# Patient Record
Sex: FEMALE | Race: WHITE | NOT HISPANIC OR LATINO
[De-identification: names, ages, dates, MRNs, and addresses within clinical notes are randomized per-mention and may not be internally consistent; named-entity substitution may affect disease eponyms.]

---

## 2017-01-27 ENCOUNTER — RECORD ABSTRACTING (OUTPATIENT)
Age: 67
End: 2017-01-27

## 2017-01-27 ENCOUNTER — OUTPATIENT (OUTPATIENT)
Dept: OUTPATIENT SERVICES | Facility: HOSPITAL | Age: 67
LOS: 1 days | Discharge: HOME | End: 2017-01-27
Payer: MEDICARE

## 2017-01-27 DIAGNOSIS — Z78.9 OTHER SPECIFIED HEALTH STATUS: ICD-10-CM

## 2017-01-27 DIAGNOSIS — R19.7 DIARRHEA, UNSPECIFIED: ICD-10-CM

## 2017-01-27 DIAGNOSIS — Z87.2 PERSONAL HISTORY OF DISEASES OF THE SKIN AND SUBCUTANEOUS TISSUE: ICD-10-CM

## 2017-01-27 PROCEDURE — 93010 ELECTROCARDIOGRAM REPORT: CPT

## 2017-01-31 ENCOUNTER — RESULT REVIEW (OUTPATIENT)
Age: 67
End: 2017-01-31

## 2017-02-01 ENCOUNTER — OUTPATIENT (OUTPATIENT)
Dept: OUTPATIENT SERVICES | Facility: HOSPITAL | Age: 67
LOS: 1 days | Discharge: HOME | End: 2017-02-01
Payer: MEDICARE

## 2017-02-01 VITALS
DIASTOLIC BLOOD PRESSURE: 82 MMHG | TEMPERATURE: 98 F | SYSTOLIC BLOOD PRESSURE: 145 MMHG | OXYGEN SATURATION: 97 % | HEART RATE: 106 BPM | RESPIRATION RATE: 20 BRPM | WEIGHT: 293 LBS | HEIGHT: 70 IN

## 2017-02-01 DIAGNOSIS — Z98.890 OTHER SPECIFIED POSTPROCEDURAL STATES: Chronic | ICD-10-CM

## 2017-02-01 DIAGNOSIS — K95.09 OTHER COMPLICATIONS OF GASTRIC BAND PROCEDURE: Chronic | ICD-10-CM

## 2017-02-01 PROCEDURE — 88305 TISSUE EXAM BY PATHOLOGIST: CPT | Mod: 26

## 2017-02-01 NOTE — ASU PATIENT PROFILE, ADULT - PMH
Atrial fibrillation    Congestive heart failure    Diabetes mellitus type II, controlled    Dyspnea    Morbid obesity with BMI of 40.0-44.9, adult    Neuropathy    Peripheral venous insufficiency    Thyroid nodule

## 2017-02-01 NOTE — ASU PATIENT PROFILE, ADULT - PSH
H/O colonoscopy    History of esophagogastroduodenoscopy (EGD)    Other complications of gastric band procedure    S/P left knee arthroscopy    Status post medial meniscus repair

## 2017-02-02 LAB — SURGICAL PATHOLOGY FINAL REPORT - CH: SIGNIFICANT CHANGE UP

## 2017-02-08 DIAGNOSIS — K57.30 DIVERTICULOSIS OF LARGE INTESTINE WITHOUT PERFORATION OR ABSCESS WITHOUT BLEEDING: ICD-10-CM

## 2017-02-08 DIAGNOSIS — Z79.82 LONG TERM (CURRENT) USE OF ASPIRIN: ICD-10-CM

## 2017-02-08 DIAGNOSIS — Z79.01 LONG TERM (CURRENT) USE OF ANTICOAGULANTS: ICD-10-CM

## 2017-02-08 DIAGNOSIS — G62.9 POLYNEUROPATHY, UNSPECIFIED: ICD-10-CM

## 2017-02-08 DIAGNOSIS — E66.01 MORBID (SEVERE) OBESITY DUE TO EXCESS CALORIES: ICD-10-CM

## 2017-02-08 DIAGNOSIS — I48.91 UNSPECIFIED ATRIAL FIBRILLATION: ICD-10-CM

## 2017-02-08 DIAGNOSIS — I73.9 PERIPHERAL VASCULAR DISEASE, UNSPECIFIED: ICD-10-CM

## 2017-02-08 DIAGNOSIS — K64.8 OTHER HEMORRHOIDS: ICD-10-CM

## 2017-02-08 DIAGNOSIS — E11.9 TYPE 2 DIABETES MELLITUS WITHOUT COMPLICATIONS: ICD-10-CM

## 2017-02-08 DIAGNOSIS — R19.7 DIARRHEA, UNSPECIFIED: ICD-10-CM

## 2017-02-08 DIAGNOSIS — I50.9 HEART FAILURE, UNSPECIFIED: ICD-10-CM

## 2017-02-08 DIAGNOSIS — Z88.0 ALLERGY STATUS TO PENICILLIN: ICD-10-CM

## 2017-02-08 DIAGNOSIS — E78.00 PURE HYPERCHOLESTEROLEMIA, UNSPECIFIED: ICD-10-CM

## 2017-02-08 DIAGNOSIS — Z87.891 PERSONAL HISTORY OF NICOTINE DEPENDENCE: ICD-10-CM

## 2017-02-14 ENCOUNTER — RECORD ABSTRACTING (OUTPATIENT)
Age: 67
End: 2017-02-14

## 2017-02-22 ENCOUNTER — APPOINTMENT (OUTPATIENT)
Dept: DERMATOLOGY | Facility: CLINIC | Age: 67
End: 2017-02-22

## 2017-03-21 ENCOUNTER — RX RENEWAL (OUTPATIENT)
Age: 67
End: 2017-03-21

## 2017-07-24 ENCOUNTER — RX RENEWAL (OUTPATIENT)
Age: 67
End: 2017-07-24

## 2017-10-23 ENCOUNTER — EMERGENCY (EMERGENCY)
Facility: HOSPITAL | Age: 67
LOS: 0 days | Discharge: HOME | End: 2017-10-23

## 2017-10-23 DIAGNOSIS — M54.6 PAIN IN THORACIC SPINE: ICD-10-CM

## 2017-10-23 DIAGNOSIS — Z79.899 OTHER LONG TERM (CURRENT) DRUG THERAPY: ICD-10-CM

## 2017-10-23 DIAGNOSIS — M54.2 CERVICALGIA: ICD-10-CM

## 2017-10-23 DIAGNOSIS — Z98.890 OTHER SPECIFIED POSTPROCEDURAL STATES: Chronic | ICD-10-CM
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
## 2017-10-23 DIAGNOSIS — K95.09 OTHER COMPLICATIONS OF GASTRIC BAND PROCEDURE: Chronic | ICD-10-CM

## 2017-10-23 DIAGNOSIS — Z79.01 LONG TERM (CURRENT) USE OF ANTICOAGULANTS: ICD-10-CM

## 2017-10-23 DIAGNOSIS — E11.9 TYPE 2 DIABETES MELLITUS WITHOUT COMPLICATIONS: ICD-10-CM

## 2017-10-23 DIAGNOSIS — Z88.0 ALLERGY STATUS TO PENICILLIN: ICD-10-CM

## 2017-10-23 DIAGNOSIS — I10 ESSENTIAL (PRIMARY) HYPERTENSION: ICD-10-CM
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
## 2017-10-30 ENCOUNTER — RX RENEWAL (OUTPATIENT)
Age: 67
End: 2017-10-30

## 2017-11-16 ENCOUNTER — APPOINTMENT (OUTPATIENT)
Dept: DERMATOLOGY | Facility: CLINIC | Age: 67
End: 2017-11-16
Payer: MEDICARE

## 2017-11-16 VITALS — HEIGHT: 70 IN | BODY MASS INDEX: 40.09 KG/M2 | WEIGHT: 280 LBS

## 2017-11-16 VITALS — BODY MASS INDEX: 40.09 KG/M2 | WEIGHT: 280 LBS | HEIGHT: 70 IN

## 2017-11-16 VITALS — HEIGHT: 70 IN

## 2017-11-16 DIAGNOSIS — Z86.39 PERSONAL HISTORY OF OTHER ENDOCRINE, NUTRITIONAL AND METABOLIC DISEASE: ICD-10-CM

## 2017-11-16 DIAGNOSIS — Z86.79 PERSONAL HISTORY OF OTHER DISEASES OF THE CIRCULATORY SYSTEM: ICD-10-CM

## 2017-11-16 DIAGNOSIS — L85.3 XEROSIS CUTIS: ICD-10-CM

## 2017-11-16 PROCEDURE — 99213 OFFICE O/P EST LOW 20 MIN: CPT

## 2017-11-16 RX ORDER — NITROFURANTOIN (MONOHYDRATE/MACROCRYSTALS) 25; 75 MG/1; MG/1
100 CAPSULE ORAL
Qty: 20 | Refills: 0 | Status: DISCONTINUED | COMMUNITY
Start: 2017-10-06

## 2017-11-16 RX ORDER — BLOOD SUGAR DIAGNOSTIC
STRIP MISCELLANEOUS
Qty: 200 | Refills: 0 | Status: DISCONTINUED | COMMUNITY
Start: 2017-10-27

## 2017-11-16 RX ORDER — METHYLPREDNISOLONE 4 MG/1
4 TABLET ORAL
Qty: 21 | Refills: 0 | Status: DISCONTINUED | COMMUNITY
Start: 2017-10-30

## 2017-11-16 RX ORDER — GLIMEPIRIDE 2 MG/1
2 TABLET ORAL
Refills: 0 | Status: DISCONTINUED | COMMUNITY
End: 2017-11-16

## 2017-11-16 RX ORDER — METOPROLOL TARTRATE 100 MG/1
100 TABLET, FILM COATED ORAL
Refills: 0 | Status: DISCONTINUED | COMMUNITY
End: 2017-11-16

## 2017-11-16 RX ORDER — CIPROFLOXACIN HYDROCHLORIDE 500 MG/1
500 TABLET, FILM COATED ORAL
Qty: 20 | Refills: 0 | Status: DISCONTINUED | COMMUNITY
Start: 2017-10-19

## 2017-11-16 RX ORDER — METOPROLOL SUCCINATE 100 MG/1
100 TABLET, EXTENDED RELEASE ORAL
Qty: 90 | Refills: 0 | Status: DISCONTINUED | COMMUNITY
Start: 2017-04-03

## 2017-11-16 RX ORDER — TIZANIDINE 2 MG/1
2 TABLET ORAL
Qty: 12 | Refills: 0 | Status: DISCONTINUED | COMMUNITY
Start: 2017-10-23

## 2017-11-16 RX ORDER — TRIAMCINOLONE ACETONIDE 1 MG/G
0.1 CREAM TOPICAL
Refills: 0 | Status: DISCONTINUED | COMMUNITY
End: 2017-11-16

## 2017-11-16 RX ORDER — ATORVASTATIN CALCIUM 10 MG/1
10 TABLET, FILM COATED ORAL
Refills: 0 | Status: DISCONTINUED | COMMUNITY
End: 2017-11-16

## 2017-11-16 RX ORDER — OXYCODONE AND ACETAMINOPHEN 5; 325 MG/1; MG/1
5-325 TABLET ORAL
Qty: 5 | Refills: 0 | Status: DISCONTINUED | COMMUNITY
Start: 2017-10-23

## 2017-11-16 RX ORDER — DIOSMIN COMPLEX NO.1 630 MG
TABLET ORAL
Refills: 0 | Status: DISCONTINUED | COMMUNITY
End: 2017-11-16

## 2017-11-16 RX ORDER — CHOLESTYRAMINE 4 G/9G
4 POWDER, FOR SUSPENSION ORAL
Qty: 30 | Refills: 0 | Status: DISCONTINUED | COMMUNITY
Start: 2017-08-17

## 2018-01-05 ENCOUNTER — INPATIENT (INPATIENT)
Facility: HOSPITAL | Age: 68
LOS: 3 days | Discharge: HOME | End: 2018-01-09
Attending: INTERNAL MEDICINE
Payer: COMMERCIAL

## 2018-01-05 VITALS — WEIGHT: 289.91 LBS | HEIGHT: 66 IN

## 2018-01-05 DIAGNOSIS — Z98.890 OTHER SPECIFIED POSTPROCEDURAL STATES: Chronic | ICD-10-CM

## 2018-01-05 DIAGNOSIS — J44.0 CHRONIC OBSTRUCTIVE PULMONARY DISEASE WITH (ACUTE) LOWER RESPIRATORY INFECTION: ICD-10-CM

## 2018-01-05 DIAGNOSIS — J18.9 PNEUMONIA, UNSPECIFIED ORGANISM: ICD-10-CM

## 2018-01-05 DIAGNOSIS — E66.01 MORBID (SEVERE) OBESITY DUE TO EXCESS CALORIES: ICD-10-CM

## 2018-01-05 DIAGNOSIS — K95.09 OTHER COMPLICATIONS OF GASTRIC BAND PROCEDURE: Chronic | ICD-10-CM

## 2018-01-05 LAB
ALBUMIN SERPL ELPH-MCNC: 3 G/DL — LOW (ref 3.3–5)
ALP SERPL-CCNC: 91 U/L — SIGNIFICANT CHANGE UP (ref 40–120)
ALT FLD-CCNC: 19 U/L — SIGNIFICANT CHANGE UP (ref 12–78)
ANION GAP SERPL CALC-SCNC: 7 MMOL/L — SIGNIFICANT CHANGE UP (ref 5–17)
ANISOCYTOSIS BLD QL: SIGNIFICANT CHANGE UP
APPEARANCE UR: CLEAR — SIGNIFICANT CHANGE UP
APTT BLD: 35.4 SEC — SIGNIFICANT CHANGE UP (ref 27.5–37.4)
AST SERPL-CCNC: 13 U/L — LOW (ref 15–37)
BASOPHILS # BLD AUTO: 0.2 K/UL — SIGNIFICANT CHANGE UP (ref 0–0.2)
BASOPHILS NFR BLD AUTO: 1.1 % — SIGNIFICANT CHANGE UP (ref 0–2)
BILIRUB SERPL-MCNC: 0.4 MG/DL — SIGNIFICANT CHANGE UP (ref 0.2–1.2)
BILIRUB UR-MCNC: NEGATIVE — SIGNIFICANT CHANGE UP
BUN SERPL-MCNC: 7 MG/DL — SIGNIFICANT CHANGE UP (ref 7–23)
CALCIUM SERPL-MCNC: 8.4 MG/DL — LOW (ref 8.5–10.1)
CHLORIDE SERPL-SCNC: 107 MMOL/L — SIGNIFICANT CHANGE UP (ref 96–108)
CO2 SERPL-SCNC: 26 MMOL/L — SIGNIFICANT CHANGE UP (ref 22–31)
COLOR SPEC: YELLOW — SIGNIFICANT CHANGE UP
CREAT SERPL-MCNC: 0.44 MG/DL — LOW (ref 0.5–1.3)
D DIMER BLD IA.RAPID-MCNC: <150 NG/ML DDU — SIGNIFICANT CHANGE UP
DIFF PNL FLD: NEGATIVE — SIGNIFICANT CHANGE UP
EOSINOPHIL # BLD AUTO: 0.2 K/UL — SIGNIFICANT CHANGE UP (ref 0–0.5)
EOSINOPHIL NFR BLD AUTO: 1.3 % — SIGNIFICANT CHANGE UP (ref 0–6)
GLUCOSE SERPL-MCNC: 91 MG/DL — SIGNIFICANT CHANGE UP (ref 70–99)
GLUCOSE UR QL: NEGATIVE MG/DL — SIGNIFICANT CHANGE UP
HCT VFR BLD CALC: 28.8 % — LOW (ref 34.5–45)
HGB BLD-MCNC: 8.4 G/DL — LOW (ref 11.5–15.5)
INR BLD: 1.94 RATIO — HIGH (ref 0.88–1.16)
KETONES UR-MCNC: NEGATIVE — SIGNIFICANT CHANGE UP
LEUKOCYTE ESTERASE UR-ACNC: NEGATIVE — SIGNIFICANT CHANGE UP
LYMPHOCYTES # BLD AUTO: 1.9 K/UL — SIGNIFICANT CHANGE UP (ref 1–3.3)
LYMPHOCYTES # BLD AUTO: 12.9 % — LOW (ref 13–44)
MCHC RBC-ENTMCNC: 20.7 PG — LOW (ref 27–34)
MCHC RBC-ENTMCNC: 29 GM/DL — LOW (ref 32–36)
MCV RBC AUTO: 71.3 FL — LOW (ref 80–100)
MICROCYTES BLD QL: SLIGHT — SIGNIFICANT CHANGE UP
MONOCYTES # BLD AUTO: 1.1 K/UL — HIGH (ref 0–0.9)
MONOCYTES NFR BLD AUTO: 7.4 % — SIGNIFICANT CHANGE UP (ref 2–14)
NEUTROPHILS # BLD AUTO: 11.4 K/UL — HIGH (ref 1.8–7.4)
NEUTROPHILS NFR BLD AUTO: 77.7 % — HIGH (ref 43–77)
NITRITE UR-MCNC: NEGATIVE — SIGNIFICANT CHANGE UP
NT-PROBNP SERPL-SCNC: 1130 PG/ML — HIGH (ref 0–125)
OVALOCYTES BLD QL SMEAR: SLIGHT — SIGNIFICANT CHANGE UP
PH UR: 7 — SIGNIFICANT CHANGE UP (ref 5–8)
PLAT MORPH BLD: (no result)
PLATELET # BLD AUTO: 458 K/UL — HIGH (ref 150–400)
POIKILOCYTOSIS BLD QL AUTO: SLIGHT — SIGNIFICANT CHANGE UP
POLYCHROMASIA BLD QL SMEAR: SLIGHT — SIGNIFICANT CHANGE UP
POTASSIUM SERPL-MCNC: 3.7 MMOL/L — SIGNIFICANT CHANGE UP (ref 3.5–5.3)
POTASSIUM SERPL-SCNC: 3.7 MMOL/L — SIGNIFICANT CHANGE UP (ref 3.5–5.3)
PROT SERPL-MCNC: 7.2 GM/DL — SIGNIFICANT CHANGE UP (ref 6–8.3)
PROT UR-MCNC: NEGATIVE MG/DL — SIGNIFICANT CHANGE UP
PROTHROM AB SERPL-ACNC: 21.2 SEC — HIGH (ref 9.8–12.7)
RAPID RVP RESULT: SIGNIFICANT CHANGE UP
RBC # BLD: 4.05 M/UL — SIGNIFICANT CHANGE UP (ref 3.8–5.2)
RBC # FLD: 16.6 % — HIGH (ref 10.3–14.5)
RBC BLD AUTO: (no result)
SODIUM SERPL-SCNC: 140 MMOL/L — SIGNIFICANT CHANGE UP (ref 135–145)
SP GR SPEC: 1 — LOW (ref 1.01–1.02)
TROPONIN I SERPL-MCNC: <0.015 NG/ML — SIGNIFICANT CHANGE UP (ref 0.01–0.04)
UROBILINOGEN FLD QL: NEGATIVE MG/DL — SIGNIFICANT CHANGE UP
WBC # BLD: 15 K/UL — HIGH (ref 3.8–10.5)
WBC # FLD AUTO: 15 K/UL — HIGH (ref 3.8–10.5)

## 2018-01-05 PROCEDURE — 99285 EMERGENCY DEPT VISIT HI MDM: CPT

## 2018-01-05 PROCEDURE — 71045 X-RAY EXAM CHEST 1 VIEW: CPT | Mod: 26

## 2018-01-05 PROCEDURE — 93010 ELECTROCARDIOGRAM REPORT: CPT

## 2018-01-05 RX ORDER — ASPIRIN/CALCIUM CARB/MAGNESIUM 324 MG
162 TABLET ORAL ONCE
Qty: 0 | Refills: 0 | Status: COMPLETED | OUTPATIENT
Start: 2018-01-05 | End: 2018-01-05

## 2018-01-05 RX ORDER — CEFTRIAXONE 500 MG/1
1000 INJECTION, POWDER, FOR SOLUTION INTRAMUSCULAR; INTRAVENOUS ONCE
Qty: 0 | Refills: 0 | Status: COMPLETED | OUTPATIENT
Start: 2018-01-05 | End: 2018-01-05

## 2018-01-05 RX ORDER — AZITHROMYCIN 500 MG/1
500 TABLET, FILM COATED ORAL ONCE
Qty: 0 | Refills: 0 | Status: COMPLETED | OUTPATIENT
Start: 2018-01-05 | End: 2018-01-05

## 2018-01-05 RX ORDER — CEFTRIAXONE 500 MG/1
1 INJECTION, POWDER, FOR SOLUTION INTRAMUSCULAR; INTRAVENOUS ONCE
Qty: 0 | Refills: 0 | Status: DISCONTINUED | OUTPATIENT
Start: 2018-01-05 | End: 2018-01-05

## 2018-01-05 RX ADMIN — Medication 162 MILLIGRAM(S): at 23:46

## 2018-01-05 RX ADMIN — AZITHROMYCIN 255 MILLIGRAM(S): 500 TABLET, FILM COATED ORAL at 23:12

## 2018-01-05 RX ADMIN — CEFTRIAXONE 1000 MILLIGRAM(S): 500 INJECTION, POWDER, FOR SOLUTION INTRAMUSCULAR; INTRAVENOUS at 22:30

## 2018-01-05 NOTE — ED PROVIDER NOTE - PMH
Atrial fibrillation    Congestive heart failure    Diabetes mellitus type II, controlled    Dyspnea    HTN (hypertension)    Morbid obesity with BMI of 40.0-44.9, adult    Neuropathy    Peripheral venous insufficiency    Thyroid nodule

## 2018-01-05 NOTE — ED PROVIDER NOTE - CONSTITUTIONAL, MLM
normal... Obese white female, no respiratory dscomfot, no sentence shortening nor distress, ill appearing though non toxic awake, alert, oriented to person, place, time/situation and in no apparent distress.

## 2018-01-05 NOTE — ED PROVIDER NOTE - DIAGNOSIS COUNSELING, MDM
conducted a detailed discussion... I had a detailed discussion with the patient and family regarding the historical points, exam findings, and any diagnostic results supporting the admit diagnosis.

## 2018-01-05 NOTE — ED PROVIDER NOTE - ENMT, MLM
Airway patent, Nasal mucosa clear. Mouth with moderately dry mucosa. Oropharynx clear. Throat has no vesicles, no oropharyngeal exudates and uvula is midline.

## 2018-01-05 NOTE — ED PROVIDER NOTE - MUSCULOSKELETAL, MLM
Spine appears normal, range of motion is not limited, no muscle or joint tenderness. 2+ pitting edema B/L pretibial. MAEx4. No tenderness.

## 2018-01-05 NOTE — ED PROVIDER NOTE - CARDIAC, MLM
Normal rate, irregularly irregular rhythm.  Heart sounds S1, S2.  No murmurs, rubs or gallops. Normal radial pulse.

## 2018-01-05 NOTE — ED PROVIDER NOTE - CARE PLAN
Principal Discharge DX:	CAP (community acquired pneumonia)  Secondary Diagnosis:	Acute on chronic congestive heart failure, unspecified congestive heart failure type

## 2018-01-05 NOTE — ED PROVIDER NOTE - OBJECTIVE STATEMENT
66 y/o female with PMHx of DM2, CHF, HTN, AFib (on Coumadin) presents to the ED c/o dyspnea x 2 weeks, gradually worsening. Sent in for evaluation by Dr. Mayer. Visited his office today. +mild productive cough, green yellow sputum. No fever. +chest discomfort only with coughing. +chronic LE edema, no change from baseline. No weight gain. +JETT after taking 5 steps. +orthopnea. Pt also c/o loose watery diarrhea, multiple episodes a day. +associated abd pain. No recent abx. States her PMD advised her that she is anemic, dehydrated. PMD Dr. Mayer

## 2018-01-05 NOTE — ED PROVIDER NOTE - MEDICAL DECISION MAKING DETAILS
68 y/o female hx of HTN, DM, chronic AFib on Coumadin 1 week of worsening SOB, JETT, orthopnea. Lungs are clear. Plan: CXR, EKG, monitor, labs including serial Laureen, BNP, ddimer, pulse ox, observe reassess.

## 2018-01-06 DIAGNOSIS — I48.91 UNSPECIFIED ATRIAL FIBRILLATION: ICD-10-CM

## 2018-01-06 DIAGNOSIS — E11.8 TYPE 2 DIABETES MELLITUS WITH UNSPECIFIED COMPLICATIONS: ICD-10-CM

## 2018-01-06 DIAGNOSIS — I50.9 HEART FAILURE, UNSPECIFIED: ICD-10-CM

## 2018-01-06 DIAGNOSIS — D50.9 IRON DEFICIENCY ANEMIA, UNSPECIFIED: ICD-10-CM

## 2018-01-06 DIAGNOSIS — I10 ESSENTIAL (PRIMARY) HYPERTENSION: ICD-10-CM

## 2018-01-06 DIAGNOSIS — I87.2 VENOUS INSUFFICIENCY (CHRONIC) (PERIPHERAL): ICD-10-CM

## 2018-01-06 DIAGNOSIS — J18.9 PNEUMONIA, UNSPECIFIED ORGANISM: ICD-10-CM

## 2018-01-06 DIAGNOSIS — K52.9 NONINFECTIVE GASTROENTERITIS AND COLITIS, UNSPECIFIED: ICD-10-CM

## 2018-01-06 LAB
ANION GAP SERPL CALC-SCNC: 9 MMOL/L — SIGNIFICANT CHANGE UP (ref 5–17)
BUN SERPL-MCNC: 7 MG/DL — SIGNIFICANT CHANGE UP (ref 7–23)
CALCIUM SERPL-MCNC: 8.5 MG/DL — SIGNIFICANT CHANGE UP (ref 8.5–10.1)
CHLORIDE SERPL-SCNC: 106 MMOL/L — SIGNIFICANT CHANGE UP (ref 96–108)
CO2 SERPL-SCNC: 26 MMOL/L — SIGNIFICANT CHANGE UP (ref 22–31)
CREAT SERPL-MCNC: 0.47 MG/DL — LOW (ref 0.5–1.3)
FERRITIN SERPL-MCNC: 15 NG/ML — SIGNIFICANT CHANGE UP (ref 15–150)
GLUCOSE BLDC GLUCOMTR-MCNC: 130 MG/DL — HIGH (ref 70–99)
GLUCOSE BLDC GLUCOMTR-MCNC: 142 MG/DL — HIGH (ref 70–99)
GLUCOSE BLDC GLUCOMTR-MCNC: 145 MG/DL — HIGH (ref 70–99)
GLUCOSE SERPL-MCNC: 132 MG/DL — HIGH (ref 70–99)
HCT VFR BLD CALC: 27.5 % — LOW (ref 34.5–45)
HGB BLD-MCNC: 8.1 G/DL — LOW (ref 11.5–15.5)
INR BLD: 1.87 RATIO — HIGH (ref 0.88–1.16)
IRON SATN MFR SERPL: 20 UG/DL — LOW (ref 30–160)
IRON SATN MFR SERPL: 6 % — LOW (ref 14–50)
MCHC RBC-ENTMCNC: 21.3 PG — LOW (ref 27–34)
MCHC RBC-ENTMCNC: 29.3 GM/DL — LOW (ref 32–36)
MCV RBC AUTO: 72.5 FL — LOW (ref 80–100)
PLATELET # BLD AUTO: 483 K/UL — HIGH (ref 150–400)
POTASSIUM SERPL-MCNC: 3.6 MMOL/L — SIGNIFICANT CHANGE UP (ref 3.5–5.3)
POTASSIUM SERPL-SCNC: 3.6 MMOL/L — SIGNIFICANT CHANGE UP (ref 3.5–5.3)
PROTHROM AB SERPL-ACNC: 20.5 SEC — HIGH (ref 9.8–12.7)
RBC # BLD: 3.79 M/UL — LOW (ref 3.8–5.2)
RBC # FLD: 16.7 % — HIGH (ref 10.3–14.5)
SODIUM SERPL-SCNC: 141 MMOL/L — SIGNIFICANT CHANGE UP (ref 135–145)
TIBC SERPL-MCNC: 326 UG/DL — SIGNIFICANT CHANGE UP (ref 220–430)
TROPONIN I SERPL-MCNC: <0.015 NG/ML — SIGNIFICANT CHANGE UP (ref 0.01–0.04)
TROPONIN I SERPL-MCNC: <0.015 NG/ML — SIGNIFICANT CHANGE UP (ref 0.01–0.04)
UIBC SERPL-MCNC: 306 UG/DL — SIGNIFICANT CHANGE UP (ref 110–370)
WBC # BLD: 11.8 K/UL — HIGH (ref 3.8–10.5)
WBC # FLD AUTO: 11.8 K/UL — HIGH (ref 3.8–10.5)

## 2018-01-06 PROCEDURE — 71250 CT THORAX DX C-: CPT | Mod: 26

## 2018-01-06 RX ORDER — DEXTROSE 50 % IN WATER 50 %
25 SYRINGE (ML) INTRAVENOUS ONCE
Qty: 0 | Refills: 0 | Status: DISCONTINUED | OUTPATIENT
Start: 2018-01-06 | End: 2018-01-09

## 2018-01-06 RX ORDER — LACTOBACILLUS ACIDOPHILUS 100MM CELL
1 CAPSULE ORAL DAILY
Qty: 0 | Refills: 0 | Status: DISCONTINUED | OUTPATIENT
Start: 2018-01-06 | End: 2018-01-09

## 2018-01-06 RX ORDER — POTASSIUM CHLORIDE 20 MEQ
40 PACKET (EA) ORAL ONCE
Qty: 0 | Refills: 0 | Status: COMPLETED | OUTPATIENT
Start: 2018-01-06 | End: 2018-01-06

## 2018-01-06 RX ORDER — ENOXAPARIN SODIUM 100 MG/ML
130 INJECTION SUBCUTANEOUS
Qty: 0 | Refills: 0 | Status: DISCONTINUED | OUTPATIENT
Start: 2018-01-06 | End: 2018-01-07

## 2018-01-06 RX ORDER — SODIUM CHLORIDE 9 MG/ML
1000 INJECTION, SOLUTION INTRAVENOUS
Qty: 0 | Refills: 0 | Status: DISCONTINUED | OUTPATIENT
Start: 2018-01-06 | End: 2018-01-09

## 2018-01-06 RX ORDER — GLUCAGON INJECTION, SOLUTION 0.5 MG/.1ML
1 INJECTION, SOLUTION SUBCUTANEOUS ONCE
Qty: 0 | Refills: 0 | Status: DISCONTINUED | OUTPATIENT
Start: 2018-01-06 | End: 2018-01-09

## 2018-01-06 RX ORDER — FUROSEMIDE 40 MG
40 TABLET ORAL EVERY 8 HOURS
Qty: 0 | Refills: 0 | Status: DISCONTINUED | OUTPATIENT
Start: 2018-01-06 | End: 2018-01-06

## 2018-01-06 RX ORDER — DEXTROSE 50 % IN WATER 50 %
1 SYRINGE (ML) INTRAVENOUS ONCE
Qty: 0 | Refills: 0 | Status: DISCONTINUED | OUTPATIENT
Start: 2018-01-06 | End: 2018-01-09

## 2018-01-06 RX ORDER — POTASSIUM CHLORIDE 20 MEQ
20 PACKET (EA) ORAL
Qty: 0 | Refills: 0 | Status: DISCONTINUED | OUTPATIENT
Start: 2018-01-06 | End: 2018-01-09

## 2018-01-06 RX ORDER — INSULIN LISPRO 100/ML
VIAL (ML) SUBCUTANEOUS
Qty: 0 | Refills: 0 | Status: DISCONTINUED | OUTPATIENT
Start: 2018-01-06 | End: 2018-01-09

## 2018-01-06 RX ORDER — METOPROLOL TARTRATE 50 MG
100 TABLET ORAL DAILY
Qty: 0 | Refills: 0 | Status: DISCONTINUED | OUTPATIENT
Start: 2018-01-06 | End: 2018-01-09

## 2018-01-06 RX ORDER — INSULIN LISPRO 100/ML
VIAL (ML) SUBCUTANEOUS AT BEDTIME
Qty: 0 | Refills: 0 | Status: DISCONTINUED | OUTPATIENT
Start: 2018-01-06 | End: 2018-01-09

## 2018-01-06 RX ORDER — DEXTROSE 50 % IN WATER 50 %
12.5 SYRINGE (ML) INTRAVENOUS ONCE
Qty: 0 | Refills: 0 | Status: DISCONTINUED | OUTPATIENT
Start: 2018-01-06 | End: 2018-01-09

## 2018-01-06 RX ORDER — FUROSEMIDE 40 MG
40 TABLET ORAL EVERY 12 HOURS
Qty: 0 | Refills: 0 | Status: DISCONTINUED | OUTPATIENT
Start: 2018-01-06 | End: 2018-01-09

## 2018-01-06 RX ORDER — ASPIRIN/CALCIUM CARB/MAGNESIUM 324 MG
81 TABLET ORAL DAILY
Qty: 0 | Refills: 0 | Status: DISCONTINUED | OUTPATIENT
Start: 2018-01-06 | End: 2018-01-09

## 2018-01-06 RX ADMIN — Medication 100 MILLIGRAM(S): at 13:09

## 2018-01-06 RX ADMIN — Medication 1 TABLET(S): at 13:09

## 2018-01-06 RX ADMIN — Medication 40 MILLIGRAM(S): at 18:14

## 2018-01-06 RX ADMIN — Medication 40 MILLIEQUIVALENT(S): at 13:08

## 2018-01-06 RX ADMIN — Medication 20 MILLIEQUIVALENT(S): at 18:14

## 2018-01-06 RX ADMIN — Medication 81 MILLIGRAM(S): at 13:30

## 2018-01-06 RX ADMIN — ENOXAPARIN SODIUM 130 MILLIGRAM(S): 100 INJECTION SUBCUTANEOUS at 18:15

## 2018-01-06 NOTE — H&P ADULT - PROBLEM SELECTOR PLAN 5
Hold Metformin. Will do stool for CDifficile as with leukocytosis and worsening diarrhea. O & P and stool culture. GI to see. Replace Potassium and check Magnesium with PVCs.

## 2018-01-06 NOTE — H&P ADULT - ATTENDING COMMENTS
Diet:    RADIOLOGY & ADDITIONAL TESTS:    Imaging Personally Reviewed:  [ ] YES  [ ] NO    Consultant(s) Notes Reviewed:  [ ] YES  [ ] NO      DVT Prophylaxis:  Subcu Heparin [  ]     LMWH [ ]     Coumadin [ ]    Xaeralto [ ]    Eliquis [ ]   Venodyne pumps [ ]    Discussed with Patient [ ]     Family [ ]          [ ]   RN[ ]      [ ]    Advance Directives:    Care Discussed with Consultants/Other Providers [ ] YES  [ ] NO Diet: 1800 ADA low salt and cholesterol    RADIOLOGY & ADDITIONAL TESTS:    Imaging Personally Reviewed:  [ x] YES  [ ] NO    Consultant(s) Notes Reviewed:  [ ] YES  [ ] NO      DVT Prophylaxis:  Subcu Heparin [  ]     LMWH [ x]     Coumadin [ ]    Xaeralto [ ]    Eliquis [ ]   Venodyne pumps [ ]    Discussed with Patient [x ]     Family [ ]          [ ]   RN[x ]      [ ]    Advance Directives: Full code.     Care Discussed with Consultants/Other Providers [x ] YES  [ ] NO  cardiology, RN and ED physician and pharmacist.

## 2018-01-06 NOTE — ED ADULT NURSE REASSESSMENT NOTE - NS ED NURSE REASSESS COMMENT FT1
Patient received from Marian Cantrell RN. Patient resting comfortably in bed. Safety and comfort maintained. Will continue to [facility].

## 2018-01-06 NOTE — H&P ADULT - PROBLEM SELECTOR PLAN 3
Order Toprol and Cardizem and monitor. Lovenox instead of Coumadin due to GI issues and anemia. Patient and cardiology agreed.

## 2018-01-06 NOTE — ED ADULT NURSE REASSESSMENT NOTE - GENERAL PATIENT STATE
comfortable appearance
comfortable appearance
comfortable appearance/cooperative
comfortable appearance

## 2018-01-06 NOTE — H&P ADULT - PROBLEM SELECTOR PLAN 1
Got one dose of IV Zithromax/Rocephin. Will order Levaquin to extra fluids. Follow sputum and blood cultures. Urine for Legionella. CT chest to assess the severity.

## 2018-01-06 NOTE — H&P ADULT - HISTORY OF PRESENT ILLNESS
68 y/o female with PMHx of DM2, CHF, HTN, AFib (on Coumadin) presents to the ED c/o dyspnea x 2 weeks, gradually worsening. Sent in for evaluation by Dr. Mayer. Visited his office today. +mild productive cough, green yellow sputum. No fever. +chest discomfort only with coughing. +chronic LE edema, no change from baseline. No weight gain. +JETT after taking 5 steps. +orthopnea. Pt also c/o loose watery diarrhea, multiple episodes a day. +associated abd pain. No recent abx. States her PMD advised her that she is anemic, dehydrated. 68 y/o female with PMHx of DM2, Chronic CHF possibly diastolic,HTN, Chronic AFib (on Coumadin), Morbid Obesity, Chronic diarrhea started after cholecystectomy s/p colonoscopy 02/17 diverticulosis and hemorrhoids with negative rectal mucosa biopsy, Chronic Microcytic anemia, PVD of legs and chronic leg edema with stasis dermatitis presents to the ED c/o dyspnea x 2 weeks, gradually worsening. Sent in for evaluation by Dr. Mayer. Visited his office today. +mild productive cough, green yellow sputum. No fever. + chest discomfort only with coughing. +chronic LE edema, no change from baseline. No weight gain. +JETT after taking 5 steps. +orthopnea. Pt also c/o loose watery diarrhea, multiple episodes a day. +associated abd pain. No recent abx. States her PMD advised her that she is anemic, dehydrated.  Patient admitted that not taking Lasix for last couple of days as was told that she is dehydrated. 3 pillow orthopnea with paroxysmal nocturnal dyspnea.

## 2018-01-06 NOTE — H&P ADULT - NSHPLABSRESULTS_GEN_ALL_CORE
Chest x-ray patchy right lower lobe infiltrate    EKG Afib @ 103 with PVCs and base line artifact and nonspecific ST changes.

## 2018-01-06 NOTE — CONSULT NOTE ADULT - ASSESSMENT
67 F with mild fluid overload state and possible PNA     recommend gentle diuresis, renal function ok, BID lasix for now recommend CT chest for better defination of pulm disease     abx for possible PNA      monitor BNP, renal function, keep k+ > 4 and mg > 2    anemia- GI evalaution pending    AF- on lovemox for now pending possible GI procedure, if no procedure, will resume coumadin

## 2018-01-06 NOTE — CONSULT NOTE ADULT - SUBJECTIVE AND OBJECTIVE BOX
CHIEF COMPLAINT: Patient is a 67y old  Female who presents with a chief complaint of Increasing shortness of breath x 2 weeks with cough and anemic (06 Jan 2018 08:41)      HPI:  68 y/o female with PMHx of DM2, CHF, HTN, AFib (on Coumadin) presents to the ED c/o dyspnea x 2 weeks, gradually worsening. Sent in for evaluation by Dr. Mayer. Visited his office today. +mild productive cough, green yellow sputum. No fever. +chest discomfort only with coughing. +chronic LE edema, no change from baseline. No weight gain. +JETT after taking 5 steps. +orthopnea. Pt also c/o loose watery diarrhea, multiple episodes a day. +associated abd pain. No recent abx.      no acute issues overnight- still short of breath    PMHx: PAST MEDICAL & SURGICAL HISTORY:  HTN (hypertension)  Thyroid nodule  Peripheral venous insufficiency  Neuropathy  Morbid obesity with BMI of 40.0-44.9, adult  Dyspnea  Congestive heart failure  Atrial fibrillation  Diabetes mellitus type II, controlled  Status post medial meniscus repair  S/P left knee arthroscopy  Other complications of gastric band procedure  H/O colonoscopy  History of esophagogastroduodenoscopy (EGD)        Soc Hx: no current toxic habits      Allergies: Allergies    penicillin (Hives)    Intolerances          REVIEW OF SYSTEMS:    CONSTITUTIONAL: No weakness, fevers or chills  EYES/ENT: No visual changes;  No vertigo or throat pain   NECK: No pain or stiffness  RESPIRATORY: No cough, wheezing, hemoptysis; No shortness of breath  CARDIOVASCULAR: No chest pain or palpitations  GASTROINTESTINAL: No abdominal or epigastric pain. No nausea, vomiting, or hematemesis; No diarrhea or constipation. No melena or hematochezia.  GENITOURINARY: No dysuria, frequency or hematuria  NEUROLOGICAL: No numbness or weakness  SKIN: No itching, burning, rashes, or lesions   All other review of systems is negative unless indicated above    ICU Vital Signs Last 24 Hrs  T(C): 37 (06 Jan 2018 04:35), Max: 37 (06 Jan 2018 04:35)  T(F): 98.6 (06 Jan 2018 04:35), Max: 98.6 (06 Jan 2018 04:35)  HR: 99 (06 Jan 2018 05:45) (86 - 110)  BP: 119/68 (06 Jan 2018 05:45) (119/68 - 161/90)  BP(mean): --  ABP: --  ABP(mean): --  RR: 20 (06 Jan 2018 04:35) (18 - 20)  SpO2: 98% (06 Jan 2018 04:35) (98% - 98%)        PHYSICAL EXAM:   Constitutional: NAD, awake and alert, well-developed  HEENT: PERR, EOMI, Normal Hearing, MMM  Neck: Soft and supple, No LAD, No JVD  Respiratory: Breath sounds are clear bilaterally, No wheezing, rales or rhonchi  Cardiovascular: S1 and S2, regular rate and rhythm, no Murmurs, gallops or rubs  Gastrointestinal: Bowel Sounds present, soft, nontender, nondistended, no guarding, no rebound  Extremities: No peripheral edema  Vascular: 2+ peripheral pulses  Neurological: A/O x 3, no focal deficits  Musculoskeletal: 5/5 strength b/l upper and lower extremities  Skin: No rashes    MEDICATIONS:  MEDICATIONS  (STANDING):  aspirin  chewable 81 milliGRAM(s) Oral daily  dextrose 5%. 1000 milliLiter(s) (50 mL/Hr) IV Continuous <Continuous>  dextrose 50% Injectable 12.5 Gram(s) IV Push once  dextrose 50% Injectable 25 Gram(s) IV Push once  dextrose 50% Injectable 25 Gram(s) IV Push once  diltiazem    Tablet 120 milliGRAM(s) Oral two times a day  furosemide   Injectable 40 milliGRAM(s) IV Push every 8 hours  insulin lispro (HumaLOG) corrective regimen sliding scale   SubCutaneous three times a day before meals  insulin lispro (HumaLOG) corrective regimen sliding scale   SubCutaneous at bedtime  levoFLOXacin IVPB      levoFLOXacin IVPB 750 milliGRAM(s) IV Intermittent once  metoprolol succinate  milliGRAM(s) Oral daily  potassium chloride    Tablet ER 20 milliEquivalent(s) Oral two times a day  potassium chloride    Tablet ER 40 milliEquivalent(s) Oral once      LABS: All Labs Reviewed:                        8.4    15.0  )-----------( 458      ( 05 Jan 2018 19:44 )             28.8     01-05    140  |  107  |  7   ----------------------------<  91  3.7   |  26  |  0.44<L>    Ca    8.4<L>      05 Jan 2018 19:44    TPro  7.2  /  Alb  3.0<L>  /  TBili  0.4  /  DBili  x   /  AST  13<L>  /  ALT  19  /  AlkPhos  91  01-05    PT/INR - ( 05 Jan 2018 19:44 )   PT: 21.2 sec;   INR: 1.94 ratio         PTT - ( 05 Jan 2018 19:44 )  PTT:35.4 sec  CARDIAC MARKERS ( 06 Jan 2018 06:33 )  <0.015 ng/mL / x     / x     / x     / x      CARDIAC MARKERS ( 06 Jan 2018 00:55 )  <0.015 ng/mL / x     / x     / x     / x      CARDIAC MARKERS ( 05 Jan 2018 19:44 )  <0.015 ng/mL / x     / x     / x     / x          Serum Pro-Brain Natriuretic Peptide: 1130 pg/mL (01-05 @ 19:44)        EKG: pending    Telemetry:  AF HR mildly elevated

## 2018-01-06 NOTE — H&P ADULT - NSHPPHYSICALEXAM_GEN_ALL_CORE
T(C): 37 (01-06-18 @ 04:35), Max: 37 (01-06-18 @ 04:35)  HR: 99 (01-06-18 @ 05:45) (86 - 110)  BP: 119/68 (01-06-18 @ 05:45) (119/68 - 161/90)  RR: 20 (01-06-18 @ 04:35) (18 - 20)  SpO2: 98% (01-06-18 @ 04:35) (98% - 98%)  Wt(kg): --  I&O's Summary        PHYSICAL EXAM:  GENERAL: NAD, well-groomed, well-developed  HEAD:  Atraumatic, Normocephalic  EYES: EOMI, PERRLA, conjunctiva and sclera clear  ENMT: No tonsillar erythema, exudates, or enlargement; Moist mucous membranes, Good dentition, No lesions  NECK: Supple, No JVD, Normal thyroid  NERVOUS SYSTEM:  Alert & Oriented X3, Good concentration; Motor Strength 5/5 B/L upper and lower extremities;   CHEST/LUNG: Clear to percussion bilaterally; No rales, rhonchi, wheezing, or rubs  HEART: Regular rate and rhythm; No murmurs, rubs, or gallops  ABDOMEN: Soft, Nontender, Nondistended; Bowel sounds present  EXTREMITIES:  2+ Peripheral Pulses, No clubbing, cyanosis, or edema  LYMPH: No lymphadenopathy noted  SKIN: No rashes or lesions T(C): 37 (01-06-18 @ 04:35), Max: 37 (01-06-18 @ 04:35)  HR: 99 (01-06-18 @ 05:45) (86 - 110)  BP: 119/68 (01-06-18 @ 05:45) (119/68 - 161/90)  RR: 20 (01-06-18 @ 04:35) (18 - 20)  SpO2: 98% (01-06-18 @ 04:35) (98% - 98%)  Wt(kg): --  I&O's Summary        PHYSICAL EXAM:  GENERAL: Mild to moderate respiratory distress but well-developed  HEAD:  Atraumatic, Normocephalic  EYES: EOMI, PERRLA, conjunctiva and sclera clear  ENMT: No tonsillar erythema, exudates, or enlargement; Dry mucous membranes.  No lesions  NECK: Supple, No JVD, Normal thyroid  NERVOUS SYSTEM:  Alert & Oriented X3, Good concentration; Motor Strength 5/5 B/L upper and lower extremities;   CHEST/LUNG: Decreased at bases with crackles at bases right worse than left.   HEART: Irregularly irregular II/VI systolic murmurs at LSB.   ABDOMEN: Soft, Nontender, Nondistended; Bowel sounds present  EXTREMITIES:  1+ Peripheral Pulses, No clubbing, cyanosis,  (+2 ) pedal edema  LYMPH: No lymphadenopathy noted  SKIN: Chronic venous stasis dermatitis worse on left shin.  RECTAL: Done with patient consent and in presence of nurse. Only smear of brown stool and guaiac negative.

## 2018-01-06 NOTE — ED ADULT NURSE REASSESSMENT NOTE - NS ED NURSE REASSESS COMMENT FT1
Received pt from VLADIMIR Sawyer. Pt AxOx4, VS stable, dyspnea on exertion persists, cardiac monitoring maintained. Comfort and safety measures maintained. Pt sitting upright in recliner due to extreme discomfort in stretcher. Will continue to monitor. Received pt from VLADIMIR Power. Pt AxOx4, VS stable, dyspnea on exertion persists, cardiac monitoring maintained. Comfort and safety measures maintained. Pt sitting upright in recliner due to extreme discomfort in stretcher. Plan of care explained, Will continue to monitor.

## 2018-01-06 NOTE — H&P ADULT - ASSESSMENT
68 y/o female with PMHx of DM2, Chronic CHF possibly diastolic,HTN, Chronic AFib (on Coumadin), Morbid Obesity, Chronic diarrhea started after cholecystectomy s/p colonoscopy 02/17 diverticulosis and hemorrhoids with negative rectal mucosa biopsy, Chronic Microcytic anemia, PVD of legs and chronic leg edema with stasis dermatitis presents to the ED c/o dyspnea x 2 weeks, gradually worsening. Sent in for evaluation by Dr. Mayer. Visited his office today. +mild productive cough, green yellow sputum. No fever. + chest discomfort only with coughing. +chronic LE edema, no change from baseline. No weight gain. +JETT after taking 5 steps. +orthopnea. Pt also c/o loose watery diarrhea, multiple episodes a day. +associated abd pain. No recent abx. States her PMD advised her that she is anemic, dehydrated. Being admitted for right sided CAP, acute on chronic diastolic CHF, rapid atrial fibrillation, worsening of microcytic anemia and diarrheal syndrome.

## 2018-01-07 LAB
ANION GAP SERPL CALC-SCNC: 8 MMOL/L — SIGNIFICANT CHANGE UP (ref 5–17)
BUN SERPL-MCNC: 9 MG/DL — SIGNIFICANT CHANGE UP (ref 7–23)
CALCIUM SERPL-MCNC: 8.8 MG/DL — SIGNIFICANT CHANGE UP (ref 8.5–10.1)
CHLORIDE SERPL-SCNC: 104 MMOL/L — SIGNIFICANT CHANGE UP (ref 96–108)
CO2 SERPL-SCNC: 28 MMOL/L — SIGNIFICANT CHANGE UP (ref 22–31)
CREAT SERPL-MCNC: 0.51 MG/DL — SIGNIFICANT CHANGE UP (ref 0.5–1.3)
GLUCOSE BLDC GLUCOMTR-MCNC: 188 MG/DL — HIGH (ref 70–99)
GLUCOSE BLDC GLUCOMTR-MCNC: 250 MG/DL — HIGH (ref 70–99)
GLUCOSE BLDC GLUCOMTR-MCNC: 283 MG/DL — HIGH (ref 70–99)
GLUCOSE SERPL-MCNC: 121 MG/DL — HIGH (ref 70–99)
HBA1C BLD-MCNC: 6.1 % — HIGH (ref 4–5.6)
HCT VFR BLD CALC: 31 % — LOW (ref 34.5–45)
HGB BLD-MCNC: 8.9 G/DL — LOW (ref 11.5–15.5)
INR BLD: 1.69 RATIO — HIGH (ref 0.88–1.16)
MCHC RBC-ENTMCNC: 20.6 PG — LOW (ref 27–34)
MCHC RBC-ENTMCNC: 28.7 GM/DL — LOW (ref 32–36)
MCV RBC AUTO: 71.7 FL — LOW (ref 80–100)
NT-PROBNP SERPL-SCNC: 903 PG/ML — HIGH (ref 0–125)
PLATELET # BLD AUTO: 463 K/UL — HIGH (ref 150–400)
POTASSIUM SERPL-MCNC: 3.8 MMOL/L — SIGNIFICANT CHANGE UP (ref 3.5–5.3)
POTASSIUM SERPL-SCNC: 3.8 MMOL/L — SIGNIFICANT CHANGE UP (ref 3.5–5.3)
PROTHROM AB SERPL-ACNC: 18.4 SEC — HIGH (ref 9.8–12.7)
RBC # BLD: 4.32 M/UL — SIGNIFICANT CHANGE UP (ref 3.8–5.2)
RBC # FLD: 16.8 % — HIGH (ref 10.3–14.5)
SODIUM SERPL-SCNC: 140 MMOL/L — SIGNIFICANT CHANGE UP (ref 135–145)
WBC # BLD: 10.9 K/UL — HIGH (ref 3.8–10.5)
WBC # FLD AUTO: 10.9 K/UL — HIGH (ref 3.8–10.5)

## 2018-01-07 RX ORDER — AZITHROMYCIN 500 MG/1
500 TABLET, FILM COATED ORAL ONCE
Qty: 0 | Refills: 0 | Status: COMPLETED | OUTPATIENT
Start: 2018-01-07 | End: 2018-01-07

## 2018-01-07 RX ORDER — CEFTRIAXONE 500 MG/1
1 INJECTION, POWDER, FOR SOLUTION INTRAMUSCULAR; INTRAVENOUS ONCE
Qty: 0 | Refills: 0 | Status: DISCONTINUED | OUTPATIENT
Start: 2018-01-07 | End: 2018-01-07

## 2018-01-07 RX ORDER — ACETAMINOPHEN 500 MG
650 TABLET ORAL ONCE
Qty: 0 | Refills: 0 | Status: COMPLETED | OUTPATIENT
Start: 2018-01-07 | End: 2018-01-07

## 2018-01-07 RX ORDER — CEFTRIAXONE 500 MG/1
INJECTION, POWDER, FOR SOLUTION INTRAMUSCULAR; INTRAVENOUS
Qty: 0 | Refills: 0 | Status: DISCONTINUED | OUTPATIENT
Start: 2018-01-07 | End: 2018-01-07

## 2018-01-07 RX ORDER — CEFTRIAXONE 500 MG/1
1000 INJECTION, POWDER, FOR SOLUTION INTRAMUSCULAR; INTRAVENOUS DAILY
Qty: 0 | Refills: 0 | Status: DISCONTINUED | OUTPATIENT
Start: 2018-01-07 | End: 2018-01-07

## 2018-01-07 RX ORDER — AZITHROMYCIN 500 MG/1
TABLET, FILM COATED ORAL
Qty: 0 | Refills: 0 | Status: DISCONTINUED | OUTPATIENT
Start: 2018-01-07 | End: 2018-01-07

## 2018-01-07 RX ORDER — WARFARIN SODIUM 2.5 MG/1
7.5 TABLET ORAL DAILY
Qty: 0 | Refills: 0 | Status: DISCONTINUED | OUTPATIENT
Start: 2018-01-07 | End: 2018-01-09

## 2018-01-07 RX ADMIN — WARFARIN SODIUM 7.5 MILLIGRAM(S): 2.5 TABLET ORAL at 21:52

## 2018-01-07 RX ADMIN — AZITHROMYCIN 255 MILLIGRAM(S): 500 TABLET, FILM COATED ORAL at 11:38

## 2018-01-07 RX ADMIN — Medication 100 MILLIGRAM(S): at 05:50

## 2018-01-07 RX ADMIN — Medication 40 MILLIGRAM(S): at 05:50

## 2018-01-07 RX ADMIN — Medication 650 MILLIGRAM(S): at 03:08

## 2018-01-07 RX ADMIN — Medication 20 MILLIEQUIVALENT(S): at 05:50

## 2018-01-07 RX ADMIN — ENOXAPARIN SODIUM 130 MILLIGRAM(S): 100 INJECTION SUBCUTANEOUS at 05:48

## 2018-01-07 RX ADMIN — Medication: at 19:15

## 2018-01-07 RX ADMIN — Medication 81 MILLIGRAM(S): at 16:17

## 2018-01-07 RX ADMIN — Medication 20 MILLIEQUIVALENT(S): at 16:19

## 2018-01-07 RX ADMIN — Medication 650 MILLIGRAM(S): at 09:28

## 2018-01-07 NOTE — PROGRESS NOTE ADULT - SUBJECTIVE AND OBJECTIVE BOX
History of Present Illness: 	  66 y/o female with PMHx of DM2, Chronic CHF possibly diastolic,HTN, Chronic AFib (on Coumadin), Morbid Obesity, Chronic diarrhea started after cholecystectomy s/p colonoscopy 02/17 diverticulosis and hemorrhoids with negative rectal mucosa biopsy, Chronic Microcytic anemia, PVD of legs and chronic leg edema with stasis dermatitis presents to the ED c/o dyspnea x 2 weeks, gradually worsening. Sent in for evaluation by Dr. Mayer. Visited his office today. +mild productive cough, green yellow sputum. No fever. + chest discomfort only with coughing. +chronic LE edema, no change from baseline. No weight gain. +JETT after taking 5 steps. +orthopnea. Pt also c/o loose watery diarrhea, multiple episodes a day. +associated abd pain. No recent abx. States her PMD advised her that she is anemic, dehydrated.  Patient admitted that not taking Lasix for last couple of days as was told that she is dehydrated. 3 pillow orthopnea with paroxysmal nocturnal dyspnea.       · Assessment		  66 y/o female with PMHx of DM2, Chronic CHF possibly diastolic,HTN, Chronic AFib (on Coumadin), Morbid Obesity, Chronic diarrhea started after cholecystectomy s/p colonoscopy 02/17 diverticulosis and hemorrhoids with negative rectal mucosa biopsy, Chronic Microcytic anemia, PVD of legs and chronic leg edema with stasis dermatitis presents to the ED c/o dyspnea x 2 weeks, gradually worsening. Sent in for evaluation by Dr. Mayer. Visited his office today. +mild productive cough, green yellow sputum. No fever. + chest discomfort only with coughing. +chronic LE edema, no change from baseline. No weight gain. +JETT after taking 5 steps. +orthopnea. Pt also c/o loose watery diarrhea, multiple episodes a day. +associated abd pain. No recent abx. States her PMD advised her that she is anemic, dehydrated. Being admitted for right sided CAP, acute on chronic diastolic CHF, rapid atrial fibrillation, worsening of microcytic anemia and diarrheal syndrome.       Problem/Plan - 1:  ·  Problem: CAP (community acquired pneumonia).  Plan: Got one dose of IV Zithromax/Rocephin. Will order Levaquin to extra fluids. Follow sputum and blood cultures. Urine for Legionella. CT chest to assess the severity.      Problem/Plan - 2:  ·  Problem: Acute on chronic congestive heart failure, unspecified congestive heart failure type.  Plan: IV Lasix BID. Discuss with cardiology. Daily weight. Monitor on tele. Diet.      Problem/Plan - 3:  ·  Problem: Rapid atrial fibrillation.  Plan: Order Toprol and Cardizem and monitor. Lovenox instead of Coumadin due to GI issues and anemia. Patient and cardiology agreed.      Problem/Plan - 4:  ·  Problem: Microcytic anemia.  Plan: Guaiac negative. Hgb 9.6 09/16. Monitor. Hold Coumadin. Iron studies.      Problem/Plan - 5:  ·  Problem: Diarrheal disease.  Plan: Hold Metformin. Will do stool for CDifficile as with leukocytosis and worsening diarrhea. O & P and stool culture. GI to see. Replace Potassium and check Magnesium with PVCs.      Problem/Plan - 6:  Problem: Peripheral venous insufficiency. Plan: Lasix. Leg elevation. Leg stocking. Weight management.     Problem/Plan - 7:  ·  Problem: Controlled type 2 diabetes mellitus with complication, without long-term current use of insulin.  Plan: Hold Metformin. Fingersticks with sliding scale insulin. HgbA1c. Diet.      Problem/Plan - 8:  ·  Problem: Hypertension, unspecified type.  Plan: Medications and diet. Monitor. CHIEF COMPLAINT/Diagnosis: acute ex CHF/ lower ext edema/ afib    SUBJECTIVE: no complaints    REVIEW OF SYSTEMS:    CONSTITUTIONAL: No weakness, fevers or chills  EYES/ENT: No visual changes;  No vertigo or throat pain   NECK: No pain or stiffness  RESPIRATORY: No cough, wheezing, hemoptysis; No shortness of breath  CARDIOVASCULAR: No chest pain or palpitations  GASTROINTESTINAL: No abdominal or epigastric pain. No nausea, vomiting, or hematemesis; No diarrhea or constipation. No melena or hematochezia.  GENITOURINARY: No dysuria, frequency or hematuria  NEUROLOGICAL: No numbness or weakness  SKIN: No itching, burning, rashes, or lesions   All other review of systems is negative unless indicated above    Vital Signs Last 24 Hrs  T(C): 36.6 (07 Jan 2018 10:08), Max: 36.9 (07 Jan 2018 06:03)  T(F): 97.9 (07 Jan 2018 10:08), Max: 98.5 (07 Jan 2018 06:03)  HR: 82 (07 Jan 2018 10:08) (76 - 87)  BP: 113/66 (07 Jan 2018 10:08) (113/66 - 127/73)  BP(mean): --  RR: 17 (07 Jan 2018 10:08) (16 - 18)  SpO2: 100% (07 Jan 2018 10:08) (100% - 100%)    I&O's Summary      CAPILLARY BLOOD GLUCOSE      POCT Blood Glucose.: 188 mg/dL (07 Jan 2018 11:22)  POCT Blood Glucose.: 145 mg/dL (06 Jan 2018 21:13)  POCT Blood Glucose.: 130 mg/dL (06 Jan 2018 17:30)      PHYSICAL EXAM:    Constitutional: NAD, awake and alert, well-developed  HEENT: PERR, EOMI, Normal Hearing, MMM  Neck: Soft and supple, No LAD, No JVD  Respiratory: Breath sounds are clear bilaterally, No wheezing, rales or rhonchi  Cardiovascular: S1 and S2, regular rate and rhythm, no Murmurs, gallops or rubs  Gastrointestinal: Bowel Sounds present, soft, nontender, nondistended, no guarding, no rebound  Extremities: No peripheral edema  Vascular: 2+ peripheral pulses  Neurological: A/O x 3, no focal deficits  Musculoskeletal: 5/5 strength b/l upper and lower extremities  Skin: No rashes    MEDICATIONS:  MEDICATIONS  (STANDING):  aspirin  chewable 81 milliGRAM(s) Oral daily  azithromycin  IVPB      cefTRIAXone Injectable 1000 milliGRAM(s) IV Push daily  dextrose 5%. 1000 milliLiter(s) (50 mL/Hr) IV Continuous <Continuous>  dextrose 50% Injectable 12.5 Gram(s) IV Push once  dextrose 50% Injectable 25 Gram(s) IV Push once  dextrose 50% Injectable 25 Gram(s) IV Push once  diltiazem    Tablet 120 milliGRAM(s) Oral two times a day  enoxaparin Injectable 130 milliGRAM(s) SubCutaneous two times a day  furosemide   Injectable 40 milliGRAM(s) IV Push every 12 hours  insulin lispro (HumaLOG) corrective regimen sliding scale   SubCutaneous three times a day before meals  insulin lispro (HumaLOG) corrective regimen sliding scale   SubCutaneous at bedtime  lactobacillus acidophilus 1 Tablet(s) Oral daily  metoprolol succinate  milliGRAM(s) Oral daily  potassium chloride    Tablet ER 20 milliEquivalent(s) Oral two times a day  warfarin 7.5 milliGRAM(s) Oral daily      LABS: All Labs Reviewed:                        8.9    10.9  )-----------( 463      ( 07 Jan 2018 07:15 )             31.0     01-07    140  |  104  |  9   ----------------------------<  121<H>  3.8   |  28  |  0.51    Ca    8.8      07 Jan 2018 07:15    TPro  7.2  /  Alb  3.0<L>  /  TBili  0.4  /  DBili  x   /  AST  13<L>  /  ALT  19  /  AlkPhos  91  01-05    PT/INR - ( 07 Jan 2018 07:15 )   PT: 18.4 sec;   INR: 1.69 ratio         PTT - ( 05 Jan 2018 19:44 )  PTT:35.4 sec  CARDIAC MARKERS ( 06 Jan 2018 06:33 )  <0.015 ng/mL / x     / x     / x     / x      CARDIAC MARKERS ( 06 Jan 2018 00:55 )  <0.015 ng/mL / x     / x     / x     / x      CARDIAC MARKERS ( 05 Jan 2018 19:44 )  <0.015 ng/mL / x     / x     / x     / x          Blood Culture: 01-05 @ 19:44  Organism --  Gram Stain Blood -- Gram Stain --  Specimen Source .Blood None  Culture-Blood --        Assessment and Plan 	  68 y/o female with PMHx of DM2, Chronic CHF possibly diastolic,HTN, Chronic AFib (on Coumadin), Morbid Obesity, Chronic diarrhea started after cholecystectomy s/p colonoscopy 02/17 diverticulosis and hemorrhoids with negative rectal mucosa biopsy, Chronic Microcytic anemia, PVD of legs and chronic leg edema with stasis dermatitis presents to the ED c/o dyspnea x 2 weeks, gradually worsening. Sent in for evaluation by Dr. Mayer.           1- Acute on chronic congestive heart failure, unspecified congestive heart failure type.  Plan: IV Lasix BID. Discuss with cardiology. Daily weight. Monitor on tele. Diet.   -'s  -breathing improved since diureresis  -lower extremity still persistent, +2 w/ chronic venous skin changes      2- Rapid atrial fibrillation.  Plan: Order Toprol and Cardizem and monitor. Lovenox instead of Coumadin due to GI issues and anemia. Patient and cardiology agreed.   -inr 1.69  -c/w coumadin 7.5       3- Microcytic anemia.  Plan: Guaiac negative. Hgb 9.6 09/16. Monitor. Hold Coumadin. Iron studies.         4- Diarrheal disease. -chronic   -need to re-eval her meds before discharge; poss metformin ?        5- Peripheral venous insufficiency. Plan: Lasix. Leg elevation. Leg stocking. Weight management.        6-: Controlled type 2 diabetes mellitus with complication, without long-term current use of insulin.  Plan: Hold Metformin. Fingersticks with sliding scale insulin. HgbA1c. Diet.         7-: Hypertension, unspecified type.  Plan: Medications and diet. Monitor.       8-DVT proh- on coumadin

## 2018-01-07 NOTE — PROGRESS NOTE ADULT - ASSESSMENT
67 F with mild fluid overload state and possible PNA     recommend gentle diuresis, renal function ok, BID lasix for now      abx for possible PNA/ COPD      monitor BNP, renal function, keep k+ > 4 and mg > 2    anemia- GI evalaution pending    AF- on lovemox for now pending possible GI procedure, if no procedure, will resume coumadin

## 2018-01-07 NOTE — PROGRESS NOTE ADULT - SUBJECTIVE AND OBJECTIVE BOX
CHIEF COMPLAINT: Patient is a 67y old  Female who presents with a chief complaint of Increasing shortness of breath x 2 weeks with cough and anemic (06 Jan 2018 08:41)      HPI:  68 y/o female with PMHx of DM2, CHF, HTN, AFib (on Coumadin) presents to the ED c/o dyspnea x 2 weeks, gradually worsening. Sent in for evaluation by Dr. Mayer. Visited his office today. +mild productive cough, green yellow sputum. No fever. +chest discomfort only with coughing. +chronic LE edema, no change from baseline. No weight gain. +JETT after taking 5 steps. +orthopnea. Pt also c/o loose watery diarrhea, multiple episodes a day. +associated abd pain. No recent abx.      no acute issues overnight- still short of breath    PMHx: PAST MEDICAL & SURGICAL HISTORY:  HTN (hypertension)  Thyroid nodule  Peripheral venous insufficiency  Neuropathy  Morbid obesity with BMI of 40.0-44.9, adult  Dyspnea  Congestive heart failure  Atrial fibrillation  Diabetes mellitus type II, controlled  Status post medial meniscus repair  S/P left knee arthroscopy  Other complications of gastric band procedure  H/O colonoscopy  History of esophagogastroduodenoscopy (EGD)        Soc Hx: no current toxic habits      Allergies: Allergies    penicillin (Hives)    Intolerances          REVIEW OF SYSTEMS:    CONSTITUTIONAL: No weakness, fevers or chills  EYES/ENT: No visual changes;  No vertigo or throat pain   NECK: No pain or stiffness  RESPIRATORY: No cough, wheezing, hemoptysis; No shortness of breath  CARDIOVASCULAR: No chest pain or palpitations  GASTROINTESTINAL: No abdominal or epigastric pain. No nausea, vomiting, or hematemesis; No diarrhea or constipation. No melena or hematochezia.  GENITOURINARY: No dysuria, frequency or hematuria  NEUROLOGICAL: No numbness or weakness  SKIN: No itching, burning, rashes, or lesions   All other review of systems is negative unless indicated above    ICU Vital Signs Last 24 Hrs  T(C): 37 (06 Jan 2018 04:35), Max: 37 (06 Jan 2018 04:35)  T(F): 98.6 (06 Jan 2018 04:35), Max: 98.6 (06 Jan 2018 04:35)  HR: 99 (06 Jan 2018 05:45) (86 - 110)  BP: 119/68 (06 Jan 2018 05:45) (119/68 - 161/90)  BP(mean): --  ABP: --  ABP(mean): --  RR: 20 (06 Jan 2018 04:35) (18 - 20)  SpO2: 98% (06 Jan 2018 04:35) (98% - 98%)        PHYSICAL EXAM:   Constitutional: NAD, awake and alert, well-developed  HEENT: PERR, EOMI, Normal Hearing, MMM  Neck: Soft and supple, No LAD, No JVD  Respiratory: crackles bilaterally, mild wheezing   Cardiovascular: S1 and S2, regular rate and rhythm, no Murmurs, gallops or rubs  Gastrointestinal: Bowel Sounds present, soft, nontender, nondistended, no guarding, no rebound  Extremities: 2-3 + LE edema   Vascular: 2+ peripheral pulses  Neurological: A/O x 3, no focal deficits  Musculoskeletal: 5/5 strength b/l upper and lower extremities  Skin: No rashes    MEDICATIONS:  MEDICATIONS  (STANDING):  aspirin  chewable 81 milliGRAM(s) Oral daily  dextrose 5%. 1000 milliLiter(s) (50 mL/Hr) IV Continuous <Continuous>  dextrose 50% Injectable 12.5 Gram(s) IV Push once  dextrose 50% Injectable 25 Gram(s) IV Push once  dextrose 50% Injectable 25 Gram(s) IV Push once  diltiazem    Tablet 120 milliGRAM(s) Oral two times a day  enoxaparin Injectable 130 milliGRAM(s) SubCutaneous two times a day  furosemide   Injectable 40 milliGRAM(s) IV Push every 12 hours  insulin lispro (HumaLOG) corrective regimen sliding scale   SubCutaneous three times a day before meals  insulin lispro (HumaLOG) corrective regimen sliding scale   SubCutaneous at bedtime  lactobacillus acidophilus 1 Tablet(s) Oral daily  levoFLOXacin IVPB 750 milliGRAM(s) IV Intermittent every 24 hours  levoFLOXacin IVPB      metoprolol succinate  milliGRAM(s) Oral daily  potassium chloride    Tablet ER 20 milliEquivalent(s) Oral two times a day      LABS: All Labs Reviewed:                                   8.9    10.9  )-----------( 463      ( 07 Jan 2018 07:15 )             31.0   	     01-07    140  |  104  |  9   ----------------------------<  121<H>  3.8   |  28  |  0.51    Ca    8.8      07 Jan 2018 07:15    TPro  7.2  /  Alb  3.0<L>  /  TBili  0.4  /  DBili  x   /  AST  13<L>  /  ALT  19  /  AlkPhos  91  01-05        PTT - ( 05 Jan 2018 19:44 )  PTT:35.4 sec  CARDIAC MARKERS ( 06 Jan 2018 06:33 )  <0.015 ng/mL / x     / x     / x     / x      CARDIAC MARKERS ( 06 Jan 2018 00:55 )  <0.015 ng/mL / x     / x     / x     / x      CARDIAC MARKERS ( 05 Jan 2018 19:44 )  <0.015 ng/mL / x     / x     / x     / x          Serum Pro-Brain Natriuretic Peptide: 1130 pg/mL (01-05 @ 19:44)        EKG:  Telemetry:  AF HR mildly elevated

## 2018-01-07 NOTE — CONSULT NOTE ADULT - SUBJECTIVE AND OBJECTIVE BOX
HPI:  68 y/o female with PMHx of DM2, Chronic CHF possibly diastolic,HTN, Chronic AFib (on Coumadin), Morbid Obesity, Chronic diarrhea started after cholecystectomy s/p colonoscopy 02/17 diverticulosis and hemorrhoids with negative rectal mucosa biopsy, Chronic Microcytic anemia, PVD of legs and chronic leg edema with stasis dermatitis presents to the ED c/o dyspnea x 2 weeks, gradually worsening. Sent in for evaluation by Dr. Mayer. Visited his office today. +mild productive cough, green yellow sputum. No fever. + chest discomfort only with coughing. +chronic LE edema, no change from baseline. No weight gain. +JETT after taking 5 steps. +orthopnea. Pt also c/o loose watery diarrhea, multiple episodes a day. +associated abd pain. No recent abx. States her PMD advised her that she is anemic, dehydrated.  Patient admitted that not taking Lasix for last couple of days as was told that she is dehydrated. 3 pillow orthopnea with paroxysmal nocturnal dyspnea. (06 Jan 2018 08:41)    PAST MEDICAL & SURGICAL HISTORY:  HTN (hypertension)  Thyroid nodule  Peripheral venous insufficiency  Neuropathy  Morbid obesity with BMI of 40.0-44.9, adult  Dyspnea  Congestive heart failure  Atrial fibrillation  Diabetes mellitus type II, controlled  Status post medial meniscus repair  S/P left knee arthroscopy  Other complications of gastric band procedure  H/O colonoscopy  History of esophagogastroduodenoscopy (EGD)      MEDICATIONS  (STANDING):  aspirin  chewable 81 milliGRAM(s) Oral daily  azithromycin  IVPB      cefTRIAXone Injectable 1000 milliGRAM(s) IV Push daily  dextrose 5%. 1000 milliLiter(s) (50 mL/Hr) IV Continuous <Continuous>  dextrose 50% Injectable 12.5 Gram(s) IV Push once  dextrose 50% Injectable 25 Gram(s) IV Push once  dextrose 50% Injectable 25 Gram(s) IV Push once  diltiazem    Tablet 120 milliGRAM(s) Oral two times a day  enoxaparin Injectable 130 milliGRAM(s) SubCutaneous two times a day  furosemide   Injectable 40 milliGRAM(s) IV Push every 12 hours  insulin lispro (HumaLOG) corrective regimen sliding scale   SubCutaneous three times a day before meals  insulin lispro (HumaLOG) corrective regimen sliding scale   SubCutaneous at bedtime  lactobacillus acidophilus 1 Tablet(s) Oral daily  metoprolol succinate  milliGRAM(s) Oral daily  potassium chloride    Tablet ER 20 milliEquivalent(s) Oral two times a day  warfarin 7.5 milliGRAM(s) Oral daily    MEDICATIONS  (PRN):  dextrose Gel 1 Dose(s) Oral once PRN Blood Glucose LESS THAN 70 milliGRAM(s)/deciliter  glucagon  Injectable 1 milliGRAM(s) IntraMuscular once PRN Glucose LESS THAN 70 milligrams/deciliter      Allergies    penicillin (Hives)    Intolerances        SOCIAL HISTORY:    FAMILY HISTORY:  Family history of breast cancer in mother (Mother)      ROS  As above  Otherwise unremarkable    Vital Signs Last 24 Hrs  T(C): 36.6 (07 Jan 2018 10:08), Max: 36.9 (07 Jan 2018 06:03)  T(F): 97.9 (07 Jan 2018 10:08), Max: 98.5 (07 Jan 2018 06:03)  HR: 82 (07 Jan 2018 10:08) (76 - 87)  BP: 113/66 (07 Jan 2018 10:08) (113/66 - 127/73)  BP(mean): --  RR: 17 (07 Jan 2018 10:08) (16 - 18)  SpO2: 100% (07 Jan 2018 10:08) (100% - 100%)    Constitutional: NAD, well-developed  Respiratory: CTAB  Cardiovascular: S1 and S2,  Gastrointestinal: BS+, soft, NT/ND  Extremities: No peripheral edema  Psychiatric: Normal mood, normal affect  Skin: No rashes    LABS:                        8.9    10.9  )-----------( 463      ( 07 Jan 2018 07:15 )             31.0     01-07    140  |  104  |  9   ----------------------------<  121<H>  3.8   |  28  |  0.51    Ca    8.8      07 Jan 2018 07:15    TPro  7.2  /  Alb  3.0<L>  /  TBili  0.4  /  DBili  x   /  AST  13<L>  /  ALT  19  /  AlkPhos  91  01-05    PT/INR - ( 07 Jan 2018 07:15 )   PT: 18.4 sec;   INR: 1.69 ratio         PTT - ( 05 Jan 2018 19:44 )  PTT:35.4 sec  LIVER FUNCTIONS - ( 05 Jan 2018 19:44 )  Alb: 3.0 g/dL / Pro: 7.2 gm/dL / ALK PHOS: 91 U/L / ALT: 19 U/L / AST: 13 U/L / GGT: x             RADIOLOGY & ADDITIONAL STUDIES:

## 2018-01-07 NOTE — CONSULT NOTE ADULT - ASSESSMENT
Imp:  Chronic diarrhea  iron deficiency anemia  Rapid AF with CHF    Rec:  Elective EGD, perhaps later this hospitalization or outpatient  Dr. Hancock resumes care tomorrow

## 2018-01-08 LAB
ANION GAP SERPL CALC-SCNC: 5 MMOL/L — SIGNIFICANT CHANGE UP (ref 5–17)
APTT BLD: 30 SEC — SIGNIFICANT CHANGE UP (ref 27.5–37.4)
BUN SERPL-MCNC: 10 MG/DL — SIGNIFICANT CHANGE UP (ref 7–23)
CALCIUM SERPL-MCNC: 8.4 MG/DL — LOW (ref 8.5–10.1)
CHLORIDE SERPL-SCNC: 105 MMOL/L — SIGNIFICANT CHANGE UP (ref 96–108)
CO2 SERPL-SCNC: 29 MMOL/L — SIGNIFICANT CHANGE UP (ref 22–31)
CREAT SERPL-MCNC: 0.43 MG/DL — LOW (ref 0.5–1.3)
FOLATE SERPL-MCNC: 16.1 NG/ML — SIGNIFICANT CHANGE UP (ref 4.8–24.2)
GLUCOSE BLDC GLUCOMTR-MCNC: 143 MG/DL — HIGH (ref 70–99)
GLUCOSE BLDC GLUCOMTR-MCNC: 146 MG/DL — HIGH (ref 70–99)
GLUCOSE BLDC GLUCOMTR-MCNC: 146 MG/DL — HIGH (ref 70–99)
GLUCOSE BLDC GLUCOMTR-MCNC: 152 MG/DL — HIGH (ref 70–99)
GLUCOSE SERPL-MCNC: 101 MG/DL — HIGH (ref 70–99)
HCT VFR BLD CALC: 27.4 % — LOW (ref 34.5–45)
HGB BLD-MCNC: 7.9 G/DL — LOW (ref 11.5–15.5)
INR BLD: 1.39 RATIO — HIGH (ref 0.88–1.16)
MCHC RBC-ENTMCNC: 21 PG — LOW (ref 27–34)
MCHC RBC-ENTMCNC: 28.9 GM/DL — LOW (ref 32–36)
MCV RBC AUTO: 72.8 FL — LOW (ref 80–100)
PLATELET # BLD AUTO: 429 K/UL — HIGH (ref 150–400)
POTASSIUM SERPL-MCNC: 3.6 MMOL/L — SIGNIFICANT CHANGE UP (ref 3.5–5.3)
POTASSIUM SERPL-SCNC: 3.6 MMOL/L — SIGNIFICANT CHANGE UP (ref 3.5–5.3)
PROTHROM AB SERPL-ACNC: 15.1 SEC — HIGH (ref 9.8–12.7)
RBC # BLD: 3.76 M/UL — LOW (ref 3.8–5.2)
RBC # FLD: 17.2 % — HIGH (ref 10.3–14.5)
SODIUM SERPL-SCNC: 139 MMOL/L — SIGNIFICANT CHANGE UP (ref 135–145)
VIT B12 SERPL-MCNC: 312 PG/ML — SIGNIFICANT CHANGE UP (ref 232–1245)
WBC # BLD: 9.3 K/UL — SIGNIFICANT CHANGE UP (ref 3.8–10.5)
WBC # FLD AUTO: 9.3 K/UL — SIGNIFICANT CHANGE UP (ref 3.8–10.5)

## 2018-01-08 RX ADMIN — Medication 1: at 11:29

## 2018-01-08 RX ADMIN — Medication 650 MILLIGRAM(S): at 00:36

## 2018-01-08 RX ADMIN — Medication 40 MILLIGRAM(S): at 06:52

## 2018-01-08 RX ADMIN — Medication 1 TABLET(S): at 11:29

## 2018-01-08 RX ADMIN — Medication 100 MILLIGRAM(S): at 06:52

## 2018-01-08 RX ADMIN — Medication 20 MILLIEQUIVALENT(S): at 06:51

## 2018-01-08 RX ADMIN — WARFARIN SODIUM 7.5 MILLIGRAM(S): 2.5 TABLET ORAL at 22:38

## 2018-01-08 RX ADMIN — Medication 81 MILLIGRAM(S): at 11:29

## 2018-01-08 NOTE — PROGRESS NOTE ADULT - SUBJECTIVE AND OBJECTIVE BOX
CHIEF COMPLAINT: Patient is a 67y old  Female who presents with a chief complaint of Increasing shortness of breath x 2 weeks with cough and anemic (06 Jan 2018 08:41)      HPI:  66 y/o female with PMHx of DM2, CHF, HTN, AFib (on Coumadin) presents to the ED c/o dyspnea x 2 weeks, gradually worsening. Sent in for evaluation by Dr. Mayer. Visited his office today. +mild productive cough, green yellow sputum. No fever. +chest discomfort only with coughing. +chronic LE edema, no change from baseline. No weight gain. +JETT after taking 5 steps. +orthopnea. Pt also c/o loose watery diarrhea, multiple episodes a day. +associated abd pain. No recent abx.      no acute issues overnight- still short of breath    PMHx: PAST MEDICAL & SURGICAL HISTORY:  HTN (hypertension)  Thyroid nodule  Peripheral venous insufficiency  Neuropathy  Morbid obesity with BMI of 40.0-44.9, adult  Dyspnea  Congestive heart failure  Atrial fibrillation  Diabetes mellitus type II, controlled  Status post medial meniscus repair  S/P left knee arthroscopy  Other complications of gastric band procedure  H/O colonoscopy  History of esophagogastroduodenoscopy (EGD)        Soc Hx: no current toxic habits      Allergies: Allergies    penicillin (Hives)    Intolerances          REVIEW OF SYSTEMS:    CONSTITUTIONAL: No weakness, fevers or chills  EYES/ENT: No visual changes;  No vertigo or throat pain   NECK: No pain or stiffness  RESPIRATORY: No cough, wheezing, hemoptysis; No shortness of breath  CARDIOVASCULAR: No chest pain or palpitations  GASTROINTESTINAL: No abdominal or epigastric pain. No nausea, vomiting, or hematemesis; No diarrhea or constipation. No melena or hematochezia.  GENITOURINARY: No dysuria, frequency or hematuria  NEUROLOGICAL: No numbness or weakness  SKIN: No itching, burning, rashes, or lesions   All other review of systems is negative unless indicated above    ICU Vital Signs Last 24 Hrs  T(C): 36.2 (08 Jan 2018 05:34), Max: 37.1 (07 Jan 2018 17:06)  T(F): 97.1 (08 Jan 2018 05:34), Max: 98.7 (07 Jan 2018 17:06)  HR: 88 (08 Jan 2018 07:00) (62 - 88)  BP: 164/79 (08 Jan 2018 07:00) (100/43 - 164/79)  BP(mean): 96 (08 Jan 2018 07:00) (54 - 102)  ABP: --  ABP(mean): --  RR: 24 (08 Jan 2018 07:00) (13 - 24)  SpO2: 97% (08 Jan 2018 07:00) (92% - 100%)          PHYSICAL EXAM:   Constitutional: NAD, awake and alert, well-developed  HEENT: PERR, EOMI, Normal Hearing, MMM  Neck: Soft and supple, No LAD, No JVD  Respiratory: crackles bilaterally, mild wheezing   Cardiovascular: S1 and S2, regular rate and rhythm, no Murmurs, gallops or rubs  Gastrointestinal: Bowel Sounds present, soft, nontender, nondistended, no guarding, no rebound  Extremities: 2-3 + LE edema   Vascular: 2+ peripheral pulses  Neurological: A/O x 3, no focal deficits  Musculoskeletal: 5/5 strength b/l upper and lower extremities  Skin: No rashes    MEDICATIONS  (STANDING):  aspirin  chewable 81 milliGRAM(s) Oral daily  dextrose 5%. 1000 milliLiter(s) (50 mL/Hr) IV Continuous <Continuous>  dextrose 50% Injectable 12.5 Gram(s) IV Push once  dextrose 50% Injectable 25 Gram(s) IV Push once  dextrose 50% Injectable 25 Gram(s) IV Push once  diltiazem    Tablet 120 milliGRAM(s) Oral two times a day  furosemide   Injectable 40 milliGRAM(s) IV Push every 12 hours  insulin lispro (HumaLOG) corrective regimen sliding scale   SubCutaneous three times a day before meals  insulin lispro (HumaLOG) corrective regimen sliding scale   SubCutaneous at bedtime  lactobacillus acidophilus 1 Tablet(s) Oral daily  metoprolol succinate  milliGRAM(s) Oral daily  potassium chloride    Tablet ER 20 milliEquivalent(s) Oral two times a day  warfarin 7.5 milliGRAM(s) Oral daily          LABS: All Labs Reviewed:                                   8.9    10.9  )-----------( 463      ( 07 Jan 2018 07:15 )             31.0                           7.9    9.3   )-----------( 429      ( 08 Jan 2018 04:39 )             27.4     	     01-07    I&O's Summary    07 Jan 2018 07:01  -  08 Jan 2018 07:00  --------------------------------------------------------  IN: 120 mL / OUT: 0 mL / NET: 120 mL    140  |  104  |  9   ----------------------------<  121<H>  3.8   |  28  |  0.51    Ca    8.8      07 Jan 2018 07:15    TPro  7.2  /  Alb  3.0<L>  /  TBili  0.4  /  DBili  x   /  AST  13<L>  /  ALT  19  /  AlkPhos  91  01-05 01-08    139  |  105  |  10  ----------------------------<  101<H>  3.6   |  29  |  0.43<L>    Ca    8.4<L>      08 Jan 2018 04:39            PTT - ( 05 Jan 2018 19:44 )  PTT:35.4 sec  CARDIAC MARKERS ( 06 Jan 2018 06:33 )  <0.015 ng/mL / x     / x     / x     / x      CARDIAC MARKERS ( 06 Jan 2018 00:55 )  <0.015 ng/mL / x     / x     / x     / x      CARDIAC MARKERS ( 05 Jan 2018 19:44 )  <0.015 ng/mL / x     / x     / x     / x          Serum Pro-Brain Natriuretic Peptide: 1130 pg/mL (01-05 @ 19:44)        EKG:  Telemetry:  AF HR mildly elevated

## 2018-01-08 NOTE — PROGRESS NOTE ADULT - ASSESSMENT
67 F with mild fluid overload state and possible PNA     recommend gentle diuresis, renal function ok, BID lasix for now      abx for possible PNA/ COPD      monitor BNP, renal function, keep k+ > 4 and mg > 2    anemia- GI evalaution pending    AF- on lovemox for now pending possible GI procedure, if no procedure, will resume coumadin      1/8/18:  Cardiac status relatively stable.  Increase ambulation and if stable she can be discharged home.  To be seen by Dr Hancock today-patient is more anemic.  ? EGD

## 2018-01-08 NOTE — PROGRESS NOTE ADULT - SUBJECTIVE AND OBJECTIVE BOX
CHIEF COMPLAINT/Diagnosis: acute ex CHF/ lower ext edema/ afib    SUBJECTIVE: no complaints    Vital Signs Last 24 Hrs  T(C): 36.2 (08 Jan 2018 05:34), Max: 37.1 (07 Jan 2018 17:06)  T(F): 97.1 (08 Jan 2018 05:34), Max: 98.7 (07 Jan 2018 17:06)  HR: 69 (08 Jan 2018 10:00) (62 - 101)  BP: 119/56 (08 Jan 2018 09:00) (100/43 - 164/79)  BP(mean): 69 (08 Jan 2018 09:00) (54 - 102)  RR: 20 (08 Jan 2018 09:00) (11 - 24)  SpO2: 97% (08 Jan 2018 09:00) (92% - 97%)      CAPILLARY BLOOD GLUCOSE      POCT Blood Glucose.: 188 mg/dL (07 Jan 2018 11:22)  POCT Blood Glucose.: 145 mg/dL (06 Jan 2018 21:13)  POCT Blood Glucose.: 130 mg/dL (06 Jan 2018 17:30)      PHYSICAL EXAM:    Constitutional: NAD, awake and alert, well-developed  HEENT: PERR, EOMI, Normal Hearing, MMM  Neck: Soft and supple, No LAD, No JVD  Respiratory: Breath sounds are clear bilaterally, No wheezing, rales or rhonchi  Cardiovascular: S1 and S2, regular rate and rhythm, no Murmurs, gallops or rubs  Gastrointestinal: Bowel Sounds present, soft, nontender, nondistended, no guarding, no rebound  Extremities: No peripheral edema  Vascular: 2+ peripheral pulses  Neurological: A/O x 3, no focal deficits  Musculoskeletal: 5/5 strength b/l upper and lower extremities  Skin: No rashes        LABS: All Labs Reviewed:                       * Acute on chronic congestive heart failure, unspecified congestive heart failure type.  Plan: IV Lasix BID  -'s  -breathing improved   -lower extremity still persistent, +2 w/ chronic venous skin changes      * Rapid atrial fibrillation.  Plan: Order Toprol and Cardizem and monitor. Lovenox instead of Coumadin due to GI issues and anemia. Patient and cardiology agreed.   -inr 1.69  -c/w coumadin 7.5       *Microcytic anemia.  Plan: Guaiac negative. Hgb 9.6 09/16. Monitor. Found pt on VKA; not olga if she will be scoped  - c/w vka for now      * Diarrheal disease. -chronic     * T2 DM

## 2018-01-09 ENCOUNTER — TRANSCRIPTION ENCOUNTER (OUTPATIENT)
Age: 68
End: 2018-01-09

## 2018-01-09 VITALS — WEIGHT: 283.07 LBS

## 2018-01-09 LAB
ANION GAP SERPL CALC-SCNC: 6 MMOL/L — SIGNIFICANT CHANGE UP (ref 5–17)
BUN SERPL-MCNC: 12 MG/DL — SIGNIFICANT CHANGE UP (ref 7–23)
CALCIUM SERPL-MCNC: 8.2 MG/DL — LOW (ref 8.5–10.1)
CHLORIDE SERPL-SCNC: 106 MMOL/L — SIGNIFICANT CHANGE UP (ref 96–108)
CO2 SERPL-SCNC: 28 MMOL/L — SIGNIFICANT CHANGE UP (ref 22–31)
CREAT SERPL-MCNC: 0.41 MG/DL — LOW (ref 0.5–1.3)
GLUCOSE BLDC GLUCOMTR-MCNC: 128 MG/DL — HIGH (ref 70–99)
GLUCOSE BLDC GLUCOMTR-MCNC: 153 MG/DL — HIGH (ref 70–99)
GLUCOSE SERPL-MCNC: 112 MG/DL — HIGH (ref 70–99)
HCT VFR BLD CALC: 27.9 % — LOW (ref 34.5–45)
HGB BLD-MCNC: 8.1 G/DL — LOW (ref 11.5–15.5)
MCHC RBC-ENTMCNC: 21.1 PG — LOW (ref 27–34)
MCHC RBC-ENTMCNC: 29.2 GM/DL — LOW (ref 32–36)
MCV RBC AUTO: 72.3 FL — LOW (ref 80–100)
PLATELET # BLD AUTO: 432 K/UL — HIGH (ref 150–400)
POTASSIUM SERPL-MCNC: 3.6 MMOL/L — SIGNIFICANT CHANGE UP (ref 3.5–5.3)
POTASSIUM SERPL-SCNC: 3.6 MMOL/L — SIGNIFICANT CHANGE UP (ref 3.5–5.3)
RBC # BLD: 3.86 M/UL — SIGNIFICANT CHANGE UP (ref 3.8–5.2)
RBC # FLD: 16.8 % — HIGH (ref 10.3–14.5)
SODIUM SERPL-SCNC: 140 MMOL/L — SIGNIFICANT CHANGE UP (ref 135–145)
WBC # BLD: 9.1 K/UL — SIGNIFICANT CHANGE UP (ref 3.8–10.5)
WBC # FLD AUTO: 9.1 K/UL — SIGNIFICANT CHANGE UP (ref 3.8–10.5)

## 2018-01-09 RX ORDER — WARFARIN SODIUM 2.5 MG/1
1 TABLET ORAL
Qty: 0 | Refills: 0 | DISCHARGE
Start: 2018-01-09

## 2018-01-09 RX ORDER — ACETAMINOPHEN 500 MG
650 TABLET ORAL ONCE
Qty: 0 | Refills: 0 | Status: COMPLETED | OUTPATIENT
Start: 2018-01-09 | End: 2018-01-09

## 2018-01-09 RX ADMIN — Medication 81 MILLIGRAM(S): at 11:12

## 2018-01-09 RX ADMIN — Medication 100 MILLIGRAM(S): at 06:11

## 2018-01-09 RX ADMIN — Medication 40 MILLIGRAM(S): at 06:11

## 2018-01-09 RX ADMIN — Medication 650 MILLIGRAM(S): at 00:32

## 2018-01-09 RX ADMIN — Medication 1 TABLET(S): at 11:12

## 2018-01-09 RX ADMIN — Medication 20 MILLIEQUIVALENT(S): at 06:11

## 2018-01-09 NOTE — PROGRESS NOTE ADULT - SUBJECTIVE AND OBJECTIVE BOX
Patient is a 67y old  Female who presents with a chief complaint of Increasing shortness of breath x 2 weeks with cough and anemic (09 Jan 2018 09:23)      HPI:  pt feeling well  no sob or chest pain  no melena  going home today    PAST MEDICAL & SURGICAL HISTORY:  HTN (hypertension)  Thyroid nodule  Peripheral venous insufficiency  Neuropathy  Morbid obesity with BMI of 40.0-44.9, adult  Dyspnea  Congestive heart failure  Atrial fibrillation  Diabetes mellitus type II, controlled  Status post medial meniscus repair  S/P left knee arthroscopy  Other complications of gastric band procedure  H/O colonoscopy  History of esophagogastroduodenoscopy (EGD)    MEDICATIONS  (STANDING):  aspirin  chewable 81 milliGRAM(s) Oral daily  dextrose 5%. 1000 milliLiter(s) (50 mL/Hr) IV Continuous <Continuous>  dextrose 50% Injectable 12.5 Gram(s) IV Push once  dextrose 50% Injectable 25 Gram(s) IV Push once  dextrose 50% Injectable 25 Gram(s) IV Push once  diltiazem    Tablet 120 milliGRAM(s) Oral two times a day  furosemide   Injectable 40 milliGRAM(s) IV Push every 12 hours  insulin lispro (HumaLOG) corrective regimen sliding scale   SubCutaneous three times a day before meals  insulin lispro (HumaLOG) corrective regimen sliding scale   SubCutaneous at bedtime  lactobacillus acidophilus 1 Tablet(s) Oral daily  metoprolol succinate  milliGRAM(s) Oral daily  potassium chloride    Tablet ER 20 milliEquivalent(s) Oral two times a day  warfarin 7.5 milliGRAM(s) Oral daily    MEDICATIONS  (PRN):  dextrose Gel 1 Dose(s) Oral once PRN Blood Glucose LESS THAN 70 milliGRAM(s)/deciliter  glucagon  Injectable 1 milliGRAM(s) IntraMuscular once PRN Glucose LESS THAN 70 milligrams/deciliter    Allergies  penicillin (Hives)    REVIEW OF SYSTEMS:    CONSTITUTIONAL: No weakness, fevers or chills  RESPIRATORY: No cough, wheezing, hemoptysis; No shortness of breath  CARDIOVASCULAR: No chest pain or palpitations  GASTROINTESTINAL: No abdominal or epigastric pain. No nausea, vomiting, or hematemesis; No diarrhea or constipation. No melena or hematochezia.  All other review of systems is negative unless indicated above.    Vital Signs Last 24 Hrs  T(C): 36.4 (09 Jan 2018 06:22), Max: 36.5 (08 Jan 2018 23:27)  T(F): 97.6 (09 Jan 2018 06:22), Max: 97.7 (08 Jan 2018 23:27)  HR: 82 (09 Jan 2018 09:00) (64 - 96)  BP: 133/65 (09 Jan 2018 05:00) (104/45 - 142/60)  BP(mean): 80 (09 Jan 2018 05:00) (56 - 88)  RR: 20 (09 Jan 2018 00:00) (16 - 22)  SpO2: 96% (09 Jan 2018 05:00) (94% - 100%)    PHYSICAL EXAM:    Constitutional: NAD, well-developed  Respiratory: CTA  Cardiovascular: S1 and S2  Gastrointestinal: BS+, soft, NT/ND      LABS:                        8.1    9.1   )-----------( 432      ( 09 Jan 2018 05:26 )             27.9     01-09    140  |  106  |  12  ----------------------------<  112<H>  3.6   |  28  |  0.41<L>    Ca    8.2<L>      09 Jan 2018 05:26      PT/INR - ( 08 Jan 2018 04:39 )   PT: 15.1 sec;   INR: 1.39 ratio         PTT - ( 08 Jan 2018 04:39 )  PTT:30.0 sec      RADIOLOGY & ADDITIONAL STUDIES:

## 2018-01-09 NOTE — DISCHARGE NOTE ADULT - CARE PLAN
Principal Discharge DX:	Acute on chronic congestive heart failure, unspecified congestive heart failure type  Goal:	resolved  Instructions for follow-up, activity and diet:	same as before

## 2018-01-09 NOTE — DISCHARGE NOTE ADULT - HOSPITAL COURSE
CHIEF COMPLAINT/Diagnosis: acute ex CHF/ lower ext edema/ afib      PHYSICAL EXAM:    Constitutional: NAD, awake and alert, well-developed  HEENT: PERR, EOMI, Normal Hearing, MMM  Neck: Soft and supple, No LAD, No JVD  Respiratory: Breath sounds are clear bilaterally, No wheezing, rales or rhonchi  Cardiovascular: S1 and S2, regular rate and rhythm, no Murmurs, gallops or rubs  Gastrointestinal: Bowel Sounds present, soft, nontender, nondistended, no guarding, no rebound  Extremities: No peripheral edema  Vascular: 2+ peripheral pulses  Neurological: A/O x 3, no focal deficits  Musculoskeletal: 5/5 strength b/l upper and lower extremities  Skin: No rashes          * Acute on chronic congestive heart failure, unspecified congestive heart failure type.  Plan: IV Lasix BID  -'s  -breathing improved   -lower extremity still persistent, +2 w/ chronic venous skin changes      * Rapid atrial fibrillation.  Plan: Order Toprol and Cardizem and monitor. Lovenox instead of Coumadin due to GI issues and anemia. Patient and cardiology agreed.   -inr 1.69  -c/w coumadin 7.5       *Microcytic anemia.  Plan: Guaiac negative. Hgb 9.6 09/16. Monitor. Found pt on VKA; not olga if she will be scoped  - c/w vka for now      * Diarrheal disease. -chronic     * T2 DM

## 2018-01-09 NOTE — PROVIDER CONTACT NOTE (OTHER) - SITUATION
Patient H/H drop to 7.9/27.4
CAP and mild Rapid afib  Service aware
Microcytic anemia  Service aware

## 2018-01-09 NOTE — DISCHARGE NOTE ADULT - MEDICATION SUMMARY - MEDICATIONS TO TAKE
I will START or STAY ON the medications listed below when I get home from the hospital:    aspirin 81 mg oral tablet  -- 1 tab(s) by mouth once a day  -- Indication: For .    dilTIAZem 120 mg oral tablet  -- 2 tab(s) by mouth 2 times a day  -- Indication: For .    warfarin 7.5 mg oral tablet  -- 1 tab(s) by mouth once a day  -- Indication: For .    metFORMIN 1000 mg oral tablet  -- 1 tab(s) by mouth 2 times a day  -- Indication: For .    Metoprolol Succinate  mg oral tablet, extended release  -- 1 tab(s) by mouth once a day  -- Indication: For .    furosemide 40 mg oral tablet  -- 1  by mouth 2 times a day, may increase to 3 a day as tolerated for edema control  -- Indication: For .    potassium chloride 20 mEq oral tablet, extended release  -- 1 tab(s) by mouth 2 times a day  -- Indication: For .

## 2018-01-09 NOTE — DISCHARGE NOTE ADULT - PATIENT PORTAL LINK FT
“You can access the [facility] Patient Portal, offered by [facility], by registering with the following website: [email or web address]”

## 2018-01-09 NOTE — PROGRESS NOTE ADULT - ASSESSMENT
67 F with mild fluid overload state and possible PNA     recommend gentle diuresis, renal function ok, BID lasix for now      abx for possible PNA/ COPD      monitor BNP, renal function, keep k+ > 4 and mg > 2    anemia- GI evalaution pending    AF- on lovemox for now pending possible GI procedure, if no procedure, will resume coumadin      1/8/18:  Cardiac status relatively stable.  Increase ambulation and if stable she can be discharged home.  To be seen by Dr Hancock today-patient is more anemic.  ? EGD    1/9/18:  Cardiac status and hemoglobin stable.  Can be discharged home.  Follow up with Dr Hancock as an outpatient.

## 2018-01-09 NOTE — PROGRESS NOTE ADULT - ASSESSMENT
68yo with CHF, iron-deficiency anemia    pt for d/c today  outpt f/u for egd within week  d/w pt in detail

## 2018-01-09 NOTE — PROGRESS NOTE ADULT - SUBJECTIVE AND OBJECTIVE BOX
CHIEF COMPLAINT: Patient is a 67y old  Female who presents with a chief complaint of Increasing shortness of breath x 2 weeks with cough and anemic (06 Jan 2018 08:41)      HPI:  68 y/o female with PMHx of DM2, CHF, HTN, AFib (on Coumadin) presents to the ED c/o dyspnea x 2 weeks, gradually worsening. Sent in for evaluation by Dr. Mayer. Visited his office today. +mild productive cough, green yellow sputum. No fever. +chest discomfort only with coughing. +chronic LE edema, no change from baseline. No weight gain. +JETT after taking 5 steps. +orthopnea. Pt also c/o loose watery diarrhea, multiple episodes a day. +associated abd pain. No recent abx.      no acute issues overnight- still short of breath    PMHx: PAST MEDICAL & SURGICAL HISTORY:  HTN (hypertension)  Thyroid nodule  Peripheral venous insufficiency  Neuropathy  Morbid obesity with BMI of 40.0-44.9, adult  Dyspnea  Congestive heart failure  Atrial fibrillation  Diabetes mellitus type II, controlled  Status post medial meniscus repair  S/P left knee arthroscopy  Other complications of gastric band procedure  H/O colonoscopy  History of esophagogastroduodenoscopy (EGD)        Soc Hx: no current toxic habits      Allergies: Allergies    penicillin (Hives)    Intolerances          REVIEW OF SYSTEMS:    CONSTITUTIONAL: No weakness, fevers or chills  EYES/ENT: No visual changes;  No vertigo or throat pain   NECK: No pain or stiffness  RESPIRATORY: No cough, wheezing, hemoptysis; No shortness of breath  CARDIOVASCULAR: No chest pain or palpitations  GASTROINTESTINAL: No abdominal or epigastric pain. No nausea, vomiting, or hematemesis; No diarrhea or constipation. No melena or hematochezia.  GENITOURINARY: No dysuria, frequency or hematuria  NEUROLOGICAL: No numbness or weakness  SKIN: No itching, burning, rashes, or lesions   All other review of systems is negative unless indicated above    ICU Vital Signs Last 24 Hrs  T(C): 36.4 (09 Jan 2018 06:22), Max: 36.5 (08 Jan 2018 23:27)  T(F): 97.6 (09 Jan 2018 06:22), Max: 97.7 (08 Jan 2018 23:27)  HR: 69 (09 Jan 2018 05:00) (64 - 101)  BP: 133/65 (09 Jan 2018 05:00) (104/45 - 142/60)  BP(mean): 80 (09 Jan 2018 05:00) (56 - 88)  ABP: --  ABP(mean): --  RR: 20 (09 Jan 2018 00:00) (11 - 22)  SpO2: 96% (09 Jan 2018 05:00) (94% - 100%)            PHYSICAL EXAM:   Constitutional: NAD, awake and alert, well-developed  HEENT: PERR, EOMI, Normal Hearing, MMM  Neck: Soft and supple, No LAD, No JVD  Respiratory: crackles bilaterally, mild wheezing   Cardiovascular: S1 and S2, regular rate and rhythm, no Murmurs, gallops or rubs  Gastrointestinal: Bowel Sounds present, soft, nontender, nondistended, no guarding, no rebound  Extremities: 2-3 + LE edema   Vascular: 2+ peripheral pulses  Neurological: A/O x 3, no focal deficits  Musculoskeletal: 5/5 strength b/l upper and lower extremities  Skin: No rashes    MEDICATIONS  (STANDING):  aspirin  chewable 81 milliGRAM(s) Oral daily  dextrose 5%. 1000 milliLiter(s) (50 mL/Hr) IV Continuous <Continuous>  dextrose 50% Injectable 12.5 Gram(s) IV Push once  dextrose 50% Injectable 25 Gram(s) IV Push once  dextrose 50% Injectable 25 Gram(s) IV Push once  diltiazem    Tablet 120 milliGRAM(s) Oral two times a day  furosemide   Injectable 40 milliGRAM(s) IV Push every 12 hours  insulin lispro (HumaLOG) corrective regimen sliding scale   SubCutaneous three times a day before meals  insulin lispro (HumaLOG) corrective regimen sliding scale   SubCutaneous at bedtime  lactobacillus acidophilus 1 Tablet(s) Oral daily  metoprolol succinate  milliGRAM(s) Oral daily  potassium chloride    Tablet ER 20 milliEquivalent(s) Oral two times a day  warfarin 7.5 milliGRAM(s) Oral daily            LABS: All Labs Reviewed:                                   8.9    10.9  )-----------( 463      ( 07 Jan 2018 07:15 )             31.0                           7.9    9.3   )-----------( 429      ( 08 Jan 2018 04:39 )             27.4                           8.1    9.1   )-----------( 432      ( 09 Jan 2018 05:26 )             27.9       	     01-07    I&O's Summary    07 Jan 2018 07:01  -  08 Jan 2018 07:00  --------------------------------------------------------  IN: 120 mL / OUT: 0 mL / NET: 120 mL    140  |  104  |  9   ----------------------------<  121<H>  3.8   |  28  |  0.51    Ca    8.8      07 Jan 2018 07:15    TPro  7.2  /  Alb  3.0<L>  /  TBili  0.4  /  DBili  x   /  AST  13<L>  /  ALT  19  /  AlkPhos  91  01-05 01-08    139  |  105  |  10  ----------------------------<  101<H>  3.6   |  29  |  0.43<L>    Ca    8.4<L>      08 Jan 2018 04:39    01-09    140  |  106  |  12  ----------------------------<  112<H>  3.6   |  28  |  0.41<L>    Ca    8.2<L>      09 Jan 2018 05:26    Prothrombin Time and INR, Plasma in AM (01.08.18 @ 04:39)    Prothrombin Time, Plasma: 15.1: Effective March 21st, the reference range for PT has changed. sec    INR: 1.39 ratio                  PTT - ( 05 Jan 2018 19:44 )  PTT:35.4 sec  CARDIAC MARKERS ( 06 Jan 2018 06:33 )  <0.015 ng/mL / x     / x     / x     / x      CARDIAC MARKERS ( 06 Jan 2018 00:55 )  <0.015 ng/mL / x     / x     / x     / x      CARDIAC MARKERS ( 05 Jan 2018 19:44 )  <0.015 ng/mL / x     / x     / x     / x          Serum Pro-Brain Natriuretic Peptide: 1130 pg/mL (01-05 @ 19:44)  Serum Pro-Brain Natriuretic Peptide (01.07.18 @ 07:15)    Serum Pro-Brain Natriuretic Peptide: 903 pg/mL            EKG:  Telemetry:  AF

## 2018-01-09 NOTE — DISCHARGE NOTE ADULT - CARE PROVIDER_API CALL
Justo Mayer (MD), Cardiovascular Disease; Internal Medicine  [address] [place] [address]  Phone: [phone]  Fax: [phone]

## 2018-01-10 LAB
CULTURE RESULTS: SIGNIFICANT CHANGE UP
CULTURE RESULTS: SIGNIFICANT CHANGE UP
SPECIMEN SOURCE: SIGNIFICANT CHANGE UP
SPECIMEN SOURCE: SIGNIFICANT CHANGE UP

## 2018-01-13 DIAGNOSIS — D50.9 IRON DEFICIENCY ANEMIA, UNSPECIFIED: ICD-10-CM

## 2018-01-13 DIAGNOSIS — I87.2 VENOUS INSUFFICIENCY (CHRONIC) (PERIPHERAL): ICD-10-CM

## 2018-01-13 DIAGNOSIS — Z79.82 LONG TERM (CURRENT) USE OF ASPIRIN: ICD-10-CM

## 2018-01-13 DIAGNOSIS — Z79.84 LONG TERM (CURRENT) USE OF ORAL HYPOGLYCEMIC DRUGS: ICD-10-CM

## 2018-01-13 DIAGNOSIS — E11.51 TYPE 2 DIABETES MELLITUS WITH DIABETIC PERIPHERAL ANGIOPATHY WITHOUT GANGRENE: ICD-10-CM

## 2018-01-13 DIAGNOSIS — I50.33 ACUTE ON CHRONIC DIASTOLIC (CONGESTIVE) HEART FAILURE: ICD-10-CM

## 2018-01-13 DIAGNOSIS — Z90.49 ACQUIRED ABSENCE OF OTHER SPECIFIED PARTS OF DIGESTIVE TRACT: ICD-10-CM

## 2018-01-13 DIAGNOSIS — I11.0 HYPERTENSIVE HEART DISEASE WITH HEART FAILURE: ICD-10-CM

## 2018-01-13 DIAGNOSIS — I48.2 CHRONIC ATRIAL FIBRILLATION: ICD-10-CM

## 2018-01-13 DIAGNOSIS — Z88.0 ALLERGY STATUS TO PENICILLIN: ICD-10-CM

## 2018-01-13 DIAGNOSIS — E04.1 NONTOXIC SINGLE THYROID NODULE: ICD-10-CM

## 2018-01-13 DIAGNOSIS — Z79.01 LONG TERM (CURRENT) USE OF ANTICOAGULANTS: ICD-10-CM

## 2018-01-13 DIAGNOSIS — E11.40 TYPE 2 DIABETES MELLITUS WITH DIABETIC NEUROPATHY, UNSPECIFIED: ICD-10-CM

## 2018-01-13 DIAGNOSIS — K52.9 NONINFECTIVE GASTROENTERITIS AND COLITIS, UNSPECIFIED: ICD-10-CM

## 2018-01-13 DIAGNOSIS — R06.02 SHORTNESS OF BREATH: ICD-10-CM

## 2018-01-19 PROBLEM — I10 ESSENTIAL (PRIMARY) HYPERTENSION: Chronic | Status: ACTIVE | Noted: 2018-01-05

## 2018-01-24 ENCOUNTER — OUTPATIENT (OUTPATIENT)
Dept: OUTPATIENT SERVICES | Facility: HOSPITAL | Age: 68
LOS: 1 days | Discharge: HOME | End: 2018-01-24
Payer: COMMERCIAL

## 2018-01-24 ENCOUNTER — RESULT REVIEW (OUTPATIENT)
Age: 68
End: 2018-01-24

## 2018-01-24 VITALS
RESPIRATION RATE: 20 BRPM | WEIGHT: 293 LBS | OXYGEN SATURATION: 95 % | TEMPERATURE: 98 F | SYSTOLIC BLOOD PRESSURE: 145 MMHG | HEART RATE: 86 BPM | HEIGHT: 70 IN | DIASTOLIC BLOOD PRESSURE: 78 MMHG

## 2018-01-24 DIAGNOSIS — Z98.890 OTHER SPECIFIED POSTPROCEDURAL STATES: Chronic | ICD-10-CM

## 2018-01-24 DIAGNOSIS — K95.09 OTHER COMPLICATIONS OF GASTRIC BAND PROCEDURE: Chronic | ICD-10-CM

## 2018-01-24 LAB
APTT BLD: 34 SEC — SIGNIFICANT CHANGE UP (ref 27.5–37.4)
INR BLD: 1.42 RATIO — HIGH (ref 0.88–1.16)
PROTHROM AB SERPL-ACNC: 15.5 SEC — HIGH (ref 9.8–12.7)

## 2018-01-24 PROCEDURE — 88312 SPECIAL STAINS GROUP 1: CPT | Mod: 26

## 2018-01-24 PROCEDURE — 88305 TISSUE EXAM BY PATHOLOGIST: CPT | Mod: 26

## 2018-01-24 PROCEDURE — 93010 ELECTROCARDIOGRAM REPORT: CPT

## 2018-01-24 RX ORDER — SODIUM CHLORIDE 9 MG/ML
1000 INJECTION INTRAMUSCULAR; INTRAVENOUS; SUBCUTANEOUS
Qty: 0 | Refills: 0 | Status: DISCONTINUED | OUTPATIENT
Start: 2018-01-24 | End: 2018-02-08

## 2018-01-25 LAB — SURGICAL PATHOLOGY FINAL REPORT - CH: SIGNIFICANT CHANGE UP

## 2018-01-27 DIAGNOSIS — I48.91 UNSPECIFIED ATRIAL FIBRILLATION: ICD-10-CM

## 2018-01-27 DIAGNOSIS — E11.9 TYPE 2 DIABETES MELLITUS WITHOUT COMPLICATIONS: ICD-10-CM

## 2018-01-27 DIAGNOSIS — D50.9 IRON DEFICIENCY ANEMIA, UNSPECIFIED: ICD-10-CM

## 2018-01-27 DIAGNOSIS — E66.01 MORBID (SEVERE) OBESITY DUE TO EXCESS CALORIES: ICD-10-CM

## 2018-01-27 DIAGNOSIS — Z79.4 LONG TERM (CURRENT) USE OF INSULIN: ICD-10-CM

## 2018-01-27 DIAGNOSIS — R19.7 DIARRHEA, UNSPECIFIED: ICD-10-CM

## 2018-01-27 DIAGNOSIS — K29.70 GASTRITIS, UNSPECIFIED, WITHOUT BLEEDING: ICD-10-CM

## 2018-01-27 DIAGNOSIS — I10 ESSENTIAL (PRIMARY) HYPERTENSION: ICD-10-CM

## 2018-01-27 DIAGNOSIS — Z86.010 PERSONAL HISTORY OF COLONIC POLYPS: ICD-10-CM

## 2018-04-23 ENCOUNTER — RX RENEWAL (OUTPATIENT)
Age: 68
End: 2018-04-23

## 2018-06-06 ENCOUNTER — RX RENEWAL (OUTPATIENT)
Age: 68
End: 2018-06-06

## 2018-07-20 ENCOUNTER — EMERGENCY (EMERGENCY)
Facility: HOSPITAL | Age: 68
LOS: 0 days | Discharge: HOME | End: 2018-07-20
Attending: EMERGENCY MEDICINE | Admitting: EMERGENCY MEDICINE
Payer: COMMERCIAL

## 2018-07-20 VITALS
SYSTOLIC BLOOD PRESSURE: 133 MMHG | OXYGEN SATURATION: 98 % | HEART RATE: 94 BPM | RESPIRATION RATE: 24 BRPM | DIASTOLIC BLOOD PRESSURE: 86 MMHG

## 2018-07-20 VITALS — WEIGHT: 220.02 LBS | HEIGHT: 68 IN

## 2018-07-20 DIAGNOSIS — Z88.0 ALLERGY STATUS TO PENICILLIN: ICD-10-CM

## 2018-07-20 DIAGNOSIS — I48.91 UNSPECIFIED ATRIAL FIBRILLATION: ICD-10-CM

## 2018-07-20 DIAGNOSIS — R50.9 FEVER, UNSPECIFIED: ICD-10-CM

## 2018-07-20 DIAGNOSIS — R94.31 ABNORMAL ELECTROCARDIOGRAM [ECG] [EKG]: ICD-10-CM

## 2018-07-20 DIAGNOSIS — G62.9 POLYNEUROPATHY, UNSPECIFIED: ICD-10-CM

## 2018-07-20 DIAGNOSIS — I50.9 HEART FAILURE, UNSPECIFIED: ICD-10-CM

## 2018-07-20 DIAGNOSIS — I10 ESSENTIAL (PRIMARY) HYPERTENSION: ICD-10-CM

## 2018-07-20 DIAGNOSIS — I87.2 VENOUS INSUFFICIENCY (CHRONIC) (PERIPHERAL): ICD-10-CM

## 2018-07-20 DIAGNOSIS — Z79.84 LONG TERM (CURRENT) USE OF ORAL HYPOGLYCEMIC DRUGS: ICD-10-CM

## 2018-07-20 DIAGNOSIS — Z79.82 LONG TERM (CURRENT) USE OF ASPIRIN: ICD-10-CM

## 2018-07-20 DIAGNOSIS — Z96.652 PRESENCE OF LEFT ARTIFICIAL KNEE JOINT: ICD-10-CM

## 2018-07-20 DIAGNOSIS — Z98.890 OTHER SPECIFIED POSTPROCEDURAL STATES: Chronic | ICD-10-CM

## 2018-07-20 DIAGNOSIS — E04.1 NONTOXIC SINGLE THYROID NODULE: ICD-10-CM

## 2018-07-20 DIAGNOSIS — K95.09 OTHER COMPLICATIONS OF GASTRIC BAND PROCEDURE: Chronic | ICD-10-CM

## 2018-07-20 DIAGNOSIS — Z79.01 LONG TERM (CURRENT) USE OF ANTICOAGULANTS: ICD-10-CM

## 2018-07-20 DIAGNOSIS — E11.9 TYPE 2 DIABETES MELLITUS WITHOUT COMPLICATIONS: ICD-10-CM

## 2018-07-20 DIAGNOSIS — Z80.3 FAMILY HISTORY OF MALIGNANT NEOPLASM OF BREAST: ICD-10-CM

## 2018-07-20 DIAGNOSIS — E66.01 MORBID (SEVERE) OBESITY DUE TO EXCESS CALORIES: ICD-10-CM

## 2018-07-20 PROBLEM — R06.00 DYSPNEA, UNSPECIFIED: Chronic | Status: ACTIVE | Noted: 2017-02-01

## 2018-07-20 LAB
ALBUMIN SERPL ELPH-MCNC: 2.8 G/DL — LOW (ref 3.3–5)
ALP SERPL-CCNC: 73 U/L — SIGNIFICANT CHANGE UP (ref 40–120)
ALT FLD-CCNC: 23 U/L — SIGNIFICANT CHANGE UP (ref 12–78)
ANION GAP SERPL CALC-SCNC: 5 MMOL/L — SIGNIFICANT CHANGE UP (ref 5–17)
APPEARANCE UR: CLEAR — SIGNIFICANT CHANGE UP
APTT BLD: 40.9 SEC — HIGH (ref 27.5–37.4)
AST SERPL-CCNC: 10 U/L — LOW (ref 15–37)
BACTERIA # UR AUTO: ABNORMAL
BASOPHILS # BLD AUTO: 0.08 K/UL — SIGNIFICANT CHANGE UP (ref 0–0.2)
BASOPHILS NFR BLD AUTO: 0.7 % — SIGNIFICANT CHANGE UP (ref 0–2)
BILIRUB SERPL-MCNC: 0.5 MG/DL — SIGNIFICANT CHANGE UP (ref 0.2–1.2)
BILIRUB UR-MCNC: NEGATIVE — SIGNIFICANT CHANGE UP
BUN SERPL-MCNC: 10 MG/DL — SIGNIFICANT CHANGE UP (ref 7–23)
CALCIUM SERPL-MCNC: 8.6 MG/DL — SIGNIFICANT CHANGE UP (ref 8.5–10.1)
CHLORIDE SERPL-SCNC: 104 MMOL/L — SIGNIFICANT CHANGE UP (ref 96–108)
CK SERPL-CCNC: 58 U/L — SIGNIFICANT CHANGE UP (ref 26–192)
CO2 SERPL-SCNC: 27 MMOL/L — SIGNIFICANT CHANGE UP (ref 22–31)
COLOR SPEC: YELLOW — SIGNIFICANT CHANGE UP
CREAT SERPL-MCNC: 0.65 MG/DL — SIGNIFICANT CHANGE UP (ref 0.5–1.3)
DIFF PNL FLD: ABNORMAL
EOSINOPHIL # BLD AUTO: 0.28 K/UL — SIGNIFICANT CHANGE UP (ref 0–0.5)
EOSINOPHIL NFR BLD AUTO: 2.3 % — SIGNIFICANT CHANGE UP (ref 0–6)
EPI CELLS # UR: SIGNIFICANT CHANGE UP
GLUCOSE SERPL-MCNC: 130 MG/DL — HIGH (ref 70–99)
GLUCOSE UR QL: NEGATIVE MG/DL — SIGNIFICANT CHANGE UP
HCT VFR BLD CALC: 41.6 % — SIGNIFICANT CHANGE UP (ref 34.5–45)
HGB BLD-MCNC: 13.8 G/DL — SIGNIFICANT CHANGE UP (ref 11.5–15.5)
IMM GRANULOCYTES NFR BLD AUTO: 0.7 % — SIGNIFICANT CHANGE UP (ref 0–1.5)
INR BLD: 2.76 RATIO — HIGH (ref 0.88–1.16)
KETONES UR-MCNC: NEGATIVE — SIGNIFICANT CHANGE UP
LEUKOCYTE ESTERASE UR-ACNC: NEGATIVE — SIGNIFICANT CHANGE UP
LYMPHOCYTES # BLD AUTO: 1.01 K/UL — SIGNIFICANT CHANGE UP (ref 1–3.3)
LYMPHOCYTES # BLD AUTO: 8.3 % — LOW (ref 13–44)
MCHC RBC-ENTMCNC: 29.9 PG — SIGNIFICANT CHANGE UP (ref 27–34)
MCHC RBC-ENTMCNC: 33.2 GM/DL — SIGNIFICANT CHANGE UP (ref 32–36)
MCV RBC AUTO: 90.2 FL — SIGNIFICANT CHANGE UP (ref 80–100)
MONOCYTES # BLD AUTO: 0.72 K/UL — SIGNIFICANT CHANGE UP (ref 0–0.9)
MONOCYTES NFR BLD AUTO: 5.9 % — SIGNIFICANT CHANGE UP (ref 2–14)
NEUTROPHILS # BLD AUTO: 9.94 K/UL — HIGH (ref 1.8–7.4)
NEUTROPHILS NFR BLD AUTO: 82.1 % — HIGH (ref 43–77)
NITRITE UR-MCNC: NEGATIVE — SIGNIFICANT CHANGE UP
NRBC # BLD: 0 /100 WBCS — SIGNIFICANT CHANGE UP (ref 0–0)
NT-PROBNP SERPL-SCNC: 2292 PG/ML — HIGH (ref 0–125)
PH UR: 8 — SIGNIFICANT CHANGE UP (ref 5–8)
PLATELET # BLD AUTO: 276 K/UL — SIGNIFICANT CHANGE UP (ref 150–400)
POTASSIUM SERPL-MCNC: 3.5 MMOL/L — SIGNIFICANT CHANGE UP (ref 3.5–5.3)
POTASSIUM SERPL-SCNC: 3.5 MMOL/L — SIGNIFICANT CHANGE UP (ref 3.5–5.3)
PROT SERPL-MCNC: 7.1 GM/DL — SIGNIFICANT CHANGE UP (ref 6–8.3)
PROT UR-MCNC: NEGATIVE MG/DL — SIGNIFICANT CHANGE UP
PROTHROM AB SERPL-ACNC: 30.4 SEC — HIGH (ref 9.8–12.7)
RBC # BLD: 4.61 M/UL — SIGNIFICANT CHANGE UP (ref 3.8–5.2)
RBC # FLD: 17.3 % — HIGH (ref 10.3–14.5)
RBC CASTS # UR COMP ASSIST: NEGATIVE /HPF — SIGNIFICANT CHANGE UP (ref 0–4)
SODIUM SERPL-SCNC: 136 MMOL/L — SIGNIFICANT CHANGE UP (ref 135–145)
SP GR SPEC: 1.01 — SIGNIFICANT CHANGE UP (ref 1.01–1.02)
TROPONIN I SERPL-MCNC: <0.015 NG/ML — SIGNIFICANT CHANGE UP (ref 0.01–0.04)
UROBILINOGEN FLD QL: NEGATIVE MG/DL — SIGNIFICANT CHANGE UP
WBC # BLD: 12.11 K/UL — HIGH (ref 3.8–10.5)
WBC # FLD AUTO: 12.11 K/UL — HIGH (ref 3.8–10.5)
WBC UR QL: SIGNIFICANT CHANGE UP

## 2018-07-20 PROCEDURE — 99285 EMERGENCY DEPT VISIT HI MDM: CPT

## 2018-07-20 PROCEDURE — 71045 X-RAY EXAM CHEST 1 VIEW: CPT | Mod: 26

## 2018-07-20 PROCEDURE — 93010 ELECTROCARDIOGRAM REPORT: CPT

## 2018-07-20 RX ORDER — SODIUM CHLORIDE 9 MG/ML
3 INJECTION INTRAMUSCULAR; INTRAVENOUS; SUBCUTANEOUS ONCE
Qty: 0 | Refills: 0 | Status: COMPLETED | OUTPATIENT
Start: 2018-07-20 | End: 2018-07-20

## 2018-07-20 RX ORDER — SODIUM CHLORIDE 9 MG/ML
1000 INJECTION INTRAMUSCULAR; INTRAVENOUS; SUBCUTANEOUS ONCE
Qty: 0 | Refills: 0 | Status: COMPLETED | OUTPATIENT
Start: 2018-07-20 | End: 2018-07-20

## 2018-07-20 RX ORDER — ACETAMINOPHEN 500 MG
650 TABLET ORAL ONCE
Qty: 0 | Refills: 0 | Status: COMPLETED | OUTPATIENT
Start: 2018-07-20 | End: 2018-07-20

## 2018-07-20 RX ORDER — METOPROLOL TARTRATE 50 MG
100 TABLET ORAL DAILY
Qty: 0 | Refills: 0 | Status: DISCONTINUED | OUTPATIENT
Start: 2018-07-20 | End: 2018-07-20

## 2018-07-20 RX ADMIN — Medication 100 MILLIGRAM(S): at 12:52

## 2018-07-20 RX ADMIN — Medication 650 MILLIGRAM(S): at 15:09

## 2018-07-20 RX ADMIN — SODIUM CHLORIDE 3 MILLILITER(S): 9 INJECTION INTRAMUSCULAR; INTRAVENOUS; SUBCUTANEOUS at 12:30

## 2018-07-20 RX ADMIN — SODIUM CHLORIDE 500 MILLILITER(S): 9 INJECTION INTRAMUSCULAR; INTRAVENOUS; SUBCUTANEOUS at 12:30

## 2018-07-20 NOTE — ED PROVIDER NOTE - MEDICAL DECISION MAKING DETAILS
Will treat rapid AFib. Pt is afebrile having taken antipyretics 10 hours ago. No need to workup for sepsis.

## 2018-07-20 NOTE — ED PROVIDER NOTE - PROGRESS NOTE DETAILS
pt afebrile without antipyretics, urine clean,, chest xray unchanged since 1/18, hr 79 will d/c glynn Manning DO

## 2018-07-20 NOTE — ED PROVIDER NOTE - OBJECTIVE STATEMENT
67 y/o female with a PMHx of HTN on Diltiazem 120mg, DM on Metoprolol 100mg, AFib on Wafarin, digestive system complications on Uceris presents to the ED c/o fever since 2:00 this morning. Pt started she had the chills so she took her temperature and she had a 102.4 fever. She also states that her AFib was acting up. Pt states she didn't take her medication today.

## 2018-07-23 RX ORDER — NITROFURANTOIN MACROCRYSTAL 50 MG
1 CAPSULE ORAL
Qty: 14 | Refills: 0
Start: 2018-07-23 | End: 2018-07-29

## 2018-08-11 RX ORDER — CEPHALEXIN 500 MG
1 CAPSULE ORAL
Qty: 0 | Refills: 0 | DISCHARGE
Start: 2018-08-11 | End: 2018-08-17

## 2018-08-13 ENCOUNTER — INPATIENT (INPATIENT)
Facility: HOSPITAL | Age: 68
LOS: 8 days | Discharge: HOME HEALTH | End: 2018-08-22
Attending: INTERNAL MEDICINE | Admitting: INTERNAL MEDICINE
Payer: COMMERCIAL

## 2018-08-13 VITALS
DIASTOLIC BLOOD PRESSURE: 77 MMHG | SYSTOLIC BLOOD PRESSURE: 138 MMHG | WEIGHT: 274.92 LBS | RESPIRATION RATE: 19 BRPM | HEART RATE: 76 BPM | TEMPERATURE: 98 F | OXYGEN SATURATION: 99 %

## 2018-08-13 DIAGNOSIS — Z98.890 OTHER SPECIFIED POSTPROCEDURAL STATES: Chronic | ICD-10-CM

## 2018-08-13 DIAGNOSIS — K95.09 OTHER COMPLICATIONS OF GASTRIC BAND PROCEDURE: Chronic | ICD-10-CM

## 2018-08-13 LAB
ALBUMIN SERPL ELPH-MCNC: 2.5 G/DL — LOW (ref 3.3–5)
ALP SERPL-CCNC: 61 U/L — SIGNIFICANT CHANGE UP (ref 40–120)
ALT FLD-CCNC: 22 U/L — SIGNIFICANT CHANGE UP (ref 12–78)
ANION GAP SERPL CALC-SCNC: 10 MMOL/L — SIGNIFICANT CHANGE UP (ref 5–17)
APPEARANCE UR: CLEAR — SIGNIFICANT CHANGE UP
APTT BLD: 41.8 SEC — HIGH (ref 27.5–37.4)
AST SERPL-CCNC: 9 U/L — LOW (ref 15–37)
BACTERIA # UR AUTO: ABNORMAL
BASOPHILS # BLD AUTO: 0.08 K/UL — SIGNIFICANT CHANGE UP (ref 0–0.2)
BASOPHILS NFR BLD AUTO: 0.7 % — SIGNIFICANT CHANGE UP (ref 0–2)
BILIRUB SERPL-MCNC: 0.4 MG/DL — SIGNIFICANT CHANGE UP (ref 0.2–1.2)
BILIRUB UR-MCNC: NEGATIVE — SIGNIFICANT CHANGE UP
BUN SERPL-MCNC: 12 MG/DL — SIGNIFICANT CHANGE UP (ref 7–23)
CALCIUM SERPL-MCNC: 8.5 MG/DL — SIGNIFICANT CHANGE UP (ref 8.5–10.1)
CHLORIDE SERPL-SCNC: 108 MMOL/L — SIGNIFICANT CHANGE UP (ref 96–108)
CO2 SERPL-SCNC: 25 MMOL/L — SIGNIFICANT CHANGE UP (ref 22–31)
COLOR SPEC: YELLOW — SIGNIFICANT CHANGE UP
CREAT SERPL-MCNC: 0.55 MG/DL — SIGNIFICANT CHANGE UP (ref 0.5–1.3)
D DIMER BLD IA.RAPID-MCNC: <150 NG/ML DDU — SIGNIFICANT CHANGE UP
DIFF PNL FLD: NEGATIVE — SIGNIFICANT CHANGE UP
EOSINOPHIL # BLD AUTO: 0.1 K/UL — SIGNIFICANT CHANGE UP (ref 0–0.5)
EOSINOPHIL NFR BLD AUTO: 0.9 % — SIGNIFICANT CHANGE UP (ref 0–6)
EPI CELLS # UR: SIGNIFICANT CHANGE UP
GLUCOSE BLDC GLUCOMTR-MCNC: 141 MG/DL — HIGH (ref 70–99)
GLUCOSE BLDC GLUCOMTR-MCNC: 188 MG/DL — HIGH (ref 70–99)
GLUCOSE SERPL-MCNC: 100 MG/DL — HIGH (ref 70–99)
GLUCOSE UR QL: NEGATIVE MG/DL — SIGNIFICANT CHANGE UP
HCT VFR BLD CALC: 36.7 % — SIGNIFICANT CHANGE UP (ref 34.5–45)
HGB BLD-MCNC: 11.9 G/DL — SIGNIFICANT CHANGE UP (ref 11.5–15.5)
IMM GRANULOCYTES NFR BLD AUTO: 0.9 % — SIGNIFICANT CHANGE UP (ref 0–1.5)
INR BLD: 2.71 RATIO — HIGH (ref 0.88–1.16)
KETONES UR-MCNC: NEGATIVE — SIGNIFICANT CHANGE UP
LACTATE SERPL-SCNC: 2.1 MMOL/L — HIGH (ref 0.7–2)
LACTATE SERPL-SCNC: 2.7 MMOL/L — HIGH (ref 0.7–2)
LACTATE SERPL-SCNC: 2.8 MMOL/L — HIGH (ref 0.7–2)
LEUKOCYTE ESTERASE UR-ACNC: ABNORMAL
LYMPHOCYTES # BLD AUTO: 1.9 K/UL — SIGNIFICANT CHANGE UP (ref 1–3.3)
LYMPHOCYTES # BLD AUTO: 16.2 % — SIGNIFICANT CHANGE UP (ref 13–44)
MCHC RBC-ENTMCNC: 30.7 PG — SIGNIFICANT CHANGE UP (ref 27–34)
MCHC RBC-ENTMCNC: 32.4 GM/DL — SIGNIFICANT CHANGE UP (ref 32–36)
MCV RBC AUTO: 94.6 FL — SIGNIFICANT CHANGE UP (ref 80–100)
MONOCYTES # BLD AUTO: 0.89 K/UL — SIGNIFICANT CHANGE UP (ref 0–0.9)
MONOCYTES NFR BLD AUTO: 7.6 % — SIGNIFICANT CHANGE UP (ref 2–14)
NEUTROPHILS # BLD AUTO: 8.64 K/UL — HIGH (ref 1.8–7.4)
NEUTROPHILS NFR BLD AUTO: 73.7 % — SIGNIFICANT CHANGE UP (ref 43–77)
NITRITE UR-MCNC: NEGATIVE — SIGNIFICANT CHANGE UP
NRBC # BLD: 0 /100 WBCS — SIGNIFICANT CHANGE UP (ref 0–0)
PH UR: 5 — SIGNIFICANT CHANGE UP (ref 5–8)
PLATELET # BLD AUTO: 346 K/UL — SIGNIFICANT CHANGE UP (ref 150–400)
POTASSIUM SERPL-MCNC: 3.6 MMOL/L — SIGNIFICANT CHANGE UP (ref 3.5–5.3)
POTASSIUM SERPL-SCNC: 3.6 MMOL/L — SIGNIFICANT CHANGE UP (ref 3.5–5.3)
PROT SERPL-MCNC: 7.1 GM/DL — SIGNIFICANT CHANGE UP (ref 6–8.3)
PROT UR-MCNC: NEGATIVE MG/DL — SIGNIFICANT CHANGE UP
PROTHROM AB SERPL-ACNC: 29.9 SEC — HIGH (ref 9.8–12.7)
RBC # BLD: 3.88 M/UL — SIGNIFICANT CHANGE UP (ref 3.8–5.2)
RBC # FLD: 15.5 % — HIGH (ref 10.3–14.5)
RBC CASTS # UR COMP ASSIST: SIGNIFICANT CHANGE UP /HPF (ref 0–4)
SODIUM SERPL-SCNC: 143 MMOL/L — SIGNIFICANT CHANGE UP (ref 135–145)
SP GR SPEC: 1.02 — SIGNIFICANT CHANGE UP (ref 1.01–1.02)
UROBILINOGEN FLD QL: 1 MG/DL
WBC # BLD: 11.72 K/UL — HIGH (ref 3.8–10.5)
WBC # FLD AUTO: 11.72 K/UL — HIGH (ref 3.8–10.5)
WBC UR QL: SIGNIFICANT CHANGE UP

## 2018-08-13 PROCEDURE — 71045 X-RAY EXAM CHEST 1 VIEW: CPT | Mod: 26

## 2018-08-13 PROCEDURE — 99285 EMERGENCY DEPT VISIT HI MDM: CPT

## 2018-08-13 PROCEDURE — 93010 ELECTROCARDIOGRAM REPORT: CPT

## 2018-08-13 RX ORDER — OXYCODONE AND ACETAMINOPHEN 5; 325 MG/1; MG/1
1 TABLET ORAL ONCE
Qty: 0 | Refills: 0 | Status: DISCONTINUED | OUTPATIENT
Start: 2018-08-13 | End: 2018-08-13

## 2018-08-13 RX ORDER — SODIUM CHLORIDE 9 MG/ML
500 INJECTION INTRAMUSCULAR; INTRAVENOUS; SUBCUTANEOUS ONCE
Qty: 0 | Refills: 0 | Status: COMPLETED | OUTPATIENT
Start: 2018-08-13 | End: 2018-08-13

## 2018-08-13 RX ORDER — ACETAMINOPHEN 500 MG
650 TABLET ORAL EVERY 6 HOURS
Qty: 0 | Refills: 0 | Status: DISCONTINUED | OUTPATIENT
Start: 2018-08-13 | End: 2018-08-14

## 2018-08-13 RX ORDER — FAMOTIDINE 10 MG/ML
20 INJECTION INTRAVENOUS DAILY
Qty: 0 | Refills: 0 | Status: DISCONTINUED | OUTPATIENT
Start: 2018-08-13 | End: 2018-08-22

## 2018-08-13 RX ORDER — MONTELUKAST 4 MG/1
10 TABLET, CHEWABLE ORAL DAILY
Qty: 0 | Refills: 0 | Status: DISCONTINUED | OUTPATIENT
Start: 2018-08-13 | End: 2018-08-22

## 2018-08-13 RX ORDER — DEXTROSE 50 % IN WATER 50 %
12.5 SYRINGE (ML) INTRAVENOUS ONCE
Qty: 0 | Refills: 0 | Status: DISCONTINUED | OUTPATIENT
Start: 2018-08-13 | End: 2018-08-22

## 2018-08-13 RX ORDER — ATORVASTATIN CALCIUM 80 MG/1
10 TABLET, FILM COATED ORAL AT BEDTIME
Qty: 0 | Refills: 0 | Status: DISCONTINUED | OUTPATIENT
Start: 2018-08-13 | End: 2018-08-14

## 2018-08-13 RX ORDER — FUROSEMIDE 40 MG
40 TABLET ORAL DAILY
Qty: 0 | Refills: 0 | Status: DISCONTINUED | OUTPATIENT
Start: 2018-08-14 | End: 2018-08-18

## 2018-08-13 RX ORDER — VANCOMYCIN HCL 1 G
1000 VIAL (EA) INTRAVENOUS ONCE
Qty: 0 | Refills: 0 | Status: COMPLETED | OUTPATIENT
Start: 2018-08-13 | End: 2018-08-13

## 2018-08-13 RX ORDER — METOPROLOL TARTRATE 50 MG
100 TABLET ORAL DAILY
Qty: 0 | Refills: 0 | Status: DISCONTINUED | OUTPATIENT
Start: 2018-08-13 | End: 2018-08-22

## 2018-08-13 RX ORDER — POTASSIUM CHLORIDE 20 MEQ
20 PACKET (EA) ORAL DAILY
Qty: 0 | Refills: 0 | Status: DISCONTINUED | OUTPATIENT
Start: 2018-08-13 | End: 2018-08-22

## 2018-08-13 RX ORDER — DEXTROSE 50 % IN WATER 50 %
25 SYRINGE (ML) INTRAVENOUS ONCE
Qty: 0 | Refills: 0 | Status: DISCONTINUED | OUTPATIENT
Start: 2018-08-13 | End: 2018-08-22

## 2018-08-13 RX ORDER — GLUCAGON INJECTION, SOLUTION 0.5 MG/.1ML
1 INJECTION, SOLUTION SUBCUTANEOUS ONCE
Qty: 0 | Refills: 0 | Status: DISCONTINUED | OUTPATIENT
Start: 2018-08-13 | End: 2018-08-22

## 2018-08-13 RX ORDER — OXYCODONE AND ACETAMINOPHEN 5; 325 MG/1; MG/1
2 TABLET ORAL EVERY 6 HOURS
Qty: 0 | Refills: 0 | Status: DISCONTINUED | OUTPATIENT
Start: 2018-08-13 | End: 2018-08-14

## 2018-08-13 RX ORDER — INSULIN LISPRO 100/ML
VIAL (ML) SUBCUTANEOUS
Qty: 0 | Refills: 0 | Status: DISCONTINUED | OUTPATIENT
Start: 2018-08-13 | End: 2018-08-22

## 2018-08-13 RX ORDER — DEXTROSE 50 % IN WATER 50 %
15 SYRINGE (ML) INTRAVENOUS ONCE
Qty: 0 | Refills: 0 | Status: DISCONTINUED | OUTPATIENT
Start: 2018-08-13 | End: 2018-08-22

## 2018-08-13 RX ORDER — HYDROMORPHONE HYDROCHLORIDE 2 MG/ML
0.5 INJECTION INTRAMUSCULAR; INTRAVENOUS; SUBCUTANEOUS ONCE
Qty: 0 | Refills: 0 | Status: DISCONTINUED | OUTPATIENT
Start: 2018-08-13 | End: 2018-08-13

## 2018-08-13 RX ORDER — SODIUM CHLORIDE 9 MG/ML
1000 INJECTION, SOLUTION INTRAVENOUS
Qty: 0 | Refills: 0 | Status: DISCONTINUED | OUTPATIENT
Start: 2018-08-13 | End: 2018-08-22

## 2018-08-13 RX ORDER — ASPIRIN/CALCIUM CARB/MAGNESIUM 324 MG
81 TABLET ORAL DAILY
Qty: 0 | Refills: 0 | Status: DISCONTINUED | OUTPATIENT
Start: 2018-08-13 | End: 2018-08-22

## 2018-08-13 RX ORDER — VANCOMYCIN HCL 1 G
1500 VIAL (EA) INTRAVENOUS EVERY 12 HOURS
Qty: 0 | Refills: 0 | Status: DISCONTINUED | OUTPATIENT
Start: 2018-08-13 | End: 2018-08-15

## 2018-08-13 RX ORDER — FERROUS SULFATE 325(65) MG
325 TABLET ORAL DAILY
Qty: 0 | Refills: 0 | Status: DISCONTINUED | OUTPATIENT
Start: 2018-08-13 | End: 2018-08-22

## 2018-08-13 RX ORDER — VANCOMYCIN HCL 1 G
1000 VIAL (EA) INTRAVENOUS EVERY 12 HOURS
Qty: 0 | Refills: 0 | Status: DISCONTINUED | OUTPATIENT
Start: 2018-08-13 | End: 2018-08-13

## 2018-08-13 RX ORDER — FUROSEMIDE 40 MG
40 TABLET ORAL DAILY
Qty: 0 | Refills: 0 | Status: DISCONTINUED | OUTPATIENT
Start: 2018-08-13 | End: 2018-08-13

## 2018-08-13 RX ORDER — WARFARIN SODIUM 2.5 MG/1
5 TABLET ORAL ONCE
Qty: 0 | Refills: 0 | Status: COMPLETED | OUTPATIENT
Start: 2018-08-13 | End: 2018-08-13

## 2018-08-13 RX ORDER — OXYCODONE AND ACETAMINOPHEN 5; 325 MG/1; MG/1
1 TABLET ORAL EVERY 6 HOURS
Qty: 0 | Refills: 0 | Status: DISCONTINUED | OUTPATIENT
Start: 2018-08-13 | End: 2018-08-14

## 2018-08-13 RX ADMIN — OXYCODONE AND ACETAMINOPHEN 1 TABLET(S): 5; 325 TABLET ORAL at 14:43

## 2018-08-13 RX ADMIN — SODIUM CHLORIDE 500 MILLILITER(S): 9 INJECTION INTRAMUSCULAR; INTRAVENOUS; SUBCUTANEOUS at 15:44

## 2018-08-13 RX ADMIN — ATORVASTATIN CALCIUM 10 MILLIGRAM(S): 80 TABLET, FILM COATED ORAL at 22:21

## 2018-08-13 RX ADMIN — SODIUM CHLORIDE 1000 MILLILITER(S): 9 INJECTION INTRAMUSCULAR; INTRAVENOUS; SUBCUTANEOUS at 23:29

## 2018-08-13 RX ADMIN — WARFARIN SODIUM 5 MILLIGRAM(S): 2.5 TABLET ORAL at 22:12

## 2018-08-13 RX ADMIN — HYDROMORPHONE HYDROCHLORIDE 0.5 MILLIGRAM(S): 2 INJECTION INTRAMUSCULAR; INTRAVENOUS; SUBCUTANEOUS at 22:12

## 2018-08-13 RX ADMIN — Medication 1: at 22:23

## 2018-08-13 RX ADMIN — SODIUM CHLORIDE 500 MILLILITER(S): 9 INJECTION INTRAMUSCULAR; INTRAVENOUS; SUBCUTANEOUS at 14:43

## 2018-08-13 RX ADMIN — FAMOTIDINE 20 MILLIGRAM(S): 10 INJECTION INTRAVENOUS at 19:48

## 2018-08-13 RX ADMIN — SODIUM CHLORIDE 500 MILLILITER(S): 9 INJECTION INTRAMUSCULAR; INTRAVENOUS; SUBCUTANEOUS at 17:30

## 2018-08-13 RX ADMIN — Medication 250 MILLIGRAM(S): at 13:41

## 2018-08-13 RX ADMIN — HYDROMORPHONE HYDROCHLORIDE 0.5 MILLIGRAM(S): 2 INJECTION INTRAMUSCULAR; INTRAVENOUS; SUBCUTANEOUS at 23:16

## 2018-08-13 RX ADMIN — Medication 1000 MILLIGRAM(S): at 14:42

## 2018-08-13 RX ADMIN — OXYCODONE AND ACETAMINOPHEN 1 TABLET(S): 5; 325 TABLET ORAL at 15:34

## 2018-08-13 NOTE — ED PROVIDER NOTE - PROGRESS NOTE DETAILS
30cc/kg bolus not given to patient upon arrival as she did not meet sepsis criteria and because of history of CHF.  Patient lactate has slightly increased likely secondary to infection, another 500cc bolus to be given as patient is not clinically in CHF at this time -Kym Abbott PA-C

## 2018-08-13 NOTE — ED ADULT TRIAGE NOTE - CHIEF COMPLAINT QUOTE
pt states right leg swelling pain and discharge for the past 2 weeks now w/ burning and diff ambulating.

## 2018-08-13 NOTE — H&P ADULT - HISTORY OF PRESENT ILLNESS
67 y/o F PMHx Afib (on coumadin), CHF (unknown EF), DM, ulcerative colitis, HTN, PVD presents to ED sent in by vascular due to worsening B/L LE cellulitis and edema x2 weeks. Patient states she had been wearing russ boots to help with her LE edema and they were changed weekly. Patient states last week when boots were being changed, she had noticed increased redness on her RLE, but the boots were placed anyway. Patient began to experience pain and when boots were taken off, she noticed the RLE was severely discolored. Patient was placed on outpatient antibiotics by Dr. Rivera on Saturday, but her legs did not improve much and when she saw Dr. Rivera in office today, she was sent to ED for IV antibiotics. Patient denied any fevers, CP, palpitations, abd pain, N/V/D/C. Patient admitted to chills, sweats, SOB (not changed from baseline).    In ED, patient had leukocytosis, elevated lactate- 2.7 after NS bolus 500cc x2. CXR done showing no acute pathology.

## 2018-08-13 NOTE — PATIENT PROFILE ADULT. - NS TRANSFER PATIENT BELONGINGS
Wrist Watch/Clothing/Electronic Device (specify)/Jewelry/Money (specify)/gold hoop earrings, gold stud earrings, gold sundeep necklace, purse, wallet, cane,LG flip phone,

## 2018-08-13 NOTE — H&P ADULT - ASSESSMENT
67 y/o F PMHx Afib (on coumadin), CHF (unknown EF), DM, ulcerative colitis, HTN, PVD presents to ED sent in by vascular due to worsening B/L LE cellulitis and edema x2 weeks being admitted for cellulitis    #Cellulitis  -Continue IV antibiotics  -ID consulted, appreciate recommendations  -F/U blood cx, urine cx  -F/U repeat lactate  -F/U Am labs  -Pain management    #Afib  -Continue coumadin  -Continue cardizem and metoprolol  -Monitor BP  -F/U PT/INR in AM    #SOB  #CHF  -SOB likely 2/2 CHF vs. morbid obesity  -F/U D-dimer- DVT unlikely based on Well's criteria and patient already therapeutically anticoagulated  -Lasix IV  -Daily weights  -Strict Is and Os    #DM  -FS AC and HS  -Low dose ISS  -F/U HgbA1c in AM    #Ulcerative colitis  -Continue home medications    #HTN  -Continue home medications with hold parameters  -Monitor BP    #DVT ppx  -On coumadin for Afib- therapeutic INR    Total time spent: 85 minutes

## 2018-08-13 NOTE — H&P ADULT - RS GEN PE MLT RESP DETAILS PC
respirations non-labored/no wheezes/no chest wall tenderness/no rhonchi/no rales/clear to auscultation bilaterally/no intercostal retractions

## 2018-08-13 NOTE — ED STATDOCS - PROGRESS NOTE DETAILS
Anselmo Cook for attending Dr. Cruz: 69 y/o female with a PMHx of DM, CHF, cellulitis, HTN, Afib (Warfarin) presents to the ED c/o lower leg cellulitis. Former smoker. Will send pt to main ED for further evaluation.

## 2018-08-13 NOTE — ED PROVIDER NOTE - OBJECTIVE STATEMENT
69 yo F with PMHx of CHF, afib, HTN, DM, PVD, presents to ED reporting worsening of b/l LE cellulitis and edema over past week.  She took 2 days of outpatient antibiotics, but cannot recall what the medication was.  The patient has been wearing an russ boot that was changed weekly so she is not sure exactly when the infection began but knows that the pain has been worsening and there was erythema and worsening swelling a couple of days ago when she saw her PMD.  She denies fever and chills but does admit to some SOB.

## 2018-08-13 NOTE — ED ADULT NURSE NOTE - NSIMPLEMENTINTERV_GEN_ALL_ED
Implemented All Fall with Harm Risk Interventions:  [place] to call system. Call bell, personal items and telephone within reach. Instruct patient to call for assistance. Room bathroom lighting operational. Non-slip footwear when patient is off stretcher. Physically safe environment: no spills, clutter or unnecessary equipment. Stretcher in lowest position, wheels locked, appropriate side rails in place. Provide visual cue, wrist band, yellow gown, etc. Monitor gait and stability. Monitor for mental status changes and reorient to person, place, and time. Review medications for side effects contributing to fall risk. Reinforce activity limits and safety measures with patient and family. Provide visual clues: red socks.

## 2018-08-13 NOTE — ED ADULT NURSE REASSESSMENT NOTE - NS ED NURSE REASSESS COMMENT FT1
spoke with MD danielle regarding to lasix medication, will hold med, wait for repeat lactate.  md danielle downgrading pt to med/sx.  report given to 5E, awaiting for bedclean.

## 2018-08-13 NOTE — ED PROVIDER NOTE - SKIN COLOR
normal for race/bilateral lower extremities with weeping cellulitis.  RLE worse than left with erythema and induration extending from below knee to above ankle

## 2018-08-14 LAB
ALBUMIN SERPL ELPH-MCNC: 2.6 G/DL — LOW (ref 3.3–5)
ALP SERPL-CCNC: 190 U/L — HIGH (ref 40–120)
ALT FLD-CCNC: 237 U/L — HIGH (ref 12–78)
ANION GAP SERPL CALC-SCNC: 10 MMOL/L — SIGNIFICANT CHANGE UP (ref 5–17)
AST SERPL-CCNC: 218 U/L — HIGH (ref 15–37)
BASOPHILS # BLD AUTO: 0.05 K/UL — SIGNIFICANT CHANGE UP (ref 0–0.2)
BASOPHILS NFR BLD AUTO: 0.5 % — SIGNIFICANT CHANGE UP (ref 0–2)
BILIRUB SERPL-MCNC: 1 MG/DL — SIGNIFICANT CHANGE UP (ref 0.2–1.2)
BUN SERPL-MCNC: 9 MG/DL — SIGNIFICANT CHANGE UP (ref 7–23)
CALCIUM SERPL-MCNC: 8.2 MG/DL — LOW (ref 8.5–10.1)
CHLORIDE SERPL-SCNC: 103 MMOL/L — SIGNIFICANT CHANGE UP (ref 96–108)
CO2 SERPL-SCNC: 25 MMOL/L — SIGNIFICANT CHANGE UP (ref 22–31)
CREAT SERPL-MCNC: 0.59 MG/DL — SIGNIFICANT CHANGE UP (ref 0.5–1.3)
CULTURE RESULTS: NO GROWTH — SIGNIFICANT CHANGE UP
EOSINOPHIL # BLD AUTO: 0.06 K/UL — SIGNIFICANT CHANGE UP (ref 0–0.5)
EOSINOPHIL NFR BLD AUTO: 0.6 % — SIGNIFICANT CHANGE UP (ref 0–6)
GLUCOSE BLDC GLUCOMTR-MCNC: 112 MG/DL — HIGH (ref 70–99)
GLUCOSE BLDC GLUCOMTR-MCNC: 149 MG/DL — HIGH (ref 70–99)
GLUCOSE BLDC GLUCOMTR-MCNC: 161 MG/DL — HIGH (ref 70–99)
GLUCOSE BLDC GLUCOMTR-MCNC: 189 MG/DL — HIGH (ref 70–99)
GLUCOSE SERPL-MCNC: 167 MG/DL — HIGH (ref 70–99)
HBA1C BLD-MCNC: 6.3 % — HIGH (ref 4–5.6)
HCT VFR BLD CALC: 37.9 % — SIGNIFICANT CHANGE UP (ref 34.5–45)
HGB BLD-MCNC: 12.2 G/DL — SIGNIFICANT CHANGE UP (ref 11.5–15.5)
IMM GRANULOCYTES NFR BLD AUTO: 0.8 % — SIGNIFICANT CHANGE UP (ref 0–1.5)
INR BLD: 2.69 RATIO — HIGH (ref 0.88–1.16)
LACTATE SERPL-SCNC: 1.7 MMOL/L — SIGNIFICANT CHANGE UP (ref 0.7–2)
LACTATE SERPL-SCNC: 2.2 MMOL/L — HIGH (ref 0.7–2)
LYMPHOCYTES # BLD AUTO: 1.29 K/UL — SIGNIFICANT CHANGE UP (ref 1–3.3)
LYMPHOCYTES # BLD AUTO: 12.9 % — LOW (ref 13–44)
MAGNESIUM SERPL-MCNC: 1.6 MG/DL — SIGNIFICANT CHANGE UP (ref 1.6–2.6)
MCHC RBC-ENTMCNC: 30.4 PG — SIGNIFICANT CHANGE UP (ref 27–34)
MCHC RBC-ENTMCNC: 32.2 GM/DL — SIGNIFICANT CHANGE UP (ref 32–36)
MCV RBC AUTO: 94.5 FL — SIGNIFICANT CHANGE UP (ref 80–100)
MONOCYTES # BLD AUTO: 0.53 K/UL — SIGNIFICANT CHANGE UP (ref 0–0.9)
MONOCYTES NFR BLD AUTO: 5.3 % — SIGNIFICANT CHANGE UP (ref 2–14)
NEUTROPHILS # BLD AUTO: 7.97 K/UL — HIGH (ref 1.8–7.4)
NEUTROPHILS NFR BLD AUTO: 79.9 % — HIGH (ref 43–77)
NRBC # BLD: 0 /100 WBCS — SIGNIFICANT CHANGE UP (ref 0–0)
PHOSPHATE SERPL-MCNC: 3.2 MG/DL — SIGNIFICANT CHANGE UP (ref 2.5–4.5)
PLATELET # BLD AUTO: 366 K/UL — SIGNIFICANT CHANGE UP (ref 150–400)
POTASSIUM SERPL-MCNC: 3.7 MMOL/L — SIGNIFICANT CHANGE UP (ref 3.5–5.3)
POTASSIUM SERPL-SCNC: 3.7 MMOL/L — SIGNIFICANT CHANGE UP (ref 3.5–5.3)
PROT SERPL-MCNC: 7.5 GM/DL — SIGNIFICANT CHANGE UP (ref 6–8.3)
PROTHROM AB SERPL-ACNC: 29.6 SEC — HIGH (ref 9.8–12.7)
RBC # BLD: 4.01 M/UL — SIGNIFICANT CHANGE UP (ref 3.8–5.2)
RBC # FLD: 15.4 % — HIGH (ref 10.3–14.5)
SODIUM SERPL-SCNC: 138 MMOL/L — SIGNIFICANT CHANGE UP (ref 135–145)
SPECIMEN SOURCE: SIGNIFICANT CHANGE UP
WBC # BLD: 9.98 K/UL — SIGNIFICANT CHANGE UP (ref 3.8–10.5)
WBC # FLD AUTO: 9.98 K/UL — SIGNIFICANT CHANGE UP (ref 3.8–10.5)

## 2018-08-14 RX ORDER — OXYCODONE HYDROCHLORIDE 5 MG/1
5 TABLET ORAL EVERY 6 HOURS
Qty: 0 | Refills: 0 | Status: DISCONTINUED | OUTPATIENT
Start: 2018-08-14 | End: 2018-08-17

## 2018-08-14 RX ORDER — CEFEPIME 1 G/1
INJECTION, POWDER, FOR SOLUTION INTRAMUSCULAR; INTRAVENOUS
Qty: 0 | Refills: 0 | Status: DISCONTINUED | OUTPATIENT
Start: 2018-08-14 | End: 2018-08-22

## 2018-08-14 RX ORDER — OXYCODONE HYDROCHLORIDE 5 MG/1
10 TABLET ORAL EVERY 6 HOURS
Qty: 0 | Refills: 0 | Status: DISCONTINUED | OUTPATIENT
Start: 2018-08-14 | End: 2018-08-17

## 2018-08-14 RX ORDER — LIDOCAINE 4 G/100G
1 CREAM TOPICAL ONCE
Qty: 0 | Refills: 0 | Status: COMPLETED | OUTPATIENT
Start: 2018-08-14 | End: 2018-08-14

## 2018-08-14 RX ORDER — BUDESONIDE, MICRONIZED 100 %
9 POWDER (GRAM) MISCELLANEOUS DAILY
Qty: 0 | Refills: 0 | Status: DISCONTINUED | OUTPATIENT
Start: 2018-08-14 | End: 2018-08-14

## 2018-08-14 RX ORDER — CEFEPIME 1 G/1
2000 INJECTION, POWDER, FOR SOLUTION INTRAMUSCULAR; INTRAVENOUS ONCE
Qty: 0 | Refills: 0 | Status: COMPLETED | OUTPATIENT
Start: 2018-08-14 | End: 2018-08-14

## 2018-08-14 RX ORDER — IBUPROFEN 200 MG
400 TABLET ORAL EVERY 6 HOURS
Qty: 0 | Refills: 0 | Status: DISCONTINUED | OUTPATIENT
Start: 2018-08-14 | End: 2018-08-22

## 2018-08-14 RX ORDER — ONDANSETRON 8 MG/1
4 TABLET, FILM COATED ORAL EVERY 6 HOURS
Qty: 0 | Refills: 0 | Status: DISCONTINUED | OUTPATIENT
Start: 2018-08-14 | End: 2018-08-22

## 2018-08-14 RX ORDER — CEFEPIME 1 G/1
2000 INJECTION, POWDER, FOR SOLUTION INTRAMUSCULAR; INTRAVENOUS EVERY 12 HOURS
Qty: 0 | Refills: 0 | Status: DISCONTINUED | OUTPATIENT
Start: 2018-08-15 | End: 2018-08-22

## 2018-08-14 RX ORDER — WARFARIN SODIUM 2.5 MG/1
7.5 TABLET ORAL ONCE
Qty: 0 | Refills: 0 | Status: COMPLETED | OUTPATIENT
Start: 2018-08-14 | End: 2018-08-14

## 2018-08-14 RX ADMIN — Medication 325 MILLIGRAM(S): at 11:56

## 2018-08-14 RX ADMIN — ONDANSETRON 4 MILLIGRAM(S): 8 TABLET, FILM COATED ORAL at 09:24

## 2018-08-14 RX ADMIN — LIDOCAINE 1 APPLICATION(S): 4 CREAM TOPICAL at 21:31

## 2018-08-14 RX ADMIN — OXYCODONE AND ACETAMINOPHEN 2 TABLET(S): 5; 325 TABLET ORAL at 03:47

## 2018-08-14 RX ADMIN — Medication 300 MILLIGRAM(S): at 17:49

## 2018-08-14 RX ADMIN — Medication 1: at 08:08

## 2018-08-14 RX ADMIN — CEFEPIME 100 MILLIGRAM(S): 1 INJECTION, POWDER, FOR SOLUTION INTRAMUSCULAR; INTRAVENOUS at 13:24

## 2018-08-14 RX ADMIN — OXYCODONE HYDROCHLORIDE 5 MILLIGRAM(S): 5 TABLET ORAL at 18:05

## 2018-08-14 RX ADMIN — WARFARIN SODIUM 7.5 MILLIGRAM(S): 2.5 TABLET ORAL at 21:31

## 2018-08-14 RX ADMIN — Medication 300 MILLIGRAM(S): at 05:24

## 2018-08-14 RX ADMIN — Medication 40 MILLIGRAM(S): at 05:24

## 2018-08-14 RX ADMIN — MONTELUKAST 10 MILLIGRAM(S): 4 TABLET, CHEWABLE ORAL at 21:31

## 2018-08-14 RX ADMIN — Medication 100 MILLIGRAM(S): at 05:24

## 2018-08-14 RX ADMIN — Medication 20 MILLIEQUIVALENT(S): at 05:38

## 2018-08-14 RX ADMIN — Medication 10 MILLIGRAM(S): at 08:08

## 2018-08-14 RX ADMIN — Medication 81 MILLIGRAM(S): at 11:56

## 2018-08-14 RX ADMIN — FAMOTIDINE 20 MILLIGRAM(S): 10 INJECTION INTRAVENOUS at 11:56

## 2018-08-14 RX ADMIN — OXYCODONE HYDROCHLORIDE 5 MILLIGRAM(S): 5 TABLET ORAL at 17:49

## 2018-08-14 RX ADMIN — Medication 1: at 17:45

## 2018-08-14 NOTE — CONSULT NOTE ADULT - SUBJECTIVE AND OBJECTIVE BOX
Patient is a 68y old  Female who presents with a chief complaint of Worsening lower extremity redness and drainage (13 Aug 2018 22:26)    HPI:  69 y/o female with h/o A.fib on coumadin, CHF (unknown EF), DM, ulcerative colitis, HTN, PVD was admitted on  after she was sent in by vascular due to worsening B/L LE cellulitis and edema x 2 weeks. Patient states she had been wearing russ boots to help with her LE edema and they were changed weekly. Patient states last week when boots were being changed, she had noticed increased redness on her RLE, but the boots were placed anyway. Patient began to experience pain and when boots were taken off, she noticed the RLE was severely discolored. Patient was placed on outpatient antibiotics by Dr. Rivera 3 days PTA, but her legs did not improve much and when she saw Dr. Rivera in office, she was sent to ED for IV antibiotics. Patient denied any fever or chills. In ER, she received vancomycin IV.        PMH: as above  PSH: as above  Meds: per reconciliation sheet, noted below  MEDICATIONS  (STANDING):  aspirin enteric coated 81 milliGRAM(s) Oral daily  atorvastatin 10 milliGRAM(s) Oral at bedtime  budesonide  ER  tablet (Uceris) 9 milliGRAM(s) 9 milliGRAM(s) Oral <User Schedule>  dextrose 5%. 1000 milliLiter(s) (50 mL/Hr) IV Continuous <Continuous>  dextrose 50% Injectable 12.5 Gram(s) IV Push once  dextrose 50% Injectable 25 Gram(s) IV Push once  dextrose 50% Injectable 25 Gram(s) IV Push once  dicyclomine 10 milliGRAM(s) Oral daily  diltiazem    Tablet 120 milliGRAM(s) Oral two times a day  famotidine    Tablet 20 milliGRAM(s) Oral daily  ferrous    sulfate 325 milliGRAM(s) Oral daily  furosemide   Injectable 40 milliGRAM(s) IV Push daily  insulin lispro (HumaLOG) corrective regimen sliding scale   SubCutaneous Before meals and at bedtime  metoprolol succinate  milliGRAM(s) Oral daily  montelukast 10 milliGRAM(s) Oral daily  potassium chloride    Tablet ER 20 milliEquivalent(s) Oral daily  vancomycin  IVPB 1500 milliGRAM(s) IV Intermittent every 12 hours    MEDICATIONS  (PRN):  acetaminophen   Tablet 650 milliGRAM(s) Oral every 6 hours PRN For Temp greater than 38 C (100.4 F)  acetaminophen   Tablet. 650 milliGRAM(s) Oral every 6 hours PRN Mild Pain (1 - 3)  dextrose 40% Gel 15 Gram(s) Oral once PRN Blood Glucose LESS THAN 70 milliGRAM(s)/deciliter  glucagon  Injectable 1 milliGRAM(s) IntraMuscular once PRN Glucose LESS THAN 70 milligrams/deciliter  ondansetron Injectable 4 milliGRAM(s) IV Push every 6 hours PRN Nausea and/or Vomiting  oxyCODONE    5 mG/acetaminophen 325 mG 2 Tablet(s) Oral every 6 hours PRN Severe Pain (7 - 10)  oxyCODONE    5 mG/acetaminophen 325 mG 1 Tablet(s) Oral every 6 hours PRN Moderate Pain (4 - 6)    Allergies    penicillin (Hives)    Intolerances      Social: no smoking, no alcohol, no illegal drugs; no recent travel, no exposure to TB  FAMILY HISTORY:  Family history of breast cancer in mother (Mother)    no history of premature cardiovascular disease in first degree relatives    ROS: the patient denies fever, no chills, no HA, no seizures, no dizziness, no sore throat, no nasal congestion, no blurry vision, no CP, no palpitations, no SOB, no cough, no abdominal pain, no diarrhea, no N/V, no dysuria, has right leg pain, no claudication, has right leg rash, no joint aches, no rectal pain or bleeding, no night sweats  All other systems reviewed and are negative    Vital Signs Last 24 Hrs  T(C): 36.8 (14 Aug 2018 11:25), Max: 36.8 (14 Aug 2018 11:25)  T(F): 98.2 (14 Aug 2018 11:25), Max: 98.2 (14 Aug 2018 11:25)  HR: 63 (14 Aug 2018 11:25) (63 - 93)  BP: 118/70 (14 Aug 2018 11:25) (118/70 - 165/82)  BP(mean): --  RR: 16 (14 Aug 2018 11:25) (16 - 20)  SpO2: 97% (14 Aug 2018 11:25) (95% - 97%)  Daily     Daily Weight in k (13 Aug 2018 23:00)    PE:    Constitutional: frail looking  HEENT: NC/AT, EOMI, PERRLA, conjunctivae clear; ears and nose atraumatic; pharynx benign  Neck: supple; thyroid not palpable  Back: no tenderness  Respiratory: respiratory effort normal; clear to auscultation  Cardiovascular: S1S2 regular, no murmurs  Abdomen: soft, not tender, not distended, positive BS; no liver or spleen organomegaly  Genitourinary: no suprapubic tenderness  Lymphatic: no LN palpable  Musculoskeletal: no muscle tenderness, no joint swelling or tenderness  Extremities: b/l legs erythema and edema; right leg worse  Neurological/ Psychiatric: AxOx3, judgement and insight normal; moving all extremities  Skin: no rashes; no palpable lesions    Labs: all available labs reviewed                        12.2   9.98  )-----------( 366      ( 14 Aug 2018 07:33 )             37.9     08-14    138  |  103  |  9   ----------------------------<  167<H>  3.7   |  25  |  0.59    Ca    8.2<L>      14 Aug 2018 07:33  Phos  3.2     08-14  Mg     1.6     08-14    TPro  7.5  /  Alb  2.6<L>  /  TBili  1.0  /  DBili  x   /  AST  218<H>  /  ALT  237<H>  /  AlkPhos  190<H>  08-14     LIVER FUNCTIONS - ( 14 Aug 2018 07:33 )  Alb: 2.6 g/dL / Pro: 7.5 gm/dL / ALK PHOS: 190 U/L / ALT: 237 U/L / AST: 218 U/L / GGT: x           Urinalysis Basic - ( 13 Aug 2018 13:27 )    Color: Yellow / Appearance: Clear / S.020 / pH: x  Gluc: x / Ketone: Negative  / Bili: Negative / Urobili: 1 mg/dL   Blood: x / Protein: Negative mg/dL / Nitrite: Negative   Leuk Esterase: Trace / RBC: 0-2 /HPF / WBC 0-2   Sq Epi: x / Non Sq Epi: Occasional / Bacteria: Occasional          Radiology: all available radiological tests reviewed    Advanced directives addressed: full resuscitation

## 2018-08-14 NOTE — CONSULT NOTE ADULT - ASSESSMENT
69 y/o female with h/o A.fib on coumadin, CHF (unknown EF), DM, ulcerative colitis, HTN, PVD was admitted on 8/13 after she was sent in by vascular due to worsening B/L LE cellulitis and edema x 2 weeks. Patient states she had been wearing russ boots to help with her LE edema and they were changed weekly. Patient states last week when boots were being changed, she had noticed increased redness on her RLE, but the boots were placed anyway. Patient began to experience pain and when boots were taken off, she noticed the RLE was severely discolored. Patient was placed on outpatient antibiotics by Dr. Rivera 3 days PTA, but her legs did not improve much and when she saw Dr. Rivera in office, she was sent to ED for IV antibiotics. Patient denied any fever or chills. In ER, she received vancomycin IV.    1. B/l lower extremities chronic stasis dermatitis with superimposed RLE cellulitis. PVD.   -obtain BC x 2  -start vancomycin 1.5 gm IV q12h and cefepime 2 gm IV q12h  -reason for abx use and side effects reviewed with patient; monitor BMP and vancomycin trough levels   -elevate legs  -old chart reviewed to assess prior cultures  -monitor temps  -f/u CBC  -supportive care  2. Other issues:   -care per medicine 67 y/o female with h/o A.fib on coumadin, CHF (unknown EF), DM, ulcerative colitis, HTN, PVD was admitted on 8/13 after she was sent in by vascular due to worsening B/L LE cellulitis and edema x 2 weeks. Patient states she had been wearing russ boots to help with her LE edema and they were changed weekly. Patient states last week when boots were being changed, she had noticed increased redness on her RLE, but the boots were placed anyway. Patient began to experience pain and when boots were taken off, she noticed the RLE was severely discolored. Patient was placed on outpatient antibiotics by Dr. Rivera 3 days PTA, but her legs did not improve much and when she saw Dr. Rivera in office, she was sent to ED for IV antibiotics. Patient denied any fever or chills. In ER, she received vancomycin IV.    1. B/l lower extremities chronic stasis dermatitis with superimposed RLE cellulitis. PVD. Allergy to PCN.   -obtain BC x 2  -start vancomycin 1.5 gm IV q12h and cefepime 2 gm IV q12h  -reason for abx use and side effects reviewed with patient; monitor BMP and vancomycin trough levels   -monitor closely in ej of PCN allergy history  -elevate legs  -old chart reviewed to assess prior cultures  -monitor temps  -f/u CBC  -supportive care  2. Other issues:   -care per medicine

## 2018-08-14 NOTE — PROGRESS NOTE ADULT - SUBJECTIVE AND OBJECTIVE BOX
INTERVAL HPI/OVERNIGHT EVENTS:  67 y/o F PMHx Afib (on coumadin), CHF (unknown EF), DM, ulcerative colitis, HTN, PVD presents to ED sent in by vascular due to worsening B/L LE cellulitis and edema x2 weeks. Patient states she had been wearing russ boots to help with her LE edema and they were changed weekly. Patient states last week when boots were being changed, she had noticed increased redness on her RLE, but the boots were placed anyway. Patient began to experience pain and when boots were taken off, she noticed the RLE was severely discolored. Patient was placed on outpatient antibiotics by Dr. Rivera on Saturday, but her legs did not improve much and when she saw Dr. Rivera in office today, she was sent to ED for IV antibiotics. Patient denied any fevers, CP, palpitations, abd pain, N/V/D/C. Patient admitted to chills, sweats, SOB (not changed from baseline).    In ED, patient had leukocytosis, elevated lactate- 2.7 after NS bolus 500cc x2. CXR done showing no acute pathology.    18: Patient seen and examined at bedside. Complains of shooting pain in her R leg where the blisters are located, otherwise feels well. States SOB has improved, denies any CP, palpitations, fevers, chills.    MEDICATIONS  (STANDING):  aspirin enteric coated 81 milliGRAM(s) Oral daily  atorvastatin 10 milliGRAM(s) Oral at bedtime  budesonide  ER  tablet (Uceris) 9 milliGRAM(s) 9 milliGRAM(s) Oral <User Schedule>  cefepime   IVPB      dextrose 5%. 1000 milliLiter(s) (50 mL/Hr) IV Continuous <Continuous>  dextrose 50% Injectable 12.5 Gram(s) IV Push once  dextrose 50% Injectable 25 Gram(s) IV Push once  dextrose 50% Injectable 25 Gram(s) IV Push once  dicyclomine 10 milliGRAM(s) Oral daily  diltiazem    Tablet 120 milliGRAM(s) Oral two times a day  famotidine    Tablet 20 milliGRAM(s) Oral daily  ferrous    sulfate 325 milliGRAM(s) Oral daily  furosemide   Injectable 40 milliGRAM(s) IV Push daily  insulin lispro (HumaLOG) corrective regimen sliding scale   SubCutaneous Before meals and at bedtime  metoprolol succinate  milliGRAM(s) Oral daily  montelukast 10 milliGRAM(s) Oral daily  potassium chloride    Tablet ER 20 milliEquivalent(s) Oral daily  vancomycin  IVPB 1500 milliGRAM(s) IV Intermittent every 12 hours    MEDICATIONS  (PRN):  acetaminophen   Tablet 650 milliGRAM(s) Oral every 6 hours PRN For Temp greater than 38 C (100.4 F)  acetaminophen   Tablet. 650 milliGRAM(s) Oral every 6 hours PRN Mild Pain (1 - 3)  dextrose 40% Gel 15 Gram(s) Oral once PRN Blood Glucose LESS THAN 70 milliGRAM(s)/deciliter  glucagon  Injectable 1 milliGRAM(s) IntraMuscular once PRN Glucose LESS THAN 70 milligrams/deciliter  ondansetron Injectable 4 milliGRAM(s) IV Push every 6 hours PRN Nausea and/or Vomiting  oxyCODONE    5 mG/acetaminophen 325 mG 2 Tablet(s) Oral every 6 hours PRN Severe Pain (7 - 10)  oxyCODONE    5 mG/acetaminophen 325 mG 1 Tablet(s) Oral every 6 hours PRN Moderate Pain (4 - 6)      Allergies    penicillin (Hives)    Intolerances      ROS:  CONSTITUTIONAL: No weakness, fevers or chills  EYES/ENT: No visual changes;  No vertigo or throat pain   NECK: No pain or stiffness  RESPIRATORY: No cough, wheezing, hemoptysis; No shortness of breath  CARDIOVASCULAR: No chest pain or palpitations  GASTROINTESTINAL: No abdominal or epigastric pain. No nausea, vomiting, or hematemesis; No diarrhea or constipation.  GENITOURINARY: No dysuria, frequency or hematuria  NEUROLOGICAL: No numbness or weakness  SKIN: + cellulitis RLE  MSK: + R leg pain   All other review of systems is negative unless indicated above.    Vital Signs Last 24 Hrs  T(C): 36.8 (14 Aug 2018 11:25), Max: 36.8 (14 Aug 2018 11:25)  T(F): 98.2 (14 Aug 2018 11:25), Max: 98.2 (14 Aug 2018 11:25)  HR: 63 (14 Aug 2018 11:25) (63 - 93)  BP: 118/70 (14 Aug 2018 11:25) (118/70 - 165/82)  BP(mean): --  RR: 16 (14 Aug 2018 11:25) (16 - 20)  SpO2: 97% (14 Aug 2018 11:25) (95% - 97%)    08-13 @ 07:01  -  08-14 @ 07:00  --------------------------------------------------------  IN: 700 mL / OUT: 0 mL / NET: 700 mL      Physical Exam:  General: WN/WD NAD  Neurology: A&Ox3, nonfocal, CAPPS x 4  Respiratory: CTA B/L  CV: irregular rate and rhythm, S1S2  Abdominal: Soft, NT, ND +BS  Extremities: +erythema RLE with small puncture sites oozing purulent fluid, seems improved from yesterday. 3+ pitting edema B/L LE      LABS:                        12.2   9.98  )-----------( 366      ( 14 Aug 2018 07:33 )             37.9     08-14    138  |  103  |  9   ----------------------------<  167<H>  3.7   |  25  |  0.59    Ca    8.2<L>      14 Aug 2018 07:33  Phos  3.2     -  Mg     1.6     -    TPro  7.5  /  Alb  2.6<L>  /  TBili  1.0  /  DBili  x   /  AST  218<H>  /  ALT  237<H>  /  AlkPhos  190<H>  -    PT/INR - ( 14 Aug 2018 07:33 )   PT: 29.6 sec;   INR: 2.69 ratio         PTT - ( 13 Aug 2018 13:29 )  PTT:41.8 sec  Urinalysis Basic - ( 13 Aug 2018 13:27 )    Color: Yellow / Appearance: Clear / S.020 / pH: x  Gluc: x / Ketone: Negative  / Bili: Negative / Urobili: 1 mg/dL   Blood: x / Protein: Negative mg/dL / Nitrite: Negative   Leuk Esterase: Trace / RBC: 0-2 /HPF / WBC 0-2   Sq Epi: x / Non Sq Epi: Occasional / Bacteria: Occasional        RADIOLOGY & ADDITIONAL TESTS:

## 2018-08-15 LAB
ALBUMIN SERPL ELPH-MCNC: 2.3 G/DL — LOW (ref 3.3–5)
ALP SERPL-CCNC: 141 U/L — HIGH (ref 40–120)
ALT FLD-CCNC: 154 U/L — HIGH (ref 12–78)
ANION GAP SERPL CALC-SCNC: 9 MMOL/L — SIGNIFICANT CHANGE UP (ref 5–17)
AST SERPL-CCNC: 50 U/L — HIGH (ref 15–37)
BILIRUB SERPL-MCNC: 0.5 MG/DL — SIGNIFICANT CHANGE UP (ref 0.2–1.2)
BUN SERPL-MCNC: 14 MG/DL — SIGNIFICANT CHANGE UP (ref 7–23)
CALCIUM SERPL-MCNC: 8.2 MG/DL — LOW (ref 8.5–10.1)
CHLORIDE SERPL-SCNC: 106 MMOL/L — SIGNIFICANT CHANGE UP (ref 96–108)
CO2 SERPL-SCNC: 27 MMOL/L — SIGNIFICANT CHANGE UP (ref 22–31)
CREAT SERPL-MCNC: 0.41 MG/DL — LOW (ref 0.5–1.3)
GLUCOSE BLDC GLUCOMTR-MCNC: 158 MG/DL — HIGH (ref 70–99)
GLUCOSE BLDC GLUCOMTR-MCNC: 158 MG/DL — HIGH (ref 70–99)
GLUCOSE BLDC GLUCOMTR-MCNC: 168 MG/DL — HIGH (ref 70–99)
GLUCOSE BLDC GLUCOMTR-MCNC: 215 MG/DL — HIGH (ref 70–99)
GLUCOSE SERPL-MCNC: 145 MG/DL — HIGH (ref 70–99)
HCT VFR BLD CALC: 35.4 % — SIGNIFICANT CHANGE UP (ref 34.5–45)
HGB BLD-MCNC: 11.1 G/DL — LOW (ref 11.5–15.5)
INR BLD: 2.61 RATIO — HIGH (ref 0.88–1.16)
MCHC RBC-ENTMCNC: 30.1 PG — SIGNIFICANT CHANGE UP (ref 27–34)
MCHC RBC-ENTMCNC: 31.4 GM/DL — LOW (ref 32–36)
MCV RBC AUTO: 95.9 FL — SIGNIFICANT CHANGE UP (ref 80–100)
NRBC # BLD: 0 /100 WBCS — SIGNIFICANT CHANGE UP (ref 0–0)
PLATELET # BLD AUTO: 318 K/UL — SIGNIFICANT CHANGE UP (ref 150–400)
POTASSIUM SERPL-MCNC: 4 MMOL/L — SIGNIFICANT CHANGE UP (ref 3.5–5.3)
POTASSIUM SERPL-SCNC: 4 MMOL/L — SIGNIFICANT CHANGE UP (ref 3.5–5.3)
PROT SERPL-MCNC: 6.7 GM/DL — SIGNIFICANT CHANGE UP (ref 6–8.3)
PROTHROM AB SERPL-ACNC: 28.8 SEC — HIGH (ref 9.8–12.7)
RBC # BLD: 3.69 M/UL — LOW (ref 3.8–5.2)
RBC # FLD: 15.4 % — HIGH (ref 10.3–14.5)
SODIUM SERPL-SCNC: 142 MMOL/L — SIGNIFICANT CHANGE UP (ref 135–145)
VANCOMYCIN TROUGH SERPL-MCNC: 6.7 UG/ML — LOW (ref 10–20)
WBC # BLD: 9.31 K/UL — SIGNIFICANT CHANGE UP (ref 3.8–10.5)
WBC # FLD AUTO: 9.31 K/UL — SIGNIFICANT CHANGE UP (ref 3.8–10.5)

## 2018-08-15 PROCEDURE — 76700 US EXAM ABDOM COMPLETE: CPT | Mod: 26

## 2018-08-15 RX ORDER — WARFARIN SODIUM 2.5 MG/1
5 TABLET ORAL ONCE
Qty: 0 | Refills: 0 | Status: COMPLETED | OUTPATIENT
Start: 2018-08-15 | End: 2018-08-15

## 2018-08-15 RX ORDER — VANCOMYCIN HCL 1 G
1750 VIAL (EA) INTRAVENOUS EVERY 12 HOURS
Qty: 0 | Refills: 0 | Status: DISCONTINUED | OUTPATIENT
Start: 2018-08-15 | End: 2018-08-22

## 2018-08-15 RX ADMIN — Medication 10 MILLIGRAM(S): at 09:28

## 2018-08-15 RX ADMIN — OXYCODONE HYDROCHLORIDE 10 MILLIGRAM(S): 5 TABLET ORAL at 16:16

## 2018-08-15 RX ADMIN — Medication 1: at 09:21

## 2018-08-15 RX ADMIN — MONTELUKAST 10 MILLIGRAM(S): 4 TABLET, CHEWABLE ORAL at 20:51

## 2018-08-15 RX ADMIN — Medication 1: at 18:36

## 2018-08-15 RX ADMIN — Medication 325 MILLIGRAM(S): at 13:01

## 2018-08-15 RX ADMIN — OXYCODONE HYDROCHLORIDE 5 MILLIGRAM(S): 5 TABLET ORAL at 00:28

## 2018-08-15 RX ADMIN — Medication 81 MILLIGRAM(S): at 13:01

## 2018-08-15 RX ADMIN — OXYCODONE HYDROCHLORIDE 10 MILLIGRAM(S): 5 TABLET ORAL at 16:31

## 2018-08-15 RX ADMIN — OXYCODONE HYDROCHLORIDE 10 MILLIGRAM(S): 5 TABLET ORAL at 23:46

## 2018-08-15 RX ADMIN — CEFEPIME 100 MILLIGRAM(S): 1 INJECTION, POWDER, FOR SOLUTION INTRAMUSCULAR; INTRAVENOUS at 17:14

## 2018-08-15 RX ADMIN — Medication 300 MILLIGRAM(S): at 06:17

## 2018-08-15 RX ADMIN — OXYCODONE HYDROCHLORIDE 10 MILLIGRAM(S): 5 TABLET ORAL at 02:55

## 2018-08-15 RX ADMIN — Medication 20 MILLIEQUIVALENT(S): at 13:01

## 2018-08-15 RX ADMIN — OXYCODONE HYDROCHLORIDE 10 MILLIGRAM(S): 5 TABLET ORAL at 09:21

## 2018-08-15 RX ADMIN — WARFARIN SODIUM 5 MILLIGRAM(S): 2.5 TABLET ORAL at 20:51

## 2018-08-15 RX ADMIN — Medication 100 MILLIGRAM(S): at 05:02

## 2018-08-15 RX ADMIN — FAMOTIDINE 20 MILLIGRAM(S): 10 INJECTION INTRAVENOUS at 13:01

## 2018-08-15 RX ADMIN — CEFEPIME 100 MILLIGRAM(S): 1 INJECTION, POWDER, FOR SOLUTION INTRAMUSCULAR; INTRAVENOUS at 05:00

## 2018-08-15 RX ADMIN — OXYCODONE HYDROCHLORIDE 10 MILLIGRAM(S): 5 TABLET ORAL at 09:40

## 2018-08-15 RX ADMIN — Medication 1: at 14:52

## 2018-08-15 RX ADMIN — Medication 250 MILLIGRAM(S): at 18:40

## 2018-08-15 NOTE — PROGRESS NOTE ADULT - ASSESSMENT
67 y/o female with h/o A.fib on coumadin, CHF (unknown EF), DM, ulcerative colitis, HTN, PVD was admitted on 8/13 after she was sent in by vascular due to worsening B/L LE cellulitis and edema x 2 weeks. Patient states she had been wearing russ boots to help with her LE edema and they were changed weekly. Patient states last week when boots were being changed, she had noticed increased redness on her RLE, but the boots were placed anyway. Patient began to experience pain and when boots were taken off, she noticed the RLE was severely discolored. Patient was placed on outpatient antibiotics by Dr. Rivera 3 days PTA, but her legs did not improve much and when she saw Dr. Rivera in office, she was sent to ED for IV antibiotics. Patient denied any fever or chills. In ER, she received vancomycin IV.    1. B/l lower extremities chronic stasis dermatitis with superimposed extensive RLE cellulitis. PVD. Allergy to PCN.   -right leg is slighly improved  -f/u BC x 2  -on vancomycin 1.5 gm IV q12h and cefepime 2 gm IV q12h # 2  -tolerating abx well so far; no side effects noted  -obtain vancomycin trough level   -monitor closely in ej of PCN allergy history  -elevate legs  -continue IV abx coverage  -monitor temps  -f/u CBC  -supportive care  2. Other issues:   -care per medicine

## 2018-08-15 NOTE — PROGRESS NOTE ADULT - SUBJECTIVE AND OBJECTIVE BOX
INTERVAL HPI/OVERNIGHT EVENTS: 69 y/o F PMHx Afib (on coumadin), CHF (unknown EF), DM, ulcerative colitis, HTN, PVD presents to ED sent in by vascular due to worsening B/L LE cellulitis and edema x2 weeks. Patient states she had been wearing russ boots to help with her LE edema and they were changed weekly. Patient states last week when boots were being changed, she had noticed increased redness on her RLE, but the boots were placed anyway. Patient began to experience pain and when boots were taken off, she noticed the RLE was severely discolored. Patient was placed on outpatient antibiotics by Dr. Rivera on Saturday, but her legs did not improve much and when she saw Dr. Rivera in office today, she was sent to ED for IV antibiotics. Patient denied any fevers, CP, palpitations, abd pain, N/V/D/C. Patient admitted to chills, sweats, SOB (not changed from baseline).    In ED, patient had leukocytosis, elevated lactate- 2.7 after NS bolus 500cc x2. CXR done showing no acute pathology.    8/14/18: Patient seen and examined at bedside. Complains of shooting pain in her R leg where the blisters are located, otherwise feels well. States SOB has improved, denies any CP, palpitations, fevers, chills.  8/15/18: Patient seen and examined at bedside. Patient complains of R leg pain, but states overall better. Denies any CP, SOB, fevers, chills.    MEDICATIONS  (STANDING):  aspirin enteric coated 81 milliGRAM(s) Oral daily  budesonide  ER  tablet (Uceris) 9 milliGRAM(s) 9 milliGRAM(s) Oral <User Schedule>  cefepime   IVPB      cefepime   IVPB 2000 milliGRAM(s) IV Intermittent every 12 hours  dextrose 5%. 1000 milliLiter(s) (50 mL/Hr) IV Continuous <Continuous>  dextrose 50% Injectable 12.5 Gram(s) IV Push once  dextrose 50% Injectable 25 Gram(s) IV Push once  dextrose 50% Injectable 25 Gram(s) IV Push once  dicyclomine 10 milliGRAM(s) Oral daily  diltiazem    Tablet 120 milliGRAM(s) Oral two times a day  famotidine    Tablet 20 milliGRAM(s) Oral daily  ferrous    sulfate 325 milliGRAM(s) Oral daily  furosemide   Injectable 40 milliGRAM(s) IV Push daily  insulin lispro (HumaLOG) corrective regimen sliding scale   SubCutaneous Before meals and at bedtime  metoprolol succinate  milliGRAM(s) Oral daily  montelukast 10 milliGRAM(s) Oral daily  potassium chloride    Tablet ER 20 milliEquivalent(s) Oral daily  vancomycin  IVPB 1750 milliGRAM(s) IV Intermittent every 12 hours    MEDICATIONS  (PRN):  dextrose 40% Gel 15 Gram(s) Oral once PRN Blood Glucose LESS THAN 70 milliGRAM(s)/deciliter  glucagon  Injectable 1 milliGRAM(s) IntraMuscular once PRN Glucose LESS THAN 70 milligrams/deciliter  ibuprofen  Tablet 400 milliGRAM(s) Oral every 6 hours PRN fever- temp >100.4F  ondansetron Injectable 4 milliGRAM(s) IV Push every 6 hours PRN Nausea and/or Vomiting  oxyCODONE    IR 5 milliGRAM(s) Oral every 6 hours PRN Moderate Pain (4 - 6)  oxyCODONE    IR 10 milliGRAM(s) Oral every 6 hours PRN Severe Pain (7 - 10)      Allergies    penicillin (Hives)    Intolerances        ROS:  CONSTITUTIONAL: No weakness, fevers or chills  EYES/ENT: No visual changes;  No vertigo or throat pain   NECK: No pain or stiffness  RESPIRATORY: No cough, wheezing, hemoptysis; No shortness of breath  CARDIOVASCULAR: No chest pain or palpitations  GASTROINTESTINAL: No abdominal or epigastric pain. No nausea, vomiting, or hematemesis; No diarrhea or constipation.  GENITOURINARY: No dysuria, frequency or hematuria  NEUROLOGICAL: No numbness or weakness  SKIN: + cellulitis RLE  MSK: + R leg pain   All other review of systems is negative unless indicated above.      Vital Signs Last 24 Hrs  T(C): 36.5 (15 Aug 2018 04:55), Max: 37 (14 Aug 2018 17:03)  T(F): 97.7 (15 Aug 2018 04:55), Max: 98.6 (14 Aug 2018 17:03)  HR: 85 (15 Aug 2018 04:55) (85 - 92)  BP: 146/81 (15 Aug 2018 04:55) (131/69 - 146/81)  BP(mean): --  RR: 18 (15 Aug 2018 04:55) (18 - 18)  SpO2: 93% (15 Aug 2018 04:55) (93% - 97%)      Physical Exam:  General: WN/WD NAD  Neurology: A&Ox3, nonfocal, CAPPS x 4  Respiratory: CTA B/L  CV: irregular rate and rhythm, S1S2  Abdominal: Soft, NT, ND +BS  Extremities: +erythema RLE with small puncture sites oozing purulent fluid, seems improved from yesterday. 1+ pitting edema B/L LE- improved      LABS:                        11.1   9.31  )-----------( 318      ( 15 Aug 2018 05:25 )             35.4     08-15    142  |  106  |  14  ----------------------------<  145<H>  4.0   |  27  |  0.41<L>    Ca    8.2<L>      15 Aug 2018 05:25  Phos  3.2     08-14  Mg     1.6     08-14    TPro  6.7  /  Alb  2.3<L>  /  TBili  0.5  /  DBili  x   /  AST  50<H>  /  ALT  154<H>  /  AlkPhos  141<H>  08-15    PT/INR - ( 15 Aug 2018 05:25 )   PT: 28.8 sec;   INR: 2.61 ratio               RADIOLOGY & ADDITIONAL TESTS:

## 2018-08-15 NOTE — PROGRESS NOTE ADULT - SUBJECTIVE AND OBJECTIVE BOX
Date of service: 08-15-18 @ 13:28    Lying in bed in NAD  Has RLE pain  Lower leg fees swollen and skin feels tight    ROS: no fever or chills; denies dizziness, no HA, no SOB or cough, no abdominal pain, no diarrhea or constipation; no dysuria    MEDICATIONS  (STANDING):  aspirin enteric coated 81 milliGRAM(s) Oral daily  budesonide  ER  tablet (Uceris) 9 milliGRAM(s) 9 milliGRAM(s) Oral <User Schedule>  cefepime   IVPB      cefepime   IVPB 2000 milliGRAM(s) IV Intermittent every 12 hours  dextrose 5%. 1000 milliLiter(s) (50 mL/Hr) IV Continuous <Continuous>  dextrose 50% Injectable 12.5 Gram(s) IV Push once  dextrose 50% Injectable 25 Gram(s) IV Push once  dextrose 50% Injectable 25 Gram(s) IV Push once  dicyclomine 10 milliGRAM(s) Oral daily  diltiazem    Tablet 120 milliGRAM(s) Oral two times a day  famotidine    Tablet 20 milliGRAM(s) Oral daily  ferrous    sulfate 325 milliGRAM(s) Oral daily  furosemide   Injectable 40 milliGRAM(s) IV Push daily  insulin lispro (HumaLOG) corrective regimen sliding scale   SubCutaneous Before meals and at bedtime  metoprolol succinate  milliGRAM(s) Oral daily  montelukast 10 milliGRAM(s) Oral daily  potassium chloride    Tablet ER 20 milliEquivalent(s) Oral daily  vancomycin  IVPB 1750 milliGRAM(s) IV Intermittent every 12 hours      Vital Signs Last 24 Hrs  T(C): 36.5 (15 Aug 2018 04:55), Max: 37 (14 Aug 2018 17:03)  T(F): 97.7 (15 Aug 2018 04:55), Max: 98.6 (14 Aug 2018 17:03)  HR: 85 (15 Aug 2018 04:55) (85 - 92)  BP: 146/81 (15 Aug 2018 04:55) (131/69 - 146/81)  BP(mean): --  RR: 18 (15 Aug 2018 04:55) (18 - 18)  SpO2: 93% (15 Aug 2018 04:55) (93% - 97%)    Physical Exam:      Constitutional: frail looking  HEENT: NC/AT, EOMI, PERRLA, conjunctivae clear  Neck: supple; thyroid not palpable  Back: no tenderness  Respiratory: respiratory effort normal; clear to auscultation  Cardiovascular: S1S2 regular, no murmurs  Abdomen: soft, not tender, not distended, positive BS  Genitourinary: no suprapubic tenderness  Lymphatic: no LN palpable  Musculoskeletal: no muscle tenderness, no joint swelling or tenderness  Extremities: right leg extensive erythema and edema from ankle to below the knee; skin id tense and tender; few broken skin areas; no discharge  Neurological/ Psychiatric: AxOx3, moving all extremities  Skin: no rashes; no palpable lesions    Labs: all available labs reviewed                        12.2   9.98  )-----------( 366      ( 14 Aug 2018 07:33 )             37.9     08-14    138  |  103  |  9   ----------------------------<  167<H>  3.7   |  25  |  0.59    Ca    8.2<L>      14 Aug 2018 07:33  Phos  3.2     -  Mg     1.6     -    TPro  7.5  /  Alb  2.6<L>  /  TBili  1.0  /  DBili  x   /  AST  218<H>  /  ALT  237<H>  /  AlkPhos  190<H>       LIVER FUNCTIONS - ( 14 Aug 2018 07:33 )  Alb: 2.6 g/dL / Pro: 7.5 gm/dL / ALK PHOS: 190 U/L / ALT: 237 U/L / AST: 218 U/L / GGT: x           Urinalysis Basic - ( 13 Aug 2018 13:27 )    Color: Yellow / Appearance: Clear / S.020 / pH: x  Gluc: x / Ketone: Negative  / Bili: Negative / Urobili: 1 mg/dL   Blood: x / Protein: Negative mg/dL / Nitrite: Negative   Leuk Esterase: Trace / RBC: 0-2 /HPF / WBC 0-2   Sq Epi: x / Non Sq Epi: Occasional / Bacteria: Occasional      Culture - Blood (collected 13 Aug 2018 13:41)  Source: .Blood None  Preliminary Report (14 Aug 2018 19:01):    No growth to date.    Culture - Blood (collected 13 Aug 2018 13:29)  Source: .Blood Blood-Peripheral  Preliminary Report (14 Aug 2018 19:01):    No growth to date.    Culture - Urine (collected 13 Aug 2018 13:27)  Source: .Urine Clean Catch (Midstream)  Final Report (14 Aug 2018 22:27):    No growth          Radiology: all available radiological tests reviewed    Advanced directives addressed: full resuscitation

## 2018-08-15 NOTE — PROGRESS NOTE ADULT - ASSESSMENT
67 y/o F PMHx Afib (on coumadin), CHF (unknown EF), DM, ulcerative colitis, HTN, PVD presents to ED sent in by vascular due to worsening B/L LE cellulitis and edema x2 weeks being admitted for cellulitis    #Cellulitis  -Continue IV antibiotics- increased vanco to 1750mg Q12 as vanco trough was low  -ID consulted, appreciate recommendations  -F/U blood cx, urine cx  -F/U Am labs  -Pain management    #Afib  -Continue coumadin 5mg tonight  -Continue cardizem and metoprolol  -Monitor BP  -F/U PT/INR in AM    #SOB- resolved  #CHF  -SOB likely 2/2 CHF vs. morbid obesity  -Lasix IV  -Daily weights  -Strict Is and Os    #Transaminitis  -LFTs trending down  -Unknown etiology  -Will check liver US- showed fatty liver  -Hold hepatotoxic medications  -F/U repeat CMP in AM    #DM  -FS AC and HS  -Low dose ISS  -F/U HgbA1c in AM    #Ulcerative colitis  -Continue home medications    #HTN  -Continue home medications with hold parameters  -Monitor BP    #DVT ppx  -On coumadin for Afib- therapeutic INR    Total time spent: 45 minutes

## 2018-08-16 LAB
ALBUMIN SERPL ELPH-MCNC: 2.8 G/DL — LOW (ref 3.3–5)
ALP SERPL-CCNC: 161 U/L — HIGH (ref 40–120)
ALT FLD-CCNC: 123 U/L — HIGH (ref 12–78)
ANION GAP SERPL CALC-SCNC: 6 MMOL/L — SIGNIFICANT CHANGE UP (ref 5–17)
AST SERPL-CCNC: 19 U/L — SIGNIFICANT CHANGE UP (ref 15–37)
BILIRUB SERPL-MCNC: 0.5 MG/DL — SIGNIFICANT CHANGE UP (ref 0.2–1.2)
BUN SERPL-MCNC: 11 MG/DL — SIGNIFICANT CHANGE UP (ref 7–23)
CALCIUM SERPL-MCNC: 8.9 MG/DL — SIGNIFICANT CHANGE UP (ref 8.5–10.1)
CHLORIDE SERPL-SCNC: 101 MMOL/L — SIGNIFICANT CHANGE UP (ref 96–108)
CO2 SERPL-SCNC: 29 MMOL/L — SIGNIFICANT CHANGE UP (ref 22–31)
CREAT SERPL-MCNC: 0.65 MG/DL — SIGNIFICANT CHANGE UP (ref 0.5–1.3)
GLUCOSE BLDC GLUCOMTR-MCNC: 147 MG/DL — HIGH (ref 70–99)
GLUCOSE BLDC GLUCOMTR-MCNC: 182 MG/DL — HIGH (ref 70–99)
GLUCOSE BLDC GLUCOMTR-MCNC: 191 MG/DL — HIGH (ref 70–99)
GLUCOSE BLDC GLUCOMTR-MCNC: 225 MG/DL — HIGH (ref 70–99)
GLUCOSE SERPL-MCNC: 178 MG/DL — HIGH (ref 70–99)
HCT VFR BLD CALC: 39.7 % — SIGNIFICANT CHANGE UP (ref 34.5–45)
HGB BLD-MCNC: 12.7 G/DL — SIGNIFICANT CHANGE UP (ref 11.5–15.5)
INR BLD: 3.06 RATIO — HIGH (ref 0.88–1.16)
MCHC RBC-ENTMCNC: 30.2 PG — SIGNIFICANT CHANGE UP (ref 27–34)
MCHC RBC-ENTMCNC: 32 GM/DL — SIGNIFICANT CHANGE UP (ref 32–36)
MCV RBC AUTO: 94.3 FL — SIGNIFICANT CHANGE UP (ref 80–100)
NRBC # BLD: 0 /100 WBCS — SIGNIFICANT CHANGE UP (ref 0–0)
PLATELET # BLD AUTO: 359 K/UL — SIGNIFICANT CHANGE UP (ref 150–400)
POTASSIUM SERPL-MCNC: 3.9 MMOL/L — SIGNIFICANT CHANGE UP (ref 3.5–5.3)
POTASSIUM SERPL-SCNC: 3.9 MMOL/L — SIGNIFICANT CHANGE UP (ref 3.5–5.3)
PROT SERPL-MCNC: 7.7 GM/DL — SIGNIFICANT CHANGE UP (ref 6–8.3)
PROTHROM AB SERPL-ACNC: 33.8 SEC — HIGH (ref 9.8–12.7)
RBC # BLD: 4.21 M/UL — SIGNIFICANT CHANGE UP (ref 3.8–5.2)
RBC # FLD: 15 % — HIGH (ref 10.3–14.5)
SODIUM SERPL-SCNC: 136 MMOL/L — SIGNIFICANT CHANGE UP (ref 135–145)
WBC # BLD: 12.49 K/UL — HIGH (ref 3.8–10.5)
WBC # FLD AUTO: 12.49 K/UL — HIGH (ref 3.8–10.5)

## 2018-08-16 RX ORDER — HYDROMORPHONE HYDROCHLORIDE 2 MG/ML
0.5 INJECTION INTRAMUSCULAR; INTRAVENOUS; SUBCUTANEOUS ONCE
Qty: 0 | Refills: 0 | Status: DISCONTINUED | OUTPATIENT
Start: 2018-08-16 | End: 2018-08-16

## 2018-08-16 RX ADMIN — Medication 81 MILLIGRAM(S): at 10:28

## 2018-08-16 RX ADMIN — Medication 1: at 21:50

## 2018-08-16 RX ADMIN — Medication 325 MILLIGRAM(S): at 10:28

## 2018-08-16 RX ADMIN — Medication 250 MILLIGRAM(S): at 18:31

## 2018-08-16 RX ADMIN — Medication 2: at 17:23

## 2018-08-16 RX ADMIN — MONTELUKAST 10 MILLIGRAM(S): 4 TABLET, CHEWABLE ORAL at 10:28

## 2018-08-16 RX ADMIN — HYDROMORPHONE HYDROCHLORIDE 0.5 MILLIGRAM(S): 2 INJECTION INTRAMUSCULAR; INTRAVENOUS; SUBCUTANEOUS at 22:28

## 2018-08-16 RX ADMIN — Medication 20 MILLIEQUIVALENT(S): at 10:28

## 2018-08-16 RX ADMIN — FAMOTIDINE 20 MILLIGRAM(S): 10 INJECTION INTRAVENOUS at 10:28

## 2018-08-16 RX ADMIN — Medication 10 MILLIGRAM(S): at 10:29

## 2018-08-16 RX ADMIN — Medication 250 MILLIGRAM(S): at 05:52

## 2018-08-16 RX ADMIN — OXYCODONE HYDROCHLORIDE 10 MILLIGRAM(S): 5 TABLET ORAL at 17:19

## 2018-08-16 RX ADMIN — CEFEPIME 100 MILLIGRAM(S): 1 INJECTION, POWDER, FOR SOLUTION INTRAMUSCULAR; INTRAVENOUS at 17:21

## 2018-08-16 RX ADMIN — CEFEPIME 100 MILLIGRAM(S): 1 INJECTION, POWDER, FOR SOLUTION INTRAMUSCULAR; INTRAVENOUS at 05:05

## 2018-08-16 RX ADMIN — OXYCODONE HYDROCHLORIDE 10 MILLIGRAM(S): 5 TABLET ORAL at 10:29

## 2018-08-16 RX ADMIN — Medication 40 MILLIGRAM(S): at 05:09

## 2018-08-16 RX ADMIN — Medication 1: at 09:26

## 2018-08-16 RX ADMIN — Medication 100 MILLIGRAM(S): at 05:06

## 2018-08-16 NOTE — PROGRESS NOTE ADULT - SUBJECTIVE AND OBJECTIVE BOX
INTERVAL HPI/OVERNIGHT EVENTS:    MEDICATIONS  (STANDING):  aspirin enteric coated 81 milliGRAM(s) Oral daily  budesonide  ER  tablet (Uceris) 9 milliGRAM(s) 9 milliGRAM(s) Oral <User Schedule>  cefepime   IVPB      cefepime   IVPB 2000 milliGRAM(s) IV Intermittent every 12 hours  dextrose 5%. 1000 milliLiter(s) (50 mL/Hr) IV Continuous <Continuous>  dextrose 50% Injectable 12.5 Gram(s) IV Push once  dextrose 50% Injectable 25 Gram(s) IV Push once  dextrose 50% Injectable 25 Gram(s) IV Push once  dicyclomine 10 milliGRAM(s) Oral daily  diltiazem    Tablet 120 milliGRAM(s) Oral two times a day  famotidine    Tablet 20 milliGRAM(s) Oral daily  ferrous    sulfate 325 milliGRAM(s) Oral daily  furosemide   Injectable 40 milliGRAM(s) IV Push daily  insulin lispro (HumaLOG) corrective regimen sliding scale   SubCutaneous Before meals and at bedtime  metoprolol succinate  milliGRAM(s) Oral daily  montelukast 10 milliGRAM(s) Oral daily  potassium chloride    Tablet ER 20 milliEquivalent(s) Oral daily  vancomycin  IVPB 1750 milliGRAM(s) IV Intermittent every 12 hours    MEDICATIONS  (PRN):  dextrose 40% Gel 15 Gram(s) Oral once PRN Blood Glucose LESS THAN 70 milliGRAM(s)/deciliter  glucagon  Injectable 1 milliGRAM(s) IntraMuscular once PRN Glucose LESS THAN 70 milligrams/deciliter  ibuprofen  Tablet 400 milliGRAM(s) Oral every 6 hours PRN fever- temp >100.4F  ondansetron Injectable 4 milliGRAM(s) IV Push every 6 hours PRN Nausea and/or Vomiting  oxyCODONE    IR 5 milliGRAM(s) Oral every 6 hours PRN Moderate Pain (4 - 6)  oxyCODONE    IR 10 milliGRAM(s) Oral every 6 hours PRN Severe Pain (7 - 10)      Allergies    penicillin (Hives)    Intolerances        CONSTITUTIONAL: No weakness, fevers or chills  EYES/ENT: No visual changes;  No vertigo or throat pain   NECK: No pain or stiffness  RESPIRATORY: No cough, wheezing, hemoptysis; No shortness of breath  CARDIOVASCULAR: No chest pain or palpitations  GASTROINTESTINAL: No abdominal or epigastric pain. No nausea, vomiting, or hematemesis; No diarrhea or constipation. No melena or hematochezia.  GENITOURINARY: No dysuria, frequency or hematuria  NEUROLOGICAL: No numbness or weakness  SKIN: No itching, burning, rashes, or lesions   All other review of systems is negative unless indicated above.    Vital Signs Last 24 Hrs  T(C): 36.7 (16 Aug 2018 11:11), Max: 36.7 (16 Aug 2018 11:11)  T(F): 98.1 (16 Aug 2018 11:11), Max: 98.1 (16 Aug 2018 11:11)  HR: 98 (16 Aug 2018 11:11) (84 - 98)  BP: 147/84 (16 Aug 2018 11:11) (133/70 - 155/87)  BP(mean): --  RR: 18 (16 Aug 2018 11:11) (18 - 18)  SpO2: 98% (16 Aug 2018 11:11) (96% - 98%)      General: WN/WD NAD  Neurology: A&Ox3, nonfocal, CAPPS x 4  Respiratory: CTA B/L  CV: RRR, S1S2, no murmurs, rubs or gallops  Abdominal: Soft, NT, ND +BS, Last BM  Extremities: No edema, + peripheral pulses      LABS:                        12.7   12.49 )-----------( 359      ( 16 Aug 2018 07:24 )             39.7     08-16    136  |  101  |  11  ----------------------------<  178<H>  3.9   |  29  |  0.65    Ca    8.9      16 Aug 2018 07:24    TPro  7.7  /  Alb  2.8<L>  /  TBili  0.5  /  DBili  x   /  AST  19  /  ALT  123<H>  /  AlkPhos  161<H>  08-16    PT/INR - ( 16 Aug 2018 07:24 )   PT: 33.8 sec;   INR: 3.06 ratio               RADIOLOGY & ADDITIONAL TESTS: INTERVAL HPI/OVERNIGHT EVENTS:  69 y/o F PMHx Afib (on coumadin), CHF (unknown EF), DM, ulcerative colitis, HTN, PVD presents to ED sent in by vascular due to worsening B/L LE cellulitis and edema x2 weeks. Patient states she had been wearing russ boots to help with her LE edema and they were changed weekly. Patient states last week when boots were being changed, she had noticed increased redness on her RLE, but the boots were placed anyway. Patient began to experience pain and when boots were taken off, she noticed the RLE was severely discolored. Patient was placed on outpatient antibiotics by Dr. Rivera on Saturday, but her legs did not improve much and when she saw Dr. Rivera in office today, she was sent to ED for IV antibiotics. Patient denied any fevers, CP, palpitations, abd pain, N/V/D/C. Patient admitted to chills, sweats, SOB (not changed from baseline).    In ED, patient had leukocytosis, elevated lactate- 2.7 after NS bolus 500cc x2. CXR done showing no acute pathology.    8/14/18: Patient seen and examined at bedside. Complains of shooting pain in her R leg where the blisters are located, otherwise feels well. States SOB has improved, denies any CP, palpitations, fevers, chills.  8/15/18: Patient seen and examined at bedside. Patient complains of R leg pain, but states overall better. Denies any CP, SOB, fevers, chills.  8/16/18: Patient seen and examined at bedside. Complaining of stinging R leg pain, but denies all other complaints.    MEDICATIONS  (STANDING):  aspirin enteric coated 81 milliGRAM(s) Oral daily  budesonide  ER  tablet (Uceris) 9 milliGRAM(s) 9 milliGRAM(s) Oral <User Schedule>  cefepime   IVPB      cefepime   IVPB 2000 milliGRAM(s) IV Intermittent every 12 hours  dextrose 5%. 1000 milliLiter(s) (50 mL/Hr) IV Continuous <Continuous>  dextrose 50% Injectable 12.5 Gram(s) IV Push once  dextrose 50% Injectable 25 Gram(s) IV Push once  dextrose 50% Injectable 25 Gram(s) IV Push once  dicyclomine 10 milliGRAM(s) Oral daily  diltiazem    Tablet 120 milliGRAM(s) Oral two times a day  famotidine    Tablet 20 milliGRAM(s) Oral daily  ferrous    sulfate 325 milliGRAM(s) Oral daily  furosemide   Injectable 40 milliGRAM(s) IV Push daily  insulin lispro (HumaLOG) corrective regimen sliding scale   SubCutaneous Before meals and at bedtime  metoprolol succinate  milliGRAM(s) Oral daily  montelukast 10 milliGRAM(s) Oral daily  potassium chloride    Tablet ER 20 milliEquivalent(s) Oral daily  vancomycin  IVPB 1750 milliGRAM(s) IV Intermittent every 12 hours    MEDICATIONS  (PRN):  dextrose 40% Gel 15 Gram(s) Oral once PRN Blood Glucose LESS THAN 70 milliGRAM(s)/deciliter  glucagon  Injectable 1 milliGRAM(s) IntraMuscular once PRN Glucose LESS THAN 70 milligrams/deciliter  ibuprofen  Tablet 400 milliGRAM(s) Oral every 6 hours PRN fever- temp >100.4F  ondansetron Injectable 4 milliGRAM(s) IV Push every 6 hours PRN Nausea and/or Vomiting  oxyCODONE    IR 5 milliGRAM(s) Oral every 6 hours PRN Moderate Pain (4 - 6)  oxyCODONE    IR 10 milliGRAM(s) Oral every 6 hours PRN Severe Pain (7 - 10)      Allergies    penicillin (Hives)    Intolerances        ROS:  CONSTITUTIONAL: No weakness, fevers or chills  EYES/ENT: No visual changes;  No vertigo or throat pain   NECK: No pain or stiffness  RESPIRATORY: No cough, wheezing, hemoptysis; No shortness of breath  CARDIOVASCULAR: No chest pain or palpitations  GASTROINTESTINAL: No abdominal or epigastric pain. No nausea, vomiting, or hematemesis; No diarrhea or constipation.  GENITOURINARY: No dysuria, frequency or hematuria  NEUROLOGICAL: No numbness or weakness  SKIN: + cellulitis RLE  MSK: + R leg pain   All other review of systems is negative unless indicated above.    Vital Signs Last 24 Hrs  T(C): 36.7 (16 Aug 2018 11:11), Max: 36.7 (16 Aug 2018 11:11)  T(F): 98.1 (16 Aug 2018 11:11), Max: 98.1 (16 Aug 2018 11:11)  HR: 98 (16 Aug 2018 11:11) (84 - 98)  BP: 147/84 (16 Aug 2018 11:11) (133/70 - 155/87)  BP(mean): --  RR: 18 (16 Aug 2018 11:11) (18 - 18)  SpO2: 98% (16 Aug 2018 11:11) (96% - 98%)      Physical Exam:  General: WN/WD NAD  Neurology: A&Ox3, nonfocal, CAPPS x 4  Respiratory: CTA B/L  CV: irregular rate and rhythm, S1S2  Abdominal: Soft, NT, ND +BS, obese  Extremities: +erythema RLE with small puncture sites oozing purulent fluid, seems improved from yesterday. trace pitting edema B/L LE- improved      LABS:                        12.7   12.49 )-----------( 359      ( 16 Aug 2018 07:24 )             39.7     08-16    136  |  101  |  11  ----------------------------<  178<H>  3.9   |  29  |  0.65    Ca    8.9      16 Aug 2018 07:24    TPro  7.7  /  Alb  2.8<L>  /  TBili  0.5  /  DBili  x   /  AST  19  /  ALT  123<H>  /  AlkPhos  161<H>  08-16    PT/INR - ( 16 Aug 2018 07:24 )   PT: 33.8 sec;   INR: 3.06 ratio               RADIOLOGY & ADDITIONAL TESTS:

## 2018-08-16 NOTE — PROGRESS NOTE ADULT - ASSESSMENT
69 y/o female with h/o A.fib on coumadin, CHF (unknown EF), DM, ulcerative colitis, HTN, PVD was admitted on 8/13 after she was sent in by vascular due to worsening B/L LE cellulitis and edema x 2 weeks. Patient states she had been wearing russ boots to help with her LE edema and they were changed weekly. Patient states last week when boots were being changed, she had noticed increased redness on her RLE, but the boots were placed anyway. Patient began to experience pain and when boots were taken off, she noticed the RLE was severely discolored. Patient was placed on outpatient antibiotics by Dr. Rivera 3 days PTA, but her legs did not improve much and when she saw Dr. Rivera in office, she was sent to ED for IV antibiotics. Patient denied any fever or chills. In ER, she received vancomycin IV.    1. B/l lower extremities chronic stasis dermatitis with superimposed extensive RLE cellulitis. PVD. Allergy to PCN.   -right leg is improving slowly  -f/u BC x 2  -on vancomycin 1.5 gm IV q12h and cefepime 2 gm IV q12h # 3  -tolerating abx well so far; no side effects noted  - vancomycin trough level is low, but the patient is clinically improving; keep same dose  -monitor closely in ej of PCN allergy history  -elevate legs  -continue IV abx coverage  -monitor temps  -f/u CBC  -supportive care  2. Other issues:   -care per medicine

## 2018-08-16 NOTE — PROGRESS NOTE ADULT - SUBJECTIVE AND OBJECTIVE BOX
Date of service: 18 @ 12:26    Lying in bed in NAD  Has right lower leg pain  Redness is improving    ROS: no fever or chills; denies dizziness, no HA, no SOB or cough, no abdominal pain, no diarrhea or constipation; no dysuria    MEDICATIONS  (STANDING):  aspirin enteric coated 81 milliGRAM(s) Oral daily  budesonide  ER  tablet (Uceris) 9 milliGRAM(s) 9 milliGRAM(s) Oral <User Schedule>  cefepime   IVPB      cefepime   IVPB 2000 milliGRAM(s) IV Intermittent every 12 hours  dextrose 5%. 1000 milliLiter(s) (50 mL/Hr) IV Continuous <Continuous>  dextrose 50% Injectable 12.5 Gram(s) IV Push once  dextrose 50% Injectable 25 Gram(s) IV Push once  dextrose 50% Injectable 25 Gram(s) IV Push once  dicyclomine 10 milliGRAM(s) Oral daily  diltiazem    Tablet 120 milliGRAM(s) Oral two times a day  famotidine    Tablet 20 milliGRAM(s) Oral daily  ferrous    sulfate 325 milliGRAM(s) Oral daily  furosemide   Injectable 40 milliGRAM(s) IV Push daily  insulin lispro (HumaLOG) corrective regimen sliding scale   SubCutaneous Before meals and at bedtime  metoprolol succinate  milliGRAM(s) Oral daily  montelukast 10 milliGRAM(s) Oral daily  potassium chloride    Tablet ER 20 milliEquivalent(s) Oral daily  vancomycin  IVPB 1750 milliGRAM(s) IV Intermittent every 12 hours      Vital Signs Last 24 Hrs  T(C): 36.7 (16 Aug 2018 11:11), Max: 36.7 (16 Aug 2018 11:11)  T(F): 98.1 (16 Aug 2018 11:11), Max: 98.1 (16 Aug 2018 11:11)  HR: 98 (16 Aug 2018 11:11) (84 - 98)  BP: 147/84 (16 Aug 2018 11:11) (133/70 - 155/87)  BP(mean): --  RR: 18 (16 Aug 2018 11:11) (18 - 18)  SpO2: 98% (16 Aug 2018 11:11) (96% - 98%)    Physical Exam:      Constitutional: frail looking  HEENT: NC/AT, EOMI, PERRLA, conjunctivae clear  Neck: supple; thyroid not palpable  Back: no tenderness  Respiratory: respiratory effort normal; clear to auscultation  Cardiovascular: S1S2 regular, no murmurs  Abdomen: soft, not tender, not distended, positive BS  Genitourinary: no suprapubic tenderness  Lymphatic: no LN palpable  Musculoskeletal: no muscle tenderness, no joint swelling or tenderness  Extremities: right leg extensive erythema and edema from ankle to below the knee; skin id tense and tender; few broken skin areas - improving  Neurological/ Psychiatric: AxOx3, moving all extremities  Skin: no rashes; no palpable lesions    Labs: reviewed                        12.7   12.49 )-----------( 359      ( 16 Aug 2018 07:24 )             39.7     08-16    136  |  101  |  11  ----------------------------<  178<H>  3.9   |  29  |  0.65    Ca    8.9      16 Aug 2018 07:24    TPro  7.7  /  Alb  2.8<L>  /  TBili  0.5  /  DBili  x   /  AST  19  /  ALT  123<H>  /  AlkPhos  161<H>        Vancomycin Level, Trough: 6.7 ug/mL (08-15 @ 05:25)                        12.2   9.98  )-----------( 366      ( 14 Aug 2018 07:33 )             37.9     -    138  |  103  |  9   ----------------------------<  167<H>  3.7   |  25  |  0.59    Ca    8.2<L>      14 Aug 2018 07:33  Phos  3.2     -  Mg     1.6         TPro  7.5  /  Alb  2.6<L>  /  TBili  1.0  /  DBili  x   /  AST  218<H>  /  ALT  237<H>  /  AlkPhos  190<H>       LIVER FUNCTIONS - ( 14 Aug 2018 07:33 )  Alb: 2.6 g/dL / Pro: 7.5 gm/dL / ALK PHOS: 190 U/L / ALT: 237 U/L / AST: 218 U/L / GGT: x           Urinalysis Basic - ( 13 Aug 2018 13:27 )    Color: Yellow / Appearance: Clear / S.020 / pH: x  Gluc: x / Ketone: Negative  / Bili: Negative / Urobili: 1 mg/dL   Blood: x / Protein: Negative mg/dL / Nitrite: Negative   Leuk Esterase: Trace / RBC: 0-2 /HPF / WBC 0-2   Sq Epi: x / Non Sq Epi: Occasional / Bacteria: Occasional      Culture - Blood (collected 13 Aug 2018 13:41)  Source: .Blood None  Preliminary Report (14 Aug 2018 19:01):    No growth to date.    Culture - Blood (collected 13 Aug 2018 13:29)  Source: .Blood Blood-Peripheral  Preliminary Report (14 Aug 2018 19:01):    No growth to date.    Culture - Urine (collected 13 Aug 2018 13:27)  Source: .Urine Clean Catch (Midstream)  Final Report (14 Aug 2018 22:27):    No growth          Radiology: all available radiological tests reviewed    Advanced directives addressed: full resuscitation

## 2018-08-16 NOTE — PROGRESS NOTE ADULT - ASSESSMENT
69 y/o F PMHx Afib (on coumadin), CHF (unknown EF), DM, ulcerative colitis, HTN, PVD presents to ED sent in by vascular due to worsening B/L LE cellulitis and edema x2 weeks being admitted for cellulitis    #Cellulitis  -Continue IV antibiotics- vanco 1750mg Q12 - f/u vanco trough  -ID consulted, appreciate recommendations  -F/U blood cx, urine cx  -F/U Am labs  -Pain management    #Afib  -INR supratherapeutic at 3.06, hold coumadin tonight  -Continue cardizem and metoprolol  -Monitor BP  -F/U PT/INR in AM    #SOB- resolved  #CHF  -SOB likely 2/2 CHF vs. morbid obesity  -Lasix IV  -Daily weights  -Strict Is and Os    #Transaminitis  -LFTs trending down  -Unknown etiology  -Will check liver US- showed fatty liver  -Hold hepatotoxic medications  -F/U repeat CMP in AM    #DM  -FS AC and HS  -Low dose ISS  -F/U HgbA1c in AM    #Ulcerative colitis  -Continue home medications    #HTN  -Continue home medications with hold parameters  -Monitor BP    #DVT ppx  -On coumadin for Afib- will hold tonight 2/2 supratherapeutic INR- f/u repeat PT/INR in AM    Total time spent: 45 minutes 69 y/o F PMHx Afib (on coumadin), CHF (unknown EF), DM, ulcerative colitis, HTN, PVD presents to ED sent in by vascular due to worsening B/L LE cellulitis and edema x2 weeks being admitted for cellulitis    #Cellulitis  -Continue IV antibiotics- vanco 1750mg Q12 - f/u vanco trough  -ID consulted, appreciate recommendations  -F/U blood cx, urine cx  -F/U Am labs  -Pain management    #Afib  -INR supratherapeutic at 3.06, no active signs of bleeding, hold coumadin tonight  -Continue cardizem and metoprolol  -Monitor BP  -F/U PT/INR in AM    #SOB- resolved  #CHF  -SOB likely 2/2 CHF vs. morbid obesity  -Lasix IV  -Daily weights  -Strict Is and Os    #Transaminitis  -LFTs trending down  -Unknown etiology  -Will check liver US- showed fatty liver  -Hold hepatotoxic medications  -F/U repeat CMP in AM    #DM  -FS AC and HS  -Low dose ISS  -F/U HgbA1c in AM    #Ulcerative colitis  -Continue home medications    #HTN  -Continue home medications with hold parameters  -Monitor BP    #DVT ppx  -On coumadin for Afib- will hold tonight 2/2 supratherapeutic INR- f/u repeat PT/INR in AM    Total time spent: 45 minutes

## 2018-08-17 LAB
ALBUMIN SERPL ELPH-MCNC: 2.4 G/DL — LOW (ref 3.3–5)
ALP SERPL-CCNC: 197 U/L — HIGH (ref 40–120)
ALT FLD-CCNC: 111 U/L — HIGH (ref 12–78)
ANION GAP SERPL CALC-SCNC: 7 MMOL/L — SIGNIFICANT CHANGE UP (ref 5–17)
AST SERPL-CCNC: 54 U/L — HIGH (ref 15–37)
BILIRUB SERPL-MCNC: 0.6 MG/DL — SIGNIFICANT CHANGE UP (ref 0.2–1.2)
BUN SERPL-MCNC: 11 MG/DL — SIGNIFICANT CHANGE UP (ref 7–23)
CALCIUM SERPL-MCNC: 8.6 MG/DL — SIGNIFICANT CHANGE UP (ref 8.5–10.1)
CHLORIDE SERPL-SCNC: 102 MMOL/L — SIGNIFICANT CHANGE UP (ref 96–108)
CO2 SERPL-SCNC: 28 MMOL/L — SIGNIFICANT CHANGE UP (ref 22–31)
CREAT SERPL-MCNC: 0.55 MG/DL — SIGNIFICANT CHANGE UP (ref 0.5–1.3)
GLUCOSE BLDC GLUCOMTR-MCNC: 113 MG/DL — HIGH (ref 70–99)
GLUCOSE BLDC GLUCOMTR-MCNC: 140 MG/DL — HIGH (ref 70–99)
GLUCOSE BLDC GLUCOMTR-MCNC: 160 MG/DL — HIGH (ref 70–99)
GLUCOSE BLDC GLUCOMTR-MCNC: 161 MG/DL — HIGH (ref 70–99)
GLUCOSE SERPL-MCNC: 155 MG/DL — HIGH (ref 70–99)
HCT VFR BLD CALC: 37.1 % — SIGNIFICANT CHANGE UP (ref 34.5–45)
HGB BLD-MCNC: 11.9 G/DL — SIGNIFICANT CHANGE UP (ref 11.5–15.5)
INR BLD: 2.43 RATIO — HIGH (ref 0.88–1.16)
MCHC RBC-ENTMCNC: 30.5 PG — SIGNIFICANT CHANGE UP (ref 27–34)
MCHC RBC-ENTMCNC: 32.1 GM/DL — SIGNIFICANT CHANGE UP (ref 32–36)
MCV RBC AUTO: 95.1 FL — SIGNIFICANT CHANGE UP (ref 80–100)
NRBC # BLD: 0 /100 WBCS — SIGNIFICANT CHANGE UP (ref 0–0)
PLATELET # BLD AUTO: 337 K/UL — SIGNIFICANT CHANGE UP (ref 150–400)
POTASSIUM SERPL-MCNC: 3.7 MMOL/L — SIGNIFICANT CHANGE UP (ref 3.5–5.3)
POTASSIUM SERPL-SCNC: 3.7 MMOL/L — SIGNIFICANT CHANGE UP (ref 3.5–5.3)
PROT SERPL-MCNC: 6.9 GM/DL — SIGNIFICANT CHANGE UP (ref 6–8.3)
PROTHROM AB SERPL-ACNC: 26.7 SEC — HIGH (ref 9.8–12.7)
RBC # BLD: 3.9 M/UL — SIGNIFICANT CHANGE UP (ref 3.8–5.2)
RBC # FLD: 15 % — HIGH (ref 10.3–14.5)
SODIUM SERPL-SCNC: 137 MMOL/L — SIGNIFICANT CHANGE UP (ref 135–145)
VANCOMYCIN TROUGH SERPL-MCNC: 10.6 UG/ML — SIGNIFICANT CHANGE UP (ref 10–20)
WBC # BLD: 10.86 K/UL — HIGH (ref 3.8–10.5)
WBC # FLD AUTO: 10.86 K/UL — HIGH (ref 3.8–10.5)

## 2018-08-17 RX ORDER — HYDROMORPHONE HYDROCHLORIDE 2 MG/ML
0.5 INJECTION INTRAMUSCULAR; INTRAVENOUS; SUBCUTANEOUS EVERY 6 HOURS
Qty: 0 | Refills: 0 | Status: DISCONTINUED | OUTPATIENT
Start: 2018-08-17 | End: 2018-08-22

## 2018-08-17 RX ORDER — WARFARIN SODIUM 2.5 MG/1
5 TABLET ORAL ONCE
Qty: 0 | Refills: 0 | Status: COMPLETED | OUTPATIENT
Start: 2018-08-17 | End: 2018-08-17

## 2018-08-17 RX ORDER — GABAPENTIN 400 MG/1
300 CAPSULE ORAL
Qty: 0 | Refills: 0 | Status: DISCONTINUED | OUTPATIENT
Start: 2018-08-17 | End: 2018-08-19

## 2018-08-17 RX ORDER — DOCUSATE SODIUM 100 MG
100 CAPSULE ORAL THREE TIMES A DAY
Qty: 0 | Refills: 0 | Status: DISCONTINUED | OUTPATIENT
Start: 2018-08-17 | End: 2018-08-22

## 2018-08-17 RX ORDER — OXYCODONE HYDROCHLORIDE 5 MG/1
10 TABLET ORAL EVERY 6 HOURS
Qty: 0 | Refills: 0 | Status: DISCONTINUED | OUTPATIENT
Start: 2018-08-17 | End: 2018-08-22

## 2018-08-17 RX ADMIN — MONTELUKAST 10 MILLIGRAM(S): 4 TABLET, CHEWABLE ORAL at 11:40

## 2018-08-17 RX ADMIN — Medication 250 MILLIGRAM(S): at 18:12

## 2018-08-17 RX ADMIN — GABAPENTIN 300 MILLIGRAM(S): 400 CAPSULE ORAL at 13:08

## 2018-08-17 RX ADMIN — CEFEPIME 100 MILLIGRAM(S): 1 INJECTION, POWDER, FOR SOLUTION INTRAMUSCULAR; INTRAVENOUS at 17:29

## 2018-08-17 RX ADMIN — OXYCODONE HYDROCHLORIDE 10 MILLIGRAM(S): 5 TABLET ORAL at 02:29

## 2018-08-17 RX ADMIN — ONDANSETRON 4 MILLIGRAM(S): 8 TABLET, FILM COATED ORAL at 18:11

## 2018-08-17 RX ADMIN — Medication 20 MILLIEQUIVALENT(S): at 11:40

## 2018-08-17 RX ADMIN — HYDROMORPHONE HYDROCHLORIDE 0.5 MILLIGRAM(S): 2 INJECTION INTRAMUSCULAR; INTRAVENOUS; SUBCUTANEOUS at 21:29

## 2018-08-17 RX ADMIN — FAMOTIDINE 20 MILLIGRAM(S): 10 INJECTION INTRAVENOUS at 11:40

## 2018-08-17 RX ADMIN — Medication 1: at 08:29

## 2018-08-17 RX ADMIN — GABAPENTIN 300 MILLIGRAM(S): 400 CAPSULE ORAL at 21:29

## 2018-08-17 RX ADMIN — Medication 250 MILLIGRAM(S): at 06:06

## 2018-08-17 RX ADMIN — Medication 325 MILLIGRAM(S): at 11:40

## 2018-08-17 RX ADMIN — CEFEPIME 100 MILLIGRAM(S): 1 INJECTION, POWDER, FOR SOLUTION INTRAMUSCULAR; INTRAVENOUS at 05:12

## 2018-08-17 RX ADMIN — Medication 81 MILLIGRAM(S): at 11:39

## 2018-08-17 RX ADMIN — WARFARIN SODIUM 5 MILLIGRAM(S): 2.5 TABLET ORAL at 21:29

## 2018-08-17 RX ADMIN — Medication 10 MILLIGRAM(S): at 11:40

## 2018-08-17 RX ADMIN — OXYCODONE HYDROCHLORIDE 10 MILLIGRAM(S): 5 TABLET ORAL at 14:36

## 2018-08-17 RX ADMIN — OXYCODONE HYDROCHLORIDE 10 MILLIGRAM(S): 5 TABLET ORAL at 08:29

## 2018-08-17 RX ADMIN — OXYCODONE HYDROCHLORIDE 10 MILLIGRAM(S): 5 TABLET ORAL at 09:20

## 2018-08-17 RX ADMIN — Medication 100 MILLIGRAM(S): at 05:13

## 2018-08-17 RX ADMIN — Medication 1: at 17:41

## 2018-08-17 NOTE — PROGRESS NOTE ADULT - ASSESSMENT
67 y/o F PMHx Afib (on coumadin), CHF (unknown EF), DM, ulcerative colitis, HTN, PVD presents to ED sent in by vascular due to worsening B/L LE cellulitis and edema x2 weeks being admitted for cellulitis    #Cellulitis  -Continue IV antibiotics- vanco 1750mg Q12 - f/u vanco trough and cefepime  -ID consulted, appreciate recommendations  -F/U blood cx, urine cx- NGTD  -F/U Am labs  -Pain management- dilaudid 0.5mg Q6H prn severe pain, added gabapentin for neuropathic pain  -PT consulted for dispo planning    #Afib  -INR therapeutic at 2.43, coumadin 5mg tonight  -Continue cardizem and metoprolol  -Monitor BP  -F/U PT/INR in AM    #SOB- resolved  #CHF  -SOB likely 2/2 CHF vs. morbid obesity  -Lasix IV  -Daily weights  -Strict Is and Os    #Transaminitis  -Unknown etiology  -Will check liver US- showed fatty liver  -Hold hepatotoxic medications  -F/U repeat CMP in AM    #DM  -FS AC and HS  -Low dose ISS  -HgbA1c- 6.3    #Ulcerative colitis  -Continue home medications    #HTN  -Continue home medications with hold parameters  -Monitor BP    #DVT ppx  -On coumadin for Afib- 5mg coumadin tonight- f/u repeat PT/INR in AM    Total time spent: 43 minutes

## 2018-08-17 NOTE — PHYSICAL THERAPY INITIAL EVALUATION ADULT - GENERAL OBSERVATIONS, REHAB EVAL
resting in bed with feet elevated on soft pillow ,obese female,++erthema R leg ,with purplish discoloration/blisters visible post.R calf,L leg mild erthema,chronic venous stasis changes ,A& Ox3

## 2018-08-17 NOTE — PHYSICAL THERAPY INITIAL EVALUATION ADULT - CRITERIA FOR SKILLED THERAPEUTIC INTERVENTIONS
functional limitations in following categories/risk reduction/prevention/rehab potential/predicted duration of therapy intervention/impairments found/therapy frequency/anticipated equipment needs at discharge/anticipated discharge recommendation

## 2018-08-17 NOTE — PHYSICAL THERAPY INITIAL EVALUATION ADULT - PLANNED THERAPY INTERVENTIONS, PT EVAL
gait training/stretching/ROM/transfer training/bed mobility training/offload R posterior leg & heels ,pt education in life-long compression when skin healed/strengthening

## 2018-08-17 NOTE — PROGRESS NOTE ADULT - SUBJECTIVE AND OBJECTIVE BOX
Date of service: 18 @ 13:36    Right leg feels less swollen  Has right leg pain    ROS: no fever or chills; denies dizziness, no HA, no SOB or cough, no abdominal pain, no diarrhea or constipation; no dysuria    MEDICATIONS  (STANDING):  aspirin enteric coated 81 milliGRAM(s) Oral daily  budesonide  ER  tablet (Uceris) 9 milliGRAM(s) 9 milliGRAM(s) Oral <User Schedule>  cefepime   IVPB      cefepime   IVPB 2000 milliGRAM(s) IV Intermittent every 12 hours  dextrose 5%. 1000 milliLiter(s) (50 mL/Hr) IV Continuous <Continuous>  dextrose 50% Injectable 12.5 Gram(s) IV Push once  dextrose 50% Injectable 25 Gram(s) IV Push once  dextrose 50% Injectable 25 Gram(s) IV Push once  dicyclomine 10 milliGRAM(s) Oral daily  diltiazem    Tablet 120 milliGRAM(s) Oral two times a day  famotidine    Tablet 20 milliGRAM(s) Oral daily  ferrous    sulfate 325 milliGRAM(s) Oral daily  furosemide   Injectable 40 milliGRAM(s) IV Push daily  gabapentin 300 milliGRAM(s) Oral two times a day  insulin lispro (HumaLOG) corrective regimen sliding scale   SubCutaneous Before meals and at bedtime  metoprolol succinate  milliGRAM(s) Oral daily  montelukast 10 milliGRAM(s) Oral daily  potassium chloride    Tablet ER 20 milliEquivalent(s) Oral daily  vancomycin  IVPB 1750 milliGRAM(s) IV Intermittent every 12 hours      Vital Signs Last 24 Hrs  T(C): 36.7 (17 Aug 2018 11:41), Max: 36.9 (17 Aug 2018 09:41)  T(F): 98.1 (17 Aug 2018 11:41), Max: 98.5 (17 Aug 2018 09:41)  HR: 75 (17 Aug 2018 11:41) (67 - 94)  BP: 134/51 (17 Aug 2018 11:41) (134/51 - 160/72)  BP(mean): --  RR: 20 (17 Aug 2018 11:41) (18 - 20)  SpO2: 98% (17 Aug 2018 09:41) (94% - 98%)    Physical Exam:      Constitutional: frail looking  HEENT: NC/AT, EOMI, PERRLA, conjunctivae clear  Neck: supple; thyroid not palpable  Back: no tenderness  Respiratory: respiratory effort normal; clear to auscultation  Cardiovascular: S1S2 regular, no murmurs  Abdomen: soft, not tender, not distended, positive BS  Genitourinary: no suprapubic tenderness  Lymphatic: no LN palpable  Musculoskeletal: no muscle tenderness, no joint swelling or tenderness  Extremities: right leg extensive erythema and edema from ankle to below the knee; skin id tense and tender; few broken skin areas - improving slowly  Neurological/ Psychiatric: AxOx3, moving all extremities  Skin: no rashes; no palpable lesions    Labs: reviewed                        11.9   10.86 )-----------( 337      ( 17 Aug 2018 07:21 )             37.1     -    137  |  102  |  11  ----------------------------<  155<H>  3.7   |  28  |  0.55    Ca    8.6      17 Aug 2018 07:21    TPro  6.9  /  Alb  2.4<L>  /  TBili  0.6  /  DBili  x   /  AST  54<H>  /  ALT  111<H>  /  AlkPhos  197<H>        Vancomycin Level, Trough: 6.7 ug/mL (08-15 @ 05:25)                        12.2   9.98  )-----------( 366      ( 14 Aug 2018 07:33 )             37.9     -    138  |  103  |  9   ----------------------------<  167<H>  3.7   |  25  |  0.59    Ca    8.2<L>      14 Aug 2018 07:33  Phos  3.2     -  Mg     1.6     -    TPro  7.5  /  Alb  2.6<L>  /  TBili  1.0  /  DBili  x   /  AST  218<H>  /  ALT  237<H>  /  AlkPhos  190<H>       LIVER FUNCTIONS - ( 14 Aug 2018 07:33 )  Alb: 2.6 g/dL / Pro: 7.5 gm/dL / ALK PHOS: 190 U/L / ALT: 237 U/L / AST: 218 U/L / GGT: x           Urinalysis Basic - ( 13 Aug 2018 13:27 )    Color: Yellow / Appearance: Clear / S.020 / pH: x  Gluc: x / Ketone: Negative  / Bili: Negative / Urobili: 1 mg/dL   Blood: x / Protein: Negative mg/dL / Nitrite: Negative   Leuk Esterase: Trace / RBC: 0-2 /HPF / WBC 0-2   Sq Epi: x / Non Sq Epi: Occasional / Bacteria: Occasional      Culture - Blood (collected 13 Aug 2018 13:41)  Source: .Blood None  Preliminary Report (14 Aug 2018 19:01):    No growth to date.    Culture - Blood (collected 13 Aug 2018 13:29)  Source: .Blood Blood-Peripheral  Preliminary Report (14 Aug 2018 19:01):    No growth to date.    Culture - Urine (collected 13 Aug 2018 13:27)  Source: .Urine Clean Catch (Midstream)  Final Report (14 Aug 2018 22:27):    No growth          Radiology: all available radiological tests reviewed    Advanced directives addressed: full resuscitation

## 2018-08-17 NOTE — PHYSICAL THERAPY INITIAL EVALUATION ADULT - IMPAIRMENTS FOUND, PT EVAL
ROM/aerobic capacity/endurance/integumentary integrity/muscle strength/anthropometric characteristics/circulation/gross motor

## 2018-08-17 NOTE — PHYSICAL THERAPY INITIAL EVALUATION ADULT - PATIENT PROFILE REVIEW, REHAB EVAL
yes pt previously hospitalized Jan 2018 with acute on chronic CHF,Fe deficient anemia ,and had EGD scheduled as outpatient/yes

## 2018-08-17 NOTE — PHYSICAL THERAPY INITIAL EVALUATION ADULT - PATIENT/FAMILY/SIGNIFICANT OTHER GOALS STATEMENT, PT EVAL
pt would prefer to be in less pain especially R posterior leg before mobilizing out of bed with PT,reports severe pain when R leg dependent

## 2018-08-17 NOTE — PROGRESS NOTE ADULT - SUBJECTIVE AND OBJECTIVE BOX
INTERVAL HPI/OVERNIGHT EVENTS: 67 y/o F PMHx Afib (on coumadin), CHF (unknown EF), DM, ulcerative colitis, HTN, PVD presents to ED sent in by vascular due to worsening B/L LE cellulitis and edema x2 weeks. Patient states she had been wearing russ boots to help with her LE edema and they were changed weekly. Patient states last week when boots were being changed, she had noticed increased redness on her RLE, but the boots were placed anyway. Patient began to experience pain and when boots were taken off, she noticed the RLE was severely discolored. Patient was placed on outpatient antibiotics by Dr. Rivera on Saturday, but her legs did not improve much and when she saw Dr. Rivera in office today, she was sent to ED for IV antibiotics. Patient denied any fevers, CP, palpitations, abd pain, N/V/D/C. Patient admitted to chills, sweats, SOB (not changed from baseline).    In ED, patient had leukocytosis, elevated lactate- 2.7 after NS bolus 500cc x2. CXR done showing no acute pathology.    8/14/18: Patient seen and examined at bedside. Complains of shooting pain in her R leg where the blisters are located, otherwise feels well. States SOB has improved, denies any CP, palpitations, fevers, chills.  8/15/18: Patient seen and examined at bedside. Patient complains of R leg pain, but states overall better. Denies any CP, SOB, fevers, chills.  8/16/18: Patient seen and examined at bedside. Complaining of stinging R leg pain, but denies all other complaints.  8/17/18: Patient seen and examined. States she felt dizzy this AM, better now. Still complaining of severe RLE pain where the blisters have opened up, received dilaudid 0.5mg IV last night which seemed to help a lot.    MEDICATIONS  (STANDING):  aspirin enteric coated 81 milliGRAM(s) Oral daily  budesonide  ER  tablet (Uceris) 9 milliGRAM(s) 9 milliGRAM(s) Oral <User Schedule>  cefepime   IVPB      cefepime   IVPB 2000 milliGRAM(s) IV Intermittent every 12 hours  dextrose 5%. 1000 milliLiter(s) (50 mL/Hr) IV Continuous <Continuous>  dextrose 50% Injectable 12.5 Gram(s) IV Push once  dextrose 50% Injectable 25 Gram(s) IV Push once  dextrose 50% Injectable 25 Gram(s) IV Push once  dicyclomine 10 milliGRAM(s) Oral daily  diltiazem    Tablet 120 milliGRAM(s) Oral two times a day  docusate sodium 100 milliGRAM(s) Oral three times a day  famotidine    Tablet 20 milliGRAM(s) Oral daily  ferrous    sulfate 325 milliGRAM(s) Oral daily  furosemide   Injectable 40 milliGRAM(s) IV Push daily  gabapentin 300 milliGRAM(s) Oral two times a day  insulin lispro (HumaLOG) corrective regimen sliding scale   SubCutaneous Before meals and at bedtime  metoprolol succinate  milliGRAM(s) Oral daily  montelukast 10 milliGRAM(s) Oral daily  potassium chloride    Tablet ER 20 milliEquivalent(s) Oral daily  vancomycin  IVPB 1750 milliGRAM(s) IV Intermittent every 12 hours  warfarin 5 milliGRAM(s) Oral once    MEDICATIONS  (PRN):  dextrose 40% Gel 15 Gram(s) Oral once PRN Blood Glucose LESS THAN 70 milliGRAM(s)/deciliter  glucagon  Injectable 1 milliGRAM(s) IntraMuscular once PRN Glucose LESS THAN 70 milligrams/deciliter  HYDROmorphone  Injectable 0.5 milliGRAM(s) IV Push every 6 hours PRN Severe Pain (7 - 10)  ibuprofen  Tablet 400 milliGRAM(s) Oral every 6 hours PRN fever- temp >100.4F  ondansetron Injectable 4 milliGRAM(s) IV Push every 6 hours PRN Nausea and/or Vomiting  oxyCODONE    IR 10 milliGRAM(s) Oral every 6 hours PRN Moderate Pain (4 - 6)      Allergies    penicillin (Hives)    Intolerances        ROS:  CONSTITUTIONAL: No weakness, fevers or chills  EYES/ENT: No visual changes;  No vertigo or throat pain   NECK: No pain or stiffness  RESPIRATORY: No cough, wheezing, hemoptysis; No shortness of breath  CARDIOVASCULAR: No chest pain or palpitations  GASTROINTESTINAL: No abdominal or epigastric pain. No nausea, vomiting, or hematemesis; No diarrhea or constipation.  GENITOURINARY: No dysuria, frequency or hematuria  NEUROLOGICAL: No numbness or weakness  SKIN: + cellulitis RLE  MSK: + R leg pain   All other review of systems is negative unless indicated above.    Vital Signs Last 24 Hrs  T(C): 36.7 (17 Aug 2018 11:41), Max: 36.9 (17 Aug 2018 09:41)  T(F): 98.1 (17 Aug 2018 11:41), Max: 98.5 (17 Aug 2018 09:41)  HR: 75 (17 Aug 2018 11:41) (67 - 94)  BP: 134/51 (17 Aug 2018 11:41) (134/51 - 160/72)  BP(mean): --  RR: 20 (17 Aug 2018 11:41) (18 - 20)  SpO2: 98% (17 Aug 2018 09:41) (94% - 98%)      Physical Exam:  General: WN/WD NAD  Neurology: A&Ox3, nonfocal, CAPPS x 4  Respiratory: CTA B/L  CV: irregular rate and rhythm, S1S2  Abdominal: Soft, NT, ND +BS, obese  Extremities: +erythema RLE with small puncture sites oozing purulent fluid, erythema improving. trace pitting edema B/L LE- improved    LABS:                        11.9   10.86 )-----------( 337      ( 17 Aug 2018 07:21 )             37.1     08-17    137  |  102  |  11  ----------------------------<  155<H>  3.7   |  28  |  0.55    Ca    8.6      17 Aug 2018 07:21    TPro  6.9  /  Alb  2.4<L>  /  TBili  0.6  /  DBili  x   /  AST  54<H>  /  ALT  111<H>  /  AlkPhos  197<H>  08-17    PT/INR - ( 17 Aug 2018 07:21 )   PT: 26.7 sec;   INR: 2.43 ratio               RADIOLOGY & ADDITIONAL TESTS:

## 2018-08-17 NOTE — PHYSICAL THERAPY INITIAL EVALUATION ADULT - PERTINENT HX OF CURRENT PROBLEM, REHAB EVAL
increased BLE edema,redness/erythema x 2 weeks ; pt with BLE edema being managed in UNNA boots bilat.noticed increased redness RLE when went for dressing change Sat 8/11 but was applied anyway; when removed RLE severely discolored,started on oral Abx ,now sent by Vascular Surgeon for IV Abx

## 2018-08-17 NOTE — PHYSICAL THERAPY INITIAL EVALUATION ADULT - ACTIVE RANGE OF MOTION EXAMINATION, REHAB EVAL
deficits as listed below/R ankle DF mod. limited and associated with pain posterior leg that "is like a shock"/bilateral upper extremity Active ROM was WFL (within functional limits)/bilateral  lower extremity Active ROM was WFL (within functional limits)

## 2018-08-17 NOTE — PROGRESS NOTE ADULT - ASSESSMENT
67 y/o female with h/o A.fib on coumadin, CHF (unknown EF), DM, ulcerative colitis, HTN, PVD was admitted on 8/13 after she was sent in by vascular due to worsening B/L LE cellulitis and edema x 2 weeks. Patient states she had been wearing russ boots to help with her LE edema and they were changed weekly. Patient states last week when boots were being changed, she had noticed increased redness on her RLE, but the boots were placed anyway. Patient began to experience pain and when boots were taken off, she noticed the RLE was severely discolored. Patient was placed on outpatient antibiotics by Dr. Rivera 3 days PTA, but her legs did not improve much and when she saw Dr. Rivera in office, she was sent to ED for IV antibiotics. Patient denied any fever or chills. In ER, she received vancomycin IV.    1. B/l lower extremities chronic stasis dermatitis with superimposed extensive RLE cellulitis. PVD. Allergy to PCN.   -right leg is improving slowly  -f/u BC x 2  -on vancomycin 1.750 gm IV q12h and cefepime 2 gm IV q12h # 4  -tolerating abx well so far; no side effects noted  -monitor closely in ej of PCN allergy history  -elevate legs  -continue IV abx coverage  -monitor temps  -f/u CBC  -supportive care  2. Other issues:   -care per medicine

## 2018-08-18 LAB
ALBUMIN SERPL ELPH-MCNC: 2.6 G/DL — LOW (ref 3.3–5)
ALP SERPL-CCNC: 210 U/L — HIGH (ref 40–120)
ALT FLD-CCNC: 138 U/L — HIGH (ref 12–78)
ANION GAP SERPL CALC-SCNC: 9 MMOL/L — SIGNIFICANT CHANGE UP (ref 5–17)
AST SERPL-CCNC: 35 U/L — SIGNIFICANT CHANGE UP (ref 15–37)
BILIRUB SERPL-MCNC: 0.4 MG/DL — SIGNIFICANT CHANGE UP (ref 0.2–1.2)
BUN SERPL-MCNC: 16 MG/DL — SIGNIFICANT CHANGE UP (ref 7–23)
CALCIUM SERPL-MCNC: 8.7 MG/DL — SIGNIFICANT CHANGE UP (ref 8.5–10.1)
CHLORIDE SERPL-SCNC: 102 MMOL/L — SIGNIFICANT CHANGE UP (ref 96–108)
CHOLEST SERPL-MCNC: 181 MG/DL — SIGNIFICANT CHANGE UP (ref 10–199)
CO2 SERPL-SCNC: 27 MMOL/L — SIGNIFICANT CHANGE UP (ref 22–31)
CREAT SERPL-MCNC: 0.54 MG/DL — SIGNIFICANT CHANGE UP (ref 0.5–1.3)
CULTURE RESULTS: SIGNIFICANT CHANGE UP
CULTURE RESULTS: SIGNIFICANT CHANGE UP
GLUCOSE BLDC GLUCOMTR-MCNC: 176 MG/DL — HIGH (ref 70–99)
GLUCOSE BLDC GLUCOMTR-MCNC: 191 MG/DL — HIGH (ref 70–99)
GLUCOSE BLDC GLUCOMTR-MCNC: 236 MG/DL — HIGH (ref 70–99)
GLUCOSE BLDC GLUCOMTR-MCNC: 265 MG/DL — HIGH (ref 70–99)
GLUCOSE SERPL-MCNC: 168 MG/DL — HIGH (ref 70–99)
HCT VFR BLD CALC: 39.6 % — SIGNIFICANT CHANGE UP (ref 34.5–45)
HDLC SERPL-MCNC: 69 MG/DL — SIGNIFICANT CHANGE UP
HGB BLD-MCNC: 12.7 G/DL — SIGNIFICANT CHANGE UP (ref 11.5–15.5)
INR BLD: 1.71 RATIO — HIGH (ref 0.88–1.16)
LIPID PNL WITH DIRECT LDL SERPL: 95 MG/DL — SIGNIFICANT CHANGE UP
MCHC RBC-ENTMCNC: 30.7 PG — SIGNIFICANT CHANGE UP (ref 27–34)
MCHC RBC-ENTMCNC: 32.1 GM/DL — SIGNIFICANT CHANGE UP (ref 32–36)
MCV RBC AUTO: 95.7 FL — SIGNIFICANT CHANGE UP (ref 80–100)
NRBC # BLD: 0 /100 WBCS — SIGNIFICANT CHANGE UP (ref 0–0)
PLATELET # BLD AUTO: 381 K/UL — SIGNIFICANT CHANGE UP (ref 150–400)
POTASSIUM SERPL-MCNC: 4.1 MMOL/L — SIGNIFICANT CHANGE UP (ref 3.5–5.3)
POTASSIUM SERPL-SCNC: 4.1 MMOL/L — SIGNIFICANT CHANGE UP (ref 3.5–5.3)
PROT SERPL-MCNC: 7.7 GM/DL — SIGNIFICANT CHANGE UP (ref 6–8.3)
PROTHROM AB SERPL-ACNC: 18.7 SEC — HIGH (ref 9.8–12.7)
RBC # BLD: 4.14 M/UL — SIGNIFICANT CHANGE UP (ref 3.8–5.2)
RBC # FLD: 15.3 % — HIGH (ref 10.3–14.5)
SODIUM SERPL-SCNC: 138 MMOL/L — SIGNIFICANT CHANGE UP (ref 135–145)
SPECIMEN SOURCE: SIGNIFICANT CHANGE UP
SPECIMEN SOURCE: SIGNIFICANT CHANGE UP
TOTAL CHOLESTEROL/HDL RATIO MEASUREMENT: 2.6 RATIO — LOW (ref 3.3–7.1)
TRIGL SERPL-MCNC: 86 MG/DL — SIGNIFICANT CHANGE UP (ref 10–149)
WBC # BLD: 11.75 K/UL — HIGH (ref 3.8–10.5)
WBC # FLD AUTO: 11.75 K/UL — HIGH (ref 3.8–10.5)

## 2018-08-18 PROCEDURE — 93010 ELECTROCARDIOGRAM REPORT: CPT

## 2018-08-18 RX ORDER — WARFARIN SODIUM 2.5 MG/1
5 TABLET ORAL ONCE
Qty: 0 | Refills: 0 | Status: COMPLETED | OUTPATIENT
Start: 2018-08-18 | End: 2018-08-18

## 2018-08-18 RX ORDER — FUROSEMIDE 40 MG
40 TABLET ORAL DAILY
Qty: 0 | Refills: 0 | Status: DISCONTINUED | OUTPATIENT
Start: 2018-08-19 | End: 2018-08-22

## 2018-08-18 RX ADMIN — Medication 40 MILLIGRAM(S): at 06:49

## 2018-08-18 RX ADMIN — CEFEPIME 100 MILLIGRAM(S): 1 INJECTION, POWDER, FOR SOLUTION INTRAMUSCULAR; INTRAVENOUS at 06:41

## 2018-08-18 RX ADMIN — Medication 81 MILLIGRAM(S): at 12:07

## 2018-08-18 RX ADMIN — HYDROMORPHONE HYDROCHLORIDE 0.5 MILLIGRAM(S): 2 INJECTION INTRAMUSCULAR; INTRAVENOUS; SUBCUTANEOUS at 22:50

## 2018-08-18 RX ADMIN — FAMOTIDINE 20 MILLIGRAM(S): 10 INJECTION INTRAVENOUS at 12:07

## 2018-08-18 RX ADMIN — Medication 1: at 17:50

## 2018-08-18 RX ADMIN — WARFARIN SODIUM 5 MILLIGRAM(S): 2.5 TABLET ORAL at 21:10

## 2018-08-18 RX ADMIN — Medication 250 MILLIGRAM(S): at 18:48

## 2018-08-18 RX ADMIN — Medication 10 MILLIGRAM(S): at 12:07

## 2018-08-18 RX ADMIN — OXYCODONE HYDROCHLORIDE 10 MILLIGRAM(S): 5 TABLET ORAL at 16:57

## 2018-08-18 RX ADMIN — MONTELUKAST 10 MILLIGRAM(S): 4 TABLET, CHEWABLE ORAL at 12:09

## 2018-08-18 RX ADMIN — OXYCODONE HYDROCHLORIDE 10 MILLIGRAM(S): 5 TABLET ORAL at 16:05

## 2018-08-18 RX ADMIN — HYDROMORPHONE HYDROCHLORIDE 0.5 MILLIGRAM(S): 2 INJECTION INTRAMUSCULAR; INTRAVENOUS; SUBCUTANEOUS at 22:21

## 2018-08-18 RX ADMIN — Medication 20 MILLIEQUIVALENT(S): at 12:09

## 2018-08-18 RX ADMIN — Medication 250 MILLIGRAM(S): at 06:41

## 2018-08-18 RX ADMIN — Medication 2: at 12:16

## 2018-08-18 RX ADMIN — CEFEPIME 100 MILLIGRAM(S): 1 INJECTION, POWDER, FOR SOLUTION INTRAMUSCULAR; INTRAVENOUS at 17:55

## 2018-08-18 RX ADMIN — Medication 325 MILLIGRAM(S): at 12:07

## 2018-08-18 RX ADMIN — OXYCODONE HYDROCHLORIDE 10 MILLIGRAM(S): 5 TABLET ORAL at 06:43

## 2018-08-18 RX ADMIN — GABAPENTIN 300 MILLIGRAM(S): 400 CAPSULE ORAL at 17:55

## 2018-08-18 RX ADMIN — Medication 1: at 08:25

## 2018-08-18 RX ADMIN — Medication 3: at 21:10

## 2018-08-18 RX ADMIN — GABAPENTIN 300 MILLIGRAM(S): 400 CAPSULE ORAL at 06:41

## 2018-08-18 RX ADMIN — OXYCODONE HYDROCHLORIDE 10 MILLIGRAM(S): 5 TABLET ORAL at 06:42

## 2018-08-18 RX ADMIN — Medication 100 MILLIGRAM(S): at 06:41

## 2018-08-18 NOTE — PROGRESS NOTE ADULT - SUBJECTIVE AND OBJECTIVE BOX
Date of service: 18 @ 13:01    Lying in bed in NAD  Weak looking  Has right lower leg pain  Swelling and redness are down    ROS: no fever or chills; denies dizziness, no HA, no SOB or cough, no abdominal pain, no diarrhea or constipation; no dysuria    MEDICATIONS  (STANDING):  aspirin enteric coated 81 milliGRAM(s) Oral daily  budesonide  ER  tablet (Uceris) 9 milliGRAM(s) 9 milliGRAM(s) Oral <User Schedule>  cefepime   IVPB      cefepime   IVPB 2000 milliGRAM(s) IV Intermittent every 12 hours  dextrose 5%. 1000 milliLiter(s) (50 mL/Hr) IV Continuous <Continuous>  dextrose 50% Injectable 12.5 Gram(s) IV Push once  dextrose 50% Injectable 25 Gram(s) IV Push once  dextrose 50% Injectable 25 Gram(s) IV Push once  dicyclomine 10 milliGRAM(s) Oral daily  diltiazem    Tablet 120 milliGRAM(s) Oral two times a day  docusate sodium 100 milliGRAM(s) Oral three times a day  famotidine    Tablet 20 milliGRAM(s) Oral daily  ferrous    sulfate 325 milliGRAM(s) Oral daily  gabapentin 300 milliGRAM(s) Oral two times a day  insulin lispro (HumaLOG) corrective regimen sliding scale   SubCutaneous Before meals and at bedtime  metoprolol succinate  milliGRAM(s) Oral daily  montelukast 10 milliGRAM(s) Oral daily  potassium chloride    Tablet ER 20 milliEquivalent(s) Oral daily  vancomycin  IVPB 1750 milliGRAM(s) IV Intermittent every 12 hours  warfarin 5 milliGRAM(s) Oral once      Vital Signs Last 24 Hrs  T(C): 36.7 (18 Aug 2018 11:22), Max: 36.8 (17 Aug 2018 21:30)  T(F): 98 (18 Aug 2018 11:22), Max: 98.2 (17 Aug 2018 21:30)  HR: 66 (18 Aug 2018 11:22) (66 - 86)  BP: 114/62 (18 Aug 2018 11:22) (114/62 - 156/82)  BP(mean): --  RR: 18 (18 Aug 2018 11:22) (18 - 19)  SpO2: 98% (18 Aug 2018 11:22) (95% - 98%)    Physical Exam:      Constitutional: frail looking  HEENT: NC/AT, EOMI, PERRLA, conjunctivae clear  Neck: supple; thyroid not palpable  Back: no tenderness  Respiratory: respiratory effort normal; clear to auscultation  Cardiovascular: S1S2 regular, no murmurs  Abdomen: soft, not tender, not distended, positive BS  Genitourinary: no suprapubic tenderness  Lymphatic: no LN palpable  Musculoskeletal: no muscle tenderness, no joint swelling or tenderness  Extremities: right leg erythema and edema from ankle to below the knee - much improved; skin is less tense; few broken skin areas - improving slowly  Neurological/ Psychiatric: AxOx3, moving all extremities  Skin: no rashes; no palpable lesions    Labs: reviewed                        12.7   11.75 )-----------( 381      ( 18 Aug 2018 07:47 )             39.6     -18    138  |  102  |  16  ----------------------------<  168<H>  4.1   |  27  |  0.54    Ca    8.7      18 Aug 2018 07:47    TPro  7.7  /  Alb  2.6<L>  /  TBili  0.4  /  DBili  x   /  AST  35  /  ALT  138<H>  /  AlkPhos  210<H>        Vancomycin Level, Trough: 10.6 ug/mL ( @ 16:42)                          12.2   9.98  )-----------( 366      ( 14 Aug 2018 07:33 )             37.9     -14    138  |  103  |  9   ----------------------------<  167<H>  3.7   |  25  |  0.59    Ca    8.2<L>      14 Aug 2018 07:33  Phos  3.2     08-14  Mg     1.6     -    TPro  7.5  /  Alb  2.6<L>  /  TBili  1.0  /  DBili  x   /  AST  218<H>  /  ALT  237<H>  /  AlkPhos  190<H>       LIVER FUNCTIONS - ( 14 Aug 2018 07:33 )  Alb: 2.6 g/dL / Pro: 7.5 gm/dL / ALK PHOS: 190 U/L / ALT: 237 U/L / AST: 218 U/L / GGT: x           Urinalysis Basic - ( 13 Aug 2018 13:27 )    Color: Yellow / Appearance: Clear / S.020 / pH: x  Gluc: x / Ketone: Negative  / Bili: Negative / Urobili: 1 mg/dL   Blood: x / Protein: Negative mg/dL / Nitrite: Negative   Leuk Esterase: Trace / RBC: 0-2 /HPF / WBC 0-2   Sq Epi: x / Non Sq Epi: Occasional / Bacteria: Occasional      Culture - Blood (collected 13 Aug 2018 13:41)  Source: .Blood None  Preliminary Report (14 Aug 2018 19:01):    No growth to date.    Culture - Blood (collected 13 Aug 2018 13:29)  Source: .Blood Blood-Peripheral  Preliminary Report (14 Aug 2018 19:01):    No growth to date.    Culture - Urine (collected 13 Aug 2018 13:27)  Source: .Urine Clean Catch (Midstream)  Final Report (14 Aug 2018 22:27):    No growth          Radiology: all available radiological tests reviewed    Advanced directives addressed: full resuscitation

## 2018-08-18 NOTE — PROGRESS NOTE ADULT - ASSESSMENT
67 y/o female with h/o A.fib on coumadin, CHF (unknown EF), DM, ulcerative colitis, HTN, PVD was admitted on 8/13 after she was sent in by vascular due to worsening B/L LE cellulitis and edema x 2 weeks. Patient states she had been wearing russ boots to help with her LE edema and they were changed weekly. Patient states last week when boots were being changed, she had noticed increased redness on her RLE, but the boots were placed anyway. Patient began to experience pain and when boots were taken off, she noticed the RLE was severely discolored. Patient was placed on outpatient antibiotics by Dr. Rivera 3 days PTA, but her legs did not improve much and when she saw Dr. Rivera in office, she was sent to ED for IV antibiotics. Patient denied any fever or chills. In ER, she received vancomycin IV.    1. B/l lower extremities chronic stasis dermatitis with superimposed extensive RLE cellulitis. PVD. Allergy to PCN.   -right leg is improving slowly  -f/u BC x 2  -on vancomycin 1.750 gm IV q12h and cefepime 2 gm IV q12h # 5  -tolerating abx well so far; no side effects noted  -monitor closely in ej of PCN allergy history  -elevate legs  -continue IV abx coverage  -monitor temps  -f/u CBC  -supportive care  2. Other issues:   -care per medicine

## 2018-08-18 NOTE — PROGRESS NOTE ADULT - SUBJECTIVE AND OBJECTIVE BOX
cc: leg pain  hpi: 68y female w/ PMH Afib on coumadin, T2D, UC, PVC sent in vy vascular b/c worsening LE redness/edema x 2 weeks.  Pt was wearing Unna boots outpt and noticed discoloration.   8/18- chart reviewed.     Legs less painful today.  c/o RLE blisters still opening up and draining.  Ambulating to restroom w/ walker.     ros- as per hpi above, otherwise 10 point ros neg    Vital Signs Last 24 Hrs  T(C): 36.4 (18 Aug 2018 05:06), Max: 36.8 (17 Aug 2018 21:30)  T(F): 97.6 (18 Aug 2018 05:06), Max: 98.2 (17 Aug 2018 21:30)  HR: 86 (18 Aug 2018 05:06) (74 - 86)  BP: 156/82 (18 Aug 2018 05:06) (123/54 - 156/82)  BP(mean): --  RR: 18 (18 Aug 2018 05:06) (18 - 20)  SpO2: 97% (18 Aug 2018 05:06) (95% - 97%)      PHYSICAL EXAM:  General: NAD, comfortable  Neuro: AAOx3, no focal deficits  HEENT: NCAT, MMM  Neck: Soft and supple, No JVD  Respiratory: CTA b/l, no w/r/r  Cardiovascular: S1 and S2, RRR  Gastrointestinal: +BS, soft, NTND, obese  Extremities: b/l LE edema  R>L;  RLE w/ few blisters w/ purulent drainage; warm to touch, tender to palpation  Musculoskeletal: moving all extremities        LABS: All Labs Reviewed:                        12.7   11.75 )-----------( 381      ( 18 Aug 2018 07:47 )             39.6     08-18    138  |  102  |  16  ----------------------------<  168<H>  4.1   |  27  |  0.54    Ca    8.7      18 Aug 2018 07:47    TPro  7.7  /  Alb  2.6<L>  /  TBili  0.4  /  DBili  x   /  AST  35  /  ALT  138<H>  /  AlkPhos  210<H>  08-18    PT/INR - ( 17 Aug 2018 07:21 )   PT: 26.7 sec;   INR: 2.43 ratio       Culture - Blood (08.13.18 @ 13:41)    Specimen Source: .Blood None    Culture Results:   No growth to date.    < from: US Abdomen Complete (08.15.18 @ 10:18) >  IMPRESSION: Enlarged fatty liver. Nonspecific echogenic lesion within the   right hepatic lobe measuring 3.6 x 3.7 x 3.3 cm and a nonspecific   echogenic lesion in the left hepatic lobe measuring 1.1 x 1.7 x 1.1 cm,   not visualized on the prior ultrasound, for which further evaluation with   an MRI is recommended.    CBD is dilated measuring up to 1.1 cm, new since prior ultrasound of 2016.    < end of copied text >    MEDICATIONS  (STANDING):  aspirin enteric coated 81 milliGRAM(s) Oral daily  budesonide  ER  tablet (Uceris) 9 milliGRAM(s) 9 milliGRAM(s) Oral <User Schedule>  cefepime   IVPB      cefepime   IVPB 2000 milliGRAM(s) IV Intermittent every 12 hours  dextrose 5%. 1000 milliLiter(s) (50 mL/Hr) IV Continuous <Continuous>  dextrose 50% Injectable 12.5 Gram(s) IV Push once  dextrose 50% Injectable 25 Gram(s) IV Push once  dextrose 50% Injectable 25 Gram(s) IV Push once  dicyclomine 10 milliGRAM(s) Oral daily  diltiazem    Tablet 120 milliGRAM(s) Oral two times a day  docusate sodium 100 milliGRAM(s) Oral three times a day  famotidine    Tablet 20 milliGRAM(s) Oral daily  ferrous    sulfate 325 milliGRAM(s) Oral daily  furosemide   Injectable 40 milliGRAM(s) IV Push daily  gabapentin 300 milliGRAM(s) Oral two times a day  insulin lispro (HumaLOG) corrective regimen sliding scale   SubCutaneous Before meals and at bedtime  metoprolol succinate  milliGRAM(s) Oral daily  montelukast 10 milliGRAM(s) Oral daily  potassium chloride    Tablet ER 20 milliEquivalent(s) Oral daily  vancomycin  IVPB 1750 milliGRAM(s) IV Intermittent every 12 hours    MEDICATIONS  (PRN):  dextrose 40% Gel 15 Gram(s) Oral once PRN Blood Glucose LESS THAN 70 milliGRAM(s)/deciliter  glucagon  Injectable 1 milliGRAM(s) IntraMuscular once PRN Glucose LESS THAN 70 milligrams/deciliter  HYDROmorphone  Injectable 0.5 milliGRAM(s) IV Push every 6 hours PRN Severe Pain (7 - 10)  ibuprofen  Tablet 400 milliGRAM(s) Oral every 6 hours PRN fever- temp >100.4F  ondansetron Injectable 4 milliGRAM(s) IV Push every 6 hours PRN Nausea and/or Vomiting  oxyCODONE    IR 10 milliGRAM(s) Oral every 6 hours PRN Moderate Pain (4 - 6)      Assessment and Plan:   68y female w/     *b/l LE cellulitis,  chronic venous insufficiency  - IV vanco, cefepime  - cultures no growth  - still w/ leukocytosis; afebrile  - pain control  - ID f/u appreciated  - PT eval     *PAF  - rate controlled on CCB, BB  - INR goal 2-3 on coumadin  need INR today    *chronic diastolic CHF  - change lasix to po , monitor I/O  - low Na diet  - cxr unremarkable, no bnp, good O2 sats-  f/u outpt w/ Cardio    *abnormal LFTs  ast and alk phos remain elevated  - US abd  - fatty liver, nonspecific lesion right lobe and left lobe not seen on prior ultrasound and  CBD dilated new from 2016   - normal TB  - trend lfts, check lipid panel , will need close GI follow up outpt and MRI    *dvt px  - on coumadin

## 2018-08-19 LAB
ANION GAP SERPL CALC-SCNC: 5 MMOL/L — SIGNIFICANT CHANGE UP (ref 5–17)
BUN SERPL-MCNC: 13 MG/DL — SIGNIFICANT CHANGE UP (ref 7–23)
CALCIUM SERPL-MCNC: 8.4 MG/DL — LOW (ref 8.5–10.1)
CHLORIDE SERPL-SCNC: 103 MMOL/L — SIGNIFICANT CHANGE UP (ref 96–108)
CO2 SERPL-SCNC: 30 MMOL/L — SIGNIFICANT CHANGE UP (ref 22–31)
CREAT SERPL-MCNC: 0.49 MG/DL — LOW (ref 0.5–1.3)
GLUCOSE BLDC GLUCOMTR-MCNC: 156 MG/DL — HIGH (ref 70–99)
GLUCOSE BLDC GLUCOMTR-MCNC: 179 MG/DL — HIGH (ref 70–99)
GLUCOSE BLDC GLUCOMTR-MCNC: 199 MG/DL — HIGH (ref 70–99)
GLUCOSE BLDC GLUCOMTR-MCNC: 230 MG/DL — HIGH (ref 70–99)
GLUCOSE SERPL-MCNC: 146 MG/DL — HIGH (ref 70–99)
HCT VFR BLD CALC: 37.4 % — SIGNIFICANT CHANGE UP (ref 34.5–45)
HGB BLD-MCNC: 11.8 G/DL — SIGNIFICANT CHANGE UP (ref 11.5–15.5)
INR BLD: 1.6 RATIO — HIGH (ref 0.88–1.16)
MCHC RBC-ENTMCNC: 30.2 PG — SIGNIFICANT CHANGE UP (ref 27–34)
MCHC RBC-ENTMCNC: 31.6 GM/DL — LOW (ref 32–36)
MCV RBC AUTO: 95.7 FL — SIGNIFICANT CHANGE UP (ref 80–100)
NRBC # BLD: 0 /100 WBCS — SIGNIFICANT CHANGE UP (ref 0–0)
PLATELET # BLD AUTO: 354 K/UL — SIGNIFICANT CHANGE UP (ref 150–400)
POTASSIUM SERPL-MCNC: 3.9 MMOL/L — SIGNIFICANT CHANGE UP (ref 3.5–5.3)
POTASSIUM SERPL-SCNC: 3.9 MMOL/L — SIGNIFICANT CHANGE UP (ref 3.5–5.3)
PROTHROM AB SERPL-ACNC: 17.4 SEC — HIGH (ref 9.8–12.7)
RBC # BLD: 3.91 M/UL — SIGNIFICANT CHANGE UP (ref 3.8–5.2)
RBC # FLD: 15 % — HIGH (ref 10.3–14.5)
SODIUM SERPL-SCNC: 138 MMOL/L — SIGNIFICANT CHANGE UP (ref 135–145)
WBC # BLD: 11.83 K/UL — HIGH (ref 3.8–10.5)
WBC # FLD AUTO: 11.83 K/UL — HIGH (ref 3.8–10.5)

## 2018-08-19 RX ORDER — WARFARIN SODIUM 2.5 MG/1
7.5 TABLET ORAL ONCE
Qty: 0 | Refills: 0 | Status: COMPLETED | OUTPATIENT
Start: 2018-08-19 | End: 2018-08-19

## 2018-08-19 RX ORDER — GABAPENTIN 400 MG/1
300 CAPSULE ORAL THREE TIMES A DAY
Qty: 0 | Refills: 0 | Status: DISCONTINUED | OUTPATIENT
Start: 2018-08-19 | End: 2018-08-22

## 2018-08-19 RX ADMIN — HYDROMORPHONE HYDROCHLORIDE 0.5 MILLIGRAM(S): 2 INJECTION INTRAMUSCULAR; INTRAVENOUS; SUBCUTANEOUS at 21:19

## 2018-08-19 RX ADMIN — GABAPENTIN 300 MILLIGRAM(S): 400 CAPSULE ORAL at 06:06

## 2018-08-19 RX ADMIN — Medication 100 MILLIGRAM(S): at 06:05

## 2018-08-19 RX ADMIN — Medication 100 MILLIGRAM(S): at 13:38

## 2018-08-19 RX ADMIN — Medication 100 MILLIGRAM(S): at 06:07

## 2018-08-19 RX ADMIN — MONTELUKAST 10 MILLIGRAM(S): 4 TABLET, CHEWABLE ORAL at 11:24

## 2018-08-19 RX ADMIN — CEFEPIME 100 MILLIGRAM(S): 1 INJECTION, POWDER, FOR SOLUTION INTRAMUSCULAR; INTRAVENOUS at 05:57

## 2018-08-19 RX ADMIN — HYDROMORPHONE HYDROCHLORIDE 0.5 MILLIGRAM(S): 2 INJECTION INTRAMUSCULAR; INTRAVENOUS; SUBCUTANEOUS at 05:47

## 2018-08-19 RX ADMIN — Medication 1: at 18:20

## 2018-08-19 RX ADMIN — CEFEPIME 100 MILLIGRAM(S): 1 INJECTION, POWDER, FOR SOLUTION INTRAMUSCULAR; INTRAVENOUS at 17:47

## 2018-08-19 RX ADMIN — Medication 250 MILLIGRAM(S): at 05:57

## 2018-08-19 RX ADMIN — WARFARIN SODIUM 7.5 MILLIGRAM(S): 2.5 TABLET ORAL at 21:18

## 2018-08-19 RX ADMIN — Medication 10 MILLIGRAM(S): at 11:21

## 2018-08-19 RX ADMIN — FAMOTIDINE 20 MILLIGRAM(S): 10 INJECTION INTRAVENOUS at 11:21

## 2018-08-19 RX ADMIN — Medication 1: at 10:26

## 2018-08-19 RX ADMIN — Medication 250 MILLIGRAM(S): at 17:50

## 2018-08-19 RX ADMIN — Medication 20 MILLIEQUIVALENT(S): at 11:20

## 2018-08-19 RX ADMIN — Medication 1: at 13:38

## 2018-08-19 RX ADMIN — Medication 2: at 21:20

## 2018-08-19 RX ADMIN — Medication 81 MILLIGRAM(S): at 11:19

## 2018-08-19 RX ADMIN — OXYCODONE HYDROCHLORIDE 10 MILLIGRAM(S): 5 TABLET ORAL at 11:18

## 2018-08-19 RX ADMIN — Medication 40 MILLIGRAM(S): at 06:05

## 2018-08-19 RX ADMIN — OXYCODONE HYDROCHLORIDE 10 MILLIGRAM(S): 5 TABLET ORAL at 17:45

## 2018-08-19 RX ADMIN — Medication 325 MILLIGRAM(S): at 11:20

## 2018-08-19 RX ADMIN — GABAPENTIN 300 MILLIGRAM(S): 400 CAPSULE ORAL at 21:18

## 2018-08-19 RX ADMIN — HYDROMORPHONE HYDROCHLORIDE 0.5 MILLIGRAM(S): 2 INJECTION INTRAMUSCULAR; INTRAVENOUS; SUBCUTANEOUS at 21:21

## 2018-08-19 NOTE — PROGRESS NOTE ADULT - SUBJECTIVE AND OBJECTIVE BOX
cc: leg pain  hpi: 68y female w/ PMH Afib on coumadin, T2D, UC, PVC sent in vy vascular b/c worsening LE redness/edema x 2 weeks.  Pt was wearing Unna boots outpt and noticed discoloration.   8/18- chart reviewed.   Legs less painful today.  c/o RLE blisters still opening up and draining.  Ambulating to restroom w/ walker.   8/19-  still having intermittent leg pain R>L.      ros- as per hpi above, otherwise 10 point ros neg    Vital Signs Last 24 Hrs  T(C): 36.7 (19 Aug 2018 11:46), Max: 37 (18 Aug 2018 17:45)  T(F): 98 (19 Aug 2018 11:46), Max: 98.6 (18 Aug 2018 17:45)  HR: 62 (19 Aug 2018 11:46) (62 - 87)  BP: 134/54 (19 Aug 2018 11:46) (134/54 - 160/78)  BP(mean): --  RR: 18 (19 Aug 2018 11:46) (17 - 18)  SpO2: 97% (19 Aug 2018 11:46) (96% - 97%)    PHYSICAL EXAM:  General: NAD, comfortable  Neuro: AAOx3, no focal deficits  HEENT: NCAT, MMM  Neck: Soft and supple, No JVD  Respiratory: CTA b/l, no w/r/r  Cardiovascular: S1 and S2, RRR  Gastrointestinal: +BS, soft, NTND, obese  Extremities: b/l LE edema  R>L;  RLE w/ few blisters w/ purulent drainage; warm to touch, tender to palpation  Musculoskeletal: moving all extremities        LABS: All Labs Reviewed:                        11.8 11.83 )-----------( 354      ( 19 Aug 2018 07:06 )             37.4     08-19    138  |  103  |  13  ----------------------------<  146<H>  3.9   |  30  |  0.49<L>    Ca    8.4<L>      19 Aug 2018 07:06    TPro  7.7  /  Alb  2.6<L>  /  TBili  0.4  /  DBili  x   /  AST  35  /  ALT  138<H>  /  AlkPhos  210<H>  08-18    PT/INR - ( 19 Aug 2018 07:06 )   PT: 17.4 sec;   INR: 1.60 ratio                                   12.7   11.75 )-----------( 381      ( 18 Aug 2018 07:47 )             39.6     08-18    138  |  102  |  16  ----------------------------<  168<H>  4.1   |  27  |  0.54    Ca    8.7      18 Aug 2018 07:47    TPro  7.7  /  Alb  2.6<L>  /  TBili  0.4  /  DBili  x   /  AST  35  /  ALT  138<H>  /  AlkPhos  210<H>  08-18    PT/INR - ( 17 Aug 2018 07:21 )   PT: 26.7 sec;   INR: 2.43 ratio       Culture - Blood (08.13.18 @ 13:41)    Specimen Source: .Blood None    Culture Results:   No growth to date.    < from: US Abdomen Complete (08.15.18 @ 10:18) >  IMPRESSION: Enlarged fatty liver. Nonspecific echogenic lesion within the   right hepatic lobe measuring 3.6 x 3.7 x 3.3 cm and a nonspecific   echogenic lesion in the left hepatic lobe measuring 1.1 x 1.7 x 1.1 cm,   not visualized on the prior ultrasound, for which further evaluation with   an MRI is recommended.    CBD is dilated measuring up to 1.1 cm, new since prior ultrasound of 2016.    < end of copied text >    MEDICATIONS  (STANDING):  aspirin enteric coated 81 milliGRAM(s) Oral daily  budesonide  ER  tablet (Uceris) 9 milliGRAM(s) 9 milliGRAM(s) Oral <User Schedule>  cefepime   IVPB      cefepime   IVPB 2000 milliGRAM(s) IV Intermittent every 12 hours  dextrose 5%. 1000 milliLiter(s) (50 mL/Hr) IV Continuous <Continuous>  dextrose 50% Injectable 12.5 Gram(s) IV Push once  dextrose 50% Injectable 25 Gram(s) IV Push once  dextrose 50% Injectable 25 Gram(s) IV Push once  dicyclomine 10 milliGRAM(s) Oral daily  diltiazem    Tablet 120 milliGRAM(s) Oral two times a day  docusate sodium 100 milliGRAM(s) Oral three times a day  famotidine    Tablet 20 milliGRAM(s) Oral daily  ferrous    sulfate 325 milliGRAM(s) Oral daily  furosemide    Tablet 40 milliGRAM(s) Oral daily  gabapentin 300 milliGRAM(s) Oral three times a day  insulin lispro (HumaLOG) corrective regimen sliding scale   SubCutaneous Before meals and at bedtime  metoprolol succinate  milliGRAM(s) Oral daily  montelukast 10 milliGRAM(s) Oral daily  potassium chloride    Tablet ER 20 milliEquivalent(s) Oral daily  vancomycin  IVPB 1750 milliGRAM(s) IV Intermittent every 12 hours  warfarin 7.5 milliGRAM(s) Oral once    MEDICATIONS  (PRN):  dextrose 40% Gel 15 Gram(s) Oral once PRN Blood Glucose LESS THAN 70 milliGRAM(s)/deciliter  glucagon  Injectable 1 milliGRAM(s) IntraMuscular once PRN Glucose LESS THAN 70 milligrams/deciliter  HYDROmorphone  Injectable 0.5 milliGRAM(s) IV Push every 6 hours PRN Severe Pain (7 - 10)  ibuprofen  Tablet 400 milliGRAM(s) Oral every 6 hours PRN fever- temp >100.4F  ondansetron Injectable 4 milliGRAM(s) IV Push every 6 hours PRN Nausea and/or Vomiting  oxyCODONE    IR 10 milliGRAM(s) Oral every 6 hours PRN Moderate Pain (4 - 6)        Assessment and Plan:   68y female w/     *b/l LE cellulitis,  chronic venous insufficiency  - IV vanco, cefepime day 6  - cultures no growth  - still w/ leukocytosis; afebrile  - pain control  - ID f/u appreciated  - PT eval     *PAF  - rate controlled on CCB, BB  - INR goal 2-3 on coumadin--> increase coumadin tonight     *chronic diastolic CHF  - change lasix to po , monitor I/O  - low Na diet  - cxr unremarkable, no bnp, good O2 sats-  f/u outpt w/ Cardio    *abnormal LFTs  ast and alk phos remain elevated  - US abd  - fatty liver, nonspecific lesion right lobe and left lobe not seen on prior ultrasound and  CBD dilated new from 2016   - normal TB  - trend lfts, check lipid panel- normal   - will need close GI follow up outpt and MRI    *dvt px  - on coumadin

## 2018-08-19 NOTE — PROGRESS NOTE ADULT - ASSESSMENT
69 y/o female with h/o A.fib on coumadin, CHF (unknown EF), DM, ulcerative colitis, HTN, PVD was admitted on 8/13 after she was sent in by vascular due to worsening B/L LE cellulitis and edema x 2 weeks. Patient states she had been wearing russ boots to help with her LE edema and they were changed weekly. Patient states last week when boots were being changed, she had noticed increased redness on her RLE, but the boots were placed anyway. Patient began to experience pain and when boots were taken off, she noticed the RLE was severely discolored. Patient was placed on outpatient antibiotics by Dr. Rivera 3 days PTA, but her legs did not improve much and when she saw Dr. Rivera in office, she was sent to ED for IV antibiotics. Patient denied any fever or chills. In ER, she received vancomycin IV.    1. B/l lower extremities chronic stasis dermatitis with superimposed extensive RLE cellulitis. PVD. Allergy to PCN.   -right leg is improving slowly  -f/u BC x 2  -on vancomycin 1.750 gm IV q12h and cefepime 2 gm IV q12h # 6  -tolerating abx well so far; no side effects noted  -monitor closely in ej of PCN allergy history  -vancomycin level is theraputic  -elevate legs  -continue IV abx coverage  -monitor temps  -f/u CBC  -supportive care  2. Other issues:   -care per medicine

## 2018-08-19 NOTE — PROGRESS NOTE ADULT - SUBJECTIVE AND OBJECTIVE BOX
Date of service: 18 @ 12:14    Developed fluid filled blisters on right calf area  Has burning pain on right calf    ROS: no fever or chills; denies dizziness, no HA, no SOB or cough, no abdominal pain, no diarrhea or constipation; no dysuria  MEDICATIONS  (STANDING):  aspirin enteric coated 81 milliGRAM(s) Oral daily  budesonide  ER  tablet (Uceris) 9 milliGRAM(s) 9 milliGRAM(s) Oral <User Schedule>  cefepime   IVPB      cefepime   IVPB 2000 milliGRAM(s) IV Intermittent every 12 hours  dextrose 5%. 1000 milliLiter(s) (50 mL/Hr) IV Continuous <Continuous>  dextrose 50% Injectable 12.5 Gram(s) IV Push once  dextrose 50% Injectable 25 Gram(s) IV Push once  dextrose 50% Injectable 25 Gram(s) IV Push once  dicyclomine 10 milliGRAM(s) Oral daily  diltiazem    Tablet 120 milliGRAM(s) Oral two times a day  docusate sodium 100 milliGRAM(s) Oral three times a day  famotidine    Tablet 20 milliGRAM(s) Oral daily  ferrous    sulfate 325 milliGRAM(s) Oral daily  furosemide    Tablet 40 milliGRAM(s) Oral daily  gabapentin 300 milliGRAM(s) Oral two times a day  insulin lispro (HumaLOG) corrective regimen sliding scale   SubCutaneous Before meals and at bedtime  metoprolol succinate  milliGRAM(s) Oral daily  montelukast 10 milliGRAM(s) Oral daily  potassium chloride    Tablet ER 20 milliEquivalent(s) Oral daily  vancomycin  IVPB 1750 milliGRAM(s) IV Intermittent every 12 hours  warfarin 7.5 milliGRAM(s) Oral once      Vital Signs Last 24 Hrs  T(C): 36.7 (19 Aug 2018 11:46), Max: 37 (18 Aug 2018 17:45)  T(F): 98 (19 Aug 2018 11:46), Max: 98.6 (18 Aug 2018 17:45)  HR: 62 (19 Aug 2018 11:46) (62 - 87)  BP: 134/54 (19 Aug 2018 11:46) (134/54 - 160/78)  BP(mean): --  RR: 18 (19 Aug 2018 11:46) (17 - 18)  SpO2: 97% (19 Aug 2018 11:46) (96% - 97%)    Physical Exam:      Constitutional: frail looking  HEENT: NC/AT, EOMI, PERRLA, conjunctivae clear  Neck: supple; thyroid not palpable  Back: no tenderness  Respiratory: respiratory effort normal; clear to auscultation  Cardiovascular: S1S2 regular, no murmurs  Abdomen: soft, not tender, not distended, positive BS  Genitourinary: no suprapubic tenderness  Lymphatic: no LN palpable  Musculoskeletal: no muscle tenderness, no joint swelling or tenderness  Extremities: right leg erythema and edema from ankle to below the knee - much improved; skin is less tense; few dry ulcers; several small blisters on right calf  Neurological/ Psychiatric: AxOx3, moving all extremities  Skin: no rashes; no palpable lesions    Labs: reviewed                        11.8   11.83 )-----------( 354      ( 19 Aug 2018 07:06 )             37.4         138  |  103  |  13  ----------------------------<  146<H>  3.9   |  30  |  0.49<L>    Ca    8.4<L>      19 Aug 2018 07:06    TPro  7.7  /  Alb  2.6<L>  /  TBili  0.4  /  DBili  x   /  AST  35  /  ALT  138<H>  /  AlkPhos  210<H>        Vancomycin Level, Trough: 10.6 ug/mL ( @ 16:42)                        12.7   11.75 )-----------( 381      ( 18 Aug 2018 07:47 )             39.6         138  |  102  |  16  ----------------------------<  168<H>  4.1   |  27  |  0.54    Ca    8.7      18 Aug 2018 07:47    TPro  7.7  /  Alb  2.6<L>  /  TBili  0.4  /  DBili  x   /  AST  35  /  ALT  138<H>  /  AlkPhos  210<H>        Vancomycin Level, Trough: 10.6 ug/mL ( @ 16:42)                          12.2   9.98  )-----------( 366      ( 14 Aug 2018 07:33 )             37.9     08-14    138  |  103  |  9   ----------------------------<  167<H>  3.7   |  25  |  0.59    Ca    8.2<L>      14 Aug 2018 07:33  Phos  3.2       Mg     1.6         TPro  7.5  /  Alb  2.6<L>  /  TBili  1.0  /  DBili  x   /  AST  218<H>  /  ALT  237<H>  /  AlkPhos  190<H>       LIVER FUNCTIONS - ( 14 Aug 2018 07:33 )  Alb: 2.6 g/dL / Pro: 7.5 gm/dL / ALK PHOS: 190 U/L / ALT: 237 U/L / AST: 218 U/L / GGT: x           Urinalysis Basic - ( 13 Aug 2018 13:27 )    Color: Yellow / Appearance: Clear / S.020 / pH: x  Gluc: x / Ketone: Negative  / Bili: Negative / Urobili: 1 mg/dL   Blood: x / Protein: Negative mg/dL / Nitrite: Negative   Leuk Esterase: Trace / RBC: 0-2 /HPF / WBC 0-2   Sq Epi: x / Non Sq Epi: Occasional / Bacteria: Occasional      Culture - Blood (collected 13 Aug 2018 13:41)  Source: .Blood None  Preliminary Report (14 Aug 2018 19:01):    No growth to date.    Culture - Blood (collected 13 Aug 2018 13:29)  Source: .Blood Blood-Peripheral  Preliminary Report (14 Aug 2018 19:01):    No growth to date.    Culture - Urine (collected 13 Aug 2018 13:27)  Source: .Urine Clean Catch (Midstream)  Final Report (14 Aug 2018 22:27):    No growth          Radiology: all available radiological tests reviewed    Advanced directives addressed: full resuscitation

## 2018-08-20 LAB
ALBUMIN SERPL ELPH-MCNC: 2.5 G/DL — LOW (ref 3.3–5)
ALP SERPL-CCNC: 191 U/L — HIGH (ref 40–120)
ALT FLD-CCNC: 100 U/L — HIGH (ref 12–78)
ANION GAP SERPL CALC-SCNC: 8 MMOL/L — SIGNIFICANT CHANGE UP (ref 5–17)
AST SERPL-CCNC: 76 U/L — HIGH (ref 15–37)
BILIRUB SERPL-MCNC: 0.6 MG/DL — SIGNIFICANT CHANGE UP (ref 0.2–1.2)
BUN SERPL-MCNC: 13 MG/DL — SIGNIFICANT CHANGE UP (ref 7–23)
CALCIUM SERPL-MCNC: 8.6 MG/DL — SIGNIFICANT CHANGE UP (ref 8.5–10.1)
CHLORIDE SERPL-SCNC: 102 MMOL/L — SIGNIFICANT CHANGE UP (ref 96–108)
CO2 SERPL-SCNC: 29 MMOL/L — SIGNIFICANT CHANGE UP (ref 22–31)
CREAT SERPL-MCNC: 0.56 MG/DL — SIGNIFICANT CHANGE UP (ref 0.5–1.3)
GLUCOSE BLDC GLUCOMTR-MCNC: 159 MG/DL — HIGH (ref 70–99)
GLUCOSE BLDC GLUCOMTR-MCNC: 160 MG/DL — HIGH (ref 70–99)
GLUCOSE BLDC GLUCOMTR-MCNC: 186 MG/DL — HIGH (ref 70–99)
GLUCOSE BLDC GLUCOMTR-MCNC: 240 MG/DL — HIGH (ref 70–99)
GLUCOSE SERPL-MCNC: 141 MG/DL — HIGH (ref 70–99)
HCT VFR BLD CALC: 41.1 % — SIGNIFICANT CHANGE UP (ref 34.5–45)
HGB BLD-MCNC: 13 G/DL — SIGNIFICANT CHANGE UP (ref 11.5–15.5)
INR BLD: 1.59 RATIO — HIGH (ref 0.88–1.16)
MCHC RBC-ENTMCNC: 30.2 PG — SIGNIFICANT CHANGE UP (ref 27–34)
MCHC RBC-ENTMCNC: 31.6 GM/DL — LOW (ref 32–36)
MCV RBC AUTO: 95.4 FL — SIGNIFICANT CHANGE UP (ref 80–100)
NRBC # BLD: 0 /100 WBCS — SIGNIFICANT CHANGE UP (ref 0–0)
PLATELET # BLD AUTO: 404 K/UL — HIGH (ref 150–400)
POTASSIUM SERPL-MCNC: 4.1 MMOL/L — SIGNIFICANT CHANGE UP (ref 3.5–5.3)
POTASSIUM SERPL-SCNC: 4.1 MMOL/L — SIGNIFICANT CHANGE UP (ref 3.5–5.3)
PROT SERPL-MCNC: 7.3 GM/DL — SIGNIFICANT CHANGE UP (ref 6–8.3)
PROTHROM AB SERPL-ACNC: 17.3 SEC — HIGH (ref 9.8–12.7)
RBC # BLD: 4.31 M/UL — SIGNIFICANT CHANGE UP (ref 3.8–5.2)
RBC # FLD: 15 % — HIGH (ref 10.3–14.5)
SODIUM SERPL-SCNC: 139 MMOL/L — SIGNIFICANT CHANGE UP (ref 135–145)
WBC # BLD: 12.35 K/UL — HIGH (ref 3.8–10.5)
WBC # FLD AUTO: 12.35 K/UL — HIGH (ref 3.8–10.5)

## 2018-08-20 RX ORDER — WARFARIN SODIUM 2.5 MG/1
7.5 TABLET ORAL ONCE
Qty: 0 | Refills: 0 | Status: COMPLETED | OUTPATIENT
Start: 2018-08-20 | End: 2018-08-20

## 2018-08-20 RX ADMIN — GABAPENTIN 300 MILLIGRAM(S): 400 CAPSULE ORAL at 06:00

## 2018-08-20 RX ADMIN — CEFEPIME 100 MILLIGRAM(S): 1 INJECTION, POWDER, FOR SOLUTION INTRAMUSCULAR; INTRAVENOUS at 17:15

## 2018-08-20 RX ADMIN — Medication 81 MILLIGRAM(S): at 11:03

## 2018-08-20 RX ADMIN — HYDROMORPHONE HYDROCHLORIDE 0.5 MILLIGRAM(S): 2 INJECTION INTRAMUSCULAR; INTRAVENOUS; SUBCUTANEOUS at 21:39

## 2018-08-20 RX ADMIN — GABAPENTIN 300 MILLIGRAM(S): 400 CAPSULE ORAL at 13:21

## 2018-08-20 RX ADMIN — Medication 1: at 13:22

## 2018-08-20 RX ADMIN — FAMOTIDINE 20 MILLIGRAM(S): 10 INJECTION INTRAVENOUS at 11:03

## 2018-08-20 RX ADMIN — Medication 1: at 07:42

## 2018-08-20 RX ADMIN — OXYCODONE HYDROCHLORIDE 10 MILLIGRAM(S): 5 TABLET ORAL at 12:28

## 2018-08-20 RX ADMIN — Medication 250 MILLIGRAM(S): at 07:45

## 2018-08-20 RX ADMIN — Medication 40 MILLIGRAM(S): at 06:00

## 2018-08-20 RX ADMIN — WARFARIN SODIUM 7.5 MILLIGRAM(S): 2.5 TABLET ORAL at 21:39

## 2018-08-20 RX ADMIN — Medication 100 MILLIGRAM(S): at 06:00

## 2018-08-20 RX ADMIN — Medication 1: at 17:17

## 2018-08-20 RX ADMIN — HYDROMORPHONE HYDROCHLORIDE 0.5 MILLIGRAM(S): 2 INJECTION INTRAMUSCULAR; INTRAVENOUS; SUBCUTANEOUS at 04:24

## 2018-08-20 RX ADMIN — HYDROMORPHONE HYDROCHLORIDE 0.5 MILLIGRAM(S): 2 INJECTION INTRAMUSCULAR; INTRAVENOUS; SUBCUTANEOUS at 05:55

## 2018-08-20 RX ADMIN — Medication 325 MILLIGRAM(S): at 11:03

## 2018-08-20 RX ADMIN — Medication 250 MILLIGRAM(S): at 18:23

## 2018-08-20 RX ADMIN — ONDANSETRON 4 MILLIGRAM(S): 8 TABLET, FILM COATED ORAL at 08:01

## 2018-08-20 RX ADMIN — Medication 20 MILLIEQUIVALENT(S): at 11:11

## 2018-08-20 RX ADMIN — GABAPENTIN 300 MILLIGRAM(S): 400 CAPSULE ORAL at 21:39

## 2018-08-20 RX ADMIN — CEFEPIME 100 MILLIGRAM(S): 1 INJECTION, POWDER, FOR SOLUTION INTRAMUSCULAR; INTRAVENOUS at 06:00

## 2018-08-20 RX ADMIN — Medication 10 MILLIGRAM(S): at 11:03

## 2018-08-20 RX ADMIN — OXYCODONE HYDROCHLORIDE 10 MILLIGRAM(S): 5 TABLET ORAL at 13:19

## 2018-08-20 RX ADMIN — MONTELUKAST 10 MILLIGRAM(S): 4 TABLET, CHEWABLE ORAL at 11:05

## 2018-08-20 NOTE — PROGRESS NOTE ADULT - ASSESSMENT
69 y/o female with h/o A.fib on coumadin, CHF (unknown EF), DM, ulcerative colitis, HTN, PVD was admitted on 8/13 after she was sent in by vascular due to worsening B/L LE cellulitis and edema x 2 weeks. Patient states she had been wearing russ boots to help with her LE edema and they were changed weekly. Patient states last week when boots were being changed, she had noticed increased redness on her RLE, but the boots were placed anyway. Patient began to experience pain and when boots were taken off, she noticed the RLE was severely discolored. Patient was placed on outpatient antibiotics by Dr. Rivera 3 days PTA, but her legs did not improve much and when she saw Dr. Rivera in office, she was sent to ED for IV antibiotics. Patient denied any fever or chills. In ER, she received vancomycin IV.    1. B/l lower extremities chronic stasis dermatitis with superimposed extensive RLE cellulitis. PVD. Allergy to PCN.   -right leg is improving slowly; new blisters noted  -f/u BC x 2  -on vancomycin 1.750 gm IV q12h and cefepime 2 gm IV q12h # 7  -tolerating abx well so far; no side effects noted  -monitor closely in ej of PCN allergy history  -vancomycin level is therapeutic  -elevate legs  -continue IV abx coverage  -monitor temps  -f/u CBC  -supportive care  2. Other issues:   -care per medicine

## 2018-08-20 NOTE — PROGRESS NOTE ADULT - SUBJECTIVE AND OBJECTIVE BOX
Date of service: 18 @ 14:07    Lying in bed in NAD  Has pain on posterior aspect of RLE  2 serous filled blisters noted in same area    ROS: no fever or chills; denies dizziness, no HA, no SOB or cough, no abdominal pain, no diarrhea or constipation; no dysuria, no legs pain, no rashes    MEDICATIONS  (STANDING):  aspirin enteric coated 81 milliGRAM(s) Oral daily  budesonide  ER  tablet (Uceris) 9 milliGRAM(s) 9 milliGRAM(s) Oral <User Schedule>  cefepime   IVPB      cefepime   IVPB 2000 milliGRAM(s) IV Intermittent every 12 hours  dextrose 5%. 1000 milliLiter(s) (50 mL/Hr) IV Continuous <Continuous>  dextrose 50% Injectable 12.5 Gram(s) IV Push once  dextrose 50% Injectable 25 Gram(s) IV Push once  dextrose 50% Injectable 25 Gram(s) IV Push once  dicyclomine 10 milliGRAM(s) Oral daily  diltiazem    Tablet 120 milliGRAM(s) Oral two times a day  docusate sodium 100 milliGRAM(s) Oral three times a day  famotidine    Tablet 20 milliGRAM(s) Oral daily  ferrous    sulfate 325 milliGRAM(s) Oral daily  furosemide    Tablet 40 milliGRAM(s) Oral daily  gabapentin 300 milliGRAM(s) Oral three times a day  insulin lispro (HumaLOG) corrective regimen sliding scale   SubCutaneous Before meals and at bedtime  metoprolol succinate  milliGRAM(s) Oral daily  montelukast 10 milliGRAM(s) Oral daily  potassium chloride    Tablet ER 20 milliEquivalent(s) Oral daily  vancomycin  IVPB 1750 milliGRAM(s) IV Intermittent every 12 hours  warfarin 7.5 milliGRAM(s) Oral once      Vital Signs Last 24 Hrs  T(C): 36.8 (20 Aug 2018 12:10), Max: 37.2 (20 Aug 2018 04:07)  T(F): 98.3 (20 Aug 2018 12:10), Max: 98.9 (20 Aug 2018 04:07)  HR: 70 (20 Aug 2018 12:10) (69 - 90)  BP: 145/70 (20 Aug 2018 12:10) (125/77 - 145/70)  BP(mean): --  RR: 16 (20 Aug 2018 12:10) (16 - 18)  SpO2: 92% (20 Aug 2018 12:10) (92% - 96%)    Physical Exam:      Constitutional: frail looking  HEENT: NC/AT, EOMI, PERRLA, conjunctivae clear  Neck: supple; thyroid not palpable  Back: no tenderness  Respiratory: respiratory effort normal; clear to auscultation  Cardiovascular: S1S2 regular, no murmurs  Abdomen: soft, not tender, not distended, positive BS  Genitourinary: no suprapubic tenderness  Lymphatic: no LN palpable  Musculoskeletal: no muscle tenderness, no joint swelling or tenderness  Extremities: right leg erythema and edema from ankle to below the knee - much improved; skin is less tense; few dry ulcers; several small blisters on right calf  Neurological/ Psychiatric: AxOx3, moving all extremities  Skin: no rashes; no palpable lesions    Labs: reviewed                        13.0   12.35 )-----------( 404      ( 20 Aug 2018 07:09 )             41.1         139  |  102  |  13  ----------------------------<  141<H>  4.1   |  29  |  0.56    Ca    8.6      20 Aug 2018 07:09    TPro  7.3  /  Alb  2.5<L>  /  TBili  0.6  /  DBili  x   /  AST  76<H>  /  ALT  100<H>  /  AlkPhos  191<H>                          11.8   11.83 )-----------( 354      ( 19 Aug 2018 07:06 )             37.4         138  |  103  |  13  ----------------------------<  146<H>  3.9   |  30  |  0.49<L>    Ca    8.4<L>      19 Aug 2018 07:06    TPro  7.7  /  Alb  2.6<L>  /  TBili  0.4  /  DBili  x   /  AST  35  /  ALT  138<H>  /  AlkPhos  210<H>        Vancomycin Level, Trough: 10.6 ug/mL ( @ 16:42)                        12.7   11.75 )-----------( 381      ( 18 Aug 2018 07:47 )             39.6     08-18    138  |  102  |  16  ----------------------------<  168<H>  4.1   |  27  |  0.54    Ca    8.7      18 Aug 2018 07:47    TPro  7.7  /  Alb  2.6<L>  /  TBili  0.4  /  DBili  x   /  AST  35  /  ALT  138<H>  /  AlkPhos  210<H>        Vancomycin Level, Trough: 10.6 ug/mL ( @ 16:42)                          12.2   9.98  )-----------( 366      ( 14 Aug 2018 07:33 )             37.9     -    138  |  103  |  9   ----------------------------<  167<H>  3.7   |  25  |  0.59    Ca    8.2<L>      14 Aug 2018 07:33  Phos  3.2       Mg     1.6         TPro  7.5  /  Alb  2.6<L>  /  TBili  1.0  /  DBili  x   /  AST  218<H>  /  ALT  237<H>  /  AlkPhos  190<H>       LIVER FUNCTIONS - ( 14 Aug 2018 07:33 )  Alb: 2.6 g/dL / Pro: 7.5 gm/dL / ALK PHOS: 190 U/L / ALT: 237 U/L / AST: 218 U/L / GGT: x           Urinalysis Basic - ( 13 Aug 2018 13:27 )    Color: Yellow / Appearance: Clear / S.020 / pH: x  Gluc: x / Ketone: Negative  / Bili: Negative / Urobili: 1 mg/dL   Blood: x / Protein: Negative mg/dL / Nitrite: Negative   Leuk Esterase: Trace / RBC: 0-2 /HPF / WBC 0-2   Sq Epi: x / Non Sq Epi: Occasional / Bacteria: Occasional      Culture - Blood (collected 13 Aug 2018 13:41)  Source: .Blood None  Preliminary Report (14 Aug 2018 19:01):    No growth to date.    Culture - Blood (collected 13 Aug 2018 13:29)  Source: .Blood Blood-Peripheral  Preliminary Report (14 Aug 2018 19:01):    No growth to date.    Culture - Urine (collected 13 Aug 2018 13:27)  Source: .Urine Clean Catch (Midstream)  Final Report (14 Aug 2018 22:27):    No growth          Radiology: all available radiological tests reviewed    Advanced directives addressed: full resuscitation

## 2018-08-20 NOTE — PROGRESS NOTE ADULT - SUBJECTIVE AND OBJECTIVE BOX
cc: leg pain  hpi: 68y female w/ PMH Afib on coumadin, T2D, UC, PVC sent in vy vascular b/c worsening LE redness/edema x 2 weeks.  Pt was wearing Unna boots outpt and noticed discoloration.   8/18- chart reviewed.   Legs less painful today.  c/o RLE blisters still opening up and draining.  Ambulating to restroom w/ walker.   8/19-  still having intermittent leg pain R>L.      8/20-    ros- as per hpi above, otherwise 10 point ros neg    Vital Signs Last 24 Hrs  T(C): 37.2 (20 Aug 2018 04:07), Max: 37.2 (20 Aug 2018 04:07)  T(F): 98.9 (20 Aug 2018 04:07), Max: 98.9 (20 Aug 2018 04:07)  HR: 69 (20 Aug 2018 04:07) (62 - 90)  BP: 145/68 (20 Aug 2018 04:07) (125/77 - 145/68)  BP(mean): --  RR: 18 (20 Aug 2018 04:07) (18 - 18)  SpO2: 96% (20 Aug 2018 04:07) (96% - 97%)    PHYSICAL EXAM:  General: NAD, comfortable  Neuro: AAOx3, no focal deficits  HEENT: NCAT, MMM  Neck: Soft and supple, No JVD  Respiratory: CTA b/l, no w/r/r  Cardiovascular: S1 and S2, RRR  Gastrointestinal: +BS, soft, NTND, obese  Extremities: b/l LE edema  R>L;  RLE w/ few blisters w/ purulent drainage; warm to touch, tender to palpation  Musculoskeletal: moving all extremities        LABS: All Labs Reviewed:                        13.0   12.35 )-----------( 404      ( 20 Aug 2018 07:09 )             41.1     08-20    139  |  102  |  13  ----------------------------<  141<H>  4.1   |  29  |  0.56    Ca    8.6      20 Aug 2018 07:09    TPro  7.3  /  Alb  2.5<L>  /  TBili  0.6  /  DBili  x   /  AST  76<H>  /  ALT  100<H>  /  AlkPhos  191<H>  08-20    PT/INR - ( 20 Aug 2018 07:09 )   PT: 17.3 sec;   INR: 1.59 ratio                                 11.8   11.83 )-----------( 354      ( 19 Aug 2018 07:06 )             37.4     08-19    138  |  103  |  13  ----------------------------<  146<H>  3.9   |  30  |  0.49<L>    Ca    8.4<L>      19 Aug 2018 07:06    TPro  7.7  /  Alb  2.6<L>  /  TBili  0.4  /  DBili  x   /  AST  35  /  ALT  138<H>  /  AlkPhos  210<H>  08-18    PT/INR - ( 19 Aug 2018 07:06 )   PT: 17.4 sec;   INR: 1.60 ratio           PT/INR - ( 17 Aug 2018 07:21 )   PT: 26.7 sec;   INR: 2.43 ratio       Culture - Blood (08.13.18 @ 13:41)    Specimen Source: .Blood None    Culture Results:   No growth to date.    < from: US Abdomen Complete (08.15.18 @ 10:18) >  IMPRESSION: Enlarged fatty liver. Nonspecific echogenic lesion within the   right hepatic lobe measuring 3.6 x 3.7 x 3.3 cm and a nonspecific   echogenic lesion in the left hepatic lobe measuring 1.1 x 1.7 x 1.1 cm,   not visualized on the prior ultrasound, for which further evaluation with   an MRI is recommended.    CBD is dilated measuring up to 1.1 cm, new since prior ultrasound of 2016.    < end of copied text >    MMEDICATIONS  (STANDING):  aspirin enteric coated 81 milliGRAM(s) Oral daily  budesonide  ER  tablet (Uceris) 9 milliGRAM(s) 9 milliGRAM(s) Oral <User Schedule>  cefepime   IVPB      cefepime   IVPB 2000 milliGRAM(s) IV Intermittent every 12 hours  dextrose 5%. 1000 milliLiter(s) (50 mL/Hr) IV Continuous <Continuous>  dextrose 50% Injectable 12.5 Gram(s) IV Push once  dextrose 50% Injectable 25 Gram(s) IV Push once  dextrose 50% Injectable 25 Gram(s) IV Push once  dicyclomine 10 milliGRAM(s) Oral daily  diltiazem    Tablet 120 milliGRAM(s) Oral two times a day  docusate sodium 100 milliGRAM(s) Oral three times a day  famotidine    Tablet 20 milliGRAM(s) Oral daily  ferrous    sulfate 325 milliGRAM(s) Oral daily  furosemide    Tablet 40 milliGRAM(s) Oral daily  gabapentin 300 milliGRAM(s) Oral three times a day  insulin lispro (HumaLOG) corrective regimen sliding scale   SubCutaneous Before meals and at bedtime  metoprolol succinate  milliGRAM(s) Oral daily  montelukast 10 milliGRAM(s) Oral daily  potassium chloride    Tablet ER 20 milliEquivalent(s) Oral daily  vancomycin  IVPB 1750 milliGRAM(s) IV Intermittent every 12 hours    MEDICATIONS  (PRN):  dextrose 40% Gel 15 Gram(s) Oral once PRN Blood Glucose LESS THAN 70 milliGRAM(s)/deciliter  glucagon  Injectable 1 milliGRAM(s) IntraMuscular once PRN Glucose LESS THAN 70 milligrams/deciliter  HYDROmorphone  Injectable 0.5 milliGRAM(s) IV Push every 6 hours PRN Severe Pain (7 - 10)  ibuprofen  Tablet 400 milliGRAM(s) Oral every 6 hours PRN fever- temp >100.4F  ondansetron Injectable 4 milliGRAM(s) IV Push every 6 hours PRN Nausea and/or Vomiting  oxyCODONE    IR 10 milliGRAM(s) Oral every 6 hours PRN Moderate Pain (4 - 6)        Assessment and Plan:   68y female w/     *b/l LE cellulitis,  chronic venous insufficiency  slow improvement  - IV vanco, cefepime day 7    - cultures no growth  - still w/ leukocytosis; afebrile  - pain control  - ID f/u appreciated  - PT eval     *PAF  - rate controlled on CCB, BB  - INR goal 2-3 on coumadin--> increase coumadin tonight     *chronic diastolic CHF  - change lasix to po , monitor I/O  - low Na diet  - cxr unremarkable, no bnp, good O2 sats-  f/u outpt w/ Cardio    *abnormal LFTs  ast and alk phos remain elevated  - US abd  - fatty liver, nonspecific lesion right lobe and left lobe not seen on prior ultrasound and  CBD dilated new from 2016   - normal TB;  trend lfts, check lipid panel- normal   - will need close GI follow up and MRI      *dvt px  - on coumadin cc: leg pain  hpi: 68y female w/ PMH Afib on coumadin, T2D, UC, PVC sent in vy vascular b/c worsening LE redness/edema x 2 weeks.  Pt was wearing Unna boots outpt and noticed discoloration.   8/18- chart reviewed.   Legs less painful today.  c/o RLE blisters still opening up and draining.  Ambulating to restroom w/ walker.   8/19-  still having intermittent leg pain R>L.      8/20-  leg pain when oob.     ros- as per hpi above, otherwise 10 point ros neg    Vital Signs Last 24 Hrs  T(C): 37.2 (20 Aug 2018 04:07), Max: 37.2 (20 Aug 2018 04:07)  T(F): 98.9 (20 Aug 2018 04:07), Max: 98.9 (20 Aug 2018 04:07)  HR: 69 (20 Aug 2018 04:07) (62 - 90)  BP: 145/68 (20 Aug 2018 04:07) (125/77 - 145/68)  BP(mean): --  RR: 18 (20 Aug 2018 04:07) (18 - 18)  SpO2: 96% (20 Aug 2018 04:07) (96% - 97%)    PHYSICAL EXAM:  General: NAD, comfortable-  at bedside   Neuro: AAOx3, no focal deficits  HEENT: NCAT, MMM  Neck: Soft and supple, No JVD  Respiratory: CTA b/l, no w/r/r  Cardiovascular: S1 and S2, RRR  Gastrointestinal: +BS, soft, NTND, obese  Extremities: b/l LE edema  R>L;  RLE w/ few blisters w/ purulent drainage; warm to touch, tender to palpation  Musculoskeletal: moving all extremities        LABS: All Labs Reviewed:                        13.0   12.35 )-----------( 404      ( 20 Aug 2018 07:09 )             41.1     08-20    139  |  102  |  13  ----------------------------<  141<H>  4.1   |  29  |  0.56    Ca    8.6      20 Aug 2018 07:09    TPro  7.3  /  Alb  2.5<L>  /  TBili  0.6  /  DBili  x   /  AST  76<H>  /  ALT  100<H>  /  AlkPhos  191<H>  08-20    PT/INR - ( 20 Aug 2018 07:09 )   PT: 17.3 sec;   INR: 1.59 ratio                                 11.8   11.83 )-----------( 354      ( 19 Aug 2018 07:06 )             37.4     08-19    138  |  103  |  13  ----------------------------<  146<H>  3.9   |  30  |  0.49<L>    Ca    8.4<L>      19 Aug 2018 07:06    TPro  7.7  /  Alb  2.6<L>  /  TBili  0.4  /  DBili  x   /  AST  35  /  ALT  138<H>  /  AlkPhos  210<H>  08-18    PT/INR - ( 19 Aug 2018 07:06 )   PT: 17.4 sec;   INR: 1.60 ratio           PT/INR - ( 17 Aug 2018 07:21 )   PT: 26.7 sec;   INR: 2.43 ratio       Culture - Blood (08.13.18 @ 13:41)    Specimen Source: .Blood None    Culture Results:   No growth to date.    < from: US Abdomen Complete (08.15.18 @ 10:18) >  IMPRESSION: Enlarged fatty liver. Nonspecific echogenic lesion within the   right hepatic lobe measuring 3.6 x 3.7 x 3.3 cm and a nonspecific   echogenic lesion in the left hepatic lobe measuring 1.1 x 1.7 x 1.1 cm,   not visualized on the prior ultrasound, for which further evaluation with   an MRI is recommended.    CBD is dilated measuring up to 1.1 cm, new since prior ultrasound of 2016.    < end of copied text >    MMEDICATIONS  (STANDING):  aspirin enteric coated 81 milliGRAM(s) Oral daily  budesonide  ER  tablet (Uceris) 9 milliGRAM(s) 9 milliGRAM(s) Oral <User Schedule>  cefepime   IVPB      cefepime   IVPB 2000 milliGRAM(s) IV Intermittent every 12 hours  dextrose 5%. 1000 milliLiter(s) (50 mL/Hr) IV Continuous <Continuous>  dextrose 50% Injectable 12.5 Gram(s) IV Push once  dextrose 50% Injectable 25 Gram(s) IV Push once  dextrose 50% Injectable 25 Gram(s) IV Push once  dicyclomine 10 milliGRAM(s) Oral daily  diltiazem    Tablet 120 milliGRAM(s) Oral two times a day  docusate sodium 100 milliGRAM(s) Oral three times a day  famotidine    Tablet 20 milliGRAM(s) Oral daily  ferrous    sulfate 325 milliGRAM(s) Oral daily  furosemide    Tablet 40 milliGRAM(s) Oral daily  gabapentin 300 milliGRAM(s) Oral three times a day  insulin lispro (HumaLOG) corrective regimen sliding scale   SubCutaneous Before meals and at bedtime  metoprolol succinate  milliGRAM(s) Oral daily  montelukast 10 milliGRAM(s) Oral daily  potassium chloride    Tablet ER 20 milliEquivalent(s) Oral daily  vancomycin  IVPB 1750 milliGRAM(s) IV Intermittent every 12 hours    MEDICATIONS  (PRN):  dextrose 40% Gel 15 Gram(s) Oral once PRN Blood Glucose LESS THAN 70 milliGRAM(s)/deciliter  glucagon  Injectable 1 milliGRAM(s) IntraMuscular once PRN Glucose LESS THAN 70 milligrams/deciliter  HYDROmorphone  Injectable 0.5 milliGRAM(s) IV Push every 6 hours PRN Severe Pain (7 - 10)  ibuprofen  Tablet 400 milliGRAM(s) Oral every 6 hours PRN fever- temp >100.4F  ondansetron Injectable 4 milliGRAM(s) IV Push every 6 hours PRN Nausea and/or Vomiting  oxyCODONE    IR 10 milliGRAM(s) Oral every 6 hours PRN Moderate Pain (4 - 6)        Assessment and Plan:   68y female w/     *b/l LE cellulitis,  chronic venous insufficiency  slow improvement  - IV vanco, cefepime day 7      - cultures no growth  - still w/ leukocytosis; afebrile  - pain control  - ID f/u appreciated  - PT eval      *PAF  - rate controlled on CCB, BB  - INR goal 2-3 on coumadin--> increase coumadin tonight     *chronic diastolic CHF  - change lasix to po , monitor I/O  - low Na diet  - cxr unremarkable, no bnp, good O2 sats-  f/u outpt w/ Cardio    *abnormal LFTs  ast and alk phos remain elevated  - US abd  - fatty liver, nonspecific lesion right lobe and left lobe not seen on prior ultrasound and  CBD dilated new from 2016   - normal TB;  trend lfts, check lipid panel- normal   - will need close GI follow up and MRI      *dvt px  - on coumadin

## 2018-08-21 LAB
ALBUMIN SERPL ELPH-MCNC: 2.4 G/DL — LOW (ref 3.3–5)
ALP SERPL-CCNC: 159 U/L — HIGH (ref 40–120)
ALT FLD-CCNC: 71 U/L — SIGNIFICANT CHANGE UP (ref 12–78)
ANION GAP SERPL CALC-SCNC: 5 MMOL/L — SIGNIFICANT CHANGE UP (ref 5–17)
AST SERPL-CCNC: 12 U/L — LOW (ref 15–37)
BILIRUB SERPL-MCNC: 0.3 MG/DL — SIGNIFICANT CHANGE UP (ref 0.2–1.2)
BUN SERPL-MCNC: 15 MG/DL — SIGNIFICANT CHANGE UP (ref 7–23)
CALCIUM SERPL-MCNC: 8.5 MG/DL — SIGNIFICANT CHANGE UP (ref 8.5–10.1)
CHLORIDE SERPL-SCNC: 101 MMOL/L — SIGNIFICANT CHANGE UP (ref 96–108)
CO2 SERPL-SCNC: 32 MMOL/L — HIGH (ref 22–31)
CREAT SERPL-MCNC: 0.52 MG/DL — SIGNIFICANT CHANGE UP (ref 0.5–1.3)
GLUCOSE BLDC GLUCOMTR-MCNC: 165 MG/DL — HIGH (ref 70–99)
GLUCOSE BLDC GLUCOMTR-MCNC: 226 MG/DL — HIGH (ref 70–99)
GLUCOSE BLDC GLUCOMTR-MCNC: 231 MG/DL — HIGH (ref 70–99)
GLUCOSE BLDC GLUCOMTR-MCNC: 262 MG/DL — HIGH (ref 70–99)
GLUCOSE SERPL-MCNC: 160 MG/DL — HIGH (ref 70–99)
HCT VFR BLD CALC: 38.6 % — SIGNIFICANT CHANGE UP (ref 34.5–45)
HGB BLD-MCNC: 12.3 G/DL — SIGNIFICANT CHANGE UP (ref 11.5–15.5)
INR BLD: 1.76 RATIO — HIGH (ref 0.88–1.16)
MCHC RBC-ENTMCNC: 30.6 PG — SIGNIFICANT CHANGE UP (ref 27–34)
MCHC RBC-ENTMCNC: 31.9 GM/DL — LOW (ref 32–36)
MCV RBC AUTO: 96 FL — SIGNIFICANT CHANGE UP (ref 80–100)
NRBC # BLD: 0 /100 WBCS — SIGNIFICANT CHANGE UP (ref 0–0)
PLATELET # BLD AUTO: 360 K/UL — SIGNIFICANT CHANGE UP (ref 150–400)
POTASSIUM SERPL-MCNC: 3.9 MMOL/L — SIGNIFICANT CHANGE UP (ref 3.5–5.3)
POTASSIUM SERPL-SCNC: 3.9 MMOL/L — SIGNIFICANT CHANGE UP (ref 3.5–5.3)
PROT SERPL-MCNC: 6.8 GM/DL — SIGNIFICANT CHANGE UP (ref 6–8.3)
PROTHROM AB SERPL-ACNC: 19.2 SEC — HIGH (ref 9.8–12.7)
RBC # BLD: 4.02 M/UL — SIGNIFICANT CHANGE UP (ref 3.8–5.2)
RBC # FLD: 14.8 % — HIGH (ref 10.3–14.5)
SODIUM SERPL-SCNC: 138 MMOL/L — SIGNIFICANT CHANGE UP (ref 135–145)
WBC # BLD: 12.92 K/UL — HIGH (ref 3.8–10.5)
WBC # FLD AUTO: 12.92 K/UL — HIGH (ref 3.8–10.5)

## 2018-08-21 RX ORDER — WARFARIN SODIUM 2.5 MG/1
7.5 TABLET ORAL DAILY
Qty: 0 | Refills: 0 | Status: DISCONTINUED | OUTPATIENT
Start: 2018-08-21 | End: 2018-08-22

## 2018-08-21 RX ADMIN — Medication 250 MILLIGRAM(S): at 18:53

## 2018-08-21 RX ADMIN — CEFEPIME 100 MILLIGRAM(S): 1 INJECTION, POWDER, FOR SOLUTION INTRAMUSCULAR; INTRAVENOUS at 05:18

## 2018-08-21 RX ADMIN — FAMOTIDINE 20 MILLIGRAM(S): 10 INJECTION INTRAVENOUS at 11:08

## 2018-08-21 RX ADMIN — GABAPENTIN 300 MILLIGRAM(S): 400 CAPSULE ORAL at 16:00

## 2018-08-21 RX ADMIN — HYDROMORPHONE HYDROCHLORIDE 0.5 MILLIGRAM(S): 2 INJECTION INTRAMUSCULAR; INTRAVENOUS; SUBCUTANEOUS at 22:17

## 2018-08-21 RX ADMIN — Medication 2: at 12:04

## 2018-08-21 RX ADMIN — WARFARIN SODIUM 7.5 MILLIGRAM(S): 2.5 TABLET ORAL at 21:45

## 2018-08-21 RX ADMIN — Medication 3: at 17:39

## 2018-08-21 RX ADMIN — HYDROMORPHONE HYDROCHLORIDE 0.5 MILLIGRAM(S): 2 INJECTION INTRAMUSCULAR; INTRAVENOUS; SUBCUTANEOUS at 16:00

## 2018-08-21 RX ADMIN — GABAPENTIN 300 MILLIGRAM(S): 400 CAPSULE ORAL at 05:17

## 2018-08-21 RX ADMIN — HYDROMORPHONE HYDROCHLORIDE 0.5 MILLIGRAM(S): 2 INJECTION INTRAMUSCULAR; INTRAVENOUS; SUBCUTANEOUS at 06:48

## 2018-08-21 RX ADMIN — HYDROMORPHONE HYDROCHLORIDE 0.5 MILLIGRAM(S): 2 INJECTION INTRAMUSCULAR; INTRAVENOUS; SUBCUTANEOUS at 16:30

## 2018-08-21 RX ADMIN — MONTELUKAST 10 MILLIGRAM(S): 4 TABLET, CHEWABLE ORAL at 11:08

## 2018-08-21 RX ADMIN — Medication 40 MILLIGRAM(S): at 05:17

## 2018-08-21 RX ADMIN — HYDROMORPHONE HYDROCHLORIDE 0.5 MILLIGRAM(S): 2 INJECTION INTRAMUSCULAR; INTRAVENOUS; SUBCUTANEOUS at 02:20

## 2018-08-21 RX ADMIN — Medication 100 MILLIGRAM(S): at 05:17

## 2018-08-21 RX ADMIN — GABAPENTIN 300 MILLIGRAM(S): 400 CAPSULE ORAL at 21:45

## 2018-08-21 RX ADMIN — Medication 10 MILLIGRAM(S): at 11:08

## 2018-08-21 RX ADMIN — Medication 20 MILLIEQUIVALENT(S): at 11:08

## 2018-08-21 RX ADMIN — Medication 325 MILLIGRAM(S): at 11:08

## 2018-08-21 RX ADMIN — Medication 81 MILLIGRAM(S): at 11:08

## 2018-08-21 RX ADMIN — CEFEPIME 100 MILLIGRAM(S): 1 INJECTION, POWDER, FOR SOLUTION INTRAMUSCULAR; INTRAVENOUS at 17:33

## 2018-08-21 RX ADMIN — Medication 250 MILLIGRAM(S): at 05:52

## 2018-08-21 RX ADMIN — Medication 1: at 07:55

## 2018-08-21 RX ADMIN — OXYCODONE HYDROCHLORIDE 10 MILLIGRAM(S): 5 TABLET ORAL at 01:22

## 2018-08-21 RX ADMIN — Medication 2: at 21:45

## 2018-08-21 NOTE — PROGRESS NOTE ADULT - SUBJECTIVE AND OBJECTIVE BOX
HOSPITAL COURSE AND ASSESSMENT  68y female w/ PMH Afib on coumadin, T2D, UC, PVC sent in vy vascular b/c worsening LE redness/edema x 2 weeks.  Pt was wearing Unna boots outpt and noticed discoloration.   She was admitted to medicine for wound evaluation and cellulitis treatment. ID was involved. Course complicated by leg pain.     Anticipate discharge this PM/tomorrow AM with final ID and PT recs. Walker arranged by case mangement.      *B LE cellulitis superimposed on chronic venous insufficiency  -slow improvement, persistent leukocytosis  -cultures no growth  - IV vanco, cefepime day 8. hope to convert to PO today    - pain control  - ID f/u appreciated  - PT eval      *PAF  - rate controlled on CCB, BB  - INR goal 2-3 on coumadin--> increase coumadin tonight     *chronic diastolic CHF  - lasix  po  - low Na diet  - cxr unremarkable, no bnp, good O2 sats-  f/u outpt w/ Cardio    *abnormal LFTs  -ast and alk phos remain elevated  - US abd  - fatty liver, nonspecific lesion right lobe and left lobe not seen on prior ultrasound and  CBD dilated new from 2016   - normal TB;  trend lfts, check lipid panel- normal   - will need close GI follow up and MRI      *dvt px  - on coumadin    ----------------------------------------------------------------------------------------------  CHIEF COMPLAINT:  venous insufficiency    SUBJECTIVE:     tolerating PO. OOB. still some pain in legs. wants to go home    REVIEW OF SYSTEMS:  All other review of systems is negative unless indicated above    Vital Signs Last 24 Hrs  T(C): 36.2 (21 Aug 2018 04:16), Max: 37.1 (20 Aug 2018 17:38)  T(F): 97.1 (21 Aug 2018 04:16), Max: 98.7 (20 Aug 2018 17:38)  HR: 82 (21 Aug 2018 04:16) (70 - 84)  BP: 143/63 (21 Aug 2018 04:16) (143/63 - 145/70)  BP(mean): --  RR: 18 (21 Aug 2018 04:16) (16 - 18)  SpO2: 97% (21 Aug 2018 04:16) (92% - 97%)  CAPILLARY BLOOD GLUCOSE      POCT Blood Glucose.: 165 mg/dL (21 Aug 2018 07:45)  POCT Blood Glucose.: 240 mg/dL (20 Aug 2018 21:31)  POCT Blood Glucose.: 186 mg/dL (20 Aug 2018 16:35)  POCT Blood Glucose.: 160 mg/dL (20 Aug 2018 12:34)      PHYSICAL EXAM:  Constitutional: NAD, NCAT  HEENT: EOMI, Normal Hearing, MMM, fair dentition  Neck: trachea midline, No JVD  Respiratory: Breath sounds are clear, unlabored respiration  Cardiovascular: S1 and S2, regular rate   Gastrointestinal: Bowel Sounds present, soft, nontender, nondistended  Extremities: No peripheral edema  Vascular: 2+ radial pulse  Neurological: awake, alert, follows commands, sensation grossly intact  Psych: appropriate affect,  insight  Musculoskeletal: moves all extremities  Skin: No rashes, legs with some weeping, chronic changes    ALLERGIES  penicillin (Hives)      MEDICATIONS  (STANDING):  aspirin enteric coated 81 milliGRAM(s) Oral daily  budesonide  ER  tablet (Uceris) 9 milliGRAM(s) 9 milliGRAM(s) Oral <User Schedule>  cefepime   IVPB      cefepime   IVPB 2000 milliGRAM(s) IV Intermittent every 12 hours  dextrose 5%. 1000 milliLiter(s) (50 mL/Hr) IV Continuous <Continuous>  dextrose 50% Injectable 12.5 Gram(s) IV Push once  dextrose 50% Injectable 25 Gram(s) IV Push once  dextrose 50% Injectable 25 Gram(s) IV Push once  dicyclomine 10 milliGRAM(s) Oral daily  diltiazem    Tablet 120 milliGRAM(s) Oral two times a day  docusate sodium 100 milliGRAM(s) Oral three times a day  famotidine    Tablet 20 milliGRAM(s) Oral daily  ferrous    sulfate 325 milliGRAM(s) Oral daily  furosemide    Tablet 40 milliGRAM(s) Oral daily  gabapentin 300 milliGRAM(s) Oral three times a day  insulin lispro (HumaLOG) corrective regimen sliding scale   SubCutaneous Before meals and at bedtime  metoprolol succinate  milliGRAM(s) Oral daily  montelukast 10 milliGRAM(s) Oral daily  potassium chloride    Tablet ER 20 milliEquivalent(s) Oral daily  vancomycin  IVPB 1750 milliGRAM(s) IV Intermittent every 12 hours      LABS: All Labs Reviewed:                        12.3   12.92 )-----------( 360      ( 21 Aug 2018 06:43 )             38.6     08-21    138  |  101  |  15  ----------------------------<  160<H>  3.9   |  32<H>  |  0.52    Ca    8.5      21 Aug 2018 06:43    TPro  6.8  /  Alb  2.4<L>  /  TBili  0.3  /  DBili  x   /  AST  12<L>  /  ALT  71  /  AlkPhos  159<H>  08-21    PT/INR - ( 21 Aug 2018 06:43 )   PT: 19.2 sec;   INR: 1.76 ratio               Blood Culture:

## 2018-08-22 ENCOUNTER — TRANSCRIPTION ENCOUNTER (OUTPATIENT)
Age: 68
End: 2018-08-22

## 2018-08-22 VITALS
TEMPERATURE: 98 F | HEART RATE: 78 BPM | SYSTOLIC BLOOD PRESSURE: 133 MMHG | OXYGEN SATURATION: 95 % | RESPIRATION RATE: 20 BRPM | DIASTOLIC BLOOD PRESSURE: 61 MMHG

## 2018-08-22 LAB
GLUCOSE BLDC GLUCOMTR-MCNC: 176 MG/DL — HIGH (ref 70–99)
GLUCOSE BLDC GLUCOMTR-MCNC: 193 MG/DL — HIGH (ref 70–99)
INR BLD: 2.04 RATIO — HIGH (ref 0.88–1.16)
PROTHROM AB SERPL-ACNC: 22.3 SEC — HIGH (ref 9.8–12.7)

## 2018-08-22 RX ORDER — OXYCODONE HYDROCHLORIDE 5 MG/1
1 TABLET ORAL
Qty: 20 | Refills: 0
Start: 2018-08-22 | End: 2018-08-26

## 2018-08-22 RX ORDER — GABAPENTIN 400 MG/1
1 CAPSULE ORAL
Qty: 90 | Refills: 0
Start: 2018-08-22 | End: 2018-09-20

## 2018-08-22 RX ORDER — CEFUROXIME AXETIL 250 MG
1 TABLET ORAL
Qty: 10 | Refills: 0
Start: 2018-08-22 | End: 2018-08-26

## 2018-08-22 RX ADMIN — Medication 20 MILLIEQUIVALENT(S): at 12:44

## 2018-08-22 RX ADMIN — MONTELUKAST 10 MILLIGRAM(S): 4 TABLET, CHEWABLE ORAL at 12:45

## 2018-08-22 RX ADMIN — HYDROMORPHONE HYDROCHLORIDE 0.5 MILLIGRAM(S): 2 INJECTION INTRAMUSCULAR; INTRAVENOUS; SUBCUTANEOUS at 05:05

## 2018-08-22 RX ADMIN — Medication 325 MILLIGRAM(S): at 12:42

## 2018-08-22 RX ADMIN — Medication 81 MILLIGRAM(S): at 12:40

## 2018-08-22 RX ADMIN — Medication 40 MILLIGRAM(S): at 05:07

## 2018-08-22 RX ADMIN — Medication 10 MILLIGRAM(S): at 12:41

## 2018-08-22 RX ADMIN — GABAPENTIN 300 MILLIGRAM(S): 400 CAPSULE ORAL at 05:06

## 2018-08-22 RX ADMIN — Medication 100 MILLIGRAM(S): at 05:07

## 2018-08-22 RX ADMIN — Medication 1: at 12:41

## 2018-08-22 RX ADMIN — FAMOTIDINE 20 MILLIGRAM(S): 10 INJECTION INTRAVENOUS at 12:41

## 2018-08-22 RX ADMIN — Medication 1: at 08:29

## 2018-08-22 RX ADMIN — Medication 250 MILLIGRAM(S): at 05:07

## 2018-08-22 RX ADMIN — CEFEPIME 100 MILLIGRAM(S): 1 INJECTION, POWDER, FOR SOLUTION INTRAMUSCULAR; INTRAVENOUS at 05:05

## 2018-08-22 NOTE — PROGRESS NOTE ADULT - PROVIDER SPECIALTY LIST ADULT
Hospitalist
Infectious Disease
Hospitalist
Hospitalist

## 2018-08-22 NOTE — PROGRESS NOTE ADULT - ASSESSMENT
69 y/o female with h/o A.fib on coumadin, CHF (unknown EF), DM, ulcerative colitis, HTN, PVD was admitted on 8/13 after she was sent in by vascular due to worsening B/L LE cellulitis and edema x 2 weeks. Patient states she had been wearing russ boots to help with her LE edema and they were changed weekly. Patient states last week when boots were being changed, she had noticed increased redness on her RLE, but the boots were placed anyway. Patient began to experience pain and when boots were taken off, she noticed the RLE was severely discolored. Patient was placed on outpatient antibiotics by Dr. Rivera 3 days PTA, but her legs did not improve much and when she saw Dr. Rivera in office, she was sent to ED for IV antibiotics. Patient denied any fever or chills. In ER, she received vancomycin IV.    1. B/l lower extremities chronic stasis dermatitis with superimposed extensive RLE cellulitis. PVD. Allergy to PCN.   -right leg is improving slowly; new blisters noted  -f/u BC x 2  -on vancomycin 1.750 gm IV q12h and cefepime 2 gm IV q12h # 9  -tolerating abx well so far; no side effects noted  -monitor closely in ej of PCN allergy history  -change abx to ceftin 500 mg PO q12h for 14 more days  -elevate legs  -continue IV abx coverage  -monitor temps  -f/u CBC  -supportive care  2. Other issues:   -care per medicine

## 2018-08-22 NOTE — DISCHARGE NOTE ADULT - CARE PLAN
Principal Discharge DX:	Cellulitis of right lower extremity  Goal:	to decrease infection  Assessment and plan of treatment:	Take medication as prescribed.    PCP- follow up in 1 week. Bring these papers with you to that appointment so your PCP can request records from your hospital stay.

## 2018-08-22 NOTE — DISCHARGE NOTE ADULT - HOSPITAL COURSE
68y female w/ PMH Afib on coumadin, T2D, UC, PVC sent in vy vascular b/c worsening LE redness/edema x 2 weeks.  Pt was wearing Unna boots outpt and noticed discoloration.   She was admitted to medicine for wound evaluation and cellulitis treatment. ID was involved. Course complicated by leg pain. She was evaluated by PT and she used a walker for ambulation.       *B LE cellulitis superimposed on chronic venous insufficiency  -slow improvement, persistent leukocytosis  -cultures no growth  - IV vanco, cefepime day 9. to convert to PO on discharge to complete 14 day course  - pain control  - ID f/u appreciated  - PT eval  recs walker    *PAF  - rate controlled on CCB, BB  - INR goal 2-3 on coumadin-->at goal with 7.5 for 2 days. Decrease to 5mg daily on discharge with outpatient check    *chronic diastolic CHF  - lasix  po  - low Na diet  - cxr unremarkable, no bnp, good O2 sats-  f/u outpt w/ Cardio    *abnormal LFTs  -ast and alk phos remain elevated  - US abd  - fatty liver, nonspecific lesion right lobe and left lobe not seen on prior ultrasound and  CBD dilated new from 2016   - normal TB;  trend lfts, check lipid panel- normal   - will need close GI follow up as outpatient     *dvt px  - on coumadin    total time, including coordination of care: 32 minutes  GEN: NAD  EYES: eomi  CV: no edema  RESP: unlabored

## 2018-08-22 NOTE — PROGRESS NOTE ADULT - NSHPATTENDINGPLANDISCUSS_GEN_ALL_CORE
patient, team
pt, team
 and son at bedside
 at bedside
patient, , ID, nursing, PT
patient, , team
patient, , nursing
patient, nursing, ID
patient, nursing, ID

## 2018-08-22 NOTE — DISCHARGE NOTE ADULT - ADDITIONAL INSTRUCTIONS
You will need to follow up with a Gastroenterologist for your liver function.     INR will need to be checked at your PCP follow up.    Cardiology follow up as previously scheduled.

## 2018-08-22 NOTE — PROGRESS NOTE ADULT - SUBJECTIVE AND OBJECTIVE BOX
Date of service: 18 @ 12:19    Lying in bed in NAD  Still with right calf pain    ROS: no fever or chills; denies dizziness, no HA, no SOB or cough, no abdominal pain, no diarrhea or constipation; no dysuria    MEDICATIONS  (STANDING):  aspirin enteric coated 81 milliGRAM(s) Oral daily  budesonide  ER  tablet (Uceris) 9 milliGRAM(s) 9 milliGRAM(s) Oral <User Schedule>  cefepime   IVPB      cefepime   IVPB 2000 milliGRAM(s) IV Intermittent every 12 hours  dextrose 5%. 1000 milliLiter(s) (50 mL/Hr) IV Continuous <Continuous>  dextrose 50% Injectable 12.5 Gram(s) IV Push once  dextrose 50% Injectable 25 Gram(s) IV Push once  dextrose 50% Injectable 25 Gram(s) IV Push once  dicyclomine 10 milliGRAM(s) Oral daily  diltiazem    Tablet 120 milliGRAM(s) Oral two times a day  docusate sodium 100 milliGRAM(s) Oral three times a day  famotidine    Tablet 20 milliGRAM(s) Oral daily  ferrous    sulfate 325 milliGRAM(s) Oral daily  furosemide    Tablet 40 milliGRAM(s) Oral daily  gabapentin 300 milliGRAM(s) Oral three times a day  insulin lispro (HumaLOG) corrective regimen sliding scale   SubCutaneous Before meals and at bedtime  metoprolol succinate  milliGRAM(s) Oral daily  montelukast 10 milliGRAM(s) Oral daily  potassium chloride    Tablet ER 20 milliEquivalent(s) Oral daily  vancomycin  IVPB 1750 milliGRAM(s) IV Intermittent every 12 hours  warfarin 7.5 milliGRAM(s) Oral daily      Vital Signs Last 24 Hrs  T(C): 36.7 (22 Aug 2018 11:51), Max: 37 (21 Aug 2018 18:06)  T(F): 98 (22 Aug 2018 11:51), Max: 98.6 (21 Aug 2018 18:06)  HR: 78 (22 Aug 2018 11:51) (71 - 84)  BP: 133/61 (22 Aug 2018 11:51) (107/47 - 146/68)  BP(mean): --  RR: 20 (22 Aug 2018 11:51) (16 - 20)  SpO2: 95% (22 Aug 2018 11:51) (92% - 96%)    Physical Exam:      Constitutional: frail looking  HEENT: NC/AT, EOMI, PERRLA, conjunctivae clear  Neck: supple; thyroid not palpable  Back: no tenderness  Respiratory: respiratory effort normal; clear to auscultation  Cardiovascular: S1S2 regular, no murmurs  Abdomen: soft, not tender, not distended, positive BS  Genitourinary: no suprapubic tenderness  Lymphatic: no LN palpable  Musculoskeletal: no muscle tenderness, no joint swelling or tenderness  Extremities: right leg erythema and edema from ankle to below the knee - much improved; skin is less tense; few dry ulcers; several small blisters on right calf; mild tenderness  Neurological/ Psychiatric: AxOx3, moving all extremities  Skin: no rashes; no palpable lesions    Labs: reviewed                        12.3    )-----------( 360      ( 21 Aug 2018 06:43 )             38.6         138  |  101  |  15  ----------------------------<  160<H>  3.9   |  32<H>  |  0.52    Ca    8.5      21 Aug 2018 06:43    TPro  6.8  /  Alb  2.4<L>  /  TBili  0.3  /  DBili  x   /  AST  12<L>  /  ALT  71  /  AlkPhos  159<H>                            11.8   11.83 )-----------( 354      ( 19 Aug 2018 07:06 )             37.4         138  |  103  |  13  ----------------------------<  146<H>  3.9   |  30  |  0.49<L>    Ca    8.4<L>      19 Aug 2018 07:06    TPro  7.7  /  Alb  2.6<L>  /  TBili  0.4  /  DBili  x   /  AST  35  /  ALT  138<H>  /  AlkPhos  210<H>        Vancomycin Level, Trough: 10.6 ug/mL ( @ 16:42)                        12.7   11.75 )-----------( 381      ( 18 Aug 2018 07:47 )             39.6     08    138  |  102  |  16  ----------------------------<  168<H>  4.1   |  27  |  0.54    Ca    8.7      18 Aug 2018 07:47    TPro  7.7  /  Alb  2.6<L>  /  TBili  0.4  /  DBili  x   /  AST  35  /  ALT  138<H>  /  AlkPhos  210<H>        Vancomycin Level, Trough: 10.6 ug/mL ( @ 16:42)                          12.2   9.98  )-----------( 366      ( 14 Aug 2018 07:33 )             37.9     08-14    138  |  103  |  9   ----------------------------<  167<H>  3.7   |  25  |  0.59    Ca    8.2<L>      14 Aug 2018 07:33  Phos  3.2     -  Mg     1.6         TPro  7.5  /  Alb  2.6<L>  /  TBili  1.0  /  DBili  x   /  AST  218<H>  /  ALT  237<H>  /  AlkPhos  190<H>       LIVER FUNCTIONS - ( 14 Aug 2018 07:33 )  Alb: 2.6 g/dL / Pro: 7.5 gm/dL / ALK PHOS: 190 U/L / ALT: 237 U/L / AST: 218 U/L / GGT: x           Urinalysis Basic - ( 13 Aug 2018 13:27 )    Color: Yellow / Appearance: Clear / S.020 / pH: x  Gluc: x / Ketone: Negative  / Bili: Negative / Urobili: 1 mg/dL   Blood: x / Protein: Negative mg/dL / Nitrite: Negative   Leuk Esterase: Trace / RBC: 0-2 /HPF / WBC 0-2   Sq Epi: x / Non Sq Epi: Occasional / Bacteria: Occasional      Culture - Blood (collected 13 Aug 2018 13:41)  Source: .Blood None  Preliminary Report (14 Aug 2018 19:01):    No growth to date.    Culture - Blood (collected 13 Aug 2018 13:29)  Source: .Blood Blood-Peripheral  Preliminary Report (14 Aug 2018 19:01):    No growth to date.    Culture - Urine (collected 13 Aug 2018 13:27)  Source: .Urine Clean Catch (Midstream)  Final Report (14 Aug 2018 22:27):    No growth          Radiology: all available radiological tests reviewed    Advanced directives addressed: full resuscitation

## 2018-08-22 NOTE — DISCHARGE NOTE ADULT - MEDICATION SUMMARY - MEDICATIONS TO TAKE
I will START or STAY ON the medications listed below when I get home from the hospital:    Uceris 9 mg oral tablet, extended release  -- 1 tab(s) by mouth once a day (in the morning)  -- Indication: For adrenal    oxyCODONE 10 mg oral tablet  -- 1 tab(s) by mouth every 6 hours, As needed, Moderate Pain (4 - 6) MDD:4 tablets  -- Indication: For pain control    aspirin 81 mg oral tablet  -- 1 tab(s) by mouth once a day  -- Indication: For heart health    dilTIAZem 120 mg oral tablet  -- 1 tab(s) by mouth 2 times a day  -- Indication: For blood pressure    warfarin 7.5 mg oral tablet  -- 1 tab(s) by mouth 4 times a week  -- Indication: For blood thinner    warfarin 5 mg oral tablet  -- 1 tab(s) by mouth 3 times a week  -- Indication: For blood thinner    gabapentin 300 mg oral capsule  -- 1 cap(s) by mouth 3 times a day  -- Indication: For pain control    metFORMIN 1000 mg oral tablet  -- 1 tab(s) by mouth 2 times a day  -- Indication: For glucose control    pravastatin 10 mg oral tablet  -- 1 tab(s) by mouth once a day  -- Indication: For Cholesterol    Metoprolol Succinate  mg oral tablet, extended release  -- 1 tab(s) by mouth once a day  -- Indication: For blood pressure    cefuroxime 500 mg oral tablet  -- 1 tab(s) by mouth every 12 hours   -- Finish all this medication unless otherwise directed by prescriber.  Medication should be taken with plenty of water.  Take with food or milk.    -- Indication: For antibiotic    triamcinolone 0.5% topical cream  -- Apply on skin to affected area 2 times a day      ****Both legs****  -- Indication: For wound care    furosemide 40 mg oral tablet  -- 1 by mouth once a day  -- Indication: For fluid control    furosemide 40 mg oral tablet  -- 2 tab(s) by mouth once a day, As Needed - for edema of the legs  -- Indication: For fluid control    dicyclomine 10 mg oral capsule  -- 1 cap(s) by mouth once a day (at bedtime)  -- Indication: For bowel health    ferrous sulfate 250 mg oral capsule, extended release  -- 1 cap(s) by mouth once a day  -- Indication: For supplement    montelukast 10 mg oral tablet  -- 1 tab(s) by mouth once a day  -- Indication: For allergy    potassium chloride 20 mEq oral tablet, extended release  -- 1 tab(s) by mouth once a day      -- Indication: For supplement    potassium chloride 10 mEq oral capsule, extended release  -- 1 cap(s) by mouth once a day, As Needed        ***for cramping of the legs*****  -- Indication: For supplement    Gas-X 80 mg oral tablet, chewable  -- 1 tab(s) by mouth once a day  -- Indication: For bowel health

## 2018-08-22 NOTE — DISCHARGE NOTE ADULT - PLAN OF CARE
to decrease infection Take medication as prescribed.    PCP- follow up in 1 week. Bring these papers with you to that appointment so your PCP can request records from your hospital stay.

## 2018-08-22 NOTE — DISCHARGE NOTE ADULT - MEDICATION SUMMARY - MEDICATIONS TO STOP TAKING
I will STOP taking the medications listed below when I get home from the hospital:    Macrobid 100 mg oral capsule  -- 1 cap(s) by mouth 2 times a day       ****Course Completed****  -- Finish all this medication unless otherwise directed by prescriber.  May discolor urine or feces.  Take with food or milk.    cephalexin 500 mg oral capsule  -- 1 cap(s) by mouth every 12 hours      ***Didnt Finish Course****

## 2018-08-22 NOTE — DISCHARGE NOTE ADULT - PATIENT PORTAL LINK FT
You can access the [facility] Patient Portal, offered by [facility], by registering with the following website: [email or web address]

## 2018-08-29 DIAGNOSIS — G62.9 POLYNEUROPATHY, UNSPECIFIED: ICD-10-CM

## 2018-08-29 DIAGNOSIS — Z98.84 BARIATRIC SURGERY STATUS: ICD-10-CM

## 2018-08-29 DIAGNOSIS — Z79.01 LONG TERM (CURRENT) USE OF ANTICOAGULANTS: ICD-10-CM

## 2018-08-29 DIAGNOSIS — E66.01 MORBID (SEVERE) OBESITY DUE TO EXCESS CALORIES: ICD-10-CM

## 2018-08-29 DIAGNOSIS — E78.00 PURE HYPERCHOLESTEROLEMIA, UNSPECIFIED: ICD-10-CM

## 2018-08-29 DIAGNOSIS — L03.115 CELLULITIS OF RIGHT LOWER LIMB: ICD-10-CM

## 2018-08-29 DIAGNOSIS — I87.2 VENOUS INSUFFICIENCY (CHRONIC) (PERIPHERAL): ICD-10-CM

## 2018-08-29 DIAGNOSIS — I48.0 PAROXYSMAL ATRIAL FIBRILLATION: ICD-10-CM

## 2018-08-29 DIAGNOSIS — Z87.891 PERSONAL HISTORY OF NICOTINE DEPENDENCE: ICD-10-CM

## 2018-08-29 DIAGNOSIS — E11.40 TYPE 2 DIABETES MELLITUS WITH DIABETIC NEUROPATHY, UNSPECIFIED: ICD-10-CM

## 2018-08-29 DIAGNOSIS — K51.90 ULCERATIVE COLITIS, UNSPECIFIED, WITHOUT COMPLICATIONS: ICD-10-CM

## 2018-08-29 DIAGNOSIS — R94.5 ABNORMAL RESULTS OF LIVER FUNCTION STUDIES: ICD-10-CM

## 2018-08-29 DIAGNOSIS — I50.32 CHRONIC DIASTOLIC (CONGESTIVE) HEART FAILURE: ICD-10-CM

## 2018-08-29 DIAGNOSIS — Z79.84 LONG TERM (CURRENT) USE OF ORAL HYPOGLYCEMIC DRUGS: ICD-10-CM

## 2018-08-29 DIAGNOSIS — Z88.0 ALLERGY STATUS TO PENICILLIN: ICD-10-CM

## 2018-08-29 DIAGNOSIS — I11.0 HYPERTENSIVE HEART DISEASE WITH HEART FAILURE: ICD-10-CM

## 2018-08-29 DIAGNOSIS — Z79.82 LONG TERM (CURRENT) USE OF ASPIRIN: ICD-10-CM

## 2018-09-28 RX ORDER — CEFUROXIME AXETIL 250 MG
1 TABLET ORAL
Qty: 28 | Refills: 0
Start: 2018-09-28 | End: 2018-10-05

## 2018-10-02 ENCOUNTER — INPATIENT (INPATIENT)
Facility: HOSPITAL | Age: 68
LOS: 8 days | Discharge: HOME HEALTH | End: 2018-10-11
Attending: INTERNAL MEDICINE | Admitting: INTERNAL MEDICINE
Payer: COMMERCIAL

## 2018-10-02 VITALS — WEIGHT: 270.07 LBS

## 2018-10-02 DIAGNOSIS — K95.09 OTHER COMPLICATIONS OF GASTRIC BAND PROCEDURE: Chronic | ICD-10-CM

## 2018-10-02 DIAGNOSIS — I48.91 UNSPECIFIED ATRIAL FIBRILLATION: ICD-10-CM

## 2018-10-02 DIAGNOSIS — J45.909 UNSPECIFIED ASTHMA, UNCOMPLICATED: ICD-10-CM

## 2018-10-02 DIAGNOSIS — E11.9 TYPE 2 DIABETES MELLITUS WITHOUT COMPLICATIONS: ICD-10-CM

## 2018-10-02 DIAGNOSIS — I11.0 HYPERTENSIVE HEART DISEASE WITH HEART FAILURE: ICD-10-CM

## 2018-10-02 DIAGNOSIS — Z98.890 OTHER SPECIFIED POSTPROCEDURAL STATES: Chronic | ICD-10-CM

## 2018-10-02 DIAGNOSIS — E66.01 MORBID (SEVERE) OBESITY DUE TO EXCESS CALORIES: ICD-10-CM

## 2018-10-02 DIAGNOSIS — L03.115 CELLULITIS OF RIGHT LOWER LIMB: ICD-10-CM

## 2018-10-02 DIAGNOSIS — E78.5 HYPERLIPIDEMIA, UNSPECIFIED: ICD-10-CM

## 2018-10-02 DIAGNOSIS — L97.219 NON-PRESSURE CHRONIC ULCER OF RIGHT CALF WITH UNSPECIFIED SEVERITY: ICD-10-CM

## 2018-10-02 DIAGNOSIS — Z88.0 ALLERGY STATUS TO PENICILLIN: ICD-10-CM

## 2018-10-02 DIAGNOSIS — I87.2 VENOUS INSUFFICIENCY (CHRONIC) (PERIPHERAL): ICD-10-CM

## 2018-10-02 DIAGNOSIS — I50.32 CHRONIC DIASTOLIC (CONGESTIVE) HEART FAILURE: ICD-10-CM

## 2018-10-02 DIAGNOSIS — K76.0 FATTY (CHANGE OF) LIVER, NOT ELSEWHERE CLASSIFIED: ICD-10-CM

## 2018-10-02 DIAGNOSIS — Z79.01 LONG TERM (CURRENT) USE OF ANTICOAGULANTS: ICD-10-CM

## 2018-10-02 LAB
ALBUMIN SERPL ELPH-MCNC: 3.3 G/DL — SIGNIFICANT CHANGE UP (ref 3.3–5)
ALP SERPL-CCNC: 87 U/L — SIGNIFICANT CHANGE UP (ref 40–120)
ALT FLD-CCNC: 34 U/L — SIGNIFICANT CHANGE UP (ref 12–78)
ANION GAP SERPL CALC-SCNC: 10 MMOL/L — SIGNIFICANT CHANGE UP (ref 5–17)
APPEARANCE UR: CLEAR — SIGNIFICANT CHANGE UP
APTT BLD: 38.5 SEC — HIGH (ref 27.5–37.4)
AST SERPL-CCNC: 15 U/L — SIGNIFICANT CHANGE UP (ref 15–37)
BACTERIA # UR AUTO: ABNORMAL
BASOPHILS # BLD AUTO: 0.08 K/UL — SIGNIFICANT CHANGE UP (ref 0–0.2)
BASOPHILS NFR BLD AUTO: 0.6 % — SIGNIFICANT CHANGE UP (ref 0–2)
BILIRUB SERPL-MCNC: 0.5 MG/DL — SIGNIFICANT CHANGE UP (ref 0.2–1.2)
BILIRUB UR-MCNC: NEGATIVE — SIGNIFICANT CHANGE UP
BUN SERPL-MCNC: 25 MG/DL — HIGH (ref 7–23)
CALCIUM SERPL-MCNC: 9 MG/DL — SIGNIFICANT CHANGE UP (ref 8.5–10.1)
CHLORIDE SERPL-SCNC: 97 MMOL/L — SIGNIFICANT CHANGE UP (ref 96–108)
CO2 SERPL-SCNC: 30 MMOL/L — SIGNIFICANT CHANGE UP (ref 22–31)
COLOR SPEC: YELLOW — SIGNIFICANT CHANGE UP
CREAT SERPL-MCNC: 0.75 MG/DL — SIGNIFICANT CHANGE UP (ref 0.5–1.3)
DIFF PNL FLD: ABNORMAL
EOSINOPHIL # BLD AUTO: 0.05 K/UL — SIGNIFICANT CHANGE UP (ref 0–0.5)
EOSINOPHIL NFR BLD AUTO: 0.4 % — SIGNIFICANT CHANGE UP (ref 0–6)
EPI CELLS # UR: ABNORMAL
GLUCOSE BLDC GLUCOMTR-MCNC: 156 MG/DL — HIGH (ref 70–99)
GLUCOSE SERPL-MCNC: 123 MG/DL — HIGH (ref 70–99)
GLUCOSE UR QL: NEGATIVE MG/DL — SIGNIFICANT CHANGE UP
HCT VFR BLD CALC: 44.1 % — SIGNIFICANT CHANGE UP (ref 34.5–45)
HGB BLD-MCNC: 14.4 G/DL — SIGNIFICANT CHANGE UP (ref 11.5–15.5)
IMM GRANULOCYTES NFR BLD AUTO: 0.5 % — SIGNIFICANT CHANGE UP (ref 0–1.5)
INR BLD: 2.34 RATIO — HIGH (ref 0.88–1.16)
KETONES UR-MCNC: ABNORMAL
LEUKOCYTE ESTERASE UR-ACNC: ABNORMAL
LYMPHOCYTES # BLD AUTO: 1.8 K/UL — SIGNIFICANT CHANGE UP (ref 1–3.3)
LYMPHOCYTES # BLD AUTO: 13.9 % — SIGNIFICANT CHANGE UP (ref 13–44)
MCHC RBC-ENTMCNC: 31 PG — SIGNIFICANT CHANGE UP (ref 27–34)
MCHC RBC-ENTMCNC: 32.7 GM/DL — SIGNIFICANT CHANGE UP (ref 32–36)
MCV RBC AUTO: 95 FL — SIGNIFICANT CHANGE UP (ref 80–100)
MONOCYTES # BLD AUTO: 0.84 K/UL — SIGNIFICANT CHANGE UP (ref 0–0.9)
MONOCYTES NFR BLD AUTO: 6.5 % — SIGNIFICANT CHANGE UP (ref 2–14)
NEUTROPHILS # BLD AUTO: 10.14 K/UL — HIGH (ref 1.8–7.4)
NEUTROPHILS NFR BLD AUTO: 78.1 % — HIGH (ref 43–77)
NITRITE UR-MCNC: NEGATIVE — SIGNIFICANT CHANGE UP
NRBC # BLD: 0 /100 WBCS — SIGNIFICANT CHANGE UP (ref 0–0)
PH UR: 5 — SIGNIFICANT CHANGE UP (ref 5–8)
PLATELET # BLD AUTO: 404 K/UL — HIGH (ref 150–400)
POTASSIUM SERPL-MCNC: 3.1 MMOL/L — LOW (ref 3.5–5.3)
POTASSIUM SERPL-SCNC: 3.1 MMOL/L — LOW (ref 3.5–5.3)
PROT SERPL-MCNC: 7.8 GM/DL — SIGNIFICANT CHANGE UP (ref 6–8.3)
PROT UR-MCNC: 15 MG/DL
PROTHROM AB SERPL-ACNC: 25.7 SEC — HIGH (ref 9.8–12.7)
RBC # BLD: 4.64 M/UL — SIGNIFICANT CHANGE UP (ref 3.8–5.2)
RBC # FLD: 13.4 % — SIGNIFICANT CHANGE UP (ref 10.3–14.5)
RBC CASTS # UR COMP ASSIST: ABNORMAL /HPF (ref 0–4)
SODIUM SERPL-SCNC: 137 MMOL/L — SIGNIFICANT CHANGE UP (ref 135–145)
SP GR SPEC: 1.02 — SIGNIFICANT CHANGE UP (ref 1.01–1.02)
UROBILINOGEN FLD QL: NEGATIVE MG/DL — SIGNIFICANT CHANGE UP
WBC # BLD: 12.98 K/UL — HIGH (ref 3.8–10.5)
WBC # FLD AUTO: 12.98 K/UL — HIGH (ref 3.8–10.5)
WBC UR QL: SIGNIFICANT CHANGE UP

## 2018-10-02 PROCEDURE — 99285 EMERGENCY DEPT VISIT HI MDM: CPT

## 2018-10-02 PROCEDURE — 71045 X-RAY EXAM CHEST 1 VIEW: CPT | Mod: 26

## 2018-10-02 PROCEDURE — 93010 ELECTROCARDIOGRAM REPORT: CPT

## 2018-10-02 PROCEDURE — 93970 EXTREMITY STUDY: CPT | Mod: 26

## 2018-10-02 RX ORDER — MORPHINE SULFATE 50 MG/1
4 CAPSULE, EXTENDED RELEASE ORAL ONCE
Qty: 0 | Refills: 0 | Status: DISCONTINUED | OUTPATIENT
Start: 2018-10-02 | End: 2018-10-02

## 2018-10-02 RX ORDER — CEFEPIME 1 G/1
1000 INJECTION, POWDER, FOR SOLUTION INTRAMUSCULAR; INTRAVENOUS EVERY 12 HOURS
Qty: 0 | Refills: 0 | Status: DISCONTINUED | OUTPATIENT
Start: 2018-10-02 | End: 2018-10-03

## 2018-10-02 RX ORDER — MONTELUKAST 4 MG/1
10 TABLET, CHEWABLE ORAL DAILY
Qty: 0 | Refills: 0 | Status: DISCONTINUED | OUTPATIENT
Start: 2018-10-02 | End: 2018-10-11

## 2018-10-02 RX ORDER — VANCOMYCIN HCL 1 G
1750 VIAL (EA) INTRAVENOUS ONCE
Qty: 0 | Refills: 0 | Status: COMPLETED | OUTPATIENT
Start: 2018-10-02 | End: 2018-10-02

## 2018-10-02 RX ORDER — DEXTROSE 50 % IN WATER 50 %
12.5 SYRINGE (ML) INTRAVENOUS ONCE
Qty: 0 | Refills: 0 | Status: DISCONTINUED | OUTPATIENT
Start: 2018-10-02 | End: 2018-10-11

## 2018-10-02 RX ORDER — CEFEPIME 1 G/1
1000 INJECTION, POWDER, FOR SOLUTION INTRAMUSCULAR; INTRAVENOUS ONCE
Qty: 0 | Refills: 0 | Status: COMPLETED | OUTPATIENT
Start: 2018-10-02 | End: 2018-10-02

## 2018-10-02 RX ORDER — WARFARIN SODIUM 2.5 MG/1
5 TABLET ORAL
Qty: 0 | Refills: 0 | Status: DISCONTINUED | OUTPATIENT
Start: 2018-10-02 | End: 2018-10-02

## 2018-10-02 RX ORDER — GLUCAGON INJECTION, SOLUTION 0.5 MG/.1ML
1 INJECTION, SOLUTION SUBCUTANEOUS ONCE
Qty: 0 | Refills: 0 | Status: DISCONTINUED | OUTPATIENT
Start: 2018-10-02 | End: 2018-10-11

## 2018-10-02 RX ORDER — INFLUENZA VIRUS VACCINE 15; 15; 15; 15 UG/.5ML; UG/.5ML; UG/.5ML; UG/.5ML
0.5 SUSPENSION INTRAMUSCULAR ONCE
Qty: 0 | Refills: 0 | Status: COMPLETED | OUTPATIENT
Start: 2018-10-02 | End: 2018-10-02

## 2018-10-02 RX ORDER — DEXTROSE 50 % IN WATER 50 %
15 SYRINGE (ML) INTRAVENOUS ONCE
Qty: 0 | Refills: 0 | Status: DISCONTINUED | OUTPATIENT
Start: 2018-10-02 | End: 2018-10-11

## 2018-10-02 RX ORDER — FERROUS SULFATE 325(65) MG
325 TABLET ORAL DAILY
Qty: 0 | Refills: 0 | Status: DISCONTINUED | OUTPATIENT
Start: 2018-10-02 | End: 2018-10-11

## 2018-10-02 RX ORDER — ASPIRIN/CALCIUM CARB/MAGNESIUM 324 MG
81 TABLET ORAL DAILY
Qty: 0 | Refills: 0 | Status: DISCONTINUED | OUTPATIENT
Start: 2018-10-02 | End: 2018-10-11

## 2018-10-02 RX ORDER — BUDESONIDE, MICRONIZED 100 %
9 POWDER (GRAM) MISCELLANEOUS DAILY
Qty: 0 | Refills: 0 | Status: DISCONTINUED | OUTPATIENT
Start: 2018-10-03 | End: 2018-10-11

## 2018-10-02 RX ORDER — ATORVASTATIN CALCIUM 80 MG/1
20 TABLET, FILM COATED ORAL AT BEDTIME
Qty: 0 | Refills: 0 | Status: DISCONTINUED | OUTPATIENT
Start: 2018-10-02 | End: 2018-10-03

## 2018-10-02 RX ORDER — WARFARIN SODIUM 2.5 MG/1
5 TABLET ORAL DAILY
Qty: 0 | Refills: 0 | Status: COMPLETED | OUTPATIENT
Start: 2018-10-02 | End: 2018-10-04

## 2018-10-02 RX ORDER — ONDANSETRON 8 MG/1
4 TABLET, FILM COATED ORAL ONCE
Qty: 0 | Refills: 0 | Status: COMPLETED | OUTPATIENT
Start: 2018-10-02 | End: 2018-10-03

## 2018-10-02 RX ORDER — DEXTROSE 50 % IN WATER 50 %
25 SYRINGE (ML) INTRAVENOUS ONCE
Qty: 0 | Refills: 0 | Status: DISCONTINUED | OUTPATIENT
Start: 2018-10-02 | End: 2018-10-11

## 2018-10-02 RX ORDER — POTASSIUM CHLORIDE 20 MEQ
40 PACKET (EA) ORAL DAILY
Qty: 0 | Refills: 0 | Status: DISCONTINUED | OUTPATIENT
Start: 2018-10-02 | End: 2018-10-11

## 2018-10-02 RX ORDER — CEFEPIME 1 G/1
1000 INJECTION, POWDER, FOR SOLUTION INTRAMUSCULAR; INTRAVENOUS EVERY 12 HOURS
Qty: 0 | Refills: 0 | Status: DISCONTINUED | OUTPATIENT
Start: 2018-10-02 | End: 2018-10-02

## 2018-10-02 RX ORDER — METOPROLOL TARTRATE 50 MG
100 TABLET ORAL DAILY
Qty: 0 | Refills: 0 | Status: DISCONTINUED | OUTPATIENT
Start: 2018-10-02 | End: 2018-10-11

## 2018-10-02 RX ORDER — OXYCODONE HYDROCHLORIDE 5 MG/1
10 TABLET ORAL EVERY 6 HOURS
Qty: 0 | Refills: 0 | Status: DISCONTINUED | OUTPATIENT
Start: 2018-10-02 | End: 2018-10-09

## 2018-10-02 RX ORDER — GABAPENTIN 400 MG/1
300 CAPSULE ORAL THREE TIMES A DAY
Qty: 0 | Refills: 0 | Status: DISCONTINUED | OUTPATIENT
Start: 2018-10-02 | End: 2018-10-03

## 2018-10-02 RX ORDER — MORPHINE SULFATE 50 MG/1
2 CAPSULE, EXTENDED RELEASE ORAL ONCE
Qty: 0 | Refills: 0 | Status: DISCONTINUED | OUTPATIENT
Start: 2018-10-02 | End: 2018-10-02

## 2018-10-02 RX ORDER — SODIUM CHLORIDE 9 MG/ML
1000 INJECTION, SOLUTION INTRAVENOUS
Qty: 0 | Refills: 0 | Status: DISCONTINUED | OUTPATIENT
Start: 2018-10-02 | End: 2018-10-11

## 2018-10-02 RX ORDER — INSULIN LISPRO 100/ML
VIAL (ML) SUBCUTANEOUS
Qty: 0 | Refills: 0 | Status: DISCONTINUED | OUTPATIENT
Start: 2018-10-02 | End: 2018-10-11

## 2018-10-02 RX ORDER — ONDANSETRON 8 MG/1
4 TABLET, FILM COATED ORAL ONCE
Qty: 0 | Refills: 0 | Status: COMPLETED | OUTPATIENT
Start: 2018-10-02 | End: 2018-10-02

## 2018-10-02 RX ORDER — SODIUM CHLORIDE 9 MG/ML
3 INJECTION INTRAMUSCULAR; INTRAVENOUS; SUBCUTANEOUS EVERY 8 HOURS
Qty: 0 | Refills: 0 | Status: DISCONTINUED | OUTPATIENT
Start: 2018-10-02 | End: 2018-10-11

## 2018-10-02 RX ORDER — FUROSEMIDE 40 MG
40 TABLET ORAL DAILY
Qty: 0 | Refills: 0 | Status: DISCONTINUED | OUTPATIENT
Start: 2018-10-02 | End: 2018-10-03

## 2018-10-02 RX ORDER — POTASSIUM CHLORIDE 20 MEQ
40 PACKET (EA) ORAL ONCE
Qty: 0 | Refills: 0 | Status: COMPLETED | OUTPATIENT
Start: 2018-10-02 | End: 2018-10-02

## 2018-10-02 RX ORDER — WARFARIN SODIUM 2.5 MG/1
7.5 TABLET ORAL
Qty: 0 | Refills: 0 | Status: DISCONTINUED | OUTPATIENT
Start: 2018-10-02 | End: 2018-10-02

## 2018-10-02 RX ORDER — VANCOMYCIN HCL 1 G
1000 VIAL (EA) INTRAVENOUS EVERY 12 HOURS
Qty: 0 | Refills: 0 | Status: DISCONTINUED | OUTPATIENT
Start: 2018-10-02 | End: 2018-10-03

## 2018-10-02 RX ADMIN — ATORVASTATIN CALCIUM 20 MILLIGRAM(S): 80 TABLET, FILM COATED ORAL at 21:05

## 2018-10-02 RX ADMIN — MORPHINE SULFATE 4 MILLIGRAM(S): 50 CAPSULE, EXTENDED RELEASE ORAL at 14:46

## 2018-10-02 RX ADMIN — MORPHINE SULFATE 4 MILLIGRAM(S): 50 CAPSULE, EXTENDED RELEASE ORAL at 17:34

## 2018-10-02 RX ADMIN — MORPHINE SULFATE 2 MILLIGRAM(S): 50 CAPSULE, EXTENDED RELEASE ORAL at 23:05

## 2018-10-02 RX ADMIN — Medication 40 MILLIEQUIVALENT(S): at 18:37

## 2018-10-02 RX ADMIN — MORPHINE SULFATE 4 MILLIGRAM(S): 50 CAPSULE, EXTENDED RELEASE ORAL at 15:16

## 2018-10-02 RX ADMIN — WARFARIN SODIUM 5 MILLIGRAM(S): 2.5 TABLET ORAL at 21:05

## 2018-10-02 RX ADMIN — MORPHINE SULFATE 4 MILLIGRAM(S): 50 CAPSULE, EXTENDED RELEASE ORAL at 17:04

## 2018-10-02 RX ADMIN — Medication 1750 MILLIGRAM(S): at 17:45

## 2018-10-02 RX ADMIN — Medication 250 MILLIGRAM(S): at 15:39

## 2018-10-02 RX ADMIN — Medication 2: at 21:09

## 2018-10-02 RX ADMIN — SODIUM CHLORIDE 3 MILLILITER(S): 9 INJECTION INTRAMUSCULAR; INTRAVENOUS; SUBCUTANEOUS at 14:46

## 2018-10-02 RX ADMIN — CEFEPIME 1000 MILLIGRAM(S): 1 INJECTION, POWDER, FOR SOLUTION INTRAMUSCULAR; INTRAVENOUS at 17:55

## 2018-10-02 RX ADMIN — ONDANSETRON 4 MILLIGRAM(S): 8 TABLET, FILM COATED ORAL at 14:45

## 2018-10-02 NOTE — H&P ADULT - HISTORY OF PRESENT ILLNESS
67 y/o female w/ PMH Afib on coumadin, T2D, UC, PVC sent in by vascular surg in [facility] b/c worsening right LE redness/edema.  patient refuses to go to [facility] therefore came back here as she  was hospitalized in 8/2018 for same issue, treated with iv antibiotics. had debridement done 2 weeks ago.  associated with severe pain, yellowish discharge  denies any fever/chills, headache, chest pain, palpitations, nausea/vomiting.     In ED, s/p cefepime/vanco, doppler done-no dvt but limited study on right lower leg.      PAST MEDICAL HISTORY:  as below    PAST SURGICAL HISTORY: as below    FAMILY HISTORY:   non-contributory to the patient's current presentation

## 2018-10-02 NOTE — ED PROVIDER NOTE - OBJECTIVE STATEMENT
67 y/o F with a PMHx of AFib on Coumadin and 81mg ASA, CHF, HTN, DM II, cholecystectomy and simultaneous lap band procedure (lap band was removed) presents to the ED c/o severe pain in right lower cellulitic leg. She has been experiencing RLE cellulitis for ~1 month, pt reports began due to "weak veins." Pt describes pain as severe and constant since debridement 2 weeks ago at Dr. Rivera's office in [place]. No fevers or chills. Pt was admitted to  on 8/13/18 for 2 weeks and was treated with IV abx. Pt was seen by PA at Dr. Rivera's office today and was sent here for admission for failed outpatient therapy and eval by Vascular Dr. Garcia. Unknown allergy to Penicillin. PMD: Dr. Mayer. 69 y/o F with a PMHx of AFib on Coumadin and 81mg ASA, CHF, HTN, DM II, cholecystectomy and lap band procedure (lap band was removed) presents to the ED c/o severe pain in right lower cellulitic leg. She has been experiencing RLE cellulitis for ~1 month, pt reports began due to "weak veins." Pt describes pain as severe and constant since debridement 2 weeks ago at Dr. Rivera's office in [place]. No fevers or chills. Pt was admitted to  on 8/13/18 for 2 weeks and was treated with IV abx. Pt was seen by PA at Dr. Rivera's office today and was sent here for admission for failed outpatient therapy and eval by Vascular Dr. Garcia. Unknown allergy to Penicillin. PMD: Dr. Mayer.

## 2018-10-02 NOTE — ED ADULT NURSE NOTE - NSSISCREENINGQ4_ED_A_ED
No
Alert-The patient is alert, awake and responds to voice. The patient is oriented to time, place, and person. The triage nurse is able to obtain subjective information.

## 2018-10-02 NOTE — H&P ADULT - ASSESSMENT
67 y/o female    right LE cellulitis/ulcer with chronic venous stasis changes bilaterally  -admit to med-surg   -cont cefepime/vanco  -check labs in am   -id consult   -vascular consult   -pain meds as needed     paroxysmal afib  -rate controlled on CCB, BB  -cont coumadin--> increase coumadin tonight     chronic diastolic CHF, euvolemic  -lasix  po  -low Na diet    NIDDM  -hold home po meds   -bgm and sliding scale    abnormal LFTs on previous admission, resolved   - US abd  - fatty liver, nonspecific lesion right lobe and left lobe not seen on prior ultrasound and  CBD dilated new from 2016   - normal TB;  trend lfts, check lipid panel- normal   - will need close GI follow up and MRI      dvt px  -on coumadin 67 y/o female    right LE cellulitis/ulcer with chronic venous stasis changes bilaterally  -admit to med-surg   -cont cefepime/vanco  -check labs in am   -id consult   -vascular consult   -pain meds as needed     paroxysmal afib, inr- therapeutic   -rate controlled on CCB, BB  -cont coumadin    chronic diastolic CHF, euvolemic  -lasix  po  -low Na diet    NIDDM  -hold home po meds   -bgm and sliding scale    abnormal LFTs on previous admission, resolved   - US abd  - fatty liver, nonspecific lesion right lobe and left lobe not seen on prior ultrasound and  CBD dilated new from 2016   - normal TB;  trend lfts, check lipid panel- normal   - will need close GI follow up and MRI      dvt px  -on coumadin

## 2018-10-02 NOTE — H&P ADULT - NSHPPHYSICALEXAM_GEN_ALL_CORE
PHYSICAL EXAM:    GENERAL: NAD, well-groomed, well-developed.  obese  HEAD:  NC/AT  EYES: EOMI, PERRLA, no scleral icterus  HEENT: Moist mucous membranes  NECK: Supple, No JVD  CNS:  Alert & Oriented X3, Motor Strength 5/5 B/L upper and lower extremities; DTRs 2+ intact   LUNG: Clear to auscultation bilaterally; No rales, rhonchi, wheezing, or rubs  HEART: RRR; No murmurs, rubs, or gallops  ABDOMEN: +BS, ST/ND/NT  GENITOURINARY- Voiding, Bladder not distended  EXTREMITIES:  +chronic venous stasis changes with reddness bilaterally.  +posterior ulcer with granulation tissue on right lower leg.  no discharge, bleeding or malodor.  +tender to touch  MUSCULOSKELTAL- Joints normal ROM, no Muscle or joint tenderness

## 2018-10-02 NOTE — ED ADULT TRIAGE NOTE - CHIEF COMPLAINT QUOTE
Pt presents to ED c/o "cellulitis of my left leg". Pt denies fever. Pt reports she was hospitalized August 13th for 2 week for same s/s.

## 2018-10-02 NOTE — ED PROVIDER NOTE - MEDICAL DECISION MAKING DETAILS
67 y/o F with cellulitis, pressure ulcer to RLE with failed outpatient therapy. EKG, CXR, labs, and admit

## 2018-10-02 NOTE — ED STATDOCS - PROGRESS NOTE DETAILS
69 y/o female with a PMHx of AFib, CHF, HTN, DM II, cholecystectomy and simultaneous lap band procedure (lap band was removed) presents to the ED c/o severe pain in right lower cellulitic leg. Pt describes pain as severe and constant since debridement 2 weeks ago at Dr. Rivera's office in [place]. No fevers or chills. Pt was admitted to  on 8/13/18 for 2 weeks and was treated with IV abx. Pt was seen by PA at Dr. Rivera's office today and was sent here for admission and eval by Dr. Christianson. Pt on Coumadin, low dose ASA, metformin, metoprolol, Lasix, KCL, Diltiazem, Montelukast. Will send pt to main ED for further evaluation.

## 2018-10-02 NOTE — ED PROVIDER NOTE - SKIN, MLM
Two ulcers, one 4x2cm and one 2x2cm, to the posterior right tibia with surrounding erythema and warmth.

## 2018-10-02 NOTE — ED ADULT NURSE REASSESSMENT NOTE - NS ED NURSE REASSESS COMMENT FT1
pt stated she was nauseous no prn zofran. paged dr lawson received one time order. went to give pt med and is currently eating a burger within 5 minutes. pts is clearly not nauseous anymore and will not give pt medication. giving report to 5s now

## 2018-10-03 LAB
ALBUMIN SERPL ELPH-MCNC: 2.7 G/DL — LOW (ref 3.3–5)
ALP SERPL-CCNC: 71 U/L — SIGNIFICANT CHANGE UP (ref 40–120)
ALT FLD-CCNC: 26 U/L — SIGNIFICANT CHANGE UP (ref 12–78)
ANION GAP SERPL CALC-SCNC: 8 MMOL/L — SIGNIFICANT CHANGE UP (ref 5–17)
AST SERPL-CCNC: 12 U/L — LOW (ref 15–37)
BASOPHILS # BLD AUTO: 0.08 K/UL — SIGNIFICANT CHANGE UP (ref 0–0.2)
BASOPHILS NFR BLD AUTO: 0.7 % — SIGNIFICANT CHANGE UP (ref 0–2)
BILIRUB SERPL-MCNC: 0.5 MG/DL — SIGNIFICANT CHANGE UP (ref 0.2–1.2)
BUN SERPL-MCNC: 16 MG/DL — SIGNIFICANT CHANGE UP (ref 7–23)
CALCIUM SERPL-MCNC: 8.3 MG/DL — LOW (ref 8.5–10.1)
CHLORIDE SERPL-SCNC: 98 MMOL/L — SIGNIFICANT CHANGE UP (ref 96–108)
CO2 SERPL-SCNC: 31 MMOL/L — SIGNIFICANT CHANGE UP (ref 22–31)
CREAT SERPL-MCNC: 0.52 MG/DL — SIGNIFICANT CHANGE UP (ref 0.5–1.3)
EOSINOPHIL # BLD AUTO: 0.09 K/UL — SIGNIFICANT CHANGE UP (ref 0–0.5)
EOSINOPHIL NFR BLD AUTO: 0.8 % — SIGNIFICANT CHANGE UP (ref 0–6)
GLUCOSE BLDC GLUCOMTR-MCNC: 126 MG/DL — HIGH (ref 70–99)
GLUCOSE BLDC GLUCOMTR-MCNC: 172 MG/DL — HIGH (ref 70–99)
GLUCOSE BLDC GLUCOMTR-MCNC: 174 MG/DL — HIGH (ref 70–99)
GLUCOSE BLDC GLUCOMTR-MCNC: 213 MG/DL — HIGH (ref 70–99)
GLUCOSE SERPL-MCNC: 162 MG/DL — HIGH (ref 70–99)
HCT VFR BLD CALC: 39.2 % — SIGNIFICANT CHANGE UP (ref 34.5–45)
HGB BLD-MCNC: 12.6 G/DL — SIGNIFICANT CHANGE UP (ref 11.5–15.5)
IMM GRANULOCYTES NFR BLD AUTO: 0.6 % — SIGNIFICANT CHANGE UP (ref 0–1.5)
INR BLD: 3.09 RATIO — HIGH (ref 0.88–1.16)
LYMPHOCYTES # BLD AUTO: 1.26 K/UL — SIGNIFICANT CHANGE UP (ref 1–3.3)
LYMPHOCYTES # BLD AUTO: 11.3 % — LOW (ref 13–44)
MAGNESIUM SERPL-MCNC: 1.5 MG/DL — LOW (ref 1.6–2.6)
MCHC RBC-ENTMCNC: 30.1 PG — SIGNIFICANT CHANGE UP (ref 27–34)
MCHC RBC-ENTMCNC: 32.1 GM/DL — SIGNIFICANT CHANGE UP (ref 32–36)
MCV RBC AUTO: 93.8 FL — SIGNIFICANT CHANGE UP (ref 80–100)
MONOCYTES # BLD AUTO: 0.77 K/UL — SIGNIFICANT CHANGE UP (ref 0–0.9)
MONOCYTES NFR BLD AUTO: 6.9 % — SIGNIFICANT CHANGE UP (ref 2–14)
NEUTROPHILS # BLD AUTO: 8.93 K/UL — HIGH (ref 1.8–7.4)
NEUTROPHILS NFR BLD AUTO: 79.7 % — HIGH (ref 43–77)
NRBC # BLD: 0 /100 WBCS — SIGNIFICANT CHANGE UP (ref 0–0)
PHOSPHATE SERPL-MCNC: 2.9 MG/DL — SIGNIFICANT CHANGE UP (ref 2.5–4.5)
PLATELET # BLD AUTO: 359 K/UL — SIGNIFICANT CHANGE UP (ref 150–400)
POTASSIUM SERPL-MCNC: 3.4 MMOL/L — LOW (ref 3.5–5.3)
POTASSIUM SERPL-SCNC: 3.4 MMOL/L — LOW (ref 3.5–5.3)
PROT SERPL-MCNC: 6.7 GM/DL — SIGNIFICANT CHANGE UP (ref 6–8.3)
PROTHROM AB SERPL-ACNC: 34.2 SEC — HIGH (ref 9.8–12.7)
RBC # BLD: 4.18 M/UL — SIGNIFICANT CHANGE UP (ref 3.8–5.2)
RBC # FLD: 13.4 % — SIGNIFICANT CHANGE UP (ref 10.3–14.5)
SODIUM SERPL-SCNC: 137 MMOL/L — SIGNIFICANT CHANGE UP (ref 135–145)
WBC # BLD: 11.2 K/UL — HIGH (ref 3.8–10.5)
WBC # FLD AUTO: 11.2 K/UL — HIGH (ref 3.8–10.5)

## 2018-10-03 PROCEDURE — 99221 1ST HOSP IP/OBS SF/LOW 40: CPT

## 2018-10-03 RX ORDER — OXYCODONE AND ACETAMINOPHEN 5; 325 MG/1; MG/1
2 TABLET ORAL ONCE
Qty: 0 | Refills: 0 | Status: DISCONTINUED | OUTPATIENT
Start: 2018-10-03 | End: 2018-10-03

## 2018-10-03 RX ORDER — FUROSEMIDE 40 MG
40 TABLET ORAL EVERY 12 HOURS
Qty: 0 | Refills: 0 | Status: DISCONTINUED | OUTPATIENT
Start: 2018-10-03 | End: 2018-10-08

## 2018-10-03 RX ORDER — COLLAGENASE CLOSTRIDIUM HIST. 250 UNIT/G
1 OINTMENT (GRAM) TOPICAL DAILY
Qty: 0 | Refills: 0 | Status: DISCONTINUED | OUTPATIENT
Start: 2018-10-03 | End: 2018-10-11

## 2018-10-03 RX ORDER — VANCOMYCIN HCL 1 G
1750 VIAL (EA) INTRAVENOUS EVERY 12 HOURS
Qty: 0 | Refills: 0 | Status: DISCONTINUED | OUTPATIENT
Start: 2018-10-03 | End: 2018-10-09

## 2018-10-03 RX ORDER — FLUCONAZOLE 150 MG/1
150 TABLET ORAL ONCE
Qty: 0 | Refills: 0 | Status: COMPLETED | OUTPATIENT
Start: 2018-10-03 | End: 2018-10-03

## 2018-10-03 RX ORDER — OXYCODONE AND ACETAMINOPHEN 5; 325 MG/1; MG/1
1 TABLET ORAL EVERY 6 HOURS
Qty: 0 | Refills: 0 | Status: DISCONTINUED | OUTPATIENT
Start: 2018-10-03 | End: 2018-10-10

## 2018-10-03 RX ORDER — SIMVASTATIN 20 MG/1
5 TABLET, FILM COATED ORAL AT BEDTIME
Qty: 0 | Refills: 0 | Status: DISCONTINUED | OUTPATIENT
Start: 2018-10-03 | End: 2018-10-11

## 2018-10-03 RX ORDER — CEFEPIME 1 G/1
2000 INJECTION, POWDER, FOR SOLUTION INTRAMUSCULAR; INTRAVENOUS EVERY 12 HOURS
Qty: 0 | Refills: 0 | Status: DISCONTINUED | OUTPATIENT
Start: 2018-10-03 | End: 2018-10-11

## 2018-10-03 RX ADMIN — Medication 2: at 08:50

## 2018-10-03 RX ADMIN — GABAPENTIN 300 MILLIGRAM(S): 400 CAPSULE ORAL at 05:47

## 2018-10-03 RX ADMIN — OXYCODONE AND ACETAMINOPHEN 1 TABLET(S): 5; 325 TABLET ORAL at 16:30

## 2018-10-03 RX ADMIN — OXYCODONE HYDROCHLORIDE 10 MILLIGRAM(S): 5 TABLET ORAL at 07:15

## 2018-10-03 RX ADMIN — Medication 250 MILLIGRAM(S): at 18:46

## 2018-10-03 RX ADMIN — OXYCODONE AND ACETAMINOPHEN 1 TABLET(S): 5; 325 TABLET ORAL at 22:32

## 2018-10-03 RX ADMIN — Medication 250 MILLIGRAM(S): at 05:57

## 2018-10-03 RX ADMIN — OXYCODONE AND ACETAMINOPHEN 2 TABLET(S): 5; 325 TABLET ORAL at 01:42

## 2018-10-03 RX ADMIN — CEFEPIME 1000 MILLIGRAM(S): 1 INJECTION, POWDER, FOR SOLUTION INTRAMUSCULAR; INTRAVENOUS at 05:57

## 2018-10-03 RX ADMIN — Medication 81 MILLIGRAM(S): at 12:17

## 2018-10-03 RX ADMIN — Medication 325 MILLIGRAM(S): at 12:17

## 2018-10-03 RX ADMIN — Medication 40 MILLIEQUIVALENT(S): at 12:17

## 2018-10-03 RX ADMIN — MONTELUKAST 10 MILLIGRAM(S): 4 TABLET, CHEWABLE ORAL at 12:17

## 2018-10-03 RX ADMIN — SODIUM CHLORIDE 3 MILLILITER(S): 9 INJECTION INTRAMUSCULAR; INTRAVENOUS; SUBCUTANEOUS at 14:08

## 2018-10-03 RX ADMIN — CEFEPIME 100 MILLIGRAM(S): 1 INJECTION, POWDER, FOR SOLUTION INTRAMUSCULAR; INTRAVENOUS at 17:57

## 2018-10-03 RX ADMIN — Medication 100 MILLIGRAM(S): at 05:47

## 2018-10-03 RX ADMIN — SODIUM CHLORIDE 3 MILLILITER(S): 9 INJECTION INTRAMUSCULAR; INTRAVENOUS; SUBCUTANEOUS at 21:28

## 2018-10-03 RX ADMIN — Medication 2: at 12:16

## 2018-10-03 RX ADMIN — Medication 9 MILLIGRAM(S): at 05:46

## 2018-10-03 RX ADMIN — OXYCODONE HYDROCHLORIDE 10 MILLIGRAM(S): 5 TABLET ORAL at 06:40

## 2018-10-03 RX ADMIN — FLUCONAZOLE 150 MILLIGRAM(S): 150 TABLET ORAL at 21:38

## 2018-10-03 RX ADMIN — ONDANSETRON 4 MILLIGRAM(S): 8 TABLET, FILM COATED ORAL at 06:03

## 2018-10-03 RX ADMIN — Medication 40 MILLIGRAM(S): at 12:17

## 2018-10-03 RX ADMIN — SIMVASTATIN 5 MILLIGRAM(S): 20 TABLET, FILM COATED ORAL at 21:37

## 2018-10-03 NOTE — PROGRESS NOTE ADULT - SUBJECTIVE AND OBJECTIVE BOX
HOSPITALIST PROGRESS NOTE:  SUBJECTIVE:  PCP:  Chief Complaint: Patient is a 68y old  Female who presents with a chief complaint of LE reddness/pain (02 Oct 2018 18:47)      HPI:  67 y/o female w/ PMH Afib on coumadin, T2D, UC, PVC sent in by vascular surg in [facility] b/c worsening right LE redness/edema.  patient refuses to go to [facility] therefore came back here as she  was hospitalized in 2018 for same issue, treated with iv antibiotics. had debridement done 2 weeks ago.  associated with severe pain, yellowish discharge  denies any fever/chills, headache, chest pain, palpitations, nausea/vomiting.   In ED, s/p cefepime/vanco, doppler done-no dvt but limited study on right lower leg.    10/3:  Above reviewed;      Allergies:  penicillin (Hives)    REVIEW OF SYSTEMS:  See HPI. All other review of systems is negative unless indicated above.     OBJECTIVE  Physical Exam:  Vital Signs:    Weight (kg): 122.5 (10-02 @ 12:07)  Vital Signs Last 24 Hrs  T(C): 36.6 (03 Oct 2018 05:12), Max: 36.9 (02 Oct 2018 12:33)  T(F): 97.8 (03 Oct 2018 05:12), Max: 98.5 (02 Oct 2018 12:33)  HR: 95 (03 Oct 2018 05:12) (85 - 95)  BP: 132/73 (03 Oct 2018 05:12) (107/70 - 140/76)  BP(mean): --  RR: 18 (03 Oct 2018 05:12) (17 - 18)  SpO2: 94% (03 Oct 2018 05:12) (94% - 98%)  I&O's Summary      Constitutional: NAD, awake and alert, well-developed  Neurological: AAO x 3, no focal deficits  HEENT: PERRLA, EOMI, MMM  Neck: Soft and supple, No LAD, No JVD  Respiratory: Breath sounds are clear bilaterally, No wheezing, rales or rhonchi  Cardiovascular: S1 and S2, regular rate and rhythm; no Murmurs, gallops or rubs  Gastrointestinal: Bowel Sounds present, soft, nontender, nondistended, no guarding, no rebound tenderness  Back: No CVA tenderness   Extremities:  +chronic venous stasis changes with reddness bilaterally.  +posterior ulcer with granulation tissue on right lower leg.  no discharge, bleeding or malodor.  +tender to touch  Vascular: 2+ peripheral pulses  Musculoskeletal: 5/5 strength b/l upper and lower extremities  Skin: No rashes  Breast: Deferred   Rectal: Deferred    MEDICATIONS  (STANDING):  aspirin  chewable 81 milliGRAM(s) Oral daily  atorvastatin 20 milliGRAM(s) Oral at bedtime  buDESOnide    EC Capsule 9 milliGRAM(s) Oral daily  cefepime  Injectable. 1000 milliGRAM(s) IV Push every 12 hours  dextrose 5%. 1000 milliLiter(s) (50 mL/Hr) IV Continuous <Continuous>  dextrose 50% Injectable 12.5 Gram(s) IV Push once  dextrose 50% Injectable 25 Gram(s) IV Push once  dextrose 50% Injectable 25 Gram(s) IV Push once  dicyclomine 10 milliGRAM(s) Oral daily  diltiazem    Tablet 120 milliGRAM(s) Oral two times a day  ferrous    sulfate 325 milliGRAM(s) Oral daily  furosemide    Tablet 40 milliGRAM(s) Oral daily  gabapentin 300 milliGRAM(s) Oral three times a day  insulin lispro (HumaLOG) corrective regimen sliding scale   SubCutaneous three times a day before meals  metoprolol succinate  milliGRAM(s) Oral daily  montelukast 10 milliGRAM(s) Oral daily  potassium chloride    Tablet ER 40 milliEquivalent(s) Oral daily  sodium chloride 0.9% lock flush 3 milliLiter(s) IV Push every 8 hours  vancomycin  IVPB 1000 milliGRAM(s) IV Intermittent every 12 hours  warfarin 5 milliGRAM(s) Oral daily      LABS: All Labs Reviewed:                        14.4   12.98 )-----------( 404      ( 02 Oct 2018 13:40 )             44.1     10    137  |  97  |  25<H>  ----------------------------<  123<H>  3.1<L>   |  30  |  0.75    Ca    9.0      02 Oct 2018 14:37    TPro  7.8  /  Alb  3.3  /  TBili  0.5  /  DBili  x   /  AST  15  /  ALT  34  /  AlkPhos  87  10    PT/INR - ( 02 Oct 2018 14:37 )   PT: 25.7 sec;   INR: 2.34 ratio         PTT - ( 02 Oct 2018 14:37 )  PTT:38.5 sec        Urinalysis Basic - ( 02 Oct 2018 14:50 )    Color: Yellow / Appearance: Clear / S.025 / pH: x  Gluc: x / Ketone: Trace  / Bili: Negative / Urobili: Negative mg/dL   Blood: x / Protein: 15 mg/dL / Nitrite: Negative   Leuk Esterase: Small / RBC: 3-5 /HPF / WBC 3-5   Sq Epi: x / Non Sq Epi: Moderate / Bacteria: Occasional        Blood Culture:       RADIOLOGY/EKG:    < from: Xray Chest 1 View- PORTABLE-Urgent (10.02.18 @ 14:37) >    Impression: No active disease.    < end of copied text >    < from: US Duplex Venous Lower Ext Complete, Bilateral (10.02.18 @ 15:36) >    IMPRESSION:   Unremarkable ultrasound of the right and left lower   extremity deep venous system.    Limited evaluation of the RIGHT   posterior tibial and peroneal veins and soleal and gastrocnemius veins   due to calf bandaging      < end of copied text > HOSPITALIST PROGRESS NOTE:  SUBJECTIVE:  PCP:  Chief Complaint: Patient is a 68y old  Female who presents with a chief complaint of LE reddness/pain (02 Oct 2018 18:47)      HPI:  69 y/o female w/ PMH Afib on coumadin, T2D, UC, PVC sent in by vascular surg in [facility] b/c worsening right LE redness/edema.  patient refuses to go to [facility] therefore came back here as she  was hospitalized in 2018 for same issue, treated with iv antibiotics. had debridement done 2 weeks ago.  associated with severe pain, yellowish discharge  denies any fever/chills, headache, chest pain, palpitations, nausea/vomiting.   In ED, s/p cefepime/vanco, doppler done-no dvt but limited study on right lower leg.    10/3:  Above reviewed; C/o of LE pain R>L      Allergies:  penicillin (Hives)    REVIEW OF SYSTEMS:  See HPI. All other review of systems is negative unless indicated above.     OBJECTIVE  Physical Exam:  Vital Signs Last 24 Hrs  T(C): 36.5 (03 Oct 2018 10:53), Max: 36.8 (02 Oct 2018 20:42)  T(F): 97.7 (03 Oct 2018 10:53), Max: 98.3 (02 Oct 2018 20:42)  HR: 80 (03 Oct 2018 10:53) (80 - 95)  BP: 114/71 (03 Oct 2018 10:53) (107/70 - 132/73)  BP(mean): --  RR: 18 (03 Oct 2018 10:53) (17 - 18)  SpO2: 100% (03 Oct 2018 10:53) (94% - 100%)  I&O's Summary    Constitutional: NAD, awake and alert, well-developed  Neurological: AAO x 3, no focal deficits  HEENT: PERRLA, EOMI, MMM  Neck: Soft and supple, No LAD, No JVD  Respiratory: Breath sounds are clear bilaterally, No wheezing, rales or rhonchi  Cardiovascular: S1 and S2, regular rate and rhythm; no Murmurs, gallops or rubs  Gastrointestinal: Bowel Sounds present, soft, nontender, nondistended, no guarding, no rebound tenderness  Back: No CVA tenderness   Extremities:  +chronic venous stasis changes with reddness bilaterally.  +posterior ulcer with granulation tissue on right lower leg.  no discharge, bleeding or malodor.  +tender to touch  Vascular: 2+ peripheral pulses  Musculoskeletal: 5/5 strength b/l upper and lower extremities  Skin: No rashes  Breast: Deferred   Rectal: Deferred    MEDICATIONS  (STANDING):  aspirin  chewable 81 milliGRAM(s) Oral daily  atorvastatin 20 milliGRAM(s) Oral at bedtime  buDESOnide    EC Capsule 9 milliGRAM(s) Oral daily  cefepime  Injectable. 1000 milliGRAM(s) IV Push every 12 hours  dextrose 5%. 1000 milliLiter(s) (50 mL/Hr) IV Continuous <Continuous>  dextrose 50% Injectable 12.5 Gram(s) IV Push once  dextrose 50% Injectable 25 Gram(s) IV Push once  dextrose 50% Injectable 25 Gram(s) IV Push once  dicyclomine 10 milliGRAM(s) Oral daily  diltiazem    Tablet 120 milliGRAM(s) Oral two times a day  ferrous    sulfate 325 milliGRAM(s) Oral daily  furosemide    Tablet 40 milliGRAM(s) Oral daily  gabapentin 300 milliGRAM(s) Oral three times a day  insulin lispro (HumaLOG) corrective regimen sliding scale   SubCutaneous three times a day before meals  metoprolol succinate  milliGRAM(s) Oral daily  montelukast 10 milliGRAM(s) Oral daily  potassium chloride    Tablet ER 40 milliEquivalent(s) Oral daily  sodium chloride 0.9% lock flush 3 milliLiter(s) IV Push every 8 hours  vancomycin  IVPB 1000 milliGRAM(s) IV Intermittent every 12 hours  warfarin 5 milliGRAM(s) Oral daily    Lab Results:  CBC  CBC Full  -  ( 03 Oct 2018 07:31 )  WBC Count : 11.20 K/uL  Hemoglobin : 12.6 g/dL  Hematocrit : 39.2 %  Platelet Count - Automated : 359 K/uL  Mean Cell Volume : 93.8 fl  Mean Cell Hemoglobin : 30.1 pg  Mean Cell Hemoglobin Concentration : 32.1 gm/dL  Auto Neutrophil # : 8.93 K/uL  Auto Lymphocyte # : 1.26 K/uL  Auto Monocyte # : 0.77 K/uL  Auto Eosinophil # : 0.09 K/uL  Auto Basophil # : 0.08 K/uL  Auto Neutrophil % : 79.7 %  Auto Lymphocyte % : 11.3 %  Auto Monocyte % : 6.9 %  Auto Eosinophil % : 0.8 %  Auto Basophil % : 0.7 %    .		Differential:	[] Automated		[] Manual  Chemistry                        12.6   11.20 )-----------( 359      ( 03 Oct 2018 07:31 )             39.2     10-03    137  |  98  |  16  ----------------------------<  162<H>  3.4<L>   |  31  |  0.52    Ca    8.3<L>      03 Oct 2018 07:31  Phos  2.9     10-03  Mg     1.5     10-03    TPro  6.7  /  Alb  2.7<L>  /  TBili  0.5  /  DBili  x   /  AST  12<L>  /  ALT  26  /  AlkPhos  71  10-03    LIVER FUNCTIONS - ( 03 Oct 2018 07:31 )  Alb: 2.7 g/dL / Pro: 6.7 gm/dL / ALK PHOS: 71 U/L / ALT: 26 U/L / AST: 12 U/L / GGT: x           PT/INR - ( 02 Oct 2018 14:37 )   PT: 25.7 sec;   INR: 2.34 ratio         PTT - ( 02 Oct 2018 14:37 )  PTT:38.5 sec  Urinalysis Basic - ( 02 Oct 2018 14:50 )    Color: Yellow / Appearance: Clear / S.025 / pH: x  Gluc: x / Ketone: Trace  / Bili: Negative / Urobili: Negative mg/dL   Blood: x / Protein: 15 mg/dL / Nitrite: Negative   Leuk Esterase: Small / RBC: 3-5 /HPF / WBC 3-5   Sq Epi: x / Non Sq Epi: Moderate / Bacteria: Occasional        Urinalysis Basic - ( 02 Oct 2018 14:50 )    Color: Yellow / Appearance: Clear / S.025 / pH: x  Gluc: x / Ketone: Trace  / Bili: Negative / Urobili: Negative mg/dL   Blood: x / Protein: 15 mg/dL / Nitrite: Negative   Leuk Esterase: Small / RBC: 3-5 /HPF / WBC 3-5   Sq Epi: x / Non Sq Epi: Moderate / Bacteria: Occasional      RADIOLOGY/EKG:    < from: Xray Chest 1 View- PORTABLE-Urgent (10.02.18 @ 14:37) >    Impression: No active disease.    < end of copied text >    < from: US Duplex Venous Lower Ext Complete, Bilateral (10.02.18 @ 15:36) >    IMPRESSION:   Unremarkable ultrasound of the right and left lower   extremity deep venous system.    Limited evaluation of the RIGHT   posterior tibial and peroneal veins and soleal and gastrocnemius veins   due to calf bandaging      < end of copied text >

## 2018-10-03 NOTE — CONSULT NOTE ADULT - SUBJECTIVE AND OBJECTIVE BOX
Patient is a 68y old  Female who presents with a chief complaint of LE redness/pain (03 Oct 2018 07:59)    HPI:  69 y/o female with h/o A.fib on coumadin, T2D, UC, PVC was admitted on 10/2 for right lower extremity redness. She was referred to the hospital by vascular surgeon in [place] for worsening right LE redness/edema. The patient was hospitalized in 8/2018 for similar issue, treated with IV antibiotics. She reports a debridement done 2 weeks ago. She has severe RLE pain, yellowish discharge. Denies fever or chills at home. Seen by ID here at , currently on Vanco and Cefepime. Discussed previous treatment which included UNNA boot, however patient reports that she cannot tolerate them due to pain.   We discussed her treatment plan which will include antibiotics, pain control and control of swelling and venous stasis with compression therapy.   Pt understands and agrees to plan of care.     Vital Signs Last 24 Hrs  T(C): 36.5 (03 Oct 2018 10:53), Max: 36.8 (02 Oct 2018 20:42)  T(F): 97.7 (03 Oct 2018 10:53), Max: 98.3 (02 Oct 2018 20:42)  HR: 80 (03 Oct 2018 10:53) (80 - 95)  BP: 114/71 (03 Oct 2018 10:53) (107/70 - 140/76)  BP(mean): --  RR: 18 (03 Oct 2018 10:53) (17 - 18)  SpO2: 100% (03 Oct 2018 10:53) (94% - 100%)  PE:    Constitutional: frail looking  HEENT: NC/AT, EOMI, PERRLA, conjunctivae clear; ears and nose atraumatic; pharynx benign  Neck: supple; thyroid not palpable  Back: no tenderness  Respiratory: respiratory effort normal; clear to auscultation  Cardiovascular: S1S2 regular, no murmurs  Abdomen: soft, not tender, not distended, positive BS; no liver or spleen organomegaly, obese abdomen  Genitourinary: no suprapubic tenderness  Lymphatic: no LN palpable  Musculoskeletal: no muscle tenderness, no joint swelling or tenderness  Extremities: 2 + pedal edema, however palpable pulses bilaterally  RLE calf ulcer with scant discharge; right calf brown discoloration and edema; severe tenderness  Neurological/ Psychiatric: AxOx3, judgement and insight normal; moving all extremities  Skin: no rashes; no palpable lesions    Labs: all available labs reviewed                        12.6   11.20 )-----------( 359      ( 03 Oct 2018 07:31 )             39.2     10-03    137  |  98  |  16  ----------------------------<  162<H>  3.4<L>   |  31  |  0.52    Ca    8.3<L>      03 Oct 2018 07:31  Phos  2.9     10-03  Mg     1.5     10-03    TPro  6.7  /  Alb  2.7<L>  /  TBili  0.5  /  DBili  x   /  AST  12<L>  /  ALT  26  /  AlkPhos  71  10-03

## 2018-10-03 NOTE — CONSULT NOTE ADULT - ASSESSMENT
67 y/o female with h/o A.fib on coumadin, T2D, UC, PVC was admitted on 10/2 for left lower extremity redness. She was referred to the hospital by vascular surgeon in [place] for worsening right LE redness/edema. The patient was hospitalized in 8/2018 for similar issue, treated with IV antibiotics. She reports a debridement done 2 weeks PTA. She has severe LLE pain, yellowish discharge. Denies fever or chills at home. In ER she received vancomycin IV ans cefepime.      1. B/l lower extremities chronic stasis dermatitis with superimposed RLE cellulitis. PVD. Allergy to PCN.   -obtain BC x 2  -start vancomycin 1.5 gm IV q12h and cefepime 2 gm IV q12h  -reason for abx use and side effects reviewed with patient; monitor BMP and vancomycin trough levels   -monitor closely in ej of PCN allergy history  -elevate legs  -old chart reviewed to assess prior cultures  -monitor temps  -f/u CBC  -supportive care  2. Other issues:   -care per medicine 67 y/o female with h/o A.fib on coumadin, T2D, UC, PVC was admitted on 10/2 for left lower extremity redness. She was referred to the hospital by vascular surgeon in [place] for worsening right LE redness/edema. The patient was hospitalized in 8/2018 for similar issue, treated with IV antibiotics. She reports a debridement done 2 weeks PTA. She has severe LLE pain, yellowish discharge. Denies fever or chills at home. In ER she received vancomycin IV ans cefepime.      1. B/l lower extremities chronic stasis dermatitis with superimposed RLE cellulitis. PVD. Allergy to PCN.   -mild leukocytosis  -obtain BC x 2  -start vancomycin 1.750 gm IV q12h and cefepime 2 gm IV q12h  -reason for abx use and side effects reviewed with patient; monitor BMP and vancomycin trough levels   -monitor closely in ej of PCN allergy history  -elevate legs  -old chart reviewed to assess prior cultures  -victor manuel management  -monitor temps  -f/u CBC  -supportive care  2. Other issues:   -care per medicine

## 2018-10-03 NOTE — CONSULT NOTE ADULT - SUBJECTIVE AND OBJECTIVE BOX
Patient is a 68y old  Female who presents with a chief complaint of LE reddness/pain (03 Oct 2018 07:59)    HPI:  69 y/o female with h/o A.fib on coumadin, T2D, UC, PVC was admitted on 10/2 for left lower extremity redness. She was referred to the hospital by vascular surgeon in [place] for worsening right LE redness/edema. The patient was hospitalized in 2018 for similar issue, treated with IV antibiotics. She reports a debridement done 2 weeks PTA. She has severe LLE pain, yellowish discharge. Denies fever or chills at home. In ER she received vancomycin IV ans cefepime.     PMH: as above  Recent hospitalization in Aug 2018 for LE cellulitis - treated with vancomycin IV and cefepime  PSH: as above  Meds: per reconciliation sheet, noted below  MEDICATIONS  (STANDING):  aspirin  chewable 81 milliGRAM(s) Oral daily  buDESOnide    EC Capsule 9 milliGRAM(s) Oral daily  cefepime  Injectable. 1000 milliGRAM(s) IV Push every 12 hours  dextrose 5%. 1000 milliLiter(s) (50 mL/Hr) IV Continuous <Continuous>  dextrose 50% Injectable 12.5 Gram(s) IV Push once  dextrose 50% Injectable 25 Gram(s) IV Push once  dextrose 50% Injectable 25 Gram(s) IV Push once  diltiazem    Tablet 120 milliGRAM(s) Oral two times a day  ferrous    sulfate 325 milliGRAM(s) Oral daily  furosemide    Tablet 40 milliGRAM(s) Oral daily  insulin lispro (HumaLOG) corrective regimen sliding scale   SubCutaneous three times a day before meals  metoprolol succinate  milliGRAM(s) Oral daily  montelukast 10 milliGRAM(s) Oral daily  potassium chloride    Tablet ER 40 milliEquivalent(s) Oral daily  simvastatin 5 milliGRAM(s) Oral at bedtime  sodium chloride 0.9% lock flush 3 milliLiter(s) IV Push every 8 hours  vancomycin  IVPB 1000 milliGRAM(s) IV Intermittent every 12 hours  warfarin 5 milliGRAM(s) Oral daily    MEDICATIONS  (PRN):  dextrose 40% Gel 15 Gram(s) Oral once PRN Blood Glucose LESS THAN 70 milliGRAM(s)/deciliter  glucagon  Injectable 1 milliGRAM(s) IntraMuscular once PRN Glucose LESS THAN 70 milligrams/deciliter  oxyCODONE    IR 10 milliGRAM(s) Oral every 6 hours PRN Severe Pain (7 - 10)    Allergies    penicillin (Hives)    Intolerances      Social: no smoking, no alcohol, no illegal drugs; no recent travel, no exposure to TB  FAMILY HISTORY:  Family history of breast cancer in mother (Mother)    no history of premature cardiovascular disease in first degree relatives    ROS: the patient denies fever, no chills, no HA, no seizures, no dizziness, no sore throat, no nasal congestion, no blurry vision, no CP, no palpitations, no SOB, no cough, no abdominal pain, no diarrhea, no N/V, no dysuria, no leg pain, no claudication, no rash, no joint aches, no rectal pain or bleeding, no night sweats  All other systems reviewed and are negative    Vital Signs Last 24 Hrs  T(C): 36.5 (03 Oct 2018 10:53), Max: 36.8 (02 Oct 2018 20:42)  T(F): 97.7 (03 Oct 2018 10:53), Max: 98.3 (02 Oct 2018 20:42)  HR: 80 (03 Oct 2018 10:53) (80 - 95)  BP: 114/71 (03 Oct 2018 10:53) (107/70 - 140/76)  BP(mean): --  RR: 18 (03 Oct 2018 10:53) (17 - 18)  SpO2: 100% (03 Oct 2018 10:53) (94% - 100%)  Daily     Daily Weight in k.5 (03 Oct 2018 08:39)    PE:    Constitutional: frail looking  HEENT: NC/AT, EOMI, PERRLA, conjunctivae clear; ears and nose atraumatic; pharynx benign  Neck: supple; thyroid not palpable  Back: no tenderness  Respiratory: respiratory effort normal; clear to auscultation  Cardiovascular: S1S2 regular, no murmurs  Abdomen: soft, not tender, not distended, positive BS; no liver or spleen organomegaly  Genitourinary: no suprapubic tenderness  Lymphatic: no LN palpable  Musculoskeletal: no muscle tenderness, no joint swelling or tenderness  Extremities: no pedal edema  Neurological/ Psychiatric: AxOx3, judgement and insight normal; moving all extremities  Skin: no rashes; no palpable lesions    Labs: all available labs reviewed                         )-----------( 359      ( 03 Oct 2018 07:31 )             39.2     10-    137  |  98  |  16  ----------------------------<  162<H>  3.4<L>   |  31  |  0.52    Ca    8.3<L>      03 Oct 2018 07:31  Phos  2.9     10-  Mg     1.5     10-03    TPro  6.7  /  Alb  2.7<L>  /  TBili  0.5  /  DBili  x   /  AST  12<L>  /  ALT  26  /  AlkPhos  71  10     LIVER FUNCTIONS - ( 03 Oct 2018 07:31 )  Alb: 2.7 g/dL / Pro: 6.7 gm/dL / ALK PHOS: 71 U/L / ALT: 26 U/L / AST: 12 U/L / GGT: x           Urinalysis Basic - ( 02 Oct 2018 14:50 )    Color: Yellow / Appearance: Clear / S.025 / pH: x  Gluc: x / Ketone: Trace  / Bili: Negative / Urobili: Negative mg/dL   Blood: x / Protein: 15 mg/dL / Nitrite: Negative   Leuk Esterase: Small / RBC: 3-5 /HPF / WBC 3-5   Sq Epi: x / Non Sq Epi: Moderate / Bacteria: Occasional          Radiology: all available radiological tests reviewed    Advanced directives addressed: full resuscitation Patient is a 68y old  Female who presents with a chief complaint of LE reddness/pain (03 Oct 2018 07:59)    HPI:  67 y/o female with h/o A.fib on coumadin, T2D, UC, PVC was admitted on 10/2 for right lower extremity redness. She was referred to the hospital by vascular surgeon in [place] for worsening right LE redness/edema. The patient was hospitalized in 2018 for similar issue, treated with IV antibiotics. She reports a debridement done 2 weeks PTA. She has severe RLE pain, yellowish discharge. Denies fever or chills at home. In ER she received vancomycin IV and cefepime.     PMH: as above  Recent hospitalization in Aug 2018 for LE cellulitis - treated with vancomycin IV and cefepime  PSH: as above  Meds: per reconciliation sheet, noted below  MEDICATIONS  (STANDING):  aspirin  chewable 81 milliGRAM(s) Oral daily  buDESOnide    EC Capsule 9 milliGRAM(s) Oral daily  cefepime  Injectable. 1000 milliGRAM(s) IV Push every 12 hours  dextrose 5%. 1000 milliLiter(s) (50 mL/Hr) IV Continuous <Continuous>  dextrose 50% Injectable 12.5 Gram(s) IV Push once  dextrose 50% Injectable 25 Gram(s) IV Push once  dextrose 50% Injectable 25 Gram(s) IV Push once  diltiazem    Tablet 120 milliGRAM(s) Oral two times a day  ferrous    sulfate 325 milliGRAM(s) Oral daily  furosemide    Tablet 40 milliGRAM(s) Oral daily  insulin lispro (HumaLOG) corrective regimen sliding scale   SubCutaneous three times a day before meals  metoprolol succinate  milliGRAM(s) Oral daily  montelukast 10 milliGRAM(s) Oral daily  potassium chloride    Tablet ER 40 milliEquivalent(s) Oral daily  simvastatin 5 milliGRAM(s) Oral at bedtime  sodium chloride 0.9% lock flush 3 milliLiter(s) IV Push every 8 hours  vancomycin  IVPB 1000 milliGRAM(s) IV Intermittent every 12 hours  warfarin 5 milliGRAM(s) Oral daily    MEDICATIONS  (PRN):  dextrose 40% Gel 15 Gram(s) Oral once PRN Blood Glucose LESS THAN 70 milliGRAM(s)/deciliter  glucagon  Injectable 1 milliGRAM(s) IntraMuscular once PRN Glucose LESS THAN 70 milligrams/deciliter  oxyCODONE    IR 10 milliGRAM(s) Oral every 6 hours PRN Severe Pain (7 - 10)    Allergies    penicillin (Hives)    Intolerances      Social: no smoking, no alcohol, no illegal drugs; no recent travel, no exposure to TB  FAMILY HISTORY:  Family history of breast cancer in mother (Mother)    no history of premature cardiovascular disease in first degree relatives    ROS: the patient denies fever, no chills, no HA, no seizures, no dizziness, no sore throat, no nasal congestion, no blurry vision, no CP, no palpitations, no SOB, no cough, no abdominal pain, no diarrhea, no N/V, no dysuria, has right leg pain, no claudication, has b/l LE rash, no joint aches, no rectal pain or bleeding, no night sweats  All other systems reviewed and are negative    Vital Signs Last 24 Hrs  T(C): 36.5 (03 Oct 2018 10:53), Max: 36.8 (02 Oct 2018 20:42)  T(F): 97.7 (03 Oct 2018 10:53), Max: 98.3 (02 Oct 2018 20:42)  HR: 80 (03 Oct 2018 10:53) (80 - 95)  BP: 114/71 (03 Oct 2018 10:53) (107/70 - 140/76)  BP(mean): --  RR: 18 (03 Oct 2018 10:53) (17 - 18)  SpO2: 100% (03 Oct 2018 10:53) (94% - 100%)  Daily     Daily Weight in k.5 (03 Oct 2018 08:39)    PE:    Constitutional: frail looking  HEENT: NC/AT, EOMI, PERRLA, conjunctivae clear; ears and nose atraumatic; pharynx benign  Neck: supple; thyroid not palpable  Back: no tenderness  Respiratory: respiratory effort normal; clear to auscultation  Cardiovascular: S1S2 regular, no murmurs  Abdomen: soft, not tender, not distended, positive BS; no liver or spleen organomegaly  Genitourinary: no suprapubic tenderness  Lymphatic: no LN palpable  Musculoskeletal: no muscle tenderness, no joint swelling or tenderness  Extremities: 2 + pedal edema  RLE calf ulcer with scant discharge; right calf brown discoloration and edema; severe tenderness  Neurological/ Psychiatric: AxOx3, judgement and insight normal; moving all extremities  Skin: no rashes; no palpable lesions    Labs: all available labs reviewed                        12.6   11.20 )-----------( 359      ( 03 Oct 2018 07:31 )             39.2     10-03    137  |  98  |  16  ----------------------------<  162<H>  3.4<L>   |  31  |  0.52    Ca    8.3<L>      03 Oct 2018 07:31  Phos  2.9     10-  Mg     1.5     10-03    TPro  6.7  /  Alb  2.7<L>  /  TBili  0.5  /  DBili  x   /  AST  12<L>  /  ALT  26  /  AlkPhos  71  10-03     LIVER FUNCTIONS - ( 03 Oct 2018 07:31 )  Alb: 2.7 g/dL / Pro: 6.7 gm/dL / ALK PHOS: 71 U/L / ALT: 26 U/L / AST: 12 U/L / GGT: x           Urinalysis Basic - ( 02 Oct 2018 14:50 )    Color: Yellow / Appearance: Clear / S.025 / pH: x  Gluc: x / Ketone: Trace  / Bili: Negative / Urobili: Negative mg/dL   Blood: x / Protein: 15 mg/dL / Nitrite: Negative   Leuk Esterase: Small / RBC: 3-5 /HPF / WBC 3-5   Sq Epi: x / Non Sq Epi: Moderate / Bacteria: Occasional          Radiology: all available radiological tests reviewed    Advanced directives addressed: full resuscitation

## 2018-10-03 NOTE — CONSULT NOTE ADULT - CONSULT REASON
Evaluation of left lower extremity venous stasis ulceration Evaluation of right lower extremity venous stasis ulceration

## 2018-10-03 NOTE — PROGRESS NOTE ADULT - ASSESSMENT
right LE cellulitis/ulcer with chronic venous stasis changes bilaterally  -US - No DVT  -cont cefepime/vanco  -id consult   -vascular consult   -pain meds as needed     paroxysmal afib, inr- therapeutic   -rate controlled on CCB, BB  -cont coumadin    chronic diastolic CHF, euvolemic  -lasix  po  -low Na diet    NIDDM  -hold home po meds   -bgm and sliding scale    abnormal LFTs on previous admission, resolved   - US abd  - fatty liver, nonspecific lesion right lobe and left lobe not seen on prior ultrasound and  CBD dilated new from 2016   - normal TB;  trend lfts, check lipid panel- normal   - will need close GI follow up and MRI  as ouptatient     dvt px  -on coumadin B/l lower extremities chronic stasis dermatitis with superimposed RLE cellulitis   -Leukocytosis  -US - No DVT  -cont cefepime/vanco  -Start lasix 40mg IV Q12H  -id consult appreciated   -vascular consult appreciated  - will need daily local wound care with collagenase, xeroform, 4x4 kerlix and ace bandage for compression  -pain meds as needed   -elevated Legs    paroxysmal afib, inr- therapeutic   -rate controlled on CCB, BB  -cont coumadin    chronic diastolic CHF, euvolemic  -lasix  IV for LE edema  -low Na diet    NIDDM  -hold home po meds   -bgm and sliding scale    abnormal LFTs on previous admission, resolved   - US abd  - fatty liver, nonspecific lesion right lobe and left lobe not seen on prior ultrasound and  CBD dilated new from 2016   - normal TB;  trend lfts, check lipid panel- normal   - will need close GI follow up and MRI  as ouptatient     dvt px  -on coumadin

## 2018-10-03 NOTE — CONSULT NOTE ADULT - ASSESSMENT
67 y/o female with multiple comorbidities with venous stasis ulcer of the left lower extremity and bilateral venous stasis dermatitis.    pain control as needed  Continue to follow ID recommendations - vancomycin 1.750 gm IV q12h and cefepime 2 gm IV q12h  -elevate legs  -monitor temps  -f/u labs including cbc vancomycin trough after 4th dose and bmp  -will need daily local wound care with collagenase, xeroform, 4x4 kerlix and ace bandage for compression  - Will need regular outpatient follow up when discharged  - will continue to follow along     Discussed with Dr. Ivey. 67 y/o female with multiple comorbidities with venous stasis ulcer of the right lower extremity and bilateral venous stasis dermatitis.    pain control as needed  Continue to follow ID recommendations - vancomycin 1.750 gm IV q12h and cefepime 2 gm IV q12h  -elevate legs  -monitor temps  -f/u labs including cbc vancomycin trough after 4th dose and bmp  -will need daily local wound care with collagenase, xeroform, 4x4 kerlix and ace bandage for compression  - Will need regular outpatient follow up when discharged  - will continue to follow along     Discussed with Dr. Ivey.

## 2018-10-03 NOTE — PROVIDER CONTACT NOTE (OTHER) - SITUATION
spoke with Dulce, office aware of consult
called md service spoke with jennifer she will pass the message along
called md service they will pass the meassage

## 2018-10-03 NOTE — PHARMACOTHERAPY INTERVENTION NOTE - COMMENTS
med rec updated on 5S.
Completed med rec. Home medications reviewed with patient and son.
Vancomycin trough level ordered for Friday am before 6am dose

## 2018-10-04 LAB
-  AMIKACIN: SIGNIFICANT CHANGE UP
-  AZTREONAM: SIGNIFICANT CHANGE UP
-  CEFEPIME: SIGNIFICANT CHANGE UP
-  CEFTAZIDIME: SIGNIFICANT CHANGE UP
-  CIPROFLOXACIN: SIGNIFICANT CHANGE UP
-  GENTAMICIN: SIGNIFICANT CHANGE UP
-  IMIPENEM: SIGNIFICANT CHANGE UP
-  LEVOFLOXACIN: SIGNIFICANT CHANGE UP
-  MEROPENEM: SIGNIFICANT CHANGE UP
-  PIPERACILLIN/TAZOBACTAM: SIGNIFICANT CHANGE UP
-  TOBRAMYCIN: SIGNIFICANT CHANGE UP
ANION GAP SERPL CALC-SCNC: 10 MMOL/L — SIGNIFICANT CHANGE UP (ref 5–17)
BUN SERPL-MCNC: 15 MG/DL — SIGNIFICANT CHANGE UP (ref 7–23)
CALCIUM SERPL-MCNC: 8.3 MG/DL — LOW (ref 8.5–10.1)
CHLORIDE SERPL-SCNC: 98 MMOL/L — SIGNIFICANT CHANGE UP (ref 96–108)
CO2 SERPL-SCNC: 26 MMOL/L — SIGNIFICANT CHANGE UP (ref 22–31)
CREAT SERPL-MCNC: 0.72 MG/DL — SIGNIFICANT CHANGE UP (ref 0.5–1.3)
GLUCOSE BLDC GLUCOMTR-MCNC: 150 MG/DL — HIGH (ref 70–99)
GLUCOSE BLDC GLUCOMTR-MCNC: 151 MG/DL — HIGH (ref 70–99)
GLUCOSE BLDC GLUCOMTR-MCNC: 157 MG/DL — HIGH (ref 70–99)
GLUCOSE BLDC GLUCOMTR-MCNC: 219 MG/DL — HIGH (ref 70–99)
GLUCOSE SERPL-MCNC: 201 MG/DL — HIGH (ref 70–99)
HCT VFR BLD CALC: 42.2 % — SIGNIFICANT CHANGE UP (ref 34.5–45)
HGB BLD-MCNC: 13.7 G/DL — SIGNIFICANT CHANGE UP (ref 11.5–15.5)
INR BLD: 2.37 RATIO — HIGH (ref 0.88–1.16)
MAGNESIUM SERPL-MCNC: 1.5 MG/DL — LOW (ref 1.6–2.6)
MCHC RBC-ENTMCNC: 31 PG — SIGNIFICANT CHANGE UP (ref 27–34)
MCHC RBC-ENTMCNC: 32.5 GM/DL — SIGNIFICANT CHANGE UP (ref 32–36)
MCV RBC AUTO: 95.5 FL — SIGNIFICANT CHANGE UP (ref 80–100)
METHOD TYPE: SIGNIFICANT CHANGE UP
NRBC # BLD: 0 /100 WBCS — SIGNIFICANT CHANGE UP (ref 0–0)
PHOSPHATE SERPL-MCNC: 3.3 MG/DL — SIGNIFICANT CHANGE UP (ref 2.5–4.5)
PLATELET # BLD AUTO: 398 K/UL — SIGNIFICANT CHANGE UP (ref 150–400)
POTASSIUM SERPL-MCNC: 3.7 MMOL/L — SIGNIFICANT CHANGE UP (ref 3.5–5.3)
POTASSIUM SERPL-SCNC: 3.7 MMOL/L — SIGNIFICANT CHANGE UP (ref 3.5–5.3)
PROTHROM AB SERPL-ACNC: 26.1 SEC — HIGH (ref 9.8–12.7)
RBC # BLD: 4.42 M/UL — SIGNIFICANT CHANGE UP (ref 3.8–5.2)
RBC # FLD: 13.2 % — SIGNIFICANT CHANGE UP (ref 10.3–14.5)
SODIUM SERPL-SCNC: 134 MMOL/L — LOW (ref 135–145)
WBC # BLD: 13.11 K/UL — HIGH (ref 3.8–10.5)
WBC # FLD AUTO: 13.11 K/UL — HIGH (ref 3.8–10.5)

## 2018-10-04 RX ORDER — OXYCODONE AND ACETAMINOPHEN 5; 325 MG/1; MG/1
1 TABLET ORAL ONCE
Qty: 0 | Refills: 0 | Status: DISCONTINUED | OUTPATIENT
Start: 2018-10-04 | End: 2018-10-04

## 2018-10-04 RX ORDER — MAGNESIUM OXIDE 400 MG ORAL TABLET 241.3 MG
400 TABLET ORAL
Qty: 0 | Refills: 0 | Status: COMPLETED | OUTPATIENT
Start: 2018-10-04 | End: 2018-10-05

## 2018-10-04 RX ADMIN — Medication 1 APPLICATION(S): at 11:08

## 2018-10-04 RX ADMIN — OXYCODONE AND ACETAMINOPHEN 1 TABLET(S): 5; 325 TABLET ORAL at 10:59

## 2018-10-04 RX ADMIN — Medication 40 MILLIEQUIVALENT(S): at 11:00

## 2018-10-04 RX ADMIN — Medication 250 MILLIGRAM(S): at 17:37

## 2018-10-04 RX ADMIN — Medication 325 MILLIGRAM(S): at 11:00

## 2018-10-04 RX ADMIN — SIMVASTATIN 5 MILLIGRAM(S): 20 TABLET, FILM COATED ORAL at 22:33

## 2018-10-04 RX ADMIN — OXYCODONE AND ACETAMINOPHEN 1 TABLET(S): 5; 325 TABLET ORAL at 15:41

## 2018-10-04 RX ADMIN — SODIUM CHLORIDE 3 MILLILITER(S): 9 INJECTION INTRAMUSCULAR; INTRAVENOUS; SUBCUTANEOUS at 22:26

## 2018-10-04 RX ADMIN — Medication 250 MILLIGRAM(S): at 05:02

## 2018-10-04 RX ADMIN — MAGNESIUM OXIDE 400 MG ORAL TABLET 400 MILLIGRAM(S): 241.3 TABLET ORAL at 13:48

## 2018-10-04 RX ADMIN — WARFARIN SODIUM 5 MILLIGRAM(S): 2.5 TABLET ORAL at 22:33

## 2018-10-04 RX ADMIN — CEFEPIME 100 MILLIGRAM(S): 1 INJECTION, POWDER, FOR SOLUTION INTRAMUSCULAR; INTRAVENOUS at 17:37

## 2018-10-04 RX ADMIN — Medication 2: at 11:34

## 2018-10-04 RX ADMIN — OXYCODONE HYDROCHLORIDE 5 MILLIGRAM(S): 5 TABLET ORAL at 10:54

## 2018-10-04 RX ADMIN — Medication 4: at 08:19

## 2018-10-04 RX ADMIN — MAGNESIUM OXIDE 400 MG ORAL TABLET 400 MILLIGRAM(S): 241.3 TABLET ORAL at 17:28

## 2018-10-04 RX ADMIN — OXYCODONE AND ACETAMINOPHEN 1 TABLET(S): 5; 325 TABLET ORAL at 22:39

## 2018-10-04 RX ADMIN — Medication 9 MILLIGRAM(S): at 05:01

## 2018-10-04 RX ADMIN — Medication 40 MILLIGRAM(S): at 17:37

## 2018-10-04 RX ADMIN — Medication 40 MILLIGRAM(S): at 05:15

## 2018-10-04 RX ADMIN — CEFEPIME 100 MILLIGRAM(S): 1 INJECTION, POWDER, FOR SOLUTION INTRAMUSCULAR; INTRAVENOUS at 05:02

## 2018-10-04 RX ADMIN — SODIUM CHLORIDE 3 MILLILITER(S): 9 INJECTION INTRAMUSCULAR; INTRAVENOUS; SUBCUTANEOUS at 05:20

## 2018-10-04 RX ADMIN — Medication 2: at 17:28

## 2018-10-04 RX ADMIN — Medication 81 MILLIGRAM(S): at 11:00

## 2018-10-04 RX ADMIN — MONTELUKAST 10 MILLIGRAM(S): 4 TABLET, CHEWABLE ORAL at 22:33

## 2018-10-04 RX ADMIN — OXYCODONE AND ACETAMINOPHEN 1 TABLET(S): 5; 325 TABLET ORAL at 05:15

## 2018-10-04 RX ADMIN — Medication 100 MILLIGRAM(S): at 05:01

## 2018-10-04 NOTE — PROGRESS NOTE ADULT - ASSESSMENT
67 y/o female with h/o A.fib on coumadin, T2D, UC, PVC was admitted on 10/2 for left lower extremity redness. She was referred to the hospital by vascular surgeon in [place] for worsening right LE redness/edema. The patient was hospitalized in 8/2018 for similar issue, treated with IV antibiotics. She reports a debridement done 2 weeks PTA. She has severe LLE pain, yellowish discharge. Denies fever or chills at home. In ER she received vancomycin IV ans cefepime.      1. B/l lower extremities chronic stasis dermatitis with superimposed RLE cellulitis. PVD. Allergy to PCN.   -mild leukocytosis  -BC x 2  -urine culture shows a small amount of PSAE, but the patient has no dysuria and no pyuria  -on vancomycin 1.750 gm IV q12h and cefepime 2 gm IV q12h # 2  -tolerating abx well so far; no side effects noted  -obtain vancomycin trough level   -monitor closely in ej of PCN allergy history  -elevate legs  -continue abx coverage  -victor manuel management  -monitor temps  -f/u CBC  -supportive care  2. Other issues:   -care per medicine

## 2018-10-04 NOTE — PROGRESS NOTE ADULT - ASSESSMENT
62 year old female with RLE chronic venous ulcer with cellulitis  -Continue IV antibiotics  -B/L LE elevation  -Continue collagenase dressing to RLE wound  -Ace wrap compression from feet to knees bilaterally  -Will follow

## 2018-10-04 NOTE — PROGRESS NOTE ADULT - ASSESSMENT
B/l lower extremities chronic stasis dermatitis with superimposed RLE cellulitis   -Leukocytosis  -US - No DVT  -cont cefepime/vanco#2  -continue lasix 40mg IV Q12H  -id consult appreciated   -vascular consult appreciated  - will need daily local wound care with collagenase, xeroform, 4x4 kerlix and ace bandage for compression  -pain meds as needed   -elevated Legs    paroxysmal afib, inr- therapeutic   -rate controlled on CCB, BB  -cont coumadin    chronic diastolic CHF, euvolemic  -lasix  IV for LE edema  -low Na diet    NIDDM  -hold home po meds   -bgm and sliding scale    abnormal LFTs on previous admission, resolved   - US abd  - fatty liver, nonspecific lesion right lobe and left lobe not seen on prior ultrasound and  CBD dilated new from 2016   - normal TB;  trend lfts, check lipid panel- normal   - will need close GI follow up and MRI  as ouptatient     dvt px  -on coumadin

## 2018-10-04 NOTE — PROGRESS NOTE ADULT - SUBJECTIVE AND OBJECTIVE BOX
69 y/o female with h/o A.fib on coumadin, T2D, UC, PVC was admitted on 10/2 for infected right lower extremity venous ulcer.   Right leg pain mildly improved.  No fever or chills.    Vital Signs Last 24 Hrs  T(C): 36.5 (04 Oct 2018 10:47), Max: 36.7 (04 Oct 2018 04:58)  T(F): 97.7 (04 Oct 2018 10:47), Max: 98.1 (04 Oct 2018 04:58)  HR: 65 (04 Oct 2018 10:47) (65 - 100)  BP: 141/64 (04 Oct 2018 10:47) (118/74 - 142/76)  BP(mean): --  RR: 18 (04 Oct 2018 10:47) (18 - 188)  SpO2: 99% (04 Oct 2018 10:47) (96% - 99%)    Exam:  NAD  4cm x 5cm venous ulcer on right posterior thigh. No purulence  Bilateral chronic venous changes                          13.7   13.11 )-----------( 398      ( 04 Oct 2018 07:37 )             42.2

## 2018-10-04 NOTE — PROGRESS NOTE ADULT - SUBJECTIVE AND OBJECTIVE BOX
HOSPITALIST PROGRESS NOTE:  SUBJECTIVE:  PCP:  Chief Complaint: Patient is a 68y old  Female who presents with a chief complaint of LE reddness/pain (02 Oct 2018 18:47)      HPI:  69 y/o female w/ PMH Afib on coumadin, T2D, UC, PVC sent in by vascular surg in [facility] b/c worsening right LE redness/edema.  patient refuses to go to [facility] therefore came back here as she  was hospitalized in 2018 for same issue, treated with iv antibiotics. had debridement done 2 weeks ago.  associated with severe pain, yellowish discharge  denies any fever/chills, headache, chest pain, palpitations, nausea/vomiting.   In ED, s/p cefepime/vanco, doppler done-no dvt but limited study on right lower leg.    10/3:  Above reviewed; C/o of LE pain R>L  10/4:  still having pain in her legs. Less redness     Allergies:  penicillin (Hives)    REVIEW OF SYSTEMS:  See HPI. All other review of systems is negative unless indicated above.     OBJECTIVE  Physical Exam:  Vital Signs Last 24 Hrs  T(C): 36.5 (04 Oct 2018 10:47), Max: 36.7 (04 Oct 2018 04:58)  T(F): 97.7 (04 Oct 2018 10:47), Max: 98.1 (04 Oct 2018 04:58)  HR: 65 (04 Oct 2018 10:47) (65 - 100)  BP: 141/64 (04 Oct 2018 10:47) (118/74 - 142/76)  BP(mean): --  RR: 18 (04 Oct 2018 10:47) (18 - 188)  SpO2: 99% (04 Oct 2018 10:47) (96% - 99%)    Constitutional: NAD, awake and alert, well-developed  Neurological: AAO x 3, no focal deficits  HEENT: PERRLA, EOMI, MMM  Neck: Soft and supple, No LAD, No JVD  Respiratory: Breath sounds are clear bilaterally, No wheezing, rales or rhonchi  Cardiovascular: S1 and S2, regular rate and rhythm; no Murmurs, gallops or rubs  Gastrointestinal: Bowel Sounds present, soft, nontender, nondistended, no guarding, no rebound tenderness  Back: No CVA tenderness   Extremities:  +chronic venous stasis changes with reddness bilaterally.  +posterior ulcer with granulation tissue on right lower leg.  no discharge, bleeding or malodor.  +tender to touch  Vascular: 2+ peripheral pulses  Musculoskeletal: 5/5 strength b/l upper and lower extremities  Skin: No rashes  Breast: Deferred   Rectal: Deferred    MEDICATIONS  (STANDING):  aspirin  chewable 81 milliGRAM(s) Oral daily  atorvastatin 20 milliGRAM(s) Oral at bedtime  buDESOnide    EC Capsule 9 milliGRAM(s) Oral daily  cefepime  Injectable. 1000 milliGRAM(s) IV Push every 12 hours  dextrose 5%. 1000 milliLiter(s) (50 mL/Hr) IV Continuous <Continuous>  dextrose 50% Injectable 12.5 Gram(s) IV Push once  dextrose 50% Injectable 25 Gram(s) IV Push once  dextrose 50% Injectable 25 Gram(s) IV Push once  dicyclomine 10 milliGRAM(s) Oral daily  diltiazem    Tablet 120 milliGRAM(s) Oral two times a day  ferrous    sulfate 325 milliGRAM(s) Oral daily  furosemide    Tablet 40 milliGRAM(s) Oral daily  gabapentin 300 milliGRAM(s) Oral three times a day  insulin lispro (HumaLOG) corrective regimen sliding scale   SubCutaneous three times a day before meals  metoprolol succinate  milliGRAM(s) Oral daily  montelukast 10 milliGRAM(s) Oral daily  potassium chloride    Tablet ER 40 milliEquivalent(s) Oral daily  sodium chloride 0.9% lock flush 3 milliLiter(s) IV Push every 8 hours  vancomycin  IVPB 1000 milliGRAM(s) IV Intermittent every 12 hours  warfarin 5 milliGRAM(s) Oral daily    Lab Results:  CBC  CBC Full  -  ( 04 Oct 2018 07:37 )  WBC Count : 13.11 K/uL  Hemoglobin : 13.7 g/dL  Hematocrit : 42.2 %  Platelet Count - Automated : 398 K/uL  Mean Cell Volume : 95.5 fl  Mean Cell Hemoglobin : 31.0 pg  Mean Cell Hemoglobin Concentration : 32.5 gm/dL  Auto Neutrophil # : x  Auto Lymphocyte # : x  Auto Monocyte # : x  Auto Eosinophil # : x  Auto Basophil # : x  Auto Neutrophil % : x  Auto Lymphocyte % : x  Auto Monocyte % : x  Auto Eosinophil % : x  Auto Basophil % : x    .		Differential:	[] Automated		[] Manual  Chemistry                        13.7   13.11 )-----------( 398      ( 04 Oct 2018 07:37 )             42.2     10-    134<L>  |  98  |  15  ----------------------------<  201<H>  3.7   |  26  |  0.72    Ca    8.3<L>      04 Oct 2018 07:37  Phos  3.3     10-  Mg     1.5     10-    TPro  6.7  /  Alb  2.7<L>  /  TBili  0.5  /  DBili  x   /  AST  12<L>  /  ALT  26  /  AlkPhos  71  1003    LIVER FUNCTIONS - ( 03 Oct 2018 07:31 )  Alb: 2.7 g/dL / Pro: 6.7 gm/dL / ALK PHOS: 71 U/L / ALT: 26 U/L / AST: 12 U/L / GGT: x           PT/INR - ( 04 Oct 2018 07:37 )   PT: 26.1 sec;   INR: 2.37 ratio         MICROBIOLOGY/CULTURES:  Culture Results:   50,000 - 99,000 CFU/mL Pseudomonas aeruginosa (10-02 @ 14:50)  Culture Results:   No growth to date. (10-02 @ 13:40)  Culture Results:   No growth to date. (10-02 @ 13:40)    Urinalysis Basic - ( 02 Oct 2018 14:50 )    Color: Yellow / Appearance: Clear / S.025 / pH: x  Gluc: x / Ketone: Trace  / Bili: Negative / Urobili: Negative mg/dL   Blood: x / Protein: 15 mg/dL / Nitrite: Negative   Leuk Esterase: Small / RBC: 3-5 /HPF / WBC 3-5   Sq Epi: x / Non Sq Epi: Moderate / Bacteria: Occasional      RADIOLOGY/EKG:    < from: Xray Chest 1 View- PORTABLE-Urgent (10.02.18 @ 14:37) >    Impression: No active disease.    < end of copied text >    < from: US Duplex Venous Lower Ext Complete, Bilateral (10.02.18 @ 15:36) >    IMPRESSION:   Unremarkable ultrasound of the right and left lower   extremity deep venous system.    Limited evaluation of the RIGHT   posterior tibial and peroneal veins and soleal and gastrocnemius veins   due to calf bandaging      < end of copied text >

## 2018-10-04 NOTE — PROGRESS NOTE ADULT - SUBJECTIVE AND OBJECTIVE BOX
Date of service: 10-04-18 @ 12:59    Lying in bed in NAD  Has persistent right lower leg pain  Feels discuraged    ROS: no fever or chills; denies dizziness, no HA, no SOB or cough, no abdominal pain, no diarrhea or constipation; no dysuria    MEDICATIONS  (STANDING):  aspirin  chewable 81 milliGRAM(s) Oral daily  buDESOnide    EC Capsule 9 milliGRAM(s) Oral daily  cefepime   IVPB 2000 milliGRAM(s) IV Intermittent every 12 hours  collagenase Ointment 1 Application(s) Topical daily  dextrose 5%. 1000 milliLiter(s) (50 mL/Hr) IV Continuous <Continuous>  dextrose 50% Injectable 12.5 Gram(s) IV Push once  dextrose 50% Injectable 25 Gram(s) IV Push once  dextrose 50% Injectable 25 Gram(s) IV Push once  diltiazem    Tablet 120 milliGRAM(s) Oral two times a day  ferrous    sulfate 325 milliGRAM(s) Oral daily  furosemide   Injectable 40 milliGRAM(s) IV Push every 12 hours  insulin lispro (HumaLOG) corrective regimen sliding scale   SubCutaneous three times a day before meals  magnesium oxide 400 milliGRAM(s) Oral three times a day with meals  metoprolol succinate  milliGRAM(s) Oral daily  montelukast 10 milliGRAM(s) Oral daily  potassium chloride    Tablet ER 40 milliEquivalent(s) Oral daily  simvastatin 5 milliGRAM(s) Oral at bedtime  sodium chloride 0.9% lock flush 3 milliLiter(s) IV Push every 8 hours  vancomycin  IVPB 1750 milliGRAM(s) IV Intermittent every 12 hours  warfarin 5 milliGRAM(s) Oral daily      Vital Signs Last 24 Hrs  T(C): 36.5 (04 Oct 2018 10:47), Max: 36.7 (04 Oct 2018 04:58)  T(F): 97.7 (04 Oct 2018 10:47), Max: 98.1 (04 Oct 2018 04:58)  HR: 65 (04 Oct 2018 10:47) (65 - 100)  BP: 141/64 (04 Oct 2018 10:47) (118/74 - 142/76)  BP(mean): --  RR: 18 (04 Oct 2018 10:47) (18 - 188)  SpO2: 99% (04 Oct 2018 10:47) (96% - 99%)    Physical Exam:      Constitutional: frail looking  HEENT: NC/AT, EOMI, PERRLA, conjunctivae clear  Neck: supple; thyroid not palpable  Back: no tenderness  Respiratory: respiratory effort normal; clear to auscultation  Cardiovascular: S1S2 regular, no murmurs  Abdomen: soft, not tender, not distended, positive BS  Genitourinary: no suprapubic tenderness  Lymphatic: no LN palpable  Musculoskeletal: no muscle tenderness, no joint swelling or tenderness  Extremities: 2 + pedal edema  RLE calf ulcer with scant discharge; right calf brown discoloration and edema; severe tenderness with palpation  Neurological/ Psychiatric: AxOx3, judgement and insight normal; moving all extremities  Skin: no rashes; no palpable lesions    Labs: reviewed                        13.7   13.11 )-----------( 398      ( 04 Oct 2018 07:37 )             42.2     10-    134<L>  |  98  |  15  ----------------------------<  201<H>  3.7   |  26  |  0.72    Ca    8.3<L>      04 Oct 2018 07:37  Phos  3.3     10-04  Mg     1.5     10-    TPro  6.7  /  Alb  2.7<L>  /  TBili  0.5  /  DBili  x   /  AST  12<L>  /  ALT  26  /  AlkPhos  71  10                        12.6   11.20 )-----------( 359      ( 03 Oct 2018 07:31 )             39.2     10-    137  |  98  |  16  ----------------------------<  162<H>  3.4<L>   |  31  |  0.52    Ca    8.3<L>      03 Oct 2018 07:31  Phos  2.9     10-03  Mg     1.5     10-    TPro  6.7  /  Alb  2.7<L>  /  TBili  0.5  /  DBili  x   /  AST  12<L>  /  ALT  26  /  AlkPhos  71  10-03     LIVER FUNCTIONS - ( 03 Oct 2018 07:31 )  Alb: 2.7 g/dL / Pro: 6.7 gm/dL / ALK PHOS: 71 U/L / ALT: 26 U/L / AST: 12 U/L / GGT: x           Urinalysis Basic - ( 02 Oct 2018 14:50 )    Color: Yellow / Appearance: Clear / S.025 / pH: x  Gluc: x / Ketone: Trace  / Bili: Negative / Urobili: Negative mg/dL   Blood: x / Protein: 15 mg/dL / Nitrite: Negative   Leuk Esterase: Small / RBC: 3-5 /HPF / WBC 3-5   Sq Epi: x / Non Sq Epi: Moderate / Bacteria: Occasional      Culture - Urine (collected 02 Oct 2018 14:50)  Source: .Urine None  Preliminary Report (03 Oct 2018 21:00):    50,000 - 99,000 CFU/mL Pseudomonas aeruginosa    Culture - Blood (collected 02 Oct 2018 13:40)  Source: .Blood Blood  Preliminary Report (03 Oct 2018 18:02):    No growth to date.    Culture - Blood (collected 02 Oct 2018 13:40)  Source: .Blood Blood  Preliminary Report (03 Oct 2018 18:02):    No growth to date.        Radiology: all available radiological tests reviewed    Advanced directives addressed: full resuscitation

## 2018-10-05 LAB
ANION GAP SERPL CALC-SCNC: 6 MMOL/L — SIGNIFICANT CHANGE UP (ref 5–17)
BUN SERPL-MCNC: 14 MG/DL — SIGNIFICANT CHANGE UP (ref 7–23)
CALCIUM SERPL-MCNC: 8.3 MG/DL — LOW (ref 8.5–10.1)
CHLORIDE SERPL-SCNC: 99 MMOL/L — SIGNIFICANT CHANGE UP (ref 96–108)
CO2 SERPL-SCNC: 33 MMOL/L — HIGH (ref 22–31)
CREAT SERPL-MCNC: 0.54 MG/DL — SIGNIFICANT CHANGE UP (ref 0.5–1.3)
GLUCOSE BLDC GLUCOMTR-MCNC: 152 MG/DL — HIGH (ref 70–99)
GLUCOSE BLDC GLUCOMTR-MCNC: 157 MG/DL — HIGH (ref 70–99)
GLUCOSE BLDC GLUCOMTR-MCNC: 161 MG/DL — HIGH (ref 70–99)
GLUCOSE SERPL-MCNC: 123 MG/DL — HIGH (ref 70–99)
HCT VFR BLD CALC: 39.2 % — SIGNIFICANT CHANGE UP (ref 34.5–45)
HGB BLD-MCNC: 12.7 G/DL — SIGNIFICANT CHANGE UP (ref 11.5–15.5)
INR BLD: 1.62 RATIO — HIGH (ref 0.88–1.16)
MAGNESIUM SERPL-MCNC: 1.8 MG/DL — SIGNIFICANT CHANGE UP (ref 1.6–2.6)
MCHC RBC-ENTMCNC: 30.5 PG — SIGNIFICANT CHANGE UP (ref 27–34)
MCHC RBC-ENTMCNC: 32.4 GM/DL — SIGNIFICANT CHANGE UP (ref 32–36)
MCV RBC AUTO: 94.2 FL — SIGNIFICANT CHANGE UP (ref 80–100)
NRBC # BLD: 0 /100 WBCS — SIGNIFICANT CHANGE UP (ref 0–0)
PHOSPHATE SERPL-MCNC: 3.4 MG/DL — SIGNIFICANT CHANGE UP (ref 2.5–4.5)
PLATELET # BLD AUTO: 327 K/UL — SIGNIFICANT CHANGE UP (ref 150–400)
POTASSIUM SERPL-MCNC: 3.1 MMOL/L — LOW (ref 3.5–5.3)
POTASSIUM SERPL-SCNC: 3.1 MMOL/L — LOW (ref 3.5–5.3)
PROTHROM AB SERPL-ACNC: 17.7 SEC — HIGH (ref 9.8–12.7)
RBC # BLD: 4.16 M/UL — SIGNIFICANT CHANGE UP (ref 3.8–5.2)
RBC # FLD: 13.4 % — SIGNIFICANT CHANGE UP (ref 10.3–14.5)
SODIUM SERPL-SCNC: 138 MMOL/L — SIGNIFICANT CHANGE UP (ref 135–145)
VANCOMYCIN TROUGH SERPL-MCNC: 13.7 UG/ML — SIGNIFICANT CHANGE UP (ref 10–20)
WBC # BLD: 8.97 K/UL — SIGNIFICANT CHANGE UP (ref 3.8–10.5)
WBC # FLD AUTO: 8.97 K/UL — SIGNIFICANT CHANGE UP (ref 3.8–10.5)

## 2018-10-05 RX ORDER — WARFARIN SODIUM 2.5 MG/1
5 TABLET ORAL DAILY
Qty: 0 | Refills: 0 | Status: DISCONTINUED | OUTPATIENT
Start: 2018-10-05 | End: 2018-10-11

## 2018-10-05 RX ORDER — POTASSIUM CHLORIDE 20 MEQ
40 PACKET (EA) ORAL ONCE
Qty: 0 | Refills: 0 | Status: COMPLETED | OUTPATIENT
Start: 2018-10-05 | End: 2018-10-05

## 2018-10-05 RX ADMIN — WARFARIN SODIUM 5 MILLIGRAM(S): 2.5 TABLET ORAL at 21:17

## 2018-10-05 RX ADMIN — Medication 250 MILLIGRAM(S): at 07:36

## 2018-10-05 RX ADMIN — MAGNESIUM OXIDE 400 MG ORAL TABLET 400 MILLIGRAM(S): 241.3 TABLET ORAL at 08:24

## 2018-10-05 RX ADMIN — Medication 81 MILLIGRAM(S): at 12:45

## 2018-10-05 RX ADMIN — OXYCODONE AND ACETAMINOPHEN 1 TABLET(S): 5; 325 TABLET ORAL at 12:46

## 2018-10-05 RX ADMIN — Medication 40 MILLIEQUIVALENT(S): at 12:44

## 2018-10-05 RX ADMIN — OXYCODONE AND ACETAMINOPHEN 1 TABLET(S): 5; 325 TABLET ORAL at 21:57

## 2018-10-05 RX ADMIN — MONTELUKAST 10 MILLIGRAM(S): 4 TABLET, CHEWABLE ORAL at 12:45

## 2018-10-05 RX ADMIN — SIMVASTATIN 5 MILLIGRAM(S): 20 TABLET, FILM COATED ORAL at 21:17

## 2018-10-05 RX ADMIN — SODIUM CHLORIDE 3 MILLILITER(S): 9 INJECTION INTRAMUSCULAR; INTRAVENOUS; SUBCUTANEOUS at 06:56

## 2018-10-05 RX ADMIN — SODIUM CHLORIDE 3 MILLILITER(S): 9 INJECTION INTRAMUSCULAR; INTRAVENOUS; SUBCUTANEOUS at 13:42

## 2018-10-05 RX ADMIN — Medication 40 MILLIEQUIVALENT(S): at 12:45

## 2018-10-05 RX ADMIN — Medication 2: at 08:23

## 2018-10-05 RX ADMIN — Medication 2: at 17:13

## 2018-10-05 RX ADMIN — CEFEPIME 100 MILLIGRAM(S): 1 INJECTION, POWDER, FOR SOLUTION INTRAMUSCULAR; INTRAVENOUS at 17:15

## 2018-10-05 RX ADMIN — Medication 40 MILLIGRAM(S): at 18:22

## 2018-10-05 RX ADMIN — CEFEPIME 100 MILLIGRAM(S): 1 INJECTION, POWDER, FOR SOLUTION INTRAMUSCULAR; INTRAVENOUS at 10:42

## 2018-10-05 RX ADMIN — Medication 250 MILLIGRAM(S): at 18:23

## 2018-10-05 RX ADMIN — Medication 40 MILLIGRAM(S): at 10:44

## 2018-10-05 RX ADMIN — Medication 2: at 12:44

## 2018-10-05 RX ADMIN — Medication 1 APPLICATION(S): at 12:45

## 2018-10-05 RX ADMIN — OXYCODONE AND ACETAMINOPHEN 1 TABLET(S): 5; 325 TABLET ORAL at 21:17

## 2018-10-05 RX ADMIN — Medication 9 MILLIGRAM(S): at 06:59

## 2018-10-05 RX ADMIN — Medication 325 MILLIGRAM(S): at 12:45

## 2018-10-05 RX ADMIN — Medication 100 MILLIGRAM(S): at 06:59

## 2018-10-05 RX ADMIN — SODIUM CHLORIDE 3 MILLILITER(S): 9 INJECTION INTRAMUSCULAR; INTRAVENOUS; SUBCUTANEOUS at 21:57

## 2018-10-05 NOTE — PHYSICAL THERAPY INITIAL EVALUATION ADULT - SKIN INTEGRITY
crust/blister/toes with crusty/dirty appearing layer of skin near bases and interspaces (chronic) n/a

## 2018-10-05 NOTE — PROGRESS NOTE ADULT - ASSESSMENT
69 y/o female with h/o A.fib on coumadin, T2D, UC, PVC was admitted on 10/2 for left lower extremity redness. She was referred to the hospital by vascular surgeon in [place] for worsening right LE redness/edema. The patient was hospitalized in 8/2018 for similar issue, treated with IV antibiotics. She reports a debridement done 2 weeks PTA. She has severe LLE pain, yellowish discharge. Denies fever or chills at home. In ER she received vancomycin IV ans cefepime.      1. B/l lower extremities chronic stasis dermatitis with superimposed RLE cellulitis. PVD. Allergy to PCN.   -leukocytosis improving  -BC x 2  -urine culture shows a small amount of PSAE, but the patient has no dysuria and no pyuria  -on vancomycin 1.750 gm IV q12h and cefepime 2 gm IV q12h # 3  -tolerating abx well so far; no side effects noted  -vancomycin trough level is therapeutic  -monitor closely in ej of PCN allergy history  -elevate legs  -continue abx coverage  -victor manuel management  -monitor temps  -f/u CBC  -supportive care  2. Other issues:   -care per medicine

## 2018-10-05 NOTE — PROGRESS NOTE ADULT - SUBJECTIVE AND OBJECTIVE BOX
62 year old female with RLE chronic venous ulcer with cellulitis  Less pain in right leg today  Remains afebrile  Minimal eschar on right calf wound, overall clean    -Continue IV antibiotics  -B/L LE elevation  -Continue collagenase dressing to RLE wound  -Ace wrap compression from feet to knees bilaterally

## 2018-10-05 NOTE — PROGRESS NOTE ADULT - SUBJECTIVE AND OBJECTIVE BOX
Date of service: 10-05-18 @ 11:47    Right leg pain is improving  No discharge    ROS: no fever or chills; denies dizziness, no HA, no SOB or cough, no abdominal pain, no diarrhea or constipation; no dysuria    MEDICATIONS  (STANDING):  aspirin  chewable 81 milliGRAM(s) Oral daily  buDESOnide    EC Capsule 9 milliGRAM(s) Oral daily  cefepime   IVPB 2000 milliGRAM(s) IV Intermittent every 12 hours  collagenase Ointment 1 Application(s) Topical daily  dextrose 5%. 1000 milliLiter(s) (50 mL/Hr) IV Continuous <Continuous>  dextrose 50% Injectable 12.5 Gram(s) IV Push once  dextrose 50% Injectable 25 Gram(s) IV Push once  dextrose 50% Injectable 25 Gram(s) IV Push once  diltiazem    Tablet 120 milliGRAM(s) Oral two times a day  ferrous    sulfate 325 milliGRAM(s) Oral daily  furosemide   Injectable 40 milliGRAM(s) IV Push every 12 hours  insulin lispro (HumaLOG) corrective regimen sliding scale   SubCutaneous three times a day before meals  metoprolol succinate  milliGRAM(s) Oral daily  montelukast 10 milliGRAM(s) Oral daily  potassium chloride    Tablet ER 40 milliEquivalent(s) Oral daily  potassium chloride    Tablet ER 40 milliEquivalent(s) Oral once  simvastatin 5 milliGRAM(s) Oral at bedtime  sodium chloride 0.9% lock flush 3 milliLiter(s) IV Push every 8 hours  vancomycin  IVPB 1750 milliGRAM(s) IV Intermittent every 12 hours      Vital Signs Last 24 Hrs  T(C): 36.6 (05 Oct 2018 11:27), Max: 37.1 (04 Oct 2018 20:54)  T(F): 97.8 (05 Oct 2018 11:27), Max: 98.8 (04 Oct 2018 20:54)  HR: 71 (05 Oct 2018 11:27) (65 - 86)  BP: 149/89 (05 Oct 2018 11:27) (119/75 - 149/89)  BP(mean): --  RR: 16 (05 Oct 2018 11:27) (16 - 18)  SpO2: 99% (05 Oct 2018 11:27) (96% - 99%)    Physical Exam:      Constitutional: frail looking  HEENT: NC/AT, EOMI, PERRLA, conjunctivae clear  Neck: supple; thyroid not palpable  Back: no tenderness  Respiratory: respiratory effort normal; clear to auscultation  Cardiovascular: S1S2 regular, no murmurs  Abdomen: soft, not tender, not distended, positive BS  Genitourinary: no suprapubic tenderness  Lymphatic: no LN palpable  Musculoskeletal: no muscle tenderness, no joint swelling or tenderness  Extremities: 2 + pedal edema  RLE calf ulcer with scant discharge; right calf brown discoloration and edema; tenderness with palpation  Neurological/ Psychiatric: AxOx3, judgement and insight normal; moving all extremities  Skin: no rashes; no palpable lesions    Labs: reviewed                        12.7   8.97  )-----------( 327      ( 05 Oct 2018 05:33 )             39.2     10-05    138  |  99  |  14  ----------------------------<  123<H>  3.1<L>   |  33<H>  |  0.54    Ca    8.3<L>      05 Oct 2018 05:33  Phos  3.4     10-  Mg     1.8     10-05    Vancomycin Level, Trough: 13.7 ug/mL (10-05 @ 05:33)                        13.7   13.11 )-----------( 398      ( 04 Oct 2018 07:37 )             42.2     10    134<L>  |  98  |  15  ----------------------------<  201<H>  3.7   |  26  |  0.72    Ca    8.3<L>      04 Oct 2018 07:37  Phos  3.3     10-  Mg     1.5     10-04    TPro  6.7  /  Alb  2.7<L>  /  TBili  0.5  /  DBili  x   /  AST  12<L>  /  ALT  26  /  AlkPhos  71  10-03                        12.6   11.20 )-----------( 359      ( 03 Oct 2018 07:31 )             39.2     10    137  |  98  |  16  ----------------------------<  162<H>  3.4<L>   |  31  |  0.52    Ca    8.3<L>      03 Oct 2018 07:31  Phos  2.9     10-03  Mg     1.5     10-03    TPro  6.7  /  Alb  2.7<L>  /  TBili  0.5  /  DBili  x   /  AST  12<L>  /  ALT  26  /  AlkPhos  71  10-03     LIVER FUNCTIONS - ( 03 Oct 2018 07:31 )  Alb: 2.7 g/dL / Pro: 6.7 gm/dL / ALK PHOS: 71 U/L / ALT: 26 U/L / AST: 12 U/L / GGT: x           Urinalysis Basic - ( 02 Oct 2018 14:50 )    Color: Yellow / Appearance: Clear / S.025 / pH: x  Gluc: x / Ketone: Trace  / Bili: Negative / Urobili: Negative mg/dL   Blood: x / Protein: 15 mg/dL / Nitrite: Negative   Leuk Esterase: Small / RBC: 3-5 /HPF / WBC 3-5   Sq Epi: x / Non Sq Epi: Moderate / Bacteria: Occasional      Culture - Urine (collected 02 Oct 2018 14:50)  Source: .Urine None  Preliminary Report (03 Oct 2018 21:00):    50,000 - 99,000 CFU/mL Pseudomonas aeruginosa    Culture - Blood (collected 02 Oct 2018 13:40)  Source: .Blood Blood  Preliminary Report (03 Oct 2018 18:02):    No growth to date.    Culture - Blood (collected 02 Oct 2018 13:40)  Source: .Blood Blood  Preliminary Report (03 Oct 2018 18:02):    No growth to date.        Radiology: all available radiological tests reviewed    Advanced directives addressed: full resuscitation

## 2018-10-05 NOTE — PROGRESS NOTE ADULT - ASSESSMENT
B/l lower extremities chronic stasis dermatitis with superimposed RLE chronic venous ulcer with cellulitis   -Leukocytosis  -US - No DVT  -cont cefepime/vanco#3   -continue lasix 40mg IV Q12H  -id consult appreciated   -vascular consult appreciated  - will need daily local wound care with collagenase, xeroform, 4x4 kerlix and ace bandage for compression  -pain meds as needed   -elevated Legs    paroxysmal afib, inr- subtherapeutic   -rate controlled on CCB, BB  -cont coumadin    chronic diastolic CHF, euvolemic  -lasix  IV for LE edema  -low Na diet    NIDDM  -hold home po meds   -bgm and sliding scale    abnormal LFTs on previous admission, resolved   - US abd  - fatty liver, nonspecific lesion right lobe and left lobe not seen on prior ultrasound and  CBD dilated new from 2016   - normal TB;  trend lfts, check lipid panel- normal   - will need close GI follow up and MRI  as ouptatient     dvt px  -on coumadin

## 2018-10-05 NOTE — PROGRESS NOTE ADULT - SUBJECTIVE AND OBJECTIVE BOX
HOSPITALIST PROGRESS NOTE:  SUBJECTIVE:  PCP:  Chief Complaint: Patient is a 68y old  Female who presents with a chief complaint of LE reddness/pain (02 Oct 2018 18:47)      HPI:  67 y/o female w/ PMH Afib on coumadin, T2D, UC, PVC sent in by vascular surg in [facility] b/c worsening right LE redness/edema.  patient refuses to go to [facility] therefore came back here as she  was hospitalized in 2018 for same issue, treated with iv antibiotics. had debridement done 2 weeks ago.  associated with severe pain, yellowish discharge  denies any fever/chills, headache, chest pain, palpitations, nausea/vomiting.   In ED, s/p cefepime/vanco, doppler done-no dvt but limited study on right lower leg.    10/3:  Above reviewed; C/o of LE pain R>L  10/4:  still having pain in her legs. Less redness   10/5:  Improving. still red and swollen    Allergies:  penicillin (Hives)    REVIEW OF SYSTEMS:  See HPI. All other review of systems is negative unless indicated above.     OBJECTIVE  Physical Exam:  Vital Signs Last 24 Hrs  T(C): 36.6 (05 Oct 2018 11:27), Max: 37.1 (04 Oct 2018 20:54)  T(F): 97.8 (05 Oct 2018 11:27), Max: 98.8 (04 Oct 2018 20:54)  HR: 71 (05 Oct 2018 11:27) (65 - 86)  BP: 149/89 (05 Oct 2018 11:27) (119/75 - 149/89)  BP(mean): --  RR: 16 (05 Oct 2018 11:27) (16 - 18)  SpO2: 99% (05 Oct 2018 11:27) (96% - 99%)    Constitutional: NAD, awake and alert, well-developed  Neurological: AAO x 3, no focal deficits  HEENT: PERRLA, EOMI, MMM  Neck: Soft and supple, No LAD, No JVD  Respiratory: Breath sounds are clear bilaterally, No wheezing, rales or rhonchi  Cardiovascular: S1 and S2, regular rate and rhythm; no Murmurs, gallops or rubs  Gastrointestinal: Bowel Sounds present, soft, nontender, nondistended, no guarding, no rebound tenderness  Back: No CVA tenderness   Extremities:  +chronic venous stasis changes with reddness bilaterally.  +posterior ulcer with granulation tissue on right lower leg.  no discharge, bleeding or malodor.  +tender to touch  Vascular: 2+ peripheral pulses  Musculoskeletal: 5/5 strength b/l upper and lower extremities  Skin: No rashes  Breast: Deferred   Rectal: Deferred    MEDICATIONS  (STANDING):  aspirin  chewable 81 milliGRAM(s) Oral daily  atorvastatin 20 milliGRAM(s) Oral at bedtime  buDESOnide    EC Capsule 9 milliGRAM(s) Oral daily  cefepime  Injectable. 1000 milliGRAM(s) IV Push every 12 hours  dextrose 5%. 1000 milliLiter(s) (50 mL/Hr) IV Continuous <Continuous>  dextrose 50% Injectable 12.5 Gram(s) IV Push once  dextrose 50% Injectable 25 Gram(s) IV Push once  dextrose 50% Injectable 25 Gram(s) IV Push once  dicyclomine 10 milliGRAM(s) Oral daily  diltiazem    Tablet 120 milliGRAM(s) Oral two times a day  ferrous    sulfate 325 milliGRAM(s) Oral daily  furosemide    Tablet 40 milliGRAM(s) Oral daily  gabapentin 300 milliGRAM(s) Oral three times a day  insulin lispro (HumaLOG) corrective regimen sliding scale   SubCutaneous three times a day before meals  metoprolol succinate  milliGRAM(s) Oral daily  montelukast 10 milliGRAM(s) Oral daily  potassium chloride    Tablet ER 40 milliEquivalent(s) Oral daily  sodium chloride 0.9% lock flush 3 milliLiter(s) IV Push every 8 hours  vancomycin  IVPB 1000 milliGRAM(s) IV Intermittent every 12 hours  warfarin 5 milliGRAM(s) Oral daily    Lab Results:  CBC  CBC Full  -  ( 05 Oct 2018 05:33 )  WBC Count : 8.97 K/uL  Hemoglobin : 12.7 g/dL  Hematocrit : 39.2 %  Platelet Count - Automated : 327 K/uL  Mean Cell Volume : 94.2 fl  Mean Cell Hemoglobin : 30.5 pg  Mean Cell Hemoglobin Concentration : 32.4 gm/dL  Auto Neutrophil # : x  Auto Lymphocyte # : x  Auto Monocyte # : x  Auto Eosinophil # : x  Auto Basophil # : x  Auto Neutrophil % : x  Auto Lymphocyte % : x  Auto Monocyte % : x  Auto Eosinophil % : x  Auto Basophil % : x    .		Differential:	[] Automated		[] Manual  Chemistry                        12.7   8.97  )-----------( 327      ( 05 Oct 2018 05:33 )             39.2     10-05    138  |  99  |  14  ----------------------------<  123<H>  3.1<L>   |  33<H>  |  0.54    Ca    8.3<L>      05 Oct 2018 05:33  Phos  3.4     10-05  Mg     1.8     10-05    PT/INR - ( 05 Oct 2018 05:33 )   PT: 17.7 sec;   INR: 1.62 ratio       MICROBIOLOGY/CULTURES:  Culture Results:   50,000 - 99,000 CFU/mL Pseudomonas aeruginosa  10,000 - 49,000 CFU/mL Enterobacter cloacae/asburiae (10-02 @ 14:50)  Culture Results:   No growth to date. (10-02 @ 13:40)  Culture Results:   No growth to date. (10-02 @ 13:40)    Urinalysis Basic - ( 02 Oct 2018 14:50 )    Color: Yellow / Appearance: Clear / S.025 / pH: x  Gluc: x / Ketone: Trace  / Bili: Negative / Urobili: Negative mg/dL   Blood: x / Protein: 15 mg/dL / Nitrite: Negative   Leuk Esterase: Small / RBC: 3-5 /HPF / WBC 3-5   Sq Epi: x / Non Sq Epi: Moderate / Bacteria: Occasional      RADIOLOGY/EKG:    < from: Xray Chest 1 View- PORTABLE-Urgent (10.02.18 @ 14:37) >    Impression: No active disease.    < end of copied text >    < from: US Duplex Venous Lower Ext Complete, Bilateral (10.02.18 @ 15:36) >    IMPRESSION:   Unremarkable ultrasound of the right and left lower   extremity deep venous system.    Limited evaluation of the RIGHT   posterior tibial and peroneal veins and soleal and gastrocnemius veins   due to calf bandaging      < end of copied text >

## 2018-10-06 LAB
-  AMIKACIN: SIGNIFICANT CHANGE UP
-  AMOXICILLIN/CLAVULANIC ACID: SIGNIFICANT CHANGE UP
-  AMPICILLIN/SULBACTAM: SIGNIFICANT CHANGE UP
-  AMPICILLIN: SIGNIFICANT CHANGE UP
-  AZTREONAM: SIGNIFICANT CHANGE UP
-  CEFAZOLIN: SIGNIFICANT CHANGE UP
-  CEFEPIME: SIGNIFICANT CHANGE UP
-  CEFOXITIN: SIGNIFICANT CHANGE UP
-  CEFTRIAXONE: SIGNIFICANT CHANGE UP
-  CIPROFLOXACIN: SIGNIFICANT CHANGE UP
-  ERTAPENEM: SIGNIFICANT CHANGE UP
-  GENTAMICIN: SIGNIFICANT CHANGE UP
-  IMIPENEM: SIGNIFICANT CHANGE UP
-  LEVOFLOXACIN: SIGNIFICANT CHANGE UP
-  MEROPENEM: SIGNIFICANT CHANGE UP
-  NITROFURANTOIN: SIGNIFICANT CHANGE UP
-  PIPERACILLIN/TAZOBACTAM: SIGNIFICANT CHANGE UP
-  TIGECYCLINE: SIGNIFICANT CHANGE UP
-  TOBRAMYCIN: SIGNIFICANT CHANGE UP
-  TRIMETHOPRIM/SULFAMETHOXAZOLE: SIGNIFICANT CHANGE UP
ANION GAP SERPL CALC-SCNC: 8 MMOL/L — SIGNIFICANT CHANGE UP (ref 5–17)
BUN SERPL-MCNC: 17 MG/DL — SIGNIFICANT CHANGE UP (ref 7–23)
CALCIUM SERPL-MCNC: 8.5 MG/DL — SIGNIFICANT CHANGE UP (ref 8.5–10.1)
CHLORIDE SERPL-SCNC: 102 MMOL/L — SIGNIFICANT CHANGE UP (ref 96–108)
CO2 SERPL-SCNC: 31 MMOL/L — SIGNIFICANT CHANGE UP (ref 22–31)
CREAT SERPL-MCNC: 0.49 MG/DL — LOW (ref 0.5–1.3)
CULTURE RESULTS: SIGNIFICANT CHANGE UP
GLUCOSE BLDC GLUCOMTR-MCNC: 138 MG/DL — HIGH (ref 70–99)
GLUCOSE SERPL-MCNC: 115 MG/DL — HIGH (ref 70–99)
INR BLD: 1.56 RATIO — HIGH (ref 0.88–1.16)
MAGNESIUM SERPL-MCNC: 1.9 MG/DL — SIGNIFICANT CHANGE UP (ref 1.6–2.6)
METHOD TYPE: SIGNIFICANT CHANGE UP
ORGANISM # SPEC MICROSCOPIC CNT: SIGNIFICANT CHANGE UP
PHOSPHATE SERPL-MCNC: 3.1 MG/DL — SIGNIFICANT CHANGE UP (ref 2.5–4.5)
POTASSIUM SERPL-MCNC: 3.3 MMOL/L — LOW (ref 3.5–5.3)
POTASSIUM SERPL-SCNC: 3.3 MMOL/L — LOW (ref 3.5–5.3)
PROTHROM AB SERPL-ACNC: 17 SEC — HIGH (ref 9.8–12.7)
SODIUM SERPL-SCNC: 141 MMOL/L — SIGNIFICANT CHANGE UP (ref 135–145)
SPECIMEN SOURCE: SIGNIFICANT CHANGE UP

## 2018-10-06 RX ORDER — MORPHINE SULFATE 50 MG/1
2 CAPSULE, EXTENDED RELEASE ORAL ONCE
Qty: 0 | Refills: 0 | Status: DISCONTINUED | OUTPATIENT
Start: 2018-10-06 | End: 2018-10-06

## 2018-10-06 RX ORDER — POTASSIUM CHLORIDE 20 MEQ
40 PACKET (EA) ORAL ONCE
Qty: 0 | Refills: 0 | Status: COMPLETED | OUTPATIENT
Start: 2018-10-06 | End: 2018-10-06

## 2018-10-06 RX ADMIN — SIMVASTATIN 5 MILLIGRAM(S): 20 TABLET, FILM COATED ORAL at 23:03

## 2018-10-06 RX ADMIN — SODIUM CHLORIDE 3 MILLILITER(S): 9 INJECTION INTRAMUSCULAR; INTRAVENOUS; SUBCUTANEOUS at 23:04

## 2018-10-06 RX ADMIN — OXYCODONE HYDROCHLORIDE 10 MILLIGRAM(S): 5 TABLET ORAL at 07:37

## 2018-10-06 RX ADMIN — CEFEPIME 100 MILLIGRAM(S): 1 INJECTION, POWDER, FOR SOLUTION INTRAMUSCULAR; INTRAVENOUS at 17:59

## 2018-10-06 RX ADMIN — WARFARIN SODIUM 5 MILLIGRAM(S): 2.5 TABLET ORAL at 23:04

## 2018-10-06 RX ADMIN — Medication 9 MILLIGRAM(S): at 06:52

## 2018-10-06 RX ADMIN — SODIUM CHLORIDE 3 MILLILITER(S): 9 INJECTION INTRAMUSCULAR; INTRAVENOUS; SUBCUTANEOUS at 07:35

## 2018-10-06 RX ADMIN — CEFEPIME 100 MILLIGRAM(S): 1 INJECTION, POWDER, FOR SOLUTION INTRAMUSCULAR; INTRAVENOUS at 06:50

## 2018-10-06 RX ADMIN — MORPHINE SULFATE 2 MILLIGRAM(S): 50 CAPSULE, EXTENDED RELEASE ORAL at 01:30

## 2018-10-06 RX ADMIN — Medication 1 APPLICATION(S): at 17:21

## 2018-10-06 RX ADMIN — Medication 100 MILLIGRAM(S): at 06:55

## 2018-10-06 RX ADMIN — OXYCODONE HYDROCHLORIDE 10 MILLIGRAM(S): 5 TABLET ORAL at 15:48

## 2018-10-06 RX ADMIN — Medication 81 MILLIGRAM(S): at 11:53

## 2018-10-06 RX ADMIN — Medication 325 MILLIGRAM(S): at 11:53

## 2018-10-06 RX ADMIN — Medication 250 MILLIGRAM(S): at 18:47

## 2018-10-06 RX ADMIN — OXYCODONE HYDROCHLORIDE 10 MILLIGRAM(S): 5 TABLET ORAL at 06:50

## 2018-10-06 RX ADMIN — Medication 2: at 17:29

## 2018-10-06 RX ADMIN — MONTELUKAST 10 MILLIGRAM(S): 4 TABLET, CHEWABLE ORAL at 23:03

## 2018-10-06 RX ADMIN — Medication 40 MILLIEQUIVALENT(S): at 11:53

## 2018-10-06 RX ADMIN — Medication 40 MILLIEQUIVALENT(S): at 13:35

## 2018-10-06 RX ADMIN — Medication 40 MILLIGRAM(S): at 17:59

## 2018-10-06 RX ADMIN — Medication 250 MILLIGRAM(S): at 08:02

## 2018-10-06 RX ADMIN — SODIUM CHLORIDE 3 MILLILITER(S): 9 INJECTION INTRAMUSCULAR; INTRAVENOUS; SUBCUTANEOUS at 18:47

## 2018-10-06 RX ADMIN — OXYCODONE HYDROCHLORIDE 10 MILLIGRAM(S): 5 TABLET ORAL at 23:03

## 2018-10-06 RX ADMIN — MORPHINE SULFATE 2 MILLIGRAM(S): 50 CAPSULE, EXTENDED RELEASE ORAL at 01:07

## 2018-10-06 NOTE — PROGRESS NOTE ADULT - SUBJECTIVE AND OBJECTIVE BOX
HOSPITALIST PROGRESS NOTE:  SUBJECTIVE:  PCP:  Chief Complaint: Patient is a 68y old  Female who presents with a chief complaint of LE reddness/pain (02 Oct 2018 18:47)      HPI:  67 y/o female w/ PMH Afib on coumadin, T2D, UC, PVC sent in by vascular surg in [facility] b/c worsening right LE redness/edema.  patient refuses to go to [facility] therefore came back here as she  was hospitalized in 8/2018 for same issue, treated with iv antibiotics. had debridement done 2 weeks ago.  associated with severe pain, yellowish discharge  denies any fever/chills, headache, chest pain, palpitations, nausea/vomiting.   In ED, s/p cefepime/vanco, doppler done-no dvt but limited study on right lower leg.    10/3:  Above reviewed; C/o of LE pain R>L  10/4:  still having pain in her legs. Less redness   10/5:  Improving. still red and swollen  10/6: This morning patient didnt take the lasix; She felt anxious and it felt like she was having a panic attack; Upset about her legs and the fact that she is in the hospital. she is missing her grandsons game.    Allergies:  penicillin (Hives)    REVIEW OF SYSTEMS:  See HPI. All other review of systems is negative unless indicated above.     OBJECTIVE  Physical Exam:  Vital Signs Last 24 Hrs  T(C): 36.6 (06 Oct 2018 11:19), Max: 36.9 (05 Oct 2018 20:30)  T(F): 97.9 (06 Oct 2018 11:19), Max: 98.5 (05 Oct 2018 20:30)  HR: 61 (06 Oct 2018 11:19) (61 - 100)  BP: 120/51 (06 Oct 2018 11:19) (120/51 - 153/69)  BP(mean): --  RR: 17 (06 Oct 2018 11:19) (16 - 17)  SpO2: 100% (06 Oct 2018 11:19) (96% - 100%)    Constitutional: NAD, awake and alert, well-developed  Neurological: AAO x 3, no focal deficits  HEENT: PERRLA, EOMI, MMM  Neck: Soft and supple, No LAD, No JVD  Respiratory: Breath sounds are clear bilaterally, No wheezing, rales or rhonchi  Cardiovascular: S1 and S2, regular rate and rhythm; no Murmurs, gallops or rubs  Gastrointestinal: Bowel Sounds present, soft, nontender, nondistended, no guarding, no rebound tenderness  Back: No CVA tenderness   Extremities:  +chronic venous stasis changes with reddness bilaterally.  +posterior ulcer with granulation tissue on right lower leg.  no discharge, bleeding or malodor.  +tender to touch  Vascular: 2+ peripheral pulses  Musculoskeletal: 5/5 strength b/l upper and lower extremities  Skin: No rashes  Breast: Deferred   Rectal: Deferred    MEDICATIONS  (STANDING):  aspirin  chewable 81 milliGRAM(s) Oral daily  atorvastatin 20 milliGRAM(s) Oral at bedtime  buDESOnide    EC Capsule 9 milliGRAM(s) Oral daily  cefepime  Injectable. 1000 milliGRAM(s) IV Push every 12 hours  dextrose 5%. 1000 milliLiter(s) (50 mL/Hr) IV Continuous <Continuous>  dextrose 50% Injectable 12.5 Gram(s) IV Push once  dextrose 50% Injectable 25 Gram(s) IV Push once  dextrose 50% Injectable 25 Gram(s) IV Push once  dicyclomine 10 milliGRAM(s) Oral daily  diltiazem    Tablet 120 milliGRAM(s) Oral two times a day  ferrous    sulfate 325 milliGRAM(s) Oral daily  furosemide    Tablet 40 milliGRAM(s) Oral daily  gabapentin 300 milliGRAM(s) Oral three times a day  insulin lispro (HumaLOG) corrective regimen sliding scale   SubCutaneous three times a day before meals  metoprolol succinate  milliGRAM(s) Oral daily  montelukast 10 milliGRAM(s) Oral daily  potassium chloride    Tablet ER 40 milliEquivalent(s) Oral daily  sodium chloride 0.9% lock flush 3 milliLiter(s) IV Push every 8 hours  vancomycin  IVPB 1000 milliGRAM(s) IV Intermittent every 12 hours  warfarin 5 milliGRAM(s) Oral daily  Lab Results:  CBC  CBC Full  -  ( 05 Oct 2018 05:33 )  WBC Count : 8.97 K/uL  Hemoglobin : 12.7 g/dL  Hematocrit : 39.2 %  Platelet Count - Automated : 327 K/uL  Mean Cell Volume : 94.2 fl  Mean Cell Hemoglobin : 30.5 pg  Mean Cell Hemoglobin Concentration : 32.4 gm/dL  Auto Neutrophil # : x  Auto Lymphocyte # : x  Auto Monocyte # : x  Auto Eosinophil # : x  Auto Basophil # : x  Auto Neutrophil % : x  Auto Lymphocyte % : x  Auto Monocyte % : x  Auto Eosinophil % : x  Auto Basophil % : x    .		Differential:	[] Automated		[] Manual  Chemistry                        12.7   8.97  )-----------( 327      ( 05 Oct 2018 05:33 )             39.2     10-06    141  |  102  |  17  ----------------------------<  115<H>  3.3<L>   |  31  |  0.49<L>    Ca    8.5      06 Oct 2018 07:40  Phos  3.1     10-06  Mg     1.9     10-06        PT/INR - ( 06 Oct 2018 07:40 )   PT: 17.0 sec;   INR: 1.56 ratio         MICROBIOLOGY/CULTURES:  Culture Results:   50,000 - 99,000 CFU/mL Pseudomonas aeruginosa  10,000 - 49,000 CFU/mL Enterobacter cloacae/asburiae (10-02 @ 14:50)  Culture Results:   No growth to date. (10-02 @ 13:40)  Culture Results:   No growth to date. (10-02 @ 13:40)        RADIOLOGY/EKG:    < from: Xray Chest 1 View- PORTABLE-Urgent (10.02.18 @ 14:37) >    Impression: No active disease.    < end of copied text >    < from: US Duplex Venous Lower Ext Complete, Bilateral (10.02.18 @ 15:36) >    IMPRESSION:   Unremarkable ultrasound of the right and left lower   extremity deep venous system.    Limited evaluation of the RIGHT   posterior tibial and peroneal veins and soleal and gastrocnemius veins   due to calf bandaging      < end of copied text >

## 2018-10-06 NOTE — PROGRESS NOTE ADULT - ASSESSMENT
B/l lower extremities chronic stasis dermatitis with superimposed RLE chronic venous ulcer with cellulitis   -Leukocytosis  -US - No DVT  -cont cefepime/vanco#4  -continue lasix 40mg IV Q12H  -id consult appreciated   -vascular consult appreciated  - will need daily local wound care with collagenase, xeroform, 4x4 kerlix and ace bandage for compression  -pain meds as needed   -elevated Legs    Hypokalemia  -replete and monitor     paroxysmal afib, inr- subtherapeutic   -rate controlled on CCB, BB  -cont coumadin    chronic diastolic CHF, euvolemic  -lasix  IV for LE edema  -low Na diet    NIDDM  -hold home po meds   -bgm and sliding scale    abnormal LFTs on previous admission, resolved   - US abd  - fatty liver, nonspecific lesion right lobe and left lobe not seen on prior ultrasound and  CBD dilated new from 2016   - normal TB;  trend lfts, check lipid panel- normal   - will need close GI follow up and MRI  as ouptatient     dvt px  -on coumadin

## 2018-10-07 LAB
ANION GAP SERPL CALC-SCNC: 6 MMOL/L — SIGNIFICANT CHANGE UP (ref 5–17)
BUN SERPL-MCNC: 15 MG/DL — SIGNIFICANT CHANGE UP (ref 7–23)
CALCIUM SERPL-MCNC: 8.4 MG/DL — LOW (ref 8.5–10.1)
CHLORIDE SERPL-SCNC: 104 MMOL/L — SIGNIFICANT CHANGE UP (ref 96–108)
CO2 SERPL-SCNC: 28 MMOL/L — SIGNIFICANT CHANGE UP (ref 22–31)
CREAT SERPL-MCNC: 0.52 MG/DL — SIGNIFICANT CHANGE UP (ref 0.5–1.3)
CULTURE RESULTS: SIGNIFICANT CHANGE UP
CULTURE RESULTS: SIGNIFICANT CHANGE UP
GLUCOSE BLDC GLUCOMTR-MCNC: 134 MG/DL — HIGH (ref 70–99)
GLUCOSE BLDC GLUCOMTR-MCNC: 144 MG/DL — HIGH (ref 70–99)
GLUCOSE BLDC GLUCOMTR-MCNC: 163 MG/DL — HIGH (ref 70–99)
GLUCOSE BLDC GLUCOMTR-MCNC: 187 MG/DL — HIGH (ref 70–99)
GLUCOSE SERPL-MCNC: 142 MG/DL — HIGH (ref 70–99)
HCT VFR BLD CALC: 37.6 % — SIGNIFICANT CHANGE UP (ref 34.5–45)
HGB BLD-MCNC: 11.9 G/DL — SIGNIFICANT CHANGE UP (ref 11.5–15.5)
INR BLD: 1.71 RATIO — HIGH (ref 0.88–1.16)
MAGNESIUM SERPL-MCNC: 1.8 MG/DL — SIGNIFICANT CHANGE UP (ref 1.6–2.6)
MCHC RBC-ENTMCNC: 30.9 PG — SIGNIFICANT CHANGE UP (ref 27–34)
MCHC RBC-ENTMCNC: 31.6 GM/DL — LOW (ref 32–36)
MCV RBC AUTO: 97.7 FL — SIGNIFICANT CHANGE UP (ref 80–100)
NRBC # BLD: 0 /100 WBCS — SIGNIFICANT CHANGE UP (ref 0–0)
PHOSPHATE SERPL-MCNC: 3 MG/DL — SIGNIFICANT CHANGE UP (ref 2.5–4.5)
PLATELET # BLD AUTO: 331 K/UL — SIGNIFICANT CHANGE UP (ref 150–400)
POTASSIUM SERPL-MCNC: 4 MMOL/L — SIGNIFICANT CHANGE UP (ref 3.5–5.3)
POTASSIUM SERPL-SCNC: 4 MMOL/L — SIGNIFICANT CHANGE UP (ref 3.5–5.3)
PROTHROM AB SERPL-ACNC: 18.7 SEC — HIGH (ref 9.8–12.7)
RBC # BLD: 3.85 M/UL — SIGNIFICANT CHANGE UP (ref 3.8–5.2)
RBC # FLD: 13.7 % — SIGNIFICANT CHANGE UP (ref 10.3–14.5)
SODIUM SERPL-SCNC: 138 MMOL/L — SIGNIFICANT CHANGE UP (ref 135–145)
SPECIMEN SOURCE: SIGNIFICANT CHANGE UP
SPECIMEN SOURCE: SIGNIFICANT CHANGE UP
WBC # BLD: 9.81 K/UL — SIGNIFICANT CHANGE UP (ref 3.8–10.5)
WBC # FLD AUTO: 9.81 K/UL — SIGNIFICANT CHANGE UP (ref 3.8–10.5)

## 2018-10-07 RX ADMIN — WARFARIN SODIUM 5 MILLIGRAM(S): 2.5 TABLET ORAL at 21:38

## 2018-10-07 RX ADMIN — Medication 9 MILLIGRAM(S): at 06:23

## 2018-10-07 RX ADMIN — Medication 100 MILLIGRAM(S): at 06:23

## 2018-10-07 RX ADMIN — Medication 325 MILLIGRAM(S): at 11:47

## 2018-10-07 RX ADMIN — OXYCODONE HYDROCHLORIDE 10 MILLIGRAM(S): 5 TABLET ORAL at 21:41

## 2018-10-07 RX ADMIN — SODIUM CHLORIDE 3 MILLILITER(S): 9 INJECTION INTRAMUSCULAR; INTRAVENOUS; SUBCUTANEOUS at 06:24

## 2018-10-07 RX ADMIN — OXYCODONE HYDROCHLORIDE 10 MILLIGRAM(S): 5 TABLET ORAL at 21:11

## 2018-10-07 RX ADMIN — Medication 250 MILLIGRAM(S): at 06:24

## 2018-10-07 RX ADMIN — CEFEPIME 100 MILLIGRAM(S): 1 INJECTION, POWDER, FOR SOLUTION INTRAMUSCULAR; INTRAVENOUS at 17:03

## 2018-10-07 RX ADMIN — Medication 1 APPLICATION(S): at 17:01

## 2018-10-07 RX ADMIN — Medication 40 MILLIGRAM(S): at 10:09

## 2018-10-07 RX ADMIN — SODIUM CHLORIDE 3 MILLILITER(S): 9 INJECTION INTRAMUSCULAR; INTRAVENOUS; SUBCUTANEOUS at 15:00

## 2018-10-07 RX ADMIN — MONTELUKAST 10 MILLIGRAM(S): 4 TABLET, CHEWABLE ORAL at 21:38

## 2018-10-07 RX ADMIN — Medication 250 MILLIGRAM(S): at 19:01

## 2018-10-07 RX ADMIN — SODIUM CHLORIDE 3 MILLILITER(S): 9 INJECTION INTRAMUSCULAR; INTRAVENOUS; SUBCUTANEOUS at 21:28

## 2018-10-07 RX ADMIN — SIMVASTATIN 5 MILLIGRAM(S): 20 TABLET, FILM COATED ORAL at 21:38

## 2018-10-07 RX ADMIN — CEFEPIME 100 MILLIGRAM(S): 1 INJECTION, POWDER, FOR SOLUTION INTRAMUSCULAR; INTRAVENOUS at 06:23

## 2018-10-07 RX ADMIN — Medication 81 MILLIGRAM(S): at 11:47

## 2018-10-07 RX ADMIN — Medication 40 MILLIEQUIVALENT(S): at 11:49

## 2018-10-07 RX ADMIN — OXYCODONE HYDROCHLORIDE 10 MILLIGRAM(S): 5 TABLET ORAL at 14:59

## 2018-10-07 RX ADMIN — OXYCODONE HYDROCHLORIDE 10 MILLIGRAM(S): 5 TABLET ORAL at 06:23

## 2018-10-07 NOTE — PROGRESS NOTE ADULT - SUBJECTIVE AND OBJECTIVE BOX
HOSPITALIST PROGRESS NOTE:  SUBJECTIVE:  PCP:  Chief Complaint: Patient is a 68y old  Female who presents with a chief complaint of LE reddness/pain (02 Oct 2018 18:47)      HPI:  69 y/o female w/ PMH Afib on coumadin, T2D, UC, PVC sent in by vascular surg in [facility] b/c worsening right LE redness/edema.  patient refuses to go to [facility] therefore came back here as she  was hospitalized in 8/2018 for same issue, treated with iv antibiotics. had debridement done 2 weeks ago.  associated with severe pain, yellowish discharge  denies any fever/chills, headache, chest pain, palpitations, nausea/vomiting.   In ED, s/p cefepime/vanco, doppler done-no dvt but limited study on right lower leg.    10/3:  Above reviewed; C/o of LE pain R>L  10/4:  still having pain in her legs. Less redness   10/5:  Improving. still red and swollen  10/6: This morning patient didnt take the lasix; She felt anxious and it felt like she was having a panic attack; Upset about her legs and the fact that she is in the hospital. she is missing her grandsons game.  10/7: states RLE improving but LLE still swollen    Allergies:  penicillin (Hives)    REVIEW OF SYSTEMS:  See HPI. All other review of systems is negative unless indicated above.     OBJECTIVE  Physical Exam:  Vital Signs Last 24 Hrs  T(C): 36.9 (07 Oct 2018 10:58), Max: 36.9 (07 Oct 2018 10:58)  T(F): 98.4 (07 Oct 2018 10:58), Max: 98.4 (07 Oct 2018 10:58)  HR: 78 (07 Oct 2018 10:58) (73 - 100)  BP: 135/68 (07 Oct 2018 10:58) (116/56 - 145/98)  BP(mean): --  RR: 17 (07 Oct 2018 10:58) (16 - 18)  SpO2: 95% (07 Oct 2018 10:58) (95% - 100%)    Constitutional: NAD, awake and alert, well-developed  Neurological: AAO x 3, no focal deficits  HEENT: PERRLA, EOMI, MMM  Neck: Soft and supple, No LAD, No JVD  Respiratory: Breath sounds are clear bilaterally, No wheezing, rales or rhonchi  Cardiovascular: S1 and S2, regular rate and rhythm; no Murmurs, gallops or rubs  Gastrointestinal: Bowel Sounds present, soft, nontender, nondistended, no guarding, no rebound tenderness  Back: No CVA tenderness   Extremities:  +chronic venous stasis changes with reddness bilaterally.  +posterior ulcer with granulation tissue on right lower leg.  no discharge, bleeding or malodor.  +tender to touch  Vascular: 2+ peripheral pulses  Musculoskeletal: 5/5 strength b/l upper and lower extremities  Skin: No rashes  Breast: Deferred   Rectal: Deferred    MEDICATIONS  (STANDING):  aspirin  chewable 81 milliGRAM(s) Oral daily  atorvastatin 20 milliGRAM(s) Oral at bedtime  buDESOnide    EC Capsule 9 milliGRAM(s) Oral daily  cefepime  Injectable. 1000 milliGRAM(s) IV Push every 12 hours  dextrose 5%. 1000 milliLiter(s) (50 mL/Hr) IV Continuous <Continuous>  dextrose 50% Injectable 12.5 Gram(s) IV Push once  dextrose 50% Injectable 25 Gram(s) IV Push once  dextrose 50% Injectable 25 Gram(s) IV Push once  dicyclomine 10 milliGRAM(s) Oral daily  diltiazem    Tablet 120 milliGRAM(s) Oral two times a day  ferrous    sulfate 325 milliGRAM(s) Oral daily  furosemide    Tablet 40 milliGRAM(s) Oral daily  gabapentin 300 milliGRAM(s) Oral three times a day  insulin lispro (HumaLOG) corrective regimen sliding scale   SubCutaneous three times a day before meals  metoprolol succinate  milliGRAM(s) Oral daily  montelukast 10 milliGRAM(s) Oral daily  potassium chloride    Tablet ER 40 milliEquivalent(s) Oral daily  sodium chloride 0.9% lock flush 3 milliLiter(s) IV Push every 8 hours  vancomycin  IVPB 1000 milliGRAM(s) IV Intermittent every 12 hours  warfarin 5 milliGRAM(s) Oral daily    Lab Results:  CBC  CBC Full  -  ( 07 Oct 2018 08:11 )  WBC Count : 9.81 K/uL  Hemoglobin : 11.9 g/dL  Hematocrit : 37.6 %  Platelet Count - Automated : 331 K/uL  Mean Cell Volume : 97.7 fl  Mean Cell Hemoglobin : 30.9 pg  Mean Cell Hemoglobin Concentration : 31.6 gm/dL  Auto Neutrophil # : x  Auto Lymphocyte # : x  Auto Monocyte # : x  Auto Eosinophil # : x  Auto Basophil # : x  Auto Neutrophil % : x  Auto Lymphocyte % : x  Auto Monocyte % : x  Auto Eosinophil % : x  Auto Basophil % : x    .		Differential:	[] Automated		[] Manual  Chemistry                        11.9   9.81  )-----------( 331      ( 07 Oct 2018 08:11 )             37.6     10-07    138  |  104  |  15  ----------------------------<  142<H>  4.0   |  28  |  0.52    Ca    8.4<L>      07 Oct 2018 08:11  Phos  3.0     10-07  Mg     1.8     10-07        PT/INR - ( 07 Oct 2018 08:11 )   PT: 18.7 sec;   INR: 1.71 ratio      MICROBIOLOGY/CULTURES:  Culture Results:   50,000 - 99,000 CFU/mL Pseudomonas aeruginosa  10,000 - 49,000 CFU/mL Enterobacter cloacae/asburiae (10-02 @ 14:50)  Culture Results:   No growth to date. (10-02 @ 13:40)  Culture Results:   No growth to date. (10-02 @ 13:40)      RADIOLOGY/EKG:    < from: Xray Chest 1 View- PORTABLE-Urgent (10.02.18 @ 14:37) >    Impression: No active disease.    < end of copied text >    < from: US Duplex Venous Lower Ext Complete, Bilateral (10.02.18 @ 15:36) >    IMPRESSION:   Unremarkable ultrasound of the right and left lower   extremity deep venous system.    Limited evaluation of the RIGHT   posterior tibial and peroneal veins and soleal and gastrocnemius veins   due to calf bandaging      < end of copied text >

## 2018-10-07 NOTE — PROGRESS NOTE ADULT - ASSESSMENT
B/l lower extremities chronic stasis dermatitis with superimposed RLE chronic venous ulcer with cellulitis   -Leukocytosis  -US - No DVT  -cont cefepime/vanco#5  -continue lasix 40mg IV Q12H  -id consult appreciated   -vascular consult appreciated  - will need daily local wound care with collagenase, xeroform, 4x4 kerlix and ace bandage for compression  -pain meds as needed   -elevated Legs    *Enterobacter and Pseudomonas UTI?  -continue cefepime    Hypokalemia  -replete and monitor     paroxysmal afib, inr- subtherapeutic   -rate controlled on CCB, BB  -cont coumadin    chronic diastolic CHF, euvolemic  -lasix  IV for LE edema  -low Na diet    NIDDM  -hold home po meds   -bgm and sliding scale    abnormal LFTs on previous admission, resolved   - US abd  - fatty liver, nonspecific lesion right lobe and left lobe not seen on prior ultrasound and  CBD dilated new from 2016   - normal TB;  trend lfts, check lipid panel- normal   - will need close GI follow up and MRI  as ouptatient     dvt px  -on coumadin

## 2018-10-08 LAB
ANION GAP SERPL CALC-SCNC: 8 MMOL/L — SIGNIFICANT CHANGE UP (ref 5–17)
BUN SERPL-MCNC: 16 MG/DL — SIGNIFICANT CHANGE UP (ref 7–23)
CALCIUM SERPL-MCNC: 8.8 MG/DL — SIGNIFICANT CHANGE UP (ref 8.5–10.1)
CHLORIDE SERPL-SCNC: 104 MMOL/L — SIGNIFICANT CHANGE UP (ref 96–108)
CO2 SERPL-SCNC: 27 MMOL/L — SIGNIFICANT CHANGE UP (ref 22–31)
CREAT SERPL-MCNC: 0.6 MG/DL — SIGNIFICANT CHANGE UP (ref 0.5–1.3)
GLUCOSE BLDC GLUCOMTR-MCNC: 145 MG/DL — HIGH (ref 70–99)
GLUCOSE BLDC GLUCOMTR-MCNC: 162 MG/DL — HIGH (ref 70–99)
GLUCOSE BLDC GLUCOMTR-MCNC: 205 MG/DL — HIGH (ref 70–99)
GLUCOSE SERPL-MCNC: 215 MG/DL — HIGH (ref 70–99)
INR BLD: 1.81 RATIO — HIGH (ref 0.88–1.16)
POTASSIUM SERPL-MCNC: 4.2 MMOL/L — SIGNIFICANT CHANGE UP (ref 3.5–5.3)
POTASSIUM SERPL-SCNC: 4.2 MMOL/L — SIGNIFICANT CHANGE UP (ref 3.5–5.3)
PROTHROM AB SERPL-ACNC: 19.8 SEC — HIGH (ref 9.8–12.7)
SODIUM SERPL-SCNC: 139 MMOL/L — SIGNIFICANT CHANGE UP (ref 135–145)

## 2018-10-08 RX ORDER — FUROSEMIDE 40 MG
40 TABLET ORAL
Qty: 0 | Refills: 0 | Status: DISCONTINUED | OUTPATIENT
Start: 2018-10-09 | End: 2018-10-11

## 2018-10-08 RX ADMIN — Medication 325 MILLIGRAM(S): at 12:06

## 2018-10-08 RX ADMIN — Medication 40 MILLIEQUIVALENT(S): at 12:06

## 2018-10-08 RX ADMIN — OXYCODONE HYDROCHLORIDE 10 MILLIGRAM(S): 5 TABLET ORAL at 21:31

## 2018-10-08 RX ADMIN — Medication 81 MILLIGRAM(S): at 12:06

## 2018-10-08 RX ADMIN — SODIUM CHLORIDE 3 MILLILITER(S): 9 INJECTION INTRAMUSCULAR; INTRAVENOUS; SUBCUTANEOUS at 21:28

## 2018-10-08 RX ADMIN — Medication 9 MILLIGRAM(S): at 05:16

## 2018-10-08 RX ADMIN — WARFARIN SODIUM 5 MILLIGRAM(S): 2.5 TABLET ORAL at 21:31

## 2018-10-08 RX ADMIN — SIMVASTATIN 5 MILLIGRAM(S): 20 TABLET, FILM COATED ORAL at 21:31

## 2018-10-08 RX ADMIN — Medication 100 MILLIGRAM(S): at 05:16

## 2018-10-08 RX ADMIN — MONTELUKAST 10 MILLIGRAM(S): 4 TABLET, CHEWABLE ORAL at 21:32

## 2018-10-08 RX ADMIN — SODIUM CHLORIDE 3 MILLILITER(S): 9 INJECTION INTRAMUSCULAR; INTRAVENOUS; SUBCUTANEOUS at 13:08

## 2018-10-08 RX ADMIN — CEFEPIME 100 MILLIGRAM(S): 1 INJECTION, POWDER, FOR SOLUTION INTRAMUSCULAR; INTRAVENOUS at 17:44

## 2018-10-08 RX ADMIN — OXYCODONE HYDROCHLORIDE 10 MILLIGRAM(S): 5 TABLET ORAL at 15:18

## 2018-10-08 RX ADMIN — Medication 2: at 07:59

## 2018-10-08 RX ADMIN — SODIUM CHLORIDE 3 MILLILITER(S): 9 INJECTION INTRAMUSCULAR; INTRAVENOUS; SUBCUTANEOUS at 05:17

## 2018-10-08 RX ADMIN — Medication 40 MILLIGRAM(S): at 12:05

## 2018-10-08 RX ADMIN — OXYCODONE HYDROCHLORIDE 10 MILLIGRAM(S): 5 TABLET ORAL at 22:01

## 2018-10-08 RX ADMIN — CEFEPIME 100 MILLIGRAM(S): 1 INJECTION, POWDER, FOR SOLUTION INTRAMUSCULAR; INTRAVENOUS at 05:17

## 2018-10-08 RX ADMIN — Medication 1 APPLICATION(S): at 12:30

## 2018-10-08 RX ADMIN — Medication 4: at 12:05

## 2018-10-08 RX ADMIN — Medication 250 MILLIGRAM(S): at 06:59

## 2018-10-08 RX ADMIN — OXYCODONE HYDROCHLORIDE 10 MILLIGRAM(S): 5 TABLET ORAL at 16:15

## 2018-10-08 RX ADMIN — Medication 250 MILLIGRAM(S): at 18:28

## 2018-10-08 NOTE — PROGRESS NOTE ADULT - SUBJECTIVE AND OBJECTIVE BOX
Vascular Surgery     HPI:  69 y/o female w/ PMH Afib on coumadin, T2D, UC, PVC sent in by vascular surg in [facility] b/c worsening right LE redness/edema.  patient refuses to go to [facility] therefore came back here as she  was hospitalized in 8/2018 for same issue, treated with iv antibiotics. had debridement done 2 weeks ago.  associated with severe pain, yellowish discharge  denies any fever/chills, headache, chest pain, palpitations, nausea/vomiting.     In ED, s/p cefepime/vanco, doppler done-no dvt but limited study on right lower leg.      Seen at bedside now, feels better, remains extremely anxious about right leg ulcer.      PAST MEDICAL HISTORY:  as below    PAST SURGICAL HISTORY: as below    FAMILY HISTORY:   non-contributory to the patient's current presentation (02 Oct 2018 18:47)      PAST MEDICAL & SURGICAL HISTORY:  HTN (hypertension)  Thyroid nodule  Peripheral venous insufficiency  Neuropathy  Morbid obesity with BMI of 40.0-44.9, adult  Dyspnea  Congestive heart failure  Atrial fibrillation  Diabetes mellitus type II, controlled  Status post medial meniscus repair  S/P left knee arthroscopy  Other complications of gastric band procedure  H/O colonoscopy  History of esophagogastroduodenoscopy (EGD)      MEDICATIONS  (STANDING):  aspirin  chewable 81 milliGRAM(s) Oral daily  buDESOnide    EC Capsule 9 milliGRAM(s) Oral daily  cefepime   IVPB 2000 milliGRAM(s) IV Intermittent every 12 hours  collagenase Ointment 1 Application(s) Topical daily  dextrose 5%. 1000 milliLiter(s) (50 mL/Hr) IV Continuous <Continuous>  dextrose 50% Injectable 12.5 Gram(s) IV Push once  dextrose 50% Injectable 25 Gram(s) IV Push once  dextrose 50% Injectable 25 Gram(s) IV Push once  diltiazem    Tablet 120 milliGRAM(s) Oral two times a day  ferrous    sulfate 325 milliGRAM(s) Oral daily  insulin lispro (HumaLOG) corrective regimen sliding scale   SubCutaneous three times a day before meals  metoprolol succinate  milliGRAM(s) Oral daily  montelukast 10 milliGRAM(s) Oral daily  potassium chloride    Tablet ER 40 milliEquivalent(s) Oral daily  simvastatin 5 milliGRAM(s) Oral at bedtime  sodium chloride 0.9% lock flush 3 milliLiter(s) IV Push every 8 hours  vancomycin  IVPB 1750 milliGRAM(s) IV Intermittent every 12 hours  warfarin 5 milliGRAM(s) Oral daily    MEDICATIONS  (PRN):  dextrose 40% Gel 15 Gram(s) Oral once PRN Blood Glucose LESS THAN 70 milliGRAM(s)/deciliter  glucagon  Injectable 1 milliGRAM(s) IntraMuscular once PRN Glucose LESS THAN 70 milligrams/deciliter  oxyCODONE    5 mG/acetaminophen 325 mG 1 Tablet(s) Oral every 6 hours PRN Moderate Pain (4 - 6)  oxyCODONE    IR 10 milliGRAM(s) Oral every 6 hours PRN Severe Pain (7 - 10)      Allergies    penicillin (Hives)    Intolerances        Vital Signs Last 24 Hrs  T(C): 36.8 (08 Oct 2018 12:05), Max: 36.8 (08 Oct 2018 12:05)  T(F): 98.2 (08 Oct 2018 12:05), Max: 98.2 (08 Oct 2018 12:05)  HR: 75 (08 Oct 2018 12:05) (60 - 79)  BP: 149/77 (08 Oct 2018 12:05) (135/59 - 149/77)  BP(mean): --  RR: 18 (08 Oct 2018 12:05) (16 - 18)  SpO2: 100% (08 Oct 2018 12:05) (97% - 100%)    I&O's Summary    08 Oct 2018 07:01  -  08 Oct 2018 19:47  --------------------------------------------------------  IN: 910 mL / OUT: 0 mL / NET: 910 mL        Physical Exam:  General: NAD  Respiratory: Nonlabored  Cardiovascular: RRR  Abdominal: Soft, nondistended, nontender  Extremities: Warm  Pulse exam: Palpable DP pulses    LABS:                        11.9   9.81  )-----------( 331      ( 07 Oct 2018 08:11 )             37.6     10-08    139  |  104  |  16  ----------------------------<  215<H>  4.2   |  27  |  0.60    Ca    8.8      08 Oct 2018 08:44  Phos  3.0     10-07  Mg     1.8     10-07      PT/INR - ( 08 Oct 2018 08:44 )   PT: 19.8 sec;   INR: 1.81 ratio                 IMAGING: No DVT    Assessment/Plan: 68yFemale presents with edema, erythema and cellulitis right leg, edema of left leg. Had been following with Dr. Rivera, with UB dressing changes. Worsened and developed cellulitis. Now improved. Venous stasis likely contributing as well as secondary lymphedema.    -Continue abx per ID  -Collagenase and ACE bandage to right leg  -ACE bandage to left leg  -Will need outpatient follow up for wound care  -Will follow while in hospital

## 2018-10-08 NOTE — PROGRESS NOTE ADULT - SUBJECTIVE AND OBJECTIVE BOX
Date of service: 10-08-18 @ 11:59    Lying in bed in NAD  Right calf pain is improving  Left dorsal foot feels more swollen and new patch or erythema    ROS: no fever or chills; denies dizziness, no HA, no SOB or cough, no abdominal pain, no diarrhea or constipation; no dysuria    MEDICATIONS  (STANDING):  aspirin  chewable 81 milliGRAM(s) Oral daily  buDESOnide    EC Capsule 9 milliGRAM(s) Oral daily  cefepime   IVPB 2000 milliGRAM(s) IV Intermittent every 12 hours  collagenase Ointment 1 Application(s) Topical daily  dextrose 5%. 1000 milliLiter(s) (50 mL/Hr) IV Continuous <Continuous>  dextrose 50% Injectable 12.5 Gram(s) IV Push once  dextrose 50% Injectable 25 Gram(s) IV Push once  dextrose 50% Injectable 25 Gram(s) IV Push once  diltiazem    Tablet 120 milliGRAM(s) Oral two times a day  ferrous    sulfate 325 milliGRAM(s) Oral daily  furosemide   Injectable 40 milliGRAM(s) IV Push every 12 hours  insulin lispro (HumaLOG) corrective regimen sliding scale   SubCutaneous three times a day before meals  metoprolol succinate  milliGRAM(s) Oral daily  montelukast 10 milliGRAM(s) Oral daily  potassium chloride    Tablet ER 40 milliEquivalent(s) Oral daily  simvastatin 5 milliGRAM(s) Oral at bedtime  sodium chloride 0.9% lock flush 3 milliLiter(s) IV Push every 8 hours  vancomycin  IVPB 1750 milliGRAM(s) IV Intermittent every 12 hours  warfarin 5 milliGRAM(s) Oral daily      Vital Signs Last 24 Hrs  T(C): 36.4 (08 Oct 2018 05:12), Max: 36.6 (07 Oct 2018 21:03)  T(F): 97.6 (08 Oct 2018 05:12), Max: 97.8 (07 Oct 2018 21:03)  HR: 79 (08 Oct 2018 05:12) (60 - 79)  BP: 140/60 (08 Oct 2018 05:12) (135/59 - 140/60)  BP(mean): --  RR: 16 (08 Oct 2018 05:12) (16 - 17)  SpO2: 98% (08 Oct 2018 05:12) (97% - 98%)    Physical Exam:      Constitutional: frail looking  HEENT: NC/AT, EOMI, PERRLA, conjunctivae clear  Neck: supple; thyroid not palpable  Back: no tenderness  Respiratory: respiratory effort normal; clear to auscultation  Cardiovascular: S1S2 regular, no murmurs  Abdomen: soft, not tender, not distended, positive BS  Genitourinary: no suprapubic tenderness  Lymphatic: no LN palpable  Musculoskeletal: no muscle tenderness, no joint swelling or tenderness  Extremities: 2 + pedal edema  RLE calf ulcer with scant discharge; right calf brown discoloration and edema; less tenderness with palpation  Left dorsal aspect of foot area of erythema and edema; Left ankle chronic skin changes; no break in skin  Neurological/ Psychiatric: AxOx3, judgement and insight normal; moving all extremities  Skin: no rashes; no palpable lesions    Labs: reviewed                        11.9   9.81  )-----------( 331      ( 07 Oct 2018 08:11 )             37.6     10    139  |  104  |  16  ----------------------------<  215<H>  4.2   |  27  |  0.60    Ca    8.8      08 Oct 2018 08:44  Phos  3.0     10-  Mg     1.8     10-              Vancomycin Level, Trough: 13.7 ug/mL (10-05 @ 05:33)                        13.7   13.11 )-----------( 398      ( 04 Oct 2018 07:37 )             42.2     10-    134<L>  |  98  |  15  ----------------------------<  201<H>  3.7   |  26  |  0.72    Ca    8.3<L>      04 Oct 2018 07:37  Phos  3.3     10-  Mg     1.5     10-    TPro  6.7  /  Alb  2.7<L>  /  TBili  0.5  /  DBili  x   /  AST  12<L>  /  ALT  26  /  AlkPhos  71  10                        12.6   11.20 )-----------( 359      ( 03 Oct 2018 07:31 )             39.2     10    137  |  98  |  16  ----------------------------<  162<H>  3.4<L>   |  31  |  0.52    Ca    8.3<L>      03 Oct 2018 07:31  Phos  2.9     10-03  Mg     1.5     10-03    TPro  6.7  /  Alb  2.7<L>  /  TBili  0.5  /  DBili  x   /  AST  12<L>  /  ALT  26  /  AlkPhos  71  10-03     LIVER FUNCTIONS - ( 03 Oct 2018 07:31 )  Alb: 2.7 g/dL / Pro: 6.7 gm/dL / ALK PHOS: 71 U/L / ALT: 26 U/L / AST: 12 U/L / GGT: x           Urinalysis Basic - ( 02 Oct 2018 14:50 )    Color: Yellow / Appearance: Clear / S.025 / pH: x  Gluc: x / Ketone: Trace  / Bili: Negative / Urobili: Negative mg/dL   Blood: x / Protein: 15 mg/dL / Nitrite: Negative   Leuk Esterase: Small / RBC: 3-5 /HPF / WBC 3-5   Sq Epi: x / Non Sq Epi: Moderate / Bacteria: Occasional      Culture - Urine (collected 02 Oct 2018 14:50)  Source: .Urine None  Preliminary Report (03 Oct 2018 21:00):    50,000 - 99,000 CFU/mL Pseudomonas aeruginosa    Culture - Blood (collected 02 Oct 2018 13:40)  Source: .Blood Blood  Preliminary Report (03 Oct 2018 18:02):    No growth to date.    Culture - Blood (collected 02 Oct 2018 13:40)  Source: .Blood Blood  Preliminary Report (03 Oct 2018 18:02):    No growth to date.        Radiology: all available radiological tests reviewed    Advanced directives addressed: full resuscitation

## 2018-10-08 NOTE — PROGRESS NOTE ADULT - ASSESSMENT
B/l lower extremities chronic stasis dermatitis with superimposed RLE chronic venous ulcer with cellulitis   -Leukocytosis  -US - No DVT  -cont cefepime/vanco#5  -S/P lasix 40mg IV Q12H Switch to Lasix PO Q12H  -id consult appreciated   -vascular consult appreciated  - will need daily local wound care with collagenase, xeroform, 4x4 kerlix and ace bandage for compression  -pain meds as needed   -elevated Legs    *Enterobacter and Pseudomonas UTI?  -continue cefepime    *Hypokalemia  -replete and monitor     paroxysmal afib, inr- subtherapeutic   -rate controlled on CCB, BB  -cont coumadin    chronic diastolic CHF, euvolemic  -lasix  PO for LE edema  -low Na diet    NIDDM  -hold home po meds   -bgm and sliding scale    abnormal LFTs on previous admission, resolved   - US abd  - fatty liver, nonspecific lesion right lobe and left lobe not seen on prior ultrasound and  CBD dilated new from 2016   - normal TB;  trend lfts, check lipid panel- normal   - will need close GI follow up and MRI  as ouptatient     dvt px  -on coumadin

## 2018-10-08 NOTE — PROGRESS NOTE ADULT - ASSESSMENT
69 y/o female with h/o A.fib on coumadin, T2D, UC, PVC was admitted on 10/2 for left lower extremity redness. She was referred to the hospital by vascular surgeon in [place] for worsening right LE redness/edema. The patient was hospitalized in 8/2018 for similar issue, treated with IV antibiotics. She reports a debridement done 2 weeks PTA. She has severe LLE pain, yellowish discharge. Denies fever or chills at home. In ER she received vancomycin IV ans cefepime.      1. B/l lower extremities chronic stasis dermatitis with superimposed RLE cellulitis. PVD. Allergy to PCN.   -new left foot dorsal aspect erythema ?cause ?dermatitis ?new cellulitis  -leukocytosis resolved  -legs pain is improved  -on vancomycin 1.750 gm IV q12h and cefepime 2 gm IV q12h # 5  -tolerating abx well so far; no side effects noted  -vancomycin trough level is therapeutic  -monitor closely in ej of PCN allergy history  -elevate legs  -continue abx coverage  -victor manuel management  -monitor temps  -f/u CBC  -supportive care  2. Other issues:   -care per medicine

## 2018-10-08 NOTE — PROGRESS NOTE ADULT - SUBJECTIVE AND OBJECTIVE BOX
HOSPITALIST PROGRESS NOTE:  SUBJECTIVE:  PCP:  Chief Complaint: Patient is a 68y old  Female who presents with a chief complaint of LE reddness/pain (02 Oct 2018 18:47)      HPI:  69 y/o female w/ PMH Afib on coumadin, T2D, UC, PVC sent in by vascular surg in [facility] b/c worsening right LE redness/edema.  patient refuses to go to [facility] therefore came back here as she  was hospitalized in 8/2018 for same issue, treated with iv antibiotics. had debridement done 2 weeks ago.  associated with severe pain, yellowish discharge  denies any fever/chills, headache, chest pain, palpitations, nausea/vomiting.   In ED, s/p cefepime/vanco, doppler done-no dvt but limited study on right lower leg.    10/3:  Above reviewed; C/o of LE pain R>L  10/4:  still having pain in her legs. Less redness   10/5:  Improving. still red and swollen  10/6: This morning patient didnt take the lasix; She felt anxious and it felt like she was having a panic attack; Upset about her legs and the fact that she is in the hospital. she is missing her grandsons game.  10/7: states RLE improving but LLE still swollen  10/8: Legs improving     Allergies:  penicillin (Hives)    REVIEW OF SYSTEMS:  See HPI. All other review of systems is negative unless indicated above.     OBJECTIVE  Physical Exam:  Vital Signs Last 24 Hrs  T(C): 36.8 (08 Oct 2018 12:05), Max: 36.8 (08 Oct 2018 12:05)  T(F): 98.2 (08 Oct 2018 12:05), Max: 98.2 (08 Oct 2018 12:05)  HR: 75 (08 Oct 2018 12:05) (60 - 79)  BP: 149/77 (08 Oct 2018 12:05) (135/59 - 149/77)  BP(mean): --  RR: 18 (08 Oct 2018 12:05) (16 - 18)  SpO2: 100% (08 Oct 2018 12:05) (97% - 100%)    Constitutional: NAD, awake and alert, well-developed  Neurological: AAO x 3, no focal deficits  HEENT: PERRLA, EOMI, MMM  Neck: Soft and supple, No LAD, No JVD  Respiratory: Breath sounds are clear bilaterally, No wheezing, rales or rhonchi  Cardiovascular: S1 and S2, regular rate and rhythm; no Murmurs, gallops or rubs  Gastrointestinal: Bowel Sounds present, soft, nontender, nondistended, no guarding, no rebound tenderness  Back: No CVA tenderness   Extremities:  +chronic venous stasis changes with reddness bilaterally.  +posterior ulcer with granulation tissue on right lower leg.  no discharge, bleeding or malodor.  +tender to touch  Vascular: 2+ peripheral pulses  Musculoskeletal: 5/5 strength b/l upper and lower extremities  Skin: No rashes  Breast: Deferred   Rectal: Deferred    MEDICATIONS  (STANDING):  aspirin  chewable 81 milliGRAM(s) Oral daily  atorvastatin 20 milliGRAM(s) Oral at bedtime  buDESOnide    EC Capsule 9 milliGRAM(s) Oral daily  cefepime  Injectable. 1000 milliGRAM(s) IV Push every 12 hours  dextrose 5%. 1000 milliLiter(s) (50 mL/Hr) IV Continuous <Continuous>  dextrose 50% Injectable 12.5 Gram(s) IV Push once  dextrose 50% Injectable 25 Gram(s) IV Push once  dextrose 50% Injectable 25 Gram(s) IV Push once  dicyclomine 10 milliGRAM(s) Oral daily  diltiazem    Tablet 120 milliGRAM(s) Oral two times a day  ferrous    sulfate 325 milliGRAM(s) Oral daily  furosemide    Tablet 40 milliGRAM(s) Oral daily  gabapentin 300 milliGRAM(s) Oral three times a day  insulin lispro (HumaLOG) corrective regimen sliding scale   SubCutaneous three times a day before meals  metoprolol succinate  milliGRAM(s) Oral daily  montelukast 10 milliGRAM(s) Oral daily  potassium chloride    Tablet ER 40 milliEquivalent(s) Oral daily  sodium chloride 0.9% lock flush 3 milliLiter(s) IV Push every 8 hours  vancomycin  IVPB 1000 milliGRAM(s) IV Intermittent every 12 hours  warfarin 5 milliGRAM(s) Oral daily    Lab Results:  CBC  CBC Full  -  ( 07 Oct 2018 08:11 )  WBC Count : 9.81 K/uL  Hemoglobin : 11.9 g/dL  Hematocrit : 37.6 %  Platelet Count - Automated : 331 K/uL  Mean Cell Volume : 97.7 fl  Mean Cell Hemoglobin : 30.9 pg  Mean Cell Hemoglobin Concentration : 31.6 gm/dL  Auto Neutrophil # : x  Auto Lymphocyte # : x  Auto Monocyte # : x  Auto Eosinophil # : x  Auto Basophil # : x  Auto Neutrophil % : x  Auto Lymphocyte % : x  Auto Monocyte % : x  Auto Eosinophil % : x  Auto Basophil % : x    .		Differential:	[] Automated		[] Manual  Chemistry                        11.9   9.81  )-----------( 331      ( 07 Oct 2018 08:11 )             37.6     10-08    139  |  104  |  16  ----------------------------<  215<H>  4.2   |  27  |  0.60    Ca    8.8      08 Oct 2018 08:44  Phos  3.0     10-07  Mg     1.8     10-07    PT/INR - ( 08 Oct 2018 08:44 )   PT: 19.8 sec;   INR: 1.81 ratio       MICROBIOLOGY/CULTURES:  Culture Results:   50,000 - 99,000 CFU/mL Pseudomonas aeruginosa  10,000 - 49,000 CFU/mL Enterobacter cloacae/asburiae (10-02 @ 14:50)  Culture Results:   No growth at 5 days. (10-02 @ 13:40)  Culture Results:   No growth at 5 days. (10-02 @ 13:40)    RADIOLOGY/EKG:    < from: Xray Chest 1 View- PORTABLE-Urgent (10.02.18 @ 14:37) >    Impression: No active disease.    < end of copied text >    < from: US Duplex Venous Lower Ext Complete, Bilateral (10.02.18 @ 15:36) >    IMPRESSION:   Unremarkable ultrasound of the right and left lower   extremity deep venous system.    Limited evaluation of the RIGHT   posterior tibial and peroneal veins and soleal and gastrocnemius veins   due to calf bandaging      < end of copied text >

## 2018-10-09 LAB
ANION GAP SERPL CALC-SCNC: 8 MMOL/L — SIGNIFICANT CHANGE UP (ref 5–17)
BUN SERPL-MCNC: 20 MG/DL — SIGNIFICANT CHANGE UP (ref 7–23)
CALCIUM SERPL-MCNC: 8.4 MG/DL — LOW (ref 8.5–10.1)
CHLORIDE SERPL-SCNC: 105 MMOL/L — SIGNIFICANT CHANGE UP (ref 96–108)
CO2 SERPL-SCNC: 27 MMOL/L — SIGNIFICANT CHANGE UP (ref 22–31)
CREAT SERPL-MCNC: 0.54 MG/DL — SIGNIFICANT CHANGE UP (ref 0.5–1.3)
GLUCOSE BLDC GLUCOMTR-MCNC: 145 MG/DL — HIGH (ref 70–99)
GLUCOSE BLDC GLUCOMTR-MCNC: 169 MG/DL — HIGH (ref 70–99)
GLUCOSE BLDC GLUCOMTR-MCNC: 184 MG/DL — HIGH (ref 70–99)
GLUCOSE BLDC GLUCOMTR-MCNC: 206 MG/DL — HIGH (ref 70–99)
GLUCOSE SERPL-MCNC: 138 MG/DL — HIGH (ref 70–99)
INR BLD: 1.77 RATIO — HIGH (ref 0.88–1.16)
POTASSIUM SERPL-MCNC: 4.1 MMOL/L — SIGNIFICANT CHANGE UP (ref 3.5–5.3)
POTASSIUM SERPL-SCNC: 4.1 MMOL/L — SIGNIFICANT CHANGE UP (ref 3.5–5.3)
PROTHROM AB SERPL-ACNC: 19.3 SEC — HIGH (ref 9.8–12.7)
SODIUM SERPL-SCNC: 140 MMOL/L — SIGNIFICANT CHANGE UP (ref 135–145)

## 2018-10-09 RX ADMIN — WARFARIN SODIUM 5 MILLIGRAM(S): 2.5 TABLET ORAL at 22:52

## 2018-10-09 RX ADMIN — Medication 40 MILLIEQUIVALENT(S): at 09:06

## 2018-10-09 RX ADMIN — Medication 100 MILLIGRAM(S): at 05:15

## 2018-10-09 RX ADMIN — Medication 40 MILLIGRAM(S): at 09:06

## 2018-10-09 RX ADMIN — Medication 325 MILLIGRAM(S): at 09:06

## 2018-10-09 RX ADMIN — SODIUM CHLORIDE 3 MILLILITER(S): 9 INJECTION INTRAMUSCULAR; INTRAVENOUS; SUBCUTANEOUS at 13:20

## 2018-10-09 RX ADMIN — Medication 9 MILLIGRAM(S): at 05:15

## 2018-10-09 RX ADMIN — CEFEPIME 100 MILLIGRAM(S): 1 INJECTION, POWDER, FOR SOLUTION INTRAMUSCULAR; INTRAVENOUS at 17:07

## 2018-10-09 RX ADMIN — OXYCODONE HYDROCHLORIDE 10 MILLIGRAM(S): 5 TABLET ORAL at 11:25

## 2018-10-09 RX ADMIN — CEFEPIME 100 MILLIGRAM(S): 1 INJECTION, POWDER, FOR SOLUTION INTRAMUSCULAR; INTRAVENOUS at 05:12

## 2018-10-09 RX ADMIN — SODIUM CHLORIDE 3 MILLILITER(S): 9 INJECTION INTRAMUSCULAR; INTRAVENOUS; SUBCUTANEOUS at 05:10

## 2018-10-09 RX ADMIN — MONTELUKAST 10 MILLIGRAM(S): 4 TABLET, CHEWABLE ORAL at 22:52

## 2018-10-09 RX ADMIN — Medication 1 APPLICATION(S): at 11:57

## 2018-10-09 RX ADMIN — Medication 2: at 09:03

## 2018-10-09 RX ADMIN — Medication 2: at 11:57

## 2018-10-09 RX ADMIN — OXYCODONE HYDROCHLORIDE 10 MILLIGRAM(S): 5 TABLET ORAL at 22:52

## 2018-10-09 RX ADMIN — SIMVASTATIN 5 MILLIGRAM(S): 20 TABLET, FILM COATED ORAL at 22:52

## 2018-10-09 RX ADMIN — Medication 81 MILLIGRAM(S): at 09:06

## 2018-10-09 RX ADMIN — SODIUM CHLORIDE 3 MILLILITER(S): 9 INJECTION INTRAMUSCULAR; INTRAVENOUS; SUBCUTANEOUS at 22:37

## 2018-10-09 RX ADMIN — Medication 250 MILLIGRAM(S): at 06:13

## 2018-10-09 NOTE — PROGRESS NOTE ADULT - SUBJECTIVE AND OBJECTIVE BOX
HOSPITALIST PROGRESS NOTE:  SUBJECTIVE:  PCP:  Chief Complaint: Patient is a 68y old  Female who presents with a chief complaint of LE reddness/pain (02 Oct 2018 18:47)      HPI:  67 y/o female w/ PMH Afib on coumadin, T2D, UC, PVC sent in by vascular surg in [facility] b/c worsening right LE redness/edema.  patient refuses to go to [facility] therefore came back here as she  was hospitalized in 8/2018 for same issue, treated with iv antibiotics. had debridement done 2 weeks ago.  associated with severe pain, yellowish discharge  denies any fever/chills, headache, chest pain, palpitations, nausea/vomiting.   In ED, s/p cefepime/vanco, doppler done-no dvt but limited study on right lower leg.    10/3:  Above reviewed; C/o of LE pain R>L  10/4:  still having pain in her legs. Less redness   10/5:  Improving. still red and swollen  10/6: This morning patient didnt take the lasix; She felt anxious and it felt like she was having a panic attack; Upset about her legs and the fact that she is in the hospital. she is missing her grandsons game.  10/7: states RLE improving but LLE still swollen  10/8: Legs improving   10/9: Improving slowly.  HD stable, Denies fever, chills, N, V, abd pain, CP, SOB.    Allergies:  penicillin (Hives)    REVIEW OF SYSTEMS:  See HPI. All other review of systems is negative unless indicated above.     OBJECTIVE  Vital Signs Last 24 Hrs  T(C): 36.6 (09 Oct 2018 11:24), Max: 36.8 (09 Oct 2018 05:17)  T(F): 97.9 (09 Oct 2018 11:24), Max: 98.3 (09 Oct 2018 05:17)  HR: 76 (09 Oct 2018 11:24) (74 - 85)  BP: 135/65 (09 Oct 2018 11:24) (121/69 - 153/70)  BP(mean): --  RR: 18 (09 Oct 2018 11:24) (16 - 18)  SpO2: 99% (09 Oct 2018 11:24) (96% - 99%)    Constitutional: NAD, awake and alert, well-developed  Neurological: AAO x 3, no focal deficits  HEENT: PERRLA, EOMI, MMM  Neck: Soft and supple, No LAD, No JVD  Respiratory: Breath sounds are clear bilaterally, No wheezing, rales or rhonchi  Cardiovascular: S1 and S2, regular rate and rhythm; no Murmurs, gallops or rubs  Gastrointestinal: Bowel Sounds present, soft, nontender, nondistended, no guarding, no rebound tenderness  Back: No CVA tenderness   Extremities:  +chronic venous stasis changes with reddness bilaterally.  +posterior ulcer with granulation tissue on right lower leg.  no discharge, bleeding or malodor.  +tender to touch  Vascular: 2+ peripheral pulses  Musculoskeletal: 5/5 strength b/l upper and lower extremities  Skin: No rashes  Breast: Deferred   Rectal: Deferred    MEDICATIONS  (STANDING):  aspirin  chewable 81 milliGRAM(s) Oral daily  buDESOnide    EC Capsule 9 milliGRAM(s) Oral daily  cefepime   IVPB 2000 milliGRAM(s) IV Intermittent every 12 hours  collagenase Ointment 1 Application(s) Topical daily  dextrose 5%. 1000 milliLiter(s) (50 mL/Hr) IV Continuous <Continuous>  dextrose 50% Injectable 12.5 Gram(s) IV Push once  dextrose 50% Injectable 25 Gram(s) IV Push once  dextrose 50% Injectable 25 Gram(s) IV Push once  diltiazem    Tablet 120 milliGRAM(s) Oral two times a day  ferrous    sulfate 325 milliGRAM(s) Oral daily  furosemide    Tablet 40 milliGRAM(s) Oral two times a day  insulin lispro (HumaLOG) corrective regimen sliding scale   SubCutaneous three times a day before meals  metoprolol succinate  milliGRAM(s) Oral daily  montelukast 10 milliGRAM(s) Oral daily  potassium chloride    Tablet ER 40 milliEquivalent(s) Oral daily  simvastatin 5 milliGRAM(s) Oral at bedtime  sodium chloride 0.9% lock flush 3 milliLiter(s) IV Push every 8 hours  warfarin 5 milliGRAM(s) Oral daily    MEDICATIONS  (PRN):  dextrose 40% Gel 15 Gram(s) Oral once PRN Blood Glucose LESS THAN 70 milliGRAM(s)/deciliter  glucagon  Injectable 1 milliGRAM(s) IntraMuscular once PRN Glucose LESS THAN 70 milligrams/deciliter  oxyCODONE    5 mG/acetaminophen 325 mG 1 Tablet(s) Oral every 6 hours PRN Moderate Pain (4 - 6)  oxyCODONE    IR 10 milliGRAM(s) Oral every 6 hours PRN Severe Pain (7 - 10)            10-09    140  |  105  |  20  ----------------------------<  138<H>  4.1   |  27  |  0.54    Ca    8.4<L>      09 Oct 2018 06:01      CAPILLARY BLOOD GLUCOSE      POCT Blood Glucose.: 169 mg/dL (09 Oct 2018 11:56)  POCT Blood Glucose.: 184 mg/dL (09 Oct 2018 08:07)  POCT Blood Glucose.: 145 mg/dL (08 Oct 2018 17:01)      PT/INR - ( 09 Oct 2018 06:01 )   PT: 19.3 sec;   INR: 1.77 ratio           Assessment and Plan:   · Assessment		    B/l lower extremities chronic stasis dermatitis with superimposed RLE chronic venous ulcer with cellulitis   -Leukocytosis resolved.   -US - No DVT  -cont cefepime/vanco#6  -vanco d/c per ID  -S/P lasix 40mg IV Q12H Switch to Lasix PO Q12H  -vascular consult appreciated  - will need daily local wound care with collagenase, xeroform, 4x4 kerlix and ace bandage for compression.  outpt follow up Dr. Yoo.   -pain meds as needed   -elevated Legs    Enterobacter and Pseudomonas UTI: Asymptomatic - on antibiotics for above.    paroxysmal afib:  -rate controlled on CCB, BB  -cont coumadin for goal INR 2-3    chronic diastolic CHF, euvolemic  -lasix  PO for LE edema  -low Na diet    NIDDM  -hold home po meds   -bgm and sliding scale    abnormal LFTs on previous admission, resolved   - US abd  - fatty liver, nonspecific lesion right lobe and left lobe not seen on prior ultrasound and  CBD dilated new from 2016   - normal TB;  trend lfts, check lipid panel- normal   - will need close GI follow up and MRI  as ouptatient     dvt px  -on coumadin    Dispo:  -transition to oral antibiotics/Doxy, per ID recs tomorrow   -wound care/home care upon d/c   -discharge with outpatient vascular and pcp follow up.

## 2018-10-09 NOTE — PROGRESS NOTE ADULT - ASSESSMENT
67 y/o female with h/o A.fib on coumadin, T2D, UC, PVC was admitted on 10/2 for left lower extremity redness. She was referred to the hospital by vascular surgeon in [facility] for worsening right LE redness/edema. The patient was hospitalized in 8/2018 for similar issue, treated with IV antibiotics. She reports a debridement done 2 weeks PTA. She has severe LLE pain, yellowish discharge. Denies fever or chills at home. In ER she received vancomycin IV ans cefepime.      1. B/l lower extremities chronic stasis dermatitis with superimposed RLE cellulitis. PVD. Allergy to PCN.   -new left foot dorsal aspect erythema and edema are improved s/p ACE wrap  -legs pain is improved  -on vancomycin 1.750 gm IV q12h and cefepime 2 gm IV q12h # 6  -tolerating abx well so far; no side effects noted  -d/c vancomycin   -monitor closely in ej of PCN allergy history  -elevate legs  -continue abx coverage with cefepime for now; plan to change to oral abx in AM  -victor manuel management  -monitor temps  -f/u CBC  -supportive care  2. Other issues:   -care per medicine

## 2018-10-09 NOTE — PROGRESS NOTE ADULT - SUBJECTIVE AND OBJECTIVE BOX
Date of service: 10-09-18 @ 14:16    Lying in bed in NAD  Both legs are wrapped with ACE bandages and are less swollen  No leg pain at rest    ROS: no fever or chills; denies dizziness, no HA, no SOB or cough, no abdominal pain, no diarrhea or constipation; no dysuria    MEDICATIONS  (STANDING):  aspirin  chewable 81 milliGRAM(s) Oral daily  buDESOnide    EC Capsule 9 milliGRAM(s) Oral daily  cefepime   IVPB 2000 milliGRAM(s) IV Intermittent every 12 hours  collagenase Ointment 1 Application(s) Topical daily  dextrose 5%. 1000 milliLiter(s) (50 mL/Hr) IV Continuous <Continuous>  dextrose 50% Injectable 12.5 Gram(s) IV Push once  dextrose 50% Injectable 25 Gram(s) IV Push once  dextrose 50% Injectable 25 Gram(s) IV Push once  diltiazem    Tablet 120 milliGRAM(s) Oral two times a day  ferrous    sulfate 325 milliGRAM(s) Oral daily  furosemide    Tablet 40 milliGRAM(s) Oral two times a day  insulin lispro (HumaLOG) corrective regimen sliding scale   SubCutaneous three times a day before meals  metoprolol succinate  milliGRAM(s) Oral daily  montelukast 10 milliGRAM(s) Oral daily  potassium chloride    Tablet ER 40 milliEquivalent(s) Oral daily  simvastatin 5 milliGRAM(s) Oral at bedtime  sodium chloride 0.9% lock flush 3 milliLiter(s) IV Push every 8 hours  warfarin 5 milliGRAM(s) Oral daily      Vital Signs Last 24 Hrs  T(C): 36.6 (09 Oct 2018 11:24), Max: 36.8 (09 Oct 2018 05:17)  T(F): 97.9 (09 Oct 2018 11:24), Max: 98.3 (09 Oct 2018 05:17)  HR: 76 (09 Oct 2018 11:24) (74 - 85)  BP: 135/65 (09 Oct 2018 11:24) (121/69 - 153/70)  BP(mean): --  RR: 18 (09 Oct 2018 11:24) (16 - 18)  SpO2: 99% (09 Oct 2018 11:24) (96% - 99%)    Physical Exam:      Constitutional: frail looking  HEENT: NC/AT, EOMI, PERRLA, conjunctivae clear  Neck: supple; thyroid not palpable  Back: no tenderness  Respiratory: respiratory effort normal; clear to auscultation  Cardiovascular: S1S2 regular, no murmurs  Abdomen: soft, not tender, not distended, positive BS  Genitourinary: no suprapubic tenderness  Lymphatic: no LN palpable  Musculoskeletal: no muscle tenderness, no joint swelling or tenderness  Extremities: 2 + pedal edema  RLE calf ulcer with scant discharge; right calf brown discoloration; edema and tenderness are much improved  Left dorsal aspect of foot area of erythema and edema; Left ankle chronic skin changes; no break in skin - edema is improving  Neurological/ Psychiatric: AxOx3, moving all extremities  Skin: no rashes; no palpable lesions    Labs: reviewed    10-09    140  |  105  |  20  ----------------------------<  138<H>  4.1   |  27  |  0.54    Ca    8.4<L>      09 Oct 2018 06:01                        11.9   9.81  )-----------( 331      ( 07 Oct 2018 08:11 )             37.6     10-08    139  |  104  |  16  ----------------------------<  215<H>  4.2   |  27  |  0.60    Ca    8.8      08 Oct 2018 08:44  Phos  3.0     10-  Mg     1.8     10-07              Vancomycin Level, Trough: 13.7 ug/mL (10-05 @ 05:33)                                  12.6   11.20 )-----------( 359      ( 03 Oct 2018 07:31 )             39.2     10-03    137  |  98  |  16  ----------------------------<  162<H>  3.4<L>   |  31  |  0.52    Ca    8.3<L>      03 Oct 2018 07:31  Phos  2.9     10-03  Mg     1.5     10-03    TPro  6.7  /  Alb  2.7<L>  /  TBili  0.5  /  DBili  x   /  AST  12<L>  /  ALT  26  /  AlkPhos  71  10-03     LIVER FUNCTIONS - ( 03 Oct 2018 07:31 )  Alb: 2.7 g/dL / Pro: 6.7 gm/dL / ALK PHOS: 71 U/L / ALT: 26 U/L / AST: 12 U/L / GGT: x           Urinalysis Basic - ( 02 Oct 2018 14:50 )    Color: Yellow / Appearance: Clear / S.025 / pH: x  Gluc: x / Ketone: Trace  / Bili: Negative / Urobili: Negative mg/dL   Blood: x / Protein: 15 mg/dL / Nitrite: Negative   Leuk Esterase: Small / RBC: 3-5 /HPF / WBC 3-5   Sq Epi: x / Non Sq Epi: Moderate / Bacteria: Occasional      Culture - Urine (collected 02 Oct 2018 14:50)  Source: .Urine None  Preliminary Report (03 Oct 2018 21:00):    50,000 - 99,000 CFU/mL Pseudomonas aeruginosa    Culture - Blood (collected 02 Oct 2018 13:40)  Source: .Blood Blood  Preliminary Report (03 Oct 2018 18:02):    No growth to date.    Culture - Blood (collected 02 Oct 2018 13:40)  Source: .Blood Blood  Preliminary Report (03 Oct 2018 18:02):    No growth to date.        Radiology: all available radiological tests reviewed    Advanced directives addressed: full resuscitation

## 2018-10-10 LAB
GLUCOSE BLDC GLUCOMTR-MCNC: 151 MG/DL — HIGH (ref 70–99)
GLUCOSE BLDC GLUCOMTR-MCNC: 156 MG/DL — HIGH (ref 70–99)
GLUCOSE BLDC GLUCOMTR-MCNC: 169 MG/DL — HIGH (ref 70–99)
GLUCOSE BLDC GLUCOMTR-MCNC: 180 MG/DL — HIGH (ref 70–99)
INR BLD: 1.85 RATIO — HIGH (ref 0.88–1.16)
PROTHROM AB SERPL-ACNC: 20.2 SEC — HIGH (ref 9.8–12.7)

## 2018-10-10 RX ADMIN — SODIUM CHLORIDE 3 MILLILITER(S): 9 INJECTION INTRAMUSCULAR; INTRAVENOUS; SUBCUTANEOUS at 21:06

## 2018-10-10 RX ADMIN — MONTELUKAST 10 MILLIGRAM(S): 4 TABLET, CHEWABLE ORAL at 21:06

## 2018-10-10 RX ADMIN — Medication 81 MILLIGRAM(S): at 11:03

## 2018-10-10 RX ADMIN — SIMVASTATIN 5 MILLIGRAM(S): 20 TABLET, FILM COATED ORAL at 21:06

## 2018-10-10 RX ADMIN — Medication 325 MILLIGRAM(S): at 11:03

## 2018-10-10 RX ADMIN — CEFEPIME 100 MILLIGRAM(S): 1 INJECTION, POWDER, FOR SOLUTION INTRAMUSCULAR; INTRAVENOUS at 05:08

## 2018-10-10 RX ADMIN — Medication 40 MILLIEQUIVALENT(S): at 11:03

## 2018-10-10 RX ADMIN — Medication 100 MILLIGRAM(S): at 05:09

## 2018-10-10 RX ADMIN — OXYCODONE AND ACETAMINOPHEN 1 TABLET(S): 5; 325 TABLET ORAL at 21:15

## 2018-10-10 RX ADMIN — SODIUM CHLORIDE 3 MILLILITER(S): 9 INJECTION INTRAMUSCULAR; INTRAVENOUS; SUBCUTANEOUS at 05:09

## 2018-10-10 RX ADMIN — SODIUM CHLORIDE 3 MILLILITER(S): 9 INJECTION INTRAMUSCULAR; INTRAVENOUS; SUBCUTANEOUS at 14:10

## 2018-10-10 RX ADMIN — Medication 9 MILLIGRAM(S): at 05:08

## 2018-10-10 RX ADMIN — WARFARIN SODIUM 5 MILLIGRAM(S): 2.5 TABLET ORAL at 21:06

## 2018-10-10 RX ADMIN — CEFEPIME 100 MILLIGRAM(S): 1 INJECTION, POWDER, FOR SOLUTION INTRAMUSCULAR; INTRAVENOUS at 17:30

## 2018-10-10 RX ADMIN — Medication 1 APPLICATION(S): at 11:09

## 2018-10-10 RX ADMIN — Medication 2: at 12:22

## 2018-10-10 RX ADMIN — Medication 2: at 08:16

## 2018-10-10 RX ADMIN — Medication 2: at 17:00

## 2018-10-10 NOTE — PROGRESS NOTE ADULT - SUBJECTIVE AND OBJECTIVE BOX
HOSPITALIST PROGRESS NOTE:  SUBJECTIVE:  PCP:  Chief Complaint: Patient is a 68y old  Female who presents with a chief complaint of LE reddness/pain (02 Oct 2018 18:47)      HPI:  67 y/o female w/ PMH Afib on coumadin, T2D, UC, PVC sent in by vascular surg in [facility] b/c worsening right LE redness/edema.  patient refuses to go to [facility] therefore came back here as she  was hospitalized in 8/2018 for same issue, treated with iv antibiotics. had debridement done 2 weeks ago.  associated with severe pain, yellowish discharge  denies any fever/chills, headache, chest pain, palpitations, nausea/vomiting.   In ED, s/p cefepime/vanco, doppler done-no dvt but limited study on right lower leg.    10/3:  Above reviewed; C/o of LE pain R>L  10/4:  still having pain in her legs. Less redness   10/5:  Improving. still red and swollen  10/6: This morning patient didnt take the lasix; She felt anxious and it felt like she was having a panic attack; Upset about her legs and the fact that she is in the hospital. she is missing her grandsons game.  10/7: states RLE improving but LLE still swollen  10/8: Legs improving   10/9: Improving slowly.  HD stable, Denies fever, chills, N, V, abd pain, CP, SOB.  10/10/18: Patient seen and examined. No new complaints.     Allergies:  penicillin (Hives)    REVIEW OF SYSTEMS:  See HPI. All other review of systems is negative unless indicated above.     Vital Signs Last 24 Hrs  T(C): 36.8 (10 Oct 2018 12:09), Max: 36.9 (09 Oct 2018 17:11)  T(F): 98.2 (10 Oct 2018 12:09), Max: 98.5 (09 Oct 2018 17:11)  HR: 86 (10 Oct 2018 12:09) (82 - 87)  BP: 154/88 (10 Oct 2018 12:09) (145/72 - 158/86)  BP(mean): --  RR: 17 (10 Oct 2018 12:09) (17 - 18)  SpO2: 98% (10 Oct 2018 12:09) (82% - 98%)        Physical Exam:     Constitutional: NAD, awake and alert, well-developed  Neurological: AAO x 3, no focal deficits  HEENT: PERRLA, EOMI, MMM  Neck: Soft and supple, No LAD, No JVD  Respiratory: Breath sounds are clear bilaterally, No wheezing, rales or rhonchi  Cardiovascular: S1 and S2, regular rate and rhythm; no Murmurs, gallops or rubs  Gastrointestinal: Bowel Sounds present, soft, nontender, nondistended, no guarding, no rebound tenderness  Back: No CVA tenderness   Extremities:  +chronic venous stasis changes with reddness bilaterally.  +posterior ulcer with granulation tissue on right lower leg.  no discharge, bleeding or malodor.  +tender to touch  Vascular: 2+ peripheral pulses  Musculoskeletal: 5/5 strength b/l upper and lower extremities  Skin: No rashes  Breast: Deferred   Rectal: Deferred        MEDICATIONS:    aspirin  chewable 81 milliGRAM(s) Oral daily  buDESOnide    EC Capsule 9 milliGRAM(s) Oral daily  cefepime   IVPB 2000 milliGRAM(s) IV Intermittent every 12 hours  collagenase Ointment 1 Application(s) Topical daily  dextrose 5%. 1000 milliLiter(s) (50 mL/Hr) IV Continuous <Continuous>  dextrose 50% Injectable 12.5 Gram(s) IV Push once  dextrose 50% Injectable 25 Gram(s) IV Push once  dextrose 50% Injectable 25 Gram(s) IV Push once  diltiazem    Tablet 120 milliGRAM(s) Oral two times a day  ferrous    sulfate 325 milliGRAM(s) Oral daily  furosemide    Tablet 40 milliGRAM(s) Oral two times a day  insulin lispro (HumaLOG) corrective regimen sliding scale   SubCutaneous three times a day before meals  metoprolol succinate  milliGRAM(s) Oral daily  montelukast 10 milliGRAM(s) Oral daily  potassium chloride    Tablet ER 40 milliEquivalent(s) Oral daily  simvastatin 5 milliGRAM(s) Oral at bedtime  sodium chloride 0.9% lock flush 3 milliLiter(s) IV Push every 8 hours  warfarin 5 milliGRAM(s) Oral daily    MEDICATIONS  (PRN):  dextrose 40% Gel 15 Gram(s) Oral once PRN Blood Glucose LESS THAN 70 milliGRAM(s)/deciliter  glucagon  Injectable 1 milliGRAM(s) IntraMuscular once PRN Glucose LESS THAN 70 milligrams/deciliter  oxyCODONE    5 mG/acetaminophen 325 mG 1 Tablet(s) Oral every 6 hours PRN Moderate Pain (4 - 6)  oxyCODONE    IR 10 milliGRAM(s) Oral every 6 hours PRN Severe Pain (7 - 10)        Labs:           09 Oct 2018 06:01    140    |  105    |  20     ----------------------------<  138    4.1     |  27     |  0.54     Ca    8.4        09 Oct 2018 06:01        PT/INR - ( 10 Oct 2018 07:05 )   PT: 20.2 sec;   INR: 1.85 ratio           CAPILLARY BLOOD GLUCOSE      POCT Blood Glucose.: 169 mg/dL (10 Oct 2018 12:17)  POCT Blood Glucose.: 156 mg/dL (10 Oct 2018 08:00)  POCT Blood Glucose.: 206 mg/dL (09 Oct 2018 22:45)  POCT Blood Glucose.: 145 mg/dL (09 Oct 2018 17:03)                 Assessment and Plan:   · Assessment		    B/l lower extremities chronic stasis dermatitis with superimposed RLE chronic venous ulcer with cellulitis   -Leukocytosis resolved.   -US - No DVT  -cont cefepime, S/P vancomycin  -S/P lasix 40mg IV Q12H Switch to Lasix PO Q12H  -vascular consult appreciated  - will need daily local wound care with collagenase, xeroform, 4x4 kerlix and ace bandage for compression.  outpt follow up Dr. Yoo.   -pain meds as needed   -elevated Legs    Enterobacter and Pseudomonas UTI: Asymptomatic - on antibiotics for above.    paroxysmal afib:  -rate controlled on CCB, BB  -cont coumadin for goal INR 2-3    chronic diastolic CHF, euvolemic  -lasix  PO for LE edema  -low Na diet    NIDDM  -hold home po meds   -bgm and sliding scale    abnormal LFTs on previous admission, resolved   - US abd  - fatty liver, nonspecific lesion right lobe and left lobe not seen on prior ultrasound and  CBD dilated new from 2016   - normal TB;  trend lfts, check lipid panel- normal   - will need close GI follow up and MRI  as ouptatient     dvt px  -on coumadin    Dispo:  -transition to oral antibiotic possibly tomorrow.    -wound care/home care upon d/c   -discharge with outpatient vascular and pcp follow up.

## 2018-10-10 NOTE — PROGRESS NOTE ADULT - SUBJECTIVE AND OBJECTIVE BOX
Date of service: 10-10-18 @ 14:12    Lying in bed in NAD  Legs feel less swollen  Pain is improved    ROS: no fever or chills; denies dizziness, no HA, no SOB or cough, no abdominal pain, no diarrhea or constipation; no dysuria    MEDICATIONS  (STANDING):  aspirin  chewable 81 milliGRAM(s) Oral daily  buDESOnide    EC Capsule 9 milliGRAM(s) Oral daily  cefepime   IVPB 2000 milliGRAM(s) IV Intermittent every 12 hours  collagenase Ointment 1 Application(s) Topical daily  dextrose 5%. 1000 milliLiter(s) (50 mL/Hr) IV Continuous <Continuous>  dextrose 50% Injectable 12.5 Gram(s) IV Push once  dextrose 50% Injectable 25 Gram(s) IV Push once  dextrose 50% Injectable 25 Gram(s) IV Push once  diltiazem    Tablet 120 milliGRAM(s) Oral two times a day  ferrous    sulfate 325 milliGRAM(s) Oral daily  furosemide    Tablet 40 milliGRAM(s) Oral two times a day  insulin lispro (HumaLOG) corrective regimen sliding scale   SubCutaneous three times a day before meals  metoprolol succinate  milliGRAM(s) Oral daily  montelukast 10 milliGRAM(s) Oral daily  potassium chloride    Tablet ER 40 milliEquivalent(s) Oral daily  simvastatin 5 milliGRAM(s) Oral at bedtime  sodium chloride 0.9% lock flush 3 milliLiter(s) IV Push every 8 hours  warfarin 5 milliGRAM(s) Oral daily      Vital Signs Last 24 Hrs  T(C): 36.8 (10 Oct 2018 12:09), Max: 36.9 (09 Oct 2018 17:11)  T(F): 98.2 (10 Oct 2018 12:09), Max: 98.5 (09 Oct 2018 17:11)  HR: 86 (10 Oct 2018 12:09) (82 - 87)  BP: 154/88 (10 Oct 2018 12:09) (145/72 - 158/86)  BP(mean): --  RR: 17 (10 Oct 2018 12:09) (17 - 18)  SpO2: 98% (10 Oct 2018 12:09) (82% - 98%)    Physical Exam:      Constitutional: frail looking  HEENT: NC/AT, EOMI, PERRLA, conjunctivae clear  Neck: supple; thyroid not palpable  Back: no tenderness  Respiratory: respiratory effort normal; clear to auscultation  Cardiovascular: S1S2 regular, no murmurs  Abdomen: soft, not tender, not distended, positive BS  Genitourinary: no suprapubic tenderness  Lymphatic: no LN palpable  Musculoskeletal: no muscle tenderness, no joint swelling or tenderness  Extremities: 2 + pedal edema  RLE calf ulcer with scant discharge; right calf brown discoloration; edema and tenderness are much improved  Left dorsal aspect of foot area of erythema and edema; Left ankle chronic skin changes; no break in skin - edema is improving  Neurological/ Psychiatric: AxOx3, moving all extremities  Skin: no rashes; no palpable lesions    Labs: reviewed    10-09    140  |  105  |  20  ----------------------------<  138<H>  4.1   |  27  |  0.54    Ca    8.4<L>      09 Oct 2018 06:01                        11.9   9.81  )-----------( 331      ( 07 Oct 2018 08:11 )             37.6     10-08    139  |  104  |  16  ----------------------------<  215<H>  4.2   |  27  |  0.60    Ca    8.8      08 Oct 2018 08:44  Phos  3.0     10-07  Mg     1.8     10-07              Vancomycin Level, Trough: 13.7 ug/mL (10-05 @ 05:33)                                  12.6   11.20 )-----------( 359      ( 03 Oct 2018 07:31 )             39.2     10-03    137  |  98  |  16  ----------------------------<  162<H>  3.4<L>   |  31  |  0.52    Ca    8.3<L>      03 Oct 2018 07:31  Phos  2.9     10-03  Mg     1.5     10-03    TPro  6.7  /  Alb  2.7<L>  /  TBili  0.5  /  DBili  x   /  AST  12<L>  /  ALT  26  /  AlkPhos  71  10-03     LIVER FUNCTIONS - ( 03 Oct 2018 07:31 )  Alb: 2.7 g/dL / Pro: 6.7 gm/dL / ALK PHOS: 71 U/L / ALT: 26 U/L / AST: 12 U/L / GGT: x           Urinalysis Basic - ( 02 Oct 2018 14:50 )    Color: Yellow / Appearance: Clear / S.025 / pH: x  Gluc: x / Ketone: Trace  / Bili: Negative / Urobili: Negative mg/dL   Blood: x / Protein: 15 mg/dL / Nitrite: Negative   Leuk Esterase: Small / RBC: 3-5 /HPF / WBC 3-5   Sq Epi: x / Non Sq Epi: Moderate / Bacteria: Occasional      Culture - Urine (collected 02 Oct 2018 14:50)  Source: .Urine None  Preliminary Report (03 Oct 2018 21:00):    50,000 - 99,000 CFU/mL Pseudomonas aeruginosa    Culture - Blood (collected 02 Oct 2018 13:40)  Source: .Blood Blood  Preliminary Report (03 Oct 2018 18:02):    No growth to date.    Culture - Blood (collected 02 Oct 2018 13:40)  Source: .Blood Blood  Preliminary Report (03 Oct 2018 18:02):    No growth to date.        Radiology: all available radiological tests reviewed    Advanced directives addressed: full resuscitation

## 2018-10-10 NOTE — PROGRESS NOTE ADULT - ASSESSMENT
67 y/o female with h/o A.fib on coumadin, T2D, UC, PVC was admitted on 10/2 for left lower extremity redness. She was referred to the hospital by vascular surgeon in [place] for worsening right LE redness/edema. The patient was hospitalized in 8/2018 for similar issue, treated with IV antibiotics. She reports a debridement done 2 weeks PTA. She has severe LLE pain, yellowish discharge. Denies fever or chills at home. In ER she received vancomycin IV ans cefepime.      1. B/l lower extremities chronic stasis dermatitis with superimposed RLE cellulitis. PVD. Allergy to PCN.   -new left foot dorsal aspect erythema and edema are improved s/p ACE wrap  -legs pain is improved  -s/p vancomycin 1.750 gm IV q12h # 6 days  -on cefepime 2 gm IV q12h # 7  -tolerating abx well so far; no side effects noted  -monitor closely in ej of PCN allergy history  -elevate legs  -may change to ceftin 500 mg PO q12h when ready for discharge  -victor manuel management  -monitor temps  -f/u CBC  -supportive care  2. Other issues:   -care per medicine

## 2018-10-10 NOTE — PROGRESS NOTE ADULT - REASON FOR ADMISSION
LE reddness/pain

## 2018-10-10 NOTE — PROGRESS NOTE ADULT - PROVIDER SPECIALTY LIST ADULT
Hospitalist
Infectious Disease
Vascular Surgery
Infectious Disease

## 2018-10-11 ENCOUNTER — TRANSCRIPTION ENCOUNTER (OUTPATIENT)
Age: 68
End: 2018-10-11

## 2018-10-11 VITALS — WEIGHT: 270.29 LBS

## 2018-10-11 LAB
GLUCOSE BLDC GLUCOMTR-MCNC: 138 MG/DL — HIGH (ref 70–99)
GLUCOSE BLDC GLUCOMTR-MCNC: 189 MG/DL — HIGH (ref 70–99)

## 2018-10-11 RX ORDER — CEFUROXIME AXETIL 250 MG
1 TABLET ORAL
Qty: 14 | Refills: 0
Start: 2018-10-11 | End: 2018-10-17

## 2018-10-11 RX ADMIN — Medication 9 MILLIGRAM(S): at 05:32

## 2018-10-11 RX ADMIN — Medication 325 MILLIGRAM(S): at 12:10

## 2018-10-11 RX ADMIN — SODIUM CHLORIDE 3 MILLILITER(S): 9 INJECTION INTRAMUSCULAR; INTRAVENOUS; SUBCUTANEOUS at 05:32

## 2018-10-11 RX ADMIN — Medication 40 MILLIEQUIVALENT(S): at 12:12

## 2018-10-11 RX ADMIN — Medication 40 MILLIGRAM(S): at 06:25

## 2018-10-11 RX ADMIN — Medication 1 APPLICATION(S): at 12:10

## 2018-10-11 RX ADMIN — Medication 81 MILLIGRAM(S): at 12:10

## 2018-10-11 RX ADMIN — Medication 2: at 12:09

## 2018-10-11 RX ADMIN — SODIUM CHLORIDE 3 MILLILITER(S): 9 INJECTION INTRAMUSCULAR; INTRAVENOUS; SUBCUTANEOUS at 13:46

## 2018-10-11 RX ADMIN — CEFEPIME 100 MILLIGRAM(S): 1 INJECTION, POWDER, FOR SOLUTION INTRAMUSCULAR; INTRAVENOUS at 05:32

## 2018-10-11 RX ADMIN — Medication 100 MILLIGRAM(S): at 05:32

## 2018-10-11 NOTE — DISCHARGE NOTE ADULT - HOSPITAL COURSE
69 y/o female w/ PMH Afib on coumadin, T2D, UC, PVC sent in by vascular surg in [facility] b/c worsening right LE redness/edema.  patient refuses to go to [facility] therefore came back here as she  was hospitalized in 8/2018 for same issue, treated with iv antibiotics. had debridement done 2 weeks ago.  associated with severe pain, yellowish discharge  denies any fever/chills, headache, chest pain, palpitations, nausea/vomiting.   In ED, s/p cefepime/vanco, doppler done-no dvt but limited study on right lower leg.    Hospital course: She was started on IV antibiotics: IV vanco and cefepime, slowly improving. She was seen by Dr Hoyos  and also local wound care was done. She was continued  her outpatient medications including coumadin. INR therapeutic. Lasix po continued. Cellulitis slowly improving, much better today. Medically stable for discharge.     10/3:  Above reviewed; C/o of LE pain R>L  10/4:  still having pain in her legs. Less redness   10/5:  Improving. still red and swollen  10/6: This morning patient didnt take the lasix; She felt anxious and it felt like she was having a panic attack; Upset about her legs and the fact that she is in the hospital. she is missing her grandsons game.  10/7: states RLE improving but LLE still swollen  10/8: Legs improving   10/9: Improving slowly.  HD stable, Denies fever, chills, N, V, abd pain, CP, SOB.  10/10/18: Patient seen and examined. No new complaints.     Vital Signs Last 24 Hrs  T(C): 36.2 (11 Oct 2018 11:22), Max: 37.3 (10 Oct 2018 17:27)  T(F): 97.2 (11 Oct 2018 11:22), Max: 99.1 (10 Oct 2018 17:27)  HR: 73 (11 Oct 2018 11:22) (70 - 92)  BP: 124/55 (11 Oct 2018 11:22) (124/55 - 155/63)  BP(mean): --  RR: 17 (11 Oct 2018 11:22) (17 - 18)  SpO2: 99% (11 Oct 2018 11:22) (96% - 99%)      Physical Exam:     Constitutional: NAD, awake and alert, well-developed  Neurological: AAO x 3, no focal deficits  HEENT: PERRLA, EOMI, MMM  Neck: Soft and supple, No LAD, No JVD  Respiratory: Breath sounds are clear bilaterally, No wheezing, rales or rhonchi  Cardiovascular: S1 and S2, regular rate and rhythm; no Murmurs, gallops or rubs  Gastrointestinal: Bowel Sounds present, soft, nontender, nondistended, no guarding, no rebound tenderness  Back: No CVA tenderness   Extremities:  +chronic venous stasis changes with reddness bilaterally.  +posterior ulcer with granulation tissue on right lower leg.  no discharge, bleeding or malodor.  +tender to touch  Vascular: 2+ peripheral pulses  Musculoskeletal: 5/5 strength b/l upper and lower extremities  Skin: No rashes          Assessment and Plan:   · Assessment		    B/l lower extremities chronic stasis dermatitis with superimposed RLE chronic venous ulcer with cellulitis   -Leukocytosis resolved.   -US - No DVT  -On cefepime, S/P vancomycin, chnage to po ceftin  -S/P lasix 40mg IV Q12H Switch to Lasix PO   -vascular consult appreciated  - will need daily local wound care with collagenase, xeroform, 4x4 kerlix and ace bandage for compression.  outpt follow up Dr. Yoo.   -elevated Legs    Enterobacter and Pseudomonas UTI: Asymptomatic - on antibiotics for above.    paroxysmal afib:  -rate controlled on CCB, BB  -cont coumadin for goal INR 2-3    chronic diastolic CHF, euvolemic  -lasix  PO for LE edema  -low Na diet    NIDDM  -bgm and sliding scale    abnormal LFTs on previous admission, resolved   - US abd  - fatty liver, nonspecific lesion right lobe and left lobe not seen on prior ultrasound and  CBD dilated new from 2016   - normal TB;  trend lfts, check lipid panel- normal   - will need close GI follow up and MRI  as ouptatient    Home today  PMD Dr Mayer was notified.  Spent ore than 30 minutes to prepare the discharge.

## 2018-10-11 NOTE — DISCHARGE NOTE ADULT - PLAN OF CARE
prevent further admission Wound care:  wound care with collagenase, xeroform, 4x4 kerlix and ace bandage for compression.  outpt follow up Dr. Yoo.

## 2018-10-11 NOTE — DISCHARGE NOTE ADULT - CARE PROVIDER_API CALL
Justo Mayer (MD), Cardiovascular Disease; Internal Medicine  [address] [place] [address]  Phone: [phone]  Fax: [phone]

## 2018-10-11 NOTE — DISCHARGE NOTE ADULT - PATIENT PORTAL LINK FT
You can access the [facility] Patient Portal, offered by [facility], by registering with the following website: [email or web address]

## 2018-10-11 NOTE — DISCHARGE NOTE ADULT - MEDICATION SUMMARY - MEDICATIONS TO STOP TAKING
I will STOP taking the medications listed below when I get home from the hospital:    furosemide 40 mg oral tablet  -- 1 by mouth once a day    furosemide 40 mg oral tablet  -- 2 tab(s) by mouth once a day, As Needed - for edema of the legs    potassium chloride 20 mEq oral tablet, extended release  -- 1 tab(s) by mouth once a day    potassium chloride 10 mEq oral capsule, extended release  -- 1 cap(s) by mouth once a day, As Needed        ***for cramping of the legs*****    warfarin 7.5 mg oral tablet  -- 1 tab(s) by mouth 4 times a week    warfarin 5 mg oral tablet  -- 1 tab(s) by mouth 3 times a week    gabapentin 300 mg oral capsule  -- 1 cap(s) by mouth 3 times a day    oxyCODONE 10 mg oral tablet  -- 1 tab(s) by mouth every 6 hours, As needed, Moderate Pain (4 - 6) MDD:4 tablets    cefuroxime 500 mg oral tablet  -- 1 tab(s) by mouth every 12 hours   -- Finish all this medication unless otherwise directed by prescriber.  Medication should be taken with plenty of water.  Take with food or milk.

## 2018-10-11 NOTE — DISCHARGE NOTE ADULT - MEDICATION SUMMARY - MEDICATIONS TO TAKE
I will START or STAY ON the medications listed below when I get home from the hospital:    Uceris 9 mg oral tablet, extended release  -- 1 tab(s) by mouth once a day (in the morning)  -- Indication: For home med    aspirin 81 mg oral tablet  -- 1 tab(s) by mouth once a day  -- Indication: For home med    dilTIAZem 120 mg oral tablet  -- 1 tab(s) by mouth 2 times a day  -- Indication: For A Fib    warfarin 5 mg oral tablet  -- 1 tab(s) by mouth once a day  -- Indication: For A Fib    metFORMIN 1000 mg oral tablet  -- 1 tab(s) by mouth 2 times a day  -- Indication: For DM    pravastatin 10 mg oral tablet  -- 1 tab(s) by mouth once a day  -- Indication: For HLD    zolpidem 10 mg oral tablet  -- 1 tab(s) by mouth once a day (at bedtime), As Needed  -- Indication: For sleep    Metoprolol Succinate  mg oral tablet, extended release  -- 1 tab(s) by mouth once a day  -- Indication: For A Fib    cefuroxime 500 mg oral tablet  -- 1 tab(s) by mouth every 12 hours  -- Finish all this medication unless otherwise directed by prescriber.  Medication should be taken with plenty of water.  Take with food or milk.    -- Indication: For Cellulitis    furosemide 80 mg oral tablet  -- 1 tab(s) by mouth once a day  -- Indication: For leg edema    ferrous sulfate 250 mg oral capsule, extended release  -- 1 cap(s) by mouth once a day  -- Indication: For supplement    montelukast 10 mg oral tablet  -- 1 tab(s) by mouth once a day (at bedtime)  -- Indication: For asthma    potassium chloride 20 mEq oral tablet, extended release  -- 1 tab(s) by mouth 2 times a day  -- Indication: For supplement    Flonase 50 mcg/inh nasal spray  -- 1 spray(s) in each nostril once a day, As Needed  -- Indication: For nasal spray

## 2018-10-15 ENCOUNTER — APPOINTMENT (OUTPATIENT)
Dept: VASCULAR SURGERY | Facility: CLINIC | Age: 68
End: 2018-10-15
Payer: MEDICARE

## 2018-10-15 VITALS
DIASTOLIC BLOOD PRESSURE: 84 MMHG | WEIGHT: 270 LBS | TEMPERATURE: 98.2 F | BODY MASS INDEX: 38.65 KG/M2 | SYSTOLIC BLOOD PRESSURE: 148 MMHG | HEIGHT: 70 IN | HEART RATE: 65 BPM

## 2018-10-15 PROCEDURE — 29580 STRAPPING UNNA BOOT: CPT | Mod: 50

## 2018-10-15 PROCEDURE — 99214 OFFICE O/P EST MOD 30 MIN: CPT | Mod: 25

## 2018-10-15 PROCEDURE — 93970 EXTREMITY STUDY: CPT

## 2018-10-17 NOTE — CDI QUERY NOTE - NSCDIOTHERTXTBX_GEN_ALL_CORE_HH
HPI Objective Statement: 67 y/o F with a PMHx of AFib on Coumadin and 81mg ASA presents to the ED c/o severe pain in right lower cellulitic leg. She has been experiencing RLE cellulitis for ~1 month. associated with severe pain, yellowish discharge Pt describes pain as severe and constant since debridement 2 weeks ago at Dr. Rivera's office in [place]. Pt was admitted to  on 8/13/18 for 2 weeks and was treated with IV abx. Pt was seen by PA at Dr. Rivera's office today and was sent here for admission for failed outpatient therapy and eval by Vascular Dr. Garcia      Please document the relationship between the following conditions:  For example:  Condition A - Cellulitis  Condition B : Recent hx of debridement  ( surgical )        A) Condition A ( Cellulitis ) is related to  condition B ,  recent  hx debridement       B) Condition A ( Cellulitis ) UNRELATED TO Condition B  ( recent hx of  debridement  )     C )  Other ,   please specify      D)  Clinically insignificant

## 2018-10-22 ENCOUNTER — APPOINTMENT (OUTPATIENT)
Dept: VASCULAR SURGERY | Facility: CLINIC | Age: 68
End: 2018-10-22
Payer: MEDICARE

## 2018-10-22 VITALS
DIASTOLIC BLOOD PRESSURE: 88 MMHG | SYSTOLIC BLOOD PRESSURE: 168 MMHG | HEART RATE: 79 BPM | BODY MASS INDEX: 38.65 KG/M2 | TEMPERATURE: 98.5 F | WEIGHT: 270 LBS | HEIGHT: 70 IN

## 2018-10-22 DIAGNOSIS — Z82.49 FAMILY HISTORY OF ISCHEMIC HEART DISEASE AND OTHER DISEASES OF THE CIRCULATORY SYSTEM: ICD-10-CM

## 2018-10-22 DIAGNOSIS — Z83.3 FAMILY HISTORY OF DIABETES MELLITUS: ICD-10-CM

## 2018-10-22 DIAGNOSIS — Z86.79 PERSONAL HISTORY OF OTHER DISEASES OF THE CIRCULATORY SYSTEM: ICD-10-CM

## 2018-10-22 DIAGNOSIS — Z82.5 FAMILY HISTORY OF ASTHMA AND OTHER CHRONIC LOWER RESPIRATORY DISEASES: ICD-10-CM

## 2018-10-22 DIAGNOSIS — Z72.3 LACK OF PHYSICAL EXERCISE: ICD-10-CM

## 2018-10-22 DIAGNOSIS — Z80.9 FAMILY HISTORY OF MALIGNANT NEOPLASM, UNSPECIFIED: ICD-10-CM

## 2018-10-22 PROCEDURE — 29580 STRAPPING UNNA BOOT: CPT | Mod: 50

## 2018-10-27 PROBLEM — Z86.79 HISTORY OF PERIPHERAL VASCULAR DISEASE: Status: RESOLVED | Noted: 2018-10-15 | Resolved: 2018-10-27

## 2018-10-27 PROBLEM — Z86.79 HISTORY OF VASCULAR DISORDER: Status: RESOLVED | Noted: 2018-10-15 | Resolved: 2018-10-27

## 2018-10-27 PROBLEM — Z82.5 FAMILY HISTORY OF ASTHMA: Status: ACTIVE | Noted: 2018-10-15

## 2018-10-27 PROBLEM — Z82.5 FAMILY HISTORY OF CHRONIC OBSTRUCTIVE PULMONARY DISEASE: Status: ACTIVE | Noted: 2018-10-15

## 2018-10-27 PROBLEM — Z82.49 FAMILY HISTORY OF CARDIAC DISORDER: Status: ACTIVE | Noted: 2018-10-15

## 2018-10-27 PROBLEM — Z80.9 FAMILY HISTORY OF MALIGNANT NEOPLASM: Status: ACTIVE | Noted: 2018-10-15

## 2018-10-27 PROBLEM — Z83.3 FAMILY HISTORY OF DIABETES MELLITUS: Status: ACTIVE | Noted: 2018-10-15

## 2018-10-27 PROBLEM — Z72.3 DOES NOT EXERCISE: Status: ACTIVE | Noted: 2018-10-15

## 2018-10-27 PROBLEM — Z82.49 FAMILY HISTORY OF HYPERTENSION: Status: ACTIVE | Noted: 2018-10-15

## 2018-10-29 ENCOUNTER — APPOINTMENT (OUTPATIENT)
Dept: VASCULAR SURGERY | Facility: CLINIC | Age: 68
End: 2018-10-29
Payer: MEDICARE

## 2018-10-29 VITALS
DIASTOLIC BLOOD PRESSURE: 85 MMHG | HEIGHT: 70 IN | TEMPERATURE: 97.8 F | WEIGHT: 277 LBS | OXYGEN SATURATION: 96 % | SYSTOLIC BLOOD PRESSURE: 168 MMHG | BODY MASS INDEX: 39.65 KG/M2 | HEART RATE: 93 BPM

## 2018-10-29 PROCEDURE — 29580 STRAPPING UNNA BOOT: CPT | Mod: 50

## 2018-10-31 ENCOUNTER — APPOINTMENT (OUTPATIENT)
Dept: VASCULAR SURGERY | Facility: CLINIC | Age: 68
End: 2018-10-31
Payer: MEDICARE

## 2018-10-31 PROCEDURE — 29580 STRAPPING UNNA BOOT: CPT | Mod: RT

## 2018-10-31 RX ORDER — FUROSEMIDE 40 MG/1
40 TABLET ORAL
Refills: 0 | Status: COMPLETED | COMMUNITY
End: 2018-10-31

## 2018-10-31 RX ORDER — GABAPENTIN 300 MG/1
300 CAPSULE ORAL
Qty: 90 | Refills: 0 | Status: COMPLETED | COMMUNITY
Start: 2018-08-22 | End: 2018-10-31

## 2018-10-31 RX ORDER — SULFAMETHOXAZOLE AND TRIMETHOPRIM 800; 160 MG/1; MG/1
800-160 TABLET ORAL
Qty: 20 | Refills: 0 | Status: COMPLETED | COMMUNITY
Start: 2018-08-30 | End: 2018-10-31

## 2018-10-31 RX ORDER — ACETAMINOPHEN AND CODEINE 300; 30 MG/1; MG/1
300-30 TABLET ORAL
Qty: 20 | Refills: 0 | Status: COMPLETED | COMMUNITY
Start: 2018-09-27 | End: 2018-10-31

## 2018-10-31 RX ORDER — TRAMADOL HYDROCHLORIDE 50 MG/1
50 TABLET, COATED ORAL
Qty: 30 | Refills: 0 | Status: COMPLETED | COMMUNITY
Start: 2018-09-24 | End: 2018-10-31

## 2018-10-31 RX ORDER — OXYCODONE 10 MG/1
10 TABLET ORAL
Refills: 0 | Status: COMPLETED | COMMUNITY
End: 2018-10-31

## 2018-10-31 RX ORDER — CEPHALEXIN 500 MG/1
500 CAPSULE ORAL
Qty: 28 | Refills: 0 | Status: COMPLETED | COMMUNITY
Start: 2018-08-11 | End: 2018-10-31

## 2018-10-31 RX ORDER — CEFUROXIME AXETIL 500 MG/1
500 TABLET ORAL
Qty: 28 | Refills: 0 | Status: COMPLETED | COMMUNITY
Start: 2018-08-22 | End: 2018-10-31

## 2018-11-02 ENCOUNTER — APPOINTMENT (OUTPATIENT)
Dept: VASCULAR SURGERY | Facility: CLINIC | Age: 68
End: 2018-11-02

## 2018-11-05 ENCOUNTER — APPOINTMENT (OUTPATIENT)
Dept: VASCULAR SURGERY | Facility: CLINIC | Age: 68
End: 2018-11-05
Payer: MEDICARE

## 2018-11-05 VITALS
HEART RATE: 60 BPM | TEMPERATURE: 98.1 F | OXYGEN SATURATION: 100 % | WEIGHT: 270 LBS | BODY MASS INDEX: 38.65 KG/M2 | SYSTOLIC BLOOD PRESSURE: 143 MMHG | HEIGHT: 70 IN | DIASTOLIC BLOOD PRESSURE: 80 MMHG

## 2018-11-05 PROCEDURE — 99213 OFFICE O/P EST LOW 20 MIN: CPT

## 2018-11-12 ENCOUNTER — APPOINTMENT (OUTPATIENT)
Dept: VASCULAR SURGERY | Facility: CLINIC | Age: 68
End: 2018-11-12
Payer: MEDICARE

## 2018-11-12 PROCEDURE — 99213 OFFICE O/P EST LOW 20 MIN: CPT

## 2018-11-13 ENCOUNTER — INPATIENT (INPATIENT)
Facility: HOSPITAL | Age: 68
LOS: 6 days | Discharge: HOME HEALTH | End: 2018-11-20
Attending: FAMILY MEDICINE | Admitting: FAMILY MEDICINE
Payer: COMMERCIAL

## 2018-11-13 VITALS — WEIGHT: 250 LBS | HEIGHT: 70 IN

## 2018-11-13 DIAGNOSIS — Z29.9 ENCOUNTER FOR PROPHYLACTIC MEASURES, UNSPECIFIED: ICD-10-CM

## 2018-11-13 DIAGNOSIS — Z98.890 OTHER SPECIFIED POSTPROCEDURAL STATES: Chronic | ICD-10-CM

## 2018-11-13 DIAGNOSIS — K95.09 OTHER COMPLICATIONS OF GASTRIC BAND PROCEDURE: Chronic | ICD-10-CM

## 2018-11-13 DIAGNOSIS — L03.115 CELLULITIS OF RIGHT LOWER LIMB: ICD-10-CM

## 2018-11-13 DIAGNOSIS — E11.69 TYPE 2 DIABETES MELLITUS WITH OTHER SPECIFIED COMPLICATION: ICD-10-CM

## 2018-11-13 DIAGNOSIS — I87.2 VENOUS INSUFFICIENCY (CHRONIC) (PERIPHERAL): ICD-10-CM

## 2018-11-13 DIAGNOSIS — I48.91 UNSPECIFIED ATRIAL FIBRILLATION: ICD-10-CM

## 2018-11-13 DIAGNOSIS — E11.9 TYPE 2 DIABETES MELLITUS WITHOUT COMPLICATIONS: ICD-10-CM

## 2018-11-13 DIAGNOSIS — A41.9 SEPSIS, UNSPECIFIED ORGANISM: ICD-10-CM

## 2018-11-13 DIAGNOSIS — I50.9 HEART FAILURE, UNSPECIFIED: ICD-10-CM

## 2018-11-13 LAB
ADD ON TEST-SPECIMEN IN LAB: SIGNIFICANT CHANGE UP
ADD ON TEST-SPECIMEN IN LAB: SIGNIFICANT CHANGE UP
ALBUMIN SERPL ELPH-MCNC: 2.7 G/DL — LOW (ref 3.3–5)
ALP SERPL-CCNC: 71 U/L — SIGNIFICANT CHANGE UP (ref 40–120)
ALT FLD-CCNC: 46 U/L — SIGNIFICANT CHANGE UP (ref 12–78)
ANION GAP SERPL CALC-SCNC: 9 MMOL/L — SIGNIFICANT CHANGE UP (ref 5–17)
ANISOCYTOSIS BLD QL: SLIGHT — SIGNIFICANT CHANGE UP
APPEARANCE UR: CLEAR — SIGNIFICANT CHANGE UP
APTT BLD: 39.1 SEC — HIGH (ref 27.5–36.3)
AST SERPL-CCNC: 44 U/L — HIGH (ref 15–37)
BACTERIA # UR AUTO: ABNORMAL
BASOPHILS # BLD AUTO: 0 K/UL — SIGNIFICANT CHANGE UP (ref 0–0.2)
BASOPHILS NFR BLD AUTO: 0 % — SIGNIFICANT CHANGE UP (ref 0–2)
BILIRUB SERPL-MCNC: 0.7 MG/DL — SIGNIFICANT CHANGE UP (ref 0.2–1.2)
BILIRUB UR-MCNC: NEGATIVE — SIGNIFICANT CHANGE UP
BUN SERPL-MCNC: 12 MG/DL — SIGNIFICANT CHANGE UP (ref 7–23)
CALCIUM SERPL-MCNC: 8.6 MG/DL — SIGNIFICANT CHANGE UP (ref 8.5–10.1)
CHLORIDE SERPL-SCNC: 103 MMOL/L — SIGNIFICANT CHANGE UP (ref 96–108)
CK SERPL-CCNC: 44 U/L — SIGNIFICANT CHANGE UP (ref 26–192)
CO2 SERPL-SCNC: 25 MMOL/L — SIGNIFICANT CHANGE UP (ref 22–31)
COLOR SPEC: YELLOW — SIGNIFICANT CHANGE UP
CREAT SERPL-MCNC: 0.76 MG/DL — SIGNIFICANT CHANGE UP (ref 0.5–1.3)
DIFF PNL FLD: ABNORMAL
EOSINOPHIL # BLD AUTO: 0 K/UL — SIGNIFICANT CHANGE UP (ref 0–0.5)
EOSINOPHIL NFR BLD AUTO: 0 % — SIGNIFICANT CHANGE UP (ref 0–6)
EPI CELLS # UR: ABNORMAL
GLUCOSE SERPL-MCNC: 157 MG/DL — HIGH (ref 70–99)
GLUCOSE UR QL: NEGATIVE MG/DL — SIGNIFICANT CHANGE UP
HCT VFR BLD CALC: 41 % — SIGNIFICANT CHANGE UP (ref 34.5–45)
HGB BLD-MCNC: 13.3 G/DL — SIGNIFICANT CHANGE UP (ref 11.5–15.5)
INR BLD: 2.61 RATIO — HIGH (ref 0.88–1.16)
KETONES UR-MCNC: NEGATIVE — SIGNIFICANT CHANGE UP
LACTATE SERPL-SCNC: 2.9 MMOL/L — HIGH (ref 0.7–2)
LACTATE SERPL-SCNC: 3 MMOL/L — HIGH (ref 0.7–2)
LEUKOCYTE ESTERASE UR-ACNC: ABNORMAL
LYMPHOCYTES # BLD AUTO: 2.15 K/UL — SIGNIFICANT CHANGE UP (ref 1–3.3)
LYMPHOCYTES # BLD AUTO: 9 % — LOW (ref 13–44)
MACROCYTES BLD QL: SLIGHT — SIGNIFICANT CHANGE UP
MANUAL SMEAR VERIFICATION: SIGNIFICANT CHANGE UP
MCHC RBC-ENTMCNC: 30.2 PG — SIGNIFICANT CHANGE UP (ref 27–34)
MCHC RBC-ENTMCNC: 32.4 GM/DL — SIGNIFICANT CHANGE UP (ref 32–36)
MCV RBC AUTO: 93.2 FL — SIGNIFICANT CHANGE UP (ref 80–100)
MONOCYTES # BLD AUTO: 1.19 K/UL — HIGH (ref 0–0.9)
MONOCYTES NFR BLD AUTO: 5 % — SIGNIFICANT CHANGE UP (ref 2–14)
NEUTROPHILS # BLD AUTO: 20.54 K/UL — HIGH (ref 1.8–7.4)
NEUTROPHILS NFR BLD AUTO: 80 % — HIGH (ref 43–77)
NEUTS BAND # BLD: 6 % — SIGNIFICANT CHANGE UP (ref 0–8)
NITRITE UR-MCNC: POSITIVE
NRBC # BLD: 0 /100 — SIGNIFICANT CHANGE UP (ref 0–0)
NRBC # BLD: SIGNIFICANT CHANGE UP /100 WBCS (ref 0–0)
NT-PROBNP SERPL-SCNC: 1965 PG/ML — HIGH (ref 0–125)
NT-PROBNP SERPL-SCNC: 2002 PG/ML — HIGH (ref 0–125)
PH UR: 5 — SIGNIFICANT CHANGE UP (ref 5–8)
PLAT MORPH BLD: NORMAL — SIGNIFICANT CHANGE UP
PLATELET # BLD AUTO: 373 K/UL — SIGNIFICANT CHANGE UP (ref 150–400)
POIKILOCYTOSIS BLD QL AUTO: SLIGHT — SIGNIFICANT CHANGE UP
POTASSIUM SERPL-MCNC: 3.5 MMOL/L — SIGNIFICANT CHANGE UP (ref 3.5–5.3)
POTASSIUM SERPL-SCNC: 3.5 MMOL/L — SIGNIFICANT CHANGE UP (ref 3.5–5.3)
PROT SERPL-MCNC: 7.2 GM/DL — SIGNIFICANT CHANGE UP (ref 6–8.3)
PROT UR-MCNC: 15 MG/DL
PROTHROM AB SERPL-ACNC: 29.8 SEC — HIGH (ref 10–12.9)
RBC # BLD: 4.4 M/UL — SIGNIFICANT CHANGE UP (ref 3.8–5.2)
RBC # FLD: 13.7 % — SIGNIFICANT CHANGE UP (ref 10.3–14.5)
RBC BLD AUTO: ABNORMAL
RBC CASTS # UR COMP ASSIST: ABNORMAL /HPF (ref 0–4)
SODIUM SERPL-SCNC: 137 MMOL/L — SIGNIFICANT CHANGE UP (ref 135–145)
SP GR SPEC: 1 — LOW (ref 1.01–1.02)
TROPONIN I SERPL-MCNC: <0.015 NG/ML — SIGNIFICANT CHANGE UP (ref 0.01–0.04)
UROBILINOGEN FLD QL: NEGATIVE MG/DL — SIGNIFICANT CHANGE UP
WBC # BLD: 23.88 K/UL — HIGH (ref 3.8–10.5)
WBC # FLD AUTO: 23.88 K/UL — HIGH (ref 3.8–10.5)
WBC UR QL: ABNORMAL

## 2018-11-13 PROCEDURE — 93010 ELECTROCARDIOGRAM REPORT: CPT

## 2018-11-13 PROCEDURE — 99285 EMERGENCY DEPT VISIT HI MDM: CPT

## 2018-11-13 PROCEDURE — 73620 X-RAY EXAM OF FOOT: CPT | Mod: 26,RT

## 2018-11-13 PROCEDURE — 73701 CT LOWER EXTREMITY W/DYE: CPT | Mod: 26,RT

## 2018-11-13 PROCEDURE — 71045 X-RAY EXAM CHEST 1 VIEW: CPT | Mod: 26

## 2018-11-13 PROCEDURE — 74177 CT ABD & PELVIS W/CONTRAST: CPT | Mod: 26

## 2018-11-13 PROCEDURE — 73600 X-RAY EXAM OF ANKLE: CPT | Mod: 26,RT

## 2018-11-13 PROCEDURE — 73590 X-RAY EXAM OF LOWER LEG: CPT | Mod: 26,RT

## 2018-11-13 RX ORDER — MORPHINE SULFATE 50 MG/1
4 CAPSULE, EXTENDED RELEASE ORAL ONCE
Qty: 0 | Refills: 0 | Status: DISCONTINUED | OUTPATIENT
Start: 2018-11-13 | End: 2018-11-13

## 2018-11-13 RX ORDER — WARFARIN SODIUM 2.5 MG/1
5 TABLET ORAL ONCE
Qty: 0 | Refills: 0 | Status: COMPLETED | OUTPATIENT
Start: 2018-11-13 | End: 2018-11-14

## 2018-11-13 RX ORDER — DEXTROSE 50 % IN WATER 50 %
12.5 SYRINGE (ML) INTRAVENOUS ONCE
Qty: 0 | Refills: 0 | Status: DISCONTINUED | OUTPATIENT
Start: 2018-11-13 | End: 2018-11-20

## 2018-11-13 RX ORDER — SODIUM CHLORIDE 9 MG/ML
2500 INJECTION INTRAMUSCULAR; INTRAVENOUS; SUBCUTANEOUS ONCE
Qty: 0 | Refills: 0 | Status: COMPLETED | OUTPATIENT
Start: 2018-11-13 | End: 2018-11-13

## 2018-11-13 RX ORDER — SODIUM CHLORIDE 9 MG/ML
1000 INJECTION INTRAMUSCULAR; INTRAVENOUS; SUBCUTANEOUS ONCE
Qty: 0 | Refills: 0 | Status: COMPLETED | OUTPATIENT
Start: 2018-11-13 | End: 2018-11-13

## 2018-11-13 RX ORDER — MORPHINE SULFATE 50 MG/1
4 CAPSULE, EXTENDED RELEASE ORAL ONCE
Qty: 0 | Refills: 0 | Status: DISCONTINUED | OUTPATIENT
Start: 2018-11-13 | End: 2018-11-16

## 2018-11-13 RX ORDER — FERROUS SULFATE 325(65) MG
325 TABLET ORAL DAILY
Qty: 0 | Refills: 0 | Status: DISCONTINUED | OUTPATIENT
Start: 2018-11-13 | End: 2018-11-20

## 2018-11-13 RX ORDER — DEXTROSE 50 % IN WATER 50 %
15 SYRINGE (ML) INTRAVENOUS ONCE
Qty: 0 | Refills: 0 | Status: DISCONTINUED | OUTPATIENT
Start: 2018-11-13 | End: 2018-11-20

## 2018-11-13 RX ORDER — DEXTROSE 50 % IN WATER 50 %
25 SYRINGE (ML) INTRAVENOUS ONCE
Qty: 0 | Refills: 0 | Status: DISCONTINUED | OUTPATIENT
Start: 2018-11-13 | End: 2018-11-20

## 2018-11-13 RX ORDER — MONTELUKAST 4 MG/1
10 TABLET, CHEWABLE ORAL AT BEDTIME
Qty: 0 | Refills: 0 | Status: DISCONTINUED | OUTPATIENT
Start: 2018-11-13 | End: 2018-11-20

## 2018-11-13 RX ORDER — SODIUM CHLORIDE 9 MG/ML
1000 INJECTION, SOLUTION INTRAVENOUS
Qty: 0 | Refills: 0 | Status: DISCONTINUED | OUTPATIENT
Start: 2018-11-13 | End: 2018-11-20

## 2018-11-13 RX ORDER — ACETAMINOPHEN 500 MG
650 TABLET ORAL ONCE
Qty: 0 | Refills: 0 | Status: COMPLETED | OUTPATIENT
Start: 2018-11-13 | End: 2018-11-13

## 2018-11-13 RX ORDER — INSULIN LISPRO 100/ML
VIAL (ML) SUBCUTANEOUS AT BEDTIME
Qty: 0 | Refills: 0 | Status: DISCONTINUED | OUTPATIENT
Start: 2018-11-13 | End: 2018-11-20

## 2018-11-13 RX ORDER — MORPHINE SULFATE 50 MG/1
1 CAPSULE, EXTENDED RELEASE ORAL EVERY 4 HOURS
Qty: 0 | Refills: 0 | Status: DISCONTINUED | OUTPATIENT
Start: 2018-11-13 | End: 2018-11-20

## 2018-11-13 RX ORDER — ZOLPIDEM TARTRATE 10 MG/1
5 TABLET ORAL AT BEDTIME
Qty: 0 | Refills: 0 | Status: DISCONTINUED | OUTPATIENT
Start: 2018-11-13 | End: 2018-11-20

## 2018-11-13 RX ORDER — ASPIRIN/CALCIUM CARB/MAGNESIUM 324 MG
81 TABLET ORAL DAILY
Qty: 0 | Refills: 0 | Status: DISCONTINUED | OUTPATIENT
Start: 2018-11-13 | End: 2018-11-20

## 2018-11-13 RX ORDER — POTASSIUM CHLORIDE 20 MEQ
20 PACKET (EA) ORAL
Qty: 0 | Refills: 0 | Status: DISCONTINUED | OUTPATIENT
Start: 2018-11-13 | End: 2018-11-14

## 2018-11-13 RX ORDER — METOPROLOL TARTRATE 50 MG
100 TABLET ORAL DAILY
Qty: 0 | Refills: 0 | Status: DISCONTINUED | OUTPATIENT
Start: 2018-11-13 | End: 2018-11-20

## 2018-11-13 RX ORDER — IBUPROFEN 200 MG
600 TABLET ORAL ONCE
Qty: 0 | Refills: 0 | Status: COMPLETED | OUTPATIENT
Start: 2018-11-13 | End: 2018-11-13

## 2018-11-13 RX ORDER — ATORVASTATIN CALCIUM 80 MG/1
10 TABLET, FILM COATED ORAL AT BEDTIME
Qty: 0 | Refills: 0 | Status: DISCONTINUED | OUTPATIENT
Start: 2018-11-13 | End: 2018-11-20

## 2018-11-13 RX ORDER — INSULIN LISPRO 100/ML
VIAL (ML) SUBCUTANEOUS
Qty: 0 | Refills: 0 | Status: DISCONTINUED | OUTPATIENT
Start: 2018-11-13 | End: 2018-11-20

## 2018-11-13 RX ORDER — ACETAMINOPHEN 500 MG
325 TABLET ORAL ONCE
Qty: 0 | Refills: 0 | Status: COMPLETED | OUTPATIENT
Start: 2018-11-13 | End: 2018-11-13

## 2018-11-13 RX ORDER — VANCOMYCIN HCL 1 G
1750 VIAL (EA) INTRAVENOUS ONCE
Qty: 0 | Refills: 0 | Status: COMPLETED | OUTPATIENT
Start: 2018-11-13 | End: 2018-11-13

## 2018-11-13 RX ORDER — GABAPENTIN 400 MG/1
300 CAPSULE ORAL AT BEDTIME
Qty: 0 | Refills: 0 | Status: DISCONTINUED | OUTPATIENT
Start: 2018-11-13 | End: 2018-11-16

## 2018-11-13 RX ORDER — AZTREONAM 2 G
2000 VIAL (EA) INJECTION ONCE
Qty: 0 | Refills: 0 | Status: COMPLETED | OUTPATIENT
Start: 2018-11-13 | End: 2018-11-13

## 2018-11-13 RX ORDER — FUROSEMIDE 40 MG
40 TABLET ORAL
Qty: 0 | Refills: 0 | Status: DISCONTINUED | OUTPATIENT
Start: 2018-11-13 | End: 2018-11-13

## 2018-11-13 RX ORDER — GLUCAGON INJECTION, SOLUTION 0.5 MG/.1ML
1 INJECTION, SOLUTION SUBCUTANEOUS ONCE
Qty: 0 | Refills: 0 | Status: DISCONTINUED | OUTPATIENT
Start: 2018-11-13 | End: 2018-11-20

## 2018-11-13 RX ADMIN — MORPHINE SULFATE 4 MILLIGRAM(S): 50 CAPSULE, EXTENDED RELEASE ORAL at 16:45

## 2018-11-13 RX ADMIN — Medication 100 MILLIGRAM(S): at 16:50

## 2018-11-13 RX ADMIN — Medication 500 MILLIGRAM(S): at 17:20

## 2018-11-13 RX ADMIN — Medication 1750 MILLIGRAM(S): at 17:54

## 2018-11-13 RX ADMIN — SODIUM CHLORIDE 2500 MILLILITER(S): 9 INJECTION INTRAMUSCULAR; INTRAVENOUS; SUBCUTANEOUS at 16:50

## 2018-11-13 RX ADMIN — Medication 2000 MILLIGRAM(S): at 17:15

## 2018-11-13 RX ADMIN — Medication 325 MILLIGRAM(S): at 16:30

## 2018-11-13 RX ADMIN — SODIUM CHLORIDE 1000 MILLILITER(S): 9 INJECTION INTRAMUSCULAR; INTRAVENOUS; SUBCUTANEOUS at 22:20

## 2018-11-13 RX ADMIN — Medication 650 MILLIGRAM(S): at 16:00

## 2018-11-13 RX ADMIN — SODIUM CHLORIDE 2500 MILLILITER(S): 9 INJECTION INTRAMUSCULAR; INTRAVENOUS; SUBCUTANEOUS at 15:50

## 2018-11-13 RX ADMIN — Medication 650 MILLIGRAM(S): at 16:30

## 2018-11-13 RX ADMIN — Medication 325 MILLIGRAM(S): at 16:00

## 2018-11-13 RX ADMIN — MORPHINE SULFATE 4 MILLIGRAM(S): 50 CAPSULE, EXTENDED RELEASE ORAL at 17:00

## 2018-11-13 NOTE — ED ADULT NURSE NOTE - NSIMPLEMENTINTERV_GEN_ALL_ED
Implemented All Fall Risk Interventions:  [place] to call system. Call bell, personal items and telephone within reach. Instruct patient to call for assistance. Room bathroom lighting operational. Non-slip footwear when patient is off stretcher. Physically safe environment: no spills, clutter or unnecessary equipment. Stretcher in lowest position, wheels locked, appropriate side rails in place. Provide visual cue, wrist band, yellow gown, etc. Monitor gait and stability. Monitor for mental status changes and reorient to person, place, and time. Review medications for side effects contributing to fall risk. Reinforce activity limits and safety measures with patient and family.

## 2018-11-13 NOTE — H&P ADULT - ATTENDING COMMENTS
69 y/o F PMHx as noted above admitted w/ sepsis due to right leg cellulitis.    1)Sepsis due to Recurrent Right Leg Cellulitis  ~cont. IV hydration  ~cont. broad spectrum IV abx  ~f/u ID consultation in the am  ~f/u am labs  ~f/u PAN C+S  ~cont. anti-pyretics  ~f/u w/ Vascular surgery    2)CHF - compensated  ~daily weights  ~strict I/Os  ~cot. home meds as noted above       3)AFib  ~cont. VKA therapy as above  ~cont. rate control    4)Diabetes Mellitus Type 2  ~FS qAC  ~cont. ISS per protocol    5)Hyperlipidemia  ~cont. statin therapy w/ Atorvastatin 10mg PO qHS    6)Ulcerative colitis.  Plan: Provider to RN, take Home Uceris 9mg; non-formulary.   ~cont. management as noted above     7)Vte ppx  ~cont. VKA therapy

## 2018-11-13 NOTE — H&P ADULT - PROBLEM SELECTOR PLAN 5
- Cont. Metoprolol Succinate 100mg PO qD  - Cont. Diltiazem 120mg PO BID  - Warfarin 5mg x1 tonight  - Trend PT/INR

## 2018-11-13 NOTE — H&P ADULT - PROBLEM SELECTOR PLAN 2
- Aztreonam 2g IV q8h  - Vancomycin 1750mg IV q12h  - ID Consult  - Vascular consult  - IV NS 75cc/hr [id number]  - Lactate in AM  - CBC in AM  - Monitor vitals to follow resolution of infection  - f/u Blood cx  - Tylenol 650mg PO q6h PRN - Cefepime 2g IV q12h  - Vancomycin 1750mg IV q12h  - ID Consult  - Vascular consult  - IV NS 75cc/hr [id number]  - Lactate in AM  - CBC in AM  - Monitor vitals to follow resolution of infection  - f/u Blood cx  - Tylenol 650mg PO q6h PRN

## 2018-11-13 NOTE — ED PROVIDER NOTE - SKIN, MLM
+right leg erythematous from the knee down, warm, swollen, small wound to the dorsum of the right foot. Stitches to the lateral aspect. Large stage 3 ulcer with purulent drainage to the posterior aspect of the right leg. Good DP and tibial pulses. Great cap refill.

## 2018-11-13 NOTE — ED ADULT TRIAGE NOTE - CHIEF COMPLAINT QUOTE
c/o fever Tmax 101.5 at home, vomiting, abdominal pain, c/o pain to bilateral lower extremities, dressing to lower extremities, pt states she had bilateral leg dressing completed by doctor yesterday, pt took percocet this morning with no relief in pain  Temp in triage 99.5,  O2 sat 96% Hx: DM, afib, HTN

## 2018-11-13 NOTE — ED PROVIDER NOTE - MEDICAL DECISION MAKING DETAILS
Pt is septic, in Afib with RVR. Likely soft tissue cellulitis causing Afib with RVR. Pt is septic, in Afib with RVR. Likely soft tissue cellulitis causing Afib with RVR. plan to treat for sepsis, CT abd/pelvis, x-ray RLE, admit for further evaluation.

## 2018-11-13 NOTE — ED ADULT NURSE NOTE - OBJECTIVE STATEMENT
pt is 67 yo female presents to er for nausea, fever, and right leg pain, pt states she is being treated for celluliti pt is 69 yo female presents to er for nausea, fever, and right leg pain.

## 2018-11-13 NOTE — H&P ADULT - PROBLEM SELECTOR PLAN 4
- cont. Metoprolol succinate 100mg PO QD  - I&Os  - Daily Weight's  - Cardiology Consult - Leyla ELENA

## 2018-11-13 NOTE — H&P ADULT - NSHPPHYSICALEXAM_GEN_ALL_CORE
Vital Signs Last 24 Hrs  T(C): 36.8 (13 Nov 2018 22:09), Max: 37.7 (13 Nov 2018 15:44)  T(F): 98.2 (13 Nov 2018 22:09), Max: 99.8 (13 Nov 2018 15:44)  HR: 90 (13 Nov 2018 22:09) (90 - 180)  BP: 129/55 (13 Nov 2018 22:09) (102/66 - 145/115)  BP(mean): --  RR: 18 (13 Nov 2018 22:09) (18 - 24)  SpO2: 96% (13 Nov 2018 22:09) (94% - 97%)

## 2018-11-13 NOTE — ED PROVIDER NOTE - FAMILY HISTORY
Mother  Still living? No  Family history of breast cancer in mother, Age at diagnosis: Age Unknown  Family history of diabetes mellitus in mother, Age at diagnosis: Age Unknown

## 2018-11-13 NOTE — ED ADULT NURSE REASSESSMENT NOTE - NS ED NURSE REASSESS COMMENT FT1
pt received. alert and oriented. no signs of distress. resting comfortably in chair. no distress noted. no complaints at this time. will monitor.

## 2018-11-13 NOTE — H&P ADULT - NSHPSOCIALHISTORY_GEN_ALL_CORE
Lives with , is ambulatory with uniboot. Patient with remote history of smoking 20 pack years quit 30 years ago, no drinking, no illicit drug use. Works in [employer] in [place] currently unable to work due to recurrent cellulitis since 8/13/18.

## 2018-11-13 NOTE — ED PROVIDER NOTE - NS_ ATTENDINGSCRIBEDETAILS _ED_A_ED_FT
The scribe's documentation has been prepared under my direction and personally reviewed by me in its entirety.  I confirm that the note above accurately reflects all my work, treatment, procedures, and decision making except where otherwise noted or amended by me.  Hari Turner M.D.

## 2018-11-13 NOTE — ED PROVIDER NOTE - OBJECTIVE STATEMENT
69 y/o female with a PMHx of Afib on Warfarin, DM, HTN, CHF, s/p cholecystectomy presents to the ED c/o fever, Tmax 101.4, at home today. Pt was seen by vascular doctor, Dr. Yoo, yesterday for bilateral lower extremity dressing. Pt had 9 stitches placed to right lower extremity after taking uniboot off. +bilateral lower extremity pain. Pt with a recent h/o lower extremity cellulitis. Pt took Percocet this morning at 7:00 for lower extremity pain, none taken since. No cough, rhinorrhea, urinary complaints. Non smoker. No recent EtOH use. No illicit drug use. Allergic to Penicillin (hives if taken). Cardio: Dr. Mayer. 69 y/o female with a PMHx of Afib on Warfarin, DM, HTN, CHF, s/p cholecystectomy presents to the ED c/o fever, Tmax 101.4, at home today. Pt was seen by vascular doctor, Dr. Yoo, yesterday for bilateral lower extremity dressing. Pt had 9 stitches placed to right lower extremity after taking fabricio boot off and suffering laceration. +bilateral lower extremity pain, redness, and edema. Pt with a recent h/o lower extremity cellulitis. Pt took Percocet this morning at 7:00 for lower extremity pain, none taken since. No cough, rhinorrhea, urinary complaints. Non smoker. No recent EtOH use. No illicit drug use. Allergic to Penicillin (hives if taken). Cardio: Dr. Mayer.

## 2018-11-13 NOTE — H&P ADULT - PROBLEM SELECTOR PLAN 1
Lactate 3.0; WBC 23.88;     - Aztreonam 2g IV q8h  - Vancomycin 1750mg IV q12h  - ID Consult  - IV NS 75cc/hr [id number]  - Lactate in AM  - CBC in AM  - Monitor vitals to follow resolution of infection  - f/u Blood cx  - Tylenol 650mg PO q6h PRN Lactate 3.0; WBC 23.88;     - Cefepime 2g IV q8h  - Vancomycin 1750mg IV q12h  - ID Consult  - IV NS 75cc/hr [id number]  - Lactate in AM  - CBC in AM  - Monitor vitals to follow resolution of infection  - f/u Blood cx  - Tylenol 650mg PO q6h PRN

## 2018-11-13 NOTE — H&P ADULT - HISTORY OF PRESENT ILLNESS
Patient is 68y F PMHx of DM, CHF, Venous Insufficiency, Ulcerative Colitis, HTN, HLD, presents to [facility] on 11/13/18, with fevers starting the night before admission and RLE pain. Patient with chronic ulcer of the posterior calf of the RLE, has been causing recurrent cellulitis with multiple hospital admissions since 8/18, patient admits that in the past couple of days her leg has been having increased swelling, erythema and pain. Pain is non-radiating constant severe burning pain with no alleviation to NSAIDs and Percocet. Patient admits that the pain is associated with chills, nausea with heaving no vomiting, as well as shortness of breath and palpitations, found to be in Afib RVR in the ED. Patient also admits some diarrhea but is associated with her UC.

## 2018-11-13 NOTE — ED PROVIDER NOTE - ATTENDING CONTRIBUTION TO CARE
MARANDA Turner MD,  performed the initial face to face bedside interview with this patient regarding history of present illness, review of symptoms and relevant past medical, social and family history.  I completed an independent physical examination.  I was the initial provider who evaluated this patient. I have signed out the follow up of any pending tests (i.e. labs, radiological studies) to the resident.  I have communicated the patient’s plan of care and disposition with the resident.

## 2018-11-13 NOTE — H&P ADULT - EXTREMITIES COMMENTS
Patient with b/l Pitting edema 3+, RLE is tender erythematous with heat. Patient admits tenderness to palpation. ACE Wraps seen around feet bilaterally, RLE ACE was removed for examination, underlying kerlix gauze c/d/i.

## 2018-11-13 NOTE — ED PROVIDER NOTE - PROGRESS NOTE DETAILS
Scribtray [place]HTON for ED attending, Dr. Turner: Updated pt on results. Justification for admission: Sepsis, cellulitis, DM foot ulcer, AFIB with RVR.  Given presentation and findings, patient requires hospitalization.  I discussed all findings from our evaluation today with the patient and the medical necessity for admission to the hospital.  I addressed expectations regarding the admission and I discussed the plan of care in detail.  I addressed all questions and concerns regarding the admission and the plan of care.  I used verbal teach back to confirm understanding.  The patient agreed with the admission and the plan of care.  Pt signed out to Dr. Hobson.  Patient's history, vital signs, lab results, imaging, pertinent findings, and clinical condition reviewed during sign out.  At sign out patient admitted in hemodynamically stable condition in no acute distress. [place] Sepsis reassessment: AFIB with RVR resolved demonstrating good response to fluid therapy.  Increase in lactate will repeat at this time.  VS WNL.  No indication for pressers or central line at this time. JW lactate cleared demonstrating good response to resuscitation.

## 2018-11-14 DIAGNOSIS — K51.90 ULCERATIVE COLITIS, UNSPECIFIED, WITHOUT COMPLICATIONS: ICD-10-CM

## 2018-11-14 LAB
ANION GAP SERPL CALC-SCNC: 6 MMOL/L — SIGNIFICANT CHANGE UP (ref 5–17)
BUN SERPL-MCNC: 8 MG/DL — SIGNIFICANT CHANGE UP (ref 7–23)
CALCIUM SERPL-MCNC: 7.8 MG/DL — LOW (ref 8.5–10.1)
CHLORIDE SERPL-SCNC: 106 MMOL/L — SIGNIFICANT CHANGE UP (ref 96–108)
CK SERPL-CCNC: 41 U/L — SIGNIFICANT CHANGE UP (ref 26–192)
CO2 SERPL-SCNC: 28 MMOL/L — SIGNIFICANT CHANGE UP (ref 22–31)
CREAT SERPL-MCNC: 0.47 MG/DL — LOW (ref 0.5–1.3)
CULTURE RESULTS: NO GROWTH — SIGNIFICANT CHANGE UP
GLUCOSE BLDC GLUCOMTR-MCNC: 124 MG/DL — HIGH (ref 70–99)
GLUCOSE BLDC GLUCOMTR-MCNC: 133 MG/DL — HIGH (ref 70–99)
GLUCOSE BLDC GLUCOMTR-MCNC: 166 MG/DL — HIGH (ref 70–99)
GLUCOSE BLDC GLUCOMTR-MCNC: 172 MG/DL — HIGH (ref 70–99)
GLUCOSE SERPL-MCNC: 123 MG/DL — HIGH (ref 70–99)
HBA1C BLD-MCNC: 6.5 % — HIGH (ref 4–5.6)
HCT VFR BLD CALC: 35.7 % — SIGNIFICANT CHANGE UP (ref 34.5–45)
HGB BLD-MCNC: 11.4 G/DL — LOW (ref 11.5–15.5)
INR BLD: 2.38 RATIO — HIGH (ref 0.88–1.16)
LACTATE SERPL-SCNC: 1.7 MMOL/L — SIGNIFICANT CHANGE UP (ref 0.7–2)
MCHC RBC-ENTMCNC: 31 PG — SIGNIFICANT CHANGE UP (ref 27–34)
MCHC RBC-ENTMCNC: 31.9 GM/DL — LOW (ref 32–36)
MCV RBC AUTO: 97 FL — SIGNIFICANT CHANGE UP (ref 80–100)
NRBC # BLD: 0 /100 WBCS — SIGNIFICANT CHANGE UP (ref 0–0)
PLATELET # BLD AUTO: 289 K/UL — SIGNIFICANT CHANGE UP (ref 150–400)
POTASSIUM SERPL-MCNC: 3.1 MMOL/L — LOW (ref 3.5–5.3)
POTASSIUM SERPL-SCNC: 3.1 MMOL/L — LOW (ref 3.5–5.3)
PROTHROM AB SERPL-ACNC: 27.1 SEC — HIGH (ref 10–12.9)
RBC # BLD: 3.68 M/UL — LOW (ref 3.8–5.2)
RBC # FLD: 13.9 % — SIGNIFICANT CHANGE UP (ref 10.3–14.5)
SODIUM SERPL-SCNC: 140 MMOL/L — SIGNIFICANT CHANGE UP (ref 135–145)
SPECIMEN SOURCE: SIGNIFICANT CHANGE UP
WBC # BLD: 15.91 K/UL — HIGH (ref 3.8–10.5)
WBC # FLD AUTO: 15.91 K/UL — HIGH (ref 3.8–10.5)

## 2018-11-14 RX ORDER — AZTREONAM 2 G
2000 VIAL (EA) INJECTION EVERY 8 HOURS
Qty: 0 | Refills: 0 | Status: DISCONTINUED | OUTPATIENT
Start: 2018-11-14 | End: 2018-11-14

## 2018-11-14 RX ORDER — MORPHINE SULFATE 50 MG/1
2 CAPSULE, EXTENDED RELEASE ORAL ONCE
Qty: 0 | Refills: 0 | Status: DISCONTINUED | OUTPATIENT
Start: 2018-11-14 | End: 2018-11-14

## 2018-11-14 RX ORDER — HEPARIN SODIUM 5000 [USP'U]/ML
5000 INJECTION INTRAVENOUS; SUBCUTANEOUS EVERY 12 HOURS
Qty: 0 | Refills: 0 | Status: DISCONTINUED | OUTPATIENT
Start: 2018-11-14 | End: 2018-11-20

## 2018-11-14 RX ORDER — VANCOMYCIN HCL 1 G
1750 VIAL (EA) INTRAVENOUS EVERY 12 HOURS
Qty: 0 | Refills: 0 | Status: DISCONTINUED | OUTPATIENT
Start: 2018-11-14 | End: 2018-11-14

## 2018-11-14 RX ORDER — POTASSIUM CHLORIDE 20 MEQ
40 PACKET (EA) ORAL ONCE
Qty: 0 | Refills: 0 | Status: COMPLETED | OUTPATIENT
Start: 2018-11-14 | End: 2018-11-14

## 2018-11-14 RX ORDER — ACETAMINOPHEN 500 MG
650 TABLET ORAL EVERY 6 HOURS
Qty: 0 | Refills: 0 | Status: DISCONTINUED | OUTPATIENT
Start: 2018-11-14 | End: 2018-11-20

## 2018-11-14 RX ORDER — CEFEPIME 1 G/1
2000 INJECTION, POWDER, FOR SOLUTION INTRAMUSCULAR; INTRAVENOUS EVERY 12 HOURS
Qty: 0 | Refills: 0 | Status: DISCONTINUED | OUTPATIENT
Start: 2018-11-14 | End: 2018-11-19

## 2018-11-14 RX ADMIN — HEPARIN SODIUM 5000 UNIT(S): 5000 INJECTION INTRAVENOUS; SUBCUTANEOUS at 17:53

## 2018-11-14 RX ADMIN — CEFEPIME 100 MILLIGRAM(S): 1 INJECTION, POWDER, FOR SOLUTION INTRAMUSCULAR; INTRAVENOUS at 17:53

## 2018-11-14 RX ADMIN — GABAPENTIN 300 MILLIGRAM(S): 400 CAPSULE ORAL at 21:06

## 2018-11-14 RX ADMIN — WARFARIN SODIUM 5 MILLIGRAM(S): 2.5 TABLET ORAL at 21:05

## 2018-11-14 RX ADMIN — MORPHINE SULFATE 1 MILLIGRAM(S): 50 CAPSULE, EXTENDED RELEASE ORAL at 19:12

## 2018-11-14 RX ADMIN — CEFEPIME 100 MILLIGRAM(S): 1 INJECTION, POWDER, FOR SOLUTION INTRAMUSCULAR; INTRAVENOUS at 05:19

## 2018-11-14 RX ADMIN — MORPHINE SULFATE 1 MILLIGRAM(S): 50 CAPSULE, EXTENDED RELEASE ORAL at 14:00

## 2018-11-14 RX ADMIN — MORPHINE SULFATE 2 MILLIGRAM(S): 50 CAPSULE, EXTENDED RELEASE ORAL at 04:40

## 2018-11-14 RX ADMIN — ZOLPIDEM TARTRATE 5 MILLIGRAM(S): 10 TABLET ORAL at 21:36

## 2018-11-14 RX ADMIN — Medication 650 MILLIGRAM(S): at 01:45

## 2018-11-14 RX ADMIN — Medication 40 MILLIEQUIVALENT(S): at 21:05

## 2018-11-14 RX ADMIN — Medication 110 MILLIGRAM(S): at 13:37

## 2018-11-14 RX ADMIN — Medication 325 MILLIGRAM(S): at 11:25

## 2018-11-14 RX ADMIN — Medication 81 MILLIGRAM(S): at 11:25

## 2018-11-14 RX ADMIN — MORPHINE SULFATE 1 MILLIGRAM(S): 50 CAPSULE, EXTENDED RELEASE ORAL at 13:41

## 2018-11-14 RX ADMIN — MORPHINE SULFATE 1 MILLIGRAM(S): 50 CAPSULE, EXTENDED RELEASE ORAL at 02:21

## 2018-11-14 RX ADMIN — MORPHINE SULFATE 2 MILLIGRAM(S): 50 CAPSULE, EXTENDED RELEASE ORAL at 03:42

## 2018-11-14 RX ADMIN — MONTELUKAST 10 MILLIGRAM(S): 4 TABLET, CHEWABLE ORAL at 21:06

## 2018-11-14 RX ADMIN — MORPHINE SULFATE 1 MILLIGRAM(S): 50 CAPSULE, EXTENDED RELEASE ORAL at 07:08

## 2018-11-14 RX ADMIN — ATORVASTATIN CALCIUM 10 MILLIGRAM(S): 80 TABLET, FILM COATED ORAL at 21:06

## 2018-11-14 RX ADMIN — Medication 1: at 11:25

## 2018-11-14 RX ADMIN — Medication 100 MILLIGRAM(S): at 05:19

## 2018-11-14 NOTE — PROGRESS NOTE ADULT - SUBJECTIVE AND OBJECTIVE BOX
CHIEF COMPLAINT:    SUBJECTIVE:   HPI  11/13/18  Patient is 68y F PMHx of DM, CHF, Venous Insufficiency, Ulcerative Colitis, HTN, HLD, presents to [facility] on 11/13/18, with fevers starting the night before admission and RLE pain. Patient with chronic ulcer of the posterior calf of the RLE, has been causing recurrent cellulitis with multiple hospital admissions since 8/18, patient admits that in the past couple of days her leg has been having increased swelling, erythema and pain. Pain is non-radiating constant severe burning pain with no alleviation to NSAIDs and Percocet. Patient admits that the pain is associated with chills, nausea with heaving no vomiting, as well as shortness of breath and palpitations, found to be in Afib RVR in the ED. Patient also admits some diarrhea but is associated with her UC.      ---  SUBJECTIVE   11/14/18  Patient was seen and evaluated at bedside. States RLE pain has been 10/10 with burning sensation and chills also present. No other complaints at this time. Potassium was repleted this AM.       REVIEW OF SYSTEMS:    CONSTITUTIONAL: No weakness, no fevers , +chills  EYES/ENT: No visual changes;  No vertigo or throat pain   NECK: No pain or stiffness  RESPIRATORY: No cough, wheezing, hemoptysis; No shortness of breath  CARDIOVASCULAR: No chest pain or palpitations  GASTROINTESTINAL: No abdominal or epigastric pain. No nausea, vomiting, or hematemesis; No diarrhea or constipation. No melena or hematochezia.  GENITOURINARY: No dysuria, frequency or hematuria  NEUROLOGICAL: No numbness or weakness  SKIN: No itching, burning, rashes, or lesions   EXTREMITIES: RLE pain  All other review of systems is negative unless indicated above    Vital Signs Last 24 Hrs  T(C): 36.6 (14 Nov 2018 10:35), Max: 37.7 (13 Nov 2018 15:44)  T(F): 97.9 (14 Nov 2018 10:35), Max: 99.9 (14 Nov 2018 01:00)  HR: 98 (14 Nov 2018 10:35) (90 - 180)  BP: 107/51 (14 Nov 2018 10:35) (98/52 - 152/51)  BP(mean): --  RR: 20 (14 Nov 2018 10:35) (16 - 24)  SpO2: 94% (14 Nov 2018 10:35) (94% - 98%)    I&O's Summary    13 Nov 2018 07:01  -  14 Nov 2018 07:00  --------------------------------------------------------  IN: 2500 mL / OUT: 300 mL / NET: 2200 mL        CAPILLARY BLOOD GLUCOSE      POCT Blood Glucose.: 166 mg/dL (14 Nov 2018 11:13)  POCT Blood Glucose.: 124 mg/dL (14 Nov 2018 08:01)      PHYSICAL EXAM:    Constitutional: NAD, awake and alert, obese  Respiratory: Breath sounds are clear bilaterally, No wheezing, rales or rhonchi  Cardiovascular: S1 and S2, regular rate and rhythm, no Murmurs, gallops or rubs  Gastrointestinal: Bowel Sounds present, soft, nontender, nondistended, no guarding, no rebound  Extremities: LLE peripheral edema noted, with erythematous changes consistent with venous insufficiency. RLE tender to palpation and dressing intact   Vascular: 1+ peripheral pulses      MEDICATIONS:  MEDICATIONS  (STANDING):  aspirin  chewable 81 milliGRAM(s) Oral daily  atorvastatin 10 milliGRAM(s) Oral at bedtime  cefepime   IVPB 2000 milliGRAM(s) IV Intermittent every 12 hours  dextrose 5%. 1000 milliLiter(s) (50 mL/Hr) IV Continuous <Continuous>  dextrose 50% Injectable 12.5 Gram(s) IV Push once  dextrose 50% Injectable 25 Gram(s) IV Push once  dextrose 50% Injectable 25 Gram(s) IV Push once  diltiazem    Tablet 120 milliGRAM(s) Oral two times a day  ferrous    sulfate 325 milliGRAM(s) Oral daily  gabapentin 300 milliGRAM(s) Oral at bedtime  insulin lispro (HumaLOG) corrective regimen sliding scale   SubCutaneous three times a day before meals  insulin lispro (HumaLOG) corrective regimen sliding scale   SubCutaneous at bedtime  metoprolol succinate  milliGRAM(s) Oral daily  montelukast 10 milliGRAM(s) Oral at bedtime  morphine  - Injectable 4 milliGRAM(s) IV Push once  Uceris (Budesonide EC) 9 milliGRAM(s)   Oral daily  warfarin 5 milliGRAM(s) Oral once      LABS: All Labs Reviewed:                        11.4   15.91 )-----------( 289      ( 14 Nov 2018 05:25 )             35.7     11-14    140  |  106  |  8   ----------------------------<  123<H>  3.1<L>   |  28  |  0.47<L>    Ca    7.8<L>      14 Nov 2018 05:25    TPro  7.2  /  Alb  2.7<L>  /  TBili  0.7  /  DBili  x   /  AST  44<H>  /  ALT  46  /  AlkPhos  71  11-13    PT/INR - ( 14 Nov 2018 05:25 )   PT: 27.1 sec;   INR: 2.38 ratio         PTT - ( 13 Nov 2018 16:00 )  PTT:39.1 sec  CARDIAC MARKERS ( 14 Nov 2018 05:25 )  x     / x     / 41 U/L / x     / x      CARDIAC MARKERS ( 13 Nov 2018 20:23 )  <0.015 ng/mL / x     / x     / x     / x      CARDIAC MARKERS ( 13 Nov 2018 16:33 )  <0.015 ng/mL / x     / 44 U/L / x     / x      CARDIAC MARKERS ( 13 Nov 2018 16:00 )  <0.015 ng/mL / x     / x     / x     / x          Blood Culture:     RADIOLOGY/EKG:    DVT PPX:    ADVANCED DIRECTIVE:    DISPOSITION:

## 2018-11-14 NOTE — PHYSICAL THERAPY INITIAL EVALUATION ADULT - GENERAL OBSERVATIONS, REHAB EVAL
Pt rec'd supine in bed, b/l lower legs erythemic, gauze wrap over right calf, pt with c/o moderate pain, cooperative with PT, eager to get OOB.

## 2018-11-14 NOTE — CONSULT NOTE ADULT - SUBJECTIVE AND OBJECTIVE BOX
Patient is a 68y old  Female who presents with a chief complaint of Fevers (2018 12:19)    HPI:  Patient is 68y F PMHx of DM, CHF, Venous Insufficiency, Ulcerative Colitis, HTN, HLD admitted 18, with fevers starting the night before admission and RLE pain. Patient with chronic ulcer of the posterior calf of the RLE, has been causing recurrent cellulitis with multiple hospital admissions since , patient admits that in the past couple of days her leg has been having increased swelling, erythema and pain. Pain is non-radiating constant severe burning pain with no alleviation to NSAIDs and Percocet. Patient admits that the pain is associated with chills, nausea with heaving no vomiting, as well as shortness of breath and palpitations, found to be in Afib RVR in the ED with elevated LA/wbc ct 23, was given IV vancomycin/cefepime for cellulitis.       PMH: as above  PSH: as above  Meds: per reconciliation sheet, noted below  MEDICATIONS  (STANDING):  aspirin  chewable 81 milliGRAM(s) Oral daily  atorvastatin 10 milliGRAM(s) Oral at bedtime  cefepime   IVPB 2000 milliGRAM(s) IV Intermittent every 12 hours  dextrose 5%. 1000 milliLiter(s) (50 mL/Hr) IV Continuous <Continuous>  dextrose 50% Injectable 12.5 Gram(s) IV Push once  dextrose 50% Injectable 25 Gram(s) IV Push once  dextrose 50% Injectable 25 Gram(s) IV Push once  diltiazem    Tablet 120 milliGRAM(s) Oral two times a day  doxycycline IVPB      ferrous    sulfate 325 milliGRAM(s) Oral daily  gabapentin 300 milliGRAM(s) Oral at bedtime  insulin lispro (HumaLOG) corrective regimen sliding scale   SubCutaneous three times a day before meals  insulin lispro (HumaLOG) corrective regimen sliding scale   SubCutaneous at bedtime  metoprolol succinate  milliGRAM(s) Oral daily  montelukast 10 milliGRAM(s) Oral at bedtime  morphine  - Injectable 4 milliGRAM(s) IV Push once  Uceris (Budesonide EC) 9 milliGRAM(s)   Oral daily  warfarin 5 milliGRAM(s) Oral once    Allergies    penicillin (Hives)    Intolerances      Social: no smoking, no alcohol, no illegal drugs; no recent travel, no exposure to TB  FAMILY HISTORY:  Family history of diabetes mellitus in mother (Mother)  Family history of breast cancer in mother (Mother)     no history of premature cardiovascular disease in first degree relatives    ROS:  no HA, no dizziness, no sore throat, no blurry vision, no CP, no palpitations, no SOB, no cough, no abdominal pain, no diarrhea, no N/V, no dysuria, no leg pain, no claudication, no rash, no joint aches, no rectal pain or bleeding, no night sweats  All other systems reviewed and are negative    Vital Signs Last 24 Hrs  T(C): 36.6 (2018 10:35), Max: 37.7 (2018 15:44)  T(F): 97.9 (2018 10:35), Max: 99.9 (2018 01:00)  HR: 98 (2018 10:35) (90 - 180)  BP: 107/51 (2018 10:35) (98/52 - 152/51)  BP(mean): --  RR: 20 (2018 10:35) (16 - 24)  SpO2: 94% (2018 10:35) (94% - 98%)  Daily Height in cm: 177.8 (2018 15:35)    Daily Weight in k.1 (2018 04:56)    PE:    Constitutional: frail looking  HEENT: NC/AT, EOMI, PERRLA, conjunctivae clear; ears and nose atraumatic; pharynx benign  Neck: supple; thyroid not palpable  Back: no tenderness  Respiratory: respiratory effort normal; clear to auscultation  Cardiovascular: S1S2 regular, no murmurs  Abdomen: soft, not tender, not distended, positive BS; liver and spleen WNL  Genitourinary: no suprapubic tenderness  Lymphatic: no LN palpable  Musculoskeletal: no muscle tenderness, no joint swelling or tenderness  Extremities:  pedal/b/l 2-3 + edema  Neurological/ Psychiatric: Judgement and insight normal;  moving all extremities  Skin: LLE stasis dermatitis changes, no erythema Right calf ulcer w/ discharge surroundning erythema/edema/warmth no palpable lesions    Labs: all available labs reviewed                        11.4   15.91 )-----------( 289      ( 2018 05:25 )             35.7     11-14    140  |  106  |  8   ----------------------------<  123<H>  3.1<L>   |  28  |  0.47<L>    Ca    7.8<L>      2018 05:25    TPro  7.2  /  Alb  2.7<L>  /  TBili  0.7  /  DBili  x   /  AST  44<H>  /  ALT  46  /  AlkPhos  71       LIVER FUNCTIONS - ( 2018 16:00 )  Alb: 2.7 g/dL / Pro: 7.2 gm/dL / ALK PHOS: 71 U/L / ALT: 46 U/L / AST: 44 U/L / GGT: x           Urinalysis Basic - ( 2018 19:35 )    Color: Yellow / Appearance: Clear / S.005 / pH: x  Gluc: x / Ketone: Negative  / Bili: Negative / Urobili: Negative mg/dL   Blood: x / Protein: 15 mg/dL / Nitrite: Positive   Leuk Esterase: Small / RBC: 3-5 /HPF / WBC    Sq Epi: x / Non Sq Epi: Moderate / Bacteria: Moderate          Radiology: all available radiological tests reviewed  < from: CT Lower Extremity w/ IV Cont, Right (18 @ 18:20) >    EXAM:  CT LWR EXT IC RT                            PROCEDURE DATE:  2018          INTERPRETATION:  [facility] RADIOLOGIST PRELIMINARY REPORT    EXAM:    CT Right Lower Extremity With IV Contrast, Tibia and Fibula     EXAM DATE/TIME:    2018 6:31 PM     CLINICAL HISTORY:    68 years old, female; Pain; Lower leg; Right     TECHNIQUE:    CT of the Right lower extremity with intravenous contrast was performed.   Exam   focused on the tibia and fibula.    Coronal and sagittal reformatted images were created and reviewed.     COMPARISON:    CR XR LOWER EXTREMITY RIGHT 2018 5:45 PM     FINDINGS:    Bones/joints:  Severe tricompartment degenerative changes of the knee.   Small   partially visualized the patella joint effusion with calcifications.   Degenerative changes in the foot. Old nonunited fractures in the medial   malleolus. No acute bony abnormality identified. No obvious bony   destructive   changes. Calcaneal spurs.    Soft tissues:  Extensive diffuse soft tissue edema andskin thickening.   No   loculated fluid collection. Possible skin ulceration in the posterior   calf.    Vasculature:  Arteriosclerosis.     IMPRESSION:   1. Extensive diffuse soft tissue edema and skin thickening. No loculated   fluid   collection. Possible skin ulceration in the posterior calf.   2. No acute bony abnormality or obvious bony destructive changes.   3. Degenerative changes of the bones.   4. Arteriosclerosis.   5. Other findings as described above.    CT LWR EXT IC RT dated 2018 6:20 PM     INDICATION: Right lower leg pain and swelling with wound.    COMPARISON: Ankle radiographs dated 2018    TECHNIQUE: CT imaging of the right lower extremity was performed with   contrast. The data was reformatted in the axial, coronal, and sagittal   planes.   Contrast: Omnipaque 350. Administered: 150 cc per discarded: 50 cc.    FINDINGS:    OSSEOUS STRUCTURES: There is no cortical erosion or destruction to   suggest osteomyelitis. No periosteal reaction is seen. There is severe   osteoarthritis of the knee with marked joint space narrowing the   patellofemoral compartment. Severe narrowing of the medial knee joint   space. Subchondral sclerosis and cystic changes are noted. There are   degenerative changes at the midfoot joint with moderate to severe   narrowing at the second through fourth TMT's. There are subchondral   cystic changes at the second TMT.  SYNOVIUM/ JOINT FLUID: There is partially imaged joint effusion at the   knee.  TENDONS: The tendons are intact.No full-thickness tendon tear or   retraction is noted.  MUSCLES: There is no intramuscular hematoma. There is a small   intramuscular lipoma involving the medial gastrocnemius muscle inferiorly   measuring up to 1.6 cm.  NEUROVASCULAR STRUCTURES: There is moderate to severe vascular   calcification present.  SUBCUTANEOUS SOFT TISSUES: There is circumferential soft tissue swelling   about the calf. There is mild associated skin thickening. There is a   small suspected skin defect at the posterioraspect of the lower calf   measuring up to 1.4 cm. Skin irregularity is noted along the dorsum of   the midfoot and forefoot with overlying bandage. A focal confluent area   of hypodensity is appreciated overlying the base of the first metatarsal   measuring approximately 1.0 x 1.0 x 3.1 cm (series 2, image 214).      IMPRESSION:    1.  Circumferential soft tissue swelling about the calf with extension   into the foot. Nonspecific but can be seen in the setting of cellulitis.  2.  Focal area of hypodensity in the soft tissues over the base of the   first metatarsal concerning for phlegmonous change versus early abscess   formation.  3.  No CT evidence for osteomyelitis. If concern persists, MRI or bone   scan can be performed.    < end of copied text >    Advanced directives addressed: full resuscitation

## 2018-11-14 NOTE — PROGRESS NOTE ADULT - ASSESSMENT
68y F PMHx of DM, CHF, Venous Insufficiency, Ulcerative Colitis, HTN, HLD, recurrent cellulitis presents to [facility] on 11/13/18, with fevers starting the night before admission and RLE pain 2/2 cellulitis.    #Cellulitis  -monitor temp and WBC  -Continue cefepime 2 g BID    #DM2 68y F PMHx of DM, CHF, Venous Insufficiency, Ulcerative Colitis, HTN, HLD, recurrent cellulitis presents to [facility] on 11/13/18, with fevers starting the night before admission and RLE pain 2/2 cellulitis.    #Cellulitis  -monitor temp and WBC  -Continue cefepime 2 g BID    #Afib  -CHADSVASc= 5   -continue diltiazem and coumadin and ASA    #DM2  -ISS    #HTN  -Metoprolol 100 mg QD   -Cardio recs appreciated- can increase metoprolol to Q12    #HLD  -Continue atorvastatin    #Anemia  -Hg= 11.4  -continue ferrous sulfate     #UC  -Continue Uceris     #CHF 68y F PMHx of DM, CHF, Venous Insufficiency, Ulcerative Colitis, HTN, HLD, recurrent cellulitis presents to [facility] on 11/13/18, with fevers starting the night before admission and RLE pain 2/2 cellulitis.    #Cellulitis  -monitor temp and WBC  -Continue cefepime 2 g BID  -ID recs appreciated- agree with cefepime [id number] for gnr resistant coverage  - add doxy 100mg BID for mrsa coverage    #Afib  -CHADSVASc= 5   -continue diltiazem and coumadin and ASA    #DM2  -ISS    #HTN  -Metoprolol 100 mg QD   -Cardio recs appreciated- can increase metoprolol to Q12    #HLD  -Continue atorvastatin    #Anemia  -Hg= 11.4  -continue ferrous sulfate     #UC  -Continue Uceris     #CHF  -consider echo  -continue metoprolol 68y F PMHx of DM, CHF, Venous Insufficiency, Ulcerative Colitis, HTN, HLD, recurrent cellulitis presents to [facility] on 11/13/18, with fevers starting the night before admission and RLE pain 2/2 cellulitis.    #Cellulitis  -monitor temp and WBC  -Continue cefepime 2 g BID  -ID recs appreciated- agree with cefepime [id number] for gnr resistant coverage  - add doxy 100mg BID for mrsa coverage    #Afib  -CHADSVASc= 5   -continue diltiazem and coumadin and ASA    #DM2  -ISS    #HTN  -Metoprolol 100 mg QD   -Cardio recs appreciated- can increase metoprolol to Q12, if HR increases    #HLD  -Continue atorvastatin    #Anemia  -Hg= 11.4  -continue ferrous sulfate     #UC  -Continue Uceris     #CHF  -consider echo  -continue metoprolol

## 2018-11-14 NOTE — PHYSICAL THERAPY INITIAL EVALUATION ADULT - PERTINENT HX OF CURRENT PROBLEM, REHAB EVAL
68y F PMHx of DM, CHF, Venous Insufficiency, Ulcerative Colitis, HTN, HLD, presents to [facility] on 11/13/18, with fevers starting the night before admission and RLE pain. Patient with chronic ulcer of the posterior calf of the RLE, has been causing recurrent cellulitis with multiple hospital admissions since 8/18, patient admits that in the past couple of days her leg has been having increased swelling, erythema and pain.

## 2018-11-14 NOTE — CONSULT NOTE ADULT - SUBJECTIVE AND OBJECTIVE BOX
CHIEF COMPLAINT: Patient is a 68y old  Female who presents with a chief complaint of Fevers (13 Nov 2018 23:01)      HPI: HPI:  Patient is 68y F PMHx of DM, CHF, Afib, HFpEF, Venous Insufficiency, Ulcerative Colitis, HTN, HLD, presents to [facility] on 11/13/18, with fevers starting the night before admission and RLE pain. Patient with chronic ulcer of the posterior calf of the RLE, has been causing recurrent cellulitis with multiple hospital admissions since 8/18, patient admits that in the past couple of days her leg has been having increased swelling, erythema and pain. Pain is non-radiating constant severe burning pain with no alleviation to NSAIDs and Percocet. Patient admits that the pain is associated with chills, nausea with heaving no vomiting, as well as shortness of breath and palpitations, found to be in Afib RVR in the ED. Patient also admits some diarrhea but is associated with her UC. (13 Nov 2018 23:01)      PMHx: PAST MEDICAL & SURGICAL HISTORY:  HTN (hypertension)  Thyroid nodule  Peripheral venous insufficiency  Neuropathy  Morbid obesity with BMI of 40.0-44.9, adult  Dyspnea  Congestive heart failure/HFpEF  Atrial fibrillation  Diabetes mellitus type II, controlled  Status post medial meniscus repair  S/P left knee arthroscopy  Other complications of gastric band procedure  H/O colonoscopy  History of esophagogastroduodenoscopy (EGD)        Soc Hx:     FAMILY HISTORY:  Family history of diabetes mellitus in mother (Mother)  Family history of breast cancer in mother (Mother)      Allergies: Allergies    penicillin (Hives)    Intolerances          REVIEW OF SYSTEMS:    As above  No chest pain + shortness of breath  No lightheadeness or syncope  + leg swelling  No palpitations  No claudication-like symptoms    Vital Signs Last 24 Hrs  T(C): 36.8 (14 Nov 2018 04:56), Max: 37.7 (13 Nov 2018 15:44)  T(F): 98.2 (14 Nov 2018 04:56), Max: 99.9 (14 Nov 2018 01:00)  HR: 105 (14 Nov 2018 04:56) (90 - 180)  BP: 152/51 (14 Nov 2018 04:56) (98/52 - 152/51)  BP(mean): --  RR: 20 (14 Nov 2018 04:56) (16 - 24)  SpO2: 95% (14 Nov 2018 04:56) (94% - 98%)    I&O's Summary    13 Nov 2018 07:01  -  14 Nov 2018 07:00  --------------------------------------------------------  IN: 2500 mL / OUT: 300 mL / NET: 2200 mL        CAPILLARY BLOOD GLUCOSE      POCT Blood Glucose.: 124 mg/dL (14 Nov 2018 08:01)      PHYSICAL EXAM:   Patient in NAD  Neck: No JVD; Carotids:  2+ without bruits  Respiratory:  Clear to A&P  Cardiovascular: S1 and S2, regular rate and rhythm, no Murmurs, gallops or rubs  Gastrointestinal:  Soft, non-tender; BS positive  Extremities: Bilateral chronic venous stasis changes/edema with superimposed LLE cellulitis  Vascular: 2+ peripheral pulses  Neurological: A/O x 3, no focal deficits      MEDICATIONS:  MEDICATIONS  (STANDING):  aspirin  chewable 81 milliGRAM(s) Oral daily  atorvastatin 10 milliGRAM(s) Oral at bedtime  cefepime   IVPB 2000 milliGRAM(s) IV Intermittent every 12 hours  dextrose 5%. 1000 milliLiter(s) (50 mL/Hr) IV Continuous <Continuous>  dextrose 50% Injectable 12.5 Gram(s) IV Push once  dextrose 50% Injectable 25 Gram(s) IV Push once  dextrose 50% Injectable 25 Gram(s) IV Push once  diltiazem    Tablet 120 milliGRAM(s) Oral two times a day  ferrous    sulfate 325 milliGRAM(s) Oral daily  gabapentin 300 milliGRAM(s) Oral at bedtime  insulin lispro (HumaLOG) corrective regimen sliding scale   SubCutaneous three times a day before meals  insulin lispro (HumaLOG) corrective regimen sliding scale   SubCutaneous at bedtime  metoprolol succinate  milliGRAM(s) Oral daily  montelukast 10 milliGRAM(s) Oral at bedtime  morphine  - Injectable 4 milliGRAM(s) IV Push once  Uceris (Budesonide EC) 9 milliGRAM(s)   Oral daily  vancomycin  IVPB 1750 milliGRAM(s) IV Intermittent every 12 hours  warfarin 5 milliGRAM(s) Oral once    Home Medications:  aspirin 81 mg oral tablet: 1 tab(s) orally once a day (13 Nov 2018 23:12)  dicyclomine 20 mg oral tablet: 1 tab(s) orally once a day, As Needed (13 Nov 2018 23:12)  dilTIAZem 120 mg oral tablet: 1 tab(s) orally 2 times a day (13 Nov 2018 23:12)  ferrous sulfate 250 mg oral capsule, extended release: 1 cap(s) orally once a day (13 Nov 2018 23:12)  furosemide 40 mg oral tablet: 1 tab(s) orally 2 times a day (13 Nov 2018 23:12)  gabapentin 300 mg oral capsule: 1 cap(s) orally once a day (at bedtime) (13 Nov 2018 23:12)  metFORMIN 1000 mg oral tablet: 1 tab(s) orally 2 times a day (13 Nov 2018 23:12)  Metoprolol Succinate  mg oral tablet, extended release: 1 tab(s) orally once a day (13 Nov 2018 23:12)  montelukast 10 mg oral tablet: 1 tab(s) orally once a day (at bedtime) (13 Nov 2018 23:12)  potassium chloride 20 mEq oral tablet, extended release: 1 tab(s) orally 2 times a day (13 Nov 2018 23:12)  pravastatin 10 mg oral tablet: 1 tab(s) orally once a day (13 Nov 2018 23:12)  Uceris 9 mg oral tablet, extended release: 1 tab(s) orally once a day (in the morning) (13 Nov 2018 23:12)  warfarin 5 mg oral tablet: 1 tab(s) orally once a day (13 Nov 2018 23:12)  zolpidem 10 mg oral tablet: 1 tab(s) orally once a day (at bedtime), As Needed (13 Nov 2018 23:12)        LABS: All Labs Reviewed:  Blood Culture:   BNP Serum Pro-Brain Natriuretic Peptide: 1965 pg/mL (11-13 @ 16:33)  Serum Pro-Brain Natriuretic Peptide: 2002 pg/mL (11-13 @ 16:00)    CBC             WBC Count: 15.91 K/uL (11-14 @ 05:25)  WBC Count: 23.88 K/uL (11-13 @ 16:00)              Hemoglobin: 11.4 g/dL (11-14 @ 05:25)  Hemoglobin: 13.3 g/dL (11-13 @ 16:00)              Hematocrit: 35.7 % (11-14 @ 05:25)  Hematocrit: 41.0 % (11-13 @ 16:00)              Mean Cell Volume: 97.0 fl (11-14 @ 05:25)  Mean Cell Volume: 93.2 fl (11-13 @ 16:00)              Platelet Count - Automated: 289 K/uL (11-14 @ 05:25)  Platelet Count - Automated: 373 K/uL (11-13 @ 16:00)                            Cardiac markers             Troponin I, Serum: <0.015 ng/mL (11-13 @ 20:23)  Troponin I, Serum: <0.015 ng/mL (11-13 @ 16:33)  Troponin I, Serum: <0.015 ng/mL (11-13 @ 16:00)                             Chems        Sodium, Serum: 140 mmol/L (11-14 @ 05:25)  Sodium, Serum: 137 mmol/L (11-13 @ 16:00)          Potassium, Serum: 3.1 mmol/L (11-14 @ 05:25)  Potassium, Serum: 3.5 mmol/L (11-13 @ 16:00)          Blood Urea Nitrogen, Serum: 8 mg/dL (11-14 @ 05:25)  Blood Urea Nitrogen, Serum: 12 mg/dL (11-13 @ 16:00)          Creatinine 0.47  Creatinine 0.76                  Protein Total, Serum: 7.2 gm/dL (11-13 @ 16:00)                  Calcium, Total Serum: 7.8 mg/dL (11-14 @ 05:25)  Calcium, Total Serum: 8.6 mg/dL (11-13 @ 16:00)                  Bilirubin Total, Serum: 0.7 mg/dL (11-13 @ 16:00)          Alanine Aminotransferase (ALT/SGPT): 46 U/L (11-13 @ 16:00)          Aspartate Aminotransferase (AST/SGOT): 44 U/L (11-13 @ 16:00)                 INR: 2.38 ratio (11-14 @ 05:25)  INR: 2.61 ratio (11-13 @ 16:00)             RADIOLOGY:    EKG:  Atrial fibrillation with a rapid VR; PRWP    Telemetry:  Atrial fibrillation with a controlled VR    ECHO:

## 2018-11-14 NOTE — CONSULT NOTE ADULT - ASSESSMENT
68 year old woman with the above history admitted with recurrent LE cellulitis.  Her rapid rates related to her atrial fibrillation and HFpEF are probably secondary to the underlying infection.  Her rates are already improved compared to admission.  Will continue her current medications.  If her rates increase we can increase her metoprolol to Q 12 hours.

## 2018-11-14 NOTE — CONSULT NOTE ADULT - ASSESSMENT
68y F PMHx of DM, CHF, Venous Insufficiency, Ulcerative Colitis, HTN, HLD admitted 11/13/18, with fevers starting the night before admission and RLE pain. Patient with chronic ulcer of the posterior calf of the RLE, has been causing recurrent cellulitis with multiple hospital admissions since 8/18, patient admits that in the past couple of days her leg has been having increased swelling, erythema and pain. Pain is non-radiating constant severe burning pain with no alleviation to NSAIDs and Percocet. Patient admits that the pain is associated with chills, nausea with heaving no vomiting, as well as shortness of breath and palpitations, found to be in Afib RVR in the ED with elevated LA/wbc ct 23, was given IV vancomycin/cefepime for cellulitis.     1. sepsis/chronic b/l LE stasis dermatitis/superimposed RLE cellulitis/PVD/obesity  - agree with cefepime [id number] for gnr resistant coverage  - add doxy 100mg BID for mrsa coverage  - f/u cultures  - wound care/vascular eval  - monitor temps  - tolerating abx well so far; no side effects noted  - reason for abx use and side effects reviewed with patient  - supportive care  - f/u cbc    2. other issues - care per medicine

## 2018-11-14 NOTE — CONSULT NOTE ADULT - SUBJECTIVE AND OBJECTIVE BOX
Vascular Surgery Consult    HPI:  Patient is 68y F PMHx of DM, CHF, Venous Insufficiency, Ulcerative Colitis, HTN, HLD, presents to [facility] on 18, with fevers starting the night before admission and RLE pain. Patient with chronic ulcer of the posterior calf of the RLE, has been causing recurrent cellulitis with multiple hospital admissions since , patient admits that in the past couple of days her leg has been having increased swelling, erythema and pain. Pain is non-radiating constant severe burning pain with no alleviation to NSAIDs and Percocet. Patient admits that the pain is associated with chills, nausea with heaving no vomiting, as well as shortness of breath and palpitations, found to be in Afib RVR in the ED. Patient also admits some diarrhea but is associated with her UC. (2018 23:01)    Patient well known to me, seen in office yesterday, advised to come to ED. Has cellulitis and infected hematoma right foot. Feels better today.      PAST MEDICAL & SURGICAL HISTORY:  HTN (hypertension)  Thyroid nodule  Peripheral venous insufficiency  Neuropathy  Morbid obesity with BMI of 40.0-44.9, adult  Dyspnea  Congestive heart failure  Atrial fibrillation  Diabetes mellitus type II, controlled  Status post medial meniscus repair  S/P left knee arthroscopy  Other complications of gastric band procedure  H/O colonoscopy  History of esophagogastroduodenoscopy (EGD)      MEDICATIONS  (STANDING):  aspirin  chewable 81 milliGRAM(s) Oral daily  atorvastatin 10 milliGRAM(s) Oral at bedtime  cefepime   IVPB 2000 milliGRAM(s) IV Intermittent every 12 hours  dextrose 5%. 1000 milliLiter(s) (50 mL/Hr) IV Continuous <Continuous>  dextrose 50% Injectable 12.5 Gram(s) IV Push once  dextrose 50% Injectable 25 Gram(s) IV Push once  dextrose 50% Injectable 25 Gram(s) IV Push once  diltiazem    Tablet 120 milliGRAM(s) Oral two times a day  doxycycline IVPB      ferrous    sulfate 325 milliGRAM(s) Oral daily  gabapentin 300 milliGRAM(s) Oral at bedtime  heparin  Injectable 5000 Unit(s) SubCutaneous every 12 hours  insulin lispro (HumaLOG) corrective regimen sliding scale   SubCutaneous three times a day before meals  insulin lispro (HumaLOG) corrective regimen sliding scale   SubCutaneous at bedtime  metoprolol succinate  milliGRAM(s) Oral daily  montelukast 10 milliGRAM(s) Oral at bedtime  morphine  - Injectable 4 milliGRAM(s) IV Push once  Uceris (Budesonide EC) 9 milliGRAM(s) 9 milliGRAM(s) Oral daily  warfarin 5 milliGRAM(s) Oral once    MEDICATIONS  (PRN):  acetaminophen   Tablet .. 650 milliGRAM(s) Oral every 6 hours PRN Temp greater or equal to 38C (100.4F)  dextrose 40% Gel 15 Gram(s) Oral once PRN Blood Glucose LESS THAN 70 milliGRAM(s)/deciliter  dicyclomine 20 milliGRAM(s) Oral daily PRN abdominal pain  glucagon  Injectable 1 milliGRAM(s) IntraMuscular once PRN Glucose LESS THAN 70 milligrams/deciliter  morphine  - Injectable 1 milliGRAM(s) IV Push every 4 hours PRN Moderate Pain (4 - 6)  zolpidem 5 milliGRAM(s) Oral at bedtime PRN Insomnia      Allergies    penicillin (Hives)    Intolerances        Vital Signs Last 24 Hrs  T(C): 36.6 (2018 10:35), Max: 37.7 (2018 01:00)  T(F): 97.9 (2018 10:35), Max: 99.9 (2018 01:00)  HR: 104 (2018 17:55) (90 - 121)  BP: 121/55 (2018 17:55) (98/52 - 152/51)  BP(mean): --  RR: 20 (2018 10:35) (16 - 20)  SpO2: 94% (2018 10:35) (94% - 98%)    I&O's Summary    2018 07:01  -  2018 07:00  --------------------------------------------------------  IN: 2500 mL / OUT: 300 mL / NET: 2200 mL        Physical Exam:  General: NAD  Respiratory: Nonlabored  Cardiovascular: RRR  Abdominal: Soft, nondistended, nontender  Extremities: Warm, right leg erythema, ulceration, minimal drainage.      LABS:                        11.4   15.91 )-----------( 289      ( 2018 05:25 )             35.7     11-14    140  |  106  |  8   ----------------------------<  123<H>  3.1<L>   |  28  |  0.47<L>    Ca    7.8<L>      2018 05:25    TPro  7.2  /  Alb  2.7<L>  /  TBili  0.7  /  DBili  x   /  AST  44<H>  /  ALT  46  /  AlkPhos  71  11    PT/INR - ( 2018 05:25 )   PT: 27.1 sec;   INR: 2.38 ratio         PTT - ( 2018 16:00 )  PTT:39.1 sec    Urinalysis Basic - ( 2018 19:35 )    Color: Yellow / Appearance: Clear / S.005 / pH: x  Gluc: x / Ketone: Negative  / Bili: Negative / Urobili: Negative mg/dL   Blood: x / Protein: 15 mg/dL / Nitrite: Positive   Leuk Esterase: Small / RBC: 3-5 /HPF / WBC 11-25   Sq Epi: x / Non Sq Epi: Moderate / Bacteria: Moderate        IMAGING:    Assessment/Plan: 68yFemale presents with recurrent cellulitis and non-healing ulcer. Will order arterial duplex to assess circulation. Bed rest with leg elevation. Continue wound care. Will follow.    -Collagnease and xeroform to calf wound  -Ace bandage  -Pain control  -Leg elevation  -Arterial duplex  -Will follow with you

## 2018-11-15 LAB
ADD ON TEST-SPECIMEN IN LAB: SIGNIFICANT CHANGE UP
ANION GAP SERPL CALC-SCNC: 7 MMOL/L — SIGNIFICANT CHANGE UP (ref 5–17)
BUN SERPL-MCNC: 6 MG/DL — LOW (ref 7–23)
CALCIUM SERPL-MCNC: 8.2 MG/DL — LOW (ref 8.5–10.1)
CHLORIDE SERPL-SCNC: 106 MMOL/L — SIGNIFICANT CHANGE UP (ref 96–108)
CO2 SERPL-SCNC: 27 MMOL/L — SIGNIFICANT CHANGE UP (ref 22–31)
CREAT SERPL-MCNC: 0.46 MG/DL — LOW (ref 0.5–1.3)
GLUCOSE BLDC GLUCOMTR-MCNC: 155 MG/DL — HIGH (ref 70–99)
GLUCOSE BLDC GLUCOMTR-MCNC: 171 MG/DL — HIGH (ref 70–99)
GLUCOSE BLDC GLUCOMTR-MCNC: 173 MG/DL — HIGH (ref 70–99)
GLUCOSE BLDC GLUCOMTR-MCNC: 193 MG/DL — HIGH (ref 70–99)
GLUCOSE SERPL-MCNC: 125 MG/DL — HIGH (ref 70–99)
HCT VFR BLD CALC: 36.5 % — SIGNIFICANT CHANGE UP (ref 34.5–45)
HGB BLD-MCNC: 11.6 G/DL — SIGNIFICANT CHANGE UP (ref 11.5–15.5)
INR BLD: 1.54 RATIO — HIGH (ref 0.88–1.16)
MAGNESIUM SERPL-MCNC: 1.6 MG/DL — SIGNIFICANT CHANGE UP (ref 1.6–2.6)
MCHC RBC-ENTMCNC: 30.2 PG — SIGNIFICANT CHANGE UP (ref 27–34)
MCHC RBC-ENTMCNC: 31.8 GM/DL — LOW (ref 32–36)
MCV RBC AUTO: 95.1 FL — SIGNIFICANT CHANGE UP (ref 80–100)
NRBC # BLD: 0 /100 WBCS — SIGNIFICANT CHANGE UP (ref 0–0)
PHOSPHATE SERPL-MCNC: 3.1 MG/DL — SIGNIFICANT CHANGE UP (ref 2.5–4.5)
PLATELET # BLD AUTO: 285 K/UL — SIGNIFICANT CHANGE UP (ref 150–400)
POTASSIUM SERPL-MCNC: 3.4 MMOL/L — LOW (ref 3.5–5.3)
POTASSIUM SERPL-SCNC: 3.4 MMOL/L — LOW (ref 3.5–5.3)
PROTHROM AB SERPL-ACNC: 17.3 SEC — HIGH (ref 10–12.9)
RBC # BLD: 3.84 M/UL — SIGNIFICANT CHANGE UP (ref 3.8–5.2)
RBC # FLD: 14.1 % — SIGNIFICANT CHANGE UP (ref 10.3–14.5)
SODIUM SERPL-SCNC: 140 MMOL/L — SIGNIFICANT CHANGE UP (ref 135–145)
WBC # BLD: 9.68 K/UL — SIGNIFICANT CHANGE UP (ref 3.8–10.5)
WBC # FLD AUTO: 9.68 K/UL — SIGNIFICANT CHANGE UP (ref 3.8–10.5)

## 2018-11-15 PROCEDURE — 93306 TTE W/DOPPLER COMPLETE: CPT | Mod: 26

## 2018-11-15 PROCEDURE — 93926 LOWER EXTREMITY STUDY: CPT | Mod: 26,RT

## 2018-11-15 RX ORDER — WARFARIN SODIUM 2.5 MG/1
6 TABLET ORAL ONCE
Qty: 0 | Refills: 0 | Status: COMPLETED | OUTPATIENT
Start: 2018-11-15 | End: 2018-11-15

## 2018-11-15 RX ORDER — DOCUSATE SODIUM 100 MG
100 CAPSULE ORAL DAILY
Qty: 0 | Refills: 0 | Status: DISCONTINUED | OUTPATIENT
Start: 2018-11-15 | End: 2018-11-20

## 2018-11-15 RX ORDER — IPRATROPIUM/ALBUTEROL SULFATE 18-103MCG
3 AEROSOL WITH ADAPTER (GRAM) INHALATION EVERY 6 HOURS
Qty: 0 | Refills: 0 | Status: DISCONTINUED | OUTPATIENT
Start: 2018-11-15 | End: 2018-11-16

## 2018-11-15 RX ORDER — POTASSIUM CHLORIDE 20 MEQ
40 PACKET (EA) ORAL ONCE
Qty: 0 | Refills: 0 | Status: COMPLETED | OUTPATIENT
Start: 2018-11-15 | End: 2018-11-15

## 2018-11-15 RX ORDER — FUROSEMIDE 40 MG
40 TABLET ORAL EVERY 12 HOURS
Qty: 0 | Refills: 0 | Status: DISCONTINUED | OUTPATIENT
Start: 2018-11-15 | End: 2018-11-15

## 2018-11-15 RX ORDER — FUROSEMIDE 40 MG
40 TABLET ORAL ONCE
Qty: 0 | Refills: 0 | Status: COMPLETED | OUTPATIENT
Start: 2018-11-15 | End: 2018-11-15

## 2018-11-15 RX ORDER — FUROSEMIDE 40 MG
80 TABLET ORAL EVERY 12 HOURS
Qty: 0 | Refills: 0 | Status: DISCONTINUED | OUTPATIENT
Start: 2018-11-15 | End: 2018-11-20

## 2018-11-15 RX ORDER — POTASSIUM CHLORIDE 20 MEQ
20 PACKET (EA) ORAL EVERY 12 HOURS
Qty: 0 | Refills: 0 | Status: DISCONTINUED | OUTPATIENT
Start: 2018-11-15 | End: 2018-11-16

## 2018-11-15 RX ADMIN — GABAPENTIN 300 MILLIGRAM(S): 400 CAPSULE ORAL at 21:11

## 2018-11-15 RX ADMIN — CEFEPIME 100 MILLIGRAM(S): 1 INJECTION, POWDER, FOR SOLUTION INTRAMUSCULAR; INTRAVENOUS at 17:50

## 2018-11-15 RX ADMIN — Medication 1: at 17:48

## 2018-11-15 RX ADMIN — MORPHINE SULFATE 1 MILLIGRAM(S): 50 CAPSULE, EXTENDED RELEASE ORAL at 09:49

## 2018-11-15 RX ADMIN — Medication 325 MILLIGRAM(S): at 11:17

## 2018-11-15 RX ADMIN — ZOLPIDEM TARTRATE 5 MILLIGRAM(S): 10 TABLET ORAL at 23:48

## 2018-11-15 RX ADMIN — CEFEPIME 100 MILLIGRAM(S): 1 INJECTION, POWDER, FOR SOLUTION INTRAMUSCULAR; INTRAVENOUS at 05:34

## 2018-11-15 RX ADMIN — ATORVASTATIN CALCIUM 10 MILLIGRAM(S): 80 TABLET, FILM COATED ORAL at 21:11

## 2018-11-15 RX ADMIN — Medication 80 MILLIGRAM(S): at 17:49

## 2018-11-15 RX ADMIN — Medication 110 MILLIGRAM(S): at 05:34

## 2018-11-15 RX ADMIN — MORPHINE SULFATE 1 MILLIGRAM(S): 50 CAPSULE, EXTENDED RELEASE ORAL at 21:29

## 2018-11-15 RX ADMIN — Medication 1: at 07:51

## 2018-11-15 RX ADMIN — Medication 1: at 11:17

## 2018-11-15 RX ADMIN — Medication 81 MILLIGRAM(S): at 11:17

## 2018-11-15 RX ADMIN — Medication 40 MILLIGRAM(S): at 05:37

## 2018-11-15 RX ADMIN — WARFARIN SODIUM 6 MILLIGRAM(S): 2.5 TABLET ORAL at 21:11

## 2018-11-15 RX ADMIN — Medication 100 MILLIGRAM(S): at 05:33

## 2018-11-15 RX ADMIN — Medication 5 MILLIGRAM(S): at 21:11

## 2018-11-15 RX ADMIN — HEPARIN SODIUM 5000 UNIT(S): 5000 INJECTION INTRAVENOUS; SUBCUTANEOUS at 17:51

## 2018-11-15 RX ADMIN — Medication 100 MILLIGRAM(S): at 17:49

## 2018-11-15 RX ADMIN — Medication 20 MILLIEQUIVALENT(S): at 17:51

## 2018-11-15 RX ADMIN — Medication 110 MILLIGRAM(S): at 17:50

## 2018-11-15 RX ADMIN — Medication 3 MILLILITER(S): at 05:43

## 2018-11-15 RX ADMIN — MORPHINE SULFATE 1 MILLIGRAM(S): 50 CAPSULE, EXTENDED RELEASE ORAL at 10:00

## 2018-11-15 RX ADMIN — Medication 3 MILLILITER(S): at 18:49

## 2018-11-15 RX ADMIN — Medication 40 MILLIEQUIVALENT(S): at 09:21

## 2018-11-15 RX ADMIN — MONTELUKAST 10 MILLIGRAM(S): 4 TABLET, CHEWABLE ORAL at 21:11

## 2018-11-15 RX ADMIN — HEPARIN SODIUM 5000 UNIT(S): 5000 INJECTION INTRAVENOUS; SUBCUTANEOUS at 05:34

## 2018-11-15 RX ADMIN — MORPHINE SULFATE 1 MILLIGRAM(S): 50 CAPSULE, EXTENDED RELEASE ORAL at 23:37

## 2018-11-15 NOTE — PROGRESS NOTE ADULT - ASSESSMENT
68 year old woman with the above history admitted with recurrent LE cellulitis.  Her rapid rates related to her atrial fibrillation and HFpEF are probably secondary to the underlying infection.  Her rates are already improved compared to admission.  Will continue her current medications.  If her rates increase we can increase her metoprolol to Q 12 hours.    11/15/18:  HFpEF/weight gain while in the hospital.  Will change PO furosemide to IV and increase the dose.  KCL as well.

## 2018-11-15 NOTE — PROGRESS NOTE ADULT - ASSESSMENT
68y F PMHx of DM, CHF, Venous Insufficiency, Ulcerative Colitis, HTN, HLD admitted 11/13/18, with fevers starting the night before admission and RLE pain. Patient with chronic ulcer of the posterior calf of the RLE, has been causing recurrent cellulitis with multiple hospital admissions since 8/18, patient admits that in the past couple of days her leg has been having increased swelling, erythema and pain. Pain is non-radiating constant severe burning pain with no alleviation to NSAIDs and Percocet. Patient admits that the pain is associated with chills, nausea with heaving no vomiting, as well as shortness of breath and palpitations, found to be in Afib RVR in the ED with elevated LA/wbc ct 23, was given IV vancomycin/cefepime for cellulitis.     1. sepsis/chronic b/l LE stasis dermatitis/superimposed RLE cellulitis/PVD/obesity  - slowly improving  - on cefepime [id number] for gnr resistant coverage #2  - on doxy 100mg BID for mrsa coverage #2  - continue with abx coverage  - cx no growth  - wound care/vascular eval  - monitor temps  - tolerating abx well so far; no side effects noted  - reason for abx use and side effects reviewed with patient  - supportive care  - f/u cbc    2. other issues - care per medicine

## 2018-11-15 NOTE — PROGRESS NOTE ADULT - SUBJECTIVE AND OBJECTIVE BOX
Seen at bedside  She reports that her feet hurt less  No fever or chills    Vital Signs Last 24 Hrs  T(C): 37.2 (15 Nov 2018 05:29), Max: 37.2 (15 Nov 2018 05:29)  T(F): 99 (15 Nov 2018 05:29), Max: 99 (15 Nov 2018 05:29)  HR: 108 (15 Nov 2018 05:51) (83 - 109)  BP: 156/89 (15 Nov 2018 05:29) (107/51 - 156/89)  BP(mean): --  RR: 20 (15 Nov 2018 05:29) (18 - 20)  SpO2: 92% (15 Nov 2018 05:51) (92% - 95%)    Exam  Well appearing, NAD  Abd soft ND NT  Bilateral lower extremity chronic venous stasis changes with active RLE venous ulceration and cellulitis  Non palpable pedal pulses                          11.6   9.68  )-----------( 285      ( 15 Nov 2018 05:43 )             36.5

## 2018-11-15 NOTE — PROGRESS NOTE ADULT - SUBJECTIVE AND OBJECTIVE BOX
CHIEF COMPLAINT: Patient is a 68y old  Female who presents with a chief complaint of Fevers (13 Nov 2018 23:01)      HPI: HPI:  Patient is 68y F PMHx of DM, CHF, Afib, HFpEF, Venous Insufficiency, Ulcerative Colitis, HTN, HLD, presents to [facility] on 11/13/18, with fevers starting the night before admission and RLE pain. Patient with chronic ulcer of the posterior calf of the RLE, has been causing recurrent cellulitis with multiple hospital admissions since 8/18, patient admits that in the past couple of days her leg has been having increased swelling, erythema and pain. Pain is non-radiating constant severe burning pain with no alleviation to NSAIDs and Percocet. Patient admits that the pain is associated with chills, nausea with heaving no vomiting, as well as shortness of breath and palpitations, found to be in Afib RVR in the ED. Patient also admits some diarrhea but is associated with her UC. (13 Nov 2018 23:01)    11/15/18:  Patient required IV furosemide early this am      PMHx: PAST MEDICAL & SURGICAL HISTORY:  HTN (hypertension)  Thyroid nodule  Peripheral venous insufficiency  Neuropathy  Morbid obesity with BMI of 40.0-44.9, adult  Dyspnea  Congestive heart failure/HFpEF  Atrial fibrillation  Diabetes mellitus type II, controlled  Status post medial meniscus repair  S/P left knee arthroscopy  Other complications of gastric band procedure  H/O colonoscopy  History of esophagogastroduodenoscopy (EGD)        Soc Hx:     FAMILY HISTORY:  Family history of diabetes mellitus in mother (Mother)  Family history of breast cancer in mother (Mother)      Allergies: Allergies    penicillin (Hives)    Intolerances          REVIEW OF SYSTEMS:    As above  No chest pain + shortness of breath  No lightheadeness or syncope  + leg swelling  No palpitations  No claudication-like symptoms    ICU Vital Signs Last 24 Hrs  T(C): 37.2 (15 Nov 2018 05:29), Max: 37.2 (15 Nov 2018 05:29)  T(F): 99 (15 Nov 2018 05:29), Max: 99 (15 Nov 2018 05:29)  HR: 108 (15 Nov 2018 05:51) (83 - 109)  BP: 156/89 (15 Nov 2018 05:29) (107/51 - 156/89)  BP(mean): --  ABP: --  ABP(mean): --  RR: 20 (15 Nov 2018 05:29) (18 - 20)  SpO2: 92% (15 Nov 2018 05:51) (92% - 95%)      I&O's Summary    13 Nov 2018 07:01  -  14 Nov 2018 07:00  --------------------------------------------------------  IN: 2500 mL / OUT: 300 mL / NET: 2200 mL        CAPILLARY BLOOD GLUCOSE      POCT Blood Glucose.: 124 mg/dL (14 Nov 2018 08:01)      PHYSICAL EXAM:   Patient in NAD  Neck: No JVD; Carotids:  2+ without bruits  Respiratory:  Clear to A&P  Cardiovascular: S1 and S2, regular rate and rhythm, no Murmurs, gallops or rubs  Gastrointestinal:  Soft, non-tender; BS positive  Extremities: Bilateral chronic venous stasis changes/edema with superimposed LLE cellulitis  Vascular: 2+ peripheral pulses  Neurological: A/O x 3, no focal deficits      MEDICATIONS  (STANDING):  ALBUTerol/ipratropium for Nebulization 3 milliLiter(s) Nebulizer every 6 hours  aspirin  chewable 81 milliGRAM(s) Oral daily  atorvastatin 10 milliGRAM(s) Oral at bedtime  cefepime   IVPB 2000 milliGRAM(s) IV Intermittent every 12 hours  dextrose 5%. 1000 milliLiter(s) (50 mL/Hr) IV Continuous <Continuous>  dextrose 50% Injectable 12.5 Gram(s) IV Push once  dextrose 50% Injectable 25 Gram(s) IV Push once  dextrose 50% Injectable 25 Gram(s) IV Push once  diltiazem    Tablet 120 milliGRAM(s) Oral two times a day  doxycycline IVPB 100 milliGRAM(s) IV Intermittent every 12 hours  doxycycline IVPB      ferrous    sulfate 325 milliGRAM(s) Oral daily  furosemide    Tablet 40 milliGRAM(s) Oral every 12 hours  gabapentin 300 milliGRAM(s) Oral at bedtime  heparin  Injectable 5000 Unit(s) SubCutaneous every 12 hours  insulin lispro (HumaLOG) corrective regimen sliding scale   SubCutaneous three times a day before meals  insulin lispro (HumaLOG) corrective regimen sliding scale   SubCutaneous at bedtime  metoprolol succinate  milliGRAM(s) Oral daily  montelukast 10 milliGRAM(s) Oral at bedtime  morphine  - Injectable 4 milliGRAM(s) IV Push once  potassium chloride   Powder 40 milliEquivalent(s) Oral once  Uceris (Budesonide EC) 9 milliGRAM(s) 9 milliGRAM(s) Oral daily      Home Medications:  aspirin 81 mg oral tablet: 1 tab(s) orally once a day (13 Nov 2018 23:12)  dicyclomine 20 mg oral tablet: 1 tab(s) orally once a day, As Needed (13 Nov 2018 23:12)  dilTIAZem 120 mg oral tablet: 1 tab(s) orally 2 times a day (13 Nov 2018 23:12)  ferrous sulfate 250 mg oral capsule, extended release: 1 cap(s) orally once a day (13 Nov 2018 23:12)  furosemide 40 mg oral tablet: 1 tab(s) orally 2 times a day (13 Nov 2018 23:12)  gabapentin 300 mg oral capsule: 1 cap(s) orally once a day (at bedtime) (13 Nov 2018 23:12)  metFORMIN 1000 mg oral tablet: 1 tab(s) orally 2 times a day (13 Nov 2018 23:12)  Metoprolol Succinate  mg oral tablet, extended release: 1 tab(s) orally once a day (13 Nov 2018 23:12)  montelukast 10 mg oral tablet: 1 tab(s) orally once a day (at bedtime) (13 Nov 2018 23:12)  potassium chloride 20 mEq oral tablet, extended release: 1 tab(s) orally 2 times a day (13 Nov 2018 23:12)  pravastatin 10 mg oral tablet: 1 tab(s) orally once a day (13 Nov 2018 23:12)  Uceris 9 mg oral tablet, extended release: 1 tab(s) orally once a day (in the morning) (13 Nov 2018 23:12)  warfarin 5 mg oral tablet: 1 tab(s) orally once a day (13 Nov 2018 23:12)  zolpidem 10 mg oral tablet: 1 tab(s) orally once a day (at bedtime), As Needed (13 Nov 2018 23:12)        LABS: All Labs Reviewed:  Blood Culture:   BNP Serum Pro-Brain Natriuretic Peptide: 1965 pg/mL (11-13 @ 16:33)  Serum Pro-Brain Natriuretic Peptide: 2002 pg/mL (11-13 @ 16:00)    CBC             WBC Count: 15.91 K/uL (11-14 @ 05:25)  WBC Count: 23.88 K/uL (11-13 @ 16:00)              Hemoglobin: 11.4 g/dL (11-14 @ 05:25)  Hemoglobin: 13.3 g/dL (11-13 @ 16:00)              Hematocrit: 35.7 % (11-14 @ 05:25)  Hematocrit: 41.0 % (11-13 @ 16:00)              Mean Cell Volume: 97.0 fl (11-14 @ 05:25)  Mean Cell Volume: 93.2 fl (11-13 @ 16:00)              Platelet Count - Automated: 289 K/uL (11-14 @ 05:25)  Platelet Count - Automated: 373 K/uL (11-13 @ 16:00)                        11.6   9.68  )-----------( 285      ( 15 Nov 2018 05:43 )             36.5                               Cardiac markers             Troponin I, Serum: <0.015 ng/mL (11-13 @ 20:23)  Troponin I, Serum: <0.015 ng/mL (11-13 @ 16:33)  Troponin I, Serum: <0.015 ng/mL (11-13 @ 16:00)                             Chems        Sodium, Serum: 140 mmol/L (11-14 @ 05:25)  Sodium, Serum: 137 mmol/L (11-13 @ 16:00)          Potassium, Serum: 3.1 mmol/L (11-14 @ 05:25)  Potassium, Serum: 3.5 mmol/L (11-13 @ 16:00)          Blood Urea Nitrogen, Serum: 8 mg/dL (11-14 @ 05:25)  Blood Urea Nitrogen, Serum: 12 mg/dL (11-13 @ 16:00)          Creatinine 0.47  Creatinine 0.76                  Protein Total, Serum: 7.2 gm/dL (11-13 @ 16:00)                  Calcium, Total Serum: 7.8 mg/dL (11-14 @ 05:25)  Calcium, Total Serum: 8.6 mg/dL (11-13 @ 16:00)                  Bilirubin Total, Serum: 0.7 mg/dL (11-13 @ 16:00)          Alanine Aminotransferase (ALT/SGPT): 46 U/L (11-13 @ 16:00)          Aspartate Aminotransferase (AST/SGOT): 44 U/L (11-13 @ 16:00)    11-15    140  |  106  |  6<L>  ----------------------------<  125<H>  3.4<L>   |  27  |  0.46<L>    Ca    8.2<L>      15 Nov 2018 05:43  Phos  3.1     11-15  Mg     1.6     11-15    TPro  7.2  /  Alb  2.7<L>  /  TBili  0.7  /  DBili  x   /  AST  44<H>  /  ALT  46  /  AlkPhos  71  11-13                   INR: 2.38 ratio (11-14 @ 05:25)  INR: 2.61 ratio (11-13 @ 16:00)             RADIOLOGY:    EKG:  Atrial fibrillation with a rapid VR; PRWP    Telemetry:  Atrial fibrillation with a controlled VR    ECHO:

## 2018-11-15 NOTE — PROGRESS NOTE ADULT - ASSESSMENT
Assessment/Plan: 68yFemale presents with recurrent cellulitis and non-healing ulcer.   -Bilateral LE compression dressing with collagenase and Ace bandage  -Pain control  -Leg elevation  -Arterial duplex  -Will follow with you

## 2018-11-15 NOTE — PROGRESS NOTE ADULT - SUBJECTIVE AND OBJECTIVE BOX
Date of service: 11-15-18 @ 12:02    pt seen and examined  no fevers  RLE less swollen, redness slightly better    ROS: no fever or chills; denies dizziness, no HA, no SOB or cough, no abdominal pain, no diarrhea or constipation; no dysuria, no urinary frequency, no legs pain, no rashes    MEDICATIONS  (STANDING):  ALBUTerol/ipratropium for Nebulization 3 milliLiter(s) Nebulizer every 6 hours  aspirin  chewable 81 milliGRAM(s) Oral daily  atorvastatin 10 milliGRAM(s) Oral at bedtime  cefepime   IVPB 2000 milliGRAM(s) IV Intermittent every 12 hours  dextrose 5%. 1000 milliLiter(s) (50 mL/Hr) IV Continuous <Continuous>  dextrose 50% Injectable 12.5 Gram(s) IV Push once  dextrose 50% Injectable 25 Gram(s) IV Push once  dextrose 50% Injectable 25 Gram(s) IV Push once  diltiazem    Tablet 120 milliGRAM(s) Oral two times a day  doxycycline IVPB 100 milliGRAM(s) IV Intermittent every 12 hours  doxycycline IVPB      ferrous    sulfate 325 milliGRAM(s) Oral daily  furosemide   Injectable 80 milliGRAM(s) IV Push every 12 hours  gabapentin 300 milliGRAM(s) Oral at bedtime  heparin  Injectable 5000 Unit(s) SubCutaneous every 12 hours  insulin lispro (HumaLOG) corrective regimen sliding scale   SubCutaneous three times a day before meals  insulin lispro (HumaLOG) corrective regimen sliding scale   SubCutaneous at bedtime  metoprolol succinate  milliGRAM(s) Oral daily  montelukast 10 milliGRAM(s) Oral at bedtime  morphine  - Injectable 4 milliGRAM(s) IV Push once  potassium chloride    Tablet ER 20 milliEquivalent(s) Oral every 12 hours  Uceris (Budesonide EC) 9 milliGRAM(s) 9 milliGRAM(s) Oral daily      Vital Signs Last 24 Hrs  T(C): 37.4 (15 Nov 2018 10:45), Max: 37.4 (15 Nov 2018 10:45)  T(F): 99.4 (15 Nov 2018 10:45), Max: 99.4 (15 Nov 2018 10:45)  HR: 91 (15 Nov 2018 10:45) (83 - 109)  BP: 113/52 (15 Nov 2018 10:45) (113/52 - 156/89)  BP(mean): --  RR: 18 (15 Nov 2018 10:45) (18 - 20)  SpO2: 96% (15 Nov 2018 10:45) (92% - 96%)          PE:  Constitutional: frail looking  HEENT: NC/AT, EOMI, PERRLA, conjunctivae clear; ears and nose atraumatic; pharynx benign  Neck: supple; thyroid not palpable  Back: no tenderness  Respiratory: respiratory effort normal; clear to auscultation  Cardiovascular: S1S2 regular, no murmurs  Abdomen: soft, not tender, not distended, positive BS; liver and spleen WNL  Genitourinary: no suprapubic tenderness  Lymphatic: no LN palpable  Musculoskeletal: no muscle tenderness, no joint swelling or tenderness  Extremities:  pedal/b/l 2-3 + edema  Neurological/ Psychiatric: Judgement and insight normal;  moving all extremities  Skin: LLE stasis dermatitis changes, no erythema Right calf ulcer w/ discharge surrounding erythema/edema/warmth no palpable lesions    Labs: all available labs reviewed                                   11.6   9.68  )-----------( 285      ( 15 Nov 2018 05:43 )             36.5     11-15    140  |  106  |  6<L>  ----------------------------<  125<H>  3.4<L>   |  27  |  0.46<L>    Ca    8.2<L>      15 Nov 2018 05:43  Phos  3.1     11-15  Mg     1.6     11-15    TPro  7.2  /  Alb  2.7<L>  /  TBili  0.7  /  DBili  x   /  AST  44<H>  /  ALT  46  /  AlkPhos  71  11-13           Cultures:   Culture - Urine (11.13.18 @ 19:35)    Specimen Source: .Urine Clean Catch (Midstream)    Culture Results:   No growth    Culture - Blood (11.13.18 @ 16:00)    Specimen Source: .Blood None    Culture Results:   No growth to date.          Radiology: all available radiological tests reviewed  < from: CT Lower Extremity w/ IV Cont, Right (11.13.18 @ 18:20) >    EXAM:  CT LWR EXT IC RT                            PROCEDURE DATE:  11/13/2018          INTERPRETATION:  [facility] RADIOLOGIST PRELIMINARY REPORT    EXAM:    CT Right Lower Extremity With IV Contrast, Tibia and Fibula     EXAM DATE/TIME:    11/13/2018 6:31 PM     CLINICAL HISTORY:    68 years old, female; Pain; Lower leg; Right     TECHNIQUE:    CT of the Right lower extremity with intravenous contrast was performed.   Exam   focused on the tibia and fibula.    Coronal and sagittal reformatted images were created and reviewed.     COMPARISON:    CR XR LOWER EXTREMITY RIGHT 11/13/2018 5:45 PM     FINDINGS:    Bones/joints:  Severe tricompartment degenerative changes of the knee.   Small   partially visualized the patella joint effusion with calcifications.   Degenerative changes in the foot. Old nonunited fractures in the medial   malleolus. No acute bony abnormality identified. No obvious bony   destructive   changes. Calcaneal spurs.    Soft tissues:  Extensive diffuse soft tissue edema andskin thickening.   No   loculated fluid collection. Possible skin ulceration in the posterior   calf.    Vasculature:  Arteriosclerosis.     IMPRESSION:   1. Extensive diffuse soft tissue edema and skin thickening. No loculated   fluid   collection. Possible skin ulceration in the posterior calf.   2. No acute bony abnormality or obvious bony destructive changes.   3. Degenerative changes of the bones.   4. Arteriosclerosis.   5. Other findings as described above.    CT LWR EXT IC RT dated 11/13/2018 6:20 PM     INDICATION: Right lower leg pain and swelling with wound.    COMPARISON: Ankle radiographs dated 11/13/2018    TECHNIQUE: CT imaging of the right lower extremity was performed with   contrast. The data was reformatted in the axial, coronal, and sagittal   planes.   Contrast: Omnipaque 350. Administered: 150 cc per discarded: 50 cc.    FINDINGS:    OSSEOUS STRUCTURES: There is no cortical erosion or destruction to   suggest osteomyelitis. No periosteal reaction is seen. There is severe   osteoarthritis of the knee with marked joint space narrowing the   patellofemoral compartment. Severe narrowing of the medial knee joint   space. Subchondral sclerosis and cystic changes are noted. There are   degenerative changes at the midfoot joint with moderate to severe   narrowing at the second through fourth TMT's. There are subchondral   cystic changes at the second TMT.  SYNOVIUM/ JOINT FLUID: There is partially imaged joint effusion at the   knee.  TENDONS: The tendons are intact.No full-thickness tendon tear or   retraction is noted.  MUSCLES: There is no intramuscular hematoma. There is a small   intramuscular lipoma involving the medial gastrocnemius muscle inferiorly   measuring up to 1.6 cm.  NEUROVASCULAR STRUCTURES: There is moderate to severe vascular   calcification present.  SUBCUTANEOUS SOFT TISSUES: There is circumferential soft tissue swelling   about the calf. There is mild associated skin thickening. There is a   small suspected skin defect at the posterioraspect of the lower calf   measuring up to 1.4 cm. Skin irregularity is noted along the dorsum of   the midfoot and forefoot with overlying bandage. A focal confluent area   of hypodensity is appreciated overlying the base of the first metatarsal   measuring approximately 1.0 x 1.0 x 3.1 cm (series 2, image 214).      IMPRESSION:    1.  Circumferential soft tissue swelling about the calf with extension   into the foot. Nonspecific but can be seen in the setting of cellulitis.  2.  Focal area of hypodensity in the soft tissues over the base of the   first metatarsal concerning for phlegmonous change versus early abscess   formation.  3.  No CT evidence for osteomyelitis. If concern persists, MRI or bone   scan can be performed.    < end of copied text >    Advanced directives addressed: full resuscitation

## 2018-11-16 LAB
ADD ON TEST-SPECIMEN IN LAB: SIGNIFICANT CHANGE UP
ANION GAP SERPL CALC-SCNC: 7 MMOL/L — SIGNIFICANT CHANGE UP (ref 5–17)
APTT BLD: 33.5 SEC — SIGNIFICANT CHANGE UP (ref 27.5–36.3)
BUN SERPL-MCNC: 11 MG/DL — SIGNIFICANT CHANGE UP (ref 7–23)
CALCIUM SERPL-MCNC: 8.2 MG/DL — LOW (ref 8.5–10.1)
CHLORIDE SERPL-SCNC: 107 MMOL/L — SIGNIFICANT CHANGE UP (ref 96–108)
CO2 SERPL-SCNC: 28 MMOL/L — SIGNIFICANT CHANGE UP (ref 22–31)
CREAT SERPL-MCNC: 0.39 MG/DL — LOW (ref 0.5–1.3)
GLUCOSE BLDC GLUCOMTR-MCNC: 132 MG/DL — HIGH (ref 70–99)
GLUCOSE BLDC GLUCOMTR-MCNC: 157 MG/DL — HIGH (ref 70–99)
GLUCOSE BLDC GLUCOMTR-MCNC: 194 MG/DL — HIGH (ref 70–99)
GLUCOSE BLDC GLUCOMTR-MCNC: 197 MG/DL — HIGH (ref 70–99)
GLUCOSE SERPL-MCNC: 135 MG/DL — HIGH (ref 70–99)
HCT VFR BLD CALC: 35 % — SIGNIFICANT CHANGE UP (ref 34.5–45)
HGB BLD-MCNC: 11.3 G/DL — LOW (ref 11.5–15.5)
INR BLD: 1.45 RATIO — HIGH (ref 0.88–1.16)
MAGNESIUM SERPL-MCNC: 1.8 MG/DL — SIGNIFICANT CHANGE UP (ref 1.6–2.6)
MCHC RBC-ENTMCNC: 30.6 PG — SIGNIFICANT CHANGE UP (ref 27–34)
MCHC RBC-ENTMCNC: 32.3 GM/DL — SIGNIFICANT CHANGE UP (ref 32–36)
MCV RBC AUTO: 94.9 FL — SIGNIFICANT CHANGE UP (ref 80–100)
NRBC # BLD: 0 /100 WBCS — SIGNIFICANT CHANGE UP (ref 0–0)
PHOSPHATE SERPL-MCNC: 3.3 MG/DL — SIGNIFICANT CHANGE UP (ref 2.5–4.5)
PLATELET # BLD AUTO: 313 K/UL — SIGNIFICANT CHANGE UP (ref 150–400)
POTASSIUM SERPL-MCNC: 3.2 MMOL/L — LOW (ref 3.5–5.3)
POTASSIUM SERPL-SCNC: 3.2 MMOL/L — LOW (ref 3.5–5.3)
PROTHROM AB SERPL-ACNC: 16.3 SEC — HIGH (ref 10–12.9)
RBC # BLD: 3.69 M/UL — LOW (ref 3.8–5.2)
RBC # FLD: 13.7 % — SIGNIFICANT CHANGE UP (ref 10.3–14.5)
SODIUM SERPL-SCNC: 142 MMOL/L — SIGNIFICANT CHANGE UP (ref 135–145)
WBC # BLD: 8.39 K/UL — SIGNIFICANT CHANGE UP (ref 3.8–10.5)
WBC # FLD AUTO: 8.39 K/UL — SIGNIFICANT CHANGE UP (ref 3.8–10.5)

## 2018-11-16 RX ORDER — GABAPENTIN 400 MG/1
300 CAPSULE ORAL
Qty: 0 | Refills: 0 | Status: DISCONTINUED | OUTPATIENT
Start: 2018-11-16 | End: 2018-11-16

## 2018-11-16 RX ORDER — IPRATROPIUM/ALBUTEROL SULFATE 18-103MCG
3 AEROSOL WITH ADAPTER (GRAM) INHALATION EVERY 6 HOURS
Qty: 0 | Refills: 0 | Status: DISCONTINUED | OUTPATIENT
Start: 2018-11-16 | End: 2018-11-20

## 2018-11-16 RX ORDER — POTASSIUM CHLORIDE 20 MEQ
40 PACKET (EA) ORAL ONCE
Qty: 0 | Refills: 0 | Status: DISCONTINUED | OUTPATIENT
Start: 2018-11-16 | End: 2018-11-16

## 2018-11-16 RX ORDER — POTASSIUM CHLORIDE 20 MEQ
40 PACKET (EA) ORAL
Qty: 0 | Refills: 0 | Status: DISCONTINUED | OUTPATIENT
Start: 2018-11-16 | End: 2018-11-20

## 2018-11-16 RX ORDER — WARFARIN SODIUM 2.5 MG/1
6 TABLET ORAL DAILY
Qty: 0 | Refills: 0 | Status: DISCONTINUED | OUTPATIENT
Start: 2018-11-16 | End: 2018-11-20

## 2018-11-16 RX ORDER — POTASSIUM CHLORIDE 20 MEQ
40 PACKET (EA) ORAL ONCE
Qty: 0 | Refills: 0 | Status: COMPLETED | OUTPATIENT
Start: 2018-11-16 | End: 2018-11-16

## 2018-11-16 RX ORDER — COLLAGENASE CLOSTRIDIUM HIST. 250 UNIT/G
1 OINTMENT (GRAM) TOPICAL DAILY
Qty: 0 | Refills: 0 | Status: DISCONTINUED | OUTPATIENT
Start: 2018-11-16 | End: 2018-11-20

## 2018-11-16 RX ORDER — GABAPENTIN 400 MG/1
300 CAPSULE ORAL THREE TIMES A DAY
Qty: 0 | Refills: 0 | Status: DISCONTINUED | OUTPATIENT
Start: 2018-11-16 | End: 2018-11-20

## 2018-11-16 RX ADMIN — Medication 1: at 08:33

## 2018-11-16 RX ADMIN — Medication 20 MILLIEQUIVALENT(S): at 06:18

## 2018-11-16 RX ADMIN — GABAPENTIN 300 MILLIGRAM(S): 400 CAPSULE ORAL at 18:40

## 2018-11-16 RX ADMIN — Medication 325 MILLIGRAM(S): at 11:59

## 2018-11-16 RX ADMIN — MORPHINE SULFATE 1 MILLIGRAM(S): 50 CAPSULE, EXTENDED RELEASE ORAL at 12:47

## 2018-11-16 RX ADMIN — HEPARIN SODIUM 5000 UNIT(S): 5000 INJECTION INTRAVENOUS; SUBCUTANEOUS at 06:19

## 2018-11-16 RX ADMIN — Medication 40 MILLIEQUIVALENT(S): at 10:49

## 2018-11-16 RX ADMIN — Medication 81 MILLIGRAM(S): at 11:59

## 2018-11-16 RX ADMIN — ATORVASTATIN CALCIUM 10 MILLIGRAM(S): 80 TABLET, FILM COATED ORAL at 22:59

## 2018-11-16 RX ADMIN — CEFEPIME 100 MILLIGRAM(S): 1 INJECTION, POWDER, FOR SOLUTION INTRAMUSCULAR; INTRAVENOUS at 18:48

## 2018-11-16 RX ADMIN — MORPHINE SULFATE 4 MILLIGRAM(S): 50 CAPSULE, EXTENDED RELEASE ORAL at 23:41

## 2018-11-16 RX ADMIN — MORPHINE SULFATE 1 MILLIGRAM(S): 50 CAPSULE, EXTENDED RELEASE ORAL at 13:49

## 2018-11-16 RX ADMIN — Medication 650 MILLIGRAM(S): at 13:49

## 2018-11-16 RX ADMIN — Medication 110 MILLIGRAM(S): at 18:39

## 2018-11-16 RX ADMIN — MONTELUKAST 10 MILLIGRAM(S): 4 TABLET, CHEWABLE ORAL at 22:59

## 2018-11-16 RX ADMIN — Medication 650 MILLIGRAM(S): at 12:49

## 2018-11-16 RX ADMIN — Medication 40 MILLIEQUIVALENT(S): at 18:39

## 2018-11-16 RX ADMIN — HEPARIN SODIUM 5000 UNIT(S): 5000 INJECTION INTRAVENOUS; SUBCUTANEOUS at 18:37

## 2018-11-16 RX ADMIN — Medication 110 MILLIGRAM(S): at 08:31

## 2018-11-16 RX ADMIN — Medication 1 APPLICATION(S): at 23:00

## 2018-11-16 RX ADMIN — WARFARIN SODIUM 6 MILLIGRAM(S): 2.5 TABLET ORAL at 22:59

## 2018-11-16 RX ADMIN — Medication 3 MILLILITER(S): at 08:04

## 2018-11-16 RX ADMIN — Medication 80 MILLIGRAM(S): at 06:19

## 2018-11-16 RX ADMIN — CEFEPIME 100 MILLIGRAM(S): 1 INJECTION, POWDER, FOR SOLUTION INTRAMUSCULAR; INTRAVENOUS at 06:19

## 2018-11-16 RX ADMIN — Medication 100 MILLIGRAM(S): at 06:18

## 2018-11-16 RX ADMIN — Medication 1: at 11:59

## 2018-11-16 RX ADMIN — Medication 80 MILLIGRAM(S): at 18:37

## 2018-11-16 RX ADMIN — Medication 100 MILLIGRAM(S): at 12:05

## 2018-11-16 NOTE — PROGRESS NOTE ADULT - ASSESSMENT
68y F PMHx of DM, CHF, Venous Insufficiency, Ulcerative Colitis, HTN, HLD admitted 11/13/18, with fevers starting the night before admission and RLE pain. Patient with chronic ulcer of the posterior calf of the RLE, has been causing recurrent cellulitis with multiple hospital admissions since 8/18, patient admits that in the past couple of days her leg has been having increased swelling, erythema and pain. Pain is non-radiating constant severe burning pain with no alleviation to NSAIDs and Percocet. Patient admits that the pain is associated with chills, nausea with heaving no vomiting, as well as shortness of breath and palpitations, found to be in Afib RVR in the ED with elevated LA/wbc ct 23, was given IV vancomycin/cefepime for cellulitis.     1. sepsis/chronic b/l LE stasis dermatitis/superimposed RLE cellulitis/PVD/obesity  - slowly improving  - on cefepime [id number] for gnr resistant coverage #3  - on doxy 100mg BID for mrsa coverage #3  - continue with abx coverage  - cx no growth  - wound care/vascular eval appreciated  - monitor temps  - tolerating abx well so far; no side effects noted  - reason for abx use and side effects reviewed with patient  - supportive care  - f/u cbc    2. other issues - care per medicine

## 2018-11-16 NOTE — PROGRESS NOTE ADULT - SUBJECTIVE AND OBJECTIVE BOX
Date of service: 11-16-18 @ 11:27    pt seen and examined  no fevers  RLE less swollen/improving    ROS: no fever or chills; denies dizziness, no HA, no SOB or cough, no abdominal pain, no diarrhea or constipation; no dysuria, no urinary frequency, no legs pain, no rashes      MEDICATIONS  (STANDING):  ALBUTerol/ipratropium for Nebulization 3 milliLiter(s) Nebulizer every 6 hours  aspirin  chewable 81 milliGRAM(s) Oral daily  atorvastatin 10 milliGRAM(s) Oral at bedtime  bisacodyl 5 milliGRAM(s) Oral at bedtime  cefepime   IVPB 2000 milliGRAM(s) IV Intermittent every 12 hours  dextrose 5%. 1000 milliLiter(s) (50 mL/Hr) IV Continuous <Continuous>  dextrose 50% Injectable 12.5 Gram(s) IV Push once  dextrose 50% Injectable 25 Gram(s) IV Push once  dextrose 50% Injectable 25 Gram(s) IV Push once  diltiazem    Tablet 120 milliGRAM(s) Oral two times a day  docusate sodium 100 milliGRAM(s) Oral daily  doxycycline IVPB 100 milliGRAM(s) IV Intermittent every 12 hours  doxycycline IVPB      ferrous    sulfate 325 milliGRAM(s) Oral daily  furosemide   Injectable 80 milliGRAM(s) IV Push every 12 hours  gabapentin 300 milliGRAM(s) Oral two times a day  heparin  Injectable 5000 Unit(s) SubCutaneous every 12 hours  insulin lispro (HumaLOG) corrective regimen sliding scale   SubCutaneous three times a day before meals  insulin lispro (HumaLOG) corrective regimen sliding scale   SubCutaneous at bedtime  metoprolol succinate  milliGRAM(s) Oral daily  montelukast 10 milliGRAM(s) Oral at bedtime  morphine  - Injectable 4 milliGRAM(s) IV Push once  potassium chloride    Tablet ER 40 milliEquivalent(s) Oral two times a day  potassium chloride    Tablet ER 40 milliEquivalent(s) Oral once  Uceris (Budesonide EC) 9 milliGRAM(s) 9 milliGRAM(s) Oral daily        Vital Signs Last 24 Hrs  T(C): 36.8 (16 Nov 2018 05:10), Max: 36.9 (15 Nov 2018 20:31)  T(F): 98.3 (16 Nov 2018 05:10), Max: 98.5 (15 Nov 2018 20:31)  HR: 74 (16 Nov 2018 08:05) (50 - 96)  BP: 132/54 (16 Nov 2018 05:10) (132/54 - 135/80)  BP(mean): --  RR: 19 (16 Nov 2018 05:10) (18 - 19)  SpO2: 98% (16 Nov 2018 05:10) (98% - 98%)        PE:  Constitutional: frail looking  HEENT: NC/AT, EOMI, PERRLA, conjunctivae clear; ears and nose atraumatic; pharynx benign  Neck: supple; thyroid not palpable  Back: no tenderness  Respiratory: respiratory effort normal; clear to auscultation  Cardiovascular: S1S2 regular, no murmurs  Abdomen: soft, not tender, not distended, positive BS; liver and spleen WNL  Genitourinary: no suprapubic tenderness  Lymphatic: no LN palpable  Musculoskeletal: no muscle tenderness, no joint swelling or tenderness  Extremities:  pedal/b/l 2-3 + edema  Neurological/ Psychiatric: Judgement and insight normal;  moving all extremities  Skin: LLE stasis dermatitis changes, no erythema Right calf ulcer w/ discharge surrounding erythema/edema/warmth no palpable lesions    Labs: all available labs reviewed                                   11.3   8.39  )-----------( 313      ( 16 Nov 2018 05:31 )             35.0     11-16    142  |  107  |  11  ----------------------------<  135<H>  3.2<L>   |  28  |  0.39<L>    Ca    8.2<L>      16 Nov 2018 05:31  Phos  3.3     11-16  Mg     1.8     11-16             Culture - Urine (11.13.18 @ 19:35)    Specimen Source: .Urine Clean Catch (Midstream)    Culture Results:   No growth    Culture - Blood (11.13.18 @ 16:00)    Specimen Source: .Blood None    Culture Results:   No growth to date.          Radiology: all available radiological tests reviewed  < from: CT Lower Extremity w/ IV Cont, Right (11.13.18 @ 18:20) >    EXAM:  CT LWR EXT IC RT                            PROCEDURE DATE:  11/13/2018          INTERPRETATION:  [facility] RADIOLOGIST PRELIMINARY REPORT    EXAM:    CT Right Lower Extremity With IV Contrast, Tibia and Fibula     EXAM DATE/TIME:    11/13/2018 6:31 PM     CLINICAL HISTORY:    68 years old, female; Pain; Lower leg; Right     TECHNIQUE:    CT of the Right lower extremity with intravenous contrast was performed.   Exam   focused on the tibia and fibula.    Coronal and sagittal reformatted images were created and reviewed.     COMPARISON:    CR XR LOWER EXTREMITY RIGHT 11/13/2018 5:45 PM     FINDINGS:    Bones/joints:  Severe tricompartment degenerative changes of the knee.   Small   partially visualized the patella joint effusion with calcifications.   Degenerative changes in the foot. Old nonunited fractures in the medial   malleolus. No acute bony abnormality identified. No obvious bony   destructive   changes. Calcaneal spurs.    Soft tissues:  Extensive diffuse soft tissue edema andskin thickening.   No   loculated fluid collection. Possible skin ulceration in the posterior   calf.    Vasculature:  Arteriosclerosis.     IMPRESSION:   1. Extensive diffuse soft tissue edema and skin thickening. No loculated   fluid   collection. Possible skin ulceration in the posterior calf.   2. No acute bony abnormality or obvious bony destructive changes.   3. Degenerative changes of the bones.   4. Arteriosclerosis.   5. Other findings as described above.    CT LWR EXT IC RT dated 11/13/2018 6:20 PM     INDICATION: Right lower leg pain and swelling with wound.    COMPARISON: Ankle radiographs dated 11/13/2018    TECHNIQUE: CT imaging of the right lower extremity was performed with   contrast. The data was reformatted in the axial, coronal, and sagittal   planes.   Contrast: Omnipaque 350. Administered: 150 cc per discarded: 50 cc.    FINDINGS:    OSSEOUS STRUCTURES: There is no cortical erosion or destruction to   suggest osteomyelitis. No periosteal reaction is seen. There is severe   osteoarthritis of the knee with marked joint space narrowing the   patellofemoral compartment. Severe narrowing of the medial knee joint   space. Subchondral sclerosis and cystic changes are noted. There are   degenerative changes at the midfoot joint with moderate to severe   narrowing at the second through fourth TMT's. There are subchondral   cystic changes at the second TMT.  SYNOVIUM/ JOINT FLUID: There is partially imaged joint effusion at the   knee.  TENDONS: The tendons are intact.No full-thickness tendon tear or   retraction is noted.  MUSCLES: There is no intramuscular hematoma. There is a small   intramuscular lipoma involving the medial gastrocnemius muscle inferiorly   measuring up to 1.6 cm.  NEUROVASCULAR STRUCTURES: There is moderate to severe vascular   calcification present.  SUBCUTANEOUS SOFT TISSUES: There is circumferential soft tissue swelling   about the calf. There is mild associated skin thickening. There is a   small suspected skin defect at the posterioraspect of the lower calf   measuring up to 1.4 cm. Skin irregularity is noted along the dorsum of   the midfoot and forefoot with overlying bandage. A focal confluent area   of hypodensity is appreciated overlying the base of the first metatarsal   measuring approximately 1.0 x 1.0 x 3.1 cm (series 2, image 214).      IMPRESSION:    1.  Circumferential soft tissue swelling about the calf with extension   into the foot. Nonspecific but can be seen in the setting of cellulitis.  2.  Focal area of hypodensity in the soft tissues over the base of the   first metatarsal concerning for phlegmonous change versus early abscess   formation.  3.  No CT evidence for osteomyelitis. If concern persists, MRI or bone   scan can be performed.    < end of copied text >    Advanced directives addressed: full resuscitation

## 2018-11-16 NOTE — PROGRESS NOTE ADULT - SUBJECTIVE AND OBJECTIVE BOX
CHIEF COMPLAINT:    SUBJECTIVE:   HPI  11/13/18  Patient is 68y F PMHx of DM, CHF, Venous Insufficiency, Ulcerative Colitis, HTN, HLD, presents to [facility] on 11/13/18, with fevers starting the night before admission and RLE pain. Patient with chronic ulcer of the posterior calf of the RLE, has been causing recurrent cellulitis with multiple hospital admissions since 8/18, patient admits that in the past couple of days her leg has been having increased swelling, erythema and pain. Pain is non-radiating constant severe burning pain with no alleviation to NSAIDs and Percocet. Patient admits that the pain is associated with chills, nausea with heaving no vomiting, as well as shortness of breath and palpitations, found to be in Afib RVR in the ED. Patient also admits some diarrhea but is associated with her UC.      ---  SUBJECTIVE   11/14/18  Patient was seen and evaluated at bedside. States RLE pain has been 10/10 with burning sensation and chills also present. No other complaints at this time. Potassium was repleted this AM.     -----  11/15/18  Overnight, patient desaturated to low 90s and was wheezing. Patient was given Lasix STAT and duoneb treatment. Lasix was subsequently ordered for 80 mg IV BID.  Patient seen today, breathing and wheezing improved and still complaining of RLE pain , with burning sensation.   K repleted in the AM.    -----  11/16/18    Overnight, patient on tele in afib with HR range 70-130s and some PVCs. Vitals otherwise stable. No wheezing or SOB. Patient reports leg pain/ burning is still severe. Discussed increasing gabapentin to 300 mg TID. She also c/o tremors that have recently started. Told patient that can be side effect of duoneb and changed order to PRN duoneb. INR today <2 and warfarin ordered for 6 mg.  Patient states she takes potassium supplements at home, and ordered was placed today.        REVIEW OF SYSTEMS:    CONSTITUTIONAL: No weakness, no fevers , no chills  EYES/ENT: No visual changes;  No vertigo or throat pain   NECK: No pain or stiffness  RESPIRATORY: No cough, wheezing, hemoptysis; No shortness of breath  CARDIOVASCULAR: No chest pain or palpitations  GASTROINTESTINAL: No abdominal or epigastric pain. No nausea, vomiting, or hematemesis; No diarrhea or constipation. No melena or hematochezia.  GENITOURINARY: No dysuria, frequency or hematuria  NEUROLOGICAL: No numbness or weakness  SKIN: No itching, burning, rashes, or lesions   EXTREMITIES: RLE pain and burning sensation   All other review of systems is negative unless indicated above    Vital Signs Last 24 Hrs  T(C): 37.9 (16 Nov 2018 11:57), Max: 37.9 (16 Nov 2018 11:57)  T(F): 100.2 (16 Nov 2018 11:57), Max: 100.2 (16 Nov 2018 11:57)  HR: 90 (16 Nov 2018 11:57) (50 - 96)  BP: 119/60 (16 Nov 2018 11:57) (119/60 - 135/80)  BP(mean): --  RR: 18 (16 Nov 2018 11:57) (18 - 19)  SpO2: 98% (16 Nov 2018 11:57) (98% - 98%)    I&O's Summary    CAPILLARY BLOOD GLUCOSE      POCT Blood Glucose.: 197 mg/dL (16 Nov 2018 11:55)  POCT Blood Glucose.: 157 mg/dL (16 Nov 2018 08:26)  POCT Blood Glucose.: 193 mg/dL (15 Nov 2018 21:02)  POCT Blood Glucose.: 155 mg/dL (15 Nov 2018 17:09)    PHYSICAL EXAM:    Constitutional: NAD, awake and alert, obese  Respiratory: Breath sounds are clear bilaterally, No wheezing, rales or rhonchi  Cardiovascular: S1 and S2, regular rate and rhythm, no Murmurs, gallops or rubs  Gastrointestinal: Bowel Sounds present, soft, nontender, nondistended, no guarding, no rebound  Extremities: LLE peripheral edema noted, with erythematous changes consistent with venous insufficiency. RLE tender to palpation and dressing intact   Vascular: 1+ peripheral pulses      MEDICATIONS  (STANDING):  aspirin  chewable 81 milliGRAM(s) Oral daily  atorvastatin 10 milliGRAM(s) Oral at bedtime  bisacodyl 5 milliGRAM(s) Oral at bedtime  cefepime   IVPB 2000 milliGRAM(s) IV Intermittent every 12 hours  dextrose 5%. 1000 milliLiter(s) (50 mL/Hr) IV Continuous <Continuous>  dextrose 50% Injectable 12.5 Gram(s) IV Push once  dextrose 50% Injectable 25 Gram(s) IV Push once  dextrose 50% Injectable 25 Gram(s) IV Push once  diltiazem    Tablet 120 milliGRAM(s) Oral two times a day  docusate sodium 100 milliGRAM(s) Oral daily  doxycycline IVPB 100 milliGRAM(s) IV Intermittent every 12 hours  doxycycline IVPB      ferrous    sulfate 325 milliGRAM(s) Oral daily  furosemide   Injectable 80 milliGRAM(s) IV Push every 12 hours  gabapentin 300 milliGRAM(s) Oral three times a day  heparin  Injectable 5000 Unit(s) SubCutaneous every 12 hours  insulin lispro (HumaLOG) corrective regimen sliding scale   SubCutaneous three times a day before meals  insulin lispro (HumaLOG) corrective regimen sliding scale   SubCutaneous at bedtime  metoprolol succinate  milliGRAM(s) Oral daily  montelukast 10 milliGRAM(s) Oral at bedtime  morphine  - Injectable 4 milliGRAM(s) IV Push once  potassium chloride    Tablet ER 40 milliEquivalent(s) Oral two times a day  Uceris (Budesonide EC) 9 milliGRAM(s) 9 milliGRAM(s) Oral daily  warfarin 6 milliGRAM(s) Oral daily    MEDICATIONS  (PRN):  acetaminophen   Tablet .. 650 milliGRAM(s) Oral every 6 hours PRN Temp greater or equal to 38C (100.4F)  ALBUTerol/ipratropium for Nebulization 3 milliLiter(s) Nebulizer every 6 hours PRN Shortness of Breath and/or Wheezing  dextrose 40% Gel 15 Gram(s) Oral once PRN Blood Glucose LESS THAN 70 milliGRAM(s)/deciliter  dicyclomine 20 milliGRAM(s) Oral daily PRN abdominal pain  glucagon  Injectable 1 milliGRAM(s) IntraMuscular once PRN Glucose LESS THAN 70 milligrams/deciliter  morphine  - Injectable 1 milliGRAM(s) IV Push every 4 hours PRN Moderate Pain (4 - 6)  zolpidem 5 milliGRAM(s) Oral at bedtime PRN Insomnia          LABS: All Labs Reviewed:                                              11.3   8.39  )-----------( 313      ( 16 Nov 2018 05:31 )             35.0     11-16    142  |  107  |  11  ----------------------------<  135<H>  3.2<L>   |  28  |  0.39<L>    Ca    8.2<L>      16 Nov 2018 05:31  Phos  3.3     11-16  Mg     1.8     11-16        PT/INR - ( 16 Nov 2018 08:30 )   PT: 16.3 sec;   INR: 1.45 ratio         PTT - ( 16 Nov 2018 08:30 )  PTT:33.5 sec         PTT - ( 13 Nov 2018 16:00 )  PTT:39.1 sec  CARDIAC MARKERS ( 14 Nov 2018 05:25 )  x     / x     / 41 U/L / x     / x      CARDIAC MARKERS ( 13 Nov 2018 20:23 )  <0.015 ng/mL / x     / x     / x     / x      CARDIAC MARKERS ( 13 Nov 2018 16:33 )  <0.015 ng/mL / x     / 44 U/L / x     / x      CARDIAC MARKERS ( 13 Nov 2018 16:00 )  <0.015 ng/mL / x     / x     / x     / x          Blood Culture:     RADIOLOGY/EKG:    CT LOWER EXTREMITY W/ IV CONT        IMPRESSION:    1.  Circumferential soft tissue swelling about the calf with extension   into the foot. Nonspecific but can be seen in the setting of cellulitis.  2.  Focal area of hypodensity in the soft tissues over the base of the   first metatarsal concerning for phlegmonous change versus early abscess   formation.  3.  No CT evidence for osteomyelitis. If concern persists, MRI or bone   scan can be performed.    ---  TTE 11/15/18   Impression     Summary     Mild to moderate mitral regurgitation is present.   Significant fibrocalcific changes noted to the aortic valve leaflets with   restriction in leaflet excursion. Peak transaortic gradient is 27 mmHg;   this finding is consistent with mild aortic stenosis.   Trace aortic regurgitation is present.   Moderate (2+) tricuspid valve regurgitation is present.   Normal appearing pulmonic valve structure and function.   The left atrium is moderately dilated.   The left ventricle is normal in size, wall motion and contractility.   Mild concentric left ventricular hypertrophy is present.   Estimated left ventricular ejection fraction is 55-60 %.   The right atrium appears moderately dilated.   Normal appearing right ventricle structure and function.   All visualized extra cardiac structures appears to be normal.   No evidence of pericardial effusion.    DVT PPX: Hep SQ BID    ADVANCED DIRECTIVE:    DISPOSITION: home w/ home PT- face to face to be done

## 2018-11-16 NOTE — PROGRESS NOTE ADULT - SUBJECTIVE AND OBJECTIVE BOX
Patient is a 68y old  Female who presents with a chief complaint of Fevers (15 Nov 2018 15:01), feels better today. SOB improved.    cefepime   IVPB 2000  doxycycline IVPB 100  doxycycline IVPB   aspirin  chewable 81  cefepime   IVPB 2000  diltiazem    Tablet 120  doxycycline IVPB 100  doxycycline IVPB   furosemide   Injectable 80  heparin  Injectable 5000  metoprolol succinate     Allergies    penicillin (Hives)    Intolerances        Vital Signs Last 24 Hrs  T(C): 36.8 (16 Nov 2018 05:10), Max: 37.4 (15 Nov 2018 10:45)  T(F): 98.3 (16 Nov 2018 05:10), Max: 99.4 (15 Nov 2018 10:45)  HR: 50 (16 Nov 2018 05:10) (50 - 96)  BP: 132/54 (16 Nov 2018 05:10) (113/52 - 135/80)  BP(mean): --  RR: 19 (16 Nov 2018 05:10) (18 - 19)  SpO2: 98% (16 Nov 2018 05:10) (96% - 98%)  I&O's Detail    15 Nov 2018 07:01  -  16 Nov 2018 07:00  --------------------------------------------------------  IN:  Total IN: 0 mL    OUT:    Voided: 400 mL  Total OUT: 400 mL    Total NET: -400 mL          Physical Exam:  General: NAD, resting comfortably in bed  Pulmonary: Nonlabored breathing, no respiratory distress  Cardiovascular: NSR  Abdominal: soft, NT/ND  Extremities: WWP  Pulses:   Right:                                                                          Left:  FEM [ ]2+ [ ]1+ [ ]doppler                                             FEM [ ]2+ [ ]1+ [ ]doppler    POP [ ]2+ [ ]1+ [ ]doppler                                             POP [ ]2+ [ ]1+ [ ]doppler    DP [ ]2+ [ ]1+ [ x]doppler                                                DP [ ]2+ [ ]1+ [x ]doppler  PT[ ]2+ [ ]1+ [ x]doppler                                                  PT [ ]2+ [ ]1+ [x ]doppler      LABS:                        11.3   8.39  )-----------( 313      ( 16 Nov 2018 05:31 )             35.0     11-16    142  |  107  |  11  ----------------------------<  135<H>  3.2<L>   |  28  |  0.39<L>    Ca    8.2<L>      16 Nov 2018 05:31  Phos  3.3     11-16  Mg     1.8     11-16      PT/INR - ( 15 Nov 2018 09:09 )   PT: 17.3 sec;   INR: 1.54 ratio           CAPILLARY BLOOD GLUCOSE      POCT Blood Glucose.: 157 mg/dL (16 Nov 2018 08:26)  POCT Blood Glucose.: 193 mg/dL (15 Nov 2018 21:02)  POCT Blood Glucose.: 155 mg/dL (15 Nov 2018 17:09)  POCT Blood Glucose.: 171 mg/dL (15 Nov 2018 11:10)    Radiology and Additional Studies:    Assessment and Plan: 68yFemaleSEPSIS, CELLULITIS, also with SOB. WBC normal, leg cellulitis improved. SOB from fluid overload, also improving. Arterial duplex without evidence of stenosis.    -Improved today  -Pain control  -Leg elevation  -Care per cards and ID  -Will follow leg wounds

## 2018-11-16 NOTE — PROGRESS NOTE ADULT - ASSESSMENT
68y F PMHx of DM, CHF, Venous Insufficiency, Ulcerative Colitis, HTN, HLD, recurrent cellulitis presents to [facility] on 11/13/18, with fevers starting the night before admission and RLE pain 2/2 cellulitis.    #RLE Cellulitis   ~in setting of infected R foot hematoma  -monitor temp and WBC  -Continue cefepime 2 g BID and doxy 100mg BID for mrsa coverage  -Blood cx- NGTD, Ucx- NGTD  -ID recs appreciated  -Vascular recs appreciated: -Bilateral LE compression dressing with collagenase and Ace bandage, Pain control, Leg elevation  -Pain meds- morphine 1 mg IV q4h PRN moderate pain, tylenol q6h (Continue Dulcolax and colace)  -F/u Arterial duplex  -Wound care consulted     #CHF ~ pEF   -11/15 - SOB and wheezing overnight -Lasix and duoneb given  -Continue Lasix 80 mg IV BID   -duoneb q6h PRN (tremors reported)  - echo- EF 55-60% w/ mild aortic stenosis, mild concentric LV hypertrophy  -continue metoprolol- will increased to BID if tachycardiac occurs      #Afib  -CHADSVASc= 5   -continue diltiazem 120 mg BID and coumadin and ASA  -INR = 1.54 on 11/15  -Warfarin 6 mg ordered    #DM2  -ISS  -Gabapentin 300 mg TID       #HTN  -Metoprolol 100 mg QD   -Cardio recs appreciated- can increase metoprolol to Q12, if HR increases  -Discussed monitoring HR today and will increase metoprolol to BID if tachycardiac occurs again     #HLD  -Continue atorvastatin    #Anemia  -resolved  -continue ferrous sulfate       #UC  -Continue Uceris   -Continue bentyl   -Continue Colace and dulcolax     #DISPO  - home w/ home PT- face to face to be done

## 2018-11-17 DIAGNOSIS — K51.919 ULCERATIVE COLITIS, UNSPECIFIED WITH UNSPECIFIED COMPLICATIONS: ICD-10-CM

## 2018-11-17 DIAGNOSIS — E11.9 TYPE 2 DIABETES MELLITUS WITHOUT COMPLICATIONS: ICD-10-CM

## 2018-11-17 DIAGNOSIS — I48.2 CHRONIC ATRIAL FIBRILLATION: ICD-10-CM

## 2018-11-17 LAB
ANION GAP SERPL CALC-SCNC: 6 MMOL/L — SIGNIFICANT CHANGE UP (ref 5–17)
APTT BLD: 30.8 SEC — SIGNIFICANT CHANGE UP (ref 27.5–36.3)
BUN SERPL-MCNC: 18 MG/DL — SIGNIFICANT CHANGE UP (ref 7–23)
CALCIUM SERPL-MCNC: 8.1 MG/DL — LOW (ref 8.5–10.1)
CHLORIDE SERPL-SCNC: 107 MMOL/L — SIGNIFICANT CHANGE UP (ref 96–108)
CO2 SERPL-SCNC: 30 MMOL/L — SIGNIFICANT CHANGE UP (ref 22–31)
CREAT SERPL-MCNC: 0.5 MG/DL — SIGNIFICANT CHANGE UP (ref 0.5–1.3)
GLUCOSE BLDC GLUCOMTR-MCNC: 119 MG/DL — HIGH (ref 70–99)
GLUCOSE BLDC GLUCOMTR-MCNC: 142 MG/DL — HIGH (ref 70–99)
GLUCOSE BLDC GLUCOMTR-MCNC: 168 MG/DL — HIGH (ref 70–99)
GLUCOSE BLDC GLUCOMTR-MCNC: 266 MG/DL — HIGH (ref 70–99)
GLUCOSE SERPL-MCNC: 124 MG/DL — HIGH (ref 70–99)
HCT VFR BLD CALC: 35.3 % — SIGNIFICANT CHANGE UP (ref 34.5–45)
HGB BLD-MCNC: 11.4 G/DL — LOW (ref 11.5–15.5)
INR BLD: 1.56 RATIO — HIGH (ref 0.88–1.16)
MAGNESIUM SERPL-MCNC: 1.6 MG/DL — SIGNIFICANT CHANGE UP (ref 1.6–2.6)
MCHC RBC-ENTMCNC: 30.5 PG — SIGNIFICANT CHANGE UP (ref 27–34)
MCHC RBC-ENTMCNC: 32.3 GM/DL — SIGNIFICANT CHANGE UP (ref 32–36)
MCV RBC AUTO: 94.4 FL — SIGNIFICANT CHANGE UP (ref 80–100)
NRBC # BLD: 0 /100 WBCS — SIGNIFICANT CHANGE UP (ref 0–0)
PHOSPHATE SERPL-MCNC: 3.2 MG/DL — SIGNIFICANT CHANGE UP (ref 2.5–4.5)
PLATELET # BLD AUTO: 366 K/UL — SIGNIFICANT CHANGE UP (ref 150–400)
POTASSIUM SERPL-MCNC: 3.5 MMOL/L — SIGNIFICANT CHANGE UP (ref 3.5–5.3)
POTASSIUM SERPL-SCNC: 3.5 MMOL/L — SIGNIFICANT CHANGE UP (ref 3.5–5.3)
PROTHROM AB SERPL-ACNC: 17.6 SEC — HIGH (ref 10–12.9)
RBC # BLD: 3.74 M/UL — LOW (ref 3.8–5.2)
RBC # FLD: 13.8 % — SIGNIFICANT CHANGE UP (ref 10.3–14.5)
SODIUM SERPL-SCNC: 143 MMOL/L — SIGNIFICANT CHANGE UP (ref 135–145)
WBC # BLD: 7.86 K/UL — SIGNIFICANT CHANGE UP (ref 3.8–10.5)
WBC # FLD AUTO: 7.86 K/UL — SIGNIFICANT CHANGE UP (ref 3.8–10.5)

## 2018-11-17 RX ORDER — ASCORBIC ACID 60 MG
500 TABLET,CHEWABLE ORAL DAILY
Qty: 0 | Refills: 0 | Status: DISCONTINUED | OUTPATIENT
Start: 2018-11-17 | End: 2018-11-20

## 2018-11-17 RX ADMIN — Medication 100 MILLIGRAM(S): at 12:34

## 2018-11-17 RX ADMIN — ZOLPIDEM TARTRATE 5 MILLIGRAM(S): 10 TABLET ORAL at 23:05

## 2018-11-17 RX ADMIN — MORPHINE SULFATE 1 MILLIGRAM(S): 50 CAPSULE, EXTENDED RELEASE ORAL at 18:49

## 2018-11-17 RX ADMIN — Medication 81 MILLIGRAM(S): at 12:34

## 2018-11-17 RX ADMIN — HEPARIN SODIUM 5000 UNIT(S): 5000 INJECTION INTRAVENOUS; SUBCUTANEOUS at 18:07

## 2018-11-17 RX ADMIN — MONTELUKAST 10 MILLIGRAM(S): 4 TABLET, CHEWABLE ORAL at 21:33

## 2018-11-17 RX ADMIN — Medication 3: at 18:08

## 2018-11-17 RX ADMIN — ATORVASTATIN CALCIUM 10 MILLIGRAM(S): 80 TABLET, FILM COATED ORAL at 21:33

## 2018-11-17 RX ADMIN — MORPHINE SULFATE 1 MILLIGRAM(S): 50 CAPSULE, EXTENDED RELEASE ORAL at 18:07

## 2018-11-17 RX ADMIN — Medication 325 MILLIGRAM(S): at 12:34

## 2018-11-17 RX ADMIN — Medication 40 MILLIEQUIVALENT(S): at 06:25

## 2018-11-17 RX ADMIN — GABAPENTIN 300 MILLIGRAM(S): 400 CAPSULE ORAL at 14:35

## 2018-11-17 RX ADMIN — GABAPENTIN 300 MILLIGRAM(S): 400 CAPSULE ORAL at 21:33

## 2018-11-17 RX ADMIN — Medication 5 MILLIGRAM(S): at 21:33

## 2018-11-17 RX ADMIN — WARFARIN SODIUM 6 MILLIGRAM(S): 2.5 TABLET ORAL at 21:33

## 2018-11-17 RX ADMIN — Medication 80 MILLIGRAM(S): at 06:25

## 2018-11-17 RX ADMIN — CEFEPIME 100 MILLIGRAM(S): 1 INJECTION, POWDER, FOR SOLUTION INTRAMUSCULAR; INTRAVENOUS at 18:08

## 2018-11-17 RX ADMIN — Medication 1 APPLICATION(S): at 12:34

## 2018-11-17 RX ADMIN — Medication 80 MILLIGRAM(S): at 18:06

## 2018-11-17 RX ADMIN — CEFEPIME 100 MILLIGRAM(S): 1 INJECTION, POWDER, FOR SOLUTION INTRAMUSCULAR; INTRAVENOUS at 06:43

## 2018-11-17 RX ADMIN — Medication 500 MILLIGRAM(S): at 21:32

## 2018-11-17 RX ADMIN — HEPARIN SODIUM 5000 UNIT(S): 5000 INJECTION INTRAVENOUS; SUBCUTANEOUS at 06:25

## 2018-11-17 RX ADMIN — Medication 110 MILLIGRAM(S): at 07:49

## 2018-11-17 RX ADMIN — Medication 100 MILLIGRAM(S): at 06:25

## 2018-11-17 RX ADMIN — Medication 110 MILLIGRAM(S): at 18:09

## 2018-11-17 RX ADMIN — GABAPENTIN 300 MILLIGRAM(S): 400 CAPSULE ORAL at 06:25

## 2018-11-17 RX ADMIN — Medication 40 MILLIEQUIVALENT(S): at 18:09

## 2018-11-17 RX ADMIN — Medication 1: at 12:35

## 2018-11-17 NOTE — PROGRESS NOTE ADULT - SUBJECTIVE AND OBJECTIVE BOX
CHIEF COMPLAINT: Patient is a 68y old  Female who presents with a chief complaint of Fevers (13 Nov 2018 23:01)      HPI: HPI:  Patient is 68y F PMHx of DM, CHF, Afib, HFpEF, Venous Insufficiency, Ulcerative Colitis, HTN, HLD, presents to [facility] on 11/13/18, with fevers starting the night before admission and RLE pain. Patient with chronic ulcer of the posterior calf of the RLE, has been causing recurrent cellulitis with multiple hospital admissions since 8/18, patient admits that in the past couple of days her leg has been having increased swelling, erythema and pain. Pain is non-radiating constant severe burning pain with no alleviation to NSAIDs and Percocet. Patient admits that the pain is associated with chills, nausea with heaving no vomiting, as well as shortness of breath and palpitations, found to be in Afib RVR in the ED. Patient also admits some diarrhea but is associated with her UC. (13 Nov 2018 23:01)    11/15/18:  Patient required IV furosemide early this am    11/17/18:  Back on Lasix and diuresing and feeling better.  No increased SOB.  No anginal chest pains.  AF rate controlled on the monitor on her current meds.  No new cardiac issues so far.        PMHx:  PAST MEDICAL & SURGICAL HISTORY:  HTN (hypertension)  Thyroid nodule  Peripheral venous insufficiency  Neuropathy  Morbid obesity with BMI of 40.0-44.9, adult  Dyspnea  Congestive heart failure  Atrial fibrillation  Diabetes mellitus type II, controlled  Status post medial meniscus repair  S/P left knee arthroscopy  Other complications of gastric band procedure  H/O colonoscopy  History of esophagogastroduodenoscopy (EGD)      FAMILY HISTORY:   FAMILY HISTORY:  Family history of diabetes mellitus in mother (Mother)  Family history of breast cancer in mother (Mother)      ALLERGIES:  Allergies  penicillin (Hives)        REVIEW OF SYSTEMS:    CONSTITUTIONAL: No fevers or chills  EYES/ENT: No visual changes;  No vertigo or throat pain   NECK: No pain or stiffness  RESPIRATORY: No cough, wheezing, hemoptysis;   CARDIOVASCULAR: No chest pain or palpitations  GASTROINTESTINAL: No abdominal or epigastric pain. No nausea, vomiting, or hematemesis; No diarrhea or constipation. No melena or hematochezia.  GENITOURINARY: No dysuria, frequency or hematuria  NEUROLOGICAL: No numbness  All other review of systems is negative unless indicated above    Vital Signs Last 24 Hrs  T(C): 36.9 (17 Nov 2018 04:23), Max: 37.9 (16 Nov 2018 11:57)  T(F): 98.4 (17 Nov 2018 04:23), Max: 100.2 (16 Nov 2018 11:57)  HR: 73 (17 Nov 2018 04:23) (73 - 110)  BP: 132/70 (17 Nov 2018 04:23) (119/60 - 132/76)  BP(mean): --  RR: 18 (16 Nov 2018 20:49) (18 - 20)  SpO2: 97% (17 Nov 2018 04:23) (96% - 98%)      CAPILLARY BLOOD GLUCOSE  POCT Blood Glucose.: 194 mg/dL (16 Nov 2018 22:35)  POCT Blood Glucose.: 132 mg/dL (16 Nov 2018 17:06)  POCT Blood Glucose.: 197 mg/dL (16 Nov 2018 11:55)  POCT Blood Glucose.: 157 mg/dL (16 Nov 2018 08:26)      PHYSICAL EXAM:   Constitutional: NAD, awake and alert, well-developed  HEENT: PERR, EOMI, Normal Hearing, MMM  Neck: Soft and supple, No LAD, No JVD  Respiratory: Breath sounds are clear bilaterally, No wheezing, rales or rhonchi  Cardiovascular: S1 and S2, regular rate and rhythm, soft SHAHAB at LLSB and base as before, no gallops or rubs  Gastrointestinal: Bowel Sounds present, soft, nontender, nondistended, no guarding, no rebound  Extremities: No peripheral edema  Vascular: 2+ peripheral pulses  Neurological: A/O x 3, no focal deficits  Musculoskeletal: 5/5 strength b/l upper and lower extremities  Skin: No rashes      MEDICATIONS  (STANDING):  aspirin  chewable 81 milliGRAM(s) Oral daily  atorvastatin 10 milliGRAM(s) Oral at bedtime  bisacodyl 5 milliGRAM(s) Oral at bedtime  cefepime   IVPB 2000 milliGRAM(s) IV Intermittent every 12 hours  collagenase Ointment 1 Application(s) Topical daily  dextrose 5%. 1000 milliLiter(s) (50 mL/Hr) IV Continuous <Continuous>  dextrose 50% Injectable 12.5 Gram(s) IV Push once  dextrose 50% Injectable 25 Gram(s) IV Push once  dextrose 50% Injectable 25 Gram(s) IV Push once  diltiazem    Tablet 120 milliGRAM(s) Oral two times a day  docusate sodium 100 milliGRAM(s) Oral daily  doxycycline IVPB 100 milliGRAM(s) IV Intermittent every 12 hours  doxycycline IVPB      ferrous    sulfate 325 milliGRAM(s) Oral daily  furosemide   Injectable 80 milliGRAM(s) IV Push every 12 hours  gabapentin 300 milliGRAM(s) Oral three times a day  heparin  Injectable 5000 Unit(s) SubCutaneous every 12 hours  insulin lispro (HumaLOG) corrective regimen sliding scale   SubCutaneous three times a day before meals  insulin lispro (HumaLOG) corrective regimen sliding scale   SubCutaneous at bedtime  metoprolol succinate  milliGRAM(s) Oral daily  montelukast 10 milliGRAM(s) Oral at bedtime  potassium chloride    Tablet ER 40 milliEquivalent(s) Oral two times a day  Uceris (Budesonide EC) 9 milliGRAM(s) 9 milliGRAM(s) Oral daily  warfarin 6 milliGRAM(s) Oral daily      LABS: All Labs Reviewed:                        11.4   7.86  )-----------( 366      ( 17 Nov 2018 04:13 )             35.3     11-17    143  |  107  |  18  ----------------------------<  124<H>  3.5   |  30  |  0.50    Ca    8.1<L>      17 Nov 2018 04:13  Phos  3.2     11-17  Mg     1.6     11-17      PT/INR - ( 17 Nov 2018 04:13 )   PT: 17.6 sec;   INR: 1.56 ratio         PTT - ( 17 Nov 2018 04:13 )  PTT:30.8 sec      Serum Pro-Brain Natriuretic Peptide: 1965 pg/mL (11-13 @ 16:33)  Serum Pro-Brain Natriuretic Peptide: 2002 pg/mL (11-13 @ 16:00)    BLOOD CULTURES: Organism --  Gram Stain Blood -- Gram Stain --  Specimen Source .Urine Clean Catch (Midstream)  Culture-Blood --    Organism --  Gram Stain Blood -- Gram Stain --  Specimen Source .Blood None  Culture-Blood --      LIPID PROFILE     RADIOLOGY:      EKG:  Atrial fibrillation with a rapid VR; PRWP    Telemetry:  Atrial fibrillation with a controlled VR    ECHO:  11/15/18:   Mitral Valve   Mild to moderate mitral regurgitation is present.     Aortic Valve   Significant fibrocalcific changes noted to the aortic valve leaflets with restriction in leaflet excursion. Peak transaortic gradient is 27 mmHg; this finding is consistent with mild aortic stenosis.   Trace aortic regurgitation is present.     Tricuspid Valve   Moderate (2+) tricuspid valve regurgitation is present.     Pulmonic Valve   Normal appearing pulmonic valve structure and function.     Left Atrium   The left atrium is moderately dilated.     Left Ventricle   The left ventricle is normal in size, wall motion and contractility.   Mild concentric left ventricular hypertrophy is present.   Estimated left ventricular ejection fraction is 55-60 %.     Right Atrium   The right atrium appears moderately dilated.     Right Ventricle   Normal appearing right ventricle structure and function.     Pericardial Effusion   No evidence of pericardial effusion.     Pleural Effusion   No evidence of pleural effusion.     Miscellaneous   All visualized extra cardiac structures appears to be normal.     Summary   Mild to moderate mitral regurgitation is present.   Significant fibrocalcific changes noted to the aortic valve leaflets with restriction in leaflet excursion. Peak transaortic gradient is 27 mmHg; this finding is consistent with mild aortic stenosis.   Trace aortic regurgitation is present.   Moderate (2+) tricuspid valve regurgitation is present.   Normal appearing pulmonic valve structure and function.   The left atrium is moderately dilated.   The left ventricle is normal in size, wall motion and contractility.   Mild concentric left ventricular hypertrophy is present.   Estimated left ventricular ejection fraction is 55-60 %.   The right atrium appears moderately dilated.   Normal appearing right ventricle structure and function.   All visualized extra cardiac structures appears to be normal.   No evidence of pericardial effusion.

## 2018-11-17 NOTE — PROGRESS NOTE ADULT - ASSESSMENT
68y F PMHx of DM, CHF, Venous Insufficiency, Ulcerative Colitis, HTN, HLD, recurrent cellulitis presents to [facility] on 11/13/18, with fevers starting the night before admission and RLE pain 2/2 cellulitis.    #RLE Cellulitis in setting of infected R foot hematoma  - trend temp and WBC  -Continue cefepime 2 g BID and doxy 100mg BID for mrsa coverage  -Blood cx- NGTD, Ucx- NGTD  -ID recs appreciated  -Vascular recs appreciated: -Bilateral LE compression dressing with collagenase and Ace bandage, Pain control, Leg elevation  -Pain meds- morphine 1 mg IV q4h PRN moderate pain (Continue Dulcolax and colace PRN)  -F/u Arterial duplex  -Wound care consulted     #CHF ~ pEF   -Continue Lasix 80 mg IV BID   -duoneb q6h PRN (tremors reported)  - echo- EF 55-60% w/ mild aortic stenosis, mild concentric LV hypertrophy  -continue metoprolol- will increased to BID if tachycardiac occurs    #Afib  -CHADSVASc= 5   -continue diltiazem 120 mg BID and coumadin and ASA  -Warfarin 6 mg  - Trend INR and adjust warfarin PRN    #DM2  -ISS  -Gabapentin 300 mg TID     #HTN  -Metoprolol 100 mg QD   -Cardio recs appreciated- can increase metoprolol to Q12, if HR increases  - Monitoring HR and will increase metoprolol to BID if tachycardia occurs again ; however will hold current dose since pt also bradied down to the 50s    #HLD  -Continue atorvastatin    #Anemia  -resolved  -continue ferrous sulfate add vitamin C  - Continue Dulcolax and colace PRN    #UC  -Continue Uceris   -Continue bentyl   -Continue Colace and dulcolax     #DISPO  - home w/ home PT- face to face to be done

## 2018-11-17 NOTE — PROGRESS NOTE ADULT - SUBJECTIVE AND OBJECTIVE BOX
SUBJECTIVE: Pt seen and examined at bedside. Family at bedside as well.  States LE burning pain has been abated by medication change yesterday. Otherwise, pt did not have nay complaints this a.m.    REVIEW OF SYSTEMS:  CONSTITUTIONAL: No  fevers or chills  EYES/ENT: No visual changes;  No vertigo or throat pain   RESPIRATORY: No cough, wheezing, hemoptysis; No shortness of breath  CARDIOVASCULAR: No chest pain or palpitations  GASTROINTESTINAL: No abdominal or epigastric pain. No nausea, vomiting  SKIN: No itching, burning, rashes, or lesions   All other review of systems is negative unless indicated above    Vital Signs Last 24 Hrs  T(C): 37 (17 Nov 2018 11:29), Max: 37 (17 Nov 2018 11:29)  T(F): 98.6 (17 Nov 2018 11:29), Max: 98.6 (17 Nov 2018 11:29)  HR: 86 (17 Nov 2018 11:29) (73 - 110)  BP: 130/61 (17 Nov 2018 11:29) (130/61 - 132/76)  RR: 18 (17 Nov 2018 11:29) (18 - 20)  SpO2: 96% (17 Nov 2018 11:29) (96% - 97%)    POCT Blood Glucose.: 168 mg/dL (17 Nov 2018 12:35)  POCT Blood Glucose.: 142 mg/dL (17 Nov 2018 07:59)  POCT Blood Glucose.: 194 mg/dL (16 Nov 2018 22:35)  POCT Blood Glucose.: 132 mg/dL (16 Nov 2018 17:06)      PHYSICAL EXAM:  Constitutional: NAD, awake and alert, obese female  Respiratory: Breath sounds are clear bilaterally, No wheezing, rales or rhonchi  Cardiovascular: S1 and S2, regular rate and irregularly irregular rhythm  Gastrointestinal: +Bowel Sounds, soft, nontender, nondistended, no guarding, no rebound  Extremities: +LLE peripheral edema, with erythematous changes consistent with chronic venous insufficiency. RLE tender to palpation and dressing intact   Vascular: 1+ peripheral pulses      MEDICATIONS:  MEDICATIONS  (STANDING):  aspirin  chewable 81 milliGRAM(s) Oral daily  atorvastatin 10 milliGRAM(s) Oral at bedtime  bisacodyl 5 milliGRAM(s) Oral at bedtime  cefepime   IVPB 2000 milliGRAM(s) IV Intermittent every 12 hours  collagenase Ointment 1 Application(s) Topical daily  dextrose 5%. 1000 milliLiter(s) (50 mL/Hr) IV Continuous <Continuous>  dextrose 50% Injectable 12.5 Gram(s) IV Push once  dextrose 50% Injectable 25 Gram(s) IV Push once  dextrose 50% Injectable 25 Gram(s) IV Push once  diltiazem    Tablet 120 milliGRAM(s) Oral two times a day  docusate sodium 100 milliGRAM(s) Oral daily  doxycycline IVPB 100 milliGRAM(s) IV Intermittent every 12 hours  doxycycline IVPB      ferrous    sulfate 325 milliGRAM(s) Oral daily  furosemide   Injectable 80 milliGRAM(s) IV Push every 12 hours  gabapentin 300 milliGRAM(s) Oral three times a day  heparin  Injectable 5000 Unit(s) SubCutaneous every 12 hours  insulin lispro (HumaLOG) corrective regimen sliding scale   SubCutaneous three times a day before meals  insulin lispro (HumaLOG) corrective regimen sliding scale   SubCutaneous at bedtime  metoprolol succinate  milliGRAM(s) Oral daily  montelukast 10 milliGRAM(s) Oral at bedtime  potassium chloride    Tablet ER 40 milliEquivalent(s) Oral two times a day  Uceris (Budesonide EC) 9 milliGRAM(s) 9 milliGRAM(s) Oral daily  warfarin 6 milliGRAM(s) Oral daily      LABS: All Labs Reviewed:                        11.4   7.86  )-----------( 366      ( 17 Nov 2018 04:13 )             35.3     11-17    143  |  107  |  18  ----------------------------<  124<H>  3.5   |  30  |  0.50    Ca    8.1<L>      17 Nov 2018 04:13  Phos  3.2     11-17  Mg     1.6     11-17      PT/INR - ( 17 Nov 2018 04:13 )   PT: 17.6 sec;   INR: 1.56 ratio         PTT - ( 17 Nov 2018 04:13 )  PTT:30.8 sec      Blood Culture: 11-13 @ 19:35  Organism --  Gram Stain Blood -- Gram Stain --  Specimen Source .Urine Clean Catch (Midstream)  Culture-Blood --    11-13 @ 16:00  Organism --  Gram Stain Blood -- Gram Stain --  Specimen Source .Blood None  Culture-Blood --        RADIOLOGY/EKG:    DVT PPX:    ADVANCED DIRECTIVE:    DISPOSITION:

## 2018-11-17 NOTE — PROGRESS NOTE ADULT - ASSESSMENT
68 year old woman with the above history admitted with recurrent LE cellulitis.  Her rapid rates related to her atrial fibrillation and HFpEF are probably secondary to the underlying infection.  Her rates are already improved compared to admission.  Will continue her current medications.  If her rates increase we can increase her metoprolol to Q 12 hours.    11/15/18:  HFpEF/weight gain while in the hospital.  Will change PO furosemide to IV and increase the dose.  KCL as well.    11/17/18:  Back on Lasix and diuresing and feeling better.  No increased SOB.  No anginal chest pains.  AF rate controlled on the monitor on her current meds.  No new cardiac issues so far.

## 2018-11-18 LAB
ALBUMIN SERPL ELPH-MCNC: 2.4 G/DL — LOW (ref 3.3–5)
ANION GAP SERPL CALC-SCNC: 9 MMOL/L — SIGNIFICANT CHANGE UP (ref 5–17)
ANION GAP SERPL CALC-SCNC: 9 MMOL/L — SIGNIFICANT CHANGE UP (ref 5–17)
BUN SERPL-MCNC: 18 MG/DL — SIGNIFICANT CHANGE UP (ref 7–23)
BUN SERPL-MCNC: 22 MG/DL — SIGNIFICANT CHANGE UP (ref 7–23)
CALCIUM SERPL-MCNC: 8.6 MG/DL — SIGNIFICANT CHANGE UP (ref 8.5–10.1)
CALCIUM SERPL-MCNC: 8.6 MG/DL — SIGNIFICANT CHANGE UP (ref 8.5–10.1)
CHLORIDE SERPL-SCNC: 106 MMOL/L — SIGNIFICANT CHANGE UP (ref 96–108)
CHLORIDE SERPL-SCNC: 106 MMOL/L — SIGNIFICANT CHANGE UP (ref 96–108)
CO2 SERPL-SCNC: 26 MMOL/L — SIGNIFICANT CHANGE UP (ref 22–31)
CO2 SERPL-SCNC: 27 MMOL/L — SIGNIFICANT CHANGE UP (ref 22–31)
CREAT SERPL-MCNC: 0.51 MG/DL — SIGNIFICANT CHANGE UP (ref 0.5–1.3)
CREAT SERPL-MCNC: 0.66 MG/DL — SIGNIFICANT CHANGE UP (ref 0.5–1.3)
CULTURE RESULTS: SIGNIFICANT CHANGE UP
CULTURE RESULTS: SIGNIFICANT CHANGE UP
GLUCOSE BLDC GLUCOMTR-MCNC: 148 MG/DL — HIGH (ref 70–99)
GLUCOSE BLDC GLUCOMTR-MCNC: 172 MG/DL — HIGH (ref 70–99)
GLUCOSE BLDC GLUCOMTR-MCNC: 182 MG/DL — HIGH (ref 70–99)
GLUCOSE BLDC GLUCOMTR-MCNC: 226 MG/DL — HIGH (ref 70–99)
GLUCOSE SERPL-MCNC: 126 MG/DL — HIGH (ref 70–99)
GLUCOSE SERPL-MCNC: 177 MG/DL — HIGH (ref 70–99)
HCT VFR BLD CALC: 39.2 % — SIGNIFICANT CHANGE UP (ref 34.5–45)
HGB BLD-MCNC: 12.5 G/DL — SIGNIFICANT CHANGE UP (ref 11.5–15.5)
INR BLD: 1.77 RATIO — HIGH (ref 0.88–1.16)
MAGNESIUM SERPL-MCNC: 1.8 MG/DL — SIGNIFICANT CHANGE UP (ref 1.6–2.6)
MCHC RBC-ENTMCNC: 30.5 PG — SIGNIFICANT CHANGE UP (ref 27–34)
MCHC RBC-ENTMCNC: 31.9 GM/DL — LOW (ref 32–36)
MCV RBC AUTO: 95.6 FL — SIGNIFICANT CHANGE UP (ref 80–100)
NRBC # BLD: 0 /100 WBCS — SIGNIFICANT CHANGE UP (ref 0–0)
PHOSPHATE SERPL-MCNC: 3.3 MG/DL — SIGNIFICANT CHANGE UP (ref 2.5–4.5)
PHOSPHATE SERPL-MCNC: 4.1 MG/DL — SIGNIFICANT CHANGE UP (ref 2.5–4.5)
PLATELET # BLD AUTO: 407 K/UL — HIGH (ref 150–400)
POTASSIUM SERPL-MCNC: 3.4 MMOL/L — LOW (ref 3.5–5.3)
POTASSIUM SERPL-MCNC: 3.8 MMOL/L — SIGNIFICANT CHANGE UP (ref 3.5–5.3)
POTASSIUM SERPL-SCNC: 3.4 MMOL/L — LOW (ref 3.5–5.3)
POTASSIUM SERPL-SCNC: 3.8 MMOL/L — SIGNIFICANT CHANGE UP (ref 3.5–5.3)
PROTHROM AB SERPL-ACNC: 20 SEC — HIGH (ref 10–12.9)
RBC # BLD: 4.1 M/UL — SIGNIFICANT CHANGE UP (ref 3.8–5.2)
RBC # FLD: 13.7 % — SIGNIFICANT CHANGE UP (ref 10.3–14.5)
SODIUM SERPL-SCNC: 141 MMOL/L — SIGNIFICANT CHANGE UP (ref 135–145)
SODIUM SERPL-SCNC: 142 MMOL/L — SIGNIFICANT CHANGE UP (ref 135–145)
SPECIMEN SOURCE: SIGNIFICANT CHANGE UP
SPECIMEN SOURCE: SIGNIFICANT CHANGE UP
WBC # BLD: 8.39 K/UL — SIGNIFICANT CHANGE UP (ref 3.8–10.5)
WBC # FLD AUTO: 8.39 K/UL — SIGNIFICANT CHANGE UP (ref 3.8–10.5)

## 2018-11-18 RX ORDER — POTASSIUM CHLORIDE 20 MEQ
40 PACKET (EA) ORAL ONCE
Qty: 0 | Refills: 0 | Status: COMPLETED | OUTPATIENT
Start: 2018-11-18 | End: 2018-11-18

## 2018-11-18 RX ORDER — MAGNESIUM OXIDE 400 MG ORAL TABLET 241.3 MG
400 TABLET ORAL
Qty: 0 | Refills: 0 | Status: DISCONTINUED | OUTPATIENT
Start: 2018-11-18 | End: 2018-11-20

## 2018-11-18 RX ORDER — WARFARIN SODIUM 2.5 MG/1
1 TABLET ORAL ONCE
Qty: 0 | Refills: 0 | Status: COMPLETED | OUTPATIENT
Start: 2018-11-18 | End: 2018-11-18

## 2018-11-18 RX ADMIN — MORPHINE SULFATE 1 MILLIGRAM(S): 50 CAPSULE, EXTENDED RELEASE ORAL at 21:45

## 2018-11-18 RX ADMIN — MORPHINE SULFATE 1 MILLIGRAM(S): 50 CAPSULE, EXTENDED RELEASE ORAL at 13:20

## 2018-11-18 RX ADMIN — MORPHINE SULFATE 1 MILLIGRAM(S): 50 CAPSULE, EXTENDED RELEASE ORAL at 22:53

## 2018-11-18 RX ADMIN — Medication 325 MILLIGRAM(S): at 12:41

## 2018-11-18 RX ADMIN — Medication 500 MILLIGRAM(S): at 12:41

## 2018-11-18 RX ADMIN — HEPARIN SODIUM 5000 UNIT(S): 5000 INJECTION INTRAVENOUS; SUBCUTANEOUS at 17:18

## 2018-11-18 RX ADMIN — Medication 81 MILLIGRAM(S): at 12:41

## 2018-11-18 RX ADMIN — GABAPENTIN 300 MILLIGRAM(S): 400 CAPSULE ORAL at 16:00

## 2018-11-18 RX ADMIN — Medication 40 MILLIEQUIVALENT(S): at 09:28

## 2018-11-18 RX ADMIN — WARFARIN SODIUM 6 MILLIGRAM(S): 2.5 TABLET ORAL at 21:45

## 2018-11-18 RX ADMIN — WARFARIN SODIUM 1 MILLIGRAM(S): 2.5 TABLET ORAL at 21:45

## 2018-11-18 RX ADMIN — Medication 110 MILLIGRAM(S): at 05:49

## 2018-11-18 RX ADMIN — MORPHINE SULFATE 1 MILLIGRAM(S): 50 CAPSULE, EXTENDED RELEASE ORAL at 12:45

## 2018-11-18 RX ADMIN — Medication 1: at 09:00

## 2018-11-18 RX ADMIN — Medication 110 MILLIGRAM(S): at 18:18

## 2018-11-18 RX ADMIN — ZOLPIDEM TARTRATE 5 MILLIGRAM(S): 10 TABLET ORAL at 23:06

## 2018-11-18 RX ADMIN — Medication 80 MILLIGRAM(S): at 05:56

## 2018-11-18 RX ADMIN — MAGNESIUM OXIDE 400 MG ORAL TABLET 400 MILLIGRAM(S): 241.3 TABLET ORAL at 18:18

## 2018-11-18 RX ADMIN — Medication 1 APPLICATION(S): at 12:42

## 2018-11-18 RX ADMIN — ATORVASTATIN CALCIUM 10 MILLIGRAM(S): 80 TABLET, FILM COATED ORAL at 21:45

## 2018-11-18 RX ADMIN — CEFEPIME 100 MILLIGRAM(S): 1 INJECTION, POWDER, FOR SOLUTION INTRAMUSCULAR; INTRAVENOUS at 20:18

## 2018-11-18 RX ADMIN — Medication 40 MILLIEQUIVALENT(S): at 05:58

## 2018-11-18 RX ADMIN — GABAPENTIN 300 MILLIGRAM(S): 400 CAPSULE ORAL at 21:45

## 2018-11-18 RX ADMIN — HEPARIN SODIUM 5000 UNIT(S): 5000 INJECTION INTRAVENOUS; SUBCUTANEOUS at 05:56

## 2018-11-18 RX ADMIN — MORPHINE SULFATE 1 MILLIGRAM(S): 50 CAPSULE, EXTENDED RELEASE ORAL at 09:28

## 2018-11-18 RX ADMIN — Medication 100 MILLIGRAM(S): at 12:41

## 2018-11-18 RX ADMIN — CEFEPIME 100 MILLIGRAM(S): 1 INJECTION, POWDER, FOR SOLUTION INTRAMUSCULAR; INTRAVENOUS at 07:03

## 2018-11-18 RX ADMIN — Medication 1 APPLICATION(S): at 15:59

## 2018-11-18 RX ADMIN — Medication 40 MILLIEQUIVALENT(S): at 18:18

## 2018-11-18 RX ADMIN — GABAPENTIN 300 MILLIGRAM(S): 400 CAPSULE ORAL at 05:56

## 2018-11-18 RX ADMIN — Medication 100 MILLIGRAM(S): at 05:57

## 2018-11-18 RX ADMIN — MORPHINE SULFATE 1 MILLIGRAM(S): 50 CAPSULE, EXTENDED RELEASE ORAL at 10:10

## 2018-11-18 RX ADMIN — Medication 1: at 18:19

## 2018-11-18 RX ADMIN — MONTELUKAST 10 MILLIGRAM(S): 4 TABLET, CHEWABLE ORAL at 21:45

## 2018-11-18 NOTE — PROGRESS NOTE ADULT - SUBJECTIVE AND OBJECTIVE BOX
Patient is a 68y old  Female who presents with a chief complaint of Fevers (18 Nov 2018 08:46), had right leg cellulitis now improved. No fever or chills.    cefepime   IVPB 2000  doxycycline IVPB 100  doxycycline IVPB   aspirin  chewable 81  cefepime   IVPB 2000  diltiazem    Tablet 120  doxycycline IVPB 100  doxycycline IVPB   furosemide   Injectable 80  heparin  Injectable 5000  metoprolol succinate   warfarin 6    Allergies    penicillin (Hives)    Intolerances        Vital Signs Last 24 Hrs  T(C): 36.9 (18 Nov 2018 11:19), Max: 36.9 (17 Nov 2018 19:28)  T(F): 98.4 (18 Nov 2018 11:19), Max: 98.4 (17 Nov 2018 19:28)  HR: 86 (18 Nov 2018 11:19) (86 - 93)  BP: 122/79 (18 Nov 2018 11:19) (122/79 - 142/76)  BP(mean): --  RR: 18 (18 Nov 2018 11:19) (18 - 18)  SpO2: 97% (18 Nov 2018 11:19) (94% - 97%)  I&O's Detail      Physical Exam:  General: NAD, resting comfortably in bed  Pulmonary: Nonlabored breathing, no respiratory distress  Cardiovascular: NSR  Abdominal: soft, NT/ND  Extremities: WWP, edema with purplish discoloration, wounds clean and dry.        LABS:                        12.5   8.39  )-----------( 407      ( 18 Nov 2018 05:29 )             39.2     11-18    142  |  106  |  18  ----------------------------<  126<H>  3.4<L>   |  27  |  0.51    Ca    8.6      18 Nov 2018 05:29  Phos  3.3     11-18  Mg     1.8     11-18      PT/INR - ( 18 Nov 2018 05:29 )   PT: 20.0 sec;   INR: 1.77 ratio         PTT - ( 17 Nov 2018 04:13 )  PTT:30.8 sec  CAPILLARY BLOOD GLUCOSE      POCT Blood Glucose.: 148 mg/dL (18 Nov 2018 12:34)  POCT Blood Glucose.: 182 mg/dL (18 Nov 2018 07:43)  POCT Blood Glucose.: 119 mg/dL (17 Nov 2018 21:27)  POCT Blood Glucose.: 266 mg/dL (17 Nov 2018 18:05)    Radiology and Additional Studies:    Assessment and Plan: 68yFemaleSEPSIS, CELLULITIS, improved on IV abx. Now doing better, pain better controlled.     -Continue abx per id  -Wound care with silvadene and collagenase  -D/C [planning  -Will continue to follow

## 2018-11-18 NOTE — PROGRESS NOTE ADULT - ASSESSMENT
68 year old woman with the above history admitted with recurrent LE cellulitis.  Her rapid rates related to her atrial fibrillation and HFpEF are probably secondary to the underlying infection.  Her rates are already improved compared to admission.  Will continue her current medications.  If her rates increase we can increase her metoprolol to Q 12 hours.    11/15/18:  HFpEF/weight gain while in the hospital.  Will change PO furosemide to IV and increase the dose.  KCL as well.    11/17/18:  Back on Lasix and diuresing and feeling better.  No increased SOB.  No anginal chest pains.  AF rate controlled on the monitor on her current meds.  No new cardiac issues so far.    11/18/18:  Slowly improving.  Right leg cellulitis being treated but still painful to touch. AF rate controlled.  INR's need to be followed and Coumadin dose adjusted accordingly while on antibiotics and meds.  No new cardiac changes.  Less SOB on Lasix.  Continue as per medicine etal.  Dr Mayer will follow intermittently prn.

## 2018-11-18 NOTE — PROGRESS NOTE ADULT - SUBJECTIVE AND OBJECTIVE BOX
CHIEF COMPLAINT: Patient is a 68y old  Female who presents with a chief complaint of Fevers (13 Nov 2018 23:01)      HPI: 11/14/18:  Patient is 68y F PMHx of DM, CHF, Afib, HFpEF, Venous Insufficiency, Ulcerative Colitis, HTN, HLD, presents to [facility] on 11/13/18, with fevers starting the night before admission and RLE pain. Patient with chronic ulcer of the posterior calf of the RLE, has been causing recurrent cellulitis with multiple hospital admissions since 8/18, patient admits that in the past couple of days her leg has been having increased swelling, erythema and pain. Pain is non-radiating constant severe burning pain with no alleviation to NSAIDs and Percocet. Patient admits that the pain is associated with chills, nausea with heaving no vomiting, as well as shortness of breath and palpitations, found to be in Afib RVR in the ED. Patient also admits some diarrhea but is associated with her UC. (13 Nov 2018 23:01)    11/15/18:  Patient required IV furosemide early this am    11/17/18:  Back on Lasix and diuresing and feeling better.  No increased SOB.  No anginal chest pains.  AF rate controlled on the monitor on her current meds.  No new cardiac issues so far.    11/18/18:  Slowly improving.  Right leg cellulitis being treated but still painful to touch. AF rate controlled.  INR's need to be followed and Coumadin dose adjusted accordingly while on antibiotics and meds.  No new cardiac changes.  Less SOB on Lasix.        PMHx:  PAST MEDICAL & SURGICAL HISTORY:  HTN (hypertension)  Thyroid nodule  Peripheral venous insufficiency  Neuropathy  Morbid obesity with BMI of 40.0-44.9, adult  Dyspnea  Congestive heart failure  Atrial fibrillation  Diabetes mellitus type II, controlled  Status post medial meniscus repair  S/P left knee arthroscopy  Other complications of gastric band procedure  H/O colonoscopy  History of esophagogastroduodenoscopy (EGD)      FAMILY HISTORY:   FAMILY HISTORY:  Family history of diabetes mellitus in mother (Mother)  Family history of breast cancer in mother (Mother)      ALLERGIES:  Allergies  penicillin (Hives)        REVIEW OF SYSTEMS:    CONSTITUTIONAL: No fevers or chills  EYES/ENT: No visual changes;  No vertigo or throat pain   NECK: No pain or stiffness  RESPIRATORY: No hemoptysis;   CARDIOVASCULAR: No chest pain or palpitations  GASTROINTESTINAL: No abdominal or epigastric pain. No nausea, vomiting, or hematemesis; No diarrhea or constipation. No melena or hematochezia.  GENITOURINARY: No dysuria, frequency or hematuria  NEUROLOGICAL: No numbness  All other review of systems is negative unless indicated above    Vital Signs Last 24 Hrs  T(C): 36.7 (18 Nov 2018 06:12), Max: 37 (17 Nov 2018 11:29)  T(F): 98.1 (18 Nov 2018 06:12), Max: 98.6 (17 Nov 2018 11:29)  HR: 92 (18 Nov 2018 06:12) (86 - 93)  BP: 127/90 (18 Nov 2018 06:12) (127/90 - 142/76)  BP(mean): --  RR: 18 (18 Nov 2018 06:12) (18 - 18)  SpO2: 95% (18 Nov 2018 06:12) (94% - 96%)      POCT Blood Glucose.: 182 mg/dL (18 Nov 2018 07:43)  POCT Blood Glucose.: 119 mg/dL (17 Nov 2018 21:27)  POCT Blood Glucose.: 266 mg/dL (17 Nov 2018 18:05)  POCT Blood Glucose.: 168 mg/dL (17 Nov 2018 12:35)      PHYSICAL EXAM:   Constitutional: NAD, awake and alert, well-developed  HEENT: PERR, EOMI, Normal Hearing, MMM  Neck: Soft and supple, No LAD, No JVD  Respiratory: Breath sounds are clear bilaterally, No wheezing, rales or rhonchi  Cardiovascular: S1 and S2, irregular rate and irregular rhythm, soft SHAHAB at LLSB and base as before, no gallops or rubs  Gastrointestinal: Bowel Sounds present, soft, nontender, nondistended, no guarding, no rebound  Extremities: No peripheral edema  Vascular: 2+ peripheral pulses  Neurological: A/O x 3, no focal deficits      MEDICATIONS  (STANDING):  ascorbic acid 500 milliGRAM(s) Oral daily  aspirin  chewable 81 milliGRAM(s) Oral daily  atorvastatin 10 milliGRAM(s) Oral at bedtime  bisacodyl 5 milliGRAM(s) Oral at bedtime  cefepime   IVPB 2000 milliGRAM(s) IV Intermittent every 12 hours  collagenase Ointment 1 Application(s) Topical daily  dextrose 5%. 1000 milliLiter(s) (50 mL/Hr) IV Continuous <Continuous>  dextrose 50% Injectable 12.5 Gram(s) IV Push once  dextrose 50% Injectable 25 Gram(s) IV Push once  dextrose 50% Injectable 25 Gram(s) IV Push once  diltiazem    Tablet 120 milliGRAM(s) Oral two times a day  docusate sodium 100 milliGRAM(s) Oral daily  doxycycline IVPB 100 milliGRAM(s) IV Intermittent every 12 hours  doxycycline IVPB      ferrous    sulfate 325 milliGRAM(s) Oral daily  furosemide   Injectable 80 milliGRAM(s) IV Push every 12 hours  gabapentin 300 milliGRAM(s) Oral three times a day  heparin  Injectable 5000 Unit(s) SubCutaneous every 12 hours  insulin lispro (HumaLOG) corrective regimen sliding scale   SubCutaneous three times a day before meals  insulin lispro (HumaLOG) corrective regimen sliding scale   SubCutaneous at bedtime  metoprolol succinate  milliGRAM(s) Oral daily  montelukast 10 milliGRAM(s) Oral at bedtime  potassium chloride    Tablet ER 40 milliEquivalent(s) Oral two times a day  potassium chloride   Powder 40 milliEquivalent(s) Oral once  Uceris (Budesonide EC) 9 milliGRAM(s) 9 milliGRAM(s) Oral daily  warfarin 6 milliGRAM(s) Oral daily      LABS: All Labs Reviewed:                        12.5   8.39  )-----------( 407      ( 18 Nov 2018 05:29 )             39.2                11.4   7.86  )-----------( 366      ( 17 Nov 2018 04:13 )             35.3       11-18    142  |  106  |  18  ----------------------------<  126<H>  3.4<L>   |  27  |  0.51      11-17    143  |  107  |  18  ----------------------------<  124<H>  3.5   |  30  |  0.50    Ca    8.6      18 Nov 2018 05:29  Ca    8.1<L>      17 Nov 2018 04:13    Phos  3.3     11-18  Phos  3.2     11-17    Mg     1.8     11-18  Mg     1.6     11-17    PT/INR - ( 18 Nov 2018 05:29 )   PT: 20.0 sec;   INR: 1.77 ratio    PT/INR - ( 17 Nov 2018 04:13 )   PT: 17.6 sec;   INR: 1.56 ratio       PTT - ( 17 Nov 2018 04:13 )  PTT:30.8 sec      Serum Pro-Brain Natriuretic Peptide: 1965 pg/mL (11-13 @ 16:33)  Serum Pro-Brain Natriuretic Peptide: 2002 pg/mL (11-13 @ 16:00)      BLOOD CULTURES: Organism --  Gram Stain Blood -- Gram Stain --  Specimen Source .Urine Clean Catch (Midstream)  Culture-Blood --Culture Results:   No growth (11.13.18 @ 19:35)      Organism --  Gram Stain Blood -- Gram Stain --  Specimen Source .Blood None  Culture-Blood --Culture Results:   No growth to date. (11.13.18 @ 16:00)        LIPID PROFILE     RADIOLOGY:    Arterial Doppler Lower Ext:  11/15/18:  No hemodynamically significant stenosis, thrombus or occlusion is identified within the right common femoral artery, femoral artery, popliteal artery, however diffuse monophasic waveforms are visualized.   The right anterior tibial, posterior tibial, peroneal, and dorsalis pedis arteries could not be interrogated secondary to overlying unremovable bandages.    Velocities are as follows (cm/sec):  Common femoral artery: 130  Proximal femoral artery: 202  Mid proximal artery: 143  Distal femoral artery: 1:15  Popliteal artery: 89    IMPRESSION:   No hemodynamically significant stenosis, thrombus or occlusion is identified within the right common femoral artery, femoral artery, popliteal artery, however diffuse monophasic waveforms are visualized.   The right anterior tibial, posterior tibial, peroneal, and dorsalis pedis arteries could not be interrogated secondary to overlying unremovable bandages.        CT of Right Lower Ext:  11/13/18:   CR XR LOWER EXTREMITY RIGHT 11/13/2018 5:45 PM     FINDINGS:    Bones/joints:  Severe tricompartment degenerative changes of the knee. Small partially visualized the patella joint effusion with calcifications. Degenerative changes in the foot. Old nonunited fractures in the medial malleolus. No acute bony abnormality identified. No obvious bony destructive changes. Calcaneal spurs.    Soft tissues:  Extensive diffuse soft tissue edema and skin thickening. No loculated fluid collection. Possible skin ulceration in the posterior calf.    Vasculature:  Arteriosclerosis.     IMPRESSION:   1. Extensive diffuse soft tissue edema and skin thickening. No loculated fluid collection. Possible skin ulceration in the posterior calf.   2. No acute bony abnormality or obvious bony destructive changes.   3. Degenerative changes of the bones.   4. Arteriosclerosis.   5. Other findings as described above.    CT LWR EXT IC RT dated 11/13/2018 6:20 PM     INDICATION: Right lower leg pain and swelling with wound.    COMPARISON: Ankle radiographs dated 11/13/2018    TECHNIQUE: CT imaging of the right lower extremity was performed with contrast. The data was reformatted in the axial, coronal, and sagittal planes.   Contrast: Omnipaque 350. Administered: 150 cc per discarded: 50 cc.    FINDINGS:    OSSEOUS STRUCTURES: There is no cortical erosion or destruction to suggest osteomyelitis. No periosteal reaction is seen. There is severe osteoarthritis of the knee with marked joint space narrowing the patellofemoral compartment. Severe narrowing of the medial knee joint space. Subchondral sclerosis and cystic changes are noted. There are degenerative changes at the midfoot joint with moderate to severe narrowing at the second through fourth TMT's. There are subchondral cystic changes at the second TMT.  SYNOVIUM/ JOINT FLUID: There is partially imaged joint effusion at the knee.  TENDONS: The tendons are intact. No full-thickness tendon tear or retraction is noted.  MUSCLES: There is no intramuscular hematoma. There is a small intramuscular lipoma involving the medial gastrocnemius muscle inferiorly measuring up to 1.6 cm.  NEUROVASCULAR STRUCTURES: There is moderate to severe vascular calcification present.  SUBCUTANEOUS SOFT TISSUES: There is circumferential soft tissue swelling about the calf. There is mild associated skin thickening. There is a small suspected skin defect at the posterior aspect of the lower calf measuring up to 1.4 cm. Skin irregularity is noted along the dorsum of the midfoot and forefoot with overlying bandage. A focal confluent area of hypodensity is appreciated overlying the base of the first metatarsal measuring approximately 1.0 x 1.0 x 3.1 cm (series 2, image 214).    IMPRESSION:  1.  Circumferential soft tissue swelling about the calf with extension   into the foot. Nonspecific but can be seen in the setting of cellulitis.  2.  Focal area of hypodensity in the soft tissues over the base of the   first metatarsal concerning for phlegmonous change versus early abscess   formation.  3.  No CT evidence for osteomyelitis. If concern persists, MRI or bone   scan can be performed.  	    CT of ABD: 11/13/18:  LIVER: Focal fat in the right lobe.  SPLEEN: Normal.  PANCREAS: Normal.  GALLBLADDER/BILIARY TREE: Mild postcholecystectomy dilatation.  ADRENALS: Normal.  KIDNEYS: No calculi, hydronephrosis, or soft tissue attenuating renal mass.  LYMPHADENOPATHY/RETROPERITONEUM: No adenopathy.  VASCULATURE: Aortoiliac atherosclerosis without aneurysm.  BOWEL: No bowel-related abnormality. Specifically, no evidence of acute diverticulitis. Normal appendix and ileocecal region. No obstruction.  PELVIC VISCERA: Decreased size of right adnexal cyst measuring 4.6 cm, previously 5.7 cm.  PELVIC LYMPH NODES: No pelvic adenopathy.  PERITONEUM/ABDOMINAL WALL: No free air or ascites. No fluid collection.  SKELETAL: No acute bony abnormality.  LUNG BASES: Clear.  IMPRESSION:   No evidence of intra-abdominal or pelvic sepsis.    CXR: 11/13/18:  The lungs are clear. There is no pleural effusion. There is no pneumothorax. There is borderline cardiomegaly. There are degenerative changes.    X-ray of right ankle and Tibia:  11/13/18:  RIGHT TIB-FIB:   No acute fracture or dislocation.   Extensive vascular calcifications.  RIGHT ANKLE:   No acute fracture or dislocation.   Prominent posterior and plantar calcaneal spurs.   Extensive vascular calcifications.      X-ray right Foot: 11/13/18:  No acute fracture or dislocation. No bony erosions to suggest acute osteomyelitis.  Moderate forefoot soft tissue swelling. Extensive vascular calcifications. No soft tissue air.  Prominent calcaneal plantar spur.    EKG:  Atrial fibrillation with a rapid VR; PRWP    Telemetry:  Atrial fibrillation with a controlled VR    ECHO:  11/15/18:   Mitral Valve   Mild to moderate mitral regurgitation is present.     Aortic Valve   Significant fibrocalcific changes noted to the aortic valve leaflets with restriction in leaflet excursion. Peak transaortic gradient is 27 mmHg; this finding is consistent with mild aortic stenosis.   Trace aortic regurgitation is present.     Tricuspid Valve   Moderate (2+) tricuspid valve regurgitation is present.     Pulmonic Valve   Normal appearing pulmonic valve structure and function.     Left Atrium   The left atrium is moderately dilated.     Left Ventricle   The left ventricle is normal in size, wall motion and contractility.   Mild concentric left ventricular hypertrophy is present.   Estimated left ventricular ejection fraction is 55-60 %.     Right Atrium   The right atrium appears moderately dilated.     Right Ventricle   Normal appearing right ventricle structure and function.     Pericardial Effusion   No evidence of pericardial effusion.     Pleural Effusion   No evidence of pleural effusion.     Miscellaneous   All visualized extra cardiac structures appears to be normal.     Summary   Mild to moderate mitral regurgitation is present.   Significant fibrocalcific changes noted to the aortic valve leaflets with restriction in leaflet excursion. Peak transaortic gradient is 27 mmHg; this finding is consistent with mild aortic stenosis.   Trace aortic regurgitation is present.   Moderate (2+) tricuspid valve regurgitation is present.   Normal appearing pulmonic valve structure and function.   The left atrium is moderately dilated.   The left ventricle is normal in size, wall motion and contractility.   Mild concentric left ventricular hypertrophy is present.   Estimated left ventricular ejection fraction is 55-60 %.   The right atrium appears moderately dilated.   Normal appearing right ventricle structure and function.   All visualized extra cardiac structures appears to be normal.   No evidence of pericardial effusion.

## 2018-11-18 NOTE — PROGRESS NOTE ADULT - ASSESSMENT
68y F PMHx of DM, CHF, Venous Insufficiency, Ulcerative Colitis, HTN, HLD, recurrent cellulitis presents to [facility] on 11/13/18, with fevers starting the night before admission and RLE pain 2/2 cellulitis.    #RLE Cellulitis   ~in setting of infected R foot hematoma  -monitor temp and WBC  -Continue cefepime 2 g BID and doxy 100mg BID for mrsa coverage  -Blood cx- NGTD, Ucx- NGTD  -ID recs appreciated  -Vascular recs appreciated: -Bilateral LE compression dressing with collagenase and Ace bandage, Pain control, Leg elevation, silver sulfadiazine cream  -Pain meds- morphine 1 mg IV q4h PRN moderate pain, tylenol q6h (Continue Dulcolax and colace)  -Arterial duplex - no stenosis      #CHF ~ pEF   -11/15 - SOB and wheezing overnight -Lasix and duoneb given  -Continue Lasix 80 mg IV BID   -duoneb q6h PRN   - echo- EF 55-60% w/ mild aortic stenosis, mild concentric LV hypertrophy  -continue metoprolol- will increased to BID if tachycardiac occurs      #Afib  -CHADSVASc= 5   -continue diltiazem 120 mg BID and coumadin and ASA  -INR = 1.54 on 11/15  -Continue Warfarin 6 mg , one extra dose given today    #DM2  -ISS  -Gabapentin 300 mg TID       #HTN  -Metoprolol 100 mg QD   -Cardio recs appreciated- can increase metoprolol to Q12, if HR increases  -Discussed monitoring HR today and will increase metoprolol to BID if tachycardiac occurs again     #HLD  -Continue atorvastatin    #Anemia  -resolved  -continue ferrous sulfate       #UC  -Continue Uceris   -Continue bentyl   -Continue Colace and dulcolax     #DISPO  - home w/ home PT- face to face to be done 68y F PMHx of DM, CHF, Venous Insufficiency, Ulcerative Colitis, HTN, HLD, recurrent cellulitis presents to [facility] on 11/13/18, with fevers starting the night before admission and RLE pain 2/2 cellulitis.    #RLE Cellulitis   ~in setting of infected R foot hematoma  -monitor temp and WBC  -Continue cefepime 2 g BID and doxy 100mg BID for mrsa coverage  -Blood cx- NGTD, Ucx- NGTD  -ID recs appreciated  -Vascular recs appreciated: -Bilateral LE compression dressing with collagenase and Ace bandage, Pain control, Leg elevation, silver sulfadiazine cream  -Pain meds- morphine 1 mg IV q4h PRN moderate pain, tylenol q6h (Continue Dulcolax and colace)  -Arterial duplex - no stenosis      #CHF ~ pEF   -11/15 - SOB and wheezing overnight -Lasix and duoneb given  -Continue Lasix 80 mg IV BID   -Metolazone 5 mg PO QD - Monitor BUN/Cr closely  -duoneb q6h PRN   - echo- EF 55-60% w/ mild aortic stenosis, mild concentric LV hypertrophy  -continue metoprolol- will increased to BID if tachycardiac occurs      #Afib  -CHADSVASc= 5   -continue diltiazem 120 mg BID and coumadin and ASA  -INR = 1.54 on 11/15  -Continue Warfarin 6 mg , one extra dose given today    #DM2  -ISS  -Gabapentin 300 mg TID       #HTN  -Metoprolol 100 mg QD   -Cardio recs appreciated- can increase metoprolol to Q12, if HR increases      #HLD  -Continue atorvastatin    #Anemia  -resolved  -continue ferrous sulfate       #UC  -Continue Uceris   -Continue bentyl   -Continue Colace and dulcolax     #DISPO  - home w/ home PT- face to face to be done

## 2018-11-18 NOTE — PROGRESS NOTE ADULT - SUBJECTIVE AND OBJECTIVE BOX
CHIEF COMPLAINT:    SUBJECTIVE:   HPI  11/13/18  Patient is 68y F PMHx of DM, CHF, Venous Insufficiency, Ulcerative Colitis, HTN, HLD, presents to [facility] on 11/13/18, with fevers starting the night before admission and RLE pain. Patient with chronic ulcer of the posterior calf of the RLE, has been causing recurrent cellulitis with multiple hospital admissions since 8/18, patient admits that in the past couple of days her leg has been having increased swelling, erythema and pain. Pain is non-radiating constant severe burning pain with no alleviation to NSAIDs and Percocet. Patient admits that the pain is associated with chills, nausea with heaving no vomiting, as well as shortness of breath and palpitations, found to be in Afib RVR in the ED. Patient also admits some diarrhea but is associated with her UC.      ---  SUBJECTIVE   11/14/18  Patient was seen and evaluated at bedside. States RLE pain has been 10/10 with burning sensation and chills also present. No other complaints at this time. Potassium was repleted this AM.     -----  11/15/18  Overnight, patient desaturated to low 90s and was wheezing. Patient was given Lasix STAT and duoneb treatment. Lasix was subsequently ordered for 80 mg IV BID.  Patient seen today, breathing and wheezing improved and still complaining of RLE pain , with burning sensation.   K repleted in the AM.    -----  11/16/18    Overnight, patient on tele in afib with HR range 70-130s and some PVCs. Vitals otherwise stable. No wheezing or SOB. Patient reports leg pain/ burning is still severe. Discussed increasing gabapentin to 300 mg TID. She also c/o tremors that have recently started. Told patient that can be side effect of duoneb and changed order to PRN duoneb. INR today <2 and warfarin ordered for 6 mg.  Patient states she takes potassium supplements at home, and ordered was placed today.    ----  11/18/18  Pt seen and evaluated today. States pain is still present in RLE similar to days before, burning sensation still present.      REVIEW OF SYSTEMS:    CONSTITUTIONAL: No weakness, no fevers , no chills  EYES/ENT: No visual changes;  No vertigo or throat pain   NECK: No pain or stiffness  RESPIRATORY: No cough, wheezing, hemoptysis; No shortness of breath  CARDIOVASCULAR: No chest pain or palpitations  GASTROINTESTINAL: No abdominal or epigastric pain. No nausea, vomiting, or hematemesis; No diarrhea or constipation. No melena or hematochezia.  GENITOURINARY: No dysuria, frequency or hematuria  NEUROLOGICAL: No numbness or weakness  SKIN: No itching, burning, rashes, or lesions   EXTREMITIES: RLE pain and burning sensation   All other review of systems is negative unless indicated above    Vital Signs Last 24 Hrs  T(C): 36.8 (18 Nov 2018 15:58), Max: 36.9 (17 Nov 2018 19:28)  T(F): 98.3 (18 Nov 2018 15:58), Max: 98.4 (17 Nov 2018 19:28)  HR: 95 (18 Nov 2018 15:58) (86 - 95)  BP: 136/77 (18 Nov 2018 15:58) (122/79 - 142/76)  BP(mean): --  RR: 18 (18 Nov 2018 15:58) (18 - 18)  SpO2: 96% (18 Nov 2018 15:58) (94% - 97%)    I&O's Summary    CAPILLARY BLOOD GLUCOSE    CAPILLARY BLOOD GLUCOSE      POCT Blood Glucose.: 148 mg/dL (18 Nov 2018 12:34)  POCT Blood Glucose.: 182 mg/dL (18 Nov 2018 07:43)  POCT Blood Glucose.: 119 mg/dL (17 Nov 2018 21:27)  POCT Blood Glucose.: 266 mg/dL (17 Nov 2018 18:05)    PHYSICAL EXAM:    Constitutional: NAD, awake and alert, obese  Respiratory: Breath sounds are clear bilaterally, No wheezing, rales or rhonchi  Cardiovascular: S1 and S2, regular rate and rhythm, no Murmurs, gallops or rubs  Gastrointestinal: Bowel Sounds present, soft, nontender, nondistended, no guarding, no rebound  Extremities: LLE peripheral edema noted, with erythematous changes consistent with venous insufficiency. RLE less erythematous,  to palpation. Venous stasis ulcer on R posterior tibia region and anterior tibial region (dark eschar like appearance). R dorsal foot hematoma improved. B/L lower extremities less edematous.   Vascular: 1+ peripheral pulses      MEDICATIONS  (STANDING):  ascorbic acid 500 milliGRAM(s) Oral daily  aspirin  chewable 81 milliGRAM(s) Oral daily  atorvastatin 10 milliGRAM(s) Oral at bedtime  bisacodyl 5 milliGRAM(s) Oral at bedtime  cefepime   IVPB 2000 milliGRAM(s) IV Intermittent every 12 hours  collagenase Ointment 1 Application(s) Topical daily  dextrose 5%. 1000 milliLiter(s) (50 mL/Hr) IV Continuous <Continuous>  dextrose 50% Injectable 12.5 Gram(s) IV Push once  dextrose 50% Injectable 25 Gram(s) IV Push once  dextrose 50% Injectable 25 Gram(s) IV Push once  diltiazem    Tablet 120 milliGRAM(s) Oral two times a day  docusate sodium 100 milliGRAM(s) Oral daily  doxycycline IVPB 100 milliGRAM(s) IV Intermittent every 12 hours  doxycycline IVPB      ferrous    sulfate 325 milliGRAM(s) Oral daily  furosemide   Injectable 80 milliGRAM(s) IV Push every 12 hours  gabapentin 300 milliGRAM(s) Oral three times a day  heparin  Injectable 5000 Unit(s) SubCutaneous every 12 hours  insulin lispro (HumaLOG) corrective regimen sliding scale   SubCutaneous three times a day before meals  insulin lispro (HumaLOG) corrective regimen sliding scale   SubCutaneous at bedtime  magnesium oxide 400 milliGRAM(s) Oral two times a day with meals  metolazone 5 milliGRAM(s) Oral daily  metoprolol succinate  milliGRAM(s) Oral daily  montelukast 10 milliGRAM(s) Oral at bedtime  potassium chloride    Tablet ER 40 milliEquivalent(s) Oral two times a day  silver sulfADIAZINE 1% Cream 1 Application(s) Topical daily  Uceris (Budesonide EC) 9 milliGRAM(s) 9 milliGRAM(s) Oral daily  warfarin 6 milliGRAM(s) Oral daily    MEDICATIONS  (PRN):  acetaminophen   Tablet .. 650 milliGRAM(s) Oral every 6 hours PRN Temp greater or equal to 38C (100.4F)  ALBUTerol/ipratropium for Nebulization 3 milliLiter(s) Nebulizer every 6 hours PRN Shortness of Breath and/or Wheezing  dextrose 40% Gel 15 Gram(s) Oral once PRN Blood Glucose LESS THAN 70 milliGRAM(s)/deciliter  dicyclomine 20 milliGRAM(s) Oral daily PRN abdominal pain  glucagon  Injectable 1 milliGRAM(s) IntraMuscular once PRN Glucose LESS THAN 70 milligrams/deciliter  morphine  - Injectable 1 milliGRAM(s) IV Push every 4 hours PRN Moderate Pain (4 - 6)  zolpidem 5 milliGRAM(s) Oral at bedtime PRN Insomnia      LABS: All Labs Reviewed:                                     12.5   8.39  )-----------( 407      ( 18 Nov 2018 05:29 )             39.2       11-18    142  |  106  |  18  ----------------------------<  126<H>  3.4<L>   |  27  |  0.51    Ca    8.6      18 Nov 2018 05:29  Phos  3.3     11-18  Mg     1.8     11-18         PTT - ( 13 Nov 2018 16:00 )  PTT:39.1 sec  CARDIAC MARKERS ( 14 Nov 2018 05:25 )  x     / x     / 41 U/L / x     / x      CARDIAC MARKERS ( 13 Nov 2018 20:23 )  <0.015 ng/mL / x     / x     / x     / x      CARDIAC MARKERS ( 13 Nov 2018 16:33 )  <0.015 ng/mL / x     / 44 U/L / x     / x      CARDIAC MARKERS ( 13 Nov 2018 16:00 )  <0.015 ng/mL / x     / x     / x     / x          Blood Culture:     RADIOLOGY/EKG:    CT LOWER EXTREMITY W/ IV CONT        IMPRESSION:    1.  Circumferential soft tissue swelling about the calf with extension   into the foot. Nonspecific but can be seen in the setting of cellulitis.  2.  Focal area of hypodensity in the soft tissues over the base of the   first metatarsal concerning for phlegmonous change versus early abscess   formation.  3.  No CT evidence for osteomyelitis. If concern persists, MRI or bone   scan can be performed.    ---  TTE 11/15/18   Impression     Summary     Mild to moderate mitral regurgitation is present.   Significant fibrocalcific changes noted to the aortic valve leaflets with   restriction in leaflet excursion. Peak transaortic gradient is 27 mmHg;   this finding is consistent with mild aortic stenosis.   Trace aortic regurgitation is present.   Moderate (2+) tricuspid valve regurgitation is present.   Normal appearing pulmonic valve structure and function.   The left atrium is moderately dilated.   The left ventricle is normal in size, wall motion and contractility.   Mild concentric left ventricular hypertrophy is present.   Estimated left ventricular ejection fraction is 55-60 %.   The right atrium appears moderately dilated.   Normal appearing right ventricle structure and function.   All visualized extra cardiac structures appears to be normal.   No evidence of pericardial effusion.    DVT PPX: Hep SQ BID    ADVANCED DIRECTIVE:    DISPOSITION: home w/ home PT- face to face to be done

## 2018-11-18 NOTE — PROGRESS NOTE ADULT - ASSESSMENT
68y F PMHx of DM, CHF, Venous Insufficiency, Ulcerative Colitis, HTN, HLD admitted 11/13/18, with fevers starting the night before admission and RLE pain. Patient with chronic ulcer of the posterior calf of the RLE, has been causing recurrent cellulitis with multiple hospital admissions since 8/18, patient admits that in the past couple of days her leg has been having increased swelling, erythema and pain. Pain is non-radiating constant severe burning pain with no alleviation to NSAIDs and Percocet. Patient admits that the pain is associated with chills, nausea with heaving no vomiting, as well as shortness of breath and palpitations, found to be in Afib RVR in the ED with elevated LA/wbc ct 23, was given IV vancomycin/cefepime for cellulitis.     1. sepsis/chronic b/l LE stasis dermatitis/superimposed RLE cellulitis/PVD/obesity  - slowly improving  - on cefepime [id number] for gnr resistant coverage #5  - on doxy 100mg BID for mrsa coverage #5  - continue with abx coverage  - cx no growth  - wound care/vascular eval appreciated  - plan for dc tomorrow with oral doxy to complete 7-10 day course  - monitor temps  - tolerating abx well so far; no side effects noted  - reason for abx use and side effects reviewed with patient  - supportive care  - f/u cbc    2. other issues - care per medicine

## 2018-11-18 NOTE — PROGRESS NOTE ADULT - SUBJECTIVE AND OBJECTIVE BOX
Date of service: 11-18-18 @ 18:08    pt seen and examined  no fevers  LEs eval with vascular this am  RLE less swollen/improving     ROS: no fever or chills; denies dizziness, no HA, no SOB or cough, no abdominal pain, no diarrhea or constipation; no dysuria, no urinary frequency, no legs pain, no rashes    Vital Signs Last 24 Hrs  T(C): 36.8 (18 Nov 2018 15:58), Max: 36.9 (17 Nov 2018 19:28)  T(F): 98.3 (18 Nov 2018 15:58), Max: 98.4 (17 Nov 2018 19:28)  HR: 95 (18 Nov 2018 15:58) (86 - 95)  BP: 136/77 (18 Nov 2018 15:58) (122/79 - 142/76)  BP(mean): --  RR: 18 (18 Nov 2018 15:58) (18 - 18)  SpO2: 96% (18 Nov 2018 15:58) (94% - 97%)      PE:  Constitutional: frail looking  HEENT: NC/AT, EOMI, PERRLA, conjunctivae clear; ears and nose atraumatic; pharynx benign  Neck: supple; thyroid not palpable  Back: no tenderness  Respiratory: respiratory effort normal; clear to auscultation  Cardiovascular: S1S2 regular, no murmurs  Abdomen: soft, not tender, not distended, positive BS; liver and spleen WNL  Genitourinary: no suprapubic tenderness  Lymphatic: no LN palpable  Musculoskeletal: no muscle tenderness, no joint swelling or tenderness  Extremities:  pedal/b/l 2-3 + edema  Neurological/ Psychiatric: Judgement and insight normal;  moving all extremities  Skin: LLE stasis dermatitis changes, no erythema Right calf ulcer w/ discharge surrounding erythema/edema/warmth no palpable lesions    Labs: all available labs reviewed                                              12.5   8.39  )-----------( 407      ( 18 Nov 2018 05:29 )             39.2     11-18    141  |  106  |  22  ----------------------------<  177<H>  3.8   |  26  |  0.66    Ca    8.6      18 Nov 2018 16:12  Phos  4.1     11-18  Mg     1.8     11-18    TPro  x   /  Alb  2.4<L>  /  TBili  x   /  DBili  x   /  AST  x   /  ALT  x   /  AlkPhos  x   11-18             Culture - Urine (11.13.18 @ 19:35)    Specimen Source: .Urine Clean Catch (Midstream)    Culture Results:   No growth    Culture - Blood (11.13.18 @ 16:00)    Specimen Source: .Blood None    Culture Results:   No growth to date.          Radiology: all available radiological tests reviewed  < from: CT Lower Extremity w/ IV Cont, Right (11.13.18 @ 18:20) >    EXAM:  CT LWR EXT IC RT                            PROCEDURE DATE:  11/13/2018          INTERPRETATION:  [facility] RADIOLOGIST PRELIMINARY REPORT    EXAM:    CT Right Lower Extremity With IV Contrast, Tibia and Fibula     EXAM DATE/TIME:    11/13/2018 6:31 PM     CLINICAL HISTORY:    68 years old, female; Pain; Lower leg; Right     TECHNIQUE:    CT of the Right lower extremity with intravenous contrast was performed.   Exam   focused on the tibia and fibula.    Coronal and sagittal reformatted images were created and reviewed.     COMPARISON:    CR XR LOWER EXTREMITY RIGHT 11/13/2018 5:45 PM     FINDINGS:    Bones/joints:  Severe tricompartment degenerative changes of the knee.   Small   partially visualized the patella joint effusion with calcifications.   Degenerative changes in the foot. Old nonunited fractures in the medial   malleolus. No acute bony abnormality identified. No obvious bony   destructive   changes. Calcaneal spurs.    Soft tissues:  Extensive diffuse soft tissue edema andskin thickening.   No   loculated fluid collection. Possible skin ulceration in the posterior   calf.    Vasculature:  Arteriosclerosis.     IMPRESSION:   1. Extensive diffuse soft tissue edema and skin thickening. No loculated   fluid   collection. Possible skin ulceration in the posterior calf.   2. No acute bony abnormality or obvious bony destructive changes.   3. Degenerative changes of the bones.   4. Arteriosclerosis.   5. Other findings as described above.    CT LWR EXT IC RT dated 11/13/2018 6:20 PM     INDICATION: Right lower leg pain and swelling with wound.    COMPARISON: Ankle radiographs dated 11/13/2018    TECHNIQUE: CT imaging of the right lower extremity was performed with   contrast. The data was reformatted in the axial, coronal, and sagittal   planes.   Contrast: Omnipaque 350. Administered: 150 cc per discarded: 50 cc.    FINDINGS:    OSSEOUS STRUCTURES: There is no cortical erosion or destruction to   suggest osteomyelitis. No periosteal reaction is seen. There is severe   osteoarthritis of the knee with marked joint space narrowing the   patellofemoral compartment. Severe narrowing of the medial knee joint   space. Subchondral sclerosis and cystic changes are noted. There are   degenerative changes at the midfoot joint with moderate to severe   narrowing at the second through fourth TMT's. There are subchondral   cystic changes at the second TMT.  SYNOVIUM/ JOINT FLUID: There is partially imaged joint effusion at the   knee.  TENDONS: The tendons are intact.No full-thickness tendon tear or   retraction is noted.  MUSCLES: There is no intramuscular hematoma. There is a small   intramuscular lipoma involving the medial gastrocnemius muscle inferiorly   measuring up to 1.6 cm.  NEUROVASCULAR STRUCTURES: There is moderate to severe vascular   calcification present.  SUBCUTANEOUS SOFT TISSUES: There is circumferential soft tissue swelling   about the calf. There is mild associated skin thickening. There is a   small suspected skin defect at the posterioraspect of the lower calf   measuring up to 1.4 cm. Skin irregularity is noted along the dorsum of   the midfoot and forefoot with overlying bandage. A focal confluent area   of hypodensity is appreciated overlying the base of the first metatarsal   measuring approximately 1.0 x 1.0 x 3.1 cm (series 2, image 214).      IMPRESSION:    1.  Circumferential soft tissue swelling about the calf with extension   into the foot. Nonspecific but can be seen in the setting of cellulitis.  2.  Focal area of hypodensity in the soft tissues over the base of the   first metatarsal concerning for phlegmonous change versus early abscess   formation.  3.  No CT evidence for osteomyelitis. If concern persists, MRI or bone   scan can be performed.    < end of copied text >    Advanced directives addressed: full resuscitation

## 2018-11-18 NOTE — PROGRESS NOTE ADULT - SUBJECTIVE AND OBJECTIVE BOX
Pt has been seen and examined with FP resident, resident supervised agree with a/p       Patient is a 68y old  Female who presents with a chief complaint of Fevers (18 Nov 2018 13:09)      PHYSICAL EXAM:  Vital Signs Last 24 Hrs  T(C): 36.9 (18 Nov 2018 11:19), Max: 36.9 (17 Nov 2018 19:28)  T(F): 98.4 (18 Nov 2018 11:19), Max: 98.4 (17 Nov 2018 19:28)  HR: 86 (18 Nov 2018 11:19) (86 - 93)  BP: 122/79 (18 Nov 2018 11:19) (122/79 - 142/76)  BP(mean): --  RR: 18 (18 Nov 2018 11:19) (18 - 18)  SpO2: 97% (18 Nov 2018 11:19) (94% - 97%)  general- comfortable   -rs-aeeb,cta  -cvs-s1s2 normal   -p/a-soft,bs+          A/P    #ct abx, add metolazone and monitor her electrolytes and renal function closely     #Discussed with Dr. Jimenez

## 2018-11-19 ENCOUNTER — TRANSCRIPTION ENCOUNTER (OUTPATIENT)
Age: 68
End: 2018-11-19

## 2018-11-19 LAB
ALBUMIN SERPL ELPH-MCNC: 2.4 G/DL — LOW (ref 3.3–5)
ALBUMIN SERPL ELPH-MCNC: 2.6 G/DL — LOW (ref 3.3–5)
ANION GAP SERPL CALC-SCNC: 10 MMOL/L — SIGNIFICANT CHANGE UP (ref 5–17)
ANION GAP SERPL CALC-SCNC: 10 MMOL/L — SIGNIFICANT CHANGE UP (ref 5–17)
BUN SERPL-MCNC: 19 MG/DL — SIGNIFICANT CHANGE UP (ref 7–23)
BUN SERPL-MCNC: 24 MG/DL — HIGH (ref 7–23)
CALCIUM SERPL-MCNC: 8.7 MG/DL — SIGNIFICANT CHANGE UP (ref 8.5–10.1)
CALCIUM SERPL-MCNC: 8.9 MG/DL — SIGNIFICANT CHANGE UP (ref 8.5–10.1)
CHLORIDE SERPL-SCNC: 101 MMOL/L — SIGNIFICANT CHANGE UP (ref 96–108)
CHLORIDE SERPL-SCNC: 99 MMOL/L — SIGNIFICANT CHANGE UP (ref 96–108)
CO2 SERPL-SCNC: 27 MMOL/L — SIGNIFICANT CHANGE UP (ref 22–31)
CO2 SERPL-SCNC: 28 MMOL/L — SIGNIFICANT CHANGE UP (ref 22–31)
CREAT SERPL-MCNC: 0.55 MG/DL — SIGNIFICANT CHANGE UP (ref 0.5–1.3)
CREAT SERPL-MCNC: 0.86 MG/DL — SIGNIFICANT CHANGE UP (ref 0.5–1.3)
GLUCOSE BLDC GLUCOMTR-MCNC: 176 MG/DL — HIGH (ref 70–99)
GLUCOSE BLDC GLUCOMTR-MCNC: 180 MG/DL — HIGH (ref 70–99)
GLUCOSE BLDC GLUCOMTR-MCNC: 207 MG/DL — HIGH (ref 70–99)
GLUCOSE BLDC GLUCOMTR-MCNC: 209 MG/DL — HIGH (ref 70–99)
GLUCOSE SERPL-MCNC: 156 MG/DL — HIGH (ref 70–99)
GLUCOSE SERPL-MCNC: 214 MG/DL — HIGH (ref 70–99)
HCT VFR BLD CALC: 39.8 % — SIGNIFICANT CHANGE UP (ref 34.5–45)
HGB BLD-MCNC: 12.8 G/DL — SIGNIFICANT CHANGE UP (ref 11.5–15.5)
INR BLD: 1.81 RATIO — HIGH (ref 0.88–1.16)
MAGNESIUM SERPL-MCNC: 1.9 MG/DL — SIGNIFICANT CHANGE UP (ref 1.6–2.6)
MCHC RBC-ENTMCNC: 30.1 PG — SIGNIFICANT CHANGE UP (ref 27–34)
MCHC RBC-ENTMCNC: 32.2 GM/DL — SIGNIFICANT CHANGE UP (ref 32–36)
MCV RBC AUTO: 93.6 FL — SIGNIFICANT CHANGE UP (ref 80–100)
NRBC # BLD: 0 /100 WBCS — SIGNIFICANT CHANGE UP (ref 0–0)
PHOSPHATE SERPL-MCNC: 3.5 MG/DL — SIGNIFICANT CHANGE UP (ref 2.5–4.5)
PHOSPHATE SERPL-MCNC: 3.5 MG/DL — SIGNIFICANT CHANGE UP (ref 2.5–4.5)
PHOSPHATE SERPL-MCNC: 4.5 MG/DL — SIGNIFICANT CHANGE UP (ref 2.5–4.5)
PLATELET # BLD AUTO: 465 K/UL — HIGH (ref 150–400)
POTASSIUM SERPL-MCNC: 3.9 MMOL/L — SIGNIFICANT CHANGE UP (ref 3.5–5.3)
POTASSIUM SERPL-MCNC: 3.9 MMOL/L — SIGNIFICANT CHANGE UP (ref 3.5–5.3)
POTASSIUM SERPL-SCNC: 3.9 MMOL/L — SIGNIFICANT CHANGE UP (ref 3.5–5.3)
POTASSIUM SERPL-SCNC: 3.9 MMOL/L — SIGNIFICANT CHANGE UP (ref 3.5–5.3)
PROTHROM AB SERPL-ACNC: 20.5 SEC — HIGH (ref 10–12.9)
RBC # BLD: 4.25 M/UL — SIGNIFICANT CHANGE UP (ref 3.8–5.2)
RBC # FLD: 13.6 % — SIGNIFICANT CHANGE UP (ref 10.3–14.5)
SODIUM SERPL-SCNC: 137 MMOL/L — SIGNIFICANT CHANGE UP (ref 135–145)
SODIUM SERPL-SCNC: 138 MMOL/L — SIGNIFICANT CHANGE UP (ref 135–145)
WBC # BLD: 10.37 K/UL — SIGNIFICANT CHANGE UP (ref 3.8–10.5)
WBC # FLD AUTO: 10.37 K/UL — SIGNIFICANT CHANGE UP (ref 3.8–10.5)

## 2018-11-19 RX ORDER — ASCORBIC ACID 60 MG
1 TABLET,CHEWABLE ORAL
Qty: 0 | Refills: 0 | DISCHARGE
Start: 2018-11-19

## 2018-11-19 RX ORDER — WARFARIN SODIUM 2.5 MG/1
1 TABLET ORAL ONCE
Qty: 0 | Refills: 0 | Status: COMPLETED | OUTPATIENT
Start: 2018-11-19 | End: 2018-11-19

## 2018-11-19 RX ORDER — COLLAGENASE CLOSTRIDIUM HIST. 250 UNIT/G
1 OINTMENT (GRAM) TOPICAL
Qty: 0 | Refills: 0 | DISCHARGE
Start: 2018-11-19

## 2018-11-19 RX ORDER — DOCUSATE SODIUM 100 MG
1 CAPSULE ORAL
Qty: 0 | Refills: 0 | DISCHARGE
Start: 2018-11-19

## 2018-11-19 RX ADMIN — Medication 40 MILLIEQUIVALENT(S): at 06:27

## 2018-11-19 RX ADMIN — CEFEPIME 100 MILLIGRAM(S): 1 INJECTION, POWDER, FOR SOLUTION INTRAMUSCULAR; INTRAVENOUS at 06:30

## 2018-11-19 RX ADMIN — ATORVASTATIN CALCIUM 10 MILLIGRAM(S): 80 TABLET, FILM COATED ORAL at 22:52

## 2018-11-19 RX ADMIN — Medication 100 MILLIGRAM(S): at 20:57

## 2018-11-19 RX ADMIN — Medication 80 MILLIGRAM(S): at 06:27

## 2018-11-19 RX ADMIN — HEPARIN SODIUM 5000 UNIT(S): 5000 INJECTION INTRAVENOUS; SUBCUTANEOUS at 06:27

## 2018-11-19 RX ADMIN — Medication 100 MILLIGRAM(S): at 06:27

## 2018-11-19 RX ADMIN — GABAPENTIN 300 MILLIGRAM(S): 400 CAPSULE ORAL at 13:45

## 2018-11-19 RX ADMIN — MAGNESIUM OXIDE 400 MG ORAL TABLET 400 MILLIGRAM(S): 241.3 TABLET ORAL at 20:57

## 2018-11-19 RX ADMIN — WARFARIN SODIUM 1 MILLIGRAM(S): 2.5 TABLET ORAL at 22:53

## 2018-11-19 RX ADMIN — GABAPENTIN 300 MILLIGRAM(S): 400 CAPSULE ORAL at 22:52

## 2018-11-19 RX ADMIN — Medication 2: at 17:11

## 2018-11-19 RX ADMIN — Medication 325 MILLIGRAM(S): at 13:45

## 2018-11-19 RX ADMIN — Medication 5 MILLIGRAM(S): at 22:52

## 2018-11-19 RX ADMIN — Medication 1 APPLICATION(S): at 13:49

## 2018-11-19 RX ADMIN — MORPHINE SULFATE 1 MILLIGRAM(S): 50 CAPSULE, EXTENDED RELEASE ORAL at 23:20

## 2018-11-19 RX ADMIN — Medication 1 APPLICATION(S): at 16:36

## 2018-11-19 RX ADMIN — Medication 1: at 13:47

## 2018-11-19 RX ADMIN — Medication 80 MILLIGRAM(S): at 16:53

## 2018-11-19 RX ADMIN — WARFARIN SODIUM 6 MILLIGRAM(S): 2.5 TABLET ORAL at 22:53

## 2018-11-19 RX ADMIN — Medication 81 MILLIGRAM(S): at 13:45

## 2018-11-19 RX ADMIN — GABAPENTIN 300 MILLIGRAM(S): 400 CAPSULE ORAL at 06:30

## 2018-11-19 RX ADMIN — MORPHINE SULFATE 1 MILLIGRAM(S): 50 CAPSULE, EXTENDED RELEASE ORAL at 23:09

## 2018-11-19 RX ADMIN — MONTELUKAST 10 MILLIGRAM(S): 4 TABLET, CHEWABLE ORAL at 22:52

## 2018-11-19 RX ADMIN — Medication 100 MILLIGRAM(S): at 13:46

## 2018-11-19 RX ADMIN — Medication 40 MILLIEQUIVALENT(S): at 16:53

## 2018-11-19 RX ADMIN — MAGNESIUM OXIDE 400 MG ORAL TABLET 400 MILLIGRAM(S): 241.3 TABLET ORAL at 09:37

## 2018-11-19 RX ADMIN — Medication 1: at 08:15

## 2018-11-19 RX ADMIN — Medication 500 MILLIGRAM(S): at 13:45

## 2018-11-19 NOTE — DISCHARGE NOTE ADULT - CARE PROVIDER_API CALL
Justo Mayer), Cardiovascular Disease; Internal Medicine  [address]  [address]  [place] [address]  Phone: [phone]  Fax: [phone]    Denis Breaux), Surgery; Vascular Surgery  [address]  [address] [place] [address]  Phone: [phone]  Fax: [phone]

## 2018-11-19 NOTE — DISCHARGE NOTE ADULT - ADDITIONAL INSTRUCTIONS
If fevers > 100.4 F, chills, chest pain, shortness of breath, worsening b/l lower extremity pain and swelling return to the Emergency Department. Please take doxycycline 100 mg 2x/day for 10 days.  Follow up with Dr. Adrienne Yoo , Vascular doctor, and Primary doctor, Dr. Mayer after discharge.

## 2018-11-19 NOTE — DISCHARGE NOTE ADULT - SECONDARY DIAGNOSIS.
Acute on chronic diastolic congestive heart failure Atrial fibrillation Controlled type 2 diabetes mellitus without complication, unspecified whether long term insulin use HTN (hypertension) Hyperlipidemia associated with type 2 diabetes mellitus Neuropathy

## 2018-11-19 NOTE — PROGRESS NOTE ADULT - SUBJECTIVE AND OBJECTIVE BOX
Date of service: 11-19-18 @ 11:04    pt seen and examined  no fevers  RLE less swollen/improving     ROS: no fever or chills; denies dizziness, no HA, no SOB or cough, no abdominal pain, no diarrhea or constipation; no dysuria, no urinary frequency, no legs pain, no rashes    MEDICATIONS  (STANDING):  ascorbic acid 500 milliGRAM(s) Oral daily  aspirin  chewable 81 milliGRAM(s) Oral daily  atorvastatin 10 milliGRAM(s) Oral at bedtime  bisacodyl 5 milliGRAM(s) Oral at bedtime  collagenase Ointment 1 Application(s) Topical daily  dextrose 5%. 1000 milliLiter(s) (50 mL/Hr) IV Continuous <Continuous>  dextrose 50% Injectable 12.5 Gram(s) IV Push once  dextrose 50% Injectable 25 Gram(s) IV Push once  dextrose 50% Injectable 25 Gram(s) IV Push once  diltiazem    Tablet 120 milliGRAM(s) Oral two times a day  docusate sodium 100 milliGRAM(s) Oral daily  doxycycline hyclate Capsule 100 milliGRAM(s) Oral every 12 hours  ferrous    sulfate 325 milliGRAM(s) Oral daily  furosemide   Injectable 80 milliGRAM(s) IV Push every 12 hours  gabapentin 300 milliGRAM(s) Oral three times a day  heparin  Injectable 5000 Unit(s) SubCutaneous every 12 hours  insulin lispro (HumaLOG) corrective regimen sliding scale   SubCutaneous three times a day before meals  insulin lispro (HumaLOG) corrective regimen sliding scale   SubCutaneous at bedtime  magnesium oxide 400 milliGRAM(s) Oral two times a day with meals  metolazone 5 milliGRAM(s) Oral daily  metoprolol succinate  milliGRAM(s) Oral daily  montelukast 10 milliGRAM(s) Oral at bedtime  potassium chloride    Tablet ER 40 milliEquivalent(s) Oral two times a day  silver sulfADIAZINE 1% Cream 1 Application(s) Topical daily  Uceris (Budesonide EC) 9 milliGRAM(s) 9 milliGRAM(s) Oral daily  warfarin 6 milliGRAM(s) Oral daily      Vital Signs Last 24 Hrs  T(C): 36.7 (19 Nov 2018 08:12), Max: 36.9 (18 Nov 2018 11:19)  T(F): 98 (19 Nov 2018 08:12), Max: 98.4 (18 Nov 2018 11:19)  HR: 88 (19 Nov 2018 08:12) (82 - 95)  BP: 120/78 (19 Nov 2018 08:12) (117/50 - 148/66)  BP(mean): --  RR: 20 (19 Nov 2018 08:12) (18 - 20)  SpO2: 96% (19 Nov 2018 08:12) (95% - 98%)          PE:  Constitutional: frail looking  HEENT: NC/AT, EOMI, PERRLA, conjunctivae clear; ears and nose atraumatic; pharynx benign  Neck: supple; thyroid not palpable  Back: no tenderness  Respiratory: respiratory effort normal; clear to auscultation  Cardiovascular: S1S2 regular, no murmurs  Abdomen: soft, not tender, not distended, positive BS; liver and spleen WNL  Genitourinary: no suprapubic tenderness  Lymphatic: no LN palpable  Musculoskeletal: no muscle tenderness, no joint swelling or tenderness  Extremities:  pedal/b/l 2-3 + edema  Neurological/ Psychiatric: Judgement and insight normal;  moving all extremities  Skin: LLE stasis dermatitis changes, no erythema Right calf ulcer, resolving erythema, no warmth no palpable lesions    Labs: all available labs reviewed                                                         12.8   10.37 )-----------( 465      ( 19 Nov 2018 05:29 )             39.8     11-19    138  |  101  |  19  ----------------------------<  156<H>  3.9   |  27  |  0.55    Ca    8.9      19 Nov 2018 05:29  Phos  3.5     11-19  Mg     1.9     11-19    TPro  x   /  Alb  2.4<L>  /  TBili  x   /  DBili  x   /  AST  x   /  ALT  x   /  AlkPhos  x   11-19           Culture - Urine (11.13.18 @ 19:35)    Specimen Source: .Urine Clean Catch (Midstream)    Culture Results:   No growth    Culture - Blood (11.13.18 @ 16:00)    Specimen Source: .Blood None    Culture Results:   No growth to date.          Radiology: all available radiological tests reviewed  < from: CT Lower Extremity w/ IV Cont, Right (11.13.18 @ 18:20) >    EXAM:  CT LWR EXT IC RT                            PROCEDURE DATE:  11/13/2018          INTERPRETATION:  [facility] RADIOLOGIST PRELIMINARY REPORT    EXAM:    CT Right Lower Extremity With IV Contrast, Tibia and Fibula     EXAM DATE/TIME:    11/13/2018 6:31 PM     CLINICAL HISTORY:    68 years old, female; Pain; Lower leg; Right     TECHNIQUE:    CT of the Right lower extremity with intravenous contrast was performed.   Exam   focused on the tibia and fibula.    Coronal and sagittal reformatted images were created and reviewed.     COMPARISON:    CR XR LOWER EXTREMITY RIGHT 11/13/2018 5:45 PM     FINDINGS:    Bones/joints:  Severe tricompartment degenerative changes of the knee.   Small   partially visualized the patella joint effusion with calcifications.   Degenerative changes in the foot. Old nonunited fractures in the medial   malleolus. No acute bony abnormality identified. No obvious bony   destructive   changes. Calcaneal spurs.    Soft tissues:  Extensive diffuse soft tissue edema andskin thickening.   No   loculated fluid collection. Possible skin ulceration in the posterior   calf.    Vasculature:  Arteriosclerosis.     IMPRESSION:   1. Extensive diffuse soft tissue edema and skin thickening. No loculated   fluid   collection. Possible skin ulceration in the posterior calf.   2. No acute bony abnormality or obvious bony destructive changes.   3. Degenerative changes of the bones.   4. Arteriosclerosis.   5. Other findings as described above.    CT LWR EXT IC RT dated 11/13/2018 6:20 PM     INDICATION: Right lower leg pain and swelling with wound.    COMPARISON: Ankle radiographs dated 11/13/2018    TECHNIQUE: CT imaging of the right lower extremity was performed with   contrast. The data was reformatted in the axial, coronal, and sagittal   planes.   Contrast: Omnipaque 350. Administered: 150 cc per discarded: 50 cc.    FINDINGS:    OSSEOUS STRUCTURES: There is no cortical erosion or destruction to   suggest osteomyelitis. No periosteal reaction is seen. There is severe   osteoarthritis of the knee with marked joint space narrowing the   patellofemoral compartment. Severe narrowing of the medial knee joint   space. Subchondral sclerosis and cystic changes are noted. There are   degenerative changes at the midfoot joint with moderate to severe   narrowing at the second through fourth TMT's. There are subchondral   cystic changes at the second TMT.  SYNOVIUM/ JOINT FLUID: There is partially imaged joint effusion at the   knee.  TENDONS: The tendons are intact.No full-thickness tendon tear or   retraction is noted.  MUSCLES: There is no intramuscular hematoma. There is a small   intramuscular lipoma involving the medial gastrocnemius muscle inferiorly   measuring up to 1.6 cm.  NEUROVASCULAR STRUCTURES: There is moderate to severe vascular   calcification present.  SUBCUTANEOUS SOFT TISSUES: There is circumferential soft tissue swelling   about the calf. There is mild associated skin thickening. There is a   small suspected skin defect at the posterioraspect of the lower calf   measuring up to 1.4 cm. Skin irregularity is noted along the dorsum of   the midfoot and forefoot with overlying bandage. A focal confluent area   of hypodensity is appreciated overlying the base of the first metatarsal   measuring approximately 1.0 x 1.0 x 3.1 cm (series 2, image 214).      IMPRESSION:    1.  Circumferential soft tissue swelling about the calf with extension   into the foot. Nonspecific but can be seen in the setting of cellulitis.  2.  Focal area of hypodensity in the soft tissues over the base of the   first metatarsal concerning for phlegmonous change versus early abscess   formation.  3.  No CT evidence for osteomyelitis. If concern persists, MRI or bone   scan can be performed.    < end of copied text >    Advanced directives addressed: full resuscitation

## 2018-11-19 NOTE — DISCHARGE NOTE ADULT - HOME CARE AGENCY
[facility] [place] [facility] [ Tele: [phone] ].  Requested resumption of home care starting on 11/20/2018. [facility] [place] [facility] [ Tele: [phone] ].  Requested resumption of home care starting on 11/21/2018.

## 2018-11-19 NOTE — DISCHARGE NOTE ADULT - HOSPITAL COURSE
66 yo F with PMH DM2, CHF, recurrent cellulitis, venous insufficiency HTN, AFIB on coumadin presents to [facility] on 11/13/18 with RLE pain. RLE CT showed soft tissue swelling. Patient was treated with doxycyline 100 mg BID and cefepime 2 g BID. Patient has improving b/l lower extremity swelling and RLE erythema and pain has improved. Patient is stable and medically cleared for discharge from [facility] on 11/19/18. Patient will continue taking doxycyline 100 mg PO BID for 10 days. 68 yo F with PMH DM2, CHF, recurrent cellulitis, venous insufficiency HTN, AFIB on coumadin presents to [facility] on 11/13/18 with RLE pain. RLE CT showed soft tissue swelling. Patient was treated with doxycyline 100 mg BID and cefepime 2 g BID. Pt developed SOB and was started on 80 mg IV Lasix BID, and potassium was repleted PRN. Pt was given metolazone 5 mg PO QD for two days. Patient has improving b/l lower extremity swelling and RLE erythema and pain has improved. Patient is stable and medically cleared for discharge from [facility] on 11/19/18. Patient will continue taking doxycyline 100 mg PO BID for 10 days. Patient to follow up with PMD Dr. Mayer and Vascular Surgeon, Dr Carlson as outpatient. 68 yo F with PMH DM2, CHF, recurrent cellulitis, venous insufficiency HTN, AFIB on coumadin presents to [facility] on 11/13/18 with RLE pain. RLE CT showed soft tissue swelling. Patient was treated with doxycyline 100 mg BID and cefepime 2 g BID. Pt developed SOB and was started on 80 mg IV Lasix BID, and potassium was repleted PRN. Pt was given metolazone 5 mg PO QD for two days. Patient has improving b/l lower extremity swelling and RLE erythema and pain has improved. Patient is stable and medically cleared for discharge from [facility] on 11/19/18. Patient will continue taking doxycyline 100 mg PO BID for total 10 day course of antibotics. Patient to follow up with PMD Dr. Mayer and Vascular Surgeon, Dr Carlson as outpatient.     Vital Signs Last 24 Hrs  T(C): 37.1 (20 Nov 2018 11:13), Max: 37.1 (20 Nov 2018 11:13)  T(F): 98.7 (20 Nov 2018 11:13), Max: 98.7 (20 Nov 2018 11:13)  HR: 75 (20 Nov 2018 11:13) (75 - 91)  BP: 133/75 (20 Nov 2018 11:13) (116/89 - 141/84)  BP(mean): --  RR: 17 (20 Nov 2018 11:13) (17 - 19)  SpO2: 97% (20 Nov 2018 11:13) (96% - 97%)                          13.0   12.30 )-----------( 466      ( 20 Nov 2018 05:02 )             39.9   20 Nov 2018 05:02    137    |  97     |  22     ----------------------------<  161    3.2     |  30     |  0.54     Ca    8.9        20 Nov 2018 05:02  Phos  3.7       20 Nov 2018 05:02  Mg     1.9       20 Nov 2018 05:02    PT/INR - ( 20 Nov 2018 05:02 )   PT: 24.8 sec;   INR: 2.18 ratio         TPro  x      /  Alb  2.6    /  TBili  x      /  DBili  x      /  AST  x      /  ALT  x      /  AlkPhos  x      20 Nov 2018 05:02 66 yo F with PMH DM2, CHF, recurrent cellulitis, venous insufficiency HTN, AFIB on coumadin presents to [facility] on 11/13/18 with RLE pain. RLE CT showed soft tissue swelling. Patient was treated with doxycyline 100 mg BID and cefepime 2 g BID. Pt developed SOB and was started on 80 mg IV Lasix BID, and potassium was repleted PRN. Pt was given metolazone 5 mg PO QD for two days. Patient has improving b/l lower extremity swelling and RLE erythema and pain has improved. Patient is stable and medically cleared for discharge from [facility] on 11/20/18. Patient will continue taking doxycyline 100 mg PO BID for total 10 day course of antibiotics . Patient to follow up with PMD Dr. Mayer and Vascular Surgeon, Dr Carlson as outpatient. Primary care Dr Mayer is aware and was following patient in the hospital .    Vital Signs Last 24 Hrs  T(C): 37.1 (20 Nov 2018 11:13), Max: 37.1 (20 Nov 2018 11:13)  T(F): 98.7 (20 Nov 2018 11:13), Max: 98.7 (20 Nov 2018 11:13)  HR: 75 (20 Nov 2018 11:13) (75 - 91)  BP: 133/75 (20 Nov 2018 11:13) (116/89 - 141/84)  BP(mean): --  RR: 17 (20 Nov 2018 11:13) (17 - 19)  SpO2: 97% (20 Nov 2018 11:13) (96% - 97%)                          13.0   12.30 )-----------( 466      ( 20 Nov 2018 05:02 )             39.9   20 Nov 2018 05:02    137    |  97     |  22     ----------------------------<  161    3.2     |  30     |  0.54     Ca    8.9        20 Nov 2018 05:02  Phos  3.7       20 Nov 2018 05:02  Mg     1.9       20 Nov 2018 05:02    PT/INR - ( 20 Nov 2018 05:02 )   PT: 24.8 sec;   INR: 2.18 ratio         TPro  x      /  Alb  2.6    /  TBili  x      /  DBili  x      /  AST  x      /  ALT  x      /  AlkPhos  x      20 Nov 2018 05:02

## 2018-11-19 NOTE — DISCHARGE NOTE ADULT - MEDICATION SUMMARY - MEDICATIONS TO TAKE
I will START or STAY ON the medications listed below when I get home from the hospital:    freetext medication  -  -- 9 milligram(s) by mouth once a day  -- Indication: For Home medication    Uceris 9 mg oral tablet, extended release  -- 1 tab(s) by mouth once a day (in the morning)  -- Indication: For Home medication    aspirin 81 mg oral tablet  -- 1 tab(s) by mouth once a day  -- Indication: For Cardioprotection    oxyCODONE 5 mg oral capsule  -- 1 cap(s) by mouth 2 times a day MDD:as needed  4tablests max per day  -- Caution federal law prohibits the transfer of this drug to any person other  than the person for whom it was prescribed.  It is very important that you take or use this exactly as directed.  Do not skip doses or discontinue unless directed by your doctor.  May cause drowsiness.  Alcohol may intensify this effect.  Use care when operating dangerous machinery.  This prescription cannot be refilled.  Using more of this medication than prescribed may cause serious breathing problems.    -- Indication: For Pain    dilTIAZem 120 mg oral tablet  -- 1 tab(s) by mouth 2 times a day  -- Indication: For Atrial fibrillation    warfarin 6 mg oral tablet  -- 1 tab(s) by mouth once a day  -- Indication: For Atrial fibrillation    gabapentin 300 mg oral capsule  -- 1 cap(s) by mouth once a day (at bedtime)  -- Indication: For Neuropathy    metFORMIN 1000 mg oral tablet  -- 1 tab(s) by mouth 2 times a day  -- Indication: For Dm    pravastatin 10 mg oral tablet  -- 1 tab(s) by mouth once a day  -- Indication: For Hld    doxycycline monohydrate 100 mg oral capsule  -- 1 cap(s) by mouth every 12 hours  -- Indication: For Cellulitis of right leg    zolpidem 10 mg oral tablet  -- 1 tab(s) by mouth once a day (at bedtime), As Needed  -- Indication: For insomia     Metoprolol Succinate  mg oral tablet, extended release  -- 1 tab(s) by mouth once a day  -- Indication: For Atrial fibrillation    collagenase 250 units/g topical ointment  -- 1 application on skin once a day  -- Indication: For wound care    silver sulfADIAZINE 1% topical cream  -- 1 application on skin once a day  -- Indication: For on wound     Lasix 80 mg oral tablet  -- 1 tab(s) by mouth every 12 hours  -- Indication: For Heart failure and leg swelling    dicyclomine 20 mg oral tablet  -- 1 tab(s) by mouth once a day, As Needed  -- Indication: For Abdominal pain     ferrous sulfate 250 mg oral capsule, extended release  -- 1 cap(s) by mouth once a day  -- Indication: For iron defiency anemia    bisacodyl 5 mg oral delayed release tablet  -- 1 tab(s) by mouth once a day (at bedtime), As Needed  -- Indication: For gerd    docusate sodium 100 mg oral capsule  -- 1 cap(s) by mouth once a day, As Needed  -- Indication: For Constipation    montelukast 10 mg oral tablet  -- 1 tab(s) by mouth once a day (at bedtime)  -- Indication: For Allergies    potassium chloride 20 mEq oral tablet, extended release  -- 1 tab(s) by mouth 2 times a day  -- Indication: For on lasix    ascorbic acid 500 mg oral tablet  -- 1 tab(s) by mouth once a day  -- Indication: For iron defiency anemai

## 2018-11-19 NOTE — PROGRESS NOTE ADULT - SUBJECTIVE AND OBJECTIVE BOX
No new complaints today  Remains afebrile    Vital Signs Last 24 Hrs  T(C): 36.7 (19 Nov 2018 08:12), Max: 36.9 (18 Nov 2018 11:19)  T(F): 98 (19 Nov 2018 08:12), Max: 98.4 (18 Nov 2018 11:19)  HR: 88 (19 Nov 2018 08:12) (82 - 95)  BP: 120/78 (19 Nov 2018 08:12) (117/50 - 148/66)  BP(mean): --  RR: 20 (19 Nov 2018 08:12) (18 - 20)  SpO2: 96% (19 Nov 2018 08:12) (95% - 98%)    Exam  AAO x 3, NAD  Non labored breathing  Abd soft ND, NT  Extremities: WWP, edema with purplish discoloration, wounds clean and dry                          12.8   10.37 )-----------( 465      ( 19 Nov 2018 05:29 )             39.8   11-19    138  |  101  |  19  ----------------------------<  156<H>  3.9   |  27  |  0.55    Ca    8.9      19 Nov 2018 05:29  Phos  3.5     11-19  Mg     1.9     11-19    TPro  x   /  Alb  2.4<L>  /  TBili  x   /  DBili  x   /  AST  x   /  ALT  x   /  AlkPhos  x   11-19

## 2018-11-19 NOTE — DISCHARGE NOTE ADULT - OTHER SIGNIFICANT FINDINGS
EXAM:  CT LWR EXT IC RT                            PROCEDURE DATE:  11/13/2018          INTERPRETATION:  [facility] RADIOLOGIST PRELIMINARY REPORT    EXAM:    CT Right Lower Extremity With IV Contrast, Tibia and Fibula     EXAM DATE/TIME:    11/13/2018 6:31 PM     IMPRESSION:    1.  Circumferential soft tissue swelling about the calf with extension   into the foot. Nonspecific but can be seen in the setting of cellulitis.  2.  Focal area of hypodensity in the soft tissues over the base of the   first metatarsal concerning for phlegmonous change versus early abscess   formation.  3.  No CT evidence for osteomyelitis. If concern persists, MRI or bone   scan can be performed.

## 2018-11-19 NOTE — DISCHARGE NOTE ADULT - MEDICATION SUMMARY - MEDICATIONS TO CHANGE
I will SWITCH the dose or number of times a day I take the medications listed below when I get home from the hospital:    warfarin 5 mg oral tablet  -- 1 tab(s) by mouth once a day    furosemide 40 mg oral tablet  -- 1 tab(s) by mouth 2 times a day

## 2018-11-19 NOTE — PROGRESS NOTE ADULT - ATTENDING COMMENTS
Pt seen and examined with family medicine residents. Agree with assessment and plan as outlined above, changes made where appropriate. Plan discussed with patient in detail.     Pt with RLE cellulitis on IV abx (ID and vascular following) continue diuresis with IV lasix for acute on chronic diastolic heart failure. Monitor HR and titrate BB if tachycardic.
on exam- comfortable   -rs-aeeb, cta    #d/c today with further management as an outpt     #time spent 55 minutes
on exam- comfortable   RS- aeeb, cta     #ct abx     # Discussed with Dr. Jimenez
Pt is seen and examined with family practice residents on rounds. Agree with above assessment and plan. D/w pt at bedside
Patient seen and examined with Family Medicine Residents Dr. Leeanne Morrow, Dora Willoughby and Fany Nesbitt on the Family Medicine Teaching Service.  Agree with history, physical, labs and plan which were reviewed in detail after a face to face encounter with the patient.

## 2018-11-19 NOTE — PROGRESS NOTE ADULT - ASSESSMENT
68y F PMHx of DM, CHF, Venous Insufficiency, Ulcerative Colitis, HTN, HLD admitted 11/13/18, with fevers starting the night before admission and RLE pain. Patient with chronic ulcer of the posterior calf of the RLE, has been causing recurrent cellulitis with multiple hospital admissions since 8/18, patient admits that in the past couple of days her leg has been having increased swelling, erythema and pain. Pain is non-radiating constant severe burning pain with no alleviation to NSAIDs and Percocet. Patient admits that the pain is associated with chills, nausea with heaving no vomiting, as well as shortness of breath and palpitations, found to be in Afib RVR in the ED with elevated LA/wbc ct 23, was given IV vancomycin/cefepime for cellulitis.     1. sepsis/chronic b/l LE stasis dermatitis/superimposed RLE cellulitis/PVD/obesity  - slowly improving  - completed 5 days of IV doxy/cefepime   - continue with abx coverage  - cx no growth  - wound care/vascular eval appreciated  - switch to po oral doxy to complete 7-10 day course  - monitor temps  - tolerating abx well so far; no side effects noted  - reason for abx use and side effects reviewed with patient  - supportive care  - f/u cbc    2. other issues - care per medicine

## 2018-11-19 NOTE — DISCHARGE NOTE ADULT - CARE PLAN
Principal Discharge DX:	Cellulitis of right leg  Goal:	Return to baseline with improvement of pain and swelling  Secondary Diagnosis:	Acute on chronic diastolic congestive heart failure  Secondary Diagnosis:	Atrial fibrillation  Secondary Diagnosis:	Controlled type 2 diabetes mellitus without complication, unspecified whether long term insulin use  Secondary Diagnosis:	HTN (hypertension)  Secondary Diagnosis:	Hyperlipidemia associated with type 2 diabetes mellitus  Secondary Diagnosis:	Neuropathy Principal Discharge DX:	Cellulitis of right leg  Goal:	Return to baseline with improvement of pain and swelling  Assessment and plan of treatment:	Patient to follow up with PMD Dr. Mayer and Vascular Surgeon, Dr Carlson as outpatient.  Continue doxycyline 100 mg PO 2x/day for 10 days  Secondary Diagnosis:	Acute on chronic diastolic congestive heart failure  Assessment and plan of treatment:	-Continue Lasix 80 mg PO 2x/day  -Monitor for signs of lightheadedness and dizziness while on lasix  -Patient to follow up with PMD Dr. Mayer  Secondary Diagnosis:	Atrial fibrillation  Goal:	Monitor INR closely  Assessment and plan of treatment:	-INR was 1.81 today  -Will send patient home on 7 mg coumadin since INR < 2.  -Follow up with Dr. Mayer closely for INR monitoring  Secondary Diagnosis:	Controlled type 2 diabetes mellitus without complication, unspecified whether long term insulin use  Assessment and plan of treatment:	-Continue home medications   -Follow up with Dr. Mayer as outpatient  Secondary Diagnosis:	HTN (hypertension)  Assessment and plan of treatment:	-Continue home medications   -Follow up with Dr. Mayer as outpatient  Secondary Diagnosis:	Hyperlipidemia associated with type 2 diabetes mellitus  Assessment and plan of treatment:	-Continue home medications   -Follow up with Dr. Mayer as outpatient  Secondary Diagnosis:	Neuropathy  Assessment and plan of treatment:	-Continue home medications   -Follow up with Dr. Mayer as outpatient Principal Discharge DX:	Cellulitis of right leg  Goal:	Return to baseline with improvement of pain and swelling  Assessment and plan of treatment:	Patient to follow up with PMD Dr. Mayer and Vascular Surgeon, Dr Carlson as outpatient.  Continue doxycyline 100 mg PO 2x/day for total 10 days needs by 4 more days till 11/23  Secondary Diagnosis:	Acute on chronic diastolic congestive heart failure  Goal:	no shortness of breath ,worsening leg swelling .  Assessment and plan of treatment:	-Continue Lasix 80 mg PO 2x/day  -Monitor for signs of lightheadedness and dizziness while on lasix  -Patient to follow up with PMD Dr. Mayer  -Wound care with silvadene, collagenase and compression  Secondary Diagnosis:	Atrial fibrillation  Goal:	Monitor INR closely  Assessment and plan of treatment:	-PT/INR - ( 20 Nov 2018 05:02 )   PT: 24.8 sec;   INR: 2.18 ratio    -Will send patient home on 6 mg coumadin.  -Follow up with Dr. Mayre closely for INR monitoring  Secondary Diagnosis:	Controlled type 2 diabetes mellitus without complication, unspecified whether long term insulin use  Goal:	no symptoms of hyperglycemia and hyperglycemia  Assessment and plan of treatment:	-Continue home medications   -Follow up with Dr. Mayer as outpatient  Secondary Diagnosis:	HTN (hypertension)  Goal:	goal stable BP <140/80  Assessment and plan of treatment:	-Continue home medications   -Follow up with Dr. Mayer as outpatient  -Low salt and cholesterol diet  Secondary Diagnosis:	Hyperlipidemia associated with type 2 diabetes mellitus  Goal:	low cholesterol diet  Assessment and plan of treatment:	-Continue home medications   -Follow up with Dr. Mayer as outpatient  Secondary Diagnosis:	Neuropathy  Goal:	prevent further worsening of neuropathy  Assessment and plan of treatment:	-Continue home medications   -Follow up with Dr. Mayer as outpatient

## 2018-11-19 NOTE — DISCHARGE NOTE ADULT - PATIENT PORTAL LINK FT
You can access the [facility] Patient Portal, offered by [facility], by registering with the following website: [email or web address]

## 2018-11-19 NOTE — PROGRESS NOTE ADULT - ASSESSMENT
68y F PMHx of DM, CHF, Venous Insufficiency, Ulcerative Colitis, HTN, HLD, recurrent cellulitis presents to [facility] on 11/13/18, with fevers starting the night before admission and RLE pain 2/2 cellulitis.    #RLE Cellulitis   ~in setting of resolving infected R foot hematoma  -monitor temp and WBC  -Continue cefepime 2 g BID and doxy 100mg BID for mrsa coverage  -D/C on PO doxycycline   -Blood cx- NGTD, Ucx- NGTD  -ID recs appreciated  -Vascular recs appreciated: -Bilateral LE compression dressing with collagenase and Ace bandage, Pain control, Leg elevation, silver sulfadiazine cream  -Pain meds- morphine 1 mg IV q4h PRN moderate pain, tylenol q6h (Continue Dulcolax and colace)  -Arterial duplex - no stenosis      #CHF ~ pEF   -11/15 - SOB and wheezing overnight -Lasix and duoneb given  -Continue Lasix 80 mg IV BID   -Metolazone 5 mg PO QD - Monitor BUN/Cr closely -- will not continue on discharge   -duoneb q6h PRN   - echo- EF 55-60% w/ mild aortic stenosis, mild concentric LV hypertrophy  -continue metoprolol- will increased to BID if tachycardiac occurs      #Afib  -CHADSVASc= 5   -continue diltiazem 120 mg BID and coumadin and ASA  -INR =1.81  -Continue Warfarin 6 mg    #DM2  -ISS  -Gabapentin 300 mg TID       #HTN  -Metoprolol 100 mg QD   -Cardio recs appreciated- can increase metoprolol to Q12, if HR increases      #HLD  -Continue atorvastatin    #Anemia  -resolved  -continue ferrous sulfate       #UC  -Continue Uceris   -Continue bentyl   -Continue Colace and dulcolax     #DISPO  - home w/ home PT- face to face completed

## 2018-11-19 NOTE — PROGRESS NOTE ADULT - ASSESSMENT
Assessment and Plan: 68year old female with resolving sepsis and cellulitis. Now doing better, pain better controlled.   -Continue abx per id  -Wound care with silvadene, collagenase and compression  -Ok to d/c home from vascular perspective  -Follow up with Dr Carlson as outpatient

## 2018-11-19 NOTE — DISCHARGE NOTE ADULT - PLAN OF CARE
Return to baseline with improvement of pain and swelling Patient to follow up with PMD Dr. Mayer and Vascular Surgeon, Dr Carlson as outpatient.  Continue doxycyline 100 mg PO 2x/day for 10 days -Continue Lasix 80 mg PO 2x/day  -Monitor for signs of lightheadedness and dizziness while on lasix  -Patient to follow up with PMD Dr. Mayer Monitor INR closely -INR was 1.81 today  -Will send patient home on 7 mg coumadin since INR < 2.  -Follow up with Dr. Mayer closely for INR monitoring -Continue home medications   -Follow up with Dr. Mayer as outpatient Patient to follow up with PMD Dr. Mayer and Vascular Surgeon, Dr Carlson as outpatient.  Continue doxycyline 100 mg PO 2x/day for total 10 days needs by 4 more days till 11/23 no shortness of breath ,worsening leg swelling . -Continue Lasix 80 mg PO 2x/day  -Monitor for signs of lightheadedness and dizziness while on lasix  -Patient to follow up with PMD Dr. Mayer  -Wound care with silvadene, collagenase and compression -PT/INR - ( 20 Nov 2018 05:02 )   PT: 24.8 sec;   INR: 2.18 ratio    -Will send patient home on 6 mg coumadin.  -Follow up with Dr. Mayer closely for INR monitoring no symptoms of hyperglycemia and hyperglycemia goal stable BP <140/80 -Continue home medications   -Follow up with Dr. Mayer as outpatient  -Low salt and cholesterol diet low cholesterol diet prevent further worsening of neuropathy

## 2018-11-19 NOTE — PROGRESS NOTE ADULT - SUBJECTIVE AND OBJECTIVE BOX
CHIEF COMPLAINT:    SUBJECTIVE:   HPI  11/13/18  Patient is 68y F PMHx of DM, CHF, Venous Insufficiency, Ulcerative Colitis, HTN, HLD, presents to [facility] on 11/13/18, with fevers starting the night before admission and RLE pain. Patient with chronic ulcer of the posterior calf of the RLE, has been causing recurrent cellulitis with multiple hospital admissions since 8/18, patient admits that in the past couple of days her leg has been having increased swelling, erythema and pain. Pain is non-radiating constant severe burning pain with no alleviation to NSAIDs and Percocet. Patient admits that the pain is associated with chills, nausea with heaving no vomiting, as well as shortness of breath and palpitations, found to be in Afib RVR in the ED. Patient also admits some diarrhea but is associated with her UC.      ---  SUBJECTIVE   11/14/18  Patient was seen and evaluated at bedside. States RLE pain has been 10/10 with burning sensation and chills also present. No other complaints at this time. Potassium was repleted this AM.     -----  11/15/18  Overnight, patient desaturated to low 90s and was wheezing. Patient was given Lasix STAT and duoneb treatment. Lasix was subsequently ordered for 80 mg IV BID.  Patient seen today, breathing and wheezing improved and still complaining of RLE pain , with burning sensation.   K repleted in the AM.    -----  11/16/18    Overnight, patient on tele in afib with HR range 70-130s and some PVCs. Vitals otherwise stable. No wheezing or SOB. Patient reports leg pain/ burning is still severe. Discussed increasing gabapentin to 300 mg TID. She also c/o tremors that have recently started. Told patient that can be side effect of duoneb and changed order to PRN duoneb. INR today <2 and warfarin ordered for 6 mg.  Patient states she takes potassium supplements at home, and ordered was placed today.    ----  11/18/18  Pt seen and evaluated today. States pain is still present in RLE similar to days before, burning sensation still present.    -----  11/19/18  Overnight pt in Afib with rate of 80-96.   Pt seen today. Pt reports dizziness when standing up and that RLE is still painful. Discussed plan for possible discharge today, if dizziness resolves and blood pressure is within normal range.     REVIEW OF SYSTEMS:    CONSTITUTIONAL: No weakness, no fevers , no chills, + dizziness  EYES/ENT: No visual changes;  No vertigo or throat pain   NECK: No pain or stiffness  RESPIRATORY: No cough, wheezing, hemoptysis; No shortness of breath  CARDIOVASCULAR: No chest pain or palpitations  GASTROINTESTINAL: No abdominal or epigastric pain. No nausea, vomiting, or hematemesis; No diarrhea or constipation. No melena or hematochezia.  GENITOURINARY: No dysuria, frequency or hematuria  NEUROLOGICAL: No numbness or weakness  SKIN: No itching, burning, rashes, or lesions   EXTREMITIES: RLE pain and burning sensation   All other review of systems is negative unless indicated above    Vital Signs Last 24 Hrs  T(C): 36.7 (19 Nov 2018 12:02), Max: 36.9 (19 Nov 2018 05:08)  T(F): 98 (19 Nov 2018 12:02), Max: 98.4 (19 Nov 2018 05:08)  HR: 93 (19 Nov 2018 12:02) (82 - 95)  BP: 124/74 (19 Nov 2018 12:02) (117/50 - 148/66)  BP(mean): --  RR: 18 (19 Nov 2018 12:02) (18 - 20)  SpO2: 97% (19 Nov 2018 12:02) (95% - 98%)    I&O's Summary    CAPILLARY BLOOD GLUCOSE    CAPILLARY BLOOD GLUCOSE      CAPILLARY BLOOD GLUCOSE      POCT Blood Glucose.: 176 mg/dL (19 Nov 2018 11:53)  POCT Blood Glucose.: 180 mg/dL (19 Nov 2018 08:09)  POCT Blood Glucose.: 226 mg/dL (18 Nov 2018 21:20)  POCT Blood Glucose.: 172 mg/dL (18 Nov 2018 18:16)      PHYSICAL EXAM:    Constitutional: NAD, awake and alert, obese  Respiratory: Breath sounds are clear bilaterally, No wheezing, rales or rhonchi  Cardiovascular: S1 and S2, regular rate and rhythm, no Murmurs, gallops or rubs  Gastrointestinal: Bowel Sounds present, soft, nontender, nondistended, no guarding, no rebound  Extremities: LLE peripheral edema noted, with erythematous changes consistent with venous insufficiency. RLE tender to palpation.  B/L lower extremities less edematous.   Vascular: 1+ peripheral pulses      MEDICATIONS  (STANDING):  ascorbic acid 500 milliGRAM(s) Oral daily  aspirin  chewable 81 milliGRAM(s) Oral daily  atorvastatin 10 milliGRAM(s) Oral at bedtime  bisacodyl 5 milliGRAM(s) Oral at bedtime  collagenase Ointment 1 Application(s) Topical daily  dextrose 5%. 1000 milliLiter(s) (50 mL/Hr) IV Continuous <Continuous>  dextrose 50% Injectable 12.5 Gram(s) IV Push once  dextrose 50% Injectable 25 Gram(s) IV Push once  dextrose 50% Injectable 25 Gram(s) IV Push once  diltiazem    Tablet 120 milliGRAM(s) Oral two times a day  docusate sodium 100 milliGRAM(s) Oral daily  doxycycline hyclate Capsule 100 milliGRAM(s) Oral every 12 hours  ferrous    sulfate 325 milliGRAM(s) Oral daily  furosemide   Injectable 80 milliGRAM(s) IV Push every 12 hours  gabapentin 300 milliGRAM(s) Oral three times a day  heparin  Injectable 5000 Unit(s) SubCutaneous every 12 hours  insulin lispro (HumaLOG) corrective regimen sliding scale   SubCutaneous three times a day before meals  insulin lispro (HumaLOG) corrective regimen sliding scale   SubCutaneous at bedtime  magnesium oxide 400 milliGRAM(s) Oral two times a day with meals  metolazone 5 milliGRAM(s) Oral daily  metoprolol succinate  milliGRAM(s) Oral daily  montelukast 10 milliGRAM(s) Oral at bedtime  potassium chloride    Tablet ER 40 milliEquivalent(s) Oral two times a day  silver sulfADIAZINE 1% Cream 1 Application(s) Topical daily  Uceris (Budesonide EC) 9 milliGRAM(s) 9 milliGRAM(s) Oral daily  warfarin 6 milliGRAM(s) Oral daily    MEDICATIONS  (PRN):  acetaminophen   Tablet .. 650 milliGRAM(s) Oral every 6 hours PRN Temp greater or equal to 38C (100.4F)  ALBUTerol/ipratropium for Nebulization 3 milliLiter(s) Nebulizer every 6 hours PRN Shortness of Breath and/or Wheezing  dextrose 40% Gel 15 Gram(s) Oral once PRN Blood Glucose LESS THAN 70 milliGRAM(s)/deciliter  dicyclomine 20 milliGRAM(s) Oral daily PRN abdominal pain  glucagon  Injectable 1 milliGRAM(s) IntraMuscular once PRN Glucose LESS THAN 70 milligrams/deciliter  morphine  - Injectable 1 milliGRAM(s) IV Push every 4 hours PRN Moderate Pain (4 - 6)  zolpidem 5 milliGRAM(s) Oral at bedtime PRN Insomnia                          12.8   10.37 )-----------( 465      ( 19 Nov 2018 05:29 )             39.8       LABS: All Labs Reviewed:                        11-19    138  |  101  |  19  ----------------------------<  156<H>  3.9   |  27  |  0.55    Ca    8.9      19 Nov 2018 05:29  Phos  3.5     11-19  Mg     1.9     11-19    TPro  x   /  Alb  2.4<L>  /  TBili  x   /  DBili  x   /  AST  x   /  ALT  x   /  AlkPhos  x   11-19       PTT - ( 13 Nov 2018 16:00 )  PTT:39.1 sec  CARDIAC MARKERS ( 14 Nov 2018 05:25 )  x     / x     / 41 U/L / x     / x      CARDIAC MARKERS ( 13 Nov 2018 20:23 )  <0.015 ng/mL / x     / x     / x     / x      CARDIAC MARKERS ( 13 Nov 2018 16:33 )  <0.015 ng/mL / x     / 44 U/L / x     / x      CARDIAC MARKERS ( 13 Nov 2018 16:00 )  <0.015 ng/mL / x     / x     / x     / x          Blood Culture:     RADIOLOGY/EKG:    CT LOWER EXTREMITY W/ IV CONT      IMPRESSION:    1.  Circumferential soft tissue swelling about the calf with extension   into the foot. Nonspecific but can be seen in the setting of cellulitis.  2.  Focal area of hypodensity in the soft tissues over the base of the   first metatarsal concerning for phlegmonous change versus early abscess   formation.  3.  No CT evidence for osteomyelitis. If concern persists, MRI or bone   scan can be performed.    ---  TTE 11/15/18   Impression     Summary     Mild to moderate mitral regurgitation is present.   Significant fibrocalcific changes noted to the aortic valve leaflets with   restriction in leaflet excursion. Peak transaortic gradient is 27 mmHg;   this finding is consistent with mild aortic stenosis.   Trace aortic regurgitation is present.   Moderate (2+) tricuspid valve regurgitation is present.   Normal appearing pulmonic valve structure and function.   The left atrium is moderately dilated.   The left ventricle is normal in size, wall motion and contractility.   Mild concentric left ventricular hypertrophy is present.   Estimated left ventricular ejection fraction is 55-60 %.   The right atrium appears moderately dilated.   Normal appearing right ventricle structure and function.   All visualized extra cardiac structures appears to be normal.   No evidence of pericardial effusion.    DVT PPX: Hep SQ BID    ADVANCED DIRECTIVE:    DISPOSITION: home w/ home PT- FACE to face completed

## 2018-11-20 VITALS
SYSTOLIC BLOOD PRESSURE: 133 MMHG | TEMPERATURE: 99 F | HEART RATE: 75 BPM | OXYGEN SATURATION: 97 % | DIASTOLIC BLOOD PRESSURE: 75 MMHG | RESPIRATION RATE: 17 BRPM

## 2018-11-20 LAB
ALBUMIN SERPL ELPH-MCNC: 2.6 G/DL — LOW (ref 3.3–5)
ANION GAP SERPL CALC-SCNC: 10 MMOL/L — SIGNIFICANT CHANGE UP (ref 5–17)
BUN SERPL-MCNC: 22 MG/DL — SIGNIFICANT CHANGE UP (ref 7–23)
CALCIUM SERPL-MCNC: 8.9 MG/DL — SIGNIFICANT CHANGE UP (ref 8.5–10.1)
CHLORIDE SERPL-SCNC: 97 MMOL/L — SIGNIFICANT CHANGE UP (ref 96–108)
CO2 SERPL-SCNC: 30 MMOL/L — SIGNIFICANT CHANGE UP (ref 22–31)
CREAT SERPL-MCNC: 0.54 MG/DL — SIGNIFICANT CHANGE UP (ref 0.5–1.3)
GLUCOSE BLDC GLUCOMTR-MCNC: 152 MG/DL — HIGH (ref 70–99)
GLUCOSE BLDC GLUCOMTR-MCNC: 253 MG/DL — HIGH (ref 70–99)
GLUCOSE SERPL-MCNC: 161 MG/DL — HIGH (ref 70–99)
HCT VFR BLD CALC: 39.9 % — SIGNIFICANT CHANGE UP (ref 34.5–45)
HGB BLD-MCNC: 13 G/DL — SIGNIFICANT CHANGE UP (ref 11.5–15.5)
INR BLD: 2.18 RATIO — HIGH (ref 0.88–1.16)
MAGNESIUM SERPL-MCNC: 1.9 MG/DL — SIGNIFICANT CHANGE UP (ref 1.6–2.6)
MCHC RBC-ENTMCNC: 30.5 PG — SIGNIFICANT CHANGE UP (ref 27–34)
MCHC RBC-ENTMCNC: 32.6 GM/DL — SIGNIFICANT CHANGE UP (ref 32–36)
MCV RBC AUTO: 93.7 FL — SIGNIFICANT CHANGE UP (ref 80–100)
NRBC # BLD: 0 /100 WBCS — SIGNIFICANT CHANGE UP (ref 0–0)
PHOSPHATE SERPL-MCNC: 3.7 MG/DL — SIGNIFICANT CHANGE UP (ref 2.5–4.5)
PLATELET # BLD AUTO: 466 K/UL — HIGH (ref 150–400)
POTASSIUM SERPL-MCNC: 3.2 MMOL/L — LOW (ref 3.5–5.3)
POTASSIUM SERPL-SCNC: 3.2 MMOL/L — LOW (ref 3.5–5.3)
PROTHROM AB SERPL-ACNC: 24.8 SEC — HIGH (ref 10–12.9)
RBC # BLD: 4.26 M/UL — SIGNIFICANT CHANGE UP (ref 3.8–5.2)
RBC # FLD: 13.4 % — SIGNIFICANT CHANGE UP (ref 10.3–14.5)
SODIUM SERPL-SCNC: 137 MMOL/L — SIGNIFICANT CHANGE UP (ref 135–145)
WBC # BLD: 12.3 K/UL — HIGH (ref 3.8–10.5)
WBC # FLD AUTO: 12.3 K/UL — HIGH (ref 3.8–10.5)

## 2018-11-20 RX ORDER — FUROSEMIDE 40 MG
1 TABLET ORAL
Qty: 0 | Refills: 0 | DISCHARGE
Start: 2018-11-20

## 2018-11-20 RX ORDER — OXYCODONE HYDROCHLORIDE 5 MG/1
1 TABLET ORAL
Qty: 10 | Refills: 0
Start: 2018-11-20 | End: 2018-11-24

## 2018-11-20 RX ORDER — FUROSEMIDE 40 MG
1 TABLET ORAL
Qty: 30 | Refills: 0
Start: 2018-11-20 | End: 2018-12-04

## 2018-11-20 RX ORDER — POTASSIUM CHLORIDE 20 MEQ
40 PACKET (EA) ORAL ONCE
Qty: 0 | Refills: 0 | Status: COMPLETED | OUTPATIENT
Start: 2018-11-20 | End: 2018-11-20

## 2018-11-20 RX ORDER — WARFARIN SODIUM 2.5 MG/1
1 TABLET ORAL
Qty: 30 | Refills: 0
Start: 2018-11-20 | End: 2018-12-19

## 2018-11-20 RX ORDER — POTASSIUM CHLORIDE 20 MEQ
2 PACKET (EA) ORAL
Qty: 120 | Refills: 0
Start: 2018-11-20 | End: 2018-12-19

## 2018-11-20 RX ORDER — MAGNESIUM OXIDE 400 MG ORAL TABLET 241.3 MG
1 TABLET ORAL
Qty: 15 | Refills: 0
Start: 2018-11-20 | End: 2018-12-04

## 2018-11-20 RX ORDER — FUROSEMIDE 40 MG
80 TABLET ORAL EVERY 12 HOURS
Qty: 0 | Refills: 0 | Status: DISCONTINUED | OUTPATIENT
Start: 2018-11-20 | End: 2018-11-20

## 2018-11-20 RX ADMIN — MAGNESIUM OXIDE 400 MG ORAL TABLET 400 MILLIGRAM(S): 241.3 TABLET ORAL at 10:24

## 2018-11-20 RX ADMIN — ZOLPIDEM TARTRATE 5 MILLIGRAM(S): 10 TABLET ORAL at 01:28

## 2018-11-20 RX ADMIN — Medication 1 APPLICATION(S): at 11:00

## 2018-11-20 RX ADMIN — Medication 40 MILLIEQUIVALENT(S): at 06:16

## 2018-11-20 RX ADMIN — Medication 1: at 08:09

## 2018-11-20 RX ADMIN — Medication 500 MILLIGRAM(S): at 11:04

## 2018-11-20 RX ADMIN — GABAPENTIN 300 MILLIGRAM(S): 400 CAPSULE ORAL at 06:15

## 2018-11-20 RX ADMIN — MORPHINE SULFATE 1 MILLIGRAM(S): 50 CAPSULE, EXTENDED RELEASE ORAL at 08:17

## 2018-11-20 RX ADMIN — Medication 1 APPLICATION(S): at 11:01

## 2018-11-20 RX ADMIN — Medication 100 MILLIGRAM(S): at 11:04

## 2018-11-20 RX ADMIN — Medication 100 MILLIGRAM(S): at 10:25

## 2018-11-20 RX ADMIN — HEPARIN SODIUM 5000 UNIT(S): 5000 INJECTION INTRAVENOUS; SUBCUTANEOUS at 06:15

## 2018-11-20 RX ADMIN — Medication 3: at 11:03

## 2018-11-20 RX ADMIN — Medication 325 MILLIGRAM(S): at 11:04

## 2018-11-20 RX ADMIN — Medication 80 MILLIGRAM(S): at 06:15

## 2018-11-20 RX ADMIN — Medication 81 MILLIGRAM(S): at 11:04

## 2018-11-20 RX ADMIN — Medication 40 MILLIEQUIVALENT(S): at 10:24

## 2018-11-20 RX ADMIN — Medication 100 MILLIGRAM(S): at 06:15

## 2018-11-20 NOTE — PROGRESS NOTE ADULT - REASON FOR ADMISSION
Fevers

## 2018-11-20 NOTE — PROGRESS NOTE ADULT - SUBJECTIVE AND OBJECTIVE BOX
CHIEF COMPLAINT:    SUBJECTIVE:     REVIEW OF SYSTEMS:    CONSTITUTIONAL: No weakness, fevers or chills  EYES/ENT: No visual changes;  No vertigo or throat pain   NECK: No pain or stiffness  RESPIRATORY: No cough, wheezing, hemoptysis; No shortness of breath  CARDIOVASCULAR: No chest pain or palpitations  GASTROINTESTINAL: No abdominal or epigastric pain. No nausea, vomiting, or hematemesis; No diarrhea or constipation. No melena or hematochezia.  GENITOURINARY: No dysuria, frequency or hematuria  NEUROLOGICAL: No numbness or weakness  SKIN: No itching, burning, rashes, or lesions   All other review of systems is negative unless indicated above    Vital Signs Last 24 Hrs  T(C): 37.1 (20 Nov 2018 11:13), Max: 37.1 (20 Nov 2018 11:13)  T(F): 98.7 (20 Nov 2018 11:13), Max: 98.7 (20 Nov 2018 11:13)  HR: 75 (20 Nov 2018 11:13) (75 - 91)  BP: 133/75 (20 Nov 2018 11:13) (116/89 - 141/84)  BP(mean): --  RR: 17 (20 Nov 2018 11:13) (17 - 19)  SpO2: 97% (20 Nov 2018 11:13) (96% - 97%)    I&O's Summary      CAPILLARY BLOOD GLUCOSE      POCT Blood Glucose.: 253 mg/dL (20 Nov 2018 10:50)  POCT Blood Glucose.: 152 mg/dL (20 Nov 2018 07:41)  POCT Blood Glucose.: 207 mg/dL (19 Nov 2018 23:18)  POCT Blood Glucose.: 209 mg/dL (19 Nov 2018 17:09)      PHYSICAL EXAM:    Constitutional: NAD, awake and alert, well-developed  HEENT: PERR, EOMI, Normal Hearing, MMM  Neck: Soft and supple, No LAD, No JVD  Respiratory: Breath sounds are clear bilaterally, No wheezing, rales or rhonchi  Cardiovascular: S1 and S2, regular rate and rhythm, no Murmurs, gallops or rubs  Gastrointestinal: Bowel Sounds present, soft, nontender, nondistended, no guarding, no rebound  Extremities: No peripheral edema  Vascular: 2+ peripheral pulses  Neurological: A/O x 3, no focal deficits  Musculoskeletal: 5/5 strength b/l upper and lower extremities  Skin: No rashes    MEDICATIONS:  MEDICATIONS  (STANDING):  ascorbic acid 500 milliGRAM(s) Oral daily  aspirin  chewable 81 milliGRAM(s) Oral daily  atorvastatin 10 milliGRAM(s) Oral at bedtime  bisacodyl 5 milliGRAM(s) Oral at bedtime  collagenase Ointment 1 Application(s) Topical daily  dextrose 5%. 1000 milliLiter(s) (50 mL/Hr) IV Continuous <Continuous>  dextrose 50% Injectable 12.5 Gram(s) IV Push once  dextrose 50% Injectable 25 Gram(s) IV Push once  dextrose 50% Injectable 25 Gram(s) IV Push once  diltiazem    Tablet 120 milliGRAM(s) Oral two times a day  docusate sodium 100 milliGRAM(s) Oral daily  doxycycline hyclate Capsule 100 milliGRAM(s) Oral every 12 hours  ferrous    sulfate 325 milliGRAM(s) Oral daily  furosemide    Tablet 80 milliGRAM(s) Oral every 12 hours  gabapentin 300 milliGRAM(s) Oral three times a day  insulin lispro (HumaLOG) corrective regimen sliding scale   SubCutaneous three times a day before meals  insulin lispro (HumaLOG) corrective regimen sliding scale   SubCutaneous at bedtime  magnesium oxide 400 milliGRAM(s) Oral two times a day with meals  metoprolol succinate  milliGRAM(s) Oral daily  montelukast 10 milliGRAM(s) Oral at bedtime  potassium chloride    Tablet ER 40 milliEquivalent(s) Oral two times a day  silver sulfADIAZINE 1% Cream 1 Application(s) Topical daily  Uceris (Budesonide EC) 9 milliGRAM(s) 9 milliGRAM(s) Oral daily  warfarin 6 milliGRAM(s) Oral daily      LABS: All Labs Reviewed:                        13.0   12.30 )-----------( 466      ( 20 Nov 2018 05:02 )             39.9     11-20    137  |  97  |  22  ----------------------------<  161<H>  3.2<L>   |  30  |  0.54    Ca    8.9      20 Nov 2018 05:02  Phos  3.7     11-20  Mg     1.9     11-20    TPro  x   /  Alb  2.6<L>  /  TBili  x   /  DBili  x   /  AST  x   /  ALT  x   /  AlkPhos  x   11-20    PT/INR - ( 20 Nov 2018 05:02 )   PT: 24.8 sec;   INR: 2.18 ratio               Blood Culture:     RADIOLOGY/EKG:    DVT PPX:    ADVANCED DIRECTIVE:    DISPOSITION: CHIEF COMPLAINT: Fever     SUBJECTIVE: 11/20 :Seen and examined at bedside .Eager to go home her swelling in the legs .Discharged with PO Lasix 80 PO BID and PO Doxycyline for 4 more days .Called [facility] pharmacy Sonal updated medication added Potassium chloride 40 meq BID and Magnesium oxide .     REVIEW OF SYSTEMS:    CONSTITUTIONAL: No weakness, fevers or chills  EYES/ENT: No visual changes;  No vertigo or throat pain   NECK: No pain or stiffness  RESPIRATORY: No cough, wheezing, hemoptysis; No shortness of breath  CARDIOVASCULAR: No chest pain or palpitations  GASTROINTESTINAL: No abdominal or epigastric pain. No nausea, vomiting, or hematemesis; No diarrhea or constipation. No melena or hematochezia.  GENITOURINARY: No dysuria, frequency or hematuria  NEUROLOGICAL: No numbness or weakness  SKIN: No itching, burning, rashes, or lesions   All other review of systems is negative unless indicated above    Vital Signs Last 24 Hrs  T(C): 37.1 (20 Nov 2018 11:13), Max: 37.1 (20 Nov 2018 11:13)  T(F): 98.7 (20 Nov 2018 11:13), Max: 98.7 (20 Nov 2018 11:13)  HR: 75 (20 Nov 2018 11:13) (75 - 91)  BP: 133/75 (20 Nov 2018 11:13) (116/89 - 141/84)  BP(mean): --  RR: 17 (20 Nov 2018 11:13) (17 - 19)  SpO2: 97% (20 Nov 2018 11:13) (96% - 97%)    I&O's Summary      CAPILLARY BLOOD GLUCOSE      POCT Blood Glucose.: 253 mg/dL (20 Nov 2018 10:50)  POCT Blood Glucose.: 152 mg/dL (20 Nov 2018 07:41)  POCT Blood Glucose.: 207 mg/dL (19 Nov 2018 23:18)  POCT Blood Glucose.: 209 mg/dL (19 Nov 2018 17:09)      PHYSICAL EXAM:    Constitutional: Obese NAD, awake and alert, obese  Respiratory: Breath sounds are clear bilaterally, No wheezing, rales or rhonchi  Cardiovascular: S1 and S2, regular rate and rhythm, no Murmurs, gallops or rubs  Gastrointestinal: Bowel Sounds present, soft, nontender, nondistended, no guarding, no rebound  Extremities: LLE peripheral edema noted, with erythematous changes consistent with venous insufficiency. RLE tender to palpation.  B/L lower extremities less edematous.   Vascular: 1+ peripheral pulses  Neuro: AAO X3 ,DTR 2/4 intact strength       MEDICATIONS:  MEDICATIONS  (STANDING):  ascorbic acid 500 milliGRAM(s) Oral daily  aspirin  chewable 81 milliGRAM(s) Oral daily  atorvastatin 10 milliGRAM(s) Oral at bedtime  bisacodyl 5 milliGRAM(s) Oral at bedtime  collagenase Ointment 1 Application(s) Topical daily  dextrose 5%. 1000 milliLiter(s) (50 mL/Hr) IV Continuous <Continuous>  dextrose 50% Injectable 12.5 Gram(s) IV Push once  dextrose 50% Injectable 25 Gram(s) IV Push once  dextrose 50% Injectable 25 Gram(s) IV Push once  diltiazem    Tablet 120 milliGRAM(s) Oral two times a day  docusate sodium 100 milliGRAM(s) Oral daily  doxycycline hyclate Capsule 100 milliGRAM(s) Oral every 12 hours  ferrous    sulfate 325 milliGRAM(s) Oral daily  furosemide    Tablet 80 milliGRAM(s) Oral every 12 hours  gabapentin 300 milliGRAM(s) Oral three times a day  insulin lispro (HumaLOG) corrective regimen sliding scale   SubCutaneous three times a day before meals  insulin lispro (HumaLOG) corrective regimen sliding scale   SubCutaneous at bedtime  magnesium oxide 400 milliGRAM(s) Oral two times a day with meals  metoprolol succinate  milliGRAM(s) Oral daily  montelukast 10 milliGRAM(s) Oral at bedtime  potassium chloride    Tablet ER 40 milliEquivalent(s) Oral two times a day  silver sulfADIAZINE 1% Cream 1 Application(s) Topical daily  Uceris (Budesonide EC) 9 milliGRAM(s) 9 milliGRAM(s) Oral daily  warfarin 6 milliGRAM(s) Oral daily      LABS: All Labs Reviewed:                        13.0   12.30 )-----------( 466      ( 20 Nov 2018 05:02 )             39.9     11-20    137  |  97  |  22  ----------------------------<  161<H>  3.2<L>   |  30  |  0.54    Ca    8.9      20 Nov 2018 05:02  Phos  3.7     11-20  Mg     1.9     11-20    TPro  x   /  Alb  2.6<L>  /  TBili  x   /  DBili  x   /  AST  x   /  ALT  x   /  AlkPhos  x   11-20    PT/INR - ( 20 Nov 2018 05:02 )   PT: 24.8 sec;   INR: 2.18 ratio               Blood Culture:     RADIOLOGY/EKG:    DVT PPX:    ADVANCED DIRECTIVE:    DISPOSITION:

## 2018-11-20 NOTE — PHARMACOTHERAPY INTERVENTION NOTE - COMMENTS
Recommended discontinuing heparin SC for patient with therapeutic INR
med history complete, reviewed medications with patient
Vancomycin first dose weight based dosing as per policy. Order changed from Vancomycin 1000mg ivpb once to 1750mg ivpb once.

## 2018-11-20 NOTE — PROGRESS NOTE ADULT - SUBJECTIVE AND OBJECTIVE BOX
Pt has been seen and examined with FP resident, resident supervised agree with a/p       Patient is a 68y old  Female who presents with a chief complaint of Fevers (18 Nov 2018 13:09)      PHYSICAL EXAM:    general- comfortable   -[id number]-aeeb,cta  -cvs-s1s2 normal   -p/a-soft,bs+          A/P    #d/c today with further management as an outpt. Time spent 55 minutes     #discussed with Dr. Mayer. Follow up with him within 2 days for adjustment of lasix dose and monitoring of electrolytes and renal function on this dose of lasix- discussed this with pt

## 2018-11-20 NOTE — PROGRESS NOTE ADULT - ASSESSMENT
68y F PMHx of DM, CHF, Venous Insufficiency, Ulcerative Colitis, HTN, HLD, recurrent cellulitis presents to [facility] on 11/13/18, with fevers starting the night before admission and RLE pain 2/2 cellulitis.    #RLE Cellulitis   ~in setting of resolving infected R foot hematoma  -monitor temp and WBC  -Continue cefepime 2 g BID and doxy 100mg BID for mrsa coverage  -D/C on PO doxycycline for 4 more days total 10 days as per the ID   -Blood cx- NGTD, Ucx- NGTD  -ID recs appreciated  -Vascular recs appreciated: -Bilateral LE compression dressing with collagenase and Ace bandage, Pain control, Leg elevation, silver sulfadiazine cream  -Arterial duplex - no stenosis      #CHF ~ pEF   -11/15 - SOB and wheezing overnight -Lasix and duoneb given  -Continue Lasix 80 mg IV BID   -Metolazone 5 mg PO QD - Monitor BUN/Cr closely -- will not continue on discharge   -duoneb q6h PRN   - echo- EF 55-60% w/ mild aortic stenosis, mild concentric LV hypertrophy  -continue metoprolol- will increased to BID if tachycardiac occurs      #Afib  -CHADSVASc= 5   -continue diltiazem 120 mg BID and coumadin and ASA  -INR =1.81  -Continue Warfarin 6 mg    #DM2  -ISS  -Gabapentin 300 mg TID       #HTN  -Metoprolol 100 mg QD   -Cardio recs appreciated- can increase metoprolol to Q12, if HR increases      #HLD  -Continue atorvastatin    #Anemia  -resolved  -continue ferrous sulfate       #UC  -Continue Uceris   -Continue bentyl   -Continue Colace and dulcolax     #DISPO  - home w/ home PT- face to face completed 68y F PMHx of DM, CHF, Venous Insufficiency, Ulcerative Colitis, HTN, HLD, recurrent cellulitis presents to [facility] on 11/13/18, with fevers starting the night before admission and RLE pain 2/2 cellulitis.    #RLE Cellulitis with Venous Insufficieny   -Resolved   -monitor temp and WBC  -Continue cefepime 2 g BID and doxy 100mg BID for MRSA coverage  -D/C on PO doxycycline for 4 more days total 10 days as per the ID   -Blood cx- NGTD, Ucx- NGTD  -ID recs appreciated  -Vascular recs appreciated: -Bilateral LE compression dressing with collagenase and Ace bandage, Pain control, Leg elevation, silver sulfadiazine cream ,collagenase .  -Arterial duplex - no stenosis      #Chronic heart failure with preserved Ejection fraction  -11/15 - SOB and wheezing overnight -Lasix and duoneb given  -Continue Lasix 80 mg IV BID changed to 80 mg PO BID orally  - s/p Metolazone 5 mg PO QD  -duoneb q6h PRN   - echo- EF 55-60% w/ mild aortic stenosis, mild concentric LV hypertrophy  -continue metoprolol      #Atrial fibrillation   -CHADSVASc= 5   -continue diltiazem 120 mg BID ,coumadin and Metoprolol   -INR =2.18  -Continue Warfarin 6 mg    #DM2  -Consistent carb diet   -ISS  -Gabapentin 300 mg TID       #HTN  -Metoprolol 100 mg QD   -Cardio recs appreciated- can increase metoprolol to Q12, if HR increases      #HLD  -Continue atorvastatin    #Anemia  -resolved  -continue ferrous sulfate       #Ulcerative colitis   -stable   -continue home medication    #DISPO  - home w/ home PT- face to face completed

## 2018-11-20 NOTE — PROGRESS NOTE ADULT - ASSESSMENT
68 year old woman with the above history admitted with recurrent LE cellulitis.  Her rapid rates related to her atrial fibrillation and HFpEF are probably secondary to the underlying infection.  Her rates are already improved compared to admission.  Will continue her current medications.  If her rates increase we can increase her metoprolol to Q 12 hours.    11/15/18:  HFpEF/weight gain while in the hospital.  Will change PO furosemide to IV and increase the dose.  KCL as well.    11/17/18:  Back on Lasix and diuresing and feeling better.  No increased SOB.  No anginal chest pains.  AF rate controlled on the monitor on her current meds.  No new cardiac issues so far.    11/18/18:  Slowly improving.  Right leg cellulitis being treated but still painful to touch. AF rate controlled.  INR's need to be followed and Coumadin dose adjusted accordingly while on antibiotics and meds.  No new cardiac changes.  Less SOB on Lasix.  Continue as per medicine etal.  Dr Mayer will follow intermittently prn.    11/20/18:  Cardiac status appears stable.  Patient has lost a decent amount of water weight.  Renal function stable.  Will change furosemide back to PO.  Give extra dose of kcl now.

## 2018-11-20 NOTE — PROGRESS NOTE ADULT - PROVIDER SPECIALTY LIST ADULT
Cardiology
Family Medicine
Hospitalist
Hospitalist
Infectious Disease
Vascular Surgery
Family Medicine

## 2018-11-20 NOTE — PROGRESS NOTE ADULT - SUBJECTIVE AND OBJECTIVE BOX
CHIEF COMPLAINT: Patient is a 68y old  Female who presents with a chief complaint of Fevers (13 Nov 2018 23:01)      HPI: 11/14/18:  Patient is 68y F PMHx of DM, CHF, Afib, HFpEF, Venous Insufficiency, Ulcerative Colitis, HTN, HLD, presents to [facility] on 11/13/18, with fevers starting the night before admission and RLE pain. Patient with chronic ulcer of the posterior calf of the RLE, has been causing recurrent cellulitis with multiple hospital admissions since 8/18, patient admits that in the past couple of days her leg has been having increased swelling, erythema and pain. Pain is non-radiating constant severe burning pain with no alleviation to NSAIDs and Percocet. Patient admits that the pain is associated with chills, nausea with heaving no vomiting, as well as shortness of breath and palpitations, found to be in Afib RVR in the ED. Patient also admits some diarrhea but is associated with her UC. (13 Nov 2018 23:01)    11/15/18:  Patient required IV furosemide early this am    11/17/18:  Back on Lasix and diuresing and feeling better.  No increased SOB.  No anginal chest pains.  AF rate controlled on the monitor on her current meds.  No new cardiac issues so far.    11/18/18:  Slowly improving.  Right leg cellulitis being treated but still painful to touch. AF rate controlled.  INR's need to be followed and Coumadin dose adjusted accordingly while on antibiotics and meds.  No new cardiac changes.  Less SOB on Lasix.        PMHx:  PAST MEDICAL & SURGICAL HISTORY:  HTN (hypertension)  Thyroid nodule  Peripheral venous insufficiency  Neuropathy  Morbid obesity with BMI of 40.0-44.9, adult  Dyspnea  Congestive heart failure  Atrial fibrillation  Diabetes mellitus type II, controlled  Status post medial meniscus repair  S/P left knee arthroscopy  Other complications of gastric band procedure  H/O colonoscopy  History of esophagogastroduodenoscopy (EGD)      FAMILY HISTORY:   FAMILY HISTORY:  Family history of diabetes mellitus in mother (Mother)  Family history of breast cancer in mother (Mother)      ALLERGIES:  Allergies  penicillin (Hives)        REVIEW OF SYSTEMS:    CONSTITUTIONAL: No fevers or chills  EYES/ENT: No visual changes;  No vertigo or throat pain   NECK: No pain or stiffness  RESPIRATORY: No hemoptysis;   CARDIOVASCULAR: No chest pain or palpitations  GASTROINTESTINAL: No abdominal or epigastric pain. No nausea, vomiting, or hematemesis; No diarrhea or constipation. No melena or hematochezia.  GENITOURINARY: No dysuria, frequency or hematuria  NEUROLOGICAL: No numbness  All other review of systems is negative unless indicated above    ICU Vital Signs Last 24 Hrs  T(C): 36.8 (20 Nov 2018 06:04), Max: 36.8 (20 Nov 2018 06:04)  T(F): 98.2 (20 Nov 2018 06:04), Max: 98.2 (20 Nov 2018 06:04)  HR: 83 (20 Nov 2018 06:04) (83 - 93)  BP: 141/84 (20 Nov 2018 06:04) (116/89 - 141/84)  BP(mean): --  ABP: --  ABP(mean): --  RR: 18 (20 Nov 2018 06:04) (18 - 20)  SpO2: 97% (20 Nov 2018 06:04) (96% - 97%)        POCT Blood Glucose.: 182 mg/dL (18 Nov 2018 07:43)  POCT Blood Glucose.: 119 mg/dL (17 Nov 2018 21:27)  POCT Blood Glucose.: 266 mg/dL (17 Nov 2018 18:05)  POCT Blood Glucose.: 168 mg/dL (17 Nov 2018 12:35)      PHYSICAL EXAM:   Constitutional: NAD, awake and alert, well-developed  HEENT: PERR, EOMI, Normal Hearing, MMM  Neck: Soft and supple, No LAD, No JVD  Respiratory: Breath sounds are clear bilaterally, No wheezing, rales or rhonchi  Cardiovascular: S1 and S2, irregular rate and irregular rhythm, soft SHAHAB at LLSB and base as before, no gallops or rubs  Gastrointestinal: Bowel Sounds present, soft, nontender, nondistended, no guarding, no rebound  Extremities: No peripheral edema  Vascular: 2+ peripheral pulses  Neurological: A/O x 3, no focal deficits      MEDICATIONS  (STANDING):  ascorbic acid 500 milliGRAM(s) Oral daily  aspirin  chewable 81 milliGRAM(s) Oral daily  atorvastatin 10 milliGRAM(s) Oral at bedtime  bisacodyl 5 milliGRAM(s) Oral at bedtime  collagenase Ointment 1 Application(s) Topical daily  dextrose 5%. 1000 milliLiter(s) (50 mL/Hr) IV Continuous <Continuous>  dextrose 50% Injectable 12.5 Gram(s) IV Push once  dextrose 50% Injectable 25 Gram(s) IV Push once  dextrose 50% Injectable 25 Gram(s) IV Push once  diltiazem    Tablet 120 milliGRAM(s) Oral two times a day  docusate sodium 100 milliGRAM(s) Oral daily  doxycycline hyclate Capsule 100 milliGRAM(s) Oral every 12 hours  ferrous    sulfate 325 milliGRAM(s) Oral daily  furosemide   Injectable 80 milliGRAM(s) IV Push every 12 hours  gabapentin 300 milliGRAM(s) Oral three times a day  heparin  Injectable 5000 Unit(s) SubCutaneous every 12 hours  insulin lispro (HumaLOG) corrective regimen sliding scale   SubCutaneous three times a day before meals  insulin lispro (HumaLOG) corrective regimen sliding scale   SubCutaneous at bedtime  magnesium oxide 400 milliGRAM(s) Oral two times a day with meals  metoprolol succinate  milliGRAM(s) Oral daily  montelukast 10 milliGRAM(s) Oral at bedtime  potassium chloride    Tablet ER 40 milliEquivalent(s) Oral two times a day  silver sulfADIAZINE 1% Cream 1 Application(s) Topical daily  Uceris (Budesonide EC) 9 milliGRAM(s) 9 milliGRAM(s) Oral daily  warfarin 6 milliGRAM(s) Oral daily        LABS: All Labs Reviewed:                        12.5   8.39  )-----------( 407      ( 18 Nov 2018 05:29 )             39.2                11.4   7.86  )-----------( 366      ( 17 Nov 2018 04:13 )             35.3     11-20    137  |  97  |  22  ----------------------------<  161<H>  3.2<L>   |  30  |  0.54    Ca    8.9      20 Nov 2018 05:02  Phos  3.7     11-20  Mg     1.9     11-20    TPro  x   /  Alb  2.6<L>  /  TBili  x   /  DBili  x   /  AST  x   /  ALT  x   /  AlkPhos  x   11-20 11-18    142  |  106  |  18  ----------------------------<  126<H>  3.4<L>   |  27  |  0.51      11-17    143  |  107  |  18  ----------------------------<  124<H>  3.5   |  30  |  0.50    Ca    8.6      18 Nov 2018 05:29  Ca    8.1<L>      17 Nov 2018 04:13    Phos  3.3     11-18  Phos  3.2     11-17    Mg     1.8     11-18  Mg     1.6     11-17    PT/INR - ( 18 Nov 2018 05:29 )   PT: 20.0 sec;   INR: 1.77 ratio    PT/INR - ( 17 Nov 2018 04:13 )   PT: 17.6 sec;   INR: 1.56 ratio       PTT - ( 17 Nov 2018 04:13 )  PTT:30.8 sec      Serum Pro-Brain Natriuretic Peptide: 1965 pg/mL (11-13 @ 16:33)  Serum Pro-Brain Natriuretic Peptide: 2002 pg/mL (11-13 @ 16:00)      BLOOD CULTURES: Organism --  Gram Stain Blood -- Gram Stain --  Specimen Source .Urine Clean Catch (Midstream)  Culture-Blood --Culture Results:   No growth (11.13.18 @ 19:35)      Organism --  Gram Stain Blood -- Gram Stain --  Specimen Source .Blood None  Culture-Blood --Culture Results:   No growth to date. (11.13.18 @ 16:00)        LIPID PROFILE     RADIOLOGY:    Arterial Doppler Lower Ext:  11/15/18:  No hemodynamically significant stenosis, thrombus or occlusion is identified within the right common femoral artery, femoral artery, popliteal artery, however diffuse monophasic waveforms are visualized.   The right anterior tibial, posterior tibial, peroneal, and dorsalis pedis arteries could not be interrogated secondary to overlying unremovable bandages.    Velocities are as follows (cm/sec):  Common femoral artery: 130  Proximal femoral artery: 202  Mid proximal artery: 143  Distal femoral artery: 1:15  Popliteal artery: 89    IMPRESSION:   No hemodynamically significant stenosis, thrombus or occlusion is identified within the right common femoral artery, femoral artery, popliteal artery, however diffuse monophasic waveforms are visualized.   The right anterior tibial, posterior tibial, peroneal, and dorsalis pedis arteries could not be interrogated secondary to overlying unremovable bandages.        CT of Right Lower Ext:  11/13/18:   CR XR LOWER EXTREMITY RIGHT 11/13/2018 5:45 PM     FINDINGS:    Bones/joints:  Severe tricompartment degenerative changes of the knee. Small partially visualized the patella joint effusion with calcifications. Degenerative changes in the foot. Old nonunited fractures in the medial malleolus. No acute bony abnormality identified. No obvious bony destructive changes. Calcaneal spurs.    Soft tissues:  Extensive diffuse soft tissue edema and skin thickening. No loculated fluid collection. Possible skin ulceration in the posterior calf.    Vasculature:  Arteriosclerosis.     IMPRESSION:   1. Extensive diffuse soft tissue edema and skin thickening. No loculated fluid collection. Possible skin ulceration in the posterior calf.   2. No acute bony abnormality or obvious bony destructive changes.   3. Degenerative changes of the bones.   4. Arteriosclerosis.   5. Other findings as described above.    CT LWR EXT IC RT dated 11/13/2018 6:20 PM     INDICATION: Right lower leg pain and swelling with wound.    COMPARISON: Ankle radiographs dated 11/13/2018    TECHNIQUE: CT imaging of the right lower extremity was performed with contrast. The data was reformatted in the axial, coronal, and sagittal planes.   Contrast: Omnipaque 350. Administered: 150 cc per discarded: 50 cc.    FINDINGS:    OSSEOUS STRUCTURES: There is no cortical erosion or destruction to suggest osteomyelitis. No periosteal reaction is seen. There is severe osteoarthritis of the knee with marked joint space narrowing the patellofemoral compartment. Severe narrowing of the medial knee joint space. Subchondral sclerosis and cystic changes are noted. There are degenerative changes at the midfoot joint with moderate to severe narrowing at the second through fourth TMT's. There are subchondral cystic changes at the second TMT.  SYNOVIUM/ JOINT FLUID: There is partially imaged joint effusion at the knee.  TENDONS: The tendons are intact. No full-thickness tendon tear or retraction is noted.  MUSCLES: There is no intramuscular hematoma. There is a small intramuscular lipoma involving the medial gastrocnemius muscle inferiorly measuring up to 1.6 cm.  NEUROVASCULAR STRUCTURES: There is moderate to severe vascular calcification present.  SUBCUTANEOUS SOFT TISSUES: There is circumferential soft tissue swelling about the calf. There is mild associated skin thickening. There is a small suspected skin defect at the posterior aspect of the lower calf measuring up to 1.4 cm. Skin irregularity is noted along the dorsum of the midfoot and forefoot with overlying bandage. A focal confluent area of hypodensity is appreciated overlying the base of the first metatarsal measuring approximately 1.0 x 1.0 x 3.1 cm (series 2, image 214).    IMPRESSION:  1.  Circumferential soft tissue swelling about the calf with extension   into the foot. Nonspecific but can be seen in the setting of cellulitis.  2.  Focal area of hypodensity in the soft tissues over the base of the   first metatarsal concerning for phlegmonous change versus early abscess   formation.  3.  No CT evidence for osteomyelitis. If concern persists, MRI or bone   scan can be performed.  	    CT of ABD: 11/13/18:  LIVER: Focal fat in the right lobe.  SPLEEN: Normal.  PANCREAS: Normal.  GALLBLADDER/BILIARY TREE: Mild postcholecystectomy dilatation.  ADRENALS: Normal.  KIDNEYS: No calculi, hydronephrosis, or soft tissue attenuating renal mass.  LYMPHADENOPATHY/RETROPERITONEUM: No adenopathy.  VASCULATURE: Aortoiliac atherosclerosis without aneurysm.  BOWEL: No bowel-related abnormality. Specifically, no evidence of acute diverticulitis. Normal appendix and ileocecal region. No obstruction.  PELVIC VISCERA: Decreased size of right adnexal cyst measuring 4.6 cm, previously 5.7 cm.  PELVIC LYMPH NODES: No pelvic adenopathy.  PERITONEUM/ABDOMINAL WALL: No free air or ascites. No fluid collection.  SKELETAL: No acute bony abnormality.  LUNG BASES: Clear.  IMPRESSION:   No evidence of intra-abdominal or pelvic sepsis.    CXR: 11/13/18:  The lungs are clear. There is no pleural effusion. There is no pneumothorax. There is borderline cardiomegaly. There are degenerative changes.    X-ray of right ankle and Tibia:  11/13/18:  RIGHT TIB-FIB:   No acute fracture or dislocation.   Extensive vascular calcifications.  RIGHT ANKLE:   No acute fracture or dislocation.   Prominent posterior and plantar calcaneal spurs.   Extensive vascular calcifications.      X-ray right Foot: 11/13/18:  No acute fracture or dislocation. No bony erosions to suggest acute osteomyelitis.  Moderate forefoot soft tissue swelling. Extensive vascular calcifications. No soft tissue air.  Prominent calcaneal plantar spur.    EKG:  Atrial fibrillation with a rapid VR; PRWP    Telemetry:  Atrial fibrillation with a controlled VR    ECHO:  11/15/18:   Mitral Valve   Mild to moderate mitral regurgitation is present.     Aortic Valve   Significant fibrocalcific changes noted to the aortic valve leaflets with restriction in leaflet excursion. Peak transaortic gradient is 27 mmHg; this finding is consistent with mild aortic stenosis.   Trace aortic regurgitation is present.     Tricuspid Valve   Moderate (2+) tricuspid valve regurgitation is present.     Pulmonic Valve   Normal appearing pulmonic valve structure and function.     Left Atrium   The left atrium is moderately dilated.     Left Ventricle   The left ventricle is normal in size, wall motion and contractility.   Mild concentric left ventricular hypertrophy is present.   Estimated left ventricular ejection fraction is 55-60 %.     Right Atrium   The right atrium appears moderately dilated.     Right Ventricle   Normal appearing right ventricle structure and function.     Pericardial Effusion   No evidence of pericardial effusion.     Pleural Effusion   No evidence of pleural effusion.     Miscellaneous   All visualized extra cardiac structures appears to be normal.     Summary   Mild to moderate mitral regurgitation is present.   Significant fibrocalcific changes noted to the aortic valve leaflets with restriction in leaflet excursion. Peak transaortic gradient is 27 mmHg; this finding is consistent with mild aortic stenosis.   Trace aortic regurgitation is present.   Moderate (2+) tricuspid valve regurgitation is present.   Normal appearing pulmonic valve structure and function.   The left atrium is moderately dilated.   The left ventricle is normal in size, wall motion and contractility.   Mild concentric left ventricular hypertrophy is present.   Estimated left ventricular ejection fraction is 55-60 %.   The right atrium appears moderately dilated.   Normal appearing right ventricle structure and function.   All visualized extra cardiac structures appears to be normal.   No evidence of pericardial effusion.

## 2018-11-26 DIAGNOSIS — E78.5 HYPERLIPIDEMIA, UNSPECIFIED: ICD-10-CM
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
## 2018-11-26 DIAGNOSIS — I11.0 HYPERTENSIVE HEART DISEASE WITH HEART FAILURE: ICD-10-CM

## 2018-11-26 DIAGNOSIS — I48.2 CHRONIC ATRIAL FIBRILLATION: ICD-10-CM

## 2018-11-26 DIAGNOSIS — A41.9 SEPSIS, UNSPECIFIED ORGANISM: ICD-10-CM

## 2018-11-26 DIAGNOSIS — I50.33 ACUTE ON CHRONIC DIASTOLIC (CONGESTIVE) HEART FAILURE: ICD-10-CM

## 2018-11-26 DIAGNOSIS — E11.51 TYPE 2 DIABETES MELLITUS WITH DIABETIC PERIPHERAL ANGIOPATHY WITHOUT GANGRENE: ICD-10-CM
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
## 2018-11-26 DIAGNOSIS — Z98.84 BARIATRIC SURGERY STATUS: ICD-10-CM

## 2018-11-26 DIAGNOSIS — S90.31XA CONTUSION OF RIGHT FOOT, INITIAL ENCOUNTER: ICD-10-CM

## 2018-11-26 DIAGNOSIS — I35.0 NONRHEUMATIC AORTIC (VALVE) STENOSIS: ICD-10-CM

## 2018-11-26 DIAGNOSIS — L03.115 CELLULITIS OF RIGHT LOWER LIMB: ICD-10-CM
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
## 2018-11-26 DIAGNOSIS — I87.2 VENOUS INSUFFICIENCY (CHRONIC) (PERIPHERAL): ICD-10-CM

## 2018-11-26 DIAGNOSIS — Z87.891 PERSONAL HISTORY OF NICOTINE DEPENDENCE: ICD-10-CM

## 2018-11-26 DIAGNOSIS — E11.40 TYPE 2 DIABETES MELLITUS WITH DIABETIC NEUROPATHY, UNSPECIFIED: ICD-10-CM

## 2018-11-26 DIAGNOSIS — E66.01 MORBID (SEVERE) OBESITY DUE TO EXCESS CALORIES: ICD-10-CM

## 2018-11-26 DIAGNOSIS — D64.9 ANEMIA, UNSPECIFIED: ICD-10-CM
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
## 2018-11-26 DIAGNOSIS — K51.90 ULCERATIVE COLITIS, UNSPECIFIED, WITHOUT COMPLICATIONS: ICD-10-CM

## 2018-11-28 ENCOUNTER — APPOINTMENT (OUTPATIENT)
Dept: VASCULAR SURGERY | Facility: CLINIC | Age: 68
End: 2018-11-28
Payer: MEDICARE

## 2018-11-28 VITALS
BODY MASS INDEX: 38.65 KG/M2 | HEIGHT: 70 IN | SYSTOLIC BLOOD PRESSURE: 132 MMHG | HEART RATE: 98 BPM | WEIGHT: 270 LBS | DIASTOLIC BLOOD PRESSURE: 84 MMHG | OXYGEN SATURATION: 96 %

## 2018-11-28 PROCEDURE — 99212 OFFICE O/P EST SF 10 MIN: CPT

## 2018-12-03 ENCOUNTER — APPOINTMENT (OUTPATIENT)
Dept: VASCULAR SURGERY | Facility: CLINIC | Age: 68
End: 2018-12-03
Payer: MEDICARE

## 2018-12-03 VITALS
WEIGHT: 270 LBS | BODY MASS INDEX: 38.65 KG/M2 | HEART RATE: 98 BPM | TEMPERATURE: 98.3 F | SYSTOLIC BLOOD PRESSURE: 130 MMHG | DIASTOLIC BLOOD PRESSURE: 80 MMHG | HEIGHT: 70 IN

## 2018-12-03 PROCEDURE — 99213 OFFICE O/P EST LOW 20 MIN: CPT

## 2018-12-10 ENCOUNTER — APPOINTMENT (OUTPATIENT)
Dept: VASCULAR SURGERY | Facility: CLINIC | Age: 68
End: 2018-12-10
Payer: MEDICARE

## 2018-12-10 VITALS
DIASTOLIC BLOOD PRESSURE: 86 MMHG | TEMPERATURE: 97.5 F | HEART RATE: 97 BPM | BODY MASS INDEX: 38.65 KG/M2 | HEIGHT: 70 IN | WEIGHT: 270 LBS | SYSTOLIC BLOOD PRESSURE: 144 MMHG | OXYGEN SATURATION: 97 %

## 2018-12-10 PROCEDURE — 99213 OFFICE O/P EST LOW 20 MIN: CPT

## 2018-12-17 ENCOUNTER — APPOINTMENT (OUTPATIENT)
Dept: VASCULAR SURGERY | Facility: CLINIC | Age: 68
End: 2018-12-17
Payer: MEDICARE

## 2018-12-17 VITALS
TEMPERATURE: 98 F | HEART RATE: 112 BPM | SYSTOLIC BLOOD PRESSURE: 153 MMHG | DIASTOLIC BLOOD PRESSURE: 82 MMHG | HEIGHT: 70 IN | BODY MASS INDEX: 38.65 KG/M2 | WEIGHT: 270 LBS | OXYGEN SATURATION: 95 %

## 2018-12-17 PROCEDURE — 99213 OFFICE O/P EST LOW 20 MIN: CPT

## 2018-12-24 ENCOUNTER — APPOINTMENT (OUTPATIENT)
Dept: VASCULAR SURGERY | Facility: CLINIC | Age: 68
End: 2018-12-24
Payer: MEDICARE

## 2018-12-24 VITALS
OXYGEN SATURATION: 94 % | HEIGHT: 70 IN | TEMPERATURE: 99.2 F | DIASTOLIC BLOOD PRESSURE: 85 MMHG | HEART RATE: 130 BPM | WEIGHT: 270 LBS | SYSTOLIC BLOOD PRESSURE: 138 MMHG | BODY MASS INDEX: 38.65 KG/M2

## 2018-12-24 PROCEDURE — 99213 OFFICE O/P EST LOW 20 MIN: CPT

## 2018-12-30 ENCOUNTER — INPATIENT (INPATIENT)
Facility: HOSPITAL | Age: 68
LOS: 4 days | Discharge: HOME HEALTH | End: 2019-01-04
Attending: INTERNAL MEDICINE | Admitting: INTERNAL MEDICINE
Payer: MEDICARE

## 2018-12-30 VITALS
OXYGEN SATURATION: 90 % | HEART RATE: 109 BPM | RESPIRATION RATE: 30 BRPM | HEIGHT: 68 IN | TEMPERATURE: 101 F | DIASTOLIC BLOOD PRESSURE: 85 MMHG | WEIGHT: 250 LBS | SYSTOLIC BLOOD PRESSURE: 114 MMHG

## 2018-12-30 DIAGNOSIS — Z98.890 OTHER SPECIFIED POSTPROCEDURAL STATES: Chronic | ICD-10-CM

## 2018-12-30 DIAGNOSIS — K95.09 OTHER COMPLICATIONS OF GASTRIC BAND PROCEDURE: Chronic | ICD-10-CM

## 2018-12-30 LAB
ALBUMIN SERPL ELPH-MCNC: 2.2 G/DL — LOW (ref 3.3–5)
ALP SERPL-CCNC: 80 U/L — SIGNIFICANT CHANGE UP (ref 40–120)
ALT FLD-CCNC: 26 U/L — SIGNIFICANT CHANGE UP (ref 12–78)
ANION GAP SERPL CALC-SCNC: 10 MMOL/L — SIGNIFICANT CHANGE UP (ref 5–17)
APTT BLD: 35.9 SEC — SIGNIFICANT CHANGE UP (ref 27.5–36.3)
AST SERPL-CCNC: 13 U/L — LOW (ref 15–37)
BASOPHILS # BLD AUTO: 0.06 K/UL — SIGNIFICANT CHANGE UP (ref 0–0.2)
BASOPHILS NFR BLD AUTO: 0.6 % — SIGNIFICANT CHANGE UP (ref 0–2)
BILIRUB SERPL-MCNC: 0.6 MG/DL — SIGNIFICANT CHANGE UP (ref 0.2–1.2)
BUN SERPL-MCNC: 7 MG/DL — SIGNIFICANT CHANGE UP (ref 7–23)
CALCIUM SERPL-MCNC: 7.8 MG/DL — LOW (ref 8.5–10.1)
CHLORIDE SERPL-SCNC: 102 MMOL/L — SIGNIFICANT CHANGE UP (ref 96–108)
CO2 SERPL-SCNC: 26 MMOL/L — SIGNIFICANT CHANGE UP (ref 22–31)
CREAT SERPL-MCNC: 0.68 MG/DL — SIGNIFICANT CHANGE UP (ref 0.5–1.3)
EOSINOPHIL # BLD AUTO: 0.09 K/UL — SIGNIFICANT CHANGE UP (ref 0–0.5)
EOSINOPHIL NFR BLD AUTO: 0.9 % — SIGNIFICANT CHANGE UP (ref 0–6)
GLUCOSE BLDC GLUCOMTR-MCNC: 161 MG/DL — HIGH (ref 70–99)
GLUCOSE SERPL-MCNC: 120 MG/DL — HIGH (ref 70–99)
HCT VFR BLD CALC: 35.8 % — SIGNIFICANT CHANGE UP (ref 34.5–45)
HGB BLD-MCNC: 11.8 G/DL — SIGNIFICANT CHANGE UP (ref 11.5–15.5)
IMM GRANULOCYTES NFR BLD AUTO: 0.9 % — SIGNIFICANT CHANGE UP (ref 0–1.5)
INR BLD: 2.59 RATIO — HIGH (ref 0.88–1.16)
LACTATE SERPL-SCNC: 2.1 MMOL/L — HIGH (ref 0.7–2)
LACTATE SERPL-SCNC: 3 MMOL/L — HIGH (ref 0.7–2)
LACTATE SERPL-SCNC: 3.1 MMOL/L — HIGH (ref 0.7–2)
LYMPHOCYTES # BLD AUTO: 1.13 K/UL — SIGNIFICANT CHANGE UP (ref 1–3.3)
LYMPHOCYTES # BLD AUTO: 11.4 % — LOW (ref 13–44)
MCHC RBC-ENTMCNC: 29.6 PG — SIGNIFICANT CHANGE UP (ref 27–34)
MCHC RBC-ENTMCNC: 33 GM/DL — SIGNIFICANT CHANGE UP (ref 32–36)
MCV RBC AUTO: 89.9 FL — SIGNIFICANT CHANGE UP (ref 80–100)
MONOCYTES # BLD AUTO: 0.74 K/UL — SIGNIFICANT CHANGE UP (ref 0–0.9)
MONOCYTES NFR BLD AUTO: 7.4 % — SIGNIFICANT CHANGE UP (ref 2–14)
NEUTROPHILS # BLD AUTO: 7.83 K/UL — HIGH (ref 1.8–7.4)
NEUTROPHILS NFR BLD AUTO: 78.8 % — HIGH (ref 43–77)
NRBC # BLD: 0 /100 WBCS — SIGNIFICANT CHANGE UP (ref 0–0)
PLATELET # BLD AUTO: 435 K/UL — HIGH (ref 150–400)
POTASSIUM SERPL-MCNC: 3.2 MMOL/L — LOW (ref 3.5–5.3)
POTASSIUM SERPL-SCNC: 3.2 MMOL/L — LOW (ref 3.5–5.3)
PROT SERPL-MCNC: 7.2 GM/DL — SIGNIFICANT CHANGE UP (ref 6–8.3)
PROTHROM AB SERPL-ACNC: 29.6 SEC — HIGH (ref 10–12.9)
RAPID RVP RESULT: SIGNIFICANT CHANGE UP
RBC # BLD: 3.98 M/UL — SIGNIFICANT CHANGE UP (ref 3.8–5.2)
RBC # FLD: 14 % — SIGNIFICANT CHANGE UP (ref 10.3–14.5)
SODIUM SERPL-SCNC: 138 MMOL/L — SIGNIFICANT CHANGE UP (ref 135–145)
TROPONIN I SERPL-MCNC: <0.015 NG/ML — SIGNIFICANT CHANGE UP (ref 0.01–0.04)
WBC # BLD: 9.94 K/UL — SIGNIFICANT CHANGE UP (ref 3.8–10.5)
WBC # FLD AUTO: 9.94 K/UL — SIGNIFICANT CHANGE UP (ref 3.8–10.5)

## 2018-12-30 PROCEDURE — 93010 ELECTROCARDIOGRAM REPORT: CPT

## 2018-12-30 PROCEDURE — 71250 CT THORAX DX C-: CPT | Mod: 26

## 2018-12-30 PROCEDURE — 71045 X-RAY EXAM CHEST 1 VIEW: CPT | Mod: 26

## 2018-12-30 PROCEDURE — 99285 EMERGENCY DEPT VISIT HI MDM: CPT

## 2018-12-30 RX ORDER — GLUCAGON INJECTION, SOLUTION 0.5 MG/.1ML
1 INJECTION, SOLUTION SUBCUTANEOUS ONCE
Qty: 0 | Refills: 0 | Status: DISCONTINUED | OUTPATIENT
Start: 2018-12-30 | End: 2019-01-04

## 2018-12-30 RX ORDER — DEXTROSE 50 % IN WATER 50 %
25 SYRINGE (ML) INTRAVENOUS ONCE
Qty: 0 | Refills: 0 | Status: DISCONTINUED | OUTPATIENT
Start: 2018-12-30 | End: 2019-01-04

## 2018-12-30 RX ORDER — ACETAMINOPHEN 500 MG
1000 TABLET ORAL ONCE
Qty: 0 | Refills: 0 | Status: COMPLETED | OUTPATIENT
Start: 2018-12-30 | End: 2018-12-30

## 2018-12-30 RX ORDER — GABAPENTIN 400 MG/1
300 CAPSULE ORAL AT BEDTIME
Qty: 0 | Refills: 0 | Status: DISCONTINUED | OUTPATIENT
Start: 2018-12-30 | End: 2019-01-04

## 2018-12-30 RX ORDER — ACETAMINOPHEN 500 MG
650 TABLET ORAL EVERY 6 HOURS
Qty: 0 | Refills: 0 | Status: DISCONTINUED | OUTPATIENT
Start: 2018-12-30 | End: 2019-01-04

## 2018-12-30 RX ORDER — POTASSIUM CHLORIDE 20 MEQ
40 PACKET (EA) ORAL ONCE
Qty: 0 | Refills: 0 | Status: COMPLETED | OUTPATIENT
Start: 2018-12-30 | End: 2018-12-30

## 2018-12-30 RX ORDER — METOPROLOL TARTRATE 50 MG
100 TABLET ORAL DAILY
Qty: 0 | Refills: 0 | Status: DISCONTINUED | OUTPATIENT
Start: 2018-12-30 | End: 2019-01-04

## 2018-12-30 RX ORDER — POTASSIUM CHLORIDE 20 MEQ
20 PACKET (EA) ORAL
Qty: 0 | Refills: 0 | Status: DISCONTINUED | OUTPATIENT
Start: 2018-12-30 | End: 2019-01-04

## 2018-12-30 RX ORDER — SODIUM CHLORIDE 9 MG/ML
500 INJECTION INTRAMUSCULAR; INTRAVENOUS; SUBCUTANEOUS ONCE
Qty: 0 | Refills: 0 | Status: COMPLETED | OUTPATIENT
Start: 2018-12-30 | End: 2018-12-30

## 2018-12-30 RX ORDER — DEXTROSE 50 % IN WATER 50 %
12.5 SYRINGE (ML) INTRAVENOUS ONCE
Qty: 0 | Refills: 0 | Status: DISCONTINUED | OUTPATIENT
Start: 2018-12-30 | End: 2019-01-04

## 2018-12-30 RX ORDER — SODIUM CHLORIDE 9 MG/ML
1000 INJECTION INTRAMUSCULAR; INTRAVENOUS; SUBCUTANEOUS
Qty: 0 | Refills: 0 | Status: COMPLETED | OUTPATIENT
Start: 2018-12-30 | End: 2018-12-31

## 2018-12-30 RX ORDER — FERROUS SULFATE 325(65) MG
325 TABLET ORAL DAILY
Qty: 0 | Refills: 0 | Status: DISCONTINUED | OUTPATIENT
Start: 2018-12-30 | End: 2019-01-04

## 2018-12-30 RX ORDER — WARFARIN SODIUM 2.5 MG/1
5 TABLET ORAL ONCE
Qty: 0 | Refills: 0 | Status: COMPLETED | OUTPATIENT
Start: 2018-12-30 | End: 2018-12-30

## 2018-12-30 RX ORDER — MONTELUKAST 4 MG/1
10 TABLET, CHEWABLE ORAL AT BEDTIME
Qty: 0 | Refills: 0 | Status: DISCONTINUED | OUTPATIENT
Start: 2018-12-30 | End: 2019-01-04

## 2018-12-30 RX ORDER — ATORVASTATIN CALCIUM 80 MG/1
10 TABLET, FILM COATED ORAL AT BEDTIME
Qty: 0 | Refills: 0 | Status: DISCONTINUED | OUTPATIENT
Start: 2018-12-30 | End: 2019-01-04

## 2018-12-30 RX ORDER — INSULIN LISPRO 100/ML
VIAL (ML) SUBCUTANEOUS
Qty: 0 | Refills: 0 | Status: DISCONTINUED | OUTPATIENT
Start: 2018-12-30 | End: 2019-01-04

## 2018-12-30 RX ORDER — ASPIRIN/CALCIUM CARB/MAGNESIUM 324 MG
81 TABLET ORAL DAILY
Qty: 0 | Refills: 0 | Status: DISCONTINUED | OUTPATIENT
Start: 2018-12-30 | End: 2019-01-04

## 2018-12-30 RX ORDER — DEXTROSE 50 % IN WATER 50 %
15 SYRINGE (ML) INTRAVENOUS ONCE
Qty: 0 | Refills: 0 | Status: DISCONTINUED | OUTPATIENT
Start: 2018-12-30 | End: 2019-01-04

## 2018-12-30 RX ORDER — SODIUM CHLORIDE 9 MG/ML
1000 INJECTION, SOLUTION INTRAVENOUS
Qty: 0 | Refills: 0 | Status: DISCONTINUED | OUTPATIENT
Start: 2018-12-30 | End: 2019-01-04

## 2018-12-30 RX ORDER — ASCORBIC ACID 60 MG
500 TABLET,CHEWABLE ORAL DAILY
Qty: 0 | Refills: 0 | Status: DISCONTINUED | OUTPATIENT
Start: 2018-12-30 | End: 2019-01-04

## 2018-12-30 RX ADMIN — SODIUM CHLORIDE 500 MILLILITER(S): 9 INJECTION INTRAMUSCULAR; INTRAVENOUS; SUBCUTANEOUS at 12:03

## 2018-12-30 RX ADMIN — Medication 20 MILLIEQUIVALENT(S): at 22:15

## 2018-12-30 RX ADMIN — GABAPENTIN 300 MILLIGRAM(S): 400 CAPSULE ORAL at 22:15

## 2018-12-30 RX ADMIN — Medication 1000 MILLIGRAM(S): at 15:20

## 2018-12-30 RX ADMIN — WARFARIN SODIUM 5 MILLIGRAM(S): 2.5 TABLET ORAL at 22:15

## 2018-12-30 RX ADMIN — SODIUM CHLORIDE 500 MILLILITER(S): 9 INJECTION INTRAMUSCULAR; INTRAVENOUS; SUBCUTANEOUS at 13:00

## 2018-12-30 RX ADMIN — MONTELUKAST 10 MILLIGRAM(S): 4 TABLET, CHEWABLE ORAL at 22:15

## 2018-12-30 RX ADMIN — SODIUM CHLORIDE 500 MILLILITER(S): 9 INJECTION INTRAMUSCULAR; INTRAVENOUS; SUBCUTANEOUS at 22:03

## 2018-12-30 RX ADMIN — Medication 1: at 23:59

## 2018-12-30 RX ADMIN — Medication 40 MILLIEQUIVALENT(S): at 13:25

## 2018-12-30 NOTE — ED PROVIDER NOTE - CONSTITUTIONAL, MLM
normal... mildly uncomfortable appearing,  awake, alert, oriented to person, place, time/situation and in no apparent distress.

## 2018-12-30 NOTE — H&P ADULT - HISTORY OF PRESENT ILLNESS
69 yo F with PMH significant for HTN, HLD, CHF HFpEF, Afib on Coumadin Morbid Obesity, Chronic venous insufficiency DM2 presents to the ED c/o fever and SOB x 7 days. Pt reported that she has been having fever 99-101F at home x 7 days  and exertional dyspnea for the past 2-3 days. Pt reported that she was seen at Dr. Mayer office and was prescribed Doxycycline PO which Pt has been taking for the past 6 days with no significant improvement so came to the ED for eval. On arrival to the ED, Fever 101.1F, Tachycardiac Afib with RVR HR: 140's on arrival , now 's. ON labs, Lactate: 3.1, Pt received IVf Ns 500 cc x 1 in the ED. RVP panel negative. c/o mild dysuria.  Denies headache dizziness, chest pain, palpitation or SOB at rest. Denies cough, diarrhea, abdominal pain, N/V. Denies recent travel. Former smoker, quit about 30 + yrs ago.     Family h/o: Pt reported that Mother had hx of Breast Ca  in her 80's.   Social h/o: Former smoker, quit about 30 + yrs ago, Denies drinking alcohol. 69 yo F with PMH significant for HTN, HLD, CHF HFpEF, Afib on Coumadin, Morbid Obesity, Chronic venous insufficiency,  DM2 presents to the ED c/o fever and SOB x 7 days.     Pt reported that she has been having fever 99-101F at home x 7 days  and exertional dyspnea for the past 2-3 days. Pt reported that she was seen at Dr. Mayer office and was prescribed Doxycycline PO which Pt has been taking for the past 6 days with no significant improvement so came to the ED for eval. On arrival to the ED, Fever 101.1F, Tachycardiac Afib with RVR HR: 140's on arrival , now 's. ON labs, Lactate: 3.1, Pt received IVf Ns 500 cc x 1 in the ED. RVP panel negative. c/o mild dysuria.  Denies headache dizziness, chest pain, palpitation or SOB at rest. Denies cough, diarrhea, abdominal pain, N/V. Denies recent travel. Former smoker, quit about 30 + yrs ago.     Family h/o: Pt reported that Mother had hx of Breast Ca  in her 80's.   Social h/o: Former smoker, quit about 30 + yrs ago, Denies drinking alcohol.

## 2018-12-30 NOTE — ED ADULT NURSE NOTE - CHIEF COMPLAINT QUOTE
Pt ambulatory to triage, tachypneic, c/o SOB, JETT, fever, Hx CHF, oxygen saturation on RA is 90-91%, EI=[id number]  Pt to main for STAT EKG and VS

## 2018-12-30 NOTE — ED ADULT NURSE NOTE - NSIMPLEMENTINTERV_GEN_ALL_ED
Implemented All Fall Risk Interventions:  [place] to call system. Call bell, personal items and telephone within reach. Instruct patient to call for assistance. Room bathroom lighting operational. Non-slip footwear when patient is off stretcher. Physically safe environment: no spills, clutter or unnecessary equipment. Stretcher in lowest position, wheels locked, appropriate side rails in place. Provide visual cue, wrist band, yellow gown, etc. Monitor gait and stability. Monitor for mental status changes and reorient to person, place, and time. Review medications for side effects contributing to fall risk. Reinforce activity limits and safety measures with patient and family.

## 2018-12-30 NOTE — H&P ADULT - NSHPPHYSICALEXAM_GEN_ALL_CORE
Vital Signs Last 24 Hrs  T(C): 36.6 (30 Dec 2018 19:27), Max: 38.4 (30 Dec 2018 11:10)  T(F): 97.8 (30 Dec 2018 19:27), Max: 101.1 (30 Dec 2018 11:10)  HR: 81 (30 Dec 2018 19:27) (79 - 109)  BP: 104/61 (30 Dec 2018 19:27) (100/63 - 115/78)  RR: 18 (30 Dec 2018 19:27) (17 - 30)  SpO2: 97% (30 Dec 2018 19:27) (90% - 99%)

## 2018-12-30 NOTE — ED PROVIDER NOTE - MUSCULOSKELETAL, MLM
Spine appears normal, range of motion is not limited, no muscle or joint tenderness. +B/L LE SWELLING.

## 2018-12-30 NOTE — ED ADULT TRIAGE NOTE - CHIEF COMPLAINT QUOTE
Pt ambulatory to triage, tachypneic, c/o SOB, JETT, fever, Hx CHF, oxygen saturation on RA is 90-91%, AG=182  Pt to main for STAT EKG and VS

## 2018-12-30 NOTE — ED PROVIDER NOTE - OBJECTIVE STATEMENT
69 y/o F with a PMHx of AFib, HTN, morbid obesity presents ot the ED c/o difficulty breathing, cough, fever (99.9 - 101.5) x 7 days. Pt saw PMD : CXR reports ? fluid in lungs. Pt also c/o +nausea, no vomiting. No diarrhea. PMD . 69 y/o F with a PMHx of AFib, HTN, Morbid Obesity presents ot the ED c/o difficulty breathing, cough, fever (99.9 - 101.5) x 7 days. Pt also c/o +nausea. No vomiting. No diarrhea. PMD . 67 y/o F with a PMHx of AFib, HTN, Morbid Obesity presents to the ED c/o difficulty breathing, cough, fever (99.9 - 101.5) x 7 days. Pt also c/o +nausea. No vomiting. No diarrhea. PMD .

## 2018-12-30 NOTE — H&P ADULT - ASSESSMENT
69 yo F with PMH significant for HTN, HLD, CHF HFpEF, Afib on Coumadin Morbid Obesity, Chronic venous insufficiency DM2 presents to the ED c/o fever and SOB x 7 days.    # Fever, SOB: r/o PNA  # JETT 2/2 to acute on chronic CHF exacerbation?  # Febrile, Tachycardic , elevated lactate  - Admit to   - RVP not detected  - Lactate 2.1<<3.1, Pt received IVf Ns 500 cc bolus x 1 in the ED, Pt is on Metformin at home   - Pt is on PO Doxycycline at home  - CT chest stat  - Tylenol prn  - Daily weight check  - Strict I&O  - Last Echo 11/2018, EF: 55-60%, Mild AS, Trace AR. 2+TR  - Lasix on hold for now as lactate is elevated  - will wait for CT chest result     # Afib with RVR on arrival   - Hx of Afib  - On Tele: HR: 's now  - C/w Diltiazem, Toprol XL  - c/w Coumadin  - Monitor INR    # Hypokalemia  - replete and recheck    # Thrombocytosis  - Likely reactive  - Monitor Plt    # Hx of DM2  -Fingerstick check  - HBA1C  - Diabetic diet  - ISS    # Hx of UC  - c/w home meds  - stable    # HTN  - c/w BB, Diltiazem  - Monitor BP    # HLD  - c/w statin  - lipid panel    # DVT Ppx  - On coumadin    IMPROVE VTE Individual Risk Assessment    RISK                                                                Points    [  ] Previous VTE                                                  3    [  ] Thrombophilia                                               2    [  ] Lower limb paralysis                                      2        (unable to hold up >15 seconds)      [  ] Current Cancer                                              2         (within 6 months)    [ x ] Immobilization > 24 hrs                                1    [  ] ICU/CCU stay > 24 hours                              1    [x  ] Age > 60                                                      1    IMPROVE VTE Score ______2___    case d/w  69 yo F with PMH significant for HTN, HLD, CHF HFpEF, Afib on Coumadin Morbid Obesity, Chronic venous insufficiency DM2 presents to the ED c/o fever and SOB x 7 days.    # Fever, SOB: r/o PNA  # JETT 2/2 to acute on chronic CHF exacerbation?  # Febrile, Tachycardic , elevated lactate  - Admit to Surgical SD  - RVP not detected  - Lactate 2.1<<3.1, Pt received IVf Ns 500 cc bolus x 1 in the ED, Pt is on Metformin at home   - Pt is on PO Doxycycline at home  - CT chest stat  - Tylenol prn  - Daily weight check  - Strict I&O  - Last Echo 11/2018, EF: 55-60%, Mild AS, Trace AR. 2+TR  - Lasix on hold for now as lactate is elevated  - will wait for CT chest result   - C/w IVF NS    # Afib with RVR on arrival   - Hx of Afib  - On Tele: HR: 's now  - C/w Diltiazem, Toprol XL  - c/w Coumadin  - Monitor INR    # Hypokalemia  - replete and recheck    # Thrombocytosis  - Likely reactive  - Monitor Plt    # Hx of DM2  -Fingerstick check  - HBA1C  - Diabetic diet  - ISS    # Hx of UC  - c/w home meds  - stable at present  - D/w Pt is Any abdominal pain or UC flare up during hospitalization let the RN or MD know immediately    # HTN  - c/w BB, Diltiazem  - Monitor BP    # HLD  - c/w statin  - lipid panel    # DVT Ppx  - On coumadin    IMPROVE VTE Individual Risk Assessment    RISK                                                                Points    [  ] Previous VTE                                                  3    [  ] Thrombophilia                                               2    [  ] Lower limb paralysis                                      2        (unable to hold up >15 seconds)      [  ] Current Cancer                                              2         (within 6 months)    [ x ] Immobilization > 24 hrs                                1    [  ] ICU/CCU stay > 24 hours                              1    [x  ] Age > 60                                                      1    IMPROVE VTE Score ______2___    case d/w Dr. Brand 69 yo F with PMH significant for HTN, HLD, CHF HFpEF, Afib on Coumadin Morbid Obesity, Chronic venous insufficiency DM2 presents to the ED c/o fever and SOB x 7 days.    # Fever, SOB: r/o PNA  # Febrile, Tachycardic , elevated lactate  # Hx of CHF  # Dehydration  - Admit to Surgical SD  - RVP not detected  - Lactate 2.1<<3.1, Pt received IVf Ns 500 cc bolus x 1 in the ED, Pt is on Metformin at home   - Pt is on PO Doxycycline at home  - CT chest stat  - Tylenol prn  - Daily weight check  - Strict I&O  - Last Echo 11/2018, EF: 55-60%, Mild AS, Trace AR. 2+TR  - Lasix on hold for now as lactate is elevated  - will wait for CT chest result   - C/w IVF NS  - Cardio consult    # Afib with RVR on arrival to the ED  - Hx of Afib  - On Tele: HR: 's now  - C/w Diltiazem, Toprol XL  - c/w Coumadin  - Monitor INR    # Hypokalemia  - replete and recheck    # RLE wound care  - Pt is requesting Dr. Yoo inpt consult     # Thrombocytosis  - Likely reactive  - Monitor Plt    # Hx of DM2  -Fingerstick check  - HBA1C  - Diabetic diet  - ISS    # Hx of UC  - c/w home meds  - stable at present  - D/w Pt is Any abdominal pain or UC flare up during hospitalization let the RN or MD know immediately    # HTN  - c/w BB, Diltiazem  - Monitor BP    # HLD  - c/w statin  - lipid panel    # DVT Ppx  - On coumadin    IMPROVE VTE Individual Risk Assessment    RISK                                                                Points    [  ] Previous VTE                                                  3    [  ] Thrombophilia                                               2    [  ] Lower limb paralysis                                      2        (unable to hold up >15 seconds)      [  ] Current Cancer                                              2         (within 6 months)    [ x ] Immobilization > 24 hrs                                1    [  ] ICU/CCU stay > 24 hours                              1    [x  ] Age > 60                                                      1    IMPROVE VTE Score ______2___    case d/w Dr. Brand 67 yo F with PMH significant for HTN, HLD, CHF HFpEF, Afib on Coumadin Morbid Obesity, Chronic venous insufficiency DM2 presents to the ED c/o fever and SOB x 7 days.    # Fever, SOB: r/o PNA  # Febrile, Tachycardic , elevated lactate  # Hx of CHF  # Dehydration  - Admit to Surgical SD  - RVP not detected  - Lactate 2.1<<3.1, Pt received IVf Ns 500 cc bolus x 1 in the ED, Pt is on Metformin at home   - Pt is on PO Doxycycline at home  - CT chest stat  - Tylenol prn  - Daily weight check  - Strict I&O  - Last Echo 11/2018, EF: 55-60%, Mild AS, Trace AR. 2+TR  - Lasix on hold for now as lactate is elevated  - will wait for CT chest result   - C/w IVF NS  - Cardio consult    # Afib with RVR on arrival to the ED  - Hx of Afib  - On Tele: HR: 's now  - C/w Diltiazem, Toprol XL  - c/w Coumadin  - Monitor INR    # Hypokalemia  - replete and recheck    # Venous ulcer disease   RLE wound care has been done weekly by Dr. Yoo  - Pt is requesting Dr. Yoo  consult     # Thrombocytosis  - Likely reactive  - Monitor Plt    # Hx of DM2  -Fingerstick check  - HBA1C  - Diabetic diet  - ISS    # Hx of UC  - c/w home meds  - stable at present  - D/w Pt is Any abdominal pain or UC flare up during hospitalization let the RN or MD know immediately    # HTN  - c/w BB, Diltiazem  - Monitor BP    # HLD  - c/w statin  - lipid panel    # DVT Ppx  - On coumadin    IMPROVE VTE Individual Risk Assessment    RISK                                                                Points    [  ] Previous VTE                                                  3    [  ] Thrombophilia                                               2    [  ] Lower limb paralysis                                      2        (unable to hold up >15 seconds)      [  ] Current Cancer                                              2         (within 6 months)    [ x ] Immobilization > 24 hrs                                1    [  ] ICU/CCU stay > 24 hours                              1    [x  ] Age > 60                                                      1    IMPROVE VTE Score ______2___    case d/w Dr. Brand

## 2018-12-30 NOTE — H&P ADULT - ATTENDING COMMENTS
68 yr old female wHTN, HLD, CHF HFpEF, Afib on Coumadin, Morbid Obesity, Chronic venous insufficiency,  DM2  who presented w fever  Pt seen and examined w EVAN Hastings at bedside.  Hx, physical exam and A/P as edited and outlined above    # fever         w metabolic acidosis /elevated lactate in a DM on metformin        no clear source  #AFIB w RVR continue w coumadin and rate control    # reported decreased urine volume over past few days: took lasix 80 mg and barely made urine  #hx CHF w opacities in RUL, RML w/o cough or SOB, nl EF  # chronic venous ulcers that appear stable, not infected  # DM holding oral agent and BGM w ISS    upgraded from floor to SSD; consult cardio

## 2018-12-30 NOTE — H&P ADULT - ADDITIONAL PE
dressing to RLE removed  + hyperpigmented stasis changes w 3 wounds and edema from pretibial area to toes; pt prefers that Dr. Yoo clean and dress it.

## 2018-12-30 NOTE — ED ADULT NURSE NOTE - OBJECTIVE STATEMENT
Pt presents to ED from home, pt alert and orientedx4, febrile, inc HR of 124, pt c/o fever, and SOb for the past week, pt seen by PCP and told she had fluid on lungs, pt has hx of CHF, safety maintained, will continue to monitor

## 2018-12-30 NOTE — ED PROVIDER NOTE - SKIN, MLM
Skin normal color for race, warm, dry and intact. No evidence of rash. Skin normal color for race, warm, dry and intact. Bilateral lower ext with venous stasis changes -- wounds right lower ext being followed by vascular (no drainage/signs of infection)

## 2018-12-31 ENCOUNTER — APPOINTMENT (OUTPATIENT)
Dept: VASCULAR SURGERY | Facility: CLINIC | Age: 68
End: 2018-12-31

## 2018-12-31 LAB
ALBUMIN SERPL ELPH-MCNC: 2 G/DL — LOW (ref 3.3–5)
ALP SERPL-CCNC: 70 U/L — SIGNIFICANT CHANGE UP (ref 40–120)
ALT FLD-CCNC: 22 U/L — SIGNIFICANT CHANGE UP (ref 12–78)
ANION GAP SERPL CALC-SCNC: 9 MMOL/L — SIGNIFICANT CHANGE UP (ref 5–17)
APPEARANCE UR: CLEAR — SIGNIFICANT CHANGE UP
APTT BLD: 34.5 SEC — SIGNIFICANT CHANGE UP (ref 27.5–36.3)
AST SERPL-CCNC: 12 U/L — LOW (ref 15–37)
BACTERIA # UR AUTO: ABNORMAL
BASOPHILS # BLD AUTO: 0.03 K/UL — SIGNIFICANT CHANGE UP (ref 0–0.2)
BASOPHILS NFR BLD AUTO: 0.3 % — SIGNIFICANT CHANGE UP (ref 0–2)
BILIRUB SERPL-MCNC: 0.5 MG/DL — SIGNIFICANT CHANGE UP (ref 0.2–1.2)
BILIRUB UR-MCNC: NEGATIVE — SIGNIFICANT CHANGE UP
BUN SERPL-MCNC: 10 MG/DL — SIGNIFICANT CHANGE UP (ref 7–23)
CALCIUM SERPL-MCNC: 7.4 MG/DL — LOW (ref 8.5–10.1)
CHLORIDE SERPL-SCNC: 106 MMOL/L — SIGNIFICANT CHANGE UP (ref 96–108)
CHOLEST SERPL-MCNC: 135 MG/DL — SIGNIFICANT CHANGE UP (ref 10–199)
CO2 SERPL-SCNC: 26 MMOL/L — SIGNIFICANT CHANGE UP (ref 22–31)
COLOR SPEC: YELLOW — SIGNIFICANT CHANGE UP
CREAT SERPL-MCNC: 0.44 MG/DL — LOW (ref 0.5–1.3)
DIFF PNL FLD: NEGATIVE — SIGNIFICANT CHANGE UP
EOSINOPHIL # BLD AUTO: 0.03 K/UL — SIGNIFICANT CHANGE UP (ref 0–0.5)
EOSINOPHIL NFR BLD AUTO: 0.3 % — SIGNIFICANT CHANGE UP (ref 0–6)
EPI CELLS # UR: SIGNIFICANT CHANGE UP
GLUCOSE BLDC GLUCOMTR-MCNC: 112 MG/DL — HIGH (ref 70–99)
GLUCOSE BLDC GLUCOMTR-MCNC: 113 MG/DL — HIGH (ref 70–99)
GLUCOSE BLDC GLUCOMTR-MCNC: 161 MG/DL — HIGH (ref 70–99)
GLUCOSE BLDC GLUCOMTR-MCNC: 296 MG/DL — HIGH (ref 70–99)
GLUCOSE SERPL-MCNC: 122 MG/DL — HIGH (ref 70–99)
GLUCOSE UR QL: NEGATIVE MG/DL — SIGNIFICANT CHANGE UP
HCT VFR BLD CALC: 32.4 % — LOW (ref 34.5–45)
HDLC SERPL-MCNC: 37 MG/DL — LOW
HGB BLD-MCNC: 10.7 G/DL — LOW (ref 11.5–15.5)
IMM GRANULOCYTES NFR BLD AUTO: 0.9 % — SIGNIFICANT CHANGE UP (ref 0–1.5)
INR BLD: 1.96 RATIO — HIGH (ref 0.88–1.16)
KETONES UR-MCNC: NEGATIVE — SIGNIFICANT CHANGE UP
LACTATE SERPL-SCNC: 0.7 MMOL/L — SIGNIFICANT CHANGE UP (ref 0.7–2)
LEUKOCYTE ESTERASE UR-ACNC: ABNORMAL
LIPID PNL WITH DIRECT LDL SERPL: 81 MG/DL — SIGNIFICANT CHANGE UP
LYMPHOCYTES # BLD AUTO: 0.96 K/UL — LOW (ref 1–3.3)
LYMPHOCYTES # BLD AUTO: 10.5 % — LOW (ref 13–44)
MAGNESIUM SERPL-MCNC: 1.2 MG/DL — LOW (ref 1.6–2.6)
MCHC RBC-ENTMCNC: 30.4 PG — SIGNIFICANT CHANGE UP (ref 27–34)
MCHC RBC-ENTMCNC: 33 GM/DL — SIGNIFICANT CHANGE UP (ref 32–36)
MCV RBC AUTO: 92 FL — SIGNIFICANT CHANGE UP (ref 80–100)
MONOCYTES # BLD AUTO: 0.59 K/UL — SIGNIFICANT CHANGE UP (ref 0–0.9)
MONOCYTES NFR BLD AUTO: 6.5 % — SIGNIFICANT CHANGE UP (ref 2–14)
NEUTROPHILS # BLD AUTO: 7.42 K/UL — HIGH (ref 1.8–7.4)
NEUTROPHILS NFR BLD AUTO: 81.5 % — HIGH (ref 43–77)
NITRITE UR-MCNC: NEGATIVE — SIGNIFICANT CHANGE UP
NRBC # BLD: 0 /100 WBCS — SIGNIFICANT CHANGE UP (ref 0–0)
PH UR: 5 — SIGNIFICANT CHANGE UP (ref 5–8)
PHOSPHATE SERPL-MCNC: 2.9 MG/DL — SIGNIFICANT CHANGE UP (ref 2.5–4.5)
PLATELET # BLD AUTO: 416 K/UL — HIGH (ref 150–400)
POTASSIUM SERPL-MCNC: 3.7 MMOL/L — SIGNIFICANT CHANGE UP (ref 3.5–5.3)
POTASSIUM SERPL-SCNC: 3.7 MMOL/L — SIGNIFICANT CHANGE UP (ref 3.5–5.3)
PROT SERPL-MCNC: 6.6 GM/DL — SIGNIFICANT CHANGE UP (ref 6–8.3)
PROT UR-MCNC: 15 MG/DL
PROTHROM AB SERPL-ACNC: 22.2 SEC — HIGH (ref 10–12.9)
RBC # BLD: 3.52 M/UL — LOW (ref 3.8–5.2)
RBC # FLD: 14.2 % — SIGNIFICANT CHANGE UP (ref 10.3–14.5)
RBC CASTS # UR COMP ASSIST: SIGNIFICANT CHANGE UP /HPF (ref 0–4)
SODIUM SERPL-SCNC: 141 MMOL/L — SIGNIFICANT CHANGE UP (ref 135–145)
SP GR SPEC: 1.02 — SIGNIFICANT CHANGE UP (ref 1.01–1.02)
TOTAL CHOLESTEROL/HDL RATIO MEASUREMENT: 3.6 RATIO — SIGNIFICANT CHANGE UP (ref 3.3–7.1)
TRIGL SERPL-MCNC: 85 MG/DL — SIGNIFICANT CHANGE UP (ref 10–149)
TSH SERPL-MCNC: 0.22 UU/ML — LOW (ref 0.34–4.82)
UROBILINOGEN FLD QL: 1 MG/DL
WBC # BLD: 9.11 K/UL — SIGNIFICANT CHANGE UP (ref 3.8–10.5)
WBC # FLD AUTO: 9.11 K/UL — SIGNIFICANT CHANGE UP (ref 3.8–10.5)
WBC UR QL: ABNORMAL

## 2018-12-31 PROCEDURE — 93308 TTE F-UP OR LMTD: CPT | Mod: 26

## 2018-12-31 RX ORDER — PANTOPRAZOLE SODIUM 20 MG/1
40 TABLET, DELAYED RELEASE ORAL
Qty: 0 | Refills: 0 | Status: DISCONTINUED | OUTPATIENT
Start: 2018-12-31 | End: 2019-01-04

## 2018-12-31 RX ORDER — WARFARIN SODIUM 2.5 MG/1
5 TABLET ORAL DAILY
Qty: 0 | Refills: 0 | Status: DISCONTINUED | OUTPATIENT
Start: 2018-12-31 | End: 2019-01-04

## 2018-12-31 RX ORDER — MAGNESIUM SULFATE 500 MG/ML
2 VIAL (ML) INJECTION ONCE
Qty: 0 | Refills: 0 | Status: COMPLETED | OUTPATIENT
Start: 2018-12-31 | End: 2018-12-31

## 2018-12-31 RX ORDER — SUCRALFATE 1 G
1 TABLET ORAL
Qty: 0 | Refills: 0 | Status: DISCONTINUED | OUTPATIENT
Start: 2018-12-31 | End: 2019-01-04

## 2018-12-31 RX ORDER — BUDESONIDE, MICRONIZED 100 %
9 POWDER (GRAM) MISCELLANEOUS DAILY
Qty: 0 | Refills: 0 | Status: DISCONTINUED | OUTPATIENT
Start: 2018-12-31 | End: 2019-01-04

## 2018-12-31 RX ORDER — CEFEPIME 1 G/1
2000 INJECTION, POWDER, FOR SOLUTION INTRAMUSCULAR; INTRAVENOUS EVERY 12 HOURS
Qty: 0 | Refills: 0 | Status: DISCONTINUED | OUTPATIENT
Start: 2018-12-31 | End: 2019-01-04

## 2018-12-31 RX ADMIN — MONTELUKAST 10 MILLIGRAM(S): 4 TABLET, CHEWABLE ORAL at 22:21

## 2018-12-31 RX ADMIN — Medication 500 MILLIGRAM(S): at 11:25

## 2018-12-31 RX ADMIN — Medication 325 MILLIGRAM(S): at 11:25

## 2018-12-31 RX ADMIN — Medication 20 MILLIEQUIVALENT(S): at 17:48

## 2018-12-31 RX ADMIN — Medication 1 GRAM(S): at 17:48

## 2018-12-31 RX ADMIN — ATORVASTATIN CALCIUM 10 MILLIGRAM(S): 80 TABLET, FILM COATED ORAL at 22:21

## 2018-12-31 RX ADMIN — SODIUM CHLORIDE 100 MILLILITER(S): 9 INJECTION INTRAMUSCULAR; INTRAVENOUS; SUBCUTANEOUS at 00:00

## 2018-12-31 RX ADMIN — Medication 20 MILLIEQUIVALENT(S): at 05:33

## 2018-12-31 RX ADMIN — CEFEPIME 100 MILLIGRAM(S): 1 INJECTION, POWDER, FOR SOLUTION INTRAMUSCULAR; INTRAVENOUS at 17:47

## 2018-12-31 RX ADMIN — Medication 50 GRAM(S): at 12:52

## 2018-12-31 RX ADMIN — Medication 1: at 11:31

## 2018-12-31 RX ADMIN — Medication 9 MILLIGRAM(S): at 17:48

## 2018-12-31 RX ADMIN — WARFARIN SODIUM 5 MILLIGRAM(S): 2.5 TABLET ORAL at 22:22

## 2018-12-31 RX ADMIN — GABAPENTIN 300 MILLIGRAM(S): 400 CAPSULE ORAL at 22:21

## 2018-12-31 RX ADMIN — Medication 81 MILLIGRAM(S): at 11:25

## 2018-12-31 RX ADMIN — Medication 100 MILLIGRAM(S): at 05:34

## 2018-12-31 NOTE — ADVANCED PRACTICE NURSE CONSULT - ASSESSMENT
Pt awake and alert. Risks and benefits of midline catheter explained, verbal consent obtained. Arrow endurance extended dwell 20g, 8cm catheter, lot #[id number], placed in left forearm under ultrasound guidance and sterile precautions. Brisk blood return, flushes well with 10ml normal saline. OK to use.

## 2018-12-31 NOTE — CONSULT NOTE ADULT - ASSESSMENT
67 y/o female admitted with fevers/SOB noting to have RUQ PNA with multiple medical issues.   Now with complaints of dysphagia after being on doxycyline for the past several days.     Impression:  Likely pill esophagitis 2/2 to recent doxycyline use, with pna/reflux playing a role-doubt esophageal stricture.    Plan:  Currently tolerating diet without difficulty.   IV ABX per ID.   Avoid doxycyline as this is likely playing a role.   Will start PPI and carafate.   If patient doesn't continue to improve will plan for esophagram and/or EGD as outpatient with Dr. Hancock.    Last EGD was done on 1/24/2018-noting non-bleeding gastric ulcer.     Discussed with patient, nurse. 67 y/o female admitted with fevers/SOB noting to have RUQ PNA with multiple medical issues.   Now with complaints of dysphagia after being on doxycyline for the past several days.   UC-concerns she is not on budesonide at present time, but prefers Uceris as symptoms are better controlled-will order now as this is safe to administrate during an infectious state.      Impression:  Likely pill esophagitis 2/2 to recent doxycyline use, with pna/reflux playing a role-doubt esophageal stricture.    Plan:  Currently tolerating diet without difficulty.   IV ABX per ID.   Avoid doxycyline as this is likely playing a role.   Will start PPI and carafate.   If patient doesn't continue to improve will plan for esophagram and/or EGD as outpatient with Dr. Hancock.    Last EGD was done on 1/24/2018-noting non-bleeding gastric ulcer.     Discussed with patient, nurse, and Dr. Collins. 67 y/o female admitted with fevers/SOB noting to have RUQ PNA with multiple medical issues.   Now with complaints of dysphagia after being on doxycyline for the past several days.   UC-concerns she is not on budesonide at present time, but prefers Uceris as symptoms are better controlled.  We do not have Uceris in hospital, will start budesonide now-safe to administrate during an infectious state.      Impression:  Likely pill esophagitis 2/2 to recent doxycyline use, with pna/reflux playing a role-doubt esophageal stricture.    Plan:  Currently tolerating diet without difficulty.   IV ABX per ID.   Avoid doxycyline as this is likely playing a role.   Will start PPI and carafate.   If patient doesn't continue to improve will plan for esophagram and/or EGD as outpatient with Dr. Hancock.    Last EGD was done on 1/24/2018-noting non-bleeding gastric ulcer.   Will also start budesonide 9mg PO daily for UC.     Discussed with patient, nurse, and Dr. Collins.

## 2018-12-31 NOTE — CONSULT NOTE ADULT - ASSESSMENT
68 year old woman with the above history admitted with fever and shortness of breath.  There are new abnormal findings in the right lung on CT of the chest.  Consider pulmonary consult.  She was given IV fluids in the ER with slowing of her atrial fibrillation rate.  Will hold on diuretics at the present time.  Will discuss with hospitalist regarding her current status.  No evidence for ACS

## 2018-12-31 NOTE — CONSULT NOTE ADULT - ASSESSMENT
67 y/o Female with h/o HTN, HLD, CHF HFpEF, A.fib on Coumadin, morbid Obesity, chronic venous insufficiency, recurrent legs cellulitis, DM2 was admitted on 12/30 for fever and SOB x 7 days. Pt reported that she has been having fever to 99-101F at home x 7 days  and exertional dyspnea for the past 2-3 days. Pt reported that she was seen at Dr. Mayer office and was prescribed Doxycycline PO for the past 6 days PTA with no significant improvement. On arrival to the ED, she had fever to 101.1F, tachycardiac Afib with RVR. She received levofloxacin.    1. Febrile syndrome. Dyspnea. RUL pneumonia. Allergy to PCN. 69 y/o Female with h/o HTN, HLD, CHF HFpEF, A.fib on Coumadin, morbid Obesity, chronic venous insufficiency, recurrent legs cellulitis, DM2 was admitted on 12/30 for fever and SOB x 7 days. Pt reported that she has been having fever to 99-101F at home x 7 days  and exertional dyspnea for the past 2-3 days. Pt reported that she was seen at Dr. Mayer office and was prescribed Doxycycline PO for the past 6 days PTA with no significant improvement. On arrival to the ED, she had fever to 101.1F, tachycardiac Afib with RVR. She received levofloxacin.    1. Febrile syndrome. Dyspnea. RUL pneumonia. Allergy to PCN.  -concerned about more resistant organisms since the patient was hospitalized recently and failed outpatient oral abx/ doxycycline  -obtain BC x 2  -obtain sputum c/s if available  -start cefepime 2 gm IV q12h  -reason for abx use and side effects reviewed with patient; monitor BMP   -monitor closely in ej of PCN allergy history  -old chart reviewed to assess prior cultures  -monitor temps  -f/u CBC  -supportive care  2. Other issues:   -care per medicine

## 2018-12-31 NOTE — PROGRESS NOTE ADULT - SUBJECTIVE AND OBJECTIVE BOX
c/c: fever/sob.     HPI:  69 yo F with PMH significant for HTN, HLD, CHF HFpEF, Afib on Coumadin, Morbid Obesity, Chronic venous insufficiency,  DM2 presents to the ED c/o fever and SOB x 7 days.  Pt reported that she has been having fever 99-101F at home x 7 days  and exertional dyspnea for the past 2-3 days. Pt reported that she was seen at Dr. Mayer office and was prescribed Doxycycline PO which Pt has been taking for the past 6 days with no significant improvement so came to the ED for eval. On arrival to the ED, Fever 101.1F, Tachycardiac Afib with RVR HR: 140's on arrival , now 's. ON labs, Lactate: 3.1, Pt received IVf Ns 500 cc x 1 in the ED. RVP panel negative. c/o mild dysuria.   She was admitted with rapid a fib and further further workup. Her HR improved with IVF. CT showed RIght sided pna.     12/31: pt seen and examined this am. States she still feels sob but appears comfortable. Minimal cough. No chest pain. She does admit to difficulty swallowing liquids and sometimes coughs intermittently with po intake. Sees DR. Hancock, Had EGD last year which she does not recall results of. +LE edema++.     Review of system- All 10 systems reviewed and is as per HPI otherwise negative.     VITALS  T(C): 37.2 (12-31-18 @ 14:32), Max: 37.2 (12-31-18 @ 14:32)  HR: 86 (12-31-18 @ 15:00) (68 - 96)  BP: 110/57 (12-31-18 @ 14:39) (88/50 - 128/54)  RR: 24 (12-31-18 @ 15:00) (15 - 25)  SpO2: 97% (12-31-18 @ 15:00) (93% - 98%)      PHYSICAL EXAM:    GENERAL: Comfortable, Obese,  no acute distress  HEAD:  Atraumatic, Normocephalic  EYES: EOMI, PERRLA  HEENT: Moist mucous membranes  NECK: Supple, No JVD  NERVOUS SYSTEM:  Alert & Oriented X3, non focal  CHEST/LUNG: decreased bs b/l  HEART: s1, s2+, irregular.  ABDOMEN: Soft, Nontender, Nondistended; Bowel sounds present  GENITOURINARY- Voiding, no palpable bladder  EXTREMITIES:  No clubbing, cyanosis. B/l LE edema++. RLE in dressing, LLE with erythema over pretibial area which she reports is chronic.  MUSCULOSKELETAL- normal tone.  SKIN-erythema over LLE pretibial area.         LABS:                        10.7   9.11  )-----------( 416      ( 31 Dec 2018 06:04 )             32.4     12-31    141  |  106  |  10  ----------------------------<  122<H>  3.7   |  26  |  0.44<L>    Ca    7.4<L>      31 Dec 2018 06:04  Phos  2.9     12-31  Mg     1.2     12-31    TPro  6.6  /  Alb  2.0<L>  /  TBili  0.5  /  DBili  x   /  AST  12<L>  /  ALT  22  /  AlkPhos  70  12-31    PT/INR - ( 31 Dec 2018 06:04 )   PT: 22.2 sec;   INR: 1.96 ratio          MEDS  acetaminophen   Tablet .. 650 milliGRAM(s) Oral every 6 hours PRN  ascorbic acid 500 milliGRAM(s) Oral daily  aspirin enteric coated 81 milliGRAM(s) Oral daily  atorvastatin 10 milliGRAM(s) Oral at bedtime  cefepime   IVPB 2000 milliGRAM(s) IV Intermittent every 12 hours  dextrose 40% Gel 15 Gram(s) Oral once PRN  dextrose 5%. 1000 milliLiter(s) IV Continuous <Continuous>  dextrose 50% Injectable 12.5 Gram(s) IV Push once  dextrose 50% Injectable 25 Gram(s) IV Push once  dextrose 50% Injectable 25 Gram(s) IV Push once  diltiazem    Tablet 120 milliGRAM(s) Oral two times a day  ferrous    sulfate 325 milliGRAM(s) Oral daily  gabapentin 300 milliGRAM(s) Oral at bedtime  glucagon  Injectable 1 milliGRAM(s) IntraMuscular once PRN  insulin lispro (HumaLOG) corrective regimen sliding scale   SubCutaneous three times a day before meals  metoprolol succinate  milliGRAM(s) Oral daily  montelukast 10 milliGRAM(s) Oral at bedtime  pantoprazole    Tablet 40 milliGRAM(s) Oral before breakfast  potassium chloride    Tablet ER 20 milliEquivalent(s) Oral two times a day  sucralfate 1 Gram(s) Oral four times a day    ASSESSMENT AND PLAN:  69 yo F with PMH significant for HTN, HLD, CHF HFpEF, Afib on Coumadin Morbid Obesity, Chronic venous insufficiency DM2 presents to the ED c/o fever and SOB x 7 days.    1. Sepsis due to PNA:  -s/p ivf   -ID eval appreciated  -on cefepime  -sputum cx/blood cx.     2. SOB:  -?due to pna  -repeat echo with no pericardial effusion   -INR therapeutic on coumadin on admission, so unlikely PE.   -O2 sats stable  -monitor    3. Dysphagia:  -GI eval    4. Afib with RVR on arrival to the ED  -HR better with ivf  -continue ccb/bb  -dose coumadin to goal inr 2-3.    5. Chronic diastolic CHF:  -Diuretics on hold for now given intravascular depletion.   -re-assess in am.   -(per pt, urine concentrated, decreased urine outpt)    6. RLE ulcer:  -vascular consulted.     7. DMII:  -hold OHA  -SS  -sugars acceptable    8.  Hx of UC  -resume home meds.    9. HTN:  -stable on bb, ccb.    10. HLD:  -statin    11. DVT px.     d/w patient/son at bedside/RN

## 2018-12-31 NOTE — CONSULT NOTE ADULT - SUBJECTIVE AND OBJECTIVE BOX
Vascular surgery called to shantel a 68 year old female with multiple medical comorbidities who is requesting Dr. Carlson for wound care services. Pt receives wound care for chronic venous stasis ulcers of right lower extremity. Usual wound care dressing changes was scheduled for today 12/31/18, however has been hospitalized. Vitals stable.     Vital Signs Last 24 Hrs  T(C): 36.7 (31 Dec 2018 05:00), Max: 38.4 (30 Dec 2018 11:10)  T(F): 98 (31 Dec 2018 05:00), Max: 101.1 (30 Dec 2018 11:10)  HR: 68 (31 Dec 2018 08:00) (68 - 109)  BP: 121/69 (31 Dec 2018 08:00) (100/63 - 128/54)  BP(mean): 81 (31 Dec 2018 08:00) (65 - 89)  RR: 22 (31 Dec 2018 06:00) (15 - 30)  SpO2: 95% (31 Dec 2018 08:00) (90% - 99%)    PHYSICAL EXAM:  Constitutional: NAD, GCS: 15/15  AOX3  Eyes:  WNL  ENMT:  WNL  Neck:  WNL, non tender  Back: Non tender  Respiratory: CTABL  Cardiovascular:  S1+S2+0  Gastrointestinal: Soft, ND , NT  Genitourinary:  WNL  Extremities: NV intact, dressing in place, no erythema/exudate/induration noted   Vascular:  Intact  Neurological: No focal neurological deficit,  CN, motor and sensory system grossly intact.  Skin: WNL  Musculoskeletal: WNL  Psychiatric: Grossly WNL                          10.7   9.11  )-----------( 416      ( 31 Dec 2018 06:04 )             32.4     12-31    141  |  106  |  10  ----------------------------<  122<H>  3.7   |  26  |  0.44<L>    Ca    7.4<L>      31 Dec 2018 06:04  Phos  2.9     12-31  Mg     1.2     12-31    TPro  6.6  /  Alb  2.0<L>  /  TBili  0.5  /  DBili  x   /  AST  12<L>  /  ALT  22  /  AlkPhos  70  12-31
CHIEF COMPLAINT: Patient is a 68y old  Female who presents with a chief complaint of c/o fever and SOB (30 Dec 2018 19:55)      HPI: HPI:  69 yo F with PMH significant for HTN, HLD, CHF HFpEF, Afib on Coumadin, Morbid Obesity, Chronic venous insufficiency,  DM2 presents to the ED c/o fever and SOB x 7 days.     Pt reported that she has been having fever 99-101F at home x 7 days  and exertional dyspnea for the past 2-3 days. Pt reported that she was seen at Dr. Mayer office and was prescribed Doxycycline PO which Pt has been taking for the past 6 days with no significant improvement so came to the ED for eval. On arrival to the ED, Fever 101.1F, Tachycardiac Afib with RVR HR: 140's on arrival , now 's. ON labs, Lactate: 3.1, Pt received IVf Ns 500 cc x 1 in the ED. RVP panel negative. c/o mild dysuria.  Denies headache dizziness, chest pain, palpitation or SOB at rest. Denies cough, diarrhea, abdominal pain, N/V. Denies recent travel. Former smoker, quit about 30 + yrs ago.     Family h/o: Pt reported that Mother had hx of Breast Ca  in her 80's.   Social h/o: Former smoker, quit about 30 + yrs ago, Denies drinking alcohol. (30 Dec 2018 19:55)      PMHx: PAST MEDICAL & SURGICAL HISTORY:  HTN (hypertension)  Thyroid nodule  Peripheral venous insufficiency  Neuropathy  Morbid obesity with BMI of 40.0-44.9, adult  Dyspnea  Congestive heart failure/HFpEF  Atrial fibrillation  Diabetes mellitus type II, controlled  Status post medial meniscus repair  S/P left knee arthroscopy  Other complications of gastric band procedure  H/O colonoscopy  History of esophagogastroduodenoscopy (EGD)        Soc Hx:     FAMILY HISTORY:  Family history of diabetes mellitus in mother (Mother)  Family history of breast cancer in mother (Mother)      Allergies: Allergies    penicillin (Hives)    Intolerances          REVIEW OF SYSTEMS:    As above  No chest pain + shortness of breath  No lightheadedness or syncope  No leg swelling  No palpitations  No claudication-like symptoms    Vital Signs Last 24 Hrs  T(C): 36.7 (31 Dec 2018 05:00), Max: 38.4 (30 Dec 2018 11:10)  T(F): 98 (31 Dec 2018 05:00), Max: 101.1 (30 Dec 2018 11:10)  HR: 69 (31 Dec 2018 07:00) (69 - 109)  BP: 123/60 (31 Dec 2018 07:00) (100/63 - 128/54)  BP(mean): 77 (31 Dec 2018 07:00) (65 - 89)  RR: 22 (31 Dec 2018 06:00) (15 - 30)  SpO2: 93% (31 Dec 2018 07:00) (90% - 99%)    I&O's Summary    30 Dec 2018 07:01  -  31 Dec 2018 07:00  --------------------------------------------------------  IN: 620 mL / OUT: 0 mL / NET: 620 mL        CAPILLARY BLOOD GLUCOSE      POCT Blood Glucose.: 161 mg/dL (30 Dec 2018 23:57)      PHYSICAL EXAM:   Patient in NAD  Neck: No JVD; Carotids:  2+ without bruits  Respiratory:  Clear to A&P  Cardiovascular: S1 and S2, irregular rate and rhythm, no Murmurs, gallops or rubs  Gastrointestinal:  Soft, non-tender; BS positive  Chronic bilateral LE edema/venous stasis changes  Neurological: A/O x 3, no focal deficits      MEDICATIONS:  MEDICATIONS  (STANDING):  ascorbic acid 500 milliGRAM(s) Oral daily  aspirin enteric coated 81 milliGRAM(s) Oral daily  atorvastatin 10 milliGRAM(s) Oral at bedtime  dextrose 5%. 1000 milliLiter(s) (50 mL/Hr) IV Continuous <Continuous>  dextrose 50% Injectable 12.5 Gram(s) IV Push once  dextrose 50% Injectable 25 Gram(s) IV Push once  dextrose 50% Injectable 25 Gram(s) IV Push once  diltiazem    Tablet 120 milliGRAM(s) Oral two times a day  ferrous    sulfate 325 milliGRAM(s) Oral daily  gabapentin 300 milliGRAM(s) Oral at bedtime  insulin lispro (HumaLOG) corrective regimen sliding scale   SubCutaneous three times a day before meals  metoprolol succinate  milliGRAM(s) Oral daily  montelukast 10 milliGRAM(s) Oral at bedtime  potassium chloride    Tablet ER 20 milliEquivalent(s) Oral two times a day    Home Medications:  ascorbic acid 500 mg oral tablet: 1 tab(s) orally once a day (30 Dec 2018 20:57)  aspirin 81 mg oral tablet: 1 tab(s) orally once a day (30 Dec 2018 20:57)  Coumadin 5 mg oral tablet: 1 tab(s) orally once a day (30 Dec 2018 20:57)  dilTIAZem 120 mg oral tablet: 1 tab(s) orally 2 times a day (30 Dec 2018 20:57)  docusate sodium 100 mg oral capsule: 1 cap(s) orally once a day, As Needed (30 Dec 2018 20:57)  ferrous sulfate 250 mg oral capsule, extended release: 1 cap(s) orally once a day (30 Dec 2018 20:57)  freetext medication  -: 9 milligram(s) orally once a day (30 Dec 2018 20:57)  gabapentin 300 mg oral capsule: 1 cap(s) orally once a day (at bedtime) (30 Dec 2018 20:57)  metFORMIN 1000 mg oral tablet: 1 tab(s) orally 2 times a day (30 Dec 2018 20:57)  Metoprolol Succinate  mg oral tablet, extended release: 1 tab(s) orally once a day (30 Dec 2018 20:57)  montelukast 10 mg oral tablet: 1 tab(s) orally once a day (at bedtime) (30 Dec 2018 20:57)  potassium chloride 20 mEq oral tablet, extended release: 1 tab(s) orally 2 times a day (30 Dec 2018 20:57)  pravastatin 10 mg oral tablet: 1 tab(s) orally once a day (30 Dec 2018 20:57)  silver sulfADIAZINE 1% topical cream: 1 application topically once a day (30 Dec 2018 20:57)  Uceris 9 mg oral tablet, extended release: 1 tab(s) orally once a day (in the morning) (30 Dec 2018 20:57)        LABS: All Labs Reviewed:  Blood Culture:   BNP   CBC             WBC Count: 9.11 K/uL ( @ 06:04)  WBC Count: 9.94 K/uL ( @ 11:45)              Hemoglobin: 10.7 g/dL ( @ 06:04)  Hemoglobin: 11.8 g/dL ( @ 11:45)              Hematocrit: 32.4 % (12-31 @ 06:04)  Hematocrit: 35.8 % ( @ 11:45)              Mean Cell Volume: 92.0 fl (:04)  Mean Cell Volume: 89.9 fl (:45)              Platelet Count - Automated: 416 K/uL (:04)  Platelet Count - Automated: 435 K/uL ( @ 11:45)                            Cardiac markers             Troponin I, Serum: <0.015 ng/mL (:45)                             Chems        Sodium, Serum: 141 mmol/L (:04)  Sodium, Serum: 138 mmol/L (:45)          Potassium, Serum: 3.7 mmol/L (:)  Potassium, Serum: 3.2 mmol/L (:45)          Blood Urea Nitrogen, Serum: 10 mg/dL (:04)  Blood Urea Nitrogen, Serum: 7 mg/dL (:45)          Creatinine 0.44  Creatinine 0.68          Magnesium, Serum: 1.2 mg/dL (:04)          Protein Total, Serum: 6.6 gm/dL (:04)  Protein Total, Serum: 7.2 gm/dL ( 11:45)                  Calcium, Total Serum: 7.4 mg/dL (:04)  Calcium, Total Serum: 7.8 mg/dL ( 11:45)          Phosphorus Level, Serum: 2.9 mg/dL (:04)          Bilirubin Total, Serum: 0.5 mg/dL (:04)  Bilirubin Total, Serum: 0.6 mg/dL ( 11:45)          Alanine Aminotransferase (ALT/SGPT): 22 U/L (:04)  Alanine Aminotransferase (ALT/SGPT): 26 U/L ( @ 11:45)          Aspartate Aminotransferase (AST/SGOT): 12 U/L ( 06:04)  Aspartate Aminotransferase (AST/SGOT): 13 U/L ( 11:45)    Rapid RVP Result: NotDetec: This Respiratory Panel uses polymerase chain reaction (PCR) to detect for  adenovirus; coronavirus (HKU1, NL63, 229E, OC43); human metapneumovirus  (hMPV); human enterovirus/rhinovirus (Entero/RV); influenza A; influenza  A/H1; influenza A/H3; influenza A/H1-2009; influenza B; parainfluenza  viruses 1, 2, 3, 4; respiratory syncytial virus; Mycoplasma pneumoniae;  and Chlamydophila pneumoniae. (18 @ 15:13)                   INR: 1.96 ratio ( @ 06:04)  INR: 2.59 ratio ( @ 11:45)         RADIOLOGY:  < from: CT Chest No Cont (18 @ 21:21) >  IMPRESSION:   1. Since the prior study of 18, there has been the development of   groundglass nodular opacities throughout the right upper lobe with   ill-defined   groundglass opacities right middle lobe and to some degree right lower   lobe.   Some haziness noted possibly due to mild interstitial edema versus motion   artifact. Subpleural emphysematous changes posterior right lower lobe are   unchanged.   Left lung demonstrates mild haziness likely due to motion artifact and/or   small   amount of interstitial edema. Minimal atelectasis lingula portion left   lung.   Appearance of left lung grossly unchanged.  2. As on the prior study 18, nodule is noted measuring 1.5 cm   extending   posteriorly from right lobe of thyroid gland with substernal extension   similar   to prior study.   3.Remainder of the study is nonacute and stable.      < end of copied text >      EKG:  Atrial fibrillation with a rapid VR    Telemetry: Atrial fibrillation with a controlled VR    ECHO:
Patient is a 68y old  Female who presents with a chief complaint of c/o fever and SOB     HPI:  67 y/o Female with h/o HTN, HLD, CHF HFpEF, A.fib on Coumadin, morbid Obesity, chronic venous insufficiency, recurrent legs cellulitis, DM2 was admitted on 12/30 for fever and SOB x 7 days. Pt reported that she has been having fever to 99-101F at home x 7 days  and exertional dyspnea for the past 2-3 days. Pt reported that she was seen at Dr. Mayer office and was prescribed Doxycycline PO for the past 6 days PTA with no significant improvement. On arrival to the ED, she had fever to 101.1F, tachycardiac Afib with RVR. She received levofloxacin.      PMH: as above  PSH: as above  Meds: per reconciliation sheet, noted below  MEDICATIONS  (STANDING):  ascorbic acid 500 milliGRAM(s) Oral daily  aspirin enteric coated 81 milliGRAM(s) Oral daily  atorvastatin 10 milliGRAM(s) Oral at bedtime  dextrose 5%. 1000 milliLiter(s) (50 mL/Hr) IV Continuous <Continuous>  dextrose 50% Injectable 12.5 Gram(s) IV Push once  dextrose 50% Injectable 25 Gram(s) IV Push once  dextrose 50% Injectable 25 Gram(s) IV Push once  diltiazem    Tablet 120 milliGRAM(s) Oral two times a day  ferrous    sulfate 325 milliGRAM(s) Oral daily  gabapentin 300 milliGRAM(s) Oral at bedtime  insulin lispro (HumaLOG) corrective regimen sliding scale   SubCutaneous three times a day before meals  levoFLOXacin IVPB 750 milliGRAM(s) IV Intermittent every 24 hours  metoprolol succinate  milliGRAM(s) Oral daily  montelukast 10 milliGRAM(s) Oral at bedtime  potassium chloride    Tablet ER 20 milliEquivalent(s) Oral two times a day    MEDICATIONS  (PRN):  acetaminophen   Tablet .. 650 milliGRAM(s) Oral every 6 hours PRN Temp greater or equal to 38C (100.4F), Mild Pain (1 - 3)  dextrose 40% Gel 15 Gram(s) Oral once PRN Blood Glucose LESS THAN 70 milliGRAM(s)/deciliter  glucagon  Injectable 1 milliGRAM(s) IntraMuscular once PRN Glucose LESS THAN 70 milligrams/deciliter    Allergies    penicillin (Hives); tolerated cefepime in the past    Intolerances      Social: prior smoking, no alcohol, no illegal drugs; no recent travel, no exposure to TB  FAMILY HISTORY:  Family history of diabetes mellitus in mother (Mother)  Family history of breast cancer in mother (Mother)    no history of premature cardiovascular disease in first degree relatives    ROS: the patient denies HA, no seizures, no dizziness, no sore throat, no nasal congestion, no blurry vision, no CP, no palpitations, has SOB, no cough, no abdominal pain, no diarrhea, no N/V, no dysuria, no leg pain, no claudication, no rash, no joint aches, no rectal pain or bleeding, no night sweats  All other systems reviewed and are negative    Vital Signs Last 24 Hrs  T(C): 36.6 (31 Dec 2018 09:28), Max: 36.8 (30 Dec 2018 23:00)  T(F): 97.9 (31 Dec 2018 09:28), Max: 98.2 (30 Dec 2018 23:00)  HR: 72 (31 Dec 2018 12:00) (68 - 96)  BP: 112/47 (31 Dec 2018 12:00) (102/60 - 128/54)  BP(mean): 61 (31 Dec 2018 12:00) (61 - 89)  RR: 22 (31 Dec 2018 12:00) (15 - 24)  SpO2: 97% (31 Dec 2018 12:00) (93% - 98%)  Daily     Daily     PE:    Constitutional: frail looking  HEENT: NC/AT, EOMI, PERRLA, conjunctivae clear; ears and nose atraumatic; pharynx benign  Neck: supple; thyroid not palpable  Back: no tenderness  Respiratory: respiratory effort normal; decreased BS at bases  Cardiovascular: S1S2 regular, no murmurs  Abdomen: soft, not tender, not distended, positive BS; no liver or spleen organomegaly  Genitourinary: no suprapubic tenderness  Lymphatic: no LN palpable  Musculoskeletal: no muscle tenderness, no joint swelling or tenderness  Extremities: no pedal edema  Neurological/ Psychiatric: AxOx3, judgement and insight normal; moving all extremities  Skin: no rashes; no palpable lesions    Labs: all available labs reviewed                        10.7   9.11  )-----------( 416      ( 31 Dec 2018 06:04 )             32.4     12-31    141  |  106  |  10  ----------------------------<  122<H>  3.7   |  26  |  0.44<L>    Ca    7.4<L>      31 Dec 2018 06:04  Phos  2.9     12-31  Mg     1.2     12-31    TPro  6.6  /  Alb  2.0<L>  /  TBili  0.5  /  DBili  x   /  AST  12<L>  /  ALT  22  /  AlkPhos  70  12-31     LIVER FUNCTIONS - ( 31 Dec 2018 06:04 )  Alb: 2.0 g/dL / Pro: 6.6 gm/dL / ALK PHOS: 70 U/L / ALT: 22 U/L / AST: 12 U/L / GGT: x               Radiology: all available radiological tests reviewed    < from: CT Chest No Cont (12.30.18 @ 21:21) >  Several groundglass opacities identified within the right   upper lobe, likely reflecting pneumonia given the clinical context. Rest   of the chronic findings as above.    < end of copied text >      Advanced directives addressed: full resuscitation
Patient is a 68y old  Female who presents with a chief complaint of c/o fever and SOB (31 Dec 2018 13:01)    HPI:  67 yo F with PMH significant for HTN, HLD, CHF HFpEF, Afib on Coumadin, Morbid Obesity, Chronic venous insufficiency,  DM2 presents to the ED c/o fever and SOB x 7 days.     Pt reported that she has been having fever 99-101F at home x 7 days  and exertional dyspnea for the past 2-3 days. Pt reported that she was seen at Dr. Mayer office and was prescribed Doxycycline PO which Pt has been taking for the past 6 days with no significant improvement so came to the ED for eval. On arrival to the ED, Fever 101.1F, Tachycardiac Afib with RVR HR: 140's on arrival , now 's. ON labs, Lactate: 3.1, Pt received IVf Ns 500 cc x 1 in the ED. RVP panel negative. c/o mild dysuria.  Denies headache dizziness, chest pain, palpitation or SOB at rest. Denies cough, diarrhea, abdominal pain, N/V. Denies recent travel. Former smoker, quit about 30 + yrs ago.     Family h/o: Pt reported that Mother had hx of Breast Ca  in her 80's.   Social h/o: Former smoker, quit about 30 + yrs ago, Denies drinking alcohol. (30 Dec 2018 19:55)    2018-  Pt reports feeling much better.  Reports dysphagia and feeling as though her doxycycline was getting stuck in her throat.   Reports now improving and able to swallow without any difficulty.   Last EGD was in  with Dr. Hancock, noting non-bleeding gastric ulcers.   Denies any abdominal pain, n/v.     PAST MEDICAL & SURGICAL HISTORY:  HTN (hypertension)  Thyroid nodule  Peripheral venous insufficiency  Neuropathy  Morbid obesity with BMI of 40.0-44.9, adult  Dyspnea  Congestive heart failure  Atrial fibrillation  Diabetes mellitus type II, controlled  Status post medial meniscus repair  S/P left knee arthroscopy  Other complications of gastric band procedure  H/O colonoscopy  History of esophagogastroduodenoscopy (EGD)    MEDICATIONS  (STANDING):  ascorbic acid 500 milliGRAM(s) Oral daily  aspirin enteric coated 81 milliGRAM(s) Oral daily  atorvastatin 10 milliGRAM(s) Oral at bedtime  cefepime   IVPB 2000 milliGRAM(s) IV Intermittent every 12 hours  dextrose 5%. 1000 milliLiter(s) (50 mL/Hr) IV Continuous <Continuous>  dextrose 50% Injectable 12.5 Gram(s) IV Push once  dextrose 50% Injectable 25 Gram(s) IV Push once  dextrose 50% Injectable 25 Gram(s) IV Push once  diltiazem    Tablet 120 milliGRAM(s) Oral two times a day  ferrous    sulfate 325 milliGRAM(s) Oral daily  gabapentin 300 milliGRAM(s) Oral at bedtime  insulin lispro (HumaLOG) corrective regimen sliding scale   SubCutaneous three times a day before meals  metoprolol succinate  milliGRAM(s) Oral daily  montelukast 10 milliGRAM(s) Oral at bedtime  pantoprazole    Tablet 40 milliGRAM(s) Oral before breakfast  potassium chloride    Tablet ER 20 milliEquivalent(s) Oral two times a day  sucralfate 1 Gram(s) Oral four times a day    MEDICATIONS  (PRN):  acetaminophen   Tablet .. 650 milliGRAM(s) Oral every 6 hours PRN Temp greater or equal to 38C (100.4F), Mild Pain (1 - 3)  dextrose 40% Gel 15 Gram(s) Oral once PRN Blood Glucose LESS THAN 70 milliGRAM(s)/deciliter  glucagon  Injectable 1 milliGRAM(s) IntraMuscular once PRN Glucose LESS THAN 70 milligrams/deciliter    Allergies  penicillin (Hives)    FAMILY HISTORY:  Family history of diabetes mellitus in mother (Mother)  Family history of breast cancer in mother (Mother)    REVIEW OF SYSTEMS:  CONSTITUTIONAL: No weakness, fevers or chills  RESPIRATORY: No cough, wheezing, hemoptysis; No shortness of breath  CARDIOVASCULAR: No chest pain or palpitations  GASTROINTESTINAL: Per HPI.   GENITOURINARY: No dysuria, frequency or hematuria  NEUROLOGICAL: No numbness or weakness  SKIN: No itching, burning, rashes, or lesions   PSYCH: Normal mood and affect  All other review of systems is negative unless indicated above.    Vital Signs Last 24 Hrs  T(C): 37.2 (31 Dec 2018 14:32), Max: 37.2 (31 Dec 2018 14:32)  T(F): 99 (31 Dec 2018 14:32), Max: 99 (31 Dec 2018 14:32)  HR: 86 (31 Dec 2018 15:00) (68 - 96)  BP: 110/57 (31 Dec 2018 14:39) (88/50 - 128/54)  BP(mean): 69 (31 Dec 2018 14:39) (58 - 89)  RR: 24 (31 Dec 2018 15:00) (15 - 25)  SpO2: 97% (31 Dec 2018 15:00) (93% - 98%)    PHYSICAL EXAM:  Constitutional: Middle aged female in NAD, obese.   Respiratory: Mild right sided rhonchi.   Cardiovascular: S1 and S2, RRR, no M/G/R  Gastrointestinal: BS+, soft, NT/ND  Extremities: No peripheral edema  Vascular: venous statis ulcers.   Neurological: A/O x 3, no focal deficits  Psychiatric: Normal mood, normal affect  Skin: B/L lower extremities wrapped.     LABS:                        10.7   9.11  )-----------( 416      ( 31 Dec 2018 06:04 )             32.4     12-    141  |  106  |  10  ----------------------------<  122<H>  3.7   |  26  |  0.44<L>    Ca    7.4<L>      31 Dec 2018 06:04  Phos  2.9     12-  Mg     1.2     12-    TPro  6.6  /  Alb  2.0<L>  /  TBili  0.5  /  DBili  x   /  AST  12<L>  /  ALT  22  /  AlkPhos  70  12-31    PT/INR - ( 31 Dec 2018 06:04 )   PT: 22.2 sec;   INR: 1.96 ratio         PTT - ( 31 Dec 2018 06:04 )  PTT:34.5 sec  LIVER FUNCTIONS - ( 31 Dec 2018 06:04 )  Alb: 2.0 g/dL / Pro: 6.6 gm/dL / ALK PHOS: 70 U/L / ALT: 22 U/L / AST: 12 U/L / GGT: x             RADIOLOGY & ADDITIONAL STUDIES:

## 2018-12-31 NOTE — CONSULT NOTE ADULT - ASSESSMENT
Pt is a 68 year old female w/ venous stasis ulcers for wound care dressing changes    -Weekly dressing change while inpt   -Wound care with silvadene, collagenase and compression dressing  -No other acute vascular surgery intervention indicated at this time  -Continue primary care per medical team    Discussed plan with vascular attending, Dr. Carlson

## 2019-01-01 LAB
ANION GAP SERPL CALC-SCNC: 8 MMOL/L — SIGNIFICANT CHANGE UP (ref 5–17)
BASOPHILS # BLD AUTO: 0.02 K/UL — SIGNIFICANT CHANGE UP (ref 0–0.2)
BASOPHILS NFR BLD AUTO: 0.2 % — SIGNIFICANT CHANGE UP (ref 0–2)
BUN SERPL-MCNC: 9 MG/DL — SIGNIFICANT CHANGE UP (ref 7–23)
CALCIUM SERPL-MCNC: 8.2 MG/DL — LOW (ref 8.5–10.1)
CHLORIDE SERPL-SCNC: 106 MMOL/L — SIGNIFICANT CHANGE UP (ref 96–108)
CO2 SERPL-SCNC: 26 MMOL/L — SIGNIFICANT CHANGE UP (ref 22–31)
CREAT SERPL-MCNC: 0.35 MG/DL — LOW (ref 0.5–1.3)
EOSINOPHIL # BLD AUTO: 0.03 K/UL — SIGNIFICANT CHANGE UP (ref 0–0.5)
EOSINOPHIL NFR BLD AUTO: 0.3 % — SIGNIFICANT CHANGE UP (ref 0–6)
GLUCOSE BLDC GLUCOMTR-MCNC: 131 MG/DL — HIGH (ref 70–99)
GLUCOSE BLDC GLUCOMTR-MCNC: 135 MG/DL — HIGH (ref 70–99)
GLUCOSE BLDC GLUCOMTR-MCNC: 153 MG/DL — HIGH (ref 70–99)
GLUCOSE BLDC GLUCOMTR-MCNC: 259 MG/DL — HIGH (ref 70–99)
GLUCOSE BLDC GLUCOMTR-MCNC: 265 MG/DL — HIGH (ref 70–99)
GLUCOSE SERPL-MCNC: 147 MG/DL — HIGH (ref 70–99)
HCT VFR BLD CALC: 34.5 % — SIGNIFICANT CHANGE UP (ref 34.5–45)
HGB BLD-MCNC: 11 G/DL — LOW (ref 11.5–15.5)
IMM GRANULOCYTES NFR BLD AUTO: 1.2 % — SIGNIFICANT CHANGE UP (ref 0–1.5)
INR BLD: 2.12 RATIO — HIGH (ref 0.88–1.16)
LYMPHOCYTES # BLD AUTO: 0.78 K/UL — LOW (ref 1–3.3)
LYMPHOCYTES # BLD AUTO: 8 % — LOW (ref 13–44)
MAGNESIUM SERPL-MCNC: 1.9 MG/DL — SIGNIFICANT CHANGE UP (ref 1.6–2.6)
MCHC RBC-ENTMCNC: 30.1 PG — SIGNIFICANT CHANGE UP (ref 27–34)
MCHC RBC-ENTMCNC: 31.9 GM/DL — LOW (ref 32–36)
MCV RBC AUTO: 94.5 FL — SIGNIFICANT CHANGE UP (ref 80–100)
MONOCYTES # BLD AUTO: 0.59 K/UL — SIGNIFICANT CHANGE UP (ref 0–0.9)
MONOCYTES NFR BLD AUTO: 6 % — SIGNIFICANT CHANGE UP (ref 2–14)
NEUTROPHILS # BLD AUTO: 8.25 K/UL — HIGH (ref 1.8–7.4)
NEUTROPHILS NFR BLD AUTO: 84.3 % — HIGH (ref 43–77)
NRBC # BLD: 0 /100 WBCS — SIGNIFICANT CHANGE UP (ref 0–0)
PHOSPHATE SERPL-MCNC: 2.6 MG/DL — SIGNIFICANT CHANGE UP (ref 2.5–4.5)
PLATELET # BLD AUTO: 455 K/UL — HIGH (ref 150–400)
POTASSIUM SERPL-MCNC: 4 MMOL/L — SIGNIFICANT CHANGE UP (ref 3.5–5.3)
POTASSIUM SERPL-SCNC: 4 MMOL/L — SIGNIFICANT CHANGE UP (ref 3.5–5.3)
PROTHROM AB SERPL-ACNC: 24.1 SEC — HIGH (ref 10–12.9)
RBC # BLD: 3.65 M/UL — LOW (ref 3.8–5.2)
RBC # FLD: 14 % — SIGNIFICANT CHANGE UP (ref 10.3–14.5)
SODIUM SERPL-SCNC: 140 MMOL/L — SIGNIFICANT CHANGE UP (ref 135–145)
WBC # BLD: 9.79 K/UL — SIGNIFICANT CHANGE UP (ref 3.8–10.5)
WBC # FLD AUTO: 9.79 K/UL — SIGNIFICANT CHANGE UP (ref 3.8–10.5)

## 2019-01-01 PROCEDURE — 93010 ELECTROCARDIOGRAM REPORT: CPT

## 2019-01-01 RX ORDER — INSULIN LISPRO 100/ML
1 VIAL (ML) SUBCUTANEOUS ONCE
Qty: 0 | Refills: 0 | Status: COMPLETED | OUTPATIENT
Start: 2019-01-01 | End: 2019-01-01

## 2019-01-01 RX ORDER — ZOLPIDEM TARTRATE 10 MG/1
5 TABLET ORAL AT BEDTIME
Qty: 0 | Refills: 0 | Status: DISCONTINUED | OUTPATIENT
Start: 2019-01-01 | End: 2019-01-04

## 2019-01-01 RX ORDER — LANOLIN ALCOHOL/MO/W.PET/CERES
5 CREAM (GRAM) TOPICAL AT BEDTIME
Qty: 0 | Refills: 0 | Status: DISCONTINUED | OUTPATIENT
Start: 2019-01-01 | End: 2019-01-04

## 2019-01-01 RX ADMIN — CEFEPIME 100 MILLIGRAM(S): 1 INJECTION, POWDER, FOR SOLUTION INTRAMUSCULAR; INTRAVENOUS at 17:45

## 2019-01-01 RX ADMIN — Medication 100 MILLIGRAM(S): at 06:26

## 2019-01-01 RX ADMIN — PANTOPRAZOLE SODIUM 40 MILLIGRAM(S): 20 TABLET, DELAYED RELEASE ORAL at 06:25

## 2019-01-01 RX ADMIN — Medication 1 GRAM(S): at 00:40

## 2019-01-01 RX ADMIN — Medication 1: at 12:10

## 2019-01-01 RX ADMIN — Medication 500 MILLIGRAM(S): at 12:10

## 2019-01-01 RX ADMIN — MONTELUKAST 10 MILLIGRAM(S): 4 TABLET, CHEWABLE ORAL at 21:24

## 2019-01-01 RX ADMIN — GABAPENTIN 300 MILLIGRAM(S): 400 CAPSULE ORAL at 21:23

## 2019-01-01 RX ADMIN — Medication 1 UNIT(S): at 00:39

## 2019-01-01 RX ADMIN — Medication 325 MILLIGRAM(S): at 12:10

## 2019-01-01 RX ADMIN — Medication 20 MILLIEQUIVALENT(S): at 06:26

## 2019-01-01 RX ADMIN — ATORVASTATIN CALCIUM 10 MILLIGRAM(S): 80 TABLET, FILM COATED ORAL at 21:23

## 2019-01-01 RX ADMIN — Medication 1 GRAM(S): at 12:10

## 2019-01-01 RX ADMIN — CEFEPIME 100 MILLIGRAM(S): 1 INJECTION, POWDER, FOR SOLUTION INTRAMUSCULAR; INTRAVENOUS at 06:25

## 2019-01-01 RX ADMIN — ZOLPIDEM TARTRATE 5 MILLIGRAM(S): 10 TABLET ORAL at 23:02

## 2019-01-01 RX ADMIN — Medication 9 MILLIGRAM(S): at 12:10

## 2019-01-01 RX ADMIN — WARFARIN SODIUM 5 MILLIGRAM(S): 2.5 TABLET ORAL at 21:23

## 2019-01-01 RX ADMIN — Medication 81 MILLIGRAM(S): at 12:10

## 2019-01-01 RX ADMIN — Medication 5 MILLIGRAM(S): at 00:39

## 2019-01-01 RX ADMIN — Medication 1 GRAM(S): at 17:45

## 2019-01-01 RX ADMIN — Medication 1 GRAM(S): at 06:25

## 2019-01-01 RX ADMIN — Medication 1 GRAM(S): at 21:23

## 2019-01-01 RX ADMIN — Medication 20 MILLIEQUIVALENT(S): at 17:45

## 2019-01-01 NOTE — PROGRESS NOTE ADULT - ASSESSMENT
Assessment:  68 year old female w/ venous stasis ulcers for wound care dressing changes    -Bilateral lower extremity dressings changed  -No other acute vascular surgery intervention indicated at this time  -Continue primary care per medical team    Discussed plan with vascular attending, Dr. Adan Garcia

## 2019-01-01 NOTE — PROGRESS NOTE ADULT - SUBJECTIVE AND OBJECTIVE BOX
c/c: fever/sob.     HPI:  69 yo F with PMH significant for HTN, HLD, CHF HFpEF, Afib on Coumadin, Morbid Obesity, Chronic venous insufficiency,  DM2 presents to the ED c/o fever and SOB x 7 days.  Pt reported that she has been having fever 99-101F at home x 7 days  and exertional dyspnea for the past 2-3 days. Pt reported that she was seen at Dr. Mayer office and was prescribed Doxycycline PO which Pt has been taking for the past 6 days with no significant improvement so came to the ED for eval. On arrival to the ED, Fever 101.1F, Tachycardiac Afib with RVR HR: 140's on arrival , now 's. ON labs, Lactate: 3.1, Pt received IVf Ns 500 cc x 1 in the ED. RVP panel negative. c/o mild dysuria.   She was admitted with rapid a fib and further further workup. Her HR improved with IVF. CT showed RIght sided pna.   Upon further questioning, She does admit to difficulty swallowing liquids and sometimes coughs intermittently with po intake. Seen by GI who felt she had pill esophagitis due to doxycycline.     : pt seen and examined this am. overall feeling better. Still with coronado. no chest pain.    Review of system- All 10 systems reviewed and is as per HPI otherwise negative.     Vital Signs Last 24 Hrs  T(C): 36.6 (2019 09:42), Max: 37.2 (31 Dec 2018 14:32)  T(F): 97.9 (2019 09:42), Max: 99 (31 Dec 2018 14:32)  HR: 65 (2019 11:00) (65 - 95)  BP: 113/42 (2019 11:00) (88/50 - 152/84)  BP(mean): 60 (2019 11:00) (58 - 104)  RR: 18 (2019 11:00) (14 - 25)  SpO2: 96% (2019 08:01) (92% - 99%)    PHYSICAL EXAM:    GENERAL: Comfortable, Obese,  no acute distress  HEAD:  Atraumatic, Normocephalic  EYES: EOMI, PERRLA  HEENT: Moist mucous membranes  NECK: Supple, No JVD  NERVOUS SYSTEM:  Alert & Oriented X3, non focal  CHEST/LUNG: decreased bs b/l  HEART: s1, s2+, irregular.  ABDOMEN: Soft, Nontender, Nondistended; Bowel sounds present  GENITOURINARY- Voiding, no palpable bladder  EXTREMITIES:  No clubbing, cyanosis. B/l LE edema++. RLE in dressing, LLE with erythema over pretibial area which she reports is chronic.  MUSCULOSKELETAL- normal tone.  SKIN-erythema over LLE pretibial area.     LABS:                        11.0   9.79  )-----------( 455      ( 2019 06:09 )             34.5     01-    140  |  106  |  9   ----------------------------<  147<H>  4.0   |  26  |  0.35<L>    Ca    8.2<L>      2019 06:09  Phos  2.6       Mg     1.9         TPro  6.6  /  Alb  2.0<L>  /  TBili  0.5  /  DBili  x   /  AST  12<L>  /  ALT  22  /  AlkPhos  70  12-31    PT/INR - ( 2019 06:09 )   PT: 24.1 sec;   INR: 2.12 ratio         PTT - ( 31 Dec 2018 06:04 )  PTT:34.5 sec  Urinalysis Basic - ( 30 Dec 2018 16:56 )    Color: Yellow / Appearance: Clear / S.020 / pH: x  Gluc: x / Ketone: Negative  / Bili: Negative / Urobili: 1 mg/dL   Blood: x / Protein: 15 mg/dL / Nitrite: Negative   Leuk Esterase: Trace / RBC: 0-2 /HPF / WBC 6-10   Sq Epi: x / Non Sq Epi: Few / Bacteria: Few        MEDS  acetaminophen   Tablet .. 650 milliGRAM(s) Oral every 6 hours PRN  ascorbic acid 500 milliGRAM(s) Oral daily  aspirin enteric coated 81 milliGRAM(s) Oral daily  atorvastatin 10 milliGRAM(s) Oral at bedtime  cefepime   IVPB 2000 milliGRAM(s) IV Intermittent every 12 hours  dextrose 40% Gel 15 Gram(s) Oral once PRN  dextrose 5%. 1000 milliLiter(s) IV Continuous <Continuous>  dextrose 50% Injectable 12.5 Gram(s) IV Push once  dextrose 50% Injectable 25 Gram(s) IV Push once  dextrose 50% Injectable 25 Gram(s) IV Push once  diltiazem    Tablet 120 milliGRAM(s) Oral two times a day  ferrous    sulfate 325 milliGRAM(s) Oral daily  gabapentin 300 milliGRAM(s) Oral at bedtime  glucagon  Injectable 1 milliGRAM(s) IntraMuscular once PRN  insulin lispro (HumaLOG) corrective regimen sliding scale   SubCutaneous three times a day before meals  metoprolol succinate  milliGRAM(s) Oral daily  montelukast 10 milliGRAM(s) Oral at bedtime  pantoprazole    Tablet 40 milliGRAM(s) Oral before breakfast  potassium chloride    Tablet ER 20 milliEquivalent(s) Oral two times a day  sucralfate 1 Gram(s) Oral four times a day    ASSESSMENT AND PLAN:  69 yo F with PMH significant for HTN, HLD, CHF HFpEF, Afib on Coumadin Morbid Obesity, Chronic venous insufficiency DM2 presents to the ED c/o fever and SOB x 7 days.    1. Sepsis due to PNA:  -s/p ivf   -ID eval appreciated  -on cefepime d#2  -blood cx neg to date.    2. SOB/CORONADO  -?due to pna  -repeat echo with no pericardial effusion   -INR therapeutic on coumadin on admission, so unlikely PE.   -O2 sats stable  -monitor    3. Dysphagia:  -GI eval appreciated  -likely pill esophagitis  -PPI+carafate.  -outpt f/u    4. Afib with RVR on arrival to the ED  -HR better with ivf  -continue ccb/bb  -dose coumadin to goal inr 2-3.    5. Chronic diastolic CHF:  -Diuretics on hold for now given intravascular depletion.   -to resume as outpt per cardiology    6. RLE ulcer due to venous stasic.  -Weekly dressing change while inpt. Wound care with silvadene, collagenase and compression dressing per vascular    7. DMII:  -hold OHA  -SS  -sugars acceptable    8.  Hx of UC  -on budesonide    9. HTN:  -stable on bb, ccb.    10. HLD:  -statin    11. DVT px.     12: dispo:  PT eval  dc planning likely in next 24- 48 hours if stable.

## 2019-01-01 NOTE — PROGRESS NOTE ADULT - SUBJECTIVE AND OBJECTIVE BOX
CHIEF COMPLAINT: Patient is a 68y old  Female who presents with a chief complaint of c/o fever and SOB (30 Dec 2018 19:55)      HPI: HPI:  67 yo F with PMH significant for HTN, HLD, CHF HFpEF, Afib on Coumadin, Morbid Obesity, Chronic venous insufficiency,  DM2 presents to the ED c/o fever and SOB x 7 days.     Pt reported that she has been having fever 99-101F at home x 7 days  and exertional dyspnea for the past 2-3 days. Pt reported that she was seen at Dr. Mayer office and was prescribed Doxycycline PO which Pt has been taking for the past 6 days with no significant improvement so came to the ED for eval. On arrival to the ED, Fever 101.1F, Tachycardiac Afib with RVR HR: 140's on arrival , now 's. ON labs, Lactate: 3.1, Pt received IVf Ns 500 cc x 1 in the ED. RVP panel negative. c/o mild dysuria.  Denies headache dizziness, chest pain, palpitation or SOB at rest. Denies cough, diarrhea, abdominal pain, N/V. Denies recent travel. Former smoker, quit about 30 + yrs ago.     Family h/o: Pt reported that Mother had hx of Breast Ca  in her 80's.   Social h/o: Former smoker, quit about 30 + yrs ago, Denies drinking alcohol. (30 Dec 2018 19:55)    2019:  Patient remains in the SD      PMHx: PAST MEDICAL & SURGICAL HISTORY:  HTN (hypertension)  Thyroid nodule  Peripheral venous insufficiency  Neuropathy  Morbid obesity with BMI of 40.0-44.9, adult  Dyspnea  Congestive heart failure/HFpEF  Atrial fibrillation  Diabetes mellitus type II, controlled  Status post medial meniscus repair  S/P left knee arthroscopy  Other complications of gastric band procedure  H/O colonoscopy  History of esophagogastroduodenoscopy (EGD)        Soc Hx:     FAMILY HISTORY:  Family history of diabetes mellitus in mother (Mother)  Family history of breast cancer in mother (Mother)      Allergies: Allergies    penicillin (Hives)    Intolerances          REVIEW OF SYSTEMS:    As above  No chest pain + shortness of breath  No lightheadedness or syncope  No leg swelling  No palpitations  No claudication-like symptoms    ICU Vital Signs Last 24 Hrs  T(C): 36.6 (2019 09:42), Max: 37.2 (31 Dec 2018 14:32)  T(F): 97.9 (2019 09:42), Max: 99 (31 Dec 2018 14:32)  HR: 74 (2019 09:01) (69 - 95)  BP: 135/94 (2019 09:01) (88/50 - 152/84)  BP(mean): 104 (2019 09:01) (58 - 104)  ABP: --  ABP(mean): --  RR: 17 (2019 09:01) (14 - 25)  SpO2: 96% (2019 08:01) (92% - 99%)      I&O's Summary    30 Dec 2018 07:01  -  31 Dec 2018 07:00  --------------------------------------------------------  IN: 620 mL / OUT: 0 mL / NET: 620 mL        CAPILLARY BLOOD GLUCOSE      POCT Blood Glucose.: 161 mg/dL (30 Dec 2018 23:57)      PHYSICAL EXAM:   Patient in NAD  Neck: No JVD; Carotids:  2+ without bruits  Respiratory:  Clear to A&P  Cardiovascular: S1 and S2, irregular rate and rhythm, no Murmurs, gallops or rubs  Gastrointestinal:  Soft, non-tender; BS positive  Chronic bilateral LE edema/venous stasis changes  Neurological: A/O x 3, no focal deficits      MEDICATIONS  (STANDING):  ascorbic acid 500 milliGRAM(s) Oral daily  aspirin enteric coated 81 milliGRAM(s) Oral daily  atorvastatin 10 milliGRAM(s) Oral at bedtime  buDESOnide    EC Capsule 9 milliGRAM(s) Oral daily  cefepime   IVPB 2000 milliGRAM(s) IV Intermittent every 12 hours  dextrose 5%. 1000 milliLiter(s) (50 mL/Hr) IV Continuous <Continuous>  dextrose 50% Injectable 12.5 Gram(s) IV Push once  dextrose 50% Injectable 25 Gram(s) IV Push once  dextrose 50% Injectable 25 Gram(s) IV Push once  diltiazem    Tablet 120 milliGRAM(s) Oral two times a day  ferrous    sulfate 325 milliGRAM(s) Oral daily  gabapentin 300 milliGRAM(s) Oral at bedtime  insulin lispro (HumaLOG) corrective regimen sliding scale   SubCutaneous three times a day before meals  metoprolol succinate  milliGRAM(s) Oral daily  montelukast 10 milliGRAM(s) Oral at bedtime  pantoprazole    Tablet 40 milliGRAM(s) Oral before breakfast  potassium chloride    Tablet ER 20 milliEquivalent(s) Oral two times a day  sucralfate 1 Gram(s) Oral four times a day  warfarin 5 milliGRAM(s) Oral daily      Home Medications:  ascorbic acid 500 mg oral tablet: 1 tab(s) orally once a day (30 Dec 2018 20:57)  aspirin 81 mg oral tablet: 1 tab(s) orally once a day (30 Dec 2018 20:57)  Coumadin 5 mg oral tablet: 1 tab(s) orally once a day (30 Dec 2018 20:57)  dilTIAZem 120 mg oral tablet: 1 tab(s) orally 2 times a day (30 Dec 2018 20:57)  docusate sodium 100 mg oral capsule: 1 cap(s) orally once a day, As Needed (30 Dec 2018 20:57)  ferrous sulfate 250 mg oral capsule, extended release: 1 cap(s) orally once a day (30 Dec 2018 20:57)  freetext medication  -: 9 milligram(s) orally once a day (30 Dec 2018 20:57)  gabapentin 300 mg oral capsule: 1 cap(s) orally once a day (at bedtime) (30 Dec 2018 20:57)  metFORMIN 1000 mg oral tablet: 1 tab(s) orally 2 times a day (30 Dec 2018 20:57)  Metoprolol Succinate  mg oral tablet, extended release: 1 tab(s) orally once a day (30 Dec 2018 20:57)  montelukast 10 mg oral tablet: 1 tab(s) orally once a day (at bedtime) (30 Dec 2018 20:57)  potassium chloride 20 mEq oral tablet, extended release: 1 tab(s) orally 2 times a day (30 Dec 2018 20:57)  pravastatin 10 mg oral tablet: 1 tab(s) orally once a day (30 Dec 2018 20:57)  silver sulfADIAZINE 1% topical cream: 1 application topically once a day (30 Dec 2018 20:57)  Uceris 9 mg oral tablet, extended release: 1 tab(s) orally once a day (in the morning) (30 Dec 2018 20:57)        LABS: All Labs Reviewed:  Blood Culture:   BNP   CBC             WBC Count: 9.11 K/uL ( @ :04)  WBC Count: 9.94 K/uL ( 11:45)              Hemoglobin: 10.7 g/dL (:04)  Hemoglobin: 11.8 g/dL ( @ 11:45)              Hematocrit: 32.4 % (:)  Hematocrit: 35.8 % (:45)              Mean Cell Volume: 92.0 fl (:04)  Mean Cell Volume: 89.9 fl (:45)              Platelet Count - Automated: 416 K/uL (:04)  Platelet Count - Automated: 435 K/uL (:45)                        11.0   9.79  )-----------( 455      ( 2019 06:09 )             34.5                               Cardiac markers             Troponin I, Serum: <0.015 ng/mL (:45)                             Chems        Sodium, Serum: 141 mmol/L ( 06:04)  Sodium, Serum: 138 mmol/L ( 11:45)          Potassium, Serum: 3.7 mmol/L (:04)  Potassium, Serum: 3.2 mmol/L ( 11:45)          Blood Urea Nitrogen, Serum: 10 mg/dL (:04)  Blood Urea Nitrogen, Serum: 7 mg/dL (:45)          Creatinine 0.44  Creatinine 0.68          Magnesium, Serum: 1.2 mg/dL ( 06:04)          Protein Total, Serum: 6.6 gm/dL ( 06:04)  Protein Total, Serum: 7.2 gm/dL ( 11:45)                  Calcium, Total Serum: 7.4 mg/dL ( 06:04)  Calcium, Total Serum: 7.8 mg/dL ( 11:45)          Phosphorus Level, Serum: 2.9 mg/dL ( 06:04)          Bilirubin Total, Serum: 0.5 mg/dL ( 06:04)  Bilirubin Total, Serum: 0.6 mg/dL ( @ 11:45)          Alanine Aminotransferase (ALT/SGPT): 22 U/L ( @ 06:04)  Alanine Aminotransferase (ALT/SGPT): 26 U/L ( @ 11:45)          Aspartate Aminotransferase (AST/SGOT): 12 U/L ( @ 06:04)  Aspartate Aminotransferase (AST/SGOT): 13 U/L ( @ 11:45)        140  |  106  |  9   ----------------------------<  147<H>  4.0   |  26  |  0.35<L>    Ca    8.2<L>      2019 06:09  Phos  2.6       Mg     1.9         TPro  6.6  /  Alb  2.0<L>  /  TBili  0.5  /  DBili  x   /  AST  12<L>  /  ALT  22  /  AlkPhos  70        Rapid RVP Result: NotDete: This Respiratory Panel uses polymerase chain reaction (PCR) to detect for  adenovirus; coronavirus (HKU1, NL63, 229E, OC43); human metapneumovirus  (hMPV); human enterovirus/rhinovirus (Entero/RV); influenza A; influenza  A/H1; influenza A/H3; influenza A/H1-2009; influenza B; parainfluenza  viruses 1, 2, 3, 4; respiratory syncytial virus; Mycoplasma pneumoniae;  and Chlamydophila pneumoniae. (18 @ 15:13)                   INR: 1.96 ratio ( @ 06:04)  INR: 2.59 ratio ( @ 11:45)         RADIOLOGY:  < from: CT Chest No Cont (18 @ 21:21) >  IMPRESSION:   1. Since the prior study of 18, there has been the development of   groundglass nodular opacities throughout the right upper lobe with   ill-defined   groundglass opacities right middle lobe and to some degree right lower   lobe.   Some haziness noted possibly due to mild interstitial edema versus motion   artifact. Subpleural emphysematous changes posterior right lower lobe are   unchanged.   Left lung demonstrates mild haziness likely due to motion artifact and/or   small   amount of interstitial edema. Minimal atelectasis lingula portion left   lung.   Appearance of left lung grossly unchanged.  2. As on the prior study 18, nodule is noted measuring 1.5 cm   extending   posteriorly from right lobe of thyroid gland with substernal extension   similar   to prior study.   3.Remainder of the study is nonacute and stable.      < end of copied text >      EKG:  Atrial fibrillation with a rapid VR    Telemetry: Atrial fibrillation with a controlled VR    ECHO:  < from: Transthoracic Echocardiogram Follow Up (18 @ 13:50) >  Summary     Limited study to asses pericardial effusion.   Estimated left ventricular ejection fraction is 55-60 %.   No evidence of pericardial effusion.    < end of copied text >

## 2019-01-01 NOTE — PROGRESS NOTE ADULT - SUBJECTIVE AND OBJECTIVE BOX
Patient seen and examined at bedside. Dressings removed, legs cleansed, new compression dressings applied bilaterally. Vitals reviewed, no acute issues.    Vital Signs Last 24 Hrs  T(C): 36.7 (01 Jan 2019 13:05), Max: 37.2 (31 Dec 2018 14:32)  T(F): 98.1 (01 Jan 2019 13:05), Max: 99 (31 Dec 2018 14:32)  HR: 79 (01 Jan 2019 13:00) (65 - 95)  BP: 97/81 (01 Jan 2019 13:00) (97/81 - 152/84)  BP(mean): 85 (01 Jan 2019 13:00) (60 - 104)  RR: 26 (01 Jan 2019 13:00) (14 - 26)  SpO2: 95% (01 Jan 2019 12:00) (92% - 99%)    Exam:  General: AAOx3 in NAD  Ext: Bilaterally swollen lower extremities, RLE with venous stasis wounds, compression dressing reapplied. LLE no open wounds.                           11.0   9.79  )-----------( 455      ( 01 Jan 2019 06:09 )             34.5

## 2019-01-02 LAB
ANION GAP SERPL CALC-SCNC: 8 MMOL/L — SIGNIFICANT CHANGE UP (ref 5–17)
BASOPHILS # BLD AUTO: 0.05 K/UL — SIGNIFICANT CHANGE UP (ref 0–0.2)
BASOPHILS NFR BLD AUTO: 0.4 % — SIGNIFICANT CHANGE UP (ref 0–2)
BUN SERPL-MCNC: 13 MG/DL — SIGNIFICANT CHANGE UP (ref 7–23)
CALCIUM SERPL-MCNC: 8.3 MG/DL — LOW (ref 8.5–10.1)
CHLORIDE SERPL-SCNC: 107 MMOL/L — SIGNIFICANT CHANGE UP (ref 96–108)
CO2 SERPL-SCNC: 25 MMOL/L — SIGNIFICANT CHANGE UP (ref 22–31)
CREAT SERPL-MCNC: 0.45 MG/DL — LOW (ref 0.5–1.3)
EOSINOPHIL # BLD AUTO: 0.11 K/UL — SIGNIFICANT CHANGE UP (ref 0–0.5)
EOSINOPHIL NFR BLD AUTO: 0.9 % — SIGNIFICANT CHANGE UP (ref 0–6)
GLUCOSE BLDC GLUCOMTR-MCNC: 120 MG/DL — HIGH (ref 70–99)
GLUCOSE BLDC GLUCOMTR-MCNC: 160 MG/DL — HIGH (ref 70–99)
GLUCOSE BLDC GLUCOMTR-MCNC: 189 MG/DL — HIGH (ref 70–99)
GLUCOSE BLDC GLUCOMTR-MCNC: 204 MG/DL — HIGH (ref 70–99)
GLUCOSE SERPL-MCNC: 151 MG/DL — HIGH (ref 70–99)
HCT VFR BLD CALC: 34.9 % — SIGNIFICANT CHANGE UP (ref 34.5–45)
HGB BLD-MCNC: 11.1 G/DL — LOW (ref 11.5–15.5)
IMM GRANULOCYTES NFR BLD AUTO: 1.4 % — SIGNIFICANT CHANGE UP (ref 0–1.5)
INR BLD: 2.43 RATIO — HIGH (ref 0.88–1.16)
LYMPHOCYTES # BLD AUTO: 0.87 K/UL — LOW (ref 1–3.3)
LYMPHOCYTES # BLD AUTO: 7.4 % — LOW (ref 13–44)
MCHC RBC-ENTMCNC: 29.6 PG — SIGNIFICANT CHANGE UP (ref 27–34)
MCHC RBC-ENTMCNC: 31.8 GM/DL — LOW (ref 32–36)
MCV RBC AUTO: 93.1 FL — SIGNIFICANT CHANGE UP (ref 80–100)
MONOCYTES # BLD AUTO: 0.59 K/UL — SIGNIFICANT CHANGE UP (ref 0–0.9)
MONOCYTES NFR BLD AUTO: 5 % — SIGNIFICANT CHANGE UP (ref 2–14)
NEUTROPHILS # BLD AUTO: 10.03 K/UL — HIGH (ref 1.8–7.4)
NEUTROPHILS NFR BLD AUTO: 84.9 % — HIGH (ref 43–77)
NRBC # BLD: 0 /100 WBCS — SIGNIFICANT CHANGE UP (ref 0–0)
PLATELET # BLD AUTO: 474 K/UL — HIGH (ref 150–400)
POTASSIUM SERPL-MCNC: 4.2 MMOL/L — SIGNIFICANT CHANGE UP (ref 3.5–5.3)
POTASSIUM SERPL-SCNC: 4.2 MMOL/L — SIGNIFICANT CHANGE UP (ref 3.5–5.3)
PROTHROM AB SERPL-ACNC: 27.7 SEC — HIGH (ref 10–12.9)
RBC # BLD: 3.75 M/UL — LOW (ref 3.8–5.2)
RBC # FLD: 14 % — SIGNIFICANT CHANGE UP (ref 10.3–14.5)
SODIUM SERPL-SCNC: 140 MMOL/L — SIGNIFICANT CHANGE UP (ref 135–145)
WBC # BLD: 11.81 K/UL — HIGH (ref 3.8–10.5)
WBC # FLD AUTO: 11.81 K/UL — HIGH (ref 3.8–10.5)

## 2019-01-02 PROCEDURE — 71045 X-RAY EXAM CHEST 1 VIEW: CPT | Mod: 26

## 2019-01-02 PROCEDURE — 93010 ELECTROCARDIOGRAM REPORT: CPT

## 2019-01-02 RX ORDER — FUROSEMIDE 40 MG
40 TABLET ORAL DAILY
Qty: 0 | Refills: 0 | Status: DISCONTINUED | OUTPATIENT
Start: 2019-01-02 | End: 2019-01-04

## 2019-01-02 RX ADMIN — Medication 325 MILLIGRAM(S): at 12:00

## 2019-01-02 RX ADMIN — WARFARIN SODIUM 5 MILLIGRAM(S): 2.5 TABLET ORAL at 21:15

## 2019-01-02 RX ADMIN — ATORVASTATIN CALCIUM 10 MILLIGRAM(S): 80 TABLET, FILM COATED ORAL at 21:15

## 2019-01-02 RX ADMIN — Medication 1 GRAM(S): at 05:24

## 2019-01-02 RX ADMIN — CEFEPIME 100 MILLIGRAM(S): 1 INJECTION, POWDER, FOR SOLUTION INTRAMUSCULAR; INTRAVENOUS at 05:23

## 2019-01-02 RX ADMIN — Medication 81 MILLIGRAM(S): at 11:59

## 2019-01-02 RX ADMIN — Medication 40 MILLIGRAM(S): at 12:00

## 2019-01-02 RX ADMIN — Medication 9 MILLIGRAM(S): at 05:24

## 2019-01-02 RX ADMIN — Medication 1 GRAM(S): at 17:42

## 2019-01-02 RX ADMIN — Medication 1: at 17:42

## 2019-01-02 RX ADMIN — Medication 100 MILLIGRAM(S): at 05:24

## 2019-01-02 RX ADMIN — MONTELUKAST 10 MILLIGRAM(S): 4 TABLET, CHEWABLE ORAL at 21:15

## 2019-01-02 RX ADMIN — Medication 20 MILLIEQUIVALENT(S): at 17:44

## 2019-01-02 RX ADMIN — Medication 1: at 12:05

## 2019-01-02 RX ADMIN — ZOLPIDEM TARTRATE 5 MILLIGRAM(S): 10 TABLET ORAL at 22:17

## 2019-01-02 RX ADMIN — Medication 20 MILLIEQUIVALENT(S): at 05:24

## 2019-01-02 RX ADMIN — Medication 500 MILLIGRAM(S): at 11:59

## 2019-01-02 RX ADMIN — Medication 1 GRAM(S): at 22:16

## 2019-01-02 RX ADMIN — PANTOPRAZOLE SODIUM 40 MILLIGRAM(S): 20 TABLET, DELAYED RELEASE ORAL at 05:24

## 2019-01-02 RX ADMIN — CEFEPIME 100 MILLIGRAM(S): 1 INJECTION, POWDER, FOR SOLUTION INTRAMUSCULAR; INTRAVENOUS at 17:44

## 2019-01-02 RX ADMIN — Medication 1 GRAM(S): at 11:59

## 2019-01-02 RX ADMIN — GABAPENTIN 300 MILLIGRAM(S): 400 CAPSULE ORAL at 21:15

## 2019-01-02 NOTE — PROGRESS NOTE ADULT - ASSESSMENT
69 y/o female admitted with fevers/SOB noting to have RUQ PNA with multiple medical issues.   Now with complaints of dysphagia after being on doxycyline for the past several days.   UC-concerns she is not on budesonide at present time, but prefers Uceris as symptoms are better controlled.  We do not have Uceris in hospital, will start budesonide now-safe to administrate during an infectious state.      Impression:  Likely pill esophagitis 2/2 to recent doxycyline use, with pna/reflux playing a role-doubt esophageal stricture-clinically improving per patient.    Plan:  Tolerating diet with minimal dysphagia.   IV ABX per ID.   Avoid doxycyline as this is likely playing a role.   Continue PPI and carafate.   If patient doesn't continue to improve will plan for esophagram and/or EGD when more stable.   Last EGD was done on 1/24/2018-noting non-bleeding gastric ulcer.   Continue budesonide 9mg PO daily for UC, will attempt to get uceris 9mg PO daily.     Discussed with patient, Dr. Marie.

## 2019-01-02 NOTE — PHYSICAL THERAPY INITIAL EVALUATION ADULT - DIAGNOSIS, PT EVAL
Sepsis secondary to PN, SOB/JETT secondary to PN, dysphagia, A-fib, chronic diastolic CHF, right LE ulcer due to venous stasis.

## 2019-01-02 NOTE — PROGRESS NOTE ADULT - SUBJECTIVE AND OBJECTIVE BOX
c/c: fever/sob.     HPI:  67 yo F with PMH significant for HTN, HLD, CHF HFpEF, Afib on Coumadin, Morbid Obesity, Chronic venous insufficiency,  DM2 presents to the ED c/o fever and SOB x 7 days.  Pt reported that she has been having fever 99-101F at home x 7 days  and exertional dyspnea for the past 2-3 days. Pt reported that she was seen at Dr. Mayer office and was prescribed Doxycycline PO which Pt has been taking for the past 6 days with no significant improvement so came to the ED for eval. On arrival to the ED, Fever 101.1F, Tachycardiac Afib with RVR HR: 140's on arrival , now 's. ON labs, Lactate: 3.1, Pt received IVf Ns 500 cc x 1 in the ED. RVP panel negative. c/o mild dysuria.   She was admitted with rapid a fib and further further workup. Her HR improved with IVF. CT showed RIght sided pna.   Upon further questioning, She does admit to difficulty swallowing liquids and sometimes coughs intermittently with po intake. Seen by GI who felt she had pill esophagitis due to doxycycline.     1/1: pt seen and examined this am. overall feeling better. Still with coronado. no chest pain.  1/2 s/p IV lasix earlier today- feels much better    Review of system- All 10 systems reviewed and is as per HPI otherwise negative.     Vital Signs Last 24 Hrs  T(C): 36.6 (02 Jan 2019 14:37), Max: 36.9 (01 Jan 2019 16:47)  T(F): 97.9 (02 Jan 2019 14:37), Max: 98.5 (01 Jan 2019 16:47)  HR: 54 (02 Jan 2019 11:00) (51 - 89)  BP: 147/77 (02 Jan 2019 11:00) (106/51 - 162/100)  BP(mean): 92 (02 Jan 2019 11:00) (41 - 113)  RR: 16 (02 Jan 2019 11:00) (14 - 24)  SpO2: 98% (02 Jan 2019 11:00) (92% - 100%)    PHYSICAL EXAM:  GENERAL: Comfortable, Obese,  no acute distress  HEAD:  Atraumatic, Normocephalic  EYES: EOMI, PERRLA  HEENT: Moist mucous membranes  NECK: Supple, No JVD  NERVOUS SYSTEM:  Alert & Oriented X3, non focal  CHEST/LUNG: decreased bs b/l, occasional rhonchi  HEART: s1, s2+, irregular.  ABDOMEN: Soft, Nontender, Nondistended; Bowel sounds present  GENITOURINARY- Voiding, no palpable bladder  EXTREMITIES:  No clubbing, cyanosis. B/l LE edema++. RLE in dressing, LLE with erythema over pretibial area which she reports is chronic.  MUSCULOSKELETAL- normal tone.  SKIN-erythema over LLE pretibial area.     LABS:                        11.1   11.81 )-----------( 474      ( 02 Jan 2019 05:52 )             34.9     02 Jan 2019 05:52    140    |  107    |  13     ----------------------------<  151    4.2     |  25     |  0.45     Ca    8.3        02 Jan 2019 05:52  Phos  2.6       01 Jan 2019 06:09  Mg     1.9       01 Jan 2019 06:09    PT/INR - ( 02 Jan 2019 05:52 )   PT: 27.7 sec;   INR: 2.43 ratio      CAPILLARY BLOOD GLUCOSE  POCT Blood Glucose.: 189 mg/dL (02 Jan 2019 12:04)  POCT Blood Glucose.: 120 mg/dL (02 Jan 2019 08:16)  POCT Blood Glucose.: 265 mg/dL (01 Jan 2019 21:34)  POCT Blood Glucose.: 135 mg/dL (01 Jan 2019 17:41)    MEDICATIONS  (STANDING):  ascorbic acid 500 milliGRAM(s) Oral daily  aspirin enteric coated 81 milliGRAM(s) Oral daily  atorvastatin 10 milliGRAM(s) Oral at bedtime  buDESOnide    EC Capsule 9 milliGRAM(s) Oral daily  cefepime   IVPB 2000 milliGRAM(s) IV Intermittent every 12 hours  dextrose 5%. 1000 milliLiter(s) (50 mL/Hr) IV Continuous <Continuous>  dextrose 50% Injectable 12.5 Gram(s) IV Push once  dextrose 50% Injectable 25 Gram(s) IV Push once  dextrose 50% Injectable 25 Gram(s) IV Push once  diltiazem    Tablet 120 milliGRAM(s) Oral two times a day  ferrous    sulfate 325 milliGRAM(s) Oral daily  furosemide   Injectable 40 milliGRAM(s) IV Push daily  gabapentin 300 milliGRAM(s) Oral at bedtime  insulin lispro (HumaLOG) corrective regimen sliding scale   SubCutaneous three times a day before meals  metoprolol succinate  milliGRAM(s) Oral daily  montelukast 10 milliGRAM(s) Oral at bedtime  pantoprazole    Tablet 40 milliGRAM(s) Oral before breakfast  potassium chloride    Tablet ER 20 milliEquivalent(s) Oral two times a day  sucralfate 1 Gram(s) Oral four times a day  warfarin 5 milliGRAM(s) Oral daily    MEDICATIONS  (PRN):  acetaminophen   Tablet .. 650 milliGRAM(s) Oral every 6 hours PRN Temp greater or equal to 38C (100.4F), Mild Pain (1 - 3)  dextrose 40% Gel 15 Gram(s) Oral once PRN Blood Glucose LESS THAN 70 milliGRAM(s)/deciliter  glucagon  Injectable 1 milliGRAM(s) IntraMuscular once PRN Glucose LESS THAN 70 milligrams/deciliter  melatonin 5 milliGRAM(s) Oral at bedtime PRN Insomnia  zolpidem 5 milliGRAM(s) Oral at bedtime PRN Insomnia    ASSESSMENT AND PLAN:  #Sepsis due to multifocal PNA, poss GNR  ID f/u appreciated  Cont IV abx  Repeat CXR today    #Acute on chronic diastolic CHF  Cardio f/u appreciated  Improved after IV Lasix    #Dysphagia:  -GI eval appreciated  -likely pill esophagitis  -PPI+carafate.  -outpt f/u    #Afib with RVR on arrival to the ED  -HR better   -continue ccb/bb  -dose coumadin to goal inr 2-3.    #RLE ulcer due to venous stasic.  -Weekly dressing change while inpt. Wound care with silvadene, collagenase and compression dressing per vascular    #DMII:  -hold OHA  -SS  -sugars acceptable    #Hx of UC  -on budesonide    #HTN/hyperlipidemia  Cont current meds    #Dispo- if maintains O2 sats off oxygen can transfer to med-surg. D/w pt and  at bedside

## 2019-01-02 NOTE — PHYSICAL THERAPY INITIAL EVALUATION ADULT - PLANNED THERAPY INTERVENTIONS, PT EVAL
transfer training/bed mobility training/gait training/Stair training. Sharla, sixto, transfers x 15'.

## 2019-01-02 NOTE — PROGRESS NOTE ADULT - ASSESSMENT
68 year old woman with the above history admitted with fever and shortness of breath.  There are new abnormal findings in the right lung on CT of the chest.  Consider pulmonary consult.  She was given IV fluids in the ER with slowing of her atrial fibrillation rate.  Will hold on diuretics at the present time.  Will discuss with hospitalist regarding her current status.  No evidence for ACS    01/01/2019:  Patient appears stable from a cardiac standpoint.  Continue present management.  Resume diuretics as an outpatient.    01/02/2019:  Patient's BP is high today(was previously normal).  Will not add any medications at the present time.  If discharged I will follow up her BP as an outpatient.  Continue metoprolol and diltiazem for now. 68 year old woman with the above history admitted with fever and shortness of breath.  There are new abnormal findings in the right lung on CT of the chest.  Consider pulmonary consult.  She was given IV fluids in the ER with slowing of her atrial fibrillation rate.  Will hold on diuretics at the present time.  Will discuss with hospitalist regarding her current status.  No evidence for ACS    01/01/2019:  Patient appears stable from a cardiac standpoint.  Continue present management.  Resume diuretics as an outpatient.    01/02/2019:  Patient's BP is high today(was previously normal).  Also with sob.  Probable acute on chronic HFpEF and high BP is probably fluid related.  Will restart furosemide.

## 2019-01-02 NOTE — PROGRESS NOTE ADULT - ASSESSMENT
69 y/o Female with h/o HTN, HLD, CHF HFpEF, A.fib on Coumadin, morbid Obesity, chronic venous insufficiency, recurrent legs cellulitis, DM2 was admitted on 12/30 for fever and SOB x 7 days. Pt reported that she has been having fever to 99-101F at home x 7 days  and exertional dyspnea for the past 2-3 days. Pt reported that she was seen at Dr. Mayer office and was prescribed Doxycycline PO for the past 6 days PTA with no significant improvement. On arrival to the ED, she had fever to 101.1F, tachycardiac Afib with RVR. She received levofloxacin.    1. Febrile syndrome resolving. RUL pneumonia. Allergy to PCN.  -leukocytosis  -dysphagia ?related to "pills esophagitis"  -dyspnea is improved  -BC x 2 are negative to date  -on cefepime 2 gm IV q12h # 3  -tolerating abx well so far; no side effects noted  -monitor closely in ej of PCN allergy history  -continue abx coverage  -monitor temps  -f/u CBC  -supportive care  2. Other issues:   -care per medicine

## 2019-01-02 NOTE — PROGRESS NOTE ADULT - SUBJECTIVE AND OBJECTIVE BOX
Patient is a 68y old  Female who presents with a chief complaint of c/o fever and SOB (2019 07:52)    HPI:  67 yo F with PMH significant for HTN, HLD, CHF HFpEF, Afib on Coumadin, Morbid Obesity, Chronic venous insufficiency,  DM2 presents to the ED c/o fever and SOB x 7 days.     Pt reported that she has been having fever 99-101F at home x 7 days  and exertional dyspnea for the past 2-3 days. Pt reported that she was seen at Dr. Mayer office and was prescribed Doxycycline PO which Pt has been taking for the past 6 days with no significant improvement so came to the ED for eval. On arrival to the ED, Fever 101.1F, Tachycardiac Afib with RVR HR: 140's on arrival , now 's. ON labs, Lactate: 3.1, Pt received IVf Ns 500 cc x 1 in the ED. RVP panel negative. c/o mild dysuria.  Denies headache dizziness, chest pain, palpitation or SOB at rest. Denies cough, diarrhea, abdominal pain, N/V. Denies recent travel. Former smoker, quit about 30 + yrs ago.     2018-  Pt reports SOB with exertion.   Loose stools this AM-started on budesonide-prefer uceris.   Pt reports less dysphagia and tolerating diet.   Denies any fevers, N/V.    Family h/o: Pt reported that Mother had hx of Breast Ca  in her 80's.   Social h/o: Former smoker, quit about 30 + yrs ago, Denies drinking alcohol. (30 Dec 2018 19:55)    PAST MEDICAL & SURGICAL HISTORY:  HTN (hypertension)  Thyroid nodule  Peripheral venous insufficiency  Neuropathy  Morbid obesity with BMI of 40.0-44.9, adult  Dyspnea  Congestive heart failure  Atrial fibrillation  Diabetes mellitus type II, controlled  Status post medial meniscus repair  S/P left knee arthroscopy  Other complications of gastric band procedure  H/O colonoscopy  History of esophagogastroduodenoscopy (EGD)    MEDICATIONS  (STANDING):  ascorbic acid 500 milliGRAM(s) Oral daily  aspirin enteric coated 81 milliGRAM(s) Oral daily  atorvastatin 10 milliGRAM(s) Oral at bedtime  buDESOnide    EC Capsule 9 milliGRAM(s) Oral daily  cefepime   IVPB 2000 milliGRAM(s) IV Intermittent every 12 hours  dextrose 5%. 1000 milliLiter(s) (50 mL/Hr) IV Continuous <Continuous>  dextrose 50% Injectable 12.5 Gram(s) IV Push once  dextrose 50% Injectable 25 Gram(s) IV Push once  dextrose 50% Injectable 25 Gram(s) IV Push once  diltiazem    Tablet 120 milliGRAM(s) Oral two times a day  ferrous    sulfate 325 milliGRAM(s) Oral daily  gabapentin 300 milliGRAM(s) Oral at bedtime  insulin lispro (HumaLOG) corrective regimen sliding scale   SubCutaneous three times a day before meals  metoprolol succinate  milliGRAM(s) Oral daily  montelukast 10 milliGRAM(s) Oral at bedtime  pantoprazole    Tablet 40 milliGRAM(s) Oral before breakfast  potassium chloride    Tablet ER 20 milliEquivalent(s) Oral two times a day  sucralfate 1 Gram(s) Oral four times a day  warfarin 5 milliGRAM(s) Oral daily    MEDICATIONS  (PRN):  acetaminophen   Tablet .. 650 milliGRAM(s) Oral every 6 hours PRN Temp greater or equal to 38C (100.4F), Mild Pain (1 - 3)  dextrose 40% Gel 15 Gram(s) Oral once PRN Blood Glucose LESS THAN 70 milliGRAM(s)/deciliter  glucagon  Injectable 1 milliGRAM(s) IntraMuscular once PRN Glucose LESS THAN 70 milligrams/deciliter  melatonin 5 milliGRAM(s) Oral at bedtime PRN Insomnia  zolpidem 5 milliGRAM(s) Oral at bedtime PRN Insomnia    Allergies  penicillin (Hives)    FAMILY HISTORY:  Family history of diabetes mellitus in mother (Mother)  Family history of breast cancer in mother (Mother)    REVIEW OF SYSTEMS:  CONSTITUTIONAL: No weakness, fevers or chills  RESPIRATORY: + dry cough. No wheezing, hemoptysis; + shortness of breath with exertion.   CARDIOVASCULAR: No chest pain or palpitations  GASTROINTESTINAL: Per HPI.     Vital Signs Last 24 Hrs  T(C): 36.7 (2019 06:00), Max: 36.9 (2019 16:47)  T(F): 98.1 (2019 06:00), Max: 98.5 (2019 16:47)  HR: 87 (2019 06:00) (65 - 89)  BP: 162/100 (2019 05:00) (97/81 - 162/100)  BP(mean): 113 (2019 05:00) (41 - 113)  RR: 19 (2019 06:00) (14 - 26)  SpO2: 97% (2019 06:00) (90% - 100%)    PHYSICAL EXAM:  Constitutional: Obese, middle aged female who appears in mild distress while ambulating, multiple medical issues.   Respiratory: Rhonchi noted throughout.   Cardiovascular: S1 and S2, RRR, no M/G/R  Gastrointestinal: BS+, soft, NT/ND    LABS:                        11.1   11.81 )-----------( 474      ( 2019 05:52 )             34.9     01-02    140  |  107  |  13  ----------------------------<  151<H>  4.2   |  25  |  0.45<L>    Ca    8.3<L>      2019 05:52  Phos  2.6     01-  Mg     1.9     01-01      PT/INR - ( 2019 05:52 )   PT: 27.7 sec;   INR: 2.43 ratio               RADIOLOGY & ADDITIONAL STUDIES:

## 2019-01-02 NOTE — PROGRESS NOTE ADULT - SUBJECTIVE AND OBJECTIVE BOX
Patient seen and examined at bedside. Dressings changed bilaterally yesterday. Vitals reviewed, no acute issues.    Vital Signs Last 24 Hrs  T(C): 36.7 (02 Jan 2019 06:00), Max: 36.9 (01 Jan 2019 16:47)  T(F): 98.1 (02 Jan 2019 06:00), Max: 98.5 (01 Jan 2019 16:47)  HR: 78 (02 Jan 2019 09:00) (65 - 89)  BP: 151/77 (02 Jan 2019 07:00) (97/81 - 162/100)  BP(mean): 96 (02 Jan 2019 07:00) (41 - 113)  RR: 22 (02 Jan 2019 09:00) (14 - 26)  SpO2: 99% (02 Jan 2019 09:00) (90% - 100%)    Exam:  General: AAOx3 in NAD  Ext: Bilaterally swollen lower extremities, RLE with venous stasis wounds, compression dressing reapplied. LLE no open wounds.                CBC Full  -  ( 02 Jan 2019 05:52 )  WBC Count : 11.81 K/uL  Hemoglobin : 11.1 g/dL  Hematocrit : 34.9 %  Platelet Count - Automated : 474 K/uL  Mean Cell Volume : 93.1 fl  Mean Cell Hemoglobin : 29.6 pg  Mean Cell Hemoglobin Concentration : 31.8 gm/dL  Auto Neutrophil # : 10.03 K/uL  Auto Lymphocyte # : 0.87 K/uL  Auto Monocyte # : 0.59 K/uL  Auto Eosinophil # : 0.11 K/uL  Auto Basophil # : 0.05 K/uL  Auto Neutrophil % : 84.9 %  Auto Lymphocyte % : 7.4 %  Auto Monocyte % : 5.0 %  Auto Eosinophil % : 0.9 %  Auto Basophil % : 0.4 %

## 2019-01-02 NOTE — PROGRESS NOTE ADULT - ASSESSMENT
Assessment:  68 year old female w/ venous stasis ulcers for wound care dressing changes    -Bilateral lower extremity dressings to be changed  -No other acute vascular surgery intervention indicated at this time  -Continue primary care per medical team    Discussed plan with vascular attending, Dr. Yoo

## 2019-01-02 NOTE — PHYSICAL THERAPY INITIAL EVALUATION ADULT - ACTIVE RANGE OF MOTION EXAMINATION, REHAB EVAL
Bilateral hip flex ~ 90 degrees, and bilateral DF neutral./no Active ROM deficits were identified/deficits as listed below

## 2019-01-02 NOTE — PROGRESS NOTE ADULT - SUBJECTIVE AND OBJECTIVE BOX
CHIEF COMPLAINT: Patient is a 68y old  Female who presents with a chief complaint of c/o fever and SOB (30 Dec 2018 19:55)      HPI: HPI:  67 yo F with PMH significant for HTN, HLD, CHF HFpEF, Afib on Coumadin, Morbid Obesity, Chronic venous insufficiency,  DM2 presents to the ED c/o fever and SOB x 7 days.     Pt reported that she has been having fever 99-101F at home x 7 days  and exertional dyspnea for the past 2-3 days. Pt reported that she was seen at Dr. Mayer office and was prescribed Doxycycline PO which Pt has been taking for the past 6 days with no significant improvement so came to the ED for eval. On arrival to the ED, Fever 101.1F, Tachycardiac Afib with RVR HR: 140's on arrival , now 's. ON labs, Lactate: 3.1, Pt received IVf Ns 500 cc x 1 in the ED. RVP panel negative. c/o mild dysuria.  Denies headache dizziness, chest pain, palpitation or SOB at rest. Denies cough, diarrhea, abdominal pain, N/V. Denies recent travel. Former smoker, quit about 30 + yrs ago.     Family h/o: Pt reported that Mother had hx of Breast Ca  in her 80's.   Social h/o: Former smoker, quit about 30 + yrs ago, Denies drinking alcohol. (30 Dec 2018 19:55)    2019:  Patient remains in the SD  2019:  Patient remains in the SD      PMHx: PAST MEDICAL & SURGICAL HISTORY:  HTN (hypertension)  Thyroid nodule  Peripheral venous insufficiency  Neuropathy  Morbid obesity with BMI of 40.0-44.9, adult  Dyspnea  Congestive heart failure/HFpEF  Atrial fibrillation  Diabetes mellitus type II, controlled  Status post medial meniscus repair  S/P left knee arthroscopy  Other complications of gastric band procedure  H/O colonoscopy  History of esophagogastroduodenoscopy (EGD)        Soc Hx:     FAMILY HISTORY:  Family history of diabetes mellitus in mother (Mother)  Family history of breast cancer in mother (Mother)      Allergies: Allergies    penicillin (Hives)    Intolerances          REVIEW OF SYSTEMS:    As above  No chest pain + shortness of breath  No lightheadedness or syncope  No leg swelling  No palpitations  No claudication-like symptoms    ICU Vital Signs Last 24 Hrs  T(C): 36.7 (2019 06:00), Max: 36.9 (2019 16:47)  T(F): 98.1 (2019 06:00), Max: 98.5 (2019 16:47)  HR: 87 (2019 06:00) (65 - 89)  BP: 162/100 (2019 05:00) (97/81 - 162/100)  BP(mean): 113 (2019 05:00) (41 - 113)  ABP: --  ABP(mean): --  RR: 19 (2019 06:00) (14 - 26)  SpO2: 97% (2019 06:00) (90% - 100%)        I&O's Summary    30 Dec 2018 07:01  -  31 Dec 2018 07:00  --------------------------------------------------------  IN: 620 mL / OUT: 0 mL / NET: 620 mL        CAPILLARY BLOOD GLUCOSE      POCT Blood Glucose.: 161 mg/dL (30 Dec 2018 23:57)      PHYSICAL EXAM:   Patient in NAD  Neck: No JVD; Carotids:  2+ without bruits  Respiratory:  Clear to A&P  Cardiovascular: S1 and S2, irregular rate and rhythm, no Murmurs, gallops or rubs  Gastrointestinal:  Soft, non-tender; BS positive  Chronic bilateral LE edema/venous stasis changes  Neurological: A/O x 3, no focal deficits      MEDICATIONS  (STANDING):  ascorbic acid 500 milliGRAM(s) Oral daily  aspirin enteric coated 81 milliGRAM(s) Oral daily  atorvastatin 10 milliGRAM(s) Oral at bedtime  buDESOnide    EC Capsule 9 milliGRAM(s) Oral daily  cefepime   IVPB 2000 milliGRAM(s) IV Intermittent every 12 hours  dextrose 5%. 1000 milliLiter(s) (50 mL/Hr) IV Continuous <Continuous>  dextrose 50% Injectable 12.5 Gram(s) IV Push once  dextrose 50% Injectable 25 Gram(s) IV Push once  dextrose 50% Injectable 25 Gram(s) IV Push once  diltiazem    Tablet 120 milliGRAM(s) Oral two times a day  ferrous    sulfate 325 milliGRAM(s) Oral daily  gabapentin 300 milliGRAM(s) Oral at bedtime  insulin lispro (HumaLOG) corrective regimen sliding scale   SubCutaneous three times a day before meals  metoprolol succinate  milliGRAM(s) Oral daily  montelukast 10 milliGRAM(s) Oral at bedtime  pantoprazole    Tablet 40 milliGRAM(s) Oral before breakfast  potassium chloride    Tablet ER 20 milliEquivalent(s) Oral two times a day  sucralfate 1 Gram(s) Oral four times a day  warfarin 5 milliGRAM(s) Oral daily      Home Medications:  ascorbic acid 500 mg oral tablet: 1 tab(s) orally once a day (30 Dec 2018 20:57)  aspirin 81 mg oral tablet: 1 tab(s) orally once a day (30 Dec 2018 20:57)  Coumadin 5 mg oral tablet: 1 tab(s) orally once a day (30 Dec 2018 20:57)  dilTIAZem 120 mg oral tablet: 1 tab(s) orally 2 times a day (30 Dec 2018 20:57)  docusate sodium 100 mg oral capsule: 1 cap(s) orally once a day, As Needed (30 Dec 2018 20:57)  ferrous sulfate 250 mg oral capsule, extended release: 1 cap(s) orally once a day (30 Dec 2018 20:57)  freetext medication  -: 9 milligram(s) orally once a day (30 Dec 2018 20:57)  gabapentin 300 mg oral capsule: 1 cap(s) orally once a day (at bedtime) (30 Dec 2018 20:57)  metFORMIN 1000 mg oral tablet: 1 tab(s) orally 2 times a day (30 Dec 2018 20:57)  Metoprolol Succinate  mg oral tablet, extended release: 1 tab(s) orally once a day (30 Dec 2018 20:57)  montelukast 10 mg oral tablet: 1 tab(s) orally once a day (at bedtime) (30 Dec 2018 20:57)  potassium chloride 20 mEq oral tablet, extended release: 1 tab(s) orally 2 times a day (30 Dec 2018 20:57)  pravastatin 10 mg oral tablet: 1 tab(s) orally once a day (30 Dec 2018 20:57)  silver sulfADIAZINE 1% topical cream: 1 application topically once a day (30 Dec 2018 20:57)  Uceris 9 mg oral tablet, extended release: 1 tab(s) orally once a day (in the morning) (30 Dec 2018 20:57)        LABS: All Labs Reviewed:  Blood Culture:   BNP   CBC             WBC Count: 9.11 K/uL ( @ 06:04)  WBC Count: 9.94 K/uL ( @ 11:45)              Hemoglobin: 10.7 g/dL ( 06:04)  Hemoglobin: 11.8 g/dL ( @ 11:45)              Hematocrit: 32.4 % (:04)  Hematocrit: 35.8 % (:45)              Mean Cell Volume: 92.0 fl (:04)  Mean Cell Volume: 89.9 fl (:45)              Platelet Count - Automated: 416 K/uL (:04)  Platelet Count - Automated: 435 K/uL (:45)                        11.0   9.79  )-----------( 455      ( 2019 06:09 )             34.5                               Cardiac markers             Troponin I, Serum: <0.015 ng/mL ( @ 11:45)                             Chems        Sodium, Serum: 141 mmol/L ( 06:04)  Sodium, Serum: 138 mmol/L ( 11:45)          Potassium, Serum: 3.7 mmol/L ( 06:04)  Potassium, Serum: 3.2 mmol/L ( 11:45)          Blood Urea Nitrogen, Serum: 10 mg/dL ( 06:04)  Blood Urea Nitrogen, Serum: 7 mg/dL ( 11:45)          Creatinine 0.44  Creatinine 0.68          Magnesium, Serum: 1.2 mg/dL ( 06:04)          Protein Total, Serum: 6.6 gm/dL ( 06:04)  Protein Total, Serum: 7.2 gm/dL ( 11:45)                  Calcium, Total Serum: 7.4 mg/dL ( 06:04)  Calcium, Total Serum: 7.8 mg/dL ( @ 11:45)          Phosphorus Level, Serum: 2.9 mg/dL ( 06:04)          Bilirubin Total, Serum: 0.5 mg/dL (12-31 @ 06:04)  Bilirubin Total, Serum: 0.6 mg/dL ( @ 11:45)          Alanine Aminotransferase (ALT/SGPT): 22 U/L ( @ 06:04)  Alanine Aminotransferase (ALT/SGPT): 26 U/L ( @ 11:45)          Aspartate Aminotransferase (AST/SGOT): 12 U/L ( @ 06:04)  Aspartate Aminotransferase (AST/SGOT): 13 U/L ( @ 11:45)        140  |  106  |  9   ----------------------------<  147<H>  4.0   |  26  |  0.35<L>    Ca    8.2<L>      2019 06:09  Phos  2.6       Mg     1.9         TPro  6.6  /  Alb  2.0<L>  /  TBili  0.5  /  DBili  x   /  AST  12<L>  /  ALT  22  /  AlkPhos  70        Rapid RVP Result: NotDete: This Respiratory Panel uses polymerase chain reaction (PCR) to detect for  adenovirus; coronavirus (HKU1, NL63, 229E, OC43); human metapneumovirus  (hMPV); human enterovirus/rhinovirus (Entero/RV); influenza A; influenza  A/H1; influenza A/H3; influenza A/H1-2009; influenza B; parainfluenza  viruses 1, 2, 3, 4; respiratory syncytial virus; Mycoplasma pneumoniae;  and Chlamydophila pneumoniae. (18 @ 15:13)             Prothrombin Time and INR, Plasma in AM (19 @ 05:52)    Prothrombin Time, Plasma: 27.7: Effective 2018 the reference range for PT has changed. sec    INR: 2.43 ratio      INR: 1.96 ratio ( @ 06:04)  INR: 2.59 ratio ( @ 11:45)         RADIOLOGY:  < from: CT Chest No Cont (18 @ 21:21) >  IMPRESSION:   1. Since the prior study of 18, there has been the development of   groundglass nodular opacities throughout the right upper lobe with   ill-defined   groundglass opacities right middle lobe and to some degree right lower   lobe.   Some haziness noted possibly due to mild interstitial edema versus motion   artifact. Subpleural emphysematous changes posterior right lower lobe are   unchanged.   Left lung demonstrates mild haziness likely due to motion artifact and/or   small   amount of interstitial edema. Minimal atelectasis lingula portion left   lung.   Appearance of left lung grossly unchanged.  2. As on the prior study 18, nodule is noted measuring 1.5 cm   extending   posteriorly from right lobe of thyroid gland with substernal extension   similar   to prior study.   3.Remainder of the study is nonacute and stable.      < end of copied text >      EKG:  Atrial fibrillation with a rapid VR    Telemetry: Atrial fibrillation with a controlled VR    ECHO:  < from: Transthoracic Echocardiogram Follow Up (18 @ 13:50) >  Summary     Limited study to asses pericardial effusion.   Estimated left ventricular ejection fraction is 55-60 %.   No evidence of pericardial effusion.    < end of copied text > CHIEF COMPLAINT: Patient is a 68y old  Female who presents with a chief complaint of c/o fever and SOB (30 Dec 2018 19:55)      HPI: HPI:  67 yo F with PMH significant for HTN, HLD, CHF HFpEF, Afib on Coumadin, Morbid Obesity, Chronic venous insufficiency,  DM2 presents to the ED c/o fever and SOB x 7 days.     Pt reported that she has been having fever 99-101F at home x 7 days  and exertional dyspnea for the past 2-3 days. Pt reported that she was seen at Dr. Mayer office and was prescribed Doxycycline PO which Pt has been taking for the past 6 days with no significant improvement so came to the ED for eval. On arrival to the ED, Fever 101.1F, Tachycardiac Afib with RVR HR: 140's on arrival , now 's. ON labs, Lactate: 3.1, Pt received IVf Ns 500 cc x 1 in the ED. RVP panel negative. c/o mild dysuria.  Denies headache dizziness, chest pain, palpitation or SOB at rest. Denies cough, diarrhea, abdominal pain, N/V. Denies recent travel. Former smoker, quit about 30 + yrs ago.     Family h/o: Pt reported that Mother had hx of Breast Ca  in her 80's.   Social h/o: Former smoker, quit about 30 + yrs ago, Denies drinking alcohol. (30 Dec 2018 19:55)    2019:  Patient remains in the SD  2019:  Patient remains in the SD      PMHx: PAST MEDICAL & SURGICAL HISTORY:  HTN (hypertension)  Thyroid nodule  Peripheral venous insufficiency  Neuropathy  Morbid obesity with BMI of 40.0-44.9, adult  Dyspnea  Congestive heart failure/HFpEF  Atrial fibrillation  Diabetes mellitus type II, controlled  Status post medial meniscus repair  S/P left knee arthroscopy  Other complications of gastric band procedure  H/O colonoscopy  History of esophagogastroduodenoscopy (EGD)        Soc Hx:     FAMILY HISTORY:  Family history of diabetes mellitus in mother (Mother)  Family history of breast cancer in mother (Mother)      Allergies: Allergies    penicillin (Hives)    Intolerances          REVIEW OF SYSTEMS:    As above  No chest pain + shortness of breath-worse over last 24 hours  No lightheadedness or syncope  No leg swelling  No palpitations  No claudication-like symptoms    ICU Vital Signs Last 24 Hrs  T(C): 36.7 (2019 06:00), Max: 36.9 (2019 16:47)  T(F): 98.1 (2019 06:00), Max: 98.5 (2019 16:47)  HR: 87 (2019 06:00) (65 - 89)  BP: 162/100 (2019 05:00) (97/81 - 162/100)  BP(mean): 113 (2019 05:00) (41 - 113)  ABP: --  ABP(mean): --  RR: 19 (2019 06:00) (14 - 26)  SpO2: 97% (2019 06:00) (90% - 100%)        I&O's Summary    30 Dec 2018 07:01  -  31 Dec 2018 07:00  --------------------------------------------------------  IN: 620 mL / OUT: 0 mL / NET: 620 mL        CAPILLARY BLOOD GLUCOSE      POCT Blood Glucose.: 161 mg/dL (30 Dec 2018 23:57)      PHYSICAL EXAM:   Patient in NAD  Neck: No JVD; Carotids:  2+ without bruits  Respiratory:  Clear to A&P  Cardiovascular: S1 and S2, irregular rate and rhythm, no Murmurs, gallops or rubs  Gastrointestinal:  Soft, non-tender; BS positive  Chronic bilateral LE edema/venous stasis changes  Neurological: A/O x 3, no focal deficits      MEDICATIONS  (STANDING):  ascorbic acid 500 milliGRAM(s) Oral daily  aspirin enteric coated 81 milliGRAM(s) Oral daily  atorvastatin 10 milliGRAM(s) Oral at bedtime  buDESOnide    EC Capsule 9 milliGRAM(s) Oral daily  cefepime   IVPB 2000 milliGRAM(s) IV Intermittent every 12 hours  dextrose 5%. 1000 milliLiter(s) (50 mL/Hr) IV Continuous <Continuous>  dextrose 50% Injectable 12.5 Gram(s) IV Push once  dextrose 50% Injectable 25 Gram(s) IV Push once  dextrose 50% Injectable 25 Gram(s) IV Push once  diltiazem    Tablet 120 milliGRAM(s) Oral two times a day  ferrous    sulfate 325 milliGRAM(s) Oral daily  gabapentin 300 milliGRAM(s) Oral at bedtime  insulin lispro (HumaLOG) corrective regimen sliding scale   SubCutaneous three times a day before meals  metoprolol succinate  milliGRAM(s) Oral daily  montelukast 10 milliGRAM(s) Oral at bedtime  pantoprazole    Tablet 40 milliGRAM(s) Oral before breakfast  potassium chloride    Tablet ER 20 milliEquivalent(s) Oral two times a day  sucralfate 1 Gram(s) Oral four times a day  warfarin 5 milliGRAM(s) Oral daily      Home Medications:  ascorbic acid 500 mg oral tablet: 1 tab(s) orally once a day (30 Dec 2018 20:57)  aspirin 81 mg oral tablet: 1 tab(s) orally once a day (30 Dec 2018 20:57)  Coumadin 5 mg oral tablet: 1 tab(s) orally once a day (30 Dec 2018 20:57)  dilTIAZem 120 mg oral tablet: 1 tab(s) orally 2 times a day (30 Dec 2018 20:57)  docusate sodium 100 mg oral capsule: 1 cap(s) orally once a day, As Needed (30 Dec 2018 20:57)  ferrous sulfate 250 mg oral capsule, extended release: 1 cap(s) orally once a day (30 Dec 2018 20:57)  freetext medication  -: 9 milligram(s) orally once a day (30 Dec 2018 20:57)  gabapentin 300 mg oral capsule: 1 cap(s) orally once a day (at bedtime) (30 Dec 2018 20:57)  metFORMIN 1000 mg oral tablet: 1 tab(s) orally 2 times a day (30 Dec 2018 20:57)  Metoprolol Succinate  mg oral tablet, extended release: 1 tab(s) orally once a day (30 Dec 2018 20:57)  montelukast 10 mg oral tablet: 1 tab(s) orally once a day (at bedtime) (30 Dec 2018 20:57)  potassium chloride 20 mEq oral tablet, extended release: 1 tab(s) orally 2 times a day (30 Dec 2018 20:57)  pravastatin 10 mg oral tablet: 1 tab(s) orally once a day (30 Dec 2018 20:57)  silver sulfADIAZINE 1% topical cream: 1 application topically once a day (30 Dec 2018 20:57)  Uceris 9 mg oral tablet, extended release: 1 tab(s) orally once a day (in the morning) (30 Dec 2018 20:57)        LABS: All Labs Reviewed:  Blood Culture:   BNP   CBC             WBC Count: 9.11 K/uL ( @ 06:04)  WBC Count: 9.94 K/uL ( @ 11:45)              Hemoglobin: 10.7 g/dL ( 06:04)  Hemoglobin: 11.8 g/dL ( 11:45)              Hematocrit: 32.4 % (:04)  Hematocrit: 35.8 % ( @ :45)              Mean Cell Volume: 92.0 fl (:04)  Mean Cell Volume: 89.9 fl (:45)              Platelet Count - Automated: 416 K/uL (:04)  Platelet Count - Automated: 435 K/uL (:45)                        11.0   9.79  )-----------( 455      ( 2019 06:09 )             34.5                               Cardiac markers             Troponin I, Serum: <0.015 ng/mL ( @ 11:45)                             Chems        Sodium, Serum: 141 mmol/L ( 06:04)  Sodium, Serum: 138 mmol/L ( 11:45)          Potassium, Serum: 3.7 mmol/L ( 06:04)  Potassium, Serum: 3.2 mmol/L ( 11:45)          Blood Urea Nitrogen, Serum: 10 mg/dL (:04)  Blood Urea Nitrogen, Serum: 7 mg/dL (:45)          Creatinine 0.44  Creatinine 0.68          Magnesium, Serum: 1.2 mg/dL ( 06:04)          Protein Total, Serum: 6.6 gm/dL ( 06:04)  Protein Total, Serum: 7.2 gm/dL (:45)                  Calcium, Total Serum: 7.4 mg/dL ( 06:04)  Calcium, Total Serum: 7.8 mg/dL ( 11:45)          Phosphorus Level, Serum: 2.9 mg/dL ( 06:04)          Bilirubin Total, Serum: 0.5 mg/dL ( @ 06:04)  Bilirubin Total, Serum: 0.6 mg/dL ( @ 11:45)          Alanine Aminotransferase (ALT/SGPT): 22 U/L ( @ 06:04)  Alanine Aminotransferase (ALT/SGPT): 26 U/L ( @ 11:45)          Aspartate Aminotransferase (AST/SGOT): 12 U/L ( @ 06:04)  Aspartate Aminotransferase (AST/SGOT): 13 U/L ( @ 11:45)        140  |  106  |  9   ----------------------------<  147<H>  4.0   |  26  |  0.35<L>    Ca    8.2<L>      2019 06:09  Phos  2.6       Mg     1.9         TPro  6.6  /  Alb  2.0<L>  /  TBili  0.5  /  DBili  x   /  AST  12<L>  /  ALT  22  /  AlkPhos  70        Rapid RVP Result: NotDete: This Respiratory Panel uses polymerase chain reaction (PCR) to detect for  adenovirus; coronavirus (HKU1, NL63, 229E, OC43); human metapneumovirus  (hMPV); human enterovirus/rhinovirus (Entero/RV); influenza A; influenza  A/H1; influenza A/H3; influenza A/H1-2009; influenza B; parainfluenza  viruses 1, 2, 3, 4; respiratory syncytial virus; Mycoplasma pneumoniae;  and Chlamydophila pneumoniae. (18 @ 15:13)             Prothrombin Time and INR, Plasma in AM (19 @ 05:52)    Prothrombin Time, Plasma: 27.7: Effective 2018 the reference range for PT has changed. sec    INR: 2.43 ratio      INR: 1.96 ratio ( @ 06:04)  INR: 2.59 ratio ( @ 11:45)         RADIOLOGY:  < from: CT Chest No Cont (18 @ 21:21) >  IMPRESSION:   1. Since the prior study of 18, there has been the development of   groundglass nodular opacities throughout the right upper lobe with   ill-defined   groundglass opacities right middle lobe and to some degree right lower   lobe.   Some haziness noted possibly due to mild interstitial edema versus motion   artifact. Subpleural emphysematous changes posterior right lower lobe are   unchanged.   Left lung demonstrates mild haziness likely due to motion artifact and/or   small   amount of interstitial edema. Minimal atelectasis lingula portion left   lung.   Appearance of left lung grossly unchanged.  2. As on the prior study 18, nodule is noted measuring 1.5 cm   extending   posteriorly from right lobe of thyroid gland with substernal extension   similar   to prior study.   3.Remainder of the study is nonacute and stable.      < end of copied text >      EKG:  Atrial fibrillation with a rapid VR    Telemetry: Atrial fibrillation with a controlled VR    ECHO:  < from: Transthoracic Echocardiogram Follow Up (18 @ 13:50) >  Summary     Limited study to asses pericardial effusion.   Estimated left ventricular ejection fraction is 55-60 %.   No evidence of pericardial effusion.    < end of copied text > CHIEF COMPLAINT: Patient is a 68y old  Female who presents with a chief complaint of c/o fever and SOB (30 Dec 2018 19:55)      HPI: HPI:  69 yo F with PMH significant for HTN, HLD, CHF HFpEF, Afib on Coumadin, Morbid Obesity, Chronic venous insufficiency,  DM2 presents to the ED c/o fever and SOB x 7 days.     Pt reported that she has been having fever 99-101F at home x 7 days  and exertional dyspnea for the past 2-3 days. Pt reported that she was seen at Dr. Mayer office and was prescribed Doxycycline PO which Pt has been taking for the past 6 days with no significant improvement so came to the ED for eval. On arrival to the ED, Fever 101.1F, Tachycardiac Afib with RVR HR: 140's on arrival , now 's. ON labs, Lactate: 3.1, Pt received IVf Ns 500 cc x 1 in the ED. RVP panel negative. c/o mild dysuria.  Denies headache dizziness, chest pain, palpitation or SOB at rest. Denies cough, diarrhea, abdominal pain, N/V. Denies recent travel. Former smoker, quit about 30 + yrs ago.     Family h/o: Pt reported that Mother had hx of Breast Ca  in her 80's.   Social h/o: Former smoker, quit about 30 + yrs ago, Denies drinking alcohol. (30 Dec 2018 19:55)    2019:  Patient remains in the SD  2019:  Patient remains in the SD      PMHx: PAST MEDICAL & SURGICAL HISTORY:  HTN (hypertension)  Thyroid nodule  Peripheral venous insufficiency  Neuropathy  Morbid obesity with BMI of 40.0-44.9, adult  Dyspnea  Congestive heart failure/HFpEF  Atrial fibrillation  Diabetes mellitus type II, controlled  Status post medial meniscus repair  S/P left knee arthroscopy  Other complications of gastric band procedure  H/O colonoscopy  History of esophagogastroduodenoscopy (EGD)        Soc Hx:     FAMILY HISTORY:  Family history of diabetes mellitus in mother (Mother)  Family history of breast cancer in mother (Mother)      Allergies: Allergies    penicillin (Hives)    Intolerances          REVIEW OF SYSTEMS:    As above  No chest pain + shortness of breath-worse over last 24 hours  No lightheadedness or syncope  No leg swelling  No palpitations  No claudication-like symptoms    ICU Vital Signs Last 24 Hrs  T(C): 36.7 (2019 06:00), Max: 36.9 (2019 16:47)  T(F): 98.1 (2019 06:00), Max: 98.5 (2019 16:47)  HR: 87 (2019 06:00) (65 - 89)  BP: 162/100 (2019 05:00) (97/81 - 162/100)  BP(mean): 113 (2019 05:00) (41 - 113)  ABP: --  ABP(mean): --  RR: 19 (2019 06:00) (14 - 26)  SpO2: 97% (2019 06:00) (90% - 100%)        I&O's Summary    30 Dec 2018 07:01  -  31 Dec 2018 07:00  --------------------------------------------------------  IN: 620 mL / OUT: 0 mL / NET: 620 mL        CAPILLARY BLOOD GLUCOSE      POCT Blood Glucose.: 161 mg/dL (30 Dec 2018 23:57)      PHYSICAL EXAM:   Patient in NAD  Neck: No JVD; Carotids:  2+ without bruits  Respiratory:  Clear to A&P  Cardiovascular: S1 and S2, irregular rate and rhythm, no Murmurs, gallops or rubs  Gastrointestinal:  Soft, non-tender; BS positive  Chronic bilateral LE edema/venous stasis changes  Neurological: A/O x 3, no focal deficits      MEDICATIONS  (STANDING):  ascorbic acid 500 milliGRAM(s) Oral daily  aspirin enteric coated 81 milliGRAM(s) Oral daily  atorvastatin 10 milliGRAM(s) Oral at bedtime  buDESOnide    EC Capsule 9 milliGRAM(s) Oral daily  cefepime   IVPB 2000 milliGRAM(s) IV Intermittent every 12 hours  dextrose 5%. 1000 milliLiter(s) (50 mL/Hr) IV Continuous <Continuous>  dextrose 50% Injectable 12.5 Gram(s) IV Push once  dextrose 50% Injectable 25 Gram(s) IV Push once  dextrose 50% Injectable 25 Gram(s) IV Push once  diltiazem    Tablet 120 milliGRAM(s) Oral two times a day  ferrous    sulfate 325 milliGRAM(s) Oral daily  gabapentin 300 milliGRAM(s) Oral at bedtime  insulin lispro (HumaLOG) corrective regimen sliding scale   SubCutaneous three times a day before meals  metoprolol succinate  milliGRAM(s) Oral daily  montelukast 10 milliGRAM(s) Oral at bedtime  pantoprazole    Tablet 40 milliGRAM(s) Oral before breakfast  potassium chloride    Tablet ER 20 milliEquivalent(s) Oral two times a day  sucralfate 1 Gram(s) Oral four times a day  warfarin 5 milliGRAM(s) Oral daily      Home Medications:  ascorbic acid 500 mg oral tablet: 1 tab(s) orally once a day (30 Dec 2018 20:57)  aspirin 81 mg oral tablet: 1 tab(s) orally once a day (30 Dec 2018 20:57)  Coumadin 5 mg oral tablet: 1 tab(s) orally once a day (30 Dec 2018 20:57)  dilTIAZem 120 mg oral tablet: 1 tab(s) orally 2 times a day (30 Dec 2018 20:57)  docusate sodium 100 mg oral capsule: 1 cap(s) orally once a day, As Needed (30 Dec 2018 20:57)  ferrous sulfate 250 mg oral capsule, extended release: 1 cap(s) orally once a day (30 Dec 2018 20:57)  freetext medication  -: 9 milligram(s) orally once a day (30 Dec 2018 20:57)  gabapentin 300 mg oral capsule: 1 cap(s) orally once a day (at bedtime) (30 Dec 2018 20:57)  metFORMIN 1000 mg oral tablet: 1 tab(s) orally 2 times a day (30 Dec 2018 20:57)  Metoprolol Succinate  mg oral tablet, extended release: 1 tab(s) orally once a day (30 Dec 2018 20:57)  montelukast 10 mg oral tablet: 1 tab(s) orally once a day (at bedtime) (30 Dec 2018 20:57)  potassium chloride 20 mEq oral tablet, extended release: 1 tab(s) orally 2 times a day (30 Dec 2018 20:57)  pravastatin 10 mg oral tablet: 1 tab(s) orally once a day (30 Dec 2018 20:57)  silver sulfADIAZINE 1% topical cream: 1 application topically once a day (30 Dec 2018 20:57)  Uceris 9 mg oral tablet, extended release: 1 tab(s) orally once a day (in the morning) (30 Dec 2018 20:57)        LABS: All Labs Reviewed:  Blood Culture:   BNP   CBC             WBC Count: 9.11 K/uL ( @ 06:04)  WBC Count: 9.94 K/uL ( @ 11:45)              Hemoglobin: 10.7 g/dL ( 06:04)  Hemoglobin: 11.8 g/dL ( 11:45)              Hematocrit: 32.4 % (:04)  Hematocrit: 35.8 % ( @ :45)              Mean Cell Volume: 92.0 fl (:04)  Mean Cell Volume: 89.9 fl (:45)              Platelet Count - Automated: 416 K/uL (:04)  Platelet Count - Automated: 435 K/uL (:45)                        11.0   9.79  )-----------( 455      ( 2019 06:09 )             34.5                               Cardiac markers             Troponin I, Serum: <0.015 ng/mL ( @ 11:45)                             [place]        Sodium, Serum: 141 mmol/L ( 06:04)  Sodium, Serum: 138 mmol/L ( 11:45)          Potassium, Serum: 3.7 mmol/L ( 06:04)  Potassium, Serum: 3.2 mmol/L ( 11:45)          Blood Urea Nitrogen, Serum: 10 mg/dL (:04)  Blood Urea Nitrogen, Serum: 7 mg/dL (:45)          Creatinine 0.44  Creatinine 0.68          Magnesium, Serum: 1.2 mg/dL ( 06:04)          Protein Total, Serum: 6.6 gm/dL ( 06:04)  Protein Total, Serum: 7.2 gm/dL (:45)                  Calcium, Total Serum: 7.4 mg/dL ( 06:04)  Calcium, Total Serum: 7.8 mg/dL ( 11:45)          Phosphorus Level, Serum: 2.9 mg/dL ( 06:04)          Bilirubin Total, Serum: 0.5 mg/dL ( @ 06:04)  Bilirubin Total, Serum: 0.6 mg/dL ( @ 11:45)          Alanine Aminotransferase (ALT/SGPT): 22 U/L ( @ 06:04)  Alanine Aminotransferase (ALT/SGPT): 26 U/L ( @ 11:45)          Aspartate Aminotransferase (AST/SGOT): 12 U/L ( @ 06:04)  Aspartate Aminotransferase (AST/SGOT): 13 U/L ( @ 11:45)        140  |  106  |  9   ----------------------------<  147<H>  4.0   |  26  |  0.35<L>    Ca    8.2<L>      2019 06:09  Phos  2.6       Mg     1.9         TPro  6.6  /  Alb  2.0<L>  /  TBili  0.5  /  DBili  x   /  AST  12<L>  /  ALT  22  /  AlkPhos  70        Rapid RVP Result: NotDete: This Respiratory Panel uses polymerase chain reaction (PCR) to detect for  adenovirus; coronavirus (HKU1, NL63, 229E, OC43); human metapneumovirus  (hMPV); human enterovirus/rhinovirus (Entero/RV); influenza A; influenza  A/H1; influenza A/H3; influenza A/H1-2009; influenza B; parainfluenza  viruses 1, 2, 3, 4; respiratory syncytial virus; Mycoplasma pneumoniae;  and Chlamydophila pneumoniae. (18 @ 15:13)             Prothrombin Time and INR, Plasma in AM (19 @ 05:52)    Prothrombin Time, Plasma: 27.7: Effective 2018 the reference range for PT has changed. sec    INR: 2.43 ratio      INR: 1.96 ratio ( @ 06:04)  INR: 2.59 ratio ( @ 11:45)         RADIOLOGY:  < from: CT Chest No Cont (18 @ 21:21) >  IMPRESSION:   1. Since the prior study of 18, there has been the development of   groundglass nodular opacities throughout the right upper lobe with   ill-defined   groundglass opacities right middle lobe and to some degree right lower   lobe.   Some haziness noted possibly due to mild interstitial edema versus motion   artifact. Subpleural emphysematous changes posterior right lower lobe are   unchanged.   Left lung demonstrates mild haziness likely due to motion artifact and/or   small   amount of interstitial edema. Minimal atelectasis lingula portion left   lung.   Appearance of left lung grossly unchanged.  2. As on the prior study 18, nodule is noted measuring 1.5 cm   extending   posteriorly from right lobe of thyroid gland with substernal extension   similar   to prior study.   3.Remainder of the study is nonacute and stable.      < end of copied text >      EKG:  Atrial fibrillation with a rapid VR    Telemetry: Atrial fibrillation with a controlled VR    ECHO:  < from: Transthoracic Echocardiogram Follow Up (18 @ 13:50) >  Summary     Limited study to asses pericardial effusion.   Estimated left ventricular ejection fraction is 55-60 %.   No evidence of pericardial effusion.    < end of copied text >

## 2019-01-02 NOTE — PHYSICAL THERAPY INITIAL EVALUATION ADULT - PERTINENT HX OF CURRENT PROBLEM, REHAB EVAL
Pt admitted to  secondary to fevers and SOB x 7 days PTA. Pt saw Dr. Mayer in office and was prescribed doxy with no improvement. Pt came to ER. Pt admitted to  secondary to fevers and SOB x 7 days PTA. Pt saw Dr. Mayer in office and was prescribed doxy with no improvement. Pt came to ER. INR-2.43.

## 2019-01-02 NOTE — PROGRESS NOTE ADULT - SUBJECTIVE AND OBJECTIVE BOX
Date of service: 01-02-19 @ 09:29    OOB to chair  No SOB at rest, but felt SOB overnight when going to bathroom  Has dry cough    ROS: no fever or chills; denies dizziness, no HA, no abdominal pain, no diarrhea or constipation; no dysuria, no legs pain, no rashes    MEDICATIONS  (STANDING):  ascorbic acid 500 milliGRAM(s) Oral daily  aspirin enteric coated 81 milliGRAM(s) Oral daily  atorvastatin 10 milliGRAM(s) Oral at bedtime  buDESOnide    EC Capsule 9 milliGRAM(s) Oral daily  cefepime   IVPB 2000 milliGRAM(s) IV Intermittent every 12 hours  dextrose 5%. 1000 milliLiter(s) (50 mL/Hr) IV Continuous <Continuous>  dextrose 50% Injectable 12.5 Gram(s) IV Push once  dextrose 50% Injectable 25 Gram(s) IV Push once  dextrose 50% Injectable 25 Gram(s) IV Push once  diltiazem    Tablet 120 milliGRAM(s) Oral two times a day  ferrous    sulfate 325 milliGRAM(s) Oral daily  furosemide   Injectable 40 milliGRAM(s) IV Push daily  gabapentin 300 milliGRAM(s) Oral at bedtime  insulin lispro (HumaLOG) corrective regimen sliding scale   SubCutaneous three times a day before meals  metoprolol succinate  milliGRAM(s) Oral daily  montelukast 10 milliGRAM(s) Oral at bedtime  pantoprazole    Tablet 40 milliGRAM(s) Oral before breakfast  potassium chloride    Tablet ER 20 milliEquivalent(s) Oral two times a day  sucralfate 1 Gram(s) Oral four times a day  warfarin 5 milliGRAM(s) Oral daily      Vital Signs Last 24 Hrs  T(C): 36.7 (02 Jan 2019 06:00), Max: 36.9 (01 Jan 2019 16:47)  T(F): 98.1 (02 Jan 2019 06:00), Max: 98.5 (01 Jan 2019 16:47)  HR: 78 (02 Jan 2019 09:00) (65 - 89)  BP: 151/77 (02 Jan 2019 07:00) (97/81 - 162/100)  BP(mean): 96 (02 Jan 2019 07:00) (41 - 113)  RR: 22 (02 Jan 2019 09:00) (14 - 26)  SpO2: 99% (02 Jan 2019 09:00) (90% - 100%)    Physical Exam:      Constitutional: frail looking  HEENT: NC/AT, EOMI, PERRLA, conjunctivae clear  Neck: supple; thyroid not palpable  Back: no tenderness  Respiratory: respiratory effort normal; decreased BS at bases; few crackles at bases  Cardiovascular: S1S2 regular, no murmurs  Abdomen: soft, not tender, not distended, positive BS  Genitourinary: no suprapubic tenderness  Lymphatic: no LN palpable  Musculoskeletal: no muscle tenderness, no joint swelling or tenderness  Extremities: no pedal edema  Neurological/ Psychiatric: AxOx3, moving all extremities  Skin: no rashes; no palpable lesions    Labs: reviewed                        11.1   11.81 )-----------( 474      ( 02 Jan 2019 05:52 )             34.9     01-02    140  |  107  |  13  ----------------------------<  151<H>  4.2   |  25  |  0.45<L>    Ca    8.3<L>      02 Jan 2019 05:52  Phos  2.6     01-01  Mg     1.9     01-01                        10.7   9.11  )-----------( 416      ( 31 Dec 2018 06:04 )             32.4     12-31    141  |  106  |  10  ----------------------------<  122<H>  3.7   |  26  |  0.44<L>    Ca    7.4<L>      31 Dec 2018 06:04  Phos  2.9     12-31  Mg     1.2     12-31    TPro  6.6  /  Alb  2.0<L>  /  TBili  0.5  /  DBili  x   /  AST  12<L>  /  ALT  22  /  AlkPhos  70  12-31     LIVER FUNCTIONS - ( 31 Dec 2018 06:04 )  Alb: 2.0 g/dL / Pro: 6.6 gm/dL / ALK PHOS: 70 U/L / ALT: 22 U/L / AST: 12 U/L / GGT: x             Culture - Blood (collected 30 Dec 2018 11:45)  Source: .Blood None  Preliminary Report (31 Dec 2018 15:01):    No growth to date.    Culture - Blood (collected 30 Dec 2018 11:45)  Source: .Blood None  Preliminary Report (31 Dec 2018 15:01):    No growth to date.        Radiology: all available radiological tests reviewed    < from: CT Chest No Cont (12.30.18 @ 21:21) >  Several groundglass opacities identified within the right   upper lobe, likely reflecting pneumonia given the clinical context. Rest   of the chronic findings as above.    < end of copied text >      Advanced directives addressed: full resuscitation

## 2019-01-03 LAB
ANION GAP SERPL CALC-SCNC: 8 MMOL/L — SIGNIFICANT CHANGE UP (ref 5–17)
BUN SERPL-MCNC: 14 MG/DL — SIGNIFICANT CHANGE UP (ref 7–23)
CALCIUM SERPL-MCNC: 8.5 MG/DL — SIGNIFICANT CHANGE UP (ref 8.5–10.1)
CHLORIDE SERPL-SCNC: 107 MMOL/L — SIGNIFICANT CHANGE UP (ref 96–108)
CO2 SERPL-SCNC: 28 MMOL/L — SIGNIFICANT CHANGE UP (ref 22–31)
CREAT SERPL-MCNC: 0.46 MG/DL — LOW (ref 0.5–1.3)
GLUCOSE BLDC GLUCOMTR-MCNC: 120 MG/DL — HIGH (ref 70–99)
GLUCOSE BLDC GLUCOMTR-MCNC: 123 MG/DL — HIGH (ref 70–99)
GLUCOSE BLDC GLUCOMTR-MCNC: 145 MG/DL — HIGH (ref 70–99)
GLUCOSE BLDC GLUCOMTR-MCNC: 182 MG/DL — HIGH (ref 70–99)
GLUCOSE SERPL-MCNC: 123 MG/DL — HIGH (ref 70–99)
HCT VFR BLD CALC: 36.9 % — SIGNIFICANT CHANGE UP (ref 34.5–45)
HGB BLD-MCNC: 11.7 G/DL — SIGNIFICANT CHANGE UP (ref 11.5–15.5)
INR BLD: 2.5 RATIO — HIGH (ref 0.88–1.16)
MCHC RBC-ENTMCNC: 30.2 PG — SIGNIFICANT CHANGE UP (ref 27–34)
MCHC RBC-ENTMCNC: 31.7 GM/DL — LOW (ref 32–36)
MCV RBC AUTO: 95.1 FL — SIGNIFICANT CHANGE UP (ref 80–100)
NRBC # BLD: 0 /100 WBCS — SIGNIFICANT CHANGE UP (ref 0–0)
PLATELET # BLD AUTO: 519 K/UL — HIGH (ref 150–400)
POTASSIUM SERPL-MCNC: 3.7 MMOL/L — SIGNIFICANT CHANGE UP (ref 3.5–5.3)
POTASSIUM SERPL-SCNC: 3.7 MMOL/L — SIGNIFICANT CHANGE UP (ref 3.5–5.3)
PROTHROM AB SERPL-ACNC: 28.6 SEC — HIGH (ref 10–12.9)
RBC # BLD: 3.88 M/UL — SIGNIFICANT CHANGE UP (ref 3.8–5.2)
RBC # FLD: 14.2 % — SIGNIFICANT CHANGE UP (ref 10.3–14.5)
SODIUM SERPL-SCNC: 143 MMOL/L — SIGNIFICANT CHANGE UP (ref 135–145)
WBC # BLD: 11.27 K/UL — HIGH (ref 3.8–10.5)
WBC # FLD AUTO: 11.27 K/UL — HIGH (ref 3.8–10.5)

## 2019-01-03 RX ADMIN — Medication 81 MILLIGRAM(S): at 12:23

## 2019-01-03 RX ADMIN — Medication 1 GRAM(S): at 12:24

## 2019-01-03 RX ADMIN — Medication 20 MILLIEQUIVALENT(S): at 05:59

## 2019-01-03 RX ADMIN — Medication 100 MILLIGRAM(S): at 05:59

## 2019-01-03 RX ADMIN — PANTOPRAZOLE SODIUM 40 MILLIGRAM(S): 20 TABLET, DELAYED RELEASE ORAL at 06:01

## 2019-01-03 RX ADMIN — ZOLPIDEM TARTRATE 5 MILLIGRAM(S): 10 TABLET ORAL at 23:34

## 2019-01-03 RX ADMIN — Medication 40 MILLIGRAM(S): at 13:09

## 2019-01-03 RX ADMIN — Medication 1 GRAM(S): at 17:30

## 2019-01-03 RX ADMIN — Medication 20 MILLIEQUIVALENT(S): at 17:30

## 2019-01-03 RX ADMIN — ATORVASTATIN CALCIUM 10 MILLIGRAM(S): 80 TABLET, FILM COATED ORAL at 21:03

## 2019-01-03 RX ADMIN — CEFEPIME 100 MILLIGRAM(S): 1 INJECTION, POWDER, FOR SOLUTION INTRAMUSCULAR; INTRAVENOUS at 06:00

## 2019-01-03 RX ADMIN — Medication 9 MILLIGRAM(S): at 05:58

## 2019-01-03 RX ADMIN — Medication 500 MILLIGRAM(S): at 12:23

## 2019-01-03 RX ADMIN — Medication 1 GRAM(S): at 05:59

## 2019-01-03 RX ADMIN — GABAPENTIN 300 MILLIGRAM(S): 400 CAPSULE ORAL at 21:03

## 2019-01-03 RX ADMIN — Medication 1: at 12:31

## 2019-01-03 RX ADMIN — CEFEPIME 100 MILLIGRAM(S): 1 INJECTION, POWDER, FOR SOLUTION INTRAMUSCULAR; INTRAVENOUS at 17:30

## 2019-01-03 RX ADMIN — WARFARIN SODIUM 5 MILLIGRAM(S): 2.5 TABLET ORAL at 21:03

## 2019-01-03 RX ADMIN — MONTELUKAST 10 MILLIGRAM(S): 4 TABLET, CHEWABLE ORAL at 21:03

## 2019-01-03 RX ADMIN — Medication 1 GRAM(S): at 23:34

## 2019-01-03 RX ADMIN — Medication 650 MILLIGRAM(S): at 06:27

## 2019-01-03 RX ADMIN — Medication 325 MILLIGRAM(S): at 12:23

## 2019-01-03 NOTE — PROGRESS NOTE ADULT - ASSESSMENT
67 y/o Female with h/o HTN, HLD, CHF HFpEF, A.fib on Coumadin, morbid Obesity, chronic venous insufficiency, recurrent legs cellulitis, DM2 was admitted on 12/30 for fever and SOB x 7 days. Pt reported that she has been having fever to 99-101F at home x 7 days  and exertional dyspnea for the past 2-3 days. Pt reported that she was seen at Dr. Mayer office and was prescribed Doxycycline PO for the past 6 days PTA with no significant improvement. On arrival to the ED, she had fever to 101.1F, tachycardiac Afib with RVR. She received levofloxacin.    1. RUL pneumonia. Allergy to PCN.  -dyspnea is improved  -mild leukocytosis  -dysphagia ?related to "pills esophagitis"  -BC x 2 are negative to date  -on cefepime 2 gm IV q12h # 4  -tolerating abx well so far; no side effects noted  -monitor closely in ej of PCN allergy history  -continue abx coverage  -monitor temps  -f/u CBC  -supportive care  2. Other issues:   -care per medicine

## 2019-01-03 NOTE — PROGRESS NOTE ADULT - ASSESSMENT
67yo female with multiple medical issues  continue carafate   given improvement, no plan for endoscopy or esophagram  continue PPI, carafate    no symptoms of active colitis

## 2019-01-03 NOTE — PROGRESS NOTE ADULT - ASSESSMENT
68 year old woman with the above history admitted with fever and shortness of breath.  There are new abnormal findings in the right lung on CT of the chest.  Consider pulmonary consult.  She was given IV fluids in the ER with slowing of her atrial fibrillation rate.  Will hold on diuretics at the present time.  Will discuss with hospitalist regarding her current status.  No evidence for ACS    01/01/2019:  Patient appears stable from a cardiac standpoint.  Continue present management.  Resume diuretics as an outpatient.    01/02/2019:  Patient's BP is high today(was previously normal).  Also with sob.  Probable acute on chronic HFpEF and high BP is probably fluid related.  Will restart furosemide.    1/3/19:  Patient and BP improved with IV furosemide.  Will continue

## 2019-01-03 NOTE — PROGRESS NOTE ADULT - SUBJECTIVE AND OBJECTIVE BOX
CHIEF COMPLAINT: Patient is a 68y old  Female who presents with a chief complaint of c/o fever and SOB (30 Dec 2018 19:55)      HPI: HPI:  67 yo F with PMH significant for HTN, HLD, CHF HFpEF, Afib on Coumadin, Morbid Obesity, Chronic venous insufficiency,  DM2 presents to the ED c/o fever and SOB x 7 days.     Pt reported that she has been having fever 99-101F at home x 7 days  and exertional dyspnea for the past 2-3 days. Pt reported that she was seen at Dr. Mayer office and was prescribed Doxycycline PO which Pt has been taking for the past 6 days with no significant improvement so came to the ED for eval. On arrival to the ED, Fever 101.1F, Tachycardiac Afib with RVR HR: 140's on arrival , now 's. ON labs, Lactate: 3.1, Pt received IVf Ns 500 cc x 1 in the ED. RVP panel negative. c/o mild dysuria.  Denies headache dizziness, chest pain, palpitation or SOB at rest. Denies cough, diarrhea, abdominal pain, N/V. Denies recent travel. Former smoker, quit about 30 + yrs ago.     Family h/o: Pt reported that Mother had hx of Breast Ca  in her 80's.   Social h/o: Former smoker, quit about 30 + yrs ago, Denies drinking alcohol. (30 Dec 2018 19:55)    2019:  Patient remains in the SD  2019:  Patient remains in the SD  1/3/19:  Patient apparently had a good response to IV furosemide.      PMHx: PAST MEDICAL & SURGICAL HISTORY:  HTN (hypertension)  Thyroid nodule  Peripheral venous insufficiency  Neuropathy  Morbid obesity with BMI of 40.0-44.9, adult  Dyspnea  Congestive heart failure/HFpEF  Atrial fibrillation  Diabetes mellitus type II, controlled  Status post medial meniscus repair  S/P left knee arthroscopy  Other complications of gastric band procedure  H/O colonoscopy  History of esophagogastroduodenoscopy (EGD)        Soc Hx:     FAMILY HISTORY:  Family history of diabetes mellitus in mother (Mother)  Family history of breast cancer in mother (Mother)      Allergies: Allergies    penicillin (Hives)    Intolerances          REVIEW OF SYSTEMS:    As above  No chest pain sob improved  No lightheadedness or syncope  No leg swelling  No palpitations  No claudication-like symptoms    ICU Vital Signs Last 24 Hrs  T(C): 36.8 (2019 05:00), Max: 36.8 (2019 09:43)  T(F): 98.3 (2019 05:00), Max: 98.3 (2019 09:43)  HR: 76 (2019 07:00) (51 - 96)  BP: 148/86 (2019 07:00) (106/51 - 169/94)  BP(mean): 103 (2019 07:00) (65 - 112)  ABP: --  ABP(mean): --  RR: 16 (2019 07:00) (15 - 23)  SpO2: 96% (2019 07:00) (91% - 100%)          I&O's Summary    30 Dec 2018 07:01  -  31 Dec 2018 07:00  --------------------------------------------------------  IN: 620 mL / OUT: 0 mL / NET: 620 mL        CAPILLARY BLOOD GLUCOSE      POCT Blood Glucose.: 161 mg/dL (30 Dec 2018 23:57)      PHYSICAL EXAM:   Patient in NAD  Neck: No JVD; Carotids:  2+ without bruits  Respiratory:  Clear to A&P  Cardiovascular: S1 and S2, irregular rate and rhythm, no Murmurs, gallops or rubs  Gastrointestinal:  Soft, non-tender; BS positive  Chronic bilateral LE edema/venous stasis changes  Neurological: A/O x 3, no focal deficits      MEDICATIONS  (STANDING):  ascorbic acid 500 milliGRAM(s) Oral daily  aspirin enteric coated 81 milliGRAM(s) Oral daily  atorvastatin 10 milliGRAM(s) Oral at bedtime  buDESOnide    EC Capsule 9 milliGRAM(s) Oral daily  cefepime   IVPB 2000 milliGRAM(s) IV Intermittent every 12 hours  dextrose 5%. 1000 milliLiter(s) (50 mL/Hr) IV Continuous <Continuous>  dextrose 50% Injectable 12.5 Gram(s) IV Push once  dextrose 50% Injectable 25 Gram(s) IV Push once  dextrose 50% Injectable 25 Gram(s) IV Push once  diltiazem    Tablet 120 milliGRAM(s) Oral two times a day  ferrous    sulfate 325 milliGRAM(s) Oral daily  furosemide   Injectable 40 milliGRAM(s) IV Push daily  gabapentin 300 milliGRAM(s) Oral at bedtime  insulin lispro (HumaLOG) corrective regimen sliding scale   SubCutaneous three times a day before meals  metoprolol succinate  milliGRAM(s) Oral daily  montelukast 10 milliGRAM(s) Oral at bedtime  pantoprazole    Tablet 40 milliGRAM(s) Oral before breakfast  potassium chloride    Tablet ER 20 milliEquivalent(s) Oral two times a day  sucralfate 1 Gram(s) Oral four times a day  warfarin 5 milliGRAM(s) Oral daily        Home Medications:  ascorbic acid 500 mg oral tablet: 1 tab(s) orally once a day (30 Dec 2018 20:57)  aspirin 81 mg oral tablet: 1 tab(s) orally once a day (30 Dec 2018 20:57)  Coumadin 5 mg oral tablet: 1 tab(s) orally once a day (30 Dec 2018 20:57)  dilTIAZem 120 mg oral tablet: 1 tab(s) orally 2 times a day (30 Dec 2018 20:57)  docusate sodium 100 mg oral capsule: 1 cap(s) orally once a day, As Needed (30 Dec 2018 20:57)  ferrous sulfate 250 mg oral capsule, extended release: 1 cap(s) orally once a day (30 Dec 2018 20:57)  freetext medication  -: 9 milligram(s) orally once a day (30 Dec 2018 20:57)  gabapentin 300 mg oral capsule: 1 cap(s) orally once a day (at bedtime) (30 Dec 2018 20:57)  metFORMIN 1000 mg oral tablet: 1 tab(s) orally 2 times a day (30 Dec 2018 20:57)  Metoprolol Succinate  mg oral tablet, extended release: 1 tab(s) orally once a day (30 Dec 2018 20:57)  montelukast 10 mg oral tablet: 1 tab(s) orally once a day (at bedtime) (30 Dec 2018 20:57)  potassium chloride 20 mEq oral tablet, extended release: 1 tab(s) orally 2 times a day (30 Dec 2018 20:57)  pravastatin 10 mg oral tablet: 1 tab(s) orally once a day (30 Dec 2018 20:57)  silver sulfADIAZINE 1% topical cream: 1 application topically once a day (30 Dec 2018 20:57)  Uceris 9 mg oral tablet, extended release: 1 tab(s) orally once a day (in the morning) (30 Dec 2018 20:57)        LABS: All Labs Reviewed:  Blood Culture:   BNP   CBC             WBC Count: 9.11 K/uL ( @ 06:04)  WBC Count: 9.94 K/uL ( @ 11:45)              Hemoglobin: 10.7 g/dL (:04)  Hemoglobin: 11.8 g/dL (:45)              Hematocrit: 32.4 % (:04)  Hematocrit: 35.8 % (:45)              Mean Cell Volume: 92.0 fl (:04)  Mean Cell Volume: 89.9 fl (:45)              Platelet Count - Automated: 416 K/uL ( 06:04)  Platelet Count - Automated: 435 K/uL (:45)                        11.0   9.79  )-----------( 455      ( 2019 06:09 )             34.5                               Cardiac markers             Troponin I, Serum: <0.015 ng/mL ( @ 11:45)                             [place]ms        Sodium, Serum: 141 mmol/L ( 06:04)  Sodium, Serum: 138 mmol/L ( 11:45)          Potassium, Serum: 3.7 mmol/L ( 06:04)  Potassium, Serum: 3.2 mmol/L ( 11:45)          Blood Urea Nitrogen, Serum: 10 mg/dL ( 06:04)  Blood Urea Nitrogen, Serum: 7 mg/dL ( 11:45)          Creatinine 0.44  Creatinine 0.68          Magnesium, Serum: 1.2 mg/dL ( 06:04)          Protein Total, Serum: 6.6 gm/dL ( 06:04)  Protein Total, Serum: 7.2 gm/dL (:45)                  Calcium, Total Serum: 7.4 mg/dL ( 06:04)  Calcium, Total Serum: 7.8 mg/dL (:45)          Phosphorus Level, Serum: 2.9 mg/dL ( @ 06:04)          Bilirubin Total, Serum: 0.5 mg/dL ( 06:04)  Bilirubin Total, Serum: 0.6 mg/dL ( @ 11:45)          Alanine Aminotransferase (ALT/SGPT): 22 U/L ( @ 06:04)  Alanine Aminotransferase (ALT/SGPT): 26 U/L ( @ 11:45)          Aspartate Aminotransferase (AST/SGOT): 12 U/L ( @ 06:04)  Aspartate Aminotransferase (AST/SGOT): 13 U/L ( @ 11:45)        140  |  106  |  9   ----------------------------<  147<H>  4.0   |  26  |  0.35<L>    Ca    8.2<L>      2019 06:09  Phos  2.6       Mg     1.9         TPro  6.6  /  Alb  2.0<L>  /  TBili  0.5  /  DBili  x   /  AST  12<L>  /  ALT  22  /  AlkPhos  70        Rapid RVP Result: NotDete: This Respiratory Panel uses polymerase chain reaction (PCR) to detect for  adenovirus; coronavirus (HKU1, NL63, 229E, OC43); human metapneumovirus  (hMPV); human enterovirus/rhinovirus (Entero/RV); influenza A; influenza  A/H1; influenza A/H3; influenza A/H1-2009; influenza B; parainfluenza  viruses 1, 2, 3, 4; respiratory syncytial virus; Mycoplasma pneumoniae;  and Chlamydophila pneumoniae. (18 @ 15:13)             Prothrombin Time and INR, Plasma in AM (19 @ 05:52)    Prothrombin Time, Plasma: 27.7: Effective 2018 the reference range for PT has changed. sec    INR: 2.43 ratio      INR: 1.96 ratio ( @ 06:04)  INR: 2.59 ratio ( @ 11:45)         RADIOLOGY:  < from: CT Chest No Cont (18 @ 21:21) >  IMPRESSION:   1. Since the prior study of 18, there has been the development of   groundglass nodular opacities throughout the right upper lobe with   ill-defined   groundglass opacities right middle lobe and to some degree right lower   lobe.   Some haziness noted possibly due to mild interstitial edema versus motion   artifact. Subpleural emphysematous changes posterior right lower lobe are   unchanged.   Left lung demonstrates mild haziness likely due to motion artifact and/or   small   amount of interstitial edema. Minimal atelectasis lingula portion left   lung.   Appearance of left lung grossly unchanged.  2. As on the prior study 18, nodule is noted measuring 1.5 cm   extending   posteriorly from right lobe of thyroid gland with substernal extension   similar   to prior study.   3.Remainder of the study is nonacute and stable.      < end of copied text >      EKG:  Atrial fibrillation with a rapid VR    Telemetry: Atrial fibrillation with a controlled VR    ECHO:  < from: Transthoracic Echocardiogram Follow Up (18 @ 13:50) >  Summary     Limited study to asses pericardial effusion.   Estimated left ventricular ejection fraction is 55-60 %.   No evidence of pericardial effusion.    < end of copied text >

## 2019-01-03 NOTE — PROGRESS NOTE ADULT - SUBJECTIVE AND OBJECTIVE BOX
Date of service: 01-03-19 @ 09:30    OOB to chair  Feels weak  SOB with light exercise    ROS: no fever or chills; denies dizziness, no HA, no abdominal pain, no diarrhea or constipation; no dysuria, no legs pain, no rashes    MEDICATIONS  (STANDING):  ascorbic acid 500 milliGRAM(s) Oral daily  aspirin enteric coated 81 milliGRAM(s) Oral daily  atorvastatin 10 milliGRAM(s) Oral at bedtime  buDESOnide    EC Capsule 9 milliGRAM(s) Oral daily  cefepime   IVPB 2000 milliGRAM(s) IV Intermittent every 12 hours  dextrose 5%. 1000 milliLiter(s) (50 mL/Hr) IV Continuous <Continuous>  dextrose 50% Injectable 12.5 Gram(s) IV Push once  dextrose 50% Injectable 25 Gram(s) IV Push once  dextrose 50% Injectable 25 Gram(s) IV Push once  diltiazem    Tablet 120 milliGRAM(s) Oral two times a day  ferrous    sulfate 325 milliGRAM(s) Oral daily  furosemide   Injectable 40 milliGRAM(s) IV Push daily  gabapentin 300 milliGRAM(s) Oral at bedtime  insulin lispro (HumaLOG) corrective regimen sliding scale   SubCutaneous three times a day before meals  metoprolol succinate  milliGRAM(s) Oral daily  montelukast 10 milliGRAM(s) Oral at bedtime  pantoprazole    Tablet 40 milliGRAM(s) Oral before breakfast  potassium chloride    Tablet ER 20 milliEquivalent(s) Oral two times a day  sucralfate 1 Gram(s) Oral four times a day  warfarin 5 milliGRAM(s) Oral daily      Vital Signs Last 24 Hrs  T(C): 36.8 (03 Jan 2019 05:00), Max: 36.8 (02 Jan 2019 09:43)  T(F): 98.3 (03 Jan 2019 05:00), Max: 98.3 (02 Jan 2019 09:43)  HR: 69 (03 Jan 2019 08:00) (51 - 96)  BP: 148/86 (03 Jan 2019 07:00) (118/63 - 169/94)  BP(mean): 103 (03 Jan 2019 07:00) (67 - 112)  RR: 19 (03 Jan 2019 08:00) (15 - 23)  SpO2: 98% (03 Jan 2019 08:00) (91% - 100%)    Physical Exam:      Constitutional: frail looking  HEENT: NC/AT, EOMI, PERRLA, conjunctivae clear  Neck: supple; thyroid not palpable  Back: no tenderness  Respiratory: respiratory effort normal; decreased BS at bases; few crackles at bases  Cardiovascular: S1S2 regular, no murmurs  Abdomen: soft, not tender, not distended, positive BS  Genitourinary: no suprapubic tenderness  Lymphatic: no LN palpable  Musculoskeletal: no muscle tenderness, no joint swelling or tenderness  Extremities: no pedal edema  Neurological/ Psychiatric: AxOx3, moving all extremities  Skin: no rashes; no palpable lesions    Labs: reviewed                        11.7   11.27 )-----------( 519      ( 03 Jan 2019 04:58 )             36.9     01-03    143  |  107  |  14  ----------------------------<  123<H>  3.7   |  28  |  0.46<L>    Ca    8.5      03 Jan 2019 04:58                        10.7   9.11  )-----------( 416      ( 31 Dec 2018 06:04 )             32.4     12-31    141  |  106  |  10  ----------------------------<  122<H>  3.7   |  26  |  0.44<L>    Ca    7.4<L>      31 Dec 2018 06:04  Phos  2.9     12-31  Mg     1.2     12-31    TPro  6.6  /  Alb  2.0<L>  /  TBili  0.5  /  DBili  x   /  AST  12<L>  /  ALT  22  /  AlkPhos  70  12-31     LIVER FUNCTIONS - ( 31 Dec 2018 06:04 )  Alb: 2.0 g/dL / Pro: 6.6 gm/dL / ALK PHOS: 70 U/L / ALT: 22 U/L / AST: 12 U/L / GGT: x             Culture - Blood (collected 30 Dec 2018 11:45)  Source: .Blood None  Preliminary Report (31 Dec 2018 15:01):    No growth to date.    Culture - Blood (collected 30 Dec 2018 11:45)  Source: .Blood None  Preliminary Report (31 Dec 2018 15:01):    No growth to date.        Radiology: all available radiological tests reviewed    < from: CT Chest No Cont (12.30.18 @ 21:21) >  Several groundglass opacities identified within the right   upper lobe, likely reflecting pneumonia given the clinical context. Rest   of the chronic findings as above.    < end of copied text >      Advanced directives addressed: full resuscitation

## 2019-01-03 NOTE — PROGRESS NOTE ADULT - SUBJECTIVE AND OBJECTIVE BOX
Patient is a 68y old  Female who presents with a chief complaint of c/o fever and SOB (02 Jan 2019 15:08)      HPI:  pt still with JETT  no diarrhea  dysphagia improving on carafate suspension    PAST MEDICAL & SURGICAL HISTORY:  HTN (hypertension)  Thyroid nodule  Peripheral venous insufficiency  Neuropathy  Morbid obesity with BMI of 40.0-44.9, adult  Dyspnea  Congestive heart failure  Atrial fibrillation  Diabetes mellitus type II, controlled  Status post medial meniscus repair  S/P left knee arthroscopy  Other complications of gastric band procedure  H/O colonoscopy  History of esophagogastroduodenoscopy (EGD)    MEDICATIONS  (STANDING):  ascorbic acid 500 milliGRAM(s) Oral daily  aspirin enteric coated 81 milliGRAM(s) Oral daily  atorvastatin 10 milliGRAM(s) Oral at bedtime  buDESOnide    EC Capsule 9 milliGRAM(s) Oral daily  cefepime   IVPB 2000 milliGRAM(s) IV Intermittent every 12 hours  dextrose 5%. 1000 milliLiter(s) (50 mL/Hr) IV Continuous <Continuous>  dextrose 50% Injectable 12.5 Gram(s) IV Push once  dextrose 50% Injectable 25 Gram(s) IV Push once  dextrose 50% Injectable 25 Gram(s) IV Push once  diltiazem    Tablet 120 milliGRAM(s) Oral two times a day  ferrous    sulfate 325 milliGRAM(s) Oral daily  furosemide   Injectable 40 milliGRAM(s) IV Push daily  gabapentin 300 milliGRAM(s) Oral at bedtime  insulin lispro (HumaLOG) corrective regimen sliding scale   SubCutaneous three times a day before meals  metoprolol succinate  milliGRAM(s) Oral daily  montelukast 10 milliGRAM(s) Oral at bedtime  pantoprazole    Tablet 40 milliGRAM(s) Oral before breakfast  potassium chloride    Tablet ER 20 milliEquivalent(s) Oral two times a day  sucralfate 1 Gram(s) Oral four times a day  warfarin 5 milliGRAM(s) Oral daily    MEDICATIONS  (PRN):  acetaminophen   Tablet .. 650 milliGRAM(s) Oral every 6 hours PRN Temp greater or equal to 38C (100.4F), Mild Pain (1 - 3)  dextrose 40% Gel 15 Gram(s) Oral once PRN Blood Glucose LESS THAN 70 milliGRAM(s)/deciliter  glucagon  Injectable 1 milliGRAM(s) IntraMuscular once PRN Glucose LESS THAN 70 milligrams/deciliter  melatonin 5 milliGRAM(s) Oral at bedtime PRN Insomnia  zolpidem 5 milliGRAM(s) Oral at bedtime PRN Insomnia    Allergies  penicillin (Hives)    REVIEW OF SYSTEMS:    CONSTITUTIONAL: weakness  RESPIRATORY: as above  CARDIOVASCULAR: No chest pain or palpitations  GASTROINTESTINAL: as above  All other review of systems is negative unless indicated above.    Vital Signs Last 24 Hrs  T(C): 36.8 (03 Jan 2019 05:00), Max: 36.8 (02 Jan 2019 09:43)  T(F): 98.3 (03 Jan 2019 05:00), Max: 98.3 (02 Jan 2019 09:43)  HR: 88 (03 Jan 2019 06:10) (51 - 96)  BP: 167/84 (03 Jan 2019 06:10) (106/51 - 169/94)  BP(mean): 105 (03 Jan 2019 06:10) (65 - 112)  RR: 15 (03 Jan 2019 04:00) (15 - 23)  SpO2: 93% (03 Jan 2019 06:10) (91% - 100%)    PHYSICAL EXAM:    Constitutional: NAD, on O2  Respiratory: occ rhonchi  Cardiovascular: S1 and S2  Gastrointestinal: BS+, soft, NT/ND      LABS:                        11.7   11.27 )-----------( 519      ( 03 Jan 2019 04:58 )             36.9     01-03    143  |  107  |  14  ----------------------------<  123<H>  3.7   |  28  |  0.46<L>    Ca    8.5      03 Jan 2019 04:58      PT/INR - ( 03 Jan 2019 04:58 )   PT: 28.6 sec;   INR: 2.50 ratio               RADIOLOGY & ADDITIONAL STUDIES:

## 2019-01-03 NOTE — PROGRESS NOTE ADULT - SUBJECTIVE AND OBJECTIVE BOX
c/c: fever/sob.     HPI:  69 yo F with PMH significant for HTN, HLD, CHF HFpEF, Afib on Coumadin, Morbid Obesity, Chronic venous insufficiency,  DM2 presents to the ED c/o fever and SOB x 7 days.  Pt reported that she has been having fever 99-101F at home x 7 days  and exertional dyspnea for the past 2-3 days. Pt reported that she was seen at Dr. Mayer office and was prescribed Doxycycline PO which Pt has been taking for the past 6 days with no significant improvement so came to the ED for eval. On arrival to the ED, Fever 101.1F, Tachycardiac Afib with RVR HR: 140's on arrival , now 's. ON labs, Lactate: 3.1, Pt received IVf Ns 500 cc x 1 in the ED. RVP panel negative. c/o mild dysuria.   She was admitted with rapid a fib and further further workup. Her HR improved with IVF. CT showed RIght sided pna.   Upon further questioning, She does admit to difficulty swallowing liquids and sometimes coughs intermittently with po intake. Seen by GI who felt she had pill esophagitis due to doxycycline.     1/1: pt seen and examined this am. overall feeling better. Still with coronado. no chest pain.  1/2 s/p IV lasix earlier today- feels much better  1/3/19 improving, still on IV diuresis    Review of system- All 10 systems reviewed and is as per HPI otherwise negative.     Vital Signs Last 24 Hrs  T(C): 36.7 (03 Jan 2019 09:48), Max: 36.8 (03 Jan 2019 05:00)  T(F): 98 (03 Jan 2019 09:48), Max: 98.3 (03 Jan 2019 05:00)  HR: 82 (03 Jan 2019 13:00) (69 - 96)  BP: 108/69 (03 Jan 2019 11:00) (108/69 - 169/94)  BP(mean): 78 (03 Jan 2019 11:00) (67 - 112)  RR: 20 (03 Jan 2019 13:00) (15 - 23)  SpO2: 93% (03 Jan 2019 12:00) (91% - 100%)    PHYSICAL EXAM:  GENERAL: Comfortable, Obese,  no acute distress  HEAD:  Atraumatic, Normocephalic  EYES: EOMI, PERRLA  HEENT: Moist mucous membranes  NECK: Supple, No JVD  NERVOUS SYSTEM:  Alert & Oriented X3, non focal  CHEST/LUNG: decreased bs b/l, occasional rhonchi  HEART: s1, s2+, irregular.  ABDOMEN: Soft, Nontender, Nondistended; Bowel sounds present  GENITOURINARY- Voiding, no palpable bladder  EXTREMITIES:  No clubbing, cyanosis. B/l LE edema++. RLE in dressing, LLE with erythema over pretibial area which she reports is chronic.  MUSCULOSKELETAL- normal tone.  SKIN-erythema over LLE pretibial area.     LABS:                        11.7   11.27 )-----------( 519      ( 03 Jan 2019 04:58 )             36.9     03 Jan 2019 04:58    143    |  107    |  14     ----------------------------<  123    3.7     |  28     |  0.46     Ca    8.5        03 Jan 2019 04:58  PT/INR - ( 03 Jan 2019 04:58 )   PT: 28.6 sec;   INR: 2.50 ratio      CAPILLARY BLOOD GLUCOSE  POCT Blood Glucose.: 182 mg/dL (03 Jan 2019 12:26)  POCT Blood Glucose.: 123 mg/dL (03 Jan 2019 08:36)  POCT Blood Glucose.: 204 mg/dL (02 Jan 2019 21:39)  POCT Blood Glucose.: 160 mg/dL (02 Jan 2019 17:40)    MEDICATIONS  (STANDING):  ascorbic acid 500 milliGRAM(s) Oral daily  aspirin enteric coated 81 milliGRAM(s) Oral daily  atorvastatin 10 milliGRAM(s) Oral at bedtime  buDESOnide    EC Capsule 9 milliGRAM(s) Oral daily  cefepime   IVPB 2000 milliGRAM(s) IV Intermittent every 12 hours  dextrose 5%. 1000 milliLiter(s) (50 mL/Hr) IV Continuous <Continuous>  dextrose 50% Injectable 12.5 Gram(s) IV Push once  dextrose 50% Injectable 25 Gram(s) IV Push once  dextrose 50% Injectable 25 Gram(s) IV Push once  diltiazem    Tablet 120 milliGRAM(s) Oral two times a day  ferrous    sulfate 325 milliGRAM(s) Oral daily  furosemide   Injectable 40 milliGRAM(s) IV Push daily  gabapentin 300 milliGRAM(s) Oral at bedtime  insulin lispro (HumaLOG) corrective regimen sliding scale   SubCutaneous three times a day before meals  metoprolol succinate  milliGRAM(s) Oral daily  montelukast 10 milliGRAM(s) Oral at bedtime  pantoprazole    Tablet 40 milliGRAM(s) Oral before breakfast  potassium chloride    Tablet ER 20 milliEquivalent(s) Oral two times a day  sucralfate 1 Gram(s) Oral four times a day  warfarin 5 milliGRAM(s) Oral daily    MEDICATIONS  (PRN):  acetaminophen   Tablet .. 650 milliGRAM(s) Oral every 6 hours PRN Temp greater or equal to 38C (100.4F), Mild Pain (1 - 3)  dextrose 40% Gel 15 Gram(s) Oral once PRN Blood Glucose LESS THAN 70 milliGRAM(s)/deciliter  glucagon  Injectable 1 milliGRAM(s) IntraMuscular once PRN Glucose LESS THAN 70 milligrams/deciliter  melatonin 5 milliGRAM(s) Oral at bedtime PRN Insomnia  zolpidem 5 milliGRAM(s) Oral at bedtime PRN Insomnia    ASSESSMENT AND PLAN:  #Sepsis due to multifocal PNA, poss GNR  ID f/u appreciated  Cont IV abx  Repeat CXR shows persistent infiltrate    #Acute on chronic diastolic CHF  Cardio f/u appreciated  Cont IV Lasix for now    #Dysphagia:  -GI eval appreciated  -likely pill esophagitis  -PPI+carafate.  -outpt f/u    #Afib with RVR on arrival to the ED  -HR better   -continue ccb/bb  -dose coumadin to goal inr 2-3.    #RLE ulcer due to venous stasic.  -Weekly dressing change while inpt. Wound care with silvadene, collagenase and compression dressing per vascular    #DMII:  -hold OHA  -SS  -sugars acceptable    #Hx of UC  -on budesonide    #HTN/hyperlipidemia  Cont current meds    #Dispo- can transfer to med-surg. Ambulate. D/w pt and  at bedside

## 2019-01-04 ENCOUNTER — TRANSCRIPTION ENCOUNTER (OUTPATIENT)
Age: 69
End: 2019-01-04

## 2019-01-04 VITALS — WEIGHT: 282.63 LBS

## 2019-01-04 LAB
ANION GAP SERPL CALC-SCNC: 7 MMOL/L — SIGNIFICANT CHANGE UP (ref 5–17)
BUN SERPL-MCNC: 16 MG/DL — SIGNIFICANT CHANGE UP (ref 7–23)
CALCIUM SERPL-MCNC: 8.8 MG/DL — SIGNIFICANT CHANGE UP (ref 8.5–10.1)
CHLORIDE SERPL-SCNC: 104 MMOL/L — SIGNIFICANT CHANGE UP (ref 96–108)
CO2 SERPL-SCNC: 27 MMOL/L — SIGNIFICANT CHANGE UP (ref 22–31)
CREAT SERPL-MCNC: 0.57 MG/DL — SIGNIFICANT CHANGE UP (ref 0.5–1.3)
CULTURE RESULTS: SIGNIFICANT CHANGE UP
CULTURE RESULTS: SIGNIFICANT CHANGE UP
GLUCOSE BLDC GLUCOMTR-MCNC: 121 MG/DL — HIGH (ref 70–99)
GLUCOSE BLDC GLUCOMTR-MCNC: 199 MG/DL — HIGH (ref 70–99)
GLUCOSE SERPL-MCNC: 153 MG/DL — HIGH (ref 70–99)
INR BLD: 2.55 RATIO — HIGH (ref 0.88–1.16)
POTASSIUM SERPL-MCNC: 4.1 MMOL/L — SIGNIFICANT CHANGE UP (ref 3.5–5.3)
POTASSIUM SERPL-SCNC: 4.1 MMOL/L — SIGNIFICANT CHANGE UP (ref 3.5–5.3)
PROTHROM AB SERPL-ACNC: 29.1 SEC — HIGH (ref 10–12.9)
SODIUM SERPL-SCNC: 138 MMOL/L — SIGNIFICANT CHANGE UP (ref 135–145)
SPECIMEN SOURCE: SIGNIFICANT CHANGE UP
SPECIMEN SOURCE: SIGNIFICANT CHANGE UP

## 2019-01-04 PROCEDURE — 71045 X-RAY EXAM CHEST 1 VIEW: CPT | Mod: 26

## 2019-01-04 RX ORDER — CEFUROXIME AXETIL 250 MG
500 TABLET ORAL EVERY 12 HOURS
Qty: 0 | Refills: 0 | Status: DISCONTINUED | OUTPATIENT
Start: 2019-01-04 | End: 2019-01-04

## 2019-01-04 RX ORDER — CEFUROXIME AXETIL 250 MG
1 TABLET ORAL
Qty: 14 | Refills: 0
Start: 2019-01-04 | End: 2019-01-10

## 2019-01-04 RX ADMIN — CEFEPIME 100 MILLIGRAM(S): 1 INJECTION, POWDER, FOR SOLUTION INTRAMUSCULAR; INTRAVENOUS at 06:23

## 2019-01-04 RX ADMIN — Medication 40 MILLIGRAM(S): at 11:39

## 2019-01-04 RX ADMIN — PANTOPRAZOLE SODIUM 40 MILLIGRAM(S): 20 TABLET, DELAYED RELEASE ORAL at 06:23

## 2019-01-04 RX ADMIN — Medication 81 MILLIGRAM(S): at 11:39

## 2019-01-04 RX ADMIN — Medication 1: at 11:41

## 2019-01-04 RX ADMIN — Medication 500 MILLIGRAM(S): at 11:39

## 2019-01-04 RX ADMIN — Medication 20 MILLIEQUIVALENT(S): at 06:23

## 2019-01-04 RX ADMIN — Medication 9 MILLIGRAM(S): at 06:24

## 2019-01-04 RX ADMIN — Medication 100 MILLIGRAM(S): at 06:23

## 2019-01-04 RX ADMIN — Medication 1 GRAM(S): at 11:39

## 2019-01-04 RX ADMIN — Medication 325 MILLIGRAM(S): at 11:39

## 2019-01-04 RX ADMIN — Medication 1 GRAM(S): at 06:23

## 2019-01-04 NOTE — DISCHARGE NOTE ADULT - CARE PLAN
Principal Discharge DX:	Pneumonia  Goal:	complete PO antibiotics  Assessment and plan of treatment:	Outpatient f/u

## 2019-01-04 NOTE — DISCHARGE NOTE ADULT - HOSPITAL COURSE
Patient admitted with pneumonia, treated with IV antibiotics, improved. Also had acute on chronic CHF requiring IV lasix. Now better and DC home. Outpatient f/u with PCP DR Mayer

## 2019-01-04 NOTE — PROGRESS NOTE ADULT - SUBJECTIVE AND OBJECTIVE BOX
Date of service: 01-04-19 @ 12:32    Lying in bed in NAD  Feels stronger  No SOB at rest  Cough is much improved    ROS: no fever or chills; denies dizziness, no HA, no abdominal pain, no diarrhea or constipation; no dysuria, no legs pain, no rashes    MEDICATIONS  (STANDING):  ascorbic acid 500 milliGRAM(s) Oral daily  aspirin enteric coated 81 milliGRAM(s) Oral daily  atorvastatin 10 milliGRAM(s) Oral at bedtime  buDESOnide    EC Capsule 9 milliGRAM(s) Oral daily  cefepime   IVPB 2000 milliGRAM(s) IV Intermittent every 12 hours  dextrose 5%. 1000 milliLiter(s) (50 mL/Hr) IV Continuous <Continuous>  dextrose 50% Injectable 12.5 Gram(s) IV Push once  dextrose 50% Injectable 25 Gram(s) IV Push once  dextrose 50% Injectable 25 Gram(s) IV Push once  diltiazem    Tablet 120 milliGRAM(s) Oral two times a day  ferrous    sulfate 325 milliGRAM(s) Oral daily  furosemide   Injectable 40 milliGRAM(s) IV Push daily  gabapentin 300 milliGRAM(s) Oral at bedtime  insulin lispro (HumaLOG) corrective regimen sliding scale   SubCutaneous three times a day before meals  metoprolol succinate  milliGRAM(s) Oral daily  montelukast 10 milliGRAM(s) Oral at bedtime  pantoprazole    Tablet 40 milliGRAM(s) Oral before breakfast  potassium chloride    Tablet ER 20 milliEquivalent(s) Oral two times a day  sucralfate 1 Gram(s) Oral four times a day  warfarin 5 milliGRAM(s) Oral daily      Vital Signs Last 24 Hrs  T(C): 36.6 (04 Jan 2019 11:18), Max: 37.1 (03 Jan 2019 16:31)  T(F): 97.9 (04 Jan 2019 11:18), Max: 98.7 (03 Jan 2019 16:31)  HR: 65 (04 Jan 2019 11:18) (65 - 98)  BP: 126/66 (04 Jan 2019 11:18) (117/67 - 159/85)  BP(mean): 96 (03 Jan 2019 19:14) (82 - 97)  RR: 18 (04 Jan 2019 11:18) (18 - 27)  SpO2: 99% (04 Jan 2019 11:18) (93% - 99%)    Physical Exam:      Constitutional: frail looking  HEENT: NC/AT, EOMI, PERRLA, conjunctivae clear  Neck: supple; thyroid not palpable  Back: no tenderness  Respiratory: respiratory effort normal; decreased BS at bases; few crackles at bases  Cardiovascular: S1S2 regular, no murmurs  Abdomen: soft, not tender, not distended, positive BS  Genitourinary: no suprapubic tenderness  Lymphatic: no LN palpable  Musculoskeletal: no muscle tenderness, no joint swelling or tenderness  Extremities: no pedal edema  Neurological/ Psychiatric: AxOx3, moving all extremities  Skin: no rashes; no palpable lesions    Labs: reviewed                        11.7 11.27 )-----------( 519      ( 03 Jan 2019 04:58 )             36.9     01-03    143  |  107  |  14  ----------------------------<  123<H>  3.7   |  28  |  0.46<L>    Ca    8.5      03 Jan 2019 04:58                        10.7   9.11  )-----------( 416      ( 31 Dec 2018 06:04 )             32.4     12-31    141  |  106  |  10  ----------------------------<  122<H>  3.7   |  26  |  0.44<L>    Ca    7.4<L>      31 Dec 2018 06:04  Phos  2.9     12-31  Mg     1.2     12-31    TPro  6.6  /  Alb  2.0<L>  /  TBili  0.5  /  DBili  x   /  AST  12<L>  /  ALT  22  /  AlkPhos  70  12-31     LIVER FUNCTIONS - ( 31 Dec 2018 06:04 )  Alb: 2.0 g/dL / Pro: 6.6 gm/dL / ALK PHOS: 70 U/L / ALT: 22 U/L / AST: 12 U/L / GGT: x             Culture - Blood (collected 30 Dec 2018 11:45)  Source: .Blood None  Preliminary Report (31 Dec 2018 15:01):    No growth to date.    Culture - Blood (collected 30 Dec 2018 11:45)  Source: .Blood None  Preliminary Report (31 Dec 2018 15:01):    No growth to date.        Radiology: all available radiological tests reviewed    < from: CT Chest No Cont (12.30.18 @ 21:21) >  Several groundglass opacities identified within the right   upper lobe, likely reflecting pneumonia given the clinical context. Rest   of the chronic findings as above.    < end of copied text >      Advanced directives addressed: full resuscitation

## 2019-01-04 NOTE — PROGRESS NOTE ADULT - REASON FOR ADMISSION
c/o fever and SOB

## 2019-01-04 NOTE — PROGRESS NOTE ADULT - ASSESSMENT
69 y/o Female with h/o HTN, HLD, CHF HFpEF, A.fib on Coumadin, morbid Obesity, chronic venous insufficiency, recurrent legs cellulitis, DM2 was admitted on 12/30 for fever and SOB x 7 days. Pt reported that she has been having fever to 99-101F at home x 7 days  and exertional dyspnea for the past 2-3 days. Pt reported that she was seen at Dr. Mayer office and was prescribed Doxycycline PO for the past 6 days PTA with no significant improvement. On arrival to the ED, she had fever to 101.1F, tachycardiac Afib with RVR. She received levofloxacin.    1. RUL pneumonia resolving. Allergy to PCN.  -dyspnea is improved  -mild leukocytosis  -dysphagia ?related to "pills esophagitis"  -BC x 2 are negative to date  -on cefepime 2 gm IV q12h # 5  -tolerating abx well so far; no side effects noted  -monitor closely in ej of PCN allergy history  -change abx to ceftin 500 mg PO q12h for 7 more days  -monitor temps  -f/u CBC  -supportive care  2. Other issues:   -care per medicine

## 2019-01-04 NOTE — PROGRESS NOTE ADULT - SUBJECTIVE AND OBJECTIVE BOX
Wound Care Instructions:  Dressings to be removed, legs rinsed with Normal saline  RLE: Xeroform dressing applied to open venous stasis wounds, Abd pads, and Kerlix dressing wrapped with Ace bandage  LLE: No open wounds, apply compression dressings with Ace bandage

## 2019-01-04 NOTE — PROGRESS NOTE ADULT - SUBJECTIVE AND OBJECTIVE BOX
c/c: fever/sob.     HPI:  67 yo F with PMH significant for HTN, HLD, CHF HFpEF, Afib on Coumadin, Morbid Obesity, Chronic venous insufficiency,  DM2 presents to the ED c/o fever and SOB x 7 days.  Pt reported that she has been having fever 99-101F at home x 7 days  and exertional dyspnea for the past 2-3 days. Pt reported that she was seen at Dr. Mayer office and was prescribed Doxycycline PO which Pt has been taking for the past 6 days with no significant improvement so came to the ED for eval. On arrival to the ED, Fever 101.1F, Tachycardiac Afib with RVR HR: 140's on arrival , now 's. ON labs, Lactate: 3.1, Pt received IVf Ns 500 cc x 1 in the ED. RVP panel negative. c/o mild dysuria.   She was admitted with rapid a fib and further further workup. Her HR improved with IVF. CT showed RIght sided pna.   Upon further questioning, She does admit to difficulty swallowing liquids and sometimes coughs intermittently with po intake. Seen by GI who felt she had pill esophagitis due to doxycycline.     1/1: pt seen and examined this am. overall feeling better. Still with coronado. no chest pain.  1/2 s/p IV lasix earlier today- feels much better  1/3/19 improving, still on IV diuresis  1/4/19 doing better, wants to go home    Review of system- All 10 systems reviewed and is as per HPI otherwise negative.     Vital Signs Last 24 Hrs  T(C): 36.6 (04 Jan 2019 11:18), Max: 37.1 (03 Jan 2019 16:31)  T(F): 97.9 (04 Jan 2019 11:18), Max: 98.7 (03 Jan 2019 16:31)  HR: 65 (04 Jan 2019 11:18) (65 - 98)  BP: 126/66 (04 Jan 2019 11:18) (117/67 - 159/85)  BP(mean): 96 (03 Jan 2019 19:14) (87 - 97)  RR: 18 (04 Jan 2019 11:18) (18 - 27)  SpO2: 99% (04 Jan 2019 11:18) (93% - 99%)    PHYSICAL EXAM:  GENERAL: Comfortable, Obese,  no acute distress  HEAD:  Atraumatic, Normocephalic  EYES: EOMI, PERRLA  HEENT: Moist mucous membranes  NECK: Supple, No JVD  NERVOUS SYSTEM:  Alert & Oriented X3, non focal  CHEST/LUNG: decreased bs b/l, occasional rhonchi  HEART: s1, s2+, irregular.  ABDOMEN: Soft, Nontender, Nondistended; Bowel sounds present  GENITOURINARY- Voiding, no palpable bladder  EXTREMITIES:  No clubbing, cyanosis. B/l LE edema++. RLE in dressing, LLE with erythema over pretibial area which she reports is chronic.  MUSCULOSKELETAL- normal tone.  SKIN-erythema over LLE pretibial area.     LABS:                                   11.7   11.27 )-----------( 519      ( 03 Jan 2019 04:58 )             36.9     138    |  104    |  16     ----------------------------<  153    4.1     |  27     |  0.57     Ca    8.8        04 Jan 2019 12:49    PT/INR - ( 04 Jan 2019 08:00 )   PT: 29.1 sec;   INR: 2.55 ratio      CAPILLARY BLOOD GLUCOSE  POCT Blood Glucose.: 199 mg/dL (04 Jan 2019 11:41)  POCT Blood Glucose.: 121 mg/dL (04 Jan 2019 07:24)  POCT Blood Glucose.: 145 mg/dL (03 Jan 2019 23:30)  POCT Blood Glucose.: 120 mg/dL (03 Jan 2019 17:35)    MEDICATIONS  (STANDING):  ascorbic acid 500 milliGRAM(s) Oral daily  aspirin enteric coated 81 milliGRAM(s) Oral daily  atorvastatin 10 milliGRAM(s) Oral at bedtime  buDESOnide    EC Capsule 9 milliGRAM(s) Oral daily  cefepime   IVPB 2000 milliGRAM(s) IV Intermittent every 12 hours  dextrose 5%. 1000 milliLiter(s) (50 mL/Hr) IV Continuous <Continuous>  dextrose 50% Injectable 12.5 Gram(s) IV Push once  dextrose 50% Injectable 25 Gram(s) IV Push once  dextrose 50% Injectable 25 Gram(s) IV Push once  diltiazem    Tablet 120 milliGRAM(s) Oral two times a day  ferrous    sulfate 325 milliGRAM(s) Oral daily  furosemide   Injectable 40 milliGRAM(s) IV Push daily  gabapentin 300 milliGRAM(s) Oral at bedtime  insulin lispro (HumaLOG) corrective regimen sliding scale   SubCutaneous three times a day before meals  metoprolol succinate  milliGRAM(s) Oral daily  montelukast 10 milliGRAM(s) Oral at bedtime  pantoprazole    Tablet 40 milliGRAM(s) Oral before breakfast  potassium chloride    Tablet ER 20 milliEquivalent(s) Oral two times a day  sucralfate 1 Gram(s) Oral four times a day  warfarin 5 milliGRAM(s) Oral daily    MEDICATIONS  (PRN):  acetaminophen   Tablet .. 650 milliGRAM(s) Oral every 6 hours PRN Temp greater or equal to 38C (100.4F), Mild Pain (1 - 3)  dextrose 40% Gel 15 Gram(s) Oral once PRN Blood Glucose LESS THAN 70 milliGRAM(s)/deciliter  glucagon  Injectable 1 milliGRAM(s) IntraMuscular once PRN Glucose LESS THAN 70 milligrams/deciliter  melatonin 5 milliGRAM(s) Oral at bedtime PRN Insomnia  zolpidem 5 milliGRAM(s) Oral at bedtime PRN Insomnia    ASSESSMENT AND PLAN:  #Sepsis due to multifocal PNA, poss GNR  ID f/u appreciated  Switch to PO Ceftin x7 days on discharge    #Acute on chronic diastolic CHF  Cardio f/u appreciated  Acute component resolved- switch to home dose of PO Lasix    #Dysphagia:  -GI eval appreciated  -likely pill esophagitis  -PPI+carafate.  -outpt f/u    #Afib with RVR on arrival to the ED  -HR better   -continue ccb/bb  -dose coumadin to goal inr 2-3.    #RLE ulcer due to venous stasic.  -Weekly dressing change while inpt. Wound care with silvadene, collagenase and compression dressing per vascular    #DMII:  -hold OHA  -SS  -sugars acceptable    #Hx of UC  -on budesonide    #HTN/hyperlipidemia  Cont current meds    #Dispo- home today. DC time 40 mins

## 2019-01-04 NOTE — DISCHARGE NOTE ADULT - MEDICATION SUMMARY - MEDICATIONS TO TAKE
I will START or STAY ON the medications listed below when I get home from the hospital:    Uceris 9 mg oral tablet, extended release  -- 1 tab(s) by mouth once a day (in the morning)  -- Indication: For colitis    aspirin 81 mg oral tablet  -- 1 tab(s) by mouth once a day  -- Indication: For cardiac    dilTIAZem 120 mg oral tablet  -- 1 tab(s) by mouth 2 times a day  -- Indication: For cardiac    Coumadin 5 mg oral tablet  -- 1 tab(s) by mouth once a day (at bedtime)  -- Indication: For anticoagulation    gabapentin 300 mg oral capsule  -- 1 cap(s) by mouth once a day (at bedtime)  -- Indication: For neuropathy    metFORMIN 1000 mg oral tablet  -- 1 tab(s) by mouth 2 times a day  -- Indication: For diabetes    pravastatin 10 mg oral tablet  -- 1 tab(s) by mouth once a day  -- Indication: For cholesterol    Metoprolol Succinate  mg oral tablet, extended release  -- 1 tab(s) by mouth once a day  -- Indication: For HTN    cefuroxime 500 mg oral tablet  -- 1 tab(s) by mouth every 12 hours  -- Indication: For antibiotic    Lasix 80 mg oral tablet  -- 1 tab(s) by mouth every 12 hours  -- Indication: For CHF    ferrous sulfate 250 mg oral capsule, extended release  -- 1 cap(s) by mouth once a day  -- Indication: For supplement    montelukast 10 mg oral tablet  -- 1 tab(s) by mouth once a day (at bedtime)  -- Indication: For home med    potassium chloride 20 mEq oral tablet, extended release  -- 1 tab(s) by mouth 2 times a day  -- Indication: For supplement    ascorbic acid 500 mg oral tablet  -- 1 tab(s) by mouth once a day  -- Indication: For vitamin

## 2019-01-04 NOTE — PROGRESS NOTE ADULT - SUBJECTIVE AND OBJECTIVE BOX
Patient is a 68y old  Female who presents with a chief complaint of c/o fever and SOB (2019 13:34)    HPI:  69 yo F with PMH significant for HTN, HLD, CHF HFpEF, Afib on Coumadin, Morbid Obesity, Chronic venous insufficiency,  DM2 presents to the ED c/o fever and SOB x 7 days.     Pt reported that she has been having fever 99-101F at home x 7 days  and exertional dyspnea for the past 2-3 days. Pt reported that she was seen at Dr. Mayer office and was prescribed Doxycycline PO which Pt has been taking for the past 6 days with no significant improvement so came to the ED for eval. On arrival to the ED, Fever 101.1F, Tachycardiac Afib with RVR HR: 140's on arrival , now 's. ON labs, Lactate: 3.1, Pt received IVf Ns 500 cc x 1 in the ED. RVP panel negative. c/o mild dysuria.  Denies headache dizziness, chest pain, palpitation or SOB at rest. Denies cough, diarrhea, abdominal pain, N/V. Denies recent travel. Former smoker, quit about 30 + yrs ago.     2019-  Pt reports feeling better.   Less SOB on exertion.   Tolerating diet without any difficulty.   Wants to go home.     Family h/o: Pt reported that Mother had hx of Breast Ca  in her 80's.   Social h/o: Former smoker, quit about 30 + yrs ago, Denies drinking alcohol. (30 Dec 2018 19:55)    PAST MEDICAL & SURGICAL HISTORY:  HTN (hypertension)  Thyroid nodule  Peripheral venous insufficiency  Neuropathy  Morbid obesity with BMI of 40.0-44.9, adult  Dyspnea  Congestive heart failure  Atrial fibrillation  Diabetes mellitus type II, controlled  Status post medial meniscus repair  S/P left knee arthroscopy  Other complications of gastric band procedure  H/O colonoscopy  History of esophagogastroduodenoscopy (EGD)    MEDICATIONS  (STANDING):  ascorbic acid 500 milliGRAM(s) Oral daily  aspirin enteric coated 81 milliGRAM(s) Oral daily  atorvastatin 10 milliGRAM(s) Oral at bedtime  buDESOnide    EC Capsule 9 milliGRAM(s) Oral daily  cefepime   IVPB 2000 milliGRAM(s) IV Intermittent every 12 hours  dextrose 5%. 1000 milliLiter(s) (50 mL/Hr) IV Continuous <Continuous>  dextrose 50% Injectable 12.5 Gram(s) IV Push once  dextrose 50% Injectable 25 Gram(s) IV Push once  dextrose 50% Injectable 25 Gram(s) IV Push once  diltiazem    Tablet 120 milliGRAM(s) Oral two times a day  ferrous    sulfate 325 milliGRAM(s) Oral daily  furosemide   Injectable 40 milliGRAM(s) IV Push daily  gabapentin 300 milliGRAM(s) Oral at bedtime  insulin lispro (HumaLOG) corrective regimen sliding scale   SubCutaneous three times a day before meals  metoprolol succinate  milliGRAM(s) Oral daily  montelukast 10 milliGRAM(s) Oral at bedtime  pantoprazole    Tablet 40 milliGRAM(s) Oral before breakfast  potassium chloride    Tablet ER 20 milliEquivalent(s) Oral two times a day  sucralfate 1 Gram(s) Oral four times a day  warfarin 5 milliGRAM(s) Oral daily    MEDICATIONS  (PRN):  acetaminophen   Tablet .. 650 milliGRAM(s) Oral every 6 hours PRN Temp greater or equal to 38C (100.4F), Mild Pain (1 - 3)  dextrose 40% Gel 15 Gram(s) Oral once PRN Blood Glucose LESS THAN 70 milliGRAM(s)/deciliter  glucagon  Injectable 1 milliGRAM(s) IntraMuscular once PRN Glucose LESS THAN 70 milligrams/deciliter  melatonin 5 milliGRAM(s) Oral at bedtime PRN Insomnia  zolpidem 5 milliGRAM(s) Oral at bedtime PRN Insomnia    Allergies  penicillin (Hives)    FAMILY HISTORY:  Family history of diabetes mellitus in mother (Mother)  Family history of breast cancer in mother (Mother)    REVIEW OF SYSTEMS:  CONSTITUTIONAL: No weakness, fevers or chills  RESPIRATORY: No cough, wheezing, hemoptysis; Very mild shortness of breath exertion, much improved.   CARDIOVASCULAR: No chest pain or palpitations  GASTROINTESTINAL: No abdominal or epigastric pain. No nausea, vomiting, or hematemesis; No diarrhea or constipation. No melena or hematochezia.    Vital Signs Last 24 Hrs  T(C): 36.7 (2019 05:24), Max: 37.1 (2019 16:31)  T(F): 98.1 (2019 05:24), Max: 98.7 (2019 16:31)  HR: 86 (2019 05:24) (69 - 98)  BP: 159/85 (2019 05:24) (108/69 - 159/85)  BP(mean): 96 (2019 19:14) (78 - 97)  RR: 18 (2019 05:24) (18 - 27)  SpO2: 99% (2019 05:24) (91% - 99%)    PHYSICAL EXAM:  Constitutional: Middle aged female in mild sob on exertion, but much improved-multiple medical issues.   Respiratory: CTAB  Cardiovascular: S1 and S2, RRR, no M/G/R  Gastrointestinal: BS+, soft, NT/ND    LABS:                        11.7   11.27 )-----------( 519      ( 2019 04:58 )             36.9     01-03    143  |  107  |  14  ----------------------------<  123<H>  3.7   |  28  |  0.46<L>    Ca    8.5      2019 04:58      PT/INR - ( 2019 08:00 )   PT: 29.1 sec;   INR: 2.55 ratio               RADIOLOGY & ADDITIONAL STUDIES:

## 2019-01-04 NOTE — DISCHARGE NOTE ADULT - CARE PROVIDER_API CALL
Justo Mayer (MD), Cardiovascular Disease; Internal Medicine  [address] [place] [address]  Phone: [phone]  Fax: [phone]

## 2019-01-04 NOTE — DISCHARGE NOTE ADULT - PATIENT PORTAL LINK FT
You can access the [facility] Patient Portal, offered by [facility], by registering with the following website: [email or web address]

## 2019-01-04 NOTE — PROGRESS NOTE ADULT - PROVIDER SPECIALTY LIST ADULT
Cardiology
Gastroenterology
Hospitalist
Infectious Disease
Infectious Disease
Vascular Surgery
Infectious Disease

## 2019-01-07 ENCOUNTER — APPOINTMENT (OUTPATIENT)
Dept: VASCULAR SURGERY | Facility: CLINIC | Age: 69
End: 2019-01-07
Payer: MEDICARE

## 2019-01-07 VITALS
DIASTOLIC BLOOD PRESSURE: 82 MMHG | TEMPERATURE: 98 F | SYSTOLIC BLOOD PRESSURE: 134 MMHG | BODY MASS INDEX: 38.65 KG/M2 | WEIGHT: 270 LBS | OXYGEN SATURATION: 95 % | HEIGHT: 70 IN | HEART RATE: 100 BPM

## 2019-01-07 PROCEDURE — 99213 OFFICE O/P EST LOW 20 MIN: CPT

## 2019-01-08 DIAGNOSIS — I87.8 OTHER SPECIFIED DISORDERS OF VEINS: ICD-10-CM

## 2019-01-08 DIAGNOSIS — Z79.84 LONG TERM (CURRENT) USE OF ORAL HYPOGLYCEMIC DRUGS: ICD-10-CM

## 2019-01-08 DIAGNOSIS — R50.9 FEVER, UNSPECIFIED: ICD-10-CM

## 2019-01-08 DIAGNOSIS — I11.0 HYPERTENSIVE HEART DISEASE WITH HEART FAILURE: ICD-10-CM

## 2019-01-08 DIAGNOSIS — E11.51 TYPE 2 DIABETES MELLITUS WITH DIABETIC PERIPHERAL ANGIOPATHY WITHOUT GANGRENE: ICD-10-CM

## 2019-01-08 DIAGNOSIS — Z79.01 LONG TERM (CURRENT) USE OF ANTICOAGULANTS: ICD-10-CM

## 2019-01-08 DIAGNOSIS — E86.0 DEHYDRATION: ICD-10-CM

## 2019-01-08 DIAGNOSIS — Z88.0 ALLERGY STATUS TO PENICILLIN: ICD-10-CM

## 2019-01-08 DIAGNOSIS — I50.33 ACUTE ON CHRONIC DIASTOLIC (CONGESTIVE) HEART FAILURE: ICD-10-CM

## 2019-01-08 DIAGNOSIS — L97.919 NON-PRESSURE CHRONIC ULCER OF UNSPECIFIED PART OF RIGHT LOWER LEG WITH UNSPECIFIED SEVERITY: ICD-10-CM

## 2019-01-08 DIAGNOSIS — E11.622 TYPE 2 DIABETES MELLITUS WITH OTHER SKIN ULCER: ICD-10-CM

## 2019-01-08 DIAGNOSIS — K51.90 ULCERATIVE COLITIS, UNSPECIFIED, WITHOUT COMPLICATIONS: ICD-10-CM

## 2019-01-08 DIAGNOSIS — E87.6 HYPOKALEMIA: ICD-10-CM

## 2019-01-08 DIAGNOSIS — Z98.84 BARIATRIC SURGERY STATUS: ICD-10-CM

## 2019-01-08 DIAGNOSIS — I87.2 VENOUS INSUFFICIENCY (CHRONIC) (PERIPHERAL): ICD-10-CM

## 2019-01-08 DIAGNOSIS — K20.8 OTHER ESOPHAGITIS: ICD-10-CM

## 2019-01-08 DIAGNOSIS — D47.3 ESSENTIAL (HEMORRHAGIC) THROMBOCYTHEMIA: ICD-10-CM

## 2019-01-08 DIAGNOSIS — E78.5 HYPERLIPIDEMIA, UNSPECIFIED: ICD-10-CM

## 2019-01-08 DIAGNOSIS — E66.01 MORBID (SEVERE) OBESITY DUE TO EXCESS CALORIES: ICD-10-CM

## 2019-01-08 DIAGNOSIS — E87.2 ACIDOSIS: ICD-10-CM

## 2019-01-08 DIAGNOSIS — Z79.82 LONG TERM (CURRENT) USE OF ASPIRIN: ICD-10-CM

## 2019-01-08 DIAGNOSIS — I48.91 UNSPECIFIED ATRIAL FIBRILLATION: ICD-10-CM

## 2019-01-08 DIAGNOSIS — Z87.891 PERSONAL HISTORY OF NICOTINE DEPENDENCE: ICD-10-CM

## 2019-01-08 DIAGNOSIS — J18.9 PNEUMONIA, UNSPECIFIED ORGANISM: ICD-10-CM

## 2019-01-08 DIAGNOSIS — E11.40 TYPE 2 DIABETES MELLITUS WITH DIABETIC NEUROPATHY, UNSPECIFIED: ICD-10-CM

## 2019-01-08 DIAGNOSIS — A41.9 SEPSIS, UNSPECIFIED ORGANISM: ICD-10-CM

## 2019-01-14 ENCOUNTER — APPOINTMENT (OUTPATIENT)
Dept: VASCULAR SURGERY | Facility: CLINIC | Age: 69
End: 2019-01-14
Payer: MEDICARE

## 2019-01-14 VITALS
WEIGHT: 270 LBS | TEMPERATURE: 98.2 F | BODY MASS INDEX: 38.65 KG/M2 | SYSTOLIC BLOOD PRESSURE: 130 MMHG | OXYGEN SATURATION: 95 % | HEIGHT: 70 IN | DIASTOLIC BLOOD PRESSURE: 76 MMHG | HEART RATE: 90 BPM

## 2019-01-14 PROCEDURE — 99213 OFFICE O/P EST LOW 20 MIN: CPT

## 2019-01-27 NOTE — HISTORY OF PRESENT ILLNESS
[FreeTextEntry1] : The patient is a 69 y/o female with pain and swelling to BLE for 3 months. There is an ulcer to the back of her right calf. There is no weeping of drainage of wound or legs. She has been using Santyl and Silvadene for wound healing. She was previously seen by Dr Rivera who debrided the wound and applied unna boots with limited improvement. Comorbidities include DM2, HTN and A Fib. She takes ASA 81 mg, Coumadin and Pravastatin 10 mg QD [de-identified] : 69 y/o female RTC for followup.  Was seen 2 days ago after her dog jumped on her foot and caused increased bruising, swelling and pain to right foot.  There is improvement in discoloration from hematoma. Patient reports severe pain with light touch, continues to improve. Better today will transition to support stockings for left leg.

## 2019-01-27 NOTE — PHYSICAL EXAM
[2+] : left 2+ [Ankle Swelling (On Exam)] : present [Ankle Swelling Bilaterally] : bilaterally  [] : bilaterally [Ankle Swelling On The Left] : moderate [Alert] : alert [Oriented to Person] : oriented to person [Oriented to Place] : oriented to place [Oriented to Time] : oriented to time [Calm] : calm [de-identified] : WD, WN, NAD. Awake, alert, interactive. Ambulates slowly with a cane for support. [de-identified] : LETICIA, PERSHASHAL [de-identified] : supple [de-identified] : There is swelling and considerable ecchymosis to dorsal right foot. Pain with slight toe movements. [de-identified] : WWP. Ulcer to right posterior calf 8 cm (l) x 1 cm (w). no active drainage. No s/s of infection.

## 2019-01-27 NOTE — ASSESSMENT
[FreeTextEntry1] : 67 y/o female with pain, edema and ecchymosis to right foot after trauma. All area improved. Will switch bandages to collagenase/xeroform then ace bandage to lower extremity. The left leg will transition to support stockings. Looks so much better today. Continue with xeroform and santyl. \par \par -Continue biweekly wound care\par -Pain control\par -Elevation/exercise\par -RTC next week [Ulcer Care] : ulcer care

## 2019-02-01 ENCOUNTER — APPOINTMENT (OUTPATIENT)
Age: 69
End: 2019-02-01
Payer: MEDICARE

## 2019-02-01 VITALS
OXYGEN SATURATION: 98 % | HEIGHT: 70 IN | WEIGHT: 270 LBS | HEART RATE: 79 BPM | DIASTOLIC BLOOD PRESSURE: 95 MMHG | SYSTOLIC BLOOD PRESSURE: 159 MMHG | BODY MASS INDEX: 38.65 KG/M2

## 2019-02-01 PROCEDURE — 99213 OFFICE O/P EST LOW 20 MIN: CPT

## 2019-02-08 ENCOUNTER — APPOINTMENT (OUTPATIENT)
Dept: VASCULAR SURGERY | Facility: CLINIC | Age: 69
End: 2019-02-08
Payer: MEDICARE

## 2019-02-08 VITALS
WEIGHT: 270 LBS | HEIGHT: 70 IN | OXYGEN SATURATION: 95 % | SYSTOLIC BLOOD PRESSURE: 103 MMHG | TEMPERATURE: 97.7 F | BODY MASS INDEX: 38.65 KG/M2 | HEART RATE: 116 BPM | DIASTOLIC BLOOD PRESSURE: 67 MMHG

## 2019-02-08 PROCEDURE — 99213 OFFICE O/P EST LOW 20 MIN: CPT

## 2019-02-09 NOTE — ASSESSMENT
[FreeTextEntry1] : 67 y/o woman with lymphedema and stasis ulceration, with 2 wounds that are new in the dorsum of foot in addition to posterior calf. Overall today continues to improve. Cleansed wounds with soap and water and redressed with xeroform and ace bandage. Granulating nicely and significantly smaller in size.\par \par Today the lower leg seems more erythematous, will restart  antibiotics to prevent cellulitis.

## 2019-02-09 NOTE — HISTORY OF PRESENT ILLNESS
[FreeTextEntry1] : The patient is a 69 y/o female with pain and swelling to BLE for 3 months. There is an ulcer to the back of her right calf. There is no weeping of drainage of wound or legs. She has been using Santyl and Silvadene for wound healing. She was previously seen by Dr Rivera who debrided the wound and applied unna boots with limited improvement. Comorbidities include DM2, HTN and A Fib. She takes ASA 81 mg, Coumadin and Pravastatin 10 mg QD [de-identified] : 69 y/o female RTC for follow up, now using santyl, xeroform and ace twice/week, feels and looks better. Wound is smaller an granulating.

## 2019-02-09 NOTE — PHYSICAL EXAM
[2+] : left 2+ [1+] : left 1+ [Ankle Swelling (On Exam)] : present [Ankle Swelling Bilaterally] : bilaterally  [] : bilaterally [Ankle Swelling On The Left] : moderate [Alert] : alert [Oriented to Person] : oriented to person [Oriented to Place] : oriented to place [Oriented to Time] : oriented to time [Calm] : calm [de-identified] : WD, WN, NAD. Awake, alert, interactive. Ambulates slowly with a cane for support. [de-identified] : LETICIA, PERSHASHAL [de-identified] : supple [de-identified] : There is swelling and considerable ecchymosis to dorsal right foot. Pain with slight toe movements. [de-identified] : WWP. Ulcer to right posterior calf 8 cm (l) x 1 cm (w). no active drainage. No s/s of infection.

## 2019-02-15 ENCOUNTER — APPOINTMENT (OUTPATIENT)
Dept: VASCULAR SURGERY | Facility: CLINIC | Age: 69
End: 2019-02-15

## 2019-02-22 ENCOUNTER — APPOINTMENT (OUTPATIENT)
Dept: VASCULAR SURGERY | Facility: CLINIC | Age: 69
End: 2019-02-22
Payer: MEDICARE

## 2019-02-22 VITALS
OXYGEN SATURATION: 97 % | HEART RATE: 86 BPM | DIASTOLIC BLOOD PRESSURE: 87 MMHG | WEIGHT: 270 LBS | SYSTOLIC BLOOD PRESSURE: 161 MMHG | BODY MASS INDEX: 38.65 KG/M2 | HEIGHT: 70 IN

## 2019-02-22 PROCEDURE — 99213 OFFICE O/P EST LOW 20 MIN: CPT

## 2019-02-23 NOTE — HISTORY OF PRESENT ILLNESS
[FreeTextEntry1] : The patient is a 69 y/o female with pain and swelling to BLE for 3 months. There is an ulcer to the back of her right calf. There is no weeping of drainage of wound or legs. She has been using Santyl and Silvadene for wound healing. She was previously seen by Dr Rivera who debrided the wound and applied unna boots with limited improvement. Comorbidities include DM2, HTN and A Fib. She takes ASA 81 mg, Coumadin and Pravastatin 10 mg QD [de-identified] : 67 y/o female RTC for follow up, now using santyl, xeroform and ace twice/week, feels and looks better. Wound is smaller an granulating.

## 2019-02-23 NOTE — PHYSICAL EXAM
[2+] : left 2+ [1+] : left 1+ [Ankle Swelling (On Exam)] : present [Ankle Swelling Bilaterally] : bilaterally  [] : bilaterally [Ankle Swelling On The Left] : moderate [Alert] : alert [Oriented to Person] : oriented to person [Oriented to Place] : oriented to place [Oriented to Time] : oriented to time [Calm] : calm [de-identified] : WD, WN, NAD. Awake, alert, interactive. Ambulates slowly with a cane for support. [de-identified] : LETICIA, PERSHASHAL [de-identified] : supple [de-identified] : There is swelling and considerable ecchymosis to dorsal right foot. Pain with slight toe movements. [de-identified] : WWP. Ulcer to right posterior calf 8 cm (l) x 1 cm (w). no active drainage. No s/s of infection.

## 2019-02-23 NOTE — ASSESSMENT
[FreeTextEntry1] : 67 y/o woman with lymphedema and stasis ulceration, with 2 wounds that are new in the dorsum of foot in addition to posterior calf. Overall today continues to improve. Cleansed wounds with soap and water and redressed with xeroform and ace bandage. Granulating nicely and significantly smaller in size.

## 2019-03-01 ENCOUNTER — APPOINTMENT (OUTPATIENT)
Dept: VASCULAR SURGERY | Facility: CLINIC | Age: 69
End: 2019-03-01
Payer: MEDICARE

## 2019-03-01 PROCEDURE — 99213 OFFICE O/P EST LOW 20 MIN: CPT

## 2019-03-06 NOTE — HISTORY OF PRESENT ILLNESS
[FreeTextEntry1] : The patient is a 69 y/o female with pain and swelling to BLE for 3 months. There is an ulcer to the back of her right calf. There is no weeping of drainage of wound or legs. She has been using Santyl and Silvadene for wound healing. She was previously seen by Dr Rivera who debrided the wound and applied unna boots with limited improvement. Comorbidities include DM2, HTN and A Fib. She takes ASA 81 mg, Coumadin and Pravastatin 10 mg QD [de-identified] : 67 y/o female RTC for follow up, now using Santyl, Xeroform and ace twice/week, feels and looks better. Wound is smaller an granulating. She has been wearing an Unna Boot since before coming to our office in October 2018.

## 2019-03-06 NOTE — ASSESSMENT
[FreeTextEntry1] : 69 y/o woman with lymphedema and stasis ulceration, with 2 wounds that are new in the dorsum of foot in addition to posterior calf. Overall today continues to improve. Cleansed wounds with soap and water and redressed with Xeroform and ace bandage. Granulating nicely and significantly smaller in size.\par \par Today the lower leg seems improving, will continue Santyl.

## 2019-03-06 NOTE — PHYSICAL EXAM
[2+] : left 2+ [1+] : left 1+ [Ankle Swelling (On Exam)] : present [Ankle Swelling Bilaterally] : bilaterally  [] : bilaterally [Ankle Swelling On The Left] : moderate [Alert] : alert [Oriented to Person] : oriented to person [Oriented to Place] : oriented to place [Oriented to Time] : oriented to time [Calm] : calm [de-identified] : WD, WN, NAD. Awake, alert, interactive. Ambulates slowly with a cane for support. [de-identified] : LETICIA, PERSHASHAL [de-identified] : supple [de-identified] : There is swelling and considerable ecchymosis to dorsal right foot. Pain with slight toe movements. [de-identified] : WWP. Ulcer to right posterior calf 8 cm (l) x 1 cm (w). no active drainage. There are venous stasis skin changes: venous stasis hyperpigmentation and hyperkeratosis. No s/s of infection.

## 2019-03-08 ENCOUNTER — APPOINTMENT (OUTPATIENT)
Dept: VASCULAR SURGERY | Facility: CLINIC | Age: 69
End: 2019-03-08
Payer: MEDICARE

## 2019-03-08 VITALS
HEART RATE: 96 BPM | HEIGHT: 70 IN | WEIGHT: 270 LBS | SYSTOLIC BLOOD PRESSURE: 138 MMHG | DIASTOLIC BLOOD PRESSURE: 85 MMHG | BODY MASS INDEX: 38.65 KG/M2 | OXYGEN SATURATION: 96 %

## 2019-03-08 PROCEDURE — 99213 OFFICE O/P EST LOW 20 MIN: CPT

## 2019-03-12 NOTE — HISTORY OF PRESENT ILLNESS
[FreeTextEntry1] : The patient is a 67 y/o female with pain and swelling to BLE for 3 months. There is an ulcer to the back of her right calf. There is no weeping of drainage of wound or legs. She has been using Santyl and Silvadene for wound healing. She was previously seen by Dr Rivera who debrided the wound and applied unna boots with limited improvement. Comorbidities include DM2, HTN and A Fib. She takes ASA 81 mg, Coumadin and Pravastatin 10 mg QD [de-identified] : 67 y/o female RTC for follow up, now using santyl, xeroform and ace twice/week, feels and looks better. Wound is smaller an granulating.

## 2019-03-12 NOTE — PHYSICAL EXAM
[2+] : left 2+ [1+] : left 1+ [Ankle Swelling (On Exam)] : present [Ankle Swelling Bilaterally] : bilaterally  [] : bilaterally [Ankle Swelling On The Left] : moderate [Alert] : alert [Oriented to Person] : oriented to person [Oriented to Place] : oriented to place [Oriented to Time] : oriented to time [Calm] : calm [de-identified] : WD, WN, NAD. Awake, alert, interactive. Ambulates slowly with a cane for support. [de-identified] : LETICIA, PERSHASHAL [de-identified] : supple [de-identified] : There is swelling and considerable ecchymosis to dorsal right foot. Pain with slight toe movements. [de-identified] : WWP. Ulcer to right posterior calf 8 cm (l) x 1 cm (w). no active drainage. No s/s of infection.

## 2019-03-12 NOTE — ASSESSMENT
[FreeTextEntry1] : 69 y/o woman with lymphedema and stasis ulceration, with 2 wounds that are new in the dorsum of foot in addition to posterior calf. Overall today continues to improve. Cleansed wounds with soap and water and redressed with xeroform and ace bandage. Granulating nicely and significantly smaller in size.

## 2019-03-15 ENCOUNTER — APPOINTMENT (OUTPATIENT)
Dept: VASCULAR SURGERY | Facility: CLINIC | Age: 69
End: 2019-03-15
Payer: MEDICARE

## 2019-03-15 PROCEDURE — 99213 OFFICE O/P EST LOW 20 MIN: CPT

## 2019-03-18 VITALS
BODY MASS INDEX: 38.65 KG/M2 | DIASTOLIC BLOOD PRESSURE: 84 MMHG | WEIGHT: 270 LBS | HEART RATE: 93 BPM | HEIGHT: 70 IN | OXYGEN SATURATION: 95 % | SYSTOLIC BLOOD PRESSURE: 136 MMHG

## 2019-03-18 NOTE — ASSESSMENT
[FreeTextEntry1] : 67 y/o woman with lymphedema and stasis ulceration, with 2 wounds that are new in the dorsum of foot in addition to posterior calf. Overall today continues to improve. Cleansed wounds with soap and water and redressed with xeroform and ace bandage. Granulating nicely and significantly smaller in size. Placed a touch of collagenase as well.\par \par -Continue wound care\par -Ambulation/elevation\par -RTC next week [Ulcer Care] : ulcer care

## 2019-03-18 NOTE — HISTORY OF PRESENT ILLNESS
[FreeTextEntry1] : The patient is a 67 y/o female with pain and swelling to BLE for 3 months. There is an ulcer to the back of her right calf. There is no weeping of drainage of wound or legs. She has been using Santyl and Silvadene for wound healing. She was previously seen by Dr Rivera who debrided the wound and applied unna boots with limited improvement. Comorbidities include DM2, HTN and A Fib. She takes ASA 81 mg, Coumadin and Pravastatin 10 mg QD [de-identified] : 69 y/o female RTC for follow up, now using santyl, xeroform and ace twice/week, feels and looks better. Wound is smaller an granulating.

## 2019-03-18 NOTE — PHYSICAL EXAM
[2+] : left 2+ [1+] : right 1+ [Ankle Swelling (On Exam)] : present [Ankle Swelling Bilaterally] : bilaterally  [] : bilaterally [Ankle Swelling On The Left] : moderate [Alert] : alert [Oriented to Person] : oriented to person [Oriented to Place] : oriented to place [Oriented to Time] : oriented to time [Calm] : calm [de-identified] : WD, WN, NAD. Awake, alert, interactive. Ambulates slowly with a cane for support. [de-identified] : supple [de-identified] : LETICIA, PERSHASHAL [de-identified] : WWP. Ulcer to right posterior calf 8 cm (l) x 1 cm (w). no active drainage. No s/s of infection. [de-identified] : There is swelling and considerable ecchymosis to dorsal right foot. Pain with slight toe movements.

## 2019-03-22 ENCOUNTER — APPOINTMENT (OUTPATIENT)
Dept: VASCULAR SURGERY | Facility: CLINIC | Age: 69
End: 2019-03-22
Payer: MEDICARE

## 2019-03-22 PROCEDURE — 99213 OFFICE O/P EST LOW 20 MIN: CPT

## 2019-04-05 ENCOUNTER — APPOINTMENT (OUTPATIENT)
Dept: VASCULAR SURGERY | Facility: CLINIC | Age: 69
End: 2019-04-05
Payer: MEDICARE

## 2019-04-05 VITALS
WEIGHT: 270 LBS | HEIGHT: 70 IN | OXYGEN SATURATION: 97 % | HEART RATE: 87 BPM | TEMPERATURE: 98.3 F | BODY MASS INDEX: 38.65 KG/M2 | DIASTOLIC BLOOD PRESSURE: 86 MMHG | SYSTOLIC BLOOD PRESSURE: 141 MMHG

## 2019-04-05 PROCEDURE — 99213 OFFICE O/P EST LOW 20 MIN: CPT

## 2019-04-09 NOTE — PHYSICAL EXAM
[2+] : right 2+ [1+] : left 1+ [Ankle Swelling (On Exam)] : present [Ankle Swelling Bilaterally] : bilaterally  [] : bilaterally [Oriented to Person] : oriented to person [Ankle Swelling On The Left] : moderate [Alert] : alert [Oriented to Time] : oriented to time [Calm] : calm [Oriented to Place] : oriented to place [de-identified] : LETICIA, PERSHASHAL [de-identified] : WD, WN, NAD. Awake, alert, interactive. Ambulates slowly with a cane for support. [de-identified] : supple [de-identified] : There is swelling and considerable ecchymosis to dorsal right foot. Pain with slight toe movements. [de-identified] : WWP. Ulcer to right posterior calf 8 cm (l) x 1 cm (w). no active drainage. There are venous stasis skin changes: venous stasis hyperpigmentation and hyperkeratosis. No s/s of infection.

## 2019-04-09 NOTE — HISTORY OF PRESENT ILLNESS
[FreeTextEntry1] : The patient is a 69 y/o female with pain and swelling to BLE for 3 months. There is an ulcer to the back of her right calf. There is no weeping of drainage of wound or legs. She has been using Santyl and Silvadene for wound healing. She was previously seen by Dr Rivera who debrided the wound and applied unna boots with limited improvement. Comorbidities include DM2, HTN and A Fib. She takes ASA 81 mg, Coumadin and Pravastatin 10 mg QD [de-identified] : 67 y/o female RTC for follow up, now using Santyl, Xeroform and ace twice/week, feels and looks better. Wound is smaller an granulating. Was doing well with compression since before coming to our office in October 2018.

## 2019-04-09 NOTE — ASSESSMENT
[FreeTextEntry1] : 67 y/o woman with lymphedema and stasis ulceration, with 2 wounds that are new in the dorsum of foot in addition to posterior calf. Overall today continues to improve. Cleansed wounds with soap and water and redressed with Xeroform and ace bandage. Granulating nicely and significantly smaller in size.\par \par Today the lower leg seems improving, will continue Santyl. Went to a cruise so legs a bit more swollen today, no cellulitis. Continue care as needed and follow weekly for now. [Arterial/Venous Disease] : arterial/venous disease

## 2019-04-16 ENCOUNTER — INPATIENT (INPATIENT)
Facility: HOSPITAL | Age: 69
LOS: 19 days | Discharge: HOME HEALTH | End: 2019-05-06
Attending: INTERNAL MEDICINE | Admitting: INTERNAL MEDICINE
Payer: MEDICARE

## 2019-04-16 ENCOUNTER — MESSAGE (OUTPATIENT)
Age: 69
End: 2019-04-16

## 2019-04-16 VITALS — WEIGHT: 210.1 LBS | HEIGHT: 70 IN

## 2019-04-16 DIAGNOSIS — Z98.890 OTHER SPECIFIED POSTPROCEDURAL STATES: Chronic | ICD-10-CM

## 2019-04-16 DIAGNOSIS — K95.09 OTHER COMPLICATIONS OF GASTRIC BAND PROCEDURE: Chronic | ICD-10-CM

## 2019-04-16 LAB
ALBUMIN SERPL ELPH-MCNC: 2.9 G/DL — LOW (ref 3.3–5)
ALP SERPL-CCNC: 70 U/L — SIGNIFICANT CHANGE UP (ref 40–120)
ALT FLD-CCNC: 29 U/L — SIGNIFICANT CHANGE UP (ref 12–78)
ANION GAP SERPL CALC-SCNC: 9 MMOL/L — SIGNIFICANT CHANGE UP (ref 5–17)
APPEARANCE UR: CLEAR — SIGNIFICANT CHANGE UP
APTT BLD: 29.4 SEC — SIGNIFICANT CHANGE UP (ref 27.5–36.3)
AST SERPL-CCNC: 18 U/L — SIGNIFICANT CHANGE UP (ref 15–37)
BASOPHILS # BLD AUTO: 0.03 K/UL — SIGNIFICANT CHANGE UP (ref 0–0.2)
BASOPHILS NFR BLD AUTO: 0.1 % — SIGNIFICANT CHANGE UP (ref 0–2)
BILIRUB SERPL-MCNC: 0.5 MG/DL — SIGNIFICANT CHANGE UP (ref 0.2–1.2)
BILIRUB UR-MCNC: NEGATIVE — SIGNIFICANT CHANGE UP
BUN SERPL-MCNC: 18 MG/DL — SIGNIFICANT CHANGE UP (ref 7–23)
CALCIUM SERPL-MCNC: 8.6 MG/DL — SIGNIFICANT CHANGE UP (ref 8.5–10.1)
CHLORIDE SERPL-SCNC: 105 MMOL/L — SIGNIFICANT CHANGE UP (ref 96–108)
CK SERPL-CCNC: 59 U/L — SIGNIFICANT CHANGE UP (ref 26–192)
CO2 SERPL-SCNC: 24 MMOL/L — SIGNIFICANT CHANGE UP (ref 22–31)
COLOR SPEC: YELLOW — SIGNIFICANT CHANGE UP
CREAT SERPL-MCNC: 0.78 MG/DL — SIGNIFICANT CHANGE UP (ref 0.5–1.3)
DIFF PNL FLD: NEGATIVE — SIGNIFICANT CHANGE UP
EOSINOPHIL # BLD AUTO: 0 K/UL — SIGNIFICANT CHANGE UP (ref 0–0.5)
EOSINOPHIL NFR BLD AUTO: 0 % — SIGNIFICANT CHANGE UP (ref 0–6)
ERYTHROCYTE [SEDIMENTATION RATE] IN BLOOD: 30 MM/HR — HIGH (ref 0–20)
GLUCOSE BLDC GLUCOMTR-MCNC: 157 MG/DL — HIGH (ref 70–99)
GLUCOSE SERPL-MCNC: 162 MG/DL — HIGH (ref 70–99)
GLUCOSE UR QL: NEGATIVE MG/DL — SIGNIFICANT CHANGE UP
HCT VFR BLD CALC: 43.4 % — SIGNIFICANT CHANGE UP (ref 34.5–45)
HGB BLD-MCNC: 13.9 G/DL — SIGNIFICANT CHANGE UP (ref 11.5–15.5)
IMM GRANULOCYTES NFR BLD AUTO: 0.4 % — SIGNIFICANT CHANGE UP (ref 0–1.5)
INR BLD: 1.67 RATIO — HIGH (ref 0.88–1.16)
KETONES UR-MCNC: NEGATIVE — SIGNIFICANT CHANGE UP
LACTATE SERPL-SCNC: 2.5 MMOL/L — HIGH (ref 0.7–2)
LACTATE SERPL-SCNC: 3 MMOL/L — HIGH (ref 0.7–2)
LEUKOCYTE ESTERASE UR-ACNC: NEGATIVE — SIGNIFICANT CHANGE UP
LYMPHOCYTES # BLD AUTO: 1.06 K/UL — SIGNIFICANT CHANGE UP (ref 1–3.3)
LYMPHOCYTES # BLD AUTO: 4.7 % — LOW (ref 13–44)
MANUAL SMEAR VERIFICATION: SIGNIFICANT CHANGE UP
MCHC RBC-ENTMCNC: 30.6 PG — SIGNIFICANT CHANGE UP (ref 27–34)
MCHC RBC-ENTMCNC: 32 GM/DL — SIGNIFICANT CHANGE UP (ref 32–36)
MCV RBC AUTO: 95.6 FL — SIGNIFICANT CHANGE UP (ref 80–100)
MONOCYTES # BLD AUTO: 1.31 K/UL — HIGH (ref 0–0.9)
MONOCYTES NFR BLD AUTO: 5.8 % — SIGNIFICANT CHANGE UP (ref 2–14)
NEUTROPHILS # BLD AUTO: 20.09 K/UL — HIGH (ref 1.8–7.4)
NEUTROPHILS NFR BLD AUTO: 89 % — HIGH (ref 43–77)
NITRITE UR-MCNC: NEGATIVE — SIGNIFICANT CHANGE UP
NRBC # BLD: 0 /100 WBCS — SIGNIFICANT CHANGE UP (ref 0–0)
PH UR: 6 — SIGNIFICANT CHANGE UP (ref 5–8)
PLAT MORPH BLD: NORMAL — SIGNIFICANT CHANGE UP
PLATELET # BLD AUTO: 311 K/UL — SIGNIFICANT CHANGE UP (ref 150–400)
PLATELET COUNT - ESTIMATE: NORMAL — SIGNIFICANT CHANGE UP
POTASSIUM SERPL-MCNC: 4.4 MMOL/L — SIGNIFICANT CHANGE UP (ref 3.5–5.3)
POTASSIUM SERPL-SCNC: 4.4 MMOL/L — SIGNIFICANT CHANGE UP (ref 3.5–5.3)
PROT SERPL-MCNC: 7.2 GM/DL — SIGNIFICANT CHANGE UP (ref 6–8.3)
PROT UR-MCNC: NEGATIVE MG/DL — SIGNIFICANT CHANGE UP
PROTHROM AB SERPL-ACNC: 18.8 SEC — HIGH (ref 10–12.9)
RBC # BLD: 4.54 M/UL — SIGNIFICANT CHANGE UP (ref 3.8–5.2)
RBC # FLD: 15.2 % — HIGH (ref 10.3–14.5)
RBC BLD AUTO: NORMAL — SIGNIFICANT CHANGE UP
SODIUM SERPL-SCNC: 138 MMOL/L — SIGNIFICANT CHANGE UP (ref 135–145)
SP GR SPEC: 1 — LOW (ref 1.01–1.02)
UROBILINOGEN FLD QL: NEGATIVE MG/DL — SIGNIFICANT CHANGE UP
WBC # BLD: 22.59 K/UL — HIGH (ref 3.8–10.5)
WBC # FLD AUTO: 22.59 K/UL — HIGH (ref 3.8–10.5)

## 2019-04-16 PROCEDURE — 73620 X-RAY EXAM OF FOOT: CPT | Mod: 26,LT

## 2019-04-16 PROCEDURE — 93971 EXTREMITY STUDY: CPT | Mod: 26,LT

## 2019-04-16 PROCEDURE — 99285 EMERGENCY DEPT VISIT HI MDM: CPT

## 2019-04-16 PROCEDURE — 73701 CT LOWER EXTREMITY W/DYE: CPT | Mod: 26,LT

## 2019-04-16 PROCEDURE — 93010 ELECTROCARDIOGRAM REPORT: CPT

## 2019-04-16 PROCEDURE — 71046 X-RAY EXAM CHEST 2 VIEWS: CPT | Mod: 26

## 2019-04-16 PROCEDURE — 73590 X-RAY EXAM OF LOWER LEG: CPT | Mod: 26,LT

## 2019-04-16 RX ORDER — SODIUM CHLORIDE 9 MG/ML
1100 INJECTION INTRAMUSCULAR; INTRAVENOUS; SUBCUTANEOUS ONCE
Qty: 0 | Refills: 0 | Status: COMPLETED | OUTPATIENT
Start: 2019-04-16 | End: 2019-04-16

## 2019-04-16 RX ORDER — CEFEPIME 1 G/1
1000 INJECTION, POWDER, FOR SOLUTION INTRAMUSCULAR; INTRAVENOUS ONCE
Qty: 0 | Refills: 0 | Status: COMPLETED | OUTPATIENT
Start: 2019-04-16 | End: 2019-04-16

## 2019-04-16 RX ORDER — HYDROMORPHONE HYDROCHLORIDE 2 MG/ML
0.5 INJECTION INTRAMUSCULAR; INTRAVENOUS; SUBCUTANEOUS EVERY 6 HOURS
Qty: 0 | Refills: 0 | Status: DISCONTINUED | OUTPATIENT
Start: 2019-04-16 | End: 2019-04-23

## 2019-04-16 RX ORDER — SODIUM CHLORIDE 9 MG/ML
1000 INJECTION INTRAMUSCULAR; INTRAVENOUS; SUBCUTANEOUS ONCE
Qty: 0 | Refills: 0 | Status: COMPLETED | OUTPATIENT
Start: 2019-04-16 | End: 2019-04-16

## 2019-04-16 RX ORDER — SODIUM CHLORIDE 9 MG/ML
1000 INJECTION, SOLUTION INTRAVENOUS
Qty: 0 | Refills: 0 | Status: DISCONTINUED | OUTPATIENT
Start: 2019-04-16 | End: 2019-05-06

## 2019-04-16 RX ORDER — ZOLPIDEM TARTRATE 10 MG/1
5 TABLET ORAL AT BEDTIME
Qty: 0 | Refills: 0 | Status: DISCONTINUED | OUTPATIENT
Start: 2019-04-16 | End: 2019-04-16

## 2019-04-16 RX ORDER — ATORVASTATIN CALCIUM 80 MG/1
10 TABLET, FILM COATED ORAL AT BEDTIME
Qty: 0 | Refills: 0 | Status: DISCONTINUED | OUTPATIENT
Start: 2019-04-16 | End: 2019-05-06

## 2019-04-16 RX ORDER — SODIUM CHLORIDE 9 MG/ML
500 INJECTION INTRAMUSCULAR; INTRAVENOUS; SUBCUTANEOUS ONCE
Qty: 0 | Refills: 0 | Status: COMPLETED | OUTPATIENT
Start: 2019-04-16 | End: 2019-04-16

## 2019-04-16 RX ORDER — DEXTROSE 50 % IN WATER 50 %
15 SYRINGE (ML) INTRAVENOUS ONCE
Qty: 0 | Refills: 0 | Status: DISCONTINUED | OUTPATIENT
Start: 2019-04-16 | End: 2019-05-06

## 2019-04-16 RX ORDER — ACETAMINOPHEN 500 MG
650 TABLET ORAL EVERY 6 HOURS
Qty: 0 | Refills: 0 | Status: DISCONTINUED | OUTPATIENT
Start: 2019-04-16 | End: 2019-05-06

## 2019-04-16 RX ORDER — DILTIAZEM HCL 120 MG
120 CAPSULE, EXT RELEASE 24 HR ORAL
Qty: 0 | Refills: 0 | Status: DISCONTINUED | OUTPATIENT
Start: 2019-04-16 | End: 2019-05-06

## 2019-04-16 RX ORDER — VANCOMYCIN HCL 1 G
1500 VIAL (EA) INTRAVENOUS ONCE
Qty: 0 | Refills: 0 | Status: DISCONTINUED | OUTPATIENT
Start: 2019-04-16 | End: 2019-04-16

## 2019-04-16 RX ORDER — GABAPENTIN 400 MG/1
300 CAPSULE ORAL DAILY
Qty: 0 | Refills: 0 | Status: DISCONTINUED | OUTPATIENT
Start: 2019-04-16 | End: 2019-04-27

## 2019-04-16 RX ORDER — VANCOMYCIN HCL 1 G
1000 VIAL (EA) INTRAVENOUS ONCE
Qty: 0 | Refills: 0 | Status: DISCONTINUED | OUTPATIENT
Start: 2019-04-16 | End: 2019-04-16

## 2019-04-16 RX ORDER — GABAPENTIN 400 MG/1
300 CAPSULE ORAL DAILY
Qty: 0 | Refills: 0 | Status: DISCONTINUED | OUTPATIENT
Start: 2019-04-16 | End: 2019-04-16

## 2019-04-16 RX ORDER — METOPROLOL TARTRATE 50 MG
100 TABLET ORAL DAILY
Qty: 0 | Refills: 0 | Status: DISCONTINUED | OUTPATIENT
Start: 2019-04-16 | End: 2019-05-06

## 2019-04-16 RX ORDER — ASCORBIC ACID 60 MG
500 TABLET,CHEWABLE ORAL DAILY
Qty: 0 | Refills: 0 | Status: DISCONTINUED | OUTPATIENT
Start: 2019-04-16 | End: 2019-05-06

## 2019-04-16 RX ORDER — HYDROMORPHONE HYDROCHLORIDE 2 MG/ML
1 INJECTION INTRAMUSCULAR; INTRAVENOUS; SUBCUTANEOUS ONCE
Qty: 0 | Refills: 0 | Status: DISCONTINUED | OUTPATIENT
Start: 2019-04-16 | End: 2019-04-16

## 2019-04-16 RX ORDER — DEXTROSE 50 % IN WATER 50 %
25 SYRINGE (ML) INTRAVENOUS ONCE
Qty: 0 | Refills: 0 | Status: DISCONTINUED | OUTPATIENT
Start: 2019-04-16 | End: 2019-05-06

## 2019-04-16 RX ORDER — DOCUSATE SODIUM 100 MG
100 CAPSULE ORAL THREE TIMES A DAY
Qty: 0 | Refills: 0 | Status: DISCONTINUED | OUTPATIENT
Start: 2019-04-16 | End: 2019-04-23

## 2019-04-16 RX ORDER — MONTELUKAST 4 MG/1
10 TABLET, CHEWABLE ORAL AT BEDTIME
Qty: 0 | Refills: 0 | Status: DISCONTINUED | OUTPATIENT
Start: 2019-04-16 | End: 2019-05-06

## 2019-04-16 RX ORDER — GLUCAGON INJECTION, SOLUTION 0.5 MG/.1ML
1 INJECTION, SOLUTION SUBCUTANEOUS ONCE
Qty: 0 | Refills: 0 | Status: DISCONTINUED | OUTPATIENT
Start: 2019-04-16 | End: 2019-05-06

## 2019-04-16 RX ORDER — ASPIRIN/CALCIUM CARB/MAGNESIUM 324 MG
81 TABLET ORAL DAILY
Qty: 0 | Refills: 0 | Status: DISCONTINUED | OUTPATIENT
Start: 2019-04-16 | End: 2019-05-06

## 2019-04-16 RX ORDER — DEXTROSE 50 % IN WATER 50 %
12.5 SYRINGE (ML) INTRAVENOUS ONCE
Qty: 0 | Refills: 0 | Status: DISCONTINUED | OUTPATIENT
Start: 2019-04-16 | End: 2019-05-06

## 2019-04-16 RX ORDER — AZTREONAM 2 G
500 VIAL (EA) INJECTION EVERY 8 HOURS
Qty: 0 | Refills: 0 | Status: DISCONTINUED | OUTPATIENT
Start: 2019-04-16 | End: 2019-04-17

## 2019-04-16 RX ORDER — WARFARIN SODIUM 2.5 MG/1
6 TABLET ORAL ONCE
Qty: 0 | Refills: 0 | Status: COMPLETED | OUTPATIENT
Start: 2019-04-16 | End: 2019-04-16

## 2019-04-16 RX ORDER — INSULIN LISPRO 100/ML
VIAL (ML) SUBCUTANEOUS
Qty: 0 | Refills: 0 | Status: DISCONTINUED | OUTPATIENT
Start: 2019-04-16 | End: 2019-05-06

## 2019-04-16 RX ORDER — FUROSEMIDE 40 MG
40 TABLET ORAL ONCE
Qty: 0 | Refills: 0 | Status: COMPLETED | OUTPATIENT
Start: 2019-04-16 | End: 2019-04-16

## 2019-04-16 RX ADMIN — MONTELUKAST 10 MILLIGRAM(S): 4 TABLET, CHEWABLE ORAL at 23:50

## 2019-04-16 RX ADMIN — Medication 40 MILLIGRAM(S): at 19:33

## 2019-04-16 RX ADMIN — HYDROMORPHONE HYDROCHLORIDE 0.5 MILLIGRAM(S): 2 INJECTION INTRAMUSCULAR; INTRAVENOUS; SUBCUTANEOUS at 22:17

## 2019-04-16 RX ADMIN — SODIUM CHLORIDE 1000 MILLILITER(S): 9 INJECTION INTRAMUSCULAR; INTRAVENOUS; SUBCUTANEOUS at 19:36

## 2019-04-16 RX ADMIN — SODIUM CHLORIDE 1100 MILLILITER(S): 9 INJECTION INTRAMUSCULAR; INTRAVENOUS; SUBCUTANEOUS at 22:14

## 2019-04-16 RX ADMIN — ATORVASTATIN CALCIUM 10 MILLIGRAM(S): 80 TABLET, FILM COATED ORAL at 23:50

## 2019-04-16 RX ADMIN — HYDROMORPHONE HYDROCHLORIDE 1 MILLIGRAM(S): 2 INJECTION INTRAMUSCULAR; INTRAVENOUS; SUBCUTANEOUS at 19:36

## 2019-04-16 RX ADMIN — CEFEPIME 1000 MILLIGRAM(S): 1 INJECTION, POWDER, FOR SOLUTION INTRAMUSCULAR; INTRAVENOUS at 19:36

## 2019-04-16 RX ADMIN — WARFARIN SODIUM 6 MILLIGRAM(S): 2.5 TABLET ORAL at 23:50

## 2019-04-16 RX ADMIN — Medication 650 MILLIGRAM(S): at 23:57

## 2019-04-16 RX ADMIN — SODIUM CHLORIDE 2200 MILLILITER(S): 9 INJECTION INTRAMUSCULAR; INTRAVENOUS; SUBCUTANEOUS at 19:33

## 2019-04-16 RX ADMIN — HYDROMORPHONE HYDROCHLORIDE 1 MILLIGRAM(S): 2 INJECTION INTRAMUSCULAR; INTRAVENOUS; SUBCUTANEOUS at 18:36

## 2019-04-16 RX ADMIN — Medication 50 MILLIGRAM(S): at 23:49

## 2019-04-16 RX ADMIN — SODIUM CHLORIDE 2000 MILLILITER(S): 9 INJECTION INTRAMUSCULAR; INTRAVENOUS; SUBCUTANEOUS at 18:05

## 2019-04-16 RX ADMIN — SODIUM CHLORIDE 250 MILLILITER(S): 9 INJECTION INTRAMUSCULAR; INTRAVENOUS; SUBCUTANEOUS at 23:55

## 2019-04-16 RX ADMIN — HYDROMORPHONE HYDROCHLORIDE 1 MILLIGRAM(S): 2 INJECTION INTRAMUSCULAR; INTRAVENOUS; SUBCUTANEOUS at 17:55

## 2019-04-16 NOTE — ED STATDOCS - ATTENDING CONTRIBUTION TO CARE
I, Ilia Bennett MD,  performed the initial face to face bedside interview with this patient regarding history of present illness, review of symptoms and relevant past medical, social and family history.  I completed an independent physical examination.  I was the initial provider who evaluated this patient. I have signed out the follow up of any pending tests (i.e. labs, radiological studies) to the ACP.  I have communicated the patient’s plan of care and disposition with the ACP.  The history, relevant review of systems, past medical and surgical history, medical decision making, and physical examination was documented by the scribe in my presence and I attest to the accuracy of the documentation.

## 2019-04-16 NOTE — ED PROVIDER NOTE - PROGRESS NOTE DETAILS
Attending Bennett, I have re-evaluated the patient's fluid status and reviewed vital signs. Clinical evaluation demonstrates an appropriate response to fluid resuscitation.

## 2019-04-16 NOTE — ED ADULT NURSE NOTE - NSIMPLEMENTINTERV_GEN_ALL_ED
Implemented All Fall with Harm Risk Interventions:  [place] to call system. Call bell, personal items and telephone within reach. Instruct patient to call for assistance. Room bathroom lighting operational. Non-slip footwear when patient is off stretcher. Physically safe environment: no spills, clutter or unnecessary equipment. Stretcher in lowest position, wheels locked, appropriate side rails in place. Provide visual cue, wrist band, yellow gown, etc. Monitor gait and stability. Monitor for mental status changes and reorient to person, place, and time. Review medications for side effects contributing to fall risk. Reinforce activity limits and safety measures with patient and family. Provide visual clues: red socks.

## 2019-04-16 NOTE — ED STATDOCS - PMH
Atrial fibrillation    Cellulitis    Congestive heart failure    Diabetes mellitus type II, controlled    Dyspnea    HTN (hypertension)    Morbid obesity with BMI of 40.0-44.9, adult    Neuropathy    Peripheral venous insufficiency    Thyroid nodule

## 2019-04-16 NOTE — ED ADULT TRIAGE NOTE - CHIEF COMPLAINT QUOTE
left lower extremity cellulitis states she has chronic edema and wraps legs,  followed by [facility] and Dr Yoo.  LLE erythema Increased pain and swelling. left lower extremity cellulitis states she has chronic edema and wraps legs,  followed by [facility] and Dr Yoo.  LLE erythema Increased pain and swelling. non fever or chills

## 2019-04-16 NOTE — ED STATDOCS - OBJECTIVE STATEMENT
69 y/o female with a PMHx of Afib on Warfarin, CHF, cellulitis, DM, HTN, neuropathy, peripheral venous insufficiency, thyroid nodule, presents to the ED c/o LE swelling. +leg pain/swelling. No recent falls. No recent injuries. Allergies: Penicillin. PCP: Dr. Mayer. Vascular Surgeon: Dr. Yoo. No other complaints at this time.

## 2019-04-16 NOTE — ED STATDOCS - CONSTITUTIONAL, MLM
normal... well appearing, well nourished, and in no apparent distress. well appearing, well nourished, and in moderate distress due to pain in left leg.

## 2019-04-16 NOTE — ED ADULT NURSE NOTE - CHIEF COMPLAINT QUOTE
left lower extremity cellulitis states she has chronic edema and wraps legs,  followed by [facility] and Dr Yoo.  LLE erythema Increased pain and swelling. non fever or chills

## 2019-04-16 NOTE — ED STATDOCS - CLINICAL SUMMARY MEDICAL DECISION MAKING FREE TEXT BOX
Pt with probable left lower leg cellulitis. Plan: XR, admission. Pt with probable left lower leg cellulitis. Plan: IV abx, labs, admission.

## 2019-04-16 NOTE — H&P ADULT - ASSESSMENT
67 y/o F with PMH of HTN, dyslipidemia, Diastolic CHF, a-fib (on coumadin), chronic venous insufficiency, DM2, thyroid nodule, ulcerative colitis, p/w LLE erythema / pain    *Severe sepsis 2/2 LE cellulitis  -Vanco / Aztreonem  -Blood culture  -ID consult  -Vascular consult  -S/p IVF in ED  -Lactic acidosis -> repeat stat after IVF. Patient is also on metformin, which may be contributing    *Diastolic CHF  -Spoke to ED physician Dr. Bennett who stated that patient was given IV lasix in ED, not because patient was in any respiratory distress, but because it was her home dose of lasix. Will hold further diuretics, as patient's lactate is 3 and respiratory status is at baseline. Will monitor carefully.  -Cardio consult for further management as patient needs fluds for severe sepsis in the setting of CHF    *DM2  -Humalog ISS  -Diabetic diet     *A-fib  -Will increase coumadin dose to 6mg as patient is subtherapeutic     *HTN / dyslipidemia / ulcerative colitis / thyroid nodule   -C/w home meds     *DVT ppx  -On coumadin 67 y/o F with PMH of HTN, dyslipidemia, Diastolic CHF, a-fib (on coumadin), chronic venous insufficiency, DM2, thyroid nodule, ulcerative colitis, p/w LLE erythema / pain    *Severe sepsis 2/2 LE cellulitis  -Vanco / Aztreonem  -Blood culture  -ID consult  -Vascular consult  -S/p IVF in ED  -Lactic acidosis -> repeat stat after IVF. Patient is also on metformin, which may be contributing  -Given acuity described by patient of the changes in LLE, will get CT scan for for further evaluation     *Diastolic CHF  -Spoke to ED physician Dr. Bennett who stated that patient was given IV lasix in ED, not because patient was in any respiratory distress, but because it was her home dose of lasix. Will hold further diuretics, as patient's lactate is 3 and respiratory status is at baseline. Will monitor carefully.  -Cardio consult for further management as patient needs fluds for severe sepsis in the setting of CHF    *DM2  -Humalog ISS  -Diabetic diet     *A-fib  -Will increase coumadin dose to 6mg as patient is subtherapeutic     *HTN / dyslipidemia / ulcerative colitis / thyroid nodule   -C/w home meds     *DVT ppx  -On coumadin 67 y/o F with PMH of HTN, dyslipidemia, Diastolic CHF, a-fib (on coumadin), chronic venous insufficiency, DM2, thyroid nodule, ulcerative colitis, p/w LLE erythema / pain    *Severe sepsis 2/2 LE cellulitis  -Vanco / Aztreonem  -Blood culture  -ID consult  -Vascular consult  -S/p IVF in ED  -Lactic acidosis -> repeat stat after IVF. Patient is also on metformin, which may be contributing  -Given acuity described by patient of the changes in LLE, will get CT scan for for further evaluation     *Diastolic CHF  -Spoke to ED physician Dr. Bennett who stated that patient was given IV lasix in ED, not because patient was in any respiratory distress, but because it was her home dose of lasix. Will hold further diuretics, as patient's lactate is 3 and respiratory status is at baseline. Will monitor carefully.  -Cardio consult for further management as patient needs fluds for severe sepsis in the setting of CHF    *DM2  -Humalog ISS  -Diabetic diet     *A-fib  -Will increase coumadin dose to 6mg as patient is subtherapeutic     *HTN / dyslipidemia / ulcerative colitis / thyroid nodule   -C/w home meds and f/u outpatient for further management     *DVT ppx  -On coumadin

## 2019-04-16 NOTE — ED STATDOCS - SKIN, MLM
skin normal color for race, warm, dry and intact. Significant left lower leg redness. +2 pedal edema, Warm to touch. TTP lower leg. . Significant left lower leg redness. +2 pedal edema, Warm to touch. TTP lower leg.

## 2019-04-16 NOTE — ED STATDOCS - PROGRESS NOTE DETAILS
68 yr. old female PMH: A-Fib currently on Wafarin, CHF,Cellulitis, Type 2 Diabetes, HTN, PVD 68 yr. old female PMH: A-Fib currently on Wafarin, CHF,Cellulitis, Type 2 Diabetes, HTN, PVDpresents to ED with lower extremity swelling ,leg pain. No recent trauma. Vascular surgeon Dr. Yoo. Seen and examined by attending in Intake. Plan: IV, labs, US ,X-Ray and antibiotics. Will F/U with results and re evaluate. Chandan NP Elevated Lactate Calculated IV fluid adjustment used to order fluids. Patients BMI more then 30, therefore 30cc/kg fluid bolus based on Ideal body weight, Attending Bennett. Attending Bennett, I have re-evaluated the patient's fluid status and reviewed vital signs. Clinical evaluation demonstrates an appropriate response to fluid resuscitation.

## 2019-04-16 NOTE — H&P ADULT - HISTORY OF PRESENT ILLNESS
67 y/o F with PMH of HTN, dyslipidemia, Diastolic CHF, a-fib (on coumadin), chronic venous insufficiency, DM2, thyroid nodule, ulcerative colitis, p/w LLE erythema / pain. Patient states when she woke up she felt fine, and then she went to work and around 10:30am she started to feel burning pain on LLE. Also noticed LE was erythematous, warm and more swollen than usual. Denies any changes in RLE. +Subjective fevers +feels cold. Denies CP, SOB, cough, runny nose, sore throat, fever, chills, nausea, vomiting, abdominal pain, dysuria, increased urinary frequency.     PSH: gastric band, knee surgery     Family Hx: Mother - breast cancer     Social Hx: Former smoker - quit >30 years ago, denies etoh / drugs

## 2019-04-17 LAB
ALBUMIN SERPL ELPH-MCNC: 2.4 G/DL — LOW (ref 3.3–5)
ALP SERPL-CCNC: 70 U/L — SIGNIFICANT CHANGE UP (ref 40–120)
ALT FLD-CCNC: 47 U/L — SIGNIFICANT CHANGE UP (ref 12–78)
ANION GAP SERPL CALC-SCNC: 10 MMOL/L — SIGNIFICANT CHANGE UP (ref 5–17)
APTT BLD: 31.6 SEC — SIGNIFICANT CHANGE UP (ref 27.5–36.3)
AST SERPL-CCNC: 38 U/L — HIGH (ref 15–37)
BASOPHILS # BLD AUTO: 0.05 K/UL — SIGNIFICANT CHANGE UP (ref 0–0.2)
BASOPHILS NFR BLD AUTO: 0.3 % — SIGNIFICANT CHANGE UP (ref 0–2)
BILIRUB SERPL-MCNC: 0.9 MG/DL — SIGNIFICANT CHANGE UP (ref 0.2–1.2)
BUN SERPL-MCNC: 9 MG/DL — SIGNIFICANT CHANGE UP (ref 7–23)
CALCIUM SERPL-MCNC: 8.2 MG/DL — LOW (ref 8.5–10.1)
CHLORIDE SERPL-SCNC: 102 MMOL/L — SIGNIFICANT CHANGE UP (ref 96–108)
CO2 SERPL-SCNC: 28 MMOL/L — SIGNIFICANT CHANGE UP (ref 22–31)
CREAT SERPL-MCNC: 0.5 MG/DL — SIGNIFICANT CHANGE UP (ref 0.5–1.3)
CRP SERPL-MCNC: 5.55 MG/DL — HIGH (ref 0–0.4)
CULTURE RESULTS: NO GROWTH — SIGNIFICANT CHANGE UP
EOSINOPHIL # BLD AUTO: 0.04 K/UL — SIGNIFICANT CHANGE UP (ref 0–0.5)
EOSINOPHIL NFR BLD AUTO: 0.3 % — SIGNIFICANT CHANGE UP (ref 0–6)
GLUCOSE BLDC GLUCOMTR-MCNC: 129 MG/DL — HIGH (ref 70–99)
GLUCOSE BLDC GLUCOMTR-MCNC: 140 MG/DL — HIGH (ref 70–99)
GLUCOSE BLDC GLUCOMTR-MCNC: 149 MG/DL — HIGH (ref 70–99)
GLUCOSE BLDC GLUCOMTR-MCNC: 194 MG/DL — HIGH (ref 70–99)
GLUCOSE SERPL-MCNC: 136 MG/DL — HIGH (ref 70–99)
HBA1C BLD-MCNC: 6.6 % — HIGH (ref 4–5.6)
HCT VFR BLD CALC: 39 % — SIGNIFICANT CHANGE UP (ref 34.5–45)
HCV AB S/CO SERPL IA: 0.07 S/CO — SIGNIFICANT CHANGE UP (ref 0–0.99)
HCV AB SERPL-IMP: SIGNIFICANT CHANGE UP
HGB BLD-MCNC: 12.8 G/DL — SIGNIFICANT CHANGE UP (ref 11.5–15.5)
IMM GRANULOCYTES NFR BLD AUTO: 0.5 % — SIGNIFICANT CHANGE UP (ref 0–1.5)
INR BLD: 1.75 RATIO — HIGH (ref 0.88–1.16)
LACTATE SERPL-SCNC: 1.3 MMOL/L — SIGNIFICANT CHANGE UP (ref 0.7–2)
LACTATE SERPL-SCNC: 1.4 MMOL/L — SIGNIFICANT CHANGE UP (ref 0.7–2)
LYMPHOCYTES # BLD AUTO: 1.19 K/UL — SIGNIFICANT CHANGE UP (ref 1–3.3)
LYMPHOCYTES # BLD AUTO: 7.5 % — LOW (ref 13–44)
MAGNESIUM SERPL-MCNC: 1.6 MG/DL — SIGNIFICANT CHANGE UP (ref 1.6–2.6)
MCHC RBC-ENTMCNC: 30.8 PG — SIGNIFICANT CHANGE UP (ref 27–34)
MCHC RBC-ENTMCNC: 32.8 GM/DL — SIGNIFICANT CHANGE UP (ref 32–36)
MCV RBC AUTO: 93.8 FL — SIGNIFICANT CHANGE UP (ref 80–100)
MONOCYTES # BLD AUTO: 0.89 K/UL — SIGNIFICANT CHANGE UP (ref 0–0.9)
MONOCYTES NFR BLD AUTO: 5.6 % — SIGNIFICANT CHANGE UP (ref 2–14)
NEUTROPHILS # BLD AUTO: 13.64 K/UL — HIGH (ref 1.8–7.4)
NEUTROPHILS NFR BLD AUTO: 85.8 % — HIGH (ref 43–77)
NRBC # BLD: 0 /100 WBCS — SIGNIFICANT CHANGE UP (ref 0–0)
PHOSPHATE SERPL-MCNC: 3.4 MG/DL — SIGNIFICANT CHANGE UP (ref 2.5–4.5)
PLATELET # BLD AUTO: 277 K/UL — SIGNIFICANT CHANGE UP (ref 150–400)
POTASSIUM SERPL-MCNC: 3 MMOL/L — LOW (ref 3.5–5.3)
POTASSIUM SERPL-SCNC: 3 MMOL/L — LOW (ref 3.5–5.3)
PROT SERPL-MCNC: 6.3 GM/DL — SIGNIFICANT CHANGE UP (ref 6–8.3)
PROTHROM AB SERPL-ACNC: 19.8 SEC — HIGH (ref 10–12.9)
RBC # BLD: 4.16 M/UL — SIGNIFICANT CHANGE UP (ref 3.8–5.2)
RBC # FLD: 15.4 % — HIGH (ref 10.3–14.5)
SODIUM SERPL-SCNC: 140 MMOL/L — SIGNIFICANT CHANGE UP (ref 135–145)
SPECIMEN SOURCE: SIGNIFICANT CHANGE UP
WBC # BLD: 15.89 K/UL — HIGH (ref 3.8–10.5)
WBC # FLD AUTO: 15.89 K/UL — HIGH (ref 3.8–10.5)

## 2019-04-17 PROCEDURE — 99221 1ST HOSP IP/OBS SF/LOW 40: CPT

## 2019-04-17 RX ORDER — HEPARIN SODIUM 5000 [USP'U]/ML
5000 INJECTION INTRAVENOUS; SUBCUTANEOUS EVERY 8 HOURS
Qty: 0 | Refills: 0 | Status: DISCONTINUED | OUTPATIENT
Start: 2019-04-17 | End: 2019-04-19

## 2019-04-17 RX ORDER — POTASSIUM CHLORIDE 20 MEQ
40 PACKET (EA) ORAL EVERY 4 HOURS
Qty: 0 | Refills: 0 | Status: COMPLETED | OUTPATIENT
Start: 2019-04-17 | End: 2019-04-17

## 2019-04-17 RX ORDER — HYDROMORPHONE HYDROCHLORIDE 2 MG/ML
1 INJECTION INTRAMUSCULAR; INTRAVENOUS; SUBCUTANEOUS EVERY 4 HOURS
Qty: 0 | Refills: 0 | Status: DISCONTINUED | OUTPATIENT
Start: 2019-04-17 | End: 2019-04-18

## 2019-04-17 RX ORDER — WARFARIN SODIUM 2.5 MG/1
7.5 TABLET ORAL ONCE
Qty: 0 | Refills: 0 | Status: COMPLETED | OUTPATIENT
Start: 2019-04-17 | End: 2019-04-17

## 2019-04-17 RX ORDER — FUROSEMIDE 40 MG
40 TABLET ORAL DAILY
Qty: 0 | Refills: 0 | Status: DISCONTINUED | OUTPATIENT
Start: 2019-04-17 | End: 2019-05-06

## 2019-04-17 RX ORDER — HYDROMORPHONE HYDROCHLORIDE 2 MG/ML
0.5 INJECTION INTRAMUSCULAR; INTRAVENOUS; SUBCUTANEOUS ONCE
Qty: 0 | Refills: 0 | Status: DISCONTINUED | OUTPATIENT
Start: 2019-04-17 | End: 2019-04-17

## 2019-04-17 RX ORDER — CEFEPIME 1 G/1
2000 INJECTION, POWDER, FOR SOLUTION INTRAMUSCULAR; INTRAVENOUS EVERY 12 HOURS
Qty: 0 | Refills: 0 | Status: DISCONTINUED | OUTPATIENT
Start: 2019-04-17 | End: 2019-04-29

## 2019-04-17 RX ORDER — NYSTATIN CREAM 100000 [USP'U]/G
1 CREAM TOPICAL THREE TIMES A DAY
Qty: 0 | Refills: 0 | Status: DISCONTINUED | OUTPATIENT
Start: 2019-04-17 | End: 2019-05-06

## 2019-04-17 RX ORDER — OXYCODONE HYDROCHLORIDE 5 MG/1
10 TABLET ORAL EVERY 4 HOURS
Qty: 0 | Refills: 0 | Status: DISCONTINUED | OUTPATIENT
Start: 2019-04-17 | End: 2019-04-18

## 2019-04-17 RX ORDER — OXYCODONE HYDROCHLORIDE 5 MG/1
5 TABLET ORAL EVERY 4 HOURS
Qty: 0 | Refills: 0 | Status: DISCONTINUED | OUTPATIENT
Start: 2019-04-17 | End: 2019-04-17

## 2019-04-17 RX ORDER — VANCOMYCIN HCL 1 G
1000 VIAL (EA) INTRAVENOUS EVERY 12 HOURS
Qty: 0 | Refills: 0 | Status: DISCONTINUED | OUTPATIENT
Start: 2019-04-17 | End: 2019-04-19

## 2019-04-17 RX ADMIN — HYDROMORPHONE HYDROCHLORIDE 0.5 MILLIGRAM(S): 2 INJECTION INTRAMUSCULAR; INTRAVENOUS; SUBCUTANEOUS at 00:28

## 2019-04-17 RX ADMIN — NYSTATIN CREAM 1 APPLICATION(S): 100000 CREAM TOPICAL at 13:47

## 2019-04-17 RX ADMIN — HYDROMORPHONE HYDROCHLORIDE 1 MILLIGRAM(S): 2 INJECTION INTRAMUSCULAR; INTRAVENOUS; SUBCUTANEOUS at 21:40

## 2019-04-17 RX ADMIN — HYDROMORPHONE HYDROCHLORIDE 0.5 MILLIGRAM(S): 2 INJECTION INTRAMUSCULAR; INTRAVENOUS; SUBCUTANEOUS at 01:42

## 2019-04-17 RX ADMIN — HEPARIN SODIUM 5000 UNIT(S): 5000 INJECTION INTRAVENOUS; SUBCUTANEOUS at 13:47

## 2019-04-17 RX ADMIN — HYDROMORPHONE HYDROCHLORIDE 0.5 MILLIGRAM(S): 2 INJECTION INTRAMUSCULAR; INTRAVENOUS; SUBCUTANEOUS at 04:24

## 2019-04-17 RX ADMIN — Medication 100 MILLIGRAM(S): at 05:31

## 2019-04-17 RX ADMIN — Medication 250 MILLIGRAM(S): at 17:51

## 2019-04-17 RX ADMIN — Medication 500 MILLIGRAM(S): at 11:45

## 2019-04-17 RX ADMIN — NYSTATIN CREAM 1 APPLICATION(S): 100000 CREAM TOPICAL at 09:45

## 2019-04-17 RX ADMIN — Medication 40 MILLIEQUIVALENT(S): at 13:47

## 2019-04-17 RX ADMIN — HYDROMORPHONE HYDROCHLORIDE 1 MILLIGRAM(S): 2 INJECTION INTRAMUSCULAR; INTRAVENOUS; SUBCUTANEOUS at 21:03

## 2019-04-17 RX ADMIN — GABAPENTIN 300 MILLIGRAM(S): 400 CAPSULE ORAL at 11:45

## 2019-04-17 RX ADMIN — Medication 40 MILLIGRAM(S): at 17:51

## 2019-04-17 RX ADMIN — OXYCODONE HYDROCHLORIDE 10 MILLIGRAM(S): 5 TABLET ORAL at 16:29

## 2019-04-17 RX ADMIN — HYDROMORPHONE HYDROCHLORIDE 1 MILLIGRAM(S): 2 INJECTION INTRAMUSCULAR; INTRAVENOUS; SUBCUTANEOUS at 09:13

## 2019-04-17 RX ADMIN — Medication 650 MILLIGRAM(S): at 00:33

## 2019-04-17 RX ADMIN — MONTELUKAST 10 MILLIGRAM(S): 4 TABLET, CHEWABLE ORAL at 21:04

## 2019-04-17 RX ADMIN — OXYCODONE HYDROCHLORIDE 10 MILLIGRAM(S): 5 TABLET ORAL at 23:36

## 2019-04-17 RX ADMIN — HYDROMORPHONE HYDROCHLORIDE 1 MILLIGRAM(S): 2 INJECTION INTRAMUSCULAR; INTRAVENOUS; SUBCUTANEOUS at 14:34

## 2019-04-17 RX ADMIN — Medication 50 MILLIGRAM(S): at 05:31

## 2019-04-17 RX ADMIN — NYSTATIN CREAM 1 APPLICATION(S): 100000 CREAM TOPICAL at 21:05

## 2019-04-17 RX ADMIN — Medication 120 MILLIGRAM(S): at 16:47

## 2019-04-17 RX ADMIN — WARFARIN SODIUM 7.5 MILLIGRAM(S): 2.5 TABLET ORAL at 21:05

## 2019-04-17 RX ADMIN — Medication 40 MILLIEQUIVALENT(S): at 09:45

## 2019-04-17 RX ADMIN — ATORVASTATIN CALCIUM 10 MILLIGRAM(S): 80 TABLET, FILM COATED ORAL at 21:04

## 2019-04-17 RX ADMIN — Medication 120 MILLIGRAM(S): at 05:31

## 2019-04-17 RX ADMIN — CEFEPIME 100 MILLIGRAM(S): 1 INJECTION, POWDER, FOR SOLUTION INTRAMUSCULAR; INTRAVENOUS at 16:48

## 2019-04-17 RX ADMIN — HEPARIN SODIUM 5000 UNIT(S): 5000 INJECTION INTRAVENOUS; SUBCUTANEOUS at 21:04

## 2019-04-17 RX ADMIN — CEFEPIME 100 MILLIGRAM(S): 1 INJECTION, POWDER, FOR SOLUTION INTRAMUSCULAR; INTRAVENOUS at 11:38

## 2019-04-17 RX ADMIN — Medication 81 MILLIGRAM(S): at 11:45

## 2019-04-17 NOTE — CONSULT NOTE ADULT - SUBJECTIVE AND OBJECTIVE BOX
Patient is a 68y old  Female who presents with a chief complaint of LLE erythema / pain (2019 20:32)    HPI:  69 y/o Female with h/o obesity, HTN, dyslipidemia, Diastolic CHF, A.fib (on coumadin), chronic venous insufficiency, DM type 2, thyroid nodule, ulcerative colitis was admitted for worsening LLE erythema / pain. Patient states she started to feel burning pain on LLE associated with LLE erythema, warm and more swollen than usual. She reports subjective fevers and feeling cold. In ER, she received vancomycin IV and aztreonam.      PMH: as above  PSH: as above  Meds: per reconciliation sheet, noted below  MEDICATIONS  (STANDING):  ascorbic acid 500 milliGRAM(s) Oral daily  aspirin  chewable 81 milliGRAM(s) Oral daily  atorvastatin 10 milliGRAM(s) Oral at bedtime  aztreonam  IVPB 500 milliGRAM(s) IV Intermittent every 8 hours  dextrose 5%. 1000 milliLiter(s) (50 mL/Hr) IV Continuous <Continuous>  dextrose 50% Injectable 12.5 Gram(s) IV Push once  dextrose 50% Injectable 25 Gram(s) IV Push once  dextrose 50% Injectable 25 Gram(s) IV Push once  diltiazem    Tablet 120 milliGRAM(s) Oral two times a day  gabapentin 300 milliGRAM(s) Oral daily  heparin  Injectable 5000 Unit(s) SubCutaneous every 8 hours  insulin lispro (HumaLOG) corrective regimen sliding scale   SubCutaneous three times a day before meals  metoprolol succinate  milliGRAM(s) Oral daily  montelukast 10 milliGRAM(s) Oral at bedtime  nystatin Powder 1 Application(s) Topical three times a day  potassium chloride    Tablet ER 40 milliEquivalent(s) Oral every 4 hours  warfarin 7.5 milliGRAM(s) Oral once    MEDICATIONS  (PRN):  acetaminophen   Tablet .. 650 milliGRAM(s) Oral every 6 hours PRN Temp greater or equal to 38C (100.4F)  dextrose 40% Gel 15 Gram(s) Oral once PRN Blood Glucose LESS THAN 70 milliGRAM(s)/deciliter  docusate sodium 100 milliGRAM(s) Oral three times a day PRN Constipation  glucagon  Injectable 1 milliGRAM(s) IntraMuscular once PRN Glucose LESS THAN 70 milligrams/deciliter  HYDROmorphone  Injectable 1 milliGRAM(s) IV Push every 4 hours PRN Severe Pain (7 - 10)  HYDROmorphone  Injectable 0.5 milliGRAM(s) IV Push every 6 hours PRN Moderate Pain (4 - 6)  zolpidem 5 milliGRAM(s) Oral at bedtime PRN Insomnia    Allergies    penicillin (Hives); tolerated cefepime in the past    Intolerances      Social: prior smoking, no alcohol, no illegal drugs; no recent travel, no exposure to TB  FAMILY HISTORY:  Family history of diabetes mellitus in mother (Mother)  Family history of breast cancer in mother (Mother)    no history of premature cardiovascular disease in first degree relatives    ROS: the patient denies HA, no seizures, no dizziness, no sore throat, no nasal congestion, no blurry vision, no CP, no palpitations, no SOB, no cough, no abdominal pain, no diarrhea, no N/V, no dysuria, has left leg pain, no claudication, has legs rash, no joint aches, no rectal pain or bleeding, no night sweats  All other systems reviewed and are negative    Vital Signs Last 24 Hrs  T(C): 36.9 (2019 04:49), Max: 37.6 (2019 17:08)  T(F): 98.4 (2019 04:49), Max: 99.6 (2019 17:08)  HR: 109 (2019 04:49) (101 - 110)  BP: 120/61 (2019 04:49) (120/61 - 168/77)  BP(mean): --  RR: 18 (2019 04:49) (18 - 21)  SpO2: 96% (2019 04:49) (96% - 99%)  Daily Height in cm: 177.8 (2019 16:57)    Daily     PE:    Constitutional: frail looking  HEENT: NC/AT, EOMI, PERRLA, conjunctivae clear; ears and nose atraumatic; pharynx benign  Neck: supple; thyroid not palpable  Back: no tenderness  Respiratory: respiratory effort normal; clear to auscultation  Cardiovascular: S1S2 regular, no murmurs  Abdomen: soft, not tender, not distended, positive BS; no liver or spleen organomegaly  Genitourinary: no suprapubic tenderness  Lymphatic: no LN palpable  Musculoskeletal: no muscle tenderness, no joint swelling or tenderness  Extremities: 2+ pedal edema  LLE erythema, edema, tenderness, warmth over left ankle, shin and calf; broken skin with scant discharge  RLE chronic stasis skin changes  Neurological/ Psychiatric: AxOx3, judgement and insight normal; moving all extremities  Skin: no rashes; no palpable lesions    Labs: all available labs reviewed                        12.8   15.89 )-----------( 277      ( 2019 06:01 )             39.0     -    140  |  102  |  9   ----------------------------<  136<H>  3.0<L>   |  28  |  0.50    Ca    8.2<L>      2019 06:01  Phos  3.4       Mg     1.6         TPro  6.3  /  Alb  2.4<L>  /  TBili  0.9  /  DBili  x   /  AST  38<H>  /  ALT  47  /  AlkPhos  70       LIVER FUNCTIONS - ( 2019 06:01 )  Alb: 2.4 g/dL / Pro: 6.3 gm/dL / ALK PHOS: 70 U/L / ALT: 47 U/L / AST: 38 U/L / GGT: x           Urinalysis Basic - ( 2019 20:35 )    Color: Yellow / Appearance: Clear / S.005 / pH: x  Gluc: x / Ketone: Negative  / Bili: Negative / Urobili: Negative mg/dL   Blood: x / Protein: Negative mg/dL / Nitrite: Negative   Leuk Esterase: Negative / RBC: x / WBC x   Sq Epi: x / Non Sq Epi: x / Bacteria: x          Radiology: all available radiological tests reviewed    Advanced directives addressed: full resuscitation

## 2019-04-17 NOTE — CONSULT NOTE ADULT - ASSESSMENT
69 y/o Female with h/o obesity, HTN, dyslipidemia, Diastolic CHF, A.fib (on coumadin), chronic venous insufficiency, DM type 2, thyroid nodule, ulcerative colitis was admitted for worsening LLE erythema / pain. Patient states she started to feel burning pain on LLE associated with LLE erythema, warm and more swollen than usual. She reports subjective fevers and feeling cold. In ER, she received vancomycin IV and aztreonam.    1. LLE cellulitis. B/L LE chronic stasis dermatitis. Obesity. Allergy to PCN.  -leukocytosis  -obtain BC x 2   -start vancomycin 1 gm IV q12h and cefepime 2 gm IV q12h   -reason for abx use and side effects reviewed with patient; monitor BMP and vancomycin trough levels  -monitor closely in ej of PCN allergy history   -elevate legs  -old chart reviewed to assess prior cultures  -monitor temps  -f/u CBC  -supportive care  2. Other issues:   -care per medicine

## 2019-04-17 NOTE — CONSULT NOTE ADULT - ATTENDING COMMENTS
68 year old female with history of severe chronic venous insufficiency with multiple previous admissions for bilateral leg ulcers and cellulitis now presenting with left leg cellulitis. There is no evidence of abscess or necrotizing STI  -Recommend aggressive compression therapy. Will start with daily ace compression and transition to Unna boot when cellulitis is better controlled.  -Continue intravenous antibiotics  -Left leg elevation while in bed.  -Consider restarting lasix, patient has underlying CHF that may aggravate leg swelling and celllulitis.  -Will follow

## 2019-04-18 LAB
ANION GAP SERPL CALC-SCNC: 7 MMOL/L — SIGNIFICANT CHANGE UP (ref 5–17)
BUN SERPL-MCNC: 8 MG/DL — SIGNIFICANT CHANGE UP (ref 7–23)
CALCIUM SERPL-MCNC: 8.4 MG/DL — LOW (ref 8.5–10.1)
CHLORIDE SERPL-SCNC: 101 MMOL/L — SIGNIFICANT CHANGE UP (ref 96–108)
CO2 SERPL-SCNC: 28 MMOL/L — SIGNIFICANT CHANGE UP (ref 22–31)
CREAT SERPL-MCNC: 0.5 MG/DL — SIGNIFICANT CHANGE UP (ref 0.5–1.3)
GLUCOSE BLDC GLUCOMTR-MCNC: 147 MG/DL — HIGH (ref 70–99)
GLUCOSE BLDC GLUCOMTR-MCNC: 170 MG/DL — HIGH (ref 70–99)
GLUCOSE BLDC GLUCOMTR-MCNC: 177 MG/DL — HIGH (ref 70–99)
GLUCOSE BLDC GLUCOMTR-MCNC: 195 MG/DL — HIGH (ref 70–99)
GLUCOSE SERPL-MCNC: 140 MG/DL — HIGH (ref 70–99)
HCT VFR BLD CALC: 38.6 % — SIGNIFICANT CHANGE UP (ref 34.5–45)
HGB BLD-MCNC: 12.4 G/DL — SIGNIFICANT CHANGE UP (ref 11.5–15.5)
INR BLD: 1.78 RATIO — HIGH (ref 0.88–1.16)
MCHC RBC-ENTMCNC: 30.4 PG — SIGNIFICANT CHANGE UP (ref 27–34)
MCHC RBC-ENTMCNC: 32.1 GM/DL — SIGNIFICANT CHANGE UP (ref 32–36)
MCV RBC AUTO: 94.6 FL — SIGNIFICANT CHANGE UP (ref 80–100)
NRBC # BLD: 0 /100 WBCS — SIGNIFICANT CHANGE UP (ref 0–0)
PLATELET # BLD AUTO: 257 K/UL — SIGNIFICANT CHANGE UP (ref 150–400)
POTASSIUM SERPL-MCNC: 3.5 MMOL/L — SIGNIFICANT CHANGE UP (ref 3.5–5.3)
POTASSIUM SERPL-SCNC: 3.5 MMOL/L — SIGNIFICANT CHANGE UP (ref 3.5–5.3)
PROTHROM AB SERPL-ACNC: 20.1 SEC — HIGH (ref 10–12.9)
RBC # BLD: 4.08 M/UL — SIGNIFICANT CHANGE UP (ref 3.8–5.2)
RBC # FLD: 15.4 % — HIGH (ref 10.3–14.5)
SODIUM SERPL-SCNC: 136 MMOL/L — SIGNIFICANT CHANGE UP (ref 135–145)
TROPONIN I SERPL-MCNC: <0.015 NG/ML — SIGNIFICANT CHANGE UP (ref 0.01–0.04)
WBC # BLD: 11.37 K/UL — HIGH (ref 3.8–10.5)
WBC # FLD AUTO: 11.37 K/UL — HIGH (ref 3.8–10.5)

## 2019-04-18 PROCEDURE — 93010 ELECTROCARDIOGRAM REPORT: CPT

## 2019-04-18 RX ORDER — WARFARIN SODIUM 2.5 MG/1
7.5 TABLET ORAL ONCE
Qty: 0 | Refills: 0 | Status: COMPLETED | OUTPATIENT
Start: 2019-04-18 | End: 2019-04-18

## 2019-04-18 RX ORDER — DIPHENHYDRAMINE HCL 50 MG
25 CAPSULE ORAL ONCE
Qty: 0 | Refills: 0 | Status: COMPLETED | OUTPATIENT
Start: 2019-04-18 | End: 2019-04-18

## 2019-04-18 RX ORDER — POTASSIUM CHLORIDE 20 MEQ
40 PACKET (EA) ORAL ONCE
Qty: 0 | Refills: 0 | Status: COMPLETED | OUTPATIENT
Start: 2019-04-18 | End: 2019-04-18

## 2019-04-18 RX ORDER — HYDROMORPHONE HYDROCHLORIDE 2 MG/ML
2 INJECTION INTRAMUSCULAR; INTRAVENOUS; SUBCUTANEOUS EVERY 4 HOURS
Qty: 0 | Refills: 0 | Status: DISCONTINUED | OUTPATIENT
Start: 2019-04-18 | End: 2019-04-25

## 2019-04-18 RX ADMIN — OXYCODONE HYDROCHLORIDE 10 MILLIGRAM(S): 5 TABLET ORAL at 08:07

## 2019-04-18 RX ADMIN — Medication 25 MILLIGRAM(S): at 22:54

## 2019-04-18 RX ADMIN — HYDROMORPHONE HYDROCHLORIDE 2 MILLIGRAM(S): 2 INJECTION INTRAMUSCULAR; INTRAVENOUS; SUBCUTANEOUS at 15:40

## 2019-04-18 RX ADMIN — Medication 40 MILLIGRAM(S): at 06:43

## 2019-04-18 RX ADMIN — Medication 120 MILLIGRAM(S): at 17:28

## 2019-04-18 RX ADMIN — Medication 250 MILLIGRAM(S): at 17:31

## 2019-04-18 RX ADMIN — GABAPENTIN 300 MILLIGRAM(S): 400 CAPSULE ORAL at 11:31

## 2019-04-18 RX ADMIN — CEFEPIME 100 MILLIGRAM(S): 1 INJECTION, POWDER, FOR SOLUTION INTRAMUSCULAR; INTRAVENOUS at 06:42

## 2019-04-18 RX ADMIN — OXYCODONE HYDROCHLORIDE 10 MILLIGRAM(S): 5 TABLET ORAL at 06:51

## 2019-04-18 RX ADMIN — OXYCODONE HYDROCHLORIDE 10 MILLIGRAM(S): 5 TABLET ORAL at 04:22

## 2019-04-18 RX ADMIN — HYDROMORPHONE HYDROCHLORIDE 1 MILLIGRAM(S): 2 INJECTION INTRAMUSCULAR; INTRAVENOUS; SUBCUTANEOUS at 01:50

## 2019-04-18 RX ADMIN — Medication 500 MILLIGRAM(S): at 11:30

## 2019-04-18 RX ADMIN — Medication 40 MILLIEQUIVALENT(S): at 14:52

## 2019-04-18 RX ADMIN — HYDROMORPHONE HYDROCHLORIDE 2 MILLIGRAM(S): 2 INJECTION INTRAMUSCULAR; INTRAVENOUS; SUBCUTANEOUS at 11:30

## 2019-04-18 RX ADMIN — WARFARIN SODIUM 7.5 MILLIGRAM(S): 2.5 TABLET ORAL at 21:17

## 2019-04-18 RX ADMIN — CEFEPIME 100 MILLIGRAM(S): 1 INJECTION, POWDER, FOR SOLUTION INTRAMUSCULAR; INTRAVENOUS at 17:31

## 2019-04-18 RX ADMIN — Medication 120 MILLIGRAM(S): at 06:43

## 2019-04-18 RX ADMIN — Medication 1: at 17:27

## 2019-04-18 RX ADMIN — OXYCODONE HYDROCHLORIDE 10 MILLIGRAM(S): 5 TABLET ORAL at 00:14

## 2019-04-18 RX ADMIN — HEPARIN SODIUM 5000 UNIT(S): 5000 INJECTION INTRAVENOUS; SUBCUTANEOUS at 06:42

## 2019-04-18 RX ADMIN — NYSTATIN CREAM 1 APPLICATION(S): 100000 CREAM TOPICAL at 14:53

## 2019-04-18 RX ADMIN — HYDROMORPHONE HYDROCHLORIDE 2 MILLIGRAM(S): 2 INJECTION INTRAMUSCULAR; INTRAVENOUS; SUBCUTANEOUS at 16:00

## 2019-04-18 RX ADMIN — NYSTATIN CREAM 1 APPLICATION(S): 100000 CREAM TOPICAL at 06:44

## 2019-04-18 RX ADMIN — Medication 1: at 12:13

## 2019-04-18 RX ADMIN — HYDROMORPHONE HYDROCHLORIDE 2 MILLIGRAM(S): 2 INJECTION INTRAMUSCULAR; INTRAVENOUS; SUBCUTANEOUS at 12:30

## 2019-04-18 RX ADMIN — OXYCODONE HYDROCHLORIDE 10 MILLIGRAM(S): 5 TABLET ORAL at 09:00

## 2019-04-18 RX ADMIN — Medication 81 MILLIGRAM(S): at 11:30

## 2019-04-18 RX ADMIN — Medication 650 MILLIGRAM(S): at 06:42

## 2019-04-18 RX ADMIN — HEPARIN SODIUM 5000 UNIT(S): 5000 INJECTION INTRAVENOUS; SUBCUTANEOUS at 21:17

## 2019-04-18 RX ADMIN — ATORVASTATIN CALCIUM 10 MILLIGRAM(S): 80 TABLET, FILM COATED ORAL at 21:17

## 2019-04-18 RX ADMIN — MONTELUKAST 10 MILLIGRAM(S): 4 TABLET, CHEWABLE ORAL at 21:17

## 2019-04-18 RX ADMIN — HEPARIN SODIUM 5000 UNIT(S): 5000 INJECTION INTRAVENOUS; SUBCUTANEOUS at 14:50

## 2019-04-18 RX ADMIN — Medication 100 MILLIGRAM(S): at 06:42

## 2019-04-18 RX ADMIN — HYDROMORPHONE HYDROCHLORIDE 2 MILLIGRAM(S): 2 INJECTION INTRAMUSCULAR; INTRAVENOUS; SUBCUTANEOUS at 21:16

## 2019-04-18 RX ADMIN — HYDROMORPHONE HYDROCHLORIDE 2 MILLIGRAM(S): 2 INJECTION INTRAMUSCULAR; INTRAVENOUS; SUBCUTANEOUS at 22:00

## 2019-04-18 RX ADMIN — HYDROMORPHONE HYDROCHLORIDE 1 MILLIGRAM(S): 2 INJECTION INTRAMUSCULAR; INTRAVENOUS; SUBCUTANEOUS at 01:12

## 2019-04-18 RX ADMIN — Medication 250 MILLIGRAM(S): at 06:42

## 2019-04-18 RX ADMIN — NYSTATIN CREAM 1 APPLICATION(S): 100000 CREAM TOPICAL at 21:18

## 2019-04-18 NOTE — CHART NOTE - NSCHARTNOTEFT_GEN_A_CORE
69 y/o F with PMH of HTN, dyslipidemia, Diastolic CHF, a-fib (on coumadin), chronic venous insufficiency, DM2, thyroid nodule, ulcerative colitis, p/w LLE erythema / pain.    Called by RN to evaluate pt with c/o chest discomfort with rapid heart rate x 20 minutes.   Patient seen and examined at bedside, reports vague chest discomfort and nausea but no shortness of breath. Reports that she is having leg pain but she is afraid of taking too much pain meds. Denies fever, chills, wheezing, cough, abd pain or vomiting.    Vital Signs Last 24 Hrs  T(F): 100.4  HR: 113  BP: 156/78  RR: 18   SpO2: 99%     Gen: alert and oriented x 3, no acute distress  Cardiac: S1 S2 irregular  Lungs: good air entry b/l  Abd: + BS, soft, NT, ND  Ext: Ace wrapping b/l, sensation intact    Chest pain   -EKG performed demonstrated A. Fib with RVR @ 105 bpm  -BMP, Troponin, INR STAT  -Pain control  -Continue with Coumadin and heparin   -Continue with Cardizem and Metoprolol    Pt reassessed, after receiving pain meds, morning dose of Cardizem and Metoprolol, symptoms resolved. Chest pain resolved.   Will f/u labs 69 y/o F with PMH of HTN, dyslipidemia, Diastolic CHF, a-fib (on coumadin), chronic venous insufficiency, DM2, thyroid nodule, ulcerative colitis, p/w LLE erythema / pain.    Called by RN to evaluate pt with c/o chest discomfort with rapid heart rate x 20 minutes.   Patient seen and examined at bedside, reports vague chest discomfort and nausea but no shortness of breath. Reports that she is having leg pain but she is afraid of taking too much pain meds. Denies fever, chills, wheezing, cough, abd pain or vomiting.    Vital Signs Last 24 Hrs  T(F): 100.4  HR: 113  BP: 156/78  RR: 18   SpO2: 99%     Gen: alert and oriented x 3, no acute distress  Cardiac: S1 S2 irregular  Lungs: good air entry b/l  Abd: + BS, soft, NT, ND  Ext: Ace wrapping b/l, sensation intact    Chest pain   -EKG performed demonstrated A. Fib with RVR @ 105 bpm  -Aspirin STAT  -BMP, Troponin, INR STAT  -Pain control  -Continue with Coumadin and heparin   -Continue with Cardizem, Metoprolol and Lipitor    Pt reassessed, after receiving pain meds, morning dose of Cardizem and Metoprolol, chest pain resolved. Leg pain is improving with pain meds.  Will f/u labs

## 2019-04-19 ENCOUNTER — APPOINTMENT (OUTPATIENT)
Dept: VASCULAR SURGERY | Facility: CLINIC | Age: 69
End: 2019-04-19

## 2019-04-19 ENCOUNTER — TRANSCRIPTION ENCOUNTER (OUTPATIENT)
Age: 69
End: 2019-04-19

## 2019-04-19 LAB
ANION GAP SERPL CALC-SCNC: 9 MMOL/L — SIGNIFICANT CHANGE UP (ref 5–17)
BUN SERPL-MCNC: 8 MG/DL — SIGNIFICANT CHANGE UP (ref 7–23)
CALCIUM SERPL-MCNC: 8.8 MG/DL — SIGNIFICANT CHANGE UP (ref 8.5–10.1)
CHLORIDE SERPL-SCNC: 101 MMOL/L — SIGNIFICANT CHANGE UP (ref 96–108)
CO2 SERPL-SCNC: 27 MMOL/L — SIGNIFICANT CHANGE UP (ref 22–31)
CREAT SERPL-MCNC: 0.43 MG/DL — LOW (ref 0.5–1.3)
GLUCOSE BLDC GLUCOMTR-MCNC: 143 MG/DL — HIGH (ref 70–99)
GLUCOSE BLDC GLUCOMTR-MCNC: 167 MG/DL — HIGH (ref 70–99)
GLUCOSE BLDC GLUCOMTR-MCNC: 212 MG/DL — HIGH (ref 70–99)
GLUCOSE BLDC GLUCOMTR-MCNC: 362 MG/DL — HIGH (ref 70–99)
GLUCOSE SERPL-MCNC: 179 MG/DL — HIGH (ref 70–99)
HCT VFR BLD CALC: 37.9 % — SIGNIFICANT CHANGE UP (ref 34.5–45)
HGB BLD-MCNC: 12.4 G/DL — SIGNIFICANT CHANGE UP (ref 11.5–15.5)
INR BLD: 2.14 RATIO — HIGH (ref 0.88–1.16)
MCHC RBC-ENTMCNC: 30.6 PG — SIGNIFICANT CHANGE UP (ref 27–34)
MCHC RBC-ENTMCNC: 32.7 GM/DL — SIGNIFICANT CHANGE UP (ref 32–36)
MCV RBC AUTO: 93.6 FL — SIGNIFICANT CHANGE UP (ref 80–100)
NRBC # BLD: 0 /100 WBCS — SIGNIFICANT CHANGE UP (ref 0–0)
PLATELET # BLD AUTO: 271 K/UL — SIGNIFICANT CHANGE UP (ref 150–400)
POTASSIUM SERPL-MCNC: 4 MMOL/L — SIGNIFICANT CHANGE UP (ref 3.5–5.3)
POTASSIUM SERPL-SCNC: 4 MMOL/L — SIGNIFICANT CHANGE UP (ref 3.5–5.3)
PROTHROM AB SERPL-ACNC: 24.3 SEC — HIGH (ref 10–12.9)
RBC # BLD: 4.05 M/UL — SIGNIFICANT CHANGE UP (ref 3.8–5.2)
RBC # FLD: 14.9 % — HIGH (ref 10.3–14.5)
SODIUM SERPL-SCNC: 137 MMOL/L — SIGNIFICANT CHANGE UP (ref 135–145)
VANCOMYCIN TROUGH SERPL-MCNC: 2.7 UG/ML — LOW (ref 10–20)
WBC # BLD: 12.31 K/UL — HIGH (ref 3.8–10.5)
WBC # FLD AUTO: 12.31 K/UL — HIGH (ref 3.8–10.5)

## 2019-04-19 RX ORDER — DIPHENHYDRAMINE HCL 50 MG
25 CAPSULE ORAL EVERY 6 HOURS
Qty: 0 | Refills: 0 | Status: DISCONTINUED | OUTPATIENT
Start: 2019-04-19 | End: 2019-04-28

## 2019-04-19 RX ORDER — VANCOMYCIN HCL 1 G
750 VIAL (EA) INTRAVENOUS EVERY 8 HOURS
Qty: 0 | Refills: 0 | Status: DISCONTINUED | OUTPATIENT
Start: 2019-04-19 | End: 2019-04-21

## 2019-04-19 RX ORDER — WARFARIN SODIUM 2.5 MG/1
5 TABLET ORAL ONCE
Qty: 0 | Refills: 0 | Status: COMPLETED | OUTPATIENT
Start: 2019-04-19 | End: 2019-04-19

## 2019-04-19 RX ORDER — INSULIN LISPRO 100/ML
2 VIAL (ML) SUBCUTANEOUS ONCE
Qty: 0 | Refills: 0 | Status: COMPLETED | OUTPATIENT
Start: 2019-04-19 | End: 2019-04-19

## 2019-04-19 RX ADMIN — HEPARIN SODIUM 5000 UNIT(S): 5000 INJECTION INTRAVENOUS; SUBCUTANEOUS at 05:15

## 2019-04-19 RX ADMIN — Medication 2 UNIT(S): at 21:53

## 2019-04-19 RX ADMIN — WARFARIN SODIUM 5 MILLIGRAM(S): 2.5 TABLET ORAL at 21:19

## 2019-04-19 RX ADMIN — Medication 250 MILLIGRAM(S): at 21:23

## 2019-04-19 RX ADMIN — Medication 81 MILLIGRAM(S): at 11:14

## 2019-04-19 RX ADMIN — Medication 120 MILLIGRAM(S): at 05:17

## 2019-04-19 RX ADMIN — GABAPENTIN 300 MILLIGRAM(S): 400 CAPSULE ORAL at 11:14

## 2019-04-19 RX ADMIN — HYDROMORPHONE HYDROCHLORIDE 2 MILLIGRAM(S): 2 INJECTION INTRAMUSCULAR; INTRAVENOUS; SUBCUTANEOUS at 14:58

## 2019-04-19 RX ADMIN — NYSTATIN CREAM 1 APPLICATION(S): 100000 CREAM TOPICAL at 21:20

## 2019-04-19 RX ADMIN — Medication 500 MILLIGRAM(S): at 11:14

## 2019-04-19 RX ADMIN — CEFEPIME 100 MILLIGRAM(S): 1 INJECTION, POWDER, FOR SOLUTION INTRAMUSCULAR; INTRAVENOUS at 17:48

## 2019-04-19 RX ADMIN — Medication 25 MILLIGRAM(S): at 17:48

## 2019-04-19 RX ADMIN — HYDROMORPHONE HYDROCHLORIDE 2 MILLIGRAM(S): 2 INJECTION INTRAMUSCULAR; INTRAVENOUS; SUBCUTANEOUS at 09:45

## 2019-04-19 RX ADMIN — HYDROMORPHONE HYDROCHLORIDE 2 MILLIGRAM(S): 2 INJECTION INTRAMUSCULAR; INTRAVENOUS; SUBCUTANEOUS at 10:30

## 2019-04-19 RX ADMIN — ATORVASTATIN CALCIUM 10 MILLIGRAM(S): 80 TABLET, FILM COATED ORAL at 21:19

## 2019-04-19 RX ADMIN — NYSTATIN CREAM 1 APPLICATION(S): 100000 CREAM TOPICAL at 14:59

## 2019-04-19 RX ADMIN — HYDROMORPHONE HYDROCHLORIDE 2 MILLIGRAM(S): 2 INJECTION INTRAMUSCULAR; INTRAVENOUS; SUBCUTANEOUS at 21:19

## 2019-04-19 RX ADMIN — Medication 100 MILLIGRAM(S): at 05:17

## 2019-04-19 RX ADMIN — HYDROMORPHONE HYDROCHLORIDE 2 MILLIGRAM(S): 2 INJECTION INTRAMUSCULAR; INTRAVENOUS; SUBCUTANEOUS at 02:20

## 2019-04-19 RX ADMIN — HYDROMORPHONE HYDROCHLORIDE 2 MILLIGRAM(S): 2 INJECTION INTRAMUSCULAR; INTRAVENOUS; SUBCUTANEOUS at 01:42

## 2019-04-19 RX ADMIN — Medication 250 MILLIGRAM(S): at 14:58

## 2019-04-19 RX ADMIN — Medication 1: at 17:52

## 2019-04-19 RX ADMIN — Medication 2: at 07:35

## 2019-04-19 RX ADMIN — CEFEPIME 100 MILLIGRAM(S): 1 INJECTION, POWDER, FOR SOLUTION INTRAMUSCULAR; INTRAVENOUS at 05:15

## 2019-04-19 RX ADMIN — HYDROMORPHONE HYDROCHLORIDE 2 MILLIGRAM(S): 2 INJECTION INTRAMUSCULAR; INTRAVENOUS; SUBCUTANEOUS at 05:43

## 2019-04-19 RX ADMIN — Medication 40 MILLIGRAM(S): at 05:17

## 2019-04-19 RX ADMIN — Medication 120 MILLIGRAM(S): at 17:49

## 2019-04-19 RX ADMIN — Medication 250 MILLIGRAM(S): at 06:32

## 2019-04-19 RX ADMIN — MONTELUKAST 10 MILLIGRAM(S): 4 TABLET, CHEWABLE ORAL at 21:19

## 2019-04-19 RX ADMIN — Medication 25 MILLIGRAM(S): at 11:13

## 2019-04-19 RX ADMIN — NYSTATIN CREAM 1 APPLICATION(S): 100000 CREAM TOPICAL at 05:17

## 2019-04-19 NOTE — DISCHARGE NOTE NURSING/CASE MANAGEMENT/SOCIAL WORK - NSDCDPATPORTLINK_GEN_ALL_CORE
You can access the [facility], offered by [facility], by registering with the following website: [email or web address]

## 2019-04-19 NOTE — PROGRESS NOTE ADULT - ATTENDING COMMENTS
68 year old female with history of severe chronic venous insufficiency with multiple previous admissions for bilateral leg ulcers and cellulitis now presenting with left leg cellulitis. There is no evidence of abscess or necrotizing STI  -Recommend aggressive compression therapy. Will start with daily ace compression and transition to Unna boot when cellulitis is better controlled.  -Continue intravenous antibiotics  -Left leg elevation while in bed.  -Consider restarting lasix, patient has underlying CHF that may aggravate leg swelling and celllulitis.  -Will follow Local wound debridement of necrotic skin performed today  Continue IV antibiotics and LE compression  Will follow

## 2019-04-20 LAB
ANION GAP SERPL CALC-SCNC: 8 MMOL/L — SIGNIFICANT CHANGE UP (ref 5–17)
BUN SERPL-MCNC: 9 MG/DL — SIGNIFICANT CHANGE UP (ref 7–23)
CALCIUM SERPL-MCNC: 8.4 MG/DL — LOW (ref 8.5–10.1)
CHLORIDE SERPL-SCNC: 102 MMOL/L — SIGNIFICANT CHANGE UP (ref 96–108)
CO2 SERPL-SCNC: 29 MMOL/L — SIGNIFICANT CHANGE UP (ref 22–31)
CREAT SERPL-MCNC: 0.41 MG/DL — LOW (ref 0.5–1.3)
GLUCOSE BLDC GLUCOMTR-MCNC: 177 MG/DL — HIGH (ref 70–99)
GLUCOSE BLDC GLUCOMTR-MCNC: 189 MG/DL — HIGH (ref 70–99)
GLUCOSE BLDC GLUCOMTR-MCNC: 221 MG/DL — HIGH (ref 70–99)
GLUCOSE BLDC GLUCOMTR-MCNC: 240 MG/DL — HIGH (ref 70–99)
GLUCOSE SERPL-MCNC: 195 MG/DL — HIGH (ref 70–99)
HCT VFR BLD CALC: 36.8 % — SIGNIFICANT CHANGE UP (ref 34.5–45)
HGB BLD-MCNC: 11.9 G/DL — SIGNIFICANT CHANGE UP (ref 11.5–15.5)
INR BLD: 2.71 RATIO — HIGH (ref 0.88–1.16)
MCHC RBC-ENTMCNC: 30.4 PG — SIGNIFICANT CHANGE UP (ref 27–34)
MCHC RBC-ENTMCNC: 32.3 GM/DL — SIGNIFICANT CHANGE UP (ref 32–36)
MCV RBC AUTO: 93.9 FL — SIGNIFICANT CHANGE UP (ref 80–100)
NRBC # BLD: 0 /100 WBCS — SIGNIFICANT CHANGE UP (ref 0–0)
PLATELET # BLD AUTO: 340 K/UL — SIGNIFICANT CHANGE UP (ref 150–400)
POTASSIUM SERPL-MCNC: 3.4 MMOL/L — LOW (ref 3.5–5.3)
POTASSIUM SERPL-SCNC: 3.4 MMOL/L — LOW (ref 3.5–5.3)
PROTHROM AB SERPL-ACNC: 31 SEC — HIGH (ref 10–12.9)
RBC # BLD: 3.92 M/UL — SIGNIFICANT CHANGE UP (ref 3.8–5.2)
RBC # FLD: 14.8 % — HIGH (ref 10.3–14.5)
SODIUM SERPL-SCNC: 139 MMOL/L — SIGNIFICANT CHANGE UP (ref 135–145)
WBC # BLD: 9.16 K/UL — SIGNIFICANT CHANGE UP (ref 3.8–10.5)
WBC # FLD AUTO: 9.16 K/UL — SIGNIFICANT CHANGE UP (ref 3.8–10.5)

## 2019-04-20 RX ORDER — WARFARIN SODIUM 2.5 MG/1
5 TABLET ORAL AT BEDTIME
Qty: 0 | Refills: 0 | Status: DISCONTINUED | OUTPATIENT
Start: 2019-04-20 | End: 2019-05-06

## 2019-04-20 RX ORDER — POTASSIUM CHLORIDE 20 MEQ
40 PACKET (EA) ORAL EVERY 4 HOURS
Qty: 0 | Refills: 0 | Status: COMPLETED | OUTPATIENT
Start: 2019-04-20 | End: 2019-04-20

## 2019-04-20 RX ADMIN — Medication 25 MILLIGRAM(S): at 15:49

## 2019-04-20 RX ADMIN — HYDROMORPHONE HYDROCHLORIDE 2 MILLIGRAM(S): 2 INJECTION INTRAMUSCULAR; INTRAVENOUS; SUBCUTANEOUS at 14:08

## 2019-04-20 RX ADMIN — Medication 120 MILLIGRAM(S): at 05:09

## 2019-04-20 RX ADMIN — GABAPENTIN 300 MILLIGRAM(S): 400 CAPSULE ORAL at 12:44

## 2019-04-20 RX ADMIN — Medication 2: at 12:42

## 2019-04-20 RX ADMIN — Medication 81 MILLIGRAM(S): at 12:41

## 2019-04-20 RX ADMIN — HYDROMORPHONE HYDROCHLORIDE 2 MILLIGRAM(S): 2 INJECTION INTRAMUSCULAR; INTRAVENOUS; SUBCUTANEOUS at 02:20

## 2019-04-20 RX ADMIN — Medication 25 MILLIGRAM(S): at 20:33

## 2019-04-20 RX ADMIN — ATORVASTATIN CALCIUM 10 MILLIGRAM(S): 80 TABLET, FILM COATED ORAL at 20:39

## 2019-04-20 RX ADMIN — Medication 250 MILLIGRAM(S): at 14:10

## 2019-04-20 RX ADMIN — Medication 40 MILLIEQUIVALENT(S): at 12:41

## 2019-04-20 RX ADMIN — NYSTATIN CREAM 1 APPLICATION(S): 100000 CREAM TOPICAL at 05:16

## 2019-04-20 RX ADMIN — Medication 100 MILLIGRAM(S): at 05:10

## 2019-04-20 RX ADMIN — NYSTATIN CREAM 1 APPLICATION(S): 100000 CREAM TOPICAL at 20:34

## 2019-04-20 RX ADMIN — WARFARIN SODIUM 5 MILLIGRAM(S): 2.5 TABLET ORAL at 20:32

## 2019-04-20 RX ADMIN — Medication 1: at 08:20

## 2019-04-20 RX ADMIN — Medication 500 MILLIGRAM(S): at 12:41

## 2019-04-20 RX ADMIN — NYSTATIN CREAM 1 APPLICATION(S): 100000 CREAM TOPICAL at 14:14

## 2019-04-20 RX ADMIN — Medication 40 MILLIEQUIVALENT(S): at 15:50

## 2019-04-20 RX ADMIN — Medication 25 MILLIGRAM(S): at 02:22

## 2019-04-20 RX ADMIN — CEFEPIME 100 MILLIGRAM(S): 1 INJECTION, POWDER, FOR SOLUTION INTRAMUSCULAR; INTRAVENOUS at 05:15

## 2019-04-20 RX ADMIN — Medication 250 MILLIGRAM(S): at 20:39

## 2019-04-20 RX ADMIN — HYDROMORPHONE HYDROCHLORIDE 2 MILLIGRAM(S): 2 INJECTION INTRAMUSCULAR; INTRAVENOUS; SUBCUTANEOUS at 20:33

## 2019-04-20 RX ADMIN — MONTELUKAST 10 MILLIGRAM(S): 4 TABLET, CHEWABLE ORAL at 20:33

## 2019-04-20 RX ADMIN — Medication 40 MILLIGRAM(S): at 05:10

## 2019-04-20 RX ADMIN — Medication 250 MILLIGRAM(S): at 06:30

## 2019-04-20 RX ADMIN — HYDROMORPHONE HYDROCHLORIDE 2 MILLIGRAM(S): 2 INJECTION INTRAMUSCULAR; INTRAVENOUS; SUBCUTANEOUS at 08:34

## 2019-04-20 RX ADMIN — CEFEPIME 100 MILLIGRAM(S): 1 INJECTION, POWDER, FOR SOLUTION INTRAMUSCULAR; INTRAVENOUS at 17:27

## 2019-04-20 RX ADMIN — Medication 120 MILLIGRAM(S): at 17:31

## 2019-04-20 RX ADMIN — Medication 25 MILLIGRAM(S): at 08:36

## 2019-04-20 RX ADMIN — Medication 1: at 17:29

## 2019-04-21 LAB
ANION GAP SERPL CALC-SCNC: 7 MMOL/L — SIGNIFICANT CHANGE UP (ref 5–17)
BUN SERPL-MCNC: 17 MG/DL — SIGNIFICANT CHANGE UP (ref 7–23)
CALCIUM SERPL-MCNC: 8.3 MG/DL — LOW (ref 8.5–10.1)
CHLORIDE SERPL-SCNC: 104 MMOL/L — SIGNIFICANT CHANGE UP (ref 96–108)
CO2 SERPL-SCNC: 28 MMOL/L — SIGNIFICANT CHANGE UP (ref 22–31)
CREAT SERPL-MCNC: 0.56 MG/DL — SIGNIFICANT CHANGE UP (ref 0.5–1.3)
GLUCOSE BLDC GLUCOMTR-MCNC: 185 MG/DL — HIGH (ref 70–99)
GLUCOSE BLDC GLUCOMTR-MCNC: 185 MG/DL — HIGH (ref 70–99)
GLUCOSE BLDC GLUCOMTR-MCNC: 238 MG/DL — HIGH (ref 70–99)
GLUCOSE BLDC GLUCOMTR-MCNC: 278 MG/DL — HIGH (ref 70–99)
GLUCOSE SERPL-MCNC: 225 MG/DL — HIGH (ref 70–99)
INR BLD: 2.74 RATIO — HIGH (ref 0.88–1.16)
POTASSIUM SERPL-MCNC: 4 MMOL/L — SIGNIFICANT CHANGE UP (ref 3.5–5.3)
POTASSIUM SERPL-SCNC: 4 MMOL/L — SIGNIFICANT CHANGE UP (ref 3.5–5.3)
PROTHROM AB SERPL-ACNC: 31.4 SEC — HIGH (ref 10–12.9)
SODIUM SERPL-SCNC: 139 MMOL/L — SIGNIFICANT CHANGE UP (ref 135–145)
VANCOMYCIN TROUGH SERPL-MCNC: 6.7 UG/ML — LOW (ref 10–20)

## 2019-04-21 RX ORDER — VANCOMYCIN HCL 1 G
1000 VIAL (EA) INTRAVENOUS EVERY 8 HOURS
Qty: 0 | Refills: 0 | Status: DISCONTINUED | OUTPATIENT
Start: 2019-04-21 | End: 2019-04-29

## 2019-04-21 RX ADMIN — ATORVASTATIN CALCIUM 10 MILLIGRAM(S): 80 TABLET, FILM COATED ORAL at 21:16

## 2019-04-21 RX ADMIN — HYDROMORPHONE HYDROCHLORIDE 2 MILLIGRAM(S): 2 INJECTION INTRAMUSCULAR; INTRAVENOUS; SUBCUTANEOUS at 21:16

## 2019-04-21 RX ADMIN — Medication 2: at 12:44

## 2019-04-21 RX ADMIN — Medication 120 MILLIGRAM(S): at 17:34

## 2019-04-21 RX ADMIN — Medication 250 MILLIGRAM(S): at 13:14

## 2019-04-21 RX ADMIN — Medication 25 MILLIGRAM(S): at 14:32

## 2019-04-21 RX ADMIN — Medication 100 MILLIGRAM(S): at 06:21

## 2019-04-21 RX ADMIN — Medication 1: at 17:32

## 2019-04-21 RX ADMIN — Medication 250 MILLIGRAM(S): at 21:16

## 2019-04-21 RX ADMIN — GABAPENTIN 300 MILLIGRAM(S): 400 CAPSULE ORAL at 12:45

## 2019-04-21 RX ADMIN — HYDROMORPHONE HYDROCHLORIDE 2 MILLIGRAM(S): 2 INJECTION INTRAMUSCULAR; INTRAVENOUS; SUBCUTANEOUS at 14:29

## 2019-04-21 RX ADMIN — Medication 500 MILLIGRAM(S): at 12:45

## 2019-04-21 RX ADMIN — Medication 40 MILLIGRAM(S): at 06:21

## 2019-04-21 RX ADMIN — Medication 1: at 08:24

## 2019-04-21 RX ADMIN — HYDROMORPHONE HYDROCHLORIDE 2 MILLIGRAM(S): 2 INJECTION INTRAMUSCULAR; INTRAVENOUS; SUBCUTANEOUS at 09:05

## 2019-04-21 RX ADMIN — Medication 120 MILLIGRAM(S): at 06:21

## 2019-04-21 RX ADMIN — HYDROMORPHONE HYDROCHLORIDE 2 MILLIGRAM(S): 2 INJECTION INTRAMUSCULAR; INTRAVENOUS; SUBCUTANEOUS at 01:49

## 2019-04-21 RX ADMIN — WARFARIN SODIUM 5 MILLIGRAM(S): 2.5 TABLET ORAL at 21:16

## 2019-04-21 RX ADMIN — NYSTATIN CREAM 1 APPLICATION(S): 100000 CREAM TOPICAL at 13:22

## 2019-04-21 RX ADMIN — Medication 25 MILLIGRAM(S): at 23:24

## 2019-04-21 RX ADMIN — CEFEPIME 100 MILLIGRAM(S): 1 INJECTION, POWDER, FOR SOLUTION INTRAMUSCULAR; INTRAVENOUS at 17:30

## 2019-04-21 RX ADMIN — NYSTATIN CREAM 1 APPLICATION(S): 100000 CREAM TOPICAL at 06:22

## 2019-04-21 RX ADMIN — Medication 250 MILLIGRAM(S): at 06:45

## 2019-04-21 RX ADMIN — Medication 1 APPLICATION(S): at 12:47

## 2019-04-21 RX ADMIN — MONTELUKAST 10 MILLIGRAM(S): 4 TABLET, CHEWABLE ORAL at 21:16

## 2019-04-21 RX ADMIN — NYSTATIN CREAM 1 APPLICATION(S): 100000 CREAM TOPICAL at 21:27

## 2019-04-21 RX ADMIN — CEFEPIME 100 MILLIGRAM(S): 1 INJECTION, POWDER, FOR SOLUTION INTRAMUSCULAR; INTRAVENOUS at 06:18

## 2019-04-21 RX ADMIN — Medication 81 MILLIGRAM(S): at 12:45

## 2019-04-21 RX ADMIN — Medication 25 MILLIGRAM(S): at 08:14

## 2019-04-22 LAB
CULTURE RESULTS: SIGNIFICANT CHANGE UP
CULTURE RESULTS: SIGNIFICANT CHANGE UP
GLUCOSE BLDC GLUCOMTR-MCNC: 144 MG/DL — HIGH (ref 70–99)
GLUCOSE BLDC GLUCOMTR-MCNC: 170 MG/DL — HIGH (ref 70–99)
GLUCOSE BLDC GLUCOMTR-MCNC: 195 MG/DL — HIGH (ref 70–99)
GLUCOSE BLDC GLUCOMTR-MCNC: 247 MG/DL — HIGH (ref 70–99)
INR BLD: 2.56 RATIO — HIGH (ref 0.88–1.16)
PROTHROM AB SERPL-ACNC: 29.3 SEC — HIGH (ref 10–12.9)
SPECIMEN SOURCE: SIGNIFICANT CHANGE UP
SPECIMEN SOURCE: SIGNIFICANT CHANGE UP

## 2019-04-22 RX ADMIN — HYDROMORPHONE HYDROCHLORIDE 2 MILLIGRAM(S): 2 INJECTION INTRAMUSCULAR; INTRAVENOUS; SUBCUTANEOUS at 10:00

## 2019-04-22 RX ADMIN — HYDROMORPHONE HYDROCHLORIDE 2 MILLIGRAM(S): 2 INJECTION INTRAMUSCULAR; INTRAVENOUS; SUBCUTANEOUS at 18:34

## 2019-04-22 RX ADMIN — Medication 100 MILLIGRAM(S): at 05:46

## 2019-04-22 RX ADMIN — Medication 25 MILLIGRAM(S): at 18:34

## 2019-04-22 RX ADMIN — Medication 1: at 07:56

## 2019-04-22 RX ADMIN — CEFEPIME 100 MILLIGRAM(S): 1 INJECTION, POWDER, FOR SOLUTION INTRAMUSCULAR; INTRAVENOUS at 17:06

## 2019-04-22 RX ADMIN — Medication 120 MILLIGRAM(S): at 17:05

## 2019-04-22 RX ADMIN — Medication 81 MILLIGRAM(S): at 12:01

## 2019-04-22 RX ADMIN — HYDROMORPHONE HYDROCHLORIDE 2 MILLIGRAM(S): 2 INJECTION INTRAMUSCULAR; INTRAVENOUS; SUBCUTANEOUS at 01:22

## 2019-04-22 RX ADMIN — WARFARIN SODIUM 5 MILLIGRAM(S): 2.5 TABLET ORAL at 21:35

## 2019-04-22 RX ADMIN — Medication 500 MILLIGRAM(S): at 12:00

## 2019-04-22 RX ADMIN — Medication 250 MILLIGRAM(S): at 21:35

## 2019-04-22 RX ADMIN — Medication 250 MILLIGRAM(S): at 13:41

## 2019-04-22 RX ADMIN — Medication 1: at 12:04

## 2019-04-22 RX ADMIN — HYDROMORPHONE HYDROCHLORIDE 2 MILLIGRAM(S): 2 INJECTION INTRAMUSCULAR; INTRAVENOUS; SUBCUTANEOUS at 09:21

## 2019-04-22 RX ADMIN — NYSTATIN CREAM 1 APPLICATION(S): 100000 CREAM TOPICAL at 05:47

## 2019-04-22 RX ADMIN — Medication 250 MILLIGRAM(S): at 06:24

## 2019-04-22 RX ADMIN — Medication 40 MILLIGRAM(S): at 05:46

## 2019-04-22 RX ADMIN — CEFEPIME 100 MILLIGRAM(S): 1 INJECTION, POWDER, FOR SOLUTION INTRAMUSCULAR; INTRAVENOUS at 05:47

## 2019-04-22 RX ADMIN — GABAPENTIN 300 MILLIGRAM(S): 400 CAPSULE ORAL at 12:00

## 2019-04-22 RX ADMIN — NYSTATIN CREAM 1 APPLICATION(S): 100000 CREAM TOPICAL at 13:43

## 2019-04-22 RX ADMIN — Medication 25 MILLIGRAM(S): at 05:54

## 2019-04-22 RX ADMIN — Medication 1 APPLICATION(S): at 12:05

## 2019-04-22 RX ADMIN — Medication 120 MILLIGRAM(S): at 05:46

## 2019-04-22 RX ADMIN — HYDROMORPHONE HYDROCHLORIDE 2 MILLIGRAM(S): 2 INJECTION INTRAMUSCULAR; INTRAVENOUS; SUBCUTANEOUS at 23:07

## 2019-04-22 RX ADMIN — ATORVASTATIN CALCIUM 10 MILLIGRAM(S): 80 TABLET, FILM COATED ORAL at 21:35

## 2019-04-22 RX ADMIN — HYDROMORPHONE HYDROCHLORIDE 2 MILLIGRAM(S): 2 INJECTION INTRAMUSCULAR; INTRAVENOUS; SUBCUTANEOUS at 17:24

## 2019-04-22 RX ADMIN — MONTELUKAST 10 MILLIGRAM(S): 4 TABLET, CHEWABLE ORAL at 21:35

## 2019-04-22 RX ADMIN — NYSTATIN CREAM 1 APPLICATION(S): 100000 CREAM TOPICAL at 21:35

## 2019-04-22 NOTE — PHYSICAL THERAPY INITIAL EVALUATION ADULT - ACTIVE RANGE OF MOTION EXAMINATION, REHAB EVAL
Hip flex ~ 70 degrees, and DF neutral. Left LE unable to fully assess secondary to pain DF neutral./Left UE Active ROM was WFL (within functional limits)/Right LE Active ROM was WFL (within functional limits)/Right UE Active ROM was WFL (within functional limits)

## 2019-04-22 NOTE — PHYSICAL THERAPY INITIAL EVALUATION ADULT - MANUAL MUSCLE TESTING RESULTS, REHAB EVAL
Bilateral UE strength 4/5. Right LE strength 3+/5 throughout grossly. Left LE strength unable to fully assess secondary to pain DF 3/5.

## 2019-04-22 NOTE — PHYSICAL THERAPY INITIAL EVALUATION ADULT - GENERAL OBSERVATIONS, REHAB EVAL
Pt found supine in bed with bilateral lower legs ACE wrapped, IV, and bed alarm activated. Pt found supine in bed with bilateral lower legs ACE wrapped, IV, and bed alarm activated. Pt c/o pain left LE 7/10.

## 2019-04-22 NOTE — PHYSICAL THERAPY INITIAL EVALUATION ADULT - PERTINENT HX OF CURRENT PROBLEM, REHAB EVAL
Pt admitted to  secondary to LLE erythema/pain. INR-2.56. left foot x-ray: neg for fx, edema. Left tib/fib x-ray: edema. Left LE doppler: neg. CT left LE: extensive skin induration and subcutaneus edema left LE.

## 2019-04-22 NOTE — PHYSICAL THERAPY INITIAL EVALUATION ADULT - GAIT PATTERN USED, PT EVAL
Pt required verbal cues to increase posture. Pt with dragging of left LE behind her in ER secondary to pain left LE. Required verbal cues to attempt to improve step form left LE.

## 2019-04-23 LAB
ANION GAP SERPL CALC-SCNC: 7 MMOL/L — SIGNIFICANT CHANGE UP (ref 5–17)
BUN SERPL-MCNC: 16 MG/DL — SIGNIFICANT CHANGE UP (ref 7–23)
CALCIUM SERPL-MCNC: 8.4 MG/DL — LOW (ref 8.5–10.1)
CHLORIDE SERPL-SCNC: 101 MMOL/L — SIGNIFICANT CHANGE UP (ref 96–108)
CO2 SERPL-SCNC: 30 MMOL/L — SIGNIFICANT CHANGE UP (ref 22–31)
CREAT SERPL-MCNC: 0.57 MG/DL — SIGNIFICANT CHANGE UP (ref 0.5–1.3)
GLUCOSE BLDC GLUCOMTR-MCNC: 169 MG/DL — HIGH (ref 70–99)
GLUCOSE BLDC GLUCOMTR-MCNC: 200 MG/DL — HIGH (ref 70–99)
GLUCOSE BLDC GLUCOMTR-MCNC: 218 MG/DL — HIGH (ref 70–99)
GLUCOSE BLDC GLUCOMTR-MCNC: 290 MG/DL — HIGH (ref 70–99)
GLUCOSE SERPL-MCNC: 152 MG/DL — HIGH (ref 70–99)
INR BLD: 2.38 RATIO — HIGH (ref 0.88–1.16)
POTASSIUM SERPL-MCNC: 3.8 MMOL/L — SIGNIFICANT CHANGE UP (ref 3.5–5.3)
POTASSIUM SERPL-SCNC: 3.8 MMOL/L — SIGNIFICANT CHANGE UP (ref 3.5–5.3)
PROTHROM AB SERPL-ACNC: 27.1 SEC — HIGH (ref 10–12.9)
SODIUM SERPL-SCNC: 138 MMOL/L — SIGNIFICANT CHANGE UP (ref 135–145)

## 2019-04-23 RX ORDER — DOCUSATE SODIUM 100 MG
100 CAPSULE ORAL
Qty: 0 | Refills: 0 | Status: DISCONTINUED | OUTPATIENT
Start: 2019-04-23 | End: 2019-04-30

## 2019-04-23 RX ADMIN — Medication 100 MILLIGRAM(S): at 07:36

## 2019-04-23 RX ADMIN — Medication 250 MILLIGRAM(S): at 14:55

## 2019-04-23 RX ADMIN — Medication 100 MILLIGRAM(S): at 17:18

## 2019-04-23 RX ADMIN — ATORVASTATIN CALCIUM 10 MILLIGRAM(S): 80 TABLET, FILM COATED ORAL at 22:08

## 2019-04-23 RX ADMIN — HYDROMORPHONE HYDROCHLORIDE 2 MILLIGRAM(S): 2 INJECTION INTRAMUSCULAR; INTRAVENOUS; SUBCUTANEOUS at 22:08

## 2019-04-23 RX ADMIN — Medication 250 MILLIGRAM(S): at 22:09

## 2019-04-23 RX ADMIN — Medication 25 MILLIGRAM(S): at 07:37

## 2019-04-23 RX ADMIN — GABAPENTIN 300 MILLIGRAM(S): 400 CAPSULE ORAL at 11:15

## 2019-04-23 RX ADMIN — CEFEPIME 100 MILLIGRAM(S): 1 INJECTION, POWDER, FOR SOLUTION INTRAMUSCULAR; INTRAVENOUS at 05:08

## 2019-04-23 RX ADMIN — HYDROMORPHONE HYDROCHLORIDE 0.5 MILLIGRAM(S): 2 INJECTION INTRAMUSCULAR; INTRAVENOUS; SUBCUTANEOUS at 23:37

## 2019-04-23 RX ADMIN — Medication 1: at 07:39

## 2019-04-23 RX ADMIN — Medication 120 MILLIGRAM(S): at 05:08

## 2019-04-23 RX ADMIN — NYSTATIN CREAM 1 APPLICATION(S): 100000 CREAM TOPICAL at 05:11

## 2019-04-23 RX ADMIN — HYDROMORPHONE HYDROCHLORIDE 2 MILLIGRAM(S): 2 INJECTION INTRAMUSCULAR; INTRAVENOUS; SUBCUTANEOUS at 12:30

## 2019-04-23 RX ADMIN — Medication 100 MILLIGRAM(S): at 05:08

## 2019-04-23 RX ADMIN — HYDROMORPHONE HYDROCHLORIDE 2 MILLIGRAM(S): 2 INJECTION INTRAMUSCULAR; INTRAVENOUS; SUBCUTANEOUS at 11:14

## 2019-04-23 RX ADMIN — Medication 500 MILLIGRAM(S): at 11:15

## 2019-04-23 RX ADMIN — Medication 25 MILLIGRAM(S): at 17:21

## 2019-04-23 RX ADMIN — NYSTATIN CREAM 1 APPLICATION(S): 100000 CREAM TOPICAL at 14:55

## 2019-04-23 RX ADMIN — Medication 1 APPLICATION(S): at 11:24

## 2019-04-23 RX ADMIN — HYDROMORPHONE HYDROCHLORIDE 2 MILLIGRAM(S): 2 INJECTION INTRAMUSCULAR; INTRAVENOUS; SUBCUTANEOUS at 17:21

## 2019-04-23 RX ADMIN — Medication 3: at 11:23

## 2019-04-23 RX ADMIN — Medication 40 MILLIGRAM(S): at 05:08

## 2019-04-23 RX ADMIN — HYDROMORPHONE HYDROCHLORIDE 2 MILLIGRAM(S): 2 INJECTION INTRAMUSCULAR; INTRAVENOUS; SUBCUTANEOUS at 07:36

## 2019-04-23 RX ADMIN — CEFEPIME 100 MILLIGRAM(S): 1 INJECTION, POWDER, FOR SOLUTION INTRAMUSCULAR; INTRAVENOUS at 17:21

## 2019-04-23 RX ADMIN — WARFARIN SODIUM 5 MILLIGRAM(S): 2.5 TABLET ORAL at 22:09

## 2019-04-23 RX ADMIN — Medication 120 MILLIGRAM(S): at 17:18

## 2019-04-23 RX ADMIN — Medication 250 MILLIGRAM(S): at 05:08

## 2019-04-23 RX ADMIN — NYSTATIN CREAM 1 APPLICATION(S): 100000 CREAM TOPICAL at 22:09

## 2019-04-23 RX ADMIN — HYDROMORPHONE HYDROCHLORIDE 2 MILLIGRAM(S): 2 INJECTION INTRAMUSCULAR; INTRAVENOUS; SUBCUTANEOUS at 19:03

## 2019-04-23 RX ADMIN — Medication 1: at 17:25

## 2019-04-23 RX ADMIN — HYDROMORPHONE HYDROCHLORIDE 2 MILLIGRAM(S): 2 INJECTION INTRAMUSCULAR; INTRAVENOUS; SUBCUTANEOUS at 03:12

## 2019-04-23 RX ADMIN — MONTELUKAST 10 MILLIGRAM(S): 4 TABLET, CHEWABLE ORAL at 22:09

## 2019-04-23 RX ADMIN — Medication 81 MILLIGRAM(S): at 11:15

## 2019-04-23 RX ADMIN — HYDROMORPHONE HYDROCHLORIDE 2 MILLIGRAM(S): 2 INJECTION INTRAMUSCULAR; INTRAVENOUS; SUBCUTANEOUS at 08:00

## 2019-04-23 NOTE — DIETITIAN INITIAL EVALUATION ADULT. - OTHER INFO
Pt seen as LOS. Pt is a 69 yo F w PMH HTN, dyslipidemia, Diastolic CHF, a-fib (on coumadin), chronic venous insufficiency, DM2, thyroid nodule, ulcerative colitis, p/w LLE erythema/pain. Upon observation pt appears obsese w BMI 41.0. Pt reports during hospitalization appetite/intake has been fair given persistent pain r/t LE swelling. Pt w LLE cellulitis and s/p  debridement. Pt reports consuming 3 meals/day consuming ~75% of meals. Encouraged adequate protein intake at each meal to meet needs. Pt is currently on DASH diet and 1500 mL FR which is appropriate d/t PMH. Pt states she normally does not cook w/ salt however admits to adding salt to foods when eating. Reviewed information regarding heart failure nutriton therapy (daily wt, FR, low Na) pt somewhat receptive to information however also shows resistance to information. Pt w hx of DM, A1c 6.6% which is fairly well controlled. Pt reports medications were recently changed and she monitors BG daily at home. Pt denies following DM diet however states she tries to avoid excess sugars. Reviewed consistent CHO diet edu w pt. Written edu provied for both heart failure and DM education. Pt c/o constipation; last BM noted 4/22; currently on bowel regimen. Recommend monitoring. Pt noted w mild edema rt ankle/ rt foot and moderate edema left foot, ankle, and leg possibly contributing to wt gain. Jassi Jefferson. Recommendations: 1. add consistent CHO diet restrictions 2. reinforce diet edu PRN. 3. encourage adequate protein intake at each meal. 4. daily wt. 5. MVI w minerals.

## 2019-04-23 NOTE — DIETITIAN INITIAL EVALUATION ADULT. - PERTINENT LABORATORY DATA
04-23 Na138 mmol/L Glu 152 mg/dL<H> K+ 3.8 mmol/L Cr  0.57 mg/dL BUN 16 mg/dL Phos n/a   Alb n/a   PAB n/a

## 2019-04-23 NOTE — DIETITIAN INITIAL EVALUATION ADULT. - NS AS NUTRI DX KNOWLEDGE BELIEFS
Food - and nutrition - related knowledge deficit/Unsupported beliefs/attitudes about food or nutrition-relate/Limited adherence to nutrition - related recommendations/Not ready for diet/lifestyle change/Undesirable food choices

## 2019-04-23 NOTE — CHART NOTE - NSCHARTNOTEFT_GEN_A_CORE
Upon Nutritional Assessment by the Registered Dietitian your patient was determined to meet criteria / has evidence of the following diagnosis/diagnoses:          [ ]  Mild Protein Calorie Malnutrition        [ ]  Moderate Protein Calorie Malnutrition        [ ] Severe Protein Calorie Malnutrition        [ ] Unspecified Protein Calorie Malnutrition        [ ] Underweight / BMI <19        [x ] Morbid Obesity / BMI > 40      Findings:    Upon observation pt appears obese w BMI 41.0. Pt reports during hospitalization appetite/intake has been fair given persistent pain r/t LE swelling. Pt w LLE cellulitis and s/p  debridement. Pt reports consuming 3 meals/day consuming ~75% of meals. Encouraged adequate protein intake at each meal to meet needs. Pt is currently on DASH diet and 1500 mL FR which is appropriate d/t PMH. Pt states she normally does not cook w/ salt however admits to adding salt to foods when eating. Reviewed information regarding heart failure nutrition therapy (daily wt, FR, low Na) pt somewhat receptive to information however also shows resistance to information. Pt w hx of DM, A1c 6.6% which is fairly well controlled. Pt reports medications were recently changed and she monitors BG daily at home. Pt denies following DM diet however states she tries to avoid excess sugars. Reviewed consistent CHO diet edu w pt. Written edu provided for both heart failure and DM education.    Findings as based on:  •  Comprehensive nutrition assessment and consultation  •  Calorie counts (nutrient intake analysis)  •  Food acceptance and intake status from observations by staff  •  Follow up  •  Patient education  •  Intervention secondary to interdisciplinary rounds  •   concerns      Treatment:      1. add consistent CHO diet restrictions   2. reinforce diet edu PRN.   3. encourage adequate protein intake at each meal.   4. daily wt.   5. MVI w minerals.     The following diet has been recommended:    DASH/consistent CHO/1500mL FR    PROVIDER Section:     By signing this assessment you are acknowledging and agree with the diagnosis/diagnoses assigned by the Registered Dietitian    Comments:

## 2019-04-23 NOTE — DIETITIAN INITIAL EVALUATION ADULT. - PERTINENT MEDS FT
MEDICATIONS  (STANDING):  ascorbic acid 500 milliGRAM(s) Oral daily  aspirin  chewable 81 milliGRAM(s) Oral daily  atorvastatin 10 milliGRAM(s) Oral at bedtime  cefepime   IVPB 2000 milliGRAM(s) IV Intermittent every 12 hours  dextrose 5%. 1000 milliLiter(s) (50 mL/Hr) IV Continuous <Continuous>  dextrose 50% Injectable 12.5 Gram(s) IV Push once  dextrose 50% Injectable 25 Gram(s) IV Push once  dextrose 50% Injectable 25 Gram(s) IV Push once  diltiazem    Tablet 120 milliGRAM(s) Oral two times a day  furosemide    Tablet 40 milliGRAM(s) Oral daily  gabapentin 300 milliGRAM(s) Oral daily  insulin lispro (HumaLOG) corrective regimen sliding scale   SubCutaneous three times a day before meals  metoprolol succinate  milliGRAM(s) Oral daily  montelukast 10 milliGRAM(s) Oral at bedtime  nystatin Powder 1 Application(s) Topical three times a day  silver sulfADIAZINE 1% Cream 1 Application(s) Topical daily  uceris 9 milliGRAM(s) 9 mG Oral daily  vancomycin  IVPB 1000 milliGRAM(s) IV Intermittent every 8 hours  warfarin 5 milliGRAM(s) Oral at bedtime    MEDICATIONS  (PRN):  acetaminophen   Tablet .. 650 milliGRAM(s) Oral every 6 hours PRN Temp greater or equal to 38C (100.4F)  dextrose 40% Gel 15 Gram(s) Oral once PRN Blood Glucose LESS THAN 70 milliGRAM(s)/deciliter  diphenhydrAMINE 25 milliGRAM(s) Oral every 6 hours PRN Rash and/or Itching  docusate sodium 100 milliGRAM(s) Oral three times a day PRN Constipation  glucagon  Injectable 1 milliGRAM(s) IntraMuscular once PRN Glucose LESS THAN 70 milligrams/deciliter  HYDROmorphone  Injectable 2 milliGRAM(s) IV Push every 4 hours PRN Severe Pain (7 - 10)  HYDROmorphone  Injectable 0.5 milliGRAM(s) IV Push every 6 hours PRN Moderate Pain (4 - 6)  oxyCODONE    IR 5 milliGRAM(s) Oral every 4 hours PRN Mild Pain (1 - 3)  zolpidem 5 milliGRAM(s) Oral at bedtime PRN Insomnia

## 2019-04-24 LAB
GLUCOSE BLDC GLUCOMTR-MCNC: 187 MG/DL — HIGH (ref 70–99)
GLUCOSE BLDC GLUCOMTR-MCNC: 207 MG/DL — HIGH (ref 70–99)
GLUCOSE BLDC GLUCOMTR-MCNC: 210 MG/DL — HIGH (ref 70–99)
GLUCOSE BLDC GLUCOMTR-MCNC: 246 MG/DL — HIGH (ref 70–99)
INR BLD: 2.46 RATIO — HIGH (ref 0.88–1.16)
PROTHROM AB SERPL-ACNC: 28.1 SEC — HIGH (ref 10–12.9)

## 2019-04-24 RX ADMIN — Medication 250 MILLIGRAM(S): at 21:24

## 2019-04-24 RX ADMIN — NYSTATIN CREAM 1 APPLICATION(S): 100000 CREAM TOPICAL at 05:13

## 2019-04-24 RX ADMIN — Medication 100 MILLIGRAM(S): at 05:12

## 2019-04-24 RX ADMIN — MONTELUKAST 10 MILLIGRAM(S): 4 TABLET, CHEWABLE ORAL at 21:23

## 2019-04-24 RX ADMIN — Medication 250 MILLIGRAM(S): at 13:25

## 2019-04-24 RX ADMIN — HYDROMORPHONE HYDROCHLORIDE 2 MILLIGRAM(S): 2 INJECTION INTRAMUSCULAR; INTRAVENOUS; SUBCUTANEOUS at 18:44

## 2019-04-24 RX ADMIN — Medication 25 MILLIGRAM(S): at 21:23

## 2019-04-24 RX ADMIN — Medication 250 MILLIGRAM(S): at 05:13

## 2019-04-24 RX ADMIN — Medication 2: at 12:42

## 2019-04-24 RX ADMIN — Medication 650 MILLIGRAM(S): at 00:56

## 2019-04-24 RX ADMIN — Medication 500 MILLIGRAM(S): at 11:19

## 2019-04-24 RX ADMIN — Medication 25 MILLIGRAM(S): at 07:05

## 2019-04-24 RX ADMIN — NYSTATIN CREAM 1 APPLICATION(S): 100000 CREAM TOPICAL at 21:24

## 2019-04-24 RX ADMIN — HYDROMORPHONE HYDROCHLORIDE 2 MILLIGRAM(S): 2 INJECTION INTRAMUSCULAR; INTRAVENOUS; SUBCUTANEOUS at 02:12

## 2019-04-24 RX ADMIN — CEFEPIME 100 MILLIGRAM(S): 1 INJECTION, POWDER, FOR SOLUTION INTRAMUSCULAR; INTRAVENOUS at 07:01

## 2019-04-24 RX ADMIN — Medication 2: at 08:38

## 2019-04-24 RX ADMIN — Medication 100 MILLIGRAM(S): at 17:13

## 2019-04-24 RX ADMIN — Medication 120 MILLIGRAM(S): at 05:12

## 2019-04-24 RX ADMIN — NYSTATIN CREAM 1 APPLICATION(S): 100000 CREAM TOPICAL at 11:21

## 2019-04-24 RX ADMIN — WARFARIN SODIUM 5 MILLIGRAM(S): 2.5 TABLET ORAL at 21:23

## 2019-04-24 RX ADMIN — Medication 1 APPLICATION(S): at 11:17

## 2019-04-24 RX ADMIN — Medication 81 MILLIGRAM(S): at 11:19

## 2019-04-24 RX ADMIN — Medication 1: at 17:16

## 2019-04-24 RX ADMIN — HYDROMORPHONE HYDROCHLORIDE 2 MILLIGRAM(S): 2 INJECTION INTRAMUSCULAR; INTRAVENOUS; SUBCUTANEOUS at 06:04

## 2019-04-24 RX ADMIN — HYDROMORPHONE HYDROCHLORIDE 2 MILLIGRAM(S): 2 INJECTION INTRAMUSCULAR; INTRAVENOUS; SUBCUTANEOUS at 22:46

## 2019-04-24 RX ADMIN — GABAPENTIN 300 MILLIGRAM(S): 400 CAPSULE ORAL at 11:19

## 2019-04-24 RX ADMIN — Medication 40 MILLIGRAM(S): at 05:12

## 2019-04-24 RX ADMIN — Medication 120 MILLIGRAM(S): at 17:13

## 2019-04-24 RX ADMIN — ATORVASTATIN CALCIUM 10 MILLIGRAM(S): 80 TABLET, FILM COATED ORAL at 21:23

## 2019-04-24 RX ADMIN — CEFEPIME 100 MILLIGRAM(S): 1 INJECTION, POWDER, FOR SOLUTION INTRAMUSCULAR; INTRAVENOUS at 18:44

## 2019-04-24 RX ADMIN — HYDROMORPHONE HYDROCHLORIDE 2 MILLIGRAM(S): 2 INJECTION INTRAMUSCULAR; INTRAVENOUS; SUBCUTANEOUS at 10:03

## 2019-04-25 LAB
ANION GAP SERPL CALC-SCNC: 3 MMOL/L — LOW (ref 5–17)
BUN SERPL-MCNC: 13 MG/DL — SIGNIFICANT CHANGE UP (ref 7–23)
CALCIUM SERPL-MCNC: 8.6 MG/DL — SIGNIFICANT CHANGE UP (ref 8.5–10.1)
CHLORIDE SERPL-SCNC: 100 MMOL/L — SIGNIFICANT CHANGE UP (ref 96–108)
CO2 SERPL-SCNC: 32 MMOL/L — HIGH (ref 22–31)
CREAT SERPL-MCNC: 0.56 MG/DL — SIGNIFICANT CHANGE UP (ref 0.5–1.3)
GLUCOSE BLDC GLUCOMTR-MCNC: 147 MG/DL — HIGH (ref 70–99)
GLUCOSE BLDC GLUCOMTR-MCNC: 167 MG/DL — HIGH (ref 70–99)
GLUCOSE BLDC GLUCOMTR-MCNC: 169 MG/DL — HIGH (ref 70–99)
GLUCOSE BLDC GLUCOMTR-MCNC: 178 MG/DL — HIGH (ref 70–99)
GLUCOSE SERPL-MCNC: 148 MG/DL — HIGH (ref 70–99)
HCT VFR BLD CALC: 41.5 % — SIGNIFICANT CHANGE UP (ref 34.5–45)
HGB BLD-MCNC: 13.1 G/DL — SIGNIFICANT CHANGE UP (ref 11.5–15.5)
INR BLD: 2.69 RATIO — HIGH (ref 0.88–1.16)
MCHC RBC-ENTMCNC: 30.3 PG — SIGNIFICANT CHANGE UP (ref 27–34)
MCHC RBC-ENTMCNC: 31.6 GM/DL — LOW (ref 32–36)
MCV RBC AUTO: 96.1 FL — SIGNIFICANT CHANGE UP (ref 80–100)
NRBC # BLD: 0 /100 WBCS — SIGNIFICANT CHANGE UP (ref 0–0)
PLATELET # BLD AUTO: 450 K/UL — HIGH (ref 150–400)
POTASSIUM SERPL-MCNC: 3.7 MMOL/L — SIGNIFICANT CHANGE UP (ref 3.5–5.3)
POTASSIUM SERPL-SCNC: 3.7 MMOL/L — SIGNIFICANT CHANGE UP (ref 3.5–5.3)
PROTHROM AB SERPL-ACNC: 30.8 SEC — HIGH (ref 10–12.9)
RBC # BLD: 4.32 M/UL — SIGNIFICANT CHANGE UP (ref 3.8–5.2)
RBC # FLD: 14.8 % — HIGH (ref 10.3–14.5)
SODIUM SERPL-SCNC: 135 MMOL/L — SIGNIFICANT CHANGE UP (ref 135–145)
WBC # BLD: 11.88 K/UL — HIGH (ref 3.8–10.5)
WBC # FLD AUTO: 11.88 K/UL — HIGH (ref 3.8–10.5)

## 2019-04-25 RX ADMIN — Medication 1: at 11:31

## 2019-04-25 RX ADMIN — WARFARIN SODIUM 5 MILLIGRAM(S): 2.5 TABLET ORAL at 21:04

## 2019-04-25 RX ADMIN — NYSTATIN CREAM 1 APPLICATION(S): 100000 CREAM TOPICAL at 14:01

## 2019-04-25 RX ADMIN — Medication 100 MILLIGRAM(S): at 05:38

## 2019-04-25 RX ADMIN — HYDROMORPHONE HYDROCHLORIDE 2 MILLIGRAM(S): 2 INJECTION INTRAMUSCULAR; INTRAVENOUS; SUBCUTANEOUS at 20:56

## 2019-04-25 RX ADMIN — ATORVASTATIN CALCIUM 10 MILLIGRAM(S): 80 TABLET, FILM COATED ORAL at 21:04

## 2019-04-25 RX ADMIN — Medication 1 APPLICATION(S): at 13:57

## 2019-04-25 RX ADMIN — Medication 25 MILLIGRAM(S): at 10:28

## 2019-04-25 RX ADMIN — Medication 100 MILLIGRAM(S): at 17:52

## 2019-04-25 RX ADMIN — Medication 250 MILLIGRAM(S): at 21:04

## 2019-04-25 RX ADMIN — Medication 81 MILLIGRAM(S): at 10:21

## 2019-04-25 RX ADMIN — Medication 120 MILLIGRAM(S): at 17:51

## 2019-04-25 RX ADMIN — HYDROMORPHONE HYDROCHLORIDE 2 MILLIGRAM(S): 2 INJECTION INTRAMUSCULAR; INTRAVENOUS; SUBCUTANEOUS at 22:04

## 2019-04-25 RX ADMIN — Medication 250 MILLIGRAM(S): at 14:02

## 2019-04-25 RX ADMIN — Medication 250 MILLIGRAM(S): at 06:32

## 2019-04-25 RX ADMIN — HYDROMORPHONE HYDROCHLORIDE 2 MILLIGRAM(S): 2 INJECTION INTRAMUSCULAR; INTRAVENOUS; SUBCUTANEOUS at 02:38

## 2019-04-25 RX ADMIN — HYDROMORPHONE HYDROCHLORIDE 2 MILLIGRAM(S): 2 INJECTION INTRAMUSCULAR; INTRAVENOUS; SUBCUTANEOUS at 10:17

## 2019-04-25 RX ADMIN — HYDROMORPHONE HYDROCHLORIDE 2 MILLIGRAM(S): 2 INJECTION INTRAMUSCULAR; INTRAVENOUS; SUBCUTANEOUS at 06:32

## 2019-04-25 RX ADMIN — Medication 120 MILLIGRAM(S): at 05:37

## 2019-04-25 RX ADMIN — Medication 40 MILLIGRAM(S): at 05:38

## 2019-04-25 RX ADMIN — Medication 500 MILLIGRAM(S): at 10:21

## 2019-04-25 RX ADMIN — Medication 1: at 17:50

## 2019-04-25 RX ADMIN — CEFEPIME 100 MILLIGRAM(S): 1 INJECTION, POWDER, FOR SOLUTION INTRAMUSCULAR; INTRAVENOUS at 05:37

## 2019-04-25 RX ADMIN — Medication 25 MILLIGRAM(S): at 05:38

## 2019-04-25 RX ADMIN — HYDROMORPHONE HYDROCHLORIDE 2 MILLIGRAM(S): 2 INJECTION INTRAMUSCULAR; INTRAVENOUS; SUBCUTANEOUS at 15:25

## 2019-04-25 RX ADMIN — GABAPENTIN 300 MILLIGRAM(S): 400 CAPSULE ORAL at 10:21

## 2019-04-25 RX ADMIN — MONTELUKAST 10 MILLIGRAM(S): 4 TABLET, CHEWABLE ORAL at 21:04

## 2019-04-25 RX ADMIN — NYSTATIN CREAM 1 APPLICATION(S): 100000 CREAM TOPICAL at 21:06

## 2019-04-25 RX ADMIN — NYSTATIN CREAM 1 APPLICATION(S): 100000 CREAM TOPICAL at 05:39

## 2019-04-25 RX ADMIN — CEFEPIME 100 MILLIGRAM(S): 1 INJECTION, POWDER, FOR SOLUTION INTRAMUSCULAR; INTRAVENOUS at 17:50

## 2019-04-26 LAB
GLUCOSE BLDC GLUCOMTR-MCNC: 167 MG/DL — HIGH (ref 70–99)
GLUCOSE BLDC GLUCOMTR-MCNC: 170 MG/DL — HIGH (ref 70–99)
GLUCOSE BLDC GLUCOMTR-MCNC: 205 MG/DL — HIGH (ref 70–99)
GLUCOSE BLDC GLUCOMTR-MCNC: 455 MG/DL — CRITICAL HIGH (ref 70–99)
INR BLD: 2.57 RATIO — HIGH (ref 0.88–1.16)
PROTHROM AB SERPL-ACNC: 29.4 SEC — HIGH (ref 10–12.9)

## 2019-04-26 RX ORDER — OXYCODONE HYDROCHLORIDE 5 MG/1
10 TABLET ORAL EVERY 4 HOURS
Qty: 0 | Refills: 0 | Status: DISCONTINUED | OUTPATIENT
Start: 2019-04-26 | End: 2019-04-27

## 2019-04-26 RX ORDER — HYDROMORPHONE HYDROCHLORIDE 2 MG/ML
2 INJECTION INTRAMUSCULAR; INTRAVENOUS; SUBCUTANEOUS ONCE
Qty: 0 | Refills: 0 | Status: DISCONTINUED | OUTPATIENT
Start: 2019-04-26 | End: 2019-04-26

## 2019-04-26 RX ORDER — ONDANSETRON 8 MG/1
4 TABLET, FILM COATED ORAL ONCE
Qty: 0 | Refills: 0 | Status: COMPLETED | OUTPATIENT
Start: 2019-04-26 | End: 2019-04-26

## 2019-04-26 RX ORDER — HYDROMORPHONE HYDROCHLORIDE 2 MG/ML
2 INJECTION INTRAMUSCULAR; INTRAVENOUS; SUBCUTANEOUS EVERY 8 HOURS
Qty: 0 | Refills: 0 | Status: DISCONTINUED | OUTPATIENT
Start: 2019-04-26 | End: 2019-04-28

## 2019-04-26 RX ORDER — HYDROMORPHONE HYDROCHLORIDE 2 MG/ML
2 INJECTION INTRAMUSCULAR; INTRAVENOUS; SUBCUTANEOUS EVERY 4 HOURS
Qty: 0 | Refills: 0 | Status: DISCONTINUED | OUTPATIENT
Start: 2019-04-26 | End: 2019-04-26

## 2019-04-26 RX ADMIN — HYDROMORPHONE HYDROCHLORIDE 2 MILLIGRAM(S): 2 INJECTION INTRAMUSCULAR; INTRAVENOUS; SUBCUTANEOUS at 01:09

## 2019-04-26 RX ADMIN — Medication 650 MILLIGRAM(S): at 06:58

## 2019-04-26 RX ADMIN — NYSTATIN CREAM 1 APPLICATION(S): 100000 CREAM TOPICAL at 13:31

## 2019-04-26 RX ADMIN — WARFARIN SODIUM 5 MILLIGRAM(S): 2.5 TABLET ORAL at 21:04

## 2019-04-26 RX ADMIN — Medication 250 MILLIGRAM(S): at 05:38

## 2019-04-26 RX ADMIN — Medication 250 MILLIGRAM(S): at 21:05

## 2019-04-26 RX ADMIN — HYDROMORPHONE HYDROCHLORIDE 2 MILLIGRAM(S): 2 INJECTION INTRAMUSCULAR; INTRAVENOUS; SUBCUTANEOUS at 09:10

## 2019-04-26 RX ADMIN — Medication 120 MILLIGRAM(S): at 18:03

## 2019-04-26 RX ADMIN — Medication 650 MILLIGRAM(S): at 06:10

## 2019-04-26 RX ADMIN — Medication 100 MILLIGRAM(S): at 05:03

## 2019-04-26 RX ADMIN — Medication 6: at 12:43

## 2019-04-26 RX ADMIN — ATORVASTATIN CALCIUM 10 MILLIGRAM(S): 80 TABLET, FILM COATED ORAL at 21:04

## 2019-04-26 RX ADMIN — HYDROMORPHONE HYDROCHLORIDE 2 MILLIGRAM(S): 2 INJECTION INTRAMUSCULAR; INTRAVENOUS; SUBCUTANEOUS at 13:00

## 2019-04-26 RX ADMIN — Medication 120 MILLIGRAM(S): at 05:04

## 2019-04-26 RX ADMIN — Medication 500 MILLIGRAM(S): at 12:45

## 2019-04-26 RX ADMIN — NYSTATIN CREAM 1 APPLICATION(S): 100000 CREAM TOPICAL at 21:05

## 2019-04-26 RX ADMIN — Medication 81 MILLIGRAM(S): at 12:45

## 2019-04-26 RX ADMIN — GABAPENTIN 300 MILLIGRAM(S): 400 CAPSULE ORAL at 12:45

## 2019-04-26 RX ADMIN — CEFEPIME 100 MILLIGRAM(S): 1 INJECTION, POWDER, FOR SOLUTION INTRAMUSCULAR; INTRAVENOUS at 05:00

## 2019-04-26 RX ADMIN — ONDANSETRON 4 MILLIGRAM(S): 8 TABLET, FILM COATED ORAL at 08:38

## 2019-04-26 RX ADMIN — MONTELUKAST 10 MILLIGRAM(S): 4 TABLET, CHEWABLE ORAL at 21:04

## 2019-04-26 RX ADMIN — HYDROMORPHONE HYDROCHLORIDE 2 MILLIGRAM(S): 2 INJECTION INTRAMUSCULAR; INTRAVENOUS; SUBCUTANEOUS at 21:02

## 2019-04-26 RX ADMIN — Medication 25 MILLIGRAM(S): at 06:12

## 2019-04-26 RX ADMIN — Medication 250 MILLIGRAM(S): at 15:04

## 2019-04-26 RX ADMIN — HYDROMORPHONE HYDROCHLORIDE 2 MILLIGRAM(S): 2 INJECTION INTRAMUSCULAR; INTRAVENOUS; SUBCUTANEOUS at 02:00

## 2019-04-26 RX ADMIN — HYDROMORPHONE HYDROCHLORIDE 2 MILLIGRAM(S): 2 INJECTION INTRAMUSCULAR; INTRAVENOUS; SUBCUTANEOUS at 12:51

## 2019-04-26 RX ADMIN — Medication 1 APPLICATION(S): at 12:49

## 2019-04-26 RX ADMIN — Medication 40 MILLIGRAM(S): at 05:03

## 2019-04-26 RX ADMIN — HYDROMORPHONE HYDROCHLORIDE 2 MILLIGRAM(S): 2 INJECTION INTRAMUSCULAR; INTRAVENOUS; SUBCUTANEOUS at 08:40

## 2019-04-26 RX ADMIN — Medication 1: at 18:01

## 2019-04-26 RX ADMIN — Medication 1: at 08:11

## 2019-04-26 RX ADMIN — HYDROMORPHONE HYDROCHLORIDE 2 MILLIGRAM(S): 2 INJECTION INTRAMUSCULAR; INTRAVENOUS; SUBCUTANEOUS at 21:53

## 2019-04-26 RX ADMIN — CEFEPIME 100 MILLIGRAM(S): 1 INJECTION, POWDER, FOR SOLUTION INTRAMUSCULAR; INTRAVENOUS at 18:02

## 2019-04-26 RX ADMIN — NYSTATIN CREAM 1 APPLICATION(S): 100000 CREAM TOPICAL at 05:04

## 2019-04-26 RX ADMIN — Medication 100 MILLIGRAM(S): at 18:06

## 2019-04-26 RX ADMIN — OXYCODONE HYDROCHLORIDE 10 MILLIGRAM(S): 5 TABLET ORAL at 18:06

## 2019-04-26 NOTE — PROGRESS NOTE ADULT - ATTENDING COMMENTS
Unna boots to be applied when patient gives consent. She is refusing at this time, and wishes to see Dr. Yoo.

## 2019-04-27 LAB
GLUCOSE BLDC GLUCOMTR-MCNC: 142 MG/DL — HIGH (ref 70–99)
GLUCOSE BLDC GLUCOMTR-MCNC: 154 MG/DL — HIGH (ref 70–99)
GLUCOSE BLDC GLUCOMTR-MCNC: 179 MG/DL — HIGH (ref 70–99)

## 2019-04-27 RX ORDER — ALPRAZOLAM 0.25 MG
0.25 TABLET ORAL
Qty: 0 | Refills: 0 | Status: DISCONTINUED | OUTPATIENT
Start: 2019-04-27 | End: 2019-04-29

## 2019-04-27 RX ORDER — OXYCODONE HYDROCHLORIDE 5 MG/1
5 TABLET ORAL ONCE
Qty: 0 | Refills: 0 | Status: DISCONTINUED | OUTPATIENT
Start: 2019-04-27 | End: 2019-04-27

## 2019-04-27 RX ORDER — GABAPENTIN 400 MG/1
300 CAPSULE ORAL
Qty: 0 | Refills: 0 | Status: DISCONTINUED | OUTPATIENT
Start: 2019-04-27 | End: 2019-05-06

## 2019-04-27 RX ORDER — ONDANSETRON 8 MG/1
4 TABLET, FILM COATED ORAL EVERY 6 HOURS
Qty: 0 | Refills: 0 | Status: DISCONTINUED | OUTPATIENT
Start: 2019-04-27 | End: 2019-05-06

## 2019-04-27 RX ORDER — OXYCODONE HYDROCHLORIDE 5 MG/1
15 TABLET ORAL EVERY 4 HOURS
Qty: 0 | Refills: 0 | Status: DISCONTINUED | OUTPATIENT
Start: 2019-04-27 | End: 2019-04-28

## 2019-04-27 RX ADMIN — Medication 100 MILLIGRAM(S): at 05:12

## 2019-04-27 RX ADMIN — OXYCODONE HYDROCHLORIDE 15 MILLIGRAM(S): 5 TABLET ORAL at 16:20

## 2019-04-27 RX ADMIN — Medication 1: at 17:43

## 2019-04-27 RX ADMIN — Medication 250 MILLIGRAM(S): at 14:12

## 2019-04-27 RX ADMIN — MONTELUKAST 10 MILLIGRAM(S): 4 TABLET, CHEWABLE ORAL at 22:20

## 2019-04-27 RX ADMIN — Medication 500 MILLIGRAM(S): at 10:27

## 2019-04-27 RX ADMIN — Medication 0.25 MILLIGRAM(S): at 10:27

## 2019-04-27 RX ADMIN — GABAPENTIN 300 MILLIGRAM(S): 400 CAPSULE ORAL at 17:44

## 2019-04-27 RX ADMIN — Medication 40 MILLIGRAM(S): at 05:12

## 2019-04-27 RX ADMIN — CEFEPIME 100 MILLIGRAM(S): 1 INJECTION, POWDER, FOR SOLUTION INTRAMUSCULAR; INTRAVENOUS at 17:41

## 2019-04-27 RX ADMIN — HYDROMORPHONE HYDROCHLORIDE 2 MILLIGRAM(S): 2 INJECTION INTRAMUSCULAR; INTRAVENOUS; SUBCUTANEOUS at 14:30

## 2019-04-27 RX ADMIN — Medication 120 MILLIGRAM(S): at 17:44

## 2019-04-27 RX ADMIN — Medication 25 MILLIGRAM(S): at 16:15

## 2019-04-27 RX ADMIN — NYSTATIN CREAM 1 APPLICATION(S): 100000 CREAM TOPICAL at 14:07

## 2019-04-27 RX ADMIN — ONDANSETRON 4 MILLIGRAM(S): 8 TABLET, FILM COATED ORAL at 10:26

## 2019-04-27 RX ADMIN — NYSTATIN CREAM 1 APPLICATION(S): 100000 CREAM TOPICAL at 22:22

## 2019-04-27 RX ADMIN — CEFEPIME 100 MILLIGRAM(S): 1 INJECTION, POWDER, FOR SOLUTION INTRAMUSCULAR; INTRAVENOUS at 05:05

## 2019-04-27 RX ADMIN — Medication 1 APPLICATION(S): at 12:05

## 2019-04-27 RX ADMIN — Medication 25 MILLIGRAM(S): at 02:34

## 2019-04-27 RX ADMIN — WARFARIN SODIUM 5 MILLIGRAM(S): 2.5 TABLET ORAL at 22:20

## 2019-04-27 RX ADMIN — Medication 250 MILLIGRAM(S): at 05:32

## 2019-04-27 RX ADMIN — Medication 120 MILLIGRAM(S): at 05:11

## 2019-04-27 RX ADMIN — Medication 100 MILLIGRAM(S): at 17:44

## 2019-04-27 RX ADMIN — HYDROMORPHONE HYDROCHLORIDE 2 MILLIGRAM(S): 2 INJECTION INTRAMUSCULAR; INTRAVENOUS; SUBCUTANEOUS at 22:17

## 2019-04-27 RX ADMIN — Medication 81 MILLIGRAM(S): at 10:27

## 2019-04-27 RX ADMIN — OXYCODONE HYDROCHLORIDE 10 MILLIGRAM(S): 5 TABLET ORAL at 02:28

## 2019-04-27 RX ADMIN — Medication 1: at 08:43

## 2019-04-27 RX ADMIN — HYDROMORPHONE HYDROCHLORIDE 2 MILLIGRAM(S): 2 INJECTION INTRAMUSCULAR; INTRAVENOUS; SUBCUTANEOUS at 13:52

## 2019-04-27 RX ADMIN — ATORVASTATIN CALCIUM 10 MILLIGRAM(S): 80 TABLET, FILM COATED ORAL at 22:20

## 2019-04-27 RX ADMIN — OXYCODONE HYDROCHLORIDE 10 MILLIGRAM(S): 5 TABLET ORAL at 08:40

## 2019-04-27 RX ADMIN — HYDROMORPHONE HYDROCHLORIDE 2 MILLIGRAM(S): 2 INJECTION INTRAMUSCULAR; INTRAVENOUS; SUBCUTANEOUS at 05:26

## 2019-04-27 RX ADMIN — HYDROMORPHONE HYDROCHLORIDE 2 MILLIGRAM(S): 2 INJECTION INTRAMUSCULAR; INTRAVENOUS; SUBCUTANEOUS at 22:32

## 2019-04-27 RX ADMIN — OXYCODONE HYDROCHLORIDE 5 MILLIGRAM(S): 5 TABLET ORAL at 10:26

## 2019-04-27 RX ADMIN — NYSTATIN CREAM 1 APPLICATION(S): 100000 CREAM TOPICAL at 05:12

## 2019-04-27 RX ADMIN — Medication 250 MILLIGRAM(S): at 22:21

## 2019-04-28 LAB
GLUCOSE BLDC GLUCOMTR-MCNC: 140 MG/DL — HIGH (ref 70–99)
GLUCOSE BLDC GLUCOMTR-MCNC: 152 MG/DL — HIGH (ref 70–99)
GLUCOSE BLDC GLUCOMTR-MCNC: 155 MG/DL — HIGH (ref 70–99)
GLUCOSE BLDC GLUCOMTR-MCNC: 164 MG/DL — HIGH (ref 70–99)
GLUCOSE BLDC GLUCOMTR-MCNC: 173 MG/DL — HIGH (ref 70–99)
INR BLD: 2.59 RATIO — HIGH (ref 0.88–1.16)
PROTHROM AB SERPL-ACNC: 29.6 SEC — HIGH (ref 10–12.9)

## 2019-04-28 RX ORDER — SENNA PLUS 8.6 MG/1
2 TABLET ORAL AT BEDTIME
Qty: 0 | Refills: 0 | Status: DISCONTINUED | OUTPATIENT
Start: 2019-04-28 | End: 2019-05-06

## 2019-04-28 RX ORDER — HYDROMORPHONE HYDROCHLORIDE 2 MG/ML
4 INJECTION INTRAMUSCULAR; INTRAVENOUS; SUBCUTANEOUS EVERY 4 HOURS
Qty: 0 | Refills: 0 | Status: DISCONTINUED | OUTPATIENT
Start: 2019-04-28 | End: 2019-04-29

## 2019-04-28 RX ORDER — HYDROMORPHONE HYDROCHLORIDE 2 MG/ML
2 INJECTION INTRAMUSCULAR; INTRAVENOUS; SUBCUTANEOUS ONCE
Qty: 0 | Refills: 0 | Status: DISCONTINUED | OUTPATIENT
Start: 2019-04-28 | End: 2019-04-28

## 2019-04-28 RX ADMIN — OXYCODONE HYDROCHLORIDE 15 MILLIGRAM(S): 5 TABLET ORAL at 11:11

## 2019-04-28 RX ADMIN — GABAPENTIN 300 MILLIGRAM(S): 400 CAPSULE ORAL at 04:48

## 2019-04-28 RX ADMIN — NYSTATIN CREAM 1 APPLICATION(S): 100000 CREAM TOPICAL at 13:22

## 2019-04-28 RX ADMIN — Medication 40 MILLIGRAM(S): at 04:48

## 2019-04-28 RX ADMIN — ONDANSETRON 4 MILLIGRAM(S): 8 TABLET, FILM COATED ORAL at 17:36

## 2019-04-28 RX ADMIN — Medication 250 MILLIGRAM(S): at 05:53

## 2019-04-28 RX ADMIN — HYDROMORPHONE HYDROCHLORIDE 2 MILLIGRAM(S): 2 INJECTION INTRAMUSCULAR; INTRAVENOUS; SUBCUTANEOUS at 06:48

## 2019-04-28 RX ADMIN — Medication 81 MILLIGRAM(S): at 11:14

## 2019-04-28 RX ADMIN — WARFARIN SODIUM 5 MILLIGRAM(S): 2.5 TABLET ORAL at 23:25

## 2019-04-28 RX ADMIN — OXYCODONE HYDROCHLORIDE 15 MILLIGRAM(S): 5 TABLET ORAL at 04:06

## 2019-04-28 RX ADMIN — Medication 100 MILLIGRAM(S): at 17:13

## 2019-04-28 RX ADMIN — Medication 100 MILLIGRAM(S): at 04:49

## 2019-04-28 RX ADMIN — CEFEPIME 100 MILLIGRAM(S): 1 INJECTION, POWDER, FOR SOLUTION INTRAMUSCULAR; INTRAVENOUS at 17:12

## 2019-04-28 RX ADMIN — ATORVASTATIN CALCIUM 10 MILLIGRAM(S): 80 TABLET, FILM COATED ORAL at 22:08

## 2019-04-28 RX ADMIN — Medication 120 MILLIGRAM(S): at 17:13

## 2019-04-28 RX ADMIN — HYDROMORPHONE HYDROCHLORIDE 4 MILLIGRAM(S): 2 INJECTION INTRAMUSCULAR; INTRAVENOUS; SUBCUTANEOUS at 17:13

## 2019-04-28 RX ADMIN — Medication 1 APPLICATION(S): at 13:17

## 2019-04-28 RX ADMIN — Medication 1: at 08:17

## 2019-04-28 RX ADMIN — Medication 1: at 11:20

## 2019-04-28 RX ADMIN — MONTELUKAST 10 MILLIGRAM(S): 4 TABLET, CHEWABLE ORAL at 22:09

## 2019-04-28 RX ADMIN — ONDANSETRON 4 MILLIGRAM(S): 8 TABLET, FILM COATED ORAL at 11:11

## 2019-04-28 RX ADMIN — SENNA PLUS 2 TABLET(S): 8.6 TABLET ORAL at 22:08

## 2019-04-28 RX ADMIN — CEFEPIME 100 MILLIGRAM(S): 1 INJECTION, POWDER, FOR SOLUTION INTRAMUSCULAR; INTRAVENOUS at 04:44

## 2019-04-28 RX ADMIN — OXYCODONE HYDROCHLORIDE 15 MILLIGRAM(S): 5 TABLET ORAL at 11:41

## 2019-04-28 RX ADMIN — Medication 120 MILLIGRAM(S): at 06:37

## 2019-04-28 RX ADMIN — HYDROMORPHONE HYDROCHLORIDE 2 MILLIGRAM(S): 2 INJECTION INTRAMUSCULAR; INTRAVENOUS; SUBCUTANEOUS at 22:09

## 2019-04-28 RX ADMIN — GABAPENTIN 300 MILLIGRAM(S): 400 CAPSULE ORAL at 17:13

## 2019-04-28 RX ADMIN — Medication 250 MILLIGRAM(S): at 22:12

## 2019-04-28 RX ADMIN — Medication 250 MILLIGRAM(S): at 13:21

## 2019-04-28 RX ADMIN — NYSTATIN CREAM 1 APPLICATION(S): 100000 CREAM TOPICAL at 04:50

## 2019-04-28 RX ADMIN — OXYCODONE HYDROCHLORIDE 15 MILLIGRAM(S): 5 TABLET ORAL at 03:36

## 2019-04-28 RX ADMIN — HYDROMORPHONE HYDROCHLORIDE 4 MILLIGRAM(S): 2 INJECTION INTRAMUSCULAR; INTRAVENOUS; SUBCUTANEOUS at 13:33

## 2019-04-28 RX ADMIN — NYSTATIN CREAM 1 APPLICATION(S): 100000 CREAM TOPICAL at 22:08

## 2019-04-28 RX ADMIN — HYDROMORPHONE HYDROCHLORIDE 2 MILLIGRAM(S): 2 INJECTION INTRAMUSCULAR; INTRAVENOUS; SUBCUTANEOUS at 06:35

## 2019-04-28 RX ADMIN — Medication 500 MILLIGRAM(S): at 11:14

## 2019-04-29 LAB
ANION GAP SERPL CALC-SCNC: 6 MMOL/L — SIGNIFICANT CHANGE UP (ref 5–17)
BUN SERPL-MCNC: 6 MG/DL — LOW (ref 7–23)
CALCIUM SERPL-MCNC: 8 MG/DL — LOW (ref 8.5–10.1)
CHLORIDE SERPL-SCNC: 95 MMOL/L — LOW (ref 96–108)
CO2 SERPL-SCNC: 32 MMOL/L — HIGH (ref 22–31)
CREAT SERPL-MCNC: 0.55 MG/DL — SIGNIFICANT CHANGE UP (ref 0.5–1.3)
GLUCOSE BLDC GLUCOMTR-MCNC: 165 MG/DL — HIGH (ref 70–99)
GLUCOSE BLDC GLUCOMTR-MCNC: 165 MG/DL — HIGH (ref 70–99)
GLUCOSE BLDC GLUCOMTR-MCNC: 172 MG/DL — HIGH (ref 70–99)
GLUCOSE BLDC GLUCOMTR-MCNC: 176 MG/DL — HIGH (ref 70–99)
GLUCOSE SERPL-MCNC: 147 MG/DL — HIGH (ref 70–99)
HCT VFR BLD CALC: 37.9 % — SIGNIFICANT CHANGE UP (ref 34.5–45)
HGB BLD-MCNC: 12 G/DL — SIGNIFICANT CHANGE UP (ref 11.5–15.5)
MCHC RBC-ENTMCNC: 30.2 PG — SIGNIFICANT CHANGE UP (ref 27–34)
MCHC RBC-ENTMCNC: 31.7 GM/DL — LOW (ref 32–36)
MCV RBC AUTO: 95.5 FL — SIGNIFICANT CHANGE UP (ref 80–100)
NRBC # BLD: 0 /100 WBCS — SIGNIFICANT CHANGE UP (ref 0–0)
PLATELET # BLD AUTO: 305 K/UL — SIGNIFICANT CHANGE UP (ref 150–400)
POTASSIUM SERPL-MCNC: 3.3 MMOL/L — LOW (ref 3.5–5.3)
POTASSIUM SERPL-SCNC: 3.3 MMOL/L — LOW (ref 3.5–5.3)
RBC # BLD: 3.97 M/UL — SIGNIFICANT CHANGE UP (ref 3.8–5.2)
RBC # FLD: 14.8 % — HIGH (ref 10.3–14.5)
SODIUM SERPL-SCNC: 133 MMOL/L — LOW (ref 135–145)
WBC # BLD: 10.64 K/UL — HIGH (ref 3.8–10.5)
WBC # FLD AUTO: 10.64 K/UL — HIGH (ref 3.8–10.5)

## 2019-04-29 PROCEDURE — 93010 ELECTROCARDIOGRAM REPORT: CPT

## 2019-04-29 RX ORDER — HYDROMORPHONE HYDROCHLORIDE 2 MG/ML
4 INJECTION INTRAMUSCULAR; INTRAVENOUS; SUBCUTANEOUS EVERY 6 HOURS
Qty: 0 | Refills: 0 | Status: COMPLETED | OUTPATIENT
Start: 2019-04-29 | End: 2019-05-06

## 2019-04-29 RX ORDER — LANOLIN ALCOHOL/MO/W.PET/CERES
5 CREAM (GRAM) TOPICAL AT BEDTIME
Qty: 0 | Refills: 0 | Status: DISCONTINUED | OUTPATIENT
Start: 2019-04-29 | End: 2019-05-06

## 2019-04-29 RX ORDER — ALPRAZOLAM 0.25 MG
0.25 TABLET ORAL THREE TIMES A DAY
Qty: 0 | Refills: 0 | Status: DISCONTINUED | OUTPATIENT
Start: 2019-04-29 | End: 2019-04-30

## 2019-04-29 RX ORDER — CEFTRIAXONE 500 MG/1
2000 INJECTION, POWDER, FOR SOLUTION INTRAMUSCULAR; INTRAVENOUS EVERY 24 HOURS
Qty: 0 | Refills: 0 | Status: DISCONTINUED | OUTPATIENT
Start: 2019-04-29 | End: 2019-05-01

## 2019-04-29 RX ORDER — POTASSIUM CHLORIDE 20 MEQ
20 PACKET (EA) ORAL ONCE
Qty: 0 | Refills: 0 | Status: COMPLETED | OUTPATIENT
Start: 2019-04-29 | End: 2019-04-29

## 2019-04-29 RX ORDER — VANCOMYCIN HCL 1 G
750 VIAL (EA) INTRAVENOUS EVERY 8 HOURS
Qty: 0 | Refills: 0 | Status: DISCONTINUED | OUTPATIENT
Start: 2019-04-29 | End: 2019-05-03

## 2019-04-29 RX ORDER — HYDROMORPHONE HYDROCHLORIDE 2 MG/ML
0.5 INJECTION INTRAMUSCULAR; INTRAVENOUS; SUBCUTANEOUS ONCE
Qty: 0 | Refills: 0 | Status: DISCONTINUED | OUTPATIENT
Start: 2019-04-29 | End: 2019-04-29

## 2019-04-29 RX ADMIN — HYDROMORPHONE HYDROCHLORIDE 4 MILLIGRAM(S): 2 INJECTION INTRAMUSCULAR; INTRAVENOUS; SUBCUTANEOUS at 10:30

## 2019-04-29 RX ADMIN — Medication 250 MILLIGRAM(S): at 21:21

## 2019-04-29 RX ADMIN — Medication 1 APPLICATION(S): at 12:03

## 2019-04-29 RX ADMIN — Medication 1: at 08:07

## 2019-04-29 RX ADMIN — CEFTRIAXONE 2000 MILLIGRAM(S): 500 INJECTION, POWDER, FOR SOLUTION INTRAMUSCULAR; INTRAVENOUS at 14:31

## 2019-04-29 RX ADMIN — NYSTATIN CREAM 1 APPLICATION(S): 100000 CREAM TOPICAL at 04:49

## 2019-04-29 RX ADMIN — NYSTATIN CREAM 1 APPLICATION(S): 100000 CREAM TOPICAL at 21:22

## 2019-04-29 RX ADMIN — Medication 1: at 11:56

## 2019-04-29 RX ADMIN — MONTELUKAST 10 MILLIGRAM(S): 4 TABLET, CHEWABLE ORAL at 21:22

## 2019-04-29 RX ADMIN — Medication 81 MILLIGRAM(S): at 11:48

## 2019-04-29 RX ADMIN — Medication 120 MILLIGRAM(S): at 17:19

## 2019-04-29 RX ADMIN — GABAPENTIN 300 MILLIGRAM(S): 400 CAPSULE ORAL at 17:24

## 2019-04-29 RX ADMIN — HYDROMORPHONE HYDROCHLORIDE 4 MILLIGRAM(S): 2 INJECTION INTRAMUSCULAR; INTRAVENOUS; SUBCUTANEOUS at 12:30

## 2019-04-29 RX ADMIN — NYSTATIN CREAM 1 APPLICATION(S): 100000 CREAM TOPICAL at 14:37

## 2019-04-29 RX ADMIN — Medication 1: at 17:17

## 2019-04-29 RX ADMIN — Medication 0.25 MILLIGRAM(S): at 04:41

## 2019-04-29 RX ADMIN — Medication 100 MILLIGRAM(S): at 04:42

## 2019-04-29 RX ADMIN — Medication 120 MILLIGRAM(S): at 03:51

## 2019-04-29 RX ADMIN — HYDROMORPHONE HYDROCHLORIDE 4 MILLIGRAM(S): 2 INJECTION INTRAMUSCULAR; INTRAVENOUS; SUBCUTANEOUS at 03:42

## 2019-04-29 RX ADMIN — HYDROMORPHONE HYDROCHLORIDE 0.5 MILLIGRAM(S): 2 INJECTION INTRAMUSCULAR; INTRAVENOUS; SUBCUTANEOUS at 05:04

## 2019-04-29 RX ADMIN — HYDROMORPHONE HYDROCHLORIDE 4 MILLIGRAM(S): 2 INJECTION INTRAMUSCULAR; INTRAVENOUS; SUBCUTANEOUS at 11:59

## 2019-04-29 RX ADMIN — Medication 250 MILLIGRAM(S): at 14:28

## 2019-04-29 RX ADMIN — Medication 0.25 MILLIGRAM(S): at 14:32

## 2019-04-29 RX ADMIN — WARFARIN SODIUM 5 MILLIGRAM(S): 2.5 TABLET ORAL at 21:21

## 2019-04-29 RX ADMIN — Medication 5 MILLIGRAM(S): at 21:21

## 2019-04-29 RX ADMIN — Medication 40 MILLIGRAM(S): at 04:42

## 2019-04-29 RX ADMIN — Medication 100 MILLIGRAM(S): at 04:45

## 2019-04-29 RX ADMIN — HYDROMORPHONE HYDROCHLORIDE 4 MILLIGRAM(S): 2 INJECTION INTRAMUSCULAR; INTRAVENOUS; SUBCUTANEOUS at 09:51

## 2019-04-29 RX ADMIN — Medication 100 MILLIGRAM(S): at 17:24

## 2019-04-29 RX ADMIN — Medication 250 MILLIGRAM(S): at 06:22

## 2019-04-29 RX ADMIN — Medication 500 MILLIGRAM(S): at 11:48

## 2019-04-29 RX ADMIN — CEFEPIME 100 MILLIGRAM(S): 1 INJECTION, POWDER, FOR SOLUTION INTRAMUSCULAR; INTRAVENOUS at 05:09

## 2019-04-29 RX ADMIN — Medication 0.25 MILLIGRAM(S): at 09:51

## 2019-04-29 RX ADMIN — GABAPENTIN 300 MILLIGRAM(S): 400 CAPSULE ORAL at 04:43

## 2019-04-29 RX ADMIN — ATORVASTATIN CALCIUM 10 MILLIGRAM(S): 80 TABLET, FILM COATED ORAL at 21:21

## 2019-04-29 RX ADMIN — Medication 20 MILLIEQUIVALENT(S): at 16:34

## 2019-04-29 RX ADMIN — SENNA PLUS 2 TABLET(S): 8.6 TABLET ORAL at 21:21

## 2019-04-29 RX ADMIN — HYDROMORPHONE HYDROCHLORIDE 4 MILLIGRAM(S): 2 INJECTION INTRAMUSCULAR; INTRAVENOUS; SUBCUTANEOUS at 17:24

## 2019-04-30 LAB
ANION GAP SERPL CALC-SCNC: 5 MMOL/L — SIGNIFICANT CHANGE UP (ref 5–17)
BUN SERPL-MCNC: 6 MG/DL — LOW (ref 7–23)
CALCIUM SERPL-MCNC: 8.5 MG/DL — SIGNIFICANT CHANGE UP (ref 8.5–10.1)
CHLORIDE SERPL-SCNC: 96 MMOL/L — SIGNIFICANT CHANGE UP (ref 96–108)
CO2 SERPL-SCNC: 33 MMOL/L — HIGH (ref 22–31)
CREAT SERPL-MCNC: 0.48 MG/DL — LOW (ref 0.5–1.3)
GLUCOSE BLDC GLUCOMTR-MCNC: 140 MG/DL — HIGH (ref 70–99)
GLUCOSE BLDC GLUCOMTR-MCNC: 151 MG/DL — HIGH (ref 70–99)
GLUCOSE BLDC GLUCOMTR-MCNC: 183 MG/DL — HIGH (ref 70–99)
GLUCOSE BLDC GLUCOMTR-MCNC: 185 MG/DL — HIGH (ref 70–99)
GLUCOSE SERPL-MCNC: 154 MG/DL — HIGH (ref 70–99)
INR BLD: 2.73 RATIO — HIGH (ref 0.88–1.16)
POTASSIUM SERPL-MCNC: 3.1 MMOL/L — LOW (ref 3.5–5.3)
POTASSIUM SERPL-SCNC: 3.1 MMOL/L — LOW (ref 3.5–5.3)
PROTHROM AB SERPL-ACNC: 31.2 SEC — HIGH (ref 10–12.9)
SODIUM SERPL-SCNC: 134 MMOL/L — LOW (ref 135–145)
VANCOMYCIN TROUGH SERPL-MCNC: 11.6 UG/ML — SIGNIFICANT CHANGE UP (ref 10–20)

## 2019-04-30 RX ORDER — HYDROMORPHONE HYDROCHLORIDE 2 MG/ML
2 INJECTION INTRAMUSCULAR; INTRAVENOUS; SUBCUTANEOUS EVERY 8 HOURS
Qty: 0 | Refills: 0 | Status: DISCONTINUED | OUTPATIENT
Start: 2019-04-30 | End: 2019-05-06

## 2019-04-30 RX ORDER — POTASSIUM CHLORIDE 20 MEQ
40 PACKET (EA) ORAL EVERY 4 HOURS
Qty: 0 | Refills: 0 | Status: COMPLETED | OUTPATIENT
Start: 2019-04-30 | End: 2019-04-30

## 2019-04-30 RX ORDER — DOCUSATE SODIUM 100 MG
100 CAPSULE ORAL THREE TIMES A DAY
Qty: 0 | Refills: 0 | Status: DISCONTINUED | OUTPATIENT
Start: 2019-04-30 | End: 2019-05-06

## 2019-04-30 RX ORDER — ALPRAZOLAM 0.25 MG
0.25 TABLET ORAL
Qty: 0 | Refills: 0 | Status: DISCONTINUED | OUTPATIENT
Start: 2019-04-30 | End: 2019-05-06

## 2019-04-30 RX ADMIN — Medication 120 MILLIGRAM(S): at 17:13

## 2019-04-30 RX ADMIN — Medication 250 MILLIGRAM(S): at 21:27

## 2019-04-30 RX ADMIN — HYDROMORPHONE HYDROCHLORIDE 4 MILLIGRAM(S): 2 INJECTION INTRAMUSCULAR; INTRAVENOUS; SUBCUTANEOUS at 18:07

## 2019-04-30 RX ADMIN — Medication 40 MILLIGRAM(S): at 05:48

## 2019-04-30 RX ADMIN — WARFARIN SODIUM 5 MILLIGRAM(S): 2.5 TABLET ORAL at 21:27

## 2019-04-30 RX ADMIN — NYSTATIN CREAM 1 APPLICATION(S): 100000 CREAM TOPICAL at 13:25

## 2019-04-30 RX ADMIN — MONTELUKAST 10 MILLIGRAM(S): 4 TABLET, CHEWABLE ORAL at 21:27

## 2019-04-30 RX ADMIN — ATORVASTATIN CALCIUM 10 MILLIGRAM(S): 80 TABLET, FILM COATED ORAL at 21:27

## 2019-04-30 RX ADMIN — Medication 1: at 08:00

## 2019-04-30 RX ADMIN — Medication 250 MILLIGRAM(S): at 05:47

## 2019-04-30 RX ADMIN — Medication 100 MILLIGRAM(S): at 05:48

## 2019-04-30 RX ADMIN — Medication 100 MILLIGRAM(S): at 21:27

## 2019-04-30 RX ADMIN — HYDROMORPHONE HYDROCHLORIDE 4 MILLIGRAM(S): 2 INJECTION INTRAMUSCULAR; INTRAVENOUS; SUBCUTANEOUS at 22:54

## 2019-04-30 RX ADMIN — Medication 500 MILLIGRAM(S): at 11:22

## 2019-04-30 RX ADMIN — Medication 250 MILLIGRAM(S): at 13:23

## 2019-04-30 RX ADMIN — Medication 40 MILLIEQUIVALENT(S): at 11:25

## 2019-04-30 RX ADMIN — HYDROMORPHONE HYDROCHLORIDE 2 MILLIGRAM(S): 2 INJECTION INTRAMUSCULAR; INTRAVENOUS; SUBCUTANEOUS at 17:10

## 2019-04-30 RX ADMIN — HYDROMORPHONE HYDROCHLORIDE 4 MILLIGRAM(S): 2 INJECTION INTRAMUSCULAR; INTRAVENOUS; SUBCUTANEOUS at 11:19

## 2019-04-30 RX ADMIN — GABAPENTIN 300 MILLIGRAM(S): 400 CAPSULE ORAL at 17:12

## 2019-04-30 RX ADMIN — HYDROMORPHONE HYDROCHLORIDE 4 MILLIGRAM(S): 2 INJECTION INTRAMUSCULAR; INTRAVENOUS; SUBCUTANEOUS at 12:00

## 2019-04-30 RX ADMIN — GABAPENTIN 300 MILLIGRAM(S): 400 CAPSULE ORAL at 05:47

## 2019-04-30 RX ADMIN — NYSTATIN CREAM 1 APPLICATION(S): 100000 CREAM TOPICAL at 05:49

## 2019-04-30 RX ADMIN — Medication 1 APPLICATION(S): at 11:26

## 2019-04-30 RX ADMIN — HYDROMORPHONE HYDROCHLORIDE 4 MILLIGRAM(S): 2 INJECTION INTRAMUSCULAR; INTRAVENOUS; SUBCUTANEOUS at 05:47

## 2019-04-30 RX ADMIN — Medication 1: at 11:21

## 2019-04-30 RX ADMIN — Medication 40 MILLIEQUIVALENT(S): at 11:27

## 2019-04-30 RX ADMIN — Medication 120 MILLIGRAM(S): at 05:48

## 2019-04-30 RX ADMIN — Medication 81 MILLIGRAM(S): at 11:23

## 2019-04-30 RX ADMIN — Medication 5 MILLIGRAM(S): at 21:27

## 2019-04-30 RX ADMIN — SENNA PLUS 2 TABLET(S): 8.6 TABLET ORAL at 21:27

## 2019-04-30 RX ADMIN — NYSTATIN CREAM 1 APPLICATION(S): 100000 CREAM TOPICAL at 21:28

## 2019-04-30 RX ADMIN — Medication 100 MILLIGRAM(S): at 13:24

## 2019-04-30 RX ADMIN — CEFTRIAXONE 2000 MILLIGRAM(S): 500 INJECTION, POWDER, FOR SOLUTION INTRAMUSCULAR; INTRAVENOUS at 13:29

## 2019-05-01 DIAGNOSIS — I83.11 VARICOSE VEINS OF RIGHT LOWER EXTREMITY WITH INFLAMMATION: ICD-10-CM

## 2019-05-01 LAB
ANION GAP SERPL CALC-SCNC: 7 MMOL/L — SIGNIFICANT CHANGE UP (ref 5–17)
BUN SERPL-MCNC: 6 MG/DL — LOW (ref 7–23)
CALCIUM SERPL-MCNC: 8.2 MG/DL — LOW (ref 8.5–10.1)
CHLORIDE SERPL-SCNC: 96 MMOL/L — SIGNIFICANT CHANGE UP (ref 96–108)
CO2 SERPL-SCNC: 31 MMOL/L — SIGNIFICANT CHANGE UP (ref 22–31)
CREAT SERPL-MCNC: 0.59 MG/DL — SIGNIFICANT CHANGE UP (ref 0.5–1.3)
GLUCOSE BLDC GLUCOMTR-MCNC: 111 MG/DL — HIGH (ref 70–99)
GLUCOSE BLDC GLUCOMTR-MCNC: 159 MG/DL — HIGH (ref 70–99)
GLUCOSE BLDC GLUCOMTR-MCNC: 165 MG/DL — HIGH (ref 70–99)
GLUCOSE BLDC GLUCOMTR-MCNC: 169 MG/DL — HIGH (ref 70–99)
GLUCOSE SERPL-MCNC: 160 MG/DL — HIGH (ref 70–99)
HCT VFR BLD CALC: 38.2 % — SIGNIFICANT CHANGE UP (ref 34.5–45)
HGB BLD-MCNC: 12.2 G/DL — SIGNIFICANT CHANGE UP (ref 11.5–15.5)
INR BLD: 3.32 RATIO — HIGH (ref 0.88–1.16)
MAGNESIUM SERPL-MCNC: 1.6 MG/DL — SIGNIFICANT CHANGE UP (ref 1.6–2.6)
MCHC RBC-ENTMCNC: 30.2 PG — SIGNIFICANT CHANGE UP (ref 27–34)
MCHC RBC-ENTMCNC: 31.9 GM/DL — LOW (ref 32–36)
MCV RBC AUTO: 94.6 FL — SIGNIFICANT CHANGE UP (ref 80–100)
NRBC # BLD: 0 /100 WBCS — SIGNIFICANT CHANGE UP (ref 0–0)
PLATELET # BLD AUTO: 344 K/UL — SIGNIFICANT CHANGE UP (ref 150–400)
POTASSIUM SERPL-MCNC: 3.5 MMOL/L — SIGNIFICANT CHANGE UP (ref 3.5–5.3)
POTASSIUM SERPL-SCNC: 3.5 MMOL/L — SIGNIFICANT CHANGE UP (ref 3.5–5.3)
PROTHROM AB SERPL-ACNC: 38.2 SEC — HIGH (ref 10–12.9)
RBC # BLD: 4.04 M/UL — SIGNIFICANT CHANGE UP (ref 3.8–5.2)
RBC # FLD: 15.3 % — HIGH (ref 10.3–14.5)
SODIUM SERPL-SCNC: 134 MMOL/L — LOW (ref 135–145)
WBC # BLD: 9.64 K/UL — SIGNIFICANT CHANGE UP (ref 3.8–10.5)
WBC # FLD AUTO: 9.64 K/UL — SIGNIFICANT CHANGE UP (ref 3.8–10.5)

## 2019-05-01 PROCEDURE — 93970 EXTREMITY STUDY: CPT | Mod: 26

## 2019-05-01 PROCEDURE — 99221 1ST HOSP IP/OBS SF/LOW 40: CPT

## 2019-05-01 RX ORDER — KETOROLAC TROMETHAMINE 30 MG/ML
15 SYRINGE (ML) INJECTION ONCE
Qty: 0 | Refills: 0 | Status: DISCONTINUED | OUTPATIENT
Start: 2019-05-01 | End: 2019-05-01

## 2019-05-01 RX ORDER — DIPHENHYDRAMINE HCL 50 MG
25 CAPSULE ORAL ONCE
Qty: 0 | Refills: 0 | Status: COMPLETED | OUTPATIENT
Start: 2019-05-01 | End: 2019-05-01

## 2019-05-01 RX ORDER — KETOROLAC TROMETHAMINE 30 MG/ML
15 SYRINGE (ML) INJECTION EVERY 6 HOURS
Qty: 0 | Refills: 0 | Status: DISCONTINUED | OUTPATIENT
Start: 2019-05-01 | End: 2019-05-02

## 2019-05-01 RX ADMIN — CEFTRIAXONE 2000 MILLIGRAM(S): 500 INJECTION, POWDER, FOR SOLUTION INTRAMUSCULAR; INTRAVENOUS at 13:40

## 2019-05-01 RX ADMIN — Medication 5 MILLIGRAM(S): at 20:53

## 2019-05-01 RX ADMIN — Medication 120 MILLIGRAM(S): at 18:43

## 2019-05-01 RX ADMIN — Medication 40 MILLIGRAM(S): at 05:06

## 2019-05-01 RX ADMIN — Medication 500 MILLIGRAM(S): at 11:19

## 2019-05-01 RX ADMIN — HYDROMORPHONE HYDROCHLORIDE 4 MILLIGRAM(S): 2 INJECTION INTRAMUSCULAR; INTRAVENOUS; SUBCUTANEOUS at 11:56

## 2019-05-01 RX ADMIN — Medication 100 MILLIGRAM(S): at 20:53

## 2019-05-01 RX ADMIN — Medication 1: at 11:19

## 2019-05-01 RX ADMIN — HYDROMORPHONE HYDROCHLORIDE 2 MILLIGRAM(S): 2 INJECTION INTRAMUSCULAR; INTRAVENOUS; SUBCUTANEOUS at 20:53

## 2019-05-01 RX ADMIN — HYDROMORPHONE HYDROCHLORIDE 4 MILLIGRAM(S): 2 INJECTION INTRAMUSCULAR; INTRAVENOUS; SUBCUTANEOUS at 17:33

## 2019-05-01 RX ADMIN — MONTELUKAST 10 MILLIGRAM(S): 4 TABLET, CHEWABLE ORAL at 20:53

## 2019-05-01 RX ADMIN — Medication 1: at 07:52

## 2019-05-01 RX ADMIN — Medication 120 MILLIGRAM(S): at 05:06

## 2019-05-01 RX ADMIN — Medication 25 MILLIGRAM(S): at 05:06

## 2019-05-01 RX ADMIN — Medication 100 MILLIGRAM(S): at 05:06

## 2019-05-01 RX ADMIN — GABAPENTIN 300 MILLIGRAM(S): 400 CAPSULE ORAL at 17:33

## 2019-05-01 RX ADMIN — ATORVASTATIN CALCIUM 10 MILLIGRAM(S): 80 TABLET, FILM COATED ORAL at 20:53

## 2019-05-01 RX ADMIN — Medication 15 MILLIGRAM(S): at 15:04

## 2019-05-01 RX ADMIN — NYSTATIN CREAM 1 APPLICATION(S): 100000 CREAM TOPICAL at 13:40

## 2019-05-01 RX ADMIN — GABAPENTIN 300 MILLIGRAM(S): 400 CAPSULE ORAL at 05:06

## 2019-05-01 RX ADMIN — HYDROMORPHONE HYDROCHLORIDE 4 MILLIGRAM(S): 2 INJECTION INTRAMUSCULAR; INTRAVENOUS; SUBCUTANEOUS at 05:06

## 2019-05-01 RX ADMIN — Medication 250 MILLIGRAM(S): at 20:55

## 2019-05-01 RX ADMIN — Medication 250 MILLIGRAM(S): at 13:40

## 2019-05-01 RX ADMIN — HYDROMORPHONE HYDROCHLORIDE 2 MILLIGRAM(S): 2 INJECTION INTRAMUSCULAR; INTRAVENOUS; SUBCUTANEOUS at 15:57

## 2019-05-01 RX ADMIN — Medication 15 MILLIGRAM(S): at 16:49

## 2019-05-01 RX ADMIN — SENNA PLUS 2 TABLET(S): 8.6 TABLET ORAL at 20:53

## 2019-05-01 RX ADMIN — NYSTATIN CREAM 1 APPLICATION(S): 100000 CREAM TOPICAL at 05:06

## 2019-05-01 RX ADMIN — HYDROMORPHONE HYDROCHLORIDE 2 MILLIGRAM(S): 2 INJECTION INTRAMUSCULAR; INTRAVENOUS; SUBCUTANEOUS at 12:45

## 2019-05-01 RX ADMIN — HYDROMORPHONE HYDROCHLORIDE 4 MILLIGRAM(S): 2 INJECTION INTRAMUSCULAR; INTRAVENOUS; SUBCUTANEOUS at 11:19

## 2019-05-01 RX ADMIN — Medication 100 MILLIGRAM(S): at 13:40

## 2019-05-01 RX ADMIN — Medication 81 MILLIGRAM(S): at 11:19

## 2019-05-01 RX ADMIN — NYSTATIN CREAM 1 APPLICATION(S): 100000 CREAM TOPICAL at 20:53

## 2019-05-01 RX ADMIN — HYDROMORPHONE HYDROCHLORIDE 4 MILLIGRAM(S): 2 INJECTION INTRAMUSCULAR; INTRAVENOUS; SUBCUTANEOUS at 18:50

## 2019-05-01 RX ADMIN — Medication 250 MILLIGRAM(S): at 05:06

## 2019-05-01 NOTE — CHART NOTE - NSCHARTNOTEFT_GEN_A_CORE
Assessment:     *pt c/o persistent pain r/t LE swelling. Pt reports poor intake d/t pain and limited appetite. Recommend adding prosource gelatein BID to supplement intake. Noted intake 15% at one meal d/t not feeling well enough to eat. Other documented meals shows % completion. Recommend continuing to monitor and document in EMR.   *noted mild edema rt/lft feet showing improvement since initial assessment.   *d/w pt current diet restrictions and fluid restrictions; pt becomes aggravated when fluid restriction discussed. Consistent CHO diet added to order which is appropriate given PMH. POCT -185 over past 24 hours.   *pt c/o constipation r/t pain meds; pt reports x2 BM yesterday of hard pebbly stools and regular BM afterwards. Pt on bowel regimen and PRN; recommend monitoring and adjusting prn.   *documented 16# wt change since admission possibly d/t fluid shifting. Continue to monitor wt daily.   *labs noted: hyponatremia possibly r/t  edema  Recommendations:    1. add gelatein BID  2. encourage intake at each meal w/ adequate protein  3. monitor wt daily   4. reinforce diet edu PRN  5. monitor POCT FS  6. add MVI w minerals         Diet Presciption: Diet, DASH/TLC:   Sodium & Cholesterol Restricted  Consistent Carbohydrate {Evening Snack} (CSTCHOSN)  1500mL Fluid Restriction ([id number]) (05-01-19 @ 10:43)      Wt Hx:  Height (cm): 177.8 (04-16-19 @ 16:57)  Weight (kg): 95.3 (04-16-19 @ 16:57)  BMI (kg/m2): 30.1 (04-16-19 @ 16:57)        Estimated Needs:   [x ] no change since previous assessment    Estimated Protein Needs (1.4-1.6 g/kg):  · Weight  (lbs)  151  · Weight (kg)  68.5 kg  · From (1.4 g/kg)  95.9  · To (1.6 g/kg)  109.6    Estimated Fluid Needs (other amt-specify):  · Weight  (lbs)  206.1  · Weight (kg)  93.5  · Other ml/kg  1500        Nutrition Diagnosis is [x ] ongoing  [ ] resolved [ ] not applicable         New Nutrition Diagnosis: [x ] not applicable     Nutrition Diagnosis:   Nutrition Diagnostic #1:  · Nutrition Diagnostic Terminology #1: Knowledge and Beliefs  · Knowledge and Beliefs: Food - and nutrition - related knowledge deficit; Unsupported beliefs/attitudes about food or nutrition-relate; Not ready for diet/lifestyle change; Limited adherence to nutrition - related recommendations; Undesirable food choices  · Etiology: hx of DM and CHF  · Signs/Symptoms: self reports limited knowledge of dietary modifications, resistance r/t lifestyle changes  · Nutrition Intervention: Meals and Snack; Vitamin; Nutrition Education  · Meals and Snacks: General/healthful diet; Consistent CHO, DASH, 1500 FR  · Vitamin: Multivitamin/mineral  · Nutrition Education - Content: Priority modifications; Nutrition relationship to health/disease; Recommended modifications  · Goal/Expected Outcome: Pt will verbalize understanding of sources of CHO and sources of high Na foods          Pertinent Medications: MEDICATIONS  (STANDING):  ascorbic acid 500 milliGRAM(s) Oral daily  aspirin  chewable 81 milliGRAM(s) Oral daily  atorvastatin 10 milliGRAM(s) Oral at bedtime  cefTRIAXone Injectable. 2000 milliGRAM(s) IV Push every 24 hours  dextrose 5%. 1000 milliLiter(s) (50 mL/Hr) IV Continuous <Continuous>  dextrose 50% Injectable 12.5 Gram(s) IV Push once  dextrose 50% Injectable 25 Gram(s) IV Push once  dextrose 50% Injectable 25 Gram(s) IV Push once  diltiazem    Tablet 120 milliGRAM(s) Oral two times a day  docusate sodium 100 milliGRAM(s) Oral three times a day  furosemide    Tablet 40 milliGRAM(s) Oral daily  gabapentin 300 milliGRAM(s) Oral two times a day  HYDROmorphone   Tablet 4 milliGRAM(s) Oral every 6 hours  insulin lispro (HumaLOG) corrective regimen sliding scale   SubCutaneous three times a day before meals  melatonin 5 milliGRAM(s) Oral at bedtime  metoprolol succinate  milliGRAM(s) Oral daily  montelukast 10 milliGRAM(s) Oral at bedtime  nystatin Powder 1 Application(s) Topical three times a day  senna 2 Tablet(s) Oral at bedtime  silver sulfADIAZINE 1% Cream 1 Application(s) Topical daily  vancomycin  IVPB 750 milliGRAM(s) IV Intermittent every 8 hours  warfarin 5 milliGRAM(s) Oral at bedtime    MEDICATIONS  (PRN):  acetaminophen   Tablet .. 650 milliGRAM(s) Oral every 6 hours PRN Temp greater or equal to 38C (100.4F)  ALPRAZolam 0.25 milliGRAM(s) Oral two times a day PRN anxiety  dextrose 40% Gel 15 Gram(s) Oral once PRN Blood Glucose LESS THAN 70 milliGRAM(s)/deciliter  glucagon  Injectable 1 milliGRAM(s) IntraMuscular once PRN Glucose LESS THAN 70 milligrams/deciliter  HYDROmorphone  Injectable 2 milliGRAM(s) IV Push every 8 hours PRN breakthrough pain  ondansetron Injectable 4 milliGRAM(s) IV Push every 6 hours PRN Nausea and/or Vomiting    Pertinent Labs: 05-01 Na134 mmol/L<L> Glu 160 mg/dL<H> K+ 3.5 mmol/L Cr  0.59 mg/dL BUN 6 mg/dL<L> 04-17 [id number] 6.6 %<H>     CAPILLARY BLOOD GLUCOSE      POCT Blood Glucose.: 159 mg/dL (01 May 2019 11:18)  POCT Blood Glucose.: 169 mg/dL (01 May 2019 07:42)  POCT Blood Glucose.: 183 mg/dL (30 Apr 2019 21:17)  POCT Blood Glucose.: 140 mg/dL (30 Apr 2019 16:59)      Skin: [place] score = 21          Monitoring and Evaluation:   [x] PO intake/Nutr support infusion [ x ] Tolerance to Nutr [ x ] weights [ x ] labs[ x ] follow up per protocol  [ ] other:

## 2019-05-01 NOTE — CONSULT NOTE ADULT - ASSESSMENT
Painful, profoundly edematous plaques of legs, in gravity dependent areas;  Right lower leg likely unaffected due to recent use of compression wrap in this area;  Agree with ID that infection is unlikely, in this patient with continued iv antibiotic therapy  This is clinically consistent with exacerbation of lipodermatosclerosis, with gravity dependent pattern reflecting bed rest during recent hospitalization.  will be difficult to maintain mobility with patient's current level of discomfort;  will discuss with medical team regarding the use of short course of prednisone to shut down subcutaneous inflammation and maintain mobility;  restart of compression wraps may be prudent.

## 2019-05-01 NOTE — CONSULT NOTE ADULT - SUBJECTIVE AND OBJECTIVE BOX
68 y.o. female, with multiple medical problems, admitted 2 weeks ago with cellulitis complicating underlying stasis and severe edema of legs.  Currently on iv Vanco, was on Ceftriaxone which has been completed as per ID;    Left LE ulceration/cellulitis was improving, and wrap was recently removed from right LE;  Pt. recently noticed painful red areas on both legs, specifically on thighs.  Lower legs improved since admission.  Pt. c/o pain in these areas.        O:  pt. alert, cooperative;    LLe:  wrap in place  RLE:  profound changes consistent with long term stasis, edema and lipodermatosclerosis.  + tender, severely edematous areas of R>L medial and posterior thighs, with 3+ pitting edema of these areas.  No skin breakdown or surface changes.  anterior thighs, abdomen unaffected.

## 2019-05-02 LAB
GLUCOSE BLDC GLUCOMTR-MCNC: 194 MG/DL — HIGH (ref 70–99)
GLUCOSE BLDC GLUCOMTR-MCNC: 212 MG/DL — HIGH (ref 70–99)
GLUCOSE BLDC GLUCOMTR-MCNC: 266 MG/DL — HIGH (ref 70–99)
GLUCOSE BLDC GLUCOMTR-MCNC: 348 MG/DL — HIGH (ref 70–99)
INR BLD: 2.94 RATIO — HIGH (ref 0.88–1.16)
PROTHROM AB SERPL-ACNC: 33.7 SEC — HIGH (ref 10–12.9)

## 2019-05-02 RX ADMIN — HYDROMORPHONE HYDROCHLORIDE 2 MILLIGRAM(S): 2 INJECTION INTRAMUSCULAR; INTRAVENOUS; SUBCUTANEOUS at 09:08

## 2019-05-02 RX ADMIN — Medication 100 MILLIGRAM(S): at 21:58

## 2019-05-02 RX ADMIN — GABAPENTIN 300 MILLIGRAM(S): 400 CAPSULE ORAL at 05:02

## 2019-05-02 RX ADMIN — Medication 40 MILLIGRAM(S): at 05:02

## 2019-05-02 RX ADMIN — Medication 250 MILLIGRAM(S): at 13:38

## 2019-05-02 RX ADMIN — Medication 5 MILLIGRAM(S): at 21:58

## 2019-05-02 RX ADMIN — HYDROMORPHONE HYDROCHLORIDE 4 MILLIGRAM(S): 2 INJECTION INTRAMUSCULAR; INTRAVENOUS; SUBCUTANEOUS at 18:17

## 2019-05-02 RX ADMIN — ATORVASTATIN CALCIUM 10 MILLIGRAM(S): 80 TABLET, FILM COATED ORAL at 21:58

## 2019-05-02 RX ADMIN — Medication 15 MILLIGRAM(S): at 12:24

## 2019-05-02 RX ADMIN — HYDROMORPHONE HYDROCHLORIDE 2 MILLIGRAM(S): 2 INJECTION INTRAMUSCULAR; INTRAVENOUS; SUBCUTANEOUS at 22:47

## 2019-05-02 RX ADMIN — Medication 100 MILLIGRAM(S): at 05:02

## 2019-05-02 RX ADMIN — Medication 1: at 07:49

## 2019-05-02 RX ADMIN — Medication 120 MILLIGRAM(S): at 18:18

## 2019-05-02 RX ADMIN — Medication 100 MILLIGRAM(S): at 13:36

## 2019-05-02 RX ADMIN — NYSTATIN CREAM 1 APPLICATION(S): 100000 CREAM TOPICAL at 05:03

## 2019-05-02 RX ADMIN — Medication 250 MILLIGRAM(S): at 21:57

## 2019-05-02 RX ADMIN — HYDROMORPHONE HYDROCHLORIDE 2 MILLIGRAM(S): 2 INJECTION INTRAMUSCULAR; INTRAVENOUS; SUBCUTANEOUS at 23:00

## 2019-05-02 RX ADMIN — Medication 15 MILLIGRAM(S): at 06:12

## 2019-05-02 RX ADMIN — NYSTATIN CREAM 1 APPLICATION(S): 100000 CREAM TOPICAL at 13:43

## 2019-05-02 RX ADMIN — Medication 120 MILLIGRAM(S): at 05:01

## 2019-05-02 RX ADMIN — Medication 4: at 12:36

## 2019-05-02 RX ADMIN — Medication 1 APPLICATION(S): at 13:44

## 2019-05-02 RX ADMIN — Medication 81 MILLIGRAM(S): at 12:23

## 2019-05-02 RX ADMIN — Medication 500 MILLIGRAM(S): at 12:23

## 2019-05-02 RX ADMIN — Medication 650 MILLIGRAM(S): at 03:31

## 2019-05-02 RX ADMIN — SENNA PLUS 2 TABLET(S): 8.6 TABLET ORAL at 21:58

## 2019-05-02 RX ADMIN — HYDROMORPHONE HYDROCHLORIDE 4 MILLIGRAM(S): 2 INJECTION INTRAMUSCULAR; INTRAVENOUS; SUBCUTANEOUS at 05:01

## 2019-05-02 RX ADMIN — Medication 250 MILLIGRAM(S): at 05:03

## 2019-05-02 RX ADMIN — Medication 3: at 17:20

## 2019-05-02 RX ADMIN — HYDROMORPHONE HYDROCHLORIDE 2 MILLIGRAM(S): 2 INJECTION INTRAMUSCULAR; INTRAVENOUS; SUBCUTANEOUS at 07:47

## 2019-05-02 RX ADMIN — GABAPENTIN 300 MILLIGRAM(S): 400 CAPSULE ORAL at 18:17

## 2019-05-02 RX ADMIN — HYDROMORPHONE HYDROCHLORIDE 4 MILLIGRAM(S): 2 INJECTION INTRAMUSCULAR; INTRAVENOUS; SUBCUTANEOUS at 13:00

## 2019-05-02 RX ADMIN — WARFARIN SODIUM 5 MILLIGRAM(S): 2.5 TABLET ORAL at 21:58

## 2019-05-02 RX ADMIN — MONTELUKAST 10 MILLIGRAM(S): 4 TABLET, CHEWABLE ORAL at 21:58

## 2019-05-02 RX ADMIN — Medication 15 MILLIGRAM(S): at 13:19

## 2019-05-02 RX ADMIN — NYSTATIN CREAM 1 APPLICATION(S): 100000 CREAM TOPICAL at 22:00

## 2019-05-02 RX ADMIN — HYDROMORPHONE HYDROCHLORIDE 4 MILLIGRAM(S): 2 INJECTION INTRAMUSCULAR; INTRAVENOUS; SUBCUTANEOUS at 12:22

## 2019-05-02 RX ADMIN — Medication 60 MILLIGRAM(S): at 05:07

## 2019-05-02 RX ADMIN — HYDROMORPHONE HYDROCHLORIDE 4 MILLIGRAM(S): 2 INJECTION INTRAMUSCULAR; INTRAVENOUS; SUBCUTANEOUS at 23:35

## 2019-05-03 LAB
ANION GAP SERPL CALC-SCNC: 5 MMOL/L — SIGNIFICANT CHANGE UP (ref 5–17)
BUN SERPL-MCNC: 17 MG/DL — SIGNIFICANT CHANGE UP (ref 7–23)
CALCIUM SERPL-MCNC: 8.3 MG/DL — LOW (ref 8.5–10.1)
CHLORIDE SERPL-SCNC: 98 MMOL/L — SIGNIFICANT CHANGE UP (ref 96–108)
CO2 SERPL-SCNC: 33 MMOL/L — HIGH (ref 22–31)
CREAT SERPL-MCNC: 0.56 MG/DL — SIGNIFICANT CHANGE UP (ref 0.5–1.3)
GLUCOSE BLDC GLUCOMTR-MCNC: 177 MG/DL — HIGH (ref 70–99)
GLUCOSE BLDC GLUCOMTR-MCNC: 318 MG/DL — HIGH (ref 70–99)
GLUCOSE BLDC GLUCOMTR-MCNC: 326 MG/DL — HIGH (ref 70–99)
GLUCOSE SERPL-MCNC: 179 MG/DL — HIGH (ref 70–99)
HCT VFR BLD CALC: 33.8 % — LOW (ref 34.5–45)
HGB BLD-MCNC: 11.1 G/DL — LOW (ref 11.5–15.5)
INR BLD: 2.38 RATIO — HIGH (ref 0.88–1.16)
MCHC RBC-ENTMCNC: 30.6 PG — SIGNIFICANT CHANGE UP (ref 27–34)
MCHC RBC-ENTMCNC: 32.8 GM/DL — SIGNIFICANT CHANGE UP (ref 32–36)
MCV RBC AUTO: 93.1 FL — SIGNIFICANT CHANGE UP (ref 80–100)
NRBC # BLD: 0 /100 WBCS — SIGNIFICANT CHANGE UP (ref 0–0)
PLATELET # BLD AUTO: 376 K/UL — SIGNIFICANT CHANGE UP (ref 150–400)
POTASSIUM SERPL-MCNC: 3.3 MMOL/L — LOW (ref 3.5–5.3)
POTASSIUM SERPL-SCNC: 3.3 MMOL/L — LOW (ref 3.5–5.3)
PROTHROM AB SERPL-ACNC: 27.1 SEC — HIGH (ref 10–12.9)
RBC # BLD: 3.63 M/UL — LOW (ref 3.8–5.2)
RBC # FLD: 14.8 % — HIGH (ref 10.3–14.5)
SODIUM SERPL-SCNC: 136 MMOL/L — SIGNIFICANT CHANGE UP (ref 135–145)
WBC # BLD: 12.16 K/UL — HIGH (ref 3.8–10.5)
WBC # FLD AUTO: 12.16 K/UL — HIGH (ref 3.8–10.5)

## 2019-05-03 RX ORDER — HYDROMORPHONE HYDROCHLORIDE 2 MG/ML
1 INJECTION INTRAMUSCULAR; INTRAVENOUS; SUBCUTANEOUS ONCE
Qty: 0 | Refills: 0 | Status: DISCONTINUED | OUTPATIENT
Start: 2019-05-03 | End: 2019-05-03

## 2019-05-03 RX ORDER — POTASSIUM CHLORIDE 20 MEQ
40 PACKET (EA) ORAL EVERY 4 HOURS
Qty: 0 | Refills: 0 | Status: COMPLETED | OUTPATIENT
Start: 2019-05-03 | End: 2019-05-03

## 2019-05-03 RX ADMIN — Medication 40 MILLIGRAM(S): at 05:28

## 2019-05-03 RX ADMIN — GABAPENTIN 300 MILLIGRAM(S): 400 CAPSULE ORAL at 18:47

## 2019-05-03 RX ADMIN — Medication 60 MILLIGRAM(S): at 05:28

## 2019-05-03 RX ADMIN — HYDROMORPHONE HYDROCHLORIDE 4 MILLIGRAM(S): 2 INJECTION INTRAMUSCULAR; INTRAVENOUS; SUBCUTANEOUS at 23:10

## 2019-05-03 RX ADMIN — NYSTATIN CREAM 1 APPLICATION(S): 100000 CREAM TOPICAL at 14:17

## 2019-05-03 RX ADMIN — HYDROMORPHONE HYDROCHLORIDE 4 MILLIGRAM(S): 2 INJECTION INTRAMUSCULAR; INTRAVENOUS; SUBCUTANEOUS at 12:32

## 2019-05-03 RX ADMIN — Medication 100 MILLIGRAM(S): at 05:28

## 2019-05-03 RX ADMIN — MONTELUKAST 10 MILLIGRAM(S): 4 TABLET, CHEWABLE ORAL at 23:10

## 2019-05-03 RX ADMIN — SENNA PLUS 2 TABLET(S): 8.6 TABLET ORAL at 23:09

## 2019-05-03 RX ADMIN — HYDROMORPHONE HYDROCHLORIDE 4 MILLIGRAM(S): 2 INJECTION INTRAMUSCULAR; INTRAVENOUS; SUBCUTANEOUS at 05:31

## 2019-05-03 RX ADMIN — Medication 500 MILLIGRAM(S): at 12:32

## 2019-05-03 RX ADMIN — Medication 100 MILLIGRAM(S): at 14:17

## 2019-05-03 RX ADMIN — Medication 100 MILLIGRAM(S): at 23:10

## 2019-05-03 RX ADMIN — Medication 100 MILLIGRAM(S): at 05:31

## 2019-05-03 RX ADMIN — HYDROMORPHONE HYDROCHLORIDE 4 MILLIGRAM(S): 2 INJECTION INTRAMUSCULAR; INTRAVENOUS; SUBCUTANEOUS at 06:34

## 2019-05-03 RX ADMIN — Medication 81 MILLIGRAM(S): at 12:32

## 2019-05-03 RX ADMIN — Medication 1: at 08:10

## 2019-05-03 RX ADMIN — HYDROMORPHONE HYDROCHLORIDE 2 MILLIGRAM(S): 2 INJECTION INTRAMUSCULAR; INTRAVENOUS; SUBCUTANEOUS at 17:30

## 2019-05-03 RX ADMIN — HYDROMORPHONE HYDROCHLORIDE 4 MILLIGRAM(S): 2 INJECTION INTRAMUSCULAR; INTRAVENOUS; SUBCUTANEOUS at 00:10

## 2019-05-03 RX ADMIN — WARFARIN SODIUM 5 MILLIGRAM(S): 2.5 TABLET ORAL at 23:09

## 2019-05-03 RX ADMIN — HYDROMORPHONE HYDROCHLORIDE 4 MILLIGRAM(S): 2 INJECTION INTRAMUSCULAR; INTRAVENOUS; SUBCUTANEOUS at 19:08

## 2019-05-03 RX ADMIN — Medication 5 MILLIGRAM(S): at 23:09

## 2019-05-03 RX ADMIN — Medication 4: at 16:49

## 2019-05-03 RX ADMIN — ATORVASTATIN CALCIUM 10 MILLIGRAM(S): 80 TABLET, FILM COATED ORAL at 23:10

## 2019-05-03 RX ADMIN — HYDROMORPHONE HYDROCHLORIDE 1 MILLIGRAM(S): 2 INJECTION INTRAMUSCULAR; INTRAVENOUS; SUBCUTANEOUS at 04:20

## 2019-05-03 RX ADMIN — Medication 4: at 12:43

## 2019-05-03 RX ADMIN — Medication 40 MILLIEQUIVALENT(S): at 23:10

## 2019-05-03 RX ADMIN — HYDROMORPHONE HYDROCHLORIDE 1 MILLIGRAM(S): 2 INJECTION INTRAMUSCULAR; INTRAVENOUS; SUBCUTANEOUS at 04:11

## 2019-05-03 RX ADMIN — Medication 250 MILLIGRAM(S): at 05:32

## 2019-05-03 RX ADMIN — GABAPENTIN 300 MILLIGRAM(S): 400 CAPSULE ORAL at 05:28

## 2019-05-03 RX ADMIN — NYSTATIN CREAM 1 APPLICATION(S): 100000 CREAM TOPICAL at 23:11

## 2019-05-03 RX ADMIN — Medication 120 MILLIGRAM(S): at 18:47

## 2019-05-03 RX ADMIN — Medication 40 MILLIEQUIVALENT(S): at 18:46

## 2019-05-03 RX ADMIN — NYSTATIN CREAM 1 APPLICATION(S): 100000 CREAM TOPICAL at 05:33

## 2019-05-03 RX ADMIN — Medication 120 MILLIGRAM(S): at 05:29

## 2019-05-03 RX ADMIN — HYDROMORPHONE HYDROCHLORIDE 4 MILLIGRAM(S): 2 INJECTION INTRAMUSCULAR; INTRAVENOUS; SUBCUTANEOUS at 13:30

## 2019-05-03 RX ADMIN — HYDROMORPHONE HYDROCHLORIDE 4 MILLIGRAM(S): 2 INJECTION INTRAMUSCULAR; INTRAVENOUS; SUBCUTANEOUS at 18:45

## 2019-05-03 RX ADMIN — HYDROMORPHONE HYDROCHLORIDE 2 MILLIGRAM(S): 2 INJECTION INTRAMUSCULAR; INTRAVENOUS; SUBCUTANEOUS at 16:45

## 2019-05-03 RX ADMIN — Medication 1 APPLICATION(S): at 12:32

## 2019-05-03 NOTE — PROGRESS NOTE ADULT - NSHPATTENDINGPLANDISCUSS_GEN_ALL_CORE
Dr. Hernandez and  at bedside
 at bedside
 at bedside
Dr. Hernandez and  at bedside
Dr. Hernandez and  at bedside

## 2019-05-04 LAB
GLUCOSE BLDC GLUCOMTR-MCNC: 161 MG/DL — HIGH (ref 70–99)
GLUCOSE BLDC GLUCOMTR-MCNC: 331 MG/DL — HIGH (ref 70–99)
GLUCOSE BLDC GLUCOMTR-MCNC: 376 MG/DL — HIGH (ref 70–99)
INR BLD: 2.4 RATIO — HIGH (ref 0.88–1.16)
PROTHROM AB SERPL-ACNC: 27.4 SEC — HIGH (ref 10–12.9)

## 2019-05-04 RX ORDER — CEFUROXIME AXETIL 250 MG
250 TABLET ORAL EVERY 12 HOURS
Qty: 0 | Refills: 0 | Status: DISCONTINUED | OUTPATIENT
Start: 2019-05-04 | End: 2019-05-06

## 2019-05-04 RX ADMIN — MONTELUKAST 10 MILLIGRAM(S): 4 TABLET, CHEWABLE ORAL at 21:51

## 2019-05-04 RX ADMIN — Medication 4: at 17:13

## 2019-05-04 RX ADMIN — Medication 100 MILLIGRAM(S): at 21:51

## 2019-05-04 RX ADMIN — ATORVASTATIN CALCIUM 10 MILLIGRAM(S): 80 TABLET, FILM COATED ORAL at 21:51

## 2019-05-04 RX ADMIN — HYDROMORPHONE HYDROCHLORIDE 4 MILLIGRAM(S): 2 INJECTION INTRAMUSCULAR; INTRAVENOUS; SUBCUTANEOUS at 05:21

## 2019-05-04 RX ADMIN — HYDROMORPHONE HYDROCHLORIDE 4 MILLIGRAM(S): 2 INJECTION INTRAMUSCULAR; INTRAVENOUS; SUBCUTANEOUS at 17:18

## 2019-05-04 RX ADMIN — Medication 500 MILLIGRAM(S): at 11:43

## 2019-05-04 RX ADMIN — Medication 250 MILLIGRAM(S): at 17:20

## 2019-05-04 RX ADMIN — GABAPENTIN 300 MILLIGRAM(S): 400 CAPSULE ORAL at 17:20

## 2019-05-04 RX ADMIN — GABAPENTIN 300 MILLIGRAM(S): 400 CAPSULE ORAL at 05:21

## 2019-05-04 RX ADMIN — NYSTATIN CREAM 1 APPLICATION(S): 100000 CREAM TOPICAL at 21:53

## 2019-05-04 RX ADMIN — Medication 100 MILLIGRAM(S): at 05:21

## 2019-05-04 RX ADMIN — Medication 40 MILLIGRAM(S): at 05:21

## 2019-05-04 RX ADMIN — Medication 5 MILLIGRAM(S): at 21:52

## 2019-05-04 RX ADMIN — HYDROMORPHONE HYDROCHLORIDE 2 MILLIGRAM(S): 2 INJECTION INTRAMUSCULAR; INTRAVENOUS; SUBCUTANEOUS at 02:19

## 2019-05-04 RX ADMIN — Medication 5: at 13:29

## 2019-05-04 RX ADMIN — Medication 60 MILLIGRAM(S): at 05:21

## 2019-05-04 RX ADMIN — Medication 120 MILLIGRAM(S): at 17:12

## 2019-05-04 RX ADMIN — HYDROMORPHONE HYDROCHLORIDE 4 MILLIGRAM(S): 2 INJECTION INTRAMUSCULAR; INTRAVENOUS; SUBCUTANEOUS at 11:44

## 2019-05-04 RX ADMIN — Medication 120 MILLIGRAM(S): at 05:20

## 2019-05-04 RX ADMIN — Medication 1 APPLICATION(S): at 08:38

## 2019-05-04 RX ADMIN — NYSTATIN CREAM 1 APPLICATION(S): 100000 CREAM TOPICAL at 05:21

## 2019-05-04 RX ADMIN — Medication 1: at 08:29

## 2019-05-04 RX ADMIN — WARFARIN SODIUM 5 MILLIGRAM(S): 2.5 TABLET ORAL at 21:52

## 2019-05-04 RX ADMIN — Medication 81 MILLIGRAM(S): at 11:43

## 2019-05-04 RX ADMIN — SENNA PLUS 2 TABLET(S): 8.6 TABLET ORAL at 21:51

## 2019-05-04 RX ADMIN — Medication 100 MILLIGRAM(S): at 05:20

## 2019-05-05 LAB
ANION GAP SERPL CALC-SCNC: 3 MMOL/L — LOW (ref 5–17)
BUN SERPL-MCNC: 21 MG/DL — SIGNIFICANT CHANGE UP (ref 7–23)
CALCIUM SERPL-MCNC: 8.7 MG/DL — SIGNIFICANT CHANGE UP (ref 8.5–10.1)
CHLORIDE SERPL-SCNC: 102 MMOL/L — SIGNIFICANT CHANGE UP (ref 96–108)
CO2 SERPL-SCNC: 31 MMOL/L — SIGNIFICANT CHANGE UP (ref 22–31)
CREAT SERPL-MCNC: 0.62 MG/DL — SIGNIFICANT CHANGE UP (ref 0.5–1.3)
GLUCOSE BLDC GLUCOMTR-MCNC: 170 MG/DL — HIGH (ref 70–99)
GLUCOSE BLDC GLUCOMTR-MCNC: 213 MG/DL — HIGH (ref 70–99)
GLUCOSE BLDC GLUCOMTR-MCNC: 239 MG/DL — HIGH (ref 70–99)
GLUCOSE BLDC GLUCOMTR-MCNC: 360 MG/DL — HIGH (ref 70–99)
GLUCOSE SERPL-MCNC: 166 MG/DL — HIGH (ref 70–99)
INR BLD: 2.72 RATIO — HIGH (ref 0.88–1.16)
POTASSIUM SERPL-MCNC: 3.7 MMOL/L — SIGNIFICANT CHANGE UP (ref 3.5–5.3)
POTASSIUM SERPL-SCNC: 3.7 MMOL/L — SIGNIFICANT CHANGE UP (ref 3.5–5.3)
PROTHROM AB SERPL-ACNC: 31.1 SEC — HIGH (ref 10–12.9)
SODIUM SERPL-SCNC: 136 MMOL/L — SIGNIFICANT CHANGE UP (ref 135–145)

## 2019-05-05 RX ADMIN — HYDROMORPHONE HYDROCHLORIDE 4 MILLIGRAM(S): 2 INJECTION INTRAMUSCULAR; INTRAVENOUS; SUBCUTANEOUS at 23:44

## 2019-05-05 RX ADMIN — HYDROMORPHONE HYDROCHLORIDE 4 MILLIGRAM(S): 2 INJECTION INTRAMUSCULAR; INTRAVENOUS; SUBCUTANEOUS at 12:34

## 2019-05-05 RX ADMIN — Medication 60 MILLIGRAM(S): at 05:07

## 2019-05-05 RX ADMIN — Medication 120 MILLIGRAM(S): at 17:23

## 2019-05-05 RX ADMIN — GABAPENTIN 300 MILLIGRAM(S): 400 CAPSULE ORAL at 05:12

## 2019-05-05 RX ADMIN — Medication 100 MILLIGRAM(S): at 05:09

## 2019-05-05 RX ADMIN — SENNA PLUS 2 TABLET(S): 8.6 TABLET ORAL at 21:46

## 2019-05-05 RX ADMIN — MONTELUKAST 10 MILLIGRAM(S): 4 TABLET, CHEWABLE ORAL at 21:46

## 2019-05-05 RX ADMIN — Medication 2: at 09:04

## 2019-05-05 RX ADMIN — Medication 100 MILLIGRAM(S): at 14:17

## 2019-05-05 RX ADMIN — Medication 81 MILLIGRAM(S): at 10:57

## 2019-05-05 RX ADMIN — Medication 100 MILLIGRAM(S): at 05:12

## 2019-05-05 RX ADMIN — HYDROMORPHONE HYDROCHLORIDE 4 MILLIGRAM(S): 2 INJECTION INTRAMUSCULAR; INTRAVENOUS; SUBCUTANEOUS at 17:25

## 2019-05-05 RX ADMIN — Medication 250 MILLIGRAM(S): at 17:22

## 2019-05-05 RX ADMIN — ATORVASTATIN CALCIUM 10 MILLIGRAM(S): 80 TABLET, FILM COATED ORAL at 21:45

## 2019-05-05 RX ADMIN — HYDROMORPHONE HYDROCHLORIDE 2 MILLIGRAM(S): 2 INJECTION INTRAMUSCULAR; INTRAVENOUS; SUBCUTANEOUS at 10:57

## 2019-05-05 RX ADMIN — Medication 250 MILLIGRAM(S): at 05:09

## 2019-05-05 RX ADMIN — Medication 40 MILLIGRAM(S): at 05:12

## 2019-05-05 RX ADMIN — HYDROMORPHONE HYDROCHLORIDE 4 MILLIGRAM(S): 2 INJECTION INTRAMUSCULAR; INTRAVENOUS; SUBCUTANEOUS at 00:44

## 2019-05-05 RX ADMIN — GABAPENTIN 300 MILLIGRAM(S): 400 CAPSULE ORAL at 17:25

## 2019-05-05 RX ADMIN — NYSTATIN CREAM 1 APPLICATION(S): 100000 CREAM TOPICAL at 05:10

## 2019-05-05 RX ADMIN — Medication 120 MILLIGRAM(S): at 05:08

## 2019-05-05 RX ADMIN — NYSTATIN CREAM 1 APPLICATION(S): 100000 CREAM TOPICAL at 21:46

## 2019-05-05 RX ADMIN — WARFARIN SODIUM 5 MILLIGRAM(S): 2.5 TABLET ORAL at 21:46

## 2019-05-05 RX ADMIN — Medication 500 MILLIGRAM(S): at 10:57

## 2019-05-05 RX ADMIN — HYDROMORPHONE HYDROCHLORIDE 4 MILLIGRAM(S): 2 INJECTION INTRAMUSCULAR; INTRAVENOUS; SUBCUTANEOUS at 05:12

## 2019-05-05 RX ADMIN — Medication 5 MILLIGRAM(S): at 21:46

## 2019-05-05 RX ADMIN — Medication 5: at 12:32

## 2019-05-05 RX ADMIN — Medication 1 APPLICATION(S): at 17:09

## 2019-05-05 RX ADMIN — Medication 100 MILLIGRAM(S): at 21:46

## 2019-05-05 RX ADMIN — Medication 2: at 17:20

## 2019-05-06 ENCOUNTER — TRANSCRIPTION ENCOUNTER (OUTPATIENT)
Age: 69
End: 2019-05-06

## 2019-05-06 VITALS
RESPIRATION RATE: 18 BRPM | TEMPERATURE: 98 F | SYSTOLIC BLOOD PRESSURE: 117 MMHG | DIASTOLIC BLOOD PRESSURE: 72 MMHG | OXYGEN SATURATION: 94 % | HEART RATE: 80 BPM

## 2019-05-06 LAB
GLUCOSE BLDC GLUCOMTR-MCNC: 196 MG/DL — HIGH (ref 70–99)
GLUCOSE BLDC GLUCOMTR-MCNC: 328 MG/DL — HIGH (ref 70–99)

## 2019-05-06 RX ORDER — HYDROMORPHONE HYDROCHLORIDE 2 MG/ML
1 INJECTION INTRAMUSCULAR; INTRAVENOUS; SUBCUTANEOUS
Qty: 0 | Refills: 0 | DISCHARGE
Start: 2019-05-06

## 2019-05-06 RX ADMIN — Medication 100 MILLIGRAM(S): at 05:08

## 2019-05-06 RX ADMIN — HYDROMORPHONE HYDROCHLORIDE 4 MILLIGRAM(S): 2 INJECTION INTRAMUSCULAR; INTRAVENOUS; SUBCUTANEOUS at 12:18

## 2019-05-06 RX ADMIN — GABAPENTIN 300 MILLIGRAM(S): 400 CAPSULE ORAL at 05:08

## 2019-05-06 RX ADMIN — Medication 4: at 12:16

## 2019-05-06 RX ADMIN — Medication 81 MILLIGRAM(S): at 12:17

## 2019-05-06 RX ADMIN — Medication 60 MILLIGRAM(S): at 05:08

## 2019-05-06 RX ADMIN — HYDROMORPHONE HYDROCHLORIDE 2 MILLIGRAM(S): 2 INJECTION INTRAMUSCULAR; INTRAVENOUS; SUBCUTANEOUS at 08:19

## 2019-05-06 RX ADMIN — Medication 100 MILLIGRAM(S): at 14:47

## 2019-05-06 RX ADMIN — NYSTATIN CREAM 1 APPLICATION(S): 100000 CREAM TOPICAL at 05:10

## 2019-05-06 RX ADMIN — Medication 120 MILLIGRAM(S): at 05:09

## 2019-05-06 RX ADMIN — Medication 40 MILLIGRAM(S): at 05:08

## 2019-05-06 RX ADMIN — Medication 1: at 07:41

## 2019-05-06 RX ADMIN — Medication 250 MILLIGRAM(S): at 05:08

## 2019-05-06 RX ADMIN — HYDROMORPHONE HYDROCHLORIDE 4 MILLIGRAM(S): 2 INJECTION INTRAMUSCULAR; INTRAVENOUS; SUBCUTANEOUS at 05:08

## 2019-05-06 RX ADMIN — Medication 500 MILLIGRAM(S): at 12:18

## 2019-05-06 NOTE — PHYSICAL EXAM
[2+] : left 2+ [1+] : left 1+ [Ankle Swelling (On Exam)] : present [Ankle Swelling Bilaterally] : bilaterally  [] : bilaterally [Ankle Swelling On The Left] : moderate [Alert] : alert [Oriented to Person] : oriented to person [Oriented to Place] : oriented to place [Oriented to Time] : oriented to time [Calm] : calm [de-identified] : WD, WN, NAD. Awake, alert, interactive. Ambulates slowly with a cane for support. [de-identified] : LETICIA, PERSHASHAL [de-identified] : supple [de-identified] : There is swelling and considerable ecchymosis to dorsal right foot. Pain with slight toe movements. [de-identified] : WWP. Ulcer to right posterior calf 8 cm (l) x 1 cm (w). no active drainage. There are venous stasis skin changes: venous stasis hyperpigmentation and hyperkeratosis. No s/s of infection.

## 2019-05-06 NOTE — ASSESSMENT
[FreeTextEntry1] : 67 y/o woman with lymphedema and stasis ulceration, with 2 wounds that are new in the dorsum of foot in addition to posterior calf. Overall today continues to improve. Cleansed wounds with soap and water and redressed with Xeroform and ace bandage. Granulating nicely and significantly smaller in size.\par \par Today the lower leg seems improving, will continue Santyl.\par \par -ACE bandage for compression\par -Ambulation elevation\par -PO antibiotics\par -RTC next week

## 2019-05-06 NOTE — HISTORY OF PRESENT ILLNESS
[FreeTextEntry1] : The patient is a 69 y/o female with pain and swelling to BLE for 3 months. There is an ulcer to the back of her right calf. There is no weeping of drainage of wound or legs. She has been using Santyl and Silvadene for wound healing. She was previously seen by Dr Rivera who debrided the wound and applied unna boots with limited improvement. Comorbidities include DM2, HTN and A Fib. She takes ASA 81 mg, Coumadin and Pravastatin 10 mg QD [de-identified] : 67 y/o female RTC for follow up, now using Santyl, Xeroform and ace twice/week, feels and looks better. Wound is smaller an granulating. She has been wearing an Unna Boot since before coming to our office in October 2018.

## 2019-05-06 NOTE — DISCHARGE NOTE PROVIDER - NSDCCPCAREPLAN_GEN_ALL_CORE_FT
PRINCIPAL DISCHARGE DIAGNOSIS  Diagnosis: Cellulitis  Assessment and Plan of Treatment: follow up with Dr Yoo

## 2019-05-06 NOTE — DISCHARGE NOTE PROVIDER - HOSPITAL COURSE
69 y/o F with PMH of HTN, dyslipidemia, Diastolic CHF, a-fib (on coumadin), chronic venous insufficiency, DM2, thyroid nodule, ulcerative colitis, p/w LLE erythema / pain. Patient states when she woke up she felt fine, and then she went to work and around 10:30am she started to feel burning pain on LLE. Also noticed LE was erythematous, warm and more swollen than usual. Denies any changes in RLE. +Subjective fevers +feels cold. Denies CP, SOB, cough, runny nose, sore throat, fever, chills, nausea, vomiting, abdominal pain, dysuria, increased urinary frequency.   Pt with severe leg pain due to her LE infection/wound.  She is being followed by vascular.  ID on board for IV antibiotic management.  Pt with a lot of pain requiring IV dilaudid.  She is ambulating with PT.  Her Hospital course complicated by severe pain with difficulty to treat on oral pain meds.  She has ongoing leg pain due to ulceration/lymphadema with superimposed cellulitis.  I reassured her that her leg is improving but will take time.  She is mildly anxious but still requiring a lot of opioid therapy for symptomatic care.  Hospital course further complicated by worsening erythema requiring antibiotics to be changed.          4/30: Pt still uncomfortable, with pain.  No chills, n, v.    05/01/19: Patient seen and examined. Now complaining of right thigh redness and pain. Discussed with patient and  at bed side regarding management plan. Discussed with Dr Hoyos.     05/02/19: Patient seen and examined. Redness improving after starting steroid.     05/03/19: Patient seen and examined. Leg redness and pain improving.     05/04/19: Patient seen and examined. No new complaints. Pain improving.     05/05/19: Patient seen and examined. No new complaints.             Vital Signs Last 24 Hrs    T(C): 36.8 (06 May 2019 11:49), Max: 36.8 (06 May 2019 11:49)    T(F): 98.2 (06 May 2019 11:49), Max: 98.2 (06 May 2019 11:49)    HR: 80 (06 May 2019 11:49) (70 - 94)    BP: 117/72 (06 May 2019 11:49) (117/72 - 153/68)    BP(mean): --    RR: 18 (06 May 2019 11:49) (18 - 18)    SpO2: 94% (06 May 2019 11:49) (94% - 99%)                PHYSICAL EXAM:        Constitutional: awake and alert, mild distress    HEENT: PERR, EOMI, Normal Hearing, MMM    Neck: Soft and supple    Respiratory: Breath sounds are clear bilaterally, No wheezing, rales or rhonchi    Cardiovascular: S1 and S2, regular rate and rhythm, no Murmurs, gallops or rubs    Gastrointestinal: Bowel Sounds present, large abd, soft, nontender, nondistended, no guarding, no rebound    Extremities: leg edema b/l greatly improved, erythema improving    Neurological: A/O x 3, no focal deficits                     Assessment and Plan:  69 y/o F with PMH of HTN, dyslipidemia, Diastolic CHF, a-fib (on coumadin), chronic venous insufficiency, DM2, thyroid nodule, ulcerative colitis, p/w LLE erythema / pain        Severe sepsis 2/2 LE bilateral cellulitis w/ chronic venous stasis    Lipodermatosclerosis as per derm    -sepsis resolved    -cefepime changed to ceftriaxone    -S/P IV vanco, now on ceftin    -Dr Andrade consult appreciated     -Completed steroid 5 days al together    -Blood culture: no growth    -ID follow up appreciated     -Vascular following, s/p debridement.    -c/w wound care as per vascular    -pain management, transitioned to oral        *Chr Diastolic CHF    -Continue lasix     -kcl supplementation        * Hypokalemia due to lasix    replaced         *DM2    -Humalog ISS    -Diabetic diet         *A-fib    -Continue coumadin for goal INR 2-3        *HTN / dyslipidemia / ulcerative colitis / thyroid nodule     -C/w home meds and f/u outpatient for further management        Home on home care today    PMD aware of discharge    Spent more than 30 min to prepare the discharge.

## 2019-05-06 NOTE — PROGRESS NOTE ADULT - SUBJECTIVE AND OBJECTIVE BOX
67 y/o F with PMH of HTN, dyslipidemia, Diastolic CHF, a-fib (on coumadin), chronic venous insufficiency, DM2, thyroid nodule, ulcerative colitis, p/w LLE erythema / pain. Patient states when she woke up she felt fine, and then she went to work and around 10:30am she started to feel burning pain on LLE. Also noticed LE was erythematous, warm and more swollen than usual. Denies any changes in RLE. +Subjective fevers +feels cold. Denies CP, SOB, cough, runny nose, sore throat, fever, chills, nausea, vomiting, abdominal pain, dysuria, increased urinary frequency.         04/17/19; Patient seen and examined. Complaining of leg pain.   04/18/19: Patient seen and examined. Complaining of more leg pain since ace bandage.   04/19/19: Patient seen and examined. Still with pain.   04/20/19: Patient seen and examined. Pain a little better today, but still with significant amount of pain requiring IV dilaudid.   04/21/19: Patient seen and examined. Pain improving slowly.       Vital Signs Last 24 Hrs  T(C): 36.3 (21 Apr 2019 05:30), Max: 37.7 (20 Apr 2019 18:41)  T(F): 97.4 (21 Apr 2019 05:30), Max: 99.8 (20 Apr 2019 18:41)  HR: 80 (21 Apr 2019 05:30) (62 - 80)  BP: 159/82 (21 Apr 2019 05:30) (137/83 - 159/82)  BP(mean): --  RR: 18 (21 Apr 2019 05:30) (18 - 18)  SpO2: 94% (21 Apr 2019 05:30) (94% - 98%)          Physical Exam:      · Constitutional	Well-developed, well nourished	  · Eyes	EOMI; PERRL; no drainage or redness	  · ENMT	No oral lesions; no gross abnormalities	  · Respiratory	Breath Sounds equal & clear to percussion & auscultation, no accessory muscle use	  · Cardiovascular	Regular rate & rhythm, normal S1, S2; no murmurs, gallops or rubs; no S3, S4	  · Gastrointestinal	Soft, non-tender, no hepatosplenomegaly, normal bowel sounds	  · Extremities	detailed exam	  · Extremities Comments	+LLE swelling / erythema / warmth, some open wounds and weeping, +fluid filled blisters	  · Neurological	detailed exam	  · Neurological Details	alert and oriented x 3	                          Labs:                                      11.9   9.16  )-----------( 340      ( 20 Apr 2019 07:40 )             36.8     21 Apr 2019 05:35    139    |  104    |  17     ----------------------------<  225    4.0     |  28     |  0.56     Ca    8.3        21 Apr 2019 05:35        PT/INR - ( 21 Apr 2019 05:35 )   PT: 31.4 sec;   INR: 2.74 ratio           CAPILLARY BLOOD GLUCOSE      POCT Blood Glucose.: 238 mg/dL (21 Apr 2019 12:42)  POCT Blood Glucose.: 185 mg/dL (21 Apr 2019 08:20)  POCT Blood Glucose.: 221 mg/dL (20 Apr 2019 21:18)  POCT Blood Glucose.: 189 mg/dL (20 Apr 2019 17:25)                  MEDICATIONS:    ascorbic acid 500 milliGRAM(s) Oral daily  aspirin  chewable 81 milliGRAM(s) Oral daily  atorvastatin 10 milliGRAM(s) Oral at bedtime  cefepime   IVPB 2000 milliGRAM(s) IV Intermittent every 12 hours  dextrose 5%. 1000 milliLiter(s) (50 mL/Hr) IV Continuous <Continuous>  dextrose 50% Injectable 12.5 Gram(s) IV Push once  dextrose 50% Injectable 25 Gram(s) IV Push once  dextrose 50% Injectable 25 Gram(s) IV Push once  diltiazem    Tablet 120 milliGRAM(s) Oral two times a day  gabapentin 300 milliGRAM(s) Oral daily  heparin  Injectable 5000 Unit(s) SubCutaneous every 8 hours  insulin lispro (HumaLOG) corrective regimen sliding scale   SubCutaneous three times a day before meals  metoprolol succinate  milliGRAM(s) Oral daily  montelukast 10 milliGRAM(s) Oral at bedtime  nystatin Powder 1 Application(s) Topical three times a day  potassium chloride    Tablet ER 40 milliEquivalent(s) Oral every 4 hours  vancomycin  IVPB 1000 milliGRAM(s) IV Intermittent every 12 hours  warfarin 7.5 milliGRAM(s) Oral once    MEDICATIONS  (PRN):  acetaminophen   Tablet .. 650 milliGRAM(s) Oral every 6 hours PRN Temp greater or equal to 38C (100.4F)  dextrose 40% Gel 15 Gram(s) Oral once PRN Blood Glucose LESS THAN 70 milliGRAM(s)/deciliter  docusate sodium 100 milliGRAM(s) Oral three times a day PRN Constipation  glucagon  Injectable 1 milliGRAM(s) IntraMuscular once PRN Glucose LESS THAN 70 milligrams/deciliter  HYDROmorphone  Injectable 1 milliGRAM(s) IV Push every 4 hours PRN Severe Pain (7 - 10)  HYDROmorphone  Injectable 0.5 milliGRAM(s) IV Push every 6 hours PRN Moderate Pain (4 - 6)  oxyCODONE    IR 10 milliGRAM(s) Oral every 4 hours PRN Moderate Pain (4 - 6)  oxyCODONE    IR 5 milliGRAM(s) Oral every 4 hours PRN Mild Pain (1 - 3)  zolpidem 5 milliGRAM(s) Oral at bedtime PRN Insomnia          Assessment and Plan:   Assessment:  · Assessment		  67 y/o F with PMH of HTN, dyslipidemia, Diastolic CHF, a-fib (on coumadin), chronic venous insufficiency, DM2, thyroid nodule, ulcerative colitis, p/w LLE erythema / pain    *Severe sepsis 2/2 LE cellulitis  Chr venous stasis  -Continue IV vanco and cefepime  -Blood culture: no growth  -ID follow up appreciated   -Vascular consult appreciated   -Lactic acidosis -> resolved    *Hypokalemia due to lasix  Replaced  Follow    *Diastolic CHF  Continue  lasix due to leg edema and CHF    *DM2  -Humalog ISS  -Diabetic diet     *A-fib  - INR therapeutic today  -Continue coumadin   -INR therapeutic, follow    *HTN / dyslipidemia / ulcerative colitis / thyroid nodule   -C/w home meds and f/u outpatient for further management     *DVT ppx  -On coumadin  INR therapeutic
69 y/o F with PMH of HTN, dyslipidemia, Diastolic CHF, a-fib (on coumadin), chronic venous insufficiency, DM2, thyroid nodule, ulcerative colitis, p/w LLE erythema / pain. Patient states when she woke up she felt fine, and then she went to work and around 10:30am she started to feel burning pain on LLE. Also noticed LE was erythematous, warm and more swollen than usual. Denies any changes in RLE. +Subjective fevers +feels cold. Denies CP, SOB, cough, runny nose, sore throat, fever, chills, nausea, vomiting, abdominal pain, dysuria, increased urinary frequency.         04/17/19; Patient seen and examined. Complaining of leg pain.   04/18/19: Patient seen and examined. Complaining of more leg pain since ace bandage.   04/19/19: Patient seen and examined. Still with pain.       Vital Signs Last 24 Hrs  T(C): 37.1 (19 Apr 2019 12:36), Max: 37.6 (18 Apr 2019 20:21)  T(F): 98.8 (19 Apr 2019 12:36), Max: 99.6 (18 Apr 2019 20:21)  HR: 83 (19 Apr 2019 12:36) (80 - 93)  BP: 120/54 (19 Apr 2019 12:36) (106/54 - 145/88)  BP(mean): --  RR: 19 (19 Apr 2019 12:36) (18 - 19)  SpO2: 96% (19 Apr 2019 12:36) (93% - 98%)          Physical Exam:      · Constitutional	Well-developed, well nourished	  · Eyes	EOMI; PERRL; no drainage or redness	  · ENMT	No oral lesions; no gross abnormalities	  · Respiratory	Breath Sounds equal & clear to percussion & auscultation, no accessory muscle use	  · Cardiovascular	Regular rate & rhythm, normal S1, S2; no murmurs, gallops or rubs; no S3, S4	  · Gastrointestinal	Soft, non-tender, no hepatosplenomegaly, normal bowel sounds	  · Extremities	detailed exam	  · Extremities Comments	+LLE swelling / erythema / warmth, some open wounds and weeping, +fluid filled blisters	  · Neurological	detailed exam	  · Neurological Details	alert and oriented x 3	                                Labs:                                  12.4   12.31 )-----------( 271      ( 19 Apr 2019 05:25 )             37.9     19 Apr 2019 05:25    137    |  101    |  8      ----------------------------<  179    4.0     |  27     |  0.43     Ca    8.8        19 Apr 2019 05:25        PT/INR - ( 19 Apr 2019 05:25 )   PT: 24.3 sec;   INR: 2.14 ratio           CAPILLARY BLOOD GLUCOSE      POCT Blood Glucose.: 143 mg/dL (19 Apr 2019 11:12)  POCT Blood Glucose.: 212 mg/dL (19 Apr 2019 07:22)  POCT Blood Glucose.: 195 mg/dL (18 Apr 2019 21:24)  POCT Blood Glucose.: 170 mg/dL (18 Apr 2019 17:26)    CARDIAC MARKERS ( 18 Apr 2019 05:58 )  <0.015 ng/mL / x     / x     / x     / x            MEDICATIONS:    ascorbic acid 500 milliGRAM(s) Oral daily  aspirin  chewable 81 milliGRAM(s) Oral daily  atorvastatin 10 milliGRAM(s) Oral at bedtime  cefepime   IVPB 2000 milliGRAM(s) IV Intermittent every 12 hours  dextrose 5%. 1000 milliLiter(s) (50 mL/Hr) IV Continuous <Continuous>  dextrose 50% Injectable 12.5 Gram(s) IV Push once  dextrose 50% Injectable 25 Gram(s) IV Push once  dextrose 50% Injectable 25 Gram(s) IV Push once  diltiazem    Tablet 120 milliGRAM(s) Oral two times a day  gabapentin 300 milliGRAM(s) Oral daily  heparin  Injectable 5000 Unit(s) SubCutaneous every 8 hours  insulin lispro (HumaLOG) corrective regimen sliding scale   SubCutaneous three times a day before meals  metoprolol succinate  milliGRAM(s) Oral daily  montelukast 10 milliGRAM(s) Oral at bedtime  nystatin Powder 1 Application(s) Topical three times a day  potassium chloride    Tablet ER 40 milliEquivalent(s) Oral every 4 hours  vancomycin  IVPB 1000 milliGRAM(s) IV Intermittent every 12 hours  warfarin 7.5 milliGRAM(s) Oral once    MEDICATIONS  (PRN):  acetaminophen   Tablet .. 650 milliGRAM(s) Oral every 6 hours PRN Temp greater or equal to 38C (100.4F)  dextrose 40% Gel 15 Gram(s) Oral once PRN Blood Glucose LESS THAN 70 milliGRAM(s)/deciliter  docusate sodium 100 milliGRAM(s) Oral three times a day PRN Constipation  glucagon  Injectable 1 milliGRAM(s) IntraMuscular once PRN Glucose LESS THAN 70 milligrams/deciliter  HYDROmorphone  Injectable 1 milliGRAM(s) IV Push every 4 hours PRN Severe Pain (7 - 10)  HYDROmorphone  Injectable 0.5 milliGRAM(s) IV Push every 6 hours PRN Moderate Pain (4 - 6)  oxyCODONE    IR 10 milliGRAM(s) Oral every 4 hours PRN Moderate Pain (4 - 6)  oxyCODONE    IR 5 milliGRAM(s) Oral every 4 hours PRN Mild Pain (1 - 3)  zolpidem 5 milliGRAM(s) Oral at bedtime PRN Insomnia          Assessment and Plan:   Assessment:  · Assessment		  69 y/o F with PMH of HTN, dyslipidemia, Diastolic CHF, a-fib (on coumadin), chronic venous insufficiency, DM2, thyroid nodule, ulcerative colitis, p/w LLE erythema / pain    *Severe sepsis 2/2 LE cellulitis  Chr venous stasis  -Continue IV vanco and cefepime  -Blood culture: no growth  -ID follow up appreciated   -Vascular consult appreciated   -Lactic acidosis -> resolved    *Hypokalemia due to lasix  Replaced, resolved  Follow    *Diastolic CHF  Restarted po lasix due to leg edema and CHF    *DM2  -Humalog ISS  -Diabetic diet     *A-fib  - INR therapeutic today  -Continue couamdin    *HTN / dyslipidemia / ulcerative colitis / thyroid nodule   -C/w home meds and f/u outpatient for further management     *DVT ppx  -On coumadin  INR therapeutic today
Date of service: 05-01-19 @ 13:57    Has persistent Left leg pain  She is reporting new left mid thigh severe pain  New patches of rash noted on left mid thigh  Has low grade fever    ROS: denies dizziness, no HA, no SOB or cough, no abdominal pain, no diarrhea or constipation; no dysuria    MEDICATIONS  (STANDING):  ascorbic acid 500 milliGRAM(s) Oral daily  aspirin  chewable 81 milliGRAM(s) Oral daily  atorvastatin 10 milliGRAM(s) Oral at bedtime  cefTRIAXone Injectable. 2000 milliGRAM(s) IV Push every 24 hours  dextrose 5%. 1000 milliLiter(s) (50 mL/Hr) IV Continuous <Continuous>  dextrose 50% Injectable 12.5 Gram(s) IV Push once  dextrose 50% Injectable 25 Gram(s) IV Push once  dextrose 50% Injectable 25 Gram(s) IV Push once  diltiazem    Tablet 120 milliGRAM(s) Oral two times a day  docusate sodium 100 milliGRAM(s) Oral three times a day  furosemide    Tablet 40 milliGRAM(s) Oral daily  gabapentin 300 milliGRAM(s) Oral two times a day  HYDROmorphone   Tablet 4 milliGRAM(s) Oral every 6 hours  insulin lispro (HumaLOG) corrective regimen sliding scale   SubCutaneous three times a day before meals  ketorolac   Injectable 15 milliGRAM(s) IV Push once  ketorolac   Injectable 15 milliGRAM(s) IV Push every 6 hours  melatonin 5 milliGRAM(s) Oral at bedtime  metoprolol succinate  milliGRAM(s) Oral daily  montelukast 10 milliGRAM(s) Oral at bedtime  nystatin Powder 1 Application(s) Topical three times a day  senna 2 Tablet(s) Oral at bedtime  silver sulfADIAZINE 1% Cream 1 Application(s) Topical daily  vancomycin  IVPB 750 milliGRAM(s) IV Intermittent every 8 hours  warfarin 5 milliGRAM(s) Oral at bedtime      Vital Signs Last 24 Hrs  T(C): 37.3 (01 May 2019 12:12), Max: 37.7 (30 Apr 2019 17:22)  T(F): 99.1 (01 May 2019 12:12), Max: 99.8 (30 Apr 2019 17:22)  HR: 95 (01 May 2019 12:12) (95 - 114)  BP: 119/60 (01 May 2019 12:12) (119/60 - 133/67)  BP(mean): --  RR: 18 (01 May 2019 12:12) (18 - 18)  SpO2: 93% (01 May 2019 12:12) (93% - 97%)    Physical Exam:      Constitutional: frail looking  HEENT: NC/AT, EOMI, PERRLA, conjunctivae clear  Neck: supple; thyroid not palpable  Back: no tenderness  Respiratory: respiratory effort normal; clear to auscultation  Cardiovascular: S1S2 regular, no murmurs  Abdomen: soft, not tender, not distended, positive BS;   Genitourinary: no suprapubic tenderness  Musculoskeletal: no muscle tenderness, no joint swelling or tenderness  Extremities: 2+ pedal edema  LLE extensive erythema, edema, tenderness, warmth over left ankle, shin and calf; broken skin with scant discharge s/p debridement - much improved  Lower legs, skin desquamation and hypertrophy of skin at ankles  RLE chronic stasis skin changes; right thigh area noted with several patches of erythema, tenderness and mid edema; no skin break  Neurological/ Psychiatric: AxOx3, moving all extremities  Skin: no rashes; no palpable lesions    Labs: reviewed                        12.2   9.64  )-----------( 344      ( 01 May 2019 10:16 )             38.2     05-01    134<L>  |  96  |  6<L>  ----------------------------<  160<H>  3.5   |  31  |  0.59    Ca    8.2<L>      01 May 2019 10:16  Mg     1.6     05-01    Vancomycin Level, Trough: 11.6 ug/mL (04-30 @ 05:28)                          13.1   11.88 )-----------( 450      ( 25 Apr 2019 07:45 )             41.5     04-25    135  |  100  |  13  ----------------------------<  148<H>  3.7   |  32<H>  |  0.56    Ca    8.6      25 Apr 2019 07:45       Cultures:         Radiology: all available radiological tests reviewed    Advanced directives addressed: full resuscitation
67 y/o F with PMH of HTN, dyslipidemia, Diastolic CHF, a-fib (on coumadin), chronic venous insufficiency, DM2, thyroid nodule, ulcerative colitis, p/w LLE erythema / pain. Patient states when she woke up she felt fine, and then she went to work and around 10:30am she started to feel burning pain on LLE. Also noticed LE was erythematous, warm and more swollen than usual. Denies any changes in RLE. +Subjective fevers +feels cold. Denies CP, SOB, cough, runny nose, sore throat, fever, chills, nausea, vomiting, abdominal pain, dysuria, increased urinary frequency.         04/17/19; Patient seen and examined. Complaining of leg pain.   04/18/19: Patient seen and examined. Complaining of more leg pain since ace bandage.   04/19/19: Patient seen and examined. Still with pain.   04/20/19: Patient seen and examined. Pain a little better today, but still with significant amount of pain requiring IV dilaudid.       Vital Signs Last 24 Hrs  T(C): 37.6 (20 Apr 2019 12:40), Max: 37.6 (20 Apr 2019 12:40)  T(F): 99.6 (20 Apr 2019 12:40), Max: 99.6 (20 Apr 2019 12:40)  HR: 78 (20 Apr 2019 12:40) (78 - 87)  BP: 130/72 (20 Apr 2019 12:40) (116/61 - 155/70)  BP(mean): --  RR: 18 (20 Apr 2019 12:40) (18 - 19)  SpO2: 94% (20 Apr 2019 12:40) (93% - 95%)          Physical Exam:      · Constitutional	Well-developed, well nourished	  · Eyes	EOMI; PERRL; no drainage or redness	  · ENMT	No oral lesions; no gross abnormalities	  · Respiratory	Breath Sounds equal & clear to percussion & auscultation, no accessory muscle use	  · Cardiovascular	Regular rate & rhythm, normal S1, S2; no murmurs, gallops or rubs; no S3, S4	  · Gastrointestinal	Soft, non-tender, no hepatosplenomegaly, normal bowel sounds	  · Extremities	detailed exam	  · Extremities Comments	+LLE swelling / erythema / warmth, some open wounds and weeping, +fluid filled blisters	  · Neurological	detailed exam	  · Neurological Details	alert and oriented x 3	                                Labs:                                  11.9   9.16  )-----------( 340      ( 20 Apr 2019 07:40 )             36.8     20 Apr 2019 07:40    139    |  102    |  9      ----------------------------<  195    3.4     |  29     |  0.41     Ca    8.4        20 Apr 2019 07:40        PT/INR - ( 20 Apr 2019 07:40 )   PT: 31.0 sec;   INR: 2.71 ratio           CAPILLARY BLOOD GLUCOSE      POCT Blood Glucose.: 240 mg/dL (20 Apr 2019 12:31)  POCT Blood Glucose.: 177 mg/dL (20 Apr 2019 08:11)  POCT Blood Glucose.: 362 mg/dL (19 Apr 2019 21:06)  POCT Blood Glucose.: 167 mg/dL (19 Apr 2019 17:51)            MEDICATIONS:    ascorbic acid 500 milliGRAM(s) Oral daily  aspirin  chewable 81 milliGRAM(s) Oral daily  atorvastatin 10 milliGRAM(s) Oral at bedtime  cefepime   IVPB 2000 milliGRAM(s) IV Intermittent every 12 hours  dextrose 5%. 1000 milliLiter(s) (50 mL/Hr) IV Continuous <Continuous>  dextrose 50% Injectable 12.5 Gram(s) IV Push once  dextrose 50% Injectable 25 Gram(s) IV Push once  dextrose 50% Injectable 25 Gram(s) IV Push once  diltiazem    Tablet 120 milliGRAM(s) Oral two times a day  gabapentin 300 milliGRAM(s) Oral daily  heparin  Injectable 5000 Unit(s) SubCutaneous every 8 hours  insulin lispro (HumaLOG) corrective regimen sliding scale   SubCutaneous three times a day before meals  metoprolol succinate  milliGRAM(s) Oral daily  montelukast 10 milliGRAM(s) Oral at bedtime  nystatin Powder 1 Application(s) Topical three times a day  potassium chloride    Tablet ER 40 milliEquivalent(s) Oral every 4 hours  vancomycin  IVPB 1000 milliGRAM(s) IV Intermittent every 12 hours  warfarin 7.5 milliGRAM(s) Oral once    MEDICATIONS  (PRN):  acetaminophen   Tablet .. 650 milliGRAM(s) Oral every 6 hours PRN Temp greater or equal to 38C (100.4F)  dextrose 40% Gel 15 Gram(s) Oral once PRN Blood Glucose LESS THAN 70 milliGRAM(s)/deciliter  docusate sodium 100 milliGRAM(s) Oral three times a day PRN Constipation  glucagon  Injectable 1 milliGRAM(s) IntraMuscular once PRN Glucose LESS THAN 70 milligrams/deciliter  HYDROmorphone  Injectable 1 milliGRAM(s) IV Push every 4 hours PRN Severe Pain (7 - 10)  HYDROmorphone  Injectable 0.5 milliGRAM(s) IV Push every 6 hours PRN Moderate Pain (4 - 6)  oxyCODONE    IR 10 milliGRAM(s) Oral every 4 hours PRN Moderate Pain (4 - 6)  oxyCODONE    IR 5 milliGRAM(s) Oral every 4 hours PRN Mild Pain (1 - 3)  zolpidem 5 milliGRAM(s) Oral at bedtime PRN Insomnia          Assessment and Plan:   Assessment:  · Assessment		  67 y/o F with PMH of HTN, dyslipidemia, Diastolic CHF, a-fib (on coumadin), chronic venous insufficiency, DM2, thyroid nodule, ulcerative colitis, p/w LLE erythema / pain    *Severe sepsis 2/2 LE cellulitis  Chr venous stasis  -Continue IV vanco and cefepime  -Blood culture: no growth  -ID follow up appreciated   -Vascular consult appreciated   -Lactic acidosis -> resolved    *Hypokalemia due to lasix  Replace  Follow    *Diastolic CHF  Continue  lasix due to leg edema and CHF    *DM2  -Humalog ISS  -Diabetic diet     *A-fib  - INR therapeutic today  -Continue coumadin   -INR therapeutic, follow    *HTN / dyslipidemia / ulcerative colitis / thyroid nodule   -C/w home meds and f/u outpatient for further management     *DVT ppx  -On coumadin  INR therapeutic
67 y/o F with PMH of HTN, dyslipidemia, Diastolic CHF, a-fib (on coumadin), chronic venous insufficiency, DM2, thyroid nodule, ulcerative colitis, p/w LLE erythema / pain. Patient states when she woke up she felt fine, and then she went to work and around 10:30am she started to feel burning pain on LLE. Also noticed LE was erythematous, warm and more swollen than usual. Denies any changes in RLE. +Subjective fevers +feels cold. Denies CP, SOB, cough, runny nose, sore throat, fever, chills, nausea, vomiting, abdominal pain, dysuria, increased urinary frequency.         04/17/19; Patient seen and examined. Complaining of leg pain.   04/18/19: Patient seen and examined. Complaining of more leg pain since ace bandage.   04/19/19: Patient seen and examined. Still with pain.   04/20/19: Patient seen and examined. Pain a little better today, but still with significant amount of pain requiring IV dilaudid.   04/21/19: Patient seen and examined. Pain improving slowly.   04/22/19: No new complaints. Still with pain, improving.       Vital Signs Last 24 Hrs  T(C): 37 (22 Apr 2019 11:39), Max: 37 (22 Apr 2019 11:39)  T(F): 98.6 (22 Apr 2019 11:39), Max: 98.6 (22 Apr 2019 11:39)  HR: 82 (22 Apr 2019 11:39) (79 - 86)  BP: 126/70 (22 Apr 2019 11:39) (126/70 - 153/86)  BP(mean): --  RR: 18 (22 Apr 2019 11:39) (18 - 18)  SpO2: 97% (22 Apr 2019 11:39) (96% - 97%)          Physical Exam:      · Constitutional	Well-developed, well nourished	  · Eyes	EOMI; PERRL; no drainage or redness	  · ENMT	No oral lesions; no gross abnormalities	  · Respiratory	Breath Sounds equal & clear to percussion & auscultation, no accessory muscle use	  · Cardiovascular	Regular rate & rhythm, normal S1, S2; no murmurs, gallops or rubs; no S3, S4	  · Gastrointestinal	Soft, non-tender, no hepatosplenomegaly, normal bowel sounds	  · Extremities	detailed exam	  · Extremities Comments	+LLE swelling / erythema / warmth, some open wounds and weeping, +fluid filled blisters	  · Neurological	detailed exam	  · Neurological Details	alert and oriented x 3	                          Labs:                  21 Apr 2019 05:35    139    |  104    |  17     ----------------------------<  225    4.0     |  28     |  0.56     Ca    8.3        21 Apr 2019 05:35        PT/INR - ( 22 Apr 2019 07:44 )   PT: 29.3 sec;   INR: 2.56 ratio           CAPILLARY BLOOD GLUCOSE      POCT Blood Glucose.: 195 mg/dL (22 Apr 2019 12:03)  POCT Blood Glucose.: 170 mg/dL (22 Apr 2019 07:55)  POCT Blood Glucose.: 278 mg/dL (21 Apr 2019 23:18)  POCT Blood Glucose.: 185 mg/dL (21 Apr 2019 17:25)                  MEDICATIONS:        MEDICATIONS  (STANDING):  ascorbic acid 500 milliGRAM(s) Oral daily  aspirin  chewable 81 milliGRAM(s) Oral daily  atorvastatin 10 milliGRAM(s) Oral at bedtime  cefepime   IVPB 2000 milliGRAM(s) IV Intermittent every 12 hours  dextrose 5%. 1000 milliLiter(s) (50 mL/Hr) IV Continuous <Continuous>  dextrose 50% Injectable 12.5 Gram(s) IV Push once  dextrose 50% Injectable 25 Gram(s) IV Push once  dextrose 50% Injectable 25 Gram(s) IV Push once  diltiazem    Tablet 120 milliGRAM(s) Oral two times a day  furosemide    Tablet 40 milliGRAM(s) Oral daily  gabapentin 300 milliGRAM(s) Oral daily  insulin lispro (HumaLOG) corrective regimen sliding scale   SubCutaneous three times a day before meals  metoprolol succinate  milliGRAM(s) Oral daily  montelukast 10 milliGRAM(s) Oral at bedtime  nystatin Powder 1 Application(s) Topical three times a day  silver sulfADIAZINE 1% Cream 1 Application(s) Topical daily  uceris 9 milliGRAM(s) 9 mG Oral daily  vancomycin  IVPB 1000 milliGRAM(s) IV Intermittent every 8 hours  warfarin 5 milliGRAM(s) Oral at bedtime    MEDICATIONS  (PRN):  acetaminophen   Tablet .. 650 milliGRAM(s) Oral every 6 hours PRN Temp greater or equal to 38C (100.4F)  dextrose 40% Gel 15 Gram(s) Oral once PRN Blood Glucose LESS THAN 70 milliGRAM(s)/deciliter  diphenhydrAMINE 25 milliGRAM(s) Oral every 6 hours PRN Rash and/or Itching  docusate sodium 100 milliGRAM(s) Oral three times a day PRN Constipation  glucagon  Injectable 1 milliGRAM(s) IntraMuscular once PRN Glucose LESS THAN 70 milligrams/deciliter  HYDROmorphone  Injectable 2 milliGRAM(s) IV Push every 4 hours PRN Severe Pain (7 - 10)  HYDROmorphone  Injectable 0.5 milliGRAM(s) IV Push every 6 hours PRN Moderate Pain (4 - 6)  oxyCODONE    IR 5 milliGRAM(s) Oral every 4 hours PRN Mild Pain (1 - 3)  zolpidem 5 milliGRAM(s) Oral at bedtime PRN Insomnia              Assessment and Plan:   Assessment:  · Assessment		  67 y/o F with PMH of HTN, dyslipidemia, Diastolic CHF, a-fib (on coumadin), chronic venous insufficiency, DM2, thyroid nodule, ulcerative colitis, p/w LLE erythema / pain    *Severe sepsis 2/2 LE cellulitis  Chr venous stasis  -Continue IV vanco and cefepime  -Blood culture: no growth  -ID follow up appreciated   -Vascular consult appreciated   -Lactic acidosis -> resolved    *Hypokalemia due to lasix  Replaced, resolved  Follow    *Chr Diastolic CHF  Continue  lasix due to leg edema and CHF    *DM2  -Humalog ISS  -Diabetic diet     *A-fib  -Continue coumadin   -INR therapeutic, follow    *HTN / dyslipidemia / ulcerative colitis / thyroid nodule   -C/w home meds and f/u outpatient for further management     *DVT ppx  -On coumadin  INR therapeutic
67 y/o F with PMH of HTN, dyslipidemia, Diastolic CHF, a-fib (on coumadin), chronic venous insufficiency, DM2, thyroid nodule, ulcerative colitis, p/w LLE erythema / pain. Patient states when she woke up she felt fine, and then she went to work and around 10:30am she started to feel burning pain on LLE. Also noticed LE was erythematous, warm and more swollen than usual. Denies any changes in RLE. +Subjective fevers +feels cold. Denies CP, SOB, cough, runny nose, sore throat, fever, chills, nausea, vomiting, abdominal pain, dysuria, increased urinary frequency.         19; Patient seen and examined. Complaining of leg pain.     Vital Signs Last 24 Hrs  T(C): 37.1 (2019 11:58), Max: 37.6 (2019 17:08)  T(F): 98.7 (2019 11:58), Max: 99.6 (2019 17:08)  HR: 81 (2019 11:58) (81 - 110)  BP: 107/54 (2019 11:58) (107/54 - 168/77)  BP(mean): --  RR: 18 (2019 11:58) (18 - 21)  SpO2: 94% (2019 11:58) (94% - 99%)        Physical Exam:      · Constitutional	Well-developed, well nourished	  · Eyes	EOMI; PERRL; no drainage or redness	  · ENMT	No oral lesions; no gross abnormalities	  · Respiratory	Breath Sounds equal & clear to percussion & auscultation, no accessory muscle use	  · Cardiovascular	Regular rate & rhythm, normal S1, S2; no murmurs, gallops or rubs; no S3, S4	  · Gastrointestinal	Soft, non-tender, no hepatosplenomegaly, normal bowel sounds	  · Extremities	detailed exam	  · Extremities Comments	+LLE swelling / erythema / warmth, some open wounds and weeping, +fluid filled blisters	  · Neurological	detailed exam	  · Neurological Details	alert and oriented x 3	          Labs:                           12.8   15.89 )-----------( 277      ( 2019 06:01 )             39.0     2019 06:01    140    |  102    |  9      ----------------------------<  136    3.0     |  28     |  0.50     Ca    8.2        2019 06:01  Phos  3.4       2019 06:01  Mg     1.6       2019 06:01    TPro  6.3    /  Alb  2.4    /  TBili  0.9    /  DBili  x      /  AST  38     /  ALT  47     /  AlkPhos  70     2019 06:01    LIVER FUNCTIONS - ( 2019 06:01 )  Alb: 2.4 g/dL / Pro: 6.3 gm/dL / ALK PHOS: 70 U/L / ALT: 47 U/L / AST: 38 U/L / GGT: x           PT/INR - ( 2019 06:01 )   PT: 19.8 sec;   INR: 1.75 ratio         PTT - ( 2019 06:01 )  PTT:31.6 sec  CAPILLARY BLOOD GLUCOSE      POCT Blood Glucose.: 149 mg/dL (2019 11:43)  POCT Blood Glucose.: 129 mg/dL (2019 08:04)  POCT Blood Glucose.: 157 mg/dL (2019 23:58)    CARDIAC MARKERS ( 2019 21:16 )  x     / x     / 59 U/L / x     / x          Urinalysis Basic - ( 2019 20:35 )    Color: Yellow / Appearance: Clear / S.005 / pH: x  Gluc: x / Ketone: Negative  / Bili: Negative / Urobili: Negative mg/dL   Blood: x / Protein: Negative mg/dL / Nitrite: Negative   Leuk Esterase: Negative / RBC: x / WBC x   Sq Epi: x / Non Sq Epi: x / Bacteria: x          MEDICATIONS:    ascorbic acid 500 milliGRAM(s) Oral daily  aspirin  chewable 81 milliGRAM(s) Oral daily  atorvastatin 10 milliGRAM(s) Oral at bedtime  cefepime   IVPB 2000 milliGRAM(s) IV Intermittent every 12 hours  dextrose 5%. 1000 milliLiter(s) (50 mL/Hr) IV Continuous <Continuous>  dextrose 50% Injectable 12.5 Gram(s) IV Push once  dextrose 50% Injectable 25 Gram(s) IV Push once  dextrose 50% Injectable 25 Gram(s) IV Push once  diltiazem    Tablet 120 milliGRAM(s) Oral two times a day  gabapentin 300 milliGRAM(s) Oral daily  heparin  Injectable 5000 Unit(s) SubCutaneous every 8 hours  insulin lispro (HumaLOG) corrective regimen sliding scale   SubCutaneous three times a day before meals  metoprolol succinate  milliGRAM(s) Oral daily  montelukast 10 milliGRAM(s) Oral at bedtime  nystatin Powder 1 Application(s) Topical three times a day  potassium chloride    Tablet ER 40 milliEquivalent(s) Oral every 4 hours  vancomycin  IVPB 1000 milliGRAM(s) IV Intermittent every 12 hours  warfarin 7.5 milliGRAM(s) Oral once    MEDICATIONS  (PRN):  acetaminophen   Tablet .. 650 milliGRAM(s) Oral every 6 hours PRN Temp greater or equal to 38C (100.4F)  dextrose 40% Gel 15 Gram(s) Oral once PRN Blood Glucose LESS THAN 70 milliGRAM(s)/deciliter  docusate sodium 100 milliGRAM(s) Oral three times a day PRN Constipation  glucagon  Injectable 1 milliGRAM(s) IntraMuscular once PRN Glucose LESS THAN 70 milligrams/deciliter  HYDROmorphone  Injectable 1 milliGRAM(s) IV Push every 4 hours PRN Severe Pain (7 - 10)  HYDROmorphone  Injectable 0.5 milliGRAM(s) IV Push every 6 hours PRN Moderate Pain (4 - 6)  oxyCODONE    IR 10 milliGRAM(s) Oral every 4 hours PRN Moderate Pain (4 - 6)  oxyCODONE    IR 5 milliGRAM(s) Oral every 4 hours PRN Mild Pain (1 - 3)  zolpidem 5 milliGRAM(s) Oral at bedtime PRN Insomnia          Assessment and Plan:   Assessment:  · Assessment		  67 y/o F with PMH of HTN, dyslipidemia, Diastolic CHF, a-fib (on coumadin), chronic venous insufficiency, DM2, thyroid nodule, ulcerative colitis, p/w LLE erythema / pain    *Severe sepsis 2/2 LE cellulitis  Chr venous stasis  -Continue IV vanco and cefepime  -Blood culture  -ID consult apprecaited  -Vascular consult apprecaited  -S/p IVF in ED  -Lactic acidosis -> resolved    *Hypokalemia due to lasix  Replacing  Follow    *Diastolic CHF  Restart po lasix due to leg edema and CHF    *DM2  -Humalog ISS  -Diabetic diet     *A-fib  -Will increase coumadin dose to 7.5 mg tonight as patient is subtherapeutic     *HTN / dyslipidemia / ulcerative colitis / thyroid nodule   -C/w home meds and f/u outpatient for further management     *DVT ppx  -On coumadin  Started subq heparin
67 y/o F with PMH of HTN, dyslipidemia, Diastolic CHF, a-fib (on coumadin), chronic venous insufficiency, DM2, thyroid nodule, ulcerative colitis, p/w LLE erythema / pain. Patient states when she woke up she felt fine, and then she went to work and around 10:30am she started to feel burning pain on LLE. Also noticed LE was erythematous, warm and more swollen than usual. Denies any changes in RLE. +Subjective fevers +feels cold. Denies CP, SOB, cough, runny nose, sore throat, fever, chills, nausea, vomiting, abdominal pain, dysuria, increased urinary frequency.         19; Patient seen and examined. Complaining of leg pain.   19: Patient seen and examined. Complaining of more leg pain since ace bandage.     Vital Signs Last 24 Hrs  T(C): 37.8 (2019 10:57), Max: 38 (2019 05:46)  T(F): 100.1 (2019 10:57), Max: 100.4 (2019 05:46)  HR: 87 (2019 10:57) (87 - 113)  BP: 114/61 (2019 10:57) (114/61 - 156/78)  BP(mean): --  RR: 20 (2019 10:57) (18 - 20)  SpO2: 93% (2019 10:57) (92% - 99%)          Physical Exam:      · Constitutional	Well-developed, well nourished	  · Eyes	EOMI; PERRL; no drainage or redness	  · ENMT	No oral lesions; no gross abnormalities	  · Respiratory	Breath Sounds equal & clear to percussion & auscultation, no accessory muscle use	  · Cardiovascular	Regular rate & rhythm, normal S1, S2; no murmurs, gallops or rubs; no S3, S4	  · Gastrointestinal	Soft, non-tender, no hepatosplenomegaly, normal bowel sounds	  · Extremities	detailed exam	  · Extremities Comments	+LLE swelling / erythema / warmth, some open wounds and weeping, +fluid filled blisters	  · Neurological	detailed exam	  · Neurological Details	alert and oriented x 3	          Labs:                                  12.4   11.37 )-----------( 257      ( 2019 05:58 )             38.6     2019 05:58    136    |  101    |  8      ----------------------------<  140    3.5     |  28     |  0.50     Ca    8.4        2019 05:58  Phos  3.4       2019 06:01  Mg     1.6       2019 06:01    TPro  6.3    /  Alb  2.4    /  TBili  0.9    /  DBili  x      /  AST  38     /  ALT  47     /  AlkPhos  70     2019 06:01    LIVER FUNCTIONS - ( 2019 06:01 )  Alb: 2.4 g/dL / Pro: 6.3 gm/dL / ALK PHOS: 70 U/L / ALT: 47 U/L / AST: 38 U/L / GGT: x           PT/INR - ( 2019 05:58 )   PT: 20.1 sec;   INR: 1.78 ratio         PTT - ( 2019 06:01 )  PTT:31.6 sec  CAPILLARY BLOOD GLUCOSE      POCT Blood Glucose.: 177 mg/dL (2019 11:34)  POCT Blood Glucose.: 147 mg/dL (2019 08:09)  POCT Blood Glucose.: 194 mg/dL (2019 21:10)  POCT Blood Glucose.: 140 mg/dL (2019 16:59)    CARDIAC MARKERS ( 2019 05:58 )  <0.015 ng/mL / x     / x     / x     / x      CARDIAC MARKERS ( 2019 21:16 )  x     / x     / 59 U/L / x     / x          Urinalysis Basic - ( 2019 20:35 )    Color: Yellow / Appearance: Clear / S.005 / pH: x  Gluc: x / Ketone: Negative  / Bili: Negative / Urobili: Negative mg/dL   Blood: x / Protein: Negative mg/dL / Nitrite: Negative   Leuk Esterase: Negative / RBC: x / WBC x   Sq Epi: x / Non Sq Epi: x / Bacteria: x              MEDICATIONS:    ascorbic acid 500 milliGRAM(s) Oral daily  aspirin  chewable 81 milliGRAM(s) Oral daily  atorvastatin 10 milliGRAM(s) Oral at bedtime  cefepime   IVPB 2000 milliGRAM(s) IV Intermittent every 12 hours  dextrose 5%. 1000 milliLiter(s) (50 mL/Hr) IV Continuous <Continuous>  dextrose 50% Injectable 12.5 Gram(s) IV Push once  dextrose 50% Injectable 25 Gram(s) IV Push once  dextrose 50% Injectable 25 Gram(s) IV Push once  diltiazem    Tablet 120 milliGRAM(s) Oral two times a day  gabapentin 300 milliGRAM(s) Oral daily  heparin  Injectable 5000 Unit(s) SubCutaneous every 8 hours  insulin lispro (HumaLOG) corrective regimen sliding scale   SubCutaneous three times a day before meals  metoprolol succinate  milliGRAM(s) Oral daily  montelukast 10 milliGRAM(s) Oral at bedtime  nystatin Powder 1 Application(s) Topical three times a day  potassium chloride    Tablet ER 40 milliEquivalent(s) Oral every 4 hours  vancomycin  IVPB 1000 milliGRAM(s) IV Intermittent every 12 hours  warfarin 7.5 milliGRAM(s) Oral once    MEDICATIONS  (PRN):  acetaminophen   Tablet .. 650 milliGRAM(s) Oral every 6 hours PRN Temp greater or equal to 38C (100.4F)  dextrose 40% Gel 15 Gram(s) Oral once PRN Blood Glucose LESS THAN 70 milliGRAM(s)/deciliter  docusate sodium 100 milliGRAM(s) Oral three times a day PRN Constipation  glucagon  Injectable 1 milliGRAM(s) IntraMuscular once PRN Glucose LESS THAN 70 milligrams/deciliter  HYDROmorphone  Injectable 1 milliGRAM(s) IV Push every 4 hours PRN Severe Pain (7 - 10)  HYDROmorphone  Injectable 0.5 milliGRAM(s) IV Push every 6 hours PRN Moderate Pain (4 - 6)  oxyCODONE    IR 10 milliGRAM(s) Oral every 4 hours PRN Moderate Pain (4 - 6)  oxyCODONE    IR 5 milliGRAM(s) Oral every 4 hours PRN Mild Pain (1 - 3)  zolpidem 5 milliGRAM(s) Oral at bedtime PRN Insomnia          Assessment and Plan:   Assessment:  · Assessment		  67 y/o F with PMH of HTN, dyslipidemia, Diastolic CHF, a-fib (on coumadin), chronic venous insufficiency, DM2, thyroid nodule, ulcerative colitis, p/w LLE erythema / pain    *Severe sepsis 2/2 LE cellulitis  Chr venous stasis  -Continue IV vanco and cefepime  -Blood culture: no growth  -ID consult appreciated   -Vascular consult appreciated   -Lactic acidosis -> resolved    *Hypokalemia due to lasix  Replaced  Follow    *Diastolic CHF  Restarted po lasix due to leg edema and CHF    *DM2  -Humalog ISS  -Diabetic diet     *A-fib  - coumadin dose to 7.5 mg tonight as patient is subtherapeutic     *HTN / dyslipidemia / ulcerative colitis / thyroid nodule   -C/w home meds and f/u outpatient for further management     *DVT ppx  -On coumadin  Started subq heparin
67 y/o Female with h/o obesity, HTN, dyslipidemia, Diastolic CHF, A.fib (on coumadin), chronic venous insufficiency, DM type 2, thyroid nodule, ulcerative colitis was admitted for worsening LLE erythema / pain. Patient has a history of severe chronic venous insufficiency with multiple previous admissions for bilateral leg ulcers and cellulitis.  She presents with 2 days of increasing left leg swelling, weeping and pain. She denies fever or chills.  She has been applying ace bandages to both legs for compression but states that she stopped last week because of increased weeping in her legs.     Patient seen and examined at the bedside. Bedside debridement performed. no acute issues.       Well appearing, NAD  Non labored respiration  Irregular rhythm  Obese, abdomen soft, ND, NT  Left leg with chronic statis dermatitis and lipodermatosclerosis. The is significant tenderness, blanching erythema and blistering of the skin around her medial malleolus due to overlying cellulitis. Right foot with well healing venous ulcer
68 year old female with b/l LE chronic venous insufficiency admitted with worsening left leg venous ulcer and cellulitis.  Leg swelling has significantly improved.]  Continues to have significant pain.  No fever or chills    Vital Signs Last 24 Hrs  T(C): 37.8 (29 Apr 2019 11:47), Max: 37.8 (29 Apr 2019 11:47)  T(F): 100 (29 Apr 2019 11:47), Max: 100 (29 Apr 2019 11:47)  HR: 100 (29 Apr 2019 11:47) (93 - 115)  BP: 114/61 (29 Apr 2019 11:47) (114/61 - 151/59)  BP(mean): --  RR: 18 (29 Apr 2019 11:47) (18 - 20)  SpO2: 90% (29 Apr 2019 11:47) (90% - 99%)    Exam  AAO, NAD  Non labored breathing  Abd soft, NT  Left leg with significantly improved edema, no more weeping or erythema. Medial malleolar shallow venous ulcer granulating well  RLE with healed venous ulcer    	                      12.0   10.64 )-----------( 305      ( 29 Apr 2019 12:43 )             37.9
68 year old female with left leg cellulitis and infected venous ulcer  Denies fever, leukocytosis resolving  She reports that pain is slightly better    Vital Signs Last 24 Hrs  T(C): 36.3 (21 Apr 2019 05:30), Max: 37.7 (20 Apr 2019 18:41)  T(F): 97.4 (21 Apr 2019 05:30), Max: 99.8 (20 Apr 2019 18:41)  HR: 80 (21 Apr 2019 05:30) (62 - 80)  BP: 159/82 (21 Apr 2019 05:30) (130/72 - 159/82)  BP(mean): --  RR: 18 (21 Apr 2019 05:30) (18 - 18)  SpO2: 94% (21 Apr 2019 05:30) (94% - 98%)    Exam  Well appearing  NAD  Non labored breathing  left leg edema resolving, left medial leg wound clean, less tender  Both feet well perfused                                     11.9   9.16  )-----------( 340      ( 20 Apr 2019 07:40 )             36.8
68 year old female with left leg cellulitis and infected venous ulcer  Denies fever, leukocytosis resolving  She reports that pain is slightly better    Vital Signs Last 24 Hrs  T(C): 36.5 (20 Apr 2019 04:30), Max: 37.4 (19 Apr 2019 21:17)  T(F): 97.7 (20 Apr 2019 04:30), Max: 99.3 (19 Apr 2019 21:17)  HR: 82 (20 Apr 2019 04:30) (82 - 87)  BP: 155/70 (20 Apr 2019 04:30) (116/61 - 155/70)  BP(mean): --  RR: 19 (20 Apr 2019 04:30) (18 - 19)  SpO2: 95% (20 Apr 2019 04:30) (93% - 96%)    Exam  Well appearing  NAD  Non labored breathing  left leg edema resolving, left medial leg wound clean, less tender  Both feet well perfused                        11.9   9.16  )-----------( 340      ( 20 Apr 2019 07:40 )             36.8
CC: leg pain  67 y/o F with PMH of HTN, dyslipidemia, Diastolic CHF, a-fib (on coumadin), chronic venous insufficiency, DM2, thyroid nodule, ulcerative colitis, p/w LLE erythema / pain. Patient states when she woke up she felt fine, and then she went to work and around 10:30am she started to feel burning pain on LLE. Also noticed LE was erythematous, warm and more swollen than usual. Denies any changes in RLE. +Subjective fevers +feels cold. Denies CP, SOB, cough, runny nose, sore throat, fever, chills, nausea, vomiting, abdominal pain, dysuria, increased urinary frequency.   Pt with severe leg pain due to her LE infection/wound.  She is being followed by vascular.  ID on board for IV antibiotic management.  Pt with a lot of pain requiring IV dilaudid.      4/25: Pt still requiring IV pain meds. No fever, chills, n, v.  Still on IV abx.  Ambulating with PT.    REVIEW OF SYSTEMS: All other review of systems is negative unless indicated above.    Vital Signs Last 24 Hrs  T(C): 36.3 (25 Apr 2019 11:00), Max: 37.1 (24 Apr 2019 21:21)  T(F): 97.3 (25 Apr 2019 11:00), Max: 98.8 (24 Apr 2019 21:21)  HR: 77 (25 Apr 2019 11:00) (77 - 95)  BP: 115/51 (25 Apr 2019 11:00) (115/51 - 173/92)  BP(mean): --  RR: 18 (25 Apr 2019 11:00) (18 - 19)  SpO2: 96% (25 Apr 2019 11:00) (93% - 98%)    Physical Exam:      · Constitutional	Well-developed, well nourished	  · Eyes	EOMI; PERRL; no drainage or redness	  · ENMT	No oral lesions; no gross abnormalities	  · Respiratory	Breath Sounds equal & clear to percussion & auscultation, no accessory muscle use	  · Cardiovascular	Regular rate & rhythm, normal S1, S2; no murmurs, gallops or rubs; no S3, S4	  · Gastrointestinal	Soft, non-tender, no hepatosplenomegaly, normal bowel sounds	  · Extremities	detailed exam	  · Extremities Comments	both legs wrapped in bandage, feet swelling improved	  · Neurological	detailed exam	  · Neurological Details	alert and oriented x 3	             MEDICATIONS  (STANDING):  ascorbic acid 500 milliGRAM(s) Oral daily  aspirin  chewable 81 milliGRAM(s) Oral daily  atorvastatin 10 milliGRAM(s) Oral at bedtime  cefepime   IVPB 2000 milliGRAM(s) IV Intermittent every 12 hours  dextrose 5%. 1000 milliLiter(s) (50 mL/Hr) IV Continuous <Continuous>  dextrose 50% Injectable 12.5 Gram(s) IV Push once  dextrose 50% Injectable 25 Gram(s) IV Push once  dextrose 50% Injectable 25 Gram(s) IV Push once  diltiazem    Tablet 120 milliGRAM(s) Oral two times a day  docusate sodium 100 milliGRAM(s) Oral two times a day  furosemide    Tablet 40 milliGRAM(s) Oral daily  gabapentin 300 milliGRAM(s) Oral daily  insulin lispro (HumaLOG) corrective regimen sliding scale   SubCutaneous three times a day before meals  metoprolol succinate  milliGRAM(s) Oral daily  montelukast 10 milliGRAM(s) Oral at bedtime  nystatin Powder 1 Application(s) Topical three times a day  silver sulfADIAZINE 1% Cream 1 Application(s) Topical daily  vancomycin  IVPB 1000 milliGRAM(s) IV Intermittent every 8 hours  warfarin 5 milliGRAM(s) Oral at bedtime    MEDICATIONS  (PRN):  acetaminophen   Tablet .. 650 milliGRAM(s) Oral every 6 hours PRN Temp greater or equal to 38C (100.4F)  dextrose 40% Gel 15 Gram(s) Oral once PRN Blood Glucose LESS THAN 70 milliGRAM(s)/deciliter  diphenhydrAMINE 25 milliGRAM(s) Oral every 6 hours PRN Rash and/or Itching  glucagon  Injectable 1 milliGRAM(s) IntraMuscular once PRN Glucose LESS THAN 70 milligrams/deciliter  HYDROmorphone  Injectable 2 milliGRAM(s) IV Push every 4 hours PRN Severe Pain (7 - 10)                              13.1   11.88 )-----------( 450      ( 25 Apr 2019 07:45 )             41.5     04-25    135  |  100  |  13  ----------------------------<  148<H>  3.7   |  32<H>  |  0.56    Ca    8.6      25 Apr 2019 07:45      CAPILLARY BLOOD GLUCOSE      POCT Blood Glucose.: 167 mg/dL (25 Apr 2019 11:30)  POCT Blood Glucose.: 147 mg/dL (25 Apr 2019 08:10)  POCT Blood Glucose.: 246 mg/dL (24 Apr 2019 21:22)  POCT Blood Glucose.: 187 mg/dL (24 Apr 2019 17:16)      PT/INR - ( 25 Apr 2019 07:45 )   PT: 30.8 sec;   INR: 2.69 ratio                Assessment and Plan:  67 y/o F with PMH of HTN, dyslipidemia, Diastolic CHF, a-fib (on coumadin), chronic venous insufficiency, DM2, thyroid nodule, ulcerative colitis, p/w LLE erythema / pain    Severe sepsis 2/2 LE bilateral cellulitis:  -Chr venous stasis  -Continue IV vanco and cefepime, adjustment per ID  -Blood culture: no growth  -ID follow up appreciated   -Vascular consult appreciated, s/o debridement   -pain management, requiring 2mg dilaudid IV q4h   -Lactic acidosis -> resolved    *Chr Diastolic CHF  -Continue lasix     *DM2  -Humalog ISS  -Diabetic diet     *A-fib  -Continue coumadin for goal INR 2-3    *HTN / dyslipidemia / ulcerative colitis / thyroid nodule   -C/w home meds and f/u outpatient for further management     *DVT ppx  -coumadin    Dispo:  -d/c to [facility] once medically stable                                        Electronic Signatures:  Gonzalo Palacios)  (Signed 23-Apr-2019 13:06)  	Authored: Progress Note, Reason for Admission, Subjective and Objective      Last Updated: 23-Apr-2019 13:06 by Gonzalo Palacios ()
CC: leg pain  67 y/o F with PMH of HTN, dyslipidemia, Diastolic CHF, a-fib (on coumadin), chronic venous insufficiency, DM2, thyroid nodule, ulcerative colitis, p/w LLE erythema / pain. Patient states when she woke up she felt fine, and then she went to work and around 10:30am she started to feel burning pain on LLE. Also noticed LE was erythematous, warm and more swollen than usual. Denies any changes in RLE. +Subjective fevers +feels cold. Denies CP, SOB, cough, runny nose, sore throat, fever, chills, nausea, vomiting, abdominal pain, dysuria, increased urinary frequency.   Pt with severe leg pain due to her LE infection/wound.  She is being followed by vascular.  ID on board for IV antibiotic management.  Pt with a lot of pain requiring IV dilaudid.      4/25: Pt still requiring IV pain meds. No fever, chills, n, v.  Still on IV abx.  Ambulating with PT.  4/26: Status quo, still requiring IV pain meds.  Wound care continued as well as IV abx.  Wounds slowly improving.  4/27: Pt with more pain, states her leg feels like its burning, also admits to anxiety with some SOB.  No fever, chills, mild nausea.  4/28: Pt's main complaint is her ongoing leg pain due to ulceration/lymphadema with superimposed cellulitis.  I reassured her that her leg is improving but will take time.  She is mildly anxious but still requiring a lot of opioid therapy for symptomatic care.  She denies fever, chills, n, v.  States she feels a "lump" in her right side jaw but no issues with eating or dysphagia.    REVIEW OF SYSTEMS: All other review of systems is negative unless indicated above.    Vital Signs Last 24 Hrs  T(C): 37.1 (28 Apr 2019 04:40), Max: 37.1 (28 Apr 2019 04:40)  T(F): 98.8 (28 Apr 2019 04:40), Max: 98.8 (28 Apr 2019 04:40)  HR: 92 (28 Apr 2019 06:48) (92 - 101)  BP: 135/60 (28 Apr 2019 06:48) (107/64 - 146/83)  BP(mean): --  RR: 18 (28 Apr 2019 06:48) (18 - 18)  SpO2: 94% (28 Apr 2019 06:48) (92% - 95%)    Physical Exam:      · Constitutional	Well-developed, well nourished	  · Eyes	EOMI; PERRL; no drainage or redness	  · ENMT	No oral lesions; no gross abnormalities, no masses palpated or lymphadenopathy	  · Respiratory	Breath Sounds equal & clear to percussion & auscultation, no accessory muscle use	  · Cardiovascular	Regular rate & rhythm, normal S1, S2; no murmurs, gallops or rubs; no S3, S4	  · Gastrointestinal	Soft, non-tender, no hepatosplenomegaly, normal bowel sounds	  · Extremities	detailed exam	  · Extremities Comments	both legs wrapped in bandage, feet swelling improving daily	  · Neurological	detailed exam	  · Neurological Details	alert and oriented x 3	             MEDICATIONS  (STANDING):  ascorbic acid 500 milliGRAM(s) Oral daily  aspirin  chewable 81 milliGRAM(s) Oral daily  atorvastatin 10 milliGRAM(s) Oral at bedtime  cefepime   IVPB 2000 milliGRAM(s) IV Intermittent every 12 hours  dextrose 5%. 1000 milliLiter(s) (50 mL/Hr) IV Continuous <Continuous>  dextrose 50% Injectable 12.5 Gram(s) IV Push once  dextrose 50% Injectable 25 Gram(s) IV Push once  dextrose 50% Injectable 25 Gram(s) IV Push once  diltiazem    Tablet 120 milliGRAM(s) Oral two times a day  docusate sodium 100 milliGRAM(s) Oral two times a day  furosemide    Tablet 40 milliGRAM(s) Oral daily  gabapentin 300 milliGRAM(s) Oral two times a day  HYDROmorphone   Tablet 4 milliGRAM(s) Oral every 4 hours  insulin lispro (HumaLOG) corrective regimen sliding scale   SubCutaneous three times a day before meals  metoprolol succinate  milliGRAM(s) Oral daily  montelukast 10 milliGRAM(s) Oral at bedtime  nystatin Powder 1 Application(s) Topical three times a day  senna 2 Tablet(s) Oral at bedtime  silver sulfADIAZINE 1% Cream 1 Application(s) Topical daily  vancomycin  IVPB 1000 milliGRAM(s) IV Intermittent every 8 hours  warfarin 5 milliGRAM(s) Oral at bedtime    MEDICATIONS  (PRN):  acetaminophen   Tablet .. 650 milliGRAM(s) Oral every 6 hours PRN Temp greater or equal to 38C (100.4F)  ALPRAZolam 0.25 milliGRAM(s) Oral two times a day PRN anxiety  dextrose 40% Gel 15 Gram(s) Oral once PRN Blood Glucose LESS THAN 70 milliGRAM(s)/deciliter  glucagon  Injectable 1 milliGRAM(s) IntraMuscular once PRN Glucose LESS THAN 70 milligrams/deciliter  ondansetron Injectable 4 milliGRAM(s) IV Push every 6 hours PRN Nausea and/or Vomiting        CAPILLARY BLOOD GLUCOSE      POCT Blood Glucose.: 155 mg/dL (28 Apr 2019 11:19)  POCT Blood Glucose.: 152 mg/dL (28 Apr 2019 08:13)  POCT Blood Glucose.: 164 mg/dL (28 Apr 2019 00:45)  POCT Blood Glucose.: 154 mg/dL (27 Apr 2019 17:41)              Assessment and Plan:  67 y/o F with PMH of HTN, dyslipidemia, Diastolic CHF, a-fib (on coumadin), chronic venous insufficiency, DM2, thyroid nodule, ulcerative colitis, p/w LLE erythema / pain    Severe sepsis 2/2 LE bilateral cellulitis w/ chronic venous stasis:  -sepsis resolved  -cellulitis resolving  -Continue IV vanco and cefepime for 1 more day.  -Blood culture: no growth  -ID follow up appreciated   -Vascular consult appreciated, s/o debridement, wound looks clean.  Follow up outpatient.  -c/w wound care.  -pain management, transition to oral.   -Lactic acidosis -> resolved    *Chr Diastolic CHF  -Continue lasix     *DM2  -Humalog ISS  -Diabetic diet     *A-fib  -Continue coumadin for goal INR 2-3    *HTN / dyslipidemia / ulcerative colitis / thyroid nodule   -C/w home meds and f/u outpatient for further management     *DVT ppx  -coumadin    Dispo:  -d/c home Monday if medically stable.                                         Electronic Signatures:  Gonzalo Palacios)  (Signed 23-Apr-2019 13:06)  	Authored: Progress Note, Reason for Admission, Subjective and Objective      Last Updated: 23-Apr-2019 13:06 by Gonzalo Palacios ()
CC: leg pain  67 y/o F with PMH of HTN, dyslipidemia, Diastolic CHF, a-fib (on coumadin), chronic venous insufficiency, DM2, thyroid nodule, ulcerative colitis, p/w LLE erythema / pain. Patient states when she woke up she felt fine, and then she went to work and around 10:30am she started to feel burning pain on LLE. Also noticed LE was erythematous, warm and more swollen than usual. Denies any changes in RLE. +Subjective fevers +feels cold. Denies CP, SOB, cough, runny nose, sore throat, fever, chills, nausea, vomiting, abdominal pain, dysuria, increased urinary frequency.   Pt with severe leg pain due to her LE infection/wound.  She is being followed by vascular.  ID on board for IV antibiotic management.  Pt with a lot of pain requiring IV dilaudid.  She is ambulating with PT.  Her Hospital course complicated by severe pain with difficulty to treat on oral pain meds.  She has ongoing leg pain due to ulceration/lymphadema with superimposed cellulitis.  I reassured her that her leg is improving but will take time.  She is mildly anxious but still requiring a lot of opioid therapy for symptomatic care.  Hospital course further complicated by worsening erythema requiring antibiotics to be changed.      4/30: Pt still uncomfortable, with pain.  No chills, n, v.    REVIEW OF SYSTEMS: All other review of systems is negative unless indicated above.    Vital Signs Last 24 Hrs  T(C): 37.5 (30 Apr 2019 11:57), Max: 37.5 (30 Apr 2019 11:57)  T(F): 99.5 (30 Apr 2019 11:57), Max: 99.5 (30 Apr 2019 11:57)  HR: 100 (30 Apr 2019 11:57) (100 - 121)  BP: 131/62 (30 Apr 2019 11:57) (119/69 - 131/62)  BP(mean): 85 (30 Apr 2019 05:16) (85 - 85)  RR: 18 (30 Apr 2019 11:57) (18 - 19)  SpO2: 98% (30 Apr 2019 11:57) (94% - 98%)    PHYSICAL EXAM:    Constitutional: awake and alert, mild distress  HEENT: PERR, EOMI, Normal Hearing, MMM  Neck: Soft and supple  Respiratory: Breath sounds are clear bilaterally, No wheezing, rales or rhonchi  Cardiovascular: S1 and S2, regular rate and rhythm, no Murmurs, gallops or rubs  Gastrointestinal: Bowel Sounds present, large abd, soft, nontender, nondistended, no guarding, no rebound  Extremities: leg edema b/l greatly improved,  still with erythema on legs, ulcerations clean, intact  Neurological: A/O x 3, no focal deficits in my limited exam  Skin: No rashes    MEDICATIONS  (STANDING):  ascorbic acid 500 milliGRAM(s) Oral daily  aspirin  chewable 81 milliGRAM(s) Oral daily  atorvastatin 10 milliGRAM(s) Oral at bedtime  cefTRIAXone Injectable. 2000 milliGRAM(s) IV Push every 24 hours  dextrose 5%. 1000 milliLiter(s) (50 mL/Hr) IV Continuous <Continuous>  dextrose 50% Injectable 12.5 Gram(s) IV Push once  dextrose 50% Injectable 25 Gram(s) IV Push once  dextrose 50% Injectable 25 Gram(s) IV Push once  diltiazem    Tablet 120 milliGRAM(s) Oral two times a day  docusate sodium 100 milliGRAM(s) Oral three times a day  furosemide    Tablet 40 milliGRAM(s) Oral daily  gabapentin 300 milliGRAM(s) Oral two times a day  HYDROmorphone   Tablet 4 milliGRAM(s) Oral every 6 hours  insulin lispro (HumaLOG) corrective regimen sliding scale   SubCutaneous three times a day before meals  melatonin 5 milliGRAM(s) Oral at bedtime  metoprolol succinate  milliGRAM(s) Oral daily  montelukast 10 milliGRAM(s) Oral at bedtime  nystatin Powder 1 Application(s) Topical three times a day  senna 2 Tablet(s) Oral at bedtime  silver sulfADIAZINE 1% Cream 1 Application(s) Topical daily  vancomycin  IVPB 750 milliGRAM(s) IV Intermittent every 8 hours  warfarin 5 milliGRAM(s) Oral at bedtime    MEDICATIONS  (PRN):  acetaminophen   Tablet .. 650 milliGRAM(s) Oral every 6 hours PRN Temp greater or equal to 38C (100.4F)  ALPRAZolam 0.25 milliGRAM(s) Oral two times a day PRN anxiety  dextrose 40% Gel 15 Gram(s) Oral once PRN Blood Glucose LESS THAN 70 milliGRAM(s)/deciliter  glucagon  Injectable 1 milliGRAM(s) IntraMuscular once PRN Glucose LESS THAN 70 milligrams/deciliter  ondansetron Injectable 4 milliGRAM(s) IV Push every 6 hours PRN Nausea and/or Vomiting                              12.0   10.64 )-----------( 305      ( 29 Apr 2019 12:43 )             37.9     04-30    134<L>  |  96  |  6<L>  ----------------------------<  154<H>  3.1<L>   |  33<H>  |  0.48<L>    Ca    8.5      30 Apr 2019 05:28      CAPILLARY BLOOD GLUCOSE      POCT Blood Glucose.: 151 mg/dL (30 Apr 2019 11:09)  POCT Blood Glucose.: 185 mg/dL (30 Apr 2019 07:51)  POCT Blood Glucose.: 176 mg/dL (29 Apr 2019 21:14)  POCT Blood Glucose.: 165 mg/dL (29 Apr 2019 17:09)      PT/INR - ( 30 Apr 2019 05:28 )   PT: 31.2 sec;   INR: 2.73 ratio              Assessment and Plan:  67 y/o F with PMH of HTN, dyslipidemia, Diastolic CHF, a-fib (on coumadin), chronic venous insufficiency, DM2, thyroid nodule, ulcerative colitis, p/w LLE erythema / pain    Severe sepsis 2/2 LE bilateral cellulitis w/ chronic venous stasis: Slow to improve  -Lactic acidosis -> resolved  -sepsis resolved  -cellulitis resolving  -cefepime changed to ceftriaxone, c/w vanco day # 14  -Blood culture: no growth  -ID follow up appreciated   -Vascular following, s/p debridement.  -c/w wound care.  -pain management, transition to oral  -bowel regimen      *Chr Diastolic CHF  -Continue lasix   -kcl supplementation    *DM2  -Humalog ISS  -Diabetic diet     *A-fib  -Continue coumadin for goal INR 2-3    *HTN / dyslipidemia / ulcerative colitis / thyroid nodule   -C/w home meds and f/u outpatient for further management     *DVT ppx  -coumadin    Dispo:  -d/c home once medically stable.  follow up ID and vascular recs.
CC: leg pain  67 y/o F with PMH of HTN, dyslipidemia, Diastolic CHF, a-fib (on coumadin), chronic venous insufficiency, DM2, thyroid nodule, ulcerative colitis, p/w LLE erythema / pain. Patient states when she woke up she felt fine, and then she went to work and around 10:30am she started to feel burning pain on LLE. Also noticed LE was erythematous, warm and more swollen than usual. Denies any changes in RLE. +Subjective fevers +feels cold. Denies CP, SOB, cough, runny nose, sore throat, fever, chills, nausea, vomiting, abdominal pain, dysuria, increased urinary frequency.   Pt with severe leg pain due to her LE infection/wound.  She is being followed by vascular.  ID on board for IV antibiotic management.  Pt with a lot of pain requiring IV dilaudid.  She is ambulating with PT.  Her Hospital course complicated by severe pain with difficulty to treat on oral pain meds.  She has ongoing leg pain due to ulceration/lymphadema with superimposed cellulitis.  I reassured her that her leg is improving but will take time.  She is mildly anxious but still requiring a lot of opioid therapy for symptomatic care.  Hospital course further complicated by worsening erythema requiring antibiotics to be changed.      4/30: Pt still uncomfortable, with pain.  No chills, n, v.  05/01/19: Patient seen and examined. Now complaining of right thigh redness and pain. Discussed with patient and  at bed side regarding management plan. Discussed with Dr Hoyos.     REVIEW OF SYSTEMS: All other review of systems is negative unless indicated above.      Vital Signs Last 24 Hrs  T(C): 37.3 (01 May 2019 12:12), Max: 37.7 (30 Apr 2019 17:22)  T(F): 99.1 (01 May 2019 12:12), Max: 99.8 (30 Apr 2019 17:22)  HR: 95 (01 May 2019 12:12) (95 - 114)  BP: 119/60 (01 May 2019 12:12) (119/60 - 133/67)  BP(mean): --  RR: 18 (01 May 2019 12:12) (18 - 18)  SpO2: 93% (01 May 2019 12:12) (93% - 97%)        PHYSICAL EXAM:    Constitutional: awake and alert, mild distress  HEENT: PERR, EOMI, Normal Hearing, MMM  Neck: Soft and supple  Respiratory: Breath sounds are clear bilaterally, No wheezing, rales or rhonchi  Cardiovascular: S1 and S2, regular rate and rhythm, no Murmurs, gallops or rubs  Gastrointestinal: Bowel Sounds present, large abd, soft, nontender, nondistended, no guarding, no rebound  Extremities: leg edema b/l greatly improved,  still with erythema on legs, ulcerations clean, intact  Neurological: A/O x 3, no focal deficits in my limited exam          Labs:                           12.2   9.64  )-----------( 344      ( 01 May 2019 10:16 )             38.2     01 May 2019 10:16    134    |  96     |  6      ----------------------------<  160    3.5     |  31     |  0.59     Ca    8.2        01 May 2019 10:16  Mg     1.6       01 May 2019 10:16        PT/INR - ( 01 May 2019 10:16 )   PT: 38.2 sec;   INR: 3.32 ratio           CAPILLARY BLOOD GLUCOSE      POCT Blood Glucose.: 159 mg/dL (01 May 2019 11:18)  POCT Blood Glucose.: 169 mg/dL (01 May 2019 07:42)  POCT Blood Glucose.: 183 mg/dL (30 Apr 2019 21:17)  POCT Blood Glucose.: 140 mg/dL (30 Apr 2019 16:59)          MEDICATIONS  (STANDING):  ascorbic acid 500 milliGRAM(s) Oral daily  aspirin  chewable 81 milliGRAM(s) Oral daily  atorvastatin 10 milliGRAM(s) Oral at bedtime  dextrose 5%. 1000 milliLiter(s) (50 mL/Hr) IV Continuous <Continuous>  dextrose 50% Injectable 12.5 Gram(s) IV Push once  dextrose 50% Injectable 25 Gram(s) IV Push once  dextrose 50% Injectable 25 Gram(s) IV Push once  diltiazem    Tablet 120 milliGRAM(s) Oral two times a day  docusate sodium 100 milliGRAM(s) Oral three times a day  furosemide    Tablet 40 milliGRAM(s) Oral daily  gabapentin 300 milliGRAM(s) Oral two times a day  HYDROmorphone   Tablet 4 milliGRAM(s) Oral every 6 hours  insulin lispro (HumaLOG) corrective regimen sliding scale   SubCutaneous three times a day before meals  ketorolac   Injectable 15 milliGRAM(s) IV Push every 6 hours  melatonin 5 milliGRAM(s) Oral at bedtime  metoprolol succinate  milliGRAM(s) Oral daily  montelukast 10 milliGRAM(s) Oral at bedtime  nystatin Powder 1 Application(s) Topical three times a day  senna 2 Tablet(s) Oral at bedtime  silver sulfADIAZINE 1% Cream 1 Application(s) Topical daily  vancomycin  IVPB 750 milliGRAM(s) IV Intermittent every 8 hours  warfarin 5 milliGRAM(s) Oral at bedtime    MEDICATIONS  (PRN):  acetaminophen   Tablet .. 650 milliGRAM(s) Oral every 6 hours PRN Temp greater or equal to 38C (100.4F)  ALPRAZolam 0.25 milliGRAM(s) Oral two times a day PRN anxiety  dextrose 40% Gel 15 Gram(s) Oral once PRN Blood Glucose LESS THAN 70 milliGRAM(s)/deciliter  glucagon  Injectable 1 milliGRAM(s) IntraMuscular once PRN Glucose LESS THAN 70 milligrams/deciliter  HYDROmorphone  Injectable 2 milliGRAM(s) IV Push every 8 hours PRN breakthrough pain  ondansetron Injectable 4 milliGRAM(s) IV Push every 6 hours PRN Nausea and/or Vomiting                  Assessment and Plan:  67 y/o F with PMH of HTN, dyslipidemia, Diastolic CHF, a-fib (on coumadin), chronic venous insufficiency, DM2, thyroid nodule, ulcerative colitis, p/w LLE erythema / pain    Severe sepsis 2/2 LE bilateral cellulitis w/ chronic venous stasis: Slow to improve  -Lactic acidosis -> resolved  -sepsis resolved  -cefepime changed to ceftriaxone, now discontinued  -Continue IV vanco  -Consult derm  -Blood culture: no growth  -ID follow up appreciated   -Vascular following, s/p debridement.  -c/w wound care.  -pain management, transition to oral  -bowel regimen      *Chr Diastolic CHF  -Continue lasix   -kcl supplementation    *DM2  -Humalog ISS  -Diabetic diet     *A-fib  -Continue coumadin for goal INR 2-3  -Hold coumadin tonight due to high INR    *HTN / dyslipidemia / ulcerative colitis / thyroid nodule   -C/w home meds and f/u outpatient for further management     *DVT ppx  -coumadin    Dispo:  -d/c home once medically stable.  follow up ID and vascular recs.
CC: leg pain  67 y/o F with PMH of HTN, dyslipidemia, Diastolic CHF, a-fib (on coumadin), chronic venous insufficiency, DM2, thyroid nodule, ulcerative colitis, p/w LLE erythema / pain. Patient states when she woke up she felt fine, and then she went to work and around 10:30am she started to feel burning pain on LLE. Also noticed LE was erythematous, warm and more swollen than usual. Denies any changes in RLE. +Subjective fevers +feels cold. Denies CP, SOB, cough, runny nose, sore throat, fever, chills, nausea, vomiting, abdominal pain, dysuria, increased urinary frequency.   Pt with severe leg pain due to her LE infection/wound.  She is being followed by vascular.  ID on board for IV antibiotic management.  Pt with a lot of pain requiring IV dilaudid.  She is ambulating with PT.  Her Hospital course complicated by severe pain with difficulty to treat on oral pain meds.  She has ongoing leg pain due to ulceration/lymphadema with superimposed cellulitis.  I reassured her that her leg is improving but will take time.  She is mildly anxious but still requiring a lot of opioid therapy for symptomatic care.  Hospital course further complicated by worsening erythema requiring antibiotics to be changed.      4/30: Pt still uncomfortable, with pain.  No chills, n, v.  05/01/19: Patient seen and examined. Now complaining of right thigh redness and pain. Discussed with patient and  at bed side regarding management plan. Discussed with Dr Hoyos.   05/02/19: Patient seen and examined. Redness improving after starting steroid.   05/03/19: Patient seen and examined. Leg redness and pain improving.     REVIEW OF SYSTEMS: All other review of systems is negative unless indicated above.      Vital Signs Last 24 Hrs  T(C): 36.9 (03 May 2019 11:50), Max: 37.1 (02 May 2019 17:13)  T(F): 98.5 (03 May 2019 11:50), Max: 98.7 (02 May 2019 17:13)  HR: 93 (03 May 2019 11:50) (80 - 93)  BP: 124/63 (03 May 2019 11:50) (124/63 - 126/72)  BP(mean): --  RR: 18 (03 May 2019 11:50) (17 - 18)  SpO2: 95% (03 May 2019 11:50) (93% - 95%)        PHYSICAL EXAM:    Constitutional: awake and alert, mild distress  HEENT: PERR, EOMI, Normal Hearing, MMM  Neck: Soft and supple  Respiratory: Breath sounds are clear bilaterally, No wheezing, rales or rhonchi  Cardiovascular: S1 and S2, regular rate and rhythm, no Murmurs, gallops or rubs  Gastrointestinal: Bowel Sounds present, large abd, soft, nontender, nondistended, no guarding, no rebound  Extremities: leg edema b/l greatly improved,  still with erythema on legs, ulcerations clean, intact  Neurological: A/O x 3, no focal deficits in my limited exam          Labs:                                 11.1   12.16 )-----------( 376      ( 03 May 2019 07:13 )             33.8     03 May 2019 07:13    136    |  98     |  17     ----------------------------<  179    3.3     |  33     |  0.56     Ca    8.3        03 May 2019 07:13        PT/INR - ( 03 May 2019 07:13 )   PT: 27.1 sec;   INR: 2.38 ratio           CAPILLARY BLOOD GLUCOSE      POCT Blood Glucose.: 318 mg/dL (03 May 2019 12:41)  POCT Blood Glucose.: 177 mg/dL (03 May 2019 08:08)  POCT Blood Glucose.: 212 mg/dL (02 May 2019 21:49)  POCT Blood Glucose.: 266 mg/dL (02 May 2019 17:18)             MEDICATIONS:    ascorbic acid 500 milliGRAM(s) Oral daily  aspirin  chewable 81 milliGRAM(s) Oral daily  atorvastatin 10 milliGRAM(s) Oral at bedtime  dextrose 5%. 1000 milliLiter(s) (50 mL/Hr) IV Continuous <Continuous>  dextrose 50% Injectable 12.5 Gram(s) IV Push once  dextrose 50% Injectable 25 Gram(s) IV Push once  dextrose 50% Injectable 25 Gram(s) IV Push once  diltiazem    Tablet 120 milliGRAM(s) Oral two times a day  docusate sodium 100 milliGRAM(s) Oral three times a day  furosemide    Tablet 40 milliGRAM(s) Oral daily  gabapentin 300 milliGRAM(s) Oral two times a day  HYDROmorphone   Tablet 4 milliGRAM(s) Oral every 6 hours  insulin lispro (HumaLOG) corrective regimen sliding scale   SubCutaneous three times a day before meals  ketorolac   Injectable 15 milliGRAM(s) IV Push every 6 hours  melatonin 5 milliGRAM(s) Oral at bedtime  metoprolol succinate  milliGRAM(s) Oral daily  montelukast 10 milliGRAM(s) Oral at bedtime  nystatin Powder 1 Application(s) Topical three times a day  senna 2 Tablet(s) Oral at bedtime  silver sulfADIAZINE 1% Cream 1 Application(s) Topical daily  vancomycin  IVPB 750 milliGRAM(s) IV Intermittent every 8 hours  warfarin 5 milliGRAM(s) Oral at bedtime    MEDICATIONS  (PRN):  acetaminophen   Tablet .. 650 milliGRAM(s) Oral every 6 hours PRN Temp greater or equal to 38C (100.4F)  ALPRAZolam 0.25 milliGRAM(s) Oral two times a day PRN anxiety  dextrose 40% Gel 15 Gram(s) Oral once PRN Blood Glucose LESS THAN 70 milliGRAM(s)/deciliter  glucagon  Injectable 1 milliGRAM(s) IntraMuscular once PRN Glucose LESS THAN 70 milligrams/deciliter  HYDROmorphone  Injectable 2 milliGRAM(s) IV Push every 8 hours PRN breakthrough pain  ondansetron Injectable 4 milliGRAM(s) IV Push every 6 hours PRN Nausea and/or Vomiting                  Assessment and Plan:  67 y/o F with PMH of HTN, dyslipidemia, Diastolic CHF, a-fib (on coumadin), chronic venous insufficiency, DM2, thyroid nodule, ulcerative colitis, p/w LLE erythema / pain    Severe sepsis 2/2 LE bilateral cellulitis w/ chronic venous stasis  Lipodermatosclerosis as per derm  -sepsis resolved  -cefepime changed to ceftriaxone, now discontinued  -Continue IV vanco as per Dr Hoyos  -Dr Andrade consult appreciated   -Continue steroid 5 days al together  -Blood culture: no growth  -ID follow up appreciated   -Vascular following, s/p debridement.  -c/w wound care as per vascular  -pain management, transitioned to oral    *Chr Diastolic CHF  -Continue lasix   -kcl supplementation    * Hypokalemia due to lasix  replacce    *DM2  -Humalog ISS  -Diabetic diet     *A-fib  -Continue coumadin for goal INR 2-3    *HTN / dyslipidemia / ulcerative colitis / thyroid nodule   -C/w home meds and f/u outpatient for further management     *DVT ppx  -coumadin    Dispo:  -d/c home once medically stable.  follow up ID and vascular recs.
CC: leg pain  67 y/o F with PMH of HTN, dyslipidemia, Diastolic CHF, a-fib (on coumadin), chronic venous insufficiency, DM2, thyroid nodule, ulcerative colitis, p/w LLE erythema / pain. Patient states when she woke up she felt fine, and then she went to work and around 10:30am she started to feel burning pain on LLE. Also noticed LE was erythematous, warm and more swollen than usual. Denies any changes in RLE. +Subjective fevers +feels cold. Denies CP, SOB, cough, runny nose, sore throat, fever, chills, nausea, vomiting, abdominal pain, dysuria, increased urinary frequency.   Pt with severe leg pain due to her LE infection/wound.  She is being followed by vascular.  ID on board for IV antibiotic management.  Pt with a lot of pain requiring IV dilaudid.  She is ambulating with PT.  Her Hospital course complicated by severe pain with difficulty to treat on oral pain meds.  She has ongoing leg pain due to ulceration/lymphadema with superimposed cellulitis.  I reassured her that her leg is improving but will take time.  She is mildly anxious but still requiring a lot of opioid therapy for symptomatic care.  Hospital course further complicated by worsening erythema requiring antibiotics to be changed.      4/30: Pt still uncomfortable, with pain.  No chills, n, v.  05/01/19: Patient seen and examined. Now complaining of right thigh redness and pain. Discussed with patient and  at bed side regarding management plan. Discussed with Dr Hoyos.   05/02/19: Patient seen and examined. Redness improving after starting steroid.   05/03/19: Patient seen and examined. Leg redness and pain improving.   05/04/19: Patient seen and examined. No new complaints. Pain improving.     REVIEW OF SYSTEMS: All other review of systems is negative unless indicated above.      Vital Signs Last 24 Hrs  T(C): 37 (04 May 2019 11:12), Max: 37 (03 May 2019 16:58)  T(F): 98.6 (04 May 2019 11:12), Max: 98.6 (03 May 2019 16:58)  HR: 99 (04 May 2019 11:12) (71 - 99)  BP: 109/53 (04 May 2019 11:12) (109/53 - 146/71)  BP(mean): --  RR: 17 (04 May 2019 11:12) (17 - 18)  SpO2: 94% (04 May 2019 11:12) (93% - 94%)        PHYSICAL EXAM:    Constitutional: awake and alert, mild distress  HEENT: PERR, EOMI, Normal Hearing, MMM  Neck: Soft and supple  Respiratory: Breath sounds are clear bilaterally, No wheezing, rales or rhonchi  Cardiovascular: S1 and S2, regular rate and rhythm, no Murmurs, gallops or rubs  Gastrointestinal: Bowel Sounds present, large abd, soft, nontender, nondistended, no guarding, no rebound  Extremities: leg edema b/l greatly improved, erythema improving  Neurological: A/O x 3, no focal deficits           Labs:                                  11.1   12.16 )-----------( 376      ( 03 May 2019 07:13 )             33.8     03 May 2019 07:13    136    |  98     |  17     ----------------------------<  179    3.3     |  33     |  0.56     Ca    8.3        03 May 2019 07:13        PT/INR - ( 04 May 2019 07:42 )   PT: 27.4 sec;   INR: 2.40 ratio           CAPILLARY BLOOD GLUCOSE      POCT Blood Glucose.: 376 mg/dL (04 May 2019 12:30)  POCT Blood Glucose.: 161 mg/dL (04 May 2019 08:02)  POCT Blood Glucose.: 326 mg/dL (03 May 2019 16:48)                            MEDICATIONS:    ascorbic acid 500 milliGRAM(s) Oral daily  aspirin  chewable 81 milliGRAM(s) Oral daily  atorvastatin 10 milliGRAM(s) Oral at bedtime  dextrose 5%. 1000 milliLiter(s) (50 mL/Hr) IV Continuous <Continuous>  dextrose 50% Injectable 12.5 Gram(s) IV Push once  dextrose 50% Injectable 25 Gram(s) IV Push once  dextrose 50% Injectable 25 Gram(s) IV Push once  diltiazem    Tablet 120 milliGRAM(s) Oral two times a day  docusate sodium 100 milliGRAM(s) Oral three times a day  furosemide    Tablet 40 milliGRAM(s) Oral daily  gabapentin 300 milliGRAM(s) Oral two times a day  HYDROmorphone   Tablet 4 milliGRAM(s) Oral every 6 hours  insulin lispro (HumaLOG) corrective regimen sliding scale   SubCutaneous three times a day before meals  ketorolac   Injectable 15 milliGRAM(s) IV Push every 6 hours  melatonin 5 milliGRAM(s) Oral at bedtime  metoprolol succinate  milliGRAM(s) Oral daily  montelukast 10 milliGRAM(s) Oral at bedtime  nystatin Powder 1 Application(s) Topical three times a day  senna 2 Tablet(s) Oral at bedtime  silver sulfADIAZINE 1% Cream 1 Application(s) Topical daily  vancomycin  IVPB 750 milliGRAM(s) IV Intermittent every 8 hours  warfarin 5 milliGRAM(s) Oral at bedtime    MEDICATIONS  (PRN):  acetaminophen   Tablet .. 650 milliGRAM(s) Oral every 6 hours PRN Temp greater or equal to 38C (100.4F)  ALPRAZolam 0.25 milliGRAM(s) Oral two times a day PRN anxiety  dextrose 40% Gel 15 Gram(s) Oral once PRN Blood Glucose LESS THAN 70 milliGRAM(s)/deciliter  glucagon  Injectable 1 milliGRAM(s) IntraMuscular once PRN Glucose LESS THAN 70 milligrams/deciliter  HYDROmorphone  Injectable 2 milliGRAM(s) IV Push every 8 hours PRN breakthrough pain  ondansetron Injectable 4 milliGRAM(s) IV Push every 6 hours PRN Nausea and/or Vomiting                  Assessment and Plan:  67 y/o F with PMH of HTN, dyslipidemia, Diastolic CHF, a-fib (on coumadin), chronic venous insufficiency, DM2, thyroid nodule, ulcerative colitis, p/w LLE erythema / pain    Severe sepsis 2/2 LE bilateral cellulitis w/ chronic venous stasis  Lipodermatosclerosis as per derm  -sepsis resolved  -cefepime changed to ceftriaxone, now discontinued  -Continue IV vanco as per Dr Hoyos  -Dr Andrade consult appreciated   -Continue steroid 5 days al together  -Blood culture: no growth  -ID follow up appreciated   -Vascular following, s/p debridement.  -c/w wound care as per vascular  -pain management, transitioned to oral    *Chr Diastolic CHF  -Continue lasix   -kcl supplementation    * Hypokalemia due to lasix  replaced     *DM2  -Humalog ISS  -Diabetic diet     *A-fib  -Continue coumadin for goal INR 2-3    *HTN / dyslipidemia / ulcerative colitis / thyroid nodule   -C/w home meds and f/u outpatient for further management     *DVT ppx  -coumadin    Dispo:  -d/c home once medically stable.  follow up ID and vascular recs.
CC: leg pain  67 y/o F with PMH of HTN, dyslipidemia, Diastolic CHF, a-fib (on coumadin), chronic venous insufficiency, DM2, thyroid nodule, ulcerative colitis, p/w LLE erythema / pain. Patient states when she woke up she felt fine, and then she went to work and around 10:30am she started to feel burning pain on LLE. Also noticed LE was erythematous, warm and more swollen than usual. Denies any changes in RLE. +Subjective fevers +feels cold. Denies CP, SOB, cough, runny nose, sore throat, fever, chills, nausea, vomiting, abdominal pain, dysuria, increased urinary frequency.   Pt with severe leg pain due to her LE infection/wound.  She is being followed by vascular.  ID on board for IV antibiotic management.  Pt with a lot of pain requiring IV dilaudid.  She is ambulating with PT.  Her Hospital course complicated by severe pain with difficulty to treat on oral pain meds.  She has ongoing leg pain due to ulceration/lymphadema with superimposed cellulitis.  I reassured her that her leg is improving but will take time.  She is mildly anxious but still requiring a lot of opioid therapy for symptomatic care.  Hospital course further complicated by worsening erythema requiring antibiotics to be changed.      4/30: Pt still uncomfortable, with pain.  No chills, n, v.  05/01/19: Patient seen and examined. Now complaining of right thigh redness and pain. Discussed with patient and  at bed side regarding management plan. Discussed with Dr Hoyos.   05/02/19: Patient seen and examined. Redness improving after starting steroid.   05/03/19: Patient seen and examined. Leg redness and pain improving.   05/04/19: Patient seen and examined. No new complaints. Pain improving.   05/05/19: Patient seen and examined. No new complaints.     REVIEW OF SYSTEMS: All other review of systems is negative unless indicated above.      Vital Signs Last 24 Hrs  T(C): 36.9 (05 May 2019 11:46), Max: 36.9 (04 May 2019 17:37)  T(F): 98.4 (05 May 2019 11:46), Max: 98.4 (04 May 2019 17:37)  HR: 80 (05 May 2019 11:46) (72 - 88)  BP: 124/57 (05 May 2019 11:46) (110/63 - 132/70)  BP(mean): --  RR: 17 (05 May 2019 11:46) (17 - 18)  SpO2: 94% (05 May 2019 11:46) (94% - 96%)        PHYSICAL EXAM:    Constitutional: awake and alert, mild distress  HEENT: PERR, EOMI, Normal Hearing, MMM  Neck: Soft and supple  Respiratory: Breath sounds are clear bilaterally, No wheezing, rales or rhonchi  Cardiovascular: S1 and S2, regular rate and rhythm, no Murmurs, gallops or rubs  Gastrointestinal: Bowel Sounds present, large abd, soft, nontender, nondistended, no guarding, no rebound  Extremities: leg edema b/l greatly improved, erythema improving  Neurological: A/O x 3, no focal deficits           Labs:           05 May 2019 07:44    136    |  102    |  21     ----------------------------<  166    3.7     |  31     |  0.62     Ca    8.7        05 May 2019 07:44        PT/INR - ( 05 May 2019 07:44 )   PT: 31.1 sec;   INR: 2.72 ratio           CAPILLARY BLOOD GLUCOSE      POCT Blood Glucose.: 360 mg/dL (05 May 2019 12:31)  POCT Blood Glucose.: 213 mg/dL (05 May 2019 08:52)  POCT Blood Glucose.: 331 mg/dL (04 May 2019 17:12)              MEDICATIONS:    ascorbic acid 500 milliGRAM(s) Oral daily  aspirin  chewable 81 milliGRAM(s) Oral daily  atorvastatin 10 milliGRAM(s) Oral at bedtime  dextrose 5%. 1000 milliLiter(s) (50 mL/Hr) IV Continuous <Continuous>  dextrose 50% Injectable 12.5 Gram(s) IV Push once  dextrose 50% Injectable 25 Gram(s) IV Push once  dextrose 50% Injectable 25 Gram(s) IV Push once  diltiazem    Tablet 120 milliGRAM(s) Oral two times a day  docusate sodium 100 milliGRAM(s) Oral three times a day  furosemide    Tablet 40 milliGRAM(s) Oral daily  gabapentin 300 milliGRAM(s) Oral two times a day  HYDROmorphone   Tablet 4 milliGRAM(s) Oral every 6 hours  insulin lispro (HumaLOG) corrective regimen sliding scale   SubCutaneous three times a day before meals  ketorolac   Injectable 15 milliGRAM(s) IV Push every 6 hours  melatonin 5 milliGRAM(s) Oral at bedtime  metoprolol succinate  milliGRAM(s) Oral daily  montelukast 10 milliGRAM(s) Oral at bedtime  nystatin Powder 1 Application(s) Topical three times a day  senna 2 Tablet(s) Oral at bedtime  silver sulfADIAZINE 1% Cream 1 Application(s) Topical daily  vancomycin  IVPB 750 milliGRAM(s) IV Intermittent every 8 hours  warfarin 5 milliGRAM(s) Oral at bedtime    MEDICATIONS  (PRN):  acetaminophen   Tablet .. 650 milliGRAM(s) Oral every 6 hours PRN Temp greater or equal to 38C (100.4F)  ALPRAZolam 0.25 milliGRAM(s) Oral two times a day PRN anxiety  dextrose 40% Gel 15 Gram(s) Oral once PRN Blood Glucose LESS THAN 70 milliGRAM(s)/deciliter  glucagon  Injectable 1 milliGRAM(s) IntraMuscular once PRN Glucose LESS THAN 70 milligrams/deciliter  HYDROmorphone  Injectable 2 milliGRAM(s) IV Push every 8 hours PRN breakthrough pain  ondansetron Injectable 4 milliGRAM(s) IV Push every 6 hours PRN Nausea and/or Vomiting                  Assessment and Plan:  67 y/o F with PMH of HTN, dyslipidemia, Diastolic CHF, a-fib (on coumadin), chronic venous insufficiency, DM2, thyroid nodule, ulcerative colitis, p/w LLE erythema / pain    Severe sepsis 2/2 LE bilateral cellulitis w/ chronic venous stasis  Lipodermatosclerosis as per derm  -sepsis resolved  -cefepime changed to ceftriaxone, now discontinued  -S/P IV vanco, now on ceftin  -Dr Andrade consult appreciated   -Continue steroid 5 days al together  -Blood culture: no growth  -ID follow up appreciated   -Vascular following, s/p debridement.  -c/w wound care as per vascular  -pain management, transitioned to oral    *Chr Diastolic CHF  -Continue lasix   -kcl supplementation    * Hypokalemia due to lasix  replaced     *DM2  -Humalog ISS  -Diabetic diet     *A-fib  -Continue coumadin for goal INR 2-3    *HTN / dyslipidemia / ulcerative colitis / thyroid nodule   -C/w home meds and f/u outpatient for further management     *DVT ppx  -coumadin      Possible d/c in 1 or 2 days time.  SW for d/c planning     Dispo:  -d/c home once medically stable.  follow up ID and vascular recs.
CC: leg pain  69 y/o F with PMH of HTN, dyslipidemia, Diastolic CHF, a-fib (on coumadin), chronic venous insufficiency, DM2, thyroid nodule, ulcerative colitis, p/w LLE erythema / pain. Patient states when she woke up she felt fine, and then she went to work and around 10:30am she started to feel burning pain on LLE. Also noticed LE was erythematous, warm and more swollen than usual. Denies any changes in RLE. +Subjective fevers +feels cold. Denies CP, SOB, cough, runny nose, sore throat, fever, chills, nausea, vomiting, abdominal pain, dysuria, increased urinary frequency.       04/17/19; Patient seen and examined. Complaining of leg pain.   04/18/19: Patient seen and examined. Complaining of more leg pain since ace bandage.   04/19/19: Patient seen and examined. Still with pain.   04/20/19: Patient seen and examined. Pain a little better today, but still with significant amount of pain requiring IV dilaudid.   04/21/19: Patient seen and examined. Pain improving slowly.   04/22/19: No new complaints. Still with pain, improving.   04/22: Pt requiring more pain meds to lower extremity, left worse than right.  No fever, chills, tolerating abx.    REVIEW OF SYSTEMS: All other review of systems is negative unless indicated above.    Vital Signs Last 24 Hrs  T(C): 36.9 (23 Apr 2019 05:25), Max: 36.9 (23 Apr 2019 05:25)  T(F): 98.4 (23 Apr 2019 05:25), Max: 98.4 (23 Apr 2019 05:25)  HR: 74 (23 Apr 2019 05:25) (68 - 74)  BP: 155/93 (23 Apr 2019 05:25) (130/51 - 155/93)  BP(mean): --  RR: 18 (23 Apr 2019 05:25) (18 - 18)  SpO2: 97% (23 Apr 2019 05:25) (97% - 97%)          Physical Exam:      · Constitutional	Well-developed, well nourished	  · Eyes	EOMI; PERRL; no drainage or redness	  · ENMT	No oral lesions; no gross abnormalities	  · Respiratory	Breath Sounds equal & clear to percussion & auscultation, no accessory muscle use	  · Cardiovascular	Regular rate & rhythm, normal S1, S2; no murmurs, gallops or rubs; no S3, S4	  · Gastrointestinal	Soft, non-tender, no hepatosplenomegaly, normal bowel sounds	  · Extremities	detailed exam	  · Extremities Comments	both legs wrapped in bandage, feet swelling improved	  · Neurological	detailed exam	  · Neurological Details	alert and oriented x 3	             MEDICATIONS  (STANDING):  ascorbic acid 500 milliGRAM(s) Oral daily  aspirin  chewable 81 milliGRAM(s) Oral daily  atorvastatin 10 milliGRAM(s) Oral at bedtime  cefepime   IVPB 2000 milliGRAM(s) IV Intermittent every 12 hours  dextrose 5%. 1000 milliLiter(s) (50 mL/Hr) IV Continuous <Continuous>  dextrose 50% Injectable 12.5 Gram(s) IV Push once  dextrose 50% Injectable 25 Gram(s) IV Push once  dextrose 50% Injectable 25 Gram(s) IV Push once  diltiazem    Tablet 120 milliGRAM(s) Oral two times a day  furosemide    Tablet 40 milliGRAM(s) Oral daily  gabapentin 300 milliGRAM(s) Oral daily  insulin lispro (HumaLOG) corrective regimen sliding scale   SubCutaneous three times a day before meals  metoprolol succinate  milliGRAM(s) Oral daily  montelukast 10 milliGRAM(s) Oral at bedtime  nystatin Powder 1 Application(s) Topical three times a day  silver sulfADIAZINE 1% Cream 1 Application(s) Topical daily  uceris 9 milliGRAM(s) 9 mG Oral daily  vancomycin  IVPB 1000 milliGRAM(s) IV Intermittent every 8 hours  warfarin 5 milliGRAM(s) Oral at bedtime    MEDICATIONS  (PRN):  acetaminophen   Tablet .. 650 milliGRAM(s) Oral every 6 hours PRN Temp greater or equal to 38C (100.4F)  dextrose 40% Gel 15 Gram(s) Oral once PRN Blood Glucose LESS THAN 70 milliGRAM(s)/deciliter  diphenhydrAMINE 25 milliGRAM(s) Oral every 6 hours PRN Rash and/or Itching  docusate sodium 100 milliGRAM(s) Oral three times a day PRN Constipation  glucagon  Injectable 1 milliGRAM(s) IntraMuscular once PRN Glucose LESS THAN 70 milligrams/deciliter  HYDROmorphone  Injectable 2 milliGRAM(s) IV Push every 4 hours PRN Severe Pain (7 - 10)  HYDROmorphone  Injectable 0.5 milliGRAM(s) IV Push every 6 hours PRN Moderate Pain (4 - 6)  oxyCODONE    IR 5 milliGRAM(s) Oral every 4 hours PRN Mild Pain (1 - 3)  zolpidem 5 milliGRAM(s) Oral at bedtime PRN Insomnia          04-23    138  |  101  |  16  ----------------------------<  152<H>  3.8   |  30  |  0.57    Ca    8.4<L>      23 Apr 2019 07:09      CAPILLARY BLOOD GLUCOSE      POCT Blood Glucose.: 290 mg/dL (23 Apr 2019 11:22)  POCT Blood Glucose.: 169 mg/dL (23 Apr 2019 07:38)  POCT Blood Glucose.: 247 mg/dL (22 Apr 2019 21:42)  POCT Blood Glucose.: 144 mg/dL (22 Apr 2019 17:19)      PT/INR - ( 23 Apr 2019 07:09 )   PT: 27.1 sec;   INR: 2.38 ratio              Assessment and Plan:  69 y/o F with PMH of HTN, dyslipidemia, Diastolic CHF, a-fib (on coumadin), chronic venous insufficiency, DM2, thyroid nodule, ulcerative colitis, p/w LLE erythema / pain    Severe sepsis 2/2 LE bilateral cellulitis:  -Chr venous stasis  -Continue IV vanco and cefepime, adjustment per ID  -Blood culture: no growth  -ID follow up appreciated   -Vascular consult appreciated   -pain management, requiring 2mg dilaudid IV q4h   -Lactic acidosis -> resolved    *Chr Diastolic CHF  -Continue lasix     *DM2  -Humalog ISS  -Diabetic diet     *A-fib  -Continue coumadin for goal INR 2-3    *HTN / dyslipidemia / ulcerative colitis / thyroid nodule   -C/w home meds and f/u outpatient for further management     *DVT ppx  -coumadin    Dispo:  -d/c to [facility] once medically stable
CC: leg pain  69 y/o F with PMH of HTN, dyslipidemia, Diastolic CHF, a-fib (on coumadin), chronic venous insufficiency, DM2, thyroid nodule, ulcerative colitis, p/w LLE erythema / pain. Patient states when she woke up she felt fine, and then she went to work and around 10:30am she started to feel burning pain on LLE. Also noticed LE was erythematous, warm and more swollen than usual. Denies any changes in RLE. +Subjective fevers +feels cold. Denies CP, SOB, cough, runny nose, sore throat, fever, chills, nausea, vomiting, abdominal pain, dysuria, increased urinary frequency.   Pt with severe leg pain due to her LE infection/wound.  She is being followed by vascular.  ID on board for IV antibiotic management.  Pt with a lot of pain requiring IV dilaudid.      4/25: Pt still requiring IV pain meds. No fever, chills, n, v.  Still on IV abx.  Ambulating with PT.  4/26: Status quo, still requiring IV pain meds.  Wound care continued as well as IV abx.  Wounds slowly improving.    REVIEW OF SYSTEMS: All other review of systems is negative unless indicated above.    Vital Signs Last 24 Hrs  T(C): 37 (26 Apr 2019 11:48), Max: 37 (26 Apr 2019 11:48)  T(F): 98.6 (26 Apr 2019 11:48), Max: 98.6 (26 Apr 2019 11:48)  HR: 71 (26 Apr 2019 11:48) (67 - 78)  BP: 108/73 (26 Apr 2019 11:48) (103/64 - 138/57)  BP(mean): --  RR: 18 (26 Apr 2019 11:48) (18 - 18)  SpO2: 95% (26 Apr 2019 11:48) (93% - 100%)    Physical Exam:      · Constitutional	Well-developed, well nourished	  · Eyes	EOMI; PERRL; no drainage or redness	  · ENMT	No oral lesions; no gross abnormalities	  · Respiratory	Breath Sounds equal & clear to percussion & auscultation, no accessory muscle use	  · Cardiovascular	Regular rate & rhythm, normal S1, S2; no murmurs, gallops or rubs; no S3, S4	  · Gastrointestinal	Soft, non-tender, no hepatosplenomegaly, normal bowel sounds	  · Extremities	detailed exam	  · Extremities Comments	both legs wrapped in bandage, feet swelling improved	  · Neurological	detailed exam	  · Neurological Details	alert and oriented x 3	             MEDICATIONS  (STANDING):  ascorbic acid 500 milliGRAM(s) Oral daily  aspirin  chewable 81 milliGRAM(s) Oral daily  atorvastatin 10 milliGRAM(s) Oral at bedtime  cefepime   IVPB 2000 milliGRAM(s) IV Intermittent every 12 hours  dextrose 5%. 1000 milliLiter(s) (50 mL/Hr) IV Continuous <Continuous>  dextrose 50% Injectable 12.5 Gram(s) IV Push once  dextrose 50% Injectable 25 Gram(s) IV Push once  dextrose 50% Injectable 25 Gram(s) IV Push once  diltiazem    Tablet 120 milliGRAM(s) Oral two times a day  docusate sodium 100 milliGRAM(s) Oral two times a day  furosemide    Tablet 40 milliGRAM(s) Oral daily  gabapentin 300 milliGRAM(s) Oral daily  insulin lispro (HumaLOG) corrective regimen sliding scale   SubCutaneous three times a day before meals  metoprolol succinate  milliGRAM(s) Oral daily  montelukast 10 milliGRAM(s) Oral at bedtime  nystatin Powder 1 Application(s) Topical three times a day  silver sulfADIAZINE 1% Cream 1 Application(s) Topical daily  vancomycin  IVPB 1000 milliGRAM(s) IV Intermittent every 8 hours  warfarin 5 milliGRAM(s) Oral at bedtime    MEDICATIONS  (PRN):  acetaminophen   Tablet .. 650 milliGRAM(s) Oral every 6 hours PRN Temp greater or equal to 38C (100.4F)  dextrose 40% Gel 15 Gram(s) Oral once PRN Blood Glucose LESS THAN 70 milliGRAM(s)/deciliter  diphenhydrAMINE 25 milliGRAM(s) Oral every 6 hours PRN Rash and/or Itching  glucagon  Injectable 1 milliGRAM(s) IntraMuscular once PRN Glucose LESS THAN 70 milligrams/deciliter  HYDROmorphone  Injectable 2 milliGRAM(s) IV Push every 8 hours PRN breakthrough pain  oxyCODONE    IR 10 milliGRAM(s) Oral every 4 hours PRN Severe Pain (7 - 10)                              13.1   11.88 )-----------( 450      ( 25 Apr 2019 07:45 )             41.5     04-25    135  |  100  |  13  ----------------------------<  148<H>  3.7   |  32<H>  |  0.56    Ca    8.6      25 Apr 2019 07:45      CAPILLARY BLOOD GLUCOSE      POCT Blood Glucose.: 455 mg/dL (26 Apr 2019 12:41)  POCT Blood Glucose.: 167 mg/dL (26 Apr 2019 07:48)  POCT Blood Glucose.: 169 mg/dL (25 Apr 2019 21:02)  POCT Blood Glucose.: 178 mg/dL (25 Apr 2019 17:48)      PT/INR - ( 26 Apr 2019 06:55 )   PT: 29.4 sec;   INR: 2.57 ratio                  Assessment and Plan:  69 y/o F with PMH of HTN, dyslipidemia, Diastolic CHF, a-fib (on coumadin), chronic venous insufficiency, DM2, thyroid nodule, ulcerative colitis, p/w LLE erythema / pain    Severe sepsis 2/2 LE bilateral cellulitis:  -sepsis resolved  -Chr venous stasis  -Continue IV vanco and cefepime, adjustment per ID  -Blood culture: no growth  -ID follow up appreciated   -Vascular consult appreciated, s/o debridement   -pain management  -Lactic acidosis -> resolved    *Chr Diastolic CHF  -Continue lasix     *DM2  -Humalog ISS  -Diabetic diet     *A-fib  -Continue coumadin for goal INR 2-3    *HTN / dyslipidemia / ulcerative colitis / thyroid nodule   -C/w home meds and f/u outpatient for further management     *DVT ppx  -coumadin    Dispo:  -d/c home Monday                                        Electronic Signatures:  Gonzalo Palacios ()  (Signed 23-Apr-2019 13:06)  	Authored: Progress Note, Reason for Admission, Subjective and Objective      Last Updated: 23-Apr-2019 13:06 by Gonzalo Palacios ()
CC: leg pain  69 y/o F with PMH of HTN, dyslipidemia, Diastolic CHF, a-fib (on coumadin), chronic venous insufficiency, DM2, thyroid nodule, ulcerative colitis, p/w LLE erythema / pain. Patient states when she woke up she felt fine, and then she went to work and around 10:30am she started to feel burning pain on LLE. Also noticed LE was erythematous, warm and more swollen than usual. Denies any changes in RLE. +Subjective fevers +feels cold. Denies CP, SOB, cough, runny nose, sore throat, fever, chills, nausea, vomiting, abdominal pain, dysuria, increased urinary frequency.   Pt with severe leg pain due to her LE infection/wound.  She is being followed by vascular.  ID on board for IV antibiotic management.  Pt with a lot of pain requiring IV dilaudid.      4/25: Pt still requiring IV pain meds. No fever, chills, n, v.  Still on IV abx.  Ambulating with PT.  4/26: Status quo, still requiring IV pain meds.  Wound care continued as well as IV abx.  Wounds slowly improving.  4/27: Pt with more pain, states her leg feels like its burning, also admits to anxiety with some SOB.  No fever, chills, mild nausea.    REVIEW OF SYSTEMS: All other review of systems is negative unless indicated above.    Vital Signs Last 24 Hrs  T(C): 36.7 (27 Apr 2019 05:08), Max: 37.1 (26 Apr 2019 20:52)  T(F): 98 (27 Apr 2019 05:08), Max: 98.7 (26 Apr 2019 20:52)  HR: 89 (27 Apr 2019 05:08) (71 - 98)  BP: 139/66 (27 Apr 2019 05:08) (108/73 - 139/66)  BP(mean): --  RR: 18 (27 Apr 2019 05:08) (1 - 18)  SpO2: 92% (27 Apr 2019 05:08) (92% - 95%)    Physical Exam:      · Constitutional	Well-developed, well nourished	  · Eyes	EOMI; PERRL; no drainage or redness	  · ENMT	No oral lesions; no gross abnormalities	  · Respiratory	Breath Sounds equal & clear to percussion & auscultation, no accessory muscle use	  · Cardiovascular	Regular rate & rhythm, normal S1, S2; no murmurs, gallops or rubs; no S3, S4	  · Gastrointestinal	Soft, non-tender, no hepatosplenomegaly, normal bowel sounds	  · Extremities	detailed exam	  · Extremities Comments	both legs wrapped in bandage, feet swelling improved	  · Neurological	detailed exam	  · Neurological Details	alert and oriented x 3	             MEDICATIONS  (STANDING):  ascorbic acid 500 milliGRAM(s) Oral daily  aspirin  chewable 81 milliGRAM(s) Oral daily  atorvastatin 10 milliGRAM(s) Oral at bedtime  cefepime   IVPB 2000 milliGRAM(s) IV Intermittent every 12 hours  dextrose 5%. 1000 milliLiter(s) (50 mL/Hr) IV Continuous <Continuous>  dextrose 50% Injectable 12.5 Gram(s) IV Push once  dextrose 50% Injectable 25 Gram(s) IV Push once  dextrose 50% Injectable 25 Gram(s) IV Push once  diltiazem    Tablet 120 milliGRAM(s) Oral two times a day  docusate sodium 100 milliGRAM(s) Oral two times a day  furosemide    Tablet 40 milliGRAM(s) Oral daily  gabapentin 300 milliGRAM(s) Oral two times a day  insulin lispro (HumaLOG) corrective regimen sliding scale   SubCutaneous three times a day before meals  metoprolol succinate  milliGRAM(s) Oral daily  montelukast 10 milliGRAM(s) Oral at bedtime  nystatin Powder 1 Application(s) Topical three times a day  silver sulfADIAZINE 1% Cream 1 Application(s) Topical daily  vancomycin  IVPB 1000 milliGRAM(s) IV Intermittent every 8 hours  warfarin 5 milliGRAM(s) Oral at bedtime    MEDICATIONS  (PRN):  acetaminophen   Tablet .. 650 milliGRAM(s) Oral every 6 hours PRN Temp greater or equal to 38C (100.4F)  ALPRAZolam 0.25 milliGRAM(s) Oral two times a day PRN anxiety  dextrose 40% Gel 15 Gram(s) Oral once PRN Blood Glucose LESS THAN 70 milliGRAM(s)/deciliter  diphenhydrAMINE 25 milliGRAM(s) Oral every 6 hours PRN Rash and/or Itching  glucagon  Injectable 1 milliGRAM(s) IntraMuscular once PRN Glucose LESS THAN 70 milligrams/deciliter  HYDROmorphone  Injectable 2 milliGRAM(s) IV Push every 8 hours PRN breakthrough pain  ondansetron Injectable 4 milliGRAM(s) IV Push every 6 hours PRN Nausea and/or Vomiting  oxyCODONE    IR 15 milliGRAM(s) Oral every 4 hours PRN Severe Pain (7 - 10)      CAPILLARY BLOOD GLUCOSE      POCT Blood Glucose.: 179 mg/dL (27 Apr 2019 08:43)  POCT Blood Glucose.: 205 mg/dL (26 Apr 2019 21:00)  POCT Blood Glucose.: 170 mg/dL (26 Apr 2019 17:17)  POCT Blood Glucose.: 455 mg/dL (26 Apr 2019 12:41)      PT/INR - ( 26 Apr 2019 06:55 )   PT: 29.4 sec;   INR: 2.57 ratio              Assessment and Plan:  69 y/o F with PMH of HTN, dyslipidemia, Diastolic CHF, a-fib (on coumadin), chronic venous insufficiency, DM2, thyroid nodule, ulcerative colitis, p/w LLE erythema / pain    Severe sepsis 2/2 LE bilateral cellulitis:  -sepsis resolved  -Chr venous stasis  -Continue IV vanco and cefepime, adjustment per ID  -Blood culture: no growth  -ID follow up appreciated   -Vascular consult appreciated, s/o debridement   -pain management  -Lactic acidosis -> resolved    *Chr Diastolic CHF  -Continue lasix     *DM2  -Humalog ISS  -Diabetic diet     *A-fib  -Continue coumadin for goal INR 2-3    *HTN / dyslipidemia / ulcerative colitis / thyroid nodule   -C/w home meds and f/u outpatient for further management     *DVT ppx  -coumadin    Dispo:  -d/c home Monday                                        Electronic Signatures:  Gonzalo Palacios)  (Signed 23-Apr-2019 13:06)  	Authored: Progress Note, Reason for Admission, Subjective and Objective      Last Updated: 23-Apr-2019 13:06 by Gonzalo Palacios ()
CC: leg pain  69 y/o F with PMH of HTN, dyslipidemia, Diastolic CHF, a-fib (on coumadin), chronic venous insufficiency, DM2, thyroid nodule, ulcerative colitis, p/w LLE erythema / pain. Patient states when she woke up she felt fine, and then she went to work and around 10:30am she started to feel burning pain on LLE. Also noticed LE was erythematous, warm and more swollen than usual. Denies any changes in RLE. +Subjective fevers +feels cold. Denies CP, SOB, cough, runny nose, sore throat, fever, chills, nausea, vomiting, abdominal pain, dysuria, increased urinary frequency.   Pt with severe leg pain due to her LE infection/wound.  She is being followed by vascular.  ID on board for IV antibiotic management.  Pt with a lot of pain requiring IV dilaudid.      4/25: Pt still requiring IV pain meds. No fever, chills, n, v.  Still on IV abx.  Ambulating with PT.  4/26: Status quo, still requiring IV pain meds.  Wound care continued as well as IV abx.  Wounds slowly improving.  4/27: Pt with more pain, states her leg feels like its burning, also admits to anxiety with some SOB.  No fever, chills, mild nausea.  4/28: Pt's main complaint is her ongoing leg pain due to ulceration/lymphadema with superimposed cellulitis.  I reassured her that her leg is improving but will take time.  She is mildly anxious but still requiring a lot of opioid therapy for symptomatic care.  She denies fever, chills, n, v.  States she feels a "lump" in her right side jaw but no issues with eating or dysphagia.    4/29: Pt crying, she is anxious and complains of right leg pain.  No fever, chills, n, v.    REVIEW OF SYSTEMS: All other review of systems is negative unless indicated above.    Vital Signs Last 24 Hrs  T(C): 37.8 (29 Apr 2019 11:47), Max: 37.8 (29 Apr 2019 11:47)  T(F): 100 (29 Apr 2019 11:47), Max: 100 (29 Apr 2019 11:47)  HR: 100 (29 Apr 2019 11:47) (93 - 115)  BP: 114/61 (29 Apr 2019 11:47) (114/61 - 151/59)  BP(mean): --  RR: 18 (29 Apr 2019 11:47) (18 - 20)  SpO2: 90% (29 Apr 2019 11:47) (90% - 99%)    Physical Exam:      · Constitutional	Well-developed, well nourished	  · Eyes	EOMI; PERRL; no drainage or redness	  · ENMT	No oral lesions; no gross abnormalities, no masses palpated or lymphadenopathy	  · Respiratory	Breath Sounds equal & clear to percussion & auscultation, no accessory muscle use	  · Cardiovascular	Regular rate & rhythm, normal S1, S2; no murmurs, gallops or rubs; no S3, S4	  · Gastrointestinal	Soft, non-tender, no hepatosplenomegaly, normal bowel sounds	  · Extremities	detailed exam	  · Extremities Comments	both legs wrapped in bandage, feet swelling improving daily. right leg erythema, ulcer clean	  · Neurological	detailed exam	  · Neurological Details	alert and oriented x 3	             MEDICATIONS  (STANDING):  ALPRAZolam 0.25 milliGRAM(s) Oral three times a day  ascorbic acid 500 milliGRAM(s) Oral daily  aspirin  chewable 81 milliGRAM(s) Oral daily  atorvastatin 10 milliGRAM(s) Oral at bedtime  cefTRIAXone Injectable. 2000 milliGRAM(s) IV Push every 24 hours  dextrose 5%. 1000 milliLiter(s) (50 mL/Hr) IV Continuous <Continuous>  dextrose 50% Injectable 12.5 Gram(s) IV Push once  dextrose 50% Injectable 25 Gram(s) IV Push once  dextrose 50% Injectable 25 Gram(s) IV Push once  diltiazem    Tablet 120 milliGRAM(s) Oral two times a day  docusate sodium 100 milliGRAM(s) Oral two times a day  furosemide    Tablet 40 milliGRAM(s) Oral daily  gabapentin 300 milliGRAM(s) Oral two times a day  HYDROmorphone   Tablet 4 milliGRAM(s) Oral every 6 hours  insulin lispro (HumaLOG) corrective regimen sliding scale   SubCutaneous three times a day before meals  melatonin 5 milliGRAM(s) Oral at bedtime  metoprolol succinate  milliGRAM(s) Oral daily  montelukast 10 milliGRAM(s) Oral at bedtime  nystatin Powder 1 Application(s) Topical three times a day  potassium chloride    Tablet ER 20 milliEquivalent(s) Oral once  senna 2 Tablet(s) Oral at bedtime  silver sulfADIAZINE 1% Cream 1 Application(s) Topical daily  vancomycin  IVPB 750 milliGRAM(s) IV Intermittent every 8 hours  warfarin 5 milliGRAM(s) Oral at bedtime    MEDICATIONS  (PRN):  acetaminophen   Tablet .. 650 milliGRAM(s) Oral every 6 hours PRN Temp greater or equal to 38C (100.4F)  dextrose 40% Gel 15 Gram(s) Oral once PRN Blood Glucose LESS THAN 70 milliGRAM(s)/deciliter  glucagon  Injectable 1 milliGRAM(s) IntraMuscular once PRN Glucose LESS THAN 70 milligrams/deciliter  ondansetron Injectable 4 milliGRAM(s) IV Push every 6 hours PRN Nausea and/or Vomiting                              12.0   10.64 )-----------( 305      ( 29 Apr 2019 12:43 )             37.9     04-29    133<L>  |  95<L>  |  6<L>  ----------------------------<  147<H>  3.3<L>   |  32<H>  |  0.55    Ca    8.0<L>      29 Apr 2019 12:44      CAPILLARY BLOOD GLUCOSE      POCT Blood Glucose.: 172 mg/dL (29 Apr 2019 11:54)  POCT Blood Glucose.: 165 mg/dL (29 Apr 2019 07:58)  POCT Blood Glucose.: 173 mg/dL (28 Apr 2019 22:02)  POCT Blood Glucose.: 140 mg/dL (28 Apr 2019 17:12)      PT/INR - ( 28 Apr 2019 22:30 )   PT: 29.6 sec;   INR: 2.59 ratio                Assessment and Plan:  69 y/o F with PMH of HTN, dyslipidemia, Diastolic CHF, a-fib (on coumadin), chronic venous insufficiency, DM2, thyroid nodule, ulcerative colitis, p/w LLE erythema / pain    Severe sepsis 2/2 LE bilateral cellulitis w/ chronic venous stasis:  -sepsis resolved  -cellulitis resolving  -s/p course of IV vanco and cefepime.  -Blood culture: no growth  -ID follow up appreciated   -Vascular consult appreciated, s/o debridement, wound looks clean.  Follow up outpatient.  -c/w wound care.  -pain management, transition to oral.   -Lactic acidosis -> resolved    *Chr Diastolic CHF  -Continue lasix     *DM2  -Humalog ISS  -Diabetic diet     *A-fib  -Continue coumadin for goal INR 2-3    *HTN / dyslipidemia / ulcerative colitis / thyroid nodule   -C/w home meds and f/u outpatient for further management     *DVT ppx  -coumadin    Dispo:  -d/c home once medically stable.
CC: leg pain  69 y/o F with PMH of HTN, dyslipidemia, Diastolic CHF, a-fib (on coumadin), chronic venous insufficiency, DM2, thyroid nodule, ulcerative colitis, p/w LLE erythema / pain. Patient states when she woke up she felt fine, and then she went to work and around 10:30am she started to feel burning pain on LLE. Also noticed LE was erythematous, warm and more swollen than usual. Denies any changes in RLE. +Subjective fevers +feels cold. Denies CP, SOB, cough, runny nose, sore throat, fever, chills, nausea, vomiting, abdominal pain, dysuria, increased urinary frequency.   Pt with severe leg pain due to her LE infection/wound.  She is being followed by vascular.  ID on board for IV antibiotic management.  Pt with a lot of pain requiring IV dilaudid.      REVIEW OF SYSTEMS: All other review of systems is negative unless indicated above.    Vital Signs Last 24 Hrs  T(C): 37 (24 Apr 2019 12:01), Max: 37 (23 Apr 2019 22:08)  T(F): 98.6 (24 Apr 2019 12:01), Max: 98.6 (23 Apr 2019 22:08)  HR: 76 (24 Apr 2019 12:01) (74 - 81)  BP: 118/65 (24 Apr 2019 12:01) (114/62 - 156/87)  BP(mean): --  RR: 18 (24 Apr 2019 12:01) (17 - 18)  SpO2: 95% (24 Apr 2019 12:01) (95% - 98%)          Physical Exam:      · Constitutional	Well-developed, well nourished	  · Eyes	EOMI; PERRL; no drainage or redness	  · ENMT	No oral lesions; no gross abnormalities	  · Respiratory	Breath Sounds equal & clear to percussion & auscultation, no accessory muscle use	  · Cardiovascular	Regular rate & rhythm, normal S1, S2; no murmurs, gallops or rubs; no S3, S4	  · Gastrointestinal	Soft, non-tender, no hepatosplenomegaly, normal bowel sounds	  · Extremities	detailed exam	  · Extremities Comments	both legs wrapped in bandage, feet swelling improved	  · Neurological	detailed exam	  · Neurological Details	alert and oriented x 3	             MEDICATIONS  (STANDING):  ascorbic acid 500 milliGRAM(s) Oral daily  aspirin  chewable 81 milliGRAM(s) Oral daily  atorvastatin 10 milliGRAM(s) Oral at bedtime  cefepime   IVPB 2000 milliGRAM(s) IV Intermittent every 12 hours  dextrose 5%. 1000 milliLiter(s) (50 mL/Hr) IV Continuous <Continuous>  dextrose 50% Injectable 12.5 Gram(s) IV Push once  dextrose 50% Injectable 25 Gram(s) IV Push once  dextrose 50% Injectable 25 Gram(s) IV Push once  diltiazem    Tablet 120 milliGRAM(s) Oral two times a day  docusate sodium 100 milliGRAM(s) Oral two times a day  furosemide    Tablet 40 milliGRAM(s) Oral daily  gabapentin 300 milliGRAM(s) Oral daily  insulin lispro (HumaLOG) corrective regimen sliding scale   SubCutaneous three times a day before meals  metoprolol succinate  milliGRAM(s) Oral daily  montelukast 10 milliGRAM(s) Oral at bedtime  nystatin Powder 1 Application(s) Topical three times a day  silver sulfADIAZINE 1% Cream 1 Application(s) Topical daily  vancomycin  IVPB 1000 milliGRAM(s) IV Intermittent every 8 hours  warfarin 5 milliGRAM(s) Oral at bedtime    MEDICATIONS  (PRN):  acetaminophen   Tablet .. 650 milliGRAM(s) Oral every 6 hours PRN Temp greater or equal to 38C (100.4F)  dextrose 40% Gel 15 Gram(s) Oral once PRN Blood Glucose LESS THAN 70 milliGRAM(s)/deciliter  diphenhydrAMINE 25 milliGRAM(s) Oral every 6 hours PRN Rash and/or Itching  glucagon  Injectable 1 milliGRAM(s) IntraMuscular once PRN Glucose LESS THAN 70 milligrams/deciliter  HYDROmorphone  Injectable 2 milliGRAM(s) IV Push every 4 hours PRN Severe Pain (7 - 10)  oxyCODONE    IR 5 milliGRAM(s) Oral every 4 hours PRN Mild Pain (1 - 3)          04-23    138  |  101  |  16  ----------------------------<  152<H>  3.8   |  30  |  0.57    Ca    8.4<L>      23 Apr 2019 07:09      CAPILLARY BLOOD GLUCOSE      POCT Blood Glucose.: 210 mg/dL (24 Apr 2019 12:36)  POCT Blood Glucose.: 207 mg/dL (24 Apr 2019 07:45)  POCT Blood Glucose.: 218 mg/dL (23 Apr 2019 22:33)  POCT Blood Glucose.: 200 mg/dL (23 Apr 2019 17:24)      PT/INR - ( 24 Apr 2019 07:29 )   PT: 28.1 sec;   INR: 2.46 ratio                Assessment and Plan:  69 y/o F with PMH of HTN, dyslipidemia, Diastolic CHF, a-fib (on coumadin), chronic venous insufficiency, DM2, thyroid nodule, ulcerative colitis, p/w LLE erythema / pain    Severe sepsis 2/2 LE bilateral cellulitis:  -Chr venous stasis  -Continue IV vanco and cefepime, adjustment per ID  -Blood culture: no growth  -ID follow up appreciated   -Vascular consult appreciated, s/o debridement   -pain management, requiring 2mg dilaudid IV q4h   -Lactic acidosis -> resolved    *Chr Diastolic CHF  -Continue lasix     *DM2  -Humalog ISS  -Diabetic diet     *A-fib  -Continue coumadin for goal INR 2-3    *HTN / dyslipidemia / ulcerative colitis / thyroid nodule   -C/w home meds and f/u outpatient for further management     *DVT ppx  -coumadin    Dispo:  -d/c to [facility] once medically stable                                        Electronic Signatures:  Gonzalo Palacios)  (Signed 23-Apr-2019 13:06)  	Authored: Progress Note, Reason for Admission, Subjective and Objective      Last Updated: 23-Apr-2019 13:06 by Gonzalo Palacios ()
CC: leg pain  69 y/o F with PMH of HTN, dyslipidemia, Diastolic CHF, a-fib (on coumadin), chronic venous insufficiency, DM2, thyroid nodule, ulcerative colitis, p/w LLE erythema / pain. Patient states when she woke up she felt fine, and then she went to work and around 10:30am she started to feel burning pain on LLE. Also noticed LE was erythematous, warm and more swollen than usual. Denies any changes in RLE. +Subjective fevers +feels cold. Denies CP, SOB, cough, runny nose, sore throat, fever, chills, nausea, vomiting, abdominal pain, dysuria, increased urinary frequency.   Pt with severe leg pain due to her LE infection/wound.  She is being followed by vascular.  ID on board for IV antibiotic management.  Pt with a lot of pain requiring IV dilaudid.  She is ambulating with PT.  Her Hospital course complicated by severe pain with difficulty to treat on oral pain meds.  She has ongoing leg pain due to ulceration/lymphadema with superimposed cellulitis.  I reassured her that her leg is improving but will take time.  She is mildly anxious but still requiring a lot of opioid therapy for symptomatic care.  Hospital course further complicated by worsening erythema requiring antibiotics to be changed.      4/30: Pt still uncomfortable, with pain.  No chills, n, v.  05/01/19: Patient seen and examined. Now complaining of right thigh redness and pain. Discussed with patient and  at bed side regarding management plan. Discussed with Dr Hoyos.   05/02/19: Patient seen and examined. Redness improving after starting steroid.     REVIEW OF SYSTEMS: All other review of systems is negative unless indicated above.      Vital Signs Last 24 Hrs  T(C): 37 (02 May 2019 11:51), Max: 37.7 (01 May 2019 17:04)  T(F): 98.6 (02 May 2019 11:51), Max: 99.8 (01 May 2019 17:04)  HR: 85 (02 May 2019 11:51) (85 - 98)  BP: 105/60 (02 May 2019 11:51) (105/60 - 128/67)  BP(mean): --  RR: 18 (02 May 2019 11:51) (18 - 18)  SpO2: 93% (02 May 2019 11:51) (93% - 94%)        PHYSICAL EXAM:    Constitutional: awake and alert, mild distress  HEENT: PERR, EOMI, Normal Hearing, MMM  Neck: Soft and supple  Respiratory: Breath sounds are clear bilaterally, No wheezing, rales or rhonchi  Cardiovascular: S1 and S2, regular rate and rhythm, no Murmurs, gallops or rubs  Gastrointestinal: Bowel Sounds present, large abd, soft, nontender, nondistended, no guarding, no rebound  Extremities: leg edema b/l greatly improved,  still with erythema on legs, ulcerations clean, intact  Neurological: A/O x 3, no focal deficits in my limited exam          Labs:                                                 12.2   9.64  )-----------( 344      ( 01 May 2019 10:16 )             38.2     01 May 2019 10:16    134    |  96     |  6      ----------------------------<  160    3.5     |  31     |  0.59     Ca    8.2        01 May 2019 10:16  Mg     1.6       01 May 2019 10:16        PT/INR - ( 02 May 2019 06:24 )   PT: 33.7 sec;   INR: 2.94 ratio           CAPILLARY BLOOD GLUCOSE      POCT Blood Glucose.: 348 mg/dL (02 May 2019 12:27)  POCT Blood Glucose.: 194 mg/dL (02 May 2019 07:37)  POCT Blood Glucose.: 165 mg/dL (01 May 2019 21:05)  POCT Blood Glucose.: 111 mg/dL (01 May 2019 17:29)        MEDICATIONS:    ascorbic acid 500 milliGRAM(s) Oral daily  aspirin  chewable 81 milliGRAM(s) Oral daily  atorvastatin 10 milliGRAM(s) Oral at bedtime  dextrose 5%. 1000 milliLiter(s) (50 mL/Hr) IV Continuous <Continuous>  dextrose 50% Injectable 12.5 Gram(s) IV Push once  dextrose 50% Injectable 25 Gram(s) IV Push once  dextrose 50% Injectable 25 Gram(s) IV Push once  diltiazem    Tablet 120 milliGRAM(s) Oral two times a day  docusate sodium 100 milliGRAM(s) Oral three times a day  furosemide    Tablet 40 milliGRAM(s) Oral daily  gabapentin 300 milliGRAM(s) Oral two times a day  HYDROmorphone   Tablet 4 milliGRAM(s) Oral every 6 hours  insulin lispro (HumaLOG) corrective regimen sliding scale   SubCutaneous three times a day before meals  ketorolac   Injectable 15 milliGRAM(s) IV Push every 6 hours  melatonin 5 milliGRAM(s) Oral at bedtime  metoprolol succinate  milliGRAM(s) Oral daily  montelukast 10 milliGRAM(s) Oral at bedtime  nystatin Powder 1 Application(s) Topical three times a day  senna 2 Tablet(s) Oral at bedtime  silver sulfADIAZINE 1% Cream 1 Application(s) Topical daily  vancomycin  IVPB 750 milliGRAM(s) IV Intermittent every 8 hours  warfarin 5 milliGRAM(s) Oral at bedtime    MEDICATIONS  (PRN):  acetaminophen   Tablet .. 650 milliGRAM(s) Oral every 6 hours PRN Temp greater or equal to 38C (100.4F)  ALPRAZolam 0.25 milliGRAM(s) Oral two times a day PRN anxiety  dextrose 40% Gel 15 Gram(s) Oral once PRN Blood Glucose LESS THAN 70 milliGRAM(s)/deciliter  glucagon  Injectable 1 milliGRAM(s) IntraMuscular once PRN Glucose LESS THAN 70 milligrams/deciliter  HYDROmorphone  Injectable 2 milliGRAM(s) IV Push every 8 hours PRN breakthrough pain  ondansetron Injectable 4 milliGRAM(s) IV Push every 6 hours PRN Nausea and/or Vomiting                  Assessment and Plan:  69 y/o F with PMH of HTN, dyslipidemia, Diastolic CHF, a-fib (on coumadin), chronic venous insufficiency, DM2, thyroid nodule, ulcerative colitis, p/w LLE erythema / pain    Severe sepsis 2/2 LE bilateral cellulitis w/ chronic venous stasis: Slow to improve  -sepsis resolved  -cefepime changed to ceftriaxone, now discontinued  -Continue IV vanco as per Dr Hoyos  -Dr Andrade consult apprecaited.  -Continue steroid  -Blood culture: no growth  -ID follow up appreciated   -Vascular following, s/p debridement.  -c/w wound care as per vascular  -pain management, transition to oral  -bowel regimen      *Chr Diastolic CHF  -Continue lasix   -kcl supplementation    *DM2  -Humalog ISS  -Diabetic diet     *A-fib  -Continue coumadin for goal INR 2-3    *HTN / dyslipidemia / ulcerative colitis / thyroid nodule   -C/w home meds and f/u outpatient for further management     *DVT ppx  -coumadin    Dispo:  -d/c home once medically stable.  follow up ID and vascular recs.
Date of service: 04-22-19 @ 13:17      Patient sitting in chair, afebrile      ROS: no fever or chills; denies dizziness, no HA, no SOB or cough, no abdominal pain, no diarrhea or constipation; no dysuria, no urinary frequency, no rashes    MEDICATIONS  (STANDING):  ascorbic acid 500 milliGRAM(s) Oral daily  aspirin  chewable 81 milliGRAM(s) Oral daily  atorvastatin 10 milliGRAM(s) Oral at bedtime  cefepime   IVPB 2000 milliGRAM(s) IV Intermittent every 12 hours  dextrose 5%. 1000 milliLiter(s) (50 mL/Hr) IV Continuous <Continuous>  dextrose 50% Injectable 12.5 Gram(s) IV Push once  dextrose 50% Injectable 25 Gram(s) IV Push once  dextrose 50% Injectable 25 Gram(s) IV Push once  diltiazem    Tablet 120 milliGRAM(s) Oral two times a day  furosemide    Tablet 40 milliGRAM(s) Oral daily  gabapentin 300 milliGRAM(s) Oral daily  insulin lispro (HumaLOG) corrective regimen sliding scale   SubCutaneous three times a day before meals  metoprolol succinate  milliGRAM(s) Oral daily  montelukast 10 milliGRAM(s) Oral at bedtime  nystatin Powder 1 Application(s) Topical three times a day  silver sulfADIAZINE 1% Cream 1 Application(s) Topical daily  uceris 9 milliGRAM(s) 9 mG Oral daily  vancomycin  IVPB 1000 milliGRAM(s) IV Intermittent every 8 hours  warfarin 5 milliGRAM(s) Oral at bedtime    MEDICATIONS  (PRN):  acetaminophen   Tablet .. 650 milliGRAM(s) Oral every 6 hours PRN Temp greater or equal to 38C (100.4F)  dextrose 40% Gel 15 Gram(s) Oral once PRN Blood Glucose LESS THAN 70 milliGRAM(s)/deciliter  diphenhydrAMINE 25 milliGRAM(s) Oral every 6 hours PRN Rash and/or Itching  docusate sodium 100 milliGRAM(s) Oral three times a day PRN Constipation  glucagon  Injectable 1 milliGRAM(s) IntraMuscular once PRN Glucose LESS THAN 70 milligrams/deciliter  HYDROmorphone  Injectable 2 milliGRAM(s) IV Push every 4 hours PRN Severe Pain (7 - 10)  HYDROmorphone  Injectable 0.5 milliGRAM(s) IV Push every 6 hours PRN Moderate Pain (4 - 6)  oxyCODONE    IR 5 milliGRAM(s) Oral every 4 hours PRN Mild Pain (1 - 3)  zolpidem 5 milliGRAM(s) Oral at bedtime PRN Insomnia      Vital Signs Last 24 Hrs  T(C): 37 (22 Apr 2019 11:39), Max: 37 (22 Apr 2019 11:39)  T(F): 98.6 (22 Apr 2019 11:39), Max: 98.6 (22 Apr 2019 11:39)  HR: 82 (22 Apr 2019 11:39) (79 - 86)  BP: 126/70 (22 Apr 2019 11:39) (126/70 - 153/86)  BP(mean): --  RR: 18 (22 Apr 2019 11:39) (18 - 18)  SpO2: 97% (22 Apr 2019 11:39) (96% - 97%)    Physical Exam:        Constitutional: frail looking  HEENT: NC/AT, EOMI, PERRLA, conjunctivae clear  Neck: supple; thyroid not palpable  Back: no tenderness  Respiratory: respiratory effort normal; clear to auscultation  Cardiovascular: S1S2 regular, no murmurs  Abdomen: soft, not tender, not distended, positive BS;   Genitourinary: no suprapubic tenderness  Musculoskeletal: no muscle tenderness, no joint swelling or tenderness  Extremities: 2+ pedal edema  LLE extensive erythema, edema, tenderness, warmth over left ankle, shin and calf; broken skin with scant discharge s/p debridement; legs wrapped in ace  Left knee area erythema and warmth is resolving  RLE chronic stasis skin changes  Neurological/ Psychiatric: AxOx3, moving all extremities  Skin: no rashes; no palpable lesions    Labs: reviewed                Labs:    04-21    139  |  104  |  17  ----------------------------<  225<H>  4.0   |  28  |  0.56    Ca    8.3<L>      21 Apr 2019 05:35         Vancomycin Level, Trough: 6.7 ug/mL (04-21 @ 05:35)      Cultures:       Culture - Urine (collected 04-16-19 @ 20:35)  Source: .Urine None  Final Report (04-17-19 @ 21:42):    No growth    Culture - Blood (collected 04-16-19 @ 17:54)  Source: .Blood None  Final Report (04-22-19 @ 01:01):    No growth at 5 days.    Culture - Blood (collected 04-16-19 @ 17:53)  Source: .Blood None  Final Report (04-22-19 @ 07:01):    No growth at 5 days.            Radiology: all available radiological tests reviewed    Advanced directives addressed: full resuscitation
Date of service: 04-26-19 @ 15:34      Patient lying in bed; has leg pain      ROS: no fever or chills; denies dizziness, no HA, no SOB or cough, no abdominal pain, no diarrhea or constipation; no dysuria, no urinary frequency, no rashes    MEDICATIONS  (STANDING):  ascorbic acid 500 milliGRAM(s) Oral daily  aspirin  chewable 81 milliGRAM(s) Oral daily  atorvastatin 10 milliGRAM(s) Oral at bedtime  cefepime   IVPB 2000 milliGRAM(s) IV Intermittent every 12 hours  dextrose 5%. 1000 milliLiter(s) (50 mL/Hr) IV Continuous <Continuous>  dextrose 50% Injectable 12.5 Gram(s) IV Push once  dextrose 50% Injectable 25 Gram(s) IV Push once  dextrose 50% Injectable 25 Gram(s) IV Push once  diltiazem    Tablet 120 milliGRAM(s) Oral two times a day  docusate sodium 100 milliGRAM(s) Oral two times a day  furosemide    Tablet 40 milliGRAM(s) Oral daily  gabapentin 300 milliGRAM(s) Oral daily  insulin lispro (HumaLOG) corrective regimen sliding scale   SubCutaneous three times a day before meals  metoprolol succinate  milliGRAM(s) Oral daily  montelukast 10 milliGRAM(s) Oral at bedtime  nystatin Powder 1 Application(s) Topical three times a day  silver sulfADIAZINE 1% Cream 1 Application(s) Topical daily  vancomycin  IVPB 1000 milliGRAM(s) IV Intermittent every 8 hours  warfarin 5 milliGRAM(s) Oral at bedtime    MEDICATIONS  (PRN):  acetaminophen   Tablet .. 650 milliGRAM(s) Oral every 6 hours PRN Temp greater or equal to 38C (100.4F)  dextrose 40% Gel 15 Gram(s) Oral once PRN Blood Glucose LESS THAN 70 milliGRAM(s)/deciliter  diphenhydrAMINE 25 milliGRAM(s) Oral every 6 hours PRN Rash and/or Itching  glucagon  Injectable 1 milliGRAM(s) IntraMuscular once PRN Glucose LESS THAN 70 milligrams/deciliter  HYDROmorphone  Injectable 2 milliGRAM(s) IV Push every 4 hours PRN Severe Pain (7 - 10)      Vital Signs Last 24 Hrs  T(C): 37 (26 Apr 2019 11:48), Max: 37 (26 Apr 2019 11:48)  T(F): 98.6 (26 Apr 2019 11:48), Max: 98.6 (26 Apr 2019 11:48)  HR: 71 (26 Apr 2019 11:48) (67 - 78)  BP: 108/73 (26 Apr 2019 11:48) (103/64 - 138/57)  BP(mean): --  RR: 18 (26 Apr 2019 11:48) (18 - 18)  SpO2: 95% (26 Apr 2019 11:48) (93% - 100%)    Physical Exam:        Constitutional: frail looking  HEENT: NC/AT, EOMI, PERRLA, conjunctivae clear  Neck: supple; thyroid not palpable  Back: no tenderness  Respiratory: respiratory effort normal; clear to auscultation  Cardiovascular: S1S2 regular, no murmurs  Abdomen: soft, not tender, not distended, positive BS;   Genitourinary: no suprapubic tenderness  Musculoskeletal: no muscle tenderness, no joint swelling or tenderness  Extremities: 2+ pedal edema  LLE extensive erythema, edema, tenderness, warmth over left ankle, shin and calf; broken skin with scant discharge s/p debridement; legs wrapped in ace  Reviewed pictures of legs, skin desquamation and hypertrophy of skin at ankles  Left knee area erythema and warmth is resolving  RLE chronic stasis skin changes  Neurological/ Psychiatric: AxOx3, moving all extremities  Skin: no rashes; no palpable lesions    Labs: reviewed                  Labs:                        13.1   11.88 )-----------( 450      ( 25 Apr 2019 07:45 )             41.5     04-25    135  |  100  |  13  ----------------------------<  148<H>  3.7   |  32<H>  |  0.56    Ca    8.6      25 Apr 2019 07:45             Cultures:             Radiology: all available radiological tests reviewed    Advanced directives addressed: full resuscitation
Date of service: 04-29-19 @ 13:32    Lying in bed in NAD  Weak looking  Has left leg pain; poorly controlled    ROS: no fever or chills; denies dizziness, no HA, no SOB or cough, no abdominal pain, no diarrhea or constipation; no dysuria, has leg pain    MEDICATIONS  (STANDING):  ALPRAZolam 0.25 milliGRAM(s) Oral three times a day  ascorbic acid 500 milliGRAM(s) Oral daily  aspirin  chewable 81 milliGRAM(s) Oral daily  atorvastatin 10 milliGRAM(s) Oral at bedtime  cefTRIAXone Injectable. 2000 milliGRAM(s) IV Push every 24 hours  dextrose 5%. 1000 milliLiter(s) (50 mL/Hr) IV Continuous <Continuous>  dextrose 50% Injectable 12.5 Gram(s) IV Push once  dextrose 50% Injectable 25 Gram(s) IV Push once  dextrose 50% Injectable 25 Gram(s) IV Push once  diltiazem    Tablet 120 milliGRAM(s) Oral two times a day  docusate sodium 100 milliGRAM(s) Oral two times a day  furosemide    Tablet 40 milliGRAM(s) Oral daily  gabapentin 300 milliGRAM(s) Oral two times a day  HYDROmorphone   Tablet 4 milliGRAM(s) Oral every 6 hours  insulin lispro (HumaLOG) corrective regimen sliding scale   SubCutaneous three times a day before meals  metoprolol succinate  milliGRAM(s) Oral daily  montelukast 10 milliGRAM(s) Oral at bedtime  nystatin Powder 1 Application(s) Topical three times a day  senna 2 Tablet(s) Oral at bedtime  silver sulfADIAZINE 1% Cream 1 Application(s) Topical daily  vancomycin  IVPB 750 milliGRAM(s) IV Intermittent every 8 hours  warfarin 5 milliGRAM(s) Oral at bedtime      Vital Signs Last 24 Hrs  T(C): 37.8 (29 Apr 2019 11:47), Max: 37.8 (29 Apr 2019 11:47)  T(F): 100 (29 Apr 2019 11:47), Max: 100 (29 Apr 2019 11:47)  HR: 100 (29 Apr 2019 11:47) (93 - 115)  BP: 114/61 (29 Apr 2019 11:47) (114/61 - 151/59)  BP(mean): --  RR: 18 (29 Apr 2019 11:47) (18 - 20)  SpO2: 90% (29 Apr 2019 11:47) (90% - 99%)    Physical Exam:    Constitutional: frail looking  HEENT: NC/AT, EOMI, PERRLA, conjunctivae clear  Neck: supple; thyroid not palpable  Back: no tenderness  Respiratory: respiratory effort normal; clear to auscultation  Cardiovascular: S1S2 regular, no murmurs  Abdomen: soft, not tender, not distended, positive BS;   Genitourinary: no suprapubic tenderness  Musculoskeletal: no muscle tenderness, no joint swelling or tenderness  Extremities: 2+ pedal edema  LLE extensive erythema, edema, tenderness, warmth over left ankle, shin and calf; broken skin with scant discharge s/p debridement - much improved  Reviewed pictures of legs, skin desquamation and hypertrophy of skin at ankles  Left knee area erythema and warmth is resolving  RLE chronic stasis skin changes  Neurological/ Psychiatric: AxOx3, moving all extremities  Skin: no rashes; no palpable lesions    Labs: reviewed                        12.0   10.64 )-----------( 305      ( 29 Apr 2019 12:43 )             37.9     04-29    133<L>  |  95<L>  |  6<L>  ----------------------------<  147<H>  3.3<L>   |  32<H>  |  0.55    Ca    8.0<L>      29 Apr 2019 12:44                        13.1   11.88 )-----------( 450      ( 25 Apr 2019 07:45 )             41.5     04-25    135  |  100  |  13  ----------------------------<  148<H>  3.7   |  32<H>  |  0.56    Ca    8.6      25 Apr 2019 07:45       Cultures:       Radiology: all available radiological tests reviewed    Advanced directives addressed: full resuscitation
Date of service: 04-30-19 @ 11:53    OOB   Ambulatory with help  Has right lower leg pain  DIfficulty walking    ROS: no fever or chills; denies dizziness, no HA, no SOB or cough, no abdominal pain, no diarrhea or constipation; no dysuria    MEDICATIONS  (STANDING):  ALPRAZolam 0.25 milliGRAM(s) Oral three times a day  ascorbic acid 500 milliGRAM(s) Oral daily  aspirin  chewable 81 milliGRAM(s) Oral daily  atorvastatin 10 milliGRAM(s) Oral at bedtime  cefTRIAXone Injectable. 2000 milliGRAM(s) IV Push every 24 hours  dextrose 5%. 1000 milliLiter(s) (50 mL/Hr) IV Continuous <Continuous>  dextrose 50% Injectable 12.5 Gram(s) IV Push once  dextrose 50% Injectable 25 Gram(s) IV Push once  dextrose 50% Injectable 25 Gram(s) IV Push once  diltiazem    Tablet 120 milliGRAM(s) Oral two times a day  docusate sodium 100 milliGRAM(s) Oral two times a day  furosemide    Tablet 40 milliGRAM(s) Oral daily  gabapentin 300 milliGRAM(s) Oral two times a day  HYDROmorphone   Tablet 4 milliGRAM(s) Oral every 6 hours  insulin lispro (HumaLOG) corrective regimen sliding scale   SubCutaneous three times a day before meals  melatonin 5 milliGRAM(s) Oral at bedtime  metoprolol succinate  milliGRAM(s) Oral daily  montelukast 10 milliGRAM(s) Oral at bedtime  nystatin Powder 1 Application(s) Topical three times a day  senna 2 Tablet(s) Oral at bedtime  silver sulfADIAZINE 1% Cream 1 Application(s) Topical daily  vancomycin  IVPB 750 milliGRAM(s) IV Intermittent every 8 hours  warfarin 5 milliGRAM(s) Oral at bedtime      Vital Signs Last 24 Hrs  T(C): 37 (30 Apr 2019 05:16), Max: 37.4 (29 Apr 2019 17:24)  T(F): 98.6 (30 Apr 2019 05:16), Max: 99.4 (29 Apr 2019 17:24)  HR: 121 (30 Apr 2019 05:16) (108 - 121)  BP: 122/76 (30 Apr 2019 05:16) (119/69 - 122/76)  BP(mean): 85 (30 Apr 2019 05:16) (85 - 85)  RR: 19 (30 Apr 2019 05:16) (18 - 19)  SpO2: 97% (30 Apr 2019 05:16) (94% - 97%)    Physical Exam:      Constitutional: frail looking  HEENT: NC/AT, EOMI, PERRLA, conjunctivae clear  Neck: supple; thyroid not palpable  Back: no tenderness  Respiratory: respiratory effort normal; clear to auscultation  Cardiovascular: S1S2 regular, no murmurs  Abdomen: soft, not tender, not distended, positive BS;   Genitourinary: no suprapubic tenderness  Musculoskeletal: no muscle tenderness, no joint swelling or tenderness  Extremities: 2+ pedal edema  LLE extensive erythema, edema, tenderness, warmth over left ankle, shin and calf; broken skin with scant discharge s/p debridement - much improved  Lower legs, skin desquamation and hypertrophy of skin at ankles  Left knee area erythema and warmth is resolving  RLE chronic stasis skin changes  Neurological/ Psychiatric: AxOx3, moving all extremities  Skin: no rashes; no palpable lesions    Labs: reviewed                        12.0   10.64 )-----------( 305      ( 29 Apr 2019 12:43 )             37.9     04-30    134<L>  |  96  |  6<L>  ----------------------------<  154<H>  3.1<L>   |  33<H>  |  0.48<L>    Ca    8.5      30 Apr 2019 05:28    Vancomycin Level, Trough: 11.6 ug/mL (04-30 @ 05:28)                                   13.1   11.88 )-----------( 450      ( 25 Apr 2019 07:45 )             41.5     04-25    135  |  100  |  13  ----------------------------<  148<H>  3.7   |  32<H>  |  0.56    Ca    8.6      25 Apr 2019 07:45       Cultures:       Radiology: all available radiological tests reviewed    Advanced directives addressed: full resuscitation
Date of service: 05-02-19 @ 13:32    Events noted   Was seen by dermatology and given prednisone  Right thigh rash is improving  Leg pains are improved as well  Anxious    ROS: denies dizziness, no HA, no SOB or cough, no abdominal pain, no diarrhea or constipation; no dysuria    MEDICATIONS  (STANDING):  ascorbic acid 500 milliGRAM(s) Oral daily  aspirin  chewable 81 milliGRAM(s) Oral daily  atorvastatin 10 milliGRAM(s) Oral at bedtime  dextrose 5%. 1000 milliLiter(s) (50 mL/Hr) IV Continuous <Continuous>  dextrose 50% Injectable 12.5 Gram(s) IV Push once  dextrose 50% Injectable 25 Gram(s) IV Push once  dextrose 50% Injectable 25 Gram(s) IV Push once  diltiazem    Tablet 120 milliGRAM(s) Oral two times a day  docusate sodium 100 milliGRAM(s) Oral three times a day  furosemide    Tablet 40 milliGRAM(s) Oral daily  gabapentin 300 milliGRAM(s) Oral two times a day  HYDROmorphone   Tablet 4 milliGRAM(s) Oral every 6 hours  insulin lispro (HumaLOG) corrective regimen sliding scale   SubCutaneous three times a day before meals  melatonin 5 milliGRAM(s) Oral at bedtime  metoprolol succinate  milliGRAM(s) Oral daily  montelukast 10 milliGRAM(s) Oral at bedtime  nystatin Powder 1 Application(s) Topical three times a day  predniSONE   Tablet 60 milliGRAM(s) Oral daily  senna 2 Tablet(s) Oral at bedtime  silver sulfADIAZINE 1% Cream 1 Application(s) Topical daily  vancomycin  IVPB 750 milliGRAM(s) IV Intermittent every 8 hours  warfarin 5 milliGRAM(s) Oral at bedtime      Vital Signs Last 24 Hrs  T(C): 37 (02 May 2019 11:51), Max: 37.7 (01 May 2019 17:04)  T(F): 98.6 (02 May 2019 11:51), Max: 99.8 (01 May 2019 17:04)  HR: 85 (02 May 2019 11:51) (85 - 98)  BP: 105/60 (02 May 2019 11:51) (105/60 - 128/67)  BP(mean): --  RR: 18 (02 May 2019 11:51) (18 - 18)  SpO2: 93% (02 May 2019 11:51) (93% - 94%)    Physical Exam:      Constitutional: frail looking  HEENT: NC/AT, EOMI, PERRLA, conjunctivae clear  Neck: supple; thyroid not palpable  Back: no tenderness  Respiratory: respiratory effort normal; clear to auscultation  Cardiovascular: S1S2 regular, no murmurs  Abdomen: soft, not tender, not distended, positive BS;   Genitourinary: no suprapubic tenderness  Musculoskeletal: no muscle tenderness, no joint swelling or tenderness  Extremities: 2+ pedal edema  LLE extensive erythema, edema, tenderness, warmth over left ankle, shin and calf; broken skin with scant discharge s/p debridement - much improved  Lower legs, skin desquamation and hypertrophy of skin at ankles  RLE chronic stasis skin changes; right thigh area noted with several patches of erythema, tenderness and mid edema; no skin break - improving  Neurological/ Psychiatric: AxOx3, moving all extremities  Skin: no rashes; no palpable lesions    Labs: reviewed                        12.2   9.64  )-----------( 344      ( 01 May 2019 10:16 )             38.2     05-01    134<L>  |  96  |  6<L>  ----------------------------<  160<H>  3.5   |  31  |  0.59    Ca    8.2<L>      01 May 2019 10:16  Mg     1.6     05-01    Vancomycin Level, Trough: 11.6 ug/mL (04-30 @ 05:28)                          13.1   11.88 )-----------( 450      ( 25 Apr 2019 07:45 )             41.5     04-25    135  |  100  |  13  ----------------------------<  148<H>  3.7   |  32<H>  |  0.56    Ca    8.6      25 Apr 2019 07:45       Cultures:         Radiology: all available radiological tests reviewed    Advanced directives addressed: full resuscitation
Date of service: 05-03-19 @ 12:59    OOB to chair  Leg pain is improving  Left leg redness is resolving    ROS: no fever or chills; denies dizziness, no HA, no SOB or cough, no abdominal pain, no diarrhea or constipation; no dysuria,, no new rashes    MEDICATIONS  (STANDING):  ascorbic acid 500 milliGRAM(s) Oral daily  aspirin  chewable 81 milliGRAM(s) Oral daily  atorvastatin 10 milliGRAM(s) Oral at bedtime  dextrose 5%. 1000 milliLiter(s) (50 mL/Hr) IV Continuous <Continuous>  dextrose 50% Injectable 12.5 Gram(s) IV Push once  dextrose 50% Injectable 25 Gram(s) IV Push once  dextrose 50% Injectable 25 Gram(s) IV Push once  diltiazem    Tablet 120 milliGRAM(s) Oral two times a day  docusate sodium 100 milliGRAM(s) Oral three times a day  doxycycline hyclate Capsule 100 milliGRAM(s) Oral every 12 hours  furosemide    Tablet 40 milliGRAM(s) Oral daily  gabapentin 300 milliGRAM(s) Oral two times a day  HYDROmorphone   Tablet 4 milliGRAM(s) Oral every 6 hours  insulin lispro (HumaLOG) corrective regimen sliding scale   SubCutaneous three times a day before meals  melatonin 5 milliGRAM(s) Oral at bedtime  metoprolol succinate  milliGRAM(s) Oral daily  montelukast 10 milliGRAM(s) Oral at bedtime  nystatin Powder 1 Application(s) Topical three times a day  predniSONE   Tablet 60 milliGRAM(s) Oral daily  senna 2 Tablet(s) Oral at bedtime  silver sulfADIAZINE 1% Cream 1 Application(s) Topical daily  warfarin 5 milliGRAM(s) Oral at bedtime      Vital Signs Last 24 Hrs  T(C): 36.9 (03 May 2019 11:50), Max: 37.1 (02 May 2019 17:13)  T(F): 98.5 (03 May 2019 11:50), Max: 98.7 (02 May 2019 17:13)  HR: 93 (03 May 2019 11:50) (80 - 93)  BP: 124/63 (03 May 2019 11:50) (124/63 - 126/72)  BP(mean): --  RR: 18 (03 May 2019 11:50) (17 - 18)  SpO2: 95% (03 May 2019 11:50) (93% - 95%)    Physical Exam:      Constitutional: frail looking  HEENT: NC/AT, EOMI, PERRLA, conjunctivae clear  Neck: supple; thyroid not palpable  Back: no tenderness  Respiratory: respiratory effort normal; clear to auscultation  Cardiovascular: S1S2 regular, no murmurs  Abdomen: soft, not tender, not distended, positive BS;   Genitourinary: no suprapubic tenderness  Musculoskeletal: no muscle tenderness, no joint swelling or tenderness  Extremities: 2+ pedal edema  LLE extensive erythema, edema, tenderness, warmth over left ankle, shin and calf; broken skin with scant discharge s/p debridement - much improved  Lower legs, skin desquamation and hypertrophy of skin at ankles  RLE chronic stasis skin changes; right thigh area noted with several patches of erythema - improving  Neurological/ Psychiatric: AxOx3, moving all extremities  Skin: no rashes; no palpable lesions    Labs: reviewed                        11.1   12.16 )-----------( 376      ( 03 May 2019 07:13 )             33.8     05-03    136  |  98  |  17  ----------------------------<  179<H>  3.3<L>   |  33<H>  |  0.56    Ca    8.3<L>      03 May 2019 07:13                                  12.2   9.64  )-----------( 344      ( 01 May 2019 10:16 )             38.2     05-01    134<L>  |  96  |  6<L>  ----------------------------<  160<H>  3.5   |  31  |  0.59    Ca    8.2<L>      01 May 2019 10:16  Mg     1.6     05-01    Vancomycin Level, Trough: 11.6 ug/mL (04-30 @ 05:28)                          13.1   11.88 )-----------( 450      ( 25 Apr 2019 07:45 )             41.5     04-25    135  |  100  |  13  ----------------------------<  148<H>  3.7   |  32<H>  |  0.56    Ca    8.6      25 Apr 2019 07:45       Cultures:         Radiology: all available radiological tests reviewed    Advanced directives addressed: full resuscitation
Date of service: 19 @ 10:23    Lying in bed in NAD  Weak looking  s/p right lower leg wound debridement by vascular surgeon  Has low grade fever    ROS: denies dizziness, no HA, no SOB or cough, no abdominal pain, no diarrhea or constipation; no dysuria, has right legs pain    MEDICATIONS  (STANDING):  ascorbic acid 500 milliGRAM(s) Oral daily  aspirin  chewable 81 milliGRAM(s) Oral daily  atorvastatin 10 milliGRAM(s) Oral at bedtime  cefepime   IVPB 2000 milliGRAM(s) IV Intermittent every 12 hours  dextrose 5%. 1000 milliLiter(s) (50 mL/Hr) IV Continuous <Continuous>  dextrose 50% Injectable 12.5 Gram(s) IV Push once  dextrose 50% Injectable 25 Gram(s) IV Push once  dextrose 50% Injectable 25 Gram(s) IV Push once  diltiazem    Tablet 120 milliGRAM(s) Oral two times a day  furosemide    Tablet 40 milliGRAM(s) Oral daily  gabapentin 300 milliGRAM(s) Oral daily  insulin lispro (HumaLOG) corrective regimen sliding scale   SubCutaneous three times a day before meals  metoprolol succinate  milliGRAM(s) Oral daily  montelukast 10 milliGRAM(s) Oral at bedtime  nystatin Powder 1 Application(s) Topical three times a day  uceris 9 milliGRAM(s) 9 mG Oral daily  vancomycin  IVPB 1000 milliGRAM(s) IV Intermittent every 12 hours      Vital Signs Last 24 Hrs  T(C): 36.8 (2019 04:18), Max: 37.8 (2019 10:57)  T(F): 98.3 (2019 04:18), Max: 100.1 (2019 10:57)  HR: 93 (2019 04:18) (80 - 93)  BP: 145/88 (2019 04:18) (106/54 - 145/88)  BP(mean): --  RR: 19 (2019 04:18) (18 - 20)  SpO2: 98% (2019 04:18) (93% - 98%)    Physical Exam:      Constitutional: frail looking  HEENT: NC/AT, EOMI, PERRLA, conjunctivae clear  Neck: supple; thyroid not palpable  Back: no tenderness  Respiratory: respiratory effort normal; clear to auscultation  Cardiovascular: S1S2 regular, no murmurs  Abdomen: soft, not tender, not distended, positive BS;   Genitourinary: no suprapubic tenderness  Lymphatic: no LN palpable  Musculoskeletal: no muscle tenderness, no joint swelling or tenderness  Extremities: 2+ pedal edema  LLE extensive erythema, edema, tenderness, warmth over left ankle, shin and calf; broken skin with scant discharge s/p debridement  Left knee area erythema and warmth is resolving  RLE chronic stasis skin changes  Neurological/ Psychiatric: AxOx3, moving all extremities  Skin: no rashes; no palpable lesions    Labs: reviewed                        12.   12 )-----------( 271      ( 2019 05:25 )             37.9     -    137  |  101  |  8   ----------------------------<  179<H>  4.0   |  27  |  0.43<L>    Ca    8.8      2019 05:25      Vancomycin Level, Trough: 2.7 ug/mL ( @ 05:25)                        12.4   11.37 )-----------( 257      ( 2019 05:58 )             38.6     04-18    136  |  101  |  8   ----------------------------<  140<H>  3.5   |  28  |  0.50    Ca    8.4<L>      2019 05:58  Phos  3.4     04-17  Mg     1.6     04-17    TPro  6.3  /  Alb  2.4<L>  /  TBili  0.9  /  DBili  x   /  AST  38<H>  /  ALT  47  /  AlkPhos  70  -                        12.8   15.89 )-----------( 277      ( 2019 06:01 )             39.0     04-17    140  |  102  |  9   ----------------------------<  136<H>  3.0<L>   |  28  |  0.50    Ca    8.2<L>      2019 06:01  Phos  3.4     04-17  Mg     1.6     04-17    TPro  6.3  /  Alb  2.4<L>  /  TBili  0.9  /  DBili  x   /  AST  38<H>  /  ALT  47  /  AlkPhos  70  04-17     LIVER FUNCTIONS - ( 2019 06:01 )  Alb: 2.4 g/dL / Pro: 6.3 gm/dL / ALK PHOS: 70 U/L / ALT: 47 U/L / AST: 38 U/L / GGT: x           Urinalysis Basic - ( 2019 20:35 )    Color: Yellow / Appearance: Clear / S.005 / pH: x  Gluc: x / Ketone: Negative  / Bili: Negative / Urobili: Negative mg/dL   Blood: x / Protein: Negative mg/dL / Nitrite: Negative   Leuk Esterase: Negative / RBC: x / WBC x   Sq Epi: x / Non Sq Epi: x / Bacteria: x      Culture - Urine (collected 2019 20:35)  Source: .Urine None  Final Report (2019 21:42):    No growth    Culture - Blood (collected 2019 17:54)  Source: .Blood None  Preliminary Report (2019 01:03):    No growth to date.    Culture - Blood (collected 2019 17:53)  Source: .Blood None  Preliminary Report (2019 07:01):    No growth to date.      Radiology: all available radiological tests reviewed    Advanced directives addressed: full resuscitation
Date of service: 19 @ 14:03    Lying in bed in NAD  LLE pain is improving with pain meds  Has low grade fever    ROS: denies dizziness, no HA, no SOB or cough, no abdominal pain, no diarrhea or constipation; no dysuria, no legs pain, no rashes    MEDICATIONS  (STANDING):  ascorbic acid 500 milliGRAM(s) Oral daily  aspirin  chewable 81 milliGRAM(s) Oral daily  atorvastatin 10 milliGRAM(s) Oral at bedtime  cefepime   IVPB 2000 milliGRAM(s) IV Intermittent every 12 hours  dextrose 5%. 1000 milliLiter(s) (50 mL/Hr) IV Continuous <Continuous>  dextrose 50% Injectable 12.5 Gram(s) IV Push once  dextrose 50% Injectable 25 Gram(s) IV Push once  dextrose 50% Injectable 25 Gram(s) IV Push once  diltiazem    Tablet 120 milliGRAM(s) Oral two times a day  furosemide    Tablet 40 milliGRAM(s) Oral daily  gabapentin 300 milliGRAM(s) Oral daily  heparin  Injectable 5000 Unit(s) SubCutaneous every 8 hours  insulin lispro (HumaLOG) corrective regimen sliding scale   SubCutaneous three times a day before meals  metoprolol succinate  milliGRAM(s) Oral daily  montelukast 10 milliGRAM(s) Oral at bedtime  nystatin Powder 1 Application(s) Topical three times a day  uceris 9 milliGRAM(s) 9 mG Oral daily  vancomycin  IVPB 1000 milliGRAM(s) IV Intermittent every 12 hours      Vital Signs Last 24 Hrs  T(C): 37.8 (2019 10:57), Max: 38 (2019 05:46)  T(F): 100.1 (2019 10:57), Max: 100.4 (2019 05:46)  HR: 87 (2019 10:57) (87 - 113)  BP: 114/61 (2019 10:57) (114/61 - 156/78)  BP(mean): --  RR: 20 (2019 10:57) (18 - 20)  SpO2: 93% (2019 10:57) (92% - 99%)    Physical Exam:      Constitutional: frail looking  HEENT: NC/AT, EOMI, PERRLA, conjunctivae clear  Neck: supple; thyroid not palpable  Back: no tenderness  Respiratory: respiratory effort normal; clear to auscultation  Cardiovascular: S1S2 regular, no murmurs  Abdomen: soft, not tender, not distended, positive BS;   Genitourinary: no suprapubic tenderness  Lymphatic: no LN palpable  Musculoskeletal: no muscle tenderness, no joint swelling or tenderness  Extremities: 2+ pedal edema  LLE extensive erythema, edema, tenderness, warmth over left ankle, shin and calf; broken skin with scant discharge  RLE chronic stasis skin changes  Neurological/ Psychiatric: AxOx3, moving all extremities  Skin: no rashes; no palpable lesions    Labs: reviewed                        12.4   11.37 )-----------( 257      ( 2019 05:58 )             38.6     04-18    136  |  101  |  8   ----------------------------<  140<H>  3.5   |  28  |  0.50    Ca    8.4<L>      2019 05:58  Phos  3.4     04-17  Mg     1.6     04-17    TPro  6.3  /  Alb  2.4<L>  /  TBili  0.9  /  DBili  x   /  AST  38<H>  /  ALT  47  /  AlkPhos  70  04-17                        12.8   15.89 )-----------( 277      ( 2019 06:01 )             39.0     04-17    140  |  102  |  9   ----------------------------<  136<H>  3.0<L>   |  28  |  0.50    Ca    8.2<L>      2019 06:01  Phos  3.4     04-17  Mg     1.6     04-17    TPro  6.3  /  Alb  2.4<L>  /  TBili  0.9  /  DBili  x   /  AST  38<H>  /  ALT  47  /  AlkPhos  70  04-17     LIVER FUNCTIONS - ( 2019 06:01 )  Alb: 2.4 g/dL / Pro: 6.3 gm/dL / ALK PHOS: 70 U/L / ALT: 47 U/L / AST: 38 U/L / GGT: x           Urinalysis Basic - ( 2019 20:35 )    Color: Yellow / Appearance: Clear / S.005 / pH: x  Gluc: x / Ketone: Negative  / Bili: Negative / Urobili: Negative mg/dL   Blood: x / Protein: Negative mg/dL / Nitrite: Negative   Leuk Esterase: Negative / RBC: x / WBC x   Sq Epi: x / Non Sq Epi: x / Bacteria: x      Culture - Urine (collected 2019 20:35)  Source: .Urine None  Final Report (2019 21:42):    No growth    Culture - Blood (collected 2019 17:54)  Source: .Blood None  Preliminary Report (2019 01:03):    No growth to date.    Culture - Blood (collected 2019 17:53)  Source: .Blood None  Preliminary Report (2019 07:01):    No growth to date.      Radiology: all available radiological tests reviewed    Advanced directives addressed: full resuscitation
Feels wall  Rash resolving.  She states that her pain control is improving    Vital Signs Last 24 Hrs  T(C): 37 (04 May 2019 11:12), Max: 37 (03 May 2019 16:58)  T(F): 98.6 (04 May 2019 11:12), Max: 98.6 (03 May 2019 16:58)  HR: 99 (04 May 2019 11:12) (71 - 99)  BP: 109/53 (04 May 2019 11:12) (109/53 - 146/71)  BP(mean): --  RR: 17 (04 May 2019 11:12) (17 - 18)  SpO2: 94% (04 May 2019 11:12) (93% - 95%)    Exam  AAO, NAD  Non labored breathing  Abd soft, ND, NT  LLE dressing changed, well healing venous ulcers in lower leg, minimal residual cellulitis    A/P  Bilateral LE venous insufficiency with infected LLE venous ulcer  Now improved with wound care and antibiotics  -Continue daily compression dressing  -LLE elevation  -Abx regimen per ID  -Will follow
Patient is a 68y old  Female who presents with a chief complaint of LLE erythema / pain (01 May 2019 17:55) Has been on abx and with wound care. Still with severe left thigh pain.     vancomycin  IVPB 750  aspirin  chewable 81  diltiazem    Tablet 120  furosemide    Tablet 40  metoprolol succinate   vancomycin  IVPB 750  warfarin 5    Allergies    penicillin (Hives)    Intolerances        Vital Signs Last 24 Hrs  T(C): 37.7 (01 May 2019 17:04), Max: 37.7 (01 May 2019 17:04)  T(F): 99.8 (01 May 2019 17:04), Max: 99.8 (01 May 2019 17:04)  HR: 88 (01 May 2019 17:04) (88 - 114)  BP: 111/68 (01 May 2019 17:04) (111/68 - 119/64)  BP(mean): --  RR: 18 (01 May 2019 17:04) (18 - 18)  SpO2: 93% (01 May 2019 17:04) (93% - 97%)  I&O's Detail      Physical Exam:  General: NAD, resting comfortably in bed  Pulmonary: Nonlabored breathing, no respiratory distress  Cardiovascular: NSR  Abdominal: soft, NT/ND  Extremities: WWP  Pulses:   Right:                                                                          Left:  FEM [ ]2+ [ ]1+ [ ]doppler                                             FEM [ ]2+ [ ]1+ [ ]doppler    POP [ ]2+ [ ]1+ [ ]doppler                                             POP [ ]2+ [ ]1+ [ ]doppler    DP [ ]2+ [ ]1+ [ ]doppler                                                DP [ ]2+ [ ]1+ [ ]doppler  PT[ ]2+ [ ]1+ [ ]doppler                                                  PT [ ]2+ [ ]1+ [ ]doppler      LABS:                        12.2   9.64  )-----------( 344      ( 01 May 2019 10:16 )             38.2     05-01    134<L>  |  96  |  6<L>  ----------------------------<  160<H>  3.5   |  31  |  0.59    Ca    8.2<L>      01 May 2019 10:16  Mg     1.6     05-01      PT/INR - ( 01 May 2019 10:16 )   PT: 38.2 sec;   INR: 3.32 ratio           CAPILLARY BLOOD GLUCOSE      POCT Blood Glucose.: 111 mg/dL (01 May 2019 17:29)  POCT Blood Glucose.: 159 mg/dL (01 May 2019 11:18)  POCT Blood Glucose.: 169 mg/dL (01 May 2019 07:42)  POCT Blood Glucose.: 183 mg/dL (30 Apr 2019 21:17)    Radiology and Additional Studies:    Assessment and Plan: 68yFemaleCELLULITIS that has improved. Has erythema on thighs which is new, no DVT or SVT. Continue wound care, compression and leg elevation. IV abx per ID. Will follow.    -Leg elevation  -IV abx  -OK to ambulate  -Will follow
Patient was seen this AM with Dr. Calvert. States that she had burning pain in the LLE throughout the night, somewhat improved this AM. She is agreeable to ambulating with assistance, and too ace wrap dressing changes with RNs. She outright refused Unna Boot therapy (based on a bad past experience).     Objective    Vital Signs Last 24 Hrs  T(C): 37 (26 Apr 2019 11:48), Max: 37 (26 Apr 2019 11:48)  T(F): 98.6 (26 Apr 2019 11:48), Max: 98.6 (26 Apr 2019 11:48)  HR: 71 (26 Apr 2019 11:48) (67 - 78)  BP: 108/73 (26 Apr 2019 11:48) (103/64 - 138/57)  BP(mean): --  RR: 18 (26 Apr 2019 11:48) (18 - 18)  SpO2: 95% (26 Apr 2019 11:48) (93% - 100%)    Gen; NAD, AAOx3, obese  CV: RRR, normal S1 and S2  Pulm: CTAB, no wheezing  Abd: soft, non tender  Ext: BLE ace wraps removed, LLE medial ulcer with some fibrinous exudate (bluntly debrided), venous stasis skin changes and erythema, silvedine applied and both legs redressed.    Labs                        13.1   11.88 )-----------( 450      ( 25 Apr 2019 07:45 )             41.5   04-25    135  |  100  |  13  ----------------------------<  148<H>  3.7   |  32<H>  |  0.56    Ca    8.6      25 Apr 2019 07:45    PT/INR - ( 26 Apr 2019 06:55 )   PT: 29.4 sec;   INR: 2.57 ratio
Pt was seen and examined at bedside this morning with surgery team. No acute overnight events reported by nursing staff. Pt offers no new complaints at this time. Denies fever/cp/sob/n/v/d/c. Vitals stable.     T(C): 36.6 (05-05-19 @ 04:34), Max: 37 (05-04-19 @ 11:12)  HR: 72 (05-05-19 @ 04:34) (72 - 99)  BP: 110/63 (05-05-19 @ 04:34) (109/53 - 132/70)  RR: 17 (05-05-19 @ 04:34) (17 - 18)  SpO2: 95% (05-05-19 @ 04:34) (94% - 96%)    PHYSICAL EXAM:  Constitutional: NAD, GCS: 15/15  AOX3  Eyes:  WNL  ENMT:  WNL  Neck:  WNL, non tender  Back: Non tender  Respiratory: CTABL  Cardiovascular:  S1+S2+0  Gastrointestinal: Soft, ND , NT  Genitourinary:  WNL  Extremities: NV intact  Vascular:  Intact  Neurological: No focal neurological deficit,  CN, motor and sensory system grossly intact.  Skin: WNL  Musculoskeletal: WNL  Psychiatric: Grossly intact
Pt was seen and examined at bedside this morning with surgery team. No acute overnight events reported by nursing staff. Pt offers no new complaints at this time. Denies fever/cp/sob/n/v/d/c. Vitals stable.     T(C): 36.6 (05-05-19 @ 04:34), Max: 37 (05-04-19 @ 11:12)  HR: 72 (05-05-19 @ 04:34) (72 - 99)  BP: 110/63 (05-05-19 @ 04:34) (109/53 - 132/70)  RR: 17 (05-05-19 @ 04:34) (17 - 18)  SpO2: 95% (05-05-19 @ 04:34) (94% - 96%)    PHYSICAL EXAM:  Constitutional: NAD, GCS: 15/15  AOX3  Eyes:  WNL  ENMT:  WNL  Neck:  WNL, non tender  Back: Non tender  Respiratory: CTABL  Cardiovascular:  S1+S2+0  Gastrointestinal: Soft, ND , NT  Genitourinary:  WNL  Extremities: NV intact  Vascular:  Intact  Neurological: No focal neurological deficit,  CN, motor and sensory system grossly intact.  Skin: WNL  Musculoskeletal: WNL  Psychiatric: Grossly intact

## 2019-05-06 NOTE — PROGRESS NOTE ADULT - ASSESSMENT
67 y/o Female with h/o obesity, HTN, dyslipidemia, Diastolic CHF, A.fib (on coumadin), chronic venous insufficiency, DM type 2, thyroid nodule, ulcerative colitis was admitted for worsening LLE erythema / pain. Patient states she started to feel burning pain on LLE associated with LLE erythema, warm and more swollen than usual. She reports subjective fevers and feeling cold. In ER, she received vancomycin IV and aztreonam.    1. New RLE patches of erythema ?new cellulitis ?acute dermatitis ?allergic. LLE cellulitis s/p debridement. B/L LE chronic stasis dermatitis. Obesity. Allergy to PCN.  -low grade fever  -new right thigh pain and rash; new cellulitis is less likely since she is on IV abx for a prolonged time  -left leg erythema above chronic stasis is improved  -leukocytosis improved  -on vancomycin  and ceftriaxone 2 gm IV qd # 15  - most likely will continue antibiotics for now  -tolerating abx well so far; no side effects noted  -the patient has a PCN allergy history ?allergic reaction  -d/c ceftriaxone  -continue vancomycin IV for now  -pain management  -elevate legs  -monitor temps  -f/u CBC  -supportive care  2. Other issues:   -care per medicine
67 y/o Female with h/o obesity, HTN, dyslipidemia, Diastolic CHF, A.fib (on coumadin), chronic venous insufficiency, DM type 2, thyroid nodule, ulcerative colitis was admitted for worsening LLE erythema / pain. Patient states she started to feel burning pain on LLE associated with LLE erythema, warm and more swollen than usual. She reports subjective fevers and feeling cold. In ER, she received vancomycin IV and aztreonam.    1. LLE cellulitis s/p debridement. B/L LE chronic stasis dermatitis. Obesity. Allergy to PCN.  -low grade fever  -erythema above chronic stasis are is improving  -leukocytosis improving  -leg pain with better control  -obtain BC x 2   -on vancomycin  and cefepime 2 gm IV q12h # 6  -tolerating abx well so far; no side effects noted  -monitor closely in ej of PCN allergy history   -elevate legs  -continue abx coverage  -vascular evaluation appreciated  -monitor temps  -f/u CBC  -supportive care  2. Other issues:   -care per medicine
67 y/o Female with h/o obesity, HTN, dyslipidemia, Diastolic CHF, A.fib (on coumadin), chronic venous insufficiency, DM type 2, thyroid nodule, ulcerative colitis was admitted for worsening LLE erythema / pain. Patient states she started to feel burning pain on LLE associated with LLE erythema, warm and more swollen than usual. She reports subjective fevers and feeling cold. In ER, she received vancomycin IV and aztreonam.    1. LLE cellulitis s/p debridement. B/L LE chronic stasis dermatitis. Obesity. Allergy to PCN.  -low grade fever  -erythema above chronic stasis are is improving  -leukocytosis improving  -leg pain with better control  -obtain BC x 2   -on vancomycin 1 gm IV q12h and cefepime 2 gm IV q12h # 2  -tolerating abx well so far; no side effects noted  -vancomycin trough level is low; increase vancomycin to 750 mg IV q8h  -monitor closely in ej of PCN allergy history   -elevate legs  -continue abx coverage  -vascular evaluation appreciated  -monitor temps  -f/u CBC  -supportive care  2. Other issues:   -care per medicine
67 y/o Female with h/o obesity, HTN, dyslipidemia, Diastolic CHF, A.fib (on coumadin), chronic venous insufficiency, DM type 2, thyroid nodule, ulcerative colitis was admitted for worsening LLE erythema / pain. Patient states she started to feel burning pain on LLE associated with LLE erythema, warm and more swollen than usual. She reports subjective fevers and feeling cold. In ER, she received vancomycin IV and aztreonam.    1. LLE cellulitis s/p debridement. B/L LE chronic stasis dermatitis. Obesity. Allergy to PCN.  -low grade fever  -left leg pain  -erythema above chronic stasis are much improved  -leukocytosis improved  -on vancomycin  and ceftriaxone 2 gm IV qd # 14  - most likely will continue antibiotics for now  -tolerating abx well so far; no side effects noted  -monitor closely in ej of PCN allergy history   -pain management  -elevate legs  -monitor temps  -f/u CBC  -supportive care  2. Other issues:   -care per medicine
67 y/o Female with h/o obesity, HTN, dyslipidemia, Diastolic CHF, A.fib (on coumadin), chronic venous insufficiency, DM type 2, thyroid nodule, ulcerative colitis was admitted for worsening LLE erythema / pain. Patient states she started to feel burning pain on LLE associated with LLE erythema, warm and more swollen than usual. She reports subjective fevers and feeling cold. In ER, she received vancomycin IV and aztreonam.    1. LLE cellulitis. B/L LE chronic stasis dermatitis. Obesity. Allergy to PCN.  -leukocytosis improving  -leg pain with better control  -obtain BC x 2   -on vancomycin 1 gm IV q12h and cefepime 2 gm IV q12h # 2  -tolerating abx well so far; no side effects noted  -obtain vancomycin trough level  -monitor closely in ej of PCN allergy history   -elevate legs  -continue abx coverage  -monitor temps  -f/u CBC  -supportive care  2. Other issues:   -care per medicine
68 year old female with left leg cellulitis and infected venous ulcer.   Improving with wound care and compression  -Continue extremity elevation  -Continue IV abx  -Local wound care
68 year old female with left leg cellulitis and infected venous ulcer.   Improving with wound care and compression  -Continue extremity elevation  -Continue IV abx  -Local wound care by nursing, discussed with patient  - PT evaluation, ambulation encouraged    Discussed with Dr. Pereira
68 year old female with resolving left leg cellulitis and improving left leg venous ulcer. Her leg pain is unlikely secondary to local infection. Her left leg has significantly improved. There is minimal evidence of residual cellulitis.  -Continue daily compression dressing  -PT  -Optimize pain control  -No vascular contraindication to discharge  -Will continue to follow
69 y/o Female with h/o obesity, HTN, dyslipidemia, Diastolic CHF, A.fib (on coumadin), chronic venous insufficiency, DM type 2, thyroid nodule, ulcerative colitis was admitted for worsening LLE erythema / pain. Patient states she started to feel burning pain on LLE associated with LLE erythema, warm and more swollen than usual. She reports subjective fevers and feeling cold. In ER, she received vancomycin IV and aztreonam.    1. LLE cellulitis s/p debridement. B/L LE chronic stasis dermatitis. Obesity. Allergy to PCN.  -low grade fever  -erythema above chronic stasis are is improving  -leukocytosis improving  -leg pain with better control  -obtain BC x 2   -on vancomycin  and cefepime 2 gm IV q12h # 10  - most likely will continue antibiotics through weekend  -tolerating abx well so far; no side effects noted  -monitor closely in ej of PCN allergy history   -elevate legs  -vascular evaluation appreciated  -monitor temps  -f/u CBC  -supportive care  2. Other issues:   -care per medicine
69 y/o Female with h/o obesity, HTN, dyslipidemia, Diastolic CHF, A.fib (on coumadin), chronic venous insufficiency, DM type 2, thyroid nodule, ulcerative colitis was admitted for worsening LLE erythema / pain. Patient states she started to feel burning pain on LLE associated with LLE erythema, warm and more swollen than usual. She reports subjective fevers and feeling cold. In ER, she received vancomycin IV and aztreonam.    1. LLE cellulitis s/p debridement. B/L LE chronic stasis dermatitis. Obesity. Allergy to PCN.  -low grade fever  -left leg pain  -erythema above chronic stasis are much improved  -leukocytosis improved  -on vancomycin  and ceftriaxone 2 gm IV qd # 13  - most likely will continue antibiotics for now  -tolerating abx well so far; no side effects noted  -monitor closely in ej of PCN allergy history   -elevate legs  -monitor temps  -f/u CBC  -supportive care  2. Other issues:   -care per medicine
69 y/o Female with h/o obesity, HTN, dyslipidemia, Diastolic CHF, A.fib (on coumadin), chronic venous insufficiency, DM type 2, thyroid nodule, ulcerative colitis was admitted for worsening LLE erythema / pain. Patient states she started to feel burning pain on LLE associated with LLE erythema, warm and more swollen than usual. She reports subjective fevers and feeling cold. In ER, she received vancomycin IV and aztreonam.    1. New RLE patches of erythema probable acute dermatitis ?allergic. LLE cellulitis s/p debridement. B/L LE chronic stasis dermatitis. Obesity. Allergy to PCN.  -low grade fever  -new right thigh pain and rash seems to be improving on steroids  -left leg erythema above chronic stasis is improved  -leukocytosis resolved  -on vancomycin IV # 16  -tolerating abx well so far; no side effects noted  -s/p ceftriaxone   -continue vancomycin IV for now  -pain management  -elevate legs  -monitor temps  -f/u CBC  -supportive care  2. Other issues:   -care per medicine
69 y/o Female with h/o obesity, HTN, dyslipidemia, Diastolic CHF, A.fib (on coumadin), chronic venous insufficiency, DM type 2, thyroid nodule, ulcerative colitis was admitted for worsening LLE erythema / pain. Patient states she started to feel burning pain on LLE associated with LLE erythema, warm and more swollen than usual. She reports subjective fevers and feeling cold. In ER, she received vancomycin IV and aztreonam.    1. RLE patches of erythema probable acute dermatitis resolving. LLE cellulitis s/p debridement. B/L LE chronic stasis dermatitis. Obesity. Allergy to PCN.  -low grade fever resolving  -new right thigh pain and rash are improving on steroids  -left leg erythema above chronic stasis is improved  -leukocytosis likely due to steroids  -on vancomycin IV # 17  -tolerating abx well so far; no side effects noted  -s/p ceftriaxone   -change abx to doxycycline 100 mg PO q12h for a few more days  -pain management  -elevate legs  -monitor temps  -f/u CBC  -supportive care  2. Other issues:   -care per medicine
69 yo F with BLE venous stasis disease and LLE medial calf ulcer s/p daily dressing changes. Patient is refusing unna boot therapy.    Plan:  - continue daily dressing changes per RN. May need assistance with home wound care  - ambulation encouraged  - leg elevation  - cleared for discharge from a vascular surgery standpoint  - patient can follow up with Dr. Yoo in clinic in one week    Patient examined and plan discussed with Dr. Calvert
A/P      Pt is a 68 year old female with Bilateral LE venous insufficiency with infected LLE venous ulcer    -monitor vitals  -Pain control prn  -Now improved with wound care and antibiotics  -Continue daily compression dressing  -LLE elevation  -Continue Abx regimen per ID  -Vascular surgery will continue to follow  -Continue primary care per medical team    Will discuss plan with Dr. Yoo
A/P      Pt is a 68 year old female with Bilateral LE venous insufficiency with infected LLE venous ulcer    -monitor vitals  -Pain control prn  -Now improved with wound care and antibiotics- dressing changed at bedside last evening   -Continue daily compression dressing  -LLE elevation  -Continue Abx regimen per ID  -Vascular surgery will continue to follow  -Continue primary care per medical team    Will discuss plan with Dr. Yoo
will continue to follow while in patient and do daily dressing changes and debridement prn.

## 2019-05-06 NOTE — PROGRESS NOTE ADULT - REASON FOR ADMISSION
LLE erythema / pain

## 2019-05-06 NOTE — DISCHARGE NOTE PROVIDER - CARE PROVIDER_API CALL
Justo Mayer (MD)  Cardiovascular Disease; Internal Medicine  [address], [address]  [place] [address]  Phone: [phone]  Fax: [phone]  Follow Up Time:     Naila,   Phone: (   )    -  Fax: (   )    -  Follow Up Time:

## 2019-05-06 NOTE — PROGRESS NOTE ADULT - PROVIDER SPECIALTY LIST ADULT
Hospitalist
Infectious Disease
Vascular Surgery
Hospitalist
Hospitalist
Infectious Disease

## 2019-05-09 DIAGNOSIS — Z79.84 LONG TERM (CURRENT) USE OF ORAL HYPOGLYCEMIC DRUGS: ICD-10-CM

## 2019-05-09 DIAGNOSIS — L97.229 NON-PRESSURE CHRONIC ULCER OF LEFT CALF WITH UNSPECIFIED SEVERITY: ICD-10-CM

## 2019-05-09 DIAGNOSIS — E87.6 HYPOKALEMIA: ICD-10-CM

## 2019-05-09 DIAGNOSIS — I50.32 CHRONIC DIASTOLIC (CONGESTIVE) HEART FAILURE: ICD-10-CM

## 2019-05-09 DIAGNOSIS — E11.51 TYPE 2 DIABETES MELLITUS WITH DIABETIC PERIPHERAL ANGIOPATHY WITHOUT GANGRENE: ICD-10-CM

## 2019-05-09 DIAGNOSIS — Z79.01 LONG TERM (CURRENT) USE OF ANTICOAGULANTS: ICD-10-CM

## 2019-05-09 DIAGNOSIS — Z79.82 LONG TERM (CURRENT) USE OF ASPIRIN: ICD-10-CM

## 2019-05-09 DIAGNOSIS — I83.222 VARICOSE VEINS OF LEFT LOWER EXTREMITY WITH BOTH ULCER OF CALF AND INFLAMMATION: ICD-10-CM

## 2019-05-09 DIAGNOSIS — I48.91 UNSPECIFIED ATRIAL FIBRILLATION: ICD-10-CM

## 2019-05-09 DIAGNOSIS — A41.9 SEPSIS, UNSPECIFIED ORGANISM: ICD-10-CM

## 2019-05-09 DIAGNOSIS — I11.0 HYPERTENSIVE HEART DISEASE WITH HEART FAILURE: ICD-10-CM

## 2019-05-09 DIAGNOSIS — E66.01 MORBID (SEVERE) OBESITY DUE TO EXCESS CALORIES: ICD-10-CM

## 2019-05-09 DIAGNOSIS — K51.90 ULCERATIVE COLITIS, UNSPECIFIED, WITHOUT COMPLICATIONS: ICD-10-CM

## 2019-05-09 DIAGNOSIS — L03.116 CELLULITIS OF LEFT LOWER LIMB: ICD-10-CM

## 2019-05-09 DIAGNOSIS — T36.95XA ADVERSE EFFECT OF UNSPECIFIED SYSTEMIC ANTIBIOTIC, INITIAL ENCOUNTER: ICD-10-CM

## 2019-05-09 DIAGNOSIS — I89.0 LYMPHEDEMA, NOT ELSEWHERE CLASSIFIED: ICD-10-CM

## 2019-05-09 DIAGNOSIS — L53.9 ERYTHEMATOUS CONDITION, UNSPECIFIED: ICD-10-CM

## 2019-05-09 DIAGNOSIS — I83.11 VARICOSE VEINS OF RIGHT LOWER EXTREMITY WITH INFLAMMATION: ICD-10-CM

## 2019-05-09 DIAGNOSIS — F41.9 ANXIETY DISORDER, UNSPECIFIED: ICD-10-CM

## 2019-05-09 DIAGNOSIS — Z87.891 PERSONAL HISTORY OF NICOTINE DEPENDENCE: ICD-10-CM

## 2019-05-09 DIAGNOSIS — I87.2 VENOUS INSUFFICIENCY (CHRONIC) (PERIPHERAL): ICD-10-CM

## 2019-05-10 ENCOUNTER — APPOINTMENT (OUTPATIENT)
Dept: VASCULAR SURGERY | Facility: CLINIC | Age: 69
End: 2019-05-10
Payer: MEDICARE

## 2019-05-10 PROCEDURE — 99213 OFFICE O/P EST LOW 20 MIN: CPT

## 2019-05-12 ENCOUNTER — INPATIENT (INPATIENT)
Facility: HOSPITAL | Age: 69
LOS: 10 days | Discharge: HOME | DRG: 253 | End: 2019-05-23
Attending: SURGERY | Admitting: SURGERY
Payer: MEDICARE

## 2019-05-12 VITALS
HEART RATE: 100 BPM | DIASTOLIC BLOOD PRESSURE: 80 MMHG | TEMPERATURE: 98 F | WEIGHT: 270.07 LBS | RESPIRATION RATE: 16 BRPM | OXYGEN SATURATION: 98 % | SYSTOLIC BLOOD PRESSURE: 124 MMHG | HEIGHT: 70 IN

## 2019-05-12 DIAGNOSIS — K95.09 OTHER COMPLICATIONS OF GASTRIC BAND PROCEDURE: Chronic | ICD-10-CM

## 2019-05-12 DIAGNOSIS — Z98.890 OTHER SPECIFIED POSTPROCEDURAL STATES: Chronic | ICD-10-CM

## 2019-05-12 DIAGNOSIS — L03.116 CELLULITIS OF LEFT LOWER LIMB: ICD-10-CM

## 2019-05-12 PROBLEM — L03.90 CELLULITIS, UNSPECIFIED: Chronic | Status: ACTIVE | Noted: 2019-04-17

## 2019-05-12 LAB
ALBUMIN SERPL ELPH-MCNC: 2.7 G/DL — LOW (ref 3.3–5.2)
ALP SERPL-CCNC: 61 U/L — SIGNIFICANT CHANGE UP (ref 40–120)
ALT FLD-CCNC: 10 U/L — SIGNIFICANT CHANGE UP
ANION GAP SERPL CALC-SCNC: 13 MMOL/L — SIGNIFICANT CHANGE UP (ref 5–17)
APTT BLD: 41.3 SEC — HIGH (ref 27.5–36.3)
AST SERPL-CCNC: 21 U/L — SIGNIFICANT CHANGE UP
BASOPHILS # BLD AUTO: 0 K/UL — SIGNIFICANT CHANGE UP (ref 0–0.2)
BASOPHILS NFR BLD AUTO: 0.3 % — SIGNIFICANT CHANGE UP (ref 0–2)
BILIRUB SERPL-MCNC: 0.4 MG/DL — SIGNIFICANT CHANGE UP (ref 0.4–2)
BLD GP AB SCN SERPL QL: SIGNIFICANT CHANGE UP
BUN SERPL-MCNC: 10 MG/DL — SIGNIFICANT CHANGE UP (ref 8–20)
CALCIUM SERPL-MCNC: 8.6 MG/DL — SIGNIFICANT CHANGE UP (ref 8.6–10.2)
CHLORIDE SERPL-SCNC: 97 MMOL/L — LOW (ref 98–107)
CO2 SERPL-SCNC: 24 MMOL/L — SIGNIFICANT CHANGE UP (ref 22–29)
CREAT SERPL-MCNC: 0.48 MG/DL — LOW (ref 0.5–1.3)
CRP SERPL-MCNC: 7.09 MG/DL — HIGH (ref 0–0.4)
EOSINOPHIL # BLD AUTO: 0.2 K/UL — SIGNIFICANT CHANGE UP (ref 0–0.5)
EOSINOPHIL NFR BLD AUTO: 1.9 % — SIGNIFICANT CHANGE UP (ref 0–6)
ERYTHROCYTE [SEDIMENTATION RATE] IN BLOOD: 51 MM/HR — HIGH (ref 0–20)
GLUCOSE SERPL-MCNC: 110 MG/DL — SIGNIFICANT CHANGE UP (ref 70–115)
HCT VFR BLD CALC: 37.8 % — SIGNIFICANT CHANGE UP (ref 37–47)
HGB BLD-MCNC: 12.2 G/DL — SIGNIFICANT CHANGE UP (ref 12–16)
INR BLD: 2.67 RATIO — HIGH (ref 0.88–1.16)
LYMPHOCYTES # BLD AUTO: 1.6 K/UL — SIGNIFICANT CHANGE UP (ref 1–4.8)
LYMPHOCYTES # BLD AUTO: 16 % — LOW (ref 20–55)
MCHC RBC-ENTMCNC: 30.2 PG — SIGNIFICANT CHANGE UP (ref 27–31)
MCHC RBC-ENTMCNC: 32.3 G/DL — SIGNIFICANT CHANGE UP (ref 32–36)
MCV RBC AUTO: 93.6 FL — SIGNIFICANT CHANGE UP (ref 81–99)
MONOCYTES # BLD AUTO: 0.8 K/UL — SIGNIFICANT CHANGE UP (ref 0–0.8)
MONOCYTES NFR BLD AUTO: 7.8 % — SIGNIFICANT CHANGE UP (ref 3–10)
NEUTROPHILS # BLD AUTO: 7.2 K/UL — SIGNIFICANT CHANGE UP (ref 1.8–8)
NEUTROPHILS NFR BLD AUTO: 73.2 % — HIGH (ref 37–73)
PLATELET # BLD AUTO: 464 K/UL — HIGH (ref 150–400)
POTASSIUM SERPL-MCNC: 5.6 MMOL/L — HIGH (ref 3.5–5.3)
POTASSIUM SERPL-SCNC: 5.6 MMOL/L — HIGH (ref 3.5–5.3)
PROT SERPL-MCNC: 7.5 G/DL — SIGNIFICANT CHANGE UP (ref 6.6–8.7)
PROTHROM AB SERPL-ACNC: 31.6 SEC — HIGH (ref 10–12.9)
RBC # BLD: 4.04 M/UL — LOW (ref 4.4–5.2)
RBC # FLD: 15 % — SIGNIFICANT CHANGE UP (ref 11–15.6)
SODIUM SERPL-SCNC: 134 MMOL/L — LOW (ref 135–145)
TYPE + AB SCN PNL BLD: SIGNIFICANT CHANGE UP
WBC # BLD: 9.9 K/UL — SIGNIFICANT CHANGE UP (ref 4.8–10.8)
WBC # FLD AUTO: 9.9 K/UL — SIGNIFICANT CHANGE UP (ref 4.8–10.8)

## 2019-05-12 PROCEDURE — 99285 EMERGENCY DEPT VISIT HI MDM: CPT

## 2019-05-12 PROCEDURE — 93970 EXTREMITY STUDY: CPT | Mod: 26

## 2019-05-12 PROCEDURE — 71045 X-RAY EXAM CHEST 1 VIEW: CPT | Mod: 26

## 2019-05-12 RX ORDER — MONTELUKAST 4 MG/1
10 TABLET, CHEWABLE ORAL DAILY
Refills: 0 | Status: DISCONTINUED | OUTPATIENT
Start: 2019-05-12 | End: 2019-05-16

## 2019-05-12 RX ORDER — DILTIAZEM HCL 120 MG
120 CAPSULE, EXT RELEASE 24 HR ORAL DAILY
Refills: 0 | Status: DISCONTINUED | OUTPATIENT
Start: 2019-05-12 | End: 2019-05-16

## 2019-05-12 RX ORDER — VANCOMYCIN HCL 1 G
1000 VIAL (EA) INTRAVENOUS ONCE
Refills: 0 | Status: COMPLETED | OUTPATIENT
Start: 2019-05-12 | End: 2019-05-12

## 2019-05-12 RX ORDER — INSULIN LISPRO 100/ML
VIAL (ML) SUBCUTANEOUS
Refills: 0 | Status: DISCONTINUED | OUTPATIENT
Start: 2019-05-12 | End: 2019-05-16

## 2019-05-12 RX ORDER — METOPROLOL TARTRATE 50 MG
100 TABLET ORAL DAILY
Refills: 0 | Status: DISCONTINUED | OUTPATIENT
Start: 2019-05-12 | End: 2019-05-16

## 2019-05-12 RX ORDER — ASPIRIN/CALCIUM CARB/MAGNESIUM 324 MG
81 TABLET ORAL DAILY
Refills: 0 | Status: DISCONTINUED | OUTPATIENT
Start: 2019-05-12 | End: 2019-05-16

## 2019-05-12 RX ORDER — VANCOMYCIN HCL 1 G
VIAL (EA) INTRAVENOUS
Refills: 0 | Status: DISCONTINUED | OUTPATIENT
Start: 2019-05-12 | End: 2019-05-16

## 2019-05-12 RX ORDER — DEXTROSE 50 % IN WATER 50 %
25 SYRINGE (ML) INTRAVENOUS ONCE
Refills: 0 | Status: DISCONTINUED | OUTPATIENT
Start: 2019-05-12 | End: 2019-05-16

## 2019-05-12 RX ORDER — DEXTROSE 50 % IN WATER 50 %
15 SYRINGE (ML) INTRAVENOUS ONCE
Refills: 0 | Status: DISCONTINUED | OUTPATIENT
Start: 2019-05-12 | End: 2019-05-16

## 2019-05-12 RX ORDER — HEPARIN SODIUM 5000 [USP'U]/ML
INJECTION INTRAVENOUS; SUBCUTANEOUS
Qty: 25000 | Refills: 0 | Status: DISCONTINUED | OUTPATIENT
Start: 2019-05-12 | End: 2019-05-16

## 2019-05-12 RX ORDER — ONDANSETRON 8 MG/1
4 TABLET, FILM COATED ORAL ONCE
Refills: 0 | Status: COMPLETED | OUTPATIENT
Start: 2019-05-12 | End: 2019-05-12

## 2019-05-12 RX ORDER — HYDROMORPHONE HYDROCHLORIDE 2 MG/ML
1 INJECTION INTRAMUSCULAR; INTRAVENOUS; SUBCUTANEOUS ONCE
Refills: 0 | Status: DISCONTINUED | OUTPATIENT
Start: 2019-05-12 | End: 2019-05-12

## 2019-05-12 RX ORDER — VANCOMYCIN HCL 1 G
VIAL (EA) INTRAVENOUS
Refills: 0 | Status: DISCONTINUED | OUTPATIENT
Start: 2019-05-12 | End: 2019-05-12

## 2019-05-12 RX ORDER — FUROSEMIDE 40 MG
40 TABLET ORAL
Refills: 0 | Status: DISCONTINUED | OUTPATIENT
Start: 2019-05-12 | End: 2019-05-16

## 2019-05-12 RX ORDER — SIMVASTATIN 20 MG/1
10 TABLET, FILM COATED ORAL AT BEDTIME
Refills: 0 | Status: DISCONTINUED | OUTPATIENT
Start: 2019-05-12 | End: 2019-05-16

## 2019-05-12 RX ORDER — DEXTROSE 50 % IN WATER 50 %
12.5 SYRINGE (ML) INTRAVENOUS ONCE
Refills: 0 | Status: DISCONTINUED | OUTPATIENT
Start: 2019-05-12 | End: 2019-05-16

## 2019-05-12 RX ORDER — SODIUM CHLORIDE 9 MG/ML
1000 INJECTION, SOLUTION INTRAVENOUS
Refills: 0 | Status: DISCONTINUED | OUTPATIENT
Start: 2019-05-12 | End: 2019-05-16

## 2019-05-12 RX ORDER — HYDROMORPHONE HYDROCHLORIDE 2 MG/ML
1 INJECTION INTRAMUSCULAR; INTRAVENOUS; SUBCUTANEOUS ONCE
Refills: 0 | Status: DISCONTINUED | OUTPATIENT
Start: 2019-05-12 | End: 2019-05-13

## 2019-05-12 RX ORDER — HYDROMORPHONE HYDROCHLORIDE 2 MG/ML
1 INJECTION INTRAMUSCULAR; INTRAVENOUS; SUBCUTANEOUS EVERY 4 HOURS
Refills: 0 | Status: DISCONTINUED | OUTPATIENT
Start: 2019-05-12 | End: 2019-05-15

## 2019-05-12 RX ORDER — HYDROMORPHONE HYDROCHLORIDE 2 MG/ML
0.5 INJECTION INTRAMUSCULAR; INTRAVENOUS; SUBCUTANEOUS EVERY 4 HOURS
Refills: 0 | Status: DISCONTINUED | OUTPATIENT
Start: 2019-05-12 | End: 2019-05-15

## 2019-05-12 RX ORDER — GLUCAGON INJECTION, SOLUTION 0.5 MG/.1ML
1 INJECTION, SOLUTION SUBCUTANEOUS ONCE
Refills: 0 | Status: DISCONTINUED | OUTPATIENT
Start: 2019-05-12 | End: 2019-05-16

## 2019-05-12 RX ORDER — GABAPENTIN 400 MG/1
300 CAPSULE ORAL THREE TIMES A DAY
Refills: 0 | Status: DISCONTINUED | OUTPATIENT
Start: 2019-05-12 | End: 2019-05-16

## 2019-05-12 RX ORDER — VANCOMYCIN HCL 1 G
1000 VIAL (EA) INTRAVENOUS EVERY 12 HOURS
Refills: 0 | Status: DISCONTINUED | OUTPATIENT
Start: 2019-05-13 | End: 2019-05-16

## 2019-05-12 RX ADMIN — HEPARIN SODIUM 2200 UNIT(S)/HR: 5000 INJECTION INTRAVENOUS; SUBCUTANEOUS at 21:58

## 2019-05-12 RX ADMIN — Medication 81 MILLIGRAM(S): at 23:18

## 2019-05-12 RX ADMIN — HYDROMORPHONE HYDROCHLORIDE 1 MILLIGRAM(S): 2 INJECTION INTRAMUSCULAR; INTRAVENOUS; SUBCUTANEOUS at 21:58

## 2019-05-12 RX ADMIN — HYDROMORPHONE HYDROCHLORIDE 0.5 MILLIGRAM(S): 2 INJECTION INTRAMUSCULAR; INTRAVENOUS; SUBCUTANEOUS at 23:49

## 2019-05-12 RX ADMIN — HYDROMORPHONE HYDROCHLORIDE 1 MILLIGRAM(S): 2 INJECTION INTRAMUSCULAR; INTRAVENOUS; SUBCUTANEOUS at 19:57

## 2019-05-12 RX ADMIN — GABAPENTIN 300 MILLIGRAM(S): 400 CAPSULE ORAL at 23:20

## 2019-05-12 RX ADMIN — HYDROMORPHONE HYDROCHLORIDE 0.5 MILLIGRAM(S): 2 INJECTION INTRAMUSCULAR; INTRAVENOUS; SUBCUTANEOUS at 23:29

## 2019-05-12 RX ADMIN — Medication 250 MILLIGRAM(S): at 23:19

## 2019-05-12 RX ADMIN — SIMVASTATIN 10 MILLIGRAM(S): 20 TABLET, FILM COATED ORAL at 23:20

## 2019-05-12 RX ADMIN — HYDROMORPHONE HYDROCHLORIDE 1 MILLIGRAM(S): 2 INJECTION INTRAMUSCULAR; INTRAVENOUS; SUBCUTANEOUS at 19:42

## 2019-05-12 RX ADMIN — HYDROMORPHONE HYDROCHLORIDE 1 MILLIGRAM(S): 2 INJECTION INTRAMUSCULAR; INTRAVENOUS; SUBCUTANEOUS at 22:33

## 2019-05-12 RX ADMIN — ONDANSETRON 4 MILLIGRAM(S): 8 TABLET, FILM COATED ORAL at 19:41

## 2019-05-12 NOTE — ED PROVIDER NOTE - PHYSICAL EXAMINATION
VITAL SIGNS: I have reviewed nursing notes and confirm.  CONSTITUTIONAL: Well-developed; well-nourished; in no acute distress.  SKIN: Skin exam is warm  no acute rash.  HEAD: Normocephalic; atraumatic.  EYES: PERRL, EOM intact; conjunctiva and sclera clear.  ENT: No nasal discharge; airway clear. Throat clear.  NECK: Supple; non tender.    CARD: S1, S2 normal; no murmurs, gallops, or rubs. (+) irregular rhythm   RESP: No wheezes,  no rales or rhonchi.   ABD:  soft; non-distended; non-tender;   EXT: (+) diffuse erythema and swelling to knee (+)multiple superfical ulceration with purulent and serous drainage from left leg (+) 2+ peripheral pulse   NEURO: Alert, oriented. Grossly unremarkable. No focal deficits. no facial droop, moves all extremities,    PSYCH: Cooperative, appropriate.

## 2019-05-12 NOTE — ED ADULT NURSE NOTE - CHIEF COMPLAINT QUOTE
"I called my vascular surgeon Reji Yoo for all the pain in my legs, I have cellulitis on both legs but open sores on my left leg.  He told me I need a procedure. I was in [facility] for 3 weeks  about 2 weeks ago he told me to come here b/c this is his hospital. " Pt  A& Ox4. has redness right leg with "orange rind" appearance and left lower extremity is wrapped, was done by vascular MD.

## 2019-05-12 NOTE — H&P ADULT - HISTORY OF PRESENT ILLNESS
67 y/o Female with h/o obesity, HTN, dyslipidemia, Diastolic CHF, A.fib (on coumadin), chronic venous insufficiency, DM type 2, thyroid nodule, ulcerative colitis was admitted for worsening LLE erythema / pain. Patient has a history of severe chronic venous insufficiency with multiple previous admissions for bilateral leg ulcers and cellulitis. Pt is well known to vascular surgery. She previously hospitalized for 3 weeks ago at [facility] for similar symptoms. She was discharged one week ago. She presents with 2 days of increasing left leg swelling, weeping and burning pain to right lower extremity. She denies shortness of breath, chest pain, abdominal pain, nausea, fever or chills.

## 2019-05-12 NOTE — H&P ADULT - NSHPPOADEEPVENOUSTHROMB_GEN_A_CORE
TRANSFER - OUT REPORT:    Verbal report given to BALJIT Mcdaniel on Jannet Pascual  being transferred to [facility] for routine progression of care       Report consisted of patients Situation, Background, Assessment and   Recommendations(SBAR). Information from the following report(s) SBAR, Kardex, Intake/Output, Recent Results and Cardiac Rhythm NSR was reviewed with the receiving nurse. Lines:   Peripheral IV 48/97/42 Right Basilic (Active)   Site Assessment Clean, dry, & intact 12/26/2017  8:30 PM   Phlebitis Assessment 0 12/26/2017  8:30 PM   Infiltration Assessment 0 12/26/2017  8:30 PM   Dressing Status Clean, dry, & intact 12/26/2017  8:30 PM   Dressing Type Transparent;Tape 12/26/2017  8:30 PM   Hub Color/Line Status Blue;Capped 12/26/2017  8:30 PM   Action Taken Open ports on tubing capped 12/26/2017  8:30 PM   Alcohol Cap Used No 12/26/2017  8:30 PM        Opportunity for questions and clarification was provided.       Patient transported with:   Patient-specific medications from Pharmacy  Registered Nurse no

## 2019-05-12 NOTE — H&P ADULT - NSICDXPASTSURGICALHX_GEN_ALL_CORE_FT
PAST SURGICAL HISTORY:  H/O colonoscopy     History of esophagogastroduodenoscopy (EGD)     Other complications of gastric band procedure     S/P left knee arthroscopy     Status post medial meniscus repair

## 2019-05-12 NOTE — H&P ADULT - NSICDXFAMILYHX_GEN_ALL_CORE_FT
FAMILY HISTORY:  Family history of breast cancer in mother  Family history of diabetes mellitus in mother

## 2019-05-12 NOTE — ED STATDOCS - CLINICAL SUMMARY MEDICAL DECISION MAKING FREE TEXT BOX
Focused eval, protocol orders entered. Pt to be moved to main ED for complete evaluation by another provider. Seizure

## 2019-05-12 NOTE — ED PROVIDER NOTE - PROGRESS NOTE DETAILS
vascular resident called and request admission to Dr. Yoo vascular resident called and request admission to Dr. Yoo. Will defer antibiotic to vascular team.

## 2019-05-12 NOTE — ED PROVIDER NOTE - NS ED ROS FT
Review of Systems  •	CONSTITUTIONAL - no  fever, no diaphoresis, no weight change  •	SKIN - no rash  •	HEMATOLOGIC - no bleeding, no bruising  •	EYES - no eye pain, no blurred vision  •	ENT - no change in hearing, no pain  •	RESPIRATORY - no shortness of breath, no cough  •	CARDIAC - no chest pain, no palpitations  •	GI - no abd pain, (+) nausea, no vomiting, no diarrhea, no constipation, no bleeding  •	GENITO-URINARY - no discharge, no dysuria; no hematuria,   •	ENDO - no polydypsia, no polyurea, no heat/no cold intolerance  •	MUSCULOSKELETAL - (+) left knee joint pain, (+) swelling, (+) redness  •	NEUROLOGIC - no weakness, no headache, no anesthesia, no paresthesias  •	PSYCH - no anxiety, non suicidal, non homicidal, no hallucination, no depression

## 2019-05-12 NOTE — H&P ADULT - NSICDXPASTMEDICALHX_GEN_ALL_CORE_FT
PAST MEDICAL HISTORY:  Atrial fibrillation     Cellulitis     Congestive heart failure     Diabetes mellitus type II, controlled     Dyspnea     HTN (hypertension)     Morbid obesity with BMI of 40.0-44.9, adult     Neuropathy     Peripheral venous insufficiency     Thyroid nodule

## 2019-05-12 NOTE — ED ADULT NURSE NOTE - OBJECTIVE STATEMENT
Pt a&ox3 c/o BLE edema and cellulitis with open wounds to Left lower leg and foot. Pt reports seeing MD Yoo for vascular care and MD told her to come to [facility]. Reports recent travel to [facility] 2x weeks ago and pain and swelling has worsened since then. Serosanguinous fluid weeping from LLE, purulent drainage noted. +pulses to BLE. MAEx4, ambulates with walker at baseline. NSR on cm. VSS per fs. #22G IV placed to Right bicep, bloodwork drawn and sent to lab, results pending. RR even and unlabored, Lungs CTA day. Pt reports burning sensation to left leg d/t wound, MD made aware. Safety maintained, updated on POC, verbalized understanding. Rest promoted, call bell in reach, appears in no apparent distress @ this time

## 2019-05-12 NOTE — ED ADULT NURSE NOTE - NSIMPLEMENTINTERV_GEN_ALL_ED
Implemented All Fall Risk Interventions:  Noti to call system. Call bell, personal items and telephone within reach. Instruct patient to call for assistance. Room bathroom lighting operational. Non-slip footwear when patient is off stretcher. Physically safe environment: no spills, clutter or unnecessary equipment. Stretcher in lowest position, wheels locked, appropriate side rails in place. Provide visual cue, wrist band, yellow gown, etc. Monitor gait and stability. Monitor for mental status changes and reorient to person, place, and time. Review medications for side effects contributing to fall risk. Reinforce activity limits and safety measures with patient and family.

## 2019-05-12 NOTE — ED ADULT TRIAGE NOTE - CHIEF COMPLAINT QUOTE
"I called my vascular surgeon Reji Yoo for all the pain in my legs, I have cellulitis on both legs but open sores on my left leg.  He told me I need a procedure. I was in [facility] for 3 weeks  about 2 weeks ago he told me to come here b/c this is his hospital. " Pt  A& Ox4. has redness right leg with "orange rind" appearance and left lower extremity is wrapped, was done by vascular MD.

## 2019-05-12 NOTE — ED STATDOCS - PROGRESS NOTE DETAILS
69 y/o F pt with hx of Afib, CHF, DM, HTN, peripheral venous insufficiency and cellulitis, presents to ED with  and sons c/o worsening cellulitis, more on LLE with assoc. pain, redness, and swelling. Pt reports she was seen by Lopez Yoo today for same complaint; pt was sent into ED for possible procedure. States she was seen at [facility] about 3 weeks ago, for chronic wounds with underlying vascular etiology which became infected, and septic. No further complaints at this time.   Cardio: Naila

## 2019-05-12 NOTE — ED PROVIDER NOTE - CLINICAL SUMMARY MEDICAL DECISION MAKING FREE TEXT BOX
67 yo F hx significant for PVD and cellulitis p/w with worsening cellulitis of left leg, will admit to vascular surgery

## 2019-05-12 NOTE — H&P ADULT - NSHPPHYSICALEXAM_GEN_ALL_CORE
General: No acute distress  HEENT: Moist mucus membranes  Chest: nontender  CV: S1/S2  Resp: CTABL  Abd: soft, nontender  Vasc; Doppler signals present to b/l lower extremities  Extremities: LLE edematous. b/l lower extremities erythematous. Left leg with chronic statis dermatitis and lipodermatosclerosis. The is significant tenderness, blanching erythema and blistering of the skin around her medial malleolus due to overlying cellulitis. Right foot with well healing venous ulcer

## 2019-05-12 NOTE — ED ADULT NURSE REASSESSMENT NOTE - NS ED NURSE REASSESS COMMENT FT1
Pt provided with meal tray per diet orders, tolerating po intake @ this time. Resting comfortably on stretcher, repositioned for comfort, new sheet and linen provided. Safety maintained, call bell in reach, pending transfer to CDU @ this time

## 2019-05-12 NOTE — H&P ADULT - ASSESSMENT
69yo female presents with chronic statis dermatitis and lipodermatosclerosis  - hemodynamically stable      Plan;  -Admit to vascular surgery under Dr. Carlson  -Wounds dressed with petroleum gauze, kerlix, ACE  -Elevation of leg  -Pain control PRN  -Restart home meds. Heparin gtt will be used anticoagulation  -IV antibiotics  -Will need debridement of LLE wounds on this admission 67yo female presents with chronic statis dermatitis and lipodermatosclerosis  - hemodynamically stable      Plan;  -Admit to vascular surgery under Dr. Carlson  -Wounds dressed with petroleum gauze, kerlix, ACE  -Elevation of leg  -Pain control PRN  -Restart home meds. Heparin gtt will be used anticoagulation  -IV antibiotics  -f/u on duplex of lower extremities  -Will need debridement of LLE wounds on this admission

## 2019-05-13 LAB
ANION GAP SERPL CALC-SCNC: 12 MMOL/L — SIGNIFICANT CHANGE UP (ref 5–17)
APTT BLD: 119 SEC — HIGH (ref 27.5–36.3)
APTT BLD: 79 SEC — HIGH (ref 27.5–36.3)
APTT BLD: 89.4 SEC — HIGH (ref 27.5–36.3)
BASOPHILS # BLD AUTO: 0 K/UL — SIGNIFICANT CHANGE UP (ref 0–0.2)
BASOPHILS NFR BLD AUTO: 0.4 % — SIGNIFICANT CHANGE UP (ref 0–2)
BUN SERPL-MCNC: 8 MG/DL — SIGNIFICANT CHANGE UP (ref 8–20)
CALCIUM SERPL-MCNC: 8.2 MG/DL — LOW (ref 8.6–10.2)
CHLORIDE SERPL-SCNC: 96 MMOL/L — LOW (ref 98–107)
CO2 SERPL-SCNC: 26 MMOL/L — SIGNIFICANT CHANGE UP (ref 22–29)
CREAT SERPL-MCNC: 0.51 MG/DL — SIGNIFICANT CHANGE UP (ref 0.5–1.3)
EOSINOPHIL # BLD AUTO: 0.3 K/UL — SIGNIFICANT CHANGE UP (ref 0–0.5)
EOSINOPHIL NFR BLD AUTO: 2.5 % — SIGNIFICANT CHANGE UP (ref 0–6)
GLUCOSE SERPL-MCNC: 148 MG/DL — HIGH (ref 70–115)
HCT VFR BLD CALC: 34.7 % — LOW (ref 37–47)
HGB BLD-MCNC: 10.9 G/DL — LOW (ref 12–16)
INR BLD: 2.57 RATIO — HIGH (ref 0.88–1.16)
LYMPHOCYTES # BLD AUTO: 1.8 K/UL — SIGNIFICANT CHANGE UP (ref 1–4.8)
LYMPHOCYTES # BLD AUTO: 17.4 % — LOW (ref 20–55)
MAGNESIUM SERPL-MCNC: 1.5 MG/DL — LOW (ref 1.6–2.6)
MCHC RBC-ENTMCNC: 29.7 PG — SIGNIFICANT CHANGE UP (ref 27–31)
MCHC RBC-ENTMCNC: 31.4 G/DL — LOW (ref 32–36)
MCV RBC AUTO: 94.6 FL — SIGNIFICANT CHANGE UP (ref 81–99)
MONOCYTES # BLD AUTO: 1.2 K/UL — HIGH (ref 0–0.8)
MONOCYTES NFR BLD AUTO: 11.2 % — HIGH (ref 3–10)
NEUTROPHILS # BLD AUTO: 7.1 K/UL — SIGNIFICANT CHANGE UP (ref 1.8–8)
NEUTROPHILS NFR BLD AUTO: 67.7 % — SIGNIFICANT CHANGE UP (ref 37–73)
PHOSPHATE SERPL-MCNC: 3.7 MG/DL — SIGNIFICANT CHANGE UP (ref 2.4–4.7)
PLATELET # BLD AUTO: 428 K/UL — HIGH (ref 150–400)
POTASSIUM SERPL-MCNC: 3.3 MMOL/L — LOW (ref 3.5–5.3)
POTASSIUM SERPL-SCNC: 3.3 MMOL/L — LOW (ref 3.5–5.3)
PROTHROM AB SERPL-ACNC: 30.4 SEC — HIGH (ref 10–12.9)
RBC # BLD: 3.67 M/UL — LOW (ref 4.4–5.2)
RBC # FLD: 15.2 % — SIGNIFICANT CHANGE UP (ref 11–15.6)
SODIUM SERPL-SCNC: 134 MMOL/L — LOW (ref 135–145)
WBC # BLD: 10.5 K/UL — SIGNIFICANT CHANGE UP (ref 4.8–10.8)
WBC # FLD AUTO: 10.5 K/UL — SIGNIFICANT CHANGE UP (ref 4.8–10.8)

## 2019-05-13 PROCEDURE — 93351 STRESS TTE COMPLETE: CPT | Mod: 26

## 2019-05-13 PROCEDURE — 93925 LOWER EXTREMITY STUDY: CPT | Mod: 26

## 2019-05-13 PROCEDURE — 99223 1ST HOSP IP/OBS HIGH 75: CPT

## 2019-05-13 PROCEDURE — 93010 ELECTROCARDIOGRAM REPORT: CPT

## 2019-05-13 RX ORDER — DOBUTAMINE HCL 250MG/20ML
10 VIAL (ML) INTRAVENOUS
Qty: 500 | Refills: 0 | Status: DISCONTINUED | OUTPATIENT
Start: 2019-05-13 | End: 2019-05-14

## 2019-05-13 RX ORDER — ACETAMINOPHEN 500 MG
1000 TABLET ORAL ONCE
Refills: 0 | Status: COMPLETED | OUTPATIENT
Start: 2019-05-13 | End: 2019-05-13

## 2019-05-13 RX ORDER — POTASSIUM CHLORIDE 20 MEQ
40 PACKET (EA) ORAL EVERY 4 HOURS
Refills: 0 | Status: COMPLETED | OUTPATIENT
Start: 2019-05-13 | End: 2019-05-13

## 2019-05-13 RX ORDER — ONDANSETRON 8 MG/1
4 TABLET, FILM COATED ORAL EVERY 6 HOURS
Refills: 0 | Status: DISCONTINUED | OUTPATIENT
Start: 2019-05-13 | End: 2019-05-16

## 2019-05-13 RX ADMIN — Medication 100 MILLIGRAM(S): at 14:59

## 2019-05-13 RX ADMIN — Medication 400 MILLIGRAM(S): at 17:54

## 2019-05-13 RX ADMIN — Medication 1000 MILLIGRAM(S): at 23:09

## 2019-05-13 RX ADMIN — HEPARIN SODIUM 2200 UNIT(S)/HR: 5000 INJECTION INTRAVENOUS; SUBCUTANEOUS at 06:52

## 2019-05-13 RX ADMIN — Medication 20 MILLIGRAM(S): at 08:20

## 2019-05-13 RX ADMIN — HYDROMORPHONE HYDROCHLORIDE 1 MILLIGRAM(S): 2 INJECTION INTRAMUSCULAR; INTRAVENOUS; SUBCUTANEOUS at 14:07

## 2019-05-13 RX ADMIN — GABAPENTIN 300 MILLIGRAM(S): 400 CAPSULE ORAL at 14:57

## 2019-05-13 RX ADMIN — SIMVASTATIN 10 MILLIGRAM(S): 20 TABLET, FILM COATED ORAL at 21:02

## 2019-05-13 RX ADMIN — Medication 40 MILLIGRAM(S): at 05:51

## 2019-05-13 RX ADMIN — HYDROMORPHONE HYDROCHLORIDE 1 MILLIGRAM(S): 2 INJECTION INTRAMUSCULAR; INTRAVENOUS; SUBCUTANEOUS at 03:26

## 2019-05-13 RX ADMIN — Medication 81 MILLIGRAM(S): at 11:55

## 2019-05-13 RX ADMIN — HYDROMORPHONE HYDROCHLORIDE 1 MILLIGRAM(S): 2 INJECTION INTRAMUSCULAR; INTRAVENOUS; SUBCUTANEOUS at 06:52

## 2019-05-13 RX ADMIN — Medication 120 MILLIGRAM(S): at 05:52

## 2019-05-13 RX ADMIN — Medication 250 MILLIGRAM(S): at 23:28

## 2019-05-13 RX ADMIN — Medication 100 MILLIGRAM(S): at 05:53

## 2019-05-13 RX ADMIN — GABAPENTIN 300 MILLIGRAM(S): 400 CAPSULE ORAL at 21:02

## 2019-05-13 RX ADMIN — HYDROMORPHONE HYDROCHLORIDE 1 MILLIGRAM(S): 2 INJECTION INTRAMUSCULAR; INTRAVENOUS; SUBCUTANEOUS at 14:22

## 2019-05-13 RX ADMIN — HEPARIN SODIUM 1900 UNIT(S)/HR: 5000 INJECTION INTRAVENOUS; SUBCUTANEOUS at 21:11

## 2019-05-13 RX ADMIN — HYDROMORPHONE HYDROCHLORIDE 1 MILLIGRAM(S): 2 INJECTION INTRAMUSCULAR; INTRAVENOUS; SUBCUTANEOUS at 02:31

## 2019-05-13 RX ADMIN — HYDROMORPHONE HYDROCHLORIDE 1 MILLIGRAM(S): 2 INJECTION INTRAMUSCULAR; INTRAVENOUS; SUBCUTANEOUS at 08:17

## 2019-05-13 RX ADMIN — HYDROMORPHONE HYDROCHLORIDE 0.5 MILLIGRAM(S): 2 INJECTION INTRAMUSCULAR; INTRAVENOUS; SUBCUTANEOUS at 21:02

## 2019-05-13 RX ADMIN — Medication 40 MILLIEQUIVALENT(S): at 09:47

## 2019-05-13 RX ADMIN — HYDROMORPHONE HYDROCHLORIDE 1 MILLIGRAM(S): 2 INJECTION INTRAMUSCULAR; INTRAVENOUS; SUBCUTANEOUS at 18:47

## 2019-05-13 RX ADMIN — HYDROMORPHONE HYDROCHLORIDE 1 MILLIGRAM(S): 2 INJECTION INTRAMUSCULAR; INTRAVENOUS; SUBCUTANEOUS at 20:50

## 2019-05-13 RX ADMIN — HEPARIN SODIUM 1900 UNIT(S)/HR: 5000 INJECTION INTRAVENOUS; SUBCUTANEOUS at 15:06

## 2019-05-13 RX ADMIN — MONTELUKAST 10 MILLIGRAM(S): 4 TABLET, CHEWABLE ORAL at 11:55

## 2019-05-13 RX ADMIN — Medication 250 MILLIGRAM(S): at 09:42

## 2019-05-13 RX ADMIN — Medication 100 MILLIGRAM(S): at 21:01

## 2019-05-13 RX ADMIN — Medication 40 MILLIEQUIVALENT(S): at 14:57

## 2019-05-13 RX ADMIN — HYDROMORPHONE HYDROCHLORIDE 0.5 MILLIGRAM(S): 2 INJECTION INTRAMUSCULAR; INTRAVENOUS; SUBCUTANEOUS at 23:10

## 2019-05-13 RX ADMIN — GABAPENTIN 300 MILLIGRAM(S): 400 CAPSULE ORAL at 05:51

## 2019-05-13 RX ADMIN — HYDROMORPHONE HYDROCHLORIDE 1 MILLIGRAM(S): 2 INJECTION INTRAMUSCULAR; INTRAVENOUS; SUBCUTANEOUS at 23:28

## 2019-05-13 RX ADMIN — HYDROMORPHONE HYDROCHLORIDE 1 MILLIGRAM(S): 2 INJECTION INTRAMUSCULAR; INTRAVENOUS; SUBCUTANEOUS at 08:32

## 2019-05-13 RX ADMIN — HYDROMORPHONE HYDROCHLORIDE 1 MILLIGRAM(S): 2 INJECTION INTRAMUSCULAR; INTRAVENOUS; SUBCUTANEOUS at 06:55

## 2019-05-13 NOTE — CONSULT NOTE ADULT - ASSESSMENT
A/P: 67 y/o F with PMHx of HFpEF (EF 55-60%), Afib (on coumadin), HTN, HLD, chronic venous insufficiency with dermatitis/cellulitis, DMII, Ulcerative colitis, and thyroid nodule who was admitted for LLE erythema and pain. Patient follows with Dr. Justo Mayer and had her last Nuclear stress test was 2016 which was normal. Patient has had multiple hospital admissions for bilateral leg ulcers and cellulitis despite antibiotic and diuretic treatment. Patient states that for 2 days prior to admission she was having increasing leg swelling with weeping and burning. Patient was admitted at this time for IV antibiotics, and scheduled for LE angiogram and potential surgical debridement. Patient states at home she walks with a cane or walker, and has had some short episodes of chest tightness, non-exertional, resolves after a few minutes. Pt states last episode of chest tightness was a few weeks ago, and also has chronic orthopnea.  Patient denies fevers, chills, palpitations, syncope, near syncope, abdominal pain, N/V/D, headache, or dizziness.     1. Radha-Operative Cardiac Risk Stratification  - Patient with some episodes of chest pain  - Last stress test was in 2016  - NPO today  - Add on for Dobutamine Stress Echo this afternoon  - Radha-operative clearance pending results    2. Afib  - Currently rate controlled  - Cont Toprol XL 100mg qday and Diltiazem 120gm PO qday  - Coumadin on hold, continue heparin gtt for AC    3. HTN  - BP well controlled at this time   - Continue current regimen

## 2019-05-13 NOTE — CONSULT NOTE ADULT - SUBJECTIVE AND OBJECTIVE BOX
Consult requested by:  Dr. De Luna    Reason for Consultation: Radha-operative Cardiac Risk Stratification     History obtained by: Patient and medical record     obtained: No    Chief complaint:  "I've been having worsening leg swelling and pain.     HPI: 67 y/o F with PMHx of HFpEF (EF 55-60%), Afib (on coumadin), HTN, HLD, chronic venous insufficiency with dermatitis/cellulitis, DMII, Ulcerative colitis, and thyroid nodule who was admitted for LLE erythema and pain. Patient follows with Dr. Justo Mayer   67 y/o Female with h/o obesity, HTN, dyslipidemia, Diastolic CHF, A.fib (on coumadin), chronic venous insufficiency, DM type 2, thyroid nodule, ulcerative colitis was admitted for worsening LLE erythema / pain. Patient has a history of severe chronic venous insufficiency with multiple previous admissions for bilateral leg ulcers and cellulitis. Pt is well known to vascular surgery. She previously hospitalized for 3 weeks ago at [facility] for similar symptoms. She was discharged one week ago. She presents with 2 days of increasing left leg swelling, weeping and burning pain to right lower extremity. She denies shortness of breath, chest pain, abdominal pain, nausea, fever or chills. (12 May 2019 21:39)        REVIEW OF SYMPTOMS: Cardiovascular:     chest pain,  dyspnea,  syncope,    palpitaitons,      dizziness,    Orthopnea,      Paroxsymal nocturnal dyspnea;  Respiratory:    Dyspnea,   cough,   ;   Genitourinary:  No dysuria, no hematuria; Gastrointestinal:   No dark color stool, no melena, no diarrhea, no constipation, no abdominal pain; Neurological: No headache, no dizziness, no slurred speech;  Psychiatric: No agitation, no anxiety.  ALL OTHER REVIEW OF SYSTEMS ARE NEGATIVE.    MEDICATIONS  (STANDING):  aspirin  chewable 81 milliGRAM(s) Oral daily  clindamycin IVPB 900 milliGRAM(s) IV Intermittent every 8 hours  dextrose 5%. 1000 milliLiter(s) (50 mL/Hr) IV Continuous <Continuous>  dextrose 50% Injectable 12.5 Gram(s) IV Push once  dextrose 50% Injectable 25 Gram(s) IV Push once  dextrose 50% Injectable 25 Gram(s) IV Push once  dicyclomine 20 milliGRAM(s) Oral before breakfast  diltiazem    Tablet 120 milliGRAM(s) Oral daily  furosemide    Tablet 40 milliGRAM(s) Oral two times a day  gabapentin 300 milliGRAM(s) Oral three times a day  heparin  Infusion.  Unit(s)/Hr (22 mL/Hr) IV Continuous <Continuous>  insulin lispro (HumaLOG) corrective regimen sliding scale   SubCutaneous three times a day before meals  metoprolol succinate  milliGRAM(s) Oral daily  montelukast 10 milliGRAM(s) Oral daily  potassium chloride    Tablet ER 40 milliEquivalent(s) Oral every 4 hours  simvastatin 10 milliGRAM(s) Oral at bedtime  vancomycin  IVPB 1000 milliGRAM(s) IV Intermittent every 12 hours  vancomycin  IVPB        MEDICATIONS  (PRN):  dextrose 40% Gel 15 Gram(s) Oral once PRN Blood Glucose LESS THAN 70 milliGRAM(s)/deciliter  glucagon  Injectable 1 milliGRAM(s) IntraMuscular once PRN Glucose LESS THAN 70 milligrams/deciliter  HYDROmorphone  Injectable 1 milliGRAM(s) IV Push every 4 hours PRN Moderate Pain (4 - 6)  HYDROmorphone  Injectable 0.5 milliGRAM(s) IV Push every 4 hours PRN Mild Pain (1 - 3)  ondansetron Injectable 4 milliGRAM(s) IV Push every 6 hours PRN Nausea and/or Vomiting        PAST MEDICAL & SURGICAL HISTORY:  Cellulitis  HTN (hypertension)  Thyroid nodule  Peripheral venous insufficiency  Neuropathy  Morbid obesity with BMI of 40.0-44.9, adult  Dyspnea  Congestive heart failure  Atrial fibrillation  Diabetes mellitus type II, controlled  Status post medial meniscus repair  S/P left knee arthroscopy  Other complications of gastric band procedure  H/O colonoscopy  History of esophagogastroduodenoscopy (EGD)      FAMILY HISTORY:  Family history of diabetes mellitus in mother  Family history of breast cancer in mother      SOCIAL HISTORY:    CIGARETTES:       ALCOHOL:    DRUGS:    Vital Signs Last 24 Hrs  T(C): 37 (13 May 2019 08:58), Max: 38.3 (13 May 2019 07:50)  T(F): 98.6 (13 May 2019 08:58), Max: 101 (13 May 2019 07:50)  HR: 95 (13 May 2019 07:50) (84 - 100)  BP: 129/61 (13 May 2019 07:50) (124/80 - 130/77)  BP(mean): --  RR: 18 (13 May 2019 07:50) (16 - 18)  SpO2: 90% (13 May 2019 07:50) (90% - 99%)    PHYSICAL EXAM:      Constitutional:    Eyes:    ENMT:    Neck:    Breasts:    Back:    Respiratory:    Cardiovascular:    Gastrointestinal:    Genitourinary:    Rectal:    Extremities:    Vascular:    Neurological:    Skin:    Lymph Nodes:    Musculoskeletal:    Psychiatric:        PHYSICAL EXAM:  Appearance: Normal, well nourished	  HEENT:   Normal oral mucosa, PERRL, EOMI, sclera non-icteric	  Lymphatic: No cervical lymphadenopathy  Cardiovascular: Normal S1 S2, No JVD, No cardiac murmurs, No carotid bruits, No peripheral edema  Respiratory: Lungs clear to auscultation	  Psychiatry: A & O x 3, Mood & affect appropriate  Gastrointestinal:  Soft, Non-tender, + BS, no bruits	  Skin: No rashes, No ecchymoses, No cyanosis  Neurologic: Grossly non-focal with full strength in all four extremities  Extremities: Normal range of motion, No clubbing, cyanosis or edema  Vascular: Peripheral pulses palpable 2+ bilaterally  INTERPRETATION OF TELEMETRY:    ECG:    I&O's Detail      LABS:                        10.9   10.5  )-----------( 428      ( 13 May 2019 05:49 )             34.7     05-13    134<L>  |  96<L>  |  8.0  ----------------------------<  148<H>  3.3<L>   |  26.0  |  0.51    Ca    8.2<L>      13 May 2019 05:49  Phos  3.7     05-13  Mg     1.5     05-13    TPro  7.5  /  Alb  2.7<L>  /  TBili  0.4  /  DBili  x   /  AST  21  /  ALT  10  /  AlkPhos  61  05-12        PT/INR - ( 12 May 2019 20:09 )   PT: 31.6 sec;   INR: 2.67 ratio         PTT - ( 13 May 2019 05:49 )  PTT:89.4 sec    I&O's Summary      RADIOLOGY & ADDITIONAL STUDIES:  X-ray:    PREVIOUS DIAGNOSTIC TESTING:      ECHO  FINDINGS: Date:                : LVEF=      ; RV function:       ; Valvular abnoralities: Mitral valve:           ;  Aortic valve:              ;Tricuspid valve:         ; Pulmonary pressures:        Pericardium:      STRESS    FINDINGS: Date:            ;      CATHETERIZATION  FINDINGS:  Date:              :  LAD:                       ;  LCx:                        ; RCA:                ;     Assesment and Plan:  This is a 68yFemale with history of Cellulitis of left lower extremity  Congestive Heart Failure  H/o or current diagnosis of HF- Contraindication to ACEI/ARBs  H/o or current diagnosis of HF- ACEI/ARB contraindication unknown  Family history of diabetes mellitus in mother  Family history of breast cancer in mother  MEWS Score  Cellulitis  HTN (hypertension)  Thyroid nodule  Peripheral venous insufficiency  Neuropathy  Morbid obesity with BMI of 40.0-44.9, adult  Dyspnea  Congestive heart failure  Atrial fibrillation  Diabetes mellitus type II, controlled  Cellulitis  HTN (hypertension)  Cellulitis of left lower extremity  Status post medial meniscus repair  S/P left knee arthroscopy  Other complications of gastric band procedure  H/O colonoscopy  History of esophagogastroduodenoscopy (EGD)  BOTH LEG PAIN  6   presents with Patient is a 68y old  Female who presents with a chief complaint of chronic statis dermatitis and lipodermatosclerosis (13 May 2019 07:57)    1) Consult requested by:  Dr. De Luna    Reason for Consultation: Radha-operative Cardiac Risk Stratification     History obtained by: Patient and medical record     obtained: No    Chief complaint:  "I've been having worsening leg swelling and pain.     HPI: 67 y/o F with PMHx of HFpEF (EF 55-60%), Afib (on coumadin), HTN, HLD, chronic venous insufficiency with dermatitis/cellulitis, DMII, Ulcerative colitis, and thyroid nodule who was admitted for LLE erythema and pain. Patient follows with Dr. Justo Mayer and had her last Nuclear stress test was 2016 which was normal. Patient has had multiple hospital admissions for bilateral leg ulcers and cellulitis despite antibiotic and diuretic treatment. Patient states that for 2 days prior to admission she was having increasing leg swelling with weeping and burning. Patient was admitted at this time for IV antibiotics, and scheduled for LE angiogram and potential surgical debridement. Patient states at home she walks with a cane or walker, and has had some short episodes of chest tightness, non-exertional, resolves after a few minutes. Pt states last episode of chest tightness was a few weeks ago, and also has chronic orthopnea.  Patient denies fevers, chills, palpitations, syncope, near syncope, abdominal pain, N/V/D, headache, or dizziness.       REVIEW OF SYMPTOMS:   Cardiovascular:  AS PER HPI  Respiratory:  AS PER HPI  Genitourinary:  No dysuria, no hematuria;   Gastrointestinal:   No dark color stool, no melena, no diarrhea, no constipation, no abdominal pain;   Neurological: No headache, no dizziness, no slurred speech;    Psychiatric: No agitation, no anxiety.    ALL OTHER REVIEW OF SYSTEMS ARE NEGATIVE.    MEDICATIONS  (STANDING):  aspirin  chewable 81 milliGRAM(s) Oral daily  clindamycin IVPB 900 milliGRAM(s) IV Intermittent every 8 hours  dextrose 5%. 1000 milliLiter(s) (50 mL/Hr) IV Continuous <Continuous>  dextrose 50% Injectable 12.5 Gram(s) IV Push once  dextrose 50% Injectable 25 Gram(s) IV Push once  dextrose 50% Injectable 25 Gram(s) IV Push once  dicyclomine 20 milliGRAM(s) Oral before breakfast  diltiazem    Tablet 120 milliGRAM(s) Oral daily  furosemide    Tablet 40 milliGRAM(s) Oral two times a day  gabapentin 300 milliGRAM(s) Oral three times a day  heparin  Infusion.  Unit(s)/Hr (22 mL/Hr) IV Continuous <Continuous>  insulin lispro (HumaLOG) corrective regimen sliding scale   SubCutaneous three times a day before meals  metoprolol succinate  milliGRAM(s) Oral daily  montelukast 10 milliGRAM(s) Oral daily  potassium chloride    Tablet ER 40 milliEquivalent(s) Oral every 4 hours  simvastatin 10 milliGRAM(s) Oral at bedtime  vancomycin  IVPB 1000 milliGRAM(s) IV Intermittent every 12 hours  vancomycin  IVPB        MEDICATIONS  (PRN):  dextrose 40% Gel 15 Gram(s) Oral once PRN Blood Glucose LESS THAN 70 milliGRAM(s)/deciliter  glucagon  Injectable 1 milliGRAM(s) IntraMuscular once PRN Glucose LESS THAN 70 milligrams/deciliter  HYDROmorphone  Injectable 1 milliGRAM(s) IV Push every 4 hours PRN Moderate Pain (4 - 6)  HYDROmorphone  Injectable 0.5 milliGRAM(s) IV Push every 4 hours PRN Mild Pain (1 - 3)  ondansetron Injectable 4 milliGRAM(s) IV Push every 6 hours PRN Nausea and/or Vomiting    Home Medications:  aspirin 81 mg oral tablet: 1 tab(s) orally once a day (16 Apr 2019 20:54)  Coumadin 5 mg oral tablet: 1 tab(s) orally once a day (at bedtime) (16 Apr 2019 20:54)  dilTIAZem 120 mg oral tablet: 1 tab(s) orally 2 times a day (16 Apr 2019 20:54)  gabapentin 300 mg oral capsule: 1 cap(s) orally once a day (16 Apr 2019 20:54)  HYDROmorphone 4 mg oral tablet: 1 tab(s) orally every 6 hours (06 May 2019 13:23)  Lasix 40 mg oral tablet: 1 tab(s) orally once a day (16 Apr 2019 20:54)  metFORMIN 1000 mg oral tablet: 1 tab(s) orally 2 times a day (16 Apr 2019 20:54)  Metoprolol Succinate  mg oral tablet, extended release: 1 tab(s) orally once a day (16 Apr 2019 20:54)  montelukast 10 mg oral tablet: 1 tab(s) orally once a day (at bedtime) (16 Apr 2019 20:54)  potassium chloride 20 mEq oral tablet, extended release: 1 tab(s) orally 2 times a day (16 Apr 2019 20:54)  pravastatin 10 mg oral tablet: 1 tab(s) orally once a day (16 Apr 2019 20:54)    PAST MEDICAL & SURGICAL HISTORY:  Cellulitis  HTN (hypertension)  Thyroid nodule  Peripheral venous insufficiency  Neuropathy  Morbid obesity with BMI of 40.0-44.9, adult  Dyspnea  Congestive heart failure  Atrial fibrillation  Diabetes mellitus type II, controlled  Status post medial meniscus repair  S/P left knee arthroscopy  Other complications of gastric band procedure  H/O colonoscopy  History of esophagogastroduodenoscopy (EGD)      FAMILY HISTORY:  Family history of diabetes mellitus in mother  Family history of breast cancer in mother      SOCIAL HISTORY: Lives at home with .     CIGARETTES:   Former smoker, Quit 30 years ago, smoked 2 ppd x 20 years    ALCOHOL: Denies    DRUGS: Denies    Vital Signs Last 24 Hrs  T(C): 37 (13 May 2019 08:58), Max: 38.3 (13 May 2019 07:50)  T(F): 98.6 (13 May 2019 08:58), Max: 101 (13 May 2019 07:50)  HR: 95 (13 May 2019 07:50) (84 - 100)  BP: 129/61 (13 May 2019 07:50) (124/80 - 130/77)  RR: 18 (13 May 2019 07:50) (16 - 18)  SpO2: 90% (13 May 2019 07:50) (90% - 99%)      PHYSICAL EXAM:  Appearance: Normal, well nourished	  HEENT:   Normal oral mucosa, PERRL, EOMI, sclera non-icteric	  Lymphatic: No cervical lymphadenopathy  Cardiovascular: Irregularly irregular, No JVD, II/VI systolic murmur LSB, No carotid bruits, + LE edema   Respiratory: Lungs clear to auscultation	  Psychiatry: A & O x 3, Mood & affect appropriate  Gastrointestinal:  Soft, Non-tender, + BS, no bruits	  Skin: No rashes, No ecchymoses, No cyanosis  Neurologic: Grossly non-focal with full strength in all four extremities  Extremities: Normal range of motion, legs wrapped in bandages, patient defers removing  Vascular: 2+ radial pulses, LE wrapped in ACE, patient defers removal     INTERPRETATION OF TELEMETRY: Not on Telemetry    ECG: Afib, LAD, PRWP, NSST/T wave abnormalities     LABS:                        10.9   10.5  )-----------( 428      ( 13 May 2019 05:49 )             34.7     05-13    134<L>  |  96<L>  |  8.0  ----------------------------<  148<H>  3.3<L>   |  26.0  |  0.51    Ca    8.2<L>      13 May 2019 05:49  Phos  3.7     05-13  Mg     1.5     05-13    TPro  7.5  /  Alb  2.7<L>  /  TBili  0.4  /  DBili  x   /  AST  21  /  ALT  10  /  AlkPhos  61  05-12        PT/INR - ( 12 May 2019 20:09 )   PT: 31.6 sec;   INR: 2.67 ratio         PTT - ( 13 May 2019 05:49 )  PTT:89.4 sec    I&O's Summary      PREVIOUS DIAGNOSTIC TESTING:      ECHO  FINDINGS:  < from: Transthoracic Echocardiogram (11.15.18 @ 14:06) >   Impression     Summary     Mild to moderate mitral regurgitation is present.   Significant fibrocalcific changes noted to the aortic valve leaflets with   restriction in leaflet excursion. Peak transaortic gradient is 27 mmHg;   this finding is consistent with mild aortic stenosis.   Trace aortic regurgitation is present.   Moderate (2+) tricuspid valve regurgitation is present.   Normal appearing pulmonic valve structure and function.   The left atrium is moderately dilated.   The left ventricle is normal in size, wall motion and contractility.   Mild concentric left ventricular hypertrophy is present.   Estimated left ventricular ejection fraction is 55-60 %.   The right atrium appears moderately dilated.   Normal appearing right ventricle structure and function.   All visualized extra cardiac structures appears to be normal.   No evidence of pericardial effusion.     Signature      < end of copied text >

## 2019-05-13 NOTE — PROGRESS NOTE ADULT - SUBJECTIVE AND OBJECTIVE BOX
Brief Vascular PA note    Patient with chronic bilateral lower extremity ulcerations with venous stasis  Recently admitted ~ 1 week ago  Worsening left LE sympthoms    Patient will require investigative angiogram to rule out arterial component  Patient will also require surgical debridement on current admission    - Needs medical and cardiology preoperative evaluations   - Local wound care and antibiotics for now  - Hold coumadin and continue heparin ggt     d/w Dr Yoo

## 2019-05-14 LAB
ANION GAP SERPL CALC-SCNC: 15 MMOL/L — SIGNIFICANT CHANGE UP (ref 5–17)
APTT BLD: 56.3 SEC — HIGH (ref 27.5–36.3)
APTT BLD: 71.5 SEC — HIGH (ref 27.5–36.3)
APTT BLD: 93.2 SEC — HIGH (ref 27.5–36.3)
BASOPHILS # BLD AUTO: 0 K/UL — SIGNIFICANT CHANGE UP (ref 0–0.2)
BASOPHILS NFR BLD AUTO: 0.2 % — SIGNIFICANT CHANGE UP (ref 0–2)
BUN SERPL-MCNC: 6 MG/DL — LOW (ref 8–20)
CALCIUM SERPL-MCNC: 8.1 MG/DL — LOW (ref 8.6–10.2)
CHLORIDE SERPL-SCNC: 94 MMOL/L — LOW (ref 98–107)
CO2 SERPL-SCNC: 24 MMOL/L — SIGNIFICANT CHANGE UP (ref 22–29)
CREAT SERPL-MCNC: 0.53 MG/DL — SIGNIFICANT CHANGE UP (ref 0.5–1.3)
EOSINOPHIL # BLD AUTO: 0.4 K/UL — SIGNIFICANT CHANGE UP (ref 0–0.5)
EOSINOPHIL NFR BLD AUTO: 4 % — SIGNIFICANT CHANGE UP (ref 0–6)
GLUCOSE SERPL-MCNC: 145 MG/DL — HIGH (ref 70–115)
HCT VFR BLD CALC: 36.6 % — LOW (ref 37–47)
HGB BLD-MCNC: 11.4 G/DL — LOW (ref 12–16)
LYMPHOCYTES # BLD AUTO: 1.4 K/UL — SIGNIFICANT CHANGE UP (ref 1–4.8)
LYMPHOCYTES # BLD AUTO: 12.9 % — LOW (ref 20–55)
MAGNESIUM SERPL-MCNC: 1.3 MG/DL — LOW (ref 1.6–2.6)
MCHC RBC-ENTMCNC: 29.4 PG — SIGNIFICANT CHANGE UP (ref 27–31)
MCHC RBC-ENTMCNC: 31.1 G/DL — LOW (ref 32–36)
MCV RBC AUTO: 94.3 FL — SIGNIFICANT CHANGE UP (ref 81–99)
MONOCYTES # BLD AUTO: 1.2 K/UL — HIGH (ref 0–0.8)
MONOCYTES NFR BLD AUTO: 11.4 % — HIGH (ref 3–10)
NEUTROPHILS # BLD AUTO: 7.5 K/UL — SIGNIFICANT CHANGE UP (ref 1.8–8)
NEUTROPHILS NFR BLD AUTO: 70.9 % — SIGNIFICANT CHANGE UP (ref 37–73)
PHOSPHATE SERPL-MCNC: 3.5 MG/DL — SIGNIFICANT CHANGE UP (ref 2.4–4.7)
PLATELET # BLD AUTO: 356 K/UL — SIGNIFICANT CHANGE UP (ref 150–400)
POTASSIUM SERPL-MCNC: 3.9 MMOL/L — SIGNIFICANT CHANGE UP (ref 3.5–5.3)
POTASSIUM SERPL-SCNC: 3.9 MMOL/L — SIGNIFICANT CHANGE UP (ref 3.5–5.3)
RBC # BLD: 3.88 M/UL — LOW (ref 4.4–5.2)
RBC # FLD: 15.2 % — SIGNIFICANT CHANGE UP (ref 11–15.6)
SODIUM SERPL-SCNC: 133 MMOL/L — LOW (ref 135–145)
VANCOMYCIN TROUGH SERPL-MCNC: 10.5 UG/ML — SIGNIFICANT CHANGE UP (ref 10–20)
WBC # BLD: 10.6 K/UL — SIGNIFICANT CHANGE UP (ref 4.8–10.8)
WBC # FLD AUTO: 10.6 K/UL — SIGNIFICANT CHANGE UP (ref 4.8–10.8)

## 2019-05-14 PROCEDURE — 99233 SBSQ HOSP IP/OBS HIGH 50: CPT

## 2019-05-14 RX ORDER — ACETAMINOPHEN 500 MG
650 TABLET ORAL EVERY 6 HOURS
Refills: 0 | Status: DISCONTINUED | OUTPATIENT
Start: 2019-05-14 | End: 2019-05-16

## 2019-05-14 RX ORDER — MAGNESIUM OXIDE 400 MG ORAL TABLET 241.3 MG
400 TABLET ORAL DAILY
Refills: 0 | Status: DISCONTINUED | OUTPATIENT
Start: 2019-05-14 | End: 2019-05-16

## 2019-05-14 RX ORDER — ACETAMINOPHEN 500 MG
650 TABLET ORAL EVERY 6 HOURS
Refills: 0 | Status: DISCONTINUED | OUTPATIENT
Start: 2019-05-14 | End: 2019-05-14

## 2019-05-14 RX ORDER — MAGNESIUM SULFATE 500 MG/ML
2 VIAL (ML) INJECTION ONCE
Refills: 0 | Status: COMPLETED | OUTPATIENT
Start: 2019-05-14 | End: 2019-05-14

## 2019-05-14 RX ADMIN — HYDROMORPHONE HYDROCHLORIDE 1 MILLIGRAM(S): 2 INJECTION INTRAMUSCULAR; INTRAVENOUS; SUBCUTANEOUS at 14:22

## 2019-05-14 RX ADMIN — HYDROMORPHONE HYDROCHLORIDE 1 MILLIGRAM(S): 2 INJECTION INTRAMUSCULAR; INTRAVENOUS; SUBCUTANEOUS at 09:06

## 2019-05-14 RX ADMIN — HYDROMORPHONE HYDROCHLORIDE 1 MILLIGRAM(S): 2 INJECTION INTRAMUSCULAR; INTRAVENOUS; SUBCUTANEOUS at 08:46

## 2019-05-14 RX ADMIN — Medication 100 MILLIGRAM(S): at 22:34

## 2019-05-14 RX ADMIN — HEPARIN SODIUM 2200 UNIT(S)/HR: 5000 INJECTION INTRAVENOUS; SUBCUTANEOUS at 13:49

## 2019-05-14 RX ADMIN — Medication 20 MILLIGRAM(S): at 08:51

## 2019-05-14 RX ADMIN — SIMVASTATIN 10 MILLIGRAM(S): 20 TABLET, FILM COATED ORAL at 23:10

## 2019-05-14 RX ADMIN — Medication 100 MILLIGRAM(S): at 05:07

## 2019-05-14 RX ADMIN — GABAPENTIN 300 MILLIGRAM(S): 400 CAPSULE ORAL at 13:48

## 2019-05-14 RX ADMIN — Medication 81 MILLIGRAM(S): at 10:54

## 2019-05-14 RX ADMIN — Medication 650 MILLIGRAM(S): at 23:23

## 2019-05-14 RX ADMIN — Medication 650 MILLIGRAM(S): at 17:22

## 2019-05-14 RX ADMIN — HYDROMORPHONE HYDROCHLORIDE 1 MILLIGRAM(S): 2 INJECTION INTRAMUSCULAR; INTRAVENOUS; SUBCUTANEOUS at 00:37

## 2019-05-14 RX ADMIN — HYDROMORPHONE HYDROCHLORIDE 1 MILLIGRAM(S): 2 INJECTION INTRAMUSCULAR; INTRAVENOUS; SUBCUTANEOUS at 14:00

## 2019-05-14 RX ADMIN — Medication 1: at 10:53

## 2019-05-14 RX ADMIN — Medication 100 MILLIGRAM(S): at 14:07

## 2019-05-14 RX ADMIN — MONTELUKAST 10 MILLIGRAM(S): 4 TABLET, CHEWABLE ORAL at 10:54

## 2019-05-14 RX ADMIN — Medication 650 MILLIGRAM(S): at 17:44

## 2019-05-14 RX ADMIN — HEPARIN SODIUM 2200 UNIT(S)/HR: 5000 INJECTION INTRAVENOUS; SUBCUTANEOUS at 22:35

## 2019-05-14 RX ADMIN — Medication 650 MILLIGRAM(S): at 23:18

## 2019-05-14 RX ADMIN — MAGNESIUM OXIDE 400 MG ORAL TABLET 400 MILLIGRAM(S): 241.3 TABLET ORAL at 17:22

## 2019-05-14 RX ADMIN — GABAPENTIN 300 MILLIGRAM(S): 400 CAPSULE ORAL at 23:10

## 2019-05-14 RX ADMIN — HYDROMORPHONE HYDROCHLORIDE 1 MILLIGRAM(S): 2 INJECTION INTRAMUSCULAR; INTRAVENOUS; SUBCUTANEOUS at 06:27

## 2019-05-14 RX ADMIN — Medication 1: at 17:25

## 2019-05-14 RX ADMIN — Medication 40 MILLIGRAM(S): at 17:29

## 2019-05-14 RX ADMIN — Medication 250 MILLIGRAM(S): at 10:53

## 2019-05-14 RX ADMIN — HEPARIN SODIUM 1900 UNIT(S)/HR: 5000 INJECTION INTRAVENOUS; SUBCUTANEOUS at 05:51

## 2019-05-14 RX ADMIN — GABAPENTIN 300 MILLIGRAM(S): 400 CAPSULE ORAL at 04:59

## 2019-05-14 RX ADMIN — ONDANSETRON 4 MILLIGRAM(S): 8 TABLET, FILM COATED ORAL at 23:11

## 2019-05-14 RX ADMIN — HYDROMORPHONE HYDROCHLORIDE 1 MILLIGRAM(S): 2 INJECTION INTRAMUSCULAR; INTRAVENOUS; SUBCUTANEOUS at 19:59

## 2019-05-14 RX ADMIN — Medication 120 MILLIGRAM(S): at 10:53

## 2019-05-14 RX ADMIN — HYDROMORPHONE HYDROCHLORIDE 1 MILLIGRAM(S): 2 INJECTION INTRAMUSCULAR; INTRAVENOUS; SUBCUTANEOUS at 20:52

## 2019-05-14 RX ADMIN — Medication 50 GRAM(S): at 17:22

## 2019-05-14 RX ADMIN — HYDROMORPHONE HYDROCHLORIDE 1 MILLIGRAM(S): 2 INJECTION INTRAMUSCULAR; INTRAVENOUS; SUBCUTANEOUS at 05:00

## 2019-05-14 NOTE — PROGRESS NOTE ADULT - SUBJECTIVE AND OBJECTIVE BOX
[place] [facility]                                                        Office: [address] [address]                                                       Telephone: [phone]. Fax:[phone]                                                                             PROGRESS NOTE   Reason for follow up: pre-operative cardiovascular risk evaluation and management , diastolic heart failure, peripheral vascular disease                             Overnight: No new events.   Update: got stress echo done. no ischemia. Full resting echo pending.    Subjective: "  i jave back pain. _"   Complains of:  back pain.  Review of symptoms: Cardiac:  No chest pain. No dyspnea. No palpitations.  Respiratory:no cough. No dyspnea  Gastrointestinal: No diarrhea. No abdominal pain. No bleeding.     Past medical history: No updates.   Chronic conditions:  Hypertension: controlled. CHF: Stable.  	  Vitals:  T(C): 38.3 (05-14-19 @ 23:32), Max: 38.6 (05-14-19 @ 17:09)  HR: 112 (05-14-19 @ 23:32) (85 - 119)  BP: 128/76 (05-14-19 @ 23:32) (91/62 - 160/81)  RR: 18 (05-14-19 @ 23:32) (18 - 18)  SpO2: 93% (05-14-19 @ 23:32) (92% - 93%)  Wt(kg): --  I&O's Summary    14 May 2019 07:01  -  14 May 2019 23:37  --------------------------------------------------------  IN: 300 mL / OUT: 900 mL / NET: -600 mL      Weight (kg): 122.5 (05-12 @ 16:40)    PHYSICAL EXAM:  Appearance: Comfortable. No acute distress  HEENT:  Head and neck: Atraumatic. Normocephalic.  Normal oral mucosa, PERRL, Neck is supple. No carotid bruit.   Neurologic: A & O x 3, no focal deficits. EOMI , Cranial nerves are intact.  Lymphatic: No cervical lymphadenopathy  Cardiovascular: Normal S1 S2, 3/6 ssytolic ejection murmur, rubs/gallops.   Respiratory: bilateral basal crepts.   Gastrointestinal:  Soft, Non-tender, + BS  Lower Extremities:  wrapped in ace wrap. wound not examined.   Psychiatry: Patient is calm. No agitation. Mood & affect appropriate  Skin: No rashes/ ecchymoses/cyanosis/ulcers visualized on the face,     CURRENT MEDICATIONS:  diltiazem    Tablet 120 milliGRAM(s) Oral daily  furosemide    Tablet 40 milliGRAM(s) Oral two times a day  metoprolol succinate  milliGRAM(s) Oral daily    montelukast  clindamycin IVPB  vancomycin  IVPB  vancomycin  IVPB  gabapentin  dicyclomine  dextrose 50% Injectable  dextrose 50% Injectable  dextrose 50% Injectable  insulin lispro (HumaLOG) corrective regimen sliding scale  simvastatin  aspirin  chewable  dextrose 5%.  heparin  Infusion.  magnesium oxide      LABS:	 	                              11.4   10.6  )-----------( 356      ( 14 May 2019 05:50 )             36.6     05-14    133<L>  |  94<L>  |  6.0<L>  ----------------------------<  145<H>  3.9   |  24.0  |  0.53    Ca    8.1<L>      14 May 2019 05:50  Phos  3.5     05-14  Mg     1.3     05-14      proBNP:   Lipid Profile:   HgA1c: TSH:     TELEMETRY: Reviewed  no events.   ECG:  Reviewed by me. < from: 12 Lead ECG (05.13.19 @ 09:20) >  Atrial fibrillation with a   	    DIAGNOSTIC TESTING:  [ ] Echocardiogram: pending   [ ] Stress Test:  Stres echo. dobutamine No ischemia. Mild JOSE LUIS dysfunction on resting echo with inotropic contractile reserve.

## 2019-05-14 NOTE — PROGRESS NOTE ADULT - SUBJECTIVE AND OBJECTIVE BOX
[facility]    [id number]    History:      Patient seen this am.  Bilateral lower extremity chronic dermatitis. Left > Right.  Complaining of lower ext pain.  on Hep ggt.  Denies nausea, vomiting, chest pain, shortness of breath, abdominal pain or fever.   No acute motor or sensory changes are reported.    Vital Signs Last 24 Hrs  T(C): 38.3 (14 May 2019 07:38), Max: 38.3 (14 May 2019 07:38)  T(F): 101 (14 May 2019 07:38), Max: 101 (14 May 2019 07:38)  HR: 119 (14 May 2019 07:38) (91 - 119)  BP: 121/76 (14 May 2019 07:38) (102/71 - 121/76)  BP(mean): --  RR: 18 (14 May 2019 07:38) (18 - 18)  SpO2: 93% (14 May 2019 07:38) (93% - 93%)  I&O's Summary                            11.4   10.6  )-----------( 356      ( 14 May 2019 05:50 )             36.6     05-14    133<L>  |  94<L>  |  6.0<L>  ----------------------------<  145<H>  3.9   |  24.0  |  0.53    Ca    8.1<L>      14 May 2019 05:50  Phos  3.5     05-14  Mg     1.3     05-14    TPro  7.5  /  Alb  2.7<L>  /  TBili  0.4  /  DBili  x   /  AST  21  /  ALT  10  /  AlkPhos  61  05-12      MEDICATIONS  (STANDING):  aspirin  chewable 81 milliGRAM(s) Oral daily  clindamycin IVPB 900 milliGRAM(s) IV Intermittent every 8 hours  dextrose 5%. 1000 milliLiter(s) (50 mL/Hr) IV Continuous <Continuous>  dextrose 50% Injectable 12.5 Gram(s) IV Push once  dextrose 50% Injectable 25 Gram(s) IV Push once  dextrose 50% Injectable 25 Gram(s) IV Push once  dicyclomine 20 milliGRAM(s) Oral before breakfast  diltiazem    Tablet 120 milliGRAM(s) Oral daily  DOBUTamine Infusion 10 MICROgram(s)/kG/Min (36.75 mL/Hr) IV Continuous <Continuous>  furosemide    Tablet 40 milliGRAM(s) Oral two times a day  gabapentin 300 milliGRAM(s) Oral three times a day  heparin  Infusion.  Unit(s)/Hr (22 mL/Hr) IV Continuous <Continuous>  insulin lispro (HumaLOG) corrective regimen sliding scale   SubCutaneous three times a day before meals  metoprolol succinate  milliGRAM(s) Oral daily  montelukast 10 milliGRAM(s) Oral daily  simvastatin 10 milliGRAM(s) Oral at bedtime  vancomycin  IVPB 1000 milliGRAM(s) IV Intermittent every 12 hours  vancomycin  IVPB        MEDICATIONS  (PRN):  dextrose 40% Gel 15 Gram(s) Oral once PRN Blood Glucose LESS THAN 70 milliGRAM(s)/deciliter  glucagon  Injectable 1 milliGRAM(s) IntraMuscular once PRN Glucose LESS THAN 70 milligrams/deciliter  HYDROmorphone  Injectable 1 milliGRAM(s) IV Push every 4 hours PRN Moderate Pain (4 - 6)  HYDROmorphone  Injectable 0.5 milliGRAM(s) IV Push every 4 hours PRN Mild Pain (1 - 3)  ondansetron Injectable 4 milliGRAM(s) IV Push every 6 hours PRN Nausea and/or Vomiting      Physical exam:     No acute distress  Non labored breathing  Abdomen is obese, soft.  pedals non palpable   Dressing changed to the bilateral lower extremities.   left Le with chronic superficial ulcerations, mild surrounding erythema.   bilateral hyperpigmentation.   No foot drop.  No gross motor deficits.   Capillary refill is less than 2 seconds. No cyanosis.    Assessment:  • Chronic bilateral hyperpigmentation, left lower extremity stasis ulcerations with a likely underlying arterial component     •   Plan:   • continue heparin ggt  - pain control as needed  - compression and elevation  - plan for diagnostic angiogram this friday

## 2019-05-15 ENCOUNTER — TRANSCRIPTION ENCOUNTER (OUTPATIENT)
Age: 69
End: 2019-05-15

## 2019-05-15 LAB
ANION GAP SERPL CALC-SCNC: 13 MMOL/L — SIGNIFICANT CHANGE UP (ref 5–17)
APTT BLD: 106.8 SEC — HIGH (ref 27.5–36.3)
APTT BLD: 56 SEC — HIGH (ref 27.5–36.3)
APTT BLD: 60.6 SEC — HIGH (ref 27.5–36.3)
BLD GP AB SCN SERPL QL: SIGNIFICANT CHANGE UP
BUN SERPL-MCNC: 5 MG/DL — LOW (ref 8–20)
CALCIUM SERPL-MCNC: 8.4 MG/DL — LOW (ref 8.6–10.2)
CHLORIDE SERPL-SCNC: 96 MMOL/L — LOW (ref 98–107)
CO2 SERPL-SCNC: 24 MMOL/L — SIGNIFICANT CHANGE UP (ref 22–29)
CREAT SERPL-MCNC: 0.45 MG/DL — LOW (ref 0.5–1.3)
GLUCOSE SERPL-MCNC: 122 MG/DL — HIGH (ref 70–115)
HCT VFR BLD CALC: 36.4 % — LOW (ref 37–47)
HGB BLD-MCNC: 11.4 G/DL — LOW (ref 12–16)
MAGNESIUM SERPL-MCNC: 1.6 MG/DL — SIGNIFICANT CHANGE UP (ref 1.6–2.6)
MCHC RBC-ENTMCNC: 29.6 PG — SIGNIFICANT CHANGE UP (ref 27–31)
MCHC RBC-ENTMCNC: 31.3 G/DL — LOW (ref 32–36)
MCV RBC AUTO: 94.5 FL — SIGNIFICANT CHANGE UP (ref 81–99)
PLATELET # BLD AUTO: 318 K/UL — SIGNIFICANT CHANGE UP (ref 150–400)
POTASSIUM SERPL-MCNC: 4 MMOL/L — SIGNIFICANT CHANGE UP (ref 3.5–5.3)
POTASSIUM SERPL-SCNC: 4 MMOL/L — SIGNIFICANT CHANGE UP (ref 3.5–5.3)
RBC # BLD: 3.85 M/UL — LOW (ref 4.4–5.2)
RBC # FLD: 15 % — SIGNIFICANT CHANGE UP (ref 11–15.6)
SODIUM SERPL-SCNC: 133 MMOL/L — LOW (ref 135–145)
TYPE + AB SCN PNL BLD: SIGNIFICANT CHANGE UP
WBC # BLD: 11 K/UL — HIGH (ref 4.8–10.8)
WBC # FLD AUTO: 11 K/UL — HIGH (ref 4.8–10.8)

## 2019-05-15 PROCEDURE — 99233 SBSQ HOSP IP/OBS HIGH 50: CPT

## 2019-05-15 RX ORDER — SODIUM CHLORIDE 9 MG/ML
250 INJECTION INTRAMUSCULAR; INTRAVENOUS; SUBCUTANEOUS
Refills: 0 | Status: DISCONTINUED | OUTPATIENT
Start: 2019-05-15 | End: 2019-05-16

## 2019-05-15 RX ORDER — MAGNESIUM SULFATE 500 MG/ML
2 VIAL (ML) INJECTION ONCE
Refills: 0 | Status: COMPLETED | OUTPATIENT
Start: 2019-05-15 | End: 2019-05-15

## 2019-05-15 RX ORDER — HYDROMORPHONE HYDROCHLORIDE 2 MG/ML
1 INJECTION INTRAMUSCULAR; INTRAVENOUS; SUBCUTANEOUS
Refills: 0 | Status: DISCONTINUED | OUTPATIENT
Start: 2019-05-15 | End: 2019-05-16

## 2019-05-15 RX ORDER — HYDROMORPHONE HYDROCHLORIDE 2 MG/ML
4 INJECTION INTRAMUSCULAR; INTRAVENOUS; SUBCUTANEOUS EVERY 4 HOURS
Refills: 0 | Status: DISCONTINUED | OUTPATIENT
Start: 2019-05-15 | End: 2019-05-16

## 2019-05-15 RX ORDER — HYDROMORPHONE HYDROCHLORIDE 2 MG/ML
2 INJECTION INTRAMUSCULAR; INTRAVENOUS; SUBCUTANEOUS EVERY 4 HOURS
Refills: 0 | Status: DISCONTINUED | OUTPATIENT
Start: 2019-05-15 | End: 2019-05-16

## 2019-05-15 RX ORDER — HYDROMORPHONE HYDROCHLORIDE 2 MG/ML
1 INJECTION INTRAMUSCULAR; INTRAVENOUS; SUBCUTANEOUS ONCE
Refills: 0 | Status: DISCONTINUED | OUTPATIENT
Start: 2019-05-15 | End: 2019-05-15

## 2019-05-15 RX ADMIN — ONDANSETRON 4 MILLIGRAM(S): 8 TABLET, FILM COATED ORAL at 12:22

## 2019-05-15 RX ADMIN — HEPARIN SODIUM 1900 UNIT(S)/HR: 5000 INJECTION INTRAVENOUS; SUBCUTANEOUS at 05:41

## 2019-05-15 RX ADMIN — Medication 250 MILLIGRAM(S): at 00:11

## 2019-05-15 RX ADMIN — Medication 120 MILLIGRAM(S): at 05:36

## 2019-05-15 RX ADMIN — HYDROMORPHONE HYDROCHLORIDE 1 MILLIGRAM(S): 2 INJECTION INTRAMUSCULAR; INTRAVENOUS; SUBCUTANEOUS at 06:30

## 2019-05-15 RX ADMIN — MAGNESIUM OXIDE 400 MG ORAL TABLET 400 MILLIGRAM(S): 241.3 TABLET ORAL at 12:23

## 2019-05-15 RX ADMIN — HYDROMORPHONE HYDROCHLORIDE 1 MILLIGRAM(S): 2 INJECTION INTRAMUSCULAR; INTRAVENOUS; SUBCUTANEOUS at 05:34

## 2019-05-15 RX ADMIN — MONTELUKAST 10 MILLIGRAM(S): 4 TABLET, CHEWABLE ORAL at 12:23

## 2019-05-15 RX ADMIN — Medication 81 MILLIGRAM(S): at 12:23

## 2019-05-15 RX ADMIN — Medication 40 MILLIGRAM(S): at 05:34

## 2019-05-15 RX ADMIN — HYDROMORPHONE HYDROCHLORIDE 2 MILLIGRAM(S): 2 INJECTION INTRAMUSCULAR; INTRAVENOUS; SUBCUTANEOUS at 12:23

## 2019-05-15 RX ADMIN — Medication 100 MILLIGRAM(S): at 14:46

## 2019-05-15 RX ADMIN — Medication 650 MILLIGRAM(S): at 19:50

## 2019-05-15 RX ADMIN — HYDROMORPHONE HYDROCHLORIDE 1 MILLIGRAM(S): 2 INJECTION INTRAMUSCULAR; INTRAVENOUS; SUBCUTANEOUS at 00:11

## 2019-05-15 RX ADMIN — GABAPENTIN 300 MILLIGRAM(S): 400 CAPSULE ORAL at 05:35

## 2019-05-15 RX ADMIN — Medication 650 MILLIGRAM(S): at 12:23

## 2019-05-15 RX ADMIN — Medication 250 MILLIGRAM(S): at 22:30

## 2019-05-15 RX ADMIN — HYDROMORPHONE HYDROCHLORIDE 0.5 MILLIGRAM(S): 2 INJECTION INTRAMUSCULAR; INTRAVENOUS; SUBCUTANEOUS at 08:00

## 2019-05-15 RX ADMIN — SIMVASTATIN 10 MILLIGRAM(S): 20 TABLET, FILM COATED ORAL at 21:41

## 2019-05-15 RX ADMIN — Medication 100 MILLIGRAM(S): at 05:34

## 2019-05-15 RX ADMIN — Medication 650 MILLIGRAM(S): at 18:49

## 2019-05-15 RX ADMIN — HYDROMORPHONE HYDROCHLORIDE 1 MILLIGRAM(S): 2 INJECTION INTRAMUSCULAR; INTRAVENOUS; SUBCUTANEOUS at 14:28

## 2019-05-15 RX ADMIN — GABAPENTIN 300 MILLIGRAM(S): 400 CAPSULE ORAL at 21:41

## 2019-05-15 RX ADMIN — Medication 650 MILLIGRAM(S): at 14:30

## 2019-05-15 RX ADMIN — Medication 100 MILLIGRAM(S): at 05:36

## 2019-05-15 RX ADMIN — GABAPENTIN 300 MILLIGRAM(S): 400 CAPSULE ORAL at 12:23

## 2019-05-15 RX ADMIN — HYDROMORPHONE HYDROCHLORIDE 1 MILLIGRAM(S): 2 INJECTION INTRAMUSCULAR; INTRAVENOUS; SUBCUTANEOUS at 12:32

## 2019-05-15 RX ADMIN — HEPARIN SODIUM 2200 UNIT(S)/HR: 5000 INJECTION INTRAVENOUS; SUBCUTANEOUS at 20:17

## 2019-05-15 RX ADMIN — HYDROMORPHONE HYDROCHLORIDE 1 MILLIGRAM(S): 2 INJECTION INTRAMUSCULAR; INTRAVENOUS; SUBCUTANEOUS at 00:27

## 2019-05-15 RX ADMIN — HYDROMORPHONE HYDROCHLORIDE 4 MILLIGRAM(S): 2 INJECTION INTRAMUSCULAR; INTRAVENOUS; SUBCUTANEOUS at 21:06

## 2019-05-15 RX ADMIN — HEPARIN SODIUM 2200 UNIT(S)/HR: 5000 INJECTION INTRAVENOUS; SUBCUTANEOUS at 13:15

## 2019-05-15 RX ADMIN — HYDROMORPHONE HYDROCHLORIDE 4 MILLIGRAM(S): 2 INJECTION INTRAMUSCULAR; INTRAVENOUS; SUBCUTANEOUS at 22:06

## 2019-05-15 RX ADMIN — Medication 250 MILLIGRAM(S): at 12:25

## 2019-05-15 RX ADMIN — Medication 20 MILLIGRAM(S): at 05:35

## 2019-05-15 RX ADMIN — Medication 100 MILLIGRAM(S): at 21:41

## 2019-05-15 RX ADMIN — HYDROMORPHONE HYDROCHLORIDE 0.5 MILLIGRAM(S): 2 INJECTION INTRAMUSCULAR; INTRAVENOUS; SUBCUTANEOUS at 10:18

## 2019-05-15 RX ADMIN — HYDROMORPHONE HYDROCHLORIDE 2 MILLIGRAM(S): 2 INJECTION INTRAMUSCULAR; INTRAVENOUS; SUBCUTANEOUS at 14:31

## 2019-05-15 RX ADMIN — Medication 1: at 12:40

## 2019-05-15 RX ADMIN — SODIUM CHLORIDE 125 MILLILITER(S): 9 INJECTION INTRAMUSCULAR; INTRAVENOUS; SUBCUTANEOUS at 14:58

## 2019-05-15 NOTE — PROGRESS NOTE ADULT - SUBJECTIVE AND OBJECTIVE BOX
History:      Patient seen this am.  Bilateral lower extremity chronic dermatitis. Left > Right.  Complaining of lower ext pain.  Remains on Hep ggt.  Denies nausea, vomiting, chest pain, shortness of breath, abdominal pain or fever.  No acute motor or sensory changes are reported.    Vital Signs Last 24 Hrs  T(C): 37.5 (15 May 2019 07:20), Max: 38.6 (14 May 2019 17:09)  T(F): 99.5 (15 May 2019 07:20), Max: 101.5 (14 May 2019 17:09)  HR: 96 (15 May 2019 07:20) (69 - 112)  BP: 104/71 (15 May 2019 07:20) (91/62 - 160/81)  BP(mean): 93 (14 May 2019 23:32) (93 - 93)  RR: 18 (15 May 2019 07:20) (18 - 18)  SpO2: 94% (15 May 2019 07:20) (92% - 95%)                          11.4   11.0  )-----------( 318      ( 15 May 2019 08:22 )             36.4   05-15    133<L>  |  96<L>  |  5.0<L>  ----------------------------<  122<H>  4.0   |  24.0  |  0.45<L>    Ca    8.4<L>      15 May 2019 08:23  Phos  3.5     05-14  Mg     1.6     05-15    PT/INR - ( 13 May 2019 20:29 )   PT: 30.4 sec;   INR: 2.57 ratio    PTT - ( 15 May 2019 04:39 )  PTT:106.8 sec      MEDICATIONS  (STANDING):  aspirin  chewable 81 milliGRAM(s) Oral daily  clindamycin IVPB 900 milliGRAM(s) IV Intermittent every 8 hours  dextrose 5%. 1000 milliLiter(s) (50 mL/Hr) IV Continuous <Continuous>  dextrose 50% Injectable 12.5 Gram(s) IV Push once  dextrose 50% Injectable 25 Gram(s) IV Push once  dextrose 50% Injectable 25 Gram(s) IV Push once  dicyclomine 20 milliGRAM(s) Oral before breakfast  diltiazem    Tablet 120 milliGRAM(s) Oral daily  furosemide    Tablet 40 milliGRAM(s) Oral two times a day  gabapentin 300 milliGRAM(s) Oral three times a day  heparin  Infusion.  Unit(s)/Hr (22 mL/Hr) IV Continuous <Continuous>  insulin lispro (HumaLOG) corrective regimen sliding scale   SubCutaneous three times a day before meals  magnesium oxide 400 milliGRAM(s) Oral daily  metoprolol succinate  milliGRAM(s) Oral daily  montelukast 10 milliGRAM(s) Oral daily  simvastatin 10 milliGRAM(s) Oral at bedtime  vancomycin  IVPB 1000 milliGRAM(s) IV Intermittent every 12 hours  vancomycin  IVPB        MEDICATIONS  (PRN):  acetaminophen   Tablet .. 650 milliGRAM(s) Oral every 6 hours PRN Temp greater or equal to 38C (100.4F), Mild Pain (1 - 3)  dextrose 40% Gel 15 Gram(s) Oral once PRN Blood Glucose LESS THAN 70 milliGRAM(s)/deciliter  glucagon  Injectable 1 milliGRAM(s) IntraMuscular once PRN Glucose LESS THAN 70 milligrams/deciliter  HYDROmorphone  Injectable 1 milliGRAM(s) IV Push every 4 hours PRN Moderate Pain (4 - 6)  HYDROmorphone  Injectable 0.5 milliGRAM(s) IV Push every 4 hours PRN Mild Pain (1 - 3)  ondansetron Injectable 4 milliGRAM(s) IV Push every 6 hours PRN Nausea and/or Vomiting      Physical exam:     No acute distress  Non labored breathing  Abdomen is obese, soft.  pedals non palpable   Dressing to the bilateral lower extremities CDI  No foot drop.  No gross motor deficits.   Capillary refill is less than 2 seconds. No cyanosis.    Assessment:  • Chronic bilateral hyperpigmentation, left lower extremity stasis ulcerations with a likely underlying arterial component     •   Plan:   • continue heparin ggt  - pain control as needed  - compression and elevation  - plan for debridement in OR tomorrow and diagnostic angiogram this friday

## 2019-05-15 NOTE — PROGRESS NOTE ADULT - SUBJECTIVE AND OBJECTIVE BOX
[facility]                                                        Office: [address] [address]                                                       Telephone: [phone]. Fax:[phone]                                                                             PROGRESS NOTE   Reason for follow up: pre-operative cardiovascular risk evaluation and management , diastolic heart failure, peripheral vascular disease                             Overnight: fever  Update:  had a fever overnight. feels fatigue and tired.     Subjective: "  i am not feeling right._"   Complains of:  fatigue and tired.  Review of symptoms: Cardiac:  No chest pain. No dyspnea. No palpitations.  Respiratory: no cough. No dyspnea  Gastrointestinal: No diarrhea. No abdominal pain. No bleeding.     Past medical history: No updates.   Chronic conditions:  Hypertension: controlled. CHF: Stable.  	  Vitals:  Vital Signs Last 24 Hrs  T(C): 37.5 (05-15-19 @ 07:20), Max: 38.6 (05-14-19 @ 17:09)  T(F): 99.5 (05-15-19 @ 07:20), Max: 101.5 (05-14-19 @ 17:09)  HR: 96 (05-15-19 @ 07:20) (69 - 112)  BP: 104/71 (05-15-19 @ 07:20) (91/62 - 160/81)  BP(mean): 93 (05-14-19 @ 23:32) (93 - 93)  RR: 18 (05-15-19 @ 07:20) (18 - 18)  SpO2: 94% (05-15-19 @ 07:20) (92% - 95%)    Weight (kg): 122.5 (05-12 @ 16:40)    PHYSICAL EXAM:  Appearance: Comfortable. No acute distress  HEENT:  Head and neck: Atraumatic. Normocephalic.  Normal oral mucosa, PERRL, Neck is supple. No carotid bruit.   Neurologic: A & O x 3, no focal deficits. EOMI , Cranial nerves are intact.  Lymphatic: No cervical lymphadenopathy  Cardiovascular: Normal S1 S2, 3/6 systolic ejection murmur, rubs/gallops.   Respiratory: bilateral basal crepts.   Gastrointestinal:  Soft, Non-tender, + BS  Lower Extremities:  wrapped in ace wrap. wound not examined.   Psychiatry: Patient is calm. No agitation. Mood & affect appropriate  Skin: No rashes/ ecchymoses/cyanosis/ulcers visualized on the face,     CURRENT MEDICATIONS:  MEDICATIONS  (STANDING):  diltiazem    Tablet 120 milliGRAM(s) Oral daily  furosemide    Tablet 40 milliGRAM(s) Oral two times a day  metoprolol succinate  milliGRAM(s) Oral daily    montelukast  dicyclomine  clindamycin IVPB  vancomycin  IVPB  vancomycin  IVPB  aspirin  chewable  dextrose 5%.  dextrose 50% Injectable  dextrose 50% Injectable  dextrose 50% Injectable  gabapentin  heparin  Infusion.  insulin lispro (HumaLOG) corrective regimen sliding scale  magnesium oxide  magnesium sulfate  IVPB  simvastatin    PRN: acetaminophen   Tablet .. PRN  dextrose 40% Gel PRN  glucagon  Injectable PRN  HYDROmorphone   Tablet PRN  HYDROmorphone   Tablet PRN  HYDROmorphone  Injectable PRN  HYDROmorphone  Injectable PRN  ondansetron Injectable PRN      LABS:	 	                                       11.4   11.0  )-----------( 318      ( 15 May 2019 08:22 )             36.4   N=x    ; L=x        15 May 2019 08:23    133    |  96     |  5.0    ----------------------------<  122    4.0     |  24.0   |  0.45     Ca    8.4        15 May 2019 08:23  Phos  3.5       14 May 2019 05:50  Mg     1.6       15 May 2019 08:23    Lipid Profile:   HgA1c: TSH:     TELEMETRY: Reviewed  no events.   ECG:  Reviewed by me. < from: 12 Lead ECG (05.13.19 @ 09:20) >  Atrial fibrillation with a   	    DIAGNOSTIC TESTING:  [ ] Echocardiogram: LVEF 48%. Severe LAE. moderate aortic stneosis.   [ ] Stress Test:  Stres echo. dobutamine No ischemia. Mild JOSE LUIS dysfunction on resting echo with inotropic contractile reserve.

## 2019-05-15 NOTE — CHART NOTE - NSCHARTNOTEFT_GEN_A_CORE
LLE wound dressing changed at bedside. Patient pre-medicated with dilaudid 1mg. Tolerated well but may need stronger dose for dressing change moving forward. LLE dressed for xeroform, kerlix, and ACE wrap up to the knee.

## 2019-05-16 ENCOUNTER — TRANSCRIPTION ENCOUNTER (OUTPATIENT)
Age: 69
End: 2019-05-16

## 2019-05-16 LAB
ANION GAP SERPL CALC-SCNC: 11 MMOL/L — SIGNIFICANT CHANGE UP (ref 5–17)
APTT BLD: 46.4 SEC — HIGH (ref 27.5–36.3)
APTT BLD: 49.4 SEC — HIGH (ref 27.5–36.3)
BASOPHILS # BLD AUTO: 0 K/UL — SIGNIFICANT CHANGE UP (ref 0–0.2)
BASOPHILS NFR BLD AUTO: 0.3 % — SIGNIFICANT CHANGE UP (ref 0–2)
BUN SERPL-MCNC: 4 MG/DL — LOW (ref 8–20)
CALCIUM SERPL-MCNC: 8.5 MG/DL — LOW (ref 8.6–10.2)
CHLORIDE SERPL-SCNC: 96 MMOL/L — LOW (ref 98–107)
CO2 SERPL-SCNC: 29 MMOL/L — SIGNIFICANT CHANGE UP (ref 22–29)
CREAT SERPL-MCNC: 0.45 MG/DL — LOW (ref 0.5–1.3)
EOSINOPHIL # BLD AUTO: 0.4 K/UL — SIGNIFICANT CHANGE UP (ref 0–0.5)
EOSINOPHIL NFR BLD AUTO: 4.5 % — SIGNIFICANT CHANGE UP (ref 0–6)
GLUCOSE SERPL-MCNC: 127 MG/DL — HIGH (ref 70–115)
HCT VFR BLD CALC: 32.5 % — LOW (ref 37–47)
HCT VFR BLD CALC: 33.1 % — LOW (ref 37–47)
HCT VFR BLD CALC: 33.1 % — LOW (ref 37–47)
HGB BLD-MCNC: 10.3 G/DL — LOW (ref 12–16)
HGB BLD-MCNC: 10.5 G/DL — LOW (ref 12–16)
HGB BLD-MCNC: 10.5 G/DL — LOW (ref 12–16)
INR BLD: 1.69 RATIO — HIGH (ref 0.88–1.16)
LYMPHOCYTES # BLD AUTO: 1.3 K/UL — SIGNIFICANT CHANGE UP (ref 1–4.8)
LYMPHOCYTES # BLD AUTO: 13.6 % — LOW (ref 20–55)
MAGNESIUM SERPL-MCNC: 1.6 MG/DL — SIGNIFICANT CHANGE UP (ref 1.6–2.6)
MCHC RBC-ENTMCNC: 29.7 PG — SIGNIFICANT CHANGE UP (ref 27–31)
MCHC RBC-ENTMCNC: 31.7 G/DL — LOW (ref 32–36)
MCV RBC AUTO: 93.7 FL — SIGNIFICANT CHANGE UP (ref 81–99)
MCV RBC AUTO: 93.8 FL — SIGNIFICANT CHANGE UP (ref 81–99)
MCV RBC AUTO: 93.8 FL — SIGNIFICANT CHANGE UP (ref 81–99)
MONOCYTES # BLD AUTO: 1 K/UL — HIGH (ref 0–0.8)
MONOCYTES NFR BLD AUTO: 10.7 % — HIGH (ref 3–10)
NEUTROPHILS # BLD AUTO: 6.7 K/UL — SIGNIFICANT CHANGE UP (ref 1.8–8)
NEUTROPHILS NFR BLD AUTO: 70.6 % — SIGNIFICANT CHANGE UP (ref 37–73)
PHOSPHATE SERPL-MCNC: 3.4 MG/DL — SIGNIFICANT CHANGE UP (ref 2.4–4.7)
PLATELET # BLD AUTO: 364 K/UL — SIGNIFICANT CHANGE UP (ref 150–400)
PLATELET # BLD AUTO: 364 K/UL — SIGNIFICANT CHANGE UP (ref 150–400)
PLATELET # BLD AUTO: 398 K/UL — SIGNIFICANT CHANGE UP (ref 150–400)
POTASSIUM SERPL-MCNC: 3.1 MMOL/L — LOW (ref 3.5–5.3)
POTASSIUM SERPL-SCNC: 3.1 MMOL/L — LOW (ref 3.5–5.3)
PROTHROM AB SERPL-ACNC: 19.7 SEC — HIGH (ref 10–12.9)
RBC # BLD: 3.47 M/UL — LOW (ref 4.4–5.2)
RBC # BLD: 3.53 M/UL — LOW (ref 4.4–5.2)
RBC # BLD: 3.53 M/UL — LOW (ref 4.4–5.2)
RBC # FLD: 14.9 % — SIGNIFICANT CHANGE UP (ref 11–15.6)
SODIUM SERPL-SCNC: 136 MMOL/L — SIGNIFICANT CHANGE UP (ref 135–145)
WBC # BLD: 8.1 K/UL — SIGNIFICANT CHANGE UP (ref 4.8–10.8)
WBC # BLD: 9.5 K/UL — SIGNIFICANT CHANGE UP (ref 4.8–10.8)
WBC # BLD: 9.5 K/UL — SIGNIFICANT CHANGE UP (ref 4.8–10.8)
WBC # FLD AUTO: 8.1 K/UL — SIGNIFICANT CHANGE UP (ref 4.8–10.8)
WBC # FLD AUTO: 9.5 K/UL — SIGNIFICANT CHANGE UP (ref 4.8–10.8)
WBC # FLD AUTO: 9.5 K/UL — SIGNIFICANT CHANGE UP (ref 4.8–10.8)

## 2019-05-16 PROCEDURE — 99223 1ST HOSP IP/OBS HIGH 75: CPT | Mod: GC

## 2019-05-16 PROCEDURE — 11042 DBRDMT SUBQ TIS 1ST 20SQCM/<: CPT

## 2019-05-16 RX ORDER — HYDROMORPHONE HYDROCHLORIDE 2 MG/ML
2 INJECTION INTRAMUSCULAR; INTRAVENOUS; SUBCUTANEOUS EVERY 4 HOURS
Refills: 0 | Status: DISCONTINUED | OUTPATIENT
Start: 2019-05-16 | End: 2019-05-23

## 2019-05-16 RX ORDER — MAGNESIUM OXIDE 400 MG ORAL TABLET 241.3 MG
400 TABLET ORAL DAILY
Refills: 0 | Status: COMPLETED | OUTPATIENT
Start: 2019-05-16 | End: 2019-05-20

## 2019-05-16 RX ORDER — DEXTROSE 50 % IN WATER 50 %
25 SYRINGE (ML) INTRAVENOUS ONCE
Refills: 0 | Status: DISCONTINUED | OUTPATIENT
Start: 2019-05-16 | End: 2019-05-23

## 2019-05-16 RX ORDER — FENTANYL CITRATE 50 UG/ML
25 INJECTION INTRAVENOUS
Refills: 0 | Status: DISCONTINUED | OUTPATIENT
Start: 2019-05-16 | End: 2019-05-16

## 2019-05-16 RX ORDER — HEPARIN SODIUM 5000 [USP'U]/ML
10000 INJECTION INTRAVENOUS; SUBCUTANEOUS EVERY 6 HOURS
Refills: 0 | Status: DISCONTINUED | OUTPATIENT
Start: 2019-05-16 | End: 2019-05-16

## 2019-05-16 RX ORDER — HEPARIN SODIUM 5000 [USP'U]/ML
INJECTION INTRAVENOUS; SUBCUTANEOUS
Qty: 25000 | Refills: 0 | Status: DISCONTINUED | OUTPATIENT
Start: 2019-05-16 | End: 2019-05-17

## 2019-05-16 RX ORDER — ONDANSETRON 8 MG/1
4 TABLET, FILM COATED ORAL ONCE
Refills: 0 | Status: DISCONTINUED | OUTPATIENT
Start: 2019-05-16 | End: 2019-05-16

## 2019-05-16 RX ORDER — INSULIN LISPRO 100/ML
VIAL (ML) SUBCUTANEOUS
Refills: 0 | Status: DISCONTINUED | OUTPATIENT
Start: 2019-05-16 | End: 2019-05-23

## 2019-05-16 RX ORDER — FUROSEMIDE 40 MG
40 TABLET ORAL
Refills: 0 | Status: DISCONTINUED | OUTPATIENT
Start: 2019-05-16 | End: 2019-05-23

## 2019-05-16 RX ORDER — DEXTROSE 50 % IN WATER 50 %
12.5 SYRINGE (ML) INTRAVENOUS ONCE
Refills: 0 | Status: DISCONTINUED | OUTPATIENT
Start: 2019-05-16 | End: 2019-05-23

## 2019-05-16 RX ORDER — DEXTROSE 50 % IN WATER 50 %
15 SYRINGE (ML) INTRAVENOUS ONCE
Refills: 0 | Status: DISCONTINUED | OUTPATIENT
Start: 2019-05-16 | End: 2019-05-23

## 2019-05-16 RX ORDER — GABAPENTIN 400 MG/1
300 CAPSULE ORAL THREE TIMES A DAY
Refills: 0 | Status: DISCONTINUED | OUTPATIENT
Start: 2019-05-16 | End: 2019-05-23

## 2019-05-16 RX ORDER — HEPARIN SODIUM 5000 [USP'U]/ML
INJECTION INTRAVENOUS; SUBCUTANEOUS
Qty: 25000 | Refills: 0 | Status: DISCONTINUED | OUTPATIENT
Start: 2019-05-16 | End: 2019-05-16

## 2019-05-16 RX ORDER — MONTELUKAST 4 MG/1
10 TABLET, CHEWABLE ORAL DAILY
Refills: 0 | Status: DISCONTINUED | OUTPATIENT
Start: 2019-05-16 | End: 2019-05-23

## 2019-05-16 RX ORDER — SODIUM CHLORIDE 9 MG/ML
250 INJECTION INTRAMUSCULAR; INTRAVENOUS; SUBCUTANEOUS
Refills: 0 | Status: DISCONTINUED | OUTPATIENT
Start: 2019-05-16 | End: 2019-05-17

## 2019-05-16 RX ORDER — DILTIAZEM HCL 120 MG
120 CAPSULE, EXT RELEASE 24 HR ORAL DAILY
Refills: 0 | Status: DISCONTINUED | OUTPATIENT
Start: 2019-05-16 | End: 2019-05-23

## 2019-05-16 RX ORDER — ONDANSETRON 8 MG/1
4 TABLET, FILM COATED ORAL EVERY 6 HOURS
Refills: 0 | Status: DISCONTINUED | OUTPATIENT
Start: 2019-05-16 | End: 2019-05-23

## 2019-05-16 RX ORDER — VANCOMYCIN HCL 1 G
1000 VIAL (EA) INTRAVENOUS EVERY 8 HOURS
Refills: 0 | Status: DISCONTINUED | OUTPATIENT
Start: 2019-05-16 | End: 2019-05-17

## 2019-05-16 RX ORDER — SODIUM CHLORIDE 9 MG/ML
1000 INJECTION, SOLUTION INTRAVENOUS
Refills: 0 | Status: DISCONTINUED | OUTPATIENT
Start: 2019-05-16 | End: 2019-05-23

## 2019-05-16 RX ORDER — HYDROMORPHONE HYDROCHLORIDE 2 MG/ML
4 INJECTION INTRAMUSCULAR; INTRAVENOUS; SUBCUTANEOUS EVERY 4 HOURS
Refills: 0 | Status: DISCONTINUED | OUTPATIENT
Start: 2019-05-16 | End: 2019-05-23

## 2019-05-16 RX ORDER — VANCOMYCIN HCL 1 G
1000 VIAL (EA) INTRAVENOUS EVERY 12 HOURS
Refills: 0 | Status: DISCONTINUED | OUTPATIENT
Start: 2019-05-16 | End: 2019-05-16

## 2019-05-16 RX ORDER — HEPARIN SODIUM 5000 [USP'U]/ML
5000 INJECTION INTRAVENOUS; SUBCUTANEOUS EVERY 6 HOURS
Refills: 0 | Status: DISCONTINUED | OUTPATIENT
Start: 2019-05-16 | End: 2019-05-16

## 2019-05-16 RX ORDER — GLUCAGON INJECTION, SOLUTION 0.5 MG/.1ML
1 INJECTION, SOLUTION SUBCUTANEOUS ONCE
Refills: 0 | Status: DISCONTINUED | OUTPATIENT
Start: 2019-05-16 | End: 2019-05-23

## 2019-05-16 RX ORDER — SODIUM CHLORIDE 9 MG/ML
1000 INJECTION, SOLUTION INTRAVENOUS
Refills: 0 | Status: DISCONTINUED | OUTPATIENT
Start: 2019-05-16 | End: 2019-05-16

## 2019-05-16 RX ORDER — SIMVASTATIN 20 MG/1
10 TABLET, FILM COATED ORAL AT BEDTIME
Refills: 0 | Status: DISCONTINUED | OUTPATIENT
Start: 2019-05-16 | End: 2019-05-23

## 2019-05-16 RX ORDER — HYDROMORPHONE HYDROCHLORIDE 2 MG/ML
0.25 INJECTION INTRAMUSCULAR; INTRAVENOUS; SUBCUTANEOUS
Refills: 0 | Status: DISCONTINUED | OUTPATIENT
Start: 2019-05-16 | End: 2019-05-16

## 2019-05-16 RX ORDER — HYDROMORPHONE HYDROCHLORIDE 2 MG/ML
1 INJECTION INTRAMUSCULAR; INTRAVENOUS; SUBCUTANEOUS
Refills: 0 | Status: DISCONTINUED | OUTPATIENT
Start: 2019-05-16 | End: 2019-05-23

## 2019-05-16 RX ORDER — ACETAMINOPHEN 500 MG
650 TABLET ORAL EVERY 6 HOURS
Refills: 0 | Status: DISCONTINUED | OUTPATIENT
Start: 2019-05-16 | End: 2019-05-23

## 2019-05-16 RX ORDER — METOPROLOL TARTRATE 50 MG
100 TABLET ORAL DAILY
Refills: 0 | Status: DISCONTINUED | OUTPATIENT
Start: 2019-05-16 | End: 2019-05-23

## 2019-05-16 RX ADMIN — MAGNESIUM OXIDE 400 MG ORAL TABLET 400 MILLIGRAM(S): 241.3 TABLET ORAL at 13:57

## 2019-05-16 RX ADMIN — SODIUM CHLORIDE 125 MILLILITER(S): 9 INJECTION INTRAMUSCULAR; INTRAVENOUS; SUBCUTANEOUS at 02:59

## 2019-05-16 RX ADMIN — Medication 250 MILLIGRAM(S): at 22:03

## 2019-05-16 RX ADMIN — HEPARIN SODIUM 2200 UNIT(S)/HR: 5000 INJECTION INTRAVENOUS; SUBCUTANEOUS at 18:02

## 2019-05-16 RX ADMIN — HEPARIN SODIUM 2200 UNIT(S)/HR: 5000 INJECTION INTRAVENOUS; SUBCUTANEOUS at 03:02

## 2019-05-16 RX ADMIN — Medication 100 MILLIGRAM(S): at 22:03

## 2019-05-16 RX ADMIN — MONTELUKAST 10 MILLIGRAM(S): 4 TABLET, CHEWABLE ORAL at 17:56

## 2019-05-16 RX ADMIN — HYDROMORPHONE HYDROCHLORIDE 4 MILLIGRAM(S): 2 INJECTION INTRAMUSCULAR; INTRAVENOUS; SUBCUTANEOUS at 02:30

## 2019-05-16 RX ADMIN — GABAPENTIN 300 MILLIGRAM(S): 400 CAPSULE ORAL at 13:57

## 2019-05-16 RX ADMIN — SODIUM CHLORIDE 125 MILLILITER(S): 9 INJECTION INTRAMUSCULAR; INTRAVENOUS; SUBCUTANEOUS at 16:53

## 2019-05-16 RX ADMIN — SODIUM CHLORIDE 125 MILLILITER(S): 9 INJECTION INTRAMUSCULAR; INTRAVENOUS; SUBCUTANEOUS at 22:04

## 2019-05-16 RX ADMIN — HYDROMORPHONE HYDROCHLORIDE 4 MILLIGRAM(S): 2 INJECTION INTRAMUSCULAR; INTRAVENOUS; SUBCUTANEOUS at 06:02

## 2019-05-16 RX ADMIN — HYDROMORPHONE HYDROCHLORIDE 4 MILLIGRAM(S): 2 INJECTION INTRAMUSCULAR; INTRAVENOUS; SUBCUTANEOUS at 18:00

## 2019-05-16 RX ADMIN — ONDANSETRON 4 MILLIGRAM(S): 8 TABLET, FILM COATED ORAL at 07:50

## 2019-05-16 RX ADMIN — Medication 100 MILLIGRAM(S): at 17:57

## 2019-05-16 RX ADMIN — Medication 40 MILLIGRAM(S): at 05:07

## 2019-05-16 RX ADMIN — GABAPENTIN 300 MILLIGRAM(S): 400 CAPSULE ORAL at 22:04

## 2019-05-16 RX ADMIN — SIMVASTATIN 10 MILLIGRAM(S): 20 TABLET, FILM COATED ORAL at 22:04

## 2019-05-16 RX ADMIN — Medication 100 MILLIGRAM(S): at 17:56

## 2019-05-16 RX ADMIN — Medication 100 MILLIGRAM(S): at 05:08

## 2019-05-16 RX ADMIN — Medication 120 MILLIGRAM(S): at 17:56

## 2019-05-16 RX ADMIN — HYDROMORPHONE HYDROCHLORIDE 0.25 MILLIGRAM(S): 2 INJECTION INTRAMUSCULAR; INTRAVENOUS; SUBCUTANEOUS at 11:33

## 2019-05-16 RX ADMIN — HYDROMORPHONE HYDROCHLORIDE 4 MILLIGRAM(S): 2 INJECTION INTRAMUSCULAR; INTRAVENOUS; SUBCUTANEOUS at 22:16

## 2019-05-16 RX ADMIN — HYDROMORPHONE HYDROCHLORIDE 4 MILLIGRAM(S): 2 INJECTION INTRAMUSCULAR; INTRAVENOUS; SUBCUTANEOUS at 07:02

## 2019-05-16 RX ADMIN — Medication 100 MILLIGRAM(S): at 05:06

## 2019-05-16 RX ADMIN — Medication 40 MILLIGRAM(S): at 17:56

## 2019-05-16 RX ADMIN — HYDROMORPHONE HYDROCHLORIDE 0.25 MILLIGRAM(S): 2 INJECTION INTRAMUSCULAR; INTRAVENOUS; SUBCUTANEOUS at 11:48

## 2019-05-16 RX ADMIN — GABAPENTIN 300 MILLIGRAM(S): 400 CAPSULE ORAL at 05:08

## 2019-05-16 RX ADMIN — Medication 120 MILLIGRAM(S): at 05:08

## 2019-05-16 RX ADMIN — Medication 250 MILLIGRAM(S): at 13:58

## 2019-05-16 RX ADMIN — HYDROMORPHONE HYDROCHLORIDE 4 MILLIGRAM(S): 2 INJECTION INTRAMUSCULAR; INTRAVENOUS; SUBCUTANEOUS at 12:16

## 2019-05-16 RX ADMIN — HYDROMORPHONE HYDROCHLORIDE 4 MILLIGRAM(S): 2 INJECTION INTRAMUSCULAR; INTRAVENOUS; SUBCUTANEOUS at 23:17

## 2019-05-16 RX ADMIN — Medication 20 MILLIGRAM(S): at 17:56

## 2019-05-16 RX ADMIN — HEPARIN SODIUM 5000 UNIT(S): 5000 INJECTION INTRAVENOUS; SUBCUTANEOUS at 02:58

## 2019-05-16 RX ADMIN — HYDROMORPHONE HYDROCHLORIDE 4 MILLIGRAM(S): 2 INJECTION INTRAMUSCULAR; INTRAVENOUS; SUBCUTANEOUS at 01:34

## 2019-05-16 NOTE — CONSULT NOTE ADULT - ATTENDING COMMENTS
Agree with Dr. Collins's recommendations.    Patient meets criteria for DC Home if able to achieve DC HOME safely. Otherwise, meets criteria for EMANI.     Will sign off, please reconsult for additional rehab dispo needs if functional status changes.
pre-operative cardiovascular risk evaluation and management . cardiac symptoms. Risk factors for coronary artery disease . scheduled for LE angiography. Will do ischemic work up before the procedure.

## 2019-05-16 NOTE — BRIEF OPERATIVE NOTE - NSICDXBRIEFPOSTOP_GEN_ALL_CORE_FT
POST-OP DIAGNOSIS:  Chronic venous stasis dermatitis of left lower extremity 16-May-2019 11:35:19  Estefania Smith

## 2019-05-16 NOTE — BRIEF OPERATIVE NOTE - NSICDXBRIEFPREOP_GEN_ALL_CORE_FT
PRE-OP DIAGNOSIS:  Chronic venous stasis dermatitis of left lower extremity 16-May-2019 11:35:06  Estefania Smith

## 2019-05-16 NOTE — CHART NOTE - NSCHARTNOTEFT_GEN_A_CORE
Patient seen at bedside. VSS, afebrile. Pain well controlled on current regimen of dilaudid 2 mg, 4 mg. Tolerating a regular diet without nausea/vomiting. Voiding spontaneously. No concerns at this time    Physical exam:  Resting in bed  Non-laboured breathing  Symmetrical chest rise  Obese, abdomen soft/nondistended/nontender  Nonpalpable DP/PT bilateral pulses, LLE wrapped with ACER bandage - dressing c/d/i    68F with chronic venous stasis dermatitis now s/p LLE debridement and washout  - Dressing: adaptic soaked in betadine, kerlix, ABD pads, ACE wrap  - Diabetic diet/NPO at midnight for LE angio 5/17  - pain control  - f/u angio  - restart heparin gtt tonight at 6PM  - f/u PT/PMR  - dispo planning

## 2019-05-16 NOTE — DISCHARGE NOTE NURSING/CASE MANAGEMENT/SOCIAL WORK - NSDCDPATPORTLINK_GEN_ALL_CORE
You can access the [facility], offered by [facility], by registering with the following website: [email or web address]

## 2019-05-16 NOTE — CONSULT NOTE ADULT - SUBJECTIVE AND OBJECTIVE BOX
HPI:  MS. Kirby is a 68 year old female with PMHx of obesity, HTN, dyslipidemia, diastolic CHF, A-fib (on coumadin), chronic venous insufficiency, DM type 2 with neuropathy, thyroid nodule, ulcerative colitis who was admitted to [facility] on 5/12/19 for worsening left lower extremity erythema and pain. She has a history of severe chronic venous insufficiency with multiple previous admissions for bilateral leg ulcers and cellulitis and was previously hospitalized for 3 weeks ago at [facility] for similar symptoms. She was discharged home one week ago and now presents with 2 days of increasing left leg swelling, weeping and burning pain to left lower extremity. She was transitioned to Heparin drip and underwent left lower extremity debridement on 5/16/19 after cardiac clearance. There are plans for lower extremity angiogram on Friday.     REVIEW OF SYSTEMS    PAST MEDICAL & SURGICAL HISTORY  Peripheral venous insufficiency  Morbid obesity  Ulcerative colitis  Congestive heart failure  Atrial fibrillation on Coumadin at home  Diabetes mellitus type II with neuropathy  Hypertension  s/p left knee arthroscopy for  medial meniscus repair     SOCIAL HISTORY  Smoking - Denied  EtOH - Denied   Drugs - Denied    FAMILY HISTORY   Reviewed and non-contributory    ALLERGIES  Penicillin    VITALS  T(C): 37.6 (05-16-19 @ 11:33)  T(F): 99.7 (05-16-19 @ 11:33), Max: 100 (05-16-19 @ 07:59)  HR: 85 (05-16-19 @ 12:48) (85 - 110)  BP: 129/82 (05-16-19 @ 12:48) (93/60 - 132/69)  RR:  (12 - 18)  SpO2:  (91% - 99%)  Wt(kg): --    FUNCTIONAL HISTORY  _______ hand dominant  Lives with ______ in a ______ with ___ SHARON + HR and ___ STI + HR  Prior to hospitalization patient was utilizing RW and SAC for assistance with ambulation    CURRENT FUNCTIONAL STATUS  Pending full bedside functional evaluation     RECENT LABS/IMAGING             10.5   9.5   )-----------( 364      ( 16 May 2019 03:45 )             33.1     136  |  96<L>  |  4.0<L>  ----------------------------<  127<H>  3.1<L>   |  29.0  |  0.45<L>    Ca    8.5<L>      16 May 2019 03:45  Phos  3.4     05-16  Mg     1.6     05-16    PT/INR - ( 16 May 2019 03:45 )   PT: 19.7 sec;   INR: 1.69 ratio    PTT - ( 16 May 2019 02:24 )  PTT:49.4 sec    MEDICATIONS   MEDICATIONS  (STANDING):  clindamycin IVPB 900 milliGRAM(s) IV Intermittent every 8 hours  dextrose 5%. 1000 milliLiter(s) (50 mL/Hr) IV Continuous <Continuous>  dextrose 50% Injectable 12.5 Gram(s) IV Push once  dextrose 50% Injectable 25 Gram(s) IV Push once  dextrose 50% Injectable 25 Gram(s) IV Push once  dicyclomine 20 milliGRAM(s) Oral before breakfast  diltiazem    Tablet 120 milliGRAM(s) Oral daily  furosemide    Tablet 40 milliGRAM(s) Oral two times a day  gabapentin 300 milliGRAM(s) Oral three times a day  heparin  Infusion.  Unit(s)/Hr (22 mL/Hr) IV Continuous <Continuous>  insulin lispro (HumaLOG) corrective regimen sliding scale   SubCutaneous three times a day before meals  lactated ringers. 1000 milliLiter(s) (100 mL/Hr) IV Continuous <Continuous>  magnesium oxide 400 milliGRAM(s) Oral daily  metoprolol succinate  milliGRAM(s) Oral daily  montelukast 10 milliGRAM(s) Oral daily  simvastatin 10 milliGRAM(s) Oral at bedtime  sodium chloride 0.9%. 250 milliLiter(s) (125 mL/Hr) IV Continuous <Continuous>  vancomycin  IVPB 1000 milliGRAM(s) IV Intermittent every 8 hours    MEDICATIONS  (PRN):  acetaminophen   Tablet .. 650 milliGRAM(s) Oral every 6 hours PRN Temp greater or equal to 38C (100.4F), Mild Pain (1 - 3)  dextrose 40% Gel 15 Gram(s) Oral once PRN Blood Glucose LESS THAN 70 milliGRAM(s)/deciliter  fentaNYL    Injectable 25 MICROGram(s) IV Push every 5 minutes PRN Moderate Pain (4 - 6)  glucagon  Injectable 1 milliGRAM(s) IntraMuscular once PRN Glucose LESS THAN 70 milligrams/deciliter  HYDROmorphone   Tablet 2 milliGRAM(s) Oral every 4 hours PRN Moderate Pain (4 - 6)  HYDROmorphone   Tablet 4 milliGRAM(s) Oral every 4 hours PRN Severe Pain (7 - 10)  HYDROmorphone  Injectable 1 milliGRAM(s) IV Push every 3 hours PRN Severe Pain (7 - 10), breakthru  HYDROmorphone  Injectable 0.25 milliGRAM(s) IV Push every 10 minutes PRN Moderate Pain (4 - 6)  ondansetron Injectable 4 milliGRAM(s) IV Push once PRN Nausea and/or Vomiting  ondansetron Injectable 4 milliGRAM(s) IV Push every 6 hours PRN Nausea and/or Vomiting    PHYSICAL EXAM    ASSESSMENT/PLAN  69 y/o female with chronic bilateral lower extremity wounds s/p left lower extremity debridement.     Chronic bilateral lower extremity wounds s/p left lower extremity debridement - Clindamycin, Vancomycin. Plans for angiogram tomorrow  Atrial fibrillation - Metoprolol, Diltiazem, Heparin drip  H/O congestive heart failure - Metoprolol, Lasix  Pain control - Fentanyl, Tylenol, Gabapentin, Dilaudid  Hypokalemia - Supplement and monitor    Rehab - Full bedside functional evaluation pending.   Precautions - Falls, Cardiac  Weight bearing status - WBAT HPI:  MS. Kirby is a 68 year old female with PMHx of obesity, HTN, dyslipidemia, diastolic CHF, A-fib (on coumadin), chronic venous insufficiency, DM type 2 with neuropathy, thyroid nodule, ulcerative colitis who was admitted to [facility] on 5/12/19 for worsening left lower extremity erythema and pain. She has a history of severe chronic venous insufficiency with multiple previous admissions for bilateral leg ulcers and cellulitis and was previously hospitalized for 3 weeks ago at [facility] for similar symptoms. She was discharged home one week ago and now presents with 2 days of increasing left leg swelling, weeping and burning pain to left lower extremity. She was transitioned to Heparin drip and underwent left lower extremity debridement on 5/16/19 after cardiac clearance. There are plans for lower extremity angiogram on Friday.     REVIEW OF SYSTEMS  Constitutional - +fever, No fatigue  HEENT - No visual disturbances, No difficulty hearing,  No neck pain  Respiratory - No cough, No wheezing, No shortness of breath  Cardiovascular - No chest pain, No palpitations  Gastrointestinal - No abdominal pain, No nausea, No vomiting, No diarrhea, No constipation  Genitourinary - No dysuria, No frequency, No hematuria, No incontinence  Neurological - No headaches, +loss of strength, +numbness  Musculoskeletal - No joint pain, No joint swelling, +muscle pain  Psychiatric - No depression, No anxiety  All other review of systems negative    PAST MEDICAL & SURGICAL HISTORY  Peripheral venous insufficiency  Morbid obesity  Ulcerative colitis  Congestive heart failure  Atrial fibrillation on Coumadin at home  Diabetes mellitus type II with neuropathy  Hypertension  s/p left knee arthroscopy for  medial meniscus repair     SOCIAL HISTORY    Works in a [employer] - sitting most of the day  Smoking - Former - quit 30-40 years ago  EtOH - Denied   Drugs - Denied    FAMILY HISTORY   Reviewed and non-contributory    ALLERGIES  Penicillin (as a kid)    VITALS  T(C): 37.6 (05-16-19 @ 11:33)  T(F): 99.7 (05-16-19 @ 11:33), Max: 100 (05-16-19 @ 07:59)  HR: 85 (05-16-19 @ 12:48) (85 - 110)  BP: 129/82 (05-16-19 @ 12:48) (93/60 - 132/69)  RR:  (12 - 18)  SpO2:  (91% - 99%)  Wt(kg): --    FUNCTIONAL HISTORY  Ambidextrous   Lives with spouse in a private home with 3STE and 12-14STI  Prior to hospitalization patient was utilizing RW and SAC for assistance with ambulation  Previously driving    CURRENT FUNCTIONAL STATUS  Pending full bedside functional evaluation     RECENT LABS/IMAGING             10.5   9.5   )-----------( 364      ( 16 May 2019 03:45 )             33.1     136  |  96<L>  |  4.0<L>  ----------------------------<  127<H>  3.1<L>   |  29.0  |  0.45<L>    Ca    8.5<L>      16 May 2019 03:45  Phos  3.4     05-16  Mg     1.6     05-16    PT/INR - ( 16 May 2019 03:45 )   PT: 19.7 sec;   INR: 1.69 ratio    PTT - ( 16 May 2019 02:24 )  PTT:49.4 sec    MEDICATIONS   MEDICATIONS  (STANDING):  clindamycin IVPB 900 milliGRAM(s) IV Intermittent every 8 hours  dextrose 5%. 1000 milliLiter(s) (50 mL/Hr) IV Continuous <Continuous>  dextrose 50% Injectable 12.5 Gram(s) IV Push once  dextrose 50% Injectable 25 Gram(s) IV Push once  dextrose 50% Injectable 25 Gram(s) IV Push once  dicyclomine 20 milliGRAM(s) Oral before breakfast  diltiazem    Tablet 120 milliGRAM(s) Oral daily  furosemide    Tablet 40 milliGRAM(s) Oral two times a day  gabapentin 300 milliGRAM(s) Oral three times a day  heparin  Infusion.  Unit(s)/Hr (22 mL/Hr) IV Continuous <Continuous>  insulin lispro (HumaLOG) corrective regimen sliding scale   SubCutaneous three times a day before meals  lactated ringers. 1000 milliLiter(s) (100 mL/Hr) IV Continuous <Continuous>  magnesium oxide 400 milliGRAM(s) Oral daily  metoprolol succinate  milliGRAM(s) Oral daily  montelukast 10 milliGRAM(s) Oral daily  simvastatin 10 milliGRAM(s) Oral at bedtime  sodium chloride 0.9%. 250 milliLiter(s) (125 mL/Hr) IV Continuous <Continuous>  vancomycin  IVPB 1000 milliGRAM(s) IV Intermittent every 8 hours    MEDICATIONS  (PRN):  acetaminophen   Tablet .. 650 milliGRAM(s) Oral every 6 hours PRN Temp greater or equal to 38C (100.4F), Mild Pain (1 - 3)  dextrose 40% Gel 15 Gram(s) Oral once PRN Blood Glucose LESS THAN 70 milliGRAM(s)/deciliter  fentaNYL    Injectable 25 MICROGram(s) IV Push every 5 minutes PRN Moderate Pain (4 - 6)  glucagon  Injectable 1 milliGRAM(s) IntraMuscular once PRN Glucose LESS THAN 70 milligrams/deciliter  HYDROmorphone   Tablet 2 milliGRAM(s) Oral every 4 hours PRN Moderate Pain (4 - 6)  HYDROmorphone   Tablet 4 milliGRAM(s) Oral every 4 hours PRN Severe Pain (7 - 10)  HYDROmorphone  Injectable 1 milliGRAM(s) IV Push every 3 hours PRN Severe Pain (7 - 10), breakthru  HYDROmorphone  Injectable 0.25 milliGRAM(s) IV Push every 10 minutes PRN Moderate Pain (4 - 6)  ondansetron Injectable 4 milliGRAM(s) IV Push once PRN Nausea and/or Vomiting  ondansetron Injectable 4 milliGRAM(s) IV Push every 6 hours PRN Nausea and/or Vomiting    PHYSICAL EXAM  Constitutional - NAD, Comfortable  HEENT - NCAT, EOMI  Chest - +Supplemental O2, No increased work of breathing  Cardiovascular - All extremities well perfused, S1S2  Abdomen - Obese, Soft, Non-distended  Extremities - Left lower extremity ACE wrapped from mid thigh to foot  Neurologic Exam -                    Cognitive - Awake, Alert, Oriented to self, place, date, year, situation     Cranial Nerves - CN 2-12 grossly intact     Motor - Able to get right ankle dorsiflexion to neutral                    LEFT    UE - ShAB 5/5, EF 5/5, EE 5/5, WE 5/5,  5/5                    RIGHT UE - ShAB 5/5, EF 5/5, EE 5/5, WE 5/5,  5/5                    LEFT    LE - HF 4/5                    RIGHT LE - HF 4/5, KE 5/5, DF 5/5, PF 5/5        Sensory - Intact to light touch diffusely     Coordination - Finger-to-nose intact bilaterally   Psychiatric - Affect WNL    ASSESSMENT/PLAN  69 y/o female with chronic bilateral lower extremity wounds s/p left lower extremity debridement.     Chronic bilateral lower extremity wounds s/p left lower extremity debridement - Clindamycin, Vancomycin. Plans for angiogram tomorrow  Atrial fibrillation - Metoprolol, Diltiazem, Heparin drip  H/O congestive heart failure - Metoprolol, Lasix  Pain control - Fentanyl, Tylenol, Gabapentin, Dilaudid  Hypokalemia - Supplement and monitor    Rehab - Full bedside functional evaluation pending however patient at this time wishes to go home after medical and surgical work up completed. If patient does end up requiring and agreeing to go to inpatient rehabilitation, she would benefit from a course of rehabilitation at a subacute rehabilitation facility.   Precautions - Falls, Cardiac  Weight bearing status - WBAT

## 2019-05-16 NOTE — CONSULT NOTE ADULT - REASON FOR ADMISSION
chronic statis dermatitis and lipodermatosclerosis
chronic statis dermatitis and lipodermatosclerosis

## 2019-05-16 NOTE — PROGRESS NOTE ADULT - SUBJECTIVE AND OBJECTIVE BOX
History:    Patient seen this am.  Bilateral lower extremity chronic dermatitis. Left > Right.  Complaining of lower ext pain.  Remains on Hep ggt.  Denies nausea, vomiting, chest pain, shortness of breath, abdominal pain or fever.  No acute motor or sensory changes are reported.    Vital Signs Last 24 Hrs  T(C): 37.7 (16 May 2019 08:52), Max: 37.8 (16 May 2019 07:59)  T(F): 99.8 (16 May 2019 08:52), Max: 100 (16 May 2019 07:59)  HR: 90 (16 May 2019 08:52) (87 - 110)  BP: 115/70 (16 May 2019 08:52) (93/60 - 119/66)  BP(mean): --  RR: 18 (16 May 2019 08:52) (16 - 18)  SpO2: 93% (16 May 2019 08:52) (91% - 98%)                           10.5   9.5   )-----------( 364      ( 16 May 2019 03:45 )             33.1   05-16    136  |  96<L>  |  4.0<L>  ----------------------------<  127<H>  3.1<L>   |  29.0  |  0.45<L>    Ca    8.5<L>      16 May 2019 03:45  Phos  3.4     05-16  Mg     1.6     05-16    PT/INR - ( 16 May 2019 03:45 )   PT: 19.7 sec;   INR: 1.69 ratio    PTT - ( 16 May 2019 02:24 )  PTT:49.4 sec    MEDICATIONS  (STANDING):  aspirin  chewable 81 milliGRAM(s) Oral daily  clindamycin IVPB 900 milliGRAM(s) IV Intermittent every 8 hours  dextrose 5%. 1000 milliLiter(s) (50 mL/Hr) IV Continuous <Continuous>  dextrose 50% Injectable 12.5 Gram(s) IV Push once  dextrose 50% Injectable 25 Gram(s) IV Push once  dextrose 50% Injectable 25 Gram(s) IV Push once  dicyclomine 20 milliGRAM(s) Oral before breakfast  diltiazem    Tablet 120 milliGRAM(s) Oral daily  furosemide    Tablet 40 milliGRAM(s) Oral two times a day  gabapentin 300 milliGRAM(s) Oral three times a day  heparin  Infusion.  Unit(s)/Hr (22 mL/Hr) IV Continuous <Continuous>  insulin lispro (HumaLOG) corrective regimen sliding scale   SubCutaneous three times a day before meals  magnesium oxide 400 milliGRAM(s) Oral daily  metoprolol succinate  milliGRAM(s) Oral daily  montelukast 10 milliGRAM(s) Oral daily  simvastatin 10 milliGRAM(s) Oral at bedtime  vancomycin  IVPB 1000 milliGRAM(s) IV Intermittent every 12 hours  vancomycin  IVPB        MEDICATIONS  (PRN):  acetaminophen   Tablet .. 650 milliGRAM(s) Oral every 6 hours PRN Temp greater or equal to 38C (100.4F), Mild Pain (1 - 3)  dextrose 40% Gel 15 Gram(s) Oral once PRN Blood Glucose LESS THAN 70 milliGRAM(s)/deciliter  glucagon  Injectable 1 milliGRAM(s) IntraMuscular once PRN Glucose LESS THAN 70 milligrams/deciliter  HYDROmorphone  Injectable 1 milliGRAM(s) IV Push every 4 hours PRN Moderate Pain (4 - 6)  HYDROmorphone  Injectable 0.5 milliGRAM(s) IV Push every 4 hours PRN Mild Pain (1 - 3)  ondansetron Injectable 4 milliGRAM(s) IV Push every 6 hours PRN Nausea and/or Vomiting      Physical exam:   No acute distress  Non labored breathing  Abdomen is obese, soft.  pedals non palpable   Dressing to the bilateral lower extremities CDI  No foot drop.  No gross motor deficits.   Capillary refill is less than 2 seconds. No cyanosis.    Assessment:  • Chronic bilateral hyperpigmentation, left lower extremity stasis ulcerations with a likely underlying arterial component     •   Plan:   • NPO p MN maintained for debridement in OR today  - diagnostic angiogram tomorrow

## 2019-05-16 NOTE — BRIEF OPERATIVE NOTE - NSICDXBRIEFPROCEDURE_GEN_ALL_CORE_FT
PROCEDURES:  Pulsed irrigation of wound 16-May-2019 11:34:34  Estefania Smith  Wound debridement 16-May-2019 11:33:48  Estefania Smith

## 2019-05-17 LAB
ANION GAP SERPL CALC-SCNC: 10 MMOL/L — SIGNIFICANT CHANGE UP (ref 5–17)
APTT BLD: 32.3 SEC — SIGNIFICANT CHANGE UP (ref 27.5–36.3)
APTT BLD: 57.4 SEC — HIGH (ref 27.5–36.3)
BASOPHILS # BLD AUTO: 0 K/UL — SIGNIFICANT CHANGE UP (ref 0–0.2)
BASOPHILS NFR BLD AUTO: 0.5 % — SIGNIFICANT CHANGE UP (ref 0–2)
BUN SERPL-MCNC: 7 MG/DL — LOW (ref 8–20)
CALCIUM SERPL-MCNC: 8.3 MG/DL — LOW (ref 8.6–10.2)
CHLORIDE SERPL-SCNC: 96 MMOL/L — LOW (ref 98–107)
CO2 SERPL-SCNC: 29 MMOL/L — SIGNIFICANT CHANGE UP (ref 22–29)
CREAT SERPL-MCNC: 0.55 MG/DL — SIGNIFICANT CHANGE UP (ref 0.5–1.3)
EOSINOPHIL # BLD AUTO: 0.3 K/UL — SIGNIFICANT CHANGE UP (ref 0–0.5)
EOSINOPHIL NFR BLD AUTO: 3.7 % — SIGNIFICANT CHANGE UP (ref 0–6)
GLUCOSE SERPL-MCNC: 139 MG/DL — HIGH (ref 70–115)
HCT VFR BLD CALC: 30.9 % — LOW (ref 37–47)
HCT VFR BLD CALC: 32.4 % — LOW (ref 37–47)
HGB BLD-MCNC: 10 G/DL — LOW (ref 12–16)
HGB BLD-MCNC: 9.8 G/DL — LOW (ref 12–16)
LYMPHOCYTES # BLD AUTO: 1.1 K/UL — SIGNIFICANT CHANGE UP (ref 1–4.8)
LYMPHOCYTES # BLD AUTO: 15.1 % — LOW (ref 20–55)
MAGNESIUM SERPL-MCNC: 1.6 MG/DL — SIGNIFICANT CHANGE UP (ref 1.6–2.6)
MCHC RBC-ENTMCNC: 29.1 PG — SIGNIFICANT CHANGE UP (ref 27–31)
MCHC RBC-ENTMCNC: 29.7 PG — SIGNIFICANT CHANGE UP (ref 27–31)
MCHC RBC-ENTMCNC: 30.9 G/DL — LOW (ref 32–36)
MCHC RBC-ENTMCNC: 31.7 G/DL — LOW (ref 32–36)
MCV RBC AUTO: 93.6 FL — SIGNIFICANT CHANGE UP (ref 81–99)
MCV RBC AUTO: 94.2 FL — SIGNIFICANT CHANGE UP (ref 81–99)
MONOCYTES # BLD AUTO: 0.8 K/UL — SIGNIFICANT CHANGE UP (ref 0–0.8)
MONOCYTES NFR BLD AUTO: 10.3 % — HIGH (ref 3–10)
NEUTROPHILS # BLD AUTO: 5.3 K/UL — SIGNIFICANT CHANGE UP (ref 1.8–8)
NEUTROPHILS NFR BLD AUTO: 70 % — SIGNIFICANT CHANGE UP (ref 37–73)
PHOSPHATE SERPL-MCNC: 3.8 MG/DL — SIGNIFICANT CHANGE UP (ref 2.4–4.7)
PLATELET # BLD AUTO: 373 K/UL — SIGNIFICANT CHANGE UP (ref 150–400)
PLATELET # BLD AUTO: 381 K/UL — SIGNIFICANT CHANGE UP (ref 150–400)
POTASSIUM SERPL-MCNC: 3.3 MMOL/L — LOW (ref 3.5–5.3)
POTASSIUM SERPL-SCNC: 3.3 MMOL/L — LOW (ref 3.5–5.3)
RBC # BLD: 3.3 M/UL — LOW (ref 4.4–5.2)
RBC # BLD: 3.44 M/UL — LOW (ref 4.4–5.2)
RBC # FLD: 14.8 % — SIGNIFICANT CHANGE UP (ref 11–15.6)
RBC # FLD: 14.8 % — SIGNIFICANT CHANGE UP (ref 11–15.6)
SODIUM SERPL-SCNC: 135 MMOL/L — SIGNIFICANT CHANGE UP (ref 135–145)
VANCOMYCIN TROUGH SERPL-MCNC: 14.4 UG/ML — SIGNIFICANT CHANGE UP (ref 10–20)
WBC # BLD: 7.6 K/UL — SIGNIFICANT CHANGE UP (ref 4.8–10.8)
WBC # BLD: 7.7 K/UL — SIGNIFICANT CHANGE UP (ref 4.8–10.8)
WBC # FLD AUTO: 7.6 K/UL — SIGNIFICANT CHANGE UP (ref 4.8–10.8)
WBC # FLD AUTO: 7.7 K/UL — SIGNIFICANT CHANGE UP (ref 4.8–10.8)

## 2019-05-17 PROCEDURE — 99233 SBSQ HOSP IP/OBS HIGH 50: CPT

## 2019-05-17 PROCEDURE — 37228: CPT

## 2019-05-17 RX ORDER — HEPARIN SODIUM 5000 [USP'U]/ML
2500 INJECTION INTRAVENOUS; SUBCUTANEOUS
Qty: 25000 | Refills: 0 | Status: DISCONTINUED | OUTPATIENT
Start: 2019-05-17 | End: 2019-05-18

## 2019-05-17 RX ORDER — POTASSIUM CHLORIDE 20 MEQ
20 PACKET (EA) ORAL
Refills: 0 | Status: COMPLETED | OUTPATIENT
Start: 2019-05-17 | End: 2019-05-17

## 2019-05-17 RX ORDER — ASPIRIN/CALCIUM CARB/MAGNESIUM 324 MG
325 TABLET ORAL ONCE
Refills: 0 | Status: COMPLETED | OUTPATIENT
Start: 2019-05-17 | End: 2019-05-17

## 2019-05-17 RX ORDER — POTASSIUM CHLORIDE 20 MEQ
20 PACKET (EA) ORAL ONCE
Refills: 0 | Status: COMPLETED | OUTPATIENT
Start: 2019-05-17 | End: 2019-05-17

## 2019-05-17 RX ORDER — MAGNESIUM OXIDE 400 MG ORAL TABLET 241.3 MG
400 TABLET ORAL ONCE
Refills: 0 | Status: COMPLETED | OUTPATIENT
Start: 2019-05-17 | End: 2019-05-17

## 2019-05-17 RX ORDER — MAGNESIUM SULFATE 500 MG/ML
2 VIAL (ML) INJECTION ONCE
Refills: 0 | Status: COMPLETED | OUTPATIENT
Start: 2019-05-17 | End: 2019-05-17

## 2019-05-17 RX ORDER — POTASSIUM CHLORIDE 20 MEQ
40 PACKET (EA) ORAL
Refills: 0 | Status: COMPLETED | OUTPATIENT
Start: 2019-05-17 | End: 2019-05-17

## 2019-05-17 RX ORDER — FENTANYL CITRATE 50 UG/ML
25 INJECTION INTRAVENOUS ONCE
Refills: 0 | Status: DISCONTINUED | OUTPATIENT
Start: 2019-05-17 | End: 2019-05-17

## 2019-05-17 RX ORDER — APIXABAN 2.5 MG/1
1 TABLET, FILM COATED ORAL
Qty: 60 | Refills: 0
Start: 2019-05-17

## 2019-05-17 RX ORDER — COLLAGENASE CLOSTRIDIUM HIST. 250 UNIT/G
1 OINTMENT (GRAM) TOPICAL DAILY
Refills: 0 | Status: DISCONTINUED | OUTPATIENT
Start: 2019-05-17 | End: 2019-05-23

## 2019-05-17 RX ADMIN — HYDROMORPHONE HYDROCHLORIDE 1 MILLIGRAM(S): 2 INJECTION INTRAMUSCULAR; INTRAVENOUS; SUBCUTANEOUS at 14:50

## 2019-05-17 RX ADMIN — Medication 20 MILLIEQUIVALENT(S): at 19:19

## 2019-05-17 RX ADMIN — FENTANYL CITRATE 25 MICROGRAM(S): 50 INJECTION INTRAVENOUS at 11:00

## 2019-05-17 RX ADMIN — GABAPENTIN 300 MILLIGRAM(S): 400 CAPSULE ORAL at 04:58

## 2019-05-17 RX ADMIN — HEPARIN SODIUM 2500 UNIT(S)/HR: 5000 INJECTION INTRAVENOUS; SUBCUTANEOUS at 04:23

## 2019-05-17 RX ADMIN — Medication 20 MILLIEQUIVALENT(S): at 19:20

## 2019-05-17 RX ADMIN — Medication 20 MILLIEQUIVALENT(S): at 13:55

## 2019-05-17 RX ADMIN — FENTANYL CITRATE 25 MICROGRAM(S): 50 INJECTION INTRAVENOUS at 11:08

## 2019-05-17 RX ADMIN — Medication 40 MILLIEQUIVALENT(S): at 11:07

## 2019-05-17 RX ADMIN — Medication 40 MILLIGRAM(S): at 19:17

## 2019-05-17 RX ADMIN — Medication 40 MILLIEQUIVALENT(S): at 13:55

## 2019-05-17 RX ADMIN — HYDROMORPHONE HYDROCHLORIDE 4 MILLIGRAM(S): 2 INJECTION INTRAMUSCULAR; INTRAVENOUS; SUBCUTANEOUS at 19:23

## 2019-05-17 RX ADMIN — Medication 50 GRAM(S): at 11:07

## 2019-05-17 RX ADMIN — HYDROMORPHONE HYDROCHLORIDE 1 MILLIGRAM(S): 2 INJECTION INTRAMUSCULAR; INTRAVENOUS; SUBCUTANEOUS at 22:20

## 2019-05-17 RX ADMIN — MAGNESIUM OXIDE 400 MG ORAL TABLET 400 MILLIGRAM(S): 241.3 TABLET ORAL at 19:17

## 2019-05-17 RX ADMIN — GABAPENTIN 300 MILLIGRAM(S): 400 CAPSULE ORAL at 13:54

## 2019-05-17 RX ADMIN — Medication 650 MILLIGRAM(S): at 23:36

## 2019-05-17 RX ADMIN — Medication 325 MILLIGRAM(S): at 08:11

## 2019-05-17 RX ADMIN — HYDROMORPHONE HYDROCHLORIDE 4 MILLIGRAM(S): 2 INJECTION INTRAMUSCULAR; INTRAVENOUS; SUBCUTANEOUS at 13:55

## 2019-05-17 RX ADMIN — Medication 100 MILLIGRAM(S): at 04:59

## 2019-05-17 RX ADMIN — SIMVASTATIN 10 MILLIGRAM(S): 20 TABLET, FILM COATED ORAL at 21:54

## 2019-05-17 RX ADMIN — HYDROMORPHONE HYDROCHLORIDE 4 MILLIGRAM(S): 2 INJECTION INTRAMUSCULAR; INTRAVENOUS; SUBCUTANEOUS at 04:55

## 2019-05-17 RX ADMIN — MONTELUKAST 10 MILLIGRAM(S): 4 TABLET, CHEWABLE ORAL at 19:17

## 2019-05-17 RX ADMIN — HYDROMORPHONE HYDROCHLORIDE 1 MILLIGRAM(S): 2 INJECTION INTRAMUSCULAR; INTRAVENOUS; SUBCUTANEOUS at 21:54

## 2019-05-17 RX ADMIN — GABAPENTIN 300 MILLIGRAM(S): 400 CAPSULE ORAL at 21:54

## 2019-05-17 RX ADMIN — Medication 120 MILLIGRAM(S): at 06:08

## 2019-05-17 RX ADMIN — Medication 40 MILLIGRAM(S): at 04:59

## 2019-05-17 RX ADMIN — HEPARIN SODIUM 25 UNIT(S)/HR: 5000 INJECTION INTRAVENOUS; SUBCUTANEOUS at 17:17

## 2019-05-17 RX ADMIN — Medication 250 MILLIGRAM(S): at 04:59

## 2019-05-17 RX ADMIN — HYDROMORPHONE HYDROCHLORIDE 4 MILLIGRAM(S): 2 INJECTION INTRAMUSCULAR; INTRAVENOUS; SUBCUTANEOUS at 05:55

## 2019-05-17 RX ADMIN — MAGNESIUM OXIDE 400 MG ORAL TABLET 400 MILLIGRAM(S): 241.3 TABLET ORAL at 13:55

## 2019-05-17 NOTE — PROGRESS NOTE ADULT - SUBJECTIVE AND OBJECTIVE BOX
69 y/o obese F with PMHx HTN, dyslipidemia, Diastolic CHF, A.fib (on coumadin), chronic venous insufficiency, DM type 2, thyroid nodule, ulcerative colitis was admitted for worsening LLE erythema / pain. Patient has a history of severe chronic venous insufficiency with multiple previous admissions for bilateral leg ulcers and cellulitis; now s/p LLE via right groin approach (+mynx); L SFA Balloon with Dr. Carlson.    R groin pain+; all other ROS neg    Appearance: obese	  HEENT:   Normal oral mucosa, PERRL, EOMI	  Cardiovascular: Normal S1 S2, No JVD, No murmurs, No edema  Respiratory: Lungs clear to auscultation	  Psychiatry: A & O x 3, Mood & affect appropriate  Gastrointestinal:  Soft, Non-tender, + BS	  Neurologic: Non-focal  Extremities: Normal range of motion, No clubbing, cyanosis or edema  Vascular: Palpable pulses absent distally; left ACE in place      ASSESSMENT: Chronic bilateral hyperpigmentation, left lower extremity stasis ulcerations with a likely underlying arterial component; now s/p LLE angio via right groin approach; balloon L SFA with LDS    Plan:  -flat x 2 hours; may return to fl at that time if hemostasis maintained and right groin site stable  -right groin precautions; +mynx in place  -electrolyte replacement; mag 2g IVPB, K+ 40mEq powder x 2 doses (discussed poss. interactions with pharmacist; ok to give klor powder)  -may restart heparin gtt at 1500; no bolus dose; call Vascular PA for dose  -recheck BMP 5pm  -fentanyl 25mcg IVP x1 69 y/o obese F with PMHx HTN, dyslipidemia, Diastolic CHF, A.fib (on coumadin), chronic venous insufficiency, DM type 2, thyroid nodule, ulcerative colitis was admitted for worsening LLE erythema / pain. Patient has a history of severe chronic venous insufficiency with multiple previous admissions for bilateral leg ulcers and cellulitis; now s/p LLE via right groin approach (+mynx); L SFA Balloon with Dr. Carlson.    R groin pain+; all other ROS neg    Appearance: obese	  HEENT:   Normal oral mucosa, PERRL, EOMI	  Cardiovascular: Normal S1 S2, No JVD, No murmurs  Respiratory: Lungs clear to auscultation	  Psychiatry: A & O x 3, Mood & affect appropriate  Gastrointestinal:  Soft, Non-tender, + BS	  Neurologic: Non-focal  Extremities: Normal range of motion, No clubbing, cyanosis  Vascular: Palpable pulses absent distally; left ACE in place      ASSESSMENT: Chronic bilateral hyperpigmentation, left lower extremity stasis ulcerations with a likely underlying arterial component; now s/p LLE angio via right groin approach; balloon L SFA with LDS    Plan:  -flat x 2 hours; may return to fl at that time if hemostasis maintained and right groin site stable  -right groin precautions; +mynx in place  -electrolyte replacement; mag 2g IVPB, K+ 40mEq powder x 2 doses (discussed poss. interactions with pharmacist; ok to give klor powder)  -may restart heparin gtt at 1500; no bolus dose; call Vascular PA for dose  -recheck BMP 5pm  -fentanyl 25mcg IVP x1 69 y/o obese F with PMHx HTN, dyslipidemia, Diastolic CHF, A.fib (on coumadin), chronic venous insufficiency, DM type 2, thyroid nodule, ulcerative colitis was admitted for worsening LLE erythema / pain. Patient has a history of severe chronic venous insufficiency with multiple previous admissions for bilateral leg ulcers and cellulitis; now s/p LLE via right groin approach (+mynx); L SFA Balloon with Dr. Carlson.    Pt plavix loaded in LAB    R groin pain+; all other ROS neg    Appearance: obese	  HEENT:   Normal oral mucosa, PERRL, EOMI	  Cardiovascular: Normal S1 S2, No JVD, No murmurs  Respiratory: Lungs clear to auscultation	  Psychiatry: A & O x 3, Mood & affect appropriate  Gastrointestinal:  Soft, Non-tender, + BS	  Neurologic: Non-focal  Extremities: Normal range of motion, No clubbing, cyanosis  Vascular: Palpable pulses absent distally; left ACE in place      ASSESSMENT: Chronic bilateral hyperpigmentation, left lower extremity stasis ulcerations with a likely underlying arterial component; now s/p LLE angio via right groin approach; balloon L SFA with LDS    Plan:  -flat x 2 hours; may return to fl at that time if hemostasis maintained and right groin site stable  -right groin precautions; +mynx in place  -electrolyte replacement; mag 2g IVPB, K+ 40mEq powder x 2 doses (discussed poss. interactions with pharmacist; ok to give klor powder)  -may restart heparin gtt at 1500; no bolus dose; call Vascular PA for dose  -recheck BMP 5pm  -fentanyl 25mcg IVP x1

## 2019-05-17 NOTE — PROGRESS NOTE ADULT - SUBJECTIVE AND OBJECTIVE BOX
[facility]                                                       [facility]                                                        Office: [address] [address]                                                       Telephone: [phone]. Fax:[phone]                                                                             PROGRESS NOTE   Reason for follow up: pre-operative cardiovascular risk evaluation and management , diastolic heart failure, peripheral vascular disease                             Overnight: fever  Update: s/p left leg Wound debridement done on 5/16/2019   S/p Left leg angioplasty left SFA today.  Tolerated oprocedure well.     Subjective: "  i need more pain meds "   Complains of:  c/o pain. left leg pain .  Review of symptoms: Cardiac:  No chest pain. No dyspnea. No palpitations.  Respiratory: no cough. No dyspnea  Gastrointestinal: No diarrhea. No abdominal pain. No bleeding.     Past medical history: No updates.   Chronic conditions:  Hypertension: controlled. CHF: Stable.  	  Vitals:  Vital Signs Last 24 Hrs  Vital Signs Last 24 Hrs  T(C): 37.3 (05-17-19 @ 05:19), Max: 37.5 (05-16-19 @ 23:33)  T(F): 99.1 (05-17-19 @ 05:19), Max: 99.5 (05-16-19 @ 23:33)  HR: 103 (05-17-19 @ 12:00) (83 - 108)  BP: 124/53 (05-17-19 @ 12:00) (99/68 - 124/53)  BP(mean): --  RR: 16 (05-17-19 @ 12:00) (16 - 18)  SpO2: 97% (05-17-19 @ 12:00) (90% - 97%)    PHYSICAL EXAM:  Appearance: Comfortable. No acute distress  HEENT:  Head and neck: Atraumatic. Normocephalic.  Normal oral mucosa, PERRL, Neck is supple. No carotid bruit.   Neurologic: A & O x 3, no focal deficits. EOMI , Cranial nerves are intact.  Lymphatic: No cervical lymphadenopathy  Cardiovascular: Normal S1 S2, 3/6 systolic ejection murmur, rubs/gallops.   Respiratory: bilateral basal crepts.   Gastrointestinal:  Soft, Non-tender, + BS  Lower Extremities:  wrapped in ace wrap. wound not examined.   Psychiatry: Patient is calm. No agitation. Mood & affect appropriate  Skin: No rashes/ ecchymoses/cyanosis/ulcers visualized on the face,     CURRENT MEDICATIONS:     MEDICATIONS  (STANDING):  diltiazem    Tablet 120 milliGRAM(s) Oral daily  furosemide    Tablet 40 milliGRAM(s) Oral two times a day  metoprolol succinate  milliGRAM(s) Oral daily    montelukast  dicyclomine  collagenase Ointment  dextrose 5%.  dextrose 50% Injectable  dextrose 50% Injectable  dextrose 50% Injectable  gabapentin  heparin  Infusion  insulin lispro (HumaLOG) corrective regimen sliding scale  magnesium oxide  potassium chloride    Tablet ER  simvastatin    PRN: acetaminophen   Tablet .. PRN  dextrose 40% Gel PRN  glucagon  Injectable PRN  HYDROmorphone   Tablet PRN  HYDROmorphone   Tablet PRN  HYDROmorphone  Injectable PRN  ondansetron Injectable PRN                            9.8    7.6   )-----------( 373      ( 17 May 2019 03:02 )             30.9   N=x    ; L=x        17 May 2019 03:02    135    |  96     |  7.0    ----------------------------<  139    3.3     |  29.0   |  0.55     Ca    8.3        17 May 2019 03:02  Phos  3.8       17 May 2019 03:02  Mg     1.6       17 May 2019 03:02      Lipid Profile:   HgA1c: TSH:     TELEMETRY: Reviewed  no events.   ECG:  Reviewed by me. < from: 12 Lead ECG (05.13.19 @ 09:20) >  Atrial fibrillation with a   	    DIAGNOSTIC TESTING:  [ ] Echocardiogram: LVEF 48%. Severe LAE. moderate aortic stneosis.   [ ] Stress Test:  Stres echo. dobutamine No ischemia. Mild JOSE LUIS dysfunction on resting echo with inotropic contractile reserve.

## 2019-05-17 NOTE — PROGRESS NOTE ADULT - SUBJECTIVE AND OBJECTIVE BOX
68y Female s/p I&D left leg    T(C): 37.4 (05-17-19 @ 15:58), Max: 37.5 (05-16-19 @ 23:33)  HR: 104 (05-17-19 @ 15:58) (83 - 108)  BP: 132/77 (05-17-19 @ 15:58) (99/68 - 132/77)  RR: 16 (05-17-19 @ 15:58) (16 - 18)  SpO2: 94% (05-17-19 @ 15:58) (90% - 97%)  Wt(kg): --    Pt seen, doing well, no anesthesia complications or complaints noted or reported.   No Nausea  Pain well controlled

## 2019-05-17 NOTE — DIETITIAN INITIAL EVALUATION ADULT. - PERTINENT LABORATORY DATA
05-17 Na135 mmol/L Glu 139 mg/dL<H> K+ 3.3 mmol/L<L> Cr  0.55 mg/dL BUN 7.0 mg/dL<L> Phos 3.8 mg/dL Alb n/a   PAB n/a

## 2019-05-17 NOTE — PROGRESS NOTE ADULT - SUBJECTIVE AND OBJECTIVE BOX
69 y/o F with h/o obesity, HTN, dyslipidemia, Diastolic CHF, A.fib (on coumadin), chronic venous insufficiency, DM type 2, thyroid nodule, ulcerative colitis was admitted for worsening LLE erythema / pain. Patient has a history of severe chronic venous insufficiency with multiple previous admissions for bilateral leg ulcers and cellulitis. Presents today for LLE angiogram possibly angioplasty with Dr. Carlson.  Pt denies chest pain, SOB, palps.    c/o bilateral lower extremity pain RLE>LLE  ACE wrap in place to LLE     	  MEDICATIONS:  diltiazem    Tablet 120 milliGRAM(s) Oral daily  furosemide    Tablet 40 milliGRAM(s) Oral two times a day  metoprolol succinate  milliGRAM(s) Oral daily  clindamycin IVPB 900 milliGRAM(s) IV Intermittent every 8 hours  vancomycin  IVPB 1000 milliGRAM(s) IV Intermittent every 8 hours  montelukast 10 milliGRAM(s) Oral daily  acetaminophen   Tablet .. 650 milliGRAM(s) Oral every 6 hours PRN  gabapentin 300 milliGRAM(s) Oral three times a day  HYDROmorphone   Tablet 2 milliGRAM(s) Oral every 4 hours PRN  HYDROmorphone   Tablet 4 milliGRAM(s) Oral every 4 hours PRN  HYDROmorphone  Injectable 1 milliGRAM(s) IV Push every 3 hours PRN  ondansetron Injectable 4 milliGRAM(s) IV Push every 6 hours PRN  dicyclomine 20 milliGRAM(s) Oral before breakfast  dextrose 40% Gel 15 Gram(s) Oral once PRN  dextrose 50% Injectable 12.5 Gram(s) IV Push once  dextrose 50% Injectable 25 Gram(s) IV Push once  dextrose 50% Injectable 25 Gram(s) IV Push once  glucagon  Injectable 1 milliGRAM(s) IntraMuscular once PRN  insulin lispro (HumaLOG) corrective regimen sliding scale   SubCutaneous three times a day before meals  simvastatin 10 milliGRAM(s) Oral at bedtime  dextrose 5%. 1000 milliLiter(s) IV Continuous <Continuous>  heparin  Infusion.  Unit(s)/Hr IV Continuous <Continuous>  magnesium oxide 400 milliGRAM(s) Oral daily  sodium chloride 0.9%. 250 milliLiter(s) IV Continuous <Continuous>      PHYSICAL EXAM:  T(C): 37.3 (05-17-19 @ 05:19), Max: 37.8 (05-16-19 @ 07:59)  HR: 96 (05-17-19 @ 05:19) (83 - 96)  BP: 101/62 (05-17-19 @ 05:19) (99/68 - 132/69)  RR: 18 (05-17-19 @ 05:19) (12 - 18)  SpO2: 90% (05-17-19 @ 05:19) (90% - 99%)    Appearance: Normal	  HEENT:   Normal oral mucosa, PERRL, EOMI	  Cardiovascular: Normal S1 S2, No JVD, No murmurs, No edema  Respiratory: Lungs clear to auscultation	  Psychiatry: A & O x 3, Mood & affect appropriate  Gastrointestinal:  Soft, Non-tender, + BS	  Skin: No rashes, No ecchymoses, No cyanosis  Neurologic: Non-focal  Extremities: Normal range of motion, No clubbing, cyanosis or edema  Vascular:   	    LABS:	 	                        9.8    7.6   )-----------( 373      ( 17 May 2019 03:02 )             30.9     05-17    135  |  96<L>  |  7.0<L>  ----------------------------<  139<H>  3.3<L>   |  29.0  |  0.55    Ca    8.3<L>      17 May 2019 03:02  Phos  3.8     05-17  Mg     1.6     05-17      ASSESSMENT: Chronic bilateral hyperpigmentation, left lower extremity stasis ulcerations with a likely underlying arterial component   -consent to be obtained  -procedure discussed with patient; risks and benefits explained, questions answered  -labs; vascular progress notes reviewed  -discussed electrolyte replacement for post procedure  -asa 325mg po pre angio 67 y/o F with h/o obesity, HTN, dyslipidemia, Diastolic CHF, A.fib (on coumadin), chronic venous insufficiency, DM type 2, thyroid nodule, ulcerative colitis was admitted for worsening LLE erythema / pain. Patient has a history of severe chronic venous insufficiency with multiple previous admissions for bilateral leg ulcers and cellulitis. Presents today for LLE angiogram possibly angioplasty with Dr. Carlson.  Pt denies chest pain, SOB, palps.    c/o bilateral lower extremity pain RLE>LLE  ACE wrap in place to LLE     	  MEDICATIONS:  diltiazem    Tablet 120 milliGRAM(s) Oral daily  furosemide    Tablet 40 milliGRAM(s) Oral two times a day  metoprolol succinate  milliGRAM(s) Oral daily  clindamycin IVPB 900 milliGRAM(s) IV Intermittent every 8 hours  vancomycin  IVPB 1000 milliGRAM(s) IV Intermittent every 8 hours  montelukast 10 milliGRAM(s) Oral daily  acetaminophen   Tablet .. 650 milliGRAM(s) Oral every 6 hours PRN  gabapentin 300 milliGRAM(s) Oral three times a day  HYDROmorphone   Tablet 2 milliGRAM(s) Oral every 4 hours PRN  HYDROmorphone   Tablet 4 milliGRAM(s) Oral every 4 hours PRN  HYDROmorphone  Injectable 1 milliGRAM(s) IV Push every 3 hours PRN  ondansetron Injectable 4 milliGRAM(s) IV Push every 6 hours PRN  dicyclomine 20 milliGRAM(s) Oral before breakfast  dextrose 40% Gel 15 Gram(s) Oral once PRN  dextrose 50% Injectable 12.5 Gram(s) IV Push once  dextrose 50% Injectable 25 Gram(s) IV Push once  dextrose 50% Injectable 25 Gram(s) IV Push once  glucagon  Injectable 1 milliGRAM(s) IntraMuscular once PRN  insulin lispro (HumaLOG) corrective regimen sliding scale   SubCutaneous three times a day before meals  simvastatin 10 milliGRAM(s) Oral at bedtime  dextrose 5%. 1000 milliLiter(s) IV Continuous <Continuous>  heparin  Infusion.  Unit(s)/Hr IV Continuous <Continuous>  magnesium oxide 400 milliGRAM(s) Oral daily  sodium chloride 0.9%. 250 milliLiter(s) IV Continuous <Continuous>      PHYSICAL EXAM:  T(C): 37.3 (05-17-19 @ 05:19), Max: 37.8 (05-16-19 @ 07:59)  HR: 96 (05-17-19 @ 05:19) (83 - 96)  BP: 101/62 (05-17-19 @ 05:19) (99/68 - 132/69)  RR: 18 (05-17-19 @ 05:19) (12 - 18)  SpO2: 90% (05-17-19 @ 05:19) (90% - 99%)    Appearance: Normal	  HEENT:   Normal oral mucosa, PERRL, EOMI	  Cardiovascular: Normal S1 S2, No JVD, No murmurs, No edema  Respiratory: Lungs clear to auscultation	  Psychiatry: A & O x 3, Mood & affect appropriate  Gastrointestinal:  Soft, Non-tender, + BS	  Skin: No rashes, No ecchymoses, No cyanosis  Neurologic: Non-focal  Extremities: Normal range of motion, No clubbing, cyanosis or edema  Vascular: Palpable pulses absent distally; left ACE in place  	    LABS:	 	                        9.8    7.6   )-----------( 373      ( 17 May 2019 03:02 )             30.9     05-17    135  |  96<L>  |  7.0<L>  ----------------------------<  139<H>  3.3<L>   |  29.0  |  0.55    Ca    8.3<L>      17 May 2019 03:02  Phos  3.8     05-17  Mg     1.6     05-17      ASSESSMENT: Chronic bilateral hyperpigmentation, left lower extremity stasis ulcerations with a likely underlying arterial component   -consent to be obtained  -procedure discussed with patient; risks and benefits explained, questions answered  -labs; vascular progress notes reviewed  -discussed electrolyte replacement for post procedure  -asa 325mg po pre angio 69 y/o F with h/o obesity, HTN, dyslipidemia, Diastolic CHF, A.fib (on coumadin), chronic venous insufficiency, DM type 2, thyroid nodule, ulcerative colitis was admitted for worsening LLE erythema / pain. Patient has a history of severe chronic venous insufficiency with multiple previous admissions for bilateral leg ulcers and cellulitis. Presents today for LLE angiogram possibly angioplasty with Dr. Carlson.  Pt denies chest pain, SOB, palps.    c/o bilateral lower extremity pain RLE>LLE  ACE wrap in place to LLE     	  MEDICATIONS:  diltiazem    Tablet 120 milliGRAM(s) Oral daily  furosemide    Tablet 40 milliGRAM(s) Oral two times a day  metoprolol succinate  milliGRAM(s) Oral daily  clindamycin IVPB 900 milliGRAM(s) IV Intermittent every 8 hours  vancomycin  IVPB 1000 milliGRAM(s) IV Intermittent every 8 hours  montelukast 10 milliGRAM(s) Oral daily  acetaminophen   Tablet .. 650 milliGRAM(s) Oral every 6 hours PRN  gabapentin 300 milliGRAM(s) Oral three times a day  HYDROmorphone   Tablet 2 milliGRAM(s) Oral every 4 hours PRN  HYDROmorphone   Tablet 4 milliGRAM(s) Oral every 4 hours PRN  HYDROmorphone  Injectable 1 milliGRAM(s) IV Push every 3 hours PRN  ondansetron Injectable 4 milliGRAM(s) IV Push every 6 hours PRN  dicyclomine 20 milliGRAM(s) Oral before breakfast  dextrose 40% Gel 15 Gram(s) Oral once PRN  dextrose 50% Injectable 12.5 Gram(s) IV Push once  dextrose 50% Injectable 25 Gram(s) IV Push once  dextrose 50% Injectable 25 Gram(s) IV Push once  glucagon  Injectable 1 milliGRAM(s) IntraMuscular once PRN  insulin lispro (HumaLOG) corrective regimen sliding scale   SubCutaneous three times a day before meals  simvastatin 10 milliGRAM(s) Oral at bedtime  dextrose 5%. 1000 milliLiter(s) IV Continuous <Continuous>  heparin  Infusion.  Unit(s)/Hr IV Continuous <Continuous>  magnesium oxide 400 milliGRAM(s) Oral daily  sodium chloride 0.9%. 250 milliLiter(s) IV Continuous <Continuous>      PHYSICAL EXAM:  T(C): 37.3 (05-17-19 @ 05:19), Max: 37.8 (05-16-19 @ 07:59)  HR: 96 (05-17-19 @ 05:19) (83 - 96)  BP: 101/62 (05-17-19 @ 05:19) (99/68 - 132/69)  RR: 18 (05-17-19 @ 05:19) (12 - 18)  SpO2: 90% (05-17-19 @ 05:19) (90% - 99%)    Appearance: Normal	  HEENT:   Normal oral mucosa, PERRL, EOMI	  Cardiovascular: Normal S1 S2, No JVD, No murmurs  Respiratory: Lungs clear to auscultation	  Psychiatry: A & O x 3, Mood & affect appropriate  Gastrointestinal:  Soft, Non-tender, + BS	  Skin: No rashes, No ecchymoses, No cyanosis  Neurologic: Non-focal  Extremities: Normal range of motion, No clubbing, cyanosis   Vascular: Palpable pulses absent distally; left ACE in place  	    LABS:	 	                        9.8    7.6   )-----------( 373      ( 17 May 2019 03:02 )             30.9     05-17    135  |  96<L>  |  7.0<L>  ----------------------------<  139<H>  3.3<L>   |  29.0  |  0.55    Ca    8.3<L>      17 May 2019 03:02  Phos  3.8     05-17  Mg     1.6     05-17      ASSESSMENT: Chronic bilateral hyperpigmentation, left lower extremity stasis ulcerations with a likely underlying arterial component   -consent to be obtained  -procedure discussed with patient; risks and benefits explained, questions answered  -labs; vascular progress notes reviewed  -discussed electrolyte replacement for post procedure  -asa 325mg po pre angio

## 2019-05-17 NOTE — PHYSICAL EXAM
[1+] : right 1+ [2+] : left 2+ [Ankle Swelling Bilaterally] : bilaterally  [Ankle Swelling (On Exam)] : present [Alert] : alert [] : bilaterally [Ankle Swelling On The Left] : moderate [Oriented to Place] : oriented to place [Oriented to Person] : oriented to person [de-identified] : WD, WN, NAD. Awake, alert, interactive. Ambulates slowly with a cane for support. [Oriented to Time] : oriented to time [Calm] : calm [de-identified] : LETICIA, PERSHASHAL [de-identified] : supple [de-identified] : right leg healed. Left leg with severe large areas of shallow ulcerations with necrosis.

## 2019-05-17 NOTE — DIETITIAN INITIAL EVALUATION ADULT. - OTHER INFO
Pt admitted with b/l leg ulcers s/p wound debridement (5/16), hx of CHF (EF 55-60%), T2DM (HgbA1c 6.6%), HTN, HLD, ulcerative colitis. Pt seen sleeping, not easily awoken to voice. Written literature for weight management and Low Na heart health left at bedside. RD to follow up with verbal education as time allows. RD to remain available.

## 2019-05-17 NOTE — PROGRESS NOTE ADULT - SUBJECTIVE AND OBJECTIVE BOX
69yo F s/p LLE AT angioplasty. Patient tolerated well the procedure and has no active complaints except for pain in her calf wounds. Tolerating diet and voiding. Denies fever, chills, nausea, vomiting, diarrhea, constipation, chest pain, and sob.     Vital Signs Last 24 Hrs  T(C): 37.3 (17 May 2019 05:19), Max: 37.5 (16 May 2019 23:33)  T(F): 99.1 (17 May 2019 05:19), Max: 99.5 (16 May 2019 23:33)  HR: 103 (17 May 2019 12:00) (83 - 108)  BP: 124/53 (17 May 2019 12:00) (99/68 - 124/53)  BP(mean): --  RR: 16 (17 May 2019 12:00) (16 - 18)  SpO2: 97% (17 May 2019 12:00) (90% - 97%)    I&O's Detail    17 May 2019 07:01  -  17 May 2019 14:51  --------------------------------------------------------  IN:  Total IN: 0 mL    OUT:    Voided: 350 mL  Total OUT: 350 mL    Total NET: -350 mL    Constitutional: AAOx3  HEENT: EOMI / PERRLA, MMM  Respiratory: non labored breathing  Cardiovascular: RRR  Gastrointestinal: Abdomen soft, non-tender, non-distended, no rebound tenderness / guarding  Msk: RLE w venous stasis skin changes that are improving, swelling is decreasing. LLE chronic wounds in calf and lateral aspect of shin which have remained unchanged.   Vas: b/l LE non palpable pulses. Doppler signals b/l DP and RLE PT.    LABS:                        9.8    7.6   )-----------( 373      ( 17 May 2019 03:02 )             30.9     05-17    135  |  96<L>  |  7.0<L>  ----------------------------<  139<H>  3.3<L>   |  29.0  |  0.55    Ca    8.3<L>      17 May 2019 03:02  Phos  3.8     05-17  Mg     1.6     05-17      PT/INR - ( 16 May 2019 03:45 )   PT: 19.7 sec;   INR: 1.69 ratio         PTT - ( 17 May 2019 03:02 )  PTT:57.4 sec      MEDICATIONS  (STANDING):  collagenase Ointment 1 Application(s) Topical daily  dextrose 5%. 1000 milliLiter(s) (50 mL/Hr) IV Continuous <Continuous>  dextrose 50% Injectable 12.5 Gram(s) IV Push once  dextrose 50% Injectable 25 Gram(s) IV Push once  dextrose 50% Injectable 25 Gram(s) IV Push once  dicyclomine 20 milliGRAM(s) Oral before breakfast  diltiazem    Tablet 120 milliGRAM(s) Oral daily  furosemide    Tablet 40 milliGRAM(s) Oral two times a day  gabapentin 300 milliGRAM(s) Oral three times a day  heparin  Infusion 2500 Unit(s)/Hr (25 mL/Hr) IV Continuous <Continuous>  insulin lispro (HumaLOG) corrective regimen sliding scale   SubCutaneous three times a day before meals  magnesium oxide 400 milliGRAM(s) Oral daily  metoprolol succinate  milliGRAM(s) Oral daily  montelukast 10 milliGRAM(s) Oral daily  potassium chloride    Tablet ER 20 milliEquivalent(s) Oral every 2 hours  simvastatin 10 milliGRAM(s) Oral at bedtime    MEDICATIONS  (PRN):  acetaminophen   Tablet .. 650 milliGRAM(s) Oral every 6 hours PRN Temp greater or equal to 38C (100.4F), Mild Pain (1 - 3)  dextrose 40% Gel 15 Gram(s) Oral once PRN Blood Glucose LESS THAN 70 milliGRAM(s)/deciliter  glucagon  Injectable 1 milliGRAM(s) IntraMuscular once PRN Glucose LESS THAN 70 milligrams/deciliter  HYDROmorphone   Tablet 2 milliGRAM(s) Oral every 4 hours PRN Moderate Pain (4 - 6)  HYDROmorphone   Tablet 4 milliGRAM(s) Oral every 4 hours PRN Severe Pain (7 - 10)  HYDROmorphone  Injectable 1 milliGRAM(s) IV Push every 3 hours PRN Severe Pain (7 - 10), breakthru  ondansetron Injectable 4 milliGRAM(s) IV Push every 6 hours PRN Nausea and/or Vomiting

## 2019-05-17 NOTE — ASSESSMENT
[FreeTextEntry1] : 69 y/o woman with lymphedema and stasis ulceration, with 2 wounds that are new in the dorsum of foot in addition to posterior calf. Overall today continues to improve. Cleansed wounds with soap and water and redressed with Xeroform and ace bandage. Granulating nicely and significantly smaller in size.\par \par Today the lower leg seems improving, will continue Santyl. Went to a cruise so legs a bit more swollen today, no cellulitis. Left leg has severe ulceration and cellulitis. Continue care as needed and follow weekly. Will continue home care, antibiotics and pain control. If not improved will admit to hospital for debridement and possible angiogram. [Arterial/Venous Disease] : arterial/venous disease

## 2019-05-17 NOTE — HISTORY OF PRESENT ILLNESS
[FreeTextEntry1] : The patient is a 67 y/o female with pain and swelling to BLE for 3 months. There is an ulcer to the back of her right calf. There is no weeping of drainage of wound or legs. She has been using Santyl and Silvadene for wound healing. She was previously seen by Dr Rivera who debrided the wound and applied unna boots with limited improvement. Comorbidities include DM2, HTN and A Fib. She takes ASA 81 mg, Coumadin and Pravastatin 10 mg QD. Was recently admitted to  with the left leg having ulcerations and fat necrosis. Undergoing wound care at home. Comes with severe leg pain.

## 2019-05-18 LAB
ANION GAP SERPL CALC-SCNC: 13 MMOL/L — SIGNIFICANT CHANGE UP (ref 5–17)
APTT BLD: 34.7 SEC — SIGNIFICANT CHANGE UP (ref 27.5–36.3)
APTT BLD: 57.7 SEC — HIGH (ref 27.5–36.3)
BASOPHILS # BLD AUTO: 0 K/UL — SIGNIFICANT CHANGE UP (ref 0–0.2)
BASOPHILS NFR BLD AUTO: 0.6 % — SIGNIFICANT CHANGE UP (ref 0–2)
BUN SERPL-MCNC: 5 MG/DL — LOW (ref 8–20)
CALCIUM SERPL-MCNC: 8.7 MG/DL — SIGNIFICANT CHANGE UP (ref 8.6–10.2)
CHLORIDE SERPL-SCNC: 92 MMOL/L — LOW (ref 98–107)
CO2 SERPL-SCNC: 30 MMOL/L — HIGH (ref 22–29)
CREAT SERPL-MCNC: 0.44 MG/DL — LOW (ref 0.5–1.3)
EOSINOPHIL # BLD AUTO: 0.2 K/UL — SIGNIFICANT CHANGE UP (ref 0–0.5)
EOSINOPHIL NFR BLD AUTO: 2.6 % — SIGNIFICANT CHANGE UP (ref 0–6)
GLUCOSE SERPL-MCNC: 170 MG/DL — HIGH (ref 70–115)
HCT VFR BLD CALC: 33.4 % — LOW (ref 37–47)
HGB BLD-MCNC: 10.4 G/DL — LOW (ref 12–16)
LYMPHOCYTES # BLD AUTO: 1 K/UL — SIGNIFICANT CHANGE UP (ref 1–4.8)
LYMPHOCYTES # BLD AUTO: 11.2 % — LOW (ref 20–55)
MAGNESIUM SERPL-MCNC: 1.7 MG/DL — SIGNIFICANT CHANGE UP (ref 1.6–2.6)
MCHC RBC-ENTMCNC: 29.4 PG — SIGNIFICANT CHANGE UP (ref 27–31)
MCHC RBC-ENTMCNC: 31.1 G/DL — LOW (ref 32–36)
MCV RBC AUTO: 94.4 FL — SIGNIFICANT CHANGE UP (ref 81–99)
MONOCYTES # BLD AUTO: 0.7 K/UL — SIGNIFICANT CHANGE UP (ref 0–0.8)
MONOCYTES NFR BLD AUTO: 8.3 % — SIGNIFICANT CHANGE UP (ref 3–10)
NEUTROPHILS # BLD AUTO: 6.5 K/UL — SIGNIFICANT CHANGE UP (ref 1.8–8)
NEUTROPHILS NFR BLD AUTO: 76.8 % — HIGH (ref 37–73)
PHOSPHATE SERPL-MCNC: 3.7 MG/DL — SIGNIFICANT CHANGE UP (ref 2.4–4.7)
PLATELET # BLD AUTO: 381 K/UL — SIGNIFICANT CHANGE UP (ref 150–400)
POTASSIUM SERPL-MCNC: 3.5 MMOL/L — SIGNIFICANT CHANGE UP (ref 3.5–5.3)
POTASSIUM SERPL-SCNC: 3.5 MMOL/L — SIGNIFICANT CHANGE UP (ref 3.5–5.3)
RBC # BLD: 3.54 M/UL — LOW (ref 4.4–5.2)
RBC # FLD: 14.7 % — SIGNIFICANT CHANGE UP (ref 11–15.6)
SODIUM SERPL-SCNC: 135 MMOL/L — SIGNIFICANT CHANGE UP (ref 135–145)
WBC # BLD: 8.5 K/UL — SIGNIFICANT CHANGE UP (ref 4.8–10.8)
WBC # FLD AUTO: 8.5 K/UL — SIGNIFICANT CHANGE UP (ref 4.8–10.8)

## 2019-05-18 RX ORDER — POTASSIUM CHLORIDE 20 MEQ
20 PACKET (EA) ORAL ONCE
Refills: 0 | Status: COMPLETED | OUTPATIENT
Start: 2019-05-18 | End: 2019-05-18

## 2019-05-18 RX ORDER — SODIUM,POTASSIUM PHOSPHATES 278-250MG
1 POWDER IN PACKET (EA) ORAL ONCE
Refills: 0 | Status: COMPLETED | OUTPATIENT
Start: 2019-05-18 | End: 2019-05-18

## 2019-05-18 RX ORDER — MAGNESIUM OXIDE 400 MG ORAL TABLET 241.3 MG
400 TABLET ORAL ONCE
Refills: 0 | Status: COMPLETED | OUTPATIENT
Start: 2019-05-18 | End: 2019-05-18

## 2019-05-18 RX ORDER — APIXABAN 2.5 MG/1
5 TABLET, FILM COATED ORAL EVERY 12 HOURS
Refills: 0 | Status: DISCONTINUED | OUTPATIENT
Start: 2019-05-18 | End: 2019-05-20

## 2019-05-18 RX ORDER — APIXABAN 2.5 MG/1
2.5 TABLET, FILM COATED ORAL EVERY 12 HOURS
Refills: 0 | Status: DISCONTINUED | OUTPATIENT
Start: 2019-05-18 | End: 2019-05-18

## 2019-05-18 RX ADMIN — SIMVASTATIN 10 MILLIGRAM(S): 20 TABLET, FILM COATED ORAL at 21:15

## 2019-05-18 RX ADMIN — GABAPENTIN 300 MILLIGRAM(S): 400 CAPSULE ORAL at 12:30

## 2019-05-18 RX ADMIN — MAGNESIUM OXIDE 400 MG ORAL TABLET 400 MILLIGRAM(S): 241.3 TABLET ORAL at 17:39

## 2019-05-18 RX ADMIN — APIXABAN 5 MILLIGRAM(S): 2.5 TABLET, FILM COATED ORAL at 21:15

## 2019-05-18 RX ADMIN — APIXABAN 5 MILLIGRAM(S): 2.5 TABLET, FILM COATED ORAL at 12:31

## 2019-05-18 RX ADMIN — Medication 1: at 12:52

## 2019-05-18 RX ADMIN — HYDROMORPHONE HYDROCHLORIDE 4 MILLIGRAM(S): 2 INJECTION INTRAMUSCULAR; INTRAVENOUS; SUBCUTANEOUS at 01:23

## 2019-05-18 RX ADMIN — Medication 40 MILLIGRAM(S): at 17:39

## 2019-05-18 RX ADMIN — Medication 120 MILLIGRAM(S): at 05:50

## 2019-05-18 RX ADMIN — HYDROMORPHONE HYDROCHLORIDE 4 MILLIGRAM(S): 2 INJECTION INTRAMUSCULAR; INTRAVENOUS; SUBCUTANEOUS at 07:04

## 2019-05-18 RX ADMIN — Medication 20 MILLIEQUIVALENT(S): at 15:05

## 2019-05-18 RX ADMIN — Medication 40 MILLIGRAM(S): at 05:01

## 2019-05-18 RX ADMIN — Medication 650 MILLIGRAM(S): at 21:15

## 2019-05-18 RX ADMIN — GABAPENTIN 300 MILLIGRAM(S): 400 CAPSULE ORAL at 05:00

## 2019-05-18 RX ADMIN — MONTELUKAST 10 MILLIGRAM(S): 4 TABLET, CHEWABLE ORAL at 15:05

## 2019-05-18 RX ADMIN — HYDROMORPHONE HYDROCHLORIDE 4 MILLIGRAM(S): 2 INJECTION INTRAMUSCULAR; INTRAVENOUS; SUBCUTANEOUS at 15:17

## 2019-05-18 RX ADMIN — HYDROMORPHONE HYDROCHLORIDE 4 MILLIGRAM(S): 2 INJECTION INTRAMUSCULAR; INTRAVENOUS; SUBCUTANEOUS at 16:15

## 2019-05-18 RX ADMIN — HYDROMORPHONE HYDROCHLORIDE 4 MILLIGRAM(S): 2 INJECTION INTRAMUSCULAR; INTRAVENOUS; SUBCUTANEOUS at 21:19

## 2019-05-18 RX ADMIN — Medication 20 MILLIGRAM(S): at 05:00

## 2019-05-18 RX ADMIN — GABAPENTIN 300 MILLIGRAM(S): 400 CAPSULE ORAL at 21:15

## 2019-05-18 RX ADMIN — HYDROMORPHONE HYDROCHLORIDE 4 MILLIGRAM(S): 2 INJECTION INTRAMUSCULAR; INTRAVENOUS; SUBCUTANEOUS at 08:00

## 2019-05-18 RX ADMIN — Medication 1 PACKET(S): at 15:05

## 2019-05-18 RX ADMIN — Medication 100 MILLIGRAM(S): at 05:00

## 2019-05-18 RX ADMIN — HYDROMORPHONE HYDROCHLORIDE 4 MILLIGRAM(S): 2 INJECTION INTRAMUSCULAR; INTRAVENOUS; SUBCUTANEOUS at 02:23

## 2019-05-18 RX ADMIN — ONDANSETRON 4 MILLIGRAM(S): 8 TABLET, FILM COATED ORAL at 08:34

## 2019-05-18 RX ADMIN — MAGNESIUM OXIDE 400 MG ORAL TABLET 400 MILLIGRAM(S): 241.3 TABLET ORAL at 12:30

## 2019-05-18 RX ADMIN — HEPARIN SODIUM 25 UNIT(S)/HR: 5000 INJECTION INTRAVENOUS; SUBCUTANEOUS at 00:31

## 2019-05-18 RX ADMIN — HYDROMORPHONE HYDROCHLORIDE 4 MILLIGRAM(S): 2 INJECTION INTRAMUSCULAR; INTRAVENOUS; SUBCUTANEOUS at 20:19

## 2019-05-18 RX ADMIN — Medication 1: at 08:30

## 2019-05-18 NOTE — PROGRESS NOTE ADULT - SUBJECTIVE AND OBJECTIVE BOX
· Subjective and Objective:   67 y/o obese F with PMHx HTN, dyslipidemia, Diastolic CHF, A.fib (on coumadin), chronic venous insufficiency, DM type 2, thyroid nodule, ulcerative colitis was admitted for worsening LLE erythema / pain. Patient has a history of severe chronic venous insufficiency with multiple previous admissions for bilateral leg ulcers and cellulitis; now s/p LLE via right groin approach (+mynx); L SFA Balloon with Dr. Carlson.    Pt plavix loaded in LAB yesterday    R groin pain+; all other ROS neg  Dressing to R groin removed, site soft without s/s bleeding or hematoma    Appearance: obese	  HEENT:   Normal oral mucosa, PERRL, EOMI	  Cardiovascular: Normal S1 S2, No JVD, No murmurs  Respiratory: Lungs clear to auscultation	  Psychiatry: A & O x 3, Mood & affect appropriate  Gastrointestinal:  Soft, Non-tender, + BS	  Neurologic: Non-focal  Extremities: Normal range of motion, No clubbing, cyanosis  Vascular: Palpable pulses absent distally; left LE ACE in place      ASSESSMENT: Chronic bilateral hyperpigmentation, left lower extremity stasis ulcerations with a likely underlying arterial component; now s/p LLE angio via right groin approach; balloon L SFA with LDS    Plan:  -right groin precautions; +mynx in place, no s/s bleeding or hematoma  -Continue POC as per vascular (primary team)

## 2019-05-18 NOTE — PROGRESS NOTE ADULT - SUBJECTIVE AND OBJECTIVE BOX
INTERVAL HPI/OVERNIGHT EVENTS:    SUBJECTIVE:   POD1 s/p LLE AT angioplasty. Patient tolerated well the procedure and has no active complaints except for pain in her calf wounds. Tolerating diet and voiding. Denies fever, chills, nausea, vomiting, diarrhea, constipation, chest pain, and sob.       MEDICATIONS  (STANDING):  collagenase Ointment 1 Application(s) Topical daily  dextrose 5%. 1000 milliLiter(s) (50 mL/Hr) IV Continuous <Continuous>  dextrose 50% Injectable 12.5 Gram(s) IV Push once  dextrose 50% Injectable 25 Gram(s) IV Push once  dextrose 50% Injectable 25 Gram(s) IV Push once  dicyclomine 20 milliGRAM(s) Oral before breakfast  diltiazem    Tablet 120 milliGRAM(s) Oral daily  furosemide    Tablet 40 milliGRAM(s) Oral two times a day  gabapentin 300 milliGRAM(s) Oral three times a day  heparin  Infusion 2500 Unit(s)/Hr (25 mL/Hr) IV Continuous <Continuous>  insulin lispro (HumaLOG) corrective regimen sliding scale   SubCutaneous three times a day before meals  magnesium oxide 400 milliGRAM(s) Oral daily  metoprolol succinate  milliGRAM(s) Oral daily  montelukast 10 milliGRAM(s) Oral daily  simvastatin 10 milliGRAM(s) Oral at bedtime    MEDICATIONS  (PRN):  acetaminophen   Tablet .. 650 milliGRAM(s) Oral every 6 hours PRN Temp greater or equal to 38C (100.4F), Mild Pain (1 - 3)  dextrose 40% Gel 15 Gram(s) Oral once PRN Blood Glucose LESS THAN 70 milliGRAM(s)/deciliter  glucagon  Injectable 1 milliGRAM(s) IntraMuscular once PRN Glucose LESS THAN 70 milligrams/deciliter  HYDROmorphone   Tablet 2 milliGRAM(s) Oral every 4 hours PRN Moderate Pain (4 - 6)  HYDROmorphone   Tablet 4 milliGRAM(s) Oral every 4 hours PRN Severe Pain (7 - 10)  HYDROmorphone  Injectable 1 milliGRAM(s) IV Push every 3 hours PRN Severe Pain (7 - 10), breakthru  ondansetron Injectable 4 milliGRAM(s) IV Push every 6 hours PRN Nausea and/or Vomiting      Vital Signs Last 24 Hrs  T(C): 37.4 (18 May 2019 01:29), Max: 38.5 (17 May 2019 23:26)  T(F): 99.4 (18 May 2019 01:29), Max: 101.3 (17 May 2019 23:26)  HR: 118 (17 May 2019 23:26) (96 - 118)  BP: 109/77 (17 May 2019 23:26) (101/62 - 132/77)  BP(mean): --  RR: 16 (17 May 2019 23:26) (16 - 18)  SpO2: 94% (17 May 2019 23:26) (90% - 97%)    PE  Constitutional: AAOx3  HEENT: EOMI / PERRLA, MMM  Respiratory: non labored breathing  Cardiovascular: RRR  Gastrointestinal: Abdomen soft, non-tender, non-distended, no rebound tenderness / guarding  Msk: RLE w venous stasis skin changes that are improving, swelling is decreasing. LLE chronic wounds in calf and lateral aspect of shin which have remained unchanged.   Vas: b/l LE non palpable pulses. Doppler signals b/l DP and RLE PT.      I&O's Detail    17 May 2019 07:01  -  18 May 2019 03:28  --------------------------------------------------------  IN:    heparin Infusion: 75 mL  Total IN: 75 mL    OUT:    Voided: 350 mL  Total OUT: 350 mL    Total NET: -275 mL          LABS:                        10.0   7.7   )-----------( 381      ( 17 May 2019 16:34 )             32.4     05-17    137  |  95<L>  |  5.0<L>  ----------------------------<  134<H>  3.8   |  28.0  |  0.54    Ca    8.3<L>      17 May 2019 16:34  Phos  3.2     05-17  Mg     1.8     05-17      PT/INR - ( 16 May 2019 03:45 )   PT: 19.7 sec;   INR: 1.69 ratio         PTT - ( 17 May 2019 23:45 )  PTT:57.7 sec      RADIOLOGY & ADDITIONAL STUDIES:

## 2019-05-19 LAB
ANION GAP SERPL CALC-SCNC: 13 MMOL/L — SIGNIFICANT CHANGE UP (ref 5–17)
BASOPHILS # BLD AUTO: 0 K/UL — SIGNIFICANT CHANGE UP (ref 0–0.2)
BASOPHILS NFR BLD AUTO: 0.3 % — SIGNIFICANT CHANGE UP (ref 0–2)
BUN SERPL-MCNC: 6 MG/DL — LOW (ref 8–20)
CALCIUM SERPL-MCNC: 9.2 MG/DL — SIGNIFICANT CHANGE UP (ref 8.6–10.2)
CHLORIDE SERPL-SCNC: 93 MMOL/L — LOW (ref 98–107)
CO2 SERPL-SCNC: 29 MMOL/L — SIGNIFICANT CHANGE UP (ref 22–29)
CREAT SERPL-MCNC: 0.56 MG/DL — SIGNIFICANT CHANGE UP (ref 0.5–1.3)
EOSINOPHIL # BLD AUTO: 0.2 K/UL — SIGNIFICANT CHANGE UP (ref 0–0.5)
EOSINOPHIL NFR BLD AUTO: 2.5 % — SIGNIFICANT CHANGE UP (ref 0–6)
GLUCOSE SERPL-MCNC: 157 MG/DL — HIGH (ref 70–115)
HCT VFR BLD CALC: 34.7 % — LOW (ref 37–47)
HGB BLD-MCNC: 10.8 G/DL — LOW (ref 12–16)
LYMPHOCYTES # BLD AUTO: 1.3 K/UL — SIGNIFICANT CHANGE UP (ref 1–4.8)
LYMPHOCYTES # BLD AUTO: 13.2 % — LOW (ref 20–55)
MAGNESIUM SERPL-MCNC: 1.7 MG/DL — SIGNIFICANT CHANGE UP (ref 1.6–2.6)
MCHC RBC-ENTMCNC: 29 PG — SIGNIFICANT CHANGE UP (ref 27–31)
MCHC RBC-ENTMCNC: 31.1 G/DL — LOW (ref 32–36)
MCV RBC AUTO: 93.3 FL — SIGNIFICANT CHANGE UP (ref 81–99)
MONOCYTES # BLD AUTO: 0.8 K/UL — SIGNIFICANT CHANGE UP (ref 0–0.8)
MONOCYTES NFR BLD AUTO: 7.7 % — SIGNIFICANT CHANGE UP (ref 3–10)
NEUTROPHILS # BLD AUTO: 7.4 K/UL — SIGNIFICANT CHANGE UP (ref 1.8–8)
NEUTROPHILS NFR BLD AUTO: 75.7 % — HIGH (ref 37–73)
PHOSPHATE SERPL-MCNC: 4.3 MG/DL — SIGNIFICANT CHANGE UP (ref 2.4–4.7)
PLATELET # BLD AUTO: 401 K/UL — HIGH (ref 150–400)
POTASSIUM SERPL-MCNC: 3.7 MMOL/L — SIGNIFICANT CHANGE UP (ref 3.5–5.3)
POTASSIUM SERPL-SCNC: 3.7 MMOL/L — SIGNIFICANT CHANGE UP (ref 3.5–5.3)
RBC # BLD: 3.72 M/UL — LOW (ref 4.4–5.2)
RBC # FLD: 14.9 % — SIGNIFICANT CHANGE UP (ref 11–15.6)
SODIUM SERPL-SCNC: 135 MMOL/L — SIGNIFICANT CHANGE UP (ref 135–145)
WBC # BLD: 9.8 K/UL — SIGNIFICANT CHANGE UP (ref 4.8–10.8)
WBC # FLD AUTO: 9.8 K/UL — SIGNIFICANT CHANGE UP (ref 4.8–10.8)

## 2019-05-19 RX ORDER — HYDROMORPHONE HYDROCHLORIDE 2 MG/ML
2 INJECTION INTRAMUSCULAR; INTRAVENOUS; SUBCUTANEOUS ONCE
Refills: 0 | Status: DISCONTINUED | OUTPATIENT
Start: 2019-05-19 | End: 2019-05-19

## 2019-05-19 RX ADMIN — HYDROMORPHONE HYDROCHLORIDE 1 MILLIGRAM(S): 2 INJECTION INTRAMUSCULAR; INTRAVENOUS; SUBCUTANEOUS at 01:40

## 2019-05-19 RX ADMIN — Medication 120 MILLIGRAM(S): at 05:09

## 2019-05-19 RX ADMIN — HYDROMORPHONE HYDROCHLORIDE 1 MILLIGRAM(S): 2 INJECTION INTRAMUSCULAR; INTRAVENOUS; SUBCUTANEOUS at 13:54

## 2019-05-19 RX ADMIN — HYDROMORPHONE HYDROCHLORIDE 4 MILLIGRAM(S): 2 INJECTION INTRAMUSCULAR; INTRAVENOUS; SUBCUTANEOUS at 06:19

## 2019-05-19 RX ADMIN — MAGNESIUM OXIDE 400 MG ORAL TABLET 400 MILLIGRAM(S): 241.3 TABLET ORAL at 11:50

## 2019-05-19 RX ADMIN — HYDROMORPHONE HYDROCHLORIDE 4 MILLIGRAM(S): 2 INJECTION INTRAMUSCULAR; INTRAVENOUS; SUBCUTANEOUS at 10:05

## 2019-05-19 RX ADMIN — Medication 650 MILLIGRAM(S): at 23:54

## 2019-05-19 RX ADMIN — HYDROMORPHONE HYDROCHLORIDE 1 MILLIGRAM(S): 2 INJECTION INTRAMUSCULAR; INTRAVENOUS; SUBCUTANEOUS at 01:20

## 2019-05-19 RX ADMIN — ONDANSETRON 4 MILLIGRAM(S): 8 TABLET, FILM COATED ORAL at 08:07

## 2019-05-19 RX ADMIN — MONTELUKAST 10 MILLIGRAM(S): 4 TABLET, CHEWABLE ORAL at 11:50

## 2019-05-19 RX ADMIN — HYDROMORPHONE HYDROCHLORIDE 4 MILLIGRAM(S): 2 INJECTION INTRAMUSCULAR; INTRAVENOUS; SUBCUTANEOUS at 11:04

## 2019-05-19 RX ADMIN — GABAPENTIN 300 MILLIGRAM(S): 400 CAPSULE ORAL at 20:32

## 2019-05-19 RX ADMIN — Medication 100 MILLIGRAM(S): at 05:10

## 2019-05-19 RX ADMIN — Medication 20 MILLIGRAM(S): at 05:11

## 2019-05-19 RX ADMIN — HYDROMORPHONE HYDROCHLORIDE 4 MILLIGRAM(S): 2 INJECTION INTRAMUSCULAR; INTRAVENOUS; SUBCUTANEOUS at 01:30

## 2019-05-19 RX ADMIN — GABAPENTIN 300 MILLIGRAM(S): 400 CAPSULE ORAL at 13:53

## 2019-05-19 RX ADMIN — HYDROMORPHONE HYDROCHLORIDE 4 MILLIGRAM(S): 2 INJECTION INTRAMUSCULAR; INTRAVENOUS; SUBCUTANEOUS at 20:31

## 2019-05-19 RX ADMIN — HYDROMORPHONE HYDROCHLORIDE 2 MILLIGRAM(S): 2 INJECTION INTRAMUSCULAR; INTRAVENOUS; SUBCUTANEOUS at 16:49

## 2019-05-19 RX ADMIN — GABAPENTIN 300 MILLIGRAM(S): 400 CAPSULE ORAL at 05:09

## 2019-05-19 RX ADMIN — HYDROMORPHONE HYDROCHLORIDE 1 MILLIGRAM(S): 2 INJECTION INTRAMUSCULAR; INTRAVENOUS; SUBCUTANEOUS at 13:07

## 2019-05-19 RX ADMIN — Medication 1: at 11:50

## 2019-05-19 RX ADMIN — HYDROMORPHONE HYDROCHLORIDE 4 MILLIGRAM(S): 2 INJECTION INTRAMUSCULAR; INTRAVENOUS; SUBCUTANEOUS at 21:01

## 2019-05-19 RX ADMIN — HYDROMORPHONE HYDROCHLORIDE 2 MILLIGRAM(S): 2 INJECTION INTRAMUSCULAR; INTRAVENOUS; SUBCUTANEOUS at 17:02

## 2019-05-19 RX ADMIN — Medication 1 APPLICATION(S): at 13:53

## 2019-05-19 RX ADMIN — HYDROMORPHONE HYDROCHLORIDE 4 MILLIGRAM(S): 2 INJECTION INTRAMUSCULAR; INTRAVENOUS; SUBCUTANEOUS at 00:31

## 2019-05-19 RX ADMIN — HYDROMORPHONE HYDROCHLORIDE 4 MILLIGRAM(S): 2 INJECTION INTRAMUSCULAR; INTRAVENOUS; SUBCUTANEOUS at 05:09

## 2019-05-19 RX ADMIN — SIMVASTATIN 10 MILLIGRAM(S): 20 TABLET, FILM COATED ORAL at 20:32

## 2019-05-19 RX ADMIN — APIXABAN 5 MILLIGRAM(S): 2.5 TABLET, FILM COATED ORAL at 05:09

## 2019-05-19 RX ADMIN — APIXABAN 5 MILLIGRAM(S): 2.5 TABLET, FILM COATED ORAL at 17:19

## 2019-05-19 RX ADMIN — Medication 1: at 08:07

## 2019-05-19 NOTE — PROGRESS NOTE ADULT - SUBJECTIVE AND OBJECTIVE BOX
69yo F examined at bedside and found in mild distress because she cannot get OOB and has pain in her LLE. Overnight patients dressings were changed. It was re-explained to her that she has PRN pain meds ordered but she needs to ask for them. No other complaints. Denies fever, chills, nausea, vomiting, diarrhea, constipation, chest pain, and sob.     Vital Signs Last 24 Hrs  T(C): 37.2 (18 May 2019 23:41), Max: 37.8 (18 May 2019 18:14)  T(F): 99 (18 May 2019 23:41), Max: 100 (18 May 2019 18:14)  HR: 85 (18 May 2019 23:41) (85 - 115)  BP: 138/78 (18 May 2019 23:41) (132/68 - 143/78)  BP(mean): --  RR: 18 (18 May 2019 23:41) (18 - 18)  SpO2: 98% (18 May 2019 18:14) (98% - 98%)    I&O's Detail    17 May 2019 07:01  -  18 May 2019 07:00  --------------------------------------------------------  IN:    heparin Infusion: 75 mL  Total IN: 75 mL    OUT:    Voided: 350 mL  Total OUT: 350 mL    Total NET: -275 mL      Constitutional: AAOx3  HEENT: EOMI / PERRLA, MMM  Respiratory: non labored breathing.  Cardiovascular: RRR  Gastrointestinal: Abdomen soft, non-tender, non-distended, no rebound tenderness / guarding. right groin with no signs of hematoma no erythema around access site.   MSK: LLE chronic calf wound that is not improving. Dressing was changed.     LABS:                        10.4   8.5   )-----------( 381      ( 18 May 2019 11:41 )             33.4     05-18    135  |  92<L>  |  5.0<L>  ----------------------------<  170<H>  3.5   |  30.0<H>  |  0.44<L>    Ca    8.7      18 May 2019 11:41  Phos  3.7     05-18  Mg     1.7     05-18      PTT - ( 18 May 2019 11:41 )  PTT:34.7 sec      MEDICATIONS  (STANDING):  apixaban 5 milliGRAM(s) Oral every 12 hours  collagenase Ointment 1 Application(s) Topical daily  dextrose 5%. 1000 milliLiter(s) (50 mL/Hr) IV Continuous <Continuous>  dextrose 50% Injectable 12.5 Gram(s) IV Push once  dextrose 50% Injectable 25 Gram(s) IV Push once  dextrose 50% Injectable 25 Gram(s) IV Push once  dicyclomine 20 milliGRAM(s) Oral before breakfast  diltiazem    Tablet 120 milliGRAM(s) Oral daily  furosemide    Tablet 40 milliGRAM(s) Oral two times a day  gabapentin 300 milliGRAM(s) Oral three times a day  insulin lispro (HumaLOG) corrective regimen sliding scale   SubCutaneous three times a day before meals  magnesium oxide 400 milliGRAM(s) Oral daily  metoprolol succinate  milliGRAM(s) Oral daily  montelukast 10 milliGRAM(s) Oral daily  simvastatin 10 milliGRAM(s) Oral at bedtime    MEDICATIONS  (PRN):  acetaminophen   Tablet .. 650 milliGRAM(s) Oral every 6 hours PRN Temp greater or equal to 38C (100.4F), Mild Pain (1 - 3)  dextrose 40% Gel 15 Gram(s) Oral once PRN Blood Glucose LESS THAN 70 milliGRAM(s)/deciliter  glucagon  Injectable 1 milliGRAM(s) IntraMuscular once PRN Glucose LESS THAN 70 milligrams/deciliter  HYDROmorphone   Tablet 2 milliGRAM(s) Oral every 4 hours PRN Moderate Pain (4 - 6)  HYDROmorphone   Tablet 4 milliGRAM(s) Oral every 4 hours PRN Severe Pain (7 - 10)  HYDROmorphone  Injectable 1 milliGRAM(s) IV Push every 3 hours PRN Severe Pain (7 - 10), breakthru  ondansetron Injectable 4 milliGRAM(s) IV Push every 6 hours PRN Nausea and/or Vomiting

## 2019-05-20 LAB
ANION GAP SERPL CALC-SCNC: 12 MMOL/L — SIGNIFICANT CHANGE UP (ref 5–17)
APTT BLD: 40.3 SEC — HIGH (ref 27.5–36.3)
BUN SERPL-MCNC: 7 MG/DL — LOW (ref 8–20)
CALCIUM SERPL-MCNC: 8.8 MG/DL — SIGNIFICANT CHANGE UP (ref 8.6–10.2)
CHLORIDE SERPL-SCNC: 93 MMOL/L — LOW (ref 98–107)
CO2 SERPL-SCNC: 28 MMOL/L — SIGNIFICANT CHANGE UP (ref 22–29)
CREAT SERPL-MCNC: 0.49 MG/DL — LOW (ref 0.5–1.3)
GLUCOSE SERPL-MCNC: 127 MG/DL — HIGH (ref 70–115)
HCT VFR BLD CALC: 35.2 % — LOW (ref 37–47)
HGB BLD-MCNC: 10.9 G/DL — LOW (ref 12–16)
INR BLD: 1.64 RATIO — HIGH (ref 0.88–1.16)
MAGNESIUM SERPL-MCNC: 1.8 MG/DL — SIGNIFICANT CHANGE UP (ref 1.6–2.6)
MCHC RBC-ENTMCNC: 29.1 PG — SIGNIFICANT CHANGE UP (ref 27–31)
MCHC RBC-ENTMCNC: 31 G/DL — LOW (ref 32–36)
MCV RBC AUTO: 94.1 FL — SIGNIFICANT CHANGE UP (ref 81–99)
PLATELET # BLD AUTO: 398 K/UL — SIGNIFICANT CHANGE UP (ref 150–400)
POTASSIUM SERPL-MCNC: 3.8 MMOL/L — SIGNIFICANT CHANGE UP (ref 3.5–5.3)
POTASSIUM SERPL-SCNC: 3.8 MMOL/L — SIGNIFICANT CHANGE UP (ref 3.5–5.3)
PROTHROM AB SERPL-ACNC: 19.2 SEC — HIGH (ref 10–12.9)
RBC # BLD: 3.74 M/UL — LOW (ref 4.4–5.2)
RBC # FLD: 14.8 % — SIGNIFICANT CHANGE UP (ref 11–15.6)
SODIUM SERPL-SCNC: 133 MMOL/L — LOW (ref 135–145)
WBC # BLD: 8.7 K/UL — SIGNIFICANT CHANGE UP (ref 4.8–10.8)
WBC # FLD AUTO: 8.7 K/UL — SIGNIFICANT CHANGE UP (ref 4.8–10.8)

## 2019-05-20 PROCEDURE — 71045 X-RAY EXAM CHEST 1 VIEW: CPT | Mod: 26

## 2019-05-20 PROCEDURE — 99233 SBSQ HOSP IP/OBS HIGH 50: CPT

## 2019-05-20 RX ORDER — SODIUM CHLORIDE 9 MG/ML
1000 INJECTION INTRAMUSCULAR; INTRAVENOUS; SUBCUTANEOUS
Refills: 0 | Status: DISCONTINUED | OUTPATIENT
Start: 2019-05-20 | End: 2019-05-21

## 2019-05-20 RX ORDER — SODIUM CHLORIDE 9 MG/ML
500 INJECTION INTRAMUSCULAR; INTRAVENOUS; SUBCUTANEOUS ONCE
Refills: 0 | Status: COMPLETED | OUTPATIENT
Start: 2019-05-20 | End: 2019-05-20

## 2019-05-20 RX ORDER — ACETAMINOPHEN 500 MG
1000 TABLET ORAL ONCE
Refills: 0 | Status: COMPLETED | OUTPATIENT
Start: 2019-05-20 | End: 2019-05-20

## 2019-05-20 RX ORDER — HYDROMORPHONE HYDROCHLORIDE 2 MG/ML
1 INJECTION INTRAMUSCULAR; INTRAVENOUS; SUBCUTANEOUS
Refills: 0 | Status: DISCONTINUED | OUTPATIENT
Start: 2019-05-20 | End: 2019-05-20

## 2019-05-20 RX ORDER — ONDANSETRON 8 MG/1
4 TABLET, FILM COATED ORAL EVERY 6 HOURS
Refills: 0 | Status: DISCONTINUED | OUTPATIENT
Start: 2019-05-20 | End: 2019-05-23

## 2019-05-20 RX ORDER — RIVAROXABAN 15 MG-20MG
20 KIT ORAL EVERY 24 HOURS
Refills: 0 | Status: DISCONTINUED | OUTPATIENT
Start: 2019-05-21 | End: 2019-05-23

## 2019-05-20 RX ADMIN — Medication 120 MILLIGRAM(S): at 05:00

## 2019-05-20 RX ADMIN — HYDROMORPHONE HYDROCHLORIDE 4 MILLIGRAM(S): 2 INJECTION INTRAMUSCULAR; INTRAVENOUS; SUBCUTANEOUS at 17:35

## 2019-05-20 RX ADMIN — Medication 650 MILLIGRAM(S): at 15:25

## 2019-05-20 RX ADMIN — GABAPENTIN 300 MILLIGRAM(S): 400 CAPSULE ORAL at 05:01

## 2019-05-20 RX ADMIN — Medication 650 MILLIGRAM(S): at 00:24

## 2019-05-20 RX ADMIN — GABAPENTIN 300 MILLIGRAM(S): 400 CAPSULE ORAL at 15:25

## 2019-05-20 RX ADMIN — HYDROMORPHONE HYDROCHLORIDE 1 MILLIGRAM(S): 2 INJECTION INTRAMUSCULAR; INTRAVENOUS; SUBCUTANEOUS at 18:05

## 2019-05-20 RX ADMIN — HYDROMORPHONE HYDROCHLORIDE 4 MILLIGRAM(S): 2 INJECTION INTRAMUSCULAR; INTRAVENOUS; SUBCUTANEOUS at 16:37

## 2019-05-20 RX ADMIN — Medication 1000 MILLIGRAM(S): at 20:27

## 2019-05-20 RX ADMIN — SIMVASTATIN 10 MILLIGRAM(S): 20 TABLET, FILM COATED ORAL at 21:53

## 2019-05-20 RX ADMIN — HYDROMORPHONE HYDROCHLORIDE 4 MILLIGRAM(S): 2 INJECTION INTRAMUSCULAR; INTRAVENOUS; SUBCUTANEOUS at 01:28

## 2019-05-20 RX ADMIN — Medication 1: at 16:46

## 2019-05-20 RX ADMIN — Medication 20 MILLIGRAM(S): at 05:01

## 2019-05-20 RX ADMIN — APIXABAN 5 MILLIGRAM(S): 2.5 TABLET, FILM COATED ORAL at 05:00

## 2019-05-20 RX ADMIN — HYDROMORPHONE HYDROCHLORIDE 1 MILLIGRAM(S): 2 INJECTION INTRAMUSCULAR; INTRAVENOUS; SUBCUTANEOUS at 17:51

## 2019-05-20 RX ADMIN — HYDROMORPHONE HYDROCHLORIDE 4 MILLIGRAM(S): 2 INJECTION INTRAMUSCULAR; INTRAVENOUS; SUBCUTANEOUS at 00:58

## 2019-05-20 RX ADMIN — MONTELUKAST 10 MILLIGRAM(S): 4 TABLET, CHEWABLE ORAL at 11:59

## 2019-05-20 RX ADMIN — Medication 40 MILLIGRAM(S): at 05:01

## 2019-05-20 RX ADMIN — HYDROMORPHONE HYDROCHLORIDE 4 MILLIGRAM(S): 2 INJECTION INTRAMUSCULAR; INTRAVENOUS; SUBCUTANEOUS at 21:52

## 2019-05-20 RX ADMIN — GABAPENTIN 300 MILLIGRAM(S): 400 CAPSULE ORAL at 21:53

## 2019-05-20 RX ADMIN — SODIUM CHLORIDE 100 MILLILITER(S): 9 INJECTION INTRAMUSCULAR; INTRAVENOUS; SUBCUTANEOUS at 17:51

## 2019-05-20 RX ADMIN — SODIUM CHLORIDE 500 MILLILITER(S): 9 INJECTION INTRAMUSCULAR; INTRAVENOUS; SUBCUTANEOUS at 15:26

## 2019-05-20 RX ADMIN — Medication 100 MILLIGRAM(S): at 17:53

## 2019-05-20 RX ADMIN — HYDROMORPHONE HYDROCHLORIDE 4 MILLIGRAM(S): 2 INJECTION INTRAMUSCULAR; INTRAVENOUS; SUBCUTANEOUS at 11:57

## 2019-05-20 RX ADMIN — HYDROMORPHONE HYDROCHLORIDE 4 MILLIGRAM(S): 2 INJECTION INTRAMUSCULAR; INTRAVENOUS; SUBCUTANEOUS at 12:45

## 2019-05-20 RX ADMIN — HYDROMORPHONE HYDROCHLORIDE 1 MILLIGRAM(S): 2 INJECTION INTRAMUSCULAR; INTRAVENOUS; SUBCUTANEOUS at 06:29

## 2019-05-20 RX ADMIN — HYDROMORPHONE HYDROCHLORIDE 4 MILLIGRAM(S): 2 INJECTION INTRAMUSCULAR; INTRAVENOUS; SUBCUTANEOUS at 22:22

## 2019-05-20 RX ADMIN — HYDROMORPHONE HYDROCHLORIDE 0.5 MILLIGRAM(S): 2 INJECTION INTRAMUSCULAR; INTRAVENOUS; SUBCUTANEOUS at 05:59

## 2019-05-20 RX ADMIN — Medication 400 MILLIGRAM(S): at 19:57

## 2019-05-20 RX ADMIN — Medication 1 APPLICATION(S): at 17:25

## 2019-05-20 RX ADMIN — HYDROMORPHONE HYDROCHLORIDE 4 MILLIGRAM(S): 2 INJECTION INTRAMUSCULAR; INTRAVENOUS; SUBCUTANEOUS at 05:00

## 2019-05-20 RX ADMIN — MAGNESIUM OXIDE 400 MG ORAL TABLET 400 MILLIGRAM(S): 241.3 TABLET ORAL at 11:59

## 2019-05-20 RX ADMIN — Medication 100 MILLIGRAM(S): at 05:01

## 2019-05-20 RX ADMIN — HYDROMORPHONE HYDROCHLORIDE 4 MILLIGRAM(S): 2 INJECTION INTRAMUSCULAR; INTRAVENOUS; SUBCUTANEOUS at 05:30

## 2019-05-20 NOTE — PROGRESS NOTE ADULT - SUBJECTIVE AND OBJECTIVE BOX
ANGELINA [facility]    [id number]    History:     Vascular follow up  chronic bilateral le pain secondary to chronic dermatitis and lipodermatosclerosis  no eliquis  complaining of right shoulder pain and lower extremity pain and nausea   medications help to relieve the pain    Denies vomiting, chest pain, shortness of breath, abdominal pain or fever.   No acute motor or sensory changes are reported.    Vital Signs Last 24 Hrs  T(C): 37.4 (20 May 2019 08:43), Max: 38.7 (19 May 2019 23:43)  T(F): 99.3 (20 May 2019 08:43), Max: 101.7 (19 May 2019 23:43)  HR: 86 (20 May 2019 08:43) (86 - 110)  BP: 115/58 (20 May 2019 08:43) (107/64 - 125/61)  BP(mean): --  RR: 18 (20 May 2019 08:43) (18 - 18)  SpO2: 95% (20 May 2019 08:43) (90% - 95%)  I&O's Summary                            10.9   8.7   )-----------( 398      ( 20 May 2019 05:40 )             35.2     05-20    133<L>  |  93<L>  |  7.0<L>  ----------------------------<  127<H>  3.8   |  28.0  |  0.49<L>    Ca    8.8      20 May 2019 05:40  Phos  4.3     05-19  Mg     1.8     05-20        MEDICATIONS  (STANDING):  apixaban 5 milliGRAM(s) Oral every 12 hours  collagenase Ointment 1 Application(s) Topical daily  dextrose 5%. 1000 milliLiter(s) (50 mL/Hr) IV Continuous <Continuous>  dextrose 50% Injectable 12.5 Gram(s) IV Push once  dextrose 50% Injectable 25 Gram(s) IV Push once  dextrose 50% Injectable 25 Gram(s) IV Push once  dicyclomine 20 milliGRAM(s) Oral before breakfast  diltiazem    Tablet 120 milliGRAM(s) Oral daily  furosemide    Tablet 40 milliGRAM(s) Oral two times a day  gabapentin 300 milliGRAM(s) Oral three times a day  HYDROmorphone  Injectable 1 milliGRAM(s) IV Push every 3 hours  insulin lispro (HumaLOG) corrective regimen sliding scale   SubCutaneous three times a day before meals  magnesium oxide 400 milliGRAM(s) Oral daily  metoprolol succinate  milliGRAM(s) Oral daily  montelukast 10 milliGRAM(s) Oral daily  simvastatin 10 milliGRAM(s) Oral at bedtime    MEDICATIONS  (PRN):  acetaminophen   Tablet .. 650 milliGRAM(s) Oral every 6 hours PRN Temp greater or equal to 38C (100.4F), Mild Pain (1 - 3)  dextrose 40% Gel 15 Gram(s) Oral once PRN Blood Glucose LESS THAN 70 milliGRAM(s)/deciliter  glucagon  Injectable 1 milliGRAM(s) IntraMuscular once PRN Glucose LESS THAN 70 milligrams/deciliter  HYDROmorphone   Tablet 2 milliGRAM(s) Oral every 4 hours PRN Moderate Pain (4 - 6)  HYDROmorphone   Tablet 4 milliGRAM(s) Oral every 4 hours PRN Severe Pain (7 - 10)  HYDROmorphone  Injectable 1 milliGRAM(s) IV Push every 3 hours PRN Severe Pain (7 - 10), breakthru  ondansetron Injectable 4 milliGRAM(s) IV Push every 6 hours PRN Nausea and/or Vomiting  ondansetron Injectable 4 milliGRAM(s) IV Push every 6 hours PRN Nausea and/or Vomiting      Physical exam:   No distress  non labored breathing.  The dressing is clean, dry and intact.   Sensation to light touch is grossly intact distally.   Motor function distally is 5/5. No foot drop.  No cyanosis  passive right shoulder ROM in place     Assessment:  • Chronic stasis of the LE    - no acute pathology to the right shoulder     •   Plan:   • Plan for discharge today  - Eliquis on discharge

## 2019-05-20 NOTE — PHYSICAL THERAPY INITIAL EVALUATION ADULT - ACTIVE RANGE OF MOTION EXAMINATION, REHAB EVAL
assessed during functional mobility, resistance not applied/bilateral upper extremity Active ROM was WFL (within functional limits)/bilateral  lower extremity Active ROM was WFL (within functional limits)

## 2019-05-20 NOTE — CHART NOTE - NSCHARTNOTEFT_GEN_A_CORE
Pt insurance denied preauth for Eliquis however approved for pt to have Xarelto  Therefore will complete evening dose of Eliquis tonight then dc and start Xarelto 20 mg qd in am.

## 2019-05-20 NOTE — PROGRESS NOTE ADULT - SUBJECTIVE AND OBJECTIVE BOX
[place] [facility]                                                       [facility]                                                        Office: [address] [address]                                                       Telephone: [phone]. Fax:[phone]                                                                             PROGRESS NOTE   Reason for follow up: pre-operative cardiovascular risk evaluation and management , diastolic heart failure, peripheral vascular disease                             Overnight: fever  Update: s/p left leg Wound debridement done on 5/16/2019   S/p Left leg angioplasty left SFA today.  Over the weekend. Intermittent fevers. Likely due to atelectasis No other source found. On .ABx.       Subjective: "i have a lot of pain in my left leg,.  "   Complains of:  c/o pain. left leg pain .  Review of symptoms: Cardiac:  No chest pain. No dyspnea. No palpitations.  Respiratory: no cough. No dyspnea  Gastrointestinal: No diarrhea. No abdominal pain. No bleeding.     Past medical history: No updates.   Chronic conditions:  Hypertension: controlled. CHF: Stable.  	  Vitals:  Vital Signs Last 24 Hrs  T(C): 38.7 (05-20-19 @ 15:22), Max: 38.7 (05-19-19 @ 23:43)  T(F): 101.7 (05-20-19 @ 15:22), Max: 101.7 (05-19-19 @ 23:43)  HR: 100 (05-20-19 @ 15:22) (86 - 110)  BP: 115/73 (05-20-19 @ 15:22) (108/65 - 125/61)  BP(mean): --  RR: 18 (05-20-19 @ 15:22) (18 - 18)  SpO2: 98% (05-20-19 @ 15:22) (90% - 98%)        PHYSICAL EXAM:  Appearance: Comfortable. No acute distress  HEENT:  Head and neck: Atraumatic. Normocephalic.  Normal oral mucosa, PERRL, Neck is supple. No carotid bruit.   Neurologic: A & O x 3, no focal deficits. EOMI , Cranial nerves are intact.  Lymphatic: No cervical lymphadenopathy  Cardiovascular: Normal S1 S2, 3/6 systolic ejection murmur, rubs/gallops.   Respiratory: bilateral basal crepts.   Gastrointestinal:  Soft, Non-tender, + BS  Lower Extremities:  wrapped in ace wrap. wound not examined.   Psychiatry: Patient is calm. No agitation. Mood & affect appropriate  Skin: No rashes/ ecchymoses/cyanosis/ulcers visualized on the face,     CURRENT MEDICATIONS:     MEDICATIONS  (STANDING):  MEDICATIONS  (STANDING):  diltiazem    Tablet 120 milliGRAM(s) Oral daily  furosemide    Tablet 40 milliGRAM(s) Oral two times a day  metoprolol succinate  milliGRAM(s) Oral daily    montelukast  dicyclomine  apixaban  collagenase Ointment  dextrose 5%.  dextrose 50% Injectable  dextrose 50% Injectable  dextrose 50% Injectable  gabapentin  insulin lispro (HumaLOG) corrective regimen sliding scale  simvastatin    PRN: acetaminophen   Tablet .. PRN  dextrose 40% Gel PRN  glucagon  Injectable PRN  HYDROmorphone   Tablet PRN  HYDROmorphone   Tablet PRN  HYDROmorphone  Injectable PRN  ondansetron Injectable PRN  ondansetron Injectable PRN                                     10.9   8.7   )-----------( 398      ( 20 May 2019 05:40 )             35.2   N=x    ; L=x        20 May 2019 05:40    133    |  93     |  7.0    ----------------------------<  127    3.8     |  28.0   |  0.49     Ca    8.8        20 May 2019 05:40  Phos  4.3       19 May 2019 09:15  Mg     1.8       20 May 2019 05:40    Lipid Profile:   HgA1c: TSH:     TELEMETRY: Reviewed  no events.   ECG:  Reviewed by me. < from: 12 Lead ECG (05.13.19 @ 09:20) >  Atrial fibrillation with a   	    DIAGNOSTIC TESTING:  [ ] Echocardiogram: LVEF 48%. Severe LAE. moderate aortic stenosis.   [ ] Stress Test:  Stress echo. dobutamine No ischemia. Mild LV dysfunction on resting echo with inotropic contractile reserve.

## 2019-05-20 NOTE — PHYSICAL THERAPY INITIAL EVALUATION ADULT - PHYSICAL ASSIST/NONPHYSICAL ASSIST: GAIT, REHAB EVAL
I have reviewed and confirmed nurses' notes... nonverbal cues (demo/gestures)/1 person assist/verbal cues

## 2019-05-20 NOTE — PHYSICAL THERAPY INITIAL EVALUATION ADULT - CRITERIA FOR SKILLED THERAPEUTIC INTERVENTIONS
impairments found/anticipated equipment needs at discharge/therapy frequency/anticipated discharge recommendation/functional limitations in following categories/risk reduction/prevention/rehab potential/predicted duration of therapy intervention

## 2019-05-20 NOTE — PHARMACOTHERAPY INTERVENTION NOTE - COMMENTS
s/w vascular PA to review prn indications for pain meds. Dilaudid duplicate order discontinued
Recommended to change Vancomycin  dose to 1gm every 8hours .
Vancomycin level ordered for 5/14 prior to 10am dose.

## 2019-05-20 NOTE — PHYSICAL THERAPY INITIAL EVALUATION ADULT - IMPAIRMENTS FOUND, PT EVAL
gait, locomotion, and balance/neuromotor development and sensory integration/aerobic capacity/endurance

## 2019-05-20 NOTE — PHYSICAL THERAPY INITIAL EVALUATION ADULT - ADDITIONAL COMMENTS
as per pt. resides in the private house with , 3 steps (+) rail to enter, occasional use of SAC,  available to assist pt. as needed upon D/C home

## 2019-05-21 LAB
ANION GAP SERPL CALC-SCNC: 13 MMOL/L — SIGNIFICANT CHANGE UP (ref 5–17)
BUN SERPL-MCNC: 6 MG/DL — LOW (ref 8–20)
CALCIUM SERPL-MCNC: 8.7 MG/DL — SIGNIFICANT CHANGE UP (ref 8.6–10.2)
CHLORIDE SERPL-SCNC: 93 MMOL/L — LOW (ref 98–107)
CO2 SERPL-SCNC: 28 MMOL/L — SIGNIFICANT CHANGE UP (ref 22–29)
CREAT SERPL-MCNC: 0.49 MG/DL — LOW (ref 0.5–1.3)
GLUCOSE SERPL-MCNC: 125 MG/DL — HIGH (ref 70–115)
HCT VFR BLD CALC: 33.6 % — LOW (ref 37–47)
HGB BLD-MCNC: 10.5 G/DL — LOW (ref 12–16)
MAGNESIUM SERPL-MCNC: 1.6 MG/DL — SIGNIFICANT CHANGE UP (ref 1.6–2.6)
MCHC RBC-ENTMCNC: 29.4 PG — SIGNIFICANT CHANGE UP (ref 27–31)
MCHC RBC-ENTMCNC: 31.3 G/DL — LOW (ref 32–36)
MCV RBC AUTO: 94.1 FL — SIGNIFICANT CHANGE UP (ref 81–99)
PHOSPHATE SERPL-MCNC: 3.9 MG/DL — SIGNIFICANT CHANGE UP (ref 2.4–4.7)
PLATELET # BLD AUTO: 458 K/UL — HIGH (ref 150–400)
POTASSIUM SERPL-MCNC: 3.5 MMOL/L — SIGNIFICANT CHANGE UP (ref 3.5–5.3)
POTASSIUM SERPL-SCNC: 3.5 MMOL/L — SIGNIFICANT CHANGE UP (ref 3.5–5.3)
RBC # BLD: 3.57 M/UL — LOW (ref 4.4–5.2)
RBC # FLD: 14.8 % — SIGNIFICANT CHANGE UP (ref 11–15.6)
SODIUM SERPL-SCNC: 134 MMOL/L — LOW (ref 135–145)
WBC # BLD: 10.7 K/UL — SIGNIFICANT CHANGE UP (ref 4.8–10.8)
WBC # FLD AUTO: 10.7 K/UL — SIGNIFICANT CHANGE UP (ref 4.8–10.8)

## 2019-05-21 PROCEDURE — 73701 CT LOWER EXTREMITY W/DYE: CPT | Mod: 26,LT

## 2019-05-21 PROCEDURE — 99233 SBSQ HOSP IP/OBS HIGH 50: CPT

## 2019-05-21 RX ORDER — ACETAMINOPHEN 500 MG
1000 TABLET ORAL ONCE
Refills: 0 | Status: COMPLETED | OUTPATIENT
Start: 2019-05-21 | End: 2019-05-23

## 2019-05-21 RX ORDER — MAGNESIUM OXIDE 400 MG ORAL TABLET 241.3 MG
400 TABLET ORAL ONCE
Refills: 0 | Status: COMPLETED | OUTPATIENT
Start: 2019-05-21 | End: 2019-05-21

## 2019-05-21 RX ORDER — DOCUSATE SODIUM 100 MG
100 CAPSULE ORAL DAILY
Refills: 0 | Status: DISCONTINUED | OUTPATIENT
Start: 2019-05-21 | End: 2019-05-23

## 2019-05-21 RX ORDER — POTASSIUM CHLORIDE 20 MEQ
20 PACKET (EA) ORAL
Refills: 0 | Status: COMPLETED | OUTPATIENT
Start: 2019-05-21 | End: 2019-05-21

## 2019-05-21 RX ORDER — SODIUM CHLORIDE 9 MG/ML
1000 INJECTION INTRAMUSCULAR; INTRAVENOUS; SUBCUTANEOUS ONCE
Refills: 0 | Status: COMPLETED | OUTPATIENT
Start: 2019-05-21 | End: 2019-05-21

## 2019-05-21 RX ADMIN — HYDROMORPHONE HYDROCHLORIDE 1 MILLIGRAM(S): 2 INJECTION INTRAMUSCULAR; INTRAVENOUS; SUBCUTANEOUS at 07:20

## 2019-05-21 RX ADMIN — Medication 650 MILLIGRAM(S): at 00:15

## 2019-05-21 RX ADMIN — Medication 100 MILLIGRAM(S): at 05:22

## 2019-05-21 RX ADMIN — MONTELUKAST 10 MILLIGRAM(S): 4 TABLET, CHEWABLE ORAL at 13:23

## 2019-05-21 RX ADMIN — Medication 20 MILLIGRAM(S): at 05:22

## 2019-05-21 RX ADMIN — Medication 100 MILLIGRAM(S): at 01:51

## 2019-05-21 RX ADMIN — ONDANSETRON 4 MILLIGRAM(S): 8 TABLET, FILM COATED ORAL at 13:24

## 2019-05-21 RX ADMIN — Medication 100 MILLIGRAM(S): at 18:17

## 2019-05-21 RX ADMIN — Medication 650 MILLIGRAM(S): at 18:23

## 2019-05-21 RX ADMIN — HYDROMORPHONE HYDROCHLORIDE 4 MILLIGRAM(S): 2 INJECTION INTRAMUSCULAR; INTRAVENOUS; SUBCUTANEOUS at 22:48

## 2019-05-21 RX ADMIN — HYDROMORPHONE HYDROCHLORIDE 4 MILLIGRAM(S): 2 INJECTION INTRAMUSCULAR; INTRAVENOUS; SUBCUTANEOUS at 23:48

## 2019-05-21 RX ADMIN — Medication 20 MILLIEQUIVALENT(S): at 17:10

## 2019-05-21 RX ADMIN — Medication 1 APPLICATION(S): at 17:14

## 2019-05-21 RX ADMIN — GABAPENTIN 300 MILLIGRAM(S): 400 CAPSULE ORAL at 20:33

## 2019-05-21 RX ADMIN — HYDROMORPHONE HYDROCHLORIDE 4 MILLIGRAM(S): 2 INJECTION INTRAMUSCULAR; INTRAVENOUS; SUBCUTANEOUS at 10:58

## 2019-05-21 RX ADMIN — Medication 120 MILLIGRAM(S): at 05:22

## 2019-05-21 RX ADMIN — GABAPENTIN 300 MILLIGRAM(S): 400 CAPSULE ORAL at 13:23

## 2019-05-21 RX ADMIN — HYDROMORPHONE HYDROCHLORIDE 4 MILLIGRAM(S): 2 INJECTION INTRAMUSCULAR; INTRAVENOUS; SUBCUTANEOUS at 05:49

## 2019-05-21 RX ADMIN — GABAPENTIN 300 MILLIGRAM(S): 400 CAPSULE ORAL at 05:22

## 2019-05-21 RX ADMIN — SODIUM CHLORIDE 100 MILLILITER(S): 9 INJECTION INTRAMUSCULAR; INTRAVENOUS; SUBCUTANEOUS at 17:30

## 2019-05-21 RX ADMIN — HYDROMORPHONE HYDROCHLORIDE 1 MILLIGRAM(S): 2 INJECTION INTRAMUSCULAR; INTRAVENOUS; SUBCUTANEOUS at 07:08

## 2019-05-21 RX ADMIN — Medication 100 MILLIGRAM(S): at 11:10

## 2019-05-21 RX ADMIN — Medication 100 MILLIGRAM(S): at 17:10

## 2019-05-21 RX ADMIN — Medication 20 MILLIEQUIVALENT(S): at 20:33

## 2019-05-21 RX ADMIN — HYDROMORPHONE HYDROCHLORIDE 1 MILLIGRAM(S): 2 INJECTION INTRAMUSCULAR; INTRAVENOUS; SUBCUTANEOUS at 13:33

## 2019-05-21 RX ADMIN — RIVAROXABAN 20 MILLIGRAM(S): KIT at 05:22

## 2019-05-21 RX ADMIN — HYDROMORPHONE HYDROCHLORIDE 4 MILLIGRAM(S): 2 INJECTION INTRAMUSCULAR; INTRAVENOUS; SUBCUTANEOUS at 19:05

## 2019-05-21 RX ADMIN — HYDROMORPHONE HYDROCHLORIDE 4 MILLIGRAM(S): 2 INJECTION INTRAMUSCULAR; INTRAVENOUS; SUBCUTANEOUS at 18:15

## 2019-05-21 RX ADMIN — HYDROMORPHONE HYDROCHLORIDE 1 MILLIGRAM(S): 2 INJECTION INTRAMUSCULAR; INTRAVENOUS; SUBCUTANEOUS at 13:23

## 2019-05-21 RX ADMIN — Medication 40 MILLIGRAM(S): at 05:22

## 2019-05-21 RX ADMIN — HYDROMORPHONE HYDROCHLORIDE 4 MILLIGRAM(S): 2 INJECTION INTRAMUSCULAR; INTRAVENOUS; SUBCUTANEOUS at 06:40

## 2019-05-21 RX ADMIN — MAGNESIUM OXIDE 400 MG ORAL TABLET 400 MILLIGRAM(S): 241.3 TABLET ORAL at 17:10

## 2019-05-21 RX ADMIN — HYDROMORPHONE HYDROCHLORIDE 4 MILLIGRAM(S): 2 INJECTION INTRAMUSCULAR; INTRAVENOUS; SUBCUTANEOUS at 11:50

## 2019-05-21 RX ADMIN — SIMVASTATIN 10 MILLIGRAM(S): 20 TABLET, FILM COATED ORAL at 20:33

## 2019-05-21 NOTE — PROGRESS NOTE ADULT - SUBJECTIVE AND OBJECTIVE BOX
ANGELINA [facility]    [id number]    History:     vascular follow up.  patient reports feeling better. but still reports pain to the left le  Fever w/o started yesterday.  Cxr clear  culture initial report pending       Vital Signs Last 24 Hrs  T(C): 37.3 (21 May 2019 08:58), Max: 38.7 (20 May 2019 15:22)  T(F): 99.1 (21 May 2019 08:58), Max: 101.7 (20 May 2019 15:22)  HR: 89 (21 May 2019 08:58) (60 - 103)  BP: 95/52 (21 May 2019 08:58) (95/52 - 115/73)  BP(mean): --  RR: 18 (21 May 2019 08:58) (16 - 18)  SpO2: 95% (21 May 2019 08:58) (95% - 98%)  I&O's Summary    20 May 2019 07:01  -  21 May 2019 07:00  --------------------------------------------------------  IN: 700 mL / OUT: 0 mL / NET: 700 mL                              10.5   10.7  )-----------( 458      ( 21 May 2019 10:00 )             33.6     05-21    134<L>  |  93<L>  |  6.0<L>  ----------------------------<  125<H>  3.5   |  28.0  |  0.49<L>    Ca    8.7      21 May 2019 10:00  Phos  3.9     05-21  Mg     1.6     05-21        MEDICATIONS  (STANDING):  clindamycin IVPB 900 milliGRAM(s) IV Intermittent every 8 hours  collagenase Ointment 1 Application(s) Topical daily  dextrose 5%. 1000 milliLiter(s) (50 mL/Hr) IV Continuous <Continuous>  dextrose 50% Injectable 12.5 Gram(s) IV Push once  dextrose 50% Injectable 25 Gram(s) IV Push once  dextrose 50% Injectable 25 Gram(s) IV Push once  dicyclomine 20 milliGRAM(s) Oral before breakfast  diltiazem    Tablet 120 milliGRAM(s) Oral daily  furosemide    Tablet 40 milliGRAM(s) Oral two times a day  gabapentin 300 milliGRAM(s) Oral three times a day  insulin lispro (HumaLOG) corrective regimen sliding scale   SubCutaneous three times a day before meals  metoprolol succinate  milliGRAM(s) Oral daily  montelukast 10 milliGRAM(s) Oral daily  rivaroxaban 20 milliGRAM(s) Oral every 24 hours  simvastatin 10 milliGRAM(s) Oral at bedtime  sodium chloride 0.9%. 1000 milliLiter(s) (100 mL/Hr) IV Continuous <Continuous>    MEDICATIONS  (PRN):  acetaminophen   Tablet .. 650 milliGRAM(s) Oral every 6 hours PRN Temp greater or equal to 38C (100.4F), Mild Pain (1 - 3)  dextrose 40% Gel 15 Gram(s) Oral once PRN Blood Glucose LESS THAN 70 milliGRAM(s)/deciliter  glucagon  Injectable 1 milliGRAM(s) IntraMuscular once PRN Glucose LESS THAN 70 milligrams/deciliter  HYDROmorphone   Tablet 2 milliGRAM(s) Oral every 4 hours PRN Moderate Pain (4 - 6)  HYDROmorphone   Tablet 4 milliGRAM(s) Oral every 4 hours PRN Severe Pain (7 - 10)  HYDROmorphone  Injectable 1 milliGRAM(s) IV Push every 3 hours PRN Severe Pain (7 - 10), breakthru  ondansetron Injectable 4 milliGRAM(s) IV Push every 6 hours PRN Nausea and/or Vomiting  ondansetron Injectable 4 milliGRAM(s) IV Push every 6 hours PRN Nausea and/or Vomiting      Physical exam:     No distress  non labored breathing   obese abdomen, soft   dressing changed  left le with multiple superficial ulcerations with necrotic patches, surrounding blanchable erythema.  patient is extremely sensitive to touch  Foot is warm, no gross motor deficits.  Capillary refill is less than 2 seconds. No cyanosis.     Assessment:  • left le chronic ulceration, pain and fever     Plan:   • IV abx restarted yesterday, continue and follow up blood cultures   - will obtain ct of the LLe to rule out a deep abscess component

## 2019-05-21 NOTE — PROGRESS NOTE ADULT - SUBJECTIVE AND OBJECTIVE BOX
[place] [facility]                                                        Office: [address] [address]                                                       Telephone: [phone]. Fax:[phone]                                                                             PROGRESS NOTE   Reason for follow up: pre-operative cardiovascular risk evaluation and management , diastolic heart failure, peripheral vascular disease                             Overnight: fever  Update: s/p left leg Wound debridement done on 5/16/2019   S/p Left leg angioplasty left SFA today.  Examined her in the [address].   still complains of leg pain.       Subjective: "i want to go home"   Complains of:  c/o pain. left leg pain .  Review of symptoms: Cardiac:  No chest pain. No dyspnea. No palpitations.  Respiratory: no cough. No dyspnea  Gastrointestinal: No diarrhea. No abdominal pain. No bleeding.     Past medical history: No updates.   Chronic conditions:  Hypertension: controlled. CHF: Stable.  	  Vitals:  Vital Signs Last 24 Hrs  T(C): 37.4 (05-21-19 @ 15:50), Max: 37.4 (05-21-19 @ 15:50)  T(F): 99.4 (05-21-19 @ 15:50), Max: 99.4 (05-21-19 @ 15:50)  HR: 96 (05-21-19 @ 15:50) (60 - 96)  BP: 101/66 (05-21-19 @ 15:50) (95/52 - 114/74)  BP(mean): --  RR: 18 (05-21-19 @ 15:50) (18 - 18)  SpO2: 97% (05-21-19 @ 15:50) (95% - 98%)        PHYSICAL EXAM:  Appearance: Comfortable. No acute distress  HEENT:  Head and neck: Atraumatic. Normocephalic.  Normal oral mucosa, PERRL, Neck is supple. No carotid bruit.   Neurologic: A & O x 3, no focal deficits. EOMI , Cranial nerves are intact.  Lymphatic: No cervical lymphadenopathy  Cardiovascular: Normal S1 S2, 3/6 systolic ejection murmur, rubs/gallops.   Respiratory: bilateral basal crepts.   Gastrointestinal:  Soft, Non-tender, + BS  Lower Extremities:  wrapped in ace wrap. wound not examined.   Psychiatry: Patient is calm. No agitation. Mood & affect appropriate  Skin: No rashes/ ecchymoses/cyanosis/ulcers visualized on the face,     CURRENT MEDICATIONS:     MEDICATIONS  (STANDING):  MEDICATIONS  (STANDING):  diltiazem    Tablet 120 milliGRAM(s) Oral daily  furosemide    Tablet 40 milliGRAM(s) Oral two times a day  metoprolol succinate  milliGRAM(s) Oral daily    montelukast  dicyclomine  clindamycin IVPB  collagenase Ointment  dextrose 5%.  dextrose 50% Injectable  dextrose 50% Injectable  dextrose 50% Injectable  gabapentin  insulin lispro (HumaLOG) corrective regimen sliding scale  potassium chloride    Tablet ER  rivaroxaban  simvastatin  sodium chloride 0.9%.    PRN: acetaminophen   Tablet .. PRN  dextrose 40% Gel PRN  glucagon  Injectable PRN  HYDROmorphone   Tablet PRN  HYDROmorphone   Tablet PRN  HYDROmorphone  Injectable PRN  ondansetron Injectable PRN  ondansetron Injectable PRN                                         10.5   10.7  )-----------( 458      ( 21 May 2019 10:00 )             33.6   N=x    ; L=x        21 May 2019 10:00    134    |  93     |  6.0    ----------------------------<  125    3.5     |  28.0   |  0.49     Ca    8.7        21 May 2019 10:00  Phos  3.9       21 May 2019 10:00  Mg     1.6       21 May 2019 10:00    HgA1c: TSH:     TELEMETRY: Reviewed  no events.   ECG:  Reviewed by me. < from: 12 Lead ECG (05.13.19 @ 09:20) >  Atrial fibrillation with a   	    DIAGNOSTIC TESTING:  [ ] Echocardiogram: LVEF 48%. Severe LAE. moderate aortic stenosis.   [ ] Stress Test:  Stress echo. dobutamine No ischemia. Mild LV dysfunction on resting echo with inotropic contractile reserve.

## 2019-05-22 LAB
ANION GAP SERPL CALC-SCNC: 14 MMOL/L — SIGNIFICANT CHANGE UP (ref 5–17)
BASOPHILS # BLD AUTO: 0 K/UL — SIGNIFICANT CHANGE UP (ref 0–0.2)
BASOPHILS NFR BLD AUTO: 0.6 % — SIGNIFICANT CHANGE UP (ref 0–2)
BUN SERPL-MCNC: 5 MG/DL — LOW (ref 8–20)
CALCIUM SERPL-MCNC: 8.5 MG/DL — LOW (ref 8.6–10.2)
CHLORIDE SERPL-SCNC: 94 MMOL/L — LOW (ref 98–107)
CO2 SERPL-SCNC: 26 MMOL/L — SIGNIFICANT CHANGE UP (ref 22–29)
CREAT SERPL-MCNC: 0.54 MG/DL — SIGNIFICANT CHANGE UP (ref 0.5–1.3)
EOSINOPHIL # BLD AUTO: 0.3 K/UL — SIGNIFICANT CHANGE UP (ref 0–0.5)
EOSINOPHIL NFR BLD AUTO: 3.4 % — SIGNIFICANT CHANGE UP (ref 0–6)
GLUCOSE SERPL-MCNC: 146 MG/DL — HIGH (ref 70–115)
HCT VFR BLD CALC: 34.8 % — LOW (ref 37–47)
HGB BLD-MCNC: 11 G/DL — LOW (ref 12–16)
LYMPHOCYTES # BLD AUTO: 1.1 K/UL — SIGNIFICANT CHANGE UP (ref 1–4.8)
LYMPHOCYTES # BLD AUTO: 13.9 % — LOW (ref 20–55)
MAGNESIUM SERPL-MCNC: 1.6 MG/DL — SIGNIFICANT CHANGE UP (ref 1.6–2.6)
MCHC RBC-ENTMCNC: 29.6 PG — SIGNIFICANT CHANGE UP (ref 27–31)
MCHC RBC-ENTMCNC: 31.6 G/DL — LOW (ref 32–36)
MCV RBC AUTO: 93.5 FL — SIGNIFICANT CHANGE UP (ref 81–99)
MONOCYTES # BLD AUTO: 0.8 K/UL — SIGNIFICANT CHANGE UP (ref 0–0.8)
MONOCYTES NFR BLD AUTO: 9.7 % — SIGNIFICANT CHANGE UP (ref 3–10)
NEUTROPHILS # BLD AUTO: 5.8 K/UL — SIGNIFICANT CHANGE UP (ref 1.8–8)
NEUTROPHILS NFR BLD AUTO: 71.5 % — SIGNIFICANT CHANGE UP (ref 37–73)
PHOSPHATE SERPL-MCNC: 4 MG/DL — SIGNIFICANT CHANGE UP (ref 2.4–4.7)
PLATELET # BLD AUTO: 455 K/UL — HIGH (ref 150–400)
POTASSIUM SERPL-MCNC: 3.4 MMOL/L — LOW (ref 3.5–5.3)
POTASSIUM SERPL-SCNC: 3.4 MMOL/L — LOW (ref 3.5–5.3)
RBC # BLD: 3.72 M/UL — LOW (ref 4.4–5.2)
RBC # FLD: 14.9 % — SIGNIFICANT CHANGE UP (ref 11–15.6)
SODIUM SERPL-SCNC: 134 MMOL/L — LOW (ref 135–145)
WBC # BLD: 8.1 K/UL — SIGNIFICANT CHANGE UP (ref 4.8–10.8)
WBC # FLD AUTO: 8.1 K/UL — SIGNIFICANT CHANGE UP (ref 4.8–10.8)

## 2019-05-22 PROCEDURE — 99233 SBSQ HOSP IP/OBS HIGH 50: CPT

## 2019-05-22 RX ORDER — POTASSIUM CHLORIDE 20 MEQ
20 PACKET (EA) ORAL
Refills: 0 | Status: COMPLETED | OUTPATIENT
Start: 2019-05-22 | End: 2019-05-22

## 2019-05-22 RX ORDER — MAGNESIUM SULFATE 500 MG/ML
2 VIAL (ML) INJECTION ONCE
Refills: 0 | Status: COMPLETED | OUTPATIENT
Start: 2019-05-22 | End: 2019-05-22

## 2019-05-22 RX ADMIN — HYDROMORPHONE HYDROCHLORIDE 4 MILLIGRAM(S): 2 INJECTION INTRAMUSCULAR; INTRAVENOUS; SUBCUTANEOUS at 09:09

## 2019-05-22 RX ADMIN — Medication 120 MILLIGRAM(S): at 04:04

## 2019-05-22 RX ADMIN — HYDROMORPHONE HYDROCHLORIDE 1 MILLIGRAM(S): 2 INJECTION INTRAMUSCULAR; INTRAVENOUS; SUBCUTANEOUS at 14:56

## 2019-05-22 RX ADMIN — RIVAROXABAN 20 MILLIGRAM(S): KIT at 04:04

## 2019-05-22 RX ADMIN — Medication 20 MILLIEQUIVALENT(S): at 14:02

## 2019-05-22 RX ADMIN — Medication 50 GRAM(S): at 12:14

## 2019-05-22 RX ADMIN — GABAPENTIN 300 MILLIGRAM(S): 400 CAPSULE ORAL at 21:51

## 2019-05-22 RX ADMIN — SIMVASTATIN 10 MILLIGRAM(S): 20 TABLET, FILM COATED ORAL at 21:51

## 2019-05-22 RX ADMIN — HYDROMORPHONE HYDROCHLORIDE 1 MILLIGRAM(S): 2 INJECTION INTRAMUSCULAR; INTRAVENOUS; SUBCUTANEOUS at 14:22

## 2019-05-22 RX ADMIN — Medication 100 MILLIGRAM(S): at 11:36

## 2019-05-22 RX ADMIN — Medication 20 MILLIGRAM(S): at 04:06

## 2019-05-22 RX ADMIN — HYDROMORPHONE HYDROCHLORIDE 4 MILLIGRAM(S): 2 INJECTION INTRAMUSCULAR; INTRAVENOUS; SUBCUTANEOUS at 16:16

## 2019-05-22 RX ADMIN — GABAPENTIN 300 MILLIGRAM(S): 400 CAPSULE ORAL at 04:04

## 2019-05-22 RX ADMIN — HYDROMORPHONE HYDROCHLORIDE 4 MILLIGRAM(S): 2 INJECTION INTRAMUSCULAR; INTRAVENOUS; SUBCUTANEOUS at 05:05

## 2019-05-22 RX ADMIN — Medication 100 MILLIGRAM(S): at 04:04

## 2019-05-22 RX ADMIN — Medication 40 MILLIGRAM(S): at 04:04

## 2019-05-22 RX ADMIN — HYDROMORPHONE HYDROCHLORIDE 4 MILLIGRAM(S): 2 INJECTION INTRAMUSCULAR; INTRAVENOUS; SUBCUTANEOUS at 12:42

## 2019-05-22 RX ADMIN — HYDROMORPHONE HYDROCHLORIDE 4 MILLIGRAM(S): 2 INJECTION INTRAMUSCULAR; INTRAVENOUS; SUBCUTANEOUS at 08:02

## 2019-05-22 RX ADMIN — HYDROMORPHONE HYDROCHLORIDE 4 MILLIGRAM(S): 2 INJECTION INTRAMUSCULAR; INTRAVENOUS; SUBCUTANEOUS at 16:52

## 2019-05-22 RX ADMIN — Medication 100 MILLIGRAM(S): at 18:20

## 2019-05-22 RX ADMIN — HYDROMORPHONE HYDROCHLORIDE 4 MILLIGRAM(S): 2 INJECTION INTRAMUSCULAR; INTRAVENOUS; SUBCUTANEOUS at 21:00

## 2019-05-22 RX ADMIN — GABAPENTIN 300 MILLIGRAM(S): 400 CAPSULE ORAL at 16:02

## 2019-05-22 RX ADMIN — Medication 20 MILLIEQUIVALENT(S): at 11:39

## 2019-05-22 RX ADMIN — Medication 1 APPLICATION(S): at 11:40

## 2019-05-22 RX ADMIN — HYDROMORPHONE HYDROCHLORIDE 4 MILLIGRAM(S): 2 INJECTION INTRAMUSCULAR; INTRAVENOUS; SUBCUTANEOUS at 12:07

## 2019-05-22 RX ADMIN — HYDROMORPHONE HYDROCHLORIDE 4 MILLIGRAM(S): 2 INJECTION INTRAMUSCULAR; INTRAVENOUS; SUBCUTANEOUS at 04:05

## 2019-05-22 RX ADMIN — Medication 40 MILLIGRAM(S): at 18:20

## 2019-05-22 RX ADMIN — MONTELUKAST 10 MILLIGRAM(S): 4 TABLET, CHEWABLE ORAL at 11:36

## 2019-05-22 RX ADMIN — HYDROMORPHONE HYDROCHLORIDE 4 MILLIGRAM(S): 2 INJECTION INTRAMUSCULAR; INTRAVENOUS; SUBCUTANEOUS at 20:19

## 2019-05-22 NOTE — PROGRESS NOTE ADULT - SUBJECTIVE AND OBJECTIVE BOX
[place] [facility]                                                        Office: [address] [address]                                                       Telephone: [phone]. Fax:[phone]                                                                             PROGRESS NOTE   Reason for follow up: pre-operative cardiovascular risk evaluation and management , diastolic heart failure, peripheral vascular disease                             Overnight: fever    Update:  no new updates.   patient motivated for rehab, physical therapy and also going for short term rehab.    Subjective: "i plan to go to rehab "   Complains of:  c/o pain. left leg pain .  Review of symptoms: Cardiac:  No chest pain. No dyspnea. No palpitations.  Respiratory: no cough. No dyspnea  Gastrointestinal: No diarrhea. No abdominal pain. No bleeding.     Past medical history: No updates.   Chronic conditions:  Hypertension: controlled. CHF: Stable.  	  Vitals:  Vital Signs Last 24 Hrs  T(C): 37.1 (05-23-19 @ 00:12), Max: 37.4 (05-22-19 @ 08:05)  T(F): 98.8 (05-23-19 @ 00:12), Max: 99.3 (05-22-19 @ 08:05)  HR: 90 (05-23-19 @ 00:12) (90 - 109)  BP: 132/75 (05-23-19 @ 00:12) (101/58 - 132/75)  BP(mean): --  RR: 18 (05-23-19 @ 00:12) (18 - 19)  SpO2: 94% (05-23-19 @ 00:12) (92% - 96%)      PHYSICAL EXAM:  Appearance: Comfortable. No acute distress  HEENT:  Head and neck: Atraumatic. Normocephalic.  Normal oral mucosa, PERRL, Neck is supple. No carotid bruit.   Neurologic: A & O x 3, no focal deficits. EOMI , Cranial nerves are intact.  Lymphatic: No cervical lymphadenopathy  Cardiovascular: Normal S1 S2, 3/6 systolic ejection murmur, rubs/gallops.   Respiratory: bilateral basal crepts.   Gastrointestinal:  Soft, Non-tender, + BS  Lower Extremities:  wrapped in ace wrap. wound not examined.   Psychiatry: Patient is calm. No agitation. Mood & affect appropriate  Skin: No rashes/ ecchymoses/cyanosis/ulcers visualized on the face,     CURRENT MEDICATIONS:  MEDICATIONS  (STANDING):  diltiazem    Tablet 120 milliGRAM(s) Oral daily  furosemide    Tablet 40 milliGRAM(s) Oral two times a day  metoprolol succinate  milliGRAM(s) Oral daily    montelukast  dicyclomine  docusate sodium  clindamycin IVPB  acetaminophen  IVPB ..  collagenase Ointment  dextrose 5%.  dextrose 50% Injectable  dextrose 50% Injectable  dextrose 50% Injectable  gabapentin  insulin lispro (HumaLOG) corrective regimen sliding scale  rivaroxaban  simvastatin    PRN: acetaminophen   Tablet .. PRN  dextrose 40% Gel PRN  glucagon  Injectable PRN  HYDROmorphone   Tablet PRN  HYDROmorphone   Tablet PRN  HYDROmorphone  Injectable PRN  ondansetron Injectable PRN  ondansetron Injectable PRN                            11.0   8.1   )-----------( 455      ( 22 May 2019 08:41 )             34.8   N=x    ; L=x        22 May 2019 08:41    134    |  94     |  5.0    ----------------------------<  146    3.4     |  26.0   |  0.54     Ca    8.5        22 May 2019 08:41  Phos  4.0       22 May 2019 08:41  Mg     1.6       22 May 2019 08:41    HgA1c: TSH:     TELEMETRY: Reviewed  no events.   ECG:  Reviewed by me. < from: 12 Lead ECG (05.13.19 @ 09:20) >  Atrial fibrillation with a   	    DIAGNOSTIC TESTING:  [ ] Echocardiogram: LVEF 48%. Severe LAE. moderate aortic stenosis.   [ ] Stress Test:  Stress echo. dobutamine No ischemia. Mild LV dysfunction on resting echo with inotropic contractile reserve.

## 2019-05-22 NOTE — PROGRESS NOTE ADULT - ATTENDING COMMENTS
No further in-patient cardiac work-up/management is needed.  Follow-up in cardiology office in 2 weeks.

## 2019-05-22 NOTE — PROGRESS NOTE ADULT - SUBJECTIVE AND OBJECTIVE BOX
HPI:  69 y/o Female with h/o obesity, HTN, dyslipidemia, Diastolic CHF, A.fib (on coumadin), chronic venous insufficiency, DM type 2, thyroid nodule, ulcerative colitis was admitted for worsening LLE erythema / pain. Patient has a history of severe chronic venous insufficiency with multiple previous admissions for bilateral leg ulcers and cellulitis. Pt is well known to vascular surgery. She previously hospitalized for 3 weeks ago at [facility] for similar symptoms. She was discharged one week ago. She presents with 2 days of increasing left leg swelling, weeping and burning pain to right lower extremity. She denies shortness of breath, chest pain, abdominal pain, nausea, fever or chills. (12 May 2019 21:39)    vascular follow up.  patient reports feeling better. but still reports pain to the left le  Denies fevers or chills    Vital Signs Last 24 Hrs  T(C): 37.4 (22 May 2019 08:05), Max: 37.8 (21 May 2019 18:15)  T(F): 99.3 (22 May 2019 08:05), Max: 100 (21 May 2019 18:15)  HR: 108 (22 May 2019 08:05) (85 - 108)  BP: 101/58 (22 May 2019 08:05) (101/58 - 118/77)  BP(mean): --  RR: 18 (22 May 2019 08:05) (18 - 19)  SpO2: 95% (22 May 2019 08:05) (92% - 97%)                                      11.0   8.1   )-----------( 455      ( 22 May 2019 08:41 )             34.8   05-22    134<L>  |  94<L>  |  5.0<L>  ----------------------------<  146<H>  3.4<L>   |  26.0  |  0.54    Ca    8.5<L>      22 May 2019 08:41  Phos  4.0     05-22  Mg     1.6     05-22    < from: CT Lower Extremity w/ IV Cont, Left (05.21.19 @ 14:47) >  EXAM:  CT LWR EXT IC LT                          PROCEDURE DATE:  05/21/2019          INTERPRETATION:  EXAMINATION: CT of the left lower extremity with contrast    CLINICAL INFORMATION: Left lower extremity pain and fevers    TECHNIQUE: Axial CT images were obtained through the left lower extremity   from the level of the knee through the foot. Coronal and sagittal   reformatted images were made. Images were obtained after the intravenous   administration of 92 mL of Omnipaque 300    FINDINGS:There is marked diffuse skin thickening and subcutaneous fat   infiltration about the imaged portions of the lower extremities   bilaterally, greater on the left, consistent with edema and/or   cellulitis. Please note that the right lower extremity is incompletely   included in the field-of-view. No well-formed fluid collections are   demonstrated to suggest abscess. There are no areas of cortical   destruction or periosteal reaction to suggest osteomyelitis. There are   vascular calcifications. There is advanced left knee osteoarthritis.   There are plantar and dorsal calcaneal enthesophytes.    IMPRESSION: Marked diffuse skin thickening and subcutaneous fat   infiltration about the imaged portions of the lower extremities,   consistent withedema or cellulitis. No well-formed collections to   suggest abscess. No evidence of osteomyelitis.    < end of copied text >      MEDICATIONS  (STANDING):  acetaminophen  IVPB .. 1000 milliGRAM(s) IV Intermittent once  clindamycin IVPB 900 milliGRAM(s) IV Intermittent every 8 hours  collagenase Ointment 1 Application(s) Topical daily  dextrose 5%. 1000 milliLiter(s) (50 mL/Hr) IV Continuous <Continuous>  dextrose 50% Injectable 12.5 Gram(s) IV Push once  dextrose 50% Injectable 25 Gram(s) IV Push once  dextrose 50% Injectable 25 Gram(s) IV Push once  dicyclomine 20 milliGRAM(s) Oral before breakfast  diltiazem    Tablet 120 milliGRAM(s) Oral daily  docusate sodium 100 milliGRAM(s) Oral daily  furosemide    Tablet 40 milliGRAM(s) Oral two times a day  gabapentin 300 milliGRAM(s) Oral three times a day  insulin lispro (HumaLOG) corrective regimen sliding scale   SubCutaneous three times a day before meals  metoprolol succinate  milliGRAM(s) Oral daily  montelukast 10 milliGRAM(s) Oral daily  rivaroxaban 20 milliGRAM(s) Oral every 24 hours  simvastatin 10 milliGRAM(s) Oral at bedtime    MEDICATIONS  (PRN):  acetaminophen   Tablet .. 650 milliGRAM(s) Oral every 6 hours PRN Temp greater or equal to 38C (100.4F), Mild Pain (1 - 3)  dextrose 40% Gel 15 Gram(s) Oral once PRN Blood Glucose LESS THAN 70 milliGRAM(s)/deciliter  glucagon  Injectable 1 milliGRAM(s) IntraMuscular once PRN Glucose LESS THAN 70 milligrams/deciliter  HYDROmorphone   Tablet 2 milliGRAM(s) Oral every 4 hours PRN Moderate Pain (4 - 6)  HYDROmorphone   Tablet 4 milliGRAM(s) Oral every 4 hours PRN Severe Pain (7 - 10)  HYDROmorphone  Injectable 1 milliGRAM(s) IV Push every 3 hours PRN Severe Pain (7 - 10), breakthru  ondansetron Injectable 4 milliGRAM(s) IV Push every 6 hours PRN Nausea and/or Vomiting  ondansetron Injectable 4 milliGRAM(s) IV Push every 6 hours PRN Nausea and/or Vomiting      Physical exam:     No distress  non labored breathing   obese abdomen, soft   dressing changed  left le with multiple superficial ulcerations with ecchymotic/necrotic patches, surrounding blanchable erythema.  patient is extremely sensitive to touch  Foot is warm, no gross motor deficits.  Capillary refill is less than 2 seconds. No cyanosis.     Assessment:  • left le chronic ulceration, pain and fever   - possible coumadin reaction    Plan:   • IV abx restarted   - continue and follow up blood cultures   - ct of the LLe without deep abscess component   -Cont Xarelto  - Local wound care  - PT/OOB-pt considering possible EMANI dispo  - Dispo next 24 hours

## 2019-05-23 ENCOUNTER — TRANSCRIPTION ENCOUNTER (OUTPATIENT)
Age: 69
End: 2019-05-23

## 2019-05-23 VITALS
HEART RATE: 94 BPM | OXYGEN SATURATION: 94 % | DIASTOLIC BLOOD PRESSURE: 65 MMHG | RESPIRATION RATE: 18 BRPM | SYSTOLIC BLOOD PRESSURE: 105 MMHG | TEMPERATURE: 98 F

## 2019-05-23 LAB
HCT VFR BLD CALC: 34.1 % — LOW (ref 37–47)
HGB BLD-MCNC: 10.7 G/DL — LOW (ref 12–16)
MCHC RBC-ENTMCNC: 29.5 PG — SIGNIFICANT CHANGE UP (ref 27–31)
MCHC RBC-ENTMCNC: 31.4 G/DL — LOW (ref 32–36)
MCV RBC AUTO: 93.9 FL — SIGNIFICANT CHANGE UP (ref 81–99)
PLATELET # BLD AUTO: 541 K/UL — HIGH (ref 150–400)
RBC # BLD: 3.63 M/UL — LOW (ref 4.4–5.2)
RBC # FLD: 15 % — SIGNIFICANT CHANGE UP (ref 11–15.6)
WBC # BLD: 7.6 K/UL — SIGNIFICANT CHANGE UP (ref 4.8–10.8)
WBC # FLD AUTO: 7.6 K/UL — SIGNIFICANT CHANGE UP (ref 4.8–10.8)

## 2019-05-23 PROCEDURE — 80053 COMPREHEN METABOLIC PANEL: CPT

## 2019-05-23 PROCEDURE — C1725: CPT

## 2019-05-23 PROCEDURE — 97116 GAIT TRAINING THERAPY: CPT

## 2019-05-23 PROCEDURE — 86901 BLOOD TYPING SEROLOGIC RH(D): CPT

## 2019-05-23 PROCEDURE — 86140 C-REACTIVE PROTEIN: CPT

## 2019-05-23 PROCEDURE — 85027 COMPLETE CBC AUTOMATED: CPT

## 2019-05-23 PROCEDURE — 97163 PT EVAL HIGH COMPLEX 45 MIN: CPT

## 2019-05-23 PROCEDURE — 75710 ARTERY X-RAYS ARM/LEG: CPT | Mod: XU

## 2019-05-23 PROCEDURE — 80048 BASIC METABOLIC PNL TOTAL CA: CPT

## 2019-05-23 PROCEDURE — 99152 MOD SED SAME PHYS/QHP 5/>YRS: CPT

## 2019-05-23 PROCEDURE — 82962 GLUCOSE BLOOD TEST: CPT

## 2019-05-23 PROCEDURE — 37228: CPT

## 2019-05-23 PROCEDURE — 93306 TTE W/DOPPLER COMPLETE: CPT

## 2019-05-23 PROCEDURE — 93925 LOWER EXTREMITY STUDY: CPT

## 2019-05-23 PROCEDURE — 93351 STRESS TTE COMPLETE: CPT

## 2019-05-23 PROCEDURE — 97530 THERAPEUTIC ACTIVITIES: CPT

## 2019-05-23 PROCEDURE — 36415 COLL VENOUS BLD VENIPUNCTURE: CPT

## 2019-05-23 PROCEDURE — 93005 ELECTROCARDIOGRAM TRACING: CPT

## 2019-05-23 PROCEDURE — 85610 PROTHROMBIN TIME: CPT

## 2019-05-23 PROCEDURE — 87040 BLOOD CULTURE FOR BACTERIA: CPT

## 2019-05-23 PROCEDURE — 99285 EMERGENCY DEPT VISIT HI MDM: CPT

## 2019-05-23 PROCEDURE — 84100 ASSAY OF PHOSPHORUS: CPT

## 2019-05-23 PROCEDURE — C1760: CPT

## 2019-05-23 PROCEDURE — 83735 ASSAY OF MAGNESIUM: CPT

## 2019-05-23 PROCEDURE — C1769: CPT

## 2019-05-23 PROCEDURE — 85730 THROMBOPLASTIN TIME PARTIAL: CPT

## 2019-05-23 PROCEDURE — 99153 MOD SED SAME PHYS/QHP EA: CPT

## 2019-05-23 PROCEDURE — 86850 RBC ANTIBODY SCREEN: CPT

## 2019-05-23 PROCEDURE — C1894: CPT

## 2019-05-23 PROCEDURE — 73701 CT LOWER EXTREMITY W/DYE: CPT

## 2019-05-23 PROCEDURE — 80202 ASSAY OF VANCOMYCIN: CPT

## 2019-05-23 PROCEDURE — 86900 BLOOD TYPING SEROLOGIC ABO: CPT

## 2019-05-23 PROCEDURE — 85652 RBC SED RATE AUTOMATED: CPT

## 2019-05-23 PROCEDURE — 93970 EXTREMITY STUDY: CPT

## 2019-05-23 PROCEDURE — 71045 X-RAY EXAM CHEST 1 VIEW: CPT

## 2019-05-23 PROCEDURE — 99232 SBSQ HOSP IP/OBS MODERATE 35: CPT

## 2019-05-23 PROCEDURE — C1887: CPT

## 2019-05-23 RX ORDER — DOCUSATE SODIUM 100 MG
1 CAPSULE ORAL
Qty: 0 | Refills: 0 | DISCHARGE
Start: 2019-05-23

## 2019-05-23 RX ORDER — DILTIAZEM HCL 120 MG
1 CAPSULE, EXT RELEASE 24 HR ORAL
Qty: 0 | Refills: 0 | DISCHARGE
Start: 2019-05-23

## 2019-05-23 RX ORDER — COLLAGENASE CLOSTRIDIUM HIST. 250 UNIT/G
1 OINTMENT (GRAM) TOPICAL
Qty: 0 | Refills: 0 | DISCHARGE
Start: 2019-05-23

## 2019-05-23 RX ORDER — RIVAROXABAN 15 MG-20MG
1 KIT ORAL
Qty: 0 | Refills: 0 | DISCHARGE
Start: 2019-05-23

## 2019-05-23 RX ORDER — GABAPENTIN 400 MG/1
1 CAPSULE ORAL
Qty: 0 | Refills: 0 | DISCHARGE
Start: 2019-05-23

## 2019-05-23 RX ORDER — HYDROMORPHONE HYDROCHLORIDE 2 MG/ML
1 INJECTION INTRAMUSCULAR; INTRAVENOUS; SUBCUTANEOUS
Qty: 0 | Refills: 0 | DISCHARGE
Start: 2019-05-23

## 2019-05-23 RX ORDER — MAGNESIUM OXIDE 400 MG ORAL TABLET 241.3 MG
400 TABLET ORAL
Refills: 0 | Status: DISCONTINUED | OUTPATIENT
Start: 2019-05-23 | End: 2019-05-23

## 2019-05-23 RX ADMIN — GABAPENTIN 300 MILLIGRAM(S): 400 CAPSULE ORAL at 05:25

## 2019-05-23 RX ADMIN — Medication 100 MILLIGRAM(S): at 11:34

## 2019-05-23 RX ADMIN — HYDROMORPHONE HYDROCHLORIDE 4 MILLIGRAM(S): 2 INJECTION INTRAMUSCULAR; INTRAVENOUS; SUBCUTANEOUS at 02:30

## 2019-05-23 RX ADMIN — Medication 100 MILLIGRAM(S): at 05:25

## 2019-05-23 RX ADMIN — HYDROMORPHONE HYDROCHLORIDE 4 MILLIGRAM(S): 2 INJECTION INTRAMUSCULAR; INTRAVENOUS; SUBCUTANEOUS at 01:55

## 2019-05-23 RX ADMIN — HYDROMORPHONE HYDROCHLORIDE 1 MILLIGRAM(S): 2 INJECTION INTRAMUSCULAR; INTRAVENOUS; SUBCUTANEOUS at 10:44

## 2019-05-23 RX ADMIN — Medication 100 MILLIGRAM(S): at 01:52

## 2019-05-23 RX ADMIN — RIVAROXABAN 20 MILLIGRAM(S): KIT at 05:25

## 2019-05-23 RX ADMIN — MONTELUKAST 10 MILLIGRAM(S): 4 TABLET, CHEWABLE ORAL at 11:34

## 2019-05-23 RX ADMIN — Medication 1 APPLICATION(S): at 11:35

## 2019-05-23 RX ADMIN — Medication 20 MILLIGRAM(S): at 05:26

## 2019-05-23 RX ADMIN — Medication 120 MILLIGRAM(S): at 05:25

## 2019-05-23 RX ADMIN — HYDROMORPHONE HYDROCHLORIDE 1 MILLIGRAM(S): 2 INJECTION INTRAMUSCULAR; INTRAVENOUS; SUBCUTANEOUS at 11:19

## 2019-05-23 RX ADMIN — MAGNESIUM OXIDE 400 MG ORAL TABLET 400 MILLIGRAM(S): 241.3 TABLET ORAL at 10:48

## 2019-05-23 RX ADMIN — Medication 400 MILLIGRAM(S): at 11:34

## 2019-05-23 RX ADMIN — Medication 2: at 11:34

## 2019-05-23 RX ADMIN — Medication 100 MILLIGRAM(S): at 10:43

## 2019-05-23 RX ADMIN — Medication 40 MILLIGRAM(S): at 05:25

## 2019-05-23 NOTE — DISCHARGE NOTE PROVIDER - NSDCCPTREATMENT_GEN_ALL_CORE_FT
PRINCIPAL PROCEDURE  Procedure: Angioplasty of anterior tibial artery  Findings and Treatment:       SECONDARY PROCEDURE  Procedure: Wound debridement  Findings and Treatment:

## 2019-05-23 NOTE — DISCHARGE NOTE PROVIDER - NSDCFUADDINST_GEN_ALL_CORE_FT
May shower daily. Remove dressing prior. Wash incision with soap and water and pat dry.  Collagenase to RLE cover with gauze and kerlex and wrap with ACE for gentle compression daily. Monitor wound for any redness, swelling or drainage and call office immediately with any concerns

## 2019-05-23 NOTE — DISCHARGE NOTE PROVIDER - NSDCCPCAREPLAN_GEN_ALL_CORE_FT
PRINCIPAL DISCHARGE DIAGNOSIS  Diagnosis: PAD (peripheral artery disease)  Assessment and Plan of Treatment:       SECONDARY DISCHARGE DIAGNOSES  Diagnosis: DM (diabetes mellitus)  Assessment and Plan of Treatment:     Diagnosis: Afib  Assessment and Plan of Treatment:     Diagnosis: Nonhealing nonsurgical wound  Assessment and Plan of Treatment:     Diagnosis: Cellulitis of left lower extremity  Assessment and Plan of Treatment:

## 2019-05-23 NOTE — PROGRESS NOTE ADULT - SUBJECTIVE AND OBJECTIVE BOX
[facility]                                                        Office: [address] [address]                                                       Telephone: [phone]. Fax:[phone]                                                                             PROGRESS NOTE   Reason for follow up: pre-operative cardiovascular risk evaluation and management , diastolic heart failure, peripheral vascular disease                             Overnight: fever    Update:  constipated.  did not have a bowel movement for > 1 week. as per patient.        Subjective: "i have some abdominal discomfort. "   Complains of:  c/o pain. left leg pain . constipation.   Review of symptoms: Cardiac:  No chest pain. No dyspnea. No palpitations.  Respiratory: no cough. No dyspnea  Gastrointestinal: No diarrhea. No abdominal pain. No bleeding.     Past medical history: No updates.   Chronic conditions:  Hypertension: controlled. CHF: Stable.  	  Vitals:  Vital Signs Last 24 Hrs  T(C): 36.8 (05-23-19 @ 13:01), Max: 37.2 (05-23-19 @ 08:28)  T(F): 98.2 (05-23-19 @ 13:01), Max: 99 (05-23-19 @ 08:28)  HR: 94 (05-23-19 @ 13:01) (90 - 101)  BP: 105/65 (05-23-19 @ 13:01) (105/65 - 134/77)  BP(mean): --  RR: 18 (05-23-19 @ 13:01) (18 - 18)  SpO2: 94% (05-23-19 @ 13:01) (94% - 94%)    PHYSICAL EXAM:  Appearance: Comfortable. No acute distress  HEENT:  Head and neck: Atraumatic. Normocephalic.  Normal oral mucosa, PERRL, Neck is supple. No carotid bruit.   Neurologic: A & O x 3, no focal deficits. EOMI , Cranial nerves are intact.  Lymphatic: No cervical lymphadenopathy  Cardiovascular: Normal S1 S2, 3/6 systolic ejection murmur, rubs/gallops.   Respiratory: bilateral basal crepts.   Gastrointestinal:  Soft, Non-tender, + BS  Lower Extremities:  wrapped in ace wrap. wound not examined.   Psychiatry: Patient is calm. No agitation. Mood & affect appropriate  Skin: No rashes/ ecchymoses/cyanosis/ulcers visualized on the face,     CURRENT MEDICATIONS:    MEDICATIONS  (STANDING):  diltiazem    Tablet 120 milliGRAM(s) Oral daily  furosemide    Tablet 40 milliGRAM(s) Oral two times a day  metoprolol succinate  milliGRAM(s) Oral daily    montelukast  dicyclomine  docusate sodium  clindamycin IVPB  collagenase Ointment  dextrose 5%.  dextrose 50% Injectable  dextrose 50% Injectable  dextrose 50% Injectable  gabapentin  insulin lispro (HumaLOG) corrective regimen sliding scale  magnesium oxide  rivaroxaban  simvastatin    PRN: acetaminophen   Tablet .. PRN  dextrose 40% Gel PRN  glucagon  Injectable PRN  HYDROmorphone   Tablet PRN  HYDROmorphone   Tablet PRN  HYDROmorphone  Injectable PRN  ondansetron Injectable PRN  ondansetron Injectable PRN                                    10.7   7.6   )-----------( 541      ( 23 May 2019 08:33 )             34.1   N=x    ; L=x        22 May 2019 08:41    134    |  94     |  5.0    ----------------------------<  146    3.4     |  26.0   |  0.54     Ca    8.5        22 May 2019 08:41  Phos  4.0       22 May 2019 08:41  Mg     1.6       22 May 2019 08:41      HgA1c: TSH:     TELEMETRY: Reviewed  no events.   ECG:  Reviewed by me. < from: 12 Lead ECG (05.13.19 @ 09:20) >  Atrial fibrillation with a   	    DIAGNOSTIC TESTING:  [ ] Echocardiogram: LVEF 48%. Severe LAE. moderate aortic stenosis.   [ ] Stress Test:  Stress echo. dobutamine No ischemia. Mild LV dysfunction on resting echo with inotropic contractile reserve.

## 2019-05-23 NOTE — PROGRESS NOTE ADULT - ASSESSMENT
A/P: 67 y/o F with PMHx of HFpEF (EF 55-60%), Afib (on coumadin), HTN, HLD, chronic venous insufficiency with dermatitis/cellulitis, DMII, Ulcerative colitis, and thyroid nodule who was admitted for LLE erythema and pain. Patient follows with Dr. Justo Mayer and had her last Nuclear stress test was 2016 which was normal. Patient has had multiple hospital admissions for bilateral leg ulcers and cellulitis despite antibiotic and diuretic treatment. Patient states that for 2 days prior to admission she was having increasing leg swelling with weeping and burning. Patient was admitted at this time for IV antibiotics, and scheduled for LE angiogram and potential surgical debridement. Patient states at home she walks with a cane or walker, and has had some short episodes of chest tightness, non-exertional, resolves after a few minutes. Pt states last episode of chest tightness was a few weeks ago, and also has chronic orthopnea.  Patient denies fevers, chills, palpitations, syncope, near syncope, abdominal pain, N/V/D, headache, or dizziness.     1. Radha-Operative Cardiac Risk Stratification  tolerated procedure well. no cardiac complication.     2. Afib  - Currently rate controlled  -on Toprol XL 100mg qday and Diltiazem 120gm PO qday. eliquis 5 mg Q12 if insurance approves and the co pay is affordable by patient.       3. HTN  - BP well controlled at this time   - Continue current regimen     4) congestive heart failure : MOderate aortic stenosis. LVEF 48%. diastolic heart failure. ct PO diuretics. Euvolemic.     5) Low magnesium adn potassium. repletion.     Deep venous thrombosis prophylaxis: on anticoagulation due to atrial fibrillation .
A/P: 67 y/o F with PMHx of HFpEF (EF 55-60%), Afib (on coumadin), HTN, HLD, chronic venous insufficiency with dermatitis/cellulitis, DMII, Ulcerative colitis, and thyroid nodule who was admitted for LLE erythema and pain. Patient follows with Dr. Jutso Mayer and had her last Nuclear stress test was 2016 which was normal. Patient has had multiple hospital admissions for bilateral leg ulcers and cellulitis despite antibiotic and diuretic treatment. Patient states that for 2 days prior to admission she was having increasing leg swelling with weeping and burning. Patient was admitted at this time for IV antibiotics, and scheduled for LE angiogram and potential surgical debridement. Patient states at home she walks with a cane or walker, and has had some short episodes of chest tightness, non-exertional, resolves after a few minutes. Pt states last episode of chest tightness was a few weeks ago, and also has chronic orthopnea.  Patient denies fevers, chills, palpitations, syncope, near syncope, abdominal pain, N/V/D, headache, or dizziness.     1. Radha-Operative Cardiac Risk Stratification  - no ischemia on stress test. Diastolic dysfunction. ct diuretics. Optimized.  No further cardiac work up is needed. Proceed for procedure once INR acceptable for the vascular proceduralist.   For afib, patient does not   2. Afib  - Currently rate controlled  -on Toprol XL 100mg qday and Diltiazem 120gm PO qday. patient was on these meds as outpatient will ct current meds for now   - Coumadin on hold, INR > 2. NO heparin drip. Does not need bridging.   d/c heparin drip.  High risk of bleeidng on INR > 2 and Heparin drip.    3. HTN  - BP well controlled at this time   - Continue current regimen     4) congestive heart failure : echo pending. PO lasix. replete potassium and Magnesium.     I spoke to dr mayer and gave him an update on the patient.
67yo F with LLE chronic wound.  -daily dressing changes  -On eliquis,  needs to f/u with insurance approval on Monday  -OOB and ambulate with PT  -pain management.
69yo F s/p LLE AT angioplasty who tolerated well the procedure. LLE dressing was changed.     -Heparin drip running; Patient will need Eliquis for home per Dr. Yoo. Will need to speak to social work regarding insurance approval  -Jannet MARTINEZN  -Patient must be encouraged to walk with PT today  -Plavix loaded post op and daily  -start eliquis 5/18  -possible D/C home w VNS on 5/18 after PT works with her and walks with her, IF eliquis can be approved by her insurance
69yo F s/p LLE AT angioplasty who tolerated well the procedure. LLE dressing was changed.     -Start heparin drip at 3pm  -f/u BMP at 5pm  -Plavix load post op and daily  -start eliquis 5/18  -possible D/C home w VNS on 5/18 after PT works with her.
A/P: 67 y/o F with PMHx of HFpEF (EF 55-60%), Afib (on coumadin), HTN, HLD, chronic venous insufficiency with dermatitis/cellulitis, DMII, Ulcerative colitis, and thyroid nodule who was admitted for LLE erythema and pain. Patient follows with Dr. Justo Mayer and had her last Nuclear stress test was 2016 which was normal. Patient has had multiple hospital admissions for bilateral leg ulcers and cellulitis despite antibiotic and diuretic treatment. Patient states that for 2 days prior to admission she was having increasing leg swelling with weeping and burning. Patient was admitted at this time for IV antibiotics, and scheduled for LE angiogram and potential surgical debridement. Patient states at home she walks with a cane or walker, and has had some short episodes of chest tightness, non-exertional, resolves after a few minutes. Pt states last episode of chest tightness was a few weeks ago, and also has chronic orthopnea.  Patient denies fevers, chills, palpitations, syncope, near syncope, abdominal pain, N/V/D, headache, or dizziness.     1. Radha-Operative Cardiac Risk Stratification  - no ischemia on stress test. Diastolic dysfunction. ct diuretics. Optimized.  No further cardiac work up is needed. Proceed for procedure once INR acceptable for the vascular proceduralist.   For afib, patient does not   2. Afib  - Currently rate controlled  -on Toprol XL 100mg qday and Diltiazem 120gm PO qday. patient was on these meds as outpatient will ct current meds for now   - Coumadin on hold, INR > 2. NO heparin drip. Does not need bridging.   d/c heparin drip.  High risk of bleeidng on INR > 2 and Heparin drip.   daily INR checks.     3. HTN  - BP well controlled at this time   - Continue current regimen     4) congestive heart failure : MOderate aortic stenosis. LVEF 48%. diastolic heart failure. ct PO diuretics. Euvolemic.
A/P: 67 y/o F with PMHx of HFpEF (EF 55-60%), Afib (on coumadin), HTN, HLD, chronic venous insufficiency with dermatitis/cellulitis, DMII, Ulcerative colitis, and thyroid nodule who was admitted for LLE erythema and pain. Patient follows with Dr. Justo Mayer and had her last Nuclear stress test was 2016 which was normal. Patient has had multiple hospital admissions for bilateral leg ulcers and cellulitis despite antibiotic and diuretic treatment. Patient states that for 2 days prior to admission she was having increasing leg swelling with weeping and burning. Patient was admitted at this time for IV antibiotics, and scheduled for LE angiogram and potential surgical debridement. Patient states at home she walks with a cane or walker, and has had some short episodes of chest tightness, non-exertional, resolves after a few minutes. Pt states last episode of chest tightness was a few weeks ago, and also has chronic orthopnea.  Patient denies fevers, chills, palpitations, syncope, near syncope, abdominal pain, N/V/D, headache, or dizziness.     1. Radha-Operative Cardiac Risk Stratification  tolerated procedure well. no cardiac complication.     2. Afib  - Currently rate controlled  -on Toprol XL 100mg qday and Diltiazem 120gm PO qday. eliquis 5 mg Q12 if insurance approves and the co pay is affordable by patient.       3. HTN  - BP well controlled at this time   - Continue current regimen     4) congestive heart failure : MOderate aortic stenosis. LVEF 48%. diastolic heart failure. ct PO diuretics. Euvolemic.
A/P: 67 y/o F with PMHx of HFpEF (EF 55-60%), Afib (on coumadin), HTN, HLD, chronic venous insufficiency with dermatitis/cellulitis, DMII, Ulcerative colitis, and thyroid nodule who was admitted for LLE erythema and pain. Patient follows with Dr. Justo Mayer and had her last Nuclear stress test was 2016 which was normal. Patient has had multiple hospital admissions for bilateral leg ulcers and cellulitis despite antibiotic and diuretic treatment. Patient states that for 2 days prior to admission she was having increasing leg swelling with weeping and burning. Patient was admitted at this time for IV antibiotics, and scheduled for LE angiogram and potential surgical debridement. Patient states at home she walks with a cane or walker, and has had some short episodes of chest tightness, non-exertional, resolves after a few minutes. Pt states last episode of chest tightness was a few weeks ago, and also has chronic orthopnea.  Patient denies fevers, chills, palpitations, syncope, near syncope, abdominal pain, N/V/D, headache, or dizziness.     1. Radha-Operative Cardiac Risk Stratification  tolerated procedure well. no cardiac complication.     2. Afib  - Currently rate controlled  -on Toprol XL 100mg qday and Diltiazem 120gm PO qday. eliquis 5 mg Q12 if insurance approves and the co pay is affordable by patient.   replete magnesium.     3. HTN  - BP well controlled at this time   - Continue current regimen     4) congestive heart failure : MOderate aortic stenosis. LVEF 48%. diastolic heart failure. ct PO diuretics. Euvolemic.     5) Low magnesium adn potassium. repletion.     Deep venous thrombosis prophylaxis: on anticoagulation due to atrial fibrillation .  No further in-patient cardiac work-up/management is needed.  Follow-up in cardiology office in 2 weeks.   follow up with her primary cardiologist at [facility].
A/P: 69 y/o F with PMHx of HFpEF (EF 55-60%), Afib (on coumadin), HTN, HLD, chronic venous insufficiency with dermatitis/cellulitis, DMII, Ulcerative colitis, and thyroid nodule who was admitted for LLE erythema and pain. Patient follows with Dr. Justo Mayer and had her last Nuclear stress test was 2016 which was normal. Patient has had multiple hospital admissions for bilateral leg ulcers and cellulitis despite antibiotic and diuretic treatment. Patient states that for 2 days prior to admission she was having increasing leg swelling with weeping and burning. Patient was admitted at this time for IV antibiotics, and scheduled for LE angiogram and potential surgical debridement. Patient states at home she walks with a cane or walker, and has had some short episodes of chest tightness, non-exertional, resolves after a few minutes. Pt states last episode of chest tightness was a few weeks ago, and also has chronic orthopnea.  Patient denies fevers, chills, palpitations, syncope, near syncope, abdominal pain, N/V/D, headache, or dizziness.     1. Radha-Operative Cardiac Risk Stratification  - no ischemia on stress test. Diastolic dysfunction. ct diuretics. Optimized.  tolerated procedure well. no cardiac complication.   2. Afib  - Currently rate controlled  -on Toprol XL 100mg qday and Diltiazem 120gm PO qday. patient was on these meds as outpatient will ct current meds for now   Does not need bridging.  eliquis 5 mg Q12 if insurance approves and the co pay is affordable by patient.       3. HTN  - BP well controlled at this time   - Continue current regimen     4) congestive heart failure : MOderate aortic stenosis. LVEF 48%. diastolic heart failure. ct PO diuretics. Euvolemic.
A/P: 69 y/o F with PMHx of HFpEF (EF 55-60%), Afib (on coumadin), HTN, HLD, chronic venous insufficiency with dermatitis/cellulitis, DMII, Ulcerative colitis, and thyroid nodule who was admitted for LLE erythema and pain. Patient follows with Dr. Justo Mayer and had her last Nuclear stress test was 2016 which was normal. Patient has had multiple hospital admissions for bilateral leg ulcers and cellulitis despite antibiotic and diuretic treatment. Patient states that for 2 days prior to admission she was having increasing leg swelling with weeping and burning. Patient was admitted at this time for IV antibiotics, and scheduled for LE angiogram and potential surgical debridement. Patient states at home she walks with a cane or walker, and has had some short episodes of chest tightness, non-exertional, resolves after a few minutes. Pt states last episode of chest tightness was a few weeks ago, and also has chronic orthopnea.  Patient denies fevers, chills, palpitations, syncope, near syncope, abdominal pain, N/V/D, headache, or dizziness.     1. Radha-Operative Cardiac Risk Stratification  tolerated procedure well. no cardiac complication.     2. Afib  - Currently rate controlled  -on Toprol XL 100mg qday and Diltiazem 120gm PO qday. patient was on these meds as outpatient will ct current meds for now   Does not need bridging.  eliquis 5 mg Q12 if insurance approves and the co pay is affordable by patient.       3. HTN  - BP well controlled at this time   - Continue current regimen     4) congestive heart failure : MOderate aortic stenosis. LVEF 48%. diastolic heart failure. ct PO diuretics. Euvolemic.

## 2019-05-23 NOTE — PROGRESS NOTE ADULT - PROVIDER SPECIALTY LIST ADULT
Anesthesia
Cardiology
Vascular Surgery
Cardiology
Cardiology

## 2019-05-23 NOTE — DISCHARGE NOTE PROVIDER - HOSPITAL COURSE
67 y/o Female with h/o obesity, HTN, dyslipidemia, Diastolic CHF, A.fib (on coumadin), chronic venous insufficiency, DM type 2, thyroid nodule, ulcerative colitis was admitted for worsening LLE erythema / pain. Patient has a history of severe chronic venous insufficiency with multiple previous admissions for bilateral leg ulcers and cellulitis. Pt is well known to vascular surgery. She previously hospitalized for 3 weeks ago at [facility] for similar symptoms. She was discharged one week ago. She presents with 2 days of increasing left leg swelling, weeping and burning pain to right lower extremity. Admit to vascular surgery under Dr. Carlson. Started on IV abx and Heparin gtt. Cardiology consulted for risk stratification as pt scheduled for debridement of LE wound on this admission. Cardiology cleared pt and she was taken to the OR on 5/16/19 for LLE wound debridement and washout. He tolerated the procedure well and POD #1 pt was taken to cath lab by Dr Jemma Yoo and underwent LLE angiogram with angioplasty of AT. Her Heparin was transitioned to Xarelto as there was concern her LE wounds may be sec to Coumadin reaction and therefore Coumadin was deferred. She had a fever on POD#2 and CXR was clear, there was no Leukocytosis however her antibiotics were restarted. A CT scan of LLE was performed to R/O and deep tissue collection and this was ruled out. Her pain was controlled with prescribed narcotics and she was seen by PT who recommended EMANI. After discussion with pt and family, pt agrees to EMANI. Pt is stable for DC

## 2019-05-23 NOTE — PROGRESS NOTE ADULT - REASON FOR ADMISSION
chronic statis dermatitis and lipodermatosclerosis

## 2019-05-23 NOTE — PROGRESS NOTE ADULT - SUBJECTIVE AND OBJECTIVE BOX
HPI:  67 y/o Female with h/o obesity, HTN, dyslipidemia, Diastolic CHF, A.fib (on coumadin), chronic venous insufficiency, DM type 2, thyroid nodule, ulcerative colitis was admitted for worsening LLE erythema / pain. Patient has a history of severe chronic venous insufficiency with multiple previous admissions for bilateral leg ulcers and cellulitis. Pt is well known to vascular surgery. She previously hospitalized for 3 weeks ago at [facility] for similar symptoms. She was discharged one week ago. She presents with 2 days of increasing left leg swelling, weeping and burning pain to right lower extremity. She denies shortness of breath, chest pain, abdominal pain, nausea, fever or chills. (12 May 2019 21:39)    vascular follow up.  patient reports feeling better. but still reports pain to the left le. As discussed with her  she is willing to go to rehab for dispo  Denies fevers or chills    Vital Signs Last 24 Hrs  T(C): 37.2 (23 May 2019 08:28), Max: 37.2 (23 May 2019 08:28)  T(F): 99 (23 May 2019 08:28), Max: 99 (23 May 2019 08:28)  HR: 100 (23 May 2019 08:28) (90 - 109)  BP: 134/77 (23 May 2019 08:28) (106/66 - 134/77)  BP(mean): --  RR: 18 (23 May 2019 08:28) (18 - 18)  SpO2: 94% (23 May 2019 08:28) (92% - 96%)                                     10.7   7.6   )-----------( 541      ( 23 May 2019 08:33 )             34.1   05-22    134<L>  |  94<L>  |  5.0<L>  ----------------------------<  146<H>  3.4<L>   |  26.0  |  0.54    Ca    8.5<L>      22 May 2019 08:41  Phos  4.0     05-22  Mg     1.6     05-22    CAPILLARY BLOOD GLUCOSE  POCT Blood Glucose.: 131 mg/dL (23 May 2019 07:58)  POCT Blood Glucose.: 132 mg/dL (22 May 2019 22:06)  POCT Blood Glucose.: 135 mg/dL (22 May 2019 16:54)  POCT Blood Glucose.: 136 mg/dL (22 May 2019 11:34)    < from: CT Lower Extremity w/ IV Cont, Left (05.21.19 @ 14:47) >  EXAM:  CT LWR EXT IC LT                          PROCEDURE DATE:  05/21/2019          INTERPRETATION:  EXAMINATION: CT of the left lower extremity with contrast    CLINICAL INFORMATION: Left lower extremity pain and fevers    TECHNIQUE: Axial CT images were obtained through the left lower extremity   from the level of the knee through the foot. Coronal and sagittal   reformatted images were made. Images were obtained after the intravenous   administration of 92 mL of Omnipaque 300    FINDINGS:There is marked diffuse skin thickening and subcutaneous fat   infiltration about the imaged portions of the lower extremities   bilaterally, greater on the left, consistent with edema and/or   cellulitis. Please note that the right lower extremity is incompletely   included in the field-of-view. No well-formed fluid collections are   demonstrated to suggest abscess. There are no areas of cortical   destruction or periosteal reaction to suggest osteomyelitis. There are   vascular calcifications. There is advanced left knee osteoarthritis.   There are plantar and dorsal calcaneal enthesophytes.    IMPRESSION: Marked diffuse skin thickening and subcutaneous fat   infiltration about the imaged portions of the lower extremities,   consistent withedema or cellulitis. No well-formed collections to   suggest abscess. No evidence of osteomyelitis.    < end of copied text >    MEDICATIONS  (STANDING):  acetaminophen  IVPB .. 1000 milliGRAM(s) IV Intermittent once  clindamycin IVPB 900 milliGRAM(s) IV Intermittent every 8 hours  collagenase Ointment 1 Application(s) Topical daily  dextrose 5%. 1000 milliLiter(s) (50 mL/Hr) IV Continuous <Continuous>  dextrose 50% Injectable 12.5 Gram(s) IV Push once  dextrose 50% Injectable 25 Gram(s) IV Push once  dextrose 50% Injectable 25 Gram(s) IV Push once  dicyclomine 20 milliGRAM(s) Oral before breakfast  diltiazem    Tablet 120 milliGRAM(s) Oral daily  docusate sodium 100 milliGRAM(s) Oral daily  furosemide    Tablet 40 milliGRAM(s) Oral two times a day  gabapentin 300 milliGRAM(s) Oral three times a day  insulin lispro (HumaLOG) corrective regimen sliding scale   SubCutaneous three times a day before meals  magnesium oxide 400 milliGRAM(s) Oral two times a day with meals  metoprolol succinate  milliGRAM(s) Oral daily  montelukast 10 milliGRAM(s) Oral daily  rivaroxaban 20 milliGRAM(s) Oral every 24 hours  simvastatin 10 milliGRAM(s) Oral at bedtime    MEDICATIONS  (PRN):  acetaminophen   Tablet .. 650 milliGRAM(s) Oral every 6 hours PRN Temp greater or equal to 38C (100.4F), Mild Pain (1 - 3)  dextrose 40% Gel 15 Gram(s) Oral once PRN Blood Glucose LESS THAN 70 milliGRAM(s)/deciliter  glucagon  Injectable 1 milliGRAM(s) IntraMuscular once PRN Glucose LESS THAN 70 milligrams/deciliter  HYDROmorphone   Tablet 2 milliGRAM(s) Oral every 4 hours PRN Moderate Pain (4 - 6)  HYDROmorphone   Tablet 4 milliGRAM(s) Oral every 4 hours PRN Severe Pain (7 - 10)  HYDROmorphone  Injectable 1 milliGRAM(s) IV Push every 3 hours PRN Severe Pain (7 - 10), breakthru  ondansetron Injectable 4 milliGRAM(s) IV Push every 6 hours PRN Nausea and/or Vomiting  ondansetron Injectable 4 milliGRAM(s) IV Push every 6 hours PRN Nausea and/or Vomiting      Physical exam:     No distress  non labored breathing   obese abdomen, soft   L Foot is warm, no gross motor deficits. LLE dressing CDI  Capillary refill is less than 2 seconds. No cyanosis.     Assessment:  • left le chronic ulceration, pain and fever   - possible coumadin reaction    Plan:   • cont abx. Will switch to po upon dc  - continue and follow up blood cultures   - ct of the LLe without deep abscess component   -Cont Xarelto  - Local wound care  - EMANI dispo. Pt is stable for dispo

## 2019-05-23 NOTE — DISCHARGE NOTE PROVIDER - CARE PROVIDER_API CALL
Denis Breaux (MD)  Surgery; Vascular Surgery  [address], [address]st [place] [address]  Phone: [phone]  Fax: [phone]  Follow Up Time: 2 weeks

## 2019-05-25 LAB
CULTURE RESULTS: SIGNIFICANT CHANGE UP
SPECIMEN SOURCE: SIGNIFICANT CHANGE UP

## 2019-05-28 NOTE — HISTORY OF PRESENT ILLNESS
[de-identified] : 69 y/o female RTC for follow up, now using Santyl, Xeroform and ace twice/week, feels and looks better. Wound is smaller an granulating. She has been wearing an Unna Boot since before coming to our office in October 2018. [FreeTextEntry1] : The patient is a 67 y/o female with pain and swelling to BLE for 3 months. There is an ulcer to the back of her right calf. There is no weeping of drainage of wound or legs. She has been using Santyl and Silvadene for wound healing. She was previously seen by Dr Rivera who debrided the wound and applied unna boots with limited improvement. Comorbidities include DM2, HTN and A Fib. She takes ASA 81 mg, Coumadin and Pravastatin 10 mg QD

## 2019-05-28 NOTE — ASSESSMENT
[FreeTextEntry1] : 67 y/o woman with lymphedema and stasis ulceration, with 2 wounds that are new in the dorsum of foot in addition to posterior calf. Overall today continues to improve. Cleansed wounds with soap and water and redressed with Xeroform and ace bandage. Granulating nicely and significantly smaller in size. Almost healed.\par \par Today the lower leg seems improving, will continue Santyl. Patient going on a cruise, recommended wound care as tolerated, will follow when she returns.\par \par -ACE bandage for compression\par -Ambulation elevation\par -PO antibiotics\par -RTC next 2-3 weeks

## 2019-05-28 NOTE — PHYSICAL EXAM
[2+] : left 2+ [Ankle Swelling (On Exam)] : present [1+] : left 1+ [Ankle Swelling Bilaterally] : bilaterally  [Ankle Swelling On The Left] : moderate [] : present [Oriented to Place] : oriented to place [Oriented to Person] : oriented to person [Alert] : alert [Oriented to Time] : oriented to time [Calm] : calm [de-identified] : WD, WN, NAD. Awake, alert, interactive. Ambulates slowly with a cane for support. [de-identified] : supple [de-identified] : LETICIA, PERSHASHAL [de-identified] : There is swelling and considerable ecchymosis to dorsal right foot. Pain with slight toe movements. [de-identified] : WWP. Ulcer to right posterior calf 8 cm (l) x 1 cm (w). no active drainage. There are venous stasis skin changes: venous stasis hyperpigmentation and hyperkeratosis. No s/s of infection.

## 2019-06-03 NOTE — ASSESSMENT
[FreeTextEntry1] : 67 y/o woman with lymphedema and stasis ulceration, with 2 wounds that are new in the dorsum of foot in addition to posterior calf. Overall today continues to improve. Cleansed wounds with soap and water and redressed with Xeroform and ace bandage. Granulating nicely and significantly smaller in size. Almost healed.\par \par Today the lower leg seems improving, will continue Santyl. Patient going on a cruise, recommended wound care as tolerated, will follow when she returns. The right calf wound finally seems healed. Will continue close monitoring.\par \par -ACE bandage for compression\par -Ambulation elevation\par -PO antibiotics\par -RTC next 2-3 weeks

## 2019-06-03 NOTE — HISTORY OF PRESENT ILLNESS
[FreeTextEntry1] : The patient is a 67 y/o female with pain and swelling to BLE for 3 months. There is an ulcer to the back of her right calf. There is no weeping of drainage of wound or legs. She has been using Santyl and Silvadene for wound healing. She was previously seen by Dr Rivera who debrided the wound and applied unna boots with limited improvement. Comorbidities include DM2, HTN and A Fib. She takes ASA 81 mg, Coumadin and Pravastatin 10 mg QD [de-identified] : 69 y/o female RTC for follow up, now using Santyl, Xeroform and ace twice/week, feels and looks better. Wound is smaller an granulating. She has been wearing an Unna Boot since before coming to our office in October 2018.

## 2019-06-03 NOTE — PHYSICAL EXAM
[2+] : left 2+ [1+] : left 1+ [Ankle Swelling (On Exam)] : present [Ankle Swelling Bilaterally] : bilaterally  [] : bilaterally [Ankle Swelling On The Left] : moderate [Alert] : alert [Oriented to Person] : oriented to person [Oriented to Place] : oriented to place [Oriented to Time] : oriented to time [Calm] : calm [de-identified] : WD, WN, NAD. Awake, alert, interactive. Ambulates slowly with a cane for support. [de-identified] : LETICIA, PERSHASHAL [de-identified] : supple [de-identified] : There is swelling and considerable ecchymosis to dorsal right foot. Pain with slight toe movements. [de-identified] : WWP. Ulcer to right posterior calf 8 cm (l) x 1 cm (w). no active drainage. There are venous stasis skin changes: venous stasis hyperpigmentation and hyperkeratosis. No s/s of infection.

## 2019-06-07 ENCOUNTER — APPOINTMENT (OUTPATIENT)
Dept: VASCULAR SURGERY | Facility: CLINIC | Age: 69
End: 2019-06-07
Payer: MEDICARE

## 2019-06-07 PROCEDURE — 99213 OFFICE O/P EST LOW 20 MIN: CPT

## 2019-06-09 VITALS — WEIGHT: 259.93 LBS | HEIGHT: 70 IN

## 2019-06-09 LAB
ALBUMIN SERPL ELPH-MCNC: 2.7 G/DL — LOW (ref 3.3–5)
ALP SERPL-CCNC: 74 U/L — SIGNIFICANT CHANGE UP (ref 40–120)
ALT FLD-CCNC: 20 U/L — SIGNIFICANT CHANGE UP (ref 12–78)
ANION GAP SERPL CALC-SCNC: 10 MMOL/L — SIGNIFICANT CHANGE UP (ref 5–17)
APTT BLD: 31.1 SEC — SIGNIFICANT CHANGE UP (ref 27.5–36.3)
AST SERPL-CCNC: 10 U/L — LOW (ref 15–37)
BASOPHILS # BLD AUTO: 0.07 K/UL — SIGNIFICANT CHANGE UP (ref 0–0.2)
BASOPHILS NFR BLD AUTO: 0.6 % — SIGNIFICANT CHANGE UP (ref 0–2)
BILIRUB SERPL-MCNC: 0.3 MG/DL — SIGNIFICANT CHANGE UP (ref 0.2–1.2)
BUN SERPL-MCNC: 16 MG/DL — SIGNIFICANT CHANGE UP (ref 7–23)
CALCIUM SERPL-MCNC: 9.1 MG/DL — SIGNIFICANT CHANGE UP (ref 8.5–10.1)
CHLORIDE SERPL-SCNC: 101 MMOL/L — SIGNIFICANT CHANGE UP (ref 96–108)
CO2 SERPL-SCNC: 27 MMOL/L — SIGNIFICANT CHANGE UP (ref 22–31)
CREAT SERPL-MCNC: 0.73 MG/DL — SIGNIFICANT CHANGE UP (ref 0.5–1.3)
EOSINOPHIL # BLD AUTO: 0.09 K/UL — SIGNIFICANT CHANGE UP (ref 0–0.5)
EOSINOPHIL NFR BLD AUTO: 0.8 % — SIGNIFICANT CHANGE UP (ref 0–6)
GLUCOSE BLDC GLUCOMTR-MCNC: 148 MG/DL — HIGH (ref 70–99)
GLUCOSE SERPL-MCNC: 115 MG/DL — HIGH (ref 70–99)
HCT VFR BLD CALC: 38.2 % — SIGNIFICANT CHANGE UP (ref 34.5–45)
HGB BLD-MCNC: 12.1 G/DL — SIGNIFICANT CHANGE UP (ref 11.5–15.5)
IMM GRANULOCYTES NFR BLD AUTO: 0.9 % — SIGNIFICANT CHANGE UP (ref 0–1.5)
INR BLD: 1.34 RATIO — HIGH (ref 0.88–1.16)
LYMPHOCYTES # BLD AUTO: 1.89 K/UL — SIGNIFICANT CHANGE UP (ref 1–3.3)
LYMPHOCYTES # BLD AUTO: 17.5 % — SIGNIFICANT CHANGE UP (ref 13–44)
MCHC RBC-ENTMCNC: 29.7 PG — SIGNIFICANT CHANGE UP (ref 27–34)
MCHC RBC-ENTMCNC: 31.7 GM/DL — LOW (ref 32–36)
MCV RBC AUTO: 93.6 FL — SIGNIFICANT CHANGE UP (ref 80–100)
MONOCYTES # BLD AUTO: 0.8 K/UL — SIGNIFICANT CHANGE UP (ref 0–0.9)
MONOCYTES NFR BLD AUTO: 7.4 % — SIGNIFICANT CHANGE UP (ref 2–14)
NEUTROPHILS # BLD AUTO: 7.83 K/UL — HIGH (ref 1.8–7.4)
NEUTROPHILS NFR BLD AUTO: 72.8 % — SIGNIFICANT CHANGE UP (ref 43–77)
NT-PROBNP SERPL-SCNC: 892 PG/ML — HIGH (ref 0–125)
PLATELET # BLD AUTO: 529 K/UL — HIGH (ref 150–400)
POTASSIUM SERPL-MCNC: 3.6 MMOL/L — SIGNIFICANT CHANGE UP (ref 3.5–5.3)
POTASSIUM SERPL-SCNC: 3.6 MMOL/L — SIGNIFICANT CHANGE UP (ref 3.5–5.3)
PROT SERPL-MCNC: 7.6 GM/DL — SIGNIFICANT CHANGE UP (ref 6–8.3)
PROTHROM AB SERPL-ACNC: 15 SEC — HIGH (ref 10–12.9)
RBC # BLD: 4.08 M/UL — SIGNIFICANT CHANGE UP (ref 3.8–5.2)
RBC # FLD: 14.3 % — SIGNIFICANT CHANGE UP (ref 10.3–14.5)
SODIUM SERPL-SCNC: 138 MMOL/L — SIGNIFICANT CHANGE UP (ref 135–145)
TROPONIN I SERPL-MCNC: <0.015 NG/ML — SIGNIFICANT CHANGE UP (ref 0.01–0.04)
TROPONIN I SERPL-MCNC: <0.015 NG/ML — SIGNIFICANT CHANGE UP (ref 0.01–0.04)
WBC # BLD: 10.78 K/UL — HIGH (ref 3.8–10.5)
WBC # FLD AUTO: 10.78 K/UL — HIGH (ref 3.8–10.5)

## 2019-06-09 PROCEDURE — 93010 ELECTROCARDIOGRAM REPORT: CPT | Mod: 77

## 2019-06-09 PROCEDURE — 71275 CT ANGIOGRAPHY CHEST: CPT | Mod: 26

## 2019-06-09 PROCEDURE — 93010 ELECTROCARDIOGRAM REPORT: CPT

## 2019-06-09 PROCEDURE — 71045 X-RAY EXAM CHEST 1 VIEW: CPT | Mod: 26

## 2019-06-09 PROCEDURE — 74174 CTA ABD&PLVS W/CONTRAST: CPT | Mod: 26

## 2019-06-09 PROCEDURE — 76830 TRANSVAGINAL US NON-OB: CPT | Mod: 26

## 2019-06-09 PROCEDURE — 99285 EMERGENCY DEPT VISIT HI MDM: CPT

## 2019-06-09 RX ORDER — FAMOTIDINE 10 MG/ML
20 INJECTION INTRAVENOUS ONCE
Refills: 0 | Status: COMPLETED | OUTPATIENT
Start: 2019-06-09 | End: 2019-06-09

## 2019-06-09 RX ORDER — COLLAGENASE CLOSTRIDIUM HIST. 250 UNIT/G
1 OINTMENT (GRAM) TOPICAL DAILY
Refills: 0 | Status: DISCONTINUED | OUTPATIENT
Start: 2019-06-09 | End: 2019-06-13

## 2019-06-09 RX ORDER — HYDROMORPHONE HYDROCHLORIDE 2 MG/ML
2 INJECTION INTRAMUSCULAR; INTRAVENOUS; SUBCUTANEOUS ONCE
Refills: 0 | Status: DISCONTINUED | OUTPATIENT
Start: 2019-06-09 | End: 2019-06-09

## 2019-06-09 RX ORDER — ONDANSETRON 8 MG/1
4 TABLET, FILM COATED ORAL EVERY 6 HOURS
Refills: 0 | Status: DISCONTINUED | OUTPATIENT
Start: 2019-06-09 | End: 2019-06-13

## 2019-06-09 RX ORDER — MONTELUKAST 4 MG/1
10 TABLET, CHEWABLE ORAL AT BEDTIME
Refills: 0 | Status: DISCONTINUED | OUTPATIENT
Start: 2019-06-09 | End: 2019-06-13

## 2019-06-09 RX ORDER — LIDOCAINE 4 G/100G
1 CREAM TOPICAL ONCE
Refills: 0 | Status: COMPLETED | OUTPATIENT
Start: 2019-06-09 | End: 2019-06-09

## 2019-06-09 RX ORDER — DOCUSATE SODIUM 100 MG
100 CAPSULE ORAL DAILY
Refills: 0 | Status: DISCONTINUED | OUTPATIENT
Start: 2019-06-09 | End: 2019-06-13

## 2019-06-09 RX ORDER — DEXTROSE 50 % IN WATER 50 %
15 SYRINGE (ML) INTRAVENOUS ONCE
Refills: 0 | Status: DISCONTINUED | OUTPATIENT
Start: 2019-06-09 | End: 2019-06-13

## 2019-06-09 RX ORDER — DEXTROSE 50 % IN WATER 50 %
12.5 SYRINGE (ML) INTRAVENOUS ONCE
Refills: 0 | Status: DISCONTINUED | OUTPATIENT
Start: 2019-06-09 | End: 2019-06-13

## 2019-06-09 RX ORDER — RIVAROXABAN 15 MG-20MG
20 KIT ORAL EVERY 24 HOURS
Refills: 0 | Status: DISCONTINUED | OUTPATIENT
Start: 2019-06-09 | End: 2019-06-13

## 2019-06-09 RX ORDER — MORPHINE SULFATE 50 MG/1
4 CAPSULE, EXTENDED RELEASE ORAL ONCE
Refills: 0 | Status: DISCONTINUED | OUTPATIENT
Start: 2019-06-09 | End: 2019-06-09

## 2019-06-09 RX ORDER — DILTIAZEM HCL 120 MG
120 CAPSULE, EXT RELEASE 24 HR ORAL DAILY
Refills: 0 | Status: DISCONTINUED | OUTPATIENT
Start: 2019-06-09 | End: 2019-06-09

## 2019-06-09 RX ORDER — METOPROLOL TARTRATE 50 MG
100 TABLET ORAL DAILY
Refills: 0 | Status: DISCONTINUED | OUTPATIENT
Start: 2019-06-09 | End: 2019-06-13

## 2019-06-09 RX ORDER — ONDANSETRON 8 MG/1
4 TABLET, FILM COATED ORAL ONCE
Refills: 0 | Status: DISCONTINUED | OUTPATIENT
Start: 2019-06-09 | End: 2019-06-09

## 2019-06-09 RX ORDER — SODIUM CHLORIDE 9 MG/ML
1000 INJECTION, SOLUTION INTRAVENOUS
Refills: 0 | Status: DISCONTINUED | OUTPATIENT
Start: 2019-06-09 | End: 2019-06-13

## 2019-06-09 RX ORDER — DEXTROSE 50 % IN WATER 50 %
25 SYRINGE (ML) INTRAVENOUS ONCE
Refills: 0 | Status: DISCONTINUED | OUTPATIENT
Start: 2019-06-09 | End: 2019-06-13

## 2019-06-09 RX ORDER — INSULIN LISPRO 100/ML
VIAL (ML) SUBCUTANEOUS
Refills: 0 | Status: DISCONTINUED | OUTPATIENT
Start: 2019-06-09 | End: 2019-06-13

## 2019-06-09 RX ORDER — IPRATROPIUM/ALBUTEROL SULFATE 18-103MCG
3 AEROSOL WITH ADAPTER (GRAM) INHALATION
Refills: 0 | Status: COMPLETED | OUTPATIENT
Start: 2019-06-09 | End: 2019-06-09

## 2019-06-09 RX ORDER — DILTIAZEM HCL 120 MG
120 CAPSULE, EXT RELEASE 24 HR ORAL DAILY
Refills: 0 | Status: DISCONTINUED | OUTPATIENT
Start: 2019-06-09 | End: 2019-06-13

## 2019-06-09 RX ORDER — HYDROMORPHONE HYDROCHLORIDE 2 MG/ML
2 INJECTION INTRAMUSCULAR; INTRAVENOUS; SUBCUTANEOUS EVERY 4 HOURS
Refills: 0 | Status: DISCONTINUED | OUTPATIENT
Start: 2019-06-09 | End: 2019-06-13

## 2019-06-09 RX ORDER — ONDANSETRON 8 MG/1
4 TABLET, FILM COATED ORAL ONCE
Refills: 0 | Status: COMPLETED | OUTPATIENT
Start: 2019-06-09 | End: 2019-06-09

## 2019-06-09 RX ORDER — FUROSEMIDE 40 MG
40 TABLET ORAL DAILY
Refills: 0 | Status: DISCONTINUED | OUTPATIENT
Start: 2019-06-09 | End: 2019-06-13

## 2019-06-09 RX ORDER — GLUCAGON INJECTION, SOLUTION 0.5 MG/.1ML
1 INJECTION, SOLUTION SUBCUTANEOUS ONCE
Refills: 0 | Status: DISCONTINUED | OUTPATIENT
Start: 2019-06-09 | End: 2019-06-13

## 2019-06-09 RX ORDER — ATORVASTATIN CALCIUM 80 MG/1
10 TABLET, FILM COATED ORAL AT BEDTIME
Refills: 0 | Status: DISCONTINUED | OUTPATIENT
Start: 2019-06-09 | End: 2019-06-13

## 2019-06-09 RX ORDER — LIDOCAINE 4 G/100G
10 CREAM TOPICAL ONCE
Refills: 0 | Status: COMPLETED | OUTPATIENT
Start: 2019-06-09 | End: 2019-06-09

## 2019-06-09 RX ORDER — ACETAMINOPHEN 500 MG
1000 TABLET ORAL ONCE
Refills: 0 | Status: COMPLETED | OUTPATIENT
Start: 2019-06-09 | End: 2019-06-09

## 2019-06-09 RX ORDER — GABAPENTIN 400 MG/1
300 CAPSULE ORAL
Refills: 0 | Status: DISCONTINUED | OUTPATIENT
Start: 2019-06-09 | End: 2019-06-13

## 2019-06-09 RX ORDER — POTASSIUM CHLORIDE 20 MEQ
20 PACKET (EA) ORAL DAILY
Refills: 0 | Status: DISCONTINUED | OUTPATIENT
Start: 2019-06-09 | End: 2019-06-13

## 2019-06-09 RX ORDER — LIDOCAINE 4 G/100G
10 CREAM TOPICAL ONCE
Refills: 0 | Status: DISCONTINUED | OUTPATIENT
Start: 2019-06-09 | End: 2019-06-09

## 2019-06-09 RX ADMIN — Medication 400 MILLIGRAM(S): at 23:54

## 2019-06-09 RX ADMIN — ATORVASTATIN CALCIUM 10 MILLIGRAM(S): 80 TABLET, FILM COATED ORAL at 21:39

## 2019-06-09 RX ADMIN — GABAPENTIN 300 MILLIGRAM(S): 400 CAPSULE ORAL at 18:11

## 2019-06-09 RX ADMIN — HYDROMORPHONE HYDROCHLORIDE 2 MILLIGRAM(S): 2 INJECTION INTRAMUSCULAR; INTRAVENOUS; SUBCUTANEOUS at 16:22

## 2019-06-09 RX ADMIN — MORPHINE SULFATE 4 MILLIGRAM(S): 50 CAPSULE, EXTENDED RELEASE ORAL at 13:43

## 2019-06-09 RX ADMIN — Medication 3 MILLILITER(S): at 10:30

## 2019-06-09 RX ADMIN — Medication 30 MILLILITER(S): at 16:10

## 2019-06-09 RX ADMIN — MORPHINE SULFATE 4 MILLIGRAM(S): 50 CAPSULE, EXTENDED RELEASE ORAL at 13:05

## 2019-06-09 RX ADMIN — Medication 0.5 MILLIGRAM(S): at 22:05

## 2019-06-09 RX ADMIN — HYDROMORPHONE HYDROCHLORIDE 2 MILLIGRAM(S): 2 INJECTION INTRAMUSCULAR; INTRAVENOUS; SUBCUTANEOUS at 18:30

## 2019-06-09 RX ADMIN — HYDROMORPHONE HYDROCHLORIDE 2 MILLIGRAM(S): 2 INJECTION INTRAMUSCULAR; INTRAVENOUS; SUBCUTANEOUS at 16:11

## 2019-06-09 RX ADMIN — Medication 3 MILLILITER(S): at 10:20

## 2019-06-09 RX ADMIN — ONDANSETRON 4 MILLIGRAM(S): 8 TABLET, FILM COATED ORAL at 12:55

## 2019-06-09 RX ADMIN — Medication 1 APPLICATION(S): at 19:35

## 2019-06-09 RX ADMIN — FAMOTIDINE 20 MILLIGRAM(S): 10 INJECTION INTRAVENOUS at 16:10

## 2019-06-09 RX ADMIN — MORPHINE SULFATE 4 MILLIGRAM(S): 50 CAPSULE, EXTENDED RELEASE ORAL at 12:01

## 2019-06-09 RX ADMIN — RIVAROXABAN 20 MILLIGRAM(S): KIT at 18:11

## 2019-06-09 RX ADMIN — MONTELUKAST 10 MILLIGRAM(S): 4 TABLET, CHEWABLE ORAL at 21:39

## 2019-06-09 RX ADMIN — ONDANSETRON 4 MILLIGRAM(S): 8 TABLET, FILM COATED ORAL at 19:35

## 2019-06-09 RX ADMIN — MORPHINE SULFATE 4 MILLIGRAM(S): 50 CAPSULE, EXTENDED RELEASE ORAL at 10:30

## 2019-06-09 RX ADMIN — Medication 120 MILLIGRAM(S): at 18:11

## 2019-06-09 RX ADMIN — MORPHINE SULFATE 4 MILLIGRAM(S): 50 CAPSULE, EXTENDED RELEASE ORAL at 13:53

## 2019-06-09 RX ADMIN — Medication 30 MILLILITER(S): at 23:16

## 2019-06-09 RX ADMIN — LIDOCAINE 1 PATCH: 4 CREAM TOPICAL at 18:11

## 2019-06-09 RX ADMIN — LIDOCAINE 1 PATCH: 4 CREAM TOPICAL at 16:10

## 2019-06-09 NOTE — ED ADULT NURSE NOTE - NSIMPLEMENTINTERV_GEN_ALL_ED
Implemented All Fall with Harm Risk Interventions:  [place] to call system. Call bell, personal items and telephone within reach. Instruct patient to call for assistance. Room bathroom lighting operational. Non-slip footwear when patient is off stretcher. Physically safe environment: no spills, clutter or unnecessary equipment. Stretcher in lowest position, wheels locked, appropriate side rails in place. Provide visual cue, wrist band, yellow gown, etc. Monitor gait and stability. Monitor for mental status changes and reorient to person, place, and time. Review medications for side effects contributing to fall risk. Reinforce activity limits and safety measures with patient and family. Provide visual clues: red socks.

## 2019-06-09 NOTE — CHART NOTE - NSCHARTNOTEFT_GEN_A_CORE
Called by RN about pt with nausea, given Zofran however still nauseous, no vomiting.    - Will give 0.5mg ativan PO x1 dose Called by RN about pt with nausea, given Zofran however still nauseous, no vomiting.    - Will give 0.5mg ativan PO x1 dose    Pt later also c/o epigastric pain. Pt was seen and examined at bedside, reports pain in the epigastric region, pt reports that the pain is not new, also feeling nauseous still, pt also c/o back pain. She denies CP or SOB, no vomiting.    Vital Signs Last 24 Hrs  T(C): 36.4 (09 Jun 2019 22:18), Max: 36.7 (09 Jun 2019 11:58)  T(F): 97.5 (09 Jun 2019 22:18), Max: 98.1 (09 Jun 2019 11:58)  HR: 79 (09 Jun 2019 22:18) (56 - 79)  BP: 154/67 (09 Jun 2019 22:18) (120/62 - 162/73)  RR: 20 (09 Jun 2019 22:18) (16 - 20)  SpO2: 100% (09 Jun 2019 22:18) (99% - 100%)    PE:  General; appears to be in mild discomfort  CV: normal s1/s2, no murmur  Abdomen: tender to palpation of epigastric region, rest of abdomen nontender on palpation    A/P:  Epigastric pain with nausea in pt who presented on admission with chest pain, possibly dyspepsia, r/o acute GI issue    -STAT lipase and trop x1 (although trops on admission neg x2)  - Maalox and viscous lidocaine (dose confirmed with pharmacy and ED)  - CT abdomen on admission without acute GI pathology; incidental finding of adnexal cyst  - Will reevaluate pt s/p above med    RN notified, discussed with Dr. Aguilar (PGY-3)

## 2019-06-09 NOTE — H&P ADULT - ASSESSMENT
Assessment    # Mid sternal chest pain, nausea r/o ACS, differential GERD/Indigestion, radiating from muscular back pain  # Lumbar and thoracic back pain likely muscle strain  # Adnexal Cyst on CT abd/pelvis  # CHF, baseline and compensated at this time  # Afib on Xarelto, UC, HTN, HLD, DM    Plan:   - Troponins are negative, unlikely ACS, Dr.Klepper funes in AM  - continue Statin, Xarelto, Toprol, Diltiazem, ASA 81mg, tele monitoring for 24 hours  - home medication Uceris to be verified by pharmacy, pt can take her own  - b/l LE chronic venous changes seem to be baseline at this time, wound nurse shantel, outpt  follow up as scheudled   - obgyn follow up for the adnexal Cyst as US transvaginal unable to visualize the ovaries  - Xray of the lumbar and thoracic area, lidocaine patch for pain control  - DVT ppx: xarelto  - Sliding scale

## 2019-06-09 NOTE — ED PROVIDER NOTE - CARDIAC, MLM
Normal rate, regular rhythm.  Heart sounds S1, S2.  No murmurs, rubs or gallops. +left leg trace pitting edema

## 2019-06-09 NOTE — H&P ADULT - NSHPPHYSICALEXAM_GEN_ALL_CORE
PHYSICAL EXAM:    Constitutional: NAD, awake and alert, well-developed  HEENT: PERR, EOMI, Normal Hearing, MMM  Neck: Soft and supple, No LAD, No JVD  Respiratory: Breath sounds are clear bilaterally, No wheezing, rales or rhonchi  Cardiovascular: S1 and S2, regular rate and rhythm, no Murmurs, gallops or rubs  Gastrointestinal: Bowel Sounds present, soft, nontender, nondistended, no guarding, no rebound  Extremities: 2+ pitting edema b/l. Left LE with ace wrap (pt refused exam and removal of ace wrap)  Vascular: 2+ peripheral pulses  Neurological: A/O x 3, no focal deficits  Musculoskeletal: 5/5 strength b/l upper and lower extremities  Skin: No rashes

## 2019-06-09 NOTE — ED ADULT TRIAGE NOTE - CHIEF COMPLAINT QUOTE
c/o shortness of breath, chest pain, lower back pain radiating up to middle back started yesterday, pt states her temperature was 95 yesterday, was discharged from rehab 3 days ago for cellulitis and ambulation, pt states she is done antibiotic treatment , O2 sat in triage 98% on RA, pulse 82 Hx: Afib and CHF

## 2019-06-09 NOTE — ED PROVIDER NOTE - CONSTITUTIONAL, MLM
normal... +obese, awake, alert, oriented to person, place, time/situation and in no apparent distress.

## 2019-06-09 NOTE — ED PROVIDER NOTE - OBJECTIVE STATEMENT
68 y/o female with PMHx of HFpEF (EF 55-60%), Afib (on Xarelto), HTN, HLD, chronic venous insufficiency with dermatitis/cellulitis, DMII, Ulcerative colitis, and thyroid nodule presents to the ED c/o worsening SOB that started last night, mid chest pain, and lower back pain that radiates to upper back that stared about 2 days ago. Denies pain with movement, LE edema. Pt states her temperature was 95F yesterday, was discharged from rehab 3 days ago for cellulitis and ambulation, pt states she finished antibiotic treatment. Patient follows with Dr. Justo Mayer.

## 2019-06-09 NOTE — H&P ADULT - HISTORY OF PRESENT ILLNESS
68 y/o female with PMHx of CHF, Afib (on Xarelto), HTN, HLD, chronic venous insufficiency with dermatitis/cellulitis, DMII, Ulcerative colitis, and thyroid nodule presents to the ED c/o worsening SOB that started last night. She also has been c/o lumbar and thoracic pain which radiates. Last night while sleeping she developed chest tightness that was constant and lasted until the morning. She did have nausea with it but denies vomitting. She denies GERD or indigestion.     She was recently at [facility] and discharged 3 days ago. She has PVD and has had procedures to improve circulation in the RLE with  recently as 3 weeks ago. She was seen by him 3 days ago and has a follow up for RLE in 2 days. and recently Denies pain with movement, LE edema. Pt states her temperature was 95F yesterday, was discharged from rehab 3 days ago for cellulitis and ambulation, pt states she finished antibiotic treatment. Patient follows with Dr. Justo Mayer.

## 2019-06-10 ENCOUNTER — INPATIENT (INPATIENT)
Facility: HOSPITAL | Age: 69
LOS: 2 days | Discharge: HOME | End: 2019-06-13
Attending: INTERNAL MEDICINE | Admitting: INTERNAL MEDICINE
Payer: MEDICARE

## 2019-06-10 DIAGNOSIS — Z98.890 OTHER SPECIFIED POSTPROCEDURAL STATES: Chronic | ICD-10-CM

## 2019-06-10 DIAGNOSIS — K95.09 OTHER COMPLICATIONS OF GASTRIC BAND PROCEDURE: Chronic | ICD-10-CM

## 2019-06-10 LAB
ANION GAP SERPL CALC-SCNC: 7 MMOL/L — SIGNIFICANT CHANGE UP (ref 5–17)
BASOPHILS # BLD AUTO: 0.1 K/UL — SIGNIFICANT CHANGE UP (ref 0–0.2)
BASOPHILS NFR BLD AUTO: 0.8 % — SIGNIFICANT CHANGE UP (ref 0–2)
BUN SERPL-MCNC: 11 MG/DL — SIGNIFICANT CHANGE UP (ref 7–23)
CALCIUM SERPL-MCNC: 8.8 MG/DL — SIGNIFICANT CHANGE UP (ref 8.5–10.1)
CHLORIDE SERPL-SCNC: 96 MMOL/L — SIGNIFICANT CHANGE UP (ref 96–108)
CO2 SERPL-SCNC: 28 MMOL/L — SIGNIFICANT CHANGE UP (ref 22–31)
CREAT SERPL-MCNC: 0.55 MG/DL — SIGNIFICANT CHANGE UP (ref 0.5–1.3)
EOSINOPHIL # BLD AUTO: 0.07 K/UL — SIGNIFICANT CHANGE UP (ref 0–0.5)
EOSINOPHIL NFR BLD AUTO: 0.5 % — SIGNIFICANT CHANGE UP (ref 0–6)
GLUCOSE BLDC GLUCOMTR-MCNC: 112 MG/DL — HIGH (ref 70–99)
GLUCOSE BLDC GLUCOMTR-MCNC: 151 MG/DL — HIGH (ref 70–99)
GLUCOSE BLDC GLUCOMTR-MCNC: 157 MG/DL — HIGH (ref 70–99)
GLUCOSE SERPL-MCNC: 148 MG/DL — HIGH (ref 70–99)
HCT VFR BLD CALC: 37.1 % — SIGNIFICANT CHANGE UP (ref 34.5–45)
HGB BLD-MCNC: 11.7 G/DL — SIGNIFICANT CHANGE UP (ref 11.5–15.5)
IMM GRANULOCYTES NFR BLD AUTO: 0.7 % — SIGNIFICANT CHANGE UP (ref 0–1.5)
LIDOCAIN IGE QN: 157 U/L — SIGNIFICANT CHANGE UP (ref 73–393)
LYMPHOCYTES # BLD AUTO: 0.83 K/UL — LOW (ref 1–3.3)
LYMPHOCYTES # BLD AUTO: 6.4 % — LOW (ref 13–44)
MCHC RBC-ENTMCNC: 29.3 PG — SIGNIFICANT CHANGE UP (ref 27–34)
MCHC RBC-ENTMCNC: 31.5 GM/DL — LOW (ref 32–36)
MCV RBC AUTO: 92.8 FL — SIGNIFICANT CHANGE UP (ref 80–100)
MONOCYTES # BLD AUTO: 0.66 K/UL — SIGNIFICANT CHANGE UP (ref 0–0.9)
MONOCYTES NFR BLD AUTO: 5.1 % — SIGNIFICANT CHANGE UP (ref 2–14)
NEUTROPHILS # BLD AUTO: 11.28 K/UL — HIGH (ref 1.8–7.4)
NEUTROPHILS NFR BLD AUTO: 86.5 % — HIGH (ref 43–77)
PLATELET # BLD AUTO: 487 K/UL — HIGH (ref 150–400)
POTASSIUM SERPL-MCNC: 3.7 MMOL/L — SIGNIFICANT CHANGE UP (ref 3.5–5.3)
POTASSIUM SERPL-SCNC: 3.7 MMOL/L — SIGNIFICANT CHANGE UP (ref 3.5–5.3)
RBC # BLD: 4 M/UL — SIGNIFICANT CHANGE UP (ref 3.8–5.2)
RBC # FLD: 14.4 % — SIGNIFICANT CHANGE UP (ref 10.3–14.5)
SODIUM SERPL-SCNC: 131 MMOL/L — LOW (ref 135–145)
TROPONIN I SERPL-MCNC: <0.015 NG/ML — SIGNIFICANT CHANGE UP (ref 0.01–0.04)
WBC # BLD: 13.03 K/UL — HIGH (ref 3.8–10.5)
WBC # FLD AUTO: 13.03 K/UL — HIGH (ref 3.8–10.5)

## 2019-06-10 PROCEDURE — 72100 X-RAY EXAM L-S SPINE 2/3 VWS: CPT | Mod: 26

## 2019-06-10 PROCEDURE — 76536 US EXAM OF HEAD AND NECK: CPT | Mod: 26

## 2019-06-10 PROCEDURE — 99285 EMERGENCY DEPT VISIT HI MDM: CPT

## 2019-06-10 PROCEDURE — 72074 X-RAY EXAM THORAC SPINE4/>VW: CPT | Mod: 26

## 2019-06-10 RX ORDER — ACETAMINOPHEN 500 MG
650 TABLET ORAL EVERY 6 HOURS
Refills: 0 | Status: DISCONTINUED | OUTPATIENT
Start: 2019-06-10 | End: 2019-06-13

## 2019-06-10 RX ORDER — LANOLIN ALCOHOL/MO/W.PET/CERES
5 CREAM (GRAM) TOPICAL ONCE
Refills: 0 | Status: COMPLETED | OUTPATIENT
Start: 2019-06-10 | End: 2019-06-10

## 2019-06-10 RX ADMIN — Medication 5 MILLIGRAM(S): at 22:48

## 2019-06-10 RX ADMIN — Medication 20 MILLIEQUIVALENT(S): at 12:58

## 2019-06-10 RX ADMIN — HYDROMORPHONE HYDROCHLORIDE 2 MILLIGRAM(S): 2 INJECTION INTRAMUSCULAR; INTRAVENOUS; SUBCUTANEOUS at 01:43

## 2019-06-10 RX ADMIN — Medication 1: at 12:59

## 2019-06-10 RX ADMIN — HYDROMORPHONE HYDROCHLORIDE 2 MILLIGRAM(S): 2 INJECTION INTRAMUSCULAR; INTRAVENOUS; SUBCUTANEOUS at 17:52

## 2019-06-10 RX ADMIN — Medication 1000 MILLIGRAM(S): at 00:24

## 2019-06-10 RX ADMIN — ONDANSETRON 4 MILLIGRAM(S): 8 TABLET, FILM COATED ORAL at 01:43

## 2019-06-10 RX ADMIN — ONDANSETRON 4 MILLIGRAM(S): 8 TABLET, FILM COATED ORAL at 22:02

## 2019-06-10 RX ADMIN — Medication 40 MILLIGRAM(S): at 12:59

## 2019-06-10 RX ADMIN — Medication 100 MILLIGRAM(S): at 12:58

## 2019-06-10 RX ADMIN — MONTELUKAST 10 MILLIGRAM(S): 4 TABLET, CHEWABLE ORAL at 22:00

## 2019-06-10 RX ADMIN — GABAPENTIN 300 MILLIGRAM(S): 400 CAPSULE ORAL at 06:03

## 2019-06-10 RX ADMIN — Medication 1 APPLICATION(S): at 12:53

## 2019-06-10 RX ADMIN — Medication 100 MILLIGRAM(S): at 06:03

## 2019-06-10 RX ADMIN — Medication 120 MILLIGRAM(S): at 06:03

## 2019-06-10 RX ADMIN — HYDROMORPHONE HYDROCHLORIDE 2 MILLIGRAM(S): 2 INJECTION INTRAMUSCULAR; INTRAVENOUS; SUBCUTANEOUS at 12:58

## 2019-06-10 RX ADMIN — RIVAROXABAN 20 MILLIGRAM(S): KIT at 17:52

## 2019-06-10 RX ADMIN — LIDOCAINE 1 PATCH: 4 CREAM TOPICAL at 03:17

## 2019-06-10 RX ADMIN — HYDROMORPHONE HYDROCHLORIDE 2 MILLIGRAM(S): 2 INJECTION INTRAMUSCULAR; INTRAVENOUS; SUBCUTANEOUS at 22:02

## 2019-06-10 RX ADMIN — ATORVASTATIN CALCIUM 10 MILLIGRAM(S): 80 TABLET, FILM COATED ORAL at 22:00

## 2019-06-10 RX ADMIN — Medication 20 MILLIGRAM(S): at 06:04

## 2019-06-10 RX ADMIN — HYDROMORPHONE HYDROCHLORIDE 2 MILLIGRAM(S): 2 INJECTION INTRAMUSCULAR; INTRAVENOUS; SUBCUTANEOUS at 22:32

## 2019-06-10 RX ADMIN — GABAPENTIN 300 MILLIGRAM(S): 400 CAPSULE ORAL at 17:52

## 2019-06-10 RX ADMIN — Medication 1: at 08:48

## 2019-06-10 NOTE — ADVANCED PRACTICE NURSE CONSULT - ASSESSMENT
This is a 69 year old female that was admitted to the hospital on 6/9/2019 for shortness of breath.  PMH- CHF, Afib (on Xarelto), HTN, HLD, chronic venous insufficiency with dermatitis/cellulitis, DMII, Ulcerative colitis, and thyroid nodule.    Requested by MD to assess patient's LLE wound. Patient reports she sees Dr. Yoo (vascular) as an outpatient. She also has visiting nurse coming to the house for daily dressing changes of collagenase and gauze wrapped in kerlix.  Old dressing removed from LLE.  Dressing noted to be saturated with sero-sang drainage No odor. Wound noted to medial and posterior aspect of lower extremity Wound bed with 90% yellow and 10% brown slough/eschar. No odor. Periwound intact. Extremity warm to touch. Positive pedal pulse palpated.  Collagenase applied to wound bed for enzymatic debridement and covered with nonadhesive foam dressing and wrapped with kerlix.  Patient remains resting in bed.

## 2019-06-10 NOTE — CONSULT NOTE ADULT - SUBJECTIVE AND OBJECTIVE BOX
CHIEF COMPLAINT: Patient is a 69y old  Female who presents with a chief complaint of SOB, CP (09 Jun 2019 15:10)      HPI: HPI:  68 y/o female with PMHx of CHF, Afib (on Xarelto), HTN, HLD, chronic venous insufficiency with dermatitis/cellulitis, DMII, Ulcerative colitis, and thyroid nodule presents to the ED c/o worsening SOB that started last night. She also has been c/o lumbar and thoracic pain which radiates. Last night while sleeping she developed chest tightness that was constant and lasted until the morning. She did have nausea with it but denies vomitting. She denies GERD or indigestion.     She was recently at [facility] and discharged 3 days ago. She has PVD and has had procedures to improve circulation in the RLE with  recently as 3 weeks ago. She was seen by him 3 days ago and has a follow up for RLE in 2 days. and recently Denies pain with movement, LE edema. Pt states her temperature was 95F yesterday, was discharged from rehab 3 days ago for cellulitis and ambulation, pt states she finished antibiotic treatment. Patient follows with Dr. Justo Mayer. (09 Jun 2019 15:10)      PMHx: PAST MEDICAL & SURGICAL HISTORY:  Cellulitis  HTN (hypertension)  Thyroid nodule  Peripheral venous insufficiency  Neuropathy  Morbid obesity with BMI of 40.0-44.9, adult  Dyspnea  Congestive heart failure  Atrial fibrillation  Diabetes mellitus type II, controlled  Status post medial meniscus repair  S/P left knee arthroscopy  Other complications of gastric band procedure  H/O colonoscopy  History of esophagogastroduodenoscopy (EGD)        Soc Hx:     FAMILY HISTORY:  Family history of diabetes mellitus in mother  Family history of breast cancer in mother      Allergies: Allergies    penicillin (Hives)    Intolerances          REVIEW OF SYSTEMS:    As above  + chest pain and shortness of breath  No lightheadeness or syncope  No leg swelling  No palpitations  No claudication-like symptoms    Vital Signs Last 24 Hrs  T(C): 36.3 (10 Isaias 2019 05:29), Max: 36.7 (09 Jun 2019 11:58)  T(F): 97.3 (10 Isaias 2019 05:29), Max: 98.1 (09 Jun 2019 11:58)  HR: 87 (10 Isaias 2019 05:29) (56 - 87)  BP: 166/83 (10 Isaias 2019 05:29) (120/62 - 166/83)  BP(mean): --  RR: 20 (10 Isaias 2019 05:29) (16 - 20)  SpO2: 98% (10 Isaias 2019 05:29) (98% - 100%)    I&O's Summary    09 Jun 2019 07:01  -  10 Isaias 2019 07:00  --------------------------------------------------------  IN: 550 mL / OUT: 0 mL / NET: 550 mL          CAPILLARY BLOOD GLUCOSE      POCT Blood Glucose.: 148 mg/dL (09 Jun 2019 16:02)      PHYSICAL EXAM:   Patient in NAD  Neck: No JVD; Carotids:  2+ without bruits  Respiratory:  Clear to A&P  Cardiovascular: S1 and S2, irregular rate and rhythm, no Murmurs, gallops or rubs  Gastrointestinal:  Soft, non-tender; BS positive  Extremities: + chronic bilateral edema/venous stasis changes  Neurological: A/O x 3, no focal deficits      MEDICATIONS:  MEDICATIONS  (STANDING):  atorvastatin 10 milliGRAM(s) Oral at bedtime  collagenase Ointment 1 Application(s) Topical daily  dextrose 5%. 1000 milliLiter(s) (50 mL/Hr) IV Continuous <Continuous>  dextrose 50% Injectable 12.5 Gram(s) IV Push once  dextrose 50% Injectable 25 Gram(s) IV Push once  dextrose 50% Injectable 25 Gram(s) IV Push once  dicyclomine 20 milliGRAM(s) Oral before breakfast  diltiazem    milliGRAM(s) Oral daily  docusate sodium 100 milliGRAM(s) Oral daily  furosemide    Tablet 40 milliGRAM(s) Oral daily  gabapentin 300 milliGRAM(s) Oral two times a day  insulin lispro (HumaLOG) corrective regimen sliding scale   SubCutaneous three times a day before meals  metoprolol succinate  milliGRAM(s) Oral daily  montelukast 10 milliGRAM(s) Oral at bedtime  potassium chloride    Tablet ER 20 milliEquivalent(s) Oral daily  rivaroxaban 20 milliGRAM(s) Oral every 24 hours      LABS: All Labs Reviewed:  Blood Culture:   BNP Serum Pro-Brain Natriuretic Peptide: 892 pg/mL (06-09 @ 09:47)    CBC             WBC Count: 10.78 K/uL (06-09 @ 09:47)              Hemoglobin: 12.1 g/dL (06-09 @ 09:47)              Hematocrit: 38.2 % (06-09 @ 09:47)              Mean Cell Volume: 93.6 fl (06-09 @ 09:47)              Platelet Count - Automated: 529 K/uL (06-09 @ 09:47)                            Cardiac markers             Troponin I, Serum: <0.015 ng/mL (06-09 @ 23:37)  Troponin I, Serum: <0.015 ng/mL (06-09 @ 12:25)  Troponin I, Serum: <0.015 ng/mL (06-09 @ 09:47)                             Chems        Sodium, Serum: 138 mmol/L (06-09 @ 09:47)          Potassium, Serum: 3.6 mmol/L (06-09 @ 09:47)          Blood Urea Nitrogen, Serum: 16 mg/dL (06-09 @ 09:47)          Creatinine 0.73                  Protein Total, Serum: 7.6 gm/dL (06-09 @ 09:47)                  Calcium, Total Serum: 9.1 mg/dL (06-09 @ 09:47)                  Bilirubin Total, Serum: 0.3 mg/dL (06-09 @ 09:47)          Alanine Aminotransferase (ALT/SGPT): 20 U/L (06-09 @ 09:47)          Aspartate Aminotransferase (AST/SGOT): 10 U/L (06-09 @ 09:47)                 INR: 1.34 ratio (06-09 @ 09:47)             RADIOLOGY:  < from: CT Angio Abdomen and Pelvis w/ IV Cont (06.09.19 @ 11:20) >  IMPRESSION:   No intramural hematoma or aortic dissection.    Right adnexalcyst, measuring 4.3 cm, which further evaluation with   pelvic ultrasound is recommended.    < end of copied text >      EKG:  Atrial fibrillation    Telemetry:  Atrial fibrillation    ECHO:

## 2019-06-10 NOTE — PROGRESS NOTE ADULT - ASSESSMENT
68 y/o female with PMHx of CHF, Afib (on Xarelto), HTN, HLD, chronic venous insufficiency with dermatitis/cellulitis, DMII, Ulcerative colitis, and thyroid nodule ; admitted with       # Mid sternal chest pain, nausea r/o ACS, differential GERD/Indigestion, radiating from muscular back pain: MI ruled out  # Lumbar and thoracic back pain likely muscle strain  # Adnexal Cyst on CT abd/pelvis  # CHF, baseline and compensated at this time  # Afib on Xarelto, UC, HTN, HLD, DM    Plan:   - Troponins are negative, unlikely ACS, Dr.Klepper funes in AM appreciated;  No MI; tele rate controlled A fib; d/c tele, transfer to reg med floor  - continue Statin, Xarelto, Toprol, Diltiazem, ASA 81mg,   - home medication Uceris to be verified by pharmacy, pt can take her own  - b/l LE chronic venous changes seem to be baseline at this time, wound nurse shantel, outpt  follow up as scheudled   - obgyn follow up for the adnexal Cyst as US transvaginal unable to visualize the ovaries  - Xray of the lumbar and thoracic area, lidocaine patch for pain control  - ID consult for leukocytosis with ulcers   - DVT ppx: xarelto  - Sliding scale    poc discussed with pt, team 68 y/o female with PMHx of CHF, Afib (on Xarelto), HTN, HLD, chronic venous insufficiency with dermatitis/cellulitis, DMII, Ulcerative colitis, and thyroid nodule ; admitted with       # Mid sternal chest pain, nausea r/o ACS, differential GERD/Indigestion, radiating from muscular back pain: MI ruled out  # Lumbar and thoracic back pain likely muscle strain  # Adnexal Cyst on CT abd/pelvis  # CHF, baseline and compensated at this time  # Afib on Xarelto, UC, HTN, HLD, DM    Plan:   - Troponins are negative, unlikely ACS, Dr.Klepper funes in AM appreciated;  No MI; tele rate controlled A fib; d/c tele, transfer to [facility]  - continue Statin, Xarelto, Toprol, Diltiazem, ASA 81mg,   - home medication Uceris to be verified by pharmacy, pt can take her own  - b/l LE chronic venous changes seem to be baseline at this time, wound nurse hsantel, outpt  follow up as scheudled   - obgyn follow up for the adnexal Cyst as US transvaginal unable to visualize the ovaries  - Xray of the lumbar and thoracic area, lidocaine patch for pain control  - ID consult for leukocytosis with ulcers   - DVT ppx: xarelto  - Sliding scale    poc discussed with pt, team    d/c tele, transfer to [facility]

## 2019-06-10 NOTE — CONSULT NOTE ADULT - SUBJECTIVE AND OBJECTIVE BOX
Patient is a 69y old  Female who presents with a chief complaint of SOB, CP (10 Isaias 2019 09:34)    HPI:  70 y/o female with h/o CHF, Afib (on Xarelto), HTN, HLD, chronic venous insufficiency with dermatitis/cellulitis s/p multiple courses of abx therapy, DM type 2, Ulcerative colitis, thyroid nodule was admitted on  for worsening SOB x one day. She also has been c/o lumbar and thoracic pain. The night PTA, while sleeping, she developed chest tightness that was constant and lasted until the morning. She did have nausea.     She was recently at [facility] and discharged 3 days PTA. She has PVD and has had procedures to improve circulation in the RLE with Dr.Santini vickers 3 weeks ago. She was seen by him 3 days PTA. Denies pain with movement, LE edema. Pt states she finished antibiotic treatment while in rehab.      PMH: as above  PSH: as above  Meds: per reconciliation sheet, noted below  MEDICATIONS  (STANDING):  atorvastatin 10 milliGRAM(s) Oral at bedtime  collagenase Ointment 1 Application(s) Topical daily  dextrose 5%. 1000 milliLiter(s) (50 mL/Hr) IV Continuous <Continuous>  dextrose 50% Injectable 12.5 Gram(s) IV Push once  dextrose 50% Injectable 25 Gram(s) IV Push once  dextrose 50% Injectable 25 Gram(s) IV Push once  dicyclomine 20 milliGRAM(s) Oral before breakfast  diltiazem    milliGRAM(s) Oral daily  docusate sodium 100 milliGRAM(s) Oral daily  furosemide    Tablet 40 milliGRAM(s) Oral daily  gabapentin 300 milliGRAM(s) Oral two times a day  insulin lispro (HumaLOG) corrective regimen sliding scale   SubCutaneous three times a day before meals  metoprolol succinate  milliGRAM(s) Oral daily  montelukast 10 milliGRAM(s) Oral at bedtime  potassium chloride    Tablet ER 20 milliEquivalent(s) Oral daily  rivaroxaban 20 milliGRAM(s) Oral every 24 hours    MEDICATIONS  (PRN):  acetaminophen   Tablet .. 650 milliGRAM(s) Oral every 6 hours PRN Mild Pain (1 - 3)  dextrose 40% Gel 15 Gram(s) Oral once PRN Blood Glucose LESS THAN 70 milliGRAM(s)/deciliter  glucagon  Injectable 1 milliGRAM(s) IntraMuscular once PRN Glucose LESS THAN 70 milligrams/deciliter  HYDROmorphone   Tablet 2 milliGRAM(s) Oral every 4 hours PRN Moderate Pain (4 - 6)  ondansetron Injectable 4 milliGRAM(s) IV Push every 6 hours PRN Nausea    Allergies    penicillin (Hives)    Intolerances      Social: no smoking, no alcohol, no illegal drugs; no recent travel, no exposure to TB  FAMILY HISTORY:  Family history of diabetes mellitus in mother  Family history of breast cancer in mother    no history of premature cardiovascular disease in first degree relatives    ROS: the patient denies fever, no chills, no HA, no seizures, no dizziness, no sore throat, no nasal congestion, no blurry vision, no CP, no palpitations, no SOB, no cough, no abdominal pain, no diarrhea, no N/V, no dysuria, no leg pain, no claudication, no rash, no joint aches, no rectal pain or bleeding, no night sweats  All other systems reviewed and are negative    Vital Signs Last 24 Hrs  T(C): 36.9 (10 Isaias 2019 10:22), Max: 36.9 (10 Isaias 2019 10:22)  T(F): 98.5 (10 Isaias 2019 10:22), Max: 98.5 (10 Isaias 2019 10:22)  HR: 69 (10 Isaias 2019 10:22) (69 - 87)  BP: 148/70 (10 Isaias 2019 10:22) (148/70 - 166/83)  BP(mean): --  RR: 18 (10 Isaias 2019 10:22) (18 - 20)  SpO2: 98% (10 Isaias 2019 10:22) (98% - 100%)  Daily     Daily Weight in k.9 (10 Isaias 2019 07:32)    PE:    Constitutional: frail looking  HEENT: NC/AT, EOMI, PERRLA, conjunctivae clear; ears and nose atraumatic; pharynx benign  Neck: supple; thyroid not palpable  Back: no tenderness  Respiratory: respiratory effort normal; clear to auscultation  Cardiovascular: S1S2 regular, no murmurs  Abdomen: soft, not tender, not distended, positive BS; no liver or spleen organomegaly  Genitourinary: no suprapubic tenderness  Lymphatic: no LN palpable  Musculoskeletal: no muscle tenderness, no joint swelling or tenderness  Extremities: no pedal edema  Neurological/ Psychiatric: AxOx3, judgement and insight normal; moving all extremities  Skin: no rashes; no palpable lesions    Labs: all available labs reviewed                        11.7   13.03 )-----------( 487      ( 10 Isaias 2019 07:14 )             37.1     06-10    131<L>  |  96  |  11  ----------------------------<  148<H>  3.7   |  28  |  0.55    Ca    8.8      10 Isaias 2019 07:14    TPro  7.6  /  Alb  2.7<L>  /  TBili  0.3  /  DBili  x   /  AST  10<L>  /  ALT  20  /  AlkPhos  74  06-09     LIVER FUNCTIONS - ( 2019 09:47 )  Alb: 2.7 g/dL / Pro: 7.6 gm/dL / ALK PHOS: 74 U/L / ALT: 20 U/L / AST: 10 U/L / GGT: x                 Radiology: all available radiological tests reviewed    Advanced directives addressed: full resuscitation Patient is a 69y old  Female who presents with a chief complaint of SOB, CP (10 Isaias 2019 09:34)    HPI:  68 y/o female with h/o CHF, Afib (on Xarelto), HTN, HLD, chronic venous insufficiency with dermatitis/cellulitis s/p multiple courses of abx therapy, DM type 2, Ulcerative colitis, thyroid nodule was admitted on  for worsening SOB x one day. She also has been c/o lumbar and thoracic pain. The night PTA, while sleeping, she developed chest tightness that was constant and lasted until the morning. She did have nausea.     She was recently at [facility] and discharged 3 days PTA. She has PVD and has had procedures to improve circulation in the RLE with Dr.Santini vickers 3 weeks ago. She was seen by him 3 days PTA. Denies pain with movement, LE edema. Pt states she finished antibiotic treatment while in rehab.      PMH: as above  PSH: as above  Meds: per reconciliation sheet, noted below  MEDICATIONS  (STANDING):  atorvastatin 10 milliGRAM(s) Oral at bedtime  collagenase Ointment 1 Application(s) Topical daily  dextrose 5%. 1000 milliLiter(s) (50 mL/Hr) IV Continuous <Continuous>  dextrose 50% Injectable 12.5 Gram(s) IV Push once  dextrose 50% Injectable 25 Gram(s) IV Push once  dextrose 50% Injectable 25 Gram(s) IV Push once  dicyclomine 20 milliGRAM(s) Oral before breakfast  diltiazem    milliGRAM(s) Oral daily  docusate sodium 100 milliGRAM(s) Oral daily  furosemide    Tablet 40 milliGRAM(s) Oral daily  gabapentin 300 milliGRAM(s) Oral two times a day  insulin lispro (HumaLOG) corrective regimen sliding scale   SubCutaneous three times a day before meals  metoprolol succinate  milliGRAM(s) Oral daily  montelukast 10 milliGRAM(s) Oral at bedtime  potassium chloride    Tablet ER 20 milliEquivalent(s) Oral daily  rivaroxaban 20 milliGRAM(s) Oral every 24 hours    MEDICATIONS  (PRN):  acetaminophen   Tablet .. 650 milliGRAM(s) Oral every 6 hours PRN Mild Pain (1 - 3)  dextrose 40% Gel 15 Gram(s) Oral once PRN Blood Glucose LESS THAN 70 milliGRAM(s)/deciliter  glucagon  Injectable 1 milliGRAM(s) IntraMuscular once PRN Glucose LESS THAN 70 milligrams/deciliter  HYDROmorphone   Tablet 2 milliGRAM(s) Oral every 4 hours PRN Moderate Pain (4 - 6)  ondansetron Injectable 4 milliGRAM(s) IV Push every 6 hours PRN Nausea    Allergies    penicillin (Hives)    Intolerances      Social: no smoking, no alcohol, no illegal drugs; no recent travel, no exposure to TB  FAMILY HISTORY:  Family history of diabetes mellitus in mother  Family history of breast cancer in mother    no history of premature cardiovascular disease in first degree relatives    ROS: the patient denies fever, no chills, no HA, no seizures, no dizziness, no sore throat, no nasal congestion, no blurry vision, had CP, no palpitations, had SOB, no cough, no abdominal pain, no diarrhea, no N/V, no dysuria, no leg pain, no claudication, has lower legs rash, no joint aches, no rectal pain or bleeding, no night sweats  All other systems reviewed and are negative    Vital Signs Last 24 Hrs  T(C): 36.9 (10 Isaias 2019 10:22), Max: 36.9 (10 Isaias 2019 10:22)  T(F): 98.5 (10 Isaias 2019 10:22), Max: 98.5 (10 Isaias 2019 10:22)  HR: 69 (10 Isaias 2019 10:22) (69 - 87)  BP: 148/70 (10 Isaias 2019 10:22) (148/70 - 166/83)  BP(mean): --  RR: 18 (10 Isaias 2019 10:22) (18 - 20)  SpO2: 98% (10 Isaias 2019 10:22) (98% - 100%)  Daily     Daily Weight in k.9 (10 Isaias 2019 07:32)    PE:    Constitutional: frail looking  HEENT: NC/AT, EOMI, PERRLA, conjunctivae clear; ears and nose atraumatic; pharynx benign  Neck: supple; thyroid not palpable  Back: no tenderness  Respiratory: respiratory effort normal; clear to auscultation  Cardiovascular: S1S2 regular, no murmurs  Abdomen: soft, not tender, not distended, positive BS; no liver or spleen organomegaly  Genitourinary: no suprapubic tenderness  Lymphatic: no LN palpable  Musculoskeletal: no muscle tenderness, no joint swelling or tenderness  Extremities: 1+ pedal edema  Right lower leg chronic discoloration - no open wounds  Left lower extremity with superficial ulcers, clean base; mild erythema and skin discoloration  Neurological/ Psychiatric: AxOx3, judgement and insight normal; moving all extremities  Skin: no rashes; no palpable lesions    Labs: all available labs reviewed                        11.7   13.03 )-----------( 487      ( 10 Isaias 2019 07:14 )             37.1     06-10    131<L>  |  96  |  11  ----------------------------<  148<H>  3.7   |  28  |  0.55    Ca    8.8      10 Isaias 2019 07:14    TPro  7.6  /  Alb  2.7<L>  /  TBili  0.3  /  DBili  x   /  AST  10<L>  /  ALT  20  /  AlkPhos  74  06-09     LIVER FUNCTIONS - ( 2019 09:47 )  Alb: 2.7 g/dL / Pro: 7.6 gm/dL / ALK PHOS: 74 U/L / ALT: 20 U/L / AST: 10 U/L / GGT: x                 Radiology: all available radiological tests reviewed    Advanced directives addressed: full resuscitation

## 2019-06-10 NOTE — CONSULT NOTE ADULT - ASSESSMENT
70 y/o female with h/o CHF, Afib (on Xarelto), HTN, HLD, chronic venous insufficiency with dermatitis/cellulitis s/p multiple courses of abx therapy, DM type 2, Ulcerative colitis, thyroid nodule was admitted on 6/9 for worsening SOB x one day. She also has been c/o lumbar and thoracic pain. The night PTA, while sleeping, she developed chest tightness that was constant and lasted until the morning.    1. PVD with chronic venous insufficiency. Chronic LE stasis dermatitis.   -mild leukocytosis ?cause  -local legs wound care  -observe off abx for now  -old chart reviewed to assess prior cultures  -monitor temps  -f/u CBC  -supportive care  2. Other issues:   -care per medicine 68 y/o female with h/o CHF, Afib (on Xarelto), HTN, HLD, chronic venous insufficiency with dermatitis/cellulitis s/p multiple courses of abx therapy, DM type 2, Ulcerative colitis, thyroid nodule was admitted on 6/9 for worsening SOB x one day. She also has been c/o lumbar and thoracic pain. The night PTA, while sleeping, she developed chest tightness that was constant and lasted until the morning.    1. PVD with chronic venous insufficiency. Chronic LE stasis dermatitis. Chest pain syndrome.  -mild leukocytosis ?cause  -local legs wound care  -observe off abx for now  -old chart reviewed to assess prior cultures  -monitor temps  -f/u CBC  -supportive care  2. Other issues:   -care per medicine

## 2019-06-10 NOTE — ADVANCED PRACTICE NURSE CONSULT - RECOMMEDATIONS
1) Change dressing to LLE daily with collagenase, nonadhesive foam and kerlix  2) Encourage patient to elevate lower extremities while in bed

## 2019-06-10 NOTE — PROGRESS NOTE ADULT - SUBJECTIVE AND OBJECTIVE BOX
HPI: 70 y/o female with PMHx of CHF, Afib (on Xarelto), HTN, HLD, chronic venous insufficiency with dermatitis/cellulitis, DMII, Ulcerative colitis, and thyroid nodule presents to the ED c/o worsening SOB that started last night. She also has been c/o lumbar and thoracic pain which radiates. Last night while sleeping she developed chest tightness that was constant and lasted until the morning. She did have nausea with it but denies vomitting. She denies GERD or indigestion.     She was recently at [facility] and discharged 3 days ago. She has PVD and has had procedures to improve circulation in the RLE with  recently as 3 weeks ago. She was seen by him 3 days ago and has a follow up for RLE in 2 days. and recently Denies pain with movement, LE edema. Pt states her temperature was 95F yesterday, was discharged from rehab 3 days ago for cellulitis and ambulation, pt states she finished antibiotic treatment. Patient follows with Dr. Justo Mayer    6/10: MI ruled out, no cp/sob  WBC increased    PHYSICAL EXAM:    Daily     Daily Weight in k.9 (10 Isaias 2019 07:32)    ICU Vital Signs Last 24 Hrs  T(C): 36.3 (10 Isaias 2019 05:29), Max: 36.7 (2019 11:58)  T(F): 97.3 (10 Isaias 2019 05:29), Max: 98.1 (2019 11:58)  HR: 87 (10 Isaias 2019 05:29) (56 - 87)  BP: 166/83 (10 Isaias 2019 05:29) (121/56 - 166/83)  BP(mean): --  ABP: --  ABP(mean): --  RR: 20 (10 Isaias 2019 05:29) (16 - 20)  SpO2: 98% (10 Isaias 2019 05:29) (98% - 100%)      Constitutional: Well appearing  HEENT: Atraumatic, SHANICE, Normal, No congestion  Respiratory: Breath Sounds normal, no rhonchi/wheeze  Cardiovascular: N S1S2;   Gastrointestinal: Abdomen soft, non tender, Bowel Sounds present  Extremities: No edema, peripheral pulses present  Neurological: AAO x 3, no gross focal motor deficits  Skin: Non cellulitic, left leg ulcers  Lymph Nodes: No lymphadenopathy noted  Back: No CVA tenderness   Musculoskeletal: non tender  Breasts: Deferred  Genitourinary: deferred  Rectal: Deferred                          11.7   13.03 )-----------( 487      ( 10 Isaias 2019 07:14 )             37.1       CBC Full  -  ( 10 Isaias 2019 07:14 )  WBC Count : 13.03 K/uL  RBC Count : 4.00 M/uL  Hemoglobin : 11.7 g/dL  Hematocrit : 37.1 %  Platelet Count - Automated : 487 K/uL  Mean Cell Volume : 92.8 fl  Mean Cell Hemoglobin : 29.3 pg  Mean Cell Hemoglobin Concentration : 31.5 gm/dL  Auto Neutrophil # : 11.28 K/uL  Auto Lymphocyte # : 0.83 K/uL  Auto Monocyte # : 0.66 K/uL  Auto Eosinophil # : 0.07 K/uL  Auto Basophil # : 0.10 K/uL  Auto Neutrophil % : 86.5 %  Auto Lymphocyte % : 6.4 %  Auto Monocyte % : 5.1 %  Auto Eosinophil % : 0.5 %  Auto Basophil % : 0.8 %      0610    131<L>  |  96  |  11  ----------------------------<  148<H>  3.7   |  28  |  0.55    Ca    8.8      10 Isaias 2019 07:14    TPro  7.6  /  Alb  2.7<L>  /  TBili  0.3  /  DBili  x   /  AST  10<L>  /  ALT  20  /  AlkPhos  74  06-09      LIVER FUNCTIONS - ( 2019 09:47 )  Alb: 2.7 g/dL / Pro: 7.6 gm/dL / ALK PHOS: 74 U/L / ALT: 20 U/L / AST: 10 U/L / GGT: x             PT/INR - ( 2019 09:47 )   PT: 15.0 sec;   INR: 1.34 ratio         PTT - ( 2019 09:47 )  PTT:31.1 sec    CARDIAC MARKERS ( 2019 23:37 )  <0.015 ng/mL / x     / x     / x     / x      CARDIAC MARKERS ( 2019 12:25 )  <0.015 ng/mL / x     / x     / x     / x      CARDIAC MARKERS ( 2019 09:47 )  <0.015 ng/mL / x     / x     / x     / x                    MEDICATIONS  (STANDING):  atorvastatin 10 milliGRAM(s) Oral at bedtime  collagenase Ointment 1 Application(s) Topical daily  dextrose 5%. 1000 milliLiter(s) (50 mL/Hr) IV Continuous <Continuous>  dextrose 50% Injectable 12.5 Gram(s) IV Push once  dextrose 50% Injectable 25 Gram(s) IV Push once  dextrose 50% Injectable 25 Gram(s) IV Push once  dicyclomine 20 milliGRAM(s) Oral before breakfast  diltiazem    milliGRAM(s) Oral daily  docusate sodium 100 milliGRAM(s) Oral daily  furosemide    Tablet 40 milliGRAM(s) Oral daily  gabapentin 300 milliGRAM(s) Oral two times a day  insulin lispro (HumaLOG) corrective regimen sliding scale   SubCutaneous three times a day before meals  metoprolol succinate  milliGRAM(s) Oral daily  montelukast 10 milliGRAM(s) Oral at bedtime  potassium chloride    Tablet ER 20 milliEquivalent(s) Oral daily  rivaroxaban 20 milliGRAM(s) Oral every 24 hours

## 2019-06-10 NOTE — PROVIDER CONTACT NOTE (OTHER) - SITUATION
Pt. attempted to climb out of bed unassisted, bed alarmed, staff responded, pt. found to have new wound on Left lateral calf

## 2019-06-10 NOTE — CONSULT NOTE ADULT - ASSESSMENT
69 year old woman with the above history admitted with chest pain and shortness of breath.  No evidence for ACS.  No evidence for PE or aortic dissection.  HR controlled.  BP on the high side.  Will monitor.  To be seen by the wound service and by Gyn regarding her adnexal mass.  There was also a thyroid mass.  Patient should have an ultrasound of both the  pelvis and thyroid.

## 2019-06-11 LAB
GLUCOSE BLDC GLUCOMTR-MCNC: 131 MG/DL — HIGH (ref 70–99)
GLUCOSE BLDC GLUCOMTR-MCNC: 132 MG/DL — HIGH (ref 70–99)
GLUCOSE BLDC GLUCOMTR-MCNC: 163 MG/DL — HIGH (ref 70–99)
GLUCOSE BLDC GLUCOMTR-MCNC: 173 MG/DL — HIGH (ref 70–99)
HCT VFR BLD CALC: 37 % — SIGNIFICANT CHANGE UP (ref 34.5–45)
HGB BLD-MCNC: 11.9 G/DL — SIGNIFICANT CHANGE UP (ref 11.5–15.5)
MCHC RBC-ENTMCNC: 29.1 PG — SIGNIFICANT CHANGE UP (ref 27–34)
MCHC RBC-ENTMCNC: 32.2 GM/DL — SIGNIFICANT CHANGE UP (ref 32–36)
MCV RBC AUTO: 90.5 FL — SIGNIFICANT CHANGE UP (ref 80–100)
PLATELET # BLD AUTO: 467 K/UL — HIGH (ref 150–400)
RBC # BLD: 4.09 M/UL — SIGNIFICANT CHANGE UP (ref 3.8–5.2)
RBC # FLD: 14.5 % — SIGNIFICANT CHANGE UP (ref 10.3–14.5)
WBC # BLD: 11.52 K/UL — HIGH (ref 3.8–10.5)
WBC # FLD AUTO: 11.52 K/UL — HIGH (ref 3.8–10.5)

## 2019-06-11 PROCEDURE — 70496 CT ANGIOGRAPHY HEAD: CPT | Mod: 26

## 2019-06-11 PROCEDURE — 99221 1ST HOSP IP/OBS SF/LOW 40: CPT

## 2019-06-11 PROCEDURE — 70498 CT ANGIOGRAPHY NECK: CPT | Mod: 26

## 2019-06-11 RX ORDER — LOSARTAN POTASSIUM 100 MG/1
50 TABLET, FILM COATED ORAL DAILY
Refills: 0 | Status: DISCONTINUED | OUTPATIENT
Start: 2019-06-11 | End: 2019-06-12

## 2019-06-11 RX ORDER — HYDROMORPHONE HYDROCHLORIDE 2 MG/ML
0.5 INJECTION INTRAMUSCULAR; INTRAVENOUS; SUBCUTANEOUS ONCE
Refills: 0 | Status: DISCONTINUED | OUTPATIENT
Start: 2019-06-11 | End: 2019-06-11

## 2019-06-11 RX ORDER — HYDROMORPHONE HYDROCHLORIDE 2 MG/ML
1 INJECTION INTRAMUSCULAR; INTRAVENOUS; SUBCUTANEOUS ONCE
Refills: 0 | Status: DISCONTINUED | OUTPATIENT
Start: 2019-06-11 | End: 2019-06-11

## 2019-06-11 RX ORDER — LIDOCAINE 4 G/100G
1 CREAM TOPICAL DAILY
Refills: 0 | Status: DISCONTINUED | OUTPATIENT
Start: 2019-06-11 | End: 2019-06-13

## 2019-06-11 RX ADMIN — Medication 650 MILLIGRAM(S): at 09:03

## 2019-06-11 RX ADMIN — HYDROMORPHONE HYDROCHLORIDE 2 MILLIGRAM(S): 2 INJECTION INTRAMUSCULAR; INTRAVENOUS; SUBCUTANEOUS at 06:19

## 2019-06-11 RX ADMIN — MONTELUKAST 10 MILLIGRAM(S): 4 TABLET, CHEWABLE ORAL at 22:33

## 2019-06-11 RX ADMIN — HYDROMORPHONE HYDROCHLORIDE 1 MILLIGRAM(S): 2 INJECTION INTRAMUSCULAR; INTRAVENOUS; SUBCUTANEOUS at 00:49

## 2019-06-11 RX ADMIN — LOSARTAN POTASSIUM 50 MILLIGRAM(S): 100 TABLET, FILM COATED ORAL at 12:57

## 2019-06-11 RX ADMIN — HYDROMORPHONE HYDROCHLORIDE 0.5 MILLIGRAM(S): 2 INJECTION INTRAMUSCULAR; INTRAVENOUS; SUBCUTANEOUS at 02:47

## 2019-06-11 RX ADMIN — Medication 1: at 08:42

## 2019-06-11 RX ADMIN — Medication 100 MILLIGRAM(S): at 06:11

## 2019-06-11 RX ADMIN — Medication 1 APPLICATION(S): at 12:57

## 2019-06-11 RX ADMIN — Medication 40 MILLIGRAM(S): at 12:57

## 2019-06-11 RX ADMIN — Medication 100 MILLIGRAM(S): at 12:58

## 2019-06-11 RX ADMIN — LIDOCAINE 1 PATCH: 4 CREAM TOPICAL at 19:00

## 2019-06-11 RX ADMIN — HYDROMORPHONE HYDROCHLORIDE 0.5 MILLIGRAM(S): 2 INJECTION INTRAMUSCULAR; INTRAVENOUS; SUBCUTANEOUS at 10:13

## 2019-06-11 RX ADMIN — Medication 20 MILLIGRAM(S): at 06:11

## 2019-06-11 RX ADMIN — GABAPENTIN 300 MILLIGRAM(S): 400 CAPSULE ORAL at 06:11

## 2019-06-11 RX ADMIN — ATORVASTATIN CALCIUM 10 MILLIGRAM(S): 80 TABLET, FILM COATED ORAL at 22:33

## 2019-06-11 RX ADMIN — Medication 20 MILLIEQUIVALENT(S): at 12:57

## 2019-06-11 RX ADMIN — Medication 120 MILLIGRAM(S): at 06:11

## 2019-06-11 RX ADMIN — HYDROMORPHONE HYDROCHLORIDE 1 MILLIGRAM(S): 2 INJECTION INTRAMUSCULAR; INTRAVENOUS; SUBCUTANEOUS at 01:19

## 2019-06-11 RX ADMIN — RIVAROXABAN 20 MILLIGRAM(S): KIT at 18:00

## 2019-06-11 RX ADMIN — Medication 1: at 17:59

## 2019-06-11 RX ADMIN — Medication 60 MILLIGRAM(S): at 14:05

## 2019-06-11 RX ADMIN — GABAPENTIN 300 MILLIGRAM(S): 400 CAPSULE ORAL at 18:00

## 2019-06-11 RX ADMIN — LIDOCAINE 1 PATCH: 4 CREAM TOPICAL at 15:30

## 2019-06-11 RX ADMIN — HYDROMORPHONE HYDROCHLORIDE 0.5 MILLIGRAM(S): 2 INJECTION INTRAMUSCULAR; INTRAVENOUS; SUBCUTANEOUS at 02:17

## 2019-06-11 NOTE — PROGRESS NOTE ADULT - ASSESSMENT
69 year old woman with the above history admitted with chest pain and shortness of breath.  No evidence for ACS.  No evidence for PE or aortic dissection.  HR controlled.  BP on the high side.  Will monitor.  To be seen by the wound service and by Gyn regarding her adnexal mass.  There was also a thyroid mass.  Patient should have an ultrasound of both the  pelvis and thyroid.    6/11/19:  Bp high at times.  ? related to pain.  Will continue to monitor.  Consider adding an ARB.

## 2019-06-11 NOTE — CONSULT NOTE ADULT - ASSESSMENT
68 y/o female with PMHx of CHF, Afib (on Xarelto), HTN, HLD, chronic venous insufficiency with dermatitis/cellulitis, DMII, Ulcerative colitis, and thyroid nodule presented to  with worsening SOB, cardiac w/u done during admission. A code stroke was called for new right facial droop, right perioral numbness.    A/P: ?Bell's palsy vs TIA  - Patient is not a candidiate for tpa, on Xarelto for Afib  - HCT/ CTA head/neck r/o hemorrhage or occlusion  - Continue ASA  - Statin  - Dysphagia screen  - DVT prophylaxia  - MRI Brain w/o gado  - PT eval  - Speech/swallow eval    Discussed with Dr. Ca

## 2019-06-11 NOTE — CONSULT NOTE ADULT - SUBJECTIVE AND OBJECTIVE BOX
HPI:  68 y/o female with PMHx of CHF, Afib (on Xarelto), HTN, HLD, chronic venous insufficiency with dermatitis/cellulitis, DMII, Ulcerative colitis, and thyroid nodule presented to  with worsening SOB. She was recently at [facility] and discharged 3 days ago. She has PVD and has had procedures to improve circulation in the RLE with  recently as 3 weeks ago. She was seen by him 3 days ago and has a follow up for RLE in 2 days. A code stroke was called at 9:50am for new onset right facial droop, R perioral numbness and slurred speech. Per RN patient was in her normal state of health this morning and called RN for R perioral numbness when RN noticed facial droop. On examination she reports numb/ tingling R side of face is improving however still reports some slurring of her speech. She denies HA, + nausea, denies vomiting, or weakness of arms/legs.   NIHSS: 3    PAST MEDICAL & SURGICAL HISTORY:  Cellulitis  HTN (hypertension)  Thyroid nodule  Peripheral venous insufficiency  Neuropathy  Morbid obesity with BMI of 40.0-44.9, adult  Dyspnea  Congestive heart failure  Atrial fibrillation  Diabetes mellitus type II, controlled  Status post medial meniscus repair  S/P left knee arthroscopy  Other complications of gastric band procedure  H/O colonoscopy  History of esophagogastroduodenoscopy (EGD)      FAMILY HISTORY:  Family history of diabetes mellitus in mother  Family history of breast cancer in mother        Social Hx:  Nonsmoker, no drug or alcohol use    MEDICATIONS  (STANDING):  atorvastatin 10 milliGRAM(s) Oral at bedtime  collagenase Ointment 1 Application(s) Topical daily  dextrose 5%. 1000 milliLiter(s) (50 mL/Hr) IV Continuous <Continuous>  dextrose 50% Injectable 12.5 Gram(s) IV Push once  dextrose 50% Injectable 25 Gram(s) IV Push once  dextrose 50% Injectable 25 Gram(s) IV Push once  dicyclomine 20 milliGRAM(s) Oral before breakfast  diltiazem    milliGRAM(s) Oral daily  docusate sodium 100 milliGRAM(s) Oral daily  furosemide    Tablet 40 milliGRAM(s) Oral daily  gabapentin 300 milliGRAM(s) Oral two times a day  HYDROmorphone  Injectable 0.5 milliGRAM(s) IV Push once  insulin lispro (HumaLOG) corrective regimen sliding scale   SubCutaneous three times a day before meals  losartan 50 milliGRAM(s) Oral daily  metoprolol succinate  milliGRAM(s) Oral daily  montelukast 10 milliGRAM(s) Oral at bedtime  potassium chloride    Tablet ER 20 milliEquivalent(s) Oral daily  rivaroxaban 20 milliGRAM(s) Oral every 24 hours       Allergies  penicillin (Hives)  Intolerances    ROS: Pertinent positives in HPI, all other ROS were reviewed and are negative.      Vital Signs Last 24 Hrs  T(C): 36.3 (11 Jun 2019 05:19), Max: 37.1 (10 Isaias 2019 16:18)  T(F): 97.3 (11 Jun 2019 05:19), Max: 98.7 (10 Isaias 2019 16:18)  HR: 82 (11 Jun 2019 05:19) (82 - 99)  BP: 185/88 (11 Jun 2019 05:19) (112/95 - 185/88)  BP(mean): --  RR: 18 (11 Jun 2019 05:19) (18 - 18)  SpO2: 95% (11 Jun 2019 05:19) (95% - 100%)      PHYSICAL EXAM:  Constitutional: awake and alert, obese  HEENT: PERRLA, EOMI  Neck: Supple  Respiratory: Breath sounds are clear bilaterally  Cardiovascular: S1 and S2, irregular rhythm  Gastrointestinal: soft, nontender  Extremities:  no edema  Vascular: Caritid Bruit - no  Musculoskeletal: no joint swelling/tenderness, no abnormal movements  Skin: No rashes    Neurological exam:  HF: A x O x 3. Appropriately interactive, normal affect. Mild dysarthria. No Aphasia or paraphasic errors. Naming /repetition intact   CN: JORDAN, EOMI, VFF, facial sensation normal, no NLFD, tongue midline, Palate moves equally, SCM equal bilaterally  Motor: No pronator drift, Strength 5/5 except R LE lifts antigravity, limited ROM 2/2 pain  Sens: Intact to light touch  Reflexes: Symmetric and normal, downgoing toes b/l  Coord:  No FNFA, dysmetria, MERCEDES intact   Gait/Balance: Cannot test    NIHSS: 3          Labs:                        11.9   11.52 )-----------( 467      ( 11 Jun 2019 06:43 )             37.0     06-10    131<L>  |  96  |  11  ----------------------------<  148<H>  3.7   |  28  |  0.55    Ca    8.8      10 Isaias 2019 07:14

## 2019-06-11 NOTE — PROGRESS NOTE ADULT - SUBJECTIVE AND OBJECTIVE BOX
HPI: 68 y/o female with PMHx of CHF, Afib (on Xarelto), HTN, HLD, chronic venous insufficiency with dermatitis/cellulitis, DMII, Ulcerative colitis, and thyroid nodule presents to the ED c/o worsening SOB that started last night. She also has been c/o lumbar and thoracic pain which radiates. Last night while sleeping she developed chest tightness that was constant and lasted until the morning. She did have nausea with it but denies vomitting. She denies GERD or indigestion.     She was recently at [facility] and discharged 3 days ago. She has PVD and has had procedures to improve circulation in the RLE with  recently as 3 weeks ago. She was seen by him 3 days ago and has a follow up for RLE in 2 days. and recently Denies pain with movement, LE edema. Pt states her temperature was 95F yesterday, was discharged from rehab 3 days ago for cellulitis and ambulation, pt states she finished antibiotic treatment. Patient follows with Dr. Justo Mayer    6/10: MI ruled out, no cp/sob  WBC increased    6/11: code stroke called in for right facial droop and asymmetry  BP elevated 185/88        PHYSICAL EXAM:    Vital Signs Last 24 Hrs  T(C): 36.3 (11 Jun 2019 05:19), Max: 37.1 (10 Isaias 2019 16:18)  T(F): 97.3 (11 Jun 2019 05:19), Max: 98.7 (10 Isaias 2019 16:18)  HR: 82 (11 Jun 2019 05:19) (82 - 99)  BP: 185/88 (11 Jun 2019 05:19) (112/95 - 185/88)  BP(mean): --  RR: 18 (11 Jun 2019 05:19) (18 - 18)  SpO2: 95% (11 Jun 2019 05:19) (95% - 100%)    Constitutional: Well appearing  HEENT: Atraumatic, SHANICE, Normal, No congestion  Respiratory: Breath Sounds normal, no rhonchi/wheeze  Cardiovascular: N S1S2;   Gastrointestinal: Abdomen soft, non tender, Bowel Sounds present  Extremities: No edema, peripheral pulses present  Neurological: AAO x 3, right facial droop  Skin: Non cellulitic, left leg ulcers  Lymph Nodes: No lymphadenopathy noted  Back: No CVA tenderness   Musculoskeletal: non tender  Breasts: Deferred  Genitourinary: deferred  Rectal: Deferred    Lab Results:  CBC  CBC Full  -  ( 11 Jun 2019 06:43 )  WBC Count : 11.52 K/uL  RBC Count : 4.09 M/uL  Hemoglobin : 11.9 g/dL  Hematocrit : 37.0 %  Platelet Count - Automated : 467 K/uL  Mean Cell Volume : 90.5 fl  Mean Cell Hemoglobin : 29.1 pg  Mean Cell Hemoglobin Concentration : 32.2 gm/dL  Auto Neutrophil # : x  Auto Lymphocyte # : x  Auto Monocyte # : x  Auto Eosinophil # : x  Auto Basophil # : x  Auto Neutrophil % : x  Auto Lymphocyte % : x  Auto Monocyte % : x  Auto Eosinophil % : x  Auto Basophil % : x    .		Differential:	[] Automated		[] Manual  Chemistry                        11.9   11.52 )-----------( 467      ( 11 Jun 2019 06:43 )             37.0     06-10    131<L>  |  96  |  11  ----------------------------<  148<H>  3.7   |  28  |  0.55    Ca    8.8      10 Isaias 2019 07:14                  MICROBIOLOGY/CULTURES:      RADIOLOGY RESULTS:                       MEDICATIONS  (STANDING):  atorvastatin 10 milliGRAM(s) Oral at bedtime  collagenase Ointment 1 Application(s) Topical daily  dextrose 5%. 1000 milliLiter(s) (50 mL/Hr) IV Continuous <Continuous>  dextrose 50% Injectable 12.5 Gram(s) IV Push once  dextrose 50% Injectable 25 Gram(s) IV Push once  dextrose 50% Injectable 25 Gram(s) IV Push once  dicyclomine 20 milliGRAM(s) Oral before breakfast  diltiazem    milliGRAM(s) Oral daily  docusate sodium 100 milliGRAM(s) Oral daily  furosemide    Tablet 40 milliGRAM(s) Oral daily  gabapentin 300 milliGRAM(s) Oral two times a day  insulin lispro (HumaLOG) corrective regimen sliding scale   SubCutaneous three times a day before meals  metoprolol succinate  milliGRAM(s) Oral daily  montelukast 10 milliGRAM(s) Oral at bedtime  potassium chloride    Tablet ER 20 milliEquivalent(s) Oral daily  rivaroxaban 20 milliGRAM(s) Oral every 24 hours HPI: 70 y/o female with PMHx of CHF, Afib (on Xarelto), HTN, HLD, chronic venous insufficiency with dermatitis/cellulitis, DMII, Ulcerative colitis, and thyroid nodule presents to the ED c/o worsening SOB that started last night. She also has been c/o lumbar and thoracic pain which radiates. Last night while sleeping she developed chest tightness that was constant and lasted until the morning. She did have nausea with it but denies vomitting. She denies GERD or indigestion.     She was recently at [facility] and discharged 3 days ago. She has PVD and has had procedures to improve circulation in the RLE with  recently as 3 weeks ago. She was seen by him 3 days ago and has a follow up for RLE in 2 days. and recently Denies pain with movement, LE edema. Pt states her temperature was 95F yesterday, was discharged from rehab 3 days ago for cellulitis and ambulation, pt states she finished antibiotic treatment. Patient follows with Dr. Justo Mayer    6/10: MI ruled out, no cp/sob  WBC increased    6/11: code stroke called in for right facial droop and asymmetry  BP elevated 185/88        PHYSICAL EXAM:    Vital Signs Last 24 Hrs  T(C): 36.3 (11 Jun 2019 05:19), Max: 37.1 (10 Isaias 2019 16:18)  T(F): 97.3 (11 Jun 2019 05:19), Max: 98.7 (10 Isaias 2019 16:18)  HR: 82 (11 Jun 2019 05:19) (82 - 99)  BP: 185/88 (11 Jun 2019 05:19) (112/95 - 185/88)  BP(mean): --  RR: 18 (11 Jun 2019 05:19) (18 - 18)  SpO2: 95% (11 Jun 2019 05:19) (95% - 100%)    Constitutional: Well appearing  HEENT: Atraumatic, SHANICE, Normal, No congestion  Respiratory: Breath Sounds normal, no rhonchi/wheeze  Cardiovascular: N S1S2;   Gastrointestinal: Abdomen soft, non tender, Bowel Sounds present  Extremities: No edema, peripheral pulses present  Neurological: AAO x 3, right facial droop  Skin: Non cellulitic, left leg ulcers  Lymph Nodes: No lymphadenopathy noted  Back: No CVA tenderness   Musculoskeletal: non tender  Breasts: Deferred  Genitourinary: deferred  Rectal: Deferred    Lab Results:  CBC  CBC Full  -  ( 11 Jun 2019 06:43 )  WBC Count : 11.52 K/uL  RBC Count : 4.09 M/uL  Hemoglobin : 11.9 g/dL  Hematocrit : 37.0 %  Platelet Count - Automated : 467 K/uL  Mean Cell Volume : 90.5 fl  Mean Cell Hemoglobin : 29.1 pg  Mean Cell Hemoglobin Concentration : 32.2 gm/dL  Auto Neutrophil # : x  Auto Lymphocyte # : x  Auto Monocyte # : x  Auto Eosinophil # : x  Auto Basophil # : x  Auto Neutrophil % : x  Auto Lymphocyte % : x  Auto Monocyte % : x  Auto Eosinophil % : x  Auto Basophil % : x    .		Differential:	[] Automated		[] Manual  Chemistry                        11.9   11.52 )-----------( 467      ( 11 Jun 2019 06:43 )             37.0     06-10    131<L>  |  96  |  11  ----------------------------<  148<H>  3.7   |  28  |  0.55    Ca    8.8      10 Isaias 2019 07:14      < from: US Thyroid + Parathyroid (06.10.19 @ 16:30) >  IMPRESSION:     Solid nodule within the mid to lower pole of the right lobe of the   thyroid measuring approximately 2.6 x 2.0 x 1.7 cm. Complex cyst with   minimal vascularity in the midpole of the left lobe of the thyroid   measuring 1.6 x 1.3 x 1.5 cm. Further evaluation is recommended.    < end of copied text >              MEDICATIONS  (STANDING):  atorvastatin 10 milliGRAM(s) Oral at bedtime  collagenase Ointment 1 Application(s) Topical daily  dextrose 5%. 1000 milliLiter(s) (50 mL/Hr) IV Continuous <Continuous>  dextrose 50% Injectable 12.5 Gram(s) IV Push once  dextrose 50% Injectable 25 Gram(s) IV Push once  dextrose 50% Injectable 25 Gram(s) IV Push once  dicyclomine 20 milliGRAM(s) Oral before breakfast  diltiazem    milliGRAM(s) Oral daily  docusate sodium 100 milliGRAM(s) Oral daily  furosemide    Tablet 40 milliGRAM(s) Oral daily  gabapentin 300 milliGRAM(s) Oral two times a day  insulin lispro (HumaLOG) corrective regimen sliding scale   SubCutaneous three times a day before meals  metoprolol succinate  milliGRAM(s) Oral daily  montelukast 10 milliGRAM(s) Oral at bedtime  potassium chloride    Tablet ER 20 milliEquivalent(s) Oral daily  rivaroxaban 20 milliGRAM(s) Oral every 24 hours

## 2019-06-11 NOTE — PROGRESS NOTE ADULT - SUBJECTIVE AND OBJECTIVE BOX
CHIEF COMPLAINT: Patient is a 69y old  Female who presents with a chief complaint of SOB, CP (09 Jun 2019 15:10)      HPI: HPI:  68 y/o female with PMHx of CHF, Afib (on Xarelto), HTN, HLD, chronic venous insufficiency with dermatitis/cellulitis, DMII, Ulcerative colitis, and thyroid nodule presents to the ED c/o worsening SOB that started last night. She also has been c/o lumbar and thoracic pain which radiates. Last night while sleeping she developed chest tightness that was constant and lasted until the morning. She did have nausea with it but denies vomitting. She denies GERD or indigestion.     She was recently at [facility] and discharged 3 days ago. She has PVD and has had procedures to improve circulation in the RLE with  recently as 3 weeks ago. She was seen by him 3 days ago and has a follow up for RLE in 2 days. and recently Denies pain with movement, LE edema. Pt states her temperature was 95F yesterday, was discharged from rehab 3 days ago for cellulitis and ambulation, pt states she finished antibiotic treatment. Patient follows with Dr. Justo Mayer. (09 Jun 2019 15:10)      PMHx: PAST MEDICAL & SURGICAL HISTORY:  Cellulitis  HTN (hypertension)  Thyroid nodule  Peripheral venous insufficiency  Neuropathy  Morbid obesity with BMI of 40.0-44.9, adult  Dyspnea  Congestive heart failure  Atrial fibrillation  Diabetes mellitus type II, controlled  Status post medial meniscus repair  S/P left knee arthroscopy  Other complications of gastric band procedure  H/O colonoscopy  History of esophagogastroduodenoscopy (EGD)        Soc Hx:     FAMILY HISTORY:  Family history of diabetes mellitus in mother  Family history of breast cancer in mother      Allergies: Allergies    penicillin (Hives)    Intolerances          REVIEW OF SYSTEMS:    As above  + chest pain and shortness of breath  No lightheadeness or syncope  + leg swelling  No palpitations  No claudication-like symptoms    ICU Vital Signs Last 24 Hrs  T(C): 36.3 (11 Jun 2019 05:19), Max: 37.1 (10 Isaias 2019 16:18)  T(F): 97.3 (11 Jun 2019 05:19), Max: 98.7 (10 Isaias 2019 16:18)  HR: 82 (11 Jun 2019 05:19) (69 - 99)  BP: 185/88 (11 Jun 2019 05:19) (112/95 - 185/88)  BP(mean): --  ABP: --  ABP(mean): --  RR: 18 (11 Jun 2019 05:19) (18 - 18)  SpO2: 95% (11 Jun 2019 05:19) (95% - 100%)      I&O's Summary    09 Jun 2019 07:01  -  10 Isaias 2019 07:00  --------------------------------------------------------  IN: 550 mL / OUT: 0 mL / NET: 550 mL          CAPILLARY BLOOD GLUCOSE      POCT Blood Glucose.: 148 mg/dL (09 Jun 2019 16:02)      PHYSICAL EXAM:   Patient in NAD  Neck: No JVD; Carotids:  2+ without bruits  Respiratory:  Clear to A&P  Cardiovascular: S1 and S2, irregular rate and rhythm, no Murmurs, gallops or rubs  Gastrointestinal:  Soft, non-tender; BS positive  Extremities: + chronic bilateral edema/venous stasis changes  Neurological: A/O x 3, no focal deficits      MEDICATIONS:  MEDICATIONS  (STANDING):  atorvastatin 10 milliGRAM(s) Oral at bedtime  collagenase Ointment 1 Application(s) Topical daily  dextrose 5%. 1000 milliLiter(s) (50 mL/Hr) IV Continuous <Continuous>  dextrose 50% Injectable 12.5 Gram(s) IV Push once  dextrose 50% Injectable 25 Gram(s) IV Push once  dextrose 50% Injectable 25 Gram(s) IV Push once  dicyclomine 20 milliGRAM(s) Oral before breakfast  diltiazem    milliGRAM(s) Oral daily  docusate sodium 100 milliGRAM(s) Oral daily  furosemide    Tablet 40 milliGRAM(s) Oral daily  gabapentin 300 milliGRAM(s) Oral two times a day  insulin lispro (HumaLOG) corrective regimen sliding scale   SubCutaneous three times a day before meals  metoprolol succinate  milliGRAM(s) Oral daily  montelukast 10 milliGRAM(s) Oral at bedtime  potassium chloride    Tablet ER 20 milliEquivalent(s) Oral daily  rivaroxaban 20 milliGRAM(s) Oral every 24 hours    Home Medications:  collagenase 250 units/g topical ointment: 1 application topically once a day (23 May 2019 10:54)  dicyclomine 20 mg oral tablet: 1 tab(s) orally once a day (before a meal) (23 May 2019 10:54)  dilTIAZem 120 mg oral tablet: 1 tab(s) orally once a day (23 May 2019 10:54)  docusate sodium 100 mg oral capsule: 1 cap(s) orally once a day (23 May 2019 10:54)  gabapentin 300 mg oral capsule: 1 cap(s) orally 3 times a day (23 May 2019 10:54)  HYDROmorphone 2 mg oral tablet: 1 tab(s) orally every 4 hours, As needed, Moderate Pain (4 - 6) (23 May 2019 10:54)  HYDROmorphone 4 mg oral tablet: 1 tab(s) orally every 6 hours, As Needed (23 May 2019 10:54)  Lasix 40 mg oral tablet: 1 tab(s) orally once a day (16 Apr 2019 20:54)  metFORMIN 1000 mg oral tablet: 1 tab(s) orally 2 times a day (16 Apr 2019 20:54)  Metoprolol Succinate  mg oral tablet, extended release: 1 tab(s) orally once a day (16 Apr 2019 20:54)  montelukast 10 mg oral tablet: 1 tab(s) orally once a day (at bedtime) (16 Apr 2019 20:54)  potassium chloride 20 mEq oral tablet, extended release: 1 tab(s) orally once a day (23 May 2019 10:54)  pravastatin 10 mg oral tablet: 1 tab(s) orally once a day (16 Apr 2019 20:54)  rivaroxaban 20 mg oral tablet: 1 tab(s) orally every 24 hours (23 May 2019 10:54)        LABS: All Labs Reviewed:  Blood Culture:   BNP Serum Pro-Brain Natriuretic Peptide: 892 pg/mL (06-09 @ 09:47)    CBC             WBC Count: 10.78 K/uL (06-09 @ 09:47)              Hemoglobin: 12.1 g/dL (06-09 @ 09:47)              Hematocrit: 38.2 % (06-09 @ 09:47)              Mean Cell Volume: 93.6 fl (06-09 @ 09:47)              Platelet Count - Automated: 529 K/uL (06-09 @ 09:47)                            Cardiac markers             Troponin I, Serum: <0.015 ng/mL (06-09 @ 23:37)  Troponin I, Serum: <0.015 ng/mL (06-09 @ 12:25)  Troponin I, Serum: <0.015 ng/mL (06-09 @ 09:47)                             Chems        Sodium, Serum: 138 mmol/L (06-09 @ 09:47)          Potassium, Serum: 3.6 mmol/L (06-09 @ 09:47)          Blood Urea Nitrogen, Serum: 16 mg/dL (06-09 @ 09:47)          Creatinine 0.73                  Protein Total, Serum: 7.6 gm/dL (06-09 @ 09:47)                  Calcium, Total Serum: 9.1 mg/dL (06-09 @ 09:47)                  Bilirubin Total, Serum: 0.3 mg/dL (06-09 @ 09:47)          Alanine Aminotransferase (ALT/SGPT): 20 U/L (06-09 @ 09:47)          Aspartate Aminotransferase (AST/SGOT): 10 U/L (06-09 @ 09:47)                 INR: 1.34 ratio (06-09 @ 09:47)             RADIOLOGY:  < from: CT Angio Abdomen and Pelvis w/ IV Cont (06.09.19 @ 11:20) >  IMPRESSION:   No intramural hematoma or aortic dissection.    Right adnexalcyst, measuring 4.3 cm, which further evaluation with   pelvic ultrasound is recommended.    < end of copied text >      EKG:  Atrial fibrillation    Telemetry:  Atrial fibrillation    ECHO:

## 2019-06-11 NOTE — CONSULT NOTE ADULT - SUBJECTIVE AND OBJECTIVE BOX
68 y/o female with PMHx of CHF, Afib (on Xarelto), HTN, HLD, chronic venous insufficiency with dermatitis/cellulitis, DMII, Ulcerative colitis, and thyroid nodule presents to the ED c/o worsening SOB that started one day prior to admission. She also has been c/o lumbar and thoracic pain which radiates.  She was recently at [facility] and discharged 3 days ago. She has PVD  and multiple bilateral lower extremity venous ulcers.  She denies drainage or tenderness in bilateral legs    PAST MEDICAL & SURGICAL HISTORY:  Cellulitis  HTN (hypertension)  Thyroid nodule  Peripheral venous insufficiency  Neuropathy  Morbid obesity with BMI of 40.0-44.9, adult  Dyspnea  Congestive heart failure  Atrial fibrillation  Diabetes mellitus type II, controlled  Status post medial meniscus repair  S/P left knee arthroscopy  Other complications of gastric band procedure  H/O colonoscopy  History of esophagogastroduodenoscopy (EGD)      MEDICATIONS  (STANDING):  atorvastatin 10 milliGRAM(s) Oral at bedtime  collagenase Ointment 1 Application(s) Topical daily  dextrose 5%. 1000 milliLiter(s) (50 mL/Hr) IV Continuous <Continuous>  dextrose 50% Injectable 12.5 Gram(s) IV Push once  dextrose 50% Injectable 25 Gram(s) IV Push once  dextrose 50% Injectable 25 Gram(s) IV Push once  dicyclomine 20 milliGRAM(s) Oral before breakfast  diltiazem    milliGRAM(s) Oral daily  docusate sodium 100 milliGRAM(s) Oral daily  furosemide    Tablet 40 milliGRAM(s) Oral daily  gabapentin 300 milliGRAM(s) Oral two times a day  HYDROmorphone  Injectable 0.5 milliGRAM(s) IV Push once  insulin lispro (HumaLOG) corrective regimen sliding scale   SubCutaneous three times a day before meals  losartan 50 milliGRAM(s) Oral daily  metoprolol succinate  milliGRAM(s) Oral daily  montelukast 10 milliGRAM(s) Oral at bedtime  potassium chloride    Tablet ER 20 milliEquivalent(s) Oral daily  rivaroxaban 20 milliGRAM(s) Oral every 24 hours    MEDICATIONS  (PRN):  acetaminophen   Tablet .. 650 milliGRAM(s) Oral every 6 hours PRN Mild Pain (1 - 3)  dextrose 40% Gel 15 Gram(s) Oral once PRN Blood Glucose LESS THAN 70 milliGRAM(s)/deciliter  glucagon  Injectable 1 milliGRAM(s) IntraMuscular once PRN Glucose LESS THAN 70 milligrams/deciliter  HYDROmorphone   Tablet 2 milliGRAM(s) Oral every 4 hours PRN Moderate Pain (4 - 6)  ondansetron Injectable 4 milliGRAM(s) IV Push every 6 hours PRN Nausea      Allergies    penicillin (Hives)    Intolerances        SOCIAL HISTORY:    FAMILY HISTORY:  Family history of diabetes mellitus in mother  Family history of breast cancer in mother      REVIEW OF SYSTEMS      General:	    Skin/Breast:  	  Ophthalmologic:  	  ENMT:	    Respiratory and Thorax:  	  Cardiovascular:	    Gastrointestinal:	    Genitourinary:	    Musculoskeletal:	    Neurological:	    Psychiatric:	    Hematology/Lymphatics:	    Endocrine:	    Allergic/Immunologic:	    Vital Signs Last 24 Hrs  T(C): 36.6 (11 Jun 2019 10:53), Max: 37.1 (10 Isaias 2019 16:18)  T(F): 97.9 (11 Jun 2019 10:53), Max: 98.7 (10 Isaias 2019 16:18)  HR: 81 (11 Jun 2019 10:53) (81 - 99)  BP: 139/82 (11 Jun 2019 10:53) (112/95 - 185/88)  BP(mean): --  RR: 19 (11 Jun 2019 10:53) (18 - 19)  SpO2: 99% (11 Jun 2019 10:53) (95% - 100%)    I&O's Summary      Physical Exam:  General: NAD, resting comfortably  HEENT: NC/AT, EOMI, normal hearing, no carotid bruits  Pulmonary: normal resp effort, CTA-B  Cardiovascular: NSR  Abdominal: soft, NT/ND, no organomegaly  Extremities: WWP, normal strength, no clubbing/cyanosis/edema  Neuro: A/O x 3, CNs II-XII grossly intact, normal sensation, no focal deficits  Pulses non palpable. Feet warm with brisk capillary refill  Well healing bilaterla lower extremity venous ulcers, no overlying infection      LABS:                        11.9   11.52 )-----------( 467      ( 11 Jun 2019 06:43 )             37.0     06-10    131<L>  |  96  |  11  ----------------------------<  148<H>  3.7   |  28  |  0.55    Ca    8.8      10 Isaias 2019 07:14

## 2019-06-11 NOTE — CONSULT NOTE ADULT - ASSESSMENT
69 year old female with bilateral lower extremity venous ulcers now admitted with chest and back pain.  CTA does not reveal aortic dissection.  She developed facial droop this morning, likely LMN rather than stroke. CTA reveals ipsilateral moderate degree ICA stenosis. Brain CT shows no acute stroke. Does not require intervention at this time.  -Continue therapeutic AC for her atrial fibrillation  -Please apply compression dressings with curlex and ace bandages to bilateral lower legs daily  -Will follow

## 2019-06-11 NOTE — PROGRESS NOTE ADULT - ASSESSMENT
68 y/o female with PMHx of CHF, Afib (on Xarelto), HTN, HLD, chronic venous insufficiency with dermatitis/cellulitis, DMII, Ulcerative colitis, and thyroid nodule ; admitted with       # Mid sternal chest pain, nausea r/o ACS, differential GERD/Indigestion, radiating from muscular back pain: MI ruled out  # Lumbar and thoracic back pain likely muscle strain  # Adnexal Cyst on CT abd/pelvis  # CHF, baseline and compensated at this time  # Afib on Xarelto, UC, HTN, HLD, DM  # Right facial droop, r/o CVA    Plan:   - Troponins are negative, unlikely ACS, Dr.Klepper funes in AM appreciated;  No MI; tele rate controlled A fib; d/c tele, transfer to reg med floor  - continue Statin, Xarelto, Toprol, Diltiazem, ASA 81mg,   - home medication Uceris to be verified by pharmacy, pt can take her own  - b/l LE chronic venous changes seem to be baseline at this time, wound nurse shantel, out pt Dr. Yoo follow up as scheduled   - obgyn follow up for the adnexal Cyst as US transvaginal unable to visualize the ovaries  - Xray of the lumbar and thoracic area, lidocaine patch for pain control  - ID consult for leukocytosis with ulcers appreciated; monitor off ABX  - added losartan for optimal BP control  - awaiting throid US  - awaiting GYN consult  - CT head, CTA head and neck to r/o CVA; not a tPA candidate as on Xarelto  - DVT ppx: xarelto  - Sliding scale    poc discussed with pt, team, Dr. Ca    put back on tele x 24 hrs 70 y/o female with PMHx of CHF, Afib (on Xarelto), HTN, HLD, chronic venous insufficiency with dermatitis/cellulitis, DMII, Ulcerative colitis, and thyroid nodule ; admitted with       # Mid sternal chest pain, nausea r/o ACS, differential GERD/Indigestion, radiating from muscular back pain: MI ruled out  # Lumbar and thoracic back pain likely muscle strain  # Adnexal Cyst on CT abd/pelvis  # CHF, baseline and compensated at this time  # Afib on Xarelto, UC, HTN, HLD, DM  # Right facial droop, r/o CVA vs [place] Palsy vs Lyme's disease    Plan:   - Troponins are negative, unlikely ACS, Dr. Leyla funes in AM appreciated;  No MI; tele rate controlled A fib; d/c tele, transfer to reg med floor  - continue Statin, Xarelto, Toprol, Diltiazem, ASA 81mg,   - home medication Uceris to be verified by pharmacy, pt can take her own  - b/l LE chronic venous changes seem to be baseline at this time, wound nurse shantel, out pt Dr. Yoo follow up as scheduled   - ob/gyn follow up for the adnexal Cyst as US transvaginal unable to visualize the ovaries  - Xray of the lumbar and thoracic area, lidocaine patch for pain control  - ID consult for leukocytosis with ulcers appreciated; monitor off ABX  - added losartan for optimal BP control  - awaiting thyroid US  - awaiting GYN consult  - CT head, CTA head and neck to r/o CVA; not a tPA candidate as on Xarelto; CT head negative; Left ICA 50 stenosis  - Likely Bell's Palsy; started prednisone, no valtrex as per ID; Lyme Ab  - DVT ppx: xarelto  - Sliding scale    poc discussed with pt, team, Dr. Ca, Dr. Hoyos      put back on tele x 24 hrs 68 y/o female with PMHx of CHF, Afib (on Xarelto), HTN, HLD, chronic venous insufficiency with dermatitis/cellulitis, DMII, Ulcerative colitis, and thyroid nodule ; admitted with       # Mid sternal chest pain, nausea r/o ACS, differential GERD/Indigestion, radiating from muscular back pain: MI ruled out  # Lumbar and thoracic back pain likely muscle strain  # Adnexal Cyst on CT abd/pelvis  # CHF, baseline and compensated at this time  # Afib on Xarelto, UC, HTN, HLD, DM  # Right facial droop, r/o CVA vs [place] Palsy vs Lyme's disease    Plan:   - Troponins are negative, unlikely ACS, Dr. Leyla funes in AM appreciated;  No MI; tele rate controlled A fib; d/c tele, transfer to reg med floor  - continue Statin, Xarelto, Toprol, Diltiazem, ASA 81mg,   - home medication Uceris to be verified by pharmacy, pt can take her own  - b/l LE chronic venous changes seem to be baseline at this time, wound nurse shantel, out pt Dr. Yoo follow up as scheduled   - ob/gyn follow up for the adnexal Cyst as US transvaginal unable to visualize the ovaries  - Xray of the lumbar and thoracic area, lidocaine patch for pain control  - ID consult for leukocytosis with ulcers appreciated; monitor off ABX  - added losartan for optimal BP control  - Noted thyroid US; complex cyst + nodule  - awaiting GYN consult  - CT head, CTA head and neck to r/o CVA; not a tPA candidate as on Xarelto; CT head negative; Left ICA 50% stenosis  - Likely Bell's Palsy; started prednisone, no valtrex as per ID; Lyme Ab  - DVT ppx: xarelto  - Sliding scale    poc discussed with pt, team, Dr. Ca, Dr. Hoyos      put back on tele x 24 hrs

## 2019-06-12 ENCOUNTER — APPOINTMENT (OUTPATIENT)
Dept: VASCULAR SURGERY | Facility: CLINIC | Age: 69
End: 2019-06-12

## 2019-06-12 LAB
B BURGDOR C6 AB SER-ACNC: NEGATIVE — SIGNIFICANT CHANGE UP
B BURGDOR IGG+IGM SER-ACNC: 0.07 INDEX — SIGNIFICANT CHANGE UP (ref 0.01–0.89)
GLUCOSE BLDC GLUCOMTR-MCNC: 147 MG/DL — HIGH (ref 70–99)
GLUCOSE BLDC GLUCOMTR-MCNC: 151 MG/DL — HIGH (ref 70–99)
GLUCOSE BLDC GLUCOMTR-MCNC: 192 MG/DL — HIGH (ref 70–99)
GLUCOSE BLDC GLUCOMTR-MCNC: 194 MG/DL — HIGH (ref 70–99)
LYME C6 AB IGG/IGM EIA REFLEX WESTERN BL: SIGNIFICANT CHANGE UP

## 2019-06-12 PROCEDURE — 99233 SBSQ HOSP IP/OBS HIGH 50: CPT

## 2019-06-12 RX ORDER — SODIUM CHLORIDE 9 MG/ML
250 INJECTION INTRAMUSCULAR; INTRAVENOUS; SUBCUTANEOUS ONCE
Refills: 0 | Status: COMPLETED | OUTPATIENT
Start: 2019-06-12 | End: 2019-06-12

## 2019-06-12 RX ORDER — SODIUM CHLORIDE 9 MG/ML
500 INJECTION INTRAMUSCULAR; INTRAVENOUS; SUBCUTANEOUS ONCE
Refills: 0 | Status: COMPLETED | OUTPATIENT
Start: 2019-06-12 | End: 2019-06-12

## 2019-06-12 RX ORDER — LANOLIN ALCOHOL/MO/W.PET/CERES
5 CREAM (GRAM) TOPICAL AT BEDTIME
Refills: 0 | Status: COMPLETED | OUTPATIENT
Start: 2019-06-12 | End: 2019-06-12

## 2019-06-12 RX ADMIN — LIDOCAINE 1 PATCH: 4 CREAM TOPICAL at 02:43

## 2019-06-12 RX ADMIN — RIVAROXABAN 20 MILLIGRAM(S): KIT at 17:25

## 2019-06-12 RX ADMIN — Medication 1: at 17:25

## 2019-06-12 RX ADMIN — HYDROMORPHONE HYDROCHLORIDE 2 MILLIGRAM(S): 2 INJECTION INTRAMUSCULAR; INTRAVENOUS; SUBCUTANEOUS at 23:40

## 2019-06-12 RX ADMIN — Medication 100 MILLIGRAM(S): at 05:54

## 2019-06-12 RX ADMIN — Medication 20 MILLIEQUIVALENT(S): at 13:50

## 2019-06-12 RX ADMIN — Medication 100 MILLIGRAM(S): at 13:54

## 2019-06-12 RX ADMIN — Medication 120 MILLIGRAM(S): at 05:54

## 2019-06-12 RX ADMIN — Medication 5 MILLIGRAM(S): at 22:19

## 2019-06-12 RX ADMIN — GABAPENTIN 300 MILLIGRAM(S): 400 CAPSULE ORAL at 17:25

## 2019-06-12 RX ADMIN — ATORVASTATIN CALCIUM 10 MILLIGRAM(S): 80 TABLET, FILM COATED ORAL at 21:47

## 2019-06-12 RX ADMIN — Medication 60 MILLIGRAM(S): at 05:54

## 2019-06-12 RX ADMIN — Medication 20 MILLIGRAM(S): at 05:55

## 2019-06-12 RX ADMIN — Medication 1: at 12:16

## 2019-06-12 RX ADMIN — GABAPENTIN 300 MILLIGRAM(S): 400 CAPSULE ORAL at 05:54

## 2019-06-12 RX ADMIN — MONTELUKAST 10 MILLIGRAM(S): 4 TABLET, CHEWABLE ORAL at 21:46

## 2019-06-12 RX ADMIN — LIDOCAINE 1 PATCH: 4 CREAM TOPICAL at 13:54

## 2019-06-12 RX ADMIN — SODIUM CHLORIDE 500 MILLILITER(S): 9 INJECTION INTRAMUSCULAR; INTRAVENOUS; SUBCUTANEOUS at 18:44

## 2019-06-12 RX ADMIN — HYDROMORPHONE HYDROCHLORIDE 2 MILLIGRAM(S): 2 INJECTION INTRAMUSCULAR; INTRAVENOUS; SUBCUTANEOUS at 23:04

## 2019-06-12 RX ADMIN — LIDOCAINE 1 PATCH: 4 CREAM TOPICAL at 17:30

## 2019-06-12 RX ADMIN — SODIUM CHLORIDE 250 MILLILITER(S): 9 INJECTION INTRAMUSCULAR; INTRAVENOUS; SUBCUTANEOUS at 16:51

## 2019-06-12 NOTE — PROGRESS NOTE ADULT - SUBJECTIVE AND OBJECTIVE BOX
HPI: 68 y/o female with PMHx of CHF, Afib (on Xarelto), HTN, HLD, chronic venous insufficiency with dermatitis/cellulitis, DMII, Ulcerative colitis, and thyroid nodule presents to the ED c/o worsening SOB that started last night. She also has been c/o lumbar and thoracic pain which radiates. Last night while sleeping she developed chest tightness that was constant and lasted until the morning. She did have nausea with it but denies vomitting. She denies GERD or indigestion.     She was recently at [facility] and discharged 3 days ago. She has PVD and has had procedures to improve circulation in the RLE with  recently as 3 weeks ago. She was seen by him 3 days ago and has a follow up for RLE in 2 days. and recently Denies pain with movement, LE edema. Pt states her temperature was 95F yesterday, was discharged from rehab 3 days ago for cellulitis and ambulation, pt states she finished antibiotic treatment. Patient follows with Dr. Justo Mayer    6/10: MI ruled out, no cp/sob  WBC increased    6/11: code stroke called in for right facial droop and asymmetry  BP elevated 185/88    6/12: BP better earlier now SBP 87.  Lyme titers neg    PHYSICAL EXAM:    Vital Signs Last 24 Hrs  T(C): 36.8 (12 Jun 2019 10:39), Max: 36.8 (11 Jun 2019 17:59)  T(F): 98.3 (12 Jun 2019 10:39), Max: 98.3 (11 Jun 2019 17:59)  HR: 108 (12 Jun 2019 15:07) (82 - 108)  BP: 87/- (12 Jun 2019 15:07) (87/- - 170/86)  BP(mean): --  RR: 20 (12 Jun 2019 10:39) (19 - 20)  SpO2: 98% (12 Jun 2019 10:39) (98% - 98%)    Constitutional: Weak appearing  HEENT: Atraumatic, SHANICE, Normal, No congestion  Respiratory: Breath Sounds normal, no rhonchi/wheeze  Cardiovascular: N S1S2;   Gastrointestinal: Abdomen soft, non tender, Bowel Sounds present  Extremities: No edema, peripheral pulses present  Neurological: AAO x 3, right facial droop  Skin: Non cellulitic, left leg ulcers  Lymph Nodes: No lymphadenopathy noted  Back: No CVA tenderness   Musculoskeletal: non tender  Breasts: Deferred  Genitourinary: deferred  Rectal: Deferred    Lab Results:  CBC  CBC Full  -  ( 11 Jun 2019 06:43 )  WBC Count : 11.52 K/uL  RBC Count : 4.09 M/uL  Hemoglobin : 11.9 g/dL  Hematocrit : 37.0 %  Platelet Count - Automated : 467 K/uL  Mean Cell Volume : 90.5 fl  Mean Cell Hemoglobin : 29.1 pg  Mean Cell Hemoglobin Concentration : 32.2 gm/dL  Auto Neutrophil # : x  Auto Lymphocyte # : x  Auto Monocyte # : x  Auto Eosinophil # : x  Auto Basophil # : x  Auto Neutrophil % : x  Auto Lymphocyte % : x  Auto Monocyte % : x  Auto Eosinophil % : x  Auto Basophil % : x    .		Differential:	[] Automated		[] Manual  Chemistry                        11.9   11.52 )-----------( 467      ( 11 Jun 2019 06:43 )             37.0     06-10    131<L>  |  96  |  11  ----------------------------<  148<H>  3.7   |  28  |  0.55    Ca    8.8      10 Isaias 2019 07:14      < from: US Thyroid + Parathyroid (06.10.19 @ 16:30) >  IMPRESSION:     Solid nodule within the mid to lower pole of the right lobe of the   thyroid measuring approximately 2.6 x 2.0 x 1.7 cm. Complex cyst with   minimal vascularity in the midpole of the left lobe of the thyroid   measuring 1.6 x 1.3 x 1.5 cm. Further evaluation is recommended.    < end of copied text >              MEDICATIONS  (STANDING):  atorvastatin 10 milliGRAM(s) Oral at bedtime  collagenase Ointment 1 Application(s) Topical daily  dextrose 5%. 1000 milliLiter(s) (50 mL/Hr) IV Continuous <Continuous>  dextrose 50% Injectable 12.5 Gram(s) IV Push once  dextrose 50% Injectable 25 Gram(s) IV Push once  dextrose 50% Injectable 25 Gram(s) IV Push once  dicyclomine 20 milliGRAM(s) Oral before breakfast  diltiazem    milliGRAM(s) Oral daily  docusate sodium 100 milliGRAM(s) Oral daily  furosemide    Tablet 40 milliGRAM(s) Oral daily  gabapentin 300 milliGRAM(s) Oral two times a day  insulin lispro (HumaLOG) corrective regimen sliding scale   SubCutaneous three times a day before meals  metoprolol succinate  milliGRAM(s) Oral daily  montelukast 10 milliGRAM(s) Oral at bedtime  potassium chloride    Tablet ER 20 milliEquivalent(s) Oral daily  rivaroxaban 20 milliGRAM(s) Oral every 24 hours

## 2019-06-12 NOTE — PHYSICAL THERAPY INITIAL EVALUATION ADULT - MODALITIES TREATMENT COMMENTS
Pt had noted sudden dizziness during assessment seated in chair, dizziness lessened but persisted but pt felt ok to attempt amb. upon stand dizziness worsened and pt appeared to need to sit (less verbal, staring off). pt assisted back to bed w/ assist of 2, BP 87/55, O2 sat 95%, . nsg notifed. pt feeling better in bed, still dizzy though

## 2019-06-12 NOTE — PROGRESS NOTE ADULT - ASSESSMENT
70 y/o female with PMHx of CHF, Afib (on Xarelto), HTN, HLD, chronic venous insufficiency with dermatitis/cellulitis, DMII, Ulcerative colitis, and thyroid nodule ; admitted with       # Mid sternal chest pain, nausea r/o ACS, differential GERD/Indigestion, radiating from muscular back pain: MI ruled out  # Lumbar and thoracic back pain likely muscle strain  # Adnexal Cyst on CT abd/pelvis  # CHF, baseline and compensated at this time  # Afib on Xarelto, UC, HTN, HLD, DM  # Right facial droop, sec to Bell's Palsy; No CVA or Lyme' s dis    Plan:   - Troponins are negative, unlikely ACS, Dr. Leyla funes in AM appreciated;  No MI; tele rate controlled A fib; d/c tele, transfer to reg med floor  - continue Statin, Xarelto, Toprol, Diltiazem, ASA 81mg,   - home medication Uceris to be verified by pharmacy, pt can take her own  - b/l LE chronic venous changes seem to be baseline at this time, wound nurse shantel, out pt Dr. Yoo follow up as scheduled   - ob/gyn follow up for the adnexal Cyst as US transvaginal unable to visualize the ovaries  - Xray of the lumbar and thoracic area, lidocaine patch for pain control  - ID consult for leukocytosis with ulcers appreciated; monitor off ABX  - added losartan for optimal BP control; stop it d/t hypotensive episode , give low dose iv fluid. monitor BP closely  - Noted thyroid US; complex cyst + nodule  - awaiting GYN consult  - CT head, CTA head and neck to r/o CVA; not a tPA candidate as on Xarelto; CT head negative; Left ICA 50% stenosis  - Likely Bell's Palsy; started prednisone, no valtrex as per ID; Lyme Ab neg  - DVT ppx: xarelto  - Sliding scale    poc discussed with pt, family , team, Dr. Ca,       d/c tele

## 2019-06-12 NOTE — PROGRESS NOTE ADULT - SUBJECTIVE AND OBJECTIVE BOX
CHIEF COMPLAINT: Patient is a 69y old  Female who presents with a chief complaint of SOB, CP (09 Jun 2019 15:10)      HPI: HPI:  70 y/o female with PMHx of CHF, Afib (on Xarelto), HTN, HLD, chronic venous insufficiency with dermatitis/cellulitis, DMII, Ulcerative colitis, and thyroid nodule presents to the ED c/o worsening SOB that started last night. She also has been c/o lumbar and thoracic pain which radiates. Last night while sleeping she developed chest tightness that was constant and lasted until the morning. She did have nausea with it but denies vomitting. She denies GERD or indigestion.     She was recently at [facility] and discharged 3 days ago. She has PVD and has had procedures to improve circulation in the RLE with  recently as 3 weeks ago. She was seen by him 3 days ago and has a follow up for RLE in 2 days. and recently Denies pain with movement, LE edema. Pt states her temperature was 95F yesterday, was discharged from rehab 3 days ago for cellulitis and ambulation, pt states she finished antibiotic treatment. Patient follows with Dr. Justo Mayer. (09 Jun 2019 15:10)    6/12/19:  Yesterday's events noted.  Has 50% proximal LICA stenosis.  Awaiting brain MRI.  Was also seen by Vascular.      PMHx: PAST MEDICAL & SURGICAL HISTORY:  Cellulitis  HTN (hypertension)  Thyroid nodule  Peripheral venous insufficiency  Neuropathy  Morbid obesity with BMI of 40.0-44.9, adult  Dyspnea  Congestive heart failure  Atrial fibrillation  Diabetes mellitus type II, controlled  Status post medial meniscus repair  S/P left knee arthroscopy  Other complications of gastric band procedure  H/O colonoscopy  History of esophagogastroduodenoscopy (EGD)        Soc Hx:     FAMILY HISTORY:  Family history of diabetes mellitus in mother  Family history of breast cancer in mother      Allergies: Allergies    penicillin (Hives)    Intolerances          REVIEW OF SYSTEMS:    As above  + chest pain and shortness of breath  No lightheadeness or syncope  + leg swelling  No palpitations  No claudication-like symptoms    ICU Vital Signs Last 24 Hrs  T(C): 36.4 (12 Jun 2019 04:56), Max: 36.8 (11 Jun 2019 17:59)  T(F): 97.6 (12 Jun 2019 04:56), Max: 98.3 (11 Jun 2019 17:59)  HR: 89 (12 Jun 2019 04:56) (81 - 89)  BP: 100/60 (12 Jun 2019 04:56) (100/60 - 170/86)  BP(mean): --  ABP: --  ABP(mean): --  RR: 19 (12 Jun 2019 04:56) (19 - 19)  SpO2: 98% (12 Jun 2019 04:56) (98% - 99%)        I&O's Summary    09 Jun 2019 07:01  -  10 Isaias 2019 07:00  --------------------------------------------------------  IN: 550 mL / OUT: 0 mL / NET: 550 mL          CAPILLARY BLOOD GLUCOSE      POCT Blood Glucose.: 148 mg/dL (09 Jun 2019 16:02)      PHYSICAL EXAM:   Patient in NAD  Neck: No JVD; Carotids:  2+ without bruits  Respiratory:  Clear to A&P  Cardiovascular: S1 and S2, irregular rate and rhythm, no Murmurs, gallops or rubs  Gastrointestinal:  Soft, non-tender; BS positive  Extremities: + chronic bilateral edema/venous stasis changes  Neurological: A/O x 3, no focal deficits    MEDICATIONS  (STANDING):  atorvastatin 10 milliGRAM(s) Oral at bedtime  collagenase Ointment 1 Application(s) Topical daily  dextrose 5%. 1000 milliLiter(s) (50 mL/Hr) IV Continuous <Continuous>  dextrose 50% Injectable 12.5 Gram(s) IV Push once  dextrose 50% Injectable 25 Gram(s) IV Push once  dextrose 50% Injectable 25 Gram(s) IV Push once  dicyclomine 20 milliGRAM(s) Oral before breakfast  diltiazem    milliGRAM(s) Oral daily  docusate sodium 100 milliGRAM(s) Oral daily  furosemide    Tablet 40 milliGRAM(s) Oral daily  gabapentin 300 milliGRAM(s) Oral two times a day  insulin lispro (HumaLOG) corrective regimen sliding scale   SubCutaneous three times a day before meals  lidocaine   Patch 1 Patch Transdermal daily  losartan 50 milliGRAM(s) Oral daily  metoprolol succinate  milliGRAM(s) Oral daily  montelukast 10 milliGRAM(s) Oral at bedtime  potassium chloride    Tablet ER 20 milliEquivalent(s) Oral daily  predniSONE   Tablet   Oral   predniSONE   Tablet 60 milliGRAM(s) Oral daily  rivaroxaban 20 milliGRAM(s) Oral every 24 hours      Home Medications:  collagenase 250 units/g topical ointment: 1 application topically once a day (23 May 2019 10:54)  dicyclomine 20 mg oral tablet: 1 tab(s) orally once a day (before a meal) (23 May 2019 10:54)  dilTIAZem 120 mg oral tablet: 1 tab(s) orally once a day (23 May 2019 10:54)  docusate sodium 100 mg oral capsule: 1 cap(s) orally once a day (23 May 2019 10:54)  gabapentin 300 mg oral capsule: 1 cap(s) orally 3 times a day (23 May 2019 10:54)  HYDROmorphone 2 mg oral tablet: 1 tab(s) orally every 4 hours, As needed, Moderate Pain (4 - 6) (23 May 2019 10:54)  HYDROmorphone 4 mg oral tablet: 1 tab(s) orally every 6 hours, As Needed (23 May 2019 10:54)  Lasix 40 mg oral tablet: 1 tab(s) orally once a day (16 Apr 2019 20:54)  metFORMIN 1000 mg oral tablet: 1 tab(s) orally 2 times a day (16 Apr 2019 20:54)  Metoprolol Succinate  mg oral tablet, extended release: 1 tab(s) orally once a day (16 Apr 2019 20:54)  montelukast 10 mg oral tablet: 1 tab(s) orally once a day (at bedtime) (16 Apr 2019 20:54)  potassium chloride 20 mEq oral tablet, extended release: 1 tab(s) orally once a day (23 May 2019 10:54)  pravastatin 10 mg oral tablet: 1 tab(s) orally once a day (16 Apr 2019 20:54)  rivaroxaban 20 mg oral tablet: 1 tab(s) orally every 24 hours (23 May 2019 10:54)        LABS: All Labs Reviewed:  Blood Culture:   BNP Serum Pro-Brain Natriuretic Peptide: 892 pg/mL (06-09 @ 09:47)    CBC             WBC Count: 10.78 K/uL (06-09 @ 09:47)              Hemoglobin: 12.1 g/dL (06-09 @ 09:47)              Hematocrit: 38.2 % (06-09 @ 09:47)              Mean Cell Volume: 93.6 fl (06-09 @ 09:47)              Platelet Count - Automated: 529 K/uL (06-09 @ 09:47)                            Cardiac markers             Troponin I, Serum: <0.015 ng/mL (06-09 @ 23:37)  Troponin I, Serum: <0.015 ng/mL (06-09 @ 12:25)  Troponin I, Serum: <0.015 ng/mL (06-09 @ 09:47)                             Chems        Sodium, Serum: 138 mmol/L (06-09 @ 09:47)          Potassium, Serum: 3.6 mmol/L (06-09 @ 09:47)          Blood Urea Nitrogen, Serum: 16 mg/dL (06-09 @ 09:47)          Creatinine 0.73                  Protein Total, Serum: 7.6 gm/dL (06-09 @ 09:47)                  Calcium, Total Serum: 9.1 mg/dL (06-09 @ 09:47)                  Bilirubin Total, Serum: 0.3 mg/dL (06-09 @ 09:47)          Alanine Aminotransferase (ALT/SGPT): 20 U/L (06-09 @ 09:47)          Aspartate Aminotransferase (AST/SGOT): 10 U/L (06-09 @ 09:47)                 INR: 1.34 ratio (06-09 @ 09:47)             RADIOLOGY:  < from: CT Angio Abdomen and Pelvis w/ IV Cont (06.09.19 @ 11:20) >  IMPRESSION:   No intramural hematoma or aortic dissection.    Right adnexalcyst, measuring 4.3 cm, which further evaluation with   pelvic ultrasound is recommended.    < end of copied text >    < from: CT Angio Neck w/ IV Cont (06.11.19 @ 10:32) >  IMPRESSION:          1.   Right carotid system:  No hemodynamically significant stenosis.        2.   Left carotid system: Moderate (50%) stenosis at the origin of   the LEFT ICA.          3.   Intracranial circulation:  No significant vascular lesion.        4.   Brain:  Mild anterior periventricular white matter ischemia.      < end of copied text >    < from: US Thyroid + Parathyroid (06.10.19 @ 16:30) >  IMPRESSION:     Solid nodule within the mid to lower pole of the right lobe of the   thyroid measuring approximately 2.6 x 2.0 x 1.7 cm. Complex cyst with   minimal vascularity in the midpole of the left lobe of the thyroid   measuring 1.6 x 1.3 x 1.5 cm. Further evaluation is recommended.    < end of copied text >          EKG:  Atrial fibrillation    Telemetry:  Atrial fibrillation    ECHO:

## 2019-06-12 NOTE — PHYSICAL THERAPY INITIAL EVALUATION ADULT - ACTIVE RANGE OF MOTION EXAMINATION, REHAB EVAL
B hip flex ~90/bilateral upper extremity Active ROM was WFL (within functional limits)/bilateral  lower extremity Active ROM was WFL (within functional limits)

## 2019-06-12 NOTE — PHYSICAL THERAPY INITIAL EVALUATION ADULT - ADDITIONAL COMMENTS
recent DC from [facility], [place]. home 3-4d. 2 level house, 2 SHARON w/ rail. has SAC, raised toilet

## 2019-06-12 NOTE — PROGRESS NOTE ADULT - ASSESSMENT
69 year old woman with the above history admitted with chest pain and shortness of breath.  No evidence for ACS.  No evidence for PE or aortic dissection.  HR controlled.  BP on the high side.  Will monitor.  To be seen by the wound service and by Gyn regarding her adnexal mass.  There was also a thyroid mass.  Patient should have an ultrasound of both the  pelvis and thyroid.    6/11/19:  Bp high at times.  ? related to pain.  Will continue to monitor.  Consider adding an ARB.    6/12/119:  Cardiac status stable.  Has multiple other issues.  Will need a thyroid biopsy.

## 2019-06-12 NOTE — PROGRESS NOTE ADULT - SUBJECTIVE AND OBJECTIVE BOX
· Subjective and Objective: 	  HPI:  70 y/o female with PMHx of CHF, Afib (on Xarelto), HTN, HLD, chronic venous insufficiency with dermatitis/cellulitis, DMII, Ulcerative colitis, and thyroid nodule presented to  with worsening SOB. She was recently at [facility] and discharged 3 days ago. She has PVD and has had procedures to improve circulation in the RLE with  recently as 3 weeks ago. She was seen by him 3 days ago and has a follow up for RLE in 2 days. A code stroke was called at 9:50am for new onset right facial droop, R perioral numbness and slurred speech. Per RN patient was in her normal state of health this morning and called RN for R perioral numbness when RN noticed facial droop. On examination she reports numb/ tingling R side of face is improving however still reports some slurring of her speech. She denies HA, + nausea, denies vomiting, or weakness of arms/legs.   NIHSS: 3  6/12/19 : Pt still has persistent Rt Facial weakness no headaches, other c/o. Difficulty  with liquids, drooling and speech slurred. Left leg cellulitis improving.     MEDICATIONS  (STANDING):  atorvastatin 10 milliGRAM(s) Oral at bedtime  collagenase Ointment 1 Application(s) Topical daily  dextrose 5%. 1000 milliLiter(s) (50 mL/Hr) IV Continuous <Continuous>  dextrose 50% Injectable 12.5 Gram(s) IV Push once  dextrose 50% Injectable 25 Gram(s) IV Push once  dextrose 50% Injectable 25 Gram(s) IV Push once  dicyclomine 20 milliGRAM(s) Oral before breakfast  diltiazem    milliGRAM(s) Oral daily  docusate sodium 100 milliGRAM(s) Oral daily  furosemide    Tablet 40 milliGRAM(s) Oral daily  gabapentin 300 milliGRAM(s) Oral two times a day  insulin lispro (HumaLOG) corrective regimen sliding scale   SubCutaneous three times a day before meals  lidocaine   Patch 1 Patch Transdermal daily  losartan 50 milliGRAM(s) Oral daily  metoprolol succinate  milliGRAM(s) Oral daily  montelukast 10 milliGRAM(s) Oral at bedtime  potassium chloride    Tablet ER 20 milliEquivalent(s) Oral daily  predniSONE   Tablet   Oral   predniSONE   Tablet 60 milliGRAM(s) Oral daily  rivaroxaban 20 milliGRAM(s) Oral every 24 hours      Vital Signs Last 24 Hrs  T(C): 36.8 (12 Jun 2019 10:39), Max: 36.8 (11 Jun 2019 17:59)  T(F): 98.3 (12 Jun 2019 10:39), Max: 98.3 (11 Jun 2019 17:59)  HR: 83 (12 Jun 2019 10:39) (82 - 89)  BP: 87/58 (12 Jun 2019 10:39) (87/58 - 170/86)  BP(mean): --  RR: 20 (12 Jun 2019 10:39) (19 - 20)  SpO2: 98% (12 Jun 2019 10:39) (98% - 98%)    Neurological exam:  HF: A x O x 3. Appropriately interactive, normal affect. Mild dysarthria. No Aphasia or paraphasic errors. Naming /repetition intact   CN: JORDAN, EOMI, VFF, facial sensation normal, no NLFD, tongue midline, Palate moves equally, SCM equal bilaterally  Motor: No pronator drift, Strength 5/5 except R LE lifts antigravity, limited ROM 2/2 pain  Sens: Intact to light touch  Reflexes: Symmetric and normal, downgoing toes b/l  Coord:  No FNFA, dysmetria, MERCEDES intact   Gait/Balance: Cannot test    NIHSS: 2                        11.9   11.52 )-----------( 467      ( 11 Jun 2019 06:43 )             37.0       Radiology report:  - CT Head  < from: CT Angio Neck w/ IV Cont (06.11.19 @ 10:32) >  IMPRESSION:          1.   Right carotid system:  No hemodynamically significant stenosis.        2.   Left carotid system: Moderate (50%) stenosis at the origin of   the LEFT ICA.          3.   Intracranial circulation:  No significant vascular lesion.        4.   Brain:  Mild anterior periventricular white matter ischemia.    Critical value:  I discussed the finding of this report with Dr. Collier   at 10:45 AM on 06/11/2019.  Critical value policy of the hospital was   followed.Read back and confirmation of receipt of this communication   was performed.  This verbal communication supplements the text report of   this document.

## 2019-06-12 NOTE — PHYSICAL THERAPY INITIAL EVALUATION ADULT - PERTINENT HX OF CURRENT PROBLEM, REHAB EVAL
CP,SOB.  No evidence for ACS.  No evidence for PE or aortic dissection per cardiol. recently at [facility] and discharged 3 days PTA. Also w/ recent vasc procedure RLE. Also c/o TS, LS pain-XRs +degen changes, spondylosis. Code stroke called yesterday a.m due to new right facial droop, right perioral numbness, slurred speech. CTH (-) ac. findings

## 2019-06-13 ENCOUNTER — TRANSCRIPTION ENCOUNTER (OUTPATIENT)
Age: 69
End: 2019-06-13

## 2019-06-13 VITALS — WEIGHT: 251.77 LBS

## 2019-06-13 LAB
ANION GAP SERPL CALC-SCNC: 9 MMOL/L — SIGNIFICANT CHANGE UP (ref 5–17)
BUN SERPL-MCNC: 29 MG/DL — HIGH (ref 7–23)
CALCIUM SERPL-MCNC: 9 MG/DL — SIGNIFICANT CHANGE UP (ref 8.5–10.1)
CHLORIDE SERPL-SCNC: 98 MMOL/L — SIGNIFICANT CHANGE UP (ref 96–108)
CO2 SERPL-SCNC: 27 MMOL/L — SIGNIFICANT CHANGE UP (ref 22–31)
CREAT SERPL-MCNC: 0.74 MG/DL — SIGNIFICANT CHANGE UP (ref 0.5–1.3)
GLUCOSE BLDC GLUCOMTR-MCNC: 143 MG/DL — HIGH (ref 70–99)
GLUCOSE BLDC GLUCOMTR-MCNC: 259 MG/DL — HIGH (ref 70–99)
GLUCOSE SERPL-MCNC: 149 MG/DL — HIGH (ref 70–99)
HCT VFR BLD CALC: 39.8 % — SIGNIFICANT CHANGE UP (ref 34.5–45)
HGB BLD-MCNC: 12.9 G/DL — SIGNIFICANT CHANGE UP (ref 11.5–15.5)
MCHC RBC-ENTMCNC: 29.5 PG — SIGNIFICANT CHANGE UP (ref 27–34)
MCHC RBC-ENTMCNC: 32.4 GM/DL — SIGNIFICANT CHANGE UP (ref 32–36)
MCV RBC AUTO: 91.1 FL — SIGNIFICANT CHANGE UP (ref 80–100)
PLATELET # BLD AUTO: 401 K/UL — HIGH (ref 150–400)
POTASSIUM SERPL-MCNC: 3.2 MMOL/L — LOW (ref 3.5–5.3)
POTASSIUM SERPL-MCNC: 4.2 MMOL/L — SIGNIFICANT CHANGE UP (ref 3.5–5.3)
POTASSIUM SERPL-SCNC: 3.2 MMOL/L — LOW (ref 3.5–5.3)
POTASSIUM SERPL-SCNC: 4.2 MMOL/L — SIGNIFICANT CHANGE UP (ref 3.5–5.3)
RBC # BLD: 4.37 M/UL — SIGNIFICANT CHANGE UP (ref 3.8–5.2)
RBC # FLD: 14.6 % — HIGH (ref 10.3–14.5)
SODIUM SERPL-SCNC: 134 MMOL/L — LOW (ref 135–145)
WBC # BLD: 12.52 K/UL — HIGH (ref 3.8–10.5)
WBC # FLD AUTO: 12.52 K/UL — HIGH (ref 3.8–10.5)

## 2019-06-13 RX ORDER — ASPIRIN/CALCIUM CARB/MAGNESIUM 324 MG
1 TABLET ORAL
Qty: 0 | Refills: 0 | DISCHARGE

## 2019-06-13 RX ORDER — WARFARIN SODIUM 2.5 MG/1
1 TABLET ORAL
Qty: 0 | Refills: 0 | DISCHARGE

## 2019-06-13 RX ORDER — BUDESONIDE, MICRONIZED 100 %
1 POWDER (GRAM) MISCELLANEOUS
Qty: 0 | Refills: 0 | DISCHARGE

## 2019-06-13 RX ORDER — HYDROMORPHONE HYDROCHLORIDE 2 MG/ML
0.5 INJECTION INTRAMUSCULAR; INTRAVENOUS; SUBCUTANEOUS ONCE
Refills: 0 | Status: DISCONTINUED | OUTPATIENT
Start: 2019-06-13 | End: 2019-06-13

## 2019-06-13 RX ORDER — FUROSEMIDE 40 MG
1 TABLET ORAL
Qty: 0 | Refills: 0 | DISCHARGE

## 2019-06-13 RX ORDER — ZOLPIDEM TARTRATE 10 MG/1
1 TABLET ORAL
Qty: 0 | Refills: 0 | DISCHARGE

## 2019-06-13 RX ORDER — FLUTICASONE PROPIONATE 50 MCG
1 SPRAY, SUSPENSION NASAL
Qty: 0 | Refills: 0 | DISCHARGE

## 2019-06-13 RX ORDER — HYDROMORPHONE HYDROCHLORIDE 2 MG/ML
2 INJECTION INTRAMUSCULAR; INTRAVENOUS; SUBCUTANEOUS ONCE
Refills: 0 | Status: DISCONTINUED | OUTPATIENT
Start: 2019-06-13 | End: 2019-06-13

## 2019-06-13 RX ORDER — GABAPENTIN 400 MG/1
0 CAPSULE ORAL
Qty: 0 | Refills: 0 | DISCHARGE

## 2019-06-13 RX ORDER — GABAPENTIN 400 MG/1
1 CAPSULE ORAL
Qty: 0 | Refills: 0 | DISCHARGE

## 2019-06-13 RX ORDER — POTASSIUM CHLORIDE 20 MEQ
1 PACKET (EA) ORAL
Qty: 0 | Refills: 0 | DISCHARGE

## 2019-06-13 RX ORDER — MONTELUKAST 4 MG/1
1 TABLET, CHEWABLE ORAL
Qty: 0 | Refills: 0 | DISCHARGE

## 2019-06-13 RX ORDER — POTASSIUM CHLORIDE 20 MEQ
0 PACKET (EA) ORAL
Qty: 0 | Refills: 0 | DISCHARGE

## 2019-06-13 RX ORDER — FERROUS SULFATE 325(65) MG
1 TABLET ORAL
Qty: 0 | Refills: 0 | DISCHARGE

## 2019-06-13 RX ORDER — FUROSEMIDE 40 MG
2 TABLET ORAL
Qty: 0 | Refills: 0 | DISCHARGE

## 2019-06-13 RX ORDER — LIDOCAINE 4 G/100G
1 CREAM TOPICAL
Qty: 5 | Refills: 0
Start: 2019-06-13 | End: 2019-06-17

## 2019-06-13 RX ORDER — OXYBUTYNIN CHLORIDE 5 MG
1 TABLET ORAL
Qty: 0 | Refills: 0 | DISCHARGE

## 2019-06-13 RX ORDER — ATORVASTATIN CALCIUM 80 MG/1
1 TABLET, FILM COATED ORAL
Qty: 0 | Refills: 0 | DISCHARGE

## 2019-06-13 RX ORDER — POTASSIUM CHLORIDE 20 MEQ
40 PACKET (EA) ORAL ONCE
Refills: 0 | Status: COMPLETED | OUTPATIENT
Start: 2019-06-13 | End: 2019-06-13

## 2019-06-13 RX ORDER — SIMETHICONE 80 MG/1
1 TABLET, CHEWABLE ORAL
Qty: 0 | Refills: 0 | DISCHARGE

## 2019-06-13 RX ORDER — DILTIAZEM HCL 120 MG
1 CAPSULE, EXT RELEASE 24 HR ORAL
Qty: 0 | Refills: 0 | DISCHARGE

## 2019-06-13 RX ORDER — GLIMEPIRIDE 1 MG
1 TABLET ORAL
Qty: 0 | Refills: 0 | DISCHARGE

## 2019-06-13 RX ORDER — OXYCODONE HYDROCHLORIDE 5 MG/1
1 TABLET ORAL
Qty: 0 | Refills: 0 | DISCHARGE

## 2019-06-13 RX ORDER — DILTIAZEM HCL 120 MG
2 CAPSULE, EXT RELEASE 24 HR ORAL
Qty: 0 | Refills: 0 | DISCHARGE

## 2019-06-13 RX ORDER — WARFARIN SODIUM 2.5 MG/1
0 TABLET ORAL
Qty: 0 | Refills: 0 | DISCHARGE

## 2019-06-13 RX ORDER — FUROSEMIDE 40 MG
0 TABLET ORAL
Qty: 0 | Refills: 0 | DISCHARGE

## 2019-06-13 RX ADMIN — Medication 100 MILLIGRAM(S): at 05:55

## 2019-06-13 RX ADMIN — Medication 20 MILLIEQUIVALENT(S): at 11:34

## 2019-06-13 RX ADMIN — Medication 3: at 12:00

## 2019-06-13 RX ADMIN — Medication 60 MILLIGRAM(S): at 05:55

## 2019-06-13 RX ADMIN — HYDROMORPHONE HYDROCHLORIDE 2 MILLIGRAM(S): 2 INJECTION INTRAMUSCULAR; INTRAVENOUS; SUBCUTANEOUS at 08:52

## 2019-06-13 RX ADMIN — Medication 650 MILLIGRAM(S): at 00:07

## 2019-06-13 RX ADMIN — LIDOCAINE 1 PATCH: 4 CREAM TOPICAL at 02:31

## 2019-06-13 RX ADMIN — HYDROMORPHONE HYDROCHLORIDE 0.5 MILLIGRAM(S): 2 INJECTION INTRAMUSCULAR; INTRAVENOUS; SUBCUTANEOUS at 04:00

## 2019-06-13 RX ADMIN — HYDROMORPHONE HYDROCHLORIDE 2 MILLIGRAM(S): 2 INJECTION INTRAMUSCULAR; INTRAVENOUS; SUBCUTANEOUS at 05:58

## 2019-06-13 RX ADMIN — GABAPENTIN 300 MILLIGRAM(S): 400 CAPSULE ORAL at 05:58

## 2019-06-13 RX ADMIN — LIDOCAINE 1 PATCH: 4 CREAM TOPICAL at 11:33

## 2019-06-13 RX ADMIN — HYDROMORPHONE HYDROCHLORIDE 2 MILLIGRAM(S): 2 INJECTION INTRAMUSCULAR; INTRAVENOUS; SUBCUTANEOUS at 09:31

## 2019-06-13 RX ADMIN — HYDROMORPHONE HYDROCHLORIDE 0.5 MILLIGRAM(S): 2 INJECTION INTRAMUSCULAR; INTRAVENOUS; SUBCUTANEOUS at 03:20

## 2019-06-13 RX ADMIN — Medication 100 MILLIGRAM(S): at 11:34

## 2019-06-13 RX ADMIN — Medication 40 MILLIEQUIVALENT(S): at 14:08

## 2019-06-13 RX ADMIN — HYDROMORPHONE HYDROCHLORIDE 2 MILLIGRAM(S): 2 INJECTION INTRAMUSCULAR; INTRAVENOUS; SUBCUTANEOUS at 03:02

## 2019-06-13 RX ADMIN — Medication 1 APPLICATION(S): at 11:33

## 2019-06-13 RX ADMIN — Medication 120 MILLIGRAM(S): at 05:55

## 2019-06-13 RX ADMIN — Medication 650 MILLIGRAM(S): at 06:54

## 2019-06-13 RX ADMIN — Medication 650 MILLIGRAM(S): at 00:30

## 2019-06-13 RX ADMIN — HYDROMORPHONE HYDROCHLORIDE 2 MILLIGRAM(S): 2 INJECTION INTRAMUSCULAR; INTRAVENOUS; SUBCUTANEOUS at 03:48

## 2019-06-13 NOTE — DISCHARGE NOTE NURSING/CASE MANAGEMENT/SOCIAL WORK - NSDCDPATPORTLINK_GEN_ALL_CORE
You can access the [employer] [facility] Portal, offered by [facility], by registering with the following website: [email or web address]

## 2019-06-13 NOTE — PROVIDER CONTACT NOTE (OTHER) - ACTION/TREATMENT ORDERED:
MD Nesbitt aware. As per MD did not want to add additional pain meds at this time. 650mg tylenol given. Will cont to monitor pt.
no additional interventions at this time, MD to order Wound care consult. pt. reeducated on bedrest order and safety precautions.

## 2019-06-13 NOTE — DISCHARGE NOTE PROVIDER - HOSPITAL COURSE
PHYSICAL EXAM:        Daily       Daily Weight in k.2 (2019 09:01)        ICU Vital Signs Last 24 Hrs    T(C): 36.4 (2019 04:53), Max: 36.9 (2019 16:30)    T(F): 97.5 (2019 04:53), Max: 98.4 (2019 16:30)    HR: 80 (2019 08:39) (79 - 91)    BP: 156/93 (2019 08:39) (92/54 - 156/93)    BP(mean): --    ABP: --    ABP(mean): --    RR: 19 (2019 04:53) (19 - 19)    SpO2: 97% (2019 04:53) (93% - 97%)            Constitutional: Well appearing    HEENT: Atraumatic, SHANICE, Normal, No congestion    Respiratory: Breath Sounds normal, no rhonchi/wheeze    Cardiovascular: N S1S2;     Gastrointestinal: Abdomen soft, non tender, Bowel Sounds present    Extremities: No edema, peripheral pulses present    Neurological: AAO x 3, no gross focal motor deficits    Skin: Non cellulitic, no rash, ulcers    Lymph Nodes: No lymphadenopathy noted    Back: No CVA tenderness     Musculoskeletal: non tender    Breasts: Deferred    Genitourinary: deferred    Rectal: Deferred            70 y/o female with PMHx of CHF, Afib (on Xarelto), HTN, HLD, chronic venous insufficiency with dermatitis/cellulitis, DMII, Ulcerative colitis, and thyroid nodule ; admitted with             # Mid sternal chest pain, nausea r/o ACS, differential GERD/Indigestion, radiating from muscular back pain: MI ruled out    # Lumbar and thoracic back pain likely muscle strain    # Adnexal Cyst on CT abd/pelvis    # CHF, baseline and compensated at this time    # Afib on Xarelto, UC, HTN, HLD, DM    # Right facial droop, sec to Bell's Palsy; No CVA or Lyme' s dis        Plan:     - Troponins are negative, unlikely ACS, Dr. Leyla funes in AM appreciated;  No MI; tele rate controlled A fib; d/c tele, transfer to reg med floor    - continue Statin, Xarelto, Toprol, Diltiazem, ASA 81mg,     - home medication Uceris to be verified by pharmacy, pt can take her own    - b/l LE chronic venous changes seem to be baseline at this time, wound nurse shantel, out pt Dr. Yoo follow up as scheduled     - ob/gyn follow up for the adnexal Cyst as US transvaginal unable to visualize the ovaries    - Xray of the lumbar and thoracic area, lidocaine patch for pain control    - ID consult for leukocytosis with ulcers appreciated; monitor off ABX    - added losartan for optimal BP control; stop it d/t hypotensive episode , give low dose iv fluid. monitor BP closely    - Noted thyroid US; complex cyst + nodule    - awaiting GYN consult    - CT head, CTA head and neck to r/o CVA; not a tPA candidate as on Xarelto; CT head negative; Left ICA 50% stenosis    - Likely Bell's Palsy; started prednisone, no valtrex as per ID; Lyme Ab neg        d/c home today        time spent 39 min

## 2019-06-13 NOTE — CHART NOTE - NSCHARTNOTEFT_GEN_A_CORE
Called by RN ~midnight about pt c/o pain. As per RN, pt just received Dilaudid 2mg PO about half-an-hour ago.    - Will hold off on any further pain med at the moment, and give the pain med which pt just received to take more effect.  - Pt is ordered for Dilaudid 2mg PO Q4hrs PRN for mod pain, here inpatient Called by RN ~midnight about pt c/o pain. As per RN, pt just received Dilaudid 2mg PO about half-an-hour ago.    - Will hold off on any further pain med at the moment, and give the pain med which pt just received to take more effect.  - Pt is ordered for Dilaudid 2mg PO Q4hrs PRN for mod pain, here inpatient      Addendum:  Later on was called by RN again as pt is in room crying about still being in pain.   Pt was seen together with Dr. Gallego, pt at the time was sitting up in bed, rocking back and forth, crying, stated that her pain is in the back, and currently "20" out of 10 pain.  - Pt's pain medications were reviewed. Pt also reported that she takes 4mg dilaudid PO outpatient.    - 2mg PO dilaudid x1 dose was initially given, as per discussion with Dr. Gallego  - Dilaudid 0.5mg IV x1 dose was also ordered to be given ~20mins later given severe pain, per discussion with Dr. Gallego    - Would suggest pain medication doses and route re-evaluation during the day, as well as pain management consult as pt will likely benefit from it for better management of her chronic pain. - Will sign this out to Day Hospitalist in the AM.    Above plan discussed with Dr. Gallego.

## 2019-06-13 NOTE — PROVIDER CONTACT NOTE (OTHER) - ASSESSMENT
Pt c/o 8/10 back pain, received 2mg PO dilaudid per MD order. As per pt, 2mg ineffective. Per outpt med rec pt takes 4mg PO dilaudid at home. Pt crying in bed with c/o 8/10 back pain, received 2mg PO dilaudid per MD order. As per pt, 2mg ineffective. Per outpt med rec pt takes 4mg PO dilaudid at home.

## 2019-06-13 NOTE — PROGRESS NOTE ADULT - ASSESSMENT
69 year old woman with the above history admitted with chest pain and shortness of breath.  No evidence for ACS.  No evidence for PE or aortic dissection.  HR controlled.  BP on the high side.  Will monitor.  To be seen by the wound service and by Gyn regarding her adnexal mass.  There was also a thyroid mass.  Patient should have an ultrasound of both the  pelvis and thyroid.    6/11/19:  Bp high at times.  ? related to pain.  Will continue to monitor.  Consider adding an ARB.    6/12/19:  Cardiac status stable.  Has multiple other issues.  Will need a thyroid biopsy.      6/13/19:  Cardiac status remains stable.  Can discharge as desired.

## 2019-06-13 NOTE — DISCHARGE NOTE PROVIDER - CARE PROVIDER_API CALL
Justo Mayer (MD)  Cardiovascular Disease; Internal Medicine  [address], [address]  [place] [address]  Phone: [phone]  Fax: [phone]  Follow Up Time:

## 2019-06-13 NOTE — PROGRESS NOTE ADULT - SUBJECTIVE AND OBJECTIVE BOX
CHIEF COMPLAINT: Patient is a 69y old  Female who presents with a chief complaint of SOB, CP (09 Jun 2019 15:10)      HPI: HPI:  70 y/o female with PMHx of CHF, Afib (on Xarelto), HTN, HLD, chronic venous insufficiency with dermatitis/cellulitis, DMII, Ulcerative colitis, and thyroid nodule presents to the ED c/o worsening SOB that started last night. She also has been c/o lumbar and thoracic pain which radiates. Last night while sleeping she developed chest tightness that was constant and lasted until the morning. She did have nausea with it but denies vomitting. She denies GERD or indigestion.     She was recently at [facility] and discharged 3 days ago. She has PVD and has had procedures to improve circulation in the RLE with  recently as 3 weeks ago. She was seen by him 3 days ago and has a follow up for RLE in 2 days. and recently Denies pain with movement, LE edema. Pt states her temperature was 95F yesterday, was discharged from rehab 3 days ago for cellulitis and ambulation, pt states she finished antibiotic treatment. Patient follows with Dr. Justo Mayer. (09 Jun 2019 15:10)    6/12/19:  Yesterday's events noted.  Has 50% proximal LICA stenosis.  Awaiting brain MRI.  Was also seen by Vascular.      PMHx: PAST MEDICAL & SURGICAL HISTORY:  Cellulitis  HTN (hypertension)  Thyroid nodule  Peripheral venous insufficiency  Neuropathy  Morbid obesity with BMI of 40.0-44.9, adult  Dyspnea  Congestive heart failure  Atrial fibrillation  Diabetes mellitus type II, controlled  Status post medial meniscus repair  S/P left knee arthroscopy  Other complications of gastric band procedure  H/O colonoscopy  History of esophagogastroduodenoscopy (EGD)        Soc Hx:     FAMILY HISTORY:  Family history of diabetes mellitus in mother  Family history of breast cancer in mother      Allergies: Allergies    penicillin (Hives)    Intolerances          REVIEW OF SYSTEMS:    As above  + chest pain and shortness of breath  No lightheadeness or syncope  + leg swelling  No palpitations  No claudication-like symptoms    ICU Vital Signs Last 24 Hrs  T(C): 36.4 (13 Jun 2019 04:53), Max: 36.9 (12 Jun 2019 16:30)  T(F): 97.5 (13 Jun 2019 04:53), Max: 98.4 (12 Jun 2019 16:30)  HR: 79 (13 Jun 2019 04:53) (79 - 108)  BP: 139/85 (13 Jun 2019 04:53) (87/- - 139/85)  BP(mean): --  ABP: --  ABP(mean): --  RR: 19 (13 Jun 2019 04:53) (19 - 20)  SpO2: 97% (13 Jun 2019 04:53) (93% - 98%)          I&O's Summary    09 Jun 2019 07:01  -  10 Isaias 2019 07:00  --------------------------------------------------------  IN: 550 mL / OUT: 0 mL / NET: 550 mL          CAPILLARY BLOOD GLUCOSE      POCT Blood Glucose.: 148 mg/dL (09 Jun 2019 16:02)      PHYSICAL EXAM:   Patient in NAD  Neck: No JVD; Carotids:  2+ without bruits  Respiratory:  Clear to A&P  Cardiovascular: S1 and S2, irregular rate and rhythm, no Murmurs, gallops or rubs  Gastrointestinal:  Soft, non-tender; BS positive  Extremities: + chronic bilateral edema/venous stasis changes  Neurological: A/O x 3, no focal deficits    MEDICATIONS  (STANDING):  atorvastatin 10 milliGRAM(s) Oral at bedtime  collagenase Ointment 1 Application(s) Topical daily  dextrose 5%. 1000 milliLiter(s) (50 mL/Hr) IV Continuous <Continuous>  dextrose 50% Injectable 12.5 Gram(s) IV Push once  dextrose 50% Injectable 25 Gram(s) IV Push once  dextrose 50% Injectable 25 Gram(s) IV Push once  dicyclomine 20 milliGRAM(s) Oral before breakfast  diltiazem    milliGRAM(s) Oral daily  docusate sodium 100 milliGRAM(s) Oral daily  furosemide    Tablet 40 milliGRAM(s) Oral daily  gabapentin 300 milliGRAM(s) Oral two times a day  insulin lispro (HumaLOG) corrective regimen sliding scale   SubCutaneous three times a day before meals  lidocaine   Patch 1 Patch Transdermal daily  losartan 50 milliGRAM(s) Oral daily  metoprolol succinate  milliGRAM(s) Oral daily  montelukast 10 milliGRAM(s) Oral at bedtime  potassium chloride    Tablet ER 20 milliEquivalent(s) Oral daily  predniSONE   Tablet   Oral   predniSONE   Tablet 60 milliGRAM(s) Oral daily  rivaroxaban 20 milliGRAM(s) Oral every 24 hours    MEDICATIONS  (STANDING):  atorvastatin 10 milliGRAM(s) Oral at bedtime  collagenase Ointment 1 Application(s) Topical daily  dextrose 5%. 1000 milliLiter(s) (50 mL/Hr) IV Continuous <Continuous>  dextrose 50% Injectable 12.5 Gram(s) IV Push once  dextrose 50% Injectable 25 Gram(s) IV Push once  dextrose 50% Injectable 25 Gram(s) IV Push once  dicyclomine 20 milliGRAM(s) Oral before breakfast  diltiazem    milliGRAM(s) Oral daily  docusate sodium 100 milliGRAM(s) Oral daily  furosemide    Tablet 40 milliGRAM(s) Oral daily  gabapentin 300 milliGRAM(s) Oral two times a day  insulin lispro (HumaLOG) corrective regimen sliding scale   SubCutaneous three times a day before meals  lidocaine   Patch 1 Patch Transdermal daily  metoprolol succinate  milliGRAM(s) Oral daily  montelukast 10 milliGRAM(s) Oral at bedtime  potassium chloride    Tablet ER 20 milliEquivalent(s) Oral daily  predniSONE   Tablet   Oral   rivaroxaban 20 milliGRAM(s) Oral every 24 hours          LABS: All Labs Reviewed:  Blood Culture:   BNP Serum Pro-Brain Natriuretic Peptide: 892 pg/mL (06-09 @ 09:47)    CBC             WBC Count: 10.78 K/uL (06-09 @ 09:47)              Hemoglobin: 12.1 g/dL (06-09 @ 09:47)              Hematocrit: 38.2 % (06-09 @ 09:47)              Mean Cell Volume: 93.6 fl (06-09 @ 09:47)              Platelet Count - Automated: 529 K/uL (06-09 @ 09:47)                              Cardiac markers             Troponin I, Serum: <0.015 ng/mL (06-09 @ 23:37)  Troponin I, Serum: <0.015 ng/mL (06-09 @ 12:25)  Troponin I, Serum: <0.015 ng/mL (06-09 @ 09:47)                             Chems        Sodium, Serum: 138 mmol/L (06-09 @ 09:47)          Potassium, Serum: 3.6 mmol/L (06-09 @ 09:47)          Blood Urea Nitrogen, Serum: 16 mg/dL (06-09 @ 09:47)          Creatinine 0.73                  Protein Total, Serum: 7.6 gm/dL (06-09 @ 09:47)                  Calcium, Total Serum: 9.1 mg/dL (06-09 @ 09:47)                  Bilirubin Total, Serum: 0.3 mg/dL (06-09 @ 09:47)          Alanine Aminotransferase (ALT/SGPT): 20 U/L (06-09 @ 09:47)          Aspartate Aminotransferase (AST/SGOT): 10 U/L (06-09 @ 09:47)                     INR: 1.34 ratio (06-09 @ 09:47)             RADIOLOGY:  < from: CT Angio Abdomen and Pelvis w/ IV Cont (06.09.19 @ 11:20) >  IMPRESSION:   No intramural hematoma or aortic dissection.    Right adnexalcyst, measuring 4.3 cm, which further evaluation with   pelvic ultrasound is recommended.    < end of copied text >    < from: CT Angio Neck w/ IV Cont (06.11.19 @ 10:32) >  IMPRESSION:          1.   Right carotid system:  No hemodynamically significant stenosis.        2.   Left carotid system: Moderate (50%) stenosis at the origin of   the LEFT ICA.          3.   Intracranial circulation:  No significant vascular lesion.        4.   Brain:  Mild anterior periventricular white matter ischemia.      < end of copied text >    < from: US Thyroid + Parathyroid (06.10.19 @ 16:30) >  IMPRESSION:     Solid nodule within the mid to lower pole of the right lobe of the   thyroid measuring approximately 2.6 x 2.0 x 1.7 cm. Complex cyst with   minimal vascularity in the midpole of the left lobe of the thyroid   measuring 1.6 x 1.3 x 1.5 cm. Further evaluation is recommended.    < end of copied text >          EKG:  Atrial fibrillation    Telemetry:  Atrial fibrillation    ECHO:

## 2019-06-13 NOTE — DISCHARGE NOTE PROVIDER - NSDCCPCAREPLAN_GEN_ALL_CORE_FT
PRINCIPAL DISCHARGE DIAGNOSIS  Diagnosis: Chest pain, unspecified type  Assessment and Plan of Treatment:       SECONDARY DISCHARGE DIAGNOSES  Diagnosis: Ovarian mass, right  Assessment and Plan of Treatment:

## 2019-06-18 DIAGNOSIS — I11.0 HYPERTENSIVE HEART DISEASE WITH HEART FAILURE: ICD-10-CM

## 2019-06-18 DIAGNOSIS — S39.012A STRAIN OF MUSCLE, FASCIA AND TENDON OF LOWER BACK, INITIAL ENCOUNTER: ICD-10-CM

## 2019-06-18 DIAGNOSIS — E66.01 MORBID (SEVERE) OBESITY DUE TO EXCESS CALORIES: ICD-10-CM

## 2019-06-18 DIAGNOSIS — R07.89 OTHER CHEST PAIN: ICD-10-CM

## 2019-06-18 DIAGNOSIS — E04.1 NONTOXIC SINGLE THYROID NODULE: ICD-10-CM

## 2019-06-18 DIAGNOSIS — E78.5 HYPERLIPIDEMIA, UNSPECIFIED: ICD-10-CM

## 2019-06-18 DIAGNOSIS — I73.9 PERIPHERAL VASCULAR DISEASE, UNSPECIFIED: ICD-10-CM

## 2019-06-18 DIAGNOSIS — D72.829 ELEVATED WHITE BLOOD CELL COUNT, UNSPECIFIED: ICD-10-CM

## 2019-06-18 DIAGNOSIS — M54.9 DORSALGIA, UNSPECIFIED: ICD-10-CM

## 2019-06-18 DIAGNOSIS — E11.9 TYPE 2 DIABETES MELLITUS WITHOUT COMPLICATIONS: ICD-10-CM

## 2019-06-18 DIAGNOSIS — I50.9 HEART FAILURE, UNSPECIFIED: ICD-10-CM

## 2019-06-18 DIAGNOSIS — N83.201 UNSPECIFIED OVARIAN CYST, RIGHT SIDE: ICD-10-CM

## 2019-06-18 DIAGNOSIS — K51.90 ULCERATIVE COLITIS, UNSPECIFIED, WITHOUT COMPLICATIONS: ICD-10-CM
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
## 2019-06-18 DIAGNOSIS — I48.91 UNSPECIFIED ATRIAL FIBRILLATION: ICD-10-CM
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
## 2019-06-18 DIAGNOSIS — G51.0 BELL'S PALSY: ICD-10-CM

## 2019-06-18 DIAGNOSIS — S29.012A STRAIN OF MUSCLE AND TENDON OF BACK WALL OF THORAX, INITIAL ENCOUNTER: ICD-10-CM

## 2019-06-18 DIAGNOSIS — N83.8 OTHER NONINFLAMMATORY DISORDERS OF OVARY, FALLOPIAN TUBE AND BROAD LIGAMENT: ICD-10-CM

## 2019-06-18 DIAGNOSIS — I87.2 VENOUS INSUFFICIENCY (CHRONIC) (PERIPHERAL): ICD-10-CM

## 2019-06-18 DIAGNOSIS — K21.9 GASTRO-ESOPHAGEAL REFLUX DISEASE WITHOUT ESOPHAGITIS: ICD-10-CM

## 2019-07-17 NOTE — PATIENT PROFILE ADULT - NSTRANSFERBELONGINGSDISPO_GEN_A_NUR
Post-Care Instructions: I reviewed with the patient in detail post-care instructions. Patient understands to keep the injection sites clean and call the clinic if there is any redness, swelling or pain. with patient

## 2019-07-30 NOTE — ED ADULT NURSE NOTE - PERIPHERAL VASCULAR WDL
Patient Education        Tooth Decay: Care Instructions  Your Care Instructions    Tooth decay is damage to a tooth caused by plaque. Plaque is a thin film of bacteria that sticks to the teeth above and below the gum line. If plaque isn't removed from the teeth, it can build up and harden into tartar. The bacteria in plaque and tartar use sugars in food to make acids. These acids can cause tooth decay and gum disease. Any part of your tooth can decay, from the roots below the gum line to the chewing surface. Decay can affect the outer layer (enamel) or inner layer (dentin) of your teeth. The deeper the decay, the worse the damage. Untreated tooth decay will get worse and may lead to tooth loss. If you have a small hole (cavity) in your tooth, your dentist can repair it by removing the decay and filling the hole. If you have deeper decay, you may need more treatment. A very badly damaged tooth may have to be removed. Follow-up care is a key part of your treatment and safety. Be sure to make and go to all appointments, and call your dentist if you are having problems. It's also a good idea to know your test results and keep a list of the medicines you take. How can you care for yourself at home? If you have pain:  · Take an over-the-counter pain medicine, such as acetaminophen (Tylenol), ibuprofen (Advil, Motrin), or naproxen (Aleve). Be safe with medicines. Read and follow all instructions on the label. ? Do not take two or more pain medicines at the same time unless the doctor told you to. Many pain medicines have acetaminophen, which is Tylenol. Too much acetaminophen (Tylenol) can be harmful. · Put ice or a cold pack on your cheek over the tooth for 10 to 15 minutes at a time. Put a thin cloth between the ice and your skin. To prevent tooth decay  · Brush teeth twice a day, and floss once a day. Brushing with fluoride toothpaste and flossing may be enough to reverse early decay.   · Use a toothbrush with soft, rounded-end bristles and a head that is small enough to reach all parts of your teeth and mouth. Replace your toothbrush every 3 or 4 months. You may also use an electric toothbrush that has rotating and oscillating (back-and-forth) action. · Ask your dentist about having fluoride treatments at the dental office. · Brush your tongue to help get rid of bacteria. · Eat healthy foods that include whole grains, vegetables, and fruits. · Have your teeth cleaned by a professional at least two times a year. · Do not smoke or use smokeless tobacco. Tobacco can make tooth decay worse. When should you call for help? Call 911 anytime you think you may need emergency care. For example, call if:    · You have trouble breathing.    Call your dentist now or seek immediate medical care if:    · You have new or worse symptoms of infection, such as:  ? Increased pain, swelling, warmth, or redness. ? Red streaks leading from the area. ? Pus draining from the area. ? A fever.    Watch closely for changes in your health, and be sure to contact your doctor if:    · You do not get better as expected. Where can you learn more? Go to http://madyForgametravon.info/. Enter X269 in the search box to learn more about \"Tooth Decay: Care Instructions. \"  Current as of: October 3, 2018  Content Version: 12.1  © 6094-5586 [employer]. Care instructions adapted under license by [employer] (which disclaims liability or warranty for this information). If you have questions about a medical condition or this instruction, always ask your healthcare professional. [place] [address] any warranty or liability for your use of this information. Patient Education        Abscessed Tooth: Care Instructions  Your Care Instructions    An abscessed tooth is a tooth that has a pocket of pus in the tissues around it.  Pus forms when the body tries to fight an infection caused by bacteria. If the pus cannot drain, it forms an abscess. An abscessed tooth can cause red, swollen gums and throbbing pain, especially when you chew. You may have a bad taste in your mouth and a fever, and your jaw may swell. Damage to the tooth, untreated tooth decay, or gum disease can cause an abscessed tooth. An abscessed tooth needs to be treated by a dental professional right away. If it is not treated, the infection could spread to other parts of your body. Your dentist will give you antibiotics to stop the infection. He or she may make a hole in the tooth or cut open (miguel) the abscess inside your mouth so that the infection can drain, which should relieve your pain. You may need to have a root canal treatment, which tries to save your tooth by taking out the infected pulp and replacing it with a healing medicine and/or a filling. If these treatments do not work, your tooth may have to be removed. Follow-up care is a key part of your treatment and safety. Be sure to make and go to all appointments, and call your doctor if you are having problems. It's also a good idea to know your test results and keep a list of the medicines you take. How can you care for yourself at home? · Reduce pain and swelling in your face and jaw by putting ice or a cold pack on the outside of your cheek for 10 to 20 minutes at a time. Put a thin cloth between the ice and your skin. · Take pain medicines exactly as directed. ? If the doctor gave you a prescription medicine for pain, take it as prescribed. ? If you are not taking a prescription pain medicine, ask your doctor if you can take an over-the-counter medicine. · Take your antibiotics as directed. Do not stop taking them just because you feel better. You need to take the full course of antibiotics. To prevent tooth abscess  · Brush and floss every day, and have regular dental checkups. · Eat a healthy diet, and avoid sugary foods and drinks.   · Do not smoke, use e-cigarettes with nicotine, or use spit tobacco. Tobacco and nicotine slow your ability to heal. Tobacco also increases your risk for gum disease and cancer of the mouth and throat. If you need help quitting, talk to your doctor about stop-smoking programs and medicines. These can increase your chances of quitting for good. When should you call for help? Call 911 anytime you think you may need emergency care. For example, call if:    · You have trouble breathing.    Call your doctor now or seek immediate medical care if:    · You have new or worse symptoms of infection, such as:  ? Increased pain, swelling, warmth, or redness. ? Red streaks leading from the area. ? Pus draining from the area. ? A fever.    Watch closely for changes in your health, and be sure to contact your doctor if:    · You do not get better as expected. Where can you learn more? Go to http://mady-travon.info/. Enter X105 in the search box to learn more about \"Abscessed Tooth: Care Instructions. \"  Current as of: October 3, 2018  Content Version: 12.1  © 0521-1137 [employer]. Care instructions adapted under license by [employer] (which disclaims liability or warranty for this information). If you have questions about a medical condition or this instruction, always ask your healthcare professional. [place] [address] any warranty or liability for your use of this information. Pulses equal bilaterally, no edema present.

## 2019-09-05 ENCOUNTER — CLINICAL ADVICE (OUTPATIENT)
Age: 69
End: 2019-09-05

## 2019-09-10 ENCOUNTER — RX RENEWAL (OUTPATIENT)
Age: 69
End: 2019-09-10

## 2019-10-18 ENCOUNTER — RX RENEWAL (OUTPATIENT)
Age: 69
End: 2019-10-18

## 2019-10-24 ENCOUNTER — APPOINTMENT (OUTPATIENT)
Dept: GASTROENTEROLOGY | Facility: CLINIC | Age: 69
End: 2019-10-24

## 2019-10-25 ENCOUNTER — APPOINTMENT (OUTPATIENT)
Dept: GASTROENTEROLOGY | Facility: CLINIC | Age: 69
End: 2019-10-25
Payer: MEDICARE

## 2019-10-25 VITALS
HEIGHT: 70 IN | SYSTOLIC BLOOD PRESSURE: 119 MMHG | BODY MASS INDEX: 37.94 KG/M2 | DIASTOLIC BLOOD PRESSURE: 82 MMHG | WEIGHT: 265 LBS | HEART RATE: 118 BPM

## 2019-10-25 PROCEDURE — 99213 OFFICE O/P EST LOW 20 MIN: CPT

## 2019-10-25 NOTE — HISTORY OF PRESENT ILLNESS
[de-identified] : Ms. ANGELINA RAMIREZ is a 69 year old female with history of  collagenous colitis in the past. Patient has been maintained on Uceris with good control. Recently, patient has had multiple hospitalizations for cellulitis and lower extremity edema. Patient continues to have problems with diarrhea. Last colonoscopy was in 2013. Patient was started on cholestyramine for control of diarrhea with good response.\par

## 2019-10-25 NOTE — ASSESSMENT
[FreeTextEntry1] : 68 yo female with history of collagenous colitis and diarrhea. Continue cholestyramine. Schedule colonoscopy.

## 2019-10-25 NOTE — PHYSICAL EXAM
[Auscultation Breath Sounds / Voice Sounds] : lungs were clear to auscultation bilaterally [Heart Rate And Rhythm] : heart rate was normal and rhythm regular [Heart Sounds Gallop] : no gallops [Murmurs] : no murmurs [Heart Sounds] : normal S1 and S2 [Bowel Sounds] : normal bowel sounds [Heart Sounds Pericardial Friction Rub] : no pericardial rub [Abdomen Tenderness] : non-tender [Abdomen Soft] : soft [Abdomen Mass (___ Cm)] : no abdominal mass palpated [] : no hepato-splenomegaly [FreeTextEntry1] : Patient walking with a cane and bilateral LE edema

## 2019-10-29 ENCOUNTER — RX RENEWAL (OUTPATIENT)
Age: 69
End: 2019-10-29

## 2019-11-04 NOTE — PHYSICAL EXAM
[2+] : left 2+ [1+] : left 1+ [Ankle Swelling (On Exam)] : present [Ankle Swelling Bilaterally] : bilaterally  [] : bilaterally [Ankle Swelling On The Left] : moderate [Alert] : alert [Oriented to Person] : oriented to person [Oriented to Place] : oriented to place [Oriented to Time] : oriented to time [Calm] : calm [de-identified] : WD, WN, NAD. Awake, alert, interactive. Ambulates slowly with a cane for support. [de-identified] : LETICIA, PERSHASHAL [de-identified] : supple [de-identified] : right leg healed. Left leg with severe large areas of shallow ulcerations with necrosis.

## 2019-11-04 NOTE — ASSESSMENT
[FreeTextEntry1] : 67 y/o woman with lymphedema and stasis ulceration, with 2 wounds that are new in the dorsum of foot in addition to posterior calf. Overall today continues to improve. Cleansed wounds with soap and water and redressed with Xeroform and ace bandage. Granulating nicely and significantly smaller in size.\par \par Today the lower leg seems improving, will continue Santyl. Went to a cruise so legs a bit more swollen today, no cellulitis. Will continue with weekly dressings and monitor closely. Improved today. Still with pain. [Arterial/Venous Disease] : arterial/venous disease [Ulcer Care] : ulcer care

## 2019-11-07 ENCOUNTER — RESULT REVIEW (OUTPATIENT)
Age: 69
End: 2019-11-07

## 2019-11-07 ENCOUNTER — APPOINTMENT (OUTPATIENT)
Dept: GASTROENTEROLOGY | Facility: AMBULATORY MEDICAL SERVICES | Age: 69
End: 2019-11-07
Payer: MEDICARE

## 2019-11-07 PROCEDURE — 45380 COLONOSCOPY AND BIOPSY: CPT | Mod: 59

## 2019-11-07 PROCEDURE — 45385 COLONOSCOPY W/LESION REMOVAL: CPT

## 2019-11-12 ENCOUNTER — RX RENEWAL (OUTPATIENT)
Age: 69
End: 2019-11-12

## 2019-11-12 ENCOUNTER — RX CHANGE (OUTPATIENT)
Age: 69
End: 2019-11-12

## 2019-11-13 ENCOUNTER — APPOINTMENT (OUTPATIENT)
Dept: GASTROENTEROLOGY | Facility: CLINIC | Age: 69
End: 2019-11-13

## 2019-11-14 ENCOUNTER — APPOINTMENT (OUTPATIENT)
Dept: GASTROENTEROLOGY | Facility: CLINIC | Age: 69
End: 2019-11-14
Payer: MEDICARE

## 2019-11-14 VITALS — DIASTOLIC BLOOD PRESSURE: 102 MMHG | HEIGHT: 70 IN | SYSTOLIC BLOOD PRESSURE: 119 MMHG | HEART RATE: 98 BPM

## 2019-11-14 DIAGNOSIS — R11.0 NAUSEA: ICD-10-CM

## 2019-11-14 PROCEDURE — 99214 OFFICE O/P EST MOD 30 MIN: CPT

## 2019-11-14 NOTE — ASSESSMENT
[FreeTextEntry1] : 70 yo female with history of nausea, vomiting, and diarrhea. Patient to use Lomotil daily, and prn Zofran. UGI with SBFT to examine stomach and small bowel.

## 2019-11-14 NOTE — HISTORY OF PRESENT ILLNESS
[de-identified] : Ms. ANGELINA RAMIREZ is a 69 year old female with history of diarrhea. Colonoscopy recently showed no evidence of collagenous colitis although several benign polyps were removed. Patient continues to note daily diarrhea without cramping. Patient also also had problems with nausea and vomiting which is new within the last several weeks.\par

## 2019-11-14 NOTE — PHYSICAL EXAM
[Auscultation Breath Sounds / Voice Sounds] : lungs were clear to auscultation bilaterally [Heart Rate And Rhythm] : heart rate was normal and rhythm regular [Heart Sounds Gallop] : no gallops [Heart Sounds] : normal S1 and S2 [Murmurs] : no murmurs [Heart Sounds Pericardial Friction Rub] : no pericardial rub [Bowel Sounds] : normal bowel sounds [Abdomen Soft] : soft [] : no hepato-splenomegaly [Abdomen Mass (___ Cm)] : no abdominal mass palpated [Abdomen Tenderness] : non-tender [FreeTextEntry1] : Patient walking with a cane and bilateral LE edema

## 2019-11-19 DIAGNOSIS — K59.00 CONSTIPATION, UNSPECIFIED: ICD-10-CM

## 2019-11-22 ENCOUNTER — RX RENEWAL (OUTPATIENT)
Age: 69
End: 2019-11-22

## 2019-11-22 DIAGNOSIS — A04.72 ENTEROCOLITIS DUE TO CLOSTRIDIUM DIFFICILE, NOT SPECIFIED AS RECURRENT: ICD-10-CM

## 2019-11-22 LAB
C DIFF TOX GENS STL QL NAA+PROBE: NORMAL
CDIFF BY PCR: DETECTED
GI PCR PANEL, STOOL: NORMAL

## 2019-12-04 ENCOUNTER — RX RENEWAL (OUTPATIENT)
Age: 69
End: 2019-12-04

## 2020-01-28 RX ORDER — OXYCODONE AND ACETAMINOPHEN 5; 325 MG/1; MG/1
5-325 TABLET ORAL 3 TIMES DAILY
Qty: 21 | Refills: 0 | Status: COMPLETED | COMMUNITY
Start: 2018-10-31 | End: 2020-01-28

## 2020-01-28 RX ORDER — CEFUROXIME AXETIL 500 MG/1
500 TABLET ORAL
Qty: 21 | Refills: 0 | Status: COMPLETED | COMMUNITY
Start: 2019-02-08 | End: 2020-01-28

## 2020-01-28 RX ORDER — COLLAGENASE SANTYL 250 [ARB'U]/G
250 OINTMENT TOPICAL
Qty: 90 | Refills: 0 | Status: COMPLETED | COMMUNITY
Start: 2018-09-20 | End: 2020-01-28

## 2020-01-28 RX ORDER — CEFUROXIME AXETIL 500 MG/1
500 TABLET ORAL
Qty: 21 | Refills: 0 | Status: COMPLETED | COMMUNITY
Start: 2019-03-08 | End: 2020-01-28

## 2020-01-28 RX ORDER — CEFUROXIME AXETIL 500 MG/1
500 TABLET ORAL
Qty: 21 | Refills: 0 | Status: DISCONTINUED | COMMUNITY
Start: 2019-02-08 | End: 2020-01-28

## 2020-01-28 RX ORDER — DOXYCYCLINE HYCLATE 100 MG/1
100 TABLET ORAL
Qty: 6 | Refills: 0 | Status: COMPLETED | COMMUNITY
Start: 2018-12-28 | End: 2020-01-28

## 2020-01-28 RX ORDER — CEFUROXIME AXETIL 500 MG/1
500 TABLET ORAL
Qty: 21 | Refills: 0 | Status: DISCONTINUED | COMMUNITY
Start: 2019-03-22 | End: 2020-01-28

## 2020-01-28 RX ORDER — SILVER SULFADIAZINE 10 MG/G
1 CREAM TOPICAL
Qty: 100 | Refills: 0 | Status: COMPLETED | COMMUNITY
Start: 2018-08-27 | End: 2020-01-28

## 2020-01-28 RX ORDER — TRIAMCINOLONE ACETONIDE 1 MG/G
0.1 CREAM TOPICAL
Qty: 120 | Refills: 0 | Status: COMPLETED | COMMUNITY
Start: 2017-03-16 | End: 2020-01-28

## 2020-01-28 RX ORDER — POTASSIUM CHLORIDE 10 MEQ
CAPSULE, EXTENDED RELEASE ORAL
Refills: 0 | Status: COMPLETED | COMMUNITY
End: 2020-01-28

## 2020-01-28 RX ORDER — DOXYCYCLINE 100 MG/1
100 TABLET, FILM COATED ORAL
Qty: 20 | Refills: 0 | Status: DISCONTINUED | COMMUNITY
Start: 2018-11-13 | End: 2020-01-28

## 2020-01-28 RX ORDER — BUDESONIDE 9 MG/1
9 TABLET, EXTENDED RELEASE ORAL DAILY
Qty: 30 | Refills: 6 | Status: COMPLETED | COMMUNITY
Start: 2019-09-10 | End: 2020-01-28

## 2020-01-28 RX ORDER — POTASSIUM CHLORIDE 1500 MG/1
20 TABLET, EXTENDED RELEASE ORAL
Qty: 360 | Refills: 0 | Status: COMPLETED | COMMUNITY
Start: 2018-02-19 | End: 2020-01-28

## 2020-01-28 RX ORDER — VANCOMYCIN HYDROCHLORIDE 125 MG/1
125 CAPSULE ORAL
Qty: 56 | Refills: 0 | Status: COMPLETED | COMMUNITY
Start: 2019-11-22 | End: 2020-01-28

## 2020-01-28 RX ORDER — DOXYCYCLINE HYCLATE 100 MG/1
100 CAPSULE ORAL TWICE DAILY
Qty: 14 | Refills: 0 | Status: COMPLETED | COMMUNITY
Start: 2018-12-24 | End: 2020-01-28

## 2020-01-28 RX ORDER — TRIAMCINOLONE ACETONIDE 5 MG/G
0.5 CREAM TOPICAL
Qty: 90 | Refills: 0 | Status: COMPLETED | COMMUNITY
Start: 2018-08-11 | End: 2020-01-28

## 2020-01-28 RX ORDER — BUDESONIDE 9 MG/1
9 TABLET, EXTENDED RELEASE ORAL
Qty: 30 | Refills: 0 | Status: DISCONTINUED | COMMUNITY
Start: 2018-05-01 | End: 2020-01-28

## 2020-01-28 RX ORDER — BUDESONIDE 9 MG/1
9 TABLET, EXTENDED RELEASE ORAL DAILY
Qty: 90 | Refills: 3 | Status: DISCONTINUED | COMMUNITY
Start: 2019-10-15 | End: 2020-01-28

## 2020-01-31 ENCOUNTER — APPOINTMENT (OUTPATIENT)
Dept: VASCULAR SURGERY | Facility: CLINIC | Age: 70
End: 2020-01-31
Payer: MEDICARE

## 2020-01-31 VITALS
WEIGHT: 265 LBS | HEART RATE: 82 BPM | TEMPERATURE: 97.3 F | SYSTOLIC BLOOD PRESSURE: 144 MMHG | BODY MASS INDEX: 37.94 KG/M2 | DIASTOLIC BLOOD PRESSURE: 85 MMHG | OXYGEN SATURATION: 97 % | HEIGHT: 70 IN

## 2020-01-31 PROCEDURE — 99213 OFFICE O/P EST LOW 20 MIN: CPT

## 2020-02-03 ENCOUNTER — RX RENEWAL (OUTPATIENT)
Age: 70
End: 2020-02-03

## 2020-02-14 ENCOUNTER — APPOINTMENT (OUTPATIENT)
Dept: VASCULAR SURGERY | Facility: CLINIC | Age: 70
End: 2020-02-14
Payer: MEDICARE

## 2020-02-14 PROCEDURE — 29580 STRAPPING UNNA BOOT: CPT | Mod: 50

## 2020-02-14 PROCEDURE — 99213 OFFICE O/P EST LOW 20 MIN: CPT | Mod: 25

## 2020-02-14 RX ORDER — WARFARIN 7.5 MG/1
7.5 TABLET ORAL
Qty: 90 | Refills: 0 | Status: DISCONTINUED | COMMUNITY
Start: 2017-02-15 | End: 2020-02-14

## 2020-02-14 RX ORDER — GABAPENTIN 300 MG/1
300 CAPSULE ORAL
Qty: 30 | Refills: 3 | Status: DISCONTINUED | COMMUNITY
Start: 2018-10-22 | End: 2020-02-14

## 2020-02-14 RX ORDER — WARFARIN 5 MG/1
5 TABLET ORAL
Refills: 0 | Status: DISCONTINUED | COMMUNITY
End: 2020-02-14

## 2020-02-23 NOTE — PROGRESS NOTE ADULT - SUBJECTIVE AND OBJECTIVE BOX
CHIEF COMPLAINT:    SUBJECTIVE:   HPI  11/13/18  Patient is 68y F PMHx of DM, CHF, Venous Insufficiency, Ulcerative Colitis, HTN, HLD, presents to [facility] on 11/13/18, with fevers starting the night before admission and RLE pain. Patient with chronic ulcer of the posterior calf of the RLE, has been causing recurrent cellulitis with multiple hospital admissions since 8/18, patient admits that in the past couple of days her leg has been having increased swelling, erythema and pain. Pain is non-radiating constant severe burning pain with no alleviation to NSAIDs and Percocet. Patient admits that the pain is associated with chills, nausea with heaving no vomiting, as well as shortness of breath and palpitations, found to be in Afib RVR in the ED. Patient also admits some diarrhea but is associated with her UC.      ---  SUBJECTIVE   11/14/18  Patient was seen and evaluated at bedside. States RLE pain has been 10/10 with burning sensation and chills also present. No other complaints at this time. Potassium was repleted this AM.     -----  11/15/18  Overnight, patient desaturated to low 90s and was wheezing. Patient was given Lasix STAT and duoneb treatment. Lasix was subsequently ordered for 80 mg IV BID.  Patient seen today, breathing and wheezing improved and still complaining of RLE pain , with burning sensation.         REVIEW OF SYSTEMS:    CONSTITUTIONAL: No weakness, no fevers , no chills  EYES/ENT: No visual changes;  No vertigo or throat pain   NECK: No pain or stiffness  RESPIRATORY: No cough, wheezing, hemoptysis; No shortness of breath  CARDIOVASCULAR: No chest pain or palpitations  GASTROINTESTINAL: No abdominal or epigastric pain. No nausea, vomiting, or hematemesis; No diarrhea or constipation. No melena or hematochezia.  GENITOURINARY: No dysuria, frequency or hematuria  NEUROLOGICAL: No numbness or weakness  SKIN: No itching, burning, rashes, or lesions   EXTREMITIES: RLE pain  All other review of systems is negative unless indicated above    Vital Signs Last 24 Hrs  T(C): 37.4 (15 Nov 2018 10:45), Max: 37.4 (15 Nov 2018 10:45)  T(F): 99.4 (15 Nov 2018 10:45), Max: 99.4 (15 Nov 2018 10:45)  HR: 91 (15 Nov 2018 10:45) (83 - 109)  BP: 113/52 (15 Nov 2018 10:45) (113/52 - 156/89)  BP(mean): --  RR: 18 (15 Nov 2018 10:45) (18 - 20)  SpO2: 96% (15 Nov 2018 10:45) (92% - 96%)    I&O's Summary    CAPILLARY BLOOD GLUCOSE      POCT Blood Glucose.: 171 mg/dL (15 Nov 2018 11:10)  POCT Blood Glucose.: 173 mg/dL (15 Nov 2018 07:39)  POCT Blood Glucose.: 172 mg/dL (14 Nov 2018 21:34)  POCT Blood Glucose.: 133 mg/dL (14 Nov 2018 16:43)      PHYSICAL EXAM:    Constitutional: NAD, awake and alert, obese  Respiratory: Breath sounds are clear bilaterally, No wheezing, rales or rhonchi  Cardiovascular: S1 and S2, regular rate and rhythm, no Murmurs, gallops or rubs  Gastrointestinal: Bowel Sounds present, soft, nontender, nondistended, no guarding, no rebound  Extremities: LLE peripheral edema noted, with erythematous changes consistent with venous insufficiency. RLE tender to palpation and dressing intact   Vascular: 1+ peripheral pulses      MEDICATIONS  (STANDING):  ALBUTerol/ipratropium for Nebulization 3 milliLiter(s) Nebulizer every 6 hours  aspirin  chewable 81 milliGRAM(s) Oral daily  atorvastatin 10 milliGRAM(s) Oral at bedtime  cefepime   IVPB 2000 milliGRAM(s) IV Intermittent every 12 hours  dextrose 5%. 1000 milliLiter(s) (50 mL/Hr) IV Continuous <Continuous>  dextrose 50% Injectable 12.5 Gram(s) IV Push once  dextrose 50% Injectable 25 Gram(s) IV Push once  dextrose 50% Injectable 25 Gram(s) IV Push once  diltiazem    Tablet 120 milliGRAM(s) Oral two times a day  doxycycline IVPB 100 milliGRAM(s) IV Intermittent every 12 hours  doxycycline IVPB      ferrous    sulfate 325 milliGRAM(s) Oral daily  furosemide   Injectable 80 milliGRAM(s) IV Push every 12 hours  gabapentin 300 milliGRAM(s) Oral at bedtime  heparin  Injectable 5000 Unit(s) SubCutaneous every 12 hours  insulin lispro (HumaLOG) corrective regimen sliding scale   SubCutaneous three times a day before meals  insulin lispro (HumaLOG) corrective regimen sliding scale   SubCutaneous at bedtime  metoprolol succinate  milliGRAM(s) Oral daily  montelukast 10 milliGRAM(s) Oral at bedtime  morphine  - Injectable 4 milliGRAM(s) IV Push once  potassium chloride    Tablet ER 20 milliEquivalent(s) Oral every 12 hours  Uceris (Budesonide EC) 9 milliGRAM(s) 9 milliGRAM(s) Oral daily    MEDICATIONS  (PRN):  acetaminophen   Tablet .. 650 milliGRAM(s) Oral every 6 hours PRN Temp greater or equal to 38C (100.4F)  dextrose 40% Gel 15 Gram(s) Oral once PRN Blood Glucose LESS THAN 70 milliGRAM(s)/deciliter  dicyclomine 20 milliGRAM(s) Oral daily PRN abdominal pain  glucagon  Injectable 1 milliGRAM(s) IntraMuscular once PRN Glucose LESS THAN 70 milligrams/deciliter  morphine  - Injectable 1 milliGRAM(s) IV Push every 4 hours PRN Moderate Pain (4 - 6)  zolpidem 5 milliGRAM(s) Oral at bedtime PRN Insomnia      LABS: All Labs Reviewed:                                     11.6   9.68  )-----------( 285      ( 15 Nov 2018 05:43 )             36.5       11-15    140  |  106  |  6<L>  ----------------------------<  125<H>  3.4<L>   |  27  |  0.46<L>    Ca    8.2<L>      15 Nov 2018 05:43  Phos  3.1     11-15  Mg     1.6     11-15    TPro  7.2  /  Alb  2.7<L>  /  TBili  0.7  /  DBili  x   /  AST  44<H>  /  ALT  46  /  AlkPhos  71  11-13         PTT - ( 13 Nov 2018 16:00 )  PTT:39.1 sec  CARDIAC MARKERS ( 14 Nov 2018 05:25 )  x     / x     / 41 U/L / x     / x      CARDIAC MARKERS ( 13 Nov 2018 20:23 )  <0.015 ng/mL / x     / x     / x     / x      CARDIAC MARKERS ( 13 Nov 2018 16:33 )  <0.015 ng/mL / x     / 44 U/L / x     / x      CARDIAC MARKERS ( 13 Nov 2018 16:00 )  <0.015 ng/mL / x     / x     / x     / x          Blood Culture:     RADIOLOGY/EKG:    CT LOWER EXTREMITY W/ IV CONT        IMPRESSION:    1.  Circumferential soft tissue swelling about the calf with extension   into the foot. Nonspecific but can be seen in the setting of cellulitis.  2.  Focal area of hypodensity in the soft tissues over the base of the   first metatarsal concerning for phlegmonous change versus early abscess   formation.  3.  No CT evidence for osteomyelitis. If concern persists, MRI or bone   scan can be performed.        DVT PPX:    ADVANCED DIRECTIVE:    DISPOSITION: CHIEF COMPLAINT:    SUBJECTIVE:   HPI  11/13/18  Patient is 68y F PMHx of DM, CHF, Venous Insufficiency, Ulcerative Colitis, HTN, HLD, presents to [facility] on 11/13/18, with fevers starting the night before admission and RLE pain. Patient with chronic ulcer of the posterior calf of the RLE, has been causing recurrent cellulitis with multiple hospital admissions since 8/18, patient admits that in the past couple of days her leg has been having increased swelling, erythema and pain. Pain is non-radiating constant severe burning pain with no alleviation to NSAIDs and Percocet. Patient admits that the pain is associated with chills, nausea with heaving no vomiting, as well as shortness of breath and palpitations, found to be in Afib RVR in the ED. Patient also admits some diarrhea but is associated with her UC.      ---  SUBJECTIVE   11/14/18  Patient was seen and evaluated at bedside. States RLE pain has been 10/10 with burning sensation and chills also present. No other complaints at this time. Potassium was repleted this AM.     -----  11/15/18  Overnight, patient desaturated to low 90s and was wheezing. Patient was given Lasix STAT and duoneb treatment. Lasix was subsequently ordered for 80 mg IV BID.  Patient seen today, breathing and wheezing improved and still complaining of RLE pain , with burning sensation.   K repleted in the AM.        REVIEW OF SYSTEMS:    CONSTITUTIONAL: No weakness, no fevers , no chills  EYES/ENT: No visual changes;  No vertigo or throat pain   NECK: No pain or stiffness  RESPIRATORY: No cough, wheezing, hemoptysis; No shortness of breath  CARDIOVASCULAR: No chest pain or palpitations  GASTROINTESTINAL: No abdominal or epigastric pain. No nausea, vomiting, or hematemesis; No diarrhea or constipation. No melena or hematochezia.  GENITOURINARY: No dysuria, frequency or hematuria  NEUROLOGICAL: No numbness or weakness  SKIN: No itching, burning, rashes, or lesions   EXTREMITIES: RLE pain  All other review of systems is negative unless indicated above    Vital Signs Last 24 Hrs  T(C): 37.4 (15 Nov 2018 10:45), Max: 37.4 (15 Nov 2018 10:45)  T(F): 99.4 (15 Nov 2018 10:45), Max: 99.4 (15 Nov 2018 10:45)  HR: 91 (15 Nov 2018 10:45) (83 - 109)  BP: 113/52 (15 Nov 2018 10:45) (113/52 - 156/89)  BP(mean): --  RR: 18 (15 Nov 2018 10:45) (18 - 20)  SpO2: 96% (15 Nov 2018 10:45) (92% - 96%)    I&O's Summary    CAPILLARY BLOOD GLUCOSE      POCT Blood Glucose.: 171 mg/dL (15 Nov 2018 11:10)  POCT Blood Glucose.: 173 mg/dL (15 Nov 2018 07:39)  POCT Blood Glucose.: 172 mg/dL (14 Nov 2018 21:34)  POCT Blood Glucose.: 133 mg/dL (14 Nov 2018 16:43)      PHYSICAL EXAM:    Constitutional: NAD, awake and alert, obese  Respiratory: Breath sounds are clear bilaterally, No wheezing, rales or rhonchi  Cardiovascular: S1 and S2, regular rate and rhythm, no Murmurs, gallops or rubs  Gastrointestinal: Bowel Sounds present, soft, nontender, nondistended, no guarding, no rebound  Extremities: LLE peripheral edema noted, with erythematous changes consistent with venous insufficiency. RLE tender to palpation and dressing intact   Vascular: 1+ peripheral pulses      MEDICATIONS  (STANDING):  ALBUTerol/ipratropium for Nebulization 3 milliLiter(s) Nebulizer every 6 hours  aspirin  chewable 81 milliGRAM(s) Oral daily  atorvastatin 10 milliGRAM(s) Oral at bedtime  cefepime   IVPB 2000 milliGRAM(s) IV Intermittent every 12 hours  dextrose 5%. 1000 milliLiter(s) (50 mL/Hr) IV Continuous <Continuous>  dextrose 50% Injectable 12.5 Gram(s) IV Push once  dextrose 50% Injectable 25 Gram(s) IV Push once  dextrose 50% Injectable 25 Gram(s) IV Push once  diltiazem    Tablet 120 milliGRAM(s) Oral two times a day  doxycycline IVPB 100 milliGRAM(s) IV Intermittent every 12 hours  doxycycline IVPB      ferrous    sulfate 325 milliGRAM(s) Oral daily  furosemide   Injectable 80 milliGRAM(s) IV Push every 12 hours  gabapentin 300 milliGRAM(s) Oral at bedtime  heparin  Injectable 5000 Unit(s) SubCutaneous every 12 hours  insulin lispro (HumaLOG) corrective regimen sliding scale   SubCutaneous three times a day before meals  insulin lispro (HumaLOG) corrective regimen sliding scale   SubCutaneous at bedtime  metoprolol succinate  milliGRAM(s) Oral daily  montelukast 10 milliGRAM(s) Oral at bedtime  morphine  - Injectable 4 milliGRAM(s) IV Push once  potassium chloride    Tablet ER 20 milliEquivalent(s) Oral every 12 hours  Uceris (Budesonide EC) 9 milliGRAM(s) 9 milliGRAM(s) Oral daily    MEDICATIONS  (PRN):  acetaminophen   Tablet .. 650 milliGRAM(s) Oral every 6 hours PRN Temp greater or equal to 38C (100.4F)  dextrose 40% Gel 15 Gram(s) Oral once PRN Blood Glucose LESS THAN 70 milliGRAM(s)/deciliter  dicyclomine 20 milliGRAM(s) Oral daily PRN abdominal pain  glucagon  Injectable 1 milliGRAM(s) IntraMuscular once PRN Glucose LESS THAN 70 milligrams/deciliter  morphine  - Injectable 1 milliGRAM(s) IV Push every 4 hours PRN Moderate Pain (4 - 6)  zolpidem 5 milliGRAM(s) Oral at bedtime PRN Insomnia      LABS: All Labs Reviewed:                                     11.6   9.68  )-----------( 285      ( 15 Nov 2018 05:43 )             36.5       11-15    140  |  106  |  6<L>  ----------------------------<  125<H>  3.4<L>   |  27  |  0.46<L>    Ca    8.2<L>      15 Nov 2018 05:43  Phos  3.1     11-15  Mg     1.6     11-15    TPro  7.2  /  Alb  2.7<L>  /  TBili  0.7  /  DBili  x   /  AST  44<H>  /  ALT  46  /  AlkPhos  71  11-13         PTT - ( 13 Nov 2018 16:00 )  PTT:39.1 sec  CARDIAC MARKERS ( 14 Nov 2018 05:25 )  x     / x     / 41 U/L / x     / x      CARDIAC MARKERS ( 13 Nov 2018 20:23 )  <0.015 ng/mL / x     / x     / x     / x      CARDIAC MARKERS ( 13 Nov 2018 16:33 )  <0.015 ng/mL / x     / 44 U/L / x     / x      CARDIAC MARKERS ( 13 Nov 2018 16:00 )  <0.015 ng/mL / x     / x     / x     / x          Blood Culture:     RADIOLOGY/EKG:    CT LOWER EXTREMITY W/ IV CONT        IMPRESSION:    1.  Circumferential soft tissue swelling about the calf with extension   into the foot. Nonspecific but can be seen in the setting of cellulitis.  2.  Focal area of hypodensity in the soft tissues over the base of the   first metatarsal concerning for phlegmonous change versus early abscess   formation.  3.  No CT evidence for osteomyelitis. If concern persists, MRI or bone   scan can be performed.        DVT PPX:    ADVANCED DIRECTIVE:    DISPOSITION: 2.07

## 2020-02-28 ENCOUNTER — APPOINTMENT (OUTPATIENT)
Dept: VASCULAR SURGERY | Facility: CLINIC | Age: 70
End: 2020-02-28
Payer: MEDICARE

## 2020-02-28 VITALS
HEIGHT: 70 IN | SYSTOLIC BLOOD PRESSURE: 135 MMHG | WEIGHT: 265 LBS | OXYGEN SATURATION: 98 % | BODY MASS INDEX: 37.94 KG/M2 | DIASTOLIC BLOOD PRESSURE: 79 MMHG | HEART RATE: 79 BPM

## 2020-02-28 PROCEDURE — 99213 OFFICE O/P EST LOW 20 MIN: CPT

## 2020-02-28 RX ORDER — CIPROFLOXACIN HYDROCHLORIDE 250 MG/1
250 TABLET, FILM COATED ORAL
Qty: 10 | Refills: 0 | Status: DISCONTINUED | COMMUNITY
Start: 2020-02-06 | End: 2020-02-28

## 2020-02-28 RX ORDER — RIVAROXABAN 20 MG/1
20 TABLET, FILM COATED ORAL
Qty: 30 | Refills: 2 | Status: DISCONTINUED | COMMUNITY
Start: 2019-06-07 | End: 2020-02-28

## 2020-03-02 NOTE — ASSESSMENT
[Arterial/Venous Disease] : arterial/venous disease [Ulcer Care] : ulcer care [FreeTextEntry1] : 68 y/o woman with lymphedema, chronic venous stasis hyperpigmentation and dermatitis and peripheral neuropathy. I applied BLE Ace wraps to legs, she begin use of compression stockings and use conservative treatment and Lymphedema pumps daily. She may apply Clobetasol to legs to assist with itching from dermatitis.  Gabapentin to 600 mg TID will be continued for neuropathic pain. \par \par \par Plan:\par Continue Xarelto, ASA 81 mg and Pravastatin QD.\par Continue Gabapentin to 600 mg TID for neuropathic pain.\par Continue Clobetasol for dermatitis, use sparingly.\par Continue medical management of HTN.\par She will apply compression stockings daily and utilize conservative treatment..\par Ambulate frequently and elevate legs at rest\par RTC in 3 months.\par \par \par Patient seen and evaluated by Janet NORWOOD in conjunction with Dr Denis Carlson. I personally discussed this patient with the Nurse Practitioner at the time of the visit. I agree with the assessment and plan as written, unless noted below. I was present with the Nurse Practitioner during the key portions of the history and exam. I discussed the case with the Nurse Practitioner and agree with the findings and plan as documented in the Nurse Practitioner 's note, unless noted below.\par

## 2020-03-02 NOTE — HISTORY OF PRESENT ILLNESS
[FreeTextEntry1] : 70 y/o female with Hx of venous insufficiency, lymphedema and recurrent ulcers secondary to cellulitis. She is accompanied by her . Wounds to leg continue to remain healed. She wore Unna Boots for 1 week and then removed them and wore compression stockings for a week. Lymphedema persists but is improved with stockings and pumps. Chronic venous stasis hyperpigmentation and fibrosis persists to BLE. There is no weeping, drainage or ulceration present to legs. There is no pain with walking or at rest. Hx includes: DM2, HTN and A Fib. She takes ASA 81 mg, Xarelto 20 mg QD and Pravastatin 10 mg QD.  Gabapentin has helped control neuropathic pain. Patient is a non-smoker. No current pain reported.

## 2020-03-02 NOTE — PHYSICAL EXAM
[2+] : left 2+ [Ankle Swelling (On Exam)] : present [Ankle Swelling Bilaterally] : bilaterally  [] : bilaterally [Ankle Swelling On The Left] : moderate [Alert] : alert [Oriented to Person] : oriented to person [Oriented to Place] : oriented to place [Oriented to Time] : oriented to time [Calm] : calm [de-identified] : WD, WN, NAD. Awake, alert, interactive. Ambulates slowly with a cane for support. [de-identified] : supple [de-identified] : LETICIA, PERSHASHAL [de-identified] : right leg healed. Left leg with severe large areas of shallow ulcerations with necrosis.

## 2020-04-03 ENCOUNTER — RX RENEWAL (OUTPATIENT)
Age: 70
End: 2020-04-03

## 2020-05-08 ENCOUNTER — APPOINTMENT (OUTPATIENT)
Dept: VASCULAR SURGERY | Facility: CLINIC | Age: 70
End: 2020-05-08
Payer: MEDICARE

## 2020-05-08 VITALS
HEART RATE: 88 BPM | OXYGEN SATURATION: 99 % | HEIGHT: 70 IN | BODY MASS INDEX: 37.94 KG/M2 | TEMPERATURE: 97.4 F | DIASTOLIC BLOOD PRESSURE: 91 MMHG | SYSTOLIC BLOOD PRESSURE: 158 MMHG | WEIGHT: 265 LBS

## 2020-05-08 PROCEDURE — 99213 OFFICE O/P EST LOW 20 MIN: CPT

## 2020-05-08 RX ORDER — ASPIRIN 81 MG
81 TABLET, DELAYED RELEASE (ENTERIC COATED) ORAL
Refills: 0 | Status: DISCONTINUED | COMMUNITY
End: 2020-05-08

## 2020-05-08 RX ORDER — CHOLESTYRAMINE 4 G/9G
4 POWDER, FOR SUSPENSION ORAL
Qty: 60 | Refills: 3 | Status: DISCONTINUED | COMMUNITY
Start: 2019-10-18 | End: 2020-05-08

## 2020-05-12 ENCOUNTER — APPOINTMENT (OUTPATIENT)
Dept: NEUROLOGY | Facility: CLINIC | Age: 70
End: 2020-05-12

## 2020-05-13 NOTE — PHYSICAL EXAM
[2+] : left 2+ [Ankle Swelling (On Exam)] : present [Ankle Swelling Bilaterally] : bilaterally  [] : bilaterally [Alert] : alert [Ankle Swelling On The Left] : moderate [Oriented to Person] : oriented to person [Oriented to Place] : oriented to place [Oriented to Time] : oriented to time [Calm] : calm [de-identified] : supple [de-identified] : WD, WN, NAD. Awake, alert, interactive. Ambulates slowly with a cane for support. [de-identified] : LETICIA, PERSHASHAL [de-identified] : right leg healed. Left leg with severe large areas of shallow ulcerations with necrosis.

## 2020-05-13 NOTE — ASSESSMENT
[FreeTextEntry1] : 68 y/o woman with lymphedema, chronic venous stasis hyperpigmentation and dermatitis and peripheral neuropathy. I applied BLE Ace wraps to legs, she begin use of compression stockings and use conservative treatment and Lymphedema pumps daily. Will monitor with compression.\par \par \par Plan:\par Continue meds\par Continue medical management of HTN.\par She will apply compression stockings daily and utilize conservative treatment..\par Ambulate frequently and elevate legs at rest\par RTC in 3-4 weeks.\par \par \par Patient seen and evaluated by Janet NORWOOD in conjunction with Dr Denis Carlson. I personally discussed this patient with the Nurse Practitioner at the time of the visit. I agree with the assessment and plan as written, unless noted below. I was present with the Nurse Practitioner during the key portions of the history and exam. I discussed the case with the Nurse Practitioner and agree with the findings and plan as documented in the Nurse Practitioner 's note, unless noted below.\par  [Arterial/Venous Disease] : arterial/venous disease [Ulcer Care] : ulcer care

## 2020-05-13 NOTE — HISTORY OF PRESENT ILLNESS
[FreeTextEntry1] : 68 y/o female with Hx of venous insufficiency, lymphedema and recurrent ulcers secondary to cellulitis. She is accompanied by her . Wounds to leg continue to remain healed. She wore Unna Boots for 1 week and then removed them and wore compression stockings for a week. Lymphedema persists but is improved with stockings and pumps. Chronic venous stasis hyperpigmentation and fibrosis persists to BLE. There is no weeping, drainage or ulceration present to legs. There is no pain with walking or at rest. Hx includes: DM2, HTN and A Fib. She takes ASA 81 mg, Xarelto 20 mg QD and Pravastatin 10 mg QD.  Gabapentin has helped control neuropathic pain. Patient is a non-smoker. No current pain reported.

## 2020-05-29 ENCOUNTER — APPOINTMENT (OUTPATIENT)
Dept: VASCULAR SURGERY | Facility: CLINIC | Age: 70
End: 2020-05-29
Payer: MEDICARE

## 2020-05-29 VITALS
HEIGHT: 70 IN | SYSTOLIC BLOOD PRESSURE: 146 MMHG | WEIGHT: 265 LBS | OXYGEN SATURATION: 98 % | TEMPERATURE: 98.8 F | BODY MASS INDEX: 37.94 KG/M2 | HEART RATE: 78 BPM | DIASTOLIC BLOOD PRESSURE: 74 MMHG

## 2020-05-29 PROCEDURE — 99213 OFFICE O/P EST LOW 20 MIN: CPT

## 2020-05-29 RX ORDER — PREGABALIN 50 MG/1
50 CAPSULE ORAL
Qty: 90 | Refills: 3 | Status: DISCONTINUED | COMMUNITY
Start: 2020-05-08 | End: 2020-05-29

## 2020-06-05 ENCOUNTER — APPOINTMENT (OUTPATIENT)
Dept: VASCULAR SURGERY | Facility: CLINIC | Age: 70
End: 2020-06-05
Payer: MEDICARE

## 2020-06-05 VITALS
WEIGHT: 265 LBS | HEIGHT: 70 IN | HEART RATE: 78 BPM | DIASTOLIC BLOOD PRESSURE: 85 MMHG | OXYGEN SATURATION: 97 % | SYSTOLIC BLOOD PRESSURE: 165 MMHG | TEMPERATURE: 98.6 F | BODY MASS INDEX: 37.94 KG/M2

## 2020-06-05 PROCEDURE — 99213 OFFICE O/P EST LOW 20 MIN: CPT

## 2020-07-08 ENCOUNTER — APPOINTMENT (OUTPATIENT)
Dept: VASCULAR SURGERY | Facility: CLINIC | Age: 70
End: 2020-07-08
Payer: MEDICARE

## 2020-07-08 VITALS
HEIGHT: 70 IN | SYSTOLIC BLOOD PRESSURE: 137 MMHG | BODY MASS INDEX: 37.94 KG/M2 | OXYGEN SATURATION: 95 % | DIASTOLIC BLOOD PRESSURE: 76 MMHG | TEMPERATURE: 99.6 F | HEART RATE: 82 BPM | WEIGHT: 265 LBS

## 2020-07-08 PROCEDURE — 99213 OFFICE O/P EST LOW 20 MIN: CPT

## 2020-07-31 NOTE — PROGRESS NOTE ADULT - ASSESSMENT
[FreeTextEntry1] : This note was written by Taylor Dixon on 07/30/2020 acting as scribe for Chance Biggs III, MD  68 year old woman with the above history admitted with fever and shortness of breath.  There are new abnormal findings in the right lung on CT of the chest.  Consider pulmonary consult.  She was given IV fluids in the ER with slowing of her atrial fibrillation rate.  Will hold on diuretics at the present time.  Will discuss with hospitalist regarding her current status.  No evidence for ACS    01/01/2019:  Patient appears stable from a cardiac standpoint.  Continue present management.  Resume diuretics as an outpatient.

## 2020-08-14 ENCOUNTER — APPOINTMENT (OUTPATIENT)
Dept: VASCULAR SURGERY | Facility: CLINIC | Age: 70
End: 2020-08-14
Payer: MEDICARE

## 2020-08-14 VITALS
TEMPERATURE: 97.3 F | HEIGHT: 70 IN | WEIGHT: 265 LBS | HEART RATE: 109 BPM | BODY MASS INDEX: 37.94 KG/M2 | OXYGEN SATURATION: 96 % | SYSTOLIC BLOOD PRESSURE: 145 MMHG | DIASTOLIC BLOOD PRESSURE: 78 MMHG

## 2020-08-14 PROCEDURE — 99213 OFFICE O/P EST LOW 20 MIN: CPT

## 2020-08-14 PROCEDURE — 93970 EXTREMITY STUDY: CPT

## 2020-09-16 NOTE — ASU PATIENT PROFILE, ADULT - ATTEMPT TO OOB
Render Number Of Lesions Treated: no Consent was obtained and risks were reviewed including but not limited to scarring, infection, bleeding, scabbing, incomplete removal, and allergy to anesthesia. Post-Care Instructions: I reviewed with the patient in detail post-care instructions. Patient is to keep the treatment areas dry overnight, and then apply bacitracin twice daily until healed. Patient may apply hydrogen peroxide soaks to remove any crusting. Detail Level: Detailed Extraction Method: 11 blade and comedo extractor Prep Text (Optional): Prior to removal the treatment areas were prepped in the usual fashion. Acne Type: Comedonal Lesions no

## 2020-09-28 NOTE — ASSESSMENT
[FreeTextEntry1] : 70 y/o woman with lymphedema, chronic venous stasis hyperpigmentation and dermatitis and peripheral neuropathy. She will continue taking Prednisone 10 mg QD for pain and edema. She may restart Gabapentin 600 mg TID for paresthesias and Tylenol PRN pain. She will continue with conservative treatment: compression stockings and Lymphedema pumps daily. She may apply Triamcinolone to legs to assist with itching from dermatitis. \par \par Plan:\par -Prednisone 10 mg QD for pain and inflammation.\par -Continue Coumadin, ASA 81 mg and Pravastatin QD.\par -Continue Gabapentin to 600 mg TID for neuropathic pain.\par -Continue Clobetasol for dermatitis, use sparingly.\par Continue medical management of HTN.\par She will apply compression stockings daily and utilize conservative treatment..\par Ambulate frequently and elevate legs at rest\par RTC in 3-4 weeks. [Arterial/Venous Disease] : arterial/venous disease [Ulcer Care] : ulcer care

## 2020-09-28 NOTE — HISTORY OF PRESENT ILLNESS
[FreeTextEntry1] : 68 y/o female with Hx of venous insufficiency, lymphedema and recurrent ulcers. She reports improvement in pain and swelling with use of Prednisone, but not with itch. She has been taking Tylenol for pain but would like to restart Gabapentin 600 mg TID for abnormal sensations. She utilizes compression stockings and lymphedema pumps. There is no pain to legs with walking or at rest, burning, tingling pain to her legs is bothersome at night. \par Hx includes: DM2, HTN and A Fib. She takes ASA 81 mg, Coumadin and Pravastatin 10 mg QD.  Patient is a non-smoker. No current pain reported.

## 2020-09-28 NOTE — PHYSICAL EXAM
[2+] : left 2+ [Ankle Swelling (On Exam)] : present [Ankle Swelling Bilaterally] : bilaterally  [] : bilaterally [Ankle Swelling On The Left] : moderate [Alert] : alert [Oriented to Person] : oriented to person [Oriented to Place] : oriented to place [Oriented to Time] : oriented to time [Calm] : calm [de-identified] : WD, WN, NAD. Awake, alert, interactive. Ambulates slowly with a cane for support. [de-identified] : LETICIA, PERSHASHAL [de-identified] : supple [de-identified] : no cyanosis or deformity. full ROM, MS 5/5\par  [de-identified] : WWP. Chronic venous stasis hyperpigmentation persists distally. No wounds, ulcers or weeping. Moderate lymphedema. No s/s of infection.

## 2020-10-09 ENCOUNTER — APPOINTMENT (OUTPATIENT)
Dept: VASCULAR SURGERY | Facility: CLINIC | Age: 70
End: 2020-10-09
Payer: MEDICARE

## 2020-10-09 VITALS
HEART RATE: 87 BPM | SYSTOLIC BLOOD PRESSURE: 129 MMHG | OXYGEN SATURATION: 96 % | HEIGHT: 70 IN | BODY MASS INDEX: 37.94 KG/M2 | WEIGHT: 265 LBS | DIASTOLIC BLOOD PRESSURE: 81 MMHG | TEMPERATURE: 98 F

## 2020-10-09 PROCEDURE — 99213 OFFICE O/P EST LOW 20 MIN: CPT

## 2020-10-30 ENCOUNTER — APPOINTMENT (OUTPATIENT)
Dept: OTOLARYNGOLOGY | Facility: CLINIC | Age: 70
End: 2020-10-30
Payer: MEDICARE

## 2020-10-30 VITALS
HEART RATE: 50 BPM | DIASTOLIC BLOOD PRESSURE: 79 MMHG | SYSTOLIC BLOOD PRESSURE: 112 MMHG | HEIGHT: 70 IN | TEMPERATURE: 97.7 F | BODY MASS INDEX: 34.36 KG/M2 | WEIGHT: 240 LBS

## 2020-10-30 DIAGNOSIS — H91.21 SUDDEN IDIOPATHIC HEARING LOSS, RIGHT EAR: ICD-10-CM

## 2020-10-30 DIAGNOSIS — J34.89 OTHER SPECIFIED DISORDERS OF NOSE AND NASAL SINUSES: ICD-10-CM

## 2020-10-30 DIAGNOSIS — H69.81 OTHER SPECIFIED DISORDERS OF EUSTACHIAN TUBE, RIGHT EAR: ICD-10-CM

## 2020-10-30 DIAGNOSIS — H90.3 SENSORINEURAL HEARING LOSS, BILATERAL: ICD-10-CM

## 2020-10-30 PROCEDURE — 92567 TYMPANOMETRY: CPT

## 2020-10-30 PROCEDURE — 99204 OFFICE O/P NEW MOD 45 MIN: CPT

## 2020-10-30 PROCEDURE — 92557 COMPREHENSIVE HEARING TEST: CPT

## 2020-10-30 NOTE — HISTORY OF PRESENT ILLNESS
[de-identified] : co ad plugging and reduced hearing\par co gradual hearing loss no tinnitus\par co nasal sores

## 2020-10-30 NOTE — REVIEW OF SYSTEMS
[Negative] : Heme/Lymph [Ear Pain] : ear pain [Ear Itch] : ear itch [Nasal Congestion] : nasal congestion [Nose Bleeds] : nose bleeds [Throat Clearing] : throat clearing [Throat Dryness] : throat dryness [Throat Itching] : throat itching [Shortness Of Breath] : shortness of breath [Patient Intake Form Reviewed] : Patient intake form was reviewed [Hearing Loss] : no hearing loss [Recurrent Ear Infections] : no recurrent ear infections [Recurrent Sinus Infections] : no recurrent sinus infections [Problem Snoring] : no snoring problems [FreeTextEntry1] : chills

## 2020-10-30 NOTE — PHYSICAL EXAM
[Midline] : trachea located in midline position [Normal] : no rashes [de-identified] : cerumen small amount clered ad

## 2020-10-30 NOTE — ASSESSMENT
[FreeTextEntry1] : nasal vestibulitis-vaseline tid\par \par audio moderate au sn loss a/a tymps mild asymmetry w low frequency loss ad\par pred 10 qid and taper\par diabetes precaution,\par fu 2 weeks\par rec hearing aid eval and fitting

## 2020-11-13 ENCOUNTER — APPOINTMENT (OUTPATIENT)
Dept: OTOLARYNGOLOGY | Facility: CLINIC | Age: 70
End: 2020-11-13

## 2020-11-20 NOTE — PATIENT PROFILE ADULT - BRADEN ACTIVITY
(3) walks occasionally Opioid Counseling: I discussed with the patient the potential side effects of opioids including but not limited to addiction, altered mental status, and depression. I stressed avoiding alcohol, benzodiazepines, muscle relaxants and sleep aids unless specifically okayed by a physician. The patient verbalized understanding of the proper use and possible adverse effects of opioids. All of the patient's questions and concerns were addressed. They were instructed to flush the remaining pills down the toilet if they did not need them for pain.

## 2020-12-03 ENCOUNTER — APPOINTMENT (OUTPATIENT)
Dept: GASTROENTEROLOGY | Facility: CLINIC | Age: 70
End: 2020-12-03

## 2020-12-03 NOTE — PROGRESS NOTE ADULT - ASSESSMENT
Patient voiced understanding to start ditropan 10 mg xl daily. 67 y/o F PMHx Afib (on coumadin), CHF (unknown EF), DM, ulcerative colitis, HTN, PVD presents to ED sent in by vascular due to worsening B/L LE cellulitis and edema x2 weeks being admitted for cellulitis    #Cellulitis  -Continue IV antibiotics  -ID consulted, appreciate recommendations  -F/U blood cx, urine cx  -F/U Am labs  -Pain management    #Afib  -Continue coumadin 7.5mg tonight  -Continue cardizem and metoprolol  -Monitor BP  -F/U PT/INR in AM    #SOB- resolved  #CHF  -SOB likely 2/2 CHF vs. morbid obesity  -Lasix IV  -Daily weights  -Strict Is and Os    #DM  -FS AC and HS  -Low dose ISS  -F/U HgbA1c in AM    #Ulcerative colitis  -Continue home medications    #HTN  -Continue home medications with hold parameters  -Monitor BP    #DVT ppx  -On coumadin for Afib- therapeutic INR    Total time spent: 45 minutes 69 y/o F PMHx Afib (on coumadin), CHF (unknown EF), DM, ulcerative colitis, HTN, PVD presents to ED sent in by vascular due to worsening B/L LE cellulitis and edema x2 weeks being admitted for cellulitis    #Cellulitis  -Continue IV antibiotics  -ID consulted, appreciate recommendations  -F/U blood cx, urine cx  -F/U Am labs  -Pain management    #Afib  -Continue coumadin 7.5mg tonight  -Continue cardizem and metoprolol  -Monitor BP  -F/U PT/INR in AM    #SOB- resolved  #CHF  -SOB likely 2/2 CHF vs. morbid obesity  -Lasix IV  -Daily weights  -Strict Is and Os    #Transaminitis  -Unknown etiology  -Will check liver US  -Hold hepatotoxic medications  -F/U repeat CMP in AM    #DM  -FS AC and HS  -Low dose ISS  -F/U HgbA1c in AM    #Ulcerative colitis  -Continue home medications    #HTN  -Continue home medications with hold parameters  -Monitor BP    #DVT ppx  -On coumadin for Afib- therapeutic INR    Total time spent: 45 minutes

## 2020-12-11 ENCOUNTER — APPOINTMENT (OUTPATIENT)
Dept: VASCULAR SURGERY | Facility: CLINIC | Age: 70
End: 2020-12-11
Payer: MEDICARE

## 2020-12-11 VITALS
BODY MASS INDEX: 34.36 KG/M2 | HEIGHT: 70 IN | SYSTOLIC BLOOD PRESSURE: 121 MMHG | HEART RATE: 89 BPM | TEMPERATURE: 98.2 F | DIASTOLIC BLOOD PRESSURE: 78 MMHG | OXYGEN SATURATION: 96 % | WEIGHT: 240 LBS

## 2020-12-11 PROCEDURE — 99213 OFFICE O/P EST LOW 20 MIN: CPT

## 2021-01-20 ENCOUNTER — APPOINTMENT (OUTPATIENT)
Dept: GASTROENTEROLOGY | Facility: CLINIC | Age: 71
End: 2021-01-20
Payer: MEDICARE

## 2021-01-20 VITALS
HEART RATE: 97 BPM | SYSTOLIC BLOOD PRESSURE: 165 MMHG | DIASTOLIC BLOOD PRESSURE: 92 MMHG | WEIGHT: 240 LBS | HEIGHT: 70 IN | BODY MASS INDEX: 34.36 KG/M2

## 2021-01-20 DIAGNOSIS — R19.7 DIARRHEA, UNSPECIFIED: ICD-10-CM

## 2021-01-20 PROCEDURE — 99213 OFFICE O/P EST LOW 20 MIN: CPT

## 2021-01-20 NOTE — PHYSICAL EXAM
[General Appearance - Alert] : alert [General Appearance - In No Acute Distress] : in no acute distress [Auscultation Breath Sounds / Voice Sounds] : lungs were clear to auscultation bilaterally [Heart Rate And Rhythm] : heart rate was normal and rhythm regular [Heart Sounds] : normal S1 and S2 [Heart Sounds Gallop] : no gallops [Murmurs] : no murmurs [Heart Sounds Pericardial Friction Rub] : no pericardial rub [Bowel Sounds] : normal bowel sounds [Abdomen Soft] : soft [Abdomen Tenderness] : non-tender [] : no hepato-splenomegaly [Abdomen Mass (___ Cm)] : no abdominal mass palpated [FreeTextEntry1] : Healing chronic left leg wound

## 2021-01-20 NOTE — HISTORY OF PRESENT ILLNESS
[de-identified] : Ms. ANGELINA RAMIREZ is a 70 year old female with History of collagenous colitis. Patient has been feeling symptomatically well on regimen of cholestyramine once daily. Patient has had marked improvement in healing of leg site L. No other significant changes in medical history.\par

## 2021-01-20 NOTE — ASSESSMENT
[FreeTextEntry1] : 69 yo female with well controlled diarrhea on cholestyramine. Will continue current regimen. Follow up in one year.

## 2021-03-04 NOTE — DIETITIAN INITIAL EVALUATION ADULT. - NUTRITION DIAGNOSTIC TERMINOLOGY #1
Patient notified that he can take omeprazole OTC and divide his meals into smaller and more frequent throughout the day, per [facility].  He was instructed to call if worsen or does not improve . Patient agreed. .   Knowledge and Beliefs

## 2021-03-12 ENCOUNTER — APPOINTMENT (OUTPATIENT)
Dept: VASCULAR SURGERY | Facility: CLINIC | Age: 71
End: 2021-03-12
Payer: MEDICARE

## 2021-03-12 DIAGNOSIS — L81.9 DISORDER OF PIGMENTATION, UNSPECIFIED: ICD-10-CM

## 2021-03-12 PROCEDURE — 99213 OFFICE O/P EST LOW 20 MIN: CPT

## 2021-03-12 RX ORDER — GABAPENTIN 600 MG/1
600 TABLET, COATED ORAL 3 TIMES DAILY
Qty: 270 | Refills: 1 | Status: DISCONTINUED | COMMUNITY
Start: 2020-02-14 | End: 2021-03-12

## 2021-04-16 RX ORDER — GABAPENTIN 800 MG/1
800 TABLET, COATED ORAL 3 TIMES DAILY
Qty: 270 | Refills: 3 | Status: DISCONTINUED | COMMUNITY
Start: 2021-03-12 | End: 2021-04-16

## 2021-04-16 RX ORDER — METHYLPREDNISOLONE 4 MG/1
4 TABLET ORAL
Qty: 1 | Refills: 2 | Status: COMPLETED | COMMUNITY
Start: 2020-05-27 | End: 2021-04-16

## 2021-04-16 RX ORDER — PREDNISONE 10 MG/1
10 TABLET ORAL DAILY
Qty: 90 | Refills: 2 | Status: COMPLETED | COMMUNITY
Start: 2021-03-12 | End: 2021-04-16

## 2021-04-16 RX ORDER — PREDNISONE 10 MG/1
10 TABLET ORAL
Qty: 30 | Refills: 1 | Status: COMPLETED | COMMUNITY
Start: 2020-10-30 | End: 2021-04-16

## 2021-04-16 RX ORDER — PREDNISONE 10 MG/1
10 TABLET ORAL DAILY
Qty: 30 | Refills: 5 | Status: COMPLETED | COMMUNITY
Start: 2020-05-29 | End: 2021-04-16

## 2021-06-09 ENCOUNTER — APPOINTMENT (OUTPATIENT)
Dept: VASCULAR SURGERY | Facility: CLINIC | Age: 71
End: 2021-06-09
Payer: MEDICARE

## 2021-06-09 VITALS
BODY MASS INDEX: 35.5 KG/M2 | DIASTOLIC BLOOD PRESSURE: 70 MMHG | HEART RATE: 96 BPM | OXYGEN SATURATION: 95 % | WEIGHT: 248 LBS | SYSTOLIC BLOOD PRESSURE: 134 MMHG | HEIGHT: 70 IN

## 2021-06-09 PROCEDURE — 99213 OFFICE O/P EST LOW 20 MIN: CPT

## 2021-07-14 ENCOUNTER — APPOINTMENT (OUTPATIENT)
Dept: VASCULAR SURGERY | Facility: CLINIC | Age: 71
End: 2021-07-14
Payer: MEDICARE

## 2021-07-14 PROCEDURE — 99213 OFFICE O/P EST LOW 20 MIN: CPT

## 2021-07-28 ENCOUNTER — APPOINTMENT (OUTPATIENT)
Dept: VASCULAR SURGERY | Facility: CLINIC | Age: 71
End: 2021-07-28

## 2021-08-06 ENCOUNTER — APPOINTMENT (OUTPATIENT)
Dept: VASCULAR SURGERY | Facility: CLINIC | Age: 71
End: 2021-08-06
Payer: MEDICARE

## 2021-08-06 VITALS — DIASTOLIC BLOOD PRESSURE: 82 MMHG | OXYGEN SATURATION: 97 % | SYSTOLIC BLOOD PRESSURE: 154 MMHG | HEART RATE: 90 BPM

## 2021-08-06 DIAGNOSIS — I83.93 ASYMPTOMATIC VARICOSE VEINS OF BILATERAL LOWER EXTREMITIES: ICD-10-CM

## 2021-08-06 PROCEDURE — 99213 OFFICE O/P EST LOW 20 MIN: CPT

## 2021-08-23 NOTE — HISTORY OF PRESENT ILLNESS
[FreeTextEntry1] : 71 y/o female with Hx of venous insufficiency, lymphedema and recurrent ulcers. She reports improvement in swelling but pain persists. She has been taking Tylenol and Gabapentin 600 mg TID for pain. She utilizes compression stockings and lymphedema pumps daily. She has no difficulty walking or ambulating, Pain is described as burning and tingling pain to her legs which is bothersome at night. She has been seeing a pain management specialist, but has not had relief of pain. Hx includes: DM2, HTN and A Fib. She takes ASA 81 mg, Coumadin and Pravastatin 10 mg QD. No current pain reported. No fever or chills. She is a non-smoker.

## 2021-08-23 NOTE — ASSESSMENT
[FreeTextEntry1] : 71 y/o woman with lymphedema, chronic venous stasis hyperpigmentation and dermatitis and peripheral neuropathy.  I will prescribe Medrol and increase Gabapentin for neuropathic pain. She may take Tylenol. \par \par Plan:\par - Continue Coumadin, ASA 81 mg and Pravastatin QD.\par \par - Continue Gabapentin to 600 mg TID for neuropathic pain.\par - Continue Clobetasol for dermatitis, use sparingly.\par - Continue medical management of HTN.\par - Applied a LLE Ace wrap for control of edema, she may remove after 2 days and use her compression stocking.\par - She will apply compression stockings daily and utilize conservative treatment..\par - Ambulate frequently and elevate legs at rest.\par - Continue with pain management.\par - RTC in 2-3 weeks.\par \par

## 2021-08-23 NOTE — PHYSICAL EXAM
[2+] : left 2+ [Ankle Swelling (On Exam)] : present [Ankle Swelling Bilaterally] : bilaterally  [] : bilaterally [Ankle Swelling On The Left] : moderate [Alert] : alert [Oriented to Person] : oriented to person [Oriented to Place] : oriented to place [Oriented to Time] : oriented to time [Calm] : calm [de-identified] : WD, WN, NAD. Awake, alert, interactive. Ambulates slowly with a cane for support. [de-identified] : LETICIA, PERSHASHAL [de-identified] : supple [FreeTextEntry1] : edema LLE>RLE [de-identified] : no cyanosis or deformity. full ROM, MS 5/5\par  [de-identified] : WWP. Chronic venous stasis hyperpigmentation persists distally. No wounds, ulcers or weeping. Moderate lymphedema. No s/s of infection.

## 2021-08-27 ENCOUNTER — APPOINTMENT (OUTPATIENT)
Dept: VASCULAR SURGERY | Facility: CLINIC | Age: 71
End: 2021-08-27

## 2021-08-31 NOTE — ASSESSMENT
[FreeTextEntry1] : 69 y/o woman with lymphedema, chronic venous stasis hyperpigmentation and dermatitis and peripheral neuropathy.  I will prescribe Medrol and increase Gabapentin for neuropathic pain. She may take Tylenol. \par \par Plan:\par - Continue Coumadin, ASA 81 mg and Pravastatin QD.\par \par - Continue Gabapentin to 600 mg TID for neuropathic pain.\par - Continue Clobetasol for dermatitis, use sparingly.\par - Continue medical management of HTN.\par - Applied a LLE Ace wrap for control of edema, she may remove after 2 days and use her compression stocking.\par - She will apply compression stockings daily and utilize conservative treatment..\par - Ambulate frequently and elevate legs at rest.\par - Continue with pain management.\par - RTC in 2-3 weeks.\par \par

## 2021-08-31 NOTE — PHYSICAL EXAM
[2+] : left 2+ [Ankle Swelling (On Exam)] : present [Ankle Swelling Bilaterally] : bilaterally  [] : bilaterally [Ankle Swelling On The Left] : moderate [Alert] : alert [Oriented to Person] : oriented to person [Oriented to Place] : oriented to place [Oriented to Time] : oriented to time [Calm] : calm [de-identified] : WD, WN, NAD. Awake, alert, interactive. Ambulates slowly with a cane for support. [de-identified] : LETICIA, PERSHASHAL [de-identified] : supple [FreeTextEntry1] : edema LLE>RLE [de-identified] : no cyanosis or deformity. full ROM, MS 5/5\par  [de-identified] : WWP. Chronic venous stasis hyperpigmentation persists distally. No wounds, ulcers or weeping. Moderate lymphedema. No s/s of infection.

## 2021-11-17 NOTE — PATIENT PROFILE ADULT. - CAREGIVER
Detail Level: Simple Fluence Units: mJ/cm2 Post-Care Instructions: I reviewed with the patient in detail post-care instructions. Patient should stay away from the sun and wear sun protection until treated areas are fully healed. Comments: Treatment performed by Gilda Tobias RN. Spot Size: 2 x 2 cm Treatment Number: 30 Fluence #1 (J/Cm2 Or Mj/Cm2): 4763 Mode: repeat paint Consent: Written consent obtained, risks reviewed including but not limited to crusting, scabbing, blistering, scarring, darker or lighter pigmentary change, incidental hair removal, bruising, and/or incomplete removal. Location #1: BL knees, BL elbows Total Square Area In Cm2 (Required For Proper Billing- Whole Numbers Only Please): 64 No

## 2021-12-02 ENCOUNTER — APPOINTMENT (OUTPATIENT)
Dept: NEUROLOGY | Facility: CLINIC | Age: 71
End: 2021-12-02
Payer: MEDICARE

## 2021-12-02 ENCOUNTER — NON-APPOINTMENT (OUTPATIENT)
Age: 71
End: 2021-12-02

## 2021-12-02 VITALS
SYSTOLIC BLOOD PRESSURE: 132 MMHG | HEART RATE: 77 BPM | DIASTOLIC BLOOD PRESSURE: 82 MMHG | HEIGHT: 70 IN | BODY MASS INDEX: 37.22 KG/M2 | WEIGHT: 260 LBS | TEMPERATURE: 97.8 F

## 2021-12-02 DIAGNOSIS — E53.8 DEFICIENCY OF OTHER SPECIFIED B GROUP VITAMINS: ICD-10-CM

## 2021-12-02 PROCEDURE — 99214 OFFICE O/P EST MOD 30 MIN: CPT

## 2021-12-02 NOTE — HISTORY OF PRESENT ILLNESS
[FreeTextEntry1] : Ms. Kirby presents today for neurology evaluation.\par She is right handed.\par She was referred by Dr. Carlson.\par She reports numbness in her fingers and toes and pain in her legs.\par Numbness in her right fingers is worse than in her left.\par She reports both loss of feeling and tingling.\par She describes the pain in her feet as a burning sensation. The pain is worse in the left leg than the right. She has pain around the calf.\par She uses a cane or walker.\par She was previously at rehab for inability to walk.\par She has a history of venous insufficiency, lymphedema and recurrent ulcers.\par She has a history of type 2 diabetes for several years.\par \par She does not believe that she filled the duloxetine prescribed in August.\par She does take gabapentin 800 mg TID. When the dose was increased from 600 mg to 800 mg TID she found it to be helpful. She denies having any side effects.\par She does find some topical agents to be helpful.\par \par She was told years ago that she has bilateral carpal tunnel syndrome.

## 2021-12-02 NOTE — CONSULT LETTER
[Dear  ___] : Dear  [unfilled], [Consult Letter:] : I had the pleasure of evaluating your patient, [unfilled]. [Please see my note below.] : Please see my note below. [Consult Closing:] : Thank you very much for allowing me to participate in the care of this patient.  If you have any questions, please do not hesitate to contact me. [FreeTextEntry2] : Justo Mayer [DrCarla  ___] : Dr. LUBIN

## 2021-12-02 NOTE — DISCUSSION/SUMMARY
[FreeTextEntry1] : Ms. Kirby is a 71 year old woman with multiple medical problems including diabetes, venous stasis, lymphedema.\par She has chronic pain in her lower extremities and numbness/tingling in her fingers and toes.\par Her symptoms and examination are suggestive of neuropathy.\par We discussed a NCV/EMG for confirmation. She would like to hold off. Since it is fairly evident that she has neuropathy I think this is reasonable.\par Diabetes is the likely cause of neuropathy but I will order some additional tests to rule out other common causes.\par I advised her to avoid walking barefoot.\par At this time gabapentin 800 mg TID has been helpful and she can continue on this dose.\par We discussed duloxetine which was previously prescribed but she would prefer to avoid additional medications.\par I suggested a trial of topical medications including CBD oil. \par The paresthesias in her fingers may also be partially due to median neuropathy.  I explained that an EMG may help to differentiate but she is still not interested.\par She will ask her orthopedist about steroid injections for CTS.\par I will follow up with Ms. Kirby in 4-6 months, sooner if needed.

## 2021-12-02 NOTE — PHYSICAL EXAM
[FreeTextEntry1] : Examination:\par Constitutional: normal, no apparent distress\par Eyes: normal conjunctiva b/l, no ptosis, visual fields full\par Respiratory: no respiratory distress, normal effort, normal auscultation\par Cardiovascular: normal rate, rhythm, no murmurs\par Neck: supple, no masses\par Vascular: carotids normal\par Skin: normal color, no rashes\par Psych: normal mood, affect\par \par Neurological:\par Memory: normal memory, oriented to person, place, time\par Language intact/no aphasia\par Cranial Nerves: II-XII intact, Pupils equally round and reactive to light, ocular muscles/movements intact, no ptosis, no facial weakness, tongue protrudes normally in the midline, \par Motor: normal tone, no pronator drift, full strength in all four extremities in the proximal and distal muscle groups\par Coordination: Fine motor movements intact, rapid alternating movements intact, finger to nose intact bilaterally\par Sensory: decreased light touch and pinprick over left lower leg, decreased vibration bilaterally, left worse than right, difficulty with joint position sense on left\par DTRs: symmetric, trace in b/l triceps, trace in b/l biceps, trace in b/l brachioradialis, absent in bilateral patellars, absent in bilateral Achilles, Babinskis negative bilaterally\par Gait: slow, using cane\par

## 2021-12-17 ENCOUNTER — APPOINTMENT (OUTPATIENT)
Dept: VASCULAR SURGERY | Facility: CLINIC | Age: 71
End: 2021-12-17
Payer: MEDICARE

## 2021-12-17 VITALS — DIASTOLIC BLOOD PRESSURE: 81 MMHG | SYSTOLIC BLOOD PRESSURE: 165 MMHG | OXYGEN SATURATION: 94 % | HEART RATE: 102 BPM

## 2021-12-17 PROCEDURE — 99213 OFFICE O/P EST LOW 20 MIN: CPT

## 2021-12-17 PROCEDURE — 93970 EXTREMITY STUDY: CPT

## 2021-12-17 NOTE — ASSESSMENT
[FreeTextEntry1] : 70 y/o woman with lymphedema, chronic venous stasis hyperpigmentation and dermatitis and peripheral neuropathy.  We discussed she will need a neurologic evaluation to diagnose etiology of pain.  I will prescribe Medrol and Cymbalta 30 mg QHS and renew Gabapentin 800 mg TID for neuropathic pain. She may take Tylenol PRN pain. \par \par \par Plan:\par - Continue Coumadin, ASA 81 mg and Pravastatin QD.\par - Continue Gabapentin to 800 mg TID for neuropathic pain.\par - Start Prednisone 10 mg for inflammation/pain.\par - Start Cymbalta 30 mg QHS for neuropathic pain.\par - Continue medical management of HTN.\par -  Continue conservative therapy: compression stockings 20-30 mmHg daily from awakening until bedtime, leg   elevation above the level of the heart at rest and frequent ambulation.\par - Walk daily for exercise.\par - Refer to Neurology for evaluation of peripheral neuropathy.\par - RTC 2-3 weeks.\par \par \par \par

## 2021-12-17 NOTE — PHYSICAL EXAM
[2+] : left 2+ [Ankle Swelling (On Exam)] : present [Ankle Swelling Bilaterally] : bilaterally  [] : bilaterally [Ankle Swelling On The Left] : moderate [Alert] : alert [Oriented to Person] : oriented to person [Oriented to Place] : oriented to place [Oriented to Time] : oriented to time [Calm] : calm [de-identified] : WD, WN, NAD. Awake, alert, interactive. Ambulates slowly with a cane for support. [de-identified] : LETICIA, PERSHASHAL [de-identified] : supple [FreeTextEntry1] : edema LLE>RLE [de-identified] : no cyanosis or deformity. full ROM, MS 5/5\par  [de-identified] : WWP. Chronic venous stasis hyperpigmentation persists distally. No wounds, ulcers or weeping. Moderate lymphedema. No s/s of infection.

## 2021-12-17 NOTE — ADDENDUM
[FreeTextEntry1] : Patient has a possible component of primary lymphedema. The patient has been compliant with conservative therapies including compression 20-30mmHg, exercise and leg elevation at rest for over a month. She has also been using the basic pump (entre ) for over 2 years and complains of significant pain. She has changed her diet to low sodium meals and has attempted self MLD with no relief in symptoms.  Despite these therapies symptoms continue to progress and extend into the truncal region. Early signs of hyperpigmentation noted in both calves. I am now recommending the advanced pump Flexitouch to appropriately treat this patient's symptoms both distally and proximally. \par

## 2021-12-17 NOTE — HISTORY OF PRESENT ILLNESS
[FreeTextEntry1] : 70 y/o female returns the office for follow-up. PMH:  DM2, HTN and A Fib. venous insufficiency, lymphedema and recurrent ulcers  She reports increased pain to her feet even with use of gabapentin 3 times a day.  In the past she states prednisone has helped but she never has had drastic improvement of symptoms.  She was advised to seek neurologic evaluation for symptoms but has not as of yet.  There continues to be improvement in edema to her legs with use of conservative treatment: Compression stockings 20 to 30 mmHg daily and lymphedema pumps.  She walks daily for exercise, elevates her legs at rest and ambulates frequently.  Pain is described as burning and tingling pain to her legs all day. She has been seeing a pain management specialist, but has not had relief of pain.  She takes ASA 81 mg, Coumadin and Pravastatin 10 mg QD. No current pain reported. No fever or chills. She is a non-smoker.

## 2022-01-08 NOTE — ED PROVIDER NOTE - GASTROINTESTINAL NEGATIVE STATEMENT, MLM
Asymptomatic, vaccinated and boostered emp with exposure, did test on her own with rapid and pcr, both negative, No testing required at this time, placed on call log, Is danny to return to volunteer post.
no vomiting.

## 2022-01-21 ENCOUNTER — APPOINTMENT (OUTPATIENT)
Dept: VASCULAR SURGERY | Facility: CLINIC | Age: 72
End: 2022-01-21

## 2022-03-11 ENCOUNTER — APPOINTMENT (OUTPATIENT)
Dept: VASCULAR SURGERY | Facility: CLINIC | Age: 72
End: 2022-03-11
Payer: MEDICARE

## 2022-03-11 VITALS
DIASTOLIC BLOOD PRESSURE: 87 MMHG | SYSTOLIC BLOOD PRESSURE: 152 MMHG | BODY MASS INDEX: 37.22 KG/M2 | WEIGHT: 260 LBS | OXYGEN SATURATION: 93 % | HEIGHT: 70 IN | HEART RATE: 91 BPM

## 2022-03-11 PROCEDURE — 99213 OFFICE O/P EST LOW 20 MIN: CPT

## 2022-03-11 NOTE — HISTORY OF PRESENT ILLNESS
[FreeTextEntry1] : 8/6/21: 70 y/o female returns the office for follow-up. PMH:  DM2, HTN and A Fib. venous insufficiency, lymphedema and recurrent ulcers  She reports increased pain to her feet even with use of gabapentin 3 times a day.  In the past she states prednisone has helped but she never has had drastic improvement of symptoms.  She was advised to seek neurologic evaluation for symptoms but has not as of yet.  There continues to be improvement in edema to her legs with use of conservative treatment: Compression stockings 20 to 30 mmHg daily and lymphedema pumps.  She walks daily for exercise, elevates her legs at rest and ambulates frequently.  Pain is described as burning and tingling pain to her legs all day. She has been seeing a pain management specialist, but has not had relief of pain.  She takes ASA 81 mg, Coumadin and Pravastatin 10 mg QD. No current pain reported. No fever or chills. She is a non-smoker. \par \par 3/9/22: Pt still has leg pain since last visit. She feels the legs felt much better on prednisone. She has pain in her medial ankles bilaterally. She has been compliant with compression stockings. She does not use the pump because it feels it is too restrictive. She takes ASA 81 mg, Coumadin and Pravastatin 10 mg QD. Pt is still taking Gabapentin 800 mg. She does not take cymbalta.

## 2022-03-11 NOTE — PROCEDURE
[FreeTextEntry1] : Studies: \par 12/17/21 BLE venous duplex: \par right: GSV absent, tributary veins 3.7 mm. \par left: GSV absent, tributary veins 3.8

## 2022-03-11 NOTE — PHYSICAL EXAM
[2+] : left 2+ [Ankle Swelling (On Exam)] : present [Ankle Swelling Bilaterally] : bilaterally  [] : bilaterally [Ankle Swelling On The Left] : moderate [Alert] : alert [Oriented to Person] : oriented to person [Oriented to Place] : oriented to place [Oriented to Time] : oriented to time [Calm] : calm [de-identified] : WD, WN, NAD. Awake, alert, interactive. Ambulates slowly with a cane for support. [de-identified] : LETICIA, PERSHASHAL [de-identified] : supple [FreeTextEntry1] : edema LLE>RLE [de-identified] : no cyanosis or deformity. full ROM, MS 5/5\par  [de-identified] : WWP. Chronic venous stasis hyperpigmentation persists distally. No wounds, ulcers or weeping. Moderate lymphedema. No s/s of infection.

## 2022-03-11 NOTE — DATA REVIEWED
[FreeTextEntry1] : U/S Venous Duplex 8/14/20 demonstrates B/L deep insufficiency b/l with incompetent tribs in thighs and calves as well as incompetent perforators.

## 2022-03-11 NOTE — ASSESSMENT
[FreeTextEntry1] : 70 y/o woman with lymphedema, chronic venous stasis hyperpigmentation and dermatitis and peripheral neuropathy.  We discussed she will need a neurologic evaluation to diagnose etiology of pain.  I will prescribe prednisone 10 mg daily. \par \par \par Plan:\par - Continue Coumadin, ASA 81 mg and Pravastatin QD.\par - Continue Gabapentin to 800 mg TID for neuropathic pain.\par - Start Prednisone 10 mg for inflammation/pain.\par - Continue medical management of HTN.\par -  Continue conservative therapy: compression stockings 20-30 mmHg daily from awakening until bedtime, leg elevation above the level of the heart at rest and frequent ambulation.\par - Walk daily for exercise.\par - Refer to Neurology for evaluation of peripheral neuropathy.\par - RTC 2-3 weeks to monitor symptoms\par \par A total of 30 minutes was spent with patient and coordinating care\par \par

## 2022-03-18 ENCOUNTER — APPOINTMENT (OUTPATIENT)
Dept: NEUROLOGY | Facility: CLINIC | Age: 72
End: 2022-03-18

## 2022-03-30 NOTE — ASU PREOP CHECKLIST - WARM FLUIDS/WARM BLANKETS
[facility]  [phone]    Patient Name: Lai Mclean  YOB: 1979    Date of Provider Hospice Visit: 03/30/22    Level of Care:   [] General Inpatient (GIP)    [] Routine   [x] Respite    Current Location of Care:  [] [facility] [] [facility] [] [facility] [] [address] [x] [facility] [place] [facility]      Principle Hospice Diagnosis: Glioblastoma       HOSPICE SUMMARY     Chart Reviewed for patient's medical history and hospice care plan. Hospice Physician Certification/Recertification Narrative per Sofi Antonio / other MD:     Patient sent to the [facility] for respite LOC secondary to caregiver exhaustion. Patient on service secondary to GB. Patient is more alert today then when I saw her during the last respite stay. No acute symptoms at this time. Patient being admitted for Respite care x 5 days for   [x]  Caregiver exhaustion and needing break  []  Caregiver unavailable       PLAN   1. Patient's home medications were reviewed and reconciled. Continue with current home medications and plan of care as outlined in chart. 2. No change in medications at this time. Ongoing assessment for symptom burden    3.  and SW to support family needs. 4. Disposition: Home with hospice once respite stay is completed. no

## 2022-05-12 NOTE — ED ADULT NURSE NOTE - CHPI ED NUR CONTEXT2
Returned call to pt for more information, no answer, left msg known (describe)/cellulitis per MD ocampo

## 2022-05-18 NOTE — ED PROVIDER NOTE - TEMPLATE
Orthopedic
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

## 2022-05-26 ENCOUNTER — INPATIENT (INPATIENT)
Facility: HOSPITAL | Age: 72
LOS: 11 days | Discharge: HOME HEALTH | DRG: 193 | End: 2022-06-07
Attending: INTERNAL MEDICINE | Admitting: INTERNAL MEDICINE
Payer: MEDICARE

## 2022-05-26 VITALS — HEIGHT: 70 IN | WEIGHT: 259.93 LBS

## 2022-05-26 DIAGNOSIS — J18.9 PNEUMONIA, UNSPECIFIED ORGANISM: ICD-10-CM

## 2022-05-26 DIAGNOSIS — Z98.890 OTHER SPECIFIED POSTPROCEDURAL STATES: Chronic | ICD-10-CM

## 2022-05-26 DIAGNOSIS — K95.09 OTHER COMPLICATIONS OF GASTRIC BAND PROCEDURE: Chronic | ICD-10-CM

## 2022-05-26 LAB
ADD ON TEST-SPECIMEN IN LAB: SIGNIFICANT CHANGE UP
ALBUMIN SERPL ELPH-MCNC: 3.2 G/DL — LOW (ref 3.3–5)
ALP SERPL-CCNC: 92 U/L — SIGNIFICANT CHANGE UP (ref 40–120)
ALT FLD-CCNC: 76 U/L — SIGNIFICANT CHANGE UP (ref 12–78)
ANION GAP SERPL CALC-SCNC: 11 MMOL/L — SIGNIFICANT CHANGE UP (ref 5–17)
APTT BLD: 39.6 SEC — HIGH (ref 27.5–35.5)
AST SERPL-CCNC: 35 U/L — SIGNIFICANT CHANGE UP (ref 15–37)
BASOPHILS # BLD AUTO: 0.04 K/UL — SIGNIFICANT CHANGE UP (ref 0–0.2)
BASOPHILS NFR BLD AUTO: 0.5 % — SIGNIFICANT CHANGE UP (ref 0–2)
BILIRUB SERPL-MCNC: 0.6 MG/DL — SIGNIFICANT CHANGE UP (ref 0.2–1.2)
BUN SERPL-MCNC: 17 MG/DL — SIGNIFICANT CHANGE UP (ref 7–23)
CALCIUM SERPL-MCNC: 8.8 MG/DL — SIGNIFICANT CHANGE UP (ref 8.5–10.1)
CHLORIDE SERPL-SCNC: 106 MMOL/L — SIGNIFICANT CHANGE UP (ref 96–108)
CO2 SERPL-SCNC: 25 MMOL/L — SIGNIFICANT CHANGE UP (ref 22–31)
CREAT SERPL-MCNC: 0.64 MG/DL — SIGNIFICANT CHANGE UP (ref 0.5–1.3)
EGFR: 94 ML/MIN/1.73M2 — SIGNIFICANT CHANGE UP
EOSINOPHIL # BLD AUTO: 0.1 K/UL — SIGNIFICANT CHANGE UP (ref 0–0.5)
EOSINOPHIL NFR BLD AUTO: 1.2 % — SIGNIFICANT CHANGE UP (ref 0–6)
FLUAV AG NPH QL: SIGNIFICANT CHANGE UP
FLUBV AG NPH QL: SIGNIFICANT CHANGE UP
GLUCOSE SERPL-MCNC: 114 MG/DL — HIGH (ref 70–99)
HCT VFR BLD CALC: 43.9 % — SIGNIFICANT CHANGE UP (ref 34.5–45)
HGB BLD-MCNC: 14 G/DL — SIGNIFICANT CHANGE UP (ref 11.5–15.5)
IMM GRANULOCYTES NFR BLD AUTO: 0.7 % — SIGNIFICANT CHANGE UP (ref 0–1.5)
INR BLD: 2.36 RATIO — HIGH (ref 0.88–1.16)
LACTATE SERPL-SCNC: 1.1 MMOL/L — SIGNIFICANT CHANGE UP (ref 0.7–2)
LYMPHOCYTES # BLD AUTO: 0.52 K/UL — LOW (ref 1–3.3)
LYMPHOCYTES # BLD AUTO: 6.4 % — LOW (ref 13–44)
MAGNESIUM SERPL-MCNC: 1.6 MG/DL — SIGNIFICANT CHANGE UP (ref 1.6–2.6)
MCHC RBC-ENTMCNC: 30.9 PG — SIGNIFICANT CHANGE UP (ref 27–34)
MCHC RBC-ENTMCNC: 31.9 GM/DL — LOW (ref 32–36)
MCV RBC AUTO: 96.9 FL — SIGNIFICANT CHANGE UP (ref 80–100)
MONOCYTES # BLD AUTO: 0.83 K/UL — SIGNIFICANT CHANGE UP (ref 0–0.9)
MONOCYTES NFR BLD AUTO: 10.1 % — SIGNIFICANT CHANGE UP (ref 2–14)
NEUTROPHILS # BLD AUTO: 6.63 K/UL — SIGNIFICANT CHANGE UP (ref 1.8–7.4)
NEUTROPHILS NFR BLD AUTO: 81.1 % — HIGH (ref 43–77)
NT-PROBNP SERPL-SCNC: 1450 PG/ML — HIGH (ref 0–125)
PLATELET # BLD AUTO: 235 K/UL — SIGNIFICANT CHANGE UP (ref 150–400)
POTASSIUM SERPL-MCNC: 4 MMOL/L — SIGNIFICANT CHANGE UP (ref 3.5–5.3)
POTASSIUM SERPL-SCNC: 4 MMOL/L — SIGNIFICANT CHANGE UP (ref 3.5–5.3)
PROT SERPL-MCNC: 7.2 GM/DL — SIGNIFICANT CHANGE UP (ref 6–8.3)
PROTHROM AB SERPL-ACNC: 27.6 SEC — HIGH (ref 10.5–13.4)
RBC # BLD: 4.53 M/UL — SIGNIFICANT CHANGE UP (ref 3.8–5.2)
RBC # FLD: 13.9 % — SIGNIFICANT CHANGE UP (ref 10.3–14.5)
RSV RNA NPH QL NAA+NON-PROBE: SIGNIFICANT CHANGE UP
SARS-COV-2 RNA SPEC QL NAA+PROBE: SIGNIFICANT CHANGE UP
SODIUM SERPL-SCNC: 142 MMOL/L — SIGNIFICANT CHANGE UP (ref 135–145)
TROPONIN I, HIGH SENSITIVITY RESULT: 11.14 NG/L — SIGNIFICANT CHANGE UP
WBC # BLD: 8.18 K/UL — SIGNIFICANT CHANGE UP (ref 3.8–10.5)
WBC # FLD AUTO: 8.18 K/UL — SIGNIFICANT CHANGE UP (ref 3.8–10.5)

## 2022-05-26 PROCEDURE — 85027 COMPLETE CBC AUTOMATED: CPT

## 2022-05-26 PROCEDURE — 97116 GAIT TRAINING THERAPY: CPT | Mod: GP

## 2022-05-26 PROCEDURE — 85610 PROTHROMBIN TIME: CPT

## 2022-05-26 PROCEDURE — 71250 CT THORAX DX C-: CPT | Mod: 26,MA

## 2022-05-26 PROCEDURE — 82962 GLUCOSE BLOOD TEST: CPT

## 2022-05-26 PROCEDURE — 80048 BASIC METABOLIC PNL TOTAL CA: CPT

## 2022-05-26 PROCEDURE — 99285 EMERGENCY DEPT VISIT HI MDM: CPT | Mod: FS,CS

## 2022-05-26 PROCEDURE — 85730 THROMBOPLASTIN TIME PARTIAL: CPT

## 2022-05-26 PROCEDURE — U0003: CPT

## 2022-05-26 PROCEDURE — 84145 PROCALCITONIN (PCT): CPT

## 2022-05-26 PROCEDURE — U0005: CPT

## 2022-05-26 PROCEDURE — 97163 PT EVAL HIGH COMPLEX 45 MIN: CPT | Mod: GP

## 2022-05-26 PROCEDURE — 71045 X-RAY EXAM CHEST 1 VIEW: CPT | Mod: 26

## 2022-05-26 PROCEDURE — 94640 AIRWAY INHALATION TREATMENT: CPT

## 2022-05-26 PROCEDURE — 36415 COLL VENOUS BLD VENIPUNCTURE: CPT

## 2022-05-26 PROCEDURE — 71045 X-RAY EXAM CHEST 1 VIEW: CPT

## 2022-05-26 PROCEDURE — 83036 HEMOGLOBIN GLYCOSYLATED A1C: CPT

## 2022-05-26 PROCEDURE — 87449 NOS EACH ORGANISM AG IA: CPT

## 2022-05-26 RX ORDER — SODIUM CHLORIDE 9 MG/ML
1000 INJECTION INTRAMUSCULAR; INTRAVENOUS; SUBCUTANEOUS ONCE
Refills: 0 | Status: COMPLETED | OUTPATIENT
Start: 2022-05-26 | End: 2022-05-26

## 2022-05-26 RX ORDER — FUROSEMIDE 40 MG
1 TABLET ORAL
Qty: 0 | Refills: 0 | DISCHARGE

## 2022-05-26 RX ORDER — HYDROMORPHONE HYDROCHLORIDE 2 MG/ML
1 INJECTION INTRAMUSCULAR; INTRAVENOUS; SUBCUTANEOUS
Qty: 0 | Refills: 0 | DISCHARGE

## 2022-05-26 RX ORDER — ACETAMINOPHEN 500 MG
1000 TABLET ORAL ONCE
Refills: 0 | Status: COMPLETED | OUTPATIENT
Start: 2022-05-26 | End: 2022-05-26

## 2022-05-26 RX ORDER — METOPROLOL TARTRATE 50 MG
5 TABLET ORAL ONCE
Refills: 0 | Status: DISCONTINUED | OUTPATIENT
Start: 2022-05-26 | End: 2022-05-26

## 2022-05-26 RX ORDER — POTASSIUM CHLORIDE 20 MEQ
1 PACKET (EA) ORAL
Qty: 0 | Refills: 0 | DISCHARGE

## 2022-05-26 RX ORDER — ALBUTEROL 90 UG/1
2 AEROSOL, METERED ORAL ONCE
Refills: 0 | Status: COMPLETED | OUTPATIENT
Start: 2022-05-26 | End: 2022-05-26

## 2022-05-26 RX ORDER — IPRATROPIUM/ALBUTEROL SULFATE 18-103MCG
3 AEROSOL WITH ADAPTER (GRAM) INHALATION ONCE
Refills: 0 | Status: COMPLETED | OUTPATIENT
Start: 2022-05-26 | End: 2022-05-26

## 2022-05-26 RX ADMIN — Medication 3 MILLILITER(S): at 14:54

## 2022-05-26 RX ADMIN — SODIUM CHLORIDE 1000 MILLILITER(S): 9 INJECTION INTRAMUSCULAR; INTRAVENOUS; SUBCUTANEOUS at 17:43

## 2022-05-26 RX ADMIN — Medication 125 MILLIGRAM(S): at 12:34

## 2022-05-26 RX ADMIN — Medication 1000 MILLIGRAM(S): at 14:53

## 2022-05-26 RX ADMIN — ALBUTEROL 2 PUFF(S): 90 AEROSOL, METERED ORAL at 12:35

## 2022-05-26 NOTE — ED STATDOCS - NS ED ATTENDING STATEMENT MOD
This was a shared visit with the BERNARD. I reviewed and verified the documentation and independently performed the documented:

## 2022-05-26 NOTE — ED STATDOCS - NS ED MD TWO NIGHTS YN
Spoke with Konrad from [employer] and her  Ginger Garces will be calling Bob this week to get the initial pump set up appointment scheduled. Ginger will be letting Bob know that this is only the pump set up and not the CGM portion as Bob will not be able to get the [employer] sensors until Nov/Dec.  She will make another appt for the CGM portion when he can get the sensors.    Yes

## 2022-05-26 NOTE — ED STATDOCS - ATTENDING APP SHARED VISIT CONTRIBUTION OF CARE
I,Meliton Shelton MD,  performed the initial face to face bedside interview with this patient regarding history of present illness, review of symptoms and relevant past medical, social and family history.  I completed an independent physical examination.  I was the initial provider who evaluated this patient. I have signed out the follow up of any pending tests (i.e. labs, radiological studies) to the ACP.  I have communicated the patient’s plan of care and disposition with the ACP.  The history, relevant review of systems, past medical and surgical history, medical decision making, and physical examination was documented by the scribe in my presence and I attest to the accuracy of the documentation.

## 2022-05-26 NOTE — ED STATDOCS - OBJECTIVE STATEMENT
70 y/o female with a PMHx of Afib, cellulitis, CHF, DM2, dyspnea, HTN, morbid obesity, neuropathy, peripheral venous insufficiency, thyroid nodule presents to the ED c/o sore throat, nasal congestion, cough and SOB x4 days. Denies CP. Pt took home COVID test, resulting negative. Nonsmoker. Vaccinated for COVID. No other complaints at this time.

## 2022-05-26 NOTE — ED ADULT NURSE NOTE - OBJECTIVE STATEMENT
Pt presents to ER c/o chest congestion and difficulty breathing. Pt reports difficulty breathing is intermittent and she cant catch full breath. Onset of symptoms began yesterday. Recent negative home covid test. Denies CP. AO x 3

## 2022-05-26 NOTE — ED ADULT TRIAGE NOTE - CHIEF COMPLAINT QUOTE
Pt comes to the ED complaining of congestion and difficulty breathing since Sunday. Pt states that she took a covid test at home yesterday that was negative. Pt able to speak in complete sentences at triage. Pt states that yesterday she started taking mucinex.

## 2022-05-26 NOTE — ED STATDOCS - PROGRESS NOTE DETAILS
signed Noreen Sheets PA-C Pt seen initially in intake by Dr Shelton.   71F c/o chest congestion, mucous, new SOB/JETT x 2 days. pt has low grade fever today. Pt O2 sat 90% on room air, pt with conversational dyspnea 4-5 word sentences, diffuse wheezing, no improvement after IV steroids or inhaler. Will give duoneb. Pt with hx of CHF, notes her legs are not swollen (for her)  but are at baseline. cxr with increased lung markings and fever. Will treat for possible PNA vx CHF exacerbation. abx ordered, IV fluids withheld due to concern for CHF. Pt febrile and slightly tachy but not hypotensive. Not on O2 at baseline. Plan abx and admission, CT chest. Pt agrees with admission and plan of care. PMD/card Klepper. concern for possible acute fluid overload due to pedal edema, as patient not hypotensive, not given 30cc/kg bolus  -md ashley patient reevaluated' off oxygen, patient desaturates,   patient able to speak in complete sentences, still tachycardic  tx for cap, admit  -md ashley signed Noreen Sheets PA-C   CT with pneumonia, +hmpv on RVP. Pt tachy, will give 1 liter NSS and reassess. Case discussed with and admission accepted by hospitalist Dr. Galindo, pt to stay in ED until HR re-evaluated. signed Noreen Sheets PA-C   rectal temp 99.2. pt still tachy 120s. Will give a second liter and reassess HR. signed Noreen Sehets PA-C   s/p second liter . Pt tolerated a meal but still dyspneic with movement. No hypotension. Further management as per inpt team.

## 2022-05-26 NOTE — ED ADULT NURSE NOTE - NSIMPLEMENTINTERV_GEN_ALL_ED
Implemented All Universal Safety Interventions:  [place] to call system. Call bell, personal items and telephone within reach. Instruct patient to call for assistance. Room bathroom lighting operational. Non-slip footwear when patient is off stretcher. Physically safe environment: no spills, clutter or unnecessary equipment. Stretcher in lowest position, wheels locked, appropriate side rails in place.

## 2022-05-26 NOTE — PHARMACOTHERAPY INTERVENTION NOTE - COMMENTS
Medication reconciliation completed.  Reviewed Medication list and confirmed med allergies with patient; confirmed with Dr. First Medgiorgio.

## 2022-05-26 NOTE — ED STATDOCS - CARE PLAN
Principal Discharge DX:	Pneumonia  Secondary Diagnosis:	JETT (dyspnea on exertion)  Secondary Diagnosis:	Fever   1

## 2022-05-26 NOTE — ED STATDOCS - DATE/TIME 6
[facility]  General Surgery  Progress Note    Subjective:     Interval History:   Feeling well  Denies NV  claudia soft diet    Post-Op Info:  Procedure(s) (LRB):  ROBOTIC REPAIR, HERNIA, VENTRAL (N/A)   4 Days Post-Op      Medications:  Continuous Infusions:  Scheduled Meds:   amLODIPine  5 mg Oral Daily    atorvastatin  40 mg Oral Daily    hydroCHLOROthiazide  25 mg Oral Daily     PRN Meds:acetaminophen, butalbital-acetaminophen-caffeine -40 mg, dextrose 50 % in water (D50W), glucagon (human recombinant), hydrALAZINE, insulin aspart U-100, morphine, ondansetron, sodium chloride 0.9%     Objective:     Vital Signs (Most Recent):  Temp: 98.6 °F (37 °C) (05/04/20 0819)  Pulse: 83 (05/04/20 0819)  Resp: 20 (05/04/20 0819)  BP: (!) 173/92 (05/04/20 0819)  SpO2: 98 % (05/04/20 0832) Vital Signs (24h Range):  Temp:  [98 °F (36.7 °C)-99.3 °F (37.4 °C)] 98.6 °F (37 °C)  Pulse:  [76-98] 83  Resp:  [18-20] 20  SpO2:  [90 %-98 %] 98 %  BP: (144-173)/(70-93) 173/92       Intake/Output Summary (Last 24 hours) at 5/4/2020 0930  Last data filed at 5/4/2020 0501  Gross per 24 hour   Intake 490 ml   Output 850 ml   Net -360 ml       Physical Exam   Pulmonary/Chest: Effort normal.   Abdominal: Soft.   Neurological: She is alert.   Skin: Skin is warm.       Significant Labs:  CBC: No results for input(s): WBC, RBC, HGB, HCT, PLT, MCV, MCH, MCHC in the last 48 hours.  CMP: No results for input(s): GLU, CALCIUM, ALBUMIN, PROT, NA, K, CO2, CL, BUN, CREATININE, ALKPHOS, ALT, AST, BILITOT in the last 48 hours.    Significant Diagnostics:  I have reviewed all pertinent imaging results/findings within the past 24 hours.    Assessment/Plan:     Active Diagnoses:    Diagnosis Date Noted POA    PRINCIPAL PROBLEM:  Ventral hernia with obstruction and without gangrene [K43.6]  Yes    Partial bowel obstruction [K56.600] 04/30/2020 Yes    Type 2 diabetes mellitus, with long-term current use of insulin [E11.9, Z79.4]  04/30/2020 Not Applicable    Hyperlipidemia [E78.5] 08/18/2016 Yes    Morbid obesity with BMI of 50.0-59.9, adult [E66.01, Z68.43] 03/16/2016 Not Applicable    Essential hypertension [I10] 03/16/2016 Yes      Problems Resolved During this Admission:     53F with ventral hernia, resolving obstruction  Follow up with cardiologist at [facility]  Follow up with me in 2 weeks      Brady Barboza MD  General Surgery  [facility]   26-May-2022 19:27

## 2022-05-26 NOTE — ED STATDOCS - CLINICAL SUMMARY MEDICAL DECISION MAKING FREE TEXT BOX
70 y/o female with congestion, cough and SOB. Will workup for flu vs COVID vs PNA. Will obtain labs, chest XR, Albuterol, reeval.

## 2022-05-27 DIAGNOSIS — J12.3 HUMAN METAPNEUMOVIRUS PNEUMONIA: ICD-10-CM

## 2022-05-27 DIAGNOSIS — R05.3 CHRONIC COUGH: ICD-10-CM

## 2022-05-27 DIAGNOSIS — B34.8 OTHER VIRAL INFECTIONS OF UNSPECIFIED SITE: ICD-10-CM

## 2022-05-27 DIAGNOSIS — J98.01 ACUTE BRONCHOSPASM: ICD-10-CM

## 2022-05-27 DIAGNOSIS — J40 BRONCHITIS, NOT SPECIFIED AS ACUTE OR CHRONIC: ICD-10-CM

## 2022-05-27 LAB
A1C WITH ESTIMATED AVERAGE GLUCOSE RESULT: 5.8 % — HIGH (ref 4–5.6)
ESTIMATED AVERAGE GLUCOSE: 120 MG/DL — HIGH (ref 68–114)
INR BLD: 1.7 RATIO — HIGH (ref 0.88–1.16)
PROCALCITONIN SERPL-MCNC: 0.03 NG/ML — SIGNIFICANT CHANGE UP (ref 0.02–0.1)
PROTHROM AB SERPL-ACNC: 19.8 SEC — HIGH (ref 10.5–13.4)

## 2022-05-27 PROCEDURE — 99223 1ST HOSP IP/OBS HIGH 75: CPT

## 2022-05-27 PROCEDURE — 12345: CPT | Mod: NC

## 2022-05-27 RX ORDER — ZOLPIDEM TARTRATE 10 MG/1
5 TABLET ORAL AT BEDTIME
Refills: 0 | Status: DISCONTINUED | OUTPATIENT
Start: 2022-05-27 | End: 2022-06-03

## 2022-05-27 RX ORDER — SODIUM CHLORIDE 9 MG/ML
1000 INJECTION, SOLUTION INTRAVENOUS
Refills: 0 | Status: DISCONTINUED | OUTPATIENT
Start: 2022-05-27 | End: 2022-06-07

## 2022-05-27 RX ORDER — ACETAMINOPHEN 500 MG
650 TABLET ORAL EVERY 6 HOURS
Refills: 0 | Status: DISCONTINUED | OUTPATIENT
Start: 2022-05-27 | End: 2022-06-07

## 2022-05-27 RX ORDER — MONTELUKAST 4 MG/1
10 TABLET, CHEWABLE ORAL AT BEDTIME
Refills: 0 | Status: DISCONTINUED | OUTPATIENT
Start: 2022-05-27 | End: 2022-06-07

## 2022-05-27 RX ORDER — GLUCAGON INJECTION, SOLUTION 0.5 MG/.1ML
1 INJECTION, SOLUTION SUBCUTANEOUS ONCE
Refills: 0 | Status: DISCONTINUED | OUTPATIENT
Start: 2022-05-27 | End: 2022-06-07

## 2022-05-27 RX ORDER — CEFEPIME 1 G/1
2000 INJECTION, POWDER, FOR SOLUTION INTRAMUSCULAR; INTRAVENOUS EVERY 12 HOURS
Refills: 0 | Status: DISCONTINUED | OUTPATIENT
Start: 2022-05-27 | End: 2022-06-02

## 2022-05-27 RX ORDER — DEXTROSE 50 % IN WATER 50 %
15 SYRINGE (ML) INTRAVENOUS ONCE
Refills: 0 | Status: DISCONTINUED | OUTPATIENT
Start: 2022-05-27 | End: 2022-06-07

## 2022-05-27 RX ORDER — INSULIN LISPRO 100/ML
VIAL (ML) SUBCUTANEOUS AT BEDTIME
Refills: 0 | Status: DISCONTINUED | OUTPATIENT
Start: 2022-05-27 | End: 2022-06-07

## 2022-05-27 RX ORDER — DEXTROSE 50 % IN WATER 50 %
25 SYRINGE (ML) INTRAVENOUS ONCE
Refills: 0 | Status: DISCONTINUED | OUTPATIENT
Start: 2022-05-27 | End: 2022-06-07

## 2022-05-27 RX ORDER — METOPROLOL TARTRATE 50 MG
100 TABLET ORAL DAILY
Refills: 0 | Status: DISCONTINUED | OUTPATIENT
Start: 2022-05-27 | End: 2022-06-07

## 2022-05-27 RX ORDER — FUROSEMIDE 40 MG
80 TABLET ORAL
Refills: 0 | Status: DISCONTINUED | OUTPATIENT
Start: 2022-05-27 | End: 2022-05-30

## 2022-05-27 RX ORDER — IPRATROPIUM/ALBUTEROL SULFATE 18-103MCG
3 AEROSOL WITH ADAPTER (GRAM) INHALATION EVERY 6 HOURS
Refills: 0 | Status: DISCONTINUED | OUTPATIENT
Start: 2022-05-27 | End: 2022-06-07

## 2022-05-27 RX ORDER — AZITHROMYCIN 500 MG/1
TABLET, FILM COATED ORAL
Refills: 0 | Status: DISCONTINUED | OUTPATIENT
Start: 2022-05-27 | End: 2022-06-01

## 2022-05-27 RX ORDER — CHOLESTYRAMINE 4 G/9G
4 POWDER, FOR SUSPENSION ORAL DAILY
Refills: 0 | Status: DISCONTINUED | OUTPATIENT
Start: 2022-05-27 | End: 2022-06-07

## 2022-05-27 RX ORDER — CHOLECALCIFEROL (VITAMIN D3) 125 MCG
1000 CAPSULE ORAL DAILY
Refills: 0 | Status: DISCONTINUED | OUTPATIENT
Start: 2022-05-27 | End: 2022-06-07

## 2022-05-27 RX ORDER — WARFARIN SODIUM 2.5 MG/1
5 TABLET ORAL DAILY
Refills: 0 | Status: COMPLETED | OUTPATIENT
Start: 2022-05-27 | End: 2022-05-29

## 2022-05-27 RX ORDER — ACETAMINOPHEN 500 MG
1000 TABLET ORAL ONCE
Refills: 0 | Status: COMPLETED | OUTPATIENT
Start: 2022-05-27 | End: 2022-05-27

## 2022-05-27 RX ORDER — ALBUTEROL 90 UG/1
2 AEROSOL, METERED ORAL EVERY 4 HOURS
Refills: 0 | Status: DISCONTINUED | OUTPATIENT
Start: 2022-05-27 | End: 2022-06-07

## 2022-05-27 RX ORDER — EZETIMIBE 10 MG/1
10 TABLET ORAL DAILY
Refills: 0 | Status: DISCONTINUED | OUTPATIENT
Start: 2022-05-27 | End: 2022-06-07

## 2022-05-27 RX ORDER — VERAPAMIL HCL 240 MG
120 CAPSULE, EXTENDED RELEASE PELLETS 24 HR ORAL
Refills: 0 | Status: DISCONTINUED | OUTPATIENT
Start: 2022-05-27 | End: 2022-06-07

## 2022-05-27 RX ORDER — ALBUTEROL 90 UG/1
2 AEROSOL, METERED ORAL EVERY 6 HOURS
Refills: 0 | Status: DISCONTINUED | OUTPATIENT
Start: 2022-05-27 | End: 2022-06-07

## 2022-05-27 RX ORDER — INSULIN LISPRO 100/ML
VIAL (ML) SUBCUTANEOUS
Refills: 0 | Status: DISCONTINUED | OUTPATIENT
Start: 2022-05-27 | End: 2022-06-07

## 2022-05-27 RX ORDER — GABAPENTIN 400 MG/1
800 CAPSULE ORAL THREE TIMES A DAY
Refills: 0 | Status: DISCONTINUED | OUTPATIENT
Start: 2022-05-27 | End: 2022-06-07

## 2022-05-27 RX ORDER — AZITHROMYCIN 500 MG/1
500 TABLET, FILM COATED ORAL ONCE
Refills: 0 | Status: COMPLETED | OUTPATIENT
Start: 2022-05-27 | End: 2022-05-27

## 2022-05-27 RX ORDER — DEXTROSE 50 % IN WATER 50 %
12.5 SYRINGE (ML) INTRAVENOUS ONCE
Refills: 0 | Status: DISCONTINUED | OUTPATIENT
Start: 2022-05-27 | End: 2022-06-07

## 2022-05-27 RX ORDER — AZITHROMYCIN 500 MG/1
500 TABLET, FILM COATED ORAL EVERY 24 HOURS
Refills: 0 | Status: DISCONTINUED | OUTPATIENT
Start: 2022-05-28 | End: 2022-06-01

## 2022-05-27 RX ORDER — BUDESONIDE, MICRONIZED 100 %
0.5 POWDER (GRAM) MISCELLANEOUS EVERY 12 HOURS
Refills: 0 | Status: DISCONTINUED | OUTPATIENT
Start: 2022-05-27 | End: 2022-06-07

## 2022-05-27 RX ADMIN — Medication 650 MILLIGRAM(S): at 17:32

## 2022-05-27 RX ADMIN — Medication 600 MILLIGRAM(S): at 21:10

## 2022-05-27 RX ADMIN — Medication 1: at 17:32

## 2022-05-27 RX ADMIN — CEFEPIME 100 MILLIGRAM(S): 1 INJECTION, POWDER, FOR SOLUTION INTRAMUSCULAR; INTRAVENOUS at 12:39

## 2022-05-27 RX ADMIN — Medication 650 MILLIGRAM(S): at 18:00

## 2022-05-27 RX ADMIN — GABAPENTIN 800 MILLIGRAM(S): 400 CAPSULE ORAL at 14:57

## 2022-05-27 RX ADMIN — Medication 400 MILLIGRAM(S): at 23:07

## 2022-05-27 RX ADMIN — MONTELUKAST 10 MILLIGRAM(S): 4 TABLET, CHEWABLE ORAL at 21:10

## 2022-05-27 RX ADMIN — Medication 40 MILLIGRAM(S): at 10:09

## 2022-05-27 RX ADMIN — Medication 80 MILLIGRAM(S): at 22:59

## 2022-05-27 RX ADMIN — GABAPENTIN 800 MILLIGRAM(S): 400 CAPSULE ORAL at 10:08

## 2022-05-27 RX ADMIN — GABAPENTIN 800 MILLIGRAM(S): 400 CAPSULE ORAL at 21:09

## 2022-05-27 RX ADMIN — Medication 3 MILLILITER(S): at 10:10

## 2022-05-27 RX ADMIN — ZOLPIDEM TARTRATE 5 MILLIGRAM(S): 10 TABLET ORAL at 22:59

## 2022-05-27 RX ADMIN — GABAPENTIN 800 MILLIGRAM(S): 400 CAPSULE ORAL at 01:32

## 2022-05-27 RX ADMIN — WARFARIN SODIUM 5 MILLIGRAM(S): 2.5 TABLET ORAL at 21:10

## 2022-05-27 RX ADMIN — Medication 1000 MILLIGRAM(S): at 23:37

## 2022-05-27 RX ADMIN — AZITHROMYCIN 500 MILLIGRAM(S): 500 TABLET, FILM COATED ORAL at 14:57

## 2022-05-27 RX ADMIN — Medication 40 MILLIGRAM(S): at 21:08

## 2022-05-27 RX ADMIN — Medication 3 MILLILITER(S): at 13:45

## 2022-05-27 RX ADMIN — Medication 120 MILLIGRAM(S): at 10:08

## 2022-05-27 RX ADMIN — CEFEPIME 100 MILLIGRAM(S): 1 INJECTION, POWDER, FOR SOLUTION INTRAMUSCULAR; INTRAVENOUS at 21:29

## 2022-05-27 RX ADMIN — ZOLPIDEM TARTRATE 5 MILLIGRAM(S): 10 TABLET ORAL at 01:33

## 2022-05-27 RX ADMIN — Medication 120 MILLIGRAM(S): at 21:09

## 2022-05-27 RX ADMIN — Medication 0.5 MILLIGRAM(S): at 20:51

## 2022-05-27 RX ADMIN — Medication 120 MILLIGRAM(S): at 01:33

## 2022-05-27 RX ADMIN — Medication 600 MILLIGRAM(S): at 10:09

## 2022-05-27 RX ADMIN — EZETIMIBE 10 MILLIGRAM(S): 10 TABLET ORAL at 21:09

## 2022-05-27 RX ADMIN — Medication 10 MILLIGRAM(S): at 21:10

## 2022-05-27 RX ADMIN — MONTELUKAST 10 MILLIGRAM(S): 4 TABLET, CHEWABLE ORAL at 01:33

## 2022-05-27 RX ADMIN — CHOLESTYRAMINE 4 GRAM(S): 4 POWDER, FOR SUSPENSION ORAL at 17:33

## 2022-05-27 RX ADMIN — Medication 100 MILLIGRAM(S): at 10:09

## 2022-05-27 RX ADMIN — Medication 3 MILLILITER(S): at 20:51

## 2022-05-27 RX ADMIN — Medication 1000 UNIT(S): at 10:09

## 2022-05-27 NOTE — PATIENT PROFILE ADULT - FALL HARM RISK - HARM RISK INTERVENTIONS

## 2022-05-27 NOTE — H&P ADULT - NSHPPHYSICALEXAM_GEN_ALL_CORE
Vital Signs Last 24 Hrs  T(C): 37.1 (26 May 2022 17:05), Max: 37.9 (26 May 2022 11:56)  T(F): 98.8 (26 May 2022 17:05), Max: 100.3 (26 May 2022 11:56)  HR: 122 (26 May 2022 18:10) (113 - 133)  BP: 111/74 (26 May 2022 18:10) (111/74 - 143/66)  BP(mean): 74 (26 May 2022 16:19) (74 - 93)  RR: 29 (26 May 2022 18:10) (18 - 29)  SpO2: 96% (26 May 2022 18:10) (88% - 98%)

## 2022-05-27 NOTE — H&P ADULT - ASSESSMENT
70 y/o F PMHx significant for Atrial fibrillation on Coumadin, CHF (HFpEF), Diabetes Mellitus Type 2, peripheral neuropathy, peripheral venous insufficiency, presents to the [facility] for further evaluation and management of c/o symptoms of sore throat, nasal congestion, cough and worsening shortness of breath over the past 4 days. Of note the patient took a home COVID-19 test, resulting negative. The patient admits to a productive cough with chest congestion. and a low grade fever today at home. In triage the patient was found to be in acute hypoxic respiratory distress => SpO2 90% on room air, conversational dyspnea with 4-5 word sentences, notable diffuse wheezing and no improvement via administration of IV steroids, or MDI inhaler. Of note the patient did attend a local family event recently.  Labs => INR 2.36, proBNP 1450, RVP (+) hMPV. CXR => Slightly prominent bronchovascular markings in the right middle versus lower lobe, without definite evidence of lobar consolidation and may be accentuated by summation density arising from the adjacent rib cage and overlying right breast shadow. If there are abnormal breath sounds on auscultation in this region, evolving pneumonia would then be considered. The remaining lungs are clear. Consider short-term follow-up including full inspiratory PA and lateral view if possible.  CT Chest => New findings in the right lower lobe and left upper lobe since June 2019, favored to be infection. 3 month follow-up is recommended.   In the ED the patient was given Methylprednisolone 125mg IVP x 1, Acetaminophen 1000mg PO x 1, Combivent nebs x 1, Albuterol MDI 2puffs x 1, and NS x 1L. 72 y/o F PMHx significant for Atrial fibrillation on Coumadin, CHF (HFpEF), Diabetes Mellitus Type 2, peripheral neuropathy, peripheral venous insufficiency, presents to the [facility] for further evaluation and management of c/o symptoms of sore throat, nasal congestion, cough and worsening shortness of breath over the past 4 days. Of note the patient took a home COVID-19 test, resulting negative. The patient admits to a productive cough with chest congestion. and a low grade fever today at home. In triage the patient was found to be in acute hypoxic respiratory distress => SpO2 90% on room air, conversational dyspnea with 4-5 word sentences, notable diffuse wheezing and no improvement via administration of IV steroids, or MDI inhaler. Of note the patient did attend a local family event recently.  Labs => INR 2.36, proBNP 1450, RVP (+) hMPV. CXR => Slightly prominent bronchovascular markings in the right middle versus lower lobe, without definite evidence of lobar consolidation and may be accentuated by summation density arising from the adjacent rib cage and overlying right breast shadow. If there are abnormal breath sounds on auscultation in this region, evolving pneumonia would then be considered. The remaining lungs are clear. Consider short-term follow-up including full inspiratory PA and lateral view if possible.  CT Chest => New findings in the right lower lobe and left upper lobe since June 2019, favored to be infection. 3 month follow-up is recommended.   In the ED the patient was given Methylprednisolone 125mg IVP x 1, Acetaminophen 1000mg PO x 1, Combivent nebs x 1, Albuterol MDI 2puffs x 1, and NS x 1L.    #Acute Hypoxic Respiratory Failure due to human-Metapneumovirus  #Multi-Lobar Pneumonia  ~admit to Medicine  ~f/u PAN C+S  ~cont. IV abx  ~cont. anti-pyretics prn  ~cont. anti-tussives prn  ~patient given IV hydration in the ED => NS x 1L  ~f/u w/ Pulmonology in the am    #Atrial Fibrillation  ~cont. VKA therapy per protocol  ~cont. Metoprolol ER 100mg po daily  ~cont. Verapamil 120mg po bid    #CHF (HFpEF)  ~takes Furosemide 80mg po bid prn  ~daily weights  ~strict I/Os    #Peripheral Neuropathy  ~cont. Gabapentin 800mg po tid  ~cont. Prednisone 10mg po daily    #Hyperlipidemia  ~cont. Pravastatin 10mg po qhs  ~cont. Ezetimibe 10mg po qhs    #Diabetes mellitus Type 2    #Vte ppx  ~cont. VKA therapy as above

## 2022-05-27 NOTE — PROGRESS NOTE ADULT - ASSESSMENT
#Acute Hypoxic Respiratory Failure due to hMPV with right sided infiltrate   - initiate IV abx + steroids, BDs and O2 as discussed with Dr. Tyler and Dr. Martinez    # Afib chronic on VKA with subtherapeutic INR - no bridging    # HFpEF - compensated    # DMII with DPNP - HISS, monitor on steroids, cont gabapentin    # HLD - statin    Discussed as above  All chart reviewed.  Time spent 56 min

## 2022-05-27 NOTE — PATIENT PROFILE ADULT - FUNCTIONAL ASSESSMENT - BASIC MOBILITY ASSESSMENT TYPE
[employer] message routed to provider for review and response. Please advise.      Nayeli Quiñonez CMA  9:19 AM  October 19, 2018         Admission

## 2022-05-27 NOTE — CONSULT NOTE ADULT - SUBJECTIVE AND OBJECTIVE BOX
HPI:  70 y/o F PMHx significant for Atrial fibrillation on Coumadin, CHF (HFpEF), Diabetes Mellitus Type 2, peripheral neuropathy, peripheral venous insufficiency, presents to the [facility] for further evaluation and management of c/o symptoms of sore throat, nasal congestion, cough and worsening shortness of breath over the past 4 days. Of note the patient took a home COVID-19 test, resulting negative. The patient admits to a productive cough with chest congestion. and a low grade fever today at home. In triage the patient was found to be in acute hypoxic respiratory distress => SpO2 90% on room air, conversational dyspnea with 4-5 word sentences, notable diffuse wheezing and no improvement via administration of IV steroids, or MDI inhaler. Of note the patient did attend a local family event recently.  Labs => INR 2.36, proBNP 1450, RVP (+) hMPV. CXR => Slightly prominent bronchovascular markings in the right middle versus lower lobe, without definite evidence of lobar consolidation and may be accentuated by summation density arising from the adjacent rib cage and overlying right breast shadow. If there are abnormal breath sounds on auscultation in this region, evolving pneumonia would then be considered. The remaining lungs are clear. Consider short-term follow-up including full inspiratory PA and lateral view if possible.  CT Chest => New findings in the right lower lobe and left upper lobe since June 2019, favored to be infection. 3 month follow-up is recommended.   In the ED the patient was given Methylprednisolone 125mg IVP x 1, Acetaminophen 1000mg PO x 1, Combivent nebs x 1, Albuterol MDI 2puffs x 1, and NS x 1L.   (27 May 2022 00:49)    5/27: History as above.      PAST MEDICAL & SURGICAL HISTORY:  Diabetes mellitus type II, controlled      Atrial fibrillation      Congestive heart failure      Dyspnea      Morbid obesity with BMI of 40.0-44.9, adult      Neuropathy      Peripheral venous insufficiency      Thyroid nodule      HTN (hypertension)      Cellulitis      History of esophagogastroduodenoscopy (EGD)      H/O colonoscopy      Other complications of gastric band procedure      S/P left knee arthroscopy      Status post medial meniscus repair          MEDICATIONS  (STANDING):  cholecalciferol 1000 Unit(s) Oral daily  cholestyramine Powder (Sugar-Free) 4 Gram(s) Oral daily  dextrose 5%. 1000 milliLiter(s) (100 mL/Hr) IV Continuous <Continuous>  dextrose 5%. 1000 milliLiter(s) (50 mL/Hr) IV Continuous <Continuous>  dextrose 50% Injectable 25 Gram(s) IV Push once  dextrose 50% Injectable 12.5 Gram(s) IV Push once  dextrose 50% Injectable 25 Gram(s) IV Push once  ezetimibe 10 milliGRAM(s) Oral daily  gabapentin 800 milliGRAM(s) Oral three times a day  glucagon  Injectable 1 milliGRAM(s) IntraMuscular once  guaiFENesin  milliGRAM(s) Oral every 12 hours  insulin lispro (ADMELOG) corrective regimen sliding scale   SubCutaneous three times a day before meals  insulin lispro (ADMELOG) corrective regimen sliding scale   SubCutaneous at bedtime  metoprolol succinate  milliGRAM(s) Oral daily  montelukast 10 milliGRAM(s) Oral at bedtime  pravastatin 10 milliGRAM(s) Oral at bedtime  predniSONE   Tablet 10 milliGRAM(s) Oral daily  verapamil 120 milliGRAM(s) Oral two times a day  warfarin 5 milliGRAM(s) Oral daily    MEDICATIONS  (PRN):  ALBUTerol    90 MICROgram(s) HFA Inhaler 2 Puff(s) Inhalation every 6 hours PRN Bronchospasm  cholestyramine Powder (Sugar-Free) 4 Gram(s) Oral daily PRN GI upset  dextrose Oral Gel 15 Gram(s) Oral once PRN Blood Glucose LESS THAN 70 milliGRAM(s)/deciliter  furosemide    Tablet 80 milliGRAM(s) Oral two times a day PRN CHF  zolpidem 5 milliGRAM(s) Oral at bedtime PRN Insomnia  zolpidem 5 milliGRAM(s) Oral at bedtime PRN Insomnia      Allergies    penicillin (Hives)    Intolerances        SOCIAL HISTORY: Denies tobacco, etoh abuse or illicit drug use    FAMILY HISTORY:  Family history of breast cancer in mother    Family history of diabetes mellitus in mother        Vital Signs Last 24 Hrs  T(C): 37.2 (27 May 2022 04:43), Max: 37.9 (26 May 2022 11:56)  T(F): 98.9 (27 May 2022 04:43), Max: 100.3 (26 May 2022 11:56)  HR: 85 (27 May 2022 08:00) (70 - 133)  BP: 123/112 (27 May 2022 08:00) (111/74 - 161/87)  BP(mean): 117 (27 May 2022 08:00) (74 - 117)  RR: 20 (27 May 2022 08:00) (17 - 29)  SpO2: 99% (27 May 2022 08:00) (88% - 99%)    REVIEW OF SYSTEMS:    CONSTITUTIONAL:  As per HPI.  SKIN: no rashes  HEENT:  Eyes:  No diplopia or blurred vision. ENT:  No earache, sore throat or runny nose.  CARDIOVASCULAR:  No pressure, squeezing, tightness, heaviness or aching about the chest, neck, axilla or epigastrium.  RESPIRATORY:  cough/sob  GASTROINTESTINAL:  No nausea, vomiting or diarrhea.  GENITOURINARY:  No dysuria, frequency or urgency.  MUSCULOSKELETAL:  As per HPI.  SKIN:  No change in skin, hair or nails.  NEUROLOGIC:  No paresthesias, fasciculations, seizures or weakness.  PSYCHIATRIC:  No disorder of thought or mood.  ENDOCRINE:  No heat or cold intolerance, polyuria or polydipsia.  HEMATOLOGICAL:  No easy bruising or bleedings:  .     PHYSICAL EXAMINATION:    GENERAL APPEARANCE:  mild dyspnea  HEENT:  Pupils are normal and react normally. No icterus. Mucous membranes well colored.  NECK:  Supple. No lymphadenopathy. Jugular venous pressure not elevated. Carotids equal.   HEART:   S1S2 reg  CHEST:  diffuse exp ronchi  ABDOMEN:  Soft and nontender.   EXTREMITIES: positive edema    SKIN:  venous stasis changes  CNS:  AAO X 3; no focal deficits    LABS:                        14.0   8.18  )-----------( 235      ( 26 May 2022 12:29 )             43.9     05-26    142  |  106  |  17  ----------------------------<  114<H>  4.0   |  25  |  0.64    Ca    8.8      26 May 2022 12:29  Mg     1.6     05-26    TPro  7.2  /  Alb  3.2<L>  /  TBili  0.6  /  DBili  x   /  AST  35  /  ALT  76  /  AlkPhos  92  05-26    LIVER FUNCTIONS - ( 26 May 2022 12:29 )  Alb: 3.2 g/dL / Pro: 7.2 gm/dL / ALK PHOS: 92 U/L / ALT: 76 U/L / AST: 35 U/L / GGT: x           PT/INR - ( 27 May 2022 05:07 )   PT: 19.8 sec;   INR: 1.70 ratio         PTT - ( 26 May 2022 12:29 )  PTT:39.6 sec            RADIOLOGY & ADDITIONAL STUDIES:     
Patient is a 71y old  Female who presents with a chief complaint of Congestion and difficulty breathing (27 May 2022 08:27)    HPI:  70 y/o F PMHx significant for Atrial fibrillation on Coumadin, CHF (HFpEF), Diabetes Mellitus Type 2, peripheral neuropathy, peripheral venous insufficiency, presents to the [facility] for further evaluation and management of c/o symptoms of sore throat, nasal congestion, cough and worsening shortness of breath over the past 4 days. Of note the patient took a home COVID-19 test, resulting negative. The patient admits to a productive cough with chest congestion, and a low grade fever at home. In triage the patient was found to be in acute hypoxic respiratory distress => SpO2 90% on room air, conversational dyspnea with 4-5 word sentences, notable diffuse wheezing and no improvement via administration of IV steroids, or MDI inhaler. Of note the patient did attend a local family event recently. Labs => INR 2.36, proBNP 1450, RVP (+) hMPV. CXR => Slightly prominent bronchovascular markings in the right middle versus lower lobe, without definite evidence of lobar consolidation and may be accentuated by summation density arising from the adjacent rib cage and overlying right breast shadow. The remaining lungs are clear. CT Chest => New findings in the right lower lobe and left upper lobe since June 2019. In the ED the patient was given Methylprednisolone 125mg IVP x 1, Acetaminophen 1000mg PO x 1, Combivent nebs x 1, Albuterol MDI 2puffs x 1, and NS x 1L.     PMH: as above  PSH: as above    Meds: per reconciliation sheet, noted below  MEDICATIONS  (STANDING):  ALBUTerol    90 MICROgram(s) HFA Inhaler 2 Puff(s) Inhalation every 4 hours  albuterol/ipratropium for Nebulization 3 milliLiter(s) Nebulizer every 6 hours  buDESOnide    Inhalation Suspension 0.5 milliGRAM(s) Inhalation every 12 hours  cholecalciferol 1000 Unit(s) Oral daily  cholestyramine Powder (Sugar-Free) 4 Gram(s) Oral daily  dextrose 5%. 1000 milliLiter(s) (100 mL/Hr) IV Continuous <Continuous>  dextrose 5%. 1000 milliLiter(s) (50 mL/Hr) IV Continuous <Continuous>  dextrose 50% Injectable 25 Gram(s) IV Push once  dextrose 50% Injectable 12.5 Gram(s) IV Push once  dextrose 50% Injectable 25 Gram(s) IV Push once  ezetimibe 10 milliGRAM(s) Oral daily  gabapentin 800 milliGRAM(s) Oral three times a day  glucagon  Injectable 1 milliGRAM(s) IntraMuscular once  guaiFENesin  milliGRAM(s) Oral every 12 hours  insulin lispro (ADMELOG) corrective regimen sliding scale   SubCutaneous three times a day before meals  insulin lispro (ADMELOG) corrective regimen sliding scale   SubCutaneous at bedtime  methylPREDNISolone sodium succinate Injectable 40 milliGRAM(s) IV Push every 8 hours  metoprolol succinate  milliGRAM(s) Oral daily  montelukast 10 milliGRAM(s) Oral at bedtime  pravastatin 10 milliGRAM(s) Oral at bedtime  verapamil 120 milliGRAM(s) Oral two times a day  warfarin 5 milliGRAM(s) Oral daily    MEDICATIONS  (PRN):  ALBUTerol    90 MICROgram(s) HFA Inhaler 2 Puff(s) Inhalation every 6 hours PRN Bronchospasm  cholestyramine Powder (Sugar-Free) 4 Gram(s) Oral daily PRN GI upset  dextrose Oral Gel 15 Gram(s) Oral once PRN Blood Glucose LESS THAN 70 milliGRAM(s)/deciliter  furosemide    Tablet 80 milliGRAM(s) Oral two times a day PRN CHF  zolpidem 5 milliGRAM(s) Oral at bedtime PRN Insomnia  zolpidem 5 milliGRAM(s) Oral at bedtime PRN Insomnia    Allergies    penicillin (Hives)    Intolerances      Social: no smoking, no alcohol, no illegal drugs; no recent travel, no exposure to TB  FAMILY HISTORY:  Family history of breast cancer in mother    Family history of diabetes mellitus in mother       no history of premature cardiovascular disease in first degree relatives    ROS: the patient denies fever, no chills, no HA, no dizziness, no sore throat, no blurry vision, no CP, no palpitations, + SOB, + cough, no abdominal pain, no diarrhea, no N/V, no dysuria, no leg pain, no claudication, no rash, no joint aches, no rectal pain or bleeding, no night sweats    All other systems reviewed and are negative    Vital Signs Last 24 Hrs  T(C): 36.3 (27 May 2022 09:11), Max: 37.9 (26 May 2022 11:56)  T(F): 97.3 (27 May 2022 09:11), Max: 100.3 (26 May 2022 11:56)  HR: 85 (27 May 2022 08:00) (70 - 133)  BP: 123/112 (27 May 2022 08:00) (111/74 - 161/87)  BP(mean): 117 (27 May 2022 08:00) (74 - 117)  RR: 20 (27 May 2022 08:00) (17 - 29)  SpO2: 99% (27 May 2022 08:00) (88% - 99%)  Daily Height in cm: 177.8 (26 May 2022 11:53)    Daily     PE:  Constitutional: NAD   HEENT: NC/AT, EOMI, PERRLA, conjunctivae clear; ears and nose atraumatic; pharynx benign  Neck: supple; thyroid not palpable  Back: no tenderness  Respiratory: decreased breath sounds, rhonchi/wheezing   Cardiovascular: S1S2 regular, no murmurs  Abdomen: soft, not tender, not distended, positive BS; liver and spleen WNL  Genitourinary: no suprapubic tenderness  Lymphatic: no LN palpable  Musculoskeletal: no muscle tenderness, no joint swelling or tenderness  Extremities: no pedal edema, stasis changes along LEs   Neurological/ Psychiatric: moving all extremities  Skin: no rashes; no palpable lesions    Labs: all available labs reviewed                        14.0   8.18  )-----------( 235      ( 26 May 2022 12:29 )             43.9     05-26    142  |  106  |  17  ----------------------------<  114<H>  4.0   |  25  |  0.64    Ca    8.8      26 May 2022 12:29  Mg     1.6     05-26    TPro  7.2  /  Alb  3.2<L>  /  TBili  0.6  /  DBili  x   /  AST  35  /  ALT  76  /  AlkPhos  92  05-26     LIVER FUNCTIONS - ( 26 May 2022 12:29 )  Alb: 3.2 g/dL / Pro: 7.2 gm/dL / ALK PHOS: 92 U/L / ALT: 76 U/L / AST: 35 U/L / GGT: x                 Radiology: all available radiological tests reviewed  < from: CT Chest No Cont (05.26.22 @ 15:38) >  ACC: [id number] EXAM:  CT CHEST                          PROCEDURE DATE:  05/26/2022          INTERPRETATION:  INDICATION: Congestion and difficulty breathing    TECHNIQUE: Volumetric images of the chest without intravenous contrast.   Maximum intensity projection images were generated.    COMPARISON: CT chest 6/9/2019.    FINDINGS:    LUNGS/AIRWAYS/PLEURA: New consolidation, adjacent groundglass, and   clustered nodules in the right lower lobe. New few small nodules in the   apicoposterior left upper lobe. Patent airways to the subsegmental   bronchi. Unremarkable pleura.    LYMPH NODES/MEDIASTINUM: Small hiatal hernia. No enlarged lymph nodes.   Multiple subcentimeter thyroid nodules.    HEART/VASCULATURE: Normal heart size. Unremarkable pericardium. Normal   caliber aorta and pulmonary artery. Coronary artery and aortic   calcifications. Calcified aortic valve suggesting aortic stenosis.    UPPER ABDOMEN: Unremarkable.    BONES/SOFT TISSUES: Degenerative changes of the spine.      IMPRESSION:    New findings in the right lower lobe and left upper lobe since June 2019,   favored to be infection. 3 month follow-up is recommended.        Advanced directives addressed: full resuscitation

## 2022-05-27 NOTE — ED ADULT NURSE REASSESSMENT NOTE - NS ED NURSE REASSESS COMMENT FT1
MD Gallego contacted, informed pt. requesting home medication. MD Gallego to see pt. shortly and place medication orders.
addressed plan of care with pt. and family. pt. pending admission orders for bed placement.
pt. requesting home medication at this time. MD contacted, left message. awaiting admission orders.
Patient a-fib rate 120-130.  EVAN Bartlett aware.  Additional IVF ordered and started as per orders.  Rate control medication to be considered as per EVAN Morgan after 2nd liter completes.  Patient short of breath with 4 L NC in place MD Shelton and EVAN bartlett aware.  Oxygen Saturation is 92-98% on 4L with desaturation with movement.  Purewick in place.  NS Bolus infusing with no signs of infiltration noted.  Patient positioned for comfort with HOB elevated.

## 2022-05-27 NOTE — PROVIDER CONTACT NOTE (OTHER) - SITUATION
Left a voice message at nurses station ti inform dr that patient is in [facility].  Please fax discharge papers to [phone].
pt experiencing difficulty breathing; -170s. BP 170s/100s. Temp 101.5

## 2022-05-27 NOTE — PROGRESS NOTE ADULT - SUBJECTIVE AND OBJECTIVE BOX
CC: Congestion and difficulty breathing (27 May 2022 10:20)    HPI: 72 y/o F PMHx significant for Atrial fibrillation on Coumadin, CHF (HFpEF), Diabetes Mellitus Type 2, peripheral neuropathy, peripheral venous insufficiency, presents to the [facility] for further evaluation and management of c/o symptoms of sore throat, nasal congestion, cough and worsening shortness of breath over the past 4 days. Of note the patient took a home COVID-19 test, resulting negative. The patient admits to a productive cough with chest congestion. and a low grade fever today at home. In triage the patient was found to be in acute hypoxic respiratory distress => SpO2 90% on room air, conversational dyspnea with 4-5 word sentences, notable diffuse wheezing and no improvement via administration of IV steroids, or MDI inhaler. Of note the patient did attend a local family event recently.  Labs => INR 2.36, proBNP 1450, RVP (+) hMPV. CXR => Slightly prominent bronchovascular markings in the right middle versus lower lobe, without definite evidence of lobar consolidation and may be accentuated by summation density arising from the adjacent rib cage and overlying right breast shadow. If there are abnormal breath sounds on auscultation in this region, evolving pneumonia would then be considered. The remaining lungs are clear. Consider short-term follow-up including full inspiratory PA and lateral view if possible.  CT Chest => New findings in the right lower lobe and left upper lobe since June 2019, favored to be infection. 3 month follow-up is recommended.   In the ED the patient was given Methylprednisolone 125mg IVP x 1, Acetaminophen 1000mg PO x 1, Combivent nebs x 1, Albuterol MDI 2puffs x 1, and NS x 1L.   (27 May 2022 00:49)    INTERVAL HPI/OVERNIGHT EVENTS: pt is weak, tired, wheezing  Other ROS reviewed and neg     Vital Signs Last 24 Hrs  T(C): 36.3 (27 May 2022 09:11), Max: 37.2 (27 May 2022 02:59)  T(F): 97.3 (27 May 2022 09:11), Max: 98.9 (27 May 2022 02:59)  HR: 107 (27 May 2022 15:00) (70 - 124)  BP: 131/81 (27 May 2022 15:00) (92/60 - 161/87)  BP(mean): 93 (27 May 2022 15:00) (71 - 117)  RR: 17 (27 May 2022 15:00) (17 - 29)  SpO2: 94% (27 May 2022 15:00) (92% - 99%)  I&O's Detail    26 May 2022 07:01  -  27 May 2022 07:00  --------------------------------------------------------  IN:  Total IN: 0 mL    OUT:    Voided (mL): 350 mL  Total OUT: 350 mL    Total NET: -350 mL                        14.0   8.18  )-----------( 235      ( 26 May 2022 12:29 )             43.9     26 May 2022 12:29    142    |  106    |  17     ----------------------------<  114    4.0     |  25     |  0.64     Ca    8.8        26 May 2022 12:29  Mg     1.6       26 May 2022 12:29    TPro  7.2    /  Alb  3.2    /  TBili  0.6    /  DBili  x      /  AST  35     /  ALT  76     /  AlkPhos  92     26 May 2022 12:29    PT/INR - ( 27 May 2022 05:07 )   PT: 19.8 sec;   INR: 1.70 ratio         PTT - ( 26 May 2022 12:29 )  PTT:39.6 sec  CAPILLARY BLOOD GLUCOSE      POCT Blood Glucose.: 112 mg/dL (27 May 2022 12:22)  POCT Blood Glucose.: 113 mg/dL (27 May 2022 08:13)    LIVER FUNCTIONS - ( 26 May 2022 12:29 )  Alb: 3.2 g/dL / Pro: 7.2 gm/dL / ALK PHOS: 92 U/L / ALT: 76 U/L / AST: 35 U/L / GGT: x           MEDICATIONS  (STANDING):  ALBUTerol    90 MICROgram(s) HFA Inhaler 2 Puff(s) Inhalation every 4 hours  albuterol/ipratropium for Nebulization 3 milliLiter(s) Nebulizer every 6 hours  azithromycin  IVPB      buDESOnide    Inhalation Suspension 0.5 milliGRAM(s) Inhalation every 12 hours  cefepime   IVPB 2000 milliGRAM(s) IV Intermittent every 12 hours  cholecalciferol 1000 Unit(s) Oral daily  cholestyramine Powder (Sugar-Free) 4 Gram(s) Oral daily  dextrose 5%. 1000 milliLiter(s) (100 mL/Hr) IV Continuous <Continuous>  dextrose 5%. 1000 milliLiter(s) (50 mL/Hr) IV Continuous <Continuous>  dextrose 50% Injectable 25 Gram(s) IV Push once  dextrose 50% Injectable 12.5 Gram(s) IV Push once  dextrose 50% Injectable 25 Gram(s) IV Push once  ezetimibe 10 milliGRAM(s) Oral daily  gabapentin 800 milliGRAM(s) Oral three times a day  glucagon  Injectable 1 milliGRAM(s) IntraMuscular once  guaiFENesin  milliGRAM(s) Oral every 12 hours  insulin lispro (ADMELOG) corrective regimen sliding scale   SubCutaneous three times a day before meals  insulin lispro (ADMELOG) corrective regimen sliding scale   SubCutaneous at bedtime  methylPREDNISolone sodium succinate Injectable 40 milliGRAM(s) IV Push every 8 hours  metoprolol succinate  milliGRAM(s) Oral daily  montelukast 10 milliGRAM(s) Oral at bedtime  pravastatin 10 milliGRAM(s) Oral at bedtime  verapamil 120 milliGRAM(s) Oral two times a day  warfarin 5 milliGRAM(s) Oral daily    MEDICATIONS  (PRN):  ALBUTerol    90 MICROgram(s) HFA Inhaler 2 Puff(s) Inhalation every 6 hours PRN Bronchospasm  cholestyramine Powder (Sugar-Free) 4 Gram(s) Oral daily PRN GI upset  dextrose Oral Gel 15 Gram(s) Oral once PRN Blood Glucose LESS THAN 70 milliGRAM(s)/deciliter  furosemide    Tablet 80 milliGRAM(s) Oral two times a day PRN CHF  zolpidem 5 milliGRAM(s) Oral at bedtime PRN Insomnia  zolpidem 5 milliGRAM(s) Oral at bedtime PRN Insomnia    RADIOLOGY & ADDITIONAL TESTS: personally visualized    PHYSICAL EXAM:    General: obese female in mild acute respiratory distress  Eyes: PERRLA, EOMI; conjunctiva and sclera clear  Head: Normocephalic; atraumatic  ENMT: No nasal discharge; airway clear  Neck: Supple; non tender; no masses  Respiratory: + BL wheezing and rhonchi  Cardiovascular: S1, S2 reg  Gastrointestinal: Soft non-tender non-distended; Normal bowel sounds  Genitourinary: No costovertebral angle tenderness  Extremities: No clubbing, cyanosis, + 1 BL LE chronic edema  Vascular: Peripheral pulses palpable 2+ bilaterally, + venous stasis  Neurological: Alert and oriented x4  Skin: Warm and dry. + venous stasis dermatosis  Musculoskeletal: Normal tone  Psychiatric: Cooperative and appropriate

## 2022-05-27 NOTE — CONSULT NOTE ADULT - ASSESSMENT
72 y/o F PMHx significant for Atrial fibrillation on Coumadin, CHF (HFpEF), Diabetes Mellitus Type 2, peripheral neuropathy, peripheral venous insufficiency, presents to the [facility] for further evaluation and management of c/o symptoms of sore throat, nasal congestion, cough and worsening shortness of breath over the past 4 days. Of note the patient took a home COVID-19 test, resulting negative. The patient admits to a productive cough with chest congestion, and a low grade fever at home. In triage the patient was found to be in acute hypoxic respiratory distress => SpO2 90% on room air, conversational dyspnea with 4-5 word sentences, notable diffuse wheezing and no improvement via administration of IV steroids, or MDI inhaler. Of note the patient did attend a local family event recently. Labs => INR 2.36, proBNP 1450, RVP (+) hMPV. CXR => Slightly prominent bronchovascular markings in the right middle versus lower lobe, without definite evidence of lobar consolidation and may be accentuated by summation density arising from the adjacent rib cage and overlying right breast shadow. The remaining lungs are clear. CT Chest => New findings in the right lower lobe and left upper lobe since June 2019. In the ED the patient was given Methylprednisolone 125mg IVP x 1, Acetaminophen 1000mg PO x 1, Combivent nebs x 1, Albuterol MDI 2puffs x 1, and NS x 1L.     1. Acute respiratory failure. Viral syndrome with HMPV. RLL pneumonia/mucoid impaction. PCN allergy  - pcn allergy - hives as a child  - will start cefepime [id number]  - monitor for rash/side effects, low risk cross reactivity with pcn allergy   - start azithromycin 500mg daily  - continue with antibiotic coverage  - on IV steroids for bronchospasm  - pulmonary eval noted  - fu cultures  - monitor temps  - tolerating abx well so far; no side effects noted  - reason for abx use and side effects reviewed with patient  - supportive care  - fu cbc    2. other issues - care per medicine 
- has hMPV infection w diffuse bronchospasm; cont isolation  - CT scan shows RLL infiltrate w mucoid impaction  - patient is steroid dependent  - d/c prednisone and start solumedrol; will need BS coverage  - albuterol/ipratropium + budesonide nebs  - has focal RLL infiltrate. Rx as per ID  - dvt proph

## 2022-05-27 NOTE — H&P ADULT - HISTORY OF PRESENT ILLNESS
Labs => INR 2.36, proBNP 1450, RVP (+) hMPV. CXR => Slightly prominent bronchovascular markings in the right middle versus lower lobe, without definite evidence of lobar consolidation and may be accentuated by summation density arising from the adjacent rib cage and overlying right breast shadow. If there are abnormal breath sounds on auscultation in this region, evolving pneumonia would then be considered. The remaining lungs are clear. Consider short-term follow-up including full inspiratory PA and lateral view if possible.  CT Chest => New findings in the right lower lobe and left upper lobe since June 2019, favored to be infection. 3 month follow-up is recommended.   In the ED the patient was given Methylprednisolone 125mg IVP x 1, Acetaminophen 1000mg PO x 1, Combivent nebs x 1, Albuterol MDI 2puffs x 1, and NS x 1L   70 y/o F PMHx significant for Atrial fibrillation on Coumadin, CHF (HFpEF), Diabetes Mellitus Type 2, peripheral neuropathy, peripheral venous insufficiency, presents to the [facility] for further evaluation and management of c/o symptoms of sore throat, nasal congestion, cough and worsening shortness of breath over the past 4 days. Of note the patient took a home COVID-19 test, resulting negative. The patient admits to a productive cough with chest congestion. and a low grade fever today at home. In triage the patient was found to be in acute hypoxic respiratory distress => SpO2 90% on room air, conversational dyspnea with 4-5 word sentences, notable diffuse wheezing and no improvement via administration of IV steroids, or MDI inhaler. Of note the patient did attend a local family event recently.  Labs => INR 2.36, proBNP 1450, RVP (+) hMPV. CXR => Slightly prominent bronchovascular markings in the right middle versus lower lobe, without definite evidence of lobar consolidation and may be accentuated by summation density arising from the adjacent rib cage and overlying right breast shadow. If there are abnormal breath sounds on auscultation in this region, evolving pneumonia would then be considered. The remaining lungs are clear. Consider short-term follow-up including full inspiratory PA and lateral view if possible.  CT Chest => New findings in the right lower lobe and left upper lobe since June 2019, favored to be infection. 3 month follow-up is recommended.   In the ED the patient was given Methylprednisolone 125mg IVP x 1, Acetaminophen 1000mg PO x 1, Combivent nebs x 1, Albuterol MDI 2puffs x 1, and NS x 1L.

## 2022-05-28 LAB
ANION GAP SERPL CALC-SCNC: 7 MMOL/L — SIGNIFICANT CHANGE UP (ref 5–17)
BUN SERPL-MCNC: 18 MG/DL — SIGNIFICANT CHANGE UP (ref 7–23)
CALCIUM SERPL-MCNC: 8.7 MG/DL — SIGNIFICANT CHANGE UP (ref 8.5–10.1)
CHLORIDE SERPL-SCNC: 106 MMOL/L — SIGNIFICANT CHANGE UP (ref 96–108)
CO2 SERPL-SCNC: 26 MMOL/L — SIGNIFICANT CHANGE UP (ref 22–31)
CREAT SERPL-MCNC: 0.65 MG/DL — SIGNIFICANT CHANGE UP (ref 0.5–1.3)
EGFR: 93 ML/MIN/1.73M2 — SIGNIFICANT CHANGE UP
GLUCOSE SERPL-MCNC: 187 MG/DL — HIGH (ref 70–99)
HCT VFR BLD CALC: 45.5 % — HIGH (ref 34.5–45)
HGB BLD-MCNC: 14 G/DL — SIGNIFICANT CHANGE UP (ref 11.5–15.5)
INR BLD: 1.45 RATIO — HIGH (ref 0.88–1.16)
LEGIONELLA AG UR QL: NEGATIVE — SIGNIFICANT CHANGE UP
MCHC RBC-ENTMCNC: 30.4 PG — SIGNIFICANT CHANGE UP (ref 27–34)
MCHC RBC-ENTMCNC: 30.8 GM/DL — LOW (ref 32–36)
MCV RBC AUTO: 98.7 FL — SIGNIFICANT CHANGE UP (ref 80–100)
PLATELET # BLD AUTO: 234 K/UL — SIGNIFICANT CHANGE UP (ref 150–400)
POTASSIUM SERPL-MCNC: 3.9 MMOL/L — SIGNIFICANT CHANGE UP (ref 3.5–5.3)
POTASSIUM SERPL-SCNC: 3.9 MMOL/L — SIGNIFICANT CHANGE UP (ref 3.5–5.3)
PROTHROM AB SERPL-ACNC: 16.9 SEC — HIGH (ref 10.5–13.4)
RBC # BLD: 4.61 M/UL — SIGNIFICANT CHANGE UP (ref 3.8–5.2)
RBC # FLD: 14.2 % — SIGNIFICANT CHANGE UP (ref 10.3–14.5)
SODIUM SERPL-SCNC: 139 MMOL/L — SIGNIFICANT CHANGE UP (ref 135–145)
WBC # BLD: 19.78 K/UL — HIGH (ref 3.8–10.5)
WBC # FLD AUTO: 19.78 K/UL — HIGH (ref 3.8–10.5)

## 2022-05-28 PROCEDURE — 99233 SBSQ HOSP IP/OBS HIGH 50: CPT

## 2022-05-28 RX ADMIN — Medication 1: at 09:03

## 2022-05-28 RX ADMIN — CEFEPIME 100 MILLIGRAM(S): 1 INJECTION, POWDER, FOR SOLUTION INTRAMUSCULAR; INTRAVENOUS at 10:00

## 2022-05-28 RX ADMIN — Medication 120 MILLIGRAM(S): at 22:29

## 2022-05-28 RX ADMIN — Medication 0.5 MILLIGRAM(S): at 09:37

## 2022-05-28 RX ADMIN — GABAPENTIN 800 MILLIGRAM(S): 400 CAPSULE ORAL at 22:15

## 2022-05-28 RX ADMIN — Medication 4: at 18:11

## 2022-05-28 RX ADMIN — Medication 2: at 13:52

## 2022-05-28 RX ADMIN — AZITHROMYCIN 255 MILLIGRAM(S): 500 TABLET, FILM COATED ORAL at 13:46

## 2022-05-28 RX ADMIN — Medication 40 MILLIGRAM(S): at 13:48

## 2022-05-28 RX ADMIN — Medication 120 MILLIGRAM(S): at 10:01

## 2022-05-28 RX ADMIN — GABAPENTIN 800 MILLIGRAM(S): 400 CAPSULE ORAL at 13:48

## 2022-05-28 RX ADMIN — Medication 3 MILLILITER(S): at 09:37

## 2022-05-28 RX ADMIN — Medication 0.5 MILLIGRAM(S): at 20:57

## 2022-05-28 RX ADMIN — Medication 650 MILLIGRAM(S): at 22:55

## 2022-05-28 RX ADMIN — Medication 100 MILLIGRAM(S): at 10:01

## 2022-05-28 RX ADMIN — Medication 600 MILLIGRAM(S): at 22:15

## 2022-05-28 RX ADMIN — CEFEPIME 100 MILLIGRAM(S): 1 INJECTION, POWDER, FOR SOLUTION INTRAMUSCULAR; INTRAVENOUS at 22:13

## 2022-05-28 RX ADMIN — Medication 40 MILLIGRAM(S): at 22:16

## 2022-05-28 RX ADMIN — WARFARIN SODIUM 5 MILLIGRAM(S): 2.5 TABLET ORAL at 22:16

## 2022-05-28 RX ADMIN — CHOLESTYRAMINE 4 GRAM(S): 4 POWDER, FOR SUSPENSION ORAL at 08:50

## 2022-05-28 RX ADMIN — GABAPENTIN 800 MILLIGRAM(S): 400 CAPSULE ORAL at 05:07

## 2022-05-28 RX ADMIN — Medication 3 MILLILITER(S): at 20:57

## 2022-05-28 RX ADMIN — Medication 40 MILLIGRAM(S): at 05:08

## 2022-05-28 RX ADMIN — Medication 3 MILLILITER(S): at 16:01

## 2022-05-28 RX ADMIN — EZETIMIBE 10 MILLIGRAM(S): 10 TABLET ORAL at 22:15

## 2022-05-28 RX ADMIN — Medication 1000 UNIT(S): at 10:00

## 2022-05-28 RX ADMIN — MONTELUKAST 10 MILLIGRAM(S): 4 TABLET, CHEWABLE ORAL at 22:14

## 2022-05-28 RX ADMIN — Medication 650 MILLIGRAM(S): at 23:30

## 2022-05-28 RX ADMIN — Medication 10 MILLIGRAM(S): at 22:17

## 2022-05-28 RX ADMIN — Medication 600 MILLIGRAM(S): at 10:01

## 2022-05-28 NOTE — PROGRESS NOTE ADULT - SUBJECTIVE AND OBJECTIVE BOX
Subjective:  less dyspneic  had fever 102 degrees last nite w sob  responded to lasix    MEDICATIONS  (STANDING):  ALBUTerol    90 MICROgram(s) HFA Inhaler 2 Puff(s) Inhalation every 4 hours  albuterol/ipratropium for Nebulization 3 milliLiter(s) Nebulizer every 6 hours  azithromycin  IVPB      azithromycin  IVPB 500 milliGRAM(s) IV Intermittent every 24 hours  buDESOnide    Inhalation Suspension 0.5 milliGRAM(s) Inhalation every 12 hours  cefepime   IVPB 2000 milliGRAM(s) IV Intermittent every 12 hours  cholecalciferol 1000 Unit(s) Oral daily  cholestyramine Powder (Sugar-Free) 4 Gram(s) Oral daily  dextrose 5%. 1000 milliLiter(s) (100 mL/Hr) IV Continuous <Continuous>  dextrose 5%. 1000 milliLiter(s) (50 mL/Hr) IV Continuous <Continuous>  dextrose 50% Injectable 25 Gram(s) IV Push once  dextrose 50% Injectable 12.5 Gram(s) IV Push once  dextrose 50% Injectable 25 Gram(s) IV Push once  ezetimibe 10 milliGRAM(s) Oral daily  gabapentin 800 milliGRAM(s) Oral three times a day  glucagon  Injectable 1 milliGRAM(s) IntraMuscular once  guaiFENesin  milliGRAM(s) Oral every 12 hours  insulin lispro (ADMELOG) corrective regimen sliding scale   SubCutaneous three times a day before meals  insulin lispro (ADMELOG) corrective regimen sliding scale   SubCutaneous at bedtime  methylPREDNISolone sodium succinate Injectable 40 milliGRAM(s) IV Push every 8 hours  metoprolol succinate  milliGRAM(s) Oral daily  montelukast 10 milliGRAM(s) Oral at bedtime  pravastatin 10 milliGRAM(s) Oral at bedtime  verapamil 120 milliGRAM(s) Oral two times a day  warfarin 5 milliGRAM(s) Oral daily    MEDICATIONS  (PRN):  acetaminophen     Tablet .. 650 milliGRAM(s) Oral every 6 hours PRN Temp greater or equal to 38C (100.4F), Mild Pain (1 - 3)  ALBUTerol    90 MICROgram(s) HFA Inhaler 2 Puff(s) Inhalation every 6 hours PRN Bronchospasm  cholestyramine Powder (Sugar-Free) 4 Gram(s) Oral daily PRN GI upset  dextrose Oral Gel 15 Gram(s) Oral once PRN Blood Glucose LESS THAN 70 milliGRAM(s)/deciliter  furosemide    Tablet 80 milliGRAM(s) Oral two times a day PRN CHF  zolpidem 5 milliGRAM(s) Oral at bedtime PRN Insomnia  zolpidem 5 milliGRAM(s) Oral at bedtime PRN Insomnia      Allergies    penicillin (Hives)    Intolerances        REVIEW OF SYSTEMS:    CONSTITUTIONAL:  As per HPI.  HEENT:  Eyes:  No diplopia or blurred vision. ENT:  No earache, sore throat or runny nose.  CARDIOVASCULAR:  No pressure, squeezing, tightness, heaviness or aching about the chest, neck, axilla or epigastrium.  RESPIRATORY:  No cough, shortness of breath, PND or orthopnea.  GASTROINTESTINAL:  No nausea, vomiting or diarrhea.  GENITOURINARY:  No dysuria, frequency or urgency.  MUSCULOSKELETAL:  no joint pain, deformity, tenderness  EXTREMITIES: no clubbing cyanosis,edema  SKIN:  No change in skin, hair or nails.  NEUROLOGIC:  No paresthesias, fasciculations, seizures or weakness.  PSYCHIATRIC:  No disorder of thought or mood.  ENDOCRINE:  No heat or cold intolerance, polyuria or polydipsia.  HEMATOLOGICAL:  No easy bruising or bleedings:    Vital Signs Last 24 Hrs  T(C): 36.9 (28 May 2022 06:20), Max: 39.1 (27 May 2022 23:00)  T(F): 98.5 (28 May 2022 06:20), Max: 102.3 (27 May 2022 23:00)  HR: 87 (28 May 2022 09:00) (75 - 148)  BP: 182/92 (28 May 2022 09:00) (92/66 - 182/92)  BP(mean): 116 (28 May 2022 09:00) (71 - 156)  RR: 25 (28 May 2022 09:00) (13 - 26)  SpO2: 98% (28 May 2022 09:00) (92% - 99%)    PHYSICAL EXAMINATION:  SKIN: no rashes  HEAD: NC/AT  EYES: PERRLA, EOMI  EARS: TM's intact  NOSE: no abnormalities  NECK:  Supple. No lymphadenopathy. Jugular venous pressure not elevated. Carotids equal.   HEART:   The cardiac impulse has a normal quality. Reg., Nl S1 and S2.  There are no murmurs, rubs or gallops noted  CHEST: scattered exp ronchi; crackles right base  ABDOMEN:  Soft and nontender.   EXTREMITIES:  no C/C/E  NEURO: AAO x 3, no focal deficts       LABS:                        14.0   19.78 )-----------( 234      ( 28 May 2022 05:54 )             45.5     05-28    139  |  106  |  18  ----------------------------<  187<H>  3.9   |  26  |  0.65    Ca    8.7      28 May 2022 05:54  Mg     1.6     05-26    TPro  7.2  /  Alb  3.2<L>  /  TBili  0.6  /  DBili  x   /  AST  35  /  ALT  76  /  AlkPhos  92  05-26    PT/INR - ( 28 May 2022 05:54 )   PT: 16.9 sec;   INR: 1.45 ratio         PTT - ( 26 May 2022 12:29 )  PTT:39.6 sec      RADIOLOGY & ADDITIONAL TESTS:

## 2022-05-28 NOTE — PROGRESS NOTE ADULT - ASSESSMENT
#Acute Hypoxic Respiratory Failure due to hMPV with right sided infiltrate   - initiated IV abx + steroids, BDs and O2 as discussed with Dr. Tyler and Dr. Martinez    # Afib chronic on VKA with subtherapeutic INR - no bridging    # HFpEF - compensated    # DMII with DPNP - HISS, monitor on steroids, cont gabapentin    # HLD - statin    Discussed as above  All chart reviewed.  Time spent 47 min

## 2022-05-28 NOTE — PROGRESS NOTE ADULT - ASSESSMENT
- has hMPV infection w diffuse bronchospasm; cont isolation  - bronchospasm improvrd  - CT scan shows RLL infiltrate w mucoid impaction  - patient is steroid dependent; takes 10mg per day for legs  - d/c prednisone and start solumedrol; will need BS coverage  - albuterol/ipratropium + budesonide nebs  - has focal RLL infiltrate. On cefeime/zithromax  - dvt proph

## 2022-05-28 NOTE — PROGRESS NOTE ADULT - SUBJECTIVE AND OBJECTIVE BOX
CC: Congestion and difficulty breathing (27 May 2022 10:20)    HPI: 70 y/o F PMHx significant for Atrial fibrillation on Coumadin, CHF (HFpEF), Diabetes Mellitus Type 2, peripheral neuropathy, peripheral venous insufficiency, presents to the [facility] for further evaluation and management of c/o symptoms of sore throat, nasal congestion, cough and worsening shortness of breath over the past 4 days. Of note the patient took a home COVID-19 test, resulting negative. The patient admits to a productive cough with chest congestion. and a low grade fever today at home. In triage the patient was found to be in acute hypoxic respiratory distress => SpO2 90% on room air, conversational dyspnea with 4-5 word sentences, notable diffuse wheezing and no improvement via administration of IV steroids, or MDI inhaler. Of note the patient did attend a local family event recently.  Labs => INR 2.36, proBNP 1450, RVP (+) hMPV. CXR => Slightly prominent bronchovascular markings in the right middle versus lower lobe, without definite evidence of lobar consolidation and may be accentuated by summation density arising from the adjacent rib cage and overlying right breast shadow. If there are abnormal breath sounds on auscultation in this region, evolving pneumonia would then be considered. The remaining lungs are clear. Consider short-term follow-up including full inspiratory PA and lateral view if possible.  CT Chest => New findings in the right lower lobe and left upper lobe since June 2019, favored to be infection. 3 month follow-up is recommended.   In the ED the patient was given Methylprednisolone 125mg IVP x 1, Acetaminophen 1000mg PO x 1, Combivent nebs x 1, Albuterol MDI 2puffs x 1, and NS x 1L.   (27 May 2022 00:49)    INTERVAL HPI/OVERNIGHT EVENTS: pt is weak, tired, less wheezing, elevated BP - responds to regular meds  Other ROS reviewed and neg     T(C): 36.2 (05-28-22 @ 12:00), Max: 39.1 (05-27-22 @ 23:00)  HR: 85 (05-28-22 @ 14:00) (69 - 148)  BP: 94/69 (05-28-22 @ 14:00) (92/66 - 182/92)  RR: 13 (05-28-22 @ 14:00) (13 - 26)  SpO2: 99% (05-28-22 @ 14:00) (92% - 99%)    05-27-22 @ 07:01  -  05-28-22 @ 07:00  --------------------------------------------------------  IN: 0 mL / OUT: 1250 mL / NET: -1250 mL    05-28-22 @ 07:01  -  05-28-22 @ 15:00  --------------------------------------------------------  IN: 350 mL / OUT: 800 mL / NET: -450 mL                        14.0   19.78 )-----------( 234      ( 28 May 2022 05:54 )             45.5     28 May 2022 05:54    139    |  106    |  18     ----------------------------<  187    3.9     |  26     |  0.65     Ca    8.7        28 May 2022 05:54      PT/INR - ( 28 May 2022 05:54 )   PT: 16.9 sec;   INR: 1.45 ratio      CAPILLARY BLOOD GLUCOSE    POCT Blood Glucose.: 208 mg/dL (28 May 2022 13:45)  POCT Blood Glucose.: 161 mg/dL (28 May 2022 08:50)  POCT Blood Glucose.: 122 mg/dL (27 May 2022 21:16)  POCT Blood Glucose.: 185 mg/dL (27 May 2022 17:17)    acetaminophen     Tablet .. 650 milliGRAM(s) Oral every 6 hours PRN  ALBUTerol    90 MICROgram(s) HFA Inhaler 2 Puff(s) Inhalation every 6 hours PRN  ALBUTerol    90 MICROgram(s) HFA Inhaler 2 Puff(s) Inhalation every 4 hours  albuterol/ipratropium for Nebulization 3 milliLiter(s) Nebulizer every 6 hours  azithromycin  IVPB      azithromycin  IVPB 500 milliGRAM(s) IV Intermittent every 24 hours  buDESOnide    Inhalation Suspension 0.5 milliGRAM(s) Inhalation every 12 hours  cefepime   IVPB 2000 milliGRAM(s) IV Intermittent every 12 hours  cholecalciferol 1000 Unit(s) Oral daily  cholestyramine Powder (Sugar-Free) 4 Gram(s) Oral daily  cholestyramine Powder (Sugar-Free) 4 Gram(s) Oral daily PRN  dextrose 5%. 1000 milliLiter(s) IV Continuous <Continuous>  dextrose 5%. 1000 milliLiter(s) IV Continuous <Continuous>  dextrose 50% Injectable 25 Gram(s) IV Push once  dextrose 50% Injectable 12.5 Gram(s) IV Push once  dextrose 50% Injectable 25 Gram(s) IV Push once  dextrose Oral Gel 15 Gram(s) Oral once PRN  ezetimibe 10 milliGRAM(s) Oral daily  furosemide    Tablet 80 milliGRAM(s) Oral two times a day PRN  gabapentin 800 milliGRAM(s) Oral three times a day  glucagon  Injectable 1 milliGRAM(s) IntraMuscular once  guaiFENesin  milliGRAM(s) Oral every 12 hours  insulin lispro (ADMELOG) corrective regimen sliding scale   SubCutaneous three times a day before meals  insulin lispro (ADMELOG) corrective regimen sliding scale   SubCutaneous at bedtime  methylPREDNISolone sodium succinate Injectable 40 milliGRAM(s) IV Push every 8 hours  metoprolol succinate  milliGRAM(s) Oral daily  montelukast 10 milliGRAM(s) Oral at bedtime  pravastatin 10 milliGRAM(s) Oral at bedtime  verapamil 120 milliGRAM(s) Oral two times a day  warfarin 5 milliGRAM(s) Oral daily  zolpidem 5 milliGRAM(s) Oral at bedtime PRN  zolpidem 5 milliGRAM(s) Oral at bedtime PRN    RADIOLOGY & ADDITIONAL TESTS: personally visualized    PHYSICAL EXAM:    General: obese female in mild acute respiratory distress  Eyes: PERRLA, EOMI; conjunctiva and sclera clear  Head: Normocephalic; atraumatic  ENMT: No nasal discharge; airway clear  Neck: Supple; non tender; no masses  Respiratory: + diffuse rhonchi, no significant wheezing  Cardiovascular: S1, S2 reg  Gastrointestinal: Soft non-tender non-distended; Normal bowel sounds  Genitourinary: No costovertebral angle tenderness  Extremities: No clubbing, cyanosis, + 1 BL LE chronic edema  Vascular: Peripheral pulses palpable 2+ bilaterally, + venous stasis  Neurological: Alert and oriented x4  Skin: Warm and dry. + venous stasis dermatosis  Musculoskeletal: Normal tone  Psychiatric: Cooperative and appropriate

## 2022-05-29 LAB
ANION GAP SERPL CALC-SCNC: 6 MMOL/L — SIGNIFICANT CHANGE UP (ref 5–17)
BUN SERPL-MCNC: 25 MG/DL — HIGH (ref 7–23)
CALCIUM SERPL-MCNC: 8.5 MG/DL — SIGNIFICANT CHANGE UP (ref 8.5–10.1)
CHLORIDE SERPL-SCNC: 105 MMOL/L — SIGNIFICANT CHANGE UP (ref 96–108)
CO2 SERPL-SCNC: 29 MMOL/L — SIGNIFICANT CHANGE UP (ref 22–31)
CREAT SERPL-MCNC: 0.52 MG/DL — SIGNIFICANT CHANGE UP (ref 0.5–1.3)
EGFR: 99 ML/MIN/1.73M2 — SIGNIFICANT CHANGE UP
GLUCOSE SERPL-MCNC: 148 MG/DL — HIGH (ref 70–99)
HCT VFR BLD CALC: 44.3 % — SIGNIFICANT CHANGE UP (ref 34.5–45)
HGB BLD-MCNC: 14 G/DL — SIGNIFICANT CHANGE UP (ref 11.5–15.5)
MCHC RBC-ENTMCNC: 30.9 PG — SIGNIFICANT CHANGE UP (ref 27–34)
MCHC RBC-ENTMCNC: 31.6 GM/DL — LOW (ref 32–36)
MCV RBC AUTO: 97.8 FL — SIGNIFICANT CHANGE UP (ref 80–100)
PLATELET # BLD AUTO: 238 K/UL — SIGNIFICANT CHANGE UP (ref 150–400)
POTASSIUM SERPL-MCNC: 4.1 MMOL/L — SIGNIFICANT CHANGE UP (ref 3.5–5.3)
POTASSIUM SERPL-SCNC: 4.1 MMOL/L — SIGNIFICANT CHANGE UP (ref 3.5–5.3)
RBC # BLD: 4.53 M/UL — SIGNIFICANT CHANGE UP (ref 3.8–5.2)
RBC # FLD: 13.7 % — SIGNIFICANT CHANGE UP (ref 10.3–14.5)
SODIUM SERPL-SCNC: 140 MMOL/L — SIGNIFICANT CHANGE UP (ref 135–145)
WBC # BLD: 14.8 K/UL — HIGH (ref 3.8–10.5)
WBC # FLD AUTO: 14.8 K/UL — HIGH (ref 3.8–10.5)

## 2022-05-29 PROCEDURE — 99233 SBSQ HOSP IP/OBS HIGH 50: CPT

## 2022-05-29 PROCEDURE — 99232 SBSQ HOSP IP/OBS MODERATE 35: CPT

## 2022-05-29 RX ADMIN — Medication 40 MILLIGRAM(S): at 22:53

## 2022-05-29 RX ADMIN — Medication 120 MILLIGRAM(S): at 22:49

## 2022-05-29 RX ADMIN — AZITHROMYCIN 255 MILLIGRAM(S): 500 TABLET, FILM COATED ORAL at 11:17

## 2022-05-29 RX ADMIN — Medication 10 MILLIGRAM(S): at 22:50

## 2022-05-29 RX ADMIN — CEFEPIME 100 MILLIGRAM(S): 1 INJECTION, POWDER, FOR SOLUTION INTRAMUSCULAR; INTRAVENOUS at 22:50

## 2022-05-29 RX ADMIN — Medication 3: at 17:21

## 2022-05-29 RX ADMIN — Medication 40 MILLIGRAM(S): at 13:21

## 2022-05-29 RX ADMIN — GABAPENTIN 800 MILLIGRAM(S): 400 CAPSULE ORAL at 07:38

## 2022-05-29 RX ADMIN — EZETIMIBE 10 MILLIGRAM(S): 10 TABLET ORAL at 22:51

## 2022-05-29 RX ADMIN — Medication 80 MILLIGRAM(S): at 09:47

## 2022-05-29 RX ADMIN — GABAPENTIN 800 MILLIGRAM(S): 400 CAPSULE ORAL at 13:22

## 2022-05-29 RX ADMIN — Medication 4: at 12:04

## 2022-05-29 RX ADMIN — Medication 100 MILLIGRAM(S): at 09:46

## 2022-05-29 RX ADMIN — Medication 3 MILLILITER(S): at 08:02

## 2022-05-29 RX ADMIN — Medication 3 MILLILITER(S): at 15:07

## 2022-05-29 RX ADMIN — Medication 120 MILLIGRAM(S): at 09:46

## 2022-05-29 RX ADMIN — Medication 1000 UNIT(S): at 09:47

## 2022-05-29 RX ADMIN — MONTELUKAST 10 MILLIGRAM(S): 4 TABLET, CHEWABLE ORAL at 22:53

## 2022-05-29 RX ADMIN — Medication 0.5 MILLIGRAM(S): at 20:19

## 2022-05-29 RX ADMIN — Medication 0.5 MILLIGRAM(S): at 08:02

## 2022-05-29 RX ADMIN — CHOLESTYRAMINE 4 GRAM(S): 4 POWDER, FOR SUSPENSION ORAL at 08:31

## 2022-05-29 RX ADMIN — CEFEPIME 100 MILLIGRAM(S): 1 INJECTION, POWDER, FOR SOLUTION INTRAMUSCULAR; INTRAVENOUS at 09:46

## 2022-05-29 RX ADMIN — Medication 40 MILLIGRAM(S): at 06:41

## 2022-05-29 RX ADMIN — WARFARIN SODIUM 5 MILLIGRAM(S): 2.5 TABLET ORAL at 22:51

## 2022-05-29 RX ADMIN — GABAPENTIN 800 MILLIGRAM(S): 400 CAPSULE ORAL at 22:53

## 2022-05-29 RX ADMIN — Medication 600 MILLIGRAM(S): at 22:50

## 2022-05-29 RX ADMIN — Medication 3 MILLILITER(S): at 20:19

## 2022-05-29 RX ADMIN — ZOLPIDEM TARTRATE 5 MILLIGRAM(S): 10 TABLET ORAL at 01:28

## 2022-05-29 RX ADMIN — Medication 600 MILLIGRAM(S): at 09:46

## 2022-05-29 NOTE — PROGRESS NOTE ADULT - ASSESSMENT
- has hMPV infection w diffuse bronchospasm; cont isolation  - bronchospasm worse today  - CT scan shows RLL infiltrate w mucoid impaction  - patient is steroid dependent; takes 10mg per day for legs  - on iv solumedrol; will need BS coverage  - albuterol/ipratropium + budesonide nebs  - has focal RLL infiltrate. On cefeime/zithromax  - dvt proph

## 2022-05-29 NOTE — PROGRESS NOTE ADULT - ASSESSMENT
#Acute Hypoxic Respiratory Failure due to hMPV with right sided infiltrate   - initiated IV abx + steroids, BDs and O2 as discussed with Dr. Tyler and Dr. Martinez    # Afib chronic on VKA with subtherapeutic INR - no bridging    # HFpEF - compensated    # DMII with DPNP - HISS, monitor on steroids, cont gabapentin    # HLD - statin    # Sinus tachycardia with albuterol    Discussed as above  All chart reviewed.  Time spent 44 min

## 2022-05-29 NOTE — PROGRESS NOTE ADULT - SUBJECTIVE AND OBJECTIVE BOX
CC: Congestion and difficulty breathing (27 May 2022 10:20)    HPI: 70 y/o F PMHx significant for Atrial fibrillation on Coumadin, CHF (HFpEF), Diabetes Mellitus Type 2, peripheral neuropathy, peripheral venous insufficiency, presents to the [facility] for further evaluation and management of c/o symptoms of sore throat, nasal congestion, cough and worsening shortness of breath over the past 4 days. Of note the patient took a home COVID-19 test, resulting negative. The patient admits to a productive cough with chest congestion. and a low grade fever today at home. In triage the patient was found to be in acute hypoxic respiratory distress => SpO2 90% on room air, conversational dyspnea with 4-5 word sentences, notable diffuse wheezing and no improvement via administration of IV steroids, or MDI inhaler. Of note the patient did attend a local family event recently.  Labs => INR 2.36, proBNP 1450, RVP (+) hMPV. CXR => Slightly prominent bronchovascular markings in the right middle versus lower lobe, without definite evidence of lobar consolidation and may be accentuated by summation density arising from the adjacent rib cage and overlying right breast shadow. If there are abnormal breath sounds on auscultation in this region, evolving pneumonia would then be considered. The remaining lungs are clear. Consider short-term follow-up including full inspiratory PA and lateral view if possible.  CT Chest => New findings in the right lower lobe and left upper lobe since June 2019, favored to be infection. 3 month follow-up is recommended.   In the ED the patient was given Methylprednisolone 125mg IVP x 1, Acetaminophen 1000mg PO x 1, Combivent nebs x 1, Albuterol MDI 2puffs x 1, and NS x 1L.   (27 May 2022 00:49)    INTERVAL HPI/OVERNIGHT EVENTS: episodes of recurreng wheezing, tachycardia with albuterol  Other ROS reviewed and neg     T(C): 36.4 (05-29-22 @ 14:15), Max: 36.7 (05-29-22 @ 06:00)  HR: 95 (05-29-22 @ 12:00) (76 - 124)  BP: 116/58 (05-29-22 @ 12:00) (109/92 - 151/101)  RR: 21 (05-29-22 @ 12:00) (18 - 26)  SpO2: 98% (05-29-22 @ 12:00) (94% - 99%)    05-28-22 @ 07:01  -  05-29-22 @ 07:00  --------------------------------------------------------  IN: 400 mL / OUT: 1300 mL / NET: -900 mL                       14.0   14.80 )-----------( 238      ( 29 May 2022 05:51 )             44.3     29 May 2022 05:51    140    |  105    |  25     ----------------------------<  148    4.1     |  29     |  0.52     Ca    8.5        29 May 2022 05:51    PT/INR - ( 29 May 2022 10:38 )   PT: 22.4 sec;   INR: 1.92 ratio      CAPILLARY BLOOD GLUCOSE      POCT Blood Glucose.: 314 mg/dL (29 May 2022 11:47)  POCT Blood Glucose.: 139 mg/dL (29 May 2022 07:57)  POCT Blood Glucose.: 223 mg/dL (28 May 2022 22:32)  POCT Blood Glucose.: 319 mg/dL (28 May 2022 18:03)    acetaminophen     Tablet .. 650 milliGRAM(s) Oral every 6 hours PRN  ALBUTerol    90 MICROgram(s) HFA Inhaler 2 Puff(s) Inhalation every 6 hours PRN  ALBUTerol    90 MICROgram(s) HFA Inhaler 2 Puff(s) Inhalation every 4 hours  albuterol/ipratropium for Nebulization 3 milliLiter(s) Nebulizer every 6 hours  azithromycin  IVPB      azithromycin  IVPB 500 milliGRAM(s) IV Intermittent every 24 hours  buDESOnide    Inhalation Suspension 0.5 milliGRAM(s) Inhalation every 12 hours  cefepime   IVPB 2000 milliGRAM(s) IV Intermittent every 12 hours  cholecalciferol 1000 Unit(s) Oral daily  cholestyramine Powder (Sugar-Free) 4 Gram(s) Oral daily  cholestyramine Powder (Sugar-Free) 4 Gram(s) Oral daily PRN  dextrose 5%. 1000 milliLiter(s) IV Continuous <Continuous>  dextrose 5%. 1000 milliLiter(s) IV Continuous <Continuous>  dextrose 50% Injectable 25 Gram(s) IV Push once  dextrose 50% Injectable 12.5 Gram(s) IV Push once  dextrose 50% Injectable 25 Gram(s) IV Push once  dextrose Oral Gel 15 Gram(s) Oral once PRN  ezetimibe 10 milliGRAM(s) Oral daily  furosemide    Tablet 80 milliGRAM(s) Oral two times a day PRN  gabapentin 800 milliGRAM(s) Oral three times a day  glucagon  Injectable 1 milliGRAM(s) IntraMuscular once  guaiFENesin  milliGRAM(s) Oral every 12 hours  insulin lispro (ADMELOG) corrective regimen sliding scale   SubCutaneous three times a day before meals  insulin lispro (ADMELOG) corrective regimen sliding scale   SubCutaneous at bedtime  methylPREDNISolone sodium succinate Injectable 40 milliGRAM(s) IV Push every 8 hours  metoprolol succinate  milliGRAM(s) Oral daily  montelukast 10 milliGRAM(s) Oral at bedtime  pravastatin 10 milliGRAM(s) Oral at bedtime  verapamil 120 milliGRAM(s) Oral two times a day  warfarin 5 milliGRAM(s) Oral daily  zolpidem 5 milliGRAM(s) Oral at bedtime PRN  zolpidem 5 milliGRAM(s) Oral at bedtime PRN      RADIOLOGY & ADDITIONAL TESTS: personally visualized    PHYSICAL EXAM:    General: obese female in mild acute respiratory distress  Eyes: PERRLA, EOMI; conjunctiva and sclera clear  Head: Normocephalic; atraumatic  ENMT: No nasal discharge; airway clear  Neck: Supple; non tender; no masses  Respiratory: + diffuse rhonchi, + end-exp wheezing  Cardiovascular: S1, S2 reg  Gastrointestinal: Soft non-tender non-distended; Normal bowel sounds  Genitourinary: No costovertebral angle tenderness  Extremities: No clubbing, cyanosis, + 1 BL LE chronic edema  Vascular: Peripheral pulses palpable 2+ bilaterally, + venous stasis  Neurological: Alert and oriented x4  Skin: Warm and dry. + venous stasis dermatosis  Musculoskeletal: Normal tone  Psychiatric: Cooperative and appropriate

## 2022-05-29 NOTE — PROGRESS NOTE ADULT - SUBJECTIVE AND OBJECTIVE BOX
Subjective:  awake, sitting in chair  audible wheezing    MEDICATIONS  (STANDING):  ALBUTerol    90 MICROgram(s) HFA Inhaler 2 Puff(s) Inhalation every 4 hours  albuterol/ipratropium for Nebulization 3 milliLiter(s) Nebulizer every 6 hours  azithromycin  IVPB      azithromycin  IVPB 500 milliGRAM(s) IV Intermittent every 24 hours  buDESOnide    Inhalation Suspension 0.5 milliGRAM(s) Inhalation every 12 hours  cefepime   IVPB 2000 milliGRAM(s) IV Intermittent every 12 hours  cholecalciferol 1000 Unit(s) Oral daily  cholestyramine Powder (Sugar-Free) 4 Gram(s) Oral daily  dextrose 5%. 1000 milliLiter(s) (100 mL/Hr) IV Continuous <Continuous>  dextrose 5%. 1000 milliLiter(s) (50 mL/Hr) IV Continuous <Continuous>  dextrose 50% Injectable 25 Gram(s) IV Push once  dextrose 50% Injectable 12.5 Gram(s) IV Push once  dextrose 50% Injectable 25 Gram(s) IV Push once  ezetimibe 10 milliGRAM(s) Oral daily  gabapentin 800 milliGRAM(s) Oral three times a day  glucagon  Injectable 1 milliGRAM(s) IntraMuscular once  guaiFENesin  milliGRAM(s) Oral every 12 hours  insulin lispro (ADMELOG) corrective regimen sliding scale   SubCutaneous three times a day before meals  insulin lispro (ADMELOG) corrective regimen sliding scale   SubCutaneous at bedtime  methylPREDNISolone sodium succinate Injectable 40 milliGRAM(s) IV Push every 8 hours  metoprolol succinate  milliGRAM(s) Oral daily  montelukast 10 milliGRAM(s) Oral at bedtime  pravastatin 10 milliGRAM(s) Oral at bedtime  verapamil 120 milliGRAM(s) Oral two times a day  warfarin 5 milliGRAM(s) Oral daily    MEDICATIONS  (PRN):  acetaminophen     Tablet .. 650 milliGRAM(s) Oral every 6 hours PRN Temp greater or equal to 38C (100.4F), Mild Pain (1 - 3)  ALBUTerol    90 MICROgram(s) HFA Inhaler 2 Puff(s) Inhalation every 6 hours PRN Bronchospasm  cholestyramine Powder (Sugar-Free) 4 Gram(s) Oral daily PRN GI upset  dextrose Oral Gel 15 Gram(s) Oral once PRN Blood Glucose LESS THAN 70 milliGRAM(s)/deciliter  furosemide    Tablet 80 milliGRAM(s) Oral two times a day PRN CHF  zolpidem 5 milliGRAM(s) Oral at bedtime PRN Insomnia  zolpidem 5 milliGRAM(s) Oral at bedtime PRN Insomnia      Allergies    penicillin (Hives)    Intolerances        REVIEW OF SYSTEMS:    CONSTITUTIONAL:  As per HPI.  HEENT:  Eyes:  No diplopia or blurred vision. ENT:  No earache, sore throat or runny nose.  CARDIOVASCULAR:  No pressure, squeezing, tightness, heaviness or aching about the chest, neck, axilla or epigastrium.  RESPIRATORY:  sob, cough  GASTROINTESTINAL:  No nausea, vomiting or diarrhea.  GENITOURINARY:  No dysuria, frequency or urgency.  MUSCULOSKELETAL:  no joint pain, deformity, tenderness  EXTREMITIES: no clubbing cyanosis,edema  SKIN:  No change in skin, hair or nails.  NEUROLOGIC:  No paresthesias, fasciculations, seizures or weakness.  PSYCHIATRIC:  No disorder of thought or mood.  ENDOCRINE:  No heat or cold intolerance, polyuria or polydipsia.  HEMATOLOGICAL:  No easy bruising or bleedings:    Vital Signs Last 24 Hrs  T(C): 36.7 (29 May 2022 06:00), Max: 36.7 (29 May 2022 06:00)  T(F): 98 (29 May 2022 06:00), Max: 98 (29 May 2022 06:00)  HR: 85 (29 May 2022 08:48) (69 - 109)  BP: 137/82 (29 May 2022 06:00) (94/69 - 148/82)  BP(mean): 95 (29 May 2022 06:00) (64 - 99)  RR: 19 (29 May 2022 06:00) (13 - 26)  SpO2: 98% (29 May 2022 06:00) (93% - 99%)    PHYSICAL EXAMINATION:  SKIN: no rashes  HEAD: NC/AT  EYES: PERRLA, EOMI  EARS: TM's intact  NOSE: no abnormalities  NECK:  Supple. No lymphadenopathy. Jugular venous pressure not elevated. Carotids equal.   HEART:  S1S2 reg  CHEST:  bilat exp ronchi  ABDOMEN:  Soft and nontender.   EXTREMITIES:  no C/C/E  NEURO: AAO x 3, no focal deficts       LABS:                        14.0   14.80 )-----------( 238      ( 29 May 2022 05:51 )             44.3     05-29    140  |  105  |  25<H>  ----------------------------<  148<H>  4.1   |  29  |  0.52    Ca    8.5      29 May 2022 05:51      PT/INR - ( 28 May 2022 05:54 )   PT: 16.9 sec;   INR: 1.45 ratio               RADIOLOGY & ADDITIONAL TESTS:

## 2022-05-30 LAB
INR BLD: 2.5 RATIO — HIGH (ref 0.88–1.16)
PROTHROM AB SERPL-ACNC: 29.3 SEC — HIGH (ref 10.5–13.4)

## 2022-05-30 PROCEDURE — 99233 SBSQ HOSP IP/OBS HIGH 50: CPT

## 2022-05-30 PROCEDURE — 99232 SBSQ HOSP IP/OBS MODERATE 35: CPT

## 2022-05-30 RX ORDER — WARFARIN SODIUM 2.5 MG/1
5 TABLET ORAL DAILY
Refills: 0 | Status: COMPLETED | OUTPATIENT
Start: 2022-05-30 | End: 2022-06-01

## 2022-05-30 RX ORDER — BENZOCAINE AND MENTHOL 5; 1 G/100ML; G/100ML
1 LIQUID ORAL EVERY 4 HOURS
Refills: 0 | Status: DISCONTINUED | OUTPATIENT
Start: 2022-05-30 | End: 2022-06-07

## 2022-05-30 RX ORDER — FUROSEMIDE 40 MG
40 TABLET ORAL DAILY
Refills: 0 | Status: DISCONTINUED | OUTPATIENT
Start: 2022-05-30 | End: 2022-06-07

## 2022-05-30 RX ADMIN — GABAPENTIN 800 MILLIGRAM(S): 400 CAPSULE ORAL at 06:32

## 2022-05-30 RX ADMIN — Medication 650 MILLIGRAM(S): at 06:34

## 2022-05-30 RX ADMIN — Medication 1000 UNIT(S): at 09:48

## 2022-05-30 RX ADMIN — Medication 40 MILLIGRAM(S): at 06:36

## 2022-05-30 RX ADMIN — GABAPENTIN 800 MILLIGRAM(S): 400 CAPSULE ORAL at 14:04

## 2022-05-30 RX ADMIN — Medication 1: at 09:46

## 2022-05-30 RX ADMIN — Medication 650 MILLIGRAM(S): at 07:00

## 2022-05-30 RX ADMIN — WARFARIN SODIUM 5 MILLIGRAM(S): 2.5 TABLET ORAL at 22:35

## 2022-05-30 RX ADMIN — Medication 10 MILLIGRAM(S): at 22:50

## 2022-05-30 RX ADMIN — Medication 1: at 23:00

## 2022-05-30 RX ADMIN — CHOLESTYRAMINE 4 GRAM(S): 4 POWDER, FOR SUSPENSION ORAL at 09:48

## 2022-05-30 RX ADMIN — Medication 4: at 12:20

## 2022-05-30 RX ADMIN — ZOLPIDEM TARTRATE 5 MILLIGRAM(S): 10 TABLET ORAL at 00:51

## 2022-05-30 RX ADMIN — Medication 120 MILLIGRAM(S): at 22:50

## 2022-05-30 RX ADMIN — Medication 0.5 MILLIGRAM(S): at 22:03

## 2022-05-30 RX ADMIN — Medication 3 MILLILITER(S): at 11:16

## 2022-05-30 RX ADMIN — MONTELUKAST 10 MILLIGRAM(S): 4 TABLET, CHEWABLE ORAL at 22:35

## 2022-05-30 RX ADMIN — BENZOCAINE AND MENTHOL 1 LOZENGE: 5; 1 LIQUID ORAL at 23:42

## 2022-05-30 RX ADMIN — Medication 600 MILLIGRAM(S): at 09:47

## 2022-05-30 RX ADMIN — EZETIMIBE 10 MILLIGRAM(S): 10 TABLET ORAL at 22:35

## 2022-05-30 RX ADMIN — Medication 3 MILLILITER(S): at 21:44

## 2022-05-30 RX ADMIN — Medication 40 MILLIGRAM(S): at 14:05

## 2022-05-30 RX ADMIN — Medication 100 MILLIGRAM(S): at 09:47

## 2022-05-30 RX ADMIN — Medication 3: at 17:38

## 2022-05-30 RX ADMIN — CEFEPIME 100 MILLIGRAM(S): 1 INJECTION, POWDER, FOR SOLUTION INTRAMUSCULAR; INTRAVENOUS at 09:48

## 2022-05-30 RX ADMIN — AZITHROMYCIN 255 MILLIGRAM(S): 500 TABLET, FILM COATED ORAL at 09:48

## 2022-05-30 RX ADMIN — Medication 40 MILLIGRAM(S): at 22:35

## 2022-05-30 RX ADMIN — Medication 600 MILLIGRAM(S): at 22:34

## 2022-05-30 RX ADMIN — Medication 120 MILLIGRAM(S): at 09:47

## 2022-05-30 RX ADMIN — Medication 0.5 MILLIGRAM(S): at 11:16

## 2022-05-30 RX ADMIN — Medication 40 MILLIGRAM(S): at 14:04

## 2022-05-30 RX ADMIN — CEFEPIME 100 MILLIGRAM(S): 1 INJECTION, POWDER, FOR SOLUTION INTRAMUSCULAR; INTRAVENOUS at 22:59

## 2022-05-30 RX ADMIN — ZOLPIDEM TARTRATE 5 MILLIGRAM(S): 10 TABLET ORAL at 23:43

## 2022-05-30 RX ADMIN — GABAPENTIN 800 MILLIGRAM(S): 400 CAPSULE ORAL at 22:34

## 2022-05-30 NOTE — PROGRESS NOTE ADULT - ASSESSMENT
#Acute Hypoxic Respiratory Failure due to hMPV with right sided infiltrate   - initiated IV abx + steroids, BDs and O2 as discussed with Dr. Tyler and Dr. Martinez    # Afib chronic on VKA - monitor INR     # HFpEF - compensated    # DMII with DPNP - HISS, monitor on steroids, cont gabapentin    # HLD - statin    # Sinus tachycardia with albuterol    Discussed as above  All chart reviewed.  Time spent 42 min

## 2022-05-30 NOTE — PROGRESS NOTE ADULT - SUBJECTIVE AND OBJECTIVE BOX
CC: Congestion and difficulty breathing (27 May 2022 10:20)    HPI: 70 y/o F PMHx significant for Atrial fibrillation on Coumadin, CHF (HFpEF), Diabetes Mellitus Type 2, peripheral neuropathy, peripheral venous insufficiency, presents to the [facility] for further evaluation and management of c/o symptoms of sore throat, nasal congestion, cough and worsening shortness of breath over the past 4 days. Of note the patient took a home COVID-19 test, resulting negative. The patient admits to a productive cough with chest congestion. and a low grade fever today at home. In triage the patient was found to be in acute hypoxic respiratory distress => SpO2 90% on room air, conversational dyspnea with 4-5 word sentences, notable diffuse wheezing and no improvement via administration of IV steroids, or MDI inhaler. Of note the patient did attend a local family event recently.  Labs => INR 2.36, proBNP 1450, RVP (+) hMPV. CXR => Slightly prominent bronchovascular markings in the right middle versus lower lobe, without definite evidence of lobar consolidation and may be accentuated by summation density arising from the adjacent rib cage and overlying right breast shadow. If there are abnormal breath sounds on auscultation in this region, evolving pneumonia would then be considered. The remaining lungs are clear. Consider short-term follow-up including full inspiratory PA and lateral view if possible.  CT Chest => New findings in the right lower lobe and left upper lobe since June 2019, favored to be infection. 3 month follow-up is recommended.   In the ED the patient was given Methylprednisolone 125mg IVP x 1, Acetaminophen 1000mg PO x 1, Combivent nebs x 1, Albuterol MDI 2puffs x 1, and NS x 1L.   (27 May 2022 00:49)    INTERVAL HPI/OVERNIGHT EVENTS: episodes of recurrent wheezing, tachycardia with albuterol - slowly improving, c/o sore throat  Other ROS reviewed and neg     T(C): 36.1 (05-30-22 @ 09:37), Max: 37 (05-29-22 @ 20:33)  HR: 103 (05-30-22 @ 10:00) (80 - 117)  BP: 164/94 (05-30-22 @ 10:00) (110/94 - 164/94)  RR: 19 (05-30-22 @ 10:00) (16 - 29)  SpO2: 91% (05-30-22 @ 10:00) (91% - 98%)    05-29-22 @ 07:01  -  05-30-22 @ 07:00  --------------------------------------------------------  IN: 0 mL / OUT: 650 mL / NET: -650 mL                       14.0   14.80 )-----------( 238      ( 29 May 2022 05:51 )             44.3     29 May 2022 05:51    140    |  105    |  25     ----------------------------<  148    4.1     |  29     |  0.52     Ca    8.5        29 May 2022 05:51    PT/INR - ( 30 May 2022 06:38 )   PT: 29.3 sec;   INR: 2.50 ratio      CAPILLARY BLOOD GLUCOSE    POCT Blood Glucose.: 314 mg/dL (30 May 2022 11:52)  POCT Blood Glucose.: 175 mg/dL (30 May 2022 08:26)  POCT Blood Glucose.: 213 mg/dL (29 May 2022 22:58)  POCT Blood Glucose.: 254 mg/dL (29 May 2022 17:19)    acetaminophen     Tablet .. 650 milliGRAM(s) Oral every 6 hours PRN  ALBUTerol    90 MICROgram(s) HFA Inhaler 2 Puff(s) Inhalation every 6 hours PRN  ALBUTerol    90 MICROgram(s) HFA Inhaler 2 Puff(s) Inhalation every 4 hours  albuterol/ipratropium for Nebulization 3 milliLiter(s) Nebulizer every 6 hours  azithromycin  IVPB      azithromycin  IVPB 500 milliGRAM(s) IV Intermittent every 24 hours  benzocaine 15 mG/menthol 3.6 mG Lozenge 1 Lozenge Oral every 4 hours PRN  buDESOnide    Inhalation Suspension 0.5 milliGRAM(s) Inhalation every 12 hours  cefepime   IVPB 2000 milliGRAM(s) IV Intermittent every 12 hours  cholecalciferol 1000 Unit(s) Oral daily  cholestyramine Powder (Sugar-Free) 4 Gram(s) Oral daily  cholestyramine Powder (Sugar-Free) 4 Gram(s) Oral daily PRN  dextrose 5%. 1000 milliLiter(s) IV Continuous <Continuous>  dextrose 5%. 1000 milliLiter(s) IV Continuous <Continuous>  dextrose 50% Injectable 25 Gram(s) IV Push once  dextrose 50% Injectable 12.5 Gram(s) IV Push once  dextrose 50% Injectable 25 Gram(s) IV Push once  dextrose Oral Gel 15 Gram(s) Oral once PRN  ezetimibe 10 milliGRAM(s) Oral daily  furosemide    Tablet 40 milliGRAM(s) Oral daily  gabapentin 800 milliGRAM(s) Oral three times a day  glucagon  Injectable 1 milliGRAM(s) IntraMuscular once  guaiFENesin  milliGRAM(s) Oral every 12 hours  insulin lispro (ADMELOG) corrective regimen sliding scale   SubCutaneous three times a day before meals  insulin lispro (ADMELOG) corrective regimen sliding scale   SubCutaneous at bedtime  methylPREDNISolone sodium succinate Injectable 40 milliGRAM(s) IV Push every 8 hours  metoprolol succinate  milliGRAM(s) Oral daily  montelukast 10 milliGRAM(s) Oral at bedtime  pravastatin 10 milliGRAM(s) Oral at bedtime  verapamil 120 milliGRAM(s) Oral two times a day  warfarin 5 milliGRAM(s) Oral daily  zolpidem 5 milliGRAM(s) Oral at bedtime PRN  zolpidem 5 milliGRAM(s) Oral at bedtime PRN    RADIOLOGY & ADDITIONAL TESTS: personally visualized    PHYSICAL EXAM:    General: obese female in no acute respiratory distress  Eyes: PERRLA, EOMI; conjunctiva and sclera clear  Head: Normocephalic; atraumatic  ENMT: No nasal discharge; airway clear  Neck: Supple; non tender; no masses  Respiratory: + diffuse rhonchi  Cardiovascular: S1, S2 reg  Gastrointestinal: Soft non-tender non-distended; Normal bowel sounds  Genitourinary: No costovertebral angle tenderness  Extremities: No clubbing, cyanosis, + 1 BL LE chronic edema  Vascular: Peripheral pulses palpable 2+ bilaterally, + venous stasis  Neurological: Alert and oriented x4  Skin: Warm and dry. + venous stasis dermatosis  Musculoskeletal: Normal tone  Psychiatric: Cooperative and appropriate

## 2022-05-30 NOTE — PROGRESS NOTE ADULT - SUBJECTIVE AND OBJECTIVE BOX
Subjective:    Awake, alert. Non-productive cough.    MEDICATIONS  (STANDING):  ALBUTerol    90 MICROgram(s) HFA Inhaler 2 Puff(s) Inhalation every 4 hours  albuterol/ipratropium for Nebulization 3 milliLiter(s) Nebulizer every 6 hours  azithromycin  IVPB      azithromycin  IVPB 500 milliGRAM(s) IV Intermittent every 24 hours  buDESOnide    Inhalation Suspension 0.5 milliGRAM(s) Inhalation every 12 hours  cefepime   IVPB 2000 milliGRAM(s) IV Intermittent every 12 hours  cholecalciferol 1000 Unit(s) Oral daily  cholestyramine Powder (Sugar-Free) 4 Gram(s) Oral daily  dextrose 5%. 1000 milliLiter(s) (100 mL/Hr) IV Continuous <Continuous>  dextrose 5%. 1000 milliLiter(s) (50 mL/Hr) IV Continuous <Continuous>  dextrose 50% Injectable 25 Gram(s) IV Push once  dextrose 50% Injectable 12.5 Gram(s) IV Push once  dextrose 50% Injectable 25 Gram(s) IV Push once  ezetimibe 10 milliGRAM(s) Oral daily  gabapentin 800 milliGRAM(s) Oral three times a day  glucagon  Injectable 1 milliGRAM(s) IntraMuscular once  guaiFENesin  milliGRAM(s) Oral every 12 hours  insulin lispro (ADMELOG) corrective regimen sliding scale   SubCutaneous three times a day before meals  insulin lispro (ADMELOG) corrective regimen sliding scale   SubCutaneous at bedtime  methylPREDNISolone sodium succinate Injectable 40 milliGRAM(s) IV Push every 8 hours  metoprolol succinate  milliGRAM(s) Oral daily  montelukast 10 milliGRAM(s) Oral at bedtime  pravastatin 10 milliGRAM(s) Oral at bedtime  verapamil 120 milliGRAM(s) Oral two times a day    MEDICATIONS  (PRN):  acetaminophen     Tablet .. 650 milliGRAM(s) Oral every 6 hours PRN Temp greater or equal to 38C (100.4F), Mild Pain (1 - 3)  ALBUTerol    90 MICROgram(s) HFA Inhaler 2 Puff(s) Inhalation every 6 hours PRN Bronchospasm  cholestyramine Powder (Sugar-Free) 4 Gram(s) Oral daily PRN GI upset  dextrose Oral Gel 15 Gram(s) Oral once PRN Blood Glucose LESS THAN 70 milliGRAM(s)/deciliter  furosemide    Tablet 80 milliGRAM(s) Oral two times a day PRN CHF  zolpidem 5 milliGRAM(s) Oral at bedtime PRN Insomnia  zolpidem 5 milliGRAM(s) Oral at bedtime PRN Insomnia      Allergies    penicillin (Hives)    Intolerances        Vital Signs Last 24 Hrs  T(C): 37 (29 May 2022 20:33), Max: 37 (29 May 2022 20:33)  T(F): 98.6 (29 May 2022 20:33), Max: 98.6 (29 May 2022 20:33)  HR: 98 (30 May 2022 00:00) (76 - 124)  BP: 110/94 (29 May 2022 21:50) (110/94 - 151/101)  BP(mean): 101 (29 May 2022 21:50) (77 - 115)  RR: 24 (30 May 2022 00:00) (17 - 29)  SpO2: 94% (30 May 2022 00:00) (93% - 98%)    PHYSICAL EXAMINATION:    NECK:  Supple. No lymphadenopathy. Jugular venous pressure not elevated.    HEART:   The cardiac impulse has a normal quality. Reg., Nl S1 and S2.    CHEST:  Chest diffuse insp/exp wheezes with prolonged exp. phase. Normal respiratory effort.  ABDOMEN:  Soft and nontender.   EXTREMITIES:  There is no edema.       LABS:                        14.0   14.80 )-----------( 238      ( 29 May 2022 05:51 )             44.3     05-29    140  |  105  |  25<H>  ----------------------------<  148<H>  4.1   |  29  |  0.52    Ca    8.5      29 May 2022 05:51      PT/INR - ( 29 May 2022 10:38 )   PT: 22.4 sec;   INR: 1.92 ratio               RADIOLOGY & ADDITIONAL TESTS: < from: CT Chest No Cont (05.26.22 @ 15:38) >  ACC: [id number] EXAM:  CT CHEST                          PROCEDURE DATE:  05/26/2022          INTERPRETATION:  INDICATION: Congestion and difficulty breathing    TECHNIQUE: Volumetric images of the chest without intravenous contrast.   Maximum intensity projection images were generated.    COMPARISON: CT chest 6/9/2019.    FINDINGS:    LUNGS/AIRWAYS/PLEURA: New consolidation, adjacent groundglass, and   clustered nodules in the right lower lobe. New few small nodules in the   apicoposterior left upper lobe. Patent airways to the subsegmental   bronchi. Unremarkable pleura.    LYMPH NODES/MEDIASTINUM: Small hiatal hernia. No enlarged lymph nodes.   Multiple subcentimeter thyroid nodules.    HEART/VASCULATURE: Normal heart size. Unremarkable pericardium. Normal   caliber aorta and pulmonary artery. Coronary artery and aortic   calcifications. Calcified aortic valve suggesting aortic stenosis.    UPPER ABDOMEN: Unremarkable.    BONES/SOFT TISSUES: Degenerative changes of the spine.      IMPRESSION:    New findings in the right lower lobe and left upper lobe since June 2019,   favored to be infection. 3 month follow-up is recommended.        KALI GARCIA M.D.,            Assessment and Recommendation:   · Assessment  	  - has hMPV infection w diffuse bronchospasm; cont isolation  - CT scan shows RLL infiltrate w mucoid impaction  - Cont. solumedrol @ 40MG Q8H; will need BS coverage  - albuterol/ipratropium + budesonide nebs  - has focal RLL infiltrate. Rx as per ID  - dvt proph    Problem/Recommendation - 1:  ·  Infection due to human metapneumovirus (hMPV).   Problem/Recommendation - 2:  ·  Pneumonia due to human metapneumovirus.   Problem/Recommendation - 3:  ·  Acute bronchospasm.   Problem/Recommendation - 4:  ·  Persistent cough.   Problem/Recommendation - 5:  ·  Bronchitis with acute wheezing.

## 2022-05-31 LAB
ANION GAP SERPL CALC-SCNC: 8 MMOL/L — SIGNIFICANT CHANGE UP (ref 5–17)
BUN SERPL-MCNC: 33 MG/DL — HIGH (ref 7–23)
CALCIUM SERPL-MCNC: 8.7 MG/DL — SIGNIFICANT CHANGE UP (ref 8.5–10.1)
CHLORIDE SERPL-SCNC: 102 MMOL/L — SIGNIFICANT CHANGE UP (ref 96–108)
CO2 SERPL-SCNC: 27 MMOL/L — SIGNIFICANT CHANGE UP (ref 22–31)
CREAT SERPL-MCNC: 0.77 MG/DL — SIGNIFICANT CHANGE UP (ref 0.5–1.3)
CULTURE RESULTS: SIGNIFICANT CHANGE UP
CULTURE RESULTS: SIGNIFICANT CHANGE UP
EGFR: 82 ML/MIN/1.73M2 — SIGNIFICANT CHANGE UP
GLUCOSE SERPL-MCNC: 220 MG/DL — HIGH (ref 70–99)
HCT VFR BLD CALC: 44.9 % — SIGNIFICANT CHANGE UP (ref 34.5–45)
HGB BLD-MCNC: 14.8 G/DL — SIGNIFICANT CHANGE UP (ref 11.5–15.5)
INR BLD: 3.1 RATIO — HIGH (ref 0.88–1.16)
MCHC RBC-ENTMCNC: 31.2 PG — SIGNIFICANT CHANGE UP (ref 27–34)
MCHC RBC-ENTMCNC: 33 GM/DL — SIGNIFICANT CHANGE UP (ref 32–36)
MCV RBC AUTO: 94.5 FL — SIGNIFICANT CHANGE UP (ref 80–100)
PLATELET # BLD AUTO: 301 K/UL — SIGNIFICANT CHANGE UP (ref 150–400)
POTASSIUM SERPL-MCNC: 4 MMOL/L — SIGNIFICANT CHANGE UP (ref 3.5–5.3)
POTASSIUM SERPL-SCNC: 4 MMOL/L — SIGNIFICANT CHANGE UP (ref 3.5–5.3)
PROTHROM AB SERPL-ACNC: 36.4 SEC — HIGH (ref 10.5–13.4)
RBC # BLD: 4.75 M/UL — SIGNIFICANT CHANGE UP (ref 3.8–5.2)
RBC # FLD: 13.2 % — SIGNIFICANT CHANGE UP (ref 10.3–14.5)
SODIUM SERPL-SCNC: 137 MMOL/L — SIGNIFICANT CHANGE UP (ref 135–145)
SPECIMEN SOURCE: SIGNIFICANT CHANGE UP
SPECIMEN SOURCE: SIGNIFICANT CHANGE UP
WBC # BLD: 12.6 K/UL — HIGH (ref 3.8–10.5)
WBC # FLD AUTO: 12.6 K/UL — HIGH (ref 3.8–10.5)

## 2022-05-31 PROCEDURE — 99232 SBSQ HOSP IP/OBS MODERATE 35: CPT

## 2022-05-31 RX ADMIN — Medication 10 MILLIGRAM(S): at 21:55

## 2022-05-31 RX ADMIN — Medication 40 MILLIGRAM(S): at 10:35

## 2022-05-31 RX ADMIN — GABAPENTIN 800 MILLIGRAM(S): 400 CAPSULE ORAL at 06:43

## 2022-05-31 RX ADMIN — EZETIMIBE 10 MILLIGRAM(S): 10 TABLET ORAL at 21:58

## 2022-05-31 RX ADMIN — Medication 1: at 08:19

## 2022-05-31 RX ADMIN — CEFEPIME 100 MILLIGRAM(S): 1 INJECTION, POWDER, FOR SOLUTION INTRAMUSCULAR; INTRAVENOUS at 21:55

## 2022-05-31 RX ADMIN — Medication 1: at 21:56

## 2022-05-31 RX ADMIN — AZITHROMYCIN 255 MILLIGRAM(S): 500 TABLET, FILM COATED ORAL at 10:36

## 2022-05-31 RX ADMIN — Medication 40 MILLIGRAM(S): at 21:55

## 2022-05-31 RX ADMIN — Medication 120 MILLIGRAM(S): at 21:56

## 2022-05-31 RX ADMIN — MONTELUKAST 10 MILLIGRAM(S): 4 TABLET, CHEWABLE ORAL at 21:55

## 2022-05-31 RX ADMIN — Medication 600 MILLIGRAM(S): at 21:55

## 2022-05-31 RX ADMIN — GABAPENTIN 800 MILLIGRAM(S): 400 CAPSULE ORAL at 21:55

## 2022-05-31 RX ADMIN — Medication 3 MILLILITER(S): at 09:27

## 2022-05-31 RX ADMIN — Medication 40 MILLIGRAM(S): at 06:43

## 2022-05-31 RX ADMIN — ZOLPIDEM TARTRATE 5 MILLIGRAM(S): 10 TABLET ORAL at 00:58

## 2022-05-31 RX ADMIN — GABAPENTIN 800 MILLIGRAM(S): 400 CAPSULE ORAL at 14:17

## 2022-05-31 RX ADMIN — Medication 650 MILLIGRAM(S): at 21:57

## 2022-05-31 RX ADMIN — Medication 3 MILLILITER(S): at 15:31

## 2022-05-31 RX ADMIN — CEFEPIME 100 MILLIGRAM(S): 1 INJECTION, POWDER, FOR SOLUTION INTRAMUSCULAR; INTRAVENOUS at 10:36

## 2022-05-31 RX ADMIN — Medication 3: at 17:25

## 2022-05-31 RX ADMIN — Medication 3 MILLILITER(S): at 21:00

## 2022-05-31 RX ADMIN — Medication 100 MILLIGRAM(S): at 10:39

## 2022-05-31 RX ADMIN — Medication 600 MILLIGRAM(S): at 10:35

## 2022-05-31 RX ADMIN — Medication 3: at 12:19

## 2022-05-31 RX ADMIN — Medication 650 MILLIGRAM(S): at 22:30

## 2022-05-31 RX ADMIN — CHOLESTYRAMINE 4 GRAM(S): 4 POWDER, FOR SUSPENSION ORAL at 08:22

## 2022-05-31 RX ADMIN — Medication 1000 UNIT(S): at 10:34

## 2022-05-31 RX ADMIN — BENZOCAINE AND MENTHOL 1 LOZENGE: 5; 1 LIQUID ORAL at 06:43

## 2022-05-31 RX ADMIN — Medication 120 MILLIGRAM(S): at 10:39

## 2022-05-31 NOTE — PROGRESS NOTE ADULT - SUBJECTIVE AND OBJECTIVE BOX
Chief Complaint: congestion, SOB, cough       Interval events:   - No acute events overnight, VSS with O2 93-95% on 2L NC  - Pt reports mild improvement in SOB/cough, weaning solumedrol to BID today   - INR 3.1, will hold evening Warfarin    ROS:   Patient denies any current chest pain, f/c/n/v/d, abd pain, myalgias, dysuria, HA, dizziness  All other review of systems is negative unless indicated above    Physical Exam:  Vital Signs Last 24 Hrs  T(C): 36.4 (31 May 2022 08:19), Max: 36.4 (30 May 2022 15:01)  T(F): 97.6 (31 May 2022 08:19), Max: 97.6 (30 May 2022 15:01)  HR: 88 (31 May 2022 08:19) (82 - 102)  BP: 150/89 (31 May 2022 08:19) (115/90 - 154/100)  BP(mean): 99 (30 May 2022 14:00) (99 - 99)  RR: 18 (31 May 2022 08:19) (18 - 22)  SpO2: 93% (31 May 2022 08:19) (93% - 98%)    Constitutional: NAD, awake and alert, obese female  HEENT: PERRLA, EOMI, MMM  Respiratory: mild wheezing b/l  Cardiovascular: S1 and S2, RRR, no murmurs, gallops or rubs  Gastrointestinal: +BS, soft, non-tender, non-distended, no CVA tenderness  Extremities: 1+ pitting edema b/l LEs, +DP pulses b/l  Neurological: A&O x 3, no focal deficits  Musculoskeletal: 5/5 strength b/l upper and lower extremities  Skin: LE chronic venous stasis changes, skin warm and dry    Labs:  Glucose 220 mg/dL  HCO3 27 mmol/L  Chloride 102 mmol/L  Sodium 137 mmol/L>   Potassium 4.0 mmol/L  Creatinine 0.77 mg/dL  Calcium 8.7 mg/dL  BUN 33 mg/dL  eGFR 82 mL/min/1.73m2  Anion gap 8 mmol/L    WBC 12.60  Hemoglobin 14.8  Hemoatocrit 44.9  Platelet count 301      PT/INR - ( 31 May 2022 08:44 )   PT: 36.4 sec;   INR: 3.10 ratio  (last 2.5)    Micro:  BCx 5/26 -- NGTD  Respiratory viral panel 5/26 -- positive hMPV    Radiology:  Xray Chest 1 View- PORTABLE-Urgent (05.26.22)  IMPRESSION: Slightly prominent bronchovascular markings in the right   middle versus lower lobe, without definite evidence of lobar   consolidation and may be accentuated by summation density arising from   the adjacent rib cage and overlying right breast shadow. If there are   abnormal breath sounds on auscultation in this region, evolving pneumonia   would then be considered. The remaining lungs are clear.    CT Chest No Cont (05.26.22)  IMPRESSION:    New findings in the right lower lobe and left upper lobe since June 2019,   favored to be infection. 3 month follow-up is recommended.      Medications:  MEDICATIONS  (STANDING):  ALBUTerol    90 MICROgram(s) HFA Inhaler 2 Puff(s) Inhalation every 4 hours  albuterol/ipratropium for Nebulization 3 milliLiter(s) Nebulizer every 6 hours  azithromycin  IVPB      azithromycin  IVPB 500 milliGRAM(s) IV Intermittent every 24 hours  buDESOnide    Inhalation Suspension 0.5 milliGRAM(s) Inhalation every 12 hours  cefepime   IVPB 2000 milliGRAM(s) IV Intermittent every 12 hours  cholecalciferol 1000 Unit(s) Oral daily  cholestyramine Powder (Sugar-Free) 4 Gram(s) Oral daily  dextrose 5%. 1000 milliLiter(s) (100 mL/Hr) IV Continuous <Continuous>  dextrose 5%. 1000 milliLiter(s) (50 mL/Hr) IV Continuous <Continuous>  dextrose 50% Injectable 25 Gram(s) IV Push once  dextrose 50% Injectable 12.5 Gram(s) IV Push once  dextrose 50% Injectable 25 Gram(s) IV Push once  ezetimibe 10 milliGRAM(s) Oral daily  furosemide    Tablet 40 milliGRAM(s) Oral daily  gabapentin 800 milliGRAM(s) Oral three times a day  glucagon  Injectable 1 milliGRAM(s) IntraMuscular once  guaiFENesin  milliGRAM(s) Oral every 12 hours  insulin lispro (ADMELOG) corrective regimen sliding scale   SubCutaneous three times a day before meals  insulin lispro (ADMELOG) corrective regimen sliding scale   SubCutaneous at bedtime  methylPREDNISolone sodium succinate Injectable 40 milliGRAM(s) IV Push two times a day  metoprolol succinate  milliGRAM(s) Oral daily  montelukast 10 milliGRAM(s) Oral at bedtime  pravastatin 10 milliGRAM(s) Oral at bedtime  verapamil 120 milliGRAM(s) Oral two times a day  warfarin 5 milliGRAM(s) Oral daily    MEDICATIONS  (PRN):  acetaminophen     Tablet .. 650 milliGRAM(s) Oral every 6 hours PRN Temp greater or equal to 38C (100.4F), Mild Pain (1 - 3)  ALBUTerol    90 MICROgram(s) HFA Inhaler 2 Puff(s) Inhalation every 6 hours PRN Bronchospasm  benzocaine 15 mG/menthol 3.6 mG Lozenge 1 Lozenge Oral every 4 hours PRN Sore Throat  cholestyramine Powder (Sugar-Free) 4 Gram(s) Oral daily PRN GI upset  dextrose Oral Gel 15 Gram(s) Oral once PRN Blood Glucose LESS THAN 70 milliGRAM(s)/deciliter  zolpidem 5 milliGRAM(s) Oral at bedtime PRN Insomnia  zolpidem 5 milliGRAM(s) Oral at bedtime PRN Insomnia

## 2022-05-31 NOTE — PROGRESS NOTE ADULT - ASSESSMENT
70 y/o F with a PMHx significant for Atrial fibrillation on Coumadin, CHF (HFpEF), DM2 with peripheral neuropathy, peripheral venous insufficieny, morbid obesity, HTN, HLD presented to the [facility] for further evaluation and management of c/o symptoms of sore throat, nasal congestion, cough and worsening shortness of breath over the past 4 days. Found to have acute hypoxic respiratory failure d/t hMPV with concurrent RLL PNA, bronchitis.     #Acute hypoxic respiratory failure 2/2 hMPV, RLL PNA  #Bronchitis   - found to have hMPV on 5/26, isolation precautions   - CT chest noting new RLL consolidation   - Pulmonary following, appreciate recs  - C/w Azithro + Cefepime  - C/w Solumedrol 40mg BID (decreased today from TID)  - Continue albuterol/ipratropium + budesonide nebs  - albuterol standing q4  - mucinex BID, singular qHS  - supplemental O2 -- 2L NC     #Afib on warfarin   - On warfarin 5mg daily  - INR 3.1 today, will hold evening dose of warfarin  - continue to monitor INR  - continue verapamil, metoprolol      #DM2  - A1C 5.8   - Elevated blood glucose iso IV solumedrol   - Continue BRENDA  - continue to monitor while decreasing steroids   - c/w gabapentin 800mg TID    #PVD with chronic venous stasis skin changes  - continue PO lasix 40mg daily    #HTN   - continue verapamil, metoprolol      #HLD  - Ezetimibe, pravastatin daily    #Hx of cholecystectomy   - c/w cholestyramine     #DVT ppx   - On Warfarin     #Diet   - consistent carb     #Code  - Full    #Dispo  - Pending further clinical improvement

## 2022-06-01 LAB
ANION GAP SERPL CALC-SCNC: 9 MMOL/L — SIGNIFICANT CHANGE UP (ref 5–17)
BUN SERPL-MCNC: 35 MG/DL — HIGH (ref 7–23)
CALCIUM SERPL-MCNC: 8.7 MG/DL — SIGNIFICANT CHANGE UP (ref 8.5–10.1)
CHLORIDE SERPL-SCNC: 104 MMOL/L — SIGNIFICANT CHANGE UP (ref 96–108)
CO2 SERPL-SCNC: 25 MMOL/L — SIGNIFICANT CHANGE UP (ref 22–31)
CREAT SERPL-MCNC: 0.7 MG/DL — SIGNIFICANT CHANGE UP (ref 0.5–1.3)
EGFR: 91 ML/MIN/1.73M2 — SIGNIFICANT CHANGE UP
GLUCOSE SERPL-MCNC: 205 MG/DL — HIGH (ref 70–99)
INR BLD: 3.18 RATIO — HIGH (ref 0.88–1.16)
POTASSIUM SERPL-MCNC: 4.2 MMOL/L — SIGNIFICANT CHANGE UP (ref 3.5–5.3)
POTASSIUM SERPL-SCNC: 4.2 MMOL/L — SIGNIFICANT CHANGE UP (ref 3.5–5.3)
PROTHROM AB SERPL-ACNC: 37.3 SEC — HIGH (ref 10.5–13.4)
SARS-COV-2 RNA SPEC QL NAA+PROBE: SIGNIFICANT CHANGE UP
SODIUM SERPL-SCNC: 138 MMOL/L — SIGNIFICANT CHANGE UP (ref 135–145)

## 2022-06-01 PROCEDURE — 99233 SBSQ HOSP IP/OBS HIGH 50: CPT

## 2022-06-01 RX ADMIN — Medication 120 MILLIGRAM(S): at 21:56

## 2022-06-01 RX ADMIN — ZOLPIDEM TARTRATE 5 MILLIGRAM(S): 10 TABLET ORAL at 02:21

## 2022-06-01 RX ADMIN — EZETIMIBE 10 MILLIGRAM(S): 10 TABLET ORAL at 21:55

## 2022-06-01 RX ADMIN — Medication 100 MILLIGRAM(S): at 09:11

## 2022-06-01 RX ADMIN — Medication 3 MILLILITER(S): at 02:41

## 2022-06-01 RX ADMIN — CEFEPIME 100 MILLIGRAM(S): 1 INJECTION, POWDER, FOR SOLUTION INTRAMUSCULAR; INTRAVENOUS at 09:11

## 2022-06-01 RX ADMIN — Medication 4: at 17:07

## 2022-06-01 RX ADMIN — Medication 0.5 MILLIGRAM(S): at 09:15

## 2022-06-01 RX ADMIN — Medication 650 MILLIGRAM(S): at 22:30

## 2022-06-01 RX ADMIN — Medication 600 MILLIGRAM(S): at 09:11

## 2022-06-01 RX ADMIN — GABAPENTIN 800 MILLIGRAM(S): 400 CAPSULE ORAL at 05:55

## 2022-06-01 RX ADMIN — Medication 1: at 07:54

## 2022-06-01 RX ADMIN — Medication 1: at 21:56

## 2022-06-01 RX ADMIN — Medication 40 MILLIGRAM(S): at 09:11

## 2022-06-01 RX ADMIN — Medication 3 MILLILITER(S): at 21:17

## 2022-06-01 RX ADMIN — Medication 1000 UNIT(S): at 09:11

## 2022-06-01 RX ADMIN — MONTELUKAST 10 MILLIGRAM(S): 4 TABLET, CHEWABLE ORAL at 21:55

## 2022-06-01 RX ADMIN — Medication 0.5 MILLIGRAM(S): at 21:27

## 2022-06-01 RX ADMIN — Medication 650 MILLIGRAM(S): at 21:56

## 2022-06-01 RX ADMIN — CEFEPIME 100 MILLIGRAM(S): 1 INJECTION, POWDER, FOR SOLUTION INTRAMUSCULAR; INTRAVENOUS at 21:54

## 2022-06-01 RX ADMIN — Medication 3 MILLILITER(S): at 14:03

## 2022-06-01 RX ADMIN — Medication 120 MILLIGRAM(S): at 09:11

## 2022-06-01 RX ADMIN — Medication 3 MILLILITER(S): at 09:15

## 2022-06-01 RX ADMIN — GABAPENTIN 800 MILLIGRAM(S): 400 CAPSULE ORAL at 21:55

## 2022-06-01 RX ADMIN — Medication 10 MILLIGRAM(S): at 21:55

## 2022-06-01 RX ADMIN — Medication 600 MILLIGRAM(S): at 21:55

## 2022-06-01 RX ADMIN — GABAPENTIN 800 MILLIGRAM(S): 400 CAPSULE ORAL at 13:03

## 2022-06-01 RX ADMIN — Medication 40 MILLIGRAM(S): at 21:54

## 2022-06-01 RX ADMIN — CHOLESTYRAMINE 4 GRAM(S): 4 POWDER, FOR SUSPENSION ORAL at 08:03

## 2022-06-01 NOTE — PROGRESS NOTE ADULT - SUBJECTIVE AND OBJECTIVE BOX
Subjective:  still sob and wheezy    MEDICATIONS  (STANDING):  ALBUTerol    90 MICROgram(s) HFA Inhaler 2 Puff(s) Inhalation every 4 hours  albuterol/ipratropium for Nebulization 3 milliLiter(s) Nebulizer every 6 hours  azithromycin  IVPB 500 milliGRAM(s) IV Intermittent every 24 hours  azithromycin  IVPB      buDESOnide    Inhalation Suspension 0.5 milliGRAM(s) Inhalation every 12 hours  cefepime   IVPB 2000 milliGRAM(s) IV Intermittent every 12 hours  cholecalciferol 1000 Unit(s) Oral daily  cholestyramine Powder (Sugar-Free) 4 Gram(s) Oral daily  dextrose 5%. 1000 milliLiter(s) (100 mL/Hr) IV Continuous <Continuous>  dextrose 5%. 1000 milliLiter(s) (50 mL/Hr) IV Continuous <Continuous>  dextrose 50% Injectable 25 Gram(s) IV Push once  dextrose 50% Injectable 12.5 Gram(s) IV Push once  dextrose 50% Injectable 25 Gram(s) IV Push once  ezetimibe 10 milliGRAM(s) Oral daily  furosemide    Tablet 40 milliGRAM(s) Oral daily  gabapentin 800 milliGRAM(s) Oral three times a day  glucagon  Injectable 1 milliGRAM(s) IntraMuscular once  guaiFENesin  milliGRAM(s) Oral every 12 hours  insulin lispro (ADMELOG) corrective regimen sliding scale   SubCutaneous three times a day before meals  insulin lispro (ADMELOG) corrective regimen sliding scale   SubCutaneous at bedtime  methylPREDNISolone sodium succinate Injectable 40 milliGRAM(s) IV Push two times a day  metoprolol succinate  milliGRAM(s) Oral daily  montelukast 10 milliGRAM(s) Oral at bedtime  pravastatin 10 milliGRAM(s) Oral at bedtime  verapamil 120 milliGRAM(s) Oral two times a day  warfarin 5 milliGRAM(s) Oral daily    MEDICATIONS  (PRN):  acetaminophen     Tablet .. 650 milliGRAM(s) Oral every 6 hours PRN Temp greater or equal to 38C (100.4F), Mild Pain (1 - 3)  ALBUTerol    90 MICROgram(s) HFA Inhaler 2 Puff(s) Inhalation every 6 hours PRN Bronchospasm  benzocaine 15 mG/menthol 3.6 mG Lozenge 1 Lozenge Oral every 4 hours PRN Sore Throat  cholestyramine Powder (Sugar-Free) 4 Gram(s) Oral daily PRN GI upset  dextrose Oral Gel 15 Gram(s) Oral once PRN Blood Glucose LESS THAN 70 milliGRAM(s)/deciliter  zolpidem 5 milliGRAM(s) Oral at bedtime PRN Insomnia  zolpidem 5 milliGRAM(s) Oral at bedtime PRN Insomnia      Allergies    penicillin (Hives)    Intolerances        REVIEW OF SYSTEMS:    CONSTITUTIONAL:  As per HPI.  HEENT:  Eyes:  No diplopia or blurred vision. ENT:  No earache, sore throat or runny nose.  CARDIOVASCULAR:  No pressure, squeezing, tightness, heaviness or aching about the chest, neck, axilla or epigastrium.  RESPIRATORY:  No cough, shortness of breath, PND or orthopnea.  GASTROINTESTINAL:  No nausea, vomiting or diarrhea.  GENITOURINARY:  No dysuria, frequency or urgency.  MUSCULOSKELETAL:  no joint pain, deformity, tenderness  EXTREMITIES: no clubbing cyanosis,edema  SKIN:  No change in skin, hair or nails.  NEUROLOGIC:  No paresthesias, fasciculations, seizures or weakness.  PSYCHIATRIC:  No disorder of thought or mood.  ENDOCRINE:  No heat or cold intolerance, polyuria or polydipsia.  HEMATOLOGICAL:  No easy bruising or bleedings:    Vital Signs Last 24 Hrs  T(C): 36.6 (01 Jun 2022 08:31), Max: 36.6 (31 May 2022 23:14)  T(F): 97.8 (01 Jun 2022 08:31), Max: 97.8 (31 May 2022 23:14)  HR: 88 (01 Jun 2022 08:31) (88 - 102)  BP: 152/92 (01 Jun 2022 08:31) (125/75 - 152/92)  BP(mean): --  RR: 18 (01 Jun 2022 08:31) (18 - 19)  SpO2: 96% (01 Jun 2022 08:31) (94% - 96%)    PHYSICAL EXAMINATION:  SKIN: no rashes  HEAD: NC/AT  EYES: PERRLA, EOMI  EARS: TM's intact  NOSE: no abnormalities  NECK:  Supple. No lymphadenopathy. Jugular venous pressure not elevated. Carotids equal.   HEART:  S1S2 reg  CHEST:  bilat exp ronchi  ABDOMEN:  Soft and nontender.   EXTREMITIES:  no C/C/E  NEURO: AAO x 3, no focal deficts       LABS:                        14.8   12.60 )-----------( 301      ( 31 May 2022 08:44 )             44.9     05-31    137  |  102  |  33<H>  ----------------------------<  220<H>  4.0   |  27  |  0.77    Ca    8.7      31 May 2022 08:44      PT/INR - ( 31 May 2022 08:44 )   PT: 36.4 sec;   INR: 3.10 ratio               RADIOLOGY & ADDITIONAL TESTS:

## 2022-06-01 NOTE — PHYSICAL THERAPY INITIAL EVALUATION ADULT - PLANNED THERAPY INTERVENTIONS, PT EVAL
Eval, amb, transfers, bed mobility x 15'. Pt left OOB in chair with all observation section equipment/material intact. Nursing informed of pt OOB in chair with no chair alarm. Pt instructed to not get up on her own and to utilize CB if she needs to get OOC. Pt with no c/o pain. Will cont to follow pt and increase as tolerated./bed mobility training/gait training/transfer training

## 2022-06-01 NOTE — PHYSICAL THERAPY INITIAL EVALUATION ADULT - GENERAL OBSERVATIONS, REHAB EVAL
Pt found supine in bed with IV, and bed alarm activated. Noted discoloration bilateral lower legs and min-mod edema bilateral lower legs to feet.

## 2022-06-01 NOTE — PROGRESS NOTE ADULT - SUBJECTIVE AND OBJECTIVE BOX
Chief Complaint: congestion, SOB, cough      Interval events:   - No acute events overnight, VSS with O2 96% on 2L NC  - Pt reports feeling slightly better today, eager to get home  - INR remains supratherapeutic at 3.18    ROS:   Patient denies any current chest pain, f/c/n/v/d, abd pain, LE edema, myalgias, dysuria, HA, dizziness  All other review of systems is negative unless indicated above    Physical Exam:  Vital Signs Last 24 Hrs  T(C): 36.6 (01 Jun 2022 08:31), Max: 36.6 (31 May 2022 23:14)  T(F): 97.8 (01 Jun 2022 08:31), Max: 97.8 (31 May 2022 23:14)  HR: 98 (01 Jun 2022 09:54) (86 - 102)  BP: 152/88 (01 Jun 2022 09:54) (125/75 - 152/92)  BP(mean): --  RR: 18 (01 Jun 2022 08:31) (18 - 19)  SpO2: 97% (01 Jun 2022 09:48) (94% - 97%)    Constitutional: NAD, awake and alert, obese female  HEENT: PERRLA, EOMI, MMM  Respiratory: +rhonchi bilaterally   Cardiovascular: S1 and S2, RRR, no murmurs, gallops or rubs  Gastrointestinal: +BS, soft, non-tender, non-distended, no CVA tenderness  Extremities: 1+ pitting edema b/l LEs, +DP pulses b/l  Neurological: A&O x 3, no focal deficits  Musculoskeletal: 5/5 strength b/l upper and lower extremities  Skin: LE chronic venous stasis changes, skin warm and dry    Labs:  Glucose 205 mg/dL  HCO3 25 mmol/L  Chloride 104 mmol/L  Sodium 138 mmol/L>   Potassium 4.2 mmol/L  Creatinine 0.70 mg/dL  Calcium 8.7 mg/dL  BUN 35 mg/dL  eGFR 91 mL/min/1.73m2  Anion gap 9 mmol/L    PT/INR - ( 01 Jun 2022 08:20 )   PT: 37.3 sec;   INR: 3.18 ratio  (INR 3.1 on 5/31)    Micro:  BCx 5/26 -- NGTD  Respiratory viral panel 5/26 -- positive hMPV    Radiology:  Xray Chest 1 View- PORTABLE-Urgent (05.26.22)  IMPRESSION: Slightly prominent bronchovascular markings in the right   middle versus lower lobe, without definite evidence of lobar   consolidation and may be accentuated by summation density arising from   the adjacent rib cage and overlying right breast shadow. If there are   abnormal breath sounds on auscultation in this region, evolving pneumonia   would then be considered. The remaining lungs are clear.    CT Chest No Cont (05.26.22)  IMPRESSION:    New findings in the right lower lobe and left upper lobe since June 2019,   favored to be infection. 3 month follow-up is recommended.      Medications:  MEDICATIONS  (STANDING):  ALBUTerol    90 MICROgram(s) HFA Inhaler 2 Puff(s) Inhalation every 4 hours  albuterol/ipratropium for Nebulization 3 milliLiter(s) Nebulizer every 6 hours  buDESOnide    Inhalation Suspension 0.5 milliGRAM(s) Inhalation every 12 hours  cefepime   IVPB 2000 milliGRAM(s) IV Intermittent every 12 hours  cholecalciferol 1000 Unit(s) Oral daily  cholestyramine Powder (Sugar-Free) 4 Gram(s) Oral daily  dextrose 5%. 1000 milliLiter(s) (100 mL/Hr) IV Continuous <Continuous>  dextrose 5%. 1000 milliLiter(s) (50 mL/Hr) IV Continuous <Continuous>  dextrose 50% Injectable 25 Gram(s) IV Push once  dextrose 50% Injectable 12.5 Gram(s) IV Push once  dextrose 50% Injectable 25 Gram(s) IV Push once  ezetimibe 10 milliGRAM(s) Oral daily  furosemide    Tablet 40 milliGRAM(s) Oral daily  gabapentin 800 milliGRAM(s) Oral three times a day  glucagon  Injectable 1 milliGRAM(s) IntraMuscular once  guaiFENesin  milliGRAM(s) Oral every 12 hours  insulin lispro (ADMELOG) corrective regimen sliding scale   SubCutaneous three times a day before meals  insulin lispro (ADMELOG) corrective regimen sliding scale   SubCutaneous at bedtime  methylPREDNISolone sodium succinate Injectable 40 milliGRAM(s) IV Push two times a day  metoprolol succinate  milliGRAM(s) Oral daily  montelukast 10 milliGRAM(s) Oral at bedtime  pravastatin 10 milliGRAM(s) Oral at bedtime  verapamil 120 milliGRAM(s) Oral two times a day  warfarin 5 milliGRAM(s) Oral daily    MEDICATIONS  (PRN):  acetaminophen     Tablet .. 650 milliGRAM(s) Oral every 6 hours PRN Temp greater or equal to 38C (100.4F), Mild Pain (1 - 3)  ALBUTerol    90 MICROgram(s) HFA Inhaler 2 Puff(s) Inhalation every 6 hours PRN Bronchospasm  benzocaine 15 mG/menthol 3.6 mG Lozenge 1 Lozenge Oral every 4 hours PRN Sore Throat  cholestyramine Powder (Sugar-Free) 4 Gram(s) Oral daily PRN GI upset  dextrose Oral Gel 15 Gram(s) Oral once PRN Blood Glucose LESS THAN 70 milliGRAM(s)/deciliter  zolpidem 5 milliGRAM(s) Oral at bedtime PRN Insomnia  zolpidem 5 milliGRAM(s) Oral at bedtime PRN Insomnia

## 2022-06-01 NOTE — PROGRESS NOTE ADULT - ASSESSMENT
- has hMPV infection w diffuse bronchospasm; cont isolation  - bronchospasm improvrd  - CT scan shows RLL infiltrate w mucoid impaction  - patient is steroid dependent; takes 10mg per day for legs  - cont solumedrol  - albuterol/ipratropium + budesonide nebs  - has focal RLL infiltrate. On cefeime/zithro  - dvt proph

## 2022-06-01 NOTE — PROGRESS NOTE ADULT - ASSESSMENT
72 y/o F with a PMHx significant for Atrial fibrillation on Coumadin, CHF (HFpEF), DM2 with peripheral neuropathy, peripheral venous insufficieny, morbid obesity, HTN, HLD presented to the [facility] for further evaluation and management of c/o symptoms of sore throat, nasal congestion, cough and worsening shortness of breath over the past 4 days. Found to have acute hypoxic respiratory failure d/t hMPV with concurrent RLL PNA, bronchitis.     #Acute hypoxic respiratory failure 2/2 hMPV, RLL PNA  #Bronchitis   - found to have hMPV on 5/26, isolation precautions   - CT chest noting new RLL consolidation   - Pulmonary following, appreciate recs  - S/p Azithro x5 days  - C/w Cefepime for 7 days total  - C/w Solumedrol 40mg BID, plan for daily starting tomorrow   - Continue albuterol/ipratropium + budesonide nebs  - albuterol standing q4  - mucinex BID, singular qHS  - supplemental O2 -- 2L NC, wean to RA    #Afib on warfarin   - On warfarin 5mg daily  - INR 3.18 today, will hold evening dose of warfarin  - continue to monitor INR  - continue verapamil, metoprolol      #DM2  - A1C 5.8   - Elevated blood glucose iso IV solumedrol   - Continue BRENDA  - continue to monitor while decreasing steroids   - c/w gabapentin 800mg TID    #PVD with chronic venous stasis skin changes  - continue PO lasix 40mg daily  - compression stocking    #HTN   - continue verapamil, metoprolol      #HLD  - Ezetimibe, pravastatin daily    #Hx of cholecystectomy   - c/w cholestyramine     #DVT ppx   - On Warfarin (hold next dose)    #Diet   - consistent carb     #Code  - Full    #Dispo  - Pending further clinical improvement   - PT eval

## 2022-06-01 NOTE — PHYSICAL THERAPY INITIAL EVALUATION ADULT - ACTIVE RANGE OF MOTION EXAMINATION, REHAB EVAL
Bilateral DF neutral./Left UE Active ROM was WFL (within functional limits)/Right UE Active ROM was WFL (within functional limits)/Left LE Active ROM was WFL (within functional limits)/Right LE Active ROM was WFL (within functional limits)

## 2022-06-02 LAB
ANION GAP SERPL CALC-SCNC: 7 MMOL/L — SIGNIFICANT CHANGE UP (ref 5–17)
BUN SERPL-MCNC: 28 MG/DL — HIGH (ref 7–23)
CALCIUM SERPL-MCNC: 9 MG/DL — SIGNIFICANT CHANGE UP (ref 8.5–10.1)
CHLORIDE SERPL-SCNC: 103 MMOL/L — SIGNIFICANT CHANGE UP (ref 96–108)
CO2 SERPL-SCNC: 26 MMOL/L — SIGNIFICANT CHANGE UP (ref 22–31)
CREAT SERPL-MCNC: 0.71 MG/DL — SIGNIFICANT CHANGE UP (ref 0.5–1.3)
EGFR: 90 ML/MIN/1.73M2 — SIGNIFICANT CHANGE UP
GLUCOSE SERPL-MCNC: 208 MG/DL — HIGH (ref 70–99)
HCT VFR BLD CALC: 46.8 % — HIGH (ref 34.5–45)
HGB BLD-MCNC: 15 G/DL — SIGNIFICANT CHANGE UP (ref 11.5–15.5)
INR BLD: 2.71 RATIO — HIGH (ref 0.88–1.16)
MCHC RBC-ENTMCNC: 30.9 PG — SIGNIFICANT CHANGE UP (ref 27–34)
MCHC RBC-ENTMCNC: 32.1 GM/DL — SIGNIFICANT CHANGE UP (ref 32–36)
MCV RBC AUTO: 96.3 FL — SIGNIFICANT CHANGE UP (ref 80–100)
PLATELET # BLD AUTO: 292 K/UL — SIGNIFICANT CHANGE UP (ref 150–400)
POTASSIUM SERPL-MCNC: 4.4 MMOL/L — SIGNIFICANT CHANGE UP (ref 3.5–5.3)
POTASSIUM SERPL-SCNC: 4.4 MMOL/L — SIGNIFICANT CHANGE UP (ref 3.5–5.3)
PROTHROM AB SERPL-ACNC: 31.7 SEC — HIGH (ref 10.5–13.4)
RBC # BLD: 4.86 M/UL — SIGNIFICANT CHANGE UP (ref 3.8–5.2)
RBC # FLD: 13.2 % — SIGNIFICANT CHANGE UP (ref 10.3–14.5)
SODIUM SERPL-SCNC: 136 MMOL/L — SIGNIFICANT CHANGE UP (ref 135–145)
WBC # BLD: 16.7 K/UL — HIGH (ref 3.8–10.5)
WBC # FLD AUTO: 16.7 K/UL — HIGH (ref 3.8–10.5)

## 2022-06-02 PROCEDURE — 99232 SBSQ HOSP IP/OBS MODERATE 35: CPT

## 2022-06-02 RX ORDER — CEFEPIME 1 G/1
2000 INJECTION, POWDER, FOR SOLUTION INTRAMUSCULAR; INTRAVENOUS ONCE
Refills: 0 | Status: DISCONTINUED | OUTPATIENT
Start: 2022-06-02 | End: 2022-06-02

## 2022-06-02 RX ORDER — SODIUM CHLORIDE 0.65 %
2 AEROSOL, SPRAY (ML) NASAL ONCE
Refills: 0 | Status: COMPLETED | OUTPATIENT
Start: 2022-06-02 | End: 2022-06-02

## 2022-06-02 RX ORDER — CEFEPIME 1 G/1
2000 INJECTION, POWDER, FOR SOLUTION INTRAMUSCULAR; INTRAVENOUS ONCE
Refills: 0 | Status: COMPLETED | OUTPATIENT
Start: 2022-06-02 | End: 2022-06-02

## 2022-06-02 RX ADMIN — MONTELUKAST 10 MILLIGRAM(S): 4 TABLET, CHEWABLE ORAL at 21:05

## 2022-06-02 RX ADMIN — Medication 3 MILLILITER(S): at 08:22

## 2022-06-02 RX ADMIN — CHOLESTYRAMINE 4 GRAM(S): 4 POWDER, FOR SUSPENSION ORAL at 08:34

## 2022-06-02 RX ADMIN — Medication 2: at 12:18

## 2022-06-02 RX ADMIN — Medication 3: at 16:57

## 2022-06-02 RX ADMIN — Medication 1000 UNIT(S): at 09:42

## 2022-06-02 RX ADMIN — Medication 100 MILLIGRAM(S): at 09:43

## 2022-06-02 RX ADMIN — Medication 3 MILLILITER(S): at 20:41

## 2022-06-02 RX ADMIN — Medication 600 MILLIGRAM(S): at 21:05

## 2022-06-02 RX ADMIN — Medication 1: at 21:06

## 2022-06-02 RX ADMIN — Medication 40 MILLIGRAM(S): at 09:40

## 2022-06-02 RX ADMIN — Medication 1: at 08:24

## 2022-06-02 RX ADMIN — CEFEPIME 100 MILLIGRAM(S): 1 INJECTION, POWDER, FOR SOLUTION INTRAMUSCULAR; INTRAVENOUS at 21:06

## 2022-06-02 RX ADMIN — Medication 0.5 MILLIGRAM(S): at 20:41

## 2022-06-02 RX ADMIN — GABAPENTIN 800 MILLIGRAM(S): 400 CAPSULE ORAL at 21:05

## 2022-06-02 RX ADMIN — Medication 120 MILLIGRAM(S): at 21:08

## 2022-06-02 RX ADMIN — CEFEPIME 100 MILLIGRAM(S): 1 INJECTION, POWDER, FOR SOLUTION INTRAMUSCULAR; INTRAVENOUS at 09:43

## 2022-06-02 RX ADMIN — Medication 3 MILLILITER(S): at 02:38

## 2022-06-02 RX ADMIN — Medication 600 MILLIGRAM(S): at 09:43

## 2022-06-02 RX ADMIN — GABAPENTIN 800 MILLIGRAM(S): 400 CAPSULE ORAL at 06:20

## 2022-06-02 RX ADMIN — ZOLPIDEM TARTRATE 5 MILLIGRAM(S): 10 TABLET ORAL at 00:39

## 2022-06-02 RX ADMIN — Medication 120 MILLIGRAM(S): at 09:41

## 2022-06-02 RX ADMIN — BENZOCAINE AND MENTHOL 1 LOZENGE: 5; 1 LIQUID ORAL at 06:22

## 2022-06-02 RX ADMIN — EZETIMIBE 10 MILLIGRAM(S): 10 TABLET ORAL at 21:05

## 2022-06-02 RX ADMIN — Medication 10 MILLIGRAM(S): at 21:07

## 2022-06-02 RX ADMIN — Medication 40 MILLIGRAM(S): at 09:42

## 2022-06-02 NOTE — PROGRESS NOTE ADULT - SUBJECTIVE AND OBJECTIVE BOX
Chief Complaint: Congestion, SOB, cough      Interval events:   - No acute events overnight, VSS with O2 96% on RA  - Pt reports continued improvement in SOB/cough, now weaned off 2L NC breathing comfortably on RA  - INR at goal today 2.7    ROS:   Patient denies any current chest pain, f/c/n/v/d, abd pain, LE edema, myalgias, dysuria, HA, dizziness  All other review of systems is negative unless indicated above    Physical Exam:  Vital Signs Last 24 Hrs  T(C): 36.4 (02 Jun 2022 08:10), Max: 36.7 (01 Jun 2022 23:46)  T(F): 97.6 (02 Jun 2022 08:10), Max: 98 (01 Jun 2022 23:46)  HR: 98 (02 Jun 2022 08:45) (72 - 103)  BP: 154/73 (02 Jun 2022 08:10) (132/78 - 154/73)  BP(mean): --  RR: 18 (02 Jun 2022 08:10) (18 - 18)  SpO2: 96% (02 Jun 2022 08:10) (91% - 98%)    Constitutional: NAD, awake and alert, obese female  HEENT: PERRLA, EOMI, MMM  Respiratory: +rhonchi/mild wheezing bilaterally   Cardiovascular: S1 and S2, RRR, no murmurs, gallops or rubs  Gastrointestinal: +BS, soft, non-tender, non-distended, no CVA tenderness  Extremities: 1+ pitting edema b/l LEs, +DP pulses b/l  Neurological: A&O x 3, no focal deficits  Musculoskeletal: 5/5 strength b/l upper and lower extremities  Skin: LE chronic venous stasis changes, skin warm and dry    Labs:  Glucose 208 mg/dL  HCO3 26 mmol/L  Chloride 103 mmol/L  Sodium 136 mmol/L>   Potassium 4.4 mmol/L  Creatinine 0.71 mg/dL  Calcium 9.0 mg/dL  BUN 28 mg/dL  eGFR 90 mL/min/1.73m2  Anion gap 7 mmol/L    WBC 16.70  Hemoglobin 15.0  Hemoatocrit 46.8  Platelet count 292    PT/INR - ( 02 Jun 2022 08:57 )   PT: 31.7 sec;   INR: 2.71 ratio      Micro:  BCx 5/26 -- NGTD  Respiratory viral panel 5/26 -- positive hMPV    Radiology:  Xray Chest 1 View- PORTABLE-Urgent (05.26.22)  IMPRESSION: Slightly prominent bronchovascular markings in the right   middle versus lower lobe, without definite evidence of lobar   consolidation and may be accentuated by summation density arising from   the adjacent rib cage and overlying right breast shadow. If there are   abnormal breath sounds on auscultation in this region, evolving pneumonia   would then be considered. The remaining lungs are clear.    CT Chest No Cont (05.26.22)  IMPRESSION:    New findings in the right lower lobe and left upper lobe since June 2019,   favored to be infection. 3 month follow-up is recommended.      Medications:  MEDICATIONS  (STANDING):  ALBUTerol    90 MICROgram(s) HFA Inhaler 2 Puff(s) Inhalation every 4 hours  albuterol/ipratropium for Nebulization 3 milliLiter(s) Nebulizer every 6 hours  buDESOnide    Inhalation Suspension 0.5 milliGRAM(s) Inhalation every 12 hours  cefepime   IVPB 2000 milliGRAM(s) IV Intermittent once  cholecalciferol 1000 Unit(s) Oral daily  cholestyramine Powder (Sugar-Free) 4 Gram(s) Oral daily  dextrose 5%. 1000 milliLiter(s) (100 mL/Hr) IV Continuous <Continuous>  dextrose 5%. 1000 milliLiter(s) (50 mL/Hr) IV Continuous <Continuous>  dextrose 50% Injectable 25 Gram(s) IV Push once  dextrose 50% Injectable 12.5 Gram(s) IV Push once  dextrose 50% Injectable 25 Gram(s) IV Push once  ezetimibe 10 milliGRAM(s) Oral daily  furosemide    Tablet 40 milliGRAM(s) Oral daily  gabapentin 800 milliGRAM(s) Oral three times a day  glucagon  Injectable 1 milliGRAM(s) IntraMuscular once  guaiFENesin  milliGRAM(s) Oral every 12 hours  insulin lispro (ADMELOG) corrective regimen sliding scale   SubCutaneous three times a day before meals  insulin lispro (ADMELOG) corrective regimen sliding scale   SubCutaneous at bedtime  metoprolol succinate  milliGRAM(s) Oral daily  montelukast 10 milliGRAM(s) Oral at bedtime  pravastatin 10 milliGRAM(s) Oral at bedtime  verapamil 120 milliGRAM(s) Oral two times a day    MEDICATIONS  (PRN):  acetaminophen     Tablet .. 650 milliGRAM(s) Oral every 6 hours PRN Temp greater or equal to 38C (100.4F), Mild Pain (1 - 3)  ALBUTerol    90 MICROgram(s) HFA Inhaler 2 Puff(s) Inhalation every 6 hours PRN Bronchospasm  benzocaine 15 mG/menthol 3.6 mG Lozenge 1 Lozenge Oral every 4 hours PRN Sore Throat  cholestyramine Powder (Sugar-Free) 4 Gram(s) Oral daily PRN GI upset  dextrose Oral Gel 15 Gram(s) Oral once PRN Blood Glucose LESS THAN 70 milliGRAM(s)/deciliter  zolpidem 5 milliGRAM(s) Oral at bedtime PRN Insomnia  zolpidem 5 milliGRAM(s) Oral at bedtime PRN Insomnia

## 2022-06-02 NOTE — PROGRESS NOTE ADULT - ASSESSMENT
72 y/o F with a PMHx significant for Atrial fibrillation on Coumadin, CHF (HFpEF), DM2 with peripheral neuropathy, peripheral venous insufficieny, morbid obesity, HTN, HLD presented to the [facility] for further evaluation and management of c/o symptoms of sore throat, nasal congestion, cough and worsening shortness of breath over the past 4 days. Found to have acute hypoxic respiratory failure d/t hMPV with concurrent RLL PNA, bronchitis.     #Acute hypoxic respiratory failure 2/2 hMPV, RLL PNA  #Bronchitis   - found to have hMPV on 5/26, isolation precautions   - CT chest noting new RLL consolidation   - Pulmonary following, appreciate recs  - S/p Azithro x5 days  - S/p Cefepime for 7 days total  - C/w Solumedrol 40mg daily, will transition to PO prednisone tomorrow given improvement   - Continue albuterol/ipratropium + budesonide nebs  - albuterol standing q4  - mucinex BID, singular qHS  - previously on 2L NC now weaned to RA, breathing comfortably with O2 >95%    #Afib on warfarin   - On warfarin 5mg daily  - INR at goal today 2.71, will receive evening dose   - continue to monitor INR  - continue verapamil, metoprolol      #DM2  - A1C 5.8   - Elevated blood glucose iso IV solumedrol   - Continue BRENDA  - continue to monitor while decreasing steroids   - c/w gabapentin 800mg TID    #PVD with chronic venous stasis skin changes  - continue PO lasix 40mg daily  - compression stocking    #HTN   - continue verapamil, metoprolol      #HLD  - Ezetimibe, pravastatin daily    #Hx of cholecystectomy   - c/w cholestyramine     #DVT ppx   - On Warfarin     #Diet   - consistent carb     #Code  - Full    #Dispo  - Possible discharge in next 1-2 days   - Rec'd for home with home PT

## 2022-06-03 LAB
INR BLD: 2.05 RATIO — HIGH (ref 0.88–1.16)
PROTHROM AB SERPL-ACNC: 24 SEC — HIGH (ref 10.5–13.4)

## 2022-06-03 PROCEDURE — 99233 SBSQ HOSP IP/OBS HIGH 50: CPT

## 2022-06-03 RX ORDER — WARFARIN SODIUM 2.5 MG/1
5 TABLET ORAL ONCE
Refills: 0 | Status: COMPLETED | OUTPATIENT
Start: 2022-06-03 | End: 2022-06-03

## 2022-06-03 RX ORDER — IPRATROPIUM/ALBUTEROL SULFATE 18-103MCG
3 AEROSOL WITH ADAPTER (GRAM) INHALATION
Qty: 360 | Refills: 0
Start: 2022-06-03 | End: 2022-07-02

## 2022-06-03 RX ORDER — WARFARIN SODIUM 2.5 MG/1
5 TABLET ORAL DAILY
Refills: 0 | Status: DISCONTINUED | OUTPATIENT
Start: 2022-06-03 | End: 2022-06-03

## 2022-06-03 RX ORDER — CHOLECALCIFEROL (VITAMIN D3) 125 MCG
1 CAPSULE ORAL
Qty: 0 | Refills: 0 | DISCHARGE

## 2022-06-03 RX ORDER — OMEGA-3 ACID ETHYL ESTERS 1 G
1 CAPSULE ORAL
Qty: 0 | Refills: 0 | DISCHARGE

## 2022-06-03 RX ORDER — BUDESONIDE AND FORMOTEROL FUMARATE DIHYDRATE 160; 4.5 UG/1; UG/1
2 AEROSOL RESPIRATORY (INHALATION)
Qty: 1 | Refills: 0
Start: 2022-06-03 | End: 2022-07-02

## 2022-06-03 RX ORDER — WARFARIN SODIUM 2.5 MG/1
5 TABLET ORAL AT BEDTIME
Refills: 0 | Status: DISCONTINUED | OUTPATIENT
Start: 2022-06-03 | End: 2022-06-05

## 2022-06-03 RX ORDER — ALBUTEROL 90 UG/1
2 AEROSOL, METERED ORAL
Qty: 1 | Refills: 0
Start: 2022-06-03 | End: 2022-07-02

## 2022-06-03 RX ADMIN — CHOLESTYRAMINE 4 GRAM(S): 4 POWDER, FOR SUSPENSION ORAL at 12:21

## 2022-06-03 RX ADMIN — Medication 100 MILLIGRAM(S): at 09:36

## 2022-06-03 RX ADMIN — Medication 5: at 17:31

## 2022-06-03 RX ADMIN — GABAPENTIN 800 MILLIGRAM(S): 400 CAPSULE ORAL at 16:13

## 2022-06-03 RX ADMIN — Medication 3 MILLILITER(S): at 13:09

## 2022-06-03 RX ADMIN — Medication 40 MILLIGRAM(S): at 09:36

## 2022-06-03 RX ADMIN — Medication 600 MILLIGRAM(S): at 09:36

## 2022-06-03 RX ADMIN — WARFARIN SODIUM 5 MILLIGRAM(S): 2.5 TABLET ORAL at 21:13

## 2022-06-03 RX ADMIN — Medication 650 MILLIGRAM(S): at 12:28

## 2022-06-03 RX ADMIN — MONTELUKAST 10 MILLIGRAM(S): 4 TABLET, CHEWABLE ORAL at 21:13

## 2022-06-03 RX ADMIN — Medication 3 MILLILITER(S): at 01:27

## 2022-06-03 RX ADMIN — ZOLPIDEM TARTRATE 5 MILLIGRAM(S): 10 TABLET ORAL at 00:30

## 2022-06-03 RX ADMIN — Medication 120 MILLIGRAM(S): at 21:13

## 2022-06-03 RX ADMIN — Medication 600 MILLIGRAM(S): at 21:13

## 2022-06-03 RX ADMIN — Medication 2 SPRAY(S): at 00:23

## 2022-06-03 RX ADMIN — Medication 3 MILLILITER(S): at 20:53

## 2022-06-03 RX ADMIN — GABAPENTIN 800 MILLIGRAM(S): 400 CAPSULE ORAL at 21:12

## 2022-06-03 RX ADMIN — Medication 120 MILLIGRAM(S): at 09:36

## 2022-06-03 RX ADMIN — BENZOCAINE AND MENTHOL 1 LOZENGE: 5; 1 LIQUID ORAL at 21:14

## 2022-06-03 RX ADMIN — WARFARIN SODIUM 5 MILLIGRAM(S): 2.5 TABLET ORAL at 00:24

## 2022-06-03 RX ADMIN — Medication 2: at 12:22

## 2022-06-03 RX ADMIN — GABAPENTIN 800 MILLIGRAM(S): 400 CAPSULE ORAL at 05:33

## 2022-06-03 RX ADMIN — Medication 3 MILLILITER(S): at 07:58

## 2022-06-03 RX ADMIN — Medication 1000 UNIT(S): at 09:36

## 2022-06-03 RX ADMIN — Medication 650 MILLIGRAM(S): at 13:00

## 2022-06-03 RX ADMIN — Medication 10 MILLIGRAM(S): at 21:13

## 2022-06-03 RX ADMIN — EZETIMIBE 10 MILLIGRAM(S): 10 TABLET ORAL at 21:12

## 2022-06-03 NOTE — PROGRESS NOTE ADULT - ASSESSMENT
- has hMPV infection w diffuse bronchospasm; cont isolation  - bronchospasm improvrd  - CT scan showed RLL infiltrate w mucoid impaction  - patient is steroid dependent; takes 10mg per day for legs  - cont solumedrol  - albuterol/ipratropium + budesonide nebs  -  off cefepime/zithro  - dvt proph

## 2022-06-03 NOTE — PROGRESS NOTE ADULT - ASSESSMENT
72 y/o F with a PMHx significant for Atrial fibrillation on Coumadin, CHF (HFpEF), DM2 with peripheral neuropathy, peripheral venous insufficieny, morbid obesity, HTN, HLD presented to the [facility] for further evaluation and management of c/o symptoms of sore throat, nasal congestion, cough and worsening shortness of breath over the past 4 days. Found to have acute hypoxic respiratory failure d/t hMPV with concurrent RLL PNA, bronchitis.     #Acute hypoxic respiratory failure 2/2 hMPV, RLL PNA  #Bronchitis   - found to have hMPV on 5/26, isolation precautions   - CT chest noting new RLL consolidation   - Pulmonary following, appreciate recs  - S/p Azithro x5 days  - S/p Cefepime for 7 days total  - C/w Prednisone 40mg daily, continue taper to patient's home dose of prednisone 10mg daily    - Continue albuterol/ipratropium + budesonide nebs  - albuterol standing q4  - mucinex BID, singular qHS  - previously on 2L NC now weaned to RA, breathing comfortably with O2 >95%    #Afib on warfarin   - On warfarin 5mg daily  - INR goal 2-3, today 2.05 (down from 2.71), will receive evening dose   - continue to monitor INR  - continue verapamil, metoprolol      #DM2  - A1C 5.8   - Elevated blood glucose iso steroids   - Continue BRENDA  - continue to monitor while decreasing steroids   - c/w gabapentin 800mg TID    #PVD with chronic venous stasis skin changes  - continue PO lasix 40mg daily  - compression stocking    #HTN   - continue verapamil, metoprolol      #HLD  - Ezetimibe, pravastatin daily    #Hx of cholecystectomy   - c/w cholestyramine     #DVT ppx   - On Warfarin     #Diet   - consistent carb     #Code  - Full    #Dispo  - Possible discharge tomorrow pending clinical improvement, pulm recs   - Rec'd for home with home PT

## 2022-06-03 NOTE — PROGRESS NOTE ADULT - SUBJECTIVE AND OBJECTIVE BOX
Chief Complaint: Patient is a 72y old  Female who presents with a chief complaint of Congestion and difficulty breathing (02 Jun 2022 12:27)      Interval events:   - No acute events overnight, VSS, pt afebrile   -     ROS:   Patient denies any current chest pain, SOB, cough, f/c/n/v/d, abd pain, LE edema, myalgias, dysuria, HA, dizziness  All other review of systems is negative unless indicated above    Physical Exam:  Vital Signs Last 24 Hrs  T(C): 36.3 (03 Jun 2022 08:47), Max: 36.5 (02 Jun 2022 16:46)  T(F): 97.3 (03 Jun 2022 08:47), Max: 97.7 (02 Jun 2022 16:46)  HR: 90 (03 Jun 2022 08:47) (90 - 120)  BP: 157/86 (03 Jun 2022 08:47) (133/83 - 157/86)  BP(mean): --  RR: 18 (03 Jun 2022 08:47) (18 - 18)  SpO2: 93% (03 Jun 2022 08:47) (93% - 95%)    Constitutional: NAD, awake and alert, obese female  HEENT: PERRLA, EOMI, MMM  Respiratory: +rhonchi/mild wheezing bilaterally   Cardiovascular: S1 and S2, RRR, no murmurs, gallops or rubs  Gastrointestinal: +BS, soft, non-tender, non-distended, no CVA tenderness  Extremities: 1+ pitting edema b/l LEs, +DP pulses b/l  Neurological: A&O x 3, no focal deficits  Musculoskeletal: 5/5 strength b/l upper and lower extremities  Skin: LE chronic venous stasis changes, skin warm and dry    Labs:  No BMP/CBC within the past 24 hours    PT/INR - ( 03 Jun 2022 08:15 )   PT: 24.0 sec;   INR: 2.05 ratio      Micro:  BCx 5/26 -- NGTD  Respiratory viral panel 5/26 -- positive hMPV    Radiology:  Xray Chest 1 View- PORTABLE-Urgent (05.26.22)  IMPRESSION: Slightly prominent bronchovascular markings in the right   middle versus lower lobe, without definite evidence of lobar   consolidation and may be accentuated by summation density arising from   the adjacent rib cage and overlying right breast shadow. If there are   abnormal breath sounds on auscultation in this region, evolving pneumonia   would then be considered. The remaining lungs are clear.    CT Chest No Cont (05.26.22)  IMPRESSION:    New findings in the right lower lobe and left upper lobe since June 2019,   favored to be infection. 3 month follow-up is recommended.    Medications:  MEDICATIONS  (STANDING):  ALBUTerol    90 MICROgram(s) HFA Inhaler 2 Puff(s) Inhalation every 4 hours  albuterol/ipratropium for Nebulization 3 milliLiter(s) Nebulizer every 6 hours  buDESOnide    Inhalation Suspension 0.5 milliGRAM(s) Inhalation every 12 hours  cholecalciferol 1000 Unit(s) Oral daily  cholestyramine Powder (Sugar-Free) 4 Gram(s) Oral daily  dextrose 5%. 1000 milliLiter(s) (50 mL/Hr) IV Continuous <Continuous>  dextrose 5%. 1000 milliLiter(s) (100 mL/Hr) IV Continuous <Continuous>  dextrose 50% Injectable 25 Gram(s) IV Push once  dextrose 50% Injectable 12.5 Gram(s) IV Push once  dextrose 50% Injectable 25 Gram(s) IV Push once  ezetimibe 10 milliGRAM(s) Oral daily  furosemide    Tablet 40 milliGRAM(s) Oral daily  gabapentin 800 milliGRAM(s) Oral three times a day  glucagon  Injectable 1 milliGRAM(s) IntraMuscular once  guaiFENesin  milliGRAM(s) Oral every 12 hours  insulin lispro (ADMELOG) corrective regimen sliding scale   SubCutaneous three times a day before meals  insulin lispro (ADMELOG) corrective regimen sliding scale   SubCutaneous at bedtime  metoprolol succinate  milliGRAM(s) Oral daily  montelukast 10 milliGRAM(s) Oral at bedtime  pravastatin 10 milliGRAM(s) Oral at bedtime  predniSONE   Tablet 40 milliGRAM(s) Oral daily  verapamil 120 milliGRAM(s) Oral two times a day  warfarin 5 milliGRAM(s) Oral at bedtime    MEDICATIONS  (PRN):  acetaminophen     Tablet .. 650 milliGRAM(s) Oral every 6 hours PRN Temp greater or equal to 38C (100.4F), Mild Pain (1 - 3)  ALBUTerol    90 MICROgram(s) HFA Inhaler 2 Puff(s) Inhalation every 6 hours PRN Bronchospasm  benzocaine 15 mG/menthol 3.6 mG Lozenge 1 Lozenge Oral every 4 hours PRN Sore Throat  cholestyramine Powder (Sugar-Free) 4 Gram(s) Oral daily PRN GI upset  dextrose Oral Gel 15 Gram(s) Oral once PRN Blood Glucose LESS THAN 70 milliGRAM(s)/deciliter  zolpidem 5 milliGRAM(s) Oral at bedtime PRN Insomnia     Chief Complaint: Congestion, SOB, cough      Interval events:   - No acute events overnight, VSS with O2 95% on RA  - Pt feels relatively unchanged from yesterday - still with mild SOB/cough, chest tightness, wheezing   - Transitioned to PO prednisone today     ROS:   Patient denies any current chest pain, f/c/n/v/d, abd pain, LE edema, myalgias, dysuria, HA, dizziness  All other review of systems is negative unless indicated above    Physical Exam:  Vital Signs Last 24 Hrs  T(C): 36.3 (03 Jun 2022 08:47), Max: 36.5 (02 Jun 2022 16:46)  T(F): 97.3 (03 Jun 2022 08:47), Max: 97.7 (02 Jun 2022 16:46)  HR: 90 (03 Jun 2022 08:47) (90 - 120)  BP: 157/86 (03 Jun 2022 08:47) (133/83 - 157/86)  BP(mean): --  RR: 18 (03 Jun 2022 08:47) (18 - 18)  SpO2: 93% (03 Jun 2022 08:47) (93% - 95%)    Constitutional: NAD, awake and alert, obese female  HEENT: PERRLA, EOMI, MMM  Respiratory: +rhonchi/mild wheezing bilaterally   Cardiovascular: S1 and S2, RRR, no murmurs, gallops or rubs  Gastrointestinal: +BS, soft, non-tender, non-distended, no CVA tenderness  Extremities: 1+ pitting edema b/l LEs, +DP pulses b/l  Neurological: A&O x 3, no focal deficits  Musculoskeletal: 5/5 strength b/l upper and lower extremities  Skin: LE chronic venous stasis changes, skin warm and dry    Labs:  No BMP/CBC within the past 24 hours    PT/INR - ( 03 Jun 2022 08:15 )   PT: 24.0 sec;   INR: 2.05 ratio      Micro:  BCx 5/26 -- NGTD  Respiratory viral panel 5/26 -- positive hMPV    Radiology:  Xray Chest 1 View- PORTABLE-Urgent (05.26.22)  IMPRESSION: Slightly prominent bronchovascular markings in the right   middle versus lower lobe, without definite evidence of lobar   consolidation and may be accentuated by summation density arising from   the adjacent rib cage and overlying right breast shadow. If there are   abnormal breath sounds on auscultation in this region, evolving pneumonia   would then be considered. The remaining lungs are clear.    CT Chest No Cont (05.26.22)  IMPRESSION:    New findings in the right lower lobe and left upper lobe since June 2019,   favored to be infection. 3 month follow-up is recommended.    Medications:  MEDICATIONS  (STANDING):  ALBUTerol    90 MICROgram(s) HFA Inhaler 2 Puff(s) Inhalation every 4 hours  albuterol/ipratropium for Nebulization 3 milliLiter(s) Nebulizer every 6 hours  buDESOnide    Inhalation Suspension 0.5 milliGRAM(s) Inhalation every 12 hours  cholecalciferol 1000 Unit(s) Oral daily  cholestyramine Powder (Sugar-Free) 4 Gram(s) Oral daily  dextrose 5%. 1000 milliLiter(s) (50 mL/Hr) IV Continuous <Continuous>  dextrose 5%. 1000 milliLiter(s) (100 mL/Hr) IV Continuous <Continuous>  dextrose 50% Injectable 25 Gram(s) IV Push once  dextrose 50% Injectable 12.5 Gram(s) IV Push once  dextrose 50% Injectable 25 Gram(s) IV Push once  ezetimibe 10 milliGRAM(s) Oral daily  furosemide    Tablet 40 milliGRAM(s) Oral daily  gabapentin 800 milliGRAM(s) Oral three times a day  glucagon  Injectable 1 milliGRAM(s) IntraMuscular once  guaiFENesin  milliGRAM(s) Oral every 12 hours  insulin lispro (ADMELOG) corrective regimen sliding scale   SubCutaneous three times a day before meals  insulin lispro (ADMELOG) corrective regimen sliding scale   SubCutaneous at bedtime  metoprolol succinate  milliGRAM(s) Oral daily  montelukast 10 milliGRAM(s) Oral at bedtime  pravastatin 10 milliGRAM(s) Oral at bedtime  predniSONE   Tablet 40 milliGRAM(s) Oral daily  verapamil 120 milliGRAM(s) Oral two times a day  warfarin 5 milliGRAM(s) Oral at bedtime    MEDICATIONS  (PRN):  acetaminophen     Tablet .. 650 milliGRAM(s) Oral every 6 hours PRN Temp greater or equal to 38C (100.4F), Mild Pain (1 - 3)  ALBUTerol    90 MICROgram(s) HFA Inhaler 2 Puff(s) Inhalation every 6 hours PRN Bronchospasm  benzocaine 15 mG/menthol 3.6 mG Lozenge 1 Lozenge Oral every 4 hours PRN Sore Throat  cholestyramine Powder (Sugar-Free) 4 Gram(s) Oral daily PRN GI upset  dextrose Oral Gel 15 Gram(s) Oral once PRN Blood Glucose LESS THAN 70 milliGRAM(s)/deciliter  zolpidem 5 milliGRAM(s) Oral at bedtime PRN Insomnia

## 2022-06-03 NOTE — PROGRESS NOTE ADULT - SUBJECTIVE AND OBJECTIVE BOX
Subjective:  still dyspneic    MEDICATIONS  (STANDING):  ALBUTerol    90 MICROgram(s) HFA Inhaler 2 Puff(s) Inhalation every 4 hours  albuterol/ipratropium for Nebulization 3 milliLiter(s) Nebulizer every 6 hours  buDESOnide    Inhalation Suspension 0.5 milliGRAM(s) Inhalation every 12 hours  cholecalciferol 1000 Unit(s) Oral daily  cholestyramine Powder (Sugar-Free) 4 Gram(s) Oral daily  dextrose 5%. 1000 milliLiter(s) (100 mL/Hr) IV Continuous <Continuous>  dextrose 5%. 1000 milliLiter(s) (50 mL/Hr) IV Continuous <Continuous>  dextrose 50% Injectable 25 Gram(s) IV Push once  dextrose 50% Injectable 12.5 Gram(s) IV Push once  dextrose 50% Injectable 25 Gram(s) IV Push once  ezetimibe 10 milliGRAM(s) Oral daily  furosemide    Tablet 40 milliGRAM(s) Oral daily  gabapentin 800 milliGRAM(s) Oral three times a day  glucagon  Injectable 1 milliGRAM(s) IntraMuscular once  guaiFENesin  milliGRAM(s) Oral every 12 hours  insulin lispro (ADMELOG) corrective regimen sliding scale   SubCutaneous three times a day before meals  insulin lispro (ADMELOG) corrective regimen sliding scale   SubCutaneous at bedtime  metoprolol succinate  milliGRAM(s) Oral daily  montelukast 10 milliGRAM(s) Oral at bedtime  pravastatin 10 milliGRAM(s) Oral at bedtime  predniSONE   Tablet 40 milliGRAM(s) Oral daily  verapamil 120 milliGRAM(s) Oral two times a day  warfarin 5 milliGRAM(s) Oral at bedtime    MEDICATIONS  (PRN):  acetaminophen     Tablet .. 650 milliGRAM(s) Oral every 6 hours PRN Temp greater or equal to 38C (100.4F), Mild Pain (1 - 3)  ALBUTerol    90 MICROgram(s) HFA Inhaler 2 Puff(s) Inhalation every 6 hours PRN Bronchospasm  benzocaine 15 mG/menthol 3.6 mG Lozenge 1 Lozenge Oral every 4 hours PRN Sore Throat  cholestyramine Powder (Sugar-Free) 4 Gram(s) Oral daily PRN GI upset  dextrose Oral Gel 15 Gram(s) Oral once PRN Blood Glucose LESS THAN 70 milliGRAM(s)/deciliter  zolpidem 5 milliGRAM(s) Oral at bedtime PRN Insomnia      Allergies    penicillin (Hives)    Intolerances        REVIEW OF SYSTEMS:    CONSTITUTIONAL:  As per HPI.  HEENT:  Eyes:  No diplopia or blurred vision. ENT:  No earache, sore throat or runny nose.  CARDIOVASCULAR:  No pressure, squeezing, tightness, heaviness or aching about the chest, neck, axilla or epigastrium.  RESPIRATORY:  shortness of breath.  GASTROINTESTINAL:  No nausea, vomiting or diarrhea.  GENITOURINARY:  No dysuria, frequency or urgency.  MUSCULOSKELETAL:  no joint pain, deformity, tenderness  EXTREMITIES: no clubbing cyanosis,edema  SKIN:  No change in skin, hair or nails.  NEUROLOGIC:  No paresthesias, fasciculations, seizures or weakness.  PSYCHIATRIC:  No disorder of thought or mood.  ENDOCRINE:  No heat or cold intolerance, polyuria or polydipsia.  HEMATOLOGICAL:  No easy bruising or bleedings:    Vital Signs Last 24 Hrs  T(C): 36.3 (03 Jun 2022 08:47), Max: 36.3 (02 Jun 2022 23:33)  T(F): 97.3 (03 Jun 2022 08:47), Max: 97.4 (02 Jun 2022 23:33)  HR: 90 (03 Jun 2022 08:47) (90 - 120)  BP: 157/86 (03 Jun 2022 08:47) (147/88 - 157/86)  BP(mean): --  RR: 18 (03 Jun 2022 08:47) (18 - 18)  SpO2: 93% (03 Jun 2022 08:47) (93% - 95%)    PHYSICAL EXAMINATION:  SKIN: no rashes  HEAD: NC/AT  EYES: PERRLA, EOMI  EARS: TM's intact  NOSE: no abnormalities  NECK:  Supple. No lymphadenopathy. Jugular venous pressure not elevated. Carotids equal.   HEART:   The cardiac impulse has a normal quality. Reg., Nl S1 and S2.  There are no murmurs, rubs or gallops noted  CHEST:  Bilateral expiratory rhonchi  ABDOMEN:  Soft and nontender.   EXTREMITIES:  no C/C/E  NEURO: AAO x 3, no focal deficts       LABS:                        15.0   16.70 )-----------( 292      ( 02 Jun 2022 08:57 )             46.8     06-02    136  |  103  |  28<H>  ----------------------------<  208<H>  4.4   |  26  |  0.71    Ca    9.0      02 Jun 2022 08:57      PT/INR - ( 03 Jun 2022 08:15 )   PT: 24.0 sec;   INR: 2.05 ratio               RADIOLOGY & ADDITIONAL TESTS:

## 2022-06-04 LAB
APTT BLD: 33.3 SEC — SIGNIFICANT CHANGE UP (ref 27.5–35.5)
INR BLD: 2.82 RATIO — HIGH (ref 0.88–1.16)
PROTHROM AB SERPL-ACNC: 33.1 SEC — HIGH (ref 10.5–13.4)

## 2022-06-04 PROCEDURE — 99233 SBSQ HOSP IP/OBS HIGH 50: CPT

## 2022-06-04 PROCEDURE — 99232 SBSQ HOSP IP/OBS MODERATE 35: CPT

## 2022-06-04 RX ORDER — ZOLPIDEM TARTRATE 10 MG/1
5 TABLET ORAL AT BEDTIME
Refills: 0 | Status: DISCONTINUED | OUTPATIENT
Start: 2022-06-04 | End: 2022-06-04

## 2022-06-04 RX ADMIN — Medication 1: at 11:57

## 2022-06-04 RX ADMIN — Medication 600 MILLIGRAM(S): at 21:25

## 2022-06-04 RX ADMIN — Medication 0.5 MILLIGRAM(S): at 08:40

## 2022-06-04 RX ADMIN — Medication 650 MILLIGRAM(S): at 03:04

## 2022-06-04 RX ADMIN — Medication 3 MILLILITER(S): at 08:40

## 2022-06-04 RX ADMIN — GABAPENTIN 800 MILLIGRAM(S): 400 CAPSULE ORAL at 21:24

## 2022-06-04 RX ADMIN — Medication 3 MILLILITER(S): at 20:59

## 2022-06-04 RX ADMIN — CHOLESTYRAMINE 4 GRAM(S): 4 POWDER, FOR SUSPENSION ORAL at 08:09

## 2022-06-04 RX ADMIN — WARFARIN SODIUM 5 MILLIGRAM(S): 2.5 TABLET ORAL at 21:25

## 2022-06-04 RX ADMIN — Medication 120 MILLIGRAM(S): at 21:25

## 2022-06-04 RX ADMIN — Medication 1: at 21:26

## 2022-06-04 RX ADMIN — Medication 40 MILLIGRAM(S): at 10:00

## 2022-06-04 RX ADMIN — BENZOCAINE AND MENTHOL 1 LOZENGE: 5; 1 LIQUID ORAL at 21:25

## 2022-06-04 RX ADMIN — Medication 120 MILLIGRAM(S): at 10:00

## 2022-06-04 RX ADMIN — Medication 600 MILLIGRAM(S): at 10:00

## 2022-06-04 RX ADMIN — ZOLPIDEM TARTRATE 5 MILLIGRAM(S): 10 TABLET ORAL at 00:18

## 2022-06-04 RX ADMIN — Medication 100 MILLIGRAM(S): at 10:00

## 2022-06-04 RX ADMIN — Medication 10 MILLIGRAM(S): at 21:25

## 2022-06-04 RX ADMIN — BENZOCAINE AND MENTHOL 1 LOZENGE: 5; 1 LIQUID ORAL at 03:05

## 2022-06-04 RX ADMIN — GABAPENTIN 800 MILLIGRAM(S): 400 CAPSULE ORAL at 14:04

## 2022-06-04 RX ADMIN — EZETIMIBE 10 MILLIGRAM(S): 10 TABLET ORAL at 21:25

## 2022-06-04 RX ADMIN — Medication 3 MILLILITER(S): at 02:30

## 2022-06-04 RX ADMIN — Medication 650 MILLIGRAM(S): at 03:35

## 2022-06-04 RX ADMIN — MONTELUKAST 10 MILLIGRAM(S): 4 TABLET, CHEWABLE ORAL at 21:25

## 2022-06-04 RX ADMIN — GABAPENTIN 800 MILLIGRAM(S): 400 CAPSULE ORAL at 05:47

## 2022-06-04 RX ADMIN — Medication 4: at 17:10

## 2022-06-04 RX ADMIN — Medication 1000 UNIT(S): at 10:00

## 2022-06-04 RX ADMIN — Medication 3 MILLILITER(S): at 13:42

## 2022-06-04 NOTE — PROGRESS NOTE ADULT - SUBJECTIVE AND OBJECTIVE BOX
HOSPITALIST ATTENDING PROGRESS NOTE    Chart and meds reviewed.  Patient seen and examined.    Subjective: Patient still feels a little tight, still coughing. No chest pain. No fever.    All other systems reviewed and found to be negative with the exception of what has been described above.    MEDICATIONS  (STANDING):  ALBUTerol    90 MICROgram(s) HFA Inhaler 2 Puff(s) Inhalation every 4 hours  albuterol/ipratropium for Nebulization 3 milliLiter(s) Nebulizer every 6 hours  buDESOnide    Inhalation Suspension 0.5 milliGRAM(s) Inhalation every 12 hours  cholecalciferol 1000 Unit(s) Oral daily  cholestyramine Powder (Sugar-Free) 4 Gram(s) Oral daily  dextrose 5%. 1000 milliLiter(s) (50 mL/Hr) IV Continuous <Continuous>  dextrose 5%. 1000 milliLiter(s) (100 mL/Hr) IV Continuous <Continuous>  dextrose 50% Injectable 25 Gram(s) IV Push once  dextrose 50% Injectable 12.5 Gram(s) IV Push once  dextrose 50% Injectable 25 Gram(s) IV Push once  ezetimibe 10 milliGRAM(s) Oral daily  furosemide    Tablet 40 milliGRAM(s) Oral daily  gabapentin 800 milliGRAM(s) Oral three times a day  glucagon  Injectable 1 milliGRAM(s) IntraMuscular once  guaiFENesin  milliGRAM(s) Oral every 12 hours  insulin lispro (ADMELOG) corrective regimen sliding scale   SubCutaneous three times a day before meals  insulin lispro (ADMELOG) corrective regimen sliding scale   SubCutaneous at bedtime  metoprolol succinate  milliGRAM(s) Oral daily  montelukast 10 milliGRAM(s) Oral at bedtime  pravastatin 10 milliGRAM(s) Oral at bedtime  predniSONE   Tablet 40 milliGRAM(s) Oral daily  verapamil 120 milliGRAM(s) Oral two times a day  warfarin 5 milliGRAM(s) Oral at bedtime    MEDICATIONS  (PRN):  acetaminophen     Tablet .. 650 milliGRAM(s) Oral every 6 hours PRN Temp greater or equal to 38C (100.4F), Mild Pain (1 - 3)  ALBUTerol    90 MICROgram(s) HFA Inhaler 2 Puff(s) Inhalation every 6 hours PRN Bronchospasm  benzocaine 15 mG/menthol 3.6 mG Lozenge 1 Lozenge Oral every 4 hours PRN Sore Throat  cholestyramine Powder (Sugar-Free) 4 Gram(s) Oral daily PRN GI upset  dextrose Oral Gel 15 Gram(s) Oral once PRN Blood Glucose LESS THAN 70 milliGRAM(s)/deciliter      VITALS:  T(F): 98 (06-04-22 @ 08:01), Max: 98 (06-04-22 @ 08:01)  HR: 97 (06-04-22 @ 08:50) (85 - 109)  BP: 147/88 (06-04-22 @ 08:01) (130/72 - 147/88)  RR: 18 (06-04-22 @ 08:01) (18 - 18)  SpO2: 97% (06-04-22 @ 08:01) (97% - 97%)      CAPILLARY BLOOD GLUCOSE      POCT Blood Glucose.: 119 mg/dL (04 Jun 2022 08:10)  POCT Blood Glucose.: 194 mg/dL (03 Jun 2022 21:11)  POCT Blood Glucose.: 398 mg/dL (03 Jun 2022 17:29)  POCT Blood Glucose.: 211 mg/dL (03 Jun 2022 12:17)      PHYSICAL EXAM:  GEN: Obese  HEENT:  pupils equal and reactive, EOMI, no oropharyngeal lesions, erythema, exudates, oral thrush  NECK:   supple, no carotid bruits, no palpable lymph nodes, no thyromegaly  CV:  +S1, +S2, regular, no murmurs or rubs  RESP:  Bilateral wheeze  BREAST:  not examined  GI:  abdomen soft, non-tender, non-distended, normal BS, no bruits, no abdominal masses, no palpable masses  RECTAL:  not examined  :  not examined  MSK:   normal muscle tone, no atrophy, no rigidity, no contractions  EXT:  no clubbing, no cyanosis, no edema, no calf pain, swelling or erythema  VASCULAR:  pulses equal and symmetric in the upper and lower extremities  NEURO:  AAOX3, no focal neurological deficits, follows all commands, able to move extremities spontaneously  SKIN:  no ulcers, lesions or rashes    LABS:    PT/INR - ( 04 Jun 2022 08:49 )   PT: 33.1 sec;   INR: 2.82 ratio     PTT - ( 04 Jun 2022 08:49 )  PTT:33.3 sec     CT Chest No Cont (05.26.22 @ 15:38) >  IMPRESSION:    New findings in the right lower lobe and left upper lobe since June 2019,   favored to be infection. 3 month follow-up is recommended.

## 2022-06-04 NOTE — PROGRESS NOTE ADULT - SUBJECTIVE AND OBJECTIVE BOX
Subjective:    Awake, alert. Still w/ coughing paroxysms. Unable to produce sputum.    MEDICATIONS  (STANDING):  ALBUTerol    90 MICROgram(s) HFA Inhaler 2 Puff(s) Inhalation every 4 hours  albuterol/ipratropium for Nebulization 3 milliLiter(s) Nebulizer every 6 hours  buDESOnide    Inhalation Suspension 0.5 milliGRAM(s) Inhalation every 12 hours  cholecalciferol 1000 Unit(s) Oral daily  cholestyramine Powder (Sugar-Free) 4 Gram(s) Oral daily  dextrose 5%. 1000 milliLiter(s) (100 mL/Hr) IV Continuous <Continuous>  dextrose 5%. 1000 milliLiter(s) (50 mL/Hr) IV Continuous <Continuous>  dextrose 50% Injectable 25 Gram(s) IV Push once  dextrose 50% Injectable 12.5 Gram(s) IV Push once  dextrose 50% Injectable 25 Gram(s) IV Push once  ezetimibe 10 milliGRAM(s) Oral daily  furosemide    Tablet 40 milliGRAM(s) Oral daily  gabapentin 800 milliGRAM(s) Oral three times a day  glucagon  Injectable 1 milliGRAM(s) IntraMuscular once  guaiFENesin  milliGRAM(s) Oral every 12 hours  insulin lispro (ADMELOG) corrective regimen sliding scale   SubCutaneous three times a day before meals  insulin lispro (ADMELOG) corrective regimen sliding scale   SubCutaneous at bedtime  metoprolol succinate  milliGRAM(s) Oral daily  montelukast 10 milliGRAM(s) Oral at bedtime  pravastatin 10 milliGRAM(s) Oral at bedtime  predniSONE   Tablet 40 milliGRAM(s) Oral daily  verapamil 120 milliGRAM(s) Oral two times a day  warfarin 5 milliGRAM(s) Oral at bedtime    MEDICATIONS  (PRN):  acetaminophen     Tablet .. 650 milliGRAM(s) Oral every 6 hours PRN Temp greater or equal to 38C (100.4F), Mild Pain (1 - 3)  ALBUTerol    90 MICROgram(s) HFA Inhaler 2 Puff(s) Inhalation every 6 hours PRN Bronchospasm  benzocaine 15 mG/menthol 3.6 mG Lozenge 1 Lozenge Oral every 4 hours PRN Sore Throat  cholestyramine Powder (Sugar-Free) 4 Gram(s) Oral daily PRN GI upset  dextrose Oral Gel 15 Gram(s) Oral once PRN Blood Glucose LESS THAN 70 milliGRAM(s)/deciliter      Allergies    penicillin (Hives)    Intolerances        Vital Signs Last 24 Hrs  T(C): 36.7 (04 Jun 2022 08:01), Max: 36.7 (04 Jun 2022 08:01)  T(F): 98 (04 Jun 2022 08:01), Max: 98 (04 Jun 2022 08:01)  HR: 97 (04 Jun 2022 08:50) (85 - 109)  BP: 147/88 (04 Jun 2022 08:01) (130/72 - 147/88)  BP(mean): --  RR: 18 (04 Jun 2022 08:01) (18 - 18)  SpO2: 97% (04 Jun 2022 08:01) (97% - 97%)    PHYSICAL EXAMINATION:    NECK:  Supple. No lymphadenopathy. Jugular venous pressure not elevated.    HEART:   The cardiac impulse has a normal quality. Reg., Nl S1 and S2.    CHEST:  Chest with exp wheezes. Normal respiratory effort.  ABDOMEN:  Soft and nontender.   EXTREMITIES:  There is no edema. Chronic venous stasis changes noted       LABS:          PT/INR - ( 04 Jun 2022 08:49 )   PT: 33.1 sec;   INR: 2.82 ratio         PTT - ( 04 Jun 2022 08:49 )  PTT:33.3 sec      RADIOLOGY & ADDITIONAL TESTS:    Assessment and Plan:   · Assessment  	  - has hMPV infection w diffuse bronchospasm; cont isolation  - bronchospasm noted-stable  - patient is steroid dependent; takes 10mg per day for legs  - cont Prednisone 40mg QD  - albuterol/ipratropium + budesonide nebs  -  off cefepime/zithro  - dvt proph    Problem/Plan - 1:  ·  Problem: Infection due to human metapneumovirus (hMPV).   Problem/Plan - 2:  ·  Problem: Pneumonia due to human metapneumovirus.   Problem/Plan - 3:  ·  Problem: Acute bronchospasm.   Problem/Plan - 4:  ·  Problem: Persistent cough.   Problem/Plan - 5:  ·  Problem: Bronchitis with acute wheezing.

## 2022-06-04 NOTE — PROGRESS NOTE ADULT - ASSESSMENT
72 y/o F with a PMHx significant for Atrial fibrillation on Coumadin, CHF (HFpEF), DM2 with peripheral neuropathy, peripheral venous insufficieny, morbid obesity, HTN, HLD presented to the [facility] [place] for further evaluation and management of c/o symptoms of sore throat, nasal congestion, cough and worsening shortness of breath over the past 4 days. Found to have acute hypoxic respiratory failure d/t hMPV with concurrent RLL PNA, bronchitis.     #Acute hypoxic respiratory failure 2/2 hMPV, RLL PNA  #Bronchitis   - found to have hMPV on 5/26, isolation precautions   - CT chest noting new RLL consolidation   - Pulmonary following, appreciate recs  - S/p Azithro x5 days  - S/p Cefepime for 7 days total  - C/w Prednisone 40mg daily, continue taper to patient's home dose of prednisone 10mg daily    - Continue albuterol/ipratropium + budesonide nebs  - albuterol standing q4  - mucinex BID, singular qHS  - previously on 2L NC now weaned to RA, breathing comfortably with O2 >95%    #Afib on warfarin   - On warfarin 5mg daily  - INR goal 2-3, today 2.05 (down from 2.71), will receive evening dose   - continue to monitor INR  - continue verapamil, metoprolol      #DM2  - A1C 5.8   - Elevated blood glucose iso steroids   - Continue BRENDA  - continue to monitor while decreasing steroids   - c/w gabapentin 800mg TID    #PVD with chronic venous stasis skin changes  - continue PO lasix 40mg daily  - compression stocking    #HTN   - continue verapamil, metoprolol      #HLD  - Ezetimibe, pravastatin daily    #Hx of cholecystectomy   - c/w cholestyramine     #DVT ppx   - On Warfarin     #Diet   - consistent carb     #Code  - Full    #Dispo  - Possible discharge tomorrow pending clinical improvement, pulm recs   - Rec'd for home with home PT

## 2022-06-05 LAB
ANION GAP SERPL CALC-SCNC: 5 MMOL/L — SIGNIFICANT CHANGE UP (ref 5–17)
BUN SERPL-MCNC: 29 MG/DL — HIGH (ref 7–23)
CALCIUM SERPL-MCNC: 8.7 MG/DL — SIGNIFICANT CHANGE UP (ref 8.5–10.1)
CHLORIDE SERPL-SCNC: 103 MMOL/L — SIGNIFICANT CHANGE UP (ref 96–108)
CO2 SERPL-SCNC: 30 MMOL/L — SIGNIFICANT CHANGE UP (ref 22–31)
CREAT SERPL-MCNC: 0.58 MG/DL — SIGNIFICANT CHANGE UP (ref 0.5–1.3)
EGFR: 95 ML/MIN/1.73M2 — SIGNIFICANT CHANGE UP
GLUCOSE SERPL-MCNC: 142 MG/DL — HIGH (ref 70–99)
HCT VFR BLD CALC: 45.1 % — HIGH (ref 34.5–45)
HGB BLD-MCNC: 14.8 G/DL — SIGNIFICANT CHANGE UP (ref 11.5–15.5)
INR BLD: 4.32 RATIO — HIGH (ref 0.88–1.16)
MCHC RBC-ENTMCNC: 31.1 PG — SIGNIFICANT CHANGE UP (ref 27–34)
MCHC RBC-ENTMCNC: 32.8 GM/DL — SIGNIFICANT CHANGE UP (ref 32–36)
MCV RBC AUTO: 94.7 FL — SIGNIFICANT CHANGE UP (ref 80–100)
PLATELET # BLD AUTO: 259 K/UL — SIGNIFICANT CHANGE UP (ref 150–400)
POTASSIUM SERPL-MCNC: 4.3 MMOL/L — SIGNIFICANT CHANGE UP (ref 3.5–5.3)
POTASSIUM SERPL-SCNC: 4.3 MMOL/L — SIGNIFICANT CHANGE UP (ref 3.5–5.3)
PROTHROM AB SERPL-ACNC: 50.9 SEC — HIGH (ref 10.5–13.4)
RBC # BLD: 4.76 M/UL — SIGNIFICANT CHANGE UP (ref 3.8–5.2)
RBC # FLD: 13.3 % — SIGNIFICANT CHANGE UP (ref 10.3–14.5)
SODIUM SERPL-SCNC: 138 MMOL/L — SIGNIFICANT CHANGE UP (ref 135–145)
WBC # BLD: 15.66 K/UL — HIGH (ref 3.8–10.5)
WBC # FLD AUTO: 15.66 K/UL — HIGH (ref 3.8–10.5)

## 2022-06-05 PROCEDURE — 99497 ADVNCD CARE PLAN 30 MIN: CPT

## 2022-06-05 PROCEDURE — 99232 SBSQ HOSP IP/OBS MODERATE 35: CPT

## 2022-06-05 PROCEDURE — 99233 SBSQ HOSP IP/OBS HIGH 50: CPT

## 2022-06-05 RX ORDER — ZOLPIDEM TARTRATE 10 MG/1
5 TABLET ORAL AT BEDTIME
Refills: 0 | Status: DISCONTINUED | OUTPATIENT
Start: 2022-06-05 | End: 2022-06-07

## 2022-06-05 RX ORDER — WARFARIN SODIUM 2.5 MG/1
5 TABLET ORAL DAILY
Refills: 0 | Status: DISCONTINUED | OUTPATIENT
Start: 2022-06-05 | End: 2022-06-06

## 2022-06-05 RX ORDER — TRIAMCINOLONE 4 MG
60 TABLET ORAL ONCE
Refills: 0 | Status: COMPLETED | OUTPATIENT
Start: 2022-06-05 | End: 2022-06-05

## 2022-06-05 RX ADMIN — GABAPENTIN 800 MILLIGRAM(S): 400 CAPSULE ORAL at 13:31

## 2022-06-05 RX ADMIN — Medication 60 MILLIGRAM(S): at 17:11

## 2022-06-05 RX ADMIN — Medication 3: at 17:12

## 2022-06-05 RX ADMIN — Medication 40 MILLIGRAM(S): at 09:55

## 2022-06-05 RX ADMIN — GABAPENTIN 800 MILLIGRAM(S): 400 CAPSULE ORAL at 21:42

## 2022-06-05 RX ADMIN — Medication 3 MILLILITER(S): at 02:44

## 2022-06-05 RX ADMIN — BENZOCAINE AND MENTHOL 1 LOZENGE: 5; 1 LIQUID ORAL at 21:41

## 2022-06-05 RX ADMIN — Medication 600 MILLIGRAM(S): at 09:53

## 2022-06-05 RX ADMIN — Medication 0.5 MILLIGRAM(S): at 20:49

## 2022-06-05 RX ADMIN — MONTELUKAST 10 MILLIGRAM(S): 4 TABLET, CHEWABLE ORAL at 21:42

## 2022-06-05 RX ADMIN — Medication 650 MILLIGRAM(S): at 03:35

## 2022-06-05 RX ADMIN — Medication 650 MILLIGRAM(S): at 04:05

## 2022-06-05 RX ADMIN — Medication 1000 UNIT(S): at 09:53

## 2022-06-05 RX ADMIN — Medication 600 MILLIGRAM(S): at 21:42

## 2022-06-05 RX ADMIN — CHOLESTYRAMINE 4 GRAM(S): 4 POWDER, FOR SUSPENSION ORAL at 08:07

## 2022-06-05 RX ADMIN — Medication 3 MILLILITER(S): at 15:20

## 2022-06-05 RX ADMIN — Medication 3 MILLILITER(S): at 08:38

## 2022-06-05 RX ADMIN — GABAPENTIN 800 MILLIGRAM(S): 400 CAPSULE ORAL at 05:26

## 2022-06-05 RX ADMIN — Medication 2: at 21:41

## 2022-06-05 RX ADMIN — Medication 10 MILLIGRAM(S): at 21:42

## 2022-06-05 RX ADMIN — Medication 100 MILLIGRAM(S): at 09:53

## 2022-06-05 RX ADMIN — Medication 40 MILLIGRAM(S): at 09:53

## 2022-06-05 RX ADMIN — Medication 120 MILLIGRAM(S): at 21:42

## 2022-06-05 RX ADMIN — EZETIMIBE 10 MILLIGRAM(S): 10 TABLET ORAL at 21:42

## 2022-06-05 RX ADMIN — Medication 1: at 12:51

## 2022-06-05 RX ADMIN — Medication 3 MILLILITER(S): at 20:49

## 2022-06-05 RX ADMIN — ZOLPIDEM TARTRATE 5 MILLIGRAM(S): 10 TABLET ORAL at 23:03

## 2022-06-05 RX ADMIN — Medication 120 MILLIGRAM(S): at 09:54

## 2022-06-05 RX ADMIN — BENZOCAINE AND MENTHOL 1 LOZENGE: 5; 1 LIQUID ORAL at 05:26

## 2022-06-05 NOTE — PROGRESS NOTE ADULT - SUBJECTIVE AND OBJECTIVE BOX
Subjective:    Awake, alert. Improved with decreased cough.    MEDICATIONS  (STANDING):  ALBUTerol    90 MICROgram(s) HFA Inhaler 2 Puff(s) Inhalation every 4 hours  albuterol/ipratropium for Nebulization 3 milliLiter(s) Nebulizer every 6 hours  buDESOnide    Inhalation Suspension 0.5 milliGRAM(s) Inhalation every 12 hours  cholecalciferol 1000 Unit(s) Oral daily  cholestyramine Powder (Sugar-Free) 4 Gram(s) Oral daily  dextrose 5%. 1000 milliLiter(s) (100 mL/Hr) IV Continuous <Continuous>  dextrose 5%. 1000 milliLiter(s) (50 mL/Hr) IV Continuous <Continuous>  dextrose 50% Injectable 25 Gram(s) IV Push once  dextrose 50% Injectable 12.5 Gram(s) IV Push once  dextrose 50% Injectable 25 Gram(s) IV Push once  ezetimibe 10 milliGRAM(s) Oral daily  furosemide    Tablet 40 milliGRAM(s) Oral daily  gabapentin 800 milliGRAM(s) Oral three times a day  glucagon  Injectable 1 milliGRAM(s) IntraMuscular once  guaiFENesin  milliGRAM(s) Oral every 12 hours  insulin lispro (ADMELOG) corrective regimen sliding scale   SubCutaneous three times a day before meals  insulin lispro (ADMELOG) corrective regimen sliding scale   SubCutaneous at bedtime  metoprolol succinate  milliGRAM(s) Oral daily  montelukast 10 milliGRAM(s) Oral at bedtime  pravastatin 10 milliGRAM(s) Oral at bedtime  predniSONE   Tablet 40 milliGRAM(s) Oral daily  verapamil 120 milliGRAM(s) Oral two times a day  warfarin 5 milliGRAM(s) Oral daily    MEDICATIONS  (PRN):  acetaminophen     Tablet .. 650 milliGRAM(s) Oral every 6 hours PRN Temp greater or equal to 38C (100.4F), Mild Pain (1 - 3)  ALBUTerol    90 MICROgram(s) HFA Inhaler 2 Puff(s) Inhalation every 6 hours PRN Bronchospasm  benzocaine 15 mG/menthol 3.6 mG Lozenge 1 Lozenge Oral every 4 hours PRN Sore Throat  cholestyramine Powder (Sugar-Free) 4 Gram(s) Oral daily PRN GI upset  dextrose Oral Gel 15 Gram(s) Oral once PRN Blood Glucose LESS THAN 70 milliGRAM(s)/deciliter      Allergies    penicillin (Hives)    Intolerances        Vital Signs Last 24 Hrs  T(C): 36.4 (05 Jun 2022 08:16), Max: 36.4 (05 Jun 2022 08:16)  T(F): 97.5 (05 Jun 2022 08:16), Max: 97.5 (05 Jun 2022 08:16)  HR: 81 (05 Jun 2022 08:16) (81 - 101)  BP: 133/72 (05 Jun 2022 08:16) (118/83 - 144/79)  BP(mean): --  RR: 18 (05 Jun 2022 08:16) (18 - 18)  SpO2: 97% (05 Jun 2022 08:16) (92% - 97%)    PHYSICAL EXAMINATION:    NECK:  Supple. No lymphadenopathy. Jugular venous pressure not elevated.    HEART:   The cardiac impulse has a normal quality. Reg., Nl S1 and S2.    CHEST:  Chest with decreased wheezing. Normal respiratory effort.  ABDOMEN:  Soft and nontender.   EXTREMITIES:  There is no edema.       LABS:                        14.8   15.66 )-----------( 259      ( 05 Jun 2022 07:37 )             45.1     06-05    138  |  103  |  29<H>  ----------------------------<  142<H>  4.3   |  30  |  0.58    Ca    8.7      05 Jun 2022 07:37      PT/INR - ( 05 Jun 2022 12:15 )   PT: 50.9 sec;   INR: 4.32 ratio         PTT - ( 04 Jun 2022 08:49 )  PTT:33.3 sec      RADIOLOGY & ADDITIONAL TESTS:    Assessment and Plan:   · Assessment  	  - has hMPV infection w diffuse bronchospasm; cont isolation  - bronchospasm noted-stable  - patient is steroid dependent; takes 10mg per day for legs  - cont Prednisone 40mg QD  - albuterol/ipratropium + budesonide nebs  - off cefepime/zithro  - dvt proph  - Kenalog 60mg IM x 1 today    Problem/Plan - 1:  ·  Problem: Infection due to human metapneumovirus (hMPV).   Problem/Plan - 2:  ·  Problem: Pneumonia due to human metapneumovirus.   Problem/Plan - 3:  ·  Problem: Acute bronchospasm.   Problem/Plan - 4:  ·  Problem: Persistent cough.   Problem/Plan - 5:  ·  Problem: Bronchitis with acute wheezing.

## 2022-06-05 NOTE — PROGRESS NOTE ADULT - SUBJECTIVE AND OBJECTIVE BOX
HOSPITALIST ATTENDING PROGRESS NOTE    Chart and meds reviewed.  Patient seen and examined.    Subjective: Patient still feels a little tight, still coughing. No chest pain. No fever.    All other systems reviewed and found to be negative with the exception of what has been described above.    MEDICATIONS  (STANDING):  ALBUTerol    90 MICROgram(s) HFA Inhaler 2 Puff(s) Inhalation every 4 hours  albuterol/ipratropium for Nebulization 3 milliLiter(s) Nebulizer every 6 hours  buDESOnide    Inhalation Suspension 0.5 milliGRAM(s) Inhalation every 12 hours  cholecalciferol 1000 Unit(s) Oral daily  cholestyramine Powder (Sugar-Free) 4 Gram(s) Oral daily  dextrose 5%. 1000 milliLiter(s) (50 mL/Hr) IV Continuous <Continuous>  dextrose 5%. 1000 milliLiter(s) (100 mL/Hr) IV Continuous <Continuous>  dextrose 50% Injectable 25 Gram(s) IV Push once  dextrose 50% Injectable 12.5 Gram(s) IV Push once  dextrose 50% Injectable 25 Gram(s) IV Push once  ezetimibe 10 milliGRAM(s) Oral daily  furosemide    Tablet 40 milliGRAM(s) Oral daily  gabapentin 800 milliGRAM(s) Oral three times a day  glucagon  Injectable 1 milliGRAM(s) IntraMuscular once  guaiFENesin  milliGRAM(s) Oral every 12 hours  insulin lispro (ADMELOG) corrective regimen sliding scale   SubCutaneous three times a day before meals  insulin lispro (ADMELOG) corrective regimen sliding scale   SubCutaneous at bedtime  metoprolol succinate  milliGRAM(s) Oral daily  montelukast 10 milliGRAM(s) Oral at bedtime  pravastatin 10 milliGRAM(s) Oral at bedtime  predniSONE   Tablet 40 milliGRAM(s) Oral daily  verapamil 120 milliGRAM(s) Oral two times a day  warfarin 5 milliGRAM(s) Oral at bedtime    MEDICATIONS  (PRN):  acetaminophen     Tablet .. 650 milliGRAM(s) Oral every 6 hours PRN Temp greater or equal to 38C (100.4F), Mild Pain (1 - 3)  ALBUTerol    90 MICROgram(s) HFA Inhaler 2 Puff(s) Inhalation every 6 hours PRN Bronchospasm  benzocaine 15 mG/menthol 3.6 mG Lozenge 1 Lozenge Oral every 4 hours PRN Sore Throat  cholestyramine Powder (Sugar-Free) 4 Gram(s) Oral daily PRN GI upset  dextrose Oral Gel 15 Gram(s) Oral once PRN Blood Glucose LESS THAN 70 milliGRAM(s)/deciliter      Vital Signs Last 24 Hrs  T(C): 36.4 (05 Jun 2022 08:16), Max: 36.4 (05 Jun 2022 08:16)  T(F): 97.5 (05 Jun 2022 08:16), Max: 97.5 (05 Jun 2022 08:16)  HR: 81 (05 Jun 2022 08:16) (81 - 101)  BP: 133/72 (05 Jun 2022 08:16) (118/83 - 144/79)  BP(mean): --  RR: 18 (05 Jun 2022 08:16) (18 - 18)  SpO2: 97% (05 Jun 2022 08:16) (92% - 97%)    CAPILLARY BLOOD GLUCOSE      POCT Blood Glucose.: 119 mg/dL (04 Jun 2022 08:10)  POCT Blood Glucose.: 194 mg/dL (03 Jun 2022 21:11)  POCT Blood Glucose.: 398 mg/dL (03 Jun 2022 17:29)  POCT Blood Glucose.: 211 mg/dL (03 Jun 2022 12:17)      PHYSICAL EXAM:  GEN: Obese  HEENT:  pupils equal and reactive, EOMI, no oropharyngeal lesions, erythema, exudates, oral thrush  NECK:   supple, no carotid bruits, no palpable lymph nodes, no thyromegaly  CV:  +S1, +S2, regular, no murmurs or rubs  RESP:  Bilateral wheeze  BREAST:  not examined  GI:  abdomen soft, non-tender, non-distended, normal BS, no bruits, no abdominal masses, no palpable masses  RECTAL:  not examined  :  not examined  MSK:   normal muscle tone, no atrophy, no rigidity, no contractions  EXT:  no clubbing, no cyanosis, no edema, no calf pain, swelling or erythema  VASCULAR:  pulses equal and symmetric in the upper and lower extremities  NEURO:  AAOX3, no focal neurological deficits, follows all commands, able to move extremities spontaneously  SKIN:  no ulcers, lesions or rashes    LABS:    PT/INR - ( 04 Jun 2022 08:49 )   PT: 33.1 sec;   INR: 2.82 ratio     PTT - ( 04 Jun 2022 08:49 )  PTT:33.3 sec     CT Chest No Cont (05.26.22 @ 15:38) >  IMPRESSION:    New findings in the right lower lobe and left upper lobe since June 2019,   favored to be infection. 3 month follow-up is recommended.

## 2022-06-05 NOTE — PROGRESS NOTE ADULT - ASSESSMENT
72 y/o F with a PMHx significant for Atrial fibrillation on Coumadin, CHF (HFpEF), DM2 with peripheral neuropathy, peripheral venous insufficieny, morbid obesity, HTN, HLD presented to the [facility] for further evaluation and management of c/o symptoms of sore throat, nasal congestion, cough and worsening shortness of breath over the past 4 days. Found to have acute hypoxic respiratory failure d/t hMPV with concurrent RLL PNA, bronchitis.     #Acute hypoxic respiratory failure 2/2 hMPV, RLL PNA  #Bronchitis   - found to have hMPV on 5/26, isolation precautions   - CT chest noting new RLL consolidation   - Pulmonary following, appreciate recs  - S/p Azithro x5 days  - S/p Cefepime for 7 days total  - C/w Prednisone 40mg daily, continue taper to patient's home dose of prednisone 10mg daily    - Continue albuterol/ipratropium + budesonide nebs  - albuterol standing q4  - mucinex BID, singular qHS  - previously on 2L NC now weaned to RA, breathing comfortably with O2 >95%    #Afib on warfarin   - On warfarin 5mg daily  - INR goal 2-3,   - continue to monitor INR  - continue verapamil, metoprolol      #DM2  - A1C 5.8   - Elevated blood glucose iso steroids   - Continue BRENDA  - continue to monitor while decreasing steroids   - c/w gabapentin 800mg TID    #PVD with chronic venous stasis skin changes  - continue PO lasix 40mg daily  - compression stocking    #HTN   - continue verapamil, metoprolol      #HLD  - Ezetimibe, pravastatin daily    #Hx of cholecystectomy   - c/w cholestyramine     #DVT ppx   - On Warfarin     #Diet   - consistent carb     #Code  - Full    #Dispo  - Possible discharge tomorrow pending clinical improvement, pulm recs   - Rec'd for home with home PT       GOC and advance care planning meeting done with the patient. She wishes to be fully resuscitated and mechanically ventilated if need arises. There are no limitations of any sort in her medical care and management. She is FULL CODE. Additional time spent 19 min.

## 2022-06-06 ENCOUNTER — TRANSCRIPTION ENCOUNTER (OUTPATIENT)
Age: 72
End: 2022-06-06

## 2022-06-06 LAB
INR BLD: 4.02 RATIO — HIGH (ref 0.88–1.16)
PROTHROM AB SERPL-ACNC: 47.3 SEC — HIGH (ref 10.5–13.4)

## 2022-06-06 PROCEDURE — 99232 SBSQ HOSP IP/OBS MODERATE 35: CPT

## 2022-06-06 PROCEDURE — 99233 SBSQ HOSP IP/OBS HIGH 50: CPT

## 2022-06-06 RX ORDER — WARFARIN SODIUM 2.5 MG/1
4 TABLET ORAL DAILY
Refills: 0 | Status: DISCONTINUED | OUTPATIENT
Start: 2022-06-07 | End: 2022-06-07

## 2022-06-06 RX ADMIN — Medication 0.5 MILLIGRAM(S): at 20:17

## 2022-06-06 RX ADMIN — Medication 40 MILLIGRAM(S): at 09:34

## 2022-06-06 RX ADMIN — Medication 3 MILLILITER(S): at 02:14

## 2022-06-06 RX ADMIN — Medication 40 MILLIGRAM(S): at 09:33

## 2022-06-06 RX ADMIN — ZOLPIDEM TARTRATE 5 MILLIGRAM(S): 10 TABLET ORAL at 22:52

## 2022-06-06 RX ADMIN — GABAPENTIN 800 MILLIGRAM(S): 400 CAPSULE ORAL at 06:08

## 2022-06-06 RX ADMIN — Medication 3 MILLILITER(S): at 09:59

## 2022-06-06 RX ADMIN — GABAPENTIN 800 MILLIGRAM(S): 400 CAPSULE ORAL at 20:58

## 2022-06-06 RX ADMIN — Medication 100 MILLIGRAM(S): at 09:34

## 2022-06-06 RX ADMIN — Medication 120 MILLIGRAM(S): at 09:33

## 2022-06-06 RX ADMIN — Medication 2: at 12:19

## 2022-06-06 RX ADMIN — Medication 650 MILLIGRAM(S): at 16:06

## 2022-06-06 RX ADMIN — Medication 2: at 08:12

## 2022-06-06 RX ADMIN — EZETIMIBE 10 MILLIGRAM(S): 10 TABLET ORAL at 20:58

## 2022-06-06 RX ADMIN — Medication 600 MILLIGRAM(S): at 20:58

## 2022-06-06 RX ADMIN — Medication 2: at 20:57

## 2022-06-06 RX ADMIN — Medication 600 MILLIGRAM(S): at 09:33

## 2022-06-06 RX ADMIN — CHOLESTYRAMINE 4 GRAM(S): 4 POWDER, FOR SUSPENSION ORAL at 08:12

## 2022-06-06 RX ADMIN — Medication 10 MILLIGRAM(S): at 20:58

## 2022-06-06 RX ADMIN — Medication 3 MILLILITER(S): at 14:07

## 2022-06-06 RX ADMIN — MONTELUKAST 10 MILLIGRAM(S): 4 TABLET, CHEWABLE ORAL at 20:58

## 2022-06-06 RX ADMIN — Medication 4: at 17:19

## 2022-06-06 RX ADMIN — Medication 3 MILLILITER(S): at 20:16

## 2022-06-06 RX ADMIN — GABAPENTIN 800 MILLIGRAM(S): 400 CAPSULE ORAL at 14:36

## 2022-06-06 RX ADMIN — Medication 120 MILLIGRAM(S): at 20:58

## 2022-06-06 RX ADMIN — Medication 1000 UNIT(S): at 09:33

## 2022-06-06 RX ADMIN — Medication 0.5 MILLIGRAM(S): at 10:01

## 2022-06-06 NOTE — PROGRESS NOTE ADULT - SUBJECTIVE AND OBJECTIVE BOX
Subjective:  less sob  some cough    MEDICATIONS  (STANDING):  ALBUTerol    90 MICROgram(s) HFA Inhaler 2 Puff(s) Inhalation every 4 hours  albuterol/ipratropium for Nebulization 3 milliLiter(s) Nebulizer every 6 hours  buDESOnide    Inhalation Suspension 0.5 milliGRAM(s) Inhalation every 12 hours  cholecalciferol 1000 Unit(s) Oral daily  cholestyramine Powder (Sugar-Free) 4 Gram(s) Oral daily  dextrose 5%. 1000 milliLiter(s) (100 mL/Hr) IV Continuous <Continuous>  dextrose 5%. 1000 milliLiter(s) (50 mL/Hr) IV Continuous <Continuous>  dextrose 50% Injectable 25 Gram(s) IV Push once  dextrose 50% Injectable 12.5 Gram(s) IV Push once  dextrose 50% Injectable 25 Gram(s) IV Push once  ezetimibe 10 milliGRAM(s) Oral daily  furosemide    Tablet 40 milliGRAM(s) Oral daily  gabapentin 800 milliGRAM(s) Oral three times a day  glucagon  Injectable 1 milliGRAM(s) IntraMuscular once  guaiFENesin  milliGRAM(s) Oral every 12 hours  insulin lispro (ADMELOG) corrective regimen sliding scale   SubCutaneous three times a day before meals  insulin lispro (ADMELOG) corrective regimen sliding scale   SubCutaneous at bedtime  metoprolol succinate  milliGRAM(s) Oral daily  montelukast 10 milliGRAM(s) Oral at bedtime  pravastatin 10 milliGRAM(s) Oral at bedtime  predniSONE   Tablet 40 milliGRAM(s) Oral daily  verapamil 120 milliGRAM(s) Oral two times a day  warfarin 5 milliGRAM(s) Oral daily    MEDICATIONS  (PRN):  acetaminophen     Tablet .. 650 milliGRAM(s) Oral every 6 hours PRN Temp greater or equal to 38C (100.4F), Mild Pain (1 - 3)  ALBUTerol    90 MICROgram(s) HFA Inhaler 2 Puff(s) Inhalation every 6 hours PRN Bronchospasm  benzocaine 15 mG/menthol 3.6 mG Lozenge 1 Lozenge Oral every 4 hours PRN Sore Throat  cholestyramine Powder (Sugar-Free) 4 Gram(s) Oral daily PRN GI upset  dextrose Oral Gel 15 Gram(s) Oral once PRN Blood Glucose LESS THAN 70 milliGRAM(s)/deciliter  zolpidem 5 milliGRAM(s) Oral at bedtime PRN Insomnia  zolpidem 5 milliGRAM(s) Oral at bedtime PRN Insomnia      Allergies    penicillin (Hives)    Intolerances        REVIEW OF SYSTEMS:    CONSTITUTIONAL:  As per HPI.  HEENT:  Eyes:  No diplopia or blurred vision. ENT:  No earache, sore throat or runny nose.  CARDIOVASCULAR:  No pressure, squeezing, tightness, heaviness or aching about the chest, neck, axilla or epigastrium.  RESPIRATORY:  No cough, shortness of breath, PND or orthopnea.  GASTROINTESTINAL:  No nausea, vomiting or diarrhea.  GENITOURINARY:  No dysuria, frequency or urgency.  MUSCULOSKELETAL:  no joint pain, deformity, tenderness  EXTREMITIES: no clubbing cyanosis,edema  SKIN:  No change in skin, hair or nails.  NEUROLOGIC:  No paresthesias, fasciculations, seizures or weakness.  PSYCHIATRIC:  No disorder of thought or mood.  ENDOCRINE:  No heat or cold intolerance, polyuria or polydipsia.  HEMATOLOGICAL:  No easy bruising or bleedings:    Vital Signs Last 24 Hrs  T(C): 36.4 (06 Jun 2022 08:33), Max: 37.2 (05 Jun 2022 16:59)  T(F): 97.6 (06 Jun 2022 08:33), Max: 98.9 (05 Jun 2022 16:59)  HR: 89 (06 Jun 2022 08:33) (67 - 95)  BP: 153/84 (06 Jun 2022 08:33) (120/82 - 153/84)  BP(mean): --  RR: 18 (06 Jun 2022 08:33) (18 - 18)  SpO2: 95% (06 Jun 2022 08:33) (94% - 99%)    PHYSICAL EXAMINATION:  SKIN: no rashes  HEAD: NC/AT  EYES: PERRLA, EOMI  EARS: TM's intact  NOSE: no abnormalities  NECK:  Supple. No lymphadenopathy. Jugular venous pressure not elevated. Carotids equal.   HEART:   The cardiac impulse has a normal quality. Reg., Nl S1 and S2.  There are no murmurs, rubs or gallops noted  CHEST:  scattered expiratory ronchi  ABDOMEN:  Soft and nontender.   EXTREMITIES:  no C/C/E  NEURO: AAO x 3, no focal deficts       LABS:                        14.8   15.66 )-----------( 259      ( 05 Jun 2022 07:37 )             45.1     06-05    138  |  103  |  29<H>  ----------------------------<  142<H>  4.3   |  30  |  0.58    Ca    8.7      05 Jun 2022 07:37      PT/INR - ( 06 Jun 2022 08:30 )   PT: 47.3 sec;   INR: 4.02 ratio               RADIOLOGY & ADDITIONAL TESTS:

## 2022-06-06 NOTE — PROGRESS NOTE ADULT - SUBJECTIVE AND OBJECTIVE BOX
Chief Complaint: Congestion, SOB, cough       Interval events:   - No acute events overnight, VSS with O2 95% on RA  - Pt still with cough, chest tightness and wheezing. Denies any chest pain, SOB or fever   - INR noted to be 4.08 today, will hold evening warfarin     ROS:   Patient denies any current chest pain, SOB, f/c/n/v/d, abd pain, myalgias, dysuria, HA, dizziness  All other review of systems is negative unless indicated above    Physical Exam:  Vital Signs Last 24 Hrs  T(C): 36.4 (06 Jun 2022 08:33), Max: 37.2 (05 Jun 2022 16:59)  T(F): 97.6 (06 Jun 2022 08:33), Max: 98.9 (05 Jun 2022 16:59)  HR: 89 (06 Jun 2022 08:33) (67 - 95)  BP: 153/84 (06 Jun 2022 08:33) (120/82 - 153/84)  BP(mean): --  RR: 18 (06 Jun 2022 08:33) (18 - 18)  SpO2: 95% (06 Jun 2022 08:33) (94% - 99%)    Constitutional: NAD, awake and alert, obese female  HEENT: PERRLA, EOMI, MMM  Respiratory: +rhonchi/mild wheezing bilaterally   Cardiovascular: S1 and S2, RRR, no murmurs, gallops or rubs  Gastrointestinal: +BS, soft, non-tender, non-distended, no CVA tenderness  Extremities: 1+ pitting edema b/l LEs, +DP pulses b/l  Neurological: A&O x 3, no focal deficits  Musculoskeletal: 5/5 strength b/l upper and lower extremities  Skin: LE chronic venous stasis changes, skin warm and dry    Labs:  No BMP/CBC within the past 24 hours    PT/INR - ( 06 Jun 2022 08:30 )   PT: 47.3 sec;   INR: 4.02 ratio      Micro:  BCx 5/26 -- NGTD  Respiratory viral panel 5/26 -- positive hMPV    Radiology:  Xray Chest 1 View- PORTABLE-Urgent (05.26.22)  IMPRESSION: Slightly prominent bronchovascular markings in the right   middle versus lower lobe, without definite evidence of lobar   consolidation and may be accentuated by summation density arising from   the adjacent rib cage and overlying right breast shadow. If there are   abnormal breath sounds on auscultation in this region, evolving pneumonia   would then be considered. The remaining lungs are clear.    CT Chest No Cont (05.26.22)  IMPRESSION:    New findings in the right lower lobe and left upper lobe since June 2019,   favored to be infection. 3 month follow-up is recommended.    Medications:  MEDICATIONS  (STANDING):  ALBUTerol    90 MICROgram(s) HFA Inhaler 2 Puff(s) Inhalation every 4 hours  albuterol/ipratropium for Nebulization 3 milliLiter(s) Nebulizer every 6 hours  buDESOnide    Inhalation Suspension 0.5 milliGRAM(s) Inhalation every 12 hours  cholecalciferol 1000 Unit(s) Oral daily  cholestyramine Powder (Sugar-Free) 4 Gram(s) Oral daily  dextrose 5%. 1000 milliLiter(s) (50 mL/Hr) IV Continuous <Continuous>  dextrose 5%. 1000 milliLiter(s) (100 mL/Hr) IV Continuous <Continuous>  dextrose 50% Injectable 25 Gram(s) IV Push once  dextrose 50% Injectable 12.5 Gram(s) IV Push once  dextrose 50% Injectable 25 Gram(s) IV Push once  ezetimibe 10 milliGRAM(s) Oral daily  furosemide    Tablet 40 milliGRAM(s) Oral daily  gabapentin 800 milliGRAM(s) Oral three times a day  glucagon  Injectable 1 milliGRAM(s) IntraMuscular once  guaiFENesin  milliGRAM(s) Oral every 12 hours  insulin lispro (ADMELOG) corrective regimen sliding scale   SubCutaneous three times a day before meals  insulin lispro (ADMELOG) corrective regimen sliding scale   SubCutaneous at bedtime  metoprolol succinate  milliGRAM(s) Oral daily  montelukast 10 milliGRAM(s) Oral at bedtime  pravastatin 10 milliGRAM(s) Oral at bedtime  predniSONE   Tablet 40 milliGRAM(s) Oral daily  verapamil 120 milliGRAM(s) Oral two times a day  warfarin 5 milliGRAM(s) Oral daily    MEDICATIONS  (PRN):  acetaminophen     Tablet .. 650 milliGRAM(s) Oral every 6 hours PRN Temp greater or equal to 38C (100.4F), Mild Pain (1 - 3)  ALBUTerol    90 MICROgram(s) HFA Inhaler 2 Puff(s) Inhalation every 6 hours PRN Bronchospasm  benzocaine 15 mG/menthol 3.6 mG Lozenge 1 Lozenge Oral every 4 hours PRN Sore Throat  cholestyramine Powder (Sugar-Free) 4 Gram(s) Oral daily PRN GI upset  dextrose Oral Gel 15 Gram(s) Oral once PRN Blood Glucose LESS THAN 70 milliGRAM(s)/deciliter  zolpidem 5 milliGRAM(s) Oral at bedtime PRN Insomnia  zolpidem 5 milliGRAM(s) Oral at bedtime PRN Insomnia

## 2022-06-06 NOTE — PROGRESS NOTE ADULT - ASSESSMENT
- has hMPV infection w diffuse bronchospasm; cont isolation  - CT scan shows RLL infiltrate w mucoid impaction  - patient is steroid dependent  - on prednisone  - albuterol/ipratropium + budesonide nebs  - has focal RLL infiltrate. Rx as per ID  - consider discharge  - dvt proph

## 2022-06-06 NOTE — PROGRESS NOTE ADULT - ASSESSMENT
70 y/o F with a PMHx significant for Atrial fibrillation on Coumadin, CHF (HFpEF), DM2 with peripheral neuropathy, peripheral venous insufficieny, morbid obesity, HTN, HLD presented to the [facility] for further evaluation and management of c/o symptoms of sore throat, nasal congestion, cough and worsening shortness of breath over the past 4 days. Found to have acute hypoxic respiratory failure d/t hMPV with concurrent RLL PNA, bronchitis.     #Acute hypoxic respiratory failure 2/2 hMPV, RLL PNA  #Bronchitis   - found to have hMPV on 5/26, isolation precautions   - CT chest noting new RLL consolidation   - Pulmonary following, appreciate recs  - S/p Azithro x5 days, S/p Cefepime for 7 days total  - C/w Prednisone 40mg daily, continue taper to patient's home dose of prednisone 10mg daily    - Continue albuterol/ipratropium + budesonide nebs  - albuterol standing q4  - mucinex BID, singular qHS  - previously on 2L NC now weaned to RA, breathing comfortably with O2 >95%    #Afib on warfarin   #Supratherapeutic INR  - On warfarin 5mg daily  - INR goal 2-3, today 4.08 (down from 4.32), will hold evening dose   - continue to monitor INR  - continue verapamil, metoprolol      #DM2  - A1C 5.8   - Elevated blood glucose iso steroids   - Continue BRENDA  - continue to monitor while decreasing steroids   - c/w gabapentin 800mg TID    #PVD with chronic venous stasis skin changes  - continue PO lasix 40mg daily  - compression stocking    #HTN   - continue verapamil, metoprolol      #HLD  - Ezetimibe, pravastatin daily    #Hx of cholecystectomy   - c/w cholestyramine     #DVT ppx   - On Warfarin (will hold next dose)    #Diet   - consistent carb     #Code  - Full    #Dispo  - Possible discharge home with home PT tomorrow, pending clinical improvement 72 y/o F with a PMHx significant for Atrial fibrillation on Coumadin, CHF (HFpEF), DM2 with peripheral neuropathy, peripheral venous insufficieny, morbid obesity, HTN, HLD presented to the [facility] for further evaluation and management of c/o symptoms of sore throat, nasal congestion, cough and worsening shortness of breath over the past 4 days. Found to have acute hypoxic respiratory failure d/t hMPV with concurrent RLL PNA, bronchitis.     #Acute hypoxic respiratory failure 2/2 hMPV, RLL PNA  #Bronchitis   - found to have hMPV on 5/26, isolation precautions   - CT chest noting new RLL consolidation   - Pulmonary following, appreciate recs  - S/p Azithro x5 days, S/p Cefepime for 7 days total  - C/w Prednisone 40mg daily, continue taper to patient's home dose of prednisone 10mg daily    - Continue albuterol/ipratropium + budesonide nebs  - albuterol standing q4  - mucinex BID, singular qHS  - previously on 2L NC now weaned to RA, breathing comfortably with O2 >95%    #Afib on warfarin   #Supratherapeutic INR  - On warfarin 5mg daily  - INR goal 2-3, today 4.08 (down from 4.32), will hold evening dose   - continue to monitor INR  - continue verapamil, metoprolol      #DM2  - A1C 5.8   - Elevated blood glucose iso steroids   - Continue BRENDA  - continue to monitor while decreasing steroids   - c/w gabapentin 800mg TID    #PVD with chronic venous stasis skin changes  - continue PO lasix 40mg daily  - compression stocking    #HTN   - continue verapamil, metoprolol      #HLD  - Ezetimibe, pravastatin daily    #Hx of cholecystectomy   - c/w cholestyramine     #DVT ppx   - On Warfarin (will hold next dose)    #Diet   - consistent carb     #Code  - Full    #Dispo  - Possible discharge home with home PT tomorrow, pending clinical improvement      Attending note:  pt seen and examined  had choking episode today with oatmeal breakfast  was having cough and still has wheezing  pt not medically ready for discharge today  cont oral prednisone  monitor for any new fever     if stays stable, improving respiratory wise, with no new fever; d/c home on 6/7.  72 y/o F with a PMHx significant for Atrial fibrillation on Coumadin, CHF (HFpEF), DM2 with peripheral neuropathy, peripheral venous insufficieny, morbid obesity, HTN, HLD presented to the [facility] for further evaluation and management of c/o symptoms of sore throat, nasal congestion, cough and worsening shortness of breath over the past 4 days. Found to have acute hypoxic respiratory failure d/t hMPV with concurrent RLL PNA, bronchitis.     #Acute hypoxic respiratory failure 2/2 hMPV, RLL PNA  #Bronchitis   - found to have hMPV on 5/26, isolation precautions   - CT chest noting new RLL consolidation   - Pulmonary following, appreciate recs  - S/p Azithro x5 days, S/p Cefepime for 7 days total  - C/w Prednisone 40mg daily, continue taper to patient's home dose of prednisone 10mg daily    - Continue albuterol/ipratropium + budesonide nebs  - albuterol standing q4  - mucinex BID, singular qHS  - previously on 2L NC now weaned to RA, breathing comfortably with O2 >95%    #Afib on warfarin   #Supratherapeutic INR  - On warfarin 5mg daily  - INR goal 2-3, today 4.08 (down from 4.32), will hold evening dose   - continue to monitor INR  - continue verapamil, metoprolol      #DM2  - A1C 5.8   - Elevated blood glucose iso steroids   - Continue BRENDA  - continue to monitor while decreasing steroids   - c/w gabapentin 800mg TID    #PVD with chronic venous stasis skin changes  - continue PO lasix 40mg daily  - compression stocking    #HTN   - continue verapamil, metoprolol      #HLD  - Ezetimibe, pravastatin daily    #Hx of cholecystectomy   - c/w cholestyramine     #DVT ppx   - On Warfarin (will hold next dose)    #Diet   - consistent carb     #Code  - Full    #Dispo  - Possible discharge home with home PT tomorrow, pending clinical improvement      Attending note:  pt seen and examined  had choking episode today with oatmeal breakfast  was having cough and still has wheezing  pt not medically ready for discharge today  cont oral prednisone  monitor for any new fever   INR 4.08; hold coumadin today also; once less than 3, start at a lower dose of 4 mg daily.     if stays stable, improving respiratory wise, with no new fever; d/c home on 6/7.

## 2022-06-06 NOTE — DISCHARGE NOTE NURSING/CASE MANAGEMENT/SOCIAL WORK - PATIENT PORTAL LINK FT
You can access the FollowMyHealth Patient Portal offered by [facility] by registering at the following website: [email or web address]. By joining [employer]’s FollowMyHealth portal, you will also be able to view your health information using other applications (apps) compatible with our system.

## 2022-06-07 ENCOUNTER — TRANSCRIPTION ENCOUNTER (OUTPATIENT)
Age: 72
End: 2022-06-07

## 2022-06-07 VITALS
HEART RATE: 91 BPM | SYSTOLIC BLOOD PRESSURE: 140 MMHG | TEMPERATURE: 98 F | RESPIRATION RATE: 18 BRPM | OXYGEN SATURATION: 95 % | DIASTOLIC BLOOD PRESSURE: 94 MMHG

## 2022-06-07 LAB
INR BLD: 2.94 RATIO — HIGH (ref 0.88–1.16)
PROTHROM AB SERPL-ACNC: 34.5 SEC — HIGH (ref 10.5–13.4)

## 2022-06-07 PROCEDURE — 99232 SBSQ HOSP IP/OBS MODERATE 35: CPT

## 2022-06-07 PROCEDURE — 99239 HOSP IP/OBS DSCHRG MGMT >30: CPT

## 2022-06-07 PROCEDURE — 71045 X-RAY EXAM CHEST 1 VIEW: CPT | Mod: 26

## 2022-06-07 RX ADMIN — Medication 120 MILLIGRAM(S): at 11:09

## 2022-06-07 RX ADMIN — Medication 3 MILLILITER(S): at 09:26

## 2022-06-07 RX ADMIN — Medication 3 MILLILITER(S): at 02:12

## 2022-06-07 RX ADMIN — CHOLESTYRAMINE 4 GRAM(S): 4 POWDER, FOR SUSPENSION ORAL at 09:26

## 2022-06-07 RX ADMIN — Medication 1000 UNIT(S): at 11:11

## 2022-06-07 RX ADMIN — Medication 0.5 MILLIGRAM(S): at 09:27

## 2022-06-07 RX ADMIN — GABAPENTIN 800 MILLIGRAM(S): 400 CAPSULE ORAL at 06:23

## 2022-06-07 RX ADMIN — Medication 600 MILLIGRAM(S): at 11:11

## 2022-06-07 RX ADMIN — Medication 40 MILLIGRAM(S): at 11:10

## 2022-06-07 RX ADMIN — Medication 100 MILLIGRAM(S): at 11:10

## 2022-06-07 NOTE — PROGRESS NOTE ADULT - PROBLEM SELECTOR PROBLEM 5
Bronchitis with acute wheezing

## 2022-06-07 NOTE — PROGRESS NOTE ADULT - PROBLEM SELECTOR PROBLEM 2
Pneumonia due to human metapneumovirus

## 2022-06-07 NOTE — PROGRESS NOTE ADULT - ASSESSMENT
- has hMPV infection w diffuse bronchospasm; cont isolation  - CT scan shows RLL infiltrate w mucoid impaction  - patient is steroid dependent - 10 mg daily  - on prednisone 40 mg  - albuterol/ipratropium + budesonide nebs  - has focal RLL infiltrate. off abx  - repeat xcr  - dvt proph

## 2022-06-07 NOTE — DISCHARGE NOTE PROVIDER - CARE PROVIDER_API CALL
Justo Mayer  CARDIOVASCULAR DISEASE  [address], [address]  [place] [address]  Phone: [phone]  Fax: [phone]  Established Patient  Follow Up Time: 1 week    Delbert Martinez)  Internal Medicine; Pulmonary Disease  [address], [facility] [address]  [place] [address]  Phone: [phone]  Fax: [phone]  Established Patient  Follow Up Time: 1 week

## 2022-06-07 NOTE — PROGRESS NOTE ADULT - REASON FOR ADMISSION
Congestion and difficulty breathing

## 2022-06-07 NOTE — PROGRESS NOTE ADULT - PROBLEM SELECTOR PROBLEM 4
Persistent cough

## 2022-06-07 NOTE — PROGRESS NOTE ADULT - PROVIDER SPECIALTY LIST ADULT
Pulmonology
Pulmonology
Hospitalist
Pulmonology
Hospitalist
Pulmonology

## 2022-06-07 NOTE — DISCHARGE NOTE PROVIDER - NSDCCAREPROVSEEN_GEN_ALL_CORE_FT
Delbert Martinez (Pulmonologist)  Justo Mayer (Primary care physician/cardiologist)  Dionne Elizalde (Hospitalist)   Yony Villegas (Hospitalist PA)

## 2022-06-07 NOTE — PROGRESS NOTE ADULT - SUBJECTIVE AND OBJECTIVE BOX
Subjective:  feeling better  no distress    MEDICATIONS  (STANDING):  ALBUTerol    90 MICROgram(s) HFA Inhaler 2 Puff(s) Inhalation every 4 hours  albuterol/ipratropium for Nebulization 3 milliLiter(s) Nebulizer every 6 hours  buDESOnide    Inhalation Suspension 0.5 milliGRAM(s) Inhalation every 12 hours  cholecalciferol 1000 Unit(s) Oral daily  cholestyramine Powder (Sugar-Free) 4 Gram(s) Oral daily  dextrose 5%. 1000 milliLiter(s) (50 mL/Hr) IV Continuous <Continuous>  dextrose 5%. 1000 milliLiter(s) (100 mL/Hr) IV Continuous <Continuous>  dextrose 50% Injectable 25 Gram(s) IV Push once  dextrose 50% Injectable 12.5 Gram(s) IV Push once  dextrose 50% Injectable 25 Gram(s) IV Push once  ezetimibe 10 milliGRAM(s) Oral daily  furosemide    Tablet 40 milliGRAM(s) Oral daily  gabapentin 800 milliGRAM(s) Oral three times a day  glucagon  Injectable 1 milliGRAM(s) IntraMuscular once  guaiFENesin  milliGRAM(s) Oral every 12 hours  insulin lispro (ADMELOG) corrective regimen sliding scale   SubCutaneous three times a day before meals  insulin lispro (ADMELOG) corrective regimen sliding scale   SubCutaneous at bedtime  metoprolol succinate  milliGRAM(s) Oral daily  montelukast 10 milliGRAM(s) Oral at bedtime  pravastatin 10 milliGRAM(s) Oral at bedtime  predniSONE   Tablet 40 milliGRAM(s) Oral daily  verapamil 120 milliGRAM(s) Oral two times a day  warfarin 4 milliGRAM(s) Oral daily    MEDICATIONS  (PRN):  acetaminophen     Tablet .. 650 milliGRAM(s) Oral every 6 hours PRN Temp greater or equal to 38C (100.4F), Mild Pain (1 - 3)  ALBUTerol    90 MICROgram(s) HFA Inhaler 2 Puff(s) Inhalation every 6 hours PRN Bronchospasm  benzocaine 15 mG/menthol 3.6 mG Lozenge 1 Lozenge Oral every 4 hours PRN Sore Throat  cholestyramine Powder (Sugar-Free) 4 Gram(s) Oral daily PRN GI upset  dextrose Oral Gel 15 Gram(s) Oral once PRN Blood Glucose LESS THAN 70 milliGRAM(s)/deciliter  zolpidem 5 milliGRAM(s) Oral at bedtime PRN Insomnia  zolpidem 5 milliGRAM(s) Oral at bedtime PRN Insomnia      Allergies    penicillin (Hives)    Intolerances        REVIEW OF SYSTEMS:    CONSTITUTIONAL:  As per HPI.  HEENT:  Eyes:  No diplopia or blurred vision. ENT:  No earache, sore throat or runny nose.  CARDIOVASCULAR:  No pressure, squeezing, tightness, heaviness or aching about the chest, neck, axilla or epigastrium.  RESPIRATORY:  No cough, shortness of breath, PND or orthopnea.  GASTROINTESTINAL:  No nausea, vomiting or diarrhea.  GENITOURINARY:  No dysuria, frequency or urgency.  MUSCULOSKELETAL:  no joint pain, deformity, tenderness  EXTREMITIES: no clubbing cyanosis,edema  SKIN:  No change in skin, hair or nails.  NEUROLOGIC:  No paresthesias, fasciculations, seizures or weakness.  PSYCHIATRIC:  No disorder of thought or mood.  ENDOCRINE:  No heat or cold intolerance, polyuria or polydipsia.  HEMATOLOGICAL:  No easy bruising or bleedings:    Vital Signs Last 24 Hrs  T(C): 36.7 (06 Jun 2022 20:52), Max: 36.7 (06 Jun 2022 20:52)  T(F): 98 (06 Jun 2022 20:52), Max: 98 (06 Jun 2022 20:52)  HR: 66 (07 Jun 2022 02:14) (66 - 107)  BP: 136/69 (06 Jun 2022 20:52) (115/57 - 136/69)  BP(mean): --  RR: 18 (06 Jun 2022 20:52) (18 - 18)  SpO2: 98% (07 Jun 2022 02:14) (95% - 99%)    PHYSICAL EXAMINATION:  SKIN: no rashes  HEAD: NC/AT  EYES: PERRLA, EOMI  EARS: TM's intact  NOSE: no abnormalities  NECK:  Supple. No lymphadenopathy. Jugular venous pressure not elevated. Carotids equal.   HEART:   The cardiac impulse has a normal quality. Reg., Nl S1 and S2.  There are no murmurs, rubs or gallops noted  CHEST:  scattered ronchi  ABDOMEN:  Soft and nontender.   EXTREMITIES:  no C/C/E  NEURO: AAO x 3, no focal deficts       LABS:          PT/INR - ( 06 Jun 2022 08:30 )   PT: 47.3 sec;   INR: 4.02 ratio               RADIOLOGY & ADDITIONAL TESTS:

## 2022-06-07 NOTE — DISCHARGE NOTE PROVIDER - NSDCMRMEDTOKEN_GEN_ALL_CORE_FT
albuterol 90 mcg/inh inhalation aerosol: 2 puff(s) inhaled every 4 hours, As Needed -for shortness of breath and/or wheezing   cholestyramine 4 g/5 g oral powder for reconstitution: 1  orally once a day  cholestyramine 4 g/5 g oral powder for reconstitution: 1  orally once a day, As Needed  ***1 packet is standing, 2nd dose as needed***  ezetimibe 10 mg oral tablet: 1 tab(s) orally once a day (in the evening)  gabapentin 800 mg oral tablet: 1 tab(s) orally 3 times a day  ipratropium-albuterol 0.5 mg-2.5 mg/3 mL inhalation solution: 3 milliliter(s) inhaled every 6 hours, As Needed -for shortness of breath and/or wheezing   Lasix 40 mg oral tablet: 2 tab(s) orally 2 times a day, As Needed  metFORMIN 1000 mg oral tablet: 1 tab(s) orally 2 times a day  Metoprolol Succinate  mg oral tablet, extended release: 1 tab(s) orally once a day  montelukast 10 mg oral tablet: 1 tab(s) orally once a day (at bedtime)  pravastatin 10 mg oral tablet: 1 tab(s) orally once a day (in the evening)  predniSONE 10 mg oral tablet: 1 tab(s) orally once a day  predniSONE 10 mg oral tablet: 3 tab(s) orally once a day x 3 days  2 tab(s) orally once a day x 3 days  1 tab(s) orally once a day thereafter (normal dose)   Symbicort 160 mcg-4.5 mcg/inh inhalation aerosol: 2 puff(s) inhaled 2 times a day   verapamil 120 mg oral tablet: 1 tab(s) orally 2 times a day  warfarin 5 mg oral tablet: 1 tab(s) orally once a day (in the evening)  zolpidem 10 mg oral tablet: 1 tab(s) orally once a day (at bedtime)

## 2022-06-07 NOTE — PROGRESS NOTE ADULT - PROBLEM SELECTOR PROBLEM 1
Infection due to human metapneumovirus (hMPV)

## 2022-06-07 NOTE — DISCHARGE NOTE PROVIDER - HOSPITAL COURSE
Hospital Discharge Summary     Primary discharge diagnosis: Acute hypoxic respiratory failure 2/2 hMPV, RLL PNA c/b bronchitis     ROS:   Patient denies any current chest pain, SOB, f/c/n/v/d, abd pain, LE edema, myalgias, dysuria, HA, dizziness    Physical Exam:  Vital Signs Last 24 Hrs  T(C): 36.4 (07 Jun 2022 09:59), Max: 36.7 (06 Jun 2022 20:52)  T(F): 97.5 (07 Jun 2022 09:59), Max: 98 (06 Jun 2022 20:52)  HR: 91 (07 Jun 2022 09:59) (66 - 107)  BP: 140/94 (07 Jun 2022 09:59) (115/57 - 140/94)  BP(mean): --  RR: 18 (07 Jun 2022 09:59) (18 - 18)  SpO2: 95% (07 Jun 2022 09:59) (95% - 99%)    Constitutional: NAD, awake and alert, obese   HEENT: PERRLA, EOMI, MMM  Respiratory: +mild rhonchi b/l, normal effort on RA  Cardiovascular: S1 and S2, RRR, no murmurs, gallops or rubs  Gastrointestinal: +BS, soft, non-tender, non-distended, no CVA tenderness  Extremities: mild pitting edema b/l LEs, +DP pulses b/l  Neurological: A&O x 3, no focal deficits  Musculoskeletal: 5/5 strength b/l upper and lower extremities  Skin: LE chronic venous stasis changes, skin warm and dry    Recent Labs:  POCT Blood Glucose.: 136 mg/dL (06.07.22 @ 12:08)     PT/INR - ( 07 Jun 2022 09:29 )   PT: 34.5 sec;   INR: 2.94 ratio      Micro:  BCx 5/26 -- NGTD  Respiratory viral panel 5/26 -- positive hMPV    Radiology:  Xray Chest 1 View- PORTABLE-Urgent (05.26.22)  IMPRESSION: Slightly prominent bronchovascular markings in the right   middle versus lower lobe, without definite evidence of lobar   consolidation and may be accentuated by summation density arising from   the adjacent rib cage and overlying right breast shadow. If there are   abnormal breath sounds on auscultation in this region, evolving pneumonia   would then be considered. The remaining lungs are clear.    CT Chest No Cont (05.26.22)  IMPRESSION:    New findings in the right lower lobe and left upper lobe since June 2019,   favored to be infection. 3 month follow-up is recommended.      Assessment/Plan:  72 y/o F with a PMHx significant for Atrial fibrillation on Coumadin, CHF (HFpEF), DM2 with peripheral neuropathy, peripheral venous insufficieny, morbid obesity, HTN, HLD presented to the [facility] for further evaluation and management of c/o symptoms of sore throat, nasal congestion, cough and worsening shortness of breath over the past 4 days. Found to have acute hypoxic respiratory failure d/t hMPV with concurrent RLL PNA, bronchitis.     #Acute hypoxic respiratory failure 2/2 hMPV, RLL PNA  #Bronchitis   - found to have hMPV on 5/26  - CT chest noting new RLL consolidation   - Pulmonary consulted, follow up outpatient   - S/p Azithro x5 days, S/p Cefepime for 7 days total  - C/w Prednisone taper, will start 30mg tomorrow and taper to patient's home dose of prednisone 10mg daily    - Continue albuterol/ipratropium nebs   - Albuterol inhaler PRN  - S/p budesonide nebs, will start Symbicort on discharge   - mucinex BID, singular qHS  - previously on 2L NC now weaned to RA, breathing comfortably with O2 >95%    #Afib on warfarin   - On warfarin 5mg daily  - INR goal 2-3, today 2.94   - continue to monitor INR outpatient   - continue verapamil, metoprolol      #DM2  - A1C 5.8   - Elevated blood glucose iso steroids   - received sliding scale insuline while admitted   - resume home metformin on discharge  - c/w gabapentin 800mg TID    #PVD with chronic venous stasis skin changes  - continue PO lasix 40mg daily  - compression stockings    #HTN   - continue verapamil, metoprolol      #HLD  - Ezetimibe, pravastatin daily      #Dispo: discharge to home with home PT in stable condition    Final diagnosis, treatment plan, and follow-up recommendations were discussed and explained to the patient. The patient was given an opportunity to ask questions concerning the diagnosis and treatment plan. The patient acknowledged understanding of the diagnosis, treatment, and follow-up recommendations. The patient was advised to seek urgent care upon discharge if worsening symptoms develop prior to scheduled follow-up. Time spent on discharge included time with the patient, and also coordinating discharge care as outlined below.    Total time spent: 50 min

## 2022-06-07 NOTE — DISCHARGE NOTE PROVIDER - PROVIDER TOKENS
PROVIDER:[TOKEN:[[id number]],FOLLOWUP:[1 week],ESTABLISHEDPATIENT:[T]],PROVIDER:[TOKEN:[[id number]],FOLLOWUP:[1 week],ESTABLISHEDPATIENT:[T]]

## 2022-06-07 NOTE — PROGRESS NOTE ADULT - PROBLEM SELECTOR PROBLEM 3
Acute bronchospasm

## 2022-06-07 NOTE — DISCHARGE NOTE PROVIDER - NSDCCPCAREPLAN_GEN_ALL_CORE_FT
PRINCIPAL DISCHARGE DIAGNOSIS  Diagnosis: Pneumonia  Assessment and Plan of Treatment: WHAT IS PNEUMONIA? Pneumonia (PNA) is a lung infection caused by bacteria which makes your lungs inflamed.  THINGS TO DO: (1) Rest as needed (2) Do not smoke – smoking increases your risk for pneumonia (3) Prevent the spread of germs – wash your hands often and cover your mouth when you cough (4) Ask about vaccines with your primary care provider  MONITOR THESE SIGNS AND SYMPTOMS: (1) Worsening confusion (2) Worsening/trouble breathing (3) Fever > 100.4. If you experience any of these, DO alert your primary care provider, or return to the Emergency Department if you feel very sick.      SECONDARY DISCHARGE DIAGNOSES  Diagnosis: JETT (dyspnea on exertion)  Assessment and Plan of Treatment:     Diagnosis: Infection due to human metapneumovirus (hMPV)  Assessment and Plan of Treatment: You were found to have human metapneumovirus on a nasal swab, which was likely a cause of the respiratory symptoms you were experiencing. You have already completed the necessary isolation period for this virus.    Diagnosis: Fever  Assessment and Plan of Treatment:     Diagnosis: Bronchitis  Assessment and Plan of Treatment: Continue prednisone 30mg x3 days (starting 6/8)  Prednisone 20mg x3 days   Then resume your regular dose of prednisone 10mg daily

## 2022-06-13 DIAGNOSIS — E11.51 TYPE 2 DIABETES MELLITUS WITH DIABETIC PERIPHERAL ANGIOPATHY WITHOUT GANGRENE: ICD-10-CM

## 2022-06-13 DIAGNOSIS — E78.5 HYPERLIPIDEMIA, UNSPECIFIED: ICD-10-CM

## 2022-06-13 DIAGNOSIS — R00.0 TACHYCARDIA, UNSPECIFIED: ICD-10-CM

## 2022-06-13 DIAGNOSIS — I50.30 UNSPECIFIED DIASTOLIC (CONGESTIVE) HEART FAILURE: ICD-10-CM

## 2022-06-13 DIAGNOSIS — I11.0 HYPERTENSIVE HEART DISEASE WITH HEART FAILURE: ICD-10-CM

## 2022-06-13 DIAGNOSIS — I48.20 CHRONIC ATRIAL FIBRILLATION, UNSPECIFIED: ICD-10-CM

## 2022-06-13 DIAGNOSIS — J96.01 ACUTE RESPIRATORY FAILURE WITH HYPOXIA: ICD-10-CM

## 2022-06-13 DIAGNOSIS — J12.3 HUMAN METAPNEUMOVIRUS PNEUMONIA: ICD-10-CM

## 2022-06-13 DIAGNOSIS — E66.01 MORBID (SEVERE) OBESITY DUE TO EXCESS CALORIES: ICD-10-CM

## 2022-06-13 DIAGNOSIS — Z79.84 LONG TERM (CURRENT) USE OF ORAL HYPOGLYCEMIC DRUGS: ICD-10-CM

## 2022-06-13 DIAGNOSIS — Z88.0 ALLERGY STATUS TO PENICILLIN: ICD-10-CM

## 2022-06-13 DIAGNOSIS — I87.2 VENOUS INSUFFICIENCY (CHRONIC) (PERIPHERAL): ICD-10-CM

## 2022-06-13 DIAGNOSIS — Z90.49 ACQUIRED ABSENCE OF OTHER SPECIFIED PARTS OF DIGESTIVE TRACT: ICD-10-CM

## 2022-06-13 DIAGNOSIS — J40 BRONCHITIS, NOT SPECIFIED AS ACUTE OR CHRONIC: ICD-10-CM

## 2022-06-13 DIAGNOSIS — E11.42 TYPE 2 DIABETES MELLITUS WITH DIABETIC POLYNEUROPATHY: ICD-10-CM

## 2022-06-15 RX ORDER — POLYETHYLENE GLYCOL 3350, SODIUM CHLORIDE, SODIUM BICARBONATE AND POTASSIUM CHLORIDE WITH LEMON FLAVOR 420; 11.2; 5.72; 1.48 G/4L; G/4L; G/4L; G/4L
420 POWDER, FOR SOLUTION ORAL
Qty: 1 | Refills: 0 | Status: COMPLETED | COMMUNITY
Start: 2019-10-25 | End: 2022-06-15

## 2022-06-15 RX ORDER — ERTAPENEM SODIUM 1 G/1
1 INJECTION, POWDER, LYOPHILIZED, FOR SOLUTION INTRAMUSCULAR; INTRAVENOUS
Qty: 5 | Refills: 0 | Status: DISCONTINUED | COMMUNITY
Start: 2019-09-01 | End: 2022-06-15

## 2022-06-15 RX ORDER — METFORMIN HYDROCHLORIDE 625 MG/1
TABLET ORAL
Refills: 0 | Status: DISCONTINUED | COMMUNITY
End: 2022-06-15

## 2022-06-15 RX ORDER — DULOXETINE HYDROCHLORIDE 30 MG/1
30 CAPSULE, DELAYED RELEASE PELLETS ORAL
Qty: 30 | Refills: 2 | Status: COMPLETED | COMMUNITY
Start: 2021-08-06 | End: 2022-06-15

## 2022-06-15 RX ORDER — ONDANSETRON 8 MG/1
8 TABLET, ORALLY DISINTEGRATING ORAL 3 TIMES DAILY
Qty: 60 | Refills: 2 | Status: COMPLETED | COMMUNITY
Start: 2019-11-12 | End: 2022-06-15

## 2022-06-15 RX ORDER — KETOROLAC TROMETHAMINE 5 MG/ML
0.5 SOLUTION OPHTHALMIC
Qty: 5 | Refills: 0 | Status: DISCONTINUED | COMMUNITY
Start: 2020-12-15 | End: 2022-06-15

## 2022-06-15 RX ORDER — DILTIAZEM HYDROCHLORIDE 90 MG/1
TABLET ORAL
Refills: 0 | Status: DISCONTINUED | COMMUNITY
End: 2022-06-15

## 2022-06-15 RX ORDER — PREDNISOLONE ACETATE 10 MG/ML
1 SUSPENSION/ DROPS OPHTHALMIC
Qty: 5 | Refills: 0 | Status: DISCONTINUED | COMMUNITY
Start: 2020-12-15 | End: 2022-06-15

## 2022-06-15 RX ORDER — ALBUTEROL SULFATE 90 UG/1
108 (90 BASE) AEROSOL, METERED RESPIRATORY (INHALATION)
Refills: 0 | Status: ACTIVE | COMMUNITY
Start: 2022-06-15

## 2022-06-15 RX ORDER — MUPIROCIN 20 MG/G
2 OINTMENT TOPICAL
Qty: 22 | Refills: 0 | Status: DISCONTINUED | COMMUNITY
Start: 2020-11-10 | End: 2022-06-15

## 2022-06-15 RX ORDER — OXYCODONE 5 MG/1
5 TABLET ORAL
Qty: 14 | Refills: 0 | Status: DISCONTINUED | COMMUNITY
Start: 2020-02-06 | End: 2022-06-15

## 2022-06-15 RX ORDER — METHYLPREDNISOLONE 4 MG/1
4 TABLET ORAL
Qty: 1 | Refills: 2 | Status: COMPLETED | COMMUNITY
Start: 2021-12-17 | End: 2022-06-15

## 2022-06-15 RX ORDER — WARFARIN 2.5 MG/1
2.5 TABLET ORAL
Qty: 30 | Refills: 0 | Status: DISCONTINUED | COMMUNITY
Start: 2020-01-20 | End: 2022-06-15

## 2022-06-15 RX ORDER — METFORMIN HYDROCHLORIDE 1000 MG/1
1000 TABLET, COATED ORAL TWICE DAILY
Refills: 0 | Status: ACTIVE | COMMUNITY
Start: 2022-06-15

## 2022-06-15 RX ORDER — DICYCLOMINE HYDROCHLORIDE 20 MG/1
20 TABLET ORAL 3 TIMES DAILY
Qty: 90 | Refills: 2 | Status: COMPLETED | COMMUNITY
Start: 2018-05-08 | End: 2022-06-15

## 2022-06-15 RX ORDER — MONTELUKAST 10 MG/1
10 TABLET, FILM COATED ORAL
Refills: 0 | Status: ACTIVE | COMMUNITY

## 2022-06-15 RX ORDER — PREDNISONE 10 MG/1
10 TABLET ORAL DAILY
Qty: 30 | Refills: 2 | Status: COMPLETED | COMMUNITY
Start: 2021-03-12 | End: 2022-06-15

## 2022-06-15 RX ORDER — CLINDAMYCIN HYDROCHLORIDE 300 MG/1
300 CAPSULE ORAL
Qty: 20 | Refills: 0 | Status: COMPLETED | COMMUNITY
Start: 2021-04-05 | End: 2022-06-15

## 2022-06-15 RX ORDER — DIPHENOXYLATE HYDROCHLORIDE AND ATROPINE SULFATE 2.5; .025 MG/1; MG/1
2.5-0.025 TABLET ORAL
Qty: 90 | Refills: 0 | Status: COMPLETED | COMMUNITY
Start: 2019-11-12 | End: 2022-06-15

## 2022-06-16 ENCOUNTER — APPOINTMENT (OUTPATIENT)
Dept: CARE COORDINATION | Facility: HOME HEALTH | Age: 72
End: 2022-06-16
Payer: MEDICARE

## 2022-06-16 VITALS
OXYGEN SATURATION: 97 % | HEART RATE: 88 BPM | RESPIRATION RATE: 18 BRPM | DIASTOLIC BLOOD PRESSURE: 90 MMHG | SYSTOLIC BLOOD PRESSURE: 144 MMHG

## 2022-06-16 DIAGNOSIS — E78.5 HYPERLIPIDEMIA, UNSPECIFIED: ICD-10-CM

## 2022-06-16 DIAGNOSIS — J18.9 PNEUMONIA, UNSPECIFIED ORGANISM: ICD-10-CM

## 2022-06-16 PROCEDURE — 99495 TRANSJ CARE MGMT MOD F2F 14D: CPT

## 2022-06-16 RX ORDER — FUROSEMIDE 80 MG/1
80 TABLET ORAL TWICE DAILY
Refills: 0 | Status: DISCONTINUED | COMMUNITY
Start: 2018-09-28 | End: 2022-06-16

## 2022-06-16 RX ORDER — PRAVASTATIN SODIUM 10 MG/1
10 TABLET ORAL
Refills: 0 | Status: ACTIVE | COMMUNITY
Start: 2018-06-05

## 2022-06-16 RX ORDER — VERAPAMIL HYDROCHLORIDE 120 MG/1
120 TABLET ORAL
Qty: 180 | Refills: 0 | Status: DISCONTINUED | COMMUNITY
Start: 2021-12-17

## 2022-06-16 RX ORDER — BUDESONIDE AND FORMOTEROL FUMARATE DIHYDRATE 160; 4.5 UG/1; UG/1
160-4.5 AEROSOL RESPIRATORY (INHALATION) TWICE DAILY
Qty: 1 | Refills: 0 | Status: DISCONTINUED | COMMUNITY
Start: 2022-06-15 | End: 2022-06-16

## 2022-06-16 RX ORDER — IPRATROPIUM BROMIDE AND ALBUTEROL SULFATE 2.5; .5 MG/3ML; MG/3ML
0.5-2.5 (3) SOLUTION RESPIRATORY (INHALATION) EVERY 6 HOURS
Refills: 0 | Status: DISCONTINUED | COMMUNITY
Start: 2022-06-15 | End: 2022-06-16

## 2022-06-16 RX ORDER — BUDESONIDE 9 MG/1
9 TABLET, EXTENDED RELEASE ORAL DAILY
Qty: 90 | Refills: 3 | Status: COMPLETED | COMMUNITY
Start: 2019-10-15 | End: 2022-06-16

## 2022-06-16 NOTE — HISTORY OF PRESENT ILLNESS
[Post-hospitalization from ___ Hospital] : Post-hospitalization from [unfilled] Hospital [Admitted on: ___] : The patient was admitted on [unfilled] [Discharged on ___] : discharged on [unfilled] [Discharge Summary] : discharge summary [Discharge Med List] : discharge medication list [Med Reconciliation] : medication reconciliation has been completed [Patient Contacted By: ____] : and contacted by [unfilled] [FreeTextEntry2] : FROM [facility] DC NOTE PROVIDER\par  Discharge Note Provider [Charted Location: [facility] [address] [address]] [Authored: 07-Jun-2022 12:18]- for Visit: [id number], Complete, Not Revised, Signed in Full, Available to Patient\par \par \par  Hospital Course:\par Discharge Date	07-Jun-2022\par Admission Date	26-May-2022 17:01\par Reason for Admission	Congestion and difficulty breathing\par Hospital Course	\par Hospital Discharge Summary \par \par Primary discharge diagnosis: Acute hypoxic respiratory failure 2/2 hMPV, RLL PNA c/b bronchitis \par \par ROS: \par Patient denies any current chest pain, SOB, f/c/n/v/d, abd pain, LE edema, myalgias, dysuria, HA, dizziness\par \par Physical Exam:\par Vital Signs Last 24 Hrs\par T(C): 36.4 (07 Jun 2022 09:59), Max: 36.7 (06 Jun 2022 20:52)\par T(F): 97.5 (07 Jun 2022 09:59), Max: 98 (06 Jun 2022 20:52)\par HR: 91 (07 Jun 2022 09:59) (66 - 107)\par BP: 140/94 (07 Jun 2022 09:59) (115/57 - 140/94)\par BP(mean): --\par RR: 18 (07 Jun 2022 09:59) (18 - 18)\par SpO2: 95% (07 Jun 2022 09:59) (95% - 99%)\par \par Constitutional: NAD, awake and alert, obese \par HEENT: PERRLA, EOMI, MMM\par Respiratory: +mild rhonchi b/l, normal effort on RA\par Cardiovascular: S1 and S2, RRR, no murmurs, gallops or rubs\par Gastrointestinal: +BS, soft, non-tender, non-distended, no CVA tenderness\par Extremities: mild pitting edema b/l LEs, +DP pulses b/l\par Neurological: A&O x 3, no focal deficits\par Musculoskeletal: 5/5 strength b/l upper and lower extremities\par Skin: LE chronic venous stasis changes, skin warm and dry\par \par Recent Labs:\par POCT Blood Glucose.: 136 mg/dL (06.07.22 @ 12:08) \par \par PT/INR - ( 07 Jun 2022 09:29 )   PT: 34.5 sec;   INR: 2.94 ratio  \par \par Micro:\par BCx 5/26 -- NGTD\par Respiratory viral panel 5/26 -- positive hMPV\par \par Radiology:\par Xray Chest 1 View- PORTABLE-Urgent (05.26.22)\par IMPRESSION: Slightly prominent bronchovascular markings in the right \par middle versus lower lobe, without definite evidence of lobar \par consolidation and may be accentuated by summation density arising from \par the adjacent rib cage and overlying right breast shadow. If there are \par abnormal breath sounds on auscultation in this region, evolving pneumonia \par would then be considered. The remaining lungs are clear.\par \par CT Chest No Cont (05.26.22)\par IMPRESSION:\par \par New findings in the right lower lobe and left upper lobe since June 2019, \par favored to be infection. 3 month follow-up is recommended.\par \par \par Assessment/Plan:\par 70 y/o F with a PMHx significant for Atrial fibrillation on Coumadin, CHF (HFpEF), DM2 with peripheral neuropathy, peripheral venous insufficieny, morbid obesity, HTN, HLD presented to the [facility] for further evaluation and management of c/o symptoms of sore throat, nasal congestion, cough and worsening shortness of breath over the past 4 days. Found to have acute hypoxic respiratory failure d/t hMPV with concurrent RLL PNA, bronchitis. \par \par #Acute hypoxic respiratory failure 2/2 hMPV, RLL PNA\par #Bronchitis \par - found to have hMPV on 5/26\par - CT chest noting new RLL consolidation \par - Pulmonary consulted, follow up outpatient \par - S/p Azithro x5 days, S/p Cefepime for 7 days total\par - C/w Prednisone taper, will start 30mg tomorrow and taper to patient's home dose of prednisone 10mg daily  \par - Continue albuterol/ipratropium nebs \par - Albuterol inhaler PRN\par - S/p budesonide nebs, will start Symbicort on discharge \par - mucinex BID, singular qHS\par - previously on 2L NC now weaned to RA, breathing comfortably with O2 >95%\par \par #Afib on warfarin \par - On warfarin 5mg daily\par - INR goal 2-3, today 2.94 \par - continue to monitor INR outpatient \par - continue verapamil, metoprolol  \par \par #DM2\par - A1C 5.8 \par - Elevated blood glucose iso steroids \par - received sliding scale insuline while admitted \par - resume home metformin on discharge\par - c/w gabapentin 800mg TID\par \par #PVD with chronic venous stasis skin changes\par - continue PO lasix 40mg daily\par - compression stockings\par \par #HTN \par - continue verapamil, metoprolol  \par \par #HLD\par - Ezetimibe, pravastatin daily\par \par \par #Dispo: discharge to home with home PT in stable condition\par \par Final diagnosis, treatment plan, and follow-up recommendations were discussed and explained to the patient. The patient was given an opportunity to ask questions concerning the diagnosis and treatment plan. The patient acknowledged understanding of the diagnosis, treatment, and follow-up recommendations. The patient was advised to seek urgent care upon discharge if worsening symptoms develop prior to scheduled follow-up. Time spent on discharge included time with the patient, and also coordinating discharge care as outlined below.\par \par Total time spent: 50 min\par \par  Med Reconciliation:\par Medication Reconciliation Status	Admission Reconciliation is Completed\par Discharge Reconciliation is Completed\par Discharge Medications	albuterol 90 mcg/inh inhalation aerosol: 2 puff(s) inhaled every 4 hours, As Needed -for shortness of breath and/or wheezing \par cholestyramine 4 g/5 g oral powder for reconstitution: 1  orally once a day\par cholestyramine 4 g/5 g oral powder for reconstitution: 1  orally once a day, As Needed\par ***1 packet is standing, 2nd dose as needed***\par ezetimibe 10 mg oral tablet: 1 tab(s) orally once a day (in the evening)\par gabapentin 800 mg oral tablet: 1 tab(s) orally 3 times a day\par ipratropium-albuterol 0.5 mg-2.5 mg/3 mL inhalation solution: 3 milliliter(s) inhaled every 6 hours, As Needed -for shortness of breath and/or wheezing \par Lasix 40 mg oral tablet: 2 tab(s) orally 2 times a day, As Needed\par metFORMIN 1000 mg oral tablet: 1 tab(s) orally 2 times a day\par Metoprolol Succinate  mg oral tablet, extended release: 1 tab(s) orally once a day\par montelukast 10 mg oral tablet: 1 tab(s) orally once a day (at bedtime)\par pravastatin 10 mg oral tablet: 1 tab(s) orally once a day (in the evening)\par predniSONE 10 mg oral tablet: 1 tab(s) orally once a day\par predniSONE 10 mg oral tablet: 3 tab(s) orally once a day x 3 days\par 2 tab(s) orally once a day x 3 days\par 1 tab(s) orally once a day thereafter (normal dose) \par Symbicort 160 mcg-4.5 mcg/inh inhalation aerosol: 2 puff(s) inhaled 2 times a day \par verapamil 120 mg oral tablet: 1 tab(s) orally 2 times a day\par warfarin 5 mg oral tablet: 1 tab(s) orally once a day (in the evening)\par zolpidem 10 mg oral tablet: 1 tab(s) orally once a day (at bedtime)\par .\par ,\par ,\par \par  Care Plan/Procedures:\par Discharge Diagnoses, Assessment and Plan of Treatment	PRINCIPAL DISCHARGE DIAGNOSIS\par Diagnosis: Pneumonia\par Assessment and Plan of Treatment: WHAT IS PNEUMONIA? Pneumonia (PNA) is a lung infection caused by bacteria which makes your lungs inflamed.\par THINGS TO DO: (1) Rest as needed (2) Do not smoke – smoking increases your risk for pneumonia (3) Prevent the spread of germs – wash your hands often and cover your mouth when you cough (4) Ask about vaccines with your primary care provider\par MONITOR THESE SIGNS AND SYMPTOMS: (1) Worsening confusion (2) Worsening/trouble breathing (3) Fever > 100.4. If you experience any of these, DO alert your primary care provider, or return to the Emergency Department if you feel very sick.\par \par \par SECONDARY DISCHARGE DIAGNOSES\par Diagnosis: JETT (dyspnea on exertion)\par Assessment and Plan of Treatment: \par \par Diagnosis: Infection due to human metapneumovirus (hMPV)\par Assessment and Plan of Treatment: You were found to have human metapneumovirus on a nasal swab, which was likely a cause of the respiratory symptoms you were experiencing. You have already completed the necessary isolation period for this virus.\par \par Diagnosis: Fever\par Assessment and Plan of Treatment: \par \par Diagnosis: Bronchitis\par Assessment and Plan of Treatment: Continue prednisone 30mg x3 days (starting 6/8)\par Prednisone 20mg x3 days \par Then resume your regular dose of prednisone 10mg daily\par Goal(s)	To get better and follow your care plan as instructed.\par \par  Follow Up:\par Care Providers for Follow up (PCP/Outpatient Provider)	Justo Mayer\par CARDIOVASCULAR DISEASE\par [address]\[place] [address]\par Phone: [phone]\par Fax: [phone]\par Established Patient\par Follow Up Time: 1 week\par \par Delbert Martinez)\par Internal Medicine; Pulmonary Disease\par [address], [address]\[place] [address]\par Phone: [phone]\par Fax: [phone]\par Established Patient\par Follow Up Time: 1 week\par Discharge Diet	Regular Diet - No restrictions\par Activity	No restrictions\par \par  Quality Measures:\par Conditions at Discharge	Stable for home, with home PT\par Patient Condition	Stable\par Hospice Patient	No\par Tobacco Usage Within the Last Year	No\par Does the patient have difficulty running errands alone like visiting a doctor’s office or shopping?	No\par Does the patient have difficulty climbing stairs?	No\par Cognition: The patient has	No difficulties\par Does the patient have a principal diagnosis of ischemic stroke, hemorrhagic stroke, or TIA?	No\par Does the patient have a principal diagnosis of Acute Myocardial Infarction?	No\par Has the patient had a Percutaneous Coronary Intervention?	No\par \par  Home Health:\par Discharged with Home Health Care Services?	Yes\par Face-To-Face Contact	As certified below, I, or a nurse practitioner or physician assistant working with me, had a face-to-face encounter that meets the physician face-to-face encounter requirements.\par Need for Skilled Services	Rehabilitation services\par Based on the above findings, the following intermittent skilled services are medically necessary home health services:	Physical therapy\par Home Bound Status	Shortness of breath with minimal ambulation\par Patient Needs Assistance to Leave Residence...\par Requires assistance of another person to leave home due to:	SOB\par Attending Certification	My signature below certifies that the above stated patient is homebound and upon completion of the Face-To-Face encounter, has the need for intermittent skilled nursing, physical therapy and/or speech or occupational therapy services in their home for their current diagnosis as outlined in their initial plan of care. These services will continue to be monitored by myself or another physician.\par Encounter Date	07-Jun-2022\par \par  Document Complete:\par Care Provider Seen in Hospital	Delbert Martinez (Pulmonologist)\par Justo Mayer (Primary care physician/cardiologist)\par Dionne Elizalde (Hospitalist) \par Yony Villegas (Hospitalist PA)\par Physician Section Complete	This document is complete and the patient is ready for discharge.\par For questions about your prescriptions, please call:	[phone]\par Is this contact telephone number correct?	Yes\par \par \par \par Electronic Signatures:\par Dionne Elizalde)   (Signed 07-Jun-2022 14:52)\par 	Co-Signer: Hospital Course, Med Reconciliation, Care Plan/Procedures, Follow Up, Quality Measures, Home Health, Document Complete\par Yony Villegas (PA)   (Signed 07-Jun-2022 12:57)\par 	Authored: Hospital Course, Med Reconciliation, Care Plan/Procedures, Follow Up, Quality Measures, Home Health, Document Complete\par \par Last Updated: 07-Jun-2022 14:52 by Dionne Elizalde)\par \par \par

## 2022-06-16 NOTE — PHYSICAL EXAM
[No Acute Distress] : no acute distress [Well Nourished] : well nourished [Well Developed] : well developed [Well-Appearing] : well-appearing [Normal Sclera/Conjunctiva] : normal sclera/conjunctiva [Normal Oropharynx] : the oropharynx was normal [Supple] : supple [No Respiratory Distress] : no respiratory distress  [Clear to Auscultation] : lungs were clear to auscultation bilaterally [No Accessory Muscle Use] : no accessory muscle use [Pedal Pulses Present] : the pedal pulses are present [No Extremity Clubbing/Cyanosis] : no extremity clubbing/cyanosis [Soft] : abdomen soft [Non Tender] : non-tender [Non-distended] : non-distended [Normal Bowel Sounds] : normal bowel sounds [No Spinal Tenderness] : no spinal tenderness [Grossly Normal Strength/Tone] : grossly normal strength/tone [No Rash] : no rash [Coordination Grossly Intact] : coordination grossly intact [No Focal Deficits] : no focal deficits [Normal Affect] : the affect was normal [Alert and Oriented x3] : oriented to person, place, and time [Normal Insight/Judgement] : insight and judgment were intact [de-identified] : No thrush [de-identified] : afib, irreg [de-identified] : lymphedema BLE 1-2+ L>R

## 2022-06-16 NOTE — PLAN
[FreeTextEntry1] : A/P:\par 1. S/P hospitalization for RLL Pneumonia:\par - s/p IV cefepime, azithro.\par - completed isolation for HmPV\par - advair 250/50 BID, albuterol prn\par - Adhere to all medications including demonstrating proper use of inhalers and nebulizers.\par - Increase activity as tolerated and maintain optimal activity levels. Continue coughing and deep breathing exercises including use of Incentive Spirometry.\par - Receive routine pneumococcal and influenza vaccinations.\par - Maintain proper nutrition and adequate hydration.\par - Notify NP for worsening symptoms including fever, chills, SOB, CP, increased cough and secretions.\par \par 2. Afib:\par - rate controlled\par - con't BB, coumadin (managed by Dr. Mayer)\par \par 3. PVD with venous stasis/chronic lymphedema BLE:\par - elevate as tolerated, keep skin moisturized, monitor for breakdown\par - lasix prn\par \par 4. Hypertension:\par - mildly elevated on exam today as had not taking meds yet\par - con't BB, verapamil\par - f/u PCP as scheduled and prn\par Follow up with MD within 7 days of discharge.\par \par \par \par \par

## 2022-06-16 NOTE — ASSESSMENT
[FreeTextEntry1] : Pt is being seen after discharge home from [facility]. She was admitted on 05/26/2022 for SOB/congestion & disharged home on 06/7/2022. Discharge medications were reviewed and reconciled with the current medication list and medications in home.\par \par CC:\par Pt is seen at home s/p recent admission for PNA. Pt is temporarily unable to leave home as it requires a considerable and taxing effort\par HPI:\par Pt Is a 71 y/o female enrolled in the [facility] seen at home s/p a recent admission for PNA. Pt was contacted by TCM and med rec was done within 48 hours of DC.\par \par Hospital Course:\par PMH significant for Atrial fibrillation on Coumadin, CHF (HFpEF), DM2 with peripheral neuropathy, peripheral venous insuff, morbid obesity, HTN, HLD presented to the [facility] for further evaluation and management of c/o symptoms of sore throat, nasal congestion, cough and worsening shortness of breath over the past 4 days. Found to have acute hypoxic respiratory failure d/t hMPV with concurrent RLL PNA, bronchitis. \par Admitted with Acute hypoxic respiratory failure 2/2 hMPV, RLL PNA & Bronchitis \par - found to have hMPV on 5/26, isolation precautions \par - CT chest noting new RLL consolidation \par - Pulmonary following, appreciate recs\par - S/p Azithro x5 days\par - S/p Cefepime for 7 days total\par - C/w Prednisone 40mg daily, continue taper to patient's home dose of prednisone 10mg daily  \par - Continue albuterol/ipratropium + budesonide nebs\par - albuterol standing q4\par - mucinex BID, singular qHS\par - previously on 2L NC now weaned to RA, breathing comfortably with O2 >95%\par \par Upon examination A&O x 3 NAD, lives with  of 50 years. Saw Dr Mayer earlier this week, metoprolol increased to 150mg daily for her tachycardia and c/o some "tightness." Presently she denies CP, SOB, headache, dizziness, pain, abd discomfort or difficulty with bowel or bladder. She feels tired. [facility] services in home, Edda GILBERT. She completed prednsone taper and continues on her daily dose of 10mg. She has only used albuterol inhaler 2 or 3 times and did not feel it helped. This NP spoke with [facility], duoneb/symbicort not covered; Rx for advair sent. Nebulizer is $75 at the pharmacy level, patient will use albuterol inhaler prn in the interim. She will call her insurance re: nebulizer coverage. She will call again tomorrow to schedule appt with Dr. Gonzalez (pul) [facility] services in home. \par \par

## 2022-06-24 ENCOUNTER — APPOINTMENT (OUTPATIENT)
Dept: VASCULAR SURGERY | Facility: CLINIC | Age: 72
End: 2022-06-24
Payer: MEDICARE

## 2022-06-24 VITALS
HEART RATE: 105 BPM | SYSTOLIC BLOOD PRESSURE: 133 MMHG | HEIGHT: 70 IN | OXYGEN SATURATION: 95 % | DIASTOLIC BLOOD PRESSURE: 89 MMHG

## 2022-06-24 DIAGNOSIS — G62.9 POLYNEUROPATHY, UNSPECIFIED: ICD-10-CM

## 2022-06-24 PROCEDURE — 99213 OFFICE O/P EST LOW 20 MIN: CPT

## 2022-06-24 NOTE — HISTORY OF PRESENT ILLNESS
[FreeTextEntry1] : 8/6/21: 72 y/o female returns the office for follow-up. PMH:  DM2, HTN and A Fib. venous insufficiency, lymphedema and recurrent ulcers  She reports increased pain to her feet even with use of gabapentin 3 times a day.  In the past she states prednisone has helped but she never has had drastic improvement of symptoms.  She was advised to seek neurologic evaluation for symptoms but has not as of yet.  There continues to be improvement in edema to her legs with use of conservative treatment: Compression stockings 20 to 30 mmHg daily and lymphedema pumps.  She walks daily for exercise, elevates her legs at rest and ambulates frequently.  Pain is described as burning and tingling pain to her legs all day. She has been seeing a pain management specialist, but has not had relief of pain.  She takes ASA 81 mg, Coumadin and Pravastatin 10 mg QD. No current pain reported. No fever or chills. She is a non-smoker. \par \par 3/9/22: Pt still has leg pain since last visit. She feels the legs felt much better on prednisone. She has pain in her medial ankles bilaterally. She has been compliant with compression stockings. She does not use the pump because it feels it is too restrictive. She takes ASA 81 mg, Coumadin and Pravastatin 10 mg QD. Pt is still taking Gabapentin 800 mg. She does not take cymbalta. \par \par 6/24/22: Pt still has BLE leg pain. She feels the pain is improved since taking prednisone. She takes ASA 81 mg, Coumadin and Pravastatin 10 mg QD. Pt is still taking Gabapentin 800 mg. She does not take cymbalta.

## 2022-06-24 NOTE — PHYSICAL EXAM
[2+] : left 2+ [Ankle Swelling (On Exam)] : present [Ankle Swelling Bilaterally] : bilaterally  [] : bilaterally [Ankle Swelling On The Left] : moderate [Alert] : alert [Oriented to Person] : oriented to person [Oriented to Place] : oriented to place [Oriented to Time] : oriented to time [Calm] : calm [de-identified] : WD, WN, NAD. Awake, alert, interactive. Ambulates slowly with a cane for support. [FreeTextEntry1] : edema LLE>RLE [de-identified] : no cyanosis or deformity. full ROM, MS 5/5\par  [de-identified] : WWP. Chronic venous stasis hyperpigmentation persists distally. No wounds, ulcers or weeping. Moderate lymphedema. No s/s of infection.

## 2022-06-24 NOTE — ASSESSMENT
[FreeTextEntry1] : 73 y/o woman with lymphedema, chronic venous stasis hyperpigmentation and dermatitis and peripheral neuropathy.  We discussed she will need a neurologic evaluation to diagnose etiology of pain.\par \par Plan:\par - Continue Coumadin, ASA 81 mg and Pravastatin QD.\par - Continue Gabapentin to 800 mg TID for neuropathic pain.\par - continue Prednisone 10 mg for inflammation/pain.\par - Continue medical management of HTN.\par -  Continue conservative therapy: compression stockings 20-30 mmHg daily from awakening until bedtime, leg elevation above the level of the heart at rest and frequent ambulation.\par - Walk daily for exercise.\par - Refer to Neurology for evaluation of peripheral neuropathy.\par - RTC 3 months to monitor symptoms\par \par A total of 30 minutes was spent with patient and coordinating care\par \par

## 2022-06-28 ENCOUNTER — RX RENEWAL (OUTPATIENT)
Age: 72
End: 2022-06-28

## 2022-07-08 ENCOUNTER — APPOINTMENT (OUTPATIENT)
Dept: VASCULAR SURGERY | Facility: CLINIC | Age: 72
End: 2022-07-08

## 2022-07-08 VITALS
WEIGHT: 260 LBS | BODY MASS INDEX: 37.22 KG/M2 | HEART RATE: 82 BPM | HEIGHT: 70 IN | SYSTOLIC BLOOD PRESSURE: 133 MMHG | OXYGEN SATURATION: 96 % | DIASTOLIC BLOOD PRESSURE: 74 MMHG

## 2022-07-08 DIAGNOSIS — L03.90 CELLULITIS, UNSPECIFIED: ICD-10-CM

## 2022-07-08 PROCEDURE — 99213 OFFICE O/P EST LOW 20 MIN: CPT

## 2022-07-08 NOTE — PHYSICAL EXAM
[2+] : left 2+ [Ankle Swelling (On Exam)] : present [Ankle Swelling Bilaterally] : bilaterally  [] : bilaterally [Alert] : alert [Ankle Swelling On The Left] : moderate [Oriented to Person] : oriented to person [Oriented to Place] : oriented to place [Oriented to Time] : oriented to time [Calm] : calm [de-identified] : WD, WN, NAD. Awake, alert, interactive. Ambulates slowly with a cane for support. [FreeTextEntry1] : edema LLE>RLE\par erythema and warmth of LLE  [de-identified] : \par

## 2022-07-08 NOTE — ASSESSMENT
[FreeTextEntry1] : 73 y/o woman with LLE cellulitis. Pt has lymphedema, chronic venous stasis hyperpigmentation and dermatitis and peripheral neuropathy.  We discussed she will need a neurologic evaluation to diagnose etiology of pain.\par \par Plan:\par - Pt has cellultiis of left leg\par - Gave pt rx for cipro 500 mg \par - Continue Coumadin, ASA 81 mg and Pravastatin QD.\par - Continue Gabapentin to 800 mg TID for neuropathic pain.\par - Continue medical management of HTN.\par -  Continue conservative therapy: compression stockings 20-30 mmHg daily from awakening until bedtime, leg elevation above the level of the heart at rest and frequent ambulation.\par - Walk daily for exercise.\par - Refer to Neurology for evaluation of peripheral neuropathy.\par - RTC 2 week for evaluation of cellulitis \par \par A total of 20 minutes was spent with patient and coordinating care\par \par 
No significant past surgical history

## 2022-07-08 NOTE — HISTORY OF PRESENT ILLNESS
[FreeTextEntry1] : 8/6/21: 72 y/o female returns the office for follow-up. PMH:  DM2, HTN and A Fib. venous insufficiency, lymphedema and recurrent ulcers  She reports increased pain to her feet even with use of gabapentin 3 times a day.  In the past she states prednisone has helped but she never has had drastic improvement of symptoms.  She was advised to seek neurologic evaluation for symptoms but has not as of yet.  There continues to be improvement in edema to her legs with use of conservative treatment: Compression stockings 20 to 30 mmHg daily and lymphedema pumps.  She walks daily for exercise, elevates her legs at rest and ambulates frequently.  Pain is described as burning and tingling pain to her legs all day. She has been seeing a pain management specialist, but has not had relief of pain.  She takes ASA 81 mg, Coumadin and Pravastatin 10 mg QD. No current pain reported. No fever or chills. She is a non-smoker. \par \par 3/9/22: Pt still has leg pain since last visit. She feels the legs felt much better on prednisone. She has pain in her medial ankles bilaterally. She has been compliant with compression stockings. She does not use the pump because it feels it is too restrictive. She takes ASA 81 mg, Coumadin and Pravastatin 10 mg QD. Pt is still taking Gabapentin 800 mg. She does not take cymbalta. \par \par 6/24/22: Pt still has BLE leg pain. She feels the pain is improved since taking prednisone. She takes ASA 81 mg, Coumadin and Pravastatin 10 mg QD. Pt is still taking Gabapentin 800 mg. She does not take cymbalta. \par \par 7/8/22: Pt has increased LLE edema, erythema and pain.  She denies any recent fevers. She has been compliant with compression stockings. She takes ASA 81 mg, Coumadin and Pravastatin 10 mg QD. Pt is still taking Gabapentin 800 mg. She does not take cymbalta.

## 2022-07-19 NOTE — ED ADULT NURSE NOTE - NS PRO PASSIVE SMOKE EXP
"[facility]  Gynecology  Post-operative Visit    DOS: 21  Pre-op diagnosis: persistent postmenopausal bleeding with failed medical management  Procedure: Total laparoscopic hysterectomy, bilateral salpingo-oophorectomy, cystoscopy, lysis of adhesions (> 30 minutes)  Pathology:   UTERUS, FALLOPIAN TUBES AND OVARIES:   CERVIX:   -Squamous metaplasia with accompanying chronic cervicitis   -Negative for intraepithelial neoplasia/malignancy   ENDOMETRIUM:   -Benign endometrial polyp   -Negative for atypical hyperplasia/malignancy   MYOMETRIUM:   -Superficial adenomyosis   -Intramural leiomyoma   ADNEXA:   -Bilateral benign ovaries and fallopian tubes     Patient presents for post-op visit.  She states that she is overall doing very well.  She denies any significant pain.  She is not having any vaginal bleeding.  She is ambulating without assistance.  She is voiding regularly and having bowel movements.  She did initially have some constipation and hemorrhoids, but reports that it is mostly resolved.    /60 (BP Location: Left arm, Patient Position: Sitting, Cuff Size: Adult)   Ht 170.2 cm (67\")   Wt 136 kg (299 lb 3.2 oz)   BMI 46.86 kg/m²   Gen: NAD, AAO x3  Chest: No increased work of breathing  Abdomen: Incisions clean/dry/intact, abdomen soft, appropriately tender around incisions, no rebound or guarding, nondistended  Rectal: No apparent hemorrhoids noted    A/P: Talya Schumacher is a 58 y.o.  s/p Total laparoscopic hysterectomy, bilateral salpingo-oophorectomy, cystoscopy, lysis of adhesions (> 30 minutes) on 2022.  Doing well.  - Reviewed pathology  -Patient overall is doing well, discussed continued pelvic rest and avoiding lifting more than 10 to 15 pounds  -No immediate postoperative concerns, will follow-up in 4 weeks for 6 weeks postoperative visit to clear her for normal activities        This document has been electronically signed by Lucia Golden DO on 2022 " 15:19 CDT         No

## 2022-07-22 ENCOUNTER — APPOINTMENT (OUTPATIENT)
Dept: VASCULAR SURGERY | Facility: CLINIC | Age: 72
End: 2022-07-22

## 2022-08-01 NOTE — DISCHARGE NOTE ADULT - NS AS DC FU INST LIST INST
If your nasal symptoms don't improve after 1 month of every day usage of fluticasone nasal spray, then please call the office for \"CT sinus surgical preplanning no contrast\" with return visit afterwards to discuss test results.  
yes

## 2022-08-05 ENCOUNTER — APPOINTMENT (OUTPATIENT)
Dept: VASCULAR SURGERY | Facility: CLINIC | Age: 72
End: 2022-08-05

## 2022-08-05 VITALS
HEIGHT: 70 IN | BODY MASS INDEX: 37.22 KG/M2 | DIASTOLIC BLOOD PRESSURE: 71 MMHG | SYSTOLIC BLOOD PRESSURE: 124 MMHG | OXYGEN SATURATION: 95 % | WEIGHT: 260 LBS | HEART RATE: 86 BPM

## 2022-08-05 DIAGNOSIS — Q82.0 HEREDITARY LYMPHEDEMA: ICD-10-CM

## 2022-08-05 PROCEDURE — 99213 OFFICE O/P EST LOW 20 MIN: CPT

## 2022-09-16 ENCOUNTER — APPOINTMENT (OUTPATIENT)
Dept: VASCULAR SURGERY | Facility: CLINIC | Age: 72
End: 2022-09-16
Payer: MEDICARE

## 2022-09-16 VITALS
OXYGEN SATURATION: 94 % | HEART RATE: 64 BPM | DIASTOLIC BLOOD PRESSURE: 76 MMHG | HEIGHT: 70 IN | BODY MASS INDEX: 37.22 KG/M2 | WEIGHT: 260 LBS | SYSTOLIC BLOOD PRESSURE: 116 MMHG

## 2022-09-16 PROCEDURE — 99213 OFFICE O/P EST LOW 20 MIN: CPT

## 2022-09-23 ENCOUNTER — APPOINTMENT (OUTPATIENT)
Dept: VASCULAR SURGERY | Facility: CLINIC | Age: 72
End: 2022-09-23

## 2022-09-23 VITALS
WEIGHT: 260 LBS | SYSTOLIC BLOOD PRESSURE: 136 MMHG | DIASTOLIC BLOOD PRESSURE: 88 MMHG | HEIGHT: 70 IN | BODY MASS INDEX: 37.22 KG/M2 | HEART RATE: 92 BPM | OXYGEN SATURATION: 94 %

## 2022-09-23 PROCEDURE — 99213 OFFICE O/P EST LOW 20 MIN: CPT

## 2022-09-23 NOTE — ASSESSMENT
[FreeTextEntry1] : 71 y/o woman with left leg swelling and pain . Pt has lymphedema, chronic venous stasis hyperpigmentation and dermatitis and peripheral neuropathy.  We discussed she will need a neurologic evaluation to diagnose etiology of pain.\par \par Plan:\par - wrapped left leg with kerlex and ACE wrap \par - Continue Coumadin, ASA 81 mg and Pravastatin QD.\par - Continue Gabapentin to 800 mg TID for neuropathic pain.\par - Continue medical management of HTN.\par -  Continue conservative therapy: compression stockings 20-30 mmHg daily from awakening until bedtime, leg elevation above the level of the heart at rest and frequent ambulation.\par - Walk daily for exercise.\par - Refer to Neurology for evaluation of peripheral neuropathy.\par - RTC 1 week to monitor left leg swelling \par \par A total of 20 minutes was spent with patient and coordinating care\par \par

## 2022-09-23 NOTE — PHYSICAL EXAM
[2+] : left 2+ [Ankle Swelling (On Exam)] : present [Ankle Swelling Bilaterally] : bilaterally  [] : bilaterally [Ankle Swelling On The Left] : moderate [Alert] : alert [Oriented to Person] : oriented to person [Oriented to Place] : oriented to place [Oriented to Time] : oriented to time [Calm] : calm [de-identified] : WD, WN, NAD. Awake, alert, interactive. Ambulates slowly with a cane for support. [FreeTextEntry1] : edema LLE>RLE\par erythema and warmth of LLE  [de-identified] : \par

## 2022-09-23 NOTE — HISTORY OF PRESENT ILLNESS
[FreeTextEntry1] : 8/6/21: 70 y/o female returns the office for follow-up. PMH:  DM2, HTN and A Fib. venous insufficiency, lymphedema and recurrent ulcers  She reports increased pain to her feet even with use of gabapentin 3 times a day.  In the past she states prednisone has helped but she never has had drastic improvement of symptoms.  She was advised to seek neurologic evaluation for symptoms but has not as of yet.  There continues to be improvement in edema to her legs with use of conservative treatment: Compression stockings 20 to 30 mmHg daily and lymphedema pumps.  She walks daily for exercise, elevates her legs at rest and ambulates frequently.  Pain is described as burning and tingling pain to her legs all day. She has been seeing a pain management specialist, but has not had relief of pain.  She takes ASA 81 mg, Coumadin and Pravastatin 10 mg QD. No current pain reported. No fever or chills. She is a non-smoker. \par \par 3/9/22: Pt still has leg pain since last visit. She feels the legs felt much better on prednisone. She has pain in her medial ankles bilaterally. She has been compliant with compression stockings. She does not use the pump because it feels it is too restrictive. She takes ASA 81 mg, Coumadin and Pravastatin 10 mg QD. Pt is still taking Gabapentin 800 mg. She does not take cymbalta. \par \par 6/24/22: Pt still has BLE leg pain. She feels the pain is improved since taking prednisone. She takes ASA 81 mg, Coumadin and Pravastatin 10 mg QD. Pt is still taking Gabapentin 800 mg. She does not take cymbalta. \par \par 7/8/22: Pt has increased LLE edema, erythema and pain.  She denies any recent fevers. She has been compliant with compression stockings. She takes ASA 81 mg, Coumadin and Pravastatin 10 mg QD. Pt is still taking Gabapentin 800 mg. She does not take cymbalta. \par \par 9/23/22: Pt has increased LLE edema, erythema and pain.  She denies any recent fevers. She has been compliant with compression stockings. She takes ASA 81 mg, Coumadin and Pravastatin 10 mg QD. Pt is still taking Gabapentin 800 mg. She does not take cymbalta.

## 2022-10-03 RX ORDER — AZTREONAM 2 G
1 VIAL (EA) INJECTION
Qty: 0 | Refills: 0 | DISCHARGE
Start: 2022-10-03 | End: 2022-10-09

## 2022-10-03 NOTE — H&P ADULT - PMH
Atrial fibrillation    Congestive heart failure    Diabetes mellitus type II, controlled    Dyspnea    HTN (hypertension)    Morbid obesity with BMI of 40.0-44.9, adult    Neuropathy    Peripheral venous insufficiency    Thyroid nodule
Nasal Suction/Tracheal suctioning

## 2022-10-03 NOTE — DISCHARGE NOTE ADULT - CARE PLAN
Add Additional Warning Signs...
Principal Discharge DX:	Cellulitis of right lower extremity  Goal:	prevent further admission  Assessment and plan of treatment:	Wound care:  wound care with collagenase, xeroform, 4x4 kerlix and ace bandage for compression.  outpt follow up Dr. Yoo.

## 2022-10-07 ENCOUNTER — APPOINTMENT (OUTPATIENT)
Dept: VASCULAR SURGERY | Facility: CLINIC | Age: 72
End: 2022-10-07

## 2022-10-07 VITALS
HEART RATE: 60 BPM | HEIGHT: 70 IN | BODY MASS INDEX: 37.94 KG/M2 | OXYGEN SATURATION: 95 % | WEIGHT: 265 LBS | DIASTOLIC BLOOD PRESSURE: 70 MMHG | SYSTOLIC BLOOD PRESSURE: 93 MMHG

## 2022-10-07 DIAGNOSIS — M79.89 PAIN IN RIGHT LOWER LEG: ICD-10-CM

## 2022-10-07 DIAGNOSIS — M79.661 PAIN IN RIGHT LOWER LEG: ICD-10-CM

## 2022-10-07 PROCEDURE — 99213 OFFICE O/P EST LOW 20 MIN: CPT

## 2022-10-07 NOTE — PHYSICAL EXAM
[2+] : left 2+ [Ankle Swelling (On Exam)] : present [Ankle Swelling Bilaterally] : bilaterally  [] : bilaterally [Ankle Swelling On The Left] : moderate [Alert] : alert [Oriented to Person] : oriented to person [Oriented to Place] : oriented to place [Oriented to Time] : oriented to time [Calm] : calm [de-identified] : WD, WN, NAD. Awake, alert, interactive. Ambulates slowly with a cane for support. [FreeTextEntry1] : edema LLE>RLE\par erythema and warmth of LLE  [de-identified] : \par

## 2022-10-07 NOTE — ASSESSMENT
[FreeTextEntry1] : 71 y/o woman with right leg ulcer and left leg swelling and pain . Pt has lymphedema, chronic venous stasis hyperpigmentation and dermatitis and peripheral neuropathy.  We discussed she will need a neurologic evaluation to diagnose etiology of pain.\par \par Plan:\par - Pt given rx for levo for right calf ulcer \par - wrapped left leg with kerlex and ACE wrap \par - Continue Coumadin, ASA 81 mg and Pravastatin QD.\par - Continue Gabapentin to 800 mg TID for neuropathic pain.\par - Continue medical management of HTN.\par -  Continue conservative therapy: compression stockings 20-30 mmHg daily from awakening until bedtime, leg elevation above the level of the heart at rest and frequent ambulation.\par - Walk daily for exercise.\par - Refer to Neurology for evaluation of peripheral neuropathy.\par - RTC 1 week to monitor left leg swelling \par \par A total of 20 minutes was spent with patient and coordinating care\par \par

## 2022-10-07 NOTE — HISTORY OF PRESENT ILLNESS
[FreeTextEntry1] : 8/6/21: 72 y/o female returns the office for follow-up. PMH:  DM2, HTN and A Fib. venous insufficiency, lymphedema and recurrent ulcers  She reports increased pain to her feet even with use of gabapentin 3 times a day.  In the past she states prednisone has helped but she never has had drastic improvement of symptoms.  She was advised to seek neurologic evaluation for symptoms but has not as of yet.  There continues to be improvement in edema to her legs with use of conservative treatment: Compression stockings 20 to 30 mmHg daily and lymphedema pumps.  She walks daily for exercise, elevates her legs at rest and ambulates frequently.  Pain is described as burning and tingling pain to her legs all day. She has been seeing a pain management specialist, but has not had relief of pain.  She takes ASA 81 mg, Coumadin and Pravastatin 10 mg QD. No current pain reported. No fever or chills. She is a non-smoker. \par \par 3/9/22: Pt still has leg pain since last visit. She feels the legs felt much better on prednisone. She has pain in her medial ankles bilaterally. She has been compliant with compression stockings. She does not use the pump because it feels it is too restrictive. She takes ASA 81 mg, Coumadin and Pravastatin 10 mg QD. Pt is still taking Gabapentin 800 mg. She does not take cymbalta. \par \par 6/24/22: Pt still has BLE leg pain. She feels the pain is improved since taking prednisone. She takes ASA 81 mg, Coumadin and Pravastatin 10 mg QD. Pt is still taking Gabapentin 800 mg. She does not take cymbalta. \par \par 7/8/22: Pt has increased LLE edema, erythema and pain.  She denies any recent fevers. She has been compliant with compression stockings. She takes ASA 81 mg, Coumadin and Pravastatin 10 mg QD. Pt is still taking Gabapentin 800 mg. She does not take cymbalta. \par \par 9/23/22: Pt has increased LLE edema, erythema and pain.  She denies any recent fevers. She has been compliant with compression stockings. She takes ASA 81 mg, Coumadin and Pravastatin 10 mg QD. Pt is still taking Gabapentin 800 mg. She does not take cymbalta. \par \par 10/7/22: Pt has new right ankle wound. She also has increased LLE edema, erythema and pain.  She denies any recent fevers. She has been compliant with compression stockings. She takes ASA 81 mg, Coumadin and Pravastatin 10 mg QD. Pt is still taking Gabapentin 800 mg. She does not take cymbalta.

## 2022-10-12 ENCOUNTER — INPATIENT (INPATIENT)
Facility: HOSPITAL | Age: 72
LOS: 14 days | Discharge: HOME HEALTH | DRG: 871 | End: 2022-10-27
Attending: FAMILY MEDICINE | Admitting: INTERNAL MEDICINE
Payer: MEDICARE

## 2022-10-12 VITALS — WEIGHT: 265 LBS | HEIGHT: 70 IN

## 2022-10-12 DIAGNOSIS — K95.09 OTHER COMPLICATIONS OF GASTRIC BAND PROCEDURE: Chronic | ICD-10-CM

## 2022-10-12 DIAGNOSIS — Z98.890 OTHER SPECIFIED POSTPROCEDURAL STATES: Chronic | ICD-10-CM

## 2022-10-12 DIAGNOSIS — L03.119 CELLULITIS OF UNSPECIFIED PART OF LIMB: ICD-10-CM

## 2022-10-12 DIAGNOSIS — R09.89 OTHER SPECIFIED SYMPTOMS AND SIGNS INVOLVING THE CIRCULATORY AND RESPIRATORY SYSTEMS: ICD-10-CM

## 2022-10-12 LAB
ALBUMIN SERPL ELPH-MCNC: 2.9 G/DL — LOW (ref 3.3–5)
ALP SERPL-CCNC: 45 U/L — SIGNIFICANT CHANGE UP (ref 40–120)
ALT FLD-CCNC: 26 U/L — SIGNIFICANT CHANGE UP (ref 12–78)
ANION GAP SERPL CALC-SCNC: 7 MMOL/L — SIGNIFICANT CHANGE UP (ref 5–17)
AST SERPL-CCNC: 12 U/L — LOW (ref 15–37)
BASOPHILS # BLD AUTO: 0.04 K/UL — SIGNIFICANT CHANGE UP (ref 0–0.2)
BASOPHILS NFR BLD AUTO: 0.2 % — SIGNIFICANT CHANGE UP (ref 0–2)
BILIRUB SERPL-MCNC: 0.4 MG/DL — SIGNIFICANT CHANGE UP (ref 0.2–1.2)
BUN SERPL-MCNC: 24 MG/DL — HIGH (ref 7–23)
CALCIUM SERPL-MCNC: 8.6 MG/DL — SIGNIFICANT CHANGE UP (ref 8.5–10.1)
CHLORIDE SERPL-SCNC: 107 MMOL/L — SIGNIFICANT CHANGE UP (ref 96–108)
CO2 SERPL-SCNC: 24 MMOL/L — SIGNIFICANT CHANGE UP (ref 22–31)
CREAT SERPL-MCNC: 1.14 MG/DL — SIGNIFICANT CHANGE UP (ref 0.5–1.3)
EGFR: 51 ML/MIN/1.73M2 — LOW
EOSINOPHIL # BLD AUTO: 0.02 K/UL — SIGNIFICANT CHANGE UP (ref 0–0.5)
EOSINOPHIL NFR BLD AUTO: 0.1 % — SIGNIFICANT CHANGE UP (ref 0–6)
GLUCOSE SERPL-MCNC: 139 MG/DL — HIGH (ref 70–99)
HCT VFR BLD CALC: 43.3 % — SIGNIFICANT CHANGE UP (ref 34.5–45)
HGB BLD-MCNC: 14.1 G/DL — SIGNIFICANT CHANGE UP (ref 11.5–15.5)
IMM GRANULOCYTES NFR BLD AUTO: 0.6 % — SIGNIFICANT CHANGE UP (ref 0–0.9)
LACTATE SERPL-SCNC: 3.2 MMOL/L — HIGH (ref 0.7–2)
LIDOCAIN IGE QN: 138 U/L — SIGNIFICANT CHANGE UP (ref 73–393)
LYMPHOCYTES # BLD AUTO: 0.98 K/UL — LOW (ref 1–3.3)
LYMPHOCYTES # BLD AUTO: 5.5 % — LOW (ref 13–44)
MAGNESIUM SERPL-MCNC: 1.4 MG/DL — LOW (ref 1.6–2.6)
MCHC RBC-ENTMCNC: 32 PG — SIGNIFICANT CHANGE UP (ref 27–34)
MCHC RBC-ENTMCNC: 32.6 GM/DL — SIGNIFICANT CHANGE UP (ref 32–36)
MCV RBC AUTO: 98.2 FL — SIGNIFICANT CHANGE UP (ref 80–100)
MONOCYTES # BLD AUTO: 0.56 K/UL — SIGNIFICANT CHANGE UP (ref 0–0.9)
MONOCYTES NFR BLD AUTO: 3.1 % — SIGNIFICANT CHANGE UP (ref 2–14)
NEUTROPHILS # BLD AUTO: 16.07 K/UL — HIGH (ref 1.8–7.4)
NEUTROPHILS NFR BLD AUTO: 90.5 % — HIGH (ref 43–77)
PLATELET # BLD AUTO: 238 K/UL — SIGNIFICANT CHANGE UP (ref 150–400)
POTASSIUM SERPL-MCNC: 4.4 MMOL/L — SIGNIFICANT CHANGE UP (ref 3.5–5.3)
POTASSIUM SERPL-SCNC: 4.4 MMOL/L — SIGNIFICANT CHANGE UP (ref 3.5–5.3)
PROT SERPL-MCNC: 6.5 GM/DL — SIGNIFICANT CHANGE UP (ref 6–8.3)
RAPID RVP RESULT: SIGNIFICANT CHANGE UP
RBC # BLD: 4.41 M/UL — SIGNIFICANT CHANGE UP (ref 3.8–5.2)
RBC # FLD: 13.5 % — SIGNIFICANT CHANGE UP (ref 10.3–14.5)
SARS-COV-2 RNA SPEC QL NAA+PROBE: SIGNIFICANT CHANGE UP
SODIUM SERPL-SCNC: 138 MMOL/L — SIGNIFICANT CHANGE UP (ref 135–145)
TROPONIN I, HIGH SENSITIVITY RESULT: 9 NG/L — SIGNIFICANT CHANGE UP
WBC # BLD: 17.78 K/UL — HIGH (ref 3.8–10.5)
WBC # FLD AUTO: 17.78 K/UL — HIGH (ref 3.8–10.5)

## 2022-10-12 PROCEDURE — 83735 ASSAY OF MAGNESIUM: CPT

## 2022-10-12 PROCEDURE — 71045 X-RAY EXAM CHEST 1 VIEW: CPT | Mod: 26

## 2022-10-12 PROCEDURE — 85025 COMPLETE CBC W/AUTO DIFF WBC: CPT

## 2022-10-12 PROCEDURE — 81001 URINALYSIS AUTO W/SCOPE: CPT

## 2022-10-12 PROCEDURE — 85730 THROMBOPLASTIN TIME PARTIAL: CPT

## 2022-10-12 PROCEDURE — 84100 ASSAY OF PHOSPHORUS: CPT

## 2022-10-12 PROCEDURE — 87086 URINE CULTURE/COLONY COUNT: CPT

## 2022-10-12 PROCEDURE — 97530 THERAPEUTIC ACTIVITIES: CPT | Mod: GP

## 2022-10-12 PROCEDURE — 73701 CT LOWER EXTREMITY W/DYE: CPT | Mod: 26,RT,MF

## 2022-10-12 PROCEDURE — U0003: CPT

## 2022-10-12 PROCEDURE — 87040 BLOOD CULTURE FOR BACTERIA: CPT

## 2022-10-12 PROCEDURE — 83036 HEMOGLOBIN GLYCOSYLATED A1C: CPT

## 2022-10-12 PROCEDURE — 80202 ASSAY OF VANCOMYCIN: CPT

## 2022-10-12 PROCEDURE — 94640 AIRWAY INHALATION TREATMENT: CPT

## 2022-10-12 PROCEDURE — 93971 EXTREMITY STUDY: CPT | Mod: 26,RT

## 2022-10-12 PROCEDURE — 99291 CRITICAL CARE FIRST HOUR: CPT

## 2022-10-12 PROCEDURE — U0005: CPT

## 2022-10-12 PROCEDURE — G1004: CPT

## 2022-10-12 PROCEDURE — 97116 GAIT TRAINING THERAPY: CPT | Mod: GP

## 2022-10-12 PROCEDURE — 86140 C-REACTIVE PROTEIN: CPT

## 2022-10-12 PROCEDURE — 85027 COMPLETE CBC AUTOMATED: CPT

## 2022-10-12 PROCEDURE — 93005 ELECTROCARDIOGRAM TRACING: CPT

## 2022-10-12 PROCEDURE — 83605 ASSAY OF LACTIC ACID: CPT

## 2022-10-12 PROCEDURE — 36415 COLL VENOUS BLD VENIPUNCTURE: CPT

## 2022-10-12 PROCEDURE — 80053 COMPREHEN METABOLIC PANEL: CPT

## 2022-10-12 PROCEDURE — 93010 ELECTROCARDIOGRAM REPORT: CPT

## 2022-10-12 PROCEDURE — 97161 PT EVAL LOW COMPLEX 20 MIN: CPT | Mod: GP

## 2022-10-12 PROCEDURE — 85610 PROTHROMBIN TIME: CPT

## 2022-10-12 PROCEDURE — 85652 RBC SED RATE AUTOMATED: CPT

## 2022-10-12 PROCEDURE — 87635 SARS-COV-2 COVID-19 AMP PRB: CPT

## 2022-10-12 PROCEDURE — 82962 GLUCOSE BLOOD TEST: CPT

## 2022-10-12 PROCEDURE — 80048 BASIC METABOLIC PNL TOTAL CA: CPT

## 2022-10-12 RX ORDER — SODIUM CHLORIDE 9 MG/ML
2100 INJECTION INTRAMUSCULAR; INTRAVENOUS; SUBCUTANEOUS ONCE
Refills: 0 | Status: COMPLETED | OUTPATIENT
Start: 2022-10-12 | End: 2022-10-12

## 2022-10-12 RX ORDER — PHENYLEPHRINE HYDROCHLORIDE 10 MG/ML
0.5 INJECTION INTRAVENOUS
Qty: 40 | Refills: 0 | Status: DISCONTINUED | OUTPATIENT
Start: 2022-10-12 | End: 2022-10-13

## 2022-10-12 RX ORDER — ACETAMINOPHEN 500 MG
650 TABLET ORAL EVERY 6 HOURS
Refills: 0 | Status: DISCONTINUED | OUTPATIENT
Start: 2022-10-12 | End: 2022-10-27

## 2022-10-12 RX ORDER — BUDESONIDE AND FORMOTEROL FUMARATE DIHYDRATE 160; 4.5 UG/1; UG/1
2 AEROSOL RESPIRATORY (INHALATION)
Refills: 0 | Status: DISCONTINUED | OUTPATIENT
Start: 2022-10-12 | End: 2022-10-27

## 2022-10-12 RX ORDER — VANCOMYCIN HCL 1 G
1750 VIAL (EA) INTRAVENOUS ONCE
Refills: 0 | Status: COMPLETED | OUTPATIENT
Start: 2022-10-12 | End: 2022-10-12

## 2022-10-12 RX ORDER — SODIUM CHLORIDE 9 MG/ML
1000 INJECTION, SOLUTION INTRAVENOUS
Refills: 0 | Status: DISCONTINUED | OUTPATIENT
Start: 2022-10-12 | End: 2022-10-14

## 2022-10-12 RX ORDER — CEFTRIAXONE 500 MG/1
1000 INJECTION, POWDER, FOR SOLUTION INTRAMUSCULAR; INTRAVENOUS ONCE
Refills: 0 | Status: COMPLETED | OUTPATIENT
Start: 2022-10-12 | End: 2022-10-12

## 2022-10-12 RX ORDER — CEFEPIME 1 G/1
INJECTION, POWDER, FOR SOLUTION INTRAMUSCULAR; INTRAVENOUS
Refills: 0 | Status: DISCONTINUED | OUTPATIENT
Start: 2022-10-13 | End: 2022-10-13

## 2022-10-12 RX ORDER — MAGNESIUM SULFATE 500 MG/ML
2 VIAL (ML) INJECTION ONCE
Refills: 0 | Status: COMPLETED | OUTPATIENT
Start: 2022-10-12 | End: 2022-10-13

## 2022-10-12 RX ORDER — DEXTROSE 50 % IN WATER 50 %
25 SYRINGE (ML) INTRAVENOUS ONCE
Refills: 0 | Status: DISCONTINUED | OUTPATIENT
Start: 2022-10-12 | End: 2022-10-27

## 2022-10-12 RX ORDER — CEFEPIME 1 G/1
2000 INJECTION, POWDER, FOR SOLUTION INTRAMUSCULAR; INTRAVENOUS EVERY 8 HOURS
Refills: 0 | Status: DISCONTINUED | OUTPATIENT
Start: 2022-10-13 | End: 2022-10-13

## 2022-10-12 RX ORDER — INSULIN LISPRO 100/ML
VIAL (ML) SUBCUTANEOUS
Refills: 0 | Status: DISCONTINUED | OUTPATIENT
Start: 2022-10-12 | End: 2022-10-27

## 2022-10-12 RX ORDER — SODIUM CHLORIDE 9 MG/ML
1000 INJECTION, SOLUTION INTRAVENOUS
Refills: 0 | Status: DISCONTINUED | OUTPATIENT
Start: 2022-10-12 | End: 2022-10-27

## 2022-10-12 RX ORDER — DEXTROSE 50 % IN WATER 50 %
12.5 SYRINGE (ML) INTRAVENOUS ONCE
Refills: 0 | Status: DISCONTINUED | OUTPATIENT
Start: 2022-10-12 | End: 2022-10-27

## 2022-10-12 RX ORDER — MAGNESIUM SULFATE 500 MG/ML
2 VIAL (ML) INJECTION ONCE
Refills: 0 | Status: COMPLETED | OUTPATIENT
Start: 2022-10-12 | End: 2022-10-12

## 2022-10-12 RX ORDER — ALBUTEROL 90 UG/1
2 AEROSOL, METERED ORAL EVERY 6 HOURS
Refills: 0 | Status: DISCONTINUED | OUTPATIENT
Start: 2022-10-12 | End: 2022-10-27

## 2022-10-12 RX ORDER — ACETAMINOPHEN 500 MG
1000 TABLET ORAL ONCE
Refills: 0 | Status: COMPLETED | OUTPATIENT
Start: 2022-10-12 | End: 2022-10-12

## 2022-10-12 RX ORDER — VANCOMYCIN HCL 1 G
1750 VIAL (EA) INTRAVENOUS EVERY 12 HOURS
Refills: 0 | Status: DISCONTINUED | OUTPATIENT
Start: 2022-10-12 | End: 2022-10-13

## 2022-10-12 RX ORDER — DEXTROSE 50 % IN WATER 50 %
15 SYRINGE (ML) INTRAVENOUS ONCE
Refills: 0 | Status: DISCONTINUED | OUTPATIENT
Start: 2022-10-12 | End: 2022-10-27

## 2022-10-12 RX ORDER — CEFEPIME 1 G/1
2000 INJECTION, POWDER, FOR SOLUTION INTRAMUSCULAR; INTRAVENOUS ONCE
Refills: 0 | Status: COMPLETED | OUTPATIENT
Start: 2022-10-12 | End: 2022-10-13

## 2022-10-12 RX ORDER — GLUCAGON INJECTION, SOLUTION 0.5 MG/.1ML
1 INJECTION, SOLUTION SUBCUTANEOUS ONCE
Refills: 0 | Status: DISCONTINUED | OUTPATIENT
Start: 2022-10-12 | End: 2022-10-27

## 2022-10-12 RX ORDER — VANCOMYCIN HCL 1 G
1000 VIAL (EA) INTRAVENOUS ONCE
Refills: 0 | Status: DISCONTINUED | OUTPATIENT
Start: 2022-10-12 | End: 2022-10-12

## 2022-10-12 RX ORDER — SIMVASTATIN 20 MG/1
10 TABLET, FILM COATED ORAL AT BEDTIME
Refills: 0 | Status: DISCONTINUED | OUTPATIENT
Start: 2022-10-12 | End: 2022-10-27

## 2022-10-12 RX ADMIN — CEFTRIAXONE 100 MILLIGRAM(S): 500 INJECTION, POWDER, FOR SOLUTION INTRAMUSCULAR; INTRAVENOUS at 21:06

## 2022-10-12 RX ADMIN — Medication 250 MILLIGRAM(S): at 22:39

## 2022-10-12 RX ADMIN — PHENYLEPHRINE HYDROCHLORIDE 22.5 MICROGRAM(S)/KG/MIN: 10 INJECTION INTRAVENOUS at 23:17

## 2022-10-12 RX ADMIN — SODIUM CHLORIDE 2100 MILLILITER(S): 9 INJECTION INTRAMUSCULAR; INTRAVENOUS; SUBCUTANEOUS at 20:29

## 2022-10-12 RX ADMIN — Medication 1000 MILLIGRAM(S): at 21:06

## 2022-10-12 NOTE — H&P ADULT - NSHPLABSRESULTS_GEN_ALL_CORE
< from: US Duplex Venous Lower Ext Ltd, Right (10.12.22 @ 22:06) >      ACC: [id number] EXAM:  US DPLX LWR EXT VEINS LTD RT                          PROCEDURE DATE:  10/12/2022          INTERPRETATION:  CLINICAL INDICATION: discoloration of right lower   extremity with pain. Assess DVT.    TECHNIQUE: Grayscale, color Doppler and spectral Doppler ultrasound was   utilized to evaluate the right lower extremity deep venous system.    COMPARISON: 5/12/2019.    FINDINGS: There is no obvious thrombus in the right common femoral vein,   femoral vein or popliteal vein. Visualized gastrocnemius vein, posterior   tibial vein and peroneal vein are patent. The soleal vein was not   visualized.    IMPRESSION:    The right soleal vein was not visualized. No obvious thrombus in the   visualized right lower extremity deep veins.    --- End of Report ---    < end of copied text >

## 2022-10-12 NOTE — H&P ADULT - NEUROLOGICAL
negative normal/cranial nerves II-XII intact/sensation intact/responds to pain/responds to verbal commands/no spontaneous movement

## 2022-10-12 NOTE — ED PROVIDER NOTE - SKIN, MLM
RLE uniformly erythematous and swollen from foot to proximal lower leg, with 2cm small ulceration medially near the ankle and other open spots of weeping. No palpable dp pulse, but normal cap refill to toes. LLE with ulceration to the dorsum of left foot, without signs of secondary infection. RLE uniformly erythematous and swollen from foot to proximal lower leg, with 2cm small ulceration medially near the ankle and other open spots of weeping. No palpable dp pulse, but normal cap refill to toes and + dopplerable pulse (also dopplerable PT pulse). LLE with ulceration to the dorsum of left foot, without signs of secondary infection.

## 2022-10-12 NOTE — H&P ADULT - HISTORY OF PRESENT ILLNESS
71 y/o female pmhx Afib on coumadin, HFpEF, DM2, peripheral neuropathy, PVD, HTN, HLD, obesity presented to ER tonight w/ fevers and right lower extremity pain. Patient called her vascular surgeons ( Dr. Yoo)  office today, who instructed her to come to the ER. Patient states the pain in her RLE worsened over the past 2 days. Today she admits to increased fatigue and fevers. She denies any SOB, CP, abdominal pain, N/V. In ER meet sepsis criteria, given 30cc/kg IVF, broad spectrum abx and pan cultured. Seen by vascular surgery in the ER, ordered imaging of RLE. Post IVF patient remained hypotensive, started on phenylephrine. Admitted to ICU.

## 2022-10-12 NOTE — ED PROVIDER NOTE - OBJECTIVE STATEMENT
73 y/o female with PMHx of A-Fib on Coumadin, CHF (HFpEF), DM2 with peripheral neuropathy, peripheral venous insufficieny, morbid obesity, HTN, HLD presents to the ED c/o LE pain and redness. Pt states she has blisters on her LEs and is having burning pain, worse to her RLE with redness. Pt states she also developed a fever today, so she called her vascular doctor over the phone and he advised she come to the ED for evaluation. Pt also notes feeling slightly SOB. Denies chest pain, abdominal pain. PCP/Cardiologist: Dr. Justo Mayer. Vascular doctor: Dr. Yoo.

## 2022-10-12 NOTE — ED PROVIDER NOTE - NS ED MD DISPO SPECIAL CONSIDERATION1
----- Message from Deejay Aguilar MD sent at 12/13/2019  4:41 PM CST -----  Please inform patient of the following results  Pap smear does not show any evidence of cancer because show atypical cells however she is HPV negative.   I recommend that she have repeats Pap smear in 1 year None

## 2022-10-12 NOTE — ED PROVIDER NOTE - PROGRESS NOTE DETAILS
ICU consult.  pressures remain soft after fluid resuscitation. seen at bedside by surgery resident sent by vascular attending Naila.  they are recommending further imaging, ordered by surgery. accepted to ICU

## 2022-10-12 NOTE — ED ADULT NURSE NOTE - OBJECTIVE STATEMENT
Patient presented to the ED c/o fever, cellulitis and shortness of breath. Patient reports pain in her lower extremities. Patient reports hx of venous insufficiency. Patients right lower extremity noted to be discolored (red/purple), swollen and warm to the touch. Patients left lower extremity noted to be red/pink in color, swollen and warm to the touch. Patient reports fever began today so she was advised to come to the ED for eval. Patient reports shortness of breath but denies chest pain. Patient reports pmhx of afib and DM. Positive ble pulses via doppler. Patient resting comfortably in bed at this time.

## 2022-10-12 NOTE — PROVIDER CONTACT NOTE (EICU) - RECOMMENDATIONS
continue broad spectrum empiric abx pending cultures  cefepime and vanco dose (15-20 ml/kg)  ID consult pending  titrate neosynephrine to maintain MAP>65  CTA of LE pending, in addition continue IVF LR at 75 ml/hr to prevent post contrast nephropathy  Discussion with Vascular Surgery and ICU PA  Coumadin will be discontinued, instead therapeutic lovenox once INR down to 2  trend lactic acid and monitor cre  Mg to be repleted  Case discussed with ICU PA

## 2022-10-12 NOTE — PROVIDER CONTACT NOTE (EICU) - BACKGROUND
73 y/o female with h/o obesity, HTN, HLD, a.fib on ac with coumadin and toprol xl for rate control, and PVD who presented to the ER with h/o RLE pain which was worsening and fever. Pt was instructed to come in for evaluation by her Vascular surgeon based on these symptoms. In the ER given IVF bolus for resuscitation but remained hypotensive so critical care called for evaluation.

## 2022-10-13 LAB
A1C WITH ESTIMATED AVERAGE GLUCOSE RESULT: 5.8 % — HIGH (ref 4–5.6)
ALBUMIN SERPL ELPH-MCNC: 2.7 G/DL — LOW (ref 3.3–5)
ALP SERPL-CCNC: 46 U/L — SIGNIFICANT CHANGE UP (ref 40–120)
ALT FLD-CCNC: 27 U/L — SIGNIFICANT CHANGE UP (ref 12–78)
ANION GAP SERPL CALC-SCNC: 6 MMOL/L — SIGNIFICANT CHANGE UP (ref 5–17)
APPEARANCE UR: CLEAR — SIGNIFICANT CHANGE UP
APTT BLD: 43.6 SEC — HIGH (ref 27.5–35.5)
APTT BLD: 47.3 SEC — HIGH (ref 27.5–35.5)
AST SERPL-CCNC: 19 U/L — SIGNIFICANT CHANGE UP (ref 15–37)
BILIRUB SERPL-MCNC: 1.2 MG/DL — SIGNIFICANT CHANGE UP (ref 0.2–1.2)
BILIRUB UR-MCNC: NEGATIVE — SIGNIFICANT CHANGE UP
BUN SERPL-MCNC: 13 MG/DL — SIGNIFICANT CHANGE UP (ref 7–23)
CALCIUM SERPL-MCNC: 8.6 MG/DL — SIGNIFICANT CHANGE UP (ref 8.5–10.1)
CHLORIDE SERPL-SCNC: 107 MMOL/L — SIGNIFICANT CHANGE UP (ref 96–108)
CO2 SERPL-SCNC: 26 MMOL/L — SIGNIFICANT CHANGE UP (ref 22–31)
COLOR SPEC: YELLOW — SIGNIFICANT CHANGE UP
CREAT SERPL-MCNC: 0.73 MG/DL — SIGNIFICANT CHANGE UP (ref 0.5–1.3)
DIFF PNL FLD: ABNORMAL
EGFR: 87 ML/MIN/1.73M2 — SIGNIFICANT CHANGE UP
ESTIMATED AVERAGE GLUCOSE: 120 MG/DL — HIGH (ref 68–114)
GLUCOSE BLDC GLUCOMTR-MCNC: 160 MG/DL — HIGH (ref 70–99)
GLUCOSE BLDC GLUCOMTR-MCNC: 169 MG/DL — HIGH (ref 70–99)
GLUCOSE BLDC GLUCOMTR-MCNC: 181 MG/DL — HIGH (ref 70–99)
GLUCOSE BLDC GLUCOMTR-MCNC: 92 MG/DL — SIGNIFICANT CHANGE UP (ref 70–99)
GLUCOSE SERPL-MCNC: 100 MG/DL — HIGH (ref 70–99)
GLUCOSE UR QL: NEGATIVE — SIGNIFICANT CHANGE UP
GRAM STN FLD: SIGNIFICANT CHANGE UP
HCT VFR BLD CALC: 42 % — SIGNIFICANT CHANGE UP (ref 34.5–45)
HGB BLD-MCNC: 13.7 G/DL — SIGNIFICANT CHANGE UP (ref 11.5–15.5)
INR BLD: 3.43 RATIO — HIGH (ref 0.88–1.16)
INR BLD: 4.09 RATIO — HIGH (ref 0.88–1.16)
KETONES UR-MCNC: NEGATIVE — SIGNIFICANT CHANGE UP
LEUKOCYTE ESTERASE UR-ACNC: NEGATIVE — SIGNIFICANT CHANGE UP
MAGNESIUM SERPL-MCNC: 2.4 MG/DL — SIGNIFICANT CHANGE UP (ref 1.6–2.6)
MCHC RBC-ENTMCNC: 31.7 PG — SIGNIFICANT CHANGE UP (ref 27–34)
MCHC RBC-ENTMCNC: 32.6 GM/DL — SIGNIFICANT CHANGE UP (ref 32–36)
MCV RBC AUTO: 97.2 FL — SIGNIFICANT CHANGE UP (ref 80–100)
NITRITE UR-MCNC: NEGATIVE — SIGNIFICANT CHANGE UP
PH UR: 6.5 — SIGNIFICANT CHANGE UP (ref 5–8)
PHOSPHATE SERPL-MCNC: 3.2 MG/DL — SIGNIFICANT CHANGE UP (ref 2.5–4.5)
PLATELET # BLD AUTO: 251 K/UL — SIGNIFICANT CHANGE UP (ref 150–400)
POTASSIUM SERPL-MCNC: 4 MMOL/L — SIGNIFICANT CHANGE UP (ref 3.5–5.3)
POTASSIUM SERPL-SCNC: 4 MMOL/L — SIGNIFICANT CHANGE UP (ref 3.5–5.3)
PROT SERPL-MCNC: 6.3 GM/DL — SIGNIFICANT CHANGE UP (ref 6–8.3)
PROT UR-MCNC: NEGATIVE — SIGNIFICANT CHANGE UP
PROTHROM AB SERPL-ACNC: 40.3 SEC — HIGH (ref 10.5–13.4)
PROTHROM AB SERPL-ACNC: 48.1 SEC — HIGH (ref 10.5–13.4)
RBC # BLD: 4.32 M/UL — SIGNIFICANT CHANGE UP (ref 3.8–5.2)
RBC # FLD: 13.5 % — SIGNIFICANT CHANGE UP (ref 10.3–14.5)
SODIUM SERPL-SCNC: 139 MMOL/L — SIGNIFICANT CHANGE UP (ref 135–145)
SP GR SPEC: 1 — LOW (ref 1.01–1.02)
SPECIMEN SOURCE: SIGNIFICANT CHANGE UP
UROBILINOGEN FLD QL: NEGATIVE — SIGNIFICANT CHANGE UP
WBC # BLD: 15.58 K/UL — HIGH (ref 3.8–10.5)
WBC # FLD AUTO: 15.58 K/UL — HIGH (ref 3.8–10.5)

## 2022-10-13 PROCEDURE — 99291 CRITICAL CARE FIRST HOUR: CPT

## 2022-10-13 RX ORDER — HYDROCORTISONE 20 MG
100 TABLET ORAL EVERY 8 HOURS
Refills: 0 | Status: DISCONTINUED | OUTPATIENT
Start: 2022-10-13 | End: 2022-10-14

## 2022-10-13 RX ORDER — GABAPENTIN 400 MG/1
800 CAPSULE ORAL THREE TIMES A DAY
Refills: 0 | Status: DISCONTINUED | OUTPATIENT
Start: 2022-10-13 | End: 2022-10-27

## 2022-10-13 RX ORDER — HYDROMORPHONE HYDROCHLORIDE 2 MG/ML
0.5 INJECTION INTRAMUSCULAR; INTRAVENOUS; SUBCUTANEOUS EVERY 4 HOURS
Refills: 0 | Status: DISCONTINUED | OUTPATIENT
Start: 2022-10-13 | End: 2022-10-20

## 2022-10-13 RX ORDER — FUROSEMIDE 40 MG
2 TABLET ORAL
Qty: 0 | Refills: 0 | DISCHARGE

## 2022-10-13 RX ORDER — VANCOMYCIN HCL 1 G
1500 VIAL (EA) INTRAVENOUS EVERY 12 HOURS
Refills: 0 | Status: DISCONTINUED | OUTPATIENT
Start: 2022-10-13 | End: 2022-10-15

## 2022-10-13 RX ORDER — CHOLESTYRAMINE 4 G/9G
1 POWDER, FOR SUSPENSION ORAL
Qty: 0 | Refills: 0 | DISCHARGE

## 2022-10-13 RX ORDER — CEFEPIME 1 G/1
2000 INJECTION, POWDER, FOR SOLUTION INTRAMUSCULAR; INTRAVENOUS EVERY 12 HOURS
Refills: 0 | Status: DISCONTINUED | OUTPATIENT
Start: 2022-10-13 | End: 2022-10-17

## 2022-10-13 RX ORDER — METOPROLOL TARTRATE 50 MG
1 TABLET ORAL
Qty: 0 | Refills: 0 | DISCHARGE

## 2022-10-13 RX ADMIN — Medication 100 MILLIGRAM(S): at 12:54

## 2022-10-13 RX ADMIN — HYDROMORPHONE HYDROCHLORIDE 0.5 MILLIGRAM(S): 2 INJECTION INTRAMUSCULAR; INTRAVENOUS; SUBCUTANEOUS at 12:25

## 2022-10-13 RX ADMIN — CEFEPIME 100 MILLIGRAM(S): 1 INJECTION, POWDER, FOR SOLUTION INTRAMUSCULAR; INTRAVENOUS at 21:57

## 2022-10-13 RX ADMIN — CEFEPIME 100 MILLIGRAM(S): 1 INJECTION, POWDER, FOR SOLUTION INTRAMUSCULAR; INTRAVENOUS at 10:10

## 2022-10-13 RX ADMIN — Medication 300 MILLIGRAM(S): at 21:58

## 2022-10-13 RX ADMIN — HYDROMORPHONE HYDROCHLORIDE 0.5 MILLIGRAM(S): 2 INJECTION INTRAMUSCULAR; INTRAVENOUS; SUBCUTANEOUS at 20:25

## 2022-10-13 RX ADMIN — Medication 2: at 22:00

## 2022-10-13 RX ADMIN — Medication 2: at 17:25

## 2022-10-13 RX ADMIN — CEFEPIME 100 MILLIGRAM(S): 1 INJECTION, POWDER, FOR SOLUTION INTRAMUSCULAR; INTRAVENOUS at 00:54

## 2022-10-13 RX ADMIN — HYDROMORPHONE HYDROCHLORIDE 0.5 MILLIGRAM(S): 2 INJECTION INTRAMUSCULAR; INTRAVENOUS; SUBCUTANEOUS at 01:22

## 2022-10-13 RX ADMIN — Medication 25 GRAM(S): at 00:53

## 2022-10-13 RX ADMIN — Medication 250 MILLIGRAM(S): at 10:10

## 2022-10-13 RX ADMIN — SIMVASTATIN 10 MILLIGRAM(S): 20 TABLET, FILM COATED ORAL at 22:34

## 2022-10-13 RX ADMIN — GABAPENTIN 800 MILLIGRAM(S): 400 CAPSULE ORAL at 14:42

## 2022-10-13 RX ADMIN — Medication 650 MILLIGRAM(S): at 05:43

## 2022-10-13 RX ADMIN — Medication 2: at 11:35

## 2022-10-13 RX ADMIN — HYDROMORPHONE HYDROCHLORIDE 0.5 MILLIGRAM(S): 2 INJECTION INTRAMUSCULAR; INTRAVENOUS; SUBCUTANEOUS at 20:10

## 2022-10-13 RX ADMIN — Medication 100 MILLIGRAM(S): at 21:57

## 2022-10-13 RX ADMIN — Medication 25 GRAM(S): at 02:30

## 2022-10-13 RX ADMIN — GABAPENTIN 800 MILLIGRAM(S): 400 CAPSULE ORAL at 05:32

## 2022-10-13 RX ADMIN — HYDROMORPHONE HYDROCHLORIDE 0.5 MILLIGRAM(S): 2 INJECTION INTRAMUSCULAR; INTRAVENOUS; SUBCUTANEOUS at 05:45

## 2022-10-13 RX ADMIN — HYDROMORPHONE HYDROCHLORIDE 0.5 MILLIGRAM(S): 2 INJECTION INTRAMUSCULAR; INTRAVENOUS; SUBCUTANEOUS at 05:32

## 2022-10-13 RX ADMIN — Medication 100 MILLIGRAM(S): at 10:10

## 2022-10-13 RX ADMIN — Medication 100 MILLIGRAM(S): at 14:42

## 2022-10-13 RX ADMIN — Medication 650 MILLIGRAM(S): at 06:40

## 2022-10-13 RX ADMIN — HYDROMORPHONE HYDROCHLORIDE 0.5 MILLIGRAM(S): 2 INJECTION INTRAMUSCULAR; INTRAVENOUS; SUBCUTANEOUS at 01:35

## 2022-10-13 RX ADMIN — GABAPENTIN 800 MILLIGRAM(S): 400 CAPSULE ORAL at 21:57

## 2022-10-13 RX ADMIN — Medication 100 MILLIGRAM(S): at 21:56

## 2022-10-13 RX ADMIN — HYDROMORPHONE HYDROCHLORIDE 0.5 MILLIGRAM(S): 2 INJECTION INTRAMUSCULAR; INTRAVENOUS; SUBCUTANEOUS at 12:09

## 2022-10-13 RX ADMIN — SODIUM CHLORIDE 75 MILLILITER(S): 9 INJECTION, SOLUTION INTRAVENOUS at 00:57

## 2022-10-13 NOTE — PROGRESS NOTE ADULT - NEUROLOGICAL
negative cranial nerves II-XII intact/sensation intact/responds to pain/responds to verbal commands/no spontaneous movement

## 2022-10-13 NOTE — DIETITIAN INITIAL EVALUATION ADULT - ADD RECOMMEND
1) Encourage protein-rich foods, maximize food preferences, 2) If PO intake does not improve, add premier protein shake BID, 3) MVI w/ minerals daily to ensure 100% RDA met, 4) Monitor bowel movements, if no BM for >3 days, consider implementing bowel regimen, 5) daily wts to track/trend changes, 6) monitor daily lytes/ min and replete prn, 7) Obtain vitamin D 25OH level to assess nutriture. RD will continue to monitor PO intake, labs, hydration, and wt prn.

## 2022-10-13 NOTE — CONSULT NOTE ADULT - SUBJECTIVE AND OBJECTIVE BOX
Patient is a 72y old  Female who presents with a chief complaint of Sepsis     HPI:  71 y/o female h/o obesity, Afib on coumadin, HFpEF, DM2, peripheral neuropathy, PVD, HTN, HLD, chronic b/l legs venous insufficiency, UC was admitted on 10/12 for fever and right lower extremity redness, swelling and pain. Patient called her vascular surgeons ( Dr. Yoo) office, who instructed her to come to the ER. Patient states the pain in her RLE worsened over the past 2 days PTA. She admits to increased fatigue and fever. She denies any SOB, CP, abdominal pain, N/V. In ER she was noted hypotensive and met sepsis criteria, given 30cc/kg, started on phenylephrine and received clindamycin, vancomycin IV and cefepime.     PMH: as above  PSH: as above  Meds: per reconciliation sheet, noted below  MEDICATIONS  (STANDING):  budesonide 160 MICROgram(s)/formoterol 4.5 MICROgram(s) Inhaler 2 Puff(s) Inhalation two times a day  cefepime   IVPB 2000 milliGRAM(s) IV Intermittent every 8 hours  cefepime   IVPB      clindamycin IVPB 900 milliGRAM(s) IV Intermittent once  dextrose 5%. 1000 milliLiter(s) (50 mL/Hr) IV Continuous <Continuous>  dextrose 5%. 1000 milliLiter(s) (100 mL/Hr) IV Continuous <Continuous>  dextrose 50% Injectable 25 Gram(s) IV Push once  dextrose 50% Injectable 12.5 Gram(s) IV Push once  dextrose 50% Injectable 25 Gram(s) IV Push once  gabapentin 800 milliGRAM(s) Oral three times a day  glucagon  Injectable 1 milliGRAM(s) IntraMuscular once  hydrocortisone sodium succinate Injectable 100 milliGRAM(s) IV Push every 8 hours  insulin lispro (ADMELOG) corrective regimen sliding scale   SubCutaneous Before meals and at bedtime  lactated ringers. 1000 milliLiter(s) (75 mL/Hr) IV Continuous <Continuous>  phenylephrine    Infusion 0.5 MICROgram(s)/kG/Min (22.5 mL/Hr) IV Continuous <Continuous>  simvastatin 10 milliGRAM(s) Oral at bedtime  vancomycin  IVPB 1750 milliGRAM(s) IV Intermittent every 12 hours    MEDICATIONS  (PRN):  acetaminophen     Tablet .. 650 milliGRAM(s) Oral every 6 hours PRN Temp greater or equal to 38C (100.4F)  ALBUTerol    90 MICROgram(s) HFA Inhaler 2 Puff(s) Inhalation every 6 hours PRN Shortness of Breath and/or Wheezing  dextrose Oral Gel 15 Gram(s) Oral once PRN Blood Glucose LESS THAN 70 milliGRAM(s)/deciliter  HYDROmorphone  Injectable 0.5 milliGRAM(s) IV Push every 4 hours PRN Moderate Pain (4 - 6)    Allergies    penicillin (Hives)    Intolerances      Social: no smoking, no alcohol, no illegal drugs; no recent travel, no exposure to TB  FAMILY HISTORY:  Family history of breast cancer in mother    Family history of diabetes mellitus in mother      no history of premature cardiovascular disease in first degree relatives    ROS: the patient denies fever, no chills, no HA, no seizures, no dizziness, no sore throat, no nasal congestion, no blurry vision, no CP, no palpitations, no SOB, no cough, no abdominal pain, no diarrhea, no N/V, no dysuria, has right leg pain, no claudication, has right leg rash, no joint aches, no rectal pain or bleeding, no night sweats  All other systems reviewed and are negative    Vital Signs Last 24 Hrs  T(C): 36.9 (13 Oct 2022 08:00), Max: 38.7 (12 Oct 2022 19:28)  T(F): 98.5 (13 Oct 2022 08:00), Max: 101.6 (12 Oct 2022 19:28)  HR: 95 (13 Oct 2022 09:00) (95 - 126)  BP: 106/60 (13 Oct 2022 09:00) (85/52 - 137/83)  BP(mean): 72 (13 Oct 2022 09:00) (54 - 89)  RR: 20 (13 Oct 2022 09:00) (15 - 24)  SpO2: 93% (13 Oct 2022 09:00) (91% - 98%)    Parameters below as of 13 Oct 2022 09:00  Patient On (Oxygen Delivery Method): room air      Daily Height in cm: 177.8 (12 Oct 2022 19:17)    Daily Weight in k.3 (13 Oct 2022 06:00)    PE:    Constitutional:  No acute distress  HEENT: NC/AT, EOMI, PERRLA, conjunctivae clear; ears and nose atraumatic; pharynx benign  Neck: supple; thyroid not palpable  Back: no tenderness  Respiratory: respiratory effort normal; clear to auscultation  Cardiovascular: S1S2 regular, no murmurs  Abdomen: soft, not tender, not distended, positive BS; no liver or spleen organomegaly  Genitourinary: no suprapubic tenderness  Lymphatic: no LN palpable  Musculoskeletal: no muscle tenderness, no joint swelling or tenderness  Extremities: no pedal edema  Neurological/ Psychiatric: AxOx3, judgement and insight normal; moving all extremities  Skin: no rashes; no palpable lesions    Labs: all available labs reviewed                        13.7   15.58 )-----------( 251      ( 13 Oct 2022 05:44 )             42.0     10-13    139  |  107  |  13  ----------------------------<  100<H>  4.0   |  26  |  0.73    Ca    8.6      13 Oct 2022 05:44  Phos  3.2     10-13  Mg     2.4     10-13    TPro  6.3  /  Alb  2.7<L>  /  TBili  1.2  /  DBili  x   /  AST  19  /  ALT  27  /  AlkPhos  46  10-13     LIVER FUNCTIONS - ( 13 Oct 2022 05:44 )  Alb: 2.7 g/dL / Pro: 6.3 gm/dL / ALK PHOS: 46 U/L / ALT: 27 U/L / AST: 19 U/L / GGT: x           Urinalysis Basic - ( 13 Oct 2022 02:45 )    Color: Yellow / Appearance: Clear / S.005 / pH: x  Gluc: x / Ketone: Negative  / Bili: Negative / Urobili: Negative   Blood: x / Protein: Negative / Nitrite: Negative   Leuk Esterase: Negative / RBC: 3-5 /HPF / WBC 0-2   Sq Epi: x / Non Sq Epi: Occasional / Bacteria: Negative      (10-12 @ 20:26)  NotDete      Radiology: all available radiological tests reviewed    Advanced directives addressed: full resuscitation

## 2022-10-13 NOTE — PROGRESS NOTE ADULT - ASSESSMENT
73 y/o female with pmhx of afib on coumadin, HFpEF, peripheral neuroapthy, DM II, PVD, HTN, HLD admitted on 11/12 for RLE pain admitted with:      1. septic shock  2. RLE cellulitis  3. GNR bacteremia  4. elevated INR    - hold coumadin until repeat INR  - vanco, cefepime, and clinda. f/u speciation  - stress dose steroids  - activel ytitrating phenylephrine gtt for goal MAP 65-70.  - CT without any obvious collection or signs of osteomyelitis but may anahy up needing MRI

## 2022-10-13 NOTE — PROGRESS NOTE ADULT - SUBJECTIVE AND OBJECTIVE BOX
Patient is a 72y old  Female who presents with a chief complaint of Cellulitis of extremity     (13 Oct 2022 11:53)      BRIEF HOSPITAL COURSE: 71 y/o female with pmhx of afib on coumadin, HFpEF, peripheral neuroapthy, DM II, PVD, HTN, HLD admitted on  for RLE pain admitted with septic shock due to RLE cellulitis     Events last 24 hours: blood cultures with GNR    PAST MEDICAL & SURGICAL HISTORY:  Diabetes mellitus type II, controlled      Atrial fibrillation      Congestive heart failure      Dyspnea      Morbid obesity with BMI of 40.0-44.9, adult      Neuropathy      Peripheral venous insufficiency      Thyroid nodule      HTN (hypertension)      Cellulitis      History of esophagogastroduodenoscopy (EGD)      H/O colonoscopy      Other complications of gastric band procedure      S/P left knee arthroscopy      Status post medial meniscus repair          Review of Systems:  CONSTITUTIONAL: No fever, chills, or fatigue  EYES: No eye pain, visual disturbances, or discharge  ENMT:  No difficulty hearing, tinnitus, vertigo; No sinus or throat pain  NECK: No pain or stiffness  RESPIRATORY: No cough, wheezing, chills or hemoptysis; No shortness of breath  CARDIOVASCULAR: No chest pain, palpitations, dizziness, or leg swelling  GASTROINTESTINAL: No abdominal or epigastric pain. No nausea, vomiting, or hematemesis; No diarrhea or constipation. No melena or hematochezia.  GENITOURINARY: No dysuria, frequency, hematuria, or incontinence  NEUROLOGICAL: No headaches, memory loss, loss of strength, numbness, or tremors  SKIN: No itching, burning, rashes, or lesions   MUSCULOSKELETAL: No joint pain or swelling; No muscle, back, or extremity pain  PSYCHIATRIC: No depression, anxiety, mood swings, or difficulty sleeping      Medications:  cefepime   IVPB 2000 milliGRAM(s) IV Intermittent every 12 hours  clindamycin IVPB 900 milliGRAM(s) IV Intermittent every 8 hours  vancomycin  IVPB 1500 milliGRAM(s) IV Intermittent every 12 hours    phenylephrine    Infusion 0.5 MICROgram(s)/kG/Min IV Continuous <Continuous>    ALBUTerol    90 MICROgram(s) HFA Inhaler 2 Puff(s) Inhalation every 6 hours PRN  budesonide 160 MICROgram(s)/formoterol 4.5 MICROgram(s) Inhaler 2 Puff(s) Inhalation two times a day    acetaminophen     Tablet .. 650 milliGRAM(s) Oral every 6 hours PRN  gabapentin 800 milliGRAM(s) Oral three times a day  HYDROmorphone  Injectable 0.5 milliGRAM(s) IV Push every 4 hours PRN            dextrose 50% Injectable 25 Gram(s) IV Push once  dextrose 50% Injectable 12.5 Gram(s) IV Push once  dextrose 50% Injectable 25 Gram(s) IV Push once  dextrose Oral Gel 15 Gram(s) Oral once PRN  glucagon  Injectable 1 milliGRAM(s) IntraMuscular once  hydrocortisone sodium succinate Injectable 100 milliGRAM(s) IV Push every 8 hours  insulin lispro (ADMELOG) corrective regimen sliding scale   SubCutaneous Before meals and at bedtime  simvastatin 10 milliGRAM(s) Oral at bedtime    dextrose 5%. 1000 milliLiter(s) IV Continuous <Continuous>  dextrose 5%. 1000 milliLiter(s) IV Continuous <Continuous>  lactated ringers. 1000 milliLiter(s) IV Continuous <Continuous>                ICU Vital Signs Last 24 Hrs  T(C): 37.1 (13 Oct 2022 20:00), Max: 37.8 (13 Oct 2022 06:00)  T(F): 98.7 (13 Oct 2022 20:00), Max: 100.1 (13 Oct 2022 06:00)  HR: 116 (13 Oct 2022 22:00) (88 - 121)  BP: 112/47 (13 Oct 2022 22:00) (85/52 - 137/83)  BP(mean): 63 (13 Oct 2022 22:00) (54 - 89)  ABP: --  ABP(mean): --  RR: 21 (13 Oct 2022 22:00) (13 - 25)  SpO2: 92% (13 Oct 2022 22:00) (91% - 98%)    O2 Parameters below as of 13 Oct 2022 22:00  Patient On (Oxygen Delivery Method): room air                I&O's Detail    12 Oct 2022 07:01  -  13 Oct 2022 07:00  --------------------------------------------------------  IN:    IV PiggyBack: 750 mL    Lactated Ringers: 315 mL    Oral Fluid: 100 mL    Phenylephrine: 98 mL  Total IN: 1263 mL    OUT:    Incontinent per Collection Bag (mL): 1550 mL  Total OUT: 1550 mL    Total NET: -287 mL      13 Oct 2022 07:01  -  13 Oct 2022 22:53  --------------------------------------------------------  IN:    IV PiggyBack: 706 mL    Lactated Ringers: 930 mL    Phenylephrine: 137 mL  Total IN: 1773 mL    OUT:    Incontinent per Collection Bag (mL): 1350 mL  Total OUT: 1350 mL    Total NET: 423 mL            LABS:                        13.7   15.58 )-----------( 251      ( 13 Oct 2022 05:44 )             42.0     10-13    139  |  107  |  13  ----------------------------<  100<H>  4.0   |  26  |  0.73    Ca    8.6      13 Oct 2022 05:44  Phos  3.2     10-  Mg     2.4     10-13    TPro  6.3  /  Alb  2.7<L>  /  TBili  1.2  /  DBili  x   /  AST  19  /  ALT  27  /  AlkPhos  46  10-13          CAPILLARY BLOOD GLUCOSE      POCT Blood Glucose.: 169 mg/dL (13 Oct 2022 22:00)    PT/INR - ( 13 Oct 2022 05:44 )   PT: 40.3 sec;   INR: 3.43 ratio         PTT - ( 13 Oct 2022 05:44 )  PTT:43.6 sec  Urinalysis Basic - ( 13 Oct 2022 02:45 )    Color: Yellow / Appearance: Clear / S.005 / pH: x  Gluc: x / Ketone: Negative  / Bili: Negative / Urobili: Negative   Blood: x / Protein: Negative / Nitrite: Negative   Leuk Esterase: Negative / RBC: 3-5 /HPF / WBC 0-2   Sq Epi: x / Non Sq Epi: Occasional / Bacteria: Negative      CULTURES:  Rapid RVP Result: Bubbatec (10-12 @ 20:26)  Culture Results:   Growth in aerobic bottle: Gram Negative Rods  ***Blood Panel PCR results on this specimen are available  approximately 3 hours after the Gram stain result.***  Gram stain, PCR, and/or culture results may not always  correspond due to difference in methodologies.  ************************************************************  This PCR assay was performed by multiplex PCR. This  Assay tests for 66 bacterial and resistance gene targets.  Please refer to the [facility] test directory  at [email or web address] for details. (10-12 @ 20:26)      Physical Examination:    General: No acute distress.      HEENT: Pupils equal, reactive to light.  Symmetric.    PULM: Clear to auscultation bilaterally, no significant sputum production    NECK: Supple, no lymphadenopathy, trachea midline    CVS: Regular rate and rhythm, no murmurs, rubs, or gallops    ABD: Soft, nondistended, nontender, normoactive bowel sounds, no masses    EXT: No edema, nontender    SKIN: Warm and well perfused, no rashes noted.    NEURO: Alert, oriented, interactive, nonfocal    DEVICES:     RADIOLOGY:   IMPRESSION:    Diffuse stranding and skin thickening in the right and visualized left   lower extremity soft tissue, which may represent edema and/or cellulitis.   No discrete organized fluid collection in the visualized soft tissue. No   obvious bone erosion or periosteal reaction. If there is continued   clinical suspicion for soft tissue/bone infection, additional imaging   (MRI and/or nuclear scan) may be obtained for further evaluation.    --- End of Report ---            YOGESH HAMILTON MD; Attending Radiologist      CRITICAL CARE TIME SPENT: 33 minutes of critical care time spent providing medical care for patient's acute illness/conditions that impairs at least one vital organ system and/or poses a high risk of imminent or life threatening deterioration in the patient's condition. It includes time spent evaluating and treating the patient's acute illness as well as time spent reviewing labs, radiology, discussing goals of care with patient and/or patient's family, and discussing the case with a multidisciplinary team in an effort to prevent further life threatening deterioration or end organ damage. This time is independent of any procedures performed.

## 2022-10-13 NOTE — PROGRESS NOTE ADULT - SKIN COMMENTS
RLE - chronic venous changes, extremely tender, warm, dark red, 2 open wounds on medial and 1 open wound on lateral aspect with malodorous discharge. LLE - chronic changes, warm, minimal tenderness, no open wound

## 2022-10-13 NOTE — DIETITIAN INITIAL EVALUATION ADULT - PERTINENT MEDS FT
MEDICATIONS  (STANDING):  budesonide 160 MICROgram(s)/formoterol 4.5 MICROgram(s) Inhaler 2 Puff(s) Inhalation two times a day  cefepime   IVPB 2000 milliGRAM(s) IV Intermittent every 12 hours  clindamycin IVPB 900 milliGRAM(s) IV Intermittent every 8 hours  clindamycin IVPB 900 milliGRAM(s) IV Intermittent once  dextrose 5%. 1000 milliLiter(s) (50 mL/Hr) IV Continuous <Continuous>  dextrose 5%. 1000 milliLiter(s) (100 mL/Hr) IV Continuous <Continuous>  dextrose 50% Injectable 25 Gram(s) IV Push once  dextrose 50% Injectable 12.5 Gram(s) IV Push once  dextrose 50% Injectable 25 Gram(s) IV Push once  gabapentin 800 milliGRAM(s) Oral three times a day  glucagon  Injectable 1 milliGRAM(s) IntraMuscular once  hydrocortisone sodium succinate Injectable 100 milliGRAM(s) IV Push every 8 hours  insulin lispro (ADMELOG) corrective regimen sliding scale   SubCutaneous Before meals and at bedtime  lactated ringers. 1000 milliLiter(s) (75 mL/Hr) IV Continuous <Continuous>  phenylephrine    Infusion 0.5 MICROgram(s)/kG/Min (22.5 mL/Hr) IV Continuous <Continuous>  simvastatin 10 milliGRAM(s) Oral at bedtime  vancomycin  IVPB 1500 milliGRAM(s) IV Intermittent every 12 hours    MEDICATIONS  (PRN):  acetaminophen     Tablet .. 650 milliGRAM(s) Oral every 6 hours PRN Temp greater or equal to 38C (100.4F)  ALBUTerol    90 MICROgram(s) HFA Inhaler 2 Puff(s) Inhalation every 6 hours PRN Shortness of Breath and/or Wheezing  dextrose Oral Gel 15 Gram(s) Oral once PRN Blood Glucose LESS THAN 70 milliGRAM(s)/deciliter  HYDROmorphone  Injectable 0.5 milliGRAM(s) IV Push every 4 hours PRN Moderate Pain (4 - 6)

## 2022-10-13 NOTE — DIETITIAN INITIAL EVALUATION ADULT - OTHER CALCULATIONS
energy and fluid needs calculated using adjusted BW; protein needs calculated using IBW   IBW = 150#

## 2022-10-13 NOTE — PROGRESS NOTE ADULT - SUBJECTIVE AND OBJECTIVE BOX
Patient is a 72y old  Female who presents with a chief complaint of Sepsis (13 Oct 2022 03:13)    24 hour events:     PAST MEDICAL & SURGICAL HISTORY:  Diabetes mellitus type II, controlled      Atrial fibrillation      Congestive heart failure      Dyspnea      Morbid obesity with BMI of 40.0-44.9, adult      Neuropathy      Peripheral venous insufficiency      Thyroid nodule      HTN (hypertension)      Cellulitis      History of esophagogastroduodenoscopy (EGD)      H/O colonoscopy      Other complications of gastric band procedure      S/P left knee arthroscopy      Status post medial meniscus repair          Allergies    penicillin (Hives)    Intolerances      REVIEW OF SYSTEMS: SEE BELOW       ICU Vital Signs Last 24 Hrs  T(C): 36.9 (13 Oct 2022 08:00), Max: 38.7 (12 Oct 2022 19:28)  T(F): 98.5 (13 Oct 2022 08:00), Max: 101.6 (12 Oct 2022 19:28)  HR: 95 (13 Oct 2022 09:00) (95 - 126)  BP: 106/60 (13 Oct 2022 09:00) (85/52 - 137/83)  BP(mean): 72 (13 Oct 2022 09:00) (54 - 89)  ABP: --  ABP(mean): --  RR: 20 (13 Oct 2022 09:00) (15 - 24)  SpO2: 93% (13 Oct 2022 09:00) (91% - 98%)    O2 Parameters below as of 13 Oct 2022 09:00  Patient On (Oxygen Delivery Method): room air            CAPILLARY BLOOD GLUCOSE      POCT Blood Glucose.: 92 mg/dL (13 Oct 2022 08:36)      I&O's Summary    12 Oct 2022 07:01  -  13 Oct 2022 07:00  --------------------------------------------------------  IN: 1263 mL / OUT: 1550 mL / NET: -287 mL            MEDICATIONS  (STANDING):  budesonide 160 MICROgram(s)/formoterol 4.5 MICROgram(s) Inhaler 2 Puff(s) Inhalation two times a day  cefepime   IVPB 2000 milliGRAM(s) IV Intermittent every 8 hours  cefepime   IVPB      dextrose 5%. 1000 milliLiter(s) (50 mL/Hr) IV Continuous <Continuous>  dextrose 5%. 1000 milliLiter(s) (100 mL/Hr) IV Continuous <Continuous>  dextrose 50% Injectable 25 Gram(s) IV Push once  dextrose 50% Injectable 12.5 Gram(s) IV Push once  dextrose 50% Injectable 25 Gram(s) IV Push once  gabapentin 800 milliGRAM(s) Oral three times a day  glucagon  Injectable 1 milliGRAM(s) IntraMuscular once  hydrocortisone sodium succinate Injectable 100 milliGRAM(s) IV Push every 8 hours  insulin lispro (ADMELOG) corrective regimen sliding scale   SubCutaneous Before meals and at bedtime  lactated ringers. 1000 milliLiter(s) (75 mL/Hr) IV Continuous <Continuous>  phenylephrine    Infusion 0.5 MICROgram(s)/kG/Min (22.5 mL/Hr) IV Continuous <Continuous>  simvastatin 10 milliGRAM(s) Oral at bedtime  vancomycin  IVPB 1750 milliGRAM(s) IV Intermittent every 12 hours      MEDICATIONS  (PRN):  acetaminophen     Tablet .. 650 milliGRAM(s) Oral every 6 hours PRN Temp greater or equal to 38C (100.4F)  ALBUTerol    90 MICROgram(s) HFA Inhaler 2 Puff(s) Inhalation every 6 hours PRN Shortness of Breath and/or Wheezing  dextrose Oral Gel 15 Gram(s) Oral once PRN Blood Glucose LESS THAN 70 milliGRAM(s)/deciliter  HYDROmorphone  Injectable 0.5 milliGRAM(s) IV Push every 4 hours PRN Moderate Pain (4 - 6)      PHYSICAL EXAM: SEE BELOW                          13.7   15.58 )-----------( 251      ( 13 Oct 2022 05:44 )             42.0       10-13    139  |  107  |  13  ----------------------------<  100<H>  4.0   |  26  |  0.73    Ca    8.6      13 Oct 2022 05:44  Phos  3.2     10-13  Mg     2.4     10-13    TPro  6.3  /  Alb  2.7<L>  /  TBili  1.2  /  DBili  x   /  AST  19  /  ALT  27  /  AlkPhos  46  10-13    Lactate 2.1           10-13 @ 00:46    Lactate 3.2           10-12 @ 20:26          PT/INR - ( 13 Oct 2022 05:44 )   PT: 40.3 sec;   INR: 3.43 ratio         PTT - ( 13 Oct 2022 05:44 )  PTT:43.6 sec  Urinalysis Basic - ( 13 Oct 2022 02:45 )    Color: Yellow / Appearance: Clear / S.005 / pH: x  Gluc: x / Ketone: Negative  / Bili: Negative / Urobili: Negative   Blood: x / Protein: Negative / Nitrite: Negative   Leuk Esterase: Negative / RBC: 3-5 /HPF / WBC 0-2   Sq Epi: x / Non Sq Epi: Occasional / Bacteria: Negative          Rapid RVP Result: NotDetec (10-12 @ 20:26)

## 2022-10-13 NOTE — ADVANCED PRACTICE NURSE CONSULT - RECOMMEDATIONS
1)Continue to Elevate heels off of Mattress  2)Continue to Turn and position every 2 Hours  3)Consult Dietitian   4)No Adhesives to lower extremity

## 2022-10-13 NOTE — DIETITIAN INITIAL EVALUATION ADULT - PERTINENT LABORATORY DATA
10-13    139  |  107  |  13  ----------------------------<  100<H>  4.0   |  26  |  0.73    Ca    8.6      13 Oct 2022 05:44  Phos  3.2     10-13  Mg     2.4     10-13    TPro  6.3  /  Alb  2.7<L>  /  TBili  1.2  /  DBili  x   /  AST  19  /  ALT  27  /  AlkPhos  46  10-13  POCT Blood Glucose.: 160 mg/dL (10-13-22 @ 11:23)  A1C with Estimated Average Glucose Result: 5.8 % (10-13-22 @ 05:44)  A1C with Estimated Average Glucose Result: 5.8 % (05-27-22 @ 05:07)

## 2022-10-13 NOTE — PATIENT PROFILE ADULT - FALL HARM RISK - HARM RISK INTERVENTIONS

## 2022-10-13 NOTE — CONSULT NOTE ADULT - SUBJECTIVE AND OBJECTIVE BOX
HPI:  71yo F w/ Afib (on coumadin), HFpEF, DM, peripheral neuropathy, chronic PVD, HTN, HLD, obesity presents w/ fever and right lower extremity swelling and pain. Patient follows Dr. Yoo and called him for the finding and was recommended to come to ED. Patient endorses that patient noticed the swelling and pain approximately 2 days ago, and patient does not feel well. No other active complaints including n/v, abd pain.  In ED patient underwent blood labs and imaging which showed profound cellulitis of right lower extremity. Patient was also septic with afib RVR, is admitted to ICU. Pain extends anteriorly up to proximal shin and posteriorly to mid-thigh. Able to move and feel sensations but mostly pain and also noticed discoloration. Denies chest pain, shortness of breath    PAST MEDICAL & SURGICAL HISTORY:  Diabetes mellitus type II, controlled      Atrial fibrillation      Congestive heart failure      Dyspnea      Morbid obesity with BMI of 40.0-44.9, adult      Neuropathy      Peripheral venous insufficiency      Thyroid nodule      HTN (hypertension)      Cellulitis      History of esophagogastroduodenoscopy (EGD)      H/O colonoscopy      Other complications of gastric band procedure      S/P left knee arthroscopy      Status post medial meniscus repair          MEDICATIONS  (STANDING):  budesonide 160 MICROgram(s)/formoterol 4.5 MICROgram(s) Inhaler 2 Puff(s) Inhalation two times a day  cefepime   IVPB 2000 milliGRAM(s) IV Intermittent every 8 hours  cefepime   IVPB      dextrose 5%. 1000 milliLiter(s) (50 mL/Hr) IV Continuous <Continuous>  dextrose 5%. 1000 milliLiter(s) (100 mL/Hr) IV Continuous <Continuous>  dextrose 50% Injectable 25 Gram(s) IV Push once  dextrose 50% Injectable 12.5 Gram(s) IV Push once  dextrose 50% Injectable 25 Gram(s) IV Push once  gabapentin 800 milliGRAM(s) Oral three times a day  glucagon  Injectable 1 milliGRAM(s) IntraMuscular once  insulin lispro (ADMELOG) corrective regimen sliding scale   SubCutaneous Before meals and at bedtime  lactated ringers. 1000 milliLiter(s) (75 mL/Hr) IV Continuous <Continuous>  phenylephrine    Infusion 0.5 MICROgram(s)/kG/Min (22.5 mL/Hr) IV Continuous <Continuous>  simvastatin 10 milliGRAM(s) Oral at bedtime  vancomycin  IVPB 1750 milliGRAM(s) IV Intermittent every 12 hours    MEDICATIONS  (PRN):  acetaminophen     Tablet .. 650 milliGRAM(s) Oral every 6 hours PRN Temp greater or equal to 38C (100.4F)  ALBUTerol    90 MICROgram(s) HFA Inhaler 2 Puff(s) Inhalation every 6 hours PRN Shortness of Breath and/or Wheezing  dextrose Oral Gel 15 Gram(s) Oral once PRN Blood Glucose LESS THAN 70 milliGRAM(s)/deciliter  HYDROmorphone  Injectable 0.5 milliGRAM(s) IV Push every 4 hours PRN Moderate Pain (4 - 6)      Allergies    penicillin (Hives)    Intolerances        SOCIAL HISTORY:    FAMILY HISTORY:  Family history of breast cancer in mother    Family history of diabetes mellitus in mother            PHYSICAL EXAM:  GENERAL: NAD, AOx3, well developed  HEAD:  Atraumatic, Normocephalic  EYES: EOMI, conjunctiva and sclera clear  NECK: Supple, No JVD  CHEST/LUNG: Unlabored respirations b/l  HEART: S1 and S2 normal  ABDOMEN: soft, nondistended, nontender  EXTREMITIES:  dopplerable R. DP/PT. RLE with discoloration and swelling with medial shin ulcer. Profound edema up to knee. motor and sensation intact. tenderness to palpation  NERVOUS SYSTEM:  AO X3, speech clear. No deficits   MSK: full ROM, no deformities  SKIN: warm to touch. No rashes or lesions     Vital Signs Last 24 Hrs  T(C): 36.9 (13 Oct 2022 00:04), Max: 38.7 (12 Oct 2022 19:28)  T(F): 98.5 (13 Oct 2022 00:04), Max: 101.6 (12 Oct 2022 19:28)  HR: 115 (13 Oct 2022 02:35) (96 - 126)  BP: 88/50 (13 Oct 2022 02:35) (85/52 - 137/83)  BP(mean): 59 (13 Oct 2022 02:35) (56 - 88)  RR: 19 (13 Oct 2022 02:35) (16 - 24)  SpO2: 94% (13 Oct 2022 02:35) (91% - 98%)    Parameters below as of 13 Oct 2022 02:00  Patient On (Oxygen Delivery Method): room air        I&O's Summary    12 Oct 2022 07:01  -  13 Oct 2022 03:14  --------------------------------------------------------  IN: 650 mL / OUT: 250 mL / NET: 400 mL            LABS:                        14.1   17.78 )-----------( 238      ( 12 Oct 2022 20:26 )             43.3     10-12    138  |  107  |  24<H>  ----------------------------<  139<H>  4.4   |  24  |  1.14    Ca    8.6      12 Oct 2022 20:26  Mg     1.4     10-12    TPro  6.5  /  Alb  2.9<L>  /  TBili  0.4  /  DBili  x   /  AST  12<L>  /  ALT  26  /  AlkPhos  45  10-12    PT/INR - ( 13 Oct 2022 00:46 )   PT: 48.1 sec;   INR: 4.09 ratio         PTT - ( 13 Oct 2022 00:46 )  PTT:47.3 sec    CAPILLARY BLOOD GLUCOSE        LIVER FUNCTIONS - ( 12 Oct 2022 20:26 )  Alb: 2.9 g/dL / Pro: 6.5 gm/dL / ALK PHOS: 45 U/L / ALT: 26 U/L / AST: 12 U/L / GGT: x             Cultures:      RADIOLOGY & ADDITIONAL STUDIES:      Plan:        < from: CT Lower Extremity w/ IV Cont, Right (10.12.22 @ 22:34) >  IMPRESSION:    Diffuse stranding and skin thickening in the right and visualized left   lower extremity soft tissue, which may represent edema and/or cellulitis.   No discrete organized fluid collection in the visualized soft tissue. No   obvious bone erosion or periosteal reaction. If there is continued   clinical suspicion for soft tissue/bone infection, additional imaging   (MRI and/or nuclear scan) may be obtained for further evaluation.    < end of copied text >  < from: US Duplex Venous Lower Ext Ltd, Right (10.12.22 @ 22:06) >  IMPRESSION:    The right soleal vein was not visualized. No obvious thrombus in the   visualized right lower extremity deep veins.    < end of copied text >

## 2022-10-13 NOTE — CONSULT NOTE ADULT - ASSESSMENT
73yo F presents with b/l lower extremity cellulitis. Sepsis called  Febrile, leukocytosis with lactic acidosis  No DVT on venous duplex  CT demonstrated diffuse stranding and skin thickening of both left and right extremities likely cellulitis and edema    Recommendation:  - broad spectrum IV ABx  - judicious fluid resuscitation  - trend lactic acid  - continue ICU care for sepsis secondary to cellulitis infection    Plan discussed with vascular surgery attending, Dr. Yoo

## 2022-10-13 NOTE — CONSULT NOTE ADULT - ASSESSMENT
71 y/o female h/o obesity, Afib on coumadin, HFpEF, DM2, peripheral neuropathy, PVD, HTN, HLD, chronic b/l legs venous insufficiency, UC was admitted on 10/12 for fever and right lower extremity redness, swelling and pain. Patient called her vascular surgeons ( Dr. Yoo) office, who instructed her to come to the ER. Patient states the pain in her RLE worsened over the past 2 days PTA. She admits to increased fatigue and fever. She denies any SOB, CP, abdominal pain, N/V. In ER she was noted hypotensive and met sepsis criteria, given 30cc/kg, started on phenylephrine and received clindamycin, vancomycin IV and cefepime.     1. Febrile syndrome. Hypotensive. Likely sepsis. Extensive RLE cellulitis. B/L LE chronic stasis dermatitis. Obesity. Allergy to PCN.  -concern about toxic shock syndrome raised  -leukocytosis   -obtain BC x 2, urine c/s  -start vancomycin 1500 mg IV q12h, cefepime 2 gm IV q12h and clindamycin 900 mg IV q8h  -reason for abx use and side effects reviewed with patient; monitor BMP and vancomycin trough levels   -left leg erythema above chronic stasis   -leukocytosis   -elevate legs  -pain management  -elevate legs  -monitor temps  -f/u CBC  -supportive care  2. Other issues:   -care per medicine    d/w Dr. Collins

## 2022-10-13 NOTE — PROGRESS NOTE ADULT - ASSESSMENT
On vanc, cefepime   Consult ID  F/u cx   Send wound cx  On home prednisone 10mg daily, start stress steroid  Vascular sx f/u, may need intervention   INR 3.43, hold warfarin, repeat INR in am   Start diet  72F PMH chronic A.fib on warfarin (no DOAC due to cost), DM2 not on insulin, PVD/chronic venous stasis on chronic steroids, HTN, HLD, obesity, now with     Imp:         On vanc, cefepime   Consult ID  F/u cx   Send wound cx  On home prednisone 10mg daily, start stress steroid  Vascular sx f/u, may need intervention   INR 3.43, hold warfarin, repeat INR in am   Start diet  72F PMH chronic A.fib on warfarin (no DOAC due to cost), DM2 not on insulin, PVD/chronic venous stasis on chronic steroids, HTN, HLD, obesity, now with     Imp:     Septic shock from RLE cellulitis       Plan:     On vanc, cefepime, will add clindamycin for toxin control   Consult ID  F/u cx   Send wound cx  On home prednisone 10mg daily, start stress steroid  Wean Justus for goal MAP>65  Vascular sx f/u, may need intervention   INR 3.43, hold warfarin, repeat INR in am   Start diet     Patient critically ill with high risk for decompensation

## 2022-10-13 NOTE — DIETITIAN INITIAL EVALUATION ADULT - NSFNSGIIOFT_GEN_A_CORE
I&O's Detail    12 Oct 2022 07:01  -  13 Oct 2022 07:00  --------------------------------------------------------  IN:    IV PiggyBack: 750 mL    Lactated Ringers: 315 mL    Oral Fluid: 100 mL    Phenylephrine: 98 mL  Total IN: 1263 mL    OUT:    Incontinent per Collection Bag (mL): 1550 mL  Total OUT: 1550 mL    Total NET: -287 mL

## 2022-10-14 LAB
-  BLOOD PCR PANEL: SIGNIFICANT CHANGE UP
ANION GAP SERPL CALC-SCNC: 5 MMOL/L — SIGNIFICANT CHANGE UP (ref 5–17)
APTT BLD: 39.8 SEC — HIGH (ref 27.5–35.5)
BASOPHILS # BLD AUTO: 0 K/UL — SIGNIFICANT CHANGE UP (ref 0–0.2)
BASOPHILS NFR BLD AUTO: 0 % — SIGNIFICANT CHANGE UP (ref 0–2)
BUN SERPL-MCNC: 13 MG/DL — SIGNIFICANT CHANGE UP (ref 7–23)
CALCIUM SERPL-MCNC: 8.7 MG/DL — SIGNIFICANT CHANGE UP (ref 8.5–10.1)
CHLORIDE SERPL-SCNC: 109 MMOL/L — HIGH (ref 96–108)
CO2 SERPL-SCNC: 27 MMOL/L — SIGNIFICANT CHANGE UP (ref 22–31)
CREAT SERPL-MCNC: 0.67 MG/DL — SIGNIFICANT CHANGE UP (ref 0.5–1.3)
CULTURE RESULTS: NO GROWTH — SIGNIFICANT CHANGE UP
EGFR: 92 ML/MIN/1.73M2 — SIGNIFICANT CHANGE UP
EOSINOPHIL # BLD AUTO: 0 K/UL — SIGNIFICANT CHANGE UP (ref 0–0.5)
EOSINOPHIL NFR BLD AUTO: 0 % — SIGNIFICANT CHANGE UP (ref 0–6)
GLUCOSE BLDC GLUCOMTR-MCNC: 124 MG/DL — HIGH (ref 70–99)
GLUCOSE BLDC GLUCOMTR-MCNC: 210 MG/DL — HIGH (ref 70–99)
GLUCOSE BLDC GLUCOMTR-MCNC: 247 MG/DL — HIGH (ref 70–99)
GLUCOSE BLDC GLUCOMTR-MCNC: 341 MG/DL — HIGH (ref 70–99)
GLUCOSE SERPL-MCNC: 206 MG/DL — HIGH (ref 70–99)
GRAM STN FLD: SIGNIFICANT CHANGE UP
HCT VFR BLD CALC: 39.4 % — SIGNIFICANT CHANGE UP (ref 34.5–45)
HGB BLD-MCNC: 12.6 G/DL — SIGNIFICANT CHANGE UP (ref 11.5–15.5)
INR BLD: 2.23 RATIO — HIGH (ref 0.88–1.16)
LYMPHOCYTES # BLD AUTO: 0.55 K/UL — LOW (ref 1–3.3)
LYMPHOCYTES # BLD AUTO: 4 % — LOW (ref 13–44)
MCHC RBC-ENTMCNC: 31.8 PG — SIGNIFICANT CHANGE UP (ref 27–34)
MCHC RBC-ENTMCNC: 32 GM/DL — SIGNIFICANT CHANGE UP (ref 32–36)
MCV RBC AUTO: 99.5 FL — SIGNIFICANT CHANGE UP (ref 80–100)
METHOD TYPE: SIGNIFICANT CHANGE UP
MONOCYTES # BLD AUTO: 0.96 K/UL — HIGH (ref 0–0.9)
MONOCYTES NFR BLD AUTO: 7 % — SIGNIFICANT CHANGE UP (ref 2–14)
NEUTROPHILS # BLD AUTO: 12.25 K/UL — HIGH (ref 1.8–7.4)
NEUTROPHILS NFR BLD AUTO: 89 % — HIGH (ref 43–77)
NRBC # BLD: SIGNIFICANT CHANGE UP /100 WBCS (ref 0–0)
PLATELET # BLD AUTO: 191 K/UL — SIGNIFICANT CHANGE UP (ref 150–400)
POTASSIUM SERPL-MCNC: 3.7 MMOL/L — SIGNIFICANT CHANGE UP (ref 3.5–5.3)
POTASSIUM SERPL-SCNC: 3.7 MMOL/L — SIGNIFICANT CHANGE UP (ref 3.5–5.3)
PROTHROM AB SERPL-ACNC: 26.1 SEC — HIGH (ref 10.5–13.4)
RBC # BLD: 3.96 M/UL — SIGNIFICANT CHANGE UP (ref 3.8–5.2)
RBC # FLD: 13.4 % — SIGNIFICANT CHANGE UP (ref 10.3–14.5)
SODIUM SERPL-SCNC: 141 MMOL/L — SIGNIFICANT CHANGE UP (ref 135–145)
SPECIMEN SOURCE: SIGNIFICANT CHANGE UP
VANCOMYCIN TROUGH SERPL-MCNC: 13.5 UG/ML — SIGNIFICANT CHANGE UP (ref 10–20)
WBC # BLD: 13.76 K/UL — HIGH (ref 3.8–10.5)
WBC # FLD AUTO: 13.76 K/UL — HIGH (ref 3.8–10.5)

## 2022-10-14 PROCEDURE — 99233 SBSQ HOSP IP/OBS HIGH 50: CPT

## 2022-10-14 RX ORDER — ZOLPIDEM TARTRATE 10 MG/1
5 TABLET ORAL AT BEDTIME
Refills: 0 | Status: DISCONTINUED | OUTPATIENT
Start: 2022-10-14 | End: 2022-10-14

## 2022-10-14 RX ORDER — METOPROLOL TARTRATE 50 MG
150 TABLET ORAL DAILY
Refills: 0 | Status: DISCONTINUED | OUTPATIENT
Start: 2022-10-14 | End: 2022-10-14

## 2022-10-14 RX ORDER — OXYCODONE HYDROCHLORIDE 5 MG/1
5 TABLET ORAL EVERY 6 HOURS
Refills: 0 | Status: DISCONTINUED | OUTPATIENT
Start: 2022-10-14 | End: 2022-10-21

## 2022-10-14 RX ORDER — METOPROLOL TARTRATE 50 MG
25 TABLET ORAL EVERY 8 HOURS
Refills: 0 | Status: DISCONTINUED | OUTPATIENT
Start: 2022-10-14 | End: 2022-10-27

## 2022-10-14 RX ORDER — CHOLESTYRAMINE 4 G/9G
4 POWDER, FOR SUSPENSION ORAL
Refills: 0 | Status: DISCONTINUED | OUTPATIENT
Start: 2022-10-14 | End: 2022-10-27

## 2022-10-14 RX ORDER — WARFARIN SODIUM 2.5 MG/1
4 TABLET ORAL ONCE
Refills: 0 | Status: COMPLETED | OUTPATIENT
Start: 2022-10-14 | End: 2022-10-14

## 2022-10-14 RX ADMIN — HYDROMORPHONE HYDROCHLORIDE 0.5 MILLIGRAM(S): 2 INJECTION INTRAMUSCULAR; INTRAVENOUS; SUBCUTANEOUS at 23:15

## 2022-10-14 RX ADMIN — HYDROMORPHONE HYDROCHLORIDE 0.5 MILLIGRAM(S): 2 INJECTION INTRAMUSCULAR; INTRAVENOUS; SUBCUTANEOUS at 12:20

## 2022-10-14 RX ADMIN — OXYCODONE HYDROCHLORIDE 5 MILLIGRAM(S): 5 TABLET ORAL at 21:22

## 2022-10-14 RX ADMIN — WARFARIN SODIUM 4 MILLIGRAM(S): 2.5 TABLET ORAL at 20:54

## 2022-10-14 RX ADMIN — Medication 300 MILLIGRAM(S): at 20:52

## 2022-10-14 RX ADMIN — SIMVASTATIN 10 MILLIGRAM(S): 20 TABLET, FILM COATED ORAL at 20:52

## 2022-10-14 RX ADMIN — BUDESONIDE AND FORMOTEROL FUMARATE DIHYDRATE 2 PUFF(S): 160; 4.5 AEROSOL RESPIRATORY (INHALATION) at 21:23

## 2022-10-14 RX ADMIN — Medication 100 MILLIGRAM(S): at 05:20

## 2022-10-14 RX ADMIN — HYDROMORPHONE HYDROCHLORIDE 0.5 MILLIGRAM(S): 2 INJECTION INTRAMUSCULAR; INTRAVENOUS; SUBCUTANEOUS at 22:40

## 2022-10-14 RX ADMIN — GABAPENTIN 800 MILLIGRAM(S): 400 CAPSULE ORAL at 05:20

## 2022-10-14 RX ADMIN — Medication 300 MILLIGRAM(S): at 09:14

## 2022-10-14 RX ADMIN — Medication 25 MILLIGRAM(S): at 19:13

## 2022-10-14 RX ADMIN — Medication 4: at 16:39

## 2022-10-14 RX ADMIN — HYDROMORPHONE HYDROCHLORIDE 0.5 MILLIGRAM(S): 2 INJECTION INTRAMUSCULAR; INTRAVENOUS; SUBCUTANEOUS at 16:36

## 2022-10-14 RX ADMIN — BUDESONIDE AND FORMOTEROL FUMARATE DIHYDRATE 2 PUFF(S): 160; 4.5 AEROSOL RESPIRATORY (INHALATION) at 10:22

## 2022-10-14 RX ADMIN — Medication 10 MILLIGRAM(S): at 10:12

## 2022-10-14 RX ADMIN — HYDROMORPHONE HYDROCHLORIDE 0.5 MILLIGRAM(S): 2 INJECTION INTRAMUSCULAR; INTRAVENOUS; SUBCUTANEOUS at 17:00

## 2022-10-14 RX ADMIN — Medication 8: at 12:06

## 2022-10-14 RX ADMIN — CEFEPIME 100 MILLIGRAM(S): 1 INJECTION, POWDER, FOR SOLUTION INTRAMUSCULAR; INTRAVENOUS at 20:52

## 2022-10-14 RX ADMIN — Medication 4: at 21:36

## 2022-10-14 RX ADMIN — HYDROMORPHONE HYDROCHLORIDE 0.5 MILLIGRAM(S): 2 INJECTION INTRAMUSCULAR; INTRAVENOUS; SUBCUTANEOUS at 11:59

## 2022-10-14 RX ADMIN — GABAPENTIN 800 MILLIGRAM(S): 400 CAPSULE ORAL at 20:54

## 2022-10-14 RX ADMIN — GABAPENTIN 800 MILLIGRAM(S): 400 CAPSULE ORAL at 14:37

## 2022-10-14 RX ADMIN — OXYCODONE HYDROCHLORIDE 5 MILLIGRAM(S): 5 TABLET ORAL at 20:53

## 2022-10-14 RX ADMIN — CHOLESTYRAMINE 4 GRAM(S): 4 POWDER, FOR SUSPENSION ORAL at 20:52

## 2022-10-14 RX ADMIN — CEFEPIME 100 MILLIGRAM(S): 1 INJECTION, POWDER, FOR SOLUTION INTRAMUSCULAR; INTRAVENOUS at 09:14

## 2022-10-14 NOTE — PROGRESS NOTE ADULT - ASSESSMENT
71yo F presents with b/l lower extremity cellulitis. Sepsis called  Febrile, leukocytosis with lactic acidosis  No DVT on venous duplex  CT demonstrated diffuse stranding and skin thickening of both left and right extremities likely cellulitis and edema  Blood cultures+    Plan:  - broad spectrum IV ABx  - judicious fluid resuscitation  - trend lactic acid  - continue local wound care   - continue ICU care for sepsis    Plan discussed with Dr. Yoo

## 2022-10-14 NOTE — PROGRESS NOTE ADULT - ASSESSMENT
73 y/o female h/o obesity, Afib on coumadin, HFpEF, DM2, peripheral neuropathy, PVD, HTN, HLD, chronic b/l legs venous insufficiency, UC was admitted on 10/12 for fever and right lower extremity redness, swelling and pain. Patient called her vascular surgeons ( Dr. Yoo) office, who instructed her to come to the ER. Patient states the pain in her RLE worsened over the past 2 days PTA. She admits to increased fatigue and fever. She denies any SOB, CP, abdominal pain, N/V. In ER she was noted hypotensive and met sepsis criteria, given 30cc/kg, started on phenylephrine and received clindamycin, vancomycin IV and cefepime.     1. Febrile syndrome. Sepsis with GNR. Extensive RLE cellulitis. B/L LE chronic stasis dermatitis. Obesity. Allergy to PCN.  -concern about toxic shock syndrome raised  -leukocytosis   -BC x 2, urine c/s noted  -on vancomycin 1500 mg IV q12h, cefepime 2 gm IV q12h and clindamycin 900 mg IV q8h # 2  -tolerating abx well so far; no side effects noted  -vancomycin trough level is therapeutic  -d/c clindamycin  -continue abx coverage with vancomycin and cefepime  -f/u cultures  -leukocytosis   -elevate legs  -pain management  -elevate legs  -monitor temps  -f/u CBC  -supportive care  2. Other issues:   -care per medicine    d/w Dr. Delatorre

## 2022-10-14 NOTE — PROGRESS NOTE ADULT - SUBJECTIVE AND OBJECTIVE BOX
Patient is a 72y old  Female who presents with a chief complaint of Sepsis (14 Oct 2022 10:54)      BRIEF HOSPITAL COURSE: 71 y/o female with pmhx of afib on coumadin, HFpEF, peripheral neuroapthy, DM II, PVD, HTN, HLD admitted on  for RLE pain admitted with septic shock due to RLE cellulitis       Events last 24 hours: blood clutures with pseudomonas putida. off pressors. c/o pain    PAST MEDICAL & SURGICAL HISTORY:  Diabetes mellitus type II, controlled      Atrial fibrillation      Congestive heart failure      Dyspnea      Morbid obesity with BMI of 40.0-44.9, adult      Neuropathy      Peripheral venous insufficiency      Thyroid nodule      HTN (hypertension)      Cellulitis      History of esophagogastroduodenoscopy (EGD)      H/O colonoscopy      Other complications of gastric band procedure      S/P left knee arthroscopy      Status post medial meniscus repair          Review of Systems:  CONSTITUTIONAL: No fever, chills, or fatigue  EYES: No eye pain, visual disturbances, or discharge  ENMT:  No difficulty hearing, tinnitus, vertigo; No sinus or throat pain  NECK: No pain or stiffness  RESPIRATORY: No cough, wheezing, chills or hemoptysis; No shortness of breath  CARDIOVASCULAR: No chest pain, palpitations, dizziness, or leg swelling  GASTROINTESTINAL: No abdominal or epigastric pain. No nausea, vomiting, or hematemesis; No diarrhea or constipation. No melena or hematochezia.  GENITOURINARY: No dysuria, frequency, hematuria, or incontinence  NEUROLOGICAL: No headaches, memory loss, loss of strength, numbness, or tremors  SKIN: + cellulitis  MUSCULOSKELETAL: No joint pain or swelling; No muscle, back, or extremity pain  PSYCHIATRIC: No depression, anxiety, mood swings, or difficulty sleeping      Medications:  cefepime   IVPB 2000 milliGRAM(s) IV Intermittent every 12 hours  vancomycin  IVPB 1500 milliGRAM(s) IV Intermittent every 12 hours    metoprolol tartrate 25 milliGRAM(s) Oral every 8 hours    ALBUTerol    90 MICROgram(s) HFA Inhaler 2 Puff(s) Inhalation every 6 hours PRN  budesonide 160 MICROgram(s)/formoterol 4.5 MICROgram(s) Inhaler 2 Puff(s) Inhalation two times a day    acetaminophen     Tablet .. 650 milliGRAM(s) Oral every 6 hours PRN  gabapentin 800 milliGRAM(s) Oral three times a day  HYDROmorphone  Injectable 0.5 milliGRAM(s) IV Push every 4 hours PRN  oxyCODONE    IR 5 milliGRAM(s) Oral every 6 hours PRN  zolpidem 5 milliGRAM(s) Oral at bedtime PRN            cholestyramine Powder (Sugar-Free) 4 Gram(s) Oral two times a day  dextrose 50% Injectable 25 Gram(s) IV Push once  dextrose 50% Injectable 12.5 Gram(s) IV Push once  dextrose 50% Injectable 25 Gram(s) IV Push once  dextrose Oral Gel 15 Gram(s) Oral once PRN  glucagon  Injectable 1 milliGRAM(s) IntraMuscular once  insulin lispro (ADMELOG) corrective regimen sliding scale   SubCutaneous Before meals and at bedtime  predniSONE   Tablet 10 milliGRAM(s) Oral daily  simvastatin 10 milliGRAM(s) Oral at bedtime    dextrose 5%. 1000 milliLiter(s) IV Continuous <Continuous>  dextrose 5%. 1000 milliLiter(s) IV Continuous <Continuous>                ICU Vital Signs Last 24 Hrs  T(C): 36.7 (14 Oct 2022 16:00), Max: 37 (14 Oct 2022 05:00)  T(F): 98.1 (14 Oct 2022 16:00), Max: 98.6 (14 Oct 2022 05:00)  HR: 115 (14 Oct 2022 21:00) (93 - 119)  BP: 121/50 (14 Oct 2022 20:00) (105/49 - 144/74)  BP(mean): 60 (14 Oct 2022 20:00) (60 - 111)  ABP: --  ABP(mean): --  RR: 23 (14 Oct 2022 21:00) (12 - 29)  SpO2: 99% (14 Oct 2022 21:00) (87% - 99%)    O2 Parameters below as of 14 Oct 2022 08:00  Patient On (Oxygen Delivery Method): room air                I&O's Detail    13 Oct 2022 07:01  -  14 Oct 2022 07:00  --------------------------------------------------------  IN:    IV PiggyBack: 1256 mL    Lactated Ringers: 1030 mL    Oral Fluid: 700 mL    Phenylephrine: 137 mL  Total IN: 3123 mL    OUT:    Incontinent per Collection Bag (mL): 2150 mL  Total OUT: 2150 mL    Total NET: 973 mL      14 Oct 2022 07:01  -  14 Oct 2022 21:47  --------------------------------------------------------  IN:    IV PiggyBack: 550 mL    Oral Fluid: 680 mL  Total IN: 1230 mL    OUT:    Incontinent per Collection Bag (mL): 850 mL  Total OUT: 850 mL    Total NET: 380 mL            LABS:                        12.6   13.76 )-----------( 191      ( 14 Oct 2022 05:29 )             39.4     10-14    141  |  109<H>  |  13  ----------------------------<  206<H>  3.7   |  27  |  0.67    Ca    8.7      14 Oct 2022 05:29  Phos  3.2     10-13  Mg     2.4     10-13    TPro  6.3  /  Alb  2.7<L>  /  TBili  1.2  /  DBili  x   /  AST  19  /  ALT  27  /  AlkPhos  46  10-13          CAPILLARY BLOOD GLUCOSE      POCT Blood Glucose.: 247 mg/dL (14 Oct 2022 21:25)    PT/INR - ( 14 Oct 2022 05:29 )   PT: 26.1 sec;   INR: 2.23 ratio         PTT - ( 14 Oct 2022 05:29 )  PTT:39.8 sec  Urinalysis Basic - ( 13 Oct 2022 02:45 )    Color: Yellow / Appearance: Clear / S.005 / pH: x  Gluc: x / Ketone: Negative  / Bili: Negative / Urobili: Negative   Blood: x / Protein: Negative / Nitrite: Negative   Leuk Esterase: Negative / RBC: 3-5 /HPF / WBC 0-2   Sq Epi: x / Non Sq Epi: Occasional / Bacteria: Negative      CULTURES:  Culture Results:   No growth (10-13 @ 02:46)  Culture Results:   Growth in aerobic bottle: Pseudomonas putida group  ***Blood Panel PCR results on this specimen are available  approximately 3 hours after the Gram stain result.***  Gram stain, PCR, and/or culture results may not always  correspond due to difference in methodologies.  ************************************************************  This PCR assay was performed by multiplex PCR. This  Assay tests for 66 bacterial and resistance gene targets.  Please refer to the [facility] test directory  at [email or web address] for details. (10-12 @ 20:53)  Rapid RVP Result: NotDetec (10-12 @ 20:26)  Culture Results:   Growth in aerobic bottle: Pseudomonas putida group  See previous culture [id number]  ***Blood Panel PCR results on this specimen are available  approximately 3 hours after the Gram stain result.***  Gram stain, PCR, and/or culture results may not always  correspond due to difference in methodologies.  ************************************************************  This PCR assay was performed by multiplex PCR. This  Assay tests for 66 bacterial and resistance gene targets.  Please refer to the [facility] test directory  at [email or web address] for details. (10-12 @ 20:26)      Physical Examination:    General: No acute distress.      HEENT: Pupils equal, reactive to light.  Symmetric.    PULM: Clear to auscultation bilaterally, no significant sputum production    NECK: Supple, no lymphadenopathy, trachea midline    CVS: Regular rate and rhythm, no murmurs, rubs, or gallops    ABD: Soft, nondistended, nontender, normoactive bowel sounds, no masses    EXT: RLE cellulitis with discharge      NEURO: Alert, oriented, interactive, nonfocal    RADIOLOGY: reviewed

## 2022-10-14 NOTE — PROGRESS NOTE ADULT - SUBJECTIVE AND OBJECTIVE BOX
Patient was seen and evaluated at bedside this morning. No acute events overnight. Still complains of lower extremity pain. VS reviewed.    Vital Signs Last 24 Hrs  T(C): 36.6 (14 Oct 2022 08:00), Max: 37.4 (13 Oct 2022 16:00)  T(F): 97.8 (14 Oct 2022 08:00), Max: 99.4 (13 Oct 2022 16:00)  HR: 103 (14 Oct 2022 08:00) (88 - 121)  BP: 140/85 (14 Oct 2022 07:00) (94/51 - 144/74)  BP(mean): 91 (14 Oct 2022 07:00) (60 - 111)  RR: 21 (14 Oct 2022 08:00) (12 - 29)  SpO2: 94% (14 Oct 2022 08:00) (91% - 96%)    Parameters below as of 14 Oct 2022 08:00  Patient On (Oxygen Delivery Method): room air    PHYSICAL EXAM:  GENERAL: NAD, AOx3, well developed  HEAD:  Atraumatic, Normocephalic  EYES: EOMI, conjunctiva and sclera clear  NECK: Supple, No JVD  CHEST/LUNG: Unlabored respirations b/l  HEART: S1 and S2 normal  ABDOMEN: soft, nondistended, nontender  EXTREMITIES:  dopplerable R. DP/PT. RLE with discoloration and swelling with medial shin ulcer. Profound edema up to knee. motor and sensation intact. tenderness to palpation  NERVOUS SYSTEM:  AO X3, speech clear. No deficits   MSK: full ROM, no deformities  SKIN: warm to touch. No rashes or lesions     I&O's Summary    13 Oct 2022 07:  -  14 Oct 2022 07:00  --------------------------------------------------------  IN: 3123 mL / OUT: 2150 mL / NET: 973 mL      I&O's Detail    13 Oct 2022 07:01  -  14 Oct 2022 07:00  --------------------------------------------------------  IN:    IV PiggyBack: 1256 mL    Lactated Ringers: 1030 mL    Oral Fluid: 700 mL    Phenylephrine: 137 mL  Total IN: 3123 mL    OUT:    Incontinent per Collection Bag (mL): 2150 mL  Total OUT: 2150 mL    Total NET: 973 mL          MEDICATIONS  (STANDING):  budesonide 160 MICROgram(s)/formoterol 4.5 MICROgram(s) Inhaler 2 Puff(s) Inhalation two times a day  cefepime   IVPB 2000 milliGRAM(s) IV Intermittent every 12 hours  cholestyramine Powder (Sugar-Free) 4 Gram(s) Oral two times a day  clindamycin IVPB 900 milliGRAM(s) IV Intermittent every 8 hours  dextrose 5%. 1000 milliLiter(s) (50 mL/Hr) IV Continuous <Continuous>  dextrose 5%. 1000 milliLiter(s) (100 mL/Hr) IV Continuous <Continuous>  dextrose 50% Injectable 25 Gram(s) IV Push once  dextrose 50% Injectable 12.5 Gram(s) IV Push once  dextrose 50% Injectable 25 Gram(s) IV Push once  gabapentin 800 milliGRAM(s) Oral three times a day  glucagon  Injectable 1 milliGRAM(s) IntraMuscular once  insulin lispro (ADMELOG) corrective regimen sliding scale   SubCutaneous Before meals and at bedtime  predniSONE   Tablet 10 milliGRAM(s) Oral daily  simvastatin 10 milliGRAM(s) Oral at bedtime  vancomycin  IVPB 1500 milliGRAM(s) IV Intermittent every 12 hours  warfarin 4 milliGRAM(s) Oral once    MEDICATIONS  (PRN):  acetaminophen     Tablet .. 650 milliGRAM(s) Oral every 6 hours PRN Temp greater or equal to 38C (100.4F)  ALBUTerol    90 MICROgram(s) HFA Inhaler 2 Puff(s) Inhalation every 6 hours PRN Shortness of Breath and/or Wheezing  dextrose Oral Gel 15 Gram(s) Oral once PRN Blood Glucose LESS THAN 70 milliGRAM(s)/deciliter  HYDROmorphone  Injectable 0.5 milliGRAM(s) IV Push every 4 hours PRN Moderate Pain (4 - 6)      LABS:                        12.6   13.76 )-----------( 191      ( 14 Oct 2022 05:29 )             39.4     10-14    141  |  109<H>  |  13  ----------------------------<  206<H>  3.7   |  27  |  0.67    Ca    8.7      14 Oct 2022 05:29  Phos  3.2     10-13  Mg     2.4     10-13    TPro  6.3  /  Alb  2.7<L>  /  TBili  1.2  /  DBili  x   /  AST  19  /  ALT  27  /  AlkPhos  46  10-13    PT/INR - ( 14 Oct 2022 05:29 )   PT: 26.1 sec;   INR: 2.23 ratio         PTT - ( 14 Oct 2022 05:29 )  PTT:39.8 sec  Urinalysis Basic - ( 13 Oct 2022 02:45 )    Color: Yellow / Appearance: Clear / S.005 / pH: x  Gluc: x / Ketone: Negative  / Bili: Negative / Urobili: Negative   Blood: x / Protein: Negative / Nitrite: Negative   Leuk Esterase: Negative / RBC: 3-5 /HPF / WBC 0-2   Sq Epi: x / Non Sq Epi: Occasional / Bacteria: Negative        RADIOLOGY & ADDITIONAL STUDIES:

## 2022-10-14 NOTE — PROGRESS NOTE ADULT - SUBJECTIVE AND OBJECTIVE BOX
HPI:  71 y/o female pmhx Afib on coumadin, HFpEF, DM2, peripheral neuropathy, PVD, HTN, HLD, obesity presented to ER w/ fevers and right lower extremity pain. Patient called her vascular surgeons ( Dr. Yoo)  office today, who instructed her to come to the ER. Patient states the pain in her RLE worsened over the past 2 days PTA. Today she admits to increased fatigue and fevers.    10/14: She is off pressors and had GNR growing in the blood       PAST MEDICAL & SURGICAL HISTORY:  Diabetes mellitus type II, controlled      Atrial fibrillation      Congestive heart failure      Dyspnea      Morbid obesity with BMI of 40.0-44.9, adult      Neuropathy      Peripheral venous insufficiency      Thyroid nodule      HTN (hypertension)      Cellulitis      History of esophagogastroduodenoscopy (EGD)      H/O colonoscopy      Other complications of gastric band procedure      S/P left knee arthroscopy      Status post medial meniscus repair          FAMILY HISTORY:  Family history of breast cancer in mother    Family history of diabetes mellitus in mother        Social Hx:    Allergies    penicillin (Hives)    Intolerances            ICU Vital Signs Last 24 Hrs  T(C): 36.6 (14 Oct 2022 08:00), Max: 37.4 (13 Oct 2022 16:00)  T(F): 97.8 (14 Oct 2022 08:00), Max: 99.4 (13 Oct 2022 16:00)  HR: 119 (14 Oct 2022 10:23) (88 - 121)  BP: 111/83 (14 Oct 2022 08:00) (94/51 - 144/74)  BP(mean): 86 (14 Oct 2022 08:00) (60 - 111)  ABP: --  ABP(mean): --  RR: 21 (14 Oct 2022 08:00) (12 - 29)  SpO2: 94% (14 Oct 2022 08:00) (91% - 96%)    O2 Parameters below as of 14 Oct 2022 08:00  Patient On (Oxygen Delivery Method): room air                I&O's Summary    13 Oct 2022 07:01  -  14 Oct 2022 07:00  --------------------------------------------------------  IN: 3123 mL / OUT: 2150 mL / NET: 973 mL                              12.6   13.76 )-----------( 191      ( 14 Oct 2022 05:29 )             39.4       10-14    141  |  109<H>  |  13  ----------------------------<  206<H>  3.7   |  27  |  0.67    Ca    8.7      14 Oct 2022 05:29  Phos  3.2     10-13  Mg     2.4     10-    TPro  6.3  /  Alb  2.7<L>  /  TBili  1.2  /  DBili  x   /  AST  19  /  ALT  27  /  AlkPhos  46  10-13                Urinalysis Basic - ( 13 Oct 2022 02:45 )    Color: Yellow / Appearance: Clear / S.005 / pH: x  Gluc: x / Ketone: Negative  / Bili: Negative / Urobili: Negative   Blood: x / Protein: Negative / Nitrite: Negative   Leuk Esterase: Negative / RBC: 3-5 /HPF / WBC 0-2   Sq Epi: x / Non Sq Epi: Occasional / Bacteria: Negative        MEDICATIONS  (STANDING):  budesonide 160 MICROgram(s)/formoterol 4.5 MICROgram(s) Inhaler 2 Puff(s) Inhalation two times a day  cefepime   IVPB 2000 milliGRAM(s) IV Intermittent every 12 hours  cholestyramine Powder (Sugar-Free) 4 Gram(s) Oral two times a day  dextrose 5%. 1000 milliLiter(s) (50 mL/Hr) IV Continuous <Continuous>  dextrose 5%. 1000 milliLiter(s) (100 mL/Hr) IV Continuous <Continuous>  dextrose 50% Injectable 25 Gram(s) IV Push once  dextrose 50% Injectable 12.5 Gram(s) IV Push once  dextrose 50% Injectable 25 Gram(s) IV Push once  gabapentin 800 milliGRAM(s) Oral three times a day  glucagon  Injectable 1 milliGRAM(s) IntraMuscular once  insulin lispro (ADMELOG) corrective regimen sliding scale   SubCutaneous Before meals and at bedtime  predniSONE   Tablet 10 milliGRAM(s) Oral daily  simvastatin 10 milliGRAM(s) Oral at bedtime  vancomycin  IVPB 1500 milliGRAM(s) IV Intermittent every 12 hours  warfarin 4 milliGRAM(s) Oral once    MEDICATIONS  (PRN):  acetaminophen     Tablet .. 650 milliGRAM(s) Oral every 6 hours PRN Temp greater or equal to 38C (100.4F)  ALBUTerol    90 MICROgram(s) HFA Inhaler 2 Puff(s) Inhalation every 6 hours PRN Shortness of Breath and/or Wheezing  dextrose Oral Gel 15 Gram(s) Oral once PRN Blood Glucose LESS THAN 70 milliGRAM(s)/deciliter  HYDROmorphone  Injectable 0.5 milliGRAM(s) IV Push every 4 hours PRN Moderate Pain (4 - 6)      DVT Prophylaxis: Coumadin    Advanced Directives:  Discussed with:    Visit Information:    ** Time is exclusive of billed procedures and/or teaching and/or routine family updates.

## 2022-10-14 NOTE — PROVIDER CONTACT NOTE (CRITICAL VALUE NOTIFICATION) - NAME OF MD/NP/PA/DO NOTIFIED:
[facility]  Outpatient Pulmonary Rehabilitation Flowsheet  [id number] [facility], [address] [place] [address]  1943       Patient's carry-over of treatment delivered by: performs PLB independently. Objective Daily Findings      Respiratory Muscle Functioning/Exercise Conditioning/Strengthening Session:    Time In: 12:30  Time Out::1:30  Session Number: 31  O2 with Theapy: 0 L/min    Pre SPO2 96  Pre HR:85  Pre BP: 110/71    RR: 18 Wt: not tested by patient  Temp: not tested by patient   Post SPO2: 96 Post HR: 78  Post BP: 118/78    Self Care Home Management Instruction/Education    Self Care Home Management Instruction Education: Breathing Retaining and Exercise Concepts. Patient's Response to Education: Patient actively participated in education. Comments: Individual Therapy               Goal     Actual                      During Therapy                 Post-Therapy     % SpO2 HR RPD 1 - 4 RPE 6-20 Pain  1 - 10 % SpO2 HR RPD 1 - 4 RPE 6-20 Pain 0 - 10   Stretching/DB  /Monitoring               IS 25 min x 2000 20 min x 1500 97 68 1 8 1 97 72 1 8 1   IMT               Arm bike 30 min x 25 coy 30 min x 30 coy 96 69 2 11 3 96 70 2 12 3   Weighted DB  15 min x 4 lb 10 min x 7 lb 97 72 1 8 1 97 66 1 8 1   Arm Weights                 Patient's Response to Therapy: Good effort and Good motivation. C/o pain in shoulders on arm bike.   Comments:    Time In: 1:30     Session Number:32  Time Out: 2:05     O2 with therapy: 0 L/min         Individual Therapy               Goal     Actual                      During Therapy           Post Therapy     % SpO2 HR RPD 1 - 4 RPE 6-20 Pain  1 - 10 % SpO2 HR RPD 1 - 4 RPE 6-20 Pain 0 - 10   Stretching/DB  /Monitoring 5 min 5 min 97 82 1 7 1 97 63 1 8 1   Stat Bike               Stepper 30 min x L5 30 min x L5 97 84 2 12 4 96 88 2 13 4   Treadmill               Rest/PLB                    Patient's response to therapy: Good
effort. C/o pain in legs on stepper. Assessment    Patient's response: Patient progressing towardsd LTGs evident by: Increased Pacing. Plan: Continue as written    Code     Billin units      Physician supervision provided this date of service by: Dr. Medrano Divers     Therapist Signature:  GIANCARLO Young    Therapist PRINTED Name and Credential: GIANCARLO Young    2017  4:50 PM
Gonzalo GORDON
MD Bianchi

## 2022-10-14 NOTE — PROGRESS NOTE ADULT - SUBJECTIVE AND OBJECTIVE BOX
Date of service: 10-14-22 @ 10:18    OOB to chair  Hypotension is improving  Has low grade fever  Has left leg pain - burning sensation     ROS: denies dizziness, no HA, no SOB or cough, no abdominal pain, no diarrhea or constipation; no dysuria    MEDICATIONS  (STANDING):  budesonide 160 MICROgram(s)/formoterol 4.5 MICROgram(s) Inhaler 2 Puff(s) Inhalation two times a day  cefepime   IVPB 2000 milliGRAM(s) IV Intermittent every 12 hours  cholestyramine Powder (Sugar-Free) 4 Gram(s) Oral two times a day  dextrose 5%. 1000 milliLiter(s) (50 mL/Hr) IV Continuous <Continuous>  dextrose 5%. 1000 milliLiter(s) (100 mL/Hr) IV Continuous <Continuous>  dextrose 50% Injectable 25 Gram(s) IV Push once  dextrose 50% Injectable 12.5 Gram(s) IV Push once  dextrose 50% Injectable 25 Gram(s) IV Push once  gabapentin 800 milliGRAM(s) Oral three times a day  glucagon  Injectable 1 milliGRAM(s) IntraMuscular once  insulin lispro (ADMELOG) corrective regimen sliding scale   SubCutaneous Before meals and at bedtime  predniSONE   Tablet 10 milliGRAM(s) Oral daily  simvastatin 10 milliGRAM(s) Oral at bedtime  vancomycin  IVPB 1500 milliGRAM(s) IV Intermittent every 12 hours  warfarin 4 milliGRAM(s) Oral once    Vital Signs Last 24 Hrs  T(C): 36.6 (14 Oct 2022 08:00), Max: 37.4 (13 Oct 2022 16:00)  T(F): 97.8 (14 Oct 2022 08:00), Max: 99.4 (13 Oct 2022 16:00)  HR: 103 (14 Oct 2022 08:00) (88 - 121)  BP: 140/85 (14 Oct 2022 07:00) (94/51 - 144/74)  BP(mean): 91 (14 Oct 2022 07:00) (60 - 111)  RR: 21 (14 Oct 2022 08:00) (12 - 29)  SpO2: 94% (14 Oct 2022 08:00) (91% - 96%)    Parameters below as of 14 Oct 2022 08:00  Patient On (Oxygen Delivery Method): room air     Physical exam:    Constitutional:  No acute distress  HEENT: NC/AT, EOMI, PERRLA, conjunctivae clear; ears and nose atraumatic  Neck: supple; thyroid not palpable  Back: no tenderness  Respiratory: respiratory effort normal; clear to auscultation  Cardiovascular: S1S2 regular, no murmurs  Abdomen: soft, not tender, not distended, positive BS  Genitourinary: no suprapubic tenderness  Lymphatic: no LN palpable  Musculoskeletal: no muscle tenderness, no joint swelling or tenderness  Extremities: no pedal edema  Right foot, ankle, shin, calf extensive erythema, edema, warmth; thin skin; 2 medial aspect necrotic ulcer; scant serous discharge around ankle  Left lower leg thickened skin with chronic discoloration  Neurological/ Psychiatric: AxOx3, judgement and insight normal; moving all extremities  Skin: no rashes; no palpable lesions    Labs: reviewed                        . )-----------( 191      ( 14 Oct 2022 05:29 )             39.4     10-14    141  |  109<H>  |  13  ----------------------------<  206<H>  3.7   |  27  |  0.67    Ca    8.7      14 Oct 2022 05:29  Phos  3.2     10-13  Mg     2.4     10-13    TPro  6.3  /  Alb  2.7<L>  /  TBili  1.2  /  DBili  x   /  AST  19  /  ALT  27  /  AlkPhos  46  10-13    Vancomycin Level, Trough: 13.5 ug/mL (10-14 @ 08:45)                        13.7   15.58 )-----------( 251      ( 13 Oct 2022 05:44 )             42.0     10-13    139  |  107  |  13  ----------------------------<  100<H>  4.0   |  26  |  0.73    Ca    8.6      13 Oct 2022 05:44  Phos  3.2     10-13  Mg     2.4     10-13    TPro  6.3  /  Alb  2.7<L>  /  TBili  1.2  /  DBili  x   /  AST  19  /  ALT  27  /  AlkPhos  46  10-13     LIVER FUNCTIONS - ( 13 Oct 2022 05:44 )  Alb: 2.7 g/dL / Pro: 6.3 gm/dL / ALK PHOS: 46 U/L / ALT: 27 U/L / AST: 19 U/L / GGT: x           Urinalysis Basic - ( 13 Oct 2022 02:45 )    Color: Yellow / Appearance: Clear / S.005 / pH: x  Gluc: x / Ketone: Negative  / Bili: Negative / Urobili: Negative   Blood: x / Protein: Negative / Nitrite: Negative   Leuk Esterase: Negative / RBC: 3-5 /HPF / WBC 0-2   Sq Epi: x / Non Sq Epi: Occasional / Bacteria: Negative    (10-12 @ 20:26)  NotDetec      Culture - Blood (collected 12 Oct 2022 20:53)  Source: .Blood None  Gram Stain (14 Oct 2022 03:00):    Growth in aerobic bottle: Gram Negative Rods  Preliminary Report (14 Oct 2022 06:21):    Growth in aerobic bottle: Gram Negative Rods  Organism: Blood Culture PCR (14 Oct 2022 06:47)      -  Blood PCR Panel: NEG      Method Type: PCR    Culture - Blood (collected 12 Oct 2022 20:26)  Source: .Blood None  Gram Stain (13 Oct 2022 22:37):    Growth in aerobic bottle: Gram Negative Rods  Preliminary Report (13 Oct 2022 22:37):    Growth in aerobic bottle: Gram Negative Rods    Radiology: all available radiological tests reviewed    Advanced directives addressed: full resuscitation

## 2022-10-14 NOTE — PROGRESS NOTE ADULT - ASSESSMENT
A/P: 72 female with Cellulitis and severe sepsis from GNR.    AFIB  HTN  DM II    Plan:  Patient cellulitis improving  Continue Cefepime and Vanco per ID.  D/C Clinda  Continue RISS  PO Diet  Continuing home medications  Vascular following  Wound Care following  Continue Coumadin    Transfer to reg floor  Called into the Hospitalist at 11:03AM

## 2022-10-14 NOTE — PROGRESS NOTE ADULT - RESPIRATORY
clear to auscultation bilaterally/no wheezes/no rales/no rhonchi
clear to auscultation bilaterally/no wheezes/no respiratory distress/breath sounds equal

## 2022-10-14 NOTE — PROGRESS NOTE ADULT - ASSESSMENT
73 y/o female with pmhx of afib on coumadin, HFpEF, peripheral neuroapthy, DM II, PVD, HTN, HLD admitted on 11/12 for RLE pain admitted with:      1. septic shock  2. RLE cellulitis  3. GNR bacteremia (pseudomonas putida)  4. elevated INR    - rcvd 4 mg coumadin tonight. check inr in am  - cefepime and vanco. ID following  - wound care  - oxy prn. ambien prn  - stable off pressors  - rate controlled

## 2022-10-15 LAB
-  AMIKACIN: SIGNIFICANT CHANGE UP
-  AZTREONAM: SIGNIFICANT CHANGE UP
-  CEFEPIME: SIGNIFICANT CHANGE UP
-  CEFTRIAXONE: SIGNIFICANT CHANGE UP
-  CIPROFLOXACIN: SIGNIFICANT CHANGE UP
-  GENTAMICIN: SIGNIFICANT CHANGE UP
-  LEVOFLOXACIN: SIGNIFICANT CHANGE UP
-  MEROPENEM: SIGNIFICANT CHANGE UP
-  PIPERACILLIN/TAZOBACTAM: SIGNIFICANT CHANGE UP
-  TOBRAMYCIN: SIGNIFICANT CHANGE UP
-  TRIMETHOPRIM/SULFAMETHOXAZOLE: SIGNIFICANT CHANGE UP
ANION GAP SERPL CALC-SCNC: 5 MMOL/L — SIGNIFICANT CHANGE UP (ref 5–17)
APTT BLD: 33.4 SEC — SIGNIFICANT CHANGE UP (ref 27.5–35.5)
BUN SERPL-MCNC: 19 MG/DL — SIGNIFICANT CHANGE UP (ref 7–23)
CALCIUM SERPL-MCNC: 8.6 MG/DL — SIGNIFICANT CHANGE UP (ref 8.5–10.1)
CHLORIDE SERPL-SCNC: 107 MMOL/L — SIGNIFICANT CHANGE UP (ref 96–108)
CO2 SERPL-SCNC: 28 MMOL/L — SIGNIFICANT CHANGE UP (ref 22–31)
CREAT SERPL-MCNC: 0.58 MG/DL — SIGNIFICANT CHANGE UP (ref 0.5–1.3)
CULTURE RESULTS: SIGNIFICANT CHANGE UP
CULTURE RESULTS: SIGNIFICANT CHANGE UP
EGFR: 95 ML/MIN/1.73M2 — SIGNIFICANT CHANGE UP
GLUCOSE BLDC GLUCOMTR-MCNC: 142 MG/DL — HIGH (ref 70–99)
GLUCOSE BLDC GLUCOMTR-MCNC: 150 MG/DL — HIGH (ref 70–99)
GLUCOSE BLDC GLUCOMTR-MCNC: 153 MG/DL — HIGH (ref 70–99)
GLUCOSE BLDC GLUCOMTR-MCNC: 153 MG/DL — HIGH (ref 70–99)
GLUCOSE SERPL-MCNC: 178 MG/DL — HIGH (ref 70–99)
HCT VFR BLD CALC: 39.9 % — SIGNIFICANT CHANGE UP (ref 34.5–45)
HGB BLD-MCNC: 12.5 G/DL — SIGNIFICANT CHANGE UP (ref 11.5–15.5)
INR BLD: 1.56 RATIO — HIGH (ref 0.88–1.16)
MAGNESIUM SERPL-MCNC: 1.8 MG/DL — SIGNIFICANT CHANGE UP (ref 1.6–2.6)
MCHC RBC-ENTMCNC: 31.3 GM/DL — LOW (ref 32–36)
MCHC RBC-ENTMCNC: 31.3 PG — SIGNIFICANT CHANGE UP (ref 27–34)
MCV RBC AUTO: 100 FL — SIGNIFICANT CHANGE UP (ref 80–100)
METHOD TYPE: SIGNIFICANT CHANGE UP
ORGANISM # SPEC MICROSCOPIC CNT: SIGNIFICANT CHANGE UP
PHOSPHATE SERPL-MCNC: 3.1 MG/DL — SIGNIFICANT CHANGE UP (ref 2.5–4.5)
PLATELET # BLD AUTO: 225 K/UL — SIGNIFICANT CHANGE UP (ref 150–400)
POTASSIUM SERPL-MCNC: 3.7 MMOL/L — SIGNIFICANT CHANGE UP (ref 3.5–5.3)
POTASSIUM SERPL-SCNC: 3.7 MMOL/L — SIGNIFICANT CHANGE UP (ref 3.5–5.3)
PROTHROM AB SERPL-ACNC: 18.2 SEC — HIGH (ref 10.5–13.4)
RBC # BLD: 3.99 M/UL — SIGNIFICANT CHANGE UP (ref 3.8–5.2)
RBC # FLD: 13.2 % — SIGNIFICANT CHANGE UP (ref 10.3–14.5)
SODIUM SERPL-SCNC: 140 MMOL/L — SIGNIFICANT CHANGE UP (ref 135–145)
SPECIMEN SOURCE: SIGNIFICANT CHANGE UP
SPECIMEN SOURCE: SIGNIFICANT CHANGE UP
WBC # BLD: 10.99 K/UL — HIGH (ref 3.8–10.5)
WBC # FLD AUTO: 10.99 K/UL — HIGH (ref 3.8–10.5)

## 2022-10-15 PROCEDURE — 99232 SBSQ HOSP IP/OBS MODERATE 35: CPT | Mod: GC

## 2022-10-15 PROCEDURE — 99233 SBSQ HOSP IP/OBS HIGH 50: CPT

## 2022-10-15 RX ORDER — POTASSIUM CHLORIDE 20 MEQ
40 PACKET (EA) ORAL ONCE
Refills: 0 | Status: COMPLETED | OUTPATIENT
Start: 2022-10-15 | End: 2022-10-16

## 2022-10-15 RX ADMIN — HYDROMORPHONE HYDROCHLORIDE 0.5 MILLIGRAM(S): 2 INJECTION INTRAMUSCULAR; INTRAVENOUS; SUBCUTANEOUS at 16:30

## 2022-10-15 RX ADMIN — Medication 25 MILLIGRAM(S): at 13:04

## 2022-10-15 RX ADMIN — Medication 10 MILLIGRAM(S): at 09:41

## 2022-10-15 RX ADMIN — HYDROMORPHONE HYDROCHLORIDE 0.5 MILLIGRAM(S): 2 INJECTION INTRAMUSCULAR; INTRAVENOUS; SUBCUTANEOUS at 08:41

## 2022-10-15 RX ADMIN — GABAPENTIN 800 MILLIGRAM(S): 400 CAPSULE ORAL at 05:14

## 2022-10-15 RX ADMIN — HYDROMORPHONE HYDROCHLORIDE 0.5 MILLIGRAM(S): 2 INJECTION INTRAMUSCULAR; INTRAVENOUS; SUBCUTANEOUS at 09:00

## 2022-10-15 RX ADMIN — Medication 2: at 12:02

## 2022-10-15 RX ADMIN — BUDESONIDE AND FORMOTEROL FUMARATE DIHYDRATE 2 PUFF(S): 160; 4.5 AEROSOL RESPIRATORY (INHALATION) at 09:43

## 2022-10-15 RX ADMIN — Medication 25 MILLIGRAM(S): at 05:14

## 2022-10-15 RX ADMIN — CHOLESTYRAMINE 4 GRAM(S): 4 POWDER, FOR SUSPENSION ORAL at 08:41

## 2022-10-15 RX ADMIN — Medication 0: at 06:45

## 2022-10-15 RX ADMIN — OXYCODONE HYDROCHLORIDE 5 MILLIGRAM(S): 5 TABLET ORAL at 17:50

## 2022-10-15 RX ADMIN — Medication 650 MILLIGRAM(S): at 23:01

## 2022-10-15 RX ADMIN — Medication 300 MILLIGRAM(S): at 09:41

## 2022-10-15 RX ADMIN — HYDROMORPHONE HYDROCHLORIDE 0.5 MILLIGRAM(S): 2 INJECTION INTRAMUSCULAR; INTRAVENOUS; SUBCUTANEOUS at 03:45

## 2022-10-15 RX ADMIN — HYDROMORPHONE HYDROCHLORIDE 0.5 MILLIGRAM(S): 2 INJECTION INTRAMUSCULAR; INTRAVENOUS; SUBCUTANEOUS at 16:18

## 2022-10-15 RX ADMIN — CEFEPIME 100 MILLIGRAM(S): 1 INJECTION, POWDER, FOR SOLUTION INTRAMUSCULAR; INTRAVENOUS at 09:41

## 2022-10-15 RX ADMIN — GABAPENTIN 800 MILLIGRAM(S): 400 CAPSULE ORAL at 22:30

## 2022-10-15 RX ADMIN — HYDROMORPHONE HYDROCHLORIDE 0.5 MILLIGRAM(S): 2 INJECTION INTRAMUSCULAR; INTRAVENOUS; SUBCUTANEOUS at 21:34

## 2022-10-15 RX ADMIN — OXYCODONE HYDROCHLORIDE 5 MILLIGRAM(S): 5 TABLET ORAL at 17:30

## 2022-10-15 RX ADMIN — Medication 2: at 16:40

## 2022-10-15 RX ADMIN — SIMVASTATIN 10 MILLIGRAM(S): 20 TABLET, FILM COATED ORAL at 22:32

## 2022-10-15 RX ADMIN — Medication 25 MILLIGRAM(S): at 21:04

## 2022-10-15 RX ADMIN — HYDROMORPHONE HYDROCHLORIDE 0.5 MILLIGRAM(S): 2 INJECTION INTRAMUSCULAR; INTRAVENOUS; SUBCUTANEOUS at 21:04

## 2022-10-15 RX ADMIN — GABAPENTIN 800 MILLIGRAM(S): 400 CAPSULE ORAL at 13:04

## 2022-10-15 RX ADMIN — CHOLESTYRAMINE 4 GRAM(S): 4 POWDER, FOR SUSPENSION ORAL at 22:31

## 2022-10-15 RX ADMIN — HYDROMORPHONE HYDROCHLORIDE 0.5 MILLIGRAM(S): 2 INJECTION INTRAMUSCULAR; INTRAVENOUS; SUBCUTANEOUS at 04:15

## 2022-10-15 RX ADMIN — Medication 650 MILLIGRAM(S): at 22:31

## 2022-10-15 RX ADMIN — CEFEPIME 100 MILLIGRAM(S): 1 INJECTION, POWDER, FOR SOLUTION INTRAMUSCULAR; INTRAVENOUS at 22:31

## 2022-10-15 NOTE — PROGRESS NOTE ADULT - SUBJECTIVE AND OBJECTIVE BOX
CHIEF COMPLAINT: RLE pain    SUBJECTIVE: RLE pain unchanged. Associated decreased ROM. Denies fever, chills, chest pain, SOB    SIGNIFICANT INTERVAL EVENTS/OVERNIGHT EVENTS:  Admitted to ICU 10/12 with septic shock requiring pressors  Downgraded from ICU (10/14).    Review of Systems: 14 Point review of systems reviewed and reported as negative unless otherwise stated in HPI    FROM H&P:  "73 y/o female pmhx Afib on coumadin, HFpEF, DM2, peripheral neuropathy, PVD, HTN, HLD, obesity presented to ER tonight w/ fevers and right lower extremity pain. Patient called her vascular surgeons ( Dr. Yoo)  office today, who instructed her to come to the ER. Patient states the pain in her RLE worsened over the past 2 days. Today she admits to increased fatigue and fevers. She denies any SOB, CP, abdominal pain, N/V. In ER meet sepsis criteria, given 30cc/kg IVF, broad spectrum abx and pan cultured. Seen by vascular surgery in the ER, ordered imaging of RLE. Post IVF patient remained hypotensive, started on phenylephrine. Admitted to ICU. "    PHYSICAL EXAM:    T(C): 36.7 (10-15-22 @ 16:00), Max: 36.8 (10-15-22 @ 08:00)  HR: 109 (10-15-22 @ 16:00) (86 - 118)  BP: 133/90 (10-15-22 @ 16:00) (113/71 - 135/69)  RR: 18 (10-15-22 @ 16:00) (12 - 23)  SpO2: 93% (10-15-22 @ 16:00) (87% - 99%)    General: AAOx3; NAD  Head: AT/NC  ENT: Moist Mucous Membranes; No Injury  Eyes: EOMI; PERRL  Neck: Non-tender; No JVD  CVS: RRR, S1&S2, Murmur +; LE edema  Respiratory: Lungs CTA B/L; Normal Respiratory Effort  Abdomen/GI: Soft, non-tender, non-distended, no guarding, no rebound, normal bowel sounds  Extremities: Diffuse ecchymoses and edema RLE with associated pain. Decreased ROM. Left medial unstageable medial RLE ulcer without drainage.   MSK: No CVA tenderness, Normal ROM, No injury  Neuro: AAOx3, CNII-XII grossly intact, non-focal  Psych: Appropriate, Cooperative, No depression, No anxiety  Skin: As described in extremities      LABS:                          12.5   10.99 )-----------( 225      ( 15 Oct 2022 06:13 )             39.9     10-15    140  |  107  |  19  ----------------------------<  178<H>  3.7   |  28  |  0.58    Ca    8.6      15 Oct 2022 06:13  Phos  3.1     10-15  Mg     1.8     10-15      SARS-CoV-2: NotDetec (12 Oct 2022 20:26)  COVID-19 PCR: NotDetec (01 Jun 2022 11:04)  SARS-CoV-2: NotDetec (26 May 2022 12:29)    CAPILLARY BLOOD GLUCOSE      POCT Blood Glucose.: 167 mg/dL (15 Oct 2022 16:24)  POCT Blood Glucose.: 153 mg/dL (15 Oct 2022 11:41)  POCT Blood Glucose.: 142 mg/dL (15 Oct 2022 07:39)  POCT Blood Glucose.: 150 mg/dL (15 Oct 2022 06:44)  POCT Blood Glucose.: 153 mg/dL (15 Oct 2022 05:22)  POCT Blood Glucose.: 247 mg/dL (14 Oct 2022 21:25)      Culture - Urine (collected 13 Oct 2022 02:46)  Source: Clean Catch Clean Catch (Midstream)  Final Report (14 Oct 2022 13:50):    No growth    Culture - Blood (collected 12 Oct 2022 20:53)  Source: .Blood None  Gram Stain (14 Oct 2022 03:00):    Growth in aerobic bottle: Gram Negative Rods  Final Report (15 Oct 2022 11:22):    Growth in aerobic bottle: Pseudomonas putida group    ***Blood Panel PCR results on this specimen are available    approximately 3 hours after the Gram stain result.***    Gram stain, PCR, and/or culture results may not always    correspond due to difference in methodologies.    ************************************************************    This PCR assay was performed by multiplex PCR. This    Assay tests for 66 bacterial and resistance gene targets.    Please refer to the [facility] "[employer] test directory    at [email or web address] for details.  Organism: Blood Culture PCR  Pseudomonas putida group (15 Oct 2022 11:22)  Organism: Pseudomonas putida group (15 Oct 2022 11:22)  Organism: Blood Culture PCR (15 Oct 2022 11:22)    Culture - Blood (collected 12 Oct 2022 20:26)  Source: .Blood None  Gram Stain (13 Oct 2022 22:37):    Growth in aerobic bottle: Gram Negative Rods  Final Report (15 Oct 2022 19:12):    Growth in aerobic bottle: Pseudomonas putida group    See previous culture [id number]    ***Blood Panel PCR results on this specimen are available    approximately 3 hours after the Gram stain result.***    Gram stain, PCR, and/or culture results may not always    correspond due to difference in methodologies.    ************************************************************    This PCR assay was performed by multiplex PCR. This    Assay tests for 66 bacterial and resistance gene targets.    Please refer to the [facility] "[employer] test directory    at [email or web address] for details.    A1C with Estimated Average Glucose (10.13.22 @ 05:44)   A1C with Estimated Average Glucose Result: 5.8Urinalysis (10.13.22 @ 02:45)   pH Urine: 6.5   Glucose Qualitative, Urine: Negative   Blood, Urine: Trace   Color: Yellow   Urine Appearance: Clear   Bilirubin: Negative   Ketone - Urine: Negative   Specific Gravity: 1.005   Protein, Urine: Negative   Urobilinogen: Negative   Nitrite: Negative   Leukocyte Esterase Concentration: Negative Urine Microscopic-Add On (NC) (10.13.22 @ 02:45)   Red Blood Cell - Urine: 3-5 /HPF   White Blood Cell - Urine: 0-2   Bacteria: Negative   Epithelial Cells: Occasional Lactate, Blood (10.13.22 @ 00:46)   Lactate, Blood: 2.1 mmol/L   Lactate, Blood (10.12.22 @ 20:26)   Lactate, Blood: 3.2 mmol/L      RADIOLOGY:  < from: CT Lower Extremity w/ IV Cont, Right (10.12.22 @ 22:34) >  IMPRESSION:    Diffuse stranding and skin thickening in the right and visualized left   lower extremity soft tissue, which may represent edema and/or cellulitis.   No discrete organized fluid collection in the visualized soft tissue. No   obvious bone erosion or periosteal reaction. If there is continued   clinical suspicion for soft tissue/bone infection, additional imaging   (MRI and/or nuclear scan) may be obtained for further evaluation.    < end of copied text >  < from: US Duplex Venous Lower Ext Ltd, Right (10.12.22 @ 22:06) >  IMPRESSION:    The right soleal vein was not visualized. No obvious thrombus in the   visualized right lower extremity deep veins.    < end of copied text >  < from: Xray Chest 1 View- PORTABLE-Urgent (10.12.22 @ 20:15) >  Impression:    No acute pulmonary disease.    < end of copied text >      EKG:  < from: 12 Lead ECG (10.12.22 @ 23:27) >  Diagnosis Line Atrial fibrillation with rapid ventricular response  Left axis deviation  Possible Anterior infarct (cited on or before 12-OCT-2022)  Abnormal ECG    < end of copied text >      ECHO:  < from: TTE Echo Complete w/Doppler (05.14.19 @ 18:07) >  Summary:   1. Left ventricular ejection fraction, by visual estimation, is 50 to   55%.   2. Low normal global left ventricular systolic function.   3. The left ventricular diastolic function could not be assessed in this   study.   4. Moderate to severe left atrial enlargement.   5. Mildly enlarged right atrium.   6. Moderate mitral annular calcification.   7. Thickening and calcification of the anterior and posterior mitral   valve leaflets.   8. Mild mitral valve regurgitation.   9. Peak transaortic gradient equals 30.4 mmHg, mean transaortic gradient   equals 15.8 mmHg, the calculated aortic valve area equals 1.35 cm² by the   continuity equation consistent with moderate aortic stenosis.  10. Estimated pulmonary artery systolic pressure is 36.6 mmHg assuming a   right atrial pressure of 3 mmHg, which is consistent with borderline   pulmonary hypertension.  11. There is no evidence of pericardial effusion.  12. Endocardial visualization was enhanced with intravenous echo contrast.    < end of copied text >            I personally reviewed labs, imaging, ekg, orders and vitals.    Discussed case with:  [X]RN  [X]CM/IDALIA  [X]Patient  []Family  [X]Specialist:        MEDICATIONS  (STANDING):  budesonide 160 MICROgram(s)/formoterol 4.5 MICROgram(s) Inhaler 2 Puff(s) Inhalation two times a day  cefepime   IVPB 2000 milliGRAM(s) IV Intermittent every 12 hours  cholestyramine Powder (Sugar-Free) 4 Gram(s) Oral two times a day  dextrose 5%. 1000 milliLiter(s) (100 mL/Hr) IV Continuous <Continuous>  dextrose 5%. 1000 milliLiter(s) (50 mL/Hr) IV Continuous <Continuous>  dextrose 50% Injectable 25 Gram(s) IV Push once  dextrose 50% Injectable 12.5 Gram(s) IV Push once  dextrose 50% Injectable 25 Gram(s) IV Push once  gabapentin 800 milliGRAM(s) Oral three times a day  glucagon  Injectable 1 milliGRAM(s) IntraMuscular once  insulin lispro (ADMELOG) corrective regimen sliding scale   SubCutaneous Before meals and at bedtime  metoprolol tartrate 25 milliGRAM(s) Oral every 8 hours  predniSONE   Tablet 10 milliGRAM(s) Oral daily  simvastatin 10 milliGRAM(s) Oral at bedtime    MEDICATIONS  (PRN):  acetaminophen     Tablet .. 650 milliGRAM(s) Oral every 6 hours PRN Temp greater or equal to 38C (100.4F)  ALBUTerol    90 MICROgram(s) HFA Inhaler 2 Puff(s) Inhalation every 6 hours PRN Shortness of Breath and/or Wheezing  dextrose Oral Gel 15 Gram(s) Oral once PRN Blood Glucose LESS THAN 70 milliGRAM(s)/deciliter  HYDROmorphone  Injectable 0.5 milliGRAM(s) IV Push every 4 hours PRN Moderate Pain (4 - 6)  oxyCODONE    IR 5 milliGRAM(s) Oral every 6 hours PRN Moderate Pain (4 - 6)  zolpidem 5 milliGRAM(s) Oral at bedtime PRN Insomnia

## 2022-10-15 NOTE — PROGRESS NOTE ADULT - ATTENDING COMMENTS
Seen and examined at bedside. Sitting in chair fairly uncomfortable but states slightly improved from admission after several doses ABX. Denies fever, chills, or other symptoms of systemic infection. LEg very painful but slightly better over last 24 hrs; notes new lateral shin wound     B/L chronic venous insufficiency changes (left CEAP C-5 and irhgt CEAP C-6). RLE with erythema, shiny appearance and 3 small (2x2cm) wounds; cellulitis noted but wounds are all clean with some granulation tissue and scabbing    CT reviewed no necrotizing soft tissue infection    Recommend  continue antibiotics  Leg elevation to decrease edema  Wound care

## 2022-10-15 NOTE — PROGRESS NOTE ADULT - ASSESSMENT
73yo F presents with b/l lower extremity cellulitis. Sepsis called  Febrile, leukocytosis with lactic acidosis  No DVT on venous duplex  CT demonstrated diffuse stranding and skin thickening of both left and right extremities likely cellulitis and edema  Blood cultures+    Plan:  - broad spectrum IV ABx  - continue local wound care   - no indication for surgical intervention at this time  - please reconsult as necessary, thank you for consult    Plan discussed with Dr. Manjarrez

## 2022-10-15 NOTE — PROGRESS NOTE ADULT - SUBJECTIVE AND OBJECTIVE BOX
Date of service: 10-15-22 @ 11:06    Lying in bed in NAD  Has left leg pain  Feels like burning pain  Fever is down    ROS: no fever or chills; denies dizziness, no HA, no SOB or cough, no abdominal pain, no diarrhea or constipation; no dysuria    MEDICATIONS  (STANDING):  budesonide 160 MICROgram(s)/formoterol 4.5 MICROgram(s) Inhaler 2 Puff(s) Inhalation two times a day  cefepime   IVPB 2000 milliGRAM(s) IV Intermittent every 12 hours  cholestyramine Powder (Sugar-Free) 4 Gram(s) Oral two times a day  dextrose 5%. 1000 milliLiter(s) (50 mL/Hr) IV Continuous <Continuous>  dextrose 5%. 1000 milliLiter(s) (100 mL/Hr) IV Continuous <Continuous>  dextrose 50% Injectable 25 Gram(s) IV Push once  dextrose 50% Injectable 12.5 Gram(s) IV Push once  dextrose 50% Injectable 25 Gram(s) IV Push once  gabapentin 800 milliGRAM(s) Oral three times a day  glucagon  Injectable 1 milliGRAM(s) IntraMuscular once  insulin lispro (ADMELOG) corrective regimen sliding scale   SubCutaneous Before meals and at bedtime  metoprolol tartrate 25 milliGRAM(s) Oral every 8 hours  predniSONE   Tablet 10 milliGRAM(s) Oral daily  simvastatin 10 milliGRAM(s) Oral at bedtime  vancomycin  IVPB 1500 milliGRAM(s) IV Intermittent every 12 hours    Vital Signs Last 24 Hrs  T(C): 36.8 (15 Oct 2022 08:00), Max: 36.8 (15 Oct 2022 08:00)  T(F): 98.3 (15 Oct 2022 08:00), Max: 98.3 (15 Oct 2022 08:00)  HR: 86 (15 Oct 2022 08:00) (86 - 119)  BP: 113/71 (15 Oct 2022 08:00) (105/49 - 135/69)  BP(mean): 81 (15 Oct 2022 08:00) (60 - 98)  RR: 13 (15 Oct 2022 08:00) (12 - 23)  SpO2: 95% (15 Oct 2022 08:00) (87% - 99%)    Parameters below as of 15 Oct 2022 08:00  Patient On (Oxygen Delivery Method): room air     Physical exam:    Constitutional:  No acute distress  HEENT: NC/AT, EOMI, PERRLA, conjunctivae clear; ears and nose atraumatic  Neck: supple; thyroid not palpable  Back: no tenderness  Respiratory: respiratory effort normal; clear to auscultation  Cardiovascular: S1S2 regular, no murmurs  Abdomen: soft, not tender, not distended, positive BS  Genitourinary: no suprapubic tenderness  Lymphatic: no LN palpable  Musculoskeletal: no muscle tenderness, no joint swelling or tenderness  Extremities: no pedal edema  Right foot, ankle, shin, calf extensive erythema, edema, warmth; thin skin; 2 medial aspect necrotic ulcer; scant serous discharge around ankle  Left lower leg thickened skin with chronic discoloration  Neurological/ Psychiatric: AxOx3, judgement and insight normal; moving all extremities  Skin: no rashes; no palpable lesions    Labs: reviewed                        12.5   10.99 )-----------( 225      ( 15 Oct 2022 06:13 )             39.9     10-15    140  |  107  |  19  ----------------------------<  178<H>  3.7   |  28  |  0.58    Ca    8.6      15 Oct 2022 06:13  Phos  3.1     10-15  Mg     1.8     10-15    Vancomycin Level, Trough: 13.5 ug/mL (10-14 @ 08:45)               13.7   15.58 )-----------( 251      ( 13 Oct 2022 05:44 )             42.0     10-13    139  |  107  |  13  ----------------------------<  100<H>  4.0   |  26  |  0.73    Ca    8.6      13 Oct 2022 05:44  Phos  3.2     10-13  Mg     2.4     10-13    TPro  6.3  /  Alb  2.7<L>  /  TBili  1.2  /  DBili  x   /  AST  19  /  ALT  27  /  AlkPhos  46  10-13     LIVER FUNCTIONS - ( 13 Oct 2022 05:44 )  Alb: 2.7 g/dL / Pro: 6.3 gm/dL / ALK PHOS: 46 U/L / ALT: 27 U/L / AST: 19 U/L / GGT: x           Urinalysis Basic - ( 13 Oct 2022 02:45 )    Color: Yellow / Appearance: Clear / S.005 / pH: x  Gluc: x / Ketone: Negative  / Bili: Negative / Urobili: Negative   Blood: x / Protein: Negative / Nitrite: Negative   Leuk Esterase: Negative / RBC: 3-5 /HPF / WBC 0-2   Sq Epi: x / Non Sq Epi: Occasional / Bacteria: Negative    (10-12 @ 20:26)  NotDetec      Culture - Urine (collected 13 Oct 2022 02:46)  Source: Clean Catch Clean Catch (Midstream)  Final Report (14 Oct 2022 13:50):    No growth    Culture - Blood (collected 12 Oct 2022 20:53)  Source: .Blood None  Gram Stain (14 Oct 2022 03:00):    Growth in aerobic bottle: Gram Negative Rods  Preliminary Report (14 Oct 2022 17:43):    Growth in aerobic bottle: Pseudomonas putida group  Organism: Blood Culture PCR (14 Oct 2022 06:47)      -  Blood PCR Panel: NEG      Method Type: PCR    Culture - Blood (collected 12 Oct 2022 20:26)  Source: .Blood None  Gram Stain (13 Oct 2022 22:37):    Growth in aerobic bottle: Gram Negative Rods  Preliminary Report (14 Oct 2022 20:35):    Growth in aerobic bottle: Pseudomonas putida group    Radiology: all available radiological tests reviewed    Advanced directives addressed: full resuscitation Date of service: 10-15-22 @ 11:06    Lying in bed in NAD  Has left leg pain  Feels like burning pain  Fever is down  New bacteremia with PSAE reported    ROS: no fever or chills; denies dizziness, no HA, no SOB or cough, no abdominal pain, no diarrhea or constipation; no dysuria    MEDICATIONS  (STANDING):  budesonide 160 MICROgram(s)/formoterol 4.5 MICROgram(s) Inhaler 2 Puff(s) Inhalation two times a day  cefepime   IVPB 2000 milliGRAM(s) IV Intermittent every 12 hours  cholestyramine Powder (Sugar-Free) 4 Gram(s) Oral two times a day  dextrose 5%. 1000 milliLiter(s) (50 mL/Hr) IV Continuous <Continuous>  dextrose 5%. 1000 milliLiter(s) (100 mL/Hr) IV Continuous <Continuous>  dextrose 50% Injectable 25 Gram(s) IV Push once  dextrose 50% Injectable 12.5 Gram(s) IV Push once  dextrose 50% Injectable 25 Gram(s) IV Push once  gabapentin 800 milliGRAM(s) Oral three times a day  glucagon  Injectable 1 milliGRAM(s) IntraMuscular once  insulin lispro (ADMELOG) corrective regimen sliding scale   SubCutaneous Before meals and at bedtime  metoprolol tartrate 25 milliGRAM(s) Oral every 8 hours  predniSONE   Tablet 10 milliGRAM(s) Oral daily  simvastatin 10 milliGRAM(s) Oral at bedtime  vancomycin  IVPB 1500 milliGRAM(s) IV Intermittent every 12 hours    Vital Signs Last 24 Hrs  T(C): 36.8 (15 Oct 2022 08:00), Max: 36.8 (15 Oct 2022 08:00)  T(F): 98.3 (15 Oct 2022 08:00), Max: 98.3 (15 Oct 2022 08:00)  HR: 86 (15 Oct 2022 08:00) (86 - 119)  BP: 113/71 (15 Oct 2022 08:00) (105/49 - 135/69)  BP(mean): 81 (15 Oct 2022 08:00) (60 - 98)  RR: 13 (15 Oct 2022 08:00) (12 - 23)  SpO2: 95% (15 Oct 2022 08:00) (87% - 99%)    Parameters below as of 15 Oct 2022 08:00  Patient On (Oxygen Delivery Method): room air     Physical exam:    Constitutional:  No acute distress  HEENT: NC/AT, EOMI, PERRLA, conjunctivae clear; ears and nose atraumatic  Neck: supple; thyroid not palpable  Back: no tenderness  Respiratory: respiratory effort normal; clear to auscultation  Cardiovascular: S1S2 regular, no murmurs  Abdomen: soft, not tender, not distended, positive BS  Genitourinary: no suprapubic tenderness  Lymphatic: no LN palpable  Musculoskeletal: no muscle tenderness, no joint swelling or tenderness  Extremities: no pedal edema  Right foot, ankle, shin, calf extensive erythema, edema, warmth; thin skin; 2 medial aspect necrotic ulcer; scant serous discharge around ankle  Left lower leg thickened skin with chronic discoloration  Neurological/ Psychiatric: AxOx3, judgement and insight normal; moving all extremities  Skin: no rashes; no palpable lesions    Labs: reviewed                        12.5   10 )-----------( 225      ( 15 Oct 2022 06:13 )             39.9     10-15    140  |  107  |  19  ----------------------------<  178<H>  3.7   |  28  |  0.58    Ca    8.6      15 Oct 2022 06:13  Phos  3.1     10-15  Mg     1.8     10-15    Vancomycin Level, Trough: 13.5 ug/mL (10-14 @ 08:45)               13.7   15.58 )-----------( 251      ( 13 Oct 2022 05:44 )             42.0     10-13    139  |  107  |  13  ----------------------------<  100<H>  4.0   |  26  |  0.73    Ca    8.6      13 Oct 2022 05:44  Phos  3.2     10-13  Mg     2.4     10-13    TPro  6.3  /  Alb  2.7<L>  /  TBili  1.2  /  DBili  x   /  AST  19  /  ALT  27  /  AlkPhos  46  10-13     LIVER FUNCTIONS - ( 13 Oct 2022 05:44 )  Alb: 2.7 g/dL / Pro: 6.3 gm/dL / ALK PHOS: 46 U/L / ALT: 27 U/L / AST: 19 U/L / GGT: x           Urinalysis Basic - ( 13 Oct 2022 02:45 )    Color: Yellow / Appearance: Clear / S.005 / pH: x  Gluc: x / Ketone: Negative  / Bili: Negative / Urobili: Negative   Blood: x / Protein: Negative / Nitrite: Negative   Leuk Esterase: Negative / RBC: 3-5 /HPF / WBC 0-2   Sq Epi: x / Non Sq Epi: Occasional / Bacteria: Negative    (10-12 @ 20:26)  NotDetec      Culture - Urine (collected 13 Oct 2022 02:46)  Source: Clean Catch Clean Catch (Midstream)  Final Report (14 Oct 2022 13:50):    No growth    Culture - Blood (collected 12 Oct 2022 20:53)  Source: .Blood None  Gram Stain (14 Oct 2022 03:00):    Growth in aerobic bottle: Gram Negative Rods  Preliminary Report (14 Oct 2022 17:43):    Growth in aerobic bottle: Pseudomonas putida group  Organism: Blood Culture PCR (14 Oct 2022 06:47)      -  Blood PCR Panel: NEG      Method Type: PCR    Culture - Blood (collected 12 Oct 2022 20:26)  Source: .Blood None  Gram Stain (13 Oct 2022 22:37):    Growth in aerobic bottle: Gram Negative Rods  Preliminary Report (14 Oct 2022 20:35):    Growth in aerobic bottle: Pseudomonas putida group    Radiology: all available radiological tests reviewed    Advanced directives addressed: full resuscitation

## 2022-10-15 NOTE — PROGRESS NOTE ADULT - SUBJECTIVE AND OBJECTIVE BOX
Patient was seen and evaluated at bedside this morning. No acute events overnight. Still complains of lower extremity pain. VS reviewed.    PHYSICAL EXAM:  GENERAL: NAD, AOx3, well developed  HEAD:  Atraumatic, Normocephalic  EYES: EOMI, conjunctiva and sclera clear  NECK: Supple, No JVD  CHEST/LUNG: Unlabored respirations b/l  HEART: S1 and S2 normal  ABDOMEN: soft, nondistended, nontender  EXTREMITIES:  dopplerable R. DP/PT. RLE with discoloration and swelling with medial shin ulcer. Profound edema up to knee. motor and sensation intact. tenderness to palpation  NERVOUS SYSTEM:  AO X3, speech clear. No deficits   MSK: full ROM, no deformities  SKIN: warm to touch. No rashes or lesions         MEDICATIONS  (STANDING):  budesonide 160 MICROgram(s)/formoterol 4.5 MICROgram(s) Inhaler 2 Puff(s) Inhalation two times a day  cefepime   IVPB 2000 milliGRAM(s) IV Intermittent every 12 hours  cholestyramine Powder (Sugar-Free) 4 Gram(s) Oral two times a day  dextrose 5%. 1000 milliLiter(s) (100 mL/Hr) IV Continuous <Continuous>  dextrose 5%. 1000 milliLiter(s) (50 mL/Hr) IV Continuous <Continuous>  dextrose 50% Injectable 25 Gram(s) IV Push once  dextrose 50% Injectable 12.5 Gram(s) IV Push once  dextrose 50% Injectable 25 Gram(s) IV Push once  gabapentin 800 milliGRAM(s) Oral three times a day  glucagon  Injectable 1 milliGRAM(s) IntraMuscular once  insulin lispro (ADMELOG) corrective regimen sliding scale   SubCutaneous Before meals and at bedtime  metoprolol tartrate 25 milliGRAM(s) Oral every 8 hours  predniSONE   Tablet 10 milliGRAM(s) Oral daily  simvastatin 10 milliGRAM(s) Oral at bedtime    MEDICATIONS  (PRN):  acetaminophen     Tablet .. 650 milliGRAM(s) Oral every 6 hours PRN Temp greater or equal to 38C (100.4F)  ALBUTerol    90 MICROgram(s) HFA Inhaler 2 Puff(s) Inhalation every 6 hours PRN Shortness of Breath and/or Wheezing  dextrose Oral Gel 15 Gram(s) Oral once PRN Blood Glucose LESS THAN 70 milliGRAM(s)/deciliter  HYDROmorphone  Injectable 0.5 milliGRAM(s) IV Push every 4 hours PRN Moderate Pain (4 - 6)  oxyCODONE    IR 5 milliGRAM(s) Oral every 6 hours PRN Moderate Pain (4 - 6)  zolpidem 5 milliGRAM(s) Oral at bedtime PRN Insomnia      PAST MEDICAL & SURGICAL HISTORY:  Diabetes mellitus type II, controlled      Atrial fibrillation      Congestive heart failure      Dyspnea      Morbid obesity with BMI of 40.0-44.9, adult      Neuropathy      Peripheral venous insufficiency      Thyroid nodule      HTN (hypertension)      Cellulitis      History of esophagogastroduodenoscopy (EGD)      H/O colonoscopy      Other complications of gastric band procedure      S/P left knee arthroscopy      Status post medial meniscus repair          Vital Signs Last 24 Hrs  T(C): 36.7 (15 Oct 2022 16:00), Max: 36.8 (15 Oct 2022 08:00)  T(F): 98.1 (15 Oct 2022 16:00), Max: 98.3 (15 Oct 2022 08:00)  HR: 109 (15 Oct 2022 16:00) (86 - 118)  BP: 133/90 (15 Oct 2022 16:00) (113/71 - 135/69)  BP(mean): 101 (15 Oct 2022 16:00) (60 - 101)  RR: 18 (15 Oct 2022 16:00) (12 - 23)  SpO2: 93% (15 Oct 2022 16:00) (87% - 99%)    Parameters below as of 15 Oct 2022 08:00  Patient On (Oxygen Delivery Method): room air        I&O's Summary    14 Oct 2022 07:01  -  15 Oct 2022 07:00  --------------------------------------------------------  IN: 1780 mL / OUT: 1550 mL / NET: 230 mL    15 Oct 2022 07:01  -  15 Oct 2022 16:59  --------------------------------------------------------  IN: 1410 mL / OUT: 300 mL / NET: 1110 mL                              12.5   10.99 )-----------( 225      ( 15 Oct 2022 06:13 )             39.9     10-15    140  |  107  |  19  ----------------------------<  178<H>  3.7   |  28  |  0.58    Ca    8.6      15 Oct 2022 06:13  Phos  3.1     10-15  Mg     1.8     10-15            Culture - Urine (collected 10-13-22 @ 02:46)  Source: Clean Catch Clean Catch (Midstream)  Final Report (10-14-22 @ 13:50):    No growth    Culture - Blood (collected 10-12-22 @ 20:53)  Source: .Blood None  Gram Stain (10-14-22 @ 03:00):    Growth in aerobic bottle: Gram Negative Rods  Final Report (10-15-22 @ 11:22):    Growth in aerobic bottle: Pseudomonas putida group    ***Blood Panel PCR results on this specimen are available    approximately 3 hours after the Gram stain result.***    Gram stain, PCR, and/or culture results may not always    correspond due to difference in methodologies.    ************************************************************    This PCR assay was performed by multiplex PCR. This    Assay tests for 66 bacterial and resistance gene targets.    Please refer to the [facility] test directory    at [email or web address] for details.  Organism: Blood Culture PCR  Pseudomonas putida group (10-15-22 @ 11:22)  Organism: Pseudomonas putida group (10-15-22 @ 11:22)  Organism: Blood Culture PCR (10-15-22 @ 11:22)    Culture - Blood (collected 10-12-22 @ 20:26)  Source: .Blood None  Gram Stain (10-13-22 @ 22:37):    Growth in aerobic bottle: Gram Negative Rods  Preliminary Report (10-14-22 @ 20:35):    Growth in aerobic bottle: Pseudomonas putida group    See previous culture [id number]    ***Blood Panel PCR results on this specimen are available    approximately 3 hours after the Gram stain result.***    Gram stain, PCR, and/or culture results may not always    correspond due to difference in methodologies.    ************************************************************    This PCR assay was performed by multiplex PCR. This    Assay tests for 66 bacterial and resistance gene targets.    Please refer to the [facility] test directory    at [email or web address] for details.

## 2022-10-15 NOTE — PHYSICAL THERAPY INITIAL EVALUATION ADULT - GENERAL OBSERVATIONS, REHAB EVAL
pt rec'd supine in bed on ICU, monitors, pt obese, B LE w/ purplish discoloration and RLE w/ erythema, weeping edema, wounds RLE and scabbed wound (looks older) L dorsal foot.

## 2022-10-15 NOTE — PHYSICAL THERAPY INITIAL EVALUATION ADULT - PERTINENT HX OF CURRENT PROBLEM, REHAB EVAL
admitted on 10/12 for RLE pain admitted with septic shock due to RLE cellulitis. required pressor support, now off. also adm w/. elev INR of 4, today 1.56

## 2022-10-15 NOTE — PHYSICAL THERAPY INITIAL EVALUATION ADULT - ACTIVE RANGE OF MOTION EXAMINATION, REHAB EVAL
except R knee flex ltd by pain/bilateral upper extremity Active ROM was WFL (within functional limits)/bilateral  lower extremity Active ROM was WFL (within functional limits)

## 2022-10-15 NOTE — PROGRESS NOTE ADULT - ASSESSMENT
73 y/o female h/o obesity, Afib on coumadin, HFpEF, DM2, peripheral neuropathy, PVD, HTN, HLD, chronic b/l legs venous insufficiency, UC was admitted on 10/12 for fever and right lower extremity redness, swelling and pain. Patient called her vascular surgeons ( Dr. Yoo) office, who instructed her to come to the ER. Patient states the pain in her RLE worsened over the past 2 days PTA. She admits to increased fatigue and fever. She denies any SOB, CP, abdominal pain, N/V. In ER she was noted hypotensive and met sepsis criteria, given 30cc/kg, started on phenylephrine and received clindamycin, vancomycin IV and cefepime.     1. Febrile syndrome. Sepsis with PSPU. Extensive RLE cellulitis. B/L LE chronic stasis dermatitis. Obesity. Allergy to PCN.  -concern about toxic shock syndrome raised  -leukocytosis   -BC x 2, urine c/s noted  -on vancomycin 1500 mg IV q12h, cefepime 2 gm IV q12h # 3  -tolerating abx well so far; no side effects noted  -vancomycin trough level is therapeutic  -s/p clindamycin  -d/c vancomycin  -continue abx coverage with cefepime  -f/u cultures improving  -leukocytosis   -elevate legs  -pain management  -elevate legs  -monitor temps  -f/u CBC  -supportive care  2. Other issues:   -care per medicine    d/w Dr. Delatorre 71 y/o female h/o obesity, Afib on coumadin, HFpEF, DM2, peripheral neuropathy, PVD, HTN, HLD, chronic b/l legs venous insufficiency, UC was admitted on 10/12 for fever and right lower extremity redness, swelling and pain. Patient called her vascular surgeons ( Dr. Yoo) office, who instructed her to come to the ER. Patient states the pain in her RLE worsened over the past 2 days PTA. She admits to increased fatigue and fever. She denies any SOB, CP, abdominal pain, N/V. In ER she was noted hypotensive and met sepsis criteria, given 30cc/kg, started on phenylephrine and received clindamycin, vancomycin IV and cefepime.     1. Febrile syndrome. Sepsis with PSPU. Extensive RLE cellulitis. B/L LE chronic stasis dermatitis. Obesity. Allergy to PCN.  -concern about toxic shock syndrome raised  -leukocytosis   -BC x 2, urine c/s noted  -on vancomycin 1500 mg IV q12h, cefepime 2 gm IV q12h # 3  -tolerating abx well so far; no side effects noted  -vancomycin trough level is therapeutic  -s/p clindamycin  -d/c vancomycin  -continue abx coverage with cefepime  -f/u cultures   -repeat BC x 2 in AM  -leukocytosis improving  -elevate legs  -pain management  -elevate legs  -monitor temps  -f/u CBC  -supportive care  2. Other issues:   -care per medicine    d/w Dr. Delatorre

## 2022-10-15 NOTE — PROGRESS NOTE ADULT - ASSESSMENT
73 y/o female with pmhx of afib on coumadin, HFpEF, peripheral neuroapthy, DM II, PVD, HTN, HLD admitted on 11/12 for RLE pain admitted with:      Septic Shock due to Diffuse RLE Cellulitis/Soft Tissue infection with Associated Pseduomonas bacteremia.   Crhonic Atrial Fibrillation (on eliquis)  DM II  On chronic Prednisone (unclear for with pathology)  PVD  HTN  HLD  Peripheral Neuropathy      COntinue cefepime (reported sensitivity  Pain control/BOwel Regimen  ID consult and on board  Continue Coumaind. INR foal 2-3  Blood Culture with pseduomonas. Repeat blood cultures  Home BP medications as blood pressure can tolerated  Continue BB  Continue daily prednisone  Vacular Surgery consulted->no acute intervention.   DVT Prophylaxis

## 2022-10-16 LAB
APTT BLD: 30.4 SEC — SIGNIFICANT CHANGE UP (ref 27.5–35.5)
GLUCOSE BLDC GLUCOMTR-MCNC: 195 MG/DL — HIGH (ref 70–99)
GLUCOSE BLDC GLUCOMTR-MCNC: 215 MG/DL — HIGH (ref 70–99)
INR BLD: 1.3 RATIO — HIGH (ref 0.88–1.16)
PROTHROM AB SERPL-ACNC: 15.1 SEC — HIGH (ref 10.5–13.4)

## 2022-10-16 PROCEDURE — 99232 SBSQ HOSP IP/OBS MODERATE 35: CPT

## 2022-10-16 RX ORDER — MORPHINE SULFATE 50 MG/1
15 CAPSULE, EXTENDED RELEASE ORAL EVERY 12 HOURS
Refills: 0 | Status: DISCONTINUED | OUTPATIENT
Start: 2022-10-16 | End: 2022-10-23

## 2022-10-16 RX ORDER — WARFARIN SODIUM 2.5 MG/1
5 TABLET ORAL ONCE
Refills: 0 | Status: COMPLETED | OUTPATIENT
Start: 2022-10-16 | End: 2022-10-16

## 2022-10-16 RX ADMIN — BUDESONIDE AND FORMOTEROL FUMARATE DIHYDRATE 2 PUFF(S): 160; 4.5 AEROSOL RESPIRATORY (INHALATION) at 08:37

## 2022-10-16 RX ADMIN — HYDROMORPHONE HYDROCHLORIDE 0.5 MILLIGRAM(S): 2 INJECTION INTRAMUSCULAR; INTRAVENOUS; SUBCUTANEOUS at 10:35

## 2022-10-16 RX ADMIN — CEFEPIME 100 MILLIGRAM(S): 1 INJECTION, POWDER, FOR SOLUTION INTRAMUSCULAR; INTRAVENOUS at 10:34

## 2022-10-16 RX ADMIN — HYDROMORPHONE HYDROCHLORIDE 0.5 MILLIGRAM(S): 2 INJECTION INTRAMUSCULAR; INTRAVENOUS; SUBCUTANEOUS at 01:19

## 2022-10-16 RX ADMIN — GABAPENTIN 800 MILLIGRAM(S): 400 CAPSULE ORAL at 21:04

## 2022-10-16 RX ADMIN — GABAPENTIN 800 MILLIGRAM(S): 400 CAPSULE ORAL at 05:40

## 2022-10-16 RX ADMIN — HYDROMORPHONE HYDROCHLORIDE 0.5 MILLIGRAM(S): 2 INJECTION INTRAMUSCULAR; INTRAVENOUS; SUBCUTANEOUS at 11:35

## 2022-10-16 RX ADMIN — CHOLESTYRAMINE 4 GRAM(S): 4 POWDER, FOR SUSPENSION ORAL at 10:35

## 2022-10-16 RX ADMIN — Medication 25 MILLIGRAM(S): at 13:01

## 2022-10-16 RX ADMIN — OXYCODONE HYDROCHLORIDE 5 MILLIGRAM(S): 5 TABLET ORAL at 00:03

## 2022-10-16 RX ADMIN — CEFEPIME 100 MILLIGRAM(S): 1 INJECTION, POWDER, FOR SOLUTION INTRAMUSCULAR; INTRAVENOUS at 21:06

## 2022-10-16 RX ADMIN — MORPHINE SULFATE 15 MILLIGRAM(S): 50 CAPSULE, EXTENDED RELEASE ORAL at 13:01

## 2022-10-16 RX ADMIN — GABAPENTIN 800 MILLIGRAM(S): 400 CAPSULE ORAL at 13:01

## 2022-10-16 RX ADMIN — HYDROMORPHONE HYDROCHLORIDE 0.5 MILLIGRAM(S): 2 INJECTION INTRAMUSCULAR; INTRAVENOUS; SUBCUTANEOUS at 22:17

## 2022-10-16 RX ADMIN — Medication 25 MILLIGRAM(S): at 05:40

## 2022-10-16 RX ADMIN — BUDESONIDE AND FORMOTEROL FUMARATE DIHYDRATE 2 PUFF(S): 160; 4.5 AEROSOL RESPIRATORY (INHALATION) at 21:11

## 2022-10-16 RX ADMIN — Medication 40 MILLIEQUIVALENT(S): at 00:16

## 2022-10-16 RX ADMIN — SIMVASTATIN 10 MILLIGRAM(S): 20 TABLET, FILM COATED ORAL at 21:05

## 2022-10-16 RX ADMIN — HYDROMORPHONE HYDROCHLORIDE 0.5 MILLIGRAM(S): 2 INJECTION INTRAMUSCULAR; INTRAVENOUS; SUBCUTANEOUS at 22:47

## 2022-10-16 RX ADMIN — HYDROMORPHONE HYDROCHLORIDE 0.5 MILLIGRAM(S): 2 INJECTION INTRAMUSCULAR; INTRAVENOUS; SUBCUTANEOUS at 01:49

## 2022-10-16 RX ADMIN — BUDESONIDE AND FORMOTEROL FUMARATE DIHYDRATE 2 PUFF(S): 160; 4.5 AEROSOL RESPIRATORY (INHALATION) at 00:14

## 2022-10-16 RX ADMIN — OXYCODONE HYDROCHLORIDE 5 MILLIGRAM(S): 5 TABLET ORAL at 00:33

## 2022-10-16 RX ADMIN — WARFARIN SODIUM 5 MILLIGRAM(S): 2.5 TABLET ORAL at 21:06

## 2022-10-16 RX ADMIN — OXYCODONE HYDROCHLORIDE 5 MILLIGRAM(S): 5 TABLET ORAL at 21:42

## 2022-10-16 RX ADMIN — Medication 10 MILLIGRAM(S): at 10:35

## 2022-10-16 RX ADMIN — HYDROMORPHONE HYDROCHLORIDE 0.5 MILLIGRAM(S): 2 INJECTION INTRAMUSCULAR; INTRAVENOUS; SUBCUTANEOUS at 05:43

## 2022-10-16 RX ADMIN — HYDROMORPHONE HYDROCHLORIDE 0.5 MILLIGRAM(S): 2 INJECTION INTRAMUSCULAR; INTRAVENOUS; SUBCUTANEOUS at 06:13

## 2022-10-16 RX ADMIN — Medication 4: at 21:05

## 2022-10-16 RX ADMIN — OXYCODONE HYDROCHLORIDE 5 MILLIGRAM(S): 5 TABLET ORAL at 21:12

## 2022-10-16 RX ADMIN — HYDROMORPHONE HYDROCHLORIDE 0.5 MILLIGRAM(S): 2 INJECTION INTRAMUSCULAR; INTRAVENOUS; SUBCUTANEOUS at 18:17

## 2022-10-16 RX ADMIN — Medication 2: at 17:12

## 2022-10-16 RX ADMIN — MORPHINE SULFATE 15 MILLIGRAM(S): 50 CAPSULE, EXTENDED RELEASE ORAL at 14:01

## 2022-10-16 RX ADMIN — Medication 25 MILLIGRAM(S): at 21:05

## 2022-10-16 NOTE — PROGRESS NOTE ADULT - ASSESSMENT
73 y/o female h/o obesity, Afib on coumadin, HFpEF, DM2, peripheral neuropathy, PVD, HTN, HLD, chronic b/l legs venous insufficiency, UC was admitted on 10/12 for fever and right lower extremity redness, swelling and pain. Patient called her vascular surgeons ( Dr. Yoo) office, who instructed her to come to the ER. Patient states the pain in her RLE worsened over the past 2 days PTA. She admits to increased fatigue and fever. She denies any SOB, CP, abdominal pain, N/V. In ER she was noted hypotensive and met sepsis criteria, given 30cc/kg, started on phenylephrine and received clindamycin, vancomycin IV and cefepime.     1. Febrile syndrome. Sepsis with PSPU. Extensive RLE cellulitis. B/L LE chronic stasis dermatitis. Obesity. Allergy to PCN.  -concern about toxic shock syndrome raised  -leukocytosis   -BC x 2, urine c/s noted  -s/p vancomycin 1500 mg IV q12h  -on cefepime 2 gm IV q12h # 4  -tolerating abx well so far; no side effects noted  -vancomycin trough level is therapeutic  -s/p clindamycin  -continue abx coverage   -f/u cultures   -repeat BC x 2 collected  -leukocytosis improving  -pain management  -elevate legs  -monitor temps  -f/u CBC  -supportive care  2. Other issues:   -care per medicine    d/w Dr. Delatorre

## 2022-10-16 NOTE — PROGRESS NOTE ADULT - ASSESSMENT
71 y/o female with pmhx of afib on coumadin, HFpEF, peripheral neuroapthy, DM II, PVD, HTN, HLD admitted on 11/12 for RLE pain admitted with:      Septic Shock due to Diffuse RLE Cellulitis/Soft Tissue infection with Associated Pseduomonas bacteremia.   Crhonic Atrial Fibrillation (on eliquis)  DM II  On chronic Prednisone (unclear for with pathology)  PVD  HTN  HLD  Peripheral Neuropathy      COntinue cefepime (reported sensitivity  Pain control/BOwel Regimen. Start Morphine ER (10/16)  ID consult and on board  Continue Coumadin. Goal 2-3  Blood Culture with pseduomonas. Repeat blood cultures (10/16) pending  Home BP medications as blood pressure can tolerated  Continue BB  Continue home daily prednisone  Vacular Surgery consulted->no acute intervention.   DVT Prophylaxis

## 2022-10-16 NOTE — PROGRESS NOTE ADULT - SUBJECTIVE AND OBJECTIVE BOX
CHIEF COMPLAINT: RLE pain    SUBJECTIVE: RLE pain unchanged. Associated decreased ROM. Decreased edema; Denies fever, chills, chest pain, SOB    SIGNIFICANT INTERVAL EVENTS/OVERNIGHT EVENTS:  Admitted to ICU 10/12 with septic shock requiring pressors  Downgraded from ICU (10/14).    Review of Systems: 14 Point review of systems reviewed and reported as negative unless otherwise stated in HPI    FROM H&P:  "71 y/o female pmhx Afib on coumadin, HFpEF, DM2, peripheral neuropathy, PVD, HTN, HLD, obesity presented to ER tonight w/ fevers and right lower extremity pain. Patient called her vascular surgeons ( Dr. Yoo)  office today, who instructed her to come to the ER. Patient states the pain in her RLE worsened over the past 2 days. Today she admits to increased fatigue and fevers. She denies any SOB, CP, abdominal pain, N/V. In ER meet sepsis criteria, given 30cc/kg IVF, broad spectrum abx and pan cultured. Seen by vascular surgery in the ER, ordered imaging of RLE. Post IVF patient remained hypotensive, started on phenylephrine. Admitted to ICU. "    PHYSICAL EXAM:    T(C): 36.6 (10-16-22 @ 07:32), Max: 38.1 (10-15-22 @ 22:25)  HR: 99 (10-16-22 @ 12:42) (97 - 114)  BP: 139/89 (10-16-22 @ 12:42) (119/87 - 145/91)  RR: 18 (10-16-22 @ 07:32) (18 - 20)  SpO2: 93% (10-16-22 @ 08:40) (93% - 95%)    General: AAOx3; NAD  Head: AT/NC  ENT: Moist Mucous Membranes; No Injury  Eyes: EOMI; PERRL  Neck: Non-tender; No JVD  CVS: RRR, S1&S2, Murmur +; LE edema  Respiratory: Lungs CTA B/L; Normal Respiratory Effort  Abdomen/GI: Soft, non-tender, non-distended, no guarding, no rebound, normal bowel sounds  Extremities: Diffuse ecchymoses and edema RLE with associated pain. Decreased ROM. Left medial unstageable medial and lateral RLE ulcer without drainage.   MSK: No CVA tenderness, Normal ROM, No injury  Neuro: AAOx3, CNII-XII grossly intact, non-focal  Psych: Appropriate, Cooperative, No depression, No anxiety  Skin: As described in extremities      LABS:                            12.5   10.99 )-----------( 225      ( 15 Oct 2022 06:13 )             39.9     10-15    140  |  107  |  19  ----------------------------<  178<H>  3.7   |  28  |  0.58    Ca    8.6      15 Oct 2022 06:13  Phos  3.1     10-15  Mg     1.8     10-15      SARS-CoV-2: NotDetec (12 Oct 2022 20:26)  COVID-19 PCR: NotDetec (01 Jun 2022 11:04)  SARS-CoV-2: NotDetec (26 May 2022 12:29)    CAPILLARY BLOOD GLUCOSE      POCT Blood Glucose.: 167 mg/dL (15 Oct 2022 16:24)  POCT Blood Glucose.: 153 mg/dL (15 Oct 2022 11:41)  POCT Blood Glucose.: 142 mg/dL (15 Oct 2022 07:39)  POCT Blood Glucose.: 150 mg/dL (15 Oct 2022 06:44)  POCT Blood Glucose.: 153 mg/dL (15 Oct 2022 05:22)  POCT Blood Glucose.: 247 mg/dL (14 Oct 2022 21:25)      Culture - Urine (collected 13 Oct 2022 02:46)  Source: Clean Catch Clean Catch (Midstream)  Final Report (14 Oct 2022 13:50):    No growth    Culture - Blood (collected 12 Oct 2022 20:53)  Source: .Blood None  Gram Stain (14 Oct 2022 03:00):    Growth in aerobic bottle: Gram Negative Rods  Final Report (15 Oct 2022 11:22):    Growth in aerobic bottle: Pseudomonas putida group    ***Blood Panel PCR results on this specimen are available    approximately 3 hours after the Gram stain result.***    Gram stain, PCR, and/or culture results may not always    correspond due to difference in methodologies.    ************************************************************    This PCR assay was performed by multiplex PCR. This    Assay tests for 66 bacterial and resistance gene targets.    Please refer to the [email or web address] test directory    at [email or web address] for details.  Organism: Blood Culture PCR  Pseudomonas putida group (15 Oct 2022 11:22)  Organism: Pseudomonas putida group (15 Oct 2022 11:22)  Organism: Blood Culture PCR (15 Oct 2022 11:22)    Culture - Blood (collected 12 Oct 2022 20:26)  Source: .Blood None  Gram Stain (13 Oct 2022 22:37):    Growth in aerobic bottle: Gram Negative Rods  Final Report (15 Oct 2022 19:12):    Growth in aerobic bottle: Pseudomonas putida group    See previous culture [id number]    ***Blood Panel PCR results on this specimen are available    approximately 3 hours after the Gram stain result.***    Gram stain, PCR, and/or culture results may not always    correspond due to difference in methodologies.    ************************************************************    This PCR assay was performed by multiplex PCR. This    Assay tests for 66 bacterial and resistance gene targets.    Please refer to the [email or web address] test directory    at [email or web address] for details.    A1C with Estimated Average Glucose (10.13.22 @ 05:44)   A1C with Estimated Average Glucose Result: 5.8Urinalysis (10.13.22 @ 02:45)   pH Urine: 6.5   Glucose Qualitative, Urine: Negative   Blood, Urine: Trace   Color: Yellow   Urine Appearance: Clear   Bilirubin: Negative   Ketone - Urine: Negative   Specific Gravity: 1.005   Protein, Urine: Negative   Urobilinogen: Negative   Nitrite: Negative   Leukocyte Esterase Concentration: Negative Urine Microscopic-Add On (NC) (10.13.22 @ 02:45)   Red Blood Cell - Urine: 3-5 /HPF   White Blood Cell - Urine: 0-2   Bacteria: Negative   Epithelial Cells: Occasional Lactate, Blood (10.13.22 @ 00:46)   Lactate, Blood: 2.1 mmol/L   Lactate, Blood (10.12.22 @ 20:26)   Lactate, Blood: 3.2 mmol/L      RADIOLOGY:  < from: CT Lower Extremity w/ IV Cont, Right (10.12.22 @ 22:34) >  IMPRESSION:    Diffuse stranding and skin thickening in the right and visualized left   lower extremity soft tissue, which may represent edema and/or cellulitis.   No discrete organized fluid collection in the visualized soft tissue. No   obvious bone erosion or periosteal reaction. If there is continued   clinical suspicion for soft tissue/bone infection, additional imaging   (MRI and/or nuclear scan) may be obtained for further evaluation.    < end of copied text >  < from: US Duplex Venous Lower Ext Ltd, Right (10.12.22 @ 22:06) >  IMPRESSION:    The right soleal vein was not visualized. No obvious thrombus in the   visualized right lower extremity deep veins.    < end of copied text >  < from: Xray Chest 1 View- PORTABLE-Urgent (10.12.22 @ 20:15) >  Impression:    No acute pulmonary disease.    < end of copied text >      EKG:  < from: 12 Lead ECG (10.12.22 @ 23:27) >  Diagnosis Line Atrial fibrillation with rapid ventricular response  Left axis deviation  Possible Anterior infarct (cited on or before 12-OCT-2022)  Abnormal ECG    < end of copied text >      ECHO:  < from: TTE Echo Complete w/Doppler (05.14.19 @ 18:07) >  Summary:   1. Left ventricular ejection fraction, by visual estimation, is 50 to   55%.   2. Low normal global left ventricular systolic function.   3. The left ventricular diastolic function could not be assessed in this   study.   4. Moderate to severe left atrial enlargement.   5. Mildly enlarged right atrium.   6. Moderate mitral annular calcification.   7. Thickening and calcification of the anterior and posterior mitral   valve leaflets.   8. Mild mitral valve regurgitation.   9. Peak transaortic gradient equals 30.4 mmHg, mean transaortic gradient   equals 15.8 mmHg, the calculated aortic valve area equals 1.35 cm² by the   continuity equation consistent with moderate aortic stenosis.  10. Estimated pulmonary artery systolic pressure is 36.6 mmHg assuming a   right atrial pressure of 3 mmHg, which is consistent with borderline   pulmonary hypertension.  11. There is no evidence of pericardial effusion.  12. Endocardial visualization was enhanced with intravenous echo contrast.    < end of copied text >            I personally reviewed labs, imaging, ekg, orders and vitals.    Discussed case with:  [X]RN  [X]CM/IDALIA  [X]Patient  []Family  [X]Specialist:        MEDICATIONS  (STANDING):  budesonide 160 MICROgram(s)/formoterol 4.5 MICROgram(s) Inhaler 2 Puff(s) Inhalation two times a day  cefepime   IVPB 2000 milliGRAM(s) IV Intermittent every 12 hours  cholestyramine Powder (Sugar-Free) 4 Gram(s) Oral two times a day  dextrose 5%. 1000 milliLiter(s) (100 mL/Hr) IV Continuous <Continuous>  dextrose 5%. 1000 milliLiter(s) (50 mL/Hr) IV Continuous <Continuous>  dextrose 50% Injectable 25 Gram(s) IV Push once  dextrose 50% Injectable 12.5 Gram(s) IV Push once  dextrose 50% Injectable 25 Gram(s) IV Push once  gabapentin 800 milliGRAM(s) Oral three times a day  glucagon  Injectable 1 milliGRAM(s) IntraMuscular once  insulin lispro (ADMELOG) corrective regimen sliding scale   SubCutaneous Before meals and at bedtime  metoprolol tartrate 25 milliGRAM(s) Oral every 8 hours  predniSONE   Tablet 10 milliGRAM(s) Oral daily  simvastatin 10 milliGRAM(s) Oral at bedtime    MEDICATIONS  (PRN):  acetaminophen     Tablet .. 650 milliGRAM(s) Oral every 6 hours PRN Temp greater or equal to 38C (100.4F)  ALBUTerol    90 MICROgram(s) HFA Inhaler 2 Puff(s) Inhalation every 6 hours PRN Shortness of Breath and/or Wheezing  dextrose Oral Gel 15 Gram(s) Oral once PRN Blood Glucose LESS THAN 70 milliGRAM(s)/deciliter  HYDROmorphone  Injectable 0.5 milliGRAM(s) IV Push every 4 hours PRN Moderate Pain (4 - 6)  oxyCODONE    IR 5 milliGRAM(s) Oral every 6 hours PRN Moderate Pain (4 - 6)  zolpidem 5 milliGRAM(s) Oral at bedtime PRN Insomnia

## 2022-10-17 LAB
ANION GAP SERPL CALC-SCNC: 5 MMOL/L — SIGNIFICANT CHANGE UP (ref 5–17)
BASOPHILS # BLD AUTO: 0.05 K/UL — SIGNIFICANT CHANGE UP (ref 0–0.2)
BASOPHILS NFR BLD AUTO: 0.5 % — SIGNIFICANT CHANGE UP (ref 0–2)
BUN SERPL-MCNC: 16 MG/DL — SIGNIFICANT CHANGE UP (ref 7–23)
CALCIUM SERPL-MCNC: 8.9 MG/DL — SIGNIFICANT CHANGE UP (ref 8.5–10.1)
CHLORIDE SERPL-SCNC: 104 MMOL/L — SIGNIFICANT CHANGE UP (ref 96–108)
CO2 SERPL-SCNC: 30 MMOL/L — SIGNIFICANT CHANGE UP (ref 22–31)
CREAT SERPL-MCNC: 0.56 MG/DL — SIGNIFICANT CHANGE UP (ref 0.5–1.3)
EGFR: 96 ML/MIN/1.73M2 — SIGNIFICANT CHANGE UP
EOSINOPHIL # BLD AUTO: 0.2 K/UL — SIGNIFICANT CHANGE UP (ref 0–0.5)
EOSINOPHIL NFR BLD AUTO: 2.1 % — SIGNIFICANT CHANGE UP (ref 0–6)
GLUCOSE BLDC GLUCOMTR-MCNC: 104 MG/DL — HIGH (ref 70–99)
GLUCOSE BLDC GLUCOMTR-MCNC: 164 MG/DL — HIGH (ref 70–99)
GLUCOSE SERPL-MCNC: 114 MG/DL — HIGH (ref 70–99)
HCT VFR BLD CALC: 40.6 % — SIGNIFICANT CHANGE UP (ref 34.5–45)
HGB BLD-MCNC: 12.8 G/DL — SIGNIFICANT CHANGE UP (ref 11.5–15.5)
IMM GRANULOCYTES NFR BLD AUTO: 1.6 % — HIGH (ref 0–0.9)
INR BLD: 1.2 RATIO — HIGH (ref 0.88–1.16)
LYMPHOCYTES # BLD AUTO: 1.15 K/UL — SIGNIFICANT CHANGE UP (ref 1–3.3)
LYMPHOCYTES # BLD AUTO: 12.2 % — LOW (ref 13–44)
MAGNESIUM SERPL-MCNC: 2.1 MG/DL — SIGNIFICANT CHANGE UP (ref 1.6–2.6)
MCHC RBC-ENTMCNC: 31.5 GM/DL — LOW (ref 32–36)
MCHC RBC-ENTMCNC: 31.5 PG — SIGNIFICANT CHANGE UP (ref 27–34)
MCV RBC AUTO: 100 FL — SIGNIFICANT CHANGE UP (ref 80–100)
MONOCYTES # BLD AUTO: 1.01 K/UL — HIGH (ref 0–0.9)
MONOCYTES NFR BLD AUTO: 10.8 % — SIGNIFICANT CHANGE UP (ref 2–14)
NEUTROPHILS # BLD AUTO: 6.83 K/UL — SIGNIFICANT CHANGE UP (ref 1.8–7.4)
NEUTROPHILS NFR BLD AUTO: 72.8 % — SIGNIFICANT CHANGE UP (ref 43–77)
PHOSPHATE SERPL-MCNC: 3.8 MG/DL — SIGNIFICANT CHANGE UP (ref 2.5–4.5)
PLATELET # BLD AUTO: 260 K/UL — SIGNIFICANT CHANGE UP (ref 150–400)
POTASSIUM SERPL-MCNC: 4.3 MMOL/L — SIGNIFICANT CHANGE UP (ref 3.5–5.3)
POTASSIUM SERPL-SCNC: 4.3 MMOL/L — SIGNIFICANT CHANGE UP (ref 3.5–5.3)
PROTHROM AB SERPL-ACNC: 13.9 SEC — HIGH (ref 10.5–13.4)
RBC # BLD: 4.06 M/UL — SIGNIFICANT CHANGE UP (ref 3.8–5.2)
RBC # FLD: 13.5 % — SIGNIFICANT CHANGE UP (ref 10.3–14.5)
SODIUM SERPL-SCNC: 139 MMOL/L — SIGNIFICANT CHANGE UP (ref 135–145)
WBC # BLD: 9.39 K/UL — SIGNIFICANT CHANGE UP (ref 3.8–10.5)
WBC # FLD AUTO: 9.39 K/UL — SIGNIFICANT CHANGE UP (ref 3.8–10.5)

## 2022-10-17 PROCEDURE — 99232 SBSQ HOSP IP/OBS MODERATE 35: CPT

## 2022-10-17 RX ORDER — WARFARIN SODIUM 2.5 MG/1
5 TABLET ORAL DAILY
Refills: 0 | Status: DISCONTINUED | OUTPATIENT
Start: 2022-10-17 | End: 2022-10-18

## 2022-10-17 RX ORDER — CEFEPIME 1 G/1
2000 INJECTION, POWDER, FOR SOLUTION INTRAMUSCULAR; INTRAVENOUS EVERY 8 HOURS
Refills: 0 | Status: COMPLETED | OUTPATIENT
Start: 2022-10-17 | End: 2022-10-26

## 2022-10-17 RX ADMIN — GABAPENTIN 800 MILLIGRAM(S): 400 CAPSULE ORAL at 22:23

## 2022-10-17 RX ADMIN — WARFARIN SODIUM 5 MILLIGRAM(S): 2.5 TABLET ORAL at 22:23

## 2022-10-17 RX ADMIN — BUDESONIDE AND FORMOTEROL FUMARATE DIHYDRATE 2 PUFF(S): 160; 4.5 AEROSOL RESPIRATORY (INHALATION) at 21:16

## 2022-10-17 RX ADMIN — ALBUTEROL 2 PUFF(S): 90 AEROSOL, METERED ORAL at 21:18

## 2022-10-17 RX ADMIN — HYDROMORPHONE HYDROCHLORIDE 0.5 MILLIGRAM(S): 2 INJECTION INTRAMUSCULAR; INTRAVENOUS; SUBCUTANEOUS at 20:48

## 2022-10-17 RX ADMIN — HYDROMORPHONE HYDROCHLORIDE 0.5 MILLIGRAM(S): 2 INJECTION INTRAMUSCULAR; INTRAVENOUS; SUBCUTANEOUS at 21:30

## 2022-10-17 RX ADMIN — OXYCODONE HYDROCHLORIDE 5 MILLIGRAM(S): 5 TABLET ORAL at 12:18

## 2022-10-17 RX ADMIN — HYDROMORPHONE HYDROCHLORIDE 0.5 MILLIGRAM(S): 2 INJECTION INTRAMUSCULAR; INTRAVENOUS; SUBCUTANEOUS at 05:26

## 2022-10-17 RX ADMIN — GABAPENTIN 800 MILLIGRAM(S): 400 CAPSULE ORAL at 13:44

## 2022-10-17 RX ADMIN — Medication 10 MILLIGRAM(S): at 09:22

## 2022-10-17 RX ADMIN — SIMVASTATIN 10 MILLIGRAM(S): 20 TABLET, FILM COATED ORAL at 22:23

## 2022-10-17 RX ADMIN — MORPHINE SULFATE 15 MILLIGRAM(S): 50 CAPSULE, EXTENDED RELEASE ORAL at 09:24

## 2022-10-17 RX ADMIN — Medication 4: at 16:28

## 2022-10-17 RX ADMIN — HYDROMORPHONE HYDROCHLORIDE 0.5 MILLIGRAM(S): 2 INJECTION INTRAMUSCULAR; INTRAVENOUS; SUBCUTANEOUS at 16:32

## 2022-10-17 RX ADMIN — BUDESONIDE AND FORMOTEROL FUMARATE DIHYDRATE 2 PUFF(S): 160; 4.5 AEROSOL RESPIRATORY (INHALATION) at 10:09

## 2022-10-17 RX ADMIN — HYDROMORPHONE HYDROCHLORIDE 0.5 MILLIGRAM(S): 2 INJECTION INTRAMUSCULAR; INTRAVENOUS; SUBCUTANEOUS at 17:46

## 2022-10-17 RX ADMIN — CEFEPIME 100 MILLIGRAM(S): 1 INJECTION, POWDER, FOR SOLUTION INTRAMUSCULAR; INTRAVENOUS at 22:23

## 2022-10-17 RX ADMIN — Medication 2: at 12:15

## 2022-10-17 RX ADMIN — OXYCODONE HYDROCHLORIDE 5 MILLIGRAM(S): 5 TABLET ORAL at 09:21

## 2022-10-17 RX ADMIN — HYDROMORPHONE HYDROCHLORIDE 0.5 MILLIGRAM(S): 2 INJECTION INTRAMUSCULAR; INTRAVENOUS; SUBCUTANEOUS at 12:11

## 2022-10-17 RX ADMIN — CHOLESTYRAMINE 4 GRAM(S): 4 POWDER, FOR SUSPENSION ORAL at 09:22

## 2022-10-17 RX ADMIN — MORPHINE SULFATE 15 MILLIGRAM(S): 50 CAPSULE, EXTENDED RELEASE ORAL at 12:18

## 2022-10-17 RX ADMIN — OXYCODONE HYDROCHLORIDE 5 MILLIGRAM(S): 5 TABLET ORAL at 18:23

## 2022-10-17 RX ADMIN — Medication 25 MILLIGRAM(S): at 05:37

## 2022-10-17 RX ADMIN — Medication 25 MILLIGRAM(S): at 22:23

## 2022-10-17 RX ADMIN — GABAPENTIN 800 MILLIGRAM(S): 400 CAPSULE ORAL at 05:37

## 2022-10-17 RX ADMIN — CEFEPIME 100 MILLIGRAM(S): 1 INJECTION, POWDER, FOR SOLUTION INTRAMUSCULAR; INTRAVENOUS at 09:24

## 2022-10-17 RX ADMIN — HYDROMORPHONE HYDROCHLORIDE 0.5 MILLIGRAM(S): 2 INJECTION INTRAMUSCULAR; INTRAVENOUS; SUBCUTANEOUS at 12:18

## 2022-10-17 RX ADMIN — Medication 25 MILLIGRAM(S): at 13:45

## 2022-10-17 NOTE — PROGRESS NOTE ADULT - SUBJECTIVE AND OBJECTIVE BOX
71 y/o female pmhx Afib on coumadin, HFpEF, DM2, peripheral neuropathy, PVD, HTN, HLD, obesity presented to ER  w/ fevers and right lower extremity pain. Patient called her vascular surgeons ( Dr. Yoo), who instructed her to come to the ER. Patient states the pain in her RLE worsened over the past 2 days. she admits to increased fatigue and fevers. She denies any SOB, CP, abdominal pain, N/V. In ER meet sepsis criteria, given 30cc/kg IVF, broad spectrum abx and pan cultured. Seen by vascular surgery in the ER, ordered imaging of RLE. Post IVF patient remained hypotensive, started on phenylephrine. Admitted to ICU then subsequently transferred to Medicine floor for further management.   Patient was started on IV antibiotic for RLE cellulitis.     10/17- patient was seen and examined. Complaining of pain on the right lower extermities. OOB to chair. Denies shortness of breath or chest pain.     Vital Signs Last 24 Hrs  T(C): 37.1 (17 Oct 2022 08:00), Max: 37.1 (17 Oct 2022 08:00)  T(F): 98.8 (17 Oct 2022 08:00), Max: 98.8 (17 Oct 2022 08:00)  HR: 100 (17 Oct 2022 13:30) (84 - 113)  BP: 122/89 (17 Oct 2022 13:30) (112/68 - 146/84)  BP(mean): --  RR: 18 (17 Oct 2022 08:00) (18 - 20)  SpO2: 98% (17 Oct 2022 10:12) (94% - 98%)    Parameters below as of 17 Oct 2022 10:12  Patient On (Oxygen Delivery Method): room air    ROS:   All 10 systems reviewed and found to be negative with the exception of what has been described above.     PE:  Constitutional: NAD, laying in bed  HEENT: NC/AT  Back: no tenderness  Respiratory: respirations even and non labored, LCTA  Cardiovascular: S1S2 regular, no murmurs  Abdomen: soft, not tender, not distended, positive BS  Genitourinary: voiding  Rectal: deferred  Musculoskeletal: no muscle tenderness, no joint swelling or tenderness  Extremities: diffuse echymosis and RLE edema with pain. Decreased ROM, right inner ulcer/wound with scant serous drain. tender to palpation/ warm to touch- as per patient redness has improved.    Neurological: no focal deficits      MEDICATIONS  (STANDING):  budesonide 160 MICROgram(s)/formoterol 4.5 MICROgram(s) Inhaler 2 Puff(s) Inhalation two times a day  cefepime   IVPB 2000 milliGRAM(s) IV Intermittent every 12 hours  cholestyramine Powder (Sugar-Free) 4 Gram(s) Oral two times a day  dextrose 5%. 1000 milliLiter(s) (50 mL/Hr) IV Continuous <Continuous>  dextrose 5%. 1000 milliLiter(s) (100 mL/Hr) IV Continuous <Continuous>  dextrose 50% Injectable 25 Gram(s) IV Push once  dextrose 50% Injectable 12.5 Gram(s) IV Push once  dextrose 50% Injectable 25 Gram(s) IV Push once  gabapentin 800 milliGRAM(s) Oral three times a day  glucagon  Injectable 1 milliGRAM(s) IntraMuscular once  insulin lispro (ADMELOG) corrective regimen sliding scale   SubCutaneous Before meals and at bedtime  metoprolol tartrate 25 milliGRAM(s) Oral every 8 hours  morphine ER Tablet 15 milliGRAM(s) Oral every 12 hours  predniSONE   Tablet 10 milliGRAM(s) Oral daily  simvastatin 10 milliGRAM(s) Oral at bedtime    MEDICATIONS  (PRN):  acetaminophen     Tablet .. 650 milliGRAM(s) Oral every 6 hours PRN Temp greater or equal to 38C (100.4F)  ALBUTerol    90 MICROgram(s) HFA Inhaler 2 Puff(s) Inhalation every 6 hours PRN Shortness of Breath and/or Wheezing  dextrose Oral Gel 15 Gram(s) Oral once PRN Blood Glucose LESS THAN 70 milliGRAM(s)/deciliter  HYDROmorphone  Injectable 0.5 milliGRAM(s) IV Push every 4 hours PRN Moderate Pain (4 - 6)  oxyCODONE    IR 5 milliGRAM(s) Oral every 6 hours PRN Moderate Pain (4 - 6)  zolpidem 5 milliGRAM(s) Oral at bedtime PRN Insomnia    10-17    139  |  104  |  16  ----------------------------<  114<H>  4.3   |  30  |  0.56    Ca    8.9      17 Oct 2022 07:50  Phos  3.8     10-17  Mg     2.1     10-17                          12.8   9.39  )-----------( 260      ( 17 Oct 2022 07:50 )             40.6                 Culture - Blood (collected 12 Oct 2022 20:53)  Source: .Blood None  Gram Stain (14 Oct 2022 03:00):    Growth in aerobic bottle: Gram Negative Rods  Final Report (15 Oct 2022 11:22):    Growth in aerobic bottle: Pseudomonas putida group    ***Blood Panel PCR results on this specimen are available    approximately 3 hours after the Gram stain result.***    Gram stain, PCR, and/or culture results may not always    correspond due to difference in methodologies.    ************************************************************    This PCR assay was performed by multiplex PCR. This    Assay tests for 66 bacterial and resistance gene targets.    Please refer to the [facility] test directory    at [email or web address] for details.  Organism: Blood Culture PCR  Pseudomonas putida group (15 Oct 2022 11:22)  Organism: Pseudomonas putida group (15 Oct 2022 11:22)  Organism: Blood Culture PCR (15 Oct 2022 11:22)    Culture - Blood (collected 12 Oct 2022 20:26)  Source: .Blood None  Gram Stain (13 Oct 2022 22:37):    Growth in aerobic bottle: Gram Negative Rods  Final Report (15 Oct 2022 19:12):    Growth in aerobic bottle: Pseudomonas putida group    See previous culture [id number]    ***Blood Panel PCR results on this specimen are available    approximately 3 hours after the Gram stain result.***    Gram stain, PCR, and/or culture results may not always    correspond due to difference in methodologies.    ************************************************************    This PCR assay was performed by multiplex PCR. This    Assay tests for 66 bacterial and resistance gene targets.    Please refer to the [facility] test directory    at [email or web address] for details.    A1C with Estimated Average Glucose (10.13.22 @ 05:44)   A1C with Estimated Average Glucose Result: 5.8Urinalysis (10.13.22 @ 02:45)   pH Urine: 6.5   Glucose Qualitative, Urine: Negative   Blood, Urine: Trace   Color: Yellow   Urine Appearance: Clear   Bilirubin: Negative   Ketone - Urine: Negative   Specific Gravity: 1.005   Protein, Urine: Negative   Urobilinogen: Negative   Nitrite: Negative   Leukocyte Esterase Concentration: Negative Urine Microscopic-Add On (NC) (10.13.22 @ 02:45)   Red Blood Cell - Urine: 3-5 /HPF   White Blood Cell - Urine: 0-2   Bacteria: Negative   Epithelial Cells: Occasional Lactate, Blood (10.13.22 @ 00:46)   Lactate, Blood: 2.1 mmol/L   Lactate, Blood (10.12.22 @ 20:26)   Lactate, Blood: 3.2 mmol/L      RADIOLOGY:  < from: CT Lower Extremity w/ IV Cont, Right (10.12.22 @ 22:34) >  IMPRESSION:    Diffuse stranding and skin thickening in the right and visualized left   lower extremity soft tissue, which may represent edema and/or cellulitis.   No discrete organized fluid collection in the visualized soft tissue. No   obvious bone erosion or periosteal reaction. If there is continued   clinical suspicion for soft tissue/bone infection, additional imaging   (MRI and/or nuclear scan) may be obtained for further evaluation.    < end of copied text >  < from: US Duplex Venous Lower Ext Ltd, Right (10.12.22 @ 22:06) >  IMPRESSION:    The right soleal vein was not visualized. No obvious thrombus in the   visualized right lower extremity deep veins.    < end of copied text >  < from: Xray Chest 1 View- PORTABLE-Urgent (10.12.22 @ 20:15) >  Impression:    No acute pulmonary disease.    < end of copied text >      EKG:  < from: 12 Lead ECG (10.12.22 @ 23:27) >  Diagnosis Line Atrial fibrillation with rapid ventricular response  Left axis deviation  Possible Anterior infarct (cited on or before 12-OCT-2022)  Abnormal ECG    < end of copied text >      ECHO:  < from: TTE Echo Complete w/Doppler (05.14.19 @ 18:07) >  Summary:   1. Left ventricular ejection fraction, by visual estimation, is 50 to   55%.   2. Low normal global left ventricular systolic function.   3. The left ventricular diastolic function could not be assessed in this   study.   4. Moderate to severe left atrial enlargement.   5. Mildly enlarged right atrium.   6. Moderate mitral annular calcification.   7. Thickening and calcification of the anterior and posterior mitral   valve leaflets.   8. Mild mitral valve regurgitation.   9. Peak transaortic gradient equals 30.4 mmHg, mean transaortic gradient   equals 15.8 mmHg, the calculated aortic valve area equals 1.35 cm² by the   continuity equation consistent with moderate aortic stenosis.  10. Estimated pulmonary artery systolic pressure is 36.6 mmHg assuming a   right atrial pressure of 3 mmHg, which is consistent with borderline   pulmonary hypertension.  11. There is no evidence of pericardial effusion.  12. Endocardial visualization was enhanced with intravenous echo contrast.    < end of copied text >            I personally reviewed labs, imaging, ekg, orders and vitals.    Discussed case with:  [X]RN  [X]CM/IDALIA  [X]Patient  []Family  [X]Specialist:        MEDICATIONS  (STANDING):  budesonide 160 MICROgram(s)/formoterol 4.5 MICROgram(s) Inhaler 2 Puff(s) Inhalation two times a day  cefepime   IVPB 2000 milliGRAM(s) IV Intermittent every 12 hours  cholestyramine Powder (Sugar-Free) 4 Gram(s) Oral two times a day  dextrose 5%. 1000 milliLiter(s) (100 mL/Hr) IV Continuous <Continuous>  dextrose 5%. 1000 milliLiter(s) (50 mL/Hr) IV Continuous <Continuous>  dextrose 50% Injectable 25 Gram(s) IV Push once  dextrose 50% Injectable 12.5 Gram(s) IV Push once  dextrose 50% Injectable 25 Gram(s) IV Push once  gabapentin 800 milliGRAM(s) Oral three times a day  glucagon  Injectable 1 milliGRAM(s) IntraMuscular once  insulin lispro (ADMELOG) corrective regimen sliding scale   SubCutaneous Before meals and at bedtime  metoprolol tartrate 25 milliGRAM(s) Oral every 8 hours  predniSONE   Tablet 10 milliGRAM(s) Oral daily  simvastatin 10 milliGRAM(s) Oral at bedtime    MEDICATIONS  (PRN):  acetaminophen     Tablet .. 650 milliGRAM(s) Oral every 6 hours PRN Temp greater or equal to 38C (100.4F)  ALBUTerol    90 MICROgram(s) HFA Inhaler 2 Puff(s) Inhalation every 6 hours PRN Shortness of Breath and/or Wheezing  dextrose Oral Gel 15 Gram(s) Oral once PRN Blood Glucose LESS THAN 70 milliGRAM(s)/deciliter  HYDROmorphone  Injectable 0.5 milliGRAM(s) IV Push every 4 hours PRN Moderate Pain (4 - 6)  oxyCODONE    IR 5 milliGRAM(s) Oral every 6 hours PRN Moderate Pain (4 - 6)  zolpidem 5 milliGRAM(s) Oral at bedtime PRN Insomnia

## 2022-10-17 NOTE — PROGRESS NOTE ADULT - ASSESSMENT
73 y/o female with pmhx of afib on coumadin, HFpEF, peripheral neuroapthy, DM II, PVD, HTN, HLD admitted on 11/12 for RLE pain admitted with:      *Septic Shock due to Diffuse RLE Cellulitis/Soft Tissue infection with Associated Pseduomonas bacteremia.  - ID consult appreciated  - Antibiotics as per ID  - BLood cuture showed pseudomonas   - monitor temps  - monitor CBC  - received Vanc- now on cefepime  D#5  - f/u cultures  - tolerating antibiotics  - elevate legs  - pain regimen PRN   - wound care consult      *Chronic Atrial Fibrillation - on coumadin  - INR 2-3   - rate controlled  - metoprolol    *DM II  - ISS  - Diabetic diet     *PVD  - vascular consult       *HTN  *HLD  *Peripheral Neuropathy  On chronic Prednisone (unclear for with pathology)    Case d/w team on IDR.

## 2022-10-17 NOTE — PROGRESS NOTE ADULT - SUBJECTIVE AND OBJECTIVE BOX
Date of service: 10-17-22 @ 16:40    Lying in bed in NAD  Has left leg pain and tendernss  No fever    ROS: no fever or chills; denies dizziness, no HA, no SOB or cough, no abdominal pain, no diarrhea or constipation; no dysuria    MEDICATIONS  (STANDING):  budesonide 160 MICROgram(s)/formoterol 4.5 MICROgram(s) Inhaler 2 Puff(s) Inhalation two times a day  cefepime   IVPB 2000 milliGRAM(s) IV Intermittent every 12 hours  cholestyramine Powder (Sugar-Free) 4 Gram(s) Oral two times a day  dextrose 5%. 1000 milliLiter(s) (50 mL/Hr) IV Continuous <Continuous>  dextrose 5%. 1000 milliLiter(s) (100 mL/Hr) IV Continuous <Continuous>  dextrose 50% Injectable 25 Gram(s) IV Push once  dextrose 50% Injectable 12.5 Gram(s) IV Push once  dextrose 50% Injectable 25 Gram(s) IV Push once  gabapentin 800 milliGRAM(s) Oral three times a day  glucagon  Injectable 1 milliGRAM(s) IntraMuscular once  insulin lispro (ADMELOG) corrective regimen sliding scale   SubCutaneous Before meals and at bedtime  metoprolol tartrate 25 milliGRAM(s) Oral every 8 hours  morphine ER Tablet 15 milliGRAM(s) Oral every 12 hours  predniSONE   Tablet 10 milliGRAM(s) Oral daily  simvastatin 10 milliGRAM(s) Oral at bedtime  warfarin 5 milliGRAM(s) Oral daily    Vital Signs Last 24 Hrs  T(C): 37.1 (17 Oct 2022 08:00), Max: 37.1 (17 Oct 2022 08:00)  T(F): 98.8 (17 Oct 2022 08:00), Max: 98.8 (17 Oct 2022 08:00)  HR: 100 (17 Oct 2022 13:30) (84 - 113)  BP: 122/89 (17 Oct 2022 13:30) (112/68 - 146/84)  BP(mean): --  RR: 18 (17 Oct 2022 08:00) (18 - 20)  SpO2: 98% (17 Oct 2022 10:12) (94% - 98%)    Parameters below as of 17 Oct 2022 10:12  Patient On (Oxygen Delivery Method): room air     Physical exam:    Constitutional:  No acute distress  HEENT: NC/AT, EOMI, PERRLA, conjunctivae clear; ears and nose atraumatic  Neck: supple; thyroid not palpable  Back: no tenderness  Respiratory: respiratory effort normal; clear to auscultation  Cardiovascular: S1S2 regular, no murmurs  Abdomen: soft, not tender, not distended, positive BS  Genitourinary: no suprapubic tenderness  Lymphatic: no LN palpable  Musculoskeletal: no muscle tenderness, no joint swelling or tenderness  Extremities: no pedal edema  Right foot, ankle, shin, calf extensive erythema, edema, warmth; thin skin - improving slowly   2 medial aspect necrotic ulcer; scant serous discharge around ankle  Left lower leg thickened skin with chronic discoloration  Neurological/ Psychiatric: AxOx3, judgement and insight normal; moving all extremities  Skin: no rashes; no palpable lesions    Labs: reviewed                        12.8   9.39  )-----------( 260      ( 17 Oct 2022 07:50 )             40.6     10-17    139  |  104  |  16  ----------------------------<  114<H>  4.3   |  30  |  0.56    Ca    8.9      17 Oct 2022 07:50  Phos  3.8     10-17  Mg     2.1     10-17                        12.5   10.99 )-----------( 225      ( 15 Oct 2022 06:13 )             39.9     10-15    140  |  107  |  19  ----------------------------<  178<H>  3.7   |  28  |  0.58    Ca    8.6      15 Oct 2022 06:13  Phos  3.1     10-15  Mg     1.8     10-15    Vancomycin Level, Trough: 13.5 ug/mL (10-14 @ 08:45)               13.7   15.58 )-----------( 251      ( 13 Oct 2022 05:44 )             42.0     10-13    139  |  107  |  13  ----------------------------<  100<H>  4.0   |  26  |  0.73    Ca    8.6      13 Oct 2022 05:44  Phos  3.2     10-  Mg     2.4     10-    TPro  6.3  /  Alb  2.7<L>  /  TBili  1.2  /  DBili  x   /  AST  19  /  ALT  27  /  AlkPhos  46  10-     LIVER FUNCTIONS - ( 13 Oct 2022 05:44 )  Alb: 2.7 g/dL / Pro: 6.3 gm/dL / ALK PHOS: 46 U/L / ALT: 27 U/L / AST: 19 U/L / GGT: x           Urinalysis Basic - ( 13 Oct 2022 02:45 )    Color: Yellow / Appearance: Clear / S.005 / pH: x  Gluc: x / Ketone: Negative  / Bili: Negative / Urobili: Negative   Blood: x / Protein: Negative / Nitrite: Negative   Leuk Esterase: Negative / RBC: 3-5 /HPF / WBC 0-2   Sq Epi: x / Non Sq Epi: Occasional / Bacteria: Negative    (10-12 @ 20:26)  NotDetec      Culture - Blood (collected 16 Oct 2022 06:24)  Source: .Blood None  Preliminary Report (17 Oct 2022 12:02):    No growth to date.    Culture - Blood (collected 16 Oct 2022 06:24)  Source: .Blood None  Preliminary Report (17 Oct 2022 12:02):    No growth to date.    Culture - Urine (collected 13 Oct 2022 02:46)  Source: Clean Catch Clean Catch (Midstream)  Final Report (14 Oct 2022 13:50):    No growth    Culture - Blood (collected 12 Oct 2022 20:53)  Source: .Blood None  Gram Stain (14 Oct 2022 03:00):    Growth in aerobic bottle: Gram Negative Rods  Final Report (15 Oct 2022 11:22):    Growth in aerobic bottle: Pseudomonas putida group  Organism: Blood Culture PCR  Pseudomonas putida group (15 Oct 2022 11:22)  Organism: Pseudomonas putida group (15 Oct 2022 11:22)      -  Amikacin: S <=16      -  Aztreonam: R >16      -  Cefepime: S 4      -  Ceftriaxone: S <=1      -  Ciprofloxacin: R >2      -  Gentamicin: S <=2      -  Levofloxacin: R >4      -  Meropenem: S 4      -  Piperacillin/Tazobactam: S <=8      -  Tobramycin: S <=2      -  Trimethoprim/Sulfamethoxazole: R >2/38      Method Type: TOYA  Organism: Blood Culture PCR (15 Oct 2022 11:22)      -  Blood PCR Panel: NEG      Method Type: PCR    Culture - Blood (collected 12 Oct 2022 20:26)  Source: .Blood None  Gram Stain (13 Oct 2022 22:37):    Growth in aerobic bottle: Gram Negative Rods  Final Report (15 Oct 2022 19:12):    Growth in aerobic bottle: Pseudomonas putida group    Radiology: all available radiological tests reviewed    Advanced directives addressed: full resuscitation

## 2022-10-17 NOTE — PROGRESS NOTE ADULT - ASSESSMENT
71 y/o female h/o obesity, Afib on coumadin, HFpEF, DM2, peripheral neuropathy, PVD, HTN, HLD, chronic b/l legs venous insufficiency, UC was admitted on 10/12 for fever and right lower extremity redness, swelling and pain. Patient called her vascular surgeons ( Dr. Yoo) office, who instructed her to come to the ER. Patient states the pain in her RLE worsened over the past 2 days PTA. She admits to increased fatigue and fever. She denies any SOB, CP, abdominal pain, N/V. In ER she was noted hypotensive and met sepsis criteria, given 30cc/kg, started on phenylephrine and received clindamycin, vancomycin IV and cefepime.     1. Febrile syndrome. Sepsis with PSPU. Extensive RLE cellulitis. B/L LE chronic stasis dermatitis. Obesity. Allergy to PCN.  -concern about toxic shock syndrome raised  -leukocytosis   -BC x 2, urine c/s noted  -s/p vancomycin 1500 mg IV q12h  -on cefepime 2 gm IV q12h # 5  -tolerating abx well so far; no side effects noted  -pseudomonas [id number] reviewed  -change abx to zosyn 3.375 gm IV q8h  -continue abx coverage   -f/u cultures   -repeat BC x 2 are negative to date  -leukocytosis improving  -pain management  -elevate legs  -monitor temps  -f/u CBC  -supportive care  2. Other issues:   -care per medicine    d/w Dr. Delatorre 71 y/o female h/o obesity, Afib on coumadin, HFpEF, DM2, peripheral neuropathy, PVD, HTN, HLD, chronic b/l legs venous insufficiency, UC was admitted on 10/12 for fever and right lower extremity redness, swelling and pain. Patient called her vascular surgeons ( Dr. Yoo) office, who instructed her to come to the ER. Patient states the pain in her RLE worsened over the past 2 days PTA. She admits to increased fatigue and fever. She denies any SOB, CP, abdominal pain, N/V. In ER she was noted hypotensive and met sepsis criteria, given 30cc/kg, started on phenylephrine and received clindamycin, vancomycin IV and cefepime.     1. Febrile syndrome. Sepsis with PSPU. Extensive RLE cellulitis. B/L LE chronic stasis dermatitis. Obesity. Allergy to PCN.  -concern about toxic shock syndrome raised  -leukocytosis   -BC x 2, urine c/s noted  -s/p vancomycin 1500 mg IV q12h  -on cefepime 2 gm IV q12h # 5  -tolerating abx well so far; no side effects noted  -pseudomonas sensitivities reviewed  -unable to give zosyn due to PCN allergy; increase cefepime to 2 gm IV q8h  -continue abx coverage   -f/u cultures   -repeat BC x 2 are negative to date  -leukocytosis improving  -pain management  -elevate legs  -monitor temps  -f/u CBC  -supportive care  2. Other issues:   -care per medicine    d/w Dr. Delatorre

## 2022-10-18 LAB
ANION GAP SERPL CALC-SCNC: 6 MMOL/L — SIGNIFICANT CHANGE UP (ref 5–17)
APTT BLD: 29.9 SEC — SIGNIFICANT CHANGE UP (ref 27.5–35.5)
BUN SERPL-MCNC: 19 MG/DL — SIGNIFICANT CHANGE UP (ref 7–23)
CALCIUM SERPL-MCNC: 8.7 MG/DL — SIGNIFICANT CHANGE UP (ref 8.5–10.1)
CHLORIDE SERPL-SCNC: 103 MMOL/L — SIGNIFICANT CHANGE UP (ref 96–108)
CO2 SERPL-SCNC: 28 MMOL/L — SIGNIFICANT CHANGE UP (ref 22–31)
CREAT SERPL-MCNC: 0.58 MG/DL — SIGNIFICANT CHANGE UP (ref 0.5–1.3)
EGFR: 95 ML/MIN/1.73M2 — SIGNIFICANT CHANGE UP
GLUCOSE SERPL-MCNC: 119 MG/DL — HIGH (ref 70–99)
HCT VFR BLD CALC: 40.4 % — SIGNIFICANT CHANGE UP (ref 34.5–45)
HGB BLD-MCNC: 12.9 G/DL — SIGNIFICANT CHANGE UP (ref 11.5–15.5)
INR BLD: 1.24 RATIO — HIGH (ref 0.88–1.16)
MCHC RBC-ENTMCNC: 31.8 PG — SIGNIFICANT CHANGE UP (ref 27–34)
MCHC RBC-ENTMCNC: 31.9 GM/DL — LOW (ref 32–36)
MCV RBC AUTO: 99.5 FL — SIGNIFICANT CHANGE UP (ref 80–100)
PLATELET # BLD AUTO: 286 K/UL — SIGNIFICANT CHANGE UP (ref 150–400)
POTASSIUM SERPL-MCNC: 4.2 MMOL/L — SIGNIFICANT CHANGE UP (ref 3.5–5.3)
POTASSIUM SERPL-SCNC: 4.2 MMOL/L — SIGNIFICANT CHANGE UP (ref 3.5–5.3)
PROTHROM AB SERPL-ACNC: 14.4 SEC — HIGH (ref 10.5–13.4)
RBC # BLD: 4.06 M/UL — SIGNIFICANT CHANGE UP (ref 3.8–5.2)
RBC # FLD: 13.6 % — SIGNIFICANT CHANGE UP (ref 10.3–14.5)
SODIUM SERPL-SCNC: 137 MMOL/L — SIGNIFICANT CHANGE UP (ref 135–145)
WBC # BLD: 10.33 K/UL — SIGNIFICANT CHANGE UP (ref 3.8–10.5)
WBC # FLD AUTO: 10.33 K/UL — SIGNIFICANT CHANGE UP (ref 3.8–10.5)

## 2022-10-18 PROCEDURE — 99232 SBSQ HOSP IP/OBS MODERATE 35: CPT

## 2022-10-18 RX ORDER — WARFARIN SODIUM 2.5 MG/1
7.5 TABLET ORAL DAILY
Refills: 0 | Status: DISCONTINUED | OUTPATIENT
Start: 2022-10-18 | End: 2022-10-21

## 2022-10-18 RX ADMIN — Medication 2: at 22:56

## 2022-10-18 RX ADMIN — HYDROMORPHONE HYDROCHLORIDE 0.5 MILLIGRAM(S): 2 INJECTION INTRAMUSCULAR; INTRAVENOUS; SUBCUTANEOUS at 20:52

## 2022-10-18 RX ADMIN — HYDROMORPHONE HYDROCHLORIDE 0.5 MILLIGRAM(S): 2 INJECTION INTRAMUSCULAR; INTRAVENOUS; SUBCUTANEOUS at 03:30

## 2022-10-18 RX ADMIN — SIMVASTATIN 10 MILLIGRAM(S): 20 TABLET, FILM COATED ORAL at 22:55

## 2022-10-18 RX ADMIN — HYDROMORPHONE HYDROCHLORIDE 0.5 MILLIGRAM(S): 2 INJECTION INTRAMUSCULAR; INTRAVENOUS; SUBCUTANEOUS at 21:22

## 2022-10-18 RX ADMIN — HYDROMORPHONE HYDROCHLORIDE 0.5 MILLIGRAM(S): 2 INJECTION INTRAMUSCULAR; INTRAVENOUS; SUBCUTANEOUS at 02:41

## 2022-10-18 RX ADMIN — Medication 25 MILLIGRAM(S): at 15:19

## 2022-10-18 RX ADMIN — WARFARIN SODIUM 7.5 MILLIGRAM(S): 2.5 TABLET ORAL at 22:56

## 2022-10-18 RX ADMIN — CHOLESTYRAMINE 4 GRAM(S): 4 POWDER, FOR SUSPENSION ORAL at 10:50

## 2022-10-18 RX ADMIN — HYDROMORPHONE HYDROCHLORIDE 0.5 MILLIGRAM(S): 2 INJECTION INTRAMUSCULAR; INTRAVENOUS; SUBCUTANEOUS at 15:20

## 2022-10-18 RX ADMIN — BUDESONIDE AND FORMOTEROL FUMARATE DIHYDRATE 2 PUFF(S): 160; 4.5 AEROSOL RESPIRATORY (INHALATION) at 08:52

## 2022-10-18 RX ADMIN — MORPHINE SULFATE 15 MILLIGRAM(S): 50 CAPSULE, EXTENDED RELEASE ORAL at 23:20

## 2022-10-18 RX ADMIN — CEFEPIME 100 MILLIGRAM(S): 1 INJECTION, POWDER, FOR SOLUTION INTRAMUSCULAR; INTRAVENOUS at 15:19

## 2022-10-18 RX ADMIN — BUDESONIDE AND FORMOTEROL FUMARATE DIHYDRATE 2 PUFF(S): 160; 4.5 AEROSOL RESPIRATORY (INHALATION) at 21:08

## 2022-10-18 RX ADMIN — Medication 25 MILLIGRAM(S): at 22:55

## 2022-10-18 RX ADMIN — HYDROMORPHONE HYDROCHLORIDE 0.5 MILLIGRAM(S): 2 INJECTION INTRAMUSCULAR; INTRAVENOUS; SUBCUTANEOUS at 10:49

## 2022-10-18 RX ADMIN — CEFEPIME 100 MILLIGRAM(S): 1 INJECTION, POWDER, FOR SOLUTION INTRAMUSCULAR; INTRAVENOUS at 06:25

## 2022-10-18 RX ADMIN — Medication 25 MILLIGRAM(S): at 06:25

## 2022-10-18 RX ADMIN — GABAPENTIN 800 MILLIGRAM(S): 400 CAPSULE ORAL at 15:20

## 2022-10-18 RX ADMIN — GABAPENTIN 800 MILLIGRAM(S): 400 CAPSULE ORAL at 22:55

## 2022-10-18 RX ADMIN — Medication 10 MILLIGRAM(S): at 10:50

## 2022-10-18 RX ADMIN — CEFEPIME 100 MILLIGRAM(S): 1 INJECTION, POWDER, FOR SOLUTION INTRAMUSCULAR; INTRAVENOUS at 22:55

## 2022-10-18 RX ADMIN — GABAPENTIN 800 MILLIGRAM(S): 400 CAPSULE ORAL at 06:25

## 2022-10-18 NOTE — PROGRESS NOTE ADULT - ASSESSMENT
71 y/o female h/o obesity, Afib on coumadin, HFpEF, DM2, peripheral neuropathy, PVD, HTN, HLD, chronic b/l legs venous insufficiency, UC was admitted on 10/12 for fever and right lower extremity redness, swelling and pain. Patient called her vascular surgeons ( Dr. Yoo) office, who instructed her to come to the ER. Patient states the pain in her RLE worsened over the past 2 days PTA. She admits to increased fatigue and fever. She denies any SOB, CP, abdominal pain, N/V. In ER she was noted hypotensive and met sepsis criteria, given 30cc/kg, started on phenylephrine and received clindamycin, vancomycin IV and cefepime.     1. Febrile syndrome. Sepsis with PSPU. Extensive RLE cellulitis. B/L LE chronic stasis dermatitis. Obesity. Allergy to PCN.  -concern about toxic shock syndrome raised  -leukocytosis   -BC x 2, urine c/s noted  -s/p vancomycin 1500 mg IV q12h  -on cefepime 2 gm IV q8h # 6  -tolerating abx well so far; no side effects noted  -pseudomonas isolated is poorly sensitive to abx  -continue abx coverage   -f/u cultures   -repeat BC x 2 are negative to date  -leukocytosis improving  -pain management  -elevate legs  -monitor temps  -f/u CBC  -supportive care  2. Other issues:   -care per medicine    d/w Dr. Delatorre

## 2022-10-18 NOTE — PROGRESS NOTE ADULT - SUBJECTIVE AND OBJECTIVE BOX
Date of service: 10-18-22 @ 07:50    Lying in bed in NAD  Right lower leg feels tender  Has swelling  Has low grade fever    ROS: no fever or chills; denies dizziness, no HA, no SOB or cough, no abdominal pain, no diarrhea or constipation; no dysuria    MEDICATIONS  (STANDING):  budesonide 160 MICROgram(s)/formoterol 4.5 MICROgram(s) Inhaler 2 Puff(s) Inhalation two times a day  cefepime   IVPB 2000 milliGRAM(s) IV Intermittent every 8 hours  cholestyramine Powder (Sugar-Free) 4 Gram(s) Oral two times a day  dextrose 5%. 1000 milliLiter(s) (100 mL/Hr) IV Continuous <Continuous>  dextrose 5%. 1000 milliLiter(s) (50 mL/Hr) IV Continuous <Continuous>  dextrose 50% Injectable 25 Gram(s) IV Push once  dextrose 50% Injectable 12.5 Gram(s) IV Push once  dextrose 50% Injectable 25 Gram(s) IV Push once  gabapentin 800 milliGRAM(s) Oral three times a day  glucagon  Injectable 1 milliGRAM(s) IntraMuscular once  insulin lispro (ADMELOG) corrective regimen sliding scale   SubCutaneous Before meals and at bedtime  metoprolol tartrate 25 milliGRAM(s) Oral every 8 hours  morphine ER Tablet 15 milliGRAM(s) Oral every 12 hours  predniSONE   Tablet 10 milliGRAM(s) Oral daily  simvastatin 10 milliGRAM(s) Oral at bedtime  warfarin 5 milliGRAM(s) Oral daily    Vital Signs Last 24 Hrs  T(C): 37.2 (18 Oct 2022 01:55), Max: 37.5 (17 Oct 2022 20:39)  T(F): 99 (18 Oct 2022 01:55), Max: 99.5 (17 Oct 2022 20:39)  HR: 89 (18 Oct 2022 06:15) (84 - 119)  BP: 129/90 (18 Oct 2022 06:15) (119/69 - 141/96)  BP(mean): --  RR: 20 (18 Oct 2022 01:55) (18 - 20)  SpO2: 94% (18 Oct 2022 01:55) (93% - 98%)    Parameters below as of 18 Oct 2022 01:55  Patient On (Oxygen Delivery Method): room air         Physical exam:    Constitutional:  No acute distress  HEENT: NC/AT, EOMI, PERRLA, conjunctivae clear; ears and nose atraumatic  Neck: supple; thyroid not palpable  Back: no tenderness  Respiratory: respiratory effort normal; clear to auscultation  Cardiovascular: S1S2 regular, no murmurs  Abdomen: soft, not tender, not distended, positive BS  Genitourinary: no suprapubic tenderness  Lymphatic: no LN palpable  Musculoskeletal: no muscle tenderness, no joint swelling or tenderness  Extremities: no pedal edema  Right foot, ankle, shin, calf extensive erythema, edema, warmth; thin skin - improving slowly   2 medial aspect necrotic ulcer; scant serous discharge around ankle  Left lower leg thickened skin with chronic discoloration  Neurological/ Psychiatric: AxOx3, judgement and insight normal; moving all extremities  Skin: no rashes; no palpable lesions    Labs: reviewed                        12.8   9.39  )-----------( 260      ( 17 Oct 2022 07:50 )             40.6     10-17    139  |  104  |  16  ----------------------------<  114<H>  4.3   |  30  |  0.56    Ca    8.9      17 Oct 2022 07:50  Phos  3.8     10-17  Mg     2.1     10-17                        12.5   10.99 )-----------( 225      ( 15 Oct 2022 06:13 )             39.9     10-15    140  |  107  |  19  ----------------------------<  178<H>  3.7   |  28  |  0.58    Ca    8.6      15 Oct 2022 06:13  Phos  3.1     10-15  Mg     1.8     10-15    Vancomycin Level, Trough: 13.5 ug/mL (10-14 @ 08:45)               13.7   15.58 )-----------( 251      ( 13 Oct 2022 05:44 )             42.0     10-13    139  |  107  |  13  ----------------------------<  100<H>  4.0   |  26  |  0.73    Ca    8.6      13 Oct 2022 05:44  Phos  3.2     10-  Mg     2.4     10-    TPro  6.3  /  Alb  2.7<L>  /  TBili  1.2  /  DBili  x   /  AST  19  /  ALT  27  /  AlkPhos  46  10-     LIVER FUNCTIONS - ( 13 Oct 2022 05:44 )  Alb: 2.7 g/dL / Pro: 6.3 gm/dL / ALK PHOS: 46 U/L / ALT: 27 U/L / AST: 19 U/L / GGT: x           Urinalysis Basic - ( 13 Oct 2022 02:45 )    Color: Yellow / Appearance: Clear / S.005 / pH: x  Gluc: x / Ketone: Negative  / Bili: Negative / Urobili: Negative   Blood: x / Protein: Negative / Nitrite: Negative   Leuk Esterase: Negative / RBC: 3-5 /HPF / WBC 0-2   Sq Epi: x / Non Sq Epi: Occasional / Bacteria: Negative    (10-12 @ 20:26)  NotDetec      Culture - Blood (collected 16 Oct 2022 06:24)  Source: .Blood None  Preliminary Report (17 Oct 2022 12:02):    No growth to date.    Culture - Blood (collected 16 Oct 2022 06:24)  Source: .Blood None  Preliminary Report (17 Oct 2022 12:02):    No growth to date.    Culture - Urine (collected 13 Oct 2022 02:46)  Source: Clean Catch Clean Catch (Midstream)  Final Report (14 Oct 2022 13:50):    No growth    Culture - Blood (collected 12 Oct 2022 20:53)  Source: .Blood None  Gram Stain (14 Oct 2022 03:00):    Growth in aerobic bottle: Gram Negative Rods  Final Report (15 Oct 2022 11:22):    Growth in aerobic bottle: Pseudomonas putida group  Organism: Blood Culture PCR  Pseudomonas putida group (15 Oct 2022 11:22)  Organism: Pseudomonas putida group (15 Oct 2022 11:22)      -  Amikacin: S <=16      -  Aztreonam: R >16      -  Cefepime: S 4      -  Ceftriaxone: S <=1      -  Ciprofloxacin: R >2      -  Gentamicin: S <=2      -  Levofloxacin: R >4      -  Meropenem: S 4      -  Piperacillin/Tazobactam: S <=8      -  Tobramycin: S <=2      -  Trimethoprim/Sulfamethoxazole: R >2/38      Method Type: TOYA  Organism: Blood Culture PCR (15 Oct 2022 11:22)      -  Blood PCR Panel: NEG      Method Type: PCR    Culture - Blood (collected 12 Oct 2022 20:26)  Source: .Blood None  Gram Stain (13 Oct 2022 22:37):    Growth in aerobic bottle: Gram Negative Rods  Final Report (15 Oct 2022 19:12):    Growth in aerobic bottle: Pseudomonas putida group    Radiology: all available radiological tests reviewed    Advanced directives addressed: full resuscitation

## 2022-10-18 NOTE — PROGRESS NOTE ADULT - ASSESSMENT
73 y/o female with pmhx of afib on coumadin, HFpEF, peripheral neuroapthy, DM II, PVD, HTN, HLD admitted on 11/12 for RLE pain admitted with:      *Septic Shock due to Diffuse RLE Cellulitis/Soft Tissue infection with Associated Pseduomonas bacteremia.  - ID consult appreciated  - Antibiotics as per ID  - BLood cuture showed pseudomonas   - monitor temps  - monitor CBC  - received Vanc- now on cefepime D#6     - f/u cultures- pseudomonas   - tolerating antibiotics  - elevate legs  - pain regimen PRN  - wound care consult    - leukocytosis - resolved     *Chronic Atrial Fibrillation - on coumadin  - INR 2-3   - rate controlled  - metoprolol    *DM II  - ISS  - Diabetic diet     *PVD  - vascular consult       *HTN  *HLD  *Peripheral Neuropathy  On chronic Prednisone (unclear for with pathology)    Case d/w team on IDR.          73 y/o female with pmhx of afib on coumadin, HFpEF, peripheral neuroapthy, DM II, PVD, HTN, HLD admitted on 11/12 for RLE pain admitted with:      *Septic Shock due to Diffuse RLE Cellulitis/Soft Tissue infection with Associated Pseduomonas bacteremia.  - ID consult appreciated  - Antibiotics as per ID  - BLood cuture showed pseudomonas   - monitor temps  - monitor CBC  - received Vanc- now on cefepime D#7    - f/u cultures- pseudomonas   - tolerating antibiotics  - elevate legs  - pain regimen PRN  - wound care consult    - leukocytosis - resolved     *Chronic Atrial Fibrillation - on coumadin  - INR 2-3   - rate controlled  - metoprolol    *DM II  - ISS  - Diabetic diet     *PVD  - vascular consult     *HTN  *HLD  *Peripheral Neuropathy  On chronic Prednisone (unclear for with pathology)    Case d/w team on IDR.

## 2022-10-18 NOTE — PHARMACOTHERAPY INTERVENTION NOTE - COMMENTS
Medication history complete, reviewed medication with patient and confirmed with DrFirst.
Modified penicillin allergy to state patient tolerated cefepime during this admission.

## 2022-10-18 NOTE — PROGRESS NOTE ADULT - SUBJECTIVE AND OBJECTIVE BOX
73 y/o female pmhx Afib on coumadin, HFpEF, DM2, peripheral neuropathy, PVD, HTN, HLD, obesity presented to ER  w/ fevers and right lower extremity pain. Patient called her vascular surgeons ( Dr. Yoo), who instructed her to come to the ER. Patient states the pain in her RLE worsened over the past 2 days. she admits to increased fatigue and fevers. She denies any SOB, CP, abdominal pain, N/V. In ER meet sepsis criteria, given 30cc/kg IVF, broad spectrum abx and pan cultured. Seen by vascular surgery in the ER, ordered imaging of RLE. Post IVF patient remained hypotensive, started on phenylephrine. Admitted to ICU then subsequently transferred to Medicine floor for further management.   Patient was started on IV antibiotic for RLE cellulitis.     10/17- patient was seen and examined. Complaining of pain on the right lower extermities. OOB to chair. Denies shortness of breath or chest pain.   10/18-    Vital Signs Last 24 Hrs  T(C): 37.2 (18 Oct 2022 08:00), Max: 37.5 (17 Oct 2022 20:39)  T(F): 98.9 (18 Oct 2022 08:00), Max: 99.5 (17 Oct 2022 20:39)  HR: 85 (18 Oct 2022 08:00) (84 - 119)  BP: 126/79 (18 Oct 2022 08:00) (119/69 - 138/95)  BP(mean): --  RR: 19 (18 Oct 2022 08:00) (18 - 20)  SpO2: 95% (18 Oct 2022 08:00) (93% - 98%)    Parameters below as of 18 Oct 2022 08:00  Patient On (Oxygen Delivery Method): room air        ROS:   All 10 systems reviewed and found to be negative with the exception of what has been described above.     PE:  Constitutional: NAD, laying in bed  HEENT: NC/AT  Back: no tenderness  Respiratory: respirations even and non labored, LCTA  Cardiovascular: S1S2 regular, no murmurs  Abdomen: soft, not tender, not distended, positive BS  Genitourinary: voiding  Rectal: deferred  Musculoskeletal: no muscle tenderness, no joint swelling or tenderness  Extremities: diffuse echymosis and RLE edema with pain. Decreased ROM, right inner ulcer/wound with scant serous drain. tender to palpation/ warm to touch- as per patient redness has improved.    Neurological: no focal deficits      MEDICATIONS  (STANDING):  budesonide 160 MICROgram(s)/formoterol 4.5 MICROgram(s) Inhaler 2 Puff(s) Inhalation two times a day  cefepime   IVPB 2000 milliGRAM(s) IV Intermittent every 8 hours  cholestyramine Powder (Sugar-Free) 4 Gram(s) Oral two times a day  dextrose 5%. 1000 milliLiter(s) (100 mL/Hr) IV Continuous <Continuous>  dextrose 5%. 1000 milliLiter(s) (50 mL/Hr) IV Continuous <Continuous>  dextrose 50% Injectable 25 Gram(s) IV Push once  dextrose 50% Injectable 12.5 Gram(s) IV Push once  dextrose 50% Injectable 25 Gram(s) IV Push once  gabapentin 800 milliGRAM(s) Oral three times a day  glucagon  Injectable 1 milliGRAM(s) IntraMuscular once  insulin lispro (ADMELOG) corrective regimen sliding scale   SubCutaneous Before meals and at bedtime  metoprolol tartrate 25 milliGRAM(s) Oral every 8 hours  morphine ER Tablet 15 milliGRAM(s) Oral every 12 hours  predniSONE   Tablet 10 milliGRAM(s) Oral daily  simvastatin 10 milliGRAM(s) Oral at bedtime  warfarin 5 milliGRAM(s) Oral daily    MEDICATIONS  (PRN):  acetaminophen     Tablet .. 650 milliGRAM(s) Oral every 6 hours PRN Temp greater or equal to 38C (100.4F)  ALBUTerol    90 MICROgram(s) HFA Inhaler 2 Puff(s) Inhalation every 6 hours PRN Shortness of Breath and/or Wheezing  dextrose Oral Gel 15 Gram(s) Oral once PRN Blood Glucose LESS THAN 70 milliGRAM(s)/deciliter  HYDROmorphone  Injectable 0.5 milliGRAM(s) IV Push every 4 hours PRN Moderate Pain (4 - 6)  oxyCODONE    IR 5 milliGRAM(s) Oral every 6 hours PRN Moderate Pain (4 - 6)  zolpidem 5 milliGRAM(s) Oral at bedtime PRN Insomnia            10-17    139  |  104  |  16  ----------------------------<  114<H>  4.3   |  30  |  0.56    Ca    8.9      17 Oct 2022 07:50  Phos  3.8     10-17  Mg     2.1     10-17                          12.8   9.39  )-----------( 260      ( 17 Oct 2022 07:50 )             40.6                 Culture - Blood (collected 12 Oct 2022 20:53)  Source: .Blood None  Gram Stain (14 Oct 2022 03:00):    Growth in aerobic bottle: Gram Negative Rods  Final Report (15 Oct 2022 11:22):    Growth in aerobic bottle: Pseudomonas putida group    ***Blood Panel PCR results on this specimen are available    approximately 3 hours after the Gram stain result.***    Gram stain, PCR, and/or culture results may not always    correspond due to difference in methodologies.    ************************************************************    This PCR assay was performed by multiplex PCR. This    Assay tests for 66 bacterial and resistance gene targets.    Please refer to the [facility] test directory    at [email or web address] for details.  Organism: Blood Culture PCR  Pseudomonas putida group (15 Oct 2022 11:22)  Organism: Pseudomonas putida group (15 Oct 2022 11:22)  Organism: Blood Culture PCR (15 Oct 2022 11:22)    Culture - Blood (collected 12 Oct 2022 20:26)  Source: .Blood None  Gram Stain (13 Oct 2022 22:37):    Growth in aerobic bottle: Gram Negative Rods  Final Report (15 Oct 2022 19:12):    Growth in aerobic bottle: Pseudomonas putida group    See previous culture [id number]    ***Blood Panel PCR results on this specimen are available    approximately 3 hours after the Gram stain result.***    Gram stain, PCR, and/or culture results may not always    correspond due to difference in methodologies.    ************************************************************    This PCR assay was performed by multiplex PCR. This    Assay tests for 66 bacterial and resistance gene targets.    Please refer to the [facility] test directory    at [email or web address] for details.    A1C with Estimated Average Glucose (10.13.22 @ 05:44)   A1C with Estimated Average Glucose Result: 5.8Urinalysis (10.13.22 @ 02:45)   pH Urine: 6.5   Glucose Qualitative, Urine: Negative   Blood, Urine: Trace   Color: Yellow   Urine Appearance: Clear   Bilirubin: Negative   Ketone - Urine: Negative   Specific Gravity: 1.005   Protein, Urine: Negative   Urobilinogen: Negative   Nitrite: Negative   Leukocyte Esterase Concentration: Negative Urine Microscopic-Add On (NC) (10.13.22 @ 02:45)   Red Blood Cell - Urine: 3-5 /HPF   White Blood Cell - Urine: 0-2   Bacteria: Negative   Epithelial Cells: Occasional Lactate, Blood (10.13.22 @ 00:46)   Lactate, Blood: 2.1 mmol/L   Lactate, Blood (10.12.22 @ 20:26)   Lactate, Blood: 3.2 mmol/L      RADIOLOGY:  < from: CT Lower Extremity w/ IV Cont, Right (10.12.22 @ 22:34) >  IMPRESSION:    Diffuse stranding and skin thickening in the right and visualized left   lower extremity soft tissue, which may represent edema and/or cellulitis.   No discrete organized fluid collection in the visualized soft tissue. No   obvious bone erosion or periosteal reaction. If there is continued   clinical suspicion for soft tissue/bone infection, additional imaging   (MRI and/or nuclear scan) may be obtained for further evaluation.    < end of copied text >  < from: US Duplex Venous Lower Ext Ltd, Right (10.12.22 @ 22:06) >  IMPRESSION:    The right soleal vein was not visualized. No obvious thrombus in the   visualized right lower extremity deep veins.    < end of copied text >  < from: Xray Chest 1 View- PORTABLE-Urgent (10.12.22 @ 20:15) >  Impression:    No acute pulmonary disease.    < end of copied text >      EKG:  < from: 12 Lead ECG (10.12.22 @ 23:27) >  Diagnosis Line Atrial fibrillation with rapid ventricular response  Left axis deviation  Possible Anterior infarct (cited on or before 12-OCT-2022)  Abnormal ECG    < end of copied text >      ECHO:  < from: TTE Echo Complete w/Doppler (05.14.19 @ 18:07) >  Summary:   1. Left ventricular ejection fraction, by visual estimation, is 50 to   55%.   2. Low normal global left ventricular systolic function.   3. The left ventricular diastolic function could not be assessed in this   study.   4. Moderate to severe left atrial enlargement.   5. Mildly enlarged right atrium.   6. Moderate mitral annular calcification.   7. Thickening and calcification of the anterior and posterior mitral   valve leaflets.   8. Mild mitral valve regurgitation.   9. Peak transaortic gradient equals 30.4 mmHg, mean transaortic gradient   equals 15.8 mmHg, the calculated aortic valve area equals 1.35 cm² by the   continuity equation consistent with moderate aortic stenosis.  10. Estimated pulmonary artery systolic pressure is 36.6 mmHg assuming a   right atrial pressure of 3 mmHg, which is consistent with borderline   pulmonary hypertension.  11. There is no evidence of pericardial effusion.  12. Endocardial visualization was enhanced with intravenous echo contrast.    < end of copied text >            I personally reviewed labs, imaging, ekg, orders and vitals.    Discussed case with:  [X]RN  [X]CM/IDALIA  [X]Patient  []Family  [X]Specialist:        MEDICATIONS  (STANDING):  budesonide 160 MICROgram(s)/formoterol 4.5 MICROgram(s) Inhaler 2 Puff(s) Inhalation two times a day  cefepime   IVPB 2000 milliGRAM(s) IV Intermittent every 12 hours  cholestyramine Powder (Sugar-Free) 4 Gram(s) Oral two times a day  dextrose 5%. 1000 milliLiter(s) (100 mL/Hr) IV Continuous <Continuous>  dextrose 5%. 1000 milliLiter(s) (50 mL/Hr) IV Continuous <Continuous>  dextrose 50% Injectable 25 Gram(s) IV Push once  dextrose 50% Injectable 12.5 Gram(s) IV Push once  dextrose 50% Injectable 25 Gram(s) IV Push once  gabapentin 800 milliGRAM(s) Oral three times a day  glucagon  Injectable 1 milliGRAM(s) IntraMuscular once  insulin lispro (ADMELOG) corrective regimen sliding scale   SubCutaneous Before meals and at bedtime  metoprolol tartrate 25 milliGRAM(s) Oral every 8 hours  predniSONE   Tablet 10 milliGRAM(s) Oral daily  simvastatin 10 milliGRAM(s) Oral at bedtime    MEDICATIONS  (PRN):  acetaminophen     Tablet .. 650 milliGRAM(s) Oral every 6 hours PRN Temp greater or equal to 38C (100.4F)  ALBUTerol    90 MICROgram(s) HFA Inhaler 2 Puff(s) Inhalation every 6 hours PRN Shortness of Breath and/or Wheezing  dextrose Oral Gel 15 Gram(s) Oral once PRN Blood Glucose LESS THAN 70 milliGRAM(s)/deciliter  HYDROmorphone  Injectable 0.5 milliGRAM(s) IV Push every 4 hours PRN Moderate Pain (4 - 6)  oxyCODONE    IR 5 milliGRAM(s) Oral every 6 hours PRN Moderate Pain (4 - 6)  zolpidem 5 milliGRAM(s) Oral at bedtime PRN Insomnia       73 y/o female pmhx Afib on coumadin, HFpEF, DM2, peripheral neuropathy, PVD, HTN, HLD, obesity presented to ER  w/ fevers and right lower extremity pain. Patient called her vascular surgeons ( Dr. Yoo), who instructed her to come to the ER. Patient states the pain in her RLE worsened over the past 2 days. she admits to increased fatigue and fevers. She denies any SOB, CP, abdominal pain, N/V. In ER meet sepsis criteria, given 30cc/kg IVF, broad spectrum abx and pan cultured. Seen by vascular surgery in the ER, ordered imaging of RLE. Post IVF patient remained hypotensive, started on phenylephrine. Admitted to ICU then subsequently transferred to Medicine floor for further management.   Patient was started on IV antibiotic for RLE cellulitis.     10/17- patient was seen and examined. Complaining of pain on the right lower extermities. OOB to chair. Denies shortness of breath or chest pain.   10/18- OOB to chair- complaining of burning on the RLE. Denies shortness of breath or chest pain.     Vital Signs Last 24 Hrs  T(C): 37.2 (18 Oct 2022 08:00), Max: 37.5 (17 Oct 2022 20:39)  T(F): 98.9 (18 Oct 2022 08:00), Max: 99.5 (17 Oct 2022 20:39)  HR: 85 (18 Oct 2022 08:00) (85 - 119)  BP: 126/79 (18 Oct 2022 08:00) (119/69 - 138/95)  BP(mean): --  RR: 19 (18 Oct 2022 08:00) (18 - 20)  SpO2: 95% (18 Oct 2022 08:00) (93% - 95%)    Parameters below as of 18 Oct 2022 08:00  Patient On (Oxygen Delivery Method): room air        ROS:   All 10 systems reviewed and found to be negative with the exception of what has been described above.     PE:  Constitutional: NAD, laying in bed  HEENT: NC/AT  Back: no tenderness  Respiratory: respirations even and non labored, LCTA  Cardiovascular: S1S2 regular, no murmurs  Abdomen: soft, not tender, not distended, positive BS  Genitourinary: voiding  Rectal: deferred  Musculoskeletal: no muscle tenderness, no joint swelling or tenderness  Extremities: diffuse echymosis and RLE edema with pain. Decreased ROM, right inner ulcer/wound with scant serous drain. tender to palpation/ warm to touch- as per patient redness has improved.    Neurological: no focal deficits      MEDICATIONS  (STANDING):  budesonide 160 MICROgram(s)/formoterol 4.5 MICROgram(s) Inhaler 2 Puff(s) Inhalation two times a day  cefepime   IVPB 2000 milliGRAM(s) IV Intermittent every 8 hours  cholestyramine Powder (Sugar-Free) 4 Gram(s) Oral two times a day  dextrose 5%. 1000 milliLiter(s) (100 mL/Hr) IV Continuous <Continuous>  dextrose 5%. 1000 milliLiter(s) (50 mL/Hr) IV Continuous <Continuous>  dextrose 50% Injectable 25 Gram(s) IV Push once  dextrose 50% Injectable 12.5 Gram(s) IV Push once  dextrose 50% Injectable 25 Gram(s) IV Push once  gabapentin 800 milliGRAM(s) Oral three times a day  glucagon  Injectable 1 milliGRAM(s) IntraMuscular once  insulin lispro (ADMELOG) corrective regimen sliding scale   SubCutaneous Before meals and at bedtime  metoprolol tartrate 25 milliGRAM(s) Oral every 8 hours  morphine ER Tablet 15 milliGRAM(s) Oral every 12 hours  predniSONE   Tablet 10 milliGRAM(s) Oral daily  simvastatin 10 milliGRAM(s) Oral at bedtime  warfarin 5 milliGRAM(s) Oral daily    MEDICATIONS  (PRN):  acetaminophen     Tablet .. 650 milliGRAM(s) Oral every 6 hours PRN Temp greater or equal to 38C (100.4F)  ALBUTerol    90 MICROgram(s) HFA Inhaler 2 Puff(s) Inhalation every 6 hours PRN Shortness of Breath and/or Wheezing  dextrose Oral Gel 15 Gram(s) Oral once PRN Blood Glucose LESS THAN 70 milliGRAM(s)/deciliter  HYDROmorphone  Injectable 0.5 milliGRAM(s) IV Push every 4 hours PRN Moderate Pain (4 - 6)  oxyCODONE    IR 5 milliGRAM(s) Oral every 6 hours PRN Moderate Pain (4 - 6)  zolpidem 5 milliGRAM(s) Oral at bedtime PRN Insomnia        10-18    137  |  103  |  19  ----------------------------<  119<H>  4.2   |  28  |  0.58    Ca    8.7      18 Oct 2022 08:51  Phos  3.8     10-17  Mg     2.1     10-17                          12.9   10.33 )-----------( 286      ( 18 Oct 2022 08:51 )             40.4           10-17    139  |  104  |  16  ----------------------------<  114<H>  4.3   |  30  |  0.56    Ca    8.9      17 Oct 2022 07:50  Phos  3.8     10-17  Mg     2.1     10-17                          12.8   9.39  )-----------( 260      ( 17 Oct 2022 07:50 )             40.6                 Culture - Blood (collected 12 Oct 2022 20:53)  Source: .Blood None  Gram Stain (14 Oct 2022 03:00):    Growth in aerobic bottle: Gram Negative Rods  Final Report (15 Oct 2022 11:22):    Growth in aerobic bottle: Pseudomonas putida group    ***Blood Panel PCR results on this specimen are available    approximately 3 hours after the Gram stain result.***    Gram stain, PCR, and/or culture results may not always    correspond due to difference in methodologies.    ************************************************************    This PCR assay was performed by multiplex PCR. This    Assay tests for 66 bacterial and resistance gene targets.    Please refer to the [facility] Labs test directory    at [email or web address] for details.  Organism: Blood Culture PCR  Pseudomonas putida group (15 Oct 2022 11:22)  Organism: Pseudomonas putida group (15 Oct 2022 11:22)  Organism: Blood Culture PCR (15 Oct 2022 11:22)    Culture - Blood (collected 12 Oct 2022 20:26)  Source: .Blood None  Gram Stain (13 Oct 2022 22:37):    Growth in aerobic bottle: Gram Negative Rods  Final Report (15 Oct 2022 19:12):    Growth in aerobic bottle: Pseudomonas putida group    See previous culture [id number]    ***Blood Panel PCR results on this specimen are available    approximately 3 hours after the Gram stain result.***    Gram stain, PCR, and/or culture results may not always    correspond due to difference in methodologies.    ************************************************************    This PCR assay was performed by multiplex PCR. This    Assay tests for 66 bacterial and resistance gene targets.    Please refer to the [facility] Labs test directory    at [email or web address] for details.    A1C with Estimated Average Glucose (10.13.22 @ 05:44)   A1C with Estimated Average Glucose Result: 5.8Urinalysis (10.13.22 @ 02:45)   pH Urine: 6.5   Glucose Qualitative, Urine: Negative   Blood, Urine: Trace   Color: Yellow   Urine Appearance: Clear   Bilirubin: Negative   Ketone - Urine: Negative   Specific Gravity: 1.005   Protein, Urine: Negative   Urobilinogen: Negative   Nitrite: Negative   Leukocyte Esterase Concentration: Negative Urine Microscopic-Add On (NC) (10.13.22 @ 02:45)   Red Blood Cell - Urine: 3-5 /HPF   White Blood Cell - Urine: 0-2   Bacteria: Negative   Epithelial Cells: Occasional Lactate, Blood (10.13.22 @ 00:46)   Lactate, Blood: 2.1 mmol/L   Lactate, Blood (10.12.22 @ 20:26)   Lactate, Blood: 3.2 mmol/L      RADIOLOGY:  < from: CT Lower Extremity w/ IV Cont, Right (10.12.22 @ 22:34) >  IMPRESSION:    Diffuse stranding and skin thickening in the right and visualized left   lower extremity soft tissue, which may represent edema and/or cellulitis.   No discrete organized fluid collection in the visualized soft tissue. No   obvious bone erosion or periosteal reaction. If there is continued   clinical suspicion for soft tissue/bone infection, additional imaging   (MRI and/or nuclear scan) may be obtained for further evaluation.    < end of copied text >  < from: US Duplex Venous Lower Ext Ltd, Right (10.12.22 @ 22:06) >  IMPRESSION:    The right soleal vein was not visualized. No obvious thrombus in the   visualized right lower extremity deep veins.    < end of copied text >  < from: Xray Chest 1 View- PORTABLE-Urgent (10.12.22 @ 20:15) >  Impression:    No acute pulmonary disease.    < end of copied text >      EKG:  < from: 12 Lead ECG (10.12.22 @ 23:27) >  Diagnosis Line Atrial fibrillation with rapid ventricular response  Left axis deviation  Possible Anterior infarct (cited on or before 12-OCT-2022)  Abnormal ECG    < end of copied text >      ECHO:  < from: TTE Echo Complete w/Doppler (05.14.19 @ 18:07) >  Summary:   1. Left ventricular ejection fraction, by visual estimation, is 50 to   55%.   2. Low normal global left ventricular systolic function.   3. The left ventricular diastolic function could not be assessed in this   study.   4. Moderate to severe left atrial enlargement.   5. Mildly enlarged right atrium.   6. Moderate mitral annular calcification.   7. Thickening and calcification of the anterior and posterior mitral   valve leaflets.   8. Mild mitral valve regurgitation.   9. Peak transaortic gradient equals 30.4 mmHg, mean transaortic gradient   equals 15.8 mmHg, the calculated aortic valve area equals 1.35 cm² by the   continuity equation consistent with moderate aortic stenosis.  10. Estimated pulmonary artery systolic pressure is 36.6 mmHg assuming a   right atrial pressure of 3 mmHg, which is consistent with borderline   pulmonary hypertension.  11. There is no evidence of pericardial effusion.  12. Endocardial visualization was enhanced with intravenous echo contrast.

## 2022-10-19 LAB
ANION GAP SERPL CALC-SCNC: 4 MMOL/L — LOW (ref 5–17)
APTT BLD: 27.6 SEC — SIGNIFICANT CHANGE UP (ref 27.5–35.5)
BUN SERPL-MCNC: 18 MG/DL — SIGNIFICANT CHANGE UP (ref 7–23)
CALCIUM SERPL-MCNC: 8.4 MG/DL — LOW (ref 8.5–10.1)
CHLORIDE SERPL-SCNC: 102 MMOL/L — SIGNIFICANT CHANGE UP (ref 96–108)
CO2 SERPL-SCNC: 30 MMOL/L — SIGNIFICANT CHANGE UP (ref 22–31)
CREAT SERPL-MCNC: 0.65 MG/DL — SIGNIFICANT CHANGE UP (ref 0.5–1.3)
EGFR: 93 ML/MIN/1.73M2 — SIGNIFICANT CHANGE UP
GLUCOSE SERPL-MCNC: 150 MG/DL — HIGH (ref 70–99)
HCT VFR BLD CALC: 39.8 % — SIGNIFICANT CHANGE UP (ref 34.5–45)
HGB BLD-MCNC: 12.7 G/DL — SIGNIFICANT CHANGE UP (ref 11.5–15.5)
INR BLD: 1.3 RATIO — HIGH (ref 0.88–1.16)
MCHC RBC-ENTMCNC: 31.6 PG — SIGNIFICANT CHANGE UP (ref 27–34)
MCHC RBC-ENTMCNC: 31.9 GM/DL — LOW (ref 32–36)
MCV RBC AUTO: 99 FL — SIGNIFICANT CHANGE UP (ref 80–100)
PLATELET # BLD AUTO: 284 K/UL — SIGNIFICANT CHANGE UP (ref 150–400)
POTASSIUM SERPL-MCNC: 4 MMOL/L — SIGNIFICANT CHANGE UP (ref 3.5–5.3)
POTASSIUM SERPL-SCNC: 4 MMOL/L — SIGNIFICANT CHANGE UP (ref 3.5–5.3)
PROTHROM AB SERPL-ACNC: 15.1 SEC — HIGH (ref 10.5–13.4)
RBC # BLD: 4.02 M/UL — SIGNIFICANT CHANGE UP (ref 3.8–5.2)
RBC # FLD: 13.4 % — SIGNIFICANT CHANGE UP (ref 10.3–14.5)
SARS-COV-2 RNA SPEC QL NAA+PROBE: SIGNIFICANT CHANGE UP
SODIUM SERPL-SCNC: 136 MMOL/L — SIGNIFICANT CHANGE UP (ref 135–145)
WBC # BLD: 10.29 K/UL — SIGNIFICANT CHANGE UP (ref 3.8–10.5)
WBC # FLD AUTO: 10.29 K/UL — SIGNIFICANT CHANGE UP (ref 3.8–10.5)

## 2022-10-19 PROCEDURE — 99233 SBSQ HOSP IP/OBS HIGH 50: CPT

## 2022-10-19 RX ADMIN — GABAPENTIN 800 MILLIGRAM(S): 400 CAPSULE ORAL at 05:56

## 2022-10-19 RX ADMIN — HYDROMORPHONE HYDROCHLORIDE 0.5 MILLIGRAM(S): 2 INJECTION INTRAMUSCULAR; INTRAVENOUS; SUBCUTANEOUS at 03:49

## 2022-10-19 RX ADMIN — Medication 25 MILLIGRAM(S): at 05:55

## 2022-10-19 RX ADMIN — MORPHINE SULFATE 15 MILLIGRAM(S): 50 CAPSULE, EXTENDED RELEASE ORAL at 00:20

## 2022-10-19 RX ADMIN — CEFEPIME 100 MILLIGRAM(S): 1 INJECTION, POWDER, FOR SOLUTION INTRAMUSCULAR; INTRAVENOUS at 23:32

## 2022-10-19 RX ADMIN — Medication 4: at 17:18

## 2022-10-19 RX ADMIN — BUDESONIDE AND FORMOTEROL FUMARATE DIHYDRATE 2 PUFF(S): 160; 4.5 AEROSOL RESPIRATORY (INHALATION) at 21:33

## 2022-10-19 RX ADMIN — Medication 25 MILLIGRAM(S): at 16:42

## 2022-10-19 RX ADMIN — Medication 4: at 21:11

## 2022-10-19 RX ADMIN — Medication 25 MILLIGRAM(S): at 21:10

## 2022-10-19 RX ADMIN — CEFEPIME 100 MILLIGRAM(S): 1 INJECTION, POWDER, FOR SOLUTION INTRAMUSCULAR; INTRAVENOUS at 05:55

## 2022-10-19 RX ADMIN — BUDESONIDE AND FORMOTEROL FUMARATE DIHYDRATE 2 PUFF(S): 160; 4.5 AEROSOL RESPIRATORY (INHALATION) at 08:23

## 2022-10-19 RX ADMIN — Medication 10 MILLIGRAM(S): at 09:43

## 2022-10-19 RX ADMIN — MORPHINE SULFATE 15 MILLIGRAM(S): 50 CAPSULE, EXTENDED RELEASE ORAL at 09:43

## 2022-10-19 RX ADMIN — CEFEPIME 100 MILLIGRAM(S): 1 INJECTION, POWDER, FOR SOLUTION INTRAMUSCULAR; INTRAVENOUS at 16:42

## 2022-10-19 RX ADMIN — GABAPENTIN 800 MILLIGRAM(S): 400 CAPSULE ORAL at 21:10

## 2022-10-19 RX ADMIN — WARFARIN SODIUM 7.5 MILLIGRAM(S): 2.5 TABLET ORAL at 21:11

## 2022-10-19 RX ADMIN — HYDROMORPHONE HYDROCHLORIDE 0.5 MILLIGRAM(S): 2 INJECTION INTRAMUSCULAR; INTRAVENOUS; SUBCUTANEOUS at 09:16

## 2022-10-19 RX ADMIN — SIMVASTATIN 10 MILLIGRAM(S): 20 TABLET, FILM COATED ORAL at 21:10

## 2022-10-19 RX ADMIN — HYDROMORPHONE HYDROCHLORIDE 0.5 MILLIGRAM(S): 2 INJECTION INTRAMUSCULAR; INTRAVENOUS; SUBCUTANEOUS at 04:30

## 2022-10-19 RX ADMIN — GABAPENTIN 800 MILLIGRAM(S): 400 CAPSULE ORAL at 16:42

## 2022-10-19 RX ADMIN — MORPHINE SULFATE 15 MILLIGRAM(S): 50 CAPSULE, EXTENDED RELEASE ORAL at 23:32

## 2022-10-19 RX ADMIN — HYDROMORPHONE HYDROCHLORIDE 0.5 MILLIGRAM(S): 2 INJECTION INTRAMUSCULAR; INTRAVENOUS; SUBCUTANEOUS at 21:10

## 2022-10-19 RX ADMIN — HYDROMORPHONE HYDROCHLORIDE 0.5 MILLIGRAM(S): 2 INJECTION INTRAMUSCULAR; INTRAVENOUS; SUBCUTANEOUS at 21:40

## 2022-10-19 RX ADMIN — CHOLESTYRAMINE 4 GRAM(S): 4 POWDER, FOR SUSPENSION ORAL at 09:23

## 2022-10-19 NOTE — PROGRESS NOTE ADULT - ASSESSMENT
73 y/o female h/o obesity, Afib on coumadin, HFpEF, DM2, peripheral neuropathy, PVD, HTN, HLD, chronic b/l legs venous insufficiency, UC was admitted on 10/12 for fever and right lower extremity redness, swelling and pain. Patient called her vascular surgeons ( Dr. Yoo) office, who instructed her to come to the ER. Patient states the pain in her RLE worsened over the past 2 days PTA. She admits to increased fatigue and fever. She denies any SOB, CP, abdominal pain, N/V. In ER she was noted hypotensive and met sepsis criteria, given 30cc/kg, started on phenylephrine and received clindamycin, vancomycin IV and cefepime.     1. Febrile syndrome. Sepsis with PSPU. Extensive RLE cellulitis. B/L LE chronic stasis dermatitis. Obesity. Allergy to PCN.  -concern about toxic shock syndrome raised  -BC x 2, urine c/s noted  -s/p vancomycin 1500 mg IV q12h  -on cefepime 2 gm IV q8h # 7  -tolerating abx well so far; no side effects noted  -pseudomonas isolated is poorly sensitive to abx  -continue abx coverage   -repeat BC x 2 are negative to date from 10/16  -will require midline and 14 days IV cefepime 2gmq8h until 10/29/22 with weekly cbc, cmp, esr, crp  -elevate legs  -monitor temps  -f/u CBC      2. Other issues:   -care per medicine    d/w Dr. Delatorre

## 2022-10-19 NOTE — PROGRESS NOTE ADULT - SUBJECTIVE AND OBJECTIVE BOX
73 y/o female pmhx Afib on coumadin, HFpEF, DM2, peripheral neuropathy, PVD, HTN, HLD, obesity presented to ER  w/ fevers and right lower extremity pain. Patient called her vascular surgeons ( Dr. Yoo), who instructed her to come to the ER. Patient states the pain in her RLE worsened over the past 2 days. she admits to increased fatigue and fevers. She denies any SOB, CP, abdominal pain, N/V. In ER meet sepsis criteria, given 30cc/kg IVF, broad spectrum abx and pan cultured. Seen by vascular surgery in the ER, ordered imaging of RLE. Post IVF patient remained hypotensive, started on phenylephrine. Admitted to ICU then subsequently transferred to Medicine floor for further management.   Patient was started on IV antibiotic for RLE cellulitis.     No change in her status, c/w intermittent leg pain    Vital Signs Last 24 Hrs  T(C): 36.9 (19 Oct 2022 08:36), Max: 36.9 (18 Oct 2022 17:46)  T(F): 98.5 (19 Oct 2022 08:36), Max: 98.5 (19 Oct 2022 08:36)  HR: 100 (19 Oct 2022 08:36) (92 - 116)  BP: 141/74 (19 Oct 2022 08:36) (114/75 - 151/93)  BP(mean): --  RR: 18 (19 Oct 2022 08:36) (18 - 20)  SpO2: 97% (19 Oct 2022 08:36) (95% - 97%)    Parameters below as of 19 Oct 2022 08:36  Patient On (Oxygen Delivery Method): room air            ROS:   All 10 systems reviewed and found to be negative with the exception of what has been described above.     PE:  Constitutional: NAD, laying in bed  HEENT: NC/AT  Back: no tenderness  Respiratory: respirations even and non labored, LCTA  Cardiovascular: S1S2 regular, no murmurs  Abdomen: soft, not tender, not distended, positive BS  Genitourinary: voiding  Rectal: deferred  Musculoskeletal: no muscle tenderness, no joint swelling or tenderness  Extremities: improving RLE edema with pain. Decreased ROM, right inner ulcer/wound with scant serous drain. tender to palpation/ warm to touch- as per patient redness has improved.    Neurological: no focal deficits                            12.7   10.29 )-----------( 284      ( 19 Oct 2022 06:54 )             39.8   10-19    136  |  102  |  18  ----------------------------<  150<H>  4.0   |  30  |  0.65    Ca    8.4<L>      19 Oct 2022 06:54          Culture - Blood (collected 12 Oct 2022 20:53)  Source: .Blood None  Gram Stain (14 Oct 2022 03:00):    Growth in aerobic bottle: Gram Negative Rods  Final Report (15 Oct 2022 11:22):    Growth in aerobic bottle: Pseudomonas putida group    ***Blood Panel PCR results on this specimen are available    approximately 3 hours after the Gram stain result.***    Gram stain, PCR, and/or culture results may not always    correspond due to difference in methodologies.    ************************************************************    This PCR assay was performed by multiplex PCR. This    Assay tests for 66 bacterial and resistance gene targets.    Please refer to the [facility] test directory    at [email or web address] for details.  Organism: Blood Culture PCR  Pseudomonas putida group (15 Oct 2022 11:22)  Organism: Pseudomonas putida group (15 Oct 2022 11:22)  Organism: Blood Culture PCR (15 Oct 2022 11:22)          RADIOLOGY:  < from: CT Lower Extremity w/ IV Cont, Right (10.12.22 @ 22:34) >  IMPRESSION:    Diffuse stranding and skin thickening in the right and visualized left   lower extremity soft tissue, which may represent edema and/or cellulitis.   No discrete organized fluid collection in the visualized soft tissue. No   obvious bone erosion or periosteal reaction. If there is continued   clinical suspicion for soft tissue/bone infection, additional imaging   (MRI and/or nuclear scan) may be obtained for further evaluation.    < end of copied text >  < from: US Duplex Venous Lower Ext Ltd, Right (10.12.22 @ 22:06) >  IMPRESSION:    The right soleal vein was not visualized. No obvious thrombus in the   visualized right lower extremity deep veins.    < end of copied text >  < from: Xray Chest 1 View- PORTABLE-Urgent (10.12.22 @ 20:15) >  Impression:    No acute pulmonary disease.    < end of copied text >      EKG:  < from: 12 Lead ECG (10.12.22 @ 23:27) >  Diagnosis Line Atrial fibrillation with rapid ventricular response  Left axis deviation  Possible Anterior infarct (cited on or before 12-OCT-2022)  Abnormal ECG    < end of copied text >      ECHO:  < from: TTE Echo Complete w/Doppler (05.14.19 @ 18:07) >  Summary:   1. Left ventricular ejection fraction, by visual estimation, is 50 to   55%.   2. Low normal global left ventricular systolic function.   3. The left ventricular diastolic function could not be assessed in this   study.   4. Moderate to severe left atrial enlargement.   5. Mildly enlarged right atrium.   6. Moderate mitral annular calcification.   7. Thickening and calcification of the anterior and posterior mitral   valve leaflets.   8. Mild mitral valve regurgitation.   9. Peak transaortic gradient equals 30.4 mmHg, mean transaortic gradient   equals 15.8 mmHg, the calculated aortic valve area equals 1.35 cm² by the   continuity equation consistent with moderate aortic stenosis.  10. Estimated pulmonary artery systolic pressure is 36.6 mmHg assuming a   right atrial pressure of 3 mmHg, which is consistent with borderline   pulmonary hypertension.  11. There is no evidence of pericardial effusion.  12. Endocardial visualization was enhanced with intravenous echo contrast.    < from: 12 Lead ECG (10.12.22 @ 23:27) >  Diagnosis Line Atrial fibrillation with rapid ventricular response  Left axis deviation          MEDICATIONS  (STANDING):  budesonide 160 MICROgram(s)/formoterol 4.5 MICROgram(s) Inhaler 2 Puff(s) Inhalation two times a day  cefepime   IVPB 2000 milliGRAM(s) IV Intermittent every 8 hours  cholestyramine Powder (Sugar-Free) 4 Gram(s) Oral two times a day  gabapentin 800 milliGRAM(s) Oral three times a day  glucagon  Injectable 1 milliGRAM(s) IntraMuscular once  insulin lispro (ADMELOG) corrective regimen sliding scale   SubCutaneous Before meals and at bedtime  metoprolol tartrate 25 milliGRAM(s) Oral every 8 hours  morphine ER Tablet 15 milliGRAM(s) Oral every 12 hours  predniSONE   Tablet 10 milliGRAM(s) Oral daily  simvastatin 10 milliGRAM(s) Oral at bedtime  warfarin 7.5 milliGRAM(s) Oral daily

## 2022-10-19 NOTE — PROGRESS NOTE ADULT - ASSESSMENT
71 y/o female with pmhx of afib on coumadin, HFpEF, peripheral neuroapthy, DM II, PVD, HTN, HLD admitted on 11/12 for RLE pain admitted with:      *Septic Shock due to Diffuse RLE Cellulitis/Soft Tissue infection with Associated Pseduomonas bacteremia.  - ID consult appreciated  - Antibiotics as per ID  - BLood cuture showed pseudomonas       *Chronic Atrial Fibrillation - on coumadin  - INR 2-3 not at target but vka dose was increased 24H ago I will give her another 24H before adjusting  - rate controlled  - metoprolol    *DM II  - ISS  - Diabetic diet   a1c is 5.8    * Moderate AS per TTE  PCP Dr Mayer will d/w the case for OP fup    *HTN  *HLD  *Peripheral Neuropathy  On chronic Prednisone (unclear for with pathology)    Will contact PCP for more info re: prednisone  tried to call - busy; csw f/up re: home iv abx

## 2022-10-19 NOTE — PROGRESS NOTE ADULT - SUBJECTIVE AND OBJECTIVE BOX
Date of service: 10-19-22 @ 10:52    Pt seen and examined  Lying in bed in NAD  Right lower leg tender  No fevers  Less redness, swelling    ROS: no fever or chills; denies dizziness, no HA, no SOB or cough, no abdominal pain, no diarrhea or constipation; no dysuria    MEDICATIONS  (STANDING):  budesonide 160 MICROgram(s)/formoterol 4.5 MICROgram(s) Inhaler 2 Puff(s) Inhalation two times a day  cefepime   IVPB 2000 milliGRAM(s) IV Intermittent every 8 hours  cholestyramine Powder (Sugar-Free) 4 Gram(s) Oral two times a day  dextrose 5%. 1000 milliLiter(s) (50 mL/Hr) IV Continuous <Continuous>  dextrose 5%. 1000 milliLiter(s) (100 mL/Hr) IV Continuous <Continuous>  dextrose 50% Injectable 25 Gram(s) IV Push once  dextrose 50% Injectable 12.5 Gram(s) IV Push once  dextrose 50% Injectable 25 Gram(s) IV Push once  gabapentin 800 milliGRAM(s) Oral three times a day  glucagon  Injectable 1 milliGRAM(s) IntraMuscular once  insulin lispro (ADMELOG) corrective regimen sliding scale   SubCutaneous Before meals and at bedtime  metoprolol tartrate 25 milliGRAM(s) Oral every 8 hours  morphine ER Tablet 15 milliGRAM(s) Oral every 12 hours  predniSONE   Tablet 10 milliGRAM(s) Oral daily  simvastatin 10 milliGRAM(s) Oral at bedtime  warfarin 7.5 milliGRAM(s) Oral daily    Vital Signs Last 24 Hrs  T(C): 36.9 (19 Oct 2022 08:36), Max: 36.9 (18 Oct 2022 17:46)  T(F): 98.5 (19 Oct 2022 08:36), Max: 98.5 (19 Oct 2022 08:36)  HR: 100 (19 Oct 2022 08:36) (92 - 116)  BP: 141/74 (19 Oct 2022 08:36) (114/75 - 151/93)  BP(mean): --  RR: 18 (19 Oct 2022 08:36) (18 - 20)  SpO2: 97% (19 Oct 2022 08:36) (95% - 97%)    Parameters below as of 19 Oct 2022 08:36  Patient On (Oxygen Delivery Method): room air    Physical exam:  Constitutional:  No acute distress  HEENT: NC/AT, EOMI, PERRLA, conjunctivae clear; ears and nose atraumatic  Neck: supple; thyroid not palpable  Back: no tenderness  Respiratory: respiratory effort normal; clear to auscultation  Cardiovascular: S1S2 regular, no murmurs  Abdomen: soft, not tender, not distended, positive BS  Genitourinary: no suprapubic tenderness  Lymphatic: no LN palpable  Musculoskeletal: no muscle tenderness, no joint swelling or tenderness  Extremities: no pedal edema  Right foot, ankle, shin, calf extensive erythema, edema, warmth; thin skin - improving slowly   2 medial aspect necrotic ulcer; scant serous discharge around ankle  Left lower leg thickened skin with chronic discoloration  Neurological/ Psychiatric: AxOx3, judgement and insight normal; moving all extremities  Skin: no rashes; no palpable lesions    Labs: reviewed                        12.7   10.29 )-----------( 284      ( 19 Oct 2022 06:54 )             39.8     10-19    136  |  102  |  18  ----------------------------<  150<H>  4.0   |  30  |  0.65    Ca    8.4<L>      19 Oct 2022 06:54            Culture - Blood (collected 16 Oct 2022 06:24)  Source: .Blood None  Preliminary Report (17 Oct 2022 12:02):    No growth to date.    Culture - Blood (collected 16 Oct 2022 06:24)  Source: .Blood None  Preliminary Report (17 Oct 2022 12:02):    No growth to date.    Culture - Urine (collected 13 Oct 2022 02:46)  Source: Clean Catch Clean Catch (Midstream)  Final Report (14 Oct 2022 13:50):    No growth    Culture - Blood (collected 12 Oct 2022 20:53)  Source: .Blood None  Gram Stain (14 Oct 2022 03:00):    Growth in aerobic bottle: Gram Negative Rods  Final Report (15 Oct 2022 11:22):    Growth in aerobic bottle: Pseudomonas putida group  Organism: Blood Culture PCR  Pseudomonas putida group (15 Oct 2022 11:22)  Organism: Pseudomonas putida group (15 Oct 2022 11:22)      -  Amikacin: S <=16      -  Aztreonam: R >16      -  Cefepime: S 4      -  Ceftriaxone: S <=1      -  Ciprofloxacin: R >2      -  Gentamicin: S <=2      -  Levofloxacin: R >4      -  Meropenem: S 4      -  Piperacillin/Tazobactam: S <=8      -  Tobramycin: S <=2      -  Trimethoprim/Sulfamethoxazole: R >2/38      Method Type: TOYA  Organism: Blood Culture PCR (15 Oct 2022 11:22)      -  Blood PCR Panel: NEG      Method Type: PCR    Culture - Blood (collected 12 Oct 2022 20:26)  Source: .Blood None  Gram Stain (13 Oct 2022 22:37):    Growth in aerobic bottle: Gram Negative Rods  Final Report (15 Oct 2022 19:12):    Growth in aerobic bottle: Pseudomonas putida group    Radiology: all available radiological tests reviewed    Advanced directives addressed: full resuscitation

## 2022-10-20 ENCOUNTER — TRANSCRIPTION ENCOUNTER (OUTPATIENT)
Age: 72
End: 2022-10-20

## 2022-10-20 LAB
APTT BLD: 31.6 SEC — SIGNIFICANT CHANGE UP (ref 27.5–35.5)
INR BLD: 1.45 RATIO — HIGH (ref 0.88–1.16)
PROTHROM AB SERPL-ACNC: 16.9 SEC — HIGH (ref 10.5–13.4)

## 2022-10-20 PROCEDURE — 99233 SBSQ HOSP IP/OBS HIGH 50: CPT

## 2022-10-20 RX ORDER — HYDROMORPHONE HYDROCHLORIDE 2 MG/ML
0.5 INJECTION INTRAMUSCULAR; INTRAVENOUS; SUBCUTANEOUS EVERY 4 HOURS
Refills: 0 | Status: DISCONTINUED | OUTPATIENT
Start: 2022-10-21 | End: 2022-10-27

## 2022-10-20 RX ADMIN — Medication 2: at 11:47

## 2022-10-20 RX ADMIN — Medication 25 MILLIGRAM(S): at 13:14

## 2022-10-20 RX ADMIN — CEFEPIME 100 MILLIGRAM(S): 1 INJECTION, POWDER, FOR SOLUTION INTRAMUSCULAR; INTRAVENOUS at 06:31

## 2022-10-20 RX ADMIN — Medication 25 MILLIGRAM(S): at 22:51

## 2022-10-20 RX ADMIN — Medication 2: at 22:54

## 2022-10-20 RX ADMIN — MORPHINE SULFATE 15 MILLIGRAM(S): 50 CAPSULE, EXTENDED RELEASE ORAL at 09:42

## 2022-10-20 RX ADMIN — HYDROMORPHONE HYDROCHLORIDE 0.5 MILLIGRAM(S): 2 INJECTION INTRAMUSCULAR; INTRAVENOUS; SUBCUTANEOUS at 06:29

## 2022-10-20 RX ADMIN — MORPHINE SULFATE 15 MILLIGRAM(S): 50 CAPSULE, EXTENDED RELEASE ORAL at 10:31

## 2022-10-20 RX ADMIN — SIMVASTATIN 10 MILLIGRAM(S): 20 TABLET, FILM COATED ORAL at 22:51

## 2022-10-20 RX ADMIN — HYDROMORPHONE HYDROCHLORIDE 0.5 MILLIGRAM(S): 2 INJECTION INTRAMUSCULAR; INTRAVENOUS; SUBCUTANEOUS at 06:31

## 2022-10-20 RX ADMIN — HYDROMORPHONE HYDROCHLORIDE 0.5 MILLIGRAM(S): 2 INJECTION INTRAMUSCULAR; INTRAVENOUS; SUBCUTANEOUS at 22:49

## 2022-10-20 RX ADMIN — CEFEPIME 100 MILLIGRAM(S): 1 INJECTION, POWDER, FOR SOLUTION INTRAMUSCULAR; INTRAVENOUS at 13:15

## 2022-10-20 RX ADMIN — HYDROMORPHONE HYDROCHLORIDE 0.5 MILLIGRAM(S): 2 INJECTION INTRAMUSCULAR; INTRAVENOUS; SUBCUTANEOUS at 13:25

## 2022-10-20 RX ADMIN — BUDESONIDE AND FORMOTEROL FUMARATE DIHYDRATE 2 PUFF(S): 160; 4.5 AEROSOL RESPIRATORY (INHALATION) at 08:55

## 2022-10-20 RX ADMIN — WARFARIN SODIUM 7.5 MILLIGRAM(S): 2.5 TABLET ORAL at 22:52

## 2022-10-20 RX ADMIN — Medication 4: at 17:00

## 2022-10-20 RX ADMIN — Medication 10 MILLIGRAM(S): at 09:42

## 2022-10-20 RX ADMIN — GABAPENTIN 800 MILLIGRAM(S): 400 CAPSULE ORAL at 13:14

## 2022-10-20 RX ADMIN — HYDROMORPHONE HYDROCHLORIDE 0.5 MILLIGRAM(S): 2 INJECTION INTRAMUSCULAR; INTRAVENOUS; SUBCUTANEOUS at 13:26

## 2022-10-20 RX ADMIN — MORPHINE SULFATE 15 MILLIGRAM(S): 50 CAPSULE, EXTENDED RELEASE ORAL at 00:02

## 2022-10-20 RX ADMIN — GABAPENTIN 800 MILLIGRAM(S): 400 CAPSULE ORAL at 06:32

## 2022-10-20 RX ADMIN — Medication 25 MILLIGRAM(S): at 06:31

## 2022-10-20 RX ADMIN — BUDESONIDE AND FORMOTEROL FUMARATE DIHYDRATE 2 PUFF(S): 160; 4.5 AEROSOL RESPIRATORY (INHALATION) at 21:01

## 2022-10-20 RX ADMIN — GABAPENTIN 800 MILLIGRAM(S): 400 CAPSULE ORAL at 22:51

## 2022-10-20 RX ADMIN — HYDROMORPHONE HYDROCHLORIDE 0.5 MILLIGRAM(S): 2 INJECTION INTRAMUSCULAR; INTRAVENOUS; SUBCUTANEOUS at 23:19

## 2022-10-20 RX ADMIN — CEFEPIME 100 MILLIGRAM(S): 1 INJECTION, POWDER, FOR SOLUTION INTRAMUSCULAR; INTRAVENOUS at 22:51

## 2022-10-20 NOTE — DISCHARGE NOTE NURSING/CASE MANAGEMENT/SOCIAL WORK - NSDCPEFALRISK_GEN_ALL_CORE
For information on Fall & Injury Prevention, visit: [email or web address] OR  [email or web address] OR  https://www.cdc.gov/steadi/patient.html

## 2022-10-20 NOTE — PROGRESS NOTE ADULT - SUBJECTIVE AND OBJECTIVE BOX
73 y/o female pmhx Afib on coumadin, HFpEF, DM2, peripheral neuropathy, PVD, HTN, HLD, obesity presented to ER  w/ fevers and right lower extremity pain. Patient called her vascular surgeons ( Dr. Yoo), who instructed her to come to the ER. Patient states the pain in her RLE worsened over the past 2 days. she admits to increased fatigue and fevers. She denies any SOB, CP, abdominal pain, N/V. In ER meet sepsis criteria, given 30cc/kg IVF, broad spectrum abx and pan cultured. Seen by vascular surgery in the ER, ordered imaging of RLE. Post IVF patient remained hypotensive, started on phenylephrine. Admitted to ICU then subsequently transferred to Medicine floor for further management.   Patient was started on IV antibiotic for RLE cellulitis.     No change in her status, c/w intermittent leg pain    Vital Signs Last 24 Hrs  T(C): 36.6 (20 Oct 2022 08:00), Max: 37 (19 Oct 2022 16:22)  T(F): 97.8 (20 Oct 2022 08:00), Max: 98.6 (19 Oct 2022 16:22)  HR: 100 (20 Oct 2022 13:19) (81 - 106)  BP: 110/73 (20 Oct 2022 13:19) (110/73 - 153/74)  BP(mean): 93 (19 Oct 2022 20:58) (93 - 93)  RR: 18 (20 Oct 2022 08:00) (18 - 20)  SpO2: 94% (20 Oct 2022 08:50) (94% - 96%)    Parameters below as of 20 Oct 2022 08:50  Patient On (Oxygen Delivery Method): room air          Constitutional: NAD, laying in bed  HEENT: NC/AT  Back: no tenderness  Respiratory: respirations even and non labored, LCTA  Cardiovascular: S1S2 regular, no murmurs  Abdomen: soft, not tender, not distended, positive BS  Genitourinary: voiding  Rectal: deferred  Musculoskeletal: no muscle tenderness, no joint swelling or tenderness  Extremities: improving RLE edema with pain. Decreased ROM, right inner ulcer/wound with scant serous drain. tender to palpation/ warm to touch- as per patient redness has improved.    Neurological: no focal deficits                                           12.7   10.29 )-----------( 284      ( 19 Oct 2022 06:54 )             39.8   10-19    136  |  102  |  18  ----------------------------<  150<H>  4.0   |  30  |  0.65    Ca    8.4<L>      19 Oct 2022 06:54            Culture - Blood (collected 12 Oct 2022 20:53)  Source: .Blood None  Gram Stain (14 Oct 2022 03:00):    Growth in aerobic bottle: Gram Negative Rods  Final Report (15 Oct 2022 11:22):    Growth in aerobic bottle: Pseudomonas putida group    ***Blood Panel PCR results on this specimen are available    approximately 3 hours after the Gram stain result.***    Gram stain, PCR, and/or culture results may not always    correspond due to difference in methodologies.    ************************************************************    This PCR assay was performed by multiplex PCR. This    Assay tests for 66 bacterial and resistance gene targets.    Please refer to the [facility] test directory    at [email or web address] for details.  Organism: Blood Culture PCR  Pseudomonas putida group (15 Oct 2022 11:22)  Organism: Pseudomonas putida group (15 Oct 2022 11:22)  Organism: Blood Culture PCR (15 Oct 2022 11:22)          RADIOLOGY:  < from: CT Lower Extremity w/ IV Cont, Right (10.12.22 @ 22:34) >  IMPRESSION:    Diffuse stranding and skin thickening in the right and visualized left   lower extremity soft tissue, which may represent edema and/or cellulitis.   No discrete organized fluid collection in the visualized soft tissue. No   obvious bone erosion or periosteal reaction. If there is continued   clinical suspicion for soft tissue/bone infection, additional imaging   (MRI and/or nuclear scan) may be obtained for further evaluation.    < end of copied text >  < from: US Duplex Venous Lower Ext Ltd, Right (10.12.22 @ 22:06) >  IMPRESSION:    The right soleal vein was not visualized. No obvious thrombus in the   visualized right lower extremity deep veins.    < end of copied text >  < from: Xray Chest 1 View- PORTABLE-Urgent (10.12.22 @ 20:15) >  Impression:    No acute pulmonary disease.    < end of copied text >      EKG:  < from: 12 Lead ECG (10.12.22 @ 23:27) >  Diagnosis Line Atrial fibrillation with rapid ventricular response  Left axis deviation  Possible Anterior infarct (cited on or before 12-OCT-2022)  Abnormal ECG    < end of copied text >      ECHO:  < from: TTE Echo Complete w/Doppler (05.14.19 @ 18:07) >  Summary:   1. Left ventricular ejection fraction, by visual estimation, is 50 to   55%.   2. Low normal global left ventricular systolic function.   3. The left ventricular diastolic function could not be assessed in this   study.   4. Moderate to severe left atrial enlargement.   5. Mildly enlarged right atrium.   6. Moderate mitral annular calcification.   7. Thickening and calcification of the anterior and posterior mitral   valve leaflets.   8. Mild mitral valve regurgitation.   9. Peak transaortic gradient equals 30.4 mmHg, mean transaortic gradient   equals 15.8 mmHg, the calculated aortic valve area equals 1.35 cm² by the   continuity equation consistent with moderate aortic stenosis.  10. Estimated pulmonary artery systolic pressure is 36.6 mmHg assuming a   right atrial pressure of 3 mmHg, which is consistent with borderline   pulmonary hypertension.  11. There is no evidence of pericardial effusion.  12. Endocardial visualization was enhanced with intravenous echo contrast.    < from: 12 Lead ECG (10.12.22 @ 23:27) >  Diagnosis Line Atrial fibrillation with rapid ventricular response  Left axis deviation          MEDICATIONS  (STANDING):  budesonide 160 MICROgram(s)/formoterol 4.5 MICROgram(s) Inhaler 2 Puff(s) Inhalation two times a day  cefepime   IVPB 2000 milliGRAM(s) IV Intermittent every 8 hours  cholestyramine Powder (Sugar-Free) 4 Gram(s) Oral two times a day  gabapentin 800 milliGRAM(s) Oral three times a day  glucagon  Injectable 1 milliGRAM(s) IntraMuscular once  insulin lispro (ADMELOG) corrective regimen sliding scale   SubCutaneous Before meals and at bedtime  metoprolol tartrate 25 milliGRAM(s) Oral every 8 hours  morphine ER Tablet 15 milliGRAM(s) Oral every 12 hours  predniSONE   Tablet 10 milliGRAM(s) Oral daily  simvastatin 10 milliGRAM(s) Oral at bedtime  warfarin 7.5 milliGRAM(s) Oral daily

## 2022-10-20 NOTE — DISCHARGE NOTE NURSING/CASE MANAGEMENT/SOCIAL WORK - NSDCVIVACCINE_GEN_ALL_CORE_FT
No Vaccines Administered. O-L Flap Text: The defect edges were debeveled with a #15 scalpel blade.  Given the location of the defect, shape of the defect and the proximity to free margins an O-L flap was deemed most appropriate.  Using a sterile surgical marker, an appropriate advancement flap was drawn incorporating the defect and placing the expected incisions within the relaxed skin tension lines where possible.    The area thus outlined was incised deep to adipose tissue with a #15 scalpel blade.  The skin margins were undermined to an appropriate distance in all directions utilizing iris scissors.

## 2022-10-20 NOTE — PROGRESS NOTE ADULT - ASSESSMENT
71 y/o female with pmhx of afib on coumadin, HFpEF, peripheral neuroapthy, DM II, PVD, HTN, HLD admitted on 11/12 for RLE pain admitted with:      *Septic Shock due to Diffuse RLE Cellulitis/Soft Tissue infection with Associated Pseduomonas bacteremia.  - ID consult appreciated  - Antibiotics as per ID  - BLood cuture showed pseudomonas       *Chronic Atrial Fibrillation - on coumadin  - INR 2-3 not at target but vka dose was increased 24H ago I will give her another 24H before adjusting  - rate controlled  - metoprolol    *DM II  - ISS  - Diabetic diet   a1c is 5.8    * Moderate AS per TTE  PCP Dr Mayer will d/w the case for OP fup    *HTN  *HLD  *Peripheral Neuropathy  On chronic Prednisone (unclear for with pathology)    Per pt prednisone Rx by vascular for the leg pain  left message for  [phone]    Prepare for dc; pt anxious to leave

## 2022-10-20 NOTE — DISCHARGE NOTE NURSING/CASE MANAGEMENT/SOCIAL WORK - PATIENT PORTAL LINK FT
You can access the FollowMyHealth Patient Portal offered by [facility] by registering at the following website: [email or web address]. By joining [facility]’s FollowMyHealth portal, you will also be able to view your health information using other applications (apps) compatible with our system.

## 2022-10-21 ENCOUNTER — APPOINTMENT (OUTPATIENT)
Dept: VASCULAR SURGERY | Facility: CLINIC | Age: 72
End: 2022-10-21

## 2022-10-21 LAB
APTT BLD: 31 SEC — SIGNIFICANT CHANGE UP (ref 27.5–35.5)
CRP SERPL-MCNC: 24 MG/L — HIGH
CULTURE RESULTS: SIGNIFICANT CHANGE UP
CULTURE RESULTS: SIGNIFICANT CHANGE UP
ERYTHROCYTE [SEDIMENTATION RATE] IN BLOOD: 80 MM/HR — HIGH (ref 0–20)
INR BLD: 1.69 RATIO — HIGH (ref 0.88–1.16)
PROTHROM AB SERPL-ACNC: 19.7 SEC — HIGH (ref 10.5–13.4)
SPECIMEN SOURCE: SIGNIFICANT CHANGE UP
SPECIMEN SOURCE: SIGNIFICANT CHANGE UP

## 2022-10-21 PROCEDURE — 99233 SBSQ HOSP IP/OBS HIGH 50: CPT

## 2022-10-21 RX ORDER — OXYCODONE HYDROCHLORIDE 5 MG/1
5 TABLET ORAL EVERY 6 HOURS
Refills: 0 | Status: DISCONTINUED | OUTPATIENT
Start: 2022-10-22 | End: 2022-10-27

## 2022-10-21 RX ORDER — WARFARIN SODIUM 2.5 MG/1
8.5 TABLET ORAL DAILY
Refills: 0 | Status: COMPLETED | OUTPATIENT
Start: 2022-10-21 | End: 2022-10-23

## 2022-10-21 RX ADMIN — CEFEPIME 100 MILLIGRAM(S): 1 INJECTION, POWDER, FOR SOLUTION INTRAMUSCULAR; INTRAVENOUS at 22:27

## 2022-10-21 RX ADMIN — HYDROMORPHONE HYDROCHLORIDE 0.5 MILLIGRAM(S): 2 INJECTION INTRAMUSCULAR; INTRAVENOUS; SUBCUTANEOUS at 22:28

## 2022-10-21 RX ADMIN — Medication 25 MILLIGRAM(S): at 14:16

## 2022-10-21 RX ADMIN — SIMVASTATIN 10 MILLIGRAM(S): 20 TABLET, FILM COATED ORAL at 22:28

## 2022-10-21 RX ADMIN — Medication 8: at 17:07

## 2022-10-21 RX ADMIN — MORPHINE SULFATE 15 MILLIGRAM(S): 50 CAPSULE, EXTENDED RELEASE ORAL at 10:31

## 2022-10-21 RX ADMIN — MORPHINE SULFATE 15 MILLIGRAM(S): 50 CAPSULE, EXTENDED RELEASE ORAL at 11:00

## 2022-10-21 RX ADMIN — HYDROMORPHONE HYDROCHLORIDE 0.5 MILLIGRAM(S): 2 INJECTION INTRAMUSCULAR; INTRAVENOUS; SUBCUTANEOUS at 22:55

## 2022-10-21 RX ADMIN — GABAPENTIN 800 MILLIGRAM(S): 400 CAPSULE ORAL at 06:12

## 2022-10-21 RX ADMIN — HYDROMORPHONE HYDROCHLORIDE 0.5 MILLIGRAM(S): 2 INJECTION INTRAMUSCULAR; INTRAVENOUS; SUBCUTANEOUS at 06:14

## 2022-10-21 RX ADMIN — CEFEPIME 100 MILLIGRAM(S): 1 INJECTION, POWDER, FOR SOLUTION INTRAMUSCULAR; INTRAVENOUS at 06:12

## 2022-10-21 RX ADMIN — Medication 4: at 22:23

## 2022-10-21 RX ADMIN — MORPHINE SULFATE 15 MILLIGRAM(S): 50 CAPSULE, EXTENDED RELEASE ORAL at 00:40

## 2022-10-21 RX ADMIN — MORPHINE SULFATE 15 MILLIGRAM(S): 50 CAPSULE, EXTENDED RELEASE ORAL at 23:50

## 2022-10-21 RX ADMIN — Medication 25 MILLIGRAM(S): at 06:12

## 2022-10-21 RX ADMIN — MORPHINE SULFATE 15 MILLIGRAM(S): 50 CAPSULE, EXTENDED RELEASE ORAL at 01:10

## 2022-10-21 RX ADMIN — BUDESONIDE AND FORMOTEROL FUMARATE DIHYDRATE 2 PUFF(S): 160; 4.5 AEROSOL RESPIRATORY (INHALATION) at 20:46

## 2022-10-21 RX ADMIN — WARFARIN SODIUM 8.5 MILLIGRAM(S): 2.5 TABLET ORAL at 23:50

## 2022-10-21 RX ADMIN — GABAPENTIN 800 MILLIGRAM(S): 400 CAPSULE ORAL at 22:22

## 2022-10-21 RX ADMIN — OXYCODONE HYDROCHLORIDE 5 MILLIGRAM(S): 5 TABLET ORAL at 15:37

## 2022-10-21 RX ADMIN — OXYCODONE HYDROCHLORIDE 5 MILLIGRAM(S): 5 TABLET ORAL at 15:17

## 2022-10-21 RX ADMIN — CEFEPIME 100 MILLIGRAM(S): 1 INJECTION, POWDER, FOR SOLUTION INTRAMUSCULAR; INTRAVENOUS at 14:15

## 2022-10-21 RX ADMIN — GABAPENTIN 800 MILLIGRAM(S): 400 CAPSULE ORAL at 14:16

## 2022-10-21 RX ADMIN — Medication 40 MILLIGRAM(S): at 10:29

## 2022-10-21 RX ADMIN — Medication 25 MILLIGRAM(S): at 22:22

## 2022-10-21 RX ADMIN — Medication 1 APPLICATION(S): at 16:37

## 2022-10-21 RX ADMIN — BUDESONIDE AND FORMOTEROL FUMARATE DIHYDRATE 2 PUFF(S): 160; 4.5 AEROSOL RESPIRATORY (INHALATION) at 09:28

## 2022-10-21 NOTE — PROGRESS NOTE ADULT - SUBJECTIVE AND OBJECTIVE BOX
73 y/o female pmhx Afib on coumadin, HFpEF, DM2, peripheral neuropathy, PVD, HTN, HLD, obesity presented to ER  w/ fevers and right lower extremity pain. Patient called her vascular surgeons ( Dr. Yoo), who instructed her to come to the ER. Patient states the pain in her RLE worsened over the past 2 days. she admits to increased fatigue and fevers. She denies any SOB, CP, abdominal pain, N/V. In ER meet sepsis criteria, given 30cc/kg IVF, broad spectrum abx and pan cultured. Seen by vascular surgery in the ER, ordered imaging of RLE. Post IVF patient remained hypotensive, started on phenylephrine. Admitted to ICU then subsequently transferred to Medicine floor for further management.   Patient was started on IV antibiotic for RLE cellulitis.     No change in her status, c/w persistent maybe worsening RLE pain    Vital Signs Last 24 Hrs  T(C): 36.9 (21 Oct 2022 07:37), Max: 36.9 (20 Oct 2022 22:48)  T(F): 98.5 (21 Oct 2022 07:37), Max: 98.5 (21 Oct 2022 07:37)  HR: 84 (21 Oct 2022 09:20) (84 - 103)  BP: 145/81 (21 Oct 2022 07:37) (110/73 - 150/85)  BP(mean): --  RR: 18 (21 Oct 2022 07:37) (18 - 20)  SpO2: 96% (21 Oct 2022 09:20) (92% - 96%)    Parameters below as of 21 Oct 2022 09:20  Patient On (Oxygen Delivery Method): room air          Constitutional: NAD, laying in bed  HEENT: NC/AT  Back: no tenderness  Respiratory: respirations even and non labored, LCTA  Cardiovascular: S1S2 regular, no murmurs  Abdomen: soft, not tender, not distended, positive BS  Genitourinary: voiding  Rectal: deferred  Musculoskeletal: no muscle tenderness, no joint swelling or tenderness  Extremities: improving RLE edema with persistent  pain. Decreased ROM, right inner ulcer/wound with crust. tender to palpation/ warm to touch- as per patient redness has improved.    Neurological: no focal deficits                 no labs            Culture - Blood (collected 12 Oct 2022 20:53)  Source: .Blood None  Gram Stain (14 Oct 2022 03:00):    Growth in aerobic bottle: Gram Negative Rods  Final Report (15 Oct 2022 11:22):    Growth in aerobic bottle: Pseudomonas putida group    ***Blood Panel PCR results on this specimen are available    approximately 3 hours after the Gram stain result.***    Gram stain, PCR, and/or culture results may not always    correspond due to difference in methodologies.    ************************************************************    This PCR assay was performed by multiplex PCR. This    Assay tests for 66 bacterial and resistance gene targets.    Please refer to the [facility] test directory    at [email or web address] for details.  Organism: Blood Culture PCR  Pseudomonas putida group (15 Oct 2022 11:22)  Organism: Pseudomonas putida group (15 Oct 2022 11:22)  Organism: Blood Culture PCR (15 Oct 2022 11:22)          RADIOLOGY:  < from: CT Lower Extremity w/ IV Cont, Right (10.12.22 @ 22:34) >  IMPRESSION:    Diffuse stranding and skin thickening in the right and visualized left   lower extremity soft tissue, which may represent edema and/or cellulitis.   No discrete organized fluid collection in the visualized soft tissue. No   obvious bone erosion or periosteal reaction. If there is continued   clinical suspicion for soft tissue/bone infection, additional imaging   (MRI and/or nuclear scan) may be obtained for further evaluation.    < end of copied text >  < from: US Duplex Venous Lower Ext Ltd, Right (10.12.22 @ 22:06) >  IMPRESSION:    The right soleal vein was not visualized. No obvious thrombus in the   visualized right lower extremity deep veins.    < end of copied text >  < from: Xray Chest 1 View- PORTABLE-Urgent (10.12.22 @ 20:15) >  Impression:    No acute pulmonary disease.    < end of copied text >      EKG:  < from: 12 Lead ECG (10.12.22 @ 23:27) >  Diagnosis Line Atrial fibrillation with rapid ventricular response  Left axis deviation  Possible Anterior infarct (cited on or before 12-OCT-2022)  Abnormal ECG    < end of copied text >      ECHO:  < from: TTE Echo Complete w/Doppler (05.14.19 @ 18:07) >  Summary:   1. Left ventricular ejection fraction, by visual estimation, is 50 to   55%.   2. Low normal global left ventricular systolic function.   3. The left ventricular diastolic function could not be assessed in this   study.   4. Moderate to severe left atrial enlargement.   5. Mildly enlarged right atrium.   6. Moderate mitral annular calcification.   7. Thickening and calcification of the anterior and posterior mitral   valve leaflets.   8. Mild mitral valve regurgitation.   9. Peak transaortic gradient equals 30.4 mmHg, mean transaortic gradient   equals 15.8 mmHg, the calculated aortic valve area equals 1.35 cm² by the   continuity equation consistent with moderate aortic stenosis.  10. Estimated pulmonary artery systolic pressure is 36.6 mmHg assuming a   right atrial pressure of 3 mmHg, which is consistent with borderline   pulmonary hypertension.  11. There is no evidence of pericardial effusion.  12. Endocardial visualization was enhanced with intravenous echo contrast.    < from: 12 Lead ECG (10.12.22 @ 23:27) >  Diagnosis Line Atrial fibrillation with rapid ventricular response  Left axis deviation          MEDICATIONS  (STANDING):  budesonide 160 MICROgram(s)/formoterol 4.5 MICROgram(s) Inhaler 2 Puff(s) Inhalation two times a day  cefepime   IVPB 2000 milliGRAM(s) IV Intermittent every 8 hours  cholestyramine Powder (Sugar-Free) 4 Gram(s) Oral two times a day  gabapentin 800 milliGRAM(s) Oral three times a day  glucagon  Injectable 1 milliGRAM(s) IntraMuscular once  insulin lispro (ADMELOG) corrective regimen sliding scale   SubCutaneous Before meals and at bedtime  metoprolol tartrate 25 milliGRAM(s) Oral every 8 hours  morphine ER Tablet 15 milliGRAM(s) Oral every 12 hours  predniSONE   Tablet 10 milliGRAM(s) Oral daily  simvastatin 10 milliGRAM(s) Oral at bedtime  warfarin 7.5 milliGRAM(s) Oral daily

## 2022-10-21 NOTE — PROGRESS NOTE ADULT - ASSESSMENT
73 y/o female with pmhx of afib on coumadin, HFpEF, peripheral neuroapthy, DM II, PVD, HTN, HLD admitted on 11/12 for RLE pain admitted with:      *Septic Shock due to Diffuse RLE Cellulitis/Soft Tissue infection with Associated Pseduomonas bacteremia.  - ID consult appreciated  - Antibiotics as per ID  - Blood culture showed pseudomonas cleared now  - start IV steroids might have RA component, pain has not improved      *Chronic Atrial Fibrillation - on coumadin  - INR 2-3 not at target increase vka to 8.5 mg  - rate controlled  - metoprolol    *DM II  - ISS  - Diabetic diet   a1c is 5.8    * Moderate AS per TTE  PCP Dr Mayer will d/w the case for OP fup    *HTN  *HLD  *Peripheral Neuropathy  On chronic Prednisone (unclear for with pathology)    Per pt prednisone Rx by vascular for the leg pain  left message for  [phone]    case d/w son  zion will not do home IV abx

## 2022-10-22 LAB
ANION GAP SERPL CALC-SCNC: 3 MMOL/L — LOW (ref 5–17)
APTT BLD: 32.6 SEC — SIGNIFICANT CHANGE UP (ref 27.5–35.5)
BUN SERPL-MCNC: 23 MG/DL — SIGNIFICANT CHANGE UP (ref 7–23)
CALCIUM SERPL-MCNC: 8.6 MG/DL — SIGNIFICANT CHANGE UP (ref 8.5–10.1)
CHLORIDE SERPL-SCNC: 104 MMOL/L — SIGNIFICANT CHANGE UP (ref 96–108)
CO2 SERPL-SCNC: 31 MMOL/L — SIGNIFICANT CHANGE UP (ref 22–31)
CREAT SERPL-MCNC: 0.6 MG/DL — SIGNIFICANT CHANGE UP (ref 0.5–1.3)
EGFR: 95 ML/MIN/1.73M2 — SIGNIFICANT CHANGE UP
GLUCOSE SERPL-MCNC: 137 MG/DL — HIGH (ref 70–99)
HCT VFR BLD CALC: 39.9 % — SIGNIFICANT CHANGE UP (ref 34.5–45)
HGB BLD-MCNC: 12.5 G/DL — SIGNIFICANT CHANGE UP (ref 11.5–15.5)
INR BLD: 2.09 RATIO — HIGH (ref 0.88–1.16)
MCHC RBC-ENTMCNC: 31 PG — SIGNIFICANT CHANGE UP (ref 27–34)
MCHC RBC-ENTMCNC: 31.3 GM/DL — LOW (ref 32–36)
MCV RBC AUTO: 99 FL — SIGNIFICANT CHANGE UP (ref 80–100)
PLATELET # BLD AUTO: 322 K/UL — SIGNIFICANT CHANGE UP (ref 150–400)
POTASSIUM SERPL-MCNC: 4.1 MMOL/L — SIGNIFICANT CHANGE UP (ref 3.5–5.3)
POTASSIUM SERPL-SCNC: 4.1 MMOL/L — SIGNIFICANT CHANGE UP (ref 3.5–5.3)
PROTHROM AB SERPL-ACNC: 24.4 SEC — HIGH (ref 10.5–13.4)
RBC # BLD: 4.03 M/UL — SIGNIFICANT CHANGE UP (ref 3.8–5.2)
RBC # FLD: 13.2 % — SIGNIFICANT CHANGE UP (ref 10.3–14.5)
SODIUM SERPL-SCNC: 138 MMOL/L — SIGNIFICANT CHANGE UP (ref 135–145)
WBC # BLD: 11.74 K/UL — HIGH (ref 3.8–10.5)
WBC # FLD AUTO: 11.74 K/UL — HIGH (ref 3.8–10.5)

## 2022-10-22 PROCEDURE — 99233 SBSQ HOSP IP/OBS HIGH 50: CPT

## 2022-10-22 RX ADMIN — BUDESONIDE AND FORMOTEROL FUMARATE DIHYDRATE 2 PUFF(S): 160; 4.5 AEROSOL RESPIRATORY (INHALATION) at 09:44

## 2022-10-22 RX ADMIN — Medication 40 MILLIGRAM(S): at 09:34

## 2022-10-22 RX ADMIN — CEFEPIME 100 MILLIGRAM(S): 1 INJECTION, POWDER, FOR SOLUTION INTRAMUSCULAR; INTRAVENOUS at 15:41

## 2022-10-22 RX ADMIN — GABAPENTIN 800 MILLIGRAM(S): 400 CAPSULE ORAL at 15:41

## 2022-10-22 RX ADMIN — HYDROMORPHONE HYDROCHLORIDE 0.5 MILLIGRAM(S): 2 INJECTION INTRAMUSCULAR; INTRAVENOUS; SUBCUTANEOUS at 05:25

## 2022-10-22 RX ADMIN — Medication 8: at 17:51

## 2022-10-22 RX ADMIN — Medication 25 MILLIGRAM(S): at 21:58

## 2022-10-22 RX ADMIN — Medication 8: at 21:58

## 2022-10-22 RX ADMIN — CHOLESTYRAMINE 4 GRAM(S): 4 POWDER, FOR SUSPENSION ORAL at 09:34

## 2022-10-22 RX ADMIN — Medication 40 MILLIGRAM(S): at 17:51

## 2022-10-22 RX ADMIN — HYDROMORPHONE HYDROCHLORIDE 0.5 MILLIGRAM(S): 2 INJECTION INTRAMUSCULAR; INTRAVENOUS; SUBCUTANEOUS at 15:41

## 2022-10-22 RX ADMIN — CEFEPIME 100 MILLIGRAM(S): 1 INJECTION, POWDER, FOR SOLUTION INTRAMUSCULAR; INTRAVENOUS at 05:07

## 2022-10-22 RX ADMIN — Medication 1 APPLICATION(S): at 09:34

## 2022-10-22 RX ADMIN — Medication 4: at 12:05

## 2022-10-22 RX ADMIN — Medication 25 MILLIGRAM(S): at 15:40

## 2022-10-22 RX ADMIN — WARFARIN SODIUM 8.5 MILLIGRAM(S): 2.5 TABLET ORAL at 21:58

## 2022-10-22 RX ADMIN — HYDROMORPHONE HYDROCHLORIDE 0.5 MILLIGRAM(S): 2 INJECTION INTRAMUSCULAR; INTRAVENOUS; SUBCUTANEOUS at 04:55

## 2022-10-22 RX ADMIN — GABAPENTIN 800 MILLIGRAM(S): 400 CAPSULE ORAL at 21:57

## 2022-10-22 RX ADMIN — CEFEPIME 100 MILLIGRAM(S): 1 INJECTION, POWDER, FOR SOLUTION INTRAMUSCULAR; INTRAVENOUS at 21:57

## 2022-10-22 RX ADMIN — Medication 25 MILLIGRAM(S): at 05:08

## 2022-10-22 RX ADMIN — HYDROMORPHONE HYDROCHLORIDE 0.5 MILLIGRAM(S): 2 INJECTION INTRAMUSCULAR; INTRAVENOUS; SUBCUTANEOUS at 22:27

## 2022-10-22 RX ADMIN — BUDESONIDE AND FORMOTEROL FUMARATE DIHYDRATE 2 PUFF(S): 160; 4.5 AEROSOL RESPIRATORY (INHALATION) at 21:14

## 2022-10-22 RX ADMIN — MORPHINE SULFATE 15 MILLIGRAM(S): 50 CAPSULE, EXTENDED RELEASE ORAL at 12:05

## 2022-10-22 RX ADMIN — HYDROMORPHONE HYDROCHLORIDE 0.5 MILLIGRAM(S): 2 INJECTION INTRAMUSCULAR; INTRAVENOUS; SUBCUTANEOUS at 21:57

## 2022-10-22 RX ADMIN — SIMVASTATIN 10 MILLIGRAM(S): 20 TABLET, FILM COATED ORAL at 21:57

## 2022-10-22 RX ADMIN — GABAPENTIN 800 MILLIGRAM(S): 400 CAPSULE ORAL at 05:08

## 2022-10-22 RX ADMIN — HYDROMORPHONE HYDROCHLORIDE 0.5 MILLIGRAM(S): 2 INJECTION INTRAMUSCULAR; INTRAVENOUS; SUBCUTANEOUS at 09:31

## 2022-10-22 NOTE — PROGRESS NOTE ADULT - SUBJECTIVE AND OBJECTIVE BOX
71 y/o female pmhx Afib on coumadin, HFpEF, DM2, peripheral neuropathy, PVD, HTN, HLD, obesity presented to ER  w/ fevers and right lower extremity pain. Patient called her vascular surgeons ( Dr. Yoo), who instructed her to come to the ER. Patient states the pain in her RLE worsened over the past 2 days. she admits to increased fatigue and fevers. She denies any SOB, CP, abdominal pain, N/V. In ER meet sepsis criteria, given 30cc/kg IVF, broad spectrum abx and pan cultured. Seen by vascular surgery in the ER, ordered imaging of RLE. Post IVF patient remained hypotensive, started on phenylephrine. Admitted to ICU then subsequently transferred to Medicine floor for further management.   Patient was started on IV antibiotic for RLE cellulitis.     No change in her status, but after steroids started her leg pain improved    Vital Signs Last 24 Hrs  T(C): 37 (22 Oct 2022 08:46), Max: 37.1 (21 Oct 2022 22:15)  T(F): 98.6 (22 Oct 2022 08:46), Max: 98.8 (21 Oct 2022 22:15)  HR: 82 (22 Oct 2022 08:46) (82 - 103)  BP: 141/93 (22 Oct 2022 08:46) (129/83 - 156/79)  BP(mean): 107 (22 Oct 2022 08:46) (107 - 107)  RR: 18 (22 Oct 2022 08:46) (18 - 19)  SpO2: 94% (22 Oct 2022 08:46) (94% - 96%)    Parameters below as of 22 Oct 2022 08:46  Patient On (Oxygen Delivery Method): room air              Constitutional: NAD, laying in bed  HEENT: NC/AT  Back: no tenderness  Respiratory: respirations even and non labored, LCTA  Cardiovascular: S1S2 regular, no murmurs  Abdomen: soft, not tender, not distended, positive BS  Genitourinary: voiding  Rectal: deferred  Musculoskeletal: no muscle tenderness, no joint swelling or tenderness  Extremities: improving RLE edema with persistent  pain. Decreased ROM, right inner ulcer/wound with crust. tender to palpation/ warm to touch- as per patient redness has improved.    Neurological: no focal deficits                            12.5   11.74 )-----------( 322      ( 22 Oct 2022 08:00 )             39.9   10-22    138  |  104  |  23  ----------------------------<  137<H>  4.1   |  31  |  0.60    Ca    8.6      22 Oct 2022 08:00          Culture - Blood (collected 12 Oct 2022 20:53)  Source: .Blood None  Gram Stain (14 Oct 2022 03:00):    Growth in aerobic bottle: Gram Negative Rods  Final Report (15 Oct 2022 11:22):    Growth in aerobic bottle: Pseudomonas putida group    ***Blood Panel PCR results on this specimen are available    approximately 3 hours after the Gram stain result.***    Gram stain, PCR, and/or culture results may not always    correspond due to difference in methodologies.    ************************************************************    This PCR assay was performed by multiplex PCR. This    Assay tests for 66 bacterial and resistance gene targets.    Please refer to the [facility] test directory    at [email or web address] for details.  Organism: Blood Culture PCR  Pseudomonas putida group (15 Oct 2022 11:22)  Organism: Pseudomonas putida group (15 Oct 2022 11:22)  Organism: Blood Culture PCR (15 Oct 2022 11:22)          RADIOLOGY:  < from: CT Lower Extremity w/ IV Cont, Right (10.12.22 @ 22:34) >  IMPRESSION:    Diffuse stranding and skin thickening in the right and visualized left   lower extremity soft tissue, which may represent edema and/or cellulitis.   No discrete organized fluid collection in the visualized soft tissue. No   obvious bone erosion or periosteal reaction. If there is continued   clinical suspicion for soft tissue/bone infection, additional imaging   (MRI and/or nuclear scan) may be obtained for further evaluation.    < end of copied text >  < from: US Duplex Venous Lower Ext Ltd, Right (10.12.22 @ 22:06) >  IMPRESSION:    The right soleal vein was not visualized. No obvious thrombus in the   visualized right lower extremity deep veins.    < from: Xray Chest 1 View- PORTABLE-Urgent (10.12.22 @ 20:15) >  Impression:    No acute pulmonary disease.    < end of copied text >      EKG:  < from: 12 Lead ECG (10.12.22 @ 23:27) >  Diagnosis Line Atrial fibrillation with rapid ventricular response  Left axis deviation  Possible Anterior infarct (cited on or before 12-OCT-2022)  Abnormal ECG    < end of copied text >      ECHO:  < from: TTE Echo Complete w/Doppler (05.14.19 @ 18:07) >  Summary:   1. Left ventricular ejection fraction, by visual estimation, is 50 to   55%.   2. Low normal global left ventricular systolic function.   3. The left ventricular diastolic function could not be assessed in this   study.   4. Moderate to severe left atrial enlargement.   5. Mildly enlarged right atrium.   6. Moderate mitral annular calcification.   7. Thickening and calcification of the anterior and posterior mitral   valve leaflets.   8. Mild mitral valve regurgitation.   9. Peak transaortic gradient equals 30.4 mmHg, mean transaortic gradient   equals 15.8 mmHg, the calculated aortic valve area equals 1.35 cm² by the   continuity equation consistent with moderate aortic stenosis.  10. Estimated pulmonary artery systolic pressure is 36.6 mmHg assuming a   right atrial pressure of 3 mmHg, which is consistent with borderline   pulmonary hypertension.  11. There is no evidence of pericardial effusion.  12. Endocardial visualization was enhanced with intravenous echo contrast.    < from: 12 Lead ECG (10.12.22 @ 23:27) >  Diagnosis Line Atrial fibrillation with rapid ventricular response  Left axis deviation          MEDICATIONS  (STANDING):  budesonide 160 MICROgram(s)/formoterol 4.5 MICROgram(s) Inhaler 2 Puff(s) Inhalation two times a day  cefepime   IVPB 2000 milliGRAM(s) IV Intermittent every 8 hours  cholestyramine Powder (Sugar-Free) 4 Gram(s) Oral two times a day  gabapentin 800 milliGRAM(s) Oral three times a day  glucagon  Injectable 1 milliGRAM(s) IntraMuscular once  insulin lispro (ADMELOG) corrective regimen sliding scale   SubCutaneous Before meals and at bedtime  metoprolol tartrate 25 milliGRAM(s) Oral every 8 hours  morphine ER Tablet 15 milliGRAM(s) Oral every 12 hours  predniSONE   Tablet 10 milliGRAM(s) Oral daily  simvastatin 10 milliGRAM(s) Oral at bedtime  warfarin 7.5 milliGRAM(s) Oral daily

## 2022-10-22 NOTE — PROGRESS NOTE ADULT - ASSESSMENT
73 y/o female with pmhx of afib on coumadin, HFpEF, peripheral neuroapthy, DM II, PVD, HTN, HLD admitted on 11/12 for RLE pain admitted with:      *Septic Shock due to Diffuse RLE Cellulitis/Soft Tissue infection with Associated Pseduomonas bacteremia.  - Blood culture showed pseudomonas cleared now  - start IV steroids might have RA component, pain has not improved; very high inflammatory makers ESR CRP; increase steroids to 40 BID      *Chronic Atrial Fibrillation - on coumadin  - INR 2-3 not at target increase vka to 8.5 mg  - rate controlled  - metoprolol    *DM II  - ISS  - Diabetic diet   a1c is 5.8    * Moderate AS per TTE  PCP Dr Mayer will d/w the case for OP fup    *HTN  *HLD  *Peripheral Neuropathy  On chronic Prednisone (unclear for with pathology)    Per pt prednisone Rx by vascular for the leg pain  left message for  [phone]; case d/w son Francesco donovan will not do home IV abx

## 2022-10-23 LAB
APTT BLD: 34.6 SEC — SIGNIFICANT CHANGE UP (ref 27.5–35.5)
INR BLD: 2.65 RATIO — HIGH (ref 0.88–1.16)
PROTHROM AB SERPL-ACNC: 31 SEC — HIGH (ref 10.5–13.4)

## 2022-10-23 PROCEDURE — 99233 SBSQ HOSP IP/OBS HIGH 50: CPT

## 2022-10-23 RX ORDER — INSULIN GLARGINE 100 [IU]/ML
5 INJECTION, SOLUTION SUBCUTANEOUS AT BEDTIME
Refills: 0 | Status: DISCONTINUED | OUTPATIENT
Start: 2022-10-23 | End: 2022-10-27

## 2022-10-23 RX ADMIN — Medication 4: at 21:47

## 2022-10-23 RX ADMIN — BUDESONIDE AND FORMOTEROL FUMARATE DIHYDRATE 2 PUFF(S): 160; 4.5 AEROSOL RESPIRATORY (INHALATION) at 08:52

## 2022-10-23 RX ADMIN — Medication 25 MILLIGRAM(S): at 06:40

## 2022-10-23 RX ADMIN — HYDROMORPHONE HYDROCHLORIDE 0.5 MILLIGRAM(S): 2 INJECTION INTRAMUSCULAR; INTRAVENOUS; SUBCUTANEOUS at 02:45

## 2022-10-23 RX ADMIN — HYDROMORPHONE HYDROCHLORIDE 0.5 MILLIGRAM(S): 2 INJECTION INTRAMUSCULAR; INTRAVENOUS; SUBCUTANEOUS at 02:13

## 2022-10-23 RX ADMIN — HYDROMORPHONE HYDROCHLORIDE 0.5 MILLIGRAM(S): 2 INJECTION INTRAMUSCULAR; INTRAVENOUS; SUBCUTANEOUS at 21:53

## 2022-10-23 RX ADMIN — GABAPENTIN 800 MILLIGRAM(S): 400 CAPSULE ORAL at 06:40

## 2022-10-23 RX ADMIN — HYDROMORPHONE HYDROCHLORIDE 0.5 MILLIGRAM(S): 2 INJECTION INTRAMUSCULAR; INTRAVENOUS; SUBCUTANEOUS at 15:13

## 2022-10-23 RX ADMIN — GABAPENTIN 800 MILLIGRAM(S): 400 CAPSULE ORAL at 14:38

## 2022-10-23 RX ADMIN — Medication 1 APPLICATION(S): at 10:43

## 2022-10-23 RX ADMIN — Medication 4: at 12:36

## 2022-10-23 RX ADMIN — Medication 40 MILLIGRAM(S): at 06:43

## 2022-10-23 RX ADMIN — CEFEPIME 100 MILLIGRAM(S): 1 INJECTION, POWDER, FOR SOLUTION INTRAMUSCULAR; INTRAVENOUS at 21:53

## 2022-10-23 RX ADMIN — CEFEPIME 100 MILLIGRAM(S): 1 INJECTION, POWDER, FOR SOLUTION INTRAMUSCULAR; INTRAVENOUS at 14:37

## 2022-10-23 RX ADMIN — Medication 25 MILLIGRAM(S): at 14:38

## 2022-10-23 RX ADMIN — WARFARIN SODIUM 8.5 MILLIGRAM(S): 2.5 TABLET ORAL at 21:50

## 2022-10-23 RX ADMIN — HYDROMORPHONE HYDROCHLORIDE 0.5 MILLIGRAM(S): 2 INJECTION INTRAMUSCULAR; INTRAVENOUS; SUBCUTANEOUS at 06:43

## 2022-10-23 RX ADMIN — HYDROMORPHONE HYDROCHLORIDE 0.5 MILLIGRAM(S): 2 INJECTION INTRAMUSCULAR; INTRAVENOUS; SUBCUTANEOUS at 07:13

## 2022-10-23 RX ADMIN — Medication 2: at 08:09

## 2022-10-23 RX ADMIN — BUDESONIDE AND FORMOTEROL FUMARATE DIHYDRATE 2 PUFF(S): 160; 4.5 AEROSOL RESPIRATORY (INHALATION) at 20:00

## 2022-10-23 RX ADMIN — INSULIN GLARGINE 5 UNIT(S): 100 INJECTION, SOLUTION SUBCUTANEOUS at 21:50

## 2022-10-23 RX ADMIN — MORPHINE SULFATE 15 MILLIGRAM(S): 50 CAPSULE, EXTENDED RELEASE ORAL at 10:43

## 2022-10-23 RX ADMIN — SIMVASTATIN 10 MILLIGRAM(S): 20 TABLET, FILM COATED ORAL at 21:52

## 2022-10-23 RX ADMIN — GABAPENTIN 800 MILLIGRAM(S): 400 CAPSULE ORAL at 21:51

## 2022-10-23 RX ADMIN — Medication 25 MILLIGRAM(S): at 21:52

## 2022-10-23 RX ADMIN — Medication 8: at 17:40

## 2022-10-23 RX ADMIN — CEFEPIME 100 MILLIGRAM(S): 1 INJECTION, POWDER, FOR SOLUTION INTRAMUSCULAR; INTRAVENOUS at 06:39

## 2022-10-23 NOTE — PROGRESS NOTE ADULT - SUBJECTIVE AND OBJECTIVE BOX
73 y/o female pmhx Afib on coumadin, HFpEF, DM2, peripheral neuropathy, PVD, HTN, HLD, obesity presented to ER  w/ fevers and right lower extremity pain. Patient called her vascular surgeons ( Dr. Yoo), who instructed her to come to the ER. Patient states the pain in her RLE worsened over the past 2 days. she admits to increased fatigue and fevers. She denies any SOB, CP, abdominal pain, N/V. In ER meet sepsis criteria, given 30cc/kg IVF, broad spectrum abx and pan cultured. Seen by vascular surgery in the ER, ordered imaging of RLE. Post IVF patient remained hypotensive, started on phenylephrine. Admitted to ICU then subsequently transferred to Medicine floor for further management.   Patient was started on IV antibiotic for RLE cellulitis.     No change in her status, but after steroids started her leg pain improved    Vital Signs Last 24 Hrs  T(C): 36.7 (23 Oct 2022 08:57), Max: 36.9 (22 Oct 2022 20:58)  T(F): 98.1 (23 Oct 2022 08:57), Max: 98.4 (22 Oct 2022 20:58)  HR: 94 (23 Oct 2022 08:57) (92 - 107)  BP: 148/86 (23 Oct 2022 08:57) (112/95 - 152/82)  BP(mean): 106 (23 Oct 2022 08:57) (106 - 110)  RR: 18 (23 Oct 2022 08:57) (18 - 19)  SpO2: 92% (23 Oct 2022 08:57) (92% - 95%)    Parameters below as of 23 Oct 2022 08:57  Patient On (Oxygen Delivery Method): room air                  Constitutional: NAD, laying in bed  HEENT: NC/AT  Back: no tenderness  Respiratory: respirations even and non labored, LCTA  Cardiovascular: S1S2 regular, no murmurs  Abdomen: soft, not tender, not distended, positive BS  Genitourinary: voiding  Rectal: deferred  Musculoskeletal: no muscle tenderness, no joint swelling or tenderness  Extremities: improving RLE edema with persistent  pain. Decreased ROM, right inner ulcer/wound with crust. tender to palpation/ warm to touch- as per patient redness has improved.    Neurological: no focal deficits                            12.5   11.74 )-----------( 322      ( 22 Oct 2022 08:00 )             39.9   10-22    138  |  104  |  23  ----------------------------<  137<H>  4.1   |  31  |  0.60    Ca    8.6      22 Oct 2022 08:00          Culture - Blood (collected 12 Oct 2022 20:53)  Source: .Blood None  Gram Stain (14 Oct 2022 03:00):    Growth in aerobic bottle: Gram Negative Rods  Final Report (15 Oct 2022 11:22):    Growth in aerobic bottle: Pseudomonas putida group    ***Blood Panel PCR results on this specimen are available    approximately 3 hours after the Gram stain result.***    Gram stain, PCR, and/or culture results may not always    correspond due to difference in methodologies.    ************************************************************    This PCR assay was performed by multiplex PCR. This    Assay tests for 66 bacterial and resistance gene targets.    Please refer to the [facility] test directory    at [email or web address] for details.  Organism: Blood Culture PCR  Pseudomonas putida group (15 Oct 2022 11:22)  Organism: Pseudomonas putida group (15 Oct 2022 11:22)  Organism: Blood Culture PCR (15 Oct 2022 11:22)          RADIOLOGY:  < from: CT Lower Extremity w/ IV Cont, Right (10.12.22 @ 22:34) >  IMPRESSION:    Diffuse stranding and skin thickening in the right and visualized left   lower extremity soft tissue, which may represent edema and/or cellulitis.   No discrete organized fluid collection in the visualized soft tissue. No   obvious bone erosion or periosteal reaction. If there is continued   clinical suspicion for soft tissue/bone infection, additional imaging   (MRI and/or nuclear scan) may be obtained for further evaluation.    < end of copied text >  < from: US Duplex Venous Lower Ext Ltd, Right (10.12.22 @ 22:06) >  IMPRESSION:    The right soleal vein was not visualized. No obvious thrombus in the   visualized right lower extremity deep veins.    < from: Xray Chest 1 View- PORTABLE-Urgent (10.12.22 @ 20:15) >  Impression:    No acute pulmonary disease.    < end of copied text >      EKG:  < from: 12 Lead ECG (10.12.22 @ 23:27) >  Diagnosis Line Atrial fibrillation with rapid ventricular response  Left axis deviation  Possible Anterior infarct (cited on or before 12-OCT-2022)  Abnormal ECG    < end of copied text >      ECHO:  < from: TTE Echo Complete w/Doppler (05.14.19 @ 18:07) >  Summary:   1. Left ventricular ejection fraction, by visual estimation, is 50 to   55%.   2. Low normal global left ventricular systolic function.   3. The left ventricular diastolic function could not be assessed in this   study.   4. Moderate to severe left atrial enlargement.   5. Mildly enlarged right atrium.   6. Moderate mitral annular calcification.   7. Thickening and calcification of the anterior and posterior mitral   valve leaflets.   8. Mild mitral valve regurgitation.   9. Peak transaortic gradient equals 30.4 mmHg, mean transaortic gradient   equals 15.8 mmHg, the calculated aortic valve area equals 1.35 cm² by the   continuity equation consistent with moderate aortic stenosis.  10. Estimated pulmonary artery systolic pressure is 36.6 mmHg assuming a   right atrial pressure of 3 mmHg, which is consistent with borderline   pulmonary hypertension.  11. There is no evidence of pericardial effusion.  12. Endocardial visualization was enhanced with intravenous echo contrast.    < from: 12 Lead ECG (10.12.22 @ 23:27) >  Diagnosis Line Atrial fibrillation with rapid ventricular response  Left axis deviation          MEDICATIONS  (STANDING):  budesonide 160 MICROgram(s)/formoterol 4.5 MICROgram(s) Inhaler 2 Puff(s) Inhalation two times a day  cefepime   IVPB 2000 milliGRAM(s) IV Intermittent every 8 hours  cholestyramine Powder (Sugar-Free) 4 Gram(s) Oral two times a day  gabapentin 800 milliGRAM(s) Oral three times a day  glucagon  Injectable 1 milliGRAM(s) IntraMuscular once  insulin lispro (ADMELOG) corrective regimen sliding scale   SubCutaneous Before meals and at bedtime  metoprolol tartrate 25 milliGRAM(s) Oral every 8 hours  morphine ER Tablet 15 milliGRAM(s) Oral every 12 hours  predniSONE   Tablet 10 milliGRAM(s) Oral daily  simvastatin 10 milliGRAM(s) Oral at bedtime  warfarin 7.5 milliGRAM(s) Oral daily

## 2022-10-23 NOTE — PROGRESS NOTE ADULT - ASSESSMENT
71 y/o female with pmhx of afib on coumadin, HFpEF, peripheral neuroapthy, DM II, PVD, HTN, HLD admitted on 11/12 for RLE pain admitted with:      *Septic Shock due to Diffuse RLE Cellulitis/Soft Tissue infection with Associated Pseduomonas bacteremia.  - Blood culture showed pseudomonas cleared now  - start IV steroids might have RA component, pain has not improved; very high inflammatory makers ESR CRP; increase steroids to 40 BID for 24H, decrease to QD      *Chronic Atrial Fibrillation - on coumadin  - INR 2-3 not at target increase vka to 8.5 mg  - rate controlled  - metoprolol    *DM II  - ISS  - Diabetic diet   a1c is 5.8  uncontrolled due to steroids  add HAYDEN    * Moderate AS per TTE  PCP Dr Mayer will d/w the case for OP fup    *HTN  *HLD  *Peripheral Neuropathy  On chronic Prednisone (unclear for with pathology)    Per pt prednisone Rx by vascular for the leg pain  left message for  [phone]; case d/w son Francesco donovan will not do home IV abx

## 2022-10-24 LAB
APTT BLD: 32.7 SEC — SIGNIFICANT CHANGE UP (ref 27.5–35.5)
ERYTHROCYTE [SEDIMENTATION RATE] IN BLOOD: 46 MM/HR — HIGH (ref 0–20)
INR BLD: 3.54 RATIO — HIGH (ref 0.88–1.16)
PROTHROM AB SERPL-ACNC: 41.6 SEC — HIGH (ref 10.5–13.4)

## 2022-10-24 PROCEDURE — 99232 SBSQ HOSP IP/OBS MODERATE 35: CPT

## 2022-10-24 RX ORDER — MORPHINE SULFATE 50 MG/1
15 CAPSULE, EXTENDED RELEASE ORAL EVERY 12 HOURS
Refills: 0 | Status: DISCONTINUED | OUTPATIENT
Start: 2022-10-24 | End: 2022-10-27

## 2022-10-24 RX ADMIN — GABAPENTIN 800 MILLIGRAM(S): 400 CAPSULE ORAL at 06:23

## 2022-10-24 RX ADMIN — Medication 1 APPLICATION(S): at 10:55

## 2022-10-24 RX ADMIN — CEFEPIME 100 MILLIGRAM(S): 1 INJECTION, POWDER, FOR SOLUTION INTRAMUSCULAR; INTRAVENOUS at 22:33

## 2022-10-24 RX ADMIN — BUDESONIDE AND FORMOTEROL FUMARATE DIHYDRATE 2 PUFF(S): 160; 4.5 AEROSOL RESPIRATORY (INHALATION) at 08:59

## 2022-10-24 RX ADMIN — Medication 25 MILLIGRAM(S): at 06:23

## 2022-10-24 RX ADMIN — HYDROMORPHONE HYDROCHLORIDE 0.5 MILLIGRAM(S): 2 INJECTION INTRAMUSCULAR; INTRAVENOUS; SUBCUTANEOUS at 16:09

## 2022-10-24 RX ADMIN — Medication 25 MILLIGRAM(S): at 15:05

## 2022-10-24 RX ADMIN — Medication 8: at 17:06

## 2022-10-24 RX ADMIN — CEFEPIME 100 MILLIGRAM(S): 1 INJECTION, POWDER, FOR SOLUTION INTRAMUSCULAR; INTRAVENOUS at 06:34

## 2022-10-24 RX ADMIN — HYDROMORPHONE HYDROCHLORIDE 0.5 MILLIGRAM(S): 2 INJECTION INTRAMUSCULAR; INTRAVENOUS; SUBCUTANEOUS at 06:26

## 2022-10-24 RX ADMIN — HYDROMORPHONE HYDROCHLORIDE 0.5 MILLIGRAM(S): 2 INJECTION INTRAMUSCULAR; INTRAVENOUS; SUBCUTANEOUS at 22:32

## 2022-10-24 RX ADMIN — HYDROMORPHONE HYDROCHLORIDE 0.5 MILLIGRAM(S): 2 INJECTION INTRAMUSCULAR; INTRAVENOUS; SUBCUTANEOUS at 15:39

## 2022-10-24 RX ADMIN — Medication 40 MILLIGRAM(S): at 10:55

## 2022-10-24 RX ADMIN — INSULIN GLARGINE 5 UNIT(S): 100 INJECTION, SOLUTION SUBCUTANEOUS at 22:33

## 2022-10-24 RX ADMIN — GABAPENTIN 800 MILLIGRAM(S): 400 CAPSULE ORAL at 15:05

## 2022-10-24 RX ADMIN — CEFEPIME 100 MILLIGRAM(S): 1 INJECTION, POWDER, FOR SOLUTION INTRAMUSCULAR; INTRAVENOUS at 15:05

## 2022-10-24 RX ADMIN — Medication 25 MILLIGRAM(S): at 22:32

## 2022-10-24 RX ADMIN — GABAPENTIN 800 MILLIGRAM(S): 400 CAPSULE ORAL at 22:32

## 2022-10-24 RX ADMIN — HYDROMORPHONE HYDROCHLORIDE 0.5 MILLIGRAM(S): 2 INJECTION INTRAMUSCULAR; INTRAVENOUS; SUBCUTANEOUS at 22:46

## 2022-10-24 RX ADMIN — Medication 6: at 22:34

## 2022-10-24 RX ADMIN — MORPHINE SULFATE 15 MILLIGRAM(S): 50 CAPSULE, EXTENDED RELEASE ORAL at 17:03

## 2022-10-24 RX ADMIN — CHOLESTYRAMINE 4 GRAM(S): 4 POWDER, FOR SUSPENSION ORAL at 10:55

## 2022-10-24 RX ADMIN — MORPHINE SULFATE 15 MILLIGRAM(S): 50 CAPSULE, EXTENDED RELEASE ORAL at 17:33

## 2022-10-24 RX ADMIN — CHOLESTYRAMINE 4 GRAM(S): 4 POWDER, FOR SUSPENSION ORAL at 22:24

## 2022-10-24 RX ADMIN — BUDESONIDE AND FORMOTEROL FUMARATE DIHYDRATE 2 PUFF(S): 160; 4.5 AEROSOL RESPIRATORY (INHALATION) at 20:46

## 2022-10-24 RX ADMIN — SIMVASTATIN 10 MILLIGRAM(S): 20 TABLET, FILM COATED ORAL at 22:32

## 2022-10-24 NOTE — PROGRESS NOTE ADULT - SUBJECTIVE AND OBJECTIVE BOX
Date of service: 10-24-22 @ 14:14    Pt seen and examined  RLE improving slowly  Feels better  Has less burning and redness has improved  Has no fever     ROS: no fever or chills; denies dizziness, no HA, no SOB or cough, no abdominal pain, no diarrhea or constipation; no dysuria      MEDICATIONS  (STANDING):  budesonide 160 MICROgram(s)/formoterol 4.5 MICROgram(s) Inhaler 2 Puff(s) Inhalation two times a day  cefepime   IVPB 2000 milliGRAM(s) IV Intermittent every 8 hours  cholestyramine Powder (Sugar-Free) 4 Gram(s) Oral two times a day  dextrose 5%. 1000 milliLiter(s) (50 mL/Hr) IV Continuous <Continuous>  dextrose 5%. 1000 milliLiter(s) (100 mL/Hr) IV Continuous <Continuous>  dextrose 50% Injectable 12.5 Gram(s) IV Push once  dextrose 50% Injectable 25 Gram(s) IV Push once  dextrose 50% Injectable 25 Gram(s) IV Push once  gabapentin 800 milliGRAM(s) Oral three times a day  glucagon  Injectable 1 milliGRAM(s) IntraMuscular once  insulin glargine Injectable (LANTUS) 5 Unit(s) SubCutaneous at bedtime  insulin lispro (ADMELOG) corrective regimen sliding scale   SubCutaneous Before meals and at bedtime  methylPREDNISolone sodium succinate Injectable 40 milliGRAM(s) IV Push daily  metoprolol tartrate 25 milliGRAM(s) Oral every 8 hours  silver sulfADIAZINE 1% Cream 1 Application(s) Topical daily  simvastatin 10 milliGRAM(s) Oral at bedtime    Vital Signs Last 24 Hrs  T(C): 36.8 (24 Oct 2022 08:19), Max: 37.2 (23 Oct 2022 17:24)  T(F): 98.2 (24 Oct 2022 08:19), Max: 99 (23 Oct 2022 17:24)  HR: 90 (24 Oct 2022 08:19) (74 - 111)  BP: 145/86 (24 Oct 2022 08:19) (128/60 - 145/86)  BP(mean): --  RR: 18 (24 Oct 2022 08:19) (18 - 20)  SpO2: 95% (24 Oct 2022 08:19) (95% - 97%)    Parameters below as of 24 Oct 2022 09:01  Patient On (Oxygen Delivery Method): room air    Physical exam:  Constitutional:  No acute distress  HEENT: NC/AT, EOMI, PERRLA, conjunctivae clear; ears and nose atraumatic  Neck: supple; thyroid not palpable  Back: no tenderness  Respiratory: respiratory effort normal; clear to auscultation  Cardiovascular: S1S2 regular, no murmurs  Abdomen: soft, not tender, not distended, positive BS  Genitourinary: no suprapubic tenderness  Lymphatic: no LN palpable  Musculoskeletal: no muscle tenderness, no joint swelling or tenderness  Extremities: no pedal edema  Right foot, ankle, shin, calf extensive erythema, edema, warmth; thin skin - improving slowly   2 medial aspect necrotic ulcer; scant serous discharge around ankle  Left lower leg thickened skin with chronic discoloration  Neurological/ Psychiatric: AxOx3, judgement and insight normal; moving all extremities  Skin: no rashes; no palpable lesions    Labs: reviewed                     Culture - Blood (collected 16 Oct 2022 06:24)  Source: .Blood None  Preliminary Report (17 Oct 2022 12:02):    No growth to date.    Culture - Blood (collected 16 Oct 2022 06:24)  Source: .Blood None  Preliminary Report (17 Oct 2022 12:02):    No growth to date.    Culture - Urine (collected 13 Oct 2022 02:46)  Source: Clean Catch Clean Catch (Midstream)  Final Report (14 Oct 2022 13:50):    No growth    Culture - Blood (collected 12 Oct 2022 20:53)  Source: .Blood None  Gram Stain (14 Oct 2022 03:00):    Growth in aerobic bottle: Gram Negative Rods  Final Report (15 Oct 2022 11:22):    Growth in aerobic bottle: Pseudomonas putida group  Organism: Blood Culture PCR  Pseudomonas putida group (15 Oct 2022 11:22)  Organism: Pseudomonas putida group (15 Oct 2022 11:22)      -  Amikacin: S <=16      -  Aztreonam: R >16      -  Cefepime: S 4      -  Ceftriaxone: S <=1      -  Ciprofloxacin: R >2      -  Gentamicin: S <=2      -  Levofloxacin: R >4      -  Meropenem: S 4      -  Piperacillin/Tazobactam: S <=8      -  Tobramycin: S <=2      -  Trimethoprim/Sulfamethoxazole: R >2/38      Method Type: TOYA  Organism: Blood Culture PCR (15 Oct 2022 11:22)      -  Blood PCR Panel: NEG      Method Type: PCR    Culture - Blood (collected 12 Oct 2022 20:26)  Source: .Blood None  Gram Stain (13 Oct 2022 22:37):    Growth in aerobic bottle: Gram Negative Rods  Final Report (15 Oct 2022 19:12):    Growth in aerobic bottle: Pseudomonas putida group    Radiology: all available radiological tests reviewed    Advanced directives addressed: full resuscitation

## 2022-10-24 NOTE — PROGRESS NOTE ADULT - ASSESSMENT
73 y/o female h/o obesity, Afib on coumadin, HFpEF, DM2, peripheral neuropathy, PVD, HTN, HLD, chronic b/l legs venous insufficiency, UC was admitted on 10/12 for fever and right lower extremity redness, swelling and pain. Patient called her vascular surgeons ( Dr. Yoo) office, who instructed her to come to the ER. Patient states the pain in her RLE worsened over the past 2 days PTA. She admits to increased fatigue and fever. She denies any SOB, CP, abdominal pain, N/V. In ER she was noted hypotensive and met sepsis criteria, given 30cc/kg, started on phenylephrine and received clindamycin, vancomycin IV and cefepime.     1. Febrile syndrome. Sepsis with PSPU. Extensive RLE cellulitis. B/L LE chronic stasis dermatitis. Obesity. Allergy to PCN.  -BC x 2, urine c/s noted  -s/p vancomycin 1500 mg IV q12h  -on cefepime 2 gm IV q8h # 12  -tolerating abx well so far; no side effects noted  -pseudomonas isolated is poorly sensitive to abx  -continue abx coverage   -repeat BC x 2 are negative to date from 10/16  -complete 14 days IV cefepime 2gmq8h until 10/26/22  -inflammatory markers noted   -elevate legs  -monitor temps  -f/u CBC    2. Other issues:   -care per medicine    d/w Dr. Delatorre

## 2022-10-24 NOTE — PROGRESS NOTE ADULT - SUBJECTIVE AND OBJECTIVE BOX
73 y/o female pmhx Afib on coumadin, HFpEF, DM2, peripheral neuropathy, PVD, HTN, HLD, obesity presented to ER  w/ fevers and right lower extremity pain. Patient called her vascular surgeons ( Dr. Yoo), who instructed her to come to the ER. Patient states the pain in her RLE worsened over the past 2 days. she admits to increased fatigue and fevers. She denies any SOB, CP, abdominal pain, N/V. In ER meet sepsis criteria, given 30cc/kg IVF, broad spectrum abx and pan cultured. Seen by vascular surgery in the ER, ordered imaging of RLE. Post IVF patient remained hypotensive, started on phenylephrine. Admitted to ICU then subsequently transferred to Medicine floor for further management.   Patient was started on IV antibiotic for RLE cellulitis.     Today she has worsening erythema     Vital Signs Last 24 Hrs  T(C): 36.8 (24 Oct 2022 08:19), Max: 37.2 (23 Oct 2022 17:24)  T(F): 98.2 (24 Oct 2022 08:19), Max: 99 (23 Oct 2022 17:24)  HR: 90 (24 Oct 2022 08:19) (74 - 111)  BP: 145/86 (24 Oct 2022 08:19) (128/60 - 145/86)  BP(mean): --  RR: 18 (24 Oct 2022 08:19) (18 - 20)  SpO2: 95% (24 Oct 2022 08:19) (95% - 97%)    Parameters below as of 24 Oct 2022 09:01  Patient On (Oxygen Delivery Method): room air              Constitutional: NAD, laying in bed  HEENT: NC/AT  Back: no tenderness  Respiratory: respirations even and non labored, LCTA  Cardiovascular: S1S2 regular, no murmurs  Abdomen: soft, not tender, not distended, positive BS  Genitourinary: voiding  Rectal: deferred  Musculoskeletal: no muscle tenderness, no joint swelling or tenderness  Extremities: improving RLE edema with persistent  pain. Decreased ROM, right inner ulcer/wound with crust. tender to palpation/ warm to touch- worsening erythema  Neurological: no focal deficits                       Culture - Blood (collected 12 Oct 2022 20:53)  Source: .Blood None  Gram Stain (14 Oct 2022 03:00):    Growth in aerobic bottle: Gram Negative Rods  Final Report (15 Oct 2022 11:22):    Growth in aerobic bottle: Pseudomonas putida group    ***Blood Panel PCR results on this specimen are available    approximately 3 hours after the Gram stain result.***    Gram stain, PCR, and/or culture results may not always    correspond due to difference in methodologies.    ************************************************************    This PCR assay was performed by multiplex PCR. This    Assay tests for 66 bacterial and resistance gene targets.    Please refer to the [facility] test directory    at [email or web address] for details.  Organism: Blood Culture PCR  Pseudomonas putida group (15 Oct 2022 11:22)  Organism: Pseudomonas putida group (15 Oct 2022 11:22)  Organism: Blood Culture PCR (15 Oct 2022 11:22)          RADIOLOGY:  < from: CT Lower Extremity w/ IV Cont, Right (10.12.22 @ 22:34) >  IMPRESSION:    Diffuse stranding and skin thickening in the right and visualized left   lower extremity soft tissue, which may represent edema and/or cellulitis.   No discrete organized fluid collection in the visualized soft tissue. No   obvious bone erosion or periosteal reaction. If there is continued   clinical suspicion for soft tissue/bone infection, additional imaging   (MRI and/or nuclear scan) may be obtained for further evaluation.    < end of copied text >  < from: US Duplex Venous Lower Ext Ltd, Right (10.12.22 @ 22:06) >  IMPRESSION:    The right soleal vein was not visualized. No obvious thrombus in the   visualized right lower extremity deep veins.    < from: Xray Chest 1 View- PORTABLE-Urgent (10.12.22 @ 20:15) >  Impression:    No acute pulmonary disease.          EKG:  < from: 12 Lead ECG (10.12.22 @ 23:27) >  Diagnosis Line Atrial fibrillation with rapid ventricular response  Left axis deviation  Possible Anterior infarct (cited on or before 12-OCT-2022)  Abnormal ECG          ECHO:  < from: TTE Echo Complete w/Doppler (05.14.19 @ 18:07) >  Summary:   1. Left ventricular ejection fraction, by visual estimation, is 50 to   55%.   2. Low normal global left ventricular systolic function.   3. The left ventricular diastolic function could not be assessed in this   study.   4. Moderate to severe left atrial enlargement.   5. Mildly enlarged right atrium.   6. Moderate mitral annular calcification.   7. Thickening and calcification of the anterior and posterior mitral   valve leaflets.   8. Mild mitral valve regurgitation.   9. Peak transaortic gradient equals 30.4 mmHg, mean transaortic gradient   equals 15.8 mmHg, the calculated aortic valve area equals 1.35 cm² by the   continuity equation consistent with moderate aortic stenosis.  10. Estimated pulmonary artery systolic pressure is 36.6 mmHg assuming a   right atrial pressure of 3 mmHg, which is consistent with borderline   pulmonary hypertension.  11. There is no evidence of pericardial effusion.  12. Endocardial visualization was enhanced with intravenous echo contrast.    < from: 12 Lead ECG (10.12.22 @ 23:27) >  Diagnosis Line Atrial fibrillation with rapid ventricular response  Left axis deviation          MEDICATIONS  (STANDING):  budesonide 160 MICROgram(s)/formoterol 4.5 MICROgram(s) Inhaler 2 Puff(s) Inhalation two times a day  cefepime   IVPB 2000 milliGRAM(s) IV Intermittent every 8 hours  cholestyramine Powder (Sugar-Free) 4 Gram(s) Oral two times a day  gabapentin 800 milliGRAM(s) Oral three times a day  glucagon  Injectable 1 milliGRAM(s) IntraMuscular once  insulin lispro (ADMELOG) corrective regimen sliding scale   SubCutaneous Before meals and at bedtime  metoprolol tartrate 25 milliGRAM(s) Oral every 8 hours  morphine ER Tablet 15 milliGRAM(s) Oral every 12 hours  predniSONE   Tablet 10 milliGRAM(s) Oral daily  simvastatin 10 milliGRAM(s) Oral at bedtime  warfarin 7.5 milliGRAM(s) Oral daily

## 2022-10-24 NOTE — PROGRESS NOTE ADULT - ASSESSMENT
71 y/o female with pmhx of afib on coumadin, HFpEF, peripheral neuroapthy, DM II, PVD, HTN, HLD admitted on 11/12 for RLE pain admitted with:      *Septic Shock due to Diffuse RLE Cellulitis/Soft Tissue infection with Associated Pseduomonas bacteremia.  - Blood culture showed pseudomonas cleared now  - start IV steroids might have RA component, pain has not improved; very high inflammatory makers ESR CRP; increase steroids to 40 BID for 24H, decrease to QD  - repeat ESR CRP to see trend  - ID f/up re: additional coverage, Staph?      *Chronic Atrial Fibrillation - on coumadin  - INR >3 hold [facility] repeat INR in am  - metoprolol    *DM II  - ISS  - Diabetic diet   a1c is 5.8  uncontrolled due to steroids  add HAYDEN    * Moderate AS per TTE  PCP Dr Mayer will d/w the case for OP fup    *HTN  *HLD  *Peripheral Neuropathy  On chronic Prednisone (unclear for with pathology)    Per pt prednisone Rx by vascular for the leg pain  left message for  [phone]; case d/w son Francesco

## 2022-10-25 ENCOUNTER — TRANSCRIPTION ENCOUNTER (OUTPATIENT)
Age: 72
End: 2022-10-25

## 2022-10-25 LAB
APTT BLD: 34.3 SEC — SIGNIFICANT CHANGE UP (ref 27.5–35.5)
CRP SERPL-MCNC: 8 MG/L — HIGH
INR BLD: 3.24 RATIO — HIGH (ref 0.88–1.16)
PROTHROM AB SERPL-ACNC: 38 SEC — HIGH (ref 10.5–13.4)
SARS-COV-2 RNA SPEC QL NAA+PROBE: SIGNIFICANT CHANGE UP

## 2022-10-25 PROCEDURE — 99239 HOSP IP/OBS DSCHRG MGMT >30: CPT

## 2022-10-25 RX ORDER — OXYCODONE HYDROCHLORIDE 5 MG/1
1 TABLET ORAL
Qty: 30 | Refills: 0
Start: 2022-10-25 | End: 2022-11-03

## 2022-10-25 RX ORDER — METOPROLOL TARTRATE 50 MG
1.5 TABLET ORAL
Qty: 0 | Refills: 0 | DISCHARGE

## 2022-10-25 RX ORDER — WARFARIN SODIUM 2.5 MG/1
1 TABLET ORAL
Qty: 0 | Refills: 0 | DISCHARGE

## 2022-10-25 RX ORDER — MORPHINE SULFATE 50 MG/1
1 CAPSULE, EXTENDED RELEASE ORAL
Qty: 30 | Refills: 0
Start: 2022-10-25 | End: 2022-11-08

## 2022-10-25 RX ORDER — METOPROLOL TARTRATE 50 MG
1 TABLET ORAL
Qty: 30 | Refills: 0
Start: 2022-10-25 | End: 2022-11-23

## 2022-10-25 RX ADMIN — Medication 25 MILLIGRAM(S): at 05:04

## 2022-10-25 RX ADMIN — BUDESONIDE AND FORMOTEROL FUMARATE DIHYDRATE 2 PUFF(S): 160; 4.5 AEROSOL RESPIRATORY (INHALATION) at 21:15

## 2022-10-25 RX ADMIN — Medication 8: at 16:18

## 2022-10-25 RX ADMIN — Medication 25 MILLIGRAM(S): at 21:46

## 2022-10-25 RX ADMIN — HYDROMORPHONE HYDROCHLORIDE 0.5 MILLIGRAM(S): 2 INJECTION INTRAMUSCULAR; INTRAVENOUS; SUBCUTANEOUS at 08:16

## 2022-10-25 RX ADMIN — HYDROMORPHONE HYDROCHLORIDE 0.5 MILLIGRAM(S): 2 INJECTION INTRAMUSCULAR; INTRAVENOUS; SUBCUTANEOUS at 16:23

## 2022-10-25 RX ADMIN — MORPHINE SULFATE 15 MILLIGRAM(S): 50 CAPSULE, EXTENDED RELEASE ORAL at 23:55

## 2022-10-25 RX ADMIN — CHOLESTYRAMINE 4 GRAM(S): 4 POWDER, FOR SUSPENSION ORAL at 08:01

## 2022-10-25 RX ADMIN — Medication 40 MILLIGRAM(S): at 09:56

## 2022-10-25 RX ADMIN — HYDROMORPHONE HYDROCHLORIDE 0.5 MILLIGRAM(S): 2 INJECTION INTRAMUSCULAR; INTRAVENOUS; SUBCUTANEOUS at 04:29

## 2022-10-25 RX ADMIN — HYDROMORPHONE HYDROCHLORIDE 0.5 MILLIGRAM(S): 2 INJECTION INTRAMUSCULAR; INTRAVENOUS; SUBCUTANEOUS at 04:11

## 2022-10-25 RX ADMIN — BUDESONIDE AND FORMOTEROL FUMARATE DIHYDRATE 2 PUFF(S): 160; 4.5 AEROSOL RESPIRATORY (INHALATION) at 09:37

## 2022-10-25 RX ADMIN — HYDROMORPHONE HYDROCHLORIDE 0.5 MILLIGRAM(S): 2 INJECTION INTRAMUSCULAR; INTRAVENOUS; SUBCUTANEOUS at 16:08

## 2022-10-25 RX ADMIN — MORPHINE SULFATE 15 MILLIGRAM(S): 50 CAPSULE, EXTENDED RELEASE ORAL at 09:56

## 2022-10-25 RX ADMIN — CEFEPIME 100 MILLIGRAM(S): 1 INJECTION, POWDER, FOR SOLUTION INTRAMUSCULAR; INTRAVENOUS at 21:47

## 2022-10-25 RX ADMIN — Medication 1 APPLICATION(S): at 14:50

## 2022-10-25 RX ADMIN — HYDROMORPHONE HYDROCHLORIDE 0.5 MILLIGRAM(S): 2 INJECTION INTRAMUSCULAR; INTRAVENOUS; SUBCUTANEOUS at 08:01

## 2022-10-25 RX ADMIN — GABAPENTIN 800 MILLIGRAM(S): 400 CAPSULE ORAL at 14:50

## 2022-10-25 RX ADMIN — CEFEPIME 100 MILLIGRAM(S): 1 INJECTION, POWDER, FOR SOLUTION INTRAMUSCULAR; INTRAVENOUS at 14:51

## 2022-10-25 RX ADMIN — Medication 1 APPLICATION(S): at 09:55

## 2022-10-25 RX ADMIN — GABAPENTIN 800 MILLIGRAM(S): 400 CAPSULE ORAL at 05:04

## 2022-10-25 RX ADMIN — INSULIN GLARGINE 5 UNIT(S): 100 INJECTION, SOLUTION SUBCUTANEOUS at 21:47

## 2022-10-25 RX ADMIN — HYDROMORPHONE HYDROCHLORIDE 0.5 MILLIGRAM(S): 2 INJECTION INTRAMUSCULAR; INTRAVENOUS; SUBCUTANEOUS at 22:01

## 2022-10-25 RX ADMIN — Medication 4: at 21:55

## 2022-10-25 RX ADMIN — MORPHINE SULFATE 15 MILLIGRAM(S): 50 CAPSULE, EXTENDED RELEASE ORAL at 00:14

## 2022-10-25 RX ADMIN — Medication 2: at 12:02

## 2022-10-25 RX ADMIN — GABAPENTIN 800 MILLIGRAM(S): 400 CAPSULE ORAL at 21:46

## 2022-10-25 RX ADMIN — MORPHINE SULFATE 15 MILLIGRAM(S): 50 CAPSULE, EXTENDED RELEASE ORAL at 01:06

## 2022-10-25 RX ADMIN — MORPHINE SULFATE 15 MILLIGRAM(S): 50 CAPSULE, EXTENDED RELEASE ORAL at 23:11

## 2022-10-25 RX ADMIN — SIMVASTATIN 10 MILLIGRAM(S): 20 TABLET, FILM COATED ORAL at 21:46

## 2022-10-25 RX ADMIN — MORPHINE SULFATE 15 MILLIGRAM(S): 50 CAPSULE, EXTENDED RELEASE ORAL at 10:30

## 2022-10-25 RX ADMIN — Medication 25 MILLIGRAM(S): at 14:51

## 2022-10-25 RX ADMIN — HYDROMORPHONE HYDROCHLORIDE 0.5 MILLIGRAM(S): 2 INJECTION INTRAMUSCULAR; INTRAVENOUS; SUBCUTANEOUS at 21:46

## 2022-10-25 RX ADMIN — CEFEPIME 100 MILLIGRAM(S): 1 INJECTION, POWDER, FOR SOLUTION INTRAMUSCULAR; INTRAVENOUS at 05:05

## 2022-10-25 NOTE — DISCHARGE NOTE PROVIDER - NSDCCPCAREPLAN_GEN_ALL_CORE_FT
PRINCIPAL DISCHARGE DIAGNOSIS  Diagnosis: Cellulitis, leg  Assessment and Plan of Treatment: it will take time to heal; few days prednisone and see Dr Mayer as OP; f/up with vascular also. Do not take coumadin tonight; blood work in am and f/up with Dr Mayer via phone re: when to resume coumadin      SECONDARY DISCHARGE DIAGNOSES  Diagnosis: Sepsis, unspecified organism  Assessment and Plan of Treatment:      PRINCIPAL DISCHARGE DIAGNOSIS  Diagnosis: Cellulitis, leg  Assessment and Plan of Treatment: it will take time to heal; few days prednisone and see Dr Mayer as OP; f/up with vascular also. ; blood work in am and f/up with Dr Mayer via phone re: when to resume coumadin      SECONDARY DISCHARGE DIAGNOSES  Diagnosis: Sepsis, unspecified organism  Assessment and Plan of Treatment:      PRINCIPAL DISCHARGE DIAGNOSIS  Diagnosis: Cellulitis, leg  Assessment and Plan of Treatment: it will take time to heal; few days prednisone and see Dr Mayer as OP; f/up with vascular also. ; blood work in am and f/up with Dr Mayer via phone re: when to resume coumadin  on 10/27 , take coumadin 7.5 for subtherapeutic INR , then resume coumadin 5 mg daily and f/u with Dr Mayer for INR check in 2 days      SECONDARY DISCHARGE DIAGNOSES  Diagnosis: Sepsis, unspecified organism  Assessment and Plan of Treatment:

## 2022-10-25 NOTE — DISCHARGE NOTE PROVIDER - CARE PROVIDER_API CALL
Justo Mayer  CARDIOVASCULAR DISEASE  [address] [facility], [address]  [place] [address]  Phone: [phone]  Fax: [phone]  Follow Up Time: 1-3 days    Denis Breaux)  Surgery; Vascular Surgery  [address] [facility] [place] [address]  Phone: [phone]  Fax: [phone]  Follow Up Time: 1 week

## 2022-10-25 NOTE — ADVANCED PRACTICE NURSE CONSULT - RECOMMEDATIONS
1)Continue to Elevate heels off of Mattress  2)Continue to Turn and position every 2 Hours  3)Consult Dietitian   4)No Adhesives to lower extremity   5)Right Medial Lower Extremity Wound: Apply Silvadene to wound bed and cover with a nonstick telfa and fernando daily change. Apply Dimethicone lotion daily to lower extremity.   6)Follow up with Wound Care on discharge Dr Yoo.

## 2022-10-25 NOTE — DISCHARGE NOTE PROVIDER - PROVIDER TOKENS
PROVIDER:[TOKEN:[[id number]],FOLLOWUP:[1-3 days]],PROVIDER:[TOKEN:[[id number]],FOLLOWUP:[1 week]]

## 2022-10-25 NOTE — ADVANCED PRACTICE NURSE CONSULT - ASSESSMENT
This is a 73 y/o female admitted on 10/12/2022 for increasing fatigue and worsening pain and redness to right lower extremity. Patient reports she spoke with Dr Yoo and reported for her to go the ER. PMHx:  Afib on coumadin, HFpEF, DM2, peripheral neuropathy, PVD, HTN, HLD. CHF, Dyspna.    Re-Assessed patient in [address] [place] [address] and educated as to why i was consulted. She agreed for me to assess her.   Noted right lower extremity with improved erythema with Hemosiderin staining     -Right medial Extremity now scabbed .   -Right Medial Distal Extremity 2.1cm x 2cm x 0.3cm  wound bed is with 100% yellow firmly attached slough. Applied Silvadene to wound.   -Right Lateral Lower Extremity now scabbed   patient is being followed by infection control and on antibiotics. Keep heels elevated off mattress. I wound still not recommend placing any dressing with adhesive. Patient reports having Silvadene to wounds recommend to continue.   
This is a 71 y/o female admitted on 10/12/2022 for increasing fatigue and worsening pain and redness to right lower extremity. Patient reports she spoke with Dr Yoo and reported for her to go the ER. PMHx:  Afib on coumadin, HFpEF, DM2, peripheral neuropathy, PVD, HTN, HLD. CHF, Dyspna.    Assessed patient in ICU and educated as to why i was consulted. She agreed for me to assess her.   Noted right lower extremity with profound erythema with purple hue, taught skin and 2 wounds.   -Right medial Extremity 2cm x 2.5cm x 0.3cm wound bed is consistent with the erythema of the leg and noted  25% eschar. No drainage noted and no dressings.   -Right Medial Distal Extremity 2.1cm x 2cm x 0.3cm  wound bed is consistent with the erythema of the leg and noted  50% eschar. No drainage noted and no dressings  -Right Lateral Lower Extremity 2.5cm x 3cm x 0.3cm  wound bed is consistent with the erythema of the leg and noted  50% eschar. No drainage noted and no dressings  patient is being followed by infection control and on antibiotics. Keep heels elevated off mattress. Because of the profound erythema and tautness of her skin, i wound not recommend placing and dressing with adhesive. Patient reports having Silvadene to wounds daily however i would like to collaborate with Vascular before placing anything topical. I did note that the draw marking where the redness extended has receded.   When erythema improves, i recommend compression and discuss topical with Vascular MD.

## 2022-10-25 NOTE — DISCHARGE NOTE PROVIDER - NSDCMRMEDTOKEN_GEN_ALL_CORE_FT
cholestyramine 4 g/5 g oral powder for reconstitution: 1 packet(s) orally 2 times a day  ezetimibe 10 mg oral tablet: 1 tab(s) orally once a day (in the evening)  gabapentin 800 mg oral tablet: 1 tab(s) orally 3 times a day  metFORMIN 1000 mg oral tablet: 1 tab(s) orally 2 times a day  montelukast 10 mg oral tablet: 1 tab(s) orally once a day (at bedtime)  morphine 15 mg/8 to 12 hr oral tablet, extended release: 1 tab(s) orally every 12 hours MDD:2  oxyCODONE 5 mg oral tablet: 1 tab(s) orally every 8 hours, As Needed -Moderate Pain (4 - 6) MDD:3  pravastatin 10 mg oral tablet: 1 tab(s) orally once a day (in the evening)  predniSONE 10 mg oral tablet: 2 tab(s) orally once a day for 3 days  1 tb po qd after  f/up with Dr Yoo re: further dose adjutments  Toprol-XL 50 mg oral tablet, extended release: 1 tab(s) orally once a day   verapamil 120 mg oral tablet: 1 tab(s) orally 2 times a day  warfarin 5 mg oral tablet: 1 tab(s) orally once a day (in the evening) when OK   &#x27;ed by Dr. Mayer.  zolpidem 10 mg oral tablet: 1 tab(s) orally once a day (at bedtime)   cholestyramine 4 g/5 g oral powder for reconstitution: 1 packet(s) orally 2 times a day  ezetimibe 10 mg oral tablet: 1 tab(s) orally once a day (in the evening)  gabapentin 800 mg oral tablet: 1 tab(s) orally 3 times a day  metFORMIN 1000 mg oral tablet: 1 tab(s) orally 2 times a day  montelukast 10 mg oral tablet: 1 tab(s) orally once a day (at bedtime)  morphine 15 mg/8 to 12 hr oral tablet, extended release: 1 tab(s) orally every 12 hours MDD:2  oxyCODONE 5 mg oral tablet: 1 tab(s) orally every 8 hours, As Needed -Moderate Pain (4 - 6) MDD:3  pravastatin 10 mg oral tablet: 1 tab(s) orally once a day (in the evening)  predniSONE 10 mg oral tablet: 2 tab(s) orally once a day for 3 days  1 tb po qd after  f/up with Dr Yoo re: further dose adjutments  Silvadene 1% topical cream: Apply topically to affected area 2 times a day   Toprol-XL 50 mg oral tablet, extended release: 1 tab(s) orally once a day   verapamil 120 mg oral tablet: 1 tab(s) orally 2 times a day  warfarin 5 mg oral tablet: 1 tab(s) orally once a day (in the evening) when OK   &#x27;ed by Dr. Mayer.  zolpidem 10 mg oral tablet: 1 tab(s) orally once a day (at bedtime)   cholestyramine 4 g/5 g oral powder for reconstitution: 1 packet(s) orally 2 times a day  Coumadin 2.5 mg oral tablet: 1 tab(s) orally once a day  ezetimibe 10 mg oral tablet: 1 tab(s) orally once a day (in the evening)  gabapentin 800 mg oral tablet: 1 tab(s) orally 3 times a day  metFORMIN 1000 mg oral tablet: 1 tab(s) orally 2 times a day  montelukast 10 mg oral tablet: 1 tab(s) orally once a day (at bedtime)  morphine 15 mg/8 to 12 hr oral tablet, extended release: 1 tab(s) orally every 12 hours MDD:2  oxyCODONE 5 mg oral tablet: 1 tab(s) orally every 8 hours, As Needed -Moderate Pain (4 - 6) MDD:3  pravastatin 10 mg oral tablet: 1 tab(s) orally once a day (in the evening)  predniSONE 10 mg oral tablet: 2 tab(s) orally once a day for 3 days  1 tb po qd after  f/up with Dr Yoo re: further dose adjutments  Silvadene 1% topical cream: Apply topically to affected area 2 times a day   Toprol-XL 50 mg oral tablet, extended release: 1 tab(s) orally once a day   verapamil 120 mg oral tablet: 1 tab(s) orally 2 times a day  warfarin 5 mg oral tablet: 1 tab(s) orally once a day (in the evening) when OK   &#x27;ed by Dr. Mayer.  zolpidem 10 mg oral tablet: 1 tab(s) orally once a day (at bedtime)   cholestyramine 4 g/5 g oral powder for reconstitution: 1 packet(s) orally 2 times a day  Coumadin 2.5 mg oral tablet: 1 tab(s) orally once a day on 10/27  ezetimibe 10 mg oral tablet: 1 tab(s) orally once a day (in the evening)  gabapentin 800 mg oral tablet: 1 tab(s) orally 3 times a day  metFORMIN 1000 mg oral tablet: 1 tab(s) orally 2 times a day  montelukast 10 mg oral tablet: 1 tab(s) orally once a day (at bedtime)  morphine 15 mg/8 to 12 hr oral tablet, extended release: 1 tab(s) orally every 12 hours MDD:2  oxyCODONE 5 mg oral tablet: 1 tab(s) orally every 8 hours, As Needed -Moderate Pain (4 - 6) MDD:3  pravastatin 10 mg oral tablet: 1 tab(s) orally once a day (in the evening)  predniSONE 10 mg oral tablet: 2 tab(s) orally once a day for 3 days  1 tb po qd after  f/up with Dr Yoo re: further dose adjutments  Silvadene 1% topical cream: Apply topically to affected area 2 times a day   Toprol-XL 50 mg oral tablet, extended release: 1 tab(s) orally once a day   verapamil 120 mg oral tablet: 1 tab(s) orally 2 times a day  warfarin 5 mg oral tablet: 1 tab(s) orally once a day (in the evening) when OK   &#x27;ed by Dr. Mayer.  zolpidem 10 mg oral tablet: 1 tab(s) orally once a day (at bedtime)

## 2022-10-25 NOTE — DISCHARGE NOTE PROVIDER - HOSPITAL COURSE
73 y/o female pmhx Afib on coumadin, HFpEF, DM2, peripheral neuropathy, PVD, HTN, HLD, obesity presented to ER  w/ fevers and right lower extremity pain. Patient called her vascular surgeons ( Dr. Yoo), who instructed her to come to the ER. Patient states the pain in her RLE worsened over the past 2 days. she admits to increased fatigue and fevers. She denies any SOB, CP, abdominal pain, N/V. In ER meet sepsis criteria, given 30cc/kg IVF, broad spectrum abx and pan cultured. Seen by vascular surgery in the ER, ordered imaging of RLE. Post IVF patient remained hypotensive, started on phenylephrine. Admitted to ICU then subsequently transferred to Medicine floor for further management.   Patient was started on IV antibiotic for RLE cellulitis.     Vital Signs Last 24 Hrs  T(C): 36.2 (25 Oct 2022 08:00), Max: 36.9 (24 Oct 2022 15:09)  T(F): 97.2 (25 Oct 2022 08:00), Max: 98.4 (24 Oct 2022 15:09)  HR: 80 (25 Oct 2022 09:38) (80 - 116)  BP: 147/76 (25 Oct 2022 08:00) (122/63 - 147/76)  BP(mean): --  RR: 18 (25 Oct 2022 08:00) (18 - 18)  SpO2: 96% (25 Oct 2022 09:38) (94% - 96%)    Parameters below as of 25 Oct 2022 09:38  Patient On (Oxygen Delivery Method): room air            *Septic Shock due to Diffuse RLE Cellulitis/Soft Tissue infection with Associated Pseduomonas bacteremia.  - Blood culture showed pseudomonas cleared now  - start IV steroids might have RA component, pain has not improved; very high inflammatory makers ESR CRP; increase steroids to 40 BID for 24H, decrease to QD  - repeat ESR CRP markedly decreased after IV roids started; dc home on tapering I do believe there is an inflammatory component       *Chronic Atrial Fibrillation - on coumadin  - INR >3 hold [facility] repeat INR as OP Dr Mayer will f/up  - metoprolol    *DM II  - ISS  - Diabetic diet   a1c is 5.8  uncontrolled due to steroids  add HAYDEN    * Moderate AS per TTE  PCP Dr Mayer will d/w the case for OP fup    *HTN  *HLD  *Peripheral Neuropathy  On chronic Prednisone (unclear for with pathology)    Per pt prednisone Rx by vascular for the leg pain  case d/w son Francesco    Case d/w PCP Dr Mayer               71 y/o female pmhx Afib on coumadin, HFpEF, DM2, peripheral neuropathy, PVD, HTN, HLD, obesity presented to ER  w/ fevers and right lower extremity pain. Patient called her vascular surgeons ( Dr. Yoo), who instructed her to come to the ER. Patient states the pain in her RLE worsened over the past 2 days. she admits to increased fatigue and fevers. She denies any SOB, CP, abdominal pain, N/V. In ER meet sepsis criteria, given 30cc/kg IVF, broad spectrum abx and pan cultured. Seen by vascular surgery in the ER, ordered imaging of RLE. Post IVF patient remained hypotensive, started on phenylephrine. Admitted to ICU then subsequently transferred to Medicine floor for further management.   Patient was started on IV antibiotic for RLE cellulitis. 71 y/o female with pmhx of afib on coumadin, HFpEF, peripheral neuroapthy, DM II, PVD, HTN, HLD admitted on 11/12 for RLE pain admitted with:      *Septic Shock due to Diffuse RLE Cellulitis/Soft Tissue infection with Associated Pseduomonas bacteremia.  - Blood culture showed pseudomonas cleared now  - was treated with  IV steroids might have RA component, pain has not improved; very high inflammatory makers ESR CRP; increase steroids to 40 BID for 24H, decrease to QD - > will taper to prednisone taper to her chronic dose. Per pt prednisone Rx by vascular for the leg pain  - repeat ESR CRP to see trend  repeat BC x 2 are negative to date from 10/16  -complete 14 days IV cefepime 2gmq8h until 10/26/22  cleared by ID for discharge       *Chronic Atrial Fibrillation - on coumadin  - coumadin resumed.  goal inr 2-3   - metoprolol    *DM II  - ISS  - Diabetic diet   a1c is 5.8  uncontrolled due to steroids  add HAYDEN    * Moderate AS per [facility] Dr Mayer will d/w the case for OP fup    *HTN  *HLD  *Peripheral Neuropathy  On chronic Prednisone (unclear for with pathology)    Per pt prednisone Rx by vascular for the leg pain    10/27 - improving erythema  and leg pain .  no fever or chills  Constitutional: NAD, laying in bed  HEENT: NC/AT  Back: no tenderness  Respiratory: respirations even and non labored, LCTA  Cardiovascular: S1S2 regular, no murmurs  Abdomen: soft, not tender, not distended, positive BS  Genitourinary: voiding  Rectal: deferred  Musculoskeletal: no muscle tenderness, no joint swelling or tenderness  Extremities: improving RLE edema with improving  pain. Decreased ROM, right inner ulcer/wound with crust. no longer tender to palpation   Neurological: no focal deficits    discharge time 47mins                   71 y/o female pmhx Afib on coumadin, HFpEF, DM2, peripheral neuropathy, PVD, HTN, HLD, obesity presented to ER  w/ fevers and right lower extremity pain. Patient called her vascular surgeons ( Dr. Yoo), who instructed her to come to the ER. Patient states the pain in her RLE worsened over the past 2 days. she admits to increased fatigue and fevers. She denies any SOB, CP, abdominal pain, N/V. In ER meet sepsis criteria, given 30cc/kg IVF, broad spectrum abx and pan cultured. Seen by vascular surgery in the ER, ordered imaging of RLE. Post IVF patient remained hypotensive, started on phenylephrine. Admitted to ICU then subsequently transferred to Medicine floor for further management.   Patient was started on IV antibiotic for RLE cellulitis. 71 y/o female with pmhx of afib on coumadin, HFpEF, peripheral neuroapthy, DM II, PVD, HTN, HLD admitted on 11/12 for RLE pain admitted with:      *Septic Shock due to Diffuse RLE Cellulitis/Soft Tissue infection with Associated Pseduomonas bacteremia.  - Blood culture showed pseudomonas cleared now  - was treated with  IV steroids might have RA component, pain has not improved; very high inflammatory makers ESR CRP; increase steroids to 40 BID for 24H, decrease to QD - > will taper to prednisone taper to her chronic dose. Per pt prednisone Rx by vascular for the leg pain  - repeat ESR CRP to see trend  repeat BC x 2 are negative to date from 10/16  -complete 14 days IV cefepime 2gmq8h until 10/26/22  cleared by ID for discharge       *Chronic Atrial Fibrillation - on coumadin  - coumadin resumed.  goal inr 2-3   on 10/27 , take coumadin 7.5 for subtherapeutic INR , then resume coumadin 5 mg daily and f/u with Dr Mayer  - metoprolol    *DM II  - ISS  - Diabetic diet   a1c is 5.8  uncontrolled due to steroids  add HAYDEN    * Moderate AS per [facility] Dr Mayer will d/w the case for OP fup    *HTN  *HLD  *Peripheral Neuropathy  On chronic Prednisone (unclear for with pathology)    Per pt prednisone Rx by vascular for the leg pain    10/27 - improving erythema  and leg pain .  no fever or chills  Constitutional: NAD, laying in bed  HEENT: NC/AT  Back: no tenderness  Respiratory: respirations even and non labored, LCTA  Cardiovascular: S1S2 regular, no murmurs  Abdomen: soft, not tender, not distended, positive BS  Genitourinary: voiding  Rectal: deferred  Musculoskeletal: no muscle tenderness, no joint swelling or tenderness  Extremities: improving RLE edema with improving  pain. Decreased ROM, right inner ulcer/wound with crust. no longer tender to palpation   Neurological: no focal deficits    discharge time 47mins

## 2022-10-26 LAB
APTT BLD: 31.2 SEC — SIGNIFICANT CHANGE UP (ref 27.5–35.5)
INR BLD: 2.09 RATIO — HIGH (ref 0.88–1.16)
PROTHROM AB SERPL-ACNC: 24.4 SEC — HIGH (ref 10.5–13.4)

## 2022-10-26 PROCEDURE — 99232 SBSQ HOSP IP/OBS MODERATE 35: CPT

## 2022-10-26 RX ORDER — WARFARIN SODIUM 2.5 MG/1
5 TABLET ORAL DAILY
Refills: 0 | Status: DISCONTINUED | OUTPATIENT
Start: 2022-10-26 | End: 2022-10-27

## 2022-10-26 RX ADMIN — SIMVASTATIN 10 MILLIGRAM(S): 20 TABLET, FILM COATED ORAL at 22:04

## 2022-10-26 RX ADMIN — HYDROMORPHONE HYDROCHLORIDE 0.5 MILLIGRAM(S): 2 INJECTION INTRAMUSCULAR; INTRAVENOUS; SUBCUTANEOUS at 06:26

## 2022-10-26 RX ADMIN — Medication 40 MILLIGRAM(S): at 10:49

## 2022-10-26 RX ADMIN — WARFARIN SODIUM 5 MILLIGRAM(S): 2.5 TABLET ORAL at 22:03

## 2022-10-26 RX ADMIN — Medication 25 MILLIGRAM(S): at 22:04

## 2022-10-26 RX ADMIN — BUDESONIDE AND FORMOTEROL FUMARATE DIHYDRATE 2 PUFF(S): 160; 4.5 AEROSOL RESPIRATORY (INHALATION) at 09:10

## 2022-10-26 RX ADMIN — Medication 6: at 17:24

## 2022-10-26 RX ADMIN — HYDROMORPHONE HYDROCHLORIDE 0.5 MILLIGRAM(S): 2 INJECTION INTRAMUSCULAR; INTRAVENOUS; SUBCUTANEOUS at 23:30

## 2022-10-26 RX ADMIN — HYDROMORPHONE HYDROCHLORIDE 0.5 MILLIGRAM(S): 2 INJECTION INTRAMUSCULAR; INTRAVENOUS; SUBCUTANEOUS at 02:32

## 2022-10-26 RX ADMIN — Medication 4: at 22:02

## 2022-10-26 RX ADMIN — MORPHINE SULFATE 15 MILLIGRAM(S): 50 CAPSULE, EXTENDED RELEASE ORAL at 10:59

## 2022-10-26 RX ADMIN — CEFEPIME 100 MILLIGRAM(S): 1 INJECTION, POWDER, FOR SOLUTION INTRAMUSCULAR; INTRAVENOUS at 22:02

## 2022-10-26 RX ADMIN — HYDROMORPHONE HYDROCHLORIDE 0.5 MILLIGRAM(S): 2 INJECTION INTRAMUSCULAR; INTRAVENOUS; SUBCUTANEOUS at 13:07

## 2022-10-26 RX ADMIN — GABAPENTIN 800 MILLIGRAM(S): 400 CAPSULE ORAL at 06:26

## 2022-10-26 RX ADMIN — BUDESONIDE AND FORMOTEROL FUMARATE DIHYDRATE 2 PUFF(S): 160; 4.5 AEROSOL RESPIRATORY (INHALATION) at 20:22

## 2022-10-26 RX ADMIN — Medication 1 APPLICATION(S): at 10:59

## 2022-10-26 RX ADMIN — Medication 25 MILLIGRAM(S): at 15:36

## 2022-10-26 RX ADMIN — INSULIN GLARGINE 5 UNIT(S): 100 INJECTION, SOLUTION SUBCUTANEOUS at 22:03

## 2022-10-26 RX ADMIN — CEFEPIME 100 MILLIGRAM(S): 1 INJECTION, POWDER, FOR SOLUTION INTRAMUSCULAR; INTRAVENOUS at 06:26

## 2022-10-26 RX ADMIN — MORPHINE SULFATE 15 MILLIGRAM(S): 50 CAPSULE, EXTENDED RELEASE ORAL at 22:03

## 2022-10-26 RX ADMIN — HYDROMORPHONE HYDROCHLORIDE 0.5 MILLIGRAM(S): 2 INJECTION INTRAMUSCULAR; INTRAVENOUS; SUBCUTANEOUS at 02:19

## 2022-10-26 RX ADMIN — HYDROMORPHONE HYDROCHLORIDE 0.5 MILLIGRAM(S): 2 INJECTION INTRAMUSCULAR; INTRAVENOUS; SUBCUTANEOUS at 17:25

## 2022-10-26 RX ADMIN — HYDROMORPHONE HYDROCHLORIDE 0.5 MILLIGRAM(S): 2 INJECTION INTRAMUSCULAR; INTRAVENOUS; SUBCUTANEOUS at 06:39

## 2022-10-26 RX ADMIN — GABAPENTIN 800 MILLIGRAM(S): 400 CAPSULE ORAL at 15:36

## 2022-10-26 RX ADMIN — MORPHINE SULFATE 15 MILLIGRAM(S): 50 CAPSULE, EXTENDED RELEASE ORAL at 23:02

## 2022-10-26 RX ADMIN — Medication 25 MILLIGRAM(S): at 06:26

## 2022-10-26 RX ADMIN — OXYCODONE HYDROCHLORIDE 5 MILLIGRAM(S): 5 TABLET ORAL at 00:26

## 2022-10-26 RX ADMIN — HYDROMORPHONE HYDROCHLORIDE 0.5 MILLIGRAM(S): 2 INJECTION INTRAMUSCULAR; INTRAVENOUS; SUBCUTANEOUS at 12:23

## 2022-10-26 RX ADMIN — HYDROMORPHONE HYDROCHLORIDE 0.5 MILLIGRAM(S): 2 INJECTION INTRAMUSCULAR; INTRAVENOUS; SUBCUTANEOUS at 18:09

## 2022-10-26 RX ADMIN — OXYCODONE HYDROCHLORIDE 5 MILLIGRAM(S): 5 TABLET ORAL at 01:01

## 2022-10-26 RX ADMIN — GABAPENTIN 800 MILLIGRAM(S): 400 CAPSULE ORAL at 22:03

## 2022-10-26 RX ADMIN — CHOLESTYRAMINE 4 GRAM(S): 4 POWDER, FOR SUSPENSION ORAL at 10:49

## 2022-10-26 RX ADMIN — CEFEPIME 100 MILLIGRAM(S): 1 INJECTION, POWDER, FOR SOLUTION INTRAMUSCULAR; INTRAVENOUS at 15:36

## 2022-10-26 NOTE — PROGRESS NOTE ADULT - SUBJECTIVE AND OBJECTIVE BOX
73 y/o female pmhx Afib on coumadin, HFpEF, DM2, peripheral neuropathy, PVD, HTN, HLD, obesity presented to ER  w/ fevers and right lower extremity pain. Patient called her vascular surgeons ( Dr. Yoo), who instructed her to come to the ER. Patient states the pain in her RLE worsened over the past 2 days. she admits to increased fatigue and fevers. She denies any SOB, CP, abdominal pain, N/V. In ER meet sepsis criteria, given 30cc/kg IVF, broad spectrum abx and pan cultured. Seen by vascular surgery in the ER, ordered imaging of RLE. Post IVF patient remained hypotensive, started on phenylephrine. Admitted to ICU then subsequently transferred to Medicine floor for further management.   Patient was started on IV antibiotic for RLE cellulitis.     10/26 - improving erythema but states she doesnt want to leave til friday.  no fever or chills    Vital Signs Last 24 Hrs  T(C): 37 (26 Oct 2022 08:05), Max: 37.1 (25 Oct 2022 21:39)  T(F): 98.6 (26 Oct 2022 08:05), Max: 98.8 (25 Oct 2022 21:39)  HR: 90 (26 Oct 2022 12:13) (76 - 112)  BP: 118/69 (26 Oct 2022 12:13) (118/69 - 150/87)  BP(mean): --  RR: 18 (26 Oct 2022 12:13) (18 - 18)  SpO2: 98% (26 Oct 2022 12:13) (95% - 98%)    Parameters below as of 26 Oct 2022 12:13  Patient On (Oxygen Delivery Method): room air        Constitutional: NAD, laying in bed  HEENT: NC/AT  Back: no tenderness  Respiratory: respirations even and non labored, LCTA  Cardiovascular: S1S2 regular, no murmurs  Abdomen: soft, not tender, not distended, positive BS  Genitourinary: voiding  Rectal: deferred  Musculoskeletal: no muscle tenderness, no joint swelling or tenderness  Extremities: improving RLE edema with persistent  pain. Decreased ROM, right inner ulcer/wound with crust. no longer tender to palpation   Neurological: no focal deficits                       Culture - Blood (collected 12 Oct 2022 20:53)  Source: .Blood None  Gram Stain (14 Oct 2022 03:00):    Growth in aerobic bottle: Gram Negative Rods  Final Report (15 Oct 2022 11:22):    Growth in aerobic bottle: Pseudomonas putida group    ***Blood Panel PCR results on this specimen are available    approximately 3 hours after the Gram stain result.***    Gram stain, PCR, and/or culture results may not always    correspond due to difference in methodologies.    ************************************************************    This PCR assay was performed by multiplex PCR. This    Assay tests for 66 bacterial and resistance gene targets.    Please refer to the [facility] test directory    at [email or web address] for details.  Organism: Blood Culture PCR  Pseudomonas putida group (15 Oct 2022 11:22)  Organism: Pseudomonas putida group (15 Oct 2022 11:22)  Organism: Blood Culture PCR (15 Oct 2022 11:22)          RADIOLOGY:  < from: CT Lower Extremity w/ IV Cont, Right (10.12.22 @ 22:34) >  IMPRESSION:    Diffuse stranding and skin thickening in the right and visualized left   lower extremity soft tissue, which may represent edema and/or cellulitis.   No discrete organized fluid collection in the visualized soft tissue. No   obvious bone erosion or periosteal reaction. If there is continued   clinical suspicion for soft tissue/bone infection, additional imaging   (MRI and/or nuclear scan) may be obtained for further evaluation.    < end of copied text >  < from: US Duplex Venous Lower Ext Ltd, Right (10.12.22 @ 22:06) >  IMPRESSION:    The right soleal vein was not visualized. No obvious thrombus in the   visualized right lower extremity deep veins.    < from: Xray Chest 1 View- PORTABLE-Urgent (10.12.22 @ 20:15) >  Impression:    No acute pulmonary disease.          EKG:  < from: 12 Lead ECG (10.12.22 @ 23:27) >  Diagnosis Line Atrial fibrillation with rapid ventricular response  Left axis deviation  Possible Anterior infarct (cited on or before 12-OCT-2022)  Abnormal ECG          ECHO:  < from: TTE Echo Complete w/Doppler (05.14.19 @ 18:07) >  Summary:   1. Left ventricular ejection fraction, by visual estimation, is 50 to   55%.   2. Low normal global left ventricular systolic function.   3. The left ventricular diastolic function could not be assessed in this   study.   4. Moderate to severe left atrial enlargement.   5. Mildly enlarged right atrium.   6. Moderate mitral annular calcification.   7. Thickening and calcification of the anterior and posterior mitral   valve leaflets.   8. Mild mitral valve regurgitation.   9. Peak transaortic gradient equals 30.4 mmHg, mean transaortic gradient   equals 15.8 mmHg, the calculated aortic valve area equals 1.35 cm² by the   continuity equation consistent with moderate aortic stenosis.  10. Estimated pulmonary artery systolic pressure is 36.6 mmHg assuming a   right atrial pressure of 3 mmHg, which is consistent with borderline   pulmonary hypertension.  11. There is no evidence of pericardial effusion.  12. Endocardial visualization was enhanced with intravenous echo contrast.    < from: 12 Lead ECG (10.12.22 @ 23:27) >  Diagnosis Line Atrial fibrillation with rapid ventricular response  Left axis deviation

## 2022-10-26 NOTE — PROGRESS NOTE ADULT - ASSESSMENT
73 y/o female with pmhx of afib on coumadin, HFpEF, peripheral neuroapthy, DM II, PVD, HTN, HLD admitted on 11/12 for RLE pain admitted with:      *Septic Shock due to Diffuse RLE Cellulitis/Soft Tissue infection with Associated Pseduomonas bacteremia.  - Blood culture showed pseudomonas cleared now  - start IV steroids might have RA component, pain has not improved; very high inflammatory makers ESR CRP; increase steroids to 40 BID for 24H, decrease to QD - > will taper to prednisone taper to her chronic dose. Per pt prednisone Rx by vascular for the leg pain  - repeat ESR CRP to see trend  - ID f/up re: additional coverage, Staph?      *Chronic Atrial Fibrillation - on coumadin  - coumadin resumed.  goal inr 2-3   - metoprolol    *DM II  - ISS  - Diabetic diet   a1c is 5.8  uncontrolled due to steroids  add HAYDEN    * Moderate AS per TTE  PCP Dr Mayer will d/w the case for OP fup    *HTN  *HLD  *Peripheral Neuropathy  On chronic Prednisone (unclear for with pathology)    Per pt prednisone Rx by vascular for the leg pain

## 2022-10-27 VITALS
SYSTOLIC BLOOD PRESSURE: 151 MMHG | HEART RATE: 81 BPM | RESPIRATION RATE: 18 BRPM | DIASTOLIC BLOOD PRESSURE: 93 MMHG | TEMPERATURE: 98 F | OXYGEN SATURATION: 96 %

## 2022-10-27 LAB
APTT BLD: 28.5 SEC — SIGNIFICANT CHANGE UP (ref 27.5–35.5)
INR BLD: 1.49 RATIO — HIGH (ref 0.88–1.16)
PROTHROM AB SERPL-ACNC: 17.3 SEC — HIGH (ref 10.5–13.4)

## 2022-10-27 PROCEDURE — 99239 HOSP IP/OBS DSCHRG MGMT >30: CPT

## 2022-10-27 RX ORDER — WARFARIN SODIUM 2.5 MG/1
1 TABLET ORAL
Qty: 1 | Refills: 0
Start: 2022-10-27 | End: 2022-10-27

## 2022-10-27 RX ORDER — WARFARIN SODIUM 2.5 MG/1
1 TABLET ORAL
Qty: 0 | Refills: 0 | DISCHARGE

## 2022-10-27 RX ADMIN — HYDROMORPHONE HYDROCHLORIDE 0.5 MILLIGRAM(S): 2 INJECTION INTRAMUSCULAR; INTRAVENOUS; SUBCUTANEOUS at 04:29

## 2022-10-27 RX ADMIN — BUDESONIDE AND FORMOTEROL FUMARATE DIHYDRATE 2 PUFF(S): 160; 4.5 AEROSOL RESPIRATORY (INHALATION) at 09:15

## 2022-10-27 RX ADMIN — MORPHINE SULFATE 15 MILLIGRAM(S): 50 CAPSULE, EXTENDED RELEASE ORAL at 10:29

## 2022-10-27 RX ADMIN — Medication 40 MILLIGRAM(S): at 10:30

## 2022-10-27 RX ADMIN — Medication 25 MILLIGRAM(S): at 05:31

## 2022-10-27 RX ADMIN — HYDROMORPHONE HYDROCHLORIDE 0.5 MILLIGRAM(S): 2 INJECTION INTRAMUSCULAR; INTRAVENOUS; SUBCUTANEOUS at 03:32

## 2022-10-27 RX ADMIN — GABAPENTIN 800 MILLIGRAM(S): 400 CAPSULE ORAL at 05:32

## 2022-10-27 RX ADMIN — HYDROMORPHONE HYDROCHLORIDE 0.5 MILLIGRAM(S): 2 INJECTION INTRAMUSCULAR; INTRAVENOUS; SUBCUTANEOUS at 00:27

## 2022-10-27 RX ADMIN — MORPHINE SULFATE 15 MILLIGRAM(S): 50 CAPSULE, EXTENDED RELEASE ORAL at 12:36

## 2022-10-27 RX ADMIN — Medication 1 APPLICATION(S): at 10:30

## 2022-10-27 NOTE — PROGRESS NOTE ADULT - SUBJECTIVE AND OBJECTIVE BOX
73 y/o female pmhx Afib on coumadin, HFpEF, DM2, peripheral neuropathy, PVD, HTN, HLD, obesity presented to ER  w/ fevers and right lower extremity pain. Patient called her vascular surgeons ( Dr. Yoo), who instructed her to come to the ER. Patient states the pain in her RLE worsened over the past 2 days. she admits to increased fatigue and fevers. She denies any SOB, CP, abdominal pain, N/V. In ER meet sepsis criteria, given 30cc/kg IVF, broad spectrum abx and pan cultured. Seen by vascular surgery in the ER, ordered imaging of RLE. Post IVF patient remained hypotensive, started on phenylephrine. Admitted to ICU then subsequently transferred to Medicine floor for further management.   Patient was started on IV antibiotic for RLE cellulitis.     10/27 - improving erythema  and leg pain .  no fever or chills  Constitutional: NAD, laying in bed  HEENT: NC/AT  Back: no tenderness  Respiratory: respirations even and non labored, LCTA  Cardiovascular: S1S2 regular, no murmurs  Abdomen: soft, not tender, not distended, positive BS  Genitourinary: voiding  Rectal: deferred  Musculoskeletal: no muscle tenderness, no joint swelling or tenderness  Extremities: improving RLE edema with improving  pain. Decreased ROM, right inner ulcer/wound with crust. no longer tender to palpation   Neurological: no focal deficits      PHYSICAL EXAM:    Daily     Daily     ICU Vital Signs Last 24 Hrs  T(C): 36.6 (27 Oct 2022 08:08), Max: 37 (26 Oct 2022 22:15)  T(F): 97.9 (27 Oct 2022 08:08), Max: 98.6 (26 Oct 2022 22:15)  HR: 81 (27 Oct 2022 08:08) (81 - 113)  BP: 151/93 (27 Oct 2022 08:08) (121/75 - 164/77)  BP(mean): --  ABP: --  ABP(mean): --  RR: 18 (27 Oct 2022 08:08) (18 - 18)  SpO2: 96% (27 Oct 2022 08:08) (95% - 96%)    O2 Parameters below as of 27 Oct 2022 08:08  Patient On (Oxygen Delivery Method): room air            PT/INR - ( 27 Oct 2022 06:51 )   PT: 17.3 sec;   INR: 1.49 ratio         PTT - ( 27 Oct 2022 06:51 )  PTT:28.5 sec                  MEDICATIONS  (STANDING):  budesonide 160 MICROgram(s)/formoterol 4.5 MICROgram(s) Inhaler 2 Puff(s) Inhalation two times a day  cholestyramine Powder (Sugar-Free) 4 Gram(s) Oral two times a day  dextrose 5%. 1000 milliLiter(s) (50 mL/Hr) IV Continuous <Continuous>  dextrose 5%. 1000 milliLiter(s) (100 mL/Hr) IV Continuous <Continuous>  dextrose 50% Injectable 25 Gram(s) IV Push once  dextrose 50% Injectable 12.5 Gram(s) IV Push once  dextrose 50% Injectable 25 Gram(s) IV Push once  gabapentin 800 milliGRAM(s) Oral three times a day  glucagon  Injectable 1 milliGRAM(s) IntraMuscular once  insulin glargine Injectable (LANTUS) 5 Unit(s) SubCutaneous at bedtime  insulin lispro (ADMELOG) corrective regimen sliding scale   SubCutaneous Before meals and at bedtime  methylPREDNISolone sodium succinate Injectable 40 milliGRAM(s) IV Push daily  metoprolol tartrate 25 milliGRAM(s) Oral every 8 hours  morphine ER Tablet 15 milliGRAM(s) Oral every 12 hours  silver sulfADIAZINE 1% Cream 1 Application(s) Topical daily  simvastatin 10 milliGRAM(s) Oral at bedtime  warfarin 5 milliGRAM(s) Oral daily

## 2022-10-27 NOTE — PROGRESS NOTE ADULT - PROVIDER SPECIALTY LIST ADULT
Critical Care
Hospitalist
Infectious Disease
Vascular Surgery
Hospitalist
Infectious Disease
Critical Care
Hospitalist
Infectious Disease
Infectious Disease
Vascular Surgery
Critical Care
Critical Care
Hospitalist
Hospitalist
Infectious Disease

## 2022-10-27 NOTE — PROGRESS NOTE ADULT - ASSESSMENT
71 y/o female with pmhx of afib on coumadin, HFpEF, peripheral neuroapthy, DM II, PVD, HTN, HLD admitted on 11/12 for RLE pain admitted with:*Septic Shock due to Diffuse RLE Cellulitis/Soft Tissue infection with Associated Pseduomonas bacteremia.  - Blood culture showed pseudomonas cleared now  - was treated with  IV steroids might have RA component, pain has not improved; very high inflammatory makers ESR CRP; increase steroids to 40 BID for 24H, decrease to QD - > will taper to prednisone taper to her chronic dose. Per pt prednisone Rx by vascular for the leg pain  - repeat ESR CRP to see trend  repeat BC x 2 are negative to date from 10/16  -complete 14 days IV cefepime 2gmq8h until 10/26/22cleared by ID for discharge       *Chronic Atrial Fibrillation - on coumadin  - coumadin resumed.  goal inr 2-3   - metoprolol    *DM II  - ISS  - Diabetic diet   a1c is 5.8  uncontrolled due to steroids  add HAYDEN    * Moderate AS per [facility] Dr Mayer will d/w the case for OP fup    *HTN  *HLD  *Peripheral Neuropathy  On chronic Prednisone (unclear for with pathology)    Per pt prednisone Rx by vascular for the leg pain

## 2022-10-28 PROBLEM — Q82.0 HEREDITARY LYMPHEDEMA: Status: ACTIVE | Noted: 2019-03-06

## 2022-10-28 NOTE — ASSESSMENT
[FreeTextEntry1] : 73 y/o woman with LLE cellulitis. Pt has lymphedema, chronic venous stasis hyperpigmentation and dermatitis and peripheral neuropathy.  We discussed she will need a neurologic evaluation to diagnose etiology of pain.\par \par Plan:\par - Pt has cellultiis of left leg\par - Gave pt rx for cipro 500 mg \par - Continue Coumadin, ASA 81 mg and Pravastatin QD.\par - Continue Gabapentin to 800 mg TID for neuropathic pain.\par - Continue medical management of HTN.\par -  Continue conservative therapy: compression stockings 20-30 mmHg daily from awakening until bedtime, leg elevation above the level of the heart at rest and frequent ambulation.\par - Walk daily for exercise.\par - Refer to Neurology for evaluation of peripheral neuropathy.\par - RTC 2 week for evaluation of cellulitis \par \par A total of 20 minutes was spent with patient and coordinating care\par \par

## 2022-10-28 NOTE — PHYSICAL EXAM
[2+] : left 2+ [Ankle Swelling (On Exam)] : present [Ankle Swelling Bilaterally] : bilaterally  [] : bilaterally [Ankle Swelling On The Left] : moderate [Alert] : alert [Oriented to Person] : oriented to person [Oriented to Place] : oriented to place [Oriented to Time] : oriented to time [Calm] : calm [de-identified] : WD, WN, NAD. Awake, alert, interactive. Ambulates slowly with a cane for support. [FreeTextEntry1] : edema LLE>RLE\par erythema and warmth of LLE  [de-identified] : \par

## 2022-11-01 DIAGNOSIS — I50.32 CHRONIC DIASTOLIC (CONGESTIVE) HEART FAILURE: ICD-10-CM

## 2022-11-01 DIAGNOSIS — L97.819 NON-PRESSURE CHRONIC ULCER OF OTHER PART OF RIGHT LOWER LEG WITH UNSPECIFIED SEVERITY: ICD-10-CM

## 2022-11-01 DIAGNOSIS — R50.9 FEVER, UNSPECIFIED: ICD-10-CM

## 2022-11-01 DIAGNOSIS — R79.1 ABNORMAL COAGULATION PROFILE: ICD-10-CM

## 2022-11-01 DIAGNOSIS — E11.51 TYPE 2 DIABETES MELLITUS WITH DIABETIC PERIPHERAL ANGIOPATHY WITHOUT GANGRENE: ICD-10-CM

## 2022-11-01 DIAGNOSIS — E87.20 ACIDOSIS, UNSPECIFIED: ICD-10-CM

## 2022-11-01 DIAGNOSIS — Z79.01 LONG TERM (CURRENT) USE OF ANTICOAGULANTS: ICD-10-CM

## 2022-11-01 DIAGNOSIS — Z88.0 ALLERGY STATUS TO PENICILLIN: ICD-10-CM

## 2022-11-01 DIAGNOSIS — E83.42 HYPOMAGNESEMIA: ICD-10-CM

## 2022-11-01 DIAGNOSIS — I48.20 CHRONIC ATRIAL FIBRILLATION, UNSPECIFIED: ICD-10-CM

## 2022-11-01 DIAGNOSIS — N17.9 ACUTE KIDNEY FAILURE, UNSPECIFIED: ICD-10-CM

## 2022-11-01 DIAGNOSIS — Z79.84 LONG TERM (CURRENT) USE OF ORAL HYPOGLYCEMIC DRUGS: ICD-10-CM

## 2022-11-01 DIAGNOSIS — E11.42 TYPE 2 DIABETES MELLITUS WITH DIABETIC POLYNEUROPATHY: ICD-10-CM

## 2022-11-01 DIAGNOSIS — I11.0 HYPERTENSIVE HEART DISEASE WITH HEART FAILURE: ICD-10-CM

## 2022-11-01 DIAGNOSIS — I35.0 NONRHEUMATIC AORTIC (VALVE) STENOSIS: ICD-10-CM

## 2022-11-01 DIAGNOSIS — L03.115 CELLULITIS OF RIGHT LOWER LIMB: ICD-10-CM

## 2022-11-01 DIAGNOSIS — Z79.52 LONG TERM (CURRENT) USE OF SYSTEMIC STEROIDS: ICD-10-CM

## 2022-11-01 DIAGNOSIS — A41.52 SEPSIS DUE TO PSEUDOMONAS: ICD-10-CM

## 2022-11-01 DIAGNOSIS — I87.2 VENOUS INSUFFICIENCY (CHRONIC) (PERIPHERAL): ICD-10-CM

## 2022-11-01 DIAGNOSIS — T38.0X5A ADVERSE EFFECT OF GLUCOCORTICOIDS AND SYNTHETIC ANALOGUES, INITIAL ENCOUNTER: ICD-10-CM

## 2022-11-01 DIAGNOSIS — E66.9 OBESITY, UNSPECIFIED: ICD-10-CM

## 2022-11-01 DIAGNOSIS — L03.116 CELLULITIS OF LEFT LOWER LIMB: ICD-10-CM

## 2022-11-01 DIAGNOSIS — R65.21 SEVERE SEPSIS WITH SEPTIC SHOCK: ICD-10-CM

## 2022-11-01 DIAGNOSIS — E04.1 NONTOXIC SINGLE THYROID NODULE: ICD-10-CM

## 2022-11-04 ENCOUNTER — APPOINTMENT (OUTPATIENT)
Dept: VASCULAR SURGERY | Facility: CLINIC | Age: 72
End: 2022-11-04

## 2022-11-04 VITALS
BODY MASS INDEX: 38.65 KG/M2 | OXYGEN SATURATION: 97 % | HEIGHT: 70 IN | HEART RATE: 91 BPM | WEIGHT: 270 LBS | DIASTOLIC BLOOD PRESSURE: 84 MMHG | SYSTOLIC BLOOD PRESSURE: 146 MMHG

## 2022-11-04 PROCEDURE — 99213 OFFICE O/P EST LOW 20 MIN: CPT

## 2022-11-04 NOTE — HISTORY OF PRESENT ILLNESS
[FreeTextEntry1] : 8/6/21: 70 y/o female returns the office for follow-up. PMH:  DM2, HTN and A Fib. venous insufficiency, lymphedema and recurrent ulcers  She reports increased pain to her feet even with use of gabapentin 3 times a day.  In the past she states prednisone has helped but she never has had drastic improvement of symptoms.  She was advised to seek neurologic evaluation for symptoms but has not as of yet.  There continues to be improvement in edema to her legs with use of conservative treatment: Compression stockings 20 to 30 mmHg daily and lymphedema pumps.  She walks daily for exercise, elevates her legs at rest and ambulates frequently.  Pain is described as burning and tingling pain to her legs all day. She has been seeing a pain management specialist, but has not had relief of pain.  She takes ASA 81 mg, Coumadin and Pravastatin 10 mg QD. No current pain reported. No fever or chills. She is a non-smoker. \par \par 3/9/22: Pt still has leg pain since last visit. She feels the legs felt much better on prednisone. She has pain in her medial ankles bilaterally. She has been compliant with compression stockings. She does not use the pump because it feels it is too restrictive. She takes ASA 81 mg, Coumadin and Pravastatin 10 mg QD. Pt is still taking Gabapentin 800 mg. She does not take cymbalta. \par \par 6/24/22: Pt still has BLE leg pain. She feels the pain is improved since taking prednisone. She takes ASA 81 mg, Coumadin and Pravastatin 10 mg QD. Pt is still taking Gabapentin 800 mg. She does not take cymbalta. \par \par 7/8/22: Pt has increased LLE edema, erythema and pain.  She denies any recent fevers. She has been compliant with compression stockings. She takes ASA 81 mg, Coumadin and Pravastatin 10 mg QD. Pt is still taking Gabapentin 800 mg. She does not take cymbalta. \par \par 9/23/22: Pt has increased LLE edema, erythema and pain.  She denies any recent fevers. She has been compliant with compression stockings. She takes ASA 81 mg, Coumadin and Pravastatin 10 mg QD. Pt is still taking Gabapentin 800 mg. She does not take cymbalta. \par \par 10/7/22: Pt has new right ankle wound. She also has increased LLE edema, erythema and pain.  She denies any recent fevers. She has been compliant with compression stockings. She takes ASA 81 mg, Coumadin and Pravastatin 10 mg QD. Pt is still taking Gabapentin 800 mg. She does not take cymbalta. \par \par 11/4/22: Pts right ankle wound is getting larger. She has increased LLE edema, erythema and pain.  She denies any recent fevers. She has been compliant with compression stockings. She takes ASA 81 mg, Coumadin and Pravastatin 10 mg QD. Pt is still taking Gabapentin 800 mg. She does not take cymbalta.

## 2022-11-04 NOTE — ASSESSMENT
[FreeTextEntry1] : 73 y/o woman with right leg ulcer and left leg swelling and pain . Pt has lymphedema, chronic venous stasis hyperpigmentation and dermatitis and peripheral neuropathy.  We discussed she will need a neurologic evaluation to diagnose etiology of pain.\par \par - Silver nitrate applied to RLE ulcers\par - wrapped left leg with kerlex and ACE wrap \par - Continue Coumadin, ASA 81 mg and Pravastatin QD.\par - Continue Gabapentin to 800 mg TID for neuropathic pain.\par - Continue medical management of HTN.\par -  Continue conservative therapy: compression stockings 20-30 mmHg daily from awakening until bedtime, leg elevation above the level of the heart at rest and frequent ambulation.\par - Walk daily for exercise.\par - Refer to Neurology for evaluation of peripheral neuropathy.\par - RTC 1 week to monitor ulcer\par \par A total of 20 minutes was spent with patient and coordinating care\par \par

## 2022-11-04 NOTE — PHYSICAL EXAM
[2+] : left 2+ [Ankle Swelling (On Exam)] : present [Ankle Swelling Bilaterally] : bilaterally  [] : bilaterally [Ankle Swelling On The Left] : moderate [Alert] : alert [Oriented to Person] : oriented to person [Oriented to Place] : oriented to place [Oriented to Time] : oriented to time [Calm] : calm [de-identified] : WD, WN, NAD. Awake, alert, interactive. Ambulates slowly with a cane for support. [FreeTextEntry1] : two right medial calf ulcers, one 2 cm x 2 cm and second ulcer 1 cm x 1 cm  [de-identified] : \par

## 2022-11-09 ENCOUNTER — EMERGENCY (EMERGENCY)
Facility: HOSPITAL | Age: 72
LOS: 0 days | Discharge: HOME | End: 2022-11-09
Attending: EMERGENCY MEDICINE
Payer: MEDICARE

## 2022-11-09 VITALS
RESPIRATION RATE: 18 BRPM | TEMPERATURE: 99 F | DIASTOLIC BLOOD PRESSURE: 90 MMHG | SYSTOLIC BLOOD PRESSURE: 168 MMHG | HEART RATE: 90 BPM | OXYGEN SATURATION: 97 %

## 2022-11-09 VITALS — WEIGHT: 270.07 LBS | HEIGHT: 70 IN

## 2022-11-09 DIAGNOSIS — S91.012A LACERATION WITHOUT FOREIGN BODY, LEFT ANKLE, INITIAL ENCOUNTER: ICD-10-CM

## 2022-11-09 DIAGNOSIS — I48.91 UNSPECIFIED ATRIAL FIBRILLATION: ICD-10-CM

## 2022-11-09 DIAGNOSIS — Z98.890 OTHER SPECIFIED POSTPROCEDURAL STATES: Chronic | ICD-10-CM

## 2022-11-09 DIAGNOSIS — I50.9 HEART FAILURE, UNSPECIFIED: ICD-10-CM

## 2022-11-09 DIAGNOSIS — Z88.0 ALLERGY STATUS TO PENICILLIN: ICD-10-CM

## 2022-11-09 DIAGNOSIS — E04.1 NONTOXIC SINGLE THYROID NODULE: ICD-10-CM

## 2022-11-09 DIAGNOSIS — Z79.01 LONG TERM (CURRENT) USE OF ANTICOAGULANTS: ICD-10-CM
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
## 2022-11-09 DIAGNOSIS — E11.40 TYPE 2 DIABETES MELLITUS WITH DIABETIC NEUROPATHY, UNSPECIFIED: ICD-10-CM

## 2022-11-09 DIAGNOSIS — K95.09 OTHER COMPLICATIONS OF GASTRIC BAND PROCEDURE: Chronic | ICD-10-CM
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
## 2022-11-09 DIAGNOSIS — Z79.84 LONG TERM (CURRENT) USE OF ORAL HYPOGLYCEMIC DRUGS: ICD-10-CM

## 2022-11-09 DIAGNOSIS — E11.42 TYPE 2 DIABETES MELLITUS WITH DIABETIC POLYNEUROPATHY: ICD-10-CM

## 2022-11-09 DIAGNOSIS — I11.0 HYPERTENSIVE HEART DISEASE WITH HEART FAILURE: ICD-10-CM

## 2022-11-09 PROCEDURE — 12002 RPR S/N/AX/GEN/TRNK2.6-7.5CM: CPT

## 2022-11-09 PROCEDURE — 99283 EMERGENCY DEPT VISIT LOW MDM: CPT | Mod: 25

## 2022-11-09 PROCEDURE — 99283 EMERGENCY DEPT VISIT LOW MDM: CPT | Mod: FS,25

## 2022-11-09 RX ORDER — CEPHALEXIN 500 MG
1 CAPSULE ORAL
Qty: 21 | Refills: 0
Start: 2022-11-09 | End: 2022-11-15

## 2022-11-09 NOTE — ED STATDOCS - PHYSICAL EXAMINATION
Constitutional: NAD AOx3  Eyes: PERRL EOMI  Head: Normocephalic atraumatic  Mouth: MMM  Cardiac: regular rate and rhythm  Resp: Lungs CTAB  GI: Abd s/nd/nt  Neuro: CN2-12 grossly intact, CAPPS x 4  Skin: No visible rashes. +bilateral lower extremity with chronic venous stasis changes. +left lower extremity 2 lacerations draining serosanguinous fluid.

## 2022-11-09 NOTE — ED STATDOCS - PROGRESS NOTE DETAILS
lac repaired w/o complication, wound care and S&S of infection discussed, given pt's recent cellulitis and subsequent sepsis advised ppx abx, pt states she has been on so many antibiotics and would not like to take more, pt has an appt with her wound care MD Dr. Yoo on friday advised pt to d/w him regarding need for abx, will d/c home with outpt f/u, return precautions, pt also already has visiting RN services set up for wound care, dressing changes   Dorota Leung PA-C

## 2022-11-09 NOTE — ED STATDOCS - OBJECTIVE STATEMENT
73 y/o female PMHx of DM2, CHF, Afib presents to ED due to complaints of laceration to left lower leg. Pt states she was taking out her decorations for thanksgiving and a ceramic plate broke on her lower leg bleeding controlled.

## 2022-11-09 NOTE — ED STATDOCS - PATIENT PORTAL LINK FT
You can access the FollowMyHealth Patient Portal offered by [facility] by registering at the following website: [email or web address]. By joining [employer]’s FollowMyHealth portal, you will also be able to view your health information using other applications (apps) compatible with our system.

## 2022-11-09 NOTE — ED ADULT TRIAGE NOTE - CHIEF COMPLAINT QUOTE
pt presents to ED due to complaints of lac to left lower leg pt states she was taking out her decorations for thanksgiving and a ceramic plate broke on her lower leg bleeding controlled pt has hx of DM

## 2022-11-11 ENCOUNTER — APPOINTMENT (OUTPATIENT)
Dept: VASCULAR SURGERY | Facility: CLINIC | Age: 72
End: 2022-11-11

## 2022-11-11 VITALS
WEIGHT: 270 LBS | DIASTOLIC BLOOD PRESSURE: 75 MMHG | SYSTOLIC BLOOD PRESSURE: 131 MMHG | HEART RATE: 70 BPM | HEIGHT: 70 IN | OXYGEN SATURATION: 96 % | BODY MASS INDEX: 38.65 KG/M2

## 2022-11-11 PROCEDURE — 99213 OFFICE O/P EST LOW 20 MIN: CPT

## 2022-11-15 ENCOUNTER — OUTPATIENT (OUTPATIENT)
Dept: OUTPATIENT SERVICES | Facility: HOSPITAL | Age: 72
LOS: 1 days | End: 2022-11-15
Payer: MEDICARE

## 2022-11-15 DIAGNOSIS — K95.09 OTHER COMPLICATIONS OF GASTRIC BAND PROCEDURE: Chronic | ICD-10-CM

## 2022-11-15 DIAGNOSIS — Z98.890 OTHER SPECIFIED POSTPROCEDURAL STATES: Chronic | ICD-10-CM

## 2022-11-15 DIAGNOSIS — L97.819 NON-PRESSURE CHRONIC ULCER OF OTHER PART OF RIGHT LOWER LEG WITH UNSPECIFIED SEVERITY: ICD-10-CM

## 2022-11-15 DIAGNOSIS — M79.604 PAIN IN RIGHT LEG: ICD-10-CM

## 2022-11-15 PROCEDURE — 17250 CHEM CAUT OF GRANLTJ TISSUE: CPT

## 2022-11-15 PROCEDURE — 99203 OFFICE O/P NEW LOW 30 MIN: CPT

## 2022-11-15 PROCEDURE — 99213 OFFICE O/P EST LOW 20 MIN: CPT

## 2022-11-15 NOTE — HISTORY OF PRESENT ILLNESS
[FreeTextEntry1] : 8/6/21: 70 y/o female returns the office for follow-up. PMH:  DM2, HTN and A Fib. venous insufficiency, lymphedema and recurrent ulcers  She reports increased pain to her feet even with use of gabapentin 3 times a day.  In the past she states prednisone has helped but she never has had drastic improvement of symptoms.  She was advised to seek neurologic evaluation for symptoms but has not as of yet.  There continues to be improvement in edema to her legs with use of conservative treatment: Compression stockings 20 to 30 mmHg daily and lymphedema pumps.  She walks daily for exercise, elevates her legs at rest and ambulates frequently.  Pain is described as burning and tingling pain to her legs all day. She has been seeing a pain management specialist, but has not had relief of pain.  She takes ASA 81 mg, Coumadin and Pravastatin 10 mg QD. No current pain reported. No fever or chills. She is a non-smoker. \par \par 3/9/22: Pt still has leg pain since last visit. She feels the legs felt much better on prednisone. She has pain in her medial ankles bilaterally. She has been compliant with compression stockings. She does not use the pump because it feels it is too restrictive. She takes ASA 81 mg, Coumadin and Pravastatin 10 mg QD. Pt is still taking Gabapentin 800 mg. She does not take cymbalta. \par \par 6/24/22: Pt still has BLE leg pain. She feels the pain is improved since taking prednisone. She takes ASA 81 mg, Coumadin and Pravastatin 10 mg QD. Pt is still taking Gabapentin 800 mg. She does not take cymbalta. \par \par 7/8/22: Pt has increased LLE edema, erythema and pain.  She denies any recent fevers. She has been compliant with compression stockings. She takes ASA 81 mg, Coumadin and Pravastatin 10 mg QD. Pt is still taking Gabapentin 800 mg. She does not take cymbalta. \par \par 9/23/22: Pt has increased LLE edema, erythema and pain.  She denies any recent fevers. She has been compliant with compression stockings. She takes ASA 81 mg, Coumadin and Pravastatin 10 mg QD. Pt is still taking Gabapentin 800 mg. She does not take cymbalta. \par \par 10/7/22: Pt has new right ankle wound. She also has increased LE edema, erythema and pain.  She denies any recent fevers. She has been compliant with compression stockings. She takes ASA 81 mg, Coumadin and Pravastatin 10 mg QD. Pt is still taking Gabapentin 800 mg. She does not take cymbalta. \par \par 11/4/22: Pts right ankle wound is getting larger. She has increased LE edema, erythema and pain.  She denies any recent fevers. She has been compliant with compression stockings. She takes ASA 81 mg, Coumadin and Pravastatin 10 mg QD. Pt is still taking Gabapentin 800 mg. She does not take cymbalta. \par \par 11/11/22: Pts right ankle wound is getting larger. She has increased LE edema, erythema and pain.  She denies any recent fevers. She has been compliant with compression stockings. She takes ASA 81 mg, Coumadin and Pravastatin 10 mg QD. Pt is still taking Gabapentin 800 mg. She does not take cymbalta.

## 2022-11-15 NOTE — ASSESSMENT
[FreeTextEntry1] : 71 y/o woman with right leg ulcer and left leg swelling and pain . Pt has lymphedema, chronic venous stasis hyperpigmentation and dermatitis and peripheral neuropathy.  We discussed she will need a neurologic evaluation to diagnose etiology of pain.\par \par - Silver nitrate applied to RLE ulcers\par - wrapped left leg with kerlex and ACE wrap \par - Continue Coumadin, ASA 81 mg and Pravastatin QD.\par - Continue Gabapentin to 800 mg TID for neuropathic pain.\par - Continue medical management of HTN.\par -  Continue conservative therapy: compression stockings 20-30 mmHg daily from awakening until bedtime, leg elevation above the level of the heart at rest and frequent ambulation.\par - Walk daily for exercise.\par - Refer to Neurology for evaluation of peripheral neuropathy.\par - RTC 1 week to monitor ulcer\par \par A total of 20 minutes was spent with patient and coordinating care\par \par

## 2022-11-15 NOTE — ED ADULT NURSE NOTE - TOBACCO SCREENING (FROM THE ASSIST)
[Arthralgia] : arthralgia [Joint Pain] : joint pain [Chills] : no chills [Discharge] : no discharge [Decrease Hearing] : no decrease in hearing [Lower Ext Edema] : no lower extremity edema [SOB at rest] : no shortness of breath at rest [Abdominal Pain] : no abdominal pain Statement Selected

## 2022-11-22 ENCOUNTER — OUTPATIENT (OUTPATIENT)
Dept: OUTPATIENT SERVICES | Facility: HOSPITAL | Age: 72
LOS: 1 days | End: 2022-11-22
Payer: MEDICARE

## 2022-11-22 DIAGNOSIS — K95.09 OTHER COMPLICATIONS OF GASTRIC BAND PROCEDURE: Chronic | ICD-10-CM

## 2022-11-22 DIAGNOSIS — Z98.890 OTHER SPECIFIED POSTPROCEDURAL STATES: Chronic | ICD-10-CM

## 2022-11-22 DIAGNOSIS — L97.819 NON-PRESSURE CHRONIC ULCER OF OTHER PART OF RIGHT LOWER LEG WITH UNSPECIFIED SEVERITY: ICD-10-CM

## 2022-11-22 PROCEDURE — 99213 OFFICE O/P EST LOW 20 MIN: CPT

## 2022-11-22 NOTE — DISCHARGE NOTE NURSING/CASE MANAGEMENT/SOCIAL WORK - NSTRANSFERDENTURES_GEN_A_NUR
A  personal message from Dr Cindy Roberto,  Thank you so much for allowing me to care for you today  I pride myself in the care and attention I give all my patients  I hope you were a witness to this tonight  If for any reason your condition does not improve, worsens, or you have a question that was not answered during your visit you can feel free to text me on my personal phone   # [phone]  I will answer to your message and continue your care past your emergency room visit  upper/full

## 2022-11-29 ENCOUNTER — OUTPATIENT (OUTPATIENT)
Dept: OUTPATIENT SERVICES | Facility: HOSPITAL | Age: 72
LOS: 1 days | End: 2022-11-29
Payer: MEDICARE

## 2022-11-29 ENCOUNTER — INPATIENT (INPATIENT)
Facility: HOSPITAL | Age: 72
LOS: 9 days | Discharge: HOME HEALTH | DRG: 291 | End: 2022-12-09
Attending: INTERNAL MEDICINE | Admitting: INTERNAL MEDICINE
Payer: MEDICARE

## 2022-11-29 VITALS — HEART RATE: 87 BPM | HEIGHT: 70 IN | OXYGEN SATURATION: 96 % | RESPIRATION RATE: 24 BRPM

## 2022-11-29 DIAGNOSIS — Z98.890 OTHER SPECIFIED POSTPROCEDURAL STATES: Chronic | ICD-10-CM

## 2022-11-29 DIAGNOSIS — K95.09 OTHER COMPLICATIONS OF GASTRIC BAND PROCEDURE: Chronic | ICD-10-CM

## 2022-11-29 DIAGNOSIS — L97.819 NON-PRESSURE CHRONIC ULCER OF OTHER PART OF RIGHT LOWER LEG WITH UNSPECIFIED SEVERITY: ICD-10-CM

## 2022-11-29 DIAGNOSIS — I50.9 HEART FAILURE, UNSPECIFIED: ICD-10-CM

## 2022-11-29 LAB
ALBUMIN SERPL ELPH-MCNC: 2.5 G/DL — LOW (ref 3.3–5)
ALP SERPL-CCNC: 47 U/L — SIGNIFICANT CHANGE UP (ref 40–120)
ALT FLD-CCNC: 16 U/L — SIGNIFICANT CHANGE UP (ref 12–78)
ANION GAP SERPL CALC-SCNC: 6 MMOL/L — SIGNIFICANT CHANGE UP (ref 5–17)
APTT BLD: 32.3 SEC — SIGNIFICANT CHANGE UP (ref 27.5–35.5)
AST SERPL-CCNC: 9 U/L — LOW (ref 15–37)
BASOPHILS # BLD AUTO: 0.05 K/UL — SIGNIFICANT CHANGE UP (ref 0–0.2)
BASOPHILS NFR BLD AUTO: 0.5 % — SIGNIFICANT CHANGE UP (ref 0–2)
BILIRUB SERPL-MCNC: 0.4 MG/DL — SIGNIFICANT CHANGE UP (ref 0.2–1.2)
BUN SERPL-MCNC: 9 MG/DL — SIGNIFICANT CHANGE UP (ref 7–23)
CALCIUM SERPL-MCNC: 8.9 MG/DL — SIGNIFICANT CHANGE UP (ref 8.5–10.1)
CHLORIDE SERPL-SCNC: 108 MMOL/L — SIGNIFICANT CHANGE UP (ref 96–108)
CO2 SERPL-SCNC: 25 MMOL/L — SIGNIFICANT CHANGE UP (ref 22–31)
CREAT SERPL-MCNC: 0.6 MG/DL — SIGNIFICANT CHANGE UP (ref 0.5–1.3)
EGFR: 95 ML/MIN/1.73M2 — SIGNIFICANT CHANGE UP
EOSINOPHIL # BLD AUTO: 0.04 K/UL — SIGNIFICANT CHANGE UP (ref 0–0.5)
EOSINOPHIL NFR BLD AUTO: 0.4 % — SIGNIFICANT CHANGE UP (ref 0–6)
GLUCOSE SERPL-MCNC: 98 MG/DL — SIGNIFICANT CHANGE UP (ref 70–99)
HCT VFR BLD CALC: 38 % — SIGNIFICANT CHANGE UP (ref 34.5–45)
HGB BLD-MCNC: 12.3 G/DL — SIGNIFICANT CHANGE UP (ref 11.5–15.5)
IMM GRANULOCYTES NFR BLD AUTO: 0.5 % — SIGNIFICANT CHANGE UP (ref 0–0.9)
INR BLD: 1.66 RATIO — HIGH (ref 0.88–1.16)
LYMPHOCYTES # BLD AUTO: 1.24 K/UL — SIGNIFICANT CHANGE UP (ref 1–3.3)
LYMPHOCYTES # BLD AUTO: 11.9 % — LOW (ref 13–44)
MCHC RBC-ENTMCNC: 30.8 PG — SIGNIFICANT CHANGE UP (ref 27–34)
MCHC RBC-ENTMCNC: 32.4 GM/DL — SIGNIFICANT CHANGE UP (ref 32–36)
MCV RBC AUTO: 95 FL — SIGNIFICANT CHANGE UP (ref 80–100)
MONOCYTES # BLD AUTO: 0.95 K/UL — HIGH (ref 0–0.9)
MONOCYTES NFR BLD AUTO: 9.1 % — SIGNIFICANT CHANGE UP (ref 2–14)
NEUTROPHILS # BLD AUTO: 8.07 K/UL — HIGH (ref 1.8–7.4)
NEUTROPHILS NFR BLD AUTO: 77.6 % — HIGH (ref 43–77)
NT-PROBNP SERPL-SCNC: 1699 PG/ML — HIGH (ref 0–125)
PLATELET # BLD AUTO: 318 K/UL — SIGNIFICANT CHANGE UP (ref 150–400)
POTASSIUM SERPL-MCNC: 3.8 MMOL/L — SIGNIFICANT CHANGE UP (ref 3.5–5.3)
POTASSIUM SERPL-SCNC: 3.8 MMOL/L — SIGNIFICANT CHANGE UP (ref 3.5–5.3)
PROT SERPL-MCNC: 7.2 GM/DL — SIGNIFICANT CHANGE UP (ref 6–8.3)
PROTHROM AB SERPL-ACNC: 19.4 SEC — HIGH (ref 10.5–13.4)
RAPID RVP RESULT: SIGNIFICANT CHANGE UP
RBC # BLD: 4 M/UL — SIGNIFICANT CHANGE UP (ref 3.8–5.2)
RBC # FLD: 13.2 % — SIGNIFICANT CHANGE UP (ref 10.3–14.5)
SARS-COV-2 RNA SPEC QL NAA+PROBE: SIGNIFICANT CHANGE UP
SODIUM SERPL-SCNC: 139 MMOL/L — SIGNIFICANT CHANGE UP (ref 135–145)
TROPONIN I, HIGH SENSITIVITY RESULT: 7.06 NG/L — SIGNIFICANT CHANGE UP
WBC # BLD: 10.4 K/UL — SIGNIFICANT CHANGE UP (ref 3.8–10.5)
WBC # FLD AUTO: 10.4 K/UL — SIGNIFICANT CHANGE UP (ref 3.8–10.5)

## 2022-11-29 PROCEDURE — 80053 COMPREHEN METABOLIC PANEL: CPT

## 2022-11-29 PROCEDURE — 99213 OFFICE O/P EST LOW 20 MIN: CPT

## 2022-11-29 PROCEDURE — 85610 PROTHROMBIN TIME: CPT

## 2022-11-29 PROCEDURE — 84100 ASSAY OF PHOSPHORUS: CPT

## 2022-11-29 PROCEDURE — 99285 EMERGENCY DEPT VISIT HI MDM: CPT

## 2022-11-29 PROCEDURE — 80061 LIPID PANEL: CPT

## 2022-11-29 PROCEDURE — 94640 AIRWAY INHALATION TREATMENT: CPT

## 2022-11-29 PROCEDURE — 97116 GAIT TRAINING THERAPY: CPT | Mod: GP

## 2022-11-29 PROCEDURE — 71045 X-RAY EXAM CHEST 1 VIEW: CPT

## 2022-11-29 PROCEDURE — 93010 ELECTROCARDIOGRAM REPORT: CPT

## 2022-11-29 PROCEDURE — U0003: CPT

## 2022-11-29 PROCEDURE — 82962 GLUCOSE BLOOD TEST: CPT

## 2022-11-29 PROCEDURE — 83036 HEMOGLOBIN GLYCOSYLATED A1C: CPT

## 2022-11-29 PROCEDURE — 97162 PT EVAL MOD COMPLEX 30 MIN: CPT | Mod: GP

## 2022-11-29 PROCEDURE — 80048 BASIC METABOLIC PNL TOTAL CA: CPT

## 2022-11-29 PROCEDURE — 85730 THROMBOPLASTIN TIME PARTIAL: CPT

## 2022-11-29 PROCEDURE — 87086 URINE CULTURE/COLONY COUNT: CPT

## 2022-11-29 PROCEDURE — 81001 URINALYSIS AUTO W/SCOPE: CPT

## 2022-11-29 PROCEDURE — 71275 CT ANGIOGRAPHY CHEST: CPT | Mod: 26,MA

## 2022-11-29 PROCEDURE — 85027 COMPLETE CBC AUTOMATED: CPT

## 2022-11-29 PROCEDURE — 36415 COLL VENOUS BLD VENIPUNCTURE: CPT

## 2022-11-29 PROCEDURE — U0005: CPT

## 2022-11-29 PROCEDURE — 83735 ASSAY OF MAGNESIUM: CPT

## 2022-11-29 PROCEDURE — 71045 X-RAY EXAM CHEST 1 VIEW: CPT | Mod: 26

## 2022-11-29 PROCEDURE — 94760 N-INVAS EAR/PLS OXIMETRY 1: CPT

## 2022-11-29 PROCEDURE — 87077 CULTURE AEROBIC IDENTIFY: CPT

## 2022-11-29 PROCEDURE — 97530 THERAPEUTIC ACTIVITIES: CPT | Mod: GP

## 2022-11-29 PROCEDURE — 93306 TTE W/DOPPLER COMPLETE: CPT

## 2022-11-29 PROCEDURE — 85025 COMPLETE CBC W/AUTO DIFF WBC: CPT

## 2022-11-29 PROCEDURE — 93005 ELECTROCARDIOGRAM TRACING: CPT

## 2022-11-29 PROCEDURE — 87186 SC STD MICRODIL/AGAR DIL: CPT

## 2022-11-29 RX ORDER — FUROSEMIDE 40 MG
20 TABLET ORAL ONCE
Refills: 0 | Status: COMPLETED | OUTPATIENT
Start: 2022-11-29 | End: 2022-11-29

## 2022-11-29 RX ORDER — WARFARIN SODIUM 2.5 MG/1
5 TABLET ORAL ONCE
Refills: 0 | Status: COMPLETED | OUTPATIENT
Start: 2022-11-29 | End: 2022-11-30

## 2022-11-29 RX ADMIN — Medication 20 MILLIGRAM(S): at 18:36

## 2022-11-29 NOTE — ED ADULT NURSE REASSESSMENT NOTE - NSIMPLEMENTINTERV_GEN_ALL_ED
Implemented All Fall Risk Interventions:  [place] to call system. Call bell, personal items and telephone within reach. Instruct patient to call for assistance. Room bathroom lighting operational. Non-slip footwear when patient is off stretcher. Physically safe environment: no spills, clutter or unnecessary equipment. Stretcher in lowest position, wheels locked, appropriate side rails in place. Provide visual cue, wrist band, yellow gown, etc. Monitor gait and stability. Monitor for mental status changes and reorient to person, place, and time. Review medications for side effects contributing to fall risk. Reinforce activity limits and safety measures with patient and family. Implemented All Fall with Harm Risk Interventions:  [place] to call system. Call bell, personal items and telephone within reach. Instruct patient to call for assistance. Room bathroom lighting operational. Non-slip footwear when patient is off stretcher. Physically safe environment: no spills, clutter or unnecessary equipment. Stretcher in lowest position, wheels locked, appropriate side rails in place. Provide visual cue, wrist band, yellow gown, etc. Monitor gait and stability. Monitor for mental status changes and reorient to person, place, and time. Review medications for side effects contributing to fall risk. Reinforce activity limits and safety measures with patient and family. Provide visual clues: red socks.

## 2022-11-29 NOTE — ED PROVIDER NOTE - NS ED ROS FT
Constitutional: No fever or chills  Eyes: No visual changes  HEENT: No throat pain  CV: No chest pain  Resp: +SOB, no cough  GI: No abd pain, nausea or vomiting  : No dysuria  MSK: No musculoskeletal pain  Skin: No rash  Neuro: No headache

## 2022-11-29 NOTE — ED ADULT TRIAGE NOTE - CHIEF COMPLAINT QUOTE
Pt arrives to ED complaining of dyspnea starting two days ago. respirations even, unlabored, regular. Hx DM, afib, CHF.

## 2022-11-29 NOTE — ED PROVIDER NOTE - OBJECTIVE STATEMENT
73 y/o F w/ PMHx of Afib, DM, CHF, HTN presents to the ED c/o SOB for a couple of days. Pt was at wound care today when she felt sx. Reports a light fever, denies cough. On Lasix ,when needed. Didn't take Lasix today. On Warfarin.

## 2022-11-29 NOTE — ED PROVIDER NOTE - PHYSICAL EXAMINATION
Constitutional: NAD AOx3, obese and chronically ill appearing  Eyes: PERRL EOMI  Head: Normocephalic atraumatic  Mouth: MMM  Cardiac: regular rate and rhythm  Resp: Lungs CTAB  GI: Abd s/nd/nt  Neuro: CN2-12 grossly intact, CAPPS x 4  Skin: No visible rashes   Extremities: +BLE with unaboo dressings and darkened skin consistent w chronic venic stasis.  plan: workup for sob, including cardiac labs will check INR low suspicion PE if INR therapeutic.

## 2022-11-29 NOTE — ED PROVIDER NOTE - CLINICAL SUMMARY MEDICAL DECISION MAKING FREE TEXT BOX
Plan: workup for SOB including cardiac labs. Will check INR, low suspicion for PE if INR is therapeutic.

## 2022-11-30 LAB
A1C WITH ESTIMATED AVERAGE GLUCOSE RESULT: 6.3 % — HIGH (ref 4–5.6)
ADD ON TEST-SPECIMEN IN LAB: SIGNIFICANT CHANGE UP
ALBUMIN SERPL ELPH-MCNC: 2.5 G/DL — LOW (ref 3.3–5)
ALP SERPL-CCNC: 47 U/L — SIGNIFICANT CHANGE UP (ref 40–120)
ALT FLD-CCNC: 14 U/L — SIGNIFICANT CHANGE UP (ref 12–78)
ANION GAP SERPL CALC-SCNC: 8 MMOL/L — SIGNIFICANT CHANGE UP (ref 5–17)
APTT BLD: 33.8 SEC — SIGNIFICANT CHANGE UP (ref 27.5–35.5)
AST SERPL-CCNC: 10 U/L — LOW (ref 15–37)
BASOPHILS # BLD AUTO: 0.06 K/UL — SIGNIFICANT CHANGE UP (ref 0–0.2)
BASOPHILS NFR BLD AUTO: 0.8 % — SIGNIFICANT CHANGE UP (ref 0–2)
BILIRUB SERPL-MCNC: 0.6 MG/DL — SIGNIFICANT CHANGE UP (ref 0.2–1.2)
BUN SERPL-MCNC: 13 MG/DL — SIGNIFICANT CHANGE UP (ref 7–23)
CALCIUM SERPL-MCNC: 8.8 MG/DL — SIGNIFICANT CHANGE UP (ref 8.5–10.1)
CHLORIDE SERPL-SCNC: 105 MMOL/L — SIGNIFICANT CHANGE UP (ref 96–108)
CHOLEST SERPL-MCNC: 137 MG/DL — SIGNIFICANT CHANGE UP
CO2 SERPL-SCNC: 27 MMOL/L — SIGNIFICANT CHANGE UP (ref 22–31)
CREAT SERPL-MCNC: 0.5 MG/DL — SIGNIFICANT CHANGE UP (ref 0.5–1.3)
EGFR: 99 ML/MIN/1.73M2 — SIGNIFICANT CHANGE UP
EOSINOPHIL # BLD AUTO: 0.14 K/UL — SIGNIFICANT CHANGE UP (ref 0–0.5)
EOSINOPHIL NFR BLD AUTO: 1.9 % — SIGNIFICANT CHANGE UP (ref 0–6)
ESTIMATED AVERAGE GLUCOSE: 134 MG/DL — HIGH (ref 68–114)
GLUCOSE SERPL-MCNC: 90 MG/DL — SIGNIFICANT CHANGE UP (ref 70–99)
HCT VFR BLD CALC: 37.8 % — SIGNIFICANT CHANGE UP (ref 34.5–45)
HDLC SERPL-MCNC: 51 MG/DL — SIGNIFICANT CHANGE UP
HGB BLD-MCNC: 12.2 G/DL — SIGNIFICANT CHANGE UP (ref 11.5–15.5)
IMM GRANULOCYTES NFR BLD AUTO: 0.5 % — SIGNIFICANT CHANGE UP (ref 0–0.9)
INR BLD: 1.7 RATIO — HIGH (ref 0.88–1.16)
LIPID PNL WITH DIRECT LDL SERPL: 69 MG/DL — SIGNIFICANT CHANGE UP
LYMPHOCYTES # BLD AUTO: 1.35 K/UL — SIGNIFICANT CHANGE UP (ref 1–3.3)
LYMPHOCYTES # BLD AUTO: 18 % — SIGNIFICANT CHANGE UP (ref 13–44)
MCHC RBC-ENTMCNC: 30.3 PG — SIGNIFICANT CHANGE UP (ref 27–34)
MCHC RBC-ENTMCNC: 32.3 GM/DL — SIGNIFICANT CHANGE UP (ref 32–36)
MCV RBC AUTO: 94 FL — SIGNIFICANT CHANGE UP (ref 80–100)
MONOCYTES # BLD AUTO: 0.97 K/UL — HIGH (ref 0–0.9)
MONOCYTES NFR BLD AUTO: 12.9 % — SIGNIFICANT CHANGE UP (ref 2–14)
NEUTROPHILS # BLD AUTO: 4.96 K/UL — SIGNIFICANT CHANGE UP (ref 1.8–7.4)
NEUTROPHILS NFR BLD AUTO: 65.9 % — SIGNIFICANT CHANGE UP (ref 43–77)
NON HDL CHOLESTEROL: 87 MG/DL — SIGNIFICANT CHANGE UP
PLATELET # BLD AUTO: 316 K/UL — SIGNIFICANT CHANGE UP (ref 150–400)
POTASSIUM SERPL-MCNC: 2.9 MMOL/L — CRITICAL LOW (ref 3.5–5.3)
POTASSIUM SERPL-SCNC: 2.9 MMOL/L — CRITICAL LOW (ref 3.5–5.3)
PROT SERPL-MCNC: 7.1 GM/DL — SIGNIFICANT CHANGE UP (ref 6–8.3)
PROTHROM AB SERPL-ACNC: 19.8 SEC — HIGH (ref 10.5–13.4)
RBC # BLD: 4.02 M/UL — SIGNIFICANT CHANGE UP (ref 3.8–5.2)
RBC # FLD: 13.2 % — SIGNIFICANT CHANGE UP (ref 10.3–14.5)
SODIUM SERPL-SCNC: 140 MMOL/L — SIGNIFICANT CHANGE UP (ref 135–145)
TRIGL SERPL-MCNC: 87 MG/DL — SIGNIFICANT CHANGE UP
WBC # BLD: 7.52 K/UL — SIGNIFICANT CHANGE UP (ref 3.8–10.5)
WBC # FLD AUTO: 7.52 K/UL — SIGNIFICANT CHANGE UP (ref 3.8–10.5)

## 2022-11-30 PROCEDURE — 93306 TTE W/DOPPLER COMPLETE: CPT | Mod: 26

## 2022-11-30 PROCEDURE — 99222 1ST HOSP IP/OBS MODERATE 55: CPT

## 2022-11-30 RX ORDER — ONDANSETRON 8 MG/1
4 TABLET, FILM COATED ORAL EVERY 6 HOURS
Refills: 0 | Status: DISCONTINUED | OUTPATIENT
Start: 2022-11-30 | End: 2022-12-09

## 2022-11-30 RX ORDER — ZOLPIDEM TARTRATE 10 MG/1
5 TABLET ORAL ONCE
Refills: 0 | Status: DISCONTINUED | OUTPATIENT
Start: 2022-11-30 | End: 2022-11-30

## 2022-11-30 RX ORDER — GABAPENTIN 400 MG/1
800 CAPSULE ORAL THREE TIMES A DAY
Refills: 0 | Status: DISCONTINUED | OUTPATIENT
Start: 2022-11-30 | End: 2022-12-09

## 2022-11-30 RX ORDER — METOPROLOL TARTRATE 50 MG
50 TABLET ORAL DAILY
Refills: 0 | Status: DISCONTINUED | OUTPATIENT
Start: 2022-11-30 | End: 2022-12-09

## 2022-11-30 RX ORDER — DEXTROSE 50 % IN WATER 50 %
15 SYRINGE (ML) INTRAVENOUS ONCE
Refills: 0 | Status: DISCONTINUED | OUTPATIENT
Start: 2022-11-30 | End: 2022-12-07

## 2022-11-30 RX ORDER — DEXTROSE 50 % IN WATER 50 %
25 SYRINGE (ML) INTRAVENOUS ONCE
Refills: 0 | Status: DISCONTINUED | OUTPATIENT
Start: 2022-11-30 | End: 2022-12-07

## 2022-11-30 RX ORDER — FUROSEMIDE 40 MG
20 TABLET ORAL DAILY
Refills: 0 | Status: DISCONTINUED | OUTPATIENT
Start: 2022-11-30 | End: 2022-12-01

## 2022-11-30 RX ORDER — MONTELUKAST 4 MG/1
10 TABLET, CHEWABLE ORAL AT BEDTIME
Refills: 0 | Status: DISCONTINUED | OUTPATIENT
Start: 2022-11-30 | End: 2022-12-09

## 2022-11-30 RX ORDER — POTASSIUM CHLORIDE 20 MEQ
40 PACKET (EA) ORAL ONCE
Refills: 0 | Status: COMPLETED | OUTPATIENT
Start: 2022-11-30 | End: 2022-11-30

## 2022-11-30 RX ORDER — MAGNESIUM SULFATE 500 MG/ML
1 VIAL (ML) INJECTION ONCE
Refills: 0 | Status: COMPLETED | OUTPATIENT
Start: 2022-11-30 | End: 2022-11-30

## 2022-11-30 RX ORDER — POTASSIUM CHLORIDE 20 MEQ
10 PACKET (EA) ORAL
Refills: 0 | Status: COMPLETED | OUTPATIENT
Start: 2022-11-30 | End: 2022-11-30

## 2022-11-30 RX ORDER — SODIUM CHLORIDE 9 MG/ML
1000 INJECTION, SOLUTION INTRAVENOUS
Refills: 0 | Status: DISCONTINUED | OUTPATIENT
Start: 2022-11-30 | End: 2022-12-07

## 2022-11-30 RX ORDER — DEXTROSE 50 % IN WATER 50 %
12.5 SYRINGE (ML) INTRAVENOUS ONCE
Refills: 0 | Status: DISCONTINUED | OUTPATIENT
Start: 2022-11-30 | End: 2022-12-07

## 2022-11-30 RX ORDER — LISINOPRIL 2.5 MG/1
2.5 TABLET ORAL DAILY
Refills: 0 | Status: DISCONTINUED | OUTPATIENT
Start: 2022-11-30 | End: 2022-12-01

## 2022-11-30 RX ORDER — ATORVASTATIN CALCIUM 80 MG/1
10 TABLET, FILM COATED ORAL AT BEDTIME
Refills: 0 | Status: DISCONTINUED | OUTPATIENT
Start: 2022-11-30 | End: 2022-12-09

## 2022-11-30 RX ORDER — ACETAMINOPHEN 500 MG
650 TABLET ORAL EVERY 6 HOURS
Refills: 0 | Status: DISCONTINUED | OUTPATIENT
Start: 2022-11-30 | End: 2022-12-09

## 2022-11-30 RX ORDER — INSULIN LISPRO 100/ML
VIAL (ML) SUBCUTANEOUS
Refills: 0 | Status: DISCONTINUED | OUTPATIENT
Start: 2022-11-30 | End: 2022-12-07

## 2022-11-30 RX ORDER — ZOLPIDEM TARTRATE 10 MG/1
5 TABLET ORAL AT BEDTIME
Refills: 0 | Status: DISCONTINUED | OUTPATIENT
Start: 2022-11-30 | End: 2022-12-01

## 2022-11-30 RX ORDER — INSULIN LISPRO 100/ML
VIAL (ML) SUBCUTANEOUS AT BEDTIME
Refills: 0 | Status: DISCONTINUED | OUTPATIENT
Start: 2022-11-30 | End: 2022-12-07

## 2022-11-30 RX ORDER — VERAPAMIL HCL 240 MG
120 CAPSULE, EXTENDED RELEASE PELLETS 24 HR ORAL
Refills: 0 | Status: DISCONTINUED | OUTPATIENT
Start: 2022-11-30 | End: 2022-12-09

## 2022-11-30 RX ORDER — GLUCAGON INJECTION, SOLUTION 0.5 MG/.1ML
1 INJECTION, SOLUTION SUBCUTANEOUS ONCE
Refills: 0 | Status: DISCONTINUED | OUTPATIENT
Start: 2022-11-30 | End: 2022-12-07

## 2022-11-30 RX ORDER — INFLUENZA VIRUS VACCINE 15; 15; 15; 15 UG/.5ML; UG/.5ML; UG/.5ML; UG/.5ML
0.7 SUSPENSION INTRAMUSCULAR ONCE
Refills: 0 | Status: DISCONTINUED | OUTPATIENT
Start: 2022-11-30 | End: 2022-12-09

## 2022-11-30 RX ORDER — CHOLESTYRAMINE 4 G/9G
4 POWDER, FOR SUSPENSION ORAL
Refills: 0 | Status: DISCONTINUED | OUTPATIENT
Start: 2022-11-30 | End: 2022-12-09

## 2022-11-30 RX ORDER — ALBUTEROL 90 UG/1
2 AEROSOL, METERED ORAL EVERY 4 HOURS
Refills: 0 | Status: DISCONTINUED | OUTPATIENT
Start: 2022-11-30 | End: 2022-12-09

## 2022-11-30 RX ADMIN — ZOLPIDEM TARTRATE 5 MILLIGRAM(S): 10 TABLET ORAL at 22:31

## 2022-11-30 RX ADMIN — ATORVASTATIN CALCIUM 10 MILLIGRAM(S): 80 TABLET, FILM COATED ORAL at 22:26

## 2022-11-30 RX ADMIN — Medication 10 MILLIGRAM(S): at 10:31

## 2022-11-30 RX ADMIN — Medication 50 MILLIGRAM(S): at 10:31

## 2022-11-30 RX ADMIN — WARFARIN SODIUM 5 MILLIGRAM(S): 2.5 TABLET ORAL at 00:16

## 2022-11-30 RX ADMIN — Medication 650 MILLIGRAM(S): at 02:06

## 2022-11-30 RX ADMIN — Medication 20 MILLIGRAM(S): at 10:32

## 2022-11-30 RX ADMIN — ZOLPIDEM TARTRATE 5 MILLIGRAM(S): 10 TABLET ORAL at 02:06

## 2022-11-30 RX ADMIN — GABAPENTIN 800 MILLIGRAM(S): 400 CAPSULE ORAL at 22:27

## 2022-11-30 RX ADMIN — Medication 1: at 16:30

## 2022-11-30 RX ADMIN — Medication 100 MILLIEQUIVALENT(S): at 10:30

## 2022-11-30 RX ADMIN — Medication 40 MILLIEQUIVALENT(S): at 10:31

## 2022-11-30 RX ADMIN — MONTELUKAST 10 MILLIGRAM(S): 4 TABLET, CHEWABLE ORAL at 22:26

## 2022-11-30 RX ADMIN — ONDANSETRON 4 MILLIGRAM(S): 8 TABLET, FILM COATED ORAL at 15:37

## 2022-11-30 RX ADMIN — Medication 100 MILLIEQUIVALENT(S): at 12:43

## 2022-11-30 RX ADMIN — Medication 120 MILLIGRAM(S): at 22:27

## 2022-11-30 RX ADMIN — GABAPENTIN 800 MILLIGRAM(S): 400 CAPSULE ORAL at 06:19

## 2022-11-30 RX ADMIN — GABAPENTIN 800 MILLIGRAM(S): 400 CAPSULE ORAL at 12:58

## 2022-11-30 RX ADMIN — ZOLPIDEM TARTRATE 5 MILLIGRAM(S): 10 TABLET ORAL at 23:36

## 2022-11-30 RX ADMIN — Medication 100 GRAM(S): at 20:45

## 2022-11-30 RX ADMIN — Medication 120 MILLIGRAM(S): at 10:31

## 2022-11-30 RX ADMIN — Medication 30 MILLILITER(S): at 22:29

## 2022-11-30 RX ADMIN — CHOLESTYRAMINE 4 GRAM(S): 4 POWDER, FOR SUSPENSION ORAL at 10:32

## 2022-11-30 RX ADMIN — Medication 100 MILLIEQUIVALENT(S): at 15:38

## 2022-11-30 RX ADMIN — LISINOPRIL 2.5 MILLIGRAM(S): 2.5 TABLET ORAL at 10:31

## 2022-11-30 RX ADMIN — Medication 650 MILLIGRAM(S): at 03:15

## 2022-11-30 NOTE — PATIENT PROFILE ADULT - FALL HARM RISK - HARM RISK INTERVENTIONS

## 2022-11-30 NOTE — PROGRESS NOTE ADULT - ASSESSMENT
71 y/o F w/ PMH of a-fib (on coumadin), DM2, HFpEF, HTN, PVD, dyslipidemia, obesity, p/w SOB    *Acute on chronic diastolic CHF  (EF unknown)  -Lasix IV  -I/Os + Daily weights  -On BB  -Will start ACEi  -Cardio consult  -Remote Tele   -Echo  -CT chest - cardiomegaly and patchy groundglass opacities - pulmonary edema vs infection? (however no signs / symptoms of infection at this time, will monitor off antibiotics for now)    -COVID negative     *B/L LE wounds  -Vascular consult     *Multinodular thyroid  -F/u outpatient     *DM2  -Humalog ISS   -Diabetic diet     *H/o A-fib / HTn / PVD / dyslipidemia / obesity  -C/w home meds and f/u outpatient for further management if conditions remain stable during hospitalization    *Advance Directives  -States  is HCP. Denies limitations on resuscitation status     *DVT ppx   -On coumadin    Case d/w team on IDR. Plan d/w patient and  at the bedside.

## 2022-11-30 NOTE — PHYSICAL THERAPY INITIAL EVALUATION ADULT - ORIENTATION, REHAB EVAL
PRE-OP EVALUATION    Patient Name: Cirilo Cunha    Pre-op Diagnosis: PELVIC PAIN, OVARIAN REMNANT     Procedure(s):  LAPAROSCOPY, LYSIS OF ADHESIONS ,EVACUATION OF LEFT OVARIAN REMNANT. POSSIBLE RIGHT OVARIAN CYSTECTOMY, DOUBTFUL OOPHORECTOMY    Surgeon(s) Take by mouth. Disp:  Rfl:    Fluticasone Propionate 50 MCG/ACT Nasal Suspension Use 2 sprays each nostril once daily after shower. Stop if you develop nose bleeds.  Disp: 1 Inhaler Rfl: 0   Venlafaxine HCl  MG Oral Capsule SR 24 Hr Take 150 mg by elan 11/27/2017   CREATSERUM 0.66 11/27/2017    (H) 11/27/2017   CA 9.1 11/27/2017            Airway      Mallampati: II  Mouth opening: 3 FB  TM distance: 4 - 6 cm  Neck ROM: full Cardiovascular      Rhythm: regular  Rate: normal     Dental    No notabl oriented to person, place, time and situation

## 2022-11-30 NOTE — PHYSICAL THERAPY INITIAL EVALUATION ADULT - PERTINENT HX OF CURRENT PROBLEM, REHAB EVAL
71 y/o F w/ PMH of a-fib (on coumadin), DM2, HFpEF, HTN, PVD, dyslipidemia, obesity, p/w SOB. Patient states that SOB started 3 days ago. SOB is worse w/ exertion, and improves with rest. Also c/o worsening SOB w/ lying down. States that she in the hospital wound care center and was referred to ED due to her SOB. +B/L LE edema. Denies cough, runny nose, sore throat, fever, chills, nausea, vomiting, abdominal pain, diarrhea.

## 2022-11-30 NOTE — PROGRESS NOTE ADULT - SUBJECTIVE AND OBJECTIVE BOX
73 y/o F w/ PMH of a-fib (on coumadin), DM2, HFpEF, HTN, PVD, dyslipidemia, obesity, p/w SOB. Patient states that SOB started 3 days ago. SOB is worse w/ exertion, and improves with rest. Also c/o worsening SOB w/ lying down. States that she in the hospital wound care center and was referred to ED due to her SOB. +B/L LE edema. Denies cough, runny nose, sore throat, fever, chills, nausea, vomiting, abdominal pain, diarrhea.       11/30- patient was seen and examined. Denies SOB-feels tired. VSS    Vital Signs Last 24 Hrs  T(C): 36.9 (30 Nov 2022 07:27), Max: 37.1 (29 Nov 2022 16:25)  T(F): 98.4 (30 Nov 2022 07:27), Max: 98.8 (29 Nov 2022 21:16)  HR: 84 (30 Nov 2022 07:27) (80 - 104)  BP: 129/76 (30 Nov 2022 07:27) (113/52 - 146/68)  BP(mean): 65 (29 Nov 2022 23:27) (65 - 68)  RR: 20 (30 Nov 2022 07:27) (18 - 24)  SpO2: 94% (30 Nov 2022 07:27) (94% - 98%)    Parameters below as of 30 Nov 2022 07:27  Patient On (Oxygen Delivery Method): room air    ROS:   All 10 systems reviewed and found to be negative with the exception of what has been described above.     PE:  Constitutional: NAD, laying in bed  HEENT: NC/AT  Back: no tenderness  Respiratory: respirations even and non labored, diminished breath sounds   Cardiovascular: S1S2 regular, no murmurs  Abdomen: soft, not tender, not distended, positive BS  Genitourinary: voiding  Musculoskeletal: no muscle tenderness, no joint swelling or tenderness  Extremities: no pedal edema   Neurological: no focal deficits    11-30    140  |  105  |  13  ----------------------------<  90  2.9<LL>   |  27  |  0.50    Ca    8.8      30 Nov 2022 06:46    TPro  7.1  /  Alb  2.5<L>  /  TBili  0.6  /  DBili  x   /  AST  10<L>  /  ALT  14  /  AlkPhos  47  11-30                            12.2   7.52  )-----------( 316      ( 30 Nov 2022 06:46 )             37.8

## 2022-11-30 NOTE — CONSULT NOTE ADULT - SUBJECTIVE AND OBJECTIVE BOX
Ms. Kirby is a 72 year old F with PMH of a-fib(on coumadin), DM2, HFpEF, HTN, PVD, dyslipidemia, obesity, p/w SOB. Patient admitted to the floor for 3 days of SOB, currently is being worked up for pulmonary edema vs CHF by primary team. Vascular surgery team is consulted for her chronic bilateral shin wounds. She presented to ED a month ago septic from bilateral lower extremities cellulitis. Wounds of the lower extremities are all well healed with small one opening on each leg. chronic bilateral venous stasis dermatitis noticed. Pulses are palpable on bilateral DP, dopplerable on bilateral PT.      PHYSICAL EXAM:  - GENERAL: Alert and oriented x 3. No acute distress. obese  - EYES: EOMI. Anicteric.  - HENT: Moist mucous membranes. No scleral icterus. No cervical lymphadenopathy.  - LUNGS: Clear to auscultation bilaterally. No accessory muscle use.  - CARDIOVASCULAR: Regular rate and rhythm. No murmur. No JVD.  - ABDOMEN: Soft, obese, non-tender and non-distended. No palpable masses.  - EXTREMITIES: bilateral venous stasis dermatitis. Well-healed scars noticed on the bilateral shin. small, about 1.5cm in diameter on the right shin and 2cmx0.5cm long on the left ankle. Pulses are palpable on bilateral DP, dopplerable on bilateral PT.  - NEUROLOGIC: No focal neurological deficits. CN II-XII grossly intact, but not individually tested.  - PSYCHIATRIC: Cooperative. Appropriate mood and affect.              Vitals:  T(C): 36.7 ( @ 15:21), Max: 37.1 (11 @ 21:16)  HR: 79 ( @ 15:21) (79 - 104)  BP: 119/69 ( @ 15:21) (119/69 - 146/68)  RR: 18 ( @ 15:21) (18 - 24)  SpO2: 93% ( @ 15:21) (93% - 98%)       @ 07:01  -   @ 19:27  --------------------------------------------------------  IN:    IV PiggyBack: 200 mL  Total IN: 200 mL    OUT:  Total OUT: 0 mL    Total NET: 200 mL          11-30 @ 06:46                    12.2  CBC: 7.52>)-------(<316                     37.8                 140 | 105 | 13    CMP:  ----------------------< 90               2.9 | 27 | 0.50                      Ca:8.8  Phos:3.7  M.3               0.6|      |10        LFTs:  ------|47|-----             -|      |-  11-29 @ 18:24                    12.3  CBC: 10.40>)-------(<318                     38.0                 139 | 108 | 9    CMP:  ----------------------< 98               3.8 | 25 | 0.60                      Ca:8.9  Phos:-  Mg:-               0.4|      |9        LFTs:  ------|47|-----             -|      |-            Current Inpatient Medications:  acetaminophen     Tablet .. 650 milliGRAM(s) Oral every 6 hours PRN  albuterol    90 MICROgram(s) HFA Inhaler 2 Puff(s) Inhalation every 4 hours PRN  atorvastatin 10 milliGRAM(s) Oral at bedtime  cholestyramine Powder (Sugar-Free) 4 Gram(s) Oral two times a day  dextrose 5%. 1000 milliLiter(s) (100 mL/Hr) IV Continuous <Continuous>  dextrose 5%. 1000 milliLiter(s) (50 mL/Hr) IV Continuous <Continuous>  dextrose 50% Injectable 25 Gram(s) IV Push once  dextrose 50% Injectable 12.5 Gram(s) IV Push once  dextrose 50% Injectable 25 Gram(s) IV Push once  dextrose Oral Gel 15 Gram(s) Oral once PRN  furosemide   Injectable 20 milliGRAM(s) IV Push daily  gabapentin 800 milliGRAM(s) Oral three times a day  glucagon  Injectable 1 milliGRAM(s) IntraMuscular once  influenza  Vaccine (HIGH DOSE) 0.7 milliLiter(s) IntraMuscular once  insulin lispro (ADMELOG) corrective regimen sliding scale   SubCutaneous three times a day before meals  insulin lispro (ADMELOG) corrective regimen sliding scale   SubCutaneous at bedtime  lisinopril 2.5 milliGRAM(s) Oral daily  magnesium sulfate  IVPB 1 Gram(s) IV Intermittent once  metoprolol succinate ER 50 milliGRAM(s) Oral daily  montelukast 10 milliGRAM(s) Oral at bedtime  ondansetron Injectable 4 milliGRAM(s) IV Push every 6 hours PRN  predniSONE   Tablet 10 milliGRAM(s) Oral daily  verapamil 120 milliGRAM(s) Oral two times a day  zolpidem 5 milliGRAM(s) Oral at bedtime PRN

## 2022-11-30 NOTE — CONSULT NOTE ADULT - ASSESSMENT
A 72 year old F with PMH of a-fib(on coumadin), DM2, HFpEF, HTN, PVD, dyslipidemia, obesity, p/w SOB. Patient admitted to the floor for 3 days of SOB, currently is being worked up for pulmonary edema vs CHF by primary team. Vascular surgery team is consulted for her chronic bilateral shin wounds. She presented to ED a month ago septic from bilateral lower extremities cellulitis. Wounds of the lower extremities are all well healed with small one opening on each leg. chronic bilateral venous stasis dermatitis noticed. Pulses are palpable on bilateral DP, dopplerable on bilateral PT.    Recommendations:  - Patient's wound is small and healing well. previous shin wounds healed up well with fresh scars. Pulses were palpable on DP and dopplerable on PT.   - No acute vascular intervention warranted at this time.  - Patient has a prolonged history of venous stasis ulcers, recommend routine leg elevation& compression stockings(ACE wrap)  - Rest of the care as per primary team    Plan discussed with Dr. Yoo

## 2022-11-30 NOTE — H&P ADULT - ASSESSMENT
73 y/o F w/ PMH of a-fib (on coumadin), DM2, HFpEF, HTN, PVD, dyslipidemia, obesity, p/w SOB    *Acute on chronic diastolic CHF  (EF unknown)  -Lasix IV  -I/Os + Daily weights  -BB + ACEi  -Cardio consult  -Tele monitoring  -Echo  -CT chest - cardiomegaly and patchy groundglass opacities - pulmonary edema vs infection? -> will also start levaquin and monitor closely   -COVID negative     *B/L LE wounds  -Vascular consult     *Multinodular thyroid  -F/u outaptient     *DM2  -Humalog ISS   -Diabetic diet     *H/o A-fib / HTn / PVD / dyslipidemia / obesity  -C/w home meds and f/u outpatient for further management if conditions remain stable during hospitalization      *Advance Directives  -States  is HCP. Denies limitations on resucitation status     *DVT ppx   -On coumadin   73 y/o F w/ PMH of a-fib (on coumadin), DM2, HFpEF, HTN, PVD, dyslipidemia, obesity, p/w SOB    *Acute on chronic diastolic CHF  (EF unknown)  -Lasix IV  -I/Os + Daily weights  -On BB  -Will start ACEi  -Cardio consult  -Remote Tele   -Echo  -CT chest - cardiomegaly and patchy groundglass opacities - pulmonary edema vs infection? (however no signs / symptoms of infection at this time, will monitor off antibiotics for now)    -COVID negative     *B/L LE wounds  -Vascular consult     *Multinodular thyroid  -F/u outpatient     *DM2  -Humalog ISS   -Diabetic diet     *H/o A-fib / HTn / PVD / dyslipidemia / obesity  -C/w home meds and f/u outpatient for further management if conditions remain stable during hospitalization    *Advance Directives  -States  is HCP. Denies limitations on resuscitation status     *DVT ppx   -On coumadin

## 2022-11-30 NOTE — H&P ADULT - HISTORY OF PRESENT ILLNESS
71 y/o F w/ PMH of a-fib (on coumadin), DM2, HFpEF, HTN, PVD, dyslipidemia, obesity, p/w SOB. Patient states that SOB started 3 days ago. SOB is worse w/ exertion, and improves with rest. Also c/o worsening SOB w/ lying down. States that she in the hospital wound care center and was referred to ED due to her SOB. +B/L LE edema. Denies cough, runny nose, sore throat, fever, chills, nausea, vomiting, abdominal pain, diarrhea.       PSH: L knee surgery, medial meniscus repair     Social Hx: Tobacco - quit many years ago, etoh / drugs - denies     Family Hx: Mother - breast cancer / DM

## 2022-12-01 ENCOUNTER — TRANSCRIPTION ENCOUNTER (OUTPATIENT)
Age: 72
End: 2022-12-01

## 2022-12-01 LAB
ANION GAP SERPL CALC-SCNC: 5 MMOL/L — SIGNIFICANT CHANGE UP (ref 5–17)
APPEARANCE UR: ABNORMAL
APTT BLD: 31.5 SEC — SIGNIFICANT CHANGE UP (ref 27.5–35.5)
BILIRUB UR-MCNC: NEGATIVE — SIGNIFICANT CHANGE UP
BUN SERPL-MCNC: 15 MG/DL — SIGNIFICANT CHANGE UP (ref 7–23)
CALCIUM SERPL-MCNC: 8.6 MG/DL — SIGNIFICANT CHANGE UP (ref 8.5–10.1)
CHLORIDE SERPL-SCNC: 104 MMOL/L — SIGNIFICANT CHANGE UP (ref 96–108)
CO2 SERPL-SCNC: 29 MMOL/L — SIGNIFICANT CHANGE UP (ref 22–31)
COLOR SPEC: YELLOW — SIGNIFICANT CHANGE UP
CREAT SERPL-MCNC: 0.57 MG/DL — SIGNIFICANT CHANGE UP (ref 0.5–1.3)
DIFF PNL FLD: ABNORMAL
EGFR: 96 ML/MIN/1.73M2 — SIGNIFICANT CHANGE UP
GLUCOSE SERPL-MCNC: 92 MG/DL — SIGNIFICANT CHANGE UP (ref 70–99)
GLUCOSE UR QL: NEGATIVE — SIGNIFICANT CHANGE UP
HCT VFR BLD CALC: 39.7 % — SIGNIFICANT CHANGE UP (ref 34.5–45)
HGB BLD-MCNC: 12.5 G/DL — SIGNIFICANT CHANGE UP (ref 11.5–15.5)
INR BLD: 1.56 RATIO — HIGH (ref 0.88–1.16)
KETONES UR-MCNC: NEGATIVE — SIGNIFICANT CHANGE UP
LEUKOCYTE ESTERASE UR-ACNC: ABNORMAL
MCHC RBC-ENTMCNC: 30.1 PG — SIGNIFICANT CHANGE UP (ref 27–34)
MCHC RBC-ENTMCNC: 31.5 GM/DL — LOW (ref 32–36)
MCV RBC AUTO: 95.7 FL — SIGNIFICANT CHANGE UP (ref 80–100)
NITRITE UR-MCNC: POSITIVE
PH UR: 9 — HIGH (ref 5–8)
PLATELET # BLD AUTO: 345 K/UL — SIGNIFICANT CHANGE UP (ref 150–400)
POTASSIUM SERPL-MCNC: 3.5 MMOL/L — SIGNIFICANT CHANGE UP (ref 3.5–5.3)
POTASSIUM SERPL-SCNC: 3.5 MMOL/L — SIGNIFICANT CHANGE UP (ref 3.5–5.3)
PROT UR-MCNC: 15
PROTHROM AB SERPL-ACNC: 18.2 SEC — HIGH (ref 10.5–13.4)
RBC # BLD: 4.15 M/UL — SIGNIFICANT CHANGE UP (ref 3.8–5.2)
RBC # FLD: 13.2 % — SIGNIFICANT CHANGE UP (ref 10.3–14.5)
SARS-COV-2 RNA SPEC QL NAA+PROBE: SIGNIFICANT CHANGE UP
SODIUM SERPL-SCNC: 138 MMOL/L — SIGNIFICANT CHANGE UP (ref 135–145)
SP GR SPEC: 1.01 — SIGNIFICANT CHANGE UP (ref 1.01–1.02)
UROBILINOGEN FLD QL: NEGATIVE — SIGNIFICANT CHANGE UP
WBC # BLD: 9.53 K/UL — SIGNIFICANT CHANGE UP (ref 3.8–10.5)
WBC # FLD AUTO: 9.53 K/UL — SIGNIFICANT CHANGE UP (ref 3.8–10.5)

## 2022-12-01 PROCEDURE — 99233 SBSQ HOSP IP/OBS HIGH 50: CPT

## 2022-12-01 PROCEDURE — 71045 X-RAY EXAM CHEST 1 VIEW: CPT | Mod: 26

## 2022-12-01 RX ORDER — POTASSIUM CHLORIDE 20 MEQ
40 PACKET (EA) ORAL DAILY
Refills: 0 | Status: DISCONTINUED | OUTPATIENT
Start: 2022-12-01 | End: 2022-12-09

## 2022-12-01 RX ORDER — CEFTRIAXONE 500 MG/1
1000 INJECTION, POWDER, FOR SOLUTION INTRAMUSCULAR; INTRAVENOUS EVERY 24 HOURS
Refills: 0 | Status: COMPLETED | OUTPATIENT
Start: 2022-12-01 | End: 2022-12-03

## 2022-12-01 RX ORDER — ZOLPIDEM TARTRATE 10 MG/1
5 TABLET ORAL AT BEDTIME
Refills: 0 | Status: DISCONTINUED | OUTPATIENT
Start: 2022-12-01 | End: 2022-12-08

## 2022-12-01 RX ORDER — WARFARIN SODIUM 2.5 MG/1
5 TABLET ORAL DAILY
Refills: 0 | Status: COMPLETED | OUTPATIENT
Start: 2022-12-01 | End: 2022-12-03

## 2022-12-01 RX ORDER — FUROSEMIDE 40 MG
20 TABLET ORAL ONCE
Refills: 0 | Status: COMPLETED | OUTPATIENT
Start: 2022-12-01 | End: 2022-12-01

## 2022-12-01 RX ORDER — FUROSEMIDE 40 MG
40 TABLET ORAL DAILY
Refills: 0 | Status: DISCONTINUED | OUTPATIENT
Start: 2022-12-01 | End: 2022-12-02

## 2022-12-01 RX ADMIN — MONTELUKAST 10 MILLIGRAM(S): 4 TABLET, CHEWABLE ORAL at 22:16

## 2022-12-01 RX ADMIN — WARFARIN SODIUM 5 MILLIGRAM(S): 2.5 TABLET ORAL at 22:16

## 2022-12-01 RX ADMIN — Medication 1: at 16:52

## 2022-12-01 RX ADMIN — GABAPENTIN 800 MILLIGRAM(S): 400 CAPSULE ORAL at 22:18

## 2022-12-01 RX ADMIN — Medication 120 MILLIGRAM(S): at 22:15

## 2022-12-01 RX ADMIN — ZOLPIDEM TARTRATE 5 MILLIGRAM(S): 10 TABLET ORAL at 01:41

## 2022-12-01 RX ADMIN — Medication 20 MILLIGRAM(S): at 16:52

## 2022-12-01 RX ADMIN — GABAPENTIN 800 MILLIGRAM(S): 400 CAPSULE ORAL at 06:40

## 2022-12-01 RX ADMIN — ZOLPIDEM TARTRATE 5 MILLIGRAM(S): 10 TABLET ORAL at 23:35

## 2022-12-01 RX ADMIN — Medication 650 MILLIGRAM(S): at 20:25

## 2022-12-01 RX ADMIN — GABAPENTIN 800 MILLIGRAM(S): 400 CAPSULE ORAL at 14:03

## 2022-12-01 RX ADMIN — CEFTRIAXONE 1000 MILLIGRAM(S): 500 INJECTION, POWDER, FOR SOLUTION INTRAMUSCULAR; INTRAVENOUS at 14:03

## 2022-12-01 RX ADMIN — Medication 1 APPLICATION(S): at 18:30

## 2022-12-01 RX ADMIN — Medication 650 MILLIGRAM(S): at 19:58

## 2022-12-01 RX ADMIN — Medication 40 MILLIEQUIVALENT(S): at 12:04

## 2022-12-01 RX ADMIN — Medication 650 MILLIGRAM(S): at 11:00

## 2022-12-01 RX ADMIN — ATORVASTATIN CALCIUM 10 MILLIGRAM(S): 80 TABLET, FILM COATED ORAL at 22:16

## 2022-12-01 NOTE — DISCHARGE NOTE NURSING/CASE MANAGEMENT/SOCIAL WORK - NSDCPEFALRISK_GEN_ALL_CORE
For information on Fall & Injury Prevention, visit: [email or web address] OR  [email or web address] OR  https://www.cdc.gov/steadi/patient.html

## 2022-12-01 NOTE — DISCHARGE NOTE NURSING/CASE MANAGEMENT/SOCIAL WORK - NSPROMEDSBROUGHTTOHOSP_GEN_A_NUR
Diagnosis: POLYCYTHEMIA VERA    Pre-Phlebotomy: BP BP (!) 146/73   Pulse 83   Temp 97 °F (36.1 °C) (Infrared)   Resp 16   SpO2 100%       Post-Phlebotomy: /83, HR 88    Volume Removed: 864 grams    Complications: NONE    Comments: TOLERATED PHLEBOTOMY WELL.             LOT# [id number]    Exp: 5-23      Bernardino Thomas RN no

## 2022-12-01 NOTE — PROGRESS NOTE ADULT - SUBJECTIVE AND OBJECTIVE BOX
73 y/o F w/ PMH of a-fib (on coumadin), DM2, HFpEF, HTN, PVD, dyslipidemia, obesity, p/w SOB. Patient states that SOB started 3 days ago. SOB is worse w/ exertion, and improves with rest. Also c/o worsening SOB w/ lying down. States that she in the hospital wound care center and was referred to ED due to her SOB. +B/L LE edema. Denies cough, runny nose, sore throat, fever, chills, nausea, vomiting, abdominal pain, diarrhea.       11/30- patient was seen and examined. Denies SOB-feels tired. VSS  12/1- patient was seen and examined. VSS- no acute overnight events.      Vital Signs Last 24 Hrs  T(C): 37.9 (01 Dec 2022 10:21), Max: 37.9 (01 Dec 2022 10:21)  T(F): 100.2 (01 Dec 2022 10:21), Max: 100.2 (01 Dec 2022 10:21)  HR: 113 (01 Dec 2022 10:21) (78 - 113)  BP: 103/64 (01 Dec 2022 10:21) (95/59 - 126/79)  BP(mean): --  RR: 22 (01 Dec 2022 10:21) (17 - 22)  SpO2: 93% (01 Dec 2022 10:21) (92% - 93%)    Parameters below as of 01 Dec 2022 10:21  Patient On (Oxygen Delivery Method): room air        ROS:   All 10 systems reviewed and found to be negative with the exception of what has been described above.     PE:  Constitutional: NAD, laying in bed  HEENT: NC/AT  Back: no tenderness  Respiratory: respirations even and non labored, diminished breath sounds   Cardiovascular: S1S2 regular, no murmurs  Abdomen: soft, not tender, not distended, positive BS  Genitourinary: voiding  Musculoskeletal: no muscle tenderness, no joint swelling or tenderness  Extremities: no pedal edema   Neurological: no focal deficits    12-01    138  |  104  |  15  ----------------------------<  92  3.5   |  29  |  0.57    Ca    8.6      01 Dec 2022 07:04  Phos  3.7     11-30  Mg     1.3     11-30    TPro  7.1  /  Alb  2.5<L>  /  TBili  0.6  /  DBili  x   /  AST  10<L>  /  ALT  14  /  AlkPhos  47  11-30                          12.5   9.53  )-----------( 345      ( 01 Dec 2022 07:04 )             39.7       11-30    140  |  105  |  13  ----------------------------<  90  2.9<LL>   |  27  |  0.50    Ca    8.8      30 Nov 2022 06:46    TPro  7.1  /  Alb  2.5<L>  /  TBili  0.6  /  DBili  x   /  AST  10<L>  /  ALT  14  /  AlkPhos  47  11-30                            12.2   7.52  )-----------( 316      ( 30 Nov 2022 06:46 )             37.8     CT Angio Chest PE Protocol w/ IV Cont (11.29.22 @ 21:04) >   CT ANGIO CHEST PULM ART [facility]                        PROCEDURE DATE:  11/29/2022    INTERPRETATION:  CLINICAL INFORMATION: Chest pain  COMPARISON: CTA chest 6/9/2019  CONTRAST/COMPLICATIONS:  IV Contrast: Omnipaque 350  90 cc administered   10 cc discarded  Oral Contrast: NONE  Complications: None reported at time of study completion  PROCEDURE:  CT Angiography of the Chest.  Sagittal and coronal reformats were performed as well as 3D (MIP)   reconstructions.  FINDINGS:  LUNGS AND AIRWAYS: Patent central airways.  Patchy bilateral groundglass   opacities.  PLEURA: No pleural effusion.  MEDIASTINUM AND RACHEL: No lymphadenopathy.  VESSELS: No pulmonary embolus. Atherosclerotic ossifications of the aorta   and coronary arteries.  HEART: Cardiomegaly. No pericardial effusion.  CHEST WALL AND LOWER NECK: Multinodular thyroid again noted.  VISUALIZED UPPER ABDOMEN: Small hiatal hernia..  BONES: Degenerative changes.  IMPRESSION:  No pulmonary embolus.  Cardiomegaly and patchy groundglass opacities are nonspecific. Correlate   for pulmonary edema versus infection.         73 y/o F w/ PMH of a-fib (on coumadin), DM2, HFpEF, HTN, PVD, dyslipidemia, obesity, p/w SOB. Patient states that SOB started 3 days ago. SOB is worse w/ exertion, and improves with rest. Also c/o worsening SOB w/ lying down. States that she in the hospital wound care center and was referred to ED due to her SOB. +B/L LE edema. Denies cough, runny nose, sore throat, fever, chills, nausea, vomiting, abdominal pain, diarrhea.       11/30- patient was seen and examined. Denies SOB-feels tired. VSS  12/1- patient was seen and examined. VSS- no acute overnight events.      Vital Signs Last 24 Hrs  T(C): 37.9 (01 Dec 2022 10:21), Max: 37.9 (01 Dec 2022 10:21)  T(F): 100.2 (01 Dec 2022 10:21), Max: 100.2 (01 Dec 2022 10:21)  HR: 113 (01 Dec 2022 10:21) (78 - 113)  BP: 103/64 (01 Dec 2022 10:21) (95/59 - 126/79)  BP(mean): --  RR: 22 (01 Dec 2022 10:21) (17 - 22)  SpO2: 93% (01 Dec 2022 10:21) (92% - 93%)    Parameters below as of 01 Dec 2022 10:21  Patient On (Oxygen Delivery Method): room air        ROS:   All 10 systems reviewed and found to be negative with the exception of what has been described above.     PE:  Constitutional: NAD, laying in bed  HEENT: NC/AT  Back: no tenderness  Respiratory: respirations even and non labored, diminished breath sounds   Cardiovascular: S1S2 regular, no murmurs  Abdomen: soft, not tender, not distended, positive BS  Genitourinary: voiding  Musculoskeletal: no muscle tenderness, no joint swelling or tenderness  Extremities: no pedal edema   Neurological: no focal deficits    MEDICATIONS  (STANDING):  atorvastatin 10 milliGRAM(s) Oral at bedtime  cefTRIAXone Injectable. 1000 milliGRAM(s) IV Push every 24 hours  cholestyramine Powder (Sugar-Free) 4 Gram(s) Oral two times a day  dextrose 5%. 1000 milliLiter(s) (100 mL/Hr) IV Continuous <Continuous>  dextrose 5%. 1000 milliLiter(s) (50 mL/Hr) IV Continuous <Continuous>  dextrose 50% Injectable 25 Gram(s) IV Push once  dextrose 50% Injectable 12.5 Gram(s) IV Push once  dextrose 50% Injectable 25 Gram(s) IV Push once  furosemide   Injectable 40 milliGRAM(s) IV Push daily  gabapentin 800 milliGRAM(s) Oral three times a day  glucagon  Injectable 1 milliGRAM(s) IntraMuscular once  influenza  Vaccine (HIGH DOSE) 0.7 milliLiter(s) IntraMuscular once  insulin lispro (ADMELOG) corrective regimen sliding scale   SubCutaneous three times a day before meals  insulin lispro (ADMELOG) corrective regimen sliding scale   SubCutaneous at bedtime  metoprolol succinate ER 50 milliGRAM(s) Oral daily  montelukast 10 milliGRAM(s) Oral at bedtime  potassium chloride    Tablet ER 40 milliEquivalent(s) Oral daily  predniSONE   Tablet 10 milliGRAM(s) Oral daily  verapamil 120 milliGRAM(s) Oral two times a day  warfarin 5 milliGRAM(s) Oral daily    MEDICATIONS  (PRN):  acetaminophen     Tablet .. 650 milliGRAM(s) Oral every 6 hours PRN Temp greater or equal to 38C (100.4F), Mild Pain (1 - 3)  albuterol    90 MICROgram(s) HFA Inhaler 2 Puff(s) Inhalation every 4 hours PRN Shortness of Breath  aluminum hydroxide/magnesium hydroxide/simethicone Suspension 30 milliLiter(s) Oral every 4 hours PRN Dyspepsia  dextrose Oral Gel 15 Gram(s) Oral once PRN Blood Glucose LESS THAN 70 milliGRAM(s)/deciliter  ondansetron Injectable 4 milliGRAM(s) IV Push every 6 hours PRN Nausea and/or Vomiting  zolpidem 5 milliGRAM(s) Oral at bedtime PRN Insomnia      12-01    138  |  104  |  15  ----------------------------<  92  3.5   |  29  |  0.57    Ca    8.6      01 Dec 2022 07:04  Phos  3.7     11-30  Mg     1.3     11-30    TPro  7.1  /  Alb  2.5<L>  /  TBili  0.6  /  DBili  x   /  AST  10<L>  /  ALT  14  /  AlkPhos  47  11-30                          12.5   9.53  )-----------( 345      ( 01 Dec 2022 07:04 )             39.7       11-30    140  |  105  |  13  ----------------------------<  90  2.9<LL>   |  27  |  0.50    Ca    8.8      30 Nov 2022 06:46    TPro  7.1  /  Alb  2.5<L>  /  TBili  0.6  /  DBili  x   /  AST  10<L>  /  ALT  14  /  AlkPhos  47  11-30                            12.2   7.52  )-----------( 316      ( 30 Nov 2022 06:46 )             37.8     CT Angio Chest PE Protocol w/ IV Cont (11.29.22 @ 21:04) >   CT ANGIO CHEST PULM ART [facility]                        PROCEDURE DATE:  11/29/2022    INTERPRETATION:  CLINICAL INFORMATION: Chest pain  COMPARISON: CTA chest 6/9/2019  CONTRAST/COMPLICATIONS:  IV Contrast: Omnipaque 350  90 cc administered   10 cc discarded  Oral Contrast: NONE  Complications: None reported at time of study completion  PROCEDURE:  CT Angiography of the Chest.  Sagittal and coronal reformats were performed as well as 3D (MIP)   reconstructions.  FINDINGS:  LUNGS AND AIRWAYS: Patent central airways.  Patchy bilateral groundglass   opacities.  PLEURA: No pleural effusion.  MEDIASTINUM AND RACHEL: No lymphadenopathy.  VESSELS: No pulmonary embolus. Atherosclerotic ossifications of the aorta   and coronary arteries.  HEART: Cardiomegaly. No pericardial effusion.  CHEST WALL AND LOWER NECK: Multinodular thyroid again noted.  VISUALIZED UPPER ABDOMEN: Small hiatal hernia..  BONES: Degenerative changes.  IMPRESSION:  No pulmonary embolus.  Cardiomegaly and patchy groundglass opacities are nonspecific. Correlate   for pulmonary edema versus infection.

## 2022-12-01 NOTE — CDI QUERY NOTE - NSCDIOTHERTXTBX_GEN_ALL_CORE_HH
This patient is admitted with CHF and your clarification is needed regarding the type of atrial fibrillation:    Progress Note Adult Hospitalist NP 11-30-22 @ 14:56  73 y/o F w/ PMH of a-fib (on coumadin),   H/o A-fib / HTn / PVD / dyslipidemia / obesity  -C/w home meds and f/u outpatient for further management if conditions remain stable during hospitalization       < from: 12 Lead ECG (11.29.22 @ 16:25) >  Diagnosis Line Atrial fibrillation  Left axis deviation    Can the type of atrial fibrillation be specified?  -Chronic atrial fibrillation  -Permanent atrial fibrillation  -Other, please specify:  -unable to determine

## 2022-12-01 NOTE — DISCHARGE NOTE NURSING/CASE MANAGEMENT/SOCIAL WORK - PATIENT PORTAL LINK FT
You can access the FollowMyHealth Patient Portal offered by [facility] by registering at the following website: [email or web address]. By joining [employer]’s FollowMyHealth portal, you will also be able to view your health information using other applications (apps) compatible with our system.

## 2022-12-01 NOTE — CONSULT NOTE ADULT - SUBJECTIVE AND OBJECTIVE BOX
CHIEF COMPLAINT: Patient is a 72y old  Female who presents with a chief complaint of SOB (30 Nov 2022 18:55)      HPI: HPI:  71 y/o F w/ PMH of a-fib (on coumadin), DM2, HFpEF, HTN, PVD, dyslipidemia, obesity, moderate aortic stenosis, p/w SOB. Patient states that SOB started 3 days ago. SOB is worse w/ exertion, and improves with rest. Also c/o worsening SOB w/ lying down. States that she in the hospital wound care center and was referred to ED due to her SOB. +B/L LE edema. Denies cough, runny nose, sore throat, fever, chills, nausea, vomiting, abdominal pain, diarrhea.       PSH: L knee surgery, medial meniscus repair     Social Hx: Tobacco - quit many years ago, etoh / drugs - denies     Family Hx: Mother - breast cancer / DM    (30 Nov 2022 05:46)      PMHx: PAST MEDICAL & SURGICAL HISTORY:  Diabetes mellitus type II, controlled      Atrial fibrillation      Congestive heart failure      Dyspnea      Morbid obesity with BMI of 40.0-44.9, adult      Neuropathy      Peripheral venous insufficiency      Thyroid nodule      HTN (hypertension)      Cellulitis      History of esophagogastroduodenoscopy (EGD)      H/O colonoscopy      Other complications of gastric band procedure      S/P left knee arthroscopy      Status post medial meniscus repair            Soc Hx:     FAMILY HISTORY:  Family history of breast cancer in mother    Family history of diabetes mellitus in mother        Allergies: Allergies    [This allergen will not trigger allergy alert] IV DYE, IODINE CONTRAST (Unknown)  latex (Unknown)  penicillin (Hives)  pregabalin (Unknown)    Intolerances          REVIEW OF SYSTEMS:    As above  No chest pain + shortness of breath  No lightheadeness or syncope  No leg swelling  No palpitations  No claudication-like symptoms    Vital Signs Last 24 Hrs  T(C): 36.9 (30 Nov 2022 20:50), Max: 36.9 (30 Nov 2022 07:27)  T(F): 98.4 (30 Nov 2022 20:50), Max: 98.4 (30 Nov 2022 07:27)  HR: 78 (01 Dec 2022 05:40) (78 - 89)  BP: 126/79 (01 Dec 2022 05:40) (95/59 - 129/76)  BP(mean): --  RR: 17 (01 Dec 2022 05:40) (17 - 20)  SpO2: 92% (01 Dec 2022 05:40) (92% - 94%)    Parameters below as of 01 Dec 2022 05:40  Patient On (Oxygen Delivery Method): room air        I&O's Summary    30 Nov 2022 07:01  -  01 Dec 2022 07:00  --------------------------------------------------------  IN: 200 mL / OUT: 0 mL / NET: 200 mL        CAPILLARY BLOOD GLUCOSE      POCT Blood Glucose.: 97 mg/dL (01 Dec 2022 07:22)  POCT Blood Glucose.: 186 mg/dL (30 Nov 2022 22:20)  POCT Blood Glucose.: 181 mg/dL (30 Nov 2022 16:29)  POCT Blood Glucose.: 128 mg/dL (30 Nov 2022 11:57)  POCT Blood Glucose.: 96 mg/dL (30 Nov 2022 07:37)      PHYSICAL EXAM:   Patient in NAD  Neck: No JVD; Carotids:  2+ without bruits  Respiratory:  Clear to A&P  Cardiovascular: S1 and S2, regular rate and rhythm, no Murmurs, gallops or rubs  Gastrointestinal:  Soft, non-tender; BS positive  Extremities: No peripheral edema  Vascular: 2+ peripheral pulses  Neurological: A/O x 3, no focal deficits      MEDICATIONS:  MEDICATIONS  (STANDING):  atorvastatin 10 milliGRAM(s) Oral at bedtime  cholestyramine Powder (Sugar-Free) 4 Gram(s) Oral two times a day  dextrose 5%. 1000 milliLiter(s) (100 mL/Hr) IV Continuous <Continuous>  dextrose 5%. 1000 milliLiter(s) (50 mL/Hr) IV Continuous <Continuous>  dextrose 50% Injectable 25 Gram(s) IV Push once  dextrose 50% Injectable 12.5 Gram(s) IV Push once  dextrose 50% Injectable 25 Gram(s) IV Push once  furosemide   Injectable 20 milliGRAM(s) IV Push daily  gabapentin 800 milliGRAM(s) Oral three times a day  glucagon  Injectable 1 milliGRAM(s) IntraMuscular once  influenza  Vaccine (HIGH DOSE) 0.7 milliLiter(s) IntraMuscular once  insulin lispro (ADMELOG) corrective regimen sliding scale   SubCutaneous three times a day before meals  insulin lispro (ADMELOG) corrective regimen sliding scale   SubCutaneous at bedtime  lisinopril 2.5 milliGRAM(s) Oral daily  metoprolol succinate ER 50 milliGRAM(s) Oral daily  montelukast 10 milliGRAM(s) Oral at bedtime  predniSONE   Tablet 10 milliGRAM(s) Oral daily  verapamil 120 milliGRAM(s) Oral two times a day    Home Medications:  cholestyramine 4 g/5 g oral powder for reconstitution: 1 packet(s) orally 2 times a day (30 Nov 2022 05:51)  ezetimibe 10 mg oral tablet: 1 tab(s) orally once a day (in the evening) (30 Nov 2022 05:51)  gabapentin 800 mg oral tablet: 1 tab(s) orally 3 times a day (30 Nov 2022 05:51)  metFORMIN 1000 mg oral tablet: 1 tab(s) orally 2 times a day (30 Nov 2022 05:51)  metoprolol succinate 50 mg oral tablet, extended release: 1 tab(s) orally once a day (30 Nov 2022 05:51)  montelukast 10 mg oral tablet: 1 tab(s) orally once a day (at bedtime) (30 Nov 2022 05:51)  pravastatin 10 mg oral tablet: 1 tab(s) orally once a day (in the evening) (30 Nov 2022 05:51)  verapamil 120 mg oral tablet: 1 tab(s) orally 2 times a day (30 Nov 2022 05:51)  warfarin 5 mg oral tablet: 1 tab(s) orally once a day (in the evening) when OK   &#x27;ed by Dr. Mayer. (30 Nov 2022 05:51)  zolpidem 10 mg oral tablet: 1 tab(s) orally once a day (at bedtime) (30 Nov 2022 05:51)        LABS: All Labs Reviewed:  Blood Culture:   BNP Serum Pro-Brain Natriuretic Peptide: 1699 pg/mL (11-29 @ 18:24)    CBC                         Hemoglobin: 12.2 g/dL (11-30 @ 06:46)  Hemoglobin: 12.3 g/dL (11-29 @ 18:24)              Hematocrit: 37.8 % (11-30 @ 06:46)  Hematocrit: 38.0 % (11-29 @ 18:24)              Mean Cell Volume: 94.0 fl (11-30 @ 06:46)  Mean Cell Volume: 95.0 fl (11-29 @ 18:24)              Platelet Count - Automated: 316 K/uL (11-30 @ 06:46)  Platelet Count - Automated: 318 K/uL (11-29 @ 18:24)                            Cardiac markers   tropTroponin I, High Sensitivity (11.29.22 @ 18:24) Troponin I, High Sensitivity Result: 7.06                                       Chems        Sodium, Serum: 140 mmol/L (11-30 @ 06:46)  Sodium, Serum: 139 mmol/L (11-29 @ 18:24)          Potassium, Serum: 2.9 mmol/L (11-30 @ 06:46)  Potassium, Serum: 3.8 mmol/L (11-29 @ 18:24)          Blood Urea Nitrogen, Serum: 13 mg/dL (11-30 @ 06:46)  Blood Urea Nitrogen, Serum: 9 mg/dL (11-29 @ 18:24)          Creatinine 0.50  Creatinine 0.60          Magnesium, Serum: 1.3 mg/dL (11-30 @ 06:46)          Protein Total, Serum: 7.1 gm/dL (11-30 @ 06:46)  Protein Total, Serum: 7.2 gm/dL (11-29 @ 18:24)                  Calcium, Total Serum: 8.8 mg/dL (11-30 @ 06:46)  Calcium, Total Serum: 8.9 mg/dL (11-29 @ 18:24)          Phosphorus Level, Serum: 3.7 mg/dL (11-30 @ 06:46)          Bilirubin Total, Serum: 0.6 mg/dL (11-30 @ 06:46)  Bilirubin Total, Serum: 0.4 mg/dL (11-29 @ 18:24)          Alanine Aminotransferase (ALT/SGPT): 14 U/L (11-30 @ 06:46)  Alanine Aminotransferase (ALT/SGPT): 16 U/L (11-29 @ 18:24)          Aspartate Aminotransferase (AST/SGOT): 10 U/L (11-30 @ 06:46)  Aspartate Aminotransferase (AST/SGOT): 9 U/L (11-29 @ 18:24)                 INR: 1.70 ratio (11-30 @ 06:46)  INR: 1.66 ratio (11-29 @ 18:24)             RADIOLOGY:  < from: Xray Chest 1 View- PORTABLE-Urgent (11.29.22 @ 17:36) >  IMPRESSION:  1. Clear lungs with no acute cardiopulmonary abnormalities.  2. No significant change compared to the prior exam as discussed above.    < end of copied text >  < from: CT Angio Chest PE Protocol w/ IV Cont (11.29.22 @ 21:04) >  IMPRESSION:  No pulmonary embolus.    Cardiomegaly and patchy groundglass opacities are nonspecific. Correlate   for pulmonary edema versus infection.      < end of copied text >      EKG: < from: 12 Lead ECG (11.29.22 @ 16:25) >  Diagnosis Line Atrial fibrillation  Left axis deviation  Anterior infarct (cited on or before 12-OCT-2022)  Abnormal ECG  When compared with ECG of 12-OCT-2022 23:27,  No significant change was found  Confirmed by Ayo Smith MD ([id number]) on 11/30/2022 11:20:33 AM    < end of copied text >      Telemetry:    ECHO:  < from: TTE Echo Complete w/o Contrast w/ Doppler (11.30.22 @ 20:46) >   Summary     Endocardium is not well visualized, however, overall left ventricular   systolic function appears normal. Estimated left ventricular ejection   fraction is 55%.   Biatrial enlargement.   Moderate to severe aortic stenosis.   Mild tricuspid regurgitation.   Moderate pulmonary hypertension.     Signature     ----------------------------------------------------------------   Electronically signed by Onel Retana MD(Interpreting   physician) on 12/01/2022 04:14 AM    < end of copied text >         CHIEF COMPLAINT: Patient is a 72y old  Female who presents with a chief complaint of SOB (30 Nov 2022 18:55)      HPI: HPI:  73 y/o F w/ PMH of a-fib (on coumadin), DM2, HFpEF, HTN, PVD, dyslipidemia, obesity, moderate aortic stenosis, p/w SOB. Patient states that SOB started 3 days ago. SOB is worse w/ exertion, and improves with rest. Also c/o worsening SOB w/ lying down. States that she in the hospital wound care center and was referred to ED due to her SOB. +B/L LE edema. Denies cough, runny nose, sore throat, fever, chills, nausea, vomiting, abdominal pain, diarrhea.       PSH: L knee surgery, medial meniscus repair     Social Hx: Tobacco - quit many years ago, etoh / drugs - denies     Family Hx: Mother - breast cancer / DM    (30 Nov 2022 05:46)      PMHx: PAST MEDICAL & SURGICAL HISTORY:  Diabetes mellitus type II, controlled      Atrial fibrillation      Congestive heart failure      Dyspnea      Morbid obesity with BMI of 40.0-44.9, adult      Neuropathy      Peripheral venous insufficiency      Thyroid nodule      HTN (hypertension)      Cellulitis      History of esophagogastroduodenoscopy (EGD)      H/O colonoscopy      Other complications of gastric band procedure      S/P left knee arthroscopy      Status post medial meniscus repair            Soc Hx:     FAMILY HISTORY:  Family history of breast cancer in mother    Family history of diabetes mellitus in mother        Allergies: Allergies    [This allergen will not trigger allergy alert] IV DYE, IODINE CONTRAST (Unknown)  latex (Unknown)  penicillin (Hives)  pregabalin (Unknown)    Intolerances          REVIEW OF SYSTEMS:    As above  No chest pain + shortness of breath  No lightheadeness or syncope  No leg swelling  No palpitations  No claudication-like symptoms    Vital Signs Last 24 Hrs  T(C): 36.9 (30 Nov 2022 20:50), Max: 36.9 (30 Nov 2022 07:27)  T(F): 98.4 (30 Nov 2022 20:50), Max: 98.4 (30 Nov 2022 07:27)  HR: 78 (01 Dec 2022 05:40) (78 - 89)  BP: 126/79 (01 Dec 2022 05:40) (95/59 - 129/76)  BP(mean): --  RR: 17 (01 Dec 2022 05:40) (17 - 20)  SpO2: 92% (01 Dec 2022 05:40) (92% - 94%)    Parameters below as of 01 Dec 2022 05:40  Patient On (Oxygen Delivery Method): room air        I&O's Summary    30 Nov 2022 07:01  -  01 Dec 2022 07:00  --------------------------------------------------------  IN: 200 mL / OUT: 0 mL / NET: 200 mL        CAPILLARY BLOOD GLUCOSE      POCT Blood Glucose.: 97 mg/dL (01 Dec 2022 07:22)  POCT Blood Glucose.: 186 mg/dL (30 Nov 2022 22:20)  POCT Blood Glucose.: 181 mg/dL (30 Nov 2022 16:29)  POCT Blood Glucose.: 128 mg/dL (30 Nov 2022 11:57)  POCT Blood Glucose.: 96 mg/dL (30 Nov 2022 07:37)      PHYSICAL EXAM:   Patient in NAD  Neck: No JVD; Carotids:  2+ without bruits  Respiratory:  Clear to A&P  Cardiovascular: S1 and S2,syst m  Gastrointestinal:  Soft, non-tender; BS positive  Extremities: No peripheral edema  Vascular: 2+ peripheral pulses  Neurological: A/O x 3, no focal deficits      MEDICATIONS:  MEDICATIONS  (STANDING):  atorvastatin 10 milliGRAM(s) Oral at bedtime  cholestyramine Powder (Sugar-Free) 4 Gram(s) Oral two times a day  dextrose 5%. 1000 milliLiter(s) (100 mL/Hr) IV Continuous <Continuous>  dextrose 5%. 1000 milliLiter(s) (50 mL/Hr) IV Continuous <Continuous>  dextrose 50% Injectable 25 Gram(s) IV Push once  dextrose 50% Injectable 12.5 Gram(s) IV Push once  dextrose 50% Injectable 25 Gram(s) IV Push once  furosemide   Injectable 20 milliGRAM(s) IV Push daily  gabapentin 800 milliGRAM(s) Oral three times a day  glucagon  Injectable 1 milliGRAM(s) IntraMuscular once  influenza  Vaccine (HIGH DOSE) 0.7 milliLiter(s) IntraMuscular once  insulin lispro (ADMELOG) corrective regimen sliding scale   SubCutaneous three times a day before meals  insulin lispro (ADMELOG) corrective regimen sliding scale   SubCutaneous at bedtime  lisinopril 2.5 milliGRAM(s) Oral daily  metoprolol succinate ER 50 milliGRAM(s) Oral daily  montelukast 10 milliGRAM(s) Oral at bedtime  predniSONE   Tablet 10 milliGRAM(s) Oral daily  verapamil 120 milliGRAM(s) Oral two times a day    Home Medications:  cholestyramine 4 g/5 g oral powder for reconstitution: 1 packet(s) orally 2 times a day (30 Nov 2022 05:51)  ezetimibe 10 mg oral tablet: 1 tab(s) orally once a day (in the evening) (30 Nov 2022 05:51)  gabapentin 800 mg oral tablet: 1 tab(s) orally 3 times a day (30 Nov 2022 05:51)  metFORMIN 1000 mg oral tablet: 1 tab(s) orally 2 times a day (30 Nov 2022 05:51)  metoprolol succinate 50 mg oral tablet, extended release: 1 tab(s) orally once a day (30 Nov 2022 05:51)  montelukast 10 mg oral tablet: 1 tab(s) orally once a day (at bedtime) (30 Nov 2022 05:51)  pravastatin 10 mg oral tablet: 1 tab(s) orally once a day (in the evening) (30 Nov 2022 05:51)  verapamil 120 mg oral tablet: 1 tab(s) orally 2 times a day (30 Nov 2022 05:51)  warfarin 5 mg oral tablet: 1 tab(s) orally once a day (in the evening) when OK   &#x27;ed by Dr. Mayer. (30 Nov 2022 05:51)  zolpidem 10 mg oral tablet: 1 tab(s) orally once a day (at bedtime) (30 Nov 2022 05:51)        LABS: All Labs Reviewed:  Blood Culture:   BNP Serum Pro-Brain Natriuretic Peptide: 1699 pg/mL (11-29 @ 18:24)    CBC                         Hemoglobin: 12.2 g/dL (11-30 @ 06:46)  Hemoglobin: 12.3 g/dL (11-29 @ 18:24)              Hematocrit: 37.8 % (11-30 @ 06:46)  Hematocrit: 38.0 % (11-29 @ 18:24)              Mean Cell Volume: 94.0 fl (11-30 @ 06:46)  Mean Cell Volume: 95.0 fl (11-29 @ 18:24)              Platelet Count - Automated: 316 K/uL (11-30 @ 06:46)  Platelet Count - Automated: 318 K/uL (11-29 @ 18:24)                            Cardiac markers   tropTroponin I, High Sensitivity (11.29.22 @ 18:24) Troponin I, High Sensitivity Result: 7.06                                       Chems        Sodium, Serum: 140 mmol/L (11-30 @ 06:46)  Sodium, Serum: 139 mmol/L (11-29 @ 18:24)          Potassium, Serum: 2.9 mmol/L (11-30 @ 06:46)  Potassium, Serum: 3.8 mmol/L (11-29 @ 18:24)          Blood Urea Nitrogen, Serum: 13 mg/dL (11-30 @ 06:46)  Blood Urea Nitrogen, Serum: 9 mg/dL (11-29 @ 18:24)          Creatinine 0.50  Creatinine 0.60          Magnesium, Serum: 1.3 mg/dL (11-30 @ 06:46)          Protein Total, Serum: 7.1 gm/dL (11-30 @ 06:46)  Protein Total, Serum: 7.2 gm/dL (11-29 @ 18:24)                  Calcium, Total Serum: 8.8 mg/dL (11-30 @ 06:46)  Calcium, Total Serum: 8.9 mg/dL (11-29 @ 18:24)          Phosphorus Level, Serum: 3.7 mg/dL (11-30 @ 06:46)          Bilirubin Total, Serum: 0.6 mg/dL (11-30 @ 06:46)  Bilirubin Total, Serum: 0.4 mg/dL (11-29 @ 18:24)          Alanine Aminotransferase (ALT/SGPT): 14 U/L (11-30 @ 06:46)  Alanine Aminotransferase (ALT/SGPT): 16 U/L (11-29 @ 18:24)          Aspartate Aminotransferase (AST/SGOT): 10 U/L (11-30 @ 06:46)  Aspartate Aminotransferase (AST/SGOT): 9 U/L (11-29 @ 18:24)                 INR: 1.70 ratio (11-30 @ 06:46)  INR: 1.66 ratio (11-29 @ 18:24)             RADIOLOGY:  < from: Xray Chest 1 View- PORTABLE-Urgent (11.29.22 @ 17:36) >  IMPRESSION:  1. Clear lungs with no acute cardiopulmonary abnormalities.  2. No significant change compared to the prior exam as discussed above.    < end of copied text >  < from: CT Angio Chest PE Protocol w/ IV Cont (11.29.22 @ 21:04) >  IMPRESSION:  No pulmonary embolus.    Cardiomegaly and patchy groundglass opacities are nonspecific. Correlate   for pulmonary edema versus infection.      < end of copied text >      EKG: < from: 12 Lead ECG (11.29.22 @ 16:25) >  Diagnosis Line Atrial fibrillation  Left axis deviation  Anterior infarct (cited on or before 12-OCT-2022)  Abnormal ECG  When compared with ECG of 12-OCT-2022 23:27,  No significant change was found  Confirmed by Ayo Smith MD ([id number]) on 11/30/2022 11:20:33 AM    < end of copied text >      Telemetry:    ECHO:  < from: TTE Echo Complete w/o Contrast w/ Doppler (11.30.22 @ 20:46) >   Summary     Endocardium is not well visualized, however, overall left ventricular   systolic function appears normal. Estimated left ventricular ejection   fraction is 55%.   Biatrial enlargement.   Moderate to severe aortic stenosis.   Mild tricuspid regurgitation.   Moderate pulmonary hypertension.     Signature     ----------------------------------------------------------------   Electronically signed by Onel Retana MD(Interpreting   physician) on 12/01/2022 04:14 AM    < end of copied text >         CHIEF COMPLAINT: Patient is a 72y old  Female who presents with a chief complaint of SOB (30 Nov 2022 18:55)      HPI: HPI:  71 y/o F w/ PMH of a-fib (on coumadin), DM2, HFpEF, HTN, PVD, dyslipidemia, obesity, moderate aortic stenosis, p/w SOB. Patient states that SOB started 3 days ago. SOB is worse w/ exertion, and improves with rest. Also c/o worsening SOB w/ lying down. States that she in the hospital wound care center and was referred to ED due to her SOB. +B/L LE edema. Denies cough, runny nose, sore throat, fever, chills, nausea, vomiting, abdominal pain, diarrhea.       PSH: L knee surgery, medial meniscus repair     Social Hx: Tobacco - quit many years ago, etoh / drugs - denies     Family Hx: Mother - breast cancer / DM    (30 Nov 2022 05:46)      PMHx: PAST MEDICAL & SURGICAL HISTORY:  Diabetes mellitus type II, controlled      Atrial fibrillation      Congestive heart failure      Dyspnea      Morbid obesity with BMI of 40.0-44.9, adult      Neuropathy      Peripheral venous insufficiency      Thyroid nodule      HTN (hypertension)      Cellulitis      History of esophagogastroduodenoscopy (EGD)      H/O colonoscopy      Other complications of gastric band procedure      S/P left knee arthroscopy      Status post medial meniscus repair            Soc Hx:     FAMILY HISTORY:  Family history of breast cancer in mother    Family history of diabetes mellitus in mother        Allergies: Allergies    [This allergen will not trigger allergy alert] IV DYE, IODINE CONTRAST (Unknown)  latex (Unknown)  penicillin (Hives)  pregabalin (Unknown)    Intolerances          REVIEW OF SYSTEMS:    As above  No chest pain + shortness of breath  No lightheadeness or syncope  No leg swelling  No palpitations  No claudication-like symptoms    Vital Signs Last 24 Hrs  T(C): 36.9 (30 Nov 2022 20:50), Max: 36.9 (30 Nov 2022 07:27)  T(F): 98.4 (30 Nov 2022 20:50), Max: 98.4 (30 Nov 2022 07:27)  HR: 78 (01 Dec 2022 05:40) (78 - 89)  BP: 126/79 (01 Dec 2022 05:40) (95/59 - 129/76)  BP(mean): --  RR: 17 (01 Dec 2022 05:40) (17 - 20)  SpO2: 92% (01 Dec 2022 05:40) (92% - 94%)    Parameters below as of 01 Dec 2022 05:40  Patient On (Oxygen Delivery Method): room air        I&O's Summary    30 Nov 2022 07:01  -  01 Dec 2022 07:00  --------------------------------------------------------  IN: 200 mL / OUT: 0 mL / NET: 200 mL        CAPILLARY BLOOD GLUCOSE      POCT Blood Glucose.: 97 mg/dL (01 Dec 2022 07:22)  POCT Blood Glucose.: 186 mg/dL (30 Nov 2022 22:20)  POCT Blood Glucose.: 181 mg/dL (30 Nov 2022 16:29)  POCT Blood Glucose.: 128 mg/dL (30 Nov 2022 11:57)  POCT Blood Glucose.: 96 mg/dL (30 Nov 2022 07:37)      PHYSICAL EXAM:   Patient in NAD  Neck: No JVD; Carotids:  2+ without bruits  Respiratory:  Clear to A&P  Cardiovascular: S1 and S2,syst m  Gastrointestinal:  Soft, non-tender; BS positive  Extremities: No peripheral edema  Vascular: 2+ peripheral pulses  Neurological: A/O x 3, no focal deficits      MEDICATIONS:  MEDICATIONS  (STANDING):  atorvastatin 10 milliGRAM(s) Oral at bedtime  cholestyramine Powder (Sugar-Free) 4 Gram(s) Oral two times a day  dextrose 5%. 1000 milliLiter(s) (100 mL/Hr) IV Continuous <Continuous>  dextrose 5%. 1000 milliLiter(s) (50 mL/Hr) IV Continuous <Continuous>  dextrose 50% Injectable 25 Gram(s) IV Push once  dextrose 50% Injectable 12.5 Gram(s) IV Push once  dextrose 50% Injectable 25 Gram(s) IV Push once  furosemide   Injectable 20 milliGRAM(s) IV Push daily  gabapentin 800 milliGRAM(s) Oral three times a day  glucagon  Injectable 1 milliGRAM(s) IntraMuscular once  influenza  Vaccine (HIGH DOSE) 0.7 milliLiter(s) IntraMuscular once  insulin lispro (ADMELOG) corrective regimen sliding scale   SubCutaneous three times a day before meals  insulin lispro (ADMELOG) corrective regimen sliding scale   SubCutaneous at bedtime  lisinopril 2.5 milliGRAM(s) Oral daily  metoprolol succinate ER 50 milliGRAM(s) Oral daily  montelukast 10 milliGRAM(s) Oral at bedtime  predniSONE   Tablet 10 milliGRAM(s) Oral daily  verapamil 120 milliGRAM(s) Oral two times a day    Home Medications:  cholestyramine 4 g/5 g oral powder for reconstitution: 1 packet(s) orally 2 times a day (30 Nov 2022 05:51)  ezetimibe 10 mg oral tablet: 1 tab(s) orally once a day (in the evening) (30 Nov 2022 05:51)  gabapentin 800 mg oral tablet: 1 tab(s) orally 3 times a day (30 Nov 2022 05:51)  metFORMIN 1000 mg oral tablet: 1 tab(s) orally 2 times a day (30 Nov 2022 05:51)  metoprolol succinate 50 mg oral tablet, extended release: 1 tab(s) orally once a day (30 Nov 2022 05:51)  montelukast 10 mg oral tablet: 1 tab(s) orally once a day (at bedtime) (30 Nov 2022 05:51)  pravastatin 10 mg oral tablet: 1 tab(s) orally once a day (in the evening) (30 Nov 2022 05:51)  verapamil 120 mg oral tablet: 1 tab(s) orally 2 times a day (30 Nov 2022 05:51)  warfarin 5 mg oral tablet: 1 tab(s) orally once a day (in the evening) when OK   &#x27;ed by Dr. Mayer. (30 Nov 2022 05:51)  zolpidem 10 mg oral tablet: 1 tab(s) orally once a day (at bedtime) (30 Nov 2022 05:51)        LABS: All Labs Reviewed:  Blood Culture:   BNP Serum Pro-Brain Natriuretic Peptide: 1699 pg/mL (11-29 @ 18:24)    CBC                         Hemoglobin: 12.2 g/dL (11-30 @ 06:46)  Hemoglobin: 12.3 g/dL (11-29 @ 18:24)              Hematocrit: 37.8 % (11-30 @ 06:46)  Hematocrit: 38.0 % (11-29 @ 18:24)              Mean Cell Volume: 94.0 fl (11-30 @ 06:46)  Mean Cell Volume: 95.0 fl (11-29 @ 18:24)              Platelet Count - Automated: 316 K/uL (11-30 @ 06:46)  Platelet Count - Automated: 318 K/uL (11-29 @ 18:24)                            Cardiac markers   Troponin I, High Sensitivity (11.29.22 @ 18:24) Troponin I, High Sensitivity Result: 7.06                                       Chems        Sodium, Serum: 140 mmol/L (11-30 @ 06:46)  Sodium, Serum: 139 mmol/L (11-29 @ 18:24)          Potassium, Serum: 2.9 mmol/L (11-30 @ 06:46)  Potassium, Serum: 3.8 mmol/L (11-29 @ 18:24)          Blood Urea Nitrogen, Serum: 13 mg/dL (11-30 @ 06:46)  Blood Urea Nitrogen, Serum: 9 mg/dL (11-29 @ 18:24)          Creatinine 0.50  Creatinine 0.60          Magnesium, Serum: 1.3 mg/dL (11-30 @ 06:46)          Protein Total, Serum: 7.1 gm/dL (11-30 @ 06:46)  Protein Total, Serum: 7.2 gm/dL (11-29 @ 18:24)                  Calcium, Total Serum: 8.8 mg/dL (11-30 @ 06:46)  Calcium, Total Serum: 8.9 mg/dL (11-29 @ 18:24)          Phosphorus Level, Serum: 3.7 mg/dL (11-30 @ 06:46)          Bilirubin Total, Serum: 0.6 mg/dL (11-30 @ 06:46)  Bilirubin Total, Serum: 0.4 mg/dL (11-29 @ 18:24)          Alanine Aminotransferase (ALT/SGPT): 14 U/L (11-30 @ 06:46)  Alanine Aminotransferase (ALT/SGPT): 16 U/L (11-29 @ 18:24)          Aspartate Aminotransferase (AST/SGOT): 10 U/L (11-30 @ 06:46)  Aspartate Aminotransferase (AST/SGOT): 9 U/L (11-29 @ 18:24)                 INR: 1.70 ratio (11-30 @ 06:46)  INR: 1.66 ratio (11-29 @ 18:24)             RADIOLOGY:  < from: Xray Chest 1 View- PORTABLE-Urgent (11.29.22 @ 17:36) >  IMPRESSION:  1. Clear lungs with no acute cardiopulmonary abnormalities.  2. No significant change compared to the prior exam as discussed above.    < end of copied text >  < from: CT Angio Chest PE Protocol w/ IV Cont (11.29.22 @ 21:04) >  IMPRESSION:  No pulmonary embolus.    Cardiomegaly and patchy groundglass opacities are nonspecific. Correlate   for pulmonary edema versus infection.      < end of copied text >      EKG: < from: 12 Lead ECG (11.29.22 @ 16:25) >  Diagnosis Line Atrial fibrillation  Left axis deviation  Anterior infarct (cited on or before 12-OCT-2022)  Abnormal ECG  When compared with ECG of 12-OCT-2022 23:27,  No significant change was found  Confirmed by Ayo Smith MD ([id number]) on 11/30/2022 11:20:33 AM    < end of copied text >      Telemetry: afib with a moderate VR    ECHO:  < from: TTE Echo Complete w/o Contrast w/ Doppler (11.30.22 @ 20:46) >   Summary     Endocardium is not well visualized, however, overall left ventricular   systolic function appears normal. Estimated left ventricular ejection   fraction is 55%.   Biatrial enlargement.   Moderate to severe aortic stenosis.   Mild tricuspid regurgitation.   Moderate pulmonary hypertension.     Signature     ----------------------------------------------------------------   Electronically signed by Onel Retana MD(Interpreting   physician) on 12/01/2022 04:14 AM    < end of copied text >

## 2022-12-01 NOTE — PROGRESS NOTE ADULT - ASSESSMENT
71 y/o F w/ PMH of a-fib (on coumadin), DM2, HFpEF, HTN, PVD, dyslipidemia, obesity, p/w SOB    *Acute on chronic diastolic CHF  (EF unknown)  -Lasix IV- increase to 40mg as per Cardiology   -I/Os + Daily weights  -On BB  -Will start ACEi  -Cardio consult  -Echo results noted  -CT chest - cardiomegaly and patchy groundglass opacities - pulmonary edema vs infection? (however no signs / symptoms of infection at this time, will monitor off antibiotics for now)    -COVID negative       *UTI  - febrile  - +UA  - urine culture pending     *B/L LE wounds  -Vascular consult appreciated     *Multinodular thyroid  -F/u outpatient     *DM2  -Humalog ISS   -Diabetic diet     *H/o A-fib / HTn / PVD / dyslipidemia / obesity  -C/w home meds and f/u outpatient for further management if conditions remain stable during hospitalization    *Advance Directives  -States  is HCP. Denies limitations on resuscitation status     *DVT ppx   -On coumadin    Case d/w team on IDR. Plan d/w patient and  at the bedside.  71 y/o F w/ PMH of a-fib (on coumadin), DM2, HFpEF, HTN, PVD, dyslipidemia, obesity, p/w SOB    *Acute on chronic diastolic CHF  (EF unknown)  -Lasix IV- increase to 40mg as per Cardiology   -I/Os + Daily weights  -On BB  -Cardio consult  -Echo results noted  -CT chest - cardiomegaly and patchy groundglass opacities - pulmonary edema vs infection? (however no signs / symptoms of infection at this time, will monitor off antibiotics for now)    -COVID negative       *UTI  - febrile  - +UA  - urine culture pending     *B/L LE wounds  -Vascular consult appreciated     *Multinodular thyroid  -F/u outpatient     *DM2  -Humalog ISS   -Diabetic diet     *H/o A-fib / HTn / PVD / dyslipidemia / obesity  -C/w home meds and f/u outpatient for further management if conditions remain stable during hospitalization    *Advance Directives  -States  is HCP. Denies limitations on resuscitation status     *DVT ppx   -On coumadin    Case d/w team on IDR. Plan d/w patient and  at the bedside.

## 2022-12-01 NOTE — ED PROVIDER NOTE - PSYCHIATRIC, MLM
General
Alert and oriented to person, place, time/situation. normal mood and affect. no apparent risk to self or others.

## 2022-12-01 NOTE — CONSULT NOTE ADULT - ASSESSMENT
72 year old woman with the above PMH including normal LV systolic function and aortic stenosis, admitted with acute on chronic HFpEF.  I agree with furosemide therapy and will increase the dose to 40.  ACEI therapy not indicated especially with the aortic stenosis.  Will discontinue this.  Will start an SGLT2 inhibitor prior to discharge. 72 year old woman with the above PMH including normal LV systolic function and aortic stenosis, admitted with acute on chronic HFpEF.  I agree with furosemide therapy and will increase the dose to 40. I personally reviewed the TTE and I believe the aortic stenosis is more in the mild to moderate range.  ACEI therapy not indicated especially with the aortic stenosis.  Will discontinue this.  Will start an SGLT2 inhibitor prior to discharge.

## 2022-12-02 ENCOUNTER — TRANSCRIPTION ENCOUNTER (OUTPATIENT)
Age: 72
End: 2022-12-02

## 2022-12-02 LAB
ANION GAP SERPL CALC-SCNC: 5 MMOL/L — SIGNIFICANT CHANGE UP (ref 5–17)
APTT BLD: 32.5 SEC — SIGNIFICANT CHANGE UP (ref 27.5–35.5)
BUN SERPL-MCNC: 19 MG/DL — SIGNIFICANT CHANGE UP (ref 7–23)
CALCIUM SERPL-MCNC: 9.2 MG/DL — SIGNIFICANT CHANGE UP (ref 8.5–10.1)
CHLORIDE SERPL-SCNC: 106 MMOL/L — SIGNIFICANT CHANGE UP (ref 96–108)
CO2 SERPL-SCNC: 29 MMOL/L — SIGNIFICANT CHANGE UP (ref 22–31)
CREAT SERPL-MCNC: 0.6 MG/DL — SIGNIFICANT CHANGE UP (ref 0.5–1.3)
EGFR: 95 ML/MIN/1.73M2 — SIGNIFICANT CHANGE UP
GLUCOSE SERPL-MCNC: 96 MG/DL — SIGNIFICANT CHANGE UP (ref 70–99)
HCT VFR BLD CALC: 40.6 % — SIGNIFICANT CHANGE UP (ref 34.5–45)
HGB BLD-MCNC: 12.9 G/DL — SIGNIFICANT CHANGE UP (ref 11.5–15.5)
INR BLD: 1.44 RATIO — HIGH (ref 0.88–1.16)
MCHC RBC-ENTMCNC: 30.6 PG — SIGNIFICANT CHANGE UP (ref 27–34)
MCHC RBC-ENTMCNC: 31.8 GM/DL — LOW (ref 32–36)
MCV RBC AUTO: 96.2 FL — SIGNIFICANT CHANGE UP (ref 80–100)
PLATELET # BLD AUTO: 305 K/UL — SIGNIFICANT CHANGE UP (ref 150–400)
POTASSIUM SERPL-MCNC: 3.9 MMOL/L — SIGNIFICANT CHANGE UP (ref 3.5–5.3)
POTASSIUM SERPL-SCNC: 3.9 MMOL/L — SIGNIFICANT CHANGE UP (ref 3.5–5.3)
PROTHROM AB SERPL-ACNC: 16.8 SEC — HIGH (ref 10.5–13.4)
RBC # BLD: 4.22 M/UL — SIGNIFICANT CHANGE UP (ref 3.8–5.2)
RBC # FLD: 13.1 % — SIGNIFICANT CHANGE UP (ref 10.3–14.5)
SODIUM SERPL-SCNC: 140 MMOL/L — SIGNIFICANT CHANGE UP (ref 135–145)
WBC # BLD: 9.77 K/UL — SIGNIFICANT CHANGE UP (ref 3.8–10.5)
WBC # FLD AUTO: 9.77 K/UL — SIGNIFICANT CHANGE UP (ref 3.8–10.5)

## 2022-12-02 PROCEDURE — 99233 SBSQ HOSP IP/OBS HIGH 50: CPT

## 2022-12-02 RX ORDER — FUROSEMIDE 40 MG
40 TABLET ORAL DAILY
Refills: 0 | Status: DISCONTINUED | OUTPATIENT
Start: 2022-12-02 | End: 2022-12-09

## 2022-12-02 RX ORDER — WARFARIN SODIUM 2.5 MG/1
1 TABLET ORAL ONCE
Refills: 0 | Status: COMPLETED | OUTPATIENT
Start: 2022-12-02 | End: 2022-12-02

## 2022-12-02 RX ADMIN — WARFARIN SODIUM 5 MILLIGRAM(S): 2.5 TABLET ORAL at 22:28

## 2022-12-02 RX ADMIN — Medication 120 MILLIGRAM(S): at 22:28

## 2022-12-02 RX ADMIN — WARFARIN SODIUM 1 MILLIGRAM(S): 2.5 TABLET ORAL at 22:29

## 2022-12-02 RX ADMIN — Medication 650 MILLIGRAM(S): at 08:40

## 2022-12-02 RX ADMIN — Medication 1 APPLICATION(S): at 14:23

## 2022-12-02 RX ADMIN — GABAPENTIN 800 MILLIGRAM(S): 400 CAPSULE ORAL at 14:25

## 2022-12-02 RX ADMIN — Medication 50 MILLIGRAM(S): at 11:45

## 2022-12-02 RX ADMIN — Medication 40 MILLIGRAM(S): at 11:45

## 2022-12-02 RX ADMIN — Medication 650 MILLIGRAM(S): at 08:10

## 2022-12-02 RX ADMIN — Medication 40 MILLIEQUIVALENT(S): at 11:48

## 2022-12-02 RX ADMIN — GABAPENTIN 800 MILLIGRAM(S): 400 CAPSULE ORAL at 22:27

## 2022-12-02 RX ADMIN — CEFTRIAXONE 1000 MILLIGRAM(S): 500 INJECTION, POWDER, FOR SOLUTION INTRAMUSCULAR; INTRAVENOUS at 14:25

## 2022-12-02 RX ADMIN — Medication 120 MILLIGRAM(S): at 11:46

## 2022-12-02 RX ADMIN — CHOLESTYRAMINE 4 GRAM(S): 4 POWDER, FOR SUSPENSION ORAL at 11:45

## 2022-12-02 RX ADMIN — MONTELUKAST 10 MILLIGRAM(S): 4 TABLET, CHEWABLE ORAL at 22:28

## 2022-12-02 RX ADMIN — Medication 1: at 18:18

## 2022-12-02 RX ADMIN — Medication 10 MILLIGRAM(S): at 11:47

## 2022-12-02 RX ADMIN — ZOLPIDEM TARTRATE 5 MILLIGRAM(S): 10 TABLET ORAL at 00:35

## 2022-12-02 RX ADMIN — ATORVASTATIN CALCIUM 10 MILLIGRAM(S): 80 TABLET, FILM COATED ORAL at 22:29

## 2022-12-02 RX ADMIN — GABAPENTIN 800 MILLIGRAM(S): 400 CAPSULE ORAL at 05:46

## 2022-12-02 RX ADMIN — ZOLPIDEM TARTRATE 5 MILLIGRAM(S): 10 TABLET ORAL at 22:31

## 2022-12-02 NOTE — PROGRESS NOTE ADULT - ASSESSMENT
73 y/o F w/ PMH of a-fib (on coumadin), DM2, HFpEF, HTN, PVD, dyslipidemia, obesity, p/w SOB    *Acute on chronic diastolic CHF  (EF unknown)  -Lasix IV- increase to 40mg as per Cardiology   -I/Os + Daily weights  -On BB  -Cardio consult  -Echo results noted  -CT chest - cardiomegaly and patchy groundglass opacities - pulmonary edema vs infection? (however no signs / symptoms of infection at this time, will monitor off antibiotics for now)    -COVID negative       *UTI  - febrile  - +UA  - urine culture pending   - rocsuzette ribeiro#2     *B/L LE wounds  -Vascular consult appreciated     *Multinodular thyroid  -F/u outpatient     *DM2  -Humalog ISS   -Diabetic diet     *H/o A-fib / HTn / PVD / dyslipidemia / obesity  -C/w home meds and f/u outpatient for further management if conditions remain stable during hospitalization    *Advance Directives  -States  is HCP. Denies limitations on resuscitation status     *DVT ppx   -On coumadin    Case d/w team on IDR. Plan d/w patient and  at the bedside.  73 y/o F w/ PMH of a-fib (on coumadin), DM2, HFpEF, HTN, PVD, dyslipidemia, obesity, p/w SOB    *Acute on chronic diastolic CHF  (EF unknown)  -Lasix IV- increase to 40mg as per Cardiology   -I/Os + Daily weights  -On BB  -Cardio consult  -Echo results noted  -CT chest - cardiomegaly and patchy groundglass opacities - pulmonary edema vs infection? (however no signs / symptoms of infection at this time, will monitor off antibiotics for now)    -COVID negative       *UTI  - febrile  - +UA  - urine culture pending   - rocsuzette ribeiro#2     *B/L LE wounds  -Vascular consult appreciated     *Multinodular thyroid  -F/u outpatient     *DM2  -Humalog ISS   -Diabetic diet     *Permanent A-fib / HTn / PVD / dyslipidemia / obesity  -C/w home meds and f/u outpatient for further management if conditions remain stable during hospitalization    - coumadin   *Advance Directives  -States  is HCP. Denies limitations on resuscitation status     *DVT ppx   -On coumadin    Case d/w team on IDR. Plan d/w patient and  at the bedside.

## 2022-12-02 NOTE — DISCHARGE NOTE PROVIDER - HOSPITAL COURSE
71 y/o F w/ PMH of a-fib (on coumadin), DM2, HFpEF, HTN, PVD, dyslipidemia, obesity, p/w SOB. Patient states that SOB started 3 days ago. SOB is worse w/ exertion, and improves with rest. Also c/o worsening SOB w/ lying down. States that she in the hospital wound care center and was referred to ED due to her SOB. +B/L LE edema. Denies cough, runny nose, sore throat, fever, chills, nausea, vomiting, abdominal pain, diarrhea.   Patient admitted with   possible CHF exacerbation- cardiology consulted.   Hospital complicated with fever, +UTI and was started on IV antibiotics. 73 y/o F w/ PMH of a-fib (on coumadin), DM2, HFpEF, HTN, PVD, dyslipidemia, obesity, p/w SOB. Patient states that SOB started 3 days ago. SOB is worse w/ exertion, and improves with rest. Also c/o worsening SOB w/ lying down. States that she in the hospital wound care center and was referred to ED due to her SOB. +B/L LE edema. Denies cough, runny nose, sore throat, fever, chills, nausea, vomiting, abdominal pain, diarrhea.   Patient admitted with possible CHF exacerbation- cardiology consulted.   Hospital complicated with fever, +UTI and was started on IV antibiotics which she has completed.   Plan for dc home with home PT- Will need home O2 eval. 71 y/o F w/ PMH of a-fib (on coumadin), DM2, HFpEF, HTN, PVD, dyslipidemia, obesity, p/w SOB. Patient states that SOB started 3 days ago. SOB is worse w/ exertion, and improves with rest. Also c/o worsening SOB w/ lying down. States that she in the hospital wound care center and was referred to ED due to her SOB. +B/L LE edema. Denies cough, runny nose, sore throat, fever, chills, nausea, vomiting, abdominal pain, diarrhea.   Patient admitted with possible CHF exacerbation- cardiology consulted.   Hospital complicated with fever, +UTI and was started on IV antibiotics which she has completed.   Plan for dc home.      Vital Signs Last 24 Hrs  T(C): 37.1 (09 Dec 2022 07:54), Max: 37.1 (09 Dec 2022 07:54)  T(F): 98.7 (09 Dec 2022 07:54), Max: 98.7 (09 Dec 2022 07:54)  HR: 88 (09 Dec 2022 10:35) (88 - 109)  BP: 120/65 (09 Dec 2022 10:35) (112/56 - 126/69)  BP(mean): --  RR: 16 (09 Dec 2022 10:35) (16 - 18)  SpO2: 96% (09 Dec 2022 10:35) (92% - 96%)    Parameters below as of 09 Dec 2022 10:35  Patient On (Oxygen Delivery Method): room air          PHYSICAL EXAM:  Gen: NAD  HEENT:  pupils equal and reactive, EOMI, no oropharyngeal lesions, erythema, exudates, oral thrush  NECK:   supple, no carotid bruits, no palpable lymph nodes, no thyromegaly  CV:  +S1, +S2, regular, no murmurs or rubs  RESP:   lungs clear to auscultation bilaterally, no wheezing, rales, rhonchi, good air entry bilaterally  GI:  abdomen soft, non-tender, non-distended, normal BS, no bruits, no abdominal masses, no palpable masses  MSK:   normal muscle tone, no atrophy, no rigidity, no contractions  EXT:  no clubbing, no cyanosis, no edema, no calf pain, swelling or erythema  VASCULAR:  pulses equal and symmetric in the upper and lower extremities  NEURO:  AAOX3, no focal neurological deficits, follows all commands, able to move extremities spontaneously  SKIN:  no ulcers, lesions or rashes        Assessment and Plan:  71 y/o F w/ PMH of a-fib (on coumadin), DM2, HFpEF, HTN, PVD, dyslipidemia, obesity, p/w SOB, managed for acute on chronic HFpEF, also w ESBL UTI.    #Acute on chronic HFpEF  -now on RA at rest and w ambulation  -trop neg  -BNP 1700  -continue po lasix  -started Farxiga  -appreciate cards input (Leyla)    #ESBL Ecoli UTI  -UCx w proteus and ESBL Ecoli  -s/p 4d CTX, now for 3d total Meropenem completed  -appreciate ID input  -clotrimazole cream for vaginal discomfort/yeast infection       #B/L LE wounds  -Vascular consult appreciated, no acute vascular intervention needed    #DM2  -A1c 6.3  -no need for SSI, will d/c    #Afib, HTN, PVD, HLD  -home meds  -c/w coumadin for goal INR 2-3    Home today  Spent more than   30 min to prepare the discharge

## 2022-12-02 NOTE — PROGRESS NOTE ADULT - SUBJECTIVE AND OBJECTIVE BOX
73 y/o F w/ PMH of a-fib (on coumadin), DM2, HFpEF, HTN, PVD, dyslipidemia, obesity, p/w SOB. Patient states that SOB started 3 days ago. SOB is worse w/ exertion, and improves with rest. Also c/o worsening SOB w/ lying down. States that she in the hospital wound care center and was referred to ED due to her SOB. +B/L LE edema. Denies cough, runny nose, sore throat, fever, chills, nausea, vomiting, abdominal pain, diarrhea.       11/30- patient was seen and examined. Denies SOB-feels tired. VSS  12/1- patient was seen and examined. VSS- no acute overnight events.    12/2- as per patient report she had chills this AM and low grad fever. "feels very tired"- denies SOB.     Vital Signs Last 24 Hrs  T(C): 37.2 (02 Dec 2022 08:45), Max: 37.5 (02 Dec 2022 07:53)  T(F): 99 (02 Dec 2022 08:45), Max: 99.5 (02 Dec 2022 07:53)  HR: 95 (02 Dec 2022 11:43) (71 - 114)  BP: 121/74 (02 Dec 2022 11:43) (107/77 - 158/94)  BP(mean): --  RR: 18 (02 Dec 2022 08:45) (18 - 18)  SpO2: 99% (02 Dec 2022 08:45) (93% - 99%)    Parameters below as of 02 Dec 2022 08:45  Patient On (Oxygen Delivery Method): nasal cannula  O2 Flow (L/min): 2        ROS:   All 10 systems reviewed and found to be negative with the exception of what has been described above.     PE:  Constitutional: NAD, laying in bed  HEENT: NC/AT  Back: no tenderness  Respiratory: respirations even and non labored, diminished breath sounds   Cardiovascular: S1S2 regular, no murmurs  Abdomen: soft, not tender, not distended, positive BS  Genitourinary: voiding  Musculoskeletal: no muscle tenderness, no joint swelling or tenderness  Extremities: no pedal edema   Neurological: no focal deficits    MEDICATIONS  (STANDING):  atorvastatin 10 milliGRAM(s) Oral at bedtime  cefTRIAXone Injectable. 1000 milliGRAM(s) IV Push every 24 hours  cholestyramine Powder (Sugar-Free) 4 Gram(s) Oral two times a day  dextrose 5%. 1000 milliLiter(s) (100 mL/Hr) IV Continuous <Continuous>  dextrose 5%. 1000 milliLiter(s) (50 mL/Hr) IV Continuous <Continuous>  dextrose 50% Injectable 25 Gram(s) IV Push once  dextrose 50% Injectable 12.5 Gram(s) IV Push once  dextrose 50% Injectable 25 Gram(s) IV Push once  furosemide    Tablet 40 milliGRAM(s) Oral daily  gabapentin 800 milliGRAM(s) Oral three times a day  glucagon  Injectable 1 milliGRAM(s) IntraMuscular once  influenza  Vaccine (HIGH DOSE) 0.7 milliLiter(s) IntraMuscular once  insulin lispro (ADMELOG) corrective regimen sliding scale   SubCutaneous three times a day before meals  insulin lispro (ADMELOG) corrective regimen sliding scale   SubCutaneous at bedtime  metoprolol succinate ER 50 milliGRAM(s) Oral daily  montelukast 10 milliGRAM(s) Oral at bedtime  potassium chloride    Tablet ER 40 milliEquivalent(s) Oral daily  predniSONE   Tablet 10 milliGRAM(s) Oral daily  silver sulfADIAZINE 1% Cream 1 Application(s) Topical daily  verapamil 120 milliGRAM(s) Oral two times a day  warfarin 5 milliGRAM(s) Oral daily    MEDICATIONS  (PRN):  acetaminophen     Tablet .. 650 milliGRAM(s) Oral every 6 hours PRN Temp greater or equal to 38C (100.4F), Mild Pain (1 - 3)  albuterol    90 MICROgram(s) HFA Inhaler 2 Puff(s) Inhalation every 4 hours PRN Shortness of Breath  aluminum hydroxide/magnesium hydroxide/simethicone Suspension 30 milliLiter(s) Oral every 4 hours PRN Dyspepsia  dextrose Oral Gel 15 Gram(s) Oral once PRN Blood Glucose LESS THAN 70 milliGRAM(s)/deciliter  ondansetron Injectable 4 milliGRAM(s) IV Push every 6 hours PRN Nausea and/or Vomiting  zolpidem 5 milliGRAM(s) Oral at bedtime PRN Insomnia  zolpidem 5 milliGRAM(s) Oral at bedtime PRN Insomnia      12-02    140  |  106  |  19  ----------------------------<  96  3.9   |  29  |  0.60    Ca    9.2      02 Dec 2022 07:32                            12.9   9.77  )-----------( 305      ( 02 Dec 2022 07:32 )             40.6     12-01    138  |  104  |  15  ----------------------------<  92  3.5   |  29  |  0.57    Ca    8.6      01 Dec 2022 07:04  Phos  3.7     11-30  Mg     1.3     11-30    TPro  7.1  /  Alb  2.5<L>  /  TBili  0.6  /  DBili  x   /  AST  10<L>  /  ALT  14  /  AlkPhos  47  11-30                          12.5   9.53  )-----------( 345      ( 01 Dec 2022 07:04 )             39.7       11-30    140  |  105  |  13  ----------------------------<  90  2.9<LL>   |  27  |  0.50    Ca    8.8      30 Nov 2022 06:46    TPro  7.1  /  Alb  2.5<L>  /  TBili  0.6  /  DBili  x   /  AST  10<L>  /  ALT  14  /  AlkPhos  47  11-30                            12.2   7.52  )-----------( 316      ( 30 Nov 2022 06:46 )             37.8     CT Angio Chest PE Protocol w/ IV Cont (11.29.22 @ 21:04) >   CT ANGIO CHEST PULM [facility]                        PROCEDURE DATE:  11/29/2022    INTERPRETATION:  CLINICAL INFORMATION: Chest pain  COMPARISON: CTA chest 6/9/2019  CONTRAST/COMPLICATIONS:  IV Contrast: Omnipaque 350  90 cc administered   10 cc discarded  Oral Contrast: NONE  Complications: None reported at time of study completion  PROCEDURE:  CT Angiography of the Chest.  Sagittal and coronal reformats were performed as well as 3D (MIP)   reconstructions.  FINDINGS:  LUNGS AND AIRWAYS: Patent central airways.  Patchy bilateral groundglass   opacities.  PLEURA: No pleural effusion.  MEDIASTINUM AND RACHEL: No lymphadenopathy.  VESSELS: No pulmonary embolus. Atherosclerotic ossifications of the aorta   and coronary arteries.  HEART: Cardiomegaly. No pericardial effusion.  CHEST WALL AND LOWER NECK: Multinodular thyroid again noted.  VISUALIZED UPPER ABDOMEN: Small hiatal hernia..  BONES: Degenerative changes.  IMPRESSION:  No pulmonary embolus.  Cardiomegaly and patchy groundglass opacities are nonspecific. Correlate   for pulmonary edema versus infection.

## 2022-12-02 NOTE — PROGRESS NOTE ADULT - SUBJECTIVE AND OBJECTIVE BOX
CHIEF COMPLAINT: Patient is a 72y old  Female who presents with a chief complaint of SOB (30 Nov 2022 18:55)      HPI: HPI:  73 y/o F w/ PMH of a-fib (on coumadin), DM2, HFpEF, HTN, PVD, dyslipidemia, obesity, moderate aortic stenosis, p/w SOB. Patient states that SOB started 3 days ago. SOB is worse w/ exertion, and improves with rest. Also c/o worsening SOB w/ lying down. States that she in the hospital wound care center and was referred to ED due to her SOB. +B/L LE edema. Denies cough, runny nose, sore throat, fever, chills, nausea, vomiting, abdominal pain, diarrhea.       PSH: L knee surgery, medial meniscus repair     Social Hx: Tobacco - quit many years ago, etoh / drugs - denies     Family Hx: Mother - breast cancer / DM    (30 Nov 2022 05:46)      PMHx: PAST MEDICAL & SURGICAL HISTORY:  Diabetes mellitus type II, controlled      Atrial fibrillation      Congestive heart failure      Dyspnea      Morbid obesity with BMI of 40.0-44.9, adult      Neuropathy      Peripheral venous insufficiency      Thyroid nodule      HTN (hypertension)      Cellulitis      History of esophagogastroduodenoscopy (EGD)      H/O colonoscopy      Other complications of gastric band procedure      S/P left knee arthroscopy      Status post medial meniscus repair            Soc Hx:     FAMILY HISTORY:  Family history of breast cancer in mother    Family history of diabetes mellitus in mother        Allergies: Allergies    [This allergen will not trigger allergy alert] IV DYE, IODINE CONTRAST (Unknown)  latex (Unknown)  penicillin (Hives)  pregabalin (Unknown)    Intolerances    12/2/22:  Patient was started on antibiotics for an apparent UTI        REVIEW OF SYSTEMS:  Chills    ICU Vital Signs Last 24 Hrs  T(C): 36.6 (01 Dec 2022 20:49), Max: 37.9 (01 Dec 2022 10:21)  T(F): 97.9 (01 Dec 2022 20:49), Max: 100.2 (01 Dec 2022 10:21)  HR: 71 (01 Dec 2022 20:49) (71 - 113)  BP: 122/72 (01 Dec 2022 20:49) (103/64 - 122/72)  BP(mean): --  ABP: --  ABP(mean): --  RR: 18 (01 Dec 2022 20:49) (18 - 22)  SpO2: 97% (01 Dec 2022 20:49) (93% - 97%)    O2 Parameters below as of 01 Dec 2022 20:49  Patient On (Oxygen Delivery Method): nasal cannula  O2 Flow (L/min): 2              I&O's Summary    30 Nov 2022 07:01  -  01 Dec 2022 07:00  --------------------------------------------------------  IN: 200 mL / OUT: 0 mL / NET: 200 mL        CAPILLARY BLOOD GLUCOSE      POCT Blood Glucose.: 97 mg/dL (01 Dec 2022 07:22)  POCT Blood Glucose.: 186 mg/dL (30 Nov 2022 22:20)  POCT Blood Glucose.: 181 mg/dL (30 Nov 2022 16:29)  POCT Blood Glucose.: 128 mg/dL (30 Nov 2022 11:57)  POCT Blood Glucose.: 96 mg/dL (30 Nov 2022 07:37)      PHYSICAL EXAM:   Patient in NAD  Neck: No JVD; Carotids:  2+ without bruits  Respiratory:  Clear to A&P  Cardiovascular: S1 and S2,syst m  Gastrointestinal:  Soft, non-tender; BS positive  Extremities: No peripheral edema  Vascular: 2+ peripheral pulses  Neurological: A/O x 3, no focal deficits      MEDICATIONS  (STANDING):  atorvastatin 10 milliGRAM(s) Oral at bedtime  cefTRIAXone Injectable. 1000 milliGRAM(s) IV Push every 24 hours  cholestyramine Powder (Sugar-Free) 4 Gram(s) Oral two times a day  dextrose 5%. 1000 milliLiter(s) (100 mL/Hr) IV Continuous <Continuous>  dextrose 5%. 1000 milliLiter(s) (50 mL/Hr) IV Continuous <Continuous>  dextrose 50% Injectable 25 Gram(s) IV Push once  dextrose 50% Injectable 12.5 Gram(s) IV Push once  dextrose 50% Injectable 25 Gram(s) IV Push once  furosemide   Injectable 40 milliGRAM(s) IV Push daily  gabapentin 800 milliGRAM(s) Oral three times a day  glucagon  Injectable 1 milliGRAM(s) IntraMuscular once  influenza  Vaccine (HIGH DOSE) 0.7 milliLiter(s) IntraMuscular once  insulin lispro (ADMELOG) corrective regimen sliding scale   SubCutaneous three times a day before meals  insulin lispro (ADMELOG) corrective regimen sliding scale   SubCutaneous at bedtime  metoprolol succinate ER 50 milliGRAM(s) Oral daily  montelukast 10 milliGRAM(s) Oral at bedtime  potassium chloride    Tablet ER 40 milliEquivalent(s) Oral daily  predniSONE   Tablet 10 milliGRAM(s) Oral daily  silver sulfADIAZINE 1% Cream 1 Application(s) Topical daily  verapamil 120 milliGRAM(s) Oral two times a day  warfarin 5 milliGRAM(s) Oral daily      Home Medications:  cholestyramine 4 g/5 g oral powder for reconstitution: 1 packet(s) orally 2 times a day (30 Nov 2022 05:51)  ezetimibe 10 mg oral tablet: 1 tab(s) orally once a day (in the evening) (30 Nov 2022 05:51)  gabapentin 800 mg oral tablet: 1 tab(s) orally 3 times a day (30 Nov 2022 05:51)  metFORMIN 1000 mg oral tablet: 1 tab(s) orally 2 times a day (30 Nov 2022 05:51)  metoprolol succinate 50 mg oral tablet, extended release: 1 tab(s) orally once a day (30 Nov 2022 05:51)  montelukast 10 mg oral tablet: 1 tab(s) orally once a day (at bedtime) (30 Nov 2022 05:51)  pravastatin 10 mg oral tablet: 1 tab(s) orally once a day (in the evening) (30 Nov 2022 05:51)  verapamil 120 mg oral tablet: 1 tab(s) orally 2 times a day (30 Nov 2022 05:51)  warfarin 5 mg oral tablet: 1 tab(s) orally once a day (in the evening) when OK   &#x27;ed by Dr. Mayer. (30 Nov 2022 05:51)  zolpidem 10 mg oral tablet: 1 tab(s) orally once a day (at bedtime) (30 Nov 2022 05:51)        LABS: All Labs Reviewed:  Blood Culture:   BNP Serum Pro-Brain Natriuretic Peptide: 1699 pg/mL (11-29 @ 18:24)    CBC                         Hemoglobin: 12.2 g/dL (11-30 @ 06:46)  Hemoglobin: 12.3 g/dL (11-29 @ 18:24)              Hematocrit: 37.8 % (11-30 @ 06:46)  Hematocrit: 38.0 % (11-29 @ 18:24)              Mean Cell Volume: 94.0 fl (11-30 @ 06:46)  Mean Cell Volume: 95.0 fl (11-29 @ 18:24)              Platelet Count - Automated: 316 K/uL (11-30 @ 06:46)  Platelet Count - Automated: 318 K/uL (11-29 @ 18:24)                            Cardiac markers   Troponin I, High Sensitivity (11.29.22 @ 18:24) Troponin I, High Sensitivity Result: 7.06                                       Chems        Sodium, Serum: 140 mmol/L (11-30 @ 06:46)  Sodium, Serum: 139 mmol/L (11-29 @ 18:24)          Potassium, Serum: 2.9 mmol/L (11-30 @ 06:46)  Potassium, Serum: 3.8 mmol/L (11-29 @ 18:24)          Blood Urea Nitrogen, Serum: 13 mg/dL (11-30 @ 06:46)  Blood Urea Nitrogen, Serum: 9 mg/dL (11-29 @ 18:24)          Creatinine 0.50  Creatinine 0.60          Magnesium, Serum: 1.3 mg/dL (11-30 @ 06:46)          Protein Total, Serum: 7.1 gm/dL (11-30 @ 06:46)  Protein Total, Serum: 7.2 gm/dL (11-29 @ 18:24)                  Calcium, Total Serum: 8.8 mg/dL (11-30 @ 06:46)  Calcium, Total Serum: 8.9 mg/dL (11-29 @ 18:24)          Phosphorus Level, Serum: 3.7 mg/dL (11-30 @ 06:46)          Bilirubin Total, Serum: 0.6 mg/dL (11-30 @ 06:46)  Bilirubin Total, Serum: 0.4 mg/dL (11-29 @ 18:24)          Alanine Aminotransferase (ALT/SGPT): 14 U/L (11-30 @ 06:46)  Alanine Aminotransferase (ALT/SGPT): 16 U/L (11-29 @ 18:24)          Aspartate Aminotransferase (AST/SGOT): 10 U/L (11-30 @ 06:46)  Aspartate Aminotransferase (AST/SGOT): 9 U/L (11-29 @ 18:24)                 INR: 1.70 ratio (11-30 @ 06:46)  INR: 1.66 ratio (11-29 @ 18:24)             RADIOLOGY:  < from: Xray Chest 1 View- PORTABLE-Urgent (11.29.22 @ 17:36) >  IMPRESSION:  1. Clear lungs with no acute cardiopulmonary abnormalities.  2. No significant change compared to the prior exam as discussed above.    < end of copied text >  < from: CT Angio Chest PE Protocol w/ IV Cont (11.29.22 @ 21:04) >  IMPRESSION:  No pulmonary embolus.    Cardiomegaly and patchy groundglass opacities are nonspecific. Correlate   for pulmonary edema versus infection.      < end of copied text >      EKG: < from: 12 Lead ECG (11.29.22 @ 16:25) >  Diagnosis Line Atrial fibrillation  Left axis deviation  Anterior infarct (cited on or before 12-OCT-2022)  Abnormal ECG  When compared with ECG of 12-OCT-2022 23:27,  No significant change was found  Confirmed by Ayo Smith MD ([id number]) on 11/30/2022 11:20:33 AM    < end of copied text >      Telemetry: afib with a moderate VR    ECHO:  < from: TTE Echo Complete w/o Contrast w/ Doppler (11.30.22 @ 20:46) >   Summary     Endocardium is not well visualized, however, overall left ventricular   systolic function appears normal. Estimated left ventricular ejection   fraction is 55%.   Biatrial enlargement.   Moderate to severe aortic stenosis.   Mild tricuspid regurgitation.   Moderate pulmonary hypertension.     Signature     ----------------------------------------------------------------   Electronically signed by Onel Retana MD(Interpreting   physician) on 12/01/2022 04:14 AM    < end of copied text >

## 2022-12-02 NOTE — DISCHARGE NOTE PROVIDER - CARE PROVIDER_API CALL
Justo Mayer  CARDIOVASCULAR DISEASE  [address], [address]  [place] [address]  Phone: [phone]  Fax: [phone]  Follow Up Time:     Delbert Martinez)  Internal Medicine; Pulmonary Disease  [address]  [place] [address]  Phone: [phone]  Fax: [phone]  Follow Up Time:

## 2022-12-02 NOTE — DISCHARGE NOTE PROVIDER - NSDCFUSCHEDAPPT_GEN_ALL_CORE_FT
Delbert Martinez  [facility]  [facility] [address]  Scheduled Appointment: 12/07/2022    [facility]  [facility] [address]  Scheduled Appointment: 12/07/2022

## 2022-12-02 NOTE — DISCHARGE NOTE PROVIDER - NSDCMRMEDTOKEN_GEN_ALL_CORE_FT
cholestyramine 4 g/5 g oral powder for reconstitution: 1 packet(s) orally 2 times a day  ezetimibe 10 mg oral tablet: 1 tab(s) orally once a day (in the evening)  gabapentin 800 mg oral tablet: 1 tab(s) orally 3 times a day  metFORMIN 1000 mg oral tablet: 1 tab(s) orally 2 times a day  metoprolol succinate 50 mg oral tablet, extended release: 1 tab(s) orally once a day  montelukast 10 mg oral tablet: 1 tab(s) orally once a day (at bedtime)  pravastatin 10 mg oral tablet: 1 tab(s) orally once a day (in the evening)  predniSONE 10 mg oral tablet: 2 tab(s) orally once a day for 3 days  1 tb po qd after  f/up with Dr Yoo re: further dose adjutments  Toprol-XL 50 mg oral tablet, extended release: 1 tab(s) orally once a day   verapamil 120 mg oral tablet: 1 tab(s) orally 2 times a day  warfarin 5 mg oral tablet: 1 tab(s) orally once a day (in the evening) when OK   &#x27;ed by Dr. Mayer.  zolpidem 10 mg oral tablet: 1 tab(s) orally once a day (at bedtime)   albuterol 90 mcg/inh inhalation aerosol: 2 puff(s) inhaled every 4 hours, As needed, Shortness of Breath  cholestyramine 4 g/5 g oral powder for reconstitution: 1 packet(s) orally 2 times a day  ezetimibe 10 mg oral tablet: 1 tab(s) orally once a day (in the evening)  furosemide 40 mg oral tablet: 1 tab(s) orally once a day  gabapentin 400 mg oral capsule: 2 cap(s) orally 3 times a day  metFORMIN 1000 mg oral tablet: 1 tab(s) orally 2 times a day  metoprolol succinate 50 mg oral tablet, extended release: 1 tab(s) orally once a day  montelukast 10 mg oral tablet: 1 tab(s) orally once a day (at bedtime)  potassium chloride 20 mEq oral tablet, extended release: 1 tab(s) orally once a day   pravastatin 10 mg oral tablet: 1 tab(s) orally once a day (in the evening)  predniSONE 10 mg oral tablet: 1 tab(s) orally once a day  silver sulfADIAZINE 1% topical cream: 1 application topically once a day  verapamil 120 mg oral tablet: 1 tab(s) orally 2 times a day  warfarin 5 mg oral tablet: 1 tab(s) orally once a day (in the evening)   zolpidem 10 mg oral tablet: 1 tab(s) orally once a day (at bedtime)   albuterol 90 mcg/inh inhalation aerosol: 2 puff(s) inhaled every 4 hours, As needed, Shortness of Breath  cholestyramine 4 g/5 g oral powder for reconstitution: 1 packet(s) orally 2 times a day  dapagliflozin 10 mg oral tablet: 1 tab(s) orally every 24 hours  ezetimibe 10 mg oral tablet: 1 tab(s) orally once a day (in the evening)  furosemide 40 mg oral tablet: 1 tab(s) orally once a day  gabapentin 400 mg oral capsule: 2 cap(s) orally 3 times a day  metFORMIN 1000 mg oral tablet: 1 tab(s) orally 2 times a day  metoprolol succinate 50 mg oral tablet, extended release: 1 tab(s) orally once a day  montelukast 10 mg oral tablet: 1 tab(s) orally once a day (at bedtime)  potassium chloride 20 mEq oral tablet, extended release: 1 tab(s) orally once a day   pravastatin 10 mg oral tablet: 1 tab(s) orally once a day (in the evening)  predniSONE 10 mg oral tablet: 1 tab(s) orally once a day  silver sulfADIAZINE 1% topical cream: 1 application topically once a day  verapamil 120 mg oral tablet: 1 tab(s) orally 2 times a day  warfarin 5 mg oral tablet: 1 tab(s) orally once a day (in the evening)   zolpidem 10 mg oral tablet: 1 tab(s) orally once a day (at bedtime)

## 2022-12-02 NOTE — PROGRESS NOTE ADULT - ASSESSMENT
72 year old woman with the above PMH including normal LV systolic function and aortic stenosis, admitted with acute on chronic HFpEF.  I agree with furosemide therapy and will increase the dose to 40. I personally reviewed the TTE and I believe the aortic stenosis is more in the mild to moderate range.  ACEI therapy not indicated especially with the aortic stenosis.  Will discontinue this.  Will start an SGLT2 inhibitor prior to discharge.    12/2/22:  Cardiac status stable but patient now has an apparent UTI.  On Ceftriaxone.  Will change furosemide to PO

## 2022-12-02 NOTE — DISCHARGE NOTE PROVIDER - ATTENDING DISCHARGE PHYSICAL EXAMINATION:
12/5 - no acute overnight events. VSS.     ROS:   All 10 systems reviewed and found to be negative with the exception of what has been described above.    Vital Signs Last 24 Hrs  T(C): 36.7 (05 Dec 2022 08:23), Max: 36.9 (04 Dec 2022 16:09)  T(F): 98.1 (05 Dec 2022 08:23), Max: 98.4 (04 Dec 2022 16:09)  HR: 76 (05 Dec 2022 10:17) (72 - 90)  BP: 122/71 (05 Dec 2022 10:17) (120/67 - 162/86)  BP(mean): --  RR: 18 (05 Dec 2022 08:23) (16 - 18)  SpO2: 100% (05 Dec 2022 08:23) (94% - 100%)    Parameters below as of 05 Dec 2022 08:23  Patient On (Oxygen Delivery Method): nasal cannula  O2 Flow (L/min): 2        PE:  Constitutional: NAD, laying in bed  HEENT: NC/AT  Back: no tenderness  Respiratory: respirations even and non labored, diminished breath sounds   Cardiovascular: S1S2 regular, no murmurs  Abdomen: soft, not tender, not distended, positive BS  Genitourinary: voiding  Musculoskeletal: no muscle tenderness, no joint swelling or tenderness  Extremities: LE edema improved  Neurological: no focal deficits

## 2022-12-02 NOTE — DISCHARGE NOTE PROVIDER - NSDCCPCAREPLAN_GEN_ALL_CORE_FT
PRINCIPAL DISCHARGE DIAGNOSIS  Diagnosis: Acute CHF  Assessment and Plan of Treatment:        PRINCIPAL DISCHARGE DIAGNOSIS  Diagnosis: Acute CHF  Assessment and Plan of Treatment: f/u with Dr Mayer as an outpt  c/w lasbinu- discuss with your cardiologist if you are eligible for farxiga or sprinolactone for CHF  weigh yourself daily.  *UTI- completed antibiotic tretment - no furher antibiotic required,   *BLE wounds  vascular consult appreciated.   *Multinodular thyroid  - outpatient f/u

## 2022-12-03 LAB
-  AMIKACIN: SIGNIFICANT CHANGE UP
-  AMOXICILLIN/CLAVULANIC ACID: SIGNIFICANT CHANGE UP
-  AMPICILLIN/SULBACTAM: SIGNIFICANT CHANGE UP
-  AMPICILLIN: SIGNIFICANT CHANGE UP
-  AZTREONAM: SIGNIFICANT CHANGE UP
-  CEFAZOLIN: SIGNIFICANT CHANGE UP
-  CEFEPIME: SIGNIFICANT CHANGE UP
-  CEFOXITIN: SIGNIFICANT CHANGE UP
-  CEFTRIAXONE: SIGNIFICANT CHANGE UP
-  CIPROFLOXACIN: SIGNIFICANT CHANGE UP
-  ERTAPENEM: SIGNIFICANT CHANGE UP
-  GENTAMICIN: SIGNIFICANT CHANGE UP
-  LEVOFLOXACIN: SIGNIFICANT CHANGE UP
-  MEROPENEM: SIGNIFICANT CHANGE UP
-  NITROFURANTOIN: SIGNIFICANT CHANGE UP
-  PIPERACILLIN/TAZOBACTAM: SIGNIFICANT CHANGE UP
-  TOBRAMYCIN: SIGNIFICANT CHANGE UP
-  TRIMETHOPRIM/SULFAMETHOXAZOLE: SIGNIFICANT CHANGE UP
ANION GAP SERPL CALC-SCNC: 6 MMOL/L — SIGNIFICANT CHANGE UP (ref 5–17)
APTT BLD: 31.6 SEC — SIGNIFICANT CHANGE UP (ref 27.5–35.5)
BUN SERPL-MCNC: 21 MG/DL — SIGNIFICANT CHANGE UP (ref 7–23)
CALCIUM SERPL-MCNC: 8.6 MG/DL — SIGNIFICANT CHANGE UP (ref 8.5–10.1)
CHLORIDE SERPL-SCNC: 107 MMOL/L — SIGNIFICANT CHANGE UP (ref 96–108)
CO2 SERPL-SCNC: 25 MMOL/L — SIGNIFICANT CHANGE UP (ref 22–31)
CREAT SERPL-MCNC: 0.56 MG/DL — SIGNIFICANT CHANGE UP (ref 0.5–1.3)
EGFR: 96 ML/MIN/1.73M2 — SIGNIFICANT CHANGE UP
GLUCOSE SERPL-MCNC: 97 MG/DL — SIGNIFICANT CHANGE UP (ref 70–99)
HCT VFR BLD CALC: 41.2 % — SIGNIFICANT CHANGE UP (ref 34.5–45)
HGB BLD-MCNC: 13.1 G/DL — SIGNIFICANT CHANGE UP (ref 11.5–15.5)
INR BLD: 1.52 RATIO — HIGH (ref 0.88–1.16)
MCHC RBC-ENTMCNC: 30.5 PG — SIGNIFICANT CHANGE UP (ref 27–34)
MCHC RBC-ENTMCNC: 31.8 GM/DL — LOW (ref 32–36)
MCV RBC AUTO: 96 FL — SIGNIFICANT CHANGE UP (ref 80–100)
METHOD TYPE: SIGNIFICANT CHANGE UP
PLATELET # BLD AUTO: 309 K/UL — SIGNIFICANT CHANGE UP (ref 150–400)
POTASSIUM SERPL-MCNC: 3.8 MMOL/L — SIGNIFICANT CHANGE UP (ref 3.5–5.3)
POTASSIUM SERPL-SCNC: 3.8 MMOL/L — SIGNIFICANT CHANGE UP (ref 3.5–5.3)
PROTHROM AB SERPL-ACNC: 17.7 SEC — HIGH (ref 10.5–13.4)
RBC # BLD: 4.29 M/UL — SIGNIFICANT CHANGE UP (ref 3.8–5.2)
RBC # FLD: 13.1 % — SIGNIFICANT CHANGE UP (ref 10.3–14.5)
SODIUM SERPL-SCNC: 138 MMOL/L — SIGNIFICANT CHANGE UP (ref 135–145)
WBC # BLD: 8.43 K/UL — SIGNIFICANT CHANGE UP (ref 3.8–10.5)
WBC # FLD AUTO: 8.43 K/UL — SIGNIFICANT CHANGE UP (ref 3.8–10.5)

## 2022-12-03 PROCEDURE — 99233 SBSQ HOSP IP/OBS HIGH 50: CPT

## 2022-12-03 RX ADMIN — WARFARIN SODIUM 5 MILLIGRAM(S): 2.5 TABLET ORAL at 21:43

## 2022-12-03 RX ADMIN — Medication 120 MILLIGRAM(S): at 09:57

## 2022-12-03 RX ADMIN — Medication 650 MILLIGRAM(S): at 00:35

## 2022-12-03 RX ADMIN — ZOLPIDEM TARTRATE 5 MILLIGRAM(S): 10 TABLET ORAL at 00:08

## 2022-12-03 RX ADMIN — Medication 1 APPLICATION(S): at 11:25

## 2022-12-03 RX ADMIN — Medication 650 MILLIGRAM(S): at 00:07

## 2022-12-03 RX ADMIN — Medication 1: at 18:15

## 2022-12-03 RX ADMIN — Medication 650 MILLIGRAM(S): at 10:25

## 2022-12-03 RX ADMIN — CEFTRIAXONE 1000 MILLIGRAM(S): 500 INJECTION, POWDER, FOR SOLUTION INTRAMUSCULAR; INTRAVENOUS at 13:22

## 2022-12-03 RX ADMIN — CHOLESTYRAMINE 4 GRAM(S): 4 POWDER, FOR SUSPENSION ORAL at 09:57

## 2022-12-03 RX ADMIN — Medication 50 MILLIGRAM(S): at 09:57

## 2022-12-03 RX ADMIN — GABAPENTIN 800 MILLIGRAM(S): 400 CAPSULE ORAL at 13:22

## 2022-12-03 RX ADMIN — Medication 40 MILLIGRAM(S): at 09:58

## 2022-12-03 RX ADMIN — GABAPENTIN 800 MILLIGRAM(S): 400 CAPSULE ORAL at 05:17

## 2022-12-03 RX ADMIN — ZOLPIDEM TARTRATE 5 MILLIGRAM(S): 10 TABLET ORAL at 21:42

## 2022-12-03 RX ADMIN — Medication 30 MILLILITER(S): at 12:48

## 2022-12-03 RX ADMIN — Medication 120 MILLIGRAM(S): at 21:44

## 2022-12-03 RX ADMIN — GABAPENTIN 800 MILLIGRAM(S): 400 CAPSULE ORAL at 21:43

## 2022-12-03 RX ADMIN — Medication 650 MILLIGRAM(S): at 11:23

## 2022-12-03 RX ADMIN — Medication 40 MILLIEQUIVALENT(S): at 11:25

## 2022-12-03 RX ADMIN — ATORVASTATIN CALCIUM 10 MILLIGRAM(S): 80 TABLET, FILM COATED ORAL at 21:43

## 2022-12-03 RX ADMIN — MONTELUKAST 10 MILLIGRAM(S): 4 TABLET, CHEWABLE ORAL at 21:43

## 2022-12-03 RX ADMIN — Medication 10 MILLIGRAM(S): at 10:01

## 2022-12-03 NOTE — PROGRESS NOTE ADULT - ASSESSMENT
71 y/o F w/ PMH of a-fib (on coumadin), DM2, HFpEF, HTN, PVD, dyslipidemia, obesity, p/w SOB    *Acute on chronic diastolic CHF  (EF unknown)  -Lasix IV 40mg now on 40 mg po dialy  -I/Os + Daily weights  -On BB  -Cardio consult appreciated   -Echo results noted  -CT chest - cardiomegaly and patchy groundglass opacities - pulmonary edema vs infection? (however no signs / symptoms of infection at this time, will monitor off antibiotics for now)    -COVID negative       *UTI  - febrile  - +UA  - urine culture proteus mirabilis - sens pending   - rocepcapri ribeiro#3    *B/L LE wounds  -Vascular consult appreciated     *Multinodular thyroid  -F/u outpatient     *DM2  -Humalog ISS   -Diabetic diet     *Permanent A-fib / HTn / PVD / dyslipidemia / obesity  -C/w home meds and f/u outpatient for further management if conditions remain stable during hospitalization    - coumadin   *Advance Directives  -States  is HCP. Denies limitations on resuscitation status     *DVT ppx   -On coumadin    Case d/w team on IDR. Plan d/w patient and  at the bedside.

## 2022-12-03 NOTE — PROGRESS NOTE ADULT - SUBJECTIVE AND OBJECTIVE BOX
73 y/o F w/ PMH of a-fib (on coumadin), DM2, HFpEF, HTN, PVD, dyslipidemia, obesity, p/w SOB. Patient states that SOB started 3 days ago. SOB is worse w/ exertion, and improves with rest. Also c/o worsening SOB w/ lying down. States that she in the hospital wound care center and was referred to ED due to her SOB. +B/L LE edema. Denies cough, runny nose, sore throat, fever, chills, nausea, vomiting, abdominal pain, diarrhea.       11/30- patient was seen and examined. Denies SOB-feels tired. VSS  12/1- patient was seen and examined. VSS- no acute overnight events.    12/2- as per patient report she had chills this AM and low grad fever. "feels very tired"- denies SOB.   12/3 - pt with chills this AM. no fever     Vital Signs Last 24 Hrs  T(C): 37.4 (03 Dec 2022 10:12), Max: 37.4 (02 Dec 2022 22:25)  T(F): 99.3 (03 Dec 2022 10:12), Max: 99.3 (02 Dec 2022 22:25)  HR: 102 (03 Dec 2022 10:12) (80 - 102)  BP: 126/81 (03 Dec 2022 10:12) (115/77 - 180/75)  BP(mean): --  RR: 19 (03 Dec 2022 10:12) (18 - 19)  SpO2: 98% (03 Dec 2022 10:12) (95% - 99%)    Parameters below as of 03 Dec 2022 10:12  Patient On (Oxygen Delivery Method): room air        ROS:   All 10 systems reviewed and found to be negative with the exception of what has been described above.     PE:  Constitutional: NAD, laying in bed  HEENT: NC/AT  Back: no tenderness  Respiratory: respirations even and non labored, diminished breath sounds   Cardiovascular: S1S2 regular, no murmurs  Abdomen: soft, not tender, not distended, positive BS  Genitourinary: voiding  Musculoskeletal: no muscle tenderness, no joint swelling or tenderness  Extremities: no pedal edema   Neurological: no focal deficits                                13.1   8.43  )-----------( 309      ( 03 Dec 2022 07:14 )             41.2     12-03    138  |  107  |  21  ----------------------------<  97  3.8   |  25  |  0.56    Ca    8.6      03 Dec 2022 07:14            PT/INR - ( 03 Dec 2022 07:14 )   PT: 17.7 sec;   INR: 1.52 ratio         PTT - ( 03 Dec 2022 07:14 )  PTT:31.6 sec          CT Angio Chest PE Protocol w/ IV Cont (11.29.22 @ 21:04) >   CT ANGIO CHEST PULM ART [facility]                        PROCEDURE DATE:  11/29/2022    INTERPRETATION:  CLINICAL INFORMATION: Chest pain  COMPARISON: CTA chest 6/9/2019  CONTRAST/COMPLICATIONS:  IV Contrast: Omnipaque 350  90 cc administered   10 cc discarded  Oral Contrast: NONE  Complications: None reported at time of study completion  PROCEDURE:  CT Angiography of the Chest.  Sagittal and coronal reformats were performed as well as 3D (MIP)   reconstructions.  FINDINGS:  LUNGS AND AIRWAYS: Patent central airways.  Patchy bilateral groundglass   opacities.  PLEURA: No pleural effusion.  MEDIASTINUM AND RACHEL: No lymphadenopathy.  VESSELS: No pulmonary embolus. Atherosclerotic ossifications of the aorta   and coronary arteries.  HEART: Cardiomegaly. No pericardial effusion.  CHEST WALL AND LOWER NECK: Multinodular thyroid again noted.  VISUALIZED UPPER ABDOMEN: Small hiatal hernia..  BONES: Degenerative changes.  IMPRESSION:  No pulmonary embolus.  Cardiomegaly and patchy groundglass opacities are nonspecific. Correlate   for pulmonary edema versus infection.    < from: TTE Echo Complete w/Doppler (05.14.19 @ 18:07) >  Summary:   1. Left ventricular ejection fraction, by visual estimation, is 50 to   55%.   2. Low normal global left ventricular systolic function.   3. The left ventricular diastolic function could not be assessed in this   study.   4. Moderate to severe left atrial enlargement.   5. Mildly enlarged right atrium.   6. Moderate mitral annular calcification.   7. Thickening and calcification of the anterior and posterior mitral   valve leaflets.   8. Mild mitral valve regurgitation.   9. Peak transaortic gradient equals 30.4 mmHg, mean transaortic gradient   equals 15.8 mmHg, the calculated aortic valve area equals 1.35 cm² by the   continuity equation consistent with moderate aortic stenosis.  10. Estimated pulmonary artery systolic pressure is 36.6 mmHg assuming a   right atrial pressure of 3 mmHg, which is consistent with borderline   pulmonary hypertension.  11. There is no evidence of pericardial effusion.  12. Endocardial visualization was enhanced with intravenous echo contrast.    MD Billy Electronically signed on 5/15/2019 at 8:56:19 AM            < end of copied text >

## 2022-12-04 LAB
HCT VFR BLD CALC: 37.2 % — SIGNIFICANT CHANGE UP (ref 34.5–45)
HGB BLD-MCNC: 11.7 G/DL — SIGNIFICANT CHANGE UP (ref 11.5–15.5)
INR BLD: 1.87 RATIO — HIGH (ref 0.88–1.16)
MCHC RBC-ENTMCNC: 30 PG — SIGNIFICANT CHANGE UP (ref 27–34)
MCHC RBC-ENTMCNC: 31.5 GM/DL — LOW (ref 32–36)
MCV RBC AUTO: 95.4 FL — SIGNIFICANT CHANGE UP (ref 80–100)
PLATELET # BLD AUTO: 331 K/UL — SIGNIFICANT CHANGE UP (ref 150–400)
PROTHROM AB SERPL-ACNC: 21.8 SEC — HIGH (ref 10.5–13.4)
RBC # BLD: 3.9 M/UL — SIGNIFICANT CHANGE UP (ref 3.8–5.2)
RBC # FLD: 13.2 % — SIGNIFICANT CHANGE UP (ref 10.3–14.5)
WBC # BLD: 9.02 K/UL — SIGNIFICANT CHANGE UP (ref 3.8–10.5)
WBC # FLD AUTO: 9.02 K/UL — SIGNIFICANT CHANGE UP (ref 3.8–10.5)

## 2022-12-04 PROCEDURE — 99232 SBSQ HOSP IP/OBS MODERATE 35: CPT

## 2022-12-04 RX ORDER — WARFARIN SODIUM 2.5 MG/1
6 TABLET ORAL AT BEDTIME
Refills: 0 | Status: DISCONTINUED | OUTPATIENT
Start: 2022-12-04 | End: 2022-12-09

## 2022-12-04 RX ADMIN — CHOLESTYRAMINE 4 GRAM(S): 4 POWDER, FOR SUSPENSION ORAL at 10:02

## 2022-12-04 RX ADMIN — Medication 650 MILLIGRAM(S): at 19:25

## 2022-12-04 RX ADMIN — Medication 120 MILLIGRAM(S): at 10:02

## 2022-12-04 RX ADMIN — MONTELUKAST 10 MILLIGRAM(S): 4 TABLET, CHEWABLE ORAL at 22:21

## 2022-12-04 RX ADMIN — Medication 1: at 21:15

## 2022-12-04 RX ADMIN — Medication 650 MILLIGRAM(S): at 08:08

## 2022-12-04 RX ADMIN — Medication 10 MILLIGRAM(S): at 10:02

## 2022-12-04 RX ADMIN — Medication 40 MILLIEQUIVALENT(S): at 12:00

## 2022-12-04 RX ADMIN — WARFARIN SODIUM 6 MILLIGRAM(S): 2.5 TABLET ORAL at 21:01

## 2022-12-04 RX ADMIN — Medication 650 MILLIGRAM(S): at 20:25

## 2022-12-04 RX ADMIN — ZOLPIDEM TARTRATE 5 MILLIGRAM(S): 10 TABLET ORAL at 22:21

## 2022-12-04 RX ADMIN — Medication 2: at 17:26

## 2022-12-04 RX ADMIN — GABAPENTIN 800 MILLIGRAM(S): 400 CAPSULE ORAL at 21:00

## 2022-12-04 RX ADMIN — Medication 50 MILLIGRAM(S): at 10:02

## 2022-12-04 RX ADMIN — Medication 650 MILLIGRAM(S): at 09:04

## 2022-12-04 RX ADMIN — ATORVASTATIN CALCIUM 10 MILLIGRAM(S): 80 TABLET, FILM COATED ORAL at 21:01

## 2022-12-04 RX ADMIN — ZOLPIDEM TARTRATE 5 MILLIGRAM(S): 10 TABLET ORAL at 21:01

## 2022-12-04 RX ADMIN — GABAPENTIN 800 MILLIGRAM(S): 400 CAPSULE ORAL at 14:14

## 2022-12-04 RX ADMIN — GABAPENTIN 800 MILLIGRAM(S): 400 CAPSULE ORAL at 05:10

## 2022-12-04 RX ADMIN — Medication 40 MILLIGRAM(S): at 10:02

## 2022-12-04 RX ADMIN — Medication 120 MILLIGRAM(S): at 21:00

## 2022-12-04 RX ADMIN — Medication 1 APPLICATION(S): at 13:07

## 2022-12-04 NOTE — PROGRESS NOTE ADULT - ASSESSMENT
71 y/o F w/ PMH of a-fib (on coumadin), DM2, HFpEF, HTN, PVD, dyslipidemia, obesity, p/w SOB    *Acute on chronic diastolic CHF   -Lasix IV 40mg now on 40 mg po daily  -I/Os + Daily weights  -On BB  -Cardio consult appreciated   -Echo results noted  -CT chest - cardiomegaly and patchy groundglass opacities - pulmonary edema vs infection? (however no signs / symptoms of infection at this time, will monitor off antibiotics for now)    -COVID negative       *UTI  - febrile  - +UA  - urine culture proteus mirabilis - sens ceft  - rocephin d#4 will not need abx upon dc     *B/L LE wounds  -Vascular consult appreciated     *Multinodular thyroid  -F/u outpatient     *DM2  -Humalog ISS   -Diabetic diet     *Permanent A-fib / HTn / PVD / dyslipidemia / obesity  -C/w home meds and f/u outpatient for further management if conditions remain stable during hospitalization    - coumadin   *Advance Directives  -States  is HCP. Denies limitations on resuscitation status     *DVT ppx   -On coumadin    dispo - await pt re-eval may need rehab but pt prefers home with home PT on 12/5

## 2022-12-04 NOTE — PROGRESS NOTE ADULT - SUBJECTIVE AND OBJECTIVE BOX
73 y/o F w/ PMH of a-fib (on coumadin), DM2, HFpEF, HTN, PVD, dyslipidemia, obesity, p/w SOB. Patient states that SOB started 3 days ago. SOB is worse w/ exertion, and improves with rest. Also c/o worsening SOB w/ lying down. States that she in the hospital wound care center and was referred to ED due to her SOB. +B/L LE edema. Denies cough, runny nose, sore throat, fever, chills, nausea, vomiting, abdominal pain, diarrhea.       11/30- patient was seen and examined. Denies SOB-feels tired. VSS  12/1- patient was seen and examined. VSS- no acute overnight events.    12/2- as per patient report she had chills this AM and low grad fever. "feels very tired"- denies SOB.   12/3 - pt with chills this AM. no fever   12/4 - no chills today.  pt weak when ambulating with nurse.   at bedside and both feel she is not ready for home today and awaiting PT re-eval.  no cp, sob    ROS:   All 10 systems reviewed and found to be negative with the exception of what has been described above.    Vital Signs Last 24 Hrs  T(C): 37.4 (04 Dec 2022 08:36), Max: 37.4 (04 Dec 2022 08:36)  T(F): 99.3 (04 Dec 2022 08:36), Max: 99.3 (04 Dec 2022 08:36)  HR: 98 (04 Dec 2022 08:36) (81 - 102)  BP: 119/78 (04 Dec 2022 08:36) (118/60 - 127/72)  BP(mean): 89 (04 Dec 2022 08:36) (89 - 89)  RR: 19 (04 Dec 2022 08:36) (18 - 19)  SpO2: 96% (04 Dec 2022 08:36) (95% - 99%)    Parameters below as of 04 Dec 2022 08:36  Patient On (Oxygen Delivery Method): nasal cannula  O2 Flow (L/min): 2      PE:  Constitutional: NAD, laying in bed  HEENT: NC/AT  Back: no tenderness  Respiratory: respirations even and non labored, diminished breath sounds   Cardiovascular: S1S2 regular, no murmurs  Abdomen: soft, not tender, not distended, positive BS  Genitourinary: voiding  Musculoskeletal: no muscle tenderness, no joint swelling or tenderness  Extremities: LE edema improved  Neurological: no focal deficits                              11.7   9.02  )-----------( 331      ( 04 Dec 2022 06:58 )             37.2     12-03    138  |  107  |  21  ----------------------------<  97  3.8   |  25  |  0.56    Ca    8.6      03 Dec 2022 07:14            PT/INR - ( 04 Dec 2022 06:58 )   PT: 21.8 sec;   INR: 1.87 ratio         PTT - ( 03 Dec 2022 07:14 )  PTT:31.6 sec     CT Angio Chest PE Protocol w/ IV Cont (11.29.22 @ 21:04) >   CT ANGIO CHEST PULM [facility]                        PROCEDURE DATE:  11/29/2022    INTERPRETATION:  CLINICAL INFORMATION: Chest pain  COMPARISON: CTA chest 6/9/2019  CONTRAST/COMPLICATIONS:  IV Contrast: Omnipaque 350  90 cc administered   10 cc discarded  Oral Contrast: NONE  Complications: None reported at time of study completion  PROCEDURE:  CT Angiography of the Chest.  Sagittal and coronal reformats were performed as well as 3D (MIP)   reconstructions.  FINDINGS:  LUNGS AND AIRWAYS: Patent central airways.  Patchy bilateral groundglass   opacities.  PLEURA: No pleural effusion.  MEDIASTINUM AND RACHEL: No lymphadenopathy.  VESSELS: No pulmonary embolus. Atherosclerotic ossifications of the aorta   and coronary arteries.  HEART: Cardiomegaly. No pericardial effusion.  CHEST WALL AND LOWER NECK: Multinodular thyroid again noted.  VISUALIZED UPPER ABDOMEN: Small hiatal hernia..  BONES: Degenerative changes.  IMPRESSION:  No pulmonary embolus.  Cardiomegaly and patchy groundglass opacities are nonspecific. Correlate   for pulmonary edema versus infection.  < from: TTE Echo Complete w/o Contrast w/ Doppler (11.30.22 @ 20:46) >  Endocardium is not well visualized, however, overall left ventricular   systolic function appears normal. Estimated left ventricular ejection   fraction is 55%.   Biatrial enlargement.   Moderate to severe aortic stenosis.   Mild tricuspid regurgitation.   Moderate pulmonary hypertension.    < end of copied text >    < from: TTE Echo Complete w/Doppler (05.14.19 @ 18:07) >  Summary:   1. Left ventricular ejection fraction, by visual estimation, is 50 to   55%.   2. Low normal global left ventricular systolic function.   3. The left ventricular diastolic function could not be assessed in this   study.   4. Moderate to severe left atrial enlargement.   5. Mildly enlarged right atrium.   6. Moderate mitral annular calcification.   7. Thickening and calcification of the anterior and posterior mitral   valve leaflets.   8. Mild mitral valve regurgitation.   9. Peak transaortic gradient equals 30.4 mmHg, mean transaortic gradient   equals 15.8 mmHg, the calculated aortic valve area equals 1.35 cm² by the   continuity equation consistent with moderate aortic stenosis.  10. Estimated pulmonary artery systolic pressure is 36.6 mmHg assuming a   right atrial pressure of 3 mmHg, which is consistent with borderline   pulmonary hypertension.  11. There is no evidence of pericardial effusion.  12. Endocardial visualization was enhanced with intravenous echo contrast.    MD Billy Electronically signed on 5/15/2019 at 8:56:19 AM            < end of copied text >

## 2022-12-05 LAB
-  AMIKACIN: SIGNIFICANT CHANGE UP
-  AMOXICILLIN/CLAVULANIC ACID: SIGNIFICANT CHANGE UP
-  AMPICILLIN/SULBACTAM: SIGNIFICANT CHANGE UP
-  AMPICILLIN: SIGNIFICANT CHANGE UP
-  AZTREONAM: SIGNIFICANT CHANGE UP
-  CEFAZOLIN: SIGNIFICANT CHANGE UP
-  CEFEPIME: SIGNIFICANT CHANGE UP
-  CEFTRIAXONE: SIGNIFICANT CHANGE UP
-  CIPROFLOXACIN: SIGNIFICANT CHANGE UP
-  ERTAPENEM: SIGNIFICANT CHANGE UP
-  GENTAMICIN: SIGNIFICANT CHANGE UP
-  IMIPENEM: SIGNIFICANT CHANGE UP
-  LEVOFLOXACIN: SIGNIFICANT CHANGE UP
-  MEROPENEM: SIGNIFICANT CHANGE UP
-  NITROFURANTOIN: SIGNIFICANT CHANGE UP
-  PIPERACILLIN/TAZOBACTAM: SIGNIFICANT CHANGE UP
-  TOBRAMYCIN: SIGNIFICANT CHANGE UP
-  TRIMETHOPRIM/SULFAMETHOXAZOLE: SIGNIFICANT CHANGE UP
ANION GAP SERPL CALC-SCNC: 4 MMOL/L — LOW (ref 5–17)
BUN SERPL-MCNC: 17 MG/DL — SIGNIFICANT CHANGE UP (ref 7–23)
CALCIUM SERPL-MCNC: 8.8 MG/DL — SIGNIFICANT CHANGE UP (ref 8.5–10.1)
CHLORIDE SERPL-SCNC: 107 MMOL/L — SIGNIFICANT CHANGE UP (ref 96–108)
CO2 SERPL-SCNC: 27 MMOL/L — SIGNIFICANT CHANGE UP (ref 22–31)
CREAT SERPL-MCNC: 0.49 MG/DL — LOW (ref 0.5–1.3)
CULTURE RESULTS: SIGNIFICANT CHANGE UP
EGFR: 99 ML/MIN/1.73M2 — SIGNIFICANT CHANGE UP
GLUCOSE SERPL-MCNC: 99 MG/DL — SIGNIFICANT CHANGE UP (ref 70–99)
INR BLD: 2.14 RATIO — HIGH (ref 0.88–1.16)
METHOD TYPE: SIGNIFICANT CHANGE UP
ORGANISM # SPEC MICROSCOPIC CNT: SIGNIFICANT CHANGE UP
POTASSIUM SERPL-MCNC: 3.9 MMOL/L — SIGNIFICANT CHANGE UP (ref 3.5–5.3)
POTASSIUM SERPL-SCNC: 3.9 MMOL/L — SIGNIFICANT CHANGE UP (ref 3.5–5.3)
PROTHROM AB SERPL-ACNC: 25 SEC — HIGH (ref 10.5–13.4)
SODIUM SERPL-SCNC: 138 MMOL/L — SIGNIFICANT CHANGE UP (ref 135–145)
SPECIMEN SOURCE: SIGNIFICANT CHANGE UP

## 2022-12-05 PROCEDURE — 99233 SBSQ HOSP IP/OBS HIGH 50: CPT

## 2022-12-05 RX ORDER — METOPROLOL TARTRATE 50 MG
1 TABLET ORAL
Qty: 0 | Refills: 0 | DISCHARGE
Start: 2022-12-05

## 2022-12-05 RX ORDER — WARFARIN SODIUM 2.5 MG/1
1 TABLET ORAL
Qty: 0 | Refills: 0 | DISCHARGE

## 2022-12-05 RX ORDER — VERAPAMIL HCL 240 MG
1 CAPSULE, EXTENDED RELEASE PELLETS 24 HR ORAL
Qty: 0 | Refills: 0 | DISCHARGE
Start: 2022-12-05

## 2022-12-05 RX ORDER — IPRATROPIUM/ALBUTEROL SULFATE 18-103MCG
3 AEROSOL WITH ADAPTER (GRAM) INHALATION EVERY 6 HOURS
Refills: 0 | Status: DISCONTINUED | OUTPATIENT
Start: 2022-12-05 | End: 2022-12-06

## 2022-12-05 RX ORDER — GABAPENTIN 400 MG/1
2 CAPSULE ORAL
Qty: 0 | Refills: 0 | DISCHARGE
Start: 2022-12-05

## 2022-12-05 RX ORDER — FUROSEMIDE 40 MG
1 TABLET ORAL
Qty: 30 | Refills: 0
Start: 2022-12-05 | End: 2023-01-03

## 2022-12-05 RX ORDER — METOPROLOL TARTRATE 50 MG
1 TABLET ORAL
Qty: 0 | Refills: 0 | DISCHARGE

## 2022-12-05 RX ORDER — LISINOPRIL 2.5 MG/1
5 TABLET ORAL DAILY
Refills: 0 | Status: DISCONTINUED | OUTPATIENT
Start: 2022-12-05 | End: 2022-12-05

## 2022-12-05 RX ORDER — ALBUTEROL 90 UG/1
2 AEROSOL, METERED ORAL
Qty: 1 | Refills: 0
Start: 2022-12-05 | End: 2023-01-03

## 2022-12-05 RX ORDER — FUROSEMIDE 40 MG
40 TABLET ORAL ONCE
Refills: 0 | Status: COMPLETED | OUTPATIENT
Start: 2022-12-05 | End: 2022-12-05

## 2022-12-05 RX ORDER — GABAPENTIN 400 MG/1
1 CAPSULE ORAL
Qty: 0 | Refills: 0 | DISCHARGE

## 2022-12-05 RX ORDER — POTASSIUM CHLORIDE 20 MEQ
1 PACKET (EA) ORAL
Qty: 30 | Refills: 0
Start: 2022-12-05 | End: 2023-01-03

## 2022-12-05 RX ORDER — VERAPAMIL HCL 240 MG
1 CAPSULE, EXTENDED RELEASE PELLETS 24 HR ORAL
Qty: 0 | Refills: 0 | DISCHARGE

## 2022-12-05 RX ORDER — ACETAMINOPHEN 500 MG
325 TABLET ORAL ONCE
Refills: 0 | Status: DISCONTINUED | OUTPATIENT
Start: 2022-12-05 | End: 2022-12-09

## 2022-12-05 RX ADMIN — Medication 3 MILLILITER(S): at 21:00

## 2022-12-05 RX ADMIN — Medication 1 APPLICATION(S): at 12:07

## 2022-12-05 RX ADMIN — Medication 40 MILLIEQUIVALENT(S): at 12:05

## 2022-12-05 RX ADMIN — ZOLPIDEM TARTRATE 5 MILLIGRAM(S): 10 TABLET ORAL at 23:26

## 2022-12-05 RX ADMIN — GABAPENTIN 800 MILLIGRAM(S): 400 CAPSULE ORAL at 06:53

## 2022-12-05 RX ADMIN — Medication 650 MILLIGRAM(S): at 04:12

## 2022-12-05 RX ADMIN — Medication 650 MILLIGRAM(S): at 05:12

## 2022-12-05 RX ADMIN — Medication 10 MILLIGRAM(S): at 10:41

## 2022-12-05 RX ADMIN — MONTELUKAST 10 MILLIGRAM(S): 4 TABLET, CHEWABLE ORAL at 22:19

## 2022-12-05 RX ADMIN — GABAPENTIN 800 MILLIGRAM(S): 400 CAPSULE ORAL at 14:27

## 2022-12-05 RX ADMIN — ATORVASTATIN CALCIUM 10 MILLIGRAM(S): 80 TABLET, FILM COATED ORAL at 22:18

## 2022-12-05 RX ADMIN — GABAPENTIN 800 MILLIGRAM(S): 400 CAPSULE ORAL at 22:16

## 2022-12-05 RX ADMIN — Medication 120 MILLIGRAM(S): at 10:40

## 2022-12-05 RX ADMIN — WARFARIN SODIUM 6 MILLIGRAM(S): 2.5 TABLET ORAL at 22:17

## 2022-12-05 RX ADMIN — Medication 40 MILLIGRAM(S): at 10:41

## 2022-12-05 RX ADMIN — Medication 50 MILLIGRAM(S): at 10:42

## 2022-12-05 RX ADMIN — Medication 120 MILLIGRAM(S): at 22:18

## 2022-12-05 RX ADMIN — CHOLESTYRAMINE 4 GRAM(S): 4 POWDER, FOR SUSPENSION ORAL at 10:39

## 2022-12-05 RX ADMIN — Medication 1: at 10:57

## 2022-12-05 RX ADMIN — Medication 1: at 16:44

## 2022-12-05 RX ADMIN — ZOLPIDEM TARTRATE 5 MILLIGRAM(S): 10 TABLET ORAL at 22:18

## 2022-12-05 RX ADMIN — Medication 40 MILLIGRAM(S): at 16:45

## 2022-12-05 NOTE — PROGRESS NOTE ADULT - SUBJECTIVE AND OBJECTIVE BOX
CHIEF COMPLAINT: Patient is a 72y old  Female who presents with a chief complaint of SOB (30 Nov 2022 18:55)      HPI: HPI:  73 y/o F w/ PMH of a-fib (on coumadin), DM2, HFpEF, HTN, PVD, dyslipidemia, obesity, moderate aortic stenosis, p/w SOB. Patient states that SOB started 3 days ago. SOB is worse w/ exertion, and improves with rest. Also c/o worsening SOB w/ lying down. States that she in the hospital wound care center and was referred to ED due to her SOB. +B/L LE edema. Denies cough, runny nose, sore throat, fever, chills, nausea, vomiting, abdominal pain, diarrhea.       PSH: L knee surgery, medial meniscus repair     Social Hx: Tobacco - quit many years ago, etoh / drugs - denies     Family Hx: Mother - breast cancer / DM    (30 Nov 2022 05:46)      PMHx: PAST MEDICAL & SURGICAL HISTORY:  Diabetes mellitus type II, controlled      Atrial fibrillation      Congestive heart failure      Dyspnea      Morbid obesity with BMI of 40.0-44.9, adult      Neuropathy      Peripheral venous insufficiency      Thyroid nodule      HTN (hypertension)      Cellulitis      History of esophagogastroduodenoscopy (EGD)      H/O colonoscopy      Other complications of gastric band procedure      S/P left knee arthroscopy      Status post medial meniscus repair            Soc Hx:     FAMILY HISTORY:  Family history of breast cancer in mother    Family history of diabetes mellitus in mother        Allergies: Allergies    [This allergen will not trigger allergy alert] IV DYE, IODINE CONTRAST (Unknown)  latex (Unknown)  penicillin (Hives)  pregabalin (Unknown)    Intolerances    12/2/22:  Patient was started on antibiotics for an apparent UTI        REVIEW OF SYSTEMS:  No cardiac symptoms    ICU Vital Signs Last 24 Hrs  T(C): 36.9 (04 Dec 2022 20:40), Max: 37.4 (04 Dec 2022 08:36)  T(F): 98.4 (04 Dec 2022 20:40), Max: 99.3 (04 Dec 2022 08:36)  HR: 87 (04 Dec 2022 20:40) (76 - 98)  BP: 134/72 (04 Dec 2022 20:40) (119/78 - 134/72)  BP(mean): 89 (04 Dec 2022 08:36) (89 - 89)  ABP: --  ABP(mean): --  RR: 16 (04 Dec 2022 20:40) (16 - 19)  SpO2: 95% (04 Dec 2022 20:40) (94% - 96%)    O2 Parameters below as of 04 Dec 2022 20:40  Patient On (Oxygen Delivery Method): nasal cannula  O2 Flow (L/min): 2                I&O's Summary    30 Nov 2022 07:01  -  01 Dec 2022 07:00  --------------------------------------------------------  IN: 200 mL / OUT: 0 mL / NET: 200 mL        CAPILLARY BLOOD GLUCOSE      POCT Blood Glucose.: 97 mg/dL (01 Dec 2022 07:22)  POCT Blood Glucose.: 186 mg/dL (30 Nov 2022 22:20)  POCT Blood Glucose.: 181 mg/dL (30 Nov 2022 16:29)  POCT Blood Glucose.: 128 mg/dL (30 Nov 2022 11:57)  POCT Blood Glucose.: 96 mg/dL (30 Nov 2022 07:37)      PHYSICAL EXAM:   Patient in NAD  Neck: No JVD; Carotids:  2+ without bruits  Respiratory:  Clear to A&P  Cardiovascular: S1 and S2,syst m  Gastrointestinal:  Soft, non-tender; BS positive  Extremities: No peripheral edema  Vascular: 2+ peripheral pulses  Neurological: A/O x 3, no focal deficits      MEDICATIONS  (STANDING):  atorvastatin 10 milliGRAM(s) Oral at bedtime  cholestyramine Powder (Sugar-Free) 4 Gram(s) Oral two times a day  dextrose 5%. 1000 milliLiter(s) (50 mL/Hr) IV Continuous <Continuous>  dextrose 5%. 1000 milliLiter(s) (100 mL/Hr) IV Continuous <Continuous>  dextrose 50% Injectable 25 Gram(s) IV Push once  dextrose 50% Injectable 12.5 Gram(s) IV Push once  dextrose 50% Injectable 25 Gram(s) IV Push once  furosemide    Tablet 40 milliGRAM(s) Oral daily  gabapentin 800 milliGRAM(s) Oral three times a day  glucagon  Injectable 1 milliGRAM(s) IntraMuscular once  influenza  Vaccine (HIGH DOSE) 0.7 milliLiter(s) IntraMuscular once  insulin lispro (ADMELOG) corrective regimen sliding scale   SubCutaneous three times a day before meals  insulin lispro (ADMELOG) corrective regimen sliding scale   SubCutaneous at bedtime  metoprolol succinate ER 50 milliGRAM(s) Oral daily  montelukast 10 milliGRAM(s) Oral at bedtime  potassium chloride    Tablet ER 40 milliEquivalent(s) Oral daily  predniSONE   Tablet 10 milliGRAM(s) Oral daily  silver sulfADIAZINE 1% Cream 1 Application(s) Topical daily  verapamil 120 milliGRAM(s) Oral two times a day  warfarin 6 milliGRAM(s) Oral at bedtime        Home Medications:  cholestyramine 4 g/5 g oral powder for reconstitution: 1 packet(s) orally 2 times a day (30 Nov 2022 05:51)  ezetimibe 10 mg oral tablet: 1 tab(s) orally once a day (in the evening) (30 Nov 2022 05:51)  gabapentin 800 mg oral tablet: 1 tab(s) orally 3 times a day (30 Nov 2022 05:51)  metFORMIN 1000 mg oral tablet: 1 tab(s) orally 2 times a day (30 Nov 2022 05:51)  metoprolol succinate 50 mg oral tablet, extended release: 1 tab(s) orally once a day (30 Nov 2022 05:51)  montelukast 10 mg oral tablet: 1 tab(s) orally once a day (at bedtime) (30 Nov 2022 05:51)  pravastatin 10 mg oral tablet: 1 tab(s) orally once a day (in the evening) (30 Nov 2022 05:51)  verapamil 120 mg oral tablet: 1 tab(s) orally 2 times a day (30 Nov 2022 05:51)  warfarin 5 mg oral tablet: 1 tab(s) orally once a day (in the evening) when OK   &#x27;ed by Dr. Mayer. (30 Nov 2022 05:51)  zolpidem 10 mg oral tablet: 1 tab(s) orally once a day (at bedtime) (30 Nov 2022 05:51)        LABS: All Labs Reviewed:  Blood Culture:   BNP Serum Pro-Brain Natriuretic Peptide: 1699 pg/mL (11-29 @ 18:24)    CBC                         Hemoglobin: 12.2 g/dL (11-30 @ 06:46)  Hemoglobin: 12.3 g/dL (11-29 @ 18:24)              Hematocrit: 37.8 % (11-30 @ 06:46)  Hematocrit: 38.0 % (11-29 @ 18:24)              Mean Cell Volume: 94.0 fl (11-30 @ 06:46)  Mean Cell Volume: 95.0 fl (11-29 @ 18:24)              Platelet Count - Automated: 316 K/uL (11-30 @ 06:46)  Platelet Count - Automated: 318 K/uL (11-29 @ 18:24)                        11.7   9.02  )-----------( 331      ( 04 Dec 2022 06:58 )             37.2                               Cardiac markers   Troponin I, High Sensitivity (11.29.22 @ 18:24) Troponin I, High Sensitivity Result: 7.06                                       Chems        Sodium, Serum: 140 mmol/L (11-30 @ 06:46)  Sodium, Serum: 139 mmol/L (11-29 @ 18:24)          Potassium, Serum: 2.9 mmol/L (11-30 @ 06:46)  Potassium, Serum: 3.8 mmol/L (11-29 @ 18:24)          Blood Urea Nitrogen, Serum: 13 mg/dL (11-30 @ 06:46)  Blood Urea Nitrogen, Serum: 9 mg/dL (11-29 @ 18:24)          Creatinine 0.50  Creatinine 0.60          Magnesium, Serum: 1.3 mg/dL (11-30 @ 06:46)          Protein Total, Serum: 7.1 gm/dL (11-30 @ 06:46)  Protein Total, Serum: 7.2 gm/dL (11-29 @ 18:24)                  Calcium, Total Serum: 8.8 mg/dL (11-30 @ 06:46)  Calcium, Total Serum: 8.9 mg/dL (11-29 @ 18:24)          Phosphorus Level, Serum: 3.7 mg/dL (11-30 @ 06:46)          Bilirubin Total, Serum: 0.6 mg/dL (11-30 @ 06:46)  Bilirubin Total, Serum: 0.4 mg/dL (11-29 @ 18:24)          Alanine Aminotransferase (ALT/SGPT): 14 U/L (11-30 @ 06:46)  Alanine Aminotransferase (ALT/SGPT): 16 U/L (11-29 @ 18:24)          Aspartate Aminotransferase (AST/SGOT): 10 U/L (11-30 @ 06:46)  Aspartate Aminotransferase (AST/SGOT): 9 U/L (11-29 @ 18:24)        Basic Metabolic Panel in AM (12.03.22 @ 07:14)   Sodium, Serum: 138 mmol/L   Potassium, Serum: 3.8 mmol/L   Chloride, Serum: 107 mmol/L   Carbon Dioxide, Serum: 25 mmol/L   Anion Gap, Serum: 6 mmol/L   Blood Urea Nitrogen, Serum: 21 mg/dL   Creatinine, Serum: 0.56 mg/dL   Glucose, Serum: 97 mg/dL   Calcium, Total Serum: 8.6 mg/dL                  INR: 1.70 ratio (11-30 @ 06:46)  INR: 1.66 ratio (11-29 @ 18:24)             RADIOLOGY:  < from: Xray Chest 1 View- PORTABLE-Urgent (11.29.22 @ 17:36) >  IMPRESSION:  1. Clear lungs with no acute cardiopulmonary abnormalities.  2. No significant change compared to the prior exam as discussed above.    < end of copied text >  < from: CT Angio Chest PE Protocol w/ IV Cont (11.29.22 @ 21:04) >  IMPRESSION:  No pulmonary embolus.    Cardiomegaly and patchy groundglass opacities are nonspecific. Correlate   for pulmonary edema versus infection.      < end of copied text >      EKG: < from: 12 Lead ECG (11.29.22 @ 16:25) >  Diagnosis Line Atrial fibrillation  Left axis deviation  Anterior infarct (cited on or before 12-OCT-2022)  Abnormal ECG  When compared with ECG of 12-OCT-2022 23:27,  No significant change was found  Confirmed by Ayo Smith MD ([id number]) on 11/30/2022 11:20:33 AM    < end of copied text >      Telemetry: afib with a moderate VR    ECHO:  < from: TTE Echo Complete w/o Contrast w/ Doppler (11.30.22 @ 20:46) >   Summary     Endocardium is not well visualized, however, overall left ventricular   systolic function appears normal. Estimated left ventricular ejection   fraction is 55%.   Biatrial enlargement.   Moderate to severe aortic stenosis.   Mild tricuspid regurgitation.   Moderate pulmonary hypertension.     Signature     ----------------------------------------------------------------   Electronically signed by Onel Retana MD(Interpreting   physician) on 12/01/2022 04:14 AM    < end of copied text >

## 2022-12-05 NOTE — PROGRESS NOTE ADULT - ASSESSMENT
73 y/o F w/ PMH of a-fib (on coumadin), DM2, HFpEF, HTN, PVD, dyslipidemia, obesity, p/w SOB    *Acute on chronic diastolic CHF   -Lasix IV 40mg now on 40 mg po daily  -I/Os + Daily weights  -On BB  -Cardio consult appreciated   -Echo results noted  -CT chest - cardiomegaly and patchy groundglass opacities - pulmonary edema vs infection? (however no signs / symptoms of infection at this time, will monitor off antibiotics for now)    -COVID negative       *UTI  - febrile  - +UA  - urine culture proteus mirabilis - sens ceft  - rocephin d#4 will not need abx upon dc     *B/L LE wounds  -Vascular consult appreciated     *Multinodular thyroid  -F/u outpatient     *DM2  -Humalog ISS   -Diabetic diet     *Permanent A-fib / HTn / PVD / dyslipidemia / obesity  -C/w home meds and f/u outpatient for further management if conditions remain stable during hospitalization    - coumadin   *Advance Directives  -States  is HCP. Denies limitations on resuscitation status     *DVT ppx   -On coumadin    dispo - PT reeval and check for home O2 requirement.

## 2022-12-05 NOTE — PROGRESS NOTE ADULT - SUBJECTIVE AND OBJECTIVE BOX
71 y/o F w/ PMH of a-fib (on coumadin), DM2, HFpEF, HTN, PVD, dyslipidemia, obesity, p/w SOB. Patient states that SOB started 3 days ago. SOB is worse w/ exertion, and improves with rest. Also c/o worsening SOB w/ lying down. States that she in the hospital wound care center and was referred to ED due to her SOB. +B/L LE edema. Denies cough, runny nose, sore throat, fever, chills, nausea, vomiting, abdominal pain, diarrhea.         12/4 - no chills today.  pt weak when ambulating with nurse.   at bedside and both feel she is not ready for home today and awaiting PT re-eval.  no cp, sob  12/5 - no acute overnight events. VSS.     ROS:   All 10 systems reviewed and found to be negative with the exception of what has been described above.    Vital Signs Last 24 Hrs  T(C): 36.7 (05 Dec 2022 08:23), Max: 36.9 (04 Dec 2022 16:09)  T(F): 98.1 (05 Dec 2022 08:23), Max: 98.4 (04 Dec 2022 16:09)  HR: 76 (05 Dec 2022 10:17) (72 - 90)  BP: 122/71 (05 Dec 2022 10:17) (120/67 - 162/86)  BP(mean): --  RR: 18 (05 Dec 2022 08:23) (16 - 18)  SpO2: 100% (05 Dec 2022 08:23) (94% - 100%)    Parameters below as of 05 Dec 2022 08:23  Patient On (Oxygen Delivery Method): nasal cannula  O2 Flow (L/min): 2        PE:  Constitutional: NAD, laying in bed  HEENT: NC/AT  Back: no tenderness  Respiratory: respirations even and non labored, diminished breath sounds   Cardiovascular: S1S2 regular, no murmurs  Abdomen: soft, not tender, not distended, positive BS  Genitourinary: voiding  Musculoskeletal: no muscle tenderness, no joint swelling or tenderness  Extremities: LE edema improved  Neurological: no focal deficits      12-05    138  |  107  |  17  ----------------------------<  99  3.9   |  27  |  0.49<L>    Ca    8.8      05 Dec 2022 07:32                            11.7   9.02  )-----------( 331      ( 04 Dec 2022 06:58 )                       37.2     12-03    138  |  107  |  21  ----------------------------<  97  3.8   |  25  |  0.56    Ca    8.6      03 Dec 2022 07:14            PT/INR - ( 04 Dec 2022 06:58 )   PT: 21.8 sec;   INR: 1.87 ratio         PTT - ( 03 Dec 2022 07:14 )  PTT:31.6 sec     CT Angio Chest PE Protocol w/ IV Cont (11.29.22 @ 21:04) >   CT ANGIO CHEST PULM [facility]                        PROCEDURE DATE:  11/29/2022    INTERPRETATION:  CLINICAL INFORMATION: Chest pain  COMPARISON: CTA chest 6/9/2019  CONTRAST/COMPLICATIONS:  IV Contrast: Omnipaque 350  90 cc administered   10 cc discarded  Oral Contrast: NONE  Complications: None reported at time of study completion  PROCEDURE:  CT Angiography of the Chest.  Sagittal and coronal reformats were performed as well as 3D (MIP)   reconstructions.  FINDINGS:  LUNGS AND AIRWAYS: Patent central airways.  Patchy bilateral groundglass   opacities.  PLEURA: No pleural effusion.  MEDIASTINUM AND RACHEL: No lymphadenopathy.  VESSELS: No pulmonary embolus. Atherosclerotic ossifications of the aorta   and coronary arteries.  HEART: Cardiomegaly. No pericardial effusion.  CHEST WALL AND LOWER NECK: Multinodular thyroid again noted.  VISUALIZED UPPER ABDOMEN: Small hiatal hernia..  BONES: Degenerative changes.  IMPRESSION:  No pulmonary embolus.  Cardiomegaly and patchy groundglass opacities are nonspecific. Correlate   for pulmonary edema versus infection.  < from: TTE Echo Complete w/o Contrast w/ Doppler (11.30.22 @ 20:46) >  Endocardium is not well visualized, however, overall left ventricular   systolic function appears normal. Estimated left ventricular ejection   fraction is 55%.   Biatrial enlargement.   Moderate to severe aortic stenosis.   Mild tricuspid regurgitation.   Moderate pulmonary hypertension.      < from: TTE Echo Complete w/Doppler (05.14.19 @ 18:07) >  Summary:   1. Left ventricular ejection fraction, by visual estimation, is 50 to   55%.   2. Low normal global left ventricular systolic function.   3. The left ventricular diastolic function could not be assessed in this   study.   4. Moderate to severe left atrial enlargement.   5. Mildly enlarged right atrium.   6. Moderate mitral annular calcification.   7. Thickening and calcification of the anterior and posterior mitral   valve leaflets.   8. Mild mitral valve regurgitation.   9. Peak transaortic gradient equals 30.4 mmHg, mean transaortic gradient   equals 15.8 mmHg, the calculated aortic valve area equals 1.35 cm² by the   continuity equation consistent with moderate aortic stenosis.  10. Estimated pulmonary artery systolic pressure is 36.6 mmHg assuming a   right atrial pressure of 3 mmHg, which is consistent with borderline   pulmonary hypertension.  11. There is no evidence of pericardial effusion.  12. Endocardial visualization was enhanced with intravenous echo contrast.

## 2022-12-05 NOTE — CHART NOTE - NSCHARTNOTEFT_GEN_A_CORE
HOME O2 EVALUATION    Pulse Ox Room Air Rest: 93%    Pulse Ox Room Air Ambulatin% walking in [place] with PT and a walker    Pulse Ox on O2          L Ambulating:    Pulse Ox Post Ambulation:

## 2022-12-05 NOTE — PROGRESS NOTE ADULT - ASSESSMENT
72 year old woman with the above PMH including normal LV systolic function and aortic stenosis, admitted with acute on chronic HFpEF.  I agree with furosemide therapy and will increase the dose to 40. I personally reviewed the TTE and I believe the aortic stenosis is more in the mild to moderate range.  ACEI therapy not indicated especially with the aortic stenosis.  Will discontinue this.  Will start an SGLT2 inhibitor prior to discharge.    12/2/22:  Cardiac status stable but patient now has an apparent UTI.  On Ceftriaxone.  Will change furosemide to PO    12/5/23:  Patient appears stable from a cardiac standpoint.  Continue present management.  To get repeat PT evaluation.  Patient does want EMANI/prefers home PT

## 2022-12-06 LAB
ANION GAP SERPL CALC-SCNC: 5 MMOL/L — SIGNIFICANT CHANGE UP (ref 5–17)
BUN SERPL-MCNC: 19 MG/DL — SIGNIFICANT CHANGE UP (ref 7–23)
CALCIUM SERPL-MCNC: 9.2 MG/DL — SIGNIFICANT CHANGE UP (ref 8.5–10.1)
CHLORIDE SERPL-SCNC: 104 MMOL/L — SIGNIFICANT CHANGE UP (ref 96–108)
CO2 SERPL-SCNC: 30 MMOL/L — SIGNIFICANT CHANGE UP (ref 22–31)
CREAT SERPL-MCNC: 0.59 MG/DL — SIGNIFICANT CHANGE UP (ref 0.5–1.3)
EGFR: 95 ML/MIN/1.73M2 — SIGNIFICANT CHANGE UP
GLUCOSE SERPL-MCNC: 112 MG/DL — HIGH (ref 70–99)
HCT VFR BLD CALC: 39.1 % — SIGNIFICANT CHANGE UP (ref 34.5–45)
HGB BLD-MCNC: 12.6 G/DL — SIGNIFICANT CHANGE UP (ref 11.5–15.5)
INR BLD: 2.29 RATIO — HIGH (ref 0.88–1.16)
MCHC RBC-ENTMCNC: 30.5 PG — SIGNIFICANT CHANGE UP (ref 27–34)
MCHC RBC-ENTMCNC: 32.2 GM/DL — SIGNIFICANT CHANGE UP (ref 32–36)
MCV RBC AUTO: 94.7 FL — SIGNIFICANT CHANGE UP (ref 80–100)
PLATELET # BLD AUTO: 364 K/UL — SIGNIFICANT CHANGE UP (ref 150–400)
POTASSIUM SERPL-MCNC: 3.5 MMOL/L — SIGNIFICANT CHANGE UP (ref 3.5–5.3)
POTASSIUM SERPL-SCNC: 3.5 MMOL/L — SIGNIFICANT CHANGE UP (ref 3.5–5.3)
PROTHROM AB SERPL-ACNC: 26.8 SEC — HIGH (ref 10.5–13.4)
RBC # BLD: 4.13 M/UL — SIGNIFICANT CHANGE UP (ref 3.8–5.2)
RBC # FLD: 13 % — SIGNIFICANT CHANGE UP (ref 10.3–14.5)
SODIUM SERPL-SCNC: 139 MMOL/L — SIGNIFICANT CHANGE UP (ref 135–145)
WBC # BLD: 7.32 K/UL — SIGNIFICANT CHANGE UP (ref 3.8–10.5)
WBC # FLD AUTO: 7.32 K/UL — SIGNIFICANT CHANGE UP (ref 3.8–10.5)

## 2022-12-06 PROCEDURE — 99233 SBSQ HOSP IP/OBS HIGH 50: CPT

## 2022-12-06 PROCEDURE — 93010 ELECTROCARDIOGRAM REPORT: CPT

## 2022-12-06 RX ORDER — MEROPENEM 1 G/30ML
INJECTION INTRAVENOUS
Refills: 0 | Status: COMPLETED | OUTPATIENT
Start: 2022-12-06 | End: 2022-12-09

## 2022-12-06 RX ORDER — FUROSEMIDE 40 MG
40 TABLET ORAL ONCE
Refills: 0 | Status: COMPLETED | OUTPATIENT
Start: 2022-12-06 | End: 2022-12-06

## 2022-12-06 RX ORDER — MEROPENEM 1 G/30ML
1000 INJECTION INTRAVENOUS ONCE
Refills: 0 | Status: COMPLETED | OUTPATIENT
Start: 2022-12-06 | End: 2022-12-06

## 2022-12-06 RX ORDER — MEROPENEM 1 G/30ML
1000 INJECTION INTRAVENOUS EVERY 8 HOURS
Refills: 0 | Status: COMPLETED | OUTPATIENT
Start: 2022-12-06 | End: 2022-12-09

## 2022-12-06 RX ADMIN — Medication 650 MILLIGRAM(S): at 21:47

## 2022-12-06 RX ADMIN — GABAPENTIN 800 MILLIGRAM(S): 400 CAPSULE ORAL at 21:14

## 2022-12-06 RX ADMIN — MEROPENEM 100 MILLIGRAM(S): 1 INJECTION INTRAVENOUS at 16:44

## 2022-12-06 RX ADMIN — MEROPENEM 100 MILLIGRAM(S): 1 INJECTION INTRAVENOUS at 21:16

## 2022-12-06 RX ADMIN — MEROPENEM 100 MILLIGRAM(S): 1 INJECTION INTRAVENOUS at 09:55

## 2022-12-06 RX ADMIN — Medication 3 MILLILITER(S): at 09:00

## 2022-12-06 RX ADMIN — ZOLPIDEM TARTRATE 5 MILLIGRAM(S): 10 TABLET ORAL at 21:15

## 2022-12-06 RX ADMIN — Medication 120 MILLIGRAM(S): at 09:57

## 2022-12-06 RX ADMIN — Medication 40 MILLIEQUIVALENT(S): at 09:55

## 2022-12-06 RX ADMIN — Medication 2: at 17:32

## 2022-12-06 RX ADMIN — Medication 650 MILLIGRAM(S): at 21:17

## 2022-12-06 RX ADMIN — Medication 650 MILLIGRAM(S): at 10:30

## 2022-12-06 RX ADMIN — GABAPENTIN 800 MILLIGRAM(S): 400 CAPSULE ORAL at 06:03

## 2022-12-06 RX ADMIN — Medication 650 MILLIGRAM(S): at 09:58

## 2022-12-06 RX ADMIN — Medication 120 MILLIGRAM(S): at 21:15

## 2022-12-06 RX ADMIN — WARFARIN SODIUM 6 MILLIGRAM(S): 2.5 TABLET ORAL at 21:14

## 2022-12-06 RX ADMIN — Medication 40 MILLIGRAM(S): at 08:04

## 2022-12-06 RX ADMIN — ZOLPIDEM TARTRATE 5 MILLIGRAM(S): 10 TABLET ORAL at 23:00

## 2022-12-06 RX ADMIN — CHOLESTYRAMINE 4 GRAM(S): 4 POWDER, FOR SUSPENSION ORAL at 09:56

## 2022-12-06 RX ADMIN — MONTELUKAST 10 MILLIGRAM(S): 4 TABLET, CHEWABLE ORAL at 21:14

## 2022-12-06 RX ADMIN — Medication 10 MILLIGRAM(S): at 09:56

## 2022-12-06 RX ADMIN — ATORVASTATIN CALCIUM 10 MILLIGRAM(S): 80 TABLET, FILM COATED ORAL at 21:14

## 2022-12-06 RX ADMIN — GABAPENTIN 800 MILLIGRAM(S): 400 CAPSULE ORAL at 14:04

## 2022-12-06 RX ADMIN — Medication 1 APPLICATION(S): at 14:05

## 2022-12-06 RX ADMIN — Medication 50 MILLIGRAM(S): at 09:56

## 2022-12-06 RX ADMIN — Medication 3 MILLILITER(S): at 02:32

## 2022-12-06 NOTE — CONSULT NOTE ADULT - SUBJECTIVE AND OBJECTIVE BOX
Patient is a 72y old  Female who presents with a chief complaint of SOB (06 Dec 2022 10:26)    HPI:  71 y/o F w/ PMH of a-fib (on coumadin), DM2, HFpEF, HTN, PVD, dyslipidemia, obesity, admitted on 11/29 for evaluation of shortness of breath, worse with exertion; was referred to the ED from the wound care center for which she has her legs being treated after antibiotics rx for cellulitis; of note urine culture is growing ESBL E coli and Proteus mirabilis.       PMH: as above  PSH: as above  Meds: per reconciliation sheet, noted below  MEDICATIONS  (STANDING):  atorvastatin 10 milliGRAM(s) Oral at bedtime  cholestyramine Powder (Sugar-Free) 4 Gram(s) Oral two times a day  dextrose 5%. 1000 milliLiter(s) (50 mL/Hr) IV Continuous <Continuous>  dextrose 5%. 1000 milliLiter(s) (100 mL/Hr) IV Continuous <Continuous>  dextrose 50% Injectable 25 Gram(s) IV Push once  dextrose 50% Injectable 12.5 Gram(s) IV Push once  dextrose 50% Injectable 25 Gram(s) IV Push once  furosemide    Tablet 40 milliGRAM(s) Oral daily  gabapentin 800 milliGRAM(s) Oral three times a day  glucagon  Injectable 1 milliGRAM(s) IntraMuscular once  influenza  Vaccine (HIGH DOSE) 0.7 milliLiter(s) IntraMuscular once  insulin lispro (ADMELOG) corrective regimen sliding scale   SubCutaneous three times a day before meals  insulin lispro (ADMELOG) corrective regimen sliding scale   SubCutaneous at bedtime  meropenem  IVPB 1000 milliGRAM(s) IV Intermittent every 8 hours  meropenem  IVPB      metoprolol succinate ER 50 milliGRAM(s) Oral daily  montelukast 10 milliGRAM(s) Oral at bedtime  potassium chloride    Tablet ER 40 milliEquivalent(s) Oral daily  predniSONE   Tablet 10 milliGRAM(s) Oral daily  silver sulfADIAZINE 1% Cream 1 Application(s) Topical daily  verapamil 120 milliGRAM(s) Oral two times a day  warfarin 6 milliGRAM(s) Oral at bedtime    MEDICATIONS  (PRN):  acetaminophen     Tablet .. 650 milliGRAM(s) Oral every 6 hours PRN Temp greater or equal to 38C (100.4F), Mild Pain (1 - 3)  acetaminophen     Tablet .. 325 milliGRAM(s) Oral once PRN Mild Pain (1 - 3)  albuterol    90 MICROgram(s) HFA Inhaler 2 Puff(s) Inhalation every 4 hours PRN Shortness of Breath  aluminum hydroxide/magnesium hydroxide/simethicone Suspension 30 milliLiter(s) Oral every 4 hours PRN Dyspepsia  dextrose Oral Gel 15 Gram(s) Oral once PRN Blood Glucose LESS THAN 70 milliGRAM(s)/deciliter  ondansetron Injectable 4 milliGRAM(s) IV Push every 6 hours PRN Nausea and/or Vomiting  zolpidem 5 milliGRAM(s) Oral at bedtime PRN Insomnia  zolpidem 5 milliGRAM(s) Oral at bedtime PRN Insomnia    Allergies    [This allergen will not trigger allergy alert] IV DYE, IODINE CONTRAST (Unknown)  latex (Unknown)  penicillin (Hives)  pregabalin (Unknown)    Intolerances      Social: no smoking, no alcohol, no illegal drugs; no recent travel, no exposure to TB  FAMILY HISTORY:  Family history of breast cancer in mother    Family history of diabetes mellitus in mother       no history of premature cardiovascular disease in first degree relatives  ROS: the patient denies fever, no chills, no HA, no dizziness, no sore throat, no blurry vision, no CP, no palpitations,  no cough, no abdominal pain, no diarrhea, no N/V, no dysuria, no leg pain, no claudication, no rash, no joint aches, no rectal pain or bleeding, no night sweats  All other systems reviewed and are negative    Vital Signs Last 24 Hrs  T(C): 36.8 (06 Dec 2022 09:51), Max: 37.6 (05 Dec 2022 19:08)  T(F): 98.2 (06 Dec 2022 09:51), Max: 99.7 (05 Dec 2022 19:08)  HR: 120 (06 Dec 2022 09:51) (80 - 120)  BP: 134/74 (06 Dec 2022 09:51) (125/62 - 138/88)  BP(mean): --  RR: 18 (06 Dec 2022 09:51) (18 - 18)  SpO2: 95% (06 Dec 2022 09:51) (92% - 96%)    Parameters below as of 06 Dec 2022 09:51  Patient On (Oxygen Delivery Method): room air      Daily     Daily     PE:    Constitutional: frail looking  HEENT: NC/AT, EOMI, PERRLA, conjunctivae clear; ears and nose atraumatic; pharynx clear  Neck: supple; thyroid not palpable  Back: no tenderness  Respiratory: respiratory effort normal; clear to auscultation  Cardiovascular: S1S2 regular, no murmurs  Abdomen: soft, not tender, not distended, positive BS; no liver or spleen organomegaly  Genitourinary: no suprapubic tenderness  Musculoskeletal: no muscle tenderness, no joint swelling or tenderness  Neurological/ Psychiatric: AxOx3, judgement and insight normal;  moving all extremities  Skin: no rashes; no palpable lesions    Labs: all available labs reviewed                        12.6   7.32  )-----------( 364      ( 06 Dec 2022 06:55 )             39.1     12-06    139  |  104  |  19  ----------------------------<  112<H>  3.5   |  30  |  0.59    Ca    9.2      06 Dec 2022 06:55         Culture - Urine (12.01.22 @ 10:49)    -  Amikacin: S <=16    -  Amikacin: S <=16    -  Amoxicillin/Clavulanic Acid: S <=8/4    -  Amoxicillin/Clavulanic Acid: S <=8/4    -  Ampicillin: R >16 These ampicillin results predict results for amoxicillin    -  Ampicillin: R >16 These ampicillin results predict results for amoxicillin    -  Ampicillin/Sulbactam: I 16/8 Enterobacter, Klebsiella aerogenes, Citrobacter, and Serratia may develop resistance during prolonged therapy (3-4 days)    -  Ampicillin/Sulbactam: S <=4/2 Enterobacter, Klebsiella aerogenes, Citrobacter, and Serratia may develop resistance during prolonged therapy (3-4 days)    -  Cefazolin: R >16 For uncomplicated UTI with K. pneumoniae, E. coli, or P. mirablis: TOYA <=16 is sensitive and TOYA >=32 is resistant. This also predicts results for oral agents cefaclor, cefdinir, cefpodoxime, cefprozil, cefuroxime axetil, cephalexin and locarbef for uncomplicated UTI. Note that some isolates may be susceptible to these agents while testing resistant to cefazolin.    -  Cefazolin: S 4 For uncomplicated UTI with K. pneumoniae, E. coli, or P. mirablis: TOYA <=16 is sensitive and TOYA >=32 is resistant. This also predicts results for oral agents cefaclor, cefdinir, cefpodoxime, cefprozil, cefuroxime axetil, cephalexin and locarbef for uncomplicated UTI. Note that some isolates may be susceptible to these agents while testing resistant to cefazolin.    -  Aztreonam: S <=4    -  Aztreonam: R >16    -  Cefepime: S <=2    -  Cefepime: R >16    -  Cefoxitin: S <=8    -  Ceftriaxone: S <=1 Enterobacter, Klebsiella aerogenes, Citrobacter, and Serratia may develop resistance during prolonged therapy    -  Ceftriaxone: R >32 Enterobacter, Klebsiella aerogenes, Citrobacter, and Serratia may develop resistance during prolonged therapy    -  Ciprofloxacin: R >2    -  Ciprofloxacin: R >2    -  Ertapenem: S <=0.5    -  Ertapenem: S <=0.5    -  Gentamicin: S <=2    -  Gentamicin: S <=2    -  Imipenem: S <=1    -  Levofloxacin: R >4    -  Levofloxacin: R 4    -  Meropenem: S <=1    -  Meropenem: S <=1    -  Nitrofurantoin: S <=32 Should not be used to treat pyelonephritis    -  Nitrofurantoin: R >64 Should not be used to treat pyelonephritis    -  Piperacillin/Tazobactam: S <=8    -  Piperacillin/Tazobactam: R 32    -  Tobramycin: S <=2    -  Tobramycin: S <=2    -  Trimethoprim/Sulfamethoxazole: R >2/38    -  Trimethoprim/Sulfamethoxazole: S <=0.5/9.5    Specimen Source: Catheterized Catheterized    Culture Results:   >100,000 CFU/ml Proteus mirabilis  50,000 - 99,000 CFU/mL Escherichia coli ESBL    Organism Identification: Proteus mirabilis  Escherichia coli ESBL    Organism: Proteus mirabilis    Organism: Escherichia coli ESBL    Method Type: TOYA    Method Type: TOYA          Radiology: all available radiological tests reviewed    Advanced directives addressed: full resuscitation

## 2022-12-06 NOTE — PROGRESS NOTE ADULT - ASSESSMENT
73 y/o F w/ PMH of a-fib (on coumadin), DM2, HFpEF, HTN, PVD, dyslipidemia, obesity, p/w SOB    *Acute on chronic diastolic CHF   -c/w lasix- received extra dose this AM for SOB   -I/Os + Daily weights  -On BB  -Cardio consult appreciated   -Echo results noted  -CT chest - cardiomegaly and patchy groundglass opacities - pulmonary edema vs infection? (however no signs / symptoms of infection at this time, will monitor off antibiotics for now)    -COVID negative     *UTI  - febrile  - +UA  - urine culture proteus mirabilis - sens ceft  - completed 4 days of ceftriaxone  - 12/6- sensitivities back from Ecoli- ESBL  - meropenem started  - ID consulted.       *B/L LE wounds  -Vascular consult appreciated     *Multinodular thyroid  -F/u outpatient     *DM2  -Humalog ISS   -Diabetic diet     *Permanent A-fib / HTn / PVD / dyslipidemia / obesity  -C/w home meds and f/u outpatient for further management if conditions remain stable during hospitalization    - coumadin   *Advance Directives  -States  is HCP. Denies limitations on resuscitation status     *DVT ppx   -On coumadin    dispo - PT reeval and check for home O2 requirement.- does not need O2  Will need Meropenem IV for ESBL.

## 2022-12-06 NOTE — PROGRESS NOTE ADULT - SUBJECTIVE AND OBJECTIVE BOX
71 y/o F w/ PMH of a-fib (on coumadin), DM2, HFpEF, HTN, PVD, dyslipidemia, obesity, p/w SOB. Patient states that SOB started 3 days ago. SOB is worse w/ exertion, and improves with rest. Also c/o worsening SOB w/ lying down. States that she in the hospital wound care center and was referred to ED due to her SOB. +B/L LE edema. Denies cough, runny nose, sore throat, fever, chills, nausea, vomiting, abdominal pain, diarrhea.         12/4 - no chills today.  pt weak when ambulating with nurse.   at bedside and both feel she is not ready for home today and awaiting PT re-eval.  no cp, sob  12/5 - no acute overnight events. VSS.   12/6- +chills and feels SOB- improved from yesterday evening - when she was JETT and received extra dose of lasix. Denies fever. VSS    ROS:   All 10 systems reviewed and found to be negative with the exception of what has been described above.    Vital Signs Last 24 Hrs  T(C): 36.8 (06 Dec 2022 09:51), Max: 37.6 (05 Dec 2022 19:08)  T(F): 98.2 (06 Dec 2022 09:51), Max: 99.7 (05 Dec 2022 19:08)  HR: 120 (06 Dec 2022 09:51) (80 - 120)  BP: 134/74 (06 Dec 2022 09:51) (125/62 - 138/88)  BP(mean): --  RR: 18 (06 Dec 2022 09:51) (18 - 18)  SpO2: 95% (06 Dec 2022 09:51) (92% - 96%)    Parameters below as of 06 Dec 2022 09:51  Patient On (Oxygen Delivery Method): room air            PE:  Constitutional: NAD, laying in bed- JETT   HEENT: NC/AT  Back: no tenderness  Respiratory: respirations even and non labored, diminished breath sounds   Cardiovascular: S1S2 regular, no murmurs  Abdomen: soft, not tender, not distended, positive BS  Genitourinary: voiding  Musculoskeletal: no muscle tenderness, no joint swelling or tenderness  Extremities: LE edema improved  Neurological: no focal deficits        12-06    139  |  104  |  19  ----------------------------<  112<H>  3.5   |  30  |  0.59    Ca    9.2      06 Dec 2022 06:55                            12.6   7.32  )-----------( 364      ( 06 Dec 2022 06:55 )             39.1     12-05    138  |  107  |  17  ----------------------------<  99  3.9   |  27  |  0.49<L>    Ca    8.8      05 Dec 2022 07:32                            11.7   9.02  )-----------( 331      ( 04 Dec 2022 06:58 )                       37.2     12-03    138  |  107  |  21  ----------------------------<  97  3.8   |  25  |  0.56    Ca    8.6      03 Dec 2022 07:14            PT/INR - ( 04 Dec 2022 06:58 )   PT: 21.8 sec;   INR: 1.87 ratio         PTT - ( 03 Dec 2022 07:14 )  PTT:31.6 sec     CT Angio Chest PE Protocol w/ IV Cont (11.29.22 @ 21:04) >   CT ANGIO CHEST PULM [facility]                        PROCEDURE DATE:  11/29/2022    INTERPRETATION:  CLINICAL INFORMATION: Chest pain  COMPARISON: CTA chest 6/9/2019  CONTRAST/COMPLICATIONS:  IV Contrast: Omnipaque 350  90 cc administered   10 cc discarded  Oral Contrast: NONE  Complications: None reported at time of study completion  PROCEDURE:  CT Angiography of the Chest.  Sagittal and coronal reformats were performed as well as 3D (MIP)   reconstructions.  FINDINGS:  LUNGS AND AIRWAYS: Patent central airways.  Patchy bilateral groundglass   opacities.  PLEURA: No pleural effusion.  MEDIASTINUM AND RACHEL: No lymphadenopathy.  VESSELS: No pulmonary embolus. Atherosclerotic ossifications of the aorta   and coronary arteries.  HEART: Cardiomegaly. No pericardial effusion.  CHEST WALL AND LOWER NECK: Multinodular thyroid again noted.  VISUALIZED UPPER ABDOMEN: Small hiatal hernia..  BONES: Degenerative changes.  IMPRESSION:  No pulmonary embolus.  Cardiomegaly and patchy groundglass opacities are nonspecific. Correlate   for pulmonary edema versus infection.  < from: TTE Echo Complete w/o Contrast w/ Doppler (11.30.22 @ 20:46) >  Endocardium is not well visualized, however, overall left ventricular   systolic function appears normal. Estimated left ventricular ejection   fraction is 55%.   Biatrial enlargement.   Moderate to severe aortic stenosis.   Mild tricuspid regurgitation.   Moderate pulmonary hypertension.      < from: TTE Echo Complete w/Doppler (05.14.19 @ 18:07) >  Summary:   1. Left ventricular ejection fraction, by visual estimation, is 50 to   55%.   2. Low normal global left ventricular systolic function.   3. The left ventricular diastolic function could not be assessed in this   study.   4. Moderate to severe left atrial enlargement.   5. Mildly enlarged right atrium.   6. Moderate mitral annular calcification.   7. Thickening and calcification of the anterior and posterior mitral   valve leaflets.   8. Mild mitral valve regurgitation.   9. Peak transaortic gradient equals 30.4 mmHg, mean transaortic gradient   equals 15.8 mmHg, the calculated aortic valve area equals 1.35 cm² by the   continuity equation consistent with moderate aortic stenosis.  10. Estimated pulmonary artery systolic pressure is 36.6 mmHg assuming a   right atrial pressure of 3 mmHg, which is consistent with borderline   pulmonary hypertension.  11. There is no evidence of pericardial effusion.  12. Endocardial visualization was enhanced with intravenous echo contrast.

## 2022-12-06 NOTE — CONSULT NOTE ADULT - ASSESSMENT
71 y/o F w/ PMH of a-fib (on coumadin), DM2, HFpEF, HTN, PVD, dyslipidemia, obesity, admitted on 11/29 for evaluation of shortness of breath, worse with exertion; was referred to the ED from the wound care center for which she has her legs being treated after antibiotics rx for cellulitis; of note urine culture is growing ESBL E coli and Proteus mirabilis.     1. Patient admitted with dyspnea, found to have urinary tract infection with ESBL E coli plus Proteus  - iv hydration and supportive care   - serial cbc and monitor temperature   - reviewed prior medical records to evaluate for resistant or atypical pathogens   - will continue meropenem as ordered  - contact isolation  - wound care to legs  - elevate legs   2. other issues: per medicine

## 2022-12-06 NOTE — PROGRESS NOTE ADULT - SUBJECTIVE AND OBJECTIVE BOX
CHIEF COMPLAINT: Patient is a 72y old  Female who presents with a chief complaint of SOB (30 Nov 2022 18:55)      HPI: HPI:  71 y/o F w/ PMH of a-fib (on coumadin), DM2, HFpEF, HTN, PVD, dyslipidemia, obesity, moderate aortic stenosis, p/w SOB. Patient states that SOB started 3 days ago. SOB is worse w/ exertion, and improves with rest. Also c/o worsening SOB w/ lying down. States that she in the hospital wound care center and was referred to ED due to her SOB. +B/L LE edema. Denies cough, runny nose, sore throat, fever, chills, nausea, vomiting, abdominal pain, diarrhea.       PSH: L knee surgery, medial meniscus repair     Social Hx: Tobacco - quit many years ago, etoh / drugs - denies     Family Hx: Mother - breast cancer / DM    (30 Nov 2022 05:46)      PMHx: PAST MEDICAL & SURGICAL HISTORY:  Diabetes mellitus type II, controlled      Atrial fibrillation      Congestive heart failure      Dyspnea      Morbid obesity with BMI of 40.0-44.9, adult      Neuropathy      Peripheral venous insufficiency      Thyroid nodule      HTN (hypertension)      Cellulitis      History of esophagogastroduodenoscopy (EGD)      H/O colonoscopy      Other complications of gastric band procedure      S/P left knee arthroscopy      Status post medial meniscus repair            Soc Hx:     FAMILY HISTORY:  Family history of breast cancer in mother    Family history of diabetes mellitus in mother        Allergies: Allergies    [This allergen will not trigger allergy alert] IV DYE, IODINE CONTRAST (Unknown)  latex (Unknown)  penicillin (Hives)  pregabalin (Unknown)    Intolerances    12/2/22:  Patient was started on antibiotics for an apparent UTI  12/6/22:  Patient was short of breath yesterday while walking in the hallway        REVIEW OF SYSTEMS:  sob    ICU Vital Signs Last 24 Hrs  T(C): 37.6 (05 Dec 2022 19:08), Max: 37.6 (05 Dec 2022 19:08)  T(F): 99.7 (05 Dec 2022 19:08), Max: 99.7 (05 Dec 2022 19:08)  HR: 81 (06 Dec 2022 02:30) (72 - 98)  BP: 138/88 (05 Dec 2022 21:30) (120/67 - 162/86)  BP(mean): --  ABP: --  ABP(mean): --  RR: 18 (05 Dec 2022 19:08) (18 - 18)  SpO2: 96% (06 Dec 2022 02:30) (93% - 100%)    O2 Parameters below as of 06 Dec 2022 02:30  Patient On (Oxygen Delivery Method): nasal cannula          PHYSICAL EXAM:   Patient in NAD  Neck: No JVD; Carotids:  2+ without bruits  Respiratory:  Clear to A&P  Cardiovascular: S1 and S2,syst m  Gastrointestinal:  Soft, non-tender; BS positive  Extremities: No peripheral edema  Vascular: 2+ peripheral pulses  Neurological: A/O x 3, no focal deficits      MEDICATIONS  (STANDING):  albuterol/ipratropium for Nebulization 3 milliLiter(s) Nebulizer every 6 hours  atorvastatin 10 milliGRAM(s) Oral at bedtime  cholestyramine Powder (Sugar-Free) 4 Gram(s) Oral two times a day  dextrose 5%. 1000 milliLiter(s) (50 mL/Hr) IV Continuous <Continuous>  dextrose 5%. 1000 milliLiter(s) (100 mL/Hr) IV Continuous <Continuous>  dextrose 50% Injectable 25 Gram(s) IV Push once  dextrose 50% Injectable 12.5 Gram(s) IV Push once  dextrose 50% Injectable 25 Gram(s) IV Push once  furosemide    Tablet 40 milliGRAM(s) Oral daily  gabapentin 800 milliGRAM(s) Oral three times a day  glucagon  Injectable 1 milliGRAM(s) IntraMuscular once  influenza  Vaccine (HIGH DOSE) 0.7 milliLiter(s) IntraMuscular once  insulin lispro (ADMELOG) corrective regimen sliding scale   SubCutaneous three times a day before meals  insulin lispro (ADMELOG) corrective regimen sliding scale   SubCutaneous at bedtime  metoprolol succinate ER 50 milliGRAM(s) Oral daily  montelukast 10 milliGRAM(s) Oral at bedtime  potassium chloride    Tablet ER 40 milliEquivalent(s) Oral daily  predniSONE   Tablet 10 milliGRAM(s) Oral daily  silver sulfADIAZINE 1% Cream 1 Application(s) Topical daily  verapamil 120 milliGRAM(s) Oral two times a day  warfarin 6 milliGRAM(s) Oral at bedtime            Home Medications:  cholestyramine 4 g/5 g oral powder for reconstitution: 1 packet(s) orally 2 times a day (30 Nov 2022 05:51)  ezetimibe 10 mg oral tablet: 1 tab(s) orally once a day (in the evening) (30 Nov 2022 05:51)  gabapentin 800 mg oral tablet: 1 tab(s) orally 3 times a day (30 Nov 2022 05:51)  metFORMIN 1000 mg oral tablet: 1 tab(s) orally 2 times a day (30 Nov 2022 05:51)  metoprolol succinate 50 mg oral tablet, extended release: 1 tab(s) orally once a day (30 Nov 2022 05:51)  montelukast 10 mg oral tablet: 1 tab(s) orally once a day (at bedtime) (30 Nov 2022 05:51)  pravastatin 10 mg oral tablet: 1 tab(s) orally once a day (in the evening) (30 Nov 2022 05:51)  verapamil 120 mg oral tablet: 1 tab(s) orally 2 times a day (30 Nov 2022 05:51)  warfarin 5 mg oral tablet: 1 tab(s) orally once a day (in the evening) when OK   &#x27;ed by Dr. Mayer. (30 Nov 2022 05:51)  zolpidem 10 mg oral tablet: 1 tab(s) orally once a day (at bedtime) (30 Nov 2022 05:51)        LABS: All Labs Reviewed:  Blood Culture:   BNP Serum Pro-Brain Natriuretic Peptide: 1699 pg/mL (11-29 @ 18:24)    CBC                         Hemoglobin: 12.2 g/dL (11-30 @ 06:46)  Hemoglobin: 12.3 g/dL (11-29 @ 18:24)              Hematocrit: 37.8 % (11-30 @ 06:46)  Hematocrit: 38.0 % (11-29 @ 18:24)              Mean Cell Volume: 94.0 fl (11-30 @ 06:46)  Mean Cell Volume: 95.0 fl (11-29 @ 18:24)              Platelet Count - Automated: 316 K/uL (11-30 @ 06:46)  Platelet Count - Automated: 318 K/uL (11-29 @ 18:24)                        11.7   9.02  )-----------( 331      ( 04 Dec 2022 06:58 )             37.2                               Cardiac markers   Troponin I, High Sensitivity (11.29.22 @ 18:24) Troponin I, High Sensitivity Result: 7.06                                       Chems        Sodium, Serum: 140 mmol/L (11-30 @ 06:46)  Sodium, Serum: 139 mmol/L (11-29 @ 18:24)          Potassium, Serum: 2.9 mmol/L (11-30 @ 06:46)  Potassium, Serum: 3.8 mmol/L (11-29 @ 18:24)          Blood Urea Nitrogen, Serum: 13 mg/dL (11-30 @ 06:46)  Blood Urea Nitrogen, Serum: 9 mg/dL (11-29 @ 18:24)          Creatinine 0.50  Creatinine 0.60          Magnesium, Serum: 1.3 mg/dL (11-30 @ 06:46)          Protein Total, Serum: 7.1 gm/dL (11-30 @ 06:46)  Protein Total, Serum: 7.2 gm/dL (11-29 @ 18:24)                  Calcium, Total Serum: 8.8 mg/dL (11-30 @ 06:46)  Calcium, Total Serum: 8.9 mg/dL (11-29 @ 18:24)          Phosphorus Level, Serum: 3.7 mg/dL (11-30 @ 06:46)          Bilirubin Total, Serum: 0.6 mg/dL (11-30 @ 06:46)  Bilirubin Total, Serum: 0.4 mg/dL (11-29 @ 18:24)          Alanine Aminotransferase (ALT/SGPT): 14 U/L (11-30 @ 06:46)  Alanine Aminotransferase (ALT/SGPT): 16 U/L (11-29 @ 18:24)          Aspartate Aminotransferase (AST/SGOT): 10 U/L (11-30 @ 06:46)  Aspartate Aminotransferase (AST/SGOT): 9 U/L (11-29 @ 18:24)        Basic Metabolic Panel in AM (12.03.22 @ 07:14)   Sodium, Serum: 138 mmol/L   Potassium, Serum: 3.8 mmol/L   Chloride, Serum: 107 mmol/L   Carbon Dioxide, Serum: 25 mmol/L   Anion Gap, Serum: 6 mmol/L   Blood Urea Nitrogen, Serum: 21 mg/dL   Creatinine, Serum: 0.56 mg/dL   Glucose, Serum: 97 mg/dL   Calcium, Total Serum: 8.6 mg/dL                  INR: 1.70 ratio (11-30 @ 06:46)  INR: 1.66 ratio (11-29 @ 18:24)             RADIOLOGY:  < from: Xray Chest 1 View- PORTABLE-Urgent (11.29.22 @ 17:36) >  IMPRESSION:  1. Clear lungs with no acute cardiopulmonary abnormalities.  2. No significant change compared to the prior exam as discussed above.    < end of copied text >  < from: CT Angio Chest PE Protocol w/ IV Cont (11.29.22 @ 21:04) >  IMPRESSION:  No pulmonary embolus.    Cardiomegaly and patchy groundglass opacities are nonspecific. Correlate   for pulmonary edema versus infection.      < end of copied text >      EKG: < from: 12 Lead ECG (11.29.22 @ 16:25) >  Diagnosis Line Atrial fibrillation  Left axis deviation  Anterior infarct (cited on or before 12-OCT-2022)  Abnormal ECG  When compared with ECG of 12-OCT-2022 23:27,  No significant change was found  Confirmed by Ayo Smith MD ([id number]) on 11/30/2022 11:20:33 AM    < end of copied text >      Telemetry: afib with a moderate VR    ECHO:  < from: TTE Echo Complete w/o Contrast w/ Doppler (11.30.22 @ 20:46) >   Summary     Endocardium is not well visualized, however, overall left ventricular   systolic function appears normal. Estimated left ventricular ejection   fraction is 55%.   Biatrial enlargement.   Moderate to severe aortic stenosis.   Mild tricuspid regurgitation.   Moderate pulmonary hypertension.     Signature     ----------------------------------------------------------------   Electronically signed by Onel Retana MD(Interpreting   physician) on 12/01/2022 04:14 AM    < end of copied text >

## 2022-12-06 NOTE — PHYSICAL THERAPY INITIAL EVALUATION ADULT - REHAB POTENTIAL, PT EVAL
INTERVENTIONAL RADIOLOGY LOCAL ASSESSMENT NOTE:     Patient: Dominique Layton  Date: 2022  : 1950 Attending: Luli Torres MD  72 year old   female            PROCEDURE ASSESSMENT- LOCAL ANESTHESIA     Preop Diagnosis:    1. Lymphadenopathy        Indications for Procedure: Adenopathy     Planned Procedure: Biopsy    Planned Anesthetic:  local     MEDICAL HISTORY / COMORBID CONDITIONS:  Reviewed. See below.     Normal mental status:   yes     EXAMINATION PERTINENT TO PROCEDURE BEING PERFORMED  Evaluation of operative site: Examination of operative site completed, WNL.      OTHER FINDINGS  Reviewed current medications and allergies yes     PERTINENT LAB / DIAGNOSTIC TESTSPERTINENT LAB / DIAGNOSTIC TESTS  Reviewed. See below.     INFORMED CONSENT  Consent obtained: yes  Risks / benefits, complications, and alternatives explained, questions answered and patient / personal representative agrees to:  procedure listed above and anesthetic listed above     Skyler Grimaldo MD           INFORMATION REVIEWED  Past Medical History:   Diagnosis Date   • Anxiety    • COPD (chronic obstructive pulmonary disease) (CMS/HCC)    • Depression    • High cholesterol    • Sinusitis, chronic      There is no problem list on file for this patient.     Pertinent Labs  No results found for: PT No results found for: INR  WBC (K/mcL)   Date Value   2022 7.8     RBC (mil/mcL)   Date Value   2022 4.63     HCT (%)   Date Value   2022 45.1     HGB (g/dL)   Date Value   2022 14.6     PLT (K/mcL)   Date Value   2022 274                  good, to achieve stated therapy goals

## 2022-12-06 NOTE — PROGRESS NOTE ADULT - ASSESSMENT
72 year old woman with the above PMH including normal LV systolic function and aortic stenosis, admitted with acute on chronic HFpEF.  I agree with furosemide therapy and will increase the dose to 40. I personally reviewed the TTE and I believe the aortic stenosis is more in the mild to moderate range.  ACEI therapy not indicated especially with the aortic stenosis.  Will discontinue this.  Will start an SGLT2 inhibitor prior to discharge.    12/2/22:  Cardiac status stable but patient now has an apparent UTI.  On Ceftriaxone.  Will change furosemide to PO    12/5/23:  Patient appears stable from a cardiac standpoint.  Continue present management.  To get repeat PT evaluation.  Patient does want EMANI/prefers home PT    12/6/22:  Patient with JETT.  This is probably multifactorial including HFpEF, aortic stenosis and obesity/deconditioned state.  Will give one extra dose of IV furosemide and then re-evaluate.  As previously stated will start SGLT2 I therapy once she is stable.

## 2022-12-07 ENCOUNTER — APPOINTMENT (OUTPATIENT)
Dept: INTERNAL MEDICINE | Facility: CLINIC | Age: 72
End: 2022-12-07

## 2022-12-07 LAB
ANION GAP SERPL CALC-SCNC: 3 MMOL/L — LOW (ref 5–17)
APTT BLD: 37.9 SEC — HIGH (ref 27.5–35.5)
BUN SERPL-MCNC: 25 MG/DL — HIGH (ref 7–23)
CALCIUM SERPL-MCNC: 9 MG/DL — SIGNIFICANT CHANGE UP (ref 8.5–10.1)
CHLORIDE SERPL-SCNC: 107 MMOL/L — SIGNIFICANT CHANGE UP (ref 96–108)
CO2 SERPL-SCNC: 30 MMOL/L — SIGNIFICANT CHANGE UP (ref 22–31)
CREAT SERPL-MCNC: 0.6 MG/DL — SIGNIFICANT CHANGE UP (ref 0.5–1.3)
EGFR: 95 ML/MIN/1.73M2 — SIGNIFICANT CHANGE UP
GLUCOSE SERPL-MCNC: 105 MG/DL — HIGH (ref 70–99)
HCT VFR BLD CALC: 41.4 % — SIGNIFICANT CHANGE UP (ref 34.5–45)
HGB BLD-MCNC: 13.3 G/DL — SIGNIFICANT CHANGE UP (ref 11.5–15.5)
INR BLD: 2.32 RATIO — HIGH (ref 0.88–1.16)
MCHC RBC-ENTMCNC: 30.6 PG — SIGNIFICANT CHANGE UP (ref 27–34)
MCHC RBC-ENTMCNC: 32.1 GM/DL — SIGNIFICANT CHANGE UP (ref 32–36)
MCV RBC AUTO: 95.4 FL — SIGNIFICANT CHANGE UP (ref 80–100)
PLATELET # BLD AUTO: 401 K/UL — HIGH (ref 150–400)
POTASSIUM SERPL-MCNC: 3.7 MMOL/L — SIGNIFICANT CHANGE UP (ref 3.5–5.3)
POTASSIUM SERPL-SCNC: 3.7 MMOL/L — SIGNIFICANT CHANGE UP (ref 3.5–5.3)
PROTHROM AB SERPL-ACNC: 27.1 SEC — HIGH (ref 10.5–13.4)
RBC # BLD: 4.34 M/UL — SIGNIFICANT CHANGE UP (ref 3.8–5.2)
RBC # FLD: 13.1 % — SIGNIFICANT CHANGE UP (ref 10.3–14.5)
SODIUM SERPL-SCNC: 140 MMOL/L — SIGNIFICANT CHANGE UP (ref 135–145)
WBC # BLD: 8.94 K/UL — SIGNIFICANT CHANGE UP (ref 3.8–10.5)
WBC # FLD AUTO: 8.94 K/UL — SIGNIFICANT CHANGE UP (ref 3.8–10.5)

## 2022-12-07 PROCEDURE — 99233 SBSQ HOSP IP/OBS HIGH 50: CPT

## 2022-12-07 RX ORDER — DAPAGLIFLOZIN 10 MG/1
10 TABLET, FILM COATED ORAL EVERY 24 HOURS
Refills: 0 | Status: DISCONTINUED | OUTPATIENT
Start: 2022-12-07 | End: 2022-12-09

## 2022-12-07 RX ORDER — DAPAGLIFLOZIN 10 MG/1
1 TABLET, FILM COATED ORAL
Qty: 30 | Refills: 0
Start: 2022-12-07 | End: 2023-01-05

## 2022-12-07 RX ADMIN — MEROPENEM 100 MILLIGRAM(S): 1 INJECTION INTRAVENOUS at 14:47

## 2022-12-07 RX ADMIN — Medication 650 MILLIGRAM(S): at 11:16

## 2022-12-07 RX ADMIN — Medication 50 MILLIGRAM(S): at 11:15

## 2022-12-07 RX ADMIN — MEROPENEM 100 MILLIGRAM(S): 1 INJECTION INTRAVENOUS at 05:35

## 2022-12-07 RX ADMIN — Medication 650 MILLIGRAM(S): at 20:06

## 2022-12-07 RX ADMIN — ATORVASTATIN CALCIUM 10 MILLIGRAM(S): 80 TABLET, FILM COATED ORAL at 22:32

## 2022-12-07 RX ADMIN — WARFARIN SODIUM 6 MILLIGRAM(S): 2.5 TABLET ORAL at 22:33

## 2022-12-07 RX ADMIN — Medication 120 MILLIGRAM(S): at 11:15

## 2022-12-07 RX ADMIN — GABAPENTIN 800 MILLIGRAM(S): 400 CAPSULE ORAL at 05:35

## 2022-12-07 RX ADMIN — CHOLESTYRAMINE 4 GRAM(S): 4 POWDER, FOR SUSPENSION ORAL at 11:14

## 2022-12-07 RX ADMIN — Medication 40 MILLIEQUIVALENT(S): at 11:17

## 2022-12-07 RX ADMIN — Medication 10 MILLIGRAM(S): at 11:15

## 2022-12-07 RX ADMIN — Medication 1 APPLICATION(S): at 11:21

## 2022-12-07 RX ADMIN — MONTELUKAST 10 MILLIGRAM(S): 4 TABLET, CHEWABLE ORAL at 22:33

## 2022-12-07 RX ADMIN — MEROPENEM 100 MILLIGRAM(S): 1 INJECTION INTRAVENOUS at 22:30

## 2022-12-07 RX ADMIN — ZOLPIDEM TARTRATE 5 MILLIGRAM(S): 10 TABLET ORAL at 22:33

## 2022-12-07 RX ADMIN — Medication 120 MILLIGRAM(S): at 23:50

## 2022-12-07 RX ADMIN — DAPAGLIFLOZIN 10 MILLIGRAM(S): 10 TABLET, FILM COATED ORAL at 11:15

## 2022-12-07 RX ADMIN — GABAPENTIN 800 MILLIGRAM(S): 400 CAPSULE ORAL at 14:46

## 2022-12-07 RX ADMIN — GABAPENTIN 800 MILLIGRAM(S): 400 CAPSULE ORAL at 22:30

## 2022-12-07 RX ADMIN — Medication 40 MILLIGRAM(S): at 11:15

## 2022-12-07 NOTE — PROGRESS NOTE ADULT - ASSESSMENT
71 y/o F w/ PMH of a-fib (on coumadin), DM2, HFpEF, HTN, PVD, dyslipidemia, obesity, p/w SOB, managed for acute on chronic HFpEF, also w ESBL UTI.    #Acute on chronic HFpEF  -now on RA at rest and w ambulation, 93% on RA  -trop neg  -BNP 1700  -echo as above  -continue po lasix  -start Farxiga, cost checked  -appreciate cards input (Leyla)    #ESBL Ecoli UTI  -UCx w proteus and ESBL Ecoli  -s/p 4d CTX, now for 3d total Meropenem, to complete 12/8  -appreciate ID input     #B/L LE wounds  -Vascular consult appreciated, no acute vascular intervention needed    #DM2  -A1c 6.3  -no need for SSI, will d/c    #Afib, HTN, PVD, HLD  -home meds  -monitor INR, continue Coumadin    #DVT ppx- full a/c w coumadin    #anticipate home 12/8 w family support, refusing EMANI. Will complete meropenem 12/8

## 2022-12-07 NOTE — PROGRESS NOTE ADULT - SUBJECTIVE AND OBJECTIVE BOX
HOSPITALIST ATTENDING PROGRESS NOTE    Chart and meds reviewed.  Patient seen and examined.    CC: SOB    Subjective: Reports breathing feels ok, some mild SOB. Denies cough. Denies CP.     All other systems reviewed and found to be negative with the exception of what has been described above.    MEDICATIONS  (STANDING):  atorvastatin 10 milliGRAM(s) Oral at bedtime  cholestyramine Powder (Sugar-Free) 4 Gram(s) Oral two times a day  dapagliflozin 10 milliGRAM(s) Oral every 24 hours  furosemide    Tablet 40 milliGRAM(s) Oral daily  gabapentin 800 milliGRAM(s) Oral three times a day  influenza  Vaccine (HIGH DOSE) 0.7 milliLiter(s) IntraMuscular once  meropenem  IVPB 1000 milliGRAM(s) IV Intermittent every 8 hours  meropenem  IVPB      metoprolol succinate ER 50 milliGRAM(s) Oral daily  montelukast 10 milliGRAM(s) Oral at bedtime  potassium chloride    Tablet ER 40 milliEquivalent(s) Oral daily  predniSONE   Tablet 10 milliGRAM(s) Oral daily  silver sulfADIAZINE 1% Cream 1 Application(s) Topical daily  verapamil 120 milliGRAM(s) Oral two times a day  warfarin 6 milliGRAM(s) Oral at bedtime    MEDICATIONS  (PRN):  acetaminophen     Tablet .. 650 milliGRAM(s) Oral every 6 hours PRN Temp greater or equal to 38C (100.4F), Mild Pain (1 - 3)  acetaminophen     Tablet .. 325 milliGRAM(s) Oral once PRN Mild Pain (1 - 3)  albuterol    90 MICROgram(s) HFA Inhaler 2 Puff(s) Inhalation every 4 hours PRN Shortness of Breath  aluminum hydroxide/magnesium hydroxide/simethicone Suspension 30 milliLiter(s) Oral every 4 hours PRN Dyspepsia  ondansetron Injectable 4 milliGRAM(s) IV Push every 6 hours PRN Nausea and/or Vomiting  zolpidem 5 milliGRAM(s) Oral at bedtime PRN Insomnia  zolpidem 5 milliGRAM(s) Oral at bedtime PRN Insomnia      VITALS:  T(F): 97.7 (12-07-22 @ 08:06), Max: 99.3 (12-06-22 @ 20:23)  HR: 95 (12-07-22 @ 08:06) (84 - 97)  BP: 142/72 (12-07-22 @ 08:06) (113/63 - 165/76)  RR: 18 (12-07-22 @ 08:06) (18 - 19)  SpO2: 99% (12-07-22 @ 08:06) (96% - 99%)  Wt(kg): --    I&O's Summary    06 Dec 2022 07:01  -  07 Dec 2022 07:00  --------------------------------------------------------  IN: 170 mL / OUT: 0 mL / NET: 170 mL        CAPILLARY BLOOD GLUCOSE      POCT Blood Glucose.: 262 mg/dL (07 Dec 2022 11:43)  POCT Blood Glucose.: 94 mg/dL (07 Dec 2022 07:32)  POCT Blood Glucose.: 162 mg/dL (06 Dec 2022 21:11)  POCT Blood Glucose.: 250 mg/dL (06 Dec 2022 16:55)      PHYSICAL EXAM:  Gen: NAD  HEENT:  pupils equal and reactive, EOMI, no oropharyngeal lesions, erythema, exudates, oral thrush  NECK:   supple, no carotid bruits, no palpable lymph nodes, no thyromegaly  CV:  +S1, +S2, regular, no murmurs or rubs  RESP:   lungs clear to auscultation bilaterally, no wheezing, rales, rhonchi, good air entry bilaterally  BREAST:  not examined  GI:  abdomen soft, non-tender, non-distended, normal BS, no bruits, no abdominal masses, no palpable masses  RECTAL:  not examined  :  not examined  MSK:   normal muscle tone, no atrophy, no rigidity, no contractions  EXT:  no clubbing, no cyanosis, no edema, no calf pain, swelling or erythema  VASCULAR:  pulses equal and symmetric in the upper and lower extremities  NEURO:  AAOX3, no focal neurological deficits, follows all commands, able to move extremities spontaneously  SKIN:  no ulcers, lesions or rashes    LABS:                            13.3   8.94  )-----------( 401      ( 07 Dec 2022 07:51 )             41.4     12-07    140  |  107  |  25<H>  ----------------------------<  105<H>  3.7   |  30  |  0.60    Ca    9.0      07 Dec 2022 07:51            PT/INR - ( 07 Dec 2022 07:51 )   PT: 27.1 sec;   INR: 2.32 ratio         PTT - ( 07 Dec 2022 07:51 )  PTT:37.9 sec    CULTURES:  UCx 12/1 >100K proteus, 50-99K ESBL Ecoli    Additional results/Imaging, I have personally reviewed:  CTA PE protocol 11/29/22: No pulmonary embolus. Cardiomegaly and patchy groundglass opacities are nonspecific. Correlate for pulmonary edema versus infection.    CXR 12/1/22: 1. Diffuse bilateral groundglass/reticulonodular opacities which may be cardiogenic in nature or due to inflammatory/infectious disease such as viral illness. 2. Marginally prominent cardiac silhouette with calcified mitral annulus. 3. Linear atelectasis in the right middle lobe.    Echo 11/30/22: Endocardium is not well visualized, however, overall left ventricular systolic function appears normal. Estimated left ventricular ejection fraction is 55%. Biatrial enlargement. Moderate to severe aortic stenosis. Mild tricuspid regurgitation. Moderate pulmonary hypertension.

## 2022-12-08 LAB
ANION GAP SERPL CALC-SCNC: 3 MMOL/L — LOW (ref 5–17)
BUN SERPL-MCNC: 20 MG/DL — SIGNIFICANT CHANGE UP (ref 7–23)
CALCIUM SERPL-MCNC: 9 MG/DL — SIGNIFICANT CHANGE UP (ref 8.5–10.1)
CHLORIDE SERPL-SCNC: 107 MMOL/L — SIGNIFICANT CHANGE UP (ref 96–108)
CO2 SERPL-SCNC: 30 MMOL/L — SIGNIFICANT CHANGE UP (ref 22–31)
CREAT SERPL-MCNC: 0.52 MG/DL — SIGNIFICANT CHANGE UP (ref 0.5–1.3)
EGFR: 98 ML/MIN/1.73M2 — SIGNIFICANT CHANGE UP
GLUCOSE SERPL-MCNC: 93 MG/DL — SIGNIFICANT CHANGE UP (ref 70–99)
INR BLD: 2.66 RATIO — HIGH (ref 0.88–1.16)
POTASSIUM SERPL-MCNC: 3.9 MMOL/L — SIGNIFICANT CHANGE UP (ref 3.5–5.3)
POTASSIUM SERPL-SCNC: 3.9 MMOL/L — SIGNIFICANT CHANGE UP (ref 3.5–5.3)
PROTHROM AB SERPL-ACNC: 31.2 SEC — HIGH (ref 10.5–13.4)
SODIUM SERPL-SCNC: 140 MMOL/L — SIGNIFICANT CHANGE UP (ref 135–145)

## 2022-12-08 PROCEDURE — 99232 SBSQ HOSP IP/OBS MODERATE 35: CPT

## 2022-12-08 RX ORDER — CLOTRIMAZOLE AND BETAMETHASONE DIPROPIONATE 10; .5 MG/G; MG/G
1 CREAM TOPICAL
Refills: 0 | Status: DISCONTINUED | OUTPATIENT
Start: 2022-12-08 | End: 2022-12-09

## 2022-12-08 RX ADMIN — Medication 120 MILLIGRAM(S): at 10:38

## 2022-12-08 RX ADMIN — Medication 1 APPLICATION(S): at 14:05

## 2022-12-08 RX ADMIN — GABAPENTIN 800 MILLIGRAM(S): 400 CAPSULE ORAL at 22:18

## 2022-12-08 RX ADMIN — CHOLESTYRAMINE 4 GRAM(S): 4 POWDER, FOR SUSPENSION ORAL at 10:35

## 2022-12-08 RX ADMIN — CHOLESTYRAMINE 4 GRAM(S): 4 POWDER, FOR SUSPENSION ORAL at 22:25

## 2022-12-08 RX ADMIN — ZOLPIDEM TARTRATE 5 MILLIGRAM(S): 10 TABLET ORAL at 00:39

## 2022-12-08 RX ADMIN — GABAPENTIN 800 MILLIGRAM(S): 400 CAPSULE ORAL at 14:05

## 2022-12-08 RX ADMIN — GABAPENTIN 800 MILLIGRAM(S): 400 CAPSULE ORAL at 06:25

## 2022-12-08 RX ADMIN — Medication 40 MILLIGRAM(S): at 10:40

## 2022-12-08 RX ADMIN — MONTELUKAST 10 MILLIGRAM(S): 4 TABLET, CHEWABLE ORAL at 22:23

## 2022-12-08 RX ADMIN — MEROPENEM 100 MILLIGRAM(S): 1 INJECTION INTRAVENOUS at 22:17

## 2022-12-08 RX ADMIN — Medication 120 MILLIGRAM(S): at 22:24

## 2022-12-08 RX ADMIN — Medication 50 MILLIGRAM(S): at 10:40

## 2022-12-08 RX ADMIN — ZOLPIDEM TARTRATE 5 MILLIGRAM(S): 10 TABLET ORAL at 23:30

## 2022-12-08 RX ADMIN — Medication 40 MILLIEQUIVALENT(S): at 14:04

## 2022-12-08 RX ADMIN — ATORVASTATIN CALCIUM 10 MILLIGRAM(S): 80 TABLET, FILM COATED ORAL at 22:24

## 2022-12-08 RX ADMIN — WARFARIN SODIUM 6 MILLIGRAM(S): 2.5 TABLET ORAL at 22:23

## 2022-12-08 RX ADMIN — CLOTRIMAZOLE AND BETAMETHASONE DIPROPIONATE 1 APPLICATION(S): 10; .5 CREAM TOPICAL at 19:02

## 2022-12-08 RX ADMIN — DAPAGLIFLOZIN 10 MILLIGRAM(S): 10 TABLET, FILM COATED ORAL at 10:39

## 2022-12-08 RX ADMIN — MEROPENEM 100 MILLIGRAM(S): 1 INJECTION INTRAVENOUS at 14:05

## 2022-12-08 RX ADMIN — Medication 10 MILLIGRAM(S): at 10:40

## 2022-12-08 RX ADMIN — MEROPENEM 100 MILLIGRAM(S): 1 INJECTION INTRAVENOUS at 06:25

## 2022-12-08 NOTE — PROGRESS NOTE ADULT - SUBJECTIVE AND OBJECTIVE BOX
CC: SOB    Subjective:   12/8: Pt complaining of not feeling well, poor urine output, vaginal discomfort, burning.       All other systems reviewed and found to be negative with the exception of what has been described above.        Vital Signs Last 24 Hrs  T(C): 36.4 (08 Dec 2022 08:11), Max: 36.9 (07 Dec 2022 20:10)  T(F): 97.5 (08 Dec 2022 08:11), Max: 98.5 (07 Dec 2022 20:10)  HR: 101 (08 Dec 2022 08:11) (90 - 101)  BP: 162/90 (08 Dec 2022 08:11) (104/56 - 162/90)  BP(mean): --  RR: 18 (08 Dec 2022 08:11) (17 - 18)  SpO2: 96% (08 Dec 2022 08:11) (94% - 96%)    Parameters below as of 08 Dec 2022 08:11  Patient On (Oxygen Delivery Method): nasal cannula            PHYSICAL EXAM:  Gen: NAD  HEENT:  pupils equal and reactive, EOMI, no oropharyngeal lesions, erythema, exudates, oral thrush  NECK:   supple, no carotid bruits, no palpable lymph nodes, no thyromegaly  CV:  +S1, +S2, regular, no murmurs or rubs  RESP:   lungs clear to auscultation bilaterally, no wheezing, rales, rhonchi, good air entry bilaterally  GI:  abdomen soft, non-tender, non-distended, normal BS, no bruits, no abdominal masses, no palpable masses  MSK:   normal muscle tone, no atrophy, no rigidity, no contractions  EXT:  no clubbing, no cyanosis, no edema, no calf pain, swelling or erythema  VASCULAR:  pulses equal and symmetric in the upper and lower extremities  NEURO:  AAOX3, no focal neurological deficits, follows all commands, able to move extremities spontaneously  SKIN:  no ulcers, lesions or rashes      MEDICATIONS  (STANDING):  atorvastatin 10 milliGRAM(s) Oral at bedtime  cholestyramine Powder (Sugar-Free) 4 Gram(s) Oral two times a day  clotrimazole/betamethasone Cream 1 Application(s) Topical two times a day  dapagliflozin 10 milliGRAM(s) Oral every 24 hours  furosemide    Tablet 40 milliGRAM(s) Oral daily  gabapentin 800 milliGRAM(s) Oral three times a day  influenza  Vaccine (HIGH DOSE) 0.7 milliLiter(s) IntraMuscular once  meropenem  IVPB 1000 milliGRAM(s) IV Intermittent every 8 hours  meropenem  IVPB      metoprolol succinate ER 50 milliGRAM(s) Oral daily  montelukast 10 milliGRAM(s) Oral at bedtime  potassium chloride    Tablet ER 40 milliEquivalent(s) Oral daily  predniSONE   Tablet 10 milliGRAM(s) Oral daily  silver sulfADIAZINE 1% Cream 1 Application(s) Topical daily  verapamil 120 milliGRAM(s) Oral two times a day  warfarin 6 milliGRAM(s) Oral at bedtime    MEDICATIONS  (PRN):  acetaminophen     Tablet .. 650 milliGRAM(s) Oral every 6 hours PRN Temp greater or equal to 38C (100.4F), Mild Pain (1 - 3)  acetaminophen     Tablet .. 325 milliGRAM(s) Oral once PRN Mild Pain (1 - 3)  albuterol    90 MICROgram(s) HFA Inhaler 2 Puff(s) Inhalation every 4 hours PRN Shortness of Breath  aluminum hydroxide/magnesium hydroxide/simethicone Suspension 30 milliLiter(s) Oral every 4 hours PRN Dyspepsia  ondansetron Injectable 4 milliGRAM(s) IV Push every 6 hours PRN Nausea and/or Vomiting  zolpidem 5 milliGRAM(s) Oral at bedtime PRN Insomnia                                13.3   8.94  )-----------( 401      ( 07 Dec 2022 07:51 )             41.4     12-08    140  |  107  |  20  ----------------------------<  93  3.9   |  30  |  0.52    Ca    9.0      08 Dec 2022 08:11      CAPILLARY BLOOD GLUCOSE          PT/INR - ( 08 Dec 2022 08:11 )   PT: 31.2 sec;   INR: 2.66 ratio         PTT - ( 07 Dec 2022 07:51 )  PTT:37.9 sec          Assessment and Plan:  73 y/o F w/ PMH of a-fib (on coumadin), DM2, HFpEF, HTN, PVD, dyslipidemia, obesity, p/w SOB, managed for acute on chronic HFpEF, also w ESBL UTI.    #Acute on chronic HFpEF  -now on RA at rest and w ambulation, 93% on RA  -trop neg  -BNP 1700  -echo as above  -continue po lasix  -started Farxiga, cost checked  -appreciate cards input (Leyla)    #ESBL Ecoli UTI  -UCx w proteus and ESBL Ecoli  -s/p 4d CTX, now for 3d total Meropenem, to complete 12/8  -appreciate ID input  -checked bladder scan, not retaining urine   -clotrimazole cream for vaginal discomfort/yeast infection       #B/L LE wounds  -Vascular consult appreciated, no acute vascular intervention needed    #DM2  -A1c 6.3  -no need for SSI, will d/c    #Afib, HTN, PVD, HLD  -home meds  -c/w coumadin for goal INR 2-3    #DVT ppx- full a/c w coumadin    #anticipate home 12/9 w family support, refusing EMANI. Will complete meropenem 12/8.  started clotrimazole for vaginal discomfort.

## 2022-12-08 NOTE — PROGRESS NOTE ADULT - SUBJECTIVE AND OBJECTIVE BOX
Date of service: 12-08-22 @ 16:24      Patient has burning with urination; afebrile  Sitting in chair      ROS: no fever or chills; denies dizziness, no HA, no SOB or cough, no abdominal pain, no diarrhea or constipation;  no urinary frequency, no legs pain, no rashes    MEDICATIONS  (STANDING):  atorvastatin 10 milliGRAM(s) Oral at bedtime  cholestyramine Powder (Sugar-Free) 4 Gram(s) Oral two times a day  clotrimazole/betamethasone Cream 1 Application(s) Topical two times a day  dapagliflozin 10 milliGRAM(s) Oral every 24 hours  furosemide    Tablet 40 milliGRAM(s) Oral daily  gabapentin 800 milliGRAM(s) Oral three times a day  influenza  Vaccine (HIGH DOSE) 0.7 milliLiter(s) IntraMuscular once  meropenem  IVPB 1000 milliGRAM(s) IV Intermittent every 8 hours  meropenem  IVPB      metoprolol succinate ER 50 milliGRAM(s) Oral daily  montelukast 10 milliGRAM(s) Oral at bedtime  potassium chloride    Tablet ER 40 milliEquivalent(s) Oral daily  predniSONE   Tablet 10 milliGRAM(s) Oral daily  silver sulfADIAZINE 1% Cream 1 Application(s) Topical daily  verapamil 120 milliGRAM(s) Oral two times a day  warfarin 6 milliGRAM(s) Oral at bedtime    MEDICATIONS  (PRN):  acetaminophen     Tablet .. 650 milliGRAM(s) Oral every 6 hours PRN Temp greater or equal to 38C (100.4F), Mild Pain (1 - 3)  acetaminophen     Tablet .. 325 milliGRAM(s) Oral once PRN Mild Pain (1 - 3)  albuterol    90 MICROgram(s) HFA Inhaler 2 Puff(s) Inhalation every 4 hours PRN Shortness of Breath  aluminum hydroxide/magnesium hydroxide/simethicone Suspension 30 milliLiter(s) Oral every 4 hours PRN Dyspepsia  ondansetron Injectable 4 milliGRAM(s) IV Push every 6 hours PRN Nausea and/or Vomiting  zolpidem 5 milliGRAM(s) Oral at bedtime PRN Insomnia      Vital Signs Last 24 Hrs  T(C): 36.4 (08 Dec 2022 08:11), Max: 36.9 (07 Dec 2022 20:10)  T(F): 97.5 (08 Dec 2022 08:11), Max: 98.5 (07 Dec 2022 20:10)  HR: 101 (08 Dec 2022 08:11) (90 - 101)  BP: 162/90 (08 Dec 2022 08:11) (104/56 - 162/90)  BP(mean): --  RR: 18 (08 Dec 2022 08:11) (17 - 18)  SpO2: 96% (08 Dec 2022 08:11) (94% - 96%)    Parameters below as of 08 Dec 2022 08:11  Patient On (Oxygen Delivery Method): nasal cannula            Physical Exam:        Constitutional: frail looking  HEENT: NC/AT, EOMI, PERRLA, conjunctivae clear; ears and nose atraumatic; pharynx clear  Neck: supple; thyroid not palpable  Back: no tenderness  Respiratory: respiratory effort normal; clear to auscultation  Cardiovascular: S1S2 regular, no murmurs  Abdomen: soft, not tender, not distended, positive BS; no liver or spleen organomegaly  Genitourinary: no suprapubic tenderness  Musculoskeletal: no muscle tenderness, no joint swelling or tenderness  Neurological/ Psychiatric: AxOx3, judgement and insight normal;  moving all extremities  Skin: no rashes; no palpable lesions    Labs: all available labs reviewed                        12.6   7.32  )-----------( 364      ( 06 Dec 2022 06:55 )             39.1     12-06    139  |  104  |  19  ----------------------------<  112<H>  3.5   |  30  |  0.59    Ca    9.2      06 Dec 2022 06:55         Culture - Urine (12.01.22 @ 10:49)    -  Amikacin: S <=16    -  Amikacin: S <=16    -  Amoxicillin/Clavulanic Acid: S <=8/4    -  Amoxicillin/Clavulanic Acid: S <=8/4    -  Ampicillin: R >16 These ampicillin results predict results for amoxicillin    -  Ampicillin: R >16 These ampicillin results predict results for amoxicillin    -  Ampicillin/Sulbactam: I 16/8 Enterobacter, Klebsiella aerogenes, Citrobacter, and Serratia may develop resistance during prolonged therapy (3-4 days)    -  Ampicillin/Sulbactam: S <=4/2 Enterobacter, Klebsiella aerogenes, Citrobacter, and Serratia may develop resistance during prolonged therapy (3-4 days)    -  Cefazolin: R >16 For uncomplicated UTI with K. pneumoniae, E. coli, or P. mirablis: TOYA <=16 is sensitive and TOYA >=32 is resistant. This also predicts results for oral agents cefaclor, cefdinir, cefpodoxime, cefprozil, cefuroxime axetil, cephalexin and locarbef for uncomplicated UTI. Note that some isolates may be susceptible to these agents while testing resistant to cefazolin.    -  Cefazolin: S 4 For uncomplicated UTI with K. pneumoniae, E. coli, or P. mirablis: TOYA <=16 is sensitive and TOYA >=32 is resistant. This also predicts results for oral agents cefaclor, cefdinir, cefpodoxime, cefprozil, cefuroxime axetil, cephalexin and locarbef for uncomplicated UTI. Note that some isolates may be susceptible to these agents while testing resistant to cefazolin.    -  Aztreonam: S <=4    -  Aztreonam: R >16    -  Cefepime: S <=2    -  Cefepime: R >16    -  Cefoxitin: S <=8    -  Ceftriaxone: S <=1 Enterobacter, Klebsiella aerogenes, Citrobacter, and Serratia may develop resistance during prolonged therapy    -  Ceftriaxone: R >32 Enterobacter, Klebsiella aerogenes, Citrobacter, and Serratia may develop resistance during prolonged therapy    -  Ciprofloxacin: R >2    -  Ciprofloxacin: R >2    -  Ertapenem: S <=0.5    -  Ertapenem: S <=0.5    -  Gentamicin: S <=2    -  Gentamicin: S <=2    -  Imipenem: S <=1    -  Levofloxacin: R >4    -  Levofloxacin: R 4    -  Meropenem: S <=1    -  Meropenem: S <=1    -  Nitrofurantoin: S <=32 Should not be used to treat pyelonephritis    -  Nitrofurantoin: R >64 Should not be used to treat pyelonephritis    -  Piperacillin/Tazobactam: S <=8    -  Piperacillin/Tazobactam: R 32    -  Tobramycin: S <=2    -  Tobramycin: S <=2    -  Trimethoprim/Sulfamethoxazole: R >2/38    -  Trimethoprim/Sulfamethoxazole: S <=0.5/9.5    Specimen Source: Catheterized Catheterized    Culture Results:   >100,000 CFU/ml Proteus mirabilis  50,000 - 99,000 CFU/mL Escherichia coli ESBL    Organism Identification: Proteus mirabilis  Escherichia coli ESBL    Organism: Proteus mirabilis    Organism: Escherichia coli ESBL    Method Type: TOYA    Method Type: TOYA          Radiology: all available radiological tests reviewed    Advanced directives addressed: full resuscitation

## 2022-12-08 NOTE — PROGRESS NOTE ADULT - ASSESSMENT
71 y/o F w/ PMH of a-fib (on coumadin), DM2, HFpEF, HTN, PVD, dyslipidemia, obesity, admitted on 11/29 for evaluation of shortness of breath, worse with exertion; was referred to the ED from the wound care center for which she has her legs being treated after antibiotics rx for cellulitis; of note urine culture is growing ESBL E coli and Proteus mirabilis.     1. Patient admitted with dyspnea, found to have urinary tract infection with ESBL E coli plus Proteus  - iv hydration and supportive care   - serial cbc and monitor temperature   - reviewed prior medical records to evaluate for resistant or atypical pathogens   - will continue meropenem as ordered, day #3  - tolerating antibiotics without rashes or side effects   - most likely burning on urination is due to fungal vaginitis in this obese diabetic patient on antibiotics; will order clotrimazole to the affected area   - contact isolation  - wound care to legs  - elevate legs   2. other issues: per medicine

## 2022-12-08 NOTE — PROGRESS NOTE ADULT - PROVIDER SPECIALTY LIST ADULT
Cardiology
Hospitalist
Cardiology
Hospitalist
Infectious Disease
Cardiology
Hospitalist

## 2022-12-09 VITALS
RESPIRATION RATE: 16 BRPM | HEART RATE: 88 BPM | OXYGEN SATURATION: 96 % | SYSTOLIC BLOOD PRESSURE: 120 MMHG | DIASTOLIC BLOOD PRESSURE: 65 MMHG

## 2022-12-09 LAB
INR BLD: 2.82 RATIO — HIGH (ref 0.88–1.16)
PROTHROM AB SERPL-ACNC: 33.1 SEC — HIGH (ref 10.5–13.4)

## 2022-12-09 PROCEDURE — 99239 HOSP IP/OBS DSCHRG MGMT >30: CPT

## 2022-12-09 RX ADMIN — CHOLESTYRAMINE 4 GRAM(S): 4 POWDER, FOR SUSPENSION ORAL at 10:36

## 2022-12-09 RX ADMIN — Medication 1 APPLICATION(S): at 13:19

## 2022-12-09 RX ADMIN — Medication 10 MILLIGRAM(S): at 10:37

## 2022-12-09 RX ADMIN — Medication 40 MILLIEQUIVALENT(S): at 13:20

## 2022-12-09 RX ADMIN — Medication 120 MILLIGRAM(S): at 10:36

## 2022-12-09 RX ADMIN — MEROPENEM 100 MILLIGRAM(S): 1 INJECTION INTRAVENOUS at 06:11

## 2022-12-09 RX ADMIN — DAPAGLIFLOZIN 10 MILLIGRAM(S): 10 TABLET, FILM COATED ORAL at 10:37

## 2022-12-09 RX ADMIN — GABAPENTIN 800 MILLIGRAM(S): 400 CAPSULE ORAL at 13:20

## 2022-12-09 RX ADMIN — GABAPENTIN 800 MILLIGRAM(S): 400 CAPSULE ORAL at 06:11

## 2022-12-09 RX ADMIN — Medication 50 MILLIGRAM(S): at 10:37

## 2022-12-13 ENCOUNTER — OUTPATIENT (OUTPATIENT)
Dept: OUTPATIENT SERVICES | Facility: HOSPITAL | Age: 72
LOS: 1 days | End: 2022-12-13
Payer: MEDICARE

## 2022-12-13 DIAGNOSIS — L97.819 NON-PRESSURE CHRONIC ULCER OF OTHER PART OF RIGHT LOWER LEG WITH UNSPECIFIED SEVERITY: ICD-10-CM

## 2022-12-13 DIAGNOSIS — Z98.890 OTHER SPECIFIED POSTPROCEDURAL STATES: Chronic | ICD-10-CM

## 2022-12-13 DIAGNOSIS — K95.09 OTHER COMPLICATIONS OF GASTRIC BAND PROCEDURE: Chronic | ICD-10-CM

## 2022-12-13 PROCEDURE — 99212 OFFICE O/P EST SF 10 MIN: CPT

## 2022-12-14 ENCOUNTER — TRANSCRIPTION ENCOUNTER (OUTPATIENT)
Age: 72
End: 2022-12-14

## 2022-12-14 ENCOUNTER — INPATIENT (INPATIENT)
Facility: HOSPITAL | Age: 72
LOS: 8 days | Discharge: HOME | DRG: 286 | End: 2022-12-23
Attending: THORACIC SURGERY (CARDIOTHORACIC VASCULAR SURGERY) | Admitting: THORACIC SURGERY (CARDIOTHORACIC VASCULAR SURGERY)
Payer: MEDICARE

## 2022-12-14 VITALS
TEMPERATURE: 99 F | RESPIRATION RATE: 18 BRPM | OXYGEN SATURATION: 95 % | DIASTOLIC BLOOD PRESSURE: 74 MMHG | WEIGHT: 250 LBS | HEIGHT: 70 IN | SYSTOLIC BLOOD PRESSURE: 114 MMHG | HEART RATE: 94 BPM

## 2022-12-14 DIAGNOSIS — Z98.890 OTHER SPECIFIED POSTPROCEDURAL STATES: Chronic | ICD-10-CM

## 2022-12-14 DIAGNOSIS — K95.09 OTHER COMPLICATIONS OF GASTRIC BAND PROCEDURE: Chronic | ICD-10-CM

## 2022-12-14 DIAGNOSIS — I35.0 NONRHEUMATIC AORTIC (VALVE) STENOSIS: ICD-10-CM

## 2022-12-14 DIAGNOSIS — E78.5 HYPERLIPIDEMIA, UNSPECIFIED: ICD-10-CM

## 2022-12-14 DIAGNOSIS — R06.02 SHORTNESS OF BREATH: ICD-10-CM

## 2022-12-14 DIAGNOSIS — I48.91 UNSPECIFIED ATRIAL FIBRILLATION: ICD-10-CM

## 2022-12-14 DIAGNOSIS — L97.819 NON-PRESSURE CHRONIC ULCER OF OTHER PART OF RIGHT LOWER LEG WITH UNSPECIFIED SEVERITY: ICD-10-CM

## 2022-12-14 DIAGNOSIS — E11.9 TYPE 2 DIABETES MELLITUS WITHOUT COMPLICATIONS: ICD-10-CM

## 2022-12-14 DIAGNOSIS — I10 ESSENTIAL (PRIMARY) HYPERTENSION: ICD-10-CM

## 2022-12-14 LAB
A1C WITH ESTIMATED AVERAGE GLUCOSE RESULT: 6.3 % — HIGH (ref 4–5.6)
ALBUMIN SERPL ELPH-MCNC: 2.9 G/DL — LOW (ref 3.3–5.2)
ALBUMIN SERPL ELPH-MCNC: 3.1 G/DL — LOW (ref 3.3–5.2)
ALP SERPL-CCNC: 64 U/L — SIGNIFICANT CHANGE UP (ref 40–120)
ALP SERPL-CCNC: 66 U/L — SIGNIFICANT CHANGE UP (ref 40–120)
ALT FLD-CCNC: 21 U/L — SIGNIFICANT CHANGE UP
ALT FLD-CCNC: 22 U/L — SIGNIFICANT CHANGE UP
ANION GAP SERPL CALC-SCNC: 12 MMOL/L — SIGNIFICANT CHANGE UP (ref 5–17)
ANION GAP SERPL CALC-SCNC: 14 MMOL/L — SIGNIFICANT CHANGE UP (ref 5–17)
APTT BLD: 47.7 SEC — HIGH (ref 27.5–35.5)
APTT BLD: 51.1 SEC — HIGH (ref 27.5–35.5)
AST SERPL-CCNC: 17 U/L — SIGNIFICANT CHANGE UP
AST SERPL-CCNC: 17 U/L — SIGNIFICANT CHANGE UP
BASOPHILS # BLD AUTO: 0.05 K/UL — SIGNIFICANT CHANGE UP (ref 0–0.2)
BASOPHILS # BLD AUTO: 0.05 K/UL — SIGNIFICANT CHANGE UP (ref 0–0.2)
BASOPHILS NFR BLD AUTO: 0.4 % — SIGNIFICANT CHANGE UP (ref 0–2)
BASOPHILS NFR BLD AUTO: 0.5 % — SIGNIFICANT CHANGE UP (ref 0–2)
BILIRUB SERPL-MCNC: 0.4 MG/DL — SIGNIFICANT CHANGE UP (ref 0.4–2)
BILIRUB SERPL-MCNC: 0.5 MG/DL — SIGNIFICANT CHANGE UP (ref 0.4–2)
BLD GP AB SCN SERPL QL: SIGNIFICANT CHANGE UP
BUN SERPL-MCNC: 14.1 MG/DL — SIGNIFICANT CHANGE UP (ref 8–20)
BUN SERPL-MCNC: 14.9 MG/DL — SIGNIFICANT CHANGE UP (ref 8–20)
CALCIUM SERPL-MCNC: 9 MG/DL — SIGNIFICANT CHANGE UP (ref 8.4–10.5)
CALCIUM SERPL-MCNC: 9.2 MG/DL — SIGNIFICANT CHANGE UP (ref 8.4–10.5)
CHLORIDE SERPL-SCNC: 100 MMOL/L — SIGNIFICANT CHANGE UP (ref 96–108)
CHLORIDE SERPL-SCNC: 101 MMOL/L — SIGNIFICANT CHANGE UP (ref 96–108)
CO2 SERPL-SCNC: 24 MMOL/L — SIGNIFICANT CHANGE UP (ref 22–29)
CO2 SERPL-SCNC: 25 MMOL/L — SIGNIFICANT CHANGE UP (ref 22–29)
CREAT SERPL-MCNC: 0.57 MG/DL — SIGNIFICANT CHANGE UP (ref 0.5–1.3)
CREAT SERPL-MCNC: 0.59 MG/DL — SIGNIFICANT CHANGE UP (ref 0.5–1.3)
EGFR: 95 ML/MIN/1.73M2 — SIGNIFICANT CHANGE UP
EGFR: 96 ML/MIN/1.73M2 — SIGNIFICANT CHANGE UP
EOSINOPHIL # BLD AUTO: 0.01 K/UL — SIGNIFICANT CHANGE UP (ref 0–0.5)
EOSINOPHIL # BLD AUTO: 0.04 K/UL — SIGNIFICANT CHANGE UP (ref 0–0.5)
EOSINOPHIL NFR BLD AUTO: 0.1 % — SIGNIFICANT CHANGE UP (ref 0–6)
EOSINOPHIL NFR BLD AUTO: 0.4 % — SIGNIFICANT CHANGE UP (ref 0–6)
ESTIMATED AVERAGE GLUCOSE: 134 MG/DL — HIGH (ref 68–114)
GLUCOSE SERPL-MCNC: 103 MG/DL — HIGH (ref 70–99)
GLUCOSE SERPL-MCNC: 124 MG/DL — HIGH (ref 70–99)
HCT VFR BLD CALC: 37.9 % — SIGNIFICANT CHANGE UP (ref 34.5–45)
HCT VFR BLD CALC: 39.3 % — SIGNIFICANT CHANGE UP (ref 34.5–45)
HGB BLD-MCNC: 12.1 G/DL — SIGNIFICANT CHANGE UP (ref 11.5–15.5)
HGB BLD-MCNC: 12.7 G/DL — SIGNIFICANT CHANGE UP (ref 11.5–15.5)
IMM GRANULOCYTES NFR BLD AUTO: 0.6 % — SIGNIFICANT CHANGE UP (ref 0–0.9)
IMM GRANULOCYTES NFR BLD AUTO: 0.8 % — SIGNIFICANT CHANGE UP (ref 0–0.9)
INR BLD: 4.1 RATIO — HIGH (ref 0.88–1.16)
INR BLD: 4.53 RATIO — HIGH (ref 0.88–1.16)
LYMPHOCYTES # BLD AUTO: 0.79 K/UL — LOW (ref 1–3.3)
LYMPHOCYTES # BLD AUTO: 1.24 K/UL — SIGNIFICANT CHANGE UP (ref 1–3.3)
LYMPHOCYTES # BLD AUTO: 12.7 % — LOW (ref 13–44)
LYMPHOCYTES # BLD AUTO: 6.9 % — LOW (ref 13–44)
MCHC RBC-ENTMCNC: 29.7 PG — SIGNIFICANT CHANGE UP (ref 27–34)
MCHC RBC-ENTMCNC: 30 PG — SIGNIFICANT CHANGE UP (ref 27–34)
MCHC RBC-ENTMCNC: 31.9 GM/DL — LOW (ref 32–36)
MCHC RBC-ENTMCNC: 32.3 GM/DL — SIGNIFICANT CHANGE UP (ref 32–36)
MCV RBC AUTO: 92.9 FL — SIGNIFICANT CHANGE UP (ref 80–100)
MCV RBC AUTO: 92.9 FL — SIGNIFICANT CHANGE UP (ref 80–100)
MONOCYTES # BLD AUTO: 0.44 K/UL — SIGNIFICANT CHANGE UP (ref 0–0.9)
MONOCYTES # BLD AUTO: 0.75 K/UL — SIGNIFICANT CHANGE UP (ref 0–0.9)
MONOCYTES NFR BLD AUTO: 3.9 % — SIGNIFICANT CHANGE UP (ref 2–14)
MONOCYTES NFR BLD AUTO: 7.7 % — SIGNIFICANT CHANGE UP (ref 2–14)
NEUTROPHILS # BLD AUTO: 10 K/UL — HIGH (ref 1.8–7.4)
NEUTROPHILS # BLD AUTO: 7.6 K/UL — HIGH (ref 1.8–7.4)
NEUTROPHILS NFR BLD AUTO: 78.1 % — HIGH (ref 43–77)
NEUTROPHILS NFR BLD AUTO: 87.9 % — HIGH (ref 43–77)
NT-PROBNP SERPL-SCNC: 1023 PG/ML — HIGH (ref 0–300)
NT-PROBNP SERPL-SCNC: 931 PG/ML — HIGH (ref 0–300)
PA ADP PRP-ACNC: 278 PRU — SIGNIFICANT CHANGE UP (ref 180–376)
PLATELET # BLD AUTO: 416 K/UL — HIGH (ref 150–400)
PLATELET # BLD AUTO: 439 K/UL — HIGH (ref 150–400)
POTASSIUM SERPL-MCNC: 4.1 MMOL/L — SIGNIFICANT CHANGE UP (ref 3.5–5.3)
POTASSIUM SERPL-MCNC: 4.7 MMOL/L — SIGNIFICANT CHANGE UP (ref 3.5–5.3)
POTASSIUM SERPL-SCNC: 4.1 MMOL/L — SIGNIFICANT CHANGE UP (ref 3.5–5.3)
POTASSIUM SERPL-SCNC: 4.7 MMOL/L — SIGNIFICANT CHANGE UP (ref 3.5–5.3)
PREALB SERPL-MCNC: 14 MG/DL — LOW (ref 18–38)
PROT SERPL-MCNC: 6.9 G/DL — SIGNIFICANT CHANGE UP (ref 6.6–8.7)
PROT SERPL-MCNC: 7.3 G/DL — SIGNIFICANT CHANGE UP (ref 6.6–8.7)
PROTHROM AB SERPL-ACNC: 48.3 SEC — HIGH (ref 10.5–13.4)
PROTHROM AB SERPL-ACNC: 53.3 SEC — HIGH (ref 10.5–13.4)
RAPID RVP RESULT: SIGNIFICANT CHANGE UP
RBC # BLD: 4.08 M/UL — SIGNIFICANT CHANGE UP (ref 3.8–5.2)
RBC # BLD: 4.23 M/UL — SIGNIFICANT CHANGE UP (ref 3.8–5.2)
RBC # FLD: 13.4 % — SIGNIFICANT CHANGE UP (ref 10.3–14.5)
RBC # FLD: 13.4 % — SIGNIFICANT CHANGE UP (ref 10.3–14.5)
SARS-COV-2 RNA SPEC QL NAA+PROBE: SIGNIFICANT CHANGE UP
SODIUM SERPL-SCNC: 138 MMOL/L — SIGNIFICANT CHANGE UP (ref 135–145)
SODIUM SERPL-SCNC: 138 MMOL/L — SIGNIFICANT CHANGE UP (ref 135–145)
TROPONIN T SERPL-MCNC: <0.01 NG/ML — SIGNIFICANT CHANGE UP (ref 0–0.06)
TSH SERPL-MCNC: 0.22 UIU/ML — LOW (ref 0.27–4.2)
WBC # BLD: 11.38 K/UL — HIGH (ref 3.8–10.5)
WBC # BLD: 9.74 K/UL — SIGNIFICANT CHANGE UP (ref 3.8–10.5)
WBC # FLD AUTO: 11.38 K/UL — HIGH (ref 3.8–10.5)
WBC # FLD AUTO: 9.74 K/UL — SIGNIFICANT CHANGE UP (ref 3.8–10.5)

## 2022-12-14 PROCEDURE — 99285 EMERGENCY DEPT VISIT HI MDM: CPT | Mod: CS,GC

## 2022-12-14 PROCEDURE — 71045 X-RAY EXAM CHEST 1 VIEW: CPT | Mod: 26

## 2022-12-14 PROCEDURE — 93010 ELECTROCARDIOGRAM REPORT: CPT | Mod: 76

## 2022-12-14 PROCEDURE — 93306 TTE W/DOPPLER COMPLETE: CPT | Mod: 26

## 2022-12-14 RX ORDER — GABAPENTIN 400 MG/1
800 CAPSULE ORAL THREE TIMES A DAY
Refills: 0 | Status: DISCONTINUED | OUTPATIENT
Start: 2022-12-14 | End: 2022-12-23

## 2022-12-14 RX ORDER — VERAPAMIL HCL 240 MG
120 CAPSULE, EXTENDED RELEASE PELLETS 24 HR ORAL
Refills: 0 | Status: DISCONTINUED | OUTPATIENT
Start: 2022-12-14 | End: 2022-12-22

## 2022-12-14 RX ORDER — ATORVASTATIN CALCIUM 80 MG/1
80 TABLET, FILM COATED ORAL AT BEDTIME
Refills: 0 | Status: DISCONTINUED | OUTPATIENT
Start: 2022-12-14 | End: 2022-12-23

## 2022-12-14 RX ORDER — EZETIMIBE 10 MG/1
10 TABLET ORAL DAILY
Refills: 0 | Status: DISCONTINUED | OUTPATIENT
Start: 2022-12-14 | End: 2022-12-23

## 2022-12-14 RX ORDER — SODIUM CHLORIDE 9 MG/ML
3 INJECTION INTRAMUSCULAR; INTRAVENOUS; SUBCUTANEOUS EVERY 8 HOURS
Refills: 0 | Status: DISCONTINUED | OUTPATIENT
Start: 2022-12-14 | End: 2022-12-23

## 2022-12-14 RX ORDER — FUROSEMIDE 40 MG
40 TABLET ORAL
Refills: 0 | Status: DISCONTINUED | OUTPATIENT
Start: 2022-12-14 | End: 2022-12-15

## 2022-12-14 RX ORDER — MONTELUKAST 4 MG/1
10 TABLET, CHEWABLE ORAL AT BEDTIME
Refills: 0 | Status: DISCONTINUED | OUTPATIENT
Start: 2022-12-14 | End: 2022-12-23

## 2022-12-14 RX ORDER — METOPROLOL TARTRATE 50 MG
100 TABLET ORAL DAILY
Refills: 0 | Status: DISCONTINUED | OUTPATIENT
Start: 2022-12-14 | End: 2022-12-23

## 2022-12-14 RX ORDER — ALBUTEROL 90 UG/1
2 AEROSOL, METERED ORAL EVERY 6 HOURS
Refills: 0 | Status: DISCONTINUED | OUTPATIENT
Start: 2022-12-14 | End: 2022-12-19

## 2022-12-14 RX ADMIN — ATORVASTATIN CALCIUM 80 MILLIGRAM(S): 80 TABLET, FILM COATED ORAL at 22:52

## 2022-12-14 RX ADMIN — MONTELUKAST 10 MILLIGRAM(S): 4 TABLET, CHEWABLE ORAL at 22:52

## 2022-12-14 RX ADMIN — EZETIMIBE 10 MILLIGRAM(S): 10 TABLET ORAL at 22:53

## 2022-12-14 RX ADMIN — Medication 40 MILLIGRAM(S): at 22:53

## 2022-12-14 RX ADMIN — GABAPENTIN 800 MILLIGRAM(S): 400 CAPSULE ORAL at 22:52

## 2022-12-14 RX ADMIN — Medication 100 MILLIGRAM(S): at 22:52

## 2022-12-14 NOTE — H&P ADULT - PROBLEM SELECTOR PLAN 2
Currently rate controlled  On coumadin at home  now supratherapeutic, hold  Continue BB, CCB as tolerated by HR/BP

## 2022-12-14 NOTE — ED ADULT TRIAGE NOTE - NS ED TRIAGE AVPU SCALE
Pt resting comfortably between suture placement.    Alert-The patient is alert, awake and responds to voice. The patient is oriented to time, place, and person. The triage nurse is able to obtain subjective information.

## 2022-12-14 NOTE — ED PROVIDER NOTE - OBJECTIVE STATEMENT
73yo F w/pmh a-fib (on coumadin), DM2, HFpEF, HTN, PVD, dyslipidemia, obesity, aortic stenosis presents sent by Dr. Hopper for TAVR evaluation. Patient reports chronic, worsening SOB. Denies f/ch, CP, cough, n/v, abdominal pain. Reports she was sent in from clinic.

## 2022-12-14 NOTE — ED PROVIDER NOTE - NS ED ROS FT
Constitutional: no fever, no sweats, and no chills.  CV: no chest pain, no edema.  Resp: no cough, +dyspnea  GI: no abdominal pain, no nausea and no vomiting.  MSK: no msk pain, no weakness  Skin: no lesions, and no rashes.  Neuro: no LOC, no headache, no dizziness  ROS otherwise negative except as noted in HPI.

## 2022-12-14 NOTE — H&P ADULT - ASSESSMENT
72 year old female former smoker with PMH of jmorbid obesity, AS, HTN, HLD, DM, cellulitis, bacteremia, UTI, lap band (since removed) LE venous stenosis, vascular ulcers (followed by Dr. Yoo) presents from home with complaints of worsening JETT with light exertion and lower extremity edema with associated palpitation. Patient follows with Dr. Falk (cardiology) and was advised to seek medical attention for recent TTE demonstrating severe aortic stenosis. At time of exam,  Admit to Dr. Hopper for TAVR eval.

## 2022-12-14 NOTE — H&P ADULT - PROBLEM SELECTOR PLAN 1
Severe AS seen on previous TTE from November  Now with worsening symptoms  Admit for TAVR eval and HF exacerbation  Will need repeat TTE  CTA to be completed in next 48 hours given recent metformin use  IV diuresis as tolerated   Admit to Dr. Hopper

## 2022-12-14 NOTE — ED PROVIDER NOTE - CLINICAL SUMMARY MEDICAL DECISION MAKING FREE TEXT BOX
73yo F w/pmh a-fib (on coumadin), DM2, HFpEF, HTN, PVD, dyslipidemia, obesity, aortic stenosis presents sent by Dr. Hopper for TAVR evaluation. Labs, CXR, EKG pending. CT surgery consulted.

## 2022-12-14 NOTE — ED PROVIDER NOTE - PHYSICAL EXAMINATION
General: well appearing, morbidly obese, NAD  Head: NC/AT  Cardiac: RRR, no m/r/g  Respiratory: CTABL, equal chest wall expansions, no conversational dyspnea  Abdomen: soft, ND, NT  Neuro: AAOx3, coordinated movement of all 4 extremities  Psych: calm, cooperative, normal affect  Skin: warm and dry

## 2022-12-14 NOTE — H&P ADULT - NSHPLABSRESULTS_GEN_ALL_CORE
12.7   11.38 )-----------( 439      ( 14 Dec 2022 18:00 )             39.3       12-14    138  |  100  |  14.9  ----------------------------<  103<H>  4.7   |  24.0  |  0.57    Ca    9.2      14 Dec 2022 18:00    TPro  7.3  /  Alb  3.1<L>  /  TBili  0.5  /  DBili  x   /  AST  17  /  ALT  22  /  AlkPhos  66  12-14

## 2022-12-14 NOTE — ED ADULT NURSE REASSESSMENT NOTE - NS ED NURSE REASSESS COMMENT FT1
assumed pt care at 1930 - pt currently eating a meal - on room air - maintained on cardiac monitor and pulse ox with 95% on room air. swelling to lower extremities noted. pt aware of plan for admission.

## 2022-12-14 NOTE — ED CLERICAL - NS ED CLERK NOTE PRE-ARRIVAL INFORMATION; ADDITIONAL PRE-ARRIVAL INFORMATION
This patient is enrolled in a readmission reduction program and has active care navigation. This patient can be followed up by the care navigation team within 24 hours. To arrange close follow-up or to obtain additional clinical information about this patient, please call the contact number above. Please speak with the [facility] ED Case Manager for assistance with discharge planning

## 2022-12-14 NOTE — H&P ADULT - NSHPPHYSICALEXAM_GEN_ALL_CORE
Constitutional: NAD  Neuro: A+O x 3, non-focal, speech clear and intact  CV: +S1S2, +systolic murmur  Pulm/chest: lung sounds diminished, no accessory muscle use noted  Abd: obese, +BS, soft, NT, ND  Ext: CAPPS x 4, b/l LE +2 edema  Skin: b/l LE vascular ulcera Right side healed, Left side mostly healed, warm, well perfused  Psych: calm, appropriate affect

## 2022-12-14 NOTE — CONSULT NOTE ADULT - ASSESSMENT
ASSESSMENT: Patient is a 72yf pmhx HTN, AS, DM, venous stasis ulcers presenting with SOB and admitted for evaluation for TAVR. Vascular Surgery consulted for evaluation of chronic venous ulcers. Singular ~5mm circular ulcer clean appearing without evidence of drainage or cellulitis    PLAN:    - No vascular intervention indicated at this time  - No contraindication for surgical intervention with CT surgery   - Plan discussed with Attending, Dr. Yoo

## 2022-12-14 NOTE — ED ADULT TRIAGE NOTE - CHIEF COMPLAINT QUOTE
patient sent by Cardiologist Leyla to talk to Dr. black for Aortic Valve replacement, patient has been short of breath for weeks.

## 2022-12-14 NOTE — H&P ADULT - HISTORY OF PRESENT ILLNESS
72 year old female former smoker with PMH of jmorbid obesity, AS, HTN, HLD, DM, cellulitis, bacteremia, UTI, lap band (since removed) LE venous stenosis, vascular ulcers (followed by Dr. Yoo) presents from home with complaints of worsening JETT with light exertion and lower extremity edema with associated palpitation. Patient follows with Dr. Falk (cardiology) and was advised to seek medical attention for recent TTE demonstrating severe aortic stenosis. At time of exam, Pt denies chest pain, palpitations, dizziness, headache, shortness of breath, abdominal pain or N/V/D/C. Admit to Dr. Hopper for TAVR eval.

## 2022-12-14 NOTE — ED ADULT NURSE NOTE - OBJECTIVE STATEMENT
Pt arrived in ED with c/o SOB for several weeks. Pt also reported Cardiologist sent her to ED for eval. Pt is A&Ox3, communicates effectively. Denies CP, palpitations, dizziness, chills, fevers, n/v. Resps reg, nonlabored. Continuous cardiac monitoring in progress. Noted bilat lower extremity edema more on the left. O2 sat WNL on room air. No respiratory distress noted at this time.

## 2022-12-14 NOTE — H&P ADULT - NSHPSOCIALHISTORY_GEN_ALL_CORE
Marital status:  ~50 years  Lives with:  in house, 2 stairs to get inside, 2 stairs to get to chair lift  Occupation: works at [employer] at [employer]  ADLs: indepdent  Assistive Devices: had used cane previously, however with worsening SOB has been using walker most of the time    Tobacco use: former smoker 30 years x 3PPD   Alcohol use: denies   Illicit drug use: denies Patient expressed no known problems or needs

## 2022-12-14 NOTE — CONSULT NOTE ADULT - SUBJECTIVE AND OBJECTIVE BOX
VASCULAR SURGERY CONSULT NOTE  --------------------------------------------------------------------------------------------    Vascular HPI: 72F with PMH of HTN, HLD, DM well known to Dr. Yoo for care of BLE venous ulcers recently seen on 12/13 in office for follow up of these wounds. Patient is currently admitted with SOB, worsening JETT and work up for TAVR. She states her legs have not had any drainage or increased swelling. She says Naila told her at the last appointment that she did not need to continue coming for evaluation unless she had an issue.       HPI: Patient is a 72y old  Female who presents with a chief complaint of Severe AS (14 Dec 2022 20:20)    HPI:  72 year old female former smoker with PMH of jmorbid obesity, AS, HTN, HLD, DM, cellulitis, bacteremia, UTI, lap band (since removed) LE venous stenosis, vascular ulcers (followed by Dr. Yoo) presents from home with complaints of worsening JETT with light exertion and lower extremity edema with associated palpitation. Patient follows with Dr. Falk (cardiology) and was advised to seek medical attention for recent TTE demonstrating severe aortic stenosis. At time of exam, Pt denies chest pain, palpitations, dizziness, headache, shortness of breath, abdominal pain or N/V/D/C. Admit to Dr. Hopper for TAVR eval.    (14 Dec 2022 20:20)      PAST MEDICAL & SURGICAL HISTORY:  Diabetes mellitus type II, controlled  Atrial fibrillation  Congestive heart failure  Dyspnea  Morbid obesity with BMI of 40.0-44.9, adult  Neuropathy  Peripheral venous insufficiency  Thyroid nodule  HTN (hypertension)  Cellulitis  History of esophagogastroduodenoscopy (EGD)      H/O colonoscopy  Other complications of gastric band procedure  S/P left knee arthroscopy  Status post medial meniscus repair        FAMILY HISTORY:  Family history of breast cancer in mother    Family history of diabetes mellitus in mother    [] Family history not pertinent as reviewed with the patient and family    SOCIAL HISTORY:      ALLERGIES: [This allergen will not trigger allergy alert] IV DYE, IODINE CONTRAST (Unknown)  latex (Unknown)  penicillin (Hives; Urticaria)  pregabalin (Unknown)      HOME MEDICATIONS:     CURRENT MEDICATIONS  MEDICATIONS (STANDING): atorvastatin 80 milliGRAM(s) Oral at bedtime  ezetimibe 10 milliGRAM(s) Oral daily  furosemide   Injectable 40 milliGRAM(s) IV Push two times a day  gabapentin 800 milliGRAM(s) Oral three times a day  metoprolol succinate  milliGRAM(s) Oral daily  montelukast 10 milliGRAM(s) Oral at bedtime  sodium chloride 0.9% lock flush 3 milliLiter(s) IV Push every 8 hours  verapamil 120 milliGRAM(s) Oral two times a day    MEDICATIONS (PRN):albuterol    90 MICROgram(s) HFA Inhaler 2 Puff(s) Inhalation every 6 hours PRN Shortness of Breath    --------------------------------------------------------------------------------------------    Vitals:   T(C): 36.4 (12-14-22 @ 19:06), Max: 37.1 (12-14-22 @ 15:45)  HR: 89 (12-14-22 @ 19:06) (89 - 94)  BP: 124/60 (12-14-22 @ 19:06) (114/74 - 124/60)  RR: 18 (12-14-22 @ 19:06) (18 - 18)  SpO2: 98% (12-14-22 @ 19:06) (95% - 98%)  CAPILLARY BLOOD GLUCOSE    Height (cm): 177.8 (12-14 @ 15:45)  Weight (kg): 113.4 (12-14 @ 15:45)  BMI (kg/m2): 35.9 (12-14 @ 15:45)  BSA (m2): 2.29 (12-14 @ 15:45)      PHYSICAL EXAM:   General: NAD, Lying in bed comfortably  Neuro: A+Ox3  HEENT: NC/AT  Resp: Good effort, no conversational dyspnea  Vascular: All 4 extremities warm, palpable DP/PT b/l  Skin: RLE with ~5mm circular, clean base ulcer with healthy appearing granulation tissue  Musculoskeletal: All 4 extremities moving spontaneously    --------------------------------------------------------------------------------------------    LABS  CBC (12-14 @ 18:00)                              12.7                           11.38<H>  )----------------(  439<H>     87.9<H>% Neutrophils, 6.9<L>% Lymphocytes, ANC: 10.00<H>                              39.3      BMP (12-14 @ 18:00)             138     |  100     |  14.9  		Ca++ --      Ca 9.2                ---------------------------------( 103<H>		Mg --                 4.7     |  24.0    |  0.57  			Ph --        LFTs (12-14 @ 18:00)      TPro 7.3 / Alb 3.1<L> / TBili 0.5 / DBili -- / AST 17 / ALT 22 / AlkPhos 66    Coags (12-14 @ 18:00)  aPTT 51.1<H> / INR 4.53<H> / PT 53.3<H>    --------------------------------------------------------------------------------------------

## 2022-12-15 ENCOUNTER — TRANSCRIPTION ENCOUNTER (OUTPATIENT)
Age: 72
End: 2022-12-15

## 2022-12-15 DIAGNOSIS — B96.4 PROTEUS (MIRABILIS) (MORGANII) AS THE CAUSE OF DISEASES CLASSIFIED ELSEWHERE: ICD-10-CM

## 2022-12-15 DIAGNOSIS — Z79.52 LONG TERM (CURRENT) USE OF SYSTEMIC STEROIDS: ICD-10-CM

## 2022-12-15 DIAGNOSIS — E11.40 TYPE 2 DIABETES MELLITUS WITH DIABETIC NEUROPATHY, UNSPECIFIED: ICD-10-CM

## 2022-12-15 DIAGNOSIS — Z91.040 LATEX ALLERGY STATUS: ICD-10-CM

## 2022-12-15 DIAGNOSIS — Z79.84 LONG TERM (CURRENT) USE OF ORAL HYPOGLYCEMIC DRUGS: ICD-10-CM

## 2022-12-15 DIAGNOSIS — I48.21 PERMANENT ATRIAL FIBRILLATION: ICD-10-CM

## 2022-12-15 DIAGNOSIS — E04.2 NONTOXIC MULTINODULAR GOITER: ICD-10-CM

## 2022-12-15 DIAGNOSIS — B96.20 UNSPECIFIED ESCHERICHIA COLI [E. COLI] AS THE CAUSE OF DISEASES CLASSIFIED ELSEWHERE: ICD-10-CM

## 2022-12-15 DIAGNOSIS — Z16.12 EXTENDED SPECTRUM BETA LACTAMASE (ESBL) RESISTANCE: ICD-10-CM

## 2022-12-15 DIAGNOSIS — Z79.899 OTHER LONG TERM (CURRENT) DRUG THERAPY: ICD-10-CM

## 2022-12-15 DIAGNOSIS — L97.819 NON-PRESSURE CHRONIC ULCER OF OTHER PART OF RIGHT LOWER LEG WITH UNSPECIFIED SEVERITY: ICD-10-CM

## 2022-12-15 DIAGNOSIS — N39.0 URINARY TRACT INFECTION, SITE NOT SPECIFIED: ICD-10-CM

## 2022-12-15 DIAGNOSIS — I50.33 ACUTE ON CHRONIC DIASTOLIC (CONGESTIVE) HEART FAILURE: ICD-10-CM

## 2022-12-15 DIAGNOSIS — Z79.891 LONG TERM (CURRENT) USE OF OPIATE ANALGESIC: ICD-10-CM

## 2022-12-15 DIAGNOSIS — I50.31 ACUTE DIASTOLIC (CONGESTIVE) HEART FAILURE: ICD-10-CM

## 2022-12-15 DIAGNOSIS — E11.51 TYPE 2 DIABETES MELLITUS WITH DIABETIC PERIPHERAL ANGIOPATHY WITHOUT GANGRENE: ICD-10-CM

## 2022-12-15 DIAGNOSIS — E66.01 MORBID (SEVERE) OBESITY DUE TO EXCESS CALORIES: ICD-10-CM

## 2022-12-15 DIAGNOSIS — I83.028 VARICOSE VEINS OF LEFT LOWER EXTREMITY WITH ULCER OTHER PART OF LOWER LEG: ICD-10-CM

## 2022-12-15 DIAGNOSIS — L97.829 NON-PRESSURE CHRONIC ULCER OF OTHER PART OF LEFT LOWER LEG WITH UNSPECIFIED SEVERITY: ICD-10-CM

## 2022-12-15 DIAGNOSIS — E78.5 HYPERLIPIDEMIA, UNSPECIFIED: ICD-10-CM

## 2022-12-15 DIAGNOSIS — I11.0 HYPERTENSIVE HEART DISEASE WITH HEART FAILURE: ICD-10-CM

## 2022-12-15 DIAGNOSIS — Z88.0 ALLERGY STATUS TO PENICILLIN: ICD-10-CM

## 2022-12-15 DIAGNOSIS — N76.0 ACUTE VAGINITIS: ICD-10-CM

## 2022-12-15 DIAGNOSIS — I83.018 VARICOSE VEINS OF RIGHT LOWER EXTREMITY WITH ULCER OTHER PART OF LOWER LEG: ICD-10-CM

## 2022-12-15 DIAGNOSIS — I35.0 NONRHEUMATIC AORTIC (VALVE) STENOSIS: ICD-10-CM

## 2022-12-15 DIAGNOSIS — Z79.01 LONG TERM (CURRENT) USE OF ANTICOAGULANTS: ICD-10-CM

## 2022-12-15 LAB
ALBUMIN SERPL ELPH-MCNC: 2.9 G/DL — LOW (ref 3.3–5.2)
ALP SERPL-CCNC: 63 U/L — SIGNIFICANT CHANGE UP (ref 40–120)
ALT FLD-CCNC: 21 U/L — SIGNIFICANT CHANGE UP
ANION GAP SERPL CALC-SCNC: 16 MMOL/L — SIGNIFICANT CHANGE UP (ref 5–17)
APPEARANCE UR: CLEAR — SIGNIFICANT CHANGE UP
AST SERPL-CCNC: 16 U/L — SIGNIFICANT CHANGE UP
BACTERIA # UR AUTO: ABNORMAL
BASE EXCESS BLDA CALC-SCNC: 6.1 MMOL/L — HIGH (ref -2–3)
BASOPHILS # BLD AUTO: 0.06 K/UL — SIGNIFICANT CHANGE UP (ref 0–0.2)
BASOPHILS NFR BLD AUTO: 0.8 % — SIGNIFICANT CHANGE UP (ref 0–2)
BILIRUB SERPL-MCNC: 0.6 MG/DL — SIGNIFICANT CHANGE UP (ref 0.4–2)
BILIRUB UR-MCNC: NEGATIVE — SIGNIFICANT CHANGE UP
BUN SERPL-MCNC: 14.8 MG/DL — SIGNIFICANT CHANGE UP (ref 8–20)
CALCIUM SERPL-MCNC: 9 MG/DL — SIGNIFICANT CHANGE UP (ref 8.4–10.5)
CHLORIDE SERPL-SCNC: 100 MMOL/L — SIGNIFICANT CHANGE UP (ref 96–108)
CO2 SERPL-SCNC: 26 MMOL/L — SIGNIFICANT CHANGE UP (ref 22–29)
COLOR SPEC: YELLOW — SIGNIFICANT CHANGE UP
CREAT SERPL-MCNC: 0.58 MG/DL — SIGNIFICANT CHANGE UP (ref 0.5–1.3)
DIFF PNL FLD: NEGATIVE — SIGNIFICANT CHANGE UP
EGFR: 95 ML/MIN/1.73M2 — SIGNIFICANT CHANGE UP
EOSINOPHIL # BLD AUTO: 0.18 K/UL — SIGNIFICANT CHANGE UP (ref 0–0.5)
EOSINOPHIL NFR BLD AUTO: 2.3 % — SIGNIFICANT CHANGE UP (ref 0–6)
EPI CELLS # UR: SIGNIFICANT CHANGE UP
GAS PNL BLDA: SIGNIFICANT CHANGE UP
GLUCOSE BLDC GLUCOMTR-MCNC: 106 MG/DL — HIGH (ref 70–99)
GLUCOSE BLDC GLUCOMTR-MCNC: 109 MG/DL — HIGH (ref 70–99)
GLUCOSE BLDC GLUCOMTR-MCNC: 114 MG/DL — HIGH (ref 70–99)
GLUCOSE SERPL-MCNC: 99 MG/DL — SIGNIFICANT CHANGE UP (ref 70–99)
GLUCOSE UR QL: NEGATIVE — SIGNIFICANT CHANGE UP
HCO3 BLDA-SCNC: 30 MMOL/L — HIGH (ref 21–28)
HCT VFR BLD CALC: 39.3 % — SIGNIFICANT CHANGE UP (ref 34.5–45)
HGB BLD-MCNC: 12.4 G/DL — SIGNIFICANT CHANGE UP (ref 11.5–15.5)
HOROWITZ INDEX BLDA+IHG-RTO: SIGNIFICANT CHANGE UP
IMM GRANULOCYTES NFR BLD AUTO: 0.9 % — SIGNIFICANT CHANGE UP (ref 0–0.9)
KETONES UR-MCNC: NEGATIVE — SIGNIFICANT CHANGE UP
LEUKOCYTE ESTERASE UR-ACNC: NEGATIVE — SIGNIFICANT CHANGE UP
LYMPHOCYTES # BLD AUTO: 1.61 K/UL — SIGNIFICANT CHANGE UP (ref 1–3.3)
LYMPHOCYTES # BLD AUTO: 20.4 % — SIGNIFICANT CHANGE UP (ref 13–44)
MCHC RBC-ENTMCNC: 29.7 PG — SIGNIFICANT CHANGE UP (ref 27–34)
MCHC RBC-ENTMCNC: 31.6 GM/DL — LOW (ref 32–36)
MCV RBC AUTO: 94.2 FL — SIGNIFICANT CHANGE UP (ref 80–100)
MONOCYTES # BLD AUTO: 0.91 K/UL — HIGH (ref 0–0.9)
MONOCYTES NFR BLD AUTO: 11.5 % — SIGNIFICANT CHANGE UP (ref 2–14)
NEUTROPHILS # BLD AUTO: 5.06 K/UL — SIGNIFICANT CHANGE UP (ref 1.8–7.4)
NEUTROPHILS NFR BLD AUTO: 64.1 % — SIGNIFICANT CHANGE UP (ref 43–77)
NITRITE UR-MCNC: POSITIVE
PCO2 BLDA: 41 MMHG — SIGNIFICANT CHANGE UP (ref 32–45)
PH BLDA: 7.47 — HIGH (ref 7.35–7.45)
PH UR: 5 — SIGNIFICANT CHANGE UP (ref 5–8)
PLATELET # BLD AUTO: 409 K/UL — HIGH (ref 150–400)
PO2 BLDA: 118 MMHG — HIGH (ref 83–108)
POTASSIUM SERPL-MCNC: 3.5 MMOL/L — SIGNIFICANT CHANGE UP (ref 3.5–5.3)
POTASSIUM SERPL-SCNC: 3.5 MMOL/L — SIGNIFICANT CHANGE UP (ref 3.5–5.3)
PROT SERPL-MCNC: 7 G/DL — SIGNIFICANT CHANGE UP (ref 6.6–8.7)
PROT UR-MCNC: NEGATIVE — SIGNIFICANT CHANGE UP
RBC # BLD: 4.17 M/UL — SIGNIFICANT CHANGE UP (ref 3.8–5.2)
RBC # FLD: 13.3 % — SIGNIFICANT CHANGE UP (ref 10.3–14.5)
RBC CASTS # UR COMP ASSIST: SIGNIFICANT CHANGE UP /HPF (ref 0–4)
SAO2 % BLDA: 99.3 % — HIGH (ref 94–98)
SODIUM SERPL-SCNC: 141 MMOL/L — SIGNIFICANT CHANGE UP (ref 135–145)
SP GR SPEC: 1.02 — SIGNIFICANT CHANGE UP (ref 1.01–1.02)
T4 FREE SERPL-MCNC: 1.7 NG/DL — SIGNIFICANT CHANGE UP (ref 0.9–1.8)
TRI-PHOS CRY UR QL COMP ASSIST: ABNORMAL
UROBILINOGEN FLD QL: NEGATIVE — SIGNIFICANT CHANGE UP
WBC # BLD: 7.89 K/UL — SIGNIFICANT CHANGE UP (ref 3.8–10.5)
WBC # FLD AUTO: 7.89 K/UL — SIGNIFICANT CHANGE UP (ref 3.8–10.5)
WBC UR QL: SIGNIFICANT CHANGE UP /HPF (ref 0–5)

## 2022-12-15 PROCEDURE — 99223 1ST HOSP IP/OBS HIGH 75: CPT

## 2022-12-15 PROCEDURE — 99221 1ST HOSP IP/OBS SF/LOW 40: CPT

## 2022-12-15 PROCEDURE — 71250 CT THORAX DX C-: CPT | Mod: 26

## 2022-12-15 PROCEDURE — 99232 SBSQ HOSP IP/OBS MODERATE 35: CPT

## 2022-12-15 RX ORDER — DEXTROSE 50 % IN WATER 50 %
12.5 SYRINGE (ML) INTRAVENOUS ONCE
Refills: 0 | Status: DISCONTINUED | OUTPATIENT
Start: 2022-12-15 | End: 2022-12-23

## 2022-12-15 RX ORDER — DEXTROSE 50 % IN WATER 50 %
25 SYRINGE (ML) INTRAVENOUS ONCE
Refills: 0 | Status: DISCONTINUED | OUTPATIENT
Start: 2022-12-15 | End: 2022-12-23

## 2022-12-15 RX ORDER — SPIRONOLACTONE 25 MG/1
25 TABLET, FILM COATED ORAL DAILY
Refills: 0 | Status: DISCONTINUED | OUTPATIENT
Start: 2022-12-15 | End: 2022-12-16

## 2022-12-15 RX ORDER — IPRATROPIUM/ALBUTEROL SULFATE 18-103MCG
3 AEROSOL WITH ADAPTER (GRAM) INHALATION EVERY 6 HOURS
Refills: 0 | Status: DISCONTINUED | OUTPATIENT
Start: 2022-12-15 | End: 2022-12-19

## 2022-12-15 RX ORDER — INSULIN LISPRO 100/ML
VIAL (ML) SUBCUTANEOUS
Refills: 0 | Status: DISCONTINUED | OUTPATIENT
Start: 2022-12-15 | End: 2022-12-23

## 2022-12-15 RX ORDER — GLUCAGON INJECTION, SOLUTION 0.5 MG/.1ML
1 INJECTION, SOLUTION SUBCUTANEOUS ONCE
Refills: 0 | Status: DISCONTINUED | OUTPATIENT
Start: 2022-12-15 | End: 2022-12-23

## 2022-12-15 RX ORDER — ZOLPIDEM TARTRATE 10 MG/1
5 TABLET ORAL AT BEDTIME
Refills: 0 | Status: DISCONTINUED | OUTPATIENT
Start: 2022-12-15 | End: 2022-12-16

## 2022-12-15 RX ORDER — DEXTROSE 50 % IN WATER 50 %
15 SYRINGE (ML) INTRAVENOUS ONCE
Refills: 0 | Status: DISCONTINUED | OUTPATIENT
Start: 2022-12-15 | End: 2022-12-23

## 2022-12-15 RX ORDER — POTASSIUM CHLORIDE 20 MEQ
40 PACKET (EA) ORAL EVERY 4 HOURS
Refills: 0 | Status: COMPLETED | OUTPATIENT
Start: 2022-12-15 | End: 2022-12-15

## 2022-12-15 RX ORDER — ACETAMINOPHEN 500 MG
975 TABLET ORAL EVERY 8 HOURS
Refills: 0 | Status: DISCONTINUED | OUTPATIENT
Start: 2022-12-15 | End: 2022-12-23

## 2022-12-15 RX ORDER — DAPAGLIFLOZIN 10 MG/1
10 TABLET, FILM COATED ORAL EVERY 24 HOURS
Refills: 0 | Status: DISCONTINUED | OUTPATIENT
Start: 2022-12-15 | End: 2022-12-19

## 2022-12-15 RX ADMIN — MONTELUKAST 10 MILLIGRAM(S): 4 TABLET, CHEWABLE ORAL at 22:10

## 2022-12-15 RX ADMIN — SPIRONOLACTONE 25 MILLIGRAM(S): 25 TABLET, FILM COATED ORAL at 17:49

## 2022-12-15 RX ADMIN — GABAPENTIN 800 MILLIGRAM(S): 400 CAPSULE ORAL at 22:10

## 2022-12-15 RX ADMIN — Medication 120 MILLIGRAM(S): at 05:40

## 2022-12-15 RX ADMIN — SODIUM CHLORIDE 3 MILLILITER(S): 9 INJECTION INTRAMUSCULAR; INTRAVENOUS; SUBCUTANEOUS at 14:32

## 2022-12-15 RX ADMIN — Medication 100 MILLIGRAM(S): at 05:40

## 2022-12-15 RX ADMIN — SODIUM CHLORIDE 3 MILLILITER(S): 9 INJECTION INTRAMUSCULAR; INTRAVENOUS; SUBCUTANEOUS at 05:32

## 2022-12-15 RX ADMIN — ZOLPIDEM TARTRATE 5 MILLIGRAM(S): 10 TABLET ORAL at 23:13

## 2022-12-15 RX ADMIN — DAPAGLIFLOZIN 10 MILLIGRAM(S): 10 TABLET, FILM COATED ORAL at 22:09

## 2022-12-15 RX ADMIN — Medication 40 MILLIGRAM(S): at 05:40

## 2022-12-15 RX ADMIN — Medication 40 MILLIGRAM(S): at 14:57

## 2022-12-15 RX ADMIN — EZETIMIBE 10 MILLIGRAM(S): 10 TABLET ORAL at 11:39

## 2022-12-15 RX ADMIN — Medication 120 MILLIGRAM(S): at 17:50

## 2022-12-15 RX ADMIN — Medication 40 MILLIEQUIVALENT(S): at 14:57

## 2022-12-15 RX ADMIN — GABAPENTIN 800 MILLIGRAM(S): 400 CAPSULE ORAL at 14:58

## 2022-12-15 RX ADMIN — SODIUM CHLORIDE 3 MILLILITER(S): 9 INJECTION INTRAMUSCULAR; INTRAVENOUS; SUBCUTANEOUS at 22:04

## 2022-12-15 RX ADMIN — ATORVASTATIN CALCIUM 80 MILLIGRAM(S): 80 TABLET, FILM COATED ORAL at 22:10

## 2022-12-15 RX ADMIN — GABAPENTIN 800 MILLIGRAM(S): 400 CAPSULE ORAL at 05:40

## 2022-12-15 RX ADMIN — Medication 40 MILLIEQUIVALENT(S): at 11:39

## 2022-12-15 NOTE — PROGRESS NOTE ADULT - PROBLEM SELECTOR PLAN 2
Currently rate controlled afib on telemetry.   Continue Toprol and Verapamil for HR control.  On coumadin at home for chemical anticoagulation.   Follow up AM INR. Last INR was supratherapeutic >4.

## 2022-12-15 NOTE — PROGRESS NOTE ADULT - ASSESSMENT
72 year old female former smoker with PMH of morbid obesity, Aortic Stenosis, HTN, HLD, type 2 DM (HA1c 6.3 on Metformin and Farxiga), LE cellulitis, bacteremia, recently admitted to [facility] 11/30-12/8 of ESBL E.Coli and Proteus UTI, lap band (since removed), chronic LE venous stasis with vascular ulcers (followed by Dr. Yoo), presented from home 12/14/22 with complaints of worsening JETT with light exertion and lower extremity edema with associated palpitations. Patient follows with Cardiologist Dr. Mayer and was advised to seek medical attention for recent outpatient TTE demonstrating severe aortic stenosis. Patient admitted to CT Surgery service under Dr. Hopper for TAVR evaluation. Repeat TTE on admission showing Moderate Aortic stenosis with an ADRIAN of 1.1.

## 2022-12-15 NOTE — PROGRESS NOTE ADULT - SUBJECTIVE AND OBJECTIVE BOX
Preoperative evaluation for Aortic stenosis    PAST MEDICAL & SURGICAL HISTORY:  Diabetes mellitus type II, controlled  Atrial fibrillation  Congestive heart failure  Dyspnea  Morbid obesity with BMI of 40.0-44.9, adult  Neuropathy  Peripheral venous insufficiency  Thyroid nodule  HTN (hypertension)  Cellulitis  History of esophagogastroduodenoscopy (EGD)  H/O colonoscopy  Other complications of gastric band procedure  S/P left knee arthroscopy  Status post medial meniscus repair    FAMILY HISTORY:  Family history of breast cancer in mother  Family history of diabetes mellitus in mother    Brief Hospital Course: 72 year old female former smoker with PMH of morbid obesity, Aortic Stenosis, HTN, HLD, type 2 DM (HA1c 6.3 on Metformin and Farxiga), LE cellulitis, bacteremia, recently admitted to [facility] - of ESBL E.Coli and Proteus UTI, lap band (since removed), chronic LE venous stasis with vascular ulcers (followed by Dr. Yoo), presented from home 22 with complaints of worsening JETT with light exertion and lower extremity edema with associated palpitations. Patient follows with Cardiologist Dr. Mayer and was advised to seek medical attention for recent outpatient TTE demonstrating severe aortic stenosis. Patient admitted to CT Surgery service under Dr. Hopper for TAVR evaluation. Repeat TTE on admission showing Moderate Aortic stenosis with an ADRIAN of 1.1.      Significant recent/past 24 hr events: No overnight events reported.    Subjective: Patient lying in bed in NAD. +Tolerating diet. +Passing BMs. +JETT. No pain elicited at this time. Denies fevers, chills, lightheadedness, dizziness, HA, CP, palpitations, SOB, cough, abdominal pain, N/V, diarrhea, numbness/tingling in extremities, or any other acute complaints. ROS negative x 10 systems except as noted above.    MEDICATIONS  (STANDING):  albuterol/ipratropium for Nebulization 3 milliLiter(s) Nebulizer every 6 hours  atorvastatin 80 milliGRAM(s) Oral at bedtime  dapagliflozin 10 milliGRAM(s) Oral every 24 hours  ezetimibe 10 milliGRAM(s) Oral daily  gabapentin 800 milliGRAM(s) Oral three times a day  insulin lispro (ADMELOG) corrective regimen sliding scale   SubCutaneous Before meals and at bedtime  metoprolol succinate  milliGRAM(s) Oral daily  montelukast 10 milliGRAM(s) Oral at bedtime  sodium chloride 0.9% lock flush 3 milliLiter(s) IV Push every 8 hours  spironolactone 25 milliGRAM(s) Oral daily  verapamil 120 milliGRAM(s) Oral two times a day    MEDICATIONS  (PRN):  acetaminophen     Tablet .. 975 milliGRAM(s) Oral every 8 hours PRN Mild Pain (1 - 3)  albuterol    90 MICROgram(s) HFA Inhaler 2 Puff(s) Inhalation every 6 hours PRN Shortness of Breath  zolpidem 5 milliGRAM(s) Oral at bedtime PRN Insomnia    Allergies:  IV DYE, IODINE CONTRAST (Unknown)  latex (Unknown)  penicillin (Hives; Urticaria)  pregabalin (Unknown)    Vitals   T(C): 36.7 (15 Dec 2022 22:04), Max: 37.1 (15 Dec 2022 17:42)  T(F): 98 (15 Dec 2022 22:04), Max: 98.8 (15 Dec 2022 17:42)  HR: 66 (15 Dec 2022 22:04) (66 - 92)  BP: 116/81 (15 Dec 2022 22:04) (104/64 - 132/66)  BP(mean): 3 (15 Dec 2022 05:33) (3 - 3)  RR: 18 (15 Dec 2022 22:04) (18 - 20)  SpO2: 99% (15 Dec 2022 22:04) (95% - 99%)  O2 Parameters below as of 15 Dec 2022 22:04  Patient On (Oxygen Delivery Method): room air    I&O's Detail    14 Dec 2022 07:01  -  15 Dec 2022 07:00  --------------------------------------------------------  IN:  Total IN: 0 mL    OUT:    Voided (mL): 999 mL  Total OUT: 999 mL    Total NET: -999 mL    Physical Exam  Neuro: A+O x 3, non-focal, speech clear and intact  HEENT:  NCAT, No conjuctival edema or icterus, no thrush.    Neck:  Supple, trachea midline  Pulm: CTA bilaterally, no accessory muscle use noted  CV: regular rate, irregularly irregular rhythm, +S1S2, +murmur  Abd: soft, NT, ND, + BS  Ext: CAPPS x 4, trace LE edema b/l with chronic LE skin changes/ hyperpigmentation from chronic venous insufficiency, no cyanosis, overall distal motor/neuro/circ intact  Skin: warm, dry, perfused    LABS                        12.4   7.89  )-----------( 409      ( 15 Dec 2022 06:34 )             39.3     12-15    141  |  100  |  14.8  ----------------------------<  99  3.5   |  26.0  |  0.58    Ca    9.0      15 Dec 2022 06:34    TPro  7.0  /  Alb  2.9<L>  /  TBili  0.6  /  DBili  x   /  AST  16  /  ALT  21  /  AlkPhos  63  12-15    PT/INR - ( 14 Dec 2022 21:30 )   PT: 48.3 sec;   INR: 4.10 ratio      PTT - ( 14 Dec 2022 21:30 )  PTT:47.7 sec    P2Y12 Plt Response Test (22 @ 21:30)    P2Y12 Plt Reactivity: 278    Urinalysis Basic - ( 15 Dec 2022 17:50 )  Color: Yellow / Appearance: Clear / S.020 / pH: x  Gluc: x / Ketone: Negative  / Bili: Negative / Urobili: Negative   Blood: x / Protein: Negative / Nitrite: Positive   Leuk Esterase: Negative / RBC: 0-2 /HPF / WBC 0-2 /HPF   Sq Epi: x / Non Sq Epi: Occasional / Bacteria: Few    POCT Blood Glucose.: 106 mg/dL (12-15-22 @ 22:03)  POCT Blood Glucose.: 114 mg/dL (12-15-22 @ 17:23)  POCT Blood Glucose.: 109 mg/dL (12-15-22 @ 11:59)    A1C with Estimated Average Glucose (22 @ 21:30)    A1C with Estimated Average Glucose Result: 6.3 %    Estimated Average Glucose: 134 mg/dL    Thyroid Stimulating Hormone, Serum (22 @ 21:30)    Thyroid Stimulating Hormone, Serum: 0.22 uIU/mL    Free Thyroxine, Serum (22 @ 21:30)    Free Thyroxine, Serum: 1.7 ng/dL    Prealbumin, Serum (22 @ 21:30)    Prealbumin, Serum: 14 mg/dL    Serum Pro-Brain Natriuretic Peptide (22 @ 21:30)    Serum Pro-Brain Natriuretic Peptide: 931    Last CXR:  < from: Xray Chest 1 View- PORTABLE-Urgent (22 @ 20:01) >  FINDINGS:  CATHETERS AND TUBES: None  PULMONARY: The visualized lungs are clear of airspace consolidations or pleural effusions. No pneumothorax.  HEART/VASCULAR: Heart size within normal limits allowing for magnification effect of AP projection.  BONES: Visualized osseous structures are intact.  IMPRESSION: No radiographic evidence of active chest disease.  < end of copied text >    TTE:  < from: TTE Echo Complete w/o Contrast w/ Doppler (22 @ 20:42) >  Summary:   1. Technically difficult study.   2. Left ventricular ejection fraction, by visual estimation, is 50 to 55%.   3. LV Ejection Fraction by Ramirez's Method with a biplane EF of 49 %.   4. Normal global left ventricular systolic function.   5. There is moderate left ventricular hypertrophy.   6. The mitral in-flow pattern reveals no discernable A-wave, therefore no comment on diastolic function can be made.   7. Moderately enlarged right ventricle.   8. Mild mitral valve regurgitation.   9. Mild aortic regurgitation.  10. Moderate aortic valve stenosis.  11. Estimated pulmonary artery systolic pressure is 41.4 mmHg assuming a right atrial pressure of 3 mmHg, which is consistent with mild pulmonary hypertension.  < end of copied text >

## 2022-12-15 NOTE — PHYSICAL THERAPY INITIAL EVALUATION ADULT - PERTINENT HX OF CURRENT PROBLEM, REHAB EVAL
presents from home with complaints of worsening JETT with light exertion and lower extremity edema with associated palpitation,

## 2022-12-15 NOTE — CONSULT NOTE ADULT - ASSESSMENT
HF Attending: Dr. Stevens     71 y/o female, former smoker, with HFpEF (LVEF 50-55%), aortic stenosis, atrial fibrillation, HTN, HLD, DM (controlled on Metformin), chronic PVD/prior vascular ulcers (followed by Dr. Yoo), morbid obesity/prior lap band (since removed).  Patient endorses progressive dyspnea since September/October. She had a recent prolonged hospitalization at  where she was treated for cellulitis and bacteremia. She was discharged home last Friday and presented to her primary cardiologist's office for f/u yesterday where she was noted to have significant SOB and was referred to [facility].  She has been treated with intermittent IV diuresis and notes significant improvement in LE edema. However, she continues to endorse orthopnea and was only able to walk ~10 feet with PT today due to SOB.    CTA negative for  PE on 11/29/22.  Labwork remarkable for serum pBNP 1023, INR 4. Negative trops.    TTE 12/14/22: LVEF 50-55%, LVIDd 5.11cm, moderate LVH, moderate RVE with low/normal RV function, mild AI, moderate AS, mild MR, moderate TR, PASP 41.4 mmHg (RAP 3 mmHg).   HF Attending: Dr. Stevens     71 y/o female, former smoker, with HFpEF (LVEF 50-55%), aortic stenosis, atrial fibrillation, HTN, HLD, DM (controlled on Metformin), chronic PVD/prior vascular ulcers (followed by Dr. Yoo), morbid obesity/prior lap band (since removed).  Patient endorses progressive dyspnea since September/October. She had a recent prolonged hospitalization at  where she was treated for cellulitis and bacteremia. She was discharged home last Friday and presented to her primary cardiologist's office for f/u yesterday where she was noted to have significant SOB and was referred to [facility].  She has been treated with intermittent IV diuresis and notes significant improvement in LE edema. However, she continues to endorse orthopnea and was only able to walk ~10 feet with PT today due to SOB.    CTA negative for  PE on 11/29/22.  Labwork remarkable for serum pBNP 1023, INR 4. Negative trops.    TTE 12/14/22: LVEF 50-55%, LVIDd 5.11cm, IVSd 1.2cm, mild LAE, severe AGUILA, moderate RVE with low/normal RV function, mild AI, moderate AS (MG 23, PV 3.02m/s), mild MR, moderate TR, PASP 41.4 mmHg (RAP 3 mmHg).

## 2022-12-15 NOTE — CONSULT NOTE ADULT - PROBLEM SELECTOR RECOMMENDATION 3
-Rate controlled  -Continue Verapamil (recently switched from Diltiazem due to cost)  -Warfarin on hold (INR 4) -Rate controlled  -Continue Verapamil (recently switched from Diltiazem due to cost)  -Warfarin on hold (INR 4)  - recommend EP to see patient for cardioversion consideration.

## 2022-12-15 NOTE — PROGRESS NOTE ADULT - PROBLEM SELECTOR PLAN 1
Patient with Severe AS on outpatient TTE with associated JETT.    Admitted for TAVR evaluation and chronic diastolic heart failure exacerbation.  Repeat TTE showing Moderate AS with an ADRIAN of 1.1.   Heart failure team following.   Diuretics held at this time per heart failure as patient intravascularly dry, with only trace edema.  Continue Toprol as tolerated by HR and BP.   Continue Aldactone and Farxiga for heart failure regimen.   Recommending Entresto as tolerated by SBP.   Patient unable to lie flat due to respiratory status.   Pulmonology following.   Follow up CT chest.   Plan for RHC/ LHC with ASHLEY and potential cardioversion once patient optimized from a respiratory standpoint.   Plan to be discussed / reviewed with CT Surgery attending / team during AM rounds.

## 2022-12-15 NOTE — PHYSICAL THERAPY INITIAL EVALUATION ADULT - ADDITIONAL COMMENTS
as per pt: resides in the private house with 3 steps (+) rail to enter, (+) chair lift to the second floor, ambulates with SAC vs RW as needed, denies hx of falls,  available to assist

## 2022-12-15 NOTE — CONSULT NOTE ADULT - PROBLEM SELECTOR RECOMMENDATION 9
-LVEF 50-55%, LVIDd 5.11, moderate RVE with low normal RV function  -Clinically appears euvolemic on exam. She reports good UOP and significant improvement in LE edema however still c/o SOB on very minimal exertion which seems to be out of proportion to CHF.  -Would stop IV lasix. Start Spironolactone 25mg daily and resume Farxiga 10mg daily.  -Continue Toprol and Verapamil.  -Would recommend R/LHC however unsure if patient can tolerate procedure.  -Pulmonary consult pending.

## 2022-12-15 NOTE — PROGRESS NOTE ADULT - SUBJECTIVE AND OBJECTIVE BOX
Patient is a 72y old  Female who presents with a chief complaint of SOB, Aortic Stenosis (15 Dec 2022 16:13)    HPI:  72F former smoker w/ hx of Afib on coumadin, HTN, HLD, DM, morbid obesity w/ prior lap band now removed, chronic LE venous stasis w/ recurrent cellulitis referred to ED by her cardiologist due to ongoing dyspnea with exertion with associated LE edema and was found to have severe aortic stenosis on Echo and now admitted for TAVR evaluation. Pt denies any prior formal pulmonary diagnosis. She was recently prescribed albuterol inhaler but did not note any improvement with use. Pt denies any associated fevers/chills. Denies any associated cough. Denies any chest pain. Denies any wheezing. Denies any change in LE edema. Reports she previously had a sleep study for ISIDRO and was told it was negative. Pt reports she is winded with a few feet walking and is unable to climb stairs. Pt was recently admitted to [facility] - with dyspnea and CT chest showed GGO. She was treated for heart failure exacerbation and was also found to have ESBL E coli and treated with meropenem.      PAST MEDICAL & SURGICAL HISTORY:  Diabetes mellitus type II, controlled  Atrial fibrillation  Congestive heart failure  Dyspnea  Morbid obesity with BMI of 40.0-44.9, adult  Neuropathy  Peripheral venous insufficiency  Thyroid nodule  HTN (hypertension)  Cellulitis  History of esophagogastroduodenoscopy (EGD)  H/O colonoscopy  Other complications of gastric band procedure  S/P left knee arthroscopy  Status post medial meniscus repair    Medications:  metoprolol succinate  milliGRAM(s) Oral daily  spironolactone 25 milliGRAM(s) Oral daily  verapamil 120 milliGRAM(s) Oral two times a day  albuterol    90 MICROgram(s) HFA Inhaler 2 Puff(s) Inhalation every 6 hours PRN  montelukast 10 milliGRAM(s) Oral at bedtime  acetaminophen     Tablet .. 975 milliGRAM(s) Oral every 8 hours PRN  gabapentin 800 milliGRAM(s) Oral three times a day  atorvastatin 80 milliGRAM(s) Oral at bedtime  dextrose 50% Injectable 25 Gram(s) IV Push once  dextrose 50% Injectable 12.5 Gram(s) IV Push once  dextrose 50% Injectable 25 Gram(s) IV Push once  dextrose Oral Gel 15 Gram(s) Oral once  ezetimibe 10 milliGRAM(s) Oral daily  glucagon  Injectable 1 milliGRAM(s) IntraMuscular once  insulin lispro (ADMELOG) corrective regimen sliding scale   SubCutaneous Before meals and at bedtime  sodium chloride 0.9% lock flush 3 milliLiter(s) IV Push every 8 hours    Allergies: latex, iv dye, penicillin, pregablin    Social: up to 2ppd x 25 years quit 28 years ago, denies EtOH abuse, denies illicit drug use, 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    FH: No hx of pulmonary disease     ICU Vital Signs Last 24 Hrs  T(C): 37.1 (15 Dec 2022 17:42), Max: 37.1 (15 Dec 2022 17:42)  T(F): 98.8 (15 Dec 2022 17:42), Max: 98.8 (15 Dec 2022 17:42)  HR: 79 (15 Dec 2022 17:42) (75 - 92)  BP: 104/64 (15 Dec 2022 17:42) (104/64 - 132/66)  BP(mean): 3 (15 Dec 2022 05:33) (3 - 3)  ABP: --  ABP(mean): --  RR: 18 (15 Dec 2022 17:42) (18 - 20)  SpO2: 95% (15 Dec 2022 17:42) (95% - 96%)    O2 Parameters below as of 15 Dec 2022 11:42  Patient On (Oxygen Delivery Method): room air      ABG - ( 15 Dec 2022 01:07 )  pH, Arterial: 7.470 pH, Blood: x     /  pCO2: 41    /  pO2: 118   / HCO3: 30    / Base Excess: 6.1   /  SaO2: 99.3        I&O's Detail    14 Dec 2022 07:01  -  15 Dec 2022 07:00  --------------------------------------------------------  IN:  Total IN: 0 mL    OUT:    Voided (mL): 999 mL  Total OUT: 999 mL    Total NET: -999 mL    LABS:                        12.4   7.89  )-----------( 409      ( 15 Dec 2022 06:34 )             39.3     12-15    141  |  100  |  14.8  ----------------------------<  99  3.5   |  26.0  |  0.58    Ca    9.0      15 Dec 2022 06:34    TPro  7.0  /  Alb  2.9<L>  /  TBili  0.6  /  DBili  x   /  AST  16  /  ALT  21  /  AlkPhos  63  12-15      CARDIAC MARKERS ( 14 Dec 2022 18:00 )  x     / <0.01 ng/mL / x     / x     / x          CAPILLARY BLOOD GLUCOSE      POCT Blood Glucose.: 114 mg/dL (15 Dec 2022 17:23)    PT/INR - ( 14 Dec 2022 21:30 )   PT: 48.3 sec;   INR: 4.10 ratio         PTT - ( 14 Dec 2022 21:30 )  PTT:47.7 sec  Urinalysis Basic - ( 15 Dec 2022 17:50 )    Color: Yellow / Appearance: Clear / S.020 / pH: x  Gluc: x / Ketone: Negative  / Bili: Negative / Urobili: Negative   Blood: x / Protein: Negative / Nitrite: Positive   Leuk Esterase: Negative / RBC: 0-2 /HPF / WBC 0-2 /HPF   Sq Epi: x / Non Sq Epi: Occasional / Bacteria: Few      CULTURES:  Rapid RVP Result: NotDetec ( @ 20:06)      Physical Examination:    General: Alert, in NAD     HEENT: NC/AT, anicteric, EOMI, MMM    PULM: diminished bilaterally with base crackles     NECK: Supple, trachea midline    CVS: Regular rate and rhythm, no murmurs, rubs, or gallops    ABD: Soft, nondistended, nontender, normoactive bowel sounds, obese    EXT: venous stasis changes of b/l LE, stable edema per pt     SKIN: Warm and well perfused    NEURO: Alert, oriented, interactive, nonfocal        RADIOLOGY:   CXR :    FINDINGS:  CATHETERS AND TUBES: None    PULMONARY: The visualized lungs are clear of airspace consolidations or pleural effusions.  No pneumothorax.    HEART/VASCULAR: Heart size within normal limits allowing for magnification effect of AP projection.    BONES: Visualized osseous structures are intact.    IMPRESSION: No radiographic evidence of active chest disease.    CT chest angio 11.29.22:    FINDINGS:    LUNGS AND AIRWAYS: Patent central airways. Patchy bilateral groundglass opacities.  PLEURA: No pleural effusion.  MEDIASTINUM AND RACHEL: No lymphadenopathy.  VESSELS: No pulmonary embolus. Atherosclerotic ossifications of the aorta and coronary arteries.  HEART: Cardiomegaly. No pericardial effusion.  CHEST WALL AND LOWER NECK: Multinodular thyroid again noted.  VISUALIZED UPPER ABDOMEN: Small hiatal hernia..  BONES: Degenerative changes.    IMPRESSION:  No pulmonary embolus.    Cardiomegaly and patchy ground glass opacities are nonspecific. Correlate for pulmonary edema versus infection.         Patient is a 72y old  Female who presents with a chief complaint of SOB, Aortic Stenosis (15 Dec 2022 16:13)    HPI:  72F former smoker w/ hx of Afib on coumadin, HTN, HLD, DM, morbid obesity w/ prior lap band now removed, chronic LE venous stasis w/ recurrent cellulitis referred to ED by her cardiologist due to ongoing dyspnea with exertion with associated LE edema and was found to have severe aortic stenosis on Echo and now admitted for TAVR evaluation. Pt denies any prior formal pulmonary diagnosis. She was recently prescribed albuterol inhaler but did not note any improvement with use. Pt denies any associated fevers/chills. Denies any associated cough. Denies any chest pain. Denies any wheezing. Denies any change in LE edema. Reports she previously had a sleep study for ISIDRO and was told it was negative. Pt reports she is winded with a few feet walking and is unable to climb stairs. Pt was recently admitted to [facility] - with dyspnea and CT chest showed GGO. She was treated for heart failure exacerbation and was also found to have ESBL E coli and treated with meropenem.      PAST MEDICAL & SURGICAL HISTORY:  Diabetes mellitus type II, controlled  Atrial fibrillation  Congestive heart failure  Dyspnea  Morbid obesity with BMI of 40.0-44.9, adult  Neuropathy  Peripheral venous insufficiency  Thyroid nodule  HTN (hypertension)  Cellulitis  History of esophagogastroduodenoscopy (EGD)  H/O colonoscopy  Other complications of gastric band procedure  S/P left knee arthroscopy  Status post medial meniscus repair    Medications:  metoprolol succinate  milliGRAM(s) Oral daily  spironolactone 25 milliGRAM(s) Oral daily  verapamil 120 milliGRAM(s) Oral two times a day  albuterol    90 MICROgram(s) HFA Inhaler 2 Puff(s) Inhalation every 6 hours PRN  montelukast 10 milliGRAM(s) Oral at bedtime  acetaminophen     Tablet .. 975 milliGRAM(s) Oral every 8 hours PRN  gabapentin 800 milliGRAM(s) Oral three times a day  atorvastatin 80 milliGRAM(s) Oral at bedtime  dextrose 50% Injectable 25 Gram(s) IV Push once  dextrose 50% Injectable 12.5 Gram(s) IV Push once  dextrose 50% Injectable 25 Gram(s) IV Push once  dextrose Oral Gel 15 Gram(s) Oral once  ezetimibe 10 milliGRAM(s) Oral daily  glucagon  Injectable 1 milliGRAM(s) IntraMuscular once  insulin lispro (ADMELOG) corrective regimen sliding scale   SubCutaneous Before meals and at bedtime  sodium chloride 0.9% lock flush 3 milliLiter(s) IV Push every 8 hours    Allergies: latex, iv dye, penicillin, pregablin    Social: up to 2ppd x 25 years quit 28 years ago, denies EtOH abuse, denies illicit drug use, 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    FH: No hx of pulmonary disease     ICU Vital Signs Last 24 Hrs  T(C): 37.1 (15 Dec 2022 17:42), Max: 37.1 (15 Dec 2022 17:42)  T(F): 98.8 (15 Dec 2022 17:42), Max: 98.8 (15 Dec 2022 17:42)  HR: 79 (15 Dec 2022 17:42) (75 - 92)  BP: 104/64 (15 Dec 2022 17:42) (104/64 - 132/66)  BP(mean): 3 (15 Dec 2022 05:33) (3 - 3)  ABP: --  ABP(mean): --  RR: 18 (15 Dec 2022 17:42) (18 - 20)  SpO2: 95% (15 Dec 2022 17:42) (95% - 96%)    O2 Parameters below as of 15 Dec 2022 11:42  Patient On (Oxygen Delivery Method): room air      ABG - ( 15 Dec 2022 01:07 )  pH, Arterial: 7.470 pH, Blood: x     /  pCO2: 41    /  pO2: 118   / HCO3: 30    / Base Excess: 6.1   /  SaO2: 99.3        I&O's Detail    14 Dec 2022 07:01  -  15 Dec 2022 07:00  --------------------------------------------------------  IN:  Total IN: 0 mL    OUT:    Voided (mL): 999 mL  Total OUT: 999 mL    Total NET: -999 mL    LABS:                        12.4   7.89  )-----------( 409      ( 15 Dec 2022 06:34 )             39.3     12-15    141  |  100  |  14.8  ----------------------------<  99  3.5   |  26.0  |  0.58    Ca    9.0      15 Dec 2022 06:34    TPro  7.0  /  Alb  2.9<L>  /  TBili  0.6  /  DBili  x   /  AST  16  /  ALT  21  /  AlkPhos  63  15      CARDIAC MARKERS ( 14 Dec 2022 18:00 )  x     / <0.01 ng/mL / x     / x     / x          CAPILLARY BLOOD GLUCOSE      POCT Blood Glucose.: 114 mg/dL (15 Dec 2022 17:23)    PT/INR - ( 14 Dec 2022 21:30 )   PT: 48.3 sec;   INR: 4.10 ratio         PTT - ( 14 Dec 2022 21:30 )  PTT:47.7 sec  Urinalysis Basic - ( 15 Dec 2022 17:50 )    Color: Yellow / Appearance: Clear / S.020 / pH: x  Gluc: x / Ketone: Negative  / Bili: Negative / Urobili: Negative   Blood: x / Protein: Negative / Nitrite: Positive   Leuk Esterase: Negative / RBC: 0-2 /HPF / WBC 0-2 /HPF   Sq Epi: x / Non Sq Epi: Occasional / Bacteria: Few      CULTURES:  Rapid RVP Result: NotDetec ( @ 20:06)      Physical Examination:    General: Alert, in NAD     HEENT: NC/AT, anicteric, EOMI, MMM    PULM: diminished bilaterally with base crackles     NECK: Supple, trachea midline    CVS: Regular rate and rhythm, no murmurs, rubs, or gallops    ABD: Soft, nondistended, nontender, normoactive bowel sounds, obese    EXT: venous stasis changes of b/l LE, stable edema per pt     SKIN: Warm and well perfused    NEURO: Alert, oriented, interactive, nonfocal    ROS: 10 point review of system negative except per HPI    RADIOLOGY:   CXR :    FINDINGS:  CATHETERS AND TUBES: None    PULMONARY: The visualized lungs are clear of airspace consolidations or pleural effusions.  No pneumothorax.    HEART/VASCULAR: Heart size within normal limits allowing for magnification effect of AP projection.    BONES: Visualized osseous structures are intact.    IMPRESSION: No radiographic evidence of active chest disease.    CT chest angio 22:    FINDINGS:    LUNGS AND AIRWAYS: Patent central airways. Patchy bilateral groundglass opacities.  PLEURA: No pleural effusion.  MEDIASTINUM AND RACHEL: No lymphadenopathy.  VESSELS: No pulmonary embolus. Atherosclerotic ossifications of the aorta and coronary arteries.  HEART: Cardiomegaly. No pericardial effusion.  CHEST WALL AND LOWER NECK: Multinodular thyroid again noted.  VISUALIZED UPPER ABDOMEN: Small hiatal hernia..  BONES: Degenerative changes.    IMPRESSION:  No pulmonary embolus.    Cardiomegaly and patchy ground glass opacities are nonspecific. Correlate for pulmonary edema versus infection.

## 2022-12-15 NOTE — CONSULT NOTE ADULT - SUBJECTIVE AND OBJECTIVE BOX
[facility] Advanced Heart Failure  Office: [address] [place] [id number]  Service Phone [phone]  Office Phone: [phone]/Fax: [phone]    HPI:  71 y/o female, former smoker, with HFpEF (LVEF 50-55%), aortic stenosis, atrial fibrillation, HTN, HLD, DM (controlled on Metformin), chronic PVD/prior vascular ulcers (followed by Dr. Yoo), morbid obesity/prior lap band (since removed).  Patient endorses progressive dyspnea since September/October. She had a recent prolonged hospitalization at  where she was treated for cellulitis and bacteremia. She was discharged home last Friday and presented to her primary cardiologist's office for f/u yesterday where she was noted to have significant SOB and was referred to [facility].  She has been treated with intermittent IV diuresis and notes significant improvement in LE edema. However, she continues to endorse orthopnea and was only able to walk ~10 feet with PT today due to SOB.    PAST MEDICAL & SURGICAL HISTORY:  Diabetes mellitus type II, controlled  Atrial fibrillation  Congestive heart failure  Dyspnea  Morbid obesity with BMI of 40.0-44.9, adult  Neuropathy  Peripheral venous insufficiency  Thyroid nodule  HTN (hypertension)  Cellulitis  History of esophagogastroduodenoscopy (EGD)  H/O colonoscopy  Other complications of gastric band procedure  S/P left knee arthroscopy  Status post medial meniscus repair    REVIEW OF SYSTEMS:  14 point ROS negative in detail apart from as documented in HPI.    MEDICATIONS  (STANDING):  atorvastatin 80 milliGRAM(s) Oral at bedtime  dextrose 50% Injectable 25 Gram(s) IV Push once  dextrose 50% Injectable 12.5 Gram(s) IV Push once  dextrose 50% Injectable 25 Gram(s) IV Push once  dextrose Oral Gel 15 Gram(s) Oral once  ezetimibe 10 milliGRAM(s) Oral daily  gabapentin 800 milliGRAM(s) Oral three times a day  glucagon  Injectable 1 milliGRAM(s) IntraMuscular once  insulin lispro (ADMELOG) corrective regimen sliding scale   SubCutaneous Before meals and at bedtime  metoprolol succinate  milliGRAM(s) Oral daily  montelukast 10 milliGRAM(s) Oral at bedtime  sodium chloride 0.9% lock flush 3 milliLiter(s) IV Push every 8 hours  spironolactone 25 milliGRAM(s) Oral daily  verapamil 120 milliGRAM(s) Oral two times a day    MEDICATIONS  (PRN):  acetaminophen     Tablet .. 975 milliGRAM(s) Oral every 8 hours PRN Mild Pain (1 - 3)  albuterol    90 MICROgram(s) HFA Inhaler 2 Puff(s) Inhalation every 6 hours PRN Shortness of Breath    Home Medications:  cholestyramine 4 g/5 g oral powder for reconstitution: 1 packet(s) orally 2 times a day   ezetimibe 10 mg oral tablet: 1 tab(s) orally once a day (in the evening)  gabapentin 400 mg oral capsule: 2 cap(s) orally 3 times a day metFORMIN 1000 mg oral tablet: 1 tab(s) orally 2 times a day   metoprolol succinate 50 mg oral tablet, extended release: 1 tab(s) orally once a day  montelukast 10 mg oral tablet: 1 tab(s) orally once a day (at bedtime) pravastatin 10 mg oral tablet: 1 tab(s) orally once a day (in the evening)  verapamil 120 mg oral tablet: 1 tab(s) orally 2 times a day warfarin 5 mg oral tablet: 1 tab(s) orally once a day (in the evening)  zolpidem 10 mg oral tablet: 1 tab(s) orally once a day (at bedtime)    Allergies  [This allergen will not trigger allergy alert] IV DYE, IODINE CONTRAST (Unknown)  latex (Unknown)  penicillin (Hives; Urticaria)  pregabalin (Unknown)    Social History:  Marital status:  50 years  Lives with  in house, 2 stairs to get inside, 2 stairs to get to chair lift  Occupation: works at [employer] at [employer]  Assistive Devices: had used cane previously, however with worsening SOB has been using walker most of the time    Tobacco use: former smoker X27 yrs (2-3PPD)   Alcohol use: denies   Illicit drug use: denies     Covid Vaccinated x 1 plus one booster    Family History:  Family history of MI/SCD in sister at age 54  Family history of aortic stenosis in two sisters  Family history of breast cancer in mother  Family history of diabetes mellitus in mother    Vital Signs Last 24 Hrs  T(C): 36.7, Max: 36.8 (12-15 @ 02:03)  HR: 92 (75 - 92)  BP: 108/66 (108/66 - 132/66)  BP(mean): 3 (3 - 3)  RR: 18 (18 - 20)  SpO2: 96% (95% - 98%)    Weight in k.9 (12-15-22)    I&O's Summary Last 24 Hrs  IN: 0 mL / OUT: 999 mL / NET: -999 mL    Tele: atrial fibrillation    Physical Exam:  General: Sitting up in chair, in no acute distress.  HEENT: EOMs intact.  Neck: Neck supple. JVP not elevated.  Chest: Clear to auscultation bilaterally.  CV: Irregularly irregular. Normal S1 and S2. Distant heart sounds.  PV: No edema noted. Venous stasis color changes bilaterally. Slightly cool lower extremities.  Abdomen: Soft, non-distended, non-tender  Skin: dry, no rash  Neurology: Alert and oriented times three. Non focal. Sensation intact.  Psych: Affect normal    Labs:    ( 12-15-22 @ 06:34 )               12.4   7.89  )--------( 409                  39.3     ( 12-15-22 @ 06:34 )     141  |  100  |  14.8  ---------------------<  99  3.5  |  26.0  |  0.58    Ca 9.0  Phos x   Mg x     ( 12-15-22 @ 06:34 )  TPro  7.0  /  Alb  2.9  /  TBili  0.6  /  DBili  x   /  AST  16  /  ALT  21  /  AlkPhos  63  ( 22 @ 21:30 )  TPro  6.9  /  Alb  2.9  /  TBili  0.4  /  DBili  x   /  AST  17  /  ALT  21  /  AlkPhos  64    PTT/PT/INR - ( 22 @ 21:30 )  PTT: 47.7 sec / PT: 48.3 sec / INR: 4.10 ratio    Serum Pro-Brain Natriuretic Peptide: 931 (22 @ 21:30)    ABG - ( 12-15-22 @ 01:07 )  pH: 7.470 / pCO2: 41    / pO2: 118   / HCO3: 30    / Base Excess: 6.1   / SaO2: 99.3

## 2022-12-16 DIAGNOSIS — L97.819 NON-PRESSURE CHRONIC ULCER OF OTHER PART OF RIGHT LOWER LEG WITH UNSPECIFIED SEVERITY: ICD-10-CM

## 2022-12-16 LAB
ANION GAP SERPL CALC-SCNC: 8 MMOL/L — SIGNIFICANT CHANGE UP (ref 5–17)
APPEARANCE UR: CLEAR — SIGNIFICANT CHANGE UP
BACTERIA # UR AUTO: ABNORMAL
BILIRUB UR-MCNC: NEGATIVE — SIGNIFICANT CHANGE UP
BUN SERPL-MCNC: 20.8 MG/DL — HIGH (ref 8–20)
CALCIUM SERPL-MCNC: 9.3 MG/DL — SIGNIFICANT CHANGE UP (ref 8.4–10.5)
CHLORIDE SERPL-SCNC: 101 MMOL/L — SIGNIFICANT CHANGE UP (ref 96–108)
CO2 SERPL-SCNC: 28 MMOL/L — SIGNIFICANT CHANGE UP (ref 22–29)
COLOR SPEC: YELLOW — SIGNIFICANT CHANGE UP
COMMENT - URINE: SIGNIFICANT CHANGE UP
CREAT SERPL-MCNC: 0.62 MG/DL — SIGNIFICANT CHANGE UP (ref 0.5–1.3)
DIFF PNL FLD: ABNORMAL
EGFR: 94 ML/MIN/1.73M2 — SIGNIFICANT CHANGE UP
EPI CELLS # UR: SIGNIFICANT CHANGE UP
GLUCOSE BLDC GLUCOMTR-MCNC: 105 MG/DL — HIGH (ref 70–99)
GLUCOSE BLDC GLUCOMTR-MCNC: 155 MG/DL — HIGH (ref 70–99)
GLUCOSE BLDC GLUCOMTR-MCNC: 179 MG/DL — HIGH (ref 70–99)
GLUCOSE BLDC GLUCOMTR-MCNC: 93 MG/DL — SIGNIFICANT CHANGE UP (ref 70–99)
GLUCOSE SERPL-MCNC: 96 MG/DL — SIGNIFICANT CHANGE UP (ref 70–99)
GLUCOSE UR QL: 1000 MG/DL
HCT VFR BLD CALC: 40.6 % — SIGNIFICANT CHANGE UP (ref 34.5–45)
HGB BLD-MCNC: 12.7 G/DL — SIGNIFICANT CHANGE UP (ref 11.5–15.5)
INR BLD: 2.55 RATIO — HIGH (ref 0.88–1.16)
KETONES UR-MCNC: ABNORMAL
LEUKOCYTE ESTERASE UR-ACNC: ABNORMAL
MAGNESIUM SERPL-MCNC: 1.5 MG/DL — LOW (ref 1.6–2.6)
MCHC RBC-ENTMCNC: 29.9 PG — SIGNIFICANT CHANGE UP (ref 27–34)
MCHC RBC-ENTMCNC: 31.3 GM/DL — LOW (ref 32–36)
MCV RBC AUTO: 95.5 FL — SIGNIFICANT CHANGE UP (ref 80–100)
MRSA PCR RESULT.: SIGNIFICANT CHANGE UP
NITRITE UR-MCNC: POSITIVE
PH UR: 5 — SIGNIFICANT CHANGE UP (ref 5–8)
PLATELET # BLD AUTO: 397 K/UL — SIGNIFICANT CHANGE UP (ref 150–400)
POTASSIUM SERPL-MCNC: 5 MMOL/L — SIGNIFICANT CHANGE UP (ref 3.5–5.3)
POTASSIUM SERPL-SCNC: 5 MMOL/L — SIGNIFICANT CHANGE UP (ref 3.5–5.3)
PROT UR-MCNC: NEGATIVE — SIGNIFICANT CHANGE UP
PROTHROM AB SERPL-ACNC: 29.9 SEC — HIGH (ref 10.5–13.4)
RBC # BLD: 4.25 M/UL — SIGNIFICANT CHANGE UP (ref 3.8–5.2)
RBC # FLD: 13.6 % — SIGNIFICANT CHANGE UP (ref 10.3–14.5)
RBC CASTS # UR COMP ASSIST: SIGNIFICANT CHANGE UP /HPF (ref 0–4)
S AUREUS DNA NOSE QL NAA+PROBE: SIGNIFICANT CHANGE UP
SODIUM SERPL-SCNC: 137 MMOL/L — SIGNIFICANT CHANGE UP (ref 135–145)
SP GR SPEC: 1.02 — SIGNIFICANT CHANGE UP (ref 1.01–1.02)
UROBILINOGEN FLD QL: NEGATIVE — SIGNIFICANT CHANGE UP
WBC # BLD: 11.04 K/UL — HIGH (ref 3.8–10.5)
WBC # FLD AUTO: 11.04 K/UL — HIGH (ref 3.8–10.5)
WBC UR QL: ABNORMAL /HPF (ref 0–5)

## 2022-12-16 PROCEDURE — 99233 SBSQ HOSP IP/OBS HIGH 50: CPT

## 2022-12-16 PROCEDURE — 71045 X-RAY EXAM CHEST 1 VIEW: CPT | Mod: 26

## 2022-12-16 PROCEDURE — 99232 SBSQ HOSP IP/OBS MODERATE 35: CPT

## 2022-12-16 PROCEDURE — 99231 SBSQ HOSP IP/OBS SF/LOW 25: CPT

## 2022-12-16 RX ORDER — AMIODARONE HYDROCHLORIDE 400 MG/1
150 TABLET ORAL ONCE
Refills: 0 | Status: COMPLETED | OUTPATIENT
Start: 2022-12-16 | End: 2022-12-16

## 2022-12-16 RX ORDER — MAGNESIUM SULFATE 500 MG/ML
2 VIAL (ML) INJECTION
Refills: 0 | Status: COMPLETED | OUTPATIENT
Start: 2022-12-16 | End: 2022-12-16

## 2022-12-16 RX ORDER — ZOLPIDEM TARTRATE 10 MG/1
5 TABLET ORAL AT BEDTIME
Refills: 0 | Status: DISCONTINUED | OUTPATIENT
Start: 2022-12-16 | End: 2022-12-20

## 2022-12-16 RX ORDER — AMIODARONE HYDROCHLORIDE 400 MG/1
1 TABLET ORAL
Qty: 900 | Refills: 0 | Status: DISCONTINUED | OUTPATIENT
Start: 2022-12-16 | End: 2022-12-17

## 2022-12-16 RX ORDER — AMIODARONE HYDROCHLORIDE 400 MG/1
0.5 TABLET ORAL
Qty: 900 | Refills: 0 | Status: DISCONTINUED | OUTPATIENT
Start: 2022-12-16 | End: 2022-12-17

## 2022-12-16 RX ORDER — ZOLPIDEM TARTRATE 10 MG/1
5 TABLET ORAL AT BEDTIME
Refills: 0 | Status: DISCONTINUED | OUTPATIENT
Start: 2022-12-16 | End: 2022-12-23

## 2022-12-16 RX ADMIN — Medication 120 MILLIGRAM(S): at 06:05

## 2022-12-16 RX ADMIN — ATORVASTATIN CALCIUM 80 MILLIGRAM(S): 80 TABLET, FILM COATED ORAL at 21:23

## 2022-12-16 RX ADMIN — DAPAGLIFLOZIN 10 MILLIGRAM(S): 10 TABLET, FILM COATED ORAL at 21:24

## 2022-12-16 RX ADMIN — SODIUM CHLORIDE 3 MILLILITER(S): 9 INJECTION INTRAMUSCULAR; INTRAVENOUS; SUBCUTANEOUS at 14:00

## 2022-12-16 RX ADMIN — SODIUM CHLORIDE 3 MILLILITER(S): 9 INJECTION INTRAMUSCULAR; INTRAVENOUS; SUBCUTANEOUS at 21:14

## 2022-12-16 RX ADMIN — AMIODARONE HYDROCHLORIDE 618 MILLIGRAM(S): 400 TABLET ORAL at 16:47

## 2022-12-16 RX ADMIN — AMIODARONE HYDROCHLORIDE 16.7 MG/MIN: 400 TABLET ORAL at 23:00

## 2022-12-16 RX ADMIN — Medication 3 MILLILITER(S): at 02:36

## 2022-12-16 RX ADMIN — GABAPENTIN 800 MILLIGRAM(S): 400 CAPSULE ORAL at 13:11

## 2022-12-16 RX ADMIN — SODIUM CHLORIDE 3 MILLILITER(S): 9 INJECTION INTRAMUSCULAR; INTRAVENOUS; SUBCUTANEOUS at 06:09

## 2022-12-16 RX ADMIN — Medication 3 MILLILITER(S): at 08:55

## 2022-12-16 RX ADMIN — Medication 975 MILLIGRAM(S): at 18:53

## 2022-12-16 RX ADMIN — Medication 3 MILLILITER(S): at 15:03

## 2022-12-16 RX ADMIN — AMIODARONE HYDROCHLORIDE 33.3 MG/MIN: 400 TABLET ORAL at 17:42

## 2022-12-16 RX ADMIN — Medication 100 MILLIGRAM(S): at 06:05

## 2022-12-16 RX ADMIN — MONTELUKAST 10 MILLIGRAM(S): 4 TABLET, CHEWABLE ORAL at 21:23

## 2022-12-16 RX ADMIN — Medication 25 GRAM(S): at 17:31

## 2022-12-16 RX ADMIN — Medication 2: at 22:26

## 2022-12-16 RX ADMIN — Medication 2: at 17:50

## 2022-12-16 RX ADMIN — ZOLPIDEM TARTRATE 5 MILLIGRAM(S): 10 TABLET ORAL at 21:22

## 2022-12-16 RX ADMIN — Medication 3 MILLILITER(S): at 20:13

## 2022-12-16 RX ADMIN — Medication 25 GRAM(S): at 12:30

## 2022-12-16 RX ADMIN — EZETIMIBE 10 MILLIGRAM(S): 10 TABLET ORAL at 13:11

## 2022-12-16 RX ADMIN — GABAPENTIN 800 MILLIGRAM(S): 400 CAPSULE ORAL at 06:04

## 2022-12-16 RX ADMIN — SPIRONOLACTONE 25 MILLIGRAM(S): 25 TABLET, FILM COATED ORAL at 06:05

## 2022-12-16 RX ADMIN — Medication 120 MILLIGRAM(S): at 21:23

## 2022-12-16 RX ADMIN — GABAPENTIN 800 MILLIGRAM(S): 400 CAPSULE ORAL at 21:22

## 2022-12-16 NOTE — PROGRESS NOTE ADULT - PROBLEM SELECTOR PLAN 1
-LVEF 50-55%, LVIDd 5.11, moderate RVE with low normal RV function  -Clinically appears euvolemic on exam. She reports good UOP and significant improvement in LE edema however still c/o SOB on very minimal exertion which seems to be out of proportion to CHF.  -Continue Toprol, Varapamil, Spironolactone 25mg daily and resume Farxiga 10mg daily.  -Continue Toprol and Verapamil.  -Will plan for Right and Left heart catheterization on Monday. Start heparin gtt once INR is 2. Recommend holding Farxiga Monday am.  -Pulmonary consult appreciated. -LVEF 50-55%, LVIDd 5.11, moderate RVE with low normal RV function  -Clinically appears euvolemic on exam. She reports good UOP and significant improvement in LE edema however still c/o SOB with minimal exertion which seems to be out of proportion to CHF.  -Continue Toprol, Verapamil, Spironolactone 25mg daily and Farxiga 10mg daily.  -Will plan for Right and Left heart catheterization on Monday. Start heparin gtt once INR is 2. Recommend holding Farxiga Monday am.  -Pulmonary consult appreciated.

## 2022-12-16 NOTE — CONSULT NOTE ADULT - SUBJECTIVE AND OBJECTIVE BOX
[facility]  [facility]   Office: [address] [place] [address]  Telephone: [phone] Fax:[phone]      HPI:  73 y/o female, former smoker with PMH of HFpEF (LVEF 50-55%), aortic stenosis, paroxysmal atrial fibrillation, HTN, HLD, DM, chronic PVD/prior vascular ulcers (followed by wound care and Dr. Yoo), and morbid obesity/prior lap band (since removed) who presented from home with complaints of worsening JETT and lower extremity edema.  She reports progressive dyspnea x 2-3 months. She reports previously being able to work at [employer] for 10 hours per week, now she is barely able to walk a few steps.  She has had two recent hospitalizations for progressive dyspnea at .  She was discharged home last Friday. She follows with Dr. Mayer (cardiology) and was referred to Dr. Hopper for recent TTE demonstrating moderate to severe aortic stenosis. She is now admitted to Dr. Hopper service for TAVR eval.  Repeat TTE on admission revealed only moderate aortic stenosis with an ADRIAN of 1.1.  She has been treated with intermittent IV diuresis and notes significant improvement in LE edema. However, she continues to have orthopnea and was only able to walk ~10 feet with PT due to SOB. Of note, she was noted to be in AF on admission ECG. EP consulted.     Plan for ASHLEY to further evaluate aortic valve    EKGs:2022 AF, rate controlled, LAFB           2019: AF, rate controlled  TELE:   TTE 2022:  Summary:   1. Technically difficult study.   2. Left ventricular ejection fraction, by visual estimation, is 50 to   55%.   3. LV Ejection Fraction by Ramirez's Method with a biplane EF of 49 %.   4. Normal global left ventricular systolic function.   5. There is moderate left ventricular hypertrophy.   6. The mitral in-flow pattern reveals no discernable A-wave, therefore   no comment on diastolic function can be made.   7. Moderately enlarged right ventricle.   8. Mild mitral valve regurgitation.   9. Mild aortic regurgitation.  10. Moderate aortic valve stenosis.  11. Estimated pulmonary artery systolic pressure is 41.4 mmHg assuming a   right atrial pressure of 3 mmHg, which is consistent with mild pulmonary   hypertension.        PAST MEDICAL & SURGICAL HISTORY:  Diabetes mellitus type II, controlled  Atrial fibrillation  Congestive heart failure  Morbid obesity with BMI of 40.0-44.9, adult  Neuropathy  Peripheral venous insufficiency  Thyroid nodule  HTN (hypertension)  Cellulitis  History of esophagogastroduodenoscopy (EGD)  H/O colonoscopy  Other complications of gastric band procedure  S/P left knee arthroscopy  Status post medial meniscus repair      REVIEW OF SYSTEMS:    CONSTITUTIONAL: No fever, weight loss, or fatigue  EYES: No visual disturbances  NECK: No pain or stiffness  RESPIRATORY: No cough, wheezing, chills or hemoptysis; No shortness of breath  CARDIOVASCULAR: see HPI  GASTROINTESTINAL: No abdominal or epigastric pain. No nausea, vomiting, or hematemesis; No diarrhea or constipation. No melena or hematochezia.  NEUROLOGICAL: No headaches, memory loss, loss of strength, numbness, or tremors  SKIN: No itching, burning, rashes, or lesions   LYMPH NODES: No enlarged glands  ENDOCRINE: No heat or cold intolerance; No hair loss  PSYCHIATRIC: No depression, anxiety, mood swings, or difficulty sleeping  HEME/LYMPH: No easy bruising, or bleeding gums      MEDICATIONS  (STANDING):  albuterol/ipratropium for Nebulization 3 milliLiter(s) Nebulizer every 6 hours  atorvastatin 80 milliGRAM(s) Oral at bedtime  dapagliflozin 10 milliGRAM(s) Oral every 24 hours  dextrose 50% Injectable 25 Gram(s) IV Push once  dextrose 50% Injectable 12.5 Gram(s) IV Push once  dextrose 50% Injectable 25 Gram(s) IV Push once  dextrose Oral Gel 15 Gram(s) Oral once  ezetimibe 10 milliGRAM(s) Oral daily  gabapentin 800 milliGRAM(s) Oral three times a day  glucagon  Injectable 1 milliGRAM(s) IntraMuscular once  insulin lispro (ADMELOG) corrective regimen sliding scale   SubCutaneous Before meals and at bedtime  magnesium sulfate  IVPB 2 Gram(s) IV Intermittent every 2 hours  metoprolol succinate  milliGRAM(s) Oral daily  montelukast 10 milliGRAM(s) Oral at bedtime  sodium chloride 0.9% lock flush 3 milliLiter(s) IV Push every 8 hours  spironolactone 25 milliGRAM(s) Oral daily  verapamil 120 milliGRAM(s) Oral two times a day    MEDICATIONS  (PRN):  acetaminophen     Tablet .. 975 milliGRAM(s) Oral every 8 hours PRN Mild Pain (1 - 3)  albuterol    90 MICROgram(s) HFA Inhaler 2 Puff(s) Inhalation every 6 hours PRN Shortness of Breath  zolpidem 5 milliGRAM(s) Oral at bedtime PRN Insomnia  zolpidem 5 milliGRAM(s) Oral at bedtime PRN Insomnia      Allergies  IV DYE, IODINE CONTRAST (Unknown)  latex (Unknown)  penicillin (Hives; Urticaria)  pregabalin (Unknown)        SOCIAL HISTORY:    FAMILY HISTORY:  Family history of breast cancer in mother    Family history of diabetes mellitus in mother        Vital Signs Last 24 Hrs  T(C): 37.4 (16 Dec 2022 09:45), Max: 37.4 (16 Dec 2022 09:45)  T(F): 99.4 (16 Dec 2022 09:45), Max: 99.4 (16 Dec 2022 09:45)  HR: 74 (16 Dec 2022 09:45) (66 - 88)  BP: 157/69 (16 Dec 2022 09:45) (104/64 - 157/69)  RR: 17 (16 Dec 2022 09:45) (17 - 18)  SpO2: 96% (16 Dec 2022 09:45) (95% - 99%)    Parameters below as of 16 Dec 2022 09:45  Patient On (Oxygen Delivery Method): nasal cannula  O2 Flow (L/min): 2      Physical Exam:  Constitutional: AAOx3, NAD  Neck: supple, No JVD  Cardiovascular: +S1S2 RRR, no murmurs, rubs, gallops   Pulmonary: CTA b/l, unlabored, no wheezes, rales. rhonci  Abdomen: +BS, soft NTND  Extremities: no edema b/l, +distal pulses b/l  Neuro: non focal, speech clear, CAPPS x 4    LABS:                        12.7   11.04 )-----------( 397      ( 16 Dec 2022 06:47 )             40.6   12-    137  |  101  |  20.8<H>  ----------------------------<  96  5.0   |  28.0  |  0.62    Ca    9.3      16 Dec 2022 06:47  Mg     1.5     -    TPro  7.0  /  Alb  2.9<L>  /  TBili  0.6  /  DBili  x   /  AST  16  /  ALT  21  /  AlkPhos  63  12-15  LIVER FUNCTIONS - ( 15 Dec 2022 06:34 )  Alb: 2.9 g/dL / Pro: 7.0 g/dL / ALK PHOS: 63 U/L / ALT: 21 U/L / AST: 16 U/L / GGT: x           PT/INR - ( 16 Dec 2022 06:47 )   PT: 29.9 sec;   INR: 2.55 ratio         PTT - ( 14 Dec 2022 21:30 )  PTT:47.7 secCARDIAC MARKERS ( 14 Dec 2022 18:00 )  x     / <0.01 ng/mL / x     / x     / x          Urinalysis Basic - ( 15 Dec 2022 17:50 )    Color: Yellow / Appearance: Clear / S.020 / pH: x  Gluc: x / Ketone: Negative  / Bili: Negative / Urobili: Negative   Blood: x / Protein: Negative / Nitrite: Positive   Leuk Esterase: Negative / RBC: 0-2 /HPF / WBC 0-2 /HPF   Sq Epi: x / Non Sq Epi: Occasional / Bacteria: Few        RADIOLOGY & ADDITIONAL STUDIES:  TTE:  Summary:   1. Technically difficult study.   2. Left ventricular ejection fraction, by visual estimation, is 50 to   55%.   3. LV Ejection Fraction by Ramirez's Method with a biplane EF of 49 %.   4. Normal global left ventricular systolic function.   5. There is moderate left ventricular hypertrophy.   6. The mitral in-flow pattern reveals no discernable A-wave, therefore   no comment on diastolic function can be made.   7. Moderately enlarged right ventricle.   8. Mild mitral valve regurgitation.   9. Mild aortic regurgitation.  10. Moderate aortic valve stenosis.  11. Estimated pulmonary artery systolic pressure is 41.4 mmHg assuming a   right atrial pressure of 3 mmHg, which is consistent with mild pulmonary   hypertension.   [facility]  [facility]   Office: [address] [place] [address]  Telephone: [phone] Fax:[phone]      HPI:  71 y/o female, former smoker with PMH of HFpEF (LVEF 50-55%), progressive aortic stenosis, persistent atrial fibrillation (s/p prior DCCV x 2; both unsuccessful- last ~3 years ago), HTN, HLD, DM, chronic PVD/prior vascular ulcers (followed by wound care and Dr. Yoo), and morbid obesity/prior lap band (since removed) who was sent in from Dr. George's office (Cardiology) for worsening JETT.  She reports progressive dyspnea x 2-3 months with LE edema and intermittent palpitations. She reports previously being able to work at [employer] for 10 hours per week, now she is barely able to walk a few steps.  A recent TTE revealed worsening aortic stenosis and was instructed to come to [facility] for TAVR evaluation with Dr. Hopper.  Of note, she does reports a history of atrial fibrillation for at least 5 years. She has been maintained on coumadin because she reports DOACs are too expensive. She does reports some dizziness but denies presyncope or syncope. Denies any fevers, chills, cough, chest pain, wheezing, abdominal pain, N/V/D.  She has also had two recent hospitalizations for progressive dyspnea, LE cellulitis/bacteremia at .  Repeat TTE on admission revealed only moderate aortic stenosis with an ADRIAN of 1.1.  She has been treated with intermittent IV diuresis and notes significant improvement in LE edema. However, she continues to have orthopnea and JETT (only able to walk ~10 feet with PT).  CTA negative for PE on . Pulm consulted. EP consulted.       EKGs:2022 Fine AF, rate controlled, LAFB           2019: AF, rate controlled  TELE:   TTE 2022:  Summary:   1. Technically difficult study.   2. Left ventricular ejection fraction, by visual estimation, is 50 to   55%.   3. LV Ejection Fraction by Ramirez's Method with a biplane EF of 49 %.   4. Normal global left ventricular systolic function.   5. There is moderate left ventricular hypertrophy.   6. The mitral in-flow pattern reveals no discernable A-wave, therefore   no comment on diastolic function can be made.   7. Moderately enlarged right ventricle.   8. Mild mitral valve regurgitation.   9. Mild aortic regurgitation.  10. Moderate aortic valve stenosis.  11. Estimated pulmonary artery systolic pressure is 41.4 mmHg assuming a   right atrial pressure of 3 mmHg, which is consistent with mild pulmonary   hypertension.        PAST MEDICAL & SURGICAL HISTORY:  Diabetes mellitus type II, controlled  Atrial fibrillation  Congestive heart failure  Morbid obesity with BMI of 40.0-44.9, adult  Neuropathy  Peripheral venous insufficiency  Thyroid nodule  HTN (hypertension)  Cellulitis  History of esophagogastroduodenoscopy (EGD)  H/O colonoscopy  Other complications of gastric band procedure  S/P left knee arthroscopy  Status post medial meniscus repair      REVIEW OF SYSTEMS:    CONSTITUTIONAL: No fever, weight loss, or fatigue  EYES: No visual disturbances  NECK: No pain or stiffness  RESPIRATORY: No cough, wheezing, chills or hemoptysis; No shortness of breath  CARDIOVASCULAR: see HPI  GASTROINTESTINAL: No abdominal or epigastric pain. No nausea, vomiting, or hematemesis; No diarrhea or constipation. No melena or hematochezia.  NEUROLOGICAL: No headaches, memory loss, loss of strength, numbness, or tremors  SKIN: No itching, burning, rashes, or lesions   LYMPH NODES: No enlarged glands  ENDOCRINE: No heat or cold intolerance; No hair loss  PSYCHIATRIC: No depression, anxiety, mood swings, or difficulty sleeping  HEME/LYMPH: No easy bruising, or bleeding gums      MEDICATIONS  (STANDING):  albuterol/ipratropium for Nebulization 3 milliLiter(s) Nebulizer every 6 hours  atorvastatin 80 milliGRAM(s) Oral at bedtime  dapagliflozin 10 milliGRAM(s) Oral every 24 hours  dextrose 50% Injectable 25 Gram(s) IV Push once  dextrose 50% Injectable 12.5 Gram(s) IV Push once  dextrose 50% Injectable 25 Gram(s) IV Push once  dextrose Oral Gel 15 Gram(s) Oral once  ezetimibe 10 milliGRAM(s) Oral daily  gabapentin 800 milliGRAM(s) Oral three times a day  glucagon  Injectable 1 milliGRAM(s) IntraMuscular once  insulin lispro (ADMELOG) corrective regimen sliding scale   SubCutaneous Before meals and at bedtime  magnesium sulfate  IVPB 2 Gram(s) IV Intermittent every 2 hours  metoprolol succinate  milliGRAM(s) Oral daily  montelukast 10 milliGRAM(s) Oral at bedtime  sodium chloride 0.9% lock flush 3 milliLiter(s) IV Push every 8 hours  spironolactone 25 milliGRAM(s) Oral daily  verapamil 120 milliGRAM(s) Oral two times a day    MEDICATIONS  (PRN):  acetaminophen     Tablet .. 975 milliGRAM(s) Oral every 8 hours PRN Mild Pain (1 - 3)  albuterol    90 MICROgram(s) HFA Inhaler 2 Puff(s) Inhalation every 6 hours PRN Shortness of Breath  zolpidem 5 milliGRAM(s) Oral at bedtime PRN Insomnia  zolpidem 5 milliGRAM(s) Oral at bedtime PRN Insomnia      Allergies  IV DYE, IODINE CONTRAST (Unknown)  latex (Unknown)  penicillin (Hives; Urticaria)  pregabalin (Unknown)        SOCIAL HISTORY:  Lives with    Former smoker X 27 yrs (2-3PPD)   Denies ETOH  Denies drug use      FAMILY HISTORY:  Family history of breast cancer in mother    Family history of diabetes mellitus in mother        Vital Signs Last 24 Hrs  T(C): 37.4 (16 Dec 2022 09:45), Max: 37.4 (16 Dec 2022 09:45)  T(F): 99.4 (16 Dec 2022 09:45), Max: 99.4 (16 Dec 2022 09:45)  HR: 74 (16 Dec 2022 09:45) (66 - 88)  BP: 157/69 (16 Dec 2022 09:45) (104/64 - 157/69)  RR: 17 (16 Dec 2022 09:45) (17 - 18)  SpO2: 96% (16 Dec 2022 09:45) (95% - 99%)    Parameters below as of 16 Dec 2022 09:45  Patient On (Oxygen Delivery Method): nasal cannula  O2 Flow (L/min): 2      Physical Exam:  Constitutional: AAOx3, NAD  Neck: supple, No JVD  Cardiovascular: +S1S2, irregular, +SHAHAB  Pulmonary: CTA b/l, unlabored, no wheezes, rales. rhonci  Abdomen: Obese, soft NTND  Extremities: + venous stasis changes, + LE edema L>R  Neuro: non focal, speech clear, CAPPS x 4    LABS:                        12.7   11.04 )-----------( 397      ( 16 Dec 2022 06:47 )             40.6   12-16    137  |  101  |  20.8<H>  ----------------------------<  96  5.0   |  28.0  |  0.62    Ca    9.3      16 Dec 2022 06:47  Mg     1.5     12-16    TPro  7.0  /  Alb  2.9<L>  /  TBili  0.6  /  DBili  x   /  AST  16  /  ALT  21  /  AlkPhos  63  12-15  LIVER FUNCTIONS - ( 15 Dec 2022 06:34 )  Alb: 2.9 g/dL / Pro: 7.0 g/dL / ALK PHOS: 63 U/L / ALT: 21 U/L / AST: 16 U/L / GGT: x           PT/INR - ( 16 Dec 2022 06:47 )   PT: 29.9 sec;   INR: 2.55 ratio         PTT - ( 14 Dec 2022 21:30 )  PTT:47.7 secCARDIAC MARKERS ( 14 Dec 2022 18:00 )  x     / <0.01 ng/mL / x     / x     / x          Urinalysis Basic - ( 15 Dec 2022 17:50 )    Color: Yellow / Appearance: Clear / S.020 / pH: x  Gluc: x / Ketone: Negative  / Bili: Negative / Urobili: Negative   Blood: x / Protein: Negative / Nitrite: Positive   Leuk Esterase: Negative / RBC: 0-2 /HPF / WBC 0-2 /HPF   Sq Epi: x / Non Sq Epi: Occasional / Bacteria: Few        RADIOLOGY & ADDITIONAL STUDIES:  TTE:  Summary:   1. Technically difficult study.   2. Left ventricular ejection fraction, by visual estimation, is 50 to   55%.   3. LV Ejection Fraction by Ramirez's Method with a biplane EF of 49 %.   4. Normal global left ventricular systolic function.   5. There is moderate left ventricular hypertrophy.   6. The mitral in-flow pattern reveals no discernable A-wave, therefore   no comment on diastolic function can be made.   7. Moderately enlarged right ventricle.   8. Mild mitral valve regurgitation.   9. Mild aortic regurgitation.  10. Moderate aortic valve stenosis.  11. Estimated pulmonary artery systolic pressure is 41.4 mmHg assuming a   right atrial pressure of 3 mmHg, which is consistent with mild pulmonary   hypertension.    ACC: [id number] EXAM:  CT ANGIO CHEST PULM ART WAWIC                          PROCEDURE DATE:  2022          INTERPRETATION:  CLINICAL INFORMATION: Chest pain    COMPARISON: CTA chest 2019    CONTRAST/COMPLICATIONS:  IV Contrast: Omnipaque 350  90 cc administered   10 cc discarded  Oral Contrast: NONE  Complications: None reported at time of study completion    PROCEDURE:  CT Angiography of the Chest.  Sagittal and coronal reformats were performed as well as 3D (MIP)   reconstructions.    FINDINGS:    LUNGS AND AIRWAYS: Patent central airways.  Patchy bilateral groundglass   opacities.  PLEURA: No pleural effusion.  MEDIASTINUM AND RACHEL: No lymphadenopathy.  VESSELS: No pulmonary embolus. Atherosclerotic ossifications of the aorta   and coronary arteries.  HEART: Cardiomegaly. No pericardial effusion.  CHEST WALL AND LOWER NECK: Multinodular thyroid again noted.  VISUALIZED UPPER ABDOMEN: Small hiatal hernia..  BONES: Degenerative changes.    IMPRESSION:  No pulmonary embolus.    Cardiomegaly and patchy groundglass opacities are nonspecific. Correlate   for pulmonary edema versus infection.

## 2022-12-16 NOTE — PROGRESS NOTE ADULT - SUBJECTIVE AND OBJECTIVE BOX
Preoperative evaluation for Aortic stenosis    PAST MEDICAL & SURGICAL HISTORY:  Diabetes mellitus type II, controlled  Atrial fibrillation  Congestive heart failure  Dyspnea  Morbid obesity with BMI of 40.0-44.9, adult  Neuropathy  Peripheral venous insufficiency  Thyroid nodule  HTN (hypertension)  Cellulitis  History of esophagogastroduodenoscopy (EGD)  H/O colonoscopy  Other complications of gastric band procedure  S/P left knee arthroscopy  Status post medial meniscus repair    FAMILY HISTORY:  Family history of breast cancer in mother  Family history of diabetes mellitus in mother    Brief Hospital Course: 72 year old female former smoker with PMH of morbid obesity, Aortic Stenosis, HTN, HLD, type 2 DM (HA1c 6.3 on Metformin and Farxiga), LE cellulitis, bacteremia, recently admitted to [facility] - of ESBL E.Coli and Proteus UTI, lap band (since removed), chronic LE venous stasis with vascular ulcers (followed by Dr. Yoo), presented from home 22 with complaints of worsening JETT with light exertion and lower extremity edema with associated palpitations. Patient follows with Cardiologist Dr. Mayer and was advised to seek medical attention for recent outpatient TTE demonstrating severe aortic stenosis. Patient admitted to CT Surgery service under Dr. Hopper for TAVR evaluation. Repeat TTE on admission showing Moderate Aortic stenosis with an ADRIAN of 1.1.      Significant recent/past 24 hr events: No overnight events reported.    Subjective: Patient lying in bed in NAD. +Tolerating diet. +Passing BMs. +JETT. No pain elicited at this time. Denies fevers, chills, lightheadedness, dizziness, HA, CP, palpitations, SOB, cough, abdominal pain, N/V, diarrhea, numbness/tingling in extremities, or any other acute complaints. ROS negative x 10 systems except as noted above.    MEDICATIONS  (STANDING):  albuterol/ipratropium for Nebulization 3 milliLiter(s) Nebulizer every 6 hours  atorvastatin 80 milliGRAM(s) Oral at bedtime  dapagliflozin 10 milliGRAM(s) Oral every 24 hours  ezetimibe 10 milliGRAM(s) Oral daily  gabapentin 800 milliGRAM(s) Oral three times a day  insulin lispro (ADMELOG) corrective regimen sliding scale   SubCutaneous Before meals and at bedtime  metoprolol succinate  milliGRAM(s) Oral daily  montelukast 10 milliGRAM(s) Oral at bedtime  sodium chloride 0.9% lock flush 3 milliLiter(s) IV Push every 8 hours  spironolactone 25 milliGRAM(s) Oral daily  verapamil 120 milliGRAM(s) Oral two times a day    MEDICATIONS  (PRN):  acetaminophen     Tablet .. 975 milliGRAM(s) Oral every 8 hours PRN Mild Pain (1 - 3)  albuterol    90 MICROgram(s) HFA Inhaler 2 Puff(s) Inhalation every 6 hours PRN Shortness of Breath  zolpidem 5 milliGRAM(s) Oral at bedtime PRN Insomnia    Allergies:  IV DYE, IODINE CONTRAST (Unknown)  latex (Unknown)  penicillin (Hives; Urticaria)  pregabalin (Unknown)    Vitals   T(C): 36.7 (15 Dec 2022 22:04), Max: 37.1 (15 Dec 2022 17:42)  T(F): 98 (15 Dec 2022 22:04), Max: 98.8 (15 Dec 2022 17:42)  HR: 66 (15 Dec 2022 22:04) (66 - 92)  BP: 116/81 (15 Dec 2022 22:04) (104/64 - 132/66)  BP(mean): 3 (15 Dec 2022 05:33) (3 - 3)  RR: 18 (15 Dec 2022 22:04) (18 - 20)  SpO2: 99% (15 Dec 2022 22:04) (95% - 99%)  O2 Parameters below as of 15 Dec 2022 22:04  Patient On (Oxygen Delivery Method): room air    I&O's Detail    14 Dec 2022 07:01  -  15 Dec 2022 07:00  --------------------------------------------------------  IN:  Total IN: 0 mL    OUT:    Voided (mL): 999 mL  Total OUT: 999 mL    Total NET: -999 mL    Physical Exam  Neuro: A+O x 3, non-focal, speech clear and intact  HEENT:  NCAT, No conjuctival edema or icterus, no thrush.    Neck:  Supple, trachea midline  Pulm: CTA bilaterally, no accessory muscle use noted  CV: regular rate, irregularly irregular rhythm, +S1S2, +murmur  Abd: soft, NT, ND, + BS  Ext: CAPPS x 4, trace LE edema b/l with chronic LE skin changes/ hyperpigmentation from chronic venous insufficiency, no cyanosis, overall distal motor/neuro/circ intact  Skin: warm, dry, perfused    LABS                        12.4   7.89  )-----------( 409      ( 15 Dec 2022 06:34 )             39.3     12-15    141  |  100  |  14.8  ----------------------------<  99  3.5   |  26.0  |  0.58    Ca    9.0      15 Dec 2022 06:34    TPro  7.0  /  Alb  2.9<L>  /  TBili  0.6  /  DBili  x   /  AST  16  /  ALT  21  /  AlkPhos  63  12-15    PT/INR - ( 14 Dec 2022 21:30 )   PT: 48.3 sec;   INR: 4.10 ratio      PTT - ( 14 Dec 2022 21:30 )  PTT:47.7 sec    P2Y12 Plt Response Test (22 @ 21:30)    P2Y12 Plt Reactivity: 278    Urinalysis Basic - ( 15 Dec 2022 17:50 )  Color: Yellow / Appearance: Clear / S.020 / pH: x  Gluc: x / Ketone: Negative  / Bili: Negative / Urobili: Negative   Blood: x / Protein: Negative / Nitrite: Positive   Leuk Esterase: Negative / RBC: 0-2 /HPF / WBC 0-2 /HPF   Sq Epi: x / Non Sq Epi: Occasional / Bacteria: Few    POCT Blood Glucose.: 106 mg/dL (12-15-22 @ 22:03)  POCT Blood Glucose.: 114 mg/dL (12-15-22 @ 17:23)  POCT Blood Glucose.: 109 mg/dL (12-15-22 @ 11:59)    A1C with Estimated Average Glucose (22 @ 21:30)    A1C with Estimated Average Glucose Result: 6.3 %    Estimated Average Glucose: 134 mg/dL    Thyroid Stimulating Hormone, Serum (22 @ 21:30)    Thyroid Stimulating Hormone, Serum: 0.22 uIU/mL    Free Thyroxine, Serum (22 @ 21:30)    Free Thyroxine, Serum: 1.7 ng/dL    Prealbumin, Serum (22 @ 21:30)    Prealbumin, Serum: 14 mg/dL    Serum Pro-Brain Natriuretic Peptide (22 @ 21:30)    Serum Pro-Brain Natriuretic Peptide: 931    Last CXR:  < from: Xray Chest 1 View- PORTABLE-Urgent (22 @ 20:01) >  FINDINGS:  CATHETERS AND TUBES: None  PULMONARY: The visualized lungs are clear of airspace consolidations or pleural effusions. No pneumothorax.  HEART/VASCULAR: Heart size within normal limits allowing for magnification effect of AP projection.  BONES: Visualized osseous structures are intact.  IMPRESSION: No radiographic evidence of active chest disease.  < end of copied text >    TTE:  < from: TTE Echo Complete w/o Contrast w/ Doppler (22 @ 20:42) >  Summary:   1. Technically difficult study.   2. Left ventricular ejection fraction, by visual estimation, is 50 to 55%.   3. LV Ejection Fraction by Ramirez's Method with a biplane EF of 49 %.   4. Normal global left ventricular systolic function.   5. There is moderate left ventricular hypertrophy.   6. The mitral in-flow pattern reveals no discernable A-wave, therefore no comment on diastolic function can be made.   7. Moderately enlarged right ventricle.   8. Mild mitral valve regurgitation.   9. Mild aortic regurgitation.  10. Moderate aortic valve stenosis.  11. Estimated pulmonary artery systolic pressure is 41.4 mmHg assuming a right atrial pressure of 3 mmHg, which is consistent with mild pulmonary hypertension.  < end of copied text >

## 2022-12-16 NOTE — CONSULT NOTE ADULT - ASSESSMENT
INCOMPLETE NOTE; PATIENT NOT SEEN  73 y/o female, former smoker with PMH of HFpEF (LVEF 50-55%), progressive aortic stenosis, persistent atrial fibrillation (s/p prior DCCV x 2; both unsuccessful- last ~3 years ago), HTN, HLD, DM, chronic PVD/prior vascular ulcers (followed by wound care and Dr. Yoo), and morbid obesity/prior lap band (since removed) who was sent in from Dr. George's office (Cardiology) for worsening JETT.  She reports progressive dyspnea x 2-3 months with LE edema and intermittent palpitations. She reports previously being able to work at [employer] for 10 hours per week, now she is barely able to walk a few steps.  A recent TTE revealed worsening aortic stenosis and was instructed to come to [facility] for TAVR evaluation with Dr. Hopper.  Of note, she does reports a history of atrial fibrillation for at least 5 years. She has been maintained on coumadin because she reports DOACs are too expensive. She does reports some dizziness but denies presyncope or syncope. Denies any fevers, chills, cough, chest pain, wheezing, abdominal pain, N/V/D.  She has also had two recent hospitalizations for progressive dyspnea, LE cellulitis/bacteremia at .  Repeat TTE on admission revealed only moderate aortic stenosis with an ADRIAN of 1.1.  She has been treated with intermittent IV diuresis and notes significant improvement in LE edema. However, she continues to have orthopnea and JETT (only able to walk ~10 feet with PT).  CTA negative for PE on 11/29. Pulm consulted. EP consulted.     Recommendations:  - AF can be a contributing factor to patient symptoms. Can try rhythm control strategies with DCCV. Would recommend starting AAD with Amiodarone prior to cardioversion to increase chances of maintaining sinus rhythm.  - Recommend Amiodarone load with 150mg IV x 1 now, followed by an infusion at 1mg/min x 6 hours then 0.5mg/min x 18 hours. Following infusion, recommend starting oral Amiodarone at 400mg every 8 hours x 3 days followed by 400mg twice daily x 10 days. TFTs & LFTs should be monitor q 3-6 months while on amiodarone therapy. Anticipate <1 year of amiodarone therapy for this patient; however if > 1 year, pt will need annual fundoscopic exam and PFTs.  - R/LHC planned for Monday   - ASHLEY is planned for Monday to further evaluate aortic valve. If ASHLEY is negative for thrombus, would like to perform a DCCV to follow.   - If patient has significant symptomatic relief while in sinus rhythm, can consider outpatient ablation for long term maintenance.   - Follow DCCV patient would need to be on 30 days of uninterrupted anticoagulation.     - Full recommendations to follow   - Maintin Mg > 2 K >4  - [id number] = 5; age, sex, HF, HTN, DM. Pt currently on coumadin. Maintain therapeutic INRs 73 y/o female, former smoker with PMH of HFpEF (LVEF 50-55%), progressive aortic stenosis, persistent atrial fibrillation (s/p prior DCCV x 2; both unsuccessful- last ~3 years ago), HTN, HLD, DM, chronic PVD/prior vascular ulcers (followed by wound care and Dr. Yoo), and morbid obesity/prior lap band (since removed) who was sent in from Dr. George's office (Cardiology) for worsening JETT.  She reports progressive dyspnea x 2-3 months with LE edema and intermittent palpitations. She reports previously being able to work at [employer] for 10 hours per week, now she is barely able to walk a few steps.  A recent TTE revealed worsening aortic stenosis and was instructed to come to [facility] for TAVR evaluation with Dr. Hopper.  Of note, she does reports a history of atrial fibrillation for at least 5 years. She has been maintained on coumadin because she reports DOACs are too expensive. She does reports some dizziness but denies presyncope or syncope. Denies any fevers, chills, cough, chest pain, wheezing, abdominal pain, N/V/D.  She has also had two recent hospitalizations for progressive dyspnea, LE cellulitis/bacteremia at .  Repeat TTE on admission revealed only moderate aortic stenosis with an ADRIAN of 1.1.  She has been treated with intermittent IV diuresis and notes significant improvement in LE edema. However, she continues to have orthopnea and JETT (only able to walk ~10 feet with PT).  CTA negative for PE on 11/29. Pulm consulted. EP consulted.     Recommendations:  - AF can be a contributing factor to patient symptoms. Can try rhythm control strategies with DCCV. Would recommend starting AAD with Amiodarone prior to cardioversion to increase chances of maintaining sinus rhythm.  - Recommend Amiodarone load with 150mg IV x 1 now, followed by an infusion at 1mg/min x 6 hours then 0.5mg/min x 18 hours. Following infusion, recommend starting oral Amiodarone at 400mg every 8 hours x 3 days followed by 400mg twice daily x 10 days. TFTs & LFTs should be monitor q 3-6 months while on amiodarone therapy. Anticipate <1 year of amiodarone therapy for this patient; however if > 1 year, pt will need annual fundoscopic exam and PFTs.  - R/LHC planned for Monday   - ASHLEY is planned for Monday to further evaluate aortic valve. If ASHLEY is negative for thrombus, would like to perform a DCCV to follow.   - If patient has significant symptomatic relief while in sinus rhythm, can consider outpatient ablation for long term maintenance.   - Follow DCCV patient would need to be on 30 days of uninterrupted anticoagulation.   - Maintain K>4, Mg>2  - [id number] = 5; age, sex, HF, HTN, DM. Warfarin on hold (INR 2.55 from 4.1). Heparin gtt to be started once INR reaches 2.    - Full recommendations to follow      71 y/o female, former smoker with PMH of HFpEF (LVEF 50-55%), progressive aortic stenosis, persistent atrial fibrillation (s/p prior DCCV x 2; both unsuccessful- last ~3 years ago), HTN, HLD, DM, chronic PVD/prior vascular ulcers (followed by wound care and Dr. Yoo), and morbid obesity/prior lap band (since removed) who was sent in from Dr. George's office (Cardiology) for worsening JETT.  She reports progressive dyspnea x 2-3 months with LE edema and intermittent palpitations. She reports previously being able to work at [employer] for 10 hours per week, now she is barely able to walk a few steps.  A recent TTE revealed worsening aortic stenosis and was instructed to come to [facility] for TAVR evaluation with Dr. Hopper.  Of note, she does reports a history of atrial fibrillation for at least 5 years. She has been maintained on coumadin because she reports DOACs are too expensive. She does reports some dizziness but denies presyncope or syncope. Denies any fevers, chills, cough, chest pain, wheezing, abdominal pain, N/V/D.  She has also had two recent hospitalizations for progressive dyspnea, LE cellulitis/bacteremia at .  Repeat TTE on admission revealed only moderate aortic stenosis with an ADRIAN of 1.1.  She has been treated with intermittent IV diuresis and notes significant improvement in LE edema. However, she continues to have orthopnea and EJTT (only able to walk ~10 feet with PT).  CTA negative for PE on 11/29. Pulm consulted. EP consulted.     Recommendations:  - AF can be a contributing factor to patient symptoms. Can try rhythm control strategies with DCCV. Would recommend starting AAD with Amiodarone prior to cardioversion to increase chances of maintaining sinus rhythm.  - Recommend Amiodarone load with 150mg IV x 1 now, followed by an infusion at 1mg/min x 6 hours then 0.5mg/min x 18 hours. Following infusion, recommend starting oral Amiodarone at 400mg every 8 hours x 3 days followed by 400mg twice daily x 10 days. TFTs & LFTs should be monitor q 3-6 months while on amiodarone therapy. Anticipate <1 year of amiodarone therapy for this patient; however if > 1 year, pt will need annual fundoscopic exam and PFTs.  - R/LHC planned for Monday   - If R/LHC negative for pHTN or obstructive CAD, plan is for ASHLEY to further evaluate aortic valve. Can perform DCCV following ASHLEY is there is NO plan for TAVR during this admission. Following DCCV patient would need to be on 30 days of uninterrupted anticoagulation.   - If patient has significant symptomatic relief while in sinus rhythm, can consider outpatient ablation for long term maintenance.   - Maintain K>4, Mg>2  - [id number] = 5; age, sex, HF, HTN, DM. Warfarin on hold (INR 2.55 from 4.1). Heparin gtt to be started once INR reaches 2.    - Full recommendations to follow      73 y/o female, former smoker with PMH of HFpEF (LVEF 50-55%), progressive aortic stenosis, persistent atrial fibrillation (s/p prior DCCV x 2; both unsuccessful- last ~3 years ago), HTN, HLD, DM, chronic PVD/prior vascular ulcers (followed by wound care and Dr. Yoo), and morbid obesity/prior lap band (since removed) who was sent in from Dr. George's office (Cardiology) for worsening JETT.  Recent TTE revealed worsening aortic stenosis and was instructed to come to [facility] for TAVR evaluation with Dr. Hopper.  Repeat TTE on admission revealed only moderate aortic stenosis. SOB and orthopnea persists despite diuresis. CTA negative for PE on 11/29. Pulm and heart failure following.     Recommendations:  - AF can be a contributing factor to patient symptoms. Can try rhythm control strategies with DCCV. Would recommend starting AAD with Amiodarone prior to cardioversion to increase chances of maintaining sinus rhythm.  - Recommend Amiodarone load with 150mg IV x 1 now, followed by an infusion at 1mg/min x 6 hours then 0.5mg/min x 18 hours. Following infusion, recommend starting oral Amiodarone at 400mg every 8 hours x 3 days followed by 400mg twice daily x 10 days. TFTs & LFTs should be monitor q 3-6 months while on amiodarone therapy. Anticipate <1 year of amiodarone therapy for this patient; however if > 1 year, pt will need annual fundoscopic exam and PFTs.  - R/LHC planned for Monday   - If R/LHC negative for pHTN or obstructive CAD, plan is for ASHLEY to further evaluate aortic valve. Can perform DCCV following ASHLEY is there is NO plan for TAVR during this admission. Following DCCV patient would need to be on 30 days of uninterrupted anticoagulation.   - If patient has significant symptomatic relief while in sinus rhythm, can consider outpatient ablation for long term maintenance.   - Maintain K>4, Mg>2  - [id number] = 5; age, sex, HF, HTN, DM. Warfarin on hold (INR 2.55 from 4.1). Heparin gtt to be started once INR reaches 2.    - Full recommendations to follow

## 2022-12-16 NOTE — PROGRESS NOTE ADULT - NS ATTEND AMEND GEN_ALL_CORE FT
Patient was seen and examined at bedside with the advanced care provider.      Relatively euvolemic on exam today with JVP around 8cm.  She is having a robust urine output to farixga therapy.  No additional IV diuresis for today. K elevated to 5.0.  Monitor closely.  If K normalizes, can re-trial spironolactone over the weekend.  INR today 2.5.   Plan to change over to heparin drip once INR < 2.  Will have her undergo LHC/RHC on Monday.  Consider ASHLEY next week.  I reached out to the EP team to evaluate for possible afib associated symptoms and consideration for DCCV at the time of ASHLEY.

## 2022-12-16 NOTE — PROGRESS NOTE ADULT - SUBJECTIVE AND OBJECTIVE BOX
Patient is a 72y old  Female who presents with a chief complaint of SOB, Aortic Stenosis (15 Dec 2022 16:13)    HPI:  72F former smoker w/ hx of Afib on coumadin, HTN, HLD, DM, morbid obesity w/ prior lap band now removed, chronic LE venous stasis w/ recurrent cellulitis referred to ED by her cardiologist due to ongoing dyspnea with exertion with associated LE edema and was found to have severe aortic stenosis on Echo and now admitted for TAVR evaluation. Pt denies any prior formal pulmonary diagnosis. She was recently prescribed albuterol inhaler but did not note any improvement with use. Pt denies any associated fevers/chills. Denies any associated cough. Denies any chest pain. Denies any wheezing. Denies any change in LE edema. Reports she previously had a sleep study for ISIDRO and was told it was negative. Pt reports she is winded with a few feet walking and is unable to climb stairs. Pt was recently admitted to [facility] - with dyspnea and CT chest showed GGO. She was treated for heart failure exacerbation and was also found to have ESBL E coli and treated with meropenem. CT chest with few RUL patchy opacities.      Interval hx:   Pt      PAST MEDICAL & SURGICAL HISTORY:  Diabetes mellitus type II, controlled  Atrial fibrillation  Congestive heart failure  Dyspnea  Morbid obesity with BMI of 40.0-44.9, adult  Neuropathy  Peripheral venous insufficiency  Thyroid nodule  HTN (hypertension)  Cellulitis  History of esophagogastroduodenoscopy (EGD)  H/O colonoscopy  Other complications of gastric band procedure  S/P left knee arthroscopy  Status post medial meniscus repair    Medications:  metoprolol succinate  milliGRAM(s) Oral daily  spironolactone 25 milliGRAM(s) Oral daily  verapamil 120 milliGRAM(s) Oral two times a day  albuterol    90 MICROgram(s) HFA Inhaler 2 Puff(s) Inhalation every 6 hours PRN  montelukast 10 milliGRAM(s) Oral at bedtime  acetaminophen     Tablet .. 975 milliGRAM(s) Oral every 8 hours PRN  gabapentin 800 milliGRAM(s) Oral three times a day  atorvastatin 80 milliGRAM(s) Oral at bedtime  dextrose 50% Injectable 25 Gram(s) IV Push once  dextrose 50% Injectable 12.5 Gram(s) IV Push once  dextrose 50% Injectable 25 Gram(s) IV Push once  dextrose Oral Gel 15 Gram(s) Oral once  ezetimibe 10 milliGRAM(s) Oral daily  glucagon  Injectable 1 milliGRAM(s) IntraMuscular once  insulin lispro (ADMELOG) corrective regimen sliding scale   SubCutaneous Before meals and at bedtime  sodium chloride 0.9% lock flush 3 milliLiter(s) IV Push every 8 hours    Allergies: latex, iv dye, penicillin, pregablin    Social: up to 2ppd x 25 years quit 28 years ago, denies EtOH abuse, denies illicit drug use, 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    FH: No hx of pulmonary disease     ICU Vital Signs Last 24 Hrs  T(C): 37.1 (15 Dec 2022 17:42), Max: 37.1 (15 Dec 2022 17:42)  T(F): 98.8 (15 Dec 2022 17:42), Max: 98.8 (15 Dec 2022 17:42)  HR: 79 (15 Dec 2022 17:42) (75 - 92)  BP: 104/64 (15 Dec 2022 17:42) (104/64 - 132/66)  BP(mean): 3 (15 Dec 2022 05:33) (3 - 3)  ABP: --  ABP(mean): --  RR: 18 (15 Dec 2022 17:42) (18 - 20)  SpO2: 95% (15 Dec 2022 17:42) (95% - 96%)    O2 Parameters below as of 15 Dec 2022 11:42  Patient On (Oxygen Delivery Method): room air      ABG - ( 15 Dec 2022 01:07 )  pH, Arterial: 7.470 pH, Blood: x     /  pCO2: 41    /  pO2: 118   / HCO3: 30    / Base Excess: 6.1   /  SaO2: 99.3        I&O's Detail    14 Dec 2022 07:01  -  15 Dec 2022 07:00  --------------------------------------------------------  IN:  Total IN: 0 mL    OUT:    Voided (mL): 999 mL  Total OUT: 999 mL    Total NET: -999 mL    LABS:                        12.4   7.89  )-----------( 409      ( 15 Dec 2022 06:34 )             39.3     12-15    141  |  100  |  14.8  ----------------------------<  99  3.5   |  26.0  |  0.58    Ca    9.0      15 Dec 2022 06:34    TPro  7.0  /  Alb  2.9<L>  /  TBili  0.6  /  DBili  x   /  AST  16  /  ALT  21  /  AlkPhos  63  12-15      CARDIAC MARKERS ( 14 Dec 2022 18:00 )  x     / <0.01 ng/mL / x     / x     / x          CAPILLARY BLOOD GLUCOSE      POCT Blood Glucose.: 114 mg/dL (15 Dec 2022 17:23)    PT/INR - ( 14 Dec 2022 21:30 )   PT: 48.3 sec;   INR: 4.10 ratio         PTT - ( 14 Dec 2022 21:30 )  PTT:47.7 sec  Urinalysis Basic - ( 15 Dec 2022 17:50 )    Color: Yellow / Appearance: Clear / S.020 / pH: x  Gluc: x / Ketone: Negative  / Bili: Negative / Urobili: Negative   Blood: x / Protein: Negative / Nitrite: Positive   Leuk Esterase: Negative / RBC: 0-2 /HPF / WBC 0-2 /HPF   Sq Epi: x / Non Sq Epi: Occasional / Bacteria: Few      CULTURES:  Rapid RVP Result: NotDetec ( @ 20:06)      Physical Examination:    General: Alert, in NAD     HEENT: NC/AT, anicteric, EOMI, MMM    PULM: diminished bilaterally with base crackles     NECK: Supple, trachea midline    CVS: Regular rate and rhythm, no murmurs, rubs, or gallops    ABD: Soft, nondistended, nontender, normoactive bowel sounds, obese    EXT: venous stasis changes of b/l LE, stable edema per pt     SKIN: Warm and well perfused    NEURO: Alert, oriented, interactive, nonfocal    ROS: 10 point review of system negative except per HPI    RADIOLOGY:   CXR :    FINDINGS:  CATHETERS AND TUBES: None    PULMONARY: The visualized lungs are clear of airspace consolidations or pleural effusions.  No pneumothorax.    HEART/VASCULAR: Heart size within normal limits allowing for magnification effect of AP projection.    BONES: Visualized osseous structures are intact.    IMPRESSION: No radiographic evidence of active chest disease.    CT chest angio 22:    FINDINGS:    LUNGS AND AIRWAYS: Patent central airways. Patchy bilateral groundglass opacities.  PLEURA: No pleural effusion.  MEDIASTINUM AND RACHEL: No lymphadenopathy.  VESSELS: No pulmonary embolus. Atherosclerotic ossifications of the aorta and coronary arteries.  HEART: Cardiomegaly. No pericardial effusion.  CHEST WALL AND LOWER NECK: Multinodular thyroid again noted.  VISUALIZED UPPER ABDOMEN: Small hiatal hernia..  BONES: Degenerative changes.    IMPRESSION:  No pulmonary embolus.    Cardiomegaly and patchy ground glass opacities are nonspecific. Correlate for pulmonary edema versus infection.         Patient is a 72y old  Female who presents with a chief complaint of SOB, Aortic Stenosis (15 Dec 2022 16:13)    HPI:  72F former smoker w/ hx of Afib on coumadin, HTN, HLD, DM, morbid obesity w/ prior lap band now removed, chronic LE venous stasis w/ recurrent cellulitis referred to ED by her cardiologist due to ongoing dyspnea with exertion with associated LE edema and was found to have severe aortic stenosis on Echo and now admitted for TAVR evaluation. Pt denies any prior formal pulmonary diagnosis. She was recently prescribed albuterol inhaler but did not note any improvement with use. Pt denies any associated fevers/chills. Denies any associated cough. Denies any chest pain. Denies any wheezing. Denies any change in LE edema. Reports she previously had a sleep study for ISIDRO and was told it was negative. Pt reports she is winded with a few feet walking and is unable to climb stairs. Pt was recently admitted to [facility] 11/29-12/2 with dyspnea and CT chest showed GGO. She was treated for heart failure exacerbation and was also found to have ESBL E coli and treated with meropenem. CT chest with few RUL patchy opacities.      Interval hx:   Pt awaiting R/LHC/ASHLEY when able to lie flat. Pt reports she still feels dyspneic with exertion. Pt remains afebrile, and was intermittently off oxygen with sats in the 90s but intermittently still requiring 2L. Pt received DuoNebs this AM but notes no significant impact on breathing. She denies any significant cough. Denies any chest pain. CT chest with RUL patchy opacities but otherwise clear.        PAST MEDICAL & SURGICAL HISTORY:  Diabetes mellitus type II, controlled  Atrial fibrillation  Congestive heart failure  Dyspnea  Morbid obesity with BMI of 40.0-44.9, adult  Neuropathy  Peripheral venous insufficiency  Thyroid nodule  HTN (hypertension)  Cellulitis  History of esophagogastroduodenoscopy (EGD)  H/O colonoscopy  Other complications of gastric band procedure  S/P left knee arthroscopy  Status post medial meniscus repair    MEDICATIONS  (STANDING):  albuterol/ipratropium for Nebulization 3 milliLiter(s) Nebulizer every 6 hours  aMIOdarone Infusion 1 mG/Min (33.3 mL/Hr) IV Continuous <Continuous>  aMIOdarone Infusion 0.5 mG/Min (16.7 mL/Hr) IV Continuous <Continuous>  atorvastatin 80 milliGRAM(s) Oral at bedtime  dapagliflozin 10 milliGRAM(s) Oral every 24 hours  dextrose 50% Injectable 25 Gram(s) IV Push once  dextrose 50% Injectable 12.5 Gram(s) IV Push once  dextrose 50% Injectable 25 Gram(s) IV Push once  dextrose Oral Gel 15 Gram(s) Oral once  ezetimibe 10 milliGRAM(s) Oral daily  gabapentin 800 milliGRAM(s) Oral three times a day  glucagon  Injectable 1 milliGRAM(s) IntraMuscular once  insulin lispro (ADMELOG) corrective regimen sliding scale   SubCutaneous Before meals and at bedtime  metoprolol succinate  milliGRAM(s) Oral daily  montelukast 10 milliGRAM(s) Oral at bedtime  sodium chloride 0.9% lock flush 3 milliLiter(s) IV Push every 8 hours  verapamil 120 milliGRAM(s) Oral two times a day    MEDICATIONS  (PRN):  acetaminophen     Tablet .. 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    FH: No hx of pulmonary disease     Vital Signs Last 24 Hrs  T(C): 37.3 (16 Dec 2022 16:14), Max: 37.4 (16 Dec 2022 09:45)  T(F): 99.1 (16 Dec 2022 16:14), Max: 99.4 (16 Dec 2022 09:45)  HR: 112 (16 Dec 2022 16:14) (66 - 112)  BP: 143/86 (16 Dec 2022 16:14) (114/78 - 157/69)  BP(mean): --  RR: 18 (16 Dec 2022 16:14) (17 - 18)  SpO2: 95% (16 Dec 2022 16:14) (95% - 99%)    Parameters below as of 16 Dec 2022 16:14  Patient On (Oxygen Delivery Method): room air    ABG - ( 15 Dec 2022 01:07 )  pH, Arterial: 7.470 pH, Blood: x     /  pCO2: 41    /  pO2: 118   / HCO3: 30    / Base Excess: 6.1   /  SaO2: 99.3        I&O's Detail    16 Dec 2022 07:01  -  16 Dec 2022 20:14  --------------------------------------------------------  IN:    Oral Fluid: 240 mL  Total IN: 240 mL    OUT:    Voided (mL): 900 mL  Total OUT: 900 mL    Total NET: -660 mL    LABS:                         12.7   11.04 )-----------( 397      ( 16 Dec 2022 06:47 )             40.6     12-16    137  |  101  |  20.8<H>  ----------------------------<  96  5.0   |  28.0  |  0.62    Ca    9.3      16 Dec 2022 06:47  Mg     1.5     12-16    TPro  7.0  /  Alb  2.9<L>  /  TBili  0.6  /  DBili  x   /  AST  16  /  ALT  21  /  AlkPhos  63  12-15            Physical Examination:    General: Alert, in NAD     HEENT: NC/AT, anicteric, EOMI, MMM    PULM: diminished bilaterally with base crackles     NECK: Supple, trachea midline    CVS: Regular rate and rhythm, no murmurs, rubs, or gallops    ABD: Soft, nondistended, nontender, normoactive bowel sounds, obese    EXT: venous stasis changes of b/l LE, stable edema per pt     SKIN: Warm and well perfused    NEURO: Alert, oriented, interactive, nonfocal    ROS: 10 point review of system negative except per HPI    RADIOLOGY:   CXR 12/14:    FINDINGS:  CATHETERS AND TUBES: None    PULMONARY: The visualized lungs are clear of airspace consolidations or pleural effusions.  No pneumothorax.    HEART/VASCULAR: Heart size within normal limits allowing for magnification effect of AP projection.    BONES: Visualized osseous structures are intact.    IMPRESSION: No radiographic evidence of active chest disease.    CT chest angio 11.29.22:    FINDINGS:    LUNGS AND AIRWAYS: Patent central airways. Patchy bilateral groundglass opacities.  PLEURA: No pleural effusion.  MEDIASTINUM AND RACHEL: No lymphadenopathy.  VESSELS: No pulmonary embolus. Atherosclerotic ossifications of the aorta and coronary arteries.  HEART: Cardiomegaly. No pericardial effusion.  CHEST WALL AND LOWER NECK: Multinodular thyroid again noted.  VISUALIZED UPPER ABDOMEN: Small hiatal hernia..  BONES: Degenerative changes.    IMPRESSION:  No pulmonary embolus.    Cardiomegaly and patchy ground glass opacities are nonspecific. Correlate for pulmonary edema versus infection.    CT chest 12/15:    IMPRESSION:  A few right upper lobe peripheral patchy opacities. Lungs otherwise clear. Continued follow-up CT recommended.    VERTEBRAL BODY ANALYSIS: Low bone density as described above, consider further workup for osteoporosis.

## 2022-12-16 NOTE — PROGRESS NOTE ADULT - ASSESSMENT
72 year old female former smoker with PMH of morbid obesity, Aortic Stenosis, HTN, HLD, type 2 DM (HA1c 6.3 on Metformin and Farxiga), LE cellulitis, bacteremia, recently admitted to [facility] 11/30-12/8 of ESBL E.Coli and Proteus UTI, lap band (since removed), chronic LE venous stasis with vascular ulcers (followed by Dr. Yoo), presented from home 12/14/22 with complaints of worsening JETT with light exertion and lower extremity edema with associated palpitations. Patient follows with Cardiologist Dr. Mayer and was advised to seek medical attention for recent outpatient TTE demonstrating severe aortic stenosis. Patient admitted to CT Surgery service under Dr. Hopper for TAVR evaluation. Repeat TTE on admission showing Moderate Aortic stenosis with an ADRIAN of 1.1.

## 2022-12-16 NOTE — PROGRESS NOTE ADULT - ASSESSMENT
72F former smoker w/ hx of Afib on coumadin, HTN, HLD, DM, morbid obesity w/ prior lap band now removed, chronic LE venous stasis w/ recurrent cellulitis presenting with dyspnea on exertion/LE edema found to have severe AS and being evaluated for TAVR with pulmonary consulted to evaluate for possible pulmonary source     Dyspnea   likely multifactorial secondary to underlying obstruction/heart failure exacerbation in setting of AS/rule out parenchymal process   PFTs reviewed with mod-severe obstruction, possible restriction   pt does not appear to be in COPD exacerbation at this time, so will hold off on steroids   DuoNeb q6h   will repeat CT chest as prior demonstrated diffuse GGO but was possibly in the setting of heart failure   low suspicion for PE given pt was compliant with coumadin and INR supratherapeutic on admission   afebrile with no clinical signs of infection currently   incentive spirometry   wean FiO2 as tolerated for goal >90%  continue with diuresis   planned for possible cardiac cath tomorrow      72F former smoker w/ hx of Afib on coumadin, HTN, HLD, DM, morbid obesity w/ prior lap band now removed, chronic LE venous stasis w/ recurrent cellulitis presenting with dyspnea on exertion/LE edema found to have severe AS and being evaluated for TAVR with pulmonary consulted to evaluate for possible pulmonary source     Dyspnea   likely multifactorial secondary to underlying obstructive pulmonary disease/heart failure exacerbation in setting of AS  PFTs reviewed with mod-severe obstruction, possible restriction   pt does not appear to be in COPD exacerbation at this time, so will hold off on steroids   trialed on DuoNeb with no subjective improvement   CT chest with patch GGO in RUL which is unlikely the cause of her degree of dyspnea and is much improved from prior CT   low suspicion for PE given pt was compliant with coumadin and INR supratherapeutic on admission   afebrile with no clinical signs of infection currently   incentive spirometry   wean FiO2 as tolerated for goal >90%  diuresis/cardiac regimen per cardiology   awaiting L/RHC

## 2022-12-16 NOTE — PROGRESS NOTE ADULT - PROBLEM SELECTOR PLAN 2
-Only moderate on TTE (ADRIAN 1.1, PPG 36.5, DI 0.36)  -Further w/u as per CTS -Only moderate on TTE (ADRIAN 1.1, PPG 36.5, DI 0.36)  -Consider ASHLEY next week after R/LHC  -May also consider dobutamine stress if necessary

## 2022-12-16 NOTE — PROGRESS NOTE ADULT - ASSESSMENT
HF Attending: Dr. Stevens   Primary Cardiologist: Dr. Mayer ([place])    73 y/o female, former smoker, with HFpEF (LVEF 50-55%), aortic stenosis, atrial fibrillation, HTN, HLD, DM (controlled on Metformin), chronic PVD/prior vascular ulcers (followed by Dr. Yoo), morbid obesity/prior lap band (since removed).  Patient endorses progressive dyspnea since September/October. She had a recent prolonged hospitalization at  where she was treated for cellulitis and bacteremia. She was discharged home last Friday and presented to her primary cardiologist's office for f/u yesterday where she was noted to have significant SOB and was referred to [facility].  She has been treated with intermittent IV diuresis and notes significant improvement in LE edema. However, she continues to endorse orthopnea and was only able to walk ~10 feet with PT today due to SOB.    CTA negative for  PE on 11/29/22.  Labwork remarkable for serum pBNP 1023, INR 4. Negative trops.    TTE 12/14/22: LVEF 50-55%, LVIDd 5.11cm, IVSd 1.2cm, mild LAE, severe AGUILA, moderate RVE with low/normal RV function, mild AI, moderate AS (MG 23, PV 3.02m/s), mild MR, moderate TR, PASP 41.4 mmHg (RAP 3 mmHg).

## 2022-12-16 NOTE — PROGRESS NOTE ADULT - SUBJECTIVE AND OBJECTIVE BOX
NOTE INCOMPLETE    [facility] Advanced Heart Failure  Office: [address] [place] [id number]  Service Phone [phone]  Office Phone: [phone]/Fax: [phone]    Subjective/Objective: NAEO. Patient denies CP, palpitations, SOB at rest, dizziness.     Medications:  acetaminophen     Tablet .. 975 milliGRAM(s) Oral every 8 hours PRN  albuterol    90 MICROgram(s) HFA Inhaler 2 Puff(s) Inhalation every 6 hours PRN  albuterol/ipratropium for Nebulization 3 milliLiter(s) Nebulizer every 6 hours  atorvastatin 80 milliGRAM(s) Oral at bedtime  dapagliflozin 10 milliGRAM(s) Oral every 24 hours  dextrose 50% Injectable 25 Gram(s) IV Push once  dextrose 50% Injectable 12.5 Gram(s) IV Push once  dextrose 50% Injectable 25 Gram(s) IV Push once  dextrose Oral Gel 15 Gram(s) Oral once  ezetimibe 10 milliGRAM(s) Oral daily  gabapentin 800 milliGRAM(s) Oral three times a day  glucagon  Injectable 1 milliGRAM(s) IntraMuscular once  insulin lispro (ADMELOG) corrective regimen sliding scale   SubCutaneous Before meals and at bedtime  magnesium sulfate  IVPB 2 Gram(s) IV Intermittent every 2 hours  metoprolol succinate  milliGRAM(s) Oral daily  montelukast 10 milliGRAM(s) Oral at bedtime  sodium chloride 0.9% lock flush 3 milliLiter(s) IV Push every 8 hours  spironolactone 25 milliGRAM(s) Oral daily  verapamil 120 milliGRAM(s) Oral two times a day  zolpidem 5 milliGRAM(s) Oral at bedtime PRN  zolpidem 5 milliGRAM(s) Oral at bedtime PRN    Vital Signs Last 24 Hours  T(C): 37.4 (22 @ 09:45), Max: 37.4 (22 @ 09:45)  HR: 74 (22 @ 09:45) (66 - 88)  BP: 157/69 (22 @ 09:45) (104/64 - 157/69)  RR: 17 (22 @ 09:45) (17 - 18)  SpO2: 96% (22 @ 09:45) (95% - 99%)    Weight in k ( @ 04:00)    I&O's Summary  16 Dec 2022 07:01  -  16 Dec 2022 14:13  --------------------------------------------------------  IN: 240 mL / OUT: 900 mL / NET: -660 mL    Tele: Atrial fibrillation    Physical Exam:  General: No distress.   HEENT: EOMs intact.  Neck: Neck supple. JVP ~7.  Chest: Clear to auscultation bilaterally  CV: Irregularly irregular. S1, S2. Distant heart sounds. Warm peripherally.  PV: No LE edema. Venous stasis discoloration noted B/L.  Abdomen: Soft, non-distended  Skin: warm, dry  Neurology: Alert and oriented times three. Sensation intact  Psych: Affect normal    Labs:                        12.7   11.04 )-----------( 397      ( 16 Dec 2022 06:47 )             40.6     12-    137  |  101  |  20.8<H>  ----------------------------<  96  5.0   |  28.0  |  0.62    Ca    9.3      16 Dec 2022 06:47  Mg     1.5     -    TPro  7.0  /  Alb  2.9<L>  /  TBili  0.6  /  DBili  x   /  AST  16  /  ALT  21  /  AlkPhos  63  12-15    PT/INR - ( 16 Dec 2022 06:47 )   PT: 29.9 sec;   INR: 2.55 ratio         PTT - ( 14 Dec 2022 21:30 )  PTT:47.7 sec  CARDIAC MARKERS ( 14 Dec 2022 18:00 )  x     / <0.01 ng/mL / x     / x     / x          Serum Pro-Brain Natriuretic Peptide: 931 pg/mL ( @ 21:30)  Serum Pro-Brain Natriuretic Peptide: 1023 pg/mL ( @ 18:00)               [facility] Advanced Heart Failure  Office: [address] [place] [address]  Service Phone [phone]  Office Phone: [phone]/Fax: [phone]    Subjective/Objective: NAEO. Patient denies CP, palpitations, SOB at rest, dizziness.     Medications:  acetaminophen     Tablet .. 975 milliGRAM(s) Oral every 8 hours PRN  albuterol    90 MICROgram(s) HFA Inhaler 2 Puff(s) Inhalation every 6 hours PRN  albuterol/ipratropium for Nebulization 3 milliLiter(s) Nebulizer every 6 hours  atorvastatin 80 milliGRAM(s) Oral at bedtime  dapagliflozin 10 milliGRAM(s) Oral every 24 hours  dextrose 50% Injectable 25 Gram(s) IV Push once  dextrose 50% Injectable 12.5 Gram(s) IV Push once  dextrose 50% Injectable 25 Gram(s) IV Push once  dextrose Oral Gel 15 Gram(s) Oral once  ezetimibe 10 milliGRAM(s) Oral daily  gabapentin 800 milliGRAM(s) Oral three times a day  glucagon  Injectable 1 milliGRAM(s) IntraMuscular once  insulin lispro (ADMELOG) corrective regimen sliding scale   SubCutaneous Before meals and at bedtime  magnesium sulfate  IVPB 2 Gram(s) IV Intermittent every 2 hours  metoprolol succinate  milliGRAM(s) Oral daily  montelukast 10 milliGRAM(s) Oral at bedtime  sodium chloride 0.9% lock flush 3 milliLiter(s) IV Push every 8 hours  spironolactone 25 milliGRAM(s) Oral daily  verapamil 120 milliGRAM(s) Oral two times a day  zolpidem 5 milliGRAM(s) Oral at bedtime PRN  zolpidem 5 milliGRAM(s) Oral at bedtime PRN    Vital Signs Last 24 Hours  T(C): 37.4 (22 @ 09:45), Max: 37.4 (22 @ 09:45)  HR: 74 (22 @ 09:45) (66 - 88)  BP: 157/69 (22 @ 09:45) (104/64 - 157/69)  RR: 17 (22 @ 09:45) (17 - 18)  SpO2: 96% (22 @ 09:45) (95% - 99%)    Weight in k ( @ 04:00)    I&O's Summary  16 Dec 2022 07:01  -  16 Dec 2022 14:13  --------------------------------------------------------  IN: 240 mL / OUT: 900 mL / NET: -660 mL    Tele: Atrial fibrillation    Physical Exam:  General: No distress.   HEENT: EOMs intact.  Neck: Neck supple. JVP ~7.  Chest: Clear to auscultation bilaterally  CV: Irregularly irregular. S1, S2. Distant heart sounds. Warm peripherally.  PV: No LE edema. Venous stasis discoloration noted B/L.  Abdomen: Soft, non-distended  Skin: warm, dry  Neurology: Alert and oriented times three. Sensation intact  Psych: Affect normal    Labs:                        12.7   11.04 )-----------( 397      ( 16 Dec 2022 06:47 )             40.6     12-    137  |  101  |  20.8<H>  ----------------------------<  96  5.0   |  28.0  |  0.62    Ca    9.3      16 Dec 2022 06:47  Mg     1.5     -    TPro  7.0  /  Alb  2.9<L>  /  TBili  0.6  /  DBili  x   /  AST  16  /  ALT  21  /  AlkPhos  63  12-15    PT/INR - ( 16 Dec 2022 06:47 )   PT: 29.9 sec;   INR: 2.55 ratio         PTT - ( 14 Dec 2022 21:30 )  PTT:47.7 sec  CARDIAC MARKERS ( 14 Dec 2022 18:00 )  x     / <0.01 ng/mL / x     / x     / x          Serum Pro-Brain Natriuretic Peptide: 931 pg/mL ( @ 21:30)  Serum Pro-Brain Natriuretic Peptide: 1023 pg/mL ( @ 18:00)

## 2022-12-16 NOTE — PROGRESS NOTE ADULT - PROBLEM SELECTOR PLAN 3
-Rate controlled  -Continue Verapamil (recently switched from Diltiazem due to cost)  -Warfarin on hold (INR 4)  - recommend EP to see patient for cardioversion consideration. -Continue Verapamil for now (recently switched from Diltiazem due to cost)  -Maintain K>4, Mg>2  -Warfarin on hold (INR 2.55 from 4.1). Please start heparin gtt once INR reaches 2.  -Recommend EP to see patient for rhythm control/cardioversion consideration.

## 2022-12-17 LAB
ANION GAP SERPL CALC-SCNC: 9 MMOL/L — SIGNIFICANT CHANGE UP (ref 5–17)
APTT BLD: 70.2 SEC — HIGH (ref 27.5–35.5)
BUN SERPL-MCNC: 16.6 MG/DL — SIGNIFICANT CHANGE UP (ref 8–20)
CALCIUM SERPL-MCNC: 8.6 MG/DL — SIGNIFICANT CHANGE UP (ref 8.4–10.5)
CHLORIDE SERPL-SCNC: 101 MMOL/L — SIGNIFICANT CHANGE UP (ref 96–108)
CO2 SERPL-SCNC: 27 MMOL/L — SIGNIFICANT CHANGE UP (ref 22–29)
CREAT SERPL-MCNC: 0.6 MG/DL — SIGNIFICANT CHANGE UP (ref 0.5–1.3)
EGFR: 95 ML/MIN/1.73M2 — SIGNIFICANT CHANGE UP
GLUCOSE BLDC GLUCOMTR-MCNC: 105 MG/DL — HIGH (ref 70–99)
GLUCOSE BLDC GLUCOMTR-MCNC: 126 MG/DL — HIGH (ref 70–99)
GLUCOSE BLDC GLUCOMTR-MCNC: 130 MG/DL — HIGH (ref 70–99)
GLUCOSE BLDC GLUCOMTR-MCNC: 329 MG/DL — HIGH (ref 70–99)
GLUCOSE SERPL-MCNC: 97 MG/DL — SIGNIFICANT CHANGE UP (ref 70–99)
HCT VFR BLD CALC: 36.7 % — SIGNIFICANT CHANGE UP (ref 34.5–45)
HCT VFR BLD CALC: 38.1 % — SIGNIFICANT CHANGE UP (ref 34.5–45)
HGB BLD-MCNC: 11.5 G/DL — SIGNIFICANT CHANGE UP (ref 11.5–15.5)
HGB BLD-MCNC: 12.1 G/DL — SIGNIFICANT CHANGE UP (ref 11.5–15.5)
INR BLD: 1.65 RATIO — HIGH (ref 0.88–1.16)
MAGNESIUM SERPL-MCNC: 1.9 MG/DL — SIGNIFICANT CHANGE UP (ref 1.6–2.6)
MCHC RBC-ENTMCNC: 29.8 PG — SIGNIFICANT CHANGE UP (ref 27–34)
MCHC RBC-ENTMCNC: 30 PG — SIGNIFICANT CHANGE UP (ref 27–34)
MCHC RBC-ENTMCNC: 31.3 GM/DL — LOW (ref 32–36)
MCHC RBC-ENTMCNC: 31.8 GM/DL — LOW (ref 32–36)
MCV RBC AUTO: 94.5 FL — SIGNIFICANT CHANGE UP (ref 80–100)
MCV RBC AUTO: 95.1 FL — SIGNIFICANT CHANGE UP (ref 80–100)
PLATELET # BLD AUTO: 372 K/UL — SIGNIFICANT CHANGE UP (ref 150–400)
PLATELET # BLD AUTO: 385 K/UL — SIGNIFICANT CHANGE UP (ref 150–400)
POTASSIUM SERPL-MCNC: 4.1 MMOL/L — SIGNIFICANT CHANGE UP (ref 3.5–5.3)
POTASSIUM SERPL-SCNC: 4.1 MMOL/L — SIGNIFICANT CHANGE UP (ref 3.5–5.3)
PROTHROM AB SERPL-ACNC: 19.2 SEC — HIGH (ref 10.5–13.4)
RBC # BLD: 3.86 M/UL — SIGNIFICANT CHANGE UP (ref 3.8–5.2)
RBC # BLD: 4.03 M/UL — SIGNIFICANT CHANGE UP (ref 3.8–5.2)
RBC # FLD: 13.6 % — SIGNIFICANT CHANGE UP (ref 10.3–14.5)
RBC # FLD: 13.7 % — SIGNIFICANT CHANGE UP (ref 10.3–14.5)
SODIUM SERPL-SCNC: 137 MMOL/L — SIGNIFICANT CHANGE UP (ref 135–145)
WBC # BLD: 9.38 K/UL — SIGNIFICANT CHANGE UP (ref 3.8–10.5)
WBC # BLD: 9.56 K/UL — SIGNIFICANT CHANGE UP (ref 3.8–10.5)
WBC # FLD AUTO: 9.38 K/UL — SIGNIFICANT CHANGE UP (ref 3.8–10.5)
WBC # FLD AUTO: 9.56 K/UL — SIGNIFICANT CHANGE UP (ref 3.8–10.5)

## 2022-12-17 PROCEDURE — 71045 X-RAY EXAM CHEST 1 VIEW: CPT | Mod: 26

## 2022-12-17 PROCEDURE — 99231 SBSQ HOSP IP/OBS SF/LOW 25: CPT

## 2022-12-17 PROCEDURE — 99232 SBSQ HOSP IP/OBS MODERATE 35: CPT

## 2022-12-17 RX ORDER — INFLUENZA VIRUS VACCINE 15; 15; 15; 15 UG/.5ML; UG/.5ML; UG/.5ML; UG/.5ML
0.7 SUSPENSION INTRAMUSCULAR ONCE
Refills: 0 | Status: COMPLETED | OUTPATIENT
Start: 2022-12-17 | End: 2022-12-17

## 2022-12-17 RX ORDER — HEPARIN SODIUM 5000 [USP'U]/ML
INJECTION INTRAVENOUS; SUBCUTANEOUS
Qty: 25000 | Refills: 0 | Status: DISCONTINUED | OUTPATIENT
Start: 2022-12-17 | End: 2022-12-17

## 2022-12-17 RX ORDER — MEROPENEM 1 G/30ML
1000 INJECTION INTRAVENOUS EVERY 8 HOURS
Refills: 0 | Status: DISCONTINUED | OUTPATIENT
Start: 2022-12-17 | End: 2022-12-17

## 2022-12-17 RX ORDER — HEPARIN SODIUM 5000 [USP'U]/ML
4500 INJECTION INTRAVENOUS; SUBCUTANEOUS EVERY 6 HOURS
Refills: 0 | Status: DISCONTINUED | OUTPATIENT
Start: 2022-12-17 | End: 2022-12-17

## 2022-12-17 RX ORDER — AMIODARONE HYDROCHLORIDE 400 MG/1
TABLET ORAL
Refills: 0 | Status: DISCONTINUED | OUTPATIENT
Start: 2022-12-17 | End: 2022-12-23

## 2022-12-17 RX ORDER — SACCHAROMYCES BOULARDII 250 MG
250 POWDER IN PACKET (EA) ORAL
Refills: 0 | Status: DISCONTINUED | OUTPATIENT
Start: 2022-12-17 | End: 2022-12-23

## 2022-12-17 RX ORDER — AMIODARONE HYDROCHLORIDE 400 MG/1
200 TABLET ORAL DAILY
Refills: 0 | Status: DISCONTINUED | OUTPATIENT
Start: 2022-12-21 | End: 2022-12-23

## 2022-12-17 RX ORDER — SPIRONOLACTONE 25 MG/1
25 TABLET, FILM COATED ORAL DAILY
Refills: 0 | Status: DISCONTINUED | OUTPATIENT
Start: 2022-12-17 | End: 2022-12-19

## 2022-12-17 RX ORDER — HEPARIN SODIUM 5000 [USP'U]/ML
9000 INJECTION INTRAVENOUS; SUBCUTANEOUS EVERY 6 HOURS
Refills: 0 | Status: DISCONTINUED | OUTPATIENT
Start: 2022-12-17 | End: 2022-12-17

## 2022-12-17 RX ORDER — MEROPENEM 1 G/30ML
1000 INJECTION INTRAVENOUS EVERY 8 HOURS
Refills: 0 | Status: DISCONTINUED | OUTPATIENT
Start: 2022-12-17 | End: 2022-12-19

## 2022-12-17 RX ORDER — AMIODARONE HYDROCHLORIDE 400 MG/1
400 TABLET ORAL EVERY 8 HOURS
Refills: 0 | Status: COMPLETED | OUTPATIENT
Start: 2022-12-17 | End: 2022-12-21

## 2022-12-17 RX ADMIN — EZETIMIBE 10 MILLIGRAM(S): 10 TABLET ORAL at 12:37

## 2022-12-17 RX ADMIN — GABAPENTIN 800 MILLIGRAM(S): 400 CAPSULE ORAL at 13:40

## 2022-12-17 RX ADMIN — Medication 3 MILLILITER(S): at 20:13

## 2022-12-17 RX ADMIN — ZOLPIDEM TARTRATE 5 MILLIGRAM(S): 10 TABLET ORAL at 22:42

## 2022-12-17 RX ADMIN — Medication 3 MILLILITER(S): at 08:08

## 2022-12-17 RX ADMIN — Medication 250 MILLIGRAM(S): at 22:42

## 2022-12-17 RX ADMIN — Medication 3 MILLILITER(S): at 04:24

## 2022-12-17 RX ADMIN — Medication 3 MILLILITER(S): at 15:17

## 2022-12-17 RX ADMIN — AMIODARONE HYDROCHLORIDE 400 MILLIGRAM(S): 400 TABLET ORAL at 16:38

## 2022-12-17 RX ADMIN — MEROPENEM 1000 MILLIGRAM(S): 1 INJECTION INTRAVENOUS at 22:42

## 2022-12-17 RX ADMIN — ATORVASTATIN CALCIUM 80 MILLIGRAM(S): 80 TABLET, FILM COATED ORAL at 22:41

## 2022-12-17 RX ADMIN — GABAPENTIN 800 MILLIGRAM(S): 400 CAPSULE ORAL at 22:42

## 2022-12-17 RX ADMIN — HEPARIN SODIUM 2000 UNIT(S)/HR: 5000 INJECTION INTRAVENOUS; SUBCUTANEOUS at 19:18

## 2022-12-17 RX ADMIN — Medication 120 MILLIGRAM(S): at 17:29

## 2022-12-17 RX ADMIN — SODIUM CHLORIDE 3 MILLILITER(S): 9 INJECTION INTRAMUSCULAR; INTRAVENOUS; SUBCUTANEOUS at 13:37

## 2022-12-17 RX ADMIN — SODIUM CHLORIDE 3 MILLILITER(S): 9 INJECTION INTRAMUSCULAR; INTRAVENOUS; SUBCUTANEOUS at 05:17

## 2022-12-17 RX ADMIN — Medication 8: at 22:43

## 2022-12-17 RX ADMIN — SODIUM CHLORIDE 3 MILLILITER(S): 9 INJECTION INTRAMUSCULAR; INTRAVENOUS; SUBCUTANEOUS at 22:37

## 2022-12-17 RX ADMIN — AMIODARONE HYDROCHLORIDE 400 MILLIGRAM(S): 400 TABLET ORAL at 22:42

## 2022-12-17 RX ADMIN — GABAPENTIN 800 MILLIGRAM(S): 400 CAPSULE ORAL at 05:46

## 2022-12-17 RX ADMIN — MONTELUKAST 10 MILLIGRAM(S): 4 TABLET, CHEWABLE ORAL at 22:42

## 2022-12-17 RX ADMIN — DAPAGLIFLOZIN 10 MILLIGRAM(S): 10 TABLET, FILM COATED ORAL at 22:41

## 2022-12-17 RX ADMIN — Medication 100 MILLIGRAM(S): at 05:46

## 2022-12-17 RX ADMIN — HEPARIN SODIUM 2000 UNIT(S)/HR: 5000 INJECTION INTRAVENOUS; SUBCUTANEOUS at 16:36

## 2022-12-17 NOTE — PROGRESS NOTE ADULT - SUBJECTIVE AND OBJECTIVE BOX
Patient is a 72y old  Female who presents with a chief complaint of SOB, Aortic Stenosis (15 Dec 2022 16:13)    HPI:  72F former smoker w/ hx of Afib on coumadin, HTN, HLD, DM, morbid obesity w/ prior lap band now removed, chronic LE venous stasis w/ recurrent cellulitis referred to ED by her cardiologist due to ongoing dyspnea with exertion with associated LE edema and was found to have severe aortic stenosis on Echo and now admitted for TAVR evaluation. Pt denies any prior formal pulmonary diagnosis. She was recently prescribed albuterol inhaler but did not note any improvement with use. Pt denies any associated fevers/chills. Denies any associated cough. Denies any chest pain. Denies any wheezing. Denies any change in LE edema. Reports she previously had a sleep study for ISIDRO and was told it was negative. Pt reports she is winded with a few feet walking and is unable to climb stairs. Pt was recently admitted to [facility] 11/29-12/2 with dyspnea and CT chest showed GGO. She was treated for heart failure exacerbation and was also found to have ESBL E coli and treated with meropenem. Repeat CT chest with few RUL patchy opacities.      Interval hx:   No acute events overnight. Remains on 2L NC sating well. She still endorses dyspnea with exertion. Remains afebrile. Urine cx with GNR.     PAST MEDICAL & SURGICAL HISTORY:  Diabetes mellitus type II, controlled  Atrial fibrillation  Congestive heart failure  Dyspnea  Morbid obesity with BMI of 40.0-44.9, adult  Neuropathy  Peripheral venous insufficiency  Thyroid nodule  HTN (hypertension)  Cellulitis  History of esophagogastroduodenoscopy (EGD)  H/O colonoscopy  Other complications of gastric band procedure  S/P left knee arthroscopy  Status post medial meniscus repair    MEDICATIONS  (STANDING):  albuterol/ipratropium for Nebulization 3 milliLiter(s) Nebulizer every 6 hours  aMIOdarone    Tablet   Oral   aMIOdarone    Tablet 400 milliGRAM(s) Oral every 8 hours  atorvastatin 80 milliGRAM(s) Oral at bedtime  dapagliflozin 10 milliGRAM(s) Oral every 24 hours  dextrose 50% Injectable 25 Gram(s) IV Push once  dextrose 50% Injectable 12.5 Gram(s) IV Push once  dextrose 50% Injectable 25 Gram(s) IV Push once  dextrose Oral Gel 15 Gram(s) Oral once  ezetimibe 10 milliGRAM(s) Oral daily  gabapentin 800 milliGRAM(s) Oral three times a day  glucagon  Injectable 1 milliGRAM(s) IntraMuscular once  heparin  Infusion.  Unit(s)/Hr (20 mL/Hr) IV Continuous <Continuous>  influenza  Vaccine (HIGH DOSE) 0.7 milliLiter(s) IntraMuscular once  insulin lispro (ADMELOG) corrective regimen sliding scale   SubCutaneous Before meals and at bedtime  meropenem Injectable 1000 milliGRAM(s) IV Push every 8 hours  metoprolol succinate  milliGRAM(s) Oral daily  montelukast 10 milliGRAM(s) Oral at bedtime  saccharomyces boulardii 250 milliGRAM(s) Oral two times a day  sodium chloride 0.9% lock flush 3 milliLiter(s) IV Push every 8 hours  spironolactone 25 milliGRAM(s) Oral daily  verapamil 120 milliGRAM(s) Oral two times a day    MEDICATIONS  (PRN):  acetaminophen     Tablet .. 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    FH: No hx of pulmonary disease     Vital Signs Last 24 Hrs  T(C): 36.8 (17 Dec 2022 05:30), Max: 36.8 (16 Dec 2022 21:00)  T(F): 98.2 (17 Dec 2022 05:30), Max: 98.3 (16 Dec 2022 21:00)  HR: 105 (17 Dec 2022 15:20) (58 - 107)  BP: 124/78 (17 Dec 2022 05:30) (103/68 - 124/78)  BP(mean): --  RR: 18 (17 Dec 2022 05:30) (18 - 18)  SpO2: 92% (17 Dec 2022 15:20) (92% - 99%)    Parameters below as of 17 Dec 2022 15:20  Patient On (Oxygen Delivery Method): room air    I&O's Summary    16 Dec 2022 07:01  -  17 Dec 2022 07:00  --------------------------------------------------------  IN: 630 mL / OUT: 1300 mL / NET: -670 mL    17 Dec 2022 07:01  -  17 Dec 2022 19:30  --------------------------------------------------------  IN: 240 mL / OUT: 0 mL / NET: 240 mL        LABS:                                    12.1   9.56  )-----------( 385      ( 17 Dec 2022 07:33 )             38.1    12-17    137  |  101  |  16.6  ----------------------------<  97  4.1   |  27.0  |  0.60    Ca    8.6      17 Dec 2022 07:33  Mg     1.9     12-17                    Physical Examination:    General: Alert, in NAD     HEENT: NC/AT, anicteric, EOMI, MMM    PULM: diminished bilaterally with basilar crackles     NECK: Supple, trachea midline    CVS: Regular rate and rhythm, no murmurs, rubs, or gallops    ABD: Soft, nondistended, nontender, normoactive bowel sounds, obese    EXT: venous stasis changes of b/l LE, stable edema per pt     SKIN: Warm and well perfused    NEURO: Alert, oriented, interactive, nonfocal    ROS: 10 point review of system negative except per HPI    RADIOLOGY:   CXR 12/14:    FINDINGS:  CATHETERS AND TUBES: None    PULMONARY: The visualized lungs are clear of airspace consolidations or pleural effusions.  No pneumothorax.    HEART/VASCULAR: Heart size within normal limits allowing for magnification effect of AP projection.    BONES: Visualized osseous structures are intact.    IMPRESSION: No radiographic evidence of active chest disease.    CT chest angio 11.29.22:    FINDINGS:    LUNGS AND AIRWAYS: Patent central airways. Patchy bilateral groundglass opacities.  PLEURA: No pleural effusion.  MEDIASTINUM AND RACHEL: No lymphadenopathy.  VESSELS: No pulmonary embolus. Atherosclerotic ossifications of the aorta and coronary arteries.  HEART: Cardiomegaly. No pericardial effusion.  CHEST WALL AND LOWER NECK: Multinodular thyroid again noted.  VISUALIZED UPPER ABDOMEN: Small hiatal hernia..  BONES: Degenerative changes.    IMPRESSION:  No pulmonary embolus.    Cardiomegaly and patchy ground glass opacities are nonspecific. Correlate for pulmonary edema versus infection.    CT chest 12/15:    IMPRESSION:  A few right upper lobe peripheral patchy opacities. Lungs otherwise clear. Continued follow-up CT recommended.    VERTEBRAL BODY ANALYSIS: Low bone density as described above, consider further workup for osteoporosis.

## 2022-12-17 NOTE — PATIENT PROFILE ADULT - NSPROPTRIGHTNOTIFY_GEN_A_NUR
Karly with [facility] to schedule EGD       She states pt was discharged from the hospital on 01/23 and advised to schedule procedure       She states to call after 2 pm and ask for Cris, RN unit [address] side 1 nurse    declines

## 2022-12-17 NOTE — PROGRESS NOTE ADULT - NS ATTEND AMEND GEN_ALL_CORE FT
Recommend continuation of amiodarone load until ASHLEY/DCCV can be performed likely on Tuesday (if no inpatient TAVR is planned).

## 2022-12-17 NOTE — PROGRESS NOTE ADULT - ASSESSMENT
72F former smoker w/ hx of Afib on coumadin, HTN, HLD, DM, morbid obesity w/ prior lap band now removed, chronic LE venous stasis w/ recurrent cellulitis presenting with dyspnea on exertion/LE edema found to have severe AS and being evaluated for TAVR with pulmonary consulted to evaluate for possible pulmonary source     Dyspnea   likely multifactorial secondary to underlying obstructive pulmonary disease/heart failure exacerbation in setting of AS  PFTs reviewed with mod-severe obstruction, possible restriction   pt does not appear to be in COPD exacerbation at this time, so will hold off on steroids   trialed on DuoNeb with no subjective improvement   CT chest with patchy GGO in RUL which is unlikely the cause of her degree of dyspnea and is much improved from prior CT   low suspicion for PE given pt was compliant with coumadin and INR supratherapeutic on admission   afebrile with no clinical signs of PNA currently; now on empiric meropenem for UTI  incentive spirometry   wean FiO2 as tolerated for goal >90%  diuresis/cardiac regimen per cardiology   will f/u L/RHC

## 2022-12-17 NOTE — PROGRESS NOTE ADULT - SUBJECTIVE AND OBJECTIVE BOX
Patient seen today sitting up in chair with son at bedside. All questions answered. No overnight complaints. Telemetry reviewed.     TELE: AFib with rates in the 70-90bpm range.     MEDICATIONS  (STANDING):  albuterol/ipratropium for Nebulization 3 milliLiter(s) Nebulizer every 6 hours  atorvastatin 80 milliGRAM(s) Oral at bedtime  dapagliflozin 10 milliGRAM(s) Oral every 24 hours  dextrose 50% Injectable 25 Gram(s) IV Push once  dextrose 50% Injectable 12.5 Gram(s) IV Push once  dextrose 50% Injectable 25 Gram(s) IV Push once  dextrose Oral Gel 15 Gram(s) Oral once  ezetimibe 10 milliGRAM(s) Oral daily  gabapentin 800 milliGRAM(s) Oral three times a day  glucagon  Injectable 1 milliGRAM(s) IntraMuscular once  heparin  Infusion.  Unit(s)/Hr (20 mL/Hr) IV Continuous <Continuous>  influenza  Vaccine (HIGH DOSE) 0.7 milliLiter(s) IntraMuscular once  insulin lispro (ADMELOG) corrective regimen sliding scale   SubCutaneous Before meals and at bedtime  metoprolol succinate  milliGRAM(s) Oral daily  montelukast 10 milliGRAM(s) Oral at bedtime  sodium chloride 0.9% lock flush 3 milliLiter(s) IV Push every 8 hours  spironolactone 25 milliGRAM(s) Oral daily  verapamil 120 milliGRAM(s) Oral two times a day    MEDICATIONS  (PRN):  acetaminophen     Tablet .. 975 milliGRAM(s) Oral every 8 hours PRN Mild Pain (1 - 3)  albuterol    90 MICROgram(s) HFA Inhaler 2 Puff(s) Inhalation every 6 hours PRN Shortness of Breath  heparin   Injectable 9000 Unit(s) IV Push every 6 hours PRN For aPTT less than 40  heparin   Injectable 4500 Unit(s) IV Push every 6 hours PRN For aPTT between 40 - 57  zolpidem 5 milliGRAM(s) Oral at bedtime PRN Insomnia  zolpidem 5 milliGRAM(s) Oral at bedtime PRN Insomnia    Allergies    IV DYE, IODINE CONTRAST (Unknown)  latex (Unknown)  penicillin (Hives; Urticaria)  pregabalin (Unknown)    PAST MEDICAL & SURGICAL HISTORY:  Diabetes mellitus type II, controlled  Atrial fibrillation  Congestive heart failure  Dyspnea  Morbid obesity with BMI of 40.0-44.9, adult  Neuropathy  Peripheral venous insufficiency  Thyroid nodule  HTN (hypertension)  Cellulitis  History of esophagogastroduodenoscopy (EGD)  H/O colonoscopy  Other complications of gastric band procedure  S/P left knee arthroscopy  Status post medial meniscus repair    Vital Signs Last 24 Hrs  T(C): 36.8 (17 Dec 2022 05:30), Max: 37.4 (16 Dec 2022 15:33)  T(F): 98.2 (17 Dec 2022 05:30), Max: 99.3 (16 Dec 2022 15:33)  HR: 62 (17 Dec 2022 08:09) (58 - 112)  BP: 124/78 (17 Dec 2022 05:30) (103/68 - 143/86)  RR: 18 (17 Dec 2022 05:30) (18 - 18)  SpO2: 99% (17 Dec 2022 08:09) (95% - 99%)    Physical Exam:  Constitutional: NAD, AAOx3, morbidly obese  Cardiovascular: +S1S2 IRIR; Afib at time of exam; 2/6 SHAHAB  Pulmonary: CTA b/l, unlabored  GI: soft NTND +BS  Extremities: 1+ pitting edema with chronic venous stasis.   Neuro: non focal, CAPPS x4    LABS:                        12.1   9.56  )-----------( 385      ( 17 Dec 2022 07:33 )             38.1     137  |  101  |  16.6  ----------------------------<  97  4.1   |  27.0  |  0.60  Ca    8.6      17 Dec 2022 07:33  Mg     1.9     12-17  PT/INR - ( 17 Dec 2022 07:33 )   PT: 19.2 sec;   INR: 1.65 ratio      Urinalysis Basic - ( 16 Dec 2022 21:49 )  Color: Yellow / Appearance: Clear / S.025 / pH: x  Gluc: x / Ketone: Trace  / Bili: Negative / Urobili: Negative   Blood: x / Protein: Negative / Nitrite: Positive   Leuk Esterase: Small / RBC: 0-2 /HPF / WBC 11-25 /HPF   Sq Epi: x / Non Sq Epi: Few / Bacteria: Few    A/P  72 year old female patient, former smoker, with PMH of HFpEF (LVEF 50-55%), progressive aortic stenosis, persistent atrial fibrillation (s/p prior DCCV x 2; both unsuccessful- last ~3 years ago), HTN, HLD, DM, chronic PVD/prior vascular ulcers (followed by wound care and Dr. Yoo), and morbid obesity/prior lap band (since removed) who was sent in from Dr. George's office (Cardiology) for worsening JETT.  Recent TTE revealed worsening aortic stenosis and was instructed to come to [facility] for TAVR evaluation with Dr. Hopper.  Repeat TTE on admission revealed only moderate aortic stenosis.     Clinical presentation out of proportion to complaints  Afib is well rate controlled on telemetry  CHADSVASC: 5    - Patient would benefit from restoration of SR. Recommend Amiodarone as per yesterdays note - Cancelled by CTS due to ?Hypotension  - R/LHC planned for Monday  - ASHLEY/DCCV can be pursued following angiograms and once anticoagulation will be reestablished and uninterrupted. May not be successful without aid of AAD  - For now rate control with Toprol XL 100mg  - Ultimately would benefit from ablation therapy which the patient is agreeable to. Can be seen as outpatient at [facility] to re-establish care.

## 2022-12-17 NOTE — PROGRESS NOTE ADULT - PROBLEM SELECTOR PLAN 2
Currently rate controlled afib on telemetry.   Continue Toprol and Verapamil for HR control.  On coumadin at home for chemical anticoagulation.   Follow up AM INR. Last INR was 2.55.

## 2022-12-17 NOTE — PROGRESS NOTE ADULT - SUBJECTIVE AND OBJECTIVE BOX
Brief summary:  72yFemale seen and assessed while resting comfortably in hospital bed in NAD.  Expand TAVR eval in progress.  Has no physical complaints.  ROS negative x 10 except as indicated in HPI.    Overnight events: None.  Hospital course progressing as expected.        PAST MEDICAL & SURGICAL HISTORY:  Diabetes mellitus type II, controlled    Atrial fibrillation    Congestive heart failure    Dyspnea    Morbid obesity with BMI of 40.0-44.9, adult    Neuropathy    Peripheral venous insufficiency    Thyroid nodule    HTN (hypertension)    Cellulitis    History of esophagogastroduodenoscopy (EGD)    H/O colonoscopy    Other complications of gastric band procedure    S/P left knee arthroscopy    Status post medial meniscus repair        Medications:  acetaminophen     Tablet .. 975 milliGRAM(s) Oral every 8 hours PRN  albuterol    90 MICROgram(s) HFA Inhaler 2 Puff(s) Inhalation every 6 hours PRN  albuterol/ipratropium for Nebulization 3 milliLiter(s) Nebulizer every 6 hours  aMIOdarone Infusion 1 mG/Min IV Continuous <Continuous>  aMIOdarone Infusion 0.5 mG/Min IV Continuous <Continuous>  atorvastatin 80 milliGRAM(s) Oral at bedtime  dapagliflozin 10 milliGRAM(s) Oral every 24 hours  dextrose 50% Injectable 25 Gram(s) IV Push once  dextrose 50% Injectable 12.5 Gram(s) IV Push once  dextrose 50% Injectable 25 Gram(s) IV Push once  dextrose Oral Gel 15 Gram(s) Oral once  ezetimibe 10 milliGRAM(s) Oral daily  gabapentin 800 milliGRAM(s) Oral three times a day  glucagon  Injectable 1 milliGRAM(s) IntraMuscular once  insulin lispro (ADMELOG) corrective regimen sliding scale   SubCutaneous Before meals and at bedtime  metoprolol succinate  milliGRAM(s) Oral daily  montelukast 10 milliGRAM(s) Oral at bedtime  sodium chloride 0.9% lock flush 3 milliLiter(s) IV Push every 8 hours  verapamil 120 milliGRAM(s) Oral two times a day  zolpidem 5 milliGRAM(s) Oral at bedtime PRN  zolpidem 5 milliGRAM(s) Oral at bedtime PRN      MEDICATIONS  (PRN):  acetaminophen     Tablet .. 975 milliGRAM(s) Oral every 8 hours PRN Mild Pain (1 - 3)  albuterol    90 MICROgram(s) HFA Inhaler 2 Puff(s) Inhalation every 6 hours PRN Shortness of Breath  zolpidem 5 milliGRAM(s) Oral at bedtime PRN Insomnia  zolpidem 5 milliGRAM(s) Oral at bedtime PRN Insomnia        Daily Review:    ABG - ( 15 Dec 2022 01:07 )  pH, Arterial: 7.470 pH, Blood: x     /  pCO2: 41    /  pO2: 118   / HCO3: 30    / Base Excess: 6.1   /  SaO2: 99.3                            12.7   11.04 )-----------( 397      ( 16 Dec 2022 06:47 )             40.6   12-16    137  |  101  |  20.8<H>  ----------------------------<  96  5.0   |  28.0  |  0.62    Ca    9.3      16 Dec 2022 06:47  Mg     1.5     12-16    TPro  7.0  /  Alb  2.9<L>  /  TBili  0.6  /  DBili  x   /  AST  16  /  ALT  21  /  AlkPhos  63  12-15    PT/INR - ( 16 Dec 2022 06:47 )   PT: 29.9 sec;   INR: 2.55 ratio        T(C): 36.8 (12-17-22 @ 00:08), Max: 37.4 (12-16-22 @ 09:45)  HR: 71 (12-17-22 @ 00:08) (69 - 112)  BP: 104/59 (12-17-22 @ 00:08) (103/68 - 157/69)  RR: 18 (12-17-22 @ 00:08) (17 - 18)  SpO2: 98% (12-17-22 @ 00:08) (95% - 99%)      CAPILLARY BLOOD GLUCOSE    POCT Blood Glucose.: 179 mg/dL (16 Dec 2022 21:47)  POCT Blood Glucose.: 155 mg/dL (16 Dec 2022 17:01)  POCT Blood Glucose.: 105 mg/dL (16 Dec 2022 12:11)  POCT Blood Glucose.: 93 mg/dL (16 Dec 2022 08:06)      I&O's Summary    16 Dec 2022 07:01  -  17 Dec 2022 00:22  --------------------------------------------------------  IN: 240 mL / OUT: 900 mL / NET: -660 mL        Physical Exam    Neuro: A+O x 3, non-focal, speech clear and intact  Pulm: coarse breath sounds bilaterally, no wheezing or rales  CV: irregularly irregular, +S1S2  Abd: soft, NT, ND, normoactive bowel sounds  Ext: +DP Pulses b/l, +PT pulses, no edema        Noncon CT Chest 12/15/22    < from: CT Chest No Cont (12.15.22 @ 21:37) >  FINDINGS:    AIRWAYS AND LUNGS: The central tracheobronchial tree is patent.  A few   right upper lobe peripheral patchy opacities. Lungs otherwise clear.    MEDIASTINUM AND PLEURA: There are no enlarged mediastinal, hilar or   axillary lymph nodes. The visualized portion of the thyroid gland is   heterogeneous. There is no pleural effusion. There is no pneumothorax.    HEART AND VESSELS: There is cardiomegaly.  There are atherosclerotic   calcifications of the aorta and coronary arteries.  There is no   pericardial effusion.  Aortic valve calcification.    UPPER ABDOMEN: Images of the upper abdomen demonstrate cholecystectomy.    BONES AND SOFT TISSUES: There are degenerative changes of the spine.  The   soft tissues are unremarkable.    AI VERTEBRAL BODY ANALYSIS:  T11: low bone density of 70 HU, suspicious for osteoporosis.    IMPRESSION:  A few right upper lobe peripheral patchy opacities. Lungs otherwise   clear. Continued follow-up CT recommended.    VERTEBRAL BODY ANALYSIS: Low bone density as described above, consider   further workup for osteoporosis.    --- End of Report ---    < end of copied text >

## 2022-12-17 NOTE — PROGRESS NOTE ADULT - PROBLEM SELECTOR PLAN 1
Patient with Severe AS on outpatient TTE with associated JETT.    Admitted for TAVR evaluation and chronic diastolic heart failure exacerbation.  Repeat TTE showing Moderate AS with an ADRIAN of 1.1.   Heart failure team following.   Diuretics held at this time per heart failure as patient intravascularly dry, with only trace edema.  Continue Toprol as tolerated by HR and BP.   Continue Aldactone and Farxiga for heart failure regimen.   Recommending Entresto as tolerated by SBP.   Patient unable to lie flat due to respiratory status.   Pulmonology following.   Plan for RHC/ LHC with ASHLEY and potential cardioversion once patient optimized from a respiratory standpoint.   Plan to be discussed / reviewed with CT Surgery attending / team during AM rounds.

## 2022-12-17 NOTE — PROGRESS NOTE ADULT - ASSESSMENT
72 year old female former smoker with PMH of morbid obesity, Aortic Stenosis, HTN, HLD, type 2 DM (HA1c 6.3 on Metformin and Farxiga), LE cellulitis, bacteremia, recently admitted to [facility] 11/30-12/8 of ESBL E.Coli and Proteus UTI, lap band (since removed), chronic LE venous stasis with vascular ulcers (followed by Dr. Yoo), presented from home 12/14/22 with complaints of worsening JETT with light exertion and lower extremity edema with associated palpitations. Patient follows with Cardiologist Dr. Mayer and was advised to seek medical attention for recent outpatient TTE demonstrating severe aortic stenosis. Patient admitted to CT Surgery service under Dr. Hopper for TAVR evaluation. Repeat TTE on admission showing Moderate Aortic stenosis with an ADRIAN of 1.1.

## 2022-12-18 LAB
-  AMIKACIN: SIGNIFICANT CHANGE UP
-  AMOXICILLIN/CLAVULANIC ACID: SIGNIFICANT CHANGE UP
-  AMPICILLIN/SULBACTAM: SIGNIFICANT CHANGE UP
-  AMPICILLIN: SIGNIFICANT CHANGE UP
-  AZTREONAM: SIGNIFICANT CHANGE UP
-  CEFAZOLIN: SIGNIFICANT CHANGE UP
-  CEFEPIME: SIGNIFICANT CHANGE UP
-  CEFOXITIN: SIGNIFICANT CHANGE UP
-  CEFTRIAXONE: SIGNIFICANT CHANGE UP
-  CIPROFLOXACIN: SIGNIFICANT CHANGE UP
-  ERTAPENEM: SIGNIFICANT CHANGE UP
-  GENTAMICIN: SIGNIFICANT CHANGE UP
-  IMIPENEM: SIGNIFICANT CHANGE UP
-  LEVOFLOXACIN: SIGNIFICANT CHANGE UP
-  MEROPENEM: SIGNIFICANT CHANGE UP
-  NITROFURANTOIN: SIGNIFICANT CHANGE UP
-  PIPERACILLIN/TAZOBACTAM: SIGNIFICANT CHANGE UP
-  TOBRAMYCIN: SIGNIFICANT CHANGE UP
-  TRIMETHOPRIM/SULFAMETHOXAZOLE: SIGNIFICANT CHANGE UP
ANION GAP SERPL CALC-SCNC: 12 MMOL/L — SIGNIFICANT CHANGE UP (ref 5–17)
APTT BLD: 79.2 SEC — HIGH (ref 27.5–35.5)
APTT BLD: 81.7 SEC — HIGH (ref 27.5–35.5)
BLD GP AB SCN SERPL QL: SIGNIFICANT CHANGE UP
BUN SERPL-MCNC: 14.6 MG/DL — SIGNIFICANT CHANGE UP (ref 8–20)
CALCIUM SERPL-MCNC: 8.7 MG/DL — SIGNIFICANT CHANGE UP (ref 8.4–10.5)
CHLORIDE SERPL-SCNC: 102 MMOL/L — SIGNIFICANT CHANGE UP (ref 96–108)
CO2 SERPL-SCNC: 26 MMOL/L — SIGNIFICANT CHANGE UP (ref 22–29)
CREAT SERPL-MCNC: 0.54 MG/DL — SIGNIFICANT CHANGE UP (ref 0.5–1.3)
CULTURE RESULTS: SIGNIFICANT CHANGE UP
EGFR: 97 ML/MIN/1.73M2 — SIGNIFICANT CHANGE UP
GLUCOSE BLDC GLUCOMTR-MCNC: 117 MG/DL — HIGH (ref 70–99)
GLUCOSE BLDC GLUCOMTR-MCNC: 137 MG/DL — HIGH (ref 70–99)
GLUCOSE BLDC GLUCOMTR-MCNC: 137 MG/DL — HIGH (ref 70–99)
GLUCOSE BLDC GLUCOMTR-MCNC: 157 MG/DL — HIGH (ref 70–99)
GLUCOSE BLDC GLUCOMTR-MCNC: 225 MG/DL — HIGH (ref 70–99)
GLUCOSE SERPL-MCNC: 92 MG/DL — SIGNIFICANT CHANGE UP (ref 70–99)
HCT VFR BLD CALC: 35.3 % — SIGNIFICANT CHANGE UP (ref 34.5–45)
HCT VFR BLD CALC: 37.1 % — SIGNIFICANT CHANGE UP (ref 34.5–45)
HGB BLD-MCNC: 11.3 G/DL — LOW (ref 11.5–15.5)
HGB BLD-MCNC: 11.5 G/DL — SIGNIFICANT CHANGE UP (ref 11.5–15.5)
MAGNESIUM SERPL-MCNC: 1.8 MG/DL — SIGNIFICANT CHANGE UP (ref 1.6–2.6)
MCHC RBC-ENTMCNC: 29.4 PG — SIGNIFICANT CHANGE UP (ref 27–34)
MCHC RBC-ENTMCNC: 30.2 PG — SIGNIFICANT CHANGE UP (ref 27–34)
MCHC RBC-ENTMCNC: 31 GM/DL — LOW (ref 32–36)
MCHC RBC-ENTMCNC: 32 GM/DL — SIGNIFICANT CHANGE UP (ref 32–36)
MCV RBC AUTO: 94.4 FL — SIGNIFICANT CHANGE UP (ref 80–100)
MCV RBC AUTO: 94.9 FL — SIGNIFICANT CHANGE UP (ref 80–100)
METHOD TYPE: SIGNIFICANT CHANGE UP
ORGANISM # SPEC MICROSCOPIC CNT: SIGNIFICANT CHANGE UP
ORGANISM # SPEC MICROSCOPIC CNT: SIGNIFICANT CHANGE UP
PLATELET # BLD AUTO: 363 K/UL — SIGNIFICANT CHANGE UP (ref 150–400)
PLATELET # BLD AUTO: 370 K/UL — SIGNIFICANT CHANGE UP (ref 150–400)
POTASSIUM SERPL-MCNC: 3.6 MMOL/L — SIGNIFICANT CHANGE UP (ref 3.5–5.3)
POTASSIUM SERPL-SCNC: 3.6 MMOL/L — SIGNIFICANT CHANGE UP (ref 3.5–5.3)
RAPID RVP RESULT: SIGNIFICANT CHANGE UP
RBC # BLD: 3.74 M/UL — LOW (ref 3.8–5.2)
RBC # BLD: 3.91 M/UL — SIGNIFICANT CHANGE UP (ref 3.8–5.2)
RBC # FLD: 13.7 % — SIGNIFICANT CHANGE UP (ref 10.3–14.5)
RBC # FLD: 13.9 % — SIGNIFICANT CHANGE UP (ref 10.3–14.5)
SARS-COV-2 RNA SPEC QL NAA+PROBE: SIGNIFICANT CHANGE UP
SARS-COV-2 RNA SPEC QL NAA+PROBE: SIGNIFICANT CHANGE UP
SODIUM SERPL-SCNC: 140 MMOL/L — SIGNIFICANT CHANGE UP (ref 135–145)
SPECIMEN SOURCE: SIGNIFICANT CHANGE UP
WBC # BLD: 9.04 K/UL — SIGNIFICANT CHANGE UP (ref 3.8–10.5)
WBC # BLD: 9.73 K/UL — SIGNIFICANT CHANGE UP (ref 3.8–10.5)
WBC # FLD AUTO: 9.04 K/UL — SIGNIFICANT CHANGE UP (ref 3.8–10.5)
WBC # FLD AUTO: 9.73 K/UL — SIGNIFICANT CHANGE UP (ref 3.8–10.5)

## 2022-12-18 PROCEDURE — 71045 X-RAY EXAM CHEST 1 VIEW: CPT | Mod: 26

## 2022-12-18 PROCEDURE — 99232 SBSQ HOSP IP/OBS MODERATE 35: CPT | Mod: 25

## 2022-12-18 PROCEDURE — 99232 SBSQ HOSP IP/OBS MODERATE 35: CPT

## 2022-12-18 PROCEDURE — 74177 CT ABD & PELVIS W/CONTRAST: CPT | Mod: 26

## 2022-12-18 PROCEDURE — 99223 1ST HOSP IP/OBS HIGH 75: CPT

## 2022-12-18 RX ORDER — DIPHENHYDRAMINE HCL 50 MG
50 CAPSULE ORAL ONCE
Refills: 0 | Status: COMPLETED | OUTPATIENT
Start: 2022-12-18 | End: 2022-12-18

## 2022-12-18 RX ORDER — POTASSIUM CHLORIDE 20 MEQ
40 PACKET (EA) ORAL ONCE
Refills: 0 | Status: COMPLETED | OUTPATIENT
Start: 2022-12-18 | End: 2022-12-18

## 2022-12-18 RX ORDER — INSULIN LISPRO 100/ML
2 VIAL (ML) SUBCUTANEOUS
Refills: 0 | Status: DISCONTINUED | OUTPATIENT
Start: 2022-12-18 | End: 2022-12-23

## 2022-12-18 RX ORDER — MAGNESIUM OXIDE 400 MG ORAL TABLET 241.3 MG
400 TABLET ORAL EVERY 4 HOURS
Refills: 0 | Status: COMPLETED | OUTPATIENT
Start: 2022-12-18 | End: 2022-12-18

## 2022-12-18 RX ORDER — HEPARIN SODIUM 5000 [USP'U]/ML
INJECTION INTRAVENOUS; SUBCUTANEOUS
Qty: 25000 | Refills: 0 | Status: DISCONTINUED | OUTPATIENT
Start: 2022-12-18 | End: 2022-12-23

## 2022-12-18 RX ORDER — ACETAMINOPHEN 500 MG
1000 TABLET ORAL ONCE
Refills: 0 | Status: COMPLETED | OUTPATIENT
Start: 2022-12-18 | End: 2022-12-18

## 2022-12-18 RX ADMIN — HEPARIN SODIUM 2000 UNIT(S)/HR: 5000 INJECTION INTRAVENOUS; SUBCUTANEOUS at 14:45

## 2022-12-18 RX ADMIN — SODIUM CHLORIDE 3 MILLILITER(S): 9 INJECTION INTRAMUSCULAR; INTRAVENOUS; SUBCUTANEOUS at 22:07

## 2022-12-18 RX ADMIN — Medication 3 MILLILITER(S): at 20:36

## 2022-12-18 RX ADMIN — MEROPENEM 1000 MILLIGRAM(S): 1 INJECTION INTRAVENOUS at 14:42

## 2022-12-18 RX ADMIN — Medication 2 UNIT(S): at 18:36

## 2022-12-18 RX ADMIN — MONTELUKAST 10 MILLIGRAM(S): 4 TABLET, CHEWABLE ORAL at 21:59

## 2022-12-18 RX ADMIN — SODIUM CHLORIDE 3 MILLILITER(S): 9 INJECTION INTRAMUSCULAR; INTRAVENOUS; SUBCUTANEOUS at 14:01

## 2022-12-18 RX ADMIN — AMIODARONE HYDROCHLORIDE 400 MILLIGRAM(S): 400 TABLET ORAL at 14:11

## 2022-12-18 RX ADMIN — SODIUM CHLORIDE 3 MILLILITER(S): 9 INJECTION INTRAMUSCULAR; INTRAVENOUS; SUBCUTANEOUS at 07:56

## 2022-12-18 RX ADMIN — GABAPENTIN 800 MILLIGRAM(S): 400 CAPSULE ORAL at 22:02

## 2022-12-18 RX ADMIN — Medication 100 MILLIGRAM(S): at 05:31

## 2022-12-18 RX ADMIN — Medication 3 MILLILITER(S): at 04:32

## 2022-12-18 RX ADMIN — MEROPENEM 1000 MILLIGRAM(S): 1 INJECTION INTRAVENOUS at 05:31

## 2022-12-18 RX ADMIN — Medication 400 MILLIGRAM(S): at 15:04

## 2022-12-18 RX ADMIN — Medication 40 MILLIEQUIVALENT(S): at 11:52

## 2022-12-18 RX ADMIN — ZOLPIDEM TARTRATE 5 MILLIGRAM(S): 10 TABLET ORAL at 00:44

## 2022-12-18 RX ADMIN — Medication 250 MILLIGRAM(S): at 05:31

## 2022-12-18 RX ADMIN — EZETIMIBE 10 MILLIGRAM(S): 10 TABLET ORAL at 11:52

## 2022-12-18 RX ADMIN — HEPARIN SODIUM 2000 UNIT(S)/HR: 5000 INJECTION INTRAVENOUS; SUBCUTANEOUS at 11:53

## 2022-12-18 RX ADMIN — ATORVASTATIN CALCIUM 80 MILLIGRAM(S): 80 TABLET, FILM COATED ORAL at 22:01

## 2022-12-18 RX ADMIN — Medication 120 MILLIGRAM(S): at 05:31

## 2022-12-18 RX ADMIN — HEPARIN SODIUM 2000 UNIT(S)/HR: 5000 INJECTION INTRAVENOUS; SUBCUTANEOUS at 02:39

## 2022-12-18 RX ADMIN — SPIRONOLACTONE 25 MILLIGRAM(S): 25 TABLET, FILM COATED ORAL at 05:31

## 2022-12-18 RX ADMIN — Medication 120 MILLIGRAM(S): at 18:36

## 2022-12-18 RX ADMIN — DAPAGLIFLOZIN 10 MILLIGRAM(S): 10 TABLET, FILM COATED ORAL at 22:01

## 2022-12-18 RX ADMIN — MEROPENEM 1000 MILLIGRAM(S): 1 INJECTION INTRAVENOUS at 21:58

## 2022-12-18 RX ADMIN — Medication 2: at 21:58

## 2022-12-18 RX ADMIN — Medication 3 MILLILITER(S): at 16:12

## 2022-12-18 RX ADMIN — AMIODARONE HYDROCHLORIDE 400 MILLIGRAM(S): 400 TABLET ORAL at 05:30

## 2022-12-18 RX ADMIN — GABAPENTIN 800 MILLIGRAM(S): 400 CAPSULE ORAL at 05:31

## 2022-12-18 RX ADMIN — GABAPENTIN 800 MILLIGRAM(S): 400 CAPSULE ORAL at 14:12

## 2022-12-18 RX ADMIN — Medication 2 UNIT(S): at 12:02

## 2022-12-18 RX ADMIN — MAGNESIUM OXIDE 400 MG ORAL TABLET 400 MILLIGRAM(S): 241.3 TABLET ORAL at 14:12

## 2022-12-18 RX ADMIN — MAGNESIUM OXIDE 400 MG ORAL TABLET 400 MILLIGRAM(S): 241.3 TABLET ORAL at 11:52

## 2022-12-18 RX ADMIN — AMIODARONE HYDROCHLORIDE 400 MILLIGRAM(S): 400 TABLET ORAL at 22:01

## 2022-12-18 RX ADMIN — Medication 50 MILLIGRAM(S): at 17:03

## 2022-12-18 RX ADMIN — Medication 250 MILLIGRAM(S): at 18:36

## 2022-12-18 RX ADMIN — Medication 1000 MILLIGRAM(S): at 16:41

## 2022-12-18 RX ADMIN — Medication 3 MILLILITER(S): at 09:21

## 2022-12-18 RX ADMIN — HEPARIN SODIUM 2000 UNIT(S)/HR: 5000 INJECTION INTRAVENOUS; SUBCUTANEOUS at 19:21

## 2022-12-18 NOTE — CONSULT NOTE ADULT - SUBJECTIVE AND OBJECTIVE BOX
INFECTIOUS DISEASES AND INTERNAL MEDICINE at [place]  =======================================================  Gary Browning MD  Diplomates American Board of Internal Medicine and Infectious Diseases  Telephone [phone]  Fax            [phone]  =======================================================    ANGELINA [id number]      HPI:  72 year old female former smoker with PMH of jmorbid obesity, AS, HTN, HLD, DM, cellulitis, bacteremia, UTI, lap band (since removed) LE venous stenosis, vascular ulcers (followed by Dr. Yoo) presents from home with complaints of worsening JETT with light exertion and lower extremity edema with associated palpitation. Patient follows with Dr. Falk (cardiology) and was advised to seek medical attention for recent TTE demonstrating severe aortic stenosis. At time of exam, Pt denies chest pain, palpitations, dizziness, headache, shortness of breath, abdominal pain or N/V/D/C. Admit to Dr. Hopper for TAVR eval.   PT WITH POSITIVE URINE CX ECOLI ESBL   PT HAD UTI AT [place] EARLIER THIS MONTH  ASKED TO EVALUATE FROM ID STANDPOINT       PAST MEDICAL & SURGICAL HISTORY:  Diabetes mellitus type II, controlled      Atrial fibrillation      Congestive heart failure      Dyspnea      Morbid obesity with BMI of 40.0-44.9, adult      Neuropathy      Peripheral venous insufficiency      Thyroid nodule      HTN (hypertension)      Cellulitis      History of esophagogastroduodenoscopy (EGD)      H/O colonoscopy      Other complications of gastric band procedure      S/P left knee arthroscopy      Status post medial meniscus repair          ANTIBIOTICS  meropenem Injectable 1000 milliGRAM(s) IV Push every 8 hours      Allergies    IV DYE, IODINE CONTRAST (Unknown)  latex (Unknown)  penicillin (Hives; Urticaria)  pregabalin (Unknown)    Intolerances        SOCIAL HISTORY:     FAMILY HX   FAMILY HISTORY:  Family history of breast cancer in mother    Family history of diabetes mellitus in mother        Vital Signs Last 24 Hrs  T(C): 37.1 (18 Dec 2022 05:00), Max: 37.1 (18 Dec 2022 05:00)  T(F): 98.7 (18 Dec 2022 05:00), Max: 98.7 (18 Dec 2022 05:00)  HR: 101 (18 Dec 2022 05:00) (75 - 107)  BP: 120/77 (18 Dec 2022 05:00) (120/77 - 144/74)  BP(mean): --  RR: 18 (18 Dec 2022 05:00) (18 - 18)  SpO2: 98% (18 Dec 2022 05:00) (92% - 98%)    Parameters below as of 18 Dec 2022 05:00  Patient On (Oxygen Delivery Method): nasal cannula  O2 Flow (L/min): 2    Drug Dosing Weight  Height (cm): 177.8 (15 Dec 2022 02:03)  Weight (kg): 114.6 (15 Dec 2022 02:03)  BMI (kg/m2): 36.3 (15 Dec 2022 02:03)  BSA (m2): 2.31 (15 Dec 2022 02:03)      REVIEW OF SYSTEMS:    CONSTITUTIONAL:  As per HPI.    HEENT:  Eyes:  No diplopia or blurred vision. ENT:  No earache, sore throat or runny nose.    CARDIOVASCULAR:  No pressure, squeezing, strangling, tightness, heaviness or aching about the chest, neck, axilla or epigastrium.    RESPIRATORY:  No cough, shortness of breath, PND or orthopnea.    GASTROINTESTINAL:  No nausea, vomiting or diarrhea.    GENITOURINARY:  No dysuria, frequency or urgency.    MUSCULOSKELETAL:  As per HPI.    SKIN:  No change in skin, hair or nails.    NEUROLOGIC:  No paresthesias, fasciculations, seizures or weakness.                  PHYSICAL EXAMINATION:    GENERAL: The patient is a _____in no apparent distress. ___     VITAL SIGNS: T(C): 37.1 (22 @ 05:00), Max: 37.1 (22 @ 05:00)  HR: 101 (22 @ 05:00) (75 - 107)  BP: 120/77 (22 @ 05:00) (120/77 - 144/74)  RR: 18 (22 @ 05:00) (18 - 18)  SpO2: 98% (22 @ 05:00) (92% - 98%)  Wt(kg): --    HEENT: Head is normocephalic and atraumatic.  ANICTERIC  NECK: Supple. No carotid bruits.  No lymphadenopathy or thyromegaly.    LUNGS:COARSE BREATH SOUNDS    HEART: Regular rate and rhythm  2/6 SHAHAB .    ABDOMEN: Soft, nontender, and nondistended.  Positive bowel sounds.  No hepatosplenomegaly was noted. NO REBOUND NO GUARDING    EXTREMITIES: NO EDEMA NO ERYTHEMA    NEUROLOGIC: NON FOCAL      SKIN: No ulceration or induration present. NO RASH        BLOOD CULTURES  Culture Results:   >100,000 CFU/ml Gram Negative Rods  <10,000 CFU/ml Normal Urogenital cherie present (12-15 @ 17:50)       URINE CX          LABS:                        11.3   9.04  )-----------( 363      ( 18 Dec 2022 06:00 )             35.3     12-    140  |  102  |  14.6  ----------------------------<  92  3.6   |  26.0  |  0.54    Ca    8.7      18 Dec 2022 06:00  Mg     1.8     12-18      PT/INR - ( 17 Dec 2022 07:33 )   PT: 19.2 sec;   INR: 1.65 ratio         PTT - ( 17 Dec 2022 22:35 )  PTT:70.2 sec  Urinalysis Basic - ( 16 Dec 2022 21:49 )    Color: Yellow / Appearance: Clear / S.025 / pH: x  Gluc: x / Ketone: Trace  / Bili: Negative / Urobili: Negative   Blood: x / Protein: Negative / Nitrite: Positive   Leuk Esterase: Small / RBC: 0-2 /HPF / WBC 11-25 /HPF   Sq Epi: x / Non Sq Epi: Few / Bacteria: Few        RADIOLOGY & ADDITIONAL STUDIES:      ASSESSMENT/PLAN    72 year old female former smoker with PMH of jmorbid obesity, AS, HTN, HLD, DM, cellulitis, bacteremia, UTI, lap band (since removed) LE venous stenosis, vascular ulcers (followed by Dr. Yoo) presents from home with complaints of worsening JETT with light exertion and lower extremity edema with associated palpitation. Patient follows with Dr. Falk (cardiology) and was advised to seek medical attention for recent TTE demonstrating severe aortic stenosis. At time of exam, Pt denies chest pain, palpitations, dizziness, headache, shortness of breath, abdominal pain or N/V/D/C. Admit to Dr. Hopper for TAVR eval.   PT WITH POSITIVE URINE CX ECOLI ESBL   PT HAD UTI AT [place] EARLIER THIS MONTH TREATED WITH MERREM  PT DENIES ANY  SX AT PRESENT  SHE HAD NOT HAD RECENT UTI PRIOR TO LAST HOSPITAL STAY  SHE REPORTS SHE HAS URINARY INCONTINENCE AND HAD SEEN UROLOGIST MANY YEARS AGO  WILL RECOMMEND FOLLOWUP BLOOD CX   AND WOULD TREAT  WITH MERREM TO COVER ESBL  WILL NEED OUTPT UROLOGY FOLLOWUP AS  CONCERN PT WITH PLADMENT OF NEW VALVE  WILL FOLLOW UP  SPOKE TO CARDIOTHORACIC NO                   ANTOINE FORBES MD INFECTIOUS DISEASES AND INTERNAL MEDICINE at [place]  =======================================================  Gary Browning MD  Diplomates American Board of Internal Medicine and Infectious Diseases  Telephone [phone]  Fax            [phone]  =======================================================    ANGELINA [id number]      HPI:  72 year old female former smoker with PMH of jmorbid obesity, AS, HTN, HLD, DM, cellulitis, bacteremia, UTI, lap band (since removed) LE venous stenosis, vascular ulcers (followed by Dr. Yoo) presents from home with complaints of worsening JETT with light exertion and lower extremity edema with associated palpitation. Patient follows with Dr. Falk (cardiology) and was advised to seek medical attention for recent TTE demonstrating severe aortic stenosis. At time of exam, Pt denies chest pain, palpitations, dizziness, headache, shortness of breath, abdominal pain or N/V/D/C. Admit to Dr. Hopper for TAVR eval.   PT WITH POSITIVE URINE CX ECOLI ESBL   PT HAD UTI AT [place] EARLIER THIS MONTH  ASKED TO EVALUATE FROM ID STANDPOINT       PAST MEDICAL & SURGICAL HISTORY:  Diabetes mellitus type II, controlled      Atrial fibrillation      Congestive heart failure      Dyspnea      Morbid obesity with BMI of 40.0-44.9, adult      Neuropathy      Peripheral venous insufficiency      Thyroid nodule      HTN (hypertension)      Cellulitis      History of esophagogastroduodenoscopy (EGD)      H/O colonoscopy      Other complications of gastric band procedure      S/P left knee arthroscopy      Status post medial meniscus repair          ANTIBIOTICS  meropenem Injectable 1000 milliGRAM(s) IV Push every 8 hours      Allergies    IV DYE, IODINE CONTRAST (Unknown)  latex (Unknown)  penicillin (Hives; Urticaria)  pregabalin (Unknown)    Intolerances        SOCIAL HISTORY:     FAMILY HX   FAMILY HISTORY:  Family history of breast cancer in mother    Family history of diabetes mellitus in mother        Vital Signs Last 24 Hrs  T(C): 37.1 (18 Dec 2022 05:00), Max: 37.1 (18 Dec 2022 05:00)  T(F): 98.7 (18 Dec 2022 05:00), Max: 98.7 (18 Dec 2022 05:00)  HR: 101 (18 Dec 2022 05:00) (75 - 107)  BP: 120/77 (18 Dec 2022 05:00) (120/77 - 144/74)  BP(mean): --  RR: 18 (18 Dec 2022 05:00) (18 - 18)  SpO2: 98% (18 Dec 2022 05:00) (92% - 98%)    Parameters below as of 18 Dec 2022 05:00  Patient On (Oxygen Delivery Method): nasal cannula  O2 Flow (L/min): 2    Drug Dosing Weight  Height (cm): 177.8 (15 Dec 2022 02:03)  Weight (kg): 114.6 (15 Dec 2022 02:03)  BMI (kg/m2): 36.3 (15 Dec 2022 02:03)  BSA (m2): 2.31 (15 Dec 2022 02:03)      REVIEW OF SYSTEMS:    CONSTITUTIONAL:  As per HPI.    HEENT:  Eyes:  No diplopia or blurred vision. ENT:  No earache, sore throat or runny nose.    CARDIOVASCULAR:  No pressure, squeezing, strangling, tightness, heaviness or aching about the chest, neck, axilla or epigastrium.    RESPIRATORY:  No cough, shortness of breath, PND or orthopnea.    GASTROINTESTINAL:  No nausea, vomiting or diarrhea.    GENITOURINARY:  No dysuria, frequency or urgency.    MUSCULOSKELETAL:  As per HPI.    SKIN:  No change in skin, hair or nails.    NEUROLOGIC:  No paresthesias, fasciculations, seizures or weakness.                  PHYSICAL EXAMINATION:    GENERAL: The patient is a _____in no apparent distress. ___     VITAL SIGNS: T(C): 37.1 (22 @ 05:00), Max: 37.1 (22 @ 05:00)  HR: 101 (22 @ 05:00) (75 - 107)  BP: 120/77 (22 @ 05:00) (120/77 - 144/74)  RR: 18 (22 @ 05:00) (18 - 18)  SpO2: 98% (22 @ 05:00) (92% - 98%)  Wt(kg): --    HEENT: Head is normocephalic and atraumatic.  ANICTERIC  NECK: Supple. No carotid bruits.  No lymphadenopathy or thyromegaly.    LUNGS:COARSE BREATH SOUNDS    HEART: Regular rate and rhythm  2/6 SHAHAB .    ABDOMEN: Soft, nontender, and nondistended.  Positive bowel sounds.  No hepatosplenomegaly was noted. NO REBOUND NO GUARDING    EXTREMITIES: NO EDEMA NO ERYTHEMA    NEUROLOGIC: NON FOCAL      SKIN: No ulceration or induration present. NO RASH        BLOOD CULTURES  Culture Results:   >100,000 CFU/ml Gram Negative Rods  <10,000 CFU/ml Normal Urogenital cherie present (12-15 @ 17:50)       URINE CX          LABS:                        11.3   9.04  )-----------( 363      ( 18 Dec 2022 06:00 )             35.3     12-    140  |  102  |  14.6  ----------------------------<  92  3.6   |  26.0  |  0.54    Ca    8.7      18 Dec 2022 06:00  Mg     1.8     12-18      PT/INR - ( 17 Dec 2022 07:33 )   PT: 19.2 sec;   INR: 1.65 ratio         PTT - ( 17 Dec 2022 22:35 )  PTT:70.2 sec  Urinalysis Basic - ( 16 Dec 2022 21:49 )    Color: Yellow / Appearance: Clear / S.025 / pH: x  Gluc: x / Ketone: Trace  / Bili: Negative / Urobili: Negative   Blood: x / Protein: Negative / Nitrite: Positive   Leuk Esterase: Small / RBC: 0-2 /HPF / WBC 11-25 /HPF   Sq Epi: x / Non Sq Epi: Few / Bacteria: Few        RADIOLOGY & ADDITIONAL STUDIES:      ASSESSMENT/PLAN    72 year old female former smoker with PMH of jmorbid obesity, AS, HTN, HLD, DM, cellulitis, bacteremia, UTI, lap band (since removed) LE venous stenosis, vascular ulcers (followed by Dr. Yoo) presents from home with complaints of worsening JETT with light exertion and lower extremity edema with associated palpitation. Patient follows with Dr. Falk (cardiology) and was advised to seek medical attention for recent TTE demonstrating severe aortic stenosis. At time of exam, Pt denies chest pain, palpitations, dizziness, headache, shortness of breath, abdominal pain or N/V/D/C. Admit to Dr. Hopper for TAVR eval.   PT WITH POSITIVE URINE CX ECOLI ESBL   PT HAD UTI AT [place] EARLIER THIS MONTH TREATED WITH MERREM  PT DENIES ANY  SX AT PRESENT  SHE HAD NOT HAD RECENT UTI PRIOR TO LAST HOSPITAL STAY  SHE REPORTS SHE HAS URINARY INCONTINENCE AND HAD SEEN UROLOGIST MANY YEARS AGO  WILL OBTAIN BLOOD CX  X2 SETS   ALTHOUGH LOWER YIELD AS PT  ALREADY ON ABX    AND WOULD TREAT  WITH MERREM TO COVER ESBL  WILL NEED OUTPT UROLOGY FOLLOWUP AS  CONCERN PT WITH PLADMENT OF NEW VALVE  WILL FOLLOW UP  SPOKE TO CARDIOTHORACIC NO                   ANTOINE FORBES MD

## 2022-12-18 NOTE — PROGRESS NOTE ADULT - NS ATTEND AMEND GEN_ALL_CORE FT
Patient seen and examined by me.  Patient is feeling fine, sitting in Chair, no complaints, states she confused about her care plans    T(C): 39 (12-18-22 @ 14:49), Max: 39 (12-18-22 @ 14:49)  HR: 85 (12-18-22 @ 14:05) (67 - 107)  BP: 130/75 (12-18-22 @ 14:05) (117/71 - 144/74)  RR: 18 (12-18-22 @ 14:05) (16 - 18)  SpO2: 98% (12-18-22 @ 14:05) (92% - 98%)  Patient alert and awake.  Chest- Bilateral Clear BS  Cardiac- S1 and S2  Abdomen- Soft    Assessment:  1. Aortic Stenosis  2. CHF    Recommendations:  1. For Cath on Monday      acetaminophen     Tablet .. 975 milliGRAM(s) Oral every 8 hours PRN  acetaminophen   IVPB .. 1000 milliGRAM(s) IV Intermittent once  albuterol    90 MICROgram(s) HFA Inhaler 2 Puff(s) Inhalation every 6 hours PRN  albuterol/ipratropium for Nebulization 3 milliLiter(s) Nebulizer every 6 hours  aMIOdarone    Tablet   Oral   aMIOdarone    Tablet 400 milliGRAM(s) Oral every 8 hours  atorvastatin 80 milliGRAM(s) Oral at bedtime  dapagliflozin 10 milliGRAM(s) Oral every 24 hours  dextrose 50% Injectable 25 Gram(s) IV Push once  dextrose 50% Injectable 12.5 Gram(s) IV Push once  dextrose 50% Injectable 25 Gram(s) IV Push once  dextrose Oral Gel 15 Gram(s) Oral once  ezetimibe 10 milliGRAM(s) Oral daily  gabapentin 800 milliGRAM(s) Oral three times a day  glucagon  Injectable 1 milliGRAM(s) IntraMuscular once  heparin  Infusion.  Unit(s)/Hr IV Continuous <Continuous>  influenza  Vaccine (HIGH DOSE) 0.7 milliLiter(s) IntraMuscular once  insulin lispro (ADMELOG) corrective regimen sliding scale   SubCutaneous Before meals and at bedtime  insulin lispro Injectable (ADMELOG) 2 Unit(s) SubCutaneous three times a day before meals  meropenem Injectable 1000 milliGRAM(s) IV Push every 8 hours  metoprolol succinate  milliGRAM(s) Oral daily  montelukast 10 milliGRAM(s) Oral at bedtime  saccharomyces boulardii 250 milliGRAM(s) Oral two times a day  sodium chloride 0.9% lock flush 3 milliLiter(s) IV Push every 8 hours  spironolactone 25 milliGRAM(s) Oral daily  verapamil 120 milliGRAM(s) Oral two times a day  zolpidem 5 milliGRAM(s) Oral at bedtime PRN  zolpidem 5 milliGRAM(s) Oral at bedtime PRN      I have discussed my recommendation with the PA which are outlined above.

## 2022-12-18 NOTE — PROGRESS NOTE ADULT - PROBLEM SELECTOR PLAN 1
-Euvolemic  -Breathing status stable  -BNP 1023 -> 931  -C/w toprol, aldactone, Verapamil  -Plan for R/LHC tomorrow

## 2022-12-18 NOTE — PROGRESS NOTE ADULT - PROBLEM SELECTOR PLAN 2
Currently rate controlled afib on telemetry.   Continue Toprol and Verapamil for HR control.  On coumadin at home for chemical anticoagulation.   Follow up AM INR. Last INR was 1.65.

## 2022-12-18 NOTE — PROGRESS NOTE ADULT - SUBJECTIVE AND OBJECTIVE BOX
[facility]                                                         CARDIOLOGY AT [facility]                                                                  [address], [place] [address]                                                         Telephone: [phone]. Fax:[phone]                                                                             PROGRESS NOTE    Reason for follow up: Acute Diastolic HF        Review of symptoms:   Cardiac:  No chest pain. No dyspnea. No palpitations.  Respiratory: no cough. No dyspnea  Gastrointestinal: No diarrhea. No abdominal pain. No bleeding.   Neuro: No focal neuro complaints.    Vitals:  T(C): 37.4 (12-18-22 @ 14:05), Max: 37.4 (12-18-22 @ 14:05)  HR: 85 (12-18-22 @ 14:05) (67 - 107)  BP: 130/75 (12-18-22 @ 14:05) (117/71 - 144/74)  RR: 18 (12-18-22 @ 14:05) (16 - 18)  SpO2: 98% (12-18-22 @ 14:05) (92% - 98%)  Wt(kg): --  I&O's Summary    17 Dec 2022 07:01  -  18 Dec 2022 07:00  --------------------------------------------------------  IN: 660 mL / OUT: 250 mL / NET: 410 mL      Weight (kg): 114.6 (12-15 @ 02:03)    PHYSICAL EXAM:  Appearance: Comfortable. No acute distress  HEENT:  Atraumatic. Normocephalic.  Normal oral mucosa  Neurologic: A & O x 3, no gross focal deficits.  Cardiovascular: RRR S1 S2, +murmur, no rubs/gallops. No JVD  Respiratory: Crackles left base, unlabored   Gastrointestinal:  Soft, Non-tender, + BS  Lower Extremities: 2+ Peripheral Pulses, No clubbing, cyanosis, +1 edema  Psychiatry: Patient is calm. No agitation.   Skin: warm and dry.    CURRENT CARDIAC MEDICATIONS:  aMIOdarone    Tablet   Oral   aMIOdarone    Tablet 400 milliGRAM(s) Oral every 8 hours  metoprolol succinate  milliGRAM(s) Oral daily  spironolactone 25 milliGRAM(s) Oral daily  verapamil 120 milliGRAM(s) Oral two times a day      CURRENT OTHER MEDICATIONS:  albuterol    90 MICROgram(s) HFA Inhaler 2 Puff(s) Inhalation every 6 hours PRN Shortness of Breath  albuterol/ipratropium for Nebulization 3 milliLiter(s) Nebulizer every 6 hours  montelukast 10 milliGRAM(s) Oral at bedtime  meropenem Injectable 1000 milliGRAM(s) IV Push every 8 hours  acetaminophen     Tablet .. 975 milliGRAM(s) Oral every 8 hours PRN Mild Pain (1 - 3)  gabapentin 800 milliGRAM(s) Oral three times a day  zolpidem 5 milliGRAM(s) Oral at bedtime PRN Insomnia  zolpidem 5 milliGRAM(s) Oral at bedtime PRN Insomnia  atorvastatin 80 milliGRAM(s) Oral at bedtime  dapagliflozin 10 milliGRAM(s) Oral every 24 hours  dextrose 50% Injectable 25 Gram(s) IV Push once, Stop order after: 1 Doses  dextrose 50% Injectable 12.5 Gram(s) IV Push once, Stop order after: 1 Doses  dextrose 50% Injectable 25 Gram(s) IV Push once, Stop order after: 1 Doses  dextrose Oral Gel 15 Gram(s) Oral once, Stop order after: 1 Doses  ezetimibe 10 milliGRAM(s) Oral daily  glucagon  Injectable 1 milliGRAM(s) IntraMuscular once, Stop order after: 1 Doses  insulin lispro (ADMELOG) corrective regimen sliding scale   SubCutaneous Before meals and at bedtime  insulin lispro Injectable (ADMELOG) 2 Unit(s) SubCutaneous three times a day before meals  heparin  Infusion.  Unit(s)/Hr (20 mL/Hr) IV Continuous <Continuous>  influenza  Vaccine (HIGH DOSE) 0.7 milliLiter(s) IntraMuscular once  sodium chloride 0.9% lock flush 3 milliLiter(s) IV Push every 8 hours      LABS:	 	  ( 14 Dec 2022 21:30 )  Troponin T  X    ,  CPK  X    , CKMB  X    , <H>    , ( 14 Dec 2022 18:00 )  Troponin T  <0.01,  CPK  X    , CKMB  X    , BNP 1023<H>                              11.5   9.73  )-----------( 370      ( 18 Dec 2022 09:39 )             37.1     12-18    140  |  102  |  14.6  ----------------------------<  92  3.6   |  26.0  |  0.54    Ca    8.7      18 Dec 2022 06:00  Mg     1.8     12-18      PT/INR/PTT ( 18 Dec 2022 09:39 )                       :                       :      X            :       79.2                  .        .                   .              .           .       X           .                                       Lipid Profile: Date: 11-30 @ 06:46  Total cholesterol 137; Direct LDL: --; HDL: 51; Triglycerides:87    HgA1c:   TSH: Thyroid Stimulating Hormone, Serum: 0.22 uIU/mL      TELEMETRY: afib      DIAGNOSTIC TESTING:  [ ] Echocardiogram:   < from: TTE Echo Complete w/o Contrast w/ Doppler (12.14.22 @ 20:42) >  PHYSICIAN INTERPRETATION:  Left Ventricle: The left ventricular internal cavity size is normal.   There is moderate left ventricular hypertrophy involving the septal wall.   The LVH involves septalwalls.  Global LV systolic function was normal. Left ventricular ejection   fraction, by visual estimation, is 50 to 55%. Normal segmental left   ventricular systolic function. The mitral in-flow pattern reveals no   discernable A-wave, therefore no comment on diastolic function can be   made.  Right Ventricle: The right ventricular size is moderately enlarged. RV   systolic function is low normal. TV S' 0.1 m/s.  Left Atrium: Mildly enlarged left atrium.  Right Atrium: Severely enlarged right atrium.  Pericardium: There is no evidence of pericardial effusion. There is a   significant pericardial fat pad present.  Mitral Valve: Mild thickening of the anterior and posterior mitral valve   leaflets. There is moderate mitral annular calcification. Mild mitral   valve regurgitation is seen.  Tricuspid Valve: Moderate tricuspid regurgitation is visualized.   Estimated pulmonary artery systolic pressure is 41.4 mmHg assuming a   right atrial pressure of 3 mmHg, which is consistent with mild pulmonary   hypertension.  Aortic Valve: The aortic valve is trileaflet. Moderate aortic stenosis is   present. Mild aortic valve regurgitation is seen.  Aorta: The aortic root and ascending aorta are structurally normal, with   no evidence of dilitation.  Venous: The inferior vena cava is normal. The inferior vena cava was   normal sized, with respiratory size variation greater than 50%.    < end of copied text >

## 2022-12-18 NOTE — PROGRESS NOTE ADULT - SUBJECTIVE AND OBJECTIVE BOX
Brief summary:  72yFemale seen and assessed at bedside.  Pt laying comfortably in hospital bed in NAD.  Has no physical complaints at this time.  Denies f/c, n/v, chest pain, sob, abdominal pain, d/c, urinary symptoms.  ROS negative x 10 except as indicated in HPI.    Overnight events:  Evening FS at 329.  Heparin changed to NS instead of dextrose.  Insulin coverage given.        PAST MEDICAL & SURGICAL HISTORY:  Diabetes mellitus type II, controlled    Atrial fibrillation    Congestive heart failure    Dyspnea    Morbid obesity with BMI of 40.0-44.9, adult    Neuropathy    Peripheral venous insufficiency    Thyroid nodule    HTN (hypertension)    Cellulitis    History of esophagogastroduodenoscopy (EGD)    H/O colonoscopy    Other complications of gastric band procedure    S/P left knee arthroscopy    Status post medial meniscus repair        Medications:  acetaminophen     Tablet .. 975 milliGRAM(s) Oral every 8 hours PRN  albuterol    90 MICROgram(s) HFA Inhaler 2 Puff(s) Inhalation every 6 hours PRN  albuterol/ipratropium for Nebulization 3 milliLiter(s) Nebulizer every 6 hours  aMIOdarone    Tablet   Oral   aMIOdarone    Tablet 400 milliGRAM(s) Oral every 8 hours  atorvastatin 80 milliGRAM(s) Oral at bedtime  dapagliflozin 10 milliGRAM(s) Oral every 24 hours  dextrose 50% Injectable 25 Gram(s) IV Push once  dextrose 50% Injectable 12.5 Gram(s) IV Push once  dextrose 50% Injectable 25 Gram(s) IV Push once  dextrose Oral Gel 15 Gram(s) Oral once  ezetimibe 10 milliGRAM(s) Oral daily  gabapentin 800 milliGRAM(s) Oral three times a day  glucagon  Injectable 1 milliGRAM(s) IntraMuscular once  heparin  Infusion.  Unit(s)/Hr IV Continuous <Continuous>  influenza  Vaccine (HIGH DOSE) 0.7 milliLiter(s) IntraMuscular once  insulin lispro (ADMELOG) corrective regimen sliding scale   SubCutaneous Before meals and at bedtime  meropenem Injectable 1000 milliGRAM(s) IV Push every 8 hours  metoprolol succinate  milliGRAM(s) Oral daily  montelukast 10 milliGRAM(s) Oral at bedtime  saccharomyces boulardii 250 milliGRAM(s) Oral two times a day  sodium chloride 0.9% lock flush 3 milliLiter(s) IV Push every 8 hours  spironolactone 25 milliGRAM(s) Oral daily  verapamil 120 milliGRAM(s) Oral two times a day  zolpidem 5 milliGRAM(s) Oral at bedtime PRN  zolpidem 5 milliGRAM(s) Oral at bedtime PRN      MEDICATIONS  (PRN):  acetaminophen     Tablet .. 975 milliGRAM(s) Oral every 8 hours PRN Mild Pain (1 - 3)  albuterol    90 MICROgram(s) HFA Inhaler 2 Puff(s) Inhalation every 6 hours PRN Shortness of Breath  zolpidem 5 milliGRAM(s) Oral at bedtime PRN Insomnia  zolpidem 5 milliGRAM(s) Oral at bedtime PRN Insomnia        Daily Review:                 11.5   9.38  )-----------( 372      ( 17 Dec 2022 22:35 )             36.7   12-17    137  |  101  |  16.6  ----------------------------<  97  4.1   |  27.0  |  0.60    Ca    8.6      17 Dec 2022 07:33  Mg     1.9     12-17    PT/INR - ( 17 Dec 2022 07:33 )   PT: 19.2 sec;   INR: 1.65 ratio      PTT - ( 17 Dec 2022 22:35 )  PTT:70.2 sec    T(C): 37 (12-17-22 @ 22:30), Max: 37 (12-17-22 @ 22:30)  HR: 89 (12-17-22 @ 22:30) (58 - 107)  BP: 144/74 (12-17-22 @ 22:30) (110/76 - 144/74)  RR: 18 (12-17-22 @ 22:30) (18 - 18)  SpO2: 98% (12-17-22 @ 22:30) (92% - 99%)      CAPILLARY BLOOD GLUCOSE    POCT Blood Glucose.: 329 mg/dL (17 Dec 2022 22:36)  POCT Blood Glucose.: 105 mg/dL (17 Dec 2022 16:46)  POCT Blood Glucose.: 130 mg/dL (17 Dec 2022 12:21)  POCT Blood Glucose.: 126 mg/dL (17 Dec 2022 08:00)      I&O's Summary    16 Dec 2022 07:01  -  17 Dec 2022 07:00  --------------------------------------------------------  IN: 630 mL / OUT: 1300 mL / NET: -670 mL    17 Dec 2022 07:01  -  18 Dec 2022 00:26  --------------------------------------------------------  IN: 240 mL / OUT: 0 mL / NET: 240 mL        Physical Exam    Neuro: A+O x 3, non-focal, speech clear and intact  Pulm: coarse breath sounds bilaterally, no wheezing or rales  CV: RRR, +S1S2  Abd: soft, NT, ND, normoactive bowel sounds  Ext: +DP Pulses b/l, +PT pulses, no edema

## 2022-12-18 NOTE — PROGRESS NOTE ADULT - ASSESSMENT
71 y/o female, former smoker, with HFpEF (LVEF 50-55%), aortic stenosis, atrial fibrillation, HTN, HLD, DM (controlled on Metformin), chronic PVD/prior vascular ulcers (followed by Dr. Yoo), morbid obesity/prior lap band (since removed).  Patient endorses progressive dyspnea since September/October. She had a recent prolonged hospitalization at  where she was treated for cellulitis and bacteremia. She was discharged home last Friday and presented to her primary cardiologist's office for f/u yesterday where she was noted to have significant SOB and was referred to [facility].  She has been treated with intermittent IV diuresis and notes significant improvement in LE edema. However, she continues to endorse orthopnea and was only able to walk ~10 feet with PT today due to SOB.    CTA negative for  PE on 11/29/22.  Labwork remarkable for serum pBNP 1023, INR 4. Negative trops.    TTE 12/14/22: LVEF 50-55%, LVIDd 5.11cm, IVSd 1.2cm, mild LAE, severe AGUILA, moderate RVE with low/normal RV function, mild AI, moderate AS (MG 23, PV 3.02m/s), mild MR, moderate TR, PASP 41.4 mmHg (RAP 3 mmHg).

## 2022-12-19 LAB
ANION GAP SERPL CALC-SCNC: 11 MMOL/L — SIGNIFICANT CHANGE UP (ref 5–17)
APTT BLD: 63.7 SEC — HIGH (ref 27.5–35.5)
BUN SERPL-MCNC: 13.3 MG/DL — SIGNIFICANT CHANGE UP (ref 8–20)
CALCIUM SERPL-MCNC: 9 MG/DL — SIGNIFICANT CHANGE UP (ref 8.4–10.5)
CHLORIDE SERPL-SCNC: 101 MMOL/L — SIGNIFICANT CHANGE UP (ref 96–108)
CO2 SERPL-SCNC: 25 MMOL/L — SIGNIFICANT CHANGE UP (ref 22–29)
CREAT SERPL-MCNC: 0.52 MG/DL — SIGNIFICANT CHANGE UP (ref 0.5–1.3)
EGFR: 98 ML/MIN/1.73M2 — SIGNIFICANT CHANGE UP
GLUCOSE BLDC GLUCOMTR-MCNC: 106 MG/DL — HIGH (ref 70–99)
GLUCOSE BLDC GLUCOMTR-MCNC: 113 MG/DL — HIGH (ref 70–99)
GLUCOSE BLDC GLUCOMTR-MCNC: 129 MG/DL — HIGH (ref 70–99)
GLUCOSE BLDC GLUCOMTR-MCNC: 168 MG/DL — HIGH (ref 70–99)
GLUCOSE SERPL-MCNC: 108 MG/DL — HIGH (ref 70–99)
HCT VFR BLD CALC: 37.6 % — SIGNIFICANT CHANGE UP (ref 34.5–45)
HGB BLD-MCNC: 11.6 G/DL — SIGNIFICANT CHANGE UP (ref 11.5–15.5)
MAGNESIUM SERPL-MCNC: 1.6 MG/DL — SIGNIFICANT CHANGE UP (ref 1.6–2.6)
MCHC RBC-ENTMCNC: 29.5 PG — SIGNIFICANT CHANGE UP (ref 27–34)
MCHC RBC-ENTMCNC: 30.9 GM/DL — LOW (ref 32–36)
MCV RBC AUTO: 95.7 FL — SIGNIFICANT CHANGE UP (ref 80–100)
PLATELET # BLD AUTO: 372 K/UL — SIGNIFICANT CHANGE UP (ref 150–400)
POTASSIUM SERPL-MCNC: 4 MMOL/L — SIGNIFICANT CHANGE UP (ref 3.5–5.3)
POTASSIUM SERPL-SCNC: 4 MMOL/L — SIGNIFICANT CHANGE UP (ref 3.5–5.3)
RBC # BLD: 3.93 M/UL — SIGNIFICANT CHANGE UP (ref 3.8–5.2)
RBC # FLD: 13.9 % — SIGNIFICANT CHANGE UP (ref 10.3–14.5)
SODIUM SERPL-SCNC: 137 MMOL/L — SIGNIFICANT CHANGE UP (ref 135–145)
WBC # BLD: 10.06 K/UL — SIGNIFICANT CHANGE UP (ref 3.8–10.5)
WBC # FLD AUTO: 10.06 K/UL — SIGNIFICANT CHANGE UP (ref 3.8–10.5)

## 2022-12-19 PROCEDURE — 99232 SBSQ HOSP IP/OBS MODERATE 35: CPT

## 2022-12-19 PROCEDURE — 99231 SBSQ HOSP IP/OBS SF/LOW 25: CPT

## 2022-12-19 PROCEDURE — 99233 SBSQ HOSP IP/OBS HIGH 50: CPT

## 2022-12-19 RX ORDER — MEROPENEM 1 G/30ML
1000 INJECTION INTRAVENOUS EVERY 8 HOURS
Refills: 0 | Status: DISCONTINUED | OUTPATIENT
Start: 2022-12-19 | End: 2022-12-23

## 2022-12-19 RX ORDER — MAGNESIUM SULFATE 500 MG/ML
2 VIAL (ML) INJECTION ONCE
Refills: 0 | Status: COMPLETED | OUTPATIENT
Start: 2022-12-19 | End: 2022-12-19

## 2022-12-19 RX ORDER — SPIRONOLACTONE 25 MG/1
25 TABLET, FILM COATED ORAL DAILY
Refills: 0 | Status: DISCONTINUED | OUTPATIENT
Start: 2022-12-19 | End: 2022-12-23

## 2022-12-19 RX ADMIN — Medication 25 GRAM(S): at 08:21

## 2022-12-19 RX ADMIN — Medication 120 MILLIGRAM(S): at 17:53

## 2022-12-19 RX ADMIN — AMIODARONE HYDROCHLORIDE 400 MILLIGRAM(S): 400 TABLET ORAL at 13:29

## 2022-12-19 RX ADMIN — Medication 975 MILLIGRAM(S): at 23:04

## 2022-12-19 RX ADMIN — MONTELUKAST 10 MILLIGRAM(S): 4 TABLET, CHEWABLE ORAL at 21:36

## 2022-12-19 RX ADMIN — Medication 975 MILLIGRAM(S): at 05:25

## 2022-12-19 RX ADMIN — MEROPENEM 1000 MILLIGRAM(S): 1 INJECTION INTRAVENOUS at 13:29

## 2022-12-19 RX ADMIN — Medication 3 MILLILITER(S): at 04:23

## 2022-12-19 RX ADMIN — Medication 2: at 17:53

## 2022-12-19 RX ADMIN — GABAPENTIN 800 MILLIGRAM(S): 400 CAPSULE ORAL at 21:36

## 2022-12-19 RX ADMIN — Medication 2 UNIT(S): at 17:52

## 2022-12-19 RX ADMIN — SPIRONOLACTONE 25 MILLIGRAM(S): 25 TABLET, FILM COATED ORAL at 05:27

## 2022-12-19 RX ADMIN — AMIODARONE HYDROCHLORIDE 400 MILLIGRAM(S): 400 TABLET ORAL at 05:25

## 2022-12-19 RX ADMIN — SODIUM CHLORIDE 3 MILLILITER(S): 9 INJECTION INTRAMUSCULAR; INTRAVENOUS; SUBCUTANEOUS at 05:05

## 2022-12-19 RX ADMIN — GABAPENTIN 800 MILLIGRAM(S): 400 CAPSULE ORAL at 13:29

## 2022-12-19 RX ADMIN — Medication 100 MILLIGRAM(S): at 05:27

## 2022-12-19 RX ADMIN — Medication 250 MILLIGRAM(S): at 17:03

## 2022-12-19 RX ADMIN — AMIODARONE HYDROCHLORIDE 400 MILLIGRAM(S): 400 TABLET ORAL at 21:36

## 2022-12-19 RX ADMIN — HEPARIN SODIUM 2000 UNIT(S)/HR: 5000 INJECTION INTRAVENOUS; SUBCUTANEOUS at 12:15

## 2022-12-19 RX ADMIN — ZOLPIDEM TARTRATE 5 MILLIGRAM(S): 10 TABLET ORAL at 22:52

## 2022-12-19 RX ADMIN — MEROPENEM 1000 MILLIGRAM(S): 1 INJECTION INTRAVENOUS at 21:35

## 2022-12-19 RX ADMIN — Medication 975 MILLIGRAM(S): at 23:30

## 2022-12-19 RX ADMIN — HEPARIN SODIUM 2000 UNIT(S)/HR: 5000 INJECTION INTRAVENOUS; SUBCUTANEOUS at 13:47

## 2022-12-19 RX ADMIN — HEPARIN SODIUM 2000 UNIT(S)/HR: 5000 INJECTION INTRAVENOUS; SUBCUTANEOUS at 02:59

## 2022-12-19 RX ADMIN — GABAPENTIN 800 MILLIGRAM(S): 400 CAPSULE ORAL at 05:26

## 2022-12-19 RX ADMIN — SODIUM CHLORIDE 3 MILLILITER(S): 9 INJECTION INTRAMUSCULAR; INTRAVENOUS; SUBCUTANEOUS at 21:18

## 2022-12-19 RX ADMIN — MEROPENEM 1000 MILLIGRAM(S): 1 INJECTION INTRAVENOUS at 05:27

## 2022-12-19 RX ADMIN — EZETIMIBE 10 MILLIGRAM(S): 10 TABLET ORAL at 10:28

## 2022-12-19 RX ADMIN — Medication 250 MILLIGRAM(S): at 05:26

## 2022-12-19 RX ADMIN — SODIUM CHLORIDE 3 MILLILITER(S): 9 INJECTION INTRAMUSCULAR; INTRAVENOUS; SUBCUTANEOUS at 13:21

## 2022-12-19 RX ADMIN — Medication 120 MILLIGRAM(S): at 05:26

## 2022-12-19 RX ADMIN — HEPARIN SODIUM 2000 UNIT(S)/HR: 5000 INJECTION INTRAVENOUS; SUBCUTANEOUS at 19:12

## 2022-12-19 RX ADMIN — ATORVASTATIN CALCIUM 80 MILLIGRAM(S): 80 TABLET, FILM COATED ORAL at 21:36

## 2022-12-19 NOTE — PROGRESS NOTE ADULT - SUBJECTIVE AND OBJECTIVE BOX
72y old  female who presents with a chief complaint of Severe AS (19 Dec 2022 11:16)      Interval History/Overnight events:    SOB improving , no cough , no chest pain    T(C): 36.8 (12-19-22 @ 12:30), Max: 39 (12-18-22 @ 14:49)  HR: 76 (12-19-22 @ 12:30)  BP: 126/71 (12-19-22 @ 12:30)  RR: 18 (12-19-22 @ 12:30)  SpO2: 99% (12-19-22 @ 12:30)    PHYSICAL EXAM:    GENERAL: comfortable, not in acute distress   HEENT: Anicteric sclera, EOMI  CHEST/LUNG: Clear to auscultation bilaterally  HEART: Regular rate and rhythm; No murmurs, rubs, or gallops  ABDOMEN: Soft, Nontender, Nondistended; Bowel sounds present  EXTREMITIES: No clubbing, cyanosis, or edema      LABS:                          11.6   10.06 )-----------( 372      ( 19 Dec 2022 05:09 )             37.6     12-19    137  |  101  |  13.3  ----------------------------<  108<H>  4.0   |  25.0  |  0.52    Ca    9.0      19 Dec 2022 05:09  Mg     1.6     12-19      PTT - ( 19 Dec 2022 05:09 )  PTT:63.7 sec              MEDICATIONS:    MEDICATIONS  (STANDING):  albuterol/ipratropium for Nebulization 3 milliLiter(s) Nebulizer every 6 hours  aMIOdarone    Tablet   Oral   aMIOdarone    Tablet 400 milliGRAM(s) Oral every 8 hours  atorvastatin 80 milliGRAM(s) Oral at bedtime  dextrose Oral Gel 15 Gram(s) Oral once  ezetimibe 10 milliGRAM(s) Oral daily  gabapentin 800 milliGRAM(s) Oral three times a day  glucagon  Injectable 1 milliGRAM(s) IntraMuscular once  heparin  Infusion.  Unit(s)/Hr (20 mL/Hr) IV Continuous <Continuous>  influenza  Vaccine (HIGH DOSE) 0.7 milliLiter(s) IntraMuscular once  insulin lispro (ADMELOG) corrective regimen sliding scale   SubCutaneous Before meals and at bedtime  insulin lispro Injectable (ADMELOG) 2 Unit(s) SubCutaneous three times a day before meals  meropenem Injectable 1000 milliGRAM(s) IV Push every 8 hours  metoprolol succinate  milliGRAM(s) Oral daily  montelukast 10 milliGRAM(s) Oral at bedtime  saccharomyces boulardii 250 milliGRAM(s) Oral two times a day  sodium chloride 0.9% lock flush 3 milliLiter(s) IV Push every 8 hours  spironolactone 25 milliGRAM(s) Oral daily  verapamil 120 milliGRAM(s) Oral two times a day      MEDICATIONS  (PRN):  acetaminophen     Tablet .. 975 milliGRAM(s) Oral every 8 hours PRN Mild Pain (1 - 3)  albuterol    90 MICROgram(s) HFA Inhaler 2 Puff(s) Inhalation every 6 hours PRN Shortness of Breath  zolpidem 5 milliGRAM(s) Oral at bedtime PRN Insomnia  zolpidem 5 milliGRAM(s) Oral at bedtime PRN Insomnia

## 2022-12-19 NOTE — PROGRESS NOTE ADULT - PROBLEM SELECTOR PLAN 1
-LVEF 50-55%, LVIDd 5.11, moderate RVE with low normal RV function  -Clinically appears euvolemic on exam. She reports good UOP and significant improvement in LE edema however still c/o SOB with minimal exertion which seems to be out of proportion to CHF.  -Continue Toprol-XL 100mg daily, Verapamil 120mg BID, Spironolactone 25mg daily and Farxiga 10mg daily.  -Awaiting R/L heart cath. Continue heparin gtt.  -Pulmonary consult appreciated. -LVEF 50-55%, LVIDd 5.11, moderate RVE with low normal RV function  -NYHA class IIIB symptoms   -Clinically appears close to euvolemia on exam.   -Stop Jardiance in setting of recurrent UTI. Will re-evaluate need for standing diuretic tomorrow.   -Continue Toprol-XL 100mg daily, Verapamil 120mg BID and Spironolactone 25mg daily.  -Awaiting R/L heart cath. Continue heparin gtt.  -Pulmonary consult appreciated.

## 2022-12-19 NOTE — CONSULT NOTE ADULT - SUBJECTIVE AND OBJECTIVE BOX
[facility]                                              INTERVENTIONAL CARDIOLOGY AT [place] [address]                                             Telephone: [phone]. Fax:[phone]                                                       INTERVENTIONAL CARDIOLOGY CONSULTATION NOTE                                                                                             History obtained by: Patient and medical record  Community Cardiologist:   Reason for Consultation: Evaluation for cardiac catheterization  Available pt records reviewed: Yes [ x ] No [  ]    Chief complaint:    Patient is a 72y old  Female who presents with a chief complaint of Severe AS (19 Dec 2022 11:16)      HPI:  72 year old female former smoker with PMH of jmorbid obesity, AS, HTN, HLD, DM, cellulitis, bacteremia, UTI, lap band (since removed) LE venous stenosis, vascular ulcers (followed by Dr. Yoo) presents from home with complaints of worsening JETT with light exertion and lower extremity edema with associated palpitation. Patient follows with Dr. Falk (cardiology) and was advised to seek medical attention for recent TTE demonstrating severe aortic stenosis. At time of exam, Pt denies chest pain, palpitations, dizziness, headache, shortness of breath, abdominal pain or N/V/D/C. Admit to Dr. Hopper for TAVR eval.    (14 Dec 2022 20:20)      Interval history:   Patient admitted for UTI, and CHF   Found to have AS, with valve area of 1.1, and DI: 0.36      Anginal Class:        Angina (Class):  No chest pain        Ischemic Symptoms:     Heart Failure:        Systolic/Diastolic/Combined:  Diastolic HF        NYHA Class (within 2 weeks):       PAST MEDICAL HISTORY  Diabetes mellitus type II, controlled    Atrial fibrillation    Congestive heart failure    Dyspnea    Morbid obesity with BMI of 40.0-44.9, adult    Neuropathy    Peripheral venous insufficiency    Thyroid nodule    HTN (hypertension)    Cellulitis        Associated Risk Factors:        Frailty Assessment: (none/mild/mod/severe): Mild        Cerebrovascular Disease: N       Chronic Lung Disease: N       Peripheral Arterial Disease: N       Chronic Kidney Disease (if yes, what is GFR): N       Uncontrolled Diabetes (if yes, what is HgbA1C or FBS): N       Poorly Controlled Hypertension (if yes, what is SBP): N       Morbid Obesity (if yes, what is BMI): Y       History of Recent Ventricular Arrhythmia: N       Inability to Ambulate Safely: N       Need for Therapeutic Anticoagulation: Y       Antiplatelet or Contrast Allergy: N      PAST SURGICAL HISTORY  History of esophagogastroduodenoscopy (EGD)    H/O colonoscopy    Other complications of gastric band procedure    S/P left knee arthroscopy    Status post medial meniscus repair        SOCIAL HISTORY:  Non Contributory     FAMILY HISTORY:  Family history of breast cancer in mother    Family history of diabetes mellitus in mother      Family History of Premature Cardiovascular Disease:  N     HOME MEDICATIONS:  cholestyramine 4 g/5 g oral powder for reconstitution: 1 packet(s) orally 2 times a day (16 Dec 2022 09:50)  ezetimibe 10 mg oral tablet: 1 tab(s) orally once a day (in the evening) (16 Dec 2022 09:50)  gabapentin 400 mg oral capsule: 2 cap(s) orally 3 times a day (16 Dec 2022 09:50)  metFORMIN 1000 mg oral tablet: 1 tab(s) orally 2 times a day (16 Dec 2022 09:50)  metoprolol succinate 50 mg oral tablet, extended release: 1 tab(s) orally once a day (16 Dec 2022 09:50)  montelukast 10 mg oral tablet: 1 tab(s) orally once a day (at bedtime) (16 Dec 2022 09:50)  pravastatin 10 mg oral tablet: 1 tab(s) orally once a day (in the evening) (16 Dec 2022 09:50)  verapamil 120 mg oral tablet: 1 tab(s) orally 2 times a day (16 Dec 2022 09:50)  warfarin 5 mg oral tablet: 1 tab(s) orally once a day (in the evening)  (16 Dec 2022 09:50)  zolpidem 10 mg oral tablet: 1 tab(s) orally once a day (at bedtime) (16 Dec 2022 09:50)      CURRENT CARDIAC MEDICATIONS:  aMIOdarone    Tablet   Oral   aMIOdarone    Tablet 400 milliGRAM(s) Oral every 8 hours, Stop order after: 12 Doses  metoprolol succinate  milliGRAM(s) Oral daily  spironolactone 25 milliGRAM(s) Oral daily  verapamil 120 milliGRAM(s) Oral two times a day        ALLERGIES:   IV DYE, IODINE CONTRAST (Unknown)  latex (Unknown)  penicillin (Hives; Urticaria)  pregabalin (Unknown)      REVIEW OF SYMPTOMS:   CONSTITUTIONAL: o fever, no chills, no weight loss, no weight gain, no fatigue   CARDIOVASCULAR: No chest pain   RESPIRATORY: no Shortness of breath, no cough, no wheezing  : No dysuria, no hematuria   GI: No dark color stool, no nausea, no diarrhea, no constipation, no abdominal pain   NEURO: No headache, no slurred speech   ALL OTHER REVIEW OF SYSTEMS ARE NEGATIVE.    VITAL SIGNS:  T(C): 36.8 (12-19-22 @ 12:30), Max: 39 (12-18-22 @ 14:49)  T(F): 98.2 (12-19-22 @ 12:30), Max: 102.2 (12-18-22 @ 14:49)  HR: 76 (12-19-22 @ 12:30) (65 - 96)  BP: 126/71 (12-19-22 @ 12:30) (89/57 - 130/75)  RR: 18 (12-19-22 @ 12:30) (16 - 18)  SpO2: 99% (12-19-22 @ 12:30) (96% - 99%)    INTAKE AND OUTPUT:     12-18 @ 07:01  -  12-19 @ 07:00  --------------------------------------------------------  IN: 1220 mL / OUT: 1200 mL / NET: 20 mL    12-19 @ 07:01  -  12-19 @ 13:51  --------------------------------------------------------  IN: 0 mL / OUT: 45 mL / NET: -45 mL        PHYSICAL EXAM:  Constitutional: Comfortable . No acute distress.   HEENT: Atraumatic and normocephalic , neck is supple . no JVD. No carotid bruit.  CNS: A&Ox3. No focal deficits.   Respiratory: CTAB, unlabored   Cardiovascular: RRR normal s1 s2. No murmur. No rubs or gallop.  Gastrointestinal: Soft, non-tender. +Bowel sounds.   Extremities: 2+ Peripheral Pulses, No clubbing, cyanosis, or edema  Psychiatric: Calm . no agitation.   Skin: Warm and dry, no ulcers on extremities     LABS:  ( 14 Dec 2022 21:30 )  Troponin T  X    ,  CPK  X    , CKMB  X    , <H>    , ( 14 Dec 2022 18:00 )  Troponin T  <0.01,  CPK  X    , CKMB  X    , BNP 1023<H>                              11.6   10.06 )-----------( 372      ( 19 Dec 2022 05:09 )             37.6     12-19    137  |  101  |  13.3  ----------------------------<  108<H>  4.0   |  25.0  |  0.52    Ca    9.0      19 Dec 2022 05:09  Mg     1.6     12-19      PTT - ( 19 Dec 2022 05:09 )  PTT:63.7 sec              ECG:   Prior ECG: Yes [  ] No [  ]    CARDIAC TESTING   ECHO:    STRESS:    Cardiac Interventions:    CATH:     ELECTROPHYSIOLOGY:     Cardiac Cath Risk Assessments:  ASA:  Mallampati:  Bleeding Risk:  Creatinine:  GFR:

## 2022-12-19 NOTE — PROGRESS NOTE ADULT - PROBLEM SELECTOR PLAN 2
-Only moderate on TTE (ADRIAN 1.1, PPG 36.5, DI 0.36)  -Possible ASHLEY this week  -May also consider dobutamine stress if necessary -Only moderate on TTE (ADRIAN 1.1, PPG 36.5, DI 0.36)  -Planning for ASHLEY this week  -May also consider dobutamine stress if necessary

## 2022-12-19 NOTE — PROGRESS NOTE ADULT - SUBJECTIVE AND OBJECTIVE BOX
INFECTIOUS DISEASES AND INTERNAL MEDICINE at [place]  =======================================================  Gary Browning MD  Diplomates American Board of Internal Medicine and Infectious Diseases  Telephone [phone]  Fax            [phone]  =======================================================    ANG[place]INA RAMIREZ [id number]    Follow up:    Allergies:  IV DYE, IODINE CONTRAST (Unknown)  latex (Unknown)  penicillin (Hives; Urticaria)  pregabalin (Unknown)      Medications:  acetaminophen     Tablet .. 975 milliGRAM(s) Oral every 8 hours PRN  albuterol    90 MICROgram(s) HFA Inhaler 2 Puff(s) Inhalation every 6 hours PRN  albuterol/ipratropium for Nebulization 3 milliLiter(s) Nebulizer every 6 hours  aMIOdarone    Tablet   Oral   aMIOdarone    Tablet 400 milliGRAM(s) Oral every 8 hours  atorvastatin 80 milliGRAM(s) Oral at bedtime  dextrose 50% Injectable 25 Gram(s) IV Push once  dextrose 50% Injectable 12.5 Gram(s) IV Push once  dextrose 50% Injectable 25 Gram(s) IV Push once  dextrose Oral Gel 15 Gram(s) Oral once  ezetimibe 10 milliGRAM(s) Oral daily  gabapentin 800 milliGRAM(s) Oral three times a day  glucagon  Injectable 1 milliGRAM(s) IntraMuscular once  heparin  Infusion.  Unit(s)/Hr IV Continuous <Continuous>  influenza  Vaccine (HIGH DOSE) 0.7 milliLiter(s) IntraMuscular once  insulin lispro (ADMELOG) corrective regimen sliding scale   SubCutaneous Before meals and at bedtime  insulin lispro Injectable (ADMELOG) 2 Unit(s) SubCutaneous three times a day before meals  meropenem Injectable 1000 milliGRAM(s) IV Push every 8 hours  metoprolol succinate  milliGRAM(s) Oral daily  montelukast 10 milliGRAM(s) Oral at bedtime  saccharomyces boulardii 250 milliGRAM(s) Oral two times a day  sodium chloride 0.9% lock flush 3 milliLiter(s) IV Push every 8 hours  spironolactone 25 milliGRAM(s) Oral daily  verapamil 120 milliGRAM(s) Oral two times a day  zolpidem 5 milliGRAM(s) Oral at bedtime PRN  zolpidem 5 milliGRAM(s) Oral at bedtime PRN    SOCIAL       FAMILY   FAMILY HISTORY:  Family history of breast cancer in mother    Family history of diabetes mellitus in mother      REVIEW OF SYSTEMS:  CONSTITUTIONAL:  No Fever or chills  HEENT:   No diplopia or blurred vision.  No earache, sore throat or runny nose.  CARDIOVASCULAR:  No pressure, squeezing, strangling, tightness, heaviness or aching about the chest, neck, axilla or epigastrium.  RESPIRATORY:  No cough, shortness of breath, PND or orthopnea.  GASTROINTESTINAL:  No nausea, vomiting or diarrhea.  GENITOURINARY:  No dysuria, frequency or urgency. No Blood in urine  MUSCULOSKELETAL:   moves all joints  SKIN:  No change in skin, hair or nails.  NEUROLOGIC:  No paresthesias, fasciculations, seizures or weakness.  PSYCHIATRIC:  No disorder of thought or mood.  ENDOCRINE:  No heat or cold intolerance, polyuria or polydipsia.  HEMATOLOGICAL:  No easy bruising or bleeding.            Physical Exam:  ICU Vital Signs Last 24 Hrs  T(C): 36.8 (19 Dec 2022 12:30), Max: 38.4 (18 Dec 2022 16:40)  T(F): 98.2 (19 Dec 2022 12:30), Max: 101.2 (18 Dec 2022 16:40)  HR: 76 (19 Dec 2022 12:30) (65 - 96)  BP: 126/71 (19 Dec 2022 12:30) (89/57 - 126/71)  BP(mean): 75 (18 Dec 2022 18:29) (68 - 75)  ABP: --  ABP(mean): --  RR: 18 (19 Dec 2022 12:30) (16 - 18)  SpO2: 99% (19 Dec 2022 12:30) (96% - 99%)    O2 Parameters below as of 19 Dec 2022 12:30  Patient On (Oxygen Delivery Method): nasal cannula  O2 Flow (L/min): 2        GEN: NAD,   HEENT: normocephalic and atraumatic. EOMI. JORDAN.    NECK: Supple. No carotid bruits.  No lymphadenopathy or thyromegaly.  LUNGS: Clear to auscultation.  HEART: Regular rate and rhythm without murmur.  ABDOMEN: Soft, nontender, and nondistended.  Positive bowel sounds.    : No CVA tenderness  EXTREMITIES: Without any cyanosis, clubbing, rash, lesions or edema.  MSK: no joint swelling  NEUROLOGIC: Cranial nerves II through XII are grossly intact.  PSYCHIATRIC: Appropriate affect .  SKIN: No ulceration or induration present.        Labs:  Vitals:  ============  T(F): 98.2 (19 Dec 2022 12:30), Max: 101.2 (18 Dec 2022 16:40)  HR: 76 (19 Dec 2022 12:30)  BP: 126/71 (19 Dec 2022 12:30)  RR: 18 (19 Dec 2022 12:30)  SpO2: 99% (19 Dec 2022 12:30) (96% - 99%)  temp max in last 48H T(F): , Max: 102.2 (12-18-22 @ 14:49)    =======================================================  Current Antibiotics:  meropenem Injectable 1000 milliGRAM(s) IV Push every 8 hours    Other medications:  albuterol/ipratropium for Nebulization 3 milliLiter(s) Nebulizer every 6 hours  aMIOdarone    Tablet   Oral   aMIOdarone    Tablet 400 milliGRAM(s) Oral every 8 hours  atorvastatin 80 milliGRAM(s) Oral at bedtime  dextrose 50% Injectable 25 Gram(s) IV Push once  dextrose 50% Injectable 12.5 Gram(s) IV Push once  dextrose 50% Injectable 25 Gram(s) IV Push once  dextrose Oral Gel 15 Gram(s) Oral once  ezetimibe 10 milliGRAM(s) Oral daily  gabapentin 800 milliGRAM(s) Oral three times a day  glucagon  Injectable 1 milliGRAM(s) IntraMuscular once  heparin  Infusion.  Unit(s)/Hr IV Continuous <Continuous>  influenza  Vaccine (HIGH DOSE) 0.7 milliLiter(s) IntraMuscular once  insulin lispro (ADMELOG) corrective regimen sliding scale   SubCutaneous Before meals and at bedtime  insulin lispro Injectable (ADMELOG) 2 Unit(s) SubCutaneous three times a day before meals  metoprolol succinate  milliGRAM(s) Oral daily  montelukast 10 milliGRAM(s) Oral at bedtime  saccharomyces boulardii 250 milliGRAM(s) Oral two times a day  sodium chloride 0.9% lock flush 3 milliLiter(s) IV Push every 8 hours  spironolactone 25 milliGRAM(s) Oral daily  verapamil 120 milliGRAM(s) Oral two times a day      =======================================================  Labs:                        11.6   10.06 )-----------( 372      ( 19 Dec 2022 05:09 )             37.6     12-19    137  |  101  |  13.3  ----------------------------<  108<H>  4.0   |  25.0  |  0.52    Ca    9.0      19 Dec 2022 05:09  Mg     1.6     12-19        Culture - Urine (collected 12-15-22 @ 17:50)  Source: Clean Catch Clean Catch (Midstream)  Final Report (12-18-22 @ 20:31):    >100,000 CFU/ml Escherichia coli ESBL    <10,000 CFU/ml Normal Urogenital cherie present  Organism: Escherichia coli ESBL (12-18-22 @ 20:31)  Organism: Escherichia coli ESBL (12-18-22 @ 20:31)    Sensitivities:      -  Amikacin: S <=16      -  Amoxicillin/Clavulanic Acid: S <=8/4      -  Ampicillin: R >16 These ampicillin results predict results for amoxicillin      -  Ampicillin/Sulbactam: I 16/8 Enterobacter, Klebsiella aerogenes, Citrobacter, and Serratia may develop resistance during prolonged therapy (3-4 days)      -  Aztreonam: R >16      -  Cefazolin: R >16 For uncomplicated UTI with K. pneumoniae, E. coli, or P. mirablis: TOYA <=16 is sensitive and TOYA >=32 is resistant. This also predicts results for oral agents cefaclor, cefdinir, cefpodoxime, cefprozil, cefuroxime axetil, cephalexin and locarbef for uncomplicated UTI. Note that some isolates may be susceptible to these agents while testing resistant to cefazolin.      -  Cefepime: R >16      -  Cefoxitin: S <=8      -  Ceftriaxone: R >32 Enterobacter, Klebsiella aerogenes, Citrobacter, and Serratia may develop resistance during prolonged therapy      -  Ciprofloxacin: R >2      -  Ertapenem: S <=0.5      -  Gentamicin: S <=2      -  Imipenem: S <=1      -  Levofloxacin: R >4      -  Meropenem: S <=1      -  Nitrofurantoin: S <=32 Should not be used to treat pyelonephritis      -  Piperacillin/Tazobactam: S <=8      -  Tobramycin: S <=2      -  Trimethoprim/Sulfamethoxazole: S <=0.5/9.5      Method Type: TOYA      Creatinine, Serum: 0.52 mg/dL (12-19-22 @ 05:09)  Creatinine, Serum: 0.54 mg/dL (12-18-22 @ 06:00)  Creatinine, Serum: 0.60 mg/dL (12-17-22 @ 07:33)  Creatinine, Serum: 0.62 mg/dL (12-16-22 @ 06:47)  Creatinine, Serum: 0.58 mg/dL (12-15-22 @ 06:34)  Creatinine, Serum: 0.59 mg/dL (12-14-22 @ 21:30)  Creatinine, Serum: 0.57 mg/dL (12-14-22 @ 18:00)            WBC Count: 10.06 K/uL (12-19-22 @ 05:09)  WBC Count: 9.73 K/uL (12-18-22 @ 09:39)  WBC Count: 9.04 K/uL (12-18-22 @ 06:00)  WBC Count: 9.38 K/uL (12-17-22 @ 22:35)  WBC Count: 9.56 K/uL (12-17-22 @ 07:33)  WBC Count: 11.04 K/uL (12-16-22 @ 06:47)  WBC Count: 7.89 K/uL (12-15-22 @ 06:34)  WBC Count: 9.74 K/uL (12-14-22 @ 21:30)  WBC Count: 11.38 K/uL (12-14-22 @ 18:00)    SARS-CoV-2: NotDetec (12-18-22 @ 15:06)  COVID-19 PCR: NotDetec (12-18-22 @ 00:45)  SARS-CoV-2: NotDetec (12-14-22 @ 20:06)  COVID-19 PCR: NotDetec (12-01-22 @ 11:00)  SARS-CoV-2: NotDetec (11-29-22 @ 18:24)

## 2022-12-19 NOTE — PROGRESS NOTE ADULT - ASSESSMENT
COPD? No evidence of exacerbation --> Doubt benefit from standing Albuterol/Ipratropium . Suggest switching to Spiriva and C/W Duoneb   Right pleural effusion S/P chest tube placement --> Recommend removing tube giving minimal input and clear CXR   Recall Pulmonary for any questions or concerns

## 2022-12-19 NOTE — PROGRESS NOTE ADULT - ASSESSMENT
HF Attending: Dr. Stevens   Primary Cardiologist: Dr. Mayer ([place])    73 y/o female, former smoker, with HFpEF (LVEF 50-55%), aortic stenosis, atrial fibrillation, HTN, HLD, DM (controlled on Metformin), chronic PVD/prior vascular ulcers (followed by Dr. Yoo), morbid obesity/prior lap band (since removed).  Patient endorses progressive dyspnea since September/October. She had a recent prolonged hospitalization at  where she was treated for cellulitis and bacteremia. She was discharged home Friday 12/9 and presented to her primary cardiologist's office for f/u on 12/13 where she was noted to have significant SOB and was referred to [facility].  She has been treated with intermittent IV diuresis and notes significant improvement in LE edema. However, she continues to endorse orthopnea and was only able to walk ~10 feet with PT today due to SOB. Also being treated with Merrem for Ecoli ESBL in urine. She was febrile 12/18 temp 102.2 without leukocytosis.     CTA negative for  PE on 11/29/22.  Admission labwork remarkable for serum pBNP 1023, INR 4. Negative trops.    TTE 12/14/22: LVEF 50-55%, LVIDd 5.11cm, IVSd 1.2cm, mild LAE, severe AGUILA, moderate RVE with low/normal RV function, mild AI, moderate AS (MG 23, PV 3.02m/s), mild MR, moderate TR, PASP 41.4 mmHg (RAP 3 mmHg).

## 2022-12-19 NOTE — PROGRESS NOTE ADULT - NS ATTEND AMEND GEN_ALL_CORE FT
Patient was seen and examined at bedside with the advanced care provider.    Unfortunately, procedures today were postponed due to fevers.  Temp yesterday 102F in the setting fo ESBL Ecoli UTI, now on meropenum.  Needs to be at least 24 hours afebrile. STOP dapaglifozin.  No plans to resume post infection.  Will reassess tomorrow for the need of a maintenance diuretic in the setting of decreased urine output.  However, if  NPO in AM, would not dose diuretic.

## 2022-12-19 NOTE — CONSULT NOTE ADULT - ASSESSMENT
73 y/o  F with who is former smoker, HFpEF, AS, AF, PVD, morbid obesity presented with progressive dyspnea. Patient is found to have AS on TTE, with Valve area of 1.1, and DI of  0.36  Planned for right and left heart cath for the evaluaiton of AS      INCOMPLETE NOTE

## 2022-12-19 NOTE — PROGRESS NOTE ADULT - ASSESSMENT
72 year old female former smoker with PMH of jmorbid obesity, AS, HTN, HLD, DM, cellulitis, bacteremia, UTI, lap band (since removed) LE venous stenosis, vascular ulcers (followed by Dr. Yoo) presents from home with complaints of worsening JETT with light exertion and lower extremity edema with associated palpitation. Patient follows with Dr. Falk (cardiology) and was advised to seek medical attention for recent TTE demonstrating severe aortic stenosis. At time of exam, Pt denies chest pain, palpitations, dizziness, headache, shortness of breath, abdominal pain or N/V/D/C. Admit to Dr. Hopper for TAVR eval.   PT WITH POSITIVE URINE CX ECOLI ESBL   PT HAD UTI AT [place] EARLIER THIS MONTH TREATED WITH MERREM  PT DENIES ANY  SX AT PRESENT  SHE HAD NOT HAD RECENT UTI PRIOR TO LAST HOSPITAL STAY  SHE REPORTS SHE HAS URINARY INCONTINENCE AND HAD SEEN UROLOGIST MANY YEARS AGO    BLOOD CX  X2 SETS PENDING    ALTHOUGH LOWER YIELD AS PT  ALREADY ON ABX    AND WOULD TREAT  WITH MERREM TO COVER ESBL  PT WITH FEVERS  LAST PM    WILL FOLLOW UP  SPOKE TO CARDIOTHORACIC NP

## 2022-12-19 NOTE — PROGRESS NOTE ADULT - PROBLEM SELECTOR PLAN 2
Currently rate controlled afib on telemetry.   Continue Toprol and Verapamil for HR control.  On coumadin at home for chemical anticoagulation.   Follow up AM INR. Last INR was 1.65.  cont heparin gtt

## 2022-12-19 NOTE — PROGRESS NOTE ADULT - SUBJECTIVE AND OBJECTIVE BOX
NOTE INCOMPLETE  [facility] Advanced Heart Failure  Office: [address] [place] [id number]  Service Phone [phone]  Office Phone: [phone]/Fax: [phone]    Subjective/Objective:    Medications:  acetaminophen     Tablet .. 975 milliGRAM(s) Oral every 8 hours PRN  albuterol    90 MICROgram(s) HFA Inhaler 2 Puff(s) Inhalation every 6 hours PRN  albuterol/ipratropium for Nebulization 3 milliLiter(s) Nebulizer every 6 hours  aMIOdarone    Tablet   Oral   aMIOdarone    Tablet 400 milliGRAM(s) Oral every 8 hours  atorvastatin 80 milliGRAM(s) Oral at bedtime  dapagliflozin 10 milliGRAM(s) Oral every 24 hours  dextrose 50% Injectable 25 Gram(s) IV Push once  dextrose 50% Injectable 12.5 Gram(s) IV Push once  dextrose 50% Injectable 25 Gram(s) IV Push once  dextrose Oral Gel 15 Gram(s) Oral once  ezetimibe 10 milliGRAM(s) Oral daily  gabapentin 800 milliGRAM(s) Oral three times a day  glucagon  Injectable 1 milliGRAM(s) IntraMuscular once  heparin  Infusion.  Unit(s)/Hr IV Continuous <Continuous>  influenza  Vaccine (HIGH DOSE) 0.7 milliLiter(s) IntraMuscular once  insulin lispro (ADMELOG) corrective regimen sliding scale   SubCutaneous Before meals and at bedtime  insulin lispro Injectable (ADMELOG) 2 Unit(s) SubCutaneous three times a day before meals  meropenem Injectable 1000 milliGRAM(s) IV Push every 8 hours  metoprolol succinate  milliGRAM(s) Oral daily  montelukast 10 milliGRAM(s) Oral at bedtime  saccharomyces boulardii 250 milliGRAM(s) Oral two times a day  sodium chloride 0.9% lock flush 3 milliLiter(s) IV Push every 8 hours  spironolactone 25 milliGRAM(s) Oral daily  verapamil 120 milliGRAM(s) Oral two times a day  zolpidem 5 milliGRAM(s) Oral at bedtime PRN  zolpidem 5 milliGRAM(s) Oral at bedtime PRN    Vital Signs Last 24 Hours  T(C): 37.1 (22 @ 08:30), Max: 39 (12-18-22 @ 14:49)  HR: 65 (22 @ 08:30) (65 - 96)  BP: 101/68 (22 @ 08:30) (89/57 - 130/75)  RR: 18 (22 @ 08:30) (16 - 18)  SpO2: 97% (22 @ 08:30) (96% - 98%)    Weight in k ( @ 04:55)    I&O's Summary  18 Dec 2022 07:01  -  19 Dec 2022 07:00  --------------------------------------------------------  IN: 1220 mL / OUT: 1200 mL / NET: 20 mL    Tele:    Physical Exam:  General: No distress. Comfortable.  HEENT: EOM intact.  Neck: Neck supple. JVP not elevated. No masses  Chest: Clear to auscultation bilaterally  CV: Normal S1 and S2. No murmurs, rub, or gallops. Radial pulses normal.  Abdomen: Soft, non-distended, non-tender  Skin: No rashes or skin breakdown  Neurology: Alert and oriented times three. Sensation intact  Psych: Affect normal    Labs:                        11.6   10.06 )-----------( 372      ( 19 Dec 2022 05:09 )             37.6         137  |  101  |  13.3  ----------------------------<  108<H>  4.0   |  25.0  |  0.52    Ca    9.0      19 Dec 2022 05:09  Mg     1.6           PTT - ( 19 Dec 2022 05:09 )  PTT:63.7 sec    Serum Pro-Brain Natriuretic Peptide: 931 pg/mL ( @ 21:30)  Serum Pro-Brain Natriuretic Peptide: 1023 pg/mL ( @ 18:00)               [facility] Advanced Heart Failure  Office: [address] [place] [address]  Service Phone [phone]  Office Phone: [phone]/Fax: [phone]    Subjective/Objective: Pt. febrile over past 24hrs Tmax 102.2 yesterday. Currently on Merrem for UTI - BC pending. Endorses decreased UOP but has been NPO. Continues to have dyspnea on very minimal exertion (5ft).      Medications:  acetaminophen     Tablet .. 975 milliGRAM(s) Oral every 8 hours PRN  albuterol    90 MICROgram(s) HFA Inhaler 2 Puff(s) Inhalation every 6 hours PRN  albuterol/ipratropium for Nebulization 3 milliLiter(s) Nebulizer every 6 hours  aMIOdarone    Tablet   Oral   aMIOdarone    Tablet 400 milliGRAM(s) Oral every 8 hours  atorvastatin 80 milliGRAM(s) Oral at bedtime  dapagliflozin 10 milliGRAM(s) Oral every 24 hours  dextrose 50% Injectable 25 Gram(s) IV Push once  dextrose 50% Injectable 12.5 Gram(s) IV Push once  dextrose 50% Injectable 25 Gram(s) IV Push once  dextrose Oral Gel 15 Gram(s) Oral once  ezetimibe 10 milliGRAM(s) Oral daily  gabapentin 800 milliGRAM(s) Oral three times a day  glucagon  Injectable 1 milliGRAM(s) IntraMuscular once  heparin  Infusion.  Unit(s)/Hr IV Continuous <Continuous>  influenza  Vaccine (HIGH DOSE) 0.7 milliLiter(s) IntraMuscular once  insulin lispro (ADMELOG) corrective regimen sliding scale   SubCutaneous Before meals and at bedtime  insulin lispro Injectable (ADMELOG) 2 Unit(s) SubCutaneous three times a day before meals  meropenem Injectable 1000 milliGRAM(s) IV Push every 8 hours  metoprolol succinate  milliGRAM(s) Oral daily  montelukast 10 milliGRAM(s) Oral at bedtime  saccharomyces boulardii 250 milliGRAM(s) Oral two times a day  sodium chloride 0.9% lock flush 3 milliLiter(s) IV Push every 8 hours  spironolactone 25 milliGRAM(s) Oral daily  verapamil 120 milliGRAM(s) Oral two times a day  zolpidem 5 milliGRAM(s) Oral at bedtime PRN  zolpidem 5 milliGRAM(s) Oral at bedtime PRN    Vital Signs Last 24 Hours  T(C): 37.1 (22 @ 08:30), Max: 39 (22 @ 14:49)  HR: 65 (22 @ 08:30) (65 - 96)  BP: 101/68 (22 @ 08:30) (89/57 - 130/75)  RR: 18 (22 @ 08:30) (16 - 18)  SpO2: 97% (22 @ 08:30) (96% - 98%)    Weight in k ( @ 04:55)    I&O's Summary  18 Dec 2022 07:01  -  19 Dec 2022 07:00  --------------------------------------------------------  IN: 1220 mL / OUT: 1200 mL / NET: 20 mL    Tele: Afib    Physical Exam:  General: Sitting up in chair, in no acute distress.  HEENT: EOMs intact.  Neck: Neck supple. JVP ~8cm H20.  Chest: RLL course  CV: Irregularly irregular. S1, S2. II-III/VI mid peaking holosystolic murmur RUSB.  PV: Trace edema LLE. Extremities slightly cool to touch w/ venous stasis discoloration noted.    Abdomen: Soft, obese  Skin: dry, no rash  Neurology: Alert and oriented times three. Sensation intact  Psych: Affect normal    Labs:                        11.6   10.06 )-----------( 372      ( 19 Dec 2022 05:09 )             37.6         137  |  101  |  13.3  ----------------------------<  108<H>  4.0   |  25.0  |  0.52    Ca    9.0      19 Dec 2022 05:09  Mg     1.6           PTT - ( 19 Dec 2022 05:09 )  PTT:63.7 sec    Serum Pro-Brain Natriuretic Peptide: 931 pg/mL ( @ 21:30)  Serum Pro-Brain Natriuretic Peptide: 1023 pg/mL ( @ 18:00)

## 2022-12-19 NOTE — PROGRESS NOTE ADULT - ASSESSMENT
72 year old female former smoker with PMH of morbid obesity, Aortic Stenosis, HTN, HLD, type 2 DM (HA1c 6.3 on Metformin and Farxiga), LE cellulitis, bacteremia, recently admitted to [facility] 11/30-12/8 of ESBL E.Coli and Proteus UTI, lap band (since removed), chronic LE venous stasis with vascular ulcers (followed by Dr. Yoo), presented from home 12/14/22 with complaints of worsening JETT with light exertion and lower extremity edema with associated palpitations. Patient follows with Cardiologist Dr. Mayer and was advised to seek medical attention for recent outpatient TTE demonstrating severe aortic stenosis. Patient admitted to CT Surgery service under Dr. Hopper for TAVR evaluation. Repeat TTE on admission showing Moderate Aortic stenosis with an ADRIAN of 1.1.

## 2022-12-19 NOTE — PROGRESS NOTE ADULT - SUBJECTIVE AND OBJECTIVE BOX
Brief summary:  72yFemale admitted for TAVR eval     SUBJECTIVE:  Pt lethargic falling asleep during assessment. " Pt states " I am not sure why I am so tired"  pt denies any chest pain, SOB, palpitations, nausea, diarrhea, and or dysuria,     Overnight events:  no significant events overnight       PAST MEDICAL & SURGICAL HISTORY:  Diabetes mellitus type II, controlled      Atrial fibrillation      Congestive heart failure      Dyspnea      Morbid obesity with BMI of 40.0-44.9, adult      Neuropathy      Peripheral venous insufficiency      Thyroid nodule      HTN (hypertension)      Cellulitis      History of esophagogastroduodenoscopy (EGD)      H/O colonoscopy      Other complications of gastric band procedure      S/P left knee arthroscopy      Status post medial meniscus repair          MEDICATIONS  acetaminophen     Tablet .. 975 milliGRAM(s) Oral every 8 hours PRN  albuterol    90 MICROgram(s) HFA Inhaler 2 Puff(s) Inhalation every 6 hours PRN  albuterol/ipratropium for Nebulization 3 milliLiter(s) Nebulizer every 6 hours  aMIOdarone    Tablet   Oral   aMIOdarone    Tablet 400 milliGRAM(s) Oral every 8 hours  atorvastatin 80 milliGRAM(s) Oral at bedtime  dapagliflozin 10 milliGRAM(s) Oral every 24 hours  dextrose 50% Injectable 25 Gram(s) IV Push once  dextrose 50% Injectable 12.5 Gram(s) IV Push once  dextrose 50% Injectable 25 Gram(s) IV Push once  dextrose Oral Gel 15 Gram(s) Oral once  ezetimibe 10 milliGRAM(s) Oral daily  gabapentin 800 milliGRAM(s) Oral three times a day  glucagon  Injectable 1 milliGRAM(s) IntraMuscular once  heparin  Infusion.  Unit(s)/Hr IV Continuous <Continuous>  influenza  Vaccine (HIGH DOSE) 0.7 milliLiter(s) IntraMuscular once  insulin lispro (ADMELOG) corrective regimen sliding scale   SubCutaneous Before meals and at bedtime  insulin lispro Injectable (ADMELOG) 2 Unit(s) SubCutaneous three times a day before meals  meropenem Injectable 1000 milliGRAM(s) IV Push every 8 hours  metoprolol succinate  milliGRAM(s) Oral daily  montelukast 10 milliGRAM(s) Oral at bedtime  saccharomyces boulardii 250 milliGRAM(s) Oral two times a day  sodium chloride 0.9% lock flush 3 milliLiter(s) IV Push every 8 hours  spironolactone 25 milliGRAM(s) Oral daily  verapamil 120 milliGRAM(s) Oral two times a day  zolpidem 5 milliGRAM(s) Oral at bedtime PRN  zolpidem 5 milliGRAM(s) Oral at bedtime PRN  MEDICATIONS  (PRN):  acetaminophen     Tablet .. 975 milliGRAM(s) Oral every 8 hours PRN Mild Pain (1 - 3)  albuterol    90 MICROgram(s) HFA Inhaler 2 Puff(s) Inhalation every 6 hours PRN Shortness of Breath  zolpidem 5 milliGRAM(s) Oral at bedtime PRN Insomnia  zolpidem 5 milliGRAM(s) Oral at bedtime PRN Insomnia      Daily     Daily Weight in k.8 (18 Dec 2022 05:00)                              11.5   9.73  )-----------( 370      ( 18 Dec 2022 09:39 )             37.1   -    140  |  102  |  14.6  ----------------------------<  92  3.6   |  26.0  |  0.54    Ca    8.7      18 Dec 2022 06:00  Mg     1.8     -18        PT/INR - ( 17 Dec 2022 07:33 )   PT: 19.2 sec;   INR: 1.65 ratio         PTT - ( 18 Dec 2022 17:55 )  PTT:81.7 sec      Objective:  T(C): 36.4 (22 @ 20:00), Max: 39 (22 @ 14:49)  HR: 74 (22 @ 20:38) (67 - 101)  BP: 118/75 (22 @ 20:00) (89/57 - 130/75)  RR: 18 (22 @ 20:00) (16 - 18)  SpO2: 98% (22 @ 20:38) (92% - 98%)  Wt(kg): --CAPILLARY BLOOD GLUCOSE      POCT Blood Glucose.: 157 mg/dL (18 Dec 2022 21:01)  POCT Blood Glucose.: 117 mg/dL (18 Dec 2022 17:37)  POCT Blood Glucose.: 137 mg/dL (18 Dec 2022 11:56)  POCT Blood Glucose.: 137 mg/dL (18 Dec 2022 08:05)  POCT Blood Glucose.: 225 mg/dL (18 Dec 2022 00:32)  I&O's Summary    17 Dec 2022 07:01  -  18 Dec 2022 07:00  --------------------------------------------------------  IN: 660 mL / OUT: 250 mL / NET: 410 mL    18 Dec 2022 07:01  -  19 Dec 2022 00:19  --------------------------------------------------------  IN: 960 mL / OUT: 0 mL / NET: 960 mL        Physical Exam  Neuro: A+O x 3, non-focal, speech clear and intact  HEENT:  NCAT, PERRL  Neck: supple, trachea midline  Pulm: CTA, good air entry, equal bilaterally, no rales/rhonchi/wheezing, no accessory muscle use noted  CV: irregularly irregular, +S1S2, + systolic murmur  Abd: soft, NT, ND, + BS  Ext: CAPPS x 4, b/l LE venous stasis changes, and b/l LE trace edema, 2+ DP b/l, distal motor/neuro/circ intact.   Skin: warm, dry, well perfused    Imaging:  CXR:    < from: Xray Chest 1 View- PORTABLE-Urgent (22 @ 20:01) >  ACC: [id number] EXAM:  XR CHEST PORTABLE URGENT 1V                          PROCEDURE DATE:  2022          INTERPRETATION:  Portable chest radiograph    CLINICAL INFORMATION: Chest pain    TECHNIQUE:  Portable  AP chest radiograph.    COMPARISON: 2022 chest x-ray .    FINDINGS:  CATHETERS AND TUBES: None    PULMONARY: The visualized lungs are clear of airspace consolidations or   pleural effusions.   No pneumothorax.    HEART/VASCULAR: Heart size within normal limits allowing for   magnification effect of AP projection.    BONES: Visualized osseous structures are intact.    IMPRESSION:   No radiographic evidence of active chest disease.    --- End of Report ---            KRISTEN CALLE MD; Attending Radiologist  This document has been electronically signed. Dec 15 2022  3:57PM    < end of copied text >    ECG:  < from: 12 Lead ECG (22 @ 23:58) >    Ventricular Rate 83 BPM    Atrial Rate 76 BPM    QRS Duration 108 ms    Q-T Interval 424 ms    QTC Calculation(Bazett) 498 ms    R Axis -30 degrees    T Axis 44 degrees    Diagnosis Line Atrial fibrillation  Left axis deviation  Nonspecific ST abnormality  Abnormal ECG    < end of copied text >

## 2022-12-19 NOTE — PROGRESS NOTE ADULT - PROBLEM SELECTOR PLAN 1
Patient with Severe AS on outpatient TTE with associated JETT.    Admitted for TAVR evaluation and chronic diastolic heart failure exacerbation.  Repeat TTE showing Moderate AS with an ADRIAN of 1.1.   Heart failure team following.   Diuretics held at this time per heart failure as patient intravascularly dry, with only trace edema.  Continue Toprol as tolerated by HR and BP.   Continue Aldactone and Farxiga for heart failure regimen.   Recommending Entresto as tolerated by SBP.   Patient unable to lie flat due to respiratory status.   Pulmonology following.   Plan for RHC/ LHC with ASHLEY and potential cardioversion once patient optimized from a respiratory standpoint.   pending cath result possible ASHLEY/ DCCV    Plan to be discussed / reviewed with CT Surgery attending / team during AM rounds.

## 2022-12-19 NOTE — PROGRESS NOTE ADULT - PROBLEM SELECTOR PLAN 3
-Continue Verapamil for now (recently switched from Diltiazem due to cost).  -EP following. Started on amio load. Possible cardioversion this week.  -Maintain K>4, Mg>2  -AC: heparin gtt

## 2022-12-20 LAB
ANION GAP SERPL CALC-SCNC: 8 MMOL/L — SIGNIFICANT CHANGE UP (ref 5–17)
APTT BLD: 99 SEC — HIGH (ref 27.5–35.5)
BUN SERPL-MCNC: 14.9 MG/DL — SIGNIFICANT CHANGE UP (ref 8–20)
CALCIUM SERPL-MCNC: 8.6 MG/DL — SIGNIFICANT CHANGE UP (ref 8.4–10.5)
CHLORIDE SERPL-SCNC: 103 MMOL/L — SIGNIFICANT CHANGE UP (ref 96–108)
CO2 SERPL-SCNC: 27 MMOL/L — SIGNIFICANT CHANGE UP (ref 22–29)
CREAT SERPL-MCNC: 0.58 MG/DL — SIGNIFICANT CHANGE UP (ref 0.5–1.3)
EGFR: 95 ML/MIN/1.73M2 — SIGNIFICANT CHANGE UP
GLUCOSE BLDC GLUCOMTR-MCNC: 108 MG/DL — HIGH (ref 70–99)
GLUCOSE BLDC GLUCOMTR-MCNC: 142 MG/DL — HIGH (ref 70–99)
GLUCOSE BLDC GLUCOMTR-MCNC: 200 MG/DL — HIGH (ref 70–99)
GLUCOSE BLDC GLUCOMTR-MCNC: 244 MG/DL — HIGH (ref 70–99)
GLUCOSE SERPL-MCNC: 111 MG/DL — HIGH (ref 70–99)
HCT VFR BLD CALC: 34.7 % — SIGNIFICANT CHANGE UP (ref 34.5–45)
HGB BLD-MCNC: 10.8 G/DL — LOW (ref 11.5–15.5)
INR BLD: 1.43 RATIO — HIGH (ref 0.88–1.16)
MAGNESIUM SERPL-MCNC: 1.8 MG/DL — SIGNIFICANT CHANGE UP (ref 1.8–2.6)
MCHC RBC-ENTMCNC: 29.3 PG — SIGNIFICANT CHANGE UP (ref 27–34)
MCHC RBC-ENTMCNC: 31.1 GM/DL — LOW (ref 32–36)
MCV RBC AUTO: 94 FL — SIGNIFICANT CHANGE UP (ref 80–100)
PLATELET # BLD AUTO: 334 K/UL — SIGNIFICANT CHANGE UP (ref 150–400)
POTASSIUM SERPL-MCNC: 4.3 MMOL/L — SIGNIFICANT CHANGE UP (ref 3.5–5.3)
POTASSIUM SERPL-SCNC: 4.3 MMOL/L — SIGNIFICANT CHANGE UP (ref 3.5–5.3)
PROTHROM AB SERPL-ACNC: 16.6 SEC — HIGH (ref 10.5–13.4)
RBC # BLD: 3.69 M/UL — LOW (ref 3.8–5.2)
RBC # FLD: 13.7 % — SIGNIFICANT CHANGE UP (ref 10.3–14.5)
SODIUM SERPL-SCNC: 137 MMOL/L — SIGNIFICANT CHANGE UP (ref 135–145)
WBC # BLD: 7.08 K/UL — SIGNIFICANT CHANGE UP (ref 3.8–10.5)
WBC # FLD AUTO: 7.08 K/UL — SIGNIFICANT CHANGE UP (ref 3.8–10.5)

## 2022-12-20 PROCEDURE — 99233 SBSQ HOSP IP/OBS HIGH 50: CPT

## 2022-12-20 PROCEDURE — 99231 SBSQ HOSP IP/OBS SF/LOW 25: CPT

## 2022-12-20 PROCEDURE — 99152 MOD SED SAME PHYS/QHP 5/>YRS: CPT

## 2022-12-20 PROCEDURE — 93460 R&L HRT ART/VENTRICLE ANGIO: CPT | Mod: 26

## 2022-12-20 RX ORDER — ASPIRIN/CALCIUM CARB/MAGNESIUM 324 MG
81 TABLET ORAL ONCE
Refills: 0 | Status: COMPLETED | OUTPATIENT
Start: 2022-12-20 | End: 2022-12-20

## 2022-12-20 RX ORDER — ZOLPIDEM TARTRATE 10 MG/1
5 TABLET ORAL AT BEDTIME
Refills: 0 | Status: DISCONTINUED | OUTPATIENT
Start: 2022-12-20 | End: 2022-12-23

## 2022-12-20 RX ORDER — FUROSEMIDE 40 MG
40 TABLET ORAL DAILY
Refills: 0 | Status: DISCONTINUED | OUTPATIENT
Start: 2022-12-21 | End: 2022-12-23

## 2022-12-20 RX ORDER — MAGNESIUM SULFATE 500 MG/ML
1 VIAL (ML) INJECTION ONCE
Refills: 0 | Status: COMPLETED | OUTPATIENT
Start: 2022-12-20 | End: 2022-12-20

## 2022-12-20 RX ORDER — HYDROCORTISONE 20 MG
100 TABLET ORAL ONCE
Refills: 0 | Status: COMPLETED | OUTPATIENT
Start: 2022-12-20 | End: 2022-12-20

## 2022-12-20 RX ORDER — FAMOTIDINE 10 MG/ML
20 INJECTION INTRAVENOUS ONCE
Refills: 0 | Status: COMPLETED | OUTPATIENT
Start: 2022-12-20 | End: 2022-12-20

## 2022-12-20 RX ORDER — DIPHENHYDRAMINE HCL 50 MG
50 CAPSULE ORAL ONCE
Refills: 0 | Status: COMPLETED | OUTPATIENT
Start: 2022-12-20 | End: 2022-12-20

## 2022-12-20 RX ORDER — SODIUM CHLORIDE 9 MG/ML
250 INJECTION INTRAMUSCULAR; INTRAVENOUS; SUBCUTANEOUS ONCE
Refills: 0 | Status: COMPLETED | OUTPATIENT
Start: 2022-12-20 | End: 2022-12-20

## 2022-12-20 RX ADMIN — SPIRONOLACTONE 25 MILLIGRAM(S): 25 TABLET, FILM COATED ORAL at 05:32

## 2022-12-20 RX ADMIN — Medication 975 MILLIGRAM(S): at 21:40

## 2022-12-20 RX ADMIN — SODIUM CHLORIDE 3 MILLILITER(S): 9 INJECTION INTRAMUSCULAR; INTRAVENOUS; SUBCUTANEOUS at 23:02

## 2022-12-20 RX ADMIN — Medication 120 MILLIGRAM(S): at 17:07

## 2022-12-20 RX ADMIN — AMIODARONE HYDROCHLORIDE 400 MILLIGRAM(S): 400 TABLET ORAL at 21:40

## 2022-12-20 RX ADMIN — EZETIMIBE 10 MILLIGRAM(S): 10 TABLET ORAL at 12:39

## 2022-12-20 RX ADMIN — SODIUM CHLORIDE 250 MILLILITER(S): 9 INJECTION INTRAMUSCULAR; INTRAVENOUS; SUBCUTANEOUS at 11:38

## 2022-12-20 RX ADMIN — MONTELUKAST 10 MILLIGRAM(S): 4 TABLET, CHEWABLE ORAL at 21:41

## 2022-12-20 RX ADMIN — GABAPENTIN 800 MILLIGRAM(S): 400 CAPSULE ORAL at 05:33

## 2022-12-20 RX ADMIN — Medication 120 MILLIGRAM(S): at 08:32

## 2022-12-20 RX ADMIN — Medication 100 MILLIGRAM(S): at 09:22

## 2022-12-20 RX ADMIN — MEROPENEM 1000 MILLIGRAM(S): 1 INJECTION INTRAVENOUS at 21:41

## 2022-12-20 RX ADMIN — Medication 100 GRAM(S): at 08:33

## 2022-12-20 RX ADMIN — Medication 81 MILLIGRAM(S): at 12:39

## 2022-12-20 RX ADMIN — HEPARIN SODIUM 2000 UNIT(S)/HR: 5000 INJECTION INTRAVENOUS; SUBCUTANEOUS at 01:02

## 2022-12-20 RX ADMIN — Medication 50 MILLIGRAM(S): at 09:22

## 2022-12-20 RX ADMIN — HEPARIN SODIUM 2000 UNIT(S)/HR: 5000 INJECTION INTRAVENOUS; SUBCUTANEOUS at 13:41

## 2022-12-20 RX ADMIN — ZOLPIDEM TARTRATE 5 MILLIGRAM(S): 10 TABLET ORAL at 00:59

## 2022-12-20 RX ADMIN — GABAPENTIN 800 MILLIGRAM(S): 400 CAPSULE ORAL at 13:38

## 2022-12-20 RX ADMIN — AMIODARONE HYDROCHLORIDE 400 MILLIGRAM(S): 400 TABLET ORAL at 05:33

## 2022-12-20 RX ADMIN — Medication 250 MILLIGRAM(S): at 05:34

## 2022-12-20 RX ADMIN — Medication 2: at 21:41

## 2022-12-20 RX ADMIN — ZOLPIDEM TARTRATE 5 MILLIGRAM(S): 10 TABLET ORAL at 23:31

## 2022-12-20 RX ADMIN — ATORVASTATIN CALCIUM 80 MILLIGRAM(S): 80 TABLET, FILM COATED ORAL at 21:41

## 2022-12-20 RX ADMIN — MEROPENEM 1000 MILLIGRAM(S): 1 INJECTION INTRAVENOUS at 13:37

## 2022-12-20 RX ADMIN — GABAPENTIN 800 MILLIGRAM(S): 400 CAPSULE ORAL at 21:40

## 2022-12-20 RX ADMIN — AMIODARONE HYDROCHLORIDE 400 MILLIGRAM(S): 400 TABLET ORAL at 13:38

## 2022-12-20 RX ADMIN — Medication 975 MILLIGRAM(S): at 22:40

## 2022-12-20 RX ADMIN — FAMOTIDINE 20 MILLIGRAM(S): 10 INJECTION INTRAVENOUS at 09:22

## 2022-12-20 RX ADMIN — SODIUM CHLORIDE 3 MILLILITER(S): 9 INJECTION INTRAMUSCULAR; INTRAVENOUS; SUBCUTANEOUS at 05:36

## 2022-12-20 RX ADMIN — Medication 100 MILLIGRAM(S): at 05:33

## 2022-12-20 RX ADMIN — ZOLPIDEM TARTRATE 5 MILLIGRAM(S): 10 TABLET ORAL at 21:35

## 2022-12-20 RX ADMIN — Medication 4: at 17:12

## 2022-12-20 RX ADMIN — Medication 250 MILLIGRAM(S): at 17:07

## 2022-12-20 RX ADMIN — MEROPENEM 1000 MILLIGRAM(S): 1 INJECTION INTRAVENOUS at 05:34

## 2022-12-20 RX ADMIN — HEPARIN SODIUM 2000 UNIT(S)/HR: 5000 INJECTION INTRAVENOUS; SUBCUTANEOUS at 19:38

## 2022-12-20 RX ADMIN — SODIUM CHLORIDE 3 MILLILITER(S): 9 INJECTION INTRAMUSCULAR; INTRAVENOUS; SUBCUTANEOUS at 14:58

## 2022-12-20 RX ADMIN — Medication 2 UNIT(S): at 17:12

## 2022-12-20 NOTE — PROGRESS NOTE ADULT - PROBLEM SELECTOR PLAN 3
-Continue Verapamil for now (recently switched from Diltiazem due to cost).  -EP following. Started on amio load.  Ideally EP to arrange for DCCV tomorrow.  -Maintain K>4, Mg>2  -AC: heparin gtt

## 2022-12-20 NOTE — CONSULT NOTE ADULT - CONSULT REQUESTED DATE/TIME
18-Dec-2022
14-Dec-2022 21:31
20-Dec-2022 09:03
19-Dec-2022 13:51
16-Dec-2022 13:28
15-Dec-2022 09:00

## 2022-12-20 NOTE — CONSULT NOTE ADULT - SUBJECTIVE AND OBJECTIVE BOX
[facility]                                              INTERVENTIONAL CARDIOLOGY AT [place] [address]                                             Telephone: [phone]. Fax:[phone]                                                       INTERVENTIONAL CARDIOLOGY CONSULTATION NOTE                                                                                             History obtained by: Patient and medical record  Community Cardiologist:   Reason for Consultation: Evaluation for cardiac catheterization  Available pt records reviewed: Yes [ x ] No [  ]    Chief complaint:    Patient is a 72y old  Female who presents with a chief complaint of Severe AS (20 Dec 2022 01:40)      HPI:  72 year old female former smoker with PMH of jmorbid obesity, AS, HTN, HLD, DM, cellulitis, bacteremia, UTI, lap band (since removed) LE venous stenosis, vascular ulcers (followed by Dr. Yoo) presents from home with complaints of worsening JETT with light exertion and lower extremity edema with associated palpitation. Patient follows with Dr. Falk (cardiology) and was advised to seek medical attention for recent TTE demonstrating severe aortic stenosis. At time of exam, Pt denies chest pain, palpitations, dizziness, headache, shortness of breath, abdominal pain or N/V/D/C. Admit to Dr. Hopper for TAVR eval.    (14 Dec 2022 20:20)      interval events:   Patient admitted for UTI, and CHF   Found to have AS, with valve area of 1.1, and DI: 0.36    Angina class: No chest pain     NYHA class:  3  diastolic HF           PAST MEDICAL HISTORY  Diabetes mellitus type II, controlled    Atrial fibrillation    Congestive heart failure    Dyspnea    Morbid obesity with BMI of 40.0-44.9, adult    Neuropathy    Peripheral venous insufficiency    Thyroid nodule    HTN (hypertension)    Cellulitis        Associated Risk Factors:        Frailty Assessment: (none/mild/mod/severe): Mod       Cerebrovascular Disease: N       Chronic Lung Disease: N       Peripheral Arterial Disease: N       Chronic Kidney Disease (if yes, what is GFR): N       Uncontrolled Diabetes (if yes, what is HgbA1C or FBS): N       Poorly Controlled Hypertension (if yes, what is SBP): N       Morbid Obesity (if yes, what is BMI): N       History of Recent Ventricular Arrhythmia: N       Inability to Ambulate Safely: N       Need for Therapeutic Anticoagulation: Y        Antiplatelet or Contrast Allergy: N      PAST SURGICAL HISTORY  History of esophagogastroduodenoscopy (EGD)    H/O colonoscopy    Other complications of gastric band procedure    S/P left knee arthroscopy    Status post medial meniscus repair        SOCIAL HISTORY:  Non contributory     FAMILY HISTORY:  Family history of breast cancer in mother    Family history of diabetes mellitus in mother      Family History of Premature Cardiovascular Disease:  No     HOME MEDICATIONS:  cholestyramine 4 g/5 g oral powder for reconstitution: 1 packet(s) orally 2 times a day (16 Dec 2022 09:50)  ezetimibe 10 mg oral tablet: 1 tab(s) orally once a day (in the evening) (16 Dec 2022 09:50)  gabapentin 400 mg oral capsule: 2 cap(s) orally 3 times a day (16 Dec 2022 09:50)  metFORMIN 1000 mg oral tablet: 1 tab(s) orally 2 times a day (16 Dec 2022 09:50)  metoprolol succinate 50 mg oral tablet, extended release: 1 tab(s) orally once a day (16 Dec 2022 09:50)  montelukast 10 mg oral tablet: 1 tab(s) orally once a day (at bedtime) (16 Dec 2022 09:50)  pravastatin 10 mg oral tablet: 1 tab(s) orally once a day (in the evening) (16 Dec 2022 09:50)  verapamil 120 mg oral tablet: 1 tab(s) orally 2 times a day (16 Dec 2022 09:50)  warfarin 5 mg oral tablet: 1 tab(s) orally once a day (in the evening)  (16 Dec 2022 09:50)  zolpidem 10 mg oral tablet: 1 tab(s) orally once a day (at bedtime) (16 Dec 2022 09:50)      CURRENT CARDIAC MEDICATIONS:  aMIOdarone    Tablet   Oral   aMIOdarone    Tablet 400 milliGRAM(s) Oral every 8 hours, Stop order after: 12 Doses  metoprolol succinate  milliGRAM(s) Oral daily  spironolactone 25 milliGRAM(s) Oral daily  verapamil 120 milliGRAM(s) Oral two times a day      Antianginal Therapies:        Beta Blockers:         Calcium Channel Blockers:        Long Acting Nitrates:        Ranexa:     ALLERGIES:   IV DYE, IODINE CONTRAST (Unknown)  latex (Unknown)  penicillin (Hives; Urticaria)  pregabalin (Unknown)      REVIEW OF SYMPTOMS:   CONSTITUTIONAL: o fever, no chills, no weight loss, no weight gain, no fatigue   CARDIOVASCULAR: No chest pain   RESPIRATORY: no Shortness of breath, no cough, no wheezing  : No dysuria, no hematuria   GI: No dark color stool, no nausea, no diarrhea, no constipation, no abdominal pain   NEURO: No headache, no slurred speech   ALL OTHER REVIEW OF SYSTEMS ARE NEGATIVE.    VITAL SIGNS:  T(C): 36.6 (12-20-22 @ 00:37), Max: 36.8 (12-19-22 @ 12:30)  T(F): 97.9 (12-20-22 @ 00:37), Max: 98.3 (12-19-22 @ 16:10)  HR: 76 (12-20-22 @ 00:37) (76 - 95)  BP: 108/58 (12-20-22 @ 05:45) (98/52 - 126/71)  RR: 17 (12-20-22 @ 05:38) (16 - 21)  SpO2: 97% (12-20-22 @ 05:38) (97% - 99%)    INTAKE AND OUTPUT:     12-19 @ 07:01  -  12-20 @ 07:00  --------------------------------------------------------  IN: 600 mL / OUT: 400 mL / NET: 200 mL        PHYSICAL EXAM:  Constitutional: Comfortable . No acute distress.   HEENT: Atraumatic and normocephalic , neck is supple . no JVD. No carotid bruit.  CNS: A&Ox3. No focal deficits.   Respiratory: CTAB, unlabored   Cardiovascular: RRR normal s1 s2. 3/6 systolic murmur   Gastrointestinal: Soft, non-tender. +Bowel sounds.   Extremities: 2+ Peripheral Pulses, No clubbing, cyanosis, or edema  Psychiatric: Calm . no agitation.   Skin: Warm and dry, no ulcers on extremities     LABS:  ( 14 Dec 2022 21:30 )  Troponin T  X    ,  CPK  X    , CKMB  X    , <H>    , ( 14 Dec 2022 18:00 )  Troponin T  <0.01,  CPK  X    , CKMB  X    , BNP 1023<H>                              10.8   7.08  )-----------( 334      ( 20 Dec 2022 04:40 )             34.7     12-20    137  |  103  |  14.9  ----------------------------<  111<H>  4.3   |  27.0  |  0.58    Ca    8.6      20 Dec 2022 04:40  Mg     1.8     12-20      PT/INR - ( 20 Dec 2022 04:40 )   PT: 16.6 sec;   INR: 1.43 ratio         PTT - ( 20 Dec 2022 04:40 )  PTT:99.0 sec                  Cardiac Cath Risk Assessments:  ASA: 3  Mallampati: 2  Bleeding Risk:  4.2   Creatinine: 0.58  GFR: 95

## 2022-12-20 NOTE — CONSULT NOTE ADULT - ASSESSMENT
72 F, former smoker, HFpHF, AS, AF, PVD, Morbid obesity, presented with progressive dypnea. Found to have AS on TTE with valve area of 1.1, DI of 0.36        Plan:  -R/LHC today via RA/BV   -ECG and Labs reviiewed  -ASA 81mg po pre-cath given  -Solucortef 100mg IV X 1, Benadryl 50mg IV and pepcid 20mg IV for iodine allergy  -NS 250ml Bolus pre cath  -Informed consent obtained

## 2022-12-20 NOTE — PROGRESS NOTE ADULT - SUBJECTIVE AND OBJECTIVE BOX
Patient went for [facility] today. Telemetry reviewed.     TELE: Afib with rates in the 70s predominantly.     MEDICATIONS  (STANDING):  aMIOdarone    Tablet   Oral   aMIOdarone    Tablet 400 milliGRAM(s) Oral every 8 hours  aspirin  chewable 81 milliGRAM(s) Oral once  atorvastatin 80 milliGRAM(s) Oral at bedtime  dextrose 50% Injectable 25 Gram(s) IV Push once  dextrose 50% Injectable 12.5 Gram(s) IV Push once  dextrose 50% Injectable 25 Gram(s) IV Push once  dextrose Oral Gel 15 Gram(s) Oral once  ezetimibe 10 milliGRAM(s) Oral daily  gabapentin 800 milliGRAM(s) Oral three times a day  glucagon  Injectable 1 milliGRAM(s) IntraMuscular once  heparin  Infusion.  Unit(s)/Hr (20 mL/Hr) IV Continuous <Continuous>  influenza  Vaccine (HIGH DOSE) 0.7 milliLiter(s) IntraMuscular once  insulin lispro (ADMELOG) corrective regimen sliding scale   SubCutaneous Before meals and at bedtime  insulin lispro Injectable (ADMELOG) 2 Unit(s) SubCutaneous three times a day before meals  meropenem Injectable 1000 milliGRAM(s) IV Push every 8 hours  metoprolol succinate  milliGRAM(s) Oral daily  montelukast 10 milliGRAM(s) Oral at bedtime  saccharomyces boulardii 250 milliGRAM(s) Oral two times a day  sodium chloride 0.9% Bolus 250 milliLiter(s) IV Bolus once  sodium chloride 0.9% lock flush 3 milliLiter(s) IV Push every 8 hours  spironolactone 25 milliGRAM(s) Oral daily  verapamil 120 milliGRAM(s) Oral two times a day    MEDICATIONS  (PRN):  acetaminophen     Tablet .. 975 milliGRAM(s) Oral every 8 hours PRN Mild Pain (1 - 3)  zolpidem 5 milliGRAM(s) Oral at bedtime PRN Insomnia  zolpidem 5 milliGRAM(s) Oral at bedtime PRN Insomnia    Allergies  IV DYE, IODINE CONTRAST (Unknown)  latex (Unknown)  penicillin (Hives; Urticaria)  pregabalin (Unknown)    PAST MEDICAL & SURGICAL HISTORY:  Diabetes mellitus type II, controlled  Atrial fibrillation  Congestive heart failure  Dyspnea  Morbid obesity with BMI of 40.0-44.9, adult  Neuropathy  Peripheral venous insufficiency  Thyroid nodule  HTN (hypertension)  Cellulitis  History of esophagogastroduodenoscopy (EGD)  H/O colonoscopy  Other complications of gastric band procedure  S/P left knee arthroscopy  Status post medial meniscus repair    Vital Signs Last 24 Hrs  T(C): 36.6 (20 Dec 2022 08:30), Max: 36.8 (19 Dec 2022 12:30)  T(F): 97.9 (20 Dec 2022 08:30), Max: 98.3 (19 Dec 2022 16:10)  HR: 54 (20 Dec 2022 11:25) (54 - 95)  BP: 111/56 (20 Dec 2022 11:25) (98/52 - 126/71)  BP(mean): 77 (19 Dec 2022 16:51) (77 - 77)  RR: 17 (20 Dec 2022 11:25) (10 - 21)  SpO2: 95% (20 Dec 2022 11:25) (92% - 99%)    Physical Exam:  Constitutional: NAD, AAOx3, morbidly obese  Cardiovascular: +S1S2 IRIR; Afib at time of exam; 2/6 SHAHAB  Pulmonary: CTA b/l, unlabored  GI: soft NTND +BS  Extremities: 1+ pitting edema with chronic venous stasis.   Neuro: non focal, CAPPS x4    LABS:                        10.8   7.08  )-----------( 334      ( 20 Dec 2022 04:40 )             34.7     137  |  103  |  14.9  ----------------------------<  111<H>  4.3   |  27.0  |  0.58  Ca    8.6      20 Dec 2022 04:40  Mg     1.8     12-20  PT/INR - ( 20 Dec 2022 04:40 )   PT: 16.6 sec;   INR: 1.43 ratio    PTT - ( 20 Dec 2022 04:40 )  PTT:99.0 sec    A/P  72 year old female patient, former smoker, with PMH of HFpEF (LVEF 50-55%), progressive aortic stenosis, persistent atrial fibrillation (s/p prior DCCV x 2; both unsuccessful- last ~3 years ago), HTN, HLD, DM, chronic PVD/prior vascular ulcers (followed by wound care and Dr. Yoo), and morbid obesity/prior lap band (since removed) who was sent in from Dr. George's office (Cardiology) for worsening JETT.  Recent TTE revealed worsening aortic stenosis and was instructed to come to [facility] for TAVR evaluation with Dr. Hopper.  Repeat TTE on admission revealed only moderate aortic stenosis.     Clinical presentation out of proportion to complaints  LHC performed today. No significant aortic valve gradients. Some CAD noted.  Afib is very well rate controlled on telemetry. HR remain in the 70s.   CHADSVASC: 5    - Continue rate control startegy for now. Excellent control achieved with Toprol XL  - R/LHC performed today. Awaiting official reports  - EP plan contingent on CTS - CABG vs PCI vs Medical therapy  - Continue Amiodarone  - Potential restoration of SR with ASHLEY/DCCV after all invasive procedures performed and closer to discharge.   - Again she would benefit from ablation therapy which the patient is agreeable to. Can be seen as outpatient at [facility] to re-establish care.

## 2022-12-20 NOTE — PROGRESS NOTE ADULT - SUBJECTIVE AND OBJECTIVE BOX
Brief summary:  72yFemale admitted for TAVR eval     SUBJECTIVE:  pt alert and oriented" Pt states " I feel so much better today, I think I was out of it yesterday because I got benadryl "  pt c/o of intermittent SOB, but denies any chest pain, palpitations, nausea, diarrhea, and or dysuria,     Overnight events:  no significant overnight events     PAST MEDICAL & SURGICAL HISTORY:  Diabetes mellitus type II, controlled      Atrial fibrillation      Congestive heart failure      Dyspnea      Morbid obesity with BMI of 40.0-44.9, adult      Neuropathy      Peripheral venous insufficiency      Thyroid nodule      HTN (hypertension)      Cellulitis      History of esophagogastroduodenoscopy (EGD)      H/O colonoscopy      Other complications of gastric band procedure      S/P left knee arthroscopy      Status post medial meniscus repair          MEDICATIONS  acetaminophen     Tablet .. 975 milliGRAM(s) Oral every 8 hours PRN  aMIOdarone    Tablet   Oral   aMIOdarone    Tablet 400 milliGRAM(s) Oral every 8 hours  atorvastatin 80 milliGRAM(s) Oral at bedtime  ezetimibe 10 milliGRAM(s) Oral daily  gabapentin 800 milliGRAM(s) Oral three times a day  glucagon  Injectable 1 milliGRAM(s) IntraMuscular once  heparin  Infusion.  Unit(s)/Hr IV Continuous <Continuous>  influenza  Vaccine (HIGH DOSE) 0.7 milliLiter(s) IntraMuscular once  insulin lispro (ADMELOG) corrective regimen sliding scale   SubCutaneous Before meals and at bedtime  insulin lispro Injectable (ADMELOG) 2 Unit(s) SubCutaneous three times a day before meals  meropenem Injectable 1000 milliGRAM(s) IV Push every 8 hours  metoprolol succinate  milliGRAM(s) Oral daily  montelukast 10 milliGRAM(s) Oral at bedtime  saccharomyces boulardii 250 milliGRAM(s) Oral two times a day  sodium chloride 0.9% lock flush 3 milliLiter(s) IV Push every 8 hours  spironolactone 25 milliGRAM(s) Oral daily  verapamil 120 milliGRAM(s) Oral two times a day  zolpidem 5 milliGRAM(s) Oral at bedtime PRN  zolpidem 5 milliGRAM(s) Oral at bedtime PRN  MEDICATIONS  (PRN):  acetaminophen     Tablet .. 975 milliGRAM(s) Oral every 8 hours PRN Mild Pain (1 - 3)  zolpidem 5 milliGRAM(s) Oral at bedtime PRN Insomnia  zolpidem 5 milliGRAM(s) Oral at bedtime PRN Insomnia      Daily     Daily Weight in k (19 Dec 2022 04:55)                              11.6   10.06 )-----------( 372      ( 19 Dec 2022 05:09 )             37.6       137  |  101  |  13.3  ----------------------------<  108<H>  4.0   |  25.0  |  0.52    Ca    9.0      19 Dec 2022 05:09  Mg     1.6             PTT - ( 19 Dec 2022 05:09 )  PTT:63.7 sec      Objective:  T(C): 36.4 (22 @ 20:39), Max: 38.3 (22 @ 04:55)  HR: 88 (22 @ 20:39) (65 - 95)  BP: 106/52 (22 @ 20:39) (101/68 - 126/71)  RR: 21 (22 @ 20:39) (18 - 21)  SpO2: 97% (22 @ 20:39) (97% - 99%)  Wt(kg): --CAPILLARY BLOOD GLUCOSE      POCT Blood Glucose.: 129 mg/dL (19 Dec 2022 21:19)  POCT Blood Glucose.: 168 mg/dL (19 Dec 2022 17:11)  POCT Blood Glucose.: 106 mg/dL (19 Dec 2022 12:02)  POCT Blood Glucose.: 113 mg/dL (19 Dec 2022 08:07)  I&O's Summary    18 Dec 2022 07:01  -  19 Dec 2022 07:00  --------------------------------------------------------  IN: 1220 mL / OUT: 1200 mL / NET: 20 mL    19 Dec 2022 07:01  -  20 Dec 2022 01:41  --------------------------------------------------------  IN: 360 mL / OUT: 100 mL / NET: 260 mL    Physical Exam  Neuro: A+O x 3, non-focal, speech clear and intact  HEENT:  NCAT, PERRL  Neck: supple, trachea midline  Pulm: CTA, good air entry, equal bilaterally, no rales/rhonchi/wheezing, no accessory muscle use noted  CV: irregularly irregular, +S1S2, + systolic murmur  Abd: soft, NT, ND, + BS  Ext: CAPPS x 4, b/l LE venous stasis changes, and b/l LE trace edema, 2+ DP b/l, distal motor/neuro/circ intact.   Skin: warm, dry, well perfused      Imaging:  CXR:      < from: Xray Chest 1 View- PORTABLE-Routine (Xray Chest 1 View- PORTABLE-Routine in AM.) (22 @ 07:05) >  ACC: [id number] EXAM:  XR CHEST PORTABLE ROUTINE 1V                        ACC: [id number] EXAM:  XR CHEST PORTABLE ROUTINE 1V                        ACC: [id number] EXAM:  XR CHEST PORTABLE ROUTINE 1V                          PROCEDURE DATE:  2022         INTERPRETATION:  Chest one view 2022 4:29 AM    HISTORY: Shortness of breath    COMPARISON STUDY: 2022    Frontal expiratory view of the chest shows the heart to be similar in   size. The lungs show mild pulmonary congestion and there is no evidence   of pneumothorax nor pleural effusion.    Chest one view 2022 4:48 AM  Compared to the prior study, mild pulmonary congestion is similar.    Chest one view 2022 4:55 AM  Compared to the prior study, mild pulmonary congestion is similar.      IMPRESSION:  Mild congestive changes.        Thank you for the courtesy of this referral.    --- End of Report ---            GABBI ROWLEY MD; Attending Interventional Radiologist  This document has been electronically signed. Dec 19 2022  3:25PM    < end of copied text >      ECG:    < from: 12 Lead ECG (22 @ 23:58) >  Ventricular Rate 83 BPM    Atrial Rate 76 BPM    QRS Duration 108 ms    Q-T Interval 424 ms    QTC Calculation(Bazett) 498 ms    R Axis -30 degrees    T Axis 44 degrees    Diagnosis Line Atrial fibrillation  Left axis deviation  Nonspecific ST abnormality  Abnormal ECG    Confirmed by PASCUAL CHAHAL ([id number]) on 2022 4:13:46 PM    < end of copied text >

## 2022-12-20 NOTE — PROGRESS NOTE ADULT - PROBLEM SELECTOR PLAN 1
-LVEF 50-55%, LVIDd 5.11, moderate RVE with low normal RV function  -NYHA class IIIB symptoms   -Clinically appears close to euvolemia on exam.  Elevated left sided filling pressures consistent with HFpEF diagnosis.  RA pressures not yet available to review post cath.    - no longer on Jardiance in setting of recurrent UTI.   - Start on lasix 40mg daily.   - may be able to consider Entresto therapy for her for HFpEF.    -Continue Toprol-XL 100mg daily, Verapamil 120mg BID and Spironolactone 25mg daily.  - she may be a candidate for a cardioMEMS device as an outpatient.

## 2022-12-20 NOTE — PROGRESS NOTE ADULT - SUBJECTIVE AND OBJECTIVE BOX
[facility] Advanced Heart Failure  Office: [address] [place] [address]  Telephone: [phone]/Fax: [phone]    Advanced Heart Failure - FOLLOW UP NOTE    Interval History: s/p RHC/LHC this AM. Breathing much improved.     Medications:  acetaminophen     Tablet .. 975 milliGRAM(s) Oral every 8 hours PRN  aMIOdarone    Tablet   Oral   aMIOdarone    Tablet 400 milliGRAM(s) Oral every 8 hours  atorvastatin 80 milliGRAM(s) Oral at bedtime  dextrose 50% Injectable 25 Gram(s) IV Push once  dextrose 50% Injectable 12.5 Gram(s) IV Push once  dextrose 50% Injectable 25 Gram(s) IV Push once  dextrose Oral Gel 15 Gram(s) Oral once  ezetimibe 10 milliGRAM(s) Oral daily  gabapentin 800 milliGRAM(s) Oral three times a day  glucagon  Injectable 1 milliGRAM(s) IntraMuscular once  heparin  Infusion.  Unit(s)/Hr IV Continuous <Continuous>  influenza  Vaccine (HIGH DOSE) 0.7 milliLiter(s) IntraMuscular once  insulin lispro (ADMELOG) corrective regimen sliding scale   SubCutaneous Before meals and at bedtime  insulin lispro Injectable (ADMELOG) 2 Unit(s) SubCutaneous three times a day before meals  meropenem Injectable 1000 milliGRAM(s) IV Push every 8 hours  metoprolol succinate  milliGRAM(s) Oral daily  montelukast 10 milliGRAM(s) Oral at bedtime  saccharomyces boulardii 250 milliGRAM(s) Oral two times a day  sodium chloride 0.9% Bolus 250 milliLiter(s) IV Bolus once  sodium chloride 0.9% lock flush 3 milliLiter(s) IV Push every 8 hours  spironolactone 25 milliGRAM(s) Oral daily  verapamil 120 milliGRAM(s) Oral two times a day  zolpidem 5 milliGRAM(s) Oral at bedtime PRN  zolpidem 5 milliGRAM(s) Oral at bedtime PRN      Vitals:  T(C): 36.8 (22 @ 13:00), Max: 37 (22 @ 12:30)  HR: 68 (22 @ 13:00) (54 - 95)  BP: 131/84 (22 @ 13:00) (98/52 - 131/84)  BP(mean): 77 (22 @ 16:51) (77 - 77)  RR: 18 (22 @ 13:00) (10 - 21)  SpO2: 96% (22 @ 13:00) (92% - 99%)    Daily     Daily Weight in k.4 (20 Dec 2022 05:45)        I&O's Summary    19 Dec 2022 07:01  -  20 Dec 2022 07:00  --------------------------------------------------------  IN: 600 mL / OUT: 400 mL / NET: 200 mL        Physical Exam:  Appearance: No Acute Distress  Neck: JVP around 10-12cm.  Cardiovascular: Irregularly irregular, No murmurs/rubs/gallops  Respiratory: Clear to auscultation bilaterally  Gastrointestinal: Soft, Non-tender	  Skin: No cyanosis	  Neurologic: Non-focal  Extremities: No LE edema  Psychiatry: alert    Labs:                        10.8   7.08  )-----------( 334      ( 20 Dec 2022 04:40 )             34.7     12-20    137  |  103  |  14.9  ----------------------------<  111<H>  4.3   |  27.0  |  0.58    Ca    8.6      20 Dec 2022 04:40  Mg     1.8     12-20      PT/INR - ( 20 Dec 2022 04:40 )   PT: 16.6 sec;   INR: 1.43 ratio         PTT - ( 20 Dec 2022 04:40 )  PTT:99.0 sec      TELEMETRY: no acute events.

## 2022-12-20 NOTE — PROGRESS NOTE ADULT - ASSESSMENT
72 year old female former smoker with PMH of morbid obesity, Aortic Stenosis, HTN, HLD, type 2 DM (HA1c 6.3 on Metformin and Farxiga), LE cellulitis, bacteremia, recently admitted to [facility] 11/30-12/8 of ESBL E.Coli and Proteus UTI, lap band (since removed), chronic LE venous stasis with vascular ulcers (followed by Dr. Yoo), presented from home 12/14/22 with complaints of worsening JETT with light exertion and lower extremity edema with associated palpitations. Patient follows with Cardiologist Dr. Mayer and was advised to seek medical attention for recent outpatient TTE demonstrating severe aortic stenosis. Patient admitted to CT Surgery service under Dr. Hopper for TAVR evaluation. Repeat TTE on admission showing Moderate Aortic stenosis with an ADRIAN of 1.1.

## 2022-12-20 NOTE — PROGRESS NOTE ADULT - PROBLEM SELECTOR PLAN 1
Patient with Severe AS on outpatient TTE with associated JETT.    Admitted for TAVR evaluation and chronic diastolic heart failure exacerbation.  Repeat TTE showing Moderate AS with an ADRIAN of 1.1.   Heart failure team following.   Diuretics held at this time per heart failure as patient intravascularly dry, with only trace edema.  Continue Toprol as tolerated by HR and BP.   Continue Aldactone and Farxiga for heart failure regimen.   Recommending Entresto as tolerated by SBP.   Patient unable to lie flat due to respiratory status.   Pulmonology following.   Plan for RHC/ LHC cancelled 12/19 2/2 to pt being febrile tmax 102, may beable to reschedule 12 /20 if patient remains afebrile   pending cath result possible ASHLEY/ DCCV    Plan to be discussed / reviewed with CT Surgery attending / team during AM rounds.

## 2022-12-20 NOTE — PROGRESS NOTE ADULT - ASSESSMENT
HF Attending: Dr. Stevens   Primary Cardiologist: Dr. Mayer ([place])    71 y/o female, former smoker, with HFpEF (LVEF 50-55%), aortic stenosis, atrial fibrillation, HTN, HLD, DM (controlled on Metformin), chronic PVD/prior vascular ulcers (followed by Dr. Yoo), morbid obesity/prior lap band (since removed).  Patient endorses progressive dyspnea since September/October. She had a recent prolonged hospitalization at  where she was treated for cellulitis and bacteremia. She was discharged home Friday 12/9 and presented to her primary cardiologist's office for f/u on 12/13 where she was noted to have significant SOB and was referred to [facility].  She has been treated with intermittent IV diuresis and notes significant improvement in LE edema. However, she continues to endorse orthopnea and was only able to walk ~10 feet with PT today due to SOB. Also being treated with Merrem for Ecoli ESBL in urine. She was febrile 12/18 temp 102.2 without leukocytosis.     CTA negative for  PE on 11/29/22.  Admission labwork remarkable for serum pBNP 1023, INR 4. Negative trops.    Cardiac Testing  TTE 12/14/22: LVEF 50-55%, LVIDd 5.11cm, IVSd 1.2cm, mild LAE, severe AGUILA, moderate RVE with low/normal RV function, mild AI, moderate AS (MG 23, PV 3.02m/s), mild MR, moderate TR, PASP 41.4 mmHg (RAP 3 mmHg).  RHC: full report pending, RA ?, PA 63/32 (46), PCWP 18, CO 6.4/ CI 2.98, PA sat 53%.  LHC: full report pending

## 2022-12-20 NOTE — CONSULT NOTE ADULT - PROVIDER SPECIALTY LIST ADULT
Intervent Cardiology
Infectious Disease
Vascular Surgery
Electrophysiology
Intervent Cardiology
Heart Failure

## 2022-12-20 NOTE — CONSULT NOTE ADULT - NS ATTEND AMEND GEN_ALL_CORE FT
Indicated for coronary angiogram due to progressive dyspnea ( possibly angina equivalent)   moderate AS on echo
Has long-standing persistent AF and moderate aortic stenosis per TTE. Symptoms felt to be out of proportion to severity of AS but TAVR being considered after ASHLEY to further assess severity of AS. Recommend ASHLEY/DCCV after amiodarone loading. Would only do cardioversion if no inpatient TAVR planned since that would require interruption of anticoagulation post-cardioversion. Should be on AC uninterrupted for at least 4 weeks post-cardioversion. Overall, chance regain sinus rhythm appear to be low given long-standing persistent AF, a severely dilated LA and hx of two failed cardioversions in the past.
Patient was seen and examined at bedside with the advanced care provider.    Briefly, Ms. Kirby is a 73 yo F with progressive aortic stenosis and HFpEF (LVEF 50-55%) presented to [facility] on 12/14/22 from [facility] for consideration of valve intervention.  She has had 2 recent hospitalizations for progressive dyspnea.  She was previously able to work at [employer] for 10 hours per week.  Now she is barely able to walk a few steps.  Of note, she has had afib for over 5 years. Recently she was switched from diltiazem to verapamil due to insurance costs.  Today she is in afib.  Echocardiogram revealed only moderate AS (MG 23, PV 3.20m/s) with LVEF 50%. Pro BNP on admission is 931.  On examination she appears relatively euvolemic with JVP at 8cm and trace LE edema.  Would favor stopping diuretics at this time and resuming home Farxiga therapy.  Start on spironolactone 25mg daily for HFpEF therapy.  Can also consider Entresto if BP tolerant.  Agree with plans for RHC/LHC and possible ASHLEY.  Would also have EP see her for evaluation of afib and if cardioversion is warranted.  She notes that she is extremely symptomatic when in afib and felt as those she was in it now.

## 2022-12-20 NOTE — PROGRESS NOTE ADULT - PROBLEM SELECTOR PLAN 2
-Only moderate on TTE (ADRIAN 1.1, PPG 36.5, DI 0.36) and no significant gradient on LHC today.  - could consider dobutamine stress echo as an outpatient if still symptomatic.

## 2022-12-21 DIAGNOSIS — Z29.9 ENCOUNTER FOR PROPHYLACTIC MEASURES, UNSPECIFIED: ICD-10-CM

## 2022-12-21 DIAGNOSIS — N39.0 URINARY TRACT INFECTION, SITE NOT SPECIFIED: ICD-10-CM

## 2022-12-21 LAB
ANION GAP SERPL CALC-SCNC: 10 MMOL/L — SIGNIFICANT CHANGE UP (ref 5–17)
APTT BLD: 118.2 SEC — HIGH (ref 27.5–35.5)
APTT BLD: 71 SEC — HIGH (ref 27.5–35.5)
APTT BLD: 77.4 SEC — HIGH (ref 27.5–35.5)
BUN SERPL-MCNC: 16.6 MG/DL — SIGNIFICANT CHANGE UP (ref 8–20)
CALCIUM SERPL-MCNC: 8.5 MG/DL — SIGNIFICANT CHANGE UP (ref 8.4–10.5)
CHLORIDE SERPL-SCNC: 101 MMOL/L — SIGNIFICANT CHANGE UP (ref 96–108)
CO2 SERPL-SCNC: 27 MMOL/L — SIGNIFICANT CHANGE UP (ref 22–29)
CREAT SERPL-MCNC: 0.52 MG/DL — SIGNIFICANT CHANGE UP (ref 0.5–1.3)
EGFR: 98 ML/MIN/1.73M2 — SIGNIFICANT CHANGE UP
GLUCOSE BLDC GLUCOMTR-MCNC: 116 MG/DL — HIGH (ref 70–99)
GLUCOSE BLDC GLUCOMTR-MCNC: 168 MG/DL — HIGH (ref 70–99)
GLUCOSE BLDC GLUCOMTR-MCNC: 197 MG/DL — HIGH (ref 70–99)
GLUCOSE SERPL-MCNC: 109 MG/DL — HIGH (ref 70–99)
HCT VFR BLD CALC: 33.9 % — LOW (ref 34.5–45)
HGB BLD-MCNC: 10.8 G/DL — LOW (ref 11.5–15.5)
MAGNESIUM SERPL-MCNC: 1.9 MG/DL — SIGNIFICANT CHANGE UP (ref 1.6–2.6)
MCHC RBC-ENTMCNC: 29.8 PG — SIGNIFICANT CHANGE UP (ref 27–34)
MCHC RBC-ENTMCNC: 31.9 GM/DL — LOW (ref 32–36)
MCV RBC AUTO: 93.6 FL — SIGNIFICANT CHANGE UP (ref 80–100)
PLATELET # BLD AUTO: 320 K/UL — SIGNIFICANT CHANGE UP (ref 150–400)
POTASSIUM SERPL-MCNC: 4 MMOL/L — SIGNIFICANT CHANGE UP (ref 3.5–5.3)
POTASSIUM SERPL-SCNC: 4 MMOL/L — SIGNIFICANT CHANGE UP (ref 3.5–5.3)
RBC # BLD: 3.62 M/UL — LOW (ref 3.8–5.2)
RBC # FLD: 13.6 % — SIGNIFICANT CHANGE UP (ref 10.3–14.5)
SODIUM SERPL-SCNC: 138 MMOL/L — SIGNIFICANT CHANGE UP (ref 135–145)
WBC # BLD: 7.53 K/UL — SIGNIFICANT CHANGE UP (ref 3.8–10.5)
WBC # FLD AUTO: 7.53 K/UL — SIGNIFICANT CHANGE UP (ref 3.8–10.5)

## 2022-12-21 PROCEDURE — 93312 ECHO TRANSESOPHAGEAL: CPT | Mod: 26

## 2022-12-21 PROCEDURE — 93010 ELECTROCARDIOGRAM REPORT: CPT

## 2022-12-21 PROCEDURE — 93320 DOPPLER ECHO COMPLETE: CPT | Mod: 26

## 2022-12-21 PROCEDURE — 99233 SBSQ HOSP IP/OBS HIGH 50: CPT

## 2022-12-21 PROCEDURE — 76376 3D RENDER W/INTRP POSTPROCES: CPT | Mod: 26

## 2022-12-21 PROCEDURE — 99223 1ST HOSP IP/OBS HIGH 75: CPT | Mod: 25

## 2022-12-21 PROCEDURE — 93325 DOPPLER ECHO COLOR FLOW MAPG: CPT | Mod: 26

## 2022-12-21 PROCEDURE — 99232 SBSQ HOSP IP/OBS MODERATE 35: CPT

## 2022-12-21 PROCEDURE — 71045 X-RAY EXAM CHEST 1 VIEW: CPT | Mod: 26

## 2022-12-21 RX ADMIN — Medication 120 MILLIGRAM(S): at 06:51

## 2022-12-21 RX ADMIN — HEPARIN SODIUM 1800 UNIT(S)/HR: 5000 INJECTION INTRAVENOUS; SUBCUTANEOUS at 22:49

## 2022-12-21 RX ADMIN — EZETIMIBE 10 MILLIGRAM(S): 10 TABLET ORAL at 10:14

## 2022-12-21 RX ADMIN — ZOLPIDEM TARTRATE 5 MILLIGRAM(S): 10 TABLET ORAL at 22:17

## 2022-12-21 RX ADMIN — GABAPENTIN 800 MILLIGRAM(S): 400 CAPSULE ORAL at 06:51

## 2022-12-21 RX ADMIN — SODIUM CHLORIDE 3 MILLILITER(S): 9 INJECTION INTRAMUSCULAR; INTRAVENOUS; SUBCUTANEOUS at 14:21

## 2022-12-21 RX ADMIN — ATORVASTATIN CALCIUM 80 MILLIGRAM(S): 80 TABLET, FILM COATED ORAL at 22:17

## 2022-12-21 RX ADMIN — AMIODARONE HYDROCHLORIDE 400 MILLIGRAM(S): 400 TABLET ORAL at 06:52

## 2022-12-21 RX ADMIN — MEROPENEM 1000 MILLIGRAM(S): 1 INJECTION INTRAVENOUS at 06:52

## 2022-12-21 RX ADMIN — HEPARIN SODIUM 1800 UNIT(S)/HR: 5000 INJECTION INTRAVENOUS; SUBCUTANEOUS at 07:59

## 2022-12-21 RX ADMIN — SPIRONOLACTONE 25 MILLIGRAM(S): 25 TABLET, FILM COATED ORAL at 10:14

## 2022-12-21 RX ADMIN — Medication 2: at 17:17

## 2022-12-21 RX ADMIN — Medication 250 MILLIGRAM(S): at 17:13

## 2022-12-21 RX ADMIN — SODIUM CHLORIDE 3 MILLILITER(S): 9 INJECTION INTRAMUSCULAR; INTRAVENOUS; SUBCUTANEOUS at 22:27

## 2022-12-21 RX ADMIN — GABAPENTIN 800 MILLIGRAM(S): 400 CAPSULE ORAL at 22:17

## 2022-12-21 RX ADMIN — MEROPENEM 1000 MILLIGRAM(S): 1 INJECTION INTRAVENOUS at 22:18

## 2022-12-21 RX ADMIN — Medication 120 MILLIGRAM(S): at 17:13

## 2022-12-21 RX ADMIN — MEROPENEM 1000 MILLIGRAM(S): 1 INJECTION INTRAVENOUS at 15:16

## 2022-12-21 RX ADMIN — MONTELUKAST 10 MILLIGRAM(S): 4 TABLET, CHEWABLE ORAL at 22:17

## 2022-12-21 RX ADMIN — GABAPENTIN 800 MILLIGRAM(S): 400 CAPSULE ORAL at 15:16

## 2022-12-21 RX ADMIN — Medication 40 MILLIGRAM(S): at 10:14

## 2022-12-21 RX ADMIN — Medication 100 MILLIGRAM(S): at 06:52

## 2022-12-21 RX ADMIN — Medication 2: at 22:20

## 2022-12-21 RX ADMIN — HEPARIN SODIUM 1800 UNIT(S)/HR: 5000 INJECTION INTRAVENOUS; SUBCUTANEOUS at 19:37

## 2022-12-21 RX ADMIN — SODIUM CHLORIDE 3 MILLILITER(S): 9 INJECTION INTRAMUSCULAR; INTRAVENOUS; SUBCUTANEOUS at 07:01

## 2022-12-21 RX ADMIN — HEPARIN SODIUM 1800 UNIT(S)/HR: 5000 INJECTION INTRAVENOUS; SUBCUTANEOUS at 09:22

## 2022-12-21 RX ADMIN — Medication 250 MILLIGRAM(S): at 06:52

## 2022-12-21 RX ADMIN — HEPARIN SODIUM 1800 UNIT(S)/HR: 5000 INJECTION INTRAVENOUS; SUBCUTANEOUS at 16:12

## 2022-12-21 NOTE — PROGRESS NOTE ADULT - NS ATTEND AMEND GEN_ALL_CORE FT
.  .  Patient s/p ASHLEY which demonstrated severe AS and smoke in ECHO but no thrombus. F/up with CTS team regarding potential need for TAVR or other intervention given presence of severe AS. If no indication, then plan for cardioversion on 12/22.    Valente Oliver MD  Clinical Cardiac Electrophysiology

## 2022-12-21 NOTE — PROGRESS NOTE ADULT - ASSESSMENT
Assessment:   72 year old female, former smoker, with PMH of HFpEF (LVEF 50-55%), progressive aortic stenosis, persistent atrial fibrillation (s/p prior DCCV x 2; both unsuccessful- last ~3 years ago), HTN, hyperlipidemia, DM type 2, chronic PVD/prior vascular ulcers (followed by wound care and Dr. Yoo), and morbid obesity/prior lap band (since removed). She recently had worsening JETT with light exertion and lower extremity edema. Outpatient TTE showed worsening aortic stenosis and she was referred to [facility] by Dr. George for further evaluation and consideration for TAVR. Repeat echo demonstrated moderate AS, LHC on 12/20 showed no significant aortic valve gradients, some CAD noted. She is now s/p uncomplicated ASHLEY which shows severe AS, official report pending.  She remains in AFib with controlled rates in the 70s.     Plan:   Observation on telemetry per post op protocol.    Resume PO intake.   Ambulate w/ assist once fully awake & back to baseline mental status w/ VSS.  Continue amiodarone 200mg daily.   Continue metoprolol 100mg daily.   Hold verapamil for 24 hrs.   Continue heparin gtt for now. PT/PTT as per protocol.   CTSx to review ASHLEY imaging.  Will consider cardioversion on 12/22 if no indication for TAVR/surgical intervention, will awaiting CTSx input.   Will follow.     Seen and discussed with Dr. Oliver (covering EP attending)

## 2022-12-21 NOTE — PROGRESS NOTE ADULT - SUBJECTIVE AND OBJECTIVE BOX
INFECTIOUS DISEASES AND INTERNAL MEDICINE at [place]  =======================================================  Gary Browning MD  Diplomates American Board of Internal Medicine and Infectious Diseases  Telephone [phone]  Fax            [phone]  =======================================================    ANGELINA RAMIREZ [id number]    Follow up: ESBL    Allergies:  IV DYE, IODINE CONTRAST (Unknown)  latex (Unknown)  penicillin (Hives; Urticaria)  pregabalin (Unknown)      Medications:  acetaminophen     Tablet .. 975 milliGRAM(s) Oral every 8 hours PRN  albuterol    90 MICROgram(s) HFA Inhaler 2 Puff(s) Inhalation every 6 hours PRN  albuterol/ipratropium for Nebulization 3 milliLiter(s) Nebulizer every 6 hours  aMIOdarone    Tablet   Oral   aMIOdarone    Tablet 400 milliGRAM(s) Oral every 8 hours  atorvastatin 80 milliGRAM(s) Oral at bedtime  dextrose 50% Injectable 25 Gram(s) IV Push once  dextrose 50% Injectable 12.5 Gram(s) IV Push once  dextrose 50% Injectable 25 Gram(s) IV Push once  dextrose Oral Gel 15 Gram(s) Oral once  ezetimibe 10 milliGRAM(s) Oral daily  gabapentin 800 milliGRAM(s) Oral three times a day  glucagon  Injectable 1 milliGRAM(s) IntraMuscular once  heparin  Infusion.  Unit(s)/Hr IV Continuous <Continuous>  influenza  Vaccine (HIGH DOSE) 0.7 milliLiter(s) IntraMuscular once  insulin lispro (ADMELOG) corrective regimen sliding scale   SubCutaneous Before meals and at bedtime  insulin lispro Injectable (ADMELOG) 2 Unit(s) SubCutaneous three times a day before meals  meropenem Injectable 1000 milliGRAM(s) IV Push every 8 hours  metoprolol succinate  milliGRAM(s) Oral daily  montelukast 10 milliGRAM(s) Oral at bedtime  saccharomyces boulardii 250 milliGRAM(s) Oral two times a day  sodium chloride 0.9% lock flush 3 milliLiter(s) IV Push every 8 hours  spironolactone 25 milliGRAM(s) Oral daily  verapamil 120 milliGRAM(s) Oral two times a day  zolpidem 5 milliGRAM(s) Oral at bedtime PRN  zolpidem 5 milliGRAM(s) Oral at bedtime PRN    SOCIAL       FAMILY   FAMILY HISTORY:  Family history of breast cancer in mother    Family history of diabetes mellitus in mother      REVIEW OF SYSTEMS:  CONSTITUTIONAL:  No Fever or chills  HEENT:   No diplopia or blurred vision.  No earache, sore throat or runny nose.  CARDIOVASCULAR:  No pressure, squeezing, strangling, tightness, heaviness or aching about the chest, neck, axilla or epigastrium.  RESPIRATORY:  No cough, shortness of breath, PND or orthopnea.  GASTROINTESTINAL:  No nausea, vomiting or diarrhea.  GENITOURINARY:  No dysuria, frequency or urgency. No Blood in urine  MUSCULOSKELETAL:   moves all joints  SKIN:  No change in skin, hair or nails.  NEUROLOGIC:  No paresthesias, fasciculations, seizures or weakness.  PSYCHIATRIC:  No disorder of thought or mood.  ENDOCRINE:  No heat or cold intolerance, polyuria or polydipsia.  HEMATOLOGICAL:  No easy bruising or bleeding.            Physical Exam:  I Vital Signs Last 24 Hrs  T(C): 36.4 (21 Dec 2022 06:30), Max: 37 (20 Dec 2022 12:30)  T(F): 97.6 (21 Dec 2022 06:30), Max: 98.6 (20 Dec 2022 12:30)  HR: 75 (21 Dec 2022 06:30) (54 - 83)  BP: 133/85 (21 Dec 2022 06:30) (100/51 - 144/92)  BP(mean): --  RR: 18 (21 Dec 2022 06:30) (10 - 18)  SpO2: 97% (21 Dec 2022 06:30) (92% - 98%)    Parameters below as of 21 Dec 2022 06:30  Patient On (Oxygen Delivery Method): nasal cannula  O2 Flow (L/min): 2        GEN: NAD,   HEENT: normocephalic and atraumatic. EOMI. JORDAN.    NECK: Supple. No carotid bruits.  No lymphadenopathy or thyromegaly.  LUNGS: Clear to auscultation.  HEART: Regular rate and rhythm without murmur.  ABDOMEN: Soft, nontender, and nondistended.  Positive bowel sounds.    : No CVA tenderness  EXTREMITIES: Without any cyanosis, clubbing, rash, lesions or edema.  MSK: no joint swelling  NEUROLOGIC: Cranial nerves II through XII are grossly intact.  PSYCHIATRIC: Appropriate affect .  SKIN: No ulceration or induration present.        Labs:  Vitals:  =   =======================================================  Current Antibiotics:  meropenem Injectable 1000 milliGRAM(s) IV Push every 8 hours    Other medications:  albuterol/ipratropium for Nebulization 3 milliLiter(s) Nebulizer every 6 hours  aMIOdarone    Tablet   Oral   aMIOdarone    Tablet 400 milliGRAM(s) Oral every 8 hours  atorvastatin 80 milliGRAM(s) Oral at bedtime  dextrose 50% Injectable 25 Gram(s) IV Push once  dextrose 50% Injectable 12.5 Gram(s) IV Push once  dextrose 50% Injectable 25 Gram(s) IV Push once  dextrose Oral Gel 15 Gram(s) Oral once  ezetimibe 10 milliGRAM(s) Oral daily  gabapentin 800 milliGRAM(s) Oral three times a day  glucagon  Injectable 1 milliGRAM(s) IntraMuscular once  heparin  Infusion.  Unit(s)/Hr IV Continuous <Continuous>  influenza  Vaccine (HIGH DOSE) 0.7 milliLiter(s) IntraMuscular once  insulin lispro (ADMELOG) corrective regimen sliding scale   SubCutaneous Before meals and at bedtime  insulin lispro Injectable (ADMELOG) 2 Unit(s) SubCutaneous three times a day before meals  metoprolol succinate  milliGRAM(s) Oral daily  montelukast 10 milliGRAM(s) Oral at bedtime  saccharomyces boulardii 250 milliGRAM(s) Oral two times a day  sodium chloride 0.9% lock flush 3 milliLiter(s) IV Push every 8 hours  spironolactone 25 milliGRAM(s) Oral daily  verapamil 120 milliGRAM(s) Oral two times a day      =======================================================  Labs:                                             10.8   7.53  )-----------( 320      ( 21 Dec 2022 05:50 )             33.9   12-21    138  |  101  |  16.6  ----------------------------<  109<H>  4.0   |  27.0  |  0.52    Ca    8.5      21 Dec 2022 05:50  Mg     1.9     12-21        Culture - Urine (collected 12-15-22 @ 17:50)  Source: Clean Catch Clean Catch (Midstream)  Final Report (12-18-22 @ 20:31):    >100,000 CFU/ml Escherichia coli ESBL    <10,000 CFU/ml Normal Urogenital cherie present  Organism: Escherichia coli ESBL (12-18-22 @ 20:31)  Organism: Escherichia coli ESBL (12-18-22 @ 20:31)    Sensitivities:      -  Amikacin: S <=16      -  Amoxicillin/Clavulanic Acid: S <=8/4      -  Ampicillin: R >16 These ampicillin results predict results for amoxicillin      -  Ampicillin/Sulbactam: I 16/8 Enterobacter, Klebsiella aerogenes, Citrobacter, and Serratia may develop resistance during prolonged therapy (3-4 days)      -  Aztreonam: R >16      -  Cefazolin: R >16 For uncomplicated UTI with K. pneumoniae, E. coli, or P. mirablis: TOYA <=16 is sensitive and TOYA >=32 is resistant. This also predicts results for oral agents cefaclor, cefdinir, cefpodoxime, cefprozil, cefuroxime axetil, cephalexin and locarbef for uncomplicated UTI. Note that some isolates may be susceptible to these agents while testing resistant to cefazolin.      -  Cefepime: R >16      -  Cefoxitin: S <=8      -  Ceftriaxone: R >32 Enterobacter, Klebsiella aerogenes, Citrobacter, and Serratia may develop resistance during prolonged therapy      -  Ciprofloxacin: R >2      -  Ertapenem: S <=0.5      -  Gentamicin: S <=2      -  Imipenem: S <=1      -  Levofloxacin: R >4      -  Meropenem: S <=1      -  Nitrofurantoin: S <=32 Should not be used to treat pyelonephritis      -  Piperacillin/Tazobactam: S <=8      -  Tobramycin: S <=2      -  Trimethoprim/Sulfamethoxazole: S <=0.5/9.5      Method Type: TOYA      Creatinine, Serum: 0.52 mg/dL (12-19-22 @ 05:09)  Creatinine, Serum: 0.54 mg/dL (12-18-22 @ 06:00)  Creatinine, Serum: 0.60 mg/dL (12-17-22 @ 07:33)  Creatinine, Serum: 0.62 mg/dL (12-16-22 @ 06:47)  Creatinine, Serum: 0.58 mg/dL (12-15-22 @ 06:34)  Creatinine, Serum: 0.59 mg/dL (12-14-22 @ 21:30)  Creatinine, Serum: 0.57 mg/dL (12-14-22 @ 18:00)            WBC Count: 10.06 K/uL (12-19-22 @ 05:09)  WBC Count: 9.73 K/uL (12-18-22 @ 09:39)  WBC Count: 9.04 K/uL (12-18-22 @ 06:00)  WBC Count: 9.38 K/uL (12-17-22 @ 22:35)  WBC Count: 9.56 K/uL (12-17-22 @ 07:33)  WBC Count: 11.04 K/uL (12-16-22 @ 06:47)  WBC Count: 7.89 K/uL (12-15-22 @ 06:34)  WBC Count: 9.74 K/uL (12-14-22 @ 21:30)  WBC Count: 11.38 K/uL (12-14-22 @ 18:00)    SARS-CoV-2: NotDetec (12-18-22 @ 15:06)  COVID-19 PCR: NotDetec (12-18-22 @ 00:45)  SARS-CoV-2: NotDetec (12-14-22 @ 20:06)  COVID-19 PCR: NotDetec (12-01-22 @ 11:00)  SARS-CoV-2: NotDetec (11-29-22 @ 18:24)

## 2022-12-21 NOTE — PROGRESS NOTE ADULT - SUBJECTIVE AND OBJECTIVE BOX
[facility] Advanced Heart Failure  Office: [address] [place] [address]  Telephone: [phone]/Fax: [phone]    Advanced Heart Failure - FOLLOW UP NOTE    Interval History: no acute events.  Planned for ASHLEY/DCCV today.    Medications:  acetaminophen     Tablet .. 975 milliGRAM(s) Oral every 8 hours PRN  aMIOdarone    Tablet   Oral   aMIOdarone    Tablet 200 milliGRAM(s) Oral daily  atorvastatin 80 milliGRAM(s) Oral at bedtime  dextrose 50% Injectable 25 Gram(s) IV Push once  dextrose 50% Injectable 12.5 Gram(s) IV Push once  dextrose 50% Injectable 25 Gram(s) IV Push once  dextrose Oral Gel 15 Gram(s) Oral once  ezetimibe 10 milliGRAM(s) Oral daily  furosemide    Tablet 40 milliGRAM(s) Oral daily  gabapentin 800 milliGRAM(s) Oral three times a day  glucagon  Injectable 1 milliGRAM(s) IntraMuscular once  heparin  Infusion.  Unit(s)/Hr IV Continuous <Continuous>  influenza  Vaccine (HIGH DOSE) 0.7 milliLiter(s) IntraMuscular once  insulin lispro (ADMELOG) corrective regimen sliding scale   SubCutaneous Before meals and at bedtime  insulin lispro Injectable (ADMELOG) 2 Unit(s) SubCutaneous three times a day before meals  meropenem Injectable 1000 milliGRAM(s) IV Push every 8 hours  metoprolol succinate  milliGRAM(s) Oral daily  montelukast 10 milliGRAM(s) Oral at bedtime  saccharomyces boulardii 250 milliGRAM(s) Oral two times a day  sodium chloride 0.9% lock flush 3 milliLiter(s) IV Push every 8 hours  spironolactone 25 milliGRAM(s) Oral daily  verapamil 120 milliGRAM(s) Oral two times a day  zolpidem 5 milliGRAM(s) Oral at bedtime PRN  zolpidem 5 milliGRAM(s) Oral at bedtime PRN      Vitals:  T(C): 36.6 (12-21-22 @ 11:15), Max: 37 (12-20-22 @ 12:30)  HR: 58 (12-21-22 @ 11:15) (58 - 83)  BP: 148/83 (12-21-22 @ 11:15) (104/65 - 148/83)  BP(mean): --  RR: 17 (12-21-22 @ 11:15) (17 - 18)  SpO2: 98% (12-21-22 @ 11:15) (93% - 98%)      I&O's Summary    20 Dec 2022 07:01  -  21 Dec 2022 07:00  --------------------------------------------------------  IN: 830 mL / OUT: 660 mL / NET: 170 mL      Physical Exam:  Appearance: No Acute Distress  Neck: JVP around 8-10cm.    Cardiovascular: irregularly irregular, No murmurs/rubs/gallops  Respiratory: Clear to auscultation bilaterally  Gastrointestinal: Soft, Non-tender	  Skin: No cyanosis	  Neurologic: Non-focal  Extremities: in chair today with increase left lower extremity dependent edema.    Psychiatry: alert    Labs:                        10.8   7.53  )-----------( 320      ( 21 Dec 2022 05:50 )             33.9     12-21    138  |  101  |  16.6  ----------------------------<  109<H>  4.0   |  27.0  |  0.52    Ca    8.5      21 Dec 2022 05:50  Mg     1.9     12-21      PT/INR - ( 20 Dec 2022 04:40 )   PT: 16.6 sec;   INR: 1.43 ratio    PTT - ( 21 Dec 2022 05:50 )  PTT:118.2 sec    TELEMETRY: afib with HR 60s, decrease hr to 40s overnight.

## 2022-12-21 NOTE — PROGRESS NOTE ADULT - ASSESSMENT
72 year old female former smoker with PMH of morbid obesity, Aortic Stenosis, HTN, HLD, type 2 DM (HA1c 6.3 on Metformin and Farxiga), LE cellulitis, bacteremia, recently admitted to [facility] 11/30-12/8 of ESBL E.Coli and Proteus UTI, lap band (since removed), chronic LE venous stasis with vascular ulcers (followed by Dr. Yoo), presented from home 12/14/22 with complaints of worsening JETT with light exertion and lower extremity edema with associated palpitations. Patient follows with Cardiologist Dr. Mayer and was advised to seek medical attention for recent outpatient TTE demonstrating severe aortic stenosis. Patient admitted to CT Surgery service under Dr. Hopper for TAVR evaluation. Repeat TTE on admission showing Moderate Aortic stenosis with an ADRIAN of 1.1.

## 2022-12-21 NOTE — PROGRESS NOTE ADULT - PROBLEM SELECTOR PLAN 3
-Continue Verapamil for now (recently switched from Diltiazem due to cost).  -EP following. Started on amio load.  Planned for DCCV today.  -Maintain K>4, Mg>2  -AC: heparin gtt

## 2022-12-21 NOTE — PROGRESS NOTE ADULT - PROBLEM SELECTOR PLAN 1
-LVEF 50-55%, LVIDd 5.11, moderate RVE with low normal RV function  -NYHA class IIIB symptoms   -Clinically appears close to euvolemia on exam.  Elevated left sided filling pressures consistent with HFpEF diagnosis.  RA pressures not yet available to review post cath.    - no longer on Jardiance in setting of recurrent UTI.   - diuretics: on lasix 40mg daily.   - may be able to consider Entresto therapy for her for HFpEF.  Will re-evaluate her BP after cardioversion.   -Continue Toprol-XL 100mg daily, Verapamil 120mg BID and Spironolactone 25mg daily.  - she may be a candidate for a cardioMEMS device as an outpatient.

## 2022-12-21 NOTE — PROGRESS NOTE ADULT - SUBJECTIVE AND OBJECTIVE BOX
Pt doing well s/p uncomplicated ASHLEY.     PAST MEDICAL & SURGICAL HISTORY:  Diabetes mellitus type II, controlled  Atrial fibrillation  Congestive heart failure  Dyspnea  Morbid obesity with BMI of 40.0-44.9, adult  Neuropathy  Peripheral venous insufficiency  Thyroid nodule  HTN (hypertension)  Cellulitis  History of esophagogastroduodenoscopy (EGD)  H/O colonoscopy  Other complications of gastric band procedure  S/P left knee arthroscopy  Status post medial meniscus repair    MEDICATIONS  (STANDING):  aMIOdarone    Tablet   Oral   aMIOdarone    Tablet 200 milliGRAM(s) Oral daily  atorvastatin 80 milliGRAM(s) Oral at bedtime  dextrose 50% Injectable 25 Gram(s) IV Push once  dextrose 50% Injectable 12.5 Gram(s) IV Push once  dextrose 50% Injectable 25 Gram(s) IV Push once  dextrose Oral Gel 15 Gram(s) Oral once  ezetimibe 10 milliGRAM(s) Oral daily  furosemide    Tablet 40 milliGRAM(s) Oral daily  gabapentin 800 milliGRAM(s) Oral three times a day  glucagon  Injectable 1 milliGRAM(s) IntraMuscular once  heparin  Infusion.  Unit(s)/Hr (20 mL/Hr) IV Continuous <Continuous>  influenza  Vaccine (HIGH DOSE) 0.7 milliLiter(s) IntraMuscular once  insulin lispro (ADMELOG) corrective regimen sliding scale   SubCutaneous Before meals and at bedtime  insulin lispro Injectable (ADMELOG) 2 Unit(s) SubCutaneous three times a day before meals  meropenem Injectable 1000 milliGRAM(s) IV Push every 8 hours  metoprolol succinate  milliGRAM(s) Oral daily  montelukast 10 milliGRAM(s) Oral at bedtime  saccharomyces boulardii 250 milliGRAM(s) Oral two times a day  sodium chloride 0.9% lock flush 3 milliLiter(s) IV Push every 8 hours  spironolactone 25 milliGRAM(s) Oral daily  verapamil 120 milliGRAM(s) Oral two times a day    MEDICATIONS  (PRN):  acetaminophen     Tablet .. 975 milliGRAM(s) Oral every 8 hours PRN Mild Pain (1 - 3)  zolpidem 5 milliGRAM(s) Oral at bedtime PRN Insomnia  zolpidem 5 milliGRAM(s) Oral at bedtime PRN Insomnia    Post-procedure VS: /60 HR 79 O2 sat 98%   awake, alert, no obvious distress   Card: S1/S2 irregular   Resp: lungs CTA b/l  Abd: S/NT/ND  Ext: no edema, distal pulses intact    Echo: 12/14/22:   Summary:   1. Technically difficult study.   2. Left ventricular ejection fraction, by visual estimation, is 50 to 55%.   3. LV Ejection Fraction by Ramirez's Method with a biplane EF of 49 %.   4. Normal global left ventricular systolic function.   5. There is moderate left ventricular hypertrophy.   6. The mitral in-flow pattern reveals no discernable A-wave, therefore no comment on diastolic function can be made.   7. Moderately enlarged right ventricle.   8. Mild mitral valve regurgitation.   9. Mild aortic regurgitation.  10. Moderate aortic valve stenosis.  11. Estimated pulmonary artery systolic pressure is 41.4 mmHg assuming a right atrial pressure of 3 mmHg, which is consistent with mild pulmonary hypertension.  Godfrey Banda MD Electronically signed on 12/14/2022 at 10:26:31 PM

## 2022-12-21 NOTE — PROGRESS NOTE ADULT - PROBLEM SELECTOR PLAN 3
HA1c 6.3 on Metformin and Farxiga as an outpatient.   Holding oral antihyperglycemic in inpatient setting.  Fingersticks AC/HS with ISS ordered for BG coverage.   Consider endocrine consult if needed.  Farxiga now held due to UTI as per Heart failure.

## 2022-12-21 NOTE — PROGRESS NOTE ADULT - PROBLEM SELECTOR PLAN 1
Patient with Severe AS on outpatient TTE with associated JETT.    Admitted for TAVR evaluation and chronic diastolic heart failure exacerbation.  Repeat TTE showing Moderate AS with an ADRIAN of 1.1.   Patient with dyspnea and new O2 requirements since admission.   Pulmonology following.   Heart failure team following.   Patient appears euvolemic.   Lasix 40PO QD and Aldactone for Heart failure regimen.    Continue Toprol as tolerated by HR and BP.   S/p RHC/ LHC 12/20/22 revealing PCW 28mmHg, PA 63/32 with mean 46mmHg, PA sat 53%, CO/CI 6.4/2.98, no significant LV/AO gradient, +CAD. Formal report pending.    Patient may be a candidate for Entresto as an outpatient. R/LHC to be reviewed.    Potential outpatient dobutamine stress test as per Heart failure.   Plan for ASHLEY/ DCCV later today 12/21/22.   Plan to be discussed / reviewed with CT Surgery attending / team during AM rounds.

## 2022-12-21 NOTE — PROGRESS NOTE ADULT - ASSESSMENT
72 year old female former smoker with PMH of jmorbid obesity, AS, HTN, HLD, DM, cellulitis, bacteremia, UTI, lap band (since removed) LE venous stenosis, vascular ulcers (followed by Dr. Yoo) presents from home with complaints of worsening JETT with light exertion and lower extremity edema with associated palpitation. Patient follows with Dr. Falk (cardiology) and was advised to seek medical attention for recent TTE demonstrating severe aortic stenosis. At time of exam, Pt denies chest pain, palpitations, dizziness, headache, shortness of breath, abdominal pain or N/V/D/C. Admit to Dr. Hopper for TAVR eval.   PT WITH POSITIVE URINE CX ECOLI ESBL   PT HAD UTI AT [place] EARLIER THIS MONTH TREATED WITH MERREM  PT DENIES ANY  SX AT PRESENT  SHE HAD NOT HAD RECENT UTI PRIOR TO LAST HOSPITAL STAY  SHE REPORTS SHE HAS URINARY INCONTINENCE AND HAD SEEN UROLOGIST MANY YEARS AGO    BLOOD CX  X2 SETS PENDING    ALTHOUGH LOWER YIELD AS PT  ALREADY ON ABX      ON MERREM TO COVER ESBL  PT  HAD FEVERS   NOW AFEBRILE  WILL FOLLOW UP  CONTINUE MERREM THOUGH 12/26   SPOKE TO CARDIOTHORACIC NP

## 2022-12-21 NOTE — PROGRESS NOTE ADULT - ASSESSMENT
HF Attending: Dr. Stevens   Primary Cardiologist: Dr. Mayer ([place])    73 y/o female, former smoker, with HFpEF (LVEF 50-55%), aortic stenosis, atrial fibrillation, HTN, HLD, DM (controlled on Metformin), chronic PVD/prior vascular ulcers (followed by Dr. Yoo), morbid obesity/prior lap band (since removed).  Patient endorses progressive dyspnea since September/October. She had a recent prolonged hospitalization at  where she was treated for cellulitis and bacteremia. She was discharged home Friday 12/9 and presented to her primary cardiologist's office for f/u on 12/13 where she was noted to have significant SOB and was referred to [facility].  She has been treated with intermittent IV diuresis and notes significant improvement in LE edema. However, she continues to endorse orthopnea and was only able to walk ~10 feet with PT today due to SOB. Also being treated with Merrem for Ecoli ESBL in urine. She was febrile 12/18 temp 102.2 without leukocytosis.     CTA negative for  PE on 11/29/22.  Admission labwork remarkable for serum pBNP 1023, INR 4. Negative trops.    Cardiac Testing  TTE 12/14/22: LVEF 50-55%, LVIDd 5.11cm, IVSd 1.2cm, mild LAE, severe AGUILA, moderate RVE with low/normal RV function, mild AI, moderate AS (MG 23, PV 3.02m/s), mild MR, moderate TR, PASP 41.4 mmHg (RAP 3 mmHg).  RHC: full report pending, RA ?, PA 63/32 (46), PCWP 18, CO 6.4/ CI 2.98, PA sat 53%.  LHC: full report pending

## 2022-12-21 NOTE — PROGRESS NOTE ADULT - SUBJECTIVE AND OBJECTIVE BOX
ANGELINA RAMIREZ    Patient is a 72y old  Female who presents with a chief complaint of Severe AS (21 Dec 2022 13:00)      Interval History/Overnight events:    S/P ASHLEY     T(C): 36.6 (12-21-22 @ 11:15), Max: 36.8 (12-20-22 @ 16:00)  HR: 75 (12-21-22 @ 14:14)  BP: 120/66 (12-21-22 @ 14:14)  RR: 18 (12-21-22 @ 14:14)  SpO2: 98% (12-21-22 @ 14:14)    PHYSICAL EXAM:    GENERAL: comfortable, not in distress  HEENT: Anicteric sclera, EOMI  CHEST/LUNG: Clear to auscultation bilaterally  HEART: Regular rate and rhythm; No murmurs, rubs, or gallops  ABDOMEN: Soft, Nontender, Nondistended; Bowel sounds present  EXTREMITIES: No clubbing, cyanosis, or edema      LABS:                          10.8   7.53  )-----------( 320      ( 21 Dec 2022 05:50 )             33.9     12-21    138  |  101  |  16.6  ----------------------------<  109<H>  4.0   |  27.0  |  0.52    Ca    8.5      21 Dec 2022 05:50  Mg     1.9     12-21      PT/INR - ( 20 Dec 2022 04:40 )   PT: 16.6 sec;   INR: 1.43 ratio         PTT - ( 21 Dec 2022 05:50 )  PTT:118.2 sec        Culture - Blood (collected 19 Dec 2022 05:14)  Source: .Blood Blood-Peripheral  Preliminary Report (20 Dec 2022 13:01):    No growth to date.    Culture - Blood (collected 19 Dec 2022 05:09)  Source: .Blood Blood-Peripheral  Preliminary Report (20 Dec 2022 13:01):    No growth to date.    Culture - Blood (collected 18 Dec 2022 17:55)  Source: .Blood Blood  Preliminary Report (19 Dec 2022 23:02):    No growth to date.        MEDICATIONS:    MEDICATIONS  (STANDING):  aMIOdarone    Tablet   Oral   aMIOdarone    Tablet 200 milliGRAM(s) Oral daily  atorvastatin 80 milliGRAM(s) Oral at bedtime  ezetimibe 10 milliGRAM(s) Oral daily  furosemide    Tablet 40 milliGRAM(s) Oral daily  gabapentin 800 milliGRAM(s) Oral three times a day  glucagon  Injectable 1 milliGRAM(s) IntraMuscular once  heparin  Infusion.  Unit(s)/Hr (20 mL/Hr) IV Continuous <Continuous>  influenza  Vaccine (HIGH DOSE) 0.7 milliLiter(s) IntraMuscular once  insulin lispro (ADMELOG) corrective regimen sliding scale   SubCutaneous Before meals and at bedtime  insulin lispro Injectable (ADMELOG) 2 Unit(s) SubCutaneous three times a day before meals  meropenem Injectable 1000 milliGRAM(s) IV Push every 8 hours  metoprolol succinate  milliGRAM(s) Oral daily  montelukast 10 milliGRAM(s) Oral at bedtime  saccharomyces boulardii 250 milliGRAM(s) Oral two times a day  sodium chloride 0.9% lock flush 3 milliLiter(s) IV Push every 8 hours  spironolactone 25 milliGRAM(s) Oral daily  verapamil 120 milliGRAM(s) Oral two times a day      MEDICATIONS  (PRN):  acetaminophen     Tablet .. 975 milliGRAM(s) Oral every 8 hours PRN Mild Pain (1 - 3)  zolpidem 5 milliGRAM(s) Oral at bedtime PRN Insomnia  zolpidem 5 milliGRAM(s) Oral at bedtime PRN Insomnia

## 2022-12-21 NOTE — PROGRESS NOTE ADULT - PROBLEM SELECTOR PLAN 2
Currently rate controlled afib on telemetry.   Continue Toprol and Verapamil for HR control.  On coumadin at home for chemical anticoagulation.   Holding Coumadin for now.   Continue Heparin gtt for chemical AC.

## 2022-12-22 ENCOUNTER — TRANSCRIPTION ENCOUNTER (OUTPATIENT)
Age: 72
End: 2022-12-22

## 2022-12-22 LAB
ANION GAP SERPL CALC-SCNC: 10 MMOL/L — SIGNIFICANT CHANGE UP (ref 5–17)
APTT BLD: 55.3 SEC — HIGH (ref 27.5–35.5)
APTT BLD: 66.7 SEC — HIGH (ref 27.5–35.5)
BUN SERPL-MCNC: 14.5 MG/DL — SIGNIFICANT CHANGE UP (ref 8–20)
CALCIUM SERPL-MCNC: 8.7 MG/DL — SIGNIFICANT CHANGE UP (ref 8.4–10.5)
CHLORIDE SERPL-SCNC: 101 MMOL/L — SIGNIFICANT CHANGE UP (ref 96–108)
CO2 SERPL-SCNC: 28 MMOL/L — SIGNIFICANT CHANGE UP (ref 22–29)
CREAT SERPL-MCNC: 0.42 MG/DL — LOW (ref 0.5–1.3)
EGFR: 103 ML/MIN/1.73M2 — SIGNIFICANT CHANGE UP
GLUCOSE BLDC GLUCOMTR-MCNC: 137 MG/DL — HIGH (ref 70–99)
GLUCOSE BLDC GLUCOMTR-MCNC: 179 MG/DL — HIGH (ref 70–99)
GLUCOSE BLDC GLUCOMTR-MCNC: 96 MG/DL — SIGNIFICANT CHANGE UP (ref 70–99)
GLUCOSE SERPL-MCNC: 93 MG/DL — SIGNIFICANT CHANGE UP (ref 70–99)
HCT VFR BLD CALC: 35.8 % — SIGNIFICANT CHANGE UP (ref 34.5–45)
HGB BLD-MCNC: 11.1 G/DL — LOW (ref 11.5–15.5)
MAGNESIUM SERPL-MCNC: 1.6 MG/DL — LOW (ref 1.8–2.6)
MCHC RBC-ENTMCNC: 29.4 PG — SIGNIFICANT CHANGE UP (ref 27–34)
MCHC RBC-ENTMCNC: 31 GM/DL — LOW (ref 32–36)
MCV RBC AUTO: 94.7 FL — SIGNIFICANT CHANGE UP (ref 80–100)
PLATELET # BLD AUTO: 315 K/UL — SIGNIFICANT CHANGE UP (ref 150–400)
POTASSIUM SERPL-MCNC: 3.8 MMOL/L — SIGNIFICANT CHANGE UP (ref 3.5–5.3)
POTASSIUM SERPL-SCNC: 3.8 MMOL/L — SIGNIFICANT CHANGE UP (ref 3.5–5.3)
RBC # BLD: 3.78 M/UL — LOW (ref 3.8–5.2)
RBC # FLD: 14.1 % — SIGNIFICANT CHANGE UP (ref 10.3–14.5)
SODIUM SERPL-SCNC: 139 MMOL/L — SIGNIFICANT CHANGE UP (ref 135–145)
WBC # BLD: 7.22 K/UL — SIGNIFICANT CHANGE UP (ref 3.8–10.5)
WBC # FLD AUTO: 7.22 K/UL — SIGNIFICANT CHANGE UP (ref 3.8–10.5)

## 2022-12-22 PROCEDURE — 99233 SBSQ HOSP IP/OBS HIGH 50: CPT

## 2022-12-22 PROCEDURE — 71045 X-RAY EXAM CHEST 1 VIEW: CPT | Mod: 26

## 2022-12-22 PROCEDURE — 93010 ELECTROCARDIOGRAM REPORT: CPT

## 2022-12-22 PROCEDURE — 99232 SBSQ HOSP IP/OBS MODERATE 35: CPT

## 2022-12-22 RX ORDER — MAGNESIUM SULFATE 500 MG/ML
2 VIAL (ML) INJECTION ONCE
Refills: 0 | Status: COMPLETED | OUTPATIENT
Start: 2022-12-22 | End: 2022-12-22

## 2022-12-22 RX ORDER — IPRATROPIUM/ALBUTEROL SULFATE 18-103MCG
3 AEROSOL WITH ADAPTER (GRAM) INHALATION EVERY 6 HOURS
Refills: 0 | Status: DISCONTINUED | OUTPATIENT
Start: 2022-12-22 | End: 2022-12-23

## 2022-12-22 RX ADMIN — ATORVASTATIN CALCIUM 80 MILLIGRAM(S): 80 TABLET, FILM COATED ORAL at 22:15

## 2022-12-22 RX ADMIN — HEPARIN SODIUM 2000 UNIT(S)/HR: 5000 INJECTION INTRAVENOUS; SUBCUTANEOUS at 22:01

## 2022-12-22 RX ADMIN — MEROPENEM 1000 MILLIGRAM(S): 1 INJECTION INTRAVENOUS at 16:24

## 2022-12-22 RX ADMIN — Medication 2: at 17:57

## 2022-12-22 RX ADMIN — ZOLPIDEM TARTRATE 5 MILLIGRAM(S): 10 TABLET ORAL at 00:04

## 2022-12-22 RX ADMIN — HEPARIN SODIUM 1800 UNIT(S)/HR: 5000 INJECTION INTRAVENOUS; SUBCUTANEOUS at 07:11

## 2022-12-22 RX ADMIN — Medication 40 MILLIGRAM(S): at 05:20

## 2022-12-22 RX ADMIN — HEPARIN SODIUM 2000 UNIT(S)/HR: 5000 INJECTION INTRAVENOUS; SUBCUTANEOUS at 07:50

## 2022-12-22 RX ADMIN — GABAPENTIN 800 MILLIGRAM(S): 400 CAPSULE ORAL at 22:14

## 2022-12-22 RX ADMIN — GABAPENTIN 800 MILLIGRAM(S): 400 CAPSULE ORAL at 05:17

## 2022-12-22 RX ADMIN — SPIRONOLACTONE 25 MILLIGRAM(S): 25 TABLET, FILM COATED ORAL at 05:19

## 2022-12-22 RX ADMIN — MEROPENEM 1000 MILLIGRAM(S): 1 INJECTION INTRAVENOUS at 05:19

## 2022-12-22 RX ADMIN — GABAPENTIN 800 MILLIGRAM(S): 400 CAPSULE ORAL at 18:03

## 2022-12-22 RX ADMIN — Medication 25 GRAM(S): at 12:39

## 2022-12-22 RX ADMIN — Medication 250 MILLIGRAM(S): at 17:57

## 2022-12-22 RX ADMIN — MONTELUKAST 10 MILLIGRAM(S): 4 TABLET, CHEWABLE ORAL at 22:15

## 2022-12-22 RX ADMIN — AMIODARONE HYDROCHLORIDE 200 MILLIGRAM(S): 400 TABLET ORAL at 05:18

## 2022-12-22 RX ADMIN — SODIUM CHLORIDE 3 MILLILITER(S): 9 INJECTION INTRAMUSCULAR; INTRAVENOUS; SUBCUTANEOUS at 22:00

## 2022-12-22 RX ADMIN — Medication 250 MILLIGRAM(S): at 05:18

## 2022-12-22 RX ADMIN — Medication 2 UNIT(S): at 17:57

## 2022-12-22 RX ADMIN — ZOLPIDEM TARTRATE 5 MILLIGRAM(S): 10 TABLET ORAL at 22:14

## 2022-12-22 RX ADMIN — Medication 25 GRAM(S): at 16:25

## 2022-12-22 RX ADMIN — Medication 975 MILLIGRAM(S): at 23:41

## 2022-12-22 RX ADMIN — SODIUM CHLORIDE 3 MILLILITER(S): 9 INJECTION INTRAMUSCULAR; INTRAVENOUS; SUBCUTANEOUS at 15:39

## 2022-12-22 RX ADMIN — SODIUM CHLORIDE 3 MILLILITER(S): 9 INJECTION INTRAMUSCULAR; INTRAVENOUS; SUBCUTANEOUS at 05:19

## 2022-12-22 RX ADMIN — Medication 100 MILLIGRAM(S): at 05:18

## 2022-12-22 RX ADMIN — Medication 3 MILLILITER(S): at 09:20

## 2022-12-22 RX ADMIN — MEROPENEM 1000 MILLIGRAM(S): 1 INJECTION INTRAVENOUS at 22:13

## 2022-12-22 NOTE — DIETITIAN INITIAL EVALUATION ADULT - ORAL INTAKE PTA/DIET HISTORY
Pt reports eating well PTA; reports living with her  and does her own cooking. Pt denies any recent weight loss. Pt reports being hungry secondary to being NPO for procedures/tests. Pt denies difficulty chewing or swallowing at this time. Pt declined diet education at this time.

## 2022-12-22 NOTE — DIETITIAN INITIAL EVALUATION ADULT - OTHER INFO
Pt is a 72 year old female former smoker with PMH of morbid obesity, Aortic Stenosis, HTN, HLD, type 2 DM (HA1c 6.3 on Metformin and Farxiga), LE cellulitis, bacteremia, recently admitted to [facility] 11/30-12/8 of ESBL E.Coli and Proteus UTI, lap band (since removed), chronic LE venous stasis with vascular ulcers (followed by Dr. Yoo), presented from home 12/14/22 with complaints of worsening JETT with light exertion and lower extremity edema with associated palpitations. Patient follows with Cardiologist Dr. Mayer and was advised to seek medical attention for recent outpatient TTE demonstrating severe aortic stenosis. Patient admitted to CT Surgery service under Dr. Hopper for TAVR evaluation. Repeat TTE on admission showing Moderate Aortic stenosis with an ADRIAN of 1.1 with low gradients. Right and Left heart catheterization performed 12/20/22 per heart failure and pulmonary recommendations to r/o pulmonary HTN. Patient underwent ASHLEY 12/21/22 with Severe AS and an ADRIAN of 0.9. Decision made for ASHLEY/cardioversion later today 12/22/22.

## 2022-12-22 NOTE — DIETITIAN INITIAL EVALUATION ADULT - PERTINENT MEDS FT
MEDICATIONS  (STANDING):  aMIOdarone    Tablet 200 milliGRAM(s) Oral daily  aMIOdarone    Tablet   Oral   atorvastatin 80 milliGRAM(s) Oral at bedtime  dextrose 50% Injectable 25 Gram(s) IV Push once  dextrose 50% Injectable 12.5 Gram(s) IV Push once  dextrose 50% Injectable 25 Gram(s) IV Push once  dextrose Oral Gel 15 Gram(s) Oral once  ezetimibe 10 milliGRAM(s) Oral daily  furosemide    Tablet 40 milliGRAM(s) Oral daily  gabapentin 800 milliGRAM(s) Oral three times a day  glucagon  Injectable 1 milliGRAM(s) IntraMuscular once  heparin  Infusion.  Unit(s)/Hr (20 mL/Hr) IV Continuous <Continuous>  influenza  Vaccine (HIGH DOSE) 0.7 milliLiter(s) IntraMuscular once  insulin lispro (ADMELOG) corrective regimen sliding scale   SubCutaneous Before meals and at bedtime  insulin lispro Injectable (ADMELOG) 2 Unit(s) SubCutaneous three times a day before meals  meropenem Injectable 1000 milliGRAM(s) IV Push every 8 hours  metoprolol succinate  milliGRAM(s) Oral daily  montelukast 10 milliGRAM(s) Oral at bedtime  saccharomyces boulardii 250 milliGRAM(s) Oral two times a day  sodium chloride 0.9% lock flush 3 milliLiter(s) IV Push every 8 hours  spironolactone 25 milliGRAM(s) Oral daily    MEDICATIONS  (PRN):  acetaminophen     Tablet .. 975 milliGRAM(s) Oral every 8 hours PRN Mild Pain (1 - 3)  albuterol/ipratropium for Nebulization 3 milliLiter(s) Nebulizer every 6 hours PRN Wheezing  zolpidem 5 milliGRAM(s) Oral at bedtime PRN Insomnia  zolpidem 5 milliGRAM(s) Oral at bedtime PRN Insomnia

## 2022-12-22 NOTE — PROGRESS NOTE ADULT - NS ATTEND AMEND GEN_ALL_CORE FT
Patient was seen and examined at bedside with the advanced care provider.    Planned for DCCV today.  Patient still feels very short of breath transferring from bed to chair.  ASHLEY showed severe AS by planimetry area (< 1cm) however dimensionless index 0.26 and MG 10mmg more consistent with moderate AS.  Patient going to cardioversion today.  If symptoms, do not improve, will set her up for a dobutamine stress echocardiogram tomorrow.  Needs to be NPO at midnight.  AM beta blocker dose to be held prior to study.  She may fall into this category of paradoxical low-flow low-gradient AS especially given her LV hypertrophy.

## 2022-12-22 NOTE — PROGRESS NOTE ADULT - SUBJECTIVE AND OBJECTIVE BOX
Subjective:    Pt seen and examined in CCL holding room bed #11 this afternoon. No events noted overnight. Pt underwent ASHLEY yesterday which revealed LVEF 55-60% and severe AS. Pt now s/p successful DCCV (360j x 1) with successful conversion to sinus rhyth. Pt reports no complaints post procedure.    Post DCCV EKG: Sinus rhythm with intact conduction.      MEDICATIONS  (STANDING):  aMIOdarone    Tablet 200 milliGRAM(s) Oral daily  aMIOdarone    Tablet   Oral   atorvastatin 80 milliGRAM(s) Oral at bedtime  dextrose 50% Injectable 25 Gram(s) IV Push once  dextrose 50% Injectable 12.5 Gram(s) IV Push once  dextrose 50% Injectable 25 Gram(s) IV Push once  dextrose Oral Gel 15 Gram(s) Oral once  ezetimibe 10 milliGRAM(s) Oral daily  furosemide    Tablet 40 milliGRAM(s) Oral daily  gabapentin 800 milliGRAM(s) Oral three times a day  glucagon  Injectable 1 milliGRAM(s) IntraMuscular once  heparin  Infusion.  Unit(s)/Hr (20 mL/Hr) IV Continuous <Continuous>  influenza  Vaccine (HIGH DOSE) 0.7 milliLiter(s) IntraMuscular once  insulin lispro (ADMELOG) corrective regimen sliding scale   SubCutaneous Before meals and at bedtime  insulin lispro Injectable (ADMELOG) 2 Unit(s) SubCutaneous three times a day before meals  magnesium sulfate  IVPB 2 Gram(s) IV Intermittent once  meropenem Injectable 1000 milliGRAM(s) IV Push every 8 hours  metoprolol succinate  milliGRAM(s) Oral daily  montelukast 10 milliGRAM(s) Oral at bedtime  saccharomyces boulardii 250 milliGRAM(s) Oral two times a day  sodium chloride 0.9% lock flush 3 milliLiter(s) IV Push every 8 hours  spironolactone 25 milliGRAM(s) Oral daily    MEDICATIONS  (PRN):  acetaminophen     Tablet .. 975 milliGRAM(s) Oral every 8 hours PRN Mild Pain (1 - 3)  albuterol/ipratropium for Nebulization 3 milliLiter(s) Nebulizer every 6 hours PRN Wheezing  zolpidem 5 milliGRAM(s) Oral at bedtime PRN Insomnia  zolpidem 5 milliGRAM(s) Oral at bedtime PRN Insomnia      Allergies    IV DYE, IODINE CONTRAST (Unknown)  latex (Unknown)  penicillin (Hives; Urticaria)  pregabalin (Unknown)    Intolerances        Vital Signs Last 24 Hrs  T(C): 36.8 (22 Dec 2022 10:10), Max: 37.1 (22 Dec 2022 01:00)  T(F): 98.2 (22 Dec 2022 10:10), Max: 98.8 (22 Dec 2022 01:00)  HR: 60 (22 Dec 2022 14:24) (60 - 87)  BP: 115/51 (22 Dec 2022 14:30) (110/71 - 133/63)  BP(mean): 92 (21 Dec 2022 15:51) (92 - 92)  RR: 17 (22 Dec 2022 14:24) (17 - 18)  SpO2: 95% (22 Dec 2022 14:24) (91% - 98%)    Parameters below as of 22 Dec 2022 14:24  Patient On (Oxygen Delivery Method): room air        Physical Exam:    Neuro: A+O x 3,  HEENT:  NCAT  Neck:  Supple, trachea midline  Pulm: CTA bilaterally, no accessory muscle use noted  CV: RRR, +S1S2, r  Abd: soft, NT, ND, + BS  Ext: CAPPS x 4, trace LE edema b/l with chronic LE skin changes/ hyperpigmentation from chronic venous insufficiency  Skin: warm, dry, perfused      LABS:                        11.1   7.22  )-----------( 315      ( 22 Dec 2022 06:45 )             35.8     12-22    139  |  101  |  14.5  ----------------------------<  93  3.8   |  28.0  |  0.42<L>    Ca    8.7      22 Dec 2022 06:45  Mg     1.6     12-22      PTT - ( 22 Dec 2022 06:45 )  PTT:55.3 sec      RADIOLOGY & ADDITIONAL TESTS:    < from: Xray Chest 1 View- PORTABLE-Routine (Xray Chest 1 View- PORTABLE-Routine in AM.) (12.21.22 @ 06:15) >  IMPRESSION:  Perihilar infiltrates, new.  Mild, central pulmonary venous congestion, increased  < end of copied text >    ASHLEY:  < from: ASHLEY Echo Doppler (12.21.22 @ 12:18) >  Summary:   1. Left ventricular ejection fraction, by visual estimation, is 55 to 60%.   2. Normal global left ventricular systolic function.   3. Increased LV wall thickness.   4. Left atrial enlargement.   5. Significant spontaneous echo contrast noted in LA and ECHO.   6. Right atrial enlargement.   7. Color flow doppler and intravenous injection of agitated saline demonstrates the presence of an intact intra atrial septum.   8. Mild mitral annular calcification.   9. Mild mitral valve regurgitation.  10. Moderate tricuspid regurgitation.  11. Severe aortic valve stenosis.  12. ADRIAN by planimetry = 0.9 cm2. Calculated ADRIAN by VTI = 0.8cm2. DI = 0.26.  < end of copied text >        Assessment:      72 year old female, former smoker, with PMH of HFpEF (LVEF 50-55%), progressive aortic stenosis, persistent atrial fibrillation (s/p prior DCCV x 2; both unsuccessful- last ~3 years ago), HTN, hyperlipidemia, DM type 2, chronic PVD/prior vascular ulcers (followed by wound care and Dr. Yoo), and morbid obesity/prior lap band (since removed). She recently had worsening JETT with light exertion and lower extremity edema. Outpatient TTE showed worsening aortic stenosis and she was referred to [facility] by Dr. George for further evaluation and consideration for TAVR. Repeat echo demonstrated moderate AS, LHC on 12/20 showed no significant aortic valve gradients, some CAD noted.      Pt underwent ASHLEY yesterday which revealed LVEF 55-60% and severe AS. Pt presented to [facility] today and is now s/p successful DCCV (360j x 1) with successful conversion to sinus rhyth. Pt reports no complaints post procedure.      Plan:   Observation on telemetry per post op protocol.    Resume PO intake.   Ambulate w/ assist once fully awake & back to baseline mental status w/ VSS.  Continue amiodarone 200mg daily.   Continue metoprolol 100mg daily.   Pt currently maintain sinus rhythm with ventricular rates in 60s. Will continue to monitor on current regiment and consider adding verapamil based on clinical course.  Continue heparin gtt for now. PT/PTT as per protocol.   Can start bridge to Coumadin if ok with CTsx team and no other procedures are planned  EP to follow    Case discussed with Dr. Estes            **Please include in discharge instructions    -Take each dose of your anticoagulation medication (blood thinner) exactly as prescribed.   -Resume your diet with soft foods first.  - Do not drive, operate heavy machinery or make important decisions for 24 hours following the procedure.  - You may resume all other activities the day after the procedure.  Call your doctor if:   -you develop a fever, cough up blood, have any chest pain, palpitations or difficulty breathing. You may take a throat lozenge if you develop a sore throat or try warm salt water gargle.   - you have any questions or concerns regarding the procedure.  If you experience increased difficulty breathing or chest pain, or if you faint, have dizzy spells, or for any other distressing symptom, please seek immediate medical attention.

## 2022-12-22 NOTE — DIETITIAN INITIAL EVALUATION ADULT - PERTINENT LABORATORY DATA
12-22    139  |  101  |  14.5  ----------------------------<  93  3.8   |  28.0  |  0.42<L>    Ca    8.7      22 Dec 2022 06:45  Mg     1.6     12-22    POCT Blood Glucose.: 96 mg/dL (12-22-22 @ 07:57)  A1C with Estimated Average Glucose Result: 6.3 % (12-14-22 @ 21:30)  A1C with Estimated Average Glucose Result: 6.3 % (11-30-22 @ 06:46)  A1C with Estimated Average Glucose Result: 5.8 % (10-13-22 @ 05:44)

## 2022-12-22 NOTE — PROGRESS NOTE ADULT - PROBLEM SELECTOR PLAN 2
Currently rate controlled afib on telemetry.   Continue Toprol and Verapamil for HR control.  On coumadin at home for chemical anticoagulation.   Holding Coumadin for now.   Continue Heparin gtt for chemical AC. Currently rate controlled afib on telemetry.   Continue Toprol as tolerated by HR and BP.   Hold Verapamil X 24 hrs prior to cardioversion per EP.   On coumadin at home for chemical anticoagulation.   Holding Coumadin for now.   Continue Heparin gtt for chemical AC.

## 2022-12-22 NOTE — DIETITIAN INITIAL EVALUATION ADULT - NS FNS DIET ORDER
Diet, DASH/TLC:   Sodium & Cholesterol Restricted     Special Instructions for Nursing:  Sodium & Cholesterol Restricted (12-20-22 @ 11:01)

## 2022-12-22 NOTE — DISCHARGE NOTE NURSING/CASE MANAGEMENT/SOCIAL WORK - NSDCPEFALRISK_GEN_ALL_CORE
For information on Fall & Injury Prevention, visit: [email or web address] OR  [email or web address] OR  https://www.cdc.gov/steadi/patient.html

## 2022-12-22 NOTE — PROGRESS NOTE ADULT - SUBJECTIVE AND OBJECTIVE BOX
[facility] Advanced Heart Failure  Office: [address] [place] [address]  Service Phone [phone]  Office Phone: [phone]/Fax: [phone]    Subjective/Objective: NAEO. Pt underwent ASHLEY yesterday. NPO for cardioversion today.     Medications:  acetaminophen     Tablet .. 975 milliGRAM(s) Oral every 8 hours PRN  albuterol/ipratropium for Nebulization 3 milliLiter(s) Nebulizer every 6 hours PRN  aMIOdarone    Tablet 200 milliGRAM(s) Oral daily  aMIOdarone    Tablet   Oral   atorvastatin 80 milliGRAM(s) Oral at bedtime  dextrose 50% Injectable 25 Gram(s) IV Push once  dextrose 50% Injectable 12.5 Gram(s) IV Push once  dextrose 50% Injectable 25 Gram(s) IV Push once  dextrose Oral Gel 15 Gram(s) Oral once  ezetimibe 10 milliGRAM(s) Oral daily  furosemide    Tablet 40 milliGRAM(s) Oral daily  gabapentin 800 milliGRAM(s) Oral three times a day  glucagon  Injectable 1 milliGRAM(s) IntraMuscular once  heparin  Infusion.  Unit(s)/Hr IV Continuous <Continuous>  influenza  Vaccine (HIGH DOSE) 0.7 milliLiter(s) IntraMuscular once  insulin lispro (ADMELOG) corrective regimen sliding scale   SubCutaneous Before meals and at bedtime  insulin lispro Injectable (ADMELOG) 2 Unit(s) SubCutaneous three times a day before meals  magnesium sulfate  IVPB 2 Gram(s) IV Intermittent once  meropenem Injectable 1000 milliGRAM(s) IV Push every 8 hours  metoprolol succinate  milliGRAM(s) Oral daily  montelukast 10 milliGRAM(s) Oral at bedtime  saccharomyces boulardii 250 milliGRAM(s) Oral two times a day  sodium chloride 0.9% lock flush 3 milliLiter(s) IV Push every 8 hours  spironolactone 25 milliGRAM(s) Oral daily  zolpidem 5 milliGRAM(s) Oral at bedtime PRN  zolpidem 5 milliGRAM(s) Oral at bedtime PRN    Vital Signs Last 24 Hours  T(C): 36.8 (22 @ 10:10), Max: 37.1 (22 @ 01:00)  HR: 60 (22 @ 14:24) (60 - 87)  BP: 115/51 (22 @ 14:30) (110/71 - 133/63)  RR: 17 (22 @ 14:24) (17 - 18)  SpO2: 95% (22 @ 14:24) (91% - 98%)    Weight in k.3 ( @ 04:50)    I&O's Summary  21 Dec 2022 07:01  -  22 Dec 2022 07:00  --------------------------------------------------------  IN: 566 mL / OUT: 1300 mL / NET: -734 mL    22 Dec 2022 07:01  -  22 Dec 2022 14:53  --------------------------------------------------------  IN: 60 mL / OUT: 1550 mL / NET: -1490 mL    TELE: Atrial fibrillation     Physical Exam:  General: No distress. Comfortable.  HEENT: EOM intact.  Neck: Neck supple. JVP ~7-8cm H20.  Chest: Diminished at bases.  CV: Irregularly irregular. Normal S1 and S2. II/VI systolic murmur LSB.  PV: 1+ LLE pedal edema. Venous stasis discoloration noted bilaterally.  Abdomen: Soft, non-distended  Skin: generalized ecchymosis  Neurology: Alert and oriented times three. Sensation intact  Psych: Affect normal    Labs:                        11.1   7.22  )-----------( 315      ( 22 Dec 2022 06:45 )             35.8         139  |  101  |  14.5  ----------------------------<  93  3.8   |  28.0  |  0.42<L>    Ca    8.7      22 Dec 2022 06:45  Mg     1.6           PTT - ( 22 Dec 2022 06:45 )  PTT:55.3 sec    ASHLEY Echo Doppler (22 @ 12:18)   Summary:   1. Left ventricular ejection fraction, by visual estimation, is 55 to   60%.   2. Normal global left ventricular systolic function.   3. Increased LV wall thickness.   4. Left atrial enlargement.   5. Significant spontaneous echo contrast noted in LA and ECHO.   6. Right atrial enlargement.   7. Color flow doppler and intravenous injection of agitated saline   demonstrates the presence of an intact intra atrial septum.   8. Mild mitral annular calcification.   9. Mild mitral valve regurgitation.  10. Moderate tricuspid regurgitation.  11. Severe aortic valve stenosis.  12. ADRIAN by planimetry = 0.9 cm2. Calculated ADRIAN by VTI = 0.8cm2. DI =   0.26.    < end of copied text >

## 2022-12-22 NOTE — PROGRESS NOTE ADULT - PROBLEM SELECTOR PLAN 1
Patient with Severe AS on outpatient TTE with associated JETT.    Admitted for TAVR evaluation and chronic diastolic heart failure exacerbation.  Repeat TTE showing Moderate AS with an ADRIAN of 1.1 and low gradients.   Patient with dyspnea and new O2 requirements since admission.   Pulmonology following.   Heart failure team following.   Patient appears euvolemic.   Lasix 40PO QD and Aldactone for Heart failure regimen.    Continue Toprol as tolerated by HR and BP.   S/p RHC/ LHC 12/20/22 revealing PCW 28mmHg, PA 63/32 with mean 46mmHg, PA sat 53%, CO/CI 6.4/2.98, no significant LV/AO gradient, +CAD. Formal report pending.    Patient may be a candidate for Entresto as an outpatient. R/LHC to be reviewed.    Potential outpatient dobutamine stress test as per Heart failure.   Plan for ASHLEY/ DCCV later today 12/22/22.   Plan to be discussed / reviewed with CT Surgery attending / team during AM rounds.

## 2022-12-22 NOTE — PROGRESS NOTE ADULT - PROBLEM SELECTOR PLAN 1
-LVEF 50-55%, LVIDd 5.11, moderate RVE with low normal RV function  -NYHA class IIIB symptoms   -Clinically appears close to euvolemia on exam.  Elevated left sided filling pressures consistent with HFpEF diagnosis.  RA pressures not yet available to review post cath.    -No longer on Jardiance in setting of recurrent UTI.   -Diuretics: Lasix 40mg daily.   -May be able to consider Entresto therapy for her for HFpEF.  Will re-evaluate her BP after cardioversion.   -Continue Toprol-XL 100mg daily, Verapamil 120mg BID and Spironolactone 25mg daily.  -She may be a candidate for a CardioMEMS device as an outpatient.

## 2022-12-22 NOTE — PROGRESS NOTE ADULT - ASSESSMENT
HF Attending: Dr. Stevens   Primary Cardiologist: Dr. Mayer ([place])    73 y/o female, former smoker, with HFpEF (LVEF 50-55%), aortic stenosis, atrial fibrillation, HTN, HLD, DM (controlled on Metformin), chronic PVD/prior vascular ulcers (followed by Dr. Yoo), morbid obesity/prior lap band (since removed).  Patient endorses progressive dyspnea since September/October. She had a recent prolonged hospitalization at  where she was treated for cellulitis and bacteremia. She was discharged home Friday 12/9 and presented to her primary cardiologist's office for f/u on 12/13 where she was noted to have significant SOB and was referred to [facility]. CTA negative for  PE on 11/29/22. Admission labwork remarkable for serum pBNP 1023, INR 4. Negative trops.  She has been treated with intermittent IV diuresis and notes significant improvement in LE edema. However, she continues to endorse orthopnea and was only able to walk ~10 feet with PT due to SOB. Also being treated with Merrem for Ecoli ESBL in urine. She was febrile 12/18 temp 102.2 without leukocytosis.   ASHLEY 12/21  Plan for DCCV 12/22    Cardiac Testing  TTE 12/14/22: LVEF 50-55%, LVIDd 5.11cm, IVSd 1.2cm, mild LAE, severe AGUILA, moderate RVE with low/normal RV function, mild AI, moderate AS (MG 23, PV 3.02m/s), mild MR, moderate TR, PASP 41.4 mmHg (RAP 3 mmHg).  RHC: full report pending, RA ?, PA 63/32 (46), PCWP 18, CO 6.4/ CI 2.98, PA sat 53%.  LHC: full report pending

## 2022-12-22 NOTE — DISCHARGE NOTE NURSING/CASE MANAGEMENT/SOCIAL WORK - PATIENT PORTAL LINK FT
You can access the FollowMyHealth Patient Portal offered by [facility] by registering at the following website: [email or web address]. By joining [facility]’s FollowMyHealth portal, you will also be able to view your health information using other applications (apps) compatible with our system.

## 2022-12-22 NOTE — PROGRESS NOTE ADULT - ASSESSMENT
72 year old female former smoker with PMH of morbid obesity, Aortic Stenosis, HTN, HLD, type 2 DM (HA1c 6.3 on Metformin and Farxiga), LE cellulitis, bacteremia, recently admitted to [facility] 11/30-12/8 of ESBL E.Coli and Proteus UTI, lap band (since removed), chronic LE venous stasis with vascular ulcers (followed by Dr. Yoo), presented from home 12/14/22 with complaints of worsening JETT with light exertion and lower extremity edema with associated palpitations. Patient follows with Cardiologist Dr. Mayer and was advised to seek medical attention for recent outpatient TTE demonstrating severe aortic stenosis. Patient admitted to CT Surgery service under Dr. Hopper for TAVR evaluation. Repeat TTE on admission showing Moderate Aortic stenosis with an ADRIAN of 1.1 with low gradients. Right and Left heart catheterization performed 12/20/22 per heart failure and pulmonary recommendations to r/o pulmonary HTN. Patient underwent ASHLEY 12/21/22 with Severe AS and an ADRIAN of 0.9. Decision made for ASHLEY/cardioversion later today 12/22/22.

## 2022-12-22 NOTE — PROGRESS NOTE ADULT - PROBLEM SELECTOR PLAN 2
-Only moderate on TTE (ADRIAN 1.1, PPG 36.5, DI 0.36). No significant gradient LHC. ASHLEY showed ADRIAN 0.8, DI 0.26 MG not.  -Dobutamine stress echo planned for tomorrow. Keep NPO after MN.

## 2022-12-22 NOTE — PROGRESS NOTE ADULT - SUBJECTIVE AND OBJECTIVE BOX
Evaluation for Aortic stenosis    PAST MEDICAL & SURGICAL HISTORY:  Diabetes mellitus type II, controlled  Atrial fibrillation  Congestive heart failure  Dyspnea  Morbid obesity with BMI of 40.0-44.9, adult  Neuropathy  Peripheral venous insufficiency  Thyroid nodule  HTN (hypertension)  Cellulitis  History of esophagogastroduodenoscopy (EGD)  H/O colonoscopy  Other complications of gastric band procedure  S/P left knee arthroscopy  Status post medial meniscus repair    FAMILY HISTORY:  Family history of breast cancer in mother  Family history of diabetes mellitus in mother    Brief Hospital Course: 72 year old female former smoker with PMH of morbid obesity, Aortic Stenosis, HTN, HLD, type 2 DM (HA1c 6.3 on Metformin and Farxiga), LE cellulitis, bacteremia, recently admitted to [facility] 11/30-12/8 of ESBL E.Coli and Proteus UTI, lap band (since removed), chronic LE venous stasis with vascular ulcers (followed by Dr. Yoo), presented from home 12/14/22 with complaints of worsening JETT with light exertion and lower extremity edema with associated palpitations. Patient follows with Cardiologist Dr. Mayer and was advised to seek medical attention for recent outpatient TTE demonstrating severe aortic stenosis. Patient admitted to CT Surgery service under Dr. Hopper for TAVR evaluation. Repeat TTE on admission showing Moderate Aortic stenosis with an ADRIAN of 1.1 with low gradients. Right and Left heart catheterization performed 12/20/22 per heart failure and pulmonary recommendations to r/o pulmonary HTN. Patient underwent ASHLEY 12/21/22 with Severe AS and an ADRIAN of 0.9. Decision made for ASHLEY/cardioversion later today 12/22/22.     Significant recent/past 24 hr events: No overnight events reported.    Subjective: Patient lying in bed in NAD. +Tolerating diet. +Passing BMs. +JETT. Expressing frustration that she is still short of breath while trying to walk around. No pain elicited at this time. Denies fevers, chills, lightheadedness, dizziness, HA, CP, palpitations, SOB, cough, abdominal pain, N/V, diarrhea, numbness/tingling in extremities, or any other acute complaints. ROS negative x 10 systems except as noted above.    MEDICATIONS  (STANDING):  aMIOdarone    Tablet 200 milliGRAM(s) Oral daily  atorvastatin 80 milliGRAM(s) Oral at bedtime  ezetimibe 10 milliGRAM(s) Oral daily  furosemide    Tablet 40 milliGRAM(s) Oral daily  gabapentin 800 milliGRAM(s) Oral three times a day  heparin  Infusion.  Unit(s)/Hr (20 mL/Hr) IV Continuous   influenza  Vaccine (HIGH DOSE) 0.7 milliLiter(s) IntraMuscular once  insulin lispro (ADMELOG) corrective regimen sliding scale   SubCutaneous Before meals and at bedtime  insulin lispro Injectable (ADMELOG) 2 Unit(s) SubCutaneous three times a day before meals  meropenem Injectable 1000 milliGRAM(s) IV Push every 8 hours  metoprolol succinate  milliGRAM(s) Oral daily  montelukast 10 milliGRAM(s) Oral at bedtime  saccharomyces boulardii 250 milliGRAM(s) Oral two times a day  sodium chloride 0.9% lock flush 3 milliLiter(s) IV Push every 8 hours  spironolactone 25 milliGRAM(s) Oral daily  verapamil 120 milliGRAM(s) Oral two times a day    MEDICATIONS  (PRN):  acetaminophen     Tablet .. 975 milliGRAM(s) Oral every 8 hours PRN Mild Pain (1 - 3)  zolpidem 5 milliGRAM(s) Oral at bedtime PRN Insomnia  zolpidem 5 milliGRAM(s) Oral at bedtime PRN Insomnia    Allergies:  IV DYE, IODINE CONTRAST (Unknown)  latex (Unknown)  penicillin (Hives; Urticaria)  pregabalin (Unknown)    Vitals   T(C): 36.8 (21 Dec 2022 21:00), Max: 36.8 (21 Dec 2022 21:00)  T(F): 98.2 (21 Dec 2022 21:00), Max: 98.2 (21 Dec 2022 21:00)  HR: 87 (21 Dec 2022 21:00) (58 - 87)  BP: 119/63 (21 Dec 2022 21:00) (104/65 - 148/83)  BP(mean): 92 (21 Dec 2022 15:51) (92 - 92)  RR: 17 (21 Dec 2022 21:00) (14 - 18)  SpO2: 91% (21 Dec 2022 21:00) (91% - 100%)  O2 Parameters below as of 21 Dec 2022 21:00  Patient On (Oxygen Delivery Method): room air    I&O's Detail    20 Dec 2022 07:01  -  21 Dec 2022 07:00  --------------------------------------------------------  IN:    Heparin Infusion: 360 mL    Oral Fluid: 470 mL  Total IN: 830 mL    OUT:    Voided (mL): 660 mL  Total OUT: 660 mL    Total NET: 170 mL      21 Dec 2022 07:01  -  22 Dec 2022 02:14  --------------------------------------------------------  IN:    Heparin Infusion: 108 mL  Total IN: 108 mL    OUT:    Voided (mL): 1000 mL  Total OUT: 1000 mL    Total NET: -892 mL    Physical Exam  Neuro: A+O x 3, non-focal, speech clear and intact  HEENT:  NCAT, No conjuctival edema or icterus, no thrush.    Neck:  Supple, trachea midline  Pulm: CTA bilaterally, no accessory muscle use noted  CV: regular rate, irregularly irregular rhythm, +S1S2, +murmur  Abd: soft, NT, ND, + BS  Ext: CAPPS x 4, trace LE edema b/l with chronic LE skin changes/ hyperpigmentation from chronic venous insufficiency, no cyanosis, overall distal motor/neuro/circ intact  right radial cath site C/D/I, no hematoma noted  Skin: warm, dry, perfused    LABS                        10.8   7.53  )-----------( 320      ( 21 Dec 2022 05:50 )             33.9     12-21    138  |  101  |  16.6  ----------------------------<  109<H>  4.0   |  27.0  |  0.52    Ca    8.5      21 Dec 2022 05:50  Mg     1.9     12-21    PT/INR - ( 20 Dec 2022 04:40 )   PT: 16.6 sec;   INR: 1.43 ratio       PTT - ( 21 Dec 2022 22:10 )  PTT:71.0 sec    POCT Blood Glucose.: 197 mg/dL (12-21-22 @ 21:48)  POCT Blood Glucose.: 168 mg/dL (12-21-22 @ 17:02)  POCT Blood Glucose.: 116 mg/dL (12-21-22 @ 08:13)      Last CXR:  < from: Xray Chest 1 View- PORTABLE-Routine (Xray Chest 1 View- PORTABLE-Routine in AM.) (12.21.22 @ 06:15) >  IMPRESSION:  Perihilar infiltrates, new.  Mild, central pulmonary venous congestion, increased  < end of copied text >    ASHLEY:  < from: ASHLEY Echo Doppler (12.21.22 @ 12:18) >  Summary:   1. Left ventricular ejection fraction, by visual estimation, is 55 to 60%.   2. Normal global left ventricular systolic function.   3. Increased LV wall thickness.   4. Left atrial enlargement.   5. Significant spontaneous echo contrast noted in LA and ECHO.   6. Right atrial enlargement.   7. Color flow doppler and intravenous injection of agitated saline demonstrates the presence of an intact intra atrial septum.   8. Mild mitral annular calcification.   9. Mild mitral valve regurgitation.  10. Moderate tricuspid regurgitation.  11. Severe aortic valve stenosis.  12. ADRIAN by planimetry = 0.9 cm2. Calculated ADRIAN by VTI = 0.8cm2. DI = 0.26.  < end of copied text >

## 2022-12-22 NOTE — DISCHARGE NOTE NURSING/CASE MANAGEMENT/SOCIAL WORK - NSDCPEELIQUISFU_GEN_ALL_CORE
Patient is scheduled for a colonoscopy with Dr Nelson on 1/19/22. Please send Nulytely prep to patient's selected pharmacy.        Please place COVID order if not already placed and test is required    Please review for Immunocompromised if reviewed prior to June 7,2021    Thank you,  GI Preadmit Surgery Scheduler 
call from patient to schedule with Dr Nelson - preferrably at [facility] or [facility].   He also would like an early AM appt if possible.   Please review and forward to recall scheduling team to help him schedule Jan/Feb 2022  
Go for blood tests as directed. Your doctor will do lab tests at regular visits to monitor the effects of this medicine. Please follow up with your doctor and keep your health care provider appointments.

## 2022-12-23 ENCOUNTER — TRANSCRIPTION ENCOUNTER (OUTPATIENT)
Age: 72
End: 2022-12-23

## 2022-12-23 VITALS
HEART RATE: 67 BPM | DIASTOLIC BLOOD PRESSURE: 56 MMHG | SYSTOLIC BLOOD PRESSURE: 115 MMHG | RESPIRATION RATE: 17 BRPM | OXYGEN SATURATION: 95 % | TEMPERATURE: 98 F

## 2022-12-23 LAB
ANION GAP SERPL CALC-SCNC: 8 MMOL/L — SIGNIFICANT CHANGE UP (ref 5–17)
APTT BLD: 81.7 SEC — HIGH (ref 27.5–35.5)
BUN SERPL-MCNC: 16.9 MG/DL — SIGNIFICANT CHANGE UP (ref 8–20)
CALCIUM SERPL-MCNC: 8.5 MG/DL — SIGNIFICANT CHANGE UP (ref 8.4–10.5)
CHLORIDE SERPL-SCNC: 102 MMOL/L — SIGNIFICANT CHANGE UP (ref 96–108)
CO2 SERPL-SCNC: 28 MMOL/L — SIGNIFICANT CHANGE UP (ref 22–29)
CREAT SERPL-MCNC: 0.56 MG/DL — SIGNIFICANT CHANGE UP (ref 0.5–1.3)
CULTURE RESULTS: SIGNIFICANT CHANGE UP
EGFR: 96 ML/MIN/1.73M2 — SIGNIFICANT CHANGE UP
GLUCOSE BLDC GLUCOMTR-MCNC: 106 MG/DL — HIGH (ref 70–99)
GLUCOSE BLDC GLUCOMTR-MCNC: 114 MG/DL — HIGH (ref 70–99)
GLUCOSE SERPL-MCNC: 139 MG/DL — HIGH (ref 70–99)
HCT VFR BLD CALC: 34.4 % — LOW (ref 34.5–45)
HGB BLD-MCNC: 10.6 G/DL — LOW (ref 11.5–15.5)
INR BLD: 1.25 RATIO — HIGH (ref 0.88–1.16)
MAGNESIUM SERPL-MCNC: 1.8 MG/DL — SIGNIFICANT CHANGE UP (ref 1.6–2.6)
MCHC RBC-ENTMCNC: 29.4 PG — SIGNIFICANT CHANGE UP (ref 27–34)
MCHC RBC-ENTMCNC: 30.8 GM/DL — LOW (ref 32–36)
MCV RBC AUTO: 95.6 FL — SIGNIFICANT CHANGE UP (ref 80–100)
PLATELET # BLD AUTO: 306 K/UL — SIGNIFICANT CHANGE UP (ref 150–400)
POTASSIUM SERPL-MCNC: 3.6 MMOL/L — SIGNIFICANT CHANGE UP (ref 3.5–5.3)
POTASSIUM SERPL-SCNC: 3.6 MMOL/L — SIGNIFICANT CHANGE UP (ref 3.5–5.3)
PROTHROM AB SERPL-ACNC: 14.5 SEC — HIGH (ref 10.5–13.4)
RBC # BLD: 3.6 M/UL — LOW (ref 3.8–5.2)
RBC # FLD: 14 % — SIGNIFICANT CHANGE UP (ref 10.3–14.5)
SODIUM SERPL-SCNC: 138 MMOL/L — SIGNIFICANT CHANGE UP (ref 135–145)
SPECIMEN SOURCE: SIGNIFICANT CHANGE UP
WBC # BLD: 6.64 K/UL — SIGNIFICANT CHANGE UP (ref 3.8–10.5)
WBC # FLD AUTO: 6.64 K/UL — SIGNIFICANT CHANGE UP (ref 3.8–10.5)

## 2022-12-23 PROCEDURE — 97163 PT EVAL HIGH COMPLEX 45 MIN: CPT

## 2022-12-23 PROCEDURE — 74177 CT ABD & PELVIS W/CONTRAST: CPT

## 2022-12-23 PROCEDURE — 83735 ASSAY OF MAGNESIUM: CPT

## 2022-12-23 PROCEDURE — 93325 DOPPLER ECHO COLOR FLOW MAPG: CPT

## 2022-12-23 PROCEDURE — 80053 COMPREHEN METABOLIC PANEL: CPT

## 2022-12-23 PROCEDURE — 81001 URINALYSIS AUTO W/SCOPE: CPT

## 2022-12-23 PROCEDURE — 87640 STAPH A DNA AMP PROBE: CPT

## 2022-12-23 PROCEDURE — 93306 TTE W/DOPPLER COMPLETE: CPT

## 2022-12-23 PROCEDURE — 71045 X-RAY EXAM CHEST 1 VIEW: CPT | Mod: 26

## 2022-12-23 PROCEDURE — 82962 GLUCOSE BLOOD TEST: CPT

## 2022-12-23 PROCEDURE — 93005 ELECTROCARDIOGRAM TRACING: CPT

## 2022-12-23 PROCEDURE — 85027 COMPLETE CBC AUTOMATED: CPT

## 2022-12-23 PROCEDURE — U0005: CPT

## 2022-12-23 PROCEDURE — 86900 BLOOD TYPING SEROLOGIC ABO: CPT

## 2022-12-23 PROCEDURE — 85610 PROTHROMBIN TIME: CPT

## 2022-12-23 PROCEDURE — 83880 ASSAY OF NATRIURETIC PEPTIDE: CPT

## 2022-12-23 PROCEDURE — 99232 SBSQ HOSP IP/OBS MODERATE 35: CPT

## 2022-12-23 PROCEDURE — 94760 N-INVAS EAR/PLS OXIMETRY 1: CPT

## 2022-12-23 PROCEDURE — 85576 BLOOD PLATELET AGGREGATION: CPT

## 2022-12-23 PROCEDURE — 93460 R&L HRT ART/VENTRICLE ANGIO: CPT

## 2022-12-23 PROCEDURE — 87040 BLOOD CULTURE FOR BACTERIA: CPT

## 2022-12-23 PROCEDURE — 84134 ASSAY OF PREALBUMIN: CPT

## 2022-12-23 PROCEDURE — C1887: CPT

## 2022-12-23 PROCEDURE — 86901 BLOOD TYPING SEROLOGIC RH(D): CPT

## 2022-12-23 PROCEDURE — 71250 CT THORAX DX C-: CPT

## 2022-12-23 PROCEDURE — C1769: CPT

## 2022-12-23 PROCEDURE — 87077 CULTURE AEROBIC IDENTIFY: CPT

## 2022-12-23 PROCEDURE — 71045 X-RAY EXAM CHEST 1 VIEW: CPT

## 2022-12-23 PROCEDURE — 86850 RBC ANTIBODY SCREEN: CPT

## 2022-12-23 PROCEDURE — 93351 STRESS TTE COMPLETE: CPT | Mod: 26

## 2022-12-23 PROCEDURE — 84484 ASSAY OF TROPONIN QUANT: CPT

## 2022-12-23 PROCEDURE — 85730 THROMBOPLASTIN TIME PARTIAL: CPT

## 2022-12-23 PROCEDURE — 0225U NFCT DS DNA&RNA 21 SARSCOV2: CPT

## 2022-12-23 PROCEDURE — 93320 DOPPLER ECHO COMPLETE: CPT

## 2022-12-23 PROCEDURE — 94640 AIRWAY INHALATION TREATMENT: CPT

## 2022-12-23 PROCEDURE — 94010 BREATHING CAPACITY TEST: CPT

## 2022-12-23 PROCEDURE — 85025 COMPLETE CBC W/AUTO DIFF WBC: CPT

## 2022-12-23 PROCEDURE — 99233 SBSQ HOSP IP/OBS HIGH 50: CPT

## 2022-12-23 PROCEDURE — 99285 EMERGENCY DEPT VISIT HI MDM: CPT | Mod: 25

## 2022-12-23 PROCEDURE — 36415 COLL VENOUS BLD VENIPUNCTURE: CPT

## 2022-12-23 PROCEDURE — 82803 BLOOD GASES ANY COMBINATION: CPT

## 2022-12-23 PROCEDURE — 93312 ECHO TRANSESOPHAGEAL: CPT

## 2022-12-23 PROCEDURE — 84443 ASSAY THYROID STIM HORMONE: CPT

## 2022-12-23 PROCEDURE — 93351 STRESS TTE COMPLETE: CPT

## 2022-12-23 PROCEDURE — C1894: CPT

## 2022-12-23 PROCEDURE — 99231 SBSQ HOSP IP/OBS SF/LOW 25: CPT

## 2022-12-23 PROCEDURE — U0003: CPT

## 2022-12-23 PROCEDURE — 87086 URINE CULTURE/COLONY COUNT: CPT

## 2022-12-23 PROCEDURE — 99238 HOSP IP/OBS DSCHRG MGMT 30/<: CPT

## 2022-12-23 PROCEDURE — 83036 HEMOGLOBIN GLYCOSYLATED A1C: CPT

## 2022-12-23 PROCEDURE — 80048 BASIC METABOLIC PNL TOTAL CA: CPT

## 2022-12-23 PROCEDURE — 87641 MR-STAPH DNA AMP PROBE: CPT

## 2022-12-23 PROCEDURE — 87186 SC STD MICRODIL/AGAR DIL: CPT

## 2022-12-23 PROCEDURE — 84439 ASSAY OF FREE THYROXINE: CPT

## 2022-12-23 RX ORDER — METOPROLOL TARTRATE 50 MG
1 TABLET ORAL
Qty: 30 | Refills: 1
Start: 2022-12-23 | End: 2023-02-20

## 2022-12-23 RX ORDER — ACETAMINOPHEN 500 MG
3 TABLET ORAL
Qty: 0 | Refills: 0 | DISCHARGE
Start: 2022-12-23

## 2022-12-23 RX ORDER — RIVAROXABAN 15 MG-20MG
1 KIT ORAL
Qty: 30 | Refills: 1
Start: 2022-12-23 | End: 2023-02-20

## 2022-12-23 RX ORDER — MAGNESIUM SULFATE 500 MG/ML
2 VIAL (ML) INJECTION ONCE
Refills: 0 | Status: COMPLETED | OUTPATIENT
Start: 2022-12-23 | End: 2022-12-23

## 2022-12-23 RX ORDER — APIXABAN 2.5 MG/1
5 TABLET, FILM COATED ORAL EVERY 12 HOURS
Refills: 0 | Status: DISCONTINUED | OUTPATIENT
Start: 2022-12-23 | End: 2022-12-23

## 2022-12-23 RX ORDER — AMIODARONE HYDROCHLORIDE 400 MG/1
1 TABLET ORAL
Qty: 30 | Refills: 1
Start: 2022-12-23 | End: 2023-02-20

## 2022-12-23 RX ORDER — WARFARIN SODIUM 2.5 MG/1
1 TABLET ORAL
Qty: 0 | Refills: 0 | DISCHARGE

## 2022-12-23 RX ORDER — APIXABAN 2.5 MG/1
1 TABLET, FILM COATED ORAL
Qty: 60 | Refills: 1
Start: 2022-12-23 | End: 2023-02-20

## 2022-12-23 RX ORDER — APIXABAN 2.5 MG/1
1 TABLET, FILM COATED ORAL
Qty: 0 | Refills: 0 | DISCHARGE
Start: 2022-12-23

## 2022-12-23 RX ORDER — WARFARIN SODIUM 2.5 MG/1
5 TABLET ORAL ONCE
Refills: 0 | Status: DISCONTINUED | OUTPATIENT
Start: 2022-12-23 | End: 2022-12-23

## 2022-12-23 RX ORDER — SPIRONOLACTONE 25 MG/1
1 TABLET, FILM COATED ORAL
Qty: 30 | Refills: 1
Start: 2022-12-23 | End: 2023-02-20

## 2022-12-23 RX ORDER — POTASSIUM CHLORIDE 20 MEQ
20 PACKET (EA) ORAL ONCE
Refills: 0 | Status: COMPLETED | OUTPATIENT
Start: 2022-12-23 | End: 2022-12-23

## 2022-12-23 RX ORDER — APIXABAN 2.5 MG/1
5 TABLET, FILM COATED ORAL
Qty: 300 | Refills: 1
Start: 2022-12-23 | End: 2023-02-20

## 2022-12-23 RX ADMIN — SODIUM CHLORIDE 3 MILLILITER(S): 9 INJECTION INTRAMUSCULAR; INTRAVENOUS; SUBCUTANEOUS at 13:09

## 2022-12-23 RX ADMIN — HEPARIN SODIUM 2000 UNIT(S)/HR: 5000 INJECTION INTRAVENOUS; SUBCUTANEOUS at 06:56

## 2022-12-23 RX ADMIN — GABAPENTIN 800 MILLIGRAM(S): 400 CAPSULE ORAL at 13:11

## 2022-12-23 RX ADMIN — ZOLPIDEM TARTRATE 5 MILLIGRAM(S): 10 TABLET ORAL at 00:16

## 2022-12-23 RX ADMIN — Medication 250 MILLIGRAM(S): at 05:24

## 2022-12-23 RX ADMIN — Medication 40 MILLIGRAM(S): at 05:24

## 2022-12-23 RX ADMIN — Medication 2 UNIT(S): at 12:42

## 2022-12-23 RX ADMIN — EZETIMIBE 10 MILLIGRAM(S): 10 TABLET ORAL at 13:12

## 2022-12-23 RX ADMIN — AMIODARONE HYDROCHLORIDE 200 MILLIGRAM(S): 400 TABLET ORAL at 05:25

## 2022-12-23 RX ADMIN — HEPARIN SODIUM 2000 UNIT(S)/HR: 5000 INJECTION INTRAVENOUS; SUBCUTANEOUS at 07:45

## 2022-12-23 RX ADMIN — APIXABAN 5 MILLIGRAM(S): 2.5 TABLET, FILM COATED ORAL at 15:07

## 2022-12-23 RX ADMIN — SODIUM CHLORIDE 3 MILLILITER(S): 9 INJECTION INTRAMUSCULAR; INTRAVENOUS; SUBCUTANEOUS at 08:11

## 2022-12-23 RX ADMIN — Medication 20 MILLIEQUIVALENT(S): at 10:26

## 2022-12-23 RX ADMIN — MEROPENEM 1000 MILLIGRAM(S): 1 INJECTION INTRAVENOUS at 05:24

## 2022-12-23 RX ADMIN — MEROPENEM 1000 MILLIGRAM(S): 1 INJECTION INTRAVENOUS at 13:12

## 2022-12-23 RX ADMIN — Medication 25 GRAM(S): at 10:26

## 2022-12-23 RX ADMIN — SPIRONOLACTONE 25 MILLIGRAM(S): 25 TABLET, FILM COATED ORAL at 05:24

## 2022-12-23 RX ADMIN — Medication 975 MILLIGRAM(S): at 00:41

## 2022-12-23 RX ADMIN — Medication 250 MILLIGRAM(S): at 17:09

## 2022-12-23 RX ADMIN — GABAPENTIN 800 MILLIGRAM(S): 400 CAPSULE ORAL at 05:24

## 2022-12-23 NOTE — PROGRESS NOTE ADULT - SUBJECTIVE AND OBJECTIVE BOX
Subjective: Feels well     TELE: Normal sinus rhythm    MEDICATIONS  (STANDING):  aMIOdarone    Tablet   Oral   aMIOdarone    Tablet 200 milliGRAM(s) Oral daily  atorvastatin 80 milliGRAM(s) Oral at bedtime  dextrose 50% Injectable 25 Gram(s) IV Push once  dextrose 50% Injectable 12.5 Gram(s) IV Push once  dextrose 50% Injectable 25 Gram(s) IV Push once  dextrose Oral Gel 15 Gram(s) Oral once  ezetimibe 10 milliGRAM(s) Oral daily  furosemide    Tablet 40 milliGRAM(s) Oral daily  gabapentin 800 milliGRAM(s) Oral three times a day  glucagon  Injectable 1 milliGRAM(s) IntraMuscular once  heparin  Infusion.  Unit(s)/Hr (20 mL/Hr) IV Continuous <Continuous>  insulin lispro (ADMELOG) corrective regimen sliding scale   SubCutaneous Before meals and at bedtime  insulin lispro Injectable (ADMELOG) 2 Unit(s) SubCutaneous three times a day before meals  meropenem Injectable 1000 milliGRAM(s) IV Push every 8 hours  metoprolol succinate  milliGRAM(s) Oral daily  montelukast 10 milliGRAM(s) Oral at bedtime  saccharomyces boulardii 250 milliGRAM(s) Oral two times a day  sodium chloride 0.9% lock flush 3 milliLiter(s) IV Push every 8 hours  spironolactone 25 milliGRAM(s) Oral daily    MEDICATIONS  (PRN):  acetaminophen     Tablet .. 975 milliGRAM(s) Oral every 8 hours PRN Mild Pain (1 - 3)  albuterol/ipratropium for Nebulization 3 milliLiter(s) Nebulizer every 6 hours PRN Wheezing  zolpidem 5 milliGRAM(s) Oral at bedtime PRN Insomnia      Allergies    IV DYE, IODINE CONTRAST (Unknown)  latex (Unknown)  penicillin (Hives; Urticaria)  pregabalin (Unknown)    Intolerances        Vital Signs Last 24 Hrs  T(C): 36.4 (23 Dec 2022 10:00), Max: 36.8 (22 Dec 2022 15:26)  T(F): 97.5 (23 Dec 2022 10:00), Max: 98.3 (22 Dec 2022 15:26)  HR: 63 (23 Dec 2022 10:00) (56 - 82)  BP: 121/79 (23 Dec 2022 10:00) (101/63 - 133/63)  BP(mean): --  RR: 17 (23 Dec 2022 10:00) (17 - 18)  SpO2: 93% (23 Dec 2022 10:00) (92% - 100%)    Parameters below as of 23 Dec 2022 10:00  Patient On (Oxygen Delivery Method): room air        Physical Exam:  Constitutional: NAD, AAOx3  Cardiovascular: +S1S2 RRR  Pulmonary: CTA b/l, unlabored  GI: soft NTND +BS  Extremities: no pedal edema, +distal pulses b/l  Neuro: non focal, CAPPS x4    LABS:                        10.6   6.64  )-----------( 306      ( 23 Dec 2022 04:25 )             34.4     12-23    138  |  102  |  16.9  ----------------------------<  139<H>  3.6   |  28.0  |  0.56    Ca    8.5      23 Dec 2022 04:25  Mg     1.8     12-23      PTT - ( 23 Dec 2022 04:25 )  PTT:81.7 sec      RADIOLOGY & ADDITIONAL TESTS:

## 2022-12-23 NOTE — PROGRESS NOTE ADULT - ASSESSMENT
72 year old female former smoker with PMH of morbid obesity, Aortic Stenosis, HTN, HLD, type 2 DM (HA1c 6.3 on Metformin and Farxiga), LE cellulitis, bacteremia, recently admitted to [facility] 11/30-12/8 of ESBL E.Coli and Proteus UTI, lap band (since removed), chronic LE venous stasis with vascular ulcers (followed by Dr. Yoo), presented from home 12/14/22 with complaints of worsening JETT with light exertion and lower extremity edema with associated palpitations. Patient follows with Cardiologist Dr. Mayer and was advised to seek medical attention for recent outpatient TTE demonstrating severe aortic stenosis. Patient admitted to CT Surgery service under Dr. Hopper for TAVR evaluation. Repeat TTE on admission showing Moderate Aortic stenosis with an ADRIAN of 1.1 with low gradients. Right and Left heart catheterization performed 12/20/22 per heart failure and pulmonary recommendations to r/o pulmonary HTN. Patient underwent ASHLEY 12/21/22 which showed severe AS by planimetry area (< 1cm) however dimensionless index 0.26 and MG 10mmg more consistent with moderate AS. Now s/p successful ASHLEY/cardioversion 12/22/22 with restoration of NSR. Plan for dobutamine stress test today 12/23 for further workup.

## 2022-12-23 NOTE — DISCHARGE NOTE PROVIDER - CARE PROVIDERS DIRECT ADDRESSES
,[email or web address],DirectAddress_Unknown,DirectAddress_Unknown,DirectAddress_Unknown

## 2022-12-23 NOTE — PROGRESS NOTE ADULT - PROBLEM SELECTOR PLAN 3
-Continue Verapamil for now (recently switched from Diltiazem due to cost).  -EP following. S/p DCCV. Continue amiodarone.  -Maintain K>4, Mg>2  -AC: Switch heparin to Eliquis 5mg BID.

## 2022-12-23 NOTE — PROGRESS NOTE ADULT - PROBLEM SELECTOR PLAN 1
Patient with Severe AS on outpatient TTE with associated JETT.    Admitted for TAVR evaluation and chronic diastolic heart failure exacerbation.  Repeat TTE showing Moderate AS with an ADRIAN of 1.1 and low gradients.   Patient with dyspnea and new O2 requirements since admission.   Pulmonology following.   Heart failure team following.   Patient appears euvolemic.   Lasix 40PO QD and Aldactone for Heart failure regimen.    Continue Toprol as tolerated by HR and BP.   S/p RHC/ LHC 12/20/22 revealing PCW 28mmHg, PA 63/32 with mean 46mmHg, PA sat 53%, CO/CI 6.4/2.98, no significant LV/AO gradient, +CAD. Formal report pending.    Patient may be a candidate for Entresto and/or Cardiomems as an outpatient. R/LHC to be reviewed.   ASHLEY 12/21/22 showed severe AS by planimetry area (< 1cm) however dimensionless index 0.26 and MG 10mmg more consistent with moderate AS.   Plan for Dobutamine stress test today 12/23 as per Heart failure for further evaluation.  Plan to be discussed / reviewed with CT Surgery attending / team during AM rounds.

## 2022-12-23 NOTE — PROGRESS NOTE ADULT - PROBLEM SELECTOR PLAN 2
-Only moderate on TTE (ADRIAN 1.1, PPG 36.5, DI 0.36). No significant gradient LHC. ASHLEY showed ADRIAN 0.8, DI 0.26 MG not.  -Dobutamine stress echo this morning was suboptimal based on HR response however pt did elicit MG 33, Vmax 3.9cm. Will plan for redo as outpt. Will need to hold beta blocker for minimum of two days.

## 2022-12-23 NOTE — PROGRESS NOTE ADULT - PROBLEM SELECTOR PLAN 7
Heparin gtt and SCDs for DVT prophylaxis.  Protonix for GI prophylaxis.

## 2022-12-23 NOTE — PROGRESS NOTE ADULT - PROBLEM SELECTOR PROBLEM 6
UTI due to extended-spectrum beta lactamase (ESBL) producing Escherichia coli

## 2022-12-23 NOTE — PROGRESS NOTE ADULT - PROBLEM SELECTOR PLAN 5
Continue Statin and Zetia.

## 2022-12-23 NOTE — PROGRESS NOTE ADULT - PROBLEM SELECTOR PROBLEM 2
Atrial fibrillation
Aortic stenosis
Atrial fibrillation
Aortic stenosis
Aortic stenosis
Atrial fibrillation
Aortic stenosis
Aortic stenosis
Atrial fibrillation

## 2022-12-23 NOTE — PROGRESS NOTE ADULT - NS_MD_PANP_GEN_ALL_CORE
Tried calling pt and lm on vm for pt to cb. Received info from [employer] that they have tried contacting pt in multiple attempts without any success.   
Attending and PA/NP shared services statement (NON-critical care):

## 2022-12-23 NOTE — PROGRESS NOTE ADULT - ASSESSMENT
HF Attending: Dr. Stevens   Primary Cardiologist: Dr. Mayer ([place])    73 y/o female, former smoker, with HFpEF (LVEF 50-55%), aortic stenosis, atrial fibrillation, HTN, HLD, DM (controlled on Metformin), chronic PVD/prior vascular ulcers (followed by Dr. Yoo), morbid obesity/prior lap band (since removed).  Patient endorses progressive dyspnea since September/October. She had a recent prolonged hospitalization at  where she was treated for cellulitis and bacteremia. She was discharged home Friday 12/9 and presented to her primary cardiologist's office for f/u on 12/13 where she was noted to have significant SOB and was referred to [facility]. CTA negative for  PE on 11/29/22. Admission labwork remarkable for serum pBNP 1023, INR 4. Negative trops.  She has been treated with intermittent IV diuresis and notes significant improvement in LE edema. However, she continues to endorse orthopnea and was only able to walk ~10 feet with PT due to SOB. Also being treated with Merrem for Ecoli ESBL in urine. She was febrile 12/18 temp 102.2 without leukocytosis.   ASHLEY 12/21  S/p successful DCCV 12/22  Will plan for redo dobutamine stress echo as outpatient.    Cardiac Testing  TTE 12/14/22: LVEF 50-55%, LVIDd 5.11cm, IVSd 1.2cm, mild LAE, severe AGUILA, moderate RVE with low/normal RV function, mild AI, moderate AS (MG 23, PV 3.02m/s), mild MR, moderate TR, PASP 41.4 mmHg (RAP 3 mmHg).  RHC: full report pending, RA ?, PA 63/32 (46), PCWP 18, CO 6.4/ CI 2.98, PA sat 53%.  LHC: full report pending

## 2022-12-23 NOTE — PROGRESS NOTE ADULT - PROBLEM SELECTOR PLAN 6
E.Coli ESBL UTI 12/15/22.  Continue Merrem until 12/26/22.

## 2022-12-23 NOTE — PROGRESS NOTE ADULT - PROBLEM SELECTOR PROBLEM 1
Aortic stenosis
Aortic stenosis
Acute diastolic congestive heart failure
Acute diastolic congestive heart failure
Aortic stenosis
Acute diastolic congestive heart failure
Aortic stenosis
Acute diastolic congestive heart failure
Aortic stenosis
Acute diastolic congestive heart failure
Aortic stenosis

## 2022-12-23 NOTE — DISCHARGE NOTE PROVIDER - CARE PROVIDER_API CALL
Venkata Hopper)  Surgery; Thoracic and Cardiac Surgery  [address] [place] [address]  Phone: [phone]  Fax: [phone]  Follow Up Time:     Rocky Estes)  Cardiac Electrophysiology; Cardiovascular Disease; Internal Medicine  [address], [address]  [place] [address]  Phone: [phone]  Fax: [phone]  Follow Up Time:     Marylu Stevens)  Cardiovascular Disease; Internal Medicine  [address] [place] [address]  Phone: [phone]  Follow Up Time:     Justo Mayer  CARDIOVASCULAR DISEASE  [address], [address]  [place] [address]  Phone: [phone]  Fax: [phone]  Follow Up Time:

## 2022-12-23 NOTE — PROGRESS NOTE ADULT - PROBLEM SELECTOR PROBLEM 3
Atrial fibrillation
HTN (hypertension)
Atrial fibrillation
Diabetes mellitus type II, controlled
HTN (hypertension)
Atrial fibrillation
Atrial fibrillation
HTN (hypertension)
Atrial fibrillation
HTN (hypertension)
Acute diastolic congestive heart failure
HTN (hypertension)
Diabetes mellitus type II, controlled
HTN (hypertension)
Diabetes mellitus type II, controlled

## 2022-12-23 NOTE — PROGRESS NOTE ADULT - NS ATTEND AMEND GEN_ALL_CORE FT
Patient was seen and examined at bedside with the advanced care provider.    s/p Dobutamine stress echo this AM. Max heart rate achieved was 90 (MHPR 69%) wtih MG 33mmHG and Vmax 3.9cm.  She may have an some residual beta blocker effect.  However, these gradients fall short of severe AS.  We will plan for repeat dobutamine SE as an outpatient with longer beta blocker wash out[ 2- 3 days.  She appears euvolemic on exam today.  Transitioning to DOAC/ Eliquis for AC.  Discharge planning today. Patient was seen and examined at bedside with the advanced care provider.    s/p Dobutamine stress echo this AM. Max heart rate achieved was 90 (MHPR 69%) wtih MG 33mmHG and Vmax 3.9cm.  She may have an some residual beta blocker effect.  However, these gradients fall short of severe AS.  Next steps would be to repeat dobutamine SE with longer beta blocker wash out (2- 3 days) or obtain CTA for calcium score of aortic valve.  Will defer plan to CT surgery.  She appears euvolemic on exam today.  Transitioning to DOAC/ Eliquis for AC.

## 2022-12-23 NOTE — PROGRESS NOTE ADULT - REASON FOR ADMISSION
Severe AS
AS
Severe AS

## 2022-12-23 NOTE — PROGRESS NOTE ADULT - PROBLEM SELECTOR PLAN 4
HA1c 6.3 on Metformin and Farxiga as an outpatient.   Holding oral antihyperglycemic in inpatient setting.  Fingersticks AC/HS with ISS ordered for BG coverage.   Consider endocrine consult if needed.
Continue Toprol as tolerated by HR and BP.
HA1c 6.3 on Metformin and Farxiga as an outpatient.   Holding oral antihyperglycemic in inpatient setting.  Fingersticks AC/HS with ISS ordered for BG coverage.   Consider endocrine consult if needed.
HA1c 6.3 on Metformin and Farxiga as an outpatient.   Holding oral antihyperglycemic in inpatient setting.  Fingersticks AC/HS with ISS ordered for BG coverage.   Consider endocrine consult if needed.
Continue Toprol as tolerated by HR and BP.
Continue Toprol as tolerated by HR and BP.
HA1c 6.3 on Metformin and Farxiga as an outpatient.   Holding oral antihyperglycemic in inpatient setting.  Fingersticks AC/HS with ISS ordered for BG coverage.   Consider endocrine consult if needed.

## 2022-12-23 NOTE — PROGRESS NOTE ADULT - SUBJECTIVE AND OBJECTIVE BOX
Evaluation for Aortic stenosis    PAST MEDICAL & SURGICAL HISTORY:  Diabetes mellitus type II, controlled  Atrial fibrillation  Congestive heart failure  Dyspnea  Morbid obesity with BMI of 40.0-44.9, adult  Neuropathy  Peripheral venous insufficiency  Thyroid nodule  HTN (hypertension)  Cellulitis  History of esophagogastroduodenoscopy (EGD)  H/O colonoscopy  Other complications of gastric band procedure  S/P left knee arthroscopy  Status post medial meniscus repair    FAMILY HISTORY:  Family history of breast cancer in mother  Family history of diabetes mellitus in mother    Brief Hospital Course: 72 year old female former smoker with PMH of morbid obesity, Aortic Stenosis, HTN, HLD, type 2 DM (HA1c 6.3 on Metformin and Farxiga), LE cellulitis, bacteremia, recently admitted to [facility] 11/30-12/8 of ESBL E.Coli and Proteus UTI, lap band (since removed), chronic LE venous stasis with vascular ulcers (followed by Dr. Yoo), presented from home 12/14/22 with complaints of worsening JETT with light exertion and lower extremity edema with associated palpitations. Patient follows with Cardiologist Dr. Mayer and was advised to seek medical attention for recent outpatient TTE demonstrating severe aortic stenosis. Patient admitted to CT Surgery service under Dr. Hopper for TAVR evaluation. Repeat TTE on admission showing Moderate Aortic stenosis with an ADRIAN of 1.1 with low gradients. Right and Left heart catheterization performed 12/20/22 per heart failure and pulmonary recommendations to r/o pulmonary HTN. Patient underwent ASHLEY 12/21/22 which showed severe AS by planimetry area (< 1cm) however dimensionless index 0.26 and MG 10mmg more consistent with moderate AS. Now s/p successful ASHLEY/cardioversion 12/22/22 with restoration of NSR. Plan for dobutamine stress test today 12/23 for further workup.     Significant recent/past 24 hr events: No overnight events reported.    Subjective: Patient lying in bed in NAD. +Passing BMs. +JETT. Expressing relief that her cardioversion went well yesterday. No pain elicited at this time. Denies fevers, chills, lightheadedness, dizziness, HA, CP, palpitations, SOB, cough, abdominal pain, N/V, diarrhea, numbness/tingling in extremities, or any other acute complaints. ROS negative x 10 systems except as noted above.    MEDICATIONS  (STANDING):  aMIOdarone    Tablet 200 milliGRAM(s) Oral daily  atorvastatin 80 milliGRAM(s) Oral at bedtime  ezetimibe 10 milliGRAM(s) Oral daily  furosemide    Tablet 40 milliGRAM(s) Oral daily  gabapentin 800 milliGRAM(s) Oral three times a day  heparin  Infusion.  Unit(s)/Hr (20 mL/Hr) IV Continuous  influenza  Vaccine (HIGH DOSE) 0.7 milliLiter(s) IntraMuscular once  insulin lispro (ADMELOG) corrective regimen sliding scale   SubCutaneous Before meals and at bedtime  insulin lispro Injectable (ADMELOG) 2 Unit(s) SubCutaneous three times a day before meals  meropenem Injectable 1000 milliGRAM(s) IV Push every 8 hours  metoprolol succinate  milliGRAM(s) Oral daily  montelukast 10 milliGRAM(s) Oral at bedtime  saccharomyces boulardii 250 milliGRAM(s) Oral two times a day  sodium chloride 0.9% lock flush 3 milliLiter(s) IV Push every 8 hours  spironolactone 25 milliGRAM(s) Oral daily    MEDICATIONS  (PRN):  acetaminophen     Tablet .. 975 milliGRAM(s) Oral every 8 hours PRN Mild Pain (1 - 3)  albuterol/ipratropium for Nebulization 3 milliLiter(s) Nebulizer every 6 hours PRN Wheezing  zolpidem 5 milliGRAM(s) Oral at bedtime PRN Insomnia    Allergies:  IV DYE, IODINE CONTRAST (Unknown)  latex (Unknown)  penicillin (Hives; Urticaria)  pregabalin (Unknown)    Vitals   T(C): 36.7 (23 Dec 2022 00:17), Max: 36.8 (22 Dec 2022 10:10)  T(F): 98.1 (23 Dec 2022 00:17), Max: 98.3 (22 Dec 2022 15:26)  HR: 82 (23 Dec 2022 00:17) (59 - 86)  BP: 123/69 (23 Dec 2022 00:17) (108/48 - 133/63)  RR: 18 (23 Dec 2022 00:17) (17 - 18)  SpO2: 99% (23 Dec 2022 00:17) (92% - 99%)  O2 Parameters below as of 23 Dec 2022 00:17  Patient On (Oxygen Delivery Method): room air    I&O's Detail    21 Dec 2022 07:01  -  22 Dec 2022 07:00  --------------------------------------------------------  IN:    Heparin Infusion: 326 mL    Oral Fluid: 240 mL  Total IN: 566 mL    OUT:    Voided (mL): 1300 mL  Total OUT: 1300 mL    Total NET: -734 mL      22 Dec 2022 07:01  -  23 Dec 2022 01:36  --------------------------------------------------------  IN:    Heparin Infusion: 240 mL    IV PiggyBack: 50 mL  Total IN: 290 mL    OUT:    Voided (mL): 2250 mL  Total OUT: 2250 mL    Total NET: -1960 mL    Physical Exam  Neuro: A+O x 3, non-focal, speech clear and intact  HEENT:  NCAT, No conjuctival edema or icterus, no thrush.    Neck:  Supple, trachea midline  Pulm: CTA bilaterally, no accessory muscle use noted  CV: regular rate, regular rhythm, +S1S2, +murmur  Abd: soft, NT, ND, + BS  Ext: CAPPS x 4, trace LE edema b/l with chronic LE skin changes/ hyperpigmentation from chronic venous insufficiency, no cyanosis, overall distal motor/neuro/circ intact  Skin: warm, dry, perfused    LABS                        11.1   7.22  )-----------( 315      ( 22 Dec 2022 06:45 )             35.8     12-22    139  |  101  |  14.5  ----------------------------<  93  3.8   |  28.0  |  0.42<L>    Ca    8.7      22 Dec 2022 06:45  Mg     1.6     12-22    PTT - ( 22 Dec 2022 13:22 )  PTT:66.7 sec    POCT Blood Glucose.: 137 mg/dL (12-22-22 @ 22:17)  POCT Blood Glucose.: 179 mg/dL (12-22-22 @ 16:59)  POCT Blood Glucose.: 96 mg/dL (12-22-22 @ 07:57)      Last CXR:  < from: Xray Chest 1 View- PORTABLE-Routine (Xray Chest 1 View- PORTABLE-Routine in AM.) (12.22.22 @ 05:50) >  IMPRESSION:  No acute radiographic findings, resolution of perihilar infiltrates and pulmonary venous congestion  < end of copied text >    ASHLEY:  < from: ASHLEY Echo Doppler (12.21.22 @ 12:18) >  Summary:   1. Left ventricular ejection fraction, by visual estimation, is 55 to 60%.   2. Normal global left ventricular systolic function.   3. Increased LV wall thickness.   4. Left atrial enlargement.   5. Significant spontaneous echo contrast noted in LA and ECHO.   6. Right atrial enlargement.   7. Color flow doppler and intravenous injection of agitated saline demonstrates the presence of an intact intra atrial septum.   8. Mild mitral annular calcification.   9. Mild mitral valve regurgitation.  10. Moderate tricuspid regurgitation.  11. Severe aortic valve stenosis.  12. ADRIAN by planimetry = 0.9 cm2. Calculated ADRIAN by VTI = 0.8cm2. DI = 0.26.  < end of copied text >

## 2022-12-23 NOTE — PROGRESS NOTE ADULT - NS ATTEND OPT1 GEN_ALL_CORE

## 2022-12-23 NOTE — PROGRESS NOTE ADULT - PROBLEM SELECTOR PROBLEM 4
Diabetes mellitus type II, controlled
HTN (hypertension)

## 2022-12-23 NOTE — PROGRESS NOTE ADULT - ASSESSMENT
72 year old female, former smoker, with PMH of HFpEF (LVEF 50-55%), aortic stenosis, persistent atrial fibrillation (s/p prior DCCV x 2; both unsuccessful- last ~3 years ago), HTN, hyperlipidemia, DM type 2, chronic PVD/prior vascular ulcers and morbid obesity/prior lap band (since removed). She recently had worsening JETT with light exertion and lower extremity edema. Outpatient TTE showed worsening aortic stenosis and she was referred to [facility] by Dr. George for further evaluation and consideration for TAVR. Repeat echo demonstrated moderate AS, LHC on 12/20 showed no significant aortic valve gradients, some CAD noted. Symptoms were out of proportion to severity of AS. LHC was consistent with only moderate AS. ASHLEY did not demonstrate any ECHO thrombus. Underwent successful DCCV yesterday (patient was started on amiodarone prior to cardioversion. Continues to maintain sinus rhythm.     Recommendations:  - Continue amiodarone 200 mg daily  - Continue metoprolol 100 mg daily  - Continue warfarin  - IV heparin bridge until INR is therapeutic  - Outpatient follow up with me to discuss catheter ablation for AF    We will sign off. Please call with questions.

## 2022-12-23 NOTE — PROGRESS NOTE ADULT - PROBLEM SELECTOR PLAN 2
S/p successful ASHLEY/ DCCV 12/22/22 with restoration of NSR.   Continue Amio 200QD and Toprol 100QD as tolerated by HR and BP.   Restart Verapamil as clinically indicated.    On coumadin at home for chemical anticoagulation.   Plan for Heparin gtt bridge to Coumadin once OK with Surgeon.

## 2022-12-23 NOTE — PROGRESS NOTE ADULT - PROBLEM SELECTOR PLAN 1
Spoke to mom   Mom is trying to coordinate getting labs drawn when patient gets an appointment with an allergist   Mom stated per [facility] labs can wait until allergist orders labs -LVEF 50-55%, LVIDd 5.11, moderate RVE with low normal RV function  -NYHA class IIIB symptoms   -Clinically appears close to euvolemia on exam.  Elevated left sided filling pressures consistent with HFpEF diagnosis.  RA pressures not yet available to review post cath.    -No longer on Jardiance in setting of recurrent UTI.   -Diuretics: Lasix 40mg daily.   -Continue Toprol-XL 100mg daily, Verapamil 120mg BID and Spironolactone 25mg daily.  -May be able to consider Entresto therapy for her for HFpEF as an outpt.    -She may be a candidate for a CardioMEMS device as an outpatient.

## 2022-12-23 NOTE — PROGRESS NOTE ADULT - PROVIDER SPECIALTY LIST ADULT
Electrophysiology
Electrophysiology
Pulmonology
Electrophysiology
Electrophysiology
Infectious Disease
Infectious Disease
Pulmonology
Pulmonology
Electrophysiology
Pulmonology
Pulmonology
CT Surgery
Heart Failure
Cardiology
Heart Failure
CT Surgery
Heart Failure
CT Surgery
Heart Failure
Heart Failure
CT Surgery
Heart Failure
CT Surgery

## 2022-12-23 NOTE — DISCHARGE NOTE PROVIDER - NSDCMRMEDTOKEN_GEN_ALL_CORE_FT
acetaminophen 325 mg oral tablet: 3 tab(s) orally every 8 hours, As needed, Mild Pain (1 - 3)  albuterol 90 mcg/inh inhalation aerosol: 2 puff(s) inhaled every 4 hours, As needed, Shortness of Breath  amiodarone 200 mg oral tablet: 1 tab(s) orally once a day  apixaban 5 mg oral tablet: 1 tab(s) orally every 12 hours  cholestyramine 4 g/5 g oral powder for reconstitution: 1 packet(s) orally 2 times a day  ezetimibe 10 mg oral tablet: 1 tab(s) orally once a day (in the evening)  furosemide 40 mg oral tablet: 1 tab(s) orally once a day  gabapentin 400 mg oral capsule: 2 cap(s) orally 3 times a day  metFORMIN 1000 mg oral tablet: 1 tab(s) orally 2 times a day  metoprolol succinate 100 mg oral tablet, extended release: 1 tab(s) orally once a day  montelukast 10 mg oral tablet: 1 tab(s) orally once a day (at bedtime)  pravastatin 10 mg oral tablet: 1 tab(s) orally once a day (in the evening)  spironolactone 25 mg oral tablet: 1 tab(s) orally once a day  zolpidem 10 mg oral tablet: 1 tab(s) orally once a day (at bedtime)

## 2022-12-23 NOTE — DISCHARGE NOTE PROVIDER - PROVIDER TOKENS
PROVIDER:[TOKEN:[[id number]]],PROVIDER:[TOKEN:[[id number]]],PROVIDER:[TOKEN:[[id number]]],PROVIDER:[TOKEN:[[id number]]]

## 2022-12-23 NOTE — PROGRESS NOTE ADULT - TIME BILLING
Time spent at bedside interviewing and examining the patient, reviewing the chart and telemetry, and coordinating care with the primary team.  I counselled patient on the heart failure disease process and the ongoing medical therapy.

## 2022-12-23 NOTE — PROGRESS NOTE ADULT - SUBJECTIVE AND OBJECTIVE BOX
[facility] Advanced Heart Failure  Office: [address] [place] [address]  Service Phone [phone]  Office Phone: [phone]/Fax: [phone]    Subjective/Objective: NAEO. Remains in SR. S/P inconclusive dobutamine stress echo this am.     Medications:  acetaminophen     Tablet .. 975 milliGRAM(s) Oral every 8 hours PRN  albuterol/ipratropium for Nebulization 3 milliLiter(s) Nebulizer every 6 hours PRN  aMIOdarone    Tablet   Oral   aMIOdarone    Tablet 200 milliGRAM(s) Oral daily  atorvastatin 80 milliGRAM(s) Oral at bedtime  dextrose 50% Injectable 25 Gram(s) IV Push once  dextrose 50% Injectable 12.5 Gram(s) IV Push once  dextrose 50% Injectable 25 Gram(s) IV Push once  dextrose Oral Gel 15 Gram(s) Oral once  ezetimibe 10 milliGRAM(s) Oral daily  furosemide    Tablet 40 milliGRAM(s) Oral daily  gabapentin 800 milliGRAM(s) Oral three times a day  glucagon  Injectable 1 milliGRAM(s) IntraMuscular once  heparin  Infusion.  Unit(s)/Hr IV Continuous <Continuous>  insulin lispro (ADMELOG) corrective regimen sliding scale   SubCutaneous Before meals and at bedtime  insulin lispro Injectable (ADMELOG) 2 Unit(s) SubCutaneous three times a day before meals  meropenem Injectable 1000 milliGRAM(s) IV Push every 8 hours  metoprolol succinate  milliGRAM(s) Oral daily  montelukast 10 milliGRAM(s) Oral at bedtime  saccharomyces boulardii 250 milliGRAM(s) Oral two times a day  sodium chloride 0.9% lock flush 3 milliLiter(s) IV Push every 8 hours  spironolactone 25 milliGRAM(s) Oral daily  zolpidem 5 milliGRAM(s) Oral at bedtime PRN    Vital Signs Last 24 Hours  T(C): 36.4 (22 @ 10:00), Max: 36.8 (22 @ 15:26)  HR: 63 (22 @ 10:00) (56 - 82)  BP: 121/79 (22 @ 10:00) (101/63 - 128/56)  RR: 17 (22 @ 10:00) (17 - 18)  SpO2: 93% (22 @ 10:00) (92% - 100%)    Weight in k ( @ 04:04)    I&O's Summary  22 Dec 2022 07:01  -  23 Dec 2022 07:00  --------------------------------------------------------  IN: 690 mL / OUT: 2400 mL / NET: -1710 mL    23 Dec 2022 07:01  -  23 Dec 2022 12:54  --------------------------------------------------------  IN: 0 mL / OUT: 1000 mL / NET: -1000 mL     Tele: Sinus rhythm    Physical Exam:  General: No distress. Comfortable.  HEENT: EOM intact.  Neck: Neck supple. JVP ~7-8cm H20.  Chest: Diminished at bases.  CV: Irregularly irregular. Normal S1 and S2. II/VI systolic murmur LSB.  PV: 1+ LLE pedal edema. Venous stasis discoloration noted bilaterally.  Abdomen: Soft, non-distended  Skin: generalized ecchymosis  Neurology: Alert and oriented times three. Sensation intact  Psych: Affect normal    Labs:                        10.6   6.64  )-----------( 306      ( 23 Dec 2022 04:25 )             34.4         138  |  102  |  16.9  ----------------------------<  139<H>  3.6   |  28.0  |  0.56    Ca    8.5      23 Dec 2022 04:25  Mg     1.8           PTT - ( 23 Dec 2022 04:25 )  PTT:81.7 sec

## 2022-12-23 NOTE — DISCHARGE NOTE PROVIDER - NSDCCPCAREPLAN_GEN_ALL_CORE_FT
PRINCIPAL DISCHARGE DIAGNOSIS  Diagnosis: Aortic stenosis  Assessment and Plan of Treatment: You have an appointment for a repeat dobutamine stress echo 22 at 1PM.  The test will be done here at [facility].  The hospital will be in contact with you prior to your appointment with specific information regarding when to come in and where to go.   PLEASE TAKE YOUR LAST DOSE OF TOPROL ON  and then please stop taking it until after the dobutamine stress echo.  Please take your medications as prescribed.   Please report to the nearest emergency room if you develop any new or worsening symptoms.   Please follow up with Dr. Hopper in the office after the dobutamine stress test is done.  Please call for an appointment.         SECONDARY DISCHARGE DIAGNOSES  Diagnosis: Atrial fibrillation  Assessment and Plan of Treatment: You underwent cardioversion while in the hospital.  Please stop taking your coumadin.  Please take the Eliquis as prescribed. The pharmacy is givng you a one month free coupon for it.  Please follow up with the electrophysiologist in 3 weeks (Dr. Estes).  Please make an appointment.  The electrophysiologist will then give you further instructions/prescription for Eliquis.      Diagnosis: UTI due to extended-spectrum beta lactamase (ESBL) producing Escherichia coli  Assessment and Plan of Treatment: You were treated for a UTI while in the hospital.    Diagnosis: Diabetes mellitus type II, controlled  Assessment and Plan of Treatment: Please stop your farxiga.  Please resume your metformin.  Please follow up with your endocrinologist or PMD.  A registered dietician can help you create a personalized plan for healthy eating. Make your calories count by choosin. Healthy carbohydrates such as fruits, vegetables, whole grains, legumes (beans, peas and lentils) and low-fat dairy products.  2. Fiber-rich foods such as vegetables, fruits, nuts, legumes, whole-wheat flour and wheat bran.  3. Heart-healthy fish at least twice a week such as cod, tuna and halibut, which are low-fat options as well as salmon, mackerel, tuna, sardines and bluefish, which are rich in omega-3 fatty acids. Avoid fish with high levels of mercury.  4. "Good" fats such as avocados, almonds, pecans, walnuts, olives and canola, olive and peanut oils.   Minimize foods with high saturated fats (beef, hot dogs, sausage and mary), trans fats (processed snacks, baked goods, shortening and stick margarines), high cholesterol foods (high fat dairy products and animal proteins, fried food) and high sodium foods (fast food, many frozen foods, processed food).       Diagnosis: Aortic stenosis  Assessment and Plan of Treatment:     Diagnosis: Acute on chronic diastolic congestive heart failure  Assessment and Plan of Treatment: You are being sent home on oxygen.  Please use it as prescribed.   If you develop any new or worsening symptoms, please report to the nearest emergency room.   Please weigh yourself daily and write your weights down.  Bring them to your doctors appointments.   If you notice a 2lb or greater weight gain in any 24 hour period, please call your cardiologist.   You will need to follow up with the heart failure specialist .  Their office will call you next week regarding an appointment date and time.

## 2022-12-24 ENCOUNTER — TRANSCRIPTION ENCOUNTER (OUTPATIENT)
Age: 72
End: 2022-12-24

## 2022-12-29 ENCOUNTER — OUTPATIENT (OUTPATIENT)
Dept: OUTPATIENT SERVICES | Facility: HOSPITAL | Age: 72
LOS: 1 days | End: 2022-12-29

## 2022-12-29 DIAGNOSIS — Z98.890 OTHER SPECIFIED POSTPROCEDURAL STATES: Chronic | ICD-10-CM

## 2022-12-29 DIAGNOSIS — K95.09 OTHER COMPLICATIONS OF GASTRIC BAND PROCEDURE: Chronic | ICD-10-CM

## 2022-12-29 DIAGNOSIS — I35.0 NONRHEUMATIC AORTIC (VALVE) STENOSIS: ICD-10-CM

## 2022-12-29 DIAGNOSIS — Z82.49 FAMILY HISTORY OF ISCHEMIC HEART DISEASE AND OTHER DISEASES OF THE CIRCULATORY SYSTEM: ICD-10-CM

## 2022-12-30 ENCOUNTER — TRANSCRIPTION ENCOUNTER (OUTPATIENT)
Age: 72
End: 2022-12-30

## 2023-01-03 ENCOUNTER — APPOINTMENT (OUTPATIENT)
Dept: CARDIOTHORACIC SURGERY | Facility: CLINIC | Age: 73
End: 2023-01-03
Payer: MEDICARE

## 2023-01-03 VITALS
RESPIRATION RATE: 16 BRPM | SYSTOLIC BLOOD PRESSURE: 138 MMHG | BODY MASS INDEX: 50.98 KG/M2 | OXYGEN SATURATION: 97 % | WEIGHT: 270 LBS | HEIGHT: 61 IN | HEART RATE: 55 BPM | DIASTOLIC BLOOD PRESSURE: 68 MMHG

## 2023-01-03 PROCEDURE — 99213 OFFICE O/P EST LOW 20 MIN: CPT

## 2023-01-03 RX ORDER — PREDNISONE 10 MG/1
10 TABLET ORAL DAILY
Qty: 30 | Refills: 0 | Status: COMPLETED | COMMUNITY
Start: 2022-03-11 | End: 2023-01-03

## 2023-01-03 RX ORDER — WARFARIN 5 MG/1
5 TABLET ORAL DAILY
Refills: 0 | Status: COMPLETED | COMMUNITY
Start: 2022-06-15 | End: 2023-01-03

## 2023-01-03 RX ORDER — DICLOFENAC SODIUM 10 MG/G
1 GEL TOPICAL DAILY
Qty: 1 | Refills: 2 | Status: COMPLETED | COMMUNITY
Start: 2020-03-04 | End: 2023-01-03

## 2023-01-03 RX ORDER — CIPROFLOXACIN HYDROCHLORIDE 500 MG/1
500 TABLET, FILM COATED ORAL
Qty: 14 | Refills: 0 | Status: COMPLETED | COMMUNITY
Start: 2022-08-05 | End: 2023-01-03

## 2023-01-03 RX ORDER — DICLOFENAC SODIUM 75 MG/1
75 TABLET, DELAYED RELEASE ORAL
Qty: 60 | Refills: 2 | Status: COMPLETED | COMMUNITY
Start: 2022-07-08 | End: 2023-01-03

## 2023-01-03 RX ORDER — CLOBETASOL PROPIONATE 0.5 MG/G
0.05 CREAM TOPICAL TWICE DAILY
Qty: 3 | Refills: 2 | Status: COMPLETED | COMMUNITY
Start: 2022-03-11 | End: 2023-01-03

## 2023-01-03 RX ORDER — VERAPAMIL HYDROCHLORIDE 120 MG/1
120 CAPSULE, EXTENDED RELEASE ORAL
Refills: 0 | Status: COMPLETED | COMMUNITY
Start: 2022-06-15 | End: 2023-01-03

## 2023-01-03 RX ORDER — CIPROFLOXACIN HYDROCHLORIDE 500 MG/1
500 TABLET, FILM COATED ORAL
Qty: 14 | Refills: 0 | Status: COMPLETED | COMMUNITY
Start: 2022-07-08 | End: 2023-01-03

## 2023-01-03 RX ORDER — CLOBETASOL PROPIONATE 0.5 MG/G
0.05 CREAM TOPICAL TWICE DAILY
Qty: 1 | Refills: 3 | Status: COMPLETED | COMMUNITY
Start: 2020-02-14 | End: 2023-01-03

## 2023-01-03 RX ORDER — LEVOFLOXACIN 500 MG/1
500 TABLET, FILM COATED ORAL DAILY
Qty: 14 | Refills: 0 | Status: COMPLETED | COMMUNITY
Start: 2022-10-07 | End: 2023-01-03

## 2023-01-03 NOTE — H&P ADULT - ASSESSMENT
Surgery date per MD/PA  Insurance verified. Specialty Referral Req'd?:  N/A  Scheduled right LEO surgery and entered [id number] order via Case Request OR Order, case #[id number]  DOS: 3/13/23  Procedure Time: 1200  Arrival Time: 1000 @ [facility]  Patient does use [employer], notified of surgery information and instructions electronically.  Patient to schedule preop H&P and labs/chest x-ray/EKG with PCP 2-3 wks prior to surgery, less than 30 days prior.  MD / KAYLAH, preop covid test and postop appts scheduled.  Staff message sent to PT @ [facility] to schedule prehab/postop appts.  Updated [facility] Calendar.   71 y/o female pmhx Afib on coumadin, HFpEF, DM2, peripheral neuropathy, PVD, HTN, HLD, obesity presented to ER tonight w/ fevers and right lower extremity pain.    Assessment:  1. Septic Shock  2. B/L Cellulitis  3. Afib w/ RVR  4. Lactic acidosis  5. RAYMOND   6. Hypomagnesemia     Plan   Neuro: Mentation intact. Pain control w/ tylenol PRN   CV: Shock, actively titrating phenylephrine to maintain MAP >65. Lactic acidosis, repeat lactate .  - Afib w/ RVR, s/p IVF hydration. Hold BB w/ shock. Keep K >4 and Mg >2.   - HTN, holding anti hypertensives in setting of shock  Resp: Supplemental O2 to maintain O2 sat >92%.   GI: NPO  Renal: RAYMOND, likely ATN from shock state. Crt doubled since last admission. s/p IVFs. Trend BUN/Crt.  Gentle IVF hydration. Trend lactate until cleared  - Hypomagnesemia supplemented w/ 4G Mgsulfate. Repeat in am  ID: Broad spectrum abx w/ cefepime and Vanc. Blood cultures pending. Check UA and urine culture.   Heme: On Coumadin at home, check STAT coags. Dose coumadin daily . INR in am.     Discussed w/ eICU

## 2023-01-04 NOTE — REVIEW OF SYSTEMS
[Feeling Poorly] : feeling poorly [Feeling Tired] : feeling tired [Lower Ext Edema] : lower extremity edema [Shortness Of Breath] : shortness of breath [SOB on Exertion] : shortness of breath during exertion [Arthralgias] : arthralgias [Joint Stiffness] : joint stiffness [Negative] : Heme/Lymph [Chest Pain] : no chest pain [Palpitations] : no palpitations [Cough] : no cough [FreeTextEntry9] : ambulates with walker and cane

## 2023-01-04 NOTE — HISTORY OF PRESENT ILLNESS
[FreeTextEntry1] : Ms. ANGELINA RAMIREZ is a 72 year female who presents today for followup. Previously, she has been followed for severe aortic stenosis. She was admitted to [facility] for progressive dyspnea on exertion and lower extremity edema with palpitations.\par \par While inpatient, a transthoracic echocardiogram revealed moderate aortic stenosis with an aortic valve area of 1.1 with low gradients. Left and right heart catheterizations was negative for coronary disease. Transesophageal echocardiogram showed severe aortic stenosis by planimetry, dimensionless index of 0.26 and mean gradient of 10 mmHg which was thought to be more consistent with moderate aortic stenosis. Cardioversion performed for atrial fibrillation and normal sinus rhythm was restored. Dobutamine stress test also completed and patient presents today for followup and transcatheter aortic valve replacement evaluation and discussion.\par \par Additional past medical history includes aortic stenosis, hypertension, hyperlipidemia, type 2 diabetes mellitus, cellulitis, bacteremia, urinary tract infection, prior lap band (removed). lower extremity venous stasis, vascular ulcers\par \par Today, the patient reports feeling short of breath with minimal exertion. She has no chest pain, palpitations, or syncope. \par \par \par \par \par Primary cardiologist: Justo Mayer

## 2023-01-04 NOTE — PHYSICAL EXAM
[Sclera] : the sclera and conjunctiva were normal [Neck Appearance] : the appearance of the neck was normal [Respiration, Rhythm And Depth] : normal respiratory rhythm and effort [Auscultation Breath Sounds / Voice Sounds] : lungs were clear to auscultation bilaterally [Heart Rate And Rhythm] : heart rate was normal and rhythm regular [FreeTextEntry1] : NYHAC II   Grade 2/6 systolic murmur [Examination Of The Chest] : the chest was normal in appearance [Abnormal Walk] : normal gait [Skin Color & Pigmentation] : normal skin color and pigmentation [Sensation] : the sensory exam was normal to light touch and pinprick [Motor Exam] : the motor exam was normal [Oriented To Time, Place, And Person] : oriented to person, place, and time [Impaired Insight] : insight and judgment were intact

## 2023-01-04 NOTE — ASSESSMENT
[FreeTextEntry1] : Ms Kirby returns to care today to discuss recent imaging revealing moderate aortic stenosis. She does report pulmonary issues and vascular issues and would not be a good candidate for OHS. She states that she has felt a bit of improvement since her cardioversion and return to RSR.\par \par Independent review of the imaging and stress testing was also performed today and the aortic valve stenosis does appear to be moderate. Given her symptoms and imaging results the only option is to enroll in Expand II trial for moderate aortic stenosis. We discussed that with this trial she has a 50% chance of valve replacement and a 50% chance of medical management if accepted into the study. She was informed that she may withdraw from the study at any time she chooses. Informed consent was obtained and a copy was provided to her. \par \par PLAN:\par - Consent obtained for Expand II Trial\par - Will proceed with trial\par \par \par \par \par \par \par \par \par I, Dr. Hopper, personally performed the evaluation and management (E/M) services for this established patient who presents today with an existing condition(s).  That E/M includes conducting the examination, assessing all conditions, and establishing a new plan of care.  Today, my ACP, Grant Hubbard NP was here to observe my evaluation and management services for this new problem/exacerbated condition to be followed going forward.\par \par \par

## 2023-01-04 NOTE — CONSULT LETTER
[Dear  ___] : Dear  [unfilled], [Courtesy Letter:] : I had the pleasure of seeing your patient, [unfilled], in my office today. [Please see my note below.] : Please see my note below. [Consult Closing:] : Thank you very much for allowing me to participate in the care of this patient.  If you have any questions, please do not hesitate to contact me. [Sincerely,] : Sincerely, [FreeTextEntry2] : Justo Mayer MD [FreeTextEntry3] : Venkata Hopper MD\par , Cardiothoracic Surgery\par , Cardiovascular and Thoracic Surgery\par [facility], [facility]\par [facility]\par [facility]\par [address] [place] [address]\[place] [address]\par Tel. [phone]\par Fax [phone]

## 2023-01-05 ENCOUNTER — TRANSCRIPTION ENCOUNTER (OUTPATIENT)
Age: 73
End: 2023-01-05

## 2023-01-06 ENCOUNTER — NON-APPOINTMENT (OUTPATIENT)
Age: 73
End: 2023-01-06

## 2023-01-06 ENCOUNTER — APPOINTMENT (OUTPATIENT)
Dept: CARDIOLOGY | Facility: CLINIC | Age: 73
End: 2023-01-06
Payer: MEDICARE

## 2023-01-06 VITALS
WEIGHT: 270 LBS | HEIGHT: 61 IN | OXYGEN SATURATION: 97 % | BODY MASS INDEX: 50.98 KG/M2 | HEART RATE: 57 BPM | DIASTOLIC BLOOD PRESSURE: 84 MMHG | SYSTOLIC BLOOD PRESSURE: 137 MMHG

## 2023-01-06 DIAGNOSIS — Z82.49 FAMILY HISTORY OF ISCHEMIC HEART DISEASE AND OTHER DISEASES OF THE CIRCULATORY SYSTEM: ICD-10-CM

## 2023-01-06 PROCEDURE — 93010 ELECTROCARDIOGRAM REPORT: CPT

## 2023-01-06 PROCEDURE — 99213 OFFICE O/P EST LOW 20 MIN: CPT

## 2023-01-06 PROCEDURE — 99203 OFFICE O/P NEW LOW 30 MIN: CPT

## 2023-01-06 RX ORDER — FLUTICASONE PROPIONATE AND SALMETEROL 250; 50 UG/1; UG/1
250-50 POWDER RESPIRATORY (INHALATION)
Qty: 1 | Refills: 1 | Status: DISCONTINUED | COMMUNITY
Start: 2022-06-16 | End: 2023-01-06

## 2023-01-06 RX ORDER — SILVER SULFADIAZINE 10 MG/G
1 CREAM TOPICAL DAILY
Qty: 1 | Refills: 1 | Status: DISCONTINUED | COMMUNITY
Start: 2022-08-05 | End: 2023-01-06

## 2023-01-09 RX ORDER — CHOLESTYRAMINE 4 G/9G
4 POWDER, FOR SUSPENSION ORAL
Refills: 0 | Status: ACTIVE | COMMUNITY

## 2023-01-10 PROBLEM — Z82.49 FAMILY HISTORY OF CORONARY ARTERY DISEASE: Status: ACTIVE | Noted: 2023-01-10

## 2023-01-10 RX ORDER — SACUBITRIL AND VALSARTAN 24; 26 MG/1; MG/1
24-26 TABLET, FILM COATED ORAL TWICE DAILY
Qty: 30 | Refills: 2 | Status: DISCONTINUED | COMMUNITY
Start: 2023-01-09 | End: 2023-01-10

## 2023-01-10 NOTE — ASSESSMENT
[FreeTextEntry1] : 71 y/o female, former smoker, with HFpEF (LVEF 50-55%), aortic stenosis, atrial fibrillation, HTN, HLD, DM (controlled on Metformin), chronic PVD/prior vascular ulcers (followed by Dr. Yoo), morbid obesity/prior lap band (since removed). Who was recently admitted to  and later to [facility] for further management of her AS and heart failure. She is exhibiting NYHA class II symptoms, appears mildly volume overloaded on exam and is hypertensive.  I have made the following recommendations:   \par \par \par 1. Problem: HFpEF \par - WIll start losartan 25 mg po daily (entresto currently cost prohibitive, arranging patient assistance plan)\par - Continue Toprol-XL 100mg daily and Spironolactone 25mg daily.\par - No longer on Jardiance in setting of recurrent UTI. \par - Diuretics: Lasix 40mg daily.   \par - She may be a candidate for a CardioMEMS device \par \par 2. Aortic Stenosis\par  -Only moderate on TTE (ADRIAN 1.1, PPG 36.5, DI 0.36). No significant gradient LHC. ASHLEY showed ADRIAN 0.8, DI 0.26 MG not.\par - Dobutamine stress echo was suboptimal based on HR response however pt did elicit MG 33, Vmax 3.9cm. Will follow with structural heart\par \par 3. Atrial Fibrillation \par  Problem/Plan - 3:\par - S/p DCCV. \par - Continue amiodarone.\par - On Eliquis 5mg BID.\par \par

## 2023-01-10 NOTE — PHYSICAL EXAM
[Well Developed] : well developed [Well Nourished] : well nourished [No Acute Distress] : no acute distress [Normal Conjunctiva] : normal conjunctiva [No Carotid Bruit] : no carotid bruit [Elevated JVD ____cm] : elevated JVD ~Vcm [Normal S1, S2] : normal S1, S2 [No Murmur] : no murmur [No Rub] : no rub [Clear Lung Fields] : clear lung fields [Good Air Entry] : good air entry [No Respiratory Distress] : no respiratory distress  [Soft] : abdomen soft [Non Tender] : non-tender [Normal Gait] : normal gait [Moves all extremities] : moves all extremities [No Focal Deficits] : no focal deficits [Alert and Oriented] : alert and oriented [de-identified] : 1+ edema  [de-identified] : EDUARD Bullock

## 2023-01-10 NOTE — CARDIOLOGY SUMMARY
----- Message from Carl Jordan MD sent at 2/12/2018  6:48 AM CST -----  Inform mom that all labs are NORMAL - everything looks good  cxr normal as well - see other note   [de-identified] : 1/6/23:  Sinus Bradycardia 57 BPM [de-identified] : DSE 12/23/22: Nondiagnostic study given incongruent Doppler profiles  (between gradient and VTI) at peak dose of dobutamine, and only reached  69% MPHR. Consider alternative imaging modality such as CT aortic valve calcium score to confirm severity.\par  [de-identified] : TTE 12/14/22: LVEF 50-55%, LVIDd 5.11cm, IVSd 1.2cm, mild LAE, severe AGUILA, moderate RVE with low/normal RV function, mild AI, moderate AS (MG 23, PV 3.02m/s), mild MR, moderate TR, PASP 41.4 mmHg (RAP 3 mmHg).\par \par  [de-identified] : RHC: full report pending, RA 24, RV 62/22 PA 63/32 (46), PCWP 18, CO 6.4/ CI 2.98, PA sat 53%.PVR 2.34 \par LHC: RCA 80%,

## 2023-01-10 NOTE — HISTORY OF PRESENT ILLNESS
[FreeTextEntry1] : 73 y/o female, former smoker, with HFpEF (LVEF 50-55%), aortic stenosis, atrial fibrillation, HTN, HLD, DM (controlled on Metformin), chronic PVD/prior vascular ulcers (followed by Dr. Yoo), morbid obesity/prior lap band (since removed). Who was recently admitted to  and later to [facility] for further management of her AS and heart failure.  She now presents for initial heart failure visit.  \par \par The patient has noticed worsening exertional dyspnea and fatigue since the early fall of 2022.  She was admitted to  in October of 2022 for cellulitis, requiring IV antibiotic treatment.  She was again admitted on 11/29/22 to  for exacerbation of her HF and discharged on 12/9 but was seen by her cardiologist on 12/13 and found to have continued dyspnea and LE edema prompting her to be sent to [facility] for further evaluation of her AS related HF.  \par Her edema improved with diuresis, but continued to report significant dyspnea.  She underwent a successful cardioversion on 12/22.  Her AS was reviewed and found to be moderate confirmed by dobutamine stress echo.  She was discharged to follow up with Structural heart team, where she was offered enrollment in Expand II trial for moderate AS, but she declined.\par \par Since her discharge, she has felt less dyspnea and fatigue, but still reprots that her activity is limited.  She has been unable to walk as much as she had last summer.  She continues to notice swelling to her legs.  Her home weight has ranged between 260 and 262 lbs, with home -130s/60.  She denies any syncope, LH/dizziness, chest pain, palpitations, PND, orthopnea, nausea/vomiting, abdominal pain. \par \par

## 2023-01-11 ENCOUNTER — RX RENEWAL (OUTPATIENT)
Age: 73
End: 2023-01-11

## 2023-01-18 ENCOUNTER — NON-APPOINTMENT (OUTPATIENT)
Age: 73
End: 2023-01-18

## 2023-01-18 ENCOUNTER — APPOINTMENT (OUTPATIENT)
Dept: ELECTROPHYSIOLOGY | Facility: CLINIC | Age: 73
End: 2023-01-18
Payer: MEDICARE

## 2023-01-18 VITALS
BODY MASS INDEX: 50.6 KG/M2 | OXYGEN SATURATION: 93 % | SYSTOLIC BLOOD PRESSURE: 138 MMHG | HEART RATE: 62 BPM | WEIGHT: 268 LBS | HEIGHT: 61 IN | DIASTOLIC BLOOD PRESSURE: 72 MMHG | TEMPERATURE: 98 F

## 2023-01-18 DIAGNOSIS — I10 ESSENTIAL (PRIMARY) HYPERTENSION: ICD-10-CM

## 2023-01-18 PROCEDURE — 93000 ELECTROCARDIOGRAM COMPLETE: CPT

## 2023-01-18 PROCEDURE — 99215 OFFICE O/P EST HI 40 MIN: CPT

## 2023-01-18 RX ORDER — EZETIMIBE 10 MG/1
10 TABLET ORAL
Refills: 0 | Status: DISCONTINUED | COMMUNITY
Start: 2022-06-15 | End: 2023-01-18

## 2023-01-18 RX ORDER — ZOLPIDEM TARTRATE 10 MG/1
10 TABLET ORAL
Refills: 0 | Status: DISCONTINUED | COMMUNITY
Start: 2017-09-01 | End: 2023-01-18

## 2023-02-02 ENCOUNTER — EMERGENCY (EMERGENCY)
Facility: HOSPITAL | Age: 73
LOS: 0 days | Discharge: HOME | End: 2023-02-02
Attending: EMERGENCY MEDICINE
Payer: MEDICARE

## 2023-02-02 VITALS
TEMPERATURE: 98 F | HEART RATE: 82 BPM | OXYGEN SATURATION: 96 % | RESPIRATION RATE: 18 BRPM | DIASTOLIC BLOOD PRESSURE: 64 MMHG | SYSTOLIC BLOOD PRESSURE: 138 MMHG

## 2023-02-02 VITALS — HEIGHT: 70 IN

## 2023-02-02 DIAGNOSIS — I50.9 HEART FAILURE, UNSPECIFIED: ICD-10-CM

## 2023-02-02 DIAGNOSIS — Z98.890 OTHER SPECIFIED POSTPROCEDURAL STATES: Chronic | ICD-10-CM

## 2023-02-02 DIAGNOSIS — I11.0 HYPERTENSIVE HEART DISEASE WITH HEART FAILURE: ICD-10-CM

## 2023-02-02 DIAGNOSIS — R11.0 NAUSEA: ICD-10-CM

## 2023-02-02 DIAGNOSIS — E04.1 NONTOXIC SINGLE THYROID NODULE: ICD-10-CM

## 2023-02-02 DIAGNOSIS — R10.31 RIGHT LOWER QUADRANT PAIN: ICD-10-CM

## 2023-02-02 DIAGNOSIS — Z79.84 LONG TERM (CURRENT) USE OF ORAL HYPOGLYCEMIC DRUGS: ICD-10-CM

## 2023-02-02 DIAGNOSIS — Z90.49 ACQUIRED ABSENCE OF OTHER SPECIFIED PARTS OF DIGESTIVE TRACT: ICD-10-CM

## 2023-02-02 DIAGNOSIS — Z88.8 ALLERGY STATUS TO OTHER DRUGS, MEDICAMENTS AND BIOLOGICAL SUBSTANCES: ICD-10-CM

## 2023-02-02 DIAGNOSIS — Z91.040 LATEX ALLERGY STATUS: ICD-10-CM

## 2023-02-02 DIAGNOSIS — Z88.0 ALLERGY STATUS TO PENICILLIN: ICD-10-CM

## 2023-02-02 DIAGNOSIS — Z20.822 CONTACT WITH AND (SUSPECTED) EXPOSURE TO COVID-19: ICD-10-CM

## 2023-02-02 DIAGNOSIS — E11.40 TYPE 2 DIABETES MELLITUS WITH DIABETIC NEUROPATHY, UNSPECIFIED: ICD-10-CM

## 2023-02-02 DIAGNOSIS — I48.91 UNSPECIFIED ATRIAL FIBRILLATION: ICD-10-CM

## 2023-02-02 DIAGNOSIS — I87.2 VENOUS INSUFFICIENCY (CHRONIC) (PERIPHERAL): ICD-10-CM

## 2023-02-02 DIAGNOSIS — K95.09 OTHER COMPLICATIONS OF GASTRIC BAND PROCEDURE: Chronic | ICD-10-CM

## 2023-02-02 DIAGNOSIS — Z86.39 PERSONAL HISTORY OF OTHER ENDOCRINE, NUTRITIONAL AND METABOLIC DISEASE: ICD-10-CM

## 2023-02-02 DIAGNOSIS — R19.7 DIARRHEA, UNSPECIFIED: ICD-10-CM

## 2023-02-02 LAB
ALBUMIN SERPL ELPH-MCNC: 3.3 G/DL — SIGNIFICANT CHANGE UP (ref 3.3–5)
ALP SERPL-CCNC: 53 U/L — SIGNIFICANT CHANGE UP (ref 40–120)
ALT FLD-CCNC: 36 U/L — SIGNIFICANT CHANGE UP (ref 12–78)
ANION GAP SERPL CALC-SCNC: 6 MMOL/L — SIGNIFICANT CHANGE UP (ref 5–17)
APPEARANCE UR: CLEAR — SIGNIFICANT CHANGE UP
AST SERPL-CCNC: 20 U/L — SIGNIFICANT CHANGE UP (ref 15–37)
BASOPHILS # BLD AUTO: 0.05 K/UL — SIGNIFICANT CHANGE UP (ref 0–0.2)
BASOPHILS NFR BLD AUTO: 0.6 % — SIGNIFICANT CHANGE UP (ref 0–2)
BILIRUB SERPL-MCNC: 0.3 MG/DL — SIGNIFICANT CHANGE UP (ref 0.2–1.2)
BILIRUB UR-MCNC: NEGATIVE — SIGNIFICANT CHANGE UP
BUN SERPL-MCNC: 16 MG/DL — SIGNIFICANT CHANGE UP (ref 7–23)
CALCIUM SERPL-MCNC: 9.3 MG/DL — SIGNIFICANT CHANGE UP (ref 8.5–10.1)
CHLORIDE SERPL-SCNC: 108 MMOL/L — SIGNIFICANT CHANGE UP (ref 96–108)
CO2 SERPL-SCNC: 27 MMOL/L — SIGNIFICANT CHANGE UP (ref 22–31)
COLOR SPEC: YELLOW — SIGNIFICANT CHANGE UP
CREAT SERPL-MCNC: 0.69 MG/DL — SIGNIFICANT CHANGE UP (ref 0.5–1.3)
DIFF PNL FLD: NEGATIVE — SIGNIFICANT CHANGE UP
EGFR: 92 ML/MIN/1.73M2 — SIGNIFICANT CHANGE UP
EOSINOPHIL # BLD AUTO: 0.09 K/UL — SIGNIFICANT CHANGE UP (ref 0–0.5)
EOSINOPHIL NFR BLD AUTO: 1.1 % — SIGNIFICANT CHANGE UP (ref 0–6)
FLUAV AG NPH QL: SIGNIFICANT CHANGE UP
FLUBV AG NPH QL: SIGNIFICANT CHANGE UP
GLUCOSE SERPL-MCNC: 99 MG/DL — SIGNIFICANT CHANGE UP (ref 70–99)
GLUCOSE UR QL: NEGATIVE — SIGNIFICANT CHANGE UP
HCT VFR BLD CALC: 42.2 % — SIGNIFICANT CHANGE UP (ref 34.5–45)
HGB BLD-MCNC: 13.3 G/DL — SIGNIFICANT CHANGE UP (ref 11.5–15.5)
IMM GRANULOCYTES NFR BLD AUTO: 0.2 % — SIGNIFICANT CHANGE UP (ref 0–0.9)
KETONES UR-MCNC: NEGATIVE — SIGNIFICANT CHANGE UP
LEUKOCYTE ESTERASE UR-ACNC: NEGATIVE — SIGNIFICANT CHANGE UP
LIDOCAIN IGE QN: 96 U/L — SIGNIFICANT CHANGE UP (ref 73–393)
LYMPHOCYTES # BLD AUTO: 1.24 K/UL — SIGNIFICANT CHANGE UP (ref 1–3.3)
LYMPHOCYTES # BLD AUTO: 14.7 % — SIGNIFICANT CHANGE UP (ref 13–44)
MCHC RBC-ENTMCNC: 30.8 PG — SIGNIFICANT CHANGE UP (ref 27–34)
MCHC RBC-ENTMCNC: 31.5 GM/DL — LOW (ref 32–36)
MCV RBC AUTO: 97.7 FL — SIGNIFICANT CHANGE UP (ref 80–100)
MONOCYTES # BLD AUTO: 0.48 K/UL — SIGNIFICANT CHANGE UP (ref 0–0.9)
MONOCYTES NFR BLD AUTO: 5.7 % — SIGNIFICANT CHANGE UP (ref 2–14)
NEUTROPHILS # BLD AUTO: 6.57 K/UL — SIGNIFICANT CHANGE UP (ref 1.8–7.4)
NEUTROPHILS NFR BLD AUTO: 77.7 % — HIGH (ref 43–77)
NITRITE UR-MCNC: NEGATIVE — SIGNIFICANT CHANGE UP
PH UR: 5 — SIGNIFICANT CHANGE UP (ref 5–8)
PLATELET # BLD AUTO: 284 K/UL — SIGNIFICANT CHANGE UP (ref 150–400)
POTASSIUM SERPL-MCNC: 4.4 MMOL/L — SIGNIFICANT CHANGE UP (ref 3.5–5.3)
POTASSIUM SERPL-SCNC: 4.4 MMOL/L — SIGNIFICANT CHANGE UP (ref 3.5–5.3)
PROT SERPL-MCNC: 7.7 GM/DL — SIGNIFICANT CHANGE UP (ref 6–8.3)
PROT UR-MCNC: NEGATIVE — SIGNIFICANT CHANGE UP
RBC # BLD: 4.32 M/UL — SIGNIFICANT CHANGE UP (ref 3.8–5.2)
RBC # FLD: 14.8 % — HIGH (ref 10.3–14.5)
RSV RNA NPH QL NAA+NON-PROBE: SIGNIFICANT CHANGE UP
SARS-COV-2 RNA SPEC QL NAA+PROBE: SIGNIFICANT CHANGE UP
SODIUM SERPL-SCNC: 141 MMOL/L — SIGNIFICANT CHANGE UP (ref 135–145)
SP GR SPEC: 1.01 — SIGNIFICANT CHANGE UP (ref 1.01–1.02)
UROBILINOGEN FLD QL: NEGATIVE — SIGNIFICANT CHANGE UP
WBC # BLD: 8.45 K/UL — SIGNIFICANT CHANGE UP (ref 3.8–10.5)
WBC # FLD AUTO: 8.45 K/UL — SIGNIFICANT CHANGE UP (ref 3.8–10.5)

## 2023-02-02 PROCEDURE — 74177 CT ABD & PELVIS W/CONTRAST: CPT | Mod: MA

## 2023-02-02 PROCEDURE — 87086 URINE CULTURE/COLONY COUNT: CPT

## 2023-02-02 PROCEDURE — 83690 ASSAY OF LIPASE: CPT

## 2023-02-02 PROCEDURE — 99285 EMERGENCY DEPT VISIT HI MDM: CPT | Mod: FS

## 2023-02-02 PROCEDURE — 85025 COMPLETE CBC W/AUTO DIFF WBC: CPT

## 2023-02-02 PROCEDURE — 74177 CT ABD & PELVIS W/CONTRAST: CPT | Mod: 26,MA

## 2023-02-02 PROCEDURE — 93010 ELECTROCARDIOGRAM REPORT: CPT

## 2023-02-02 PROCEDURE — 0241U: CPT

## 2023-02-02 PROCEDURE — 81003 URINALYSIS AUTO W/O SCOPE: CPT

## 2023-02-02 PROCEDURE — 99285 EMERGENCY DEPT VISIT HI MDM: CPT | Mod: 25

## 2023-02-02 PROCEDURE — 36415 COLL VENOUS BLD VENIPUNCTURE: CPT

## 2023-02-02 PROCEDURE — 87186 SC STD MICRODIL/AGAR DIL: CPT

## 2023-02-02 PROCEDURE — 80053 COMPREHEN METABOLIC PANEL: CPT

## 2023-02-02 PROCEDURE — 96375 TX/PRO/DX INJ NEW DRUG ADDON: CPT

## 2023-02-02 PROCEDURE — 93005 ELECTROCARDIOGRAM TRACING: CPT

## 2023-02-02 PROCEDURE — 96374 THER/PROPH/DIAG INJ IV PUSH: CPT

## 2023-02-02 RX ORDER — MORPHINE SULFATE 50 MG/1
4 CAPSULE, EXTENDED RELEASE ORAL ONCE
Refills: 0 | Status: DISCONTINUED | OUTPATIENT
Start: 2023-02-02 | End: 2023-02-02

## 2023-02-02 RX ORDER — ONDANSETRON 8 MG/1
4 TABLET, FILM COATED ORAL ONCE
Refills: 0 | Status: COMPLETED | OUTPATIENT
Start: 2023-02-02 | End: 2023-02-02

## 2023-02-02 RX ADMIN — MORPHINE SULFATE 4 MILLIGRAM(S): 50 CAPSULE, EXTENDED RELEASE ORAL at 18:53

## 2023-02-02 RX ADMIN — MORPHINE SULFATE 4 MILLIGRAM(S): 50 CAPSULE, EXTENDED RELEASE ORAL at 20:03

## 2023-02-02 RX ADMIN — ONDANSETRON 4 MILLIGRAM(S): 8 TABLET, FILM COATED ORAL at 18:51

## 2023-02-02 NOTE — ED PROVIDER NOTE - PROGRESS NOTE DETAILS
71 yo female with a PNH of a fib, dm, htn, chf, presents with RLQ pain since tuesday. Worse with layig down and with movement. +nausea, diarrhea. Had 3 episode of loose diarrhea yesterday and nothing today. Pt states she finished abx for a UTI approx 3 weeks ago and does not have any urinary complaints. Denies fever, cp, sob, hematuria, dysuria. +ttp to the RLQ,+rebound, +rovsing, Will check labs, UA, CT and reeval. -Miah Rush PA-C We are obtaining CT for pt to r/o appy. Asked pt if she has a llergy to iodine but pt states she received a CT recently with IV contrast and has not had a reaction to it. CT requesting to remove it from allergy list. -Miah Rush PA-C CT, labs, UA unremarkable. Discussed with pt. Possible muscular, gas or viral in nature since the workup was negative. Advised to continue to hydrate, tylenol for pain and to f/u with PMD and GI. .Pt aware and agrees with plan. -Miah Rush PA-C

## 2023-02-02 NOTE — ED PROVIDER NOTE - NS ED ROS FT
Constitutional: No fever or chills  Eyes: No visual changes  HEENT: No throat pain  CV: No chest pain  Resp: No SOB no cough  GI: No  vomiting, + RLQ abd pain, nausea, diarrhea  : No dysuria  MSK: No musculoskeletal pain  Skin: No rash  Neuro: No headache

## 2023-02-02 NOTE — ED PROVIDER NOTE - OBJECTIVE STATEMENT
71 yo F with PMHx of HTN, obesity, CHF, Afib, DM 2, s/p lap band but removed, s/p cholecystectomy SIB Dr. Sexton for RLQ abd pain x 3 days initially intermittent, now more constant with associating nausea and 3 episodes of  diarrhea yesterday. Pt reports recently on abx (Cirpo) for urine. Denies blood in diarrhea or diarrhea today Pt denies hx of appendectomy, kidney stone. Denies SOB, vomiting.

## 2023-02-02 NOTE — ED PROVIDER NOTE - PRO INTERPRETER NEED 2
English Anesthesia Type: 1% lidocaine with epinephrine and a 1:10 solution of 8.4% sodium bicarbonate

## 2023-02-02 NOTE — ED PROVIDER NOTE - PHYSICAL EXAMINATION
Constitutional: NAD AAOx3  Eyes: PERRL, EOMI  Head: Normocephalic atraumatic  Mouth: MMM  Cardiac: regular rate   Resp: Lungs CTAB  GI: + TTP RLQ.   Neuro: CN2-12 intact  Extremities: Intact distal pulses b/l, normal ROM b/l UE and LE, + venous stasis changes b/l lower extremities, but baseline per pt  Skin: No rashes Constitutional: NAD AAOx3  Eyes: PERRL, EOMI  Head: Normocephalic atraumatic  Mouth: MMM  Cardiac: regular rate   Resp: Lungs CTAB  GI: + TTP RLQ.   Neuro: CN2-12 intact  Extremities: Intact distal pulses b/l, + venous stasis changes b/l lower extremities, baseline per pt  Skin: No rashes

## 2023-02-02 NOTE — ED ADULT TRIAGE NOTE - CHIEF COMPLAINT QUOTE
patient presenting ambulatory to ED, sent in by dr. gonzalez, c/o severe right lower quadrant abdominal pain x3 days. + nausea, diarrhea. denies vomiting, chills, fever.

## 2023-02-02 NOTE — ED PROVIDER NOTE - PATIENT PORTAL LINK FT
You can access the FollowMyHealth Patient Portal offered by [facility] by registering at the following website: [email or web address]. By joining [employer]’s FollowMyHealth portal, you will also be able to view your health information using other applications (apps) compatible with our system.

## 2023-02-02 NOTE — ED PROVIDER NOTE - ATTENDING APP SHARED VISIT CONTRIBUTION OF CARE
I, Sharon Mario MD, personally saw the patient with ACP.  I have personally performed a face to face diagnostic evaluation on this patient.  I have reviewed the ACP note and agree with the history, exam, and plan of care, except as noted.  I personally saw the patient and performed a substantive portion of the visit including all aspects of the medical decision making.

## 2023-02-05 LAB
-  AMIKACIN: SIGNIFICANT CHANGE UP
-  AMOXICILLIN/CLAVULANIC ACID: SIGNIFICANT CHANGE UP
-  AMPICILLIN/SULBACTAM: SIGNIFICANT CHANGE UP
-  AMPICILLIN: SIGNIFICANT CHANGE UP
-  AZTREONAM: SIGNIFICANT CHANGE UP
-  CEFAZOLIN: SIGNIFICANT CHANGE UP
-  CEFEPIME: SIGNIFICANT CHANGE UP
-  CEFTRIAXONE: SIGNIFICANT CHANGE UP
-  CEFUROXIME: SIGNIFICANT CHANGE UP
-  CIPROFLOXACIN: SIGNIFICANT CHANGE UP
-  ERTAPENEM: SIGNIFICANT CHANGE UP
-  GENTAMICIN: SIGNIFICANT CHANGE UP
-  IMIPENEM: SIGNIFICANT CHANGE UP
-  LEVOFLOXACIN: SIGNIFICANT CHANGE UP
-  MEROPENEM: SIGNIFICANT CHANGE UP
-  NITROFURANTOIN: SIGNIFICANT CHANGE UP
-  PIPERACILLIN/TAZOBACTAM: SIGNIFICANT CHANGE UP
-  TOBRAMYCIN: SIGNIFICANT CHANGE UP
-  TRIMETHOPRIM/SULFAMETHOXAZOLE: SIGNIFICANT CHANGE UP
CULTURE RESULTS: SIGNIFICANT CHANGE UP
METHOD TYPE: SIGNIFICANT CHANGE UP
ORGANISM # SPEC MICROSCOPIC CNT: SIGNIFICANT CHANGE UP
ORGANISM # SPEC MICROSCOPIC CNT: SIGNIFICANT CHANGE UP
SPECIMEN SOURCE: SIGNIFICANT CHANGE UP

## 2023-02-06 ENCOUNTER — OUTPATIENT (OUTPATIENT)
Dept: OUTPATIENT SERVICES | Facility: HOSPITAL | Age: 73
LOS: 1 days | End: 2023-02-06
Payer: MEDICARE

## 2023-02-06 VITALS
RESPIRATION RATE: 19 BRPM | WEIGHT: 271.17 LBS | HEART RATE: 95 BPM | HEIGHT: 69 IN | SYSTOLIC BLOOD PRESSURE: 140 MMHG | TEMPERATURE: 97 F | OXYGEN SATURATION: 97 % | DIASTOLIC BLOOD PRESSURE: 90 MMHG

## 2023-02-06 DIAGNOSIS — Z29.9 ENCOUNTER FOR PROPHYLACTIC MEASURES, UNSPECIFIED: ICD-10-CM

## 2023-02-06 DIAGNOSIS — Z98.890 OTHER SPECIFIED POSTPROCEDURAL STATES: Chronic | ICD-10-CM

## 2023-02-06 DIAGNOSIS — Z13.89 ENCOUNTER FOR SCREENING FOR OTHER DISORDER: ICD-10-CM

## 2023-02-06 DIAGNOSIS — Z01.818 ENCOUNTER FOR OTHER PREPROCEDURAL EXAMINATION: ICD-10-CM

## 2023-02-06 DIAGNOSIS — Z98.49 CATARACT EXTRACTION STATUS, UNSPECIFIED EYE: Chronic | ICD-10-CM

## 2023-02-06 DIAGNOSIS — I48.0 PAROXYSMAL ATRIAL FIBRILLATION: ICD-10-CM

## 2023-02-06 DIAGNOSIS — E11.9 TYPE 2 DIABETES MELLITUS WITHOUT COMPLICATIONS: ICD-10-CM

## 2023-02-06 DIAGNOSIS — I10 ESSENTIAL (PRIMARY) HYPERTENSION: ICD-10-CM

## 2023-02-06 DIAGNOSIS — Z90.49 ACQUIRED ABSENCE OF OTHER SPECIFIED PARTS OF DIGESTIVE TRACT: Chronic | ICD-10-CM

## 2023-02-06 DIAGNOSIS — K95.09 OTHER COMPLICATIONS OF GASTRIC BAND PROCEDURE: Chronic | ICD-10-CM

## 2023-02-06 LAB
ANION GAP SERPL CALC-SCNC: 13 MMOL/L — SIGNIFICANT CHANGE UP (ref 5–17)
APTT BLD: 33.3 SEC — SIGNIFICANT CHANGE UP (ref 27.5–35.5)
BASOPHILS # BLD AUTO: 0.07 K/UL — SIGNIFICANT CHANGE UP (ref 0–0.2)
BASOPHILS NFR BLD AUTO: 0.8 % — SIGNIFICANT CHANGE UP (ref 0–2)
BLD GP AB SCN SERPL QL: SIGNIFICANT CHANGE UP
BUN SERPL-MCNC: 17.8 MG/DL — SIGNIFICANT CHANGE UP (ref 8–20)
CALCIUM SERPL-MCNC: 9 MG/DL — SIGNIFICANT CHANGE UP (ref 8.4–10.5)
CHLORIDE SERPL-SCNC: 107 MMOL/L — SIGNIFICANT CHANGE UP (ref 96–108)
CO2 SERPL-SCNC: 25 MMOL/L — SIGNIFICANT CHANGE UP (ref 22–29)
CREAT SERPL-MCNC: 0.63 MG/DL — SIGNIFICANT CHANGE UP (ref 0.5–1.3)
EGFR: 94 ML/MIN/1.73M2 — SIGNIFICANT CHANGE UP
EOSINOPHIL # BLD AUTO: 0.1 K/UL — SIGNIFICANT CHANGE UP (ref 0–0.5)
EOSINOPHIL NFR BLD AUTO: 1.1 % — SIGNIFICANT CHANGE UP (ref 0–6)
GLUCOSE SERPL-MCNC: 97 MG/DL — SIGNIFICANT CHANGE UP (ref 70–99)
HCT VFR BLD CALC: 40.1 % — SIGNIFICANT CHANGE UP (ref 34.5–45)
HGB BLD-MCNC: 12.8 G/DL — SIGNIFICANT CHANGE UP (ref 11.5–15.5)
IMM GRANULOCYTES NFR BLD AUTO: 0.3 % — SIGNIFICANT CHANGE UP (ref 0–0.9)
INR BLD: 1.32 RATIO — HIGH (ref 0.88–1.16)
LYMPHOCYTES # BLD AUTO: 1.03 K/UL — SIGNIFICANT CHANGE UP (ref 1–3.3)
LYMPHOCYTES # BLD AUTO: 11.7 % — LOW (ref 13–44)
MAGNESIUM SERPL-MCNC: 1.4 MG/DL — LOW (ref 1.8–2.6)
MCHC RBC-ENTMCNC: 30.2 PG — SIGNIFICANT CHANGE UP (ref 27–34)
MCHC RBC-ENTMCNC: 31.9 GM/DL — LOW (ref 32–36)
MCV RBC AUTO: 94.6 FL — SIGNIFICANT CHANGE UP (ref 80–100)
MONOCYTES # BLD AUTO: 0.61 K/UL — SIGNIFICANT CHANGE UP (ref 0–0.9)
MONOCYTES NFR BLD AUTO: 6.9 % — SIGNIFICANT CHANGE UP (ref 2–14)
NEUTROPHILS # BLD AUTO: 6.99 K/UL — SIGNIFICANT CHANGE UP (ref 1.8–7.4)
NEUTROPHILS NFR BLD AUTO: 79.2 % — HIGH (ref 43–77)
PLATELET # BLD AUTO: 295 K/UL — SIGNIFICANT CHANGE UP (ref 150–400)
POTASSIUM SERPL-MCNC: 4.3 MMOL/L — SIGNIFICANT CHANGE UP (ref 3.5–5.3)
POTASSIUM SERPL-SCNC: 4.3 MMOL/L — SIGNIFICANT CHANGE UP (ref 3.5–5.3)
PROTHROM AB SERPL-ACNC: 15.4 SEC — HIGH (ref 10.5–13.4)
RBC # BLD: 4.24 M/UL — SIGNIFICANT CHANGE UP (ref 3.8–5.2)
RBC # FLD: 14.6 % — HIGH (ref 10.3–14.5)
SODIUM SERPL-SCNC: 144 MMOL/L — SIGNIFICANT CHANGE UP (ref 135–145)
WBC # BLD: 8.83 K/UL — SIGNIFICANT CHANGE UP (ref 3.8–10.5)
WBC # FLD AUTO: 8.83 K/UL — SIGNIFICANT CHANGE UP (ref 3.8–10.5)

## 2023-02-06 PROCEDURE — 93005 ELECTROCARDIOGRAM TRACING: CPT

## 2023-02-06 PROCEDURE — 93010 ELECTROCARDIOGRAM REPORT: CPT

## 2023-02-06 NOTE — H&P PST ADULT - EKG AND INTERPRETATION
NSR with sinus arrhythmia  Right BBB  Pending final int afib  minimal voltage criteria for LVH  Prolonged QT  Pending final int.

## 2023-02-06 NOTE — H&P PST ADULT - NSICDXFAMILYHX_GEN_ALL_CORE_FT
FAMILY HISTORY:  Family history of breast cancer in mother  Family history of diabetes mellitus in mother    Sibling  Still living? No  FHx: heart disease, Age at diagnosis: Age Unknown

## 2023-02-06 NOTE — H&P PST ADULT - PROBLEM SELECTOR PLAN 1
72 year old female, former smoker, with PMH of HFpEF (LVEF 50-55%), aortic stenosis, persistent atrial fibrillation (s/p prior DCCV x 2; both unsuccessful- last ~3 years ago), HTN, hyperlipidemia, DM type 2, chronic PVD/prior vascular ulcers and morbid obesity/prior lap band (since removed) scheduled for afib ablation 2/13/2023.     Plan:   Labs pending- CBC, BMP, PT/PTT/INR, magnesium, T & S  Pre-procedure instructions provided (verbal & written) as follows:  Ablation 2/13/2023  - Last dose Eliquis 2/12/2023- (NO a/c day of ablation).    - NPO after midnight prior except meds w/ sips of water.    - Hold the following medications the morning of the procedure: Eliquis  - Metformin will be held 24 hours before surgery   -Educated on NSAIDS, multivitamins and herbals that increase the risk of bleeding and need to be stopped 5 days before procedure  -Covid testing 3-5 days prior to surgery   -Tylenol can be taken 5 days before surgery if needed for pain  -Will continue all other medications as prescribed  -Verbalized understanding of all instructions.

## 2023-02-06 NOTE — H&P PST ADULT - ASSESSMENT
72 year old female, former smoker, with PMH of HFpEF (LVEF 50-55%), aortic stenosis, persistent atrial fibrillation (s/p prior DCCV x 2; both unsuccessful- last ~3 years ago), HTN, hyperlipidemia, DM type 2, chronic PVD/prior vascular ulcers and morbid obesity/prior lap band (since removed) scheduled for afib ablation 2023.     Plan:   Labs pending- CBC, BMP, PT/PTT/INR, magnesium, T & S  Pre-procedure instructions provided (verbal & written) as follows:  Ablation 2023  - Last dose Eliquis 2023- (NO a/c day of ablation).    - NPO after midnight prior except meds w/ sips of water.    - Hold the following medications the morning of the procedure: Eliquis  - Metformin will be held 24 hours before surgery   -Educated on NSAIDS, multivitamins and herbals that increase the risk of bleeding and need to be stopped 5 days before procedure  -Covid testing 3-5 days prior to surgery   -Tylenol can be taken 5 days before surgery if needed for pain  -Will continue all other medications as prescribed  -Verbalized understanding of all instructions.    OPIOID RISK TOOL    ZIA EACH BOX THAT APPLIES AND ADD TOTALS AT THE END    FAMILY HISTORY OF SUBSTANCE ABUSE                 FEMALE         MALE                                                Alcohol                             [  ]1 pt          [  ]3pts                                               Illegal Durgs                     [  ]2 pts        [  ]3pts                                               Rx Drugs                           [  ]4 pts        [  ]4 pts    PERSONAL HISTORY OF SUBSTANCE ABUSE                                                                                          Alcohol                             [  ]3 pts       [  ]3 pts                                               Illegal Drugs                     [  ]4 pts        [  ]4 pts                                               Rx Drugs                           [  ]5 pts        [  ]5 pts    AGE BETWEEN 16-45 YEARS                                      [  ]1 pt         [  ]1 pt    HISTORY OF PREADOLESCENT   SEXUAL ABUSE                                                             [  ]3 pts        [  ]0pts    PSYCHOLOGICAL DISEASE                     ADD, OCD, Bipolar, Schizophrenia        [  ]2 pts         [  ]2 pts                      Depression                                               [  ]1 pt           [  ]1 pt           SCORING TOTAL   (add numbers and type here)              (0)                                     A score of 3 or lower indicated LOW risk for future opioid abuse  A score of 4 to 7 indicated moderate risk for future opioid abuse  A score of 8 or higher indicates a high risk for opioid abuse      CAPRINI SCORE [CLOT]    AGE RELATED RISK FACTORS                                                       MOBILITY RELATED FACTORS  [ ] Age 41-60 years                                            (1 Point)                  [ ] Bed rest                                                        (1 Point)  [ x] Age: 61-74 years                                           (2 Points)                 [ ] Plaster cast                                                   (2 Points)  [ ] Age= 75 years                                              (3 Points)                 [ ] Bed bound for more than 72 hours                 (2 Points)    DISEASE RELATED RISK FACTORS                                               GENDER SPECIFIC FACTORS  [ ] Edema in the lower extremities                       (1 Point)                  [ ] Pregnancy                                                     (1 Point)  [ ] Varicose veins                                               (1 Point)                  [ ] Post-partum < 6 weeks                                   (1 Point)             [x ] BMI > 25 Kg/m2                                            (1 Point)                  [ ] Hormonal therapy  or oral contraception          (1 Point)                 [ ] Sepsis (in the previous month)                        (1 Point)                  [ ] History of pregnancy complications                 (1 point)  [ ] Pneumonia or serious lung disease                                               [ ] Unexplained or recurrent                     (1 Point)           (in the previous month)                               (1 Point)  [ ] Abnormal pulmonary function test                     (1 Point)                 SURGERY RELATED RISK FACTORS  [ ] Acute myocardial infarction                              (1 Point)                 [ ]  Section                                             (1 Point)  [ ] Congestive heart failure (in the previous month)  (1 Point)               [ ] Minor surgery                                                  (1 Point)   [ ] Inflammatory bowel disease                             (1 Point)                 [ ] Arthroscopic surgery                                        (2 Points)  [ ] Central venous access                                      (2 Points)                [ x] General surgery lasting more than 45 minutes   (2 Points)       [ ] Stroke (in the previous month)                          (5 Points)               [ ] Elective arthroplasty                                         (5 Points)                                                                                                                                               HEMATOLOGY RELATED FACTORS                                                 TRAUMA RELATED RISK FACTORS  [ ] Prior episodes of VTE                                     (3 Points)                [ ] Fracture of the hip, pelvis, or leg                       (5 Points)  [ ] Positive family history for VTE                         (3 Points)                 [ ] Acute spinal cord injury (in the previous month)  (5 Points)  [ ] Prothrombin 70657 A                                     (3 Points)                 [ ] Paralysis  (less than 1 month)                             (5 Points)  [ ] Factor V Leiden                                             (3 Points)                  [ ] Multiple Trauma within 1 month                        (5 Points)  [ ] Lupus anticoagulants                                     (3 Points)                                                           [ ] Anticardiolipin antibodies                               (3 Points)                                                       [ ] High homocysteine in the blood                      (3 Points)                                             [ ] Other congenital or acquired thrombophilia      (3 Points)                                                [ ] Heparin induced thrombocytopenia                  (3 Points)                                          Total Score [    5      ]    Caprini Score 0 - 2:  Low Risk, No VTE Prophylaxis required for most patients, encourage ambulation  Caprini Score 3 - 6:  At Risk, pharmacologic VTE prophylaxis is indicated for most patients (in the absence of a contraindication)  Caprini Score Greater than or = 7:  High Risk, pharmacologic VTE prophylaxis is indicated for most patients (in the absence of a contraindication)

## 2023-02-06 NOTE — H&P PST ADULT - NSICDXPASTSURGICALHX_GEN_ALL_CORE_FT
PAST SURGICAL HISTORY:  H/O colonoscopy     History of cholecystectomy     History of esophagogastroduodenoscopy (EGD)     Other complications of gastric band procedure     S/P left knee arthroscopy     Status post medial meniscus repair      PAST SURGICAL HISTORY:  H/O colonoscopy     History of cholecystectomy     History of esophagogastroduodenoscopy (EGD)     Other complications of gastric band procedure     S/P cataract surgery     S/P left knee arthroscopy     Status post medial meniscus repair

## 2023-02-06 NOTE — ED POST DISCHARGE NOTE - DETAILS
+ESBL E. coli. Spoke with patient. Urinalysis NEG. Patient is completely asymptomatic. Rec repeat UA c&s. Patient states she "can't be bothered", will f/u when she has time. ~Michael Dodd PA-C

## 2023-02-06 NOTE — H&P PST ADULT - NSICDXPASTMEDICALHX_GEN_ALL_CORE_FT
PAST MEDICAL HISTORY:  Atrial fibrillation     Cellulitis     Congestive heart failure     Diabetes mellitus type II, controlled     Dyspnea     HTN (hypertension)     Morbid obesity with BMI of 40.0-44.9, adult     Neuropathy     Peripheral venous insufficiency     Thyroid nodule      PAST MEDICAL HISTORY:  At risk for sleep apnea     Atrial fibrillation     Cellulitis     Congestive heart failure     Diabetes mellitus type II, controlled     Dyspnea     HTN (hypertension)     Morbid obesity with BMI of 40.0-44.9, adult     Neuropathy     Peripheral venous insufficiency     Thyroid nodule

## 2023-02-06 NOTE — H&P PST ADULT - RESPIRATORY RATE (BREATHS/MIN)
Cephalexin Pregnancy And Lactation Text: This medication is Pregnancy Category B and considered safe during pregnancy.  It is also excreted in breast milk but can be used safely for shorter doses. 19

## 2023-02-06 NOTE — H&P PST ADULT - HISTORY OF PRESENT ILLNESS
72 year old female, former smoker, with PMH of HFpEF (LVEF 50-55%), aortic stenosis, persistent atrial fibrillation (s/p prior DCCV x 2; both unsuccessful- last ~3 years ago), HTN, hyperlipidemia, DM type 2, chronic PVD/prior vascular ulcers and morbid obesity/prior lap band (since removed). She recently had worsening JETT with light exertion and lower extremity edema. Outpatient TTE showed worsening aortic stenosis and she was referred to [facility] by Dr. George for further evaluation and consideration for TAVR. Repeat echo demonstrated moderate AS, LHC on  showed no significant aortic valve gradients, some CAD noted. Symptoms were out of proportion to severity of AS. LHC was consistent with only moderate AS. ASHLEY did not demonstrate any ECHO thrombus. Underwent successful DCCV 22 and was started on amiodarone prior to discharge.     Post discharge she has followed up with Dr. Hopper who recommended considering enrolling in Expand II trial for moderate AS however she wished to defer this option.     Today, notes symptomatic improvement in SR with less JETT. Notes increased exercise tolerance. Maintained on eliquis for stroke prophylaxis. Denies easy bruising, bleeding, or falls. Reports eliquis is cost prohibitive.     ECG reveals maintained in SR however she does report 1 hour episode that she believes was AF with palpitations/ heart racing sensation. Reports itching since discharge which resolved when she resumed prednisone (prescribed for neuropathy), believes started after initiating amiodarone.        EC23: Sinus Bradycardia 57 BPM   Stress Test: DSE 22: Nondiagnostic study given incongruent Doppler profiles (between gradient and VTI) at peak dose of dobutamine, and only reached 69% MPHR. Consider alternative imaging modality such as CT aortic valve calcium score to confirm severity.     Echo: TTE 22: LVEF 50-55%, LVIDd 5.11cm, IVSd 1.2cm, mild LAE, severe AGUILA, moderate RVE with low/normal RV function, mild AI, moderate AS (MG 23, PV 3.02m/s), mild MR, moderate TR, PASP 41.4 mmHg (RAP 3 mmHg).       Cardiac Cath/PCI: RHC: full report pending, RA 24, RV 62/22 PA 63/32 (46), PCWP 18, CO 6.4/ CI 2.98, PA sat 53%.PVR 2.34   LHC: RCA 80%,    72 year old female, former smoker, with PMH of HFpEF (LVEF 50-55%), aortic stenosis, persistent atrial fibrillation (s/p prior DCCV x 2; both unsuccessful- last ~3 years ago), HTN, hyperlipidemia, DM type 2, chronic PVD/prior vascular ulcers and morbid obesity/prior lap band (since removed) presents to PST today. Reports hx of afib for more than 5 years. She recently had worsening JETT with light exertion and lower extremity edema. Outpatient TTE showed worsening aortic stenosis and she was referred to [facility] by Dr. George for further evaluation and consideration for TAVR. Repeat echo demonstrated moderate AS, LHC on  showed no significant aortic valve gradients, some CAD noted. LHC was consistent with only moderate AS. ASHLEY did not demonstrate any ECHO thrombus. Underwent successful DCCV 22 and was started on amiodarone prior to discharge. Pt. currently takes eliquis, amiodarone, metoprolol, losartan. Denies any chest pain, palpitations. Reports OSB with exertion still present but not as severe as previously noted.     Post discharge she has followed up with Dr. Hopper who recommended considering enrolling in Expand II trial for moderate AS however she wished to defer this option.     Today, notes symptomatic improvement in SR with less JETT. Notes increased exercise tolerance. Maintained on eliquis for stroke prophylaxis. Denies easy bruising, bleeding, or falls. Reports eliquis is cost prohibitive.     ECG reveals maintained in SR however she does report 1 hour episode that she believes was AF with palpitations/ heart racing sensation. Reports itching since discharge which resolved when she resumed prednisone (prescribed for neuropathy), believes started after initiating amiodarone.        EC23: Sinus Bradycardia 57 BPM   Stress Test: DSE 22: Nondiagnostic study given incongruent Doppler profiles (between gradient and VTI) at peak dose of dobutamine, and only reached 69% MPHR. Consider alternative imaging modality such as CT aortic valve calcium score to confirm severity.     Echo: TTE 22: LVEF 50-55%, LVIDd 5.11cm, IVSd 1.2cm, mild LAE, severe AGUILA, moderate RVE with low/normal RV function, mild AI, moderate AS (MG 23, PV 3.02m/s), mild MR, moderate TR, PASP 41.4 mmHg (RAP 3 mmHg).       Cardiac Cath/PCI: RHC: full report pending, RA 24, RV 62/22 PA 63/32 (46), PCWP 18, CO 6.4/ CI 2.98, PA sat 53%.PVR 2.34   LHC: RCA 80%,    72 year old female, former smoker, with PMH of HFpEF (LVEF 50-55%), aortic stenosis, persistent atrial fibrillation (s/p prior DCCV x 2; both unsuccessful- last ~3 years ago), HTN, hyperlipidemia, DM type 2, chronic PVD/prior vascular ulcers and morbid obesity/prior lap band (since removed) presents to [facility] today. Reports hx of afib for more than 5 years. She recently had worsening JETT with light exertion and lower extremity edema. Outpatient TTE showed worsening aortic stenosis and she was referred to [facility] by Dr. George for further evaluation and consideration for TAVR. Repeat echo demonstrated moderate AS, LHC on 12/20 showed no significant aortic valve gradients, some CAD noted. LHC was consistent with only moderate AS. ASHLEY did not demonstrate any ECHO thrombus. Underwent successful DCCV 12/22/22 and was started on amiodarone prior to discharge. Pt. currently takes eliquis, amiodarone, metoprolol, losartan. Denies any chest pain, palpitations. Reports OSB with exertion still present but not as severe as previously noted. Scheduled for afib ablation 2/13/2023.     ECG  1/6/23: Sinus Bradycardia 57 BPM   Echocardiogram (date):  TTE 12/14/22: LVEF 50-55%, LVIDd 5.11cm, IVSd 1.2cm, mild LAE, severe AGUILA, moderate RVE with low/normal RV function, mild AI, moderate AS (MG 23, PV 3.02m/s), mild MR, moderate TR, PASP 41.4 mmHg (RAP 3 mmHg).  Stress Test (date): 12/23/22: Nondiagnostic study given incongruent Doppler profiles (between gradient and VTI) at peak dose of dobutamine, and only reached 69% MPHR. Consider alternative imaging modality such as CT aortic valve calcium score to confirm severity.  Cardiac CT or MRI (date): n/a  Cardiac Cath (date): RHC: full report pending, RA 24, RV 62/22 PA 63/32 (46), PCWP 18, CO 6.4/ CI 2.98, PA sat 53%.PVR 2.34   LHC: RCA 80%,   Cardiac surgery (date): Pending afib ablation on 2/13/2023.          72 year old female, former smoker, with PMH of HFpEF (LVEF 50-55%), aortic stenosis, persistent atrial fibrillation (s/p prior DCCV x 2; both unsuccessful- last ~3 years ago), HTN, hyperlipidemia, DM type 2, chronic PVD/prior vascular ulcers and morbid obesity/prior lap band (since removed) presents to [facility] today. Reports hx of afib for more than 5 years. She recently had worsening JETT with light exertion and lower extremity edema. Outpatient TTE showed worsening aortic stenosis and she was referred to [facility] by Dr. George for further evaluation and consideration for TAVR. Repeat echo demonstrated moderate AS, LHC on 12/20 showed no significant aortic valve gradients, some CAD noted. LHC was consistent with only moderate AS. ASHLEY did not demonstrate any ECHO thrombus. Underwent successful DCCV 12/22/22 and was started on amiodarone prior to discharge. Pt. currently takes eliquis, amiodarone, metoprolol, losartan. Denies any chest pain, palpitations. Reports OSB with exertion still present but not as severe as previously noted. Scheduled for afib ablation 2/13/2023.     ECG  2/2023 afib  minimal voltage criteria for LVH  Prolonged QT  1/6/23: Sinus Bradycardia 57 BPM   Echocardiogram (date):  TTE 12/14/22: LVEF 50-55%, LVIDd 5.11cm, IVSd 1.2cm, mild LAE, severe AGUILA, moderate RVE with low/normal RV function, mild AI, moderate AS (MG 23, PV 3.02m/s), mild MR, moderate TR, PASP 41.4 mmHg (RAP 3 mmHg).  Stress Test (date): 12/23/22: Nondiagnostic study given incongruent Doppler profiles (between gradient and VTI) at peak dose of dobutamine, and only reached 69% MPHR. Consider alternative imaging modality such as CT aortic valve calcium score to confirm severity.  Cardiac CT or MRI (date): n/a  Cardiac Cath (date): RHC: full report pending, RA 24, RV 62/22 PA 63/32 (46), PCWP 18, CO 6.4/ CI 2.98, PA sat 53%.PVR 2.34   LHC: RCA 80%,   Cardiac surgery (date): Pending afib ablation on 2/13/2023.

## 2023-02-06 NOTE — H&P PST ADULT - MUSCULOSKELETAL
details… no joint erythema/no joint warmth/decreased ROM due to pain/strength 5/5 bilateral upper extremities/strength 5/5 bilateral lower extremities/abnormal gait

## 2023-02-07 RX ORDER — ADHESIVE TAPE 3"X 2.3 YD
200 TAPE, NON-MEDICATED TOPICAL TWICE DAILY
Qty: 60 | Refills: 0 | Status: ACTIVE | COMMUNITY
Start: 2023-02-07 | End: 1900-01-01

## 2023-02-10 NOTE — DISCUSSION/SUMMARY
[EKG obtained to assist in diagnosis and management of assessed problem(s)] : EKG obtained to assist in diagnosis and management of assessed problem(s) [FreeTextEntry1] : 72 year old female, former smoker, with PMH of HFpEF (LVEF 50-55%), aortic stenosis, persistent atrial fibrillation (s/p prior DCCV x 2; both unsuccessful- last ~3 years ago), HTN, hyperlipidemia, DM type 2, chronic PVD/prior vascular ulcers and morbid obesity/prior lap band (since removed). She recently had worsening JETT with light exertion and lower extremity edema. Outpatient TTE showed worsening aortic stenosis and she was referred to [facility] by Dr. George for further evaluation and consideration for TAVR. Repeat echo demonstrated moderate AS, LHC on 12/20 showed no significant aortic valve gradients, some CAD noted. Symptoms were out of proportion to severity of AS. LHC was consistent with only moderate AS. ASHLEY did not demonstrate any ECHO thrombus. Underwent successful DCCV 12/22/22 and was started on amiodarone prior to discharge. \par \par Post discharge she has followed up with Dr. Hopper who recommended considering enrolling in Expand II trial for moderate AS however she wished to defer this option. \par \par Today, notes symptomatic improvement in SR with less JETT. Notes increased exercise tolerance. Maintained on eliquis for stroke prophylaxis. Denies easy bruising, bleeding, or falls. Reports eliquis is cost prohibitive. \par \par ECG reveals maintained in SR however she does report 1 hour episode that she believes was AF with palpitations/ heart racing sensation. Reports itching since discharge which resolved when she resumed prednisone (prescribed for neuropathy), believes started after initiating amiodarone. \par \par Recommendation:\par \par Ms. Trotter is doing well post discharge with symptomatic improvement in SR post cardioversion on amiodarone. Long term potential toxicities associated with amiodarone discussed. Rhythm control strategies including catheter ablatio reviewed including procedure related risks such as bleeding, vascular injury, stroke, MI, cardiac perforation, and esophageal injury were reviewed. She wishes to move forward with ablation. \par -In regards to itching, potential side effect of amiodarone. Will reduce amiodarone to 100mg daily. Plan to continue for 1-2 months post ablation if possible.\par -Continue ELiquis 5mg BID for stroke prophylaxis. Will look into cost savings programs. \par \par Sara Vences ANP-C \par

## 2023-02-10 NOTE — HISTORY OF PRESENT ILLNESS
[FreeTextEntry1] : 72 year old female, former smoker, with PMH of HFpEF (LVEF 50-55%), aortic stenosis, persistent atrial fibrillation (s/p prior DCCV x 2; both unsuccessful- last ~3 years ago), HTN, hyperlipidemia, DM type 2, chronic PVD/prior vascular ulcers and morbid obesity/prior lap band (since removed). She recently had worsening JETT with light exertion and lower extremity edema. Outpatient TTE showed worsening aortic stenosis and she was referred to [facility] by Dr. George for further evaluation and consideration for TAVR. Repeat echo demonstrated moderate AS, LHC on 12/20 showed no significant aortic valve gradients, some CAD noted. Symptoms were out of proportion to severity of AS. LHC was consistent with only moderate AS. ASHLEY did not demonstrate any ECHO thrombus. Underwent successful DCCV 12/22/22 and was started on amiodarone prior to discharge. \par \par Post discharge she has followed up with Dr. Hopper who recommended considering enrolling in Expand II trial for moderate AS however she wished to defer this option. \par \par Today, notes symptomatic improvement in SR with less JETT. Notes increased exercise tolerance. Maintained on eliquis for stroke prophylaxis. Denies easy bruising, bleeding, or falls. Reports eliquis is cost prohibitive. \par \par ECG reveals maintained in SR however she does report 1 hour episode that she believes was AF with palpitations/ heart racing sensation. Reports itching since discharge which resolved when she resumed prednisone (prescribed for neuropathy), believes started after initiating amiodarone.

## 2023-02-13 ENCOUNTER — INPATIENT (INPATIENT)
Facility: HOSPITAL | Age: 73
LOS: 0 days | Discharge: HOME | DRG: 274 | End: 2023-02-14
Attending: INTERNAL MEDICINE | Admitting: INTERNAL MEDICINE
Payer: COMMERCIAL

## 2023-02-13 ENCOUNTER — TRANSCRIPTION ENCOUNTER (OUTPATIENT)
Age: 73
End: 2023-02-13

## 2023-02-13 VITALS
TEMPERATURE: 97 F | HEIGHT: 70 IN | HEART RATE: 86 BPM | SYSTOLIC BLOOD PRESSURE: 155 MMHG | OXYGEN SATURATION: 97 % | RESPIRATION RATE: 17 BRPM | DIASTOLIC BLOOD PRESSURE: 76 MMHG

## 2023-02-13 DIAGNOSIS — Z98.890 OTHER SPECIFIED POSTPROCEDURAL STATES: Chronic | ICD-10-CM

## 2023-02-13 DIAGNOSIS — Z90.49 ACQUIRED ABSENCE OF OTHER SPECIFIED PARTS OF DIGESTIVE TRACT: Chronic | ICD-10-CM

## 2023-02-13 DIAGNOSIS — Z98.49 CATARACT EXTRACTION STATUS, UNSPECIFIED EYE: Chronic | ICD-10-CM

## 2023-02-13 DIAGNOSIS — K95.09 OTHER COMPLICATIONS OF GASTRIC BAND PROCEDURE: Chronic | ICD-10-CM

## 2023-02-13 DIAGNOSIS — I48.0 PAROXYSMAL ATRIAL FIBRILLATION: ICD-10-CM

## 2023-02-13 LAB — MAGNESIUM SERPL-MCNC: 1.7 MG/DL — LOW (ref 1.8–2.6)

## 2023-02-13 PROCEDURE — 93657 TX L/R ATRIAL FIB ADDL: CPT

## 2023-02-13 PROCEDURE — 93656 COMPRE EP EVAL ABLTJ ATR FIB: CPT

## 2023-02-13 PROCEDURE — 99232 SBSQ HOSP IP/OBS MODERATE 35: CPT

## 2023-02-13 RX ORDER — OXYCODONE HYDROCHLORIDE 5 MG/1
5 TABLET ORAL EVERY 6 HOURS
Refills: 0 | Status: DISCONTINUED | OUTPATIENT
Start: 2023-02-13 | End: 2023-02-14

## 2023-02-13 RX ORDER — GABAPENTIN 400 MG/1
800 CAPSULE ORAL EVERY 8 HOURS
Refills: 0 | Status: DISCONTINUED | OUTPATIENT
Start: 2023-02-13 | End: 2023-02-14

## 2023-02-13 RX ORDER — FUROSEMIDE 40 MG
40 TABLET ORAL DAILY
Refills: 0 | Status: DISCONTINUED | OUTPATIENT
Start: 2023-02-13 | End: 2023-02-14

## 2023-02-13 RX ORDER — MONTELUKAST 4 MG/1
10 TABLET, CHEWABLE ORAL AT BEDTIME
Refills: 0 | Status: DISCONTINUED | OUTPATIENT
Start: 2023-02-13 | End: 2023-02-14

## 2023-02-13 RX ORDER — MAGNESIUM SULFATE 500 MG/ML
2 VIAL (ML) INJECTION ONCE
Refills: 0 | Status: COMPLETED | OUTPATIENT
Start: 2023-02-13 | End: 2023-02-13

## 2023-02-13 RX ORDER — ALBUTEROL 90 UG/1
2 AEROSOL, METERED ORAL EVERY 6 HOURS
Refills: 0 | Status: DISCONTINUED | OUTPATIENT
Start: 2023-02-13 | End: 2023-02-14

## 2023-02-13 RX ORDER — AMIODARONE HYDROCHLORIDE 400 MG/1
100 TABLET ORAL DAILY
Refills: 0 | Status: DISCONTINUED | OUTPATIENT
Start: 2023-02-13 | End: 2023-02-14

## 2023-02-13 RX ORDER — MAGNESIUM OXIDE 400 MG ORAL TABLET 241.3 MG
400 TABLET ORAL DAILY
Refills: 0 | Status: DISCONTINUED | OUTPATIENT
Start: 2023-02-13 | End: 2023-02-14

## 2023-02-13 RX ORDER — BENZOCAINE AND MENTHOL 5; 1 G/100ML; G/100ML
1 LIQUID ORAL
Refills: 0 | Status: DISCONTINUED | OUTPATIENT
Start: 2023-02-13 | End: 2023-02-14

## 2023-02-13 RX ORDER — ALPRAZOLAM 0.25 MG
0.25 TABLET ORAL EVERY 8 HOURS
Refills: 0 | Status: DISCONTINUED | OUTPATIENT
Start: 2023-02-13 | End: 2023-02-14

## 2023-02-13 RX ORDER — FUROSEMIDE 40 MG
20 TABLET ORAL ONCE
Refills: 0 | Status: COMPLETED | OUTPATIENT
Start: 2023-02-13 | End: 2023-02-13

## 2023-02-13 RX ORDER — SUCRALFATE 1 G
1 TABLET ORAL EVERY 12 HOURS
Refills: 0 | Status: DISCONTINUED | OUTPATIENT
Start: 2023-02-13 | End: 2023-02-14

## 2023-02-13 RX ORDER — PANTOPRAZOLE SODIUM 20 MG/1
40 TABLET, DELAYED RELEASE ORAL EVERY 12 HOURS
Refills: 0 | Status: DISCONTINUED | OUTPATIENT
Start: 2023-02-13 | End: 2023-02-14

## 2023-02-13 RX ORDER — ATORVASTATIN CALCIUM 80 MG/1
10 TABLET, FILM COATED ORAL AT BEDTIME
Refills: 0 | Status: DISCONTINUED | OUTPATIENT
Start: 2023-02-13 | End: 2023-02-14

## 2023-02-13 RX ORDER — LOSARTAN POTASSIUM 100 MG/1
25 TABLET, FILM COATED ORAL DAILY
Refills: 0 | Status: DISCONTINUED | OUTPATIENT
Start: 2023-02-13 | End: 2023-02-14

## 2023-02-13 RX ORDER — METOPROLOL TARTRATE 50 MG
50 TABLET ORAL DAILY
Refills: 0 | Status: DISCONTINUED | OUTPATIENT
Start: 2023-02-13 | End: 2023-02-14

## 2023-02-13 RX ORDER — ACETAMINOPHEN 500 MG
650 TABLET ORAL EVERY 6 HOURS
Refills: 0 | Status: DISCONTINUED | OUTPATIENT
Start: 2023-02-13 | End: 2023-02-14

## 2023-02-13 RX ORDER — CHOLESTYRAMINE 4 G/9G
4 POWDER, FOR SUSPENSION ORAL DAILY
Refills: 0 | Status: DISCONTINUED | OUTPATIENT
Start: 2023-02-13 | End: 2023-02-14

## 2023-02-13 RX ORDER — ONDANSETRON 8 MG/1
4 TABLET, FILM COATED ORAL EVERY 6 HOURS
Refills: 0 | Status: DISCONTINUED | OUTPATIENT
Start: 2023-02-13 | End: 2023-02-14

## 2023-02-13 RX ORDER — APIXABAN 2.5 MG/1
5 TABLET, FILM COATED ORAL
Refills: 0 | Status: DISCONTINUED | OUTPATIENT
Start: 2023-02-13 | End: 2023-02-14

## 2023-02-13 RX ADMIN — GABAPENTIN 800 MILLIGRAM(S): 400 CAPSULE ORAL at 20:08

## 2023-02-13 RX ADMIN — Medication 650 MILLIGRAM(S): at 23:19

## 2023-02-13 RX ADMIN — MONTELUKAST 10 MILLIGRAM(S): 4 TABLET, CHEWABLE ORAL at 21:29

## 2023-02-13 RX ADMIN — ATORVASTATIN CALCIUM 10 MILLIGRAM(S): 80 TABLET, FILM COATED ORAL at 21:29

## 2023-02-13 RX ADMIN — OXYCODONE HYDROCHLORIDE 5 MILLIGRAM(S): 5 TABLET ORAL at 18:01

## 2023-02-13 RX ADMIN — OXYCODONE HYDROCHLORIDE 5 MILLIGRAM(S): 5 TABLET ORAL at 17:59

## 2023-02-13 RX ADMIN — Medication 25 GRAM(S): at 10:48

## 2023-02-13 RX ADMIN — Medication 20 MILLIGRAM(S): at 18:58

## 2023-02-13 RX ADMIN — Medication 1 GRAM(S): at 17:59

## 2023-02-13 RX ADMIN — PANTOPRAZOLE SODIUM 40 MILLIGRAM(S): 20 TABLET, DELAYED RELEASE ORAL at 17:59

## 2023-02-13 RX ADMIN — APIXABAN 5 MILLIGRAM(S): 2.5 TABLET, FILM COATED ORAL at 21:29

## 2023-02-13 NOTE — DISCHARGE NOTE PROVIDER - HOSPITAL COURSE
72 year old female, former smoker, with PMH of HTN, hyperlipidemia, DM type 2, aortic stenosis, CAD, PVD w/ prior vascular ulcers, morbid obesity BMI 40, HFpEF (EF 50-55%), and persistent atrial fibrillation s/p prior cardioversion. Pt presented 2/13/23 for and is now s/p elective radiofrequency atrial fibrillation ablation (WACA/PVI, posterior box, CTI line), access achieved via b/l femoral veins. 72 year old female, former smoker, with PMH of HTN, hyperlipidemia, DM type 2, aortic stenosis, CAD, PVD w/ prior vascular ulcers, morbid obesity BMI 40, HFpEF (EF 50-55%), and persistent atrial fibrillation s/p prior cardioversion. Now POD#1 s/p atrial fibrillation ablation (WACA/PVI, posterior box, CTI line). The patient was observed per post procedure protocol, then discharged home with a plan for outpatient follow up.

## 2023-02-13 NOTE — DISCHARGE NOTE PROVIDER - CARE PROVIDER_API CALL
Rocky Estes)  Cardiac Electrophysiology; Cardiovascular Disease; Internal Medicine  [address], [address]  [place] [address]  Phone: [phone]  Fax: [phone]  Follow Up Time:     Justo Mayer  CARDIOVASCULAR DISEASE  [address], [address]  [place] [address]  Phone: [phone]  Fax: [phone]  Follow Up Time:

## 2023-02-13 NOTE — PROGRESS NOTE ADULT - SUBJECTIVE AND OBJECTIVE BOX
PROCEDURE(S): Radiofrequency Ablation of Atrial Fibrillation    ELECTROPHYSIOLOGIST(S): Rocky Estes MD         COMPLICATIONS:  none        DISPOSITION: observation      CONDITION: stable      Pt doing well s/p elective radiofrequency atrial fibrillation ablation (WACA/PVI, posterior box, CTI line), access achieved via b/l femoral veins. Pt sleepy post anesthesia but reports no complaints.     MEDICATIONS  (STANDING):  aMIOdarone    Tablet 100 milliGRAM(s) Oral daily  apixaban 5 milliGRAM(s) Oral <User Schedule>  cholestyramine Powder (Sugar-Free) 4 Gram(s) Oral daily  furosemide    Tablet 40 milliGRAM(s) Oral daily  furosemide   Injectable 20 milliGRAM(s) IV Push once  losartan 25 milliGRAM(s) Oral daily  magnesium oxide 400 milliGRAM(s) Oral daily  metoprolol succinate ER 50 milliGRAM(s) Oral daily  montelukast 10 milliGRAM(s) Oral at bedtime  pantoprazole    Tablet 40 milliGRAM(s) Oral every 12 hours  predniSONE   Tablet 5 milliGRAM(s) Oral daily  sucralfate suspension 1 Gram(s) Oral every 12 hours    MEDICATIONS  (PRN):  acetaminophen     Tablet .. 650 milliGRAM(s) Oral every 6 hours PRN Moderate Pain (4 - 6)  albuterol    90 MICROgram(s) HFA Inhaler 2 Puff(s) Inhalation every 6 hours PRN Shortness of Breath  ALPRAZolam 0.25 milliGRAM(s) Oral every 8 hours PRN anxiety / insomnia  benzocaine/menthol Lozenge 1 Lozenge Oral every 2 hours PRN Sore Throat  gabapentin 800 milliGRAM(s) Oral every 8 hours PRN Neuropathic pain  ondansetron Injectable 4 milliGRAM(s) IV Push every 6 hours PRN Nausea and/or Vomiting  oxyCODONE    IR 5 milliGRAM(s) Oral every 6 hours PRN Severe Pain (7 - 10)      Allergies    IV DYE, IODINE CONTRAST (Unknown)  latex (Unknown)  penicillin (Hives; Urticaria)    Intolerances          VS:  T(C): 36.3 (02-13-23 @ 10:13), Max: 36.3 (02-13-23 @ 10:13)  HR: 55 (02-13-23 @ 15:55) (55 - 86)  BP: 135/62 (02-13-23 @ 15:55) (135/62 - 155/76)  RR: 17 (02-13-23 @ 15:55) (17 - 17)  SpO2: 95% (02-13-23 @ 15:55) (95% - 97%)  Wt(kg): --        Post procedure VS:  HR: 58  BP: 133/52  RR: 18  SpO2: 98%      Exam:  General: NAD, A&O x 3  Card: S1/S2, RRR, no m/g/r  Resp: lungs CTA b/l  Abd: S/NT/ND  Groins: hemostatic sutures in place; sites C/D/I; no bleeding, hematoma, erythema, exudate or edema  Ext: no edema; distal pulses intact    I/O's    Input: 1685    ASHLEY: Deferred as pt reports strict compliance with AC. ECHO cleared intraoperatively with ICE catheter.    ECG: SR with intact conduction. Qtc < 500 on manual calculation.    Assessment:   72 year old female, former smoker, with PMH of HTN, hyperlipidemia, DM type 2, aortic stenosis, CAD, PVD w/ prior vascular ulcers, morbid obesity BMI 40, HFpEF (EF 50-55%), and persistent atrial fibrillation s/p prior cardioversion. Pt presented today 2/13/23 for and is now s/p elective radiofrequency atrial fibrillation ablation (WACA/PVI, posterior box, CTI line), access achieved via b/l femoral veins. Pt sleepy post anesthesia but reports no complaints.         Plan:   Bedrest x 4 hours, then OOB with assistance and progress as tolerated.   Groin sutures to be removed by EP service in AM.   Radial art line to be removed once pt fully awake with stable vitals >1 hour.    Pending groin status: Resume Eliquis 5mg Q12HR starting @ 1930  Lasix 20mg IVP @ 1930, then to resume home dose of 40mg QD  Start Protonix 40mg BID x 2 weeks then daily X 6 weeks.     Start Carafate 1gm BID x 2 weeks.   DO NOT HOLD, INTERRUPT OR REVERSE ANTICOAGULATION WITHOUT EXPLICIT APPROVAL FROM EP SERVICE  Reduce Metoprolol succinate to 50mg QD  Continue other home medications.   Strict I/Os.  Please encourage incentive spirometry and ambulation once able.  Observation and monitoring on telemetry overnight with anticipated discharge in the AM and outpt follow up in 1 month.   PROCEDURE(S): Radiofrequency Ablation of Atrial Fibrillation    ELECTROPHYSIOLOGIST(S): Rocky Estes MD         COMPLICATIONS:  none        DISPOSITION: observation      CONDITION: stable      Pt doing well s/p elective radiofrequency atrial fibrillation ablation (WACA/PVI, posterior box, CTI line), access achieved via b/l femoral veins. Pt sleepy post anesthesia but reports no complaints.     MEDICATIONS  (STANDING):  aMIOdarone    Tablet 100 milliGRAM(s) Oral daily  apixaban 5 milliGRAM(s) Oral <User Schedule>  cholestyramine Powder (Sugar-Free) 4 Gram(s) Oral daily  furosemide    Tablet 40 milliGRAM(s) Oral daily  furosemide   Injectable 20 milliGRAM(s) IV Push once  losartan 25 milliGRAM(s) Oral daily  magnesium oxide 400 milliGRAM(s) Oral daily  metoprolol succinate ER 50 milliGRAM(s) Oral daily  montelukast 10 milliGRAM(s) Oral at bedtime  pantoprazole    Tablet 40 milliGRAM(s) Oral every 12 hours  predniSONE   Tablet 5 milliGRAM(s) Oral daily  sucralfate suspension 1 Gram(s) Oral every 12 hours    MEDICATIONS  (PRN):  acetaminophen     Tablet .. 650 milliGRAM(s) Oral every 6 hours PRN Moderate Pain (4 - 6)  albuterol    90 MICROgram(s) HFA Inhaler 2 Puff(s) Inhalation every 6 hours PRN Shortness of Breath  ALPRAZolam 0.25 milliGRAM(s) Oral every 8 hours PRN anxiety / insomnia  benzocaine/menthol Lozenge 1 Lozenge Oral every 2 hours PRN Sore Throat  gabapentin 800 milliGRAM(s) Oral every 8 hours PRN Neuropathic pain  ondansetron Injectable 4 milliGRAM(s) IV Push every 6 hours PRN Nausea and/or Vomiting  oxyCODONE    IR 5 milliGRAM(s) Oral every 6 hours PRN Severe Pain (7 - 10)      Allergies    IV DYE, IODINE CONTRAST (Unknown)  latex (Unknown)  penicillin (Hives; Urticaria)    Intolerances          VS:  T(C): 36.3 (02-13-23 @ 10:13), Max: 36.3 (02-13-23 @ 10:13)  HR: 55 (02-13-23 @ 15:55) (55 - 86)  BP: 135/62 (02-13-23 @ 15:55) (135/62 - 155/76)  RR: 17 (02-13-23 @ 15:55) (17 - 17)  SpO2: 95% (02-13-23 @ 15:55) (95% - 97%)  Wt(kg): --        Post procedure VS:  HR: 58  BP: 133/52  RR: 18  SpO2: 98%      Exam:  General: NAD, A&O x 3  Card: S1/S2, RRR, no m/g/r  Resp: lungs CTA b/l  Abd: S/NT/ND  Groins: hemostatic sutures in place; sites C/D/I; no bleeding, hematoma, erythema, exudate or edema  Ext: no edema; distal pulses intact    I/O's    Input: 1685    ASHLEY: Deferred as pt reports strict compliance with AC. ECHO cleared intraoperatively with ICE catheter.    ECG: SR with intact conduction. Qtc < 500 on manual calculation.    Assessment:   72 year old female, former smoker, with PMH of HTN, hyperlipidemia, DM type 2, aortic stenosis, CAD, PVD w/ prior vascular ulcers, morbid obesity BMI 40, HFpEF (EF 50-55%), and persistent atrial fibrillation s/p prior cardioversion. Pt presented today 2/13/23 for and is now s/p elective radiofrequency atrial fibrillation ablation (WACA/PVI, posterior box, CTI line), access achieved via b/l femoral veins. Pt sleepy post anesthesia but reports no complaints.         Plan:   Bedrest x 4 hours, then OOB with assistance and progress as tolerated.   Groin sutures to be removed by EP service in AM.   Radial art line to be removed once pt fully awake with stable vitals >1 hour.    Pending groin status: Resume Eliquis 5mg Q12HR starting @ 1930  Lasix 20mg IVP @ 1930, then to resume home dose of 40mg QD. Hold Aldactone while on furosemide.  Start Protonix 40mg BID x 2 weeks then daily X 6 weeks.     Start Carafate 1gm BID x 2 weeks.   DO NOT HOLD, INTERRUPT OR REVERSE ANTICOAGULATION WITHOUT EXPLICIT APPROVAL FROM EP SERVICE  Reduce Metoprolol succinate to 50mg QD  Continue other home medications.   Strict I/Os.  Please encourage incentive spirometry and ambulation once able.  Observation and monitoring on telemetry overnight with anticipated discharge in the AM and outpt follow up in 1 month.

## 2023-02-13 NOTE — PROGRESS NOTE ADULT - SUBJECTIVE AND OBJECTIVE BOX
72 year old female, former smoker, with PMH of HTN, hyperlipidemia, DM type 2, aortic stenosis, CAD, PVD w/ prior vascular ulcers, morbid obesity BMI 40, HFpEF (EF 50-55%), and persistent atrial fibrillation s/p prior cardioversion. She presents today for elective radiofrequency ablation of atrial fibrillation.     PST and labs from 2/6/23 reviewed; denies interval change.   Notably, her Mg was 1.4, will repeat level now.   Confirms NPO > 8 hrs, last dose Eliquis 2/12/23.     Will defer ASHLEY and clear appendage w/ ICE per Dr. Estes     - iv heplock  - confirmatory type and screen  - 2 units PRBC on hold   - repeat Mg   - consent w/ MD

## 2023-02-14 ENCOUNTER — TRANSCRIPTION ENCOUNTER (OUTPATIENT)
Age: 73
End: 2023-02-14

## 2023-02-14 VITALS — HEART RATE: 66 BPM | OXYGEN SATURATION: 96 % | RESPIRATION RATE: 22 BRPM

## 2023-02-14 LAB
ANION GAP SERPL CALC-SCNC: 11 MMOL/L — SIGNIFICANT CHANGE UP (ref 5–17)
BUN SERPL-MCNC: 15.2 MG/DL — SIGNIFICANT CHANGE UP (ref 8–20)
CALCIUM SERPL-MCNC: 8.6 MG/DL — SIGNIFICANT CHANGE UP (ref 8.4–10.5)
CHLORIDE SERPL-SCNC: 103 MMOL/L — SIGNIFICANT CHANGE UP (ref 96–108)
CO2 SERPL-SCNC: 24 MMOL/L — SIGNIFICANT CHANGE UP (ref 22–29)
CREAT SERPL-MCNC: 0.65 MG/DL — SIGNIFICANT CHANGE UP (ref 0.5–1.3)
EGFR: 93 ML/MIN/1.73M2 — SIGNIFICANT CHANGE UP
GLUCOSE SERPL-MCNC: 112 MG/DL — HIGH (ref 70–99)
HCT VFR BLD CALC: 37.6 % — SIGNIFICANT CHANGE UP (ref 34.5–45)
HGB BLD-MCNC: 12 G/DL — SIGNIFICANT CHANGE UP (ref 11.5–15.5)
MAGNESIUM SERPL-MCNC: 1.7 MG/DL — LOW (ref 1.8–2.6)
MCHC RBC-ENTMCNC: 30.5 PG — SIGNIFICANT CHANGE UP (ref 27–34)
MCHC RBC-ENTMCNC: 31.9 GM/DL — LOW (ref 32–36)
MCV RBC AUTO: 95.4 FL — SIGNIFICANT CHANGE UP (ref 80–100)
PLATELET # BLD AUTO: 263 K/UL — SIGNIFICANT CHANGE UP (ref 150–400)
POTASSIUM SERPL-MCNC: 4.1 MMOL/L — SIGNIFICANT CHANGE UP (ref 3.5–5.3)
POTASSIUM SERPL-SCNC: 4.1 MMOL/L — SIGNIFICANT CHANGE UP (ref 3.5–5.3)
RBC # BLD: 3.94 M/UL — SIGNIFICANT CHANGE UP (ref 3.8–5.2)
RBC # FLD: 14.6 % — HIGH (ref 10.3–14.5)
SODIUM SERPL-SCNC: 138 MMOL/L — SIGNIFICANT CHANGE UP (ref 135–145)
WBC # BLD: 10.46 K/UL — SIGNIFICANT CHANGE UP (ref 3.8–10.5)
WBC # FLD AUTO: 10.46 K/UL — SIGNIFICANT CHANGE UP (ref 3.8–10.5)

## 2023-02-14 PROCEDURE — C1769: CPT

## 2023-02-14 PROCEDURE — 85027 COMPLETE CBC AUTOMATED: CPT

## 2023-02-14 PROCEDURE — 83735 ASSAY OF MAGNESIUM: CPT

## 2023-02-14 PROCEDURE — 93005 ELECTROCARDIOGRAM TRACING: CPT

## 2023-02-14 PROCEDURE — C1759: CPT

## 2023-02-14 PROCEDURE — 80048 BASIC METABOLIC PNL TOTAL CA: CPT

## 2023-02-14 PROCEDURE — 36415 COLL VENOUS BLD VENIPUNCTURE: CPT

## 2023-02-14 PROCEDURE — C1766: CPT

## 2023-02-14 PROCEDURE — C1732: CPT

## 2023-02-14 PROCEDURE — C1730: CPT

## 2023-02-14 PROCEDURE — 93657 TX L/R ATRIAL FIB ADDL: CPT

## 2023-02-14 PROCEDURE — 93656 COMPRE EP EVAL ABLTJ ATR FIB: CPT

## 2023-02-14 PROCEDURE — 93010 ELECTROCARDIOGRAM REPORT: CPT

## 2023-02-14 PROCEDURE — C1894: CPT

## 2023-02-14 RX ORDER — APIXABAN 2.5 MG/1
1 TABLET, FILM COATED ORAL
Qty: 60 | Refills: 2
Start: 2023-02-14 | End: 2023-05-14

## 2023-02-14 RX ORDER — PANTOPRAZOLE SODIUM 20 MG/1
1 TABLET, DELAYED RELEASE ORAL
Qty: 70 | Refills: 0
Start: 2023-02-14

## 2023-02-14 RX ORDER — METOPROLOL TARTRATE 50 MG
1 TABLET ORAL
Qty: 0 | Refills: 0 | DISCHARGE
Start: 2023-02-14

## 2023-02-14 RX ORDER — SUCRALFATE 1 G
10 TABLET ORAL
Qty: 280 | Refills: 0
Start: 2023-02-14 | End: 2023-02-27

## 2023-02-14 RX ORDER — MAGNESIUM SULFATE 500 MG/ML
2 VIAL (ML) INJECTION ONCE
Refills: 0 | Status: COMPLETED | OUTPATIENT
Start: 2023-02-14 | End: 2023-02-14

## 2023-02-14 RX ADMIN — Medication 650 MILLIGRAM(S): at 09:26

## 2023-02-14 RX ADMIN — Medication 40 MILLIGRAM(S): at 06:15

## 2023-02-14 RX ADMIN — OXYCODONE HYDROCHLORIDE 5 MILLIGRAM(S): 5 TABLET ORAL at 04:00

## 2023-02-14 RX ADMIN — Medication 0.25 MILLIGRAM(S): at 01:26

## 2023-02-14 RX ADMIN — Medication 25 GRAM(S): at 07:21

## 2023-02-14 RX ADMIN — Medication 50 MILLIGRAM(S): at 08:12

## 2023-02-14 RX ADMIN — PANTOPRAZOLE SODIUM 40 MILLIGRAM(S): 20 TABLET, DELAYED RELEASE ORAL at 06:15

## 2023-02-14 RX ADMIN — OXYCODONE HYDROCHLORIDE 5 MILLIGRAM(S): 5 TABLET ORAL at 03:04

## 2023-02-14 RX ADMIN — Medication 650 MILLIGRAM(S): at 00:20

## 2023-02-14 RX ADMIN — Medication 5 MILLIGRAM(S): at 06:15

## 2023-02-14 RX ADMIN — Medication 1 GRAM(S): at 06:14

## 2023-02-14 RX ADMIN — LOSARTAN POTASSIUM 25 MILLIGRAM(S): 100 TABLET, FILM COATED ORAL at 06:15

## 2023-02-14 RX ADMIN — CHOLESTYRAMINE 4 GRAM(S): 4 POWDER, FOR SUSPENSION ORAL at 10:01

## 2023-02-14 RX ADMIN — AMIODARONE HYDROCHLORIDE 100 MILLIGRAM(S): 400 TABLET ORAL at 06:14

## 2023-02-14 RX ADMIN — APIXABAN 5 MILLIGRAM(S): 2.5 TABLET, FILM COATED ORAL at 09:26

## 2023-02-14 NOTE — DISCHARGE NOTE NURSING/CASE MANAGEMENT/SOCIAL WORK - NSDCPEFALRISK_GEN_ALL_CORE
For information on Fall & Injury Prevention, visit: [email or web address] OR  [email or web address] OR  https://www.cdc.gov/steadi/patient.html

## 2023-02-14 NOTE — DISCHARGE NOTE NURSING/CASE MANAGEMENT/SOCIAL WORK - PATIENT PORTAL LINK FT
You can access the FollowMyHealth Patient Portal offered by [facility] by registering at the following website: [email or web address]. By joining [employer]’s FollowMyHealth portal, you will also be able to view your health information using other applications (apps) compatible with our system.

## 2023-02-15 ENCOUNTER — NON-APPOINTMENT (OUTPATIENT)
Age: 73
End: 2023-02-15

## 2023-02-15 DIAGNOSIS — I31.9 DISEASE OF PERICARDIUM, UNSPECIFIED: ICD-10-CM

## 2023-02-17 PROBLEM — Z91.89 OTHER SPECIFIED PERSONAL RISK FACTORS, NOT ELSEWHERE CLASSIFIED: Chronic | Status: ACTIVE | Noted: 2023-02-06

## 2023-02-28 ENCOUNTER — APPOINTMENT (OUTPATIENT)
Dept: HEART FAILURE | Facility: CLINIC | Age: 73
End: 2023-02-28
Payer: MEDICARE

## 2023-02-28 VITALS
HEART RATE: 59 BPM | HEIGHT: 61 IN | SYSTOLIC BLOOD PRESSURE: 126 MMHG | DIASTOLIC BLOOD PRESSURE: 74 MMHG | WEIGHT: 273 LBS | BODY MASS INDEX: 51.54 KG/M2 | OXYGEN SATURATION: 97 %

## 2023-02-28 PROCEDURE — 99215 OFFICE O/P EST HI 40 MIN: CPT

## 2023-03-02 NOTE — HISTORY OF PRESENT ILLNESS
[FreeTextEntry1] : 71 y/o female, former smoker, with HFpEF (LVEF 50-55%), moderate aortic stenosis, atrial fibrillation s/p DCCV on 12/2022 and RFA 2/13/23, HTN, HLD, DM2, chronic PVD/prior vascular ulcers (followed by Dr. Yoo), morbid obesity/prior lap band (since removed), who presents for initial heart failure visit.  \par \par Mrs. Kirby noticed worsening exertional dyspnea and fatigue since the early fall of 2022.  She was admitted to  in October of 2022 for cellulitis, requiring IV antibiotic treatment.  She was again admitted on 11/29/22 to  for exacerbation of her HF and discharged on 12/9 but was seen by her cardiologist on 12/13 and found to have continued dyspnea and LE edema prompting her to be sent to [facility] for further evaluation of her AS related HF.  \par Her edema improved with diuresis, but continued to report significant dyspnea.  She was also found to be in afib and underwent a successful cardioversion on 12/22 and started on amiodarone.  Her AS was reviewed and found to be moderate in severity.  She had a dobutamine stress echo however, HR did not increase above 695 of MPHR with MG 33mmHG and Vmax 3.9cm at peak HR.  She was discharged to follow up with structural heart team, where she was offered enrollment in Expand II trial for moderate AS.  She initially agreed but later withdrew from the study. \par \par She was last seen PA Harpreet Bryant on 1/6/2022.  At that visit, she was started on losartan for elevated BP.  Unfortunately, Entresto was cost prohibitive.  She was found to be back in atrial fibrillation and underwent RFA on 2/13/2022.  Amiodarone was discontinued and her metoprolol was lowered to 50mg XL daily.  Her post operative course was complicated by pericarditis for which she was treated with 2 weeks of colchicine.  \par \par Today, she reports less dyspnea since her ablation however, has persistent fatigue.  She has been unable to walk as much as she had last summer largely limited by knee pain.  She has L > R lower extremity swelling.  Her home weight has ranged between 260 and 262 lbs, with home -130s/60.  She denies any syncope, LH/dizziness, chest pain, palpitations, PND, orthopnea, nausea/vomiting, abdominal pain. \par

## 2023-03-02 NOTE — PHYSICAL EXAM
[Well Developed] : well developed [Well Nourished] : well nourished [No Acute Distress] : no acute distress [Normal Conjunctiva] : normal conjunctiva [Normal S1, S2] : normal S1, S2 [Clear Lung Fields] : clear lung fields [Good Air Entry] : good air entry [No Respiratory Distress] : no respiratory distress  [Soft] : abdomen soft [Non Tender] : non-tender [Moves all extremities] : moves all extremities [No Focal Deficits] : no focal deficits [Alert and Oriented] : alert and oriented [Murmur] : murmur [de-identified] : sitting in wheelchair [de-identified] : J 8-10cm. [de-identified] : grade 3/6 systolic murmur at LUSB [de-identified] : gait not accessed [de-identified] : 1+ edema L > R, warm [de-identified] : B/L LE errythema /chronic stasis skin color change L > R.

## 2023-03-02 NOTE — CARDIOLOGY SUMMARY
[de-identified] : \par  [de-identified] : \par DSE 12/23/22: Nondiagnostic.  69% MPHR. At peak HR, MG 33mmHG and Vmax 3.9cm. Consider alternative imaging modality such as CT aortic valve calcium score to confirm severity.\par  [de-identified] : \par ASHLEY 12/21/22: LVEF 55-60%, spontaneous echocontrast in ECHO, no clot, mild MR, ADRIAN 0.8cm2 by VTI and 0.96cm by planimetry,  with DI 0.26\par 12/14/22: LVEF 50-55%, LVIDd 5.11cm, IVSd 1.2cm, mild LAE, severe AGUILA, moderate RVE with low/normal RV function, mild AI, moderate AS (MG 23, PV 3.02m/s), mild MR, moderate TR, PASP 41.4 mmHg (RAP 3 mmHg).\par \par  [de-identified] : \par RHC: RA 24, RV 62/22 PA 63/32 (46), PCWP 18, CO 6.4/ CI 2.98, PA sat 53%.PVR 2.34 \par LHC: RCA 80% heavy calcified, no intervention.  No significant gradient across AV.

## 2023-03-02 NOTE — ASSESSMENT
[FreeTextEntry1] : 71 y/o female, former smoker, with HFpEF (LVEF 50-55%), moderate aortic stenosis, atrial fibrillation s/p DCCV on 12/2022 and RFA 2/13/23, HTN, HLD, DM2, chronic PVD/prior vascular ulcers (followed by Dr. Yoo), morbid obesity/prior lap band (since removed), who presents for initial heart failure visit.  \par \par # HFpEF \par - on losartan 25 mg po daily (Entresto currently cost prohibitive, arranging patient assistance plan)\par - Continue metoprolol 50mg XL daily and spironolactone 25mg daily.\par - No longer on Jardiance in setting of recurrent UTI. \par - Diuretics: Lasix 40mg daily.   \par - She may be a candidate for a CardioMEMS device.  However, she defers this for now. \par \par # Moderate Aortic Stenosis\par  -Only moderate on TTE (ADRIAN 1.1, PPG 36.5, DI 0.36). No significant gradient LHC. ASHLEY showed ADRIAN 0.8, DI 0.26 MG not.\par - Dobutamine stress echo was suboptimal based on HR response with  MG 33, Vmax 3.9cm.\par - she should undergo repeat echocardiogram now post ablation as well as to assess AS severity now that she is in NSR.  \par - Next steps after that if AS is still not severe, would be to repeat dobutamine SE with longer beta blocker wash out (2- 3 days) or obtain CTA for calcium score of aortic valve to evaluate if AS is truly severe. \par - Patient prefers to defer testing to her primary cardiologist and will get echocardiogram with their office.\par - patient deferred enrollment into the EXPAND trial but is willing to undergo TAVR if she ever develops severe AS. \par \par # Atrial Fibrillation \par - S/p unsuccessful  DCCV on 12/22, followed by afib ablation on 2/13/22.  Now off amiodarone.  \par - On Eliquis 5mg BID.\par \par Follow up in 3 months. \par

## 2023-03-02 NOTE — REASON FOR VISIT
[Cardiac Failure] : cardiac failure [Spouse] : spouse [FreeTextEntry1] : Primary Cardiologist: Dr. Mayer ([place])\par EP : Dr. Jack Estes

## 2023-03-14 NOTE — PHYSICAL EXAM
[2+] : left 2+ [Ankle Swelling (On Exam)] : present [Ankle Swelling Bilaterally] : bilaterally  [] : bilaterally [Ankle Swelling On The Left] : moderate [Alert] : alert [Oriented to Person] : oriented to person [Oriented to Place] : oriented to place [Oriented to Time] : oriented to time [Calm] : calm [de-identified] : WD, WN, NAD. Awake, alert, interactive. Ambulates slowly with a cane for support. [FreeTextEntry1] : edema LLE>RLE\par erythema and warmth of LLE  [de-identified] : \par

## 2023-03-14 NOTE — ASSESSMENT
[FreeTextEntry1] : 71 y/o woman with LLE cellulitis. Pt has lymphedema, chronic venous stasis hyperpigmentation and dermatitis and peripheral neuropathy.  We discussed she will need a neurologic evaluation to diagnose etiology of pain.\par \par Plan:\par - Pt has cellultiis of left leg\par - Gave pt rx for cipro 500 mg \par - Continue Coumadin, ASA 81 mg and Pravastatin QD.\par - Continue Gabapentin to 800 mg TID for neuropathic pain.\par - Continue medical management of HTN.\par -  Continue conservative therapy: compression stockings 20-30 mmHg daily from awakening until bedtime, leg elevation above the level of the heart at rest and frequent ambulation.\par - Walk daily for exercise.\par - Refer to Neurology for evaluation of peripheral neuropathy.\par - RTC 2 week for evaluation of cellulitis \par \par \par

## 2023-03-15 ENCOUNTER — APPOINTMENT (OUTPATIENT)
Dept: ELECTROPHYSIOLOGY | Facility: CLINIC | Age: 73
End: 2023-03-15
Payer: MEDICARE

## 2023-03-15 ENCOUNTER — NON-APPOINTMENT (OUTPATIENT)
Age: 73
End: 2023-03-15

## 2023-03-15 VITALS
SYSTOLIC BLOOD PRESSURE: 128 MMHG | DIASTOLIC BLOOD PRESSURE: 64 MMHG | WEIGHT: 267 LBS | TEMPERATURE: 98.6 F | BODY MASS INDEX: 50.41 KG/M2 | HEART RATE: 57 BPM | OXYGEN SATURATION: 95 % | HEIGHT: 61 IN

## 2023-03-15 PROCEDURE — 99214 OFFICE O/P EST MOD 30 MIN: CPT

## 2023-03-15 PROCEDURE — 93000 ELECTROCARDIOGRAM COMPLETE: CPT

## 2023-03-15 RX ORDER — COLCHICINE 0.6 MG/1
0.6 TABLET ORAL
Qty: 8 | Refills: 0 | Status: DISCONTINUED | COMMUNITY
Start: 2023-02-15 | End: 2023-03-15

## 2023-03-15 RX ORDER — METOPROLOL SUCCINATE 50 MG/1
50 TABLET, EXTENDED RELEASE ORAL DAILY
Qty: 30 | Refills: 0 | Status: DISCONTINUED | COMMUNITY
End: 2023-03-15

## 2023-03-18 NOTE — PROGRESS NOTE ADULT - ASSESSMENT
69 y/o female admitted with fevers/SOB noting to have RUQ PNA with multiple medical issues.   Now with complaints of dysphagia after being on doxycyline for the past several days.   UC-concerns she is not on budesonide at present time, but prefers Uceris as symptoms are better controlled.  We do not have Uceris in hospital, will start budesonide now-safe to administrate during an infectious state.      Impression:  Likely pill esophagitis 2/2 to recent doxycyline use, with pna/reflux playing a role-doubt esophageal stricture-clinically improving per patient.    Plan:  Tolerating diet without any difficulty.   IV ABX per ID.   Avoid doxycyline as this is likely playing a role.   Continue PPI and carafate.   No plans for inpatient esophagram/EGD at this time.   Pt to f/u as outpatient in office-will no longer follow, please call if needed.     Discussed with patient, Dr. Marie. English

## 2023-03-21 ENCOUNTER — NON-APPOINTMENT (OUTPATIENT)
Age: 73
End: 2023-03-21

## 2023-03-24 ENCOUNTER — APPOINTMENT (OUTPATIENT)
Dept: VASCULAR SURGERY | Facility: CLINIC | Age: 73
End: 2023-03-24

## 2023-03-24 ENCOUNTER — APPOINTMENT (OUTPATIENT)
Dept: VASCULAR SURGERY | Facility: CLINIC | Age: 73
End: 2023-03-24
Payer: MEDICARE

## 2023-03-24 VITALS
BODY MASS INDEX: 50.41 KG/M2 | RESPIRATION RATE: 18 BRPM | HEIGHT: 61 IN | HEART RATE: 58 BPM | OXYGEN SATURATION: 96 % | DIASTOLIC BLOOD PRESSURE: 67 MMHG | WEIGHT: 267 LBS | SYSTOLIC BLOOD PRESSURE: 147 MMHG

## 2023-03-24 DIAGNOSIS — I83.002 VARICOSE VEINS OF UNSPECIFIED LOWER EXTREMITY WITH ULCER OF CALF: ICD-10-CM

## 2023-03-24 DIAGNOSIS — L97.209 VARICOSE VEINS OF UNSPECIFIED LOWER EXTREMITY WITH ULCER OF CALF: ICD-10-CM

## 2023-03-24 PROCEDURE — 99213 OFFICE O/P EST LOW 20 MIN: CPT

## 2023-03-24 NOTE — HISTORY OF PRESENT ILLNESS
[FreeTextEntry1] : 8/6/21: 72 y/o female returns the office for follow-up. PMH:  DM2, HTN and A Fib. venous insufficiency, lymphedema and recurrent ulcers  She reports increased pain to her feet even with use of gabapentin 3 times a day.  In the past she states prednisone has helped but she never has had drastic improvement of symptoms.  She was advised to seek neurologic evaluation for symptoms but has not as of yet.  There continues to be improvement in edema to her legs with use of conservative treatment: Compression stockings 20 to 30 mmHg daily and lymphedema pumps.  She walks daily for exercise, elevates her legs at rest and ambulates frequently.  Pain is described as burning and tingling pain to her legs all day. She has been seeing a pain management specialist, but has not had relief of pain.  She takes ASA 81 mg, Coumadin and Pravastatin 10 mg QD. No current pain reported. No fever or chills. She is a non-smoker. \par \par 3/9/22: Pt still has leg pain since last visit. She feels the legs felt much better on prednisone. She has pain in her medial ankles bilaterally. She has been compliant with compression stockings. She does not use the pump because it feels it is too restrictive. She takes ASA 81 mg, Coumadin and Pravastatin 10 mg QD. Pt is still taking Gabapentin 800 mg. She does not take cymbalta. \par \par 6/24/22: Pt still has BLE leg pain. She feels the pain is improved since taking prednisone. She takes ASA 81 mg, Coumadin and Pravastatin 10 mg QD. Pt is still taking Gabapentin 800 mg. She does not take cymbalta. \par \par 7/8/22: Pt has increased LLE edema, erythema and pain.  She denies any recent fevers. She has been compliant with compression stockings. She takes ASA 81 mg, Coumadin and Pravastatin 10 mg QD. Pt is still taking Gabapentin 800 mg. She does not take cymbalta. \par \par 9/23/22: Pt has increased LLE edema, erythema and pain.  She denies any recent fevers. She has been compliant with compression stockings. She takes ASA 81 mg, Coumadin and Pravastatin 10 mg QD. Pt is still taking Gabapentin 800 mg. She does not take cymbalta. \par \par 10/7/22: Pt has new right ankle wound. She also has increased LE edema, erythema and pain.  She denies any recent fevers. She has been compliant with compression stockings. She takes ASA 81 mg, Coumadin and Pravastatin 10 mg QD. Pt is still taking Gabapentin 800 mg. She does not take cymbalta. \par \par 11/4/22: Pts right ankle wound is getting larger. She has increased LE edema, erythema and pain.  She denies any recent fevers. She has been compliant with compression stockings. She takes ASA 81 mg, Coumadin and Pravastatin 10 mg QD. Pt is still taking Gabapentin 800 mg. She does not take cymbalta. \par \par 11/11/22: Pts right ankle wound is getting larger. She has increased LE edema, erythema and pain.  She denies any recent fevers. She has been compliant with compression stockings. She takes ASA 81 mg, Coumadin and Pravastatin 10 mg QD. Pt is still taking Gabapentin 800 mg. She does not take cymbalta. \par \par 3/24/23: Pt doing okay since last visit. She still has LE edema, erythema and pain.  She denies any recent fevers. She has been compliant with compression stockings. She takes ASA 81 mg, Coumadin and Pravastatin 10 mg QD. Pt is still taking Gabapentin 800 mg. She does not take cymbalta.

## 2023-03-24 NOTE — ASSESSMENT
[FreeTextEntry1] : 71 y/o woman with healed right leg ulcer and left leg swelling and pain . Pt has lymphedema, chronic venous stasis hyperpigmentation and dermatitis and peripheral neuropathy.  We discussed she will need a neurologic evaluation to diagnose etiology of pain.\par \par - Pt counseled to use compression stockings \par - Continue Coumadin, ASA 81 mg and Pravastatin QD.\par - Continue Gabapentin to 800 mg TID for neuropathic pain.\par - Continue medical management of HTN.\par -  Continue conservative therapy: compression stockings 20-30 mmHg daily from awakening until bedtime, leg elevation above the level of the heart at rest and frequent ambulation.\par - Walk daily for exercise.\par - Refer to Neurology for evaluation of peripheral neuropathy.\par - RTC in 3 months \par \par A total of 20 minutes was spent with patient and coordinating care\par \par

## 2023-03-24 NOTE — PHYSICAL EXAM
[2+] : left 2+ [Ankle Swelling (On Exam)] : present [Ankle Swelling Bilaterally] : bilaterally  [] : bilaterally [Ankle Swelling On The Left] : moderate [Alert] : alert [Oriented to Person] : oriented to person [Oriented to Place] : oriented to place [Oriented to Time] : oriented to time [Calm] : calm [de-identified] : WD, WN, NAD. Awake, alert, interactive. Ambulates slowly with a cane for support. [FreeTextEntry1] : right leg ulcers healed  [de-identified] : \par

## 2023-03-29 NOTE — ED ADULT NURSE REASSESSMENT NOTE - CONDITION
Dry cough x 4 days, denies fever. Had Covid Pneumonia. w multiple P.E's  & hospitalized last year.
unchanged
improved

## 2023-04-11 NOTE — REVIEW OF SYSTEMS
[Dyspnea on exertion] : dyspnea during exertion [Palpitations] : palpitations [Headache] : no headache [Feeling Fatigued] : not feeling fatigued [SOB] : no shortness of breath [Chest Discomfort] : no chest discomfort [Lower Ext Edema] : no extremity edema [Orthopnea] : no orthopnea [Syncope] : no syncope [Easy Bleeding] : no tendency for easy bleeding

## 2023-04-11 NOTE — DISCUSSION/SUMMARY
[EKG obtained to assist in diagnosis and management of assessed problem(s)] : EKG obtained to assist in diagnosis and management of assessed problem(s) [FreeTextEntry1] : The patient is a 72 year old female with a history of HFpEF (LVEF 50-55%), moderate aortic stenosis,\par persistent atrial fibrillation (s/p DCCV), HTN, hyperlipidemia, DM type 2, chronic PVD/prior vascular ulcers and\par morbid obesity/prior lap band (since removed). She recently had worsening JETT with light exertion and lower\par extremity edema. Outpatient TTE showed worsening aortic stenosis and she was referred to [facility] by \pérez Mayer for further evaluation and consideration for TAVR. Repeat echo demonstrated moderate AS, LHC on\par 12/20 showed no significant aortic valve gradients, some CAD noted. Symptoms were out of proportion to\par severity of AS. LHC was consistent with only moderate AS. She has been in persistent AF for the last 3\par years with prior failed cardioversions. She underwent successful DCCV 12/22/22 and was started on\par amiodarone prior to discharge. She noted significant symptomatic improvement while in sinus rhythm. She\par presents for elective ablation of atrial fibrillation and underwent PVI, CTI line on 2/13/23. Amiodarone was decreased to 100mg daily on discharge. \par \par Today, reports she has been doing well, notes brief palpitations only. No sustained symptoms of arrhythmia. Experiencing headaches unsure if correlates with when amiodarone initiated. ECG reveals SB. \par \par Maintained on Eliquis. Denies easy bruising, bleeding, or falls. \par \par Recommendation: \par \par Ms. Kirby is doing well s/p ablation for persistent AF 2/13/23 maintaining SR on low dose amiodarone 100mg daily. She will continue for two more weeks then discontinue amiodarone on 4/1/23. Continue Eliquis for stroke prophylaxis. WIll then arrange 1 week MCOT 3 months post ablation with EP follow up in office once resulted. Echo pending for later this month with cardiologist to reevaluate AS.\par \par Sara ALVAREZ-C

## 2023-04-11 NOTE — HISTORY OF PRESENT ILLNESS
[FreeTextEntry1] : The patient is a 72 year old female with a history of HFpEF (LVEF 50-55%), moderate aortic stenosis,\par persistent atrial fibrillation (s/p DCCV), HTN, hyperlipidemia, DM type 2, chronic PVD/prior vascular ulcers and\par morbid obesity/prior lap band (since removed). She recently had worsening JETT with light exertion and lower\par extremity edema. Outpatient TTE showed worsening aortic stenosis and she was referred to [facility] by \pérez Mayer for further evaluation and consideration for TAVR. Repeat echo demonstrated moderate AS, LHC on\par 12/20 showed no significant aortic valve gradients, some CAD noted. Symptoms were out of proportion to\par severity of AS. LHC was consistent with only moderate AS. She has been in persistent AF for the last 3\par years with prior failed cardioversions. She underwent successful DCCV 12/22/22 and was started on\par amiodarone prior to discharge. She noted significant symptomatic improvement while in sinus rhythm. She\par presents for elective ablation of atrial fibrillation and underwent PVI, CTI line on 2/13/23. Amiodarone was decreased to 100mg daily on discharge. \par \par Today, reports she has been doing well, notes brief palpitations only. No sustained symptoms of arrhythmia. Experiencing headaches unsure if correlates with when amiodarone initiated. ECG reveals SB. \par \par Maintained on Eliquis. Denies easy bruising, bleeding, or falls.

## 2023-04-12 NOTE — ASU PATIENT PROFILE, ADULT - NS PRO MODE OF ARRIVAL
Chief Complaint   Patient presents with    UTI     X 1 week       HISTORY OF PRESENT ILLNESS: Patient is a pleasant 24 y.o. female who presents today due to one week of urinary burning, urgency, and frequency. Reports some associated low back pressure with one episode of vomiting. Denies hematuria, fever, flank pain. Denies a history of pyelonephritis or kidney stones. Admits to a history of UTIs, last one was years ago.     Patient Active Problem List    Diagnosis Date Noted    Menorrhagia with irregular cycle 02/09/2023       Allergies:Patient has no known allergies.    Current Outpatient Medications Ordered in Epic   Medication Sig Dispense Refill    nitrofurantoin (MACROBID) 100 MG Cap Take 1 Capsule by mouth 2 times a day for 5 days. 10 Capsule 0     No current Epic-ordered facility-administered medications on file.       History reviewed. No pertinent past medical history.    Social History     Tobacco Use    Smoking status: Never    Smokeless tobacco: Never   Vaping Use    Vaping Use: Never used   Substance Use Topics    Alcohol use: No    Drug use: No       No family status information on file.   History reviewed. No pertinent family history.    ROS:  Review of Systems   Constitutional: Negative for fever, chills, weight loss and malaise/fatigue.   HENT: Negative for ear pain, nosebleeds, congestion, sore throat and neck pain.    Eyes: Negative for vision changes.   Neuro: Negative for headache, sensory changes, weakness, seizure, LOC.   Cardiovascular: Negative for chest pain, palpitations, orthopnea and leg swelling.   Respiratory: Negative for cough, sputum production, shortness of breath and wheezing.   Gastrointestinal: Positive for lower abdominal pressure, vomiting x1. Negative for abdominal pain, diarrhea.   Genitourinary: Positive for dysuria, urgency and frequency. Negative for hematuria or flank pain.   Skin: Negative for rash, diaphoresis.     Exam:  /80   Pulse 94   Temp 36.9 °C (98.5  "°F) (Temporal)   Resp 14   Ht 1.626 m (5' 4\")   Wt 90.7 kg (200 lb)   SpO2 96%   General: well-nourished, well-developed female in NAD  Head: normocephalic, atraumatic  Eyes: PERRLA, no conjunctival injection, acuity grossly intact, lids normal.  Lymph: no cervical adenopathy. No supraclavicular adenopathy.   Neuro: alert and oriented. Cranial nerves 1-12 grossly intact. No sensory deficit.   Cardiovascular: regular rate and rhythm. No edema.  Pulmonary: no distress. Chest is symmetrical with respiration, no wheezes, crackles, or rhonchi.   Abdomen: soft, non-tender, no guarding, no hepatosplenomegaly. No CVA tenderness.   Musculoskeletal: no clubbing, appropriate muscle tone, gait is stable.  Skin: warm, dry, intact, no clubbing, no cyanosis, no rashes.   Psych: appropriate mood, affect, judgement.       POC pregnancy negative    POC urine positive for blood, protein, leukocytes      Assessment/Plan:  1. Acute urinary tract infection  POCT Pregnancy    POCT Urinalysis    nitrofurantoin (MACROBID) 100 MG Cap    URINE CULTURE(NEW)            Antibiotic as directed. Increase fluid intake. Urine sent for culture.   Supportive care, differential diagnoses, and indications for immediate follow-up discussed with patient.   Pathogenesis of diagnosis discussed including typical length and natural progression.   Instructed to return to clinic or nearest emergency department for any change in condition, further concerns, or worsening of symptoms.  Patient states understanding of the plan of care and discharge instructions.  Instructed to make an appointment, for follow up, with their primary care provider.        Please note that this dictation was created using voice recognition software. I have made every reasonable attempt to correct obvious errors, but I expect that there are errors of grammar and possibly content that I did not discover before finalizing the note.      NARINDER Powell.     " stretcher

## 2023-04-14 ENCOUNTER — RX RENEWAL (OUTPATIENT)
Age: 73
End: 2023-04-14

## 2023-05-01 NOTE — ED PROVIDER NOTE - OBJECTIVE STATEMENT
69 yo F hx of  Afib on coumadin, CHF, DM, HTN, peripheral venous insufficiency and cellulitis p/w worsening of left leg pain, drainage, and swelling. No fever at home. She called her vascular surgeon  and recommend to come the ED.
0

## 2023-05-16 ENCOUNTER — NON-APPOINTMENT (OUTPATIENT)
Age: 73
End: 2023-05-16

## 2023-05-16 ENCOUNTER — APPOINTMENT (OUTPATIENT)
Dept: HEART FAILURE | Facility: CLINIC | Age: 73
End: 2023-05-16
Payer: MEDICARE

## 2023-05-16 VITALS
WEIGHT: 263 LBS | DIASTOLIC BLOOD PRESSURE: 77 MMHG | BODY MASS INDEX: 49.65 KG/M2 | OXYGEN SATURATION: 98 % | HEIGHT: 61 IN | HEART RATE: 48 BPM | SYSTOLIC BLOOD PRESSURE: 135 MMHG

## 2023-05-16 PROCEDURE — 99214 OFFICE O/P EST MOD 30 MIN: CPT

## 2023-05-16 PROCEDURE — 93000 ELECTROCARDIOGRAM COMPLETE: CPT

## 2023-05-16 RX ORDER — AMIODARONE HYDROCHLORIDE 100 MG/1
100 TABLET ORAL
Qty: 90 | Refills: 1 | Status: DISCONTINUED | COMMUNITY
End: 2023-05-16

## 2023-05-16 RX ORDER — EZETIMIBE 10 MG/1
10 TABLET ORAL DAILY
Refills: 0 | Status: ACTIVE | COMMUNITY

## 2023-05-16 RX ORDER — ZOLPIDEM TARTRATE 10 MG/1
10 TABLET, FILM COATED ORAL
Refills: 0 | Status: ACTIVE | COMMUNITY

## 2023-05-16 NOTE — REASON FOR VISIT
[Cardiac Failure] : cardiac failure [Spouse] : spouse [FreeTextEntry1] : Primary Cardiologist: Dr. Mayer ([place])\par EP : Dr. Jack Estes

## 2023-05-16 NOTE — HISTORY OF PRESENT ILLNESS
[FreeTextEntry1] : 73 y/o female, former smoker, with HFpEF (LVEF 50-55%), moderate aortic stenosis, atrial fibrillation s/p DCCV on 12/2022 and RFA 2/13/23, HTN, HLD, DM2, chronic PVD/prior vascular ulcers (followed by Dr. Yoo), morbid obesity/prior lap band (since removed), who presents for HF follow up.  \par \par Mrs. Kirby noticed worsening exertional dyspnea and fatigue since the early fall of 2022.  She was admitted to  in October of 2022 for cellulitis, requiring IV antibiotic treatment.  She was again admitted on 11/29/22 to  for exacerbation of her HF and discharged on 12/9 but was seen by her cardiologist on 12/13 and found to have continued dyspnea and LE edema prompting her to be sent to [facility] for further evaluation of her AS related HF.  \par Her edema improved with diuresis, but continued to report significant dyspnea.  She was also found to be in afib and underwent a successful cardioversion on 12/22 and started on amiodarone.  Her AS was reviewed and found to be moderate in severity.  She had a dobutamine stress echo however, HR did not increase above 695 of MPHR with MG 33mmHG and Vmax 3.9cm at peak HR.  She was discharged to follow up with structural heart team, where she was offered enrollment in Expand II trial for moderate AS.  She initially agreed but later withdrew from the study. \par \par She was last seen by me on 2/28/23.  At that visit we discussed re-evealuation for her arotic stenosis.  She has since seen EP and was taken off of amiodarone on April 1st.  She also just completed a 3 month MCOT (pending result).  She did a repeat echo with her cardiologist with persistent moderate AS.  \par \par Today, she reports feeling well.  Her dyspnea has much improved post abaltation.  She has been unable to walk as much as she had last summer largely limited by knee pain.  She has minimal L > R lower extremity swelling.  Her home weight has ranged between 263-265 lbs, with home -130s/60.  She denies any syncope, LH/dizziness, chest pain, palpitations, PND, orthopnea, nausea/vomiting, abdominal pain.  She is taking lasix 40mg daily however, will skip on days that she is going out.\par

## 2023-05-16 NOTE — PHYSICAL EXAM
[Well Developed] : well developed [Well Nourished] : well nourished [No Acute Distress] : no acute distress [Normal Conjunctiva] : normal conjunctiva [Normal S1, S2] : normal S1, S2 [Murmur] : murmur [Clear Lung Fields] : clear lung fields [Good Air Entry] : good air entry [No Respiratory Distress] : no respiratory distress  [Soft] : abdomen soft [Non Tender] : non-tender [Moves all extremities] : moves all extremities [No Focal Deficits] : no focal deficits [Alert and Oriented] : alert and oriented [de-identified] : sitting in wheelchair [de-identified] : J 8-10cm. [de-identified] : grade 3/6 systolic murmur at LUSB [de-identified] : gait not accessed [de-identified] : trace edema L > R, warm [de-identified] : B/L LE erythrema /chronic stasis skin color change L > R.

## 2023-05-16 NOTE — CARDIOLOGY SUMMARY
[de-identified] : \par 5/16/23: NSR likley with no heart block, HR 48, poor R wave progression.\par  [de-identified] : \par DSE 12/23/22: Nondiagnostic.  69% MPHR. At peak HR, MG 33mmHG and Vmax 3.9cm. Consider alternative imaging modality such as CT aortic valve calcium score to confirm severity.\par  [de-identified] : \par 3/23/23: LVEF 55-60%, LVEDD 5.7cm, IVSd 1.4cm, PWd 1.0cm, AV MG 23, V max 3.10, ADRIAN 0.9cm by VTI, moderate AS\par ASHLEY 12/21/22: LVEF 55-60%, spontaneous echo contrast in ECHO, no clot, mild MR, ADRIAN 0.8cm2 by VTI and 0.96cm by planimetry,  with DI 0.26\par 12/14/22: LVEF 50-55%, LVIDd 5.11cm, IVSd 1.2cm, mild LAE, severe AGUILA, moderate RVE with low/normal RV function, mild AI, moderate AS (MG 23, PV 3.02m/s), mild MR, moderate TR, PASP 41.4 mmHg (RAP 3 mmHg).\par \par  [de-identified] : \par RHC: RA 24, RV 62/22 PA 63/32 (46), PCWP 18, CO 6.4/ CI 2.98, PA sat 53%.PVR 2.34 \par LHC: RCA 80% heavy calcified, no intervention.  No significant gradient across AV.

## 2023-05-21 ENCOUNTER — EMERGENCY (EMERGENCY)
Facility: HOSPITAL | Age: 73
LOS: 0 days | Discharge: HOME | End: 2023-05-21
Attending: EMERGENCY MEDICINE
Payer: MEDICARE

## 2023-05-21 VITALS
HEART RATE: 48 BPM | RESPIRATION RATE: 18 BRPM | TEMPERATURE: 98 F | SYSTOLIC BLOOD PRESSURE: 145 MMHG | DIASTOLIC BLOOD PRESSURE: 50 MMHG | OXYGEN SATURATION: 100 %

## 2023-05-21 VITALS — WEIGHT: 259.93 LBS | HEIGHT: 70 IN

## 2023-05-21 DIAGNOSIS — Z90.49 ACQUIRED ABSENCE OF OTHER SPECIFIED PARTS OF DIGESTIVE TRACT: ICD-10-CM

## 2023-05-21 DIAGNOSIS — I50.9 HEART FAILURE, UNSPECIFIED: ICD-10-CM

## 2023-05-21 DIAGNOSIS — E11.9 TYPE 2 DIABETES MELLITUS WITHOUT COMPLICATIONS: ICD-10-CM

## 2023-05-21 DIAGNOSIS — Z98.890 OTHER SPECIFIED POSTPROCEDURAL STATES: Chronic | ICD-10-CM

## 2023-05-21 DIAGNOSIS — Z91.041 RADIOGRAPHIC DYE ALLERGY STATUS: ICD-10-CM

## 2023-05-21 DIAGNOSIS — Z79.01 LONG TERM (CURRENT) USE OF ANTICOAGULANTS: ICD-10-CM

## 2023-05-21 DIAGNOSIS — Z91.040 LATEX ALLERGY STATUS: ICD-10-CM

## 2023-05-21 DIAGNOSIS — Z79.84 LONG TERM (CURRENT) USE OF ORAL HYPOGLYCEMIC DRUGS: ICD-10-CM

## 2023-05-21 DIAGNOSIS — I11.0 HYPERTENSIVE HEART DISEASE WITH HEART FAILURE: ICD-10-CM

## 2023-05-21 DIAGNOSIS — L03.032 CELLULITIS OF LEFT TOE: ICD-10-CM

## 2023-05-21 DIAGNOSIS — K95.09 OTHER COMPLICATIONS OF GASTRIC BAND PROCEDURE: Chronic | ICD-10-CM

## 2023-05-21 DIAGNOSIS — Z98.84 BARIATRIC SURGERY STATUS: ICD-10-CM

## 2023-05-21 DIAGNOSIS — Z88.0 ALLERGY STATUS TO PENICILLIN: ICD-10-CM

## 2023-05-21 DIAGNOSIS — Z90.49 ACQUIRED ABSENCE OF OTHER SPECIFIED PARTS OF DIGESTIVE TRACT: Chronic | ICD-10-CM

## 2023-05-21 DIAGNOSIS — I48.91 UNSPECIFIED ATRIAL FIBRILLATION: ICD-10-CM

## 2023-05-21 DIAGNOSIS — Z98.49 CATARACT EXTRACTION STATUS, UNSPECIFIED EYE: Chronic | ICD-10-CM

## 2023-05-21 DIAGNOSIS — Z86.39 PERSONAL HISTORY OF OTHER ENDOCRINE, NUTRITIONAL AND METABOLIC DISEASE: ICD-10-CM

## 2023-05-21 LAB
ALBUMIN SERPL ELPH-MCNC: 3.4 G/DL — SIGNIFICANT CHANGE UP (ref 3.3–5)
ALP SERPL-CCNC: 61 U/L — SIGNIFICANT CHANGE UP (ref 40–120)
ALT FLD-CCNC: 22 U/L — SIGNIFICANT CHANGE UP (ref 12–78)
ANION GAP SERPL CALC-SCNC: 4 MMOL/L — LOW (ref 5–17)
APTT BLD: 33.9 SEC — SIGNIFICANT CHANGE UP (ref 27.5–35.5)
AST SERPL-CCNC: 28 U/L — SIGNIFICANT CHANGE UP (ref 15–37)
BASOPHILS # BLD AUTO: 0.05 K/UL — SIGNIFICANT CHANGE UP (ref 0–0.2)
BASOPHILS NFR BLD AUTO: 0.5 % — SIGNIFICANT CHANGE UP (ref 0–2)
BILIRUB SERPL-MCNC: 0.7 MG/DL — SIGNIFICANT CHANGE UP (ref 0.2–1.2)
BUN SERPL-MCNC: 18 MG/DL — SIGNIFICANT CHANGE UP (ref 7–23)
CALCIUM SERPL-MCNC: 9.4 MG/DL — SIGNIFICANT CHANGE UP (ref 8.5–10.1)
CHLORIDE SERPL-SCNC: 106 MMOL/L — SIGNIFICANT CHANGE UP (ref 96–108)
CO2 SERPL-SCNC: 28 MMOL/L — SIGNIFICANT CHANGE UP (ref 22–31)
CREAT SERPL-MCNC: 0.67 MG/DL — SIGNIFICANT CHANGE UP (ref 0.5–1.3)
EGFR: 92 ML/MIN/1.73M2 — SIGNIFICANT CHANGE UP
EOSINOPHIL # BLD AUTO: 0.1 K/UL — SIGNIFICANT CHANGE UP (ref 0–0.5)
EOSINOPHIL NFR BLD AUTO: 0.9 % — SIGNIFICANT CHANGE UP (ref 0–6)
GLUCOSE SERPL-MCNC: 94 MG/DL — SIGNIFICANT CHANGE UP (ref 70–99)
HCT VFR BLD CALC: 43.4 % — SIGNIFICANT CHANGE UP (ref 34.5–45)
HGB BLD-MCNC: 13.7 G/DL — SIGNIFICANT CHANGE UP (ref 11.5–15.5)
IMM GRANULOCYTES NFR BLD AUTO: 0.3 % — SIGNIFICANT CHANGE UP (ref 0–0.9)
INR BLD: 1.26 RATIO — HIGH (ref 0.88–1.16)
LACTATE SERPL-SCNC: 1.8 MMOL/L — SIGNIFICANT CHANGE UP (ref 0.7–2)
LYMPHOCYTES # BLD AUTO: 0.89 K/UL — LOW (ref 1–3.3)
LYMPHOCYTES # BLD AUTO: 8.1 % — LOW (ref 13–44)
MCHC RBC-ENTMCNC: 30.9 PG — SIGNIFICANT CHANGE UP (ref 27–34)
MCHC RBC-ENTMCNC: 31.6 GM/DL — LOW (ref 32–36)
MCV RBC AUTO: 98 FL — SIGNIFICANT CHANGE UP (ref 80–100)
MONOCYTES # BLD AUTO: 0.49 K/UL — SIGNIFICANT CHANGE UP (ref 0–0.9)
MONOCYTES NFR BLD AUTO: 4.5 % — SIGNIFICANT CHANGE UP (ref 2–14)
NEUTROPHILS # BLD AUTO: 9.43 K/UL — HIGH (ref 1.8–7.4)
NEUTROPHILS NFR BLD AUTO: 85.7 % — HIGH (ref 43–77)
PLATELET # BLD AUTO: 295 K/UL — SIGNIFICANT CHANGE UP (ref 150–400)
POTASSIUM SERPL-MCNC: 5 MMOL/L — SIGNIFICANT CHANGE UP (ref 3.5–5.3)
POTASSIUM SERPL-SCNC: 5 MMOL/L — SIGNIFICANT CHANGE UP (ref 3.5–5.3)
PROT SERPL-MCNC: 8.1 GM/DL — SIGNIFICANT CHANGE UP (ref 6–8.3)
PROTHROM AB SERPL-ACNC: 14.7 SEC — HIGH (ref 10.5–13.4)
RBC # BLD: 4.43 M/UL — SIGNIFICANT CHANGE UP (ref 3.8–5.2)
RBC # FLD: 13.7 % — SIGNIFICANT CHANGE UP (ref 10.3–14.5)
SODIUM SERPL-SCNC: 138 MMOL/L — SIGNIFICANT CHANGE UP (ref 135–145)
WBC # BLD: 10.99 K/UL — HIGH (ref 3.8–10.5)
WBC # FLD AUTO: 10.99 K/UL — HIGH (ref 3.8–10.5)

## 2023-05-21 PROCEDURE — 93010 ELECTROCARDIOGRAM REPORT: CPT

## 2023-05-21 PROCEDURE — G1004: CPT

## 2023-05-21 PROCEDURE — 93005 ELECTROCARDIOGRAM TRACING: CPT

## 2023-05-21 PROCEDURE — 71045 X-RAY EXAM CHEST 1 VIEW: CPT | Mod: 26

## 2023-05-21 PROCEDURE — 85610 PROTHROMBIN TIME: CPT

## 2023-05-21 PROCEDURE — 87040 BLOOD CULTURE FOR BACTERIA: CPT

## 2023-05-21 PROCEDURE — 73630 X-RAY EXAM OF FOOT: CPT | Mod: LT

## 2023-05-21 PROCEDURE — 85025 COMPLETE CBC W/AUTO DIFF WBC: CPT

## 2023-05-21 PROCEDURE — 96374 THER/PROPH/DIAG INJ IV PUSH: CPT | Mod: XU

## 2023-05-21 PROCEDURE — 85730 THROMBOPLASTIN TIME PARTIAL: CPT

## 2023-05-21 PROCEDURE — 73630 X-RAY EXAM OF FOOT: CPT | Mod: 26,LT

## 2023-05-21 PROCEDURE — 80053 COMPREHEN METABOLIC PANEL: CPT

## 2023-05-21 PROCEDURE — 73701 CT LOWER EXTREMITY W/DYE: CPT | Mod: ME,LT

## 2023-05-21 PROCEDURE — 99285 EMERGENCY DEPT VISIT HI MDM: CPT | Mod: FS

## 2023-05-21 PROCEDURE — 73701 CT LOWER EXTREMITY W/DYE: CPT | Mod: 26,LT,ME

## 2023-05-21 PROCEDURE — 99285 EMERGENCY DEPT VISIT HI MDM: CPT | Mod: 25

## 2023-05-21 PROCEDURE — 83605 ASSAY OF LACTIC ACID: CPT

## 2023-05-21 PROCEDURE — 71045 X-RAY EXAM CHEST 1 VIEW: CPT

## 2023-05-21 PROCEDURE — 36415 COLL VENOUS BLD VENIPUNCTURE: CPT

## 2023-05-21 RX ORDER — CEFTRIAXONE 500 MG/1
1000 INJECTION, POWDER, FOR SOLUTION INTRAMUSCULAR; INTRAVENOUS ONCE
Refills: 0 | Status: COMPLETED | OUTPATIENT
Start: 2023-05-21 | End: 2023-05-21

## 2023-05-21 RX ORDER — CEFTRIAXONE 500 MG/1
1000 INJECTION, POWDER, FOR SOLUTION INTRAMUSCULAR; INTRAVENOUS ONCE
Refills: 0 | Status: DISCONTINUED | OUTPATIENT
Start: 2023-05-21 | End: 2023-05-21

## 2023-05-21 RX ORDER — CEPHALEXIN 500 MG
500 CAPSULE ORAL ONCE
Refills: 0 | Status: COMPLETED | OUTPATIENT
Start: 2023-05-21 | End: 2023-05-21

## 2023-05-21 RX ORDER — CEPHALEXIN 500 MG
1 CAPSULE ORAL
Qty: 28 | Refills: 0
Start: 2023-05-21 | End: 2023-05-27

## 2023-05-21 RX ADMIN — Medication 500 MILLIGRAM(S): at 15:46

## 2023-05-21 RX ADMIN — CEFTRIAXONE 1000 MILLIGRAM(S): 500 INJECTION, POWDER, FOR SOLUTION INTRAMUSCULAR; INTRAVENOUS at 13:48

## 2023-05-21 RX ADMIN — Medication 1 TABLET(S): at 15:45

## 2023-05-21 NOTE — ED ADULT NURSE NOTE - OBJECTIVE STATEMENT
pt presents to Ed with complaints of being sent from urgent care for evaluation of a laceration to left second toe.  pt endorses cutting toe a few days ago and being seen at urgent care. pt received a few stitches at that time. pt returned today to follow up and was sent to ED for further eval. 18 g placed to right FA. pt butterflied to right hand. labs sent. podiatry resident at bedside for eval.

## 2023-05-21 NOTE — ED STATDOCS - CLINICAL SUMMARY MEDICAL DECISION MAKING FREE TEXT BOX
pt with laceration to toe 2 days ago, now with redness to toe. pt has a long hx of cellulitis and ulcers, doesn't appear septic. concern for possible infection. Will start with lab work, cx, and re-evaluate.

## 2023-05-21 NOTE — ED STATDOCS - PATIENT PORTAL LINK FT
You can access the FollowMyHealth Patient Portal offered by [facility] by registering at the following website: [email or web address]. By joining [employer]’s FollowMyHealth portal, you will also be able to view your health information using other applications (apps) compatible with our system.

## 2023-05-21 NOTE — ED ADULT NURSE NOTE - NSFALLHARMRISKINTERV_ED_ALL_ED

## 2023-05-21 NOTE — ED STATDOCS - WR ORDER NAME 1
Inpatient Rehabilitation - Speech Language Pathology   Swallow Treatment Note  Joey   DYSPHAGIA THERAPY TREATMENT NOTE     Patient Name: Zaid Galarza  : 1952  MRN: [id number]  Today's Date: 2022             Admit Date: 2022    Zaid Galarza was seen this pm in speech therapy office for formal dysphagia tx. He is s/p most recent MBS on 22 w/ recommendations for nectar thick liquids w/ no straws.     He is concerned this date related to living arrangements upon discharge. He states that his home is in poor shape and that he will not be able to return there. He reports disagreements w/ his siblings in relation to this.  is aware.       Long Term Goal:  Pt will accept least restrictive diet tolerance w/o overt s/s aspiration.      Short Term Goals:     1. Pt will perform resistive breathing exercises x3 sets x5 reps w/resistance of 1-6 to improve respiratory support and control.   *Resistance at 4/4: x3 sets x15 reps. He is slightly out of breath following each set.      4. Pt will perform laryngeal adduction/elevation exercises x3 sets x10 reps w/ min cues.   *x4 sets x5 reps w/ avg duration of phonation at 11.45 seconds.      5. Pt will perform CTAR/Shaker exercises sustained x3 sets x5 reps for 30+ seconds over 3 consecutive sessions.   *CTAR: x1 set x3 reps sustained for 30 sec.      6. Pt will perform CTAR/Shaker exercises repetitive x3 sets x12 reps w/ mod cues.   *CTAR: x2 sets x15 reps, x1 set x20 reps.     7. Pt will perform compensatory techniques during meals w/ min cues.     8. Pt will participate in instrumental re-evaluation of swallowing fnx in 7-10 days, pending progress towards this poc.  *Most recent MBS performed . Tentative date of re-evaluation  pending pt progress/participation.     Visit Dx:   No diagnosis found.  Patient Active Problem List   Diagnosis   • Atrial flutter (HCC)   • Dilated cardiomyopathy (HCC)   • Nonobstructive CAD per cath  10/3/2019   • Anxiety disorder   • Essential hypertension   • Tobacco use   • History of abdominal aortic aneurysm (AAA) repair   • Dyslipidemia, goal LDL below 100   • CVA (cerebral vascular accident) (HCC)   • Cerebrovascular accident (CVA), unspecified mechanism (HCC)     Past Medical History:   Diagnosis Date   • Atrial fibrillation (HCC)    • CHF (congestive heart failure) (HCC)    • COPD (chronic obstructive pulmonary disease) (HCC)    • Coronary artery disease    • Hypertension    • Tobacco abuse      Past Surgical History:   Procedure Laterality Date   • ABDOMINAL AORTIC ANEURYSM REPAIR     • CARDIAC CATHETERIZATION N/A 10/03/2019    Procedure: Left Heart Cath;  Surgeon: Vincent Phillips MD;  Location: [facility] CATH INVASIVE LOCATION;  Service: Cardiology       SLP Recommendation and Plan      Continue per POC.     Thank you-  Esme Hernandez MS CCC-SLP        EDUCATION  The patient has been educated in the following areas:   Dysphagia (Swallowing Impairment) Oral Care/Hydration Modified Diet Instruction.              Time Calculation:    Time Calculation- SLP     Row Name 09/09/22 1440             Time Calculation- SLP    SLP Start Time 1400  -JR      SLP Stop Time 1440  -      SLP Time Calculation (min) 40 min  -      SLP - Next Appointment 09/10/22  -            User Key  (r) = Recorded By, (t) = Taken By, (c) = Cosigned By    Initials Name Provider Type    Esme Bosch MS CCC-SLP Speech and Language Pathologist                Therapy Charges for Today     Code Description Service Date Service Provider Modifiers Qty    99244582147 HC ST EVAL ORAL PHARYNG SWALLOW 2 9/8/2022 Esme Hernandez MS CCC-SLP GN 1    00740323105 HC ST EVAL SPEECH AND PROD W LANG  1 9/8/2022 Esme Hernandez MS CCC-SLP GN 1    29766701685 HC ST TREATMENT SWALLOW 3 9/9/2022 Esme Hernandez MS CCC-SLP GN 1               MS ASHLEY Dobbins  9/9/2022   Xray Foot AP + Lateral + Oblique, Left

## 2023-05-21 NOTE — ED STATDOCS - OBJECTIVE STATEMENT
73 y/o F with a PMHx of DM, afib s/p ablation on eliquis, HTN, chronic cellulitis, peripheral venous insufficiency, thyroid nodule, neuropathy, dyspnea, and CHF presents to the ED s/p injury to L 2nd toe. pt went to  2 days ago and was supposed to follow-up at  today, but was prompted to the ED due to persistent bleeding. Doesn't recall mechanism of injury. Vasc: Dr. Yoo. PCP: Dr. Mayer.

## 2023-05-21 NOTE — ED STATDOCS - NSICDXPASTMEDICALHX_GEN_ALL_CORE_FT
PAST MEDICAL HISTORY:  At risk for sleep apnea     Atrial fibrillation     Cellulitis     Congestive heart failure     Diabetes mellitus type II, controlled     Dyspnea     HTN (hypertension)     Morbid obesity with BMI of 40.0-44.9, adult     Neuropathy     Peripheral venous insufficiency     Thyroid nodule

## 2023-05-21 NOTE — ED STATDOCS - PROGRESS NOTE DETAILS
71 yo female with a PMH of DM, htn, a fib on eliquis, PVD, LE cellulitis presents with L 2nd toe cellulitis. Pt was seen at urgent care and seen for a laceration on friday. Pt went back today and was told that it was still bleeding and looked infected. +chills. Denies fever.   Will check labs, BC, XR, IV abx, and podiatry consult. -Miah Rush PA-C Podiatry came down to see pt. Removed the stitches and evaluated to se if there was underlying infection. States they did not see anything and thinks that pt can go home with oral abx since she has not tried any oral abx. Pt also follows up with the wound center with Dr. Yoo on tuesday. Will d/c pt home on oral abx and was given strict return precautions. Pt aware and agrees with plan. -Miah Rush PA-C

## 2023-05-21 NOTE — ED STATDOCS - NSICDXPASTSURGICALHX_GEN_ALL_CORE_FT
PAST SURGICAL HISTORY:  H/O colonoscopy     History of cholecystectomy     History of esophagogastroduodenoscopy (EGD)     Other complications of gastric band procedure     S/P cataract surgery     S/P left knee arthroscopy     Status post medial meniscus repair

## 2023-05-21 NOTE — ED STATDOCS - SKIN, MLM
skin normal color for race, warm, dry and intact. healing laceration to L 2nd toe, chronic skin changes to dorsal aspect of foot

## 2023-05-21 NOTE — ED STATDOCS - NSFOLLOWUPINSTRUCTIONS_ED_ALL_ED_FT
Follow up with your primary care doctor and with the vascular doctor at the wound care center for further evaluation of your toe infection.   Take the antibiotics as directed.  Return to the ER for any fever, redness, streaking, or any new or other concerns.       Cellulitis    WHAT YOU NEED TO KNOW:    Cellulitis is a skin infection caused by bacteria. Cellulitis may go away on its own or you may need treatment. Your healthcare provider may draw a [place] around the outside edges of your cellulitis. If your cellulitis spreads, your healthcare provider will see it outside of the [place]. Cellulitis          DISCHARGE INSTRUCTIONS:    Call 911 if:     You have sudden trouble breathing or chest pain.        Return to the emergency department if:     Your wound gets larger and more painful.       You feel a crackling under your skin when you touch it.      You have purple dots or bumps on your skin, or you see bleeding under your skin.      You have new swelling and pain in your legs.      The red, warm, swollen area gets larger.      You see red streaks coming from the infected area.    Contact your healthcare provider if:     You have a fever.      Your fever or pain does not go away or gets worse.      The area does not get smaller after 2 days of antibiotics.      Your skin is flaking or peeling off.      You have questions or concerns about your condition or care.    Medicines:     Antibiotics help treat the bacterial infection.       NSAIDs, such as ibuprofen, help decrease swelling, pain, and fever. NSAIDs can cause stomach bleeding or kidney problems in certain people. If you take blood thinner medicine, always ask if NSAIDs are safe for you. Always read the medicine label and follow directions. Do not give these medicines to children under 6 months of age without direction from your child's healthcare provider.      Acetaminophen decreases pain and fever. It is available without a doctor's order. Ask how much to take and how often to take it. Follow directions. Read the labels of all other medicines you are using to see if they also contain acetaminophen, or ask your doctor or pharmacist. Acetaminophen can cause liver damage if not taken correctly. Do not use more than 4 grams (4,000 milligrams) total of acetaminophen in one day.       Take your medicine as directed. Contact your healthcare provider if you think your medicine is not helping or if you have side effects. Tell him or her if you are allergic to any medicine. Keep a list of the medicines, vitamins, and herbs you take. Include the amounts, and when and why you take them. Bring the list or the pill bottles to follow-up visits. Carry your medicine list with you in case of an emergency.    Self-care:     Elevate the area above the level of your heart as often as you can. This will help decrease swelling and pain. Prop the area on pillows or blankets to keep it elevated comfortably.       Clean the area daily until the wound scabs over. Gently wash the area with soap and water. Pat dry. Use dressings as directed.       Place cool or warm, wet cloths on the area as directed. Use clean cloths and clean water. Leave it on the area until the cloth is room temperature. Pat the area dry with a clean, dry cloth. The cloths may help decrease pain.     Prevent cellulitis:     Do not scratch bug bites or areas of injury. You increase your risk for cellulitis by scratching these areas.       Do not share personal items, such as towels, clothing, and razors.       Clean exercise equipment with germ-killing  before and after you use it.      Wash your hands often. Use soap and water. Wash your hands after you use the bathroom, change a child's diapers, or sneeze. Wash your hands before you prepare or eat food. Use lotion to prevent dry, cracked skin. Handwashing       Follow up with your healthcare provider within 3 days, or as directed: Your healthcare provider will check if your cellulitis is getting better. You may need different medicine. Write down your questions so you remember to ask them during your visits.

## 2023-05-21 NOTE — ED ADULT TRIAGE NOTE - CHIEF COMPLAINT QUOTE
pt presents to ed from urgent care for evaluation of left 2nd toe laceration with persistent bleeding and discoloration since friday s/p hitting it. pt on eliquis

## 2023-05-24 ENCOUNTER — OUTPATIENT (OUTPATIENT)
Dept: OUTPATIENT SERVICES | Facility: HOSPITAL | Age: 73
LOS: 1 days | End: 2023-05-24
Payer: MEDICARE

## 2023-05-24 DIAGNOSIS — Z98.890 OTHER SPECIFIED POSTPROCEDURAL STATES: Chronic | ICD-10-CM

## 2023-05-24 DIAGNOSIS — S91.102A UNSPECIFIED OPEN WOUND OF LEFT GREAT TOE WITHOUT DAMAGE TO NAIL, INITIAL ENCOUNTER: ICD-10-CM

## 2023-05-24 DIAGNOSIS — Z90.49 ACQUIRED ABSENCE OF OTHER SPECIFIED PARTS OF DIGESTIVE TRACT: Chronic | ICD-10-CM

## 2023-05-24 DIAGNOSIS — Z98.49 CATARACT EXTRACTION STATUS, UNSPECIFIED EYE: Chronic | ICD-10-CM

## 2023-05-24 DIAGNOSIS — K95.09 OTHER COMPLICATIONS OF GASTRIC BAND PROCEDURE: Chronic | ICD-10-CM

## 2023-05-24 PROCEDURE — 99213 OFFICE O/P EST LOW 20 MIN: CPT

## 2023-05-25 DIAGNOSIS — Z79.84 LONG TERM (CURRENT) USE OF ORAL HYPOGLYCEMIC DRUGS: ICD-10-CM

## 2023-05-25 DIAGNOSIS — I49.9 CARDIAC ARRHYTHMIA, UNSPECIFIED: ICD-10-CM

## 2023-05-25 DIAGNOSIS — I73.89 OTHER SPECIFIED PERIPHERAL VASCULAR DISEASES: ICD-10-CM

## 2023-05-25 DIAGNOSIS — E11.40 TYPE 2 DIABETES MELLITUS WITH DIABETIC NEUROPATHY, UNSPECIFIED: ICD-10-CM

## 2023-05-25 DIAGNOSIS — L97.522 NON-PRESSURE CHRONIC ULCER OF OTHER PART OF LEFT FOOT WITH FAT LAYER EXPOSED: ICD-10-CM

## 2023-05-25 DIAGNOSIS — I50.9 HEART FAILURE, UNSPECIFIED: ICD-10-CM

## 2023-05-25 DIAGNOSIS — L03.115 CELLULITIS OF RIGHT LOWER LIMB: ICD-10-CM

## 2023-05-25 DIAGNOSIS — I11.0 HYPERTENSIVE HEART DISEASE WITH HEART FAILURE: ICD-10-CM

## 2023-05-25 DIAGNOSIS — Z91.81 HISTORY OF FALLING: ICD-10-CM

## 2023-05-25 DIAGNOSIS — M19.90 UNSPECIFIED OSTEOARTHRITIS, UNSPECIFIED SITE: ICD-10-CM

## 2023-05-31 ENCOUNTER — OUTPATIENT (OUTPATIENT)
Dept: OUTPATIENT SERVICES | Facility: HOSPITAL | Age: 73
LOS: 1 days | End: 2023-05-31
Payer: MEDICARE

## 2023-05-31 DIAGNOSIS — Z98.890 OTHER SPECIFIED POSTPROCEDURAL STATES: Chronic | ICD-10-CM

## 2023-05-31 DIAGNOSIS — Z98.49 CATARACT EXTRACTION STATUS, UNSPECIFIED EYE: Chronic | ICD-10-CM

## 2023-05-31 DIAGNOSIS — Z90.49 ACQUIRED ABSENCE OF OTHER SPECIFIED PARTS OF DIGESTIVE TRACT: Chronic | ICD-10-CM

## 2023-05-31 DIAGNOSIS — K95.09 OTHER COMPLICATIONS OF GASTRIC BAND PROCEDURE: Chronic | ICD-10-CM

## 2023-05-31 DIAGNOSIS — L03.115 CELLULITIS OF RIGHT LOWER LIMB: ICD-10-CM

## 2023-05-31 PROCEDURE — 99212 OFFICE O/P EST SF 10 MIN: CPT

## 2023-06-01 DIAGNOSIS — L97.522 NON-PRESSURE CHRONIC ULCER OF OTHER PART OF LEFT FOOT WITH FAT LAYER EXPOSED: ICD-10-CM

## 2023-06-01 DIAGNOSIS — I73.89 OTHER SPECIFIED PERIPHERAL VASCULAR DISEASES: ICD-10-CM

## 2023-06-01 DIAGNOSIS — E11.40 TYPE 2 DIABETES MELLITUS WITH DIABETIC NEUROPATHY, UNSPECIFIED: ICD-10-CM

## 2023-06-01 DIAGNOSIS — M19.90 UNSPECIFIED OSTEOARTHRITIS, UNSPECIFIED SITE: ICD-10-CM

## 2023-06-01 DIAGNOSIS — I11.0 HYPERTENSIVE HEART DISEASE WITH HEART FAILURE: ICD-10-CM

## 2023-06-01 DIAGNOSIS — I50.9 HEART FAILURE, UNSPECIFIED: ICD-10-CM

## 2023-06-01 DIAGNOSIS — L03.115 CELLULITIS OF RIGHT LOWER LIMB: ICD-10-CM

## 2023-06-01 DIAGNOSIS — Z79.84 LONG TERM (CURRENT) USE OF ORAL HYPOGLYCEMIC DRUGS: ICD-10-CM

## 2023-06-01 DIAGNOSIS — Z91.81 HISTORY OF FALLING: ICD-10-CM

## 2023-06-01 DIAGNOSIS — I49.9 CARDIAC ARRHYTHMIA, UNSPECIFIED: ICD-10-CM

## 2023-06-07 ENCOUNTER — OUTPATIENT (OUTPATIENT)
Dept: OUTPATIENT SERVICES | Facility: HOSPITAL | Age: 73
LOS: 1 days | End: 2023-06-07
Payer: MEDICARE

## 2023-06-07 DIAGNOSIS — Z98.890 OTHER SPECIFIED POSTPROCEDURAL STATES: Chronic | ICD-10-CM

## 2023-06-07 DIAGNOSIS — L03.115 CELLULITIS OF RIGHT LOWER LIMB: ICD-10-CM

## 2023-06-07 DIAGNOSIS — K95.09 OTHER COMPLICATIONS OF GASTRIC BAND PROCEDURE: Chronic | ICD-10-CM

## 2023-06-07 DIAGNOSIS — Z98.49 CATARACT EXTRACTION STATUS, UNSPECIFIED EYE: Chronic | ICD-10-CM

## 2023-06-07 DIAGNOSIS — Z90.49 ACQUIRED ABSENCE OF OTHER SPECIFIED PARTS OF DIGESTIVE TRACT: Chronic | ICD-10-CM

## 2023-06-07 PROCEDURE — 93923 UPR/LXTR ART STDY 3+ LVLS: CPT

## 2023-06-07 PROCEDURE — 93923 UPR/LXTR ART STDY 3+ LVLS: CPT | Mod: 26

## 2023-06-07 PROCEDURE — 73630 X-RAY EXAM OF FOOT: CPT | Mod: 26,LT

## 2023-06-07 PROCEDURE — 73630 X-RAY EXAM OF FOOT: CPT | Mod: LT

## 2023-06-07 PROCEDURE — 99212 OFFICE O/P EST SF 10 MIN: CPT | Mod: 25

## 2023-06-09 ENCOUNTER — APPOINTMENT (OUTPATIENT)
Dept: CARDIOLOGY | Facility: CLINIC | Age: 73
End: 2023-06-09

## 2023-06-14 ENCOUNTER — NON-APPOINTMENT (OUTPATIENT)
Age: 73
End: 2023-06-14

## 2023-06-14 ENCOUNTER — APPOINTMENT (OUTPATIENT)
Dept: ELECTROPHYSIOLOGY | Facility: CLINIC | Age: 73
End: 2023-06-14
Payer: MEDICARE

## 2023-06-14 VITALS
WEIGHT: 260 LBS | OXYGEN SATURATION: 96 % | SYSTOLIC BLOOD PRESSURE: 142 MMHG | TEMPERATURE: 98.7 F | HEART RATE: 57 BPM | HEIGHT: 61 IN | BODY MASS INDEX: 49.09 KG/M2 | DIASTOLIC BLOOD PRESSURE: 60 MMHG

## 2023-06-14 DIAGNOSIS — I35.0 NONRHEUMATIC AORTIC (VALVE) STENOSIS: ICD-10-CM

## 2023-06-14 PROCEDURE — 99214 OFFICE O/P EST MOD 30 MIN: CPT

## 2023-06-14 PROCEDURE — 93000 ELECTROCARDIOGRAM COMPLETE: CPT

## 2023-06-14 RX ORDER — APIXABAN 5 MG/1
5 TABLET, FILM COATED ORAL
Qty: 14 | Refills: 0 | Status: DISCONTINUED | COMMUNITY
Start: 2023-01-04 | End: 2023-06-14

## 2023-06-14 NOTE — DISCUSSION/SUMMARY
[FreeTextEntry1] : In summary, the patient is a 73 year old female with a history of HFpEF, moderate aortic stenosis and long-standing persistent atrial fibrillation s/p ablation in 2/2023. She continues to maintain sinus rhythm with marked improvement in symptoms since the ablation procedure. She is on eliquis 5 mg bid for stroke prophylaxis but the medications is very expensive for her. Will order pradaxa (generic alternative dabigatran) instead, which may be less cost prohibitive. She will continue metoprolol and other heart failure medications. She will return for follow up in 6 months or earlier if needed.  [EKG obtained to assist in diagnosis and management of assessed problem(s)] : EKG obtained to assist in diagnosis and management of assessed problem(s)

## 2023-06-14 NOTE — HISTORY OF PRESENT ILLNESS
[FreeTextEntry1] : The patient is a pleasant 72-year-old female here for follow up today. She has a medical history of heart failure with preserved ejection fraction (LVEF 50-55%), moderate aortic stenosis, persistent atrial fibrillation, hypertension, hyperlipidemia, type 2 diabetes mellitus, chronic peripheral vascular disease, and morbid obesity (previous lap band procedure, since removed). \par \par To summarize, the patient was hospitalized in late 2022 with worsening dyspnea on exertion and lower extremity edema. She was noted to be in atrial fibrillation at the time. An outpatient TTE had revealed deteriorating aortic stenosis, prompting consideration for (TAVR). A repeat echocardiogram showed moderate aortic stenosis, and left heart catheterization on December 20 demonstrated no significant aortic valve gradients. The patient's symptoms were felt to be disproportionate to the severity of aortic stenosis. Notably the patient had been in persistent atrial fibrillation for the past three years. A successful direct current cardioversion was performed on December 22, 2022, and she was discharged on amiodarone. The patient reported significant symptomatic improvement while in sinus rhythm which was unfortunately short lived and was followed by recurrence of atrial fibrillation. She underwent catheter ablation for atrial fibrillation on 2/13/23 (PVI, posterior box, and CTI). Amiodarone was eventually discontinued post-ablation. She wore and outpatient monitor on 5/3/23 which did not reveal any evidence of atrial fibrillation. She continues to do well and reports significant improvement in her symptoms since the ablation procedure. She denies any palpitations, dizziness or syncope. She is on eliquis which is very expensive for her (approx $1000 per month). \par

## 2023-06-16 DIAGNOSIS — M20.41 OTHER HAMMER TOE(S) (ACQUIRED), RIGHT FOOT: ICD-10-CM

## 2023-06-16 DIAGNOSIS — I73.9 PERIPHERAL VASCULAR DISEASE, UNSPECIFIED: ICD-10-CM

## 2023-06-16 DIAGNOSIS — I10 ESSENTIAL (PRIMARY) HYPERTENSION: ICD-10-CM

## 2023-06-16 DIAGNOSIS — E11.621 TYPE 2 DIABETES MELLITUS WITH FOOT ULCER: ICD-10-CM

## 2023-06-16 DIAGNOSIS — L97.522 NON-PRESSURE CHRONIC ULCER OF OTHER PART OF LEFT FOOT WITH FAT LAYER EXPOSED: ICD-10-CM

## 2023-06-16 DIAGNOSIS — I11.0 HYPERTENSIVE HEART DISEASE WITH HEART FAILURE: ICD-10-CM

## 2023-06-16 DIAGNOSIS — I73.89 OTHER SPECIFIED PERIPHERAL VASCULAR DISEASES: ICD-10-CM

## 2023-06-16 DIAGNOSIS — L03.115 CELLULITIS OF RIGHT LOWER LIMB: ICD-10-CM

## 2023-06-16 DIAGNOSIS — Z79.84 LONG TERM (CURRENT) USE OF ORAL HYPOGLYCEMIC DRUGS: ICD-10-CM

## 2023-06-16 DIAGNOSIS — E11.40 TYPE 2 DIABETES MELLITUS WITH DIABETIC NEUROPATHY, UNSPECIFIED: ICD-10-CM

## 2023-06-16 DIAGNOSIS — Z91.81 HISTORY OF FALLING: ICD-10-CM

## 2023-06-16 DIAGNOSIS — M19.90 UNSPECIFIED OSTEOARTHRITIS, UNSPECIFIED SITE: ICD-10-CM

## 2023-06-16 DIAGNOSIS — I50.9 HEART FAILURE, UNSPECIFIED: ICD-10-CM

## 2023-06-16 DIAGNOSIS — I49.9 CARDIAC ARRHYTHMIA, UNSPECIFIED: ICD-10-CM

## 2023-06-21 ENCOUNTER — OUTPATIENT (OUTPATIENT)
Dept: OUTPATIENT SERVICES | Facility: HOSPITAL | Age: 73
LOS: 1 days | End: 2023-06-21
Payer: MEDICARE

## 2023-06-21 DIAGNOSIS — Z90.49 ACQUIRED ABSENCE OF OTHER SPECIFIED PARTS OF DIGESTIVE TRACT: Chronic | ICD-10-CM

## 2023-06-21 DIAGNOSIS — Z98.890 OTHER SPECIFIED POSTPROCEDURAL STATES: Chronic | ICD-10-CM

## 2023-06-21 DIAGNOSIS — K95.09 OTHER COMPLICATIONS OF GASTRIC BAND PROCEDURE: Chronic | ICD-10-CM

## 2023-06-21 DIAGNOSIS — L03.115 CELLULITIS OF RIGHT LOWER LIMB: ICD-10-CM

## 2023-06-21 DIAGNOSIS — Z98.49 CATARACT EXTRACTION STATUS, UNSPECIFIED EYE: Chronic | ICD-10-CM

## 2023-06-21 PROCEDURE — 99212 OFFICE O/P EST SF 10 MIN: CPT

## 2023-06-26 ENCOUNTER — NON-APPOINTMENT (OUTPATIENT)
Age: 73
End: 2023-06-26

## 2023-06-30 DIAGNOSIS — L03.115 CELLULITIS OF RIGHT LOWER LIMB: ICD-10-CM

## 2023-06-30 DIAGNOSIS — I73.89 OTHER SPECIFIED PERIPHERAL VASCULAR DISEASES: ICD-10-CM

## 2023-06-30 DIAGNOSIS — I49.9 CARDIAC ARRHYTHMIA, UNSPECIFIED: ICD-10-CM

## 2023-06-30 DIAGNOSIS — E11.40 TYPE 2 DIABETES MELLITUS WITH DIABETIC NEUROPATHY, UNSPECIFIED: ICD-10-CM

## 2023-06-30 DIAGNOSIS — I11.0 HYPERTENSIVE HEART DISEASE WITH HEART FAILURE: ICD-10-CM

## 2023-06-30 DIAGNOSIS — L97.522 NON-PRESSURE CHRONIC ULCER OF OTHER PART OF LEFT FOOT WITH FAT LAYER EXPOSED: ICD-10-CM

## 2023-06-30 DIAGNOSIS — Z79.84 LONG TERM (CURRENT) USE OF ORAL HYPOGLYCEMIC DRUGS: ICD-10-CM

## 2023-06-30 DIAGNOSIS — I50.9 HEART FAILURE, UNSPECIFIED: ICD-10-CM

## 2023-06-30 DIAGNOSIS — Z91.81 HISTORY OF FALLING: ICD-10-CM

## 2023-06-30 DIAGNOSIS — M19.90 UNSPECIFIED OSTEOARTHRITIS, UNSPECIFIED SITE: ICD-10-CM

## 2023-07-05 ENCOUNTER — OUTPATIENT (OUTPATIENT)
Dept: OUTPATIENT SERVICES | Facility: HOSPITAL | Age: 73
LOS: 1 days | End: 2023-07-05
Payer: MEDICARE

## 2023-07-05 DIAGNOSIS — Z98.890 OTHER SPECIFIED POSTPROCEDURAL STATES: Chronic | ICD-10-CM

## 2023-07-05 DIAGNOSIS — L03.115 CELLULITIS OF RIGHT LOWER LIMB: ICD-10-CM

## 2023-07-05 DIAGNOSIS — Z90.49 ACQUIRED ABSENCE OF OTHER SPECIFIED PARTS OF DIGESTIVE TRACT: Chronic | ICD-10-CM

## 2023-07-05 DIAGNOSIS — Z98.49 CATARACT EXTRACTION STATUS, UNSPECIFIED EYE: Chronic | ICD-10-CM

## 2023-07-05 DIAGNOSIS — K95.09 OTHER COMPLICATIONS OF GASTRIC BAND PROCEDURE: Chronic | ICD-10-CM

## 2023-07-05 PROCEDURE — 99212 OFFICE O/P EST SF 10 MIN: CPT

## 2023-07-10 ENCOUNTER — RX RENEWAL (OUTPATIENT)
Age: 73
End: 2023-07-10

## 2023-07-11 DIAGNOSIS — M19.90 UNSPECIFIED OSTEOARTHRITIS, UNSPECIFIED SITE: ICD-10-CM

## 2023-07-11 DIAGNOSIS — I73.89 OTHER SPECIFIED PERIPHERAL VASCULAR DISEASES: ICD-10-CM

## 2023-07-11 DIAGNOSIS — I49.9 CARDIAC ARRHYTHMIA, UNSPECIFIED: ICD-10-CM

## 2023-07-11 DIAGNOSIS — Z79.84 LONG TERM (CURRENT) USE OF ORAL HYPOGLYCEMIC DRUGS: ICD-10-CM

## 2023-07-11 DIAGNOSIS — I11.0 HYPERTENSIVE HEART DISEASE WITH HEART FAILURE: ICD-10-CM

## 2023-07-11 DIAGNOSIS — Z91.81 HISTORY OF FALLING: ICD-10-CM

## 2023-07-11 DIAGNOSIS — E11.40 TYPE 2 DIABETES MELLITUS WITH DIABETIC NEUROPATHY, UNSPECIFIED: ICD-10-CM

## 2023-07-11 DIAGNOSIS — I50.9 HEART FAILURE, UNSPECIFIED: ICD-10-CM

## 2023-08-16 ENCOUNTER — RX RENEWAL (OUTPATIENT)
Age: 73
End: 2023-08-16

## 2023-08-20 NOTE — H&P ADULT - DOES THIS PATIENT HAVE A HISTORY OF OR HAS BEEN DX WITH HEART FAILURE?
[facility] HOSPITALIST PROGRESS NOTE     Patient Identification:  Name:  Reny Elena  Age:  64 y.o.  Sex:  female  :  1959  MRN:  [id number]  Visit Number:  [id number]  Primary Care Provider:  Susan Stephens APRN    Length of stay:  1    Chief complaint: None    Subjective:    Patient seen and examined at bedside with no nursing staff present.  Patient states she is doing well today and has no complaints.  Patient's  is still hospitalized in the intensive care unit and unable to provide care for her at home.  However, patient states that she does have family who will be available to help her at home starting tomorrow.  As such, we will plan to continue peritoneal dialysis today and anticipate discharge home tomorrow.  ----------------------------------------------------------------------------------------------------------------------  Current Hospital Meds:  amLODIPine, 10 mg, Oral, Daily  aspirin, 81 mg, Oral, Daily  atorvastatin, 80 mg, Oral, Nightly  FLUoxetine, 40 mg, Oral, QAM  gabapentin, 300 mg, Oral, Daily  heparin (porcine), 5,000 Units, Subcutaneous, Q12H  insulin glargine, 6 Units, Subcutaneous, Nightly  insulin lispro, 2-7 Units, Subcutaneous, 4x Daily AC & at Bedtime  isosorbide mononitrate, 30 mg, Oral, QAM  levothyroxine, 25 mcg, Oral, Daily  metoprolol succinate XL, 25 mg, Oral, Daily  multivitamin, 1 tablet, Oral, Daily  pantoprazole, 40 mg, Oral, Daily  rOPINIRole, 0.5 mg, Oral, BID  senna-docusate sodium, 2 tablet, Oral, BID  sodium chloride, 10 mL, Intravenous, Q12H  traZODone, 50 mg, Oral, Nightly         ----------------------------------------------------------------------------------------------------------------------  Vital Signs:  Temp:  [98.2 °F (36.8 °C)-99.9 °F (37.7 °C)] 98.2 °F (36.8 °C)  Heart Rate:  [72-96] 96  Resp:  [20-22] 22  BP: (139-197)/(71-92) 180/82      23  1014 23  1718 23  0500   Weight: 52.6 kg (116 lb) 56 kg (123  lb 6.4 oz) 56 kg (123 lb 6.4 oz) (new admit)     Body mass index is 155.48 kg/m².    Intake/Output Summary (Last 24 hours) at 8/20/2023 1158  Last data filed at 8/20/2023 0900  Gross per 24 hour   Intake 360 ml   Output 1096 ml   Net -736 ml     Diet: Regular/House Diet; Texture: Regular Texture (IDDSI 7); Fluid Consistency: Thin (IDDSI 0)  ----------------------------------------------------------------------------------------------------------------------  Physical exam:  Constitutional: Chronically ill-appearing  female in no apparent distress.     HENT:  Head:  Normocephalic and atraumatic.  Mouth:  Moist mucous membranes.    Eyes:  Conjunctivae and EOM are normal.  Pupils are equal, round, and reactive to light.  No scleral icterus.    Neck:  Neck supple. No thyromegaly.  No JVD present.    Cardiovascular:  Regular rate and rhythm with no murmurs, rubs, clicks or gallops appreciated.  Pulmonary/Chest:  Clear to auscultation bilaterally with no crackles, wheezes or rhonchi appreciated.  Abdominal:  Soft. Nontender. Nondistended  Bowel sounds are normal in all four quadrants. No organomegally appreciated.   Musculoskeletal:  No edema, no tenderness, and no deformity.  No red or swollen joints anywhere.    Neurological:  Alert, Cranial nerves II-XII intact with no focal deficits.  No facial droop.  No slurred speech.   Skin:  Warm and dry to palpation with no rashes or lesions appreciated.  Peripheral vascular:  2+ radial and pedal pulses in bilateral upper and lower extremities.  Psychiatric:  Alert and oriented x3, demonstrates appropriate judgment and insight.  -------------------------------------------------------------------------------------------------  ----------------------------------------------------------------------------------------------------------------------  Results from last 7 days   Lab Units 08/19/23  1414 08/19/23  1109   HSTROP T ng/L 150* 158*     Results from last 7 days   Lab  Units 08/20/23  0124 08/19/23  1109   WBC 10*3/mm3 5.94 5.95   HEMOGLOBIN g/dL 8.0* 10.1*   HEMATOCRIT % 25.3* 31.3*   MCV fL 94.4 92.6   MCHC g/dL 31.6 32.3   PLATELETS 10*3/mm3 120* 106*   INR   --  0.85*         Results from last 7 days   Lab Units 08/20/23  0124 08/19/23  1109   SODIUM mmol/L 138 138   POTASSIUM mmol/L 3.6 4.0   MAGNESIUM mg/dL  --  2.2   CHLORIDE mmol/L 104 102   CO2 mmol/L 21.0* 22.2   BUN mg/dL 49* 55*   CREATININE mg/dL 4.89* 5.19*   CALCIUM mg/dL 7.1* 7.4*   GLUCOSE mg/dL 366* 264*   ALBUMIN g/dL 2.6* 3.3*   BILIRUBIN mg/dL 0.2 0.3   ALK PHOS U/L 63 82   AST (SGOT) U/L 11 16   ALT (SGPT) U/L 9 13   Estimated Creatinine Clearance: 4.9 mL/min (A) (by C-G formula based on SCr of 4.89 mg/dL (H)).    No results found for: AMMONIA      No results found for: BLOODCX  No results found for: URINECX  No results found for: WOUNDCX  No results found for: STOOLCX    I have personally looked at the labs and they are summarized above.  ----------------------------------------------------------------------------------------------------------------------  Imaging Results (Last 24 Hours)       Procedure Component Value Units Date/Time    XR Chest AP [[id number]] Collected: 08/19/23 1452     Updated: 08/19/23 1455    Narrative:      Procedure: Frontal view of chest obtained.     Comparison: None available     History: weakness; N18.9-Chronic kidney disease, unspecified;  Z78.9-Other specified health status     Findings:     No pneumonia or acute process seen in the chest.  Normal heart size and mediastinal contours.  Trachea is in midline position.  No edema or effusion is seen.  There is no evidence of pneumothorax.       Impression:         No evidence of acute disease in the chest.     This report was finalized on 8/19/2023 2:53 PM by Steven Melton MD.             ----------------------------------------------------------------------------------------------------------------------  Assessment and  Plan:    ESRD on PD -unfortunately, patient was unable to perform peritoneal dialysis at home for 5 days prior to evaluation in our hospital.  We will continue peritoneal dialysis while hospitalized.  Patient states that she will have help at home starting tomorrow to be able to do PD at home.    2.  Essential hypertension -has improved with first round of peritoneal dialysis.  Continue to monitor closely and adjust antihypertensive medications as this is very    3.  Type 2 diabetes mellitus -continue Accu-Cheks before every meal and nightly with sliding scale insulin    4.  Multiple electrolyte abnormalities -secondary to missing peritoneal dialysis for the past 5 days.  Slowly improving with restarting PD    5.  Newly diagnosed hypothyroidism -serum TSH significantly elevated at 155, free T4 decreased at 0.39.  Have started Synthroid 25 mcg p.o. daily.  We will encourage patient to follow-up with endocrinology in an outpatient setting after discharge.    Disposition probable discharge tomorrow    Narayan Mayorga DO   08/20/23   11:58 EDT      yes

## 2023-08-29 ENCOUNTER — RX RENEWAL (OUTPATIENT)
Age: 73
End: 2023-08-29

## 2023-08-29 RX ORDER — SPIRONOLACTONE 25 MG/1
25 TABLET ORAL DAILY
Qty: 30 | Refills: 0 | Status: ACTIVE | COMMUNITY
Start: 2023-04-14 | End: 1900-01-01

## 2023-09-13 ENCOUNTER — APPOINTMENT (OUTPATIENT)
Dept: VASCULAR SURGERY | Facility: CLINIC | Age: 73
End: 2023-09-13
Payer: MEDICARE

## 2023-09-13 VITALS
WEIGHT: 260 LBS | SYSTOLIC BLOOD PRESSURE: 130 MMHG | OXYGEN SATURATION: 98 % | DIASTOLIC BLOOD PRESSURE: 70 MMHG | HEIGHT: 61 IN | BODY MASS INDEX: 49.09 KG/M2 | HEART RATE: 78 BPM

## 2023-09-13 DIAGNOSIS — I89.0 LYMPHEDEMA, NOT ELSEWHERE CLASSIFIED: ICD-10-CM

## 2023-09-13 PROCEDURE — 99213 OFFICE O/P EST LOW 20 MIN: CPT

## 2023-09-13 RX ORDER — CHOLESTYRAMINE 4 G/9G
4 POWDER, FOR SUSPENSION ORAL TWICE DAILY
Qty: 180 | Refills: 2 | Status: DISCONTINUED | COMMUNITY
Start: 2020-07-07 | End: 2023-09-13

## 2023-09-20 RX ORDER — LOSARTAN POTASSIUM 25 MG/1
25 TABLET, FILM COATED ORAL
Qty: 90 | Refills: 3 | Status: ACTIVE | COMMUNITY
Start: 2023-07-10 | End: 1900-01-01

## 2023-09-27 DIAGNOSIS — M19.90 UNSPECIFIED OSTEOARTHRITIS, UNSPECIFIED SITE: ICD-10-CM

## 2023-10-02 RX ORDER — GABAPENTIN 800 MG/1
800 TABLET, COATED ORAL 3 TIMES DAILY
Qty: 270 | Refills: 3 | Status: ACTIVE | COMMUNITY
Start: 2021-03-12 | End: 1900-01-01

## 2023-10-02 NOTE — ED ADULT NURSE NOTE - NS PRO PASSIVE SMOKE EXP
Unknown Adbry Pregnancy And Lactation Text: It is unknown if this medication will adversely affect pregnancy or breast feeding.

## 2023-10-24 NOTE — ED ADULT NURSE NOTE - NSICDXPASTMEDICALHX_GEN_ALL_CORE_FT
Statement Selected
PAST MEDICAL HISTORY:  At risk for sleep apnea     Atrial fibrillation     Cellulitis     Congestive heart failure     Diabetes mellitus type II, controlled     Dyspnea     HTN (hypertension)     Morbid obesity with BMI of 40.0-44.9, adult     Neuropathy     Peripheral venous insufficiency     Thyroid nodule

## 2023-11-30 NOTE — ED STATDOCS - PROGRESS NOTE
Preventive Health Recommendations  Female Ages 50 - 64    Yearly exam: See your health care provider every year in order to  Review health changes.   Discuss preventive care.    Review your medicines if your doctor has prescribed any.    Get a Pap test every three years (unless you have an abnormal result and your provider advises testing more often).  If you get Pap tests with HPV test, you only need to test every 5 years, unless you have an abnormal result.   You do not need a Pap test if your uterus was removed (hysterectomy) and you have not had cancer.  You should be tested each year for STDs (sexually transmitted diseases) if you're at risk.   Have a mammogram every 1 to 2 years.  Have a colonoscopy at age 45, or have a yearly FIT test (stool test). These exams screen for colon cancer.    Have a cholesterol test every 5 years, or more often if advised.  Have a diabetes test (fasting glucose) every three years. If you are at risk for diabetes, you should have this test more often.   If you are at risk for osteoporosis (brittle bone disease), think about having a bone density scan (DEXA).    Shots: Get a flu shot each year. Get a tetanus shot every 10 years.    Nutrition:   Eat at least 5 servings of fruits and vegetables each day.  Eat whole-grain bread, whole-wheat pasta and brown rice instead of white grains and rice.  Get adequate Calcium and Vitamin D.     Lifestyle  Exercise at least 150 minutes a week (30 minutes a day, 5 days a week). This will help you control your weight and prevent disease.  Limit alcohol to one drink per day.  No smoking.   Wear sunscreen to prevent skin cancer.   See your dentist every six months for an exam and cleaning.  See your eye doctor every 1 to 2 years.    
Stable.

## 2023-12-13 ENCOUNTER — APPOINTMENT (OUTPATIENT)
Dept: ELECTROPHYSIOLOGY | Facility: CLINIC | Age: 73
End: 2023-12-13

## 2023-12-24 NOTE — ED ADULT TRIAGE NOTE - WEIGHT METHOD
[facility]   IN-PATIENT SERVICE   [facility]    Progress note             Date:   12/24/2023  Patient name:  Elaina Champagne  Date of admission:  12/14/2023 11:42 AM  MRN:   [id number]  Account:  [id number]  YOB: 1952  PCP:    Robin Ly MD  Room:   [id number]  Code Status:    DNR-CCA    Chief Complaint:     Chief Complaint   Patient presents with    Leg Pain     Left      Fatigue       History Obtained From:     patient, electronic medical record    History of Present Illness:     The patient is a 71 y.o.  Non- / non  female who presents with Leg Pain (Left/) and Fatigue   and she is admitted to the hospital for the management of wound check  Patient has past medical is a multiple medical problem including morbid obesity, hypertension, lymphedema, patient has wound in both legs and back of her legs, symptoms are going on for last 1 month  Patient follows with wound care  She was evaluated by Dr. Meredith vascular surgery yesterday, dressing was applied on left leg, she was having severe pain that made her come to the hospital  Patient, had arterial scan done earlier suggestive of PHILL of 0.68 on left side, on right side PHILL was okay  Previous wound culture was growing Klebsiella and staph, MSSA  In the emergency room, patient had lab work done suggestive elevated creatinine of 3.8  CK was normal      Past Medical History:     Past Medical History:   Diagnosis Date    Bell's palsy     Cellulitis     Hypertension         Past Surgical History:     Past Surgical History:   Procedure Laterality Date    CATARACT REMOVAL Bilateral 2015    HYSTERECTOMY, TOTAL ABDOMINAL (CERVIX REMOVED)  1990    INCISION AND DRAINAGE Left 07/06/2017    LEG INCISION AND DRAINAGE ABSCESS performed by Isaiah Casey MD at [facility] OR    IR NONTUNNELED VASCULAR CATHETER  12/15/2023    IR NONTUNNELED VASCULAR CATHETER 12/15/2023 [facility] SPECIAL PROCEDURES    KNEE ARTHROPLASTY Right  stated

## 2024-01-29 ENCOUNTER — RX RENEWAL (OUTPATIENT)
Age: 74
End: 2024-01-29

## 2024-01-29 RX ORDER — METOPROLOL SUCCINATE 50 MG/1
50 TABLET, EXTENDED RELEASE ORAL DAILY
Qty: 90 | Refills: 0 | Status: ACTIVE | COMMUNITY
Start: 2024-01-29 | End: 1900-01-01

## 2024-01-29 NOTE — ED PROVIDER NOTE - CADM POA URETHRAL CATHETER
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      Reassuring EGD/CLN exams 8/18/2022 as below; colonoscopy exam showed pan colonic diverticulosis no neoplasms.    We discussed genetic counselor consultation regarding her strong family history of gastric cancer.    As per recent email, Ms. Danielson is recovering from her first episode of what turned out to be fairly severe acute colonic diverticulitis.    Hospitalized to the ED 12/25/2023 after suffering worsening pain preceding 3 days.  From 12/22/2023 onwards she was describing worsening diffuse abdominal pain and likely ileus.  Took at least 1 dose of milk of magnesia with no response.  Ms. Danielson came into the ED for evaluation 12/25/2023 with CT scan showing ascending colon phlegmon, some free air, suspected contained perforation \"large area of phlegmonous inflammatory change with internal air adjacent to the thickened portion of  the ascending colon at the hepatic flexure...\"     Ms. Danielson does recall at least 2 large snacks of popcorn 12/20-12/21/2023.    Ms. Danielson responded remarkably well to conservative management, broad-spectrum IV antibiotics and then discharged on Augmentin 12/29/2023 after reassuring follow-up inpt CT scan 12/28/2023.    Presented to the ED with similar symptoms, swabbed positive for Influenza A 1/5/2024 of uncertain significance.    Since discharge, Ms. Danielson has done well.  Symptoms are improving but not resolved.  She still feels significant fatigue, sensation of still recovering from a significant event, still likely decreased appetite, taking smaller meals.    There is significant abdominal bloating with the acute illness which has improved but not resolved.    Then diagnosed with an E. coli UTI, prescribed  nitrofurantoin antibiotic 1/22/2024    Tricia emailed recently asking about post discharge management including dietary instructions as she recovers.    This was her first episode colonic diverticulitis as above.    Mild chronic constipation continues.  Has purchased Metamucil/ psyllium product but not yet started  Takes an occasional dose of a magnesium powder product which helps a bit.  Milk of magnesia has been very effective taking it at least twice in the past month  Aloe vera laxative did not help    Continues management for thyroid disease.  Currently taking [place] Thyroid 45 mg/day.    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  Her mother was suffering worsening dysphagia.  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  Her mother deferred aggressive treatment at age 85 and passed away soon after.    Tricia returns today to meet me and discuss her family history of gastric cancer and undergoing her first screening colonoscopy examination.    No previous colonoscopy examination.    Tricia's health history is significant for brain surgery at age 38.  She suffered seizures before and after that surgery.  Sounds like she has done very well past 16 years with no seizure activity for many years.  Seizure history had her very concerned about risks of anesthesia.    Family history is significant for the above history of infiltrating gastric cancer in her mother at age 85, and her father in his 40s, apparently resected and then recurred leading to his death.    On review of systems, Ms. Danielson describes occasional abdominal bloating.  She has tried dietary changes, but  no consistent triggers identified.  She describes rare GERD symptoms with heavy meals.  She denies rectal bleeding or change in bowel patterns.    Never smoker, minimal alcohol history.    She has a birthday tomorrow.  Preparing for her son's upcoming wedding on June 11, 2022.    No previous colonoscopy examination.    Wt Readings from Last 20 Encounters:   01/29/24 107 lb (48.5 kg)   01/08/24 105 lb 6.4 oz (47.8 kg)   01/04/24 107 lb (48.5 kg)   01/03/24 108 lb 3.2 oz (49.1 kg)   12/25/23 115 lb (52.2 kg)   12/12/23 114 lb 3.2 oz (51.8 kg)   11/15/23 113 lb (51.3 kg)   09/12/23 116 lb (52.6 kg)   06/27/23 116 lb (52.6 kg)   05/11/23 117 lb (53.1 kg)   03/22/23 116 lb (52.6 kg)   10/24/22 116 lb (52.6 kg)   04/21/22 111 lb 3.2 oz (50.4 kg)   03/16/22 112 lb (50.8 kg)   07/30/21 110 lb (49.9 kg)   06/09/20 114 lb (51.7 kg)   06/24/19 107 lb (48.5 kg)   04/19/19 107 lb (48.5 kg)   12/07/18 104 lb (47.2 kg)   11/17/18 105 lb (47.6 kg)         Previous EGD examination: n  Previous colonoscopy(ies): n    [facility]    Review of Systems    ====================  8/17/2017 : CT UROGRAM (W+WO)  (CPT=74178)       COMPARISON: None.       INDICATIONS: Gross hematuria.       TECHNIQUE: Multidetector CT images of the abdomen and pelvis were obtained without and with non-ionic intravenous contrast material.       FINDINGS:   LUNG BASES: The heart is normal in size. There is no visible pulmonary or pleural disease.   KIDNEYS:   No renal or ureteral calculi are identified. There is no hydronephrosis or hydroureter. No perinephric or periureteric fat stranding is appreciated. No suspicious enhancing renal lesions are evident. The ureters are normal in course and caliber without visualized filling defects on excretory phase imaging. There is a 2.5 x 2.4 cm simple cyst arising from the inferior pole of the left kidney.   URINARY BLADDER: No visible calculus or focal wall thickening.     LIVER: There is subcentimeter hypodense lesions within  the right hepatic dome and left lobe (series 8, images 16 and 17). The left lobe lesion appears to demonstrate filling on delayed phase while the right lobe lesion remains hyperattenuating on delayed phase. No enlargement, atrophy, abnormal density, or additional significant focal lesion is identified.     BILIARY: The gallbladder is present.   PANCREAS: No lesion, fluid collection, ductal dilatation, or atrophy.     SPLEEN: No enlargement.  Inferior splenule noted.   ADRENALS:   No defined mass or abnormal enlargement.     GI/MESENTERY: There is no evidence of bowel obstruction.  A normal caliber appendix is seen without inflammatory manifestations. There is an incidentally noted small bowel-small bowel intussusception in the left upper quadrant (series 8, image 59).   Scattered colonic diverticula are present in the sigmoid colon. There is no colonic wall thickening or pericolonic fat stranding.   PELVIC NODES: No lymphadenopathy.     PELVIC ORGANS: No visible mass. Pelvic organs appropriate for patient age.  Deep pelvic calcifications likely represent phleboliths.     VASCULATURE:   No aneurysm is detected. Prominence of the left ovarian vein is noted.   RETROPERITONEUM: No mass or lymphadenopathy is apparent.     BONES:   No significant bony lesion or fracture.   ABDOMINAL WALL: There is a minute fat containing umbilical hernia.   OTHER: No free air or fluid is seen in the abdomen or pelvis.         Dictated by (CST): Randall Sampson MD on 8/17/2017 at 9:48           CONCLUSION:   1. No evidence of nephroureterolithiasis, obstructive uropathy, suspicious renal mass, or renal collecting system abnormality. Simple left lower pole renal cyst.   2. Diverticulosis.   3. Two subcentimeter hypodense lesions in the liver, incompletely characterized, but likely benign cysts/hemangiomas.   4. Incidentally noted small bowel-small bowel intussusception in the left upper quadrant, a finding that is typically of no clinical  significance.   5. Prominent left ovarian vein, as can be seen in the setting of pelvic congestion syndrome.   6. Lesser incidental findings as above.           =======================  12/25/2023: CT ABDOMEN PELVIS IV CONTRAST NO ORAL (ER)     COMPARISON: None.     INDICATIONS: Abdominal Pain- RUQ and RLQ     TECHNIQUE: CT images of the abdomen and pelvis were obtained with non-ionic intravenous contrast material.      FINDINGS:     LUNG BASES:  The visualized heart is normal in size.  Lung bases are unremarkable.     LIVER:  The liver is diffusely hypoattenuating, compatible with diffuse hepatic steatosis.  Subcentimeter hypodensity in the left hepatic lobe is too small to characterize (series 2, image 27).     GALLBLADDER: Present small amount of fluid surrounding gallbladder present.  This could be reactive related to extensive adjacent inflammatory change.     BILIARY: No ductal dilation.     SPLEEN: Not enlarged.  Left upper quadrant splenule is present.     PANCREAS: Unremarkable     ADRENALS: Unremarkable.     KIDNEYS: No hydronephrosis. No obstructing calculi.  Left lower pole renal cyst is noted.       BOWEL:   There is diffuse wall thickening involving the midportion of the ascending colon.  There are numerous ascending colon diverticula.  There is a large area of phlegmonous inflammatory change with internal air adjacent to the thickened portion of the ascending colon at the hepatic flexure, which measures 6.2 by 6.2 x 4.6 centimeters (series 2, image 65 and series 5, image 25).  There is a tract like connection from this collection to the ascending colon (series 5, image 21 and series 6, image 78).     The appendix is unremarkable.     AORTA/VASCULAR:   There is no abdominal aortic aneurysm.  The SMV is not seen to be well opacified, below the confluence with the splenic vein.  There is no discrete filling defects seen however.  Finding may be secondary to timing of contrast/mixing.     RETROPERITONEUM/  MESENTERY:  There is mesenteric stranding centered on a right upper quadrant and extending to the midline.     URINARY BLADDER:  Mildly distended.  Diffuse apparent wall thickening, which can be exaggerated by underdistention.     PELVIC NODES: No enlarged adenopathy.       PELVIC ORGANS: Pelvic organs appropriate for patient age.       BONES:   No suspicious osseous finding.  Degenerative changes, with vacuum disc phenomena at L5-S1.  Mild levoscoliosis.     ABDOMINAL WALL: No suspicious soft tissue finding.        CONCLUSION:  Perforated colitis involving ascending colon near the hepatic flexure, likely due to perforated diverticulitis.  Phlegmonous collection predominantly of fat stranding with some air adjacent to the hepatic flexure measuring 6.2 centimeters.  There appears to be a tract extending from this collection to colon at the hepatic flexure.     Extensive ascending colon diverticula.     Normal appendix.     Small amount of fluid tracking adjacent to the gallbladder is favored to be reactive, related to inflammatory findings in the right upper quadrant related to colonic perforation.      Dictated by (CST): Yoly Ventura MD on 12/25/2023 at 3:28 PM        ======    12/28/2023: CT ABDOMEN + PELVIS (CONTRAST ONLY) (CPT=74177)     COMPARISON: [facility], CT ABDOMEN PELVIS IV CONTRAST NO ORAL (ER), 12/25/2023, 2:55 PM.     INDICATIONS: rt sided diverticular abscess     TECHNIQUE: CT images of the abdomen and pelvis were obtained with non-ionic intravenous contrast material.      FINDINGS:  LIVER: 1 cm low-attenuation lesion within the left hepatic lobe is too small to characterize but is unchanged.  GALLBLADDER: Normal wall thickness.  No pericholecystic fluid.  BILIARY: No intra-or extrahepatic biliary ductal dilation.  SPLEEN: Unremarkable  PANCREAS: The pancreas enhances symmetrically. No ductal dilation.  ADRENALS: Unremarkable  KIDNEYS: The kidneys enhance symmetrically. There is no  hydronephrosis.    AORTA/VASCULAR:   Normal.  No aneurysm or dissection.  RETROPERITONEUM: There are scattered subcentimeter nonspecific retroperitoneal lymph nodes.  BOWEL: Again visualized is wall thickening seen involving the hepatic flexure with adjacent inflammation.  This has significantly improved but not entirely resolved since 12/25/2023.  There are diverticula seen adjacent to the hepatic flexure.  There is  no extraluminal collection. The appendix is normal. There are no inflammatory changes within the right lower quadrant. Small hiatal hernia with wall thickening at the gastroesophageal junction.  Focal loop of intussusception is seen within the left upper quadrant seen best on series 2, image 44 and series 5, image 27.  This is new since the prior exams.  Remainder of the bowel is otherwise unremarkable without dilation or wall thickening.  MESENTERY: Normal.  No mass or hernia.    PELVIS: No enlarged mass or adenopathy. No bladder wall thickening.  BONES:   There is mild degenerative disease of the thoracic and lumbar spine.  Mild degenerative changes are seen within the sacroiliac joints and pubic symphysis.  Mild degenerative changes are seen in the bilateral hips.  LUNG BASES: No visible pulmonary or pleural disease.        CONCLUSION:     Acute diverticulitis of the hepatic flexure has significantly improved but not entirely resolved since the prior exams.     Intussusception within the left upper quadrant involving loops of jejunum is new since the prior exam.  This is likely incidental.  No obstruction.     No extraluminal collection or extraluminal contrast.     No abscess.     If not recently performed, following resolution of patient's symptoms a colonoscopy should be performed to exclude an underlying mass.     Pandiverticulosis.     Multiple other incidental findings as described in the body of the report.             Dictated by (CST): Gagandeep Lara MD on 12/28/2023 at 12:42 PM           =======================    [facility]        EGD AND COLONOSCOPY PROCEDURE REPORTS:     DATE OF PROCEDURE:  8/18/2022     PCP: Emanuel Whatley MD     PREOPERATIVE DIAGNOSIS:  Family history gastric cancer, GERD, abdominal bloating, screening colonoscopy examination     POSTOPERATIVE DIAGNOSIS:  See impression.     SURGEON:  Augie Dennison M.D.     SEDATION:    MAC anesthesia provided by the Anesthesia Service.  MAC anesthesia requested due to anticipated intolerance of the EGD and colonoscopy examination under safe doses conscious sedation medications     Estimated blood loss: none/insignificant        EGD PROCEDURE:    After the nature and risks of EGD and colonoscopy examinations under MAC anesthesia were discussed with the patient and all questions answered, informed consent was obtained.  The patient was sedated as above.       Once sedated, the Olympus adult diagnostic gastroscope was placed in the patient's mouth and advanced under direct visualization through the oropharynx into the esophagus, on down through the stomach and pylorus to the duodenal bulb and second portions of the duodenum.  Retroflexion was performed in the stomach.     EGD FINDINGS:    Esophagus and GE junction: Normal esophagus and GE junction, Z-line.     No hiatal hernia on forward or retroflexed views.     Stomach: Clear secretions present.  Mild inflammatory changes in gastric mucosa with patchy erythema in the gastric antrum.  No mucosal erosions.  No mucosal nodules or irregularities.  Numerous random biopsies obtained in a regional fashion of the gastric antrum, gastric body, gastric fundus.     Duodenum: Clear secretions present.      COLONOSCOPY PROCEDURE:    The patient was repositioned for colonoscopy examination.  Additional sedation given.  Digital rectal exam was performed, which showed no masses.  The Olympus pediatric video colonoscope was advanced under direct visualization through the  entire length of the colon to the cecum and terminal ileum.  Unable to perform retroflex exam up the ascending colon due to looping; therefore several trips completed from the base of the cecum to the hepatic flexure.  Cecum was confirmed by landmarks, including appendiceal orifice, cecal trifold, ileocecal valve.  Retroflexion was performed in the rectum.     The quality of the prep was excellent.     COLONOSCOPY FINDINGS:  Moderate severity diverticulosis of the ascending, descending, sigmoid colon.  Small internal hemorrhoids.     IMPRESSION:  Normal esophagus and GE junction.  Mild nonspecific gastritis changes seen only on EGD examination today.  Numerous random gastric mucosal biopsies obtained as above.  Moderate severity diverticulosis of the ascending, descending, sigmoid colon.  Small internal hemorrhoids.     RECOMMENDATIONS:  Followup above gastric mucosal biopsy results.  High-fiber diet.  See my recent office consultation regarding discussion of more aggressive screening for familial gastric cancer including blood testing, genetic testing.  Repeat colonoscopy examination in 10 years, or as per family history moving forward.      PATH:    A. Gastric antrum; biopsy:  Fragments of gastric mucosa with mild inactive chronic inflammation.  Diff Quik stain (with appropriate control reactivity) is negative for H pylori microorganisms.     B. Gastric body; biopsy:  Fragments of gastric mucosa with mild inactive chronic inflammation.  Diff Quik stain (with appropriate control reactivity) is negative for H pylori microorganisms.     C. Gastric fundus; biopsy:  Fragments of gastric mucosa with mild inactive chronic inflammation.  Diff Quik stain (with appropriate control reactivity) is negative for H pylori microorganisms.         Current Outpatient Medications   Medication Sig Dispense Refill    nitrofurantoin monohydrate macro 100 MG Oral Cap Take 1 capsule (100 mg total) by mouth 2 (two) times daily for 7 days.  14 capsule 0    Turmeric (QC TUMERIC COMPLEX OR) Take 1 tablet by mouth daily.      Multiple Vitamins-Minerals (MULTI-VITAMIN/MINERALS) Oral Tab Take 1 tablet by mouth daily.      thyroid (ARMOUR THYROID) 15 MG Oral Tab Take 3 tablets (45 mg total) by mouth daily. Take 1 tablet 30-60min before food and other meds/supplements 270 tablet 1    Cetirizine-Pseudoephedrine ER (ZYRTEC-D ALLERGY & CONGESTION) 5-120 MG Oral Tablet 12 Hr Take 1 tablet by mouth 2 (two) times daily. 24 tablet 3    Probiotic Product (PROBIOTIC DAILY OR) Take by mouth daily.      amoxicillin clavulanate 875-125 MG Oral Tab Take 1 tablet by mouth 2 (two) times daily. 20 tablet 0    HYDROcodone-acetaminophen 5-325 MG Oral Tab Take 1-2 tablets by mouth every 4 (four) hours as needed for Pain. (Patient not taking: Reported on 1/8/2024) 10 tablet 0    estradiol 0.1 MG/GM Vaginal Cream Place 0.5 g vaginally daily for 14 days, THEN 0.5 g twice a week. (Patient not taking: Reported on 1/3/2024) 1 each 2    thyroid (ARMOUR THYROID) 15 MG Oral Tab 1 tablet 30-60min before food and other meds/supplements. 90 tablet 2         Allergies:No Known Allergies  Imaging: No results found.       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  Her mother was suffering worsening dysphagia.  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  Her mother deferred aggressive treatment at age 85 and passed away soon after.    Tricia's health history is significant for brain surgery at age 38.  She suffered seizures before and after that surgery.  Sounds like she has done very well past 16 years with no seizure activity for many years.   Seizure history had her very concerned about risks of anesthesia.    Family history is significant for the above history of infiltrating gastric cancer in her mother at age 85, and her father in his 40s, apparently resected and then recurred leading to his death.    Reassuring EGD and colonoscopy examinations 8/18/2022.  Mild gastritis, bland gastric biopsies, no colon polyps.    Never smoker, minimal alcohol history.    Hospitalized for severe atypical ascending colon diverticulitis with phlegmon, some free air December 2023.  Rapid response to broad-spectrum antibiotics, then Augmentin antibiotic.    Returns for follow-up 1/29/2024 with multiple questions about follow-up and next steps.    Suggest:    Recent atypical ascending colon diverticulitis event with likely contained perforation, phlegmon     CT scan 12/25/2023 described \"diffuse wall thickening involving the midportion of the ascending colon.  There are numerous ascending colon diverticula.  There is a large area of phlegmonous inflammatory change with internal air adjacent to the thickened portion of the ascending colon at the hepatic flexure, which measures 6.2 by 6.2 x 4.6 centimeters (series 2, image 65 and series 5, image 25).  There is a tract like connection from this collection to the ascending colon (series 5, image 21 and series 6, image 78).\"  Rapid improvement on broad-spectrum IV antibiotics, then discharged on Augmentin antibiotic 12/29/2023  With a normal colonoscopy exam of that area showing only ascending colon and hepatic flexure diverticulosis August 2022, we have a low suspicion for perforated colonic malignancy.  Nonetheless, would continue repeating CT scans to document resolution of these findings.  Decisionmaking on whether to repeat colonoscopy examination as per CT findings.  CT scan ordered today  Today we discussed avoidance of triggers for diverticulitis, mainly the popcorn, and starting daily bowel regimen, Metamucil and  magnesium powder for now    Family history of gastric cancer in both parents, father in his fifth decade  Mother's proximal gastric cancer at age 85 was a subtle infiltrating type as per operative reports and description above  Unclear how much information will be available on father's diagnosis and treatment  Nearly normal EGD examination with gastric biopsies August 2022  Consideration for surveillance EGD examinations every 2-3 years  Previously I discussed screening for germline mutations in the E-cadherin gene, CDH1.  I explained extreme significance of finding this mutation if she test positive, consideration for total gastrectomy if she test positive.  We discussed referral for genetic counseling.  Ms. Danielson will consider.    2.  Mild GERD symptoms, diet related  Minimal concerns; no consistent OTC or prescription antacid use    3.  Mild chronic constipation, abdominal bloating     Likely diet related but no consistent triggers identified; tolerable  Aloe vera did not previously relieve mild constipation  Milk of magnesia very effective but not magnesium powder  Worsened with recent diverticulitis event December 2023-January 2024  Absolute importance of following a tolerable daily bowel regimen discussed on follow-up visit 1/29/2024 as above; current plan is to start Metamucil psyllium supplement and magnesium powder    4.  Colon cancer screening, average risk  No colon polyps on colonoscopy examination August 2022  Repeat colonoscopy or other colon cancer screening by August 2032            Prior to this encounter, I spent over 10 minutes preparing for the visit, including reviewing documents from other specialties as well as from PCP and going over test results. During and after the face to face encounter, I spent an additional 65 minutes discussing the above and counseling this patient, reviewing chart notes and data with patient and composing this note.       Digital transcription software was  utilized to produce this note. The note was proofread for content only. Typographical errors may remain.      Meds This Visit:  Requested Prescriptions      No prescriptions requested or ordered in this encounter       Imaging & Referrals:  None       ID#[id number]   No

## 2024-02-03 NOTE — DISCHARGE NOTE PROVIDER - CARE PROVIDERS DIRECT ADDRESSES
,DirectAddress_Unknown,[email or web address] Principal Discharge DX:	Breathing difficulty

## 2024-02-06 NOTE — DISCHARGE NOTE NURSING/CASE MANAGEMENT/SOCIAL WORK - NSDCPEFALRISK_GEN_ALL_CORE
ANDREE/MICHAEL Discharge Plan  Informed patient is ready for discharge.  Patient to be picked up by [facility] wheelchair van at 11AM   . Patient/interested person has been counseled for post hospitalization care.  Patient agrees and understands goals and plan. Initial implementation of the patient’s discharge plan has been arranged, including any devices/equipment needed for discharge. Discharge plan communicated to MD, RN, KIKA, MICHAEL, ANDREE, and Receiving Facility/Agency.    Patient’s discharge destination is Rehabilitation/Skilled Care. Patient will be discharged to [facility] for [facility], and transported by [facility] wheelchair van at 11AM today. [facility] uses [facility] and RN to RN report can bt given at [phone].    Andree faxed DC paperwork to [phone].  Patient and facility aware, case closed upon DC.     Radha Johnson APSW, SAC-IT      Selected Continued Care - Admitted Since 2/2/2024       Destination  Coordination complete.      Service Provider Selected Services Address Phone Fax    [facility] [address], [facility] [address] [phone]                       Patient information on fall and injury prevention

## 2024-02-09 RX ORDER — PREDNISONE 5 MG/1
5 TABLET ORAL
Qty: 30 | Refills: 3 | Status: ACTIVE | COMMUNITY
Start: 1900-01-01 | End: 1900-01-01

## 2024-02-21 ENCOUNTER — APPOINTMENT (OUTPATIENT)
Dept: VASCULAR SURGERY | Facility: CLINIC | Age: 74
End: 2024-02-21

## 2024-03-05 NOTE — DISCHARGE NOTE ADULT - WARFARIN/COUMADIN - POTENTIAL FOR ADVERSE DRUG REACTIONS AND INTERACTIONS
[facility] INPATIENT ENCOUNTER  PHYSICAL MEDICINE AND REHABILITATION  Progress Note    Treatment Team: Treatment Team: Attending Provider: Erick Israel MD; Consulting Physician: Nate Muñoz; Consulting Physician: Donnie Atkinson MD; Consulting Physician: Ilene Lackey, PHD; Consulting Physician: Bello Cruz MD; [facility]/Nursing Assistant: Kain Wood; Speech Language Pathologist: Amber Turner, BRANDAN; : Willow Gu RN; [facility]/Nursing Assistant: Cande Barriga [facility]; Respiratory Therapist: Cat Godoy, AMBROSIO; Registered Nurse: Rachel Godinez RN  Primary Care Physician: Lucia Méndez MD    Chief Complaint: Patient requires admission to acute inpatient rehabilitation 2/2 impaired mobility, self-care, transfers, and ADLs due to bilateral left greater than right thalamic infarct status post tracheostomy PEG placement questionable cryptogenic stroke      History of Present Illness:   Rowena is a 69 year old female presenting with no past medical history who presented to [facility] ED on 2/24 via medical air transport from [place] status post bilateral thalamic stroke in early February while vacationing there.     On 2/5/2024 patient developed dizziness, expressive aphasia subsequently loss of consciousness.  She was taken by ambulance to a hospital in [place] but required transfer to a facility with an MRI machine.  Ultimately 8 hours had passed and she was not eligible for tPA.  She was intubated and ultimately required trach/PEG.    She developed HAP versus aspiration PNA and required IV antibiotics and steroids.  After stabilization, she was flown from [place] to [facility] for further care.    Patient was evaluated by neurology service.  Neurosurgery was also consulted and CV/NSG procedures were indicated. Undetermined etiology of symptoms.  No arthrosclerotic vascular risk factors.  Can consider an embolic etiology.  TTE revealed PFO.  She will need to reach out to interventional  cards for possible PFO closure if no other stroke causes identified.  She was started on ASA 81 mg.  She continues to have right-sided weakness, minimally verbal but oriented and follows commands.  She also has diminished sensation in R LE.  She remains on trach collar.      ENT was consulted for possible decannulation of trach.  Patient is not ready for decannulation at this time.  ENT will follow if patient able to tolerate capping for 48 hours without any symptoms will consider decannulation.    Hematology was consulted and hypercoagulable workup is in process.  Per recent Hematology note, without evidence of venous thromboembolic event, there is no role for therapeutic anticoagulation.  Is unclear what precipitated the neurologic event.  Otology agrees with prophylactic Lovenox.  Also agrees with aspirin and statin as started per Neurology.  Defer this management of PFO to Neurology/Cardiology.    Recent therapy performance reviewed.  Patient remains below her functional baseline with complex medical and functional needs.    She was medically optimized, cleared by the various services, and subsequently admitted to acute inpatient rehab at [facility] on March 1, 2024 to address her deficits in mobility, self-care, speech/swallowing as well as cognition.    ___________________________________________    3/2/2024: CC: Attending PM&R Evaluation,  Acute inpatient rehabilitation unit    Patient seen and examined in AM.  Sitting comfortably in wheelchair.  Was observed during SLP session.  Appeared to be tolerating PMV trial with SLP.  Was able to phonate with a soft voice with PMV in place.  She is pleased about this.  No headache, dizziness, chest pain or shortness of breath.     at bedside.  Case reviewed with patient, patient's , nursing and therapy staff.    Recent Hospitalist note reviewed.    Recent labs reviewed--BMP, CBC.    Recent vitals reviewed.  Patient afebrile.    3/04 seen and examined supine in  bed.  She is alert and cooperative.  She has a thick white coating on her tongue.  Patient was feeling fatigued this morning.  Her blood pressures running on the low side.  Patient only receiving 30 cc of water flushes every 4 hours.  Increase patient's water flushes to 200 cc every 4 hours.  Discussed with patient's  she feels a lot better with the increase in volume of the water flushes.  Patient was conferenced today see notes.       Past Medical History  Past Medical History:   Diagnosis Date    Tracheostomy in place (CMD)     Initial tracheostomy tube placed 02-13-24    Vertigo          Surgical History  Past Surgical History:   Procedure Laterality Date    Back surgery         Family History:  Family History   Problem Relation Age of Onset    Rheumatoid Arthritis Mother     Rheumatoid Arthritis Father          Social History  Patient  reports that she has never smoked. She has never used smokeless tobacco. She reports that she does not drink alcohol and does not use drugs.  Prior Living Situation:  Prior Living Situation  Information Provided By:: spouse at bedside  Type of [facility]  Home Layout: Bed/Bath upstairs, Laundry in basement, Stairs to enter without rails, Multi-level, Other (comment), 1/2 bath on main level (daughter & family live on 2nd floor; patient & spouse live at the basement with bedroom & bathroom)  # Steps to Enter: 2 (able to hold only on the right wall for support)  Rails to Enter: 0  # Steps in the Home: 28 (12 steps up the 2nd floor (patient need not go up); 16 steps to basement where patient & spouse live)  Rails in Home: 1 on right going up  Lives With: Spouse, Family, Daughter, Other (comment) (patient's daughter & her family live on the 2nd floor while patient & spouse live at the basement)  Receives Help From: Family, Other (comment) (spouse able to assist 24/7 (retired))  Transportation: Drives, Spouse, Family  Bathroom Shower/Tub: Tub/Shower  unit  Bathroom Toilet: Standard  Bathroom Accessibility: Entry Level  Home Equipment: None  Lives in a house with her family.  Caring for 2 small grandkids.  Further lives with spouse.  Has 5 children who reportedly are supportive.    Functional Status    Premorbid Functional Status:   Prior Communication and Cognition: Intact  Prior Communication/Cognition  Prior Cognition: Intact  Prior Auditory Comprehension: Intact  Prior Verbal Expression: Intact  Prior Reading: Intact  Prior Writing: Intact  Prior Memory: Intact  Prior Money Management: Intact  Prior Medication Management: Intact    Prior Level of Function: Prior to hospitalization patient was reportedly very independent and caring for 2 small grandkids.  She was driving.  Did not utilize an assistive device.  Prior Function  Pre-Morbid Modified Genevieve Scale: No symptoms at all  Prior ADL: Independent  Eating: Independent  Grooming: Independent  Bathing: Independent  Upper Body Dressing: Independent  Lower Body Dressing: Independent  Toileting: Independent  Bed Mobility: Independent  Transfers: Independent  Toilet Transfers: Independent  Tub/Shower Transfers: Independent  Ambulation in the Home: Independent  Ambulation in the Community: Independent  Steps into the Home: Modified Independent  Steps within the Home: Modified Independent  Transfer Equipment: None  Locomotion Equipment: None  Car Transfers: Independent  History of Falls in past year: No  Prior Homemaking/IADLs: Independent  Hearing: No hearing deficits  Vision: Wears glasses only for reading  Vocational: Retired  Leisure: Hobbies - No  Additional Comments: patient was completely independent as to mobilities, ADL's & IADL's prior to stroke, drives, community ambulatory without device, no history of falls, takes care of minor grandchildren    Current Functional Status: 03/04/24  Therapy performance to be updated based on upcoming therapy evaluations on the acute rehab unit as below:  Supine - Sit        None          Transfers       Sit to stand  with verbal cues; set up; with tactile cues; contact guard/touching/steadying assist    Stand to sit  set up; with verbal cues; with tactile cues; contact guard/touching/steadying assist    Stand pivot  set up; with verbal cues; with tactile cues; contact guard/touching/steadying assist          Gait and Wheelchair       Ambulation device  gait belt; hand hold    Distance 1at trial  5  2 sets during transfers to/from wheelchair & bed    Distance 2nd trial  2  2 sets forward & back    Distance 3rd trial  2  2 sets forward & back    Distance 4th trial  1  10-20 steps marching in place    Assist level  set up; with tactile cues; with verbal cues; minimal assist  initially by therapist then later by  as returnd demonstration    Wheelchair type  manual    Level surfaces distance  50ft forward + 50 ft backward    Assist level  with tactile cues; with verbal cues; with demonstration          Stairs       Assist level  not attempted due to not medically appropriate or safe          Self Cares       Oral hygiene  moderate assist    Grooming  moderate assist    Bathing  maximal assist    Upper body dressing  moderate assist    Lower body dressing  maximal assist    Footwear  minimal assist    Toileting  total assist - non-dependent    Toilet transfer  minimal assist    Tub transfer  total assist - non-dependent          Feeding       Diet  nothing by mouth    Recommendations for med admin  via feeding tube          Com/Cog       Behavior/social interaction  minimal assist (completes 75-89%)     Attention  moderate assist (completes 50-74%) ; maximal assist (completes 25-49%)     Expression verbal  maximal assist (completes 25-49%)     Auditory comprehension  minimal assist (completes 75-89%)     Memory  moderate assist (completes 50-74%) ; minimal assist (completes 75-89%)     Problem solving  maximal assist (completes 25-49%)     Safety/Judgement  severe impairment  (25-49% accuracy)             Allergies:  Patient is allergic to acetaminophen, adhesive   (environmental), dairy digestive, and penicillins.      Medications  Current Facility-Administered Medications   Medication    nystatin (MYCOSTATIN) 286269 UNIT/ML suspension 500,000 Units    docusate sodium (ENEMEEZ MINI) 283 MG rectal enema 283 mg    bisacodyl (DULCOLAX) suppository 10 mg    atorvastatin (LIPITOR) tablet 80 mg    bacitracin ointment    enoxaparin (LOVENOX) injection 40 mg    aspirin chewable 81 mg    docusate sodium-sennosides (SENOKOT S) 50-8.6 MG 2 tablet    magnesium hydroxide (MILK OF MAGNESIA) 400 MG/5ML suspension 30 mL    polyethylene glycol (MIRALAX) packet 17 g    nitrofurantoin (FURADANTIN) 25 MG/5ML suspension 100 mg        Review of Systems:   Review of Systems   Constitutional:  Negative for chills, fever and malaise/fatigue.   HENT:  Negative for congestion, hearing loss and tinnitus.    Eyes:  Negative for double vision.   Respiratory:  Negative for cough and shortness of breath.    Cardiovascular:  Negative for chest pain and leg swelling.   Gastrointestinal:  Negative for abdominal pain, heartburn, nausea and vomiting.   Genitourinary:  Negative for dysuria, frequency and urgency.   Neurological:  Positive for speech change and weakness. Negative for dizziness, sensory change, focal weakness and headaches.   Psychiatric/Behavioral:  Negative for depression.    All other systems reviewed and are negative.       Last Recorded Vitals    Vital Last Value 24 Hour Range   Temperature 98.4 °F (36.9 °C) (03/04/24 1724) Temp  Min: 98.2 °F (36.8 °C)  Max: 98.4 °F (36.9 °C)   Pulse 91 (03/04/24 1724) Pulse  Min: 91  Max: 99   Respiratory 16 (03/04/24 1724) Resp  Min: 16  Max: 18   Non-Invasive  Blood Pressure 94/57 (03/04/24 1724) BP  Min: 93/60  Max: 123/73   Pulse Oximetry 94 % (03/04/24 1724) SpO2  Min: 94 %  Max: 100 %     Vital Today Admitted   Weight 61.8 kg (136 lb 3.9 oz) (03/03/24 0500)  Weight: 59.6 kg (131 lb 6.3 oz) (03/01/24 1800)       INTAKE/OUTPUT:      Intake/Output Summary (Last 24 hours) at 3/4/2024 1828  Last data filed at 3/4/2024 1615  Gross per 24 hour   Intake 200 ml   Output --   Net 200 ml         Bowel: Stool Amount: Medium (03/04/24 0800)  Stool Occurrence: 1 (03/04/24 0800)     PVR: Bladder Scan  Reason for Scan: Post void evaluation (03/04/24 0500)  Bladder Scanned Volume (mL): 286 mL (03/04/24 0500)    Wound Documentation:  Wound Treatment and Goals (most recent)       Wound Treatment and Goals    No documentation.                   Diet: Tube Feeding Diet Osmolite 1.5 Calorie/1.5 Calorie Formula - No Fiber; Without Diet Tray; Water; Every 4 Hours; 200    PHYSICAL EXAMINATION:    Physical Exam  Constitutional:       General: She is not in acute distress.     Appearance: Normal appearance.   HENT:      Head: Normocephalic and atraumatic.      Nose: Nose normal.      Mouth/Throat:      Mouth: Mucous membranes are moist.      Pharynx: Oropharynx is clear.      Comments: Trach     Neck: Normal range of motion and neck supple.   Eyes:      Extraocular Movements: Extraocular movements intact.      Pupils: Pupils are equal, round, and reactive to light.   Cardiovascular:      Rate and Rhythm: Normal rate and regular rhythm.      Pulses: Normal pulses.      Heart sounds: Normal heart sounds.   Pulmonary:      Effort: Pulmonary effort is normal. No respiratory distress.      Breath sounds: Normal breath sounds. No stridor. No wheezing, rhonchi or rales.      Comments: Trach in place.  No respiratory distress.  Appeared to be tolerating PMV trial during SLP session 3/2.  Abdominal:      General: Abdomen is flat. Bowel sounds are normal. There is no distension.      Tenderness: There is no abdominal tenderness.      Comments: PEG in place.  Visualized.  No signs of infection or drainage   Musculoskeletal:         General: No swelling or tenderness. Normal range of motion.      Right lower  leg: No edema.      Left lower leg: No edema.      Comments: Calves are nontender--stable   Skin:     General: Skin is warm and dry.   Neurological:      General: No focal deficit present.      Mental Status: She is alert and oriented to person, place, and time.      Sensory: No sensory deficit.      Motor: Weakness present.      Comments: A& O x3, able to mouth answers and follow commands appropriately.   No facial droop  Bilateral upper and lower extremities with reasonable gross strength in the 4-4+/5 range.  Sensation seems to be intact to lower extremities.   Proprioception to maneuver is intact bilaterally.    Pt is unable to actually phonate due to presence of trach.  May require some increased processing time.  Neuro/strength exam appears similar to previously recorded on 3/2.  Was observed in therapy sessions.  Participatory.   Psychiatric:         Mood and Affect: Mood normal.         Behavior: Behavior normal.      Comments: Overall pleasant, smiling at times.           Labs:   Recent Labs   Lab 03/02/24  0522 03/01/24  0518 02/29/24  0541   WBC 9.8 7.5 7.7   RBC 3.83* 3.66* 3.70*   HGB 11.9* 11.5* 11.5*   HCT 36.1 35.0* 35.6*   MCV 94.3 95.6 96.2   MCH 31.1 31.4 31.1   MCHC 33.0 32.9 32.3   RDWCV 12.5 12.5 12.4    357 366   TLYMPH 18 24 22         Recent Labs   Lab 03/03/24  0526 03/02/24  0522 02/29/24  0541   SODIUM 143 141 139   CHLORIDE 110 108 110   BUN 13 14 11   POTASSIUM 3.9 4.0 3.9   GLUCOSE 129* 136* 140*   CREATININE 0.51 0.52 0.51   CALCIUM 8.7 8.7 8.8         Recent Labs   Lab 03/04/24  1723 03/01/24  1220 03/01/24  0512 03/01/24  0001 02/29/24  1749 02/29/24  1221 02/29/24  0546 02/29/24  0029   GLUB 131* 142* 134* 134* 138* 142* 144* 130*         No results found    Imaging:   No orders to display       Principal Problem:    Thalamic infarct, acute (CMD)  Active Problems:    Tracheostomy status (CMD)    Leukocytosis    Oropharyngeal dysphagia    UTI (urinary tract infection)     Impaired gait and mobility    PFO (patent foramen ovale)    Osteoporosis      * Thalamic infarct, acute (CMD)  Assessment & Plan  -Neurology consulted--Dr. Palacio  -Left greater than right thalamic infarcts, likely due to occlusion of artery of Percheron variant.    -MRI brain resulted showing subacute hematoma in left thalamus with mild mass effect and surrounding edema form hypertensive encephalopathy, micro hemorrhages anterior right thalamus, several cortical infarcts in right cerebellar hemisphere  -Undetermined etiology of symptoms.  No arthrosclerotic vascular risk factors.  Can consider embolic etiology.  -Petechial hemorrhagic transformation of subacute stroke, asymptomatic.  -Continue ASA, statin  -Hematology consulted for hypercoagulable workup  -Event monitor on DC  -Follow-up with neurology in 2 months  3/2: No new neurosymptoms.  Was observed in therapy sessions.  Participatory.  Discussed with patient, patient's , nursing and therapy staff.  -As mentioned, neurology recommends outpatient interventional cards follow-up with eval for possible PFO closure if no other cause for stroke identified.   -Continue to monitor neurologic, hemodynamic and functional status closely  -Continue rehabilitation program as tolerated below including PT, OT as well as SLP.  3/04 fatigued in  morning with low blood pressure symptoms resolve with increased volume of water flushes    PFO (patent foramen ovale)  Assessment & Plan  - MYRA showing PFO 2/28/24  -Neurology recommending outpatient interventional cards evaluation for possible PFO closure if no other stroke cause identified   --Discussed with patient and family on 3/1  -Monitor    Impaired gait and mobility  Assessment & Plan  3/4: Participatory in recent therapies.  PT/OT evaluate and treat as tolerated on the acute rehab unit  Fall precautions.  Reviewed with patient who appeared to indicate understanding  Monitor for any change in neurologic, hemodynamic  and/or functional status    UTI (urinary tract infection)  Assessment & Plan  -Urine culture growing >100,000 E Coli, lots of resistance per susceptibilities  - S/p bactrim 800-160 mg bid started 2/25/24 X3d but not susceptible per urine culture/susceptibility.   -2/27/24 switched to macrobid 100 mg bid X7d  Monitor for any new symptoms.  Also monitor urine output    Oropharyngeal dysphagia  Assessment & Plan  -Status post PEG tube placement on 2/13/2024 in [place]  -SLP to evaluate and treat as indicated  Dietitian following.    -3/2: Appears stable.  Patient on Osmolite 1.5 started at 10 cc an hour with goal to advance to 10 mL every 8 hours to goal rate of 45 cc/h..  Also free water flush.  -Aspiration precautions  -Monitor PEG tube site, tolerance for tube feeds  Was observed in SLP session.  PMV trials with SLP.  Continue SLP evaluation and treatment.  Discussed with patient, therapy staff and  at bedside\  03/04 patient continue on Osmolite 1.5 45 cc an hour.  Free water flushes increased to 200 cc every 4 hours.  Dietitian note from 3/04 she recommended water flushes 150 cc every 4 hours.  Will decrease to 150 cc every 4 hours tomorrow.  Patient appears to be somewhat dry today.    Leukocytosis  Assessment & Plan  -now resolved  -s/p treatment of aspiration PNA with meropenem 2/8 - 2/13  -C. difficile negative  -Pulled central line on 2/25/2024  -Follow-up blood cultures: NGTD 2/29.  -Continue to monitor accordingly  3/2: White count at 9.8.  Patient afebrile.  Continue to monitor.  Recent Labs   Lab 03/02/24  0522 03/01/24  0518 02/29/24  0541   WBC 9.8 7.5 7.7   RBC 3.83* 3.66* 3.70*   HGB 11.9* 11.5* 11.5*   HCT 36.1 35.0* 35.6*   MCV 94.3 95.6 96.2   MCH 31.1 31.4 31.1   MCHC 33.0 32.9 32.3   RDWCV 12.5 12.5 12.4    357 366   TLYMPH 18 24 22           Tracheostomy status (CMD)  Assessment & Plan  -ENT on consult--not ready for decannulation yet.  -capping trials in progress, if able to  tolerate  48 hours of continuous capping, will be eligible for decannulation.  Monitor trach site.  Monitor respiratory status closely  SLP to follow, evaluate and treat on the acute rehab unit.  ENT can follow as well as indicated preparation for decannulation future.    Osteoporosis  Assessment & Plan  Status post treatment with alendronate  Monitor      # FEN: Monitor electrolytes and renal function particularly in the setting of patient's n.p.o. status with patient on tube feeds via PEG.  Dietitian to follow on rehab unit  2/2: Sodium and potassium within normal limits.  BUN, creatinine stable.  Continue to monitor.  On tube feeds as noted.  Dietitian following.  Recent Labs   Lab 03/03/24  0526 03/02/24  0522 02/29/24  0541   SODIUM 143 141 139   CHLORIDE 110 108 110   BUN 13 14 11   POTASSIUM 3.9 4.0 3.9   GLUCOSE 129* 136* 140*   CREATININE 0.51 0.52 0.51   CALCIUM 8.7 8.7 8.8     03/04 sodium 143.  Free water flushes were increased to 200 cc every 4 hours      # Anemia: 3/2: Hemoglobin stable.  Continue to monitor accordingly.    DVT prophylaxis: 3/2: Stable.  Continue Lovenox..  Hematology is following.  Mechanical prophylaxis.  Monitor.      # Rehabilitation secondary to bilateral CVA with acute respiratory failure and dysphagia, s/p trach, PEG; Also found to have PFO.  Patient has been cleared for an acute inpatient rehabilitation program.  Case was reviewed at length with patient, patient's daughter and son-in-law at bedside.  All questions answered to the best my ability.  Case reviewed also with nursing and rehabilitation staff including rehabilitation coordinator and rehabilitation APN.  Begin therapy evaluations on March 2, 2024 including PT, OT as well as SLP.    3/2: Patient seen and examined in AM.  Sitting comfortably in wheelchair.  Was observed during SLP session.  Appeared to be tolerating PMV trial with SLP.  Was able to phonate with a soft voice with PMV in place.  She is pleased about this.   No headache, dizziness, chest pain or shortness of breath.   at bedside.  Case reviewed with patient, patient's , nursing and therapy staff.  Recent Hospitalist note reviewed.  Recent labs reviewed--BMP, CBC.  Recent vitals reviewed.  Patient afebrile.   Monitor for any change in neurologic, hemodynamic and/or functional status  Continue rehabilitation program as tolerated including PT, OT as well as SLP.    TDD: TBD.  Estimated as 3/22.  Awaiting first rehab team conference coming up early next week.    Principal Problem:    Thalamic infarct, acute (CMD)  Active Problems:    Tracheostomy status (CMD)    Leukocytosis    Oropharyngeal dysphagia    UTI (urinary tract infection)    Impaired gait and mobility    PFO (patent foramen ovale)    Osteoporosis    Team conference 03/04/2024  RN-cont inc b/b, tracheostomy , on TF goal 45 cc/hr , water flush  30 q 4 hrs   Speech-NPO, still coughing all consistency , attn mod to max , exp max  , comp min , mem min to PS and safety max  OT- grm and OH mod , UED mod , LED max , footwear min , toileting tot , toilet transfers min   PT- BM CGA , sit to stand CGA , HH CGA , amb min 5-10 ft   CM-lives with spouse supportive family , prior to admission ind ,  able to provide 24/7 care , reconference  Pysch-provide suportive counseling \    I led patient's team conference.  I discussed with patient's  at bedside         Statement Selected

## 2024-03-08 ENCOUNTER — APPOINTMENT (OUTPATIENT)
Dept: ELECTROPHYSIOLOGY | Facility: CLINIC | Age: 74
End: 2024-03-08
Payer: MEDICARE

## 2024-03-08 ENCOUNTER — NON-APPOINTMENT (OUTPATIENT)
Age: 74
End: 2024-03-08

## 2024-03-08 VITALS
OXYGEN SATURATION: 95 % | HEIGHT: 61 IN | HEART RATE: 61 BPM | SYSTOLIC BLOOD PRESSURE: 142 MMHG | BODY MASS INDEX: 49.09 KG/M2 | WEIGHT: 260 LBS | DIASTOLIC BLOOD PRESSURE: 82 MMHG

## 2024-03-08 DIAGNOSIS — Z98.890 OTHER SPECIFIED POSTPROCEDURAL STATES: ICD-10-CM

## 2024-03-08 DIAGNOSIS — Z86.79 OTHER SPECIFIED POSTPROCEDURAL STATES: ICD-10-CM

## 2024-03-08 PROCEDURE — 99214 OFFICE O/P EST MOD 30 MIN: CPT

## 2024-03-08 PROCEDURE — 93000 ELECTROCARDIOGRAM COMPLETE: CPT

## 2024-03-08 RX ORDER — DABIGATRAN ETEXILATE 150 MG/1
150 CAPSULE ORAL TWICE DAILY
Qty: 180 | Refills: 1 | Status: DISCONTINUED | COMMUNITY
Start: 2023-06-14 | End: 2024-03-08

## 2024-03-08 RX ORDER — LOSARTAN POTASSIUM 25 MG/1
25 TABLET, FILM COATED ORAL
Qty: 90 | Refills: 1 | Status: DISCONTINUED | COMMUNITY
Start: 2023-01-10 | End: 2024-03-08

## 2024-03-08 RX ORDER — WARFARIN 5 MG/1
5 TABLET ORAL DAILY
Refills: 0 | Status: ACTIVE | COMMUNITY

## 2024-03-08 NOTE — REVIEW OF SYSTEMS
[Feeling Fatigued] : not feeling fatigued [SOB] : no shortness of breath [Dyspnea on exertion] : not dyspnea during exertion [Chest Discomfort] : no chest discomfort [Lower Ext Edema] : no extremity edema [Palpitations] : palpitations [Orthopnea] : no orthopnea [Syncope] : no syncope [Dizziness] : no dizziness [Easy Bleeding] : no tendency for easy bleeding

## 2024-03-08 NOTE — DISCUSSION/SUMMARY
[FreeTextEntry1] : The patient is a pleasant 73-year-old female here for follow up today. She has a medical history of heart failure with preserved ejection fraction (LVEF 50-55%), moderate aortic stenosis, persistent atrial fibrillation, hypertension, hyperlipidemia, type 2 diabetes mellitus, chronic peripheral vascular disease, and morbid obesity (previous lap band procedure, since removed).  To summarize, the patient was hospitalized in late 2022 with worsening dyspnea on exertion and lower extremity edema. She was noted to be in atrial fibrillation at the time. An outpatient TTE had revealed deteriorating aortic stenosis, prompting consideration for (TAVR). A repeat echocardiogram showed moderate aortic stenosis, and left heart catheterization on December 20 demonstrated no significant aortic valve gradients. The patient's symptoms were felt to be disproportionate to the severity of aortic stenosis. Notably the patient had been in persistent atrial fibrillation for the past three years. A successful direct current cardioversion was performed on December 22, 2022, and she was discharged on amiodarone. The patient reported significant symptomatic improvement while in sinus rhythm which was unfortunately short lived and was followed by recurrence of atrial fibrillation. She underwent catheter ablation for atrial fibrillation on 2/13/23 (PVI, posterior box, and CTI). Amiodarone was eventually discontinued post-ablation. She wore and outpatient monitor on 5/3/23 which did not reveal any evidence of atrial fibrillation. During subsequent f/u she reported Eliquis was cost prohibitIve. Switched to Pradaxa.  Today, Ms. Kirby reports she has been doing well since last follow up. Occasionally feels palpitations lasting 1-2 seconds. Denies sustained arrhythmia recurrence. ECG reveals SB. Now maintained on warfarin due to cost. Denies significant bleeding, bruising, or falls.   Recommendation:   In summary, the patient is a 73 year old female with a history of HFpEF, moderate aortic stenosis and long-standing persistent atrial fibrillation s/p ablation in 2/2023. She continues to maintain sinus rhythm with marked improvement in symptoms since the ablation procedure. She is now on warfarin for stroke prophylaxis. She will continue metoprolol and other heart failure medications. To arrange 3 day holter for arrhythmia surveillance. If unrevealing she will continue cardiology f/u with Dr. Mayer with EP follow up on as needed basis.  Dona SAAVEDRA  [EKG obtained to assist in diagnosis and management of assessed problem(s)] : EKG obtained to assist in diagnosis and management of assessed problem(s)

## 2024-03-08 NOTE — HISTORY OF PRESENT ILLNESS
[FreeTextEntry1] : The patient is a pleasant 73-year-old female here for follow up today. She has a medical history of heart failure with preserved ejection fraction (LVEF 50-55%), moderate aortic stenosis, persistent atrial fibrillation, hypertension, hyperlipidemia, type 2 diabetes mellitus, chronic peripheral vascular disease, and morbid obesity (previous lap band procedure, since removed).  To summarize, the patient was hospitalized in late 2022 with worsening dyspnea on exertion and lower extremity edema. She was noted to be in atrial fibrillation at the time. An outpatient TTE had revealed deteriorating aortic stenosis, prompting consideration for (TAVR). A repeat echocardiogram showed moderate aortic stenosis, and left heart catheterization on December 20 demonstrated no significant aortic valve gradients. The patient's symptoms were felt to be disproportionate to the severity of aortic stenosis. Notably the patient had been in persistent atrial fibrillation for the past three years. A successful direct current cardioversion was performed on December 22, 2022, and she was discharged on amiodarone. The patient reported significant symptomatic improvement while in sinus rhythm which was unfortunately short lived and was followed by recurrence of atrial fibrillation. She underwent catheter ablation for atrial fibrillation on 2/13/23 (PVI, posterior box, and CTI). Amiodarone was eventually discontinued post-ablation. She wore and outpatient monitor on 5/3/23 which did not reveal any evidence of atrial fibrillation. During subsequent f/u she reported Eliquis was cost prohibitIve. Switched to Pradaxa.  Today, Ms. Kirby reports she has been doing well since last follow up. Occasionally feels palpitations lasting 1-2 seconds. Denies sustained arrhythmia recurrence. ECG reveals SB. Now maintained on warfarin due to cost. Denies significant bleeding, bruising, or falls.

## 2024-03-19 ENCOUNTER — RX RENEWAL (OUTPATIENT)
Age: 74
End: 2024-03-19

## 2024-03-19 RX ORDER — PREDNISONE 10 MG/1
10 TABLET ORAL DAILY
Qty: 90 | Refills: 0 | Status: ACTIVE | COMMUNITY
Start: 2022-08-23 | End: 1900-01-01

## 2024-03-27 ENCOUNTER — APPOINTMENT (OUTPATIENT)
Dept: VASCULAR SURGERY | Facility: CLINIC | Age: 74
End: 2024-03-27
Payer: MEDICARE

## 2024-03-27 VITALS — SYSTOLIC BLOOD PRESSURE: 122 MMHG | DIASTOLIC BLOOD PRESSURE: 68 MMHG | HEART RATE: 77 BPM | OXYGEN SATURATION: 97 %

## 2024-03-27 DIAGNOSIS — I89.0 LYMPHEDEMA, NOT ELSEWHERE CLASSIFIED: ICD-10-CM

## 2024-03-27 DIAGNOSIS — I87.2 VENOUS INSUFFICIENCY (CHRONIC) (PERIPHERAL): ICD-10-CM

## 2024-03-27 PROCEDURE — 99213 OFFICE O/P EST LOW 20 MIN: CPT

## 2024-04-29 ENCOUNTER — INPATIENT (INPATIENT)
Facility: HOSPITAL | Age: 74
LOS: 7 days | Discharge: HOME HEALTH | DRG: 291 | End: 2024-05-07
Attending: STUDENT IN AN ORGANIZED HEALTH CARE EDUCATION/TRAINING PROGRAM | Admitting: INTERNAL MEDICINE
Payer: MEDICARE

## 2024-04-29 VITALS
WEIGHT: 259.93 LBS | RESPIRATION RATE: 34 BRPM | OXYGEN SATURATION: 98 % | DIASTOLIC BLOOD PRESSURE: 94 MMHG | SYSTOLIC BLOOD PRESSURE: 130 MMHG | HEART RATE: 118 BPM | TEMPERATURE: 100 F | HEIGHT: 70 IN

## 2024-04-29 DIAGNOSIS — Z98.49 CATARACT EXTRACTION STATUS, UNSPECIFIED EYE: Chronic | ICD-10-CM

## 2024-04-29 DIAGNOSIS — I48.91 UNSPECIFIED ATRIAL FIBRILLATION: ICD-10-CM

## 2024-04-29 DIAGNOSIS — Z98.890 OTHER SPECIFIED POSTPROCEDURAL STATES: Chronic | ICD-10-CM

## 2024-04-29 DIAGNOSIS — Z90.49 ACQUIRED ABSENCE OF OTHER SPECIFIED PARTS OF DIGESTIVE TRACT: Chronic | ICD-10-CM

## 2024-04-29 DIAGNOSIS — K95.09 OTHER COMPLICATIONS OF GASTRIC BAND PROCEDURE: Chronic | ICD-10-CM

## 2024-04-29 LAB
ALBUMIN SERPL ELPH-MCNC: 3 G/DL — LOW (ref 3.3–5)
ALP SERPL-CCNC: 84 U/L — SIGNIFICANT CHANGE UP (ref 40–120)
ALT FLD-CCNC: 44 U/L — SIGNIFICANT CHANGE UP (ref 12–78)
ANION GAP SERPL CALC-SCNC: 7 MMOL/L — SIGNIFICANT CHANGE UP (ref 5–17)
APPEARANCE UR: CLEAR — SIGNIFICANT CHANGE UP
APTT BLD: 37.3 SEC — HIGH (ref 24.5–35.6)
AST SERPL-CCNC: 33 U/L — SIGNIFICANT CHANGE UP (ref 15–37)
BACTERIA # UR AUTO: ABNORMAL /HPF
BASOPHILS # BLD AUTO: 0.03 K/UL — SIGNIFICANT CHANGE UP (ref 0–0.2)
BASOPHILS NFR BLD AUTO: 0.6 % — SIGNIFICANT CHANGE UP (ref 0–2)
BILIRUB SERPL-MCNC: 0.6 MG/DL — SIGNIFICANT CHANGE UP (ref 0.2–1.2)
BILIRUB UR-MCNC: NEGATIVE — SIGNIFICANT CHANGE UP
BUN SERPL-MCNC: 14 MG/DL — SIGNIFICANT CHANGE UP (ref 7–23)
CALCIUM SERPL-MCNC: 8.9 MG/DL — SIGNIFICANT CHANGE UP (ref 8.5–10.1)
CAST: 0 /LPF — SIGNIFICANT CHANGE UP (ref 0–4)
CHLORIDE SERPL-SCNC: 106 MMOL/L — SIGNIFICANT CHANGE UP (ref 96–108)
CO2 SERPL-SCNC: 24 MMOL/L — SIGNIFICANT CHANGE UP (ref 22–31)
COLOR SPEC: YELLOW — SIGNIFICANT CHANGE UP
CREAT SERPL-MCNC: 0.7 MG/DL — SIGNIFICANT CHANGE UP (ref 0.5–1.3)
DIFF PNL FLD: ABNORMAL
EGFR: 91 ML/MIN/1.73M2 — SIGNIFICANT CHANGE UP
EOSINOPHIL # BLD AUTO: 0.01 K/UL — SIGNIFICANT CHANGE UP (ref 0–0.5)
EOSINOPHIL NFR BLD AUTO: 0.2 % — SIGNIFICANT CHANGE UP (ref 0–6)
FLUAV AG NPH QL: DETECTED
FLUBV AG NPH QL: SIGNIFICANT CHANGE UP
GLUCOSE BLDC GLUCOMTR-MCNC: 123 MG/DL — HIGH (ref 70–99)
GLUCOSE SERPL-MCNC: 118 MG/DL — HIGH (ref 70–99)
GLUCOSE UR QL: NEGATIVE MG/DL — SIGNIFICANT CHANGE UP
HCT VFR BLD CALC: 38.7 % — SIGNIFICANT CHANGE UP (ref 34.5–45)
HGB BLD-MCNC: 12.6 G/DL — SIGNIFICANT CHANGE UP (ref 11.5–15.5)
IMM GRANULOCYTES NFR BLD AUTO: 0.4 % — SIGNIFICANT CHANGE UP (ref 0–0.9)
INR BLD: 1.5 RATIO — HIGH (ref 0.85–1.18)
KETONES UR-MCNC: ABNORMAL MG/DL
LACTATE SERPL-SCNC: 1.3 MMOL/L — SIGNIFICANT CHANGE UP (ref 0.7–2)
LEUKOCYTE ESTERASE UR-ACNC: NEGATIVE — SIGNIFICANT CHANGE UP
LYMPHOCYTES # BLD AUTO: 0.55 K/UL — LOW (ref 1–3.3)
LYMPHOCYTES # BLD AUTO: 10.8 % — LOW (ref 13–44)
MCHC RBC-ENTMCNC: 30.4 PG — SIGNIFICANT CHANGE UP (ref 27–34)
MCHC RBC-ENTMCNC: 32.6 GM/DL — SIGNIFICANT CHANGE UP (ref 32–36)
MCV RBC AUTO: 93.3 FL — SIGNIFICANT CHANGE UP (ref 80–100)
MONOCYTES # BLD AUTO: 0.77 K/UL — SIGNIFICANT CHANGE UP (ref 0–0.9)
MONOCYTES NFR BLD AUTO: 15.1 % — HIGH (ref 2–14)
NEUTROPHILS # BLD AUTO: 3.73 K/UL — SIGNIFICANT CHANGE UP (ref 1.8–7.4)
NEUTROPHILS NFR BLD AUTO: 72.9 % — SIGNIFICANT CHANGE UP (ref 43–77)
NITRITE UR-MCNC: NEGATIVE — SIGNIFICANT CHANGE UP
NT-PROBNP SERPL-SCNC: 1882 PG/ML — HIGH (ref 0–125)
PH UR: 5.5 — SIGNIFICANT CHANGE UP (ref 5–8)
PLATELET # BLD AUTO: 277 K/UL — SIGNIFICANT CHANGE UP (ref 150–400)
POTASSIUM SERPL-MCNC: 3.7 MMOL/L — SIGNIFICANT CHANGE UP (ref 3.5–5.3)
POTASSIUM SERPL-SCNC: 3.7 MMOL/L — SIGNIFICANT CHANGE UP (ref 3.5–5.3)
PROT SERPL-MCNC: 7.3 GM/DL — SIGNIFICANT CHANGE UP (ref 6–8.3)
PROT UR-MCNC: NEGATIVE MG/DL — SIGNIFICANT CHANGE UP
PROTHROM AB SERPL-ACNC: 16.7 SEC — HIGH (ref 9.5–13)
RBC # BLD: 4.15 M/UL — SIGNIFICANT CHANGE UP (ref 3.8–5.2)
RBC # FLD: 13.6 % — SIGNIFICANT CHANGE UP (ref 10.3–14.5)
RBC CASTS # UR COMP ASSIST: 2 /HPF — SIGNIFICANT CHANGE UP (ref 0–4)
RSV RNA NPH QL NAA+NON-PROBE: SIGNIFICANT CHANGE UP
SARS-COV-2 RNA SPEC QL NAA+PROBE: SIGNIFICANT CHANGE UP
SODIUM SERPL-SCNC: 137 MMOL/L — SIGNIFICANT CHANGE UP (ref 135–145)
SP GR SPEC: 1.01 — SIGNIFICANT CHANGE UP (ref 1–1.03)
SQUAMOUS # UR AUTO: 1 /HPF — SIGNIFICANT CHANGE UP (ref 0–5)
TROPONIN I, HIGH SENSITIVITY RESULT: 16.14 NG/L — SIGNIFICANT CHANGE UP
UROBILINOGEN FLD QL: 0.2 MG/DL — SIGNIFICANT CHANGE UP (ref 0.2–1)
WBC # BLD: 5.11 K/UL — SIGNIFICANT CHANGE UP (ref 3.8–10.5)
WBC # FLD AUTO: 5.11 K/UL — SIGNIFICANT CHANGE UP (ref 3.8–10.5)
WBC UR QL: 0 /HPF — SIGNIFICANT CHANGE UP (ref 0–5)

## 2024-04-29 PROCEDURE — 36415 COLL VENOUS BLD VENIPUNCTURE: CPT

## 2024-04-29 PROCEDURE — 84100 ASSAY OF PHOSPHORUS: CPT

## 2024-04-29 PROCEDURE — 85025 COMPLETE CBC W/AUTO DIFF WBC: CPT

## 2024-04-29 PROCEDURE — 85027 COMPLETE CBC AUTOMATED: CPT

## 2024-04-29 PROCEDURE — 80048 BASIC METABOLIC PNL TOTAL CA: CPT

## 2024-04-29 PROCEDURE — 84479 ASSAY OF THYROID (T3 OR T4): CPT

## 2024-04-29 PROCEDURE — 85379 FIBRIN DEGRADATION QUANT: CPT

## 2024-04-29 PROCEDURE — 99291 CRITICAL CARE FIRST HOUR: CPT

## 2024-04-29 PROCEDURE — 82962 GLUCOSE BLOOD TEST: CPT

## 2024-04-29 PROCEDURE — 85730 THROMBOPLASTIN TIME PARTIAL: CPT

## 2024-04-29 PROCEDURE — 93005 ELECTROCARDIOGRAM TRACING: CPT

## 2024-04-29 PROCEDURE — 84480 ASSAY TRIIODOTHYRONINE (T3): CPT

## 2024-04-29 PROCEDURE — 94640 AIRWAY INHALATION TREATMENT: CPT

## 2024-04-29 PROCEDURE — 82803 BLOOD GASES ANY COMBINATION: CPT

## 2024-04-29 PROCEDURE — 97116 GAIT TRAINING THERAPY: CPT | Mod: GP

## 2024-04-29 PROCEDURE — 80053 COMPREHEN METABOLIC PANEL: CPT

## 2024-04-29 PROCEDURE — 97161 PT EVAL LOW COMPLEX 20 MIN: CPT | Mod: GP

## 2024-04-29 PROCEDURE — 85610 PROTHROMBIN TIME: CPT

## 2024-04-29 PROCEDURE — 83036 HEMOGLOBIN GLYCOSYLATED A1C: CPT

## 2024-04-29 PROCEDURE — 84443 ASSAY THYROID STIM HORMONE: CPT

## 2024-04-29 PROCEDURE — 97530 THERAPEUTIC ACTIVITIES: CPT | Mod: GP

## 2024-04-29 PROCEDURE — 93010 ELECTROCARDIOGRAM REPORT: CPT

## 2024-04-29 PROCEDURE — 83880 ASSAY OF NATRIURETIC PEPTIDE: CPT

## 2024-04-29 PROCEDURE — 71045 X-RAY EXAM CHEST 1 VIEW: CPT

## 2024-04-29 PROCEDURE — 93306 TTE W/DOPPLER COMPLETE: CPT

## 2024-04-29 PROCEDURE — P9047: CPT

## 2024-04-29 PROCEDURE — 83605 ASSAY OF LACTIC ACID: CPT

## 2024-04-29 PROCEDURE — 94760 N-INVAS EAR/PLS OXIMETRY 1: CPT

## 2024-04-29 PROCEDURE — 84436 ASSAY OF TOTAL THYROXINE: CPT

## 2024-04-29 PROCEDURE — 94660 CPAP INITIATION&MGMT: CPT

## 2024-04-29 PROCEDURE — 71045 X-RAY EXAM CHEST 1 VIEW: CPT | Mod: 26

## 2024-04-29 PROCEDURE — 84145 PROCALCITONIN (PCT): CPT

## 2024-04-29 PROCEDURE — 83735 ASSAY OF MAGNESIUM: CPT

## 2024-04-29 RX ORDER — PANTOPRAZOLE SODIUM 20 MG/1
40 TABLET, DELAYED RELEASE ORAL DAILY
Refills: 0 | Status: DISCONTINUED | OUTPATIENT
Start: 2024-04-29 | End: 2024-04-30

## 2024-04-29 RX ORDER — DEXTROSE 50 % IN WATER 50 %
15 SYRINGE (ML) INTRAVENOUS ONCE
Refills: 0 | Status: DISCONTINUED | OUTPATIENT
Start: 2024-04-29 | End: 2024-05-07

## 2024-04-29 RX ORDER — MONTELUKAST 4 MG/1
1 TABLET, CHEWABLE ORAL
Qty: 0 | Refills: 0 | DISCHARGE

## 2024-04-29 RX ORDER — LOSARTAN POTASSIUM 100 MG/1
1 TABLET, FILM COATED ORAL
Qty: 0 | Refills: 0 | DISCHARGE

## 2024-04-29 RX ORDER — GLUCAGON INJECTION, SOLUTION 0.5 MG/.1ML
1 INJECTION, SOLUTION SUBCUTANEOUS ONCE
Refills: 0 | Status: DISCONTINUED | OUTPATIENT
Start: 2024-04-29 | End: 2024-05-07

## 2024-04-29 RX ORDER — GABAPENTIN 400 MG/1
800 CAPSULE ORAL THREE TIMES A DAY
Refills: 0 | Status: DISCONTINUED | OUTPATIENT
Start: 2024-04-29 | End: 2024-05-07

## 2024-04-29 RX ORDER — LACTOBACILLUS ACIDOPHILUS 100MM CELL
1 CAPSULE ORAL
Refills: 0 | DISCHARGE

## 2024-04-29 RX ORDER — EZETIMIBE 10 MG/1
1 TABLET ORAL
Qty: 0 | Refills: 0 | DISCHARGE

## 2024-04-29 RX ORDER — CEFTRIAXONE 500 MG/1
1000 INJECTION, POWDER, FOR SOLUTION INTRAMUSCULAR; INTRAVENOUS ONCE
Refills: 0 | Status: COMPLETED | OUTPATIENT
Start: 2024-04-29 | End: 2024-04-29

## 2024-04-29 RX ORDER — MAGNESIUM OXIDE 400 MG ORAL TABLET 241.3 MG
1 TABLET ORAL
Qty: 0 | Refills: 0 | DISCHARGE

## 2024-04-29 RX ORDER — DEXTROSE 10 % IN WATER 10 %
125 INTRAVENOUS SOLUTION INTRAVENOUS ONCE
Refills: 0 | Status: DISCONTINUED | OUTPATIENT
Start: 2024-04-29 | End: 2024-05-07

## 2024-04-29 RX ORDER — GABAPENTIN 400 MG/1
1 CAPSULE ORAL
Refills: 0 | DISCHARGE

## 2024-04-29 RX ORDER — DILTIAZEM HCL 120 MG
20 CAPSULE, EXT RELEASE 24 HR ORAL ONCE
Refills: 0 | Status: COMPLETED | OUTPATIENT
Start: 2024-04-29 | End: 2024-04-29

## 2024-04-29 RX ORDER — CHLORHEXIDINE GLUCONATE 213 G/1000ML
1 SOLUTION TOPICAL
Refills: 0 | Status: DISCONTINUED | OUTPATIENT
Start: 2024-04-29 | End: 2024-04-30

## 2024-04-29 RX ORDER — WARFARIN SODIUM 2.5 MG/1
5 TABLET ORAL ONCE
Refills: 0 | Status: COMPLETED | OUTPATIENT
Start: 2024-04-29 | End: 2024-04-29

## 2024-04-29 RX ORDER — DILTIAZEM HCL 120 MG
30 CAPSULE, EXT RELEASE 24 HR ORAL ONCE
Refills: 0 | Status: COMPLETED | OUTPATIENT
Start: 2024-04-29 | End: 2024-04-29

## 2024-04-29 RX ORDER — METOPROLOL TARTRATE 50 MG
50 TABLET ORAL DAILY
Refills: 0 | Status: DISCONTINUED | OUTPATIENT
Start: 2024-04-29 | End: 2024-05-02

## 2024-04-29 RX ORDER — CHOLECALCIFEROL (VITAMIN D3) 125 MCG
1 CAPSULE ORAL
Refills: 0 | DISCHARGE

## 2024-04-29 RX ORDER — ZOLPIDEM TARTRATE 10 MG/1
1 TABLET ORAL
Qty: 0 | Refills: 0 | DISCHARGE

## 2024-04-29 RX ORDER — CEFTRIAXONE 500 MG/1
1000 INJECTION, POWDER, FOR SOLUTION INTRAMUSCULAR; INTRAVENOUS ONCE
Refills: 0 | Status: DISCONTINUED | OUTPATIENT
Start: 2024-04-29 | End: 2024-04-29

## 2024-04-29 RX ORDER — FUROSEMIDE 40 MG
40 TABLET ORAL DAILY
Refills: 0 | Status: DISCONTINUED | OUTPATIENT
Start: 2024-04-29 | End: 2024-05-07

## 2024-04-29 RX ORDER — CHOLESTYRAMINE 4 G/9G
1 POWDER, FOR SUSPENSION ORAL
Refills: 0 | DISCHARGE

## 2024-04-29 RX ORDER — WARFARIN SODIUM 2.5 MG/1
1 TABLET ORAL
Refills: 0 | DISCHARGE

## 2024-04-29 RX ORDER — ENOXAPARIN SODIUM 100 MG/ML
120 INJECTION SUBCUTANEOUS ONCE
Refills: 0 | Status: COMPLETED | OUTPATIENT
Start: 2024-04-29 | End: 2024-04-29

## 2024-04-29 RX ORDER — METFORMIN HYDROCHLORIDE 850 MG/1
1 TABLET ORAL
Qty: 0 | Refills: 0 | DISCHARGE

## 2024-04-29 RX ORDER — INSULIN LISPRO 100/ML
VIAL (ML) SUBCUTANEOUS EVERY 6 HOURS
Refills: 0 | Status: DISCONTINUED | OUTPATIENT
Start: 2024-04-29 | End: 2024-04-30

## 2024-04-29 RX ORDER — ACETAMINOPHEN 500 MG
1000 TABLET ORAL ONCE
Refills: 0 | Status: COMPLETED | OUTPATIENT
Start: 2024-04-29 | End: 2024-04-29

## 2024-04-29 RX ORDER — AZITHROMYCIN 500 MG/1
500 TABLET, FILM COATED ORAL ONCE
Refills: 0 | Status: COMPLETED | OUTPATIENT
Start: 2024-04-29 | End: 2024-04-29

## 2024-04-29 RX ORDER — ONDANSETRON 8 MG/1
4 TABLET, FILM COATED ORAL ONCE
Refills: 0 | Status: COMPLETED | OUTPATIENT
Start: 2024-04-29 | End: 2024-04-29

## 2024-04-29 RX ORDER — ATORVASTATIN CALCIUM 80 MG/1
10 TABLET, FILM COATED ORAL AT BEDTIME
Refills: 0 | Status: DISCONTINUED | OUTPATIENT
Start: 2024-04-29 | End: 2024-05-07

## 2024-04-29 RX ORDER — DILTIAZEM HCL 120 MG
25 CAPSULE, EXT RELEASE 24 HR ORAL ONCE
Refills: 0 | Status: COMPLETED | OUTPATIENT
Start: 2024-04-29 | End: 2024-04-29

## 2024-04-29 RX ORDER — FUROSEMIDE 40 MG
40 TABLET ORAL ONCE
Refills: 0 | Status: COMPLETED | OUTPATIENT
Start: 2024-04-29 | End: 2024-04-29

## 2024-04-29 RX ORDER — AMIODARONE HYDROCHLORIDE 400 MG/1
0 TABLET ORAL
Qty: 0 | Refills: 1 | DISCHARGE

## 2024-04-29 RX ORDER — DEXTROSE 50 % IN WATER 50 %
25 SYRINGE (ML) INTRAVENOUS ONCE
Refills: 0 | Status: DISCONTINUED | OUTPATIENT
Start: 2024-04-29 | End: 2024-05-07

## 2024-04-29 RX ORDER — SODIUM CHLORIDE 9 MG/ML
1000 INJECTION, SOLUTION INTRAVENOUS
Refills: 0 | Status: DISCONTINUED | OUTPATIENT
Start: 2024-04-29 | End: 2024-05-07

## 2024-04-29 RX ORDER — METOPROLOL TARTRATE 50 MG
50 TABLET ORAL DAILY
Refills: 0 | Status: DISCONTINUED | OUTPATIENT
Start: 2024-04-29 | End: 2024-04-29

## 2024-04-29 RX ADMIN — Medication 40 MILLIGRAM(S): at 19:41

## 2024-04-29 RX ADMIN — Medication 30 MILLIGRAM(S): at 19:47

## 2024-04-29 RX ADMIN — AZITHROMYCIN 255 MILLIGRAM(S): 500 TABLET, FILM COATED ORAL at 19:45

## 2024-04-29 RX ADMIN — Medication 400 MILLIGRAM(S): at 20:41

## 2024-04-29 RX ADMIN — ONDANSETRON 4 MILLIGRAM(S): 8 TABLET, FILM COATED ORAL at 19:49

## 2024-04-29 RX ADMIN — Medication 20 MILLIGRAM(S): at 19:40

## 2024-04-29 RX ADMIN — Medication 25 MILLIGRAM(S): at 23:45

## 2024-04-29 RX ADMIN — GABAPENTIN 800 MILLIGRAM(S): 400 CAPSULE ORAL at 23:45

## 2024-04-29 RX ADMIN — CEFTRIAXONE 1000 MILLIGRAM(S): 500 INJECTION, POWDER, FOR SOLUTION INTRAMUSCULAR; INTRAVENOUS at 20:45

## 2024-04-29 NOTE — ED ADULT NURSE NOTE - DRUG PRE-SCREENING (DAST -1)
Pharmacy would like MA to call pharmacy back and clarify     omeprazole 20 MG tablet   Statement Selected

## 2024-04-29 NOTE — H&P ADULT - NSHPPHYSICALEXAM_GEN_ALL_CORE
General: Elderly obese woman tachypneic on NIV  HEENT: Pupils equal, reactive to light.  Symmetric.  PULM: Coarse breath sounds b/l with rhonchi. Tachypneic in 40s.   CVS: Irregularly irregular  ABD: Soft, nondistended, nontender  EXT: b/l LE edema with chronic skin changes b/l LE  NEURO: Alert, oriented. Sensation intact. No focal deficits.

## 2024-04-29 NOTE — ED PROVIDER NOTE - CLINICAL SUMMARY MEDICAL DECISION MAKING FREE TEXT BOX
EKG, CXR, labs, IV ABX, Lasix, cardizem, monitor and admit EKG, CXR, labs, IV ABX, Lasix, cardizem, monitor and admit.  Pt given cardizem 20 IV, cardizem 30PO, lasix 40IV, ceftriaxone and azithromycin EKG, CXR, labs, IV ABX, Lasix, cardizem, monitor and admit.  Pt given cardizem 20 IV, cardizem 30PO, lasix 40IV, ceftriaxone and azithromycin.Labs show no significant leukocytosis,   consistent with CHF,  patient positive for influenza A.  Admitted to Dr. Delatorre ICU attending for acute hypoxic respiratory failure, atrial fibrillation with RVR, CHF exacerbation

## 2024-04-29 NOTE — ED ADULT TRIAGE NOTE - CHIEF COMPLAINT QUOTE
Pt coming into the ED with c/o SOB and CP. Pt has a wet cough and is tachypneic, RR 34. Pt has hx of CHF. Pt endorses she went on a cruise and came back sick, pt's  also not feeling well. Pt endorses weight gain and more swelling in the legs. Endorses Nausea. Pt is tachycardiac in triage. STAT EKG in triage.

## 2024-04-29 NOTE — ED ADULT NURSE NOTE - OBJECTIVE STATEMENT
72 y/o female awake alert and oriented x4 presents to ED c/o SOB x 2-3 days. Pt was on a cruise when sx started. Fever tmax 101 this morning. c/o worsening b/l LE swelling, nausea, vomiting, chest pain, wet cough. hx CHF on lasix, afib on coumadin. + tachycardia noted.

## 2024-04-29 NOTE — ED ADULT NURSE NOTE - NSFALLRISKFACTORS_ED_ALL_ED
coumadin/Coagulation: Bleeding disorder either through use of anticoagulants or underlying clinical condition(s)

## 2024-04-29 NOTE — ED PROVIDER NOTE - CARE PLAN
1 Principal Discharge DX:	Atrial fibrillation with RVR  Secondary Diagnosis:	Acute hypoxic respiratory failure

## 2024-04-29 NOTE — H&P ADULT - HISTORY OF PRESENT ILLNESS
74 y/o F with PMHx of HTN, DM2, AS, CAD, PVD, HFpEF (55%) Afib on Coumadin s/p ablation presents to the ED BIBEMS for progressive shortness of breath and JETT. Pt states that she just came back from a cruise yesterday and that over the past 2 days she has felt increasingly short of breath. She notes she had a fever of 101 at home and has noticed worsening LE edema. Pt denies CP, abdominal pain but does admit to nausea and diarrhea last night. She notes that for the past 2 days she has had very little PO intake since feeling ill. Pt's  who she came in with is also sick. In the ED pt was found to be Flu A  positive and with increased WOB, BNP of 1800 was placed on Bipap and found to be in Afib w RVR. ICU consulted for the above.

## 2024-04-29 NOTE — ED PROVIDER NOTE - PHYSICAL EXAMINATION
Constitutional: tachypneic, speaking in full sentences, AAOx3  Eyes: EOMI, PERRL  Head: Normocephalic atraumatic  Mouth: no airway obstruction, posterior oropharynx clear without erythema or exudate  Neck: supple  Cardiac: tachycardic, irregular rhythm, no MRG  Resp: coarse breath sounds b/l with expiratory wheezing  GI: Abd s/nt/nd  Neuro: CN2-12 intact, strength 5/5x4, sensation grossly intact  Skin: No rashes  MSK: 2+ b/l LE edema

## 2024-04-29 NOTE — ED PROVIDER NOTE - OBJECTIVE STATEMENT
73yoF PMH CHF, Afib s/p ablation on coumadin presents with SOB x2-3 days starting while on cruise. Endorses fever 101F this morning, cp, b/l LE swelling, nausea without vomiting, diarrhea. No abdominal pain, urinary symptoms.  is also sick

## 2024-04-29 NOTE — H&P ADULT - ASSESSMENT
72 y/o F with PMHx of HTN, DM2, AS, CAD, PVD, HFpEF (55%) Afib s/p ablation on Coumadin presents to the ED BIBEMS for progressive shortness of breath and JETT x2 days.     Admit to ICU with:    Influenza A  Acute hypoxic respiratory failure 2/2  CHF exacerbation   ?developing R Pneumonia  Afib w RVR  Hypomagnesemia      NEURO: Ofirmev given for Tmax 102. Home gabapentin ordered   CV: BNP of 1800 ( prev per chart review highest 1500) Trop negative. Afib w RVR to 140s s/p 20 Cardizem IVP and 30 Cardizem PO in ED. Ordered for 25mg IVP Cardizem and restarted home Metoprolol  50mg PO with adequate response in RR. Appears volume overloaded, started on Lasix 40mg QD. Restarted home Statin, Well's score 1.5, low probability but will order D-dimer. If positive would consider further PE workup. TTE ordered.   PULM: On Bipap for CHF exacerbation, increased WOB tachypneic to 40s. Wheezing b/l ordered for Duoneb. CXR reviewed with minimal right sided congestion.   GI: NPO while on Bipap.  Stress ulcer prophylaxis with pantoprazole.  RENAL: Mg of 1.4 - ordered 2g for repletion. F/u repeat labs and electrolytes for maximum arrythmia suppression. Lasix 40mg QD - monitor strict I/Os.    ID: S/p 1x dose of Azithro and ceftriaxone. Trend fever curve. No leukocytosis. Flu A positive- started Tamiflu. Lactate wnl, Procal ordered. Cultures sent.   ENDO: Pt taking prednisone 5mg PO QD for leg pain? x last several months - restarted.  DM2, started on ISS to maintain -180.  HEME: subtherapeutic INR of 1.5. Pt states she didn't take her coumadin today, gave 1x dose of 5mg Coumadin and also full dose Lovenox. F/u coags in AM. H/H stable.       CRITICAL CARE TIME SPENT: 46  minutes of critical care time spent providing medical care for patient's acute illness/conditions that impairs at least one vital organ system and/or poses a high risk of imminent or life threatening deterioration in the patient's condition. It includes time spent evaluating and treating the patient's acute illness as well as time spent reviewing labs, radiology, discussing goals of care with patient and/or patient's family, and discussing the case with a multidisciplinary team, in an effort to prevent further life threatening deterioration or end organ damage. This time is independent of any procedures performed.   74 y/o F with PMHx of HTN, DM2, AS, CAD, PVD, HFpEF (55%) Afib s/p ablation on Coumadin presents to the ED BIBEMS for progressive shortness of breath and JETT x2 days.     Admit to ICU with:    Acute respiratory failure 2/2  CHF exacerbation,  Influenza A  ?developing R Pneumonia  Afib w RVR  Hypomagnesemia      NEURO: Ofirmev given for Tmax 102. Home gabapentin ordered   CV: BNP of 1800 ( prev per chart review highest 1500) Trop negative. Afib w RVR to 140s s/p 20 Cardizem IVP and 30 Cardizem PO in ED. Ordered for 25mg IVP Cardizem and restarted home Metoprolol  50mg PO with adequate response in RR. Appears volume overloaded, started on Lasix 40mg QD. Restarted home Statin, Well's score 1.5, low probability but will order D-dimer. If positive would consider further PE workup. TTE ordered.   PULM: On Bipap for CHF exacerbation, increased WOB tachypneic to 40s. Wheezing b/l ordered for Duoneb. CXR reviewed with minimal right sided congestion.   GI: NPO while on Bipap.  Stress ulcer prophylaxis with pantoprazole.  RENAL: Mg of 1.4 - ordered 2g for repletion. F/u repeat labs and electrolytes for maximum arrythmia suppression. Lasix 40mg QD - monitor strict I/Os.    ID: S/p 1x dose of Azithro and ceftriaxone. Trend fever curve. No leukocytosis. Flu A positive- started Tamiflu. Lactate wnl, Procal ordered. Cultures sent.   ENDO: Pt taking prednisone 5mg PO QD for leg pain? x last several months - restarted.  DM2, started on ISS to maintain -180.  HEME: subtherapeutic INR of 1.5. Pt states she didn't take her coumadin today, gave 1x dose of 5mg Coumadin and also full dose Lovenox. F/u coags in AM. H/H stable.       CRITICAL CARE TIME SPENT: 46  minutes of critical care time spent providing medical care for patient's acute illness/conditions that impairs at least one vital organ system and/or poses a high risk of imminent or life threatening deterioration in the patient's condition. It includes time spent evaluating and treating the patient's acute illness as well as time spent reviewing labs, radiology, discussing goals of care with patient and/or patient's family, and discussing the case with a multidisciplinary team, in an effort to prevent further life threatening deterioration or end organ damage. This time is independent of any procedures performed.

## 2024-04-29 NOTE — ED ADULT NURSE NOTE - NSFALLHARMRISKINTERV_ED_ALL_ED

## 2024-04-30 ENCOUNTER — RESULT REVIEW (OUTPATIENT)
Age: 74
End: 2024-04-30

## 2024-04-30 LAB
A1C WITH ESTIMATED AVERAGE GLUCOSE RESULT: 5.8 % — HIGH (ref 4–5.6)
ALBUMIN SERPL ELPH-MCNC: 2.8 G/DL — LOW (ref 3.3–5)
ALP SERPL-CCNC: 82 U/L — SIGNIFICANT CHANGE UP (ref 40–120)
ALT FLD-CCNC: 45 U/L — SIGNIFICANT CHANGE UP (ref 12–78)
ANION GAP SERPL CALC-SCNC: 6 MMOL/L — SIGNIFICANT CHANGE UP (ref 5–17)
AST SERPL-CCNC: 37 U/L — SIGNIFICANT CHANGE UP (ref 15–37)
BASE EXCESS BLDV CALC-SCNC: 2.4 MMOL/L — SIGNIFICANT CHANGE UP (ref -2–3)
BASOPHILS # BLD AUTO: 0.03 K/UL — SIGNIFICANT CHANGE UP (ref 0–0.2)
BASOPHILS NFR BLD AUTO: 0.5 % — SIGNIFICANT CHANGE UP (ref 0–2)
BILIRUB SERPL-MCNC: 0.5 MG/DL — SIGNIFICANT CHANGE UP (ref 0.2–1.2)
BUN SERPL-MCNC: 13 MG/DL — SIGNIFICANT CHANGE UP (ref 7–23)
CALCIUM SERPL-MCNC: 8.8 MG/DL — SIGNIFICANT CHANGE UP (ref 8.5–10.1)
CHLORIDE SERPL-SCNC: 102 MMOL/L — SIGNIFICANT CHANGE UP (ref 96–108)
CO2 SERPL-SCNC: 27 MMOL/L — SIGNIFICANT CHANGE UP (ref 22–31)
CREAT SERPL-MCNC: 0.72 MG/DL — SIGNIFICANT CHANGE UP (ref 0.5–1.3)
D DIMER BLD IA.RAPID-MCNC: <150 NG/ML DDU — SIGNIFICANT CHANGE UP
EGFR: 88 ML/MIN/1.73M2 — SIGNIFICANT CHANGE UP
EOSINOPHIL # BLD AUTO: 0.01 K/UL — SIGNIFICANT CHANGE UP (ref 0–0.5)
EOSINOPHIL NFR BLD AUTO: 0.2 % — SIGNIFICANT CHANGE UP (ref 0–6)
ESTIMATED AVERAGE GLUCOSE: 120 MG/DL — HIGH (ref 68–114)
FT4I SERPL CALC-MCNC: 3 FTI% — SIGNIFICANT CHANGE UP (ref 1.4–4.8)
FT4I SERPL CALC-MCNC: 9 INDEX — SIGNIFICANT CHANGE UP (ref 4.4–11.4)
GAS PNL BLDV: SIGNIFICANT CHANGE UP
GLUCOSE BLDC GLUCOMTR-MCNC: 111 MG/DL — HIGH (ref 70–99)
GLUCOSE BLDC GLUCOMTR-MCNC: 134 MG/DL — HIGH (ref 70–99)
GLUCOSE BLDC GLUCOMTR-MCNC: 141 MG/DL — HIGH (ref 70–99)
GLUCOSE BLDC GLUCOMTR-MCNC: 143 MG/DL — HIGH (ref 70–99)
GLUCOSE SERPL-MCNC: 107 MG/DL — HIGH (ref 70–99)
HCO3 BLDV-SCNC: 30 MMOL/L — HIGH (ref 22–29)
HCT VFR BLD CALC: 37.7 % — SIGNIFICANT CHANGE UP (ref 34.5–45)
HGB BLD-MCNC: 12 G/DL — SIGNIFICANT CHANGE UP (ref 11.5–15.5)
IMM GRANULOCYTES NFR BLD AUTO: 0.3 % — SIGNIFICANT CHANGE UP (ref 0–0.9)
INR BLD: 1.36 RATIO — HIGH (ref 0.85–1.18)
LYMPHOCYTES # BLD AUTO: 0.54 K/UL — LOW (ref 1–3.3)
LYMPHOCYTES # BLD AUTO: 8.7 % — LOW (ref 13–44)
MAGNESIUM SERPL-MCNC: 1.4 MG/DL — LOW (ref 1.6–2.6)
MAGNESIUM SERPL-MCNC: 2 MG/DL — SIGNIFICANT CHANGE UP (ref 1.6–2.6)
MCHC RBC-ENTMCNC: 30.4 PG — SIGNIFICANT CHANGE UP (ref 27–34)
MCHC RBC-ENTMCNC: 31.8 GM/DL — LOW (ref 32–36)
MCV RBC AUTO: 95.4 FL — SIGNIFICANT CHANGE UP (ref 80–100)
MONOCYTES # BLD AUTO: 0.78 K/UL — SIGNIFICANT CHANGE UP (ref 0–0.9)
MONOCYTES NFR BLD AUTO: 12.5 % — SIGNIFICANT CHANGE UP (ref 2–14)
NEUTROPHILS # BLD AUTO: 4.86 K/UL — SIGNIFICANT CHANGE UP (ref 1.8–7.4)
NEUTROPHILS NFR BLD AUTO: 77.8 % — HIGH (ref 43–77)
NT-PROBNP SERPL-SCNC: 1299 PG/ML — HIGH (ref 0–125)
PCO2 BLDV: 60 MMHG — HIGH (ref 39–42)
PH BLDV: 7.31 — LOW (ref 7.32–7.43)
PHOSPHATE SERPL-MCNC: 3.9 MG/DL — SIGNIFICANT CHANGE UP (ref 2.5–4.5)
PHOSPHATE SERPL-MCNC: 4.1 MG/DL — SIGNIFICANT CHANGE UP (ref 2.5–4.5)
PLATELET # BLD AUTO: 267 K/UL — SIGNIFICANT CHANGE UP (ref 150–400)
PO2 BLDV: 73 MMHG — HIGH (ref 25–45)
POTASSIUM SERPL-MCNC: 3.8 MMOL/L — SIGNIFICANT CHANGE UP (ref 3.5–5.3)
POTASSIUM SERPL-SCNC: 3.8 MMOL/L — SIGNIFICANT CHANGE UP (ref 3.5–5.3)
PROCALCITONIN SERPL-MCNC: 0.06 NG/ML — SIGNIFICANT CHANGE UP (ref 0.02–0.1)
PROT SERPL-MCNC: 6.8 GM/DL — SIGNIFICANT CHANGE UP (ref 6–8.3)
PROTHROM AB SERPL-ACNC: 15.2 SEC — HIGH (ref 9.5–13)
RBC # BLD: 3.95 M/UL — SIGNIFICANT CHANGE UP (ref 3.8–5.2)
RBC # FLD: 13.7 % — SIGNIFICANT CHANGE UP (ref 10.3–14.5)
SAO2 % BLDV: 95 % — HIGH (ref 67–88)
SODIUM SERPL-SCNC: 135 MMOL/L — SIGNIFICANT CHANGE UP (ref 135–145)
T3 SERPL-MCNC: 90 NG/DL — SIGNIFICANT CHANGE UP (ref 80–200)
T3/T3 UPTAKE INDEX SERPL-RTO: 39 % — SIGNIFICANT CHANGE UP (ref 28–41)
T4 AB SER-ACNC: 7.7 UG/DL — SIGNIFICANT CHANGE UP (ref 4.6–12)
T4/T3 UPTAKE INDEX SERPL: 0.9 TBI — SIGNIFICANT CHANGE UP (ref 0.8–1.3)
TSH SERPL-MCNC: 0.2 UU/ML — LOW (ref 0.34–4.82)
WBC # BLD: 6.24 K/UL — SIGNIFICANT CHANGE UP (ref 3.8–10.5)
WBC # FLD AUTO: 6.24 K/UL — SIGNIFICANT CHANGE UP (ref 3.8–10.5)

## 2024-04-30 PROCEDURE — 99233 SBSQ HOSP IP/OBS HIGH 50: CPT

## 2024-04-30 PROCEDURE — 93306 TTE W/DOPPLER COMPLETE: CPT | Mod: 26

## 2024-04-30 PROCEDURE — 99232 SBSQ HOSP IP/OBS MODERATE 35: CPT

## 2024-04-30 RX ORDER — ALBUTEROL 90 UG/1
2.5 AEROSOL, METERED ORAL EVERY 6 HOURS
Refills: 0 | Status: DISCONTINUED | OUTPATIENT
Start: 2024-04-30 | End: 2024-05-06

## 2024-04-30 RX ORDER — CHLORHEXIDINE GLUCONATE 213 G/1000ML
1 SOLUTION TOPICAL DAILY
Refills: 0 | Status: DISCONTINUED | OUTPATIENT
Start: 2024-04-30 | End: 2024-05-07

## 2024-04-30 RX ORDER — METOPROLOL TARTRATE 50 MG
5 TABLET ORAL ONCE
Refills: 0 | Status: COMPLETED | OUTPATIENT
Start: 2024-04-30 | End: 2024-04-30

## 2024-04-30 RX ORDER — INFLUENZA VIRUS VACCINE 15; 15; 15; 15 UG/.5ML; UG/.5ML; UG/.5ML; UG/.5ML
0.7 SUSPENSION INTRAMUSCULAR ONCE
Refills: 0 | Status: DISCONTINUED | OUTPATIENT
Start: 2024-04-30 | End: 2024-05-07

## 2024-04-30 RX ORDER — WARFARIN SODIUM 2.5 MG/1
5 TABLET ORAL AT BEDTIME
Refills: 0 | Status: COMPLETED | OUTPATIENT
Start: 2024-04-30 | End: 2024-04-30

## 2024-04-30 RX ORDER — ENOXAPARIN SODIUM 100 MG/ML
120 INJECTION SUBCUTANEOUS EVERY 12 HOURS
Refills: 0 | Status: DISCONTINUED | OUTPATIENT
Start: 2024-04-30 | End: 2024-05-03

## 2024-04-30 RX ORDER — ACETAMINOPHEN 500 MG
1000 TABLET ORAL ONCE
Refills: 0 | Status: COMPLETED | OUTPATIENT
Start: 2024-04-30 | End: 2024-04-30

## 2024-04-30 RX ORDER — METOPROLOL TARTRATE 50 MG
2.5 TABLET ORAL ONCE
Refills: 0 | Status: COMPLETED | OUTPATIENT
Start: 2024-04-30 | End: 2024-04-30

## 2024-04-30 RX ORDER — IPRATROPIUM/ALBUTEROL SULFATE 18-103MCG
3 AEROSOL WITH ADAPTER (GRAM) INHALATION ONCE
Refills: 0 | Status: COMPLETED | OUTPATIENT
Start: 2024-04-30 | End: 2024-04-30

## 2024-04-30 RX ORDER — INSULIN LISPRO 100/ML
VIAL (ML) SUBCUTANEOUS
Refills: 0 | Status: DISCONTINUED | OUTPATIENT
Start: 2024-04-30 | End: 2024-05-07

## 2024-04-30 RX ORDER — MONTELUKAST 4 MG/1
10 TABLET, CHEWABLE ORAL AT BEDTIME
Refills: 0 | Status: DISCONTINUED | OUTPATIENT
Start: 2024-04-30 | End: 2024-05-07

## 2024-04-30 RX ORDER — MAGNESIUM SULFATE 500 MG/ML
2 VIAL (ML) INJECTION ONCE
Refills: 0 | Status: COMPLETED | OUTPATIENT
Start: 2024-04-30 | End: 2024-04-30

## 2024-04-30 RX ORDER — POTASSIUM CHLORIDE 20 MEQ
40 PACKET (EA) ORAL ONCE
Refills: 0 | Status: COMPLETED | OUTPATIENT
Start: 2024-04-30 | End: 2024-04-30

## 2024-04-30 RX ADMIN — ENOXAPARIN SODIUM 120 MILLIGRAM(S): 100 INJECTION SUBCUTANEOUS at 23:13

## 2024-04-30 RX ADMIN — Medication 2.5 MILLIGRAM(S): at 12:17

## 2024-04-30 RX ADMIN — MONTELUKAST 10 MILLIGRAM(S): 4 TABLET, CHEWABLE ORAL at 21:32

## 2024-04-30 RX ADMIN — ENOXAPARIN SODIUM 120 MILLIGRAM(S): 100 INJECTION SUBCUTANEOUS at 11:40

## 2024-04-30 RX ADMIN — GABAPENTIN 800 MILLIGRAM(S): 400 CAPSULE ORAL at 12:09

## 2024-04-30 RX ADMIN — ALBUTEROL 2.5 MILLIGRAM(S): 90 AEROSOL, METERED ORAL at 20:19

## 2024-04-30 RX ADMIN — ALBUTEROL 2.5 MILLIGRAM(S): 90 AEROSOL, METERED ORAL at 14:34

## 2024-04-30 RX ADMIN — ATORVASTATIN CALCIUM 10 MILLIGRAM(S): 80 TABLET, FILM COATED ORAL at 21:32

## 2024-04-30 RX ADMIN — CHLORHEXIDINE GLUCONATE 1 APPLICATION(S): 213 SOLUTION TOPICAL at 11:41

## 2024-04-30 RX ADMIN — Medication 50 MILLIGRAM(S): at 00:13

## 2024-04-30 RX ADMIN — Medication 75 MILLIGRAM(S): at 00:13

## 2024-04-30 RX ADMIN — Medication 1000 MILLIGRAM(S): at 15:15

## 2024-04-30 RX ADMIN — Medication 75 MILLIGRAM(S): at 10:07

## 2024-04-30 RX ADMIN — Medication 40 MILLIGRAM(S): at 10:07

## 2024-04-30 RX ADMIN — Medication 50 MILLIGRAM(S): at 10:07

## 2024-04-30 RX ADMIN — GABAPENTIN 800 MILLIGRAM(S): 400 CAPSULE ORAL at 21:32

## 2024-04-30 RX ADMIN — Medication 600 MILLIGRAM(S): at 21:32

## 2024-04-30 RX ADMIN — ENOXAPARIN SODIUM 120 MILLIGRAM(S): 100 INJECTION SUBCUTANEOUS at 00:14

## 2024-04-30 RX ADMIN — Medication 75 MILLIGRAM(S): at 21:32

## 2024-04-30 RX ADMIN — Medication 40 MILLIEQUIVALENT(S): at 11:40

## 2024-04-30 RX ADMIN — WARFARIN SODIUM 5 MILLIGRAM(S): 2.5 TABLET ORAL at 21:32

## 2024-04-30 RX ADMIN — Medication 5 MILLIGRAM(S): at 16:53

## 2024-04-30 RX ADMIN — GABAPENTIN 800 MILLIGRAM(S): 400 CAPSULE ORAL at 05:29

## 2024-04-30 RX ADMIN — WARFARIN SODIUM 5 MILLIGRAM(S): 2.5 TABLET ORAL at 00:13

## 2024-04-30 RX ADMIN — Medication 5 MILLIGRAM(S): at 00:13

## 2024-04-30 RX ADMIN — Medication 400 MILLIGRAM(S): at 14:14

## 2024-04-30 RX ADMIN — Medication 5 MILLIGRAM(S): at 10:07

## 2024-04-30 RX ADMIN — Medication 25 GRAM(S): at 04:03

## 2024-04-30 NOTE — PROGRESS NOTE ADULT - NS ATTEND AMEND GEN_ALL_CORE FT
ASSESSMENT  74 yo female with PMHx of HTN, DM2, AS, CAD, PVD, HFpEF (55%) Afib on Coumadin s/p ablation 2023 presents to the ED BIBEMS for progressive shortness of breath and JETT.     Admitted for  1. Acute hypoxic resp failure - multifactorial  2. decompensated HFpEF suspect due to noncompliance with diet  3. viral URI 2/2 + Influenza A   4. Afib with RVR  5. low TSH      Plan:  Continue BBx for AFIb  Lovenox as a bridge until INR is therapeutic  Cardio Consult  ECHO  D/C NIV  PO Diet  TSH low.  Check T3 and T4  B/L LE Dopplers  Continue Tamiflu   DVT Prophylaxis Lovenox

## 2024-04-30 NOTE — PATIENT PROFILE ADULT - OVER THE PAST TWO WEEKS, HAVE YOU FELT LITTLE INTEREST OR PLEASURE IN DOING THINGS?
Critical Care  Note      03/20/2024    Name: Yohana Marques MRN#: [id number]   Age: 75 year old YOB: 1948     Hsptl Day# 12  ICU DAY # 12    MV DAY # 12             Problem List:   Principal Problem:    ACS (acute coronary syndrome) (H)  Active Problems:    Weakness    Severe sepsis (H)  Guillain Charlton sydrome  Acute hypercapnic respiratory failure         Summary/Hospital Course:   75F pmh of HTN was admitted 3/8 with stress cardiomyopathy and progressive global weakness concerning for guillain barre syndrome. On my interview she denies recent innoculation or viral illness symptoms, also denies common neoplasm review of symptoms including coughing, bloody sputum, constipation, diarrhea, bloody urine or stool.    She has had progressive global weakness which now seems to involve her respiratory muscles.  Workup is consistent with Guillain-Barré.  She has been intubated and supported on mechanical ventilation for respiratory muscle weakness.    3/17 - worsening CXR  3/18 - CT obtained abx broadened, completed PLEX  3/19 - off levophed, bronch for pulm toilet, noted anthracosis of airways,  unclear of any exposures though noted does have [employer] near house as well he works as [employer] but patient with minimal interaction with each      Interim History     - AM Hgb down thus transfused 1 uPRBC  - diuresis initiated as grossly volume up   - de recruits with movement positioning, increased secretions  -           Assessment and plan :     Yohana Marques IS a 75 year old female admitted on 3/8/2024 for probable guillain barre syndrome.   I have personally reviewed the daily labs, imaging studies, cultures and discussed the case with referring physician and consulting physicians.     My assessment and plan by system for this patient is as follows:    Neurology/Psychiatry:   1. Acute flacid quadriparesis 2/2 guillain barre syndrome. W/unit(s) consistent with GBS - Plex initiated and  completed   2. Pain/analgesia:  fentanyl gtt and as needed   3. Sedation: none  PLAN  - continue supportive care   - neuro following  - discontinue HD line    Cardiovascular:   Takatsubo cardiomyopathy with chf exacerbation  - Repeated TTE, still hypokinesia but improved and EF is recovering to 40-45%  Afib with RVR,  Shock, likely mixed , vasoplegic,  cardiogenic as well with concern for autonomic dysfunction.    PLAN  - follow hemodynamics, goal MAP >65  - Follow volume status .aim even for now , continue diuresis 40 BID    - continue  amiodarone bolus and drip.  - Heparin drip for stroke prevention      Pulmonary/Ventilator Management:   Acute hypercapnic respiratory failure, requiring mechanical ventilation:  worsening hypercapnia off mechanical support.   Bilateral infiltrate on chest imaging; Polymicrobial sputum c/w LRTI interim increase secretions and hemoptysis , inflammatory marker increasing , s/p bronch prelim diff  c/w infection  PLAN  -  continue lung protective ventilation, currently at ~ 8m/kg , increase PEEP for recruitment,   - pulm toilet  - thoracic consulted for Trach and PEG (anticipate 3/22)   - continue abx  - FUP bronch results,     GI and Nutrition :   1. RD consult, on TF  2.  PPI for pud prophy    Renal/Fluids/Electrolytes:   1. Cr, UOP appropriate -  2.  Electrolyte abnormalities - hypokalemia, hypernatremia, hypocalcemia 2/2 critical illness, - improved, continue replacement protocols  3. Acid base - stable    4. Volume overload - +10 L positive   -Monitor urine output and I&O, avoid nephrotoxic medications.  -replace electrolytes per protocol.    -  40 bid lasix     Infectious Disease:   1. Sepsis, secondary to HCAP , polymicrobial with R organism (Hafnia) ,with worsening imaging and secretions on current therapy .   - FUP bronch results  -  continue ceftaz, and  vanc   - work up as above   - serial CRP and procal     Endocrine:   1. Stress hyperglycemia   - BS control per ICU protocol  "    Hematology/Oncology:   1. Leukocytosis to 13.6 ?reactive vs HAP  2. Anemia, Hb 7.7, drift <7, transfusing 1 unit(s) PRBC, no evidence for overt blood loss.  3. Pltlts  4. Coagulopathy, med induced, need watch hemoptysis and weight risk benefit with heparin gtt      ICU Prophylaxis:   1. DVT: mechanical; Heparin  2. VAP: HOB 30 degrees, chlorhexidine rinse  3. Stress Ulcer: PPI  4. Restraints: None indicated. Will readdress daily.   5. Wound care  - per unit routine   6. Feeding -tube feed  7. Family Update: family  at bedside  8. Disposition - icu    Clinically Significant Risk Factors        # Hypokalemia: Lowest K = 3.3 mmol/L in last 2 days, will replace as needed   # Hypocalcemia: Lowest Ca = 8.2 mg/dL in last 2 days, will monitor and replace as appropriate     # Hypoalbuminemia: Lowest albumin = 3.3 g/dL at 3/9/2024  6:04 AM, will monitor as appropriate     # Hypertension: Noted on problem list        # Obesity: Estimated body mass index is 35.34 kg/m  as calculated from the following:    Height as of this encounter: 1.727 m (5' 7.99\").    Weight as of this encounter: 105.4 kg (232 lb 5.8 oz).      # Financial/Environmental Concerns: none                    Critical Care Time: 40 min.  I spent this time (excluding procedures) personally providing and directing critical care services at the bedside and on the critical care unit.     Regan Bui MD  Pulmonary and Critical Care                 Key Medications:      acetylcysteine  2 mL Nebulization Q6H    albuterol  2.5 mg Nebulization Q6H    [Held by provider] aspirin  81 mg Oral or Feeding Tube Daily    cefTAZidime  2 g Intravenous Q8H    chlorhexidine  15 mL Mouth/Throat Q12H    fiber modular  1 packet Per Feeding Tube BID    pantoprazole  40 mg Per Feeding Tube QAM AC    Or    pantoprazole  40 mg Intravenous QAM AC    QUEtiapine  50 mg Oral or Feeding Tube BID    sodium chloride (PF)  3 mL Intracatheter Q8H    vancomycin  1,250 mg " Intravenous Q12H      amiodarone 1 mg/min (03/20/24 0736)    dextrose      fentaNYL 50 mcg/hr (03/20/24 1017)    [Held by provider] heparin Stopped (03/20/24 0901)    - MEDICATION INSTRUCTIONS -      norepinephrine Stopped (03/19/24 0824)    - MEDICATION INSTRUCTIONS -      sodium chloride 20 mL/hr at 03/19/24 2222              Physical Examination:   Temp:  [99.5  F (37.5  C)-101.7  F (38.7  C)] 99.9  F (37.7  C)  Pulse:  [70-96] 77  Resp:  [12-33] 28  BP: ()/(47-91) 106/54  FiO2 (%):  [50 %-100 %] 65 %  SpO2:  [85 %-100 %] 96 %        Intake/Output Summary (Last 24 hours) at 3/20/2024 1219  Last data filed at 3/20/2024 1210  Gross per 24 hour   Intake 3473.1 ml   Output 1850 ml   Net 1623.1 ml         Wt Readings from Last 4 Encounters:   03/20/24 105.4 kg (232 lb 5.8 oz)   07/12/23 93 kg (205 lb)   05/02/23 92.4 kg (203 lb 12.8 oz)   04/28/23 92.4 kg (203 lb 12.8 oz)     BP - Mean:  [] 75  Vent Mode: CMV/AC  (Continuous Mandatory Ventilation/ Assist Control)  FiO2 (%): 65 %  Resp Rate (Set): 14 breaths/min  Tidal Volume (Set, mL): 420 mL  PEEP (cm H2O): 10 cmH2O  Resp: 28    Recent Labs   Lab 03/18/24  0950   O2PER 50         GEN: no acute distress awake alert   HEENT: head ncat, sclera anicteric, OP patent, trachea midline   PULM: unlabored synchronous minor rhonchi bilaterally.  Continued orange   secretions.  CV/COR: irregular  S1S2 no gallop,  No rub, no murmur  ABD: soft nontender  EXT:  warm and well perfused x4  NEURO: PERRL, patient follows commands, significant weakness in the upper and lower extremities  ~3/5 in upper and lower   SKIN: no obvious rash  LINES: clean, dry intact         Data:   All data and imaging reviewed     ROUTINE ICU LABS (Last four results)  CMP  Recent Labs   Lab 03/20/24  0419 03/19/24  2202 03/19/24  1234 03/19/24  1206 03/19/24  0851 03/19/24  0555 03/18/24  0517 03/17/24  1113 03/17/24  0330     --   --   --   --  140 140  --  139   POTASSIUM 3.5  3.5  --    "--  3.8  --  3.3* 3.8 4.1 2.8*   CHLORIDE 103  --   --   --   --  105 105  --  104   CO2 25  --   --   --   --  22 25  --  23   ANIONGAP 11  --   --   --   --  13 10  --  12   * 136* 138*  --  136* 177* 131*  --  133*   BUN 19.2  --   --   --   --  12.3 14.3  --  17.7   CR 0.54  --   --   --   --  0.39* 0.47*  --  0.57   GFRESTIMATED >90  --   --   --   --  >90 >90  --  >90   YEE 8.3*  --   --   --   --  8.2* 8.1*  --  7.3*   MAG  --   --   --  1.9  --   --  2.1 2.7* 1.4*   PHOS 2.2*  --   --   --   --  2.4* 2.1*  --  2.5   PROTTOTAL 5.5*  --   --   --   --  5.4* 5.7*  --  4.8*   ALBUMIN 3.5  --   --   --   --  3.9 3.8  --  3.7   BILITOTAL 0.8  --   --   --   --  1.0 0.8  --  0.9   ALKPHOS 55  --   --   --   --  47 40  --  25*   AST 21  --   --   --   --  20 19  --  17   ALT 11  --   --   --   --  5 8  --  7     CBC  Recent Labs   Lab 03/20/24  0419 03/19/24  0555 03/18/24  0517 03/17/24  1113 03/17/24  0610 03/17/24  0330   WBC 10.7 13.6* 12.5*  --   --  13.8*   RBC 2.28* 2.56* 2.79*  --   --  2.54*   HGB 6.8* 7.7* 8.1* 8.0*   < > 7.5*   HCT 20.2* 22.9* 24.7*  --   --  22.5*   MCV 89 90 89  --   --  89   MCH 29.8 30.1 29.0  --   --  29.5   MCHC 33.7 33.6 32.8  --   --  33.3   RDW 19.5* 19.4* 19.6*  --   --  18.6*    165 172 207  --  190    < > = values in this interval not displayed.     INRNo lab results found in last 7 days.  Arterial Blood Gas  Recent Labs   Lab 03/18/24  0950   O2PER 50         All cultures:  No results for input(s): \"CULT\" in the last 168 hours.      " no

## 2024-04-30 NOTE — PHYSICAL THERAPY INITIAL EVALUATION ADULT - MODALITIES TREATMENT COMMENTS
pt left sitting in BS chair, CBIR, O2, monitors, VSS, LEs elev, served breakfast, HP to L med. thigh, ++JETT -resolving w/ rest, conferred w/ nsg

## 2024-04-30 NOTE — PATIENT PROFILE ADULT - PATIENT'S SEXUAL ORIENTATION
PAST MEDICAL HISTORY:  Fibroids     H/O chlamydia infection     H/O knee surgery     H/O trichomonal vaginitis     No pertinent past medical history     
Heterosexual

## 2024-04-30 NOTE — PHYSICAL THERAPY INITIAL EVALUATION ADULT - GENERAL OBSERVATIONS, REHAB EVAL
pt obese, supine in bed in ICU, O2, primafit, monitors, BLE edema- L>R- pt reports chr but worse, BLE discoloration-chronic.  pt agreeable to [facility] for breakfast. c/o L med. thigh cramp w/ supine->sit->OOB (reports last night was w/ lower leg cramp)

## 2024-04-30 NOTE — PROGRESS NOTE ADULT - SUBJECTIVE AND OBJECTIVE BOX
ICU Downgrade Acceptance Note      Patient downgraded from ICU: off Bipap  on 02 3 liters  on lasix and tamiflu    Review of Systems:  General:denies fever chills, headache, weakness  HEENT: denies blurry vision,diffculty swallowing, difficulty hearing, tinnitus  Cardiovascular: denies chest pain  ,palpitations  Pulmonary:denies shortness of breath, cough, wheezing, hemoptysis  Gastrointestinal: denies abdominal pain, constipation, diarrhea,nausea , vomiting, hematochezia  : denies hematuria, dysuria, or incontinence  Neurological: denies weakness, numbness , tingling, dizziness, tremors  MSK: denies muscle pain, difficulty ambulating, swelling, back pain  skin: denies skin rash, itching, burning, or  skin lesions  Psychiatrical: denies mood disturbances, anxierty, feeling depressed, depression , or difficulty sleeping    Objective:  Vitals  T(C): 37.9 (04-30-24 @ 10:00), Max: 39.3 (04-29-24 @ 20:14)  HR: 112 (04-30-24 @ 15:00) (94 - 158)  BP: 115/83 (04-30-24 @ 15:00) (84/72 - 174/139)  RR: 26 (04-30-24 @ 15:00) (18 - 34)  SpO2: 96% (04-30-24 @ 15:00) (95% - 100%)    Physical Exam:  General: comfortable, no acute distress  HEENT: Atraumatic, no LAD, trachea midline, PERRLA  Cardiovascular: normal s1s2, no murmurs, gallops or fricition rubs  Pulmonary: clear to ausculation Bilaterally, no wheezing , rhonchi  Gastrointestinal: soft non tender non distended, no masses felt, no organomegally  Muscloskeletal: no lower extremity edema, intact bilateral lower extremity pulses  Neurological: CN II-12 intact. No focal weakness  Psychiatrical: normal mood, cooperative  SKIN: no rash, lesions or ulcers    Labs:                          12.0   6.24  )-----------( 267      ( 30 Apr 2024 05:44 )             37.7     04-30    135  |  102  |  13  ----------------------------<  107<H>  3.8   |  27  |  0.72    Ca    8.8      30 Apr 2024 05:44  Phos  4.1     04-30  Mg     2.0     04-30    TPro  6.8  /  Alb  2.8<L>  /  TBili  0.5  /  DBili  x   /  AST  37  /  ALT  45  /  AlkPhos  82  04-30    LIVER FUNCTIONS - ( 30 Apr 2024 05:44 )  Alb: 2.8 g/dL / Pro: 6.8 gm/dL / ALK PHOS: 82 U/L / ALT: 45 U/L / AST: 37 U/L / GGT: x           PT/INR - ( 30 Apr 2024 05:44 )   PT: 15.2 sec;   INR: 1.36 ratio         PTT - ( 29 Apr 2024 19:22 )  PTT:37.3 sec      Active Medications  MEDICATIONS  (STANDING):  albuterol    0.083% 2.5 milliGRAM(s) Nebulizer every 6 hours  atorvastatin 10 milliGRAM(s) Oral at bedtime  chlorhexidine 4% Liquid 1 Application(s) Topical daily  dextrose 10% Bolus 125 milliLiter(s) IV Bolus once  dextrose 5%. 1000 milliLiter(s) (50 mL/Hr) IV Continuous <Continuous>  dextrose 50% Injectable 25 Gram(s) IV Push once  enoxaparin Injectable 120 milliGRAM(s) SubCutaneous every 12 hours  furosemide   Injectable 40 milliGRAM(s) IV Push daily  gabapentin 800 milliGRAM(s) Oral three times a day  glucagon  Injectable 1 milliGRAM(s) IntraMuscular once  guaiFENesin  milliGRAM(s) Oral every 12 hours  influenza  Vaccine (HIGH DOSE) 0.7 milliLiter(s) IntraMuscular once  insulin lispro (ADMELOG) corrective regimen sliding scale   SubCutaneous Before meals and at bedtime  metoprolol succinate ER 50 milliGRAM(s) Oral daily  montelukast 10 milliGRAM(s) Oral at bedtime  oseltamivir 75 milliGRAM(s) Oral two times a day  predniSONE   Tablet 5 milliGRAM(s) Oral daily  warfarin 5 milliGRAM(s) Oral at bedtime    MEDICATIONS  (PRN):  dextrose Oral Gel 15 Gram(s) Oral once PRN Blood Glucose LESS THAN 70 milliGRAM(s)/deciliter

## 2024-04-30 NOTE — PHYSICAL THERAPY INITIAL EVALUATION ADULT - PERTINENT HX OF CURRENT PROBLEM, REHAB EVAL
presents to the ED BIBEMS for progressive shortness of breath and JETT. Pt states that she just came back from a cruise yesterday and that over the past 2 days she has felt increasingly short of breath. She notes she had a fever of 101 at home and has noticed worsening LE edema. Pt denies CP, abdominal pain but does admit to nausea and diarrhea last night. She notes that for the past 2 days she has had very little PO intake since feeling ill. Pt's  who she came in with is also sick. In the ED pt was found to be Flu A  positive and with increased WOB, BNP of 1800 was placed on Bipap and found to be in Afib w RVR. adm to ICU.

## 2024-04-30 NOTE — PROGRESS NOTE ADULT - ASSESSMENT
74 yo female with PMHx of HTN, DM2, AS, CAD, PVD, HFpEF (55%) Afib on Coumadin s/p ablation 2023 presents to the ED BIBEMS for progressive shortness of breath and JETT admitted for acute hypoxemic respiratory failure due to influenza and acute exacerbation of CHF requiring Bipap. Stabilized off bipap on 02 , transferred to tele       Acute hypoxic resp failure - multifactorial due to 1  1. decompensated HFpEF suspect due to noncompliance with diet  2. viral URI 2/2 + Influenza A   plan;  tamiflu x 5 days.  trend WBC. monitor temps. f/u blood and urine cultures.   diurese  echo    Afib with RVR due to the above  BP mildly elevated. in Afib on monitor rate suboptimal 100-115. cont toprol 50mg qd.  INR 1.30.  lovenox 120mg bid to bridge warfarin 5mg qd.    Hypothyroidism  Check TSH, t4

## 2024-04-30 NOTE — PROGRESS NOTE ADULT - ASSESSMENT
ASSESSMENT  72 yo female with PMHx of HTN, DM2, AS, CAD, PVD, HFpEF (55%) Afib on Coumadin s/p ablation 2023 presents to the ED BIBEMS for progressive shortness of breath and JETT.     Admitted for  1. Acute hypoxic resp failure - multifactorial  2. decompensated HFpEF suspect due to noncompliance with diet  3. viral URI 2/2 + Influenza A   4. Afib with RVR  5. low TSH    PLAN    Neuro: AAOx   CV:   Pulm: maintain O2 sat > 94%.  GI: PPI. bowel regimen prn  Nephro: monitor I & Os. Trend renal fxn  Endo: check a1c. BGMs ac hs. ISS.   Vasc:   MSK:   ID: trend WBC. monitor temps. f/u blood and urine cultures.   Heme: DVT ppx -   PT eval    Dispo:    Will discuss with   ASSESSMENT  74 yo female with PMHx of HTN, DM2, AS, CAD, PVD, HFpEF (55%) Afib on Coumadin s/p ablation 2023 presents to the ED BIBEMS for progressive shortness of breath and JETT.     Admitted for  1. Acute hypoxic resp failure - multifactorial  2. decompensated HFpEF suspect due to noncompliance with diet  3. viral URI 2/2 + Influenza A   4. Afib with RVR  5. low TSH    PLAN    Neuro: AAOx 3. pain control prn tylenol. cont home dose gabapentin 800mg tid.   CV: BP mildly elevated. in Afib on monitor rate suboptimal 100-115. cont toprol 50mg qd. will give additional dose IV lopressor 2.5mg x1.   Pulm: wore bipap overnight for ~4hrs now off. on 2-3L nc sating 92-95%. check CXR today. titrate to maintain O2 sat > 94%.  GI: PO dash diet. PPI. bowel regimen prn  Nephro: Cr 0.72. cont IV lasix 40mg qd. good UOP response to lasix. monitor I & Os. Trend renal fxn. Keep Mg > 2 and K > 4 for optimal arrhythmia suppression.  Endo: check a1c. BGMs ac hs. ISS. TSH 0.20. check T4 and T3.  Vasc: check US doppler LE b/l  ID: cont tamiflu x 5 days.  trend WBC. monitor temps. f/u blood and urine cultures.   Heme: Hgb stable. INR 1.30.  lovenox 120mg bid to bridge warfarin 5mg qd for Afib.   PT eval    Dispo: Stable for tele. lasix, tamiflu. lovenox and warfarin bridge. TTE.     Will discuss with Dr. Delatorre ASSESSMENT  72 yo female with PMHx of HTN, DM2, AS, CAD, PVD, HFpEF (55%) Afib on Coumadin s/p ablation 2023 presents to the ED BIBEMS for progressive shortness of breath and JETT.     Admitted for  1. Acute hypoxic resp failure - multifactorial  2. decompensated HFpEF suspect due to noncompliance with diet  3. viral URI 2/2 + Influenza A   4. Afib with RVR  5. low TSH    PLAN    Neuro: AAOx 3. pain control prn tylenol. cont home dose gabapentin 800mg tid.   CV: BP mildly elevated. in Afib on monitor rate suboptimal 100-115. cont toprol 50mg qd. will give additional dose IV lopressor 2.5mg x1.   Pulm: wore bipap overnight for ~4hrs now off. on 2-3L nc sating 92-95%. check CXR today. titrate to maintain O2 sat > 94%.  GI: PO dash diet. PPI. bowel regimen prn  Nephro: Cr 0.72. cont IV lasix 40mg qd. good UOP response to lasix. monitor I & Os. Trend renal fxn. Keep Mg > 2 and K > 4 for optimal arrhythmia suppression.  Endo: check a1c. BGMs ac hs. ISS. TSH 0.20. check T4 and T3.  Vasc: check US doppler LE b/l  ID: cont tamiflu x 5 days.  trend WBC. monitor temps. f/u blood and urine cultures.   Heme: Hgb stable. INR 1.30.  lovenox 120mg bid to bridge warfarin 5mg qd for Afib.   PT eval    Dispo: Stable for tele. lasix, tamiflu. lovenox and warfarin bridge. TTE.     Will discuss with Dr. Delatorre    signed out to Dr. Willis 12:30

## 2024-04-30 NOTE — PROGRESS NOTE ADULT - SUBJECTIVE AND OBJECTIVE BOX
Patient is a 73y old  Female who presents with a chief complaint of CHF exacerbation , Flu (29 Apr 2024 23:00)      BRIEF HOSPITAL COURSE: ***    Events last 24 hours: ***    PAST MEDICAL & SURGICAL HISTORY:  Diabetes mellitus type II, controlled      Atrial fibrillation      Congestive heart failure      Dyspnea      Morbid obesity with BMI of 40.0-44.9, adult      Neuropathy      Peripheral venous insufficiency      Thyroid nodule      HTN (hypertension)      Cellulitis      At risk for sleep apnea      History of esophagogastroduodenoscopy (EGD)      H/O colonoscopy      Other complications of gastric band procedure      S/P left knee arthroscopy      Status post medial meniscus repair      History of cholecystectomy      S/P cataract surgery            Medications:  oseltamivir 75 milliGRAM(s) Oral two times a day    furosemide   Injectable 40 milliGRAM(s) IV Push daily  metoprolol succinate ER 50 milliGRAM(s) Oral daily    albuterol    0.083% 2.5 milliGRAM(s) Nebulizer every 6 hours  albuterol/ipratropium for Nebulization. 3 milliLiter(s) Nebulizer once  guaiFENesin  milliGRAM(s) Oral every 12 hours  montelukast 10 milliGRAM(s) Oral at bedtime    acetaminophen   IVPB .. 1000 milliGRAM(s) IV Intermittent once  gabapentin 800 milliGRAM(s) Oral three times a day      enoxaparin Injectable 120 milliGRAM(s) SubCutaneous every 12 hours  warfarin 5 milliGRAM(s) Oral at bedtime        atorvastatin 10 milliGRAM(s) Oral at bedtime  dextrose 50% Injectable 25 Gram(s) IV Push once  dextrose Oral Gel 15 Gram(s) Oral once PRN  glucagon  Injectable 1 milliGRAM(s) IntraMuscular once  insulin lispro (ADMELOG) corrective regimen sliding scale   SubCutaneous Before meals and at bedtime  predniSONE   Tablet 5 milliGRAM(s) Oral daily    dextrose 10% Bolus 125 milliLiter(s) IV Bolus once  dextrose 5%. 1000 milliLiter(s) IV Continuous <Continuous>  potassium chloride    Tablet ER 40 milliEquivalent(s) Oral once    influenza  Vaccine (HIGH DOSE) 0.7 milliLiter(s) IntraMuscular once    chlorhexidine 4% Liquid 1 Application(s) Topical daily            ICU Vital Signs Last 24 Hrs  T(C): 38.1 (30 Apr 2024 04:00), Max: 39.3 (29 Apr 2024 20:14)  T(F): 100.5 (30 Apr 2024 04:00), Max: 102.7 (29 Apr 2024 20:14)  HR: 119 (30 Apr 2024 11:00) (94 - 158)  BP: 134/101 (30 Apr 2024 11:00) (84/72 - 174/139)  BP(mean): 109 (30 Apr 2024 11:00) (75 - 151)  ABP: --  ABP(mean): --  RR: 25 (30 Apr 2024 11:00) (18 - 34)  SpO2: 99% (30 Apr 2024 11:00) (95% - 100%)    O2 Parameters below as of 30 Apr 2024 10:00  Patient On (Oxygen Delivery Method): nasal cannula  O2 Flow (L/min): 3              I&O's Detail    29 Apr 2024 07:01  -  30 Apr 2024 07:00  --------------------------------------------------------  IN:    IV PiggyBack: 50 mL  Total IN: 50 mL    OUT:    Voided (mL): 1100 mL  Total OUT: 1100 mL    Total NET: -1050 mL            LABS:                        12.0   6.24  )-----------( 267      ( 30 Apr 2024 05:44 )             37.7     04-30    135  |  102  |  13  ----------------------------<  107<H>  3.8   |  27  |  0.72    Ca    8.8      30 Apr 2024 05:44  Phos  4.1     04-30  Mg     2.0     04-30    TPro  6.8  /  Alb  2.8<L>  /  TBili  0.5  /  DBili  x   /  AST  37  /  ALT  45  /  AlkPhos  82  04-30          CAPILLARY BLOOD GLUCOSE      POCT Blood Glucose.: 111 mg/dL (30 Apr 2024 05:20)    PT/INR - ( 30 Apr 2024 05:44 )   PT: 15.2 sec;   INR: 1.36 ratio         PTT - ( 29 Apr 2024 19:22 )  PTT:37.3 sec  Urinalysis Basic - ( 30 Apr 2024 05:44 )    Color: x / Appearance: x / SG: x / pH: x  Gluc: 107 mg/dL / Ketone: x  / Bili: x / Urobili: x   Blood: x / Protein: x / Nitrite: x   Leuk Esterase: x / RBC: x / WBC x   Sq Epi: x / Non Sq Epi: x / Bacteria: x      CULTURES:      Physical Examination:    General: No acute distress.      HEENT: Pupils equal, reactive to light.  Symmetric.    PULM: Clear to auscultation bilaterally, no significant sputum production  CVS: tachycardia, irregular rhythm, no murmurs  ABD: Soft, nondistended, nontender, normoactive bowel sounds  EXT: non pitting edema in LE b/l L > R, nontender  SKIN: Warm, venous stasis change in LE b/l.   NEURO: Alert, oriented, interactive,     DEVICES:     RADIOLOGY:     Patient is a 73y old  Female who presents with a chief complaint of CHF exacerbation , Flu (29 Apr 2024 23:00)    BRIEF HOSPITAL COURSE: 74 yo female with PMHx of HTN, DM2, AS, CAD, PVD, HFpEF (55%) Afib on Coumadin s/p ablation 2023 presents to the ED BIBEMS for progressive shortness of breath and JETT. Pt states that she just came back from a cruise yesterday and that over the past 2 days she has felt increasingly short of breath. She notes she had a fever of 101 at home and has noticed worsening LE edema. Pt denies CP, abdominal pain but does admit to nausea and diarrhea last night. She notes that for the past 2 days she has had very little PO intake since feeling ill. Pt's  who she came in with is also sick. In the ED pt was found to be Flu A  positive and with increased WOB, BNP of 1800 was placed on Bipap and found to be in Afib w RVR. ICU consulted for the above.       4/30 pt  still coughing and feels sob, but breathing improved from yesterday. speaking in few words.     PAST MEDICAL & SURGICAL HISTORY:  Diabetes mellitus type II, controlled  Atrial fibrillation  Congestive heart failure  Dyspnea  Morbid obeity with BMI of 40.0-44.9, adult  Neuropathy  Peripheral venous insufficiency  Thyroid nodul  HTN (hypertension)  Cellulitis  At risk for sleep apnea  History of esophagogastroduodenoscopy (EGD)  H/O colonoscopy  Other complications of gastric band procedure  S/P left knee arthroscopy  Status post medial meniscus repair  History of cholecystectomy  S/P cataract surgery    Medications:  oseltamivir 75 milliGRAM(s) Oral two times a day  furosemide   Injectable 40 milliGRAM(s) IV Push daily  metoprolol succinate ER 50 milliGRAM(s) Oral daily  albuterol    0.083% 2.5 milliGRAM(s) Nebulizer every 6 hours  albuterol/ipratropium for Nebulization. 3 milliLiter(s) Nebulizer once  guaiFENesin  milliGRAM(s) Oral every 12 hours  montelukast 10 milliGRAM(s) Oral at bedtime  acetaminophen   IVPB .. 1000 milliGRAM(s) IV Intermittent once  gabapentin 800 milliGRAM(s) Oral three times a day  enoxaparin Injectable 120 milliGRAM(s) SubCutaneous every 12 hours  warfarin 5 milliGRAM(s) Oral at bedtime  atorvastatin 10 milliGRAM(s) Oral at bedtime  dextrose 50% Injectable 25 Gram(s) IV Push once  dextrose Oral Gel 15 Gram(s) Oral once PRN  glucagon  Injectable 1 milliGRAM(s) IntraMuscular once  insulin lispro (ADMELOG) corrective regimen sliding scale   SubCutaneous Before meals and at bedtime  predniSONE   Tablet 5 milliGRAM(s) Oral daily  dextrose 10% Bolus 125 milliLiter(s) IV Bolus once  dextrose 5%. 1000 milliLiter(s) IV Continuous <Continuous>  potassium chloride    Tablet ER 40 milliEquivalent(s) Oral once  influenza  Vaccine (HIGH DOSE) 0.7 milliLiter(s) IntraMuscular once  chlorhexidine 4% Liquid 1 Application(s) Topical daily      ICU Vital Signs Last 24 Hrs  T(C): 38.1 (30 Apr 2024 04:00), Max: 39.3 (29 Apr 2024 20:14)  T(F): 100.5 (30 Apr 2024 04:00), Max: 102.7 (29 Apr 2024 20:14)  HR: 119 (30 Apr 2024 11:00) (94 - 158)  BP: 134/101 (30 Apr 2024 11:00) (84/72 - 174/139)  BP(mean): 109 (30 Apr 2024 11:00) (75 - 151)  ABP: --  ABP(mean): --  RR: 25 (30 Apr 2024 11:00) (18 - 34)  SpO2: 99% (30 Apr 2024 11:00) (95% - 100%)    O2 Parameters below as of 30 Apr 2024 10:00  Patient On (Oxygen Delivery Method): nasal cannula  O2 Flow (L/min): 3              I&O's Detail    29 Apr 2024 07:01  -  30 Apr 2024 07:00  --------------------------------------------------------  IN:    IV PiggyBack: 50 mL  Total IN: 50 mL    OUT:    Voided (mL): 1100 mL  Total OUT: 1100 mL    Total NET: -1050 mL            LABS:                        12.0   6.24  )-----------( 267      ( 30 Apr 2024 05:44 )             37.7     04-30    135  |  102  |  13  ----------------------------<  107<H>  3.8   |  27  |  0.72    Ca    8.8      30 Apr 2024 05:44  Phos  4.1     04-30  Mg     2.0     04-30    TPro  6.8  /  Alb  2.8<L>  /  TBili  0.5  /  DBili  x   /  AST  37  /  ALT  45  /  AlkPhos  82  04-30          CAPILLARY BLOOD GLUCOSE      POCT Blood Glucose.: 111 mg/dL (30 Apr 2024 05:20)    PT/INR - ( 30 Apr 2024 05:44 )   PT: 15.2 sec;   INR: 1.36 ratio         PTT - ( 29 Apr 2024 19:22 )  PTT:37.3 sec  Urinalysis Basic - ( 30 Apr 2024 05:44 )    Color: x / Appearance: x / SG: x / pH: x  Gluc: 107 mg/dL / Ketone: x  / Bili: x / Urobili: x   Blood: x / Protein: x / Nitrite: x   Leuk Esterase: x / RBC: x / WBC x   Sq Epi: x / Non Sq Epi: x / Bacteria: x      CULTURES:      Physical Examination:    General: No acute distress.      HEENT: Pupils equal, reactive to light.  Symmetric.    PULM: Clear to auscultation bilaterally, no significant sputum production  CVS: tachycardia, irregular rhythm, no murmurs  ABD: Soft, nondistended, nontender, normoactive bowel sounds  EXT: non pitting edema in LE b/l L > R, nontender  SKIN: Warm, venous stasis change in LE b/l.   NEURO: Alert, oriented, interactive,     DEVICES:     RADIOLOGY:     Patient is a 73y old  Female who presents with a chief complaint of CHF exacerbation , Flu (29 Apr 2024 23:00)    BRIEF HOSPITAL COURSE: 74 yo female with PMHx of HTN, DM2, AS, CAD, PVD, HFpEF (55%) Afib on Coumadin s/p ablation 2023 presents to the ED BIBEMS for progressive shortness of breath and JETT. Pt states that she just came back from a cruise yesterday and that over the past 2 days she has felt increasingly short of breath. She notes she had a fever of 101 at home and has noticed worsening LE edema. Pt denies CP, abdominal pain but does admit to nausea and diarrhea last night. She notes that for the past 2 days she has had very little PO intake since feeling ill. Pt's  who she came in with is also sick. In the ED pt was found to be Flu A  positive and with increased WOB, BNP of 1800 was placed on Bipap and found to be in Afib w RVR. ICU consulted for the above.     4/30 pt  still coughing and feels sob, but breathing improved from yesterday. speaking in few words.     PAST MEDICAL & SURGICAL HISTORY:  Diabetes mellitus type II, controlled  Atrial fibrillation  Congestive heart failure  Dyspnea  Morbid obeity with BMI of 40.0-44.9, adult  Neuropathy  Peripheral venous insufficiency  Thyroid nodul  HTN (hypertension)  Cellulitis  At risk for sleep apnea  History of esophagogastroduodenoscopy (EGD)  H/O colonoscopy  Other complications of gastric band procedure  S/P left knee arthroscopy  Status post medial meniscus repair  History of cholecystectomy  S/P cataract surgery    Medications:  oseltamivir 75 milliGRAM(s) Oral two times a day  furosemide   Injectable 40 milliGRAM(s) IV Push daily  metoprolol succinate ER 50 milliGRAM(s) Oral daily  albuterol    0.083% 2.5 milliGRAM(s) Nebulizer every 6 hours  albuterol/ipratropium for Nebulization. 3 milliLiter(s) Nebulizer once  guaiFENesin  milliGRAM(s) Oral every 12 hours  montelukast 10 milliGRAM(s) Oral at bedtime  acetaminophen   IVPB .. 1000 milliGRAM(s) IV Intermittent once  gabapentin 800 milliGRAM(s) Oral three times a day  enoxaparin Injectable 120 milliGRAM(s) SubCutaneous every 12 hours  warfarin 5 milliGRAM(s) Oral at bedtime  atorvastatin 10 milliGRAM(s) Oral at bedtime  dextrose 50% Injectable 25 Gram(s) IV Push once  dextrose Oral Gel 15 Gram(s) Oral once PRN  glucagon  Injectable 1 milliGRAM(s) IntraMuscular once  insulin lispro (ADMELOG) corrective regimen sliding scale   SubCutaneous Before meals and at bedtime  predniSONE   Tablet 5 milliGRAM(s) Oral daily  dextrose 10% Bolus 125 milliLiter(s) IV Bolus once  dextrose 5%. 1000 milliLiter(s) IV Continuous <Continuous>  potassium chloride    Tablet ER 40 milliEquivalent(s) Oral once  influenza  Vaccine (HIGH DOSE) 0.7 milliLiter(s) IntraMuscular once  chlorhexidine 4% Liquid 1 Application(s) Topical daily      ICU Vital Signs Last 24 Hrs  T(C): 38.1 (30 Apr 2024 04:00), Max: 39.3 (29 Apr 2024 20:14)  T(F): 100.5 (30 Apr 2024 04:00), Max: 102.7 (29 Apr 2024 20:14)  HR: 119 (30 Apr 2024 11:00) (94 - 158)  BP: 134/101 (30 Apr 2024 11:00) (84/72 - 174/139)  BP(mean): 109 (30 Apr 2024 11:00) (75 - 151)  ABP: --  ABP(mean): --  RR: 25 (30 Apr 2024 11:00) (18 - 34)  SpO2: 99% (30 Apr 2024 11:00) (95% - 100%)    O2 Parameters below as of 30 Apr 2024 10:00  Patient On (Oxygen Delivery Method): nasal cannula  O2 Flow (L/min): 3      I&O's Detail    29 Apr 2024 07:01  -  30 Apr 2024 07:00  --------------------------------------------------------  IN:    IV PiggyBack: 50 mL  Total IN: 50 mL    OUT:    Voided (mL): 1100 mL  Total OUT: 1100 mL    Total NET: -1050 mL      LABS:                        12.0   6.24  )-----------( 267      ( 30 Apr 2024 05:44 )             37.7     04-30    135  |  102  |  13  ----------------------------<  107<H>  3.8   |  27  |  0.72    Ca    8.8      30 Apr 2024 05:44  Phos  4.1     04-30  Mg     2.0     04-30    TPro  6.8  /  Alb  2.8<L>  /  TBili  0.5  /  DBili  x   /  AST  37  /  ALT  45  /  AlkPhos  82  04-30      CAPILLARY BLOOD GLUCOSE    POCT Blood Glucose.: 111 mg/dL (30 Apr 2024 05:20)    PT/INR - ( 30 Apr 2024 05:44 )   PT: 15.2 sec;   INR: 1.36 ratio    PTT - ( 29 Apr 2024 19:22 )  PTT:37.3 sec  Urinalysis Basic - ( 30 Apr 2024 05:44 )    Color: x / Appearance: x / SG: x / pH: x  Gluc: 107 mg/dL / Ketone: x  / Bili: x / Urobili: x   Blood: x / Protein: x / Nitrite: x   Leuk Esterase: x / RBC: x / WBC x   Sq Epi: x / Non Sq Epi: x / Bacteria: x    CULTURES:    Physical Examination:    General: No acute distress.    HEENT: Pupils equal, reactive to light.  Symmetric.  PULM: course breath sounds b/l, rhonchi.  CVS: tachycardia, irregular rhythm, no murmurs  ABD: Soft, nondistended, nontender,  EXT: non pitting edema in LE b/l L > R, nontender  SKIN: Warm, venous stasis change in LE b/l.   NEURO: Alert, oriented, interactive,     DEVICES:     RADIOLOGY:

## 2024-05-01 LAB
ANION GAP SERPL CALC-SCNC: 5 MMOL/L — SIGNIFICANT CHANGE UP (ref 5–17)
ANION GAP SERPL CALC-SCNC: 5 MMOL/L — SIGNIFICANT CHANGE UP (ref 5–17)
BUN SERPL-MCNC: 15 MG/DL — SIGNIFICANT CHANGE UP (ref 7–23)
BUN SERPL-MCNC: 16 MG/DL — SIGNIFICANT CHANGE UP (ref 7–23)
CALCIUM SERPL-MCNC: 8.4 MG/DL — LOW (ref 8.5–10.1)
CALCIUM SERPL-MCNC: 8.6 MG/DL — SIGNIFICANT CHANGE UP (ref 8.5–10.1)
CHLORIDE SERPL-SCNC: 101 MMOL/L — SIGNIFICANT CHANGE UP (ref 96–108)
CHLORIDE SERPL-SCNC: 102 MMOL/L — SIGNIFICANT CHANGE UP (ref 96–108)
CO2 SERPL-SCNC: 28 MMOL/L — SIGNIFICANT CHANGE UP (ref 22–31)
CO2 SERPL-SCNC: 28 MMOL/L — SIGNIFICANT CHANGE UP (ref 22–31)
CREAT SERPL-MCNC: 0.64 MG/DL — SIGNIFICANT CHANGE UP (ref 0.5–1.3)
CREAT SERPL-MCNC: 0.73 MG/DL — SIGNIFICANT CHANGE UP (ref 0.5–1.3)
EGFR: 86 ML/MIN/1.73M2 — SIGNIFICANT CHANGE UP
EGFR: 93 ML/MIN/1.73M2 — SIGNIFICANT CHANGE UP
GLUCOSE BLDC GLUCOMTR-MCNC: 108 MG/DL — HIGH (ref 70–99)
GLUCOSE BLDC GLUCOMTR-MCNC: 110 MG/DL — HIGH (ref 70–99)
GLUCOSE BLDC GLUCOMTR-MCNC: 127 MG/DL — HIGH (ref 70–99)
GLUCOSE BLDC GLUCOMTR-MCNC: 142 MG/DL — HIGH (ref 70–99)
GLUCOSE SERPL-MCNC: 104 MG/DL — HIGH (ref 70–99)
GLUCOSE SERPL-MCNC: 107 MG/DL — HIGH (ref 70–99)
HCT VFR BLD CALC: 37 % — SIGNIFICANT CHANGE UP (ref 34.5–45)
HGB BLD-MCNC: 11.5 G/DL — SIGNIFICANT CHANGE UP (ref 11.5–15.5)
INR BLD: 1.62 RATIO — HIGH (ref 0.85–1.18)
LACTATE SERPL-SCNC: 1.2 MMOL/L — SIGNIFICANT CHANGE UP (ref 0.7–2)
MAGNESIUM SERPL-MCNC: 1.8 MG/DL — SIGNIFICANT CHANGE UP (ref 1.6–2.6)
MAGNESIUM SERPL-MCNC: 1.9 MG/DL — SIGNIFICANT CHANGE UP (ref 1.6–2.6)
MCHC RBC-ENTMCNC: 29.6 PG — SIGNIFICANT CHANGE UP (ref 27–34)
MCHC RBC-ENTMCNC: 31.1 GM/DL — LOW (ref 32–36)
MCV RBC AUTO: 95.4 FL — SIGNIFICANT CHANGE UP (ref 80–100)
PHOSPHATE SERPL-MCNC: 3.3 MG/DL — SIGNIFICANT CHANGE UP (ref 2.5–4.5)
PHOSPHATE SERPL-MCNC: 3.4 MG/DL — SIGNIFICANT CHANGE UP (ref 2.5–4.5)
PLATELET # BLD AUTO: 232 K/UL — SIGNIFICANT CHANGE UP (ref 150–400)
POTASSIUM SERPL-MCNC: 3.3 MMOL/L — LOW (ref 3.5–5.3)
POTASSIUM SERPL-MCNC: 3.7 MMOL/L — SIGNIFICANT CHANGE UP (ref 3.5–5.3)
POTASSIUM SERPL-SCNC: 3.3 MMOL/L — LOW (ref 3.5–5.3)
POTASSIUM SERPL-SCNC: 3.7 MMOL/L — SIGNIFICANT CHANGE UP (ref 3.5–5.3)
PROTHROM AB SERPL-ACNC: 18.1 SEC — HIGH (ref 9.5–13)
RBC # BLD: 3.88 M/UL — SIGNIFICANT CHANGE UP (ref 3.8–5.2)
RBC # FLD: 13.7 % — SIGNIFICANT CHANGE UP (ref 10.3–14.5)
SODIUM SERPL-SCNC: 134 MMOL/L — LOW (ref 135–145)
SODIUM SERPL-SCNC: 135 MMOL/L — SIGNIFICANT CHANGE UP (ref 135–145)
WBC # BLD: 4.4 K/UL — SIGNIFICANT CHANGE UP (ref 3.8–10.5)
WBC # FLD AUTO: 4.4 K/UL — SIGNIFICANT CHANGE UP (ref 3.8–10.5)

## 2024-05-01 PROCEDURE — 99232 SBSQ HOSP IP/OBS MODERATE 35: CPT

## 2024-05-01 PROCEDURE — 71045 X-RAY EXAM CHEST 1 VIEW: CPT | Mod: 26

## 2024-05-01 PROCEDURE — 93010 ELECTROCARDIOGRAM REPORT: CPT

## 2024-05-01 RX ORDER — METOPROLOL TARTRATE 50 MG
2.5 TABLET ORAL ONCE
Refills: 0 | Status: COMPLETED | OUTPATIENT
Start: 2024-05-01 | End: 2024-05-01

## 2024-05-01 RX ORDER — SODIUM CHLORIDE 9 MG/ML
250 INJECTION INTRAMUSCULAR; INTRAVENOUS; SUBCUTANEOUS ONCE
Refills: 0 | Status: COMPLETED | OUTPATIENT
Start: 2024-05-01 | End: 2024-05-01

## 2024-05-01 RX ORDER — WARFARIN SODIUM 2.5 MG/1
5 TABLET ORAL DAILY
Refills: 0 | Status: COMPLETED | OUTPATIENT
Start: 2024-05-01 | End: 2024-05-03

## 2024-05-01 RX ORDER — ACETYLCYSTEINE 200 MG/ML
4 VIAL (ML) MISCELLANEOUS EVERY 12 HOURS
Refills: 0 | Status: DISCONTINUED | OUTPATIENT
Start: 2024-05-01 | End: 2024-05-05

## 2024-05-01 RX ORDER — ALBUMIN HUMAN 25 %
100 VIAL (ML) INTRAVENOUS ONCE
Refills: 0 | Status: COMPLETED | OUTPATIENT
Start: 2024-05-01 | End: 2024-05-01

## 2024-05-01 RX ORDER — ALBUTEROL 90 UG/1
1 AEROSOL, METERED ORAL EVERY 4 HOURS
Refills: 0 | Status: DISCONTINUED | OUTPATIENT
Start: 2024-05-01 | End: 2024-05-06

## 2024-05-01 RX ORDER — ACETAMINOPHEN 500 MG
1000 TABLET ORAL ONCE
Refills: 0 | Status: COMPLETED | OUTPATIENT
Start: 2024-05-01 | End: 2024-05-01

## 2024-05-01 RX ADMIN — Medication 40 MILLIGRAM(S): at 09:17

## 2024-05-01 RX ADMIN — SODIUM CHLORIDE 500 MILLILITER(S): 9 INJECTION INTRAMUSCULAR; INTRAVENOUS; SUBCUTANEOUS at 03:34

## 2024-05-01 RX ADMIN — GABAPENTIN 800 MILLIGRAM(S): 400 CAPSULE ORAL at 13:20

## 2024-05-01 RX ADMIN — ALBUTEROL 2.5 MILLIGRAM(S): 90 AEROSOL, METERED ORAL at 07:38

## 2024-05-01 RX ADMIN — Medication 50 MILLIGRAM(S): at 09:17

## 2024-05-01 RX ADMIN — Medication 600 MILLIGRAM(S): at 21:41

## 2024-05-01 RX ADMIN — ALBUTEROL 2.5 MILLIGRAM(S): 90 AEROSOL, METERED ORAL at 02:05

## 2024-05-01 RX ADMIN — ALBUTEROL 2.5 MILLIGRAM(S): 90 AEROSOL, METERED ORAL at 20:01

## 2024-05-01 RX ADMIN — ATORVASTATIN CALCIUM 10 MILLIGRAM(S): 80 TABLET, FILM COATED ORAL at 21:40

## 2024-05-01 RX ADMIN — MONTELUKAST 10 MILLIGRAM(S): 4 TABLET, CHEWABLE ORAL at 21:41

## 2024-05-01 RX ADMIN — GABAPENTIN 800 MILLIGRAM(S): 400 CAPSULE ORAL at 06:51

## 2024-05-01 RX ADMIN — Medication 75 MILLIGRAM(S): at 21:41

## 2024-05-01 RX ADMIN — Medication 50 MILLILITER(S): at 04:54

## 2024-05-01 RX ADMIN — Medication 2.5 MILLIGRAM(S): at 06:51

## 2024-05-01 RX ADMIN — Medication 600 MILLIGRAM(S): at 09:18

## 2024-05-01 RX ADMIN — GABAPENTIN 800 MILLIGRAM(S): 400 CAPSULE ORAL at 21:41

## 2024-05-01 RX ADMIN — ENOXAPARIN SODIUM 120 MILLIGRAM(S): 100 INJECTION SUBCUTANEOUS at 09:17

## 2024-05-01 RX ADMIN — Medication 75 MILLIGRAM(S): at 09:17

## 2024-05-01 RX ADMIN — ALBUTEROL 2.5 MILLIGRAM(S): 90 AEROSOL, METERED ORAL at 14:40

## 2024-05-01 RX ADMIN — ENOXAPARIN SODIUM 120 MILLIGRAM(S): 100 INJECTION SUBCUTANEOUS at 21:40

## 2024-05-01 RX ADMIN — WARFARIN SODIUM 5 MILLIGRAM(S): 2.5 TABLET ORAL at 21:41

## 2024-05-01 RX ADMIN — Medication 400 MILLIGRAM(S): at 03:34

## 2024-05-01 RX ADMIN — Medication 5 MILLIGRAM(S): at 09:18

## 2024-05-01 NOTE — PROGRESS NOTE ADULT - SUBJECTIVE AND OBJECTIVE BOX
72 yo female with PMHx of HTN, DM2, AS, CAD, PVD, HFpEF (55%) Afib on Coumadin s/p ablation 2023 presents to the ED BIBEMS for progressive shortness of breath and JETT admitted for acute hypoxemic respiratory failure due to influenza and acute exacerbation of CHF requiring Bipap. Stabilized off bipap on 02 , transferred to [facility]       Medical progress: Denies any HA, CP, + cough.   Complaints: no new comaplints  State of mind: normal  D/c planning in the am    Review of Systems:  General: denies fever chills, headache, weakness  HEENT: denies blurry vision, diffculty swallowing, difficulty hearing, tinnitus  Cardiovascular: denies chest pain  ,palpitations  Pulmonary: denies shortness of breath, cough, wheezing, hemoptysis  Gastrointestinal: denies abdominal pain, constipation, diarrhea, nausea , vomiting, hematochezia  : denies hematuria, dysuria, or incontinence  Neurological: denies weakness, numbness , tingling, dizziness, tremors  MSK: denies muscle pain, difficulty ambulating, swelling, back pain  skin: denies skin rash, itching, burning, or  skin lesions  Psychiatrical: denies mood disturbances, anxiety feeling depressed, depression , or difficulty sleeping    Physical Exam:  T(C): 36.7 (01 May 2024 08:46), Max: 39.1 (01 May 2024 02:56)  T(F): 98 (01 May 2024 08:46), Max: 102.3 (01 May 2024 02:56)  HR: 121 (01 May 2024 08:46) (99 - 152)  BP: 107/65 (01 May 2024 08:46) (86/75 - 126/74)  BP(mean): 98 (30 Apr 2024 20:00) (74 - 98)  RR: 18 (01 May 2024 08:46) (18 - 26)  SpO2: 96% (01 May 2024 08:46) (96% - 99%)  O2 Parameters below as of 01 May 2024 08:46  Patient On (Oxygen Delivery Method): nasal cannula  O2 Flow (L/min): 3  General: comfortable, no acute distress  HEENT: Atraumatic, no LAD, trachea midline, PERRLA  Cardiovascular: normal s1s2, no murmurs, gallops or fricition rubs  Pulmonary: clear to ausculation Bilaterally, no wheezing , rhonchi  Gastrointestinal: soft non tender non distended, no masses felt, no organomegally  Muscloskeletal: no lower extremity edema, intact bilateral lower extremity pulses  Neurological: CN II-12 intact. No focal weakness  Psychiatrical: normal mood, cooperative  SKIN: no rash, lesions or ulcers    Labs:  CBC Full  -  ( 01 May 2024 08:53 )  WBC Count : 4.40 K/uL  RBC Count : 3.88 M/uL  Hemoglobin : 11.5 g/dL  Hematocrit : 37.0 %  Platelet Count - Automated : 232 K/uL    PT/INR - ( 01 May 2024 08:53 )   PT: 18.1 sec;   INR: 1.62 ratio      PTT - ( 29 Apr 2024 19:22 )  PTT:37.3 sec  Urinalysis Basic - ( 01 May 2024 08:53 )    Color: x / Appearance: x / SG: x / pH: x  Gluc: 104 mg/dL / Ketone: x  / Bili: x / Urobili: x   Blood: x / Protein: x / Nitrite: x   Leuk Esterase: x / RBC: x / WBC x   Sq Epi: x / Non Sq Epi: x / Bacteria: x      05-01    135  |  102  |  15  ----------------------------<  104<H>  3.3<L>   |  28  |  0.64    Ca    8.6      01 May 2024 08:53  Phos  3.4     05-01  Mg     1.9     05-01    TPro  6.8  /  Alb  2.8<L>  /  TBili  0.5  /  DBili  x   /  AST  37  /  ALT  45  /  AlkPhos  82  04-30            # Acute hypoxic resp failure - multifactorial due to 1  # Decompensated HFpEF suspect due to noncompliance with diet  - viral URI 2/2 + Influenza A   - tamiflu x 5 days.  trend WBC. monitor temps. f/u blood and urine cultures.   - diurese  - echo    # a.fib with RVR due to the above  BP mildly elevated. in Afib on monitor rate suboptimal 100-115. cont toprol 50mg qd.  INR 1.30.  lovenox 120mg bid to bridge warfarin 5mg qd.    # Hypothyroidism  - Check TSH, t4     74 yo female with PMHx of HTN, DM2, AS, CAD, PVD, HFpEF (55%) Afib on Coumadin s/p ablation 2023 presents to the ED BIBEMS for progressive shortness of breath and JETT admitted for acute hypoxemic respiratory failure due to influenza and acute exacerbation of CHF requiring Bipap. Stabilized off bipap on 02 , transferred to [facility]       Medical progress: Denies any HA, CP, + cough.   Complaints: no new comaplints  State of mind: normal  D/c planning in the am    Review of Systems:  General: denies fever chills, headache, weakness  HEENT: denies blurry vision, diffculty swallowing, difficulty hearing, tinnitus  Cardiovascular: denies chest pain  ,palpitations  Pulmonary: denies shortness of breath, cough, wheezing, hemoptysis  Gastrointestinal: denies abdominal pain, constipation, diarrhea, nausea , vomiting, hematochezia  : denies hematuria, dysuria, or incontinence  Neurological: denies weakness, numbness , tingling, dizziness, tremors  MSK: denies muscle pain, difficulty ambulating, swelling, back pain  skin: denies skin rash, itching, burning, or  skin lesions  Psychiatrical: denies mood disturbances, anxiety feeling depressed, depression , or difficulty sleeping    Physical Exam:  T(C): 36.7 (01 May 2024 08:46), Max: 39.1 (01 May 2024 02:56)  T(F): 98 (01 May 2024 08:46), Max: 102.3 (01 May 2024 02:56)  HR: 121 (01 May 2024 08:46) (99 - 152)  BP: 107/65 (01 May 2024 08:46) (86/75 - 126/74)  BP(mean): 98 (30 Apr 2024 20:00) (74 - 98)  RR: 18 (01 May 2024 08:46) (18 - 26)  SpO2: 96% (01 May 2024 08:46) (96% - 99%)  O2 Parameters below as of 01 May 2024 08:46  Patient On (Oxygen Delivery Method): nasal cannula  O2 Flow (L/min): 3  General: comfortable, no acute distress  HEENT: Atraumatic, no LAD, trachea midline, PERRLA  Cardiovascular: normal s1s2, no murmurs, gallops or fricition rubs  Pulmonary: + congestion / + wheezing  Gastrointestinal: soft non tender non distended, no masses felt, no organomegally  Muscloskeletal: no lower extremity edema, intact bilateral lower extremity pulses  Neurological: CN II-12 intact. No focal weakness  Psychiatrical: normal mood, cooperative  SKIN: no rash, lesions or ulcers    Labs:  CBC Full  -  ( 01 May 2024 08:53 )  WBC Count : 4.40 K/uL  RBC Count : 3.88 M/uL  Hemoglobin : 11.5 g/dL  Hematocrit : 37.0 %  Platelet Count - Automated : 232 K/uL    PT/INR - ( 01 May 2024 08:53 )   PT: 18.1 sec;   INR: 1.62 ratio      PTT - ( 29 Apr 2024 19:22 )  PTT:37.3 sec  Urinalysis Basic - ( 01 May 2024 08:53 )    Color: x / Appearance: x / SG: x / pH: x  Gluc: 104 mg/dL / Ketone: x  / Bili: x / Urobili: x   Blood: x / Protein: x / Nitrite: x   Leuk Esterase: x / RBC: x / WBC x   Sq Epi: x / Non Sq Epi: x / Bacteria: x      05-01    135  |  102  |  15  ----------------------------<  104<H>  3.3<L>   |  28  |  0.64    Ca    8.6      01 May 2024 08:53  Phos  3.4     05-01  Mg     1.9     05-01    TPro  6.8  /  Alb  2.8<L>  /  TBili  0.5  /  DBili  x   /  AST  37  /  ALT  45  /  AlkPhos  82  04-30            # Acute hypoxic resp failure - multifactorial due to 1  # Decompensated HFpEF suspect due to noncompliance with diet  # Viral URI 2/2 + Influenza A   - tamiflu x 5 days   - monitor temps  - f/u blood and urine cultures.   - diurese  - echo    # a.fib with RVR due to the above  - BP mildly elevated. in Afib on monitor rate suboptimal 100-115  - cont toprol 50mg qd.  - INR 1.30  - lovenox 120mg bid to bridge warfarin 5mg qd.    # Hypothyroidism  - Check TSH, t4

## 2024-05-01 NOTE — CONSULT NOTE ADULT - ASSESSMENT
PROBLEMS:    Acute hypoxic resp failure - multifactorial   Decompensated HFpEF suspect due to noncompliance with diet  Viral URI 2/2 + Influenza A   A.fib with RVR due to the above  Hypothyroidism    PLAN:    Tamiflu x 5 days  Trend WBC  Monitor temps. f/u blood and urine cultures.   Diurese  Echo  Afib with RVR Lovenox 120mg bid to bridge warfarin 5mg qd.  DVT PROPHYLASIX PROBLEMS:    Acute hypoxic resp failure - multifactorial   Decompensated HFpEF suspect due to noncompliance with diet  Viral URI 2/2 + Influenza A   A.fib with RVR due to the above  Hypothyroidism    PLAN:    IV solumedrol 40mg q8hr  Neb with mucomyst  Tamiflu x 5 days  Trend WBC  Monitor temps. f/u blood and urine cultures.   Diurese  Echo  Afib with RVR Lovenox 120mg bid to bridge warfarin 5mg qd.  DVT PROPHYLASIX

## 2024-05-01 NOTE — CONSULT NOTE ADULT - SUBJECTIVE AND OBJECTIVE BOX
HPI:    73 y.o.f with PMHx of HTN, DM2, AS, CAD, PVD, HFpEF (55%) Afib on Coumadin s/p ablation admitted BIBEMS for progressive shortness of breath and JETT. Pt states that she just came back from a cruise yesterday and that over the past 2 days she has felt increasingly short of breath. She notes she had a fever of 101 at home and has noticed worsening LE edema. Pt denies CP, abdominal pain but does admit to nausea and diarrhea last night. She notes that for the past 2 days she has had very little PO intake since feeling ill. Pt's  who she came in with is also sick. In the ED pt was found to be Flu A  positive and with increased WOB, BNP of 1800 was placed on Bipap and found to be in Afib w RVR. pat intially admited to ICU with acute respiratory failure 2/2/ CHF exacerbation, Influenza A, ?developing R Pneumonia with afib with RVR, pat now transfer seen for pulmonary for continue congested. Stabilized off bipap on 02.    PAST MEDICAL & SURGICAL HISTORY:  Diabetes mellitus type II, controlled      Atrial fibrillation      Congestive heart failure      Dyspnea      Morbid obesity with BMI of 40.0-44.9, adult      Neuropathy      Peripheral venous insufficiency      Thyroid nodule      HTN (hypertension)      Cellulitis      At risk for sleep apnea      History of esophagogastroduodenoscopy (EGD)      H/O colonoscopy      Other complications of gastric band procedure      S/P left knee arthroscopy      Status post medial meniscus repair      History of cholecystectomy      S/P cataract surgery          Home Medications:  Acidophilus oral tablet: 1 tab(s) orally once a day (29 Apr 2024 22:44)  cholestyramine 4 g/5 g oral powder for reconstitution: 1 packet(s) orally once a day (in the morning) (29 Apr 2024 22:42)  ezetimibe 10 mg oral tablet: 1 tab(s) orally once a day (in the evening) (29 Apr 2024 22:43)  gabapentin 800 mg oral tablet: 1 tab(s) orally 3 times a day (29 Apr 2024 22:44)  losartan 25 mg oral tablet: 1 tab(s) orally once a day (29 Apr 2024 22:43)  metFORMIN 1000 mg oral tablet: 1 tab(s) orally 2 times a day (29 Apr 2024 22:43)  metoprolol succinate 50 mg oral tablet, extended release: 1 tab(s) orally once a day (29 Apr 2024 22:43)  montelukast 10 mg oral tablet: 1 tab(s) orally once a day (at bedtime) (29 Apr 2024 22:43)  pravastatin 10 mg oral tablet: 1 tab(s) orally once a day (in the evening) (29 Apr 2024 22:43)  predniSONE 5 mg oral tablet: 1 tab(s) orally once a day (29 Apr 2024 22:43)  Vitamin D3 25 mcg (1000 intl units) oral capsule: 1 cap(s) orally once a day (29 Apr 2024 22:44)  warfarin 5 mg oral tablet: 1 tab(s) orally once a day (at bedtime) (29 Apr 2024 22:44)  zolpidem 10 mg oral tablet: 1 tab(s) orally once a day (at bedtime) (29 Apr 2024 22:43)      MEDICATIONS  (STANDING):  albuterol    0.083% 2.5 milliGRAM(s) Nebulizer every 6 hours  atorvastatin 10 milliGRAM(s) Oral at bedtime  chlorhexidine 4% Liquid 1 Application(s) Topical daily  dextrose 10% Bolus 125 milliLiter(s) IV Bolus once  dextrose 5%. 1000 milliLiter(s) (50 mL/Hr) IV Continuous <Continuous>  dextrose 50% Injectable 25 Gram(s) IV Push once  enoxaparin Injectable 120 milliGRAM(s) SubCutaneous every 12 hours  furosemide   Injectable 40 milliGRAM(s) IV Push daily  gabapentin 800 milliGRAM(s) Oral three times a day  glucagon  Injectable 1 milliGRAM(s) IntraMuscular once  guaiFENesin  milliGRAM(s) Oral every 12 hours  influenza  Vaccine (HIGH DOSE) 0.7 milliLiter(s) IntraMuscular once  insulin lispro (ADMELOG) corrective regimen sliding scale   SubCutaneous Before meals and at bedtime  metoprolol succinate ER 50 milliGRAM(s) Oral daily  montelukast 10 milliGRAM(s) Oral at bedtime  oseltamivir 75 milliGRAM(s) Oral two times a day  predniSONE   Tablet 5 milliGRAM(s) Oral daily  warfarin 5 milliGRAM(s) Oral daily    MEDICATIONS  (PRN):  dextrose Oral Gel 15 Gram(s) Oral once PRN Blood Glucose LESS THAN 70 milliGRAM(s)/deciliter      Allergies    penicillin (Hives; Urticaria)  PC Pen VK (Rash)  latex (Unknown)  [This allergen will not trigger allergy alert] IV DYE, IODINE CONTRAST (Unknown)    Intolerances        SOCIAL HISTORY: Denies tobacco, etoh abuse or illicit drug use    FAMILY HISTORY:  Family history of breast cancer in mother    Family history of diabetes mellitus in mother    FHx: heart disease (Sibling)        Vital Signs Last 24 Hrs  T(C): 37.1 (01 May 2024 16:52), Max: 39.1 (01 May 2024 02:56)  T(F): 98.7 (01 May 2024 16:52), Max: 102.3 (01 May 2024 02:56)  HR: 47 (01 May 2024 16:52) (47 - 152)  BP: 103/68 (01 May 2024 16:52) (86/75 - 123/86)  BP(mean): 98 (30 Apr 2024 20:00) (98 - 98)  RR: 18 (01 May 2024 16:52) (18 - 20)  SpO2: 95% (01 May 2024 16:52) (95% - 98%)    Parameters below as of 01 May 2024 16:52  Patient On (Oxygen Delivery Method): nasal cannula  O2 Flow (L/min): 3          REVIEW OF SYSTEMS:    CONSTITUTIONAL:  As per HPI.  HEENT:  Eyes:  No diplopia or blurred vision. ENT:  No earache, sore throat or runny nose.  CARDIOVASCULAR:  No pressure, squeezing, tightness, heaviness or aching about the chest, neck, axilla or epigastrium.  RESPIRATORY:  No cough, shortness of breath, PND or orthopnea.  GASTROINTESTINAL:  No nausea, vomiting or diarrhea.  GENITOURINARY:  No dysuria, frequency or urgency.  MUSCULOSKELETAL:  As per HPI.  SKIN:  No change in skin, hair or nails.  NEUROLOGIC:  No paresthesias, fasciculations, seizures or weakness.  PSYCHIATRIC:  No disorder of thought or mood.  ENDOCRINE:  No heat or cold intolerance, polyuria or polydipsia.  HEMATOLOGICAL:  No easy bruising or bleedings:  .     PHYSICAL EXAMINATION:    GENERAL APPEARANCE:  Pt. is not currently dyspneic, in no distress. Pt. is alert, oriented, and pleasant.  HEENT:  Pupils are normal and react normally. No icterus. Mucous membranes well colored.  NECK:  Supple. No lymphadenopathy. Jugular venous pressure not elevated. Carotids equal.   HEART:   The cardiac impulse has a normal quality. Regular. Normal S1 and S2. There are no murmurs, rubs or gallops noted  CHEST:  Chest is clear to auscultation. Normal respiratory effort.  ABDOMEN:  Soft and nontender.   EXTREMITIES:  There is no cyanosis, clubbing or edema.   SKIN:  No rash or significant lesions are noted.    LABS:                        11.5   4.40  )-----------( 232      ( 01 May 2024 08:53 )             37.0     05-01    135  |  102  |  15  ----------------------------<  104<H>  3.3<L>   |  28  |  0.64    Ca    8.6      01 May 2024 08:53  Phos  3.4     05-01  Mg     1.9     05-01    TPro  6.8  /  Alb  2.8<L>  /  TBili  0.5  /  DBili  x   /  AST  37  /  ALT  45  /  AlkPhos  82  04-30    LIVER FUNCTIONS - ( 30 Apr 2024 05:44 )  Alb: 2.8 g/dL / Pro: 6.8 gm/dL / ALK PHOS: 82 U/L / ALT: 45 U/L / AST: 37 U/L / GGT: x           PT/INR - ( 01 May 2024 08:53 )   PT: 18.1 sec;   INR: 1.62 ratio               Urinalysis Basic - ( 01 May 2024 08:53 )    Color: x / Appearance: x / SG: x / pH: x  Gluc: 104 mg/dL / Ketone: x  / Bili: x / Urobili: x   Blood: x / Protein: x / Nitrite: x   Leuk Esterase: x / RBC: x / WBC x   Sq Epi: x / Non Sq Epi: x / Bacteria: x          Culture - Urine (collected 04-29-24 @ 20:24)  Source: Clean Catch None  Preliminary Report (05-01-24 @ 11:01):    50,000 - 99,000 CFU/mL Escherichia coli    <10,000 CFU/ml Normal Urogenital cherie present        RADIOLOGY & ADDITIONAL STUDIES:     Xray Chest 1 View-PORTABLE IMMEDIATE (04.29.24 @ 21:07) >  IMPRESSION:    No radiographic evidence of active chest disease..  Patient's mandible obscures LEFT apex.

## 2024-05-01 NOTE — CONSULT NOTE ADULT - ASSESSMENT
73 year old woman with the above PMH admitted with shortness of breath.  Work up so far reveals acute on chronic HFpEF, recurrent atrial fibrillation with a rapid VR, - ACS, and + influenza A on oseltsmsavir.  TTE confirms HFpEF but also moderate to severe aortic stenosis.  Will continue IV furosemide and metoprolol.  Her INR is subtherapeutic and she is currently on enoxaparin.  The patient will ultimately require a repeat ablation and a possible TAVR

## 2024-05-01 NOTE — CONSULT NOTE ADULT - SUBJECTIVE AND OBJECTIVE BOX
CHIEF COMPLAINT: Patient is a 73y old  Female who presents with a chief complaint of CHF exacerbation , Flu (30 Apr 2024 15:44)      HPI: HPI:  72 y/o F with PMHx of HTN, DM2, AS, CAD, PVD, HFpEF (55%) Afib on Coumadin s/p ablation presents to the ED BIBEMS for progressive shortness of breath and JETT. Pt states that she just came back from a cruise yesterday and that over the past 2 days she has felt increasingly short of breath. She notes she had a fever of 101 at home and has noticed worsening LE edema. Pt denies CP, abdominal pain but does admit to nausea and diarrhea last night. She notes that for the past 2 days she has had very little PO intake since feeling ill. Pt's  who she came in with is also sick. In the ED pt was found to be Flu A  positive and with increased WOB, BNP of 1800 was placed on Bipap and found to be in Afib w RVR. ICU consulted for the above.  (29 Apr 2024 23:00)      PMHx: PAST MEDICAL & SURGICAL HISTORY:  Diabetes mellitus type II, controlled      Atrial fibrillation      Congestive heart failure      Dyspnea      Morbid obesity with BMI of 40.0-44.9, adult      Neuropathy      Peripheral venous insufficiency      Thyroid nodule      HTN (hypertension)      Cellulitis      At risk for sleep apnea      History of esophagogastroduodenoscopy (EGD)      H/O colonoscopy      Other complications of gastric band procedure      S/P left knee arthroscopy      Status post medial meniscus repair      History of cholecystectomy      S/P cataract surgery            Soc Hx:     FAMILY HISTORY:  Family history of breast cancer in mother    Family history of diabetes mellitus in mother    FHx: heart disease (Sibling)        Allergies: Allergies    penicillin (Hives; Urticaria)  PC Pen VK (Rash)  latex (Unknown)  [This allergen will not trigger allergy alert] IV DYE, IODINE CONTRAST (Unknown)    Intolerances          REVIEW OF SYSTEMS:    As above  No chest pain + shortness of breath  No lightheadeness or syncope  No leg swelling  No palpitations  No claudication-like symptoms    Vital Signs Last 24 Hrs  T(C): 37.1 (01 May 2024 05:57), Max: 39.1 (01 May 2024 02:56)  T(F): 98.7 (01 May 2024 05:57), Max: 102.3 (01 May 2024 02:56)  HR: 122 (01 May 2024 05:57) (95 - 152)  BP: 103/83 (01 May 2024 05:57) (84/72 - 140/86)  BP(mean): 98 (30 Apr 2024 20:00) (74 - 109)  RR: 18 (01 May 2024 05:57) (18 - 26)  SpO2: 98% (01 May 2024 05:57) (96% - 100%)    Parameters below as of 01 May 2024 05:57  Patient On (Oxygen Delivery Method): nasal cannula  O2 Flow (L/min): 3      I&O's Summary    30 Apr 2024 07:01  -  01 May 2024 07:00  --------------------------------------------------------  IN: 940 mL / OUT: 1800 mL / NET: -860 mL        CAPILLARY BLOOD GLUCOSE      POCT Blood Glucose.: 143 mg/dL (30 Apr 2024 21:31)  POCT Blood Glucose.: 141 mg/dL (30 Apr 2024 16:25)  POCT Blood Glucose.: 134 mg/dL (30 Apr 2024 11:46)      PHYSICAL EXAM:   Patient in NAD  Neck: No JVD; Carotids:  2+ without bruits  Respiratory:  Clear to A&P  Cardiovascular: S1 and S2, regular rate and rhythm, no Murmurs, gallops or rubs  Gastrointestinal:  Soft, non-tender; BS positive  Extremities: No peripheral edema  Vascular: 2+ peripheral pulses  Neurological: A/O x 3, no focal deficits      MEDICATIONS:  MEDICATIONS  (STANDING):  albuterol    0.083% 2.5 milliGRAM(s) Nebulizer every 6 hours  atorvastatin 10 milliGRAM(s) Oral at bedtime  chlorhexidine 4% Liquid 1 Application(s) Topical daily  dextrose 10% Bolus 125 milliLiter(s) IV Bolus once  dextrose 5%. 1000 milliLiter(s) (50 mL/Hr) IV Continuous <Continuous>  dextrose 50% Injectable 25 Gram(s) IV Push once  enoxaparin Injectable 120 milliGRAM(s) SubCutaneous every 12 hours  furosemide   Injectable 40 milliGRAM(s) IV Push daily  gabapentin 800 milliGRAM(s) Oral three times a day  glucagon  Injectable 1 milliGRAM(s) IntraMuscular once  guaiFENesin  milliGRAM(s) Oral every 12 hours  influenza  Vaccine (HIGH DOSE) 0.7 milliLiter(s) IntraMuscular once  insulin lispro (ADMELOG) corrective regimen sliding scale   SubCutaneous Before meals and at bedtime  metoprolol succinate ER 50 milliGRAM(s) Oral daily  montelukast 10 milliGRAM(s) Oral at bedtime  oseltamivir 75 milliGRAM(s) Oral two times a day  predniSONE   Tablet 5 milliGRAM(s) Oral daily    Home Medications:  Acidophilus oral tablet: 1 tab(s) orally once a day (29 Apr 2024 22:44)  cholestyramine 4 g/5 g oral powder for reconstitution: 1 packet(s) orally once a day (in the morning) (29 Apr 2024 22:42)  ezetimibe 10 mg oral tablet: 1 tab(s) orally once a day (in the evening) (29 Apr 2024 22:43)  gabapentin 800 mg oral tablet: 1 tab(s) orally 3 times a day (29 Apr 2024 22:44)  losartan 25 mg oral tablet: 1 tab(s) orally once a day (29 Apr 2024 22:43)  metFORMIN 1000 mg oral tablet: 1 tab(s) orally 2 times a day (29 Apr 2024 22:43)  metoprolol succinate 50 mg oral tablet, extended release: 1 tab(s) orally once a day (29 Apr 2024 22:43)  montelukast 10 mg oral tablet: 1 tab(s) orally once a day (at bedtime) (29 Apr 2024 22:43)  pravastatin 10 mg oral tablet: 1 tab(s) orally once a day (in the evening) (29 Apr 2024 22:43)  predniSONE 5 mg oral tablet: 1 tab(s) orally once a day (29 Apr 2024 22:43)  Vitamin D3 25 mcg (1000 intl units) oral capsule: 1 cap(s) orally once a day (29 Apr 2024 22:44)  warfarin 5 mg oral tablet: 1 tab(s) orally once a day (at bedtime) (29 Apr 2024 22:44)  zolpidem 10 mg oral tablet: 1 tab(s) orally once a day (at bedtime) (29 Apr 2024 22:43)        LABS: All Labs Reviewed:  Blood Culture: Organism --  Gram Stain Blood -- Gram Stain --  Specimen Source Clean Catch None  Culture-Blood --    Organism --  Gram Stain Blood -- Gram Stain --  Specimen Source .Blood None  Culture-Blood --      BNP Pro-Brain Natriuretic Peptide (04.29.24 @ 19:22) Pro-Brain Natriuretic Peptide: 1882 pg/mL  CBC                         Hemoglobin: 12.0 g/dL (04-30 @ 05:44)  Hemoglobin: 12.6 g/dL (04-29 @ 19:22)              Hematocrit: 37.7 % (04-30 @ 05:44)  Hematocrit: 38.7 % (04-29 @ 19:22)              Mean Cell Volume: 95.4 fl (04-30 @ 05:44)  Mean Cell Volume: 93.3 fl (04-29 @ 19:22)              Platelet Count - Automated: 267 K/uL (04-30 @ 05:44)  Platelet Count - Automated: 277 K/uL (04-29 @ 19:22)                            Cardiac markers   Troponin I, High Sensitivity (04.29.24 @ 19:22) Troponin I, High Sensitivity Result: 16.14                                       Chems        Sodium: 134 mmol/L (05-01 @ 03:33)  Sodium: 135 mmol/L (04-30 @ 05:44)  Sodium: 137 mmol/L (04-29 @ 19:22)          Potassium: 3.7 mmol/L (05-01 @ 03:33)  Potassium: 3.8 mmol/L (04-30 @ 05:44)  Potassium: 3.7 mmol/L (04-29 @ 19:22)          Blood Urea Nitrogen: 16 mg/dL (05-01 @ 03:33)  Blood Urea Nitrogen: 13 mg/dL (04-30 @ 05:44)  Blood Urea Nitrogen: 14 mg/dL (04-29 @ 19:22)          Creatinine 0.73  Creatinine 0.72  Creatinine 0.70          Magnesium: 1.8 mg/dL (05-01 @ 03:33)  Magnesium: 2.0 mg/dL (04-30 @ 05:44)  Magnesium: 1.4 mg/dL (04-29 @ 23:47)          Protein Total: 6.8 gm/dL (04-30 @ 05:44)  Protein Total: 7.3 gm/dL (04-29 @ 19:22)                  Calcium: 8.4 mg/dL (05-01 @ 03:33)  Calcium: 8.8 mg/dL (04-30 @ 05:44)  Calcium: 8.9 mg/dL (04-29 @ 19:22)          Phosphorus: 3.3 mg/dL (05-01 @ 03:33)  Phosphorus: 4.1 mg/dL (04-30 @ 05:44)  Phosphorus: 3.9 mg/dL (04-29 @ 23:47)          Bilirubin Total: 0.5 mg/dL (04-30 @ 05:44)  Bilirubin Total: 0.6 mg/dL (04-29 @ 19:22)          Alanine Aminotransferase (ALT/SGPT): 45 U/L (04-30 @ 05:44)  Alanine Aminotransferase (ALT/SGPT): 44 U/L (04-29 @ 19:22)          Aspartate Aminotransferase (AST/SGOT): 37 U/L (04-30 @ 05:44)  Aspartate Aminotransferase (AST/SGOT): 33 U/L (04-29 @ 19:22)                 INR: 1.36 ratio (04-30 @ 05:44)  INR: 1.50 ratio (04-29 @ 19:22)             RADIOLOGY:    EKG:  Atrial fibrillation with a rapid VR    Telemetry:  Atrial fibrillation    ECHO: < from: TTE W or WO Ultrasound Enhancing Agent (04.30.24 @ 07:15) >  CONCLUSIONS:      1. Left ventricular cavity is normal in size. Left ventricular wall thickness is normal. Left ventricular systolic function is normal with an ejection fraction visually estimated at 55 to 60 %.   2. The left atrium is moderately dilated.   3. The right atrium is dilated.   4. Mild mitral regurgitation.   5. Mild to moderate tricuspid regurgitation.   6. Mild pulmonary hypertension.   7. Moderate to severe aortic stenosis.   8. There is moderate calcification of the mitral valve annulus.    < end of copied text >         CHIEF COMPLAINT: Patient is a 73y old  Female who presents with a chief complaint of CHF exacerbation , Flu (30 Apr 2024 15:44)      HPI: HPI:  72 y/o F with PMHx of HTN, DM2, AS, CAD, PVD, HFpEF (55%) Afib on Coumadin s/p ablation presents to the ED BIBEMS for progressive shortness of breath and JETT. Pt states that she just came back from a cruise yesterday and that over the past 2 days she has felt increasingly short of breath. She notes she had a fever of 101 at home and has noticed worsening LE edema. Pt denies CP, abdominal pain but does admit to nausea and diarrhea last night. She notes that for the past 2 days she has had very little PO intake since feeling ill. Pt's  who she came in with is also sick. In the ED pt was found to be Flu A  positive and with increased WOB, BNP of 1800 was placed on Bipap and found to be in Afib w RVR. ICU consulted for the above.  (29 Apr 2024 23:00)      PMHx: PAST MEDICAL & SURGICAL HISTORY:  Diabetes mellitus type II, controlled      Atrial fibrillation      Congestive heart failure      Dyspnea      Morbid obesity with BMI of 40.0-44.9, adult      Neuropathy      Peripheral venous insufficiency      Thyroid nodule      HTN (hypertension)      Cellulitis      At risk for sleep apnea      History of esophagogastroduodenoscopy (EGD)      H/O colonoscopy      Other complications of gastric band procedure      S/P left knee arthroscopy      Status post medial meniscus repair      History of cholecystectomy      S/P cataract surgery            Soc Hx:     FAMILY HISTORY:  Family history of breast cancer in mother    Family history of diabetes mellitus in mother    FHx: heart disease (Sibling)        Allergies: Allergies    penicillin (Hives; Urticaria)  PC Pen VK (Rash)  latex (Unknown)  [This allergen will not trigger allergy alert] IV DYE, IODINE CONTRAST (Unknown)    Intolerances          REVIEW OF SYSTEMS:    As above  No chest pain + shortness of breath  No lightheadeness or syncope  No leg swelling  No palpitations  No claudication-like symptoms    Vital Signs Last 24 Hrs  T(C): 37.1 (01 May 2024 05:57), Max: 39.1 (01 May 2024 02:56)  T(F): 98.7 (01 May 2024 05:57), Max: 102.3 (01 May 2024 02:56)  HR: 122 (01 May 2024 05:57) (95 - 152)  BP: 103/83 (01 May 2024 05:57) (84/72 - 140/86)  BP(mean): 98 (30 Apr 2024 20:00) (74 - 109)  RR: 18 (01 May 2024 05:57) (18 - 26)  SpO2: 98% (01 May 2024 05:57) (96% - 100%)    Parameters below as of 01 May 2024 05:57  Patient On (Oxygen Delivery Method): nasal cannula  O2 Flow (L/min): 3      I&O's Summary    30 Apr 2024 07:01  -  01 May 2024 07:00  --------------------------------------------------------  IN: 940 mL / OUT: 1800 mL / NET: -860 mL        CAPILLARY BLOOD GLUCOSE      POCT Blood Glucose.: 143 mg/dL (30 Apr 2024 21:31)  POCT Blood Glucose.: 141 mg/dL (30 Apr 2024 16:25)  POCT Blood Glucose.: 134 mg/dL (30 Apr 2024 11:46)      PHYSICAL EXAM:   Patient in NAD  Neck: No JVD; Carotids:  2+ without bruits  Respiratory:  Diffuse rhonchi  Cardiovascular: S1 and S2, iregular rate and rhythm, no Murmurs, gallops or rubs        MEDICATIONS:  MEDICATIONS  (STANDING):  albuterol    0.083% 2.5 milliGRAM(s) Nebulizer every 6 hours  atorvastatin 10 milliGRAM(s) Oral at bedtime  chlorhexidine 4% Liquid 1 Application(s) Topical daily  dextrose 10% Bolus 125 milliLiter(s) IV Bolus once  dextrose 5%. 1000 milliLiter(s) (50 mL/Hr) IV Continuous <Continuous>  dextrose 50% Injectable 25 Gram(s) IV Push once  enoxaparin Injectable 120 milliGRAM(s) SubCutaneous every 12 hours  furosemide   Injectable 40 milliGRAM(s) IV Push daily  gabapentin 800 milliGRAM(s) Oral three times a day  glucagon  Injectable 1 milliGRAM(s) IntraMuscular once  guaiFENesin  milliGRAM(s) Oral every 12 hours  influenza  Vaccine (HIGH DOSE) 0.7 milliLiter(s) IntraMuscular once  insulin lispro (ADMELOG) corrective regimen sliding scale   SubCutaneous Before meals and at bedtime  metoprolol succinate ER 50 milliGRAM(s) Oral daily  montelukast 10 milliGRAM(s) Oral at bedtime  oseltamivir 75 milliGRAM(s) Oral two times a day  predniSONE   Tablet 5 milliGRAM(s) Oral daily    Home Medications:  Acidophilus oral tablet: 1 tab(s) orally once a day (29 Apr 2024 22:44)  cholestyramine 4 g/5 g oral powder for reconstitution: 1 packet(s) orally once a day (in the morning) (29 Apr 2024 22:42)  ezetimibe 10 mg oral tablet: 1 tab(s) orally once a day (in the evening) (29 Apr 2024 22:43)  gabapentin 800 mg oral tablet: 1 tab(s) orally 3 times a day (29 Apr 2024 22:44)  losartan 25 mg oral tablet: 1 tab(s) orally once a day (29 Apr 2024 22:43)  metFORMIN 1000 mg oral tablet: 1 tab(s) orally 2 times a day (29 Apr 2024 22:43)  metoprolol succinate 50 mg oral tablet, extended release: 1 tab(s) orally once a day (29 Apr 2024 22:43)  montelukast 10 mg oral tablet: 1 tab(s) orally once a day (at bedtime) (29 Apr 2024 22:43)  pravastatin 10 mg oral tablet: 1 tab(s) orally once a day (in the evening) (29 Apr 2024 22:43)  predniSONE 5 mg oral tablet: 1 tab(s) orally once a day (29 Apr 2024 22:43)  Vitamin D3 25 mcg (1000 intl units) oral capsule: 1 cap(s) orally once a day (29 Apr 2024 22:44)  warfarin 5 mg oral tablet: 1 tab(s) orally once a day (at bedtime) (29 Apr 2024 22:44)  zolpidem 10 mg oral tablet: 1 tab(s) orally once a day (at bedtime) (29 Apr 2024 22:43)        LABS: All Labs Reviewed:  Blood Culture: Organism --  Gram Stain Blood -- Gram Stain --  Specimen Source Clean Catch None  Culture-Blood --    Organism --  Gram Stain Blood -- Gram Stain --  Specimen Source .Blood None  Culture-Blood --      BNP Pro-Brain Natriuretic Peptide (04.29.24 @ 19:22) Pro-Brain Natriuretic Peptide: 1882 pg/mL  CBC                         Hemoglobin: 12.0 g/dL (04-30 @ 05:44)  Hemoglobin: 12.6 g/dL (04-29 @ 19:22)              Hematocrit: 37.7 % (04-30 @ 05:44)  Hematocrit: 38.7 % (04-29 @ 19:22)              Mean Cell Volume: 95.4 fl (04-30 @ 05:44)  Mean Cell Volume: 93.3 fl (04-29 @ 19:22)              Platelet Count - Automated: 267 K/uL (04-30 @ 05:44)  Platelet Count - Automated: 277 K/uL (04-29 @ 19:22)                            Cardiac markers   Troponin I, High Sensitivity (04.29.24 @ 19:22) Troponin I, High Sensitivity Result: 16.14                                       Chems        Sodium: 134 mmol/L (05-01 @ 03:33)  Sodium: 135 mmol/L (04-30 @ 05:44)  Sodium: 137 mmol/L (04-29 @ 19:22)          Potassium: 3.7 mmol/L (05-01 @ 03:33)  Potassium: 3.8 mmol/L (04-30 @ 05:44)  Potassium: 3.7 mmol/L (04-29 @ 19:22)          Blood Urea Nitrogen: 16 mg/dL (05-01 @ 03:33)  Blood Urea Nitrogen: 13 mg/dL (04-30 @ 05:44)  Blood Urea Nitrogen: 14 mg/dL (04-29 @ 19:22)          Creatinine 0.73  Creatinine 0.72  Creatinine 0.70          Magnesium: 1.8 mg/dL (05-01 @ 03:33)  Magnesium: 2.0 mg/dL (04-30 @ 05:44)  Magnesium: 1.4 mg/dL (04-29 @ 23:47)          Protein Total: 6.8 gm/dL (04-30 @ 05:44)  Protein Total: 7.3 gm/dL (04-29 @ 19:22)                  Calcium: 8.4 mg/dL (05-01 @ 03:33)  Calcium: 8.8 mg/dL (04-30 @ 05:44)  Calcium: 8.9 mg/dL (04-29 @ 19:22)          Phosphorus: 3.3 mg/dL (05-01 @ 03:33)  Phosphorus: 4.1 mg/dL (04-30 @ 05:44)  Phosphorus: 3.9 mg/dL (04-29 @ 23:47)          Bilirubin Total: 0.5 mg/dL (04-30 @ 05:44)  Bilirubin Total: 0.6 mg/dL (04-29 @ 19:22)          Alanine Aminotransferase (ALT/SGPT): 45 U/L (04-30 @ 05:44)  Alanine Aminotransferase (ALT/SGPT): 44 U/L (04-29 @ 19:22)          Aspartate Aminotransferase (AST/SGOT): 37 U/L (04-30 @ 05:44)  Aspartate Aminotransferase (AST/SGOT): 33 U/L (04-29 @ 19:22)                 INR: 1.36 ratio (04-30 @ 05:44)  INR: 1.50 ratio (04-29 @ 19:22)             RADIOLOGY:    EKG:  Atrial fibrillation with a rapid VR    Telemetry:  Atrial fibrillation    ECHO: < from: TTE W or WO Ultrasound Enhancing Agent (04.30.24 @ 07:15) >  CONCLUSIONS:      1. Left ventricular cavity is normal in size. Left ventricular wall thickness is normal. Left ventricular systolic function is normal with an ejection fraction visually estimated at 55 to 60 %.   2. The left atrium is moderately dilated.   3. The right atrium is dilated.   4. Mild mitral regurgitation.   5. Mild to moderate tricuspid regurgitation.   6. Mild pulmonary hypertension.   7. Moderate to severe aortic stenosis.   8. There is moderate calcification of the mitral valve annulus.    < end of copied text >         CHIEF COMPLAINT: Patient is a 73y old  Female who presents with a chief complaint of CHF exacerbation , Flu (30 Apr 2024 15:44)      HPI: HPI:  72 y/o F with PMHx of HTN, DM2, AS, CAD, PVD, HFpEF (55%) Afib on Coumadin s/p ablation presents to the ED BIBEMS for progressive shortness of breath and JETT. Pt states that she just came back from a cruise yesterday and that over the past 2 days she has felt increasingly short of breath. She notes she had a fever of 101 at home and has noticed worsening LE edema. Pt denies CP, abdominal pain but does admit to nausea and diarrhea last night. She notes that for the past 2 days she has had very little PO intake since feeling ill. Pt's  who she came in with is also sick. In the ED pt was found to be Flu A  positive and with increased WOB, BNP of 1800 was placed on Bipap and found to be in Afib w RVR. ICU consulted for the above.  (29 Apr 2024 23:00)      PMHx: PAST MEDICAL & SURGICAL HISTORY:  Diabetes mellitus type II, controlled      Atrial fibrillation      Congestive heart failure      Dyspnea      Morbid obesity with BMI of 40.0-44.9, adult      Neuropathy      Peripheral venous insufficiency      Thyroid nodule      HTN (hypertension)      Cellulitis      At risk for sleep apnea      History of esophagogastroduodenoscopy (EGD)      H/O colonoscopy      Other complications of gastric band procedure      S/P left knee arthroscopy      Status post medial meniscus repair      History of cholecystectomy      S/P cataract surgery            Soc Hx:     FAMILY HISTORY:  Family history of breast cancer in mother    Family history of diabetes mellitus in mother    FHx: heart disease (Sibling)        Allergies: Allergies    penicillin (Hives; Urticaria)  PC Pen VK (Rash)  latex (Unknown)  [This allergen will not trigger allergy alert] IV DYE, IODINE CONTRAST (Unknown)    Intolerances          REVIEW OF SYSTEMS:    As above  No chest pain + shortness of breath  No lightheadeness or syncope  No leg swelling  No palpitations  No claudication-like symptoms    Vital Signs Last 24 Hrs  T(C): 37.1 (01 May 2024 05:57), Max: 39.1 (01 May 2024 02:56)  T(F): 98.7 (01 May 2024 05:57), Max: 102.3 (01 May 2024 02:56)  HR: 122 (01 May 2024 05:57) (95 - 152)  BP: 103/83 (01 May 2024 05:57) (84/72 - 140/86)  BP(mean): 98 (30 Apr 2024 20:00) (74 - 109)  RR: 18 (01 May 2024 05:57) (18 - 26)  SpO2: 98% (01 May 2024 05:57) (96% - 100%)    Parameters below as of 01 May 2024 05:57  Patient On (Oxygen Delivery Method): nasal cannula  O2 Flow (L/min): 3      I&O's Summary    30 Apr 2024 07:01  -  01 May 2024 07:00  --------------------------------------------------------  IN: 940 mL / OUT: 1800 mL / NET: -860 mL        CAPILLARY BLOOD GLUCOSE      POCT Blood Glucose.: 143 mg/dL (30 Apr 2024 21:31)  POCT Blood Glucose.: 141 mg/dL (30 Apr 2024 16:25)  POCT Blood Glucose.: 134 mg/dL (30 Apr 2024 11:46)      PHYSICAL EXAM:   Patient in mild distrress  Neck: No JVD; Carotids:  2+ without bruits  Respiratory:  Diffuse rhonchi  Cardiovascular: S1 and S2, iregular rate and rhythm + syst m        MEDICATIONS:  MEDICATIONS  (STANDING):  albuterol    0.083% 2.5 milliGRAM(s) Nebulizer every 6 hours  atorvastatin 10 milliGRAM(s) Oral at bedtime  chlorhexidine 4% Liquid 1 Application(s) Topical daily  dextrose 10% Bolus 125 milliLiter(s) IV Bolus once  dextrose 5%. 1000 milliLiter(s) (50 mL/Hr) IV Continuous <Continuous>  dextrose 50% Injectable 25 Gram(s) IV Push once  enoxaparin Injectable 120 milliGRAM(s) SubCutaneous every 12 hours  furosemide   Injectable 40 milliGRAM(s) IV Push daily  gabapentin 800 milliGRAM(s) Oral three times a day  glucagon  Injectable 1 milliGRAM(s) IntraMuscular once  guaiFENesin  milliGRAM(s) Oral every 12 hours  influenza  Vaccine (HIGH DOSE) 0.7 milliLiter(s) IntraMuscular once  insulin lispro (ADMELOG) corrective regimen sliding scale   SubCutaneous Before meals and at bedtime  metoprolol succinate ER 50 milliGRAM(s) Oral daily  montelukast 10 milliGRAM(s) Oral at bedtime  oseltamivir 75 milliGRAM(s) Oral two times a day  predniSONE   Tablet 5 milliGRAM(s) Oral daily    Home Medications:  Acidophilus oral tablet: 1 tab(s) orally once a day (29 Apr 2024 22:44)  cholestyramine 4 g/5 g oral powder for reconstitution: 1 packet(s) orally once a day (in the morning) (29 Apr 2024 22:42)  ezetimibe 10 mg oral tablet: 1 tab(s) orally once a day (in the evening) (29 Apr 2024 22:43)  gabapentin 800 mg oral tablet: 1 tab(s) orally 3 times a day (29 Apr 2024 22:44)  losartan 25 mg oral tablet: 1 tab(s) orally once a day (29 Apr 2024 22:43)  metFORMIN 1000 mg oral tablet: 1 tab(s) orally 2 times a day (29 Apr 2024 22:43)  metoprolol succinate 50 mg oral tablet, extended release: 1 tab(s) orally once a day (29 Apr 2024 22:43)  montelukast 10 mg oral tablet: 1 tab(s) orally once a day (at bedtime) (29 Apr 2024 22:43)  pravastatin 10 mg oral tablet: 1 tab(s) orally once a day (in the evening) (29 Apr 2024 22:43)  predniSONE 5 mg oral tablet: 1 tab(s) orally once a day (29 Apr 2024 22:43)  Vitamin D3 25 mcg (1000 intl units) oral capsule: 1 cap(s) orally once a day (29 Apr 2024 22:44)  warfarin 5 mg oral tablet: 1 tab(s) orally once a day (at bedtime) (29 Apr 2024 22:44)  zolpidem 10 mg oral tablet: 1 tab(s) orally once a day (at bedtime) (29 Apr 2024 22:43)        LABS: All Labs Reviewed:  Blood Culture: Organism --  Gram Stain Blood -- Gram Stain --  Specimen Source Clean Catch None  Culture-Blood --    Organism --  Gram Stain Blood -- Gram Stain --  Specimen Source .Blood None  Culture-Blood --      BNP Pro-Brain Natriuretic Peptide (04.29.24 @ 19:22) Pro-Brain Natriuretic Peptide: 1882 pg/mL  CBC                         Hemoglobin: 12.0 g/dL (04-30 @ 05:44)  Hemoglobin: 12.6 g/dL (04-29 @ 19:22)              Hematocrit: 37.7 % (04-30 @ 05:44)  Hematocrit: 38.7 % (04-29 @ 19:22)              Mean Cell Volume: 95.4 fl (04-30 @ 05:44)  Mean Cell Volume: 93.3 fl (04-29 @ 19:22)              Platelet Count - Automated: 267 K/uL (04-30 @ 05:44)  Platelet Count - Automated: 277 K/uL (04-29 @ 19:22)                            Cardiac markers   Troponin I, High Sensitivity (04.29.24 @ 19:22) Troponin I, High Sensitivity Result: 16.14                                       Chems        Sodium: 134 mmol/L (05-01 @ 03:33)  Sodium: 135 mmol/L (04-30 @ 05:44)  Sodium: 137 mmol/L (04-29 @ 19:22)          Potassium: 3.7 mmol/L (05-01 @ 03:33)  Potassium: 3.8 mmol/L (04-30 @ 05:44)  Potassium: 3.7 mmol/L (04-29 @ 19:22)          Blood Urea Nitrogen: 16 mg/dL (05-01 @ 03:33)  Blood Urea Nitrogen: 13 mg/dL (04-30 @ 05:44)  Blood Urea Nitrogen: 14 mg/dL (04-29 @ 19:22)          Creatinine 0.73  Creatinine 0.72  Creatinine 0.70          Magnesium: 1.8 mg/dL (05-01 @ 03:33)  Magnesium: 2.0 mg/dL (04-30 @ 05:44)  Magnesium: 1.4 mg/dL (04-29 @ 23:47)          Protein Total: 6.8 gm/dL (04-30 @ 05:44)  Protein Total: 7.3 gm/dL (04-29 @ 19:22)                  Calcium: 8.4 mg/dL (05-01 @ 03:33)  Calcium: 8.8 mg/dL (04-30 @ 05:44)  Calcium: 8.9 mg/dL (04-29 @ 19:22)          Phosphorus: 3.3 mg/dL (05-01 @ 03:33)  Phosphorus: 4.1 mg/dL (04-30 @ 05:44)  Phosphorus: 3.9 mg/dL (04-29 @ 23:47)          Bilirubin Total: 0.5 mg/dL (04-30 @ 05:44)  Bilirubin Total: 0.6 mg/dL (04-29 @ 19:22)          Alanine Aminotransferase (ALT/SGPT): 45 U/L (04-30 @ 05:44)  Alanine Aminotransferase (ALT/SGPT): 44 U/L (04-29 @ 19:22)          Aspartate Aminotransferase (AST/SGOT): 37 U/L (04-30 @ 05:44)  Aspartate Aminotransferase (AST/SGOT): 33 U/L (04-29 @ 19:22)                 INR: 1.36 ratio (04-30 @ 05:44)  INR: 1.50 ratio (04-29 @ 19:22)             RADIOLOGY:    EKG:  Atrial fibrillation with a rapid VR    Telemetry:  Atrial fibrillation    ECHO: < from: TTE W or WO Ultrasound Enhancing Agent (04.30.24 @ 07:15) >  CONCLUSIONS:      1. Left ventricular cavity is normal in size. Left ventricular wall thickness is normal. Left ventricular systolic function is normal with an ejection fraction visually estimated at 55 to 60 %.   2. The left atrium is moderately dilated.   3. The right atrium is dilated.   4. Mild mitral regurgitation.   5. Mild to moderate tricuspid regurgitation.   6. Mild pulmonary hypertension.   7. Moderate to severe aortic stenosis.   8. There is moderate calcification of the mitral valve annulus.    < end of copied text >

## 2024-05-02 LAB
-  AMOXICILLIN/CLAVULANIC ACID: SIGNIFICANT CHANGE UP
-  AMPICILLIN/SULBACTAM: SIGNIFICANT CHANGE UP
-  AMPICILLIN: SIGNIFICANT CHANGE UP
-  AZTREONAM: SIGNIFICANT CHANGE UP
-  CEFAZOLIN: SIGNIFICANT CHANGE UP
-  CEFEPIME: SIGNIFICANT CHANGE UP
-  CEFTRIAXONE: SIGNIFICANT CHANGE UP
-  CEFUROXIME: SIGNIFICANT CHANGE UP
-  CIPROFLOXACIN: SIGNIFICANT CHANGE UP
-  ERTAPENEM: SIGNIFICANT CHANGE UP
-  GENTAMICIN: SIGNIFICANT CHANGE UP
-  IMIPENEM: SIGNIFICANT CHANGE UP
-  LEVOFLOXACIN: SIGNIFICANT CHANGE UP
-  MEROPENEM: SIGNIFICANT CHANGE UP
-  NITROFURANTOIN: SIGNIFICANT CHANGE UP
-  PIPERACILLIN/TAZOBACTAM: SIGNIFICANT CHANGE UP
-  TOBRAMYCIN: SIGNIFICANT CHANGE UP
-  TRIMETHOPRIM/SULFAMETHOXAZOLE: SIGNIFICANT CHANGE UP
ANION GAP SERPL CALC-SCNC: 5 MMOL/L — SIGNIFICANT CHANGE UP (ref 5–17)
APTT BLD: 47 SEC — HIGH (ref 24.5–35.6)
BUN SERPL-MCNC: 18 MG/DL — SIGNIFICANT CHANGE UP (ref 7–23)
CALCIUM SERPL-MCNC: 8.5 MG/DL — SIGNIFICANT CHANGE UP (ref 8.5–10.1)
CHLORIDE SERPL-SCNC: 100 MMOL/L — SIGNIFICANT CHANGE UP (ref 96–108)
CO2 SERPL-SCNC: 30 MMOL/L — SIGNIFICANT CHANGE UP (ref 22–31)
CREAT SERPL-MCNC: 0.63 MG/DL — SIGNIFICANT CHANGE UP (ref 0.5–1.3)
CULTURE RESULTS: ABNORMAL
EGFR: 93 ML/MIN/1.73M2 — SIGNIFICANT CHANGE UP
GLUCOSE BLDC GLUCOMTR-MCNC: 137 MG/DL — HIGH (ref 70–99)
GLUCOSE BLDC GLUCOMTR-MCNC: 148 MG/DL — HIGH (ref 70–99)
GLUCOSE BLDC GLUCOMTR-MCNC: 224 MG/DL — HIGH (ref 70–99)
GLUCOSE BLDC GLUCOMTR-MCNC: 242 MG/DL — HIGH (ref 70–99)
GLUCOSE SERPL-MCNC: 135 MG/DL — HIGH (ref 70–99)
HCT VFR BLD CALC: 39.2 % — SIGNIFICANT CHANGE UP (ref 34.5–45)
HGB BLD-MCNC: 12.4 G/DL — SIGNIFICANT CHANGE UP (ref 11.5–15.5)
INR BLD: 1.72 RATIO — HIGH (ref 0.85–1.18)
MCHC RBC-ENTMCNC: 29.8 PG — SIGNIFICANT CHANGE UP (ref 27–34)
MCHC RBC-ENTMCNC: 31.6 GM/DL — LOW (ref 32–36)
MCV RBC AUTO: 94.2 FL — SIGNIFICANT CHANGE UP (ref 80–100)
METHOD TYPE: SIGNIFICANT CHANGE UP
ORGANISM # SPEC MICROSCOPIC CNT: ABNORMAL
ORGANISM # SPEC MICROSCOPIC CNT: SIGNIFICANT CHANGE UP
PLATELET # BLD AUTO: 225 K/UL — SIGNIFICANT CHANGE UP (ref 150–400)
POTASSIUM SERPL-MCNC: 3.6 MMOL/L — SIGNIFICANT CHANGE UP (ref 3.5–5.3)
POTASSIUM SERPL-SCNC: 3.6 MMOL/L — SIGNIFICANT CHANGE UP (ref 3.5–5.3)
PROTHROM AB SERPL-ACNC: 19.1 SEC — HIGH (ref 9.5–13)
RBC # BLD: 4.16 M/UL — SIGNIFICANT CHANGE UP (ref 3.8–5.2)
RBC # FLD: 13.4 % — SIGNIFICANT CHANGE UP (ref 10.3–14.5)
SODIUM SERPL-SCNC: 135 MMOL/L — SIGNIFICANT CHANGE UP (ref 135–145)
SPECIMEN SOURCE: SIGNIFICANT CHANGE UP
WBC # BLD: 5.74 K/UL — SIGNIFICANT CHANGE UP (ref 3.8–10.5)
WBC # FLD AUTO: 5.74 K/UL — SIGNIFICANT CHANGE UP (ref 3.8–10.5)

## 2024-05-02 PROCEDURE — 99497 ADVNCD CARE PLAN 30 MIN: CPT | Mod: 25

## 2024-05-02 PROCEDURE — 99232 SBSQ HOSP IP/OBS MODERATE 35: CPT

## 2024-05-02 PROCEDURE — 99222 1ST HOSP IP/OBS MODERATE 55: CPT

## 2024-05-02 RX ORDER — ACETAMINOPHEN 500 MG
1000 TABLET ORAL ONCE
Refills: 0 | Status: COMPLETED | OUTPATIENT
Start: 2024-05-02 | End: 2024-05-02

## 2024-05-02 RX ORDER — ONDANSETRON 8 MG/1
4 TABLET, FILM COATED ORAL ONCE
Refills: 0 | Status: COMPLETED | OUTPATIENT
Start: 2024-05-02 | End: 2024-05-02

## 2024-05-02 RX ORDER — CHOLESTYRAMINE 4 G/9G
4 POWDER, FOR SUSPENSION ORAL DAILY
Refills: 0 | Status: DISCONTINUED | OUTPATIENT
Start: 2024-05-02 | End: 2024-05-07

## 2024-05-02 RX ORDER — ZOLPIDEM TARTRATE 10 MG/1
5 TABLET ORAL ONCE
Refills: 0 | Status: DISCONTINUED | OUTPATIENT
Start: 2024-05-02 | End: 2024-05-02

## 2024-05-02 RX ORDER — METOPROLOL TARTRATE 50 MG
50 TABLET ORAL EVERY 12 HOURS
Refills: 0 | Status: DISCONTINUED | OUTPATIENT
Start: 2024-05-02 | End: 2024-05-07

## 2024-05-02 RX ORDER — IPRATROPIUM/ALBUTEROL SULFATE 18-103MCG
3 AEROSOL WITH ADAPTER (GRAM) INHALATION EVERY 6 HOURS
Refills: 0 | Status: DISCONTINUED | OUTPATIENT
Start: 2024-05-02 | End: 2024-05-07

## 2024-05-02 RX ORDER — POTASSIUM CHLORIDE 20 MEQ
40 PACKET (EA) ORAL DAILY
Refills: 0 | Status: DISCONTINUED | OUTPATIENT
Start: 2024-05-02 | End: 2024-05-07

## 2024-05-02 RX ORDER — LEVALBUTEROL 1.25 MG/.5ML
1.25 SOLUTION, CONCENTRATE RESPIRATORY (INHALATION) EVERY 6 HOURS
Refills: 0 | Status: DISCONTINUED | OUTPATIENT
Start: 2024-05-02 | End: 2024-05-06

## 2024-05-02 RX ADMIN — Medication 100 MILLIGRAM(S): at 03:08

## 2024-05-02 RX ADMIN — Medication 40 MILLIGRAM(S): at 21:57

## 2024-05-02 RX ADMIN — ALBUTEROL 2.5 MILLIGRAM(S): 90 AEROSOL, METERED ORAL at 01:16

## 2024-05-02 RX ADMIN — ENOXAPARIN SODIUM 120 MILLIGRAM(S): 100 INJECTION SUBCUTANEOUS at 09:25

## 2024-05-02 RX ADMIN — ENOXAPARIN SODIUM 120 MILLIGRAM(S): 100 INJECTION SUBCUTANEOUS at 21:56

## 2024-05-02 RX ADMIN — WARFARIN SODIUM 5 MILLIGRAM(S): 2.5 TABLET ORAL at 21:57

## 2024-05-02 RX ADMIN — Medication 600 MILLIGRAM(S): at 09:25

## 2024-05-02 RX ADMIN — CHOLESTYRAMINE 4 GRAM(S): 4 POWDER, FOR SUSPENSION ORAL at 17:54

## 2024-05-02 RX ADMIN — Medication 4 MILLILITER(S): at 21:50

## 2024-05-02 RX ADMIN — Medication 50 MILLIGRAM(S): at 09:25

## 2024-05-02 RX ADMIN — ZOLPIDEM TARTRATE 5 MILLIGRAM(S): 10 TABLET ORAL at 23:22

## 2024-05-02 RX ADMIN — Medication 40 MILLIGRAM(S): at 09:25

## 2024-05-02 RX ADMIN — GABAPENTIN 800 MILLIGRAM(S): 400 CAPSULE ORAL at 21:56

## 2024-05-02 RX ADMIN — Medication 40 MILLIEQUIVALENT(S): at 09:27

## 2024-05-02 RX ADMIN — Medication 100 MILLIGRAM(S): at 13:40

## 2024-05-02 RX ADMIN — Medication 75 MILLIGRAM(S): at 09:25

## 2024-05-02 RX ADMIN — Medication 40 MILLIGRAM(S): at 06:06

## 2024-05-02 RX ADMIN — GABAPENTIN 800 MILLIGRAM(S): 400 CAPSULE ORAL at 06:04

## 2024-05-02 RX ADMIN — Medication 400 MILLIGRAM(S): at 15:06

## 2024-05-02 RX ADMIN — ATORVASTATIN CALCIUM 10 MILLIGRAM(S): 80 TABLET, FILM COATED ORAL at 21:58

## 2024-05-02 RX ADMIN — ONDANSETRON 4 MILLIGRAM(S): 8 TABLET, FILM COATED ORAL at 03:08

## 2024-05-02 RX ADMIN — MONTELUKAST 10 MILLIGRAM(S): 4 TABLET, CHEWABLE ORAL at 21:58

## 2024-05-02 RX ADMIN — Medication 50 MILLIGRAM(S): at 21:57

## 2024-05-02 RX ADMIN — Medication 600 MILLIGRAM(S): at 21:57

## 2024-05-02 RX ADMIN — LEVALBUTEROL 1.25 MILLIGRAM(S): 1.25 SOLUTION, CONCENTRATE RESPIRATORY (INHALATION) at 21:44

## 2024-05-02 RX ADMIN — Medication 75 MILLIGRAM(S): at 21:57

## 2024-05-02 RX ADMIN — Medication 40 MILLIGRAM(S): at 13:41

## 2024-05-02 RX ADMIN — Medication 100 MILLIGRAM(S): at 21:56

## 2024-05-02 NOTE — CONSULT NOTE ADULT - ASSESSMENT
Pt is a 73y old Female with hx of HTN, DM2, AS, CAD, PVD, HFpEF (55%) Afib on Coumadin s/p ablation 2023 presents to the ED BIBEMS for progressive shortness of breath and increased work of breathing admitted for acute hypoxemic respiratory failure due to influenza and acute exacerbation of CHF requiring BiPAP Palliative medicine saw patient to address GOC and advance care planing     Process of Care  --Reviewed dx/treatment problems and alignment with Goals of Care    Physical Aspects of Care  --Pain  patient denies at this time  c/w current managment    --Bowel Regimen  denies constipation  risk for constipation d/t immobility  daily dulcolax    --Dyspnea  - increased work of breathing  - c/w supplemental O2 as needed     --Nausea Vomiting  denies    --Weakness  PT as tolerated     Psychological and Psychiatric Aspects of Care:   --Greif/Bereavment: emotional support provided  --Hx of psychiatric dx: none  -Pastoral Care Available PRN     Social Aspects of Care  -SW involved     Cultural Aspects  -Primary Language: English    Goals of Care:     We discussed Palliative Care team being a supportive team when a patient has ongoing illnesses.  We also discussed that it is not an end of life care service, but can help navigate symptoms and emotional support throughout their hospital stay here.    Ethical and Legal Aspects:  NA    Capacity- pt has capacity     HCP/Surrogate: HCP on file naming Wilbert ( pts ) maria luisa Borges but would like both of her sons involved in all discissiona nd decisions if she were unable to     Code Status- full code - confirmed with patient   MOLST-  Dispo Plan- to return home patient is hopeful     Discussed With: Dr. Isidro coordinated with attending and SW and RN     Time Spent: 60 minutes including the care, coordination and counseling of this patient, 50% of which was spent coordinating and counseling.

## 2024-05-02 NOTE — PROGRESS NOTE ADULT - ASSESSMENT
74 yo female with PMHx of HTN, DM2, AS, CAD, PVD, HFpEF (55%) Afib on Coumadin s/p ablation 2023 presents to the ED BIBEMS for progressive shortness of breath and JETT admitted for acute hypoxemic respiratory failure due to influenza and acute exacerbation of CHF requiring Bipap. Stabilized off bipap on 02 , transferred to tele     # Acute hypoxic resp failure - multifactorial due to 1  # Decompensated HFpEF suspect due to noncompliance with diet  # Viral URI 2/2 + Influenza A   - tamiflu x 5 days   - monitor temps  - f/u blood and urine cultures.   - diurese  - echo    # a.fib with RVR due to the above  - BP mildly elevated. in Afib on monitor rate suboptimal 100-115  - cont toprol 50mg qd.  - INR 1.30  - lovenox 120mg bid to bridge warfarin 5mg qd.    # Hypothyroidism  - Check TSH, t4   74 yo female with PMHx of HTN, DM2, AS, CAD, PVD, HFpEF (55%) Afib on Coumadin s/p ablation 2023 presents to the ED BIBEMS for progressive shortness of breath and JETT admitted for acute hypoxemic respiratory failure due to influenza and acute exacerbation of CHF requiring Bipap. Stabilized off bipap on 02 , transferred to tele     # Acute hypoxic resp failure - multifactorial due to 1  # Decompensated HFpEF suspect due to noncompliance with diet  # Viral URI 2/2 + Influenza A   - tamiflu x 5 days   - monitor temps  - f/u blood and urine cultures  - diurese, currently of Lasix 40 daily  - echo preserved EF. moderate to severe AS    #a.fib with RVR due to the above  - Afib still uncontrolled -130s  - BB increased to Toprol 50 BID  - INR noted, continue Lovenox 120mg bid to bridge warfarin 5mg qd.    # Hypothyroidism  - TSH low but normal t4. Repeat outpatient     DISPO: Pending clinical improvement

## 2024-05-02 NOTE — PROGRESS NOTE ADULT - SUBJECTIVE AND OBJECTIVE BOX
HOSPITALIST PROGRESS NOTE    72 yo female with PMHx of HTN, DM2, AS, CAD, PVD, HFpEF (55%) Afib on Coumadin s/p ablation 2023 presents to the ED BIBEMS for progressive shortness of breath and JETT admitted for acute hypoxemic respiratory failure due to influenza and acute exacerbation of CHF requiring Bipap. Stabilized off bipap on 02 , transferred to tele     SUBJECTIVE / INTERVAL HPI: Patient seen and examined at bedside.     ROS: All 10 systems reviewed and found to be negative with the exception of what has been described above.     VITAL SIGNS:  Vital Signs Last 24 Hrs  T(C): 37.2 (02 May 2024 03:15), Max: 37.2 (02 May 2024 03:15)  T(F): 98.9 (02 May 2024 03:15), Max: 98.9 (02 May 2024 03:15)  HR: 131 (02 May 2024 03:15) (47 - 131)  BP: 110/84 (02 May 2024 03:15) (103/68 - 110/84)  BP(mean): --  RR: 20 (02 May 2024 03:15) (18 - 20)  SpO2: 96% (02 May 2024 03:15) (95% - 99%)    Parameters below as of 02 May 2024 03:15  Patient On (Oxygen Delivery Method): nasal cannula  O2 Flow (L/min): 3    PHYSICAL EXAM:  General: comfortable, no acute distress  HEENT: Atraumatic, no LAD, trachea midline, PERRLA  Cardiovascular: normal s1s2, no murmurs, gallops or fricition rubs  Pulmonary: + congestion / + wheezing  Gastrointestinal: soft non tender non distended, no masses felt, no organomegally  Muscloskeletal: no lower extremity edema, intact bilateral lower extremity pulses  Neurological: CN II-12 intact. No focal weakness  Psychiatrical: normal mood, cooperative  SKIN: no rash, lesions or ulcers    MEDICATIONS:  MEDICATIONS  (STANDING):  acetylcysteine 20%  Inhalation 4 milliLiter(s) Inhalation every 12 hours  albuterol    0.083% 2.5 milliGRAM(s) Nebulizer every 6 hours  albuterol    90 MICROgram(s) HFA Inhaler 1 Puff(s) Inhalation every 4 hours  atorvastatin 10 milliGRAM(s) Oral at bedtime  chlorhexidine 4% Liquid 1 Application(s) Topical daily  dextrose 10% Bolus 125 milliLiter(s) IV Bolus once  dextrose 5%. 1000 milliLiter(s) (50 mL/Hr) IV Continuous <Continuous>  dextrose 50% Injectable 25 Gram(s) IV Push once  enoxaparin Injectable 120 milliGRAM(s) SubCutaneous every 12 hours  furosemide   Injectable 40 milliGRAM(s) IV Push daily  gabapentin 800 milliGRAM(s) Oral three times a day  glucagon  Injectable 1 milliGRAM(s) IntraMuscular once  guaiFENesin  milliGRAM(s) Oral every 12 hours  influenza  Vaccine (HIGH DOSE) 0.7 milliLiter(s) IntraMuscular once  insulin lispro (ADMELOG) corrective regimen sliding scale   SubCutaneous Before meals and at bedtime  methylPREDNISolone sodium succinate Injectable 40 milliGRAM(s) IV Push every 8 hours  metoprolol succinate ER 50 milliGRAM(s) Oral every 12 hours  montelukast 10 milliGRAM(s) Oral at bedtime  oseltamivir 75 milliGRAM(s) Oral two times a day  potassium chloride    Tablet ER 40 milliEquivalent(s) Oral daily  warfarin 5 milliGRAM(s) Oral daily    MEDICATIONS  (PRN):  benzonatate 100 milliGRAM(s) Oral every 8 hours PRN Cough  dextrose Oral Gel 15 Gram(s) Oral once PRN Blood Glucose LESS THAN 70 milliGRAM(s)/deciliter      ALLERGIES:  Allergies    penicillin (Hives; Urticaria)  PC Pen VK (Rash)  latex (Unknown)  [This allergen will not trigger allergy alert] IV DYE, IODINE CONTRAST (Unknown)    Intolerances        LABS:                        12.4   5.74  )-----------( 225      ( 02 May 2024 07:30 )             39.2     05-02    135  |  100  |  18  ----------------------------<  135<H>  3.6   |  30  |  0.63    Ca    8.5      02 May 2024 07:30  Phos  3.4     05-01  Mg     1.9     05-01      PT/INR - ( 02 May 2024 07:30 )   PT: 19.1 sec;   INR: 1.72 ratio         PTT - ( 02 May 2024 07:30 )  PTT:47.0 sec  Urinalysis Basic - ( 02 May 2024 07:30 )    Color: x / Appearance: x / SG: x / pH: x  Gluc: 135 mg/dL / Ketone: x  / Bili: x / Urobili: x   Blood: x / Protein: x / Nitrite: x   Leuk Esterase: x / RBC: x / WBC x   Sq Epi: x / Non Sq Epi: x / Bacteria: x      CAPILLARY BLOOD GLUCOSE      POCT Blood Glucose.: 137 mg/dL (02 May 2024 07:56)        RADIOLOGY & ADDITIONAL TESTS: Reviewed. HOSPITALIST PROGRESS NOTE    74 yo female with PMHx of HTN, DM2, AS, CAD, PVD, HFpEF (55%) Afib on Coumadin s/p ablation 2023 presents to the ED BIBEMS for progressive shortness of breath and JETT admitted for acute hypoxemic respiratory failure due to influenza and acute exacerbation of CHF requiring Bipap. Stabilized off bipap on 02 , transferred to [facility]     5/2 SUBJECTIVE / INTERVAL HPI: Patient seen and examined at bedside. Still SOB, symptoms slightly better. HR still elevated. Inhalers switched to Xopenex due to tachycardia.     ROS: All 10 systems reviewed and found to be negative with the exception of what has been described above.     VITAL SIGNS:  Vital Signs Last 24 Hrs  T(C): 37.2 (02 May 2024 03:15), Max: 37.2 (02 May 2024 03:15)  T(F): 98.9 (02 May 2024 03:15), Max: 98.9 (02 May 2024 03:15)  HR: 131 (02 May 2024 03:15) (47 - 131)  BP: 110/84 (02 May 2024 03:15) (103/68 - 110/84)  BP(mean): --  RR: 20 (02 May 2024 03:15) (18 - 20)  SpO2: 96% (02 May 2024 03:15) (95% - 99%)    Parameters below as of 02 May 2024 03:15  Patient On (Oxygen Delivery Method): nasal cannula  O2 Flow (L/min): 3    PHYSICAL EXAM:  General: comfortable, no acute distress  HEENT: Atraumatic, no LAD, trachea midline, PERRLA  Cardiovascular: normal s1s2, no murmurs, gallops or friction rubs  Pulmonary: + congestion / + wheezing  Gastrointestinal: soft non tender non distended, no masses felt, no organomegaly  Muscloskeletal: no lower extremity edema, intact bilateral lower extremity pulses  Neurological: CN II-12 intact. No focal weakness  Psychiatrical: normal mood, cooperative  SKIN: no rash, lesions or ulcers    MEDICATIONS:  MEDICATIONS  (STANDING):  acetylcysteine 20%  Inhalation 4 milliLiter(s) Inhalation every 12 hours  albuterol    0.083% 2.5 milliGRAM(s) Nebulizer every 6 hours  albuterol    90 MICROgram(s) HFA Inhaler 1 Puff(s) Inhalation every 4 hours  atorvastatin 10 milliGRAM(s) Oral at bedtime  chlorhexidine 4% Liquid 1 Application(s) Topical daily  dextrose 10% Bolus 125 milliLiter(s) IV Bolus once  dextrose 5%. 1000 milliLiter(s) (50 mL/Hr) IV Continuous <Continuous>  dextrose 50% Injectable 25 Gram(s) IV Push once  enoxaparin Injectable 120 milliGRAM(s) SubCutaneous every 12 hours  furosemide   Injectable 40 milliGRAM(s) IV Push daily  gabapentin 800 milliGRAM(s) Oral three times a day  glucagon  Injectable 1 milliGRAM(s) IntraMuscular once  guaiFENesin  milliGRAM(s) Oral every 12 hours  influenza  Vaccine (HIGH DOSE) 0.7 milliLiter(s) IntraMuscular once  insulin lispro (ADMELOG) corrective regimen sliding scale   SubCutaneous Before meals and at bedtime  methylPREDNISolone sodium succinate Injectable 40 milliGRAM(s) IV Push every 8 hours  metoprolol succinate ER 50 milliGRAM(s) Oral every 12 hours  montelukast 10 milliGRAM(s) Oral at bedtime  oseltamivir 75 milliGRAM(s) Oral two times a day  potassium chloride    Tablet ER 40 milliEquivalent(s) Oral daily  warfarin 5 milliGRAM(s) Oral daily    MEDICATIONS  (PRN):  benzonatate 100 milliGRAM(s) Oral every 8 hours PRN Cough  dextrose Oral Gel 15 Gram(s) Oral once PRN Blood Glucose LESS THAN 70 milliGRAM(s)/deciliter      ALLERGIES:  Allergies    penicillin (Hives; Urticaria)  PC Pen VK (Rash)  latex (Unknown)  [This allergen will not trigger allergy alert] IV DYE, IODINE CONTRAST (Unknown)    Intolerances        LABS:                        12.4   5.74  )-----------( 225      ( 02 May 2024 07:30 )             39.2     05-02    135  |  100  |  18  ----------------------------<  135<H>  3.6   |  30  |  0.63    Ca    8.5      02 May 2024 07:30  Phos  3.4     05-01  Mg     1.9     05-01      PT/INR - ( 02 May 2024 07:30 )   PT: 19.1 sec;   INR: 1.72 ratio         PTT - ( 02 May 2024 07:30 )  PTT:47.0 sec  Urinalysis Basic - ( 02 May 2024 07:30 )    Color: x / Appearance: x / SG: x / pH: x  Gluc: 135 mg/dL / Ketone: x  / Bili: x / Urobili: x   Blood: x / Protein: x / Nitrite: x   Leuk Esterase: x / RBC: x / WBC x   Sq Epi: x / Non Sq Epi: x / Bacteria: x      CAPILLARY BLOOD GLUCOSE      POCT Blood Glucose.: 137 mg/dL (02 May 2024 07:56)        RADIOLOGY & ADDITIONAL TESTS: Reviewed.

## 2024-05-02 NOTE — CHART NOTE - NSCHARTNOTEFT_GEN_A_CORE
MEDICINE NP    Notified by RN patient with temperature 102.3 . Seen and examined patient at bedside. Patient is alert, NAD. Denies HA, CP, cough, N/V, or abd pain. Fever treated with IV tylenol,     VITAL SIGNS:  T(C): 39.1 (05-01-24 @ 02:56), Max: 39.1 (05-01-24 @ 02:56)  HR: 152 (05-01-24 @ 02:56) (95 - 152)  BP: 96/70 (05-01-24 @ 02:56) (84/72 - 149/108)  RR: 18 (05-01-24 @ 02:56) (18 - 27)  SpO2: 96% (05-01-24 @ 02:56) (96% - 100%)  Wt(kg): --      LABORATORY:                          12.0   6.24  )-----------( 267      ( 30 Apr 2024 05:44 )             37.7       04-30    135  |  102  |  13  ----------------------------<  107<H>  3.8   |  27  |  0.72    Ca    8.8      30 Apr 2024 05:44  Phos  4.1     04-30  Mg     2.0     04-30    TPro  6.8  /  Alb  2.8<L>  /  TBili  0.5  /  DBili  x   /  AST  37  /  ALT  45  /  AlkPhos  82  04-30          MICROBIOLOGY:           RADIOLOGY:          PHYSICAL EXAM:    Constitutional: AOx3. NAD.    Respiratory: clear lungs bilaterally. No wheezing, rhonchi, or crackles.    Cardiovascular: S1 S2. No murmurs.    Gastrointestinal: BS X4 active. soft. nontender.    Extremities/Vascular: +2 pulses bilaterally. No BLE edema.      ASSESSMENT/PLAN:   HPI:  72 y/o F with PMHx of HTN, DM2, AS, CAD, PVD, HFpEF (55%) Afib on Coumadin s/p ablation presents to the ED BIBEMS for progressive shortness of breath and JETT. Pt states that she just came back from a cruise yesterday and that over the past 2 days she has felt increasingly short of breath. She notes she had a fever of 101 at home and has noticed worsening LE edema. Pt denies CP, abdominal pain but does admit to nausea and diarrhea last night. She notes that for the past 2 days she has had very little PO intake since feeling ill. Pt's  who she came in with is also sick. In the ED pt was found to be Flu A  positive and with increased WOB, BNP of 1800 was placed on Bipap and found to be in Afib w RVR. ICU consulted for the above. now with fever        1)Fever  -tylenol and cooling measures   -lactate  -EKG  -CXR  -Labs  -chest PT  -F/U primary team in AM
Pt Hr greater than 120 bpm. requested MD to order Levalbuterol due to elevated HR. Nebs albuterol and duoneb on hold due to HR. MD and RN advised.

## 2024-05-02 NOTE — PROGRESS NOTE ADULT - ASSESSMENT
Acute hypoxic resp failure - multifactorial   Decompensated HFpEF suspect due to noncompliance with diet  Viral URI 2/2 + Influenza A   A.fib with RVR due to the above  Hypothyroidism    PLAN:    pulmonary better  Taper IV solumedrol 40mg q12  Neb with mucomyst  Tamiflu x 5 days  Trend WBC  Monitor temps. f/u blood and urine cultures.   Diurese  Echo  Afib with RVR Lovenox 120mg bid to bridge warfarin 5mg qd.  DVT PROPHYLASIX

## 2024-05-02 NOTE — CONSULT NOTE ADULT - SUBJECTIVE AND OBJECTIVE BOX
HPI: Pt is a 73y old Female with hx of HTN, DM2, AS, CAD, PVD, HFpEF (55%) Afib on Coumadin s/p ablation  presents to the ED BIBEMS for progressive shortness of breath and increased work of breathing admitted for acute hypoxemic respiratory failure due to influenza and acute exacerbation of CHF requiring Bipap. Palliative medicine saw patient to address GOC and advance care planing     2024 pt seen and examined with no family at bedside, patient states that she thinks her breathing has improved slightly and that she is starting to feel a little bit better then when she originally came in to the hospital. GOC meeting held today with patient please see note.       PAIN: ( )Yes   (x )No  DYSPNEA: (x ) Yes  ( ) No  Level: moderate at times worse with exertion     PAST MEDICAL & SURGICAL HISTORY:  Diabetes mellitus type II, controlled  Atrial fibrillation  Congestive heart failure  Dyspnea  Morbid obesity with BMI of 40.0-44.9, adult  Neuropathy  Peripheral venous insufficiency  Thyroid nodule  HTN (hypertension)  Cellulitis  At risk for sleep apnea  History of esophagogastroduodenoscopy (EGD)  H/O colonoscopy  Other complications of gastric band procedure  S/P left knee arthroscopy  Status post medial meniscus repair  History of cholecystectomy  S/P cataract surgery    SOCIAL HX:     Hx opiate tolerance ( )YES  (x )NO    Baseline ADLs  (Prior to Admission)  (x ) Independent   ( )Dependent    FAMILY HISTORY:  Family history of breast cancer in mother    Family history of diabetes mellitus in mother    FHx: heart disease (Sibling)    Review of Systems:    Anxiety- yes   Dyspnea- yes  Cough- yes  Secretions- yes  Fatigue- yes  Weakness- yes      All other systems reviewed and negative    PHYSICAL EXAM:    Vital Signs Last 24 Hrs  T(C): 36.3 (02 May 2024 09:19), Max: 37.2 (02 May 2024 03:15)  T(F): 97.3 (02 May 2024 09:19), Max: 98.9 (02 May 2024 03:15)  HR: 56 (02 May 2024 09:19) (47 - 131)  BP: 153/62 (02 May 2024 09:19) (103/68 - 153/62)  BP(mean): 83 (02 May 2024 09:19) (83 - 83)  RR: 18 (02 May 2024 09:19) (18 - 20)  SpO2: 96% (02 May 2024 09:19) (95% - 99%)    Parameters below as of 02 May 2024 09:19  Patient On (Oxygen Delivery Method): nasal cannula  O2 Flow (L/min): 3    Daily Weight in k.3 (02 May 2024 06:00)    PPSV2: 60  %    General: pleasant female in bed, NAD  Mental Status: alert and oriented   HEENT: nasal cannula in place   Lungs: diminished breath sounds b/l   Cardiac: s1s2 +   GI: nontender, nondistended, +BS   : +voiding   Ext: edema +, discoloration +CAPPS   Neuro: weakness, non focal       LABS:                        12.4   5.74  )-----------( 225      ( 02 May 2024 07:30 )             39.2     05-02    135  |  100  |  18  ----------------------------<  135<H>  3.6   |  30  |  0.63    Ca    8.5      02 May 2024 07:30  Phos  3.4     05-01  Mg     1.9     05-01      PT/INR - ( 02 May 2024 07:30 )   PT: 19.1 sec;   INR: 1.72 ratio         PTT - ( 02 May 2024 07:30 )  PTT:47.0 sec  Albumin: Albumin: 2.8 g/dL ( @ 05:44)      Allergies    penicillin (Hives; Urticaria)  PC Pen VK (Rash)  latex (Unknown)  [This allergen will not trigger allergy alert] IV DYE, IODINE CONTRAST (Unknown)    Intolerances      MEDICATIONS  (STANDING):  acetylcysteine 20%  Inhalation 4 milliLiter(s) Inhalation every 12 hours  albuterol    0.083% 2.5 milliGRAM(s) Nebulizer every 6 hours  albuterol    90 MICROgram(s) HFA Inhaler 1 Puff(s) Inhalation every 4 hours  atorvastatin 10 milliGRAM(s) Oral at bedtime  chlorhexidine 4% Liquid 1 Application(s) Topical daily  dextrose 10% Bolus 125 milliLiter(s) IV Bolus once  dextrose 5%. 1000 milliLiter(s) (50 mL/Hr) IV Continuous <Continuous>  dextrose 50% Injectable 25 Gram(s) IV Push once  enoxaparin Injectable 120 milliGRAM(s) SubCutaneous every 12 hours  furosemide   Injectable 40 milliGRAM(s) IV Push daily  gabapentin 800 milliGRAM(s) Oral three times a day  glucagon  Injectable 1 milliGRAM(s) IntraMuscular once  guaiFENesin  milliGRAM(s) Oral every 12 hours  influenza  Vaccine (HIGH DOSE) 0.7 milliLiter(s) IntraMuscular once  insulin lispro (ADMELOG) corrective regimen sliding scale   SubCutaneous Before meals and at bedtime  methylPREDNISolone sodium succinate Injectable 40 milliGRAM(s) IV Push every 8 hours  metoprolol succinate ER 50 milliGRAM(s) Oral every 12 hours  montelukast 10 milliGRAM(s) Oral at bedtime  oseltamivir 75 milliGRAM(s) Oral two times a day  potassium chloride    Tablet ER 40 milliEquivalent(s) Oral daily  warfarin 5 milliGRAM(s) Oral daily    MEDICATIONS  (PRN):  benzonatate 100 milliGRAM(s) Oral every 8 hours PRN Cough  dextrose Oral Gel 15 Gram(s) Oral once PRN Blood Glucose LESS THAN 70 milliGRAM(s)/deciliter      RADIOLOGY/ADDITIONAL STUDIES:    ACC: [id number] EXAM:  XR CHEST PORTABLE IMMED 1V   ORDERED BY: VINCE KINNEY   PROCEDURE DATE:  2024    INTERPRETATION:  Portable AP chest radiograph  COMPARISON: 2023 chest x-ray.  CLINICAL INFORMATION: Sepsis 2023 chest x-ray.  FINDINGS:  CATHETERS AND TUBES: None  PULMONARY: Patient's mandible obscures LEFT apex.  There are no airspace consolidations or radiographic evidence of   pulmonary nodules..  No pleural effusion or pneumothorax.  HEART/VASCULAR: The heart size and mediastinum configuration are within   the limits of normal.  BONES: The visualized osseous thorax is intact.    IMPRESSION:  No radiographic evidence of active chest disease..  Patient's mandible obscures LEFT apex.

## 2024-05-02 NOTE — PROGRESS NOTE ADULT - ASSESSMENT
73 year old woman with the above PMH admitted with shortness of breath.  Work up so far reveals acute on chronic HFpEF, recurrent atrial fibrillation with a rapid VR, - ACS, and + influenza A on oseltsmsavir.  TTE confirms HFpEF but also moderate to severe aortic stenosis.  Will continue IV furosemide and metoprolol.  Her INR is subtherapeutic and she is currently on enoxaparin.  The patient will ultimately require a repeat ablation and a possible TAVR    5/2/24-Afib with increased VR-will change metoprolol to bid.  Kcl low-will give it daily

## 2024-05-02 NOTE — PROGRESS NOTE ADULT - SUBJECTIVE AND OBJECTIVE BOX
CHIEF COMPLAINT: Patient is a 73y old  Female who presents with a chief complaint of CHF exacerbation , Flu (30 Apr 2024 15:44)      HPI: HPI:  74 y/o F with PMHx of HTN, DM2, AS, CAD, PVD, HFpEF (55%) Afib on Coumadin s/p ablation presents to the ED BIBEMS for progressive shortness of breath and JETT. Pt states that she just came back from a cruise yesterday and that over the past 2 days she has felt increasingly short of breath. She notes she had a fever of 101 at home and has noticed worsening LE edema. Pt denies CP, abdominal pain but does admit to nausea and diarrhea last night. She notes that for the past 2 days she has had very little PO intake since feeling ill. Pt's  who she came in with is also sick. In the ED pt was found to be Flu A  positive and with increased WOB, BNP of 1800 was placed on Bipap and found to be in Afib w RVR. ICU consulted for the above.  (29 Apr 2024 23:00)      PMHx: PAST MEDICAL & SURGICAL HISTORY:  Diabetes mellitus type II, controlled      Atrial fibrillation      Congestive heart failure      Dyspnea      Morbid obesity with BMI of 40.0-44.9, adult      Neuropathy      Peripheral venous insufficiency      Thyroid nodule      HTN (hypertension)      Cellulitis      At risk for sleep apnea      History of esophagogastroduodenoscopy (EGD)      H/O colonoscopy      Other complications of gastric band procedure      S/P left knee arthroscopy      Status post medial meniscus repair      History of cholecystectomy      S/P cataract surgery            Soc Hx:     FAMILY HISTORY:  Family history of breast cancer in mother    Family history of diabetes mellitus in mother    FHx: heart disease (Sibling)        Allergies: Allergies    penicillin (Hives; Urticaria)  PC Pen VK (Rash)  latex (Unknown)  [This allergen will not trigger allergy alert] IV DYE, IODINE CONTRAST (Unknown)    Intolerances          REVIEW OF SYSTEMS:    As above  No chest pain + shortness of breath  No lightheadeness or syncope  No leg swelling  No palpitations  No claudication-like symptoms    ICU Vital Signs Last 24 Hrs  T(C): 37.2 (02 May 2024 03:15), Max: 37.2 (02 May 2024 03:15)  T(F): 98.9 (02 May 2024 03:15), Max: 98.9 (02 May 2024 03:15)  HR: 131 (02 May 2024 03:15) (47 - 131)  BP: 110/84 (02 May 2024 03:15) (103/68 - 110/84)  BP(mean): --  ABP: --  ABP(mean): --  RR: 20 (02 May 2024 03:15) (18 - 20)  SpO2: 96% (02 May 2024 03:15) (95% - 99%)    O2 Parameters below as of 02 May 2024 03:15  Patient On (Oxygen Delivery Method): nasal cannula  O2 Flow (L/min): 3        PHYSICAL EXAM:   Patient in NAD  Neck: No JVD; Carotids:  2+ without bruits  Respiratory:  Clear to A&P  Cardiovascular: S1 and S2, iregular rate and rhythm, no Murmurs, gallops or rubs        MEDICATIONS  (STANDING):  acetylcysteine 20%  Inhalation 4 milliLiter(s) Inhalation every 12 hours  albuterol    0.083% 2.5 milliGRAM(s) Nebulizer every 6 hours  albuterol    90 MICROgram(s) HFA Inhaler 1 Puff(s) Inhalation every 4 hours  atorvastatin 10 milliGRAM(s) Oral at bedtime  chlorhexidine 4% Liquid 1 Application(s) Topical daily  dextrose 10% Bolus 125 milliLiter(s) IV Bolus once  dextrose 5%. 1000 milliLiter(s) (50 mL/Hr) IV Continuous <Continuous>  dextrose 50% Injectable 25 Gram(s) IV Push once  enoxaparin Injectable 120 milliGRAM(s) SubCutaneous every 12 hours  furosemide   Injectable 40 milliGRAM(s) IV Push daily  gabapentin 800 milliGRAM(s) Oral three times a day  glucagon  Injectable 1 milliGRAM(s) IntraMuscular once  guaiFENesin  milliGRAM(s) Oral every 12 hours  influenza  Vaccine (HIGH DOSE) 0.7 milliLiter(s) IntraMuscular once  insulin lispro (ADMELOG) corrective regimen sliding scale   SubCutaneous Before meals and at bedtime  methylPREDNISolone sodium succinate Injectable 40 milliGRAM(s) IV Push every 8 hours  metoprolol succinate ER 50 milliGRAM(s) Oral daily  montelukast 10 milliGRAM(s) Oral at bedtime  oseltamivir 75 milliGRAM(s) Oral two times a day  warfarin 5 milliGRAM(s) Oral daily      Home Medications:  Acidophilus oral tablet: 1 tab(s) orally once a day (29 Apr 2024 22:44)  cholestyramine 4 g/5 g oral powder for reconstitution: 1 packet(s) orally once a day (in the morning) (29 Apr 2024 22:42)  ezetimibe 10 mg oral tablet: 1 tab(s) orally once a day (in the evening) (29 Apr 2024 22:43)  gabapentin 800 mg oral tablet: 1 tab(s) orally 3 times a day (29 Apr 2024 22:44)  losartan 25 mg oral tablet: 1 tab(s) orally once a day (29 Apr 2024 22:43)  metFORMIN 1000 mg oral tablet: 1 tab(s) orally 2 times a day (29 Apr 2024 22:43)  metoprolol succinate 50 mg oral tablet, extended release: 1 tab(s) orally once a day (29 Apr 2024 22:43)  montelukast 10 mg oral tablet: 1 tab(s) orally once a day (at bedtime) (29 Apr 2024 22:43)  pravastatin 10 mg oral tablet: 1 tab(s) orally once a day (in the evening) (29 Apr 2024 22:43)  predniSONE 5 mg oral tablet: 1 tab(s) orally once a day (29 Apr 2024 22:43)  Vitamin D3 25 mcg (1000 intl units) oral capsule: 1 cap(s) orally once a day (29 Apr 2024 22:44)  warfarin 5 mg oral tablet: 1 tab(s) orally once a day (at bedtime) (29 Apr 2024 22:44)  zolpidem 10 mg oral tablet: 1 tab(s) orally once a day (at bedtime) (29 Apr 2024 22:43)        LABS: All Labs Reviewed:  Blood Culture: Organism --  Gram Stain Blood -- Gram Stain --  Specimen Source Clean Catch None  Culture-Blood --    Organism --  Gram Stain Blood -- Gram Stain --  Specimen Source .Blood None  Culture-Blood --      BNP Pro-Brain Natriuretic Peptide (04.29.24 @ 19:22) Pro-Brain Natriuretic Peptide: 1882 pg/mL                        11.5   4.40  )-----------( 232      ( 01 May 2024 08:53 )             37.0                               Cardiac markers   Troponin I, High Sensitivity (04.29.24 @ 19:22) Troponin I, High Sensitivity Result: 16.14                                       Chems        Sodium: 134 mmol/L (05-01 @ 03:33)  Sodium: 135 mmol/L (04-30 @ 05:44)  Sodium: 137 mmol/L (04-29 @ 19:22)          Potassium: 3.7 mmol/L (05-01 @ 03:33)  Potassium: 3.8 mmol/L (04-30 @ 05:44)  Potassium: 3.7 mmol/L (04-29 @ 19:22)          Blood Urea Nitrogen: 16 mg/dL (05-01 @ 03:33)  Blood Urea Nitrogen: 13 mg/dL (04-30 @ 05:44)  Blood Urea Nitrogen: 14 mg/dL (04-29 @ 19:22)          Creatinine 0.73  Creatinine 0.72  Creatinine 0.70          Magnesium: 1.8 mg/dL (05-01 @ 03:33)  Magnesium: 2.0 mg/dL (04-30 @ 05:44)  Magnesium: 1.4 mg/dL (04-29 @ 23:47)          Protein Total: 6.8 gm/dL (04-30 @ 05:44)  Protein Total: 7.3 gm/dL (04-29 @ 19:22)                  Calcium: 8.4 mg/dL (05-01 @ 03:33)  Calcium: 8.8 mg/dL (04-30 @ 05:44)  Calcium: 8.9 mg/dL (04-29 @ 19:22)          Phosphorus: 3.3 mg/dL (05-01 @ 03:33)  Phosphorus: 4.1 mg/dL (04-30 @ 05:44)  Phosphorus: 3.9 mg/dL (04-29 @ 23:47)          Bilirubin Total: 0.5 mg/dL (04-30 @ 05:44)  Bilirubin Total: 0.6 mg/dL (04-29 @ 19:22)          Alanine Aminotransferase (ALT/SGPT): 45 U/L (04-30 @ 05:44)  Alanine Aminotransferase (ALT/SGPT): 44 U/L (04-29 @ 19:22)          Aspartate Aminotransferase (AST/SGOT): 37 U/L (04-30 @ 05:44)  Aspartate Aminotransferase (AST/SGOT): 33 U/L (04-29 @ 19:22)    05-01    135  |  102  |  15  ----------------------------<  104<H>  3.3<L>   |  28  |  0.64    Ca    8.6      01 May 2024 08:53  Phos  3.4     05-01  Mg     1.9     05-01                  INR - ( 01 May 2024 08:53 ) 1.62 ratio     INR: 1.36 ratio (04-30 @ 05:44)  INR: 1.50 ratio (04-29 @ 19:22)             RADIOLOGY:    EKG:  Atrial fibrillation with a rapid VR    Telemetry:  Atrial fibrillation/mildly rapid VR    ECHO: < from: TTE W or WO Ultrasound Enhancing Agent (04.30.24 @ 07:15) >  CONCLUSIONS:      1. Left ventricular cavity is normal in size. Left ventricular wall thickness is normal. Left ventricular systolic function is normal with an ejection fraction visually estimated at 55 to 60 %.   2. The left atrium is moderately dilated.   3. The right atrium is dilated.   4. Mild mitral regurgitation.   5. Mild to moderate tricuspid regurgitation.   6. Mild pulmonary hypertension.   7. Moderate to severe aortic stenosis.   8. There is moderate calcification of the mitral valve annulus.    < end of copied text >         CHIEF COMPLAINT: Patient is a 73y old  Female who presents with a chief complaint of CHF exacerbation , Flu (30 Apr 2024 15:44)      HPI: HPI:  72 y/o F with PMHx of HTN, DM2, AS, CAD, PVD, HFpEF (55%) Afib on Coumadin s/p ablation presents to the ED BIBEMS for progressive shortness of breath and JETT. Pt states that she just came back from a cruise yesterday and that over the past 2 days she has felt increasingly short of breath. She notes she had a fever of 101 at home and has noticed worsening LE edema. Pt denies CP, abdominal pain but does admit to nausea and diarrhea last night. She notes that for the past 2 days she has had very little PO intake since feeling ill. Pt's  who she came in with is also sick. In the ED pt was found to be Flu A  positive and with increased WOB, BNP of 1800 was placed on Bipap and found to be in Afib w RVR. ICU consulted for the above.  (29 Apr 2024 23:00)      PMHx: PAST MEDICAL & SURGICAL HISTORY:  Diabetes mellitus type II, controlled      Atrial fibrillation      Congestive heart failure      Dyspnea      Morbid obesity with BMI of 40.0-44.9, adult      Neuropathy      Peripheral venous insufficiency      Thyroid nodule      HTN (hypertension)      Cellulitis      At risk for sleep apnea      History of esophagogastroduodenoscopy (EGD)      H/O colonoscopy      Other complications of gastric band procedure      S/P left knee arthroscopy      Status post medial meniscus repair      History of cholecystectomy      S/P cataract surgery            Soc Hx:     FAMILY HISTORY:  Family history of breast cancer in mother    Family history of diabetes mellitus in mother    FHx: heart disease (Sibling)        Allergies: Allergies    penicillin (Hives; Urticaria)  PC Pen VK (Rash)  latex (Unknown)  [This allergen will not trigger allergy alert] IV DYE, IODINE CONTRAST (Unknown)    Intolerances          REVIEW OF SYSTEMS:    As above  No chest pain + shortness of breath  No lightheadeness or syncope  No leg swelling  No palpitations  No claudication-like symptoms    ICU Vital Signs Last 24 Hrs  T(C): 37.2 (02 May 2024 03:15), Max: 37.2 (02 May 2024 03:15)  T(F): 98.9 (02 May 2024 03:15), Max: 98.9 (02 May 2024 03:15)  HR: 131 (02 May 2024 03:15) (47 - 131)  BP: 110/84 (02 May 2024 03:15) (103/68 - 110/84)  BP(mean): --  ABP: --  ABP(mean): --  RR: 20 (02 May 2024 03:15) (18 - 20)  SpO2: 96% (02 May 2024 03:15) (95% - 99%)    O2 Parameters below as of 02 May 2024 03:15  Patient On (Oxygen Delivery Method): nasal cannula  O2 Flow (L/min): 3        PHYSICAL EXAM:   Patient in NAD  Neck: No JVD; Carotids:  2+ without bruits  Respiratory:  Diffuse rhonchi  Cardiovascular: S1 and S2, iregular rate and rhythm + syst m        MEDICATIONS  (STANDING):  acetylcysteine 20%  Inhalation 4 milliLiter(s) Inhalation every 12 hours  albuterol    0.083% 2.5 milliGRAM(s) Nebulizer every 6 hours  albuterol    90 MICROgram(s) HFA Inhaler 1 Puff(s) Inhalation every 4 hours  atorvastatin 10 milliGRAM(s) Oral at bedtime  chlorhexidine 4% Liquid 1 Application(s) Topical daily  dextrose 10% Bolus 125 milliLiter(s) IV Bolus once  dextrose 5%. 1000 milliLiter(s) (50 mL/Hr) IV Continuous <Continuous>  dextrose 50% Injectable 25 Gram(s) IV Push once  enoxaparin Injectable 120 milliGRAM(s) SubCutaneous every 12 hours  furosemide   Injectable 40 milliGRAM(s) IV Push daily  gabapentin 800 milliGRAM(s) Oral three times a day  glucagon  Injectable 1 milliGRAM(s) IntraMuscular once  guaiFENesin  milliGRAM(s) Oral every 12 hours  influenza  Vaccine (HIGH DOSE) 0.7 milliLiter(s) IntraMuscular once  insulin lispro (ADMELOG) corrective regimen sliding scale   SubCutaneous Before meals and at bedtime  methylPREDNISolone sodium succinate Injectable 40 milliGRAM(s) IV Push every 8 hours  metoprolol succinate ER 50 milliGRAM(s) Oral daily  montelukast 10 milliGRAM(s) Oral at bedtime  oseltamivir 75 milliGRAM(s) Oral two times a day  warfarin 5 milliGRAM(s) Oral daily      Home Medications:  Acidophilus oral tablet: 1 tab(s) orally once a day (29 Apr 2024 22:44)  cholestyramine 4 g/5 g oral powder for reconstitution: 1 packet(s) orally once a day (in the morning) (29 Apr 2024 22:42)  ezetimibe 10 mg oral tablet: 1 tab(s) orally once a day (in the evening) (29 Apr 2024 22:43)  gabapentin 800 mg oral tablet: 1 tab(s) orally 3 times a day (29 Apr 2024 22:44)  losartan 25 mg oral tablet: 1 tab(s) orally once a day (29 Apr 2024 22:43)  metFORMIN 1000 mg oral tablet: 1 tab(s) orally 2 times a day (29 Apr 2024 22:43)  metoprolol succinate 50 mg oral tablet, extended release: 1 tab(s) orally once a day (29 Apr 2024 22:43)  montelukast 10 mg oral tablet: 1 tab(s) orally once a day (at bedtime) (29 Apr 2024 22:43)  pravastatin 10 mg oral tablet: 1 tab(s) orally once a day (in the evening) (29 Apr 2024 22:43)  predniSONE 5 mg oral tablet: 1 tab(s) orally once a day (29 Apr 2024 22:43)  Vitamin D3 25 mcg (1000 intl units) oral capsule: 1 cap(s) orally once a day (29 Apr 2024 22:44)  warfarin 5 mg oral tablet: 1 tab(s) orally once a day (at bedtime) (29 Apr 2024 22:44)  zolpidem 10 mg oral tablet: 1 tab(s) orally once a day (at bedtime) (29 Apr 2024 22:43)        LABS: All Labs Reviewed:  Blood Culture: Organism --  Gram Stain Blood -- Gram Stain --  Specimen Source Clean Catch None  Culture-Blood --    Organism --  Gram Stain Blood -- Gram Stain --  Specimen Source .Blood None  Culture-Blood --      BNP Pro-Brain Natriuretic Peptide (04.29.24 @ 19:22) Pro-Brain Natriuretic Peptide: 1882 pg/mL                        11.5   4.40  )-----------( 232      ( 01 May 2024 08:53 )             37.0                               Cardiac markers   Troponin I, High Sensitivity (04.29.24 @ 19:22) Troponin I, High Sensitivity Result: 16.14                                       Chems        Sodium: 134 mmol/L (05-01 @ 03:33)  Sodium: 135 mmol/L (04-30 @ 05:44)  Sodium: 137 mmol/L (04-29 @ 19:22)          Potassium: 3.7 mmol/L (05-01 @ 03:33)  Potassium: 3.8 mmol/L (04-30 @ 05:44)  Potassium: 3.7 mmol/L (04-29 @ 19:22)          Blood Urea Nitrogen: 16 mg/dL (05-01 @ 03:33)  Blood Urea Nitrogen: 13 mg/dL (04-30 @ 05:44)  Blood Urea Nitrogen: 14 mg/dL (04-29 @ 19:22)          Creatinine 0.73  Creatinine 0.72  Creatinine 0.70          Magnesium: 1.8 mg/dL (05-01 @ 03:33)  Magnesium: 2.0 mg/dL (04-30 @ 05:44)  Magnesium: 1.4 mg/dL (04-29 @ 23:47)          Protein Total: 6.8 gm/dL (04-30 @ 05:44)  Protein Total: 7.3 gm/dL (04-29 @ 19:22)                  Calcium: 8.4 mg/dL (05-01 @ 03:33)  Calcium: 8.8 mg/dL (04-30 @ 05:44)  Calcium: 8.9 mg/dL (04-29 @ 19:22)          Phosphorus: 3.3 mg/dL (05-01 @ 03:33)  Phosphorus: 4.1 mg/dL (04-30 @ 05:44)  Phosphorus: 3.9 mg/dL (04-29 @ 23:47)          Bilirubin Total: 0.5 mg/dL (04-30 @ 05:44)  Bilirubin Total: 0.6 mg/dL (04-29 @ 19:22)          Alanine Aminotransferase (ALT/SGPT): 45 U/L (04-30 @ 05:44)  Alanine Aminotransferase (ALT/SGPT): 44 U/L (04-29 @ 19:22)          Aspartate Aminotransferase (AST/SGOT): 37 U/L (04-30 @ 05:44)  Aspartate Aminotransferase (AST/SGOT): 33 U/L (04-29 @ 19:22)    05-01    135  |  102  |  15  ----------------------------<  104<H>  3.3<L>   |  28  |  0.64    Ca    8.6      01 May 2024 08:53  Phos  3.4     05-01  Mg     1.9     05-01                  INR - ( 01 May 2024 08:53 ) 1.62 ratio     INR: 1.36 ratio (04-30 @ 05:44)  INR: 1.50 ratio (04-29 @ 19:22)             RADIOLOGY:    EKG:  Atrial fibrillation with a rapid VR    Telemetry:  Atrial fibrillation/mildly rapid VR    ECHO: < from: TTE W or WO Ultrasound Enhancing Agent (04.30.24 @ 07:15) >  CONCLUSIONS:      1. Left ventricular cavity is normal in size. Left ventricular wall thickness is normal. Left ventricular systolic function is normal with an ejection fraction visually estimated at 55 to 60 %.   2. The left atrium is moderately dilated.   3. The right atrium is dilated.   4. Mild mitral regurgitation.   5. Mild to moderate tricuspid regurgitation.   6. Mild pulmonary hypertension.   7. Moderate to severe aortic stenosis.   8. There is moderate calcification of the mitral valve annulus.    < end of copied text >         CHIEF COMPLAINT: Patient is a 73y old  Female who presents with a chief complaint of CHF exacerbation , Flu (30 Apr 2024 15:44)      HPI: HPI:  74 y/o F with PMHx of HTN, DM2, AS, CAD, PVD, HFpEF (55%) Afib on Coumadin s/p ablation presents to the ED BIBEMS for progressive shortness of breath and JETT. Pt states that she just came back from a cruise yesterday and that over the past 2 days she has felt increasingly short of breath. She notes she had a fever of 101 at home and has noticed worsening LE edema. Pt denies CP, abdominal pain but does admit to nausea and diarrhea last night. She notes that for the past 2 days she has had very little PO intake since feeling ill. Pt's  who she came in with is also sick. In the ED pt was found to be Flu A  positive and with increased WOB, BNP of 1800 was placed on Bipap and found to be in Afib w RVR. ICU consulted for the above.  (29 Apr 2024 23:00)      PMHx: PAST MEDICAL & SURGICAL HISTORY:  Diabetes mellitus type II, controlled      Atrial fibrillation      Congestive heart failure      Dyspnea      Morbid obesity with BMI of 40.0-44.9, adult      Neuropathy      Peripheral venous insufficiency      Thyroid nodule      HTN (hypertension)      Cellulitis      At risk for sleep apnea      History of esophagogastroduodenoscopy (EGD)      H/O colonoscopy      Other complications of gastric band procedure      S/P left knee arthroscopy      Status post medial meniscus repair      History of cholecystectomy      S/P cataract surgery            Soc Hx:     FAMILY HISTORY:  Family history of breast cancer in mother    Family history of diabetes mellitus in mother    FHx: heart disease (Sibling)        Allergies: Allergies    penicillin (Hives; Urticaria)  PC Pen VK (Rash)  latex (Unknown)  [This allergen will not trigger allergy alert] IV DYE, IODINE CONTRAST (Unknown)    Intolerances          REVIEW OF SYSTEMS:    As above  No chest pain + shortness of breath  No lightheadeness or syncope  No leg swelling  No palpitations  No claudication-like symptoms    ICU Vital Signs Last 24 Hrs  T(C): 37.2 (02 May 2024 03:15), Max: 37.2 (02 May 2024 03:15)  T(F): 98.9 (02 May 2024 03:15), Max: 98.9 (02 May 2024 03:15)  HR: 131 (02 May 2024 03:15) (47 - 131)  BP: 110/84 (02 May 2024 03:15) (103/68 - 110/84)  BP(mean): --  ABP: --  ABP(mean): --  RR: 20 (02 May 2024 03:15) (18 - 20)  SpO2: 96% (02 May 2024 03:15) (95% - 99%)    O2 Parameters below as of 02 May 2024 03:15  Patient On (Oxygen Delivery Method): nasal cannula  O2 Flow (L/min): 3        PHYSICAL EXAM:   Patient in mild distress  Neck: No JVD; Carotids:  2+ without bruits  Respiratory:  Diffuse rhonchi  Cardiovascular: S1 and S2, iregular rate and rhythm + syst m        MEDICATIONS  (STANDING):  acetylcysteine 20%  Inhalation 4 milliLiter(s) Inhalation every 12 hours  albuterol    0.083% 2.5 milliGRAM(s) Nebulizer every 6 hours  albuterol    90 MICROgram(s) HFA Inhaler 1 Puff(s) Inhalation every 4 hours  atorvastatin 10 milliGRAM(s) Oral at bedtime  chlorhexidine 4% Liquid 1 Application(s) Topical daily  dextrose 10% Bolus 125 milliLiter(s) IV Bolus once  dextrose 5%. 1000 milliLiter(s) (50 mL/Hr) IV Continuous <Continuous>  dextrose 50% Injectable 25 Gram(s) IV Push once  enoxaparin Injectable 120 milliGRAM(s) SubCutaneous every 12 hours  furosemide   Injectable 40 milliGRAM(s) IV Push daily  gabapentin 800 milliGRAM(s) Oral three times a day  glucagon  Injectable 1 milliGRAM(s) IntraMuscular once  guaiFENesin  milliGRAM(s) Oral every 12 hours  influenza  Vaccine (HIGH DOSE) 0.7 milliLiter(s) IntraMuscular once  insulin lispro (ADMELOG) corrective regimen sliding scale   SubCutaneous Before meals and at bedtime  methylPREDNISolone sodium succinate Injectable 40 milliGRAM(s) IV Push every 8 hours  metoprolol succinate ER 50 milliGRAM(s) Oral daily  montelukast 10 milliGRAM(s) Oral at bedtime  oseltamivir 75 milliGRAM(s) Oral two times a day  warfarin 5 milliGRAM(s) Oral daily      Home Medications:  Acidophilus oral tablet: 1 tab(s) orally once a day (29 Apr 2024 22:44)  cholestyramine 4 g/5 g oral powder for reconstitution: 1 packet(s) orally once a day (in the morning) (29 Apr 2024 22:42)  ezetimibe 10 mg oral tablet: 1 tab(s) orally once a day (in the evening) (29 Apr 2024 22:43)  gabapentin 800 mg oral tablet: 1 tab(s) orally 3 times a day (29 Apr 2024 22:44)  losartan 25 mg oral tablet: 1 tab(s) orally once a day (29 Apr 2024 22:43)  metFORMIN 1000 mg oral tablet: 1 tab(s) orally 2 times a day (29 Apr 2024 22:43)  metoprolol succinate 50 mg oral tablet, extended release: 1 tab(s) orally once a day (29 Apr 2024 22:43)  montelukast 10 mg oral tablet: 1 tab(s) orally once a day (at bedtime) (29 Apr 2024 22:43)  pravastatin 10 mg oral tablet: 1 tab(s) orally once a day (in the evening) (29 Apr 2024 22:43)  predniSONE 5 mg oral tablet: 1 tab(s) orally once a day (29 Apr 2024 22:43)  Vitamin D3 25 mcg (1000 intl units) oral capsule: 1 cap(s) orally once a day (29 Apr 2024 22:44)  warfarin 5 mg oral tablet: 1 tab(s) orally once a day (at bedtime) (29 Apr 2024 22:44)  zolpidem 10 mg oral tablet: 1 tab(s) orally once a day (at bedtime) (29 Apr 2024 22:43)        LABS: All Labs Reviewed:  Blood Culture: Organism --  Gram Stain Blood -- Gram Stain --  Specimen Source Clean Catch None  Culture-Blood --    Organism --  Gram Stain Blood -- Gram Stain --  Specimen Source .Blood None  Culture-Blood --      BNP Pro-Brain Natriuretic Peptide (04.29.24 @ 19:22) Pro-Brain Natriuretic Peptide: 1882 pg/mL                        11.5   4.40  )-----------( 232      ( 01 May 2024 08:53 )             37.0                               Cardiac markers   Troponin I, High Sensitivity (04.29.24 @ 19:22) Troponin I, High Sensitivity Result: 16.14                                       Chems        Sodium: 134 mmol/L (05-01 @ 03:33)  Sodium: 135 mmol/L (04-30 @ 05:44)  Sodium: 137 mmol/L (04-29 @ 19:22)          Potassium: 3.7 mmol/L (05-01 @ 03:33)  Potassium: 3.8 mmol/L (04-30 @ 05:44)  Potassium: 3.7 mmol/L (04-29 @ 19:22)          Blood Urea Nitrogen: 16 mg/dL (05-01 @ 03:33)  Blood Urea Nitrogen: 13 mg/dL (04-30 @ 05:44)  Blood Urea Nitrogen: 14 mg/dL (04-29 @ 19:22)          Creatinine 0.73  Creatinine 0.72  Creatinine 0.70          Magnesium: 1.8 mg/dL (05-01 @ 03:33)  Magnesium: 2.0 mg/dL (04-30 @ 05:44)  Magnesium: 1.4 mg/dL (04-29 @ 23:47)          Protein Total: 6.8 gm/dL (04-30 @ 05:44)  Protein Total: 7.3 gm/dL (04-29 @ 19:22)                  Calcium: 8.4 mg/dL (05-01 @ 03:33)  Calcium: 8.8 mg/dL (04-30 @ 05:44)  Calcium: 8.9 mg/dL (04-29 @ 19:22)          Phosphorus: 3.3 mg/dL (05-01 @ 03:33)  Phosphorus: 4.1 mg/dL (04-30 @ 05:44)  Phosphorus: 3.9 mg/dL (04-29 @ 23:47)          Bilirubin Total: 0.5 mg/dL (04-30 @ 05:44)  Bilirubin Total: 0.6 mg/dL (04-29 @ 19:22)          Alanine Aminotransferase (ALT/SGPT): 45 U/L (04-30 @ 05:44)  Alanine Aminotransferase (ALT/SGPT): 44 U/L (04-29 @ 19:22)          Aspartate Aminotransferase (AST/SGOT): 37 U/L (04-30 @ 05:44)  Aspartate Aminotransferase (AST/SGOT): 33 U/L (04-29 @ 19:22)    05-01    135  |  102  |  15  ----------------------------<  104<H>  3.3<L>   |  28  |  0.64    Ca    8.6      01 May 2024 08:53  Phos  3.4     05-01  Mg     1.9     05-01                  INR - ( 01 May 2024 08:53 ) 1.62 ratio     INR: 1.36 ratio (04-30 @ 05:44)  INR: 1.50 ratio (04-29 @ 19:22)             RADIOLOGY:    EKG:  Atrial fibrillation with a rapid VR    Telemetry:  Atrial fibrillation/mildly rapid VR    ECHO: < from: TTE W or WO Ultrasound Enhancing Agent (04.30.24 @ 07:15) >  CONCLUSIONS:      1. Left ventricular cavity is normal in size. Left ventricular wall thickness is normal. Left ventricular systolic function is normal with an ejection fraction visually estimated at 55 to 60 %.   2. The left atrium is moderately dilated.   3. The right atrium is dilated.   4. Mild mitral regurgitation.   5. Mild to moderate tricuspid regurgitation.   6. Mild pulmonary hypertension.   7. Moderate to severe aortic stenosis.   8. There is moderate calcification of the mitral valve annulus.    < end of copied text >

## 2024-05-02 NOTE — PROGRESS NOTE ADULT - SUBJECTIVE AND OBJECTIVE BOX
Subjective:    pat better, 2lpm nc sat 96%, still congested & wheezy, sitting in bed, no more need of bipap.    Home Medications:  Acidophilus oral tablet: 1 tab(s) orally once a day (29 Apr 2024 22:44)  cholestyramine 4 g/5 g oral powder for reconstitution: 1 packet(s) orally once a day (in the morning) (29 Apr 2024 22:42)  ezetimibe 10 mg oral tablet: 1 tab(s) orally once a day (in the evening) (29 Apr 2024 22:43)  gabapentin 800 mg oral tablet: 1 tab(s) orally 3 times a day (29 Apr 2024 22:44)  losartan 25 mg oral tablet: 1 tab(s) orally once a day (29 Apr 2024 22:43)  metFORMIN 1000 mg oral tablet: 1 tab(s) orally 2 times a day (29 Apr 2024 22:43)  metoprolol succinate 50 mg oral tablet, extended release: 1 tab(s) orally once a day (29 Apr 2024 22:43)  montelukast 10 mg oral tablet: 1 tab(s) orally once a day (at bedtime) (29 Apr 2024 22:43)  pravastatin 10 mg oral tablet: 1 tab(s) orally once a day (in the evening) (29 Apr 2024 22:43)  predniSONE 5 mg oral tablet: 1 tab(s) orally once a day (29 Apr 2024 22:43)  Vitamin D3 25 mcg (1000 intl units) oral capsule: 1 cap(s) orally once a day (29 Apr 2024 22:44)  warfarin 5 mg oral tablet: 1 tab(s) orally once a day (at bedtime) (29 Apr 2024 22:44)  zolpidem 10 mg oral tablet: 1 tab(s) orally once a day (at bedtime) (29 Apr 2024 22:43)    MEDICATIONS  (STANDING):  acetylcysteine 20%  Inhalation 4 milliLiter(s) Inhalation every 12 hours  albuterol    0.083% 2.5 milliGRAM(s) Nebulizer every 6 hours  albuterol    90 MICROgram(s) HFA Inhaler 1 Puff(s) Inhalation every 4 hours  albuterol/ipratropium for Nebulization 3 milliLiter(s) Nebulizer every 6 hours  atorvastatin 10 milliGRAM(s) Oral at bedtime  chlorhexidine 4% Liquid 1 Application(s) Topical daily  cholestyramine Powder (Sugar-Free) 4 Gram(s) Oral daily  dextrose 10% Bolus 125 milliLiter(s) IV Bolus once  dextrose 5%. 1000 milliLiter(s) (50 mL/Hr) IV Continuous <Continuous>  dextrose 50% Injectable 25 Gram(s) IV Push once  enoxaparin Injectable 120 milliGRAM(s) SubCutaneous every 12 hours  furosemide   Injectable 40 milliGRAM(s) IV Push daily  gabapentin 800 milliGRAM(s) Oral three times a day  glucagon  Injectable 1 milliGRAM(s) IntraMuscular once  guaiFENesin  milliGRAM(s) Oral every 12 hours  influenza  Vaccine (HIGH DOSE) 0.7 milliLiter(s) IntraMuscular once  insulin lispro (ADMELOG) corrective regimen sliding scale   SubCutaneous Before meals and at bedtime  levalbuterol Inhalation 1.25 milliGRAM(s) Inhalation every 6 hours  methylPREDNISolone sodium succinate Injectable 40 milliGRAM(s) IV Push every 8 hours  metoprolol succinate ER 50 milliGRAM(s) Oral every 12 hours  montelukast 10 milliGRAM(s) Oral at bedtime  oseltamivir 75 milliGRAM(s) Oral two times a day  potassium chloride    Tablet ER 40 milliEquivalent(s) Oral daily  warfarin 5 milliGRAM(s) Oral daily    MEDICATIONS  (PRN):  benzonatate 100 milliGRAM(s) Oral every 8 hours PRN Cough  dextrose Oral Gel 15 Gram(s) Oral once PRN Blood Glucose LESS THAN 70 milliGRAM(s)/deciliter      Allergies    penicillin (Hives; Urticaria)  PC Pen VK (Rash)  latex (Unknown)  [This allergen will not trigger allergy alert] IV DYE, IODINE CONTRAST (Unknown)    Intolerances        Vital Signs Last 24 Hrs  T(C): 36.4 (02 May 2024 16:10), Max: 37.2 (02 May 2024 03:15)  T(F): 97.6 (02 May 2024 16:10), Max: 98.9 (02 May 2024 03:15)  HR: 104 (02 May 2024 16:10) (56 - 131)  BP: 92/63 (02 May 2024 16:10) (92/63 - 153/62)  BP(mean): 83 (02 May 2024 09:19) (83 - 83)  RR: 18 (02 May 2024 16:10) (18 - 20)  SpO2: 96% (02 May 2024 16:10) (95% - 99%)    Parameters below as of 02 May 2024 16:10  Patient On (Oxygen Delivery Method): nasal cannula  O2 Flow (L/min): 3        PHYSICAL EXAMINATION:    NECK:  Supple. No lymphadenopathy. Jugular venous pressure not elevated. Carotids equal.   HEART:   The cardiac impulse has a normal quality. Reg., Nl S1 and S2.  There are no murmurs, rubs or gallops noted  CHEST:  Chest crackles to auscultation. Normal respiratory effort.  ABDOMEN:  Soft and nontender.   EXTREMITIES:  There is no edema.       LABS:                        12.4   5.74  )-----------( 225      ( 02 May 2024 07:30 )             39.2     05-02    135  |  100  |  18  ----------------------------<  135<H>  3.6   |  30  |  0.63    Ca    8.5      02 May 2024 07:30  Phos  3.4     05-01  Mg     1.9     05-01      PT/INR - ( 02 May 2024 07:30 )   PT: 19.1 sec;   INR: 1.72 ratio         PTT - ( 02 May 2024 07:30 )  PTT:47.0 sec  Urinalysis Basic - ( 02 May 2024 07:30 )    Color: x / Appearance: x / SG: x / pH: x  Gluc: 135 mg/dL / Ketone: x  / Bili: x / Urobili: x   Blood: x / Protein: x / Nitrite: x   Leuk Esterase: x / RBC: x / WBC x   Sq Epi: x / Non Sq Epi: x / Bacteria: x

## 2024-05-02 NOTE — CONSULT NOTE ADULT - CONVERSATION DETAILS
bb We discussed the Palliative Care team being a supportive team when a patient has ongoing illnesses.  We also discussed that it is not an end-of-life care service, but can help navigate symptoms and emotional support throughout their hospital stay here. The patient states that she lives at home with her  and they were recently on a cruise. She still works ten hours a week at a [employer] and is hopeful that she will be able to return as she states it gives her some social interaction and a reason to get out of the house. The patient has never needed O2 at home but she has been speaking with cardiology about possibly needing a TAVR.  The patient at this time does not want to set any limits she is hopeful that she will continue to get better and that she won't be on the Bipap anymore either. The patient confirmed HCP naming their  with an alternative sontray Borges.   Will sign off as the patients goals are clear

## 2024-05-03 LAB
ANION GAP SERPL CALC-SCNC: 6 MMOL/L — SIGNIFICANT CHANGE UP (ref 5–17)
BUN SERPL-MCNC: 29 MG/DL — HIGH (ref 7–23)
CALCIUM SERPL-MCNC: 8.9 MG/DL — SIGNIFICANT CHANGE UP (ref 8.5–10.1)
CHLORIDE SERPL-SCNC: 103 MMOL/L — SIGNIFICANT CHANGE UP (ref 96–108)
CO2 SERPL-SCNC: 29 MMOL/L — SIGNIFICANT CHANGE UP (ref 22–31)
CREAT SERPL-MCNC: 0.7 MG/DL — SIGNIFICANT CHANGE UP (ref 0.5–1.3)
EGFR: 91 ML/MIN/1.73M2 — SIGNIFICANT CHANGE UP
GLUCOSE BLDC GLUCOMTR-MCNC: 178 MG/DL — HIGH (ref 70–99)
GLUCOSE BLDC GLUCOMTR-MCNC: 188 MG/DL — HIGH (ref 70–99)
GLUCOSE BLDC GLUCOMTR-MCNC: 263 MG/DL — HIGH (ref 70–99)
GLUCOSE BLDC GLUCOMTR-MCNC: 377 MG/DL — HIGH (ref 70–99)
GLUCOSE SERPL-MCNC: 197 MG/DL — HIGH (ref 70–99)
INR BLD: 2.47 RATIO — HIGH (ref 0.85–1.18)
MAGNESIUM SERPL-MCNC: 1.9 MG/DL — SIGNIFICANT CHANGE UP (ref 1.6–2.6)
POTASSIUM SERPL-MCNC: 4.1 MMOL/L — SIGNIFICANT CHANGE UP (ref 3.5–5.3)
POTASSIUM SERPL-SCNC: 4.1 MMOL/L — SIGNIFICANT CHANGE UP (ref 3.5–5.3)
PROTHROM AB SERPL-ACNC: 27.2 SEC — HIGH (ref 9.5–13)
SODIUM SERPL-SCNC: 138 MMOL/L — SIGNIFICANT CHANGE UP (ref 135–145)

## 2024-05-03 PROCEDURE — 99232 SBSQ HOSP IP/OBS MODERATE 35: CPT

## 2024-05-03 RX ORDER — FAMOTIDINE 10 MG/ML
20 INJECTION INTRAVENOUS DAILY
Refills: 0 | Status: DISCONTINUED | OUTPATIENT
Start: 2024-05-03 | End: 2024-05-07

## 2024-05-03 RX ORDER — ACETAMINOPHEN 500 MG
1000 TABLET ORAL ONCE
Refills: 0 | Status: COMPLETED | OUTPATIENT
Start: 2024-05-03 | End: 2024-05-03

## 2024-05-03 RX ORDER — FLUTICASONE PROPIONATE 50 MCG
1 SPRAY, SUSPENSION NASAL
Refills: 0 | Status: DISCONTINUED | OUTPATIENT
Start: 2024-05-03 | End: 2024-05-07

## 2024-05-03 RX ORDER — ZOLPIDEM TARTRATE 10 MG/1
5 TABLET ORAL AT BEDTIME
Refills: 0 | Status: DISCONTINUED | OUTPATIENT
Start: 2024-05-03 | End: 2024-05-07

## 2024-05-03 RX ADMIN — ENOXAPARIN SODIUM 120 MILLIGRAM(S): 100 INJECTION SUBCUTANEOUS at 10:48

## 2024-05-03 RX ADMIN — Medication 100 MILLIGRAM(S): at 10:50

## 2024-05-03 RX ADMIN — LEVALBUTEROL 1.25 MILLIGRAM(S): 1.25 SOLUTION, CONCENTRATE RESPIRATORY (INHALATION) at 15:06

## 2024-05-03 RX ADMIN — GABAPENTIN 800 MILLIGRAM(S): 400 CAPSULE ORAL at 23:11

## 2024-05-03 RX ADMIN — Medication 40 MILLIGRAM(S): at 23:12

## 2024-05-03 RX ADMIN — Medication 40 MILLIEQUIVALENT(S): at 10:49

## 2024-05-03 RX ADMIN — Medication 3: at 18:01

## 2024-05-03 RX ADMIN — Medication 4 MILLILITER(S): at 21:18

## 2024-05-03 RX ADMIN — Medication 50 MILLIGRAM(S): at 23:10

## 2024-05-03 RX ADMIN — LEVALBUTEROL 1.25 MILLIGRAM(S): 1.25 SOLUTION, CONCENTRATE RESPIRATORY (INHALATION) at 08:31

## 2024-05-03 RX ADMIN — Medication 600 MILLIGRAM(S): at 23:12

## 2024-05-03 RX ADMIN — MONTELUKAST 10 MILLIGRAM(S): 4 TABLET, CHEWABLE ORAL at 23:14

## 2024-05-03 RX ADMIN — CHOLESTYRAMINE 4 GRAM(S): 4 POWDER, FOR SUSPENSION ORAL at 10:48

## 2024-05-03 RX ADMIN — LEVALBUTEROL 1.25 MILLIGRAM(S): 1.25 SOLUTION, CONCENTRATE RESPIRATORY (INHALATION) at 21:18

## 2024-05-03 RX ADMIN — Medication 400 MILLIGRAM(S): at 18:29

## 2024-05-03 RX ADMIN — Medication 75 MILLIGRAM(S): at 10:49

## 2024-05-03 RX ADMIN — Medication 40 MILLIGRAM(S): at 10:49

## 2024-05-03 RX ADMIN — Medication 600 MILLIGRAM(S): at 10:49

## 2024-05-03 RX ADMIN — GABAPENTIN 800 MILLIGRAM(S): 400 CAPSULE ORAL at 14:17

## 2024-05-03 RX ADMIN — Medication 1 SPRAY(S): at 23:12

## 2024-05-03 RX ADMIN — LEVALBUTEROL 1.25 MILLIGRAM(S): 1.25 SOLUTION, CONCENTRATE RESPIRATORY (INHALATION) at 02:29

## 2024-05-03 RX ADMIN — Medication 75 MILLIGRAM(S): at 23:13

## 2024-05-03 RX ADMIN — WARFARIN SODIUM 5 MILLIGRAM(S): 2.5 TABLET ORAL at 23:16

## 2024-05-03 RX ADMIN — GABAPENTIN 800 MILLIGRAM(S): 400 CAPSULE ORAL at 06:54

## 2024-05-03 RX ADMIN — ATORVASTATIN CALCIUM 10 MILLIGRAM(S): 80 TABLET, FILM COATED ORAL at 23:11

## 2024-05-03 RX ADMIN — Medication 1: at 23:26

## 2024-05-03 RX ADMIN — ZOLPIDEM TARTRATE 5 MILLIGRAM(S): 10 TABLET ORAL at 23:23

## 2024-05-03 RX ADMIN — Medication 4 MILLILITER(S): at 08:32

## 2024-05-03 RX ADMIN — Medication 50 MILLIGRAM(S): at 10:49

## 2024-05-03 RX ADMIN — FAMOTIDINE 20 MILLIGRAM(S): 10 INJECTION INTRAVENOUS at 23:08

## 2024-05-03 RX ADMIN — Medication 40 MILLIGRAM(S): at 10:48

## 2024-05-03 RX ADMIN — Medication 1: at 09:18

## 2024-05-03 RX ADMIN — Medication 3: at 18:34

## 2024-05-03 NOTE — PROGRESS NOTE ADULT - SUBJECTIVE AND OBJECTIVE BOX
CHIEF COMPLAINT: Patient is a 73y old  Female who presents with a chief complaint of CHF exacerbation , Flu (30 Apr 2024 15:44)      HPI: HPI:  72 y/o F with PMHx of HTN, DM2, AS, CAD, PVD, HFpEF (55%) Afib on Coumadin s/p ablation presents to the ED BIBEMS for progressive shortness of breath and JETT. Pt states that she just came back from a cruise yesterday and that over the past 2 days she has felt increasingly short of breath. She notes she had a fever of 101 at home and has noticed worsening LE edema. Pt denies CP, abdominal pain but does admit to nausea and diarrhea last night. She notes that for the past 2 days she has had very little PO intake since feeling ill. Pt's  who she came in with is also sick. In the ED pt was found to be Flu A  positive and with increased WOB, BNP of 1800 was placed on Bipap and found to be in Afib w RVR. ICU consulted for the above.  (29 Apr 2024 23:00)      PMHx: PAST MEDICAL & SURGICAL HISTORY:  Diabetes mellitus type II, controlled      Atrial fibrillation      Congestive heart failure      Dyspnea      Morbid obesity with BMI of 40.0-44.9, adult      Neuropathy      Peripheral venous insufficiency      Thyroid nodule      HTN (hypertension)      Cellulitis      At risk for sleep apnea      History of esophagogastroduodenoscopy (EGD)      H/O colonoscopy      Other complications of gastric band procedure      S/P left knee arthroscopy      Status post medial meniscus repair      History of cholecystectomy      S/P cataract surgery            Soc Hx:     FAMILY HISTORY:  Family history of breast cancer in mother    Family history of diabetes mellitus in mother    FHx: heart disease (Sibling)        Allergies: Allergies    penicillin (Hives; Urticaria)  PC Pen VK (Rash)  latex (Unknown)  [This allergen will not trigger allergy alert] IV DYE, IODINE CONTRAST (Unknown)    Intolerances          REVIEW OF SYSTEMS:    As above  No chest pain + shortness of breath  No lightheadeness or syncope  No leg swelling  No palpitations  No claudication-like symptoms    ICU Vital Signs Last 24 Hrs  T(C): 36.6 (02 May 2024 21:51), Max: 36.6 (02 May 2024 21:51)  T(F): 97.8 (02 May 2024 21:51), Max: 97.8 (02 May 2024 21:51)  HR: 109 (03 May 2024 02:19) (56 - 116)  BP: 118/76 (02 May 2024 21:51) (92/63 - 153/62)  BP(mean): 83 (02 May 2024 09:19) (83 - 83)  ABP: --  ABP(mean): --  RR: 16 (02 May 2024 21:51) (16 - 18)  SpO2: 98% (03 May 2024 02:19) (95% - 99%)    O2 Parameters below as of 03 May 2024 02:19  Patient On (Oxygen Delivery Method): nasal cannula                PHYSICAL EXAM:   Patient in mild distress  Neck: No JVD; Carotids:  2+ without bruits  Respiratory:  Diffuse rhonchi  Cardiovascular: S1 and S2, iregular rate and rhythm + syst m        MEDICATIONS  (STANDING):  acetylcysteine 20%  Inhalation 4 milliLiter(s) Inhalation every 12 hours  albuterol    0.083% 2.5 milliGRAM(s) Nebulizer every 6 hours  albuterol    90 MICROgram(s) HFA Inhaler 1 Puff(s) Inhalation every 4 hours  albuterol/ipratropium for Nebulization 3 milliLiter(s) Nebulizer every 6 hours  atorvastatin 10 milliGRAM(s) Oral at bedtime  chlorhexidine 4% Liquid 1 Application(s) Topical daily  cholestyramine Powder (Sugar-Free) 4 Gram(s) Oral daily  dextrose 10% Bolus 125 milliLiter(s) IV Bolus once  dextrose 5%. 1000 milliLiter(s) (50 mL/Hr) IV Continuous <Continuous>  dextrose 50% Injectable 25 Gram(s) IV Push once  enoxaparin Injectable 120 milliGRAM(s) SubCutaneous every 12 hours  furosemide   Injectable 40 milliGRAM(s) IV Push daily  gabapentin 800 milliGRAM(s) Oral three times a day  glucagon  Injectable 1 milliGRAM(s) IntraMuscular once  guaiFENesin  milliGRAM(s) Oral every 12 hours  influenza  Vaccine (HIGH DOSE) 0.7 milliLiter(s) IntraMuscular once  insulin lispro (ADMELOG) corrective regimen sliding scale   SubCutaneous Before meals and at bedtime  levalbuterol Inhalation 1.25 milliGRAM(s) Inhalation every 6 hours  methylPREDNISolone sodium succinate Injectable 40 milliGRAM(s) IV Push every 12 hours  metoprolol succinate ER 50 milliGRAM(s) Oral every 12 hours  montelukast 10 milliGRAM(s) Oral at bedtime  oseltamivir 75 milliGRAM(s) Oral two times a day  potassium chloride    Tablet ER 40 milliEquivalent(s) Oral daily  warfarin 5 milliGRAM(s) Oral daily        Home Medications:  Acidophilus oral tablet: 1 tab(s) orally once a day (29 Apr 2024 22:44)  cholestyramine 4 g/5 g oral powder for reconstitution: 1 packet(s) orally once a day (in the morning) (29 Apr 2024 22:42)  ezetimibe 10 mg oral tablet: 1 tab(s) orally once a day (in the evening) (29 Apr 2024 22:43)  gabapentin 800 mg oral tablet: 1 tab(s) orally 3 times a day (29 Apr 2024 22:44)  losartan 25 mg oral tablet: 1 tab(s) orally once a day (29 Apr 2024 22:43)  metFORMIN 1000 mg oral tablet: 1 tab(s) orally 2 times a day (29 Apr 2024 22:43)  metoprolol succinate 50 mg oral tablet, extended release: 1 tab(s) orally once a day (29 Apr 2024 22:43)  montelukast 10 mg oral tablet: 1 tab(s) orally once a day (at bedtime) (29 Apr 2024 22:43)  pravastatin 10 mg oral tablet: 1 tab(s) orally once a day (in the evening) (29 Apr 2024 22:43)  predniSONE 5 mg oral tablet: 1 tab(s) orally once a day (29 Apr 2024 22:43)  Vitamin D3 25 mcg (1000 intl units) oral capsule: 1 cap(s) orally once a day (29 Apr 2024 22:44)  warfarin 5 mg oral tablet: 1 tab(s) orally once a day (at bedtime) (29 Apr 2024 22:44)  zolpidem 10 mg oral tablet: 1 tab(s) orally once a day (at bedtime) (29 Apr 2024 22:43)        LABS: All Labs Reviewed:  Blood Culture: Organism --  Gram Stain Blood -- Gram Stain --  Specimen Source Clean Catch None  Culture-Blood --    Organism --  Gram Stain Blood -- Gram Stain --  Specimen Source .Blood None  Culture-Blood --      BNP Pro-Brain Natriuretic Peptide (04.29.24 @ 19:22) Pro-Brain Natriuretic Peptide: 1882 pg/mL                        11.5   4.40  )-----------( 232      ( 01 May 2024 08:53 )             37.0                         12.4   5.74  )-----------( 225      ( 02 May 2024 07:30 )             39.2                                 Cardiac markers   Troponin I, High Sensitivity (04.29.24 @ 19:22) Troponin I, High Sensitivity Result: 16.14                                       Chems        Sodium: 134 mmol/L (05-01 @ 03:33)  Sodium: 135 mmol/L (04-30 @ 05:44)  Sodium: 137 mmol/L (04-29 @ 19:22)          Potassium: 3.7 mmol/L (05-01 @ 03:33)  Potassium: 3.8 mmol/L (04-30 @ 05:44)  Potassium: 3.7 mmol/L (04-29 @ 19:22)          Blood Urea Nitrogen: 16 mg/dL (05-01 @ 03:33)  Blood Urea Nitrogen: 13 mg/dL (04-30 @ 05:44)  Blood Urea Nitrogen: 14 mg/dL (04-29 @ 19:22)          Creatinine 0.73  Creatinine 0.72  Creatinine 0.70          Magnesium: 1.8 mg/dL (05-01 @ 03:33)  Magnesium: 2.0 mg/dL (04-30 @ 05:44)  Magnesium: 1.4 mg/dL (04-29 @ 23:47)          Protein Total: 6.8 gm/dL (04-30 @ 05:44)  Protein Total: 7.3 gm/dL (04-29 @ 19:22)                  Calcium: 8.4 mg/dL (05-01 @ 03:33)  Calcium: 8.8 mg/dL (04-30 @ 05:44)  Calcium: 8.9 mg/dL (04-29 @ 19:22)          Phosphorus: 3.3 mg/dL (05-01 @ 03:33)  Phosphorus: 4.1 mg/dL (04-30 @ 05:44)  Phosphorus: 3.9 mg/dL (04-29 @ 23:47)          Bilirubin Total: 0.5 mg/dL (04-30 @ 05:44)  Bilirubin Total: 0.6 mg/dL (04-29 @ 19:22)          Alanine Aminotransferase (ALT/SGPT): 45 U/L (04-30 @ 05:44)  Alanine Aminotransferase (ALT/SGPT): 44 U/L (04-29 @ 19:22)          Aspartate Aminotransferase (AST/SGOT): 37 U/L (04-30 @ 05:44)  Aspartate Aminotransferase (AST/SGOT): 33 U/L (04-29 @ 19:22)    05-01    135  |  102  |  15  ----------------------------<  104<H>  3.3<L>   |  28  |  0.64    Ca    8.6      01 May 2024 08:53  Phos  3.4     05-01  Mg     1.9     05-01 05-02    135  |  100  |  18  ----------------------------<  135<H>  3.6   |  30  |  0.63    Ca    8.5      02 May 2024 07:30  Phos  3.4     05-01  Mg     1.9     05-01        PTT - ( 02 May 2024 07:30 )  INR: 1.72 ratio  INR - ( 01 May 2024 08:53 ) 1.62 ratio   INR: 1.36 ratio (04-30 @ 05:44)  INR: 1.50 ratio (04-29 @ 19:22)             RADIOLOGY:    EKG:  Atrial fibrillation with a rapid VR    Telemetry:  Atrial fibrillation/controlled VR VR    ECHO: < from: TTE W or WO Ultrasound Enhancing Agent (04.30.24 @ 07:15) >  CONCLUSIONS:      1. Left ventricular cavity is normal in size. Left ventricular wall thickness is normal. Left ventricular systolic function is normal with an ejection fraction visually estimated at 55 to 60 %.   2. The left atrium is moderately dilated.   3. The right atrium is dilated.   4. Mild mitral regurgitation.   5. Mild to moderate tricuspid regurgitation.   6. Mild pulmonary hypertension.   7. Moderate to severe aortic stenosis.   8. There is moderate calcification of the mitral valve annulus.    < end of copied text >

## 2024-05-03 NOTE — PROGRESS NOTE ADULT - ASSESSMENT
72 yo female with PMHx of HTN, DM2, AS, CAD, PVD, HFpEF (55%) Afib on Coumadin s/p ablation 2023 presents to the ED BIBEMS for progressive shortness of breath and JETT admitted for acute hypoxemic respiratory failure due to influenza and acute exacerbation of CHF requiring Bipap. Stabilized off bipap on 02 , transferred to tele     # Acute hypoxic resp failure - multifactorial due to 1  # Decompensated HFpEF suspect due to noncompliance with diet  # Viral URI 2/2 + Influenza A   - tamiflu x 5 days   - monitor temps  - Blood cultures no growth  - diurese, currently of Lasix 40 daily  - echo preserved EF. moderate to severe AS  - Mucinex  - Chest PT  - Solumedrol BID, taper tomorrow if improving     #a.fib with RVR due to the above  - BB increased to Toprol 50 BID, improved   - INR within goal. Discontinue Lovenox. Continue Warfarin     # Hypothyroidism  - TSH low but normal t4. Repeat outpatient     #Positive urine culture  Results noted. asymptomatic. No need to tx    DISPO: Pending clinical improvement, likely discharge in next 2-3 days

## 2024-05-03 NOTE — PROGRESS NOTE ADULT - SUBJECTIVE AND OBJECTIVE BOX
Subjective:    pat better, still congested cough, sitting in bed, some PND.    Home Medications:  Acidophilus oral tablet: 1 tab(s) orally once a day (29 Apr 2024 22:44)  cholestyramine 4 g/5 g oral powder for reconstitution: 1 packet(s) orally once a day (in the morning) (29 Apr 2024 22:42)  ezetimibe 10 mg oral tablet: 1 tab(s) orally once a day (in the evening) (29 Apr 2024 22:43)  gabapentin 800 mg oral tablet: 1 tab(s) orally 3 times a day (29 Apr 2024 22:44)  losartan 25 mg oral tablet: 1 tab(s) orally once a day (29 Apr 2024 22:43)  metFORMIN 1000 mg oral tablet: 1 tab(s) orally 2 times a day (29 Apr 2024 22:43)  metoprolol succinate 50 mg oral tablet, extended release: 1 tab(s) orally once a day (29 Apr 2024 22:43)  montelukast 10 mg oral tablet: 1 tab(s) orally once a day (at bedtime) (29 Apr 2024 22:43)  pravastatin 10 mg oral tablet: 1 tab(s) orally once a day (in the evening) (29 Apr 2024 22:43)  predniSONE 5 mg oral tablet: 1 tab(s) orally once a day (29 Apr 2024 22:43)  Vitamin D3 25 mcg (1000 intl units) oral capsule: 1 cap(s) orally once a day (29 Apr 2024 22:44)  warfarin 5 mg oral tablet: 1 tab(s) orally once a day (at bedtime) (29 Apr 2024 22:44)  zolpidem 10 mg oral tablet: 1 tab(s) orally once a day (at bedtime) (29 Apr 2024 22:43)    MEDICATIONS  (STANDING):  acetylcysteine 20%  Inhalation 4 milliLiter(s) Inhalation every 12 hours  albuterol    0.083% 2.5 milliGRAM(s) Nebulizer every 6 hours  albuterol    90 MICROgram(s) HFA Inhaler 1 Puff(s) Inhalation every 4 hours  albuterol/ipratropium for Nebulization 3 milliLiter(s) Nebulizer every 6 hours  atorvastatin 10 milliGRAM(s) Oral at bedtime  chlorhexidine 4% Liquid 1 Application(s) Topical daily  cholestyramine Powder (Sugar-Free) 4 Gram(s) Oral daily  dextrose 10% Bolus 125 milliLiter(s) IV Bolus once  dextrose 5%. 1000 milliLiter(s) (50 mL/Hr) IV Continuous <Continuous>  dextrose 50% Injectable 25 Gram(s) IV Push once  furosemide   Injectable 40 milliGRAM(s) IV Push daily  gabapentin 800 milliGRAM(s) Oral three times a day  glucagon  Injectable 1 milliGRAM(s) IntraMuscular once  guaiFENesin  milliGRAM(s) Oral every 12 hours  influenza  Vaccine (HIGH DOSE) 0.7 milliLiter(s) IntraMuscular once  insulin lispro (ADMELOG) corrective regimen sliding scale   SubCutaneous Before meals and at bedtime  levalbuterol Inhalation 1.25 milliGRAM(s) Inhalation every 6 hours  methylPREDNISolone sodium succinate Injectable 40 milliGRAM(s) IV Push every 12 hours  metoprolol succinate ER 50 milliGRAM(s) Oral every 12 hours  montelukast 10 milliGRAM(s) Oral at bedtime  oseltamivir 75 milliGRAM(s) Oral two times a day  potassium chloride    Tablet ER 40 milliEquivalent(s) Oral daily  warfarin 5 milliGRAM(s) Oral daily    MEDICATIONS  (PRN):  benzonatate 100 milliGRAM(s) Oral every 8 hours PRN Cough  dextrose Oral Gel 15 Gram(s) Oral once PRN Blood Glucose LESS THAN 70 milliGRAM(s)/deciliter      Allergies    penicillin (Hives; Urticaria)  PC Pen VK (Rash)  latex (Unknown)  [This allergen will not trigger allergy alert] IV DYE, IODINE CONTRAST (Unknown)    Intolerances        Vital Signs Last 24 Hrs  T(C): 36.9 (03 May 2024 09:49), Max: 36.9 (03 May 2024 09:49)  T(F): 98.4 (03 May 2024 09:49), Max: 98.4 (03 May 2024 09:49)  HR: 124 (03 May 2024 09:49) (100 - 124)  BP: 143/98 (03 May 2024 09:49) (92/63 - 143/98)  BP(mean): --  RR: 20 (03 May 2024 09:49) (16 - 20)  SpO2: 94% (03 May 2024 09:49) (94% - 99%)    Parameters below as of 03 May 2024 09:49  Patient On (Oxygen Delivery Method): nasal cannula  O2 Flow (L/min): 2        PHYSICAL EXAMINATION:    NECK:  Supple. No lymphadenopathy. Jugular venous pressure not elevated. Carotids equal.   HEART:   The cardiac impulse has a normal quality. Reg., Nl S1 and S2.  There are no murmurs, rubs or gallops noted  CHEST:  Chest crackles to auscultation. Normal respiratory effort.  ABDOMEN:  Soft and nontender.   EXTREMITIES:  There is no edema.       LABS:                        12.4   5.74  )-----------( 225      ( 02 May 2024 07:30 )             39.2     05-03    138  |  103  |  29<H>  ----------------------------<  197<H>  4.1   |  29  |  0.70    Ca    8.9      03 May 2024 07:55  Mg     1.9     05-03      PT/INR - ( 03 May 2024 07:55 )   PT: 27.2 sec;   INR: 2.47 ratio         PTT - ( 02 May 2024 07:30 )  PTT:47.0 sec  Urinalysis Basic - ( 03 May 2024 07:55 )    Color: x / Appearance: x / SG: x / pH: x  Gluc: 197 mg/dL / Ketone: x  / Bili: x / Urobili: x   Blood: x / Protein: x / Nitrite: x   Leuk Esterase: x / RBC: x / WBC x   Sq Epi: x / Non Sq Epi: x / Bacteria: x

## 2024-05-03 NOTE — PROGRESS NOTE ADULT - ASSESSMENT
73 year old woman with the above PMH admitted with shortness of breath.  Work up so far reveals acute on chronic HFpEF, recurrent atrial fibrillation with a rapid VR, - ACS, and + influenza A on oseltsmsavir.  TTE confirms HFpEF but also moderate to severe aortic stenosis.  Will continue IV furosemide and metoprolol.  Her INR is subtherapeutic and she is currently on enoxaparin.  The patient will ultimately require a repeat ablation and a possible TAVR    5/2/24-Afib with increased VR-will change metoprolol to bid.  Kcl low-will give it daily  5/3/24-Cardiac status relatively stable.  Continue present management.

## 2024-05-03 NOTE — PROGRESS NOTE ADULT - ASSESSMENT
Acute hypoxic resp failure - multifactorial   Decompensated HFpEF suspect due to noncompliance with diet  Viral URI 2/2 + Influenza A   A.fib with RVR due to the above  Hypothyroidism    PLAN:    pulmonary better  IV solumedrol 40mg q12  Neb with mucomyst  Chest PT with nasal saline solution  Tamiflu x 5 days  Trend WBC  Monitor temps. f/u blood and urine cultures.   Diurese  Echo  Afib with RVR Lovenox 120mg bid to bridge warfarin 5mg qd.  DVT PROPHYLASIX

## 2024-05-03 NOTE — PROGRESS NOTE ADULT - SUBJECTIVE AND OBJECTIVE BOX
HOSPITALIST PROGRESS NOTE    74 yo female with PMHx of HTN, DM2, AS, CAD, PVD, HFpEF (55%) Afib on Coumadin s/p ablation 2023 presents to the ED BIBEMS for progressive shortness of breath and JETT admitted for acute hypoxemic respiratory failure due to influenza and acute exacerbation of CHF requiring Bipap. Stabilized off bipap on 02 , transferred to [facility]     5/2: Patient seen and examined at bedside. Still SOB, symptoms slightly better. HR still elevated. Inhalers switched to Xopenex due to tachycardia.     5/3: Patient seen and examined at bedside. SOB improving. Overall feels less congested. HR improved. Complains of neuropathic LE pain - chronic. No further complaints.     ROS: All 10 systems reviewed and found to be negative with the exception of what has been described above.     VITAL SIGNS:  Vital Signs Last 24 Hrs  T(C): 36.9 (03 May 2024 09:49), Max: 36.9 (03 May 2024 09:49)  T(F): 98.4 (03 May 2024 09:49), Max: 98.4 (03 May 2024 09:49)  HR: 95 (03 May 2024 15:05) (95 - 124)  BP: 143/98 (03 May 2024 09:49) (118/76 - 143/98)  BP(mean): --  RR: 20 (03 May 2024 09:49) (16 - 20)  SpO2: 98% (03 May 2024 15:05) (94% - 99%)    Parameters below as of 03 May 2024 15:05  Patient On (Oxygen Delivery Method): nasal cannula    PHYSICAL EXAM:  General: comfortable, no acute distress  HEENT: Atraumatic, no LAD, trachea midline, PERRLA  Cardiovascular: normal s1s2, no murmurs, gallops or friction rubs  Pulmonary: + congestion / + wheezing  Gastrointestinal: soft non tender non distended, no masses felt, no organomegaly  Muscloskeletal: no lower extremity edema, intact bilateral lower extremity pulses  Neurological: CN II-12 intact. No focal weakness  Psychiatrical: normal mood, cooperative  SKIN: no rash, lesions or ulcers    MEDICATIONS:  MEDICATIONS  (STANDING):  acetylcysteine 20%  Inhalation 4 milliLiter(s) Inhalation every 12 hours  albuterol    0.083% 2.5 milliGRAM(s) Nebulizer every 6 hours  albuterol    90 MICROgram(s) HFA Inhaler 1 Puff(s) Inhalation every 4 hours  albuterol/ipratropium for Nebulization 3 milliLiter(s) Nebulizer every 6 hours  atorvastatin 10 milliGRAM(s) Oral at bedtime  chlorhexidine 4% Liquid 1 Application(s) Topical daily  cholestyramine Powder (Sugar-Free) 4 Gram(s) Oral daily  dextrose 10% Bolus 125 milliLiter(s) IV Bolus once  dextrose 5%. 1000 milliLiter(s) (50 mL/Hr) IV Continuous <Continuous>  dextrose 50% Injectable 25 Gram(s) IV Push once  fluticasone propionate 50 MICROgram(s)/spray Nasal Spray 1 Spray(s) Both Nostrils two times a day  furosemide   Injectable 40 milliGRAM(s) IV Push daily  gabapentin 800 milliGRAM(s) Oral three times a day  glucagon  Injectable 1 milliGRAM(s) IntraMuscular once  guaiFENesin  milliGRAM(s) Oral every 12 hours  influenza  Vaccine (HIGH DOSE) 0.7 milliLiter(s) IntraMuscular once  insulin lispro (ADMELOG) corrective regimen sliding scale   SubCutaneous Before meals and at bedtime  levalbuterol Inhalation 1.25 milliGRAM(s) Inhalation every 6 hours  methylPREDNISolone sodium succinate Injectable 40 milliGRAM(s) IV Push every 12 hours  metoprolol succinate ER 50 milliGRAM(s) Oral every 12 hours  montelukast 10 milliGRAM(s) Oral at bedtime  oseltamivir 75 milliGRAM(s) Oral two times a day  potassium chloride    Tablet ER 40 milliEquivalent(s) Oral daily  warfarin 5 milliGRAM(s) Oral daily    MEDICATIONS  (PRN):  benzonatate 100 milliGRAM(s) Oral every 8 hours PRN Cough  dextrose Oral Gel 15 Gram(s) Oral once PRN Blood Glucose LESS THAN 70 milliGRAM(s)/deciliter      ALLERGIES:  Allergies    penicillin (Hives; Urticaria)  PC Pen VK (Rash)  latex (Unknown)  [This allergen will not trigger allergy alert] IV DYE, IODINE CONTRAST (Unknown)    Intolerances        LABS:                        12.4   5.74  )-----------( 225      ( 02 May 2024 07:30 )             39.2     05-03    138  |  103  |  29<H>  ----------------------------<  197<H>  4.1   |  29  |  0.70    Ca    8.9      03 May 2024 07:55  Mg     1.9     05-03      PT/INR - ( 03 May 2024 07:55 )   PT: 27.2 sec;   INR: 2.47 ratio         PTT - ( 02 May 2024 07:30 )  PTT:47.0 sec  Urinalysis Basic - ( 03 May 2024 07:55 )    Color: x / Appearance: x / SG: x / pH: x  Gluc: 197 mg/dL / Ketone: x  / Bili: x / Urobili: x   Blood: x / Protein: x / Nitrite: x   Leuk Esterase: x / RBC: x / WBC x   Sq Epi: x / Non Sq Epi: x / Bacteria: x      CAPILLARY BLOOD GLUCOSE      POCT Blood Glucose.: 377 mg/dL (03 May 2024 12:08)        RADIOLOGY & ADDITIONAL TESTS: Reviewed. HOSPITALIST PROGRESS NOTE    74 yo female with PMHx of HTN, DM2, AS, CAD, PVD, HFpEF (55%) Afib on Coumadin s/p ablation 2023 presents to the ED BIBEMS for progressive shortness of breath and JETT admitted for acute hypoxemic respiratory failure due to influenza and acute exacerbation of CHF requiring Bipap. Stabilized off bipap on 02 , transferred to [facility]     5/2: Patient seen and examined at bedside. Still SOB, symptoms slightly better. HR still elevated. Inhalers switched to Xopenex due to tachycardia.     5/3: Patient seen and examined at bedside. SOB improving. Overall feels less congested. HR improved. Complains of neuropathic LE pain - chronic. No further complaints.     ROS: All 10 systems reviewed and found to be negative with the exception of what has been described above.     VITAL SIGNS:  Vital Signs Last 24 Hrs  T(C): 36.9 (03 May 2024 09:49), Max: 36.9 (03 May 2024 09:49)  T(F): 98.4 (03 May 2024 09:49), Max: 98.4 (03 May 2024 09:49)  HR: 95 (03 May 2024 15:05) (95 - 124)  BP: 143/98 (03 May 2024 09:49) (118/76 - 143/98)  BP(mean): --  RR: 20 (03 May 2024 09:49) (16 - 20)  SpO2: 98% (03 May 2024 15:05) (94% - 99%)    Parameters below as of 03 May 2024 15:05  Patient On (Oxygen Delivery Method): nasal cannula    PHYSICAL EXAM:  General: comfortable, no acute distress  HEENT: Atraumatic, no LAD, trachea midline, PERRLA  Cardiovascular: normal s1s2, no murmurs, gallops or friction rubs  Pulmonary: + congestion / + wheezing  Gastrointestinal: soft non tender non distended, no masses felt, no organomegaly  Extremities: Chronic bilateral LE venous stasis changes   Neurological: CN II-12 intact. No focal weakness  Psychiatrical: normal mood, cooperative  SKIN: no rash, lesions or ulcers    MEDICATIONS:  MEDICATIONS  (STANDING):  acetylcysteine 20%  Inhalation 4 milliLiter(s) Inhalation every 12 hours  albuterol    0.083% 2.5 milliGRAM(s) Nebulizer every 6 hours  albuterol    90 MICROgram(s) HFA Inhaler 1 Puff(s) Inhalation every 4 hours  albuterol/ipratropium for Nebulization 3 milliLiter(s) Nebulizer every 6 hours  atorvastatin 10 milliGRAM(s) Oral at bedtime  chlorhexidine 4% Liquid 1 Application(s) Topical daily  cholestyramine Powder (Sugar-Free) 4 Gram(s) Oral daily  dextrose 10% Bolus 125 milliLiter(s) IV Bolus once  dextrose 5%. 1000 milliLiter(s) (50 mL/Hr) IV Continuous <Continuous>  dextrose 50% Injectable 25 Gram(s) IV Push once  fluticasone propionate 50 MICROgram(s)/spray Nasal Spray 1 Spray(s) Both Nostrils two times a day  furosemide   Injectable 40 milliGRAM(s) IV Push daily  gabapentin 800 milliGRAM(s) Oral three times a day  glucagon  Injectable 1 milliGRAM(s) IntraMuscular once  guaiFENesin  milliGRAM(s) Oral every 12 hours  influenza  Vaccine (HIGH DOSE) 0.7 milliLiter(s) IntraMuscular once  insulin lispro (ADMELOG) corrective regimen sliding scale   SubCutaneous Before meals and at bedtime  levalbuterol Inhalation 1.25 milliGRAM(s) Inhalation every 6 hours  methylPREDNISolone sodium succinate Injectable 40 milliGRAM(s) IV Push every 12 hours  metoprolol succinate ER 50 milliGRAM(s) Oral every 12 hours  montelukast 10 milliGRAM(s) Oral at bedtime  oseltamivir 75 milliGRAM(s) Oral two times a day  potassium chloride    Tablet ER 40 milliEquivalent(s) Oral daily  warfarin 5 milliGRAM(s) Oral daily    MEDICATIONS  (PRN):  benzonatate 100 milliGRAM(s) Oral every 8 hours PRN Cough  dextrose Oral Gel 15 Gram(s) Oral once PRN Blood Glucose LESS THAN 70 milliGRAM(s)/deciliter      ALLERGIES:  Allergies    penicillin (Hives; Urticaria)  PC Pen VK (Rash)  latex (Unknown)  [This allergen will not trigger allergy alert] IV DYE, IODINE CONTRAST (Unknown)    Intolerances        LABS:                        12.4   5.74  )-----------( 225      ( 02 May 2024 07:30 )             39.2     05-03    138  |  103  |  29<H>  ----------------------------<  197<H>  4.1   |  29  |  0.70    Ca    8.9      03 May 2024 07:55  Mg     1.9     05-03      PT/INR - ( 03 May 2024 07:55 )   PT: 27.2 sec;   INR: 2.47 ratio         PTT - ( 02 May 2024 07:30 )  PTT:47.0 sec  Urinalysis Basic - ( 03 May 2024 07:55 )    Color: x / Appearance: x / SG: x / pH: x  Gluc: 197 mg/dL / Ketone: x  / Bili: x / Urobili: x   Blood: x / Protein: x / Nitrite: x   Leuk Esterase: x / RBC: x / WBC x   Sq Epi: x / Non Sq Epi: x / Bacteria: x      CAPILLARY BLOOD GLUCOSE      POCT Blood Glucose.: 377 mg/dL (03 May 2024 12:08)        RADIOLOGY & ADDITIONAL TESTS: Reviewed.

## 2024-05-04 LAB
ANION GAP SERPL CALC-SCNC: 6 MMOL/L — SIGNIFICANT CHANGE UP (ref 5–17)
BUN SERPL-MCNC: 35 MG/DL — HIGH (ref 7–23)
CALCIUM SERPL-MCNC: 8.9 MG/DL — SIGNIFICANT CHANGE UP (ref 8.5–10.1)
CHLORIDE SERPL-SCNC: 105 MMOL/L — SIGNIFICANT CHANGE UP (ref 96–108)
CO2 SERPL-SCNC: 28 MMOL/L — SIGNIFICANT CHANGE UP (ref 22–31)
CREAT SERPL-MCNC: 0.73 MG/DL — SIGNIFICANT CHANGE UP (ref 0.5–1.3)
EGFR: 86 ML/MIN/1.73M2 — SIGNIFICANT CHANGE UP
GLUCOSE BLDC GLUCOMTR-MCNC: 185 MG/DL — HIGH (ref 70–99)
GLUCOSE BLDC GLUCOMTR-MCNC: 238 MG/DL — HIGH (ref 70–99)
GLUCOSE BLDC GLUCOMTR-MCNC: 241 MG/DL — HIGH (ref 70–99)
GLUCOSE BLDC GLUCOMTR-MCNC: 339 MG/DL — HIGH (ref 70–99)
GLUCOSE SERPL-MCNC: 185 MG/DL — HIGH (ref 70–99)
INR BLD: 3.12 RATIO — HIGH (ref 0.85–1.18)
MAGNESIUM SERPL-MCNC: 2.2 MG/DL — SIGNIFICANT CHANGE UP (ref 1.6–2.6)
POTASSIUM SERPL-MCNC: 4.2 MMOL/L — SIGNIFICANT CHANGE UP (ref 3.5–5.3)
POTASSIUM SERPL-SCNC: 4.2 MMOL/L — SIGNIFICANT CHANGE UP (ref 3.5–5.3)
PROTHROM AB SERPL-ACNC: 34.1 SEC — HIGH (ref 9.5–13)
SODIUM SERPL-SCNC: 139 MMOL/L — SIGNIFICANT CHANGE UP (ref 135–145)

## 2024-05-04 PROCEDURE — 99232 SBSQ HOSP IP/OBS MODERATE 35: CPT

## 2024-05-04 RX ORDER — WARFARIN SODIUM 2.5 MG/1
3 TABLET ORAL ONCE
Refills: 0 | Status: COMPLETED | OUTPATIENT
Start: 2024-05-04 | End: 2024-05-04

## 2024-05-04 RX ORDER — ACETAMINOPHEN 500 MG
1000 TABLET ORAL ONCE
Refills: 0 | Status: COMPLETED | OUTPATIENT
Start: 2024-05-04 | End: 2024-05-04

## 2024-05-04 RX ADMIN — GABAPENTIN 800 MILLIGRAM(S): 400 CAPSULE ORAL at 06:51

## 2024-05-04 RX ADMIN — ATORVASTATIN CALCIUM 10 MILLIGRAM(S): 80 TABLET, FILM COATED ORAL at 22:38

## 2024-05-04 RX ADMIN — Medication 4 MILLILITER(S): at 21:14

## 2024-05-04 RX ADMIN — Medication 2: at 22:35

## 2024-05-04 RX ADMIN — Medication 1 SPRAY(S): at 13:14

## 2024-05-04 RX ADMIN — Medication 400 MILLIGRAM(S): at 22:35

## 2024-05-04 RX ADMIN — Medication 4: at 18:32

## 2024-05-04 RX ADMIN — MONTELUKAST 10 MILLIGRAM(S): 4 TABLET, CHEWABLE ORAL at 22:36

## 2024-05-04 RX ADMIN — Medication 75 MILLIGRAM(S): at 11:37

## 2024-05-04 RX ADMIN — ZOLPIDEM TARTRATE 5 MILLIGRAM(S): 10 TABLET ORAL at 22:37

## 2024-05-04 RX ADMIN — Medication 40 MILLIGRAM(S): at 11:39

## 2024-05-04 RX ADMIN — Medication 100 MILLIGRAM(S): at 22:36

## 2024-05-04 RX ADMIN — LEVALBUTEROL 1.25 MILLIGRAM(S): 1.25 SOLUTION, CONCENTRATE RESPIRATORY (INHALATION) at 21:12

## 2024-05-04 RX ADMIN — Medication 40 MILLIGRAM(S): at 11:38

## 2024-05-04 RX ADMIN — FAMOTIDINE 20 MILLIGRAM(S): 10 INJECTION INTRAVENOUS at 11:39

## 2024-05-04 RX ADMIN — GABAPENTIN 800 MILLIGRAM(S): 400 CAPSULE ORAL at 15:46

## 2024-05-04 RX ADMIN — LEVALBUTEROL 1.25 MILLIGRAM(S): 1.25 SOLUTION, CONCENTRATE RESPIRATORY (INHALATION) at 11:14

## 2024-05-04 RX ADMIN — WARFARIN SODIUM 3 MILLIGRAM(S): 2.5 TABLET ORAL at 22:37

## 2024-05-04 RX ADMIN — GABAPENTIN 800 MILLIGRAM(S): 400 CAPSULE ORAL at 22:34

## 2024-05-04 RX ADMIN — Medication 600 MILLIGRAM(S): at 22:36

## 2024-05-04 RX ADMIN — Medication 50 MILLIGRAM(S): at 11:39

## 2024-05-04 RX ADMIN — Medication 2: at 12:04

## 2024-05-04 RX ADMIN — LEVALBUTEROL 1.25 MILLIGRAM(S): 1.25 SOLUTION, CONCENTRATE RESPIRATORY (INHALATION) at 02:03

## 2024-05-04 RX ADMIN — Medication 1 SPRAY(S): at 22:37

## 2024-05-04 RX ADMIN — Medication 600 MILLIGRAM(S): at 11:37

## 2024-05-04 RX ADMIN — LEVALBUTEROL 1.25 MILLIGRAM(S): 1.25 SOLUTION, CONCENTRATE RESPIRATORY (INHALATION) at 16:17

## 2024-05-04 RX ADMIN — Medication 75 MILLIGRAM(S): at 22:36

## 2024-05-04 RX ADMIN — Medication 1: at 08:25

## 2024-05-04 RX ADMIN — Medication 4 MILLILITER(S): at 11:28

## 2024-05-04 RX ADMIN — Medication 50 MILLIGRAM(S): at 22:38

## 2024-05-04 RX ADMIN — Medication 40 MILLIEQUIVALENT(S): at 11:38

## 2024-05-04 RX ADMIN — CHOLESTYRAMINE 4 GRAM(S): 4 POWDER, FOR SUSPENSION ORAL at 11:39

## 2024-05-04 RX ADMIN — Medication 100 MILLIGRAM(S): at 06:50

## 2024-05-04 NOTE — PROGRESS NOTE ADULT - ASSESSMENT
74 yo female with PMHx of HTN, DM2, AS, CAD, PVD, HFpEF (55%) Afib on Coumadin s/p ablation 2023 presents to the ED BIBEMS for progressive shortness of breath and JETT admitted for acute hypoxemic respiratory failure due to influenza and acute exacerbation of CHF requiring Bipap. Stabilized off bipap on 02 , transferred to tele     # Acute hypoxic resp failure - multifactorial due to 1  # Decompensated HFpEF suspect due to noncompliance with diet  # Viral URI 2/2 + Influenza A   - tamiflu x 5 days   - monitor temps  - Blood cultures no growth  - diurese, currently of Lasix 40 daily  - echo preserved EF. moderate to severe AS  - Mucinex  - Chest PT  - Solumedrol BID, taper today     #a.fib with RVR due to the above  - BB increased to Toprol 50 BID, improved   - INR within goal. Discontinue Lovenox. Continue Warfarin     # Hypothyroidism  - TSH low but normal t4. Repeat outpatient     #Positive urine culture  Results noted. asymptomatic. No need to tx    DISPO: Pending clinical improvement, likely discharge in next 2-3 days    72 yo female with PMHx of HTN, DM2, AS, CAD, PVD, HFpEF (55%) Afib on Coumadin s/p ablation 2023 presents to the ED BIBEMS for progressive shortness of breath and JETT admitted for acute hypoxemic respiratory failure due to influenza and acute exacerbation of CHF requiring Bipap. Stabilized off bipap on 02 , transferred to tele     # Acute hypoxic resp failure - multifactorial due to 1  # Decompensated HFpEF suspect due to noncompliance with diet  # Viral URI 2/2 + Influenza A   - tamiflu x 5 days   - monitor temps  - Blood cultures no growth  - diurese, currently on Lasix 40 daily  - echo preserved EF. moderate to severe AS  - Mucinex  - Chest PT  - Solumedrol BID, taper today to daily    #a.fib with RVR due to the above  - BB increased to Toprol 50 BID, improved   - INR 3.12 today. Warfarin 3 tonight    # Hypothyroidism  - TSH low but normal t4. Repeat outpatient     #Positive urine culture  Results noted. asymptomatic. No need to tx    DISPO: Pending clinical improvement, likely discharge in next 1-2 days

## 2024-05-04 NOTE — PROGRESS NOTE ADULT - SUBJECTIVE AND OBJECTIVE BOX
incomplete note    HOSPITALIST PROGRESS NOTE    72 yo female with PMHx of HTN, DM2, AS, CAD, PVD, HFpEF (55%) Afib on Coumadin s/p ablation 2023 presents to the ED BIBEMS for progressive shortness of breath and JETT admitted for acute hypoxemic respiratory failure due to influenza and acute exacerbation of CHF requiring Bipap. Stabilized off bipap on 02 , transferred to tele     5/2: Patient seen and examined at bedside. Still SOB, symptoms slightly better. HR still elevated. Inhalers switched to Xopenex due to tachycardia.     5/3: Patient seen and examined at bedside. SOB improving. Overall feels less congested. HR improved. Complains of neuropathic LE pain - chronic. No further complaints.     5/4: Patient seen and examined at bedside.     ROS: All 10 systems reviewed and found to be negative with the exception of what has been described above.     VITAL SIGNS:  Vital Signs Last 24 Hrs  T(C): 36.4 (04 May 2024 08:30), Max: 36.9 (03 May 2024 09:49)  T(F): 97.5 (04 May 2024 08:30), Max: 98.4 (03 May 2024 09:49)  HR: 102 (04 May 2024 08:30) (87 - 124)  BP: 126/77 (04 May 2024 08:30) (99/80 - 143/98)  BP(mean): --  RR: 18 (04 May 2024 08:30) (18 - 20)  SpO2: 99% (04 May 2024 08:30) (94% - 100%)    Parameters below as of 04 May 2024 08:30  Patient On (Oxygen Delivery Method): nasal cannula  O2 Flow (L/min): 2    PHYSICAL EXAM:  General: comfortable, no acute distress  HEENT: Atraumatic, no LAD, trachea midline, PERRLA  Cardiovascular: normal s1s2, no murmurs, gallops or friction rubs  Pulmonary: + congestion / + wheezing  Gastrointestinal: soft non tender non distended, no masses felt, no organomegaly  Extremities: Chronic bilateral LE venous stasis changes   Neurological: CN II-12 intact. No focal weakness  Psychiatrical: normal mood, cooperative  SKIN: no rash, lesions or ulcers    MEDICATIONS:  MEDICATIONS  (STANDING):  acetylcysteine 20%  Inhalation 4 milliLiter(s) Inhalation every 12 hours  albuterol    0.083% 2.5 milliGRAM(s) Nebulizer every 6 hours  albuterol    90 MICROgram(s) HFA Inhaler 1 Puff(s) Inhalation every 4 hours  albuterol/ipratropium for Nebulization 3 milliLiter(s) Nebulizer every 6 hours  atorvastatin 10 milliGRAM(s) Oral at bedtime  chlorhexidine 4% Liquid 1 Application(s) Topical daily  cholestyramine Powder (Sugar-Free) 4 Gram(s) Oral daily  dextrose 10% Bolus 125 milliLiter(s) IV Bolus once  dextrose 5%. 1000 milliLiter(s) (50 mL/Hr) IV Continuous <Continuous>  dextrose 50% Injectable 25 Gram(s) IV Push once  famotidine    Tablet 20 milliGRAM(s) Oral daily  fluticasone propionate 50 MICROgram(s)/spray Nasal Spray 1 Spray(s) Both Nostrils two times a day  furosemide   Injectable 40 milliGRAM(s) IV Push daily  gabapentin 800 milliGRAM(s) Oral three times a day  glucagon  Injectable 1 milliGRAM(s) IntraMuscular once  guaiFENesin  milliGRAM(s) Oral every 12 hours  influenza  Vaccine (HIGH DOSE) 0.7 milliLiter(s) IntraMuscular once  insulin lispro (ADMELOG) corrective regimen sliding scale   SubCutaneous Before meals and at bedtime  levalbuterol Inhalation 1.25 milliGRAM(s) Inhalation every 6 hours  methylPREDNISolone sodium succinate Injectable 40 milliGRAM(s) IV Push every 12 hours  metoprolol succinate ER 50 milliGRAM(s) Oral every 12 hours  montelukast 10 milliGRAM(s) Oral at bedtime  oseltamivir 75 milliGRAM(s) Oral two times a day  potassium chloride    Tablet ER 40 milliEquivalent(s) Oral daily    MEDICATIONS  (PRN):  benzonatate 100 milliGRAM(s) Oral every 8 hours PRN Cough  dextrose Oral Gel 15 Gram(s) Oral once PRN Blood Glucose LESS THAN 70 milliGRAM(s)/deciliter  zolpidem 5 milliGRAM(s) Oral at bedtime PRN Insomnia      ALLERGIES:  Allergies    penicillin (Hives; Urticaria)  PC Pen VK (Rash)  latex (Unknown)  [This allergen will not trigger allergy alert] IV DYE, IODINE CONTRAST (Unknown)    Intolerances        LABS:    05-04    139  |  105  |  35<H>  ----------------------------<  185<H>  4.2   |  28  |  0.73    Ca    8.9      04 May 2024 08:07  Mg     2.2     05-04      PT/INR - ( 04 May 2024 08:07 )   PT: 34.1 sec;   INR: 3.12 ratio           Urinalysis Basic - ( 04 May 2024 08:07 )    Color: x / Appearance: x / SG: x / pH: x  Gluc: 185 mg/dL / Ketone: x  / Bili: x / Urobili: x   Blood: x / Protein: x / Nitrite: x   Leuk Esterase: x / RBC: x / WBC x   Sq Epi: x / Non Sq Epi: x / Bacteria: x      CAPILLARY BLOOD GLUCOSE      POCT Blood Glucose.: 185 mg/dL (04 May 2024 07:55)        RADIOLOGY & ADDITIONAL TESTS: Reviewed. HOSPITALIST PROGRESS NOTE    74 yo female with PMHx of HTN, DM2, AS, CAD, PVD, HFpEF (55%) Afib on Coumadin s/p ablation 2023 presents to the ED BIBEMS for progressive shortness of breath and JETT admitted for acute hypoxemic respiratory failure due to influenza and acute exacerbation of CHF requiring Bipap. Stabilized off bipap on 02 , transferred to tele     5/2: Patient seen and examined at bedside. Still SOB, symptoms slightly better. HR still elevated. Inhalers switched to Xopenex due to tachycardia.     5/3: Patient seen and examined at bedside. SOB improving. Overall feels less congested. HR improved. Complains of neuropathic LE pain - chronic. No further complaints.     5/4: Patient seen and examined at bedside. Still congested, but improving. Respiratory for chest PT. Weaned steroids today.     ROS: All 10 systems reviewed and found to be negative with the exception of what has been described above.     VITAL SIGNS:  Vital Signs Last 24 Hrs  T(C): 36.4 (04 May 2024 08:30), Max: 36.9 (03 May 2024 09:49)  T(F): 97.5 (04 May 2024 08:30), Max: 98.4 (03 May 2024 09:49)  HR: 102 (04 May 2024 08:30) (87 - 124)  BP: 126/77 (04 May 2024 08:30) (99/80 - 143/98)  BP(mean): --  RR: 18 (04 May 2024 08:30) (18 - 20)  SpO2: 99% (04 May 2024 08:30) (94% - 100%)    Parameters below as of 04 May 2024 08:30  Patient On (Oxygen Delivery Method): nasal cannula  O2 Flow (L/min): 2    PHYSICAL EXAM:  General: comfortable, no acute distress  HEENT: Atraumatic, no LAD, trachea midline, PERRLA  Cardiovascular: normal s1s2, no murmurs, gallops or friction rubs  Pulmonary: + congestion / + wheezing  Gastrointestinal: soft non tender non distended, no masses felt, no organomegaly  Extremities: Chronic bilateral LE venous stasis changes   Neurological: CN II-12 intact. No focal weakness  Psychiatrical: normal mood, cooperative  SKIN: no rash, lesions or ulcers    MEDICATIONS:  MEDICATIONS  (STANDING):  acetylcysteine 20%  Inhalation 4 milliLiter(s) Inhalation every 12 hours  albuterol    0.083% 2.5 milliGRAM(s) Nebulizer every 6 hours  albuterol    90 MICROgram(s) HFA Inhaler 1 Puff(s) Inhalation every 4 hours  albuterol/ipratropium for Nebulization 3 milliLiter(s) Nebulizer every 6 hours  atorvastatin 10 milliGRAM(s) Oral at bedtime  chlorhexidine 4% Liquid 1 Application(s) Topical daily  cholestyramine Powder (Sugar-Free) 4 Gram(s) Oral daily  dextrose 10% Bolus 125 milliLiter(s) IV Bolus once  dextrose 5%. 1000 milliLiter(s) (50 mL/Hr) IV Continuous <Continuous>  dextrose 50% Injectable 25 Gram(s) IV Push once  famotidine    Tablet 20 milliGRAM(s) Oral daily  fluticasone propionate 50 MICROgram(s)/spray Nasal Spray 1 Spray(s) Both Nostrils two times a day  furosemide   Injectable 40 milliGRAM(s) IV Push daily  gabapentin 800 milliGRAM(s) Oral three times a day  glucagon  Injectable 1 milliGRAM(s) IntraMuscular once  guaiFENesin  milliGRAM(s) Oral every 12 hours  influenza  Vaccine (HIGH DOSE) 0.7 milliLiter(s) IntraMuscular once  insulin lispro (ADMELOG) corrective regimen sliding scale   SubCutaneous Before meals and at bedtime  levalbuterol Inhalation 1.25 milliGRAM(s) Inhalation every 6 hours  methylPREDNISolone sodium succinate Injectable 40 milliGRAM(s) IV Push every 12 hours  metoprolol succinate ER 50 milliGRAM(s) Oral every 12 hours  montelukast 10 milliGRAM(s) Oral at bedtime  oseltamivir 75 milliGRAM(s) Oral two times a day  potassium chloride    Tablet ER 40 milliEquivalent(s) Oral daily    MEDICATIONS  (PRN):  benzonatate 100 milliGRAM(s) Oral every 8 hours PRN Cough  dextrose Oral Gel 15 Gram(s) Oral once PRN Blood Glucose LESS THAN 70 milliGRAM(s)/deciliter  zolpidem 5 milliGRAM(s) Oral at bedtime PRN Insomnia      ALLERGIES:  Allergies    penicillin (Hives; Urticaria)  PC Pen VK (Rash)  latex (Unknown)  [This allergen will not trigger allergy alert] IV DYE, IODINE CONTRAST (Unknown)    Intolerances        LABS:    05-04    139  |  105  |  35<H>  ----------------------------<  185<H>  4.2   |  28  |  0.73    Ca    8.9      04 May 2024 08:07  Mg     2.2     05-04      PT/INR - ( 04 May 2024 08:07 )   PT: 34.1 sec;   INR: 3.12 ratio           Urinalysis Basic - ( 04 May 2024 08:07 )    Color: x / Appearance: x / SG: x / pH: x  Gluc: 185 mg/dL / Ketone: x  / Bili: x / Urobili: x   Blood: x / Protein: x / Nitrite: x   Leuk Esterase: x / RBC: x / WBC x   Sq Epi: x / Non Sq Epi: x / Bacteria: x      CAPILLARY BLOOD GLUCOSE      POCT Blood Glucose.: 185 mg/dL (04 May 2024 07:55)        RADIOLOGY & ADDITIONAL TESTS: Reviewed.

## 2024-05-05 LAB
CULTURE RESULTS: SIGNIFICANT CHANGE UP
CULTURE RESULTS: SIGNIFICANT CHANGE UP
GLUCOSE BLDC GLUCOMTR-MCNC: 121 MG/DL — HIGH (ref 70–99)
GLUCOSE BLDC GLUCOMTR-MCNC: 133 MG/DL — HIGH (ref 70–99)
GLUCOSE BLDC GLUCOMTR-MCNC: 316 MG/DL — HIGH (ref 70–99)
GLUCOSE BLDC GLUCOMTR-MCNC: 329 MG/DL — HIGH (ref 70–99)
INR BLD: 4.28 RATIO — HIGH (ref 0.85–1.18)
PROTHROM AB SERPL-ACNC: 46.4 SEC — HIGH (ref 9.5–13)
SPECIMEN SOURCE: SIGNIFICANT CHANGE UP
SPECIMEN SOURCE: SIGNIFICANT CHANGE UP

## 2024-05-05 PROCEDURE — 99232 SBSQ HOSP IP/OBS MODERATE 35: CPT

## 2024-05-05 PROCEDURE — 71045 X-RAY EXAM CHEST 1 VIEW: CPT | Mod: 26

## 2024-05-05 PROCEDURE — 93010 ELECTROCARDIOGRAM REPORT: CPT

## 2024-05-05 RX ORDER — ACETYLCYSTEINE 200 MG/ML
4 VIAL (ML) MISCELLANEOUS EVERY 6 HOURS
Refills: 0 | Status: DISCONTINUED | OUTPATIENT
Start: 2024-05-05 | End: 2024-05-07

## 2024-05-05 RX ORDER — ACETAMINOPHEN 500 MG
1000 TABLET ORAL ONCE
Refills: 0 | Status: COMPLETED | OUTPATIENT
Start: 2024-05-05 | End: 2024-05-05

## 2024-05-05 RX ORDER — IPRATROPIUM BROMIDE 0.2 MG/ML
500 SOLUTION, NON-ORAL INHALATION EVERY 6 HOURS
Refills: 0 | Status: DISCONTINUED | OUTPATIENT
Start: 2024-05-05 | End: 2024-05-07

## 2024-05-05 RX ADMIN — Medication 4: at 17:54

## 2024-05-05 RX ADMIN — Medication 50 MILLIGRAM(S): at 11:38

## 2024-05-05 RX ADMIN — ATORVASTATIN CALCIUM 10 MILLIGRAM(S): 80 TABLET, FILM COATED ORAL at 22:20

## 2024-05-05 RX ADMIN — Medication 40 MILLIGRAM(S): at 11:36

## 2024-05-05 RX ADMIN — Medication 40 MILLIEQUIVALENT(S): at 11:42

## 2024-05-05 RX ADMIN — Medication 600 MILLIGRAM(S): at 11:37

## 2024-05-05 RX ADMIN — ZOLPIDEM TARTRATE 5 MILLIGRAM(S): 10 TABLET ORAL at 22:28

## 2024-05-05 RX ADMIN — MONTELUKAST 10 MILLIGRAM(S): 4 TABLET, CHEWABLE ORAL at 22:20

## 2024-05-05 RX ADMIN — LEVALBUTEROL 1.25 MILLIGRAM(S): 1.25 SOLUTION, CONCENTRATE RESPIRATORY (INHALATION) at 21:56

## 2024-05-05 RX ADMIN — LEVALBUTEROL 1.25 MILLIGRAM(S): 1.25 SOLUTION, CONCENTRATE RESPIRATORY (INHALATION) at 07:00

## 2024-05-05 RX ADMIN — Medication 1 SPRAY(S): at 11:41

## 2024-05-05 RX ADMIN — Medication 1 SPRAY(S): at 22:29

## 2024-05-05 RX ADMIN — GABAPENTIN 800 MILLIGRAM(S): 400 CAPSULE ORAL at 05:35

## 2024-05-05 RX ADMIN — Medication 50 MILLIGRAM(S): at 22:20

## 2024-05-05 RX ADMIN — Medication 40 MILLIGRAM(S): at 11:37

## 2024-05-05 RX ADMIN — Medication 600 MILLIGRAM(S): at 22:20

## 2024-05-05 RX ADMIN — Medication 100 MILLIGRAM(S): at 07:34

## 2024-05-05 RX ADMIN — LEVALBUTEROL 1.25 MILLIGRAM(S): 1.25 SOLUTION, CONCENTRATE RESPIRATORY (INHALATION) at 13:11

## 2024-05-05 RX ADMIN — Medication 4 MILLILITER(S): at 13:11

## 2024-05-05 RX ADMIN — LEVALBUTEROL 1.25 MILLIGRAM(S): 1.25 SOLUTION, CONCENTRATE RESPIRATORY (INHALATION) at 02:22

## 2024-05-05 RX ADMIN — GABAPENTIN 800 MILLIGRAM(S): 400 CAPSULE ORAL at 22:20

## 2024-05-05 RX ADMIN — Medication 400 MILLIGRAM(S): at 22:40

## 2024-05-05 RX ADMIN — GABAPENTIN 800 MILLIGRAM(S): 400 CAPSULE ORAL at 17:14

## 2024-05-05 RX ADMIN — FAMOTIDINE 20 MILLIGRAM(S): 10 INJECTION INTRAVENOUS at 11:37

## 2024-05-05 RX ADMIN — Medication 4: at 22:40

## 2024-05-05 NOTE — PROGRESS NOTE ADULT - SUBJECTIVE AND OBJECTIVE BOX
Subjective:    pat better, still very congested, sitting in bed, abd pain with constipation.    Home Medications:  Acidophilus oral tablet: 1 tab(s) orally once a day (29 Apr 2024 22:44)  cholestyramine 4 g/5 g oral powder for reconstitution: 1 packet(s) orally once a day (in the morning) (29 Apr 2024 22:42)  ezetimibe 10 mg oral tablet: 1 tab(s) orally once a day (in the evening) (29 Apr 2024 22:43)  gabapentin 800 mg oral tablet: 1 tab(s) orally 3 times a day (29 Apr 2024 22:44)  losartan 25 mg oral tablet: 1 tab(s) orally once a day (29 Apr 2024 22:43)  metFORMIN 1000 mg oral tablet: 1 tab(s) orally 2 times a day (29 Apr 2024 22:43)  metoprolol succinate 50 mg oral tablet, extended release: 1 tab(s) orally once a day (29 Apr 2024 22:43)  montelukast 10 mg oral tablet: 1 tab(s) orally once a day (at bedtime) (29 Apr 2024 22:43)  pravastatin 10 mg oral tablet: 1 tab(s) orally once a day (in the evening) (29 Apr 2024 22:43)  predniSONE 5 mg oral tablet: 1 tab(s) orally once a day (29 Apr 2024 22:43)  Vitamin D3 25 mcg (1000 intl units) oral capsule: 1 cap(s) orally once a day (29 Apr 2024 22:44)  warfarin 5 mg oral tablet: 1 tab(s) orally once a day (at bedtime) (29 Apr 2024 22:44)  zolpidem 10 mg oral tablet: 1 tab(s) orally once a day (at bedtime) (29 Apr 2024 22:43)    MEDICATIONS  (STANDING):  acetylcysteine 20%  Inhalation 4 milliLiter(s) Inhalation every 12 hours  albuterol    0.083% 2.5 milliGRAM(s) Nebulizer every 6 hours  albuterol    90 MICROgram(s) HFA Inhaler 1 Puff(s) Inhalation every 4 hours  albuterol/ipratropium for Nebulization 3 milliLiter(s) Nebulizer every 6 hours  atorvastatin 10 milliGRAM(s) Oral at bedtime  chlorhexidine 4% Liquid 1 Application(s) Topical daily  cholestyramine Powder (Sugar-Free) 4 Gram(s) Oral daily  dextrose 10% Bolus 125 milliLiter(s) IV Bolus once  dextrose 5%. 1000 milliLiter(s) (50 mL/Hr) IV Continuous <Continuous>  dextrose 50% Injectable 25 Gram(s) IV Push once  famotidine    Tablet 20 milliGRAM(s) Oral daily  fluticasone propionate 50 MICROgram(s)/spray Nasal Spray 1 Spray(s) Both Nostrils two times a day  furosemide   Injectable 40 milliGRAM(s) IV Push daily  gabapentin 800 milliGRAM(s) Oral three times a day  glucagon  Injectable 1 milliGRAM(s) IntraMuscular once  guaiFENesin  milliGRAM(s) Oral every 12 hours  influenza  Vaccine (HIGH DOSE) 0.7 milliLiter(s) IntraMuscular once  insulin lispro (ADMELOG) corrective regimen sliding scale   SubCutaneous Before meals and at bedtime  levalbuterol Inhalation 1.25 milliGRAM(s) Inhalation every 6 hours  methylPREDNISolone sodium succinate Injectable 40 milliGRAM(s) IV Push daily  metoprolol succinate ER 50 milliGRAM(s) Oral every 12 hours  montelukast 10 milliGRAM(s) Oral at bedtime  potassium chloride    Tablet ER 40 milliEquivalent(s) Oral daily    MEDICATIONS  (PRN):  benzonatate 100 milliGRAM(s) Oral every 8 hours PRN Cough  dextrose Oral Gel 15 Gram(s) Oral once PRN Blood Glucose LESS THAN 70 milliGRAM(s)/deciliter  ipratropium    for Nebulization 500 MICROGram(s) Nebulizer every 6 hours PRN Shortness of Breath and/or Wheezing  zolpidem 5 milliGRAM(s) Oral at bedtime PRN Insomnia      Allergies    penicillin (Hives; Urticaria)  PC Pen VK (Rash)  latex (Unknown)  [This allergen will not trigger allergy alert] IV DYE, IODINE CONTRAST (Unknown)    Intolerances        Vital Signs Last 24 Hrs  T(C): 36.6 (05 May 2024 09:23), Max: 36.6 (05 May 2024 09:23)  T(F): 97.9 (05 May 2024 09:23), Max: 97.9 (05 May 2024 09:23)  HR: 79 (05 May 2024 09:23) (79 - 114)  BP: 110/49 (05 May 2024 09:23) (110/49 - 126/76)  BP(mean): --  RR: 18 (05 May 2024 09:23) (18 - 18)  SpO2: 97% (05 May 2024 09:23) (92% - 97%)    Parameters below as of 05 May 2024 09:23  Patient On (Oxygen Delivery Method): room air          PHYSICAL EXAMINATION:    NECK:  Supple. No lymphadenopathy. Jugular venous pressure not elevated. Carotids equal.   HEART:   The cardiac impulse has a normal quality. Reg., Nl S1 and S2.  There are no murmurs, rubs or gallops noted  CHEST:  Chest crackles to auscultation. Normal respiratory effort.  ABDOMEN:  Soft and nontender.   EXTREMITIES:  There is no edema.       LABS:    05-04    139  |  105  |  35<H>  ----------------------------<  185<H>  4.2   |  28  |  0.73    Ca    8.9      04 May 2024 08:07  Mg     2.2     05-04      PT/INR - ( 04 May 2024 08:07 )   PT: 34.1 sec;   INR: 3.12 ratio           Urinalysis Basic - ( 04 May 2024 08:07 )    Color: x / Appearance: x / SG: x / pH: x  Gluc: 185 mg/dL / Ketone: x  / Bili: x / Urobili: x   Blood: x / Protein: x / Nitrite: x   Leuk Esterase: x / RBC: x / WBC x   Sq Epi: x / Non Sq Epi: x / Bacteria: x

## 2024-05-05 NOTE — PROGRESS NOTE ADULT - SUBJECTIVE AND OBJECTIVE BOX
HOSPITALIST PROGRESS NOTE    Admission HPI: 72 yo female with PMHx of HTN, DM2, AS, CAD, PVD, HFpEF (55%) Afib on Coumadin s/p ablation 2023 presents to the ED BIBEMS for progressive shortness of breath and JETT admitted for acute hypoxemic respiratory failure due to influenza and acute exacerbation of CHF requiring Bipap. Stabilized off bipap on 02 , transferred to tele    SUBJECTIVE / INTERVAL HPI: Patient seen and examined at bedside.     ROS: All 10 systems reviewed and found to be negative with the exception of what has been described above.     VITAL SIGNS:  Vital Signs Last 24 Hrs  T(C): 36.6 (05 May 2024 09:23), Max: 36.6 (05 May 2024 09:23)  T(F): 97.9 (05 May 2024 09:23), Max: 97.9 (05 May 2024 09:23)  HR: 79 (05 May 2024 09:23) (79 - 114)  BP: 110/49 (05 May 2024 09:23) (110/49 - 126/76)  BP(mean): --  RR: 18 (05 May 2024 09:23) (18 - 18)  SpO2: 97% (05 May 2024 09:23) (92% - 98%)    Parameters below as of 05 May 2024 09:23  Patient On (Oxygen Delivery Method): room air    PHYSICAL EXAM:  General: comfortable, no acute distress  HEENT: Atraumatic, no LAD, trachea midline, PERRLA  Cardiovascular: normal s1s2, no murmurs, gallops or friction rubs  Pulmonary: + congestion / + wheezing  Gastrointestinal: soft non tender non distended, no masses felt, no organomegaly  Extremities: Chronic bilateral LE venous stasis changes   Neurological: CN II-12 intact. No focal weakness  Psychiatrical: normal mood, cooperative  SKIN: no rash, lesions or ulcers    MEDICATIONS:  MEDICATIONS  (STANDING):  acetylcysteine 20%  Inhalation 4 milliLiter(s) Inhalation every 12 hours  albuterol    0.083% 2.5 milliGRAM(s) Nebulizer every 6 hours  albuterol    90 MICROgram(s) HFA Inhaler 1 Puff(s) Inhalation every 4 hours  albuterol/ipratropium for Nebulization 3 milliLiter(s) Nebulizer every 6 hours  atorvastatin 10 milliGRAM(s) Oral at bedtime  chlorhexidine 4% Liquid 1 Application(s) Topical daily  cholestyramine Powder (Sugar-Free) 4 Gram(s) Oral daily  dextrose 10% Bolus 125 milliLiter(s) IV Bolus once  dextrose 5%. 1000 milliLiter(s) (50 mL/Hr) IV Continuous <Continuous>  dextrose 50% Injectable 25 Gram(s) IV Push once  famotidine    Tablet 20 milliGRAM(s) Oral daily  fluticasone propionate 50 MICROgram(s)/spray Nasal Spray 1 Spray(s) Both Nostrils two times a day  furosemide   Injectable 40 milliGRAM(s) IV Push daily  gabapentin 800 milliGRAM(s) Oral three times a day  glucagon  Injectable 1 milliGRAM(s) IntraMuscular once  guaiFENesin  milliGRAM(s) Oral every 12 hours  influenza  Vaccine (HIGH DOSE) 0.7 milliLiter(s) IntraMuscular once  insulin lispro (ADMELOG) corrective regimen sliding scale   SubCutaneous Before meals and at bedtime  levalbuterol Inhalation 1.25 milliGRAM(s) Inhalation every 6 hours  methylPREDNISolone sodium succinate Injectable 40 milliGRAM(s) IV Push daily  metoprolol succinate ER 50 milliGRAM(s) Oral every 12 hours  montelukast 10 milliGRAM(s) Oral at bedtime  potassium chloride    Tablet ER 40 milliEquivalent(s) Oral daily    MEDICATIONS  (PRN):  benzonatate 100 milliGRAM(s) Oral every 8 hours PRN Cough  dextrose Oral Gel 15 Gram(s) Oral once PRN Blood Glucose LESS THAN 70 milliGRAM(s)/deciliter  ipratropium    for Nebulization 500 MICROGram(s) Nebulizer every 6 hours PRN Shortness of Breath and/or Wheezing  zolpidem 5 milliGRAM(s) Oral at bedtime PRN Insomnia      ALLERGIES:  Allergies    penicillin (Hives; Urticaria)  PC Pen VK (Rash)  latex (Unknown)  [This allergen will not trigger allergy alert] IV DYE, IODINE CONTRAST (Unknown)    Intolerances        LABS:    05-04    139  |  105  |  35<H>  ----------------------------<  185<H>  4.2   |  28  |  0.73    Ca    8.9      04 May 2024 08:07  Mg     2.2     05-04      PT/INR - ( 04 May 2024 08:07 )   PT: 34.1 sec;   INR: 3.12 ratio           Urinalysis Basic - ( 04 May 2024 08:07 )    Color: x / Appearance: x / SG: x / pH: x  Gluc: 185 mg/dL / Ketone: x  / Bili: x / Urobili: x   Blood: x / Protein: x / Nitrite: x   Leuk Esterase: x / RBC: x / WBC x   Sq Epi: x / Non Sq Epi: x / Bacteria: x      CAPILLARY BLOOD GLUCOSE      POCT Blood Glucose.: 121 mg/dL (05 May 2024 07:36)        RADIOLOGY & ADDITIONAL TESTS: Reviewed. HOSPITALIST PROGRESS NOTE    72 yo female with PMHx of HTN, DM2, AS, CAD, PVD, HFpEF (55%) Afib on Coumadin s/p ablation 2023 presents to the ED BIBEMS for progressive shortness of breath and JETT admitted for acute hypoxemic respiratory failure due to influenza and acute exacerbation of CHF requiring Bipap. Stabilized off bipap on 02 , transferred to tele     5/2: Patient seen and examined at bedside. Still SOB, symptoms slightly better. HR still elevated. Inhalers switched to Xopenex due to tachycardia.     5/3: Patient seen and examined at bedside. SOB improving. Overall feels less congested. HR improved. Complains of neuropathic LE pain - chronic. No further complaints.     5/4: Patient seen and examined at bedside. Still congested, but improving. Respiratory for chest PT. Weaned steroids today.     5/5: Patient seen and examined at bedside. Still very congested today, minimal improvement. change neb with mucomyst to q6h. Constipated earlier but had bowel movement this afternoon.     ROS: All 10 systems reviewed and found to be negative with the exception of what has been described above.     VITAL SIGNS:  Vital Signs Last 24 Hrs  T(C): 36.6 (05 May 2024 09:23), Max: 36.6 (05 May 2024 09:23)  T(F): 97.9 (05 May 2024 09:23), Max: 97.9 (05 May 2024 09:23)  HR: 79 (05 May 2024 09:23) (79 - 114)  BP: 110/49 (05 May 2024 09:23) (110/49 - 126/76)  BP(mean): --  RR: 18 (05 May 2024 09:23) (18 - 18)  SpO2: 97% (05 May 2024 09:23) (92% - 98%)    Parameters below as of 05 May 2024 09:23  Patient On (Oxygen Delivery Method): room air    PHYSICAL EXAM:  General: comfortable, no acute distress  HEENT: Atraumatic, no LAD, trachea midline, PERRLA  Cardiovascular: normal s1s2, no murmurs, gallops or friction rubs  Pulmonary: + congestion / + wheezing  Gastrointestinal: soft non tender non distended, no masses felt, no organomegaly  Extremities: Chronic bilateral LE venous stasis changes   Neurological: CN II-12 intact. No focal weakness  Psychiatrical: normal mood, cooperative  SKIN: no rash, lesions or ulcers    MEDICATIONS:  MEDICATIONS  (STANDING):  acetylcysteine 20%  Inhalation 4 milliLiter(s) Inhalation every 12 hours  albuterol    0.083% 2.5 milliGRAM(s) Nebulizer every 6 hours  albuterol    90 MICROgram(s) HFA Inhaler 1 Puff(s) Inhalation every 4 hours  albuterol/ipratropium for Nebulization 3 milliLiter(s) Nebulizer every 6 hours  atorvastatin 10 milliGRAM(s) Oral at bedtime  chlorhexidine 4% Liquid 1 Application(s) Topical daily  cholestyramine Powder (Sugar-Free) 4 Gram(s) Oral daily  dextrose 10% Bolus 125 milliLiter(s) IV Bolus once  dextrose 5%. 1000 milliLiter(s) (50 mL/Hr) IV Continuous <Continuous>  dextrose 50% Injectable 25 Gram(s) IV Push once  famotidine    Tablet 20 milliGRAM(s) Oral daily  fluticasone propionate 50 MICROgram(s)/spray Nasal Spray 1 Spray(s) Both Nostrils two times a day  furosemide   Injectable 40 milliGRAM(s) IV Push daily  gabapentin 800 milliGRAM(s) Oral three times a day  glucagon  Injectable 1 milliGRAM(s) IntraMuscular once  guaiFENesin  milliGRAM(s) Oral every 12 hours  influenza  Vaccine (HIGH DOSE) 0.7 milliLiter(s) IntraMuscular once  insulin lispro (ADMELOG) corrective regimen sliding scale   SubCutaneous Before meals and at bedtime  levalbuterol Inhalation 1.25 milliGRAM(s) Inhalation every 6 hours  methylPREDNISolone sodium succinate Injectable 40 milliGRAM(s) IV Push daily  metoprolol succinate ER 50 milliGRAM(s) Oral every 12 hours  montelukast 10 milliGRAM(s) Oral at bedtime  potassium chloride    Tablet ER 40 milliEquivalent(s) Oral daily    MEDICATIONS  (PRN):  benzonatate 100 milliGRAM(s) Oral every 8 hours PRN Cough  dextrose Oral Gel 15 Gram(s) Oral once PRN Blood Glucose LESS THAN 70 milliGRAM(s)/deciliter  ipratropium    for Nebulization 500 MICROGram(s) Nebulizer every 6 hours PRN Shortness of Breath and/or Wheezing  zolpidem 5 milliGRAM(s) Oral at bedtime PRN Insomnia      ALLERGIES:  Allergies    penicillin (Hives; Urticaria)  PC Pen VK (Rash)  latex (Unknown)  [This allergen will not trigger allergy alert] IV DYE, IODINE CONTRAST (Unknown)    Intolerances        LABS:    05-04    139  |  105  |  35<H>  ----------------------------<  185<H>  4.2   |  28  |  0.73    Ca    8.9      04 May 2024 08:07  Mg     2.2     05-04      PT/INR - ( 04 May 2024 08:07 )   PT: 34.1 sec;   INR: 3.12 ratio           Urinalysis Basic - ( 04 May 2024 08:07 )    Color: x / Appearance: x / SG: x / pH: x  Gluc: 185 mg/dL / Ketone: x  / Bili: x / Urobili: x   Blood: x / Protein: x / Nitrite: x   Leuk Esterase: x / RBC: x / WBC x   Sq Epi: x / Non Sq Epi: x / Bacteria: x      CAPILLARY BLOOD GLUCOSE      POCT Blood Glucose.: 121 mg/dL (05 May 2024 07:36)        RADIOLOGY & ADDITIONAL TESTS: Reviewed.

## 2024-05-05 NOTE — PROGRESS NOTE ADULT - ASSESSMENT
72 yo female with PMHx of HTN, DM2, AS, CAD, PVD, HFpEF (55%) Afib on Coumadin s/p ablation 2023 presents to the ED BIBEMS for progressive shortness of breath and JETT admitted for acute hypoxemic respiratory failure due to influenza and acute exacerbation of CHF requiring Bipap. Stabilized off bipap on 02 , transferred to tele     # Acute hypoxic resp failure - multifactorial due to 1  # Decompensated HFpEF suspect due to noncompliance with diet  # Viral URI 2/2 + Influenza A   - tamiflu x 5 days   - monitor temps  - Blood cultures no growth  - diurese, currently on Lasix 40 daily  - echo preserved EF. moderate to severe AS  - Mucinex  - Chest PT  - Solumedrol BID, taper today to daily    #a.fib with RVR due to the above  - BB increased to Toprol 50 BID, improved   - INR 3.12 today. Warfarin 3 tonight    # Hypothyroidism  - TSH low but normal t4. Repeat outpatient     #Positive urine culture  Results noted. asymptomatic. No need to tx    DISPO: Pending clinical improvement, likely discharge tomorrow 72 yo female with PMHx of HTN, DM2, AS, CAD, PVD, HFpEF (55%) Afib on Coumadin s/p ablation 2023 presents to the ED BIBEMS for progressive shortness of breath and JETT admitted for acute hypoxemic respiratory failure due to influenza and acute exacerbation of CHF requiring Bipap. Stabilized off bipap on 02 , transferred to tele     # Acute hypoxic resp failure - multifactorial due to 1  # Decompensated HFpEF suspect due to noncompliance with diet  # Viral URI 2/2 + Influenza A   - tamiflu x 5 days   - monitor temps  - Blood cultures no growth  - diurese, currently on Lasix 40 daily  - echo preserved EF. moderate to severe AS  - Mucinex  - Chest PT  - Solumedrol daily, continue IV for now     #a.fib with RVR due to the above  - BB increased to Toprol 50 BID, improved   - INR 3.12 today. Awaiting INR today    # Hypothyroidism  - TSH low but normal t4. Repeat outpatient     #Positive urine culture  Results noted. asymptomatic. No need to tx    DISPO: Pending clinical improvement, likely discharge i 1-2 days  72 yo female with PMHx of HTN, DM2, AS, CAD, PVD, HFpEF (55%) Afib on Coumadin s/p ablation 2023 presents to the ED BIBEMS for progressive shortness of breath and JETT admitted for acute hypoxemic respiratory failure due to influenza and acute exacerbation of CHF requiring Bipap. Stabilized off bipap on 02 , transferred to tele     # Acute hypoxic resp failure - multifactorial due to 1  # Decompensated HFpEF suspect due to noncompliance with diet  # Viral URI 2/2 + Influenza A   - tamiflu x 5 days   - monitor temps  - Blood cultures no growth  - diurese, currently on Lasix 40 daily  - echo preserved EF. moderate to severe AS  - Mucinex  - Chest PT  - Solumedrol daily, continue IV for now     #a.fib with RVR due to the above  - BB increased to Toprol 50 BID, improved   - INR 3.12 today. Awaiting INR today    # Hypothyroidism  - TSH low but normal t4. Repeat outpatient     #Positive urine culture  Results noted. asymptomatic. No need to tx    DISPO: Pending clinical improvement, likely discharge in 1-2 days

## 2024-05-05 NOTE — PROGRESS NOTE ADULT - ASSESSMENT
Acute hypoxic resp failure - multifactorial   Decompensated HFpEF suspect due to noncompliance with diet  Viral URI 2/2 + Influenza A   A.fib with RVR due to the above  Hypothyroidism    PLAN:    pulmonary better  IV solumedrol 40mg q12  Neb with mucomyst-change to q6hr  Chest PT with nasal saline solution  Tamiflu x 5 days  Trend WBC  Monitor temps. f/u blood and urine cultures.   Diurese  Echo  Afib with RVR Lovenox 120mg bid to bridge warfarin 5mg qd.  DVT PROPHYLASIX

## 2024-05-06 LAB
ANION GAP SERPL CALC-SCNC: 3 MMOL/L — LOW (ref 5–17)
BUN SERPL-MCNC: 38 MG/DL — HIGH (ref 7–23)
CALCIUM SERPL-MCNC: 8.7 MG/DL — SIGNIFICANT CHANGE UP (ref 8.5–10.1)
CHLORIDE SERPL-SCNC: 104 MMOL/L — SIGNIFICANT CHANGE UP (ref 96–108)
CO2 SERPL-SCNC: 29 MMOL/L — SIGNIFICANT CHANGE UP (ref 22–31)
CREAT SERPL-MCNC: 0.73 MG/DL — SIGNIFICANT CHANGE UP (ref 0.5–1.3)
EGFR: 86 ML/MIN/1.73M2 — SIGNIFICANT CHANGE UP
GLUCOSE BLDC GLUCOMTR-MCNC: 104 MG/DL — HIGH (ref 70–99)
GLUCOSE BLDC GLUCOMTR-MCNC: 129 MG/DL — HIGH (ref 70–99)
GLUCOSE BLDC GLUCOMTR-MCNC: 187 MG/DL — HIGH (ref 70–99)
GLUCOSE BLDC GLUCOMTR-MCNC: 194 MG/DL — HIGH (ref 70–99)
GLUCOSE SERPL-MCNC: 112 MG/DL — HIGH (ref 70–99)
INR BLD: 3.96 RATIO — HIGH (ref 0.85–1.18)
POTASSIUM SERPL-MCNC: 4.5 MMOL/L — SIGNIFICANT CHANGE UP (ref 3.5–5.3)
POTASSIUM SERPL-SCNC: 4.5 MMOL/L — SIGNIFICANT CHANGE UP (ref 3.5–5.3)
PROTHROM AB SERPL-ACNC: 43 SEC — HIGH (ref 9.5–13)
SODIUM SERPL-SCNC: 136 MMOL/L — SIGNIFICANT CHANGE UP (ref 135–145)

## 2024-05-06 PROCEDURE — 99232 SBSQ HOSP IP/OBS MODERATE 35: CPT

## 2024-05-06 RX ORDER — ACETAMINOPHEN 500 MG
1000 TABLET ORAL ONCE
Refills: 0 | Status: COMPLETED | OUTPATIENT
Start: 2024-05-06 | End: 2024-05-06

## 2024-05-06 RX ORDER — PANTOPRAZOLE SODIUM 20 MG/1
40 TABLET, DELAYED RELEASE ORAL
Refills: 0 | Status: DISCONTINUED | OUTPATIENT
Start: 2024-05-06 | End: 2024-05-07

## 2024-05-06 RX ORDER — NYSTATIN CREAM 100000 [USP'U]/G
1 CREAM TOPICAL
Refills: 0 | Status: DISCONTINUED | OUTPATIENT
Start: 2024-05-06 | End: 2024-05-07

## 2024-05-06 RX ORDER — ALBUTEROL 90 UG/1
2 AEROSOL, METERED ORAL EVERY 6 HOURS
Refills: 0 | Status: DISCONTINUED | OUTPATIENT
Start: 2024-05-06 | End: 2024-05-07

## 2024-05-06 RX ADMIN — Medication 400 MILLIGRAM(S): at 23:54

## 2024-05-06 RX ADMIN — ZOLPIDEM TARTRATE 5 MILLIGRAM(S): 10 TABLET ORAL at 21:56

## 2024-05-06 RX ADMIN — Medication 4 MILLILITER(S): at 20:35

## 2024-05-06 RX ADMIN — Medication 1: at 14:39

## 2024-05-06 RX ADMIN — FAMOTIDINE 20 MILLIGRAM(S): 10 INJECTION INTRAVENOUS at 11:07

## 2024-05-06 RX ADMIN — Medication 3 MILLILITER(S): at 20:35

## 2024-05-06 RX ADMIN — Medication 600 MILLIGRAM(S): at 11:08

## 2024-05-06 RX ADMIN — ATORVASTATIN CALCIUM 10 MILLIGRAM(S): 80 TABLET, FILM COATED ORAL at 21:50

## 2024-05-06 RX ADMIN — MONTELUKAST 10 MILLIGRAM(S): 4 TABLET, CHEWABLE ORAL at 21:52

## 2024-05-06 RX ADMIN — NYSTATIN CREAM 1 APPLICATION(S): 100000 CREAM TOPICAL at 21:52

## 2024-05-06 RX ADMIN — Medication 40 MILLIEQUIVALENT(S): at 11:07

## 2024-05-06 RX ADMIN — Medication 1: at 18:39

## 2024-05-06 RX ADMIN — GABAPENTIN 800 MILLIGRAM(S): 400 CAPSULE ORAL at 16:05

## 2024-05-06 RX ADMIN — LEVALBUTEROL 1.25 MILLIGRAM(S): 1.25 SOLUTION, CONCENTRATE RESPIRATORY (INHALATION) at 01:55

## 2024-05-06 RX ADMIN — Medication 600 MILLIGRAM(S): at 21:51

## 2024-05-06 RX ADMIN — Medication 50 MILLIGRAM(S): at 11:07

## 2024-05-06 RX ADMIN — GABAPENTIN 800 MILLIGRAM(S): 400 CAPSULE ORAL at 06:05

## 2024-05-06 RX ADMIN — Medication 50 MILLIGRAM(S): at 21:51

## 2024-05-06 RX ADMIN — Medication 40 MILLIGRAM(S): at 11:07

## 2024-05-06 RX ADMIN — Medication 3 MILLILITER(S): at 14:42

## 2024-05-06 RX ADMIN — Medication 1 SPRAY(S): at 11:14

## 2024-05-06 RX ADMIN — Medication 3 MILLILITER(S): at 08:14

## 2024-05-06 RX ADMIN — Medication 1 SPRAY(S): at 21:50

## 2024-05-06 RX ADMIN — GABAPENTIN 800 MILLIGRAM(S): 400 CAPSULE ORAL at 21:51

## 2024-05-06 NOTE — DIETITIAN INITIAL EVALUATION ADULT - [id number]_GEN_A_CORE
I&O's Detail    05 May 2024 07:01  -  06 May 2024 07:00  --------------------------------------------------------  IN:  Total IN: 0 mL    OUT:    Voided (mL): 1300 mL  Total OUT: 1300 mL    Total NET: -1300 mL

## 2024-05-06 NOTE — DIETITIAN INITIAL EVALUATION ADULT - ORAL INTAKE PTA/DIET HISTORY
Pt lives at home w/ ; pt normally cooks/grocery shops w/o difficulty. Reports decreased appetite and consuming <50% of ENN x 3-4 days 2/2 persistent diarrhea, nausea, abd pain

## 2024-05-06 NOTE — DIETITIAN INITIAL EVALUATION ADULT - PERTINENT MEDS FT
MEDICATIONS  (STANDING):  acetylcysteine 20%  Inhalation 4 milliLiter(s) Inhalation every 6 hours  albuterol    0.083% 2.5 milliGRAM(s) Nebulizer every 6 hours  albuterol    90 MICROgram(s) HFA Inhaler 1 Puff(s) Inhalation every 4 hours  albuterol/ipratropium for Nebulization 3 milliLiter(s) Nebulizer every 6 hours  atorvastatin 10 milliGRAM(s) Oral at bedtime  chlorhexidine 4% Liquid 1 Application(s) Topical daily  cholestyramine Powder (Sugar-Free) 4 Gram(s) Oral daily  dextrose 10% Bolus 125 milliLiter(s) IV Bolus once  dextrose 5%. 1000 milliLiter(s) (50 mL/Hr) IV Continuous <Continuous>  dextrose 50% Injectable 25 Gram(s) IV Push once  famotidine    Tablet 20 milliGRAM(s) Oral daily  fluticasone propionate 50 MICROgram(s)/spray Nasal Spray 1 Spray(s) Both Nostrils two times a day  furosemide   Injectable 40 milliGRAM(s) IV Push daily  gabapentin 800 milliGRAM(s) Oral three times a day  glucagon  Injectable 1 milliGRAM(s) IntraMuscular once  guaiFENesin  milliGRAM(s) Oral every 12 hours  influenza  Vaccine (HIGH DOSE) 0.7 milliLiter(s) IntraMuscular once  insulin lispro (ADMELOG) corrective regimen sliding scale   SubCutaneous Before meals and at bedtime  levalbuterol Inhalation 1.25 milliGRAM(s) Inhalation every 6 hours  metoprolol succinate ER 50 milliGRAM(s) Oral every 12 hours  montelukast 10 milliGRAM(s) Oral at bedtime  nystatin Powder 1 Application(s) Topical two times a day  potassium chloride    Tablet ER 40 milliEquivalent(s) Oral daily    MEDICATIONS  (PRN):  benzonatate 100 milliGRAM(s) Oral every 8 hours PRN Cough  dextrose Oral Gel 15 Gram(s) Oral once PRN Blood Glucose LESS THAN 70 milliGRAM(s)/deciliter  ipratropium    for Nebulization 500 MICROGram(s) Nebulizer every 6 hours PRN Shortness of Breath and/or Wheezing  zolpidem 5 milliGRAM(s) Oral at bedtime PRN Insomnia

## 2024-05-06 NOTE — PROGRESS NOTE ADULT - ASSESSMENT
Acute hypoxic resp failure - multifactorial   Decompensated HFpEF suspect due to noncompliance with diet  Viral URI 2/2 + Influenza A   A.fib with RVR due to the above  Hypothyroidism    PLAN:    pulmonary better-OOb/PT  Taper IV solumedrol 40mg q12  Neb with mucomyst-change to q6hr  Chest PT with nasal saline solution  Tamiflu x 5 days  Trend WBC  Monitor temps. f/u blood and urine cultures.   Diurese  Echo  Afib with RVR Lovenox 120mg bid to bridge warfarin 5mg qd.  DVT PROPHYLASIX

## 2024-05-06 NOTE — PROGRESS NOTE ADULT - SUBJECTIVE AND OBJECTIVE BOX
Subjective:    pat better, sitting in bed, still feels congested.    Home Medications:  Acidophilus oral tablet: 1 tab(s) orally once a day (29 Apr 2024 22:44)  cholestyramine 4 g/5 g oral powder for reconstitution: 1 packet(s) orally once a day (in the morning) (29 Apr 2024 22:42)  ezetimibe 10 mg oral tablet: 1 tab(s) orally once a day (in the evening) (29 Apr 2024 22:43)  gabapentin 800 mg oral tablet: 1 tab(s) orally 3 times a day (29 Apr 2024 22:44)  losartan 25 mg oral tablet: 1 tab(s) orally once a day (29 Apr 2024 22:43)  metFORMIN 1000 mg oral tablet: 1 tab(s) orally 2 times a day (29 Apr 2024 22:43)  metoprolol succinate 50 mg oral tablet, extended release: 1 tab(s) orally once a day (29 Apr 2024 22:43)  montelukast 10 mg oral tablet: 1 tab(s) orally once a day (at bedtime) (29 Apr 2024 22:43)  pravastatin 10 mg oral tablet: 1 tab(s) orally once a day (in the evening) (29 Apr 2024 22:43)  predniSONE 5 mg oral tablet: 1 tab(s) orally once a day (29 Apr 2024 22:43)  Vitamin D3 25 mcg (1000 intl units) oral capsule: 1 cap(s) orally once a day (29 Apr 2024 22:44)  warfarin 5 mg oral tablet: 1 tab(s) orally once a day (at bedtime) (29 Apr 2024 22:44)  zolpidem 10 mg oral tablet: 1 tab(s) orally once a day (at bedtime) (29 Apr 2024 22:43)    MEDICATIONS  (STANDING):  acetylcysteine 20%  Inhalation 4 milliLiter(s) Inhalation every 6 hours  albuterol    0.083% 2.5 milliGRAM(s) Nebulizer every 6 hours  albuterol    90 MICROgram(s) HFA Inhaler 1 Puff(s) Inhalation every 4 hours  albuterol/ipratropium for Nebulization 3 milliLiter(s) Nebulizer every 6 hours  atorvastatin 10 milliGRAM(s) Oral at bedtime  chlorhexidine 4% Liquid 1 Application(s) Topical daily  cholestyramine Powder (Sugar-Free) 4 Gram(s) Oral daily  dextrose 10% Bolus 125 milliLiter(s) IV Bolus once  dextrose 5%. 1000 milliLiter(s) (50 mL/Hr) IV Continuous <Continuous>  dextrose 50% Injectable 25 Gram(s) IV Push once  famotidine    Tablet 20 milliGRAM(s) Oral daily  fluticasone propionate 50 MICROgram(s)/spray Nasal Spray 1 Spray(s) Both Nostrils two times a day  furosemide   Injectable 40 milliGRAM(s) IV Push daily  gabapentin 800 milliGRAM(s) Oral three times a day  glucagon  Injectable 1 milliGRAM(s) IntraMuscular once  guaiFENesin  milliGRAM(s) Oral every 12 hours  influenza  Vaccine (HIGH DOSE) 0.7 milliLiter(s) IntraMuscular once  insulin lispro (ADMELOG) corrective regimen sliding scale   SubCutaneous Before meals and at bedtime  levalbuterol Inhalation 1.25 milliGRAM(s) Inhalation every 6 hours  metoprolol succinate ER 50 milliGRAM(s) Oral every 12 hours  montelukast 10 milliGRAM(s) Oral at bedtime  nystatin Powder 1 Application(s) Topical two times a day  potassium chloride    Tablet ER 40 milliEquivalent(s) Oral daily    MEDICATIONS  (PRN):  benzonatate 100 milliGRAM(s) Oral every 8 hours PRN Cough  dextrose Oral Gel 15 Gram(s) Oral once PRN Blood Glucose LESS THAN 70 milliGRAM(s)/deciliter  ipratropium    for Nebulization 500 MICROGram(s) Nebulizer every 6 hours PRN Shortness of Breath and/or Wheezing  zolpidem 5 milliGRAM(s) Oral at bedtime PRN Insomnia      Allergies    penicillin (Hives; Urticaria)  PC Pen VK (Rash)  latex (Unknown)  [This allergen will not trigger allergy alert] IV DYE, IODINE CONTRAST (Unknown)    Intolerances        Vital Signs Last 24 Hrs  T(C): 36.7 (06 May 2024 08:42), Max: 36.9 (05 May 2024 20:57)  T(F): 98 (06 May 2024 08:42), Max: 98.5 (05 May 2024 20:57)  HR: 93 (06 May 2024 08:42) (70 - 97)  BP: 105/76 (06 May 2024 08:42) (105/76 - 113/67)  BP(mean): 84 (06 May 2024 08:42) (84 - 84)  RR: 18 (06 May 2024 08:42) (18 - 18)  SpO2: 98% (06 May 2024 08:42) (97% - 98%)    Parameters below as of 06 May 2024 08:42  Patient On (Oxygen Delivery Method): nasal cannula  O2 Flow (L/min): 2        PHYSICAL EXAMINATION:    NECK:  Supple. No lymphadenopathy. Jugular venous pressure not elevated. Carotids equal.   HEART:   The cardiac impulse has a normal quality. Reg., Nl S1 and S2.  There are no murmurs, rubs or gallops noted  CHEST:  Chest crackles to auscultation. Normal respiratory effort.  ABDOMEN:  Soft and nontender.   EXTREMITIES:  There is no edema.       LABS:    05-06    136  |  104  |  38<H>  ----------------------------<  112<H>  4.5   |  29  |  0.73    Ca    8.7      06 May 2024 08:30      PT/INR - ( 06 May 2024 08:30 )   PT: 43.0 sec;   INR: 3.96 ratio           Urinalysis Basic - ( 06 May 2024 08:30 )    Color: x / Appearance: x / SG: x / pH: x  Gluc: 112 mg/dL / Ketone: x  / Bili: x / Urobili: x   Blood: x / Protein: x / Nitrite: x   Leuk Esterase: x / RBC: x / WBC x   Sq Epi: x / Non Sq Epi: x / Bacteria: x

## 2024-05-06 NOTE — PROGRESS NOTE ADULT - SUBJECTIVE AND OBJECTIVE BOX
HOSPITALIST PROGRESS NOTE    72 yo female with PMHx of HTN, DM2, AS, CAD, PVD, HFpEF (55%) Afib on Coumadin s/p ablation 2023 presents to the ED BIBEMS for progressive shortness of breath and JETT admitted for acute hypoxemic respiratory failure due to influenza and acute exacerbation of CHF requiring Bipap. Stabilized off bipap on 02 , transferred to tele     5/2: Patient seen and examined at bedside. Still SOB, symptoms slightly better. HR still elevated. Inhalers switched to Xopenex due to tachycardia.     5/3: Patient seen and examined at bedside. SOB improving. Overall feels less congested. HR improved. Complains of neuropathic LE pain - chronic. No further complaints.     5/4: Patient seen and examined at bedside. Still congested, but improving. Respiratory for chest PT. Weaned steroids today.     5/5: Patient seen and examined at bedside. Still very congested today, minimal improvement. change neb with mucomyst to q6h. Constipated earlier but had bowel movement this afternoon.     5/6: Patient seen and examined at bedside. Overall improving. Will taper steroids and plan for d/c tomorrow.     ROS: All 10 systems reviewed and found to be negative with the exception of what has been described above.     VITAL SIGNS:  Vital Signs Last 24 Hrs  T(C): 36.7 (06 May 2024 16:39), Max: 36.9 (05 May 2024 20:57)  T(F): 98 (06 May 2024 16:39), Max: 98.5 (05 May 2024 20:57)  HR: 99 (06 May 2024 16:39) (76 - 99)  BP: 110/74 (06 May 2024 16:39) (105/76 - 113/67)  BP(mean): 84 (06 May 2024 08:42) (84 - 84)  RR: 18 (06 May 2024 16:39) (18 - 18)  SpO2: 98% (06 May 2024 16:39) (97% - 98%)    Parameters below as of 06 May 2024 16:39  Patient On (Oxygen Delivery Method): nasal cannula  O2 Flow (L/min): 2    PHYSICAL EXAM:  General: comfortable, no acute distress  HEENT: Atraumatic, no LAD, trachea midline, PERRLA  Cardiovascular: normal s1s2, no murmurs, gallops or friction rubs  Pulmonary: + congestion / + wheezing  Gastrointestinal: soft non tender non distended, no masses felt, no organomegaly  Extremities: Chronic bilateral LE venous stasis changes   Neurological: CN II-12 intact. No focal weakness  Psychiatrical: normal mood, cooperative  SKIN: no rash, lesions or ulcers    MEDICATIONS:  MEDICATIONS  (STANDING):  acetylcysteine 20%  Inhalation 4 milliLiter(s) Inhalation every 6 hours  albuterol    0.083% 2.5 milliGRAM(s) Nebulizer every 6 hours  albuterol    90 MICROgram(s) HFA Inhaler 1 Puff(s) Inhalation every 4 hours  albuterol/ipratropium for Nebulization 3 milliLiter(s) Nebulizer every 6 hours  atorvastatin 10 milliGRAM(s) Oral at bedtime  chlorhexidine 4% Liquid 1 Application(s) Topical daily  cholestyramine Powder (Sugar-Free) 4 Gram(s) Oral daily  dextrose 10% Bolus 125 milliLiter(s) IV Bolus once  dextrose 5%. 1000 milliLiter(s) (50 mL/Hr) IV Continuous <Continuous>  dextrose 50% Injectable 25 Gram(s) IV Push once  famotidine    Tablet 20 milliGRAM(s) Oral daily  fluticasone propionate 50 MICROgram(s)/spray Nasal Spray 1 Spray(s) Both Nostrils two times a day  furosemide   Injectable 40 milliGRAM(s) IV Push daily  gabapentin 800 milliGRAM(s) Oral three times a day  glucagon  Injectable 1 milliGRAM(s) IntraMuscular once  guaiFENesin  milliGRAM(s) Oral every 12 hours  influenza  Vaccine (HIGH DOSE) 0.7 milliLiter(s) IntraMuscular once  insulin lispro (ADMELOG) corrective regimen sliding scale   SubCutaneous Before meals and at bedtime  levalbuterol Inhalation 1.25 milliGRAM(s) Inhalation every 6 hours  metoprolol succinate ER 50 milliGRAM(s) Oral every 12 hours  montelukast 10 milliGRAM(s) Oral at bedtime  nystatin Powder 1 Application(s) Topical two times a day  potassium chloride    Tablet ER 40 milliEquivalent(s) Oral daily    MEDICATIONS  (PRN):  aluminum hydroxide/magnesium hydroxide/simethicone Suspension 30 milliLiter(s) Oral every 4 hours PRN Dyspepsia  benzonatate 100 milliGRAM(s) Oral every 8 hours PRN Cough  dextrose Oral Gel 15 Gram(s) Oral once PRN Blood Glucose LESS THAN 70 milliGRAM(s)/deciliter  ipratropium    for Nebulization 500 MICROGram(s) Nebulizer every 6 hours PRN Shortness of Breath and/or Wheezing  zolpidem 5 milliGRAM(s) Oral at bedtime PRN Insomnia      ALLERGIES:  Allergies    penicillin (Hives; Urticaria)  PC Pen VK (Rash)  latex (Unknown)  [This allergen will not trigger allergy alert] IV DYE, IODINE CONTRAST (Unknown)    Intolerances        LABS:    05-06    136  |  104  |  38<H>  ----------------------------<  112<H>  4.5   |  29  |  0.73    Ca    8.7      06 May 2024 08:30      PT/INR - ( 06 May 2024 08:30 )   PT: 43.0 sec;   INR: 3.96 ratio           Urinalysis Basic - ( 06 May 2024 08:30 )    Color: x / Appearance: x / SG: x / pH: x  Gluc: 112 mg/dL / Ketone: x  / Bili: x / Urobili: x   Blood: x / Protein: x / Nitrite: x   Leuk Esterase: x / RBC: x / WBC x   Sq Epi: x / Non Sq Epi: x / Bacteria: x      CAPILLARY BLOOD GLUCOSE      POCT Blood Glucose.: 194 mg/dL (06 May 2024 13:58)        RADIOLOGY & ADDITIONAL TESTS: Reviewed.

## 2024-05-06 NOTE — PROGRESS NOTE ADULT - ASSESSMENT
72 yo female with PMHx of HTN, DM2, AS, CAD, PVD, HFpEF (55%) Afib on Coumadin s/p ablation 2023 presents to the ED BIBEMS for progressive shortness of breath and JETT admitted for acute hypoxemic respiratory failure due to influenza and acute exacerbation of CHF requiring Bipap. Stabilized off bipap on 02 , transferred to tele     # Acute hypoxic resp failure - multifactorial due to 1  # Decompensated HFpEF suspect due to noncompliance with diet  # Viral URI 2/2 + Influenza A   - tamiflu x 5 days   - monitor temps  - Blood cultures no growth  - diurese, currently on Lasix 40 daily  - echo preserved EF. moderate to severe AS  - Mucinex  - Chest PT  - Solumedrol daily, transition to PO Prednisone tomorrow     #a.fib with RVR due to the above  - BB increased to Toprol 50 BID, improved   - INR 3.96 today. Hold Warfarin    # Hypothyroidism  - TSH low but normal t4. Repeat outpatient     #Positive urine culture  Results noted. asymptomatic. No need to tx    DISPO: Likely plan for d/c tomorrow on steroid taper

## 2024-05-06 NOTE — DIETITIAN INITIAL EVALUATION ADULT - OTHER INFO
72 yo female with PMHx of HTN, DM2, AS, CAD, PVD, HFpEF (55%) Afib on Coumadin s/p ablation 2023 presents to the ED BIBEMS for progressive shortness of breath and JETT admitted for acute hypoxemic respiratory failure due to influenza and acute exacerbation of CHF requiring Bipap. Stabilized off bipap on 02 , transferred to tele.  Admit for acute hypoxic resp failure, decompensated HFpEF suspect due to noncompliance with diet, Viral URI 2/2 + Influenza A     W/ T2DM - POCTs variable x 24 hrs, 2 elevated (104, 329, 316, 133) and hgb A1C of 5.8% on 4/30/24 - good glycemic control for age. Reports initially poor appetite when first admitted, but now w/ improved appetite x 2-3 days. Reports UBW of ~260# x 2 weeks ago; bed scale wt of 252# taken by RD on 5/6/24. Unintentional wt loss of 8# / 3.1% wt loss x 2 weeks ; severe & clinically significant. NFPE reveals no muscle/fat wasting - appears overweight/obese; edema could be masking losses, skewing appearance (+2 L/R leg edema). Does not meet criteria for PCM; however, is at HIGH RISK.  C/w therapeutic diet as tolerated 2/2 mod edema,  POCTs variable. Denies ALL ONS - w/ improved PO intake. RD provided verbal and written nutrition education on low sodium diet, importance of monitoring fluids, and weight management - pt is receptive. See below for other recommendations. 74 yo female with PMHx of HTN, DM2, AS, CAD, PVD, HFpEF (55%) Afib on Coumadin s/p ablation 2023 presents to the ED BIBEMS for progressive shortness of breath and JETT admitted for acute hypoxemic respiratory failure due to influenza and acute exacerbation of CHF requiring Bipap. Stabilized off bipap on 02 , transferred to tele.  Admit for acute hypoxic resp failure, decompensated HFpEF suspect due to noncompliance with diet, Viral URI 2/2 + Influenza A     W/ T2DM - POCTs variable x 24 hrs, 2 elevated (104, 329, 316, 133) and hgb A1C of 5.8% on 4/30/24 - good glycemic control for age. Reports initially poor appetite when first admitted, but now w/ improved appetite x 2-3 days. Reports UBW of ~260# x 2 weeks ago; bed scale wt of 252# taken by RD on 5/6/24. Unintentional wt loss of 8# / 3.1% wt loss x 2 weeks ; severe & clinically significant. NFPE reveals no muscle/fat wasting - appears overweight/obese; edema could be masking losses, skewing appearance (+2 L/R leg edema). Does not meet criteria for PCM; however, is at HIGH RISK.  C/w therapeutic diet as tolerated 2/2 mod edema, POCTs variable. Denies ALL ONS - w/ improved PO intake. RD provided verbal and written nutrition education on low sodium diet, importance of monitoring fluids, and weight management - pt is receptive. See below for other recommendations.

## 2024-05-06 NOTE — DIETITIAN INITIAL EVALUATION ADULT - ADD RECOMMEND
1) C/w therapeutic diet as tolerated  2) Encourage protein-rich foods, maximize food preferences  3) Monitor bowel movements, if no BM for >3 days, consider implementing bowel regimen.  4) MVI w/ minerals daily to ensure 100% RDA met  5) Please obtain daily weights; strict I&Os  6) Monitor lytes/ min and replete prn.  7) Confirm goals of care regarding nutrition support  RD will continue to monitor PO intake, labs, hydration, and wt prn.   1) C/w therapeutic diet as tolerated  2) Encourage protein-rich foods, maximize food preferences  3) Monitor bowel movements, if no BM for >3 days, consider implementing bowel regimen.  4) MVI w/ minerals daily to ensure 100% RDA met  5) Please obtain daily weights; strict I&Os  6) Monitor lytes/ min and replete prn.  7) Monitor and optimize BG levels between 140-180 mg/dL by medical management  8) Confirm goals of care regarding nutrition support  RD will continue to monitor PO intake, labs, hydration, and wt prn.

## 2024-05-06 NOTE — DIETITIAN INITIAL EVALUATION ADULT - PERTINENT LABORATORY DATA
05-06    136  |  104  |  38<H>  ----------------------------<  112<H>  4.5   |  29  |  0.73    Ca    8.7      06 May 2024 08:30    POCT Blood Glucose.: 104 mg/dL (05-06-24 @ 08:26)  A1C with Estimated Average Glucose Result: 5.8 % (04-30-24 @ 05:44)

## 2024-05-07 ENCOUNTER — TRANSCRIPTION ENCOUNTER (OUTPATIENT)
Age: 74
End: 2024-05-07

## 2024-05-07 VITALS
TEMPERATURE: 98 F | RESPIRATION RATE: 18 BRPM | HEART RATE: 120 BPM | DIASTOLIC BLOOD PRESSURE: 82 MMHG | OXYGEN SATURATION: 96 % | SYSTOLIC BLOOD PRESSURE: 109 MMHG

## 2024-05-07 LAB
ANION GAP SERPL CALC-SCNC: 3 MMOL/L — LOW (ref 5–17)
BUN SERPL-MCNC: 44 MG/DL — HIGH (ref 7–23)
CALCIUM SERPL-MCNC: 8.9 MG/DL — SIGNIFICANT CHANGE UP (ref 8.5–10.1)
CHLORIDE SERPL-SCNC: 103 MMOL/L — SIGNIFICANT CHANGE UP (ref 96–108)
CO2 SERPL-SCNC: 30 MMOL/L — SIGNIFICANT CHANGE UP (ref 22–31)
CREAT SERPL-MCNC: 0.81 MG/DL — SIGNIFICANT CHANGE UP (ref 0.5–1.3)
EGFR: 76 ML/MIN/1.73M2 — SIGNIFICANT CHANGE UP
GLUCOSE BLDC GLUCOMTR-MCNC: 182 MG/DL — HIGH (ref 70–99)
GLUCOSE BLDC GLUCOMTR-MCNC: 200 MG/DL — HIGH (ref 70–99)
GLUCOSE SERPL-MCNC: 119 MG/DL — HIGH (ref 70–99)
HCT VFR BLD CALC: 42.5 % — SIGNIFICANT CHANGE UP (ref 34.5–45)
HGB BLD-MCNC: 13.6 G/DL — SIGNIFICANT CHANGE UP (ref 11.5–15.5)
INR BLD: 2.74 RATIO — HIGH (ref 0.85–1.18)
MCHC RBC-ENTMCNC: 29.8 PG — SIGNIFICANT CHANGE UP (ref 27–34)
MCHC RBC-ENTMCNC: 32 GM/DL — SIGNIFICANT CHANGE UP (ref 32–36)
MCV RBC AUTO: 93 FL — SIGNIFICANT CHANGE UP (ref 80–100)
PLATELET # BLD AUTO: 402 K/UL — HIGH (ref 150–400)
POTASSIUM SERPL-MCNC: 4.1 MMOL/L — SIGNIFICANT CHANGE UP (ref 3.5–5.3)
POTASSIUM SERPL-SCNC: 4.1 MMOL/L — SIGNIFICANT CHANGE UP (ref 3.5–5.3)
PROTHROM AB SERPL-ACNC: 30.1 SEC — HIGH (ref 9.5–13)
RBC # BLD: 4.57 M/UL — SIGNIFICANT CHANGE UP (ref 3.8–5.2)
RBC # FLD: 13.2 % — SIGNIFICANT CHANGE UP (ref 10.3–14.5)
SODIUM SERPL-SCNC: 136 MMOL/L — SIGNIFICANT CHANGE UP (ref 135–145)
WBC # BLD: 10.12 K/UL — SIGNIFICANT CHANGE UP (ref 3.8–10.5)
WBC # FLD AUTO: 10.12 K/UL — SIGNIFICANT CHANGE UP (ref 3.8–10.5)

## 2024-05-07 PROCEDURE — 99239 HOSP IP/OBS DSCHRG MGMT >30: CPT

## 2024-05-07 RX ORDER — FUROSEMIDE 40 MG
1 TABLET ORAL
Qty: 30 | Refills: 0
Start: 2024-05-07 | End: 2024-06-05

## 2024-05-07 RX ORDER — FLUTICASONE PROPIONATE 50 MCG
1 SPRAY, SUSPENSION NASAL
Qty: 0 | Refills: 0 | DISCHARGE
Start: 2024-05-07

## 2024-05-07 RX ORDER — ALBUTEROL 90 UG/1
2 AEROSOL, METERED ORAL
Qty: 0 | Refills: 0 | DISCHARGE
Start: 2024-05-07

## 2024-05-07 RX ORDER — FAMOTIDINE 10 MG/ML
1 INJECTION INTRAVENOUS
Qty: 14 | Refills: 0
Start: 2024-05-07 | End: 2024-05-20

## 2024-05-07 RX ORDER — PANTOPRAZOLE SODIUM 20 MG/1
1 TABLET, DELAYED RELEASE ORAL
Qty: 14 | Refills: 0
Start: 2024-05-07 | End: 2024-05-20

## 2024-05-07 RX ADMIN — Medication 1 SPRAY(S): at 10:00

## 2024-05-07 RX ADMIN — Medication 500 MICROGRAM(S): at 09:28

## 2024-05-07 RX ADMIN — Medication 1: at 13:01

## 2024-05-07 RX ADMIN — NYSTATIN CREAM 1 APPLICATION(S): 100000 CREAM TOPICAL at 14:06

## 2024-05-07 RX ADMIN — FAMOTIDINE 20 MILLIGRAM(S): 10 INJECTION INTRAVENOUS at 09:49

## 2024-05-07 RX ADMIN — GABAPENTIN 800 MILLIGRAM(S): 400 CAPSULE ORAL at 05:56

## 2024-05-07 RX ADMIN — Medication 50 MILLIGRAM(S): at 09:49

## 2024-05-07 RX ADMIN — Medication 40 MILLIGRAM(S): at 09:49

## 2024-05-07 RX ADMIN — Medication 600 MILLIGRAM(S): at 09:49

## 2024-05-07 RX ADMIN — GABAPENTIN 800 MILLIGRAM(S): 400 CAPSULE ORAL at 14:05

## 2024-05-07 RX ADMIN — Medication 1: at 08:45

## 2024-05-07 RX ADMIN — PANTOPRAZOLE SODIUM 40 MILLIGRAM(S): 20 TABLET, DELAYED RELEASE ORAL at 05:58

## 2024-05-07 RX ADMIN — Medication 4 MILLILITER(S): at 02:26

## 2024-05-07 RX ADMIN — Medication 40 MILLIEQUIVALENT(S): at 13:01

## 2024-05-07 RX ADMIN — Medication 3 MILLILITER(S): at 02:26

## 2024-05-07 RX ADMIN — CHOLESTYRAMINE 4 GRAM(S): 4 POWDER, FOR SUSPENSION ORAL at 09:49

## 2024-05-07 NOTE — DISCHARGE NOTE PROVIDER - CARE PROVIDER_API CALL
Justo Mayer  Cardiovascular Disease  [address], [address]  [place] [address]  Phone: [phone]  Fax: [phone]  Scheduled Appointment: 05/15/2024 04:30 PM

## 2024-05-07 NOTE — DISCHARGE NOTE NURSING/CASE MANAGEMENT/SOCIAL WORK - PATIENT PORTAL LINK FT
You can access the FollowMyHealth Patient Portal offered by [facility] by registering at the following website: [email or web address]. By joining [employer]’s FollowMyHealth portal, you will also be able to view your health information using other applications (apps) compatible with our system.

## 2024-05-07 NOTE — DISCHARGE NOTE PROVIDER - NSDCCPCAREPLAN_GEN_ALL_CORE_FT
PRINCIPAL DISCHARGE DIAGNOSIS  Diagnosis: Influenza A  Assessment and Plan of Treatment: You completed treatment for the Influenza Virus. You also received steroids while in the hospital. Please continue steroid taper as as prescibed and continue supportive care at home.  5/8: Prednisone 40 mg once per day  5/9: Prednisone 30 mg once per day  5/10: Prednisone 20 mg once per day  5/11: Prednisone 10 mg once per day  5/12: Prednisone 5 mg once per day  Please take Pepcid/Protonix for stomach protection while on the steroids.   Please follow up with your PCP within 10 days of discharge.      SECONDARY DISCHARGE DIAGNOSES  Diagnosis: Atrial fibrillation with RVR  Assessment and Plan of Treatment: Please continue home medications and follow up with Dr. Mayer for further management. Please take your home Warfarin 5 mg tonight and continue INR checks upon discharge.    Diagnosis: Chronic diastolic heart failure  Assessment and Plan of Treatment: WHAT IS HEART FAILURE? Heart failure (HF) is a condition in which the heart cannot pump enough blood to meet the body’s needs. The weakening of the heart’s pumping ability results in the buildup of fluid in the feet, ankles and legs resulting in edema, tiredness, and shortness of breath.  THINGS TO DO: (1) Weigh yourself daily – keep a log for you cardiologist (2) Maintain a heart healthy diet and limit dietary sodium (3) Drink liquids as directed – you may need to limit the amount of liquid you drink to prevent fluid buildup (4) Maintain a healthy weight (5) Stay active with exercise  MONITOR THESE SIGNS AND SYMPTOMS: (1) Worsening shortness of breath at rest or at night (2) worsening leg swelling (3) Abdominal pain or swelling (4) Chest pain or palpitations (5) Weight gain of 5lbs or more in 1 week or 2-3lbs in 24hours. If you experience any of these, DO alert your primary care provider, or return to the Emergency Department if you feel very sick.   Please Lasix 20 mg daily and follow up with Dr. Mayer within 10 days of discharge.

## 2024-05-07 NOTE — DISCHARGE NOTE PROVIDER - NSDCCAREPROVSEEN_GEN_ALL_CORE_FT
Juan Ramon, Akhil Jimenez, Sivan Pisano, Levi Mayer, Justo Lindsey, Carmelita Patiño, [employer]

## 2024-05-07 NOTE — PROGRESS NOTE ADULT - SUBJECTIVE AND OBJECTIVE BOX
Subjective:    pat better, sitting in chair, refusing mucomyst, no respiratory distress.    Home Medications:  Acidophilus oral tablet: 1 tab(s) orally once a day (29 Apr 2024 22:44)  cholestyramine 4 g/5 g oral powder for reconstitution: 1 packet(s) orally once a day (in the morning) (29 Apr 2024 22:42)  ezetimibe 10 mg oral tablet: 1 tab(s) orally once a day (in the evening) (29 Apr 2024 22:43)  gabapentin 800 mg oral tablet: 1 tab(s) orally 3 times a day (29 Apr 2024 22:44)  losartan 25 mg oral tablet: 1 tab(s) orally once a day (29 Apr 2024 22:43)  metFORMIN 1000 mg oral tablet: 1 tab(s) orally 2 times a day (29 Apr 2024 22:43)  metoprolol succinate 50 mg oral tablet, extended release: 1 tab(s) orally once a day (29 Apr 2024 22:43)  montelukast 10 mg oral tablet: 1 tab(s) orally once a day (at bedtime) (29 Apr 2024 22:43)  pravastatin 10 mg oral tablet: 1 tab(s) orally once a day (in the evening) (29 Apr 2024 22:43)  predniSONE 5 mg oral tablet: 1 tab(s) orally once a day (29 Apr 2024 22:43)  Vitamin D3 25 mcg (1000 intl units) oral capsule: 1 cap(s) orally once a day (29 Apr 2024 22:44)  warfarin 5 mg oral tablet: 1 tab(s) orally once a day (at bedtime) (29 Apr 2024 22:44)  zolpidem 10 mg oral tablet: 1 tab(s) orally once a day (at bedtime) (29 Apr 2024 22:43)    MEDICATIONS  (STANDING):  albuterol/ipratropium for Nebulization 3 milliLiter(s) Nebulizer every 6 hours  atorvastatin 10 milliGRAM(s) Oral at bedtime  chlorhexidine 4% Liquid 1 Application(s) Topical daily  cholestyramine Powder (Sugar-Free) 4 Gram(s) Oral daily  dextrose 10% Bolus 125 milliLiter(s) IV Bolus once  dextrose 5%. 1000 milliLiter(s) (50 mL/Hr) IV Continuous <Continuous>  dextrose 50% Injectable 25 Gram(s) IV Push once  famotidine    Tablet 20 milliGRAM(s) Oral daily  fluticasone propionate 50 MICROgram(s)/spray Nasal Spray 1 Spray(s) Both Nostrils two times a day  furosemide   Injectable 40 milliGRAM(s) IV Push daily  gabapentin 800 milliGRAM(s) Oral three times a day  glucagon  Injectable 1 milliGRAM(s) IntraMuscular once  guaiFENesin  milliGRAM(s) Oral every 12 hours  influenza  Vaccine (HIGH DOSE) 0.7 milliLiter(s) IntraMuscular once  insulin lispro (ADMELOG) corrective regimen sliding scale   SubCutaneous Before meals and at bedtime  metoprolol succinate ER 50 milliGRAM(s) Oral every 12 hours  montelukast 10 milliGRAM(s) Oral at bedtime  nystatin Powder 1 Application(s) Topical two times a day  pantoprazole    Tablet 40 milliGRAM(s) Oral before breakfast  potassium chloride    Tablet ER 40 milliEquivalent(s) Oral daily  predniSONE   Tablet 40 milliGRAM(s) Oral daily    MEDICATIONS  (PRN):  albuterol    90 MICROgram(s) HFA Inhaler 2 Puff(s) Inhalation every 6 hours PRN Shortness of Breath and/or Wheezing  aluminum hydroxide/magnesium hydroxide/simethicone Suspension 30 milliLiter(s) Oral every 4 hours PRN Dyspepsia  benzonatate 100 milliGRAM(s) Oral every 8 hours PRN Cough  dextrose Oral Gel 15 Gram(s) Oral once PRN Blood Glucose LESS THAN 70 milliGRAM(s)/deciliter  ipratropium    for Nebulization 500 MICROGram(s) Nebulizer every 6 hours PRN Shortness of Breath and/or Wheezing  zolpidem 5 milliGRAM(s) Oral at bedtime PRN Insomnia      Allergies    penicillin (Hives; Urticaria)  PC Pen VK (Rash)  latex (Unknown)  [This allergen will not trigger allergy alert] IV DYE, IODINE CONTRAST (Unknown)    Intolerances        Vital Signs Last 24 Hrs  T(C): 36.3 (07 May 2024 08:13), Max: 37.1 (06 May 2024 21:10)  T(F): 97.3 (07 May 2024 08:13), Max: 98.7 (06 May 2024 21:10)  HR: 86 (07 May 2024 08:13) (57 - 115)  BP: 115/75 (07 May 2024 08:13) (102/66 - 115/75)  BP(mean): --  RR: 18 (07 May 2024 08:13) (18 - 18)  SpO2: 97% (07 May 2024 08:13) (96% - 98%)    Parameters below as of 07 May 2024 08:13  Patient On (Oxygen Delivery Method): nasal cannula  O2 Flow (L/min): 2        PHYSICAL EXAMINATION:    NECK:  Supple. No lymphadenopathy. Jugular venous pressure not elevated. Carotids equal.   HEART:   The cardiac impulse has a normal quality. Reg., Nl S1 and S2.  There are no murmurs, rubs or gallops noted  CHEST:  Chest crackles to auscultation. Normal respiratory effort.  ABDOMEN:  Soft and nontender.   EXTREMITIES:  There is no edema.       LABS:                        13.6   10.12 )-----------( 402      ( 07 May 2024 08:15 )             42.5     05-07    136  |  103  |  44<H>  ----------------------------<  119<H>  4.1   |  30  |  0.81    Ca    8.9      07 May 2024 08:15      PT/INR - ( 07 May 2024 08:15 )   PT: 30.1 sec;   INR: 2.74 ratio           Urinalysis Basic - ( 07 May 2024 08:15 )    Color: x / Appearance: x / SG: x / pH: x  Gluc: 119 mg/dL / Ketone: x  / Bili: x / Urobili: x   Blood: x / Protein: x / Nitrite: x   Leuk Esterase: x / RBC: x / WBC x   Sq Epi: x / Non Sq Epi: x / Bacteria: x

## 2024-05-07 NOTE — DISCHARGE NOTE PROVIDER - HOSPITAL COURSE
Admission HPI: 74 yo female with PMHx of HTN, DM2, AS, CAD, PVD, HFpEF (55%) Afib on Coumadin s/p ablation 2023 presents to the ED BIBEMS for progressive shortness of breath and JETT admitted for acute hypoxemic respiratory failure due to influenza and acute exacerbation of CHF requiring Bipap. Stabilized off bipap on 02 , transferred to [facility]     5/7: Patient seen and examined at bedside. Overall improving. Less congested on exam. Declining home care. Discharge home with steroid taper.     Hospital course (by problem):   # Acute hypoxic resp failure - RESOLVED  # Decompensated HFpEF suspect due to noncompliance with diet - RESOLVED  # Viral URI 2/2 + Influenza A 0 IMPROVING   - tamiflu x 5 days   - monitor temps  - Blood cultures no growth  - s/p IV Lasix, transition to Lasix 20 daily   - echo preserved EF. moderate to severe AS  - Mucinex  - Chest PT  - Solumedrol daily, transitioned to PO Prednisone.     #a.fib with RVR due to the above  - BB increased to Toprol 50 BID, improved   - INR 2.74, resume home Warfarin 5 mg daily     # Hypothyroidism  - TSH low but normal t4. Repeat outpatient     #Positive urine culture  Results noted. asymptomatic. No need to tx    Physican Exam:  LOS: 8d    VITALS:   T(C): 36.3 (05-07-24 @ 08:13), Max: 37.1 (05-06-24 @ 21:10)  HR: 86 (05-07-24 @ 08:13) (57 - 115)  BP: 115/75 (05-07-24 @ 08:13) (102/66 - 115/75)  RR: 18 (05-07-24 @ 08:13) (18 - 18)  SpO2: 97% (05-07-24 @ 08:13) (96% - 98%)    PHYSICAL EXAM:  General: comfortable, no acute distress  HEENT: Atraumatic, no LAD, trachea midline, PERRLA  Cardiovascular: normal s1s2, no murmurs, gallops or friction rubs  Pulmonary: CTA bilaterally   Gastrointestinal: soft non tender non distended, no masses felt, no organomegaly  Extremities: Chronic bilateral LE venous stasis changes   Neurological: CN II-12 intact. No focal weakness  Psychiatrical: normal mood, cooperative  SKIN: no rash, lesions or ulcers

## 2024-05-07 NOTE — PROGRESS NOTE ADULT - REASON FOR ADMISSION
CHF exacerbation , Flu

## 2024-05-07 NOTE — DISCHARGE NOTE PROVIDER - NSDCMRMEDTOKEN_GEN_ALL_CORE_FT
Acidophilus oral tablet: 1 tab(s) orally once a day  albuterol 90 mcg/inh inhalation aerosol: 2 puff(s) inhaled every 6 hours As needed Shortness of Breath and/or Wheezing  benzonatate 100 mg oral capsule: 1 cap(s) orally every 8 hours as needed for Cough  cholestyramine 4 g/5 g oral powder for reconstitution: 1 packet(s) orally once a day (in the morning)  ezetimibe 10 mg oral tablet: 1 tab(s) orally once a day (in the evening)  famotidine 20 mg oral tablet: 1 tab(s) orally once a day  fluticasone 50 mcg/inh nasal spray: 1 spray(s) nasal 2 times a day  furosemide 20 mg oral tablet: 1 tab(s) orally once a day  gabapentin 800 mg oral tablet: 1 tab(s) orally 3 times a day  guaiFENesin 600 mg oral tablet, extended release: 1 tab(s) orally every 12 hours  losartan 25 mg oral tablet: 1 tab(s) orally once a day  metFORMIN 1000 mg oral tablet: 1 tab(s) orally 2 times a day  metoprolol succinate 50 mg oral tablet, extended release: 1 tab(s) orally once a day  montelukast 10 mg oral tablet: 1 tab(s) orally once a day (at bedtime)  pantoprazole 40 mg oral delayed release tablet: 1 tab(s) orally once a day (before a meal)  pravastatin 10 mg oral tablet: 1 tab(s) orally once a day (in the evening)  predniSONE 10 mg oral tablet: 1 tab(s) orally once a day (at bedtime) 5/8: 40 mg once daily   5/9: 30 mg once daily  5/10: 20 mg once daily  5/11: 10 mg once daily  5/12: 5 mg once daily  spironolactone 25 mg oral tablet: 1 tab(s) orally once a day  Vitamin D3 25 mcg (1000 intl units) oral capsule: 1 cap(s) orally once a day  warfarin 5 mg oral tablet: 1 tab(s) orally once a day (at bedtime)  zolpidem 10 mg oral tablet: 1 tab(s) orally once a day (at bedtime)

## 2024-05-07 NOTE — PROGRESS NOTE ADULT - ASSESSMENT
Pt calm, cooperative.  Restraints removed. Reviewed reasons restraints would be reapplied if needed, she indicates understanding.  Bed & chair alarm in use.   PROBLEMS:    Acute hypoxic resp failure - multifactorial   Decompensated HFpEF suspect due to noncompliance with diet  Viral URI 2/2 + Influenza A   A.fib with RVR due to the above  Hypothyroidism    PLAN:    pulmonary better-OOb/PT-decd planning  IV solumedrol 40mg [id number] TO PO predniosne 40mg daily  Neb with mucomyst-change to q6hr  Chest PT with nasal saline solution  Tamiflu x 5 days  Trend WBC  Monitor temps. f/u blood and urine cultures.   Diurese  Echo  Afib with RVR Lovenox 120mg bid to bridge warfarin 5mg qd.  DVT PROPHYLASIX

## 2024-05-08 ENCOUNTER — TRANSCRIPTION ENCOUNTER (OUTPATIENT)
Age: 74
End: 2024-05-08

## 2024-05-13 ENCOUNTER — TRANSCRIPTION ENCOUNTER (OUTPATIENT)
Age: 74
End: 2024-05-13

## 2024-05-14 ENCOUNTER — APPOINTMENT (OUTPATIENT)
Dept: CARE COORDINATION | Facility: HOME HEALTH | Age: 74
End: 2024-05-14
Payer: MEDICARE

## 2024-05-14 VITALS
DIASTOLIC BLOOD PRESSURE: 70 MMHG | SYSTOLIC BLOOD PRESSURE: 120 MMHG | RESPIRATION RATE: 18 BRPM | HEART RATE: 75 BPM | OXYGEN SATURATION: 97 %

## 2024-05-14 DIAGNOSIS — I50.30 UNSPECIFIED DIASTOLIC (CONGESTIVE) HEART FAILURE: ICD-10-CM

## 2024-05-14 DIAGNOSIS — I73.9 PERIPHERAL VASCULAR DISEASE, UNSPECIFIED: ICD-10-CM

## 2024-05-14 DIAGNOSIS — E11.9 TYPE 2 DIABETES MELLITUS W/OUT COMPLICATIONS: ICD-10-CM

## 2024-05-14 DIAGNOSIS — I48.91 UNSPECIFIED ATRIAL FIBRILLATION: ICD-10-CM

## 2024-05-14 DIAGNOSIS — R19.7 DIARRHEA, UNSPECIFIED: ICD-10-CM

## 2024-05-14 PROCEDURE — 99495 TRANSJ CARE MGMT MOD F2F 14D: CPT

## 2024-05-14 RX ORDER — FUROSEMIDE 40 MG/1
40 TABLET ORAL TWICE DAILY
Refills: 0 | Status: DISCONTINUED | COMMUNITY
Start: 2022-06-16 | End: 2024-05-14

## 2024-05-14 RX ORDER — FUROSEMIDE 20 MG/1
20 TABLET ORAL DAILY
Qty: 90 | Refills: 2 | Status: ACTIVE | COMMUNITY
Start: 2024-05-14

## 2024-05-14 RX ORDER — UBIDECARENONE/VIT E ACET 100MG-5
25 MCG CAPSULE ORAL DAILY
Refills: 0 | Status: ACTIVE | COMMUNITY
Start: 2024-05-14

## 2024-05-14 RX ORDER — PANTOPRAZOLE 40 MG/1
40 TABLET, DELAYED RELEASE ORAL DAILY
Qty: 30 | Refills: 0 | Status: ACTIVE | COMMUNITY
Start: 2024-05-14

## 2024-05-14 NOTE — HISTORY OF PRESENT ILLNESS
[Post-hospitalization from ___ Hospital] : Post-hospitalization from [unfilled] Hospital [Admitted on: ___] : The patient was admitted on [unfilled] [Discharged on ___] : discharged on [unfilled] [Discharge Summary] : discharge summary [Discharge Med List] : discharge medication list [Med Reconciliation] : medication reconciliation has been completed [Patient Contacted By: ____] : and contacted by [unfilled] [FreeTextEntry2] : 74 yo female with PMHx of HTN, DM2, AS, CAD, PVD, HFpEF (55%) Afib on Coumadin s/p ablation 2023 presents to the ED BIBEMS for progressive shortness of breath and JETT admitted for acute hypoxemic respiratory failure due to influenza and acute exacerbation of CHF requiring Bipap. Stabilized off bipap on 02 , transferred to tele   5/7: Patient seen and examined at bedside. Overall improving. Less congested on exam. Declining home care. Discharge home with steroid taper.   Hospital course (by problem): # Acute hypoxic resp failure - RESOLVED # Decompensated HFpEF suspect due to noncompliance with diet - RESOLVED # Viral URI 2/2 + Influenza A 0 IMPROVING - tamiflu x 5 days - monitor temps - Blood cultures no growth - s/p IV Lasix, transition to Lasix 20 daily - echo preserved EF. moderate to severe AS - Mucinex - Chest PT - Solumedrol daily, transitioned to PO Prednisone.   #a.fib with RVR due to the above - BB increased to Toprol 50 BID, improved - INR 2.74, resume home Warfarin 5 mg daily   # Hypothyroidism - TSH low but normal t4. Repeat outpatient   #Positive urine culture Results noted. asymptomatic. No need to tx  CC: Pt is seen at home s/p recent admission for Flu/CHF. Pt is temporarily unable to leave home as it requires a considerable and taxing effort HPI: Pt Is a 74 y/o female enrolled in the [facility] seen at home s/p a recent admission for Flu/CHF. Pt was contacted by TCM RN on 5/8 and med rec was done within 48 hours of DC.  Upon exam A&O x 3 NAD.  and son are present for visit. She reports not feeling great since being home from hospital. Reports 1-2 episodes per day of nausea with heaving plus diarrhea. Her and her  had contracted viruses while on a cruise and she was admitted the day she returned to [place]. She is following the BRAT diet. Holding metformin when needed and she has not taken any lasix. No signs volume overload, No N/V/D today as of yet. This NP spoke with nurse [place] at KASSY Mayer's office to see if home labs could be ordered. Afib: slightly irregular, rate controlled. Declined HC services. Has appt with KASSY Mayer tomorrow

## 2024-05-14 NOTE — PHYSICAL EXAM
[No Acute Distress] : no acute distress [Well Nourished] : well nourished [Well Developed] : well developed [Normal Sclera/Conjunctiva] : normal sclera/conjunctiva [Normal Outer Ear/Nose] : the outer ears and nose were normal in appearance [Normal Oropharynx] : the oropharynx was normal [Supple] : supple [No Respiratory Distress] : no respiratory distress  [Clear to Auscultation] : lungs were clear to auscultation bilaterally [No Accessory Muscle Use] : no accessory muscle use [Normal Rate] : normal rate  [Regular Rhythm] : with a regular rhythm [Normal S1, S2] : normal S1 and S2 [No Extremity Clubbing/Cyanosis] : no extremity clubbing/cyanosis [Soft] : abdomen soft [Non Tender] : non-tender [Non-distended] : non-distended [Normal Bowel Sounds] : normal bowel sounds [No Spinal Tenderness] : no spinal tenderness [Grossly Normal Strength/Tone] : grossly normal strength/tone [No Rash] : no rash [Coordination Grossly Intact] : coordination grossly intact [No Focal Deficits] : no focal deficits [Normal Affect] : the affect was normal [Alert and Oriented x3] : oriented to person, place, and time [de-identified] : no thrush [de-identified] : 3/6 systolic murmur [de-identified] : trace edema BLE, L>R, + venous stasis, hemosiderin staining, no ulcers, diminished pulses [de-identified] : resolving ecchymosis R lower abdomen

## 2024-05-14 NOTE — REVIEW OF SYSTEMS
[Lower Ext Edema] : lower extremity edema [Negative] : Psychiatric [FreeTextEntry2] : tired [FreeTextEntry7] : intermittent N/V/D

## 2024-05-14 NOTE — PLAN
[FreeTextEntry1] : A/P: # CHF: - no signs volume overload - has not been checking wts, is following low Na diet - has not taken lasix 2/2 diarrhea, Nausea with heaving - call for increased edema/weight/SOB/orthopnea - f/u Dr. Mayer tomorrow  # PVD: - + venous stasis BLE, hemosiderin staining, diminished pulses, no ulcers - monitor skin integrity for wounds/temperature.color changes - call for ulcer, coolness to feet, discolored toes  # DM: - controlled A1c 5.8 - diabetic diet, metformin  # Afib: - rate controlled, slightly irregular - con't BB, coumadin - PT/INR managed by Dr. Mayer  # Diarrhea: - with intermittent Nausea/dry heaves 1-2x per day for past few days - none as of yet today - con't BRAT diet, Questran (started by GI years ago for colitis) - f/u PCP tomorrow. Home lab draw requested

## 2024-05-15 ENCOUNTER — EMERGENCY (EMERGENCY)
Facility: HOSPITAL | Age: 74
LOS: 0 days | Discharge: HOME | End: 2024-05-16
Attending: EMERGENCY MEDICINE
Payer: MEDICARE

## 2024-05-15 VITALS
SYSTOLIC BLOOD PRESSURE: 115 MMHG | HEART RATE: 61 BPM | OXYGEN SATURATION: 100 % | HEIGHT: 70 IN | RESPIRATION RATE: 18 BRPM | WEIGHT: 160.06 LBS | DIASTOLIC BLOOD PRESSURE: 79 MMHG | TEMPERATURE: 98 F

## 2024-05-15 DIAGNOSIS — Z79.01 LONG TERM (CURRENT) USE OF ANTICOAGULANTS: ICD-10-CM

## 2024-05-15 DIAGNOSIS — I48.91 UNSPECIFIED ATRIAL FIBRILLATION: ICD-10-CM

## 2024-05-15 DIAGNOSIS — I11.0 HYPERTENSIVE HEART DISEASE WITH HEART FAILURE: ICD-10-CM

## 2024-05-15 DIAGNOSIS — R50.9 FEVER, UNSPECIFIED: ICD-10-CM

## 2024-05-15 DIAGNOSIS — Z98.890 OTHER SPECIFIED POSTPROCEDURAL STATES: Chronic | ICD-10-CM
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
## 2024-05-15 DIAGNOSIS — I25.10 ATHEROSCLEROTIC HEART DISEASE OF NATIVE CORONARY ARTERY WITHOUT ANGINA PECTORIS: ICD-10-CM

## 2024-05-15 DIAGNOSIS — R19.7 DIARRHEA, UNSPECIFIED: ICD-10-CM

## 2024-05-15 DIAGNOSIS — I50.30 UNSPECIFIED DIASTOLIC (CONGESTIVE) HEART FAILURE: ICD-10-CM

## 2024-05-15 DIAGNOSIS — E83.42 HYPOMAGNESEMIA: ICD-10-CM

## 2024-05-15 DIAGNOSIS — E03.9 HYPOTHYROIDISM, UNSPECIFIED: ICD-10-CM

## 2024-05-15 DIAGNOSIS — R79.1 ABNORMAL COAGULATION PROFILE: ICD-10-CM

## 2024-05-15 DIAGNOSIS — K95.09 OTHER COMPLICATIONS OF GASTRIC BAND PROCEDURE: Chronic | ICD-10-CM

## 2024-05-15 DIAGNOSIS — Z90.49 ACQUIRED ABSENCE OF OTHER SPECIFIED PARTS OF DIGESTIVE TRACT: Chronic | ICD-10-CM

## 2024-05-15 DIAGNOSIS — R06.02 SHORTNESS OF BREATH: ICD-10-CM

## 2024-05-15 DIAGNOSIS — J96.01 ACUTE RESPIRATORY FAILURE WITH HYPOXIA: ICD-10-CM

## 2024-05-15 DIAGNOSIS — Z91.040 LATEX ALLERGY STATUS: ICD-10-CM

## 2024-05-15 DIAGNOSIS — Z91.041 RADIOGRAPHIC DYE ALLERGY STATUS: ICD-10-CM

## 2024-05-15 DIAGNOSIS — I35.0 NONRHEUMATIC AORTIC (VALVE) STENOSIS: ICD-10-CM

## 2024-05-15 DIAGNOSIS — J10.1 INFLUENZA DUE TO OTHER IDENTIFIED INFLUENZA VIRUS WITH OTHER RESPIRATORY MANIFESTATIONS: ICD-10-CM

## 2024-05-15 DIAGNOSIS — Z88.0 ALLERGY STATUS TO PENICILLIN: ICD-10-CM

## 2024-05-15 DIAGNOSIS — E86.0 DEHYDRATION: ICD-10-CM

## 2024-05-15 DIAGNOSIS — S30.1XXA CONTUSION OF ABDOMINAL WALL, INITIAL ENCOUNTER: ICD-10-CM
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
## 2024-05-15 DIAGNOSIS — Z91.119 PATIENT'S NONCOMPLIANCE WITH DIETARY REGIMEN DUE TO UNSPECIFIED REASON: ICD-10-CM

## 2024-05-15 DIAGNOSIS — E11.51 TYPE 2 DIABETES MELLITUS WITH DIABETIC PERIPHERAL ANGIOPATHY WITHOUT GANGRENE: ICD-10-CM

## 2024-05-15 DIAGNOSIS — I50.33 ACUTE ON CHRONIC DIASTOLIC (CONGESTIVE) HEART FAILURE: ICD-10-CM

## 2024-05-15 DIAGNOSIS — Z79.84 LONG TERM (CURRENT) USE OF ORAL HYPOGLYCEMIC DRUGS: ICD-10-CM

## 2024-05-15 DIAGNOSIS — E66.9 OBESITY, UNSPECIFIED: ICD-10-CM

## 2024-05-15 DIAGNOSIS — Z98.49 CATARACT EXTRACTION STATUS, UNSPECIFIED EYE: Chronic | ICD-10-CM

## 2024-05-15 LAB
ALBUMIN SERPL ELPH-MCNC: 3 G/DL — LOW (ref 3.3–5)
ALP SERPL-CCNC: 62 U/L — SIGNIFICANT CHANGE UP (ref 40–120)
ALT FLD-CCNC: 35 U/L — SIGNIFICANT CHANGE UP (ref 12–78)
ANION GAP SERPL CALC-SCNC: 6 MMOL/L — SIGNIFICANT CHANGE UP (ref 5–17)
APTT BLD: 65 SEC — HIGH (ref 24.5–35.6)
AST SERPL-CCNC: 17 U/L — SIGNIFICANT CHANGE UP (ref 15–37)
BASOPHILS # BLD AUTO: 0.06 K/UL — SIGNIFICANT CHANGE UP (ref 0–0.2)
BASOPHILS NFR BLD AUTO: 0.5 % — SIGNIFICANT CHANGE UP (ref 0–2)
BILIRUB SERPL-MCNC: 0.5 MG/DL — SIGNIFICANT CHANGE UP (ref 0.2–1.2)
BLD GP AB SCN SERPL QL: SIGNIFICANT CHANGE UP
BUN SERPL-MCNC: 21 MG/DL — SIGNIFICANT CHANGE UP (ref 7–23)
CALCIUM SERPL-MCNC: 9.2 MG/DL — SIGNIFICANT CHANGE UP (ref 8.5–10.1)
CHLORIDE SERPL-SCNC: 110 MMOL/L — HIGH (ref 96–108)
CO2 SERPL-SCNC: 23 MMOL/L — SIGNIFICANT CHANGE UP (ref 22–31)
CREAT SERPL-MCNC: 0.66 MG/DL — SIGNIFICANT CHANGE UP (ref 0.5–1.3)
EGFR: 92 ML/MIN/1.73M2 — SIGNIFICANT CHANGE UP
EOSINOPHIL # BLD AUTO: 0.12 K/UL — SIGNIFICANT CHANGE UP (ref 0–0.5)
EOSINOPHIL NFR BLD AUTO: 0.9 % — SIGNIFICANT CHANGE UP (ref 0–6)
GLUCOSE SERPL-MCNC: 86 MG/DL — SIGNIFICANT CHANGE UP (ref 70–99)
HCT VFR BLD CALC: 43.6 % — SIGNIFICANT CHANGE UP (ref 34.5–45)
HGB BLD-MCNC: 13.8 G/DL — SIGNIFICANT CHANGE UP (ref 11.5–15.5)
IMM GRANULOCYTES NFR BLD AUTO: 0.4 % — SIGNIFICANT CHANGE UP (ref 0–0.9)
INR BLD: 8.05 RATIO — CRITICAL HIGH (ref 0.85–1.18)
LIDOCAIN IGE QN: 59 U/L — SIGNIFICANT CHANGE UP (ref 13–75)
LYMPHOCYTES # BLD AUTO: 1.96 K/UL — SIGNIFICANT CHANGE UP (ref 1–3.3)
LYMPHOCYTES # BLD AUTO: 15.2 % — SIGNIFICANT CHANGE UP (ref 13–44)
MCHC RBC-ENTMCNC: 30.2 PG — SIGNIFICANT CHANGE UP (ref 27–34)
MCHC RBC-ENTMCNC: 31.7 GM/DL — LOW (ref 32–36)
MCV RBC AUTO: 95.4 FL — SIGNIFICANT CHANGE UP (ref 80–100)
MONOCYTES # BLD AUTO: 1.16 K/UL — HIGH (ref 0–0.9)
MONOCYTES NFR BLD AUTO: 9 % — SIGNIFICANT CHANGE UP (ref 2–14)
NEUTROPHILS # BLD AUTO: 9.57 K/UL — HIGH (ref 1.8–7.4)
NEUTROPHILS NFR BLD AUTO: 74 % — SIGNIFICANT CHANGE UP (ref 43–77)
PLATELET # BLD AUTO: 327 K/UL — SIGNIFICANT CHANGE UP (ref 150–400)
POTASSIUM SERPL-MCNC: 3.9 MMOL/L — SIGNIFICANT CHANGE UP (ref 3.5–5.3)
POTASSIUM SERPL-SCNC: 3.9 MMOL/L — SIGNIFICANT CHANGE UP (ref 3.5–5.3)
PROT SERPL-MCNC: 6.7 GM/DL — SIGNIFICANT CHANGE UP (ref 6–8.3)
PROTHROM AB SERPL-ACNC: 85.6 SEC — HIGH (ref 9.5–13)
RBC # BLD: 4.57 M/UL — SIGNIFICANT CHANGE UP (ref 3.8–5.2)
RBC # FLD: 13.5 % — SIGNIFICANT CHANGE UP (ref 10.3–14.5)
SODIUM SERPL-SCNC: 139 MMOL/L — SIGNIFICANT CHANGE UP (ref 135–145)
WBC # BLD: 12.92 K/UL — HIGH (ref 3.8–10.5)
WBC # FLD AUTO: 12.92 K/UL — HIGH (ref 3.8–10.5)

## 2024-05-15 PROCEDURE — 83690 ASSAY OF LIPASE: CPT

## 2024-05-15 PROCEDURE — 85610 PROTHROMBIN TIME: CPT

## 2024-05-15 PROCEDURE — 80053 COMPREHEN METABOLIC PANEL: CPT

## 2024-05-15 PROCEDURE — 85025 COMPLETE CBC W/AUTO DIFF WBC: CPT

## 2024-05-15 PROCEDURE — 86850 RBC ANTIBODY SCREEN: CPT

## 2024-05-15 PROCEDURE — 85730 THROMBOPLASTIN TIME PARTIAL: CPT

## 2024-05-15 PROCEDURE — 99285 EMERGENCY DEPT VISIT HI MDM: CPT

## 2024-05-15 PROCEDURE — 36415 COLL VENOUS BLD VENIPUNCTURE: CPT

## 2024-05-15 PROCEDURE — 86900 BLOOD TYPING SEROLOGIC ABO: CPT

## 2024-05-15 PROCEDURE — 86901 BLOOD TYPING SEROLOGIC RH(D): CPT

## 2024-05-15 PROCEDURE — 99283 EMERGENCY DEPT VISIT LOW MDM: CPT | Mod: 25

## 2024-05-15 RX ORDER — PHYTONADIONE (VIT K1) 5 MG
10 TABLET ORAL ONCE
Refills: 0 | Status: COMPLETED | OUTPATIENT
Start: 2024-05-15 | End: 2024-05-15

## 2024-05-15 RX ORDER — SODIUM CHLORIDE 9 MG/ML
1000 INJECTION INTRAMUSCULAR; INTRAVENOUS; SUBCUTANEOUS ONCE
Refills: 0 | Status: COMPLETED | OUTPATIENT
Start: 2024-05-15 | End: 2024-05-15

## 2024-05-15 RX ADMIN — SODIUM CHLORIDE 666.67 MILLILITER(S): 9 INJECTION INTRAMUSCULAR; INTRAVENOUS; SUBCUTANEOUS at 21:07

## 2024-05-15 RX ADMIN — Medication 10 MILLIGRAM(S): at 23:33

## 2024-05-15 NOTE — ED ADULT NURSE NOTE - CAS DISCH TRANSFER METHOD
[Chills] : chills [Diarrhea] : diarrhea [Moderate] : moderate [___ Days ago] : [unfilled] days ago [Episodic] : episodic [Abdominal Pain] : abdominal pain [Stable] : stable [Spouse] : spouse [Nausea] : no nausea [Vomiting] : no vomiting [FreeTextEntry8] : CHANTEL KENNEY is a 51 year old M who presents today for an acute visit. Private car

## 2024-05-15 NOTE — ED PROVIDER NOTE - CARE PROVIDER_API CALL
Justo Mayer  Cardiovascular Disease  [address], [address]  [place] [address]  Phone: [phone]  Fax: [phone]  Follow Up Time:

## 2024-05-15 NOTE — ED PROVIDER NOTE - MDM ORDERS SUBMITTED SELECTION
Medical record request has processed on 10/17/2023
Medical record request was received from [facility] for the time frame of 8/12/2023 to Present.  Will be place in Dr. Griselda Favre by the end of the day to be sign by:    Dr. Nettie Platt, MARIA GW
Medications

## 2024-05-15 NOTE — ED PROVIDER NOTE - NSFOLLOWUPINSTRUCTIONS_ED_ALL_ED_FT
Hold Warfarin until advised otherwise by Cardiology office.  Follow Cardiology Dr. Mayer: call tomorrow for appointment.  Continue your other medications as per routine.  Next diarrhea: collect stool samples & send as outpatient via collection bottles you already were provide with by your own doctor.  Return to ED IMMEDIATELY if any bleeding occurs.        Diarrhea    Diarrhea is frequent loose or watery bowel movements that has many causes. Diarrhea can make you feel weak and cause you to become dehydrated. Diarrhea typically lasts 2–3 days, but can last longer if it is a sign of something more serious. Drink clear fluids to prevent dehydration. Eat bland, easy-to-digest foods as tolerated.     SEEK IMMEDIATE MEDICAL CARE IF YOU HAVE ANY OF THE FOLLOWING SYMPTOMS: high fevers, lightheadedness/dizziness, chest pain, black or bloody stools, shortness of breath, severe abdominal or back pain, or any signs of dehydration.

## 2024-05-15 NOTE — ED PROVIDER NOTE - SKIN, MLM
Skin normal color for race, warm, dry and intact. No evidence of rash. Skin normal color for race, warm, dry and intact. No evidence of rash. Old bruising RLQ abd at sites of med injections when in hospital, no new worsening.

## 2024-05-15 NOTE — ED PROVIDER NOTE - CLINICAL SUMMARY MEDICAL DECISION MAKING FREE TEXT BOX
21:45, c Cindy TIRADO:  Case d/w Dr. KENDRICK Jimenez covering for Dr. Mayer, incl. clinical presentation & labs results with high INR =8 & no active bleeding: agrees with Vit k 10 mg po, Cardiology office f/u, return immediately to ED if any active bleeding. 21:45, c Cindy TIRADO:  Case d/w Dr. KENDRICK Jimenez covering for Dr. Mayer, incl. clinical presentation & labs results with high INR =8 & no active bleeding: agrees with Vit k 10 mg po, Cardiology office f/u, return immediately to ED if any active bleeding.    23;50, C MD Cindy:  Pt received IVF, po Vit K; has not been able to give stool sample.  Pt w/o abd. pain, feels somewhat improved.  No current indication requiring med admit.  pt agrees to hold Warfarin pending Cardiology office re-eval, stool studies as outpt as already arranged by PMD, GI outpt f/u.

## 2024-05-15 NOTE — ED PROVIDER NOTE - ENMT, MLM
Airway patent, Nasal mucosa clear. Mouth with mild- moderately dry mucosa. Throat has no vesicles, no oropharyngeal exudates and uvula is midline.

## 2024-05-15 NOTE — ED PROVIDER NOTE - OBJECTIVE STATEMENT
74 yo female with PMHx of HTN, DM2, AS, CAD, PVD, HFpEF (55%) Afib on Coumadin s/p ablation 2023, HH admit for acute hypoxemic respiratory failure due to influenza and acute exacerbation of CHF initially requiring Bipap, now BIB pvt car via son regarding persistent D at home since dc (r/o C. Diff0 & outpt blood work at cardiology office today critically high INR >8, + dehydrated.  Positive general weakness mild SOB recent poor appetite.  Patient denies epistaxis, bleeding from mouth, gross hematuria, unaware if any blood in stools.  Denies abdominal pain.  Patient reports orange discolored very loose/watery stools 2-3 times a day since  after discharged.  PCP/Cardio:  Leyla

## 2024-05-15 NOTE — ED ADULT TRIAGE NOTE - CHIEF COMPLAINT QUOTE
Pt presents to ED sent in by Dr. Mayer for INR above 8.0. pt takes 5mg coumadin daily. hx afib. hr at  office 116. pt also has been having diffuse diarrhea, and possible C-Diff.

## 2024-05-15 NOTE — ED PROVIDER NOTE - PHYSICAL EXAMINATION
old bruising RLQ abd at sites of med injections when in hopital. Old bruising RLQ abd at sites of med injections when in hospital.

## 2024-05-15 NOTE — ED PROVIDER NOTE - CARE PLAN
Principal Discharge DX:	Supratherapeutic INR  Secondary Diagnosis:	Diarrhea  Secondary Diagnosis:	Dehydration   1

## 2024-05-15 NOTE — ED PROVIDER NOTE - PATIENT PORTAL LINK FT
You can access the FollowMyHealth Patient Portal offered by [facility] by registering at the following website: [email or web address]. By joining [employer]’s FollowMyHealth portal, you will also be able to view your health information using other applications (apps) compatible with our system.

## 2024-05-15 NOTE — ED ADULT NURSE NOTE - OBJECTIVE STATEMENT
pt presenting for reported out pt elevated INR and the Dr told her she needs vitamin K "but he doesn't know what kind." pt also reporting ongoing diarrhea, wants eval for cdiff. Pt in no distress, well appearing.

## 2024-05-15 NOTE — ED PROVIDER NOTE - MUSCULOSKELETAL, MLM
Spine appears normal, range of motion is not limited, no muscle or joint tenderness Spine appears normal, CAPPS x 4, though decreased SLR B/L, nonacute.  No focal extremity tender.

## 2024-05-15 NOTE — ED PROVIDER NOTE - GASTROINTESTINAL, MLM
Abdomen soft, non-tender, no guarding.  BS hypoactive, normal pitch.  Old bruising RLQ abd at sites of med injections when in hospital.

## 2024-05-15 NOTE — ED ADULT NURSE REASSESSMENT NOTE - NS ED NURSE REASSESS COMMENT FT1
Assumed care at 2220, pt resting in stretcher awake and alert w visitor at bedside, plan of care discussed, pt has no concerns at this time, comfort and safety maintained.

## 2024-05-15 NOTE — ED ADULT TRIAGE NOTE - HOW PATIENT ADDRESSED, PROFILE
Render Post-Care Instructions In Note?: no
Detail Level: Simple
Post-Care Instructions: I reviewed with the patient in detail post-care instructions. Patient is to wear sunprotection, and avoid picking at any of the treated lesions. Pt may apply Vaseline to crusted or scabbing areas.
Consent: The patient's consent was obtained including but not limited to risks of crusting, scabbing, blistering, scarring, darker or lighter pigmentary change, recurrence, incomplete removal and infection.
Number Of Freeze-Thaw Cycles: 1 freeze-thaw cycle
Duration Of Freeze Thaw-Cycle (Seconds): 5
Jil

## 2024-05-15 NOTE — ED PROVIDER NOTE - CONSTITUTIONAL, MLM
Obese WF, awake, alert, oriented to person, place, time/situation and in no apparent distress. normal...

## 2024-05-16 ENCOUNTER — TRANSCRIPTION ENCOUNTER (OUTPATIENT)
Age: 74
End: 2024-05-16

## 2024-05-16 VITALS
TEMPERATURE: 98 F | OXYGEN SATURATION: 100 % | DIASTOLIC BLOOD PRESSURE: 76 MMHG | HEART RATE: 79 BPM | SYSTOLIC BLOOD PRESSURE: 109 MMHG | RESPIRATION RATE: 18 BRPM

## 2024-05-24 ENCOUNTER — TRANSCRIPTION ENCOUNTER (OUTPATIENT)
Age: 74
End: 2024-05-24

## 2024-06-04 ENCOUNTER — TRANSCRIPTION ENCOUNTER (OUTPATIENT)
Age: 74
End: 2024-06-04

## 2024-06-06 NOTE — H&P PST ADULT - RESPIRATORY AND THORAX
[FreeTextEntry1] : After discussing various treatment options with the patient including but not limited to oral medications, physical therapy, exercise, modalities as well as interventional spinal injections, we have decided with the following plan:  1) Intervention Injection Therapy: I personally reviewed the MRI/CT scan images and agree with the radiologist's report. The radiological findings were discussed with the patient. The risks, benefits, contents and alternatives to injection were explained in full to the patient. Risks outlined include but are not limited to infection,sepsis, bleeding, post-dural puncture headache, nerve damage, temporary increase in pain, syncopal episode, failure to resolve symptoms, allergic reaction, symptom recurrence, and elevation of blood sugar in diabetics. Cortisone may cause immunosuppression. Patient understands the risks. All questions were answered. After discussion of options, patient requested an injection. Information regarding the injection was given to the patient. Which medications to stop prior to the injection was explained to the patient as well.  Follow up in 1-2 weeks post injection for re-evaluation.  Continue Home exercises, stretching, activity modification, physical therapy, and conservative care.  Patient presents with axial lumbar pain that has not responded to  3 months of conservative therapy including physical therapy or NSAID therapy.  The pain is interfering with activities of daily living and functionality.   There is no radicular pain.   The pain is exacerbated by facet loading.  Positive Kemps maneuver which is defined by pain reproduction with extension and rotation of the lumbar spine to the affected side.  The patient has not had a vertebral fusion at the levels of the proposed treatment.  There is no unexplained neurologic deficit.  There is no history of systemic infection, unstable medical condition, bleeding tendency, or local infection.  The injection is being performed to diagnose the facet joint as the source of the individual's pain.   BL lumbar MBB #1  2) EMG/NCV to evaluate for radic vs neuropathy  . 
details…

## 2024-06-11 NOTE — ADDENDUM
[FreeTextEntry1] : patient has a mobility limitation that impairs ability to participate in MRADLs such as toileting, grooming, and bathing. This issue will be resolved with use of walker and not cane or crutch relates to diagnosis of peripheral vascular disease. Patient functional mobility will be resolved with use of wheeled walker (with seat) and can safely be used in home. Wheels required as patient unable to safely lift walker. Seat required as patient needs frequent rest while ambulating and to provide safety- unsteady gait.

## 2024-06-11 NOTE — ASSESSMENT
[FreeTextEntry1] : 71 y/o woman with healed right leg ulcer and left leg swelling and pain . Pt has lymphedema, chronic venous stasis hyperpigmentation and dermatitis and peripheral neuropathy.  We discussed she will need a neurologic evaluation to diagnose etiology of pain.  - Pt counseled to use compression stockings  - Continue Coumadin, ASA 81 mg and Pravastatin QD. - Continue Gabapentin to 800 mg TID for neuropathic pain. - Continue medical management of HTN. -  Continue conservative therapy: compression stockings 20-30 mmHg daily from awakening until bedtime, leg elevation above the level of the heart at rest and frequent ambulation. - Walk daily for exercise. - Refer to Neurology for evaluation of peripheral neuropathy. - RTC in 6 months   A total of 20 minutes was spent with patient and coordinating care

## 2024-06-11 NOTE — HISTORY OF PRESENT ILLNESS
[FreeTextEntry1] : 8/6/21: 72 y/o female returns the office for follow-up. PMH:  DM2, HTN and A Fib. venous insufficiency, lymphedema and recurrent ulcers  She reports increased pain to her feet even with use of gabapentin 3 times a day.  In the past she states prednisone has helped but she never has had drastic improvement of symptoms.  She was advised to seek neurologic evaluation for symptoms but has not as of yet.  There continues to be improvement in edema to her legs with use of conservative treatment: Compression stockings 20 to 30 mmHg daily and lymphedema pumps.  She walks daily for exercise, elevates her legs at rest and ambulates frequently.  Pain is described as burning and tingling pain to her legs all day. She has been seeing a pain management specialist, but has not had relief of pain.  She takes ASA 81 mg, Coumadin and Pravastatin 10 mg QD. No current pain reported. No fever or chills. She is a non-smoker.   3/9/22: Pt still has leg pain since last visit. She feels the legs felt much better on prednisone. She has pain in her medial ankles bilaterally. She has been compliant with compression stockings. She does not use the pump because it feels it is too restrictive. She takes ASA 81 mg, Coumadin and Pravastatin 10 mg QD. Pt is still taking Gabapentin 800 mg. She does not take cymbalta.   6/24/22: Pt still has BLE leg pain. She feels the pain is improved since taking prednisone. She takes ASA 81 mg, Coumadin and Pravastatin 10 mg QD. Pt is still taking Gabapentin 800 mg. She does not take cymbalta.   7/8/22: Pt has increased LLE edema, erythema and pain.  She denies any recent fevers. She has been compliant with compression stockings. She takes ASA 81 mg, Coumadin and Pravastatin 10 mg QD. Pt is still taking Gabapentin 800 mg. She does not take cymbalta.   9/23/22: Pt has increased LLE edema, erythema and pain.  She denies any recent fevers. She has been compliant with compression stockings. She takes ASA 81 mg, Coumadin and Pravastatin 10 mg QD. Pt is still taking Gabapentin 800 mg. She does not take cymbalta.   10/7/22: Pt has new right ankle wound. She also has increased LE edema, erythema and pain.  She denies any recent fevers. She has been compliant with compression stockings. She takes ASA 81 mg, Coumadin and Pravastatin 10 mg QD. Pt is still taking Gabapentin 800 mg. She does not take cymbalta.   11/4/22: Pts right ankle wound is getting larger. She has increased LE edema, erythema and pain.  She denies any recent fevers. She has been compliant with compression stockings. She takes ASA 81 mg, Coumadin and Pravastatin 10 mg QD. Pt is still taking Gabapentin 800 mg. She does not take cymbalta.   11/11/22: Pts right ankle wound is getting larger. She has increased LE edema, erythema and pain.  She denies any recent fevers. She has been compliant with compression stockings. She takes ASA 81 mg, Coumadin and Pravastatin 10 mg QD. Pt is still taking Gabapentin 800 mg. She does not take cymbalta.   3/24/23: Pt doing okay since last visit. She still has LE edema, erythema and pain.  She denies any recent fevers. She has been compliant with compression stockings. She takes ASA 81 mg, Coumadin and Pravastatin 10 mg QD. Pt is still taking Gabapentin 800 mg. She does not take cymbalta.   9/13/23: Pt doing okay since last visit. She still has LE edema, erythema and pain.  She denies any recent fevers. She has been compliant with compression stockings. She takes ASA 81 mg, Coumadin and Pravastatin 10 mg QD. Pt is still taking Gabapentin 800 mg.   3/27/24: Pt hit her left 3rd toe and it has bruised. She has been having significant pain in the foot. She is compliant with Coumadin, ASA, Pravastatin.

## 2024-06-11 NOTE — PHYSICAL EXAM
[2+] : left 2+ [Ankle Swelling (On Exam)] : present [Ankle Swelling Bilaterally] : bilaterally  [] : bilaterally [Ankle Swelling On The Left] : moderate [Alert] : alert [Oriented to Person] : oriented to person [Oriented to Place] : oriented to place [Oriented to Time] : oriented to time [Calm] : calm [de-identified] : WD, WN, NAD. Awake, alert, interactive. Ambulates slowly with a cane for support. [FreeTextEntry1] : right leg ulcers healed  [de-identified] : \par

## 2024-07-22 ENCOUNTER — EMERGENCY (EMERGENCY)
Facility: HOSPITAL | Age: 74
LOS: 0 days | Discharge: HOME | End: 2024-07-22
Attending: EMERGENCY MEDICINE
Payer: MEDICARE

## 2024-07-22 VITALS
DIASTOLIC BLOOD PRESSURE: 53 MMHG | HEART RATE: 64 BPM | SYSTOLIC BLOOD PRESSURE: 126 MMHG | OXYGEN SATURATION: 98 % | WEIGHT: 254.41 LBS | TEMPERATURE: 99 F | RESPIRATION RATE: 16 BRPM

## 2024-07-22 VITALS
RESPIRATION RATE: 18 BRPM | TEMPERATURE: 99 F | SYSTOLIC BLOOD PRESSURE: 123 MMHG | DIASTOLIC BLOOD PRESSURE: 41 MMHG | OXYGEN SATURATION: 96 % | HEART RATE: 70 BPM

## 2024-07-22 DIAGNOSIS — Z98.49 CATARACT EXTRACTION STATUS, UNSPECIFIED EYE: Chronic | ICD-10-CM

## 2024-07-22 DIAGNOSIS — I11.0 HYPERTENSIVE HEART DISEASE WITH HEART FAILURE: ICD-10-CM

## 2024-07-22 DIAGNOSIS — Z98.890 OTHER SPECIFIED POSTPROCEDURAL STATES: Chronic | ICD-10-CM

## 2024-07-22 DIAGNOSIS — Z88.0 ALLERGY STATUS TO PENICILLIN: ICD-10-CM

## 2024-07-22 DIAGNOSIS — I50.9 HEART FAILURE, UNSPECIFIED: ICD-10-CM

## 2024-07-22 DIAGNOSIS — Z91.040 LATEX ALLERGY STATUS: ICD-10-CM

## 2024-07-22 DIAGNOSIS — I48.91 UNSPECIFIED ATRIAL FIBRILLATION: ICD-10-CM

## 2024-07-22 DIAGNOSIS — Z90.49 ACQUIRED ABSENCE OF OTHER SPECIFIED PARTS OF DIGESTIVE TRACT: Chronic | ICD-10-CM

## 2024-07-22 DIAGNOSIS — E11.40 TYPE 2 DIABETES MELLITUS WITH DIABETIC NEUROPATHY, UNSPECIFIED: ICD-10-CM

## 2024-07-22 DIAGNOSIS — K95.09 OTHER COMPLICATIONS OF GASTRIC BAND PROCEDURE: Chronic | ICD-10-CM

## 2024-07-22 DIAGNOSIS — R06.02 SHORTNESS OF BREATH: ICD-10-CM

## 2024-07-22 LAB
ALBUMIN SERPL ELPH-MCNC: 3.3 G/DL — SIGNIFICANT CHANGE UP (ref 3.3–5)
ALP SERPL-CCNC: 64 U/L — SIGNIFICANT CHANGE UP (ref 40–120)
ALT FLD-CCNC: 13 U/L — SIGNIFICANT CHANGE UP (ref 12–78)
ANION GAP SERPL CALC-SCNC: 7 MMOL/L — SIGNIFICANT CHANGE UP (ref 5–17)
APTT BLD: 47.7 SEC — HIGH (ref 24.5–35.6)
AST SERPL-CCNC: 16 U/L — SIGNIFICANT CHANGE UP (ref 15–37)
BASE EXCESS BLDV CALC-SCNC: -2 MMOL/L — SIGNIFICANT CHANGE UP (ref -2–3)
BASOPHILS # BLD AUTO: 0.06 K/UL — SIGNIFICANT CHANGE UP (ref 0–0.2)
BASOPHILS NFR BLD AUTO: 0.5 % — SIGNIFICANT CHANGE UP (ref 0–2)
BILIRUB SERPL-MCNC: 1 MG/DL — SIGNIFICANT CHANGE UP (ref 0.2–1.2)
BUN SERPL-MCNC: 21 MG/DL — SIGNIFICANT CHANGE UP (ref 7–23)
CALCIUM SERPL-MCNC: 9.2 MG/DL — SIGNIFICANT CHANGE UP (ref 8.5–10.1)
CHLORIDE SERPL-SCNC: 105 MMOL/L — SIGNIFICANT CHANGE UP (ref 96–108)
CO2 SERPL-SCNC: 23 MMOL/L — SIGNIFICANT CHANGE UP (ref 22–31)
CREAT SERPL-MCNC: 0.93 MG/DL — SIGNIFICANT CHANGE UP (ref 0.5–1.3)
EGFR: 64 ML/MIN/1.73M2 — SIGNIFICANT CHANGE UP
EOSINOPHIL # BLD AUTO: 0.1 K/UL — SIGNIFICANT CHANGE UP (ref 0–0.5)
EOSINOPHIL NFR BLD AUTO: 0.9 % — SIGNIFICANT CHANGE UP (ref 0–6)
GLUCOSE SERPL-MCNC: 110 MG/DL — HIGH (ref 70–99)
HCO3 BLDV-SCNC: 23 MMOL/L — SIGNIFICANT CHANGE UP (ref 22–29)
HCT VFR BLD CALC: 38 % — SIGNIFICANT CHANGE UP (ref 34.5–45)
HGB BLD-MCNC: 12.3 G/DL — SIGNIFICANT CHANGE UP (ref 11.5–15.5)
IMM GRANULOCYTES NFR BLD AUTO: 0.3 % — SIGNIFICANT CHANGE UP (ref 0–0.9)
INR BLD: 2.7 RATIO — HIGH (ref 0.85–1.18)
LACTATE SERPL-SCNC: 1.7 MMOL/L — SIGNIFICANT CHANGE UP (ref 0.7–2)
LACTATE SERPL-SCNC: 2.7 MMOL/L — HIGH (ref 0.7–2)
LYMPHOCYTES # BLD AUTO: 1.08 K/UL — SIGNIFICANT CHANGE UP (ref 1–3.3)
LYMPHOCYTES # BLD AUTO: 9.2 % — LOW (ref 13–44)
MCHC RBC-ENTMCNC: 30 PG — SIGNIFICANT CHANGE UP (ref 27–34)
MCHC RBC-ENTMCNC: 32.4 GM/DL — SIGNIFICANT CHANGE UP (ref 32–36)
MCV RBC AUTO: 92.7 FL — SIGNIFICANT CHANGE UP (ref 80–100)
MONOCYTES # BLD AUTO: 1.25 K/UL — HIGH (ref 0–0.9)
MONOCYTES NFR BLD AUTO: 10.7 % — SIGNIFICANT CHANGE UP (ref 2–14)
NEUTROPHILS # BLD AUTO: 9.15 K/UL — HIGH (ref 1.8–7.4)
NEUTROPHILS NFR BLD AUTO: 78.4 % — HIGH (ref 43–77)
NT-PROBNP SERPL-SCNC: 873 PG/ML — HIGH (ref 0–450)
PCO2 BLDV: 40 MMHG — SIGNIFICANT CHANGE UP (ref 39–42)
PH BLDV: 7.37 — SIGNIFICANT CHANGE UP (ref 7.32–7.43)
PLATELET # BLD AUTO: 251 K/UL — SIGNIFICANT CHANGE UP (ref 150–400)
PO2 BLDV: 130 MMHG — HIGH (ref 25–45)
POTASSIUM SERPL-MCNC: 4.2 MMOL/L — SIGNIFICANT CHANGE UP (ref 3.5–5.3)
POTASSIUM SERPL-SCNC: 4.2 MMOL/L — SIGNIFICANT CHANGE UP (ref 3.5–5.3)
PROT SERPL-MCNC: 7.7 GM/DL — SIGNIFICANT CHANGE UP (ref 6–8.3)
PROTHROM AB SERPL-ACNC: 29.6 SEC — HIGH (ref 9.5–13)
RBC # BLD: 4.1 M/UL — SIGNIFICANT CHANGE UP (ref 3.8–5.2)
RBC # FLD: 14.9 % — HIGH (ref 10.3–14.5)
SAO2 % BLDV: 100 % — HIGH (ref 67–88)
SODIUM SERPL-SCNC: 135 MMOL/L — SIGNIFICANT CHANGE UP (ref 135–145)
TROPONIN I, HIGH SENSITIVITY RESULT: 5.44 NG/L — SIGNIFICANT CHANGE UP
WBC # BLD: 11.68 K/UL — HIGH (ref 3.8–10.5)
WBC # FLD AUTO: 11.68 K/UL — HIGH (ref 3.8–10.5)

## 2024-07-22 PROCEDURE — 82803 BLOOD GASES ANY COMBINATION: CPT

## 2024-07-22 PROCEDURE — 99285 EMERGENCY DEPT VISIT HI MDM: CPT

## 2024-07-22 PROCEDURE — 93005 ELECTROCARDIOGRAM TRACING: CPT

## 2024-07-22 PROCEDURE — 36415 COLL VENOUS BLD VENIPUNCTURE: CPT

## 2024-07-22 PROCEDURE — 71045 X-RAY EXAM CHEST 1 VIEW: CPT | Mod: 26

## 2024-07-22 PROCEDURE — 71045 X-RAY EXAM CHEST 1 VIEW: CPT

## 2024-07-22 PROCEDURE — 84484 ASSAY OF TROPONIN QUANT: CPT

## 2024-07-22 PROCEDURE — 85025 COMPLETE CBC W/AUTO DIFF WBC: CPT

## 2024-07-22 PROCEDURE — 80053 COMPREHEN METABOLIC PANEL: CPT

## 2024-07-22 PROCEDURE — 99285 EMERGENCY DEPT VISIT HI MDM: CPT | Mod: 25

## 2024-07-22 PROCEDURE — 85610 PROTHROMBIN TIME: CPT

## 2024-07-22 PROCEDURE — 96374 THER/PROPH/DIAG INJ IV PUSH: CPT

## 2024-07-22 PROCEDURE — 83880 ASSAY OF NATRIURETIC PEPTIDE: CPT

## 2024-07-22 PROCEDURE — 85730 THROMBOPLASTIN TIME PARTIAL: CPT

## 2024-07-22 PROCEDURE — 93010 ELECTROCARDIOGRAM REPORT: CPT

## 2024-07-22 PROCEDURE — 83605 ASSAY OF LACTIC ACID: CPT | Mod: 91

## 2024-07-22 RX ORDER — FUROSEMIDE 10 MG/ML
80 INJECTION, SOLUTION INTRAMUSCULAR; INTRAVENOUS ONCE
Refills: 0 | Status: COMPLETED | OUTPATIENT
Start: 2024-07-22 | End: 2024-07-22

## 2024-07-22 RX ORDER — SODIUM CHLORIDE 0.9 % (FLUSH) 0.9 %
250 SYRINGE (ML) INJECTION ONCE
Refills: 0 | Status: COMPLETED | OUTPATIENT
Start: 2024-07-22 | End: 2024-07-22

## 2024-07-22 RX ADMIN — Medication 250 MILLILITER(S): at 12:31

## 2024-07-22 RX ADMIN — FUROSEMIDE 80 MILLIGRAM(S): 10 INJECTION, SOLUTION INTRAMUSCULAR; INTRAVENOUS at 14:53

## 2024-07-22 NOTE — ED PROVIDER NOTE - PROGRESS NOTE DETAILS
Spoke to Dr. Mayer patient's cardiologist this, pt getting tavr on 8/4/24, give lasix 80mg iv and then dc BLAIRE Manning DO

## 2024-07-22 NOTE — ED ADULT NURSE NOTE - OBJECTIVE STATEMENT
Pt is a 73 y/o female, alert and oriented, presenting to ED with shortness of breath and chest discomfort since yesterday. Pt reports, "I have chest pain and upper back pain when I take a breathe. I feel very short of breath when I lay flat and when I stand/walk. I am also nauseous" Pt reports cardioversion June 27th in [place] and then had 2 cardiac stents placed June 30th. Pt scheduled for aortic valve replacement in upcoming weeks. Pt reports taking blood thinners. Pt denies vomiting, dizziness, fevers, recent falls.     20G IV placed on left wrist and labs sent. Pt placed on cardiac monitor. Pt resting comfortably in bed sitting upwards. Safety maintained.

## 2024-07-22 NOTE — ED PROVIDER NOTE - PATIENT PORTAL LINK FT
You can access the FollowMyHealth Patient Portal offered by [facility] by registering at the following website: [email or web address]. By joining [email or web address]’s FollowMyHealth portal, you will also be able to view your health information using other applications (apps) compatible with our system.

## 2024-07-22 NOTE — ED PROVIDER NOTE - OBJECTIVE STATEMENT
75 yo female w/PMHx cellulitis, HTN, thyroid nodule, neuropathy, CHF, Afib, and DM2 presents to the ED with SOB and pain on inspiration. Pt had a stent and a cardioversion done on June 25th and now is having a TAVR on August 4th with Dr. Nunez.

## 2024-07-22 NOTE — ED ADULT NURSE NOTE - NSFALLHARMRISKINTERV_ED_ALL_ED

## 2024-07-22 NOTE — ED PROVIDER NOTE - PHYSICAL EXAMINATION
Gen:  Well appearing in NAD  Head:  NC/AT  HEENT: pupils perrl,no pharyngeal erythema, uvula midline  Cardiac: S1S2, RRR  Abd: Soft, non tender  Resp: No distress, CTA   musculoskeletal:: no deformities, no swelling, strength +5/+5  Skin: warm and dry as visualized, no rashes  Neuro: CAPPS, aao x 4  Psych:alert, cooperative, appropriate mood and affect for situation

## 2024-07-22 NOTE — ED ADULT NURSE NOTE - CHIEF COMPLAINT QUOTE
Pt comes to ED complaining of shortness of breath. Pt unable to speak without pausing to breathe. Pt with hx of cardioversion June 27th in [place] and then had 2 stents placed June 30th. Pt O2 sat at triage 92% on RA.

## 2024-07-22 NOTE — PHYSICAL THERAPY INITIAL EVALUATION ADULT - SIT-TO-STAND BALANCE
Patient discharged with Dad at this time. Discharge instructions reviewed with patient and Dad at bedside. All questions answered and reviewed with the teachback method.    fair balance

## 2024-07-22 NOTE — ED ADULT TRIAGE NOTE - CHIEF COMPLAINT QUOTE
Pt comes to ED complaining of shortness of breath. Pt unable to speak without pausing to breathe. Pt with hx of cardioversion June 27th in [place] and then had 2 stents placed June 30th. Pt O2 sat at triage 92% on RA.

## 2024-08-19 ENCOUNTER — EMERGENCY (EMERGENCY)
Facility: HOSPITAL | Age: 74
LOS: 0 days | Discharge: ANOTHER HOSPITAL | End: 2024-08-19
Attending: EMERGENCY MEDICINE
Payer: MEDICARE

## 2024-08-19 ENCOUNTER — RESULT REVIEW (OUTPATIENT)
Age: 74
End: 2024-08-19

## 2024-08-19 VITALS
WEIGHT: 255.96 LBS | HEIGHT: 69 IN | RESPIRATION RATE: 20 BRPM | DIASTOLIC BLOOD PRESSURE: 61 MMHG | SYSTOLIC BLOOD PRESSURE: 117 MMHG | TEMPERATURE: 98 F | OXYGEN SATURATION: 100 % | HEART RATE: 78 BPM

## 2024-08-19 VITALS
HEART RATE: 73 BPM | DIASTOLIC BLOOD PRESSURE: 83 MMHG | SYSTOLIC BLOOD PRESSURE: 110 MMHG | OXYGEN SATURATION: 100 % | RESPIRATION RATE: 20 BRPM | TEMPERATURE: 98 F

## 2024-08-19 DIAGNOSIS — I11.0 HYPERTENSIVE HEART DISEASE WITH HEART FAILURE: ICD-10-CM

## 2024-08-19 DIAGNOSIS — Z79.01 LONG TERM (CURRENT) USE OF ANTICOAGULANTS: ICD-10-CM

## 2024-08-19 DIAGNOSIS — Z91.040 LATEX ALLERGY STATUS: ICD-10-CM

## 2024-08-19 DIAGNOSIS — Z90.49 ACQUIRED ABSENCE OF OTHER SPECIFIED PARTS OF DIGESTIVE TRACT: Chronic | ICD-10-CM

## 2024-08-19 DIAGNOSIS — E11.40 TYPE 2 DIABETES MELLITUS WITH DIABETIC NEUROPATHY, UNSPECIFIED: ICD-10-CM

## 2024-08-19 DIAGNOSIS — R79.1 ABNORMAL COAGULATION PROFILE: ICD-10-CM

## 2024-08-19 DIAGNOSIS — I25.10 ATHEROSCLEROTIC HEART DISEASE OF NATIVE CORONARY ARTERY WITHOUT ANGINA PECTORIS: ICD-10-CM

## 2024-08-19 DIAGNOSIS — Z98.49 CATARACT EXTRACTION STATUS, UNSPECIFIED EYE: Chronic | ICD-10-CM

## 2024-08-19 DIAGNOSIS — I50.32 CHRONIC DIASTOLIC (CONGESTIVE) HEART FAILURE: ICD-10-CM

## 2024-08-19 DIAGNOSIS — R06.02 SHORTNESS OF BREATH: ICD-10-CM

## 2024-08-19 DIAGNOSIS — K95.09 OTHER COMPLICATIONS OF GASTRIC BAND PROCEDURE: Chronic | ICD-10-CM

## 2024-08-19 DIAGNOSIS — Z98.890 OTHER SPECIFIED POSTPROCEDURAL STATES: Chronic | ICD-10-CM

## 2024-08-19 DIAGNOSIS — Z91.041 RADIOGRAPHIC DYE ALLERGY STATUS: ICD-10-CM

## 2024-08-19 DIAGNOSIS — I48.91 UNSPECIFIED ATRIAL FIBRILLATION: ICD-10-CM

## 2024-08-19 DIAGNOSIS — Z88.0 ALLERGY STATUS TO PENICILLIN: ICD-10-CM

## 2024-08-19 DIAGNOSIS — E11.51 TYPE 2 DIABETES MELLITUS WITH DIABETIC PERIPHERAL ANGIOPATHY WITHOUT GANGRENE: ICD-10-CM

## 2024-08-19 DIAGNOSIS — I31.2 HEMOPERICARDIUM, NOT ELSEWHERE CLASSIFIED: ICD-10-CM

## 2024-08-19 DIAGNOSIS — R06.00 DYSPNEA, UNSPECIFIED: ICD-10-CM

## 2024-08-19 LAB
ANION GAP SERPL CALC-SCNC: 8 MMOL/L — SIGNIFICANT CHANGE UP (ref 5–17)
APTT BLD: 77.3 SEC — HIGH (ref 24.5–35.6)
BASOPHILS # BLD AUTO: 0.06 K/UL — SIGNIFICANT CHANGE UP (ref 0–0.2)
BASOPHILS # BLD AUTO: 0.08 K/UL — SIGNIFICANT CHANGE UP (ref 0–0.2)
BASOPHILS NFR BLD AUTO: 0.6 % — SIGNIFICANT CHANGE UP (ref 0–2)
BASOPHILS NFR BLD AUTO: 0.8 % — SIGNIFICANT CHANGE UP (ref 0–2)
BLD GP AB SCN SERPL QL: SIGNIFICANT CHANGE UP
BUN SERPL-MCNC: 31 MG/DL — HIGH (ref 7–23)
CALCIUM SERPL-MCNC: 8.9 MG/DL — SIGNIFICANT CHANGE UP (ref 8.5–10.1)
CHLORIDE SERPL-SCNC: 107 MMOL/L — SIGNIFICANT CHANGE UP (ref 96–108)
CO2 SERPL-SCNC: 19 MMOL/L — LOW (ref 22–31)
CREAT SERPL-MCNC: 1.38 MG/DL — HIGH (ref 0.5–1.3)
EGFR: 40 ML/MIN/1.73M2 — LOW
EOSINOPHIL # BLD AUTO: 0.02 K/UL — SIGNIFICANT CHANGE UP (ref 0–0.5)
EOSINOPHIL # BLD AUTO: 0.07 K/UL — SIGNIFICANT CHANGE UP (ref 0–0.5)
EOSINOPHIL NFR BLD AUTO: 0.2 % — SIGNIFICANT CHANGE UP (ref 0–6)
EOSINOPHIL NFR BLD AUTO: 0.7 % — SIGNIFICANT CHANGE UP (ref 0–6)
GLUCOSE SERPL-MCNC: 99 MG/DL — SIGNIFICANT CHANGE UP (ref 70–99)
HCT VFR BLD CALC: 24.6 % — LOW (ref 34.5–45)
HCT VFR BLD CALC: 25.4 % — LOW (ref 34.5–45)
HGB BLD-MCNC: 7.7 G/DL — LOW (ref 11.5–15.5)
HGB BLD-MCNC: 8 G/DL — LOW (ref 11.5–15.5)
IMM GRANULOCYTES NFR BLD AUTO: 0.5 % — SIGNIFICANT CHANGE UP (ref 0–0.9)
IMM GRANULOCYTES NFR BLD AUTO: 0.6 % — SIGNIFICANT CHANGE UP (ref 0–0.9)
INR BLD: 14.14 RATIO — CRITICAL HIGH (ref 0.85–1.18)
INR BLD: 2.12 RATIO — HIGH (ref 0.85–1.18)
LYMPHOCYTES # BLD AUTO: 1.12 K/UL — SIGNIFICANT CHANGE UP (ref 1–3.3)
LYMPHOCYTES # BLD AUTO: 1.56 K/UL — SIGNIFICANT CHANGE UP (ref 1–3.3)
LYMPHOCYTES # BLD AUTO: 10.9 % — LOW (ref 13–44)
LYMPHOCYTES # BLD AUTO: 14.5 % — SIGNIFICANT CHANGE UP (ref 13–44)
MCHC RBC-ENTMCNC: 29.4 PG — SIGNIFICANT CHANGE UP (ref 27–34)
MCHC RBC-ENTMCNC: 29.4 PG — SIGNIFICANT CHANGE UP (ref 27–34)
MCHC RBC-ENTMCNC: 31.3 GM/DL — LOW (ref 32–36)
MCHC RBC-ENTMCNC: 31.5 GM/DL — LOW (ref 32–36)
MCV RBC AUTO: 93.4 FL — SIGNIFICANT CHANGE UP (ref 80–100)
MCV RBC AUTO: 93.9 FL — SIGNIFICANT CHANGE UP (ref 80–100)
MONOCYTES # BLD AUTO: 0.96 K/UL — HIGH (ref 0–0.9)
MONOCYTES # BLD AUTO: 1.17 K/UL — HIGH (ref 0–0.9)
MONOCYTES NFR BLD AUTO: 10.9 % — SIGNIFICANT CHANGE UP (ref 2–14)
MONOCYTES NFR BLD AUTO: 9.3 % — SIGNIFICANT CHANGE UP (ref 2–14)
NEUTROPHILS # BLD AUTO: 7.84 K/UL — HIGH (ref 1.8–7.4)
NEUTROPHILS # BLD AUTO: 8.07 K/UL — HIGH (ref 1.8–7.4)
NEUTROPHILS NFR BLD AUTO: 72.8 % — SIGNIFICANT CHANGE UP (ref 43–77)
NEUTROPHILS NFR BLD AUTO: 78.2 % — HIGH (ref 43–77)
NT-PROBNP SERPL-SCNC: 4307 PG/ML — HIGH (ref 0–450)
PLATELET # BLD AUTO: 524 K/UL — HIGH (ref 150–400)
PLATELET # BLD AUTO: 537 K/UL — HIGH (ref 150–400)
POTASSIUM SERPL-MCNC: 5.8 MMOL/L — HIGH (ref 3.5–5.3)
POTASSIUM SERPL-SCNC: 5.8 MMOL/L — HIGH (ref 3.5–5.3)
PROTHROM AB SERPL-ACNC: 147.9 SEC — HIGH (ref 9.5–13)
PROTHROM AB SERPL-ACNC: 23.4 SEC — HIGH (ref 9.5–13)
RBC # BLD: 2.62 M/UL — LOW (ref 3.8–5.2)
RBC # BLD: 2.72 M/UL — LOW (ref 3.8–5.2)
RBC # FLD: 15.2 % — HIGH (ref 10.3–14.5)
RBC # FLD: 15.4 % — HIGH (ref 10.3–14.5)
SODIUM SERPL-SCNC: 134 MMOL/L — LOW (ref 135–145)
TROPONIN I, HIGH SENSITIVITY RESULT: 11.72 NG/L — SIGNIFICANT CHANGE UP
WBC # BLD: 10.31 K/UL — SIGNIFICANT CHANGE UP (ref 3.8–10.5)
WBC # BLD: 10.75 K/UL — HIGH (ref 3.8–10.5)
WBC # FLD AUTO: 10.31 K/UL — SIGNIFICANT CHANGE UP (ref 3.8–10.5)
WBC # FLD AUTO: 10.75 K/UL — HIGH (ref 3.8–10.5)

## 2024-08-19 PROCEDURE — 84484 ASSAY OF TROPONIN QUANT: CPT

## 2024-08-19 PROCEDURE — 93005 ELECTROCARDIOGRAM TRACING: CPT

## 2024-08-19 PROCEDURE — 93321 DOPPLER ECHO F-UP/LMTD STD: CPT | Mod: 26

## 2024-08-19 PROCEDURE — 99291 CRITICAL CARE FIRST HOUR: CPT

## 2024-08-19 PROCEDURE — 71045 X-RAY EXAM CHEST 1 VIEW: CPT

## 2024-08-19 PROCEDURE — 85610 PROTHROMBIN TIME: CPT

## 2024-08-19 PROCEDURE — 71275 CT ANGIOGRAPHY CHEST: CPT | Mod: 26,MC

## 2024-08-19 PROCEDURE — 96375 TX/PRO/DX INJ NEW DRUG ADDON: CPT

## 2024-08-19 PROCEDURE — 93010 ELECTROCARDIOGRAM REPORT: CPT

## 2024-08-19 PROCEDURE — 93321 DOPPLER ECHO F-UP/LMTD STD: CPT

## 2024-08-19 PROCEDURE — 96374 THER/PROPH/DIAG INJ IV PUSH: CPT | Mod: XU

## 2024-08-19 PROCEDURE — 86850 RBC ANTIBODY SCREEN: CPT

## 2024-08-19 PROCEDURE — 93308 TTE F-UP OR LMTD: CPT | Mod: 26

## 2024-08-19 PROCEDURE — 93325 DOPPLER ECHO COLOR FLOW MAPG: CPT | Mod: 26

## 2024-08-19 PROCEDURE — 71275 CT ANGIOGRAPHY CHEST: CPT | Mod: MC

## 2024-08-19 PROCEDURE — 93325 DOPPLER ECHO COLOR FLOW MAPG: CPT

## 2024-08-19 PROCEDURE — 85730 THROMBOPLASTIN TIME PARTIAL: CPT

## 2024-08-19 PROCEDURE — 36415 COLL VENOUS BLD VENIPUNCTURE: CPT

## 2024-08-19 PROCEDURE — 71045 X-RAY EXAM CHEST 1 VIEW: CPT | Mod: 26

## 2024-08-19 PROCEDURE — 93308 TTE F-UP OR LMTD: CPT

## 2024-08-19 PROCEDURE — 83880 ASSAY OF NATRIURETIC PEPTIDE: CPT

## 2024-08-19 PROCEDURE — 86901 BLOOD TYPING SEROLOGIC RH(D): CPT

## 2024-08-19 PROCEDURE — 85025 COMPLETE CBC W/AUTO DIFF WBC: CPT

## 2024-08-19 PROCEDURE — 86900 BLOOD TYPING SEROLOGIC ABO: CPT

## 2024-08-19 PROCEDURE — 80048 BASIC METABOLIC PNL TOTAL CA: CPT

## 2024-08-19 RX ORDER — PROTHROMBIN COMPLEX CONCENTRATE (HUMAN) 25.5; 16.5; 24; 22; 22; 26 [IU]/ML; [IU]/ML; [IU]/ML; [IU]/ML; [IU]/ML; [IU]/ML
1500 POWDER, FOR SOLUTION INTRAVENOUS ONCE
Refills: 0 | Status: COMPLETED | OUTPATIENT
Start: 2024-08-19 | End: 2024-08-19

## 2024-08-19 RX ORDER — PHYTONADIONE 10 MG/ML
10 INJECTION, EMULSION INTRAMUSCULAR; INTRAVENOUS; SUBCUTANEOUS ONCE
Refills: 0 | Status: COMPLETED | OUTPATIENT
Start: 2024-08-19 | End: 2024-08-19

## 2024-08-19 RX ORDER — LORAZEPAM 1 MG/1
1 TABLET ORAL ONCE
Refills: 0 | Status: DISCONTINUED | OUTPATIENT
Start: 2024-08-19 | End: 2024-08-19

## 2024-08-19 RX ADMIN — PHYTONADIONE 102 MILLIGRAM(S): 10 INJECTION, EMULSION INTRAMUSCULAR; INTRAVENOUS; SUBCUTANEOUS at 19:17

## 2024-08-19 RX ADMIN — PROTHROMBIN COMPLEX CONCENTRATE (HUMAN) 400 INTERNATIONAL UNIT(S): 25.5; 16.5; 24; 22; 22; 26 POWDER, FOR SOLUTION INTRAVENOUS at 18:58

## 2024-08-19 RX ADMIN — LORAZEPAM 1 MILLIGRAM(S): 1 TABLET ORAL at 22:20

## 2024-08-19 NOTE — CONSULT NOTE ADULT - ASSESSMENT
74y Female PMHx of HTN, DM2, AS, CAD, PVD, HFpEF (55%) Afib on Coumadin s/p ablation 2023, CHF severe AS s/p TAVR at [facility] 2 weeks ago admitted to  w c/o SOB found to have a large pericardial effusion and INR of 14.    stat ECHO  medical admit to CCU, close monitering  reverse INR, no AC  urgent interventional cardiology consultation  urgent cardiology consult  recommend drain placement w cardiology  NPO  will follow  D/w Dr. aGrcia

## 2024-08-19 NOTE — ED PROVIDER NOTE - PROGRESS NOTE DETAILS
aware of ct findings Evaluated patient at bedside cardiac POCUS ultrasound showing pericardial effusion blood pressure stable not hypoxic however very symptomatic concern for hemorrhagic component with INR grossly elevated call out to patient's cardiology group will likely need stat echo cards input cardiothoracic input continue to follow closely Spoke with cardiothoracic PA coming to evaluate cardiologist on page awaiting callback I have arranged for emergent TTE spoke with ultrasound they are coming to bedside Spoke with MICU PA coming to evaluate patient

## 2024-08-19 NOTE — CONSULT NOTE ADULT - SUBJECTIVE AND OBJECTIVE BOX
History of Present Illness:  74y Female PMHx of HTN, DM2, AS, CAD, PVD, neuropathy, obese, HFpEF (55%) Afib on Coumadin s/p ablation 2023, CHF severe AS s/p TAVR at [facility] 2 weeks ago admitted to  w c/o SOB found to have a large pericardial effusion and INR of 14.   Pt seen at bedside, on RA. Tachypnic and c/o trouble breathing. Denies chest pain. Denies light headed, dizzy feeling w ambulation. Unable to ambulate due to SOB. Denies fevers. Not eating due to SOB.    PMH/PSH:  Diabetes mellitus type II, controlled    Atrial fibrillation    Congestive heart failure    Dyspnea    Morbid obesity with BMI of 40.0-44.9, adult    Neuropathy    Peripheral venous insufficiency    Thyroid nodule    HTN (hypertension)    Cellulitis    At risk for sleep apnea      History of esophagogastroduodenoscopy (EGD)    H/O colonoscopy    Other complications of gastric band procedure    S/P left knee arthroscopy    Status post medial meniscus repair    History of cholecystectomy    S/P cataract surgery        Relevant Family History  FAMILY HISTORY:  Family history of breast cancer in mother    Family history of diabetes mellitus in mother    FHx: heart disease (Sibling)        SOCIAL HISTORY:  Smoker: [ ] Yes  [x ] No   quit 30 years ago  ETOH use: [ ] Yes  [x ] No              FREQUENCY / QUANTITY:  Ilicit Drug use:  [ ] Yes  [x ] No  lives w  and dtr    MEDICATIONS  (STANDING):  phytonadione  IVPB 10 milliGRAM(s) IV Intermittent once  prothrombin complex concentrate IVPB (KCENTRA) 1500 International Unit(s) IV Intermittent once    MEDICATIONS  (PRN):      Allergies: PC Pen VK (Rash)  latex (Unknown)  penicillin (Hives; Urticaria)  [This allergen will not trigger allergy alert] IV DYE, IODINE CONTRAST (Unknown)                                                            LABS:                        8.0    10.31 )-----------( 537      ( 19 Aug 2024 16:27 )             25.4     08-19    134<L>  |  107  |  31<H>  ----------------------------<  99  5.8<H>   |  19<L>  |  1.38<H>    Ca    8.9      19 Aug 2024 16:27      PT/INR - ( 19 Aug 2024 16:27 )   PT: 147.9 sec;   INR: 14.14 ratio         PTT - ( 19 Aug 2024 16:27 )  PTT:77.3 sec  Urinalysis Basic - ( 19 Aug 2024 16:27 )    Color: x / Appearance: x / SG: x / pH: x  Gluc: 99 mg/dL / Ketone: x  / Bili: x / Urobili: x   Blood: x / Protein: x / Nitrite: x   Leuk Esterase: x / RBC: x / WBC x   Sq Epi: x / Non Sq Epi: x / Bacteria: x    < from: CT Angio Chest PE Protocol w/ IV Cont (08.19.24 @ 16:56) >    FINDINGS:    PULMONARY VESSELS: No main or lobar pulmonary embolus. The segmental and   subsegmental branches are not evaluated secondary to motion. Dilated   pulmonary artery measuring 2.6 cm suggestive of pulmonary hypertension    HEART AND VASCULATURE: Cardiomegaly. TAVR. Large pericardial effusion   compressing the right atrium. Mitral annulus, coronary, and aortic   calcification.    LUNGS, AIRWAYS, PLEURA: Patent central airways. Clear lungs. Small   layering pleural effusions.    MEDIASTINUM: No mass or lymphadenopathy.    UPPER ABDOMEN: Arterial calcifications.    BONES AND SOFT TISSUES: No aggressive osseous lesion. Osteoporosis.    LOWER NECK: Subcentimeter calcified thyroid nodule    IMPRESSION:    No main or lobar pulmonary embolus. The segmental and subsegmental   branches are not evaluated secondary to motion    Large pericardial effusion compressing the right atrium.    < end of copied text >              Review of Systems         as per HPI    T(C): 36.8 (08-19-24 @ 15:30), Max: 36.8 (08-19-24 @ 15:30)  HR: 74 (08-19-24 @ 16:35) (74 - 78)  BP: 98/62 (08-19-24 @ 16:35) (98/62 - 117/61)  ABP: --  ABP(mean): --  RR: 22 (08-19-24 @ 16:35) (20 - 22)  SpO2: 100% (08-19-24 @ 16:35) (100% - 100%)      Physical Exam  General: anxious                                                         Neuro: A+O x 3,                    Eyes: PERRL, EOMI   ENT: Normal exam of nasal/oral mucosa with absence of cyanosis.   Neck:  JVD, trachea midline   Chest: equal bilaterally, tachypnic, + accessory muscle use note  CV: RRR,   GI: soft, NT, ND, obese  Extremities: CAPPS x 4, no C/C/E, distal motor/neuro/circ intact  SKIN: warm, dry, intact

## 2024-08-19 NOTE — CONSULT NOTE ADULT - NS ATTEND AMEND GEN_ALL_CORE FT
Agree with above. in short this is a 74 year old lady with TAVR 2 weeks prior with Dr. Nunez at [place]. CT and TTE reviewed. Large pericardial effuson present, loculated with compression of Ra. Etiology ranges from slow annular hematoma, contained rupture to TVP intraprocedure now worsened with coagulopathy. Dedicated imaging with CTA heart tavr protocol or ASHLEY will be able to elucidate this. While stable, recommend transfer to [place] under Dr. Nunez who was spoken to given recent procedure. Likely to require window. At this time, given clinical hemodynamic stability, transfer to be initiated now.     Thank you for allowing me to participate in the care of your patient. Feel free to contact me if any clinical issues arise via contact information below or MS Teams.    Terell Summers MD, FAC, [facility]   Director, Interventional Cardiology, [address], [address] [place] [address], [address] Office: [phone]  Post Office: [phone]  Email: [email or web address]

## 2024-08-19 NOTE — ED ADULT NURSE NOTE - OBJECTIVE STATEMENT
Pt is a 75 y/o female, alert and oriented x3, presenting to ED with difficulty breathing. Pt reports, "Ever since my aortic valve replacement at [facility] 2 weeks ago, I've been short of breath, weak, and fatigued. My lungs hurt when I take a deep breathe and I cannot lay flat. I feel winded fast when I walk. I went to my 2 week valve replacement follow-up appt today and they told me my INR was too high and to stop my Warfarin." Pt denies chest pain, dizziness, fevers, recent travel, nausea, vomiting. Pt was placed on NC upon hospital arrival, but MD Davenport at bedside removed pt from o2. Pt remains at 100% o2 sat on room air.   Pt placed on cardiac monitor.

## 2024-08-19 NOTE — CONSULT NOTE ADULT - CRITICAL CARE ATTENDING COMMENT
Agree with above. in short this is a 74 year old lady with TAVR 2 weeks prior with Dr. Nunez at [place]. CT and TTE reviewed. Large pericardial effuson present, loculated with compression of Ra. Etiology ranges from slow annular hematoma, contained rupture to TVP intraprocedure now worsened with coagulopathy. Dedicated imaging with CTA heart tavr protocol or ASHLEY will be able to elucidate this. While stable, recommend transfer to [place] under Dr. Nunez who was spoken to given recent procedure. Likely to require window. At this time, given clinical hemodynamic stability, transfer to be initiated now. If any hemodynamic compromise, will need tap, with current reversal agents in use.     Thank you for allowing me to participate in the care of your patient. Feel free to contact me if any clinical issues arise via contact information below or MS Teams.    Terell Summers MD, FACC, [facility]   Director, Interventional Cardiology, [address], [facility] Floor, [address] Office: [phone]  [place] Office: [phone]  Email: [email or web address]

## 2024-08-19 NOTE — ED ADULT TRIAGE NOTE - TEMPERATURE IN CELSIUS (DEGREES C)
BLOOD PRESSURE: 136/60 P 101    DATE OF LAST PAP or ANNUAL EXAM: No results found for this basename: pap  URINE HCG:negative    The following medication was given:     MEDICATION: Depo Provera 150mg  ROUTE: IM  SITE: LUQ - Gluteus  : [place]  LOT #: [id number]  NDC#:[address]-2  EXPIRATION:08/2019  NEXT INJECTION DUE: Apr7-Apr 21  Provider: Dr. Devin Mckee MA      
36.8

## 2024-08-19 NOTE — CONSULT NOTE ADULT - SUBJECTIVE AND OBJECTIVE BOX
Department of Cardiology                                                               Division of Interventional Cardiology                                                               [facility] /[address] [address]                                                                                 [phone]             Interventional Cardiology Consult     Patient is a 74y old  Female who presents with a chief complaint of SOB    Interventional cardiology consulted for large pericardial effusion     HPI:  74y Female PMHx of HTN, DM2, CAD, PVD, neuropathy, obese, HFpEF (55%), Afib on Coumadin s/p ablation 2023, severe AS, s/p TAVR at [facility] 2 weeks ago admitted to  w c/o SOB. Pt reports having experiencing of SOB for a long time due to AS and TAVR was performed on 8/2/24 at [facility] and discharge few days after the procedure. Since then, she is still having SOB, seems not getting any better after the procedure and one episode of pain on her Lt sided chest.   Pt was seen at Dr. Mayer (her cardiologist)'s office and noted INR is high, was sent to ED for further evaluation.   In ED,  H/H: 8.0/25.4,--> 7.7/24.6, INR: 14.14. large pericardial effusion on CT chest.   Pt seen at bedside, + SOB with labored breathing, O2 sat 100% on 2.5 L NC, BP: 110s/70s, HR; 80s RA.  Denies chest pain. Denies light headed, dizzy feeling w ambulation. Unable to ambulate due to SOB. Denies fevers. Not eating due to SOB.  Bedside Echo confirmed large pericardial effusion with evidence of hemodynamic compromise (or echocardiographic evidence of cardiac tamponade).       Vital Signs  Vital Signs Last 24 Hrs  T(C): 36.8 (19 Aug 2024 15:30), Max: 36.8 (19 Aug 2024 15:30)  T(F): 98.3 (19 Aug 2024 15:30), Max: 98.3 (19 Aug 2024 15:30)  HR: 74 (19 Aug 2024 16:35) (74 - 78)  BP: 98/62 (19 Aug 2024 16:35) (98/62 - 117/61)  BP(mean): 75 (19 Aug 2024 16:35) (75 - 75)  RR: 22 (19 Aug 2024 16:35) (20 - 22)  SpO2: 100% (19 Aug 2024 16:35) (100% - 100%)  Parameters below as of 19 Aug 2024 16:35  Patient On (Oxygen Delivery Method): room air    Labs:                        7.7    10.75 )-----------( 524      ( 19 Aug 2024 19:24 )             24.6     08-19    134<L>  |  107  |  31<H>  ----------------------------<  99  5.8<H>   |  19<L>  |  1.38<H>    Ca    8.9      19 Aug 2024 16:27    PT/INR - ( 19 Aug 2024 16:27 )   PT: 147.9 sec;   INR: 14.14 ratio      PTT - ( 19 Aug 2024 16:27 )  PTT:77.3 sec    Home Medications:  Acidophilus oral tablet: 1 tab(s) orally once a day (29 Apr 2024 22:44)  albuterol 90 mcg/inh inhalation aerosol: 2 puff(s) inhaled every 6 hours As needed Shortness of Breath and/or Wheezing (07 May 2024 14:58)  cholestyramine 4 g/5 g oral powder for reconstitution: 1 packet(s) orally once a day (in the morning) (29 Apr 2024 22:42)  ezetimibe 10 mg oral tablet: 1 tab(s) orally once a day (in the evening) (29 Apr 2024 22:43)  fluticasone 50 mcg/inh nasal spray: 1 spray(s) nasal 2 times a day (07 May 2024 14:58)  gabapentin 800 mg oral tablet: 1 tab(s) orally 3 times a day (29 Apr 2024 22:44)  losartan 25 mg oral tablet: 1 tab(s) orally once a day (29 Apr 2024 22:43)  metFORMIN 1000 mg oral tablet: 1 tab(s) orally 2 times a day (29 Apr 2024 22:43)  metoprolol succinate 50 mg oral tablet, extended release: 1 tab(s) orally once a day (29 Apr 2024 22:43)  montelukast 10 mg oral tablet: 1 tab(s) orally once a day (at bedtime) (29 Apr 2024 22:43)  pravastatin 10 mg oral tablet: 1 tab(s) orally once a day (in the evening) (29 Apr 2024 22:43)  Vitamin D3 25 mcg (1000 intl units) oral capsule: 1 cap(s) orally once a day (29 Apr 2024 22:44)  warfarin 5 mg oral tablet: 1 tab(s) orally once a day (at bedtime) (29 Apr 2024 22:44)  zolpidem 10 mg oral tablet: 1 tab(s) orally once a day (at bedtime) (29 Apr 2024 22:43)    MEDICATIONS  (STANDING):    MEDICATIONS  (PRN):      PHYSICAL EXAM  GENERAL: NAD, AAOx3  CHEST/LUNG: Clear to auscultation bilaterally; No wheeze  HEART: s1 s2 Regular rate and rhythm  ABDOMEN: Soft, Nontender, Nondistended; Bowel sounds present X 4 quadrants   EXTREMITIES:  + B/l LE edema  Psych: normal affect and behavior, calm and cooperative     PREVIOUS DIAGNOSTIC TESTING    ECHO FINDINGS:  < from: TTE Limited W or WO Ultrasound Enhancing Agent (08.19.24 @ 18:22) >  CONCLUSIONS:      1. Left ventricular systolic function is normal with anejection fraction visually estimated at 50 to 55 %.   2. Partially collapsed right ventricular cavity size.   3. Mild mitral regurgitation.   4. Interventional cardiologist Dr. Terell Summers is aware of TTE results.   5. A Transcatheter deployed (TAVR) valve replacement is present in the aortic position The prosthetic valve is well seated with normal function.   6. Large pericardial effusion with evidence of hemodynamic compromise (or echocardiographic evidence of cardiac tamponade): greater than 30% respiratory variation across the mitral valve E wave, inferior vena cava is plethoric with blunted respiratory variation and early diastolic inversion of the right ventricle.   7. Technically difficult image quality.  < end of copied text >    CT chest < from: CT Angio Chest PE Protocol w/ IV Cont (08.19.24 @ 16:56) >  IMPRESSION:    No main or lobar pulmonary embolus. The segmental and subsegmental   branches are not evaluated secondary to motion    Large pericardial effusion compressing the right atrium.  These findings were discussed with Dr. HERI ERNANDEZ,on 8/19/2024 5:12 PM   with read back.  < end of copied text >    Assessment/ Plan   75yo Female with PMHx of HTN, DM2, CAD, PVD, neuropathy, obese, HFpEF (55%), Afib on Coumadin, s/p ablation 2023, severe AS, s/p recent TAVR at [facility] 2 weeks ago was sent to  ED by outpt cardiologist for high INR and symptoms of SOB. Pt found to have highly elevated INR 14, large pericardial effusion on CT chest and echo (with tamponade physiology). Currently Pt is hemodynamically stable, but high risk of decompensation in given active bleeding.   - Agreed to reverse INR.   - Blood transfusion as needed  - Not candidate for urgent pericardiocentesis at this time due to high risk   - Attempting to contact [facility] (Dr. Nunez) for collaborative care and possible transfer over for continuing pt care as tertiary center. Otherwise, transfer to [facility] for CT consult/ surgical intervention     Discussed the plan with Dr. Summers, Dr. Monzon, Pt and her son Jony (#[phone])                                                                         Department of Cardiology                                                               Division of Interventional Cardiology                                                               [facility] /[address] [address]                                                                                 [phone]             Interventional Cardiology Consult     Patient is a 74y old  Female who presents with a chief complaint of SOB    Interventional cardiology consulted for large pericardial effusion     HPI:  74y Female PMHx of HTN, DM2, CAD, PVD, neuropathy, obese, HFpEF (55%), Afib on Coumadin s/p ablation 2023, severe AS, s/p TAVR at [facility] 2 weeks ago admitted to  w c/o SOB. Pt reports having experiencing of SOB for a long time due to AS and TAVR was performed on 8/2/24 at [facility] and discharge few days after the procedure. Since then, she is still having SOB, seems not getting any better after the procedure and one episode of pain on her Lt sided chest.   Pt was seen at Dr. Mayer (her cardiologist)'s office and noted INR is high, was sent to ED for further evaluation.   In ED,  H/H: 8.0/25.4,--> 7.7/24.6, INR: 14.14. large pericardial effusion on CT chest.   Pt seen at bedside, + SOB with labored breathing, O2 sat 100% on 2.5 L NC, BP: 110s/70s, HR; 80s RA.  Denies chest pain. Denies light headed, dizzy feeling w ambulation. Unable to ambulate due to SOB. Denies fevers. Not eating due to SOB.  Bedside Echo confirmed large pericardial effusion with evidence of echocardiographic cardiac tamponade but clinically stable.       Vital Signs  Vital Signs Last 24 Hrs  T(C): 36.8 (19 Aug 2024 15:30), Max: 36.8 (19 Aug 2024 15:30)  T(F): 98.3 (19 Aug 2024 15:30), Max: 98.3 (19 Aug 2024 15:30)  HR: 74 (19 Aug 2024 16:35) (74 - 78)  BP: 98/62 (19 Aug 2024 16:35) (98/62 - 117/61)  BP(mean): 75 (19 Aug 2024 16:35) (75 - 75)  RR: 22 (19 Aug 2024 16:35) (20 - 22)  SpO2: 100% (19 Aug 2024 16:35) (100% - 100%)  Parameters below as of 19 Aug 2024 16:35  Patient On (Oxygen Delivery Method): room air    Labs:                        7.7    10.75 )-----------( 524      ( 19 Aug 2024 19:24 )             24.6     08-19    134<L>  |  107  |  31<H>  ----------------------------<  99  5.8<H>   |  19<L>  |  1.38<H>    Ca    8.9      19 Aug 2024 16:27    PT/INR - ( 19 Aug 2024 16:27 )   PT: 147.9 sec;   INR: 14.14 ratio      PTT - ( 19 Aug 2024 16:27 )  PTT:77.3 sec    Home Medications:  Acidophilus oral tablet: 1 tab(s) orally once a day (29 Apr 2024 22:44)  albuterol 90 mcg/inh inhalation aerosol: 2 puff(s) inhaled every 6 hours As needed Shortness of Breath and/or Wheezing (07 May 2024 14:58)  cholestyramine 4 g/5 g oral powder for reconstitution: 1 packet(s) orally once a day (in the morning) (29 Apr 2024 22:42)  ezetimibe 10 mg oral tablet: 1 tab(s) orally once a day (in the evening) (29 Apr 2024 22:43)  fluticasone 50 mcg/inh nasal spray: 1 spray(s) nasal 2 times a day (07 May 2024 14:58)  gabapentin 800 mg oral tablet: 1 tab(s) orally 3 times a day (29 Apr 2024 22:44)  losartan 25 mg oral tablet: 1 tab(s) orally once a day (29 Apr 2024 22:43)  metFORMIN 1000 mg oral tablet: 1 tab(s) orally 2 times a day (29 Apr 2024 22:43)  metoprolol succinate 50 mg oral tablet, extended release: 1 tab(s) orally once a day (29 Apr 2024 22:43)  montelukast 10 mg oral tablet: 1 tab(s) orally once a day (at bedtime) (29 Apr 2024 22:43)  pravastatin 10 mg oral tablet: 1 tab(s) orally once a day (in the evening) (29 Apr 2024 22:43)  Vitamin D3 25 mcg (1000 intl units) oral capsule: 1 cap(s) orally once a day (29 Apr 2024 22:44)  warfarin 5 mg oral tablet: 1 tab(s) orally once a day (at bedtime) (29 Apr 2024 22:44)  zolpidem 10 mg oral tablet: 1 tab(s) orally once a day (at bedtime) (29 Apr 2024 22:43)    MEDICATIONS  (STANDING):    MEDICATIONS  (PRN):      PHYSICAL EXAM  GENERAL: NAD, AAOx3  CHEST/LUNG: Clear to auscultation bilaterally; No wheeze  HEART: s1 s2 Regular rate and rhythm  ABDOMEN: Soft, Nontender, Nondistended; Bowel sounds present X 4 quadrants   EXTREMITIES:  + B/l LE edema  Psych: normal affect and behavior, calm and cooperative     PREVIOUS DIAGNOSTIC TESTING    ECHO FINDINGS:  < from: TTE Limited W or WO Ultrasound Enhancing Agent (08.19.24 @ 18:22) >  CONCLUSIONS:      1. Left ventricular systolic function is normal with anejection fraction visually estimated at 50 to 55 %.   2. Partially collapsed right ventricular cavity size.   3. Mild mitral regurgitation.   4. Interventional cardiologist Dr. Terell Summers is aware of TTE results.   5. A Transcatheter deployed (TAVR) valve replacement is present in the aortic position The prosthetic valve is well seated with normal function.   6. Large pericardial effusion with evidence of hemodynamic compromise (or echocardiographic evidence of cardiac tamponade): greater than 30% respiratory variation across the mitral valve E wave, inferior vena cava is plethoric with blunted respiratory variation and early diastolic inversion of the right ventricle.   7. Technically difficult image quality.  < end of copied text >    CT chest < from: CT Angio Chest PE Protocol w/ IV Cont (08.19.24 @ 16:56) >  IMPRESSION:    No main or lobar pulmonary embolus. The segmental and subsegmental   branches are not evaluated secondary to motion    Large pericardial effusion compressing the right atrium.  These findings were discussed with Dr. HERI ERNANDEZ,on 8/19/2024 5:12 PM   with read back.  < end of copied text >    Assessment/ Plan   75yo Female with PMHx of HTN, DM2, CAD, PVD, neuropathy, obese, HFpEF (55%), Afib on Coumadin, s/p ablation 2023, severe AS, s/p recent TAVR at [facility] 2 weeks ago was sent to  ED by outpt cardiologist for high INR and symptoms of SOB. Pt found to have highly elevated INR 14, large pericardial effusion on CT chest and echo (with tamponade physiology). Currently Pt is hemodynamically stable, but high risk of decompensation in given active bleeding.   - Agree to reverse INR.   - Blood transfusion as needed to keep Hb>7  - No plan for pericardiocentesis at this time given coagulopathic and clinically stable  - Attempting to contact [facility] (Dr. Nunez) for collaborative care and possible transfer over for continuing pt care as tertiary center. Otherwise, transfer to [facility] for CT consult/ surgical intervention     Discussed the plan with Dr. Summers, Dr. Monzon, Pt and her son Jony (#[phone])

## 2024-08-19 NOTE — ED PROVIDER NOTE - CRITICAL CARE ATTENDING CONTRIBUTION TO CARE
I have personally provided _50__ minutes of critical care time exclusive of time spent on separately billable procedures. Time includes review of laboratory data, radiology results, discussion with consultants, and monitoring for potential decompensation. Interventions were performed as documented above

## 2024-08-19 NOTE — ED PROVIDER NOTE - CARE PLAN
Principal Discharge DX:	Hemorrhagic pericardial effusion  Secondary Diagnosis:	Dyspnea  Secondary Diagnosis:	Elevated international normalized ratio (INR)   1

## 2024-08-19 NOTE — ED ADULT NURSE NOTE - NSFALLHARMRISKINTERV_ED_ALL_ED

## 2024-08-19 NOTE — ED ADULT TRIAGE NOTE - CHIEF COMPLAINT QUOTE
pt LINH from MD office. pt was seeing cardiologist Dr. Mayer when she started c/o difficulty breathing. SpO2 in triage 100% on room air. pt with hx afib, CHF, valve replacement 2 weeks ago. pt reports difficulty breathing since valve replacement 2 weeks ago. denies chest pain, dizziness, lightheadedness. pt taken for STAT EKG and cardiac monitoring.

## 2024-08-19 NOTE — ED PROVIDER NOTE - OBJECTIVE STATEMENT
74y Female PMHx of HTN, DM2, AS, CAD, PVD, neuropathy, obese, HFpEF (55%) Afib on Coumadin s/p ablation 2023, CHF severe AS s/p TAVR at [facility] 2 weeks ago admitted to  w c/o SOB found to have a large pericardial effusion and INR of 14.   Pt seen at bedside, on RA. Tachypnic and c/o trouble breathing. Denies chest pain. Denies light headed, dizzy feeling w ambulation. Unable to ambulate due to SOB. Denies fevers. Not eating due to SOB. no abd pain

## 2024-08-19 NOTE — ED PROVIDER NOTE - CLINICAL SUMMARY MEDICAL DECISION MAKING FREE TEXT BOX
Case was concluded by speaking to personally interventional cardiologist Dr Summers recomends transfer to primary TAVR surgeon Dr Nunez he spoke with him independently I also spoke with [facility] ED DrCarla Excepted by ED doctor at that facility multiple bedside reassessments performed for hemodynamic monitoring supratherapeutic INR was reversed to prevent further decompensation of likely hemorrhagic pericardial effusion

## 2024-09-04 ENCOUNTER — APPOINTMENT (OUTPATIENT)
Dept: VASCULAR SURGERY | Facility: CLINIC | Age: 74
End: 2024-09-04

## 2024-10-28 RX ORDER — METOPROLOL TARTRATE 100 MG/1
1 TABLET, FILM COATED ORAL
Qty: 30 | Refills: 0 | DISCHARGE
Start: 2024-10-28 | End: 2024-11-26

## 2024-10-28 RX ORDER — ZOLPIDEM TARTRATE 10 MG/1
1 TABLET ORAL
Qty: 14 | Refills: 0 | DISCHARGE
Start: 2024-10-28 | End: 2024-11-10

## 2024-10-28 RX ORDER — FUROSEMIDE 40 MG/1
1 TABLET ORAL
Qty: 30 | Refills: 0 | DISCHARGE
Start: 2024-10-28 | End: 2024-11-26

## 2024-11-08 ENCOUNTER — NON-APPOINTMENT (OUTPATIENT)
Age: 74
End: 2024-11-08

## 2024-11-08 DIAGNOSIS — T14.8XXA OTHER INJURY OF UNSPECIFIED BODY REGION, INITIAL ENCOUNTER: ICD-10-CM

## 2024-11-12 ENCOUNTER — NON-APPOINTMENT (OUTPATIENT)
Age: 74
End: 2024-11-12

## 2024-11-14 ENCOUNTER — APPOINTMENT (OUTPATIENT)
Age: 74
End: 2024-11-14

## 2024-11-14 ENCOUNTER — LABORATORY RESULT (OUTPATIENT)
Age: 74
End: 2024-11-14

## 2024-11-14 DIAGNOSIS — L03.90 CELLULITIS, UNSPECIFIED: ICD-10-CM

## 2024-11-14 DIAGNOSIS — L97.409 NON-PRESSURE CHRONIC ULCER OF UNSPECIFIED HEEL AND MIDFOOT WITH UNSPECIFIED SEVERITY: ICD-10-CM

## 2024-11-14 DIAGNOSIS — B35.1 TINEA UNGUIUM: ICD-10-CM

## 2024-11-14 PROCEDURE — 97597 DBRDMT OPN WND 1ST 20 CM/<: CPT

## 2024-11-14 PROCEDURE — 99204 OFFICE O/P NEW MOD 45 MIN: CPT | Mod: 25

## 2024-11-14 PROCEDURE — 11721 DEBRIDE NAIL 6 OR MORE: CPT | Mod: 59

## 2024-11-14 PROCEDURE — 99214 OFFICE O/P EST MOD 30 MIN: CPT | Mod: 25

## 2024-11-14 RX ORDER — TRAMADOL HYDROCHLORIDE 50 MG/1
50 TABLET, COATED ORAL
Qty: 1 | Refills: 0 | Status: ACTIVE | COMMUNITY
Start: 2024-11-14 | End: 1900-01-01

## 2024-11-14 RX ORDER — TRAMADOL HYDROCHLORIDE 50 MG/1
50 TABLET, COATED ORAL
Qty: 16 | Refills: 0 | Status: COMPLETED | COMMUNITY
Start: 2024-11-14 | End: 2024-11-22

## 2024-11-14 RX ORDER — DOXYCYCLINE HYCLATE 150 MG/1
1 TABLET, COATED ORAL
Refills: 0 | DISCHARGE
Start: 2024-11-14

## 2024-11-14 RX ORDER — DOXYCYCLINE HYCLATE 100 MG/1
100 CAPSULE ORAL
Qty: 26 | Refills: 0 | Status: ACTIVE | COMMUNITY
Start: 2024-11-14 | End: 1900-01-01

## 2024-11-18 ENCOUNTER — INPATIENT (INPATIENT)
Facility: HOSPITAL | Age: 74
LOS: 1 days | Discharge: HOME HEALTH | End: 2024-11-20
Attending: STUDENT IN AN ORGANIZED HEALTH CARE EDUCATION/TRAINING PROGRAM | Admitting: STUDENT IN AN ORGANIZED HEALTH CARE EDUCATION/TRAINING PROGRAM
Payer: MEDICARE

## 2024-11-18 VITALS
RESPIRATION RATE: 20 BRPM | OXYGEN SATURATION: 98 % | SYSTOLIC BLOOD PRESSURE: 138 MMHG | WEIGHT: 237.88 LBS | DIASTOLIC BLOOD PRESSURE: 55 MMHG | TEMPERATURE: 98 F | HEART RATE: 53 BPM

## 2024-11-18 DIAGNOSIS — Z98.890 OTHER SPECIFIED POSTPROCEDURAL STATES: Chronic | ICD-10-CM

## 2024-11-18 DIAGNOSIS — Z90.49 ACQUIRED ABSENCE OF OTHER SPECIFIED PARTS OF DIGESTIVE TRACT: Chronic | ICD-10-CM

## 2024-11-18 DIAGNOSIS — Z98.49 CATARACT EXTRACTION STATUS, UNSPECIFIED EYE: Chronic | ICD-10-CM

## 2024-11-18 DIAGNOSIS — K95.09 OTHER COMPLICATIONS OF GASTRIC BAND PROCEDURE: Chronic | ICD-10-CM

## 2024-11-18 LAB
ALBUMIN SERPL ELPH-MCNC: 3 G/DL — LOW (ref 3.3–5)
ALP SERPL-CCNC: 68 U/L — SIGNIFICANT CHANGE UP (ref 40–120)
ALT FLD-CCNC: 10 U/L — LOW (ref 12–78)
ANION GAP SERPL CALC-SCNC: 3 MMOL/L — LOW (ref 5–17)
APPEARANCE UR: CLEAR — SIGNIFICANT CHANGE UP
APTT BLD: 35.8 SEC — HIGH (ref 24.5–35.6)
AST SERPL-CCNC: 10 U/L — LOW (ref 15–37)
BACTERIA # UR AUTO: ABNORMAL /HPF
BASOPHILS # BLD AUTO: 0.06 K/UL — SIGNIFICANT CHANGE UP (ref 0–0.2)
BASOPHILS NFR BLD AUTO: 0.8 % — SIGNIFICANT CHANGE UP (ref 0–2)
BILIRUB SERPL-MCNC: 0.4 MG/DL — SIGNIFICANT CHANGE UP (ref 0.2–1.2)
BILIRUB UR-MCNC: NEGATIVE — SIGNIFICANT CHANGE UP
BUN SERPL-MCNC: 24 MG/DL — HIGH (ref 7–23)
CALCIUM SERPL-MCNC: 9.5 MG/DL — SIGNIFICANT CHANGE UP (ref 8.5–10.1)
CAST: 0 /LPF — SIGNIFICANT CHANGE UP (ref 0–4)
CHLORIDE SERPL-SCNC: 107 MMOL/L — SIGNIFICANT CHANGE UP (ref 96–108)
CO2 SERPL-SCNC: 25 MMOL/L — SIGNIFICANT CHANGE UP (ref 22–31)
COLOR SPEC: YELLOW — SIGNIFICANT CHANGE UP
CREAT SERPL-MCNC: 0.78 MG/DL — SIGNIFICANT CHANGE UP (ref 0.5–1.3)
CRP SERPL-MCNC: 9.2 MG/ML — HIGH (ref 0–5)
DIFF PNL FLD: ABNORMAL
EGFR: 79 ML/MIN/1.73M2 — SIGNIFICANT CHANGE UP
EOSINOPHIL # BLD AUTO: 0.19 K/UL — SIGNIFICANT CHANGE UP (ref 0–0.5)
EOSINOPHIL NFR BLD AUTO: 2.6 % — SIGNIFICANT CHANGE UP (ref 0–6)
ERYTHROCYTE [SEDIMENTATION RATE] IN BLOOD: 71 MM/HR — HIGH (ref 0–20)
GLUCOSE SERPL-MCNC: 89 MG/DL — SIGNIFICANT CHANGE UP (ref 70–99)
GLUCOSE UR QL: NEGATIVE MG/DL — SIGNIFICANT CHANGE UP
HCT VFR BLD CALC: 26.8 % — LOW (ref 34.5–45)
HGB BLD-MCNC: 8.1 G/DL — LOW (ref 11.5–15.5)
IMM GRANULOCYTES NFR BLD AUTO: 0.1 % — SIGNIFICANT CHANGE UP (ref 0–0.9)
INR BLD: 1.47 RATIO — HIGH (ref 0.85–1.16)
KETONES UR-MCNC: NEGATIVE MG/DL — SIGNIFICANT CHANGE UP
LACTATE SERPL-SCNC: 1.7 MMOL/L — SIGNIFICANT CHANGE UP (ref 0.7–2)
LEUKOCYTE ESTERASE UR-ACNC: ABNORMAL
LIDOCAIN IGE QN: 31 U/L — SIGNIFICANT CHANGE UP (ref 13–75)
LYMPHOCYTES # BLD AUTO: 1.12 K/UL — SIGNIFICANT CHANGE UP (ref 1–3.3)
LYMPHOCYTES # BLD AUTO: 15.1 % — SIGNIFICANT CHANGE UP (ref 13–44)
MCHC RBC-ENTMCNC: 27.7 PG — SIGNIFICANT CHANGE UP (ref 27–34)
MCHC RBC-ENTMCNC: 30.2 G/DL — LOW (ref 32–36)
MCV RBC AUTO: 91.8 FL — SIGNIFICANT CHANGE UP (ref 80–100)
MONOCYTES # BLD AUTO: 0.82 K/UL — SIGNIFICANT CHANGE UP (ref 0–0.9)
MONOCYTES NFR BLD AUTO: 11.1 % — SIGNIFICANT CHANGE UP (ref 2–14)
NEUTROPHILS # BLD AUTO: 5.2 K/UL — SIGNIFICANT CHANGE UP (ref 1.8–7.4)
NEUTROPHILS NFR BLD AUTO: 70.3 % — SIGNIFICANT CHANGE UP (ref 43–77)
NITRITE UR-MCNC: POSITIVE
PH UR: 5 — SIGNIFICANT CHANGE UP (ref 5–8)
PLATELET # BLD AUTO: 369 K/UL — SIGNIFICANT CHANGE UP (ref 150–400)
POTASSIUM SERPL-MCNC: 3.9 MMOL/L — SIGNIFICANT CHANGE UP (ref 3.5–5.3)
POTASSIUM SERPL-SCNC: 3.9 MMOL/L — SIGNIFICANT CHANGE UP (ref 3.5–5.3)
PROT SERPL-MCNC: 7.1 GM/DL — SIGNIFICANT CHANGE UP (ref 6–8.3)
PROT UR-MCNC: NEGATIVE MG/DL — SIGNIFICANT CHANGE UP
PROTHROM AB SERPL-ACNC: 17.3 SEC — HIGH (ref 9.9–13.4)
RBC # BLD: 2.92 M/UL — LOW (ref 3.8–5.2)
RBC # FLD: 16.9 % — HIGH (ref 10.3–14.5)
RBC CASTS # UR COMP ASSIST: 2 /HPF — SIGNIFICANT CHANGE UP (ref 0–4)
SODIUM SERPL-SCNC: 135 MMOL/L — SIGNIFICANT CHANGE UP (ref 135–145)
SP GR SPEC: 1.01 — SIGNIFICANT CHANGE UP (ref 1–1.03)
SQUAMOUS # UR AUTO: 2 /HPF — SIGNIFICANT CHANGE UP (ref 0–5)
UROBILINOGEN FLD QL: 0.2 MG/DL — SIGNIFICANT CHANGE UP (ref 0.2–1)
WBC # BLD: 7.4 K/UL — SIGNIFICANT CHANGE UP (ref 3.8–10.5)
WBC # FLD AUTO: 7.4 K/UL — SIGNIFICANT CHANGE UP (ref 3.8–10.5)
WBC UR QL: 21 /HPF — HIGH (ref 0–5)

## 2024-11-18 PROCEDURE — 99285 EMERGENCY DEPT VISIT HI MDM: CPT

## 2024-11-18 PROCEDURE — 73650 X-RAY EXAM OF HEEL: CPT | Mod: 26,RT

## 2024-11-18 RX ORDER — DIPHENHYDRAMINE HCL 25 MG
25 CAPSULE ORAL ONCE
Refills: 0 | Status: COMPLETED | OUTPATIENT
Start: 2024-11-18 | End: 2024-11-18

## 2024-11-18 RX ORDER — METHYLPREDNISOLONE SOD SUCC 125 MG
80 VIAL (EA) INJECTION ONCE
Refills: 0 | Status: COMPLETED | OUTPATIENT
Start: 2024-11-18 | End: 2024-11-18

## 2024-11-18 RX ORDER — SODIUM CHLORIDE 9 MG/ML
3 INJECTION, SOLUTION INTRAMUSCULAR; INTRAVENOUS; SUBCUTANEOUS ONCE
Refills: 0 | Status: COMPLETED | OUTPATIENT
Start: 2024-11-18 | End: 2024-11-18

## 2024-11-18 RX ORDER — CEFTRIAXONE SODIUM 1 G
1000 VIAL (EA) INJECTION ONCE
Refills: 0 | Status: COMPLETED | OUTPATIENT
Start: 2024-11-18 | End: 2024-11-18

## 2024-11-18 RX ORDER — ACETAMINOPHEN 500MG 500 MG/1
650 TABLET, COATED ORAL ONCE
Refills: 0 | Status: COMPLETED | OUTPATIENT
Start: 2024-11-18 | End: 2024-11-18

## 2024-11-18 RX ORDER — INFLUENZA VIRUS VACCINE 15; 15; 15; 15 UG/.5ML; UG/.5ML; UG/.5ML; UG/.5ML
0.5 SUSPENSION INTRAMUSCULAR ONCE
Refills: 0 | Status: DISCONTINUED | OUTPATIENT
Start: 2024-11-18 | End: 2024-11-20

## 2024-11-18 RX ORDER — ACETAMINOPHEN 500MG 500 MG/1
650 TABLET, COATED ORAL ONCE
Refills: 0 | Status: DISCONTINUED | OUTPATIENT
Start: 2024-11-18 | End: 2024-11-20

## 2024-11-18 RX ADMIN — Medication 80 MILLIGRAM(S): at 14:59

## 2024-11-18 RX ADMIN — Medication 1000 MILLIGRAM(S): at 16:46

## 2024-11-18 RX ADMIN — Medication 25 MILLIGRAM(S): at 17:24

## 2024-11-18 RX ADMIN — ACETAMINOPHEN 500MG 650 MILLIGRAM(S): 500 TABLET, COATED ORAL at 15:13

## 2024-11-18 RX ADMIN — SODIUM CHLORIDE 3 MILLILITER(S): 9 INJECTION, SOLUTION INTRAMUSCULAR; INTRAVENOUS; SUBCUTANEOUS at 14:53

## 2024-11-18 NOTE — ED ADULT NURSE NOTE - IN THE PAST 12 MONTHS HAVE YOU USED DRUGS OTHER THAN THOSE REQUIRED FOR MEDICAL REASON?
[Normal] : no acute distress, well nourished, well developed and well-appearing
[Normal] : no acute distress, well nourished, well developed and well-appearing
No

## 2024-11-18 NOTE — ED PROVIDER NOTE - CLINICAL SUMMARY MEDICAL DECISION MAKING FREE TEXT BOX
Plan: Labs, XR chest & R heel, urine, CT A/P after pretreatment (? h/o IV dye allergy, pt denies).  Observe, reassess.  Podiatry consult.    14:52, C MD Cindy: Podiatry resident aware of ED consult.  Labs, radioimaging still pending. Plan: Labs, XR chest & R heel, urine, CT A/P after pretreatment (? h/o IV dye allergy, pt denies).  Observe, reassess.  Podiatry consult.    14:52, C MD Cindy: Podiatry resident aware of ED consult.  Labs, radioimaging still pending.    17:45, c MD Cindy:  X-rays wet read personally by me: No acute abnormality.  Labs results reviewed: UA positive UTI, normal lactate/WBC.  Patient still with right groin/inferior RLQ discomfort and mild tenderness.  CT A/P no acute abnormality.  Patient cleared by podiatry from their perspective does not require inpatient management regarding right infected heel ulcer.  Case discussed with Dr. Aragon and med observation accepted for further treatment of UTI and management of her discomfort. 74-year-old WF, multiple PMH including DM taken off metformin 1 month ago, prior heart surgery including s/p TAVR 8/2024 on Plavix, A-Fib on Xarelto, CHF, HLD, PVD, "elephantiasis" with varicose veins, BIB private car from home via  per visiting home care nurse referral regarding suspected worsening infection R heel ( moreso than L posterior distal calf area).  PO Doxycycline started 11/15 via [facility], subsequent D.  Exam: R heel ulcer w/ min. D/C, slt. surrounding erythema. Mild RLQ abd. TTP.    Plan: Labs, XR chest & R heel, urine, CT A/P after pretreatment (? h/o IV dye allergy, pt denies).  Observe, reassess.  Podiatry consult.    14:52, RADHA White MD: Podiatry resident aware of ED consult.  Labs, radioimaging still pending.    17:45, radha White MD:  X-rays wet read personally by me: No acute abnormality.  Labs results reviewed: U/A positive UTI, normal lactate/WBC. CT A/P no acute abnormality.  Patient cleared by Podiatry from their perspective, does not require inpatient management regarding mild right infected heel ulcer.  Patient still with right groin/inferior RLQ discomfort and mild tenderness; she &  do not feel safe w/ outpt management given her general weakness & continued pain. Case discussed with Dr. Aragon and Med Observation accepted for further treatment of UTI and management of her discomfort.

## 2024-11-18 NOTE — ED ADULT NURSE REASSESSMENT NOTE - NS ED NURSE REASSESS COMMENT FT1
Obtained beside report from VLADIMIR Iqbal at 19:00. Pt resting comfortably in bed, breathing even and unlabored. Pt awaiting admission bed. Safety and comfort measures maintained.

## 2024-11-18 NOTE — H&P ADULT - NSHPLABSRESULTS_GEN_ALL_CORE
my interpretation  CXR : RHETT        < from: CT Abdomen and Pelvis w/ IV Cont (11.18.24 @ 15:58) >    ACC: [id number] EXAM:  CT ABDOMEN AND PELVIS IC   ORDERED BY: JETT GAMEZ     PROCEDURE DATE:  11/18/2024      INTERPRETATION:  CLINICAL INFORMATION: Right lower quadrant abdominal   pain, tenderness.    COMPARISON: CT 2/2/2023    CONTRAST/COMPLICATIONS:  IV Contrast: Omnipaque 350  90 cc administered   0 cc discarded  Oral Contrast: NONE      PROCEDURE:  CT of the Abdomen and Pelvis was performed.  Sagittal and coronal reformats were performed.    FINDINGS:  LOWER CHEST: Coronary artery calcifications. Cardiomegaly.    LIVER: A 3.1 cm right hepatic mass is unchanged. Normal hepatic contour.  BILE DUCTS: Normal caliber.  GALLBLADDER: Cholecystectomy.  SPLEEN: Within normal limits.  PANCREAS: Within normal limits.  ADRENALS: Within normal limits.  KIDNEYS/URETERS: Small hypodense bilateral renal nodules are too small to   characterize. No hydronephrosis.    BLADDER: Within normal limits.  REPRODUCTIVE ORGANS: 1.9 cm right adnexal cyst is new since prior. Pelvic   ultrasound correlation suggested. Uterus and left adnexa within normal   limits.    BOWEL: Colonic diverticulosis without evidence of acute diverticulitis.   Small hiatal hernia. No bowel obstruction. Appendix is normal.  PERITONEUM/RETROPERITONEUM: No ascites or pneumoperitoneum.  VESSELS: Atherosclerotic changes.  LYMPH NODES: No lymphadenopathy.  ABDOMINAL WALL: Small fat-containing umbilical hernia. Surgical material   in the left upper anterior abdominal wall. This is unchanged.  BONES: Degenerative changes.    IMPRESSION:  Small right adnexal cyst, new since 2023. Pelvic ultrasound correlation   suggested. Otherwise, no acute findings.        --- End of Report ---    ROBERT BENITEZ MD; Attending Radiologist  This document has been electronically signed. Nov 18 2024  4:40PM    < end of copied text >

## 2024-11-18 NOTE — H&P ADULT - NSHPSOCIALHISTORY_GEN_ALL_CORE
Pt lives with her .  She reports working with a walker and  PT at home. No tob ( quit in her 40's) /ETOH/drug use.

## 2024-11-18 NOTE — H&P ADULT - MUSCULOSKELETAL
thickened skin w/cobblestoning/decreased ROM due to pain/strength 5/5 bilateral upper extremities/strength 5/5 bilateral lower extremities/extremities exam

## 2024-11-18 NOTE — PATIENT PROFILE ADULT - HOME ACCESSIBILITY CONCERNS
----- Message from Janae Ballard RT sent at 2/6/2018  1:56 PM CST -----  Contact: Gabriela, [phone], [facility] [employer]  Gabriela, [phone], [facility] [employer] [facility], requesting to check orders for pt's breast mastectomy and the bras, thanks.   none

## 2024-11-18 NOTE — ED PROVIDER NOTE - CARE PLAN
1 Principal Discharge DX:	Acute lower UTI (urinary tract infection)  Secondary Diagnosis:	Diabetic ulcer of right foot

## 2024-11-18 NOTE — PATIENT PROFILE ADULT - FALL HARM RISK - HARM RISK INTERVENTIONS

## 2024-11-18 NOTE — ED PROVIDER NOTE - WR INTERPRETATION 2
The patient feels that the cataract is significantly impacting daily activities and has elected cataract surgery. The risks, benefits, and alternatives to surgery were discussed. The patient elects to proceed with surgery. RHETT

## 2024-11-18 NOTE — ED ADULT NURSE NOTE - NSFALLRISKINTERV_ED_ALL_ED

## 2024-11-18 NOTE — ED PROVIDER NOTE - PHYSICAL EXAMINATION
Gen'l: Overweight elderly WF adult, mildly ill-appearing no respiratory discomfort, no sentence shortening, non-toxic.,  Head: NC/AT  Eyes: PERRL, EOMI  ENT: O/P clear, mm mild - moderately dry  CV: RRR, normal radial pulse  Lungs: CTA, normal respirations  GI: soft, BS hypoactive, normal pitch, + mild RLQ abd. TTP w/o R/G/mass  : Deferred, no flank nor CVAT  Neck: NT, supple w/o pain  MSK: CAPPS x 4, no focal extremity swelling nor tenderness, B/L SLR 35 degrees w/o pain, normal motor  Skin: R heel + ulcer w/ slight DC, + mild TTP, + surrounding erythema.  Post. L lower leg + deflated blister w/ surrounding chronic skin changes, no obvious infection  Neuro: A+O x 4, CN 2 -12 intact, normal speech, no focal motor/sensory deficits Gen'l: Overweight elderly WF adult, mildly ill-appearing, no respiratory discomfort, no sentence shortening, non-toxic.,  Head: NC/AT  Eyes: PERRL, EOMI  ENT: O/P clear, mm mild - moderately dry  CV: RRR, normal radial pulse  Lungs: CTA, normal respirations  GI: soft, BS hypoactive, normal pitch, + mild RLQ abd. TTP w/o R/G/mass  : Deferred, no flank nor CVAT  Neck: NT, supple w/o pain  MSK: CAPPS x 4, no focal extremity swelling nor tenderness, B/L SLR 35 degrees w/o pain, normal motor  Skin: R heel + ulcer w/ slight DC, + mild TTP, + surrounding slight erythema.  Post. L lower leg + deflated blister w/ surrounding chronic skin changes, no obvious infection  Neuro: A+O x 4, CN 2 -12 intact, normal speech, no focal motor/sensory deficits

## 2024-11-18 NOTE — ED PROVIDER NOTE - OBJECTIVE STATEMENT
74-year-old WF, multiple PMH including DM taken off metformin 1 month ago, prior heart surgery including s/p TAVR 8/2024 on Plavix, A-fib on Xarelto, CHF, HLD, PVD, elephantiasis with varicose veins, BIP private car from home per visiting home care nurse referral regarding worsening infection R heel ( moreso than L posterior distal calf area).   wound care eval  11/14 Dr. Stovall started on doxycycline regarding infected right heel which patient started 1115 with subsequent loose diarrhea.  +  small discharge from right heel chronic ulcer recently more erythematous than baseline.   Visiting nurse also noted some erythema posterior right lower leg area and patient reports chronic blister. Patient also reports few days of RLQ abdominal pain which patient reports preceded starting the antibiotic.  Denies F but admits to + chills, no SIGIFREDO, cough, SOB, cp, urine c/o's.    Patient discharged from [facility] to home 2 months ago.  PCP: Leyla 74-year-old WF, multiple PMH including DM taken off metformin 1 month ago, prior heart surgery including s/p TAVR 8/2024 on Plavix, A-Fib on Xarelto, CHF, HLD, PVD, "elephantiasis" with varicose veins, BIB private car from home via  per visiting home care nurse referral regarding suspected worsening infection R heel ( moreso than L posterior distal calf area).   wound care eval  11/14 Dr. Stovall started on Doxycycline regarding infected right heel which patient started 11/15 with subsequent loose diarrhea.  +  small discharge from right heel chronic ulcer, recently site more erythematous than baseline.   Visiting nurse also noted some erythema posterior right lower leg area where patient reports chronic blister. Patient also reports few days of RLQ abdominal pain which patient reports preceded starting the antibiotic.  Denies F but admits to + chills, no SIGIFREDO, cough, SOB, cp, nor urine c/o's.    Patient discharged from [facility] to home 2 weeks ago.  PCP: Leyla

## 2024-11-18 NOTE — H&P ADULT - NSHPPHYSICALEXAM_GEN_ALL_CORE
ICU Vital Signs Last 24 Hrs  T(C): 36.4 (18 Nov 2024 22:58), Max: 36.8 (18 Nov 2024 13:46)  T(F): 97.6 (18 Nov 2024 22:58), Max: 98.2 (18 Nov 2024 13:46)  HR: 72 (18 Nov 2024 22:58) (53 - 72)  BP: 154/50 (18 Nov 2024 22:58) (99/87 - 154/50)  BP(mean): 69 (18 Nov 2024 21:48) (64 - 93)    RR: 18 (18 Nov 2024 22:58) (12 - 20)  SpO2: 96% (18 Nov 2024 22:58) (96% - 100%)    O2 Parameters below as of 18 Nov 2024 22:58  Patient On (Oxygen Delivery Method): room air

## 2024-11-18 NOTE — ED ADULT NURSE NOTE - OBJECTIVE STATEMENT
Pt presents to the ED sent by MD. Pt reports left lower extremity swelling, reports having a wound on her feet, also endorses right sided hip pain, denies injuries or falls, denies fevers or chills.

## 2024-11-18 NOTE — H&P ADULT - REASON FOR ADMISSION
Date Pre-B Post-B Pre-L Post-L Pre-D Post-D  HS/NOC   5/29/20 103         5/28/20 116      247   5/27/20 142           Date Pre-B Post-B Pre-L Post-L Pre-D Post-D  HS/NOC   5/26 82         5/25 119         5/24 109         5/23 110         5/22      242    5/21 157         5/20 207             Suri Maher CMA  Adult Endocrinology  [facility]     UTI  weakness UTI  weakness  R heel ulcer

## 2024-11-18 NOTE — CONSULT NOTE ADULT - SUBJECTIVE AND OBJECTIVE BOX
Date of Consult: 11/18/24  HPI:  74-year-old WF, multiple PMH including DM taken off metformin 1 month ago, prior heart surgery including s/p TAVR 8/2024 on Plavix, A-fib on Xarelto, CHF, HLD, PVD, elephantiasis with varicose veins, BIP private car from home per visiting home care nurse referral regarding worsening infection R heel ( moreso than L posterior distal calf area).   wound care eval  11/14 Dr. Stovall started on doxycycline regarding infected right heel which patient started 11/15 with subsequent loose diarrhea.  +  small discharge from right heel chronic ulcer recently more erythematous than baseline.   Visiting nurse also noted some erythema posterior right lower leg area and patient reports chronic blister. Patient also reports few days of RLQ abdominal pain which patient reports preceded starting the antibiotic.  Denies F but admits to + chills, no SIGIFREDO, cough, SOB, cp, urine c/o's.    Patient discharged from [facility] to home 2 months ago.      PMH: Diabetes mellitus type II, controlled    Atrial fibrillation    Congestive heart failure    Dyspnea    Morbid obesity with BMI of 40.0-44.9, adult    Neuropathy    Peripheral venous insufficiency    Thyroid nodule    HTN (hypertension)    Cellulitis    At risk for sleep apnea      PSH:History of esophagogastroduodenoscopy (EGD)    H/O colonoscopy    Other complications of gastric band procedure    S/P left knee arthroscopy    Status post medial meniscus repair    History of cholecystectomy    S/P cataract surgery        Allergies:PC Pen VK (Rash)  latex (Unknown)  [This allergen will not trigger allergy alert] IV DYE, IODINE CONTRAST (Unknown)  penicillin (Hives; Urticaria)      Labs:                          8.1    7.40  )-----------( 369      ( 18 Nov 2024 14:12 )             26.8     WBC Trend  7.40 Date (11-18 @ 14:12)      Chem  11-18    135  |  107  |  24[H]  ----------------------------<  89  3.9   |  25  |  0.78    Ca    9.5      18 Nov 2024 14:12    TPro  7.1  /  Alb  3.0[L]  /  TBili  0.4  /  DBili  x   /  AST  10[L]  /  ALT  10[L]  /  AlkPhos  68  11-18          T(F): 98 (11-18-24 @ 16:06), Max: 98.2 (11-18-24 @ 13:46)  HR: 56 (11-18-24 @ 16:06) (53 - 56)  BP: 135/47 (11-18-24 @ 16:06) (135/47 - 138/55)  RR: 18 (11-18-24 @ 16:06) (18 - 20)  SpO2: 100% (11-18-24 @ 16:06) (98% - 100%)  Wt(kg): --    REVIEW OF SYSTEMS:    CONSTITUTIONAL: No weakness, fevers or chills  EYES: No visual changes  RESPIRATORY: No cough, wheezing; No shortness of breath  CARDIOVASCULAR: No chest pain or palpitations  GASTROINTESTINAL: No abdominal or epigastric pain. No nausea, vomiting; No diarrhea or constipation.   GENITOURINARY: No dysuria, frequency or hematuria  NEUROLOGICAL: No numbness or weakness  SKIN: See physical examination.  All other review of systems is negative unless indicated above    Physical Exam:   Constitutional: NAD, alert;  Lower Extremity Focus  Derm:  Skin warm, dry and supple bilateral.    Ulceration Right heel wound, partial thickness in nature, wound base fibrogranular, wound size (approx 1.3 cm X 1.3cm X 0.1cm)edema, no stephen-wound erythema, no pus/purulence, no crepitus/fluctuance, no tracking/tunneling, no probe to bone.  Deroofed blister noted to posterior medial Left lower midleg, some serous drainage, superficial wound bed, no malodor,  no crepitus/fluctuance, no tracking/tunneling, no PTB.   Vascular: Dorsalis Pedis and Posterior Tibial pulses weakly palpable  Neuro: Protective sensation intact to the level of the digits bilateral.  MSK: Muscle strength 5/5 all major muscle groups bilateral. Pt is minimally ambulatory.            Date of Consult: 11/18/24  HPI:  74-year-old WF, multiple PMH including DM taken off metformin 1 month ago, prior heart surgery including s/p TAVR 8/2024 on Plavix, A-fib on Xarelto, CHF, HLD, PVD, elephantiasis with varicose veins, BIP private car from home per visiting home care nurse referral regarding concerns to Rt heel. Per pt, wound has been present for over 3 weeks.  Pt had HH wound care eval on 11/14 and was started on doxycycline for right heel wound. Reports she started abx on 11/15 with subsequent loose diarrhea. Patient reports chronic blister to Left lower leg as well. Patient also reports few days of RLQ abdominal pain which patient reports preceded starting the antibiotic.  Denies F but admits to + chills, no SIGIFREDO, cough, SOB, cp, urine c/o's.   Patient discharged from [facility] to home 2 months ago. Pt states she is minimally ambulatory, states she is mostly laying down or sitting down on a chair.       PMH: Diabetes mellitus type II, controlled    Atrial fibrillation    Congestive heart failure    Dyspnea    Morbid obesity with BMI of 40.0-44.9, adult    Neuropathy    Peripheral venous insufficiency    Thyroid nodule    HTN (hypertension)    Cellulitis    At risk for sleep apnea      PSH:History of esophagogastroduodenoscopy (EGD)    H/O colonoscopy    Other complications of gastric band procedure    S/P left knee arthroscopy    Status post medial meniscus repair    History of cholecystectomy    S/P cataract surgery        Allergies:PC Pen VK (Rash)  latex (Unknown)  [This allergen will not trigger allergy alert] IV DYE, IODINE CONTRAST (Unknown)  penicillin (Hives; Urticaria)      Labs:                          8.1    7.40  )-----------( 369      ( 18 Nov 2024 14:12 )             26.8     WBC Trend  7.40 Date (11-18 @ 14:12)      Chem  11-18    135  |  107  |  24[H]  ----------------------------<  89  3.9   |  25  |  0.78    Ca    9.5      18 Nov 2024 14:12    TPro  7.1  /  Alb  3.0[L]  /  TBili  0.4  /  DBili  x   /  AST  10[L]  /  ALT  10[L]  /  AlkPhos  68  11-18          T(F): 98 (11-18-24 @ 16:06), Max: 98.2 (11-18-24 @ 13:46)  HR: 56 (11-18-24 @ 16:06) (53 - 56)  BP: 135/47 (11-18-24 @ 16:06) (135/47 - 138/55)  RR: 18 (11-18-24 @ 16:06) (18 - 20)  SpO2: 100% (11-18-24 @ 16:06) (98% - 100%)  Wt(kg): --    REVIEW OF SYSTEMS:    CONSTITUTIONAL: No weakness, fevers or chills  EYES: No visual changes  RESPIRATORY: No cough, wheezing; No shortness of breath  CARDIOVASCULAR: No chest pain or palpitations  GASTROINTESTINAL: No abdominal or epigastric pain. No nausea, vomiting; No diarrhea or constipation.   GENITOURINARY: No dysuria, frequency or hematuria  NEUROLOGICAL: No numbness or weakness  SKIN: See physical examination.  All other review of systems is negative unless indicated above    Physical Exam:   Constitutional: NAD, alert;  Lower Extremity Focus  Derm:  Skin warm, dry and supple bilateral.    Ulceration Right heel wound, partial thickness in nature, wound base fibrogranular, wound size (approx 1.3 cm X 1.3cm X 0.1cm)edema, no stephen-wound erythema, no pus/purulence, no crepitus/fluctuance, no tracking/tunneling, no probe to bone.  Deroofed blister noted to posterior medial Left lower midleg, some serous drainage, superficial wound bed, no malodor,  no crepitus/fluctuance, no tracking/tunneling, no PTB.   Vascular: Dorsalis Pedis and Posterior Tibial pulses weakly palpable  Neuro: Protective sensation intact to the level of the digits bilateral.  MSK: Muscle strength 5/5 all major muscle groups bilateral. Pt is minimally ambulatory.

## 2024-11-18 NOTE — H&P ADULT - HISTORY OF PRESENT ILLNESS
74-year-old WF, multiple PMH including DM taken off metformin 1 month ago, prior heart surgery including s/p TAVR 8/2024 on Plavix, A-fib on Xarelto, CHF, HLD, PVD, elephantiasis with varicose veins, BIP private car from home per visiting home care nurse referral regarding worsening infection R heel ( moreso than L posterior distal calf area).   wound care eval  11/14 Dr. Stovall started on doxycycline regarding infected right heel which patient started 1115 with subsequent loose diarrhea.  +  small discharge from right heel chronic ulcer recently more erythematous than baseline.   Visiting nurse also noted some erythema posterior right lower leg area and patient reports chronic blister. Patient also reports few days of RLQ abdominal pain which patient reports preceded starting the antibiotic.  Denies F but admits to + chills, no SIGIFREDO, cough, SOB, cp, urine c/o's.    Patient discharged from [facility] to home 2 months ago.    Pt is a pleasant 74-year-old WF, with PMH DM taken off metformin 1 month ago (after decreased HbA1C) ,  TAVR 8/2024 on Plavix followed by a two month rehab stay, A-fib on Xarelto, CHF, HLD, PVD, lower extremity elephantiasis  with varicose veins, who presented to [place] ED  from home after a home care nurse referred pt with  worsening infection of the right heel.   Pt has saw  wound care eval   Dr. Stovall on 11/14/24 where she was started on doxycycline.  Pt took doxycycline on  11/15/24 then stopped it two days later due to loose stools.  She reports no diarrhea for the last two days.  Pt c/o pain to the right heel.    Pt  reported a small discharge from her right heel chronic ulcer.  She thinks it is more red than her prior baseline.   She reports a chronic blister to her right leg.  Patient also reports few days of  right lower abd to groin,  which patient reported was present last week before starting the antibiotic.  She denies fever, but reports always having chills.  Pt denies URI complaints,  no urinary complaints, no SOB/ chest pain.  Pt reports working with a walker and  PT at home. She reports Dr. Mayer may have decreased her metoprolol to 25 mg once daily or twice daily  due to the addition of amiodarone and her lower blood pressure.

## 2024-11-18 NOTE — H&P ADULT - ASSESSMENT
Pt is adm w/    positive ua , likely UTI  weakness, debility  R heel partial thickness wound, did not tolerate doxycycline  Hx of TAVR on 8/2024  Hx of afib on xaralto  Hx of PVD, lower extremity elephantiasis with varicose veins,     - s/p 1 gm iv Rocephin  in the ED cont Rocephin for three days  - f/u u cx  - elevated ESR  - apprec podiatry consult  - ID consult to review tx plan for R heel  - elevate lower extr on pillows, avoid further pressure injury  - phys therapy  - DVT proph pt on xaralto  - Full Code Pt is adm w/    positive ua , likely UTI  weakness, debility  R heel partial thickness wound, did not tolerate doxycycline  R lower abd to groin pain  1.9 cm right adnexal cyst is new since prior,  seen on CT abd  Hx of TAVR on 8/2024  Hx of afib on xaralto  Hx of PVD, lower extremity elephantiasis with varicose veins,     - s/p 1 gm iv Rocephin  in the ED cont Rocephin for three days  - f/u u cx  - elevated ESR  - apprec podiatry consult  - ID consult to review tx plan for R heel  - elevate lower extr on pillows, avoid further pressure injury  - added 1 dose of morphine for pain,  resume home meds incl TID gabapentin  - R groin pain and new 1.9 cm right adnexal cyst ,  will obtain ultrasound  - phys therapy  - DVT proph pt on xaralto  - Full Code

## 2024-11-18 NOTE — H&P ADULT - CONVERSATION DETAILS
Pt would like to remain Full code and would like IVF/Antibiotics, PRBCs, etc as needed.  Pt wants to get better and see her grand children.    She would like her , Wilbert to be her HCP.

## 2024-11-18 NOTE — PHARMACOTHERAPY INTERVENTION NOTE - COMMENTS
Medication reconciliation complete.  Home medications reviewed with the patient at bedside and confirmed against Dr. [facility].  Patient is unsure if she is taking metoprolol succinate 25 mg once or twice daily.  Patient reports an allergy to penicillins (hives).    Home Medications:  Acidophilus oral tablet: 1 tab(s) orally once a day (18 Nov 2024 23:57)  doxycycline hyclate 100 mg oral capsule: 1 cap(s) orally 2 times a day x 13 days.  ***COURSE INCOMPLETE- PATIENT STATES SHE TOOK X 2 DAYS THEN DISCONTINUED DUE TO DIARRHEA*** (18 Nov 2024 23:58)  furosemide 40 mg oral tablet: 1 tab(s) orally once a day as needed (18 Nov 2024 23:54)  metoprolol succinate 25 mg oral tablet, extended release: 1 tab(s) orally once a day ***PATIENT UNSURE IF SHE IS TAKING ONCE OR TWICE DAILY*** (18 Nov 2024 23:55)  Vitamin D3 25 mcg (1000 intl units) oral capsule: 1 cap(s) orally once a day (18 Nov 2024 23:57)  zolpidem 10 mg oral tablet: 1 tab(s) orally once a day (at bedtime) as needed MDD: 1 (18 Nov 2024 23:53)

## 2024-11-18 NOTE — H&P ADULT - TIME BILLING
I spent a total of 55 minutes on the date of this encounter coordinating the patient's care. This includes reviewing documentation pertinent to this admission, results and imaging in addition to completing a history and physical examination on the patient. Further tests, medications, and procedures have been ordered as indicated. Laboratory results and the plan of care were communicated to the patient and or their family member. Supporting documentation was completed and added to the patient's chart.

## 2024-11-18 NOTE — ED ADULT TRIAGE NOTE - CHIEF COMPLAINT QUOTE
Pt presents to er with complaints of RLE foot infection sent in by ,pt has a history of diabetes and was recently taken off of Metformin, has a history of 3 open heart surgeries, on Xarelto and Plavix for a-fibb, denies chest pain, sob, fevers.

## 2024-11-18 NOTE — ED PROVIDER NOTE - NS ED ATTENDING STATEMENT MOD
[Weight management counseling provided] : Weight management [Healthy eating counseling provided] : healthy eating [Activity counseling provided] : activity [Target Wt Loss Goal ___] : Target weight loss goal [unfilled] lbs [Weight Self Once Weekly] : Weight self once weekly [Low Fat Diet] : Low fat diet [Low Salt Diet] : Low salt diet [Keep Food Diary] : Keep food diary [Decrease Portions] : Decrease food portions [___ min/wk activity recommended] : [unfilled] min/wk activity recommended [Walking] : Walking Attending Only

## 2024-11-19 ENCOUNTER — TRANSCRIPTION ENCOUNTER (OUTPATIENT)
Age: 74
End: 2024-11-19

## 2024-11-19 LAB
ANION GAP SERPL CALC-SCNC: 3 MMOL/L — LOW (ref 5–17)
BUN SERPL-MCNC: 20 MG/DL — SIGNIFICANT CHANGE UP (ref 7–23)
CALCIUM SERPL-MCNC: 9.2 MG/DL — SIGNIFICANT CHANGE UP (ref 8.5–10.1)
CHLORIDE SERPL-SCNC: 107 MMOL/L — SIGNIFICANT CHANGE UP (ref 96–108)
CO2 SERPL-SCNC: 25 MMOL/L — SIGNIFICANT CHANGE UP (ref 22–31)
CREAT SERPL-MCNC: 0.79 MG/DL — SIGNIFICANT CHANGE UP (ref 0.5–1.3)
EGFR: 78 ML/MIN/1.73M2 — SIGNIFICANT CHANGE UP
GLUCOSE SERPL-MCNC: 124 MG/DL — HIGH (ref 70–99)
HCT VFR BLD CALC: 26.8 % — LOW (ref 34.5–45)
HGB BLD-MCNC: 8.2 G/DL — LOW (ref 11.5–15.5)
MCHC RBC-ENTMCNC: 27.6 PG — SIGNIFICANT CHANGE UP (ref 27–34)
MCHC RBC-ENTMCNC: 30.6 G/DL — LOW (ref 32–36)
MCV RBC AUTO: 90.2 FL — SIGNIFICANT CHANGE UP (ref 80–100)
PLATELET # BLD AUTO: 365 K/UL — SIGNIFICANT CHANGE UP (ref 150–400)
POTASSIUM SERPL-MCNC: 3.9 MMOL/L — SIGNIFICANT CHANGE UP (ref 3.5–5.3)
POTASSIUM SERPL-SCNC: 3.9 MMOL/L — SIGNIFICANT CHANGE UP (ref 3.5–5.3)
RBC # BLD: 2.97 M/UL — LOW (ref 3.8–5.2)
RBC # FLD: 17.1 % — HIGH (ref 10.3–14.5)
SODIUM SERPL-SCNC: 135 MMOL/L — SIGNIFICANT CHANGE UP (ref 135–145)
WBC # BLD: 8.12 K/UL — SIGNIFICANT CHANGE UP (ref 3.8–10.5)
WBC # FLD AUTO: 8.12 K/UL — SIGNIFICANT CHANGE UP (ref 3.8–10.5)

## 2024-11-19 PROCEDURE — 76856 US EXAM PELVIC COMPLETE: CPT | Mod: 26

## 2024-11-19 RX ORDER — CHOLECALCIFEROL (VITAMIN D3) 10MCG/0.25
1000 DROPS ORAL DAILY
Refills: 0 | Status: DISCONTINUED | OUTPATIENT
Start: 2024-11-19 | End: 2024-11-20

## 2024-11-19 RX ORDER — ZOLPIDEM TARTRATE 10 MG/1
5 TABLET ORAL AT BEDTIME
Refills: 0 | Status: DISCONTINUED | OUTPATIENT
Start: 2024-11-19 | End: 2024-11-20

## 2024-11-19 RX ORDER — CLOPIDOGREL 75 MG/1
75 TABLET, FILM COATED ORAL DAILY
Refills: 0 | Status: DISCONTINUED | OUTPATIENT
Start: 2024-11-19 | End: 2024-11-20

## 2024-11-19 RX ORDER — ONDANSETRON HYDROCHLORIDE 4 MG/1
4 TABLET, FILM COATED ORAL EVERY 8 HOURS
Refills: 0 | Status: DISCONTINUED | OUTPATIENT
Start: 2024-11-19 | End: 2024-11-20

## 2024-11-19 RX ORDER — RIVAROXABAN 10 MG/1
20 TABLET, FILM COATED ORAL
Refills: 0 | Status: DISCONTINUED | OUTPATIENT
Start: 2024-11-19 | End: 2024-11-20

## 2024-11-19 RX ORDER — MONTELUKAST SODIUM 10 MG/1
10 TABLET ORAL AT BEDTIME
Refills: 0 | Status: DISCONTINUED | OUTPATIENT
Start: 2024-11-19 | End: 2024-11-20

## 2024-11-19 RX ORDER — GABAPENTIN 300 MG/1
800 CAPSULE ORAL THREE TIMES A DAY
Refills: 0 | Status: DISCONTINUED | OUTPATIENT
Start: 2024-11-19 | End: 2024-11-20

## 2024-11-19 RX ORDER — RIVAROXABAN 10 MG/1
20 TABLET, FILM COATED ORAL ONCE
Refills: 0 | Status: COMPLETED | OUTPATIENT
Start: 2024-11-19 | End: 2024-11-19

## 2024-11-19 RX ORDER — MAGNESIUM, ALUMINUM HYDROXIDE 200-225/5
30 SUSPENSION, ORAL (FINAL DOSE FORM) ORAL EVERY 4 HOURS
Refills: 0 | Status: DISCONTINUED | OUTPATIENT
Start: 2024-11-19 | End: 2024-11-20

## 2024-11-19 RX ORDER — EZETIMIBE 10 MG
10 TABLET ORAL AT BEDTIME
Refills: 0 | Status: DISCONTINUED | OUTPATIENT
Start: 2024-11-19 | End: 2024-11-20

## 2024-11-19 RX ORDER — DILTIAZEM HYDROCHLORIDE 240 MG/1
120 CAPSULE, COATED, EXTENDED RELEASE ORAL DAILY
Refills: 0 | Status: DISCONTINUED | OUTPATIENT
Start: 2024-11-19 | End: 2024-11-20

## 2024-11-19 RX ORDER — CEFTRIAXONE SODIUM 1 G
1000 VIAL (EA) INJECTION EVERY 24 HOURS
Refills: 0 | Status: DISCONTINUED | OUTPATIENT
Start: 2024-11-19 | End: 2024-11-19

## 2024-11-19 RX ORDER — GABAPENTIN 300 MG/1
800 CAPSULE ORAL ONCE
Refills: 0 | Status: COMPLETED | OUTPATIENT
Start: 2024-11-19 | End: 2024-11-19

## 2024-11-19 RX ORDER — OXYCODONE HYDROCHLORIDE 30 MG/1
5 TABLET ORAL EVERY 4 HOURS
Refills: 0 | Status: DISCONTINUED | OUTPATIENT
Start: 2024-11-19 | End: 2024-11-20

## 2024-11-19 RX ORDER — RIVAROXABAN 10 MG/1
TABLET, FILM COATED ORAL
Refills: 0 | Status: DISCONTINUED | OUTPATIENT
Start: 2024-11-19 | End: 2024-11-20

## 2024-11-19 RX ORDER — AMIODARONE HYDROCHLORIDE 200 MG/1
200 TABLET ORAL DAILY
Refills: 0 | Status: DISCONTINUED | OUTPATIENT
Start: 2024-11-19 | End: 2024-11-20

## 2024-11-19 RX ORDER — SPIRONOLACTONE 25 MG
25 TABLET ORAL DAILY
Refills: 0 | Status: DISCONTINUED | OUTPATIENT
Start: 2024-11-19 | End: 2024-11-20

## 2024-11-19 RX ADMIN — GABAPENTIN 800 MILLIGRAM(S): 300 CAPSULE ORAL at 01:27

## 2024-11-19 RX ADMIN — GABAPENTIN 800 MILLIGRAM(S): 300 CAPSULE ORAL at 05:28

## 2024-11-19 RX ADMIN — AMIODARONE HYDROCHLORIDE 200 MILLIGRAM(S): 200 TABLET ORAL at 10:25

## 2024-11-19 RX ADMIN — GABAPENTIN 800 MILLIGRAM(S): 300 CAPSULE ORAL at 13:09

## 2024-11-19 RX ADMIN — OXYCODONE HYDROCHLORIDE 5 MILLIGRAM(S): 30 TABLET ORAL at 15:56

## 2024-11-19 RX ADMIN — Medication 2 MILLIGRAM(S): at 00:49

## 2024-11-19 RX ADMIN — Medication 10 MILLIGRAM(S): at 22:04

## 2024-11-19 RX ADMIN — OXYCODONE HYDROCHLORIDE 5 MILLIGRAM(S): 30 TABLET ORAL at 22:35

## 2024-11-19 RX ADMIN — DILTIAZEM HYDROCHLORIDE 120 MILLIGRAM(S): 240 CAPSULE, COATED, EXTENDED RELEASE ORAL at 10:25

## 2024-11-19 RX ADMIN — Medication 1000 UNIT(S): at 10:25

## 2024-11-19 RX ADMIN — RIVAROXABAN 20 MILLIGRAM(S): 10 TABLET, FILM COATED ORAL at 01:28

## 2024-11-19 RX ADMIN — GABAPENTIN 800 MILLIGRAM(S): 300 CAPSULE ORAL at 21:59

## 2024-11-19 RX ADMIN — Medication 500 MILLIGRAM(S): at 22:03

## 2024-11-19 RX ADMIN — ZOLPIDEM TARTRATE 5 MILLIGRAM(S): 10 TABLET ORAL at 00:49

## 2024-11-19 RX ADMIN — OXYCODONE HYDROCHLORIDE 5 MILLIGRAM(S): 30 TABLET ORAL at 22:04

## 2024-11-19 RX ADMIN — OXYCODONE HYDROCHLORIDE 5 MILLIGRAM(S): 30 TABLET ORAL at 17:12

## 2024-11-19 RX ADMIN — OXYCODONE HYDROCHLORIDE 5 MILLIGRAM(S): 30 TABLET ORAL at 15:25

## 2024-11-19 RX ADMIN — MONTELUKAST SODIUM 10 MILLIGRAM(S): 10 TABLET ORAL at 22:03

## 2024-11-19 RX ADMIN — Medication 1 TABLET(S): at 10:25

## 2024-11-19 RX ADMIN — ZOLPIDEM TARTRATE 5 MILLIGRAM(S): 10 TABLET ORAL at 22:04

## 2024-11-19 RX ADMIN — Medication 25 MILLIGRAM(S): at 10:25

## 2024-11-19 RX ADMIN — OXYCODONE HYDROCHLORIDE 5 MILLIGRAM(S): 30 TABLET ORAL at 11:32

## 2024-11-19 RX ADMIN — CLOPIDOGREL 75 MILLIGRAM(S): 75 TABLET, FILM COATED ORAL at 10:25

## 2024-11-19 RX ADMIN — RIVAROXABAN 20 MILLIGRAM(S): 10 TABLET, FILM COATED ORAL at 16:44

## 2024-11-19 NOTE — CONSULT NOTE ADULT - SUBJECTIVE AND OBJECTIVE BOX
HPI:   Pt is a 74-year-old Female, with PMH DM taken off metformin 1 month ago (after decreased HbA1C) ,  TAVR 8/2024 on Plavix followed by a two month rehab stay, A-fib on Xarelto, CHF, HLD, PVD, lower extremity venous stasis with varicose veins, admitted on 11/18 for evaluation of right heel ulcer; the patient was initially seen by podiatry on 11/13 for right heel ulcer and given po doxycycline but stopped it due to diarrhea; a visiting nurse saw her heel and recommended she come to ED for evaluation; she notes right groin pain, denies dysuria, frequency, urgency, but thinks she may have a UTI; states she has a penicillin allergy as a child, details unclear. Does have pain in right heel but mostly when lying in bed, not when walking. She has had prolonged hospitalizations due to cardiac issues, followed by rehab, and only has been in her home about 2 weeks now. Walks with a walker.           PMH: as above  PSH: as above  Meds: per reconciliation sheet, noted below  MEDICATIONS  (STANDING):  aMIOdarone    Tablet 200 milliGRAM(s) Oral daily  cefuroxime   Tablet 500 milliGRAM(s) Oral every 12 hours  cholecalciferol 1000 Unit(s) Oral daily  clopidogrel Tablet 75 milliGRAM(s) Oral daily  diltiazem    milliGRAM(s) Oral daily  ezetimibe 10 milliGRAM(s) Oral at bedtime  gabapentin 800 milliGRAM(s) Oral three times a day  influenza  Vaccine (HIGH DOSE) 0.5 milliLiter(s) IntraMuscular once  lactobacillus acidophilus 1 Tablet(s) Oral daily  montelukast 10 milliGRAM(s) Oral at bedtime  rivaroxaban      rivaroxaban 20 milliGRAM(s) Oral with dinner  spironolactone 25 milliGRAM(s) Oral daily    MEDICATIONS  (PRN):  acetaminophen     Tablet .. 650 milliGRAM(s) Oral once PRN Temp greater or equal to 38C (100.4F), Mild Pain (1 - 3)  aluminum hydroxide/magnesium hydroxide/simethicone Suspension 30 milliLiter(s) Oral every 4 hours PRN Dyspepsia  ondansetron Injectable 4 milliGRAM(s) IV Push every 8 hours PRN Nausea and/or Vomiting  oxyCODONE    IR 5 milliGRAM(s) Oral every 4 hours PRN Severe Pain (7 - 10)  zolpidem 5 milliGRAM(s) Oral at bedtime PRN Insomnia  zolpidem 5 milliGRAM(s) Oral at bedtime PRN Insomnia    Allergies    PC Pen VK (Rash)  latex (Unknown)  [This allergen will not trigger allergy alert] IV DYE, IODINE CONTRAST (Unknown)  penicillin (Hives; Urticaria)    Intolerances      Social: no smoking, no alcohol, no illegal drugs; no recent travel, no exposure to TB  FAMILY HISTORY:  Family history of breast cancer in mother    Family history of diabetes mellitus in mother    FHx: heart disease (Sibling)         ROS: the patient denies fever,  no HA, no dizziness, no sore throat, no blurry vision, no CP, no palpitations, no SOB, no cough, no abdominal pain, no diarrhea, no N/V, no dysuria, no leg pain, no claudication, no rash, no joint aches, no rectal pain or bleeding, no night sweats  All other systems reviewed and are negative    Vital Signs Last 24 Hrs  T(C): 36.9 (19 Nov 2024 07:23), Max: 36.9 (19 Nov 2024 07:23)  T(F): 98.4 (19 Nov 2024 07:23), Max: 98.4 (19 Nov 2024 07:23)  HR: 60 (19 Nov 2024 07:23) (53 - 72)  BP: 136/50 (19 Nov 2024 07:23) (99/87 - 154/50)  BP(mean): 69 (18 Nov 2024 21:48) (64 - 93)  RR: 17 (19 Nov 2024 07:23) (12 - 20)  SpO2: 98% (19 Nov 2024 07:23) (96% - 100%)    Parameters below as of 19 Nov 2024 07:23  Patient On (Oxygen Delivery Method): room air      Daily     Daily     PE:    Constitutional: frail looking  HEENT: NC/AT, EOMI, PERRLA, conjunctivae clear; ears and nose atraumatic; pharynx clear  Neck: supple; thyroid not palpable  Back: no tenderness  Respiratory: respiratory effort normal; clear to auscultation  Cardiovascular: S1S2 regular, no murmurs  Abdomen: soft, not tender, not distended, positive BS; no liver or spleen organomegaly  Genitourinary: no suprapubic tenderness  Musculoskeletal: no muscle tenderness, venous stasis changes bilaterally, right heel with dry ulcer crusted  Neurological/ Psychiatric: AxOx3, judgement and insight normal;  moving all extremities  Skin: no rashes; no palpable lesions, healed scar under sternum, with pinpoint opening and scab    Labs: all available labs reviewed                        8.2    8.12  )-----------( 365      ( 19 Nov 2024 09:01 )             26.8     11-19    135  |  107  |  20  ----------------------------<  124[H]  3.9   |  25  |  0.79    Ca    9.2      19 Nov 2024 09:01    TPro  7.1  /  Alb  3.0[L]  /  TBili  0.4  /  DBili  x   /  AST  10[L]  /  ALT  10[L]  /  AlkPhos  68  11-18     LIVER FUNCTIONS - ( 18 Nov 2024 14:12 )  Alb: 3.0 g/dL / Pro: 7.1 gm/dL / ALK PHOS: 68 U/L / ALT: 10 U/L / AST: 10 U/L / GGT: x           Urinalysis Basic - ( 19 Nov 2024 09:01 )    Color: x / Appearance: x / SG: x / pH: x  Gluc: 124 mg/dL / Ketone: x  / Bili: x / Urobili: x   Blood: x / Protein: x / Nitrite: x   Leuk Esterase: x / RBC: x / WBC x   Sq Epi: x / Non Sq Epi: x / Bacteria: x          < from: US Pelvis Complete (11.19.24 @ 08:18) >    ACC: [id number] EXAM:  US PELVIC COMPLETE   ORDERED BY: DARIEN NGO     PROCEDURE DATE:  11/19/2024          INTERPRETATION:  CLINICAL INFORMATION: Follow-up evaluation of new right   adnexal cyst on CT imaging.    LMP: Postmenopausal    COMPARISON: CT dated 11/18/2024.    TECHNIQUE:  Transabdominal pelvic sonogram only.    FINDINGS:  Uterus: Not visualized.      Right ovary: 2.2 x 1.8 x 2.1 cm sized complex cystic structure. This is   incompletely evaluated and characterized by transabdominal scanning.   Correlate with tumor markers and if needed either follow-up or MR imaging   for further evaluation.  Left ovary: Not visualized.    Fluid: None.    IMPRESSION:  2.2 cm sized complex cystic structure in the right adnexal region as   described above.      < end of copied text >        < from: CT Abdomen and Pelvis w/ IV Cont (11.18.24 @ 15:58) >    ACC: [id number] EXAM:  CT ABDOMEN AND PELVIS IC   ORDERED BY: JETT GAMEZ     PROCEDURE DATE:  11/18/2024          INTERPRETATION:  CLINICAL INFORMATION: Right lower quadrant abdominal   pain, tenderness.    COMPARISON: CT 2/2/2023    CONTRAST/COMPLICATIONS:  IV Contrast: Omnipaque 350  90 cc administered   0 cc discarded  Oral Contrast: NONE      PROCEDURE:  CT of the Abdomen and Pelvis was performed.  Sagittal and coronal reformats were performed.    FINDINGS:  LOWER CHEST: Coronary artery calcifications. Cardiomegaly.    LIVER: A 3.1 cm right hepatic mass is unchanged. Normal hepatic contour.  BILE DUCTS: Normal caliber.  GALLBLADDER: Cholecystectomy.  SPLEEN: Within normal limits.  PANCREAS: Within normal limits.  ADRENALS: Within normal limits.  KIDNEYS/URETERS: Small hypodense bilateral renal nodules are too small to   characterize. No hydronephrosis.    BLADDER: Within normal limits.  REPRODUCTIVE ORGANS: 1.9 cm right adnexal cyst is new since prior. Pelvic   ultrasound correlation suggested. Uterus and left adnexa within normal   limits.    BOWEL: Colonic diverticulosis without evidence of acute diverticulitis.   Small hiatal hernia. No bowel obstruction. Appendix is normal.  PERITONEUM/RETROPERITONEUM: No ascites orpneumoperitoneum.  VESSELS: Atherosclerotic changes.  LYMPH NODES: No lymphadenopathy.  ABDOMINAL WALL: Small fat-containing umbilical hernia. Surgical material   in the left upper anterior abdominal wall. This is unchanged.  BONES: Degenerative changes.    IMPRESSION:  Small right adnexal cyst, new since 2023. Pelvic ultrasound correlation   suggested. Otherwise, no acute findings.    < end of copied text >  < from: Xray Calcaneus, Right (11.18.24 @ 15:52) >    ACC: [id number] EXAM:  XR CALCANEOUS MIN 2 VIEWS RT   ORDERED BY:   JETT CONTINLARS     PROCEDURE DATE:  11/18/2024          INTERPRETATION:  Clinical history: 74-year-old female, right heel ulcer,   rule out osteomyelitis.    Two views of the right calcaneus.    FINDINGS: Moderate degenerative change with no fracture, dislocation,   gross cortical destruction or soft tissue emphysema.    Prominent plantar calcaneal spur and small Achilles enthesophyte evident   on the lateral view.    IMPRESSION:    No gross cortical destruction or soft tissue emphysema. If there is   continued clinical concern follow-up MRI can be ordered      < end of copied text >          Radiology: all available radiological tests reviewed    Advanced directives addressed: full resuscitation

## 2024-11-19 NOTE — PROGRESS NOTE ADULT - ATTENDING COMMENTS
Patient seen and evaluate stable heel ulceration decubitus likely from recent immobility no clinical signs of infection  patient has underlying venous stasis disease would benefit from outpatient venous compression therapy and possible ablation  continue dressing changes and offload the site Patient seen and evaluate stable heel ulceration decubitus likely from recent immobility no clinical signs of infection  patient has underlying venous stasis disease would benefit from outpatient venous compression therapy and possible ablation  continue dressing changes and offload the site  follow up with  at wound care center at discharge

## 2024-11-19 NOTE — DISCHARGE NOTE NURSING/CASE MANAGEMENT/SOCIAL WORK - FINANCIAL ASSISTANCE
[facility] provides services at a reduced cost to those who are determined to be eligible through [facility]’s financial assistance program. Information regarding [facility]’s financial assistance program can be found by going to [email or web address] or by calling [phone].

## 2024-11-19 NOTE — DISCHARGE NOTE PROVIDER - NSFOLLOWUPCLINICS_GEN_ALL_ED_FT
[facility]  Wound Care  [address] [place] [address]  Phone: [phone]  Fax: [phone]

## 2024-11-19 NOTE — DISCHARGE NOTE PROVIDER - NSDCFUADDAPPT_GEN_ALL_CORE_FT
APPTS ARE READY TO BE MADE: [X] YES    Best Family or Patient Contact (if needed):    Additional Information about above appointments (if needed):    1:  2:  3:  APPTS ARE READY TO BE MADE: [X] YES    Best Family or Patient Contact (if needed):    Additional Information about above appointments (if needed):    1:  2:  3:   Patient advised they did not want to proceed with scheduling appointments with the providers on their referrals. They will coordinate care on their own.

## 2024-11-19 NOTE — PROGRESS NOTE ADULT - ASSESSMENT
#Right heel wound without evidence of cellulitis  podiatry and id consult reviewed   discussed with ID Dr. Pearce in person  Monitor off antibiotics  Follow-up with podiatry at wound care center  Follow-up with outpatient vascular surgeon Dr. Yoo  Pain control with oxycodone 5 mg every 6 as needed    #weakness  #debility  Patient is status post 9-week rehab stent at [facility]  Has been home for the last 2 weeks and has been getting home PT OT and nursing  PT eval here noted for home PT  Continue with home care    #Acute UTI  Status post ceftriaxone   Per ID changed to Ceftin 500 twice daily  Follow-up on cultures    #Status post TAVR  #p A-fib  Continue with Xarelto and home meds    #PVD  c/w statin pavithra plavix   f/u Dr. Yoo as outpatient    fully anticoagulated with xarelto  Dispo: If pain controlled with oxycodone will discharge with 5-day supply   #Right heel wound without evidence of cellulitis  podiatry and id consult reviewed   discussed with ID Dr. Pearce in person  Monitor off antibiotics  Follow-up with podiatry at wound care center  Follow-up with outpatient vascular surgeon Dr. Yoo  Pain control with oxycodone 5 mg every 6 as needed    addendum: pt would like to monitor pain control overnight prior to dc    #weakness  #debility  Patient is status post 9-week rehab stent at [facility]  Has been home for the last 2 weeks and has been getting home PT OT and nursing  PT eval here noted for home PT  Continue with home care    #Acute UTI  Status post ceftriaxone   Per ID changed to Ceftin 500 twice daily  Follow-up on cultures    #Status post TAVR  #p A-fib  Continue with Xarelto and home meds    #PVD  c/w statin pavithra plavix   f/u Dr. Yoo as outpatient    fully anticoagulated with xarelto  Dispo: If pain controlled with oxycodone will discharge with 5-day supply  remain obs is dced in AM; otherwise admission order required tomorrow per CM

## 2024-11-19 NOTE — CONSULT NOTE ADULT - ASSESSMENT
A: 73 y/o Female seen for the following:   -Rt heel partial thickness pressure wound, stable without acute SOI  -BLE venous stasis    P:   Chart reviewed and Patient evaluated  X-rays reviewed : on wet read showing no soft tissue gas, no cortical erosions. Noted arterial calcifications.  Wound to Rt heel is partial thickness, fibrogranular wound bed, no active discharge, no crepitus, no erythema, no malodor, no PTB, stable without gross SOI.   Noted deroofed blister to posterior Left lower mid leg, with some serous drainage, no malodor, superficial in nature, stable without SOI.   Wound flushed with NS. Applied betadine with dry sterile dressing to Rt heel. Compressive dressings applied to LLE.   No acute podiatric intervention warranted at this time. Continue with local wound care.   Pt to follow up at [facility] with Dr. Stovall for continued wound care treatment.   WBAT to BLE  Offload bilateral heels with two pillows under bilateral calfs or total CAIR boots to prevent further soft tissue damage as pt is has been predominately bedbound or sitting on a chair while placing pressure to heels.    Antibiotics as per ED  All additional care per ED appreciated  Patient demonstrated verbal understanding of all interventions and tolerated interventions well without any complications.       Case D/W attending Dr. Stovall
Pt is a 74-year-old Female, with PMH DM taken off metformin 1 month ago (after decreased HbA1C) ,  TAVR 8/2024 on Plavix followed by a two month rehab stay, A-fib on Xarelto, CHF, HLD, PVD, lower extremity venous stasis with varicose veins, admitted on 11/18 for evaluation of right heel ulcer; the patient was initially seen by podiatry on 11/13 for right heel ulcer and given po doxycycline but stopped it due to diarrhea; a visiting nurse saw her heel and recommended she come to ED for evaluation; she notes right groin pain, denies dysuria, frequency, urgency, but thinks she may have a UTI; states she has a penicillin allergy as a child, details unclear. Does have pain in right heel but mostly when lying in bed, not when walking. She has had prolonged hospitalizations due to cardiac issues, followed by rehab, and only has been in her home about 2 weeks now. Walks with a walker.     1. Patient admitted with right foot ulcer, also noted with pyuria, possibly early cystitis in this elderly female patient with chronic venous stasis  - follow up cultures   - serial cbc and monitor temperature   - reviewed prior medical records to evaluate for resistant or atypical pathogens   - iv hydration and supportive care   - wound care for lower extremities and right foot per podiatry  - doubt right foot ulcer is infected and local wound care should be adequate  - encourage physical therapy  - pending urine culture will empirically start ceftin 500 mg po q 12 hours  - monitor in view of penicillin allergy, however most remote penicillin allergies are due to cogeners in the older penicillin formulations  2. other issues; per medicine    [facility] token not applicable as patient will be on oral antibiotics

## 2024-11-19 NOTE — PHYSICAL THERAPY INITIAL EVALUATION ADULT - DIAGNOSIS, PT EVAL
+UA , likely UTI, positive ua , likely UTI, R heel partial thickness wound, 1.9 cm right adnexal cyst is new since prior,  seen on CT abd,

## 2024-11-19 NOTE — DISCHARGE NOTE PROVIDER - CARE PROVIDER_API CALL
Justo Mayer  Cardiovascular Disease  [address]  [place] [address]  Phone: [phone]  Fax: [phone]  Follow Up Time: 7-10 Days

## 2024-11-19 NOTE — PHYSICAL THERAPY INITIAL EVALUATION ADULT - NSPTDMEREC_GEN_A_CORE
Pt will requires a rolling walker at home due to their dx of difficulty walking to help complete their MRADL's.  Pt may have RW at home/rolling walker

## 2024-11-19 NOTE — DISCHARGE NOTE PROVIDER - NSDCMRMEDTOKEN_GEN_ALL_CORE_FT
Acidophilus oral tablet: 1 tab(s) orally once a day  amiodarone 200 mg oral tablet: 1 tab(s) orally once a day  Cardizem  mg/24 hours oral capsule, extended release: 1 cap(s) orally once a day  clopidogrel 75 mg oral tablet: 1 tab(s) orally once a day  doxycycline hyclate 100 mg oral capsule: 1 cap(s) orally 2 times a day x 13 days.  ***COURSE INCOMPLETE- PATIENT STATES SHE TOOK X 2 DAYS THEN DISCONTINUED DUE TO DIARRHEA***  ezetimibe 10 mg oral tablet: 1 tab(s) orally once a day (in the evening)  furosemide 40 mg oral tablet: 1 tab(s) orally once a day as needed  gabapentin 800 mg oral tablet: 1 tab(s) orally 3 times a day  metoprolol succinate 25 mg oral tablet, extended release: 1 tab(s) orally once a day ***PATIENT UNSURE IF SHE IS TAKING ONCE OR TWICE DAILY***  montelukast 10 mg oral tablet: 1 tab(s) orally once a day (at bedtime)  pravastatin 10 mg oral tablet: 1 tab(s) orally once a day (in the evening)  spironolactone 25 mg oral tablet: 1 tab(s) orally once a day  Vitamin D3 25 mcg (1000 intl units) oral capsule: 1 cap(s) orally once a day  Xarelto 20 mg oral tablet: 1 tab(s) orally once a day  zolpidem 10 mg oral tablet: 1 tab(s) orally once a day (at bedtime) as needed MDD: 1   Acidophilus oral tablet: 1 tab(s) orally once a day  amiodarone 200 mg oral tablet: 1 tab(s) orally once a day  Cardizem  mg/24 hours oral capsule, extended release: 1 cap(s) orally once a day  cefuroxime 500 mg oral tablet: 1 tab(s) orally every 12 hours  clopidogrel 75 mg oral tablet: 1 tab(s) orally once a day  ezetimibe 10 mg oral tablet: 1 tab(s) orally once a day (in the evening)  furosemide 40 mg oral tablet: 1 tab(s) orally once a day as needed  gabapentin 800 mg oral tablet: 1 tab(s) orally 3 times a day  metoprolol succinate 25 mg oral tablet, extended release: 1 tab(s) orally once a day ***PATIENT UNSURE IF SHE IS TAKING ONCE OR TWICE DAILY***  montelukast 10 mg oral tablet: 1 tab(s) orally once a day (at bedtime)  oxyCODONE 5 mg oral tablet: 1 tab(s) orally every 4 hours as needed for Severe Pain (7 - 10) MDD: 30  pravastatin 10 mg oral tablet: 1 tab(s) orally once a day (in the evening)  spironolactone 25 mg oral tablet: 1 tab(s) orally once a day  Vitamin D3 25 mcg (1000 intl units) oral capsule: 1 cap(s) orally once a day  Xarelto 20 mg oral tablet: 1 tab(s) orally once a day  zolpidem 10 mg oral tablet: 1 tab(s) orally once a day (at bedtime) as needed MDD: 1

## 2024-11-19 NOTE — PHYSICAL THERAPY INITIAL EVALUATION ADULT - PERTINENT HX OF CURRENT PROBLEM, REHAB EVAL
74-y/o F, to [place] ED  from home after a home care nurse referred pt with  worsening infection of the right heel.   PMH DM ,TAVR 8/2024 on Plavix followed by a two month rehab stay, A-fib on Xarelto, CHF, HLD, PVD, lower extremity elephantiasis  with varicose veins,

## 2024-11-19 NOTE — PROGRESS NOTE ADULT - TIME BILLING
Time spent  coordinating the patient's care. This includes reviewing documentation pertinent to this admission, results and imaging. Further tests, medications, and procedures have been ordered as indicated. Laboratory results and the plan of care were communicated to the patient. Discussed care plan with consultants including ID podiatry. Supporting documentation was completed and added to the patient's chart.

## 2024-11-19 NOTE — PHYSICAL THERAPY INITIAL EVALUATION ADULT - GENERAL OBSERVATIONS, REHAB EVAL
Pt seen on 5E, NAD, willing and cooperative, c/o 4/10 R heel pain prior to session and 6/10 after session, R CAM BOOT given, Pt able to ambulate w/ hard sole on boot , and sx shoe for ambulation then CAM BOOT.

## 2024-11-19 NOTE — DISCHARGE NOTE NURSING/CASE MANAGEMENT/SOCIAL WORK - PATIENT PORTAL LINK FT
You can access the FollowMyHealth Patient Portal offered by [facility] by registering at the following website: [email or web address]. By joining [employer]’s FollowMyHealth portal, you will also be able to view your health information using other applications (apps) compatible with our system.

## 2024-11-19 NOTE — DISCHARGE NOTE PROVIDER - NSDCHHNEEDSERVICE_GEN_ALL_CORE
Rehabilitation services Interpolation Flap Text: A decision was made to reconstruct the defect utilizing an interpolation axial flap and a staged reconstruction.  A telfa template was made of the defect.  This telfa template was then used to outline the interpolation flap.  The donor area for the pedicle flap was then injected with anesthesia.  The flap was excised through the skin and subcutaneous tissue down to the layer of the underlying musculature.  The interpolation flap was carefully excised within this deep plane to maintain its blood supply.  The edges of the donor site were undermined.   The donor site was closed in a primary fashion.  The pedicle was then rotated into position and sutured.  Once the tube was sutured into place, adequate blood supply was confirmed with blanching and refill.  The pedicle was then wrapped with xeroform gauze and dressed appropriately with a telfa and gauze bandage to ensure continued blood supply and protect the attached pedicle.

## 2024-11-19 NOTE — DISCHARGE NOTE PROVIDER - NSDCCPCAREPLAN_GEN_ALL_CORE_FT
PRINCIPAL DISCHARGE DIAGNOSIS  Diagnosis: Acute lower UTI (urinary tract infection)  Assessment and Plan of Treatment: please take antibiotics as prescribed      SECONDARY DISCHARGE DIAGNOSES  Diagnosis: Diabetic ulcer of right foot  Assessment and Plan of Treatment: please follow up with wound care center and Dr. Yoo (vascular sx)

## 2024-11-19 NOTE — DISCHARGE NOTE PROVIDER - NSDCFUSCHEDAPPT_GEN_ALL_CORE_FT
Zay Stovall [facility]  HNT PreAdmits  Scheduled Appointment: 11/20/2024

## 2024-11-19 NOTE — DISCHARGE NOTE PROVIDER - HOSPITAL COURSE
74-year-old WF, with PMH DM taken off metformin 1 month ago (after decreased HbA1C) ,  TAVR 8/2024 on Plavix followed by a two month rehab stay, A-fib on Xarelto, CHF, HLD, PVD, lower extremity elephantiasis  with varicose veins, who presented to [facility]  from home after a home care nurse referred pt with  worsening infection of the right heel.   Pt has saw  wound care eval   Dr. Stovall on 11/14/24 where she was started on doxycycline.  Pt took doxycycline on  11/15/24 then stopped it two days later due to loose stools.  She reports no diarrhea for the last two days.  Pt c/o pain to the right heel.    Pt  reported a small discharge from her right heel chronic ulcer.  She thinks it is more red than her prior baseline. Patient has been afebrile with normal white count.  However complains of significant right lateral foot pain at the area of the ulcer despite gabapentin 800 3 times daily.  Wound evaluated by ID and podiatry as well as myself.  There is no evidence of acute infection at this time.  Legs are nonedematous though have evidence of stasis dermatitis bilaterally.  Left leg which was previously weeping per patient has no drainage at this time.  Patient will not require antibiotics for lower extremity wounds. Advised to follow-up with her vascular surgeon which she has lost contact with Dr. Yoo. Patient provided oxycodone for pain control. Notably on admission UA appears to be positive however patient is asymptomatic with the exception of chills.  Given chills will empirically treat with Ceftin 500 twice daily and follow-up cultures.    #Right heel wound without evidence of cellulitis  podiatry and id consult reviewed   discussed with ID Dr. Pearce in person  Monitor off antibiotics  Follow-up with podiatry at wound care center  Follow-up with outpatient vascular surgeon Dr. Yoo  Pain control with oxycodone 5 mg every 6 as needed    #weakness  #debility  Patient is status post 9-week rehab stent at [facility]  Has been home for the last 2 weeks and has been getting home PT OT and nursing  PT eval here noted for home PT  Continue with home care    #Acute UTI  Status post ceftriaxone   Per ID changed to Ceftin 500 twice daily  Follow-up on cultures    #Status post TAVR  #p A-fib  Continue with Xarelto and home meds    #PVD  c/w statin pavithra plavix   f/u Dr. Yoo as outpatient    fully anticoagulated with xarelto 74-year-old WF, with PMH DM taken off metformin 1 month ago (after decreased HbA1C) ,  TAVR 8/2024 on Plavix followed by a two month rehab stay, A-fib on Xarelto, CHF, HLD, PVD, lower extremity elephantiasis  with varicose veins, who presented to [facility]  from home after a home care nurse referred pt with  worsening infection of the right heel.   Pt has saw  wound care eval   Dr. Stovall on 11/14/24 where she was started on doxycycline.  Pt took doxycycline on  11/15/24 then stopped it two days later due to loose stools.  She reports no diarrhea for the last two days.  Pt c/o pain to the right heel.    Pt  reported a small discharge from her right heel chronic ulcer.  She thinks it is more red than her prior baseline. Patient has been afebrile with normal white count.  However complains of significant right lateral foot pain at the area of the ulcer despite gabapentin 800 3 times daily.  Wound evaluated by ID and podiatry as well as myself.  There is no evidence of acute infection at this time.  Legs are nonedematous though have evidence of stasis dermatitis bilaterally.  Left leg which was previously weeping per patient has no drainage at this time.  Patient will not require antibiotics for lower extremity wounds. Advised to follow-up with her vascular surgeon which she has lost contact with Dr. Yoo. Patient provided oxycodone for pain control. Notably on admission UA appears to be positive however patient is asymptomatic with the exception of chills.  Given chills will empirically treat with Ceftin 500 twice daily and follow-up cultures.    #Right heel wound without evidence of cellulitis  podiatry and id consult reviewed   discussed with ID Dr. Pearce in person  Monitor off antibiotics  Follow-up with podiatry at wound care center  Follow-up with outpatient vascular surgeon Dr. Yoo  Pain control with oxycodone 5 mg every 6 as needed    #weakness  #debility  Patient is status post 9-week rehab stent at [facility]  Has been home for the last 2 weeks and has been getting home PT OT and nursing  PT eval here noted for home PT  Continue with home care    #Acute UTI  Status post ceftriaxone   Per ID changed to Ceftin 500 twice daily  Follow-up on cultures    #Status post TAVR  #p A-fib  Continue with Xarelto and home meds    #PVD  c/w statin pavithra plavix   f/u Dr. Yoo as outpatient    fully anticoagulated with xarelto    PHYSICAL EXAM:    GENERAL: Comfortable, no acute distress   HEAD:  Normocephalic, atraumatic  EYES: EOMI, PERRLA  HEENT: Moist mucous membranes  NECK: Supple, No JVD  NERVOUS SYSTEM:  Alert & Oriented X3, Motor Strength 5/5 B/L upper and lower extremities  CHEST/LUNG: Clear to auscultation bilaterally  HEART: Regular rate and rhythm  ABDOMEN: Soft, non tender, Nondistended, Bowel sounds present  GENITOURINARY: Voiding, no palpable bladder  EXTREMITIES:   b/l venous stasis changes, r ankle wrapped  MUSCULOSKELETAL- No muscle tenderness, no joint tenderness  SKIN-no rash

## 2024-11-19 NOTE — PROGRESS NOTE ADULT - ASSESSMENT
A: 75 y/o Female seen for the following:   -Rt heel partial thickness pressure wound, stable without acute SOI  -BLE venous stasis    P:   Chart reviewed and Patient evaluated  X-rays reviewed : on wet read showing no soft tissue gas, no cortical erosions. Noted arterial calcifications.  Wound to Rt heel is partial thickness, fibrogranular wound bed, no active discharge, no crepitus, no erythema, no malodor, no PTB, stable without gross SOI.   Noted deroofed blister to posterior Left lower mid leg, with some serous drainage, no malodor, superficial in nature, stable without SOI.   WC: betadine with dry sterile dressing to Rt heel. Compressive dressings applied to LLE  No acute podiatric intervention warranted at this time. Continue with local wound care.   Pt to follow up at [facility] with Dr. Stovall for continued wound care treatment.   WBAT to BLE  Offload bilateral heels with two pillows under bilateral calfs or total CAIR boots to prevent further soft tissue damage as pt is has been predominately bedbound or sitting on a chair while placing pressure to heels.    All additional care per Med appreciated  Patient demonstrated verbal understanding of all interventions and tolerated interventions well without any complications.       Case seen with attending Dr. Seals

## 2024-11-20 VITALS
DIASTOLIC BLOOD PRESSURE: 50 MMHG | OXYGEN SATURATION: 95 % | RESPIRATION RATE: 17 BRPM | HEART RATE: 60 BPM | SYSTOLIC BLOOD PRESSURE: 121 MMHG | TEMPERATURE: 98 F

## 2024-11-20 DIAGNOSIS — N39.0 URINARY TRACT INFECTION, SITE NOT SPECIFIED: ICD-10-CM

## 2024-11-20 LAB
-  AMOXICILLIN/CLAVULANIC ACID: SIGNIFICANT CHANGE UP
-  AMPICILLIN/SULBACTAM: SIGNIFICANT CHANGE UP
-  AMPICILLIN: SIGNIFICANT CHANGE UP
-  AZTREONAM: SIGNIFICANT CHANGE UP
-  CEFAZOLIN: SIGNIFICANT CHANGE UP
-  CEFEPIME: SIGNIFICANT CHANGE UP
-  CEFOXITIN: SIGNIFICANT CHANGE UP
-  CEFTRIAXONE: SIGNIFICANT CHANGE UP
-  CEFUROXIME: SIGNIFICANT CHANGE UP
-  CIPROFLOXACIN: SIGNIFICANT CHANGE UP
-  ERTAPENEM: SIGNIFICANT CHANGE UP
-  GENTAMICIN: SIGNIFICANT CHANGE UP
-  IMIPENEM: SIGNIFICANT CHANGE UP
-  LEVOFLOXACIN: SIGNIFICANT CHANGE UP
-  MEROPENEM: SIGNIFICANT CHANGE UP
-  NITROFURANTOIN: SIGNIFICANT CHANGE UP
-  PIPERACILLIN/TAZOBACTAM: SIGNIFICANT CHANGE UP
-  TOBRAMYCIN: SIGNIFICANT CHANGE UP
-  TRIMETHOPRIM/SULFAMETHOXAZOLE: SIGNIFICANT CHANGE UP
CULTURE RESULTS: ABNORMAL
GLUCOSE BLDC GLUCOMTR-MCNC: 114 MG/DL — HIGH (ref 70–99)
METHOD TYPE: SIGNIFICANT CHANGE UP
ORGANISM # SPEC MICROSCOPIC CNT: ABNORMAL
ORGANISM # SPEC MICROSCOPIC CNT: SIGNIFICANT CHANGE UP
SPECIMEN SOURCE: SIGNIFICANT CHANGE UP

## 2024-11-20 RX ORDER — OXYCODONE HYDROCHLORIDE 30 MG/1
1 TABLET ORAL
Qty: 30 | Refills: 0 | DISCHARGE
Start: 2024-11-20 | End: 2024-11-24

## 2024-11-20 RX ORDER — OXYCODONE HYDROCHLORIDE 30 MG/1
1 TABLET ORAL
Qty: 30 | Refills: 0
Start: 2024-11-20 | End: 2024-11-24

## 2024-11-20 RX ADMIN — OXYCODONE HYDROCHLORIDE 5 MILLIGRAM(S): 30 TABLET ORAL at 10:04

## 2024-11-20 RX ADMIN — GABAPENTIN 800 MILLIGRAM(S): 300 CAPSULE ORAL at 05:42

## 2024-11-20 RX ADMIN — AMIODARONE HYDROCHLORIDE 200 MILLIGRAM(S): 200 TABLET ORAL at 09:58

## 2024-11-20 RX ADMIN — Medication 1 TABLET(S): at 09:57

## 2024-11-20 RX ADMIN — Medication 1000 UNIT(S): at 09:58

## 2024-11-20 RX ADMIN — OXYCODONE HYDROCHLORIDE 5 MILLIGRAM(S): 30 TABLET ORAL at 11:16

## 2024-11-20 RX ADMIN — CLOPIDOGREL 75 MILLIGRAM(S): 75 TABLET, FILM COATED ORAL at 09:58

## 2024-11-20 RX ADMIN — Medication 25 MILLIGRAM(S): at 09:58

## 2024-11-20 RX ADMIN — OXYCODONE HYDROCHLORIDE 5 MILLIGRAM(S): 30 TABLET ORAL at 04:02

## 2024-11-20 RX ADMIN — DILTIAZEM HYDROCHLORIDE 120 MILLIGRAM(S): 240 CAPSULE, COATED, EXTENDED RELEASE ORAL at 09:57

## 2024-11-20 RX ADMIN — Medication 500 MILLIGRAM(S): at 09:57

## 2024-11-20 NOTE — PROGRESS NOTE ADULT - REASON FOR ADMISSION
UTI  weakness  R heel ulcer

## 2024-11-20 NOTE — PROGRESS NOTE ADULT - ASSESSMENT
Pt is a 74-year-old Female, with PMH DM taken off metformin 1 month ago (after decreased HbA1C) ,  TAVR 8/2024 on Plavix followed by a two month rehab stay, A-fib on Xarelto, CHF, HLD, PVD, lower extremity venous stasis with varicose veins, admitted on 11/18 for evaluation of right heel ulcer; the patient was initially seen by podiatry on 11/13 for right heel ulcer and given po doxycycline but stopped it due to diarrhea; a visiting nurse saw her heel and recommended she come to ED for evaluation; she notes right groin pain, denies dysuria, frequency, urgency, but thinks she may have a UTI; states she has a penicillin allergy as a child, details unclear. Does have pain in right heel but mostly when lying in bed, not when walking. She has had prolonged hospitalizations due to cardiac issues, followed by rehab, and only has been in her home about 2 weeks now. Walks with a walker.     1. Patient admitted with right foot ulcer, also noted with pyuria, possibly early cystitis in this elderly female patient with chronic venous stasis  - follow up cultures   - serial cbc and monitor temperature   - reviewed prior medical records to evaluate for resistant or atypical pathogens   - iv hydration and supportive care   - wound care for lower extremities and right foot per podiatry  - doubt right foot ulcer is infected and local wound care should be adequate  - encourage physical therapy  - pending urine culture will empirically start ceftin 500 mg po q 12 hours, day#2  - tolerating antibiotics without rashes or side effects   - monitor in view of penicillin allergy, however most remote penicillin allergies are due to cogeners in the older penicillin formulations  - okay from id standpoint to discharge home; I will follow up urine culture, however, even if resistant bacteria, patient is improved, no complaints and will just finish the course of ceftin as ordered  2. other issues; per medicine    [facility] token not applicable as patient will be on oral antibiotics

## 2024-11-20 NOTE — PROGRESS NOTE ADULT - ASSESSMENT
A: 73 y/o Female seen for the following:   -Rt heel partial thickness pressure wound, stable without acute SOI  -BLE venous stasis    P:   Chart reviewed and Patient evaluated  X-rays reviewed : on wet read showing no soft tissue gas, no cortical erosions. Noted arterial calcifications.  Wound to Rt heel is partial thickness, fibrogranular wound bed, no active discharge, no crepitus, no erythema, no malodor, no PTB, stable without gross SOI.   Noted deroofed blister to posterior Left lower mid leg, with some serous drainage, no malodor, superficial in nature, stable without SOI.   WC: betadine with dry sterile dressing to Rt heel. Compressive dressings applied to LLE  No acute podiatric intervention warranted at this time. Continue with local wound care.   Pt to follow up at [facility] with Dr. Stovall for continued wound care treatment.   WBAT to BLE  Offload bilateral heels with two pillows under bilateral calfs or total CAIR boots to prevent further soft tissue damage as pt is has been predominately bedbound or sitting on a chair while placing pressure to heels.    All additional care per Med appreciated  Patient demonstrated verbal understanding of all interventions and tolerated interventions well without any complications.       Case seen with attending Dr. Stovall

## 2024-11-20 NOTE — PROGRESS NOTE ADULT - SUBJECTIVE AND OBJECTIVE BOX
11/19/14: Pt seen by podiatry team with attending present. NAD. Resting comfortably.       PMH: Diabetes mellitus type II, controlled    Atrial fibrillation    Congestive heart failure    Dyspnea    Morbid obesity with BMI of 40.0-44.9, adult    Neuropathy    Peripheral venous insufficiency    Thyroid nodule    HTN (hypertension)    Cellulitis    At risk for sleep apnea      PSH:History of esophagogastroduodenoscopy (EGD)    H/O colonoscopy    Other complications of gastric band procedure    S/P left knee arthroscopy    Status post medial meniscus repair    History of cholecystectomy    S/P cataract surgery        Allergies:PC Pen VK (Rash)  latex (Unknown)  [This allergen will not trigger allergy alert] IV DYE, IODINE CONTRAST (Unknown)  penicillin (Hives; Urticaria)      Labs:                          8.2    8.12  )-----------( 365      ( 19 Nov 2024 09:01 )             26.8       11-19    135  |  107  |  20  ----------------------------<  124[H]  3.9   |  25  |  0.79    Ca    9.2      19 Nov 2024 09:01    TPro  7.1  /  Alb  3.0[L]  /  TBili  0.4  /  DBili  x   /  AST  10[L]  /  ALT  10[L]  /  AlkPhos  68  11-18    Vital Signs Last 24 Hrs  T(C): 36.9 (19 Nov 2024 07:23), Max: 36.9 (19 Nov 2024 07:23)  T(F): 98.4 (19 Nov 2024 07:23), Max: 98.4 (19 Nov 2024 07:23)  HR: 60 (19 Nov 2024 07:23) (53 - 72)  BP: 136/50 (19 Nov 2024 07:23) (99/87 - 154/50)  BP(mean): 69 (18 Nov 2024 21:48) (64 - 93)  RR: 17 (19 Nov 2024 07:23) (12 - 20)  SpO2: 98% (19 Nov 2024 07:23) (96% - 100%)    Parameters below as of 19 Nov 2024 07:23  Patient On (Oxygen Delivery Method): room air        REVIEW OF SYSTEMS:    CONSTITUTIONAL: No weakness, fevers or chills  EYES: No visual changes  RESPIRATORY: No cough, wheezing; No shortness of breath  CARDIOVASCULAR: No chest pain or palpitations  GASTROINTESTINAL: No abdominal or epigastric pain. No nausea, vomiting; No diarrhea or constipation.   GENITOURINARY: No dysuria, frequency or hematuria  NEUROLOGICAL: No numbness or weakness  SKIN: See physical examination.  All other review of systems is negative unless indicated above    Physical Exam:   Constitutional: NAD, alert;  Lower Extremity Focus  Derm:  Skin warm, dry and supple bilateral.    Ulceration Right heel wound, partial thickness in nature, wound base fibrogranular, wound size (approx 1.3 cm X 1.3cm X 0.1cm)edema, no stephen-wound erythema, no pus/purulence, no crepitus/fluctuance, no tracking/tunneling, no probe to bone.  Deroofed blister noted to posterior medial Left lower midleg, some serous drainage, superficial wound bed, no malodor,  no crepitus/fluctuance, no tracking/tunneling, no PTB.   Vascular: Dorsalis Pedis and Posterior Tibial pulses weakly palpable  Neuro: Protective sensation intact to the level of the digits bilateral.  MSK: Muscle strength 5/5 all major muscle groups bilateral. Pt is minimally ambulatory.           
HOSPITALIST ATTENDING PROGRESS NOTE    Chart and meds reviewed.  Patient seen and examined.    CC: sent in by wound care nurse at home    Subjective: Patient's main complaint is pain to the right lateral foot at the ER of the wound. Endorses some chills but no dysuria frequency or urgency    All other systems reviewed and found to be negative with the exception of what has been described above.    MEDICATIONS  (STANDING):  aMIOdarone    Tablet 200 milliGRAM(s) Oral daily  cefuroxime   Tablet 500 milliGRAM(s) Oral every 12 hours  cholecalciferol 1000 Unit(s) Oral daily  clopidogrel Tablet 75 milliGRAM(s) Oral daily  diltiazem    milliGRAM(s) Oral daily  ezetimibe 10 milliGRAM(s) Oral at bedtime  gabapentin 800 milliGRAM(s) Oral three times a day  influenza  Vaccine (HIGH DOSE) 0.5 milliLiter(s) IntraMuscular once  lactobacillus acidophilus 1 Tablet(s) Oral daily  montelukast 10 milliGRAM(s) Oral at bedtime  rivaroxaban      rivaroxaban 20 milliGRAM(s) Oral with dinner  spironolactone 25 milliGRAM(s) Oral daily    MEDICATIONS  (PRN):  acetaminophen     Tablet .. 650 milliGRAM(s) Oral once PRN Temp greater or equal to 38C (100.4F), Mild Pain (1 - 3)  aluminum hydroxide/magnesium hydroxide/simethicone Suspension 30 milliLiter(s) Oral every 4 hours PRN Dyspepsia  ondansetron Injectable 4 milliGRAM(s) IV Push every 8 hours PRN Nausea and/or Vomiting  oxyCODONE    IR 5 milliGRAM(s) Oral every 4 hours PRN Severe Pain (7 - 10)  zolpidem 5 milliGRAM(s) Oral at bedtime PRN Insomnia  zolpidem 5 milliGRAM(s) Oral at bedtime PRN Insomnia      VITALS:  T(F): 98.4 (11-19-24 @ 07:23), Max: 98.4 (11-19-24 @ 07:23)  HR: 60 (11-19-24 @ 07:23) (56 - 72)  BP: 136/50 (11-19-24 @ 07:23) (99/87 - 154/50)  RR: 17 (11-19-24 @ 07:23) (12 - 20)  SpO2: 98% (11-19-24 @ 07:23) (96% - 100%)  Wt(kg): --    I&O's Summary    18 Nov 2024 07:01  -  19 Nov 2024 07:00  --------------------------------------------------------  IN: 0 mL / OUT: 800 mL / NET: -800 mL        CAPILLARY BLOOD GLUCOSE          PHYSICAL EXAM:  Gen: NAD  HEENT:  pupils equal and reactive, EOMI, no oropharyngeal lesions, erythema, exudates, oral thrush  NECK:   supple, no carotid bruits, no palpable lymph nodes, no thyromegaly  CV:  +S1, +S2, regular, + systolic murmur  RESP:   lungs clear to auscultation bilaterally, no wheezing, rales, rhonchi, good air entry bilaterally  BREAST:  not examined  GI:  abdomen soft, non-tender, non-distended, normal BS, no bruits, no abdominal masses, no palpable masses  RECTAL:  not examined  : No suprapubic tenderness  MSK:   normal muscle tone, no atrophy, no rigidity, no contractions  EXT:  Bilateral stasis dermatitis.Right lateral heel with 1 x 1.5 cm partial-thickness wound with no active drainage or surrounding erythema.  Patient has tenderness to palpation around wound.  Left lower extremity with stasis dermatitis but no edema and no weeping  VASCULAR:  pulses equal and symmetric in the upper and lower extremities  NEURO:  AAOX3, no focal neurological deficits, follows all commands, able to move extremities spontaneously    LABS:                            8.2    8.12  )-----------( 365      ( 19 Nov 2024 09:01 )             26.8     11-19    135  |  107  |  20  ----------------------------<  124[H]  3.9   |  25  |  0.79    Ca    9.2      19 Nov 2024 09:01    TPro  7.1  /  Alb  3.0[L]  /  TBili  0.4  /  DBili  x   /  AST  10[L]  /  ALT  10[L]  /  AlkPhos  68  11-18        LIVER FUNCTIONS - ( 18 Nov 2024 14:12 )  Alb: 3.0 g/dL / Pro: 7.1 gm/dL / ALK PHOS: 68 U/L / ALT: 10 U/L / AST: 10 U/L / GGT: x           PT/INR - ( 18 Nov 2024 14:12 )   PT: 17.3 sec;   INR: 1.47 ratio         PTT - ( 18 Nov 2024 14:12 )  PTT:35.8 sec  Urinalysis Basic - ( 19 Nov 2024 09:01 )    Color: x / Appearance: x / SG: x / pH: x  Gluc: 124 mg/dL / Ketone: x  / Bili: x / Urobili: x   Blood: x / Protein: x / Nitrite: x   Leuk Esterase: x / RBC: x / WBC x   Sq Epi: x / Non Sq Epi: x / Bacteria: x              CULTURES:  Blood, Urine: Moderate (11-18 @ 15:14)  
11/20/14: Pt seen by podiatry team with attending present. NAD. Resting comfortably.       PMH: Diabetes mellitus type II, controlled    Atrial fibrillation    Congestive heart failure    Dyspnea    Morbid obesity with BMI of 40.0-44.9, adult    Neuropathy    Peripheral venous insufficiency    Thyroid nodule    HTN (hypertension)    Cellulitis    At risk for sleep apnea      PSH:History of esophagogastroduodenoscopy (EGD)    H/O colonoscopy    Other complications of gastric band procedure    S/P left knee arthroscopy    Status post medial meniscus repair    History of cholecystectomy    S/P cataract surgery        Allergies:PC Pen VK (Rash)  latex (Unknown)  [This allergen will not trigger allergy alert] IV DYE, IODINE CONTRAST (Unknown)  penicillin (Hives; Urticaria)      Labs:                                     8.2    8.12  )-----------( 365      ( 19 Nov 2024 09:01 )             26.8     11-19    135  |  107  |  20  ----------------------------<  124[H]  3.9   |  25  |  0.79    Ca    9.2      19 Nov 2024 09:01    TPro  7.1  /  Alb  3.0[L]  /  TBili  0.4  /  DBili  x   /  AST  10[L]  /  ALT  10[L]  /  AlkPhos  68  11-18    Vital Signs Last 24 Hrs  T(C): 36.7 (20 Nov 2024 07:18), Max: 36.8 (19 Nov 2024 17:22)  T(F): 98 (20 Nov 2024 07:18), Max: 98.2 (19 Nov 2024 17:22)  HR: 60 (20 Nov 2024 07:18) (60 - 66)  BP: 121/50 (20 Nov 2024 07:18) (119/45 - 130/48)  BP(mean): 63 (20 Nov 2024 03:55) (63 - 63)  RR: 17 (20 Nov 2024 07:18) (16 - 18)  SpO2: 95% (20 Nov 2024 07:18) (94% - 100%)    Parameters below as of 20 Nov 2024 07:18  Patient On (Oxygen Delivery Method): room air            REVIEW OF SYSTEMS:    CONSTITUTIONAL: No weakness, fevers or chills  EYES: No visual changes  RESPIRATORY: No cough, wheezing; No shortness of breath  CARDIOVASCULAR: No chest pain or palpitations  GASTROINTESTINAL: No abdominal or epigastric pain. No nausea, vomiting; No diarrhea or constipation.   GENITOURINARY: No dysuria, frequency or hematuria  NEUROLOGICAL: No numbness or weakness  SKIN: See physical examination.  All other review of systems is negative unless indicated above    Physical Exam:   Constitutional: NAD, alert;  Lower Extremity Focus  Derm:  Skin warm, dry and supple bilateral.    Ulceration Right heel wound, partial thickness in nature, wound base fibrogranular, wound size (approx 1.3 cm X 1.3cm X 0.1cm)edema, no stephen-wound erythema, no pus/purulence, no crepitus/fluctuance, no tracking/tunneling, no probe to bone.  Deroofed blister noted to posterior medial Left lower midleg, some serous drainage, superficial wound bed, no malodor,  no crepitus/fluctuance, no tracking/tunneling, no PTB.   Vascular: Dorsalis Pedis and Posterior Tibial pulses weakly palpable  Neuro: Protective sensation intact to the level of the digits bilateral.  MSK: Muscle strength 5/5 all major muscle groups bilateral. Pt is minimally ambulatory.           
Date of service: 11-20-24 @ 11:58      Patient lying in bed; has right heel pain, that is present on admission; afebrile, looking forward to going home      ROS: no fever or chills; denies dizziness, no HA, no SOB or cough, no abdominal pain, no diarrhea or constipation; no dysuria, no urinary frequency, no legs pain, no rashes    MEDICATIONS  (STANDING):  aMIOdarone    Tablet 200 milliGRAM(s) Oral daily  cefuroxime   Tablet 500 milliGRAM(s) Oral every 12 hours  cholecalciferol 1000 Unit(s) Oral daily  clopidogrel Tablet 75 milliGRAM(s) Oral daily  diltiazem    milliGRAM(s) Oral daily  ezetimibe 10 milliGRAM(s) Oral at bedtime  gabapentin 800 milliGRAM(s) Oral three times a day  influenza  Vaccine (HIGH DOSE) 0.5 milliLiter(s) IntraMuscular once  lactobacillus acidophilus 1 Tablet(s) Oral daily  montelukast 10 milliGRAM(s) Oral at bedtime  rivaroxaban      rivaroxaban 20 milliGRAM(s) Oral with dinner  spironolactone 25 milliGRAM(s) Oral daily    MEDICATIONS  (PRN):  acetaminophen     Tablet .. 650 milliGRAM(s) Oral once PRN Temp greater or equal to 38C (100.4F), Mild Pain (1 - 3)  aluminum hydroxide/magnesium hydroxide/simethicone Suspension 30 milliLiter(s) Oral every 4 hours PRN Dyspepsia  ondansetron Injectable 4 milliGRAM(s) IV Push every 8 hours PRN Nausea and/or Vomiting  oxyCODONE    IR 5 milliGRAM(s) Oral every 4 hours PRN Severe Pain (7 - 10)  zolpidem 5 milliGRAM(s) Oral at bedtime PRN Insomnia  zolpidem 5 milliGRAM(s) Oral at bedtime PRN Insomnia      Vital Signs Last 24 Hrs  T(C): 36.7 (20 Nov 2024 07:18), Max: 36.8 (19 Nov 2024 17:22)  T(F): 98 (20 Nov 2024 07:18), Max: 98.2 (19 Nov 2024 17:22)  HR: 60 (20 Nov 2024 07:18) (60 - 66)  BP: 121/50 (20 Nov 2024 07:18) (119/45 - 130/48)  BP(mean): 63 (20 Nov 2024 03:55) (63 - 63)  RR: 17 (20 Nov 2024 07:18) (16 - 18)  SpO2: 95% (20 Nov 2024 07:18) (94% - 100%)    Parameters below as of 20 Nov 2024 07:18  Patient On (Oxygen Delivery Method): room air            Physical Exam:      PE:    Constitutional: frail looking  HEENT: NC/AT, EOMI, PERRLA, conjunctivae clear; ears and nose atraumatic; pharynx clear  Neck: supple; thyroid not palpable  Back: no tenderness  Respiratory: respiratory effort normal; clear to auscultation  Cardiovascular: S1S2 regular, no murmurs  Abdomen: soft, not tender, not distended, positive BS; no liver or spleen organomegaly  Genitourinary: no suprapubic tenderness  Musculoskeletal: no muscle tenderness, venous stasis changes bilaterally, right heel with dry ulcer crusted  Neurological/ Psychiatric: AxOx3, judgement and insight normal;  moving all extremities  Skin: no rashes; no palpable lesions, healed scar under sternum, with pinpoint opening and scab    Labs: all available labs reviewed                        Labs:                        8.2    8.12  )-----------( 365      ( 19 Nov 2024 09:01 )             26.8     11-19    135  |  107  |  20  ----------------------------<  124[H]  3.9   |  25  |  0.79    Ca    9.2      19 Nov 2024 09:01    TPro  7.1  /  Alb  3.0[L]  /  TBili  0.4  /  DBili  x   /  AST  10[L]  /  ALT  10[L]  /  AlkPhos  68  11-18           Cultures:       Culture - Urine (collected 11-18-24 @ 15:14)  Source: .Urine None  Preliminary Report (11-19-24 @ 16:18):    >100,000 CFU/ml Escherichia coli    Urinalysis with Rflx Culture (collected 11-18-24 @ 15:14)    Culture - Blood (collected 11-18-24 @ 15:01)  Source: .Blood BLOOD  Preliminary Report (11-19-24 @ 20:00):    No growth at 24 hours    Culture - Blood (collected 11-18-24 @ 14:12)  Source: .Blood BLOOD  Preliminary Report (11-19-24 @ 20:00):    No growth at 24 hours      C-Reactive Protein: 9.2 mg/mL (11-18-24 @ 14:12)        < from: US Pelvis Complete (11.19.24 @ 08:18) >    ACC: [id number] EXAM:  US PELVIC COMPLETE   ORDERED BY: DARIEN NGO     PROCEDURE DATE:  11/19/2024          INTERPRETATION:  CLINICAL INFORMATION: Follow-up evaluation of new right   adnexal cyst on CT imaging.    LMP: Postmenopausal    COMPARISON: CT dated 11/18/2024.    TECHNIQUE:  Transabdominal pelvic sonogram only.    FINDINGS:  Uterus: Not visualized.      Right ovary: 2.2 x 1.8 x 2.1 cm sized complex cystic structure. This is   incompletely evaluated and characterized by transabdominal scanning.   Correlate with tumor markers and if needed either follow-up or MR imaging   for further evaluation.  Left ovary: Not visualized.    Fluid: None.    IMPRESSION:  2.2 cm sized complex cystic structure in the right adnexal region as   described above.      < end of copied text >        < from: CT Abdomen and Pelvis w/ IV Cont (11.18.24 @ 15:58) >    ACC: [id number] EXAM:  CT ABDOMEN AND PELVIS IC   ORDERED BY: JETT GAMEZ     PROCEDURE DATE:  11/18/2024          INTERPRETATION:  CLINICAL INFORMATION: Right lower quadrant abdominal   pain, tenderness.    COMPARISON: CT 2/2/2023    CONTRAST/COMPLICATIONS:  IV Contrast: Omnipaque 350  90 cc administered   0 cc discarded  Oral Contrast: NONE      PROCEDURE:  CT of the Abdomen and Pelvis was performed.  Sagittal and coronal reformats were performed.    FINDINGS:  LOWER CHEST: Coronary artery calcifications. Cardiomegaly.    LIVER: A 3.1 cm right hepatic mass is unchanged. Normal hepatic contour.  BILE DUCTS: Normal caliber.  GALLBLADDER: Cholecystectomy.  SPLEEN: Within normal limits.  PANCREAS: Within normal limits.  ADRENALS: Within normal limits.  KIDNEYS/URETERS: Small hypodense bilateral renal nodules are too small to   characterize. No hydronephrosis.    BLADDER: Within normal limits.  REPRODUCTIVE ORGANS: 1.9 cm right adnexal cyst is new since prior. Pelvic   ultrasound correlation suggested. Uterus and left adnexa within normal   limits.    BOWEL: Colonic diverticulosis without evidence of acute diverticulitis.   Small hiatal hernia. No bowel obstruction. Appendix is normal.  PERITONEUM/RETROPERITONEUM: No ascites orpneumoperitoneum.  VESSELS: Atherosclerotic changes.  LYMPH NODES: No lymphadenopathy.  ABDOMINAL WALL: Small fat-containing umbilical hernia. Surgical material   in the left upper anterior abdominal wall. This is unchanged.  BONES: Degenerative changes.    IMPRESSION:  Small right adnexal cyst, new since 2023. Pelvic ultrasound correlation   suggested. Otherwise, no acute findings.    < end of copied text >  < from: Xray Calcaneus, Right (11.18.24 @ 15:52) >    ACC: [id number] EXAM:  XR CALCANEOUS MIN 2 VIEWS RT   ORDERED BY:   JETT GAMEZ     PROCEDURE DATE:  11/18/2024          INTERPRETATION:  Clinical history: 74-year-old female, right heel ulcer,   rule out osteomyelitis.    Two views of the right calcaneus.    FINDINGS: Moderate degenerative change with no fracture, dislocation,   gross cortical destruction or soft tissue emphysema.    Prominent plantar calcaneal spur and small Achilles enthesophyte evident   on the lateral view.    IMPRESSION:    No gross cortical destruction or soft tissue emphysema. If there is   continued clinical concern follow-up MRI can be ordered      < end of copied text >          Radiology: all available radiological tests reviewed    Advanced directives addressed: full resuscitation

## 2024-11-20 NOTE — ED PROVIDER NOTE - NS_ATTENDINGSCRIBE_ED_ALL_ED
PHYSICAL THERAPY EVALUATION  Type of Visit: Evaluation       Fall Risk Screen:  Fall screen completed by: PT  Have you fallen 2 or more times in the past year?: No  Have you fallen and had an injury in the past year?: (Patient-Rptd) Yes  Is patient a fall risk?: No    Subjective         Presenting condition or subjective complaint: (Patient-Rptd) nerve sitution  Date of onset: 10/17/24    Relevant medical history:     PAST MEDICAL HISTORY: No past medical history on file.    PAST SURGICAL HISTORY:   Past Surgical History:   Procedure Laterality Date    ARTHROSCOPY ANKLE Right 6/21/2024    Procedure: Right Ankle Arthroscopy, Hardware Removal;  Surgeon: Toan Honeycutt DPM;  Location: [facility]    GYN SURGERY      bilateral tubal ligation       FAMILY HISTORY:   Family History   Problem Relation Age of Onset    Heart Disease Father        SOCIAL HISTORY:   Social History     Tobacco Use    Smoking status: Every Day     Current packs/day: 0.75     Average packs/day: 0.8 packs/day for 43.9 years (32.9 ttl pk-yrs)     Types: Cigarettes     Start date: 1/1/1981    Smokeless tobacco: Never    Tobacco comments:     Pt denies quit plan 10/17/24   Substance Use Topics    Alcohol use: Yes     Comment: [email or web address]         Prior diagnostic imaging/testing results:     Yes  XR TIBIA & FIBULA PORT RIGHT 2 VIEWS     HISTORY: 61 years Female 61-year-old with complaints of right foot  pain status post fall. Tenderness over medial and lateral malleolus.  Rule out tibia and fibula fracture     COMPARISON: Ankle x-ray same day     TECHNIQUE: Right tibia and fibula     FINDINGS: There is an oblique fracture of the proximal fibula  involving the neck and proximal diaphysis. The tibia is otherwise  intact.                                                                      IMPRESSION: Oblique proximal fibular fracture. Consider possibility of  Maisonneuve fracture with ligamentous injuries of the ankle.     ABUNDIO RODAS MD     Prior therapy  "history for the same diagnosis, illness or injury: (Patient-Rptd) Yes (Patient-Rptd) physical    Prior Level of Function  Transfers: Independent  Ambulation: Independent    Living Environment  Social support: (Patient-Rptd) Alone   Type of home: (Patient-Rptd) House   Stairs to enter the home: (Patient-Rptd) Yes (Patient-Rptd) 3 Is there a railing: (Patient-Rptd) Yes     Ramp: (Patient-Rptd) No   Stairs inside the home: (Patient-Rptd) Yes (Patient-Rptd) 10 Is there a railing: (Patient-Rptd) Yes     Help at home: (Patient-Rptd) None    Employment: (Patient-Rptd) Yes    Hobbies/Interests:  [employer]    Patient goals for therapy:  Reduced pain.  Patient feels good about function       Objective   FOOT/ANKLE EVALUATION  PAIN: Pain Level at Rest: 3/10  Pain Level with Use: 5/10  INTEGUMENTARY (edema, incisions):  Incision on medial side of left ankle.  Limited scar mobility.  POSTURE: WNL  GAIT:   Weightbearing Status: WBAT  Assistive Device(s): None  Gait Deviations: Antalgic  Stride length decreased  BALANCE/PROPRIOCEPTION: WFL  ROM: Diminished DF BL, diminished ankle inversion R>L, diminished greater toe DF/PF on R compared to L  STRENGTH: WFL, moderate disparity of MMT for ant tib and hallux on R compared to L (4/5 vs 5/5)  FLEXIBILITY: Diminished HS and gastroc groups BL  PALPATION: Marked sensitivity to light touch and with various instruments on R compared to L, apparent allodynia of surgical site; Surgical scare myofascial mobility restricted grossly  JOINT MOBILITY: Hypomobile talocrural glide R>L  VIBRATION SENSE:  L: WNL R: could feel vibration but it felt \"weird\" on navicular and lateral malleolus.    Assessment & Plan   CLINICAL IMPRESSIONS  Medical Diagnosis: Neuropathy of right foot  Edema of right lower extremity  History of fracture of right ankle  Gastrocnemius equinus of right lower extremity  Gastrocnemius equinus of left lower extremity    Treatment Diagnosis: Right ankle pain "   Impression/Assessment: Patient is a 62 year old female with right ankle pain complaints.  The following significant findings have been identified: Pain, Decreased ROM/flexibility, Decreased joint mobility, Decreased strength, Impaired gait, Impaired muscle performance, and Decreased activity tolerance. These impairments interfere with their ability to perform self care tasks, work tasks, recreational activities, household chores, household mobility, and community mobility as compared to previous level of function.     Clinical Decision Making (Complexity):  Clinical Presentation: Stable/Uncomplicated  Clinical Presentation Rationale: based on medical and personal factors listed in PT evaluation  Clinical Decision Making (Complexity): Low complexity    PLAN OF CARE  Treatment Interventions:  Modalities: Cryotherapy, Dry Needling, E-stim, Hot Pack, Paraffin, Vasoneumatic Device  Interventions: Gait Training, Manual Therapy, Neuromuscular Re-education, Therapeutic Activity, Therapeutic Exercise, Self-Care/Home Management    Long Term Goals     PT Goal 1  Goal Identifier: STG 1  Goal Description: Patient will be independent with a short-term home exercise program.  Target Date: 12/18/24  PT Goal 2  Goal Identifier: STG 2  Goal Description: Patient will understand and demonstrate improved posture and techniques such that patient places less strain over foot and supporting musculature.  Target Date: 12/18/24  PT Goal 3  Goal Identifier: LTG 1  Goal Description: Improve score on utilized outcome measures to correlate with clinically significant change.  Target Date: 02/12/25  PT Goal 4  Goal Identifier: LTG 2  Goal Description: Patient will endorse confidence in ability to manage home exercise program independently to promote continued progression and management of foot pain symptoms following discharge from PT services.  Target Date: 02/12/25      Frequency of Treatment: 1x/week tapered to discharge  Duration of  Treatment: 12 weeks    Education Assessment:   Learner/Method: Patient;Listening;Reading;Demonstration;Pictures/Video;No Barriers to Learning    Risks and benefits of evaluation/treatment have been explained.   Patient/Family/caregiver agrees with Plan of Care.     Evaluation Time:     PT Eval, Low Complexity Minutes (79603): 20  Evaluation Only     Signing Clinician: Marlene Lou PT             I personally performed the service described in the documentation recorded by the scribe in my presence, and it accurately and completely records my words and actions.

## 2024-11-27 DIAGNOSIS — I83.93 ASYMPTOMATIC VARICOSE VEINS OF BILATERAL LOWER EXTREMITIES: ICD-10-CM

## 2024-11-27 DIAGNOSIS — L97.419 NON-PRESSURE CHRONIC ULCER OF RIGHT HEEL AND MIDFOOT WITH UNSPECIFIED SEVERITY: ICD-10-CM

## 2024-11-27 DIAGNOSIS — Z88.0 ALLERGY STATUS TO PENICILLIN: ICD-10-CM

## 2024-11-27 DIAGNOSIS — E11.621 TYPE 2 DIABETES MELLITUS WITH FOOT ULCER: ICD-10-CM

## 2024-11-27 DIAGNOSIS — E11.40 TYPE 2 DIABETES MELLITUS WITH DIABETIC NEUROPATHY, UNSPECIFIED: ICD-10-CM

## 2024-11-27 DIAGNOSIS — Z79.84 LONG TERM (CURRENT) USE OF ORAL HYPOGLYCEMIC DRUGS: ICD-10-CM

## 2024-11-27 DIAGNOSIS — N39.0 URINARY TRACT INFECTION, SITE NOT SPECIFIED: ICD-10-CM

## 2024-11-27 DIAGNOSIS — E66.01 MORBID (SEVERE) OBESITY DUE TO EXCESS CALORIES: ICD-10-CM

## 2024-11-27 DIAGNOSIS — N83.291 OTHER OVARIAN CYST, RIGHT SIDE: ICD-10-CM

## 2024-11-27 DIAGNOSIS — Z79.02 LONG TERM (CURRENT) USE OF ANTITHROMBOTICS/ANTIPLATELETS: ICD-10-CM

## 2024-11-27 DIAGNOSIS — I89.0 LYMPHEDEMA, NOT ELSEWHERE CLASSIFIED: ICD-10-CM

## 2024-11-27 DIAGNOSIS — R53.81 OTHER MALAISE: ICD-10-CM

## 2024-11-27 DIAGNOSIS — I11.0 HYPERTENSIVE HEART DISEASE WITH HEART FAILURE: ICD-10-CM

## 2024-11-27 DIAGNOSIS — I48.91 UNSPECIFIED ATRIAL FIBRILLATION: ICD-10-CM

## 2024-11-27 DIAGNOSIS — Z91.040 LATEX ALLERGY STATUS: ICD-10-CM

## 2024-11-27 DIAGNOSIS — E11.51 TYPE 2 DIABETES MELLITUS WITH DIABETIC PERIPHERAL ANGIOPATHY WITHOUT GANGRENE: ICD-10-CM

## 2024-11-27 DIAGNOSIS — I87.8 OTHER SPECIFIED DISORDERS OF VEINS: ICD-10-CM

## 2024-11-27 DIAGNOSIS — Z79.52 LONG TERM (CURRENT) USE OF SYSTEMIC STEROIDS: ICD-10-CM

## 2024-11-27 DIAGNOSIS — Z79.01 LONG TERM (CURRENT) USE OF ANTICOAGULANTS: ICD-10-CM

## 2024-11-27 DIAGNOSIS — I50.9 HEART FAILURE, UNSPECIFIED: ICD-10-CM

## 2024-12-04 ENCOUNTER — INPATIENT (INPATIENT)
Facility: HOSPITAL | Age: 74
LOS: 14 days | Discharge: HOME | DRG: 948 | End: 2024-12-19
Attending: STUDENT IN AN ORGANIZED HEALTH CARE EDUCATION/TRAINING PROGRAM | Admitting: STUDENT IN AN ORGANIZED HEALTH CARE EDUCATION/TRAINING PROGRAM
Payer: MEDICARE

## 2024-12-04 ENCOUNTER — OUTPATIENT (OUTPATIENT)
Dept: OUTPATIENT SERVICES | Facility: HOSPITAL | Age: 74
LOS: 1 days | End: 2024-12-04
Payer: MEDICARE

## 2024-12-04 VITALS — WEIGHT: 251.99 LBS | OXYGEN SATURATION: 97 %

## 2024-12-04 DIAGNOSIS — Z98.890 OTHER SPECIFIED POSTPROCEDURAL STATES: Chronic | ICD-10-CM

## 2024-12-04 DIAGNOSIS — Z98.49 CATARACT EXTRACTION STATUS, UNSPECIFIED EYE: Chronic | ICD-10-CM

## 2024-12-04 DIAGNOSIS — L03.116 CELLULITIS OF LEFT LOWER LIMB: ICD-10-CM

## 2024-12-04 DIAGNOSIS — L97.411 NON-PRESSURE CHRONIC ULCER OF RIGHT HEEL AND MIDFOOT LIMITED TO BREAKDOWN OF SKIN: ICD-10-CM

## 2024-12-04 DIAGNOSIS — I07.1 RHEUMATIC TRICUSPID INSUFFICIENCY: ICD-10-CM

## 2024-12-04 DIAGNOSIS — K95.09 OTHER COMPLICATIONS OF GASTRIC BAND PROCEDURE: Chronic | ICD-10-CM

## 2024-12-04 DIAGNOSIS — E87.6 HYPOKALEMIA: ICD-10-CM

## 2024-12-04 DIAGNOSIS — I27.20 PULMONARY HYPERTENSION, UNSPECIFIED: ICD-10-CM

## 2024-12-04 DIAGNOSIS — I87.8 OTHER SPECIFIED DISORDERS OF VEINS: ICD-10-CM

## 2024-12-04 DIAGNOSIS — Z95.3 PRESENCE OF XENOGENIC HEART VALVE: ICD-10-CM

## 2024-12-04 DIAGNOSIS — I70.8 ATHEROSCLEROSIS OF OTHER ARTERIES: ICD-10-CM

## 2024-12-04 DIAGNOSIS — E44.0 MODERATE PROTEIN-CALORIE MALNUTRITION: ICD-10-CM

## 2024-12-04 DIAGNOSIS — Z88.8 ALLERGY STATUS TO OTHER DRUGS, MEDICAMENTS AND BIOLOGICAL SUBSTANCES: ICD-10-CM

## 2024-12-04 DIAGNOSIS — E87.1 HYPO-OSMOLALITY AND HYPONATREMIA: ICD-10-CM

## 2024-12-04 DIAGNOSIS — I50.33 ACUTE ON CHRONIC DIASTOLIC (CONGESTIVE) HEART FAILURE: ICD-10-CM

## 2024-12-04 DIAGNOSIS — R53.81 OTHER MALAISE: ICD-10-CM

## 2024-12-04 DIAGNOSIS — I70.234 ATHEROSCLEROSIS OF NATIVE ARTERIES OF RIGHT LEG WITH ULCERATION OF HEEL AND MIDFOOT: ICD-10-CM

## 2024-12-04 DIAGNOSIS — I11.0 HYPERTENSIVE HEART DISEASE WITH HEART FAILURE: ICD-10-CM

## 2024-12-04 DIAGNOSIS — R00.1 BRADYCARDIA, UNSPECIFIED: ICD-10-CM

## 2024-12-04 DIAGNOSIS — I83.014 VARICOSE VEINS OF RIGHT LOWER EXTREMITY WITH ULCER OF HEEL AND MIDFOOT: ICD-10-CM

## 2024-12-04 DIAGNOSIS — I70.248 ATHEROSCLEROSIS OF NATIVE ARTERIES OF LEFT LEG WITH ULCERATION OF OTHER PART OF LOWER LEG: ICD-10-CM

## 2024-12-04 DIAGNOSIS — R60.0 LOCALIZED EDEMA: ICD-10-CM

## 2024-12-04 DIAGNOSIS — I70.92 CHRONIC TOTAL OCCLUSION OF ARTERY OF THE EXTREMITIES: ICD-10-CM

## 2024-12-04 DIAGNOSIS — F17.210 NICOTINE DEPENDENCE, CIGARETTES, UNCOMPLICATED: ICD-10-CM

## 2024-12-04 DIAGNOSIS — Z90.49 ACQUIRED ABSENCE OF OTHER SPECIFIED PARTS OF DIGESTIVE TRACT: Chronic | ICD-10-CM

## 2024-12-04 DIAGNOSIS — N39.0 URINARY TRACT INFECTION, SITE NOT SPECIFIED: ICD-10-CM

## 2024-12-04 DIAGNOSIS — Z79.02 LONG TERM (CURRENT) USE OF ANTITHROMBOTICS/ANTIPLATELETS: ICD-10-CM

## 2024-12-04 DIAGNOSIS — D64.9 ANEMIA, UNSPECIFIED: ICD-10-CM

## 2024-12-04 DIAGNOSIS — E04.1 NONTOXIC SINGLE THYROID NODULE: ICD-10-CM

## 2024-12-04 DIAGNOSIS — E78.5 HYPERLIPIDEMIA, UNSPECIFIED: ICD-10-CM

## 2024-12-04 DIAGNOSIS — B96.20 UNSPECIFIED ESCHERICHIA COLI [E. COLI] AS THE CAUSE OF DISEASES CLASSIFIED ELSEWHERE: ICD-10-CM

## 2024-12-04 DIAGNOSIS — Z88.0 ALLERGY STATUS TO PENICILLIN: ICD-10-CM

## 2024-12-04 DIAGNOSIS — I48.20 CHRONIC ATRIAL FIBRILLATION, UNSPECIFIED: ICD-10-CM

## 2024-12-04 DIAGNOSIS — E11.51 TYPE 2 DIABETES MELLITUS WITH DIABETIC PERIPHERAL ANGIOPATHY WITHOUT GANGRENE: ICD-10-CM

## 2024-12-04 DIAGNOSIS — L97.921 NON-PRESSURE CHRONIC ULCER OF UNSPECIFIED PART OF LEFT LOWER LEG LIMITED TO BREAKDOWN OF SKIN: ICD-10-CM

## 2024-12-04 DIAGNOSIS — I83.028 VARICOSE VEINS OF LEFT LOWER EXTREMITY WITH ULCER OTHER PART OF LOWER LEG: ICD-10-CM

## 2024-12-04 DIAGNOSIS — E66.01 MORBID (SEVERE) OBESITY DUE TO EXCESS CALORIES: ICD-10-CM

## 2024-12-04 DIAGNOSIS — L03.115 CELLULITIS OF RIGHT LOWER LIMB: ICD-10-CM

## 2024-12-04 DIAGNOSIS — Z79.01 LONG TERM (CURRENT) USE OF ANTICOAGULANTS: ICD-10-CM

## 2024-12-04 DIAGNOSIS — E11.621 TYPE 2 DIABETES MELLITUS WITH FOOT ULCER: ICD-10-CM

## 2024-12-04 DIAGNOSIS — Z91.040 LATEX ALLERGY STATUS: ICD-10-CM

## 2024-12-04 DIAGNOSIS — Z16.12 EXTENDED SPECTRUM BETA LACTAMASE (ESBL) RESISTANCE: ICD-10-CM

## 2024-12-04 LAB
ALBUMIN SERPL ELPH-MCNC: 2.9 G/DL — LOW (ref 3.3–5)
ALP SERPL-CCNC: 83 U/L — SIGNIFICANT CHANGE UP (ref 40–120)
ALT FLD-CCNC: 8 U/L — LOW (ref 12–78)
ANION GAP SERPL CALC-SCNC: 3 MMOL/L — LOW (ref 5–17)
APTT BLD: 48.8 SEC — HIGH (ref 24.5–35.6)
AST SERPL-CCNC: 9 U/L — LOW (ref 15–37)
BASOPHILS # BLD AUTO: 0.08 K/UL — SIGNIFICANT CHANGE UP (ref 0–0.2)
BASOPHILS NFR BLD AUTO: 1 % — SIGNIFICANT CHANGE UP (ref 0–2)
BILIRUB SERPL-MCNC: 0.4 MG/DL — SIGNIFICANT CHANGE UP (ref 0.2–1.2)
BUN SERPL-MCNC: 17 MG/DL — SIGNIFICANT CHANGE UP (ref 7–23)
CALCIUM SERPL-MCNC: 8.9 MG/DL — SIGNIFICANT CHANGE UP (ref 8.5–10.1)
CHLORIDE SERPL-SCNC: 105 MMOL/L — SIGNIFICANT CHANGE UP (ref 96–108)
CO2 SERPL-SCNC: 26 MMOL/L — SIGNIFICANT CHANGE UP (ref 22–31)
CREAT SERPL-MCNC: 0.84 MG/DL — SIGNIFICANT CHANGE UP (ref 0.5–1.3)
EGFR: 72 ML/MIN/1.73M2 — SIGNIFICANT CHANGE UP
EOSINOPHIL # BLD AUTO: 0.13 K/UL — SIGNIFICANT CHANGE UP (ref 0–0.5)
EOSINOPHIL NFR BLD AUTO: 1.6 % — SIGNIFICANT CHANGE UP (ref 0–6)
FLUAV AG NPH QL: SIGNIFICANT CHANGE UP
FLUBV AG NPH QL: SIGNIFICANT CHANGE UP
GLUCOSE SERPL-MCNC: 98 MG/DL — SIGNIFICANT CHANGE UP (ref 70–99)
HCT VFR BLD CALC: 24.9 % — LOW (ref 34.5–45)
HGB BLD-MCNC: 7.5 G/DL — LOW (ref 11.5–15.5)
IMM GRANULOCYTES NFR BLD AUTO: 0.3 % — SIGNIFICANT CHANGE UP (ref 0–0.9)
INR BLD: 3.41 RATIO — HIGH (ref 0.85–1.16)
LYMPHOCYTES # BLD AUTO: 1.13 K/UL — SIGNIFICANT CHANGE UP (ref 1–3.3)
LYMPHOCYTES # BLD AUTO: 14.3 % — SIGNIFICANT CHANGE UP (ref 13–44)
MAGNESIUM SERPL-MCNC: 1.8 MG/DL — SIGNIFICANT CHANGE UP (ref 1.6–2.6)
MAGNESIUM SERPL-MCNC: 1.8 MG/DL — SIGNIFICANT CHANGE UP (ref 1.6–2.6)
MCHC RBC-ENTMCNC: 26.7 PG — LOW (ref 27–34)
MCHC RBC-ENTMCNC: 30.1 G/DL — LOW (ref 32–36)
MCV RBC AUTO: 88.6 FL — SIGNIFICANT CHANGE UP (ref 80–100)
MONOCYTES # BLD AUTO: 0.86 K/UL — SIGNIFICANT CHANGE UP (ref 0–0.9)
MONOCYTES NFR BLD AUTO: 10.9 % — SIGNIFICANT CHANGE UP (ref 2–14)
NEUTROPHILS # BLD AUTO: 5.69 K/UL — SIGNIFICANT CHANGE UP (ref 1.8–7.4)
NEUTROPHILS NFR BLD AUTO: 71.9 % — SIGNIFICANT CHANGE UP (ref 43–77)
NT-PROBNP SERPL-SCNC: 1797 PG/ML — HIGH (ref 0–450)
PLATELET # BLD AUTO: 335 K/UL — SIGNIFICANT CHANGE UP (ref 150–400)
POTASSIUM SERPL-MCNC: 4.6 MMOL/L — SIGNIFICANT CHANGE UP (ref 3.5–5.3)
POTASSIUM SERPL-SCNC: 4.6 MMOL/L — SIGNIFICANT CHANGE UP (ref 3.5–5.3)
PROT SERPL-MCNC: 7.1 GM/DL — SIGNIFICANT CHANGE UP (ref 6–8.3)
PROTHROM AB SERPL-ACNC: 39.8 SEC — HIGH (ref 9.9–13.4)
RBC # BLD: 2.81 M/UL — LOW (ref 3.8–5.2)
RBC # FLD: 17.2 % — HIGH (ref 10.3–14.5)
RSV RNA NPH QL NAA+NON-PROBE: SIGNIFICANT CHANGE UP
SARS-COV-2 RNA SPEC QL NAA+PROBE: SIGNIFICANT CHANGE UP
SODIUM SERPL-SCNC: 134 MMOL/L — LOW (ref 135–145)
TROPONIN I, HIGH SENSITIVITY RESULT: 11.96 NG/L — SIGNIFICANT CHANGE UP
WBC # BLD: 7.91 K/UL — SIGNIFICANT CHANGE UP (ref 3.8–10.5)
WBC # FLD AUTO: 7.91 K/UL — SIGNIFICANT CHANGE UP (ref 3.8–10.5)

## 2024-12-04 PROCEDURE — 88311 DECALCIFY TISSUE: CPT

## 2024-12-04 PROCEDURE — 82728 ASSAY OF FERRITIN: CPT

## 2024-12-04 PROCEDURE — 97164 PT RE-EVAL EST PLAN CARE: CPT | Mod: GP

## 2024-12-04 PROCEDURE — 81001 URINALYSIS AUTO W/SCOPE: CPT

## 2024-12-04 PROCEDURE — C9290: CPT

## 2024-12-04 PROCEDURE — 93970 EXTREMITY STUDY: CPT

## 2024-12-04 PROCEDURE — 93970 EXTREMITY STUDY: CPT | Mod: 26

## 2024-12-04 PROCEDURE — 84100 ASSAY OF PHOSPHORUS: CPT

## 2024-12-04 PROCEDURE — 73700 CT LOWER EXTREMITY W/O DYE: CPT | Mod: MC,50

## 2024-12-04 PROCEDURE — 82962 GLUCOSE BLOOD TEST: CPT

## 2024-12-04 PROCEDURE — 75710 ARTERY X-RAYS ARM/LEG: CPT

## 2024-12-04 PROCEDURE — 97163 PT EVAL HIGH COMPLEX 45 MIN: CPT | Mod: GP

## 2024-12-04 PROCEDURE — C1760: CPT

## 2024-12-04 PROCEDURE — C9399: CPT

## 2024-12-04 PROCEDURE — 36415 COLL VENOUS BLD VENIPUNCTURE: CPT

## 2024-12-04 PROCEDURE — 86901 BLOOD TYPING SEROLOGIC RH(D): CPT

## 2024-12-04 PROCEDURE — 99223 1ST HOSP IP/OBS HIGH 75: CPT

## 2024-12-04 PROCEDURE — C1889: CPT

## 2024-12-04 PROCEDURE — 36246 INS CATH ABD/L-EXT ART 2ND: CPT

## 2024-12-04 PROCEDURE — 82607 VITAMIN B-12: CPT

## 2024-12-04 PROCEDURE — 76000 FLUOROSCOPY <1 HR PHYS/QHP: CPT

## 2024-12-04 PROCEDURE — 86923 COMPATIBILITY TEST ELECTRIC: CPT

## 2024-12-04 PROCEDURE — 87640 STAPH A DNA AMP PROBE: CPT

## 2024-12-04 PROCEDURE — 97116 GAIT TRAINING THERAPY: CPT | Mod: GP

## 2024-12-04 PROCEDURE — 82746 ASSAY OF FOLIC ACID SERUM: CPT

## 2024-12-04 PROCEDURE — C1894: CPT

## 2024-12-04 PROCEDURE — 85025 COMPLETE CBC W/AUTO DIFF WBC: CPT

## 2024-12-04 PROCEDURE — 99285 EMERGENCY DEPT VISIT HI MDM: CPT | Mod: FS

## 2024-12-04 PROCEDURE — 99213 OFFICE O/P EST LOW 20 MIN: CPT

## 2024-12-04 PROCEDURE — 36430 TRANSFUSION BLD/BLD COMPNT: CPT

## 2024-12-04 PROCEDURE — 80048 BASIC METABOLIC PNL TOTAL CA: CPT

## 2024-12-04 PROCEDURE — P9016: CPT

## 2024-12-04 PROCEDURE — 87040 BLOOD CULTURE FOR BACTERIA: CPT

## 2024-12-04 PROCEDURE — 85027 COMPLETE CBC AUTOMATED: CPT

## 2024-12-04 PROCEDURE — C1887: CPT

## 2024-12-04 PROCEDURE — 80053 COMPREHEN METABOLIC PANEL: CPT

## 2024-12-04 PROCEDURE — 93925 LOWER EXTREMITY STUDY: CPT

## 2024-12-04 PROCEDURE — 87086 URINE CULTURE/COLONY COUNT: CPT

## 2024-12-04 PROCEDURE — 99204 OFFICE O/P NEW MOD 45 MIN: CPT

## 2024-12-04 PROCEDURE — C1768: CPT

## 2024-12-04 PROCEDURE — 88304 TISSUE EXAM BY PATHOLOGIST: CPT

## 2024-12-04 PROCEDURE — 83735 ASSAY OF MAGNESIUM: CPT

## 2024-12-04 PROCEDURE — 75635 CT ANGIO ABDOMINAL ARTERIES: CPT | Mod: MC

## 2024-12-04 PROCEDURE — C2623: CPT

## 2024-12-04 PROCEDURE — 83540 ASSAY OF IRON: CPT

## 2024-12-04 PROCEDURE — 97530 THERAPEUTIC ACTIVITIES: CPT | Mod: GP

## 2024-12-04 PROCEDURE — 87186 SC STD MICRODIL/AGAR DIL: CPT

## 2024-12-04 PROCEDURE — 71045 X-RAY EXAM CHEST 1 VIEW: CPT | Mod: 26

## 2024-12-04 PROCEDURE — 85730 THROMBOPLASTIN TIME PARTIAL: CPT

## 2024-12-04 PROCEDURE — 93923 UPR/LXTR ART STDY 3+ LVLS: CPT

## 2024-12-04 PROCEDURE — 86850 RBC ANTIBODY SCREEN: CPT

## 2024-12-04 PROCEDURE — 87077 CULTURE AEROBIC IDENTIFY: CPT

## 2024-12-04 PROCEDURE — 83550 IRON BINDING TEST: CPT

## 2024-12-04 PROCEDURE — 85610 PROTHROMBIN TIME: CPT

## 2024-12-04 PROCEDURE — 86900 BLOOD TYPING SEROLOGIC ABO: CPT

## 2024-12-04 PROCEDURE — C1769: CPT

## 2024-12-04 PROCEDURE — 71045 X-RAY EXAM CHEST 1 VIEW: CPT

## 2024-12-04 PROCEDURE — 93306 TTE W/DOPPLER COMPLETE: CPT

## 2024-12-04 PROCEDURE — C1725: CPT

## 2024-12-04 PROCEDURE — 87641 MR-STAPH DNA AMP PROBE: CPT

## 2024-12-04 RX ORDER — AMIODARONE HYDROCHLORIDE 200 MG/1
200 TABLET ORAL DAILY
Refills: 0 | Status: DISCONTINUED | OUTPATIENT
Start: 2024-12-04 | End: 2024-12-19

## 2024-12-04 RX ORDER — METOPROLOL TARTRATE 100 MG/1
25 TABLET, FILM COATED ORAL DAILY
Refills: 0 | Status: DISCONTINUED | OUTPATIENT
Start: 2024-12-04 | End: 2024-12-19

## 2024-12-04 RX ORDER — GABAPENTIN 300 MG/1
800 CAPSULE ORAL ONCE
Refills: 0 | Status: COMPLETED | OUTPATIENT
Start: 2024-12-04 | End: 2024-12-04

## 2024-12-04 RX ORDER — ACETAMINOPHEN, DIPHENHYDRAMINE HCL, PHENYLEPHRINE HCL 325; 25; 5 MG/1; MG/1; MG/1
3 TABLET ORAL AT BEDTIME
Refills: 0 | Status: DISCONTINUED | OUTPATIENT
Start: 2024-12-04 | End: 2024-12-18

## 2024-12-04 RX ORDER — OXYCODONE HYDROCHLORIDE 30 MG/1
5 TABLET ORAL AT BEDTIME
Refills: 0 | Status: DISCONTINUED | OUTPATIENT
Start: 2024-12-04 | End: 2024-12-05

## 2024-12-04 RX ORDER — MONTELUKAST SODIUM 10 MG/1
10 TABLET ORAL AT BEDTIME
Refills: 0 | Status: DISCONTINUED | OUTPATIENT
Start: 2024-12-04 | End: 2024-12-19

## 2024-12-04 RX ORDER — CLOPIDOGREL 75 MG/1
75 TABLET, FILM COATED ORAL DAILY
Refills: 0 | Status: DISCONTINUED | OUTPATIENT
Start: 2024-12-05 | End: 2024-12-19

## 2024-12-04 RX ORDER — FUROSEMIDE 40 MG/1
40 TABLET ORAL ONCE
Refills: 0 | Status: COMPLETED | OUTPATIENT
Start: 2024-12-04 | End: 2024-12-04

## 2024-12-04 RX ORDER — FUROSEMIDE 40 MG/1
40 TABLET ORAL DAILY
Refills: 0 | Status: DISCONTINUED | OUTPATIENT
Start: 2024-12-05 | End: 2024-12-09

## 2024-12-04 RX ORDER — RIVAROXABAN 10 MG/1
20 TABLET, FILM COATED ORAL
Refills: 0 | Status: DISCONTINUED | OUTPATIENT
Start: 2024-12-05 | End: 2024-12-10

## 2024-12-04 RX ORDER — EZETIMIBE 10 MG
10 TABLET ORAL DAILY
Refills: 0 | Status: DISCONTINUED | OUTPATIENT
Start: 2024-12-04 | End: 2024-12-19

## 2024-12-04 RX ORDER — INFLUENZA VIRUS VACCINE 15; 15; 15; 15 UG/.5ML; UG/.5ML; UG/.5ML; UG/.5ML
0.5 SUSPENSION INTRAMUSCULAR ONCE
Refills: 0 | Status: DISCONTINUED | OUTPATIENT
Start: 2024-12-04 | End: 2024-12-19

## 2024-12-04 RX ORDER — ACETAMINOPHEN 500MG 500 MG/1
650 TABLET, COATED ORAL EVERY 6 HOURS
Refills: 0 | Status: DISCONTINUED | OUTPATIENT
Start: 2024-12-04 | End: 2024-12-19

## 2024-12-04 RX ORDER — ONDANSETRON HYDROCHLORIDE 4 MG/1
4 TABLET, FILM COATED ORAL EVERY 8 HOURS
Refills: 0 | Status: DISCONTINUED | OUTPATIENT
Start: 2024-12-04 | End: 2024-12-19

## 2024-12-04 RX ORDER — MAGNESIUM, ALUMINUM HYDROXIDE 200-225/5
30 SUSPENSION, ORAL (FINAL DOSE FORM) ORAL EVERY 4 HOURS
Refills: 0 | Status: DISCONTINUED | OUTPATIENT
Start: 2024-12-04 | End: 2024-12-19

## 2024-12-04 RX ORDER — GABAPENTIN 300 MG/1
800 CAPSULE ORAL THREE TIMES A DAY
Refills: 0 | Status: DISCONTINUED | OUTPATIENT
Start: 2024-12-04 | End: 2024-12-19

## 2024-12-04 RX ORDER — CHOLECALCIFEROL (VITAMIN D3) 10MCG/0.25
1000 DROPS ORAL DAILY
Refills: 0 | Status: DISCONTINUED | OUTPATIENT
Start: 2024-12-04 | End: 2024-12-19

## 2024-12-04 RX ORDER — ACETAMINOPHEN 500MG 500 MG/1
650 TABLET, COATED ORAL ONCE
Refills: 0 | Status: COMPLETED | OUTPATIENT
Start: 2024-12-04 | End: 2024-12-04

## 2024-12-04 RX ORDER — ZOLPIDEM TARTRATE 10 MG/1
5 TABLET ORAL AT BEDTIME
Refills: 0 | Status: DISCONTINUED | OUTPATIENT
Start: 2024-12-04 | End: 2024-12-11

## 2024-12-04 RX ADMIN — MONTELUKAST SODIUM 10 MILLIGRAM(S): 10 TABLET ORAL at 21:21

## 2024-12-04 RX ADMIN — ACETAMINOPHEN 500MG 650 MILLIGRAM(S): 500 TABLET, COATED ORAL at 15:27

## 2024-12-04 RX ADMIN — OXYCODONE HYDROCHLORIDE 5 MILLIGRAM(S): 30 TABLET ORAL at 19:20

## 2024-12-04 RX ADMIN — FUROSEMIDE 40 MILLIGRAM(S): 40 TABLET ORAL at 17:24

## 2024-12-04 RX ADMIN — OXYCODONE HYDROCHLORIDE 5 MILLIGRAM(S): 30 TABLET ORAL at 18:20

## 2024-12-04 RX ADMIN — ACETAMINOPHEN 500MG 650 MILLIGRAM(S): 500 TABLET, COATED ORAL at 16:30

## 2024-12-04 RX ADMIN — GABAPENTIN 800 MILLIGRAM(S): 300 CAPSULE ORAL at 23:38

## 2024-12-04 RX ADMIN — Medication 10 MILLIGRAM(S): at 21:21

## 2024-12-04 RX ADMIN — FUROSEMIDE 40 MILLIGRAM(S): 40 TABLET ORAL at 16:10

## 2024-12-04 NOTE — ED ADULT NURSE NOTE - OBJECTIVE STATEMENT
Post-Care Instructions: I reviewed with the patient in detail post-care instructions. Patient is to wear sunprotection, and avoid picking at any of the treated lesions. Pt may apply Vaseline to crusted or scabbing areas. 73 y/o female sent in by MD for increased SOB on exertion and lower extremity swelling. Pt was receiving wound care for wound on R heel. Healing wound also noted to L calf. Pt states that her SOB worsens when she walks or lays down. Denies chest pain, n/v/d, fevers. Pt takes lasix daily.  PMHx CHF, afib

## 2024-12-04 NOTE — ED ADULT TRIAGE NOTE - CHIEF COMPLAINT QUOTE
PT presents to er sent over from wound care clinic for bilateral LE edema, denies fevers, chest pain, complains of mild SOB since this morning, pt speaking in clear/full sentances.

## 2024-12-04 NOTE — ED PROVIDER NOTE - CLINICAL SUMMARY MEDICAL DECISION MAKING FREE TEXT BOX
73 y/o F with PMHx DM type II , TAVR 8/2024,  A-fib on Xarelto, CHF, HTN, HLD, PVD,  presents to ED sent over from wound care clinic c/o  b/l LE swelling which has worsened over the past week. Pt states she is on Lasix and denies missing doses  Pt states she is on blood thinners. Endorses SOB. Denies chest pain, abd pain, fevers, wet cough, sore throat. Pt states she was a smoker 30 years ago. Pt states she ambulates with a cane and ambulation has become more difficult. Denies having oxygen at home.  Cardiologist-Dr. Mayer    Pt with SOB. Concern for fluid overload. Rule out pneumonia. Wounds do not look not acutely infected. Consult wound care. 73 y/o F with PMHx DM type II , TAVR 8/2024,  A-fib on Xarelto, CHF, HTN, HLD, PVD,  presents to ED sent over from wound care clinic c/o  b/l LE swelling which has worsened over the past week. Pt states she is on Lasix and denies missing doses  Pt states she is on blood thinners. Endorses SOB. Denies chest pain, abd pain, fevers, wet cough, sore throat. Pt states she was a smoker 30 years ago. Pt states she ambulates with a cane and ambulation has become more difficult. Denies having oxygen at home.  Cardiologist-Dr. Mayer    Pt with SOB. Concern for fluid overload. Rule out pneumonia. Wounds do not look not acutely infected.     labs, cxr, ekg, trial lasix, duplex for le, reassess

## 2024-12-04 NOTE — ED PROVIDER NOTE - PHYSICAL EXAMINATION
Constitutional: NAD, alert, verbal  HEENT: NCAT, EOMi, PERRL  Cardiac: RRR no MRG  Resp: clear, no wheezing or crackles  GI: ab soft ntnd, no r/g  MSK/Ext: no edema  Neuro: CAPPS  Skin: No rashes Constitutional: NAD, alert, verbal  Cardiac: RRR no MRG  Resp: decreased breath sounds at bases  GI: ab soft ntnd, no r/g  MSK/Ext: b/l lower extremity edema

## 2024-12-04 NOTE — ED ADULT NURSE REASSESSMENT NOTE - NS ED NURSE REASSESS COMMENT FT1
Dressings changed for wounds on L calf and R heel. Dinner ordered for pt. Plan is to be admitted. Pt verbalized understanding. VS as charted, pt verbalized understanding.

## 2024-12-04 NOTE — H&P ADULT - HISTORY OF PRESENT ILLNESS
HPI:  74-year-old F, with PMH DM taken off metformin 1 month ago (after decreased HbA1C) ,  TAVR 8/2024 on Plavix followed by a two month rehab stay, A-fib on Xarelto, CHF, HLD, PVD, lower extremity elephantiasis  with varicose veins, who presented to [place] for SOB and lower extremity swelling that has been present for few weeks worsening this past week. Pt states she has orthopnea and decreased ambulation due to shortness of breath. PT denies any chest pain, fevers, chills, nightsweats. In the ED VSS. Propbp elevated trop negative ekg sinus ankush. CXR showed b/l pleural effusions admitted for CHF exacerbation.     PAST MEDICAL & SURGICAL HISTORY:  Diabetes mellitus type II, controlled      Atrial fibrillation      Congestive heart failure      Dyspnea      Morbid obesity with BMI of 40.0-44.9, adult      Neuropathy      Peripheral venous insufficiency      Thyroid nodule      HTN (hypertension)      Cellulitis      At risk for sleep apnea      History of esophagogastroduodenoscopy (EGD)      H/O colonoscopy      Other complications of gastric band procedure      S/P left knee arthroscopy      Status post medial meniscus repair      History of cholecystectomy      S/P cataract surgery        FAMILY HISTORY:  Family history of breast cancer in mother    Family history of diabetes mellitus in mother    FHx: heart disease (Sibling)      Social History:      Allergies    latex (Unknown)  [This allergen will not trigger allergy alert] IV DYE, IODINE CONTRAST (Unknown)  PC Pen VK (Rash)  penicillin (Hives; Urticaria)    Intolerances        MEDICATIONS  (STANDING):  aMIOdarone    Tablet 200 milliGRAM(s) Oral daily  atorvastatin 10 milliGRAM(s) Oral at bedtime  cholecalciferol 1000 Unit(s) Oral daily  ezetimibe 10 milliGRAM(s) Oral daily  gabapentin 800 milliGRAM(s) Oral three times a day  lactobacillus acidophilus 1 Tablet(s) Oral daily  metoprolol succinate ER 25 milliGRAM(s) Oral daily  montelukast 10 milliGRAM(s) Oral at bedtime    MEDICATIONS  (PRN):  acetaminophen     Tablet .. 650 milliGRAM(s) Oral every 6 hours PRN Temp greater or equal to 38C (100.4F), Mild Pain (1 - 3)  aluminum hydroxide/magnesium hydroxide/simethicone Suspension 30 milliLiter(s) Oral every 4 hours PRN Dyspepsia  melatonin 3 milliGRAM(s) Oral at bedtime PRN Insomnia  ondansetron Injectable 4 milliGRAM(s) IV Push every 8 hours PRN Nausea and/or Vomiting  oxyCODONE    IR 5 milliGRAM(s) Oral at bedtime PRN Severe Pain (7 - 10)  zolpidem 5 milliGRAM(s) Oral at bedtime PRN Insomnia      ROS:  10 point ROS negative except for ones mentioned in HPI    PE   HEENT:  Head is normocephalic    Skin:  Warm and dry without any rash   NECK:  Supple without lymphadenopathy.   HEART:  Regular rate and rhythm. normal S1 and S2, No M/R/G  LUNGS:  dec breath sounds   ABDOMEN:  Soft, nontender, nondistended with good bowel sounds heard  EXTREMITIES:  b/l pitting edema,   NEUROLOGICAL:  Gross nonfocal      PEx  T(C): 36.1 (12-04-24 @ 17:29), Max: 36.6 (12-04-24 @ 14:14)  HR: 53 (12-04-24 @ 17:29) (53 - 57)  BP: 117/51 (12-04-24 @ 17:29) (116/48 - 117/51)  RR: 16 (12-04-24 @ 17:29) (16 - 16)  SpO2: 100% (12-04-24 @ 17:29) (97% - 100%)  Wt(kg): --                        7.5    7.91  )-----------( 335      ( 04 Dec 2024 15:04 )             24.9     12-04    134[L]  |  105  |  17  ----------------------------<  98  4.6   |  26  |  0.84    Ca    8.9      04 Dec 2024 15:04  Mg     1.8     12-04    TPro  7.1  /  Alb  2.9[L]  /  TBili  0.4  /  DBili  x   /  AST  9[L]  /  ALT  8[L]  /  AlkPhos  83  12-04    CAPILLARY BLOOD GLUCOSE        PT/INR - ( 04 Dec 2024 15:04 )   PT: 39.8 sec;   INR: 3.41 ratio         PTT - ( 04 Dec 2024 15:04 )  PTT:48.8 sec  Urinalysis Basic - ( 04 Dec 2024 15:04 )    Color: x / Appearance: x / SG: x / pH: x  Gluc: 98 mg/dL / Ketone: x  / Bili: x / Urobili: x   Blood: x / Protein: x / Nitrite: x   Leuk Esterase: x / RBC: x / WBC x   Sq Epi: x / Non Sq Epi: x / Bacteria: x                Radiology/Imaging, I have personally reviewed:      IMPRESSION:    No femoropopliteal DVT. Limited evaluation of the calf veins due to   edema. Consider repeat evaluation in 7-10 days if clinical concern   persists.  IMPRESSION: Cardiomegaly with pulmonary venous congestion perhaps   slightly increased on the current exam and is without evidence of pleural   effusion.

## 2024-12-04 NOTE — ED PROVIDER NOTE - PROGRESS NOTE DETAILS
74-year-old female with past medical history A-fib on Xarelto, CHF on 40 mg furosemide, HTN, HLD, PVD, T2DM sent to the ED by Dr. Stovall from wound care for bilateral lower extremity edema that is worsening, concerned about fluid overload.  Patient endorses shortness of breath.  Denies fevers, chills, chest pain, N/V/D.  Patient neck cigarette smoker.  No use of O2 use at home.  Oxygen 99% on room air, heart rate 57, rest of vital stable.  PE demonstrates decreased breath sounds bilateral lung bases, chronic wounds bilateral lower extremities, no sign infection, bilateral lower extremity pitting edema.  Rest of exam unremarkable.  Plan for labs, chest x-ray, rule out pneumonia, CHF exacerbation, reassess. - Adilia Sparks PA-C Labs and imaging reviewed.  BNP 1797, viral swab negative.  Venous Dopplers no femoral-popliteal DVTs, unable to evaluate rest of lower extremity secondary to edema.  Chest x-ray shows pulmonary congestion.  Discussed results with patient.  Reassessed, no improvement in symptoms was given another dose of Lasix.  Will admit to medicine for worsening bilateral lower extremity edema. - Adilia Sparks PA-C

## 2024-12-04 NOTE — PATIENT PROFILE ADULT - FALL HARM RISK - HARM RISK INTERVENTIONS

## 2024-12-04 NOTE — H&P ADULT - ASSESSMENT
74-year-old F, with PMH DM taken off metformin 1 month ago (after decreased HbA1C) ,  TAVR 8/2024 on Plavix followed by a two month rehab stay, A-fib on Xarelto, CHF, HLD, PVD, lower extremity elephantiasis  with varicose veins, who presented to [place] for SOB and lower extremity swelling that has been present for few weeks worsening this past week. Pt states she has orthopnea and decreased ambulation due to shortness of breath. PT denies any chest pain, fevers, chills, nightsweats. In the ED VSS. Propbp elevated trop negative ekg sinus ankush. CXR showed b/l pleural effusions admitted for CHF exacerbation.       Assessment/Plan:    # CHF exacerbation     - daily weights, I&O  - TTE  - Lasix 40 IV daily   - Cardiology consulted    # Normocytic anemia     - anemia panel     # Mild hypervolemic hyponatremia should improve with lasix     #    Code status: Full  Diet: Heart healthy  DVT ppx  Activity: Bedrest  Disposition: Admission for    74-year-old F, with PMH DM taken off metformin 1 month ago (after decreased HbA1C) ,  TAVR 8/2024 on Plavix followed by a two month rehab stay, A-fib on Xarelto, CHF, HLD, PVD, lower extremity elephantiasis  with varicose veins, who presented to [place] for SOB and lower extremity swelling that has been present for few weeks worsening this past week. Pt states she has orthopnea and decreased ambulation due to shortness of breath. PT denies any chest pain, fevers, chills, nightsweats. In the ED VSS. Propbp elevated trop negative ekg sinus ankush. CXR showed b/l pleural effusions admitted for CHF exacerbation.       Assessment/Plan:    # CHF exacerbation EF 50-55%    - daily weights, I&O  - TTE  - Lasix 40 IV daily   - Cardiology consulted    # Normocytic anemia     - anemia panel     # Mild hypervolemic hyponatremia should improve with lasix     # Afib on xeralto  - tele  - cw toprol, did hold Cardizem due to soft BP     #lower extremity elephantiasis  with varicose veins,   - wound care     #PMH DM taken off metformin 1 month ago (after decreased HbA1C) ,  TAVR 8/2024 on Plavix CHF, HLD, PVD, lower extremity elephantiasis  with varicose veins,   - cw home meds cautions with soft bp holding spironolactone     Code status: Full  Diet: Heart healthy  DVT ppx xeralto   Activity: as tolerated  Disposition: Admission for chf

## 2024-12-04 NOTE — PATIENT PROFILE ADULT - LIVING ENVIRONMENT
[address] [place] [address]    February 14, 2018    Linwood Chi  [address] [facility] [id number]          Dear Linwood,    All of your lab tests are looking very good. Continue your healthy lifestyle. See me again in 6 months    Enclosed is a copy of your results.     Results for orders placed or performed in visit on 02/12/18   HEMOGLOBIN A1C   Result Value Ref Range    Hemoglobin A1C 6.3 (H) 4.3 - 6.0 %   Lipid panel reflex to direct LDL Fasting   Result Value Ref Range    Cholesterol 133 <200 mg/dL    Triglycerides 132 <150 mg/dL    HDL Cholesterol 48 >39 mg/dL    LDL Cholesterol Calculated 59 <100 mg/dL    Non HDL Cholesterol 85 <130 mg/dL   Comprehensive metabolic panel   Result Value Ref Range    Sodium 136 133 - 144 mmol/L    Potassium 4.9 3.4 - 5.3 mmol/L    Chloride 103 94 - 109 mmol/L    Carbon Dioxide 25 20 - 32 mmol/L    Anion Gap 8 3 - 14 mmol/L    Glucose 97 70 - 99 mg/dL    Urea Nitrogen 18 7 - 30 mg/dL    Creatinine 1.10 0.66 - 1.25 mg/dL    GFR Estimate 63 >60 mL/min/1.7m2    GFR Estimate If Black 76 >60 mL/min/1.7m2    Calcium 9.1 8.5 - 10.1 mg/dL    Bilirubin Total 0.6 0.2 - 1.3 mg/dL    Albumin 3.8 3.4 - 5.0 g/dL    Protein Total 7.5 6.8 - 8.8 g/dL    Alkaline Phosphatase 100 40 - 150 U/L    ALT 30 0 - 70 U/L    AST 16 0 - 45 U/L       If you have any questions or concerns, please call myself or my nurse at [phone].      Sincerely,        Bernie Amaro MD/sienna   no

## 2024-12-05 ENCOUNTER — RESULT REVIEW (OUTPATIENT)
Age: 74
End: 2024-12-05

## 2024-12-05 LAB
ANION GAP SERPL CALC-SCNC: 5 MMOL/L — SIGNIFICANT CHANGE UP (ref 5–17)
BUN SERPL-MCNC: 18 MG/DL — SIGNIFICANT CHANGE UP (ref 7–23)
CALCIUM SERPL-MCNC: 8.8 MG/DL — SIGNIFICANT CHANGE UP (ref 8.5–10.1)
CHLORIDE SERPL-SCNC: 102 MMOL/L — SIGNIFICANT CHANGE UP (ref 96–108)
CO2 SERPL-SCNC: 26 MMOL/L — SIGNIFICANT CHANGE UP (ref 22–31)
CREAT SERPL-MCNC: 0.9 MG/DL — SIGNIFICANT CHANGE UP (ref 0.5–1.3)
EGFR: 67 ML/MIN/1.73M2 — SIGNIFICANT CHANGE UP
FERRITIN SERPL-MCNC: 32 NG/ML — SIGNIFICANT CHANGE UP (ref 13–330)
FOLATE SERPL-MCNC: 5.6 NG/ML — SIGNIFICANT CHANGE UP
GLUCOSE SERPL-MCNC: 96 MG/DL — SIGNIFICANT CHANGE UP (ref 70–99)
HCT VFR BLD CALC: 23.6 % — LOW (ref 34.5–45)
HGB BLD-MCNC: 7.2 G/DL — LOW (ref 11.5–15.5)
IRON SATN MFR SERPL: 18 UG/DL — LOW (ref 30–160)
IRON SATN MFR SERPL: 5 % — LOW (ref 14–50)
MAGNESIUM SERPL-MCNC: 1.7 MG/DL — SIGNIFICANT CHANGE UP (ref 1.6–2.6)
MCHC RBC-ENTMCNC: 26.7 PG — LOW (ref 27–34)
MCHC RBC-ENTMCNC: 30.5 G/DL — LOW (ref 32–36)
MCV RBC AUTO: 87.4 FL — SIGNIFICANT CHANGE UP (ref 80–100)
PLATELET # BLD AUTO: 334 K/UL — SIGNIFICANT CHANGE UP (ref 150–400)
POTASSIUM SERPL-MCNC: 3.8 MMOL/L — SIGNIFICANT CHANGE UP (ref 3.5–5.3)
POTASSIUM SERPL-SCNC: 3.8 MMOL/L — SIGNIFICANT CHANGE UP (ref 3.5–5.3)
RBC # BLD: 2.7 M/UL — LOW (ref 3.8–5.2)
RBC # FLD: 17.3 % — HIGH (ref 10.3–14.5)
SODIUM SERPL-SCNC: 133 MMOL/L — LOW (ref 135–145)
TIBC SERPL-MCNC: 341 UG/DL — SIGNIFICANT CHANGE UP (ref 220–430)
UIBC SERPL-MCNC: 323 UG/DL — SIGNIFICANT CHANGE UP (ref 110–370)
VIT B12 SERPL-MCNC: 195 PG/ML — LOW (ref 232–1245)
WBC # BLD: 7.22 K/UL — SIGNIFICANT CHANGE UP (ref 3.8–10.5)
WBC # FLD AUTO: 7.22 K/UL — SIGNIFICANT CHANGE UP (ref 3.8–10.5)

## 2024-12-05 PROCEDURE — 99233 SBSQ HOSP IP/OBS HIGH 50: CPT

## 2024-12-05 PROCEDURE — 93306 TTE W/DOPPLER COMPLETE: CPT | Mod: 26

## 2024-12-05 PROCEDURE — 73700 CT LOWER EXTREMITY W/O DYE: CPT | Mod: 26,50

## 2024-12-05 PROCEDURE — 99232 SBSQ HOSP IP/OBS MODERATE 35: CPT

## 2024-12-05 RX ORDER — FUROSEMIDE 40 MG/1
40 TABLET ORAL ONCE
Refills: 0 | Status: DISCONTINUED | OUTPATIENT
Start: 2024-12-05 | End: 2024-12-06

## 2024-12-05 RX ORDER — OXYCODONE AND ACETAMINOPHEN 5; 325 MG/1; MG/1
1 TABLET ORAL EVERY 6 HOURS
Refills: 0 | Status: DISCONTINUED | OUTPATIENT
Start: 2024-12-05 | End: 2024-12-05

## 2024-12-05 RX ADMIN — ACETAMINOPHEN 500MG 650 MILLIGRAM(S): 500 TABLET, COATED ORAL at 02:26

## 2024-12-05 RX ADMIN — Medication 1 TABLET(S): at 14:35

## 2024-12-05 RX ADMIN — OXYCODONE HYDROCHLORIDE 5 MILLIGRAM(S): 30 TABLET ORAL at 02:26

## 2024-12-05 RX ADMIN — Medication 10 MILLIGRAM(S): at 21:40

## 2024-12-05 RX ADMIN — Medication 1000 UNIT(S): at 14:35

## 2024-12-05 RX ADMIN — AMIODARONE HYDROCHLORIDE 200 MILLIGRAM(S): 200 TABLET ORAL at 05:55

## 2024-12-05 RX ADMIN — CLOPIDOGREL 75 MILLIGRAM(S): 75 TABLET, FILM COATED ORAL at 14:35

## 2024-12-05 RX ADMIN — FUROSEMIDE 40 MILLIGRAM(S): 40 TABLET ORAL at 10:22

## 2024-12-05 RX ADMIN — MONTELUKAST SODIUM 10 MILLIGRAM(S): 10 TABLET ORAL at 21:40

## 2024-12-05 RX ADMIN — GABAPENTIN 800 MILLIGRAM(S): 300 CAPSULE ORAL at 21:40

## 2024-12-05 RX ADMIN — GABAPENTIN 800 MILLIGRAM(S): 300 CAPSULE ORAL at 05:51

## 2024-12-05 RX ADMIN — ZOLPIDEM TARTRATE 5 MILLIGRAM(S): 10 TABLET ORAL at 23:19

## 2024-12-05 RX ADMIN — Medication 10 MILLIGRAM(S): at 14:35

## 2024-12-05 RX ADMIN — GABAPENTIN 800 MILLIGRAM(S): 300 CAPSULE ORAL at 15:09

## 2024-12-05 RX ADMIN — RIVAROXABAN 20 MILLIGRAM(S): 10 TABLET, FILM COATED ORAL at 16:48

## 2024-12-05 RX ADMIN — Medication 2 MILLIGRAM(S): at 00:47

## 2024-12-05 NOTE — CONSULT NOTE ADULT - ASSESSMENT
A: 75 y/o Female seen for the following:   -Rt heel partial thickness pressure wound, stable without acute SOI  -Lt posterior lower leg partial thickness venous stasis wound, stable without acute SOI  -BLE venous stasis    P:   Chart reviewed and Patient evaluated  CT scan reviewed: showing diffuse circumferential skin thickening, subcutaneous edema, and stranding which may be seen with cellulitis or edema within fluid overload. No organized collection or tract gas. No evidence of osteomyelitis.  Wound to Rt heel is partial thickness, fibrogranular wound bed, no active discharge, no crepitus, no erythema, no malodor, no PTB, stable without gross SOI.   Wound to the posterior Left lower mid leg, with some serous drainage, no malodor, superficial in nature, stable without SOI.   WC: betadine with dry sterile dressing to Rt heel. Xeroform and Compressive dressings applied to LLE  No acute podiatric intervention warranted at this time. Continue with local wound care.   Pt to follow up at [facility] with Dr. Stovall for continued wound care treatment.   WBAT to BLE  Offload bilateral heels with two pillows under bilateral calfs or total CAIR boots to prevent further soft tissue damage as pt is has been predominately bedbound or sitting on a chair while placing pressure to heels.    All additional care per Med appreciated  Patient demonstrated verbal understanding of all interventions and tolerated interventions well without any complications.       Case D/W with attending Dr. Stovall

## 2024-12-05 NOTE — DIETITIAN INITIAL EVALUATION ADULT - PERTINENT LABORATORY DATA
12-05    133[L]  |  102  |  18  ----------------------------<  96  3.8   |  26  |  0.90    Ca    8.8      05 Dec 2024 07:09  Mg     1.7     12-05    TPro  7.1  /  Alb  2.9[L]  /  TBili  0.4  /  DBili  x   /  AST  9[L]  /  ALT  8[L]  /  AlkPhos  83  12-04  A1C with Estimated Average Glucose Result: 5.8 % (04-30-24 @ 05:44)

## 2024-12-05 NOTE — PHYSICAL THERAPY INITIAL EVALUATION ADULT - LIVES WITH, PROFILE
Pt lives w/ her  in a home w/ 2 SHARON and a chair lift in the home./spouse Pt lives w/ her  in a home w/ 2 SHARON + HR and a chair lift in the home./spouse

## 2024-12-05 NOTE — PROGRESS NOTE ADULT - ASSESSMENT
74-year-old F, with PMH DM taken off metformin 1 month ago (after decreased HbA1C) ,  TAVR 8/2024 on Plavix followed by a two month rehab stay, A-fib on Xarelto, CHF, HLD, PVD, lower extremity elephantiasis  with varicose veins, who presented to [place] for SOB and lower extremity swelling that has been present for few weeks worsening this past week. Pt states she has orthopnea and decreased ambulation due to shortness of breath. PT denies any chest pain, fevers, chills, nightsweats. In the ED VSS. Propbp elevated trop negative ekg sinus ankush. CXR showed b/l pleural effusions admitted for CHF exacerbation.       Assessment/Plan:    # CHF exacerbation EF 50-55%  - daily weights, I&O  - TTE - pending   - Lasix 40 IV daily  - will give an additional 40mg IV tonight   - Cardiology consulted    # Normocytic anemia   - anemia panel     # Mild hypervolemic hyponatremia should improve with lasix     # Afib on xeralto  - tele  - cw toprol, did hold Cardizem due to soft BP - continue to hold today, reinstate if HR increases    #lower extremity elephantiasis  with varicose veins,   - wound care     #PMH DM taken off metformin 1 month ago (after decreased HbA1C) ,  TAVR 8/2024 on Plavix CHF, HLD, PVD, lower extremity elephantiasis  with varicose veins,   - cw home meds cautions with soft bp holding spironolactone     Code status: Full  Diet: Heart healthy  DVT ppx xeralto   Activity: as tolerated  Disposition: Admission for chf

## 2024-12-05 NOTE — DIETITIAN INITIAL EVALUATION ADULT - OTHER INFO
74-year-old F, with PMH DM taken off metformin 1 month ago (after decreased HbA1C) ,  TAVR 8/2024 on Plavix followed by a two month rehab stay, A-fib on Xarelto, CHF, HLD, PVD, lower extremity elephantiasis  with varicose veins, who presented to [place] for SOB and lower extremity swelling that has been present for few weeks worsening this past week. Pt states she has orthopnea and decreased ambulation due to shortness of breath. PT denies any chest pain, fevers, chills, nightsweats. In the ED VSS. Propbp elevated trop negative ekg sinus ankush. CXR showed b/l pleural effusions admitted for CHF exacerbation.     Admit dx is localized edema  NFPE reveals muscle wasting, fat wasting, moderate, mild and severe  Pt had been dx'ed with diabetes, was on Metformin, has been off it for one month since her A1c decreased.   HgbA1c is 5.8   4/30/2024  Suggest check new HgbA1c  Na 133L     PBNP elevated  Suggest maintain DASH diet, pt declines ONS  suggest Confirm Goals of Care regarding nutrition support. Will provide nutrition/ hydration within goals of care.   Recommendations to follow in Plan/Intervention

## 2024-12-05 NOTE — DIETITIAN INITIAL EVALUATION ADULT - ADD RECOMMEND
Maintain DASH diet  Pt declines supplements  MVI w/ minerals daily to ensure 100% RDA met   Record PO intake in EMR after each meal (flowsheet, nursing.)   Consider adding thiamine 100 mg daily 2/2 poor PO intake/ malnutrition  Monitor bowel movements, if no BM for >3 days, consider implementing bowel regimen.   suggest Confirm Goals of Care regarding nutrition support. Will provide nutrition/ hydration within goals of care.   Monitor PO intake, tolerance, labs and weight.

## 2024-12-05 NOTE — PROGRESS NOTE ADULT - SUBJECTIVE AND OBJECTIVE BOX
HOSPITALIST ATTENDING PROGRESS NOTE    Chart and meds reviewed.      Subjective: Patient seen and examined. Resting comfortably. Continues to have lower extremity swelling. Continue IV diuresis       Additional results/Imaging, I have personally reviewed:    LABS:                            7.2    7.22  )-----------( 334      ( 05 Dec 2024 07:09 )             23.6     12-05    133[L]  |  102  |  18  ----------------------------<  96  3.8   |  26  |  0.90    Ca    8.8      05 Dec 2024 07:09  Mg     1.7     12-05    TPro  7.1  /  Alb  2.9[L]  /  TBili  0.4  /  DBili  x   /  AST  9[L]  /  ALT  8[L]  /  AlkPhos  83  12-04        LIVER FUNCTIONS - ( 04 Dec 2024 15:04 )  Alb: 2.9 g/dL / Pro: 7.1 gm/dL / ALK PHOS: 83 U/L / ALT: 8 U/L / AST: 9 U/L / GGT: x           PT/INR - ( 04 Dec 2024 15:04 )   PT: 39.8 sec;   INR: 3.41 ratio         PTT - ( 04 Dec 2024 15:04 )  PTT:48.8 sec  Urinalysis Basic - ( 05 Dec 2024 07:09 )    Color: x / Appearance: x / SG: x / pH: x  Gluc: 96 mg/dL / Ketone: x  / Bili: x / Urobili: x   Blood: x / Protein: x / Nitrite: x   Leuk Esterase: x / RBC: x / WBC x   Sq Epi: x / Non Sq Epi: x / Bacteria: x              All other systems reviewed and found to be negative with the exception of what has been described above.    MEDICATIONS  (STANDING):  aMIOdarone    Tablet 200 milliGRAM(s) Oral daily  atorvastatin 10 milliGRAM(s) Oral at bedtime  cholecalciferol 1000 Unit(s) Oral daily  clopidogrel Tablet 75 milliGRAM(s) Oral daily  ezetimibe 10 milliGRAM(s) Oral daily  furosemide   Injectable 40 milliGRAM(s) IV Push daily  gabapentin 800 milliGRAM(s) Oral three times a day  influenza  Vaccine (HIGH DOSE) 0.5 milliLiter(s) IntraMuscular once  lactobacillus acidophilus 1 Tablet(s) Oral daily  metoprolol succinate ER 25 milliGRAM(s) Oral daily  montelukast 10 milliGRAM(s) Oral at bedtime  rivaroxaban 20 milliGRAM(s) Oral with dinner    MEDICATIONS  (PRN):  acetaminophen     Tablet .. 650 milliGRAM(s) Oral every 6 hours PRN Temp greater or equal to 38C (100.4F), Mild Pain (1 - 3)  aluminum hydroxide/magnesium hydroxide/simethicone Suspension 30 milliLiter(s) Oral every 4 hours PRN Dyspepsia  melatonin 3 milliGRAM(s) Oral at bedtime PRN Insomnia  ondansetron Injectable 4 milliGRAM(s) IV Push every 8 hours PRN Nausea and/or Vomiting  oxyCODONE    IR 5 milliGRAM(s) Oral at bedtime PRN Severe Pain (7 - 10)  zolpidem 5 milliGRAM(s) Oral at bedtime PRN Insomnia      VITALS:  T(F): 98.1 (12-05-24 @ 08:00), Max: 98.2 (12-04-24 @ 22:51)  HR: 58 (12-05-24 @ 08:00) (53 - 59)  BP: 123/45 (12-05-24 @ 08:00) (97/50 - 123/45)  RR: 18 (12-05-24 @ 08:00) (16 - 19)  SpO2: 98% (12-05-24 @ 08:00) (96% - 100%)  Wt(kg): --    I&O's Summary    04 Dec 2024 07:01  -  05 Dec 2024 07:00  --------------------------------------------------------  IN: 0 mL / OUT: 2800 mL / NET: -2800 mL        CAPILLARY BLOOD GLUCOSE          PHYSICAL EXAM:  HEENT:  Head is normocephalic    Skin:  Warm and dry without any rash   NECK:  Supple without lymphadenopathy.   HEART:  Regular rate and rhythm. normal S1 and S2, No M/R/G  LUNGS:  dec breath sounds   ABDOMEN:  Soft, nontender, nondistended with good bowel sounds heard  EXTREMITIES:  b/l pitting edema,   NEUROLOGICAL:  Gross nonfocal    CULTURES:      Telemetry, personally reviewed

## 2024-12-05 NOTE — DIETITIAN INITIAL EVALUATION ADULT - NAME AND PHONE
Mounika Díaz RDN, CDN, [facility]      [phone]   [email or web address]

## 2024-12-05 NOTE — DIETITIAN INITIAL EVALUATION ADULT - PERTINENT MEDS FT
MEDICATIONS  (STANDING):  aMIOdarone    Tablet 200 milliGRAM(s) Oral daily  atorvastatin 10 milliGRAM(s) Oral at bedtime  cholecalciferol 1000 Unit(s) Oral daily  clopidogrel Tablet 75 milliGRAM(s) Oral daily  ezetimibe 10 milliGRAM(s) Oral daily  furosemide   Injectable 40 milliGRAM(s) IV Push daily  gabapentin 800 milliGRAM(s) Oral three times a day  influenza  Vaccine (HIGH DOSE) 0.5 milliLiter(s) IntraMuscular once  lactobacillus acidophilus 1 Tablet(s) Oral daily  metoprolol succinate ER 25 milliGRAM(s) Oral daily  montelukast 10 milliGRAM(s) Oral at bedtime  rivaroxaban 20 milliGRAM(s) Oral with dinner    MEDICATIONS  (PRN):  acetaminophen     Tablet .. 650 milliGRAM(s) Oral every 6 hours PRN Temp greater or equal to 38C (100.4F), Mild Pain (1 - 3)  aluminum hydroxide/magnesium hydroxide/simethicone Suspension 30 milliLiter(s) Oral every 4 hours PRN Dyspepsia  melatonin 3 milliGRAM(s) Oral at bedtime PRN Insomnia  ondansetron Injectable 4 milliGRAM(s) IV Push every 8 hours PRN Nausea and/or Vomiting  oxyCODONE    IR 5 milliGRAM(s) Oral at bedtime PRN Severe Pain (7 - 10)  zolpidem 5 milliGRAM(s) Oral at bedtime PRN Insomnia

## 2024-12-05 NOTE — DIETITIAN INITIAL EVALUATION ADULT - ORAL INTAKE PTA/DIET HISTORY
Pt lives with , she shops and cooks.  She cooks fresh food, once in a while they go out.  Pt usually eats 2 meals a day,  PO intake estimated < 75% ENN > one month.

## 2024-12-06 LAB
ALBUMIN SERPL ELPH-MCNC: 2.6 G/DL — LOW (ref 3.3–5)
ALP SERPL-CCNC: 78 U/L — SIGNIFICANT CHANGE UP (ref 40–120)
ALT FLD-CCNC: <6 U/L — LOW (ref 12–78)
ANION GAP SERPL CALC-SCNC: 6 MMOL/L — SIGNIFICANT CHANGE UP (ref 5–17)
APPEARANCE UR: ABNORMAL
AST SERPL-CCNC: 6 U/L — LOW (ref 15–37)
BACTERIA # UR AUTO: ABNORMAL /HPF
BILIRUB SERPL-MCNC: 0.5 MG/DL — SIGNIFICANT CHANGE UP (ref 0.2–1.2)
BILIRUB UR-MCNC: NEGATIVE — SIGNIFICANT CHANGE UP
BUN SERPL-MCNC: 19 MG/DL — SIGNIFICANT CHANGE UP (ref 7–23)
CALCIUM SERPL-MCNC: 8.5 MG/DL — SIGNIFICANT CHANGE UP (ref 8.5–10.1)
CAST: 0 /LPF — SIGNIFICANT CHANGE UP (ref 0–4)
CHLORIDE SERPL-SCNC: 101 MMOL/L — SIGNIFICANT CHANGE UP (ref 96–108)
CO2 SERPL-SCNC: 28 MMOL/L — SIGNIFICANT CHANGE UP (ref 22–31)
COLOR SPEC: YELLOW — SIGNIFICANT CHANGE UP
CREAT SERPL-MCNC: 0.8 MG/DL — SIGNIFICANT CHANGE UP (ref 0.5–1.3)
DIFF PNL FLD: ABNORMAL
EGFR: 77 ML/MIN/1.73M2 — SIGNIFICANT CHANGE UP
GLUCOSE BLDC GLUCOMTR-MCNC: 122 MG/DL — HIGH (ref 70–99)
GLUCOSE SERPL-MCNC: 120 MG/DL — HIGH (ref 70–99)
GLUCOSE UR QL: NEGATIVE MG/DL — SIGNIFICANT CHANGE UP
HCT VFR BLD CALC: 24.4 % — LOW (ref 34.5–45)
HGB BLD-MCNC: 7.3 G/DL — LOW (ref 11.5–15.5)
KETONES UR-MCNC: NEGATIVE MG/DL — SIGNIFICANT CHANGE UP
LEUKOCYTE ESTERASE UR-ACNC: ABNORMAL
MAGNESIUM SERPL-MCNC: 1.9 MG/DL — SIGNIFICANT CHANGE UP (ref 1.6–2.6)
MCHC RBC-ENTMCNC: 26 PG — LOW (ref 27–34)
MCHC RBC-ENTMCNC: 29.9 G/DL — LOW (ref 32–36)
MCV RBC AUTO: 86.8 FL — SIGNIFICANT CHANGE UP (ref 80–100)
NITRITE UR-MCNC: POSITIVE
PH UR: 5.5 — SIGNIFICANT CHANGE UP (ref 5–8)
PLATELET # BLD AUTO: 336 K/UL — SIGNIFICANT CHANGE UP (ref 150–400)
POTASSIUM SERPL-MCNC: 3.6 MMOL/L — SIGNIFICANT CHANGE UP (ref 3.5–5.3)
POTASSIUM SERPL-SCNC: 3.6 MMOL/L — SIGNIFICANT CHANGE UP (ref 3.5–5.3)
PROT SERPL-MCNC: 6.6 GM/DL — SIGNIFICANT CHANGE UP (ref 6–8.3)
PROT UR-MCNC: NEGATIVE MG/DL — SIGNIFICANT CHANGE UP
RBC # BLD: 2.81 M/UL — LOW (ref 3.8–5.2)
RBC # FLD: 17.5 % — HIGH (ref 10.3–14.5)
RBC CASTS # UR COMP ASSIST: 2 /HPF — SIGNIFICANT CHANGE UP (ref 0–4)
SODIUM SERPL-SCNC: 135 MMOL/L — SIGNIFICANT CHANGE UP (ref 135–145)
SP GR SPEC: 1.01 — SIGNIFICANT CHANGE UP (ref 1–1.03)
SQUAMOUS # UR AUTO: 0 /HPF — SIGNIFICANT CHANGE UP (ref 0–5)
UROBILINOGEN FLD QL: 0.2 MG/DL — SIGNIFICANT CHANGE UP (ref 0.2–1)
WBC # BLD: 8.06 K/UL — SIGNIFICANT CHANGE UP (ref 3.8–10.5)
WBC # FLD AUTO: 8.06 K/UL — SIGNIFICANT CHANGE UP (ref 3.8–10.5)
WBC UR QL: 277 /HPF — HIGH (ref 0–5)

## 2024-12-06 PROCEDURE — 99232 SBSQ HOSP IP/OBS MODERATE 35: CPT

## 2024-12-06 PROCEDURE — 99233 SBSQ HOSP IP/OBS HIGH 50: CPT

## 2024-12-06 PROCEDURE — 71045 X-RAY EXAM CHEST 1 VIEW: CPT | Mod: 26

## 2024-12-06 RX ORDER — METOCLOPRAMIDE HYDROCHLORIDE 10 MG/1
5 TABLET ORAL ONCE
Refills: 0 | Status: COMPLETED | OUTPATIENT
Start: 2024-12-06 | End: 2024-12-06

## 2024-12-06 RX ORDER — CEFTRIAXONE SODIUM 1 G
1000 VIAL (EA) INJECTION EVERY 24 HOURS
Refills: 0 | Status: DISCONTINUED | OUTPATIENT
Start: 2024-12-06 | End: 2024-12-10

## 2024-12-06 RX ORDER — TRAMADOL HYDROCHLORIDE 300 MG/1
50 CAPSULE ORAL
Refills: 0 | Status: DISCONTINUED | OUTPATIENT
Start: 2024-12-06 | End: 2024-12-07

## 2024-12-06 RX ORDER — OXYCODONE HYDROCHLORIDE 30 MG/1
5 TABLET ORAL ONCE
Refills: 0 | Status: DISCONTINUED | OUTPATIENT
Start: 2024-12-06 | End: 2024-12-06

## 2024-12-06 RX ORDER — DAPAGLIFLOZIN 10 MG/1
5 TABLET, FILM COATED ORAL DAILY
Refills: 0 | Status: DISCONTINUED | OUTPATIENT
Start: 2024-12-06 | End: 2024-12-12

## 2024-12-06 RX ORDER — ACETAMINOPHEN 500MG 500 MG/1
1000 TABLET, COATED ORAL ONCE
Refills: 0 | Status: COMPLETED | OUTPATIENT
Start: 2024-12-06 | End: 2024-12-06

## 2024-12-06 RX ADMIN — OXYCODONE HYDROCHLORIDE 5 MILLIGRAM(S): 30 TABLET ORAL at 01:17

## 2024-12-06 RX ADMIN — FUROSEMIDE 40 MILLIGRAM(S): 40 TABLET ORAL at 10:09

## 2024-12-06 RX ADMIN — GABAPENTIN 800 MILLIGRAM(S): 300 CAPSULE ORAL at 14:58

## 2024-12-06 RX ADMIN — Medication 1000 MILLIGRAM(S): at 18:16

## 2024-12-06 RX ADMIN — ONDANSETRON HYDROCHLORIDE 4 MILLIGRAM(S): 4 TABLET, FILM COATED ORAL at 07:44

## 2024-12-06 RX ADMIN — RIVAROXABAN 20 MILLIGRAM(S): 10 TABLET, FILM COATED ORAL at 16:47

## 2024-12-06 RX ADMIN — Medication 10 MILLIGRAM(S): at 21:29

## 2024-12-06 RX ADMIN — METOCLOPRAMIDE HYDROCHLORIDE 5 MILLIGRAM(S): 10 TABLET ORAL at 13:49

## 2024-12-06 RX ADMIN — AMIODARONE HYDROCHLORIDE 200 MILLIGRAM(S): 200 TABLET ORAL at 05:12

## 2024-12-06 RX ADMIN — ACETAMINOPHEN 500MG 1000 MILLIGRAM(S): 500 TABLET, COATED ORAL at 16:25

## 2024-12-06 RX ADMIN — ACETAMINOPHEN 500MG 400 MILLIGRAM(S): 500 TABLET, COATED ORAL at 14:34

## 2024-12-06 RX ADMIN — GABAPENTIN 800 MILLIGRAM(S): 300 CAPSULE ORAL at 21:28

## 2024-12-06 RX ADMIN — MONTELUKAST SODIUM 10 MILLIGRAM(S): 10 TABLET ORAL at 21:28

## 2024-12-06 RX ADMIN — GABAPENTIN 800 MILLIGRAM(S): 300 CAPSULE ORAL at 05:12

## 2024-12-06 RX ADMIN — OXYCODONE HYDROCHLORIDE 5 MILLIGRAM(S): 30 TABLET ORAL at 02:17

## 2024-12-06 NOTE — PROGRESS NOTE ADULT - SUBJECTIVE AND OBJECTIVE BOX
HOSPITALIST ATTENDING PROGRESS NOTE    Chart and meds reviewed.      Subjective: Patient seen and examined. Patient with N/V this mornign, will DC oxycodone. Patient also developed fever, Obtain UA CXR and repeat labs. Will give IV tylenol. Empiric abx      Additional results/Imaging, I have personally reviewed:    LABS:                            7.3    8.06  )-----------( 336      ( 06 Dec 2024 06:28 )             24.4     12-06    135  |  101  |  19  ----------------------------<  120[H]  3.6   |  28  |  0.80    Ca    8.5      06 Dec 2024 06:28  Mg     1.9     12-06    TPro  6.6  /  Alb  2.6[L]  /  TBili  0.5  /  DBili  x   /  AST  6[L]  /  ALT  <6[L]  /  AlkPhos  78  12-06        LIVER FUNCTIONS - ( 06 Dec 2024 06:28 )  Alb: 2.6 g/dL / Pro: 6.6 gm/dL / ALK PHOS: 78 U/L / ALT: <6 U/L / AST: 6 U/L / GGT: x             Urinalysis Basic - ( 06 Dec 2024 06:28 )    Color: x / Appearance: x / SG: x / pH: x  Gluc: 120 mg/dL / Ketone: x  / Bili: x / Urobili: x   Blood: x / Protein: x / Nitrite: x   Leuk Esterase: x / RBC: x / WBC x   Sq Epi: x / Non Sq Epi: x / Bacteria: x              All other systems reviewed and found to be negative with the exception of what has been described above.    MEDICATIONS  (STANDING):  aMIOdarone    Tablet 200 milliGRAM(s) Oral daily  atorvastatin 10 milliGRAM(s) Oral at bedtime  cefTRIAXone Injectable. 1000 milliGRAM(s) IV Push every 24 hours  cholecalciferol 1000 Unit(s) Oral daily  clopidogrel Tablet 75 milliGRAM(s) Oral daily  dapagliflozin 5 milliGRAM(s) Oral daily  ezetimibe 10 milliGRAM(s) Oral daily  furosemide   Injectable 40 milliGRAM(s) IV Push once  furosemide   Injectable 40 milliGRAM(s) IV Push daily  gabapentin 800 milliGRAM(s) Oral three times a day  influenza  Vaccine (HIGH DOSE) 0.5 milliLiter(s) IntraMuscular once  lactobacillus acidophilus 1 Tablet(s) Oral daily  metoprolol succinate ER 25 milliGRAM(s) Oral daily  montelukast 10 milliGRAM(s) Oral at bedtime  rivaroxaban 20 milliGRAM(s) Oral with dinner    MEDICATIONS  (PRN):  acetaminophen     Tablet .. 650 milliGRAM(s) Oral every 6 hours PRN Temp greater or equal to 38C (100.4F), Mild Pain (1 - 3)  aluminum hydroxide/magnesium hydroxide/simethicone Suspension 30 milliLiter(s) Oral every 4 hours PRN Dyspepsia  melatonin 3 milliGRAM(s) Oral at bedtime PRN Insomnia  ondansetron Injectable 4 milliGRAM(s) IV Push every 8 hours PRN Nausea and/or Vomiting  oxycodone    5 mG/acetaminophen 325 mG 1 Tablet(s) Oral every 6 hours PRN Severe Pain (7 - 10)  zolpidem 5 milliGRAM(s) Oral at bedtime PRN Insomnia      VITALS:  T(F): 101.9 (12-06-24 @ 14:05), Max: 101.9 (12-06-24 @ 14:05)  HR: 63 (12-06-24 @ 10:05) (59 - 93)  BP: 119/50 (12-06-24 @ 10:05) (106/60 - 140/45)  RR: 18 (12-06-24 @ 08:20) (18 - 18)  SpO2: 93% (12-06-24 @ 08:20) (93% - 97%)  Wt(kg): --    I&O's Summary    06 Dec 2024 07:01  -  06 Dec 2024 15:08  --------------------------------------------------------  IN: 450 mL / OUT: 0 mL / NET: 450 mL        CAPILLARY BLOOD GLUCOSE      POCT Blood Glucose.: 122 mg/dL (06 Dec 2024 10:08)      PHYSICAL EXAM:  HEENT:  Head is normocephalic    Skin:  Warm and dry without any rash   NECK:  Supple without lymphadenopathy.   HEART:  Regular rate and rhythm. normal S1 and S2, No M/R/G  LUNGS:  dec breath sounds   ABDOMEN:  Soft, nontender, nondistended with good bowel sounds heard  EXTREMITIES:  b/l pitting edema,   NEUROLOGICAL:  Gross nonfocal      CULTURES:      Telemetry, personally reviewed HOSPITALIST ATTENDING PROGRESS NOTE    Chart and meds reviewed.      Subjective: Patient seen and examined. Patient with N/V this mornign, will DC oxycodone. Patient also developed fever, Obtain UA CXR and repeat labs. Will give IV tylenol. Empiric abx. Started on farxiga      Additional results/Imaging, I have personally reviewed:    LABS:                            7.3    8.06  )-----------( 336      ( 06 Dec 2024 06:28 )             24.4     12-06    135  |  101  |  19  ----------------------------<  120[H]  3.6   |  28  |  0.80    Ca    8.5      06 Dec 2024 06:28  Mg     1.9     12-06    TPro  6.6  /  Alb  2.6[L]  /  TBili  0.5  /  DBili  x   /  AST  6[L]  /  ALT  <6[L]  /  AlkPhos  78  12-06        LIVER FUNCTIONS - ( 06 Dec 2024 06:28 )  Alb: 2.6 g/dL / Pro: 6.6 gm/dL / ALK PHOS: 78 U/L / ALT: <6 U/L / AST: 6 U/L / GGT: x             Urinalysis Basic - ( 06 Dec 2024 06:28 )    Color: x / Appearance: x / SG: x / pH: x  Gluc: 120 mg/dL / Ketone: x  / Bili: x / Urobili: x   Blood: x / Protein: x / Nitrite: x   Leuk Esterase: x / RBC: x / WBC x   Sq Epi: x / Non Sq Epi: x / Bacteria: x              All other systems reviewed and found to be negative with the exception of what has been described above.    MEDICATIONS  (STANDING):  aMIOdarone    Tablet 200 milliGRAM(s) Oral daily  atorvastatin 10 milliGRAM(s) Oral at bedtime  cefTRIAXone Injectable. 1000 milliGRAM(s) IV Push every 24 hours  cholecalciferol 1000 Unit(s) Oral daily  clopidogrel Tablet 75 milliGRAM(s) Oral daily  dapagliflozin 5 milliGRAM(s) Oral daily  ezetimibe 10 milliGRAM(s) Oral daily  furosemide   Injectable 40 milliGRAM(s) IV Push once  furosemide   Injectable 40 milliGRAM(s) IV Push daily  gabapentin 800 milliGRAM(s) Oral three times a day  influenza  Vaccine (HIGH DOSE) 0.5 milliLiter(s) IntraMuscular once  lactobacillus acidophilus 1 Tablet(s) Oral daily  metoprolol succinate ER 25 milliGRAM(s) Oral daily  montelukast 10 milliGRAM(s) Oral at bedtime  rivaroxaban 20 milliGRAM(s) Oral with dinner    MEDICATIONS  (PRN):  acetaminophen     Tablet .. 650 milliGRAM(s) Oral every 6 hours PRN Temp greater or equal to 38C (100.4F), Mild Pain (1 - 3)  aluminum hydroxide/magnesium hydroxide/simethicone Suspension 30 milliLiter(s) Oral every 4 hours PRN Dyspepsia  melatonin 3 milliGRAM(s) Oral at bedtime PRN Insomnia  ondansetron Injectable 4 milliGRAM(s) IV Push every 8 hours PRN Nausea and/or Vomiting  oxycodone    5 mG/acetaminophen 325 mG 1 Tablet(s) Oral every 6 hours PRN Severe Pain (7 - 10)  zolpidem 5 milliGRAM(s) Oral at bedtime PRN Insomnia      VITALS:  T(F): 101.9 (12-06-24 @ 14:05), Max: 101.9 (12-06-24 @ 14:05)  HR: 63 (12-06-24 @ 10:05) (59 - 93)  BP: 119/50 (12-06-24 @ 10:05) (106/60 - 140/45)  RR: 18 (12-06-24 @ 08:20) (18 - 18)  SpO2: 93% (12-06-24 @ 08:20) (93% - 97%)  Wt(kg): --    I&O's Summary    06 Dec 2024 07:01  -  06 Dec 2024 15:08  --------------------------------------------------------  IN: 450 mL / OUT: 0 mL / NET: 450 mL        CAPILLARY BLOOD GLUCOSE      POCT Blood Glucose.: 122 mg/dL (06 Dec 2024 10:08)      PHYSICAL EXAM:  HEENT:  Head is normocephalic    Skin:  Warm and dry without any rash   NECK:  Supple without lymphadenopathy.   HEART:  Regular rate and rhythm. normal S1 and S2, No M/R/G  LUNGS:  dec breath sounds   ABDOMEN:  Soft, nontender, nondistended with good bowel sounds heard  EXTREMITIES:  b/l pitting edema,   NEUROLOGICAL:  Gross nonfocal      CULTURES:      Telemetry, personally reviewed

## 2024-12-06 NOTE — PROGRESS NOTE ADULT - SUBJECTIVE AND OBJECTIVE BOX
12/6/24: Pt seen by podiatry team, resting comfortably. Pt nauseous/vomiting, on NC.     PMH: Diabetes mellitus type II, controlled    Atrial fibrillation    Congestive heart failure    Dyspnea    Morbid obesity with BMI of 40.0-44.9, adult    Neuropathy    Peripheral venous insufficiency    Thyroid nodule    HTN (hypertension)    Cellulitis    At risk for sleep apnea      PSH:History of esophagogastroduodenoscopy (EGD)    H/O colonoscopy    Other complications of gastric band procedure    S/P left knee arthroscopy    Status post medial meniscus repair    History of cholecystectomy    S/P cataract surgery        Allergies:latex (Unknown)  [This allergen will not trigger allergy alert] IV DYE, IODINE CONTRAST (Unknown)  PC Pen VK (Rash)  penicillin (Hives; Urticaria)      Labs:                                     7.3    8.06  )-----------( 336      ( 06 Dec 2024 06:28 )             24.4     12-06    135  |  101  |  19  ----------------------------<  120[H]  3.6   |  28  |  0.80    Ca    8.5      06 Dec 2024 06:28  Mg     1.9     12-06    TPro  6.6  /  Alb  2.6[L]  /  TBili  0.5  /  DBili  x   /  AST  6[L]  /  ALT  <6[L]  /  AlkPhos  78  12-06      Vital Signs Last 24 Hrs  T(C): 37.2 (06 Dec 2024 08:20), Max: 37.2 (06 Dec 2024 08:20)  T(F): 99 (06 Dec 2024 08:20), Max: 99 (06 Dec 2024 08:20)  HR: 63 (06 Dec 2024 10:05) (59 - 93)  BP: 119/50 (06 Dec 2024 10:05) (106/60 - 140/45)  BP(mean): --  RR: 18 (06 Dec 2024 08:20) (18 - 18)  SpO2: 93% (06 Dec 2024 08:20) (93% - 97%)    Parameters below as of 06 Dec 2024 08:20  Patient On (Oxygen Delivery Method): nasal cannula  O2 Flow (L/min): 2      REVIEW OF SYSTEMS:    CONSTITUTIONAL: No weakness, fevers or chills  EYES: No visual changes  RESPIRATORY: No cough, wheezing; No shortness of breath  CARDIOVASCULAR: No chest pain or palpitations  GASTROINTESTINAL: No abdominal or epigastric pain. No nausea, vomiting; No diarrhea or constipation.   GENITOURINARY: No dysuria, frequency or hematuria  NEUROLOGICAL: No numbness or weakness  SKIN: See physical examination.  All other review of systems is negative unless indicated above    Physical Exam:   Constitutional: NAD, alert;  Lower Extremity Focus  Derm:  Skin warm, dry and supple bilateral.   Ulceration Right heel wound, partial thickness in nature, wound base granular wound size (approx 1.5 cm X 0.5cm X 0.1cm) and surrounding predominant hyperkeratotic tissue, no stephen-wound erythema/edema, no pus/purulence, no crepitus/fluctuance, no tracking/tunneling, no probe to bone.  Partial thickness ulceration noted to posterior medial Left lower midleg, some serous drainage, superficial wound bed, no malodor,  no crepitus/fluctuance, no tracking/tunneling, no PTB.   Vascular: Dorsalis Pedis and Posterior Tibial pulses weakly palpable  Neuro: Protective sensation intact to the level of the digits bilateral.  MSK: Muscle strength 5/5 all major muscle groups bilateral. Pt is minimally ambulatory       < from: CT Lower Extremity No Cont, Bilateral (12.05.24 @ 12:06) >    ACC: [id number] EXAM:  CT LWR EXT BI   ORDERED BY: LIBIA ALVARES     PROCEDURE DATE:  12/05/2024          INTERPRETATION:  CT OF THE bilateral lower extremities WITHOUT CONTRAST.    CLINICAL INFORMATION: Pain  TECHNIQUE: Axial CT images were obtained of the bilateral lower   extremities with coronal and sagittal reconstructions. No contrast was   administered. Three dimensional reconstructions were obtained.    COMPARISON: CT of the abdomen and pelvis performed 11/18/2024.    FINDINGS:    Bones: There is no acute fracture or dislocation. 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 The pubic symphysis is preserved. There is   moderate bilateral hip osteoarthrosis. Severe tricompartmental knee   osteoarthrosis with bone-on-bone apposition atthe medial compartments   bilaterally. Ring and arc type lesion within the distal tibia felt to be   consistent with benign chondroid lesion.    Soft tissues: Fat-containing umbilical hernia is present. Colonic   diverticula without evidence of diverticulitis. Atherosclerotic vascular   calcifications are present. Diffuse subcutaneous edema and stranding   throughout bilateral lower extremities with circumferential skin   thickening. There is no organized collection. There is no tracking gas.    IMPRESSION:    CT of bilateral lower extremities demonstrates diffuse circumferential   skin thickening, subcutaneous edema, and stranding which may be seen with   cellulitis or edema within fluid overload. No organized collection or   tract gas. No evidence of osteomyelitis.        --- End of Report ---    < end of copied text >

## 2024-12-06 NOTE — CONSULT NOTE ADULT - ASSESSMENT
74 year old woman with the above PMH admitted with acute on chronic HFpEF.  No evidence for ACS.  Patient staying in sinus rhythm post ablation.  I agree with IV furosemide therapy.  I will add SGLT2 therapy.  Hopefully discharge soon.

## 2024-12-06 NOTE — DISCHARGE NOTE NURSING/CASE MANAGEMENT/SOCIAL WORK - FINANCIAL ASSISTANCE
[facility] provides services at a reduced cost to those who are determined to be eligible through [facility]’s financial assistance program. Information regarding [facility]’s financial assistance program can be found by going to [email or web address] or by calling [phone].

## 2024-12-06 NOTE — DISCHARGE NOTE NURSING/CASE MANAGEMENT/SOCIAL WORK - PATIENT PORTAL LINK FT
You can access the FollowMyHealth Patient Portal offered by [facility] by registering at the following website: [email or web address]. By joining [facility]’s FollowMyHealth portal, you will also be able to view your health information using other applications (apps) compatible with our system.

## 2024-12-06 NOTE — PROGRESS NOTE ADULT - ASSESSMENT
# CHF exacerbation EF 50-55%  - daily weights, I&O  - TTE - pending   - Lasix 40 IV daily  - will give an additional 40mg IV tonight   - Cardiology consulted    Fever  Obtain UA/UC  Blood Cultures  Empiric IV Rocephin  Obtain CXR     # Mild hypervolemic hyponatremia should improve with lasix   improved     # Afib on xeralto  - tele  - cw toprol, did hold Cardizem due to soft BP - continue to hold today, reinstate if HR increases    #lower extremity elephantiasis  with varicose veins,   - wound care     #PMH DM taken off metformin 1 month ago (after decreased HbA1C) ,  TAVR 8/2024 on Plavix CHF, HLD, PVD, lower extremity elephantiasis  with varicose veins,   - cw home meds cautions with soft bp holding spironolactone     Code status: Full  Diet: Heart healthy  DVT ppx xeralto   Activity: as tolerated  Disposition: Admission for chf        # CHF exacerbation EF 50-55%  - daily weights, I&O  - TTE - 1. Left ventricular cavity is moderately dilated. Left ventricular wall thickness is normal. Left ventricular systolic function is normal with an ejection fraction of 56 % by Ramirez's method of disks.   2. There is severe (grade 3) left ventricular diastolic dysfunction.  - Lasix 40 IV daily    - Cardiology consulted    Fever  Obtain UA/UC  Blood Cultures  Empiric IV Rocephin  Obtain CXR     # Mild hypervolemic hyponatremia should improve with lasix   improved     # Afib on xeralto  - tele  - cw toprol, did hold Cardizem due to soft BP - continue to hold today, reinstate if HR increases    #lower extremity elephantiasis  with varicose veins,   - wound care     #PMH DM taken off metformin 1 month ago (after decreased HbA1C) ,  TAVR 8/2024 on Plavix CHF, HLD, PVD, lower extremity elephantiasis  with varicose veins,   - cw home meds cautions with soft bp holding spironolactone     Code status: Full  Diet: Heart healthy  DVT ppx xeralto   Activity: as tolerated  Disposition: Admission for chf        # CHF exacerbation EF 50-55%  - daily weights, I&O  - TTE - 1. Left ventricular cavity is moderately dilated. Left ventricular wall thickness is normal. Left ventricular systolic function is normal with an ejection fraction of 56 % by Ramirez's method of disks.   2. There is severe (grade 3) left ventricular diastolic dysfunction.  - Lasix 40 IV daily    - Cardiology consulted    Fever  Obtain UA/UC   Blood Cultures  Empiric IV Rocephin  Obtain CXR     # Mild hypervolemic hyponatremia should improve with lasix   improved     # Afib on xeralto  - tele  - cw toprol, did hold Cardizem due to soft BP - continue to hold today, reinstate if HR increases    #lower extremity elephantiasis  with varicose veins,   - wound care     #PMH DM taken off metformin 1 month ago (after decreased HbA1C) ,  TAVR 8/2024 on Plavix CHF, HLD, PVD, lower extremity elephantiasis  with varicose veins,   - cw home meds cautions with soft bp holding spironolactone     Code status: Full  Diet: Heart healthy  DVT ppx xeralto   Activity: as tolerated  Disposition: Admission for chf        # CHF exacerbation EF 50-55%  - daily weights, I&O  - TTE - 1. Left ventricular cavity is moderately dilated. Left ventricular wall thickness is normal. Left ventricular systolic function is normal with an ejection fraction of 56 % by Ramirez's method of disks.   2. There is severe (grade 3) left ventricular diastolic dysfunction.  - Lasix 40 IV daily    - Cardiology consulted recommendations appreciated   Started on farxiga    Fever  Obtain UA/UC   Blood Cultures  Empiric IV Rocephin  Obtain CXR     # Mild hypervolemic hyponatremia should improve with lasix   improved     # Afib on xeralto  - tele  - cw toprol, did hold Cardizem due to soft BP - continue to hold today, reinstate if HR increases    #lower extremity elephantiasis  with varicose veins,   - wound care     #PMH DM taken off metformin 1 month ago (after decreased HbA1C) ,  TAVR 8/2024 on Plavix CHF, HLD, PVD, lower extremity elephantiasis  with varicose veins,   - cw home meds cautions with soft bp holding spironolactone     Code status: Full  Diet: Heart healthy  DVT ppx xeralto   Activity: as tolerated  Disposition: Admission for chf

## 2024-12-06 NOTE — PROGRESS NOTE ADULT - ASSESSMENT
A: 73 y/o Female seen for the following:   -Rt heel partial thickness pressure wound, stable without acute SOI  -Lt posterior lower leg partial thickness venous stasis wound, stable without acute SOI  -BLE venous stasis    P:   Chart reviewed and Patient evaluated  CT scan reviewed: showing diffuse circumferential skin thickening, subcutaneous edema, and stranding which may be seen with cellulitis or edema within fluid overload. No organized collection or tract gas. No evidence of osteomyelitis.  Wound to Rt heel is partial thickness, granular and surrounding predominant hyperkeratotic tissue, improving size dimensions since last admission, no active discharge, no crepitus, no erythema, no malodor, no PTB, stable without gross SOI.   Wound to the posterior Left lower mid leg, with some serous drainage, no malodor, superficial in nature, stable without SOI.   WC: betadine with dry sterile dressing to Rt heel. Xeroform and Compressive dressings applied to LLE  No acute podiatric intervention warranted at this time. Continue with local wound care.   Pt to follow up at [facility] with Dr. Stovall for continued wound care treatment.   WBAT to BLE  Offload bilateral heels with two pillows under bilateral calfs or total CAIR boots to prevent further soft tissue damage as pt is has been predominately bedbound or sitting on a chair while placing pressure to heels.    All additional care per Med appreciated  Patient demonstrated verbal understanding of all interventions and tolerated interventions well without any complications.       Case D/W with attending Dr. Stovall

## 2024-12-07 LAB
ALBUMIN SERPL ELPH-MCNC: 2.5 G/DL — LOW (ref 3.3–5)
ALP SERPL-CCNC: 71 U/L — SIGNIFICANT CHANGE UP (ref 40–120)
ALT FLD-CCNC: <6 U/L — LOW (ref 12–78)
ANION GAP SERPL CALC-SCNC: 4 MMOL/L — LOW (ref 5–17)
AST SERPL-CCNC: 8 U/L — LOW (ref 15–37)
BILIRUB SERPL-MCNC: 0.4 MG/DL — SIGNIFICANT CHANGE UP (ref 0.2–1.2)
BUN SERPL-MCNC: 17 MG/DL — SIGNIFICANT CHANGE UP (ref 7–23)
CALCIUM SERPL-MCNC: 8.6 MG/DL — SIGNIFICANT CHANGE UP (ref 8.5–10.1)
CHLORIDE SERPL-SCNC: 101 MMOL/L — SIGNIFICANT CHANGE UP (ref 96–108)
CO2 SERPL-SCNC: 29 MMOL/L — SIGNIFICANT CHANGE UP (ref 22–31)
CREAT SERPL-MCNC: 0.82 MG/DL — SIGNIFICANT CHANGE UP (ref 0.5–1.3)
EGFR: 75 ML/MIN/1.73M2 — SIGNIFICANT CHANGE UP
GLUCOSE SERPL-MCNC: 109 MG/DL — HIGH (ref 70–99)
HCT VFR BLD CALC: 26 % — LOW (ref 34.5–45)
HGB BLD-MCNC: 7.7 G/DL — LOW (ref 11.5–15.5)
MAGNESIUM SERPL-MCNC: 1.7 MG/DL — SIGNIFICANT CHANGE UP (ref 1.6–2.6)
MCHC RBC-ENTMCNC: 26.1 PG — LOW (ref 27–34)
MCHC RBC-ENTMCNC: 29.6 G/DL — LOW (ref 32–36)
MCV RBC AUTO: 88.1 FL — SIGNIFICANT CHANGE UP (ref 80–100)
PLATELET # BLD AUTO: 299 K/UL — SIGNIFICANT CHANGE UP (ref 150–400)
POTASSIUM SERPL-MCNC: 3.7 MMOL/L — SIGNIFICANT CHANGE UP (ref 3.5–5.3)
POTASSIUM SERPL-SCNC: 3.7 MMOL/L — SIGNIFICANT CHANGE UP (ref 3.5–5.3)
PROT SERPL-MCNC: 6.5 GM/DL — SIGNIFICANT CHANGE UP (ref 6–8.3)
RBC # BLD: 2.95 M/UL — LOW (ref 3.8–5.2)
RBC # FLD: 17.2 % — HIGH (ref 10.3–14.5)
SODIUM SERPL-SCNC: 134 MMOL/L — LOW (ref 135–145)
WBC # BLD: 5.09 K/UL — SIGNIFICANT CHANGE UP (ref 3.8–10.5)
WBC # FLD AUTO: 5.09 K/UL — SIGNIFICANT CHANGE UP (ref 3.8–10.5)

## 2024-12-07 PROCEDURE — 99233 SBSQ HOSP IP/OBS HIGH 50: CPT

## 2024-12-07 RX ORDER — HYDROMORPHONE HYDROCHLORIDE 2 MG/1
0.5 TABLET ORAL ONCE
Refills: 0 | Status: DISCONTINUED | OUTPATIENT
Start: 2024-12-07 | End: 2024-12-07

## 2024-12-07 RX ORDER — DILTIAZEM HYDROCHLORIDE 240 MG/1
120 CAPSULE, COATED, EXTENDED RELEASE ORAL DAILY
Refills: 0 | Status: DISCONTINUED | OUTPATIENT
Start: 2024-12-07 | End: 2024-12-19

## 2024-12-07 RX ADMIN — ONDANSETRON HYDROCHLORIDE 4 MILLIGRAM(S): 4 TABLET, FILM COATED ORAL at 21:30

## 2024-12-07 RX ADMIN — TRAMADOL HYDROCHLORIDE 50 MILLIGRAM(S): 300 CAPSULE ORAL at 01:25

## 2024-12-07 RX ADMIN — Medication 1000 MILLIGRAM(S): at 17:19

## 2024-12-07 RX ADMIN — ACETAMINOPHEN 500MG 650 MILLIGRAM(S): 500 TABLET, COATED ORAL at 01:24

## 2024-12-07 RX ADMIN — HYDROMORPHONE HYDROCHLORIDE 0.5 MILLIGRAM(S): 2 TABLET ORAL at 17:01

## 2024-12-07 RX ADMIN — GABAPENTIN 800 MILLIGRAM(S): 300 CAPSULE ORAL at 05:22

## 2024-12-07 RX ADMIN — AMIODARONE HYDROCHLORIDE 200 MILLIGRAM(S): 200 TABLET ORAL at 05:21

## 2024-12-07 RX ADMIN — TRAMADOL HYDROCHLORIDE 50 MILLIGRAM(S): 300 CAPSULE ORAL at 09:27

## 2024-12-07 RX ADMIN — FUROSEMIDE 40 MILLIGRAM(S): 40 TABLET ORAL at 09:32

## 2024-12-07 RX ADMIN — GABAPENTIN 800 MILLIGRAM(S): 300 CAPSULE ORAL at 21:29

## 2024-12-07 RX ADMIN — ZOLPIDEM TARTRATE 5 MILLIGRAM(S): 10 TABLET ORAL at 23:08

## 2024-12-07 RX ADMIN — HYDROMORPHONE HYDROCHLORIDE 0.5 MILLIGRAM(S): 2 TABLET ORAL at 17:46

## 2024-12-07 RX ADMIN — TRAMADOL HYDROCHLORIDE 50 MILLIGRAM(S): 300 CAPSULE ORAL at 08:27

## 2024-12-07 RX ADMIN — RIVAROXABAN 20 MILLIGRAM(S): 10 TABLET, FILM COATED ORAL at 16:45

## 2024-12-07 RX ADMIN — Medication 1 TABLET(S): at 11:59

## 2024-12-07 RX ADMIN — Medication 1000 UNIT(S): at 11:59

## 2024-12-07 RX ADMIN — MONTELUKAST SODIUM 10 MILLIGRAM(S): 10 TABLET ORAL at 21:30

## 2024-12-07 RX ADMIN — CLOPIDOGREL 75 MILLIGRAM(S): 75 TABLET, FILM COATED ORAL at 11:59

## 2024-12-07 RX ADMIN — DAPAGLIFLOZIN 5 MILLIGRAM(S): 10 TABLET, FILM COATED ORAL at 09:32

## 2024-12-07 RX ADMIN — Medication 10 MILLIGRAM(S): at 21:29

## 2024-12-07 RX ADMIN — Medication 10 MILLIGRAM(S): at 11:59

## 2024-12-07 RX ADMIN — DILTIAZEM HYDROCHLORIDE 120 MILLIGRAM(S): 240 CAPSULE, COATED, EXTENDED RELEASE ORAL at 14:55

## 2024-12-07 RX ADMIN — GABAPENTIN 800 MILLIGRAM(S): 300 CAPSULE ORAL at 13:12

## 2024-12-07 NOTE — PROGRESS NOTE ADULT - SUBJECTIVE AND OBJECTIVE BOX
HOSPITALIST ATTENDING PROGRESS NOTE    Chart and meds reviewed.      Subjective: Patient seen and examined. Resting comfortably/ N/V has improved. Likely form opiod, will attempt to limit but patient ok with restarting if pain gets worse. UA suspicious for UTI. Started on IV rocephin, follow up cultures. CXR without consolidation .      Additional results/Imaging, I have personally reviewed:    LABS:                            7.7    5.09  )-----------( 299      ( 07 Dec 2024 06:54 )             26.0     12-07    134[L]  |  101  |  17  ----------------------------<  109[H]  3.7   |  29  |  0.82    Ca    8.6      07 Dec 2024 06:54  Mg     1.7     12-07    TPro  6.5  /  Alb  2.5[L]  /  TBili  0.4  /  DBili  x   /  AST  8[L]  /  ALT  <6[L]  /  AlkPhos  71  12-07        LIVER FUNCTIONS - ( 07 Dec 2024 06:54 )  Alb: 2.5 g/dL / Pro: 6.5 gm/dL / ALK PHOS: 71 U/L / ALT: <6 U/L / AST: 8 U/L / GGT: x             Urinalysis Basic - ( 07 Dec 2024 06:54 )    Color: x / Appearance: x / SG: x / pH: x  Gluc: 109 mg/dL / Ketone: x  / Bili: x / Urobili: x   Blood: x / Protein: x / Nitrite: x   Leuk Esterase: x / RBC: x / WBC x   Sq Epi: x / Non Sq Epi: x / Bacteria: x              All other systems reviewed and found to be negative with the exception of what has been described above.    MEDICATIONS  (STANDING):  aMIOdarone    Tablet 200 milliGRAM(s) Oral daily  atorvastatin 10 milliGRAM(s) Oral at bedtime  cefTRIAXone Injectable. 1000 milliGRAM(s) IV Push every 24 hours  cholecalciferol 1000 Unit(s) Oral daily  clopidogrel Tablet 75 milliGRAM(s) Oral daily  dapagliflozin 5 milliGRAM(s) Oral daily  ezetimibe 10 milliGRAM(s) Oral daily  furosemide   Injectable 40 milliGRAM(s) IV Push daily  gabapentin 800 milliGRAM(s) Oral three times a day  influenza  Vaccine (HIGH DOSE) 0.5 milliLiter(s) IntraMuscular once  lactobacillus acidophilus 1 Tablet(s) Oral daily  metoprolol succinate ER 25 milliGRAM(s) Oral daily  montelukast 10 milliGRAM(s) Oral at bedtime  rivaroxaban 20 milliGRAM(s) Oral with dinner    MEDICATIONS  (PRN):  acetaminophen     Tablet .. 650 milliGRAM(s) Oral every 6 hours PRN Temp greater or equal to 38C (100.4F), Mild Pain (1 - 3)  aluminum hydroxide/magnesium hydroxide/simethicone Suspension 30 milliLiter(s) Oral every 4 hours PRN Dyspepsia  melatonin 3 milliGRAM(s) Oral at bedtime PRN Insomnia  ondansetron Injectable 4 milliGRAM(s) IV Push every 8 hours PRN Nausea and/or Vomiting  traMADol 50 milliGRAM(s) Oral two times a day PRN Severe Pain (7 - 10)  zolpidem 5 milliGRAM(s) Oral at bedtime PRN Insomnia      VITALS:  T(F): 98.1 (12-07-24 @ 09:00), Max: 100.2 (12-07-24 @ 01:21)  HR: 69 (12-07-24 @ 09:00) (60 - 74)  BP: 116/42 (12-07-24 @ 09:00) (99/41 - 116/42)  RR: 18 (12-07-24 @ 09:00) (17 - 18)  SpO2: 98% (12-07-24 @ 09:00) (94% - 100%)  Wt(kg): --    I&O's Summary    06 Dec 2024 07:01  -  07 Dec 2024 07:00  --------------------------------------------------------  IN: 750 mL / OUT: 1900 mL / NET: -1150 mL    07 Dec 2024 07:01  -  07 Dec 2024 14:13  --------------------------------------------------------  IN: 650 mL / OUT: 600 mL / NET: 50 mL        CAPILLARY BLOOD GLUCOSE          PHYSICAL EXAM:  Gen: No acute distress   HEENT:  Head is normocephalic    Skin:  Warm and dry without any rash   NECK:  Supple without lymphadenopathy.   HEART:  Regular rate and rhythm. normal S1 and S2, No M/R/G  LUNGS:  dec breath sounds   ABDOMEN:  Soft, nontender, nondistended with good bowel sounds heard  EXTREMITIES:  b/l pitting edema,   NEUROLOGICAL:  Gross nonfocal      CULTURES:  Blood, Urine: Non Hemolyzed Trace (12-06 @ 17:14)      Telemetry, personally reviewed

## 2024-12-07 NOTE — PROGRESS NOTE ADULT - ASSESSMENT
# CHF exacerbation EF 50-55%  - daily weights, I&O  - TTE - 1. Left ventricular cavity is moderately dilated. Left ventricular wall thickness is normal. Left ventricular systolic function is normal with an ejection fraction of 56 % by Ramirez's method of disks.   2. There is severe (grade 3) left ventricular diastolic dysfunction.  - Lasix 40 IV daily    - Cardiology consulted recommendations appreciated   Started on farxiga    Fever/UTI  No fevers overnight   US suspicious for UTI  Follow up Blood and urine Cultures  Continue  IV Rocephin      # Mild hypervolemic hyponatremia should improve with lasix   improved     # Afib on xeralto  - tele  - cw toprol,   resume home cardizem     #lower extremity elephantiasis  with varicose veins,   - wound care     #PMH DM taken off metformin 1 month ago (after decreased HbA1C) ,  TAVR 8/2024 on Plavix CHF, HLD, PVD, lower extremity elephantiasis  with varicose veins,   - cw home meds cautions with soft bp holding spironolactone     Code status: Full  Diet: Heart healthy  DVT ppx xeralto   Activity: as tolerated  Disposition: Admission for chf

## 2024-12-08 LAB
ANION GAP SERPL CALC-SCNC: 3 MMOL/L — LOW (ref 5–17)
BUN SERPL-MCNC: 19 MG/DL — SIGNIFICANT CHANGE UP (ref 7–23)
CALCIUM SERPL-MCNC: 8.8 MG/DL — SIGNIFICANT CHANGE UP (ref 8.5–10.1)
CHLORIDE SERPL-SCNC: 99 MMOL/L — SIGNIFICANT CHANGE UP (ref 96–108)
CO2 SERPL-SCNC: 31 MMOL/L — SIGNIFICANT CHANGE UP (ref 22–31)
CREAT SERPL-MCNC: 0.79 MG/DL — SIGNIFICANT CHANGE UP (ref 0.5–1.3)
EGFR: 78 ML/MIN/1.73M2 — SIGNIFICANT CHANGE UP
GLUCOSE SERPL-MCNC: 89 MG/DL — SIGNIFICANT CHANGE UP (ref 70–99)
HCT VFR BLD CALC: 24.9 % — LOW (ref 34.5–45)
HGB BLD-MCNC: 7.2 G/DL — LOW (ref 11.5–15.5)
MAGNESIUM SERPL-MCNC: 1.8 MG/DL — SIGNIFICANT CHANGE UP (ref 1.6–2.6)
MCHC RBC-ENTMCNC: 25.7 PG — LOW (ref 27–34)
MCHC RBC-ENTMCNC: 28.9 G/DL — LOW (ref 32–36)
MCV RBC AUTO: 88.9 FL — SIGNIFICANT CHANGE UP (ref 80–100)
PLATELET # BLD AUTO: 279 K/UL — SIGNIFICANT CHANGE UP (ref 150–400)
POTASSIUM SERPL-MCNC: 3.4 MMOL/L — LOW (ref 3.5–5.3)
POTASSIUM SERPL-SCNC: 3.4 MMOL/L — LOW (ref 3.5–5.3)
RBC # BLD: 2.8 M/UL — LOW (ref 3.8–5.2)
RBC # FLD: 16.6 % — HIGH (ref 10.3–14.5)
SODIUM SERPL-SCNC: 133 MMOL/L — LOW (ref 135–145)
WBC # BLD: 4.13 K/UL — SIGNIFICANT CHANGE UP (ref 3.8–10.5)
WBC # FLD AUTO: 4.13 K/UL — SIGNIFICANT CHANGE UP (ref 3.8–10.5)

## 2024-12-08 PROCEDURE — 93970 EXTREMITY STUDY: CPT | Mod: 26

## 2024-12-08 PROCEDURE — 93925 LOWER EXTREMITY STUDY: CPT | Mod: 26

## 2024-12-08 PROCEDURE — 99233 SBSQ HOSP IP/OBS HIGH 50: CPT

## 2024-12-08 RX ORDER — SPIRONOLACTONE 25 MG
25 TABLET ORAL DAILY
Refills: 0 | Status: DISCONTINUED | OUTPATIENT
Start: 2024-12-08 | End: 2024-12-19

## 2024-12-08 RX ORDER — CYCLOBENZAPRINE HCL 10 MG
10 TABLET ORAL THREE TIMES A DAY
Refills: 0 | Status: DISCONTINUED | OUTPATIENT
Start: 2024-12-08 | End: 2024-12-19

## 2024-12-08 RX ORDER — HYDROMORPHONE HYDROCHLORIDE 2 MG/1
0.5 TABLET ORAL ONCE
Refills: 0 | Status: DISCONTINUED | OUTPATIENT
Start: 2024-12-08 | End: 2024-12-19

## 2024-12-08 RX ORDER — METOCLOPRAMIDE HYDROCHLORIDE 10 MG/1
5 TABLET ORAL ONCE
Refills: 0 | Status: COMPLETED | OUTPATIENT
Start: 2024-12-08 | End: 2024-12-08

## 2024-12-08 RX ORDER — POTASSIUM CHLORIDE 600 MG/1
20 TABLET, EXTENDED RELEASE ORAL
Refills: 0 | Status: COMPLETED | OUTPATIENT
Start: 2024-12-08 | End: 2024-12-08

## 2024-12-08 RX ADMIN — DAPAGLIFLOZIN 5 MILLIGRAM(S): 10 TABLET, FILM COATED ORAL at 09:14

## 2024-12-08 RX ADMIN — GABAPENTIN 800 MILLIGRAM(S): 300 CAPSULE ORAL at 13:07

## 2024-12-08 RX ADMIN — RIVAROXABAN 20 MILLIGRAM(S): 10 TABLET, FILM COATED ORAL at 17:24

## 2024-12-08 RX ADMIN — Medication 1 TABLET(S): at 11:14

## 2024-12-08 RX ADMIN — MONTELUKAST SODIUM 10 MILLIGRAM(S): 10 TABLET ORAL at 22:10

## 2024-12-08 RX ADMIN — Medication 10 MILLIGRAM(S): at 14:19

## 2024-12-08 RX ADMIN — Medication 1000 UNIT(S): at 11:14

## 2024-12-08 RX ADMIN — POTASSIUM CHLORIDE 20 MILLIEQUIVALENT(S): 600 TABLET, EXTENDED RELEASE ORAL at 11:14

## 2024-12-08 RX ADMIN — METOCLOPRAMIDE HYDROCHLORIDE 5 MILLIGRAM(S): 10 TABLET ORAL at 12:04

## 2024-12-08 RX ADMIN — POTASSIUM CHLORIDE 20 MILLIEQUIVALENT(S): 600 TABLET, EXTENDED RELEASE ORAL at 09:14

## 2024-12-08 RX ADMIN — Medication 1000 MILLIGRAM(S): at 17:24

## 2024-12-08 RX ADMIN — DILTIAZEM HYDROCHLORIDE 120 MILLIGRAM(S): 240 CAPSULE, COATED, EXTENDED RELEASE ORAL at 09:14

## 2024-12-08 RX ADMIN — CLOPIDOGREL 75 MILLIGRAM(S): 75 TABLET, FILM COATED ORAL at 11:14

## 2024-12-08 RX ADMIN — GABAPENTIN 800 MILLIGRAM(S): 300 CAPSULE ORAL at 22:10

## 2024-12-08 RX ADMIN — FUROSEMIDE 40 MILLIGRAM(S): 40 TABLET ORAL at 09:15

## 2024-12-08 RX ADMIN — Medication 10 MILLIGRAM(S): at 11:16

## 2024-12-08 RX ADMIN — GABAPENTIN 800 MILLIGRAM(S): 300 CAPSULE ORAL at 06:19

## 2024-12-08 RX ADMIN — Medication 10 MILLIGRAM(S): at 22:10

## 2024-12-08 NOTE — PROGRESS NOTE ADULT - ASSESSMENT
74-year-old F, with PMH DM taken off metformin 1 month ago (after decreased HbA1C) ,  TAVR 8/2024 on Plavix followed by a two month rehab stay, A-fib on Xarelto, CHF, HLD, PVD, lower extremity elephantiasis  with varicose veins, who presented to [place] for SOB and lower extremity swelling that has been present for few weeks worsening this past week.  CXR showed b/l pleural effusions admitted for CHF exacerbation.       # HFpEF exacerbation 50-55%  - daily weights, I&O  - TTE - 1. Left ventricular cavity is moderately dilated. Left ventricular wall thickness is normal. Left ventricular systolic function is normal with an ejection fraction of 56 % by Ramirez's method of disks.   2. There is severe (grade 3) left ventricular diastolic dysfunction.  - Continue Lasix 40 IV daily    - Cardiology consulted recommendations appreciated   Continue farxiga, restart spironolactone     Fever/UTI  No fevers overnight   Follow up Blood cultures - NGTD   urine Cultures e.coli pan sensitive   Continue IV Rocephin    Hypokalemia  Oral replacement and restart spironolactone     # Mild hypervolemic hyponatremia should improve with lasix   improved     # Afib on xeralto s/p ablation   - remains sinus rhythm post ablation  - cw toprol,   resume home cardizem     #lower extremity elephantiasis  with varicose veins,   - wound care   podiatry consulted recommendations appreciated     #PMH DM taken off metformin 1 month ago (after decreased HbA1C) ,  TAVR 8/2024 on Plavix CHF, HLD, PVD, lower extremity elephantiasis  with varicose veins,   - cw home meds cautions with soft bp  will resume  spironolactone     Code status: Full  Diet: Liberalize diet upon patient request. Continue fluid restriction   DVT ppx xeralto   Activity: as tolerated  Disposition: Admission for chf

## 2024-12-08 NOTE — PROGRESS NOTE ADULT - SUBJECTIVE AND OBJECTIVE BOX
HOSPITALIST ATTENDING PROGRESS NOTE    Chart and meds reviewed.      HPI: 12/4/24- 74-year-old F, with PMH DM taken off metformin 1 month ago (after decreased HbA1C) ,  TAVR 8/2024 on Plavix followed by a two month rehab stay, A-fib on Xarelto, CHF, HLD, PVD, lower extremity elephantiasis  with varicose veins, who presented to [place] for SOB and lower extremity swelling that has been present for few weeks worsening this past week. Pt states she has orthopnea and decreased ambulation due to shortness of breath. PT denies any chest pain, fevers, chills, nightsweats. In the ED VSS. Propbp elevated trop negative ekg sinus ankush. CXR showed b/l pleural effusions admitted for CHF exacerbation.       Subjective: Patient seen and examined. Mildly more nausea again today, Etiology, opioids vs abx.  Continue to have leg pain will repeat US Venous and obtain  Arterial dopplers of lower extremities. Denies chest pain or worsening shortness of breath. Continue IV abx for UTI.  CXR without consolidation      Additional results/Imaging, I have personally reviewed:    LABS:                            7.2    4.13  )-----------( 279      ( 08 Dec 2024 06:31 )             24.9     12-08    133[L]  |  99  |  19  ----------------------------<  89  3.4[L]   |  31  |  0.79    Ca    8.8      08 Dec 2024 06:31  Mg     1.8     12-08    TPro  6.5  /  Alb  2.5[L]  /  TBili  0.4  /  DBili  x   /  AST  8[L]  /  ALT  <6[L]  /  AlkPhos  71  12-07        LIVER FUNCTIONS - ( 07 Dec 2024 06:54 )  Alb: 2.5 g/dL / Pro: 6.5 gm/dL / ALK PHOS: 71 U/L / ALT: <6 U/L / AST: 8 U/L / GGT: x             Urinalysis Basic - ( 08 Dec 2024 06:31 )    Color: x / Appearance: x / SG: x / pH: x  Gluc: 89 mg/dL / Ketone: x  / Bili: x / Urobili: x   Blood: x / Protein: x / Nitrite: x   Leuk Esterase: x / RBC: x / WBC x   Sq Epi: x / Non Sq Epi: x / Bacteria: x              All other systems reviewed and found to be negative with the exception of what has been described above.    MEDICATIONS  (STANDING):  aMIOdarone    Tablet 200 milliGRAM(s) Oral daily  atorvastatin 10 milliGRAM(s) Oral at bedtime  cefTRIAXone Injectable. 1000 milliGRAM(s) IV Push every 24 hours  cholecalciferol 1000 Unit(s) Oral daily  clopidogrel Tablet 75 milliGRAM(s) Oral daily  dapagliflozin 5 milliGRAM(s) Oral daily  diltiazem    milliGRAM(s) Oral daily  ezetimibe 10 milliGRAM(s) Oral daily  furosemide   Injectable 40 milliGRAM(s) IV Push daily  gabapentin 800 milliGRAM(s) Oral three times a day  HYDROmorphone  Injectable 0.5 milliGRAM(s) IV Push once  influenza  Vaccine (HIGH DOSE) 0.5 milliLiter(s) IntraMuscular once  lactobacillus acidophilus 1 Tablet(s) Oral daily  metoprolol succinate ER 25 milliGRAM(s) Oral daily  montelukast 10 milliGRAM(s) Oral at bedtime  rivaroxaban 20 milliGRAM(s) Oral with dinner    MEDICATIONS  (PRN):  acetaminophen     Tablet .. 650 milliGRAM(s) Oral every 6 hours PRN Temp greater or equal to 38C (100.4F), Mild Pain (1 - 3)  aluminum hydroxide/magnesium hydroxide/simethicone Suspension 30 milliLiter(s) Oral every 4 hours PRN Dyspepsia  cyclobenzaprine 10 milliGRAM(s) Oral three times a day PRN Muscle Spasm  melatonin 3 milliGRAM(s) Oral at bedtime PRN Insomnia  ondansetron Injectable 4 milliGRAM(s) IV Push every 8 hours PRN Nausea and/or Vomiting  oxycodone    5 mG/acetaminophen 325 mG 1 Tablet(s) Oral every 6 hours PRN Severe Pain (7 - 10)  zolpidem 5 milliGRAM(s) Oral at bedtime PRN Insomnia      VITALS:  T(F): 97.9 (12-08-24 @ 08:29), Max: 99.6 (12-07-24 @ 16:07)  HR: 57 (12-08-24 @ 08:29) (55 - 65)  BP: 124/57 (12-08-24 @ 08:29) (104/36 - 124/57)  RR: 18 (12-08-24 @ 08:29) (17 - 18)  SpO2: 96% (12-08-24 @ 08:29) (96% - 97%)  Wt(kg): --    I&O's Summary    07 Dec 2024 07:01  -  08 Dec 2024 07:00  --------------------------------------------------------  IN: 1400 mL / OUT: 1950 mL / NET: -550 mL    08 Dec 2024 07:01  -  08 Dec 2024 13:12  --------------------------------------------------------  IN: 550 mL / OUT: 1150 mL / NET: -600 mL        CAPILLARY BLOOD GLUCOSE          PHYSICAL EXAM:  Gen: No acute distress   HEENT:  Head is normocephalic    Skin:  Warm and dry without any rash   NECK:  Supple without lymphadenopathy.   HEART:  Regular rate and rhythm. normal S1 and S2, No M/R/G  LUNGS:  dec breath sounds   ABDOMEN:  Soft, nontender, nondistended with good bowel sounds heard  EXTREMITIES:  b/l pitting edema,   NEUROLOGICAL:  Gross nonfocal      CULTURES:      Telemetry, personally reviewed

## 2024-12-09 LAB
ALBUMIN SERPL ELPH-MCNC: 2.6 G/DL — LOW (ref 3.3–5)
ALP SERPL-CCNC: 66 U/L — SIGNIFICANT CHANGE UP (ref 40–120)
ALT FLD-CCNC: 9 U/L — LOW (ref 12–78)
ANION GAP SERPL CALC-SCNC: 2 MMOL/L — LOW (ref 5–17)
AST SERPL-CCNC: 14 U/L — LOW (ref 15–37)
BILIRUB SERPL-MCNC: 0.2 MG/DL — SIGNIFICANT CHANGE UP (ref 0.2–1.2)
BUN SERPL-MCNC: 22 MG/DL — SIGNIFICANT CHANGE UP (ref 7–23)
CALCIUM SERPL-MCNC: 8.9 MG/DL — SIGNIFICANT CHANGE UP (ref 8.5–10.1)
CHLORIDE SERPL-SCNC: 101 MMOL/L — SIGNIFICANT CHANGE UP (ref 96–108)
CO2 SERPL-SCNC: 31 MMOL/L — SIGNIFICANT CHANGE UP (ref 22–31)
CREAT SERPL-MCNC: 0.77 MG/DL — SIGNIFICANT CHANGE UP (ref 0.5–1.3)
EGFR: 80 ML/MIN/1.73M2 — SIGNIFICANT CHANGE UP
GLUCOSE SERPL-MCNC: 99 MG/DL — SIGNIFICANT CHANGE UP (ref 70–99)
HCT VFR BLD CALC: 25.5 % — LOW (ref 34.5–45)
HGB BLD-MCNC: 7.5 G/DL — LOW (ref 11.5–15.5)
MAGNESIUM SERPL-MCNC: 2.1 MG/DL — SIGNIFICANT CHANGE UP (ref 1.6–2.6)
MCHC RBC-ENTMCNC: 25.9 PG — LOW (ref 27–34)
MCHC RBC-ENTMCNC: 29.4 G/DL — LOW (ref 32–36)
MCV RBC AUTO: 87.9 FL — SIGNIFICANT CHANGE UP (ref 80–100)
PLATELET # BLD AUTO: 357 K/UL — SIGNIFICANT CHANGE UP (ref 150–400)
POTASSIUM SERPL-MCNC: 4.1 MMOL/L — SIGNIFICANT CHANGE UP (ref 3.5–5.3)
POTASSIUM SERPL-SCNC: 4.1 MMOL/L — SIGNIFICANT CHANGE UP (ref 3.5–5.3)
PROT SERPL-MCNC: 7 GM/DL — SIGNIFICANT CHANGE UP (ref 6–8.3)
RBC # BLD: 2.9 M/UL — LOW (ref 3.8–5.2)
RBC # FLD: 16.7 % — HIGH (ref 10.3–14.5)
SODIUM SERPL-SCNC: 134 MMOL/L — LOW (ref 135–145)
WBC # BLD: 5.46 K/UL — SIGNIFICANT CHANGE UP (ref 3.8–10.5)
WBC # FLD AUTO: 5.46 K/UL — SIGNIFICANT CHANGE UP (ref 3.8–10.5)

## 2024-12-09 PROCEDURE — 99232 SBSQ HOSP IP/OBS MODERATE 35: CPT

## 2024-12-09 RX ORDER — FUROSEMIDE 40 MG/1
40 TABLET ORAL DAILY
Refills: 0 | Status: DISCONTINUED | OUTPATIENT
Start: 2024-12-10 | End: 2024-12-19

## 2024-12-09 RX ADMIN — Medication 1000 UNIT(S): at 09:22

## 2024-12-09 RX ADMIN — CLOPIDOGREL 75 MILLIGRAM(S): 75 TABLET, FILM COATED ORAL at 09:22

## 2024-12-09 RX ADMIN — MONTELUKAST SODIUM 10 MILLIGRAM(S): 10 TABLET ORAL at 21:28

## 2024-12-09 RX ADMIN — AMIODARONE HYDROCHLORIDE 200 MILLIGRAM(S): 200 TABLET ORAL at 06:02

## 2024-12-09 RX ADMIN — Medication 10 MILLIGRAM(S): at 09:22

## 2024-12-09 RX ADMIN — Medication 25 MILLIGRAM(S): at 09:22

## 2024-12-09 RX ADMIN — DAPAGLIFLOZIN 5 MILLIGRAM(S): 10 TABLET, FILM COATED ORAL at 09:22

## 2024-12-09 RX ADMIN — Medication 1 TABLET(S): at 09:22

## 2024-12-09 RX ADMIN — GABAPENTIN 800 MILLIGRAM(S): 300 CAPSULE ORAL at 06:02

## 2024-12-09 RX ADMIN — METOPROLOL TARTRATE 25 MILLIGRAM(S): 100 TABLET, FILM COATED ORAL at 06:02

## 2024-12-09 RX ADMIN — FUROSEMIDE 40 MILLIGRAM(S): 40 TABLET ORAL at 09:22

## 2024-12-09 RX ADMIN — GABAPENTIN 800 MILLIGRAM(S): 300 CAPSULE ORAL at 14:07

## 2024-12-09 RX ADMIN — ZOLPIDEM TARTRATE 5 MILLIGRAM(S): 10 TABLET ORAL at 23:36

## 2024-12-09 RX ADMIN — Medication 10 MILLIGRAM(S): at 21:28

## 2024-12-09 RX ADMIN — GABAPENTIN 800 MILLIGRAM(S): 300 CAPSULE ORAL at 21:28

## 2024-12-09 RX ADMIN — Medication 1000 MILLIGRAM(S): at 17:19

## 2024-12-09 RX ADMIN — RIVAROXABAN 20 MILLIGRAM(S): 10 TABLET, FILM COATED ORAL at 17:19

## 2024-12-09 NOTE — PROGRESS NOTE ADULT - ASSESSMENT
74-year-old F, with PMH DM taken off metformin 1 month ago (after decreased HbA1C) ,  TAVR 8/2024 on Plavix followed by a two month rehab stay, A-fib on Xarelto, CHF, HLD, PVD, lower extremity elephantiasis  with varicose veins, who presented to [place] for SOB and lower extremity swelling that has been present for few weeks worsening this past week.  CXR showed b/l pleural effusions admitted for CHF exacerbation.       # HFpEF exacerbation 50-55%  - daily weights, I&O  - TTE - 1. Left ventricular cavity is moderately dilated. Left ventricular wall thickness is normal. Left ventricular systolic function is normal with an ejection fraction of 56 % by Ramirez's method of disks.   2. There is severe (grade 3) left ventricular diastolic dysfunction.  - Continue Lasix 40 IV daily    - Cardiology consulted recommendations appreciated   Continue farxiga, restart spironolactone     Fever/UTI  No fevers overnight   Follow up Blood cultures - NGTD   urine Cultures e.coli pan sensitive   Continue IV Rocephin    Hypokalemia  Oral replacement and restart spironolactone       dispo- d  plan tmrw  # Mild hypervolemic hyponatremia should improve with lasix   improved     # Afib on xeralto s/p ablation   - remains sinus rhythm post ablation  - cw toprol,   resume home cardizem     #lower extremity elephantiasis  with varicose veins,   - wound care podiatry consulted recommendations appreciated     #PMH DM taken off metformin 1 month ago (after decreased HbA1C) ,  TAVR 8/2024 on Plavix CHF, HLD, PVD, lower extremity elephantiasis  with varicose veins,   - cw home meds cautions with soft bp  will resume  spironolactone     Code status: Full  Diet: Liberalize diet upon patient request. Continue fluid restriction   DVT ppx xeralto   Activity: as tolerated  Disposition: Admission for chf

## 2024-12-09 NOTE — PROGRESS NOTE ADULT - SUBJECTIVE AND OBJECTIVE BOX
12/9/24: Pt followed by podiatry team for day wounds, pt resting comfortably, having breakfast. NAD. Dressings clean, dry and intact to day feet.     PMH: Diabetes mellitus type II, controlled    Atrial fibrillation    Congestive heart failure    Dyspnea    Morbid obesity with BMI of 40.0-44.9, adult    Neuropathy    Peripheral venous insufficiency    Thyroid nodule    HTN (hypertension)    Cellulitis    At risk for sleep apnea      PSH:History of esophagogastroduodenoscopy (EGD)    H/O colonoscopy    Other complications of gastric band procedure    S/P left knee arthroscopy    Status post medial meniscus repair    History of cholecystectomy    S/P cataract surgery        Allergies:latex (Unknown)  [This allergen will not trigger allergy alert] IV DYE, IODINE CONTRAST (Unknown)  PC Pen VK (Rash)  penicillin (Hives; Urticaria)      Labs:                        7.5    5.46  )-----------( 357      ( 09 Dec 2024 06:54 )             25.5   12-09    134[L]  |  101  |  22  ----------------------------<  99  4.1   |  31  |  0.77    Ca    8.9      09 Dec 2024 06:54  Mg     2.1     12-09    TPro  7.0  /  Alb  2.6[L]  /  TBili  0.2  /  DBili  x   /  AST  14[L]  /  ALT  9[L]  /  AlkPhos  66  12-09    Vital Signs Last 24 Hrs  T(C): 36.6 (09 Dec 2024 09:16), Max: 37.3 (08 Dec 2024 15:30)  T(F): 97.8 (09 Dec 2024 09:16), Max: 99.1 (08 Dec 2024 15:30)  HR: 56 (09 Dec 2024 09:16) (56 - 64)  BP: 119/48 (09 Dec 2024 09:16) (116/43 - 121/53)  BP(mean): --  RR: 18 (09 Dec 2024 09:16) (16 - 18)  SpO2: 91% (09 Dec 2024 09:16) (91% - 98%)    Parameters below as of 09 Dec 2024 09:16  Patient On (Oxygen Delivery Method): room air    REVIEW OF SYSTEMS:    CONSTITUTIONAL: No weakness, fevers or chills  EYES: No visual changes  RESPIRATORY: No cough, wheezing; No shortness of breath  CARDIOVASCULAR: No chest pain or palpitations  GASTROINTESTINAL: No abdominal or epigastric pain. No nausea, vomiting; No diarrhea or constipation.   GENITOURINARY: No dysuria, frequency or hematuria  NEUROLOGICAL: No numbness or weakness  SKIN: See physical examination.  All other review of systems is negative unless indicated above    Physical Exam:   Constitutional: NAD, alert;  Lower Extremity Focus  Derm:  Skin warm, dry and supple bilateral.   Ulceration Right heel wound, partial thickness in nature, wound base granular wound size (approx 1.5 cm X 0.5cm X 0.1cm) and surrounding predominant hyperkeratotic tissue, no stephen-wound erythema/edema, no pus/purulence, no crepitus/fluctuance, no tracking/tunneling, no probe to bone.  Partial thickness ulceration noted to posterior medial Left lower midleg, some serous drainage, superficial wound bed, no malodor,  no crepitus/fluctuance, no tracking/tunneling, no PTB.   Vascular: Dorsalis Pedis and Posterior Tibial pulses weakly palpable  Neuro: Protective sensation intact to the level of the digits bilateral.  MSK: Muscle strength 5/5 all major muscle groups bilateral. Pt is minimally ambulatory       < from: CT Lower Extremity No Cont, Bilateral (12.05.24 @ 12:06) >    ACC: [id number] EXAM:  CT LWR EXT BI   ORDERED BY: LIBIA ALVARES     PROCEDURE DATE:  12/05/2024          INTERPRETATION:  CT OF THE bilateral lower extremities WITHOUT CONTRAST.    CLINICAL INFORMATION: Pain  TECHNIQUE: Axial CT images were obtained of the bilateral lower   extremities with coronal and sagittal reconstructions. No contrast was   administered. Three dimensional reconstructions were obtained.    COMPARISON: CT of the abdomen and pelvis performed 11/18/2024.    FINDINGS:    Bones: There is no acute fracture or dislocation. 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 The pubic symphysis is preserved. There is   moderate bilateral hip osteoarthrosis. Severe tricompartmental knee   osteoarthrosis with bone-on-bone apposition atthe medial compartments   bilaterally. Ring and arc type lesion within the distal tibia felt to be   consistent with benign chondroid lesion.    Soft tissues: Fat-containing umbilical hernia is present. Colonic   diverticula without evidence of diverticulitis. Atherosclerotic vascular   calcifications are present. Diffuse subcutaneous edema and stranding   throughout bilateral lower extremities with circumferential skin   thickening. There is no organized collection. There is no tracking gas.    IMPRESSION:    CT of bilateral lower extremities demonstrates diffuse circumferential   skin thickening, subcutaneous edema, and stranding which may be seen with   cellulitis or edema within fluid overload. No organized collection or   tract gas. No evidence of osteomyelitis.        --- End of Report ---    < end of copied text >

## 2024-12-09 NOTE — PROGRESS NOTE ADULT - ASSESSMENT
A: 75 y/o Female seen for the following:   -Rt heel partial thickness pressure wound, stable without acute SOI  -Lt posterior lower leg partial thickness venous stasis wound, stable without acute SOI  -BLE venous stasis    P:   Chart reviewed and Patient evaluated  CT scan reviewed: showing diffuse circumferential skin thickening, subcutaneous edema, and stranding which may be seen with cellulitis or edema within fluid overload. No organized collection or tract gas. No evidence of osteomyelitis.  Wound to Rt heel is partial thickness, granular and surrounding predominant hyperkeratotic tissue, improving size dimensions since last admission, no active discharge, no crepitus, no erythema, no malodor, no PTB, stable without gross SOI.   Wound to the posterior Left lower mid leg, with some serous drainage, no malodor, superficial in nature, stable without SOI.   WC: betadine with dry sterile dressing to Rt heel. Xeroform and Compressive dressings applied to LLE  No acute podiatric intervention warranted at this time. Continue with local wound care.   Pt to follow up at [facility] with Dr. Stovall for continued wound care treatment.   WBAT to BLE  Offload bilateral heels with two pillows under bilateral calfs or total CAIR boots to prevent further soft tissue damage as pt is has been predominately bedbound or sitting on a chair while placing pressure to heels.    All additional care per Med appreciated  Patient demonstrated verbal understanding of all interventions and tolerated interventions well without any complications.       Case D/W with attending Dr. Stovall

## 2024-12-09 NOTE — PROGRESS NOTE ADULT - SUBJECTIVE AND OBJECTIVE BOX
74-year-old F, with PMH DM taken off metformin 1 month ago (after decreased HbA1C) ,  TAVR 8/2024 on Plavix followed by a two month rehab stay, A-fib on Xarelto, CHF, HLD, PVD, lower extremity elephantiasis  with varicose veins, who presented to [place] for SOB and lower extremity swelling that has been present for few weeks worsening this past week. Pt states she has orthopnea and decreased ambulation due to shortness of breath. PT denies any chest pain, fevers, chills, nightsweats. In the ED VSS. Propbp elevated trop negative ekg sinus ankush. CXR showed b/l pleural effusions admitted for CHF exacerbation.       Subjective: Patient seen and examined. Mildly more nausea again today, Etiology, opioids vs abx.  Continue to have leg pain will repeat US Venous and obtain  Arterial dopplers of lower extremities. Denies chest pain or worsening shortness of breath. Continue IV abx for UTI.  CXR without consolidation      Additional results/Imaging, I have personally reviewed:All other systems reviewed and found to be negative with the exception of what has been described above.PHYSICAL EXAM:  Gen: No acute distress   HEENT:  Head is normocephalic    Skin:  Warm and dry without any rash   NECK:  Supple without lymphadenopathy.   HEART:  Regular rate and rhythm. normal S1 and S2, No M/R/G  LUNGS:  dec breath sounds   ABDOMEN:  Soft, nontender, nondistended with good bowel sounds heard  EXTREMITIES:  b/l pitting edema,   NEUROLOGICAL:  Gross nonfocal

## 2024-12-10 LAB
-  AMPICILLIN/SULBACTAM: SIGNIFICANT CHANGE UP
-  AMPICILLIN: SIGNIFICANT CHANGE UP
-  AZTREONAM: SIGNIFICANT CHANGE UP
-  CEFAZOLIN: SIGNIFICANT CHANGE UP
-  CEFEPIME: SIGNIFICANT CHANGE UP
-  CEFTRIAXONE: SIGNIFICANT CHANGE UP
-  CEFUROXIME: SIGNIFICANT CHANGE UP
-  CIPROFLOXACIN: SIGNIFICANT CHANGE UP
-  ERTAPENEM: SIGNIFICANT CHANGE UP
-  GENTAMICIN: SIGNIFICANT CHANGE UP
-  IMIPENEM: SIGNIFICANT CHANGE UP
-  LEVOFLOXACIN: SIGNIFICANT CHANGE UP
-  MEROPENEM: SIGNIFICANT CHANGE UP
-  NITROFURANTOIN: SIGNIFICANT CHANGE UP
-  PIPERACILLIN/TAZOBACTAM: SIGNIFICANT CHANGE UP
-  TOBRAMYCIN: SIGNIFICANT CHANGE UP
-  TRIMETHOPRIM/SULFAMETHOXAZOLE: SIGNIFICANT CHANGE UP
APTT BLD: 35.3 SEC — SIGNIFICANT CHANGE UP (ref 24.5–35.6)
CULTURE RESULTS: ABNORMAL
INR BLD: 1.39 RATIO — HIGH (ref 0.85–1.16)
MAGNESIUM SERPL-MCNC: 2.1 MG/DL — SIGNIFICANT CHANGE UP (ref 1.6–2.6)
METHOD TYPE: SIGNIFICANT CHANGE UP
ORGANISM # SPEC MICROSCOPIC CNT: ABNORMAL
ORGANISM # SPEC MICROSCOPIC CNT: SIGNIFICANT CHANGE UP
PROTHROM AB SERPL-ACNC: 16.4 SEC — HIGH (ref 9.9–13.4)
SPECIMEN SOURCE: SIGNIFICANT CHANGE UP

## 2024-12-10 PROCEDURE — 75635 CT ANGIO ABDOMINAL ARTERIES: CPT | Mod: 26

## 2024-12-10 PROCEDURE — 99222 1ST HOSP IP/OBS MODERATE 55: CPT | Mod: GC

## 2024-12-10 PROCEDURE — 99233 SBSQ HOSP IP/OBS HIGH 50: CPT

## 2024-12-10 RX ORDER — MEROPENEM 500 MG/1
1000 INJECTION, POWDER, FOR SOLUTION INTRAVENOUS EVERY 8 HOURS
Refills: 0 | Status: COMPLETED | OUTPATIENT
Start: 2024-12-10 | End: 2024-12-13

## 2024-12-10 RX ADMIN — GABAPENTIN 800 MILLIGRAM(S): 300 CAPSULE ORAL at 13:24

## 2024-12-10 RX ADMIN — DAPAGLIFLOZIN 5 MILLIGRAM(S): 10 TABLET, FILM COATED ORAL at 09:32

## 2024-12-10 RX ADMIN — MEROPENEM 1000 MILLIGRAM(S): 500 INJECTION, POWDER, FOR SOLUTION INTRAVENOUS at 21:31

## 2024-12-10 RX ADMIN — GABAPENTIN 800 MILLIGRAM(S): 300 CAPSULE ORAL at 06:31

## 2024-12-10 RX ADMIN — ZOLPIDEM TARTRATE 5 MILLIGRAM(S): 10 TABLET ORAL at 23:37

## 2024-12-10 RX ADMIN — METOPROLOL TARTRATE 25 MILLIGRAM(S): 100 TABLET, FILM COATED ORAL at 09:36

## 2024-12-10 RX ADMIN — CLOPIDOGREL 75 MILLIGRAM(S): 75 TABLET, FILM COATED ORAL at 09:32

## 2024-12-10 RX ADMIN — Medication 10 MILLIGRAM(S): at 21:30

## 2024-12-10 RX ADMIN — Medication 25 MILLIGRAM(S): at 09:31

## 2024-12-10 RX ADMIN — Medication 1000 UNIT(S): at 09:32

## 2024-12-10 RX ADMIN — Medication 10 MILLIGRAM(S): at 09:32

## 2024-12-10 RX ADMIN — DILTIAZEM HYDROCHLORIDE 120 MILLIGRAM(S): 240 CAPSULE, COATED, EXTENDED RELEASE ORAL at 09:31

## 2024-12-10 RX ADMIN — Medication 1 TABLET(S): at 09:33

## 2024-12-10 RX ADMIN — GABAPENTIN 800 MILLIGRAM(S): 300 CAPSULE ORAL at 21:30

## 2024-12-10 RX ADMIN — ACETAMINOPHEN, DIPHENHYDRAMINE HCL, PHENYLEPHRINE HCL 3 MILLIGRAM(S): 325; 25; 5 TABLET ORAL at 23:37

## 2024-12-10 RX ADMIN — AMIODARONE HYDROCHLORIDE 200 MILLIGRAM(S): 200 TABLET ORAL at 06:31

## 2024-12-10 RX ADMIN — MONTELUKAST SODIUM 10 MILLIGRAM(S): 10 TABLET ORAL at 21:30

## 2024-12-10 RX ADMIN — FUROSEMIDE 40 MILLIGRAM(S): 40 TABLET ORAL at 09:32

## 2024-12-10 RX ADMIN — MEROPENEM 1000 MILLIGRAM(S): 500 INJECTION, POWDER, FOR SOLUTION INTRAVENOUS at 13:24

## 2024-12-10 NOTE — PHARMACOTHERAPY INTERVENTION NOTE - COMMENTS
Medication reconciliation complete.  Home medications reviewed with the patient at bedside and confirmed against Dr. [facility].  Patient is prescribed to take metoprolol succinate 25 mg once daily (decreased from 50 mg twice daily) and could not remember if she has been taking it once daily or twice daily but believes she is taking it once daily.  Patient was recently prescribed doxycycline which she stated she never completed due to severe diarrhea.  Patient reports a medication allergy to penicillin (Hives).    Home Medications:  Acidophilus oral tablet: 1 tab(s) orally once a day (04 Dec 2024 16:26)  cefuroxime 500 mg oral tablet: 1 tab(s) orally every 12 hours ***COURSE COMPLETE*** (04 Dec 2024 16:22)  doxycycline hyclate 100 mg oral capsule: 1 cap(s) orally every 12 hours ***COURSE INCOMPLETE- PATIENT STATES STOPPED TAKING DUE TO DIARRHEA*** (04 Dec 2024 16:26)  furosemide 40 mg oral tablet: 1 tab(s) orally once a day as needed (04 Dec 2024 16:26)  metoprolol succinate 25 mg oral tablet, extended release: 1 tab(s) orally once a day ***PATIENT UNSURE IF SHE IS TAKING ONCE OR TWICE DAILY- BELIVES SHE TAKES ONCE DAILY*** (Prescribed to be taking once daily) (04 Dec 2024 16:24)  oxyCODONE 5 mg oral tablet: 1 tab(s) orally once a day (at bedtime) as needed for Severe Pain (7 - 10) MDD: 30 (04 Dec 2024 16:21)  Vitamin D3 25 mcg (1000 intl units) oral capsule: 1 cap(s) orally once a day (04 Dec 2024 16:26)  zolpidem 10 mg oral tablet: 1 tab(s) orally once a day (at bedtime) as needed MDD: 1 (04 Dec 2024 16:26)  
ESBL e coli in the urine - changed ceftriaxone to meropenem

## 2024-12-10 NOTE — CONSULT NOTE ADULT - SUBJECTIVE AND OBJECTIVE BOX
Date of consult: 12/5/2024    HPI: 74-year-old F, with PMH DM taken off metformin 1 month ago (after decreased HbA1C) ,  TAVR 8/2024 on Plavix followed by a two month rehab stay, A-fib on Xarelto, CHF, HLD, PVD, lower extremity elephantiasis  with varicose veins, who presented to [place] for SOB and lower extremity swelling that has been present for few weeks worsening this past week. Pt states she has orthopnea and decreased ambulation due to shortness of breath. PT denies any chest pain, fevers, chills, nightsweats. In the ED VSS. Propbp elevated trop negative ekg sinus ankush. CXR showed b/l pleural effusions admitted for CHF exacerbation. (04 Dec 2024 19:20)  Podiatry consult for Rt heel partial thickness pressure wound and BLE venous stasis. Pt has chronic Rt heel wound and left venous stasis ulcer which been taken care by local wound care and follow up at [facility] with Dr Stovall. Pt has visiting nurse service that helps her with dressing change 2x week and she gets dressing change once a week at [facility]. Pt denies any worsening of the wounds, States she has pain to ady feet which is due to neuropathy and takes medicine for it. Denies worsening of the pain. Denies any fever, change in Apetite     PMH: Diabetes mellitus type II, controlled    Atrial fibrillation    Congestive heart failure    Dyspnea    Morbid obesity with BMI of 40.0-44.9, adult    Neuropathy    Peripheral venous insufficiency    Thyroid nodule    HTN (hypertension)    Cellulitis    At risk for sleep apnea      PSH:History of esophagogastroduodenoscopy (EGD)    H/O colonoscopy    Other complications of gastric band procedure    S/P left knee arthroscopy    Status post medial meniscus repair    History of cholecystectomy    S/P cataract surgery        Allergies:latex (Unknown)  [This allergen will not trigger allergy alert] IV DYE, IODINE CONTRAST (Unknown)  PC Pen VK (Rash)  penicillin (Hives; Urticaria)      Labs:                          7.2    7.22  )-----------( 334      ( 05 Dec 2024 07:09 )             23.6     WBC Trend  7.22 Date (12-05 @ 07:09)  7.91 Date (12-04 @ 15:04)  8.12 Date (11-19 @ 09:01)  7.40 Date (11-18 @ 14:12)      Chem  12-05    133[L]  |  102  |  18  ----------------------------<  96  3.8   |  26  |  0.90    Ca    8.8      05 Dec 2024 07:09  Mg     1.7     12-05    TPro  7.1  /  Alb  2.9[L]  /  TBili  0.4  /  DBili  x   /  AST  9[L]  /  ALT  8[L]  /  AlkPhos  83  12-04          T(F): 98.1 (12-05-24 @ 16:51), Max: 98.2 (12-04-24 @ 22:51)  HR: 59 (12-05-24 @ 16:51) (53 - 59)  BP: 106/60 (12-05-24 @ 16:51) (97/50 - 123/45)  RR: 18 (12-05-24 @ 16:51) (17 - 19)  SpO2: 97% (12-05-24 @ 16:51) (96% - 100%)  Wt(kg): --    REVIEW OF SYSTEMS:    CONSTITUTIONAL: No weakness, fevers or chills  EYES: No visual changes  RESPIRATORY: No cough, wheezing; No shortness of breath  CARDIOVASCULAR: No chest pain or palpitations  GASTROINTESTINAL: No abdominal or epigastric pain. No nausea, vomiting; No diarrhea or constipation.   GENITOURINARY: No dysuria, frequency or hematuria  NEUROLOGICAL: No numbness or weakness  SKIN: See physical examination.  All other review of systems is negative unless indicated above    Physical Exam:   Constitutional: NAD, alert;  Lower Extremity Focus  Derm:  Skin warm, dry and supple bilateral.   Ulceration Right heel wound, partial thickness in nature, wound base fibrogranular, wound size (approx 2 cm X 2cm X 0.1cm), no stephen-wound erythema/edema, no pus/purulence, no crepitus/fluctuance, no tracking/tunneling, no probe to bone.  Partial thickness ulceration noted to posterior medial Left lower midleg, some serous drainage, superficial wound bed, no malodor,  no crepitus/fluctuance, no tracking/tunneling, no PTB.   Vascular: Dorsalis Pedis and Posterior Tibial pulses weakly palpable  Neuro: Protective sensation intact to the level of the digits bilateral.  MSK: Muscle strength 5/5 all major muscle groups bilateral. Pt is minimally ambulatory       < from: CT Lower Extremity No Cont, Bilateral (12.05.24 @ 12:06) >    ACC: [id number] EXAM:  CT LWR EXT BI   ORDERED BY: LIBIA ALVARES     PROCEDURE DATE:  12/05/2024          INTERPRETATION:  CT OF THE bilateral lower extremities WITHOUT CONTRAST.    CLINICAL INFORMATION: Pain  TECHNIQUE: Axial CT images were obtained of the bilateral lower   extremities with coronal and sagittal reconstructions. No contrast was   administered. Three dimensional reconstructions were obtained.    COMPARISON: CT of the abdomen and pelvis performed 11/18/2024.    FINDINGS:    Bones: There is no acute fracture or dislocation. 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 The pubic symphysis is preserved. There is   moderate bilateral hip osteoarthrosis. Severe tricompartmental knee   osteoarthrosis with bone-on-bone apposition atthe medial compartments   bilaterally. Ring and arc type lesion within the distal tibia felt to be   consistent with benign chondroid lesion.    Soft tissues: Fat-containing umbilical hernia is present. Colonic   diverticula without evidence of diverticulitis. Atherosclerotic vascular   calcifications are present. Diffuse subcutaneous edema and stranding   throughout bilateral lower extremities with circumferential skin   thickening. There is no organized collection. There is no tracking gas.    IMPRESSION:    CT of bilateral lower extremities demonstrates diffuse circumferential   skin thickening, subcutaneous edema, and stranding which may be seen with   cellulitis or edema within fluid overload. No organized collection or   tract gas. No evidence of osteomyelitis.        --- End of Report ---    < end of copied text >        
  CHIEF COMPLAINT: Patient is a 74y old  Female who presents with a chief complaint of Localized edema     (05 Dec 2024 12:41)      HPI: HPI:    HPI:  74-year-old F, with PMH DM taken off metformin 1 month ago (after decreased HbA1C) ,  TAVR 8/2024 on Plavix followed by a two month rehab stay, A-fib on Xarelto, CHF, HLD, PVD, lower extremity elephantiasis  with varicose veins, who presented to [place] for SOB and lower extremity swelling that has been present for few weeks worsening this past week. Pt states she has orthopnea and decreased ambulation due to shortness of breath. PT denies any chest pain, fevers, chills, nightsweats. In the ED VSS. Propbp elevated trop negative ekg sinus ankush. CXR showed b/l pleural effusions admitted for CHF exacerbation.     PAST MEDICAL & SURGICAL HISTORY:  Diabetes mellitus type II, controlled      Atrial fibrillation      Congestive heart failure      Dyspnea      Morbid obesity with BMI of 40.0-44.9, adult      Neuropathy      Peripheral venous insufficiency      Thyroid nodule      HTN (hypertension)      Cellulitis      At risk for sleep apnea      History of esophagogastroduodenoscopy (EGD)      H/O colonoscopy      Other complications of gastric band procedure      S/P left knee arthroscopy      Status post medial meniscus repair      History of cholecystectomy      S/P cataract surgery        FAMILY HISTORY:  Family history of breast cancer in mother    Family history of diabetes mellitus in mother    FHx: heart disease (Sibling)      Social History:      Allergies    latex (Unknown)  [This allergen will not trigger allergy alert] IV DYE, IODINE CONTRAST (Unknown)  PC Pen VK (Rash)  penicillin (Hives; Urticaria)    Intolerances        MEDICATIONS  (STANDING):  aMIOdarone    Tablet 200 milliGRAM(s) Oral daily  atorvastatin 10 milliGRAM(s) Oral at bedtime  cholecalciferol 1000 Unit(s) Oral daily  ezetimibe 10 milliGRAM(s) Oral daily  gabapentin 800 milliGRAM(s) Oral three times a day  lactobacillus acidophilus 1 Tablet(s) Oral daily  metoprolol succinate ER 25 milliGRAM(s) Oral daily  montelukast 10 milliGRAM(s) Oral at bedtime    MEDICATIONS  (PRN):  acetaminophen     Tablet .. 650 milliGRAM(s) Oral every 6 hours PRN Temp greater or equal to 38C (100.4F), Mild Pain (1 - 3)  aluminum hydroxide/magnesium hydroxide/simethicone Suspension 30 milliLiter(s) Oral every 4 hours PRN Dyspepsia  melatonin 3 milliGRAM(s) Oral at bedtime PRN Insomnia  ondansetron Injectable 4 milliGRAM(s) IV Push every 8 hours PRN Nausea and/or Vomiting  oxyCODONE    IR 5 milliGRAM(s) Oral at bedtime PRN Severe Pain (7 - 10)  zolpidem 5 milliGRAM(s) Oral at bedtime PRN Insomnia      ROS:  10 point ROS negative except for ones mentioned in HPI    PE   HEENT:  Head is normocephalic    Skin:  Warm and dry without any rash   NECK:  Supple without lymphadenopathy.   HEART:  Regular rate and rhythm. normal S1 and S2, No M/R/G  LUNGS:  dec breath sounds   ABDOMEN:  Soft, nontender, nondistended with good bowel sounds heard  EXTREMITIES:  b/l pitting edema,   NEUROLOGICAL:  Gross nonfocal      PEx  T(C): 36.1 (12-04-24 @ 17:29), Max: 36.6 (12-04-24 @ 14:14)  HR: 53 (12-04-24 @ 17:29) (53 - 57)  BP: 117/51 (12-04-24 @ 17:29) (116/48 - 117/51)  RR: 16 (12-04-24 @ 17:29) (16 - 16)  SpO2: 100% (12-04-24 @ 17:29) (97% - 100%)  Wt(kg): --                        7.5    7.91  )-----------( 335      ( 04 Dec 2024 15:04 )             24.9     12-04    134[L]  |  105  |  17  ----------------------------<  98  4.6   |  26  |  0.84    Ca    8.9      04 Dec 2024 15:04  Mg     1.8     12-04    TPro  7.1  /  Alb  2.9[L]  /  TBili  0.4  /  DBili  x   /  AST  9[L]  /  ALT  8[L]  /  AlkPhos  83  12-04    CAPILLARY BLOOD GLUCOSE        PT/INR - ( 04 Dec 2024 15:04 )   PT: 39.8 sec;   INR: 3.41 ratio         PTT - ( 04 Dec 2024 15:04 )  PTT:48.8 sec  Urinalysis Basic - ( 04 Dec 2024 15:04 )    Color: x / Appearance: x / SG: x / pH: x  Gluc: 98 mg/dL / Ketone: x  / Bili: x / Urobili: x   Blood: x / Protein: x / Nitrite: x   Leuk Esterase: x / RBC: x / WBC x   Sq Epi: x / Non Sq Epi: x / Bacteria: x                Radiology/Imaging, I have personally reviewed:      IMPRESSION:    No femoropopliteal DVT. Limited evaluation of the calf veins due to   edema. Consider repeat evaluation in 7-10 days if clinical concern   persists.  IMPRESSION: Cardiomegaly with pulmonary venous congestion perhaps   slightly increased on the current exam and is without evidence of pleural   effusion.   (04 Dec 2024 19:20)      PMHx: PAST MEDICAL & SURGICAL HISTORY:  Diabetes mellitus type II, controlled      Atrial fibrillation      Congestive heart failure      Dyspnea      Morbid obesity with BMI of 40.0-44.9, adult      Neuropathy      Peripheral venous insufficiency      Thyroid nodule      HTN (hypertension)      Cellulitis      At risk for sleep apnea      History of esophagogastroduodenoscopy (EGD)      H/O colonoscopy      Other complications of gastric band procedure      S/P left knee arthroscopy      Status post medial meniscus repair      History of cholecystectomy      S/P cataract surgery            Soc Hx:     FAMILY HISTORY:  Family history of breast cancer in mother    Family history of diabetes mellitus in mother    FHx: heart disease (Sibling)        Allergies: Allergies    latex (Unknown)  [This allergen will not trigger allergy alert] IV DYE, IODINE CONTRAST (Unknown)  PC Pen VK (Rash)  penicillin (Hives; Urticaria)    Intolerances          REVIEW OF SYSTEMS:    As above  No chest pain + shortness of breath  No lightheadeness or syncope  No leg swelling  No palpitations  No claudication-like symptoms    Vital Signs Last 24 Hrs  T(C): 37.1 (06 Dec 2024 05:00), Max: 37.1 (06 Dec 2024 05:00)  T(F): 98.8 (06 Dec 2024 05:00), Max: 98.8 (06 Dec 2024 05:00)  HR: 70 (06 Dec 2024 05:00) (59 - 70)  BP: 140/45 (06 Dec 2024 05:00) (106/60 - 140/45)  BP(mean): --  RR: 18 (06 Dec 2024 05:00) (18 - 18)  SpO2: 94% (06 Dec 2024 05:00) (94% - 97%)    Parameters below as of 06 Dec 2024 05:00  Patient On (Oxygen Delivery Method): room air        PHYSICAL EXAM:   Patient in NAD  Neck: No JVD; Carotids:  2+ without bruits  Respiratory:  Clear to A&P  Cardiovascular: S1 and S2, regular rate and rhythm, no Murmurs, gallops or rubs      MEDICATIONS:  MEDICATIONS  (STANDING):  aMIOdarone    Tablet 200 milliGRAM(s) Oral daily  atorvastatin 10 milliGRAM(s) Oral at bedtime  cholecalciferol 1000 Unit(s) Oral daily  clopidogrel Tablet 75 milliGRAM(s) Oral daily  ezetimibe 10 milliGRAM(s) Oral daily  furosemide   Injectable 40 milliGRAM(s) IV Push daily  furosemide   Injectable 40 milliGRAM(s) IV Push once  gabapentin 800 milliGRAM(s) Oral three times a day  influenza  Vaccine (HIGH DOSE) 0.5 milliLiter(s) IntraMuscular once  lactobacillus acidophilus 1 Tablet(s) Oral daily  metoprolol succinate ER 25 milliGRAM(s) Oral daily  montelukast 10 milliGRAM(s) Oral at bedtime  rivaroxaban 20 milliGRAM(s) Oral with dinner      LABS: All Labs Reviewed:  Blood Culture:   BNP Pro-Brain Natriuretic Peptide (12.04.24 @ 15:04) Pro-Brain Natriuretic Peptide: 1797 pg/mL  CBC             WBC Count: 8.06 K/uL (12-06 @ 06:28)              Hemoglobin: 7.3 g/dL (12-06 @ 06:28)  Hemoglobin: 7.2 g/dL (12-05 @ 07:09)  Hemoglobin: 7.5 g/dL (12-04 @ 15:04)              Hematocrit: 24.4 % (12-06 @ 06:28)  Hematocrit: 23.6 % (12-05 @ 07:09)  Hematocrit: 24.9 % (12-04 @ 15:04)              Mean Cell Volume: 86.8 fl (12-06 @ 06:28)  Mean Cell Volume: 87.4 fl (12-05 @ 07:09)  Mean Cell Volume: 88.6 fl (12-04 @ 15:04)              Platelet Count - Automated: 336 K/uL (12-06 @ 06:28)  Platelet Count - Automated: 334 K/uL (12-05 @ 07:09)  Platelet Count - Automated: 335 K/uL (12-04 @ 15:04)                            Cardiac markers   Troponin I, High Sensitivity (12.04.24 @ 15:04) Troponin I, High Sensitivity Result: 11.96                                     Chems        Sodium: 135 mmol/L (12-06 @ 06:28)  Sodium: 133 mmol/L (12-05 @ 07:09)  Sodium: 134 mmol/L (12-04 @ 15:04)          Potassium: 3.6 mmol/L (12-06 @ 06:28)  Potassium: 3.8 mmol/L (12-05 @ 07:09)  Potassium: 4.6 mmol/L (12-04 @ 15:04)          Blood Urea Nitrogen: 19 mg/dL (12-06 @ 06:28)  Blood Urea Nitrogen: 18 mg/dL (12-05 @ 07:09)  Blood Urea Nitrogen: 17 mg/dL (12-04 @ 15:04)          Creatinine 0.80  Creatinine 0.90  Creatinine 0.84          Magnesium: 1.9 mg/dL (12-06 @ 06:28)  Magnesium: 1.7 mg/dL (12-05 @ 07:09)  Magnesium: 1.8 mg/dL (12-04 @ 19:12)  Magnesium: 1.8 mg/dL (12-04 @ 15:04)          Protein Total: 6.6 gm/dL (12-06 @ 06:28)  Protein Total: 7.1 gm/dL (12-04 @ 15:04)                  Calcium: 8.5 mg/dL (12-06 @ 06:28)  Calcium: 8.8 mg/dL (12-05 @ 07:09)  Calcium: 8.9 mg/dL (12-04 @ 15:04)                  Bilirubin Total: 0.5 mg/dL (12-06 @ 06:28)  Bilirubin Total: 0.4 mg/dL (12-04 @ 15:04)          Alanine Aminotransferase (ALT/SGPT): <6 U/L (12-06 @ 06:28)  Alanine Aminotransferase (ALT/SGPT): 8 U/L (12-04 @ 15:04)          Aspartate Aminotransferase (AST/SGOT): 6 U/L (12-06 @ 06:28)  Aspartate Aminotransferase (AST/SGOT): 9 U/L (12-04 @ 15:04)                 INR: 3.41 ratio (12-04 @ 15:04)             RADIOLOGY:< from: CT Lower Extremity No Cont, Bilateral (12.05.24 @ 12:06) >  IMPRESSION:    CT of bilateral lower extremities demonstrates diffuse circumferential   skin thickening, subcutaneous edema, and stranding which may be seen with   cellulitis or edema within fluid overload. No organized collection or   tract gas. No evidence of osteomyelitis.    < end of copied text >  < from: Xray Chest 1 View- PORTABLE-Urgent (12.04.24 @ 14:58) >  IMPRESSION: Cardiomegaly with pulmonary venous congestion perhaps   slightly increased on the current exam and is without evidence of pleural   effusion.    < end of copied text >      EKG:  SB; IVCD; diffuse mild nondiagnostic st-t changes-unchanged compared to recent Ekg in my office    Telemetry: SB    ECHO:    
HPI:  74-year-old F, with PMH DM taken off metformin 1 month ago (after decreased HbA1C) , TAVR 2024 on Plavix followed by a two month rehab stay, A-fib on Xarelto, CHF, HLD, PVD, lower extremity elephantiasis  with varicose veins, who presented to [place] for SOB and lower extremity swelling that has been present for few weeks - worsening this past week. Pt states she has orthopnea and decreased ambulation due to shortness of breath. PT denies any chest pain, fevers, chills, night sweats. In the ED VSS. Pro-elevated trop negative ekg sinus ankush. CXR showed b/l pleural effusions admitted for CHF exacerbation. UA positive, urine cx growing ESBL Ecoli, started on IV merrem.     PAST MEDICAL & SURGICAL HISTORY:  Diabetes mellitus type II, controlled      Atrial fibrillation      Congestive heart failure      Dyspnea      Morbid obesity with BMI of 40.0-44.9, adult    Neuropathy      Peripheral venous insufficiency      Thyroid nodule      HTN (hypertension)      Cellulitis      At risk for sleep apnea      History of esophagogastroduodenoscopy (EGD)      H/O colonoscopy      Other complications of gastric band procedure      S/P left knee arthroscopy      Status post medial meniscus repair      History of cholecystectomy      S/P cataract surgery    Meds: per reconciliation sheet, noted below  MEDICATIONS  (STANDING):  aMIOdarone    Tablet 200 milliGRAM(s) Oral daily  atorvastatin 10 milliGRAM(s) Oral at bedtime  cholecalciferol 1000 Unit(s) Oral daily  clopidogrel Tablet 75 milliGRAM(s) Oral daily  dapagliflozin 5 milliGRAM(s) Oral daily  diltiazem    milliGRAM(s) Oral daily  ezetimibe 10 milliGRAM(s) Oral daily  furosemide    Tablet 40 milliGRAM(s) Oral daily  gabapentin 800 milliGRAM(s) Oral three times a day  HYDROmorphone  Injectable 0.5 milliGRAM(s) IV Push once  influenza  Vaccine (HIGH DOSE) 0.5 milliLiter(s) IntraMuscular once  lactobacillus acidophilus 1 Tablet(s) Oral daily  meropenem Injectable 1000 milliGRAM(s) IV Push every 8 hours  metoprolol succinate ER 25 milliGRAM(s) Oral daily  montelukast 10 milliGRAM(s) Oral at bedtime  spironolactone 25 milliGRAM(s) Oral daily    Allergies    latex (Unknown)  [This allergen will not trigger allergy alert] IV DYE, IODINE CONTRAST (Unknown)  PC Pen VK (Rash)  penicillin (Hives; Urticaria)    Intolerances      Social: no smoking, no alcohol, no illegal drugs; no recent travel, no exposure to TB  FAMILY HISTORY:  Family history of breast cancer in mother    Family history of diabetes mellitus in mother    FHx: heart disease (Sibling)       no history of premature cardiovascular disease in first degree relatives    ROS: +fever,  chills, no HA, no dizziness, no sore throat, no blurry vision, no CP, no palpitations, +SOB, no cough, no abdominal pain, no diarrhea, no N/V, no dysuria, no leg pain, no claudication, no rash, no joint aches, no rectal pain or bleeding, no night sweats      All other systems reviewed and are negative    Vital Signs Last 24 Hrs  T(C): 36.6 (10 Dec 2024 08:28), Max: 37 (09 Dec 2024 16:10)  T(F): 97.9 (10 Dec 2024 08:28), Max: 98.6 (09 Dec 2024 16:10)  HR: 62 (10 Dec 2024 08:28) (61 - 62)  BP: 125/51 (10 Dec 2024 08:28) (125/51 - 141/51)  BP(mean): --  RR: 18 (10 Dec 2024 08:28) (18 - 18)  SpO2: 96% (10 Dec 2024 08:28) (94% - 96%)    Parameters below as of 10 Dec 2024 08:28  Patient On (Oxygen Delivery Method): room air      Daily     Daily Weight in k.4 (10 Dec 2024 06:42)    PE:  Constitutional: NAD  HEENT: NC/AT, EOMI, PERRLA, conjunctivae clear; ears and nose atraumatic; pharynx benign  Neck: supple; thyroid not palpable  Back: no tenderness  Respiratory: respiratory effort normal; clear to auscultation  Cardiovascular: S1S2 regular, no murmurs  Abdomen: soft, not tender, not distended, positive BS; liver and spleen WNL  Genitourinary: no suprapubic tenderness  Lymphatic: no LN palpable  Musculoskeletal: no muscle tenderness, no joint swelling or tenderness  Extremities: pedal edema  Neurological/ Psychiatric: AxOx3, Judgement and insight normal;  moving all extremities  Skin: no rashes; no palpable lesions    Labs: all available labs reviewed                        7.5    5.46  )-----------( 357      ( 09 Dec 2024 06:54 )             25.5         134[L]  |  101  |  22  ----------------------------<  99  4.1   |  31  |  0.77    Ca    8.9      09 Dec 2024 06:54  Mg     2.1     12-10    TPro  7.0  /  Alb  2.6[L]  /  TBili  0.2  /  DBili  x   /  AST  14[L]  /  ALT  9[L]  /  AlkPhos  66  12     LIVER FUNCTIONS - ( 09 Dec 2024 06:54 )  Alb: 2.6 g/dL / Pro: 7.0 gm/dL / ALK PHOS: 66 U/L / ALT: 9 U/L / AST: 14 U/L / GGT: x           Urinalysis Basic - ( 09 Dec 2024 06:54 )    Color: x / Appearance: x / SG: x / pH: x  Gluc: 99 mg/dL / Ketone: x  / Bili: x / Urobili: x   Blood: x / Protein: x / Nitrite: x   Leuk Esterase: x / RBC: x / WBC x   Sq Epi: x / Non Sq Epi: x / Bacteria: x        Radiology: all available radiological tests reviewed    ACC: [id number] EXAM:  US DPLX LWR EXT VEINS COMPL BI   ORDERED BY:   JETT LUNA     PROCEDURE DATE:  2024          INTERPRETATION:  CLINICAL INFORMATION: Bilateral leg swelling.    COMPARISON: Venous Doppler dated 2024    TECHNIQUE: Duplex sonography of the BILATERAL LOWER extremity veins with   color and spectral Doppler, with and without compression.    FINDINGS:    RIGHT:  Normal compressibility of the RIGHT common femoral, femoral and popliteal   veins.  Doppler examination shows normal spontaneous and phasic flow.  No RIGHT calf vein thrombosis is detected.    LEFT:  Normal compressibility of the LEFT common femoral, femoral and popliteal   veins.  Doppler examination shows normal spontaneous and phasic flow.  Limited visualized of the LEFT calf veins. No LEFT calf vein thrombosis   detected. Evaluation is limited by overlying bandages.    IMPRESSION:  No evidence of deep venous thrombosis in either lower extremity.    Limited visualization of the left calf veins.        Advanced directives addressed: full resuscitation
HPI:  74-year-old F, with PMH DM taken off metformin 1 month ago (after decreased HbA1C), TAVR 8/2024 on Plavix followed by a two month rehab stay, A-franki on Xarelto, CHF, HLD, PVD, lower extremity elephantiasis with varicose veins, who is admitted with SOB and lower extremity swelling that has been present for few weeks worsening this past week. Pt states she has orthopnea and decreased ambulation due to shortness of breath. PT denies any chest pain, fevers, chills, nightsweats.  Propbp elevated trop negative ekg sinus ankush. CXR showed b/l pleural effusions admitted for CHF exacerbation. Vascular consulted for management of bilateral CFA stenoses as on arterial duplex. She ambulated with a cane. Lives with her , where they share cleaning responsibilities; she cooks. Developed right heel wound while laying in bed at rehab. Denies rest pain.    ROS: 14 systems reviewed with pertinent positives and negatives as above.    PAST MEDICAL & SURGICAL HISTORY:  Diabetes mellitus type II, controlled      Atrial fibrillation      Congestive heart failure      Dyspnea      Morbid obesity with BMI of 40.0-44.9, adult      Neuropathy      Peripheral venous insufficiency      Thyroid nodule      HTN (hypertension)      Cellulitis      At risk for sleep apnea      History of esophagogastroduodenoscopy (EGD)      H/O colonoscopy      Other complications of gastric band procedure      S/P left knee arthroscopy      Status post medial meniscus repair      History of cholecystectomy      S/P cataract surgery          MEDICATIONS  (STANDING):  aMIOdarone    Tablet 200 milliGRAM(s) Oral daily  atorvastatin 10 milliGRAM(s) Oral at bedtime  cholecalciferol 1000 Unit(s) Oral daily  clopidogrel Tablet 75 milliGRAM(s) Oral daily  dapagliflozin 5 milliGRAM(s) Oral daily  diltiazem    milliGRAM(s) Oral daily  ezetimibe 10 milliGRAM(s) Oral daily  furosemide    Tablet 40 milliGRAM(s) Oral daily  gabapentin 800 milliGRAM(s) Oral three times a day  HYDROmorphone  Injectable 0.5 milliGRAM(s) IV Push once  influenza  Vaccine (HIGH DOSE) 0.5 milliLiter(s) IntraMuscular once  lactobacillus acidophilus 1 Tablet(s) Oral daily  meropenem Injectable 1000 milliGRAM(s) IV Push every 8 hours  metoprolol succinate ER 25 milliGRAM(s) Oral daily  montelukast 10 milliGRAM(s) Oral at bedtime  spironolactone 25 milliGRAM(s) Oral daily    MEDICATIONS  (PRN):  acetaminophen     Tablet .. 650 milliGRAM(s) Oral every 6 hours PRN Temp greater or equal to 38C (100.4F), Mild Pain (1 - 3)  aluminum hydroxide/magnesium hydroxide/simethicone Suspension 30 milliLiter(s) Oral every 4 hours PRN Dyspepsia  cyclobenzaprine 10 milliGRAM(s) Oral three times a day PRN Muscle Spasm  melatonin 3 milliGRAM(s) Oral at bedtime PRN Insomnia  ondansetron Injectable 4 milliGRAM(s) IV Push every 8 hours PRN Nausea and/or Vomiting  oxycodone    5 mG/acetaminophen 325 mG 1 Tablet(s) Oral every 6 hours PRN Severe Pain (7 - 10)  zolpidem 5 milliGRAM(s) Oral at bedtime PRN Insomnia      Allergies    latex (Unknown)  [This allergen will not trigger allergy alert] IV DYE, IODINE CONTRAST (Unknown)  PC Pen VK (Rash)  penicillin (Hives; Urticaria)    Intolerances        SOCIAL HISTORY: Denies    FAMILY HISTORY:  Family history of breast cancer in mother    Family history of diabetes mellitus in mother    FHx: heart disease (Sibling)            Physical Exam:  GENERAL: NAD, well developed, obese  HEAD: Atraumatic, normocephalic  EYES: EOMI, PERRLA, conjunctiva and sclera clear  ENT: moist mucous membrane  NECK: supple, No JVD, midline trachea  CHEST/LUNG: No increased WOB, symmetric excursions  Heart: RRR ppp, no peripheral edema  ABDOMEN: round, soft, nondistended, nontender. no organomegaly  EXTREMITIES: +2 left fem, 1+ right fem pulses, doppler signals of b/l DP biphasic and b/l PT monophasic (weaker on right), dressing of right heel & left calf present  NERVOUS SYSTEM: AOx4, speech clear, no neuro-deficits  MSK: full ROM, no deformities  SKIN: warm to touch, no rash or lesions        Vital Signs Last 24 Hrs  T(C): 36.6 (10 Dec 2024 08:28), Max: 37 (09 Dec 2024 16:10)  T(F): 97.9 (10 Dec 2024 08:28), Max: 98.6 (09 Dec 2024 16:10)  HR: 62 (10 Dec 2024 08:28) (61 - 62)  BP: 125/51 (10 Dec 2024 08:28) (125/51 - 141/51)  BP(mean): --  RR: 18 (10 Dec 2024 08:28) (18 - 18)  SpO2: 96% (10 Dec 2024 08:28) (94% - 96%)    Parameters below as of 10 Dec 2024 08:28  Patient On (Oxygen Delivery Method): room air        I&O's Summary    09 Dec 2024 07:01  -  10 Dec 2024 07:00  --------------------------------------------------------  IN: 0 mL / OUT: 900 mL / NET: -900 mL            LABS:                        7.5    5.46  )-----------( 357      ( 09 Dec 2024 06:54 )             25.5     12-09    134[L]  |  101  |  22  ----------------------------<  99  4.1   |  31  |  0.77    Ca    8.9      09 Dec 2024 06:54  Mg     2.1     12-10    TPro  7.0  /  Alb  2.6[L]  /  TBili  0.2  /  DBili  x   /  AST  14[L]  /  ALT  9[L]  /  AlkPhos  66  12-09      Urinalysis Basic - ( 09 Dec 2024 06:54 )    Color: x / Appearance: x / SG: x / pH: x  Gluc: 99 mg/dL / Ketone: x  / Bili: x / Urobili: x   Blood: x / Protein: x / Nitrite: x   Leuk Esterase: x / RBC: x / WBC x   Sq Epi: x / Non Sq Epi: x / Bacteria: x      CAPILLARY BLOOD GLUCOSE        LIVER FUNCTIONS - ( 09 Dec 2024 06:54 )  Alb: 2.6 g/dL / Pro: 7.0 gm/dL / ALK PHOS: 66 U/L / ALT: 9 U/L / AST: 14 U/L / GGT: x             RADIOLOGY & ADDITIONAL STUDIES:    12/8 BLE aDup  IMPRESSION:  Elevated peak systolic velocities in the right common femoral artery   suggestive of hemodynamically significant stenosis.    Abnormal waveforms throughout the lower extremities bilaterally, which   correlating with prior CT raises the possibility may be due to bilateral   common femoral artery stenoses.    Unable to visualize the left posterior tibial and bilateral peroneal   arteries which could be due to to technical limitation versus occlusion.

## 2024-12-10 NOTE — CONSULT NOTE ADULT - ASSESSMENT
74-year-old F, with PMH DM taken off metformin 1 month ago (after decreased HbA1C) , TAVR 8/2024 on Plavix followed by a two month rehab stay, A-fib on Xarelto, CHF, HLD, PVD, lower extremity elephantiasis  with varicose veins, who presented to [place] for SOB and lower extremity swelling that has been present for few weeks - worsening this past week. Pt states she has orthopnea and decreased ambulation due to shortness of breath. PT denies any chest pain, fevers, chills, night sweats. In the ED VSS. Pro-elevated trop negative ekg sinus ankush. CXR showed b/l pleural effusions admitted for CHF exacerbation. UA positive, urine cx growing ESBL Ecoli, started on IV merrem.     Fever. Pyuria. UTI with ESBL Ecoli.    - imaging reviewed  - agree with IV meropenem 1mgq8h  - 3-5 day course  - contact precautions  - monitor temps  - tolerating abx well so far; no side effects noted  - reason for abx use and side effects reviewed with patient  - supportive care  - fu cbc     IV to Po abx token not applicable to case

## 2024-12-10 NOTE — CONSULT NOTE ADULT - CONSULT REASON
CHF recent TAVR
Bilateral CFA stenosis
esbl ecoli uti
Rt heel partial thickness pressure wound  -BLE venous stasis

## 2024-12-10 NOTE — CONSULT NOTE ADULT - ASSESSMENT
Recommendations:    -Hold Xarelto; transition to short-acting AC pending risk assessment by Primary  -Obtain CTA aorta w/ BLE run off to toes  -Obtain LOWELL/PVR BLEs    Discussed with Dr. Manjarrez 74-year-old F, with PMH DM taken off metformin 1 month ago (after decreased HbA1C), TAVR 8/2024 on Plavix followed by a two month rehab stay, A-fib on Xarelto, CHF, HLD, PVD, lower extremity elephantiasis with varicose veins, who is admitted with CHF exacerbation. Vascular consulted for management of bilateral CFA stenoses as on arterial duplex. Femoral pulses asymmetric, no pedal pulses but doppler signals of DP/PT bilaterally present.    Recommendations:    -Hold Xarelto; transition to short-acting AC pending risk assessment by Primary  -Obtain CTA aorta w/ BLE run off to toes  -Obtain LOWELL/PVR BLEs    Discussed with Dr. Manjarrez

## 2024-12-10 NOTE — PROGRESS NOTE ADULT - ASSESSMENT
74-year-old F, with PMH DM taken off metformin 1 month ago (after decreased HbA1C) ,  TAVR 8/2024 on Plavix followed by a two month rehab stay, A-fib on Xarelto, CHF, HLD, PVD, lower extremity elephantiasis  with varicose veins, who presented to [place] for SOB and lower extremity swelling that has been present for few weeks worsening this past week.  CXR showed b/l pleural effusions admitted for CHF exacerbation.       # HFpEF exacerbation 50-55%  - daily weights, I&O  - TTE - 1. Left ventricular cavity is moderately dilated. Left ventricular wall thickness is normal. Left ventricular systolic function is normal with an ejection fraction of 56 % by Ramirez's method of disks.   2. There is severe (grade 3) left ventricular diastolic dysfunction.  - Continue Lasix 40 IV daily   switched to lasix po  - Cardiology consulted recommendations appreciated   Continue farxiga, restart spironolactone     Fever/UTI  esbl ecoli  started meropenem    B/l femoral arty stenosis  vascular w/u underway    Hypokalemia  Oral replacement and restart spironolactone   dispo- d  plan tmrw  # Mild hypervolemic hyponatremia should improve with lasix   improved     # Afib on xeralto s/p ablation   - remains sinus rhythm post ablation  - cw toprol,   resume home cardizem     #lower extremity elephantiasis  with varicose veins,   - wound care podiatry consulted recommendations appreciated     #PMH DM taken off metformin 1 month ago (after decreased HbA1C) ,  TAVR 8/2024 on Plavix CHF, HLD, PVD, lower extremity elephantiasis  with varicose veins,   - cw home meds cautions with soft bp  will resume  spironolactone     Code status: Full  Diet: Liberalize diet upon patient request. Continue fluid restriction   DVT ppx xeralto   Activity: as tolerated  Disposition: Admission for chf

## 2024-12-10 NOTE — PROGRESS NOTE ADULT - SUBJECTIVE AND OBJECTIVE BOX
74-year-old F, with PMH DM taken off metformin 1 month ago (after decreased HbA1C) ,  TAVR 2024 on Plavix followed by a two month rehab stay, A-fib on Xarelto, CHF, HLD, PVD, lower extremity elephantiasis  with varicose veins, who presented to [place] for SOB and lower extremity swelling that has been present for few weeks worsening this past week. Pt states she has orthopnea and decreased ambulation due to shortness of breath. PT denies any chest pain, fevers, chills, nightsweats. In the ED VSS. Propbp elevated trop negative ekg sinus ankush. CXR showed b/l pleural effusions admitted for CHF exacerbation.       Subjective: Patient seen and examined. Mildly more nausea again today, Etiology, opioids vs abx.  Continue to have leg pain will repeat US Venous and obtain  Arterial dopplers of lower extremities. Denies chest pain or worsening shortness of breath. Continue IV abx for UTI.  CXR without consolidation      Additional results/Imaging, I have personally reviewed:All other systems reviewed and found to be negative with the exception of what has been described above    .PHYSICAL EXAM:  Gen: No acute distress   HEENT:  Head is normocephalic    Skin:  Warm and dry without any rash   NECK:  Supple without lymphadenopathy.   HEART:  Regular rate and rhythm. normal S1 and S2, No M/R/G  LUNGS:  dec breath sounds   ABDOMEN:  Soft, nontender, nondistended with good bowel sounds heard  EXTREMITIES:  b/l pitting edema,   NEUROLOGICAL:  Gross nonfocal        PHYSICAL EXAM:    Daily     Daily Weight in k.4 (10 Dec 2024 06:42)    ICU Vital Signs Last 24 Hrs  T(C): 36.4 (10 Dec 2024 15:27), Max: 36.6 (10 Dec 2024 08:28)  T(F): 97.5 (10 Dec 2024 15:27), Max: 97.9 (10 Dec 2024 08:28)  HR: 53 (10 Dec 2024 15:27) (53 - 62)  BP: 102/77 (10 Dec 2024 15:27) (102/77 - 125/51)  BP(mean): --  ABP: --  ABP(mean): --  RR: 18 (10 Dec 2024 15:27) (18 - 18)  SpO2: 97% (10 Dec 2024 15:27) (96% - 97%)    O2 Parameters below as of 10 Dec 2024 15:27  Patient On (Oxygen Delivery Method): room air                                    7.5    5.46  )-----------( 357      ( 09 Dec 2024 06:54 )             25.5       CBC Full  -  ( 09 Dec 2024 06:54 )  WBC Count : 5.46 K/uL  RBC Count : 2.90 M/uL  Hemoglobin : 7.5 g/dL  Hematocrit : 25.5 %  Platelet Count - Automated : 357 K/uL  Mean Cell Volume : 87.9 fl  Mean Cell Hemoglobin : 25.9 pg  Mean Cell Hemoglobin Concentration : 29.4 g/dL  Auto Neutrophil # : x  Auto Lymphocyte # : x  Auto Monocyte # : x  Auto Eosinophil # : x  Auto Basophil # : x  Auto Neutrophil % : x  Auto Lymphocyte % : x  Auto Monocyte % : x  Auto Eosinophil % : x  Auto Basophil % : x      12-09    134[L]  |  101  |  22  ----------------------------<  99  4.1   |  31  |  0.77    Ca    8.9      09 Dec 2024 06:54  Mg     2.1     12-10    TPro  7.0  /  Alb  2.6[L]  /  TBili  0.2  /  DBili  x   /  AST  14[L]  /  ALT  9[L]  /  AlkPhos  66  12-09      LIVER FUNCTIONS - ( 09 Dec 2024 06:54 )  Alb: 2.6 g/dL / Pro: 7.0 gm/dL / ALK PHOS: 66 U/L / ALT: 9 U/L / AST: 14 U/L / GGT: x                       Urinalysis Basic - ( 09 Dec 2024 06:54 )    Color: x / Appearance: x / SG: x / pH: x  Gluc: 99 mg/dL / Ketone: x  / Bili: x / Urobili: x   Blood: x / Protein: x / Nitrite: x   Leuk Esterase: x / RBC: x / WBC x   Sq Epi: x / Non Sq Epi: x / Bacteria: x            MEDICATIONS  (STANDING):  aMIOdarone    Tablet 200 milliGRAM(s) Oral daily  atorvastatin 10 milliGRAM(s) Oral at bedtime  cholecalciferol 1000 Unit(s) Oral daily  clopidogrel Tablet 75 milliGRAM(s) Oral daily  dapagliflozin 5 milliGRAM(s) Oral daily  diltiazem    milliGRAM(s) Oral daily  ezetimibe 10 milliGRAM(s) Oral daily  furosemide    Tablet 40 milliGRAM(s) Oral daily  gabapentin 800 milliGRAM(s) Oral three times a day  HYDROmorphone  Injectable 0.5 milliGRAM(s) IV Push once  influenza  Vaccine (HIGH DOSE) 0.5 milliLiter(s) IntraMuscular once  lactobacillus acidophilus 1 Tablet(s) Oral daily  meropenem Injectable 1000 milliGRAM(s) IV Push every 8 hours  metoprolol succinate ER 25 milliGRAM(s) Oral daily  montelukast 10 milliGRAM(s) Oral at bedtime  spironolactone 25 milliGRAM(s) Oral daily

## 2024-12-10 NOTE — CONSULT NOTE ADULT - ATTENDING COMMENTS
Patient seen and examined with resident at bedside.  Agree with physical exam findings and treatment plan.  Continue the wound care of left lower extremity wound and right heel wound as ordered.
seen and examined PAD with b/l lower extremity wounds R>L patient also with leg pain baseline was previously worked up for neuro pain ubt may be secondary to PAD  -discussed with patient given nonhealing wound in RLE would plan for revasc procedure/intervention   -will obtain CTA given poor fem pulse to assess for inflow disease   -also would like LOWELL/PVR for baseline and eventual surveillance   -hold AC in anticipation for possible intervention (full intervention plan will depend on CTA)

## 2024-12-11 LAB
BASOPHILS # BLD AUTO: 0.02 K/UL — SIGNIFICANT CHANGE UP (ref 0–0.2)
BASOPHILS NFR BLD AUTO: 0.4 % — SIGNIFICANT CHANGE UP (ref 0–2)
BUN SERPL-MCNC: 21 MG/DL — SIGNIFICANT CHANGE UP (ref 7–23)
CALCIUM SERPL-MCNC: 8.7 MG/DL — SIGNIFICANT CHANGE UP (ref 8.5–10.1)
CHLORIDE SERPL-SCNC: 102 MMOL/L — SIGNIFICANT CHANGE UP (ref 96–108)
CO2 SERPL-SCNC: 31 MMOL/L — SIGNIFICANT CHANGE UP (ref 22–31)
CREAT SERPL-MCNC: 0.78 MG/DL — SIGNIFICANT CHANGE UP (ref 0.5–1.3)
CULTURE RESULTS: SIGNIFICANT CHANGE UP
CULTURE RESULTS: SIGNIFICANT CHANGE UP
EGFR: 79 ML/MIN/1.73M2 — SIGNIFICANT CHANGE UP
EOSINOPHIL # BLD AUTO: 0.32 K/UL — SIGNIFICANT CHANGE UP (ref 0–0.5)
EOSINOPHIL NFR BLD AUTO: 6.3 % — HIGH (ref 0–6)
GLUCOSE SERPL-MCNC: 103 MG/DL — HIGH (ref 70–99)
HCT VFR BLD CALC: 26.1 % — LOW (ref 34.5–45)
HGB BLD-MCNC: 7.6 G/DL — LOW (ref 11.5–15.5)
IMM GRANULOCYTES NFR BLD AUTO: 0.4 % — SIGNIFICANT CHANGE UP (ref 0–0.9)
LYMPHOCYTES # BLD AUTO: 0.66 K/UL — LOW (ref 1–3.3)
LYMPHOCYTES # BLD AUTO: 12.9 % — LOW (ref 13–44)
MAGNESIUM SERPL-MCNC: 2 MG/DL — SIGNIFICANT CHANGE UP (ref 1.6–2.6)
MCHC RBC-ENTMCNC: 25.6 PG — LOW (ref 27–34)
MCHC RBC-ENTMCNC: 29.1 G/DL — LOW (ref 32–36)
MCV RBC AUTO: 87.9 FL — SIGNIFICANT CHANGE UP (ref 80–100)
MONOCYTES # BLD AUTO: 0.45 K/UL — SIGNIFICANT CHANGE UP (ref 0–0.9)
MONOCYTES NFR BLD AUTO: 8.8 % — SIGNIFICANT CHANGE UP (ref 2–14)
NEUTROPHILS # BLD AUTO: 3.63 K/UL — SIGNIFICANT CHANGE UP (ref 1.8–7.4)
NEUTROPHILS NFR BLD AUTO: 71.2 % — SIGNIFICANT CHANGE UP (ref 43–77)
PLATELET # BLD AUTO: 352 K/UL — SIGNIFICANT CHANGE UP (ref 150–400)
POTASSIUM SERPL-MCNC: 4 MMOL/L — SIGNIFICANT CHANGE UP (ref 3.5–5.3)
POTASSIUM SERPL-SCNC: 4 MMOL/L — SIGNIFICANT CHANGE UP (ref 3.5–5.3)
RBC # BLD: 2.97 M/UL — LOW (ref 3.8–5.2)
RBC # FLD: 17.1 % — HIGH (ref 10.3–14.5)
SODIUM SERPL-SCNC: 132 MMOL/L — LOW (ref 135–145)
SPECIMEN SOURCE: SIGNIFICANT CHANGE UP
SPECIMEN SOURCE: SIGNIFICANT CHANGE UP
WBC # BLD: 5.1 K/UL — SIGNIFICANT CHANGE UP (ref 3.8–10.5)
WBC # FLD AUTO: 5.1 K/UL — SIGNIFICANT CHANGE UP (ref 3.8–10.5)

## 2024-12-11 PROCEDURE — 99233 SBSQ HOSP IP/OBS HIGH 50: CPT

## 2024-12-11 PROCEDURE — 99232 SBSQ HOSP IP/OBS MODERATE 35: CPT

## 2024-12-11 PROCEDURE — 99231 SBSQ HOSP IP/OBS SF/LOW 25: CPT | Mod: GC

## 2024-12-11 PROCEDURE — 93923 UPR/LXTR ART STDY 3+ LVLS: CPT | Mod: 26

## 2024-12-11 RX ORDER — ENOXAPARIN SODIUM 30 MG/.3ML
100 INJECTION SUBCUTANEOUS ONCE
Refills: 0 | Status: COMPLETED | OUTPATIENT
Start: 2024-12-11 | End: 2024-12-11

## 2024-12-11 RX ORDER — ENOXAPARIN SODIUM 30 MG/.3ML
100 INJECTION SUBCUTANEOUS EVERY 12 HOURS
Refills: 0 | Status: DISCONTINUED | OUTPATIENT
Start: 2024-12-11 | End: 2024-12-11

## 2024-12-11 RX ADMIN — ENOXAPARIN SODIUM 100 MILLIGRAM(S): 30 INJECTION SUBCUTANEOUS at 15:32

## 2024-12-11 RX ADMIN — GABAPENTIN 800 MILLIGRAM(S): 300 CAPSULE ORAL at 21:16

## 2024-12-11 RX ADMIN — Medication 1 TABLET(S): at 11:05

## 2024-12-11 RX ADMIN — DAPAGLIFLOZIN 5 MILLIGRAM(S): 10 TABLET, FILM COATED ORAL at 11:06

## 2024-12-11 RX ADMIN — MEROPENEM 1000 MILLIGRAM(S): 500 INJECTION, POWDER, FOR SOLUTION INTRAVENOUS at 15:33

## 2024-12-11 RX ADMIN — MEROPENEM 1000 MILLIGRAM(S): 500 INJECTION, POWDER, FOR SOLUTION INTRAVENOUS at 21:15

## 2024-12-11 RX ADMIN — GABAPENTIN 800 MILLIGRAM(S): 300 CAPSULE ORAL at 05:41

## 2024-12-11 RX ADMIN — Medication 1000 UNIT(S): at 11:05

## 2024-12-11 RX ADMIN — CLOPIDOGREL 75 MILLIGRAM(S): 75 TABLET, FILM COATED ORAL at 11:05

## 2024-12-11 RX ADMIN — MEROPENEM 1000 MILLIGRAM(S): 500 INJECTION, POWDER, FOR SOLUTION INTRAVENOUS at 05:40

## 2024-12-11 RX ADMIN — Medication 25 MILLIGRAM(S): at 11:06

## 2024-12-11 RX ADMIN — MONTELUKAST SODIUM 10 MILLIGRAM(S): 10 TABLET ORAL at 21:18

## 2024-12-11 RX ADMIN — DILTIAZEM HYDROCHLORIDE 120 MILLIGRAM(S): 240 CAPSULE, COATED, EXTENDED RELEASE ORAL at 11:06

## 2024-12-11 RX ADMIN — GABAPENTIN 800 MILLIGRAM(S): 300 CAPSULE ORAL at 15:33

## 2024-12-11 RX ADMIN — Medication 10 MILLIGRAM(S): at 11:06

## 2024-12-11 RX ADMIN — Medication 10 MILLIGRAM(S): at 21:16

## 2024-12-11 RX ADMIN — METOPROLOL TARTRATE 25 MILLIGRAM(S): 100 TABLET, FILM COATED ORAL at 09:04

## 2024-12-11 RX ADMIN — ZOLPIDEM TARTRATE 5 MILLIGRAM(S): 10 TABLET ORAL at 21:29

## 2024-12-11 RX ADMIN — AMIODARONE HYDROCHLORIDE 200 MILLIGRAM(S): 200 TABLET ORAL at 05:41

## 2024-12-11 RX ADMIN — FUROSEMIDE 40 MILLIGRAM(S): 40 TABLET ORAL at 11:06

## 2024-12-11 NOTE — PROGRESS NOTE ADULT - ASSESSMENT
A: 75 y/o Female seen for the following:   -Rt heel partial thickness pressure wound, stable without acute SOI  -Lt posterior lower leg partial thickness venous stasis wound, stable without acute SOI  -BLE venous stasis    P:   Chart reviewed and Patient evaluated  CT scan reviewed: showing diffuse circumferential skin thickening, subcutaneous edema, and stranding which may be seen with cellulitis or edema within fluid overload. No organized collection or tract gas. No evidence of osteomyelitis.  Wound to Rt heel is partial thickness, granular and surrounding predominant hyperkeratotic tissue, improving size dimensions since last admission, no active discharge, no crepitus, no erythema, no malodor, no PTB, stable without gross SOI.   Wound to the posterior Left lower mid leg, with some serous drainage, no malodor, superficial in nature, stable without SOI.   WC: betadine with dry sterile dressing to Rt heel. Xeroform and Compressive dressings applied to LLE  Vascular reccs appreciated  WBAT to BLE  Offload bilateral heels with two pillows under bilateral calfs or total CAIR boots to prevent further soft tissue damage as pt is has been predominately bedbound or sitting on a chair while placing pressure to heels.    No acute podiatric intervention warranted at this time. Continue with local wound care.   Pt to follow up at [facility] with Dr. Stovall for continued wound care treatment.   All additional care per Med appreciated  Patient demonstrated verbal understanding of all interventions and tolerated interventions well without any complications.       Case D/W with attending Dr. Stovall

## 2024-12-11 NOTE — PROGRESS NOTE ADULT - ASSESSMENT
74-year-old F, with PMH DM taken off metformin 1 month ago (after decreased HbA1C), TAVR 8/2024 on Plavix followed by a two month rehab stay, A-fib on Xarelto, CHF, HLD, PVD, lower extremity elephantiasis with varicose veins, who is admitted with CHF exacerbation. Vascular consulted for management of bilateral CFA stenoses as on arterial duplex. Femoral pulses asymmetric, no pedal pulses but doppler signals of DP/PT bilaterally present.    Recommendations:  -F/u  CTA aorta w/ BLE run off to toes  -Obtain LOWELL/PVR BLEs  -Rest of care per primary team   -local wound care     Discussed with Dr. Manjarrez

## 2024-12-11 NOTE — PROGRESS NOTE ADULT - ASSESSMENT
74-year-old F, with PMH DM taken off metformin 1 month ago (after decreased HbA1C) , TAVR 8/2024 on Plavix followed by a two month rehab stay, A-fib on Xarelto, CHF, HLD, PVD, lower extremity elephantiasis  with varicose veins, who presented to [place] for SOB and lower extremity swelling that has been present for few weeks - worsening this past week. Pt states she has orthopnea and decreased ambulation due to shortness of breath. PT denies any chest pain, fevers, chills, night sweats. In the ED VSS. Pro-elevated trop negative ekg sinus ankush. CXR showed b/l pleural effusions admitted for CHF exacerbation. UA positive, urine cx growing ESBL Ecoli, started on IV merrem.     Fever. Pyuria. UTI with ESBL Ecoli.    - imaging reviewed  - agree with IV meropenem 1mgq8h #2   - 3 day course  - contact precautions  - monitor temps  - tolerating abx well so far; no side effects noted  - reason for abx use and side effects reviewed with patient  - supportive care  - fu cbc     IV to Po abx token not applicable to case

## 2024-12-11 NOTE — PROGRESS NOTE ADULT - SUBJECTIVE AND OBJECTIVE BOX
CHIEF COMPLAINT: Patient is a 74y old  Female who presents with a chief complaint of Localized edema     (05 Dec 2024 12:41)      HPI: HPI:    HPI:  74-year-old F, with PMH DM taken off metformin 1 month ago (after decreased HbA1C) ,  TAVR 8/2024 on Plavix followed by a two month rehab stay, A-fib on Xarelto, CHF, HLD, PVD, lower extremity elephantiasis  with varicose veins, who presented to [place] for SOB and lower extremity swelling that has been present for few weeks worsening this past week. Pt states she has orthopnea and decreased ambulation due to shortness of breath. PT denies any chest pain, fevers, chills, nightsweats. In the ED VSS. Propbp elevated trop negative ekg sinus ankush. CXR showed b/l pleural effusions admitted for CHF exacerbation.     PAST MEDICAL & SURGICAL HISTORY:  Diabetes mellitus type II, controlled      Atrial fibrillation      Congestive heart failure      Dyspnea      Morbid obesity with BMI of 40.0-44.9, adult      Neuropathy      Peripheral venous insufficiency      Thyroid nodule      HTN (hypertension)      Cellulitis      At risk for sleep apnea      History of esophagogastroduodenoscopy (EGD)      H/O colonoscopy      Other complications of gastric band procedure      S/P left knee arthroscopy      Status post medial meniscus repair      History of cholecystectomy      S/P cataract surgery        FAMILY HISTORY:  Family history of breast cancer in mother    Family history of diabetes mellitus in mother    FHx: heart disease (Sibling)      Social History:      Allergies    latex (Unknown)  [This allergen will not trigger allergy alert] IV DYE, IODINE CONTRAST (Unknown)  PC Pen VK (Rash)  penicillin (Hives; Urticaria)    Intolerances        MEDICATIONS  (STANDING):  aMIOdarone    Tablet 200 milliGRAM(s) Oral daily  atorvastatin 10 milliGRAM(s) Oral at bedtime  cholecalciferol 1000 Unit(s) Oral daily  ezetimibe 10 milliGRAM(s) Oral daily  gabapentin 800 milliGRAM(s) Oral three times a day  lactobacillus acidophilus 1 Tablet(s) Oral daily  metoprolol succinate ER 25 milliGRAM(s) Oral daily  montelukast 10 milliGRAM(s) Oral at bedtime    MEDICATIONS  (PRN):  acetaminophen     Tablet .. 650 milliGRAM(s) Oral every 6 hours PRN Temp greater or equal to 38C (100.4F), Mild Pain (1 - 3)  aluminum hydroxide/magnesium hydroxide/simethicone Suspension 30 milliLiter(s) Oral every 4 hours PRN Dyspepsia  melatonin 3 milliGRAM(s) Oral at bedtime PRN Insomnia  ondansetron Injectable 4 milliGRAM(s) IV Push every 8 hours PRN Nausea and/or Vomiting  oxyCODONE    IR 5 milliGRAM(s) Oral at bedtime PRN Severe Pain (7 - 10)  zolpidem 5 milliGRAM(s) Oral at bedtime PRN Insomnia      ROS:  10 point ROS negative except for ones mentioned in HPI    PE   HEENT:  Head is normocephalic    Skin:  Warm and dry without any rash   NECK:  Supple without lymphadenopathy.   HEART:  Regular rate and rhythm. normal S1 and S2, No M/R/G  LUNGS:  dec breath sounds   ABDOMEN:  Soft, nontender, nondistended with good bowel sounds heard  EXTREMITIES:  b/l pitting edema,   NEUROLOGICAL:  Gross nonfocal      PEx  T(C): 36.1 (12-04-24 @ 17:29), Max: 36.6 (12-04-24 @ 14:14)  HR: 53 (12-04-24 @ 17:29) (53 - 57)  BP: 117/51 (12-04-24 @ 17:29) (116/48 - 117/51)  RR: 16 (12-04-24 @ 17:29) (16 - 16)  SpO2: 100% (12-04-24 @ 17:29) (97% - 100%)  Wt(kg): --                        7.5    7.91  )-----------( 335      ( 04 Dec 2024 15:04 )             24.9     12-04    134[L]  |  105  |  17  ----------------------------<  98  4.6   |  26  |  0.84    Ca    8.9      04 Dec 2024 15:04  Mg     1.8     12-04    TPro  7.1  /  Alb  2.9[L]  /  TBili  0.4  /  DBili  x   /  AST  9[L]  /  ALT  8[L]  /  AlkPhos  83  12-04    CAPILLARY BLOOD GLUCOSE        PT/INR - ( 04 Dec 2024 15:04 )   PT: 39.8 sec;   INR: 3.41 ratio         PTT - ( 04 Dec 2024 15:04 )  PTT:48.8 sec  Urinalysis Basic - ( 04 Dec 2024 15:04 )    Color: x / Appearance: x / SG: x / pH: x  Gluc: 98 mg/dL / Ketone: x  / Bili: x / Urobili: x   Blood: x / Protein: x / Nitrite: x   Leuk Esterase: x / RBC: x / WBC x   Sq Epi: x / Non Sq Epi: x / Bacteria: x                Radiology/Imaging, I have personally reviewed:      IMPRESSION:    No femoropopliteal DVT. Limited evaluation of the calf veins due to   edema. Consider repeat evaluation in 7-10 days if clinical concern   persists.  IMPRESSION: Cardiomegaly with pulmonary venous congestion perhaps   slightly increased on the current exam and is without evidence of pleural   effusion.   (04 Dec 2024 19:20)      PMHx: PAST MEDICAL & SURGICAL HISTORY:  Diabetes mellitus type II, controlled      Atrial fibrillation      Congestive heart failure      Dyspnea      Morbid obesity with BMI of 40.0-44.9, adult      Neuropathy      Peripheral venous insufficiency      Thyroid nodule      HTN (hypertension)      Cellulitis      At risk for sleep apnea      History of esophagogastroduodenoscopy (EGD)      H/O colonoscopy      Other complications of gastric band procedure      S/P left knee arthroscopy      Status post medial meniscus repair      History of cholecystectomy      S/P cataract surgery            Soc Hx:     FAMILY HISTORY:  Family history of breast cancer in mother    Family history of diabetes mellitus in mother    FHx: heart disease (Sibling)        Allergies: Allergies    latex (Unknown)  [This allergen will not trigger allergy alert] IV DYE, IODINE CONTRAST (Unknown)  PC Pen VK (Rash)  penicillin (Hives; Urticaria)    Intolerances          REVIEW OF SYSTEMS:    no change in symptoms    ICU Vital Signs Last 24 Hrs  T(C): 36.8 (11 Dec 2024 05:30), Max: 36.8 (11 Dec 2024 05:30)  T(F): 98.2 (11 Dec 2024 05:30), Max: 98.2 (11 Dec 2024 05:30)  HR: 56 (11 Dec 2024 05:30) (53 - 62)  BP: 116/49 (11 Dec 2024 05:30) (102/77 - 125/51)  BP(mean): --  ABP: --  ABP(mean): --  RR: 18 (11 Dec 2024 05:30) (18 - 18)  SpO2: 95% (11 Dec 2024 05:30) (95% - 98%)    O2 Parameters below as of 11 Dec 2024 05:30  Patient On (Oxygen Delivery Method): room air                PHYSICAL EXAM:   Patient in NAD  Neck: No JVD; Carotids:  2+ without bruits  Respiratory:  Clear to A&P  Cardiovascular: S1 and S2, regular rate and rhythm, no Murmurs, gallops or rubs      MEDICATIONS  (STANDING):  aMIOdarone    Tablet 200 milliGRAM(s) Oral daily  atorvastatin 10 milliGRAM(s) Oral at bedtime  cholecalciferol 1000 Unit(s) Oral daily  clopidogrel Tablet 75 milliGRAM(s) Oral daily  dapagliflozin 5 milliGRAM(s) Oral daily  diltiazem    milliGRAM(s) Oral daily  ezetimibe 10 milliGRAM(s) Oral daily  furosemide    Tablet 40 milliGRAM(s) Oral daily  gabapentin 800 milliGRAM(s) Oral three times a day  HYDROmorphone  Injectable 0.5 milliGRAM(s) IV Push once  influenza  Vaccine (HIGH DOSE) 0.5 milliLiter(s) IntraMuscular once  lactobacillus acidophilus 1 Tablet(s) Oral daily  meropenem Injectable 1000 milliGRAM(s) IV Push every 8 hours  metoprolol succinate ER 25 milliGRAM(s) Oral daily  montelukast 10 milliGRAM(s) Oral at bedtime  spironolactone 25 milliGRAM(s) Oral daily      LABS: All Labs Reviewed:  Blood Culture:   BNP Pro-Brain Natriuretic Peptide (12.04.24 @ 15:04) Pro-Brain Natriuretic Peptide: 1797 pg/mL                        7.6    5.10  )-----------( 352      ( 11 Dec 2024 06:49 )             26.1     CBC             WBC Count: 8.06 K/uL (12-06 @ 06:28)              Hemoglobin: 7.3 g/dL (12-06 @ 06:28)  Hemoglobin: 7.2 g/dL (12-05 @ 07:09)  Hemoglobin: 7.5 g/dL (12-04 @ 15:04)              Hematocrit: 24.4 % (12-06 @ 06:28)  Hematocrit: 23.6 % (12-05 @ 07:09)  Hematocrit: 24.9 % (12-04 @ 15:04)              Mean Cell Volume: 86.8 fl (12-06 @ 06:28)  Mean Cell Volume: 87.4 fl (12-05 @ 07:09)  Mean Cell Volume: 88.6 fl (12-04 @ 15:04)              Platelet Count - Automated: 336 K/uL (12-06 @ 06:28)  Platelet Count - Automated: 334 K/uL (12-05 @ 07:09)  Platelet Count - Automated: 335 K/uL (12-04 @ 15:04)                            Cardiac markers   Troponin I, High Sensitivity (12.04.24 @ 15:04) Troponin I, High Sensitivity Result: 11.96         12-11    132[L]  |  102  |  21  ----------------------------<  103[H]  4.0   |  31  |  0.78    Ca    8.7      11 Dec 2024 06:49  Mg     2.0     12-11                                    [place]        Sodium: 135 mmol/L (12-06 @ 06:28)  Sodium: 133 mmol/L (12-05 @ 07:09)  Sodium: 134 mmol/L (12-04 @ 15:04)          Potassium: 3.6 mmol/L (12-06 @ 06:28)  Potassium: 3.8 mmol/L (12-05 @ 07:09)  Potassium: 4.6 mmol/L (12-04 @ 15:04)          Blood Urea Nitrogen: 19 mg/dL (12-06 @ 06:28)  Blood Urea Nitrogen: 18 mg/dL (12-05 @ 07:09)  Blood Urea Nitrogen: 17 mg/dL (12-04 @ 15:04)          Creatinine 0.80  Creatinine 0.90  Creatinine 0.84          Magnesium: 1.9 mg/dL (12-06 @ 06:28)  Magnesium: 1.7 mg/dL (12-05 @ 07:09)  Magnesium: 1.8 mg/dL (12-04 @ 19:12)  Magnesium: 1.8 mg/dL (12-04 @ 15:04)          Protein Total: 6.6 gm/dL (12-06 @ 06:28)  Protein Total: 7.1 gm/dL (12-04 @ 15:04)                  Calcium: 8.5 mg/dL (12-06 @ 06:28)  Calcium: 8.8 mg/dL (12-05 @ 07:09)  Calcium: 8.9 mg/dL (12-04 @ 15:04)                  Bilirubin Total: 0.5 mg/dL (12-06 @ 06:28)  Bilirubin Total: 0.4 mg/dL (12-04 @ 15:04)          Alanine Aminotransferase (ALT/SGPT): <6 U/L (12-06 @ 06:28)  Alanine Aminotransferase (ALT/SGPT): 8 U/L (12-04 @ 15:04)          Aspartate Aminotransferase (AST/SGOT): 6 U/L (12-06 @ 06:28)  Aspartate Aminotransferase (AST/SGOT): 9 U/L (12-04 @ 15:04)                 INR: 3.41 ratio (12-04 @ 15:04)             RADIOLOGY:< from: CT Lower Extremity No Cont, Bilateral (12.05.24 @ 12:06) >  IMPRESSION:    CT of bilateral lower extremities demonstrates diffuse circumferential   skin thickening, subcutaneous edema, and stranding which may be seen with   cellulitis or edema within fluid overload. No organized collection or   tract gas. No evidence of osteomyelitis.    < end of copied text >  < from: Xray Chest 1 View- PORTABLE-Urgent (12.04.24 @ 14:58) >  IMPRESSION: Cardiomegaly with pulmonary venous congestion perhaps   slightly increased on the current exam and is without evidence of pleural   effusion.    < end of copied text >      EKG:  SB; IVCD; diffuse mild nondiagnostic st-t changes-unchanged compared to recent Ekg in my office        ECHO:  < from: TTE W or WO Ultrasound Enhancing Agent (12.05.24 @ 13:50) >  CONCLUSIONS:      1. Left ventricular cavity is moderately dilated. Left ventricular wall thickness is normal. Left ventricular systolic function is normal with an ejection fraction of 56 % by Ramirez's method of disks.   2. There is severe (grade 3) left ventricular diastolic dysfunction.   3. Normal right ventricular cavity size and normal right ventricular systolic function.   4. Left atrium is severely dilated.   5. The right atrium is mildly to moderately.   6. Mild mitral valve stenosis.   7. Mild mitral regurgitation.   8. Moderate tricuspid regurgitation.   9. Estimated pulmonary artery systolic pressure is 54 mmHg, consistent with moderate pulmonary hypertension.  10. A Transcatheter deployed (TAVR) valve replacement is present in the aortic position The prosthetic valve is well seated. No intravalvular regurgitation.    < end of copied text >

## 2024-12-11 NOTE — PROGRESS NOTE ADULT - ASSESSMENT
74 year old woman with the above PMH admitted with acute on chronic HFpEF.  No evidence for ACS.  Patient staying in sinus rhythm post ablation.  I agree with IV furosemide therapy.  I will add SGLT2 therapy.  Hopefully discharge soon.    12/11/24-Patient appears stable from a cardiac standpoint.  All of her significant cardiac issues have been addressed(aortic stenosis, CAD and afib).  There are therefore no cardiac contraindications to the proposed vascular intervention procedure.

## 2024-12-11 NOTE — PROGRESS NOTE ADULT - SUBJECTIVE AND OBJECTIVE BOX
74-year-old F, with PMH DM taken off metformin 1 month ago (after decreased HbA1C) ,  TAVR 8/2024 on Plavix followed by a two month rehab stay, A-fib on Xarelto, CHF, HLD, PVD, lower extremity elephantiasis  with varicose veins, who presented to [place] for SOB and lower extremity swelling that has been present for few weeks worsening this past week. Pt states she has orthopnea and decreased ambulation due to shortness of breath. PT denies any chest pain, fevers, chills, nightsweats. In the ED VSS. Propbp elevated trop negative ekg sinus ankush. CXR showed b/l pleural effusions admitted for CHF exacerbation.       Subjective: Patient seen and examined. Mildly more nausea again today, Etiology, opioids vs abx.  Continue to have leg pain will repeat US Venous and obtain  Arterial dopplers of lower extremities. Denies chest pain or worsening shortness of breath. Continue IV abx for UTI.  CXR without consolidation      Additional results/Imaging, I have personally reviewed:All other systems reviewed and found to be negative with the exception of what has been described above    .PHYSICAL EXAM:  Gen: No acute distress   HEENT:  Head is normocephalic    Skin:  Warm and dry without any rash   NECK:  Supple without lymphadenopathy.   HEART:  Regular rate and rhythm. normal S1 and S2, No M/R/G  LUNGS:  dec breath sounds   ABDOMEN:  Soft, nontender, nondistended with good bowel sounds heard  EXTREMITIES:  b/l pitting edema,   NEUROLOGICAL:  Gross nonfocal      labs reviewed

## 2024-12-11 NOTE — PROGRESS NOTE ADULT - SUBJECTIVE AND OBJECTIVE BOX
12/11/24: Pt seen by podiatry team for day wounds with attending present, pt resting comfortably, NAD. Dressings clean, dry and intact to day feet.     PMH: Diabetes mellitus type II, controlled    Atrial fibrillation    Congestive heart failure    Dyspnea    Morbid obesity with BMI of 40.0-44.9, adult    Neuropathy    Peripheral venous insufficiency    Thyroid nodule    HTN (hypertension)    Cellulitis    At risk for sleep apnea      PSH:History of esophagogastroduodenoscopy (EGD)    H/O colonoscopy    Other complications of gastric band procedure    S/P left knee arthroscopy    Status post medial meniscus repair    History of cholecystectomy    S/P cataract surgery        Allergies:latex (Unknown)  [This allergen will not trigger allergy alert] IV DYE, IODINE CONTRAST (Unknown)  PC Pen VK (Rash)  penicillin (Hives; Urticaria)      Labs:                        7.6    5.10  )-----------( 352      ( 11 Dec 2024 06:49 )             26.1       12-11    132[L]  |  102  |  21  ----------------------------<  103[H]  4.0   |  31  |  0.78    Ca    8.7      11 Dec 2024 06:49  Mg     2.0     12-11  Vital Signs Last 24 Hrs  T(C): 36.3 (11 Dec 2024 08:55), Max: 36.8 (11 Dec 2024 05:30)  T(F): 97.3 (11 Dec 2024 08:55), Max: 98.2 (11 Dec 2024 05:30)  HR: 56 (11 Dec 2024 08:55) (56 - 61)  BP: 110/75 (11 Dec 2024 08:55) (110/75 - 116/49)  BP(mean): --  RR: 18 (11 Dec 2024 08:55) (18 - 18)  SpO2: 92% (11 Dec 2024 08:55) (92% - 98%)    Parameters below as of 11 Dec 2024 08:55  Patient On (Oxygen Delivery Method): room air      REVIEW OF SYSTEMS:    CONSTITUTIONAL: No weakness, fevers or chills  EYES: No visual changes  RESPIRATORY: No cough, wheezing; No shortness of breath  CARDIOVASCULAR: No chest pain or palpitations  GASTROINTESTINAL: No abdominal or epigastric pain. No nausea, vomiting; No diarrhea or constipation.   GENITOURINARY: No dysuria, frequency or hematuria  NEUROLOGICAL: No numbness or weakness  SKIN: See physical examination.  All other review of systems is negative unless indicated above    Physical Exam:   Constitutional: NAD, alert;  Lower Extremity Focus  Derm:  Skin warm, dry and supple bilateral.   Ulceration Right heel wound, partial thickness in nature, wound base granular wound size (approx 1.5 cm X 0.5cm X 0.1cm) and surrounding predominant hyperkeratotic tissue, no stephen-wound erythema/edema, no pus/purulence, no crepitus/fluctuance, no tracking/tunneling, no probe to bone.  Partial thickness ulceration noted to posterior medial Left lower midleg, some serous drainage, superficial wound bed, no malodor,  no crepitus/fluctuance, no tracking/tunneling, no PTB.   Vascular: Dorsalis Pedis and Posterior Tibial pulses weakly palpable  Neuro: Protective sensation intact to the level of the digits bilateral.  MSK: Muscle strength 5/5 all major muscle groups bilateral. Pt is minimally ambulatory       < from: CT Lower Extremity No Cont, Bilateral (12.05.24 @ 12:06) >    ACC: [id number] EXAM:  CT LWR EXT BI   ORDERED BY: LIBIA ALVARES     PROCEDURE DATE:  12/05/2024          INTERPRETATION:  CT OF THE bilateral lower extremities WITHOUT CONTRAST.    CLINICAL INFORMATION: Pain  TECHNIQUE: Axial CT images were obtained of the bilateral lower   extremities with coronal and sagittal reconstructions. No contrast was   administered. Three dimensional reconstructions were obtained.    COMPARISON: CT of the abdomen and pelvis performed 11/18/2024.    FINDINGS:    Bones: There is no acute fracture or dislocation. 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 The pubic symphysis is preserved. There is   moderate bilateral hip osteoarthrosis. Severe tricompartmental knee   osteoarthrosis with bone-on-bone apposition atthe medial compartments   bilaterally. Ring and arc type lesion within the distal tibia felt to be   consistent with benign chondroid lesion.    Soft tissues: Fat-containing umbilical hernia is present. Colonic   diverticula without evidence of diverticulitis. Atherosclerotic vascular   calcifications are present. Diffuse subcutaneous edema and stranding   throughout bilateral lower extremities with circumferential skin   thickening. There is no organized collection. There is no tracking gas.    IMPRESSION:    CT of bilateral lower extremities demonstrates diffuse circumferential   skin thickening, subcutaneous edema, and stranding which may be seen with   cellulitis or edema within fluid overload. No organized collection or   tract gas. No evidence of osteomyelitis.        --- End of Report ---    < end of copied text >

## 2024-12-11 NOTE — PROGRESS NOTE ADULT - SUBJECTIVE AND OBJECTIVE BOX
Date of service: 12-11-24 @ 13:17    pt seen and examined  feels better  afebrile  sitting up in chair     ROS: no fever or chills; denies dizziness, no HA, no SOB or cough, no abdominal pain, no diarrhea or constipation;  no legs pain, no rashes    MEDICATIONS  (STANDING):  aMIOdarone    Tablet 200 milliGRAM(s) Oral daily  atorvastatin 10 milliGRAM(s) Oral at bedtime  cholecalciferol 1000 Unit(s) Oral daily  clopidogrel Tablet 75 milliGRAM(s) Oral daily  dapagliflozin 5 milliGRAM(s) Oral daily  diltiazem    milliGRAM(s) Oral daily  ezetimibe 10 milliGRAM(s) Oral daily  furosemide    Tablet 40 milliGRAM(s) Oral daily  gabapentin 800 milliGRAM(s) Oral three times a day  HYDROmorphone  Injectable 0.5 milliGRAM(s) IV Push once  influenza  Vaccine (HIGH DOSE) 0.5 milliLiter(s) IntraMuscular once  lactobacillus acidophilus 1 Tablet(s) Oral daily  meropenem Injectable 1000 milliGRAM(s) IV Push every 8 hours  metoprolol succinate ER 25 milliGRAM(s) Oral daily  montelukast 10 milliGRAM(s) Oral at bedtime  spironolactone 25 milliGRAM(s) Oral daily    Vital Signs Last 24 Hrs  T(C): 36.7 (11 Dec 2024 16:30), Max: 36.8 (11 Dec 2024 05:30)  T(F): 98.1 (11 Dec 2024 16:30), Max: 98.2 (11 Dec 2024 05:30)  HR: 56 (11 Dec 2024 16:30) (56 - 61)  BP: 107/39 (11 Dec 2024 16:30) (107/39 - 116/49)  BP(mean): --  RR: 18 (11 Dec 2024 16:30) (18 - 18)  SpO2: 95% (11 Dec 2024 16:30) (92% - 98%)    Parameters below as of 11 Dec 2024 16:30  Patient On (Oxygen Delivery Method): room air      PE:  Constitutional: NAD  HEENT: NC/AT, EOMI, PERRLA, conjunctivae clear; ears and nose atraumatic; pharynx benign  Neck: supple; thyroid not palpable  Back: no tenderness  Respiratory: respiratory effort normal; clear to auscultation  Cardiovascular: S1S2 regular, no murmurs  Abdomen: soft, not tender, not distended, positive BS; liver and spleen WNL  Genitourinary: no suprapubic tenderness  Lymphatic: no LN palpable  Musculoskeletal: no muscle tenderness, no joint swelling or tenderness  Extremities: pedal edema  Neurological/ Psychiatric: AxOx3, Judgement and insight normal;  moving all extremities  Skin: no rashes; no palpable lesions    Labs: all available labs reviewed                                   7.6    5.10  )-----------( 352      ( 11 Dec 2024 06:49 )             26.1     12-11    132[L]  |  102  |  21  ----------------------------<  103[H]  4.0   |  31  |  0.78    Ca    8.7      11 Dec 2024 06:49  Mg     2.0     12-11        LIVER FUNCTIONS - ( 09 Dec 2024 06:54 )  Alb: 2.6 g/dL / Pro: 7.0 gm/dL / ALK PHOS: 66 U/L / ALT: 9 U/L / AST: 14 U/L / GGT: x           Urinalysis Basic - ( 09 Dec 2024 06:54 )    Color: x / Appearance: x / SG: x / pH: x  Gluc: 99 mg/dL / Ketone: x  / Bili: x / Urobili: x   Blood: x / Protein: x / Nitrite: x   Leuk Esterase: x / RBC: x / WBC x   Sq Epi: x / Non Sq Epi: x / Bacteria: x    Culture - Urine (12.06.24 @ 17:14)   - Ampicillin: R >16 These ampicillin results predict results for amoxicillin  - Ampicillin/Sulbactam: I 16/8  - Aztreonam: R >16  - Cefazolin: R >16 For uncomplicated UTI with K. pneumoniae, E. coli, or P. mirablis: TOYA <=16 is sensitive and TOYA >=32 is resistant. This also predicts results for oral agents cefaclor, cefdinir, cefpodoxime, cefprozil, cefuroxime axetil, cephalexin and locarbef for uncomplicated UTI. Note that some isolates may be susceptible to these agents while testing resistant to cefazolin.  - Cefepime: R >16  - Ceftriaxone: R >32  - Cefuroxime: R >16  - Ciprofloxacin: R >2  - Ertapenem: S <=0.5  - Gentamicin: S <=2  - Imipenem: S <=1  - Levofloxacin: R >4  - Meropenem: S <=1  - Nitrofurantoin: S <=32 Should not be used to treat pyelonephritis  - Piperacillin/Tazobactam: S <=8  - Tobramycin: S <=2  - Trimethoprim/Sulfamethoxazole: S <=0.5/9.5  Specimen Source: Clean Catch Clean Catch (Midstream)  Culture Results:   >100,000 CFU/ml Escherichia coli ESBL  Organism Identification: Escherichia coli ESBL  Organism: Escherichia coli ESBL  Method Type: TOYA    Radiology: all available radiological tests reviewed    ACC: [id number] EXAM:  US DPLX LWR EXT VEINS COMPL BI   ORDERED BY:   JETT LUNA     PROCEDURE DATE:  12/08/2024          INTERPRETATION:  CLINICAL INFORMATION: Bilateral leg swelling.    COMPARISON: Venous Doppler dated 12/04/2024    TECHNIQUE: Duplex sonography of the BILATERAL LOWER extremity veins with   color and spectral Doppler, with and without compression.    FINDINGS:    RIGHT:  Normal compressibility of the RIGHT common femoral, femoral and popliteal   veins.  Doppler examination shows normal spontaneous and phasic flow.  No RIGHT calf vein thrombosis is detected.    LEFT:  Normal compressibility of the LEFT common femoral, femoral and popliteal   veins.  Doppler examination shows normal spontaneous and phasic flow.  Limited visualized of the LEFT calf veins. No LEFT calf vein thrombosis   detected. Evaluation is limited by overlying bandages.    IMPRESSION:  No evidence of deep venous thrombosis in either lower extremity.    Limited visualization of the left calf veins.        Advanced directives addressed: full resuscitation

## 2024-12-11 NOTE — PROGRESS NOTE ADULT - SUBJECTIVE AND OBJECTIVE BOX
SURGERY DAILY PROGRESS NOTE:     Subjective:  Patient seen and examined at bedside during am rounds. AVSS. Denies any fevers, chills, n/v/d, chest pain or shortness of breath    Objective:    MEDICATIONS  (STANDING):  aMIOdarone    Tablet 200 milliGRAM(s) Oral daily  atorvastatin 10 milliGRAM(s) Oral at bedtime  cholecalciferol 1000 Unit(s) Oral daily  clopidogrel Tablet 75 milliGRAM(s) Oral daily  dapagliflozin 5 milliGRAM(s) Oral daily  diltiazem    milliGRAM(s) Oral daily  ezetimibe 10 milliGRAM(s) Oral daily  furosemide    Tablet 40 milliGRAM(s) Oral daily  gabapentin 800 milliGRAM(s) Oral three times a day  HYDROmorphone  Injectable 0.5 milliGRAM(s) IV Push once  influenza  Vaccine (HIGH DOSE) 0.5 milliLiter(s) IntraMuscular once  lactobacillus acidophilus 1 Tablet(s) Oral daily  meropenem Injectable 1000 milliGRAM(s) IV Push every 8 hours  metoprolol succinate ER 25 milliGRAM(s) Oral daily  montelukast 10 milliGRAM(s) Oral at bedtime  spironolactone 25 milliGRAM(s) Oral daily    MEDICATIONS  (PRN):  acetaminophen     Tablet .. 650 milliGRAM(s) Oral every 6 hours PRN Temp greater or equal to 38C (100.4F), Mild Pain (1 - 3)  aluminum hydroxide/magnesium hydroxide/simethicone Suspension 30 milliLiter(s) Oral every 4 hours PRN Dyspepsia  cyclobenzaprine 10 milliGRAM(s) Oral three times a day PRN Muscle Spasm  melatonin 3 milliGRAM(s) Oral at bedtime PRN Insomnia  ondansetron Injectable 4 milliGRAM(s) IV Push every 8 hours PRN Nausea and/or Vomiting  oxycodone    5 mG/acetaminophen 325 mG 1 Tablet(s) Oral every 6 hours PRN Severe Pain (7 - 10)  zolpidem 5 milliGRAM(s) Oral at bedtime PRN Insomnia      Vital Signs Last 24 Hrs  T(C): 36.8 (11 Dec 2024 05:30), Max: 36.8 (11 Dec 2024 05:30)  T(F): 98.2 (11 Dec 2024 05:30), Max: 98.2 (11 Dec 2024 05:30)  HR: 56 (11 Dec 2024 05:30) (53 - 61)  BP: 116/49 (11 Dec 2024 05:30) (102/77 - 116/49)  BP(mean): --  RR: 18 (11 Dec 2024 05:30) (18 - 18)  SpO2: 95% (11 Dec 2024 05:30) (95% - 98%)    Parameters below as of 11 Dec 2024 05:30  Patient On (Oxygen Delivery Method): room air          Physical Exam:  GENERAL: NAD, well developed, obese  HEAD: Atraumatic, normocephalic  EYES: EOMI, PERRLA, conjunctiva and sclera clear  ENT: moist mucous membrane  NECK: supple, No JVD, midline trachea  CHEST/LUNG: No increased WOB, symmetric excursions  Heart: RRR ppp, no peripheral edema  ABDOMEN: round, soft, nondistended, nontender. no organomegaly  EXTREMITIES: +2 left fem, 1+ right fem pulses, doppler signals of b/l DP biphasic and b/l PT monophasic (weaker on right), dressing of right heel & left calf present  NERVOUS SYSTEM: AOx4, speech clear, no neuro-deficits  MSK: full ROM, no deformities  SKIN: warm to touch, no rash or lesions      I&O's Detail    10 Dec 2024 07:01  -  11 Dec 2024 07:00  --------------------------------------------------------  IN:  Total IN: 0 mL    OUT:    Voided (mL): 1400 mL  Total OUT: 1400 mL    Total NET: -1400 mL          Daily     Daily Weight in k (11 Dec 2024 06:29)    LABS:                        7.6    5.10  )-----------( 352      ( 11 Dec 2024 06:49 )             26.1     12-11    132[L]  |  102  |  21  ----------------------------<  103[H]  4.0   |  31  |  0.78    Ca    8.7      11 Dec 2024 06:49  Mg     2.0     12-11      PT/INR - ( 10 Dec 2024 19:01 )   PT: 16.4 sec;   INR: 1.39 ratio         PTT - ( 10 Dec 2024 19:01 )  PTT:35.3 sec  Urinalysis Basic - ( 11 Dec 2024 06:49 )    Color: x / Appearance: x / SG: x / pH: x  Gluc: 103 mg/dL / Ketone: x  / Bili: x / Urobili: x   Blood: x / Protein: x / Nitrite: x   Leuk Esterase: x / RBC: x / WBC x   Sq Epi: x / Non Sq Epi: x / Bacteria: x            ASSESSMENT/PLAN:

## 2024-12-11 NOTE — PROGRESS NOTE ADULT - ASSESSMENT
74-year-old F, with PMH DM taken off metformin 1 month ago (after decreased HbA1C) ,  TAVR 8/2024 on Plavix followed by a two month rehab stay, A-fib on Xarelto, CHF, HLD, PVD, lower extremity elephantiasis  with varicose veins, who presented to [place] for SOB and lower extremity swelling that has been present for few weeks worsening this past week.  CXR showed b/l pleural effusions admitted for CHF exacerbation.       # HFpEF exacerbation 50-55%  - daily weights, I&O  - TTE - 1. Left ventricular cavity is moderately dilated. Left ventricular wall thickness is normal. Left ventricular systolic function is normal with an ejection fraction of 56 % by Ramirez's method of disks.   2. There is severe (grade 3) left ventricular diastolic dysfunction.  - Continue Lasix 40 IV daily   switched to lasix po  - Cardiology consulted recommendations appreciated   Continue farxiga, restart spironolactone     # B/l  common femoral artery stenosis  angiogram B/l LE by vascular   and for vascular intervention stent vs bypass  pre op eval  no acute medical contraindications for OR by vascular   no sepsis , ESBL UTI on meropenem afebrile, hemodynamically stable , cardio stable     Fever resolved /UTI  esbl ecoli  started meropenem    B/l femoral arty stenosis  vascular w/u underway    Hypokalemia  Oral replacement and restart spironolactone   dispo- d  plan tmrw  # Mild hypervolemic hyponatremia should improve with lasix   improved     # Afib on xeralto s/p ablation

## 2024-12-12 LAB
ANION GAP SERPL CALC-SCNC: 2 MMOL/L — LOW (ref 5–17)
ANISOCYTOSIS BLD QL: SIGNIFICANT CHANGE UP
BASOPHILS # BLD AUTO: 0.28 K/UL — HIGH (ref 0–0.2)
BASOPHILS NFR BLD AUTO: 5 % — HIGH (ref 0–2)
BUN SERPL-MCNC: 21 MG/DL — SIGNIFICANT CHANGE UP (ref 7–23)
CALCIUM SERPL-MCNC: 8.4 MG/DL — LOW (ref 8.5–10.1)
CHLORIDE SERPL-SCNC: 101 MMOL/L — SIGNIFICANT CHANGE UP (ref 96–108)
CO2 SERPL-SCNC: 31 MMOL/L — SIGNIFICANT CHANGE UP (ref 22–31)
CREAT SERPL-MCNC: 0.78 MG/DL — SIGNIFICANT CHANGE UP (ref 0.5–1.3)
EGFR: 79 ML/MIN/1.73M2 — SIGNIFICANT CHANGE UP
EOSINOPHIL # BLD AUTO: 0.3 K/UL — SIGNIFICANT CHANGE UP (ref 0–0.5)
EOSINOPHIL NFR BLD AUTO: 5.5 % — SIGNIFICANT CHANGE UP (ref 0–6)
GLUCOSE SERPL-MCNC: 88 MG/DL — SIGNIFICANT CHANGE UP (ref 70–99)
HCT VFR BLD CALC: 25.5 % — LOW (ref 34.5–45)
HGB BLD-MCNC: 7.7 G/DL — LOW (ref 11.5–15.5)
HYPOCHROMIA BLD QL: SIGNIFICANT CHANGE UP
INR BLD: 1.19 RATIO — HIGH (ref 0.85–1.16)
LYMPHOCYTES # BLD AUTO: 1.14 K/UL — SIGNIFICANT CHANGE UP (ref 1–3.3)
LYMPHOCYTES # BLD AUTO: 20.5 % — SIGNIFICANT CHANGE UP (ref 13–44)
MAGNESIUM SERPL-MCNC: 2.1 MG/DL — SIGNIFICANT CHANGE UP (ref 1.6–2.6)
MANUAL SMEAR VERIFICATION: SIGNIFICANT CHANGE UP
MCHC RBC-ENTMCNC: 26.2 PG — LOW (ref 27–34)
MCHC RBC-ENTMCNC: 30.2 G/DL — LOW (ref 32–36)
MCV RBC AUTO: 86.7 FL — SIGNIFICANT CHANGE UP (ref 80–100)
MICROCYTES BLD QL: SLIGHT — SIGNIFICANT CHANGE UP
MONOCYTES # BLD AUTO: 0.39 K/UL — SIGNIFICANT CHANGE UP (ref 0–0.9)
MONOCYTES NFR BLD AUTO: 7 % — SIGNIFICANT CHANGE UP (ref 2–14)
NEUTROPHILS # BLD AUTO: 3.35 K/UL — SIGNIFICANT CHANGE UP (ref 1.8–7.4)
NEUTROPHILS NFR BLD AUTO: 60.5 % — SIGNIFICANT CHANGE UP (ref 43–77)
NRBC # BLD: 0 /100 WBCS — SIGNIFICANT CHANGE UP (ref 0–0)
NRBC # BLD: SIGNIFICANT CHANGE UP /100 WBCS (ref 0–0)
OVALOCYTES BLD QL SMEAR: SLIGHT — SIGNIFICANT CHANGE UP
PHOSPHATE SERPL-MCNC: 3.9 MG/DL — SIGNIFICANT CHANGE UP (ref 2.5–4.5)
PLAT MORPH BLD: NORMAL — SIGNIFICANT CHANGE UP
PLATELET # BLD AUTO: 363 K/UL — SIGNIFICANT CHANGE UP (ref 150–400)
POTASSIUM SERPL-MCNC: 3.8 MMOL/L — SIGNIFICANT CHANGE UP (ref 3.5–5.3)
POTASSIUM SERPL-SCNC: 3.8 MMOL/L — SIGNIFICANT CHANGE UP (ref 3.5–5.3)
PROTHROM AB SERPL-ACNC: 14 SEC — HIGH (ref 9.9–13.4)
RBC # BLD: 2.94 M/UL — LOW (ref 3.8–5.2)
RBC # FLD: 17.2 % — HIGH (ref 10.3–14.5)
RBC BLD AUTO: ABNORMAL
SODIUM SERPL-SCNC: 134 MMOL/L — LOW (ref 135–145)
TOXIC GRANULES BLD QL SMEAR: PRESENT — SIGNIFICANT CHANGE UP
VARIANT LYMPHS # BLD: 1.5 % — SIGNIFICANT CHANGE UP (ref 0–6)
WBC # BLD: 5.54 K/UL — SIGNIFICANT CHANGE UP (ref 3.8–10.5)
WBC # FLD AUTO: 5.54 K/UL — SIGNIFICANT CHANGE UP (ref 3.8–10.5)

## 2024-12-12 PROCEDURE — 99233 SBSQ HOSP IP/OBS HIGH 50: CPT

## 2024-12-12 RX ORDER — ENOXAPARIN SODIUM 30 MG/.3ML
100 INJECTION SUBCUTANEOUS EVERY 12 HOURS
Refills: 0 | Status: DISCONTINUED | OUTPATIENT
Start: 2024-12-12 | End: 2024-12-16

## 2024-12-12 RX ADMIN — MEROPENEM 1000 MILLIGRAM(S): 500 INJECTION, POWDER, FOR SOLUTION INTRAVENOUS at 14:33

## 2024-12-12 RX ADMIN — MEROPENEM 1000 MILLIGRAM(S): 500 INJECTION, POWDER, FOR SOLUTION INTRAVENOUS at 21:15

## 2024-12-12 RX ADMIN — AMIODARONE HYDROCHLORIDE 200 MILLIGRAM(S): 200 TABLET ORAL at 06:08

## 2024-12-12 RX ADMIN — Medication 10 MILLIGRAM(S): at 21:14

## 2024-12-12 RX ADMIN — MONTELUKAST SODIUM 10 MILLIGRAM(S): 10 TABLET ORAL at 21:14

## 2024-12-12 RX ADMIN — GABAPENTIN 800 MILLIGRAM(S): 300 CAPSULE ORAL at 06:08

## 2024-12-12 RX ADMIN — GABAPENTIN 800 MILLIGRAM(S): 300 CAPSULE ORAL at 21:14

## 2024-12-12 RX ADMIN — MEROPENEM 1000 MILLIGRAM(S): 500 INJECTION, POWDER, FOR SOLUTION INTRAVENOUS at 06:09

## 2024-12-12 RX ADMIN — Medication 10 MILLIGRAM(S): at 12:09

## 2024-12-12 RX ADMIN — Medication 1000 UNIT(S): at 14:33

## 2024-12-12 RX ADMIN — GABAPENTIN 800 MILLIGRAM(S): 300 CAPSULE ORAL at 14:33

## 2024-12-12 RX ADMIN — CLOPIDOGREL 75 MILLIGRAM(S): 75 TABLET, FILM COATED ORAL at 12:09

## 2024-12-12 RX ADMIN — DILTIAZEM HYDROCHLORIDE 120 MILLIGRAM(S): 240 CAPSULE, COATED, EXTENDED RELEASE ORAL at 12:09

## 2024-12-12 RX ADMIN — ENOXAPARIN SODIUM 100 MILLIGRAM(S): 30 INJECTION SUBCUTANEOUS at 21:14

## 2024-12-12 RX ADMIN — ACETAMINOPHEN, DIPHENHYDRAMINE HCL, PHENYLEPHRINE HCL 3 MILLIGRAM(S): 325; 25; 5 TABLET ORAL at 21:14

## 2024-12-12 RX ADMIN — FUROSEMIDE 40 MILLIGRAM(S): 40 TABLET ORAL at 12:09

## 2024-12-12 RX ADMIN — Medication 1 TABLET(S): at 12:09

## 2024-12-12 RX ADMIN — Medication 25 MILLIGRAM(S): at 12:09

## 2024-12-12 NOTE — PROGRESS NOTE ADULT - SUBJECTIVE AND OBJECTIVE BOX
74-year-old F, with PMH DM taken off metformin 1 month ago (after decreased HbA1C) ,  TAVR 8/2024 on Plavix followed by a two month rehab stay, A-fib on Xarelto, CHF, HLD, PVD, lower extremity elephantiasis  with varicose veins, who presented to [place] for SOB and lower extremity swelling that has been present for few weeks worsening this past week. Pt states she has orthopnea and decreased ambulation due to shortness of breath. PT denies any chest pain, fevers, chills, nightsweats. In the ED VSS. Propbp elevated trop negative ekg sinus ankush. CXR showed b/l pleural effusions admitted for CHF exacerbation.       Subjective: Patient seen and examined. Mildly more nausea again today, Etiology, opioids vs abx.  Continue to have leg pain will repeat US Venous and obtain  Arterial dopplers of lower extremities. Denies chest pain or worsening shortness of breath. Continue IV abx for UTI.  CXR without consolidation      Additional results/Imaging, I have personally reviewed:All other systems reviewed and found to be negative with the exception of what has been described above    .PHYSICAL EXAM:  Gen: No acute distress   HEENT:  Head is normocephalic    Skin:  Warm and dry without any rash   NECK:  Supple without lymphadenopathy.   HEART:  Regular rate and rhythm. normal S1 and S2, No M/R/G  LUNGS:  dec breath sounds   ABDOMEN:  Soft, nontender, nondistended with good bowel sounds heard  EXTREMITIES:  b/l pitting edema,   NEUROLOGICAL:  Gross nonfocal      labs reviewed

## 2024-12-12 NOTE — PACU DISCHARGE NOTE - COMMENTS
report given to RN on 3 east. patient transported back to the floor. left groin dressing dry and intact without signs of bleeding/hematoma

## 2024-12-12 NOTE — PROGRESS NOTE ADULT - ASSESSMENT
74-year-old F, with PMH DM taken off metformin 1 month ago (after decreased HbA1C) ,  TAVR 8/2024 on Plavix followed by a two month rehab stay, A-fib on Xarelto, CHF, HLD, PVD, lower extremity elephantiasis  with varicose veins, who presented to [place] for SOB and lower extremity swelling that has been present for few weeks worsening this past week.  CXR showed b/l pleural effusions admitted for CHF exacerbation.       # HFpEF exacerbation 50-55%  - daily weights, I&O  - TTE - 1. Left ventricular cavity is moderately dilated. Left ventricular wall thickness is normal. Left ventricular systolic function is normal with an ejection fraction of 56 % by Ramirez's method of disks.   2. There is severe (grade 3) left ventricular diastolic dysfunction.  - Continue Lasix 40 IV daily   switched to lasix po  - Cardiology consulted recommendations appreciated   Continue farxiga, restart spironolactone     # B/l  common femoral artery stenosis  angiogram B/l LE by vascular   and for vascular intervention stent vs bypass  pre op eval  no acute medical contraindications for OR by vascular   no sepsis , ESBL UTI on meropenem afebrile, hemodynamically stable , cardio stable Fever resolved /UTI  esbl ecoli  started meropenem    B/l femoral arty stenosis  vascular w/u underway    Hypokalemia  Oral replacement and restart spironolactone   dispo- d  plan tmrw    # Mild hypervolemic hyponatremia should improve with lasix   improved     # Afib on xeralto s/p ablation

## 2024-12-12 NOTE — PROGRESS NOTE ADULT - SUBJECTIVE AND OBJECTIVE BOX
SURGERY DAILY PROGRESS NOTE:     Subjective:  Patient seen and examined at bedside during am rounds.   preopped for today's RLE angiogram  AVSS. Denies any fevers, chills, n/v/d, chest pain or shortness of breath    Objective:      Physical Exam:  GENERAL: NAD, well developed, obese  HEAD: Atraumatic, normocephalic  EYES: EOMI, PERRLA, conjunctiva and sclera clear  ENT: moist mucous membrane  NECK: supple, No JVD, midline trachea  CHEST/LUNG: No increased WOB, symmetric excursions  Heart: RRR ppp, no peripheral edema  ABDOMEN: round, soft, nondistended, nontender. no organomegaly  EXTREMITIES: +2 left fem, 1+ right fem pulses, doppler signals of b/l DP biphasic and b/l PT monophasic (weaker on right), dressing of right heel & left calf present  NERVOUS SYSTEM: AOx4, speech clear, no neuro-deficits  MSK: full ROM, no deformities  SKIN: warm to touch, no rash or lesions          Chief complaint:      PMHx:  Diabetes mellitus type II, controlled    Atrial fibrillation    Congestive heart failure    Dyspnea    Morbid obesity with BMI of 40.0-44.9, adult    Neuropathy    Peripheral venous insufficiency    Thyroid nodule    HTN (hypertension)    Cellulitis    At risk for sleep apnea        PSHx:  History of esophagogastroduodenoscopy (EGD)    H/O colonoscopy    Other complications of gastric band procedure    S/P left knee arthroscopy    Status post medial meniscus repair    History of cholecystectomy    S/P cataract surgery        FHx:  Family history of breast cancer in mother    Family history of diabetes mellitus in mother    FHx: heart disease (Sibling)        Vitals:  T(C): 36.4 ( @ 00:32), Max: 36.7 ( @ 16:30)  HR: 56 ( @ 00:32) (56 - 56)  BP: 68/54 ( @ 00:32) (68/54 - 110/75)  RR: 18 ( @ 00:32) (18 - 18)  SpO2: 92% ( @ 00:32) (92% - 95%)      I&Os    .    Labs:   @ 05:47                    7.7  CBC: 5.54>)-------(<363                     25.5                 134 | 101 | 21    CMP:  ----------------------< 88               3.8 | 31 | 0.78                      Ca:8.4  Phos:3.9  M.1               -|      |-        LFTs:  ------|-|-----             -|      |-  12-11 @ 06:49                    7.6  CBC: 5.10>)-------(<352                     26.1                 132 | 102 | 21    CMP:  ----------------------< 103               4.0 | 31 | 0.78                      Ca:8.7  Phos:-  M.0               -|      |-        LFTs:  ------|-|-----             -|      |-        Cultures:        Current Inpatient Medications:  acetaminophen     Tablet .. 650 milliGRAM(s) Oral every 6 hours PRN  aluminum hydroxide/magnesium hydroxide/simethicone Suspension 30 milliLiter(s) Oral every 4 hours PRN  aMIOdarone    Tablet 200 milliGRAM(s) Oral daily  atorvastatin 10 milliGRAM(s) Oral at bedtime  cholecalciferol 1000 Unit(s) Oral daily  clopidogrel Tablet 75 milliGRAM(s) Oral daily  cyclobenzaprine 10 milliGRAM(s) Oral three times a day PRN  diltiazem    milliGRAM(s) Oral daily  ezetimibe 10 milliGRAM(s) Oral daily  furosemide    Tablet 40 milliGRAM(s) Oral daily  gabapentin 800 milliGRAM(s) Oral three times a day  HYDROmorphone  Injectable 0.5 milliGRAM(s) IV Push once  influenza  Vaccine (HIGH DOSE) 0.5 milliLiter(s) IntraMuscular once  lactobacillus acidophilus 1 Tablet(s) Oral daily  melatonin 3 milliGRAM(s) Oral at bedtime PRN  meropenem Injectable 1000 milliGRAM(s) IV Push every 8 hours  metoprolol succinate ER 25 milliGRAM(s) Oral daily  montelukast 10 milliGRAM(s) Oral at bedtime  ondansetron Injectable 4 milliGRAM(s) IV Push every 8 hours PRN  oxycodone    5 mG/acetaminophen 325 mG 1 Tablet(s) Oral every 6 hours PRN  spironolactone 25 milliGRAM(s) Oral daily

## 2024-12-12 NOTE — PROGRESS NOTE ADULT - SUBJECTIVE AND OBJECTIVE BOX
Date of service: 12-12-24 @ 10:36    pt seen and examined  plan for RLE angiogram  no o/n events       ROS: no fever or chills; denies dizziness, no HA, no SOB or cough, no abdominal pain, no diarrhea or constipation;  no legs pain, no rashes    MEDICATIONS  (STANDING):  aMIOdarone    Tablet 200 milliGRAM(s) Oral daily  atorvastatin 10 milliGRAM(s) Oral at bedtime  cholecalciferol 1000 Unit(s) Oral daily  clopidogrel Tablet 75 milliGRAM(s) Oral daily  diltiazem    milliGRAM(s) Oral daily  ezetimibe 10 milliGRAM(s) Oral daily  furosemide    Tablet 40 milliGRAM(s) Oral daily  gabapentin 800 milliGRAM(s) Oral three times a day  HYDROmorphone  Injectable 0.5 milliGRAM(s) IV Push once  influenza  Vaccine (HIGH DOSE) 0.5 milliLiter(s) IntraMuscular once  lactobacillus acidophilus 1 Tablet(s) Oral daily  meropenem Injectable 1000 milliGRAM(s) IV Push every 8 hours  metoprolol succinate ER 25 milliGRAM(s) Oral daily  montelukast 10 milliGRAM(s) Oral at bedtime  spironolactone 25 milliGRAM(s) Oral daily    Vital Signs Last 24 Hrs  T(C): 37 (12 Dec 2024 08:33), Max: 37 (12 Dec 2024 08:33)  T(F): 98.6 (12 Dec 2024 08:33), Max: 98.6 (12 Dec 2024 08:33)  HR: 57 (12 Dec 2024 08:33) (56 - 57)  BP: 133/69 (12 Dec 2024 08:33) (68/54 - 133/69)  BP(mean): --  RR: 18 (12 Dec 2024 08:33) (18 - 18)  SpO2: 98% (12 Dec 2024 08:33) (92% - 98%)    Parameters below as of 12 Dec 2024 08:33  Patient On (Oxygen Delivery Method): room air    PE:  Constitutional: NAD  HEENT: NC/AT, EOMI, PERRLA, conjunctivae clear; ears and nose atraumatic; pharynx benign  Neck: supple; thyroid not palpable  Back: no tenderness  Respiratory: respiratory effort normal; clear to auscultation  Cardiovascular: S1S2 regular, no murmurs  Abdomen: soft, not tender, not distended, positive BS; liver and spleen WNL  Genitourinary: no suprapubic tenderness  Lymphatic: no LN palpable  Musculoskeletal: no muscle tenderness, no joint swelling or tenderness  Extremities: pedal edema  Neurological/ Psychiatric: AxOx3, Judgement and insight normal;  moving all extremities  Skin: no rashes; no palpable lesions    Labs: all available labs reviewed                                   7.7    5.54  )-----------( 363      ( 12 Dec 2024 05:47 )             25.5     12-12    134[L]  |  101  |  21  ----------------------------<  88  3.8   |  31  |  0.78    Ca    8.4[L]      12 Dec 2024 05:47  Phos  3.9     12-12    Urinalysis Basic - ( 09 Dec 2024 06:54 )    Color: x / Appearance: x / SG: x / pH: x  Gluc: 99 mg/dL / Ketone: x  / Bili: x / Urobili: x   Blood: x / Protein: x / Nitrite: x   Leuk Esterase: x / RBC: x / WBC x   Sq Epi: x / Non Sq Epi: x / Bacteria: x    Culture - Urine (12.06.24 @ 17:14)   - Ampicillin: R >16 These ampicillin results predict results for amoxicillin  - Ampicillin/Sulbactam: I 16/8  - Aztreonam: R >16  - Cefazolin: R >16 For uncomplicated UTI with K. pneumoniae, E. coli, or P. mirablis: TOYA <=16 is sensitive and TOYA >=32 is resistant. This also predicts results for oral agents cefaclor, cefdinir, cefpodoxime, cefprozil, cefuroxime axetil, cephalexin and locarbef for uncomplicated UTI. Note that some isolates may be susceptible to these agents while testing resistant to cefazolin.  - Cefepime: R >16  - Ceftriaxone: R >32  - Cefuroxime: R >16  - Ciprofloxacin: R >2  - Ertapenem: S <=0.5  - Gentamicin: S <=2  - Imipenem: S <=1  - Levofloxacin: R >4  - Meropenem: S <=1  - Nitrofurantoin: S <=32 Should not be used to treat pyelonephritis  - Piperacillin/Tazobactam: S <=8  - Tobramycin: S <=2  - Trimethoprim/Sulfamethoxazole: S <=0.5/9.5  Specimen Source: Clean Catch Clean Catch (Midstream)  Culture Results:   >100,000 CFU/ml Escherichia coli ESBL  Organism Identification: Escherichia coli ESBL  Organism: Escherichia coli ESBL  Method Type: TOYA    Radiology: all available radiological tests reviewed    ACC: [id number] EXAM:  US DPLX LWR EXT VEINS COMPL BI   ORDERED BY:   JETT LUNA     PROCEDURE DATE:  12/08/2024          INTERPRETATION:  CLINICAL INFORMATION: Bilateral leg swelling.    COMPARISON: Venous Doppler dated 12/04/2024    TECHNIQUE: Duplex sonography of the BILATERAL LOWER extremity veins with   color and spectral Doppler, with and without compression.    FINDINGS:    RIGHT:  Normal compressibility of the RIGHT common femoral, femoral and popliteal   veins.  Doppler examination shows normal spontaneous and phasic flow.  No RIGHT calf vein thrombosis is detected.    LEFT:  Normal compressibility of the LEFT common femoral, femoral and popliteal   veins.  Doppler examination shows normal spontaneous and phasic flow.  Limited visualized of the LEFT calf veins. No LEFT calf vein thrombosis   detected. Evaluation is limited by overlying bandages.    IMPRESSION:  No evidence of deep venous thrombosis in either lower extremity.    Limited visualization of the left calf veins.        Advanced directives addressed: full resuscitation

## 2024-12-12 NOTE — PROGRESS NOTE ADULT - ASSESSMENT
74-year-old F, with PMH DM taken off metformin 1 month ago (after decreased HbA1C), TAVR 8/2024 on Plavix followed by a two month rehab stay, A-fib on Xarelto, CHF, HLD, PVD, lower extremity elephantiasis with varicose veins, who is admitted with CHF exacerbation. Vascular consulted for management of bilateral CFA stenoses as on arterial duplex. Femoral pulses asymmetric, no pedal pulses but doppler signals of DP/PT bilaterally present.  CTA aorta reviewed    Recommendations:  -Preopped for today's RLE angiogram in AM  -Obtain LOWELL/PVR BLEs  -Pain control PRN  -Rest of care per primary team   -local wound care     Plan will be discussed with Vascular surgery attending, Dr. Manjarrez

## 2024-12-12 NOTE — PROGRESS NOTE ADULT - ASSESSMENT
74-year-old F, with PMH DM taken off metformin 1 month ago (after decreased HbA1C) , TAVR 8/2024 on Plavix followed by a two month rehab stay, A-fib on Xarelto, CHF, HLD, PVD, lower extremity elephantiasis  with varicose veins, who presented to [place] for SOB and lower extremity swelling that has been present for few weeks - worsening this past week. Pt states she has orthopnea and decreased ambulation due to shortness of breath. PT denies any chest pain, fevers, chills, night sweats. In the ED VSS. Pro-elevated trop negative ekg sinus ankush. CXR showed b/l pleural effusions admitted for CHF exacerbation. UA positive, urine cx growing ESBL Ecoli, started on IV merrem.     Fever. Pyuria. UTI with ESBL Ecoli.    - imaging reviewed  - on IV meropenem 1mgq8h #3  - 3 day course  - contact precautions  - monitor temps  - tolerating abx well so far; no side effects noted  - reason for abx use and side effects reviewed with patient  - supportive care  - fu cbc     IV to Po abx token not applicable to case

## 2024-12-13 LAB
ANION GAP SERPL CALC-SCNC: 4 MMOL/L — LOW (ref 5–17)
BASOPHILS # BLD AUTO: 0.04 K/UL — SIGNIFICANT CHANGE UP (ref 0–0.2)
BASOPHILS NFR BLD AUTO: 0.6 % — SIGNIFICANT CHANGE UP (ref 0–2)
BUN SERPL-MCNC: 21 MG/DL — SIGNIFICANT CHANGE UP (ref 7–23)
CALCIUM SERPL-MCNC: 8.7 MG/DL — SIGNIFICANT CHANGE UP (ref 8.5–10.1)
CHLORIDE SERPL-SCNC: 101 MMOL/L — SIGNIFICANT CHANGE UP (ref 96–108)
CO2 SERPL-SCNC: 31 MMOL/L — SIGNIFICANT CHANGE UP (ref 22–31)
CREAT SERPL-MCNC: 0.84 MG/DL — SIGNIFICANT CHANGE UP (ref 0.5–1.3)
EGFR: 72 ML/MIN/1.73M2 — SIGNIFICANT CHANGE UP
EOSINOPHIL # BLD AUTO: 0.47 K/UL — SIGNIFICANT CHANGE UP (ref 0–0.5)
EOSINOPHIL NFR BLD AUTO: 6.9 % — HIGH (ref 0–6)
GLUCOSE SERPL-MCNC: 97 MG/DL — SIGNIFICANT CHANGE UP (ref 70–99)
HCT VFR BLD CALC: 26.6 % — LOW (ref 34.5–45)
HGB BLD-MCNC: 7.9 G/DL — LOW (ref 11.5–15.5)
IMM GRANULOCYTES NFR BLD AUTO: 0.3 % — SIGNIFICANT CHANGE UP (ref 0–0.9)
LYMPHOCYTES # BLD AUTO: 1.23 K/UL — SIGNIFICANT CHANGE UP (ref 1–3.3)
LYMPHOCYTES # BLD AUTO: 18.1 % — SIGNIFICANT CHANGE UP (ref 13–44)
MCHC RBC-ENTMCNC: 26 PG — LOW (ref 27–34)
MCHC RBC-ENTMCNC: 29.7 G/DL — LOW (ref 32–36)
MCV RBC AUTO: 87.5 FL — SIGNIFICANT CHANGE UP (ref 80–100)
MONOCYTES # BLD AUTO: 0.59 K/UL — SIGNIFICANT CHANGE UP (ref 0–0.9)
MONOCYTES NFR BLD AUTO: 8.7 % — SIGNIFICANT CHANGE UP (ref 2–14)
NEUTROPHILS # BLD AUTO: 4.43 K/UL — SIGNIFICANT CHANGE UP (ref 1.8–7.4)
NEUTROPHILS NFR BLD AUTO: 65.4 % — SIGNIFICANT CHANGE UP (ref 43–77)
PLATELET # BLD AUTO: 383 K/UL — SIGNIFICANT CHANGE UP (ref 150–400)
POTASSIUM SERPL-MCNC: 4.1 MMOL/L — SIGNIFICANT CHANGE UP (ref 3.5–5.3)
POTASSIUM SERPL-SCNC: 4.1 MMOL/L — SIGNIFICANT CHANGE UP (ref 3.5–5.3)
RBC # BLD: 3.04 M/UL — LOW (ref 3.8–5.2)
RBC # FLD: 17.1 % — HIGH (ref 10.3–14.5)
SODIUM SERPL-SCNC: 136 MMOL/L — SIGNIFICANT CHANGE UP (ref 135–145)
WBC # BLD: 6.78 K/UL — SIGNIFICANT CHANGE UP (ref 3.8–10.5)
WBC # FLD AUTO: 6.78 K/UL — SIGNIFICANT CHANGE UP (ref 3.8–10.5)

## 2024-12-13 PROCEDURE — 99232 SBSQ HOSP IP/OBS MODERATE 35: CPT

## 2024-12-13 PROCEDURE — 99233 SBSQ HOSP IP/OBS HIGH 50: CPT

## 2024-12-13 RX ORDER — ZOLPIDEM TARTRATE 10 MG/1
5 TABLET ORAL AT BEDTIME
Refills: 0 | Status: DISCONTINUED | OUTPATIENT
Start: 2024-12-13 | End: 2024-12-19

## 2024-12-13 RX ADMIN — MEROPENEM 1000 MILLIGRAM(S): 500 INJECTION, POWDER, FOR SOLUTION INTRAVENOUS at 05:26

## 2024-12-13 RX ADMIN — GABAPENTIN 800 MILLIGRAM(S): 300 CAPSULE ORAL at 21:47

## 2024-12-13 RX ADMIN — ENOXAPARIN SODIUM 100 MILLIGRAM(S): 30 INJECTION SUBCUTANEOUS at 21:47

## 2024-12-13 RX ADMIN — DILTIAZEM HYDROCHLORIDE 120 MILLIGRAM(S): 240 CAPSULE, COATED, EXTENDED RELEASE ORAL at 09:13

## 2024-12-13 RX ADMIN — GABAPENTIN 800 MILLIGRAM(S): 300 CAPSULE ORAL at 05:21

## 2024-12-13 RX ADMIN — Medication 10 MILLIGRAM(S): at 15:47

## 2024-12-13 RX ADMIN — Medication 25 MILLIGRAM(S): at 09:13

## 2024-12-13 RX ADMIN — FUROSEMIDE 40 MILLIGRAM(S): 40 TABLET ORAL at 09:14

## 2024-12-13 RX ADMIN — GABAPENTIN 800 MILLIGRAM(S): 300 CAPSULE ORAL at 13:21

## 2024-12-13 RX ADMIN — Medication 10 MILLIGRAM(S): at 21:47

## 2024-12-13 RX ADMIN — Medication 1000 UNIT(S): at 13:19

## 2024-12-13 RX ADMIN — CLOPIDOGREL 75 MILLIGRAM(S): 75 TABLET, FILM COATED ORAL at 13:19

## 2024-12-13 RX ADMIN — METOPROLOL TARTRATE 25 MILLIGRAM(S): 100 TABLET, FILM COATED ORAL at 05:22

## 2024-12-13 RX ADMIN — AMIODARONE HYDROCHLORIDE 200 MILLIGRAM(S): 200 TABLET ORAL at 05:21

## 2024-12-13 RX ADMIN — MONTELUKAST SODIUM 10 MILLIGRAM(S): 10 TABLET ORAL at 22:56

## 2024-12-13 RX ADMIN — Medication 1 TABLET(S): at 13:20

## 2024-12-13 RX ADMIN — ZOLPIDEM TARTRATE 5 MILLIGRAM(S): 10 TABLET ORAL at 23:27

## 2024-12-13 RX ADMIN — ENOXAPARIN SODIUM 100 MILLIGRAM(S): 30 INJECTION SUBCUTANEOUS at 09:13

## 2024-12-13 NOTE — PROGRESS NOTE ADULT - SUBJECTIVE AND OBJECTIVE BOX
74-year-old F, with PMH DM taken off metformin 1 month ago (after decreased HbA1C) ,  TAVR 8/2024 on Plavix followed by a two month rehab stay, A-fib on Xarelto, CHF, HLD, PVD, lower extremity elephantiasis  with varicose veins, who presented to [place] for SOB and lower extremity swelling that has been present for few weeks worsening this past week. Pt states she has orthopnea and decreased ambulation due to shortness of breath. PT denies any chest pain, fevers, chills, nightsweats. In the ED VSS. Propbp elevated trop negative ekg sinus ankush. CXR showed b/l pleural effusions admitted for CHF exacerbation.       Subjective: Patient seen and examined. Mildly more nausea again today, Etiology, opioids vs abx.  Continue to have leg pain will repeat US Venous and obtain  Arterial dopplers of lower extremities. Denies chest pain or worsening shortness of breath. Continue IV abx for UTI.  CXR without consolidation      Additional results/Imaging, I have personally reviewed:All other systems reviewed and found to be negative with the exception of what has been described above    .PHYSICAL EXAM:  Gen: No acute distress   HEENT:  Head is normocephalic    Skin:  Warm and dry without any rash   NECK:  Supple without lymphadenopathy.   HEART:  Regular rate and rhythm. normal S1 and S2, No M/R/G  LUNGS:  dec breath sounds   ABDOMEN:  Soft, nontender, nondistended with good bowel sounds heard  EXTREMITIES:  b/l pitting edema,   NEUROLOGICAL:  Gross nonfocal      labs reviewed

## 2024-12-13 NOTE — PROGRESS NOTE ADULT - ASSESSMENT
74-year-old F, with PMH DM taken off metformin 1 month ago (after decreased HbA1C), TAVR 8/2024 on Plavix followed by a two month rehab stay, A-fib on Xarelto, CHF, HLD, PVD, lower extremity elephantiasis with varicose veins, who is admitted with CHF exacerbation. Vascular consulted for management of bilateral CFA stenoses as on arterial duplex. Femoral pulses asymmetric, no pedal pulses but doppler signals of DP/PT bilaterally present.  CTA aorta reviewed    Recommendations:  -CFA endarterectomy pending date; likely some time next week   -Pain control PRN  -Rest of care per primary team   -local wound care     Plan will be discussed with Vascular surgery attending, Dr. Manjarrez     74-year-old F, with PMH DM taken off metformin 1 month ago (after decreased HbA1C), TAVR 8/2024 on Plavix followed by a two month rehab stay, A-fib on Xarelto, CHF, HLD, PVD, lower extremity elephantiasis with varicose veins, who is admitted with CHF exacerbation. Vascular consulted for management of bilateral CFA stenoses as on arterial duplex. Femoral pulses asymmetric, no pedal pulses but doppler signals of DP/PT bilaterally present.  CTA aorta reviewed    Recommendations:  -Right CFA endarterectomy, aiming for Tuesday, 12/17  -Please assist with medical and cardiac clearance for this  -Continue holding Xarelto & using short-acting AC  -Pain control PRN  -Rest of care per primary team   -local wound care     Discussed with Vascular surgery attending, Dr. Manjarrez

## 2024-12-13 NOTE — PROGRESS NOTE ADULT - ASSESSMENT
A: 73 y/o Female seen for the following:   -Rt heel partial thickness pressure wound, stable without acute SOI  -Lt posterior lower leg partial thickness venous stasis wound, stable without acute SOI  -BLE venous stasis    P:   Chart reviewed and Patient evaluated  CT scan reviewed: showing diffuse circumferential skin thickening, subcutaneous edema, and stranding which may be seen with cellulitis or edema within fluid overload. No organized collection or tract gas. No evidence of osteomyelitis.  Wound to Rt heel is partial thickness, granular and surrounding predominant hyperkeratotic tissue, improving size dimensions since last admission, no active discharge, no crepitus, no erythema, no malodor, no PTB, stable without gross SOI.   Wound to the posterior Left lower mid leg, with some serous drainage, no malodor, superficial in nature, stable without SOI.   WC: betadine with dry sterile dressing to Rt heel. Xeroform and Compressive dressings applied to LLE  Vascular reccs appreciated-->CFA endarterectomy pending date; likely some time next week   WBAT to BLE  Offload bilateral heels with two pillows under bilateral calfs or total CAIR boots to prevent further soft tissue damage as pt is has been predominately bedbound or sitting on a chair while placing pressure to heels.    No acute podiatric intervention warranted at this time. Continue with local wound care.   Pt to follow up at [facility] with Dr. Stovall for continued wound care treatment.   All additional care per Med appreciated  Patient demonstrated verbal understanding of all interventions and tolerated interventions well without any complications.       Case D/W with attending Dr. Stovall

## 2024-12-13 NOTE — PROGRESS NOTE ADULT - SUBJECTIVE AND OBJECTIVE BOX
Patient seen and examined at the bedside this AM.  Patient feels that even though there are significant co-morbidities she still wants to proceed with surgery  AVSS        PAST MEDICAL & SURGICAL HISTORY:  Diabetes mellitus type II, controlled      Atrial fibrillation      Congestive heart failure      Dyspnea      Morbid obesity with BMI of 40.0-44.9, adult      Neuropathy      Peripheral venous insufficiency      Thyroid nodule      HTN (hypertension)      Cellulitis      At risk for sleep apnea      History of esophagogastroduodenoscopy (EGD)      H/O colonoscopy      Other complications of gastric band procedure      S/P left knee arthroscopy      Status post medial meniscus repair      History of cholecystectomy      S/P cataract surgery          MEDICATIONS  (STANDING):  aMIOdarone    Tablet 200 milliGRAM(s) Oral daily  atorvastatin 10 milliGRAM(s) Oral at bedtime  cholecalciferol 1000 Unit(s) Oral daily  clopidogrel Tablet 75 milliGRAM(s) Oral daily  diltiazem    milliGRAM(s) Oral daily  enoxaparin Injectable 100 milliGRAM(s) SubCutaneous every 12 hours  ezetimibe 10 milliGRAM(s) Oral daily  furosemide    Tablet 40 milliGRAM(s) Oral daily  gabapentin 800 milliGRAM(s) Oral three times a day  HYDROmorphone  Injectable 0.5 milliGRAM(s) IV Push once  influenza  Vaccine (HIGH DOSE) 0.5 milliLiter(s) IntraMuscular once  lactobacillus acidophilus 1 Tablet(s) Oral daily  metoprolol succinate ER 25 milliGRAM(s) Oral daily  montelukast 10 milliGRAM(s) Oral at bedtime  spironolactone 25 milliGRAM(s) Oral daily    MEDICATIONS  (PRN):  acetaminophen     Tablet .. 650 milliGRAM(s) Oral every 6 hours PRN Temp greater or equal to 38C (100.4F), Mild Pain (1 - 3)  aluminum hydroxide/magnesium hydroxide/simethicone Suspension 30 milliLiter(s) Oral every 4 hours PRN Dyspepsia  cyclobenzaprine 10 milliGRAM(s) Oral three times a day PRN Muscle Spasm  melatonin 3 milliGRAM(s) Oral at bedtime PRN Insomnia  ondansetron Injectable 4 milliGRAM(s) IV Push every 8 hours PRN Nausea and/or Vomiting  oxycodone    5 mG/acetaminophen 325 mG 1 Tablet(s) Oral every 6 hours PRN Severe Pain (7 - 10)      Allergies    latex (Unknown)  [This allergen will not trigger allergy alert] IV DYE, IODINE CONTRAST (Unknown)  PC Pen VK (Rash)  penicillin (Hives; Urticaria)    Intolerances        SOCIAL HISTORY:    FAMILY HISTORY:  Family history of breast cancer in mother    Family history of diabetes mellitus in mother    FHx: heart disease (Sibling)          Physical Exam:  GENERAL: NAD, well developed, obese  HEAD: Atraumatic, normocephalic  EYES: EOMI, PERRLA, conjunctiva and sclera clear  ENT: moist mucous membrane  NECK: supple, No JVD, midline trachea  CHEST/LUNG: No increased WOB, symmetric excursions  Heart: RRR ppp, no peripheral edema  ABDOMEN: round, soft, nondistended, nontender. no organomegaly  EXTREMITIES: +2 left fem, 1+ right fem pulses, doppler signals of b/l DP biphasic and b/l PT monophasic (weaker on right), dressing of right heel & left calf present  NERVOUS SYSTEM: AOx4, speech clear, no neuro-deficits  MSK: full ROM, no deformities  SKIN: warm to touch, no rash or lesions        Vital Signs Last 24 Hrs  T(C): 36.8 (13 Dec 2024 00:10), Max: 36.8 (13 Dec 2024 00:10)  T(F): 98.2 (13 Dec 2024 00:10), Max: 98.2 (13 Dec 2024 00:10)  HR: 60 (13 Dec 2024 05:28) (52 - 60)  BP: 112/50 (13 Dec 2024 05:28) (112/50 - 163/81)  BP(mean): --  RR: 18 (13 Dec 2024 00:10) (14 - 18)  SpO2: 96% (13 Dec 2024 00:10) (93% - 97%)    Parameters below as of 13 Dec 2024 00:10  Patient On (Oxygen Delivery Method): room air        I&O's Summary    12 Dec 2024 07:01  -  13 Dec 2024 07:00  --------------------------------------------------------  IN: 0 mL / OUT: 400 mL / NET: -400 mL            LABS:                        7.9    6.78  )-----------( 383      ( 13 Dec 2024 07:08 )             26.6     12-13    136  |  101  |  21  ----------------------------<  97  4.1   |  31  |  0.84    Ca    8.7      13 Dec 2024 07:08  Phos  3.9     12-12  Mg     2.1     12-12      PT/INR - ( 12 Dec 2024 05:47 )   PT: 14.0 sec;   INR: 1.19 ratio           Urinalysis Basic - ( 13 Dec 2024 07:08 )    Color: x / Appearance: x / SG: x / pH: x  Gluc: 97 mg/dL / Ketone: x  / Bili: x / Urobili: x   Blood: x / Protein: x / Nitrite: x   Leuk Esterase: x / RBC: x / WBC x   Sq Epi: x / Non Sq Epi: x / Bacteria: x      CAPILLARY BLOOD GLUCOSE            Cultures:      RADIOLOGY & ADDITIONAL STUDIES:

## 2024-12-13 NOTE — PROGRESS NOTE ADULT - SUBJECTIVE AND OBJECTIVE BOX
12/13/24: Pt seen by podiatry team for day wounds. Pt resting bedside, NAD. no other pedal complaints.     PMH: Diabetes mellitus type II, controlled    Atrial fibrillation    Congestive heart failure    Dyspnea    Morbid obesity with BMI of 40.0-44.9, adult    Neuropathy    Peripheral venous insufficiency    Thyroid nodule    HTN (hypertension)    Cellulitis    At risk for sleep apnea      PSH:History of esophagogastroduodenoscopy (EGD)    H/O colonoscopy    Other complications of gastric band procedure    S/P left knee arthroscopy    Status post medial meniscus repair    History of cholecystectomy    S/P cataract surgery        Allergies:latex (Unknown)  [This allergen will not trigger allergy alert] IV DYE, IODINE CONTRAST (Unknown)  PC Pen VK (Rash)  penicillin (Hives; Urticaria)      Labs:                        7.9    6.78  )-----------( 383      ( 13 Dec 2024 07:08 )             26.6   12-13    136  |  101  |  21  ----------------------------<  97  4.1   |  31  |  0.84    Ca    8.7      13 Dec 2024 07:08  Phos  3.9     12-12  Mg     2.1     12-12        REVIEW OF SYSTEMS:    CONSTITUTIONAL: No weakness, fevers or chills  EYES: No visual changes  RESPIRATORY: No cough, wheezing; No shortness of breath  CARDIOVASCULAR: No chest pain or palpitations  GASTROINTESTINAL: No abdominal or epigastric pain. No nausea, vomiting; No diarrhea or constipation.   GENITOURINARY: No dysuria, frequency or hematuria  NEUROLOGICAL: No numbness or weakness  SKIN: See physical examination.  All other review of systems is negative unless indicated above    Physical Exam:   Constitutional: NAD, alert;  Lower Extremity Focus  Derm:  Skin warm, dry and supple bilateral.   Ulceration Right heel wound, partial thickness in nature, wound base granular wound size (approx 1.5 cm X 0.5cm X 0.1cm) and surrounding predominant hyperkeratotic tissue, no stephen-wound erythema/edema, no pus/purulence, no crepitus/fluctuance, no tracking/tunneling, no probe to bone.  Partial thickness ulceration noted to posterior medial Left lower midleg, some serous drainage, superficial wound bed, no malodor,  no crepitus/fluctuance, no tracking/tunneling, no PTB.   Vascular: Dorsalis Pedis and Posterior Tibial pulses weakly palpable  Neuro: Protective sensation intact to the level of the digits bilateral.  MSK: Muscle strength 5/5 all major muscle groups bilateral. Pt is minimally ambulatory       < from: CT Lower Extremity No Cont, Bilateral (12.05.24 @ 12:06) >    ACC: [id number] EXAM:  CT LWR EXT BI   ORDERED BY: LIBIA ALVARES     PROCEDURE DATE:  12/05/2024          INTERPRETATION:  CT OF THE bilateral lower extremities WITHOUT CONTRAST.    CLINICAL INFORMATION: Pain  TECHNIQUE: Axial CT images were obtained of the bilateral lower   extremities with coronal and sagittal reconstructions. No contrast was   administered. Three dimensional reconstructions were obtained.    COMPARISON: CT of the abdomen and pelvis performed 11/18/2024.    FINDINGS:    Bones: There is no acute fracture or dislocation. 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 The pubic symphysis is preserved. There is   moderate bilateral hip osteoarthrosis. Severe tricompartmental knee   osteoarthrosis with bone-on-bone apposition atthe medial compartments   bilaterally. Ring and arc type lesion within the distal tibia felt to be   consistent with benign chondroid lesion.    Soft tissues: Fat-containing umbilical hernia is present. Colonic   diverticula without evidence of diverticulitis. Atherosclerotic vascular   calcifications are present. Diffuse subcutaneous edema and stranding   throughout bilateral lower extremities with circumferential skin   thickening. There is no organized collection. There is no tracking gas.    IMPRESSION:    CT of bilateral lower extremities demonstrates diffuse circumferential   skin thickening, subcutaneous edema, and stranding which may be seen with   cellulitis or edema within fluid overload. No organized collection or   tract gas. No evidence of osteomyelitis.        --- End of Report ---    < end of copied text >

## 2024-12-13 NOTE — PROGRESS NOTE ADULT - ASSESSMENT
74-year-old F, with PMH DM taken off metformin 1 month ago (after decreased HbA1C) ,  TAVR 8/2024 on Plavix followed by a two month rehab stay, A-fib on Xarelto, CHF, HLD, PVD, lower extremity elephantiasis  with varicose veins, who presented to [place] for SOB and lower extremity swelling that has been present for few weeks worsening this past week.  CXR showed b/l pleural effusions admitted for CHF exacerbation.       # HFpEF exacerbation 50-55%  - daily weights, I&O  - TTE - 1. Left ventricular cavity is moderately dilated. Left ventricular wall thickness is normal. Left ventricular systolic function is normal with an ejection fraction of 56 % by Ramirez's method of disks.   2. There is severe (grade 3) left ventricular diastolic dysfunction.  - Continue Lasix 40 IV daily   switched to lasix po  - Cardiology consulted recommendations appreciated   Continue farxiga, restart spironolactone     # B/l  common femoral artery stenosis  angiogram B/l LE by vascular   and for vascular intervention stent vs bypass  pre op eval  no acute medical contraindications for OR by vascular for CFA enarderectomy no sepsis , ESBL UTI on meropenem afebrile, hemodynamically stable , cardio stable Fever resolved /UTI  esbl ecoli  started meropenem    B/l femoral arty stenosis  vascular w/u underway    Hypokalemia  Oral replacement and restart spironolactone   dispo- d  plan tmrw    # Mild hypervolemic hyponatremia should improve with lasix   improved     # Afib on xeralto s/p ablation

## 2024-12-14 LAB
ANION GAP SERPL CALC-SCNC: 2 MMOL/L — LOW (ref 5–17)
BASOPHILS # BLD AUTO: 0.07 K/UL — SIGNIFICANT CHANGE UP (ref 0–0.2)
BASOPHILS NFR BLD AUTO: 0.9 % — SIGNIFICANT CHANGE UP (ref 0–2)
BUN SERPL-MCNC: 20 MG/DL — SIGNIFICANT CHANGE UP (ref 7–23)
CALCIUM SERPL-MCNC: 8.9 MG/DL — SIGNIFICANT CHANGE UP (ref 8.5–10.1)
CHLORIDE SERPL-SCNC: 100 MMOL/L — SIGNIFICANT CHANGE UP (ref 96–108)
CO2 SERPL-SCNC: 29 MMOL/L — SIGNIFICANT CHANGE UP (ref 22–31)
CREAT SERPL-MCNC: 0.7 MG/DL — SIGNIFICANT CHANGE UP (ref 0.5–1.3)
EGFR: 90 ML/MIN/1.73M2 — SIGNIFICANT CHANGE UP
EOSINOPHIL # BLD AUTO: 0.42 K/UL — SIGNIFICANT CHANGE UP (ref 0–0.5)
EOSINOPHIL NFR BLD AUTO: 5.5 % — SIGNIFICANT CHANGE UP (ref 0–6)
GLUCOSE SERPL-MCNC: 88 MG/DL — SIGNIFICANT CHANGE UP (ref 70–99)
HCT VFR BLD CALC: 27.9 % — LOW (ref 34.5–45)
HGB BLD-MCNC: 8.1 G/DL — LOW (ref 11.5–15.5)
IMM GRANULOCYTES NFR BLD AUTO: 0.4 % — SIGNIFICANT CHANGE UP (ref 0–0.9)
LYMPHOCYTES # BLD AUTO: 1.82 K/UL — SIGNIFICANT CHANGE UP (ref 1–3.3)
LYMPHOCYTES # BLD AUTO: 24 % — SIGNIFICANT CHANGE UP (ref 13–44)
MCHC RBC-ENTMCNC: 25.3 PG — LOW (ref 27–34)
MCHC RBC-ENTMCNC: 29 G/DL — LOW (ref 32–36)
MCV RBC AUTO: 87.2 FL — SIGNIFICANT CHANGE UP (ref 80–100)
MONOCYTES # BLD AUTO: 0.75 K/UL — SIGNIFICANT CHANGE UP (ref 0–0.9)
MONOCYTES NFR BLD AUTO: 9.9 % — SIGNIFICANT CHANGE UP (ref 2–14)
NEUTROPHILS # BLD AUTO: 4.5 K/UL — SIGNIFICANT CHANGE UP (ref 1.8–7.4)
NEUTROPHILS NFR BLD AUTO: 59.3 % — SIGNIFICANT CHANGE UP (ref 43–77)
PLATELET # BLD AUTO: 434 K/UL — HIGH (ref 150–400)
POTASSIUM SERPL-MCNC: 4.2 MMOL/L — SIGNIFICANT CHANGE UP (ref 3.5–5.3)
POTASSIUM SERPL-SCNC: 4.2 MMOL/L — SIGNIFICANT CHANGE UP (ref 3.5–5.3)
RBC # BLD: 3.2 M/UL — LOW (ref 3.8–5.2)
RBC # FLD: 16.9 % — HIGH (ref 10.3–14.5)
SODIUM SERPL-SCNC: 131 MMOL/L — LOW (ref 135–145)
WBC # BLD: 7.59 K/UL — SIGNIFICANT CHANGE UP (ref 3.8–10.5)
WBC # FLD AUTO: 7.59 K/UL — SIGNIFICANT CHANGE UP (ref 3.8–10.5)

## 2024-12-14 PROCEDURE — 99232 SBSQ HOSP IP/OBS MODERATE 35: CPT

## 2024-12-14 RX ADMIN — ENOXAPARIN SODIUM 100 MILLIGRAM(S): 30 INJECTION SUBCUTANEOUS at 21:52

## 2024-12-14 RX ADMIN — CLOPIDOGREL 75 MILLIGRAM(S): 75 TABLET, FILM COATED ORAL at 08:49

## 2024-12-14 RX ADMIN — Medication 1000 UNIT(S): at 08:49

## 2024-12-14 RX ADMIN — DILTIAZEM HYDROCHLORIDE 120 MILLIGRAM(S): 240 CAPSULE, COATED, EXTENDED RELEASE ORAL at 08:49

## 2024-12-14 RX ADMIN — Medication 10 MILLIGRAM(S): at 21:15

## 2024-12-14 RX ADMIN — AMIODARONE HYDROCHLORIDE 200 MILLIGRAM(S): 200 TABLET ORAL at 05:24

## 2024-12-14 RX ADMIN — ENOXAPARIN SODIUM 100 MILLIGRAM(S): 30 INJECTION SUBCUTANEOUS at 08:50

## 2024-12-14 RX ADMIN — Medication 25 MILLIGRAM(S): at 08:49

## 2024-12-14 RX ADMIN — GABAPENTIN 800 MILLIGRAM(S): 300 CAPSULE ORAL at 15:57

## 2024-12-14 RX ADMIN — GABAPENTIN 800 MILLIGRAM(S): 300 CAPSULE ORAL at 05:23

## 2024-12-14 RX ADMIN — MONTELUKAST SODIUM 10 MILLIGRAM(S): 10 TABLET ORAL at 21:15

## 2024-12-14 RX ADMIN — ZOLPIDEM TARTRATE 5 MILLIGRAM(S): 10 TABLET ORAL at 23:10

## 2024-12-14 RX ADMIN — Medication 1 TABLET(S): at 08:50

## 2024-12-14 RX ADMIN — GABAPENTIN 800 MILLIGRAM(S): 300 CAPSULE ORAL at 21:15

## 2024-12-14 RX ADMIN — Medication 10 MILLIGRAM(S): at 08:50

## 2024-12-14 RX ADMIN — FUROSEMIDE 40 MILLIGRAM(S): 40 TABLET ORAL at 08:49

## 2024-12-14 NOTE — PROGRESS NOTE ADULT - SUBJECTIVE AND OBJECTIVE BOX
74-year-old F, with PMH DM taken off metformin 1 month ago (after decreased HbA1C) ,  TAVR 2024 on Plavix followed by a two month rehab stay, A-fib on Xarelto, CHF, HLD, PVD, lower extremity elephantiasis  with varicose veins, who presented to [place] for SOB and lower extremity swelling that has been present for few weeks worsening this past week. Pt states she has orthopnea and decreased ambulation due to shortness of breath. PT denies any chest pain, fevers, chills, nightsweats. In the ED VSS. Propbp elevated trop negative ekg sinus ankush. CXR showed b/l pleural effusions admitted for CHF exacerbation.       Subjective: Patient seen and examined. denies cp, no sob, wants surgery to be done dureing this admission  Continue to have leg pain will repeat US Venous and obtain  Arterial dopplers of lower extremities. Denies chest pain or worsening shortness of breath. Continue IV abx for UTI.  CXR without consolidation      Additional results/Imaging, I have personally reviewed:All other systems reviewed and found to be negative with the exception of what has been described above    .PHYSICAL EXAM:  Gen: No acute distress   HEENT:  Head is normocephalic    Skin:  Warm and dry without any rash   NECK:  Supple without lymphadenopathy.   HEART:  Regular rate and rhythm. normal S1 and S2, No M/R/G  LUNGS:  dec breath sounds   ABDOMEN:  Soft, nontender, nondistended with good bowel sounds heard  EXTREMITIES:  b/l pitting edema,   NEUROLOGICAL:  Gross nonfocal      labs reviewed    PHYSICAL EXAM:    Daily     Daily Weight in k.8 (14 Dec 2024 06:29)    ICU Vital Signs Last 24 Hrs  T(C): 36.5 (14 Dec 2024 08:40), Max: 36.7 (14 Dec 2024 00:00)  T(F): 97.7 (14 Dec 2024 08:40), Max: 98 (14 Dec 2024 00:00)  HR: 51 (14 Dec 2024 08:40) (51 - 60)  BP: 133/44 (14 Dec 2024 08:40) (122/56 - 133/44)  BP(mean): --  ABP: --  ABP(mean): --  RR: 18 (14 Dec 2024 08:40) (18 - 18)  SpO2: 100% (14 Dec 2024 08:40) (96% - 100%)    O2 Parameters below as of 14 Dec 2024 08:40  Patient On (Oxygen Delivery Method): room air                              8.1    7.59  )-----------( 434      ( 14 Dec 2024 07:11 )             27.9       CBC Full  -  ( 14 Dec 2024 07:11 )  WBC Count : 7.59 K/uL  RBC Count : 3.20 M/uL  Hemoglobin : 8.1 g/dL  Hematocrit : 27.9 %  Platelet Count - Automated : 434 K/uL  Mean Cell Volume : 87.2 fl  Mean Cell Hemoglobin : 25.3 pg  Mean Cell Hemoglobin Concentration : 29.0 g/dL  Auto Neutrophil # : 4.50 K/uL  Auto Lymphocyte # : 1.82 K/uL  Auto Monocyte # : 0.75 K/uL  Auto Eosinophil # : 0.42 K/uL  Auto Basophil # : 0.07 K/uL  Auto Neutrophil % : 59.3 %  Auto Lymphocyte % : 24.0 %  Auto Monocyte % : 9.9 %  Auto Eosinophil % : 5.5 %  Auto Basophil % : 0.9 %          131[L]  |  100  |  20  ----------------------------<  88  4.2   |  29  |  0.70    Ca    8.9      14 Dec 2024 07:11                      Urinalysis Basic - ( 14 Dec 2024 07:11 )    Color: x / Appearance: x / SG: x / pH: x  Gluc: 88 mg/dL / Ketone: x  / Bili: x / Urobili: x   Blood: x / Protein: x / Nitrite: x   Leuk Esterase: x / RBC: x / WBC x   Sq Epi: x / Non Sq Epi: x / Bacteria: x            MEDICATIONS  (STANDING):  aMIOdarone    Tablet 200 milliGRAM(s) Oral daily  atorvastatin 10 milliGRAM(s) Oral at bedtime  cholecalciferol 1000 Unit(s) Oral daily  clopidogrel Tablet 75 milliGRAM(s) Oral daily  diltiazem    milliGRAM(s) Oral daily  enoxaparin Injectable 100 milliGRAM(s) SubCutaneous every 12 hours  ezetimibe 10 milliGRAM(s) Oral daily  furosemide    Tablet 40 milliGRAM(s) Oral daily  gabapentin 800 milliGRAM(s) Oral three times a day  HYDROmorphone  Injectable 0.5 milliGRAM(s) IV Push once  influenza  Vaccine (HIGH DOSE) 0.5 milliLiter(s) IntraMuscular once  lactobacillus acidophilus 1 Tablet(s) Oral daily  metoprolol succinate ER 25 milliGRAM(s) Oral daily  montelukast 10 milliGRAM(s) Oral at bedtime  spironolactone 25 milliGRAM(s) Oral daily

## 2024-12-14 NOTE — PROGRESS NOTE ADULT - ASSESSMENT
74-year-old F, with PMH DM taken off metformin 1 month ago (after decreased HbA1C), TAVR 8/2024 on Plavix followed by a two month rehab stay, A-fib on Xarelto, CHF, HLD, PVD, lower extremity elephantiasis with varicose veins, who is admitted with CHF exacerbation. Vascular consulted for management of bilateral CFA stenoses as on arterial duplex. Femoral pulses asymmetric, no pedal pulses but doppler signals of DP/PT bilaterally present.  CTA aorta reviewed    Recommendations:  -Right CFA endarterectomy, aiming for Tuesday, 12/17  -Please assist with medical and cardiac clearance for this    -Per discussion with Leyla, he has cleared patient for her prior angiogram and the endarterectomy planned  -Continue holding Xarelto & using short-acting AC  -Pain control PRN  -Rest of care per primary team   -local wound care     Will d/w Vascular team & update accordingly

## 2024-12-14 NOTE — PROGRESS NOTE ADULT - SUBJECTIVE AND OBJECTIVE BOX
SURGERY DAILY PROGRESS NOTE:     Subjective:  Patient seen and examined this AM at bedside. No acute events overnight and patient resting comfortably. Patient desires surgery to assist healing her right heel wound. Denies fever/chills, shortness of breath, chest pain. VS reviewed    Objective:    MEDICATIONS  (STANDING):  aMIOdarone    Tablet 200 milliGRAM(s) Oral daily  atorvastatin 10 milliGRAM(s) Oral at bedtime  cholecalciferol 1000 Unit(s) Oral daily  clopidogrel Tablet 75 milliGRAM(s) Oral daily  diltiazem    milliGRAM(s) Oral daily  enoxaparin Injectable 100 milliGRAM(s) SubCutaneous every 12 hours  ezetimibe 10 milliGRAM(s) Oral daily  furosemide    Tablet 40 milliGRAM(s) Oral daily  gabapentin 800 milliGRAM(s) Oral three times a day  HYDROmorphone  Injectable 0.5 milliGRAM(s) IV Push once  influenza  Vaccine (HIGH DOSE) 0.5 milliLiter(s) IntraMuscular once  lactobacillus acidophilus 1 Tablet(s) Oral daily  metoprolol succinate ER 25 milliGRAM(s) Oral daily  montelukast 10 milliGRAM(s) Oral at bedtime  spironolactone 25 milliGRAM(s) Oral daily    MEDICATIONS  (PRN):  acetaminophen     Tablet .. 650 milliGRAM(s) Oral every 6 hours PRN Temp greater or equal to 38C (100.4F), Mild Pain (1 - 3)  aluminum hydroxide/magnesium hydroxide/simethicone Suspension 30 milliLiter(s) Oral every 4 hours PRN Dyspepsia  cyclobenzaprine 10 milliGRAM(s) Oral three times a day PRN Muscle Spasm  melatonin 3 milliGRAM(s) Oral at bedtime PRN Insomnia  ondansetron Injectable 4 milliGRAM(s) IV Push every 8 hours PRN Nausea and/or Vomiting  oxycodone    5 mG/acetaminophen 325 mG 1 Tablet(s) Oral every 6 hours PRN Severe Pain (7 - 10)  zolpidem 5 milliGRAM(s) Oral at bedtime PRN Insomnia      Vital Signs Last 24 Hrs  T(C): 36.4 (14 Dec 2024 05:00), Max: 36.8 (13 Dec 2024 09:01)  T(F): 97.5 (14 Dec 2024 05:00), Max: 98.2 (13 Dec 2024 09:01)  HR: 60 (14 Dec 2024 05:00) (55 - 71)  BP: 122/56 (14 Dec 2024 05:00) (109/54 - 126/54)  BP(mean): --  RR: 18 (14 Dec 2024 05:00) (18 - 18)  SpO2: 96% (14 Dec 2024 05:00) (96% - 100%)    Parameters below as of 14 Dec 2024 05:00  Patient On (Oxygen Delivery Method): room air          PHYSICAL EXAM   GENERAL: NAD, well developed, obese  HEAD: Atraumatic, normocephalic  EYES: EOMI, PERRLA, conjunctiva and sclera clear  ENT: moist mucous membrane  NECK: supple, No JVD, midline trachea  CHEST/LUNG: No increased WOB, symmetric excursions  Heart: RRR ppp, no peripheral edema  ABDOMEN: round, soft, nondistended, nontender. right groin dressing c/d/i & w/o ecchymosis/hematoma  EXTREMITIES: +2 left fem, 1+ right fem pulses, doppler signals of b/l DP biphasic and b/l PT monophasic (weaker on right), dressing of right heel & left calf present  NERVOUS SYSTEM: AOx4, speech clear, no neuro-deficits  MSK: full ROM, no deformities  SKIN: warm to touch, no rash or lesions      I&O's Detail      Daily     Daily Weight in k.8 (14 Dec 2024 06:29)    LABS:                        8.1    7.59  )-----------( 434      ( 14 Dec 2024 07:11 )             27.9     12-14    131[L]  |  100  |  20  ----------------------------<  88  4.2   |  29  |  0.70    Ca    8.9      14 Dec 2024 07:11        Urinalysis Basic - ( 14 Dec 2024 07:11 )    Color: x / Appearance: x / SG: x / pH: x  Gluc: 88 mg/dL / Ketone: x  / Bili: x / Urobili: x   Blood: x / Protein: x / Nitrite: x   Leuk Esterase: x / RBC: x / WBC x   Sq Epi: x / Non Sq Epi: x / Bacteria: x

## 2024-12-14 NOTE — PROGRESS NOTE ADULT - ASSESSMENT
74-year-old F, with PMH DM taken off metformin 1 month ago (after decreased HbA1C) ,  TAVR 8/2024 on Plavix followed by a two month rehab stay, A-fib on Xarelto, CHF, HLD, PVD, lower extremity elephantiasis  with varicose veins, who presented to [place] for SOB and lower extremity swelling that has been present for few weeks worsening this past week.  CXR showed b/l pleural effusions admitted for CHF exacerbation.       # HFpEF exacerbation 50-55%  - daily weights, I&O  - TTE - 1. Left ventricular cavity is moderately dilated. Left ventricular wall thickness is normal. Left ventricular systolic function is normal with an ejection fraction of 56 % by Ramirez's method of disks.   2. There is severe (grade 3) left ventricular diastolic dysfunction.  - Continue Lasix 40 IV daily   switched to lasix po  - Cardiology consulted recommendations appreciated   Continue farxiga, restart spironolactone     # B/l  common femoral artery stenosis  angiogram B/l LE by vascular   and for vascular intervention stent vs bypass  pre op eval  no acute medical contraindications for OR by vascular for CFA enarderectomy no sepsis , ESBL UTI on meropenem afebrile, hemodynamically stable , cardio stable Fever resolved /UTI  i reviewed cardiology note - cardiology cleared pt for CFAenarterectomy  esbl ecoli  started meropenem    B/l femoral arty stenosis  vascular w/u underway    Hypokalemia  Oral replacement and restart spironolactone   dispo- d  plan tmrw    # Mild hypervolemic hyponatremia should improve with lasix   improved     # Afib on xeralto s/p ablation

## 2024-12-15 LAB
ANION GAP SERPL CALC-SCNC: 2 MMOL/L — LOW (ref 5–17)
BUN SERPL-MCNC: 22 MG/DL — SIGNIFICANT CHANGE UP (ref 7–23)
CALCIUM SERPL-MCNC: 9.2 MG/DL — SIGNIFICANT CHANGE UP (ref 8.5–10.1)
CHLORIDE SERPL-SCNC: 103 MMOL/L — SIGNIFICANT CHANGE UP (ref 96–108)
CO2 SERPL-SCNC: 31 MMOL/L — SIGNIFICANT CHANGE UP (ref 22–31)
CREAT SERPL-MCNC: 0.74 MG/DL — SIGNIFICANT CHANGE UP (ref 0.5–1.3)
EGFR: 84 ML/MIN/1.73M2 — SIGNIFICANT CHANGE UP
GLUCOSE SERPL-MCNC: 96 MG/DL — SIGNIFICANT CHANGE UP (ref 70–99)
POTASSIUM SERPL-MCNC: 3.8 MMOL/L — SIGNIFICANT CHANGE UP (ref 3.5–5.3)
POTASSIUM SERPL-SCNC: 3.8 MMOL/L — SIGNIFICANT CHANGE UP (ref 3.5–5.3)
SODIUM SERPL-SCNC: 136 MMOL/L — SIGNIFICANT CHANGE UP (ref 135–145)

## 2024-12-15 PROCEDURE — 99232 SBSQ HOSP IP/OBS MODERATE 35: CPT

## 2024-12-15 RX ORDER — NALOXONE HCL 0.4 MG/ML
0.4 AMPUL (ML) INJECTION ONCE
Refills: 0 | Status: DISCONTINUED | OUTPATIENT
Start: 2024-12-15 | End: 2024-12-19

## 2024-12-15 RX ADMIN — GABAPENTIN 800 MILLIGRAM(S): 300 CAPSULE ORAL at 05:05

## 2024-12-15 RX ADMIN — GABAPENTIN 800 MILLIGRAM(S): 300 CAPSULE ORAL at 21:27

## 2024-12-15 RX ADMIN — GABAPENTIN 800 MILLIGRAM(S): 300 CAPSULE ORAL at 13:59

## 2024-12-15 RX ADMIN — Medication 1000 UNIT(S): at 12:05

## 2024-12-15 RX ADMIN — AMIODARONE HYDROCHLORIDE 200 MILLIGRAM(S): 200 TABLET ORAL at 08:40

## 2024-12-15 RX ADMIN — Medication 10 MILLIGRAM(S): at 21:27

## 2024-12-15 RX ADMIN — DILTIAZEM HYDROCHLORIDE 120 MILLIGRAM(S): 240 CAPSULE, COATED, EXTENDED RELEASE ORAL at 11:10

## 2024-12-15 RX ADMIN — Medication 1 TABLET(S): at 12:05

## 2024-12-15 RX ADMIN — MONTELUKAST SODIUM 10 MILLIGRAM(S): 10 TABLET ORAL at 21:27

## 2024-12-15 RX ADMIN — ENOXAPARIN SODIUM 100 MILLIGRAM(S): 30 INJECTION SUBCUTANEOUS at 21:26

## 2024-12-15 RX ADMIN — Medication 25 MILLIGRAM(S): at 11:12

## 2024-12-15 RX ADMIN — CLOPIDOGREL 75 MILLIGRAM(S): 75 TABLET, FILM COATED ORAL at 12:05

## 2024-12-15 RX ADMIN — ENOXAPARIN SODIUM 100 MILLIGRAM(S): 30 INJECTION SUBCUTANEOUS at 11:11

## 2024-12-15 RX ADMIN — ACETAMINOPHEN, DIPHENHYDRAMINE HCL, PHENYLEPHRINE HCL 3 MILLIGRAM(S): 325; 25; 5 TABLET ORAL at 21:27

## 2024-12-15 RX ADMIN — Medication 10 MILLIGRAM(S): at 12:05

## 2024-12-15 RX ADMIN — FUROSEMIDE 40 MILLIGRAM(S): 40 TABLET ORAL at 11:12

## 2024-12-15 RX ADMIN — ZOLPIDEM TARTRATE 5 MILLIGRAM(S): 10 TABLET ORAL at 23:09

## 2024-12-15 NOTE — PROGRESS NOTE ADULT - ASSESSMENT
74-year-old F, with PMH DM taken off metformin 1 month ago (after decreased HbA1C) ,  TAVR 8/2024 on Plavix followed by a two month rehab stay, A-fib on Xarelto, CHF, HLD, PVD, lower extremity elephantiasis  with varicose veins, who presented to [place] for SOB and lower extremity swelling that has been present for few weeks worsening this past week.  CXR showed b/l pleural effusions admitted for CHF exacerbation.       # HFpEF exacerbation 50-55%  - daily weights, I&O  - TTE - 1. Left ventricular cavity is moderately dilated. Left ventricular wall thickness is normal. Left ventricular systolic function is normal with an ejection fraction of 56 % by Ramirez's method of disks.   2. There is severe (grade 3) left ventricular diastolic dysfunction.  - Continue Lasix 40 IV daily   switched to lasix po  - Cardiology consulted recommendations appreciated   Continue farxiga, restart spironolactone     # B/l  common femoral artery stenosis  angiogram B/l LE by vascular   and for vascular intervention stent vs bypass  pre op eval  no acute medical contraindications for OR by vascular for CFA enarderectomy no sepsis , ESBL UTI on meropenem afebrile, hemodynamically stable , cardio stable Fever resolved /UTI  per vascular  cardiology cleared pt for CFAenarterectomy  esbl ecoli  started meropenem    B/l femoral arty stenosis  vascular w/u underway    Hypokalemia  Oral replacement and restart spironolactone   dispo- d  plan tmrw# Mild hypervolemic hyponatremia should improve with lasix   improved     # Afib on xeralto s/p ablation

## 2024-12-15 NOTE — PROGRESS NOTE ADULT - SUBJECTIVE AND OBJECTIVE BOX
SURGERY DAILY PROGRESS NOTE:     Subjective:  Patient seen and examined at bedside during am rounds. AVSS. Denies any fevers, chills, n/v/d, chest pain or shortness of breath    Objective:    MEDICATIONS  (STANDING):  aMIOdarone    Tablet 200 milliGRAM(s) Oral daily  atorvastatin 10 milliGRAM(s) Oral at bedtime  cholecalciferol 1000 Unit(s) Oral daily  clopidogrel Tablet 75 milliGRAM(s) Oral daily  diltiazem    milliGRAM(s) Oral daily  enoxaparin Injectable 100 milliGRAM(s) SubCutaneous every 12 hours  ezetimibe 10 milliGRAM(s) Oral daily  furosemide    Tablet 40 milliGRAM(s) Oral daily  gabapentin 800 milliGRAM(s) Oral three times a day  HYDROmorphone  Injectable 0.5 milliGRAM(s) IV Push once  influenza  Vaccine (HIGH DOSE) 0.5 milliLiter(s) IntraMuscular once  lactobacillus acidophilus 1 Tablet(s) Oral daily  metoprolol succinate ER 25 milliGRAM(s) Oral daily  montelukast 10 milliGRAM(s) Oral at bedtime  spironolactone 25 milliGRAM(s) Oral daily    MEDICATIONS  (PRN):  acetaminophen     Tablet .. 650 milliGRAM(s) Oral every 6 hours PRN Temp greater or equal to 38C (100.4F), Mild Pain (1 - 3)  aluminum hydroxide/magnesium hydroxide/simethicone Suspension 30 milliLiter(s) Oral every 4 hours PRN Dyspepsia  cyclobenzaprine 10 milliGRAM(s) Oral three times a day PRN Muscle Spasm  melatonin 3 milliGRAM(s) Oral at bedtime PRN Insomnia  ondansetron Injectable 4 milliGRAM(s) IV Push every 8 hours PRN Nausea and/or Vomiting  zolpidem 5 milliGRAM(s) Oral at bedtime PRN Insomnia      Vital Signs Last 24 Hrs  T(C): 36.7 (15 Dec 2024 00:30), Max: 36.7 (15 Dec 2024 00:30)  T(F): 98 (15 Dec 2024 00:30), Max: 98 (15 Dec 2024 00:30)  HR: 62 (15 Dec 2024 00:30) (51 - 62)  BP: 110/58 (15 Dec 2024 00:30) (110/58 - 133/44)  BP(mean): --  RR: 18 (15 Dec 2024 00:30) (18 - 19)  SpO2: 98% (15 Dec 2024 00:30) (98% - 100%)    Parameters below as of 15 Dec 2024 00:30  Patient On (Oxygen Delivery Method): room air        PHYSICAL EXAM   GENERAL: NAD, well developed, obese  HEAD: Atraumatic, normocephalic  EYES: EOMI, PERRLA, conjunctiva and sclera clear  ENT: moist mucous membrane  NECK: supple, No JVD, midline trachea  CHEST/LUNG: No increased WOB, symmetric excursions  Heart: RRR ppp, no peripheral edema  ABDOMEN: round, soft, nondistended, nontender. right groin dressing c/d/i & w/o ecchymosis/hematoma  EXTREMITIES: +2 left fem, 1+ right fem pulses, doppler signals of b/l DP biphasic and b/l PT monophasic (weaker on right), dressing of right heel & left calf present  NERVOUS SYSTEM: AOx4, speech clear, no neuro-deficits  MSK: full ROM, no deformities  SKIN: warm to touch, no rash or lesions      I&O's Detail    14 Dec 2024 07:01  -  15 Dec 2024 07:00  --------------------------------------------------------  IN:  Total IN: 0 mL    OUT:    Voided (mL): 1200 mL  Total OUT: 1200 mL    Total NET: -1200 mL          Daily     Daily Weight in k.9 (15 Dec 2024 06:12)    LABS:                        8.1    7.59  )-----------( 434      ( 14 Dec 2024 07:11 )             27.9     12-14    131[L]  |  100  |  20  ----------------------------<  88  4.2   |  29  |  0.70    Ca    8.9      14 Dec 2024 07:11        Urinalysis Basic - ( 14 Dec 2024 07:11 )    Color: x / Appearance: x / SG: x / pH: x  Gluc: 88 mg/dL / Ketone: x  / Bili: x / Urobili: x   Blood: x / Protein: x / Nitrite: x   Leuk Esterase: x / RBC: x / WBC x   Sq Epi: x / Non Sq Epi: x / Bacteria: x

## 2024-12-15 NOTE — PROGRESS NOTE ADULT - SUBJECTIVE AND OBJECTIVE BOX
74-year-old F, with PMH DM taken off metformin 1 month ago (after decreased HbA1C) ,  TAVR 8/2024 on Plavix followed by a two month rehab stay, A-fib on Xarelto, CHF, HLD, PVD, lower extremity elephantiasis  with varicose veins, who presented to [place] for SOB and lower extremity swelling that has been present for few weeks worsening this past week. Pt states she has orthopnea and decreased ambulation due to shortness of breath. PT denies any chest pain, fevers, chills, nightsweats. In the ED VSS. Propbp elevated trop negative ekg sinus ankush. CXR showed b/l pleural effusions admitted for CHF exacerbation.       Subjective: Patient seen and examined. denies cp, no sob, wants surgery to be done dureing this admission  Continue to have leg pain will repeat US Venous and obtain  Arterial dopplers of lower extremities. Denies chest pain or worsening shortness of breath. Continue IV abx for UTI.  CXR without consolidation      Additional results/Imaging, I have personally reviewed:All other systems reviewed and found to be negative with the exception of what has been described above    .PHYSICAL EXAM:  Gen: No acute distress   HEENT:  Head is normocephalic    Skin:  Warm and dry without any rash   NECK:  Supple without lymphadenopathy.   HEART:  Regular rate and rhythm. normal S1 and S2, No M/R/G  LUNGS:  dec breath sounds   ABDOMEN:  Soft, nontender, nondistended with good bowel sounds heard  EXTREMITIES:  b/l pitting edema,   NEUROLOGICAL:  Gross nonfocallabs reviewed    PHYSICAL EXAM:

## 2024-12-16 DIAGNOSIS — R26.2 DIFFICULTY IN WALKING, NOT ELSEWHERE CLASSIFIED: ICD-10-CM

## 2024-12-16 DIAGNOSIS — I50.32 CHRONIC DIASTOLIC (CONGESTIVE) HEART FAILURE: ICD-10-CM

## 2024-12-16 DIAGNOSIS — I89.0 LYMPHEDEMA, NOT ELSEWHERE CLASSIFIED: ICD-10-CM

## 2024-12-16 DIAGNOSIS — E11.49 TYPE 2 DIABETES MELLITUS WITH OTHER DIABETIC NEUROLOGICAL COMPLICATION: ICD-10-CM

## 2024-12-16 DIAGNOSIS — I11.0 HYPERTENSIVE HEART DISEASE WITH HEART FAILURE: ICD-10-CM

## 2024-12-16 DIAGNOSIS — I73.9 PERIPHERAL VASCULAR DISEASE, UNSPECIFIED: ICD-10-CM

## 2024-12-16 DIAGNOSIS — I87.2 VENOUS INSUFFICIENCY (CHRONIC) (PERIPHERAL): ICD-10-CM

## 2024-12-16 DIAGNOSIS — L97.512 NON-PRESSURE CHRONIC ULCER OF OTHER PART OF RIGHT FOOT WITH FAT LAYER EXPOSED: ICD-10-CM

## 2024-12-16 DIAGNOSIS — E11.621 TYPE 2 DIABETES MELLITUS WITH FOOT ULCER: ICD-10-CM

## 2024-12-16 DIAGNOSIS — L97.822 NON-PRESSURE CHRONIC ULCER OF OTHER PART OF LEFT LOWER LEG WITH FAT LAYER EXPOSED: ICD-10-CM

## 2024-12-16 DIAGNOSIS — M79.671 PAIN IN RIGHT FOOT: ICD-10-CM

## 2024-12-16 LAB
ANION GAP SERPL CALC-SCNC: 4 MMOL/L — LOW (ref 5–17)
BASOPHILS # BLD AUTO: 0.09 K/UL — SIGNIFICANT CHANGE UP (ref 0–0.2)
BASOPHILS NFR BLD AUTO: 1.3 % — SIGNIFICANT CHANGE UP (ref 0–2)
BUN SERPL-MCNC: 26 MG/DL — HIGH (ref 7–23)
CALCIUM SERPL-MCNC: 8.7 MG/DL — SIGNIFICANT CHANGE UP (ref 8.5–10.1)
CHLORIDE SERPL-SCNC: 100 MMOL/L — SIGNIFICANT CHANGE UP (ref 96–108)
CO2 SERPL-SCNC: 29 MMOL/L — SIGNIFICANT CHANGE UP (ref 22–31)
CREAT SERPL-MCNC: 0.89 MG/DL — SIGNIFICANT CHANGE UP (ref 0.5–1.3)
EGFR: 68 ML/MIN/1.73M2 — SIGNIFICANT CHANGE UP
EOSINOPHIL # BLD AUTO: 0.37 K/UL — SIGNIFICANT CHANGE UP (ref 0–0.5)
EOSINOPHIL NFR BLD AUTO: 5.1 % — SIGNIFICANT CHANGE UP (ref 0–6)
GLUCOSE SERPL-MCNC: 95 MG/DL — SIGNIFICANT CHANGE UP (ref 70–99)
HCT VFR BLD CALC: 27.7 % — LOW (ref 34.5–45)
HGB BLD-MCNC: 8 G/DL — LOW (ref 11.5–15.5)
IMM GRANULOCYTES NFR BLD AUTO: 0.7 % — SIGNIFICANT CHANGE UP (ref 0–0.9)
LYMPHOCYTES # BLD AUTO: 1.8 K/UL — SIGNIFICANT CHANGE UP (ref 1–3.3)
LYMPHOCYTES # BLD AUTO: 25 % — SIGNIFICANT CHANGE UP (ref 13–44)
MCHC RBC-ENTMCNC: 25.1 PG — LOW (ref 27–34)
MCHC RBC-ENTMCNC: 28.9 G/DL — LOW (ref 32–36)
MCV RBC AUTO: 86.8 FL — SIGNIFICANT CHANGE UP (ref 80–100)
MONOCYTES # BLD AUTO: 0.83 K/UL — SIGNIFICANT CHANGE UP (ref 0–0.9)
MONOCYTES NFR BLD AUTO: 11.5 % — SIGNIFICANT CHANGE UP (ref 2–14)
NEUTROPHILS # BLD AUTO: 4.05 K/UL — SIGNIFICANT CHANGE UP (ref 1.8–7.4)
NEUTROPHILS NFR BLD AUTO: 56.4 % — SIGNIFICANT CHANGE UP (ref 43–77)
PLATELET # BLD AUTO: 466 K/UL — HIGH (ref 150–400)
POTASSIUM SERPL-MCNC: 4 MMOL/L — SIGNIFICANT CHANGE UP (ref 3.5–5.3)
POTASSIUM SERPL-SCNC: 4 MMOL/L — SIGNIFICANT CHANGE UP (ref 3.5–5.3)
RBC # BLD: 3.19 M/UL — LOW (ref 3.8–5.2)
RBC # FLD: 16.7 % — HIGH (ref 10.3–14.5)
SODIUM SERPL-SCNC: 133 MMOL/L — LOW (ref 135–145)
WBC # BLD: 7.19 K/UL — SIGNIFICANT CHANGE UP (ref 3.8–10.5)
WBC # FLD AUTO: 7.19 K/UL — SIGNIFICANT CHANGE UP (ref 3.8–10.5)

## 2024-12-16 PROCEDURE — 99232 SBSQ HOSP IP/OBS MODERATE 35: CPT

## 2024-12-16 PROCEDURE — 99233 SBSQ HOSP IP/OBS HIGH 50: CPT

## 2024-12-16 RX ADMIN — CLOPIDOGREL 75 MILLIGRAM(S): 75 TABLET, FILM COATED ORAL at 11:41

## 2024-12-16 RX ADMIN — DILTIAZEM HYDROCHLORIDE 120 MILLIGRAM(S): 240 CAPSULE, COATED, EXTENDED RELEASE ORAL at 09:37

## 2024-12-16 RX ADMIN — FUROSEMIDE 40 MILLIGRAM(S): 40 TABLET ORAL at 09:37

## 2024-12-16 RX ADMIN — Medication 25 MILLIGRAM(S): at 09:37

## 2024-12-16 RX ADMIN — Medication 1 TABLET(S): at 11:40

## 2024-12-16 RX ADMIN — GABAPENTIN 800 MILLIGRAM(S): 300 CAPSULE ORAL at 21:08

## 2024-12-16 RX ADMIN — ZOLPIDEM TARTRATE 5 MILLIGRAM(S): 10 TABLET ORAL at 22:27

## 2024-12-16 RX ADMIN — Medication 10 MILLIGRAM(S): at 21:08

## 2024-12-16 RX ADMIN — GABAPENTIN 800 MILLIGRAM(S): 300 CAPSULE ORAL at 05:48

## 2024-12-16 RX ADMIN — METOPROLOL TARTRATE 25 MILLIGRAM(S): 100 TABLET, FILM COATED ORAL at 05:48

## 2024-12-16 RX ADMIN — Medication 1000 UNIT(S): at 11:41

## 2024-12-16 RX ADMIN — MONTELUKAST SODIUM 10 MILLIGRAM(S): 10 TABLET ORAL at 21:08

## 2024-12-16 RX ADMIN — Medication 10 MILLIGRAM(S): at 11:41

## 2024-12-16 RX ADMIN — GABAPENTIN 800 MILLIGRAM(S): 300 CAPSULE ORAL at 13:31

## 2024-12-16 RX ADMIN — AMIODARONE HYDROCHLORIDE 200 MILLIGRAM(S): 200 TABLET ORAL at 05:48

## 2024-12-16 NOTE — PROGRESS NOTE ADULT - SUBJECTIVE AND OBJECTIVE BOX
12/16/24: Pt seen by podiatry team today AM for day LE wounds. Pt resting bedside, NAD. Dressings clean, dry and intact.    PMH: Diabetes mellitus type II, controlled    Atrial fibrillation    Congestive heart failure    Dyspnea    Morbid obesity with BMI of 40.0-44.9, adult    Neuropathy    Peripheral venous insufficiency    Thyroid nodule    HTN (hypertension)    Cellulitis    At risk for sleep apnea      PSH:History of esophagogastroduodenoscopy (EGD)    H/O colonoscopy    Other complications of gastric band procedure    S/P left knee arthroscopy    Status post medial meniscus repair    History of cholecystectomy    S/P cataract surgery        Allergies:latex (Unknown)  [This allergen will not trigger allergy alert] IV DYE, IODINE CONTRAST (Unknown)  PC Pen VK (Rash)  penicillin (Hives; Urticaria)      Labs:                        8.0    7.19  )-----------( 466      ( 16 Dec 2024 06:07 )             27.7   12-16    133[L]  |  100  |  26[H]  ----------------------------<  95  4.0   |  29  |  0.89    Ca    8.7      16 Dec 2024 06:07    Vital Signs Last 24 Hrs  T(C): 37 (16 Dec 2024 00:00), Max: 37 (16 Dec 2024 00:00)  T(F): 98.6 (16 Dec 2024 00:00), Max: 98.6 (16 Dec 2024 00:00)  HR: 58 (16 Dec 2024 00:00) (58 - 63)  BP: 118/45 (16 Dec 2024 00:00) (106/53 - 118/45)  BP(mean): --  RR: 18 (16 Dec 2024 00:00) (18 - 18)  SpO2: 96% (16 Dec 2024 00:00) (96% - 99%)    Parameters below as of 15 Dec 2024 17:02  Patient On (Oxygen Delivery Method): room air    REVIEW OF SYSTEMS:    CONSTITUTIONAL: No weakness, fevers or chills  EYES: No visual changes  RESPIRATORY: No cough, wheezing; No shortness of breath  CARDIOVASCULAR: No chest pain or palpitations  GASTROINTESTINAL: No abdominal or epigastric pain. No nausea, vomiting; No diarrhea or constipation.   GENITOURINARY: No dysuria, frequency or hematuria  NEUROLOGICAL: No numbness or weakness  SKIN: See physical examination.  All other review of systems is negative unless indicated above    Physical Exam:   Constitutional: NAD, alert;  Lower Extremity Focus  Derm:  Skin warm, dry and supple bilateral.   Ulceration Right heel wound, partial thickness in nature, wound base granular wound size (approx 1.5 cm X 0.5cm X 0.1cm) and surrounding predominant hyperkeratotic tissue, no stephen-wound erythema/edema, no pus/purulence, no crepitus/fluctuance, no tracking/tunneling, no probe to bone.  Partial thickness ulceration noted to posterior medial Left lower midleg, some serous drainage, superficial wound bed, no malodor,  no crepitus/fluctuance, no tracking/tunneling, no PTB.   Vascular: Dorsalis Pedis and Posterior Tibial pulses weakly palpable  Neuro: Protective sensation intact to the level of the digits bilateral.  MSK: Muscle strength 5/5 all major muscle groups bilateral. Pt is minimally ambulatory       < from: CT Lower Extremity No Cont, Bilateral (12.05.24 @ 12:06) >    ACC: [id number] EXAM:  CT LWR EXT BI   ORDERED BY: LIBIA ALVARES     PROCEDURE DATE:  12/05/2024          INTERPRETATION:  CT OF THE bilateral lower extremities WITHOUT CONTRAST.    CLINICAL INFORMATION: Pain  TECHNIQUE: Axial CT images were obtained of the bilateral lower   extremities with coronal and sagittal reconstructions. No contrast was   administered. Three dimensional reconstructions were obtained.    COMPARISON: CT of the abdomen and pelvis performed 11/18/2024.    FINDINGS:    Bones: There is no acute fracture or dislocation. 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 The pubic symphysis is preserved. There is   moderate bilateral hip osteoarthrosis. Severe tricompartmental knee   osteoarthrosis with bone-on-bone apposition atthe medial compartments   bilaterally. Ring and arc type lesion within the distal tibia felt to be   consistent with benign chondroid lesion.    Soft tissues: Fat-containing umbilical hernia is present. Colonic   diverticula without evidence of diverticulitis. Atherosclerotic vascular   calcifications are present. Diffuse subcutaneous edema and stranding   throughout bilateral lower extremities with circumferential skin   thickening. There is no organized collection. There is no tracking gas.    IMPRESSION:    CT of bilateral lower extremities demonstrates diffuse circumferential   skin thickening, subcutaneous edema, and stranding which may be seen with   cellulitis or edema within fluid overload. No organized collection or   tract gas. No evidence of osteomyelitis.        --- End of Report ---    < end of copied text >

## 2024-12-16 NOTE — PROGRESS NOTE ADULT - ASSESSMENT
A: 75 y/o Female seen for the following:   -Rt heel partial thickness pressure wound, stable without acute SOI  -Lt posterior lower leg partial thickness venous stasis wound, stable without acute SOI  -BLE venous stasis    P:   Chart reviewed and Patient evaluated  CT scan reviewed: showing diffuse circumferential skin thickening, subcutaneous edema, and stranding which may be seen with cellulitis or edema within fluid overload. No organized collection or tract gas. No evidence of osteomyelitis.  Wound to Rt heel is partial thickness, granular and surrounding predominant hyperkeratotic tissue, improving size dimensions since last admission, no active discharge, no crepitus, no erythema, no malodor, no PTB, stable without gross SOI.   Wound to the posterior Left lower mid leg, with some serous drainage, no malodor, superficial in nature, stable without SOI.   WC: betadine with dry sterile dressing to Rt heel. Xeroform and Compressive dressings applied to LLE  Vascular reccs appreciated-->CFA endarterectomy on Tues 12/17  WBAT to BLE  Offload bilateral heels with two pillows under bilateral calfs or total CAIR boots to prevent further soft tissue damage as pt is has been predominately bedbound or sitting on a chair while placing pressure to heels.    No acute podiatric intervention warranted at this time. Continue with local wound care.   Pt to follow up at [facility] with Dr. Stovall for continued wound care treatment.   All additional care per Med appreciated  Patient demonstrated verbal understanding of all interventions and tolerated interventions well without any complications.       Case D/W with attending Dr. Stovall

## 2024-12-16 NOTE — PROGRESS NOTE ADULT - SUBJECTIVE AND OBJECTIVE BOX
74-year-old F, with PMH DM taken off metformin 1 month ago (after decreased HbA1C) ,  TAVR 2024 on Plavix followed by a two month rehab stay, A-fib on Xarelto, CHF, HLD, PVD, lower extremity elephantiasis  with varicose veins, who presented to [place] for SOB and lower extremity swelling that has been present for few weeks worsening this past week. Pt states she has orthopnea and decreased ambulation due to shortness of breath. PT denies any chest pain, fevers, chills, nightsweats. In the ED VSS. Propbp elevated trop negative ekg sinus ankush. CXR showed b/l pleural effusions admitted for CHF exacerbation.       Subjective: Patient seen and examined. denies cp, no sob, wants surgery to be done dureing this admission  Continue to have heel pain  , afebrile - plan for CFA endarterectomy tmrw       Additional results/Imaging, I have personally reviewed:All other systems reviewed and found to be negative with the exception of what has been described above        PHYSICAL EXAM:    Daily     Daily Weight in k.4 (16 Dec 2024 06:57)    ICU Vital Signs Last 24 Hrs  T(C): 36.8 (16 Dec 2024 16:29), Max: 37 (16 Dec 2024 00:00)  T(F): 98.2 (16 Dec 2024 16:29), Max: 98.6 (16 Dec 2024 00:00)  HR: 61 (16 Dec 2024 16:29) (56 - 61)  BP: 125/52 (16 Dec 2024 16:29) (106/40 - 125/52)  BP(mean): --  ABP: --  ABP(mean): --  RR: 18 (16 Dec 2024 16:29) (18 - 18)  SpO2: 100% (16 Dec 2024 16:29) (96% - 100%)    O2 Parameters below as of 16 Dec 2024 16:29  Patient On (Oxygen Delivery Method): room air  .PHYSICAL EXAM:  Gen: No acute distress   HEENT:  Head is normocephalic    Skin:  Warm and dry without any rash   NECK:  Supple without lymphadenopathy.   HEART:  Regular rate and rhythm. normal S1 and S2, No M/R/G  LUNGS:  dec breath sounds   ABDOMEN:  Soft, nontender, nondistended with good bowel sounds heard  EXTREMITIES:B/l heel dressing  NEUROLOGICAL:  Gross nonfocal    labs reviewed                                    8.0    7.19  )-----------( 466      ( 16 Dec 2024 06:07 )             27.7       CBC Full  -  ( 16 Dec 2024 06:07 )  WBC Count : 7.19 K/uL  RBC Count : 3.19 M/uL  Hemoglobin : 8.0 g/dL  Hematocrit : 27.7 %  Platelet Count - Automated : 466 K/uL  Mean Cell Volume : 86.8 fl  Mean Cell Hemoglobin : 25.1 pg  Mean Cell Hemoglobin Concentration : 28.9 g/dL  Auto Neutrophil # : 4.05 K/uL  Auto Lymphocyte # : 1.80 K/uL  Auto Monocyte # : 0.83 K/uL  Auto Eosinophil # : 0.37 K/uL  Auto Basophil # : 0.09 K/uL  Auto Neutrophil % : 56.4 %  Auto Lymphocyte % : 25.0 %  Auto Monocyte % : 11.5 %  Auto Eosinophil % : 5.1 %  Auto Basophil % : 1.3 %      1216    133[L]  |  100  |  26[H]  ----------------------------<  95  4.0   |  29  |  0.89    Ca    8.7      16 Dec 2024 06:07                      Urinalysis Basic - ( 16 Dec 2024 06:07 )    Color: x / Appearance: x / SG: x / pH: x  Gluc: 95 mg/dL / Ketone: x  / Bili: x / Urobili: x   Blood: x / Protein: x / Nitrite: x   Leuk Esterase: x / RBC: x / WBC x   Sq Epi: x / Non Sq Epi: x / Bacteria: x            MEDICATIONS  (STANDING):  aMIOdarone    Tablet 200 milliGRAM(s) Oral daily  atorvastatin 10 milliGRAM(s) Oral at bedtime  cholecalciferol 1000 Unit(s) Oral daily  clopidogrel Tablet 75 milliGRAM(s) Oral daily  diltiazem    milliGRAM(s) Oral daily  ezetimibe 10 milliGRAM(s) Oral daily  furosemide    Tablet 40 milliGRAM(s) Oral daily  gabapentin 800 milliGRAM(s) Oral three times a day  HYDROmorphone  Injectable 0.5 milliGRAM(s) IV Push once  influenza  Vaccine (HIGH DOSE) 0.5 milliLiter(s) IntraMuscular once  lactobacillus acidophilus 1 Tablet(s) Oral daily  metoprolol succinate ER 25 milliGRAM(s) Oral daily  montelukast 10 milliGRAM(s) Oral at bedtime  spironolactone 25 milliGRAM(s) Oral daily

## 2024-12-16 NOTE — PROGRESS NOTE ADULT - ASSESSMENT
74-year-old F, with PMH DM taken off metformin 1 month ago (after decreased HbA1C), TAVR 8/2024 on Plavix followed by a two month rehab stay, A-fib on Xarelto, CHF, HLD, PVD, lower extremity elephantiasis with varicose veins, who is admitted with CHF exacerbation. Vascular consulted for management of bilateral CFA stenoses as on arterial duplex. Femoral pulses asymmetric, no pedal pulses but doppler signals of DP/PT bilaterally present.  CTA aorta reviewed    Recommendations:  -Right CFA endarterectomy, aiming for Tuesday, 12/17  -Please assist with medical and cardiac clearance for this    -Per discussion with Leyla, he has cleared patient for her prior angiogram and the endarterectomy planned  -Continue holding Xarelto & using short-acting AC  -Pain control PRN  -Rest of care per primary team   -local wound care     Will d/w Vascular team & update accordingly   74-year-old F, with PMH DM taken off metformin 1 month ago (after decreased HbA1C), TAVR 8/2024 on Plavix followed by a two month rehab stay, A-fib on Xarelto, CHF, HLD, PVD, lower extremity elephantiasis with varicose veins, who is admitted with CHF exacerbation. Vascular consulted for management of bilateral CFA stenoses as on arterial duplex. Femoral pulses asymmetric, no pedal pulses but doppler signals of DP/PT bilaterally present.  CTA aorta reviewed    Recommendations:  -Right CFA endarterectomy, aiming for Tuesday, 12/17  -Please assist with medical and cardiac clearance for this    -Per discussion with Leyla, he has cleared patient for her prior angiogram and the endarterectomy planned    -Medical clearance given  -Continue holding Xarelto & using short-acting AC  -Pain control PRN  -Rest of care per primary team   -local wound care     Will d/w Vascular team & update accordingly

## 2024-12-16 NOTE — PROGRESS NOTE ADULT - ASSESSMENT
74-year-old F, with PMH DM taken off metformin 1 month ago (after decreased HbA1C) ,  TAVR 8/2024 on Plavix followed by a two month rehab stay, A-fib on Xarelto, CHF, HLD, PVD, lower extremity elephantiasis  with varicose veins, who presented to [place] for SOB and lower extremity swelling that has been present for few weeks worsening this past week.  CXR showed b/l pleural effusions admitted for CHF exacerbation.       # HFpEF exacerbation 50-55%  s/p IV lasix 40 switched to po lasix   - daily weights, I&O  - TTE - 1. Left ventricular cavity is moderately dilated. Left ventricular wall thickness is normal. Left ventricular systolic function is normal with an ejection fraction of 56 % by Ramirez's method of disks.   2. There is severe (grade 3) left ventricular diastolic dysfunction.  - Cardiology consulted recommendations appreciated   resume  farxiga after OR at the time of discharge     #Rt heel partial thickness pressure wound and Lt posterior lower leg partial thickness venous stasis wound, likely related to PAD  # B/l  common femoral artery stenosis  angiogram B/l LE by vascular   and for vascular intervention stent vs bypass  CFA endarterectomy on Tues 12/17  pre op eval  no acute medical contraindications for OR by vascular for CFA enarderectomy no sepsis , ESBL UTI completed 3 days  meropenem afebrile, hemodynamically stable , cardio stable  per vascular  cardiology cleared pt for CFAendarterectomy  Holding Lovenox SC for OR , resume  AC once cleared by vascular , high chads vasc        esbl ecoli UTI  completed 3 days  meropenem-      # Mild hypervolemic hyponatremia should improve with lasix   improved     # Afib on xeralto s/p ablation   AC held for OR    dispo- plan for CFA endarterectomy 12/17

## 2024-12-16 NOTE — PROGRESS NOTE ADULT - SUBJECTIVE AND OBJECTIVE BOX
Patient seen and examined at the bedside.   Denies heel pain. Aware Surgery is scheduled for the 17th.  AV        PAST MEDICAL & SURGICAL HISTORY:  Diabetes mellitus type II, controlled      Atrial fibrillation      Congestive heart failure      Dyspnea      Morbid obesity with BMI of 40.0-44.9, adult      Neuropathy      Peripheral venous insufficiency      Thyroid nodule      HTN (hypertension)      Cellulitis      At risk for sleep apnea      History of esophagogastroduodenoscopy (EGD)      H/O colonoscopy      Other complications of gastric band procedure      S/P left knee arthroscopy      Status post medial meniscus repair      History of cholecystectomy      S/P cataract surgery          MEDICATIONS  (STANDING):  aMIOdarone    Tablet 200 milliGRAM(s) Oral daily  atorvastatin 10 milliGRAM(s) Oral at bedtime  cholecalciferol 1000 Unit(s) Oral daily  clopidogrel Tablet 75 milliGRAM(s) Oral daily  diltiazem    milliGRAM(s) Oral daily  enoxaparin Injectable 100 milliGRAM(s) SubCutaneous every 12 hours  ezetimibe 10 milliGRAM(s) Oral daily  furosemide    Tablet 40 milliGRAM(s) Oral daily  gabapentin 800 milliGRAM(s) Oral three times a day  HYDROmorphone  Injectable 0.5 milliGRAM(s) IV Push once  influenza  Vaccine (HIGH DOSE) 0.5 milliLiter(s) IntraMuscular once  lactobacillus acidophilus 1 Tablet(s) Oral daily  metoprolol succinate ER 25 milliGRAM(s) Oral daily  montelukast 10 milliGRAM(s) Oral at bedtime  spironolactone 25 milliGRAM(s) Oral daily    MEDICATIONS  (PRN):  acetaminophen     Tablet .. 650 milliGRAM(s) Oral every 6 hours PRN Temp greater or equal to 38C (100.4F), Mild Pain (1 - 3)  aluminum hydroxide/magnesium hydroxide/simethicone Suspension 30 milliLiter(s) Oral every 4 hours PRN Dyspepsia  cyclobenzaprine 10 milliGRAM(s) Oral three times a day PRN Muscle Spasm  melatonin 3 milliGRAM(s) Oral at bedtime PRN Insomnia  naloxone Injectable 0.4 milliGRAM(s) IV Push once PRN oversedation  ondansetron Injectable 4 milliGRAM(s) IV Push every 8 hours PRN Nausea and/or Vomiting  oxycodone    5 mG/acetaminophen 325 mG 1 Tablet(s) Oral every 6 hours PRN Moderate Pain (4 - 6)  zolpidem 5 milliGRAM(s) Oral at bedtime PRN Insomnia      Allergies    latex (Unknown)  [This allergen will not trigger allergy alert] IV DYE, IODINE CONTRAST (Unknown)  PC Pen VK (Rash)  penicillin (Hives; Urticaria)    Intolerances        SOCIAL HISTORY:    FAMILY HISTORY:  Family history of breast cancer in mother    Family history of diabetes mellitus in mother    FHx: heart disease (Sibling)        PHYSICAL EXAM   GENERAL: NAD, well developed, obese  HEAD: Atraumatic, normocephalic  EYES: EOMI, PERRLA, conjunctiva and sclera clear  ENT: moist mucous membrane  NECK: supple, No JVD, midline trachea  CHEST/LUNG: No increased WOB, symmetric excursions  Heart: RRR ppp, no peripheral edema  ABDOMEN: round, soft, nondistended, nontender. right groin dressing c/d/i & w/o ecchymosis/hematoma  EXTREMITIES: +2 left fem, 1+ right fem pulses, doppler signals of b/l DP biphasic and b/l PT monophasic (weaker on right), dressing of right heel & left calf present  NERVOUS SYSTEM: AOx4, speech clear, no neuro-deficits  MSK: full ROM, no deformities  SKIN: warm to touch, no rash or lesions        Vital Signs Last 24 Hrs  T(C): 37 (16 Dec 2024 00:00), Max: 37 (16 Dec 2024 00:00)  T(F): 98.6 (16 Dec 2024 00:00), Max: 98.6 (16 Dec 2024 00:00)  HR: 58 (16 Dec 2024 00:00) (56 - 63)  BP: 118/45 (16 Dec 2024 00:00) (106/53 - 143/53)  BP(mean): --  RR: 18 (16 Dec 2024 00:00) (18 - 18)  SpO2: 96% (16 Dec 2024 00:00) (96% - 99%)    Parameters below as of 15 Dec 2024 17:02  Patient On (Oxygen Delivery Method): room air        I&O's Summary    14 Dec 2024 07:01  -  15 Dec 2024 07:00  --------------------------------------------------------  IN: 0 mL / OUT: 1200 mL / NET: -1200 mL            LABS:                        8.1    7.59  )-----------( 434      ( 14 Dec 2024 07:11 )             27.9     12-15    136  |  103  |  22  ----------------------------<  96  3.8   |  31  |  0.74    Ca    9.2      15 Dec 2024 06:44        Urinalysis Basic - ( 15 Dec 2024 06:44 )    Color: x / Appearance: x / SG: x / pH: x  Gluc: 96 mg/dL / Ketone: x  / Bili: x / Urobili: x   Blood: x / Protein: x / Nitrite: x   Leuk Esterase: x / RBC: x / WBC x   Sq Epi: x / Non Sq Epi: x / Bacteria: x      CAPILLARY BLOOD GLUCOSE            Cultures:      RADIOLOGY & ADDITIONAL STUDIES:

## 2024-12-17 ENCOUNTER — RESULT REVIEW (OUTPATIENT)
Age: 74
End: 2024-12-17

## 2024-12-17 LAB
ANION GAP SERPL CALC-SCNC: 5 MMOL/L — SIGNIFICANT CHANGE UP (ref 5–17)
BUN SERPL-MCNC: 28 MG/DL — HIGH (ref 7–23)
CALCIUM SERPL-MCNC: 8.5 MG/DL — SIGNIFICANT CHANGE UP (ref 8.5–10.1)
CHLORIDE SERPL-SCNC: 101 MMOL/L — SIGNIFICANT CHANGE UP (ref 96–108)
CO2 SERPL-SCNC: 29 MMOL/L — SIGNIFICANT CHANGE UP (ref 22–31)
CREAT SERPL-MCNC: 0.76 MG/DL — SIGNIFICANT CHANGE UP (ref 0.5–1.3)
EGFR: 82 ML/MIN/1.73M2 — SIGNIFICANT CHANGE UP
GLUCOSE BLDC GLUCOMTR-MCNC: 151 MG/DL — HIGH (ref 70–99)
GLUCOSE BLDC GLUCOMTR-MCNC: 160 MG/DL — HIGH (ref 70–99)
GLUCOSE SERPL-MCNC: 123 MG/DL — HIGH (ref 70–99)
HCT VFR BLD CALC: 25.6 % — LOW (ref 34.5–45)
HGB BLD-MCNC: 7.6 G/DL — LOW (ref 11.5–15.5)
INR BLD: 1.07 RATIO — SIGNIFICANT CHANGE UP (ref 0.85–1.16)
MAGNESIUM SERPL-MCNC: 2.1 MG/DL — SIGNIFICANT CHANGE UP (ref 1.6–2.6)
MCHC RBC-ENTMCNC: 25.4 PG — LOW (ref 27–34)
MCHC RBC-ENTMCNC: 29.7 G/DL — LOW (ref 32–36)
MCV RBC AUTO: 85.6 FL — SIGNIFICANT CHANGE UP (ref 80–100)
PHOSPHATE SERPL-MCNC: 5.3 MG/DL — HIGH (ref 2.5–4.5)
PLATELET # BLD AUTO: 474 K/UL — HIGH (ref 150–400)
POTASSIUM SERPL-MCNC: 4 MMOL/L — SIGNIFICANT CHANGE UP (ref 3.5–5.3)
POTASSIUM SERPL-SCNC: 4 MMOL/L — SIGNIFICANT CHANGE UP (ref 3.5–5.3)
PROTHROM AB SERPL-ACNC: 12.3 SEC — SIGNIFICANT CHANGE UP (ref 9.9–13.4)
RBC # BLD: 2.99 M/UL — LOW (ref 3.8–5.2)
RBC # FLD: 16.8 % — HIGH (ref 10.3–14.5)
SODIUM SERPL-SCNC: 135 MMOL/L — SIGNIFICANT CHANGE UP (ref 135–145)
WBC # BLD: 7.24 K/UL — SIGNIFICANT CHANGE UP (ref 3.8–10.5)
WBC # FLD AUTO: 7.24 K/UL — SIGNIFICANT CHANGE UP (ref 3.8–10.5)

## 2024-12-17 PROCEDURE — 88304 TISSUE EXAM BY PATHOLOGIST: CPT | Mod: 26

## 2024-12-17 PROCEDURE — 99233 SBSQ HOSP IP/OBS HIGH 50: CPT

## 2024-12-17 PROCEDURE — 88311 DECALCIFY TISSUE: CPT | Mod: 26

## 2024-12-17 PROCEDURE — 35371 RECHANNELING OF ARTERY: CPT | Mod: RT

## 2024-12-17 PROCEDURE — 37224: CPT | Mod: RT

## 2024-12-17 RX ORDER — CEFAZOLIN SODIUM 10 G
2000 VIAL (EA) INJECTION EVERY 8 HOURS
Refills: 0 | Status: DISCONTINUED | OUTPATIENT
Start: 2024-12-17 | End: 2024-12-17

## 2024-12-17 RX ORDER — 0.9 % SODIUM CHLORIDE 0.9 %
1000 INTRAVENOUS SOLUTION INTRAVENOUS
Refills: 0 | Status: DISCONTINUED | OUTPATIENT
Start: 2024-12-17 | End: 2024-12-18

## 2024-12-17 RX ORDER — LANOLIN ALCOHOL/MO/W.PET/CERES
100 CREAM (GRAM) TOPICAL DAILY
Refills: 0 | Status: DISCONTINUED | OUTPATIENT
Start: 2024-12-18 | End: 2024-12-19

## 2024-12-17 RX ORDER — KETOROLAC TROMETHAMINE 30 MG/ML
15 INJECTION INTRAMUSCULAR; INTRAVENOUS EVERY 6 HOURS
Refills: 0 | Status: DISCONTINUED | OUTPATIENT
Start: 2024-12-17 | End: 2024-12-19

## 2024-12-17 RX ORDER — CEFAZOLIN SODIUM 10 G
2000 VIAL (EA) INJECTION EVERY 8 HOURS
Refills: 0 | Status: COMPLETED | OUTPATIENT
Start: 2024-12-17 | End: 2024-12-18

## 2024-12-17 RX ORDER — ENOXAPARIN SODIUM 30 MG/.3ML
100 INJECTION SUBCUTANEOUS EVERY 12 HOURS
Refills: 0 | Status: DISCONTINUED | OUTPATIENT
Start: 2024-12-17 | End: 2024-12-18

## 2024-12-17 RX ORDER — ACETAMINOPHEN 500MG 500 MG/1
1000 TABLET, COATED ORAL ONCE
Refills: 0 | Status: DISCONTINUED | OUTPATIENT
Start: 2024-12-17 | End: 2024-12-19

## 2024-12-17 RX ORDER — ONDANSETRON HYDROCHLORIDE 4 MG/1
4 TABLET, FILM COATED ORAL ONCE
Refills: 0 | Status: DISCONTINUED | OUTPATIENT
Start: 2024-12-17 | End: 2024-12-17

## 2024-12-17 RX ORDER — FENTANYL 12 UG/H
25 PATCH, EXTENDED RELEASE TRANSDERMAL
Refills: 0 | Status: DISCONTINUED | OUTPATIENT
Start: 2024-12-17 | End: 2024-12-17

## 2024-12-17 RX ORDER — 0.9 % SODIUM CHLORIDE 0.9 %
1000 INTRAVENOUS SOLUTION INTRAVENOUS
Refills: 0 | Status: DISCONTINUED | OUTPATIENT
Start: 2024-12-17 | End: 2024-12-17

## 2024-12-17 RX ADMIN — KETOROLAC TROMETHAMINE 15 MILLIGRAM(S): 30 INJECTION INTRAMUSCULAR; INTRAVENOUS at 17:00

## 2024-12-17 RX ADMIN — Medication 10 MILLIGRAM(S): at 22:36

## 2024-12-17 RX ADMIN — KETOROLAC TROMETHAMINE 15 MILLIGRAM(S): 30 INJECTION INTRAMUSCULAR; INTRAVENOUS at 16:49

## 2024-12-17 RX ADMIN — AMIODARONE HYDROCHLORIDE 200 MILLIGRAM(S): 200 TABLET ORAL at 05:38

## 2024-12-17 RX ADMIN — KETOROLAC TROMETHAMINE 15 MILLIGRAM(S): 30 INJECTION INTRAMUSCULAR; INTRAVENOUS at 22:37

## 2024-12-17 RX ADMIN — ZOLPIDEM TARTRATE 5 MILLIGRAM(S): 10 TABLET ORAL at 22:50

## 2024-12-17 RX ADMIN — KETOROLAC TROMETHAMINE 15 MILLIGRAM(S): 30 INJECTION INTRAMUSCULAR; INTRAVENOUS at 23:07

## 2024-12-17 RX ADMIN — Medication 50 MILLILITER(S): at 21:52

## 2024-12-17 RX ADMIN — GABAPENTIN 800 MILLIGRAM(S): 300 CAPSULE ORAL at 05:38

## 2024-12-17 RX ADMIN — Medication 2000 MILLIGRAM(S): at 22:38

## 2024-12-17 RX ADMIN — METOPROLOL TARTRATE 25 MILLIGRAM(S): 100 TABLET, FILM COATED ORAL at 05:38

## 2024-12-17 RX ADMIN — MONTELUKAST SODIUM 10 MILLIGRAM(S): 10 TABLET ORAL at 22:36

## 2024-12-17 RX ADMIN — ENOXAPARIN SODIUM 100 MILLIGRAM(S): 30 INJECTION SUBCUTANEOUS at 22:37

## 2024-12-17 RX ADMIN — GABAPENTIN 800 MILLIGRAM(S): 300 CAPSULE ORAL at 22:37

## 2024-12-17 NOTE — PROGRESS NOTE ADULT - ASSESSMENT
73 yo woman with DM, HLD, chronic diastolic CHF, TAVR, chronic afib on Xarelto, PAD, PVD, chronic LE edema, presented for evaluation of worsening lower extremity swelling B/L, shortness of breath, worse with exertion, orthopnea, found to have hyponatremia, sinus bradycardia, CXR with B/L pleural effusions and pulmonary vascular congestion, also possible B/L LE cellulitis. Admitted to Medicine.     Acute on chronic diastolic CHF  No angina. Troponin negative. No sign of acute coronary syndrome. Exam and CXR consistent with volume overload. TTE done. LV cavity moderately dilated, normal wall thickness, normal LVEF, severe (grade 3) LV diastolic dysfunction, severe LA dilatation, mod to moderate RA dilatation, normal RV size and function. Appreciate Cardiology input. S/P diuresis with IV Lasix. Continue metoprolol ER, furosemide PO, spironolactone. Resume Farxiga on discharge. Monitor weight. Avoid added salt in diet. Outpatient follow up with Cardiology.     Hx of Afib s/p ablation  Continue Cardizem CD and amiodarone. Xarelto on hold for Vascular Surgery today. Will resume when cleared by Surgery.     HLD  Continue Zetia    ESBL E.coli UTI, present upon admission  Completed course of meropenem while admitted    Rt heel partial thickness wound and Lt posterior lower leg partial thickness wound  Continue wound care.     PAD, bilateral CFA stenosis  For CFA endarterectomy today. Management as per Vascular Surgery    Hypervolemic hyponatremia  Improved with diuresis. Continue to monitor    Moderate protein calorie malnutrition  Appreciate dietician input. Thiamine daily. MVI daily.

## 2024-12-17 NOTE — PROGRESS NOTE ADULT - ASSESSMENT
74-year-old F, with PMH DM taken off metformin 1 month ago (after decreased HbA1C), TAVR 8/2024 on Plavix followed by a two month rehab stay, A-fib on Xarelto, CHF, HLD, PVD, lower extremity elephantiasis with varicose veins, who is admitted with CHF exacerbation. Vascular consulted for management of bilateral CFA stenoses as on arterial duplex. Femoral pulses asymmetric, no pedal pulses but doppler signals of DP/PT bilaterally present.  CTA aorta reviewed    Recommendations:  -Right CFA endarterectomy today  -Please assist with medical and cardiac clearance for this    -Per discussion with Leyla, he has cleared patient for her prior angiogram and the endarterectomy planned    -Medical clearance given  -Continue holding Xarelto & using short-acting AC  -Pain control PRN  -Rest of care per primary team   -local wound care     Plan will be discussed with Vascular surgery attending, Dr. Manjarrez

## 2024-12-17 NOTE — PROGRESS NOTE ADULT - SUBJECTIVE AND OBJECTIVE BOX
Chief Complaint: Dyspnea on exertion, orthopnea, swelling    Interval Hx: Unable to evaluate patient at bedside as patient was in OR. Chart reviewed. AM notes from Vascular Surgery indicate patient was without acute complaints. In OR for R CFA endarterectomy.     ROS: Unable to perform today    Vitals:  T(F): 97.9 (17 Dec 2024 14:05), Max: 98.2 (16 Dec 2024 16:29)  HR: 64 (17 Dec 2024 14:30) (58 - 68)  BP: 103/65 (17 Dec 2024 14:30) (103/65 - 137/59)  RR: 15 (17 Dec 2024 14:30) (15 - 18)  SpO2: 100% (17 Dec 2024 14:30) (95% - 100%)    Exam:  Not done but as per Vascular....  GENERAL: NAD, well developed, obese  HEAD: Atraumatic, normocephalic  EYES: EOMI, PERRLA, conjunctiva and sclera clear  ENT: moist mucous membrane  NECK: supple, No JVD, midline trachea  CHEST/LUNG: No increased WOB, symmetric excursions  Heart: RRR ppp, no peripheral edema  ABDOMEN: round, soft, nondistended, nontender. right groin dressing c/d/i & w/o ecchymosis/hematoma  EXTREMITIES: +2 left fem, 1+ right fem pulses, doppler signals of b/l DP biphasic and b/l PT monophasic (weaker on right), dressing of right heel & left calf present  NERVOUS SYSTEM: AOx4, speech clear, no neuro-deficits  MSK: full ROM, no deformities  SKIN: warm to touch, no rash or lesions    Labs:    POCT glucose 150s-160             7.6  7.24 )-------( 474           25.6    MCV 86  RDW 16.8   Iron 18  TIBC 341  Iron sat 5%  Kvng 32  B12 195      135  |  101  |  28  --------------------< 123  4.0   |   29   | 0.76    Ca 8.5  Mg 2.1  Phos 5.3    Troponin negative   proBNP 1797    UA 12/6: +pyuria , +bacteriuria    Micro:  Urine culture > 100,000 CFU/mL ESBL E.coli  Blood culture x 2 12/6: Negative  COVID19, flu, RSV PCR 12/4: Negative    Imaging:  LOWELL/PVR 12/11: Limited study due to noncompressible vascular. Multistation significant peripheral vascular disease is suspected, correlate with recent runoff CTA (see below)    CTA abd aorta with runoff w/ IV cont 12/10:  No significant aortoiliac inflow disease. Limited assessment of the bilateral lower extremity arteries secondary to a combination of diffuse calcified plaque, small caliber vessels, and poor contrast bolus timing.    US venous duplex B/L LE 12/8: No evidence of deep venous thrombosis in either lower extremity. Limited visualization of the left calf veins.    US arterial duplex B/L LE 12/8: Elevated peak systolic velocities in the right common femoral artery suggestive of hemodynamically significant stenosis.Abnormal waveforms throughout the lower extremities bilaterally, which correlating with prior CT raises the possibility may be due to bilateral common femoral artery stenoses.Unable to visualize the left posterior tibial and bilateral peroneal arteries which could be due to to technical limitation versus occlusion.    CXR 12/6:  Low lung volumes. Cardiomegaly with mild prominence of the central pulmonary vascularity similar to prior.  no focal consolidation pleural effusion or pneumothorax.    CT B/L LE WO 12/5: Diffuse circumferential skin thickening, subcutaneous edema, and stranding which may be seen with cellulitis or edema within fluid overload. No organized collection or tract gas. No evidence of osteomyelitis.    US venous duplex B/L LE 12/4: No femoropopliteal DVT. Limited evaluation of the calf veins due to edema.     CXR 12/4: Cardiomegaly with pulmonary venous congestion perhaps slightly increased on the current exam and is without evidence of pleural effusion.    Cardiac Testing:  TTE 12/5: Left ventricular cavity is moderately dilated. Left ventricular wall thickness is normal. Left ventricular systolic function is normal with an ejection fraction of 56 % by Ramirez's method of disks. There is severe (grade 3) left ventricular diastolic dysfunction. Normal right ventricular cavity size and normal right ventricular systolic function. Left atrium is severely dilated. The right atrium is mildly to moderately. Mild mitral valve stenosis. Mild mitral regurgitation. Moderate tricuspid regurgitation. Estimated pulmonary artery systolic pressure is 54 mmHg, consistent with moderate pulmonary hypertension. A Transcatheter deployed (TAVR) valve replacement is present in the aortic position The prosthetic valve is well seated. No intravalvular regurgitation.    EKG 12/4: Rate 52. Sinus bradycardia. Minimal voltage criteria for LVH, may be normal variant    Meds:  MEDICATIONS  (STANDING):  acetaminophen   IVPB .. 1000 milliGRAM(s) IV Intermittent once  aMIOdarone    Tablet 200 milliGRAM(s) Oral daily  atorvastatin 10 milliGRAM(s) Oral at bedtime  ceFAZolin  Injectable. 2000 milliGRAM(s) IV Push every 8 hours  cholecalciferol 1000 Unit(s) Oral daily  clopidogrel Tablet 75 milliGRAM(s) Oral daily  diltiazem    milliGRAM(s) Oral daily  enoxaparin Injectable 100 milliGRAM(s) SubCutaneous every 12 hours  ezetimibe 10 milliGRAM(s) Oral daily  furosemide    Tablet 40 milliGRAM(s) Oral daily  gabapentin 800 milliGRAM(s) Oral three times a day  HYDROmorphone  Injectable 0.5 milliGRAM(s) IV Push once  influenza  Vaccine (HIGH DOSE) 0.5 milliLiter(s) IntraMuscular once  ketorolac   Injectable 15 milliGRAM(s) IV Push every 6 hours  lactated ringers. 1000 milliLiter(s) (50 mL/Hr) IV Continuous <Continuous>  lactobacillus acidophilus 1 Tablet(s) Oral daily  metoprolol succinate ER 25 milliGRAM(s) Oral daily  montelukast 10 milliGRAM(s) Oral at bedtime  spironolactone 25 milliGRAM(s) Oral daily    MEDICATIONS  (PRN):  acetaminophen     Tablet .. 650 milliGRAM(s) Oral every 6 hours PRN Temp greater or equal to 38C (100.4F), Mild Pain (1 - 3)  aluminum hydroxide/magnesium hydroxide/simethicone Suspension 30 milliLiter(s) Oral every 4 hours PRN Dyspepsia  cyclobenzaprine 10 milliGRAM(s) Oral three times a day PRN Muscle Spasm  fentaNYL    Injectable 25 MICROGram(s) IV Push every 5 minutes PRN Moderate Pain (4 - 6)  melatonin 3 milliGRAM(s) Oral at bedtime PRN Insomnia  naloxone Injectable 0.4 milliGRAM(s) IV Push once PRN oversedation  ondansetron Injectable 4 milliGRAM(s) IV Push once PRN Nausea and/or Vomiting  ondansetron Injectable 4 milliGRAM(s) IV Push every 8 hours PRN Nausea and/or Vomiting  oxycodone    5 mG/acetaminophen 325 mG 1 Tablet(s) Oral every 6 hours PRN Moderate Pain (4 - 6)  zolpidem 5 milliGRAM(s) Oral at bedtime PRN Insomnia

## 2024-12-17 NOTE — BRIEF OPERATIVE NOTE - OPERATION/FINDINGS
Left CFA Access, severe stenosis Right CFA, Occlusion Right PTA and Peroneal; Single vessel Runoff to foot via RENÉE
Endarterectomy of right common to superficial femoral artery, bovine patch angioplasty. Antegrade angiogram with Shockwave lithotripsy of distal SFA/popliteal lesion, then DCB angioplasty.

## 2024-12-17 NOTE — BRIEF OPERATIVE NOTE - NSICDXBRIEFPREOP_GEN_ALL_CORE_FT
PRE-OP DIAGNOSIS:  Stenosis of artery of right lower extremity 12-Dec-2024 10:41:57  Yajaira Coles  
PRE-OP DIAGNOSIS:  Peripheral arterial disease 17-Dec-2024 14:42:17  Amparo Becker

## 2024-12-17 NOTE — BRIEF OPERATIVE NOTE - DISPOSITION
ENTRY FOR [facility] MAMMOGRAM RAFFLE    Completion of mammogram before Oct 31st equals 1 entry. Completion same day as order/visit with EFM will equal 2 entries.    Mammogram Completion date: 12/21/23    Ordering provider: leroy MARTINEZ #: [id number]      [facility] Raffle will be held on Friday Nov 1st.  4 winners will be selected. (One from each office POD)  If chosen office staff will contact patient to coordinate raffle basket collection.    
Med-surg 
SICU for Q2H vascular chx

## 2024-12-17 NOTE — BRIEF OPERATIVE NOTE - NSICDXBRIEFPROCEDURE_GEN_ALL_CORE_FT
PROCEDURES:  Endarterectomy and angioplasty of right femoral artery 17-Dec-2024 14:42:08  Amparo Becker  
PROCEDURES:  Angiogram, lower extremity, right 12-Dec-2024 10:40:54  Yajaira Coles

## 2024-12-17 NOTE — BRIEF OPERATIVE NOTE - NSICDXBRIEFPOSTOP_GEN_ALL_CORE_FT
POST-OP DIAGNOSIS:  Stenosis of artery of right lower extremity 12-Dec-2024 10:42:23  Yajaira Coles  
POST-OP DIAGNOSIS:  Peripheral artery disease 17-Dec-2024 14:42:28  Amparo Becker

## 2024-12-17 NOTE — PROGRESS NOTE ADULT - SUBJECTIVE AND OBJECTIVE BOX
Patient seen and examined at the bedside.   Pain well controlled. mildly anxious  Preopped for today's endarterectomy  No complains at this time           PHYSICAL EXAM   GENERAL: NAD, well developed, obese  HEAD: Atraumatic, normocephalic  EYES: EOMI, PERRLA, conjunctiva and sclera clear  ENT: moist mucous membrane  NECK: supple, No JVD, midline trachea  CHEST/LUNG: No increased WOB, symmetric excursions  Heart: RRR ppp, no peripheral edema  ABDOMEN: round, soft, nondistended, nontender. right groin dressing c/d/i & w/o ecchymosis/hematoma  EXTREMITIES: +2 left fem, 1+ right fem pulses, doppler signals of b/l DP biphasic and b/l PT monophasic (weaker on right), dressing of right heel & left calf present  NERVOUS SYSTEM: AOx4, speech clear, no neuro-deficits  MSK: full ROM, no deformities  SKIN: warm to touch, no rash or lesions      Chief complaint:      PMHx:  Diabetes mellitus type II, controlled    Atrial fibrillation    Congestive heart failure    Dyspnea    Morbid obesity with BMI of 40.0-44.9, adult    Neuropathy    Peripheral venous insufficiency    Thyroid nodule    HTN (hypertension)    Cellulitis    At risk for sleep apnea        PSHx:  History of esophagogastroduodenoscopy (EGD)    H/O colonoscopy    Other complications of gastric band procedure    S/P left knee arthroscopy    Status post medial meniscus repair    History of cholecystectomy    S/P cataract surgery        FHx:  Family history of breast cancer in mother    Family history of diabetes mellitus in mother    FHx: heart disease (Sibling)        Vitals:  T(C): 36.7 (12-17 @ 02:55), Max: 36.8 (12-16 @ 09:30)  HR: 58 (12-17 @ 02:55) (56 - 61)  BP: 125/46 (12-17 @ 02:55) (106/40 - 125/52)  RR: 18 (12-17 @ 02:55) (18 - 18)  SpO2: 95% (12-17 @ 02:55) (95% - 100%)      I&Os    12-16 @ 07:01  -  12-17 @ 04:07  --------------------------------------------------------  IN:    Oral Fluid: 500 mL  Total IN: 500 mL    OUT:    Voided (mL): 550 mL  Total OUT: 550 mL    Total NET: -50 mL        .    Labs:  12-16 @ 06:07                    8.0  CBC: 7.19>)-------(<466                     27.7                 133 | 100 | 26    CMP:  ----------------------< 95               4.0 | 29 | 0.89                      Ca:8.7  Phos:-  Mg:-               -|      |-        LFTs:  ------|-|-----             -|      |-        Cultures:        Current Inpatient Medications:  acetaminophen     Tablet .. 650 milliGRAM(s) Oral every 6 hours PRN  aluminum hydroxide/magnesium hydroxide/simethicone Suspension 30 milliLiter(s) Oral every 4 hours PRN  aMIOdarone    Tablet 200 milliGRAM(s) Oral daily  atorvastatin 10 milliGRAM(s) Oral at bedtime  cholecalciferol 1000 Unit(s) Oral daily  clopidogrel Tablet 75 milliGRAM(s) Oral daily  cyclobenzaprine 10 milliGRAM(s) Oral three times a day PRN  diltiazem    milliGRAM(s) Oral daily  ezetimibe 10 milliGRAM(s) Oral daily  furosemide    Tablet 40 milliGRAM(s) Oral daily  gabapentin 800 milliGRAM(s) Oral three times a day  HYDROmorphone  Injectable 0.5 milliGRAM(s) IV Push once  influenza  Vaccine (HIGH DOSE) 0.5 milliLiter(s) IntraMuscular once  lactobacillus acidophilus 1 Tablet(s) Oral daily  melatonin 3 milliGRAM(s) Oral at bedtime PRN  metoprolol succinate ER 25 milliGRAM(s) Oral daily  montelukast 10 milliGRAM(s) Oral at bedtime  naloxone Injectable 0.4 milliGRAM(s) IV Push once PRN  ondansetron Injectable 4 milliGRAM(s) IV Push every 8 hours PRN  oxycodone    5 mG/acetaminophen 325 mG 1 Tablet(s) Oral every 6 hours PRN  spironolactone 25 milliGRAM(s) Oral daily  zolpidem 5 milliGRAM(s) Oral at bedtime PRN

## 2024-12-17 NOTE — ED PROVIDER NOTE - SOCIAL CONCERNS
Discharge Instructions for the Exchange of Percutaneous Feeding Tube (G tube, G-J tube)    You had a procedure today where an Interventional Radiologist exchanged your existing feeding tube. Gastrostomy tubes (G tubes) and Gastrojejunostomy tubes (G-J tubes) are used for feedings and medication administration and sometimes become clogged or dislodged.    During and After the procedure  During the procedure, registered nurses and radiology technologists were present at all times to monitor your vital signs, maintain a sterile environment and make sure you were as comfortable as possible. Additionally, warm blankets were provided for your comfort. You were monitored closely after the procedure to make sure your vitals returned to baseline and that you were able to safely leave the facility in the care of your friends or family.     General Guidelines for Use  If you have a G-J tube, avoid using the Jejunostomy ports for crushed medication as it will clog the tube. Instead, use liquid preparations of medications only, when available from your pharmacy. Flush the Jejunostomy port every 8 hours with 50-100ml of water or when used for feedings.  You may use the Gastrostomy port for crushed medication. Make sure the medication is finely crushed and mixed thoroughly with water before inserting as it may clog the tube. Flush the port liberally with 50-100ml of water after medication is given.  Please keep patient hands away from the site to avoid damage to the tube or accidental removal of the tube. Resume all usual medications and routine medical care at home or your nursing facility.  Wash your hands thoroughly before touching your G-tube.  Clean the area around the tube with mild soap and water.  Pat the area dry during bathing and as needed.  Clean the area more often if it becomes wet or weepy.  Keep the flange a few millimeters off the skin (just enough room to accommodate a gauze sponge). Pulling the flange too  tightly can damage the skin, but leaving the flange too loose leads to leaking around the tube. The medical team will cover these guidelines prior to discharge  Please remember to flush the tube at least daily with water to prevent clogging.  Please perform daily dressing/gauze changes until site is completely dry.  Once site is completely dry you may leave it open to air.    When to Call Your Doctor  Call your doctor immediately if you have any of the following:  Bleeding from the procedure site  Dizziness or lightheadedness  Sudden or increased shortness of breath  Sudden chest pain  Fever above 100.4°F  Shaking chills  Increasing redness, tenderness, or swelling at the procedure site  Foul smelling drainage from the procedure site  Increasing pain, with or without activity     Follow-Up:    Your G-tube, GJ-tube should be exchange approximately every 6 months. Please call the Interventional Radiology Scheduling team at [phone] to schedule your exchange.    Please call Interventional Radiology Nursing line at [phone] if you have any questions regarding this procedure.     *If you cannot reach the Interventional Radiology Nursing line or department, or you feel your problem is a medical emergency, go to the nearest Emergency Department for medical attention or call 911.      Want to Say “Thank You” to a Nurse?  The [employer] Award® was created in memory of JORGE Pina by his family to say thank you to bedside nurses who provide an outstanding level of care.  Submit a nomination using any method below.     OR    [email or web address]      None

## 2024-12-17 NOTE — BRIEF OPERATIVE NOTE - COMMENTS
Lovenox therapeutic to resume tonight  Ancef x 24 hr post op  Bedrest overnight  Resume diet  Resume Plavix tomorrow

## 2024-12-18 LAB
ALBUMIN SERPL ELPH-MCNC: 2.5 G/DL — LOW (ref 3.3–5)
ALP SERPL-CCNC: 65 U/L — SIGNIFICANT CHANGE UP (ref 40–120)
ALT FLD-CCNC: 11 U/L — LOW (ref 12–78)
ANION GAP SERPL CALC-SCNC: 4 MMOL/L — LOW (ref 5–17)
AST SERPL-CCNC: 15 U/L — SIGNIFICANT CHANGE UP (ref 15–37)
BILIRUB SERPL-MCNC: 0.3 MG/DL — SIGNIFICANT CHANGE UP (ref 0.2–1.2)
BUN SERPL-MCNC: 24 MG/DL — HIGH (ref 7–23)
CALCIUM SERPL-MCNC: 8 MG/DL — LOW (ref 8.5–10.1)
CHLORIDE SERPL-SCNC: 103 MMOL/L — SIGNIFICANT CHANGE UP (ref 96–108)
CO2 SERPL-SCNC: 27 MMOL/L — SIGNIFICANT CHANGE UP (ref 22–31)
CREAT SERPL-MCNC: 0.7 MG/DL — SIGNIFICANT CHANGE UP (ref 0.5–1.3)
EGFR: 90 ML/MIN/1.73M2 — SIGNIFICANT CHANGE UP
GLUCOSE SERPL-MCNC: 105 MG/DL — HIGH (ref 70–99)
HCT VFR BLD CALC: 24.9 % — LOW (ref 34.5–45)
HGB BLD-MCNC: 7.4 G/DL — LOW (ref 11.5–15.5)
MAGNESIUM SERPL-MCNC: 2.1 MG/DL — SIGNIFICANT CHANGE UP (ref 1.6–2.6)
MCHC RBC-ENTMCNC: 25.3 PG — LOW (ref 27–34)
MCHC RBC-ENTMCNC: 29.7 G/DL — LOW (ref 32–36)
MCV RBC AUTO: 85 FL — SIGNIFICANT CHANGE UP (ref 80–100)
MRSA PCR RESULT.: SIGNIFICANT CHANGE UP
PHOSPHATE SERPL-MCNC: 3.7 MG/DL — SIGNIFICANT CHANGE UP (ref 2.5–4.5)
PLATELET # BLD AUTO: 451 K/UL — HIGH (ref 150–400)
POTASSIUM SERPL-MCNC: 4.3 MMOL/L — SIGNIFICANT CHANGE UP (ref 3.5–5.3)
POTASSIUM SERPL-SCNC: 4.3 MMOL/L — SIGNIFICANT CHANGE UP (ref 3.5–5.3)
PROT SERPL-MCNC: 6.6 GM/DL — SIGNIFICANT CHANGE UP (ref 6–8.3)
RBC # BLD: 2.93 M/UL — LOW (ref 3.8–5.2)
RBC # FLD: 17 % — HIGH (ref 10.3–14.5)
S AUREUS DNA NOSE QL NAA+PROBE: DETECTED
SODIUM SERPL-SCNC: 134 MMOL/L — LOW (ref 135–145)
WBC # BLD: 11.45 K/UL — HIGH (ref 3.8–10.5)
WBC # FLD AUTO: 11.45 K/UL — HIGH (ref 3.8–10.5)

## 2024-12-18 PROCEDURE — 99233 SBSQ HOSP IP/OBS HIGH 50: CPT

## 2024-12-18 PROCEDURE — 99232 SBSQ HOSP IP/OBS MODERATE 35: CPT

## 2024-12-18 RX ORDER — RIVAROXABAN 10 MG/1
20 TABLET, FILM COATED ORAL
Refills: 0 | Status: DISCONTINUED | OUTPATIENT
Start: 2024-12-18 | End: 2024-12-19

## 2024-12-18 RX ORDER — ACETAMINOPHEN, DIPHENHYDRAMINE HCL, PHENYLEPHRINE HCL 325; 25; 5 MG/1; MG/1; MG/1
5 TABLET ORAL AT BEDTIME
Refills: 0 | Status: DISCONTINUED | OUTPATIENT
Start: 2024-12-18 | End: 2024-12-19

## 2024-12-18 RX ADMIN — CLOPIDOGREL 75 MILLIGRAM(S): 75 TABLET, FILM COATED ORAL at 10:15

## 2024-12-18 RX ADMIN — Medication 1 TABLET(S): at 10:15

## 2024-12-18 RX ADMIN — KETOROLAC TROMETHAMINE 15 MILLIGRAM(S): 30 INJECTION INTRAMUSCULAR; INTRAVENOUS at 12:26

## 2024-12-18 RX ADMIN — ENOXAPARIN SODIUM 100 MILLIGRAM(S): 30 INJECTION SUBCUTANEOUS at 10:08

## 2024-12-18 RX ADMIN — FUROSEMIDE 40 MILLIGRAM(S): 40 TABLET ORAL at 10:07

## 2024-12-18 RX ADMIN — Medication 10 MILLIGRAM(S): at 10:15

## 2024-12-18 RX ADMIN — ACETAMINOPHEN, DIPHENHYDRAMINE HCL, PHENYLEPHRINE HCL 5 MILLIGRAM(S): 325; 25; 5 TABLET ORAL at 22:44

## 2024-12-18 RX ADMIN — Medication 1000 UNIT(S): at 10:15

## 2024-12-18 RX ADMIN — KETOROLAC TROMETHAMINE 15 MILLIGRAM(S): 30 INJECTION INTRAMUSCULAR; INTRAVENOUS at 22:44

## 2024-12-18 RX ADMIN — GABAPENTIN 800 MILLIGRAM(S): 300 CAPSULE ORAL at 05:47

## 2024-12-18 RX ADMIN — Medication 1 TABLET(S): at 10:08

## 2024-12-18 RX ADMIN — AMIODARONE HYDROCHLORIDE 200 MILLIGRAM(S): 200 TABLET ORAL at 06:14

## 2024-12-18 RX ADMIN — DILTIAZEM HYDROCHLORIDE 120 MILLIGRAM(S): 240 CAPSULE, COATED, EXTENDED RELEASE ORAL at 10:07

## 2024-12-18 RX ADMIN — MONTELUKAST SODIUM 10 MILLIGRAM(S): 10 TABLET ORAL at 22:44

## 2024-12-18 RX ADMIN — Medication 100 MILLIGRAM(S): at 10:08

## 2024-12-18 RX ADMIN — KETOROLAC TROMETHAMINE 15 MILLIGRAM(S): 30 INJECTION INTRAMUSCULAR; INTRAVENOUS at 07:03

## 2024-12-18 RX ADMIN — KETOROLAC TROMETHAMINE 15 MILLIGRAM(S): 30 INJECTION INTRAMUSCULAR; INTRAVENOUS at 11:56

## 2024-12-18 RX ADMIN — Medication 25 MILLIGRAM(S): at 10:08

## 2024-12-18 RX ADMIN — KETOROLAC TROMETHAMINE 15 MILLIGRAM(S): 30 INJECTION INTRAMUSCULAR; INTRAVENOUS at 18:17

## 2024-12-18 RX ADMIN — GABAPENTIN 800 MILLIGRAM(S): 300 CAPSULE ORAL at 22:44

## 2024-12-18 RX ADMIN — Medication 10 MILLIGRAM(S): at 22:44

## 2024-12-18 RX ADMIN — Medication 2000 MILLIGRAM(S): at 05:46

## 2024-12-18 RX ADMIN — GABAPENTIN 800 MILLIGRAM(S): 300 CAPSULE ORAL at 14:04

## 2024-12-18 RX ADMIN — ZOLPIDEM TARTRATE 5 MILLIGRAM(S): 10 TABLET ORAL at 22:44

## 2024-12-18 NOTE — PROGRESS NOTE ADULT - ASSESSMENT
73 yo woman with DM, HLD, chronic diastolic CHF, TAVR, chronic afib on Xarelto, PAD, PVD, chronic LE edema, presented for evaluation of worsening lower extremity swelling B/L, shortness of breath, worse with exertion, orthopnea, found to have hyponatremia, sinus bradycardia, CXR with B/L pleural effusions and pulmonary vascular congestion, also possible B/L LE cellulitis. Admitted to Medicine.     Acute on chronic diastolic CHF  No angina. Troponin negative. No sign of acute coronary syndrome. Exam and CXR consistent with volume overload. TTE done. LV cavity moderately dilated, normal wall thickness, normal LVEF, severe (grade 3) LV diastolic dysfunction, severe LA dilatation, mod to moderate RA dilatation, normal RV size and function. Appreciate Cardiology input. S/P diuresis with IV Lasix. Continue metoprolol ER, furosemide PO, spironolactone. Resume Farxiga on discharge. Monitor weight. Avoid added salt in diet. Outpatient follow up with Cardiology.     Hx of Afib s/p ablation  Continue Cardizem CD and amiodarone. Xarelto for stroke risk reduction    HLD  Continue Zetia    ESBL E.coli UTI, present upon admission  Completed course of meropenem while admitted    Rt heel partial thickness wound and Lt posterior lower leg partial thickness wound  Continue wound care.     PAD, bilateral CFA stenosis  S/P endarterectomy of right common to superficial femoral artery, bovine patch angioplasty. Antegrade angiogram with shockwave lithotripsy of distal SFA/popliteal lesion, then DCB angioplasty. Management as per Vascular Surgery    Hypervolemic hyponatremia  Improved with diuresis. Continue to monitor    Moderate protein calorie malnutrition  Appreciate dietician input. Thiamine daily. MVI daily.        Dispo: DC planning 75 yo woman with DM, HLD, chronic diastolic CHF, TAVR, chronic afib on Xarelto, PAD, PVD, chronic LE edema, presented for evaluation of worsening lower extremity swelling B/L, shortness of breath, worse with exertion, orthopnea, found to have hyponatremia, sinus bradycardia, CXR with B/L pleural effusions and pulmonary vascular congestion, also possible B/L LE cellulitis. Admitted to Medicine.     Acute on chronic diastolic CHF  No angina. Troponin negative. No sign of acute coronary syndrome. Exam and CXR consistent with volume overload. TTE done. LV cavity moderately dilated, normal wall thickness, normal LVEF, severe (grade 3) LV diastolic dysfunction, severe LA dilatation, mod to moderate RA dilatation, normal RV size and function. Appreciate Cardiology input. S/P diuresis with IV Lasix. Continue metoprolol ER, furosemide PO, spironolactone. Resume Farxiga on discharge. Monitor weight. Avoid added salt in diet. Outpatient follow up with Cardiology.     Hx of Afib s/p ablation  Continue Cardizem CD and amiodarone. Xarelto for stroke risk reduction    HLD  Continue Zetia    ESBL E.coli UTI, present upon admission  Completed course of meropenem while admitted    Rt heel partial thickness wound and Lt posterior lower leg partial thickness wound  Continue wound care.     PAD, bilateral CFA stenosis  S/P endarterectomy of right common to superficial femoral artery, bovine patch angioplasty. Antegrade angiogram with shockwave lithotripsy of distal SFA/popliteal lesion, then DCB angioplasty. Management as per Vascular Surgery    Hypervolemic hyponatremia  Improved with diuresis. Continue to monitor    Moderate protein calorie malnutrition  Appreciate dietician input. Thiamine daily. MVI daily.      Physical deconditioning  Seen by PT, recommended home with home PT      Dispo: DC planning, anticipate DC home F2F

## 2024-12-18 NOTE — PROVIDER CONTACT NOTE (OTHER) - DATE AND TIME:
06-Dec-2024 14:05
18-Dec-2024 11:03
06-Dec-2024 08:20
08-Dec-2024 06:16
18-Dec-2024 02:11
18-Dec-2024 05:28

## 2024-12-18 NOTE — PROGRESS NOTE ADULT - TIME BILLING
- extensive review of patient's medical chart including prior hospital encounters, current admission progress notes, labs, imaging and other testing, seeing and evaluating patient at bedside, explaining to patient regarding current condition and plan of care, then ordering tests and collaborating with specialty consultants including Vascular Surgery and Podiatry, and finally, documenting today's findings and plan.

## 2024-12-18 NOTE — PROGRESS NOTE ADULT - ATTENDING COMMENTS
Patient seen and examined with resident at bedside.  Agree with physical exam findings and treatment plan.  Continue the wound care of left lower extremity wound and right heel wound as ordered.
seen and examined b/l foot wounds R>L with leg pain noninvasive studies significant for PAD and right CFA/prox SFA disease   discussed with patient given tissue loss and wounds would require revasc procedure likely will be right femoral endarterectomy but will obtain formal right leg angiogram tomorrow to better assess outflow  continue current plan  discussed patient's case with her long time vascular surgeon Dr. Yoo he has never done an angiogram but reviewed imaging and agrees with plan

## 2024-12-18 NOTE — PHYSICAL THERAPY INITIAL EVALUATION ADULT - IMPAIRMENTS FOUND, PT EVAL
gait, locomotion, and balance/muscle strength/neuromotor development and sensory integration
Monthly or less
gait, locomotion, and balance/muscle strength/neuromotor development and sensory integration

## 2024-12-18 NOTE — PROGRESS NOTE ADULT - NUTRITIONAL ASSESSMENT
This patient has been assessed with a concern for Malnutrition and has been determined to have a diagnosis/diagnoses of Moderate protein-calorie malnutrition.    This patient is being managed with:   Diet Regular-  1500mL Fluid Restriction ([id number])  Entered: Dec  8 2024 12:29PM  
This patient has been assessed with a concern for Malnutrition and has been determined to have a diagnosis/diagnoses of Moderate protein-calorie malnutrition.    This patient is being managed with:   Diet Regular-  1500mL Fluid Restriction ([id number])  Entered: Dec  8 2024 12:29PM  
This patient has been assessed with a concern for Malnutrition and has been determined to have a diagnosis/diagnoses of Moderate protein-calorie malnutrition.    This patient is being managed with:   Diet Regular-  DASH/TLC {Sodium & Cholesterol Restricted} (DASH)  1500mL Fluid Restriction ([id number])  Entered: Dec 17 2024  2:45AM  
This patient has been assessed with a concern for Malnutrition and has been determined to have a diagnosis/diagnoses of Moderate protein-calorie malnutrition.    This patient is being managed with:   Diet DASH/TLC-  Sodium & Cholesterol Restricted  Entered: Dec  4 2024  5:45PM  
This patient has been assessed with a concern for Malnutrition and has been determined to have a diagnosis/diagnoses of Moderate protein-calorie malnutrition.    This patient is being managed with:   Diet DASH/TLC-  Sodium & Cholesterol Restricted  Entered: Dec  4 2024  5:45PM  
This patient has been assessed with a concern for Malnutrition and has been determined to have a diagnosis/diagnoses of Moderate protein-calorie malnutrition.    This patient is being managed with:   Diet Regular-  1500mL Fluid Restriction ([id number])  Entered: Dec 12 2024  3:06AM  
This patient has been assessed with a concern for Malnutrition and has been determined to have a diagnosis/diagnoses of Moderate protein-calorie malnutrition.    This patient is being managed with:   Diet Regular-  1500mL Fluid Restriction ([id number])  Entered: Dec 12 2024  3:06AM  
This patient has been assessed with a concern for Malnutrition and has been determined to have a diagnosis/diagnoses of Moderate protein-calorie malnutrition.    This patient is being managed with:   Diet Regular-  1500mL Fluid Restriction ([id number])  Entered: Dec  8 2024 12:29PM  
This patient has been assessed with a concern for Malnutrition and has been determined to have a diagnosis/diagnoses of Moderate protein-calorie malnutrition.    This patient is being managed with:   Diet Regular-  1500mL Fluid Restriction ([id number])  Entered: Dec 12 2024  3:06AM  
This patient has been assessed with a concern for Malnutrition and has been determined to have a diagnosis/diagnoses of Moderate protein-calorie malnutrition.    This patient is being managed with:   Diet Regular-  1500mL Fluid Restriction ([id number])  Entered: Dec 12 2024  3:06AM  
This patient has been assessed with a concern for Malnutrition and has been determined to have a diagnosis/diagnoses of Moderate protein-calorie malnutrition.    This patient is being managed with:   Diet DASH/TLC-  Sodium & Cholesterol Restricted  Entered: Dec  4 2024  5:45PM  
This patient has been assessed with a concern for Malnutrition and has been determined to have a diagnosis/diagnoses of Moderate protein-calorie malnutrition.    This patient is being managed with:   Diet Regular-  1500mL Fluid Restriction ([id number])  Entered: Dec  8 2024 12:29PM  
This patient has been assessed with a concern for Malnutrition and has been determined to have a diagnosis/diagnoses of Moderate protein-calorie malnutrition.    This patient is being managed with:   Diet Regular-  1500mL Fluid Restriction ([id number])  Entered: Dec 12 2024  3:06AM  
This patient has been assessed with a concern for Malnutrition and has been determined to have a diagnosis/diagnoses of Moderate protein-calorie malnutrition.    This patient is being managed with:   Diet Regular-  1500mL Fluid Restriction ([id number])  Entered: Dec 12 2024  3:06AM  
This patient has been assessed with a concern for Malnutrition and has been determined to have a diagnosis/diagnoses of Moderate protein-calorie malnutrition.    This patient is being managed with:   Diet Regular-  1500mL Fluid Restriction ([id number])  Entered: Dec 12 2024  3:06AM  
This patient has been assessed with a concern for Malnutrition and has been determined to have a diagnosis/diagnoses of Moderate protein-calorie malnutrition.    This patient is being managed with:   Diet Regular-  DASH/TLC {Sodium & Cholesterol Restricted} (DASH)  1500mL Fluid Restriction ([id number])  Entered: Dec 17 2024  2:45AM

## 2024-12-18 NOTE — PHYSICAL THERAPY INITIAL EVALUATION ADULT - ADDITIONAL COMMENTS
Pt. stated inside home does not use any AD, She uses cane for outside short distance and Rollator outside long distance. Pt. stated after first hospitalization she was in [facility] for 90 day. Pt. was receiving home PT upon recent hospitalization.
Pt. stated inside home does not use any AD, She uses cane for outside short distance and Rollator outside long distance. Pt. stated after first hospitalization she was in [facility] for 90 day. Pt. was receiving home PT upon recent hospitalization.

## 2024-12-18 NOTE — PROGRESS NOTE ADULT - SUBJECTIVE AND OBJECTIVE BOX
Chief Complaint: Dyspnea on exertion, orthopnea, swelling    Interval Hx: Patient seen and examined this AM. Patient was just ambulating in hallway of SICU with rollator, doing well. She was dyspneic with the exertion. Recovered in armchair at bedside. No acute complaints. No chest pain or tightness. No orthopnea or PND. No abdominal complaints or urinary complaints. She expressed eagerness to be discharged.     ROS: Multi system review is comprehensively negative x 10 systems    Vitals:  T(F): 98.4 (18 Dec 2024 12:58), Max: 98.4 (18 Dec 2024 12:58)  HR: 58 (18 Dec 2024 14:00) (58 - 68)  BP: 111/55 (18 Dec 2024 14:00) (86/42 - 132/41)  RR: 16 (18 Dec 2024 14:00) (12 - 25)  SpO2: 98% (18 Dec 2024 12:00) (94% - 100%) on room air    Exam:  GEN: Comfortable-appearing  HEENT: NCAT PERRL EOMI, PERRLA, conjunctiva and sclera clear, MMM  NECK: Supple, no JVD, midline trachea  CHEST: No increased WOB, symmetric excursions, clear lungs B/L  CVS: s1 s2 normal, RRR  ABD: Non distended\, +BS, soft non tender  EXT: R groin dressing C/D/I. Full ROM  SKIN: Warm, dry, no rash  NEURO: AOx4, speech clear, no neuro-deficits    Labs:                7.4  11.45 )-------( 451             24.9    MCV 86  RDW 16.8   Iron 18  TIBC 341  Iron sat 5%  Kvng 32  B12 195      134  |  103  |  24  --------------------< 105  4.3   |   27   | 0.70    Ca 8.0  Mg 2.1  Phos 3.7    Tprot 6.6  /  Alb 2.5  /  AST 15  /  ALT 11  /  AlkPhos 65    Troponin negative   proBNP 1797    UA 12/6: +pyuria , +bacteriuria    Micro:  MRSA/MSSA PCR screen 12/17: MSSA detected  Urine culture > 100,000 CFU/mL ESBL E.coli  Blood culture x 2 12/6: Negative  COVID19, flu, RSV PCR 12/4: Negative    Imaging:  LOWELL/PVR 12/11: Limited study due to noncompressible vascular. Multistation significant peripheral vascular disease is suspected, correlate with recent runoff CTA (see below)    CTA abd aorta with runoff w/ IV cont 12/10:  No significant aortoiliac inflow disease. Limited assessment of the bilateral lower extremity arteries secondary to a combination of diffuse calcified plaque, small caliber vessels, and poor contrast bolus timing.    US venous duplex B/L LE 12/8: No evidence of deep venous thrombosis in either lower extremity. Limited visualization of the left calf veins.    US arterial duplex B/L LE 12/8: Elevated peak systolic velocities in the right common femoral artery suggestive of hemodynamically significant stenosis.Abnormal waveforms throughout the lower extremities bilaterally, which correlating with prior CT raises the possibility may be due to bilateral common femoral artery stenoses.Unable to visualize the left posterior tibial and bilateral peroneal arteries which could be due to to technical limitation versus occlusion.    CXR 12/6:  Low lung volumes. Cardiomegaly with mild prominence of the central pulmonary vascularity similar to prior.  no focal consolidation pleural effusion or pneumothorax.    CT B/L LE WO 12/5: Diffuse circumferential skin thickening, subcutaneous edema, and stranding which may be seen with cellulitis or edema within fluid overload. No organized collection or tract gas. No evidence of osteomyelitis.    US venous duplex B/L LE 12/4: No femoropopliteal DVT. Limited evaluation of the calf veins due to edema.     CXR 12/4: Cardiomegaly with pulmonary venous congestion perhaps slightly increased on the current exam and is without evidence of pleural effusion.    Cardiac Testing:  TTE 12/5: Left ventricular cavity is moderately dilated. Left ventricular wall thickness is normal. Left ventricular systolic function is normal with an ejection fraction of 56 % by Ramirez's method of disks. There is severe (grade 3) left ventricular diastolic dysfunction. Normal right ventricular cavity size and normal right ventricular systolic function. Left atrium is severely dilated. The right atrium is mildly to moderately. Mild mitral valve stenosis. Mild mitral regurgitation. Moderate tricuspid regurgitation. Estimated pulmonary artery systolic pressure is 54 mmHg, consistent with moderate pulmonary hypertension. A Transcatheter deployed (TAVR) valve replacement is present in the aortic position The prosthetic valve is well seated. No intravalvular regurgitation.    EKG 12/4: Rate 52. Sinus bradycardia. Minimal voltage criteria for LVH, may be normal variant    Meds:  MEDICATIONS  (STANDING):  aMIOdarone    Tablet 200 milliGRAM(s) Oral daily  atorvastatin 10 milliGRAM(s) Oral at bedtime  cholecalciferol 1000 Unit(s) Oral daily  clopidogrel Tablet 75 milliGRAM(s) Oral daily  diltiazem    milliGRAM(s) Oral daily  ezetimibe 10 milliGRAM(s) Oral daily  furosemide    Tablet 40 milliGRAM(s) Oral daily  gabapentin 800 milliGRAM(s) Oral three times a day  ketorolac   Injectable 15 milliGRAM(s) IV Push every 6 hours  lactobacillus acidophilus 1 Tablet(s) Oral daily  metoprolol succinate ER 25 milliGRAM(s) Oral daily  montelukast 10 milliGRAM(s) Oral at bedtime  multivitamin/minerals 1 Tablet(s) Oral daily  rivaroxaban 20 milliGRAM(s) Oral with dinner  spironolactone 25 milliGRAM(s) Oral daily  thiamine 100 milliGRAM(s) Oral daily    MEDICATIONS  (PRN):  acetaminophen     Tablet .. 650 milliGRAM(s) Oral every 6 hours PRN Temp greater or equal to 38C (100.4F), Mild Pain (1 - 3)  aluminum hydroxide/magnesium hydroxide/simethicone Suspension 30 milliLiter(s) Oral every 4 hours PRN Dyspepsia  cyclobenzaprine 10 milliGRAM(s) Oral three times a day PRN Muscle Spasm  melatonin 5 milliGRAM(s) Oral at bedtime PRN Insomnia  naloxone Injectable 0.4 milliGRAM(s) IV Push once PRN oversedation  ondansetron Injectable 4 milliGRAM(s) IV Push every 8 hours PRN Nausea and/or Vomiting  oxycodone    5 mG/acetaminophen 325 mG 1 Tablet(s) Oral every 6 hours PRN Moderate Pain (4 - 6)  zolpidem 5 milliGRAM(s) Oral at bedtime PRN Insomnia

## 2024-12-18 NOTE — PHYSICAL THERAPY INITIAL EVALUATION ADULT - CRITERIA FOR SKILLED THERAPEUTIC INTERVENTIONS
impairments found/functional limitations in following categories/risk reduction/prevention/anticipated equipment needs at discharge/anticipated discharge recommendation
impairments found/functional limitations in following categories/risk reduction/prevention/rehab potential/therapy frequency/predicted duration of therapy intervention/anticipated equipment needs at discharge/anticipated discharge recommendation

## 2024-12-18 NOTE — PROVIDER CONTACT NOTE (OTHER) - ASSESSMENT
BP 94/35 MAP 56 via right radial margot, 103/44 MAP 61 via left upper arm noninvasive blood pressure cuff. HR 63 normal sinus rhythm. denies lightheadedness or dizziness. patient received 5mg Ambien and is having trouble sleeping, states she takes 10mg ambien at home.
Patient A&O x 4 - lethargic. Patient complains of nausea and vomiting. NSR on tele. O2 sat 94% on 2L NC.
patient denies light headndedness, dizziness, palpitations. /35 MAP 61. heart rate 63 normal sinus
A&Ox4 - lethargic. NSR on tele. VSS.

## 2024-12-18 NOTE — PHYSICAL THERAPY INITIAL EVALUATION ADULT - NSPTDMEREC_GEN_A_CORE
Pt will requires a rolling walker at home due to theit dx of difficulty walking to help complete their MRADL's./rolling walker
rolling walker

## 2024-12-18 NOTE — PROGRESS NOTE ADULT - REASON FOR ADMISSION
CHF exacerbation
CHF exacerbation  UTI  Lower extremity wounds  Hypervolemic hyponatremia
CHF exacerbation  UTI  Lower extremity wounds  Hypervolemic hyponatremia

## 2024-12-18 NOTE — PHYSICAL THERAPY INITIAL EVALUATION ADULT - PERTINENT HX OF CURRENT PROBLEM, REHAB EVAL
74-year-old F, with PMH DM taken off metformin 1 month ago (after decreased HbA1C) ,  TAVR 8/2024 on Plavix followed by a two month rehab stay, A-fib on Xarelto, CHF, HLD, PVD, lower extremity elephantiasis  with varicose veins, who presented to [facility] for SOB and lower extremity swelling that has been present for few weeks worsening this past week. Pt states she has orthopnea and decreased ambulation due to shortness of breath. PT denies any chest pain, fevers, chills, nightsweats. In the ED VSS. Propbp elevated trop negative ekg sinus ankush. CXR showed b/l pleural effusions admitted for CHF exacerbation. CHF exacerbation EF 50-55%. No femoropopliteal DVT.
74-year-old F, with PMH DM taken off metformin 1 month ago (after decreased HbA1C) ,  TAVR 8/2024 on Plavix followed by a two month rehab stay, A-fib on Xarelto, CHF, HLD, PVD, lower extremity elephantiasis  with varicose veins, who presented to [place] for SOB and lower extremity swelling that has been present for few weeks worsening this past week. Pt states she has orthopnea and decreased ambulation due to shortness of breath. PT denies any chest pain, fevers, chills, nightsweats. In the ED VSS. Propbp elevated trop negative ekg sinus ankush. CXR showed b/l pleural effusions admitted for CHF exacerbation. CHF exacerbation EF 50-55%. No femoropopliteal DVT.  Pt. re-eval with this PT today, s/p vascular procedure. R CFA endarterectomy.

## 2024-12-18 NOTE — PHYSICAL THERAPY INITIAL EVALUATION ADULT - GENERAL OBSERVATIONS, REHAB EVAL
Pt. received semi supine in bed SICU + monitor +lantigua +wound vac, agreeable to PT. Bed mob with min A, sit to stand and amb with RW  ft. back to sit on BSC + alarm and left with all lines intact, table, CBWR, RN aware.
Pt. received sitting on BSC + alarm c/o B/LLE's pain. Sit to stand to RW Min A amb to Bed with RW CGA 5 ft. Left semi supine B/LLE's elevated RN in room. hgh 7.2 today and INR 3.41 on 12/4

## 2024-12-18 NOTE — PROVIDER CONTACT NOTE (OTHER) - REASON
low diastolic BP, low MAP
Patient nauseous, vomiting, lethargic. Desat to 78% on RA.
Dr Justo Mayer (PCP)
Medications without parameter
low blood pressure, low MAP
Temperature of 101.9

## 2024-12-18 NOTE — PROVIDER CONTACT NOTE (OTHER) - ACTION/TREATMENT ORDERED:
Give Zofran. Continue to monitor.
Hold both metoprolol and amiodarone.
hold metoprolol that is due now, okay to give amiodarone.
Urinalysis and urine culture. Tylenol.
a line leveled and zeroed. okay with vital signs. melatonin ordered

## 2024-12-18 NOTE — PROGRESS NOTE ADULT - ASSESSMENT
A: 75 y/o Female seen for the following:   -Rt heel partial thickness pressure wound, stable without acute SOI  -Lt posterior lower leg partial thickness venous stasis wound, stable without acute SOI  -BLE venous stasis    P:   Chart reviewed and Patient evaluated  CT scan reviewed: showing diffuse circumferential skin thickening, subcutaneous edema, and stranding which may be seen with cellulitis or edema within fluid overload. No organized collection or tract gas. No evidence of osteomyelitis.  Wound to Rt heel is partial thickness, granular and surrounding predominant hyperkeratotic tissue, improving size dimensions since last admission, no active discharge, no crepitus, no erythema, no malodor, no PTB, stable without gross SOI.   Wound to the posterior Left lower mid leg, with some serous drainage, no malodor, superficial in nature, stable without SOI.   WC: betadine with dry sterile dressing to Rt heel. Xeroform and Compressive dressings applied to LLE  Vascular reccs appreciated-->s/p Endarterectomy and angioplasty of right femoral artery (12/17/2024)  WBAT to BLE  Offload bilateral heels with two pillows under bilateral calfs or total CAIR boots to prevent further soft tissue damage as pt is has been predominately bedbound or sitting on a chair while placing pressure to heels.    No acute podiatric intervention warranted at this time. Continue with local wound care.   Pt to follow up at [facility] with Dr. Stovall for continued wound care treatment.   All additional care per Med appreciated  Patient demonstrated verbal understanding of all interventions and tolerated interventions well without any complications.       Case D/W with attending Dr. Stovall

## 2024-12-18 NOTE — PHYSICAL THERAPY INITIAL EVALUATION ADULT - PLANNED THERAPY INTERVENTIONS, PT EVAL
balance training/bed mobility training/gait training/neuromuscular re-education/strengthening/transfer training
bed mobility training/gait training/neuromuscular re-education/strengthening/transfer training

## 2024-12-18 NOTE — PHYSICAL THERAPY INITIAL EVALUATION ADULT - RANGE OF MOTION EXAMINATION, REHAB EVAL
B/L knees flex and Ankle DF/PF limited due to edema and pain/no ROM deficits were identified/deficits as listed below
B/L knees flex and Ankle DF/PF limited due to edema and pain/no ROM deficits were identified/deficits as listed below

## 2024-12-18 NOTE — PROGRESS NOTE ADULT - SUBJECTIVE AND OBJECTIVE BOX
12/18/24: Pt seen by podiatry team today with attending present for day LE wounds. Pt resting bedside, NAD. Now s/p vascular procedure. Pt states she was able to ambulate in the morning today, Pain is improved compared to before the procedure.     PMH: Diabetes mellitus type II, controlled    Atrial fibrillation    Congestive heart failure    Dyspnea    Morbid obesity with BMI of 40.0-44.9, adult    Neuropathy    Peripheral venous insufficiency    Thyroid nodule    HTN (hypertension)    Cellulitis    At risk for sleep apnea      PSH:History of esophagogastroduodenoscopy (EGD)    H/O colonoscopy    Other complications of gastric band procedure    S/P left knee arthroscopy    Status post medial meniscus repair    History of cholecystectomy    S/P cataract surgery        Allergies:latex (Unknown)  [This allergen will not trigger allergy alert] IV DYE, IODINE CONTRAST (Unknown)  PC Pen VK (Rash)  penicillin (Hives; Urticaria)      Labs:                        7.4    11.45 )-----------( 451      ( 18 Dec 2024 05:45 )             24.9   12-18    134[L]  |  103  |  24[H]  ----------------------------<  105[H]  4.3   |  27  |  0.70    Ca    8.0[L]      18 Dec 2024 05:45  Phos  3.7     12-18  Mg     2.1     12-18    TPro  6.6  /  Alb  2.5[L]  /  TBili  0.3  /  DBili  x   /  AST  15  /  ALT  11[L]  /  AlkPhos  65  12-18      Vital Signs Last 24 Hrs  T(C): 36.8 (18 Dec 2024 08:33), Max: 36.8 (18 Dec 2024 08:33)  T(F): 98.2 (18 Dec 2024 08:33), Max: 98.2 (18 Dec 2024 08:33)  HR: 65 (18 Dec 2024 12:00) (61 - 68)  BP: 132/41 (18 Dec 2024 12:00) (86/42 - 137/59)  BP(mean): 67 (18 Dec 2024 12:00) (55 - 89)  RR: 17 (18 Dec 2024 12:00) (12 - 25)  SpO2: 98% (18 Dec 2024 12:00) (94% - 100%)    Parameters below as of 18 Dec 2024 05:22  Patient On (Oxygen Delivery Method): room air      REVIEW OF SYSTEMS:    CONSTITUTIONAL: No weakness, fevers or chills  EYES: No visual changes  RESPIRATORY: No cough, wheezing; No shortness of breath  CARDIOVASCULAR: No chest pain or palpitations  GASTROINTESTINAL: No abdominal or epigastric pain. No nausea, vomiting; No diarrhea or constipation.   GENITOURINARY: No dysuria, frequency or hematuria  NEUROLOGICAL: No numbness or weakness  SKIN: See physical examination.  All other review of systems is negative unless indicated above    Physical Exam:   Constitutional: NAD, alert;  Lower Extremity Focus  Derm:  Skin warm, dry and supple bilateral.   Ulceration Right heel wound, partial thickness in nature, wound base granular wound size (approx 1.5 cm X 0.5cm X 0.1cm) and surrounding predominant hyperkeratotic tissue, no stephen-wound erythema/edema, no pus/purulence, no crepitus/fluctuance, no tracking/tunneling, no probe to bone.  Partial thickness ulceration noted to posterior medial Left lower midleg, some serous drainage, superficial wound bed, no malodor,  no crepitus/fluctuance, no tracking/tunneling, no PTB.   Vascular: Dorsalis Pedis and Posterior Tibial pulses weakly palpable  Neuro: Protective sensation intact to the level of the digits bilateral.  MSK: Muscle strength 5/5 all major muscle groups bilateral. Pt is minimally ambulatory       < from: CT Lower Extremity No Cont, Bilateral (12.05.24 @ 12:06) >    ACC: [id number] EXAM:  CT LWR EXT BI   ORDERED BY: LIBIA ALVARES     PROCEDURE DATE:  12/05/2024          INTERPRETATION:  CT OF THE bilateral lower extremities WITHOUT CONTRAST.    CLINICAL INFORMATION: Pain  TECHNIQUE: Axial CT images were obtained of the bilateral lower   extremities with coronal and sagittal reconstructions. No contrast was   administered. Three dimensional reconstructions were obtained.    COMPARISON: CT of the abdomen and pelvis performed 11/18/2024.    FINDINGS:    Bones: There is no acute fracture or dislocation. 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 The pubic symphysis is preserved. There is   moderate bilateral hip osteoarthrosis. Severe tricompartmental knee   osteoarthrosis with bone-on-bone apposition atthe medial compartments   bilaterally. Ring and arc type lesion within the distal tibia felt to be   consistent with benign chondroid lesion.    Soft tissues: Fat-containing umbilical hernia is present. Colonic   diverticula without evidence of diverticulitis. Atherosclerotic vascular   calcifications are present. Diffuse subcutaneous edema and stranding   throughout bilateral lower extremities with circumferential skin   thickening. There is no organized collection. There is no tracking gas.    IMPRESSION:    CT of bilateral lower extremities demonstrates diffuse circumferential   skin thickening, subcutaneous edema, and stranding which may be seen with   cellulitis or edema within fluid overload. No organized collection or   tract gas. No evidence of osteomyelitis.        --- End of Report ---    < end of copied text >

## 2024-12-18 NOTE — PROVIDER CONTACT NOTE (OTHER) - BACKGROUND
s/p endarectomy
s/p right lower extremity endarectomy 12/17.
Patient admitted for CHF exacerbation. History of afib, neuropathy, HTN, DM2, and obesity.
Patient admitted for CHF exacerbation. History of afib, neuropathy, HTN, DM2, and obesity.
Amiodarone dose doesn't have parameters. Metoprolol only has BP parameter that is not met by 1 mmHG.

## 2024-12-18 NOTE — PROVIDER CONTACT NOTE (OTHER) - SITUATION
Pt /44 HR 55 and has amiodarone and metoprolol ordered without parameters.
Dr Mayer was here this morning.  Please fax discharge papers to [phone].
BP 94/35 MAP 56 via right radial margot, 103/44 MAP 61 via left upper arm noninvasive blood pressure cuff
Patient with a temperature of 101.9.
diastolic blood pressure 32, MAP 61 via right radial margot
Patient nauseous, vomiting, lethargic. Desat to 78% on RA.

## 2024-12-19 ENCOUNTER — TRANSCRIPTION ENCOUNTER (OUTPATIENT)
Age: 74
End: 2024-12-19

## 2024-12-19 VITALS
DIASTOLIC BLOOD PRESSURE: 48 MMHG | RESPIRATION RATE: 19 BRPM | TEMPERATURE: 98 F | HEART RATE: 56 BPM | SYSTOLIC BLOOD PRESSURE: 102 MMHG | OXYGEN SATURATION: 96 %

## 2024-12-19 PROCEDURE — 99239 HOSP IP/OBS DSCHRG MGMT >30: CPT

## 2024-12-19 RX ORDER — OXYCODONE HYDROCHLORIDE 30 MG/1
1 TABLET ORAL
Qty: 15 | Refills: 0
Start: 2024-12-19 | End: 2024-12-23

## 2024-12-19 RX ORDER — DAPAGLIFLOZIN 10 MG/1
5 TABLET, FILM COATED ORAL DAILY
Refills: 0 | Status: DISCONTINUED | OUTPATIENT
Start: 2024-12-19 | End: 2024-12-19

## 2024-12-19 RX ORDER — ACETAMINOPHEN 500MG 500 MG/1
2 TABLET, COATED ORAL
Qty: 0 | Refills: 0 | DISCHARGE
Start: 2024-12-19

## 2024-12-19 RX ORDER — FUROSEMIDE 40 MG/1
1 TABLET ORAL
Qty: 0 | Refills: 0 | DISCHARGE
Start: 2024-12-19

## 2024-12-19 RX ORDER — DAPAGLIFLOZIN 10 MG/1
1 TABLET, FILM COATED ORAL
Qty: 30 | Refills: 0
Start: 2024-12-19 | End: 2025-01-17

## 2024-12-19 RX ORDER — CEPHALEXIN 500 MG
1 CAPSULE ORAL
Qty: 28 | Refills: 0
Start: 2024-12-19 | End: 2024-12-25

## 2024-12-19 RX ORDER — LANOLIN ALCOHOL/MO/W.PET/CERES
1 CREAM (GRAM) TOPICAL
Qty: 30 | Refills: 0
Start: 2024-12-19

## 2024-12-19 RX ORDER — CEPHALEXIN 500 MG
500 CAPSULE ORAL EVERY 6 HOURS
Refills: 0 | Status: DISCONTINUED | OUTPATIENT
Start: 2024-12-19 | End: 2024-12-19

## 2024-12-19 RX ADMIN — KETOROLAC TROMETHAMINE 15 MILLIGRAM(S): 30 INJECTION INTRAMUSCULAR; INTRAVENOUS at 11:13

## 2024-12-19 RX ADMIN — Medication 1 TABLET(S): at 11:12

## 2024-12-19 RX ADMIN — GABAPENTIN 800 MILLIGRAM(S): 300 CAPSULE ORAL at 06:12

## 2024-12-19 RX ADMIN — AMIODARONE HYDROCHLORIDE 200 MILLIGRAM(S): 200 TABLET ORAL at 06:12

## 2024-12-19 RX ADMIN — FUROSEMIDE 40 MILLIGRAM(S): 40 TABLET ORAL at 11:12

## 2024-12-19 RX ADMIN — Medication 100 MILLIGRAM(S): at 11:12

## 2024-12-19 RX ADMIN — Medication 30 MILLILITER(S): at 11:11

## 2024-12-19 RX ADMIN — DILTIAZEM HYDROCHLORIDE 120 MILLIGRAM(S): 240 CAPSULE, COATED, EXTENDED RELEASE ORAL at 11:13

## 2024-12-19 RX ADMIN — ONDANSETRON HYDROCHLORIDE 4 MILLIGRAM(S): 4 TABLET, FILM COATED ORAL at 06:22

## 2024-12-19 RX ADMIN — Medication 500 MILLIGRAM(S): at 11:11

## 2024-12-19 RX ADMIN — Medication 1000 UNIT(S): at 11:12

## 2024-12-19 RX ADMIN — Medication 10 MILLIGRAM(S): at 11:13

## 2024-12-19 RX ADMIN — GABAPENTIN 800 MILLIGRAM(S): 300 CAPSULE ORAL at 15:03

## 2024-12-19 RX ADMIN — KETOROLAC TROMETHAMINE 15 MILLIGRAM(S): 30 INJECTION INTRAMUSCULAR; INTRAVENOUS at 06:11

## 2024-12-19 RX ADMIN — CLOPIDOGREL 75 MILLIGRAM(S): 75 TABLET, FILM COATED ORAL at 11:12

## 2024-12-19 RX ADMIN — METOPROLOL TARTRATE 25 MILLIGRAM(S): 100 TABLET, FILM COATED ORAL at 06:12

## 2024-12-19 RX ADMIN — Medication 25 MILLIGRAM(S): at 11:12

## 2024-12-19 NOTE — DISCHARGE NOTE PROVIDER - NSDCFUADDAPPT_GEN_ALL_CORE_FT
APPTS ARE READY TO BE MADE: [X] YES    Primary Care / Cardiology Dr. Mayer within 1 week  [facility] Dr. Stovall within 1 week  Vascular Surgery Dr. Manjarrez within 2 weeks APPTS ARE READY TO BE MADE: [X] YES    Primary Care / Cardiology Dr. Mayer within 1 week  [facility] Dr. Stovall within 1 week  Vascular Surgery Dr. Manjarrez on Monday 12/23; please call the office to establish a time for the appointment so your Prevena dressing can be removed. Please only sponge bath until this is removed.

## 2024-12-19 NOTE — DISCHARGE NOTE PROVIDER - NSDCHHNEEDSERVICEOTHER_GEN_ALL_CORE_FT
Wound Care: Betadine with dry sterile dressing to Rt heel. Xeroform and compressive dressings applied to LLE.   Activity: Weight bearing as tolerated to bilateral lower extremities  Additional: Offload bilateral heels with two pillows under bilateral calves or total CAIR boots when resting to prevent further soft tissue damage as placing pressure to heels is potentially damaging   Wound Care: Betadine with dry sterile dressing to Rt heel. Xeroform and compressive dressings applied to LLE.   Activity: Weight bearing as tolerated to bilateral lower extremities  Additional: Offload bilateral heels with two pillows under bilateral calves or total CAIR boots when resting to prevent further soft tissue damage as placing pressure to heels is potentially damaging    Has Prevena after vascular surgery, which will be removed in the office on Monday 12/23.

## 2024-12-19 NOTE — DISCHARGE NOTE PROVIDER - PROVIDER TOKENS
PROVIDER:[TOKEN:[[id number]],FOLLOWUP:[1 week]],FREE:[LAST:[Wilman],FIRST:[Zay],PHONE:[phone]],FAX:[(   )    -],ADDRESS:[[facility] [address], [facility] [address]  [place] [address]],FOLLOWUP:[1 week]],PROVIDER:[TOKEN:[[id number]],FOLLOWUP:[2 weeks]] PROVIDER:[TOKEN:[[id number]],FOLLOWUP:[1 week]],PROVIDER:[TOKEN:[[id number]],SCHEDULEDAPPT:[12/23/2024]],FREE:[LAST:[Wilman],FIRST:[Zay],PHONE:[phone]],FAX:[(   )    -],ADDRESS:[[address], [address]  [place] [address]],FOLLOWUP:[1 week]]

## 2024-12-19 NOTE — DISCHARGE NOTE PROVIDER - NSDCHHNEEDSERVICE_GEN_ALL_CORE
OA colon was scheduled on  Scheduled   on 8-23-24 .I mailed colonoscopy instructions to pt.  Pt denies rectal bleeding, diarrhea and constipation. Denies family hx of colon cancer. Last colonoscopy on 2/26/19 . Has hx of colon polyps,     Medication teaching and assessment/Observation and assessment/Rehabilitation services/Teaching and training/Wound care and assessment/Other, specify...

## 2024-12-19 NOTE — DISCHARGE NOTE PROVIDER - NSDCFUADDINST_GEN_ALL_CORE_FT
Wound Care: Betadine with dry sterile dressing to Rt heel. Xeroform and compressive dressings applied to LLE.   Activity: Weight bearing as tolerated to bilateral lower extremities  Additional: Offload bilateral heels with two pillows under bilateral calves or total CAIR boots when resting to prevent further soft tissue damage as placing pressure to heels is potentially damaging

## 2024-12-19 NOTE — DISCHARGE NOTE PROVIDER - HOSPITAL COURSE
74 year-old woman with diet controlled DM, HLD, chronic diastolic CHF, hx of TAVR, chronic afib on DOAC, PAD, PVD, chronic LE edema, presented for evaluation of worsening B/L lower extremity swelling, shortness of breath, worse with exertion, orthopnea, found to have sinus bradycardia, hypervolemic hyponatremia, CXR with B/L pleural effusions and pulmonary vascular congestion, also possible B/L LE cellulitis. Admitted to Medicine.     Acute on chronic diastolic CHF  No angina. Troponin negative. No sign of acute coronary syndrome. Exam and CXR consistent with volume overload. TTE done. LV cavity moderately dilated, normal wall thickness, normal LVEF, severe (grade 3) LV diastolic dysfunction, severe LA dilatation, mod to moderate RA dilatation, normal RV size and function. Appreciate Cardiology input. S/P diuresis with IV Lasix. Continue metoprolol ER, furosemide PO, spironolactone. Possible outpatient start of Farxiga post discharge. Follow up with Cardiology Dr Mayer upon discharge. Continue to monitor weight. Avoid added salt in diet.      Hx of Afib s/p ablation  Continue Cardizem CD and amiodarone. Continue oral anticoagulation for stroke risk reduction    HLD  Continue Zetia    DM  Diet control.     ESBL E.coli UTI, present upon admission  Completed course of meropenem while admitted    Rt heel partial thickness wound and Lt posterior lower leg partial thickness wound, unable to rule out cellulitis  S/P IV antibiotics, receiving wound care, wounds improved. Continue wound care. Follow up with  Wound Center with Dr. Stovall.     PAD, bilateral CFA stenosis  S/P endarterectomy of right common to superficial femoral artery, bovine patch angioplasty. Antegrade angiogram with shockwave lithotripsy of distal SFA/popliteal lesion, then DCB angioplasty. Tolerated procedure well. Continue wound care at [facility] with Dr. Stovall. Outpatient follow up with Vascular Surgery Dr. Manjarrez as well.     Hypervolemic hyponatremia  Improved with diuresis.     Moderate protein calorie malnutrition  Appreciate dietician input. Thiamine daily. MVI daily.      Physical deconditioning  Seen by PT, recommended home with home PT   74 year-old woman with diet controlled DM, HLD, chronic diastolic CHF, hx of TAVR, chronic afib on DOAC, PAD, PVD, chronic LE edema, presented for evaluation of worsening B/L lower extremity swelling, shortness of breath, worse with exertion, orthopnea, found to have sinus bradycardia, hypervolemic hyponatremia, CXR with B/L pleural effusions and pulmonary vascular congestion, also possible B/L LE cellulitis. Admitted to Medicine.     Acute on chronic diastolic CHF  No angina. Troponin negative. No sign of acute coronary syndrome. Exam and CXR consistent with volume overload. TTE done. LV cavity moderately dilated, normal wall thickness, normal LVEF, severe (grade 3) LV diastolic dysfunction, severe LA dilatation, mod to moderate RA dilatation, normal RV size and function. Appreciate Cardiology input. S/P diuresis with IV Lasix. Continue metoprolol ER, furosemide PO, spironolactone. Possible outpatient start of Farxiga post discharge. Follow up with Cardiology Dr Mayer upon discharge. Continue to monitor weight. Avoid added salt in diet.      Hx of Afib s/p ablation  Continue Cardizem CD and amiodarone. Continue oral anticoagulation for stroke risk reduction    HLD  Continue Zetia, statin    DM  Diet control.     ESBL E.coli UTI, present upon admission  Completed course of meropenem while admitted    Rt heel partial thickness wound and Lt posterior lower leg partial thickness wound, unable to rule out cellulitis  S/P IV antibiotics, receiving wound care, wounds improved. Continue wound care. Follow up with  Wound Center with Dr. Stovall.     PAD, bilateral CFA stenosis  S/P endarterectomy of right common to superficial femoral artery, bovine patch angioplasty. Antegrade angiogram with shockwave lithotripsy of distal SFA/popliteal lesion, then DCB angioplasty. Tolerated procedure well. Continue wound care at [facility] with Dr. Stovall. Outpatient follow up with Vascular Surgery Dr. Manjarrez as well. Continue statin, Plavix.     Hypervolemic hyponatremia  Improved with diuresis.     Moderate protein calorie malnutrition  Appreciate dietician input. Thiamine daily. MVI daily.      Physical deconditioning  Seen by PT, recommended home with home PT   74 year-old woman with diet controlled DM, HLD, chronic diastolic CHF, hx of TAVR, chronic afib on DOAC, PAD, PVD, chronic LE edema, presented for evaluation of worsening B/L lower extremity swelling, shortness of breath, worse with exertion, orthopnea, found to have sinus bradycardia, hypervolemic hyponatremia, CXR with B/L pleural effusions and pulmonary vascular congestion, also possible B/L LE cellulitis. Admitted to Medicine.     Acute on chronic diastolic CHF  No angina. Troponin negative. No sign of acute coronary syndrome. Exam and CXR consistent with volume overload. TTE done. LV cavity moderately dilated, normal wall thickness, normal LVEF, severe (grade 3) LV diastolic dysfunction, severe LA dilatation, mod to moderate RA dilatation, normal RV size and function. Appreciate Cardiology input. S/P diuresis with IV Lasix. Continue metoprolol ER, furosemide PO, spironolactone. Possible outpatient start of Farxiga post discharge. Follow up with Cardiology Dr Mayer upon discharge. Continue to monitor weight. Avoid added salt in diet.      Hx of Afib s/p ablation  Continue Cardizem CD and amiodarone. Continue oral anticoagulation for stroke risk reduction    HLD  Continue Zetia, statin    DM  Diet control.     ESBL E.coli UTI, present upon admission  Completed course of meropenem while admitted    Rt heel partial thickness wound and Lt posterior lower leg partial thickness wound, unable to rule out cellulitis  S/P IV antibiotics, receiving wound care, wounds improved. Continue wound care. Follow up with  Wound Center with Dr. Stovall.     PAD, bilateral CFA stenosis  S/P endarterectomy of right common to superficial femoral artery, bovine patch angioplasty. Antegrade angiogram with shockwave lithotripsy of distal SFA/popliteal lesion, then DCB angioplasty. Tolerated procedure well. Continue wound care at [facility] with Dr. Stovall. Outpatient follow up with Vascular Surgery Dr. Manjarrez as well. Continue statin, Plavix. Complete 1 week of Keflex on discharge.    Hypervolemic hyponatremia  Improved with diuresis.     Moderate protein calorie malnutrition  Appreciate dietician input. Thiamine daily. MVI daily.      Physical deconditioning  Seen by PT, recommended home with home PT

## 2024-12-19 NOTE — DISCHARGE NOTE PROVIDER - NSDCCAREPROVSEEN_GEN_ALL_CORE_FT
Nannette Tyler (Infectious Disease)  Yasmany Crawley (Critical Care Team)  Rachna Pearce (Infectious Disease)  Herman Batres (Medicine)  Justo Mayer (Cardiology)  Zay Stovall (Podiatry, Wound Care)  Buster Colin (Medicine)  Zay Alvarado (Medicine)  Caryl Manjarrez (Vascular Surgery)  Delbert Crum (Medicine)

## 2024-12-19 NOTE — PROGRESS NOTE ADULT - ASSESSMENT
74-year-old F, with PMH DM taken off metformin 1 month ago (after decreased HbA1C), TAVR 8/2024 on Plavix followed by a two month rehab stay, A-fib on Xarelto, CHF, HLD, PVD, lower extremity elephantiasis with varicose veins, who is admitted with CHF exacerbation. Vascular consulted for management of bilateral CFA stenoses as on arterial duplex. Femoral pulses asymmetric, no pedal pulses but doppler signals of DP/PT bilaterally present.  CTA aorta reviewed    now POD2 from Rt SFA endarterectomy, recovering well    Recommendations:  -Xeralto restarted  -PT recced home PT  -Pain control PRN  -Rest of care per primary team   -local wound care per podiatry  -Anticipated DC today      Plan will be discussed with Vascular surgery attending, Dr. Manjarrez

## 2024-12-19 NOTE — DISCHARGE NOTE PROVIDER - CARE PROVIDER_API CALL
Justo Mayer  Cardiovascular Disease  [address], [address]  [place] [address]  Phone: [phone]  Fax: [phone]  Follow Up Time: 1 week    [facility]  [address]  [facility] [address]  Phone: [phone]  Fax: (   )    -  Follow Up Time: 1 week    Caryl Manjarrez  Vascular Surgery  [address], [address]  [place] [address]  Phone: [phone]  Fax: [phone]  Follow Up Time: 2 weeks   Justo Mayer  Cardiovascular Disease  [address], [address]  [place] [address]  Phone: [phone]  Fax: [phone]  Follow Up Time: 1 week    Caryl Manjarrez  Vascular Surgery  [address], [address]  [place] [address]  Phone: [phone]  Fax: [phone]  Scheduled Appointment: 12/23/2024    Wilman [facility]  [address], [address]  [place] [address]  Phone: [phone]  Fax: (   )    -  Follow Up Time: 1 week

## 2024-12-19 NOTE — PROGRESS NOTE ADULT - PROVIDER SPECIALTY LIST ADULT
Cardiology
Hospitalist
Infectious Disease
Vascular Surgery
Hospitalist
Podiatry
Hospitalist
Infectious Disease
Podiatry
Vascular Surgery
Hospitalist
Vascular Surgery
Vascular Surgery

## 2024-12-19 NOTE — DISCHARGE NOTE PROVIDER - NSDCCPCAREPLAN_GEN_ALL_CORE_FT
PRINCIPAL DISCHARGE DIAGNOSIS  Diagnosis: Acute exacerbation of CHF (congestive heart failure)  Assessment and Plan of Treatment: You were admitted to the hospital for worsening shortness of breath, leg swelling, found to have an acute exacerbation congestive heart failure (CHF). No angina. Troponin cardiac enzymes negative. NO sign of acute coronary syndrome (unstable angina, heart attack). Exam and chest xray was consistent with volume overload. Echocardiogram was performed and showed significant left ventricular diastolic dysfunction and left atrial dilation indicative of the CHF. You were seen and evaluated by Cardiology, treated in intravenous diuretics and improved thereafter. Now doing well. Stable for discharge home. Continue metoprolol ER, furosemide, spironolactone. Possible outpatient start of Farxiga post discharge. Follow up with Cardiology Dr Mayer upon discharge. Continue to monitor weight. Avoid added salt in diet.      SECONDARY DISCHARGE DIAGNOSES  Diagnosis: History of atrial fibrillation  Assessment and Plan of Treatment: Continue diltiazem CD and amiodarone. Continue oral anticoagulation for stroke risk reduction    Diagnosis: HLD (hyperlipidemia)  Assessment and Plan of Treatment: Continue Zetia    Diagnosis: Diabetes mellitus  Assessment and Plan of Treatment: Continue diet control.    Diagnosis: Urinary tract infection  Assessment and Plan of Treatment: You were diagnosed with and treated for a urinary tract infection this admission, completing the course of antibiotics while admitted here.    Diagnosis: Multiple open wounds of lower extremity  Assessment and Plan of Treatment: You have right heel partial thickness wound and left posterior lower leg partial thickness wound, unable to rule out cellulitis. You were treated with intravenous antibiotics and wound care with improvement. Vascular procedure was performed (see below) to improve your arterial circulation and aid in wound healing. Wounds improved. Continue wound care. Follow up with [facility] with Dr. Stovall.    Diagnosis: Peripheral arterial disease  Assessment and Plan of Treatment: Regarding your peripheral arterial disease, this admission you were noted to have common femoral artery stenosis for which you underwent Vascular Surgery procedures, specifically, endarterectomy of right common to superficial femoral artery, bovine patch angioplasty, antegrade angiogram with shockwave lithotripsy of distal superficial femoral artery / popliteal lesion, then drug-coated balloon angioplasty. Tolerated procedure well. Continue wound care at [facility] with Dr. Stovall. Outpatient follow up with Vascular Surgery Dr. Manjarrez as well.    Diagnosis: Hyponatremia  Assessment and Plan of Treatment: You were noted upon admission to have a low sodium concentration in the blood, due to hypervolemia from congestive heart failure. With diuresis, sodium levels improved. Continue to monitor outpatient with Dr Mayer.    Diagnosis: Malnutrition  Assessment and Plan of Treatment: Appreciate dietician input. Continue thiamine daily, multivitamin daily.    Diagnosis: Physical deconditioning  Assessment and Plan of Treatment: Seen by Physical Therapy, recommended home with home physical therapy. Home care referral made.     PRINCIPAL DISCHARGE DIAGNOSIS  Diagnosis: Acute exacerbation of CHF (congestive heart failure)  Assessment and Plan of Treatment: You were admitted to the hospital for worsening shortness of breath, leg swelling, found to have an acute exacerbation congestive heart failure (CHF). No angina. Troponin cardiac enzymes negative. NO sign of acute coronary syndrome (unstable angina, heart attack). Exam and chest xray was consistent with volume overload. Echocardiogram was performed and showed significant left ventricular diastolic dysfunction and left atrial dilation indicative of the CHF. You were seen and evaluated by Cardiology, treated in intravenous diuretics and improved thereafter. Now doing well. Stable for discharge home. Continue metoprolol ER, furosemide, spironolactone. Possible outpatient start of Farxiga post discharge. Follow up with Cardiology Dr Mayer upon discharge. Continue to monitor weight. Avoid added salt in diet.      SECONDARY DISCHARGE DIAGNOSES  Diagnosis: History of atrial fibrillation  Assessment and Plan of Treatment: Continue diltiazem CD and amiodarone. Continue oral anticoagulation for stroke risk reduction    Diagnosis: HLD (hyperlipidemia)  Assessment and Plan of Treatment: Continue Zetia, statin    Diagnosis: Diabetes mellitus  Assessment and Plan of Treatment: Continue diet control.    Diagnosis: Urinary tract infection  Assessment and Plan of Treatment: You were diagnosed with and treated for a urinary tract infection this admission, completing the course of antibiotics while admitted here.    Diagnosis: Multiple open wounds of lower extremity  Assessment and Plan of Treatment: You have right heel partial thickness wound and left posterior lower leg partial thickness wound, unable to rule out cellulitis. You were treated with intravenous antibiotics and wound care with improvement. Vascular procedure was performed (see below) to improve your arterial circulation and aid in wound healing. Wounds improved. Continue wound care. Follow up with [facility] with Dr. Stovall.    Diagnosis: Peripheral arterial disease  Assessment and Plan of Treatment: Regarding your peripheral arterial disease, this admission you were noted to have common femoral artery stenosis for which you underwent Vascular Surgery procedures, specifically, endarterectomy of right common to superficial femoral artery, bovine patch angioplasty, antegrade angiogram with shockwave lithotripsy of distal superficial femoral artery / popliteal lesion, then drug-coated balloon angioplasty. Tolerated procedure well. Continune statin and Plavix. Continue wound care at [facility] with Dr. Stovall. Outpatient follow up with Vascular Surgery Dr. Manjarrez as well.    Diagnosis: Hyponatremia  Assessment and Plan of Treatment: You were noted upon admission to have a low sodium concentration in the blood, due to hypervolemia from congestive heart failure. With diuresis, sodium levels improved. Continue to monitor outpatient with Dr Mayer.    Diagnosis: Malnutrition  Assessment and Plan of Treatment: Appreciate dietician input. Continue thiamine daily, multivitamin daily.    Diagnosis: Physical deconditioning  Assessment and Plan of Treatment: Seen by Physical Therapy, recommended home with home physical therapy. Home care referral made.

## 2024-12-19 NOTE — DISCHARGE NOTE PROVIDER - DETAILS OF MALNUTRITION DIAGNOSIS/DIAGNOSES
This patient has been assessed with a concern for Malnutrition and was treated during this hospitalization for the following Nutrition diagnosis/diagnoses:     -  12/05/2024: Moderate protein-calorie malnutrition

## 2024-12-19 NOTE — PROGRESS NOTE ADULT - SUBJECTIVE AND OBJECTIVE BOX
Patient seen and examined at the bedside.   Pain well controlled, still present to the right foot and left leg  Prevena in place holding suction  PT rec'ed home PT  no other complains at this time           PHYSICAL EXAM   GENERAL: NAD, well developed, obese  HEAD: Atraumatic, normocephalic  EYES: EOMI, PERRLA, conjunctiva and sclera clear  ENT: moist mucous membrane  NECK: supple, No JVD, midline trachea  CHEST/LUNG: No increased WOB, symmetric excursions  Heart: RRR ppp, no peripheral edema  ABDOMEN: round, soft, nondistended, nontender. right groin dressing c/d/i & w/o ecchymosis/hematoma  EXTREMITIES: +2 left fem, 1+ right fem pulses, doppler signals of b/l DP biphasic and b/l PT monophasic (weaker on right), dressing of right heel & left calf present  NERVOUS SYSTEM: AOx4, speech clear, no neuro-deficits  MSK: full ROM, no deformities  SKIN: warm to touch, no rash or lesions      Chief complaint:      PMHx:  Diabetes mellitus type II, controlled    Atrial fibrillation    Congestive heart failure    Dyspnea    Morbid obesity with BMI of 40.0-44.9, adult    Neuropathy    Peripheral venous insufficiency    Thyroid nodule    HTN (hypertension)    Cellulitis    At risk for sleep apnea        PSHx:  History of esophagogastroduodenoscopy (EGD)    H/O colonoscopy    Other complications of gastric band procedure    S/P left knee arthroscopy    Status post medial meniscus repair    History of cholecystectomy    S/P cataract surgery        FHx:  Family history of breast cancer in mother    Family history of diabetes mellitus in mother    FHx: heart disease (Sibling)        Vitals:  T(C): 36.7 (12-17 @ 02:55), Max: 36.8 (12-16 @ 09:30)  HR: 58 (12-17 @ 02:55) (56 - 61)  BP: 125/46 (12-17 @ 02:55) (106/40 - 125/52)  RR: 18 (12-17 @ 02:55) (18 - 18)  SpO2: 95% (12-17 @ 02:55) (95% - 100%)      I&Os    12-16 @ 07:01  -  12-17 @ 04:07  --------------------------------------------------------  IN:    Oral Fluid: 500 mL  Total IN: 500 mL    OUT:    Voided (mL): 550 mL  Total OUT: 550 mL    Total NET: -50 mL        .    Labs:  12-16 @ 06:07                    8.0  CBC: 7.19>)-------(<466                     27.7                 133 | 100 | 26    CMP:  ----------------------< 95               4.0 | 29 | 0.89                      Ca:8.7  Phos:-  Mg:-               -|      |-        LFTs:  ------|-|-----             -|      |-        Cultures:        Current Inpatient Medications:  acetaminophen     Tablet .. 650 milliGRAM(s) Oral every 6 hours PRN  aluminum hydroxide/magnesium hydroxide/simethicone Suspension 30 milliLiter(s) Oral every 4 hours PRN  aMIOdarone    Tablet 200 milliGRAM(s) Oral daily  atorvastatin 10 milliGRAM(s) Oral at bedtime  cholecalciferol 1000 Unit(s) Oral daily  clopidogrel Tablet 75 milliGRAM(s) Oral daily  cyclobenzaprine 10 milliGRAM(s) Oral three times a day PRN  diltiazem    milliGRAM(s) Oral daily  ezetimibe 10 milliGRAM(s) Oral daily  furosemide    Tablet 40 milliGRAM(s) Oral daily  gabapentin 800 milliGRAM(s) Oral three times a day  HYDROmorphone  Injectable 0.5 milliGRAM(s) IV Push once  influenza  Vaccine (HIGH DOSE) 0.5 milliLiter(s) IntraMuscular once  lactobacillus acidophilus 1 Tablet(s) Oral daily  melatonin 3 milliGRAM(s) Oral at bedtime PRN  metoprolol succinate ER 25 milliGRAM(s) Oral daily  montelukast 10 milliGRAM(s) Oral at bedtime  naloxone Injectable 0.4 milliGRAM(s) IV Push once PRN  ondansetron Injectable 4 milliGRAM(s) IV Push every 8 hours PRN  oxycodone    5 mG/acetaminophen 325 mG 1 Tablet(s) Oral every 6 hours PRN  spironolactone 25 milliGRAM(s) Oral daily  zolpidem 5 milliGRAM(s) Oral at bedtime PRN

## 2024-12-19 NOTE — DISCHARGE NOTE PROVIDER - REASON FOR ADMISSION
CHF exacerbation, hypervolemic hyponatremia, urinary tract infection, lower extremity wounds with possible cellulitis

## 2024-12-19 NOTE — DISCHARGE NOTE PROVIDER - NSDCMRMEDTOKEN_GEN_ALL_CORE_FT
Acidophilus oral tablet: 1 tab(s) orally once a day  amiodarone 200 mg oral tablet: 1 tab(s) orally once a day  Cardizem  mg/24 hours oral capsule, extended release: 1 cap(s) orally once a day  clopidogrel 75 mg oral tablet: 1 tab(s) orally once a day  ezetimibe 10 mg oral tablet: 1 tab(s) orally once a day (in the evening)  furosemide 40 mg oral tablet: 1 tab(s) orally once a day  gabapentin 800 mg oral tablet: 1 tab(s) orally 3 times a day  metoprolol succinate 25 mg oral tablet, extended release: 1 tab(s) orally once a day  montelukast 10 mg oral tablet: 1 tab(s) orally once a day (at bedtime)  Multiple Vitamins with Minerals oral tablet: 1 tab(s) orally once a day  oxyCODONE 5 mg oral tablet: 1 tab(s) orally every 8 hours as needed for Severe Pain (7 - 10) May take 1/2 tab for moderate pain MDD: 15mg  pravastatin 10 mg oral tablet: 1 tab(s) orally once a day (in the evening)  spironolactone 25 mg oral tablet: 1 tab(s) orally once a day  thiamine 100 mg oral tablet: 1 tab(s) orally once a day  Tylenol 325 mg oral tablet: 2 tab(s) orally every 6 hours As needed Temp greater or equal to 38C (100.4F), Mild Pain (1 - 3)  Vitamin D3 25 mcg (1000 intl units) oral capsule: 1 cap(s) orally once a day  Xarelto 20 mg oral tablet: 1 tab(s) orally once a day  zolpidem 10 mg oral tablet: 1 tab(s) orally once a day (at bedtime) as needed MDD: 1   Acidophilus oral tablet: 1 tab(s) orally once a day  amiodarone 200 mg oral tablet: 1 tab(s) orally once a day  Cardizem  mg/24 hours oral capsule, extended release: 1 cap(s) orally once a day  cephalexin 500 mg oral capsule: 1 cap(s) orally every 6 hours  clopidogrel 75 mg oral tablet: 1 tab(s) orally once a day  ezetimibe 10 mg oral tablet: 1 tab(s) orally once a day (in the evening)  furosemide 40 mg oral tablet: 1 tab(s) orally once a day  gabapentin 800 mg oral tablet: 1 tab(s) orally 3 times a day  metoprolol succinate 25 mg oral tablet, extended release: 1 tab(s) orally once a day  montelukast 10 mg oral tablet: 1 tab(s) orally once a day (at bedtime)  Multiple Vitamins with Minerals oral tablet: 1 tab(s) orally once a day  oxyCODONE 5 mg oral tablet: 1 tab(s) orally every 8 hours as needed for Severe Pain (7 - 10) May take 1/2 tab for moderate pain MDD: 15mg  pravastatin 10 mg oral tablet: 1 tab(s) orally once a day (in the evening)  spironolactone 25 mg oral tablet: 1 tab(s) orally once a day  thiamine 100 mg oral tablet: 1 tab(s) orally once a day  Tylenol 325 mg oral tablet: 2 tab(s) orally every 6 hours As needed Temp greater or equal to 38C (100.4F), Mild Pain (1 - 3)  Vitamin D3 25 mcg (1000 intl units) oral capsule: 1 cap(s) orally once a day  Xarelto 20 mg oral tablet: 1 tab(s) orally once a day  zolpidem 10 mg oral tablet: 1 tab(s) orally once a day (at bedtime) as needed MDD: 1

## 2024-12-23 ENCOUNTER — APPOINTMENT (OUTPATIENT)
Dept: VASCULAR SURGERY | Facility: CLINIC | Age: 74
End: 2024-12-23
Payer: MEDICARE

## 2024-12-23 VITALS
RESPIRATION RATE: 18 BRPM | OXYGEN SATURATION: 96 % | SYSTOLIC BLOOD PRESSURE: 131 MMHG | HEART RATE: 84 BPM | DIASTOLIC BLOOD PRESSURE: 70 MMHG

## 2024-12-23 DIAGNOSIS — I73.9 PERIPHERAL VASCULAR DISEASE, UNSPECIFIED: ICD-10-CM

## 2024-12-23 DIAGNOSIS — L97.409 NON-PRESSURE CHRONIC ULCER OF UNSPECIFIED HEEL AND MIDFOOT WITH UNSPECIFIED SEVERITY: ICD-10-CM

## 2024-12-23 LAB — SURGICAL PATHOLOGY STUDY: SIGNIFICANT CHANGE UP

## 2024-12-23 PROCEDURE — 99024 POSTOP FOLLOW-UP VISIT: CPT

## 2024-12-30 ENCOUNTER — INPATIENT (INPATIENT)
Facility: HOSPITAL | Age: 74
LOS: 0 days | Discharge: HOME | DRG: 863 | End: 2024-12-31
Attending: STUDENT IN AN ORGANIZED HEALTH CARE EDUCATION/TRAINING PROGRAM | Admitting: STUDENT IN AN ORGANIZED HEALTH CARE EDUCATION/TRAINING PROGRAM
Payer: MEDICARE

## 2024-12-30 VITALS
WEIGHT: 225.09 LBS | SYSTOLIC BLOOD PRESSURE: 137 MMHG | HEIGHT: 70 IN | DIASTOLIC BLOOD PRESSURE: 54 MMHG | HEART RATE: 50 BPM | TEMPERATURE: 98 F | RESPIRATION RATE: 18 BRPM | OXYGEN SATURATION: 99 %

## 2024-12-30 DIAGNOSIS — Z98.890 OTHER SPECIFIED POSTPROCEDURAL STATES: Chronic | ICD-10-CM

## 2024-12-30 DIAGNOSIS — K95.09 OTHER COMPLICATIONS OF GASTRIC BAND PROCEDURE: Chronic | ICD-10-CM

## 2024-12-30 DIAGNOSIS — T81.49XA INFECTION FOLLOWING A PROCEDURE, OTHER SURGICAL SITE, INITIAL ENCOUNTER: ICD-10-CM

## 2024-12-30 DIAGNOSIS — Z98.49 CATARACT EXTRACTION STATUS, UNSPECIFIED EYE: Chronic | ICD-10-CM

## 2024-12-30 DIAGNOSIS — Z90.49 ACQUIRED ABSENCE OF OTHER SPECIFIED PARTS OF DIGESTIVE TRACT: Chronic | ICD-10-CM

## 2024-12-30 LAB
ANION GAP SERPL CALC-SCNC: 13 MMOL/L — SIGNIFICANT CHANGE UP (ref 5–17)
APTT BLD: 40.8 SEC — HIGH (ref 24.5–35.6)
BLD GP AB SCN SERPL QL: SIGNIFICANT CHANGE UP
BUN SERPL-MCNC: 16.9 MG/DL — SIGNIFICANT CHANGE UP (ref 8–20)
CALCIUM SERPL-MCNC: 9 MG/DL — SIGNIFICANT CHANGE UP (ref 8.4–10.5)
CHLORIDE SERPL-SCNC: 103 MMOL/L — SIGNIFICANT CHANGE UP (ref 96–108)
CO2 SERPL-SCNC: 23 MMOL/L — SIGNIFICANT CHANGE UP (ref 22–29)
CREAT SERPL-MCNC: 0.79 MG/DL — SIGNIFICANT CHANGE UP (ref 0.5–1.3)
EGFR: 78 ML/MIN/1.73M2 — SIGNIFICANT CHANGE UP
GAS PNL BLDV: SIGNIFICANT CHANGE UP
GLUCOSE SERPL-MCNC: 94 MG/DL — SIGNIFICANT CHANGE UP (ref 70–99)
HCT VFR BLD CALC: 28 % — LOW (ref 34.5–45)
HGB BLD-MCNC: 8.3 G/DL — LOW (ref 11.5–15.5)
INR BLD: 2.47 RATIO — HIGH (ref 0.85–1.16)
MAGNESIUM SERPL-MCNC: 1.8 MG/DL — SIGNIFICANT CHANGE UP (ref 1.6–2.6)
MCHC RBC-ENTMCNC: 24.7 PG — LOW (ref 27–34)
MCHC RBC-ENTMCNC: 29.6 G/DL — LOW (ref 32–36)
MCV RBC AUTO: 83.3 FL — SIGNIFICANT CHANGE UP (ref 80–100)
PHOSPHATE SERPL-MCNC: 4 MG/DL — SIGNIFICANT CHANGE UP (ref 2.4–4.7)
PLATELET # BLD AUTO: 386 K/UL — SIGNIFICANT CHANGE UP (ref 150–400)
POTASSIUM SERPL-MCNC: 4.3 MMOL/L — SIGNIFICANT CHANGE UP (ref 3.5–5.3)
POTASSIUM SERPL-SCNC: 4.3 MMOL/L — SIGNIFICANT CHANGE UP (ref 3.5–5.3)
PROTHROM AB SERPL-ACNC: 27.7 SEC — HIGH (ref 9.9–13.4)
RBC # BLD: 3.36 M/UL — LOW (ref 3.8–5.2)
RBC # FLD: 17.1 % — HIGH (ref 10.3–14.5)
SODIUM SERPL-SCNC: 139 MMOL/L — SIGNIFICANT CHANGE UP (ref 135–145)
WBC # BLD: 8.64 K/UL — SIGNIFICANT CHANGE UP (ref 3.8–10.5)
WBC # FLD AUTO: 8.64 K/UL — SIGNIFICANT CHANGE UP (ref 3.8–10.5)

## 2024-12-30 PROCEDURE — 99285 EMERGENCY DEPT VISIT HI MDM: CPT

## 2024-12-30 PROCEDURE — 36410 VNPNXR 3YR/> PHY/QHP DX/THER: CPT

## 2024-12-30 PROCEDURE — 93010 ELECTROCARDIOGRAM REPORT: CPT

## 2024-12-30 RX ORDER — METOPROLOL TARTRATE 50 MG
25 TABLET ORAL DAILY
Refills: 0 | Status: DISCONTINUED | OUTPATIENT
Start: 2024-12-30 | End: 2024-12-31

## 2024-12-30 RX ORDER — SPIRONOLACTONE 50 MG/1
25 TABLET ORAL DAILY
Refills: 0 | Status: DISCONTINUED | OUTPATIENT
Start: 2024-12-30 | End: 2024-12-31

## 2024-12-30 RX ORDER — MORPHINE SULFATE 15 MG
4 TABLET, EXTENDED RELEASE ORAL ONCE
Refills: 0 | Status: DISCONTINUED | OUTPATIENT
Start: 2024-12-30 | End: 2024-12-30

## 2024-12-30 RX ORDER — IBUPROFEN 200 MG
600 TABLET ORAL EVERY 6 HOURS
Refills: 0 | Status: DISCONTINUED | OUTPATIENT
Start: 2024-12-30 | End: 2024-12-31

## 2024-12-30 RX ORDER — SODIUM CHLORIDE 9 MG/ML
1000 INJECTION, SOLUTION INTRAVENOUS
Refills: 0 | Status: DISCONTINUED | OUTPATIENT
Start: 2024-12-30 | End: 2024-12-31

## 2024-12-30 RX ORDER — ACETAMINOPHEN 80 MG/.8ML
650 SOLUTION/ DROPS ORAL EVERY 6 HOURS
Refills: 0 | Status: DISCONTINUED | OUTPATIENT
Start: 2024-12-30 | End: 2024-12-31

## 2024-12-30 RX ORDER — VANCOMYCIN HYDROCHLORIDE 5 G/100ML
1250 INJECTION, POWDER, LYOPHILIZED, FOR SOLUTION INTRAVENOUS EVERY 12 HOURS
Refills: 0 | Status: DISCONTINUED | OUTPATIENT
Start: 2024-12-30 | End: 2024-12-31

## 2024-12-30 RX ORDER — GABAPENTIN 300 MG/1
800 CAPSULE ORAL THREE TIMES A DAY
Refills: 0 | Status: DISCONTINUED | OUTPATIENT
Start: 2024-12-30 | End: 2024-12-31

## 2024-12-30 RX ORDER — OXYCODONE HCL 15 MG
5 TABLET ORAL EVERY 6 HOURS
Refills: 0 | Status: DISCONTINUED | OUTPATIENT
Start: 2024-12-30 | End: 2024-12-31

## 2024-12-30 RX ORDER — AMIODARONE HYDROCHLORIDE 200 MG/1
200 TABLET ORAL DAILY
Refills: 0 | Status: DISCONTINUED | OUTPATIENT
Start: 2024-12-30 | End: 2024-12-31

## 2024-12-30 RX ORDER — DILTIAZEM HYDROCHLORIDE 300 MG/1
120 CAPSULE, COATED, EXTENDED RELEASE ORAL DAILY
Refills: 0 | Status: DISCONTINUED | OUTPATIENT
Start: 2024-12-30 | End: 2024-12-31

## 2024-12-30 RX ORDER — OXYCODONE HCL 15 MG
10 TABLET ORAL EVERY 6 HOURS
Refills: 0 | Status: DISCONTINUED | OUTPATIENT
Start: 2024-12-30 | End: 2024-12-31

## 2024-12-30 RX ORDER — FUROSEMIDE 20 MG
40 TABLET ORAL DAILY
Refills: 0 | Status: DISCONTINUED | OUTPATIENT
Start: 2024-12-30 | End: 2024-12-31

## 2024-12-30 RX ORDER — VANCOMYCIN HYDROCHLORIDE 5 G/100ML
1000 INJECTION, POWDER, LYOPHILIZED, FOR SOLUTION INTRAVENOUS EVERY 12 HOURS
Refills: 0 | Status: DISCONTINUED | OUTPATIENT
Start: 2024-12-30 | End: 2024-12-30

## 2024-12-30 RX ADMIN — SODIUM CHLORIDE 75 MILLILITER(S): 9 INJECTION, SOLUTION INTRAVENOUS at 21:52

## 2024-12-30 RX ADMIN — Medication 4 MILLIGRAM(S): at 21:52

## 2024-12-30 RX ADMIN — VANCOMYCIN HYDROCHLORIDE 166.67 MILLIGRAM(S): 5 INJECTION, POWDER, LYOPHILIZED, FOR SOLUTION INTRAVENOUS at 22:26

## 2024-12-30 RX ADMIN — Medication 2000 MILLIGRAM(S): at 22:26

## 2024-12-30 NOTE — ED PROVIDER NOTE - CLINICAL SUMMARY MEDICAL DECISION MAKING FREE TEXT BOX
74 year-old woman with diet controlled DM, HLD, chronic diastolic CHF, hx of TAVR, chronic afib on DOAC, PAD, PVD, chronic LE edema, Who is status post right femoral artery endarterectomy/angioplasty approximately 3 weeks ago with Dr. Manjarrez who presents now due to redness and increased pain to the surgical site.   Patient states she has been doing generally well but has noticed increasing pain, discharge and a slight odor to the surgical site.  Visiting nurse came to evaluate the patient today and was concerned due to infection.  She reached out to her surgeon who recommended she come to the hospital for further evaluation.  Patient denies nausea, fever, chills, trauma.    Case discussed with the surgical team whom request admission to Dr. Manjarrez's service.

## 2024-12-30 NOTE — ED ADULT NURSE NOTE - OBJECTIVE STATEMENT
36.6
Pt presents to ED c/o right groin abscess. S/p catheretization 12/19. AOx3, alert and calm. Airway patent RR even unlabored. Pt connected to cardiac monitor-SB HR 48 on room air.

## 2024-12-30 NOTE — H&P ADULT - ASSESSMENT
A:     Plan:   - OR today for R groin washout  A: Patient is a 74 year old female s/p Rt SFA endarterectomy on 12/17 now presenting with right groin infection.     Plan:   - OR tonight for right groin washout  A: Patient is a 74 year old female s/p Rt SFA endarterectomy on 12/17 now presenting with right groin infection.     Plan:   - local wound care  - IV abx  - labs and blood cultures  - Observe overnight

## 2024-12-30 NOTE — ED PROVIDER NOTE - OBJECTIVE STATEMENT
74 year-old woman with diet controlled DM, HLD, chronic diastolic CHF, hx of TAVR, chronic afib on DOAC, PAD, PVD, chronic LE edema, Who is status post right femoral artery endarterectomy/angioplasty approximately 3 weeks ago with Dr. Manjarrez who presents now due to redness and increased pain to the surgical site.   Patient states she has been doing generally well but has noticed increasing pain, discharge and a slight odor to the surgical site.  Visiting nurse came to evaluate the patient today and was concerned due to infection.  She reached out to her surgeon who recommended she come to the hospital for further evaluation.  Patient denies nausea, fever, chills, trauma.

## 2024-12-30 NOTE — H&P ADULT - NSHPPHYSICALEXAM_GEN_ALL_CORE
General appearance: NAD  Respiratory: mildly labored breathing  Gastrointestinal: ND  Vascular: 2x2 cm wound with surrounding erythema and pus, malodourous smell General appearance: NAD  Respiratory: mildly labored breathing  Gastrointestinal: ND  Extremities: moving all extremities spontaneously, bilateral heel offloading boots on feet  Vascular: 2x2 cm wound with surrounding erythema and pus, malodorous smell, small 1x1cm wound adjacent to right groin with pus and erythema.

## 2024-12-30 NOTE — ED ADULT TRIAGE NOTE - CHIEF COMPLAINT QUOTE
pt c/o abcess to right groin, had a catheterization 12/19  A&Ox3, resp wnl, Home Nurse stated site is abcessed, foul smelling, sent to r/o sepsis, to meet MD

## 2024-12-30 NOTE — ED ADULT NURSE NOTE - NSFALLHARMRISKINTERV_ED_ALL_ED

## 2024-12-30 NOTE — H&P ADULT - HISTORY OF PRESENT ILLNESS
Patient had visiting nurse come today and nurse noticed r groin wound infected. Patient denies fevers / chills. Endorses pus leaking from wound. Came into ED after nursing visit today.  74-year-old F, with PMH DM taken off metformin 2 months ago (after decreased HbA1C) ,  TAVR 8/2024 on Plavix followed by a two month rehab stay, A-fib on Xarelto, CHF, HLD, PVD, lower extremity elephantiasis with varicose veins who presents to the ED with a right groin infection. Patient had visiting nurse come today and nurse noticed right groin wound looked infected. Patient denies fevers / chills or bleeding from wound. Endorses pus leaking from wound.     Patient is s/p Rt SFA endarterectomy on 12/17

## 2024-12-30 NOTE — ED PROVIDER NOTE - CONSTITUTIONAL MENTATION
awake/alert/oriented to person, place, time/situation
Partially impaired: cannot see medication labels or newsprint, but can see obstacles in path, and the surrounding layout; can count fingers at arm's length/reading glasses

## 2024-12-31 ENCOUNTER — TRANSCRIPTION ENCOUNTER (OUTPATIENT)
Age: 74
End: 2024-12-31

## 2024-12-31 VITALS
SYSTOLIC BLOOD PRESSURE: 120 MMHG | OXYGEN SATURATION: 96 % | DIASTOLIC BLOOD PRESSURE: 66 MMHG | TEMPERATURE: 98 F | HEART RATE: 48 BPM | RESPIRATION RATE: 16 BRPM

## 2024-12-31 LAB
ALBUMIN SERPL ELPH-MCNC: 3.1 G/DL — LOW (ref 3.3–5.2)
ALP SERPL-CCNC: 91 U/L — SIGNIFICANT CHANGE UP (ref 40–120)
ALT FLD-CCNC: 7 U/L — SIGNIFICANT CHANGE UP
ANION GAP SERPL CALC-SCNC: 12 MMOL/L — SIGNIFICANT CHANGE UP (ref 5–17)
AST SERPL-CCNC: 17 U/L — SIGNIFICANT CHANGE UP
BASOPHILS # BLD AUTO: 0.04 K/UL — SIGNIFICANT CHANGE UP (ref 0–0.2)
BASOPHILS NFR BLD AUTO: 0.5 % — SIGNIFICANT CHANGE UP (ref 0–2)
BILIRUB SERPL-MCNC: 0.4 MG/DL — SIGNIFICANT CHANGE UP (ref 0.4–2)
BUN SERPL-MCNC: 16.7 MG/DL — SIGNIFICANT CHANGE UP (ref 8–20)
CALCIUM SERPL-MCNC: 8.5 MG/DL — SIGNIFICANT CHANGE UP (ref 8.4–10.5)
CHLORIDE SERPL-SCNC: 100 MMOL/L — SIGNIFICANT CHANGE UP (ref 96–108)
CO2 SERPL-SCNC: 22 MMOL/L — SIGNIFICANT CHANGE UP (ref 22–29)
CREAT SERPL-MCNC: 0.73 MG/DL — SIGNIFICANT CHANGE UP (ref 0.5–1.3)
EGFR: 86 ML/MIN/1.73M2 — SIGNIFICANT CHANGE UP
EOSINOPHIL # BLD AUTO: 0.24 K/UL — SIGNIFICANT CHANGE UP (ref 0–0.5)
EOSINOPHIL NFR BLD AUTO: 3.2 % — SIGNIFICANT CHANGE UP (ref 0–6)
GLUCOSE SERPL-MCNC: 92 MG/DL — SIGNIFICANT CHANGE UP (ref 70–99)
HCT VFR BLD CALC: 25.1 % — LOW (ref 34.5–45)
HGB BLD-MCNC: 7.4 G/DL — LOW (ref 11.5–15.5)
IMM GRANULOCYTES NFR BLD AUTO: 0.4 % — SIGNIFICANT CHANGE UP (ref 0–0.9)
LYMPHOCYTES # BLD AUTO: 0.49 K/UL — LOW (ref 1–3.3)
LYMPHOCYTES # BLD AUTO: 6.6 % — LOW (ref 13–44)
MAGNESIUM SERPL-MCNC: 1.5 MG/DL — LOW (ref 1.8–2.6)
MCHC RBC-ENTMCNC: 24.7 PG — LOW (ref 27–34)
MCHC RBC-ENTMCNC: 29.5 G/DL — LOW (ref 32–36)
MCV RBC AUTO: 83.9 FL — SIGNIFICANT CHANGE UP (ref 80–100)
MONOCYTES # BLD AUTO: 0.43 K/UL — SIGNIFICANT CHANGE UP (ref 0–0.9)
MONOCYTES NFR BLD AUTO: 5.8 % — SIGNIFICANT CHANGE UP (ref 2–14)
NEUTROPHILS # BLD AUTO: 6.19 K/UL — SIGNIFICANT CHANGE UP (ref 1.8–7.4)
NEUTROPHILS NFR BLD AUTO: 83.5 % — HIGH (ref 43–77)
PHOSPHATE SERPL-MCNC: 3.9 MG/DL — SIGNIFICANT CHANGE UP (ref 2.4–4.7)
PLATELET # BLD AUTO: 311 K/UL — SIGNIFICANT CHANGE UP (ref 150–400)
POTASSIUM SERPL-MCNC: 3.8 MMOL/L — SIGNIFICANT CHANGE UP (ref 3.5–5.3)
POTASSIUM SERPL-SCNC: 3.8 MMOL/L — SIGNIFICANT CHANGE UP (ref 3.5–5.3)
PROT SERPL-MCNC: 6.3 G/DL — LOW (ref 6.6–8.7)
RBC # BLD: 2.99 M/UL — LOW (ref 3.8–5.2)
RBC # FLD: 17.1 % — HIGH (ref 10.3–14.5)
SODIUM SERPL-SCNC: 134 MMOL/L — LOW (ref 135–145)
VANCOMYCIN TROUGH SERPL-MCNC: 9.8 UG/ML — LOW (ref 10–20)
WBC # BLD: 7.42 K/UL — SIGNIFICANT CHANGE UP (ref 3.8–10.5)
WBC # FLD AUTO: 7.42 K/UL — SIGNIFICANT CHANGE UP (ref 3.8–10.5)

## 2024-12-31 PROCEDURE — 82803 BLOOD GASES ANY COMBINATION: CPT

## 2024-12-31 PROCEDURE — 87040 BLOOD CULTURE FOR BACTERIA: CPT

## 2024-12-31 PROCEDURE — 85025 COMPLETE CBC W/AUTO DIFF WBC: CPT

## 2024-12-31 PROCEDURE — 10140 I&D HMTMA SEROMA/FLUID COLLJ: CPT

## 2024-12-31 PROCEDURE — 80202 ASSAY OF VANCOMYCIN: CPT

## 2024-12-31 PROCEDURE — 85027 COMPLETE CBC AUTOMATED: CPT

## 2024-12-31 PROCEDURE — 84100 ASSAY OF PHOSPHORUS: CPT

## 2024-12-31 PROCEDURE — 80048 BASIC METABOLIC PNL TOTAL CA: CPT

## 2024-12-31 PROCEDURE — 99285 EMERGENCY DEPT VISIT HI MDM: CPT | Mod: 25

## 2024-12-31 PROCEDURE — 86900 BLOOD TYPING SEROLOGIC ABO: CPT

## 2024-12-31 PROCEDURE — 83605 ASSAY OF LACTIC ACID: CPT

## 2024-12-31 PROCEDURE — 36415 COLL VENOUS BLD VENIPUNCTURE: CPT

## 2024-12-31 PROCEDURE — 86901 BLOOD TYPING SEROLOGIC RH(D): CPT

## 2024-12-31 PROCEDURE — 85730 THROMBOPLASTIN TIME PARTIAL: CPT

## 2024-12-31 PROCEDURE — 84295 ASSAY OF SERUM SODIUM: CPT

## 2024-12-31 PROCEDURE — 80053 COMPREHEN METABOLIC PANEL: CPT

## 2024-12-31 PROCEDURE — 82330 ASSAY OF CALCIUM: CPT

## 2024-12-31 PROCEDURE — 84132 ASSAY OF SERUM POTASSIUM: CPT

## 2024-12-31 PROCEDURE — 86850 RBC ANTIBODY SCREEN: CPT

## 2024-12-31 PROCEDURE — 36569 INSJ PICC 5 YR+ W/O IMAGING: CPT

## 2024-12-31 PROCEDURE — 85014 HEMATOCRIT: CPT

## 2024-12-31 PROCEDURE — C1751: CPT

## 2024-12-31 PROCEDURE — 85610 PROTHROMBIN TIME: CPT

## 2024-12-31 PROCEDURE — 82435 ASSAY OF BLOOD CHLORIDE: CPT

## 2024-12-31 PROCEDURE — 82947 ASSAY GLUCOSE BLOOD QUANT: CPT

## 2024-12-31 PROCEDURE — 85018 HEMOGLOBIN: CPT

## 2024-12-31 PROCEDURE — 83735 ASSAY OF MAGNESIUM: CPT

## 2024-12-31 PROCEDURE — 74177 CT ABD & PELVIS W/CONTRAST: CPT | Mod: MC

## 2024-12-31 PROCEDURE — 74177 CT ABD & PELVIS W/CONTRAST: CPT | Mod: 26

## 2024-12-31 PROCEDURE — 93005 ELECTROCARDIOGRAM TRACING: CPT

## 2024-12-31 RX ORDER — SULFAMETHOXAZOLE/TRIMETHOPRIM 800-160 MG
1 TABLET ORAL
Qty: 14 | Refills: 0
Start: 2024-12-31 | End: 2025-01-06

## 2024-12-31 RX ORDER — INFLUENZA A VIRUS A/WISCONSIN/588/2019 (H1N1) RECOMBINANT HEMAGGLUTININ ANTIGEN, INFLUENZA A VIRUS A/DARWIN/6/2021 (H3N2) RECOMBINANT HEMAGGLUTININ ANTIGEN, INFLUENZA B VIRUS B/AUSTRIA/1359417/2021 RECOMBINANT HEMAGGLUTININ ANTIGEN, AND INFLUENZA B VIRUS B/PHUKET/3073/2013 RECOMBINANT HEMAGGLUTININ ANTIGEN 45; 45; 45; 45 UG/.5ML; UG/.5ML; UG/.5ML; UG/.5ML
0.5 INJECTION INTRAMUSCULAR ONCE
Refills: 0 | Status: DISCONTINUED | OUTPATIENT
Start: 2024-12-31 | End: 2024-12-31

## 2024-12-31 RX ORDER — HEPARIN SODIUM 1000 [USP'U]/ML
8500 INJECTION, SOLUTION INTRAVENOUS; SUBCUTANEOUS EVERY 6 HOURS
Refills: 0 | Status: DISCONTINUED | OUTPATIENT
Start: 2024-12-31 | End: 2024-12-31

## 2024-12-31 RX ORDER — HEPARIN SODIUM 1000 [USP'U]/ML
INJECTION, SOLUTION INTRAVENOUS; SUBCUTANEOUS
Qty: 25000 | Refills: 0 | Status: DISCONTINUED | OUTPATIENT
Start: 2024-12-31 | End: 2024-12-31

## 2024-12-31 RX ORDER — HEPARIN SODIUM 1000 [USP'U]/ML
4000 INJECTION, SOLUTION INTRAVENOUS; SUBCUTANEOUS EVERY 6 HOURS
Refills: 0 | Status: DISCONTINUED | OUTPATIENT
Start: 2024-12-31 | End: 2024-12-31

## 2024-12-31 RX ADMIN — VANCOMYCIN HYDROCHLORIDE 166.67 MILLIGRAM(S): 5 INJECTION, POWDER, LYOPHILIZED, FOR SOLUTION INTRAVENOUS at 08:59

## 2024-12-31 RX ADMIN — ACETAMINOPHEN 650 MILLIGRAM(S): 80 SOLUTION/ DROPS ORAL at 00:05

## 2024-12-31 RX ADMIN — HEPARIN SODIUM 1800 UNIT(S)/HR: 1000 INJECTION, SOLUTION INTRAVENOUS; SUBCUTANEOUS at 08:02

## 2024-12-31 RX ADMIN — Medication 2000 MILLIGRAM(S): at 06:40

## 2024-12-31 RX ADMIN — Medication 40 MILLIGRAM(S): at 06:37

## 2024-12-31 RX ADMIN — DILTIAZEM HYDROCHLORIDE 120 MILLIGRAM(S): 300 CAPSULE, COATED, EXTENDED RELEASE ORAL at 06:37

## 2024-12-31 RX ADMIN — GABAPENTIN 800 MILLIGRAM(S): 300 CAPSULE ORAL at 06:38

## 2024-12-31 RX ADMIN — Medication 10 MILLIGRAM(S): at 06:39

## 2024-12-31 RX ADMIN — AMIODARONE HYDROCHLORIDE 200 MILLIGRAM(S): 200 TABLET ORAL at 06:37

## 2024-12-31 RX ADMIN — Medication 600 MILLIGRAM(S): at 12:25

## 2024-12-31 RX ADMIN — ACETAMINOPHEN 650 MILLIGRAM(S): 80 SOLUTION/ DROPS ORAL at 12:26

## 2024-12-31 RX ADMIN — SODIUM CHLORIDE 75 MILLILITER(S): 9 INJECTION, SOLUTION INTRAVENOUS at 08:59

## 2024-12-31 RX ADMIN — Medication 600 MILLIGRAM(S): at 00:07

## 2024-12-31 RX ADMIN — SPIRONOLACTONE 25 MILLIGRAM(S): 50 TABLET ORAL at 06:38

## 2024-12-31 RX ADMIN — Medication 10 MILLIGRAM(S): at 00:06

## 2024-12-31 NOTE — DISCHARGE NOTE PROVIDER - NSDCMRMEDTOKEN_GEN_ALL_CORE_FT
Acidophilus oral tablet: 1 tab(s) orally once a day  amiodarone 200 mg oral tablet: 1 tab(s) orally once a day  Bactrim  mg-160 mg oral tablet: 1 tab(s) orally 2 times a day  Cardizem  mg/24 hours oral capsule, extended release: 1 cap(s) orally once a day  cephalexin 500 mg oral capsule: 1 cap(s) orally every 6 hours  clopidogrel 75 mg oral tablet: 1 tab(s) orally once a day  dapagliflozin 5 mg oral tablet: 1 tab(s) orally once a day  ezetimibe 10 mg oral tablet: 1 tab(s) orally once a day (in the evening)  furosemide 40 mg oral tablet: 1 tab(s) orally once a day  gabapentin 800 mg oral tablet: 1 tab(s) orally 3 times a day  metoprolol succinate 25 mg oral tablet, extended release: 1 tab(s) orally once a day  montelukast 10 mg oral tablet: 1 tab(s) orally once a day (at bedtime)  Multiple Vitamins with Minerals oral tablet: 1 tab(s) orally once a day  oxyCODONE 5 mg oral tablet: 1 tab(s) orally every 8 hours as needed for Severe Pain (7 - 10) May take 1/2 tab for moderate pain MDD: 15mg  pravastatin 10 mg oral tablet: 1 tab(s) orally once a day (in the evening)  spironolactone 25 mg oral tablet: 1 tab(s) orally once a day  thiamine 100 mg oral tablet: 1 tab(s) orally once a day  Tylenol 325 mg oral tablet: 2 tab(s) orally every 6 hours As needed Temp greater or equal to 38C (100.4F), Mild Pain (1 - 3)  Vitamin D3 25 mcg (1000 intl units) oral capsule: 1 cap(s) orally once a day  Xarelto 20 mg oral tablet: 1 tab(s) orally once a day  zolpidem 10 mg oral tablet: 1 tab(s) orally once a day (at bedtime) as needed MDD: 1

## 2024-12-31 NOTE — DISCHARGE NOTE PROVIDER - HOSPITAL COURSE
Patient presented to the ED on 12/30/2024 for evaluation for possible right groin wound infection.  Patient is s/p right SFA endarterectomy on 12/17 by Dr Manjarrez.  The patient exhibited no signs of systemic infection. A CT scan was performed which exhibited a superfical collection.  The wound was probed and serous drainage resulted indicating a seroma. The patient was offered an operative washout by Dr Smith, but she was a bit reluctant to provide consent and preferred that Dr. Manjarrez intervene.    Currently the patient is stable for discharge to home and will be contacted by Dr Manjarrez's office to arrange elective intervention.  May restart xarelto and will be discharge with oral antibiotics

## 2024-12-31 NOTE — PATIENT PROFILE ADULT - FALL HARM RISK - HARM RISK INTERVENTIONS

## 2024-12-31 NOTE — DISCHARGE NOTE NURSING/CASE MANAGEMENT/SOCIAL WORK - FINANCIAL ASSISTANCE
[facility] provides services at a reduced cost to those who are determined to be eligible through [facility]’s financial assistance program. Information regarding [facility]’s financial assistance program can be found by going to [email or web address] or by calling [phone].

## 2024-12-31 NOTE — PATIENT PROFILE ADULT - FUNCTIONAL ASSESSMENT - DAILY ACTIVITY SECTION LABEL
PHYSICIAN NEXT STEPS:  Review Only    CHIEF COMPLAINT:  Chief Complaint/Protocol Used: Patient Declines Triage  Onset: 3 days ago      ASSESSMENT:  ? Onset: 3 days ago  -------------------------------------------------------    DISPOSITION:  Disposition Recommendation: Unspecified  Patient Directed To: Unspecified  Patient Intended Action: Seek care in the doctor's office         DISPOSITION OVERRIDE/PROVIDER CONSULT:  Disposition Override: N/A  Override Source: Unspecified  Consulted with PCP: No  Consulted with On-Call Physician: No    CALLER CONTACT INFO:  Name: Lauro Menon (Self)  Phone 1: [phone] (Home Phone) - Preferred  Phone 2: [phone] (Work Phone)      ENCOUNTER STARTED:  03/04/20 04:49:29 PM  ENCOUNTER ASSIGNED TO/CLOSED BY:  Haylee Griggs @ 03/04/20 05:27:54 PM      -------------------------------------------------------    UNDERSTANDS CARE ADVICE: Yes    AGREES WITH CARE ADVICE: Yes    WILL FOLLOW CARE ADVICE: Yes    -------------------------------------------------------  
.

## 2024-12-31 NOTE — DISCHARGE NOTE PROVIDER - CARE PROVIDER_API CALL
Caryl Manjarrez  Vascular Surgery  [address]  [place] [address]  Phone: [phone]  Fax: [phone]  Follow Up Time: 1-3 days  
Yes

## 2024-12-31 NOTE — DISCHARGE NOTE NURSING/CASE MANAGEMENT/SOCIAL WORK - PATIENT PORTAL LINK FT
You can access the FollowMyHealth Patient Portal offered by [facility] by registering at the following website: [email or web address]. By joining [facility]’s FollowMyHealth portal, you will also be able to view your health information using other applications (apps) compatible with our system.

## 2024-12-31 NOTE — PROGRESS NOTE ADULT - SUBJECTIVE AND OBJECTIVE BOX
Vascular Surgey follow up  brief PA note  Labs/vitals reviewed  CT scan reviewed  Patient evaluated bedside by Dr. Smith  Right groin dehiscence with underlying seroma  Operative washout offered, but patient reluctant to provide consent and would rather Dr. Manjarrez perform intervention  Above relayed to Dr. Manjarrez, who suggest that patient be discharged home  with antibiotics and she will schedule her for elective intervention towards the end of the week   -- patient to restart xarelto and will hold it on dr Manjarrez's direction

## 2024-12-31 NOTE — PROGRESS NOTE ADULT - NS_MD_PANP_GEN_ALL_CORE
Attending and PA/NP shared services statement (NON-critical care): Pt is 39 y/o male, presenting to the ED via EMS c/o weakness. EMS reports pt coming from dialysis center, where he received full session of dialysis. Dialysis center called EMS to send pt to ED for further eval. Pt is wheel chair bound, reports that he usually self transfers in AM, and had difficultly today w/ increased weakness as day progressed. Pt has hx of b/l hip fx, reports takes oxy daily, did not receive dose today, EMS reports giving 5mg of morphine IM PTA. Pt noted to be febrile and tachycardic upon arrival to ED. Pt reports that he does not produce urine. PMH of HTN, hypothyroid, ESRD, fistula noted to RUE. Upon assessment, pt AxOx3, sitting up in stretcher and speaking in full sentences. Breathing spontaneously and noted to be 90% on RA, placed on 2L NC and continuous pulse ox. Abdomen soft and nontender to palpation. EKG done, given to MD, pt placed on continuous cardiac monitor, sinus tach. Pt has difficultly miving b/l LE @ baseline, wheel chair dependent.. Skin is warm, dry, and intact w/ + peripheral pulses. Pt denies SOB, chest pain, n/v/d, dizziness, vision changes. Safety and comfort measures provided- bed in lowest position, locked, and blanket given.

## 2024-12-31 NOTE — DISCHARGE NOTE PROVIDER - NSDCCPCAREPLAN_GEN_ALL_CORE_FT
PRINCIPAL DISCHARGE DIAGNOSIS  Diagnosis: Surgical site infection  Assessment and Plan of Treatment: seroma

## 2024-12-31 NOTE — DISCHARGE NOTE PROVIDER - NSDCFUSCHEDAPPT_GEN_ALL_CORE_FT
Zay Stovall  [facility]  HNT PreAdmits  Scheduled Appointment: 12/31/2024    [facility] Physician [facility]  VASCULAR [address]  Scheduled Appointment: 01/29/2025    Caryl Manjarrez  [facility] Physician [facility]  VASCULAR [address]  Scheduled Appointment: 01/29/2025

## 2024-12-31 NOTE — PROGRESS NOTE ADULT - NS ATTEND AMEND GEN_ALL_CORE FT
Patient evaluated at the bedside this morning in the ER. She is s/p Right CFA endarterectomy ~ 2 weeks ago with Dr Manjarrez. She has dehiscence of the proximal incision with mild serous drainage. Had CTA showing Fluid collection in the proximal thigh mostly superficial. No leukocytosis. Denies fever or chills. Distal incision overlying the fluid collection was probed and serosanguinous fluid was drained. No gross purulence. We recommended washout in the OR to prevent infection of the fluid collection here at [facility]. Patient was hesitant and wanted to follow up with her primary vascular surgeon. I discussed the case with Dr Manjarrez who recommended discharge on po antibiotics and admission to HNT this week for surgical intervention. Patient wishes to proceed with the aforementioned plan.

## 2025-01-03 ENCOUNTER — INPATIENT (INPATIENT)
Facility: HOSPITAL | Age: 75
LOS: 10 days | Discharge: HOME HEALTH | DRG: 857 | End: 2025-01-14
Attending: STUDENT IN AN ORGANIZED HEALTH CARE EDUCATION/TRAINING PROGRAM | Admitting: STUDENT IN AN ORGANIZED HEALTH CARE EDUCATION/TRAINING PROGRAM
Payer: MEDICARE

## 2025-01-03 VITALS — HEIGHT: 70 IN | WEIGHT: 220.02 LBS

## 2025-01-03 DIAGNOSIS — Z98.890 OTHER SPECIFIED POSTPROCEDURAL STATES: Chronic | ICD-10-CM

## 2025-01-03 DIAGNOSIS — K95.09 OTHER COMPLICATIONS OF GASTRIC BAND PROCEDURE: Chronic | ICD-10-CM

## 2025-01-03 DIAGNOSIS — Z90.49 ACQUIRED ABSENCE OF OTHER SPECIFIED PARTS OF DIGESTIVE TRACT: Chronic | ICD-10-CM

## 2025-01-03 DIAGNOSIS — Z98.49 CATARACT EXTRACTION STATUS, UNSPECIFIED EYE: Chronic | ICD-10-CM

## 2025-01-03 DIAGNOSIS — T81.49XA INFECTION FOLLOWING A PROCEDURE, OTHER SURGICAL SITE, INITIAL ENCOUNTER: ICD-10-CM

## 2025-01-03 LAB
ALBUMIN SERPL ELPH-MCNC: 2.9 G/DL — LOW (ref 3.3–5)
ALP SERPL-CCNC: 99 U/L — SIGNIFICANT CHANGE UP (ref 40–120)
ALT FLD-CCNC: 12 U/L — SIGNIFICANT CHANGE UP (ref 12–78)
ANION GAP SERPL CALC-SCNC: 2 MMOL/L — LOW (ref 5–17)
APTT BLD: 48.9 SEC — HIGH (ref 24.5–35.6)
AST SERPL-CCNC: 8 U/L — LOW (ref 15–37)
BASOPHILS # BLD AUTO: 0.07 K/UL — SIGNIFICANT CHANGE UP (ref 0–0.2)
BASOPHILS NFR BLD AUTO: 0.9 % — SIGNIFICANT CHANGE UP (ref 0–2)
BILIRUB SERPL-MCNC: 0.2 MG/DL — SIGNIFICANT CHANGE UP (ref 0.2–1.2)
BUN SERPL-MCNC: 18 MG/DL — SIGNIFICANT CHANGE UP (ref 7–23)
CALCIUM SERPL-MCNC: 8.6 MG/DL — SIGNIFICANT CHANGE UP (ref 8.5–10.1)
CHLORIDE SERPL-SCNC: 106 MMOL/L — SIGNIFICANT CHANGE UP (ref 96–108)
CO2 SERPL-SCNC: 28 MMOL/L — SIGNIFICANT CHANGE UP (ref 22–31)
CREAT SERPL-MCNC: 0.78 MG/DL — SIGNIFICANT CHANGE UP (ref 0.5–1.3)
EGFR: 79 ML/MIN/1.73M2 — SIGNIFICANT CHANGE UP
EOSINOPHIL # BLD AUTO: 1.02 K/UL — HIGH (ref 0–0.5)
EOSINOPHIL NFR BLD AUTO: 13.4 % — HIGH (ref 0–6)
GLUCOSE SERPL-MCNC: 82 MG/DL — SIGNIFICANT CHANGE UP (ref 70–99)
HCT VFR BLD CALC: 26.6 % — LOW (ref 34.5–45)
HGB BLD-MCNC: 7.7 G/DL — LOW (ref 11.5–15.5)
IMM GRANULOCYTES NFR BLD AUTO: 0.1 % — SIGNIFICANT CHANGE UP (ref 0–0.9)
INR BLD: 3.34 RATIO — HIGH (ref 0.85–1.16)
LACTATE SERPL-SCNC: 1.4 MMOL/L — SIGNIFICANT CHANGE UP (ref 0.7–2)
LYMPHOCYTES # BLD AUTO: 1.15 K/UL — SIGNIFICANT CHANGE UP (ref 1–3.3)
LYMPHOCYTES # BLD AUTO: 15.1 % — SIGNIFICANT CHANGE UP (ref 13–44)
MCHC RBC-ENTMCNC: 24.8 PG — LOW (ref 27–34)
MCHC RBC-ENTMCNC: 28.9 G/DL — LOW (ref 32–36)
MCV RBC AUTO: 85.8 FL — SIGNIFICANT CHANGE UP (ref 80–100)
MONOCYTES # BLD AUTO: 0.71 K/UL — SIGNIFICANT CHANGE UP (ref 0–0.9)
MONOCYTES NFR BLD AUTO: 9.3 % — SIGNIFICANT CHANGE UP (ref 2–14)
NEUTROPHILS # BLD AUTO: 4.67 K/UL — SIGNIFICANT CHANGE UP (ref 1.8–7.4)
NEUTROPHILS NFR BLD AUTO: 61.2 % — SIGNIFICANT CHANGE UP (ref 43–77)
PLATELET # BLD AUTO: 279 K/UL — SIGNIFICANT CHANGE UP (ref 150–400)
POTASSIUM SERPL-MCNC: 4.4 MMOL/L — SIGNIFICANT CHANGE UP (ref 3.5–5.3)
POTASSIUM SERPL-SCNC: 4.4 MMOL/L — SIGNIFICANT CHANGE UP (ref 3.5–5.3)
PROT SERPL-MCNC: 7.5 GM/DL — SIGNIFICANT CHANGE UP (ref 6–8.3)
PROTHROM AB SERPL-ACNC: 39 SEC — HIGH (ref 9.9–13.4)
RBC # BLD: 3.1 M/UL — LOW (ref 3.8–5.2)
RBC # FLD: 16.9 % — HIGH (ref 10.3–14.5)
SODIUM SERPL-SCNC: 136 MMOL/L — SIGNIFICANT CHANGE UP (ref 135–145)
WBC # BLD: 7.63 K/UL — SIGNIFICANT CHANGE UP (ref 3.8–10.5)
WBC # FLD AUTO: 7.63 K/UL — SIGNIFICANT CHANGE UP (ref 3.8–10.5)

## 2025-01-03 PROCEDURE — 87015 SPECIMEN INFECT AGNT CONCNTJ: CPT

## 2025-01-03 PROCEDURE — 85610 PROTHROMBIN TIME: CPT

## 2025-01-03 PROCEDURE — 87070 CULTURE OTHR SPECIMN AEROBIC: CPT

## 2025-01-03 PROCEDURE — 87075 CULTR BACTERIA EXCEPT BLOOD: CPT

## 2025-01-03 PROCEDURE — 83540 ASSAY OF IRON: CPT

## 2025-01-03 PROCEDURE — 83735 ASSAY OF MAGNESIUM: CPT

## 2025-01-03 PROCEDURE — 99285 EMERGENCY DEPT VISIT HI MDM: CPT | Mod: FS

## 2025-01-03 PROCEDURE — 83605 ASSAY OF LACTIC ACID: CPT

## 2025-01-03 PROCEDURE — 82728 ASSAY OF FERRITIN: CPT

## 2025-01-03 PROCEDURE — 80202 ASSAY OF VANCOMYCIN: CPT

## 2025-01-03 PROCEDURE — 87102 FUNGUS ISOLATION CULTURE: CPT

## 2025-01-03 PROCEDURE — 86850 RBC ANTIBODY SCREEN: CPT

## 2025-01-03 PROCEDURE — 97530 THERAPEUTIC ACTIVITIES: CPT | Mod: GP

## 2025-01-03 PROCEDURE — 87206 SMEAR FLUORESCENT/ACID STAI: CPT

## 2025-01-03 PROCEDURE — 86900 BLOOD TYPING SEROLOGIC ABO: CPT

## 2025-01-03 PROCEDURE — 87040 BLOOD CULTURE FOR BACTERIA: CPT

## 2025-01-03 PROCEDURE — 85025 COMPLETE CBC W/AUTO DIFF WBC: CPT

## 2025-01-03 PROCEDURE — 97116 GAIT TRAINING THERAPY: CPT | Mod: GP

## 2025-01-03 PROCEDURE — 87116 MYCOBACTERIA CULTURE: CPT

## 2025-01-03 PROCEDURE — 80053 COMPREHEN METABOLIC PANEL: CPT

## 2025-01-03 PROCEDURE — 93005 ELECTROCARDIOGRAM TRACING: CPT

## 2025-01-03 PROCEDURE — 93010 ELECTROCARDIOGRAM REPORT: CPT

## 2025-01-03 PROCEDURE — 87186 SC STD MICRODIL/AGAR DIL: CPT

## 2025-01-03 PROCEDURE — 85027 COMPLETE CBC AUTOMATED: CPT

## 2025-01-03 PROCEDURE — 99221 1ST HOSP IP/OBS SF/LOW 40: CPT | Mod: GC

## 2025-01-03 PROCEDURE — 97162 PT EVAL MOD COMPLEX 30 MIN: CPT | Mod: GP

## 2025-01-03 PROCEDURE — 36415 COLL VENOUS BLD VENIPUNCTURE: CPT

## 2025-01-03 PROCEDURE — 83550 IRON BINDING TEST: CPT

## 2025-01-03 PROCEDURE — 85730 THROMBOPLASTIN TIME PARTIAL: CPT

## 2025-01-03 PROCEDURE — 99232 SBSQ HOSP IP/OBS MODERATE 35: CPT | Mod: FS

## 2025-01-03 PROCEDURE — 82962 GLUCOSE BLOOD TEST: CPT

## 2025-01-03 PROCEDURE — 87077 CULTURE AEROBIC IDENTIFY: CPT

## 2025-01-03 PROCEDURE — 80048 BASIC METABOLIC PNL TOTAL CA: CPT

## 2025-01-03 PROCEDURE — 84100 ASSAY OF PHOSPHORUS: CPT

## 2025-01-03 PROCEDURE — 86901 BLOOD TYPING SEROLOGIC RH(D): CPT

## 2025-01-03 RX ORDER — COLLAGENASE CLOSTRIDIUM HIST. 250 UNIT/G
1 OINTMENT (GRAM) TOPICAL ONCE
Refills: 0 | Status: DISCONTINUED | OUTPATIENT
Start: 2025-01-03 | End: 2025-01-14

## 2025-01-03 RX ORDER — GABAPENTIN 300 MG/1
800 CAPSULE ORAL THREE TIMES A DAY
Refills: 0 | Status: DISCONTINUED | OUTPATIENT
Start: 2025-01-03 | End: 2025-01-14

## 2025-01-03 RX ORDER — DAPAGLIFLOZIN 5 MG/1
5 TABLET, FILM COATED ORAL DAILY
Refills: 0 | Status: DISCONTINUED | OUTPATIENT
Start: 2025-01-03 | End: 2025-01-05

## 2025-01-03 RX ORDER — INFLUENZA A VIRUS A/WISCONSIN/588/2019 (H1N1) RECOMBINANT HEMAGGLUTININ ANTIGEN, INFLUENZA A VIRUS A/DARWIN/6/2021 (H3N2) RECOMBINANT HEMAGGLUTININ ANTIGEN, INFLUENZA B VIRUS B/AUSTRIA/1359417/2021 RECOMBINANT HEMAGGLUTININ ANTIGEN, AND INFLUENZA B VIRUS B/PHUKET/3073/2013 RECOMBINANT HEMAGGLUTININ ANTIGEN 45; 45; 45; 45 UG/.5ML; UG/.5ML; UG/.5ML; UG/.5ML
0.5 INJECTION INTRAMUSCULAR ONCE
Refills: 0 | Status: DISCONTINUED | OUTPATIENT
Start: 2025-01-03 | End: 2025-01-14

## 2025-01-03 RX ORDER — OXYCODONE HCL 15 MG
5 TABLET ORAL EVERY 8 HOURS
Refills: 0 | Status: DISCONTINUED | OUTPATIENT
Start: 2025-01-03 | End: 2025-01-07

## 2025-01-03 RX ORDER — AMIODARONE HYDROCHLORIDE 200 MG/1
200 TABLET ORAL DAILY
Refills: 0 | Status: DISCONTINUED | OUTPATIENT
Start: 2025-01-03 | End: 2025-01-14

## 2025-01-03 RX ORDER — FUROSEMIDE 20 MG
40 TABLET ORAL DAILY
Refills: 0 | Status: DISCONTINUED | OUTPATIENT
Start: 2025-01-03 | End: 2025-01-06

## 2025-01-03 RX ORDER — CLOPIDOGREL BISULFATE 75 MG/1
75 TABLET, FILM COATED ORAL DAILY
Refills: 0 | Status: DISCONTINUED | OUTPATIENT
Start: 2025-01-03 | End: 2025-01-14

## 2025-01-03 RX ORDER — SPIRONOLACTONE 50 MG/1
25 TABLET ORAL DAILY
Refills: 0 | Status: DISCONTINUED | OUTPATIENT
Start: 2025-01-03 | End: 2025-01-06

## 2025-01-03 RX ORDER — ACETAMINOPHEN 80 MG/.8ML
650 SOLUTION/ DROPS ORAL EVERY 6 HOURS
Refills: 0 | Status: DISCONTINUED | OUTPATIENT
Start: 2025-01-03 | End: 2025-01-14

## 2025-01-03 RX ORDER — HYDROMORPHONE HCL 4 MG
0.5 TABLET ORAL ONCE
Refills: 0 | Status: DISCONTINUED | OUTPATIENT
Start: 2025-01-03 | End: 2025-01-03

## 2025-01-03 RX ORDER — METOPROLOL TARTRATE 50 MG
25 TABLET ORAL DAILY
Refills: 0 | Status: DISCONTINUED | OUTPATIENT
Start: 2025-01-03 | End: 2025-01-14

## 2025-01-03 RX ORDER — AZTREONAM 1 G/1
1000 INJECTION, POWDER, LYOPHILIZED, FOR SOLUTION INTRAMUSCULAR; INTRAVENOUS ONCE
Refills: 0 | Status: COMPLETED | OUTPATIENT
Start: 2025-01-03 | End: 2025-01-03

## 2025-01-03 RX ORDER — SODIUM CHLORIDE 9 MG/ML
500 INJECTION, SOLUTION INTRAMUSCULAR; INTRAVENOUS; SUBCUTANEOUS ONCE
Refills: 0 | Status: COMPLETED | OUTPATIENT
Start: 2025-01-03 | End: 2025-01-03

## 2025-01-03 RX ORDER — VANCOMYCIN HYDROCHLORIDE 5 G/100ML
1500 INJECTION, POWDER, LYOPHILIZED, FOR SOLUTION INTRAVENOUS ONCE
Refills: 0 | Status: COMPLETED | OUTPATIENT
Start: 2025-01-03 | End: 2025-01-03

## 2025-01-03 RX ORDER — RIVAROXABAN 2.5 MG/1
20 TABLET, FILM COATED ORAL DAILY
Refills: 0 | Status: DISCONTINUED | OUTPATIENT
Start: 2025-01-03 | End: 2025-01-03

## 2025-01-03 RX ORDER — CLINDAMYCIN HYDROCHLORIDE 300 MG/1
600 CAPSULE ORAL EVERY 8 HOURS
Refills: 0 | Status: DISCONTINUED | OUTPATIENT
Start: 2025-01-03 | End: 2025-01-04

## 2025-01-03 RX ORDER — DILTIAZEM HYDROCHLORIDE 300 MG/1
120 CAPSULE, COATED, EXTENDED RELEASE ORAL DAILY
Refills: 0 | Status: DISCONTINUED | OUTPATIENT
Start: 2025-01-03 | End: 2025-01-14

## 2025-01-03 RX ORDER — ZOLPIDEM TARTRATE 5 MG
10 TABLET ORAL AT BEDTIME
Refills: 0 | Status: DISCONTINUED | OUTPATIENT
Start: 2025-01-03 | End: 2025-01-09

## 2025-01-03 RX ADMIN — VANCOMYCIN HYDROCHLORIDE 250 MILLIGRAM(S): 5 INJECTION, POWDER, LYOPHILIZED, FOR SOLUTION INTRAVENOUS at 15:42

## 2025-01-03 RX ADMIN — Medication 10 MILLIGRAM(S): at 23:56

## 2025-01-03 RX ADMIN — Medication 5 MILLIGRAM(S): at 15:58

## 2025-01-03 RX ADMIN — AZTREONAM 50 MILLIGRAM(S): 1 INJECTION, POWDER, LYOPHILIZED, FOR SOLUTION INTRAMUSCULAR; INTRAVENOUS at 14:37

## 2025-01-03 RX ADMIN — CLINDAMYCIN HYDROCHLORIDE 100 MILLIGRAM(S): 300 CAPSULE ORAL at 23:02

## 2025-01-03 RX ADMIN — SODIUM CHLORIDE 500 MILLILITER(S): 9 INJECTION, SOLUTION INTRAMUSCULAR; INTRAVENOUS; SUBCUTANEOUS at 14:37

## 2025-01-03 RX ADMIN — GABAPENTIN 800 MILLIGRAM(S): 300 CAPSULE ORAL at 22:42

## 2025-01-03 RX ADMIN — Medication 0.5 MILLIGRAM(S): at 22:59

## 2025-01-03 NOTE — ED ADULT NURSE NOTE - CHIEF COMPLAINT QUOTE
SIB MD WATTS FOR INFECTION, FEVER S/P FROIN SURGERY AT [facility]  2 WEEKS AGO. PMH: DMii, htn.
oral

## 2025-01-03 NOTE — ED STATDOCS - PHYSICAL EXAMINATION
Constitutional: NAD AAOx3  Eyes: PERRLA EOMI  Head: Normocephalic atraumatic  Mouth: MMM  Cardiac: regular rate   Resp: unlabored breathing  GI: Abd s/nt/nd  Neuro: grossly normal and intact  Skin: No visible rashes, dressing soaked with purulent discharge mild erythema and tenderness around open wound in the right groin attending: Constitutional: NAD AAOx3  Eyes: PERRLA EOMI  Head: Normocephalic atraumatic  Mouth: MMM  Cardiac: regular rate   Resp: unlabored breathing  GI: Abd s/nt/nd  Neuro: grossly normal and intact  Skin: No visible rashes, dressing soaked with purulent discharge mild erythema and tenderness around open wound in the right groin

## 2025-01-03 NOTE — H&P ADULT - NSHPLABSRESULTS_GEN_ALL_CORE
7.7    7.63  )-----------( 279      ( 03 Jan 2025 13:48 )             26.6     01-03    136  |  106  |  18  ----------------------------<  82  4.4   |  28  |  0.78    Ca    8.6      03 Jan 2025 13:48    TPro  7.5  /  Alb  2.9[L]  /  TBili  0.2  /  DBili  x   /  AST  8[L]  /  ALT  12  /  AlkPhos  99  01-03    < from: CT Abdomen and Pelvis w/ IV Cont (12.31.24 @ 04:55) >    IMPRESSION:  10 cm in greatest dimension fluid collection superficial to the right   common femoral vessels. Given the history this most likely represents an   abscess. Subacute hematoma, seroma might also appear this way.    Biliary ductal dilation which has increased slightly. While most likely   compensatory and nonobstructive in this patient status post   cholecystectomy, obstruction remains possible though less likely.    Several other chronic findings as above.    < end of copied text >

## 2025-01-03 NOTE — PATIENT PROFILE ADULT - FUNCTIONAL ASSESSMENT - BASIC MOBILITY SECTION LABEL
This patient is being seen in consultation from Boston Corona MD    CHIEF COMPLAINT(S): No chief complaint on file.      HISTORY OF PRESENT ILLNESS:  Scooter Finney is a 6 year old {k right/left handed:006148} male who is here for an initial evaluation for right ring finger pain which began ***. Patient states ***. Patient was previously seen at urgent care on 2024 where x-rays were obtained. Today He rates the pain ***/10, describing it as *** and localizing it to ***. The pain is worsened with ***. He *** neurological or mechanical symptoms. ***    Previously saw Dr. Murphy for right pinky finger fx proximal phalanx 2023, patient did not follow-up     Accompanied by ***  Location: right pinky finger  Date of Onset: ***  Severity:{#:79705}10   Timing: {k pain timin}  Quality: {ssk pain adjective:389689}  Work related injury: {SRPYESNO:584045}  Associated signs and symptoms: {ssk pain related symptoms:181319}  Aggravating factors: {ssk aggrevating factors:764830}  Alleviating factors: {ssk alleviating factors:079542}  ***    Review of Systems:   Skin: {ROS - SKIN:851373::\"No problems with hair or nails. No rash. No new skin lesions.\"}  Heent: {ROS HEENT:975462::\"Pt denies problems with head, eyes, ears, nose, mouth, throat and neck\"}  Respiratory: {ROS RESP:435674::\"No coughing, wheezing, changes in voice, nor shortness of breath\"}  Cardiovascular: {ROS CARDIAC:411868::\"No chest pain, palpitations or other cardiac complaints noted\"}  Gastrointestinal: {[facility] AMB SPM ROS GI:290724::\"No diarrhea, constipation, abdominal pain or other complaints noted\"}  Genitourinary: {[facility] AMB SPM OPHTH :382208::\"Pt denies urinary pain, bleeding and voiding problems\"}  Musculoskeletal: See HPI for details  Neurologic: {ROS NEURO:464520::\"Pt denies syncope, seizures, paralysis, involuntary movements or gait problems\"}  Psychiatric: {ROS PSYCH:154935::\"Pt denies sleep, anxiety, depression, sexual and other  psychiatric problems\"}  Hematologic/Lymphatic/Immunologic:{SRPLYMPHATIC:754881::\"Patient denies fever, night sweats or weight loss \"}  Endocrine:{SRPENDO:354836::\"Patient denies polyphagia, polydipsia or polyuria \"}       Past Medical History Updated: Yes  PAST MEDICAL HISTORY:  No past medical history on file.    Past Surgical History Updated: Yes  PAST SURGICAL HISTORY:  Past Surgical History:   Procedure Laterality Date    [place] release  01/24/2020    penoplasty, circumcision, and Byar's flaps       Past Family History Updated: Yes  FAMILY HISTORY:  Family History   Problem Relation Age of Onset    Patient is unaware of any medical problems Mother      Reviewed and non-contributory to patient's illness unless otherwise stated above    Past Social History Updated: Yes  SOCIAL HISTORY: Non contributory     MEDICATIONS:  No current outpatient medications on file.     No current facility-administered medications for this visit.     ALLERGIES:   ALLERGIES:  No Known Allergies    VITAL SIGNS:  There were no vitals taken for this visit.  There is no height or weight on file to calculate BMI.    EXAM:  This is a 6 year old male, awake, alert, and cooperative. He is well nourished, well developed, and in no apparent distress.  Pulmonary - Respiratory rate within normal limits. No increased work of breathing.  ***  Lymphatics - There is no evidence of generalized adenopathy or Lymphedema     IMAGING &TEST:  Relevant imaging studies (x-rays, MRI), diagnostic tests, labs, therapy and medical notes were reviewed during the encounter and discussed with the patient.  These show:  Narrative & Impression   EXAM: XR FINGER(S) 2 OR MORE VIEWS RIGHT     CLINICAL INDICATION: Right inguinal pain. Injury.     COMPARISON: 6/27/2023     FINDINGS: 3 views of the right small finger are provided. There is soft  tissue swelling about the finger. There is a linear lucency through the  distal aspect of the proximal phalanx.     IMPRESSION:      Findings likely representing a nondisplaced fracture through the proximal  phalanx of the small finger as above.       ASSESSMENT & PLAN:  Scooter Finney is a 6 year old male with No diagnosis found.       Plan as follows:  1. ***  2. ***  3. ***  4. ***     Restrictions: ***      The patient will follow up with us in {NUMBERS 1-31:778833} {days wks mos yr:518787}     Total time required for this encounter: {MINUTES:494046} minutes include evaluation of medical records, review of recent new imaging, face-to-face contact with the patient, medical decision making, and documentation      This note has been routed to the referring Clinician  Boston Corona MD  [address] [place] [address]      ***    ***       .

## 2025-01-03 NOTE — ED ADULT NURSE NOTE - OBJECTIVE STATEMENT
75 y/o female presents to ED c/o infection to R groin. Pt reports she had vascular surgery in R groin and the site became infected. Pt was admitted to [facility] weeks ago and received antibiotics. Pt reports no improvement to wound. Denies fevers. Pt c/o pain to site. PMHx CHF

## 2025-01-03 NOTE — ED ADULT TRIAGE NOTE - CHIEF COMPLAINT QUOTE
SIB MD WATTS FOR INFECTION, FEVER S/P FROIN SURGERY AT [facility]  2 WEEKS AGO. PMH: DMii, htn.

## 2025-01-03 NOTE — H&P ADULT - NSHPPHYSICALEXAM_GEN_ALL_CORE
GENERAL: NAD, well developed, obese  HEAD: Atraumatic, normocephalic  EYES: EOMI, PERRLA, conjunctiva and sclera clear  ENT: moist mucous membrane  NECK: supple, No JVD, midline trachea  CHEST/LUNG: No increased WOB, symmetric excursions  Heart: RRR ppp, no peripheral edema  ABDOMEN: round, soft, nondistended, nontender. no organomegaly  EXTREMITIES: +2 left fem, 1+ right fem pulses, doppler signals of b/l DP biphasic and b/l PT monophasic (weaker on right), dressing of right heel & left calf present  NERVOUS SYSTEM: AOx4, speech clear, no neuro-deficits  MSK: full ROM, no deformities  SKIN: R groin incision with erythema, GENERAL: NAD, well developed, obese  HEAD: Atraumatic, normocephalic  EYES: EOMI, PERRLA, conjunctiva and sclera clear  ENT: moist mucous membrane  NECK: supple, No JVD, midline trachea  CHEST/LUNG: No increased WOB, symmetric excursions  Heart: RRR ppp, no peripheral edema  ABDOMEN: round, soft, nondistended, nontender. no organomegaly  EXTREMITIES: Ext.+2 left fem, 1+ right fem pulses, doppler signals of b/l DP biphasic and b/l PT monophasic. Rt foot dressing c/d/i  Skin: R groin wound at the prior incision site with slough, no obvious drainage. B/l LE stasis dermatitis.   NERVOUS SYSTEM: AOx4, speech clear, no neuro-deficits  MSK: full ROM, no deformities

## 2025-01-03 NOTE — PATIENT PROFILE ADULT - ..
03-Jan-2025 17:59:19 Interpolation Flap Text: A decision was made to reconstruct the defect utilizing an interpolation axial flap and a staged reconstruction.  A telfa template was made of the defect.  This telfa template was then used to outline the interpolation flap.  The donor area for the pedicle flap was then injected with anesthesia.  The flap was excised through the skin and subcutaneous tissue down to the layer of the underlying musculature.  The interpolation flap was carefully excised within this deep plane to maintain its blood supply.  The edges of the donor site were undermined.   The donor site was closed in a primary fashion.  The pedicle was then rotated into position and sutured.  Once the tube was sutured into place, adequate blood supply was confirmed with blanching and refill.  The pedicle was then wrapped with xeroform gauze and dressed appropriately with a telfa and gauze bandage to ensure continued blood supply and protect the attached pedicle.

## 2025-01-03 NOTE — ED ADULT NURSE NOTE - NSFALLRISKINTERV_ED_ALL_ED

## 2025-01-03 NOTE — ED ADULT NURSE NOTE - CAS EDN DISCHARGE INTERVENTIONS
Dr. Duke: Sign out to Dr. Melgoza  Pt pending ortho consult xray demonstrated fracture dislocation. Ortho aware Rogerio: Received s/o from Dr. Sosa. Pending ortho evaluation. Rogerio: Received s/o from Dr. Sosa. Pending ortho evaluation. tender swollen R foot unable to move R great toe. pain is well controlled when not touched or moving. Crow: Acknowledged from Dr. Melgoza, 13 yo M with Right foot dislocation/fractures, attempted reduction by ortho, pending CT scan. IV intact/Admission wristband placed

## 2025-01-03 NOTE — ED STATDOCS - PROGRESS NOTE DETAILS
PA: Patient is a 74-year-old female with PMHx of  DM, HTN, HLD, s/p right femoral artery endarterectomy on 12/17 by Dr Manjarrez who presents to [facility] c/o right groin wound infection. Patient reports purulent drainage from surgical wound site. Patient was sent in by her vascular surgeon for IV antibiotics and admission and revision of the surgery. DENIES fever.  ~Michael Dodd PA-C spoke with surgical resident for dr. Manjarrez, will admit and see patient in ED. ~Michael Dodd PA-C

## 2025-01-03 NOTE — H&P ADULT - ATTENDING COMMENTS
seen and examined patient is one month s/p right femoral endart with bovine pericardium with groin breakdown and suspected superificial infection; also with CT surggestive of collection possible abscess; no fevers no leukocytosis    plan for admission, IV abx, ID consult, will hold AC for OR, plan for twice per day groin dressing change and wound care, podiatry eval for foot wounds, plan for OR with plastics early this week

## 2025-01-03 NOTE — ED STATDOCS - OBJECTIVE STATEMENT
74-year-old female hx dm htn hld  s/p right SFA endarterectomy on 12/17 by Dr Manjarrez.  Presents to the emergency department for right groin wound infection.  Patient states she was sent in by her vascular surgeon for IV antibiotics and admission and revision of the surgery.  Patient denies fevers.  States that she is having a lot of discharge from the wound.  Exam with dressing soaked with purulent discharge mild erythema and tenderness around open wound in the right groin.  Will call vascular surgery do septic workup give antibiotics and admit.

## 2025-01-03 NOTE — H&P ADULT - HISTORY OF PRESENT ILLNESS
74-year-old F, with PMHx of DM, TAVR 8/2024 on Plavix , A-fib on Xarelto, CHF, HLD, PVD, b/l LE venous insufficiency, bilateral CFA stenoses s/p Rt SFA endarterectomy on 12/17/24 presenting with purulent drainage at the R groin surgical site. She  74-year-old F, with PMHx of DM, TAVR 8/2024 on Plavix , A-fib on Xarelto, CHF, HLD, PVD, b/l LE venous insufficiency, bilateral CFA stenoses s/p Rt SFA endarterectomy on 12/17/24 presenting with purulent drainage at the R groin surgical site. She report of subjective fevers. She was seen recently at [facility] and was provided with oral abx and local wound care but symptoms have not improved.

## 2025-01-03 NOTE — H&P ADULT - ASSESSMENT
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       PLAN  -Admit for Vascular surgery   IV abx   local wound care   ID consult   Medical consult for co-management   Podiatry consult   Resume home meds   Will plan for wound washout and closure with flap next week.

## 2025-01-03 NOTE — CONSULT NOTE ADULT - ASSESSMENT
A: 73 y/o Female seen for the following:   -Rt heel partial thickness pressure wound, stable without acute SOI  -Lt posterior lower leg partial thickness venous stasis wound, stable without acute SOI  -BLE venous stasis  -PVD    P:   Chart reviewed and Patient evaluated  Wound to the posterior Left lower mid leg, with some serous drainage, no malodor, superficial in nature, stable without SOI.   WC: betadine with dry sterile dressing to Rt heel. Xeroform and Compressive dressings applied to LLE  Vascular reccs appreciated-->wound washout and closure with flap next week.     WBAT to BLE  Offload bilateral heels with two pillows under bilateral calfs or total CAIR boots to prevent further soft tissue damage as pt is has been predominately bedbound or sitting on a chair while placing pressure to heels.    No acute podiatric intervention warranted at this time. Continue with local wound care  Pt to follow up at [facility] with Dr. Stovall for continued wound care treatment.   All additional care per Med appreciated  Patient demonstrated verbal understanding of all interventions and tolerated interventions well without any complications.       Case D/W with attending Dr. Stovall A: 73 y/o Female seen for the following:   -Rt heel partial thickness pressure wound, stable without acute SOI  -Lt posterior lower leg partial thickness venous stasis wound, stable without acute SOI  -BLE venous stasis  -PVD    P:   Chart reviewed and Patient evaluated  Wound to the posterior Left lower mid leg, with some serous drainage, no malodor, superficial in nature, stable without SOI.   WC: betadine with dry sterile dressing to Rt heel. Xeroform and Compressive dressings applied to LLE  Vascular reccs appreciated-->Rt groin wound washout and closure with flap next week.     WBAT to BLE  Offload bilateral heels with two pillows under bilateral calfs or total CAIR boots to prevent further soft tissue damage as pt is has been predominately bedbound or sitting on a chair while placing pressure to heels.    No acute podiatric intervention warranted at this time. Continue with local wound care  Pt to follow up at [facility] with Dr. Stovall for continued wound care treatment.   All additional care per vascular and Med appreciated  Patient demonstrated verbal understanding of all interventions and tolerated interventions well without any complications.       Case D/W with attending Dr. Stovall A: 75 y/o Female seen for the following:   -Rt heel partial thickness pressure wound, stable without acute SOI  -Lt posterior lower leg partial thickness venous stasis wound, stable without acute SOI  -BLE venous stasis  -PVD    P:   Chart reviewed and Patient evaluated  Wound to the posterior Left lower mid leg, with some serous drainage, no malodor, superficial in nature, stable without SOI. Rt heel stage 2 pressure ulcer; stable without any SOI.  WC: betadine with dry sterile dressing to Rt heel. Xeroform and Compressive dressings applied to LLE  WBAT to BLE  Offload bilateral heels with two pillows under bilateral calfs or total CAIR boots to prevent further soft tissue damage as pt is has been predominately bedbound or sitting on a chair while placing pressure to heels.    No acute podiatric intervention warranted at this time. Continue with local wound care  Pt to follow up at [facility] with Dr. Stovall for continued wound care treatment.   All additional care per vascular and Med appreciated  Patient demonstrated verbal understanding of all interventions and tolerated interventions well without any complications  Podiatry will follow peripherally while in house      Case D/W with attending Dr. Stovall A: 75 y/o Female seen for the following:   -Rt heel partial thickness pressure wound, stable without acute SOI  -Lt posterior lower leg partial thickness venous stasis wound, stable without acute SOI  -BLE venous stasis  -PVD    P:   Chart reviewed and Patient evaluated  Wound to the posterior Left lower mid leg, with some serous drainage, no malodor, superficial in nature, stable without SOI. Rt heel stage 2 pressure ulcer; stable without any SOI. Wounds improved compared to prev admission.  WC: santyl with dry sterile dressing to Rt heel. santyl and Compressive dressings applied to LLE  WBAT to BLE  Offload bilateral heels with two pillows under bilateral calfs or total CAIR boots to prevent further soft tissue damage as pt is has been predominately bedbound or sitting on a chair while placing pressure to heels.    No acute podiatric intervention warranted at this time. Continue with local wound care  Pt to follow up at [facility] with Dr. Stovall for continued wound care treatment.   All additional care per vascular and Med appreciated  Patient demonstrated verbal understanding of all interventions and tolerated interventions well without any complications  Podiatry will follow peripherally while in house      Case D/W with attending Dr. Stovall    Wound care instructions for the Rt heel and Lt lower leg to be changed every other day:  - Please apply santyl to the Right heel and Left lower leg  - Cover with gauze and fernando or kerlix. secure with tape.  Thank you

## 2025-01-03 NOTE — CONSULT NOTE ADULT - SUBJECTIVE AND OBJECTIVE BOX
Date of consult: 1/3/2025    HPI: 74-year-old F, with PMHx of DM, TAVR 8/2024 on Plavix , A-fib on Xarelto, CHF, HLD, PVD, b/l LE venous insufficiency, bilateral CFA stenoses s/p Rt SFA endarterectomy on 12/17/24 presenting with purulent drainage at the R groin surgical site. She report of subjective fevers. She was seen recently at [facility] and was provided with oral abx and local wound care but symptoms have not improved.  (03 Jan 2025 14:26)  Podiatry consulted for management of bilateral LE wounds, pt has wound to the right heel and wounds to the left lower leg for many weeks. Wounds are managed by local wound care and follow up with podiatry. Pt with recent vascular procedure by Dr Manjarrez. Pt denies any worsening of the Lower leg wounds. No other pedal complaints.     PMH: Diabetes mellitus type II, controlled    Atrial fibrillation    Congestive heart failure    Dyspnea    Morbid obesity with BMI of 40.0-44.9, adult    Neuropathy    Peripheral venous insufficiency    Thyroid nodule    HTN (hypertension)    Cellulitis    At risk for sleep apnea      PSH:History of esophagogastroduodenoscopy (EGD)    H/O colonoscopy    Other complications of gastric band procedure    S/P left knee arthroscopy    Status post medial meniscus repair    History of cholecystectomy    S/P cataract surgery        Allergies:pregabalin (Unknown)  PC Pen VK (Rash)  latex (Unknown)  penicillin (Hives; Urticaria)      Labs:                        7.7    7.63  )-----------( 279      ( 03 Jan 2025 13:48 )             26.6   01-03    136  |  106  |  18  ----------------------------<  82  4.4   |  28  |  0.78    Ca    8.6      03 Jan 2025 13:48    TPro  7.5  /  Alb  2.9[L]  /  TBili  0.2  /  DBili  x   /  AST  8[L]  /  ALT  12  /  AlkPhos  99  01-03      Vital Signs Last 24 Hrs  T(C): 36.4 (03 Jan 2025 15:56), Max: 36.8 (03 Jan 2025 13:14)  T(F): 97.6 (03 Jan 2025 15:56), Max: 98.2 (03 Jan 2025 13:14)  HR: 51 (03 Jan 2025 15:56) (50 - 51)  BP: 140/65 (03 Jan 2025 15:56) (127/40 - 140/65)  BP(mean): 64 (03 Jan 2025 13:14) (64 - 64)  RR: 17 (03 Jan 2025 15:56) (17 - 18)  SpO2: 99% (03 Jan 2025 15:56) (99% - 100%)    Parameters below as of 03 Jan 2025 15:56  Patient On (Oxygen Delivery Method): room air    REVIEW OF SYSTEMS:    CONSTITUTIONAL: No weakness, fevers or chills  EYES: No visual changes  RESPIRATORY: No cough, wheezing; No shortness of breath  CARDIOVASCULAR: No chest pain or palpitations  GASTROINTESTINAL: No abdominal or epigastric pain. No nausea, vomiting; No diarrhea or constipation.   GENITOURINARY: No dysuria, frequency or hematuria  NEUROLOGICAL: No numbness or weakness  SKIN: See physical examination.  All other review of systems is negative unless indicated above    Physical Exam:   Constitutional: NAD, alert;  Lower Extremity Focus  Derm:  Skin warm, dry and supple bilateral.  Ulceration Right heel wound, partial thickness in nature, wound base granular wound size (approx 1.5 cm X 0.5cm X 0.1cm) and surrounding predominant hyperkeratotic tissue, no stephen-wound erythema/edema, no pus/purulence, no crepitus/fluctuance, no tracking/tunneling, no probe to bone.  Partial thickness ulceration noted to posterior medial Left lower midleg, some serous drainage, superficial wound bed, no malodor,  no crepitus/fluctuance, no tracking/tunneling, no PTB.   Vascular: Dorsalis Pedis and Posterior Tibial pulses weakly palpable  Neuro: Protective sensation intact to the level of the digits bilateral.  MSK: Muscle strength 5/5 all major muscle groups bilateral. Pt is minimally ambulatory

## 2025-01-03 NOTE — ED STATDOCS - ATTENDING APP SHARED VISIT CONTRIBUTION OF CARE
I, Jones Farrell MD, personally saw the patient with ACP.  I have personally performed a face to face diagnostic evaluation on this patient.   The initial assessment was performed by myself and then the patient was handed off to the ACP. The patient was followed and re-evaluated by the ACP. All labs, imaging and procedures were evaluated and performed by the ACP and I was available for consultation if any questions in the patients care came up.   I personally made/approved the management plan and take responsibility for the patient management.

## 2025-01-04 LAB
ANION GAP SERPL CALC-SCNC: 4 MMOL/L — LOW (ref 5–17)
BASOPHILS # BLD AUTO: 0.07 K/UL — SIGNIFICANT CHANGE UP (ref 0–0.2)
BASOPHILS NFR BLD AUTO: 0.9 % — SIGNIFICANT CHANGE UP (ref 0–2)
BUN SERPL-MCNC: 19 MG/DL — SIGNIFICANT CHANGE UP (ref 7–23)
CALCIUM SERPL-MCNC: 8.9 MG/DL — SIGNIFICANT CHANGE UP (ref 8.5–10.1)
CHLORIDE SERPL-SCNC: 107 MMOL/L — SIGNIFICANT CHANGE UP (ref 96–108)
CO2 SERPL-SCNC: 26 MMOL/L — SIGNIFICANT CHANGE UP (ref 22–31)
CREAT SERPL-MCNC: 0.75 MG/DL — SIGNIFICANT CHANGE UP (ref 0.5–1.3)
EGFR: 83 ML/MIN/1.73M2 — SIGNIFICANT CHANGE UP
EOSINOPHIL # BLD AUTO: 1.02 K/UL — HIGH (ref 0–0.5)
EOSINOPHIL NFR BLD AUTO: 13.8 % — HIGH (ref 0–6)
GLUCOSE SERPL-MCNC: 107 MG/DL — HIGH (ref 70–99)
HCT VFR BLD CALC: 24.4 % — LOW (ref 34.5–45)
HGB BLD-MCNC: 7.1 G/DL — LOW (ref 11.5–15.5)
IMM GRANULOCYTES NFR BLD AUTO: 0.3 % — SIGNIFICANT CHANGE UP (ref 0–0.9)
INR BLD: 1.48 RATIO — HIGH (ref 0.85–1.16)
LYMPHOCYTES # BLD AUTO: 1.42 K/UL — SIGNIFICANT CHANGE UP (ref 1–3.3)
LYMPHOCYTES # BLD AUTO: 19.2 % — SIGNIFICANT CHANGE UP (ref 13–44)
MAGNESIUM SERPL-MCNC: 1.7 MG/DL — SIGNIFICANT CHANGE UP (ref 1.6–2.6)
MCHC RBC-ENTMCNC: 24.8 PG — LOW (ref 27–34)
MCHC RBC-ENTMCNC: 29.1 G/DL — LOW (ref 32–36)
MCV RBC AUTO: 85.3 FL — SIGNIFICANT CHANGE UP (ref 80–100)
MONOCYTES # BLD AUTO: 0.81 K/UL — SIGNIFICANT CHANGE UP (ref 0–0.9)
MONOCYTES NFR BLD AUTO: 11 % — SIGNIFICANT CHANGE UP (ref 2–14)
NEUTROPHILS # BLD AUTO: 4.05 K/UL — SIGNIFICANT CHANGE UP (ref 1.8–7.4)
NEUTROPHILS NFR BLD AUTO: 54.8 % — SIGNIFICANT CHANGE UP (ref 43–77)
PHOSPHATE SERPL-MCNC: 4.1 MG/DL — SIGNIFICANT CHANGE UP (ref 2.5–4.5)
PLATELET # BLD AUTO: 253 K/UL — SIGNIFICANT CHANGE UP (ref 150–400)
POTASSIUM SERPL-MCNC: 4 MMOL/L — SIGNIFICANT CHANGE UP (ref 3.5–5.3)
POTASSIUM SERPL-SCNC: 4 MMOL/L — SIGNIFICANT CHANGE UP (ref 3.5–5.3)
PROTHROM AB SERPL-ACNC: 16.9 SEC — HIGH (ref 9.9–13.4)
RBC # BLD: 2.86 M/UL — LOW (ref 3.8–5.2)
RBC # FLD: 16.8 % — HIGH (ref 10.3–14.5)
SODIUM SERPL-SCNC: 137 MMOL/L — SIGNIFICANT CHANGE UP (ref 135–145)
WBC # BLD: 7.39 K/UL — SIGNIFICANT CHANGE UP (ref 3.8–10.5)
WBC # FLD AUTO: 7.39 K/UL — SIGNIFICANT CHANGE UP (ref 3.8–10.5)

## 2025-01-04 PROCEDURE — 99223 1ST HOSP IP/OBS HIGH 75: CPT

## 2025-01-04 PROCEDURE — 99232 SBSQ HOSP IP/OBS MODERATE 35: CPT | Mod: GC

## 2025-01-04 RX ORDER — EZETIMIBE 10 MG/1
10 TABLET ORAL DAILY
Refills: 0 | Status: DISCONTINUED | OUTPATIENT
Start: 2025-01-04 | End: 2025-01-14

## 2025-01-04 RX ORDER — ONDANSETRON 4 MG/1
4 TABLET ORAL EVERY 6 HOURS
Refills: 0 | Status: DISCONTINUED | OUTPATIENT
Start: 2025-01-04 | End: 2025-01-14

## 2025-01-04 RX ORDER — VANCOMYCIN HYDROCHLORIDE 5 G/100ML
750 INJECTION, POWDER, LYOPHILIZED, FOR SOLUTION INTRAVENOUS EVERY 12 HOURS
Refills: 0 | Status: DISCONTINUED | OUTPATIENT
Start: 2025-01-04 | End: 2025-01-10

## 2025-01-04 RX ORDER — METRONIDAZOLE 250 MG/1
500 TABLET ORAL EVERY 8 HOURS
Refills: 0 | Status: DISCONTINUED | OUTPATIENT
Start: 2025-01-04 | End: 2025-01-10

## 2025-01-04 RX ORDER — ACETAMINOPHEN 80 MG/.8ML
1000 SOLUTION/ DROPS ORAL ONCE
Refills: 0 | Status: COMPLETED | OUTPATIENT
Start: 2025-01-04 | End: 2025-01-04

## 2025-01-04 RX ADMIN — DILTIAZEM HYDROCHLORIDE 120 MILLIGRAM(S): 300 CAPSULE, COATED, EXTENDED RELEASE ORAL at 10:26

## 2025-01-04 RX ADMIN — DAPAGLIFLOZIN 5 MILLIGRAM(S): 5 TABLET, FILM COATED ORAL at 10:19

## 2025-01-04 RX ADMIN — Medication 5 MILLIGRAM(S): at 10:20

## 2025-01-04 RX ADMIN — AMIODARONE HYDROCHLORIDE 200 MILLIGRAM(S): 200 TABLET ORAL at 10:25

## 2025-01-04 RX ADMIN — Medication 40 MILLIGRAM(S): at 10:26

## 2025-01-04 RX ADMIN — CLINDAMYCIN HYDROCHLORIDE 100 MILLIGRAM(S): 300 CAPSULE ORAL at 06:26

## 2025-01-04 RX ADMIN — VANCOMYCIN HYDROCHLORIDE 250 MILLIGRAM(S): 5 INJECTION, POWDER, LYOPHILIZED, FOR SOLUTION INTRAVENOUS at 21:12

## 2025-01-04 RX ADMIN — GABAPENTIN 800 MILLIGRAM(S): 300 CAPSULE ORAL at 06:26

## 2025-01-04 RX ADMIN — SPIRONOLACTONE 25 MILLIGRAM(S): 50 TABLET ORAL at 10:25

## 2025-01-04 RX ADMIN — Medication 2000 MILLIGRAM(S): at 10:21

## 2025-01-04 RX ADMIN — VANCOMYCIN HYDROCHLORIDE 250 MILLIGRAM(S): 5 INJECTION, POWDER, LYOPHILIZED, FOR SOLUTION INTRAVENOUS at 13:03

## 2025-01-04 RX ADMIN — METRONIDAZOLE 100 MILLIGRAM(S): 250 TABLET ORAL at 22:51

## 2025-01-04 RX ADMIN — Medication 5 MILLIGRAM(S): at 11:30

## 2025-01-04 RX ADMIN — METRONIDAZOLE 100 MILLIGRAM(S): 250 TABLET ORAL at 14:17

## 2025-01-04 RX ADMIN — Medication 2000 MILLIGRAM(S): at 21:17

## 2025-01-04 RX ADMIN — CLOPIDOGREL BISULFATE 75 MILLIGRAM(S): 75 TABLET, FILM COATED ORAL at 10:19

## 2025-01-04 RX ADMIN — ACETAMINOPHEN 400 MILLIGRAM(S): 80 SOLUTION/ DROPS ORAL at 23:04

## 2025-01-04 RX ADMIN — GABAPENTIN 800 MILLIGRAM(S): 300 CAPSULE ORAL at 14:17

## 2025-01-04 NOTE — PROGRESS NOTE ADULT - ASSESSMENT
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  Medicine consulted for comanagement.    #R groin surgical site infection  patient under vasc service seen by infectious disease today  had purulence as well  on vanco cefepime flagyl  -care per primary, ID     #chronic diastolic CHF  patients tte last month:  LV cavity moderately dilated, normal wall thickness, normal LVEF, severe (grade 3) LV diastolic dysfunction, severe LA dilatation, mod to moderate RA dilatation, normal RV size and function.   no acute signs of HF at this time  on po lasix - continue at this dosage  -monitor fluid status  -c/w metoprolol ER, furosemide PO, spironolactone.   -resume Farxiga on discharge   -Monitor weights    #Hx of Afib s/p ablation  -c/w Cardizem CD and amiodarone.   patients xarelto being held - planned for procedure next week   -ac per primary team ahead of surgical scheduling      #HLD  -restart home Zetia    #anemia  patient with hg of 7.1 today -- baseline 7s to 8s. normocytic. no signs or sx of bleeding at this time  -wound transfuse for hg <7 or any active bleeding  -keep active t&s  -f/u iron studies

## 2025-01-04 NOTE — DIETITIAN INITIAL EVALUATION ADULT - ADD RECOMMEND
1) Liberalize diet to regular per patient request  2) MVI w/ minerals daily to ensure 100% RDA met   3) Monitor bowel movements, if no BM for >3 days, consider implementing bowel regimen.   4) Obtain weekly wt to track/trend changes   5) Confirm goals of care regarding nutrition support   RD will continue to monitor PO intake, labs, hydration, and wt prn.

## 2025-01-04 NOTE — PROGRESS NOTE ADULT - SUBJECTIVE AND OBJECTIVE BOX
Problem: Discharge Planning  Goal: Discharge to home or other facility with appropriate resources  10/31/2022 1046 by Shubham Diehl RN  Outcome: Completed  10/31/2022 0355 by Keven Suresh RN  Outcome: Progressing     Problem: Safety - Adult  Goal: Free from fall injury  10/31/2022 1046 by Shubham Dielh RN  Outcome: Completed  10/31/2022 0355 by Keven Suresh RN  Outcome: Progressing  Note: No falls noted this shift. Patient ambulates with x1 staff assistance without difficulty. Bed kept in low position. Safe environment maintained. Bedside table & call light in reach. Uses call light appropriately when needing assistance. Problem: ABCDS Injury Assessment  Goal: Absence of physical injury  Outcome: Completed     Problem: Cardiovascular - Adult  Goal: Maintains optimal cardiac output and hemodynamic stability  10/31/2022 1046 by Shubham Diehl RN  Outcome: Completed  10/31/2022 0355 by Keven Suresh RN  Outcome: Progressing  Note: Patient maintained BP WDL throughout shift. Goal: Absence of cardiac dysrhythmias or at baseline  Outcome: Completed     Problem: Chronic Conditions and Co-morbidities  Goal: Patient's chronic conditions and co-morbidity symptoms are monitored and maintained or improved  Outcome: Completed     Problem: Nutrition Deficit:  Goal: Optimize nutritional status  Outcome: Completed     Problem: Pain  Goal: Verbalizes/displays adequate comfort level or baseline comfort level  10/31/2022 1046 by Shubham Diehl RN  Outcome: Completed  10/31/2022 0355 by Keven Suresh RN  Outcome: Progressing  Note: No pain at this time. 0/10 pain scale. Problem: Skin/Tissue Integrity  Goal: Absence of new skin breakdown  10/31/2022 1046 by Shubham Diehl RN  Outcome: Completed  10/31/2022 0355 by Keven Suresh RN  Outcome: Progressing  Note: Multiple skin abnormalities noted upon assessment of pt. No new skin breakdown evident. Will continue to monitor for changes in skin integrity. CC: Patient is a 74y old  Female who presents with a chief complaint of Infection following a procedure, other surgical site, initial encounter     (04 Jan 2025 11:41)      INTERVAL EVENTS: LINDA    SUBJECTIVE / INTERVAL HPI: Patient seen and examined at bedside.     ROS: negative unless otherwise stated above.    VITAL SIGNS:  Vital Signs Last 24 Hrs  T(C): 36.8 (04 Jan 2025 08:26), Max: 36.8 (03 Jan 2025 18:02)  T(F): 98.2 (04 Jan 2025 08:26), Max: 98.2 (03 Jan 2025 18:02)  HR: 55 (04 Jan 2025 10:27) (50 - 59)  BP: 120/64 (04 Jan 2025 10:27) (108/40 - 140/65)  BP(mean): --  RR: 18 (04 Jan 2025 08:26) (17 - 18)  SpO2: 96% (04 Jan 2025 08:26) (94% - 100%)    Parameters below as of 03 Jan 2025 22:32  Patient On (Oxygen Delivery Method): room air            PHYSICAL EXAM:    General: NAD  HEENT: MMM  Neck: supple  Cardiovascular: +S1/S2; RRR  Respiratory: CTA B/L; no W/R/R  Gastrointestinal: soft, NT/ND  Extremities: WWP; no edema, clubbing or cyanosis  Vascular: 2+ radial, DP/PT pulses B/L  Neurological: AAOx3; no focal deficits    MEDICATIONS:  MEDICATIONS  (STANDING):  aMIOdarone    Tablet 200 milliGRAM(s) Oral daily  cefepime  Injectable. 2000 milliGRAM(s) IV Push every 12 hours  clopidogrel Tablet 75 milliGRAM(s) Oral daily  collagenase Ointment 1 Application(s) Topical once  dapagliflozin 5 milliGRAM(s) Oral daily  diltiazem    milliGRAM(s) Oral daily  ezetimibe 10 milliGRAM(s) Oral daily  furosemide    Tablet 40 milliGRAM(s) Oral daily  gabapentin 800 milliGRAM(s) Oral three times a day  influenza  Vaccine (HIGH DOSE) 0.5 milliLiter(s) IntraMuscular once  metoprolol succinate ER 25 milliGRAM(s) Oral daily  metroNIDAZOLE  IVPB 500 milliGRAM(s) IV Intermittent every 8 hours  spironolactone 25 milliGRAM(s) Oral daily  vancomycin  IVPB 750 milliGRAM(s) IV Intermittent every 12 hours    MEDICATIONS  (PRN):  acetaminophen     Tablet .. 650 milliGRAM(s) Oral every 6 hours PRN Mild Pain (1 - 3)  oxyCODONE    IR 5 milliGRAM(s) Oral every 8 hours PRN Severe Pain (7 - 10)  zolpidem 10 milliGRAM(s) Oral at bedtime PRN Insomnia      ALLERGIES:  Allergies    pregabalin (Unknown)  PC Pen VK (Rash)  latex (Unknown)  penicillin (Hives; Urticaria)    Intolerances        LABS:                        7.1    7.39  )-----------( 253      ( 04 Jan 2025 07:16 )             24.4     01-04    137  |  107  |  19  ----------------------------<  107[H]  4.0   |  26  |  0.75    Ca    8.9      04 Jan 2025 07:16  Phos  4.1     01-04  Mg     1.7     01-04    TPro  7.5  /  Alb  2.9[L]  /  TBili  0.2  /  DBili  x   /  AST  8[L]  /  ALT  12  /  AlkPhos  99  01-03    PT/INR - ( 04 Jan 2025 07:16 )   PT: 16.9 sec;   INR: 1.48 ratio         PTT - ( 03 Jan 2025 13:48 )  PTT:48.9 sec  Urinalysis Basic - ( 04 Jan 2025 07:16 )    Color: x / Appearance: x / SG: x / pH: x  Gluc: 107 mg/dL / Ketone: x  / Bili: x / Urobili: x   Blood: x / Protein: x / Nitrite: x   Leuk Esterase: x / RBC: x / WBC x   Sq Epi: x / Non Sq Epi: x / Bacteria: x      CAPILLARY BLOOD GLUCOSE          RADIOLOGY & ADDITIONAL TESTS: Reviewed.

## 2025-01-04 NOTE — DIETITIAN INITIAL EVALUATION ADULT - PERTINENT LABORATORY DATA
01-04    137  |  107  |  19  ----------------------------<  107[H]  4.0   |  26  |  0.75    Ca    8.9      04 Jan 2025 07:16  Phos  4.1     01-04  Mg     1.7     01-04    TPro  7.5  /  Alb  2.9[L]  /  TBili  0.2  /  DBili  x   /  AST  8[L]  /  ALT  12  /  AlkPhos  99  01-03  A1C with Estimated Average Glucose Result: 5.8 % (04-30-24 @ 05:44)

## 2025-01-04 NOTE — PROGRESS NOTE ADULT - SUBJECTIVE AND OBJECTIVE BOX
SURGERY DAILY PROGRESS NOTE:     Subjective:  Patient seen and examined at bedside during am rounds. AVSS. Denies any fevers, chills, n/v/d, chest pain or shortness of breath    Objective:    MEDICATIONS  (STANDING):  aMIOdarone    Tablet 200 milliGRAM(s) Oral daily  clindamycin IVPB 600 milliGRAM(s) IV Intermittent every 8 hours  clopidogrel Tablet 75 milliGRAM(s) Oral daily  collagenase Ointment 1 Application(s) Topical once  dapagliflozin 5 milliGRAM(s) Oral daily  diltiazem    milliGRAM(s) Oral daily  furosemide    Tablet 40 milliGRAM(s) Oral daily  gabapentin 800 milliGRAM(s) Oral three times a day  influenza  Vaccine (HIGH DOSE) 0.5 milliLiter(s) IntraMuscular once  metoprolol succinate ER 25 milliGRAM(s) Oral daily  spironolactone 25 milliGRAM(s) Oral daily    MEDICATIONS  (PRN):  acetaminophen     Tablet .. 650 milliGRAM(s) Oral every 6 hours PRN Mild Pain (1 - 3)  oxyCODONE    IR 5 milliGRAM(s) Oral every 8 hours PRN Severe Pain (7 - 10)  zolpidem 10 milliGRAM(s) Oral at bedtime PRN Insomnia      Vital Signs Last 24 Hrs  T(C): 36.7 (03 Jan 2025 22:32), Max: 36.8 (03 Jan 2025 13:14)  T(F): 98 (03 Jan 2025 22:32), Max: 98.2 (03 Jan 2025 13:14)  HR: 55 (03 Jan 2025 22:32) (50 - 55)  BP: 108/40 (03 Jan 2025 22:32) (108/40 - 140/65)  BP(mean): 64 (03 Jan 2025 13:14) (64 - 64)  RR: 18 (03 Jan 2025 22:32) (17 - 18)  SpO2: 94% (03 Jan 2025 22:32) (94% - 100%)    Parameters below as of 03 Jan 2025 22:32  Patient On (Oxygen Delivery Method): room air          PHYSICAL EXAM   Gen: well-appearing, in no acute distress  CV: pulse regularly present   Resp: airway patent, non-labored breathing  Abd: soft, NTND; no rebound or guarding.  Ext.+2 left fem, 1+ right fem pulses, doppler signals of b/l DP biphasic and b/l PT monophasic. Rt foot dressing c/d/i  Skin: R groin wound at the prior incision site with slough, no obvious drainage. B/l LE stasis dermatitis.       I&O's Detail      Daily Height in cm: 177.8 (03 Jan 2025 12:46)    Daily     LABS:                        7.7    7.63  )-----------( 279      ( 03 Jan 2025 13:48 )             26.6     01-03    136  |  106  |  18  ----------------------------<  82  4.4   |  28  |  0.78    Ca    8.6      03 Jan 2025 13:48    TPro  7.5  /  Alb  2.9[L]  /  TBili  0.2  /  DBili  x   /  AST  8[L]  /  ALT  12  /  AlkPhos  99  01-03    PT/INR - ( 03 Jan 2025 13:48 )   PT: 39.0 sec;   INR: 3.34 ratio         PTT - ( 03 Jan 2025 13:48 )  PTT:48.9 sec  Urinalysis Basic - ( 03 Jan 2025 13:48 )    Color: x / Appearance: x / SG: x / pH: x  Gluc: 82 mg/dL / Ketone: x  / Bili: x / Urobili: x   Blood: x / Protein: x / Nitrite: x   Leuk Esterase: x / RBC: x / WBC x   Sq Epi: x / Non Sq Epi: x / Bacteria: x

## 2025-01-04 NOTE — DIETITIAN INITIAL EVALUATION ADULT - OTHER INFO
74-year-old F, with PMHx of DM, TAVR 8/2024 on Plavix , A-fib on Xarelto, CHF, HLD, PVD, b/l LE venous insufficiency, bilateral CFA stenoses s/p Rt SFA endarterectomy on 12/17/24 presenting with purulent drainage at the R groin surgical site. She report of subjective fevers. She was seen recently at [facility] and was provided with oral abx and local wound care but symptoms have not improved.   Admitted for Infection following a procedure, other surgical site, initial encounter    Known to nutrition services with criteria for moderate PCM met on 12/5/24.  Prescribed a DASH/TLC diet at .  Patient insisted she does not need a therapeutic diet and wanted to have it discontinued.  RD advised will recommend a regular diet to honor patient wishes.  Patient declined nutrition education.  Based on bed scale wt obtained by RD on 1/4 Pt wt is 231.5# - noted with 2+ edema to right foot and 3+ to left foot, could be masking losses, skewing appearance.  Weight history reviewed: 5/6/24 252# (by RD).  Loss of 20.5#8.8% x 8 mo, not clin sig.  Patient appears obese, NFPE negative for findings.  Multiple skin alterations noted r/t surgery and venous wound however no PI noted.  See additional recommendations below.

## 2025-01-04 NOTE — DIETITIAN INITIAL EVALUATION ADULT - PERTINENT MEDS FT
MEDICATIONS  (STANDING):  aMIOdarone    Tablet 200 milliGRAM(s) Oral daily  cefepime  Injectable. 2000 milliGRAM(s) IV Push every 12 hours  clopidogrel Tablet 75 milliGRAM(s) Oral daily  collagenase Ointment 1 Application(s) Topical once  dapagliflozin 5 milliGRAM(s) Oral daily  diltiazem    milliGRAM(s) Oral daily  furosemide    Tablet 40 milliGRAM(s) Oral daily  gabapentin 800 milliGRAM(s) Oral three times a day  influenza  Vaccine (HIGH DOSE) 0.5 milliLiter(s) IntraMuscular once  metoprolol succinate ER 25 milliGRAM(s) Oral daily  metroNIDAZOLE  IVPB 500 milliGRAM(s) IV Intermittent every 8 hours  spironolactone 25 milliGRAM(s) Oral daily  vancomycin  IVPB 750 milliGRAM(s) IV Intermittent every 12 hours    MEDICATIONS  (PRN):  acetaminophen     Tablet .. 650 milliGRAM(s) Oral every 6 hours PRN Mild Pain (1 - 3)  oxyCODONE    IR 5 milliGRAM(s) Oral every 8 hours PRN Severe Pain (7 - 10)  zolpidem 10 milliGRAM(s) Oral at bedtime PRN Insomnia

## 2025-01-04 NOTE — CONSULT NOTE ADULT - ASSESSMENT
74-year-old Female with h/o DM type 2, TAVR 8/2024 on Plavix , A-fib on Xarelto, CHF, HLD, PVD, b/l LE venous insufficiency, bilateral CFA stenoses s/p Rt SFA endarterectomy on 12/17/24 was admitted on 1/3 with purulent drainage at the R groin surgical site. She reported subjective fevers. She was seen recently at [facility] and was treated with oral abx and local wound care,  but symptoms have not improved. In ER she received clindamycin IV.    #Right inguinal cellulitis/ postsurgical infection s/p right SFA endarterectomy ?graft in place?  #Right inguinal collection ?abscess ?hematoma?  #Febrile syndrome  #Allergy to PCN  -concern about an infection multidrug resistant organisms was raised since she failed outpatient abx therapy  -obtain wound c/s  -concern for infection with multiresistant organisms is raised. Prior cultures reviewed. An epidemiologic assessment was performed. The risk of the microorganism spread to family members, healthcare staff is low. Standard isolation in place at present time. Will reconsider isolation measures according to infection control policy, further culture results and other new information. The patient will be monitored closely, cultures collected as appropriate. Broad spectrum abx therapy was started.  -start vancomycin 750 mg IV q12h, cefepime 2 gm IV q12h and metronidazole 500 mg IV q8h  -reason for abx use and side effects reviewed with patient; monitor BMP and vancomycin trough levels   -monitor closely in ej of PCN allergy history  -surgical evaluation ?I and D  -local wound care  -old chart reviewed to assess prior cultures  -monitor temps  -f/u CBC  -supportive care  2. Other issues:   -care per medicine    Clinical team may change from intravenous to oral antibiotics when the following criteria are met:   1. Patient is clinically improving/stable       a)	Improved signs and symptoms of infection from initial presentation       b)	Afebrile for 24 hours       c)	Leukocytosis trending towards normal range   2. Patient is tolerating oral intake   3. Initial/repeat blood cultures are negative OR do not need to wait for preliminary blood cultures to result    When above criteria met OR on date, may change iv antibiotics to an oral regimen vs continuing IV abx therapy  Cannot advise changing to oral antibiotic therapy until culture sensitivity is available.     74-year-old Female with h/o DM type 2, TAVR 8/2024 on Plavix , A-fib on Xarelto, CHF, HLD, PVD, b/l LE venous insufficiency, bilateral CFA stenoses s/p Rt SFA endarterectomy on 12/17/24 was admitted on 1/3 with purulent drainage at the R groin surgical site. She reported subjective fevers. She was seen recently at [facility] and was treated with oral abx and local wound care,  but symptoms have not improved. In ER she received clindamycin IV.    #Right inguinal cellulitis/ postsurgical infection s/p right SFA endarterectomy ?graft in place?  #Right inguinal collection ?abscess ?hematoma?  #Febrile syndrome  #Allergy to PCN  -concern about an infection multidrug resistant organisms was raised since she failed outpatient abx therapy  -obtain wound c/s if drainage  -concern for infection with multiresistant organisms is raised. Prior cultures reviewed. An epidemiologic assessment was performed. The risk of the microorganism spread to family members, healthcare staff is low. Standard isolation in place at present time. Will reconsider isolation measures according to infection control policy, further culture results and other new information. The patient will be monitored closely, cultures collected as appropriate. Broad spectrum abx therapy was started.  -start vancomycin 750 mg IV q12h, cefepime 2 gm IV q12h and metronidazole 500 mg IV q8h  -reason for abx use and side effects reviewed with patient; monitor BMP and vancomycin trough levels   -monitor closely in ej of PCN allergy history  -surgical evaluation ?I and D  -local wound care  -old chart reviewed to assess prior cultures  -monitor temps  -f/u CBC  -supportive care  2. Other issues:   -care per medicine    Clinical team may change from intravenous to oral antibiotics when the following criteria are met:   1. Patient is clinically improving/stable       a)	Improved signs and symptoms of infection from initial presentation       b)	Afebrile for 24 hours       c)	Leukocytosis trending towards normal range   2. Patient is tolerating oral intake   3. Initial/repeat blood cultures are negative OR do not need to wait for preliminary blood cultures to result    When above criteria met OR on date, may change iv antibiotics to an oral regimen vs continuing IV abx therapy  Cannot advise changing to oral antibiotic therapy until culture sensitivity is available.     74-year-old Female with h/o DM type 2, TAVR 8/2024 on Plavix , A-fib on Xarelto, CHF, HLD, PVD, b/l LE venous insufficiency, bilateral CFA stenoses s/p Rt SFA endarterectomy on 12/17/24 was admitted on 1/3 with purulent drainage at the R groin surgical site. She reported subjective fevers. She was seen recently at [facility] and was treated with oral abx and local wound care,  but symptoms have not improved. In ER she received clindamycin IV.    #Right inguinal cellulitis/ postsurgical infection s/p right SFA endarterectomy ?graft in place?  #Right inguinal collection ?abscess ?hematoma?  #PVD  #Febrile syndrome  #Allergy to PCN  -concern about an infection multidrug resistant organisms was raised since she failed outpatient abx therapy  -obtain wound c/s if drainage  -concern for infection with multiresistant organisms is raised. Prior cultures reviewed. An epidemiologic assessment was performed. The risk of the microorganism spread to family members, healthcare staff is low. Standard isolation in place at present time. Will reconsider isolation measures according to infection control policy, further culture results and other new information. The patient will be monitored closely, cultures collected as appropriate. Broad spectrum abx therapy was started.  -start vancomycin 750 mg IV q12h, cefepime 2 gm IV q12h and metronidazole 500 mg IV q8h  -reason for abx use and side effects reviewed with patient; monitor BMP and vancomycin trough levels   -monitor closely in ej of PCN allergy history  -surgical evaluation ?I and D  -local wound care  -old chart reviewed to assess prior cultures  -monitor temps  -f/u CBC  -supportive care  2. Other issues:   -care per medicine    Clinical team may change from intravenous to oral antibiotics when the following criteria are met:   1. Patient is clinically improving/stable       a)	Improved signs and symptoms of infection from initial presentation       b)	Afebrile for 24 hours       c)	Leukocytosis trending towards normal range   2. Patient is tolerating oral intake   3. Initial/repeat blood cultures are negative OR do not need to wait for preliminary blood cultures to result    When above criteria met OR on date, may change iv antibiotics to an oral regimen vs continuing IV abx therapy  Cannot advise changing to oral antibiotic therapy until culture sensitivity is available.

## 2025-01-04 NOTE — DIETITIAN INITIAL EVALUATION ADULT - ORAL INTAKE PTA/DIET HISTORY
Reports she lives with her  and she does the shopping and cooking.  Endorses 2-3 meals/day.  Does not follow a specific diet at home but reports she "knows how to eat".  Likely meeting % ENN.

## 2025-01-04 NOTE — PROGRESS NOTE ADULT - ASSESSMENT
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       PLAN  IV abx   local wound care with betadine painting bd  ID consult   Medical consult for co-management   Podiatry consult   Resume home meds   Will plan for wound washout and closure with flap next week.

## 2025-01-04 NOTE — CONSULT NOTE ADULT - SUBJECTIVE AND OBJECTIVE BOX
Patient is a 74y old  Female who presents with a chief complaint of groin wound    HPI:  74-year-old Female with h/o DM type 2, TAVR 8/2024 on Plavix , A-fib on Xarelto, CHF, HLD, PVD, b/l LE venous insufficiency, bilateral CFA stenoses s/p Rt SFA endarterectomy on 12/17/24 was admitted on 1/3 with purulent drainage at the R groin surgical site. She reported subjective fevers. She was seen recently at [facility] and was treated with oral abx and local wound care,  but symptoms have not improved. In ER she received clindamycin IV.    PMH: as above  PSH: as above  Meds: per reconciliation sheet, noted below  MEDICATIONS  (STANDING):  aMIOdarone    Tablet 200 milliGRAM(s) Oral daily  clindamycin IVPB 600 milliGRAM(s) IV Intermittent every 8 hours  clopidogrel Tablet 75 milliGRAM(s) Oral daily  collagenase Ointment 1 Application(s) Topical once  dapagliflozin 5 milliGRAM(s) Oral daily  diltiazem    milliGRAM(s) Oral daily  furosemide    Tablet 40 milliGRAM(s) Oral daily  gabapentin 800 milliGRAM(s) Oral three times a day  influenza  Vaccine (HIGH DOSE) 0.5 milliLiter(s) IntraMuscular once  metoprolol succinate ER 25 milliGRAM(s) Oral daily  spironolactone 25 milliGRAM(s) Oral daily    MEDICATIONS  (PRN):  acetaminophen     Tablet .. 650 milliGRAM(s) Oral every 6 hours PRN Mild Pain (1 - 3)  oxyCODONE    IR 5 milliGRAM(s) Oral every 8 hours PRN Severe Pain (7 - 10)  zolpidem 10 milliGRAM(s) Oral at bedtime PRN Insomnia    Allergies    pregabalin (Unknown)  PC Pen VK (Rash)  latex (Unknown)  penicillin (Hives; Urticaria)    Intolerances      Social: no smoking, no alcohol, no illegal drugs; no recent travel, no exposure to TB  FAMILY HISTORY:  Family history of breast cancer in mother  Family history of diabetes mellitus in mother  FHx: heart disease (Sibling)    ROS: the patient denies fever, no chills, no HA, no seizures, no dizziness, no sore throat, no nasal congestion, no blurry vision, no CP, no palpitations, no SOB, no cough, no abdominal pain, no diarrhea, no N/V, no dysuria, no leg pain, has right groin draining wound, no rash, no joint aches, no rectal pain or bleeding, no night sweats  All other systems reviewed and are negative    Vital Signs Last 24 Hrs  T(C): 36.8 (04 Jan 2025 08:26), Max: 36.8 (03 Jan 2025 13:14)  T(F): 98.2 (04 Jan 2025 08:26), Max: 98.2 (03 Jan 2025 13:14)  HR: 59 (04 Jan 2025 08:26) (50 - 59)  BP: 119/51 (04 Jan 2025 08:26) (108/40 - 140/65)  BP(mean): 64 (03 Jan 2025 13:14) (64 - 64)  RR: 18 (04 Jan 2025 08:26) (17 - 18)  SpO2: 96% (04 Jan 2025 08:26) (94% - 100%)    Parameters below as of 03 Jan 2025 22:32  Patient On (Oxygen Delivery Method): room air      Daily Height in cm: 177.8 (03 Jan 2025 12:46)    Daily     PE:    Constitutional:  No acute distress  HEENT: NC/AT, EOMI, PERRLA, conjunctivae clear; ears and nose atraumatic; pharynx benign  Neck: supple; thyroid not palpable  Back: no tenderness  Respiratory: respiratory effort normal; clear to auscultation  Cardiovascular: S1S2 regular, no murmurs  Abdomen: soft, not tender, not distended, positive BS; no liver or spleen organomegaly  Genitourinary: no suprapubic tenderness  Lymphatic: no LN palpable  Musculoskeletal: no muscle tenderness, no joint swelling or tenderness  Extremities: no pedal edema  Right inguinal draining wound  Neurological/ Psychiatric: AxOx3, judgement and insight normal; moving all extremities  Skin: no rashes; no palpable lesions    Labs: all available labs reviewed                        7.1    7.39  )-----------( 253      ( 04 Jan 2025 07:16 )             24.4     01-04    137  |  107  |  19  ----------------------------<  107[H]  4.0   |  26  |  0.75    Ca    8.9      04 Jan 2025 07:16  Phos  4.1     01-04  Mg     1.7     01-04    TPro  7.5  /  Alb  2.9[L]  /  TBili  0.2  /  DBili  x   /  AST  8[L]  /  ALT  12  /  AlkPhos  99  01-03     LIVER FUNCTIONS - ( 03 Jan 2025 13:48 )  Alb: 2.9 g/dL / Pro: 7.5 gm/dL / ALK PHOS: 99 U/L / ALT: 12 U/L / AST: 8 U/L / GGT: x           Radiology: all available radiological tests reviewed    < from: CT Abdomen and Pelvis w/ IV Cont (12.31.24 @ 04:55) >  10 cm in greatest dimension fluid collection superficial to the right common femoral vessels. Given the history this most likely represents an abscess. Subacute hematoma, seroma might also appear this way.  Biliary ductal dilation which has increased slightly. While most likely compensatory and nonobstructive in this patient status post cholecystectomy, obstruction remains possible though less likely.  < end of copied text >    Advanced directives addressed: full resuscitation Patient is a 74y old  Female who presents with a chief complaint of groin wound    HPI:  74-year-old Female with h/o DM type 2, TAVR 8/2024 on Plavix , A-fib on Xarelto, CHF, HLD, PVD, b/l LE venous insufficiency, bilateral CFA stenoses s/p Rt SFA endarterectomy on 12/17/24 was admitted on 1/3 with purulent drainage at the R groin surgical site. She reported subjective fevers. She was seen recently at [facility] and was treated with oral abx and local wound care,  but symptoms have not improved. In ER she received clindamycin IV.    PMH: as above  PSH: as above  Meds: per reconciliation sheet, noted below  MEDICATIONS  (STANDING):  aMIOdarone    Tablet 200 milliGRAM(s) Oral daily  clindamycin IVPB 600 milliGRAM(s) IV Intermittent every 8 hours  clopidogrel Tablet 75 milliGRAM(s) Oral daily  collagenase Ointment 1 Application(s) Topical once  dapagliflozin 5 milliGRAM(s) Oral daily  diltiazem    milliGRAM(s) Oral daily  furosemide    Tablet 40 milliGRAM(s) Oral daily  gabapentin 800 milliGRAM(s) Oral three times a day  influenza  Vaccine (HIGH DOSE) 0.5 milliLiter(s) IntraMuscular once  metoprolol succinate ER 25 milliGRAM(s) Oral daily  spironolactone 25 milliGRAM(s) Oral daily    MEDICATIONS  (PRN):  acetaminophen     Tablet .. 650 milliGRAM(s) Oral every 6 hours PRN Mild Pain (1 - 3)  oxyCODONE    IR 5 milliGRAM(s) Oral every 8 hours PRN Severe Pain (7 - 10)  zolpidem 10 milliGRAM(s) Oral at bedtime PRN Insomnia    Allergies    pregabalin (Unknown)  PC Pen VK (Rash)  latex (Unknown)  penicillin (Hives; Urticaria)    Intolerances      Social: no smoking, no alcohol, no illegal drugs; no recent travel, no exposure to TB  FAMILY HISTORY:  Family history of breast cancer in mother  Family history of diabetes mellitus in mother  FHx: heart disease (Sibling)    ROS: the patient denies fever, no chills, no HA, no seizures, no dizziness, no sore throat, no nasal congestion, no blurry vision, no CP, no palpitations, no SOB, no cough, no abdominal pain, no diarrhea, no N/V, no dysuria, no leg pain, has right groin draining wound, no rash, no joint aches, no rectal pain or bleeding, no night sweats  All other systems reviewed and are negative    Vital Signs Last 24 Hrs  T(C): 36.8 (04 Jan 2025 08:26), Max: 36.8 (03 Jan 2025 13:14)  T(F): 98.2 (04 Jan 2025 08:26), Max: 98.2 (03 Jan 2025 13:14)  HR: 59 (04 Jan 2025 08:26) (50 - 59)  BP: 119/51 (04 Jan 2025 08:26) (108/40 - 140/65)  BP(mean): 64 (03 Jan 2025 13:14) (64 - 64)  RR: 18 (04 Jan 2025 08:26) (17 - 18)  SpO2: 96% (04 Jan 2025 08:26) (94% - 100%)    Parameters below as of 03 Jan 2025 22:32  Patient On (Oxygen Delivery Method): room air      Daily Height in cm: 177.8 (03 Jan 2025 12:46)    Daily     PE:    Constitutional:  No acute distress  HEENT: NC/AT, EOMI, PERRLA, conjunctivae clear; ears and nose atraumatic; pharynx benign  Neck: supple; thyroid not palpable  Back: no tenderness  Respiratory: respiratory effort normal; clear to auscultation  Cardiovascular: S1S2 regular, no murmurs  Abdomen: soft, not tender, not distended, positive BS; no liver or spleen organomegaly  Genitourinary: no suprapubic tenderness  Lymphatic: no LN palpable  Musculoskeletal: no muscle tenderness, no joint swelling or tenderness  Extremities: no pedal edema  Right inguinal wound with surrounding erythema; no drainage noted at this time  Neurological/ Psychiatric: AxOx3, judgement and insight normal; moving all extremities  Skin: no rashes; no palpable lesions    Labs: all available labs reviewed                        7.1    7.39  )-----------( 253      ( 04 Jan 2025 07:16 )             24.4     01-04    137  |  107  |  19  ----------------------------<  107[H]  4.0   |  26  |  0.75    Ca    8.9      04 Jan 2025 07:16  Phos  4.1     01-04  Mg     1.7     01-04    TPro  7.5  /  Alb  2.9[L]  /  TBili  0.2  /  DBili  x   /  AST  8[L]  /  ALT  12  /  AlkPhos  99  01-03     LIVER FUNCTIONS - ( 03 Jan 2025 13:48 )  Alb: 2.9 g/dL / Pro: 7.5 gm/dL / ALK PHOS: 99 U/L / ALT: 12 U/L / AST: 8 U/L / GGT: x           Radiology: all available radiological tests reviewed    < from: CT Abdomen and Pelvis w/ IV Cont (12.31.24 @ 04:55) >  10 cm in greatest dimension fluid collection superficial to the right common femoral vessels. Given the history this most likely represents an abscess. Subacute hematoma, seroma might also appear this way.  Biliary ductal dilation which has increased slightly. While most likely compensatory and nonobstructive in this patient status post cholecystectomy, obstruction remains possible though less likely.  < end of copied text >    Advanced directives addressed: full resuscitation

## 2025-01-05 LAB
ANION GAP SERPL CALC-SCNC: 6 MMOL/L — SIGNIFICANT CHANGE UP (ref 5–17)
BUN SERPL-MCNC: 19 MG/DL — SIGNIFICANT CHANGE UP (ref 7–23)
CALCIUM SERPL-MCNC: 8.8 MG/DL — SIGNIFICANT CHANGE UP (ref 8.5–10.1)
CHLORIDE SERPL-SCNC: 104 MMOL/L — SIGNIFICANT CHANGE UP (ref 96–108)
CO2 SERPL-SCNC: 28 MMOL/L — SIGNIFICANT CHANGE UP (ref 22–31)
CREAT SERPL-MCNC: 0.83 MG/DL — SIGNIFICANT CHANGE UP (ref 0.5–1.3)
CULTURE RESULTS: SIGNIFICANT CHANGE UP
EGFR: 73 ML/MIN/1.73M2 — SIGNIFICANT CHANGE UP
FERRITIN SERPL-MCNC: 27 NG/ML — SIGNIFICANT CHANGE UP (ref 13–330)
GLUCOSE SERPL-MCNC: 90 MG/DL — SIGNIFICANT CHANGE UP (ref 70–99)
HCT VFR BLD CALC: 24.4 % — LOW (ref 34.5–45)
HGB BLD-MCNC: 7.2 G/DL — LOW (ref 11.5–15.5)
IRON SATN MFR SERPL: 13 UG/DL — LOW (ref 30–160)
IRON SATN MFR SERPL: 5 % — LOW (ref 14–50)
MAGNESIUM SERPL-MCNC: 1.6 MG/DL — SIGNIFICANT CHANGE UP (ref 1.6–2.6)
MCHC RBC-ENTMCNC: 24.7 PG — LOW (ref 27–34)
MCHC RBC-ENTMCNC: 29.5 G/DL — LOW (ref 32–36)
MCV RBC AUTO: 83.8 FL — SIGNIFICANT CHANGE UP (ref 80–100)
PHOSPHATE SERPL-MCNC: 4.7 MG/DL — HIGH (ref 2.5–4.5)
PLATELET # BLD AUTO: 240 K/UL — SIGNIFICANT CHANGE UP (ref 150–400)
POTASSIUM SERPL-MCNC: 3.8 MMOL/L — SIGNIFICANT CHANGE UP (ref 3.5–5.3)
POTASSIUM SERPL-SCNC: 3.8 MMOL/L — SIGNIFICANT CHANGE UP (ref 3.5–5.3)
RBC # BLD: 2.91 M/UL — LOW (ref 3.8–5.2)
RBC # FLD: 16.7 % — HIGH (ref 10.3–14.5)
SODIUM SERPL-SCNC: 138 MMOL/L — SIGNIFICANT CHANGE UP (ref 135–145)
SPECIMEN SOURCE: SIGNIFICANT CHANGE UP
TIBC SERPL-MCNC: 284 UG/DL — SIGNIFICANT CHANGE UP (ref 220–430)
UIBC SERPL-MCNC: 271 UG/DL — SIGNIFICANT CHANGE UP (ref 110–370)
WBC # BLD: 6.51 K/UL — SIGNIFICANT CHANGE UP (ref 3.8–10.5)
WBC # FLD AUTO: 6.51 K/UL — SIGNIFICANT CHANGE UP (ref 3.8–10.5)

## 2025-01-05 PROCEDURE — 99233 SBSQ HOSP IP/OBS HIGH 50: CPT

## 2025-01-05 RX ORDER — OXYCODONE HCL 15 MG
2.5 TABLET ORAL ONCE
Refills: 0 | Status: COMPLETED | OUTPATIENT
Start: 2025-01-05 | End: 2025-01-05

## 2025-01-05 RX ORDER — SODIUM CHLORIDE 9 MG/ML
250 INJECTION, SOLUTION INTRAMUSCULAR; INTRAVENOUS; SUBCUTANEOUS ONCE
Refills: 0 | Status: COMPLETED | OUTPATIENT
Start: 2025-01-05 | End: 2025-01-05

## 2025-01-05 RX ORDER — DIPHENHYDRAMINE HCL 25 MG
25 TABLET ORAL ONCE
Refills: 0 | Status: COMPLETED | OUTPATIENT
Start: 2025-01-05 | End: 2025-01-05

## 2025-01-05 RX ADMIN — EZETIMIBE 10 MILLIGRAM(S): 10 TABLET ORAL at 09:15

## 2025-01-05 RX ADMIN — Medication 5 MILLIGRAM(S): at 14:09

## 2025-01-05 RX ADMIN — DILTIAZEM HYDROCHLORIDE 120 MILLIGRAM(S): 300 CAPSULE, COATED, EXTENDED RELEASE ORAL at 09:16

## 2025-01-05 RX ADMIN — ACETAMINOPHEN 650 MILLIGRAM(S): 80 SOLUTION/ DROPS ORAL at 22:28

## 2025-01-05 RX ADMIN — SODIUM CHLORIDE 250 MILLILITER(S): 9 INJECTION, SOLUTION INTRAMUSCULAR; INTRAVENOUS; SUBCUTANEOUS at 00:54

## 2025-01-05 RX ADMIN — Medication 2000 MILLIGRAM(S): at 21:04

## 2025-01-05 RX ADMIN — SODIUM CHLORIDE 250 MILLILITER(S): 9 INJECTION, SOLUTION INTRAMUSCULAR; INTRAVENOUS; SUBCUTANEOUS at 21:58

## 2025-01-05 RX ADMIN — Medication 400 MILLIGRAM(S): at 21:58

## 2025-01-05 RX ADMIN — METRONIDAZOLE 100 MILLIGRAM(S): 250 TABLET ORAL at 18:45

## 2025-01-05 RX ADMIN — VANCOMYCIN HYDROCHLORIDE 250 MILLIGRAM(S): 5 INJECTION, POWDER, LYOPHILIZED, FOR SOLUTION INTRAVENOUS at 18:45

## 2025-01-05 RX ADMIN — ACETAMINOPHEN 650 MILLIGRAM(S): 80 SOLUTION/ DROPS ORAL at 05:41

## 2025-01-05 RX ADMIN — ACETAMINOPHEN 650 MILLIGRAM(S): 80 SOLUTION/ DROPS ORAL at 21:58

## 2025-01-05 RX ADMIN — GABAPENTIN 800 MILLIGRAM(S): 300 CAPSULE ORAL at 14:02

## 2025-01-05 RX ADMIN — Medication 5 MILLIGRAM(S): at 05:41

## 2025-01-05 RX ADMIN — GABAPENTIN 800 MILLIGRAM(S): 300 CAPSULE ORAL at 21:04

## 2025-01-05 RX ADMIN — Medication 25 MILLIGRAM(S): at 09:14

## 2025-01-05 RX ADMIN — METRONIDAZOLE 100 MILLIGRAM(S): 250 TABLET ORAL at 05:41

## 2025-01-05 RX ADMIN — CLOPIDOGREL BISULFATE 75 MILLIGRAM(S): 75 TABLET, FILM COATED ORAL at 09:14

## 2025-01-05 RX ADMIN — SPIRONOLACTONE 25 MILLIGRAM(S): 50 TABLET ORAL at 09:14

## 2025-01-05 RX ADMIN — AMIODARONE HYDROCHLORIDE 200 MILLIGRAM(S): 200 TABLET ORAL at 09:14

## 2025-01-05 RX ADMIN — Medication 2000 MILLIGRAM(S): at 14:00

## 2025-01-05 RX ADMIN — Medication 25 MILLIGRAM(S): at 21:58

## 2025-01-05 NOTE — PROGRESS NOTE ADULT - ASSESSMENT
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  Medicine consulted for comanagement.    #R groin surgical site infection  patient under vasc service seen by infectious disease today  had purulence as well  on vanco cefepime flagyl  -care per primary, ID     #chronic diastolic CHF  patients tte last month:  LV cavity moderately dilated, normal wall thickness, normal LVEF, severe (grade 3) LV diastolic dysfunction, severe LA dilatation, mod to moderate RA dilatation, normal RV size and function.   no acute signs of HF at this time  on po lasix - continue at this dosage  given 250 cc of fluids last night for hypotension -- remains normotensive now after  -monitor fluid status  -c/w metoprolol ER, furosemide PO, spironolactone with hold parameteres   -resume Farxiga on discharge   -Monitor weights    #Hx of Afib s/p ablation  -c/w Cardizem CD and amiodarone.   patients xarelto being held - planned for procedure next week   -ac per primary team ahead of surgical scheduling      #HLD  -restart home Zetia    #anemia  patient with hg of 7.1 today -- baseline 7s to 8s. normocytic. no signs or sx of bleeding at this time  -wound transfuse for hg <7 or any active bleeding  -keep active t&s  -f/u iron studies    Medicine will continue to follow.

## 2025-01-05 NOTE — PROGRESS NOTE ADULT - SUBJECTIVE AND OBJECTIVE BOX
Date of service: 01-05-25 @ 09:17    Lying in bed in NAD  Has right inguinal tenderness and erythema  No discharge   Episode of hypotension last night    ROS: no fever or chills; denies dizziness, no HA, no SOB or cough, no abdominal pain, no diarrhea or constipation; no dysuria, no legs pain, no rashes    MEDICATIONS  (STANDING):  aMIOdarone    Tablet 200 milliGRAM(s) Oral daily  cefepime  Injectable. 2000 milliGRAM(s) IV Push every 12 hours  clopidogrel Tablet 75 milliGRAM(s) Oral daily  collagenase Ointment 1 Application(s) Topical once  diltiazem    milliGRAM(s) Oral daily  ezetimibe 10 milliGRAM(s) Oral daily  furosemide    Tablet 40 milliGRAM(s) Oral daily  gabapentin 800 milliGRAM(s) Oral three times a day  influenza  Vaccine (HIGH DOSE) 0.5 milliLiter(s) IntraMuscular once  metoprolol succinate ER 25 milliGRAM(s) Oral daily  metroNIDAZOLE  IVPB 500 milliGRAM(s) IV Intermittent every 8 hours  spironolactone 25 milliGRAM(s) Oral daily  vancomycin  IVPB 750 milliGRAM(s) IV Intermittent every 12 hours    Vital Signs Last 24 Hrs  T(C): 36.7 (05 Jan 2025 00:20), Max: 36.8 (04 Jan 2025 16:10)  T(F): 98.1 (05 Jan 2025 00:20), Max: 98.2 (04 Jan 2025 16:10)  HR: 52 (05 Jan 2025 05:40) (50 - 55)  BP: 113/32 (05 Jan 2025 05:40) (103/33 - 120/64)  BP(mean): 58 (05 Jan 2025 02:48) (58 - 58)  RR: 18 (05 Jan 2025 00:20) (18 - 18)  SpO2: 96% (05 Jan 2025 00:20) (96% - 100%)    Parameters below as of 05 Jan 2025 00:20  Patient On (Oxygen Delivery Method): room air     Physical exam:    Constitutional:  No acute distress  HEENT: NC/AT, EOMI, PERRLA, conjunctivae clear; ears and nose atraumatic; pharynx benign  Neck: supple; thyroid not palpable  Back: no tenderness  Respiratory: respiratory effort normal; clear to auscultation  Cardiovascular: S1S2 regular, no murmurs  Abdomen: soft, not tender, not distended, positive BS; no liver or spleen organomegaly  Genitourinary: no suprapubic tenderness  Lymphatic: no LN palpable  Musculoskeletal: no muscle tenderness, no joint swelling or tenderness  Extremities: no pedal edema  Right inguinal wound with surrounding erythema - slightly improving  no drainage noted at this time  Neurological/ Psychiatric: AxOx3, judgement and insight normal; moving all extremities  Skin: no rashes; no palpable lesions    Labs: all available labs reviewed                        7.1    7.39  )-----------( 253      ( 04 Jan 2025 07:16 )             24.4     01-04    137  |  107  |  19  ----------------------------<  107[H]  4.0   |  26  |  0.75    Ca    8.9      04 Jan 2025 07:16  Phos  4.1     01-04  Mg     1.7     01-04    TPro  7.5  /  Alb  2.9[L]  /  TBili  0.2  /  DBili  x   /  AST  8[L]  /  ALT  12  /  AlkPhos  99  01-03     LIVER FUNCTIONS - ( 03 Jan 2025 13:48 )  Alb: 2.9 g/dL / Pro: 7.5 gm/dL / ALK PHOS: 99 U/L / ALT: 12 U/L / AST: 8 U/L / GGT: x           Radiology: all available radiological tests reviewed    < from: CT Abdomen and Pelvis w/ IV Cont (12.31.24 @ 04:55) >  10 cm in greatest dimension fluid collection superficial to the right common femoral vessels. Given the history this most likely represents an abscess. Subacute hematoma, seroma might also appear this way.  Biliary ductal dilation which has increased slightly. While most likely compensatory and nonobstructive in this patient status post cholecystectomy, obstruction remains possible though less likely.  < end of copied text >    Advanced directives addressed: full resuscitation

## 2025-01-05 NOTE — PROGRESS NOTE ADULT - ASSESSMENT
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       PLAN  local wound care with betadine painting bid  ID consult :efepime, vanc, flagyl;  Medical consult for co-management   AC on hold  Podiatry consult : Dressing changes of b/l LE q48hrs  Resume home meds   Will plan for wound washout and closure with flap next week on 1/8.

## 2025-01-05 NOTE — PROGRESS NOTE ADULT - SUBJECTIVE AND OBJECTIVE BOX
SURGERY DAILY PROGRESS NOTE:     Subjective:  Patient seen and examined at bedside. AVSS. Denies any fevers, chills, n/v/d, chest pain or shortness of breath    Objective:    MEDICATIONS  (STANDING):  aMIOdarone    Tablet 200 milliGRAM(s) Oral daily  clindamycin IVPB 600 milliGRAM(s) IV Intermittent every 8 hours  clopidogrel Tablet 75 milliGRAM(s) Oral daily  collagenase Ointment 1 Application(s) Topical once  dapagliflozin 5 milliGRAM(s) Oral daily  diltiazem    milliGRAM(s) Oral daily  furosemide    Tablet 40 milliGRAM(s) Oral daily  gabapentin 800 milliGRAM(s) Oral three times a day  influenza  Vaccine (HIGH DOSE) 0.5 milliLiter(s) IntraMuscular once  metoprolol succinate ER 25 milliGRAM(s) Oral daily  spironolactone 25 milliGRAM(s) Oral daily    MEDICATIONS  (PRN):  acetaminophen     Tablet .. 650 milliGRAM(s) Oral every 6 hours PRN Mild Pain (1 - 3)  oxyCODONE    IR 5 milliGRAM(s) Oral every 8 hours PRN Severe Pain (7 - 10)  zolpidem 10 milliGRAM(s) Oral at bedtime PRN Insomnia      Vital Signs Last 24 Hrs  T(C): 36.7 (03 Jan 2025 22:32), Max: 36.8 (03 Jan 2025 13:14)  T(F): 98 (03 Jan 2025 22:32), Max: 98.2 (03 Jan 2025 13:14)  HR: 55 (03 Jan 2025 22:32) (50 - 55)  BP: 108/40 (03 Jan 2025 22:32) (108/40 - 140/65)  BP(mean): 64 (03 Jan 2025 13:14) (64 - 64)  RR: 18 (03 Jan 2025 22:32) (17 - 18)  SpO2: 94% (03 Jan 2025 22:32) (94% - 100%)    Parameters below as of 03 Jan 2025 22:32  Patient On (Oxygen Delivery Method): room air          PHYSICAL EXAM   Gen: well-appearing, in no acute distress  CV: pulse regularly present   Resp: airway patent, non-labored breathing  Abd: soft, NTND; no rebound or guarding.  Ext.+2 left fem, 1+ right fem pulses, doppler signals of b/l DP biphasic and b/l PT monophasic. Rt foot dressing c/d/i  Skin: R groin wound at the prior incision site with slough, no obvious drainage. B/l LE stasis dermatitis.       I&O's Detail      Daily Height in cm: 177.8 (03 Jan 2025 12:46)    Daily     LABS:                        7.7    7.63  )-----------( 279      ( 03 Jan 2025 13:48 )             26.6     01-03    136  |  106  |  18  ----------------------------<  82  4.4   |  28  |  0.78    Ca    8.6      03 Jan 2025 13:48    TPro  7.5  /  Alb  2.9[L]  /  TBili  0.2  /  DBili  x   /  AST  8[L]  /  ALT  12  /  AlkPhos  99  01-03    PT/INR - ( 03 Jan 2025 13:48 )   PT: 39.0 sec;   INR: 3.34 ratio         PTT - ( 03 Jan 2025 13:48 )  PTT:48.9 sec  Urinalysis Basic - ( 03 Jan 2025 13:48 )    Color: x / Appearance: x / SG: x / pH: x  Gluc: 82 mg/dL / Ketone: x  / Bili: x / Urobili: x   Blood: x / Protein: x / Nitrite: x   Leuk Esterase: x / RBC: x / WBC x   Sq Epi: x / Non Sq Epi: x / Bacteria: x

## 2025-01-05 NOTE — PROGRESS NOTE ADULT - SUBJECTIVE AND OBJECTIVE BOX
CC: Patient is a 74y old  Female who presents with a chief complaint of groin collection (05 Jan 2025 09:16)      INTERVAL EVENTS: LINDA    SUBJECTIVE / INTERVAL HPI: Patient seen and examined at bedside. Patient emotional this AM due to having no window in her room    ROS: negative unless otherwise stated above.    VITAL SIGNS:  Vital Signs Last 24 Hrs  T(C): 36.4 (05 Jan 2025 08:23), Max: 36.8 (04 Jan 2025 16:10)  T(F): 97.5 (05 Jan 2025 08:23), Max: 98.2 (04 Jan 2025 16:10)  HR: 51 (05 Jan 2025 08:23) (50 - 55)  BP: 125/45 (05 Jan 2025 08:23) (103/33 - 125/45)  BP(mean): 58 (05 Jan 2025 02:48) (58 - 58)  RR: 18 (05 Jan 2025 08:23) (18 - 18)  SpO2: 98% (05 Jan 2025 08:23) (96% - 100%)    Parameters below as of 05 Jan 2025 08:23  Patient On (Oxygen Delivery Method): room air          01-04-25 @ 07:01  -  01-05-25 @ 07:00  --------------------------------------------------------  IN: 0 mL / OUT: 500 mL / NET: -500 mL        PHYSICAL EXAM:    General: NAD  HEENT: MMM  Neck: supple  Cardiovascular: +S1/S2; RRR  Respiratory: CTA B/L; no W/R/R  Gastrointestinal: soft, NT/ND  Extremities: WWP; b/l le skin discoloration  Vascular: 2+ radial, DP/PT pulses B/L  Neurological: AAOx3; no focal deficits    MEDICATIONS:  MEDICATIONS  (STANDING):  aMIOdarone    Tablet 200 milliGRAM(s) Oral daily  cefepime  Injectable. 2000 milliGRAM(s) IV Push every 12 hours  clopidogrel Tablet 75 milliGRAM(s) Oral daily  collagenase Ointment 1 Application(s) Topical once  diltiazem    milliGRAM(s) Oral daily  ezetimibe 10 milliGRAM(s) Oral daily  furosemide    Tablet 40 milliGRAM(s) Oral daily  gabapentin 800 milliGRAM(s) Oral three times a day  influenza  Vaccine (HIGH DOSE) 0.5 milliLiter(s) IntraMuscular once  metoprolol succinate ER 25 milliGRAM(s) Oral daily  metroNIDAZOLE  IVPB 500 milliGRAM(s) IV Intermittent every 8 hours  spironolactone 25 milliGRAM(s) Oral daily  vancomycin  IVPB 750 milliGRAM(s) IV Intermittent every 12 hours    MEDICATIONS  (PRN):  acetaminophen     Tablet .. 650 milliGRAM(s) Oral every 6 hours PRN Mild Pain (1 - 3)  ondansetron   Disintegrating Tablet 4 milliGRAM(s) Oral every 6 hours PRN Nausea and/or Vomiting  oxyCODONE    IR 5 milliGRAM(s) Oral every 8 hours PRN Severe Pain (7 - 10)  zolpidem 10 milliGRAM(s) Oral at bedtime PRN Insomnia      ALLERGIES:  Allergies    pregabalin (Unknown)  PC Pen VK (Rash)  latex (Unknown)  penicillin (Hives; Urticaria)    Intolerances        LABS:                        7.2    6.51  )-----------( 240      ( 05 Jan 2025 07:39 )             24.4     01-05    138  |  104  |  19  ----------------------------<  90  3.8   |  28  |  0.83    Ca    8.8      05 Jan 2025 07:39  Phos  4.7     01-05  Mg     1.6     01-05    TPro  7.5  /  Alb  2.9[L]  /  TBili  0.2  /  DBili  x   /  AST  8[L]  /  ALT  12  /  AlkPhos  99  01-03    PT/INR - ( 04 Jan 2025 07:16 )   PT: 16.9 sec;   INR: 1.48 ratio         PTT - ( 03 Jan 2025 13:48 )  PTT:48.9 sec  Urinalysis Basic - ( 05 Jan 2025 07:39 )    Color: x / Appearance: x / SG: x / pH: x  Gluc: 90 mg/dL / Ketone: x  / Bili: x / Urobili: x   Blood: x / Protein: x / Nitrite: x   Leuk Esterase: x / RBC: x / WBC x   Sq Epi: x / Non Sq Epi: x / Bacteria: x      CAPILLARY BLOOD GLUCOSE          RADIOLOGY & ADDITIONAL TESTS: Reviewed.

## 2025-01-05 NOTE — PROGRESS NOTE ADULT - ASSESSMENT
74-year-old Female with h/o DM type 2, TAVR 8/2024 on Plavix , A-fib on Xarelto, CHF, HLD, PVD, b/l LE venous insufficiency, bilateral CFA stenoses s/p Rt SFA endarterectomy on 12/17/24 was admitted on 1/3 with purulent drainage at the R groin surgical site. She reported subjective fevers. She was seen recently at [facility] and was treated with oral abx and local wound care,  but symptoms have not improved. In ER she received clindamycin IV.    #Right inguinal cellulitis/ postsurgical infection s/p right SFA endarterectomy ?graft in place?  #Right inguinal collection ?abscess ?hematoma?  #PVD  #Febrile syndrome improving  #Allergy to PCN  -right inguinal erythema, no discharge   -obtain wound c/s if drainage  -on vancomycin 750 mg IV q12h, cefepime 2 gm IV q12h and metronidazole 500 mg IV q8h # 2  -tolerating abx well so far; no side effects noted  -obtain vancomycin trough level   -monitor closely in ej of PCN allergy history  -surgical evaluation --> plan for wound washout and closure with flap next week on 1/8  -local wound care  -old chart reviewed to assess prior cultures  -monitor temps  -f/u CBC  -supportive care  2. Other issues:   -care per medicine    Clinical team may change from intravenous to oral antibiotics when the following criteria are met:   1. Patient is clinically improving/stable       a)	Improved signs and symptoms of infection from initial presentation       b)	Afebrile for 24 hours       c)	Leukocytosis trending towards normal range   2. Patient is tolerating oral intake   3. Initial/repeat blood cultures are negative OR do not need to wait for preliminary blood cultures to result    When above criteria met OR on date, may change iv antibiotics to an oral regimen vs continuing IV abx therapy  Cannot advise changing to oral antibiotic therapy until culture sensitivity is available.

## 2025-01-05 NOTE — CHART NOTE - NSCHARTNOTEFT_GEN_A_CORE
Writer was contacted by nursing staff and notified patient was hypotensive. BP rechecked by MD, confirmed to be low, however patient asymptomatic. Mildly hypovolemic on physical exam, and will administer  mL IV bolus. Also discontinued Farxiga, as patient is acutely ill with an infection and per day hospitalist note should be held and resumed on discharge.

## 2025-01-06 LAB
ANION GAP SERPL CALC-SCNC: 2 MMOL/L — LOW (ref 5–17)
BUN SERPL-MCNC: 16 MG/DL — SIGNIFICANT CHANGE UP (ref 7–23)
CALCIUM SERPL-MCNC: 8.9 MG/DL — SIGNIFICANT CHANGE UP (ref 8.5–10.1)
CHLORIDE SERPL-SCNC: 105 MMOL/L — SIGNIFICANT CHANGE UP (ref 96–108)
CO2 SERPL-SCNC: 31 MMOL/L — SIGNIFICANT CHANGE UP (ref 22–31)
CREAT SERPL-MCNC: 0.8 MG/DL — SIGNIFICANT CHANGE UP (ref 0.5–1.3)
EGFR: 77 ML/MIN/1.73M2 — SIGNIFICANT CHANGE UP
GLUCOSE SERPL-MCNC: 103 MG/DL — HIGH (ref 70–99)
HCT VFR BLD CALC: 26 % — LOW (ref 34.5–45)
HGB BLD-MCNC: 7.4 G/DL — LOW (ref 11.5–15.5)
MAGNESIUM SERPL-MCNC: 1.8 MG/DL — SIGNIFICANT CHANGE UP (ref 1.6–2.6)
MCHC RBC-ENTMCNC: 24.2 PG — LOW (ref 27–34)
MCHC RBC-ENTMCNC: 28.5 G/DL — LOW (ref 32–36)
MCV RBC AUTO: 85 FL — SIGNIFICANT CHANGE UP (ref 80–100)
PHOSPHATE SERPL-MCNC: 4.1 MG/DL — SIGNIFICANT CHANGE UP (ref 2.5–4.5)
PLATELET # BLD AUTO: 241 K/UL — SIGNIFICANT CHANGE UP (ref 150–400)
POTASSIUM SERPL-MCNC: 4.1 MMOL/L — SIGNIFICANT CHANGE UP (ref 3.5–5.3)
POTASSIUM SERPL-SCNC: 4.1 MMOL/L — SIGNIFICANT CHANGE UP (ref 3.5–5.3)
RBC # BLD: 3.06 M/UL — LOW (ref 3.8–5.2)
RBC # FLD: 16.6 % — HIGH (ref 10.3–14.5)
SODIUM SERPL-SCNC: 138 MMOL/L — SIGNIFICANT CHANGE UP (ref 135–145)
VANCOMYCIN TROUGH SERPL-MCNC: 10.6 UG/ML — SIGNIFICANT CHANGE UP (ref 10–20)
WBC # BLD: 6.26 K/UL — SIGNIFICANT CHANGE UP (ref 3.8–10.5)
WBC # FLD AUTO: 6.26 K/UL — SIGNIFICANT CHANGE UP (ref 3.8–10.5)

## 2025-01-06 PROCEDURE — 99233 SBSQ HOSP IP/OBS HIGH 50: CPT

## 2025-01-06 PROCEDURE — 99232 SBSQ HOSP IP/OBS MODERATE 35: CPT

## 2025-01-06 RX ORDER — IRON SUCROSE 20 MG/ML
100 INJECTION, SOLUTION INTRAVENOUS EVERY 24 HOURS
Refills: 0 | Status: COMPLETED | OUTPATIENT
Start: 2025-01-06 | End: 2025-01-08

## 2025-01-06 RX ORDER — MAGNESIUM SULFATE 500 MG/ML
1 INJECTION, SOLUTION INTRAMUSCULAR; INTRAVENOUS ONCE
Refills: 0 | Status: COMPLETED | OUTPATIENT
Start: 2025-01-06 | End: 2025-01-06

## 2025-01-06 RX ORDER — CYANOCOBALAMIN 1000 UG/ML
1000 INJECTION, SOLUTION INTRAMUSCULAR; SUBCUTANEOUS DAILY
Refills: 0 | Status: COMPLETED | OUTPATIENT
Start: 2025-01-06 | End: 2025-01-10

## 2025-01-06 RX ORDER — VITAMIN A 10000 UNIT
1 TABLET ORAL DAILY
Refills: 0 | Status: COMPLETED | OUTPATIENT
Start: 2025-01-06 | End: 2025-01-11

## 2025-01-06 RX ADMIN — GABAPENTIN 800 MILLIGRAM(S): 300 CAPSULE ORAL at 16:02

## 2025-01-06 RX ADMIN — CLOPIDOGREL BISULFATE 75 MILLIGRAM(S): 75 TABLET, FILM COATED ORAL at 10:10

## 2025-01-06 RX ADMIN — Medication 5 MILLIGRAM(S): at 23:46

## 2025-01-06 RX ADMIN — Medication 10 MILLIGRAM(S): at 23:16

## 2025-01-06 RX ADMIN — Medication 5 MILLIGRAM(S): at 02:10

## 2025-01-06 RX ADMIN — GABAPENTIN 800 MILLIGRAM(S): 300 CAPSULE ORAL at 22:14

## 2025-01-06 RX ADMIN — EZETIMIBE 10 MILLIGRAM(S): 10 TABLET ORAL at 10:10

## 2025-01-06 RX ADMIN — Medication 5 MILLIGRAM(S): at 22:46

## 2025-01-06 RX ADMIN — Medication 2000 MILLIGRAM(S): at 22:14

## 2025-01-06 RX ADMIN — VANCOMYCIN HYDROCHLORIDE 250 MILLIGRAM(S): 5 INJECTION, POWDER, LYOPHILIZED, FOR SOLUTION INTRAVENOUS at 05:01

## 2025-01-06 RX ADMIN — METRONIDAZOLE 100 MILLIGRAM(S): 250 TABLET ORAL at 16:07

## 2025-01-06 RX ADMIN — VANCOMYCIN HYDROCHLORIDE 250 MILLIGRAM(S): 5 INJECTION, POWDER, LYOPHILIZED, FOR SOLUTION INTRAVENOUS at 20:21

## 2025-01-06 RX ADMIN — Medication 40 MILLIGRAM(S): at 05:10

## 2025-01-06 RX ADMIN — Medication 2000 MILLIGRAM(S): at 10:10

## 2025-01-06 RX ADMIN — METRONIDAZOLE 100 MILLIGRAM(S): 250 TABLET ORAL at 06:29

## 2025-01-06 RX ADMIN — AMIODARONE HYDROCHLORIDE 200 MILLIGRAM(S): 200 TABLET ORAL at 10:10

## 2025-01-06 RX ADMIN — METRONIDAZOLE 100 MILLIGRAM(S): 250 TABLET ORAL at 22:42

## 2025-01-06 RX ADMIN — GABAPENTIN 800 MILLIGRAM(S): 300 CAPSULE ORAL at 05:11

## 2025-01-06 RX ADMIN — DILTIAZEM HYDROCHLORIDE 120 MILLIGRAM(S): 300 CAPSULE, COATED, EXTENDED RELEASE ORAL at 10:10

## 2025-01-06 RX ADMIN — Medication 5 MILLIGRAM(S): at 01:36

## 2025-01-06 RX ADMIN — ACETAMINOPHEN 650 MILLIGRAM(S): 80 SOLUTION/ DROPS ORAL at 10:11

## 2025-01-06 NOTE — PROGRESS NOTE ADULT - SUBJECTIVE AND OBJECTIVE BOX
1/6/2025: Patient was seen by the podiatry team. Pt was resting comfortably and eating bedside. No acute distress. No new complaints       PMH: Diabetes mellitus type II, controlled    Atrial fibrillation    Congestive heart failure    Dyspnea    Morbid obesity with BMI of 40.0-44.9, adult    Neuropathy    Peripheral venous insufficiency    Thyroid nodule    HTN (hypertension)    Cellulitis    At risk for sleep apnea      PSH:History of esophagogastroduodenoscopy (EGD)    H/O colonoscopy    Other complications of gastric band procedure    S/P left knee arthroscopy    Status post medial meniscus repair    History of cholecystectomy    S/P cataract surgery        Allergies:pregabalin (Unknown)  PC Pen VK (Rash)  latex (Unknown)  penicillin (Hives; Urticaria)      Labs:                        7.7    7.63  )-----------( 279      ( 03 Jan 2025 13:48 )             26.6   01-03    136  |  106  |  18  ----------------------------<  82  4.4   |  28  |  0.78    Ca    8.6      03 Jan 2025 13:48    TPro  7.5  /  Alb  2.9[L]  /  TBili  0.2  /  DBili  x   /  AST  8[L]  /  ALT  12  /  AlkPhos  99  01-03      Vital Signs Last 24 Hrs  T(C): 36.4 (03 Jan 2025 15:56), Max: 36.8 (03 Jan 2025 13:14)  T(F): 97.6 (03 Jan 2025 15:56), Max: 98.2 (03 Jan 2025 13:14)  HR: 51 (03 Jan 2025 15:56) (50 - 51)  BP: 140/65 (03 Jan 2025 15:56) (127/40 - 140/65)  BP(mean): 64 (03 Jan 2025 13:14) (64 - 64)  RR: 17 (03 Jan 2025 15:56) (17 - 18)  SpO2: 99% (03 Jan 2025 15:56) (99% - 100%)    Parameters below as of 03 Jan 2025 15:56  Patient On (Oxygen Delivery Method): room air    REVIEW OF SYSTEMS:    CONSTITUTIONAL: No weakness, fevers or chills  EYES: No visual changes  RESPIRATORY: No cough, wheezing; No shortness of breath  CARDIOVASCULAR: No chest pain or palpitations  GASTROINTESTINAL: No abdominal or epigastric pain. No nausea, vomiting; No diarrhea or constipation.   GENITOURINARY: No dysuria, frequency or hematuria  NEUROLOGICAL: No numbness or weakness  SKIN: See physical examination.  All other review of systems is negative unless indicated above    Physical Exam:   Constitutional: NAD, alert;  Lower Extremity Focus  Derm:  Skin warm, dry and supple bilateral.    Ulceration Right heel wound, partial thickness in nature, wound base granular wound size (approx 1.5 cm X 0.5cm X 0.1cm) and surrounding predominant hyperkeratotic tissue, no stephen-wound erythema/edema, no pus/purulence, no crepitus/fluctuance, no tracking/tunneling, no probe to bone.  Partial thickness ulceration noted to posterior medial Left lower midleg, some serous drainage (improving), superficial wound bed, no malodor,  no crepitus/fluctuance, no tracking/tunneling, no PTB.   Vascular: Dorsalis Pedis and Posterior Tibial pulses weakly palpable  Neuro: Protective sensation intact to the level of the digits bilateral.  MSK: Muscle strength 5/5 all major muscle groups bilateral. Pt is minimally ambulatory

## 2025-01-06 NOTE — PHYSICAL THERAPY INITIAL EVALUATION ADULT - PERSONAL SAFETY AND JUDGMENT, REHAB EVAL
"yes discharge patient, she can get echo outpatient. Cardio cleared her yesterday" MD Beckman
intact
(2) good, crying

## 2025-01-06 NOTE — PROGRESS NOTE ADULT - ASSESSMENT
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       PLAN  local wound care with betadine painting bid  ID consult :cefepime, vanc, flagyl;  Medical consult for co-management   AC on hold  Podiatry consult : Dressing changes of foot wounds q48hrs  Resumed home meds   Will plan for wound washout and closure with flap next week on 1/8.       plan discussed with vascular surgery attending

## 2025-01-06 NOTE — PROGRESS NOTE ADULT - SUBJECTIVE AND OBJECTIVE BOX
CC: groin collection (06 Jan 2025 08:56)    HPI:  74-year-old F, with PMHx of DM, TAVR 8/2024 on Plavix , A-fib on Xarelto, CHF, HLD, PVD, b/l LE venous insufficiency, bilateral CFA stenoses s/p Rt SFA endarterectomy on 12/17/24 presenting with purulent drainage at the R groin surgical site. She report of subjective fevers. She was seen recently at [facility] and was provided with oral abx and local wound care but symptoms have not improved.  (03 Jan 2025 14:26)    INTERVAL HPI/OVERNIGHT EVENTS:    Vital Signs Last 24 Hrs  T(C): 36.4 (06 Jan 2025 08:06), Max: 36.6 (05 Jan 2025 23:32)  T(F): 97.5 (06 Jan 2025 08:06), Max: 97.9 (05 Jan 2025 23:32)  HR: 55 (06 Jan 2025 08:06) (52 - 60)  BP: 97/58 (06 Jan 2025 09:00) (97/58 - 137/62  RR: 17 (06 Jan 2025 08:06) (17 - 18)  SpO2: 100% (06 Jan 2025 08:06) (97% - 100%)    Parameters below as of 06 Jan 2025 08:06  Patient On (Oxygen Delivery Method): nasal cannula          REVIEW OF SYSTEMS:    CONSTITUTIONAL: No weakness, fevers or chills  EYES/ENT: No visual changes;  No vertigo or throat pain   NECK: No pain or stiffness  RESPIRATORY: No cough, wheezing, hemoptysis; No shortness of breath  CARDIOVASCULAR: No chest pain or palpitations  GASTROINTESTINAL: No abdominal or epigastric pain. No nausea, vomiting, or hematemesis; No diarrhea or constipation. No melena or hematochezia.  GENITOURINARY: No dysuria, frequency or hematuria  NEUROLOGICAL: No numbness or weakness  SKIN: No itching, burning, rashes, or lesions   All other review of systems is negative unless indicated above.  PHYSICAL EXAM:    General: in no acute distress  Eyes: PERRLA, EOMI; conjunctiva and sclera clear  Head: Normocephalic; atraumatic  ENMT: No nasal discharge; airway clear  Neck: Supple; non tender; no masses  Respiratory: No wheezes, rales or rhonchi  Cardiovascular: Regular rate and rhythm. S1 and S2 Normal; No murmurs, gallops or rubs  Gastrointestinal: Soft non-tender non-distended; Normal bowel sounds  Genitourinary: No  suprapubic  tenderness  Extremities: Normal range of motion, No clubbing, cyanosis or edema  Vascular: Peripheral pulses palpable 2+ bilaterally  Neurological: Alert and oriented x4  Skin: Warm and dry. No acute rash  Lymph Nodes: No acute cervical adenopathy  Musculoskeletal: Normal muscle tone, without deformities  Psychiatric: Cooperative and appropriate                            7.4    6.26  )-----------( 241      ( 06 Jan 2025 07:03 )             26.0     06 Jan 2025 07:03    138    |  105    |  16     ----------------------------<  103    4.1     |  31     |  0.80     Ca    8.9        06 Jan 2025 07:03  Phos  4.1       06 Jan 2025 07:03  Mg     1.8       06 Jan 2025 07:03        CAPILLARY BLOOD GLUCOSE          Urinalysis Basic - ( 06 Jan 2025 07:03 )    Color: x / Appearance: x / SG: x / pH: x  Gluc: 103 mg/dL / Ketone: x  / Bili: x / Urobili: x   Blood: x / Protein: x / Nitrite: x   Leuk Esterase: x / RBC: x / WBC x   Sq Epi: x / Non Sq Epi: x / Bacteria: x    Culture - Blood (01.03.25 @ 13:48)   Specimen Source: .Blood BLOOD  Culture Results:   No growth at 48 Hours      MEDICATIONS  (STANDING):  aMIOdarone    Tablet 200 milliGRAM(s) Oral daily  cefepime  Injectable. 2000 milliGRAM(s) IV Push every 12 hours  clopidogrel Tablet 75 milliGRAM(s) Oral daily  collagenase Ointment 1 Application(s) Topical once  diltiazem    milliGRAM(s) Oral daily  ezetimibe 10 milliGRAM(s) Oral daily  furosemide    Tablet 40 milliGRAM(s) Oral daily  gabapentin 800 milliGRAM(s) Oral three times a day  influenza  Vaccine (HIGH DOSE) 0.5 milliLiter(s) IntraMuscular once  metoprolol succinate ER 25 milliGRAM(s) Oral daily  metroNIDAZOLE  IVPB 500 milliGRAM(s) IV Intermittent every 8 hours  spironolactone 25 milliGRAM(s) Oral daily  vancomycin  IVPB 750 milliGRAM(s) IV Intermittent every 12 hours    MEDICATIONS  (PRN):  acetaminophen     Tablet .. 650 milliGRAM(s) Oral every 6 hours PRN Mild Pain (1 - 3)  ondansetron   Disintegrating Tablet 4 milliGRAM(s) Oral every 6 hours PRN Nausea and/or Vomiting  oxyCODONE    IR 5 milliGRAM(s) Oral every 8 hours PRN Severe Pain (7 - 10)  zolpidem 10 milliGRAM(s) Oral at bedtime PRN Insomnia      RADIOLOGY & ADDITIONAL TESTS: CC: groin collection (06 Jan 2025 08:56)    HPI:  74-year-old F, with PMHx of DM, TAVR 8/2024 on Plavix , A-fib on Xarelto, CHF, HLD, PVD, b/l LE venous insufficiency, bilateral CFA stenoses s/p Rt SFA endarterectomy on 12/17/24 presenting with purulent drainage at the R groin surgical site. She report of subjective fevers. She was seen recently at [facility] and was provided with oral abx and local wound care but symptoms have not improved.      INTERVAL HPI/ OVERNIGHT EVENTS:  chart reviewed, Pt was seen and examined, reports no dizziness no SOB, Denies CP,   feels better, awaiting for Sx on 1/8.     Vital Signs Last 24 Hrs  T(C): 36.4 (06 Jan 2025 08:06), Max: 36.6 (05 Jan 2025 23:32)  T(F): 97.5 (06 Jan 2025 08:06), Max: 97.9 (05 Jan 2025 23:32)  HR: 55 (06 Jan 2025 08:06) (52 - 60)  BP: 97/58 (06 Jan 2025 09:00) (97/58 - 137/62  RR: 17 (06 Jan 2025 08:06) (17 - 18)  SpO2: 100% (06 Jan 2025 08:06) (97% - 100%)    Parameters below as of 06 Jan 2025 08:06  Patient On (Oxygen Delivery Method): nasal cannula          REVIEW OF SYSTEMS:  All other review of systems is negative unless indicated above.      PHYSICAL EXAM:  General: in no acute distress  Eyes: EOMI; conjunctiva and sclera clear  Head: Normocephalic; atraumatic  ENMT: No nasal discharge; airway clear  Respiratory: No wheezes, rales or rhonchi  Cardiovascular: Regular rate and rhythm. S1 and S2 Normal; + murmur   Gastrointestinal: Soft non-tender non-distended; Normal bowel sounds  Genitourinary: No  suprapubic  tenderness  Extremities: + LE   edema  Neurological: Alert and oriented x3, non focal   Musculoskeletal: Normal muscle tone, without deformities  Psychiatric: Cooperative     LABS:                         7.4    6.26  )-----------( 241      ( 06 Jan 2025 07:03 )             26.0     06 Jan 2025 07:03    138    |  105    |  16     ----------------------------<  103    4.1     |  31     |  0.80     Ca    8.9        06 Jan 2025 07:03  Phos  4.1       06 Jan 2025 07:03  Mg     1.8       06 Jan 2025 07:03        Urinalysis Basic - ( 06 Jan 2025 07:03 )  Color: x / Appearance: x / SG: x / pH: x  Gluc: 103 mg/dL / Ketone: x  / Bili: x / Urobili: x   Blood: x / Protein: x / Nitrite: x   Leuk Esterase: x / RBC: x / WBC x   Sq Epi: x / Non Sq Epi: x / Bacteria: x    Culture - Blood (01.03.25 @ 13:48)   Specimen Source: .Blood BLOOD  Culture Results:   No growth at 48 Hours      MEDICATIONS  (STANDING):  aMIOdarone    Tablet 200 milliGRAM(s) Oral daily  cefepime  Injectable. 2000 milliGRAM(s) IV Push every 12 hours  clopidogrel Tablet 75 milliGRAM(s) Oral daily  collagenase Ointment 1 Application(s) Topical once  diltiazem    milliGRAM(s) Oral daily  ezetimibe 10 milliGRAM(s) Oral daily  furosemide    Tablet 40 milliGRAM(s) Oral daily  gabapentin 800 milliGRAM(s) Oral three times a day  influenza  Vaccine (HIGH DOSE) 0.5 milliLiter(s) IntraMuscular once  metoprolol succinate ER 25 milliGRAM(s) Oral daily  metroNIDAZOLE  IVPB 500 milliGRAM(s) IV Intermittent every 8 hours  spironolactone 25 milliGRAM(s) Oral daily  vancomycin  IVPB 750 milliGRAM(s) IV Intermittent every 12 hours    MEDICATIONS  (PRN):  acetaminophen     Tablet .. 650 milliGRAM(s) Oral every 6 hours PRN Mild Pain (1 - 3)  ondansetron   Disintegrating Tablet 4 milliGRAM(s) Oral every 6 hours PRN Nausea and/or Vomiting  oxyCODONE    IR 5 milliGRAM(s) Oral every 8 hours PRN Severe Pain (7 - 10)  zolpidem 10 milliGRAM(s) Oral at bedtime PRN Insomnia      RADIOLOGY & ADDITIONAL TESTS:

## 2025-01-06 NOTE — PROGRESS NOTE ADULT - ASSESSMENT
74-year-old Female with h/o DM type 2, TAVR 8/2024 on Plavix , A-fib on Xarelto, CHF, HLD, PVD, b/l LE venous insufficiency, bilateral CFA stenoses s/p Rt SFA endarterectomy on 12/17/24 was admitted on 1/3 with purulent drainage at the R groin surgical site. She reported subjective fevers. She was seen recently at [facility] and was treated with oral abx and local wound care,  but symptoms have not improved. In ER she received clindamycin IV.    #Right inguinal cellulitis/ postsurgical infection s/p right SFA endarterectomy ?graft in place?  #Right inguinal collection ?abscess ?hematoma?  #PVD  #Allergy to PCN  -right inguinal erythema, no discharge   -obtain wound c/s if drainage  -on vancomycin 750 mg IV q12h, cefepime 2 gm IV q12h and metronidazole 500 mg IV q8h # 3  -tolerating abx well so far; no side effects noted  -obtain vancomycin trough level   -monitor closely in ej of PCN allergy history  -surgical evaluation --> plan for wound washout and closure with flap next week on 1/8  -local wound care  -continue abx coverage   -monitor temps  -f/u CBC  -supportive care  2. Other issues:   -care per medicine    Clinical team may change from intravenous to oral antibiotics when the following criteria are met:   1. Patient is clinically improving/stable       a)	Improved signs and symptoms of infection from initial presentation       b)	Afebrile for 24 hours       c)	Leukocytosis trending towards normal range   2. Patient is tolerating oral intake   3. Initial/repeat blood cultures are negative OR do not need to wait for preliminary blood cultures to result    When above criteria met OR on date, may change iv antibiotics to an oral regimen vs continuing IV abx therapy  Cannot advise changing to oral antibiotic therapy until culture sensitivity is available.

## 2025-01-06 NOTE — PROGRESS NOTE ADULT - SUBJECTIVE AND OBJECTIVE BOX
SURGERY DAILY PROGRESS NOTE:     Subjective:  Patient seen and examined at bedside. Dressing change being done two times a day by surgical team to right groin. AVSS. Denies any fevers, chills, n/v/d, chest pain or shortness of breath    Objective:    MEDICATIONS  (STANDING):  aMIOdarone    Tablet 200 milliGRAM(s) Oral daily  clindamycin IVPB 600 milliGRAM(s) IV Intermittent every 8 hours  clopidogrel Tablet 75 milliGRAM(s) Oral daily  collagenase Ointment 1 Application(s) Topical once  dapagliflozin 5 milliGRAM(s) Oral daily  diltiazem    milliGRAM(s) Oral daily  furosemide    Tablet 40 milliGRAM(s) Oral daily  gabapentin 800 milliGRAM(s) Oral three times a day  influenza  Vaccine (HIGH DOSE) 0.5 milliLiter(s) IntraMuscular once  metoprolol succinate ER 25 milliGRAM(s) Oral daily  spironolactone 25 milliGRAM(s) Oral daily    MEDICATIONS  (PRN):  acetaminophen     Tablet .. 650 milliGRAM(s) Oral every 6 hours PRN Mild Pain (1 - 3)  oxyCODONE    IR 5 milliGRAM(s) Oral every 8 hours PRN Severe Pain (7 - 10)  zolpidem 10 milliGRAM(s) Oral at bedtime PRN Insomnia      Vital Signs Last 24 Hrs  T(C): 36.7 (03 Jan 2025 22:32), Max: 36.8 (03 Jan 2025 13:14)  T(F): 98 (03 Jan 2025 22:32), Max: 98.2 (03 Jan 2025 13:14)  HR: 55 (03 Jan 2025 22:32) (50 - 55)  BP: 108/40 (03 Jan 2025 22:32) (108/40 - 140/65)  BP(mean): 64 (03 Jan 2025 13:14) (64 - 64)  RR: 18 (03 Jan 2025 22:32) (17 - 18)  SpO2: 94% (03 Jan 2025 22:32) (94% - 100%)    Parameters below as of 03 Jan 2025 22:32  Patient On (Oxygen Delivery Method): room air          PHYSICAL EXAM   Gen: well-appearing, in no acute distress  CV: pulse regularly present   Resp: airway patent, non-labored breathing  Abd: soft, NTND; no rebound or guarding.  Ext.+2 left fem, 1+ right fem pulses, doppler signals of b/l DP biphasic and b/l PT monophasic. Rt foot dressing c/d/i  Skin: R groin wound at the prior incision site with slough, no obvious drainage. B/l LE stasis dermatitis.       I&O's Detail      Daily Height in cm: 177.8 (03 Jan 2025 12:46)    Daily     LABS:                        7.7    7.63  )-----------( 279      ( 03 Jan 2025 13:48 )             26.6     01-03    136  |  106  |  18  ----------------------------<  82  4.4   |  28  |  0.78    Ca    8.6      03 Jan 2025 13:48    TPro  7.5  /  Alb  2.9[L]  /  TBili  0.2  /  DBili  x   /  AST  8[L]  /  ALT  12  /  AlkPhos  99  01-03    PT/INR - ( 03 Jan 2025 13:48 )   PT: 39.0 sec;   INR: 3.34 ratio         PTT - ( 03 Jan 2025 13:48 )  PTT:48.9 sec  Urinalysis Basic - ( 03 Jan 2025 13:48 )    Color: x / Appearance: x / SG: x / pH: x  Gluc: 82 mg/dL / Ketone: x  / Bili: x / Urobili: x   Blood: x / Protein: x / Nitrite: x   Leuk Esterase: x / RBC: x / WBC x   Sq Epi: x / Non Sq Epi: x / Bacteria: x

## 2025-01-06 NOTE — PROVIDER CONTACT NOTE (OTHER) - SITUATION
Pt came in with surigical wound complications. Diastolic BP is low 117/29 and pt c/o itchiness on ble

## 2025-01-06 NOTE — PROGRESS NOTE ADULT - SUBJECTIVE AND OBJECTIVE BOX
Date of service: 01-06-25 @ 08:57    Lying in bed in NAD  Weak looking  Has right inguinal tenderness  No fever    ROS: no fever or chills; denies dizziness, no HA, no SOB or cough, no abdominal pain, no diarrhea or constipation; no dysuria, no legs pain, no rashes    MEDICATIONS  (STANDING):  aMIOdarone    Tablet 200 milliGRAM(s) Oral daily  cefepime  Injectable. 2000 milliGRAM(s) IV Push every 12 hours  clopidogrel Tablet 75 milliGRAM(s) Oral daily  collagenase Ointment 1 Application(s) Topical once  diltiazem    milliGRAM(s) Oral daily  ezetimibe 10 milliGRAM(s) Oral daily  furosemide    Tablet 40 milliGRAM(s) Oral daily  gabapentin 800 milliGRAM(s) Oral three times a day  influenza  Vaccine (HIGH DOSE) 0.5 milliLiter(s) IntraMuscular once  metoprolol succinate ER 25 milliGRAM(s) Oral daily  metroNIDAZOLE  IVPB 500 milliGRAM(s) IV Intermittent every 8 hours  spironolactone 25 milliGRAM(s) Oral daily  vancomycin  IVPB 750 milliGRAM(s) IV Intermittent every 12 hours    Vital Signs Last 24 Hrs  T(C): 36.4 (06 Jan 2025 08:06), Max: 36.6 (05 Jan 2025 23:32)  T(F): 97.5 (06 Jan 2025 08:06), Max: 97.9 (05 Jan 2025 23:32)  HR: 55 (06 Jan 2025 08:06) (52 - 60)  BP: 123/47 (06 Jan 2025 08:06) (113/29 - 137/62)  BP(mean): --  RR: 17 (06 Jan 2025 08:06) (17 - 18)  SpO2: 100% (06 Jan 2025 08:06) (97% - 100%)    Parameters below as of 06 Jan 2025 08:06  Patient On (Oxygen Delivery Method): nasal cannula     Physical exam:    Constitutional:  No acute distress  HEENT: NC/AT, EOMI, PERRLA, conjunctivae clear; ears and nose atraumatic; pharynx benign  Neck: supple; thyroid not palpable  Back: no tenderness  Respiratory: respiratory effort normal; clear to auscultation  Cardiovascular: S1S2 regular, no murmurs  Abdomen: soft, not tender, not distended, positive BS; no liver or spleen organomegaly  Genitourinary: no suprapubic tenderness  Lymphatic: no LN palpable  Musculoskeletal: no muscle tenderness, no joint swelling or tenderness  Extremities: no pedal edema  Right inguinal wound with surrounding erythema - slightly improving  no drainage noted at this time  Neurological/ Psychiatric: AxOx3, judgement and insight normal; moving all extremities  Skin: no rashes; no palpable lesions    Labs: reviewed                        7.4    6.26  )-----------( 241      ( 06 Jan 2025 07:03 )             26.0     01-06    138  |  105  |  16  ----------------------------<  103[H]  4.1   |  31  |  0.80    Ca    8.9      06 Jan 2025 07:03  Phos  4.1     01-06  Mg     1.8     01-06    Ferritin: 27 ng/mL (01-05-25 @ 07:39)                          7.1    7.39  )-----------( 253      ( 04 Jan 2025 07:16 )             24.4     01-04    137  |  107  |  19  ----------------------------<  107[H]  4.0   |  26  |  0.75    Ca    8.9      04 Jan 2025 07:16  Phos  4.1     01-04  Mg     1.7     01-04    TPro  7.5  /  Alb  2.9[L]  /  TBili  0.2  /  DBili  x   /  AST  8[L]  /  ALT  12  /  AlkPhos  99  01-03     LIVER FUNCTIONS - ( 03 Jan 2025 13:48 )  Alb: 2.9 g/dL / Pro: 7.5 gm/dL / ALK PHOS: 99 U/L / ALT: 12 U/L / AST: 8 U/L / GGT: x           Radiology: all available radiological tests reviewed    < from: CT Abdomen and Pelvis w/ IV Cont (12.31.24 @ 04:55) >  10 cm in greatest dimension fluid collection superficial to the right common femoral vessels. Given the history this most likely represents an abscess. Subacute hematoma, seroma might also appear this way.  Biliary ductal dilation which has increased slightly. While most likely compensatory and nonobstructive in this patient status post cholecystectomy, obstruction remains possible though less likely.  < end of copied text >    Advanced directives addressed: full resuscitation

## 2025-01-06 NOTE — PHYSICAL THERAPY INITIAL EVALUATION ADULT - PERTINENT HX OF CURRENT PROBLEM, REHAB EVAL
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 plan for wound washout and closure with flap next week on 1/8. 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 Right inguinal cellulitis/ postsurgical infection s/p right SFA endarterectomy ?graft in place? plan for wound washout and closure with flap next week on 1/8.

## 2025-01-06 NOTE — PHYSICAL THERAPY INITIAL EVALUATION ADULT - DIAGNOSIS, PT EVAL
R groin surgical site infection R groin surgical site infection, surgical evaluation --> plan for wound washout and closure with flap next week on 1/8

## 2025-01-06 NOTE — PHYSICAL THERAPY INITIAL EVALUATION ADULT - ASSISTIVE DEVICE:SUPINE/SIT, REHAB EVAL
well developed, well nourished , in no acute distress , ambulating without difficulty , normal communication ability
bed rails

## 2025-01-06 NOTE — PROGRESS NOTE ADULT - ASSESSMENT
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  Medicine consulted for comanagement.    #R groin surgical site infection  patient under vasc service seen by infectious disease today  had purulence as well  on vanco cefepime flagyl  -care per primary, ID     #chronic diastolic CHF  patients tte last month:  LV cavity moderately dilated, normal wall thickness, normal LVEF, severe (grade 3) LV diastolic dysfunction, severe LA dilatation, mod to moderate RA dilatation, normal RV size and function.   no acute signs of HF at this time  on po lasix - continue at this dosage  given 250 cc of fluids last night for hypotension -- remains normotensive now after  -monitor fluid status  -c/w metoprolol ER, furosemide PO, spironolactone with hold parameteres   -resume Farxiga on discharge   -Monitor weights    #Hx of Afib s/p ablation  -c/w Cardizem CD and amiodarone.   patients xarelto being held - planned for procedure next week   -ac per primary team ahead of surgical scheduling      #HLD  -restart home Zetia    #anemia  patient with hg of 7.1 today -- baseline 7s to 8s. normocytic. no signs or sx of bleeding at this time  -wound transfuse for hg <7 or any active bleeding  -keep active t&s  -f/u iron studies    Medicine will continue to follow.   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  Medicine consulted for comanagement.    #R groin surgical site infection  patient under vasc service followed by  ID   BCX: NGTD  C/w  vanco cefepime flagyl  Plan for wound debridement 1/8/25    #chronic diastolic CHF  TTE last month:  LV cavity moderately dilated, normal wall thickness, normal LVEF, severe (grade 3) LV diastolic dysfunction, severe LA dilatation, mod to moderate RA dilatation, normal RV size and function.   no acute signs of HF at this time  Hold  po lasix -and spironolactone as BP low   for optimization   Pt was given 250 cc of fluids prior night for hypotension  -monitor fluid status  -c/w metoprolol ER with hold parameters   - Farxiga on hold resume at  discharge   -Monitor weights    #Hx of Afib s/p ablation  -c/w Cardizem CD and amiodarone.   patients xarelto being held - planned for procedure       #HLD  -on home Zetia    # Anemia,  iron deficiency, B12, folate def   and chronic Dz   baseline 7s to 8s. normocytic.   no signs or sx of bleeding at this time  f/u iron studies mixed chronic  Dz and iron def   B12/folate reviewed past admission, both low   will start B12 SQ, Folate IV   Start Iron sucrose IV   trend CBC     Medicine will continue to follow.

## 2025-01-06 NOTE — PROGRESS NOTE ADULT - ASSESSMENT
A: 75 y/o Female seen for the following:   -Rt heel partial thickness pressure wound, stable without acute SOI  -Lt posterior lower leg partial thickness venous stasis wound, stable without acute SOI  -BLE venous stasis  -PVD    P:   Chart reviewed and Patient evaluated  Wound to the posterior Left lower mid leg, with some serous drainage, no malodor, superficial in nature, stable without SOI. Rt heel stage 2 pressure ulcer; stable without any SOI. Wounds improved compared to previous admission.  WC: Santyl with dry sterile dressing to Rt heel. Santyl and Compressive dressings applied to Left Lowe Extremity  Weight-Bearing-As-Tolerated to Bilateral Lower Extremity  Offload bilateral heels with two pillows under bilateral calfs or total CAIR boots to prevent further soft tissue damage as pt is has been predominately bedbound or sitting on a chair while placing pressure to heels.    No acute podiatric intervention warranted at this time. Continue with local wound care  Pt to follow up at [facility] with Dr. Stovall for continued wound care treatment.   All additional care per vascular and Med appreciated  Patient demonstrated verbal understanding of all interventions and tolerated interventions well without any complications  Podiatry will follow peripherally while in house      Case D/W with attending Dr. Stovall    Wound care instructions for the Rt heel and Lt lower leg to be changed every other day:  - Please apply santyl to the Right heel and Left lower leg  - Cover with gauze and fernando or kerlix, secure with tape.  Thank you

## 2025-01-07 LAB
HCT VFR BLD CALC: 26.5 % — LOW (ref 34.5–45)
HGB BLD-MCNC: 7.6 G/DL — LOW (ref 11.5–15.5)
MCHC RBC-ENTMCNC: 24 PG — LOW (ref 27–34)
MCHC RBC-ENTMCNC: 28.7 G/DL — LOW (ref 32–36)
MCV RBC AUTO: 83.6 FL — SIGNIFICANT CHANGE UP (ref 80–100)
PLATELET # BLD AUTO: 250 K/UL — SIGNIFICANT CHANGE UP (ref 150–400)
RBC # BLD: 3.17 M/UL — LOW (ref 3.8–5.2)
RBC # FLD: 16.6 % — HIGH (ref 10.3–14.5)
WBC # BLD: 7.43 K/UL — SIGNIFICANT CHANGE UP (ref 3.8–10.5)
WBC # FLD AUTO: 7.43 K/UL — SIGNIFICANT CHANGE UP (ref 3.8–10.5)

## 2025-01-07 PROCEDURE — 99233 SBSQ HOSP IP/OBS HIGH 50: CPT

## 2025-01-07 RX ORDER — SODIUM CHLORIDE 9 MG/ML
1000 INJECTION, SOLUTION INTRAMUSCULAR; INTRAVENOUS; SUBCUTANEOUS
Refills: 0 | Status: DISCONTINUED | OUTPATIENT
Start: 2025-01-08 | End: 2025-01-09

## 2025-01-07 RX ORDER — SODIUM CHLORIDE 9 MG/ML
1000 INJECTION, SOLUTION INTRAVENOUS
Refills: 0 | Status: DISCONTINUED | OUTPATIENT
Start: 2025-01-08 | End: 2025-01-09

## 2025-01-07 RX ORDER — OXYCODONE HCL 15 MG
10 TABLET ORAL EVERY 8 HOURS
Refills: 0 | Status: DISCONTINUED | OUTPATIENT
Start: 2025-01-07 | End: 2025-01-14

## 2025-01-07 RX ORDER — NYSTATIN TOPICAL POWDER 100000 U/G
1 POWDER TOPICAL EVERY 12 HOURS
Refills: 0 | Status: DISCONTINUED | OUTPATIENT
Start: 2025-01-07 | End: 2025-01-14

## 2025-01-07 RX ADMIN — VANCOMYCIN HYDROCHLORIDE 250 MILLIGRAM(S): 5 INJECTION, POWDER, LYOPHILIZED, FOR SOLUTION INTRAVENOUS at 09:30

## 2025-01-07 RX ADMIN — CYANOCOBALAMIN 1000 MICROGRAM(S): 1000 INJECTION, SOLUTION INTRAMUSCULAR; SUBCUTANEOUS at 00:03

## 2025-01-07 RX ADMIN — VANCOMYCIN HYDROCHLORIDE 250 MILLIGRAM(S): 5 INJECTION, POWDER, LYOPHILIZED, FOR SOLUTION INTRAVENOUS at 23:19

## 2025-01-07 RX ADMIN — Medication 25 MILLIGRAM(S): at 09:27

## 2025-01-07 RX ADMIN — CLOPIDOGREL BISULFATE 75 MILLIGRAM(S): 75 TABLET, FILM COATED ORAL at 09:26

## 2025-01-07 RX ADMIN — CYANOCOBALAMIN 1000 MICROGRAM(S): 1000 INJECTION, SOLUTION INTRAMUSCULAR; SUBCUTANEOUS at 09:28

## 2025-01-07 RX ADMIN — METRONIDAZOLE 100 MILLIGRAM(S): 250 TABLET ORAL at 05:21

## 2025-01-07 RX ADMIN — Medication 1 MILLIGRAM(S): at 00:02

## 2025-01-07 RX ADMIN — GABAPENTIN 800 MILLIGRAM(S): 300 CAPSULE ORAL at 05:21

## 2025-01-07 RX ADMIN — METRONIDAZOLE 100 MILLIGRAM(S): 250 TABLET ORAL at 13:11

## 2025-01-07 RX ADMIN — Medication 2000 MILLIGRAM(S): at 09:37

## 2025-01-07 RX ADMIN — DILTIAZEM HYDROCHLORIDE 120 MILLIGRAM(S): 300 CAPSULE, COATED, EXTENDED RELEASE ORAL at 09:27

## 2025-01-07 RX ADMIN — ACETAMINOPHEN 650 MILLIGRAM(S): 80 SOLUTION/ DROPS ORAL at 23:55

## 2025-01-07 RX ADMIN — GABAPENTIN 800 MILLIGRAM(S): 300 CAPSULE ORAL at 13:10

## 2025-01-07 RX ADMIN — Medication 10 MILLIGRAM(S): at 17:41

## 2025-01-07 RX ADMIN — IRON SUCROSE 210 MILLIGRAM(S): 20 INJECTION, SOLUTION INTRAVENOUS at 00:03

## 2025-01-07 RX ADMIN — Medication 1 MILLIGRAM(S): at 09:28

## 2025-01-07 RX ADMIN — EZETIMIBE 10 MILLIGRAM(S): 10 TABLET ORAL at 09:28

## 2025-01-07 RX ADMIN — MAGNESIUM SULFATE 100 GRAM(S): 500 INJECTION, SOLUTION INTRAMUSCULAR; INTRAVENOUS at 00:53

## 2025-01-07 RX ADMIN — AMIODARONE HYDROCHLORIDE 200 MILLIGRAM(S): 200 TABLET ORAL at 09:27

## 2025-01-07 RX ADMIN — Medication 2000 MILLIGRAM(S): at 21:51

## 2025-01-07 RX ADMIN — Medication 10 MILLIGRAM(S): at 23:25

## 2025-01-07 RX ADMIN — METRONIDAZOLE 100 MILLIGRAM(S): 250 TABLET ORAL at 21:51

## 2025-01-07 RX ADMIN — GABAPENTIN 800 MILLIGRAM(S): 300 CAPSULE ORAL at 21:51

## 2025-01-07 RX ADMIN — ACETAMINOPHEN 650 MILLIGRAM(S): 80 SOLUTION/ DROPS ORAL at 23:25

## 2025-01-07 NOTE — PROGRESS NOTE ADULT - SUBJECTIVE AND OBJECTIVE BOX
SURGERY DAILY PROGRESS NOTE:     Subjective:  Patient seen and examined at bedside during am rounds. AVSS. Denies any fevers, chills, n/v/d, chest pain or shortness of breath    Objective:    MEDICATIONS  (STANDING):  aMIOdarone    Tablet 200 milliGRAM(s) Oral daily  cefepime  Injectable. 2000 milliGRAM(s) IV Push every 12 hours  clopidogrel Tablet 75 milliGRAM(s) Oral daily  collagenase Ointment 1 Application(s) Topical once  cyanocobalamin Injectable 1000 MICROGram(s) SubCutaneous daily  diltiazem    milliGRAM(s) Oral daily  ezetimibe 10 milliGRAM(s) Oral daily  folic acid Injectable 1 milliGRAM(s) IV Push daily  gabapentin 800 milliGRAM(s) Oral three times a day  influenza  Vaccine (HIGH DOSE) 0.5 milliLiter(s) IntraMuscular once  iron sucrose IVPB 100 milliGRAM(s) IV Intermittent every 24 hours  metoprolol succinate ER 25 milliGRAM(s) Oral daily  metroNIDAZOLE  IVPB 500 milliGRAM(s) IV Intermittent every 8 hours  vancomycin  IVPB 750 milliGRAM(s) IV Intermittent every 12 hours    MEDICATIONS  (PRN):  acetaminophen     Tablet .. 650 milliGRAM(s) Oral every 6 hours PRN Mild Pain (1 - 3)  ondansetron   Disintegrating Tablet 4 milliGRAM(s) Oral every 6 hours PRN Nausea and/or Vomiting  oxyCODONE    IR 5 milliGRAM(s) Oral every 8 hours PRN Severe Pain (7 - 10)  zolpidem 10 milliGRAM(s) Oral at bedtime PRN Insomnia      Vital Signs Last 24 Hrs  T(C): 36.8 (07 Jan 2025 08:06), Max: 36.8 (06 Jan 2025 21:37)  T(F): 98.3 (07 Jan 2025 08:06), Max: 98.3 (07 Jan 2025 08:06)  HR: 76 (07 Jan 2025 08:06) (57 - 76)  BP: 119/50 (07 Jan 2025 08:06) (119/50 - 133/42)  BP(mean): --  RR: 18 (07 Jan 2025 08:06) (18 - 18)  SpO2: 99% (07 Jan 2025 08:06) (96% - 99%)    Parameters below as of 07 Jan 2025 08:06  Patient On (Oxygen Delivery Method): room air          PHYSICAL EXAM   Gen: well-appearing, in no acute distress  CV: pulse regularly present   Resp: airway patent, non-labored breathing  Abd: soft, NTND; no rebound or guarding.  Ext.+2 left fem, 1+ right fem pulses, doppler signals of b/l DP biphasic and b/l PT monophasic. Rt foot dressing c/d/i  Skin: R groin wound at the prior incision site with slough, no obvious drainage. B/l LE stasis dermatitis.       I&O's Detail    06 Jan 2025 07:01  -  07 Jan 2025 07:00  --------------------------------------------------------  IN:  Total IN: 0 mL    OUT:    Voided (mL): 550 mL  Total OUT: 550 mL    Total NET: -550 mL          Daily     Daily     LABS:                        7.6    7.43  )-----------( 250      ( 07 Jan 2025 07:19 )             26.5     01-06    138  |  105  |  16  ----------------------------<  103[H]  4.1   |  31  |  0.80    Ca    8.9      06 Jan 2025 07:03  Phos  4.1     01-06  Mg     1.8     01-06        Urinalysis Basic - ( 06 Jan 2025 07:03 )    Color: x / Appearance: x / SG: x / pH: x  Gluc: 103 mg/dL / Ketone: x  / Bili: x / Urobili: x   Blood: x / Protein: x / Nitrite: x   Leuk Esterase: x / RBC: x / WBC x   Sq Epi: x / Non Sq Epi: x / Bacteria: x

## 2025-01-07 NOTE — PROGRESS NOTE ADULT - SUBJECTIVE AND OBJECTIVE BOX
CC: groin collection (06 Jan 2025 08:56)    HPI:  74-year-old F, with PMHx of DM, TAVR 8/2024 on Plavix , A-fib on Xarelto, CHF, HLD, PVD, b/l LE venous insufficiency, bilateral CFA stenoses s/p Rt SFA endarterectomy on 12/17/24 presenting with purulent drainage at the R groin surgical site. She report of subjective fevers. She was seen recently at [facility] and was provided with oral abx and local wound care but symptoms have not improved.      INTERVAL HPI/ OVERNIGHT EVENTS: Pt was seen and examined,       Vital Signs Last 24 Hrs  T(C): 36.8 (07 Jan 2025 08:06), Max: 36.8 (06 Jan 2025 21:37)  T(F): 98.3 (07 Jan 2025 08:06), Max: 98.3 (07 Jan 2025 08:06)  HR: 76 (07 Jan 2025 08:06) (57 - 76)  BP: 119/50 (07 Jan 2025 08:06) (119/50 - 133/42)  RR: 18 (07 Jan 2025 08:06) (18 - 18)  SpO2: 99% (07 Jan 2025 08:06) (96% - 99%)    Parameters below as of 07 Jan 2025 08:06  Patient On (Oxygen Delivery Method): room air        REVIEW OF SYSTEMS:  All other review of systems is negative unless indicated above.      PHYSICAL EXAM:  General: in no acute distress  Eyes: EOMI; conjunctiva and sclera clear  Head: Normocephalic; atraumatic  ENMT: No nasal discharge; airway clear  Respiratory: No wheezes, rales or rhonchi  Cardiovascular: Regular rate and rhythm. S1 and S2 Normal; + murmur   Gastrointestinal: Soft non-tender non-distended; Normal bowel sounds  Genitourinary: No  suprapubic  tenderness  Extremities: + LE   edema  Neurological: Alert and oriented x3, non focal   Musculoskeletal: Normal muscle tone, without deformities  Psychiatric: Cooperative     LABS:                         7.6    7.43  )-----------( 250      ( 07 Jan 2025 07:19 )             26.5     01-06    138  |  105  |  16  ----------------------------<  103[H]  4.1   |  31  |  0.80    Ca    8.9      06 Jan 2025 07:03  Phos  4.1     01-06  Mg     1.8     01-06                              7.4    6.26  )-----------( 241      ( 06 Jan 2025 07:03 )             26.0     06 Jan 2025 07:03    138    |  105    |  16     ----------------------------<  103    4.1     |  31     |  0.80     Ca    8.9        06 Jan 2025 07:03  Phos  4.1       06 Jan 2025 07:03  Mg     1.8       06 Jan 2025 07:03        Urinalysis Basic - ( 06 Jan 2025 07:03 )  Color: x / Appearance: x / SG: x / pH: x  Gluc: 103 mg/dL / Ketone: x  / Bili: x / Urobili: x   Blood: x / Protein: x / Nitrite: x   MEDICATIONS  (STANDING):  aMIOdarone    Tablet 200 milliGRAM(s) Oral daily  cefepime  Injectable. 2000 milliGRAM(s) IV Push every 12 hours  clopidogrel Tablet 75 milliGRAM(s) Oral daily  collagenase Ointment 1 Application(s) Topical once  cyanocobalamin Injectable 1000 MICROGram(s) SubCutaneous daily  diltiazem    milliGRAM(s) Oral daily  ezetimibe 10 milliGRAM(s) Oral daily  folic acid Injectable 1 milliGRAM(s) IV Push daily  gabapentin 800 milliGRAM(s) Oral three times a day  influenza  Vaccine (HIGH DOSE) 0.5 milliLiter(s) IntraMuscular once  iron sucrose IVPB 100 milliGRAM(s) IV Intermittent every 24 hours  metoprolol succinate ER 25 milliGRAM(s) Oral daily  metroNIDAZOLE  IVPB 500 milliGRAM(s) IV Intermittent every 8 hours  vancomycin  IVPB 750 milliGRAM(s) IV Intermittent every 12 hours    MEDICATIONS  (PRN):  acetaminophen     Tablet .. 650 milliGRAM(s) Oral every 6 hours PRN Mild Pain (1 - 3)  ondansetron   Disintegrating Tablet 4 milliGRAM(s) Oral every 6 hours PRN Nausea and/or Vomiting  oxyCODONE    IR 5 milliGRAM(s) Oral every 8 hours PRN Severe Pain (7 - 10)  zolpidem 10 milliGRAM(s) Oral at bedtime PRN Insomnia  Leuk Esterase: x / RBC: x / WBC x   Sq Epi: x / Non Sq Epi: x / Bacteria: x    Culture - Blood (01.03.25 @ 13:48)   Specimen Source: .Blood BLOOD  Culture Results:   No growth at 48 Hours          RADIOLOGY & ADDITIONAL TESTS:      ACC: [id number] EXAM:  CT ABDOMEN AND PELVIS IC   ORDERED BY: LUÍS LANDRUM     PROCEDURE DATE:  12/31/2024          INTERPRETATION:  CLINICAL INFORMATION: 74-year-old F, with PMH DM taken   off metformin 2 months ago (after decreased HbA1C) , TAVR 8/2024 on   Plavix followed by a two month rehab stay, A-fib on Xarelto, CHF, HLD,   PVD, lower extremity elephantiasis with varicose veins who presents to   the ED with a right groin infection. Patient had visiting nurse come   today and nurse noticed right groin wound looked infected. Patient denies   fevers / chills or bleeding from wound. Endorses pus leaking from wound.   Patient is s/p Rt SFA endarterectomy on 12/17    COMPARISON: 12/10/2024 CT runoff.    CONTRAST/COMPLICATIONS:  IV Contrast: Omnipaque 350  95 cc administered   5 cc discarded  Oral Contrast: NONE  .    PROCEDURE:  CT of the Abdomen and Pelvis was performed.  Sagittal and coronal reformats were performed.    FINDINGS:  LOWER CHEST: Cardiomegaly, coronary artery atherosclerosis, TAVR,   calcified mitral annulus, partially visible    LIVER: Chronic 3.1 cm irregular in contour lesion lateral aspect right   lobe unchanged. No concerning hepatic lesions.  BILE DUCTS: Chronic intra and extrahepatic biliary ductal dilation.   Common bile duct 1.4 cm diameter. Increased from 1.0 cm on 11/18/2024.  GALLBLADDER: Cholecystectomy.  SPLEEN: Within normal limits.  PANCREAS: Within normal limits.  ADRENALS: Within normal limits.  KIDNEYS/URETERS: No stone or concerning finding. Small cysts.    BLADDER: Within normal limits.  REPRODUCTIVE ORGANS: Small right ovarian cysts again demonstrated.    BOWEL: No bowel obstruction. Appendix is normal. Left colonic   diverticulosis, no diverticulitis.  PERITONEUM/RETROPERITONEUM: Within normal limits.  VESSELS: Severe atherosclerosis again demonstrated. No abdominal aortic   aneurysm.  LYMPH NODES: No lymphadenopathy.  ABDOMINAL WALL: 8 x 3 x 10 cm TRV X AP X CC irregular in contour fluid   collection superficial to the right common femoral vessels. Mild   surrounding edema. Umbilical hernia contains fat but no bowel.  BONES: Degenerative changes particularly of the lumbar spine. No fracture   or acute osseous abnormality.    IMPRESSION:  10 cm in greatest dimension fluid collection superficial to the right   common femoral vessels. Given the history this most likely represents an   abscess. Subacute hematoma, seroma might also appear this way.    Biliary ductal dilation which has increased slightly. While most likely   compensatory and nonobstructive in this patient status post   cholecystectomy, obstruction remains possible though less likely.   CC: groin collection (06 Jan 2025 08:56)    HPI:  74-year-old F, with PMHx of DM, TAVR 8/2024 on Plavix , A-fib on Xarelto, CHF, HLD, PVD, b/l LE venous insufficiency, bilateral CFA stenoses s/p Rt SFA endarterectomy on 12/17/24 presenting with purulent drainage at the R groin surgical site. She report of subjective fevers. She was seen recently at [facility] and was provided with oral abx and local wound care but symptoms have not improved.      INTERVAL HPI/ OVERNIGHT EVENTS: Pt was seen and examined, reports has feet pain, not new, otherwise is doing fine. Pt is requesting diet to be changed to regular as wants to have braised ribs. Denies CP pr SOB       Vital Signs Last 24 Hrs  T(C): 36.8 (07 Jan 2025 08:06), Max: 36.8 (06 Jan 2025 21:37)  T(F): 98.3 (07 Jan 2025 08:06), Max: 98.3 (07 Jan 2025 08:06)  HR: 76 (07 Jan 2025 08:06) (57 - 76)  BP: 119/50 (07 Jan 2025 08:06) (119/50 - 133/42)  RR: 18 (07 Jan 2025 08:06) (18 - 18)  SpO2: 99% (07 Jan 2025 08:06) (96% - 99%)    Parameters below as of 07 Jan 2025 08:06  Patient On (Oxygen Delivery Method): room air        REVIEW OF SYSTEMS:  All other review of systems is negative unless indicated above.      PHYSICAL EXAM:  General: in no acute distress  Eyes: EOMI; conjunctiva and sclera clear  Head: Normocephalic; atraumatic  ENMT: No nasal discharge; airway clear  Respiratory: No wheezes, rales or rhonchi  Cardiovascular: Regular rate and rhythm. S1 and S2 Normal; + murmur   Gastrointestinal: Soft non-tender non-distended; Normal bowel sounds  Genitourinary: No  suprapubic  tenderness  Extremities: + LE   edema, R groin dressing intact   Neurological: Alert and oriented x3, non focal   Musculoskeletal: Normal muscle tone, without deformities  Psychiatric: Cooperative     LABS:                         7.6    7.43  )-----------( 250      ( 07 Jan 2025 07:19 )             26.5     01-06    138  |  105  |  16  ----------------------------<  103[H]  4.1   |  31  |  0.80    Ca    8.9      06 Jan 2025 07:03  Phos  4.1     01-06  Mg     1.8     01-06                              7.4    6.26  )-----------( 241      ( 06 Jan 2025 07:03 )             26.0     06 Jan 2025 07:03    138    |  105    |  16     ----------------------------<  103    4.1     |  31     |  0.80     Ca    8.9        06 Jan 2025 07:03  Phos  4.1       06 Jan 2025 07:03  Mg     1.8       06 Jan 2025 07:03      Culture - Blood (01.03.25 @ 13:48)   Specimen Source: .Blood BLOOD  Culture Results:   No growth at 72 Hours      Urinalysis Basic - ( 06 Jan 2025 07:03 )  Color: x / Appearance: x / SG: x / pH: x  Gluc: 103 mg/dL / Ketone: x  / Bili: x / Urobili: x   Blood: x / Protein: x / Nitrite: x   MEDICATIONS  (STANDING):  aMIOdarone    Tablet 200 milliGRAM(s) Oral daily  cefepime  Injectable. 2000 milliGRAM(s) IV Push every 12 hours  clopidogrel Tablet 75 milliGRAM(s) Oral daily  collagenase Ointment 1 Application(s) Topical once  cyanocobalamin Injectable 1000 MICROGram(s) SubCutaneous daily  diltiazem    milliGRAM(s) Oral daily  ezetimibe 10 milliGRAM(s) Oral daily  folic acid Injectable 1 milliGRAM(s) IV Push daily  gabapentin 800 milliGRAM(s) Oral three times a day  influenza  Vaccine (HIGH DOSE) 0.5 milliLiter(s) IntraMuscular once  iron sucrose IVPB 100 milliGRAM(s) IV Intermittent every 24 hours  metoprolol succinate ER 25 milliGRAM(s) Oral daily  metroNIDAZOLE  IVPB 500 milliGRAM(s) IV Intermittent every 8 hours  vancomycin  IVPB 750 milliGRAM(s) IV Intermittent every 12 hours    MEDICATIONS  (PRN):  acetaminophen     Tablet .. 650 milliGRAM(s) Oral every 6 hours PRN Mild Pain (1 - 3)  ondansetron   Disintegrating Tablet 4 milliGRAM(s) Oral every 6 hours PRN Nausea and/or Vomiting  oxyCODONE    IR 5 milliGRAM(s) Oral every 8 hours PRN Severe Pain (7 - 10)  zolpidem 10 milliGRAM(s) Oral at bedtime PRN Insomnia  Leuk Esterase: x / RBC: x / WBC x   Sq Epi: x / Non Sq Epi: x / Bacteria: x    Culture - Blood (01.03.25 @ 13:48)   Specimen Source: .Blood BLOOD  Culture Results:   No growth at 48 Hours          RADIOLOGY & ADDITIONAL TESTS:      ACC: [id number] EXAM:  CT ABDOMEN AND PELVIS IC   ORDERED BY: LUÍS LANDRUM     PROCEDURE DATE:  12/31/2024          INTERPRETATION:  CLINICAL INFORMATION: 74-year-old F, with PMH DM taken   off metformin 2 months ago (after decreased HbA1C) , TAVR 8/2024 on   Plavix followed by a two month rehab stay, A-fib on Xarelto, CHF, HLD,   PVD, lower extremity elephantiasis with varicose veins who presents to   the ED with a right groin infection. Patient had visiting nurse come   today and nurse noticed right groin wound looked infected. Patient denies   fevers / chills or bleeding from wound. Endorses pus leaking from wound.   Patient is s/p Rt SFA endarterectomy on 12/17    COMPARISON: 12/10/2024 CT runoff.    CONTRAST/COMPLICATIONS:  IV Contrast: Omnipaque 350  95 cc administered   5 cc discarded  Oral Contrast: NONE  .    PROCEDURE:  CT of the Abdomen and Pelvis was performed.  Sagittal and coronal reformats were performed.    FINDINGS:  LOWER CHEST: Cardiomegaly, coronary artery atherosclerosis, TAVR,   calcified mitral annulus, partially visible    LIVER: Chronic 3.1 cm irregular in contour lesion lateral aspect right   lobe unchanged. No concerning hepatic lesions.  BILE DUCTS: Chronic intra and extrahepatic biliary ductal dilation.   Common bile duct 1.4 cm diameter. Increased from 1.0 cm on 11/18/2024.  GALLBLADDER: Cholecystectomy.  SPLEEN: Within normal limits.  PANCREAS: Within normal limits.  ADRENALS: Within normal limits.  KIDNEYS/URETERS: No stone or concerning finding. Small cysts.    BLADDER: Within normal limits.  REPRODUCTIVE ORGANS: Small right ovarian cysts again demonstrated.    BOWEL: No bowel obstruction. Appendix is normal. Left colonic   diverticulosis, no diverticulitis.  PERITONEUM/RETROPERITONEUM: Within normal limits.  VESSELS: Severe atherosclerosis again demonstrated. No abdominal aortic   aneurysm.  LYMPH NODES: No lymphadenopathy.  ABDOMINAL WALL: 8 x 3 x 10 cm TRV X AP X CC irregular in contour fluid   collection superficial to the right common femoral vessels. Mild   surrounding edema. Umbilical hernia contains fat but no bowel.  BONES: Degenerative changes particularly of the lumbar spine. No fracture   or acute osseous abnormality.    IMPRESSION:  10 cm in greatest dimension fluid collection superficial to the right   common femoral vessels. Given the history this most likely represents an   abscess. Subacute hematoma, seroma might also appear this way.    Biliary ductal dilation which has increased slightly. While most likely   compensatory and nonobstructive in this patient status post   cholecystectomy, obstruction remains possible though less likely.

## 2025-01-07 NOTE — PROGRESS NOTE ADULT - ASSESSMENT
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  Medicine consulted for comanagement.    #R groin surgical site infection  patient under vasc service followed by  ID   BCX: NGTD  C/w  vanco cefepime flagyl  Plan for wound debridement 1/8/25    #chronic diastolic CHF  TTE last month:  LV cavity moderately dilated, normal wall thickness, normal LVEF, severe (grade 3) LV diastolic dysfunction, severe LA dilatation, mod to moderate RA dilatation, normal RV size and function.   no acute signs of HF at this time  Hold  po lasix -and spironolactone as BP low   for optimization   Pt was given 250 cc of fluids prior night for hypotension  -monitor fluid status  -c/w metoprolol ER with hold parameters   - Farxiga on hold resume at  discharge   -Monitor weights    #Hx of Afib s/p ablation  -c/w Cardizem CD and amiodarone.   patients xarelto being held - planned for procedure       #HLD  -on home Zetia    # Anemia,  iron deficiency, B12, folate def   and chronic Dz   baseline 7s to 8s. normocytic.   no signs or sx of bleeding at this time  f/u iron studies mixed chronic  Dz and iron def   B12/folate reviewed past admission, both low   will start B12 SQ, Folate IV   Start Iron sucrose IV   trend CBC     Medicine will continue to follow.   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  Medicine consulted for comanagement.    #R groin surgical site infection  patient under vasc service followed by  ID   BCX: NGTD  C/w  vanco cefepime flagyl  Plan for wound debridement 1/8/25 as per Sx       #chronic diastolic CHF  TTE last month:  LV cavity moderately dilated, normal wall thickness, normal LVEF, severe (grade 3) LV diastolic dysfunction, severe LA dilatation, mod to moderate RA dilatation, normal RV size and function.   no acute signs of HF at this time  Hold  po lasix -and spironolactone as BP low   for optimization   Pt was given 250 cc of fluids prior  for hypotension  -monitor fluid status  -c/w metoprolol ER with hold parameters   - Farxiga on hold resume at  discharge   -Monitor weights    #Hx of Afib s/p ablation  -c/w Cardizem CD and amiodarone.   patients xarelto being held - planned for procedure       #HLD  -on home Zetia    # Anemia,  iron deficiency, B12, folate def   and chronic Dz   baseline 7s to 8s. normocytic.   no signs or sx of bleeding at this time  f/u iron studies:  mixed chronic  Dz and iron def   B12/folate reviewed past admission, both low   started  B12 SQ, Folate IV, switch to PO after 5 doses   Started on Iron sucrose IV   trend CBC     Pt has no contraindications and is medically optimized for procedure     Medicine will continue to follow.

## 2025-01-08 LAB
ANION GAP SERPL CALC-SCNC: 3 MMOL/L — LOW (ref 5–17)
BUN SERPL-MCNC: 21 MG/DL — SIGNIFICANT CHANGE UP (ref 7–23)
CALCIUM SERPL-MCNC: 8.7 MG/DL — SIGNIFICANT CHANGE UP (ref 8.5–10.1)
CHLORIDE SERPL-SCNC: 102 MMOL/L — SIGNIFICANT CHANGE UP (ref 96–108)
CO2 SERPL-SCNC: 30 MMOL/L — SIGNIFICANT CHANGE UP (ref 22–31)
CREAT SERPL-MCNC: 0.89 MG/DL — SIGNIFICANT CHANGE UP (ref 0.5–1.3)
CULTURE RESULTS: SIGNIFICANT CHANGE UP
CULTURE RESULTS: SIGNIFICANT CHANGE UP
EGFR: 68 ML/MIN/1.73M2 — SIGNIFICANT CHANGE UP
GLUCOSE BLDC GLUCOMTR-MCNC: 106 MG/DL — HIGH (ref 70–99)
GLUCOSE SERPL-MCNC: 98 MG/DL — SIGNIFICANT CHANGE UP (ref 70–99)
HCT VFR BLD CALC: 26.3 % — LOW (ref 34.5–45)
HGB BLD-MCNC: 7.6 G/DL — LOW (ref 11.5–15.5)
INR BLD: 1.16 RATIO — SIGNIFICANT CHANGE UP (ref 0.85–1.16)
MAGNESIUM SERPL-MCNC: 1.8 MG/DL — SIGNIFICANT CHANGE UP (ref 1.6–2.6)
MCHC RBC-ENTMCNC: 24.2 PG — LOW (ref 27–34)
MCHC RBC-ENTMCNC: 28.9 G/DL — LOW (ref 32–36)
MCV RBC AUTO: 83.8 FL — SIGNIFICANT CHANGE UP (ref 80–100)
PHOSPHATE SERPL-MCNC: 3.6 MG/DL — SIGNIFICANT CHANGE UP (ref 2.5–4.5)
PLATELET # BLD AUTO: 284 K/UL — SIGNIFICANT CHANGE UP (ref 150–400)
POTASSIUM SERPL-MCNC: 4.4 MMOL/L — SIGNIFICANT CHANGE UP (ref 3.5–5.3)
POTASSIUM SERPL-SCNC: 4.4 MMOL/L — SIGNIFICANT CHANGE UP (ref 3.5–5.3)
PROTHROM AB SERPL-ACNC: 13.7 SEC — HIGH (ref 9.9–13.4)
RBC # BLD: 3.14 M/UL — LOW (ref 3.8–5.2)
RBC # FLD: 16.8 % — HIGH (ref 10.3–14.5)
SODIUM SERPL-SCNC: 135 MMOL/L — SIGNIFICANT CHANGE UP (ref 135–145)
SPECIMEN SOURCE: SIGNIFICANT CHANGE UP
SPECIMEN SOURCE: SIGNIFICANT CHANGE UP
VANCOMYCIN TROUGH SERPL-MCNC: 12.4 UG/ML — SIGNIFICANT CHANGE UP (ref 10–20)
WBC # BLD: 8.78 K/UL — SIGNIFICANT CHANGE UP (ref 3.8–10.5)
WBC # FLD AUTO: 8.78 K/UL — SIGNIFICANT CHANGE UP (ref 3.8–10.5)

## 2025-01-08 PROCEDURE — 99232 SBSQ HOSP IP/OBS MODERATE 35: CPT | Mod: GC

## 2025-01-08 PROCEDURE — 99232 SBSQ HOSP IP/OBS MODERATE 35: CPT

## 2025-01-08 PROCEDURE — 99233 SBSQ HOSP IP/OBS HIGH 50: CPT

## 2025-01-08 RX ORDER — FENTANYL 75 UG/H
50 PATCH, EXTENDED RELEASE TRANSDERMAL
Refills: 0 | Status: DISCONTINUED | OUTPATIENT
Start: 2025-01-08 | End: 2025-01-09

## 2025-01-08 RX ORDER — ONDANSETRON 4 MG/1
4 TABLET ORAL ONCE
Refills: 0 | Status: DISCONTINUED | OUTPATIENT
Start: 2025-01-08 | End: 2025-01-09

## 2025-01-08 RX ORDER — OXYCODONE HCL 15 MG
5 TABLET ORAL ONCE
Refills: 0 | Status: DISCONTINUED | OUTPATIENT
Start: 2025-01-08 | End: 2025-01-09

## 2025-01-08 RX ADMIN — IRON SUCROSE 210 MILLIGRAM(S): 20 INJECTION, SOLUTION INTRAVENOUS at 00:43

## 2025-01-08 RX ADMIN — Medication 2000 MILLIGRAM(S): at 12:00

## 2025-01-08 RX ADMIN — CYANOCOBALAMIN 1000 MICROGRAM(S): 1000 INJECTION, SOLUTION INTRAMUSCULAR; SUBCUTANEOUS at 12:00

## 2025-01-08 RX ADMIN — FENTANYL 50 MICROGRAM(S): 75 PATCH, EXTENDED RELEASE TRANSDERMAL at 19:35

## 2025-01-08 RX ADMIN — VANCOMYCIN HYDROCHLORIDE 250 MILLIGRAM(S): 5 INJECTION, POWDER, LYOPHILIZED, FOR SOLUTION INTRAVENOUS at 23:12

## 2025-01-08 RX ADMIN — NYSTATIN TOPICAL POWDER 1 APPLICATION(S): 100000 POWDER TOPICAL at 21:41

## 2025-01-08 RX ADMIN — VANCOMYCIN HYDROCHLORIDE 250 MILLIGRAM(S): 5 INJECTION, POWDER, LYOPHILIZED, FOR SOLUTION INTRAVENOUS at 11:59

## 2025-01-08 RX ADMIN — METRONIDAZOLE 100 MILLIGRAM(S): 250 TABLET ORAL at 21:37

## 2025-01-08 RX ADMIN — FENTANYL 50 MICROGRAM(S): 75 PATCH, EXTENDED RELEASE TRANSDERMAL at 19:20

## 2025-01-08 RX ADMIN — Medication 10 MILLIGRAM(S): at 01:17

## 2025-01-08 RX ADMIN — Medication 10 MILLIGRAM(S): at 01:47

## 2025-01-08 RX ADMIN — Medication 1 MILLIGRAM(S): at 12:01

## 2025-01-08 RX ADMIN — METRONIDAZOLE 100 MILLIGRAM(S): 250 TABLET ORAL at 15:29

## 2025-01-08 RX ADMIN — IRON SUCROSE 210 MILLIGRAM(S): 20 INJECTION, SOLUTION INTRAVENOUS at 20:46

## 2025-01-08 RX ADMIN — SODIUM CHLORIDE 50 MILLILITER(S): 9 INJECTION, SOLUTION INTRAVENOUS at 19:05

## 2025-01-08 RX ADMIN — SODIUM CHLORIDE 50 MILLILITER(S): 9 INJECTION, SOLUTION INTRAVENOUS at 06:00

## 2025-01-08 RX ADMIN — METRONIDAZOLE 100 MILLIGRAM(S): 250 TABLET ORAL at 06:00

## 2025-01-08 RX ADMIN — GABAPENTIN 800 MILLIGRAM(S): 300 CAPSULE ORAL at 06:00

## 2025-01-08 RX ADMIN — NYSTATIN TOPICAL POWDER 1 APPLICATION(S): 100000 POWDER TOPICAL at 06:00

## 2025-01-08 RX ADMIN — Medication 2000 MILLIGRAM(S): at 21:37

## 2025-01-08 RX ADMIN — GABAPENTIN 800 MILLIGRAM(S): 300 CAPSULE ORAL at 21:37

## 2025-01-08 NOTE — PROGRESS NOTE ADULT - ASSESSMENT
74-year-old Female with h/o DM type 2, TAVR 8/2024 on Plavix , A-fib on Xarelto, CHF, HLD, PVD, b/l LE venous insufficiency, bilateral CFA stenoses s/p Rt SFA endarterectomy on 12/17/24 was admitted on 1/3 with purulent drainage at the R groin surgical site. She reported subjective fevers. She was seen recently at [facility] and was treated with oral abx and local wound care,  but symptoms have not improved. In ER she received clindamycin IV.    #Right inguinal cellulitis/ postsurgical infection s/p right SFA endarterectomy ?graft in place?  #Right inguinal collection ?abscess ?hematoma?  #PVD  #Allergy to PCN  -right inguinal erythema, no discharge   -obtain wound c/s if drainage  -on vancomycin 750 mg IV q12h, cefepime 2 gm IV q12h and metronidazole 500 mg IV q8h # 5  -tolerating abx well so far; no side effects noted  -obtain vancomycin trough level   -monitor closely in ej of PCN allergy history  -surgical evaluation --> plan for wound washout today  -local wound care  -continue abx coverage   -monitor temps  -f/u CBC  -supportive care  2. Other issues:   -care per medicine    Clinical team may change from intravenous to oral antibiotics when the following criteria are met:   1. Patient is clinically improving/stable       a)	Improved signs and symptoms of infection from initial presentation       b)	Afebrile for 24 hours       c)	Leukocytosis trending towards normal range   2. Patient is tolerating oral intake   3. Initial/repeat blood cultures are negative OR do not need to wait for preliminary blood cultures to result    When above criteria met OR on date, may change iv antibiotics to an oral regimen vs continuing IV abx therapy  Cannot advise changing to oral antibiotic therapy until culture sensitivity is available.

## 2025-01-08 NOTE — BRIEF OPERATIVE NOTE - OPERATION/FINDINGS
Superior portion of wound noted to have minimal necrotic slough that was debrided, granulation tissue noted  Inferior edge of wound had underlying collection, and was serous was drained  Cultures sent from wound and fluid  Penrose drain left in seroma cavity and wound vac was applied

## 2025-01-08 NOTE — PROGRESS NOTE ADULT - ASSESSMENT
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  Medicine consulted for comanagement.    #R groin surgical site infection  -patient under vasc service followed by  ID   -BCX: NGTD  -C/w  vanco cefepime flagyl  -Plan for wound debridement 1/8/25 as per Sx     #chronic HFpEF  -TTE last month:  LV cavity moderately dilated, normal wall thickness, normal LVEF, severe (grade 3) LV diastolic dysfunction, severe LA dilatation, mod to moderate RA dilatation, normal RV size and function.   -no acute signs of HF at this time  -Hold  po lasix -and spironolactone as BP low for optimization   -Pt was given 250 cc of fluids prior  for hypotension  -monitor fluid status  -c/w metoprolol ER with hold parameters   - Farxiga on hold resume at  discharge   -Monitor weights    #Hx of Afib s/p ablation  -c/w Cardizem CD and amiodarone.   -patients xarelto being held - planned for procedure     #HLD  -on home Zetia    # Anemia,  iron deficiency, B12, folate def   and chronic Dz   -baseline 7s to 8s. normocytic.  -no signs or sx of bleeding at this time  -f/u iron studies:  mixed chronic  Dz and iron def   -B12/folate reviewed past admission, both low   -started  B12 SQ, Folate IV, switch to PO after 5 doses   -Started on Iron sucrose IV   -trend CBC     DVT ppx- SCDs    Will follow. Updated  at bedside.

## 2025-01-08 NOTE — CONSULT NOTE ADULT - ASSESSMENT
74-year-old F, with PMHx of DM, TAVR 8/2024 on Plavix , A-fib on Xarelto, CHF, HLD, PVD, b/l LE venous insufficiency, bilateral CFA stenoses s/p Rt SFA endarterectomy on 12/17/24with wound dehiscence presenting with concerns of R groin surgical site infection.     - will go to OR with Dr Manjarrez for exploration, washout and debridement and possible local muscle flap        -Over 45 minutes was spent on this evaluation. Time included previsit evaluation of medical record, face-to-face time, counseling the patient, and family, and post-visit documentation and coordination of care.

## 2025-01-08 NOTE — BRIEF OPERATIVE NOTE - NSICDXBRIEFPOSTOP_GEN_ALL_CORE_FT
POST-OP DIAGNOSIS:  Right groin wound 08-Jan-2025 20:34:54  Larisa Prieto  Postoperative seroma involving circulatory system after other circulatory system procedure 08-Jan-2025 20:35:53  Larisa Prieto

## 2025-01-08 NOTE — PROGRESS NOTE ADULT - ASSESSMENT
A: 75 y/o Female seen for the following:   -Rt heel partial thickness pressure wound, stable without acute SOI  -Lt posterior lower leg partial thickness venous stasis wound, stable without acute SOI  -BLE venous stasis  -PVD    P:   Chart reviewed and Patient evaluated  Wound to the posterior Left lower mid leg, with some serous drainage, no malodor, superficial in nature, stable without SOI. Rt heel stage 2 pressure ulcer; stable without any SOI. Wounds improved compared to previous admission.  Applied betadine DSD to the right heel and adaptic DSD to Left LE wound  WC: Santyl with dry sterile dressing to Rt heel. Santyl and Compressive dressings applied to Left Lower Extremity  Weight-Bearing-As-Tolerated to Bilateral Lower Extremity  Offload bilateral heels with two pillows under bilateral calfs or total CAIR boots to prevent further soft tissue damage as pt is has been predominately bedbound or sitting on a chair while placing pressure to heels.    No acute podiatric intervention warranted at this time. Continue with local wound care  Pt to follow up at [facility] with Dr. Stovall for continued wound care treatment.   All additional care per vascular and Med appreciated  Patient demonstrated verbal understanding of all interventions and tolerated interventions well without any complications  Podiatry will follow peripherally while in house      Case D/W with attending Dr. Stovall    Wound care instructions for the Rt heel and Lt lower leg to be changed every other day:  - Please apply santyl to the Right heel and Left lower leg  - Cover with gauze and fernando or kerlix, secure with tape.  Thank you

## 2025-01-08 NOTE — PROGRESS NOTE ADULT - SUBJECTIVE AND OBJECTIVE BOX
1/8/2025: Patient was seen by the podiatry team with the attending present. Pt was resting comfortably and eating bedside. No acute distress. No new complaints       PMH: Diabetes mellitus type II, controlled    Atrial fibrillation    Congestive heart failure    Dyspnea    Morbid obesity with BMI of 40.0-44.9, adult    Neuropathy    Peripheral venous insufficiency    Thyroid nodule    HTN (hypertension)    Cellulitis    At risk for sleep apnea      PSH:History of esophagogastroduodenoscopy (EGD)    H/O colonoscopy    Other complications of gastric band procedure    S/P left knee arthroscopy    Status post medial meniscus repair    History of cholecystectomy    S/P cataract surgery        Allergies:pregabalin (Unknown)  PC Pen VK (Rash)  latex (Unknown)  penicillin (Hives; Urticaria)      Labs:                              7.6    8.78  )-----------( 284      ( 08 Jan 2025 05:25 )             26.3                  01-08    135  |  102  |  21  ----------------------------<  98  4.4   |  30  |  0.89    Ca    8.7      08 Jan 2025 05:25  Phos  3.6     01-08  Mg     1.8     01-08    Vital Signs Last 24 Hrs  T(C): 37 (08 Jan 2025 07:11), Max: 37.1 (07 Jan 2025 23:51)  T(F): 98.6 (08 Jan 2025 07:11), Max: 98.8 (07 Jan 2025 23:51)  HR: 58 (08 Jan 2025 07:11) (58 - 63)  BP: 127/50 (08 Jan 2025 07:11) (114/50 - 143/54)  BP(mean): --  RR: 20 (07 Jan 2025 23:51) (18 - 20)  SpO2: 98% (08 Jan 2025 07:11) (95% - 98%)    Parameters below as of 08 Jan 2025 07:11  Patient On (Oxygen Delivery Method): room air        REVIEW OF SYSTEMS:    CONSTITUTIONAL: No weakness, fevers or chills  EYES: No visual changes  RESPIRATORY: No cough, wheezing; No shortness of breath  CARDIOVASCULAR: No chest pain or palpitations  GASTROINTESTINAL: No abdominal or epigastric pain. No nausea, vomiting; No diarrhea or constipation.   GENITOURINARY: No dysuria, frequency or hematuria  NEUROLOGICAL: No numbness or weakness  SKIN: See physical examination.  All other review of systems is negative unless indicated above    Physical Exam:   Constitutional: NAD, alert;  Lower Extremity Focus  Derm:  Skin warm, dry and supple bilateral.    Ulceration Right heel wound, partial thickness in nature, wound base granular wound size (approx 1.5 cm X 0.5cm X 0.1cm) and surrounding predominant hyperkeratotic tissue, no stephen-wound erythema/edema, no pus/purulence, no crepitus/fluctuance, no tracking/tunneling, no probe to bone.  Partial thickness ulceration noted to posterior medial Left lower midleg, some serous drainage (improving), superficial wound bed, no malodor,  no crepitus/fluctuance, no tracking/tunneling, no PTB.   Vascular: Dorsalis Pedis and Posterior Tibial pulses weakly palpable  Neuro: Protective sensation intact to the level of the digits bilateral.  MSK: Muscle strength 5/5 all major muscle groups bilateral. Pt is minimally ambulatory

## 2025-01-08 NOTE — BRIEF OPERATIVE NOTE - NSICDXBRIEFPROCEDURE_GEN_ALL_CORE_FT
PROCEDURES:  Wound VAC dressing change for area 50 sq cm or less 08-Jan-2025 20:36:20  Larisa Prieto  Irrigation and debridement of perineum and right groin 08-Jan-2025 20:36:47  Larisa Prieto  Drainage of seroma 08-Jan-2025 20:37:01  Larisa Prieto

## 2025-01-08 NOTE — CONSULT NOTE ADULT - SUBJECTIVE AND OBJECTIVE BOX
Pt is a 74y Female with PMHx of DM, TAVR 8/2024 on Plavix , A-fib on Xarelto, CHF, HLD, PVD, b/l LE venous insufficiency, bilateral CFA stenoses s/p Rt SFA endarterectomy on 12/17/24 with wound breakdown presenting with concerns of R groin surgical site infection      PMHx/PSHx- as above  Meds- as per chart  Allergies- latex, PCN, pregabalin  SocHx- denies    Family Hx- non contributory        ROS:  - CONSTITUTIONAL: Denies weight loss, fever and chills.  - HEENT: Denies changes in vision and hearing.  - RESPIRATORY: Denies SOB and cough.  - CV: Denies palpitations and CP.  - GI: Denies abdominal pain, nausea, vomiting and diarrhea.  - : Denies dysuria and urinary frequency.  - MSK: Denies myalgia and joint pain.  - SKIN: Denies rash and pruritus.  - NEUROLOGICAL: Denies headache and syncope.  - PSYCHIATRIC: Denies recent changes in mood. Denies anxiety and depression.    T(C): 36.6 (01-08-25 @ 16:01), Max: 37.1 (01-07-25 @ 23:51)  HR: 59 (01-08-25 @ 16:01) (57 - 63)  BP: 132/76 (01-08-25 @ 16:01) (114/50 - 132/76)  RR: 18 (01-08-25 @ 16:01) (18 - 20)  SpO2: 97% (01-08-25 @ 16:01) (95% - 98%)      PE: Gen- NAD  HEENT- EOMI  CVS- RRR  Chest- Equal Chest Expansion B/L  Abd- Soft NT, ND  Ext- FROMx4         Focused: 2x3cm open wound of right groin, no surrounding erythema, purulent discharge or obvious signs of infection      CT Abd/Pel: 12/31/24-   IMPRESSION:  10 cm in greatest dimension fluid collection superficial to the right   common femoral vessels. Given the history this most likely represents an   abscess. Subacute hematoma, seroma might also appear this way.    Biliary ductal dilation which has increased slightly. While most likely   compensatory and nonobstructive in this patient status post   cholecystectomy, obstruction remains possible though less likely.    Several other chronic findings as above.

## 2025-01-08 NOTE — PROGRESS NOTE ADULT - SUBJECTIVE AND OBJECTIVE BOX
Date of service: 01-08-25 @ 10:37    Lying in bed in NAD  Has right inguinal tenderness  No fever    ROS: no fever or chills; denies dizziness, no HA, no SOB or cough, no abdominal pain, no diarrhea or constipation; no dysuria, no legs pain, no rashes    MEDICATIONS  (STANDING):  aMIOdarone    Tablet 200 milliGRAM(s) Oral daily  cefepime  Injectable. 2000 milliGRAM(s) IV Push every 12 hours  clopidogrel Tablet 75 milliGRAM(s) Oral daily  collagenase Ointment 1 Application(s) Topical once  cyanocobalamin Injectable 1000 MICROGram(s) SubCutaneous daily  diltiazem    milliGRAM(s) Oral daily  ezetimibe 10 milliGRAM(s) Oral daily  folic acid Injectable 1 milliGRAM(s) IV Push daily  gabapentin 800 milliGRAM(s) Oral three times a day  influenza  Vaccine (HIGH DOSE) 0.5 milliLiter(s) IntraMuscular once  iron sucrose IVPB 100 milliGRAM(s) IV Intermittent every 24 hours  lactated ringers. 1000 milliLiter(s) (50 mL/Hr) IV Continuous <Continuous>  metoprolol succinate ER 25 milliGRAM(s) Oral daily  metroNIDAZOLE  IVPB 500 milliGRAM(s) IV Intermittent every 8 hours  nystatin Cream 1 Application(s) Topical every 12 hours  sodium chloride 0.9%. 1000 milliLiter(s) (75 mL/Hr) IV Continuous <Continuous>  vancomycin  IVPB 750 milliGRAM(s) IV Intermittent every 12 hours    Vital Signs Last 24 Hrs  T(C): 37 (08 Jan 2025 07:11), Max: 37.1 (07 Jan 2025 23:51)  T(F): 98.6 (08 Jan 2025 07:11), Max: 98.8 (07 Jan 2025 23:51)  HR: 58 (08 Jan 2025 07:11) (58 - 63)  BP: 127/50 (08 Jan 2025 07:11) (114/50 - 143/54)  BP(mean): --  RR: 20 (07 Jan 2025 23:51) (18 - 20)  SpO2: 98% (08 Jan 2025 07:11) (95% - 98%)    Parameters below as of 08 Jan 2025 07:11  Patient On (Oxygen Delivery Method): room air     Physical exam:    Constitutional:  No acute distress  HEENT: NC/AT, EOMI, PERRLA, conjunctivae clear; ears and nose atraumatic; pharynx benign  Neck: supple; thyroid not palpable  Back: no tenderness  Respiratory: respiratory effort normal; clear to auscultation  Cardiovascular: S1S2 regular, no murmurs  Abdomen: soft, not tender, not distended, positive BS; no liver or spleen organomegaly  Genitourinary: no suprapubic tenderness  Lymphatic: no LN palpable  Musculoskeletal: no muscle tenderness, no joint swelling or tenderness  Extremities: no pedal edema  Right inguinal wound with surrounding erythema - slightly improving  no drainage noted at this time  Neurological/ Psychiatric: AxOx3, judgement and insight normal; moving all extremities  Skin: no rashes; no palpable lesions    Labs: reviewed                        7.6    8.78  )-----------( 284      ( 08 Jan 2025 05:25 )             26.3     01-08    135  |  102  |  21  ----------------------------<  98  4.4   |  30  |  0.89    Ca    8.7      08 Jan 2025 05:25  Phos  3.6     01-08  Mg     1.8     01-08    Vancomycin Level, Trough: 10.6 ug/mL (01-06 @ 20:11)  Ferritin: 27 ng/mL (01-05-25 @ 07:39)                        7.4    6.26  )-----------( 241      ( 06 Jan 2025 07:03 )             26.0     01-06    138  |  105  |  16  ----------------------------<  103[H]  4.1   |  31  |  0.80    Ca    8.9      06 Jan 2025 07:03  Phos  4.1     01-06  Mg     1.8     01-06    Ferritin: 27 ng/mL (01-05-25 @ 07:39)                          7.1    7.39  )-----------( 253      ( 04 Jan 2025 07:16 )             24.4     01-04    137  |  107  |  19  ----------------------------<  107[H]  4.0   |  26  |  0.75    Ca    8.9      04 Jan 2025 07:16  Phos  4.1     01-04  Mg     1.7     01-04    TPro  7.5  /  Alb  2.9[L]  /  TBili  0.2  /  DBili  x   /  AST  8[L]  /  ALT  12  /  AlkPhos  99  01-03     LIVER FUNCTIONS - ( 03 Jan 2025 13:48 )  Alb: 2.9 g/dL / Pro: 7.5 gm/dL / ALK PHOS: 99 U/L / ALT: 12 U/L / AST: 8 U/L / GGT: x           Culture - Blood (collected 03 Jan 2025 13:48)  Source: .Blood BLOOD  Preliminary Report (07 Jan 2025 19:00):    No growth at 4 days    Culture - Blood (collected 03 Jan 2025 13:48)  Source: .Blood BLOOD  Preliminary Report (07 Jan 2025 19:00):    No growth at 4 days    Culture - Blood (collected 30 Dec 2024 21:40)  Source: .Blood BLOOD  Final Report (05 Jan 2025 03:01):    No growth at 5 days    Radiology: all available radiological tests reviewed    < from: CT Abdomen and Pelvis w/ IV Cont (12.31.24 @ 04:55) >  10 cm in greatest dimension fluid collection superficial to the right common femoral vessels. Given the history this most likely represents an abscess. Subacute hematoma, seroma might also appear this way.  Biliary ductal dilation which has increased slightly. While most likely compensatory and nonobstructive in this patient status post cholecystectomy, obstruction remains possible though less likely.  < end of copied text >    Advanced directives addressed: full resuscitation

## 2025-01-08 NOTE — PROGRESS NOTE ADULT - ASSESSMENT
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       PLAN  OR today for wound washout and closure  local wound care with betadine painting bid  ID consulted; appreciate recs  AC on hold  Podiatry consult : Dressing changes of foot wounds q48hrs  Resumed home meds      Plan discussed with vascular surgery attending

## 2025-01-08 NOTE — PROGRESS NOTE ADULT - SUBJECTIVE AND OBJECTIVE BOX
HOSPITALIST ATTENDING PROGRESS NOTE    Chart and meds reviewed.  Patient seen and examined.    CC: groin collection    Subjective: For washout today w vascular.    All other systems reviewed and found to be negative with the exception of what has been described above.    MEDICATIONS  (STANDING):  aMIOdarone    Tablet 200 milliGRAM(s) Oral daily  cefepime  Injectable. 2000 milliGRAM(s) IV Push every 12 hours  clopidogrel Tablet 75 milliGRAM(s) Oral daily  collagenase Ointment 1 Application(s) Topical once  cyanocobalamin Injectable 1000 MICROGram(s) SubCutaneous daily  diltiazem    milliGRAM(s) Oral daily  ezetimibe 10 milliGRAM(s) Oral daily  folic acid Injectable 1 milliGRAM(s) IV Push daily  gabapentin 800 milliGRAM(s) Oral three times a day  influenza  Vaccine (HIGH DOSE) 0.5 milliLiter(s) IntraMuscular once  iron sucrose IVPB 100 milliGRAM(s) IV Intermittent every 24 hours  lactated ringers. 1000 milliLiter(s) (50 mL/Hr) IV Continuous <Continuous>  metoprolol succinate ER 25 milliGRAM(s) Oral daily  metroNIDAZOLE  IVPB 500 milliGRAM(s) IV Intermittent every 8 hours  nystatin Cream 1 Application(s) Topical every 12 hours  sodium chloride 0.9%. 1000 milliLiter(s) (75 mL/Hr) IV Continuous <Continuous>  vancomycin  IVPB 750 milliGRAM(s) IV Intermittent every 12 hours    MEDICATIONS  (PRN):  acetaminophen     Tablet .. 650 milliGRAM(s) Oral every 6 hours PRN Mild Pain (1 - 3)  ondansetron   Disintegrating Tablet 4 milliGRAM(s) Oral every 6 hours PRN Nausea and/or Vomiting  oxyCODONE    IR 10 milliGRAM(s) Oral every 8 hours PRN Severe Pain (7 - 10)  zolpidem 10 milliGRAM(s) Oral at bedtime PRN Insomnia      VITALS:  T(F): 98.6 (01-08-25 @ 07:11), Max: 98.8 (01-07-25 @ 23:51)  HR: 57 (01-08-25 @ 11:02) (57 - 63)  BP: 126/51 (01-08-25 @ 11:02) (114/50 - 127/50)  RR: 18 (01-08-25 @ 11:02) (18 - 20)  SpO2: 97% (01-08-25 @ 11:02) (95% - 98%)  Wt(kg): --    I&O's Summary      CAPILLARY BLOOD GLUCOSE      POCT Blood Glucose.: 106 mg/dL (08 Jan 2025 12:17)      PHYSICAL EXAM:  Gen: NAD  HEENT:  pupils equal and reactive, EOMI, no oropharyngeal lesions, erythema, exudates, oral thrush  NECK:   supple, no carotid bruits, no palpable lymph nodes, no thyromegaly  CV:  +S1, +S2, regular, no murmurs or rubs  RESP:   lungs clear to auscultation bilaterally, no wheezing, rales, rhonchi, good air entry bilaterally  BREAST:  not examined  GI:  abdomen soft, non-tender, non-distended, normal BS, no bruits, no abdominal masses, no palpable masses  RECTAL:  not examined  :  not examined  MSK:   normal muscle tone, no atrophy, no rigidity, no contractions  EXT:  b/l LE edema, R groin dressing in place  VASCULAR:  pulses equal and symmetric in the upper and lower extremities  NEURO:  AAOX3, no focal neurological deficits, follows all commands, able to move extremities spontaneously  SKIN:  no ulcers, lesions or rashes    LABS:                            7.6    8.78  )-----------( 284      ( 08 Jan 2025 05:25 )             26.3     01-08    135  |  102  |  21  ----------------------------<  98  4.4   |  30  |  0.89    Ca    8.7      08 Jan 2025 05:25  Phos  3.6     01-08  Mg     1.8     01-08            PT/INR - ( 08 Jan 2025 05:25 )   PT: 13.7 sec;   INR: 1.16 ratio           Urinalysis Basic - ( 08 Jan 2025 05:25 )    Color: x / Appearance: x / SG: x / pH: x  Gluc: 98 mg/dL / Ketone: x  / Bili: x / Urobili: x   Blood: x / Protein: x / Nitrite: x   Leuk Esterase: x / RBC: x / WBC x   Sq Epi: x / Non Sq Epi: x / Bacteria: x    CULTURES:  BCx NG    Additional results/Imaging, I have personally reviewed:  CT a/p w iv contrast 12/31/24: 10 cm in greatest dimension fluid collection superficial to the right common femoral vessels. Given the history this most likely represents an abscess. Subacute hematoma, seroma might also appear this way.   Biliary ductal dilation which has increased slightly. While most likely compensatory and nonobstructive in this patient status post cholecystectomy, obstruction remains possible though less likely.

## 2025-01-09 LAB
ANION GAP SERPL CALC-SCNC: 4 MMOL/L — LOW (ref 5–17)
BUN SERPL-MCNC: 19 MG/DL — SIGNIFICANT CHANGE UP (ref 7–23)
CALCIUM SERPL-MCNC: 9.2 MG/DL — SIGNIFICANT CHANGE UP (ref 8.5–10.1)
CHLORIDE SERPL-SCNC: 103 MMOL/L — SIGNIFICANT CHANGE UP (ref 96–108)
CO2 SERPL-SCNC: 25 MMOL/L — SIGNIFICANT CHANGE UP (ref 22–31)
CREAT SERPL-MCNC: 0.69 MG/DL — SIGNIFICANT CHANGE UP (ref 0.5–1.3)
EGFR: 90 ML/MIN/1.73M2 — SIGNIFICANT CHANGE UP
GLUCOSE BLDC GLUCOMTR-MCNC: 131 MG/DL — HIGH (ref 70–99)
GLUCOSE SERPL-MCNC: 134 MG/DL — HIGH (ref 70–99)
GRAM STN FLD: SIGNIFICANT CHANGE UP
HCT VFR BLD CALC: 28 % — LOW (ref 34.5–45)
HGB BLD-MCNC: 8.2 G/DL — LOW (ref 11.5–15.5)
MAGNESIUM SERPL-MCNC: 2 MG/DL — SIGNIFICANT CHANGE UP (ref 1.6–2.6)
MCHC RBC-ENTMCNC: 24.6 PG — LOW (ref 27–34)
MCHC RBC-ENTMCNC: 29.3 G/DL — LOW (ref 32–36)
MCV RBC AUTO: 84.1 FL — SIGNIFICANT CHANGE UP (ref 80–100)
NIGHT BLUE STAIN TISS: SIGNIFICANT CHANGE UP
PHOSPHATE SERPL-MCNC: 3.5 MG/DL — SIGNIFICANT CHANGE UP (ref 2.5–4.5)
PLATELET # BLD AUTO: 286 K/UL — SIGNIFICANT CHANGE UP (ref 150–400)
POTASSIUM SERPL-MCNC: 4.7 MMOL/L — SIGNIFICANT CHANGE UP (ref 3.5–5.3)
POTASSIUM SERPL-SCNC: 4.7 MMOL/L — SIGNIFICANT CHANGE UP (ref 3.5–5.3)
RBC # BLD: 3.33 M/UL — LOW (ref 3.8–5.2)
RBC # FLD: 16.3 % — HIGH (ref 10.3–14.5)
SODIUM SERPL-SCNC: 132 MMOL/L — LOW (ref 135–145)
SPECIMEN SOURCE: SIGNIFICANT CHANGE UP
SPECIMEN SOURCE: SIGNIFICANT CHANGE UP
WBC # BLD: 10.19 K/UL — SIGNIFICANT CHANGE UP (ref 3.8–10.5)
WBC # FLD AUTO: 10.19 K/UL — SIGNIFICANT CHANGE UP (ref 3.8–10.5)

## 2025-01-09 PROCEDURE — 99232 SBSQ HOSP IP/OBS MODERATE 35: CPT

## 2025-01-09 RX ORDER — MORPHINE SULFATE 15 MG
2 TABLET, EXTENDED RELEASE ORAL EVERY 6 HOURS
Refills: 0 | Status: DISCONTINUED | OUTPATIENT
Start: 2025-01-09 | End: 2025-01-14

## 2025-01-09 RX ADMIN — Medication 25 MILLIGRAM(S): at 10:10

## 2025-01-09 RX ADMIN — Medication 10 MILLIGRAM(S): at 21:27

## 2025-01-09 RX ADMIN — METRONIDAZOLE 100 MILLIGRAM(S): 250 TABLET ORAL at 21:27

## 2025-01-09 RX ADMIN — VANCOMYCIN HYDROCHLORIDE 250 MILLIGRAM(S): 5 INJECTION, POWDER, LYOPHILIZED, FOR SOLUTION INTRAVENOUS at 10:10

## 2025-01-09 RX ADMIN — CYANOCOBALAMIN 1000 MICROGRAM(S): 1000 INJECTION, SOLUTION INTRAMUSCULAR; SUBCUTANEOUS at 10:09

## 2025-01-09 RX ADMIN — CLOPIDOGREL BISULFATE 75 MILLIGRAM(S): 75 TABLET, FILM COATED ORAL at 10:09

## 2025-01-09 RX ADMIN — GABAPENTIN 800 MILLIGRAM(S): 300 CAPSULE ORAL at 14:33

## 2025-01-09 RX ADMIN — NYSTATIN TOPICAL POWDER 1 APPLICATION(S): 100000 POWDER TOPICAL at 12:29

## 2025-01-09 RX ADMIN — GABAPENTIN 800 MILLIGRAM(S): 300 CAPSULE ORAL at 21:27

## 2025-01-09 RX ADMIN — AMIODARONE HYDROCHLORIDE 200 MILLIGRAM(S): 200 TABLET ORAL at 10:09

## 2025-01-09 RX ADMIN — Medication 2 MILLIGRAM(S): at 08:09

## 2025-01-09 RX ADMIN — EZETIMIBE 10 MILLIGRAM(S): 10 TABLET ORAL at 10:10

## 2025-01-09 RX ADMIN — NYSTATIN TOPICAL POWDER 1 APPLICATION(S): 100000 POWDER TOPICAL at 21:28

## 2025-01-09 RX ADMIN — ACETAMINOPHEN 650 MILLIGRAM(S): 80 SOLUTION/ DROPS ORAL at 23:52

## 2025-01-09 RX ADMIN — Medication 2000 MILLIGRAM(S): at 21:27

## 2025-01-09 RX ADMIN — Medication 2000 MILLIGRAM(S): at 10:10

## 2025-01-09 RX ADMIN — VANCOMYCIN HYDROCHLORIDE 250 MILLIGRAM(S): 5 INJECTION, POWDER, LYOPHILIZED, FOR SOLUTION INTRAVENOUS at 18:07

## 2025-01-09 RX ADMIN — Medication 1 MILLIGRAM(S): at 10:10

## 2025-01-09 RX ADMIN — DILTIAZEM HYDROCHLORIDE 120 MILLIGRAM(S): 300 CAPSULE, COATED, EXTENDED RELEASE ORAL at 10:10

## 2025-01-09 RX ADMIN — Medication 10 MILLIGRAM(S): at 23:52

## 2025-01-09 RX ADMIN — GABAPENTIN 800 MILLIGRAM(S): 300 CAPSULE ORAL at 05:36

## 2025-01-09 RX ADMIN — METRONIDAZOLE 100 MILLIGRAM(S): 250 TABLET ORAL at 14:34

## 2025-01-09 RX ADMIN — Medication 10 MILLIGRAM(S): at 05:38

## 2025-01-09 RX ADMIN — METRONIDAZOLE 100 MILLIGRAM(S): 250 TABLET ORAL at 05:07

## 2025-01-09 RX ADMIN — Medication 10 MILLIGRAM(S): at 05:05

## 2025-01-09 NOTE — PROGRESS NOTE ADULT - ASSESSMENT
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     POD1 s/p right groin excisional debridement, drainage of seroma and wound vac placement    PLAN  Maintain wound vac  ID consulted; appreciated recs  AC on hold, will restart when safe  Podiatry consult : Dressing changes of foot wounds q48hrs  Resumed home meds    Will start planning for discharge after next vac change: ID outpt recs, home wound vac and vns, outpt podiatry recommendations    Plan discussed with vascular surgery attending

## 2025-01-09 NOTE — PROGRESS NOTE ADULT - SUBJECTIVE AND OBJECTIVE BOX
Date of service: 01-09-25 @ 09:28    Lying in bed in NAD  s/p surgery  Noted with seroma --> uperior portion of wound noted to have minimal necrotic slough that was debrided, granulation tissue noted, Inferior edge of wound had underlying collection, and was serous was drained  Cultures sent from wound and fluid  Penrose drain left in seroma cavity and wound vac was applied    ROS: no fever or chills; denies dizziness, no HA, no SOB or cough, no abdominal pain, no diarrhea or constipation; no dysuria, no legs pain, no rashes    MEDICATIONS  (STANDING):  aMIOdarone    Tablet 200 milliGRAM(s) Oral daily  cefepime  Injectable. 2000 milliGRAM(s) IV Push every 12 hours  clopidogrel Tablet 75 milliGRAM(s) Oral daily  collagenase Ointment 1 Application(s) Topical once  cyanocobalamin Injectable 1000 MICROGram(s) SubCutaneous daily  diltiazem    milliGRAM(s) Oral daily  ezetimibe 10 milliGRAM(s) Oral daily  folic acid Injectable 1 milliGRAM(s) IV Push daily  gabapentin 800 milliGRAM(s) Oral three times a day  influenza  Vaccine (HIGH DOSE) 0.5 milliLiter(s) IntraMuscular once  lactated ringers. 1000 milliLiter(s) (50 mL/Hr) IV Continuous <Continuous>  metoprolol succinate ER 25 milliGRAM(s) Oral daily  metroNIDAZOLE  IVPB 500 milliGRAM(s) IV Intermittent every 8 hours  nystatin Cream 1 Application(s) Topical every 12 hours  vancomycin  IVPB 750 milliGRAM(s) IV Intermittent every 12 hours    Vital Signs Last 24 Hrs  T(C): 36.9 (09 Jan 2025 07:29), Max: 37 (08 Jan 2025 23:15)  T(F): 98.5 (09 Jan 2025 07:29), Max: 98.6 (08 Jan 2025 23:15)  HR: 61 (09 Jan 2025 07:29) (56 - 64)  BP: 143/47 (09 Jan 2025 07:29) (116/59 - 144/52)  BP(mean): --  RR: 18 (09 Jan 2025 07:29) (12 - 19)  SpO2: 99% (09 Jan 2025 07:29) (96% - 100%)    Parameters below as of 09 Jan 2025 07:29  Patient On (Oxygen Delivery Method): room air     Physical exam:    Constitutional:  No acute distress  HEENT: NC/AT, EOMI, PERRLA, conjunctivae clear; ears and nose atraumatic; pharynx benign  Neck: supple; thyroid not palpable  Back: no tenderness  Respiratory: respiratory effort normal; clear to auscultation  Cardiovascular: S1S2 regular, no murmurs  Abdomen: soft, not tender, not distended, positive BS; no liver or spleen organomegaly  Genitourinary: no suprapubic tenderness  Lymphatic: no LN palpable  Musculoskeletal: no muscle tenderness, no joint swelling or tenderness  Extremities: no pedal edema  Right inguinal wound with surrounding erythema - improving; VAC  no drainage noted at this time  Neurological/ Psychiatric: AxOx3, judgement and insight normal; moving all extremities  Skin: no rashes; no palpable lesions    Labs: reviewed                        7.6    8.78  )-----------( 284      ( 08 Jan 2025 05:25 )             26.3     01-08    135  |  102  |  21  ----------------------------<  98  4.4   |  30  |  0.89    Ca    8.7      08 Jan 2025 05:25  Phos  3.6     01-08  Mg     1.8     01-08    Vancomycin Level, Trough: 10.6 ug/mL (01-06 @ 20:11)  Ferritin: 27 ng/mL (01-05-25 @ 07:39)                        7.4    6.26  )-----------( 241      ( 06 Jan 2025 07:03 )             26.0     01-06    138  |  105  |  16  ----------------------------<  103[H]  4.1   |  31  |  0.80    Ca    8.9      06 Jan 2025 07:03  Phos  4.1     01-06  Mg     1.8     01-06    Ferritin: 27 ng/mL (01-05-25 @ 07:39)                          7.1    7.39  )-----------( 253      ( 04 Jan 2025 07:16 )             24.4     01-04    137  |  107  |  19  ----------------------------<  107[H]  4.0   |  26  |  0.75    Ca    8.9      04 Jan 2025 07:16  Phos  4.1     01-04  Mg     1.7     01-04    TPro  7.5  /  Alb  2.9[L]  /  TBili  0.2  /  DBili  x   /  AST  8[L]  /  ALT  12  /  AlkPhos  99  01-03     LIVER FUNCTIONS - ( 03 Jan 2025 13:48 )  Alb: 2.9 g/dL / Pro: 7.5 gm/dL / ALK PHOS: 99 U/L / ALT: 12 U/L / AST: 8 U/L / GGT: x           Culture - Blood (collected 03 Jan 2025 13:48)  Source: .Blood BLOOD  Preliminary Report (07 Jan 2025 19:00):    No growth at 4 days    Culture - Blood (collected 03 Jan 2025 13:48)  Source: .Blood BLOOD  Preliminary Report (07 Jan 2025 19:00):    No growth at 4 days    Culture - Blood (collected 30 Dec 2024 21:40)  Source: .Blood BLOOD  Final Report (05 Jan 2025 03:01):    No growth at 5 days    Radiology: all available radiological tests reviewed    < from: CT Abdomen and Pelvis w/ IV Cont (12.31.24 @ 04:55) >  10 cm in greatest dimension fluid collection superficial to the right common femoral vessels. Given the history this most likely represents an abscess. Subacute hematoma, seroma might also appear this way.  Biliary ductal dilation which has increased slightly. While most likely compensatory and nonobstructive in this patient status post cholecystectomy, obstruction remains possible though less likely.  < end of copied text >    Advanced directives addressed: full resuscitation

## 2025-01-09 NOTE — PROGRESS NOTE ADULT - ASSESSMENT
74-year-old Female with h/o DM type 2, TAVR 8/2024 on Plavix , A-fib on Xarelto, CHF, HLD, PVD, b/l LE venous insufficiency, bilateral CFA stenoses s/p Rt SFA endarterectomy on 12/17/24 was admitted on 1/3 with purulent drainage at the R groin surgical site. She reported subjective fevers. She was seen recently at [facility] and was treated with oral abx and local wound care,  but symptoms have not improved. In ER she received clindamycin IV.    #Right inguinal cellulitis/ postsurgical infection s/p right SFA endarterectomy ?graft in place?  #Right inguinal collection, likely seroma ?abscess ?hematoma?  #PVD  #Allergy to PCN  -right inguinal erythema improved  -surgical c/s collected   -on vancomycin 750 mg IV q12h, cefepime 2 gm IV q12h and metronidazole 500 mg IV q8h # 6  -tolerating abx well so far; no side effects noted  -vancomycin trough level is therapeutic  -monitor closely in ej of PCN allergy history  -surgical evaluation --> s/p wound washout and VAC  -local wound care  -continue abx coverage   -f/u cultures  -monitor temps  -f/u CBC  -supportive care  2. Other issues:   -care per medicine    Clinical team may change from intravenous to oral antibiotics when the following criteria are met:   1. Patient is clinically improving/stable       a)	Improved signs and symptoms of infection from initial presentation       b)	Afebrile for 24 hours       c)	Leukocytosis trending towards normal range   2. Patient is tolerating oral intake   3. Initial/repeat blood cultures are negative OR do not need to wait for preliminary blood cultures to result    When above criteria met OR on date, may change iv antibiotics to an oral regimen vs continuing IV abx therapy  Cannot advise changing to oral antibiotic therapy until culture sensitivity is available.

## 2025-01-09 NOTE — PROGRESS NOTE ADULT - ASSESSMENT
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  Medicine consulted for comanagement.    #R groin surgical site infection  -patient under vasc service followed by  ID   -BCX: NGTD  -tissue cx pending  -C/w  vanco cefepime flagyl  -s/p wound debridement w penrose drain and wound vac placement on 1/8/25    #chronic HFpEF  -TTE last month:  LV cavity moderately dilated, normal wall thickness, normal LVEF, severe (grade 3) LV diastolic dysfunction, severe LA dilatation, mod to moderate RA dilatation, normal RV size and function.   -no acute signs of HF at this time  -Hold  po lasix -and spironolactone as BP low for optimization   -IVF stopped  -c/w metoprolol ER with hold parameters   - Farxiga on hold resume at  discharge   -Monitor weights    #Hx of Afib s/p ablation  -c/w Cardizem CD and amiodarone.   -xarelto held, will resume when ok w vascular    #HLD  -on home Zetia    # Anemia,  iron deficiency, B12, folate def   and chronic Dz   -baseline 7s to 8s. normocytic.  -no signs or sx of bleeding at this time  -f/u iron studies:  mixed chronic  Dz and iron def   -B12/folate reviewed past admission, both low   -started  B12 SQ, Folate IV, switch to PO after 5 doses   -s/p 3 doses iv Fe  -trend CBC     DVT ppx- SCDs    Will follow.

## 2025-01-09 NOTE — PROGRESS NOTE ADULT - SUBJECTIVE AND OBJECTIVE BOX
SURGERY DAILY PROGRESS NOTE:     Subjective:  Patient seen and examined postoperatively. Pain is well controlled, ambulated, tolerated diet no N/V, using IS. wound vac to suction at 125 no leak, scant serosang output  Objective:    MEDICATIONS  (STANDING):  aMIOdarone    Tablet 200 milliGRAM(s) Oral daily  cefepime  Injectable. 2000 milliGRAM(s) IV Push every 12 hours  clopidogrel Tablet 75 milliGRAM(s) Oral daily  collagenase Ointment 1 Application(s) Topical once  cyanocobalamin Injectable 1000 MICROGram(s) SubCutaneous daily  diltiazem    milliGRAM(s) Oral daily  ezetimibe 10 milliGRAM(s) Oral daily  folic acid Injectable 1 milliGRAM(s) IV Push daily  gabapentin 800 milliGRAM(s) Oral three times a day  influenza  Vaccine (HIGH DOSE) 0.5 milliLiter(s) IntraMuscular once  lactated ringers. 1000 milliLiter(s) (50 mL/Hr) IV Continuous <Continuous>  metoprolol succinate ER 25 milliGRAM(s) Oral daily  metroNIDAZOLE  IVPB 500 milliGRAM(s) IV Intermittent every 8 hours  nystatin Cream 1 Application(s) Topical every 12 hours  vancomycin  IVPB 750 milliGRAM(s) IV Intermittent every 12 hours    MEDICATIONS  (PRN):  acetaminophen     Tablet .. 650 milliGRAM(s) Oral every 6 hours PRN Mild Pain (1 - 3)  ondansetron   Disintegrating Tablet 4 milliGRAM(s) Oral every 6 hours PRN Nausea and/or Vomiting  oxyCODONE    IR 10 milliGRAM(s) Oral every 8 hours PRN Severe Pain (7 - 10)  zolpidem 10 milliGRAM(s) Oral at bedtime PRN Insomnia      Vital Signs Last 24 Hrs  T(C): 37 (08 Jan 2025 23:15), Max: 37 (08 Jan 2025 07:11)  T(F): 98.6 (08 Jan 2025 23:15), Max: 98.6 (08 Jan 2025 07:11)  HR: 58 (08 Jan 2025 23:15) (56 - 64)  BP: 125/67 (08 Jan 2025 23:15) (116/59 - 144/52)  BP(mean): --  RR: 18 (08 Jan 2025 23:15) (12 - 19)  SpO2: 99% (08 Jan 2025 23:15) (96% - 100%)    Parameters below as of 08 Jan 2025 23:15  Patient On (Oxygen Delivery Method): nasal cannula  O2 Flow (L/min): 2        PHYSICAL EXAM   Gen: well-appearing, in no acute distress  CV: pulse regularly present   Resp: airway patent, non-labored breathing  Abd: soft, NTND; no rebound or guarding. Incision c/d/i      I&O's Detail    08 Jan 2025 07:01  -  09 Jan 2025 02:19  --------------------------------------------------------  IN:    Oral Fluid: 50 mL    Other (mL): 400 mL  Total IN: 450 mL    OUT:    Voided (mL): 1 mL  Total OUT: 1 mL    Total NET: 449 mL          Daily     Daily     LABS:                        7.6    8.78  )-----------( 284      ( 08 Jan 2025 05:25 )             26.3     01-08    135  |  102  |  21  ----------------------------<  98  4.4   |  30  |  0.89    Ca    8.7      08 Jan 2025 05:25  Phos  3.6     01-08  Mg     1.8     01-08      PT/INR - ( 08 Jan 2025 05:25 )   PT: 13.7 sec;   INR: 1.16 ratio           Urinalysis Basic - ( 08 Jan 2025 05:25 )    Color: x / Appearance: x / SG: x / pH: x  Gluc: 98 mg/dL / Ketone: x  / Bili: x / Urobili: x   Blood: x / Protein: x / Nitrite: x   Leuk Esterase: x / RBC: x / WBC x   Sq Epi: x / Non Sq Epi: x / Bacteria: x        RADIOLOGY & ADDITIONAL STUDIES:    ASSESSMENT/PLAN:

## 2025-01-09 NOTE — PROGRESS NOTE ADULT - SUBJECTIVE AND OBJECTIVE BOX
HOSPITALIST ATTENDING PROGRESS NOTE    Chart and meds reviewed.  Patient seen and examined.    CC: surgical site infection    Subjective: Had washout w penrose drain placement and wound vac placement on 1/8    All other systems reviewed and found to be negative with the exception of what has been described above.    MEDICATIONS  (STANDING):  aMIOdarone    Tablet 200 milliGRAM(s) Oral daily  cefepime  Injectable. 2000 milliGRAM(s) IV Push every 12 hours  clopidogrel Tablet 75 milliGRAM(s) Oral daily  collagenase Ointment 1 Application(s) Topical once  cyanocobalamin Injectable 1000 MICROGram(s) SubCutaneous daily  diltiazem    milliGRAM(s) Oral daily  ezetimibe 10 milliGRAM(s) Oral daily  folic acid Injectable 1 milliGRAM(s) IV Push daily  gabapentin 800 milliGRAM(s) Oral three times a day  influenza  Vaccine (HIGH DOSE) 0.5 milliLiter(s) IntraMuscular once  lactated ringers. 1000 milliLiter(s) (50 mL/Hr) IV Continuous <Continuous>  metoprolol succinate ER 25 milliGRAM(s) Oral daily  metroNIDAZOLE  IVPB 500 milliGRAM(s) IV Intermittent every 8 hours  nystatin Cream 1 Application(s) Topical every 12 hours  vancomycin  IVPB 750 milliGRAM(s) IV Intermittent every 12 hours    MEDICATIONS  (PRN):  acetaminophen     Tablet .. 650 milliGRAM(s) Oral every 6 hours PRN Mild Pain (1 - 3)  morphine  - Injectable 2 milliGRAM(s) IV Push every 6 hours PRN Severe Pain (7 - 10)  ondansetron   Disintegrating Tablet 4 milliGRAM(s) Oral every 6 hours PRN Nausea and/or Vomiting  oxyCODONE    IR 10 milliGRAM(s) Oral every 8 hours PRN Severe Pain (7 - 10)  zolpidem 10 milliGRAM(s) Oral at bedtime PRN Insomnia      VITALS:  T(F): 98.5 (01-09-25 @ 07:29), Max: 98.6 (01-08-25 @ 23:15)  HR: 61 (01-09-25 @ 07:29) (56 - 64)  BP: 143/47 (01-09-25 @ 07:29) (116/59 - 144/52)  RR: 18 (01-09-25 @ 07:29) (12 - 19)  SpO2: 99% (01-09-25 @ 07:29) (96% - 100%)  Wt(kg): --    I&O's Summary    08 Jan 2025 07:01  -  09 Jan 2025 07:00  --------------------------------------------------------  IN: 1600 mL / OUT: 52 mL / NET: 1548 mL        CAPILLARY BLOOD GLUCOSE      POCT Blood Glucose.: 131 mg/dL (09 Jan 2025 08:17)      PHYSICAL EXAM:  Gen: NAD  HEENT:  pupils equal and reactive, EOMI, no oropharyngeal lesions, erythema, exudates, oral thrush  NECK:   supple, no carotid bruits, no palpable lymph nodes, no thyromegaly  CV:  +S1, +S2, regular, no murmurs or rubs  RESP:   lungs clear to auscultation bilaterally, no wheezing, rales, rhonchi, good air entry bilaterally  BREAST:  not examined  GI:  abdomen soft, non-tender, non-distended, normal BS, no bruits, no abdominal masses, no palpable masses  RECTAL:  not examined  :  not examined  MSK:   normal muscle tone, no atrophy, no rigidity, no contractions  EXT:  no clubbing, no cyanosis, no edema, no calf pain, swelling or erythema, + wound vac  VASCULAR:  pulses equal and symmetric in the upper and lower extremities  NEURO:  AAOX3, no focal neurological deficits, follows all commands, able to move extremities spontaneously  SKIN:  no ulcers, lesions or rashes    LABS:                            8.2    10.19 )-----------( 286      ( 09 Jan 2025 06:50 )             28.0     01-09    132[L]  |  103  |  19  ----------------------------<  134[H]  4.7   |  25  |  0.69    Ca    9.2      09 Jan 2025 06:50  Phos  3.5     01-09  Mg     2.0     01-09            PT/INR - ( 08 Jan 2025 05:25 )   PT: 13.7 sec;   INR: 1.16 ratio           Urinalysis Basic - ( 09 Jan 2025 06:50 )    Color: x / Appearance: x / SG: x / pH: x  Gluc: 134 mg/dL / Ketone: x  / Bili: x / Urobili: x   Blood: x / Protein: x / Nitrite: x   Leuk Esterase: x / RBC: x / WBC x   Sq Epi: x / Non Sq Epi: x / Bacteria: x              CULTURES:  Culture Results:   Testing in progress (01-08 @ 17:45)  BCx NG  Tissue cx pending    Additional results/Imaging, I have personally reviewed:  CT a/p w iv contrast 12/31/24: 10 cm in greatest dimension fluid collection superficial to the right common femoral vessels. Given the history this most likely represents an abscess. Subacute hematoma, seroma might also appear this way.   Biliary ductal dilation which has increased slightly. While most likely compensatory and nonobstructive in this patient status post cholecystectomy, obstruction remains possible though less likely.

## 2025-01-10 LAB
ANION GAP SERPL CALC-SCNC: 4 MMOL/L — LOW (ref 5–17)
BUN SERPL-MCNC: 31 MG/DL — HIGH (ref 7–23)
CALCIUM SERPL-MCNC: 9.1 MG/DL — SIGNIFICANT CHANGE UP (ref 8.5–10.1)
CHLORIDE SERPL-SCNC: 103 MMOL/L — SIGNIFICANT CHANGE UP (ref 96–108)
CO2 SERPL-SCNC: 27 MMOL/L — SIGNIFICANT CHANGE UP (ref 22–31)
CREAT SERPL-MCNC: 0.93 MG/DL — SIGNIFICANT CHANGE UP (ref 0.5–1.3)
EGFR: 64 ML/MIN/1.73M2 — SIGNIFICANT CHANGE UP
GLUCOSE SERPL-MCNC: 127 MG/DL — HIGH (ref 70–99)
GRAM STN FLD: ABNORMAL
HCT VFR BLD CALC: 26.2 % — LOW (ref 34.5–45)
HGB BLD-MCNC: 7.6 G/DL — LOW (ref 11.5–15.5)
MCHC RBC-ENTMCNC: 24.7 PG — LOW (ref 27–34)
MCHC RBC-ENTMCNC: 29 G/DL — LOW (ref 32–36)
MCV RBC AUTO: 85.1 FL — SIGNIFICANT CHANGE UP (ref 80–100)
PLATELET # BLD AUTO: 352 K/UL — SIGNIFICANT CHANGE UP (ref 150–400)
POTASSIUM SERPL-MCNC: 4.2 MMOL/L — SIGNIFICANT CHANGE UP (ref 3.5–5.3)
POTASSIUM SERPL-SCNC: 4.2 MMOL/L — SIGNIFICANT CHANGE UP (ref 3.5–5.3)
RBC # BLD: 3.08 M/UL — LOW (ref 3.8–5.2)
RBC # FLD: 16.8 % — HIGH (ref 10.3–14.5)
SODIUM SERPL-SCNC: 134 MMOL/L — LOW (ref 135–145)
VANCOMYCIN TROUGH SERPL-MCNC: 13 UG/ML — SIGNIFICANT CHANGE UP (ref 10–20)
WBC # BLD: 13.42 K/UL — HIGH (ref 3.8–10.5)
WBC # FLD AUTO: 13.42 K/UL — HIGH (ref 3.8–10.5)

## 2025-01-10 PROCEDURE — 99233 SBSQ HOSP IP/OBS HIGH 50: CPT

## 2025-01-10 RX ORDER — HEPARIN SODIUM 1000 [USP'U]/ML
5000 INJECTION, SOLUTION INTRAVENOUS; SUBCUTANEOUS EVERY 8 HOURS
Refills: 0 | Status: DISCONTINUED | OUTPATIENT
Start: 2025-01-10 | End: 2025-01-10

## 2025-01-10 RX ORDER — RIVAROXABAN 2.5 MG/1
20 TABLET, FILM COATED ORAL
Refills: 0 | Status: DISCONTINUED | OUTPATIENT
Start: 2025-01-10 | End: 2025-01-14

## 2025-01-10 RX ORDER — MONTELUKAST SODIUM 10 MG/1
10 TABLET, FILM COATED ORAL AT BEDTIME
Refills: 0 | Status: DISCONTINUED | OUTPATIENT
Start: 2025-01-10 | End: 2025-01-14

## 2025-01-10 RX ORDER — CIPROFLOXACIN HYDROCHLORIDE 500 MG/1
500 TABLET, FILM COATED ORAL EVERY 12 HOURS
Refills: 0 | Status: DISCONTINUED | OUTPATIENT
Start: 2025-01-10 | End: 2025-01-14

## 2025-01-10 RX ORDER — LACTOBACILLUS ACIDOPHILUS/PECT 75 MM-100
1 CAPSULE ORAL DAILY
Refills: 0 | Status: DISCONTINUED | OUTPATIENT
Start: 2025-01-10 | End: 2025-01-14

## 2025-01-10 RX ORDER — CEFUROXIME AXETIL 250 MG
500 TABLET ORAL EVERY 12 HOURS
Refills: 0 | Status: DISCONTINUED | OUTPATIENT
Start: 2025-01-10 | End: 2025-01-10

## 2025-01-10 RX ORDER — IRON/LYS/VIT B COMP/FOLIC ACID 800-1MG/15
1 LIQUID (ML) ORAL DAILY
Refills: 0 | Status: DISCONTINUED | OUTPATIENT
Start: 2025-01-10 | End: 2025-01-14

## 2025-01-10 RX ORDER — ATORVASTATIN CALCIUM 40 MG/1
10 TABLET, FILM COATED ORAL AT BEDTIME
Refills: 0 | Status: DISCONTINUED | OUTPATIENT
Start: 2025-01-10 | End: 2025-01-14

## 2025-01-10 RX ORDER — FUROSEMIDE 20 MG
40 TABLET ORAL DAILY
Refills: 0 | Status: DISCONTINUED | OUTPATIENT
Start: 2025-01-10 | End: 2025-01-14

## 2025-01-10 RX ORDER — THIAMINE HYDROCHLORIDE 100 MG/ML
100 INJECTION, SOLUTION INTRAMUSCULAR; INTRAVENOUS DAILY
Refills: 0 | Status: DISCONTINUED | OUTPATIENT
Start: 2025-01-10 | End: 2025-01-14

## 2025-01-10 RX ORDER — SPIRONOLACTONE 50 MG/1
25 TABLET ORAL DAILY
Refills: 0 | Status: DISCONTINUED | OUTPATIENT
Start: 2025-01-10 | End: 2025-01-14

## 2025-01-10 RX ADMIN — RIVAROXABAN 20 MILLIGRAM(S): 2.5 TABLET, FILM COATED ORAL at 19:03

## 2025-01-10 RX ADMIN — DILTIAZEM HYDROCHLORIDE 120 MILLIGRAM(S): 300 CAPSULE, COATED, EXTENDED RELEASE ORAL at 11:51

## 2025-01-10 RX ADMIN — AMIODARONE HYDROCHLORIDE 200 MILLIGRAM(S): 200 TABLET ORAL at 11:50

## 2025-01-10 RX ADMIN — EZETIMIBE 10 MILLIGRAM(S): 10 TABLET ORAL at 11:51

## 2025-01-10 RX ADMIN — MONTELUKAST SODIUM 10 MILLIGRAM(S): 10 TABLET, FILM COATED ORAL at 22:28

## 2025-01-10 RX ADMIN — THIAMINE HYDROCHLORIDE 100 MILLIGRAM(S): 100 INJECTION, SOLUTION INTRAMUSCULAR; INTRAVENOUS at 11:50

## 2025-01-10 RX ADMIN — CIPROFLOXACIN HYDROCHLORIDE 500 MILLIGRAM(S): 500 TABLET, FILM COATED ORAL at 22:28

## 2025-01-10 RX ADMIN — Medication 1 TABLET(S): at 12:20

## 2025-01-10 RX ADMIN — CYANOCOBALAMIN 1000 MICROGRAM(S): 1000 INJECTION, SOLUTION INTRAMUSCULAR; SUBCUTANEOUS at 11:50

## 2025-01-10 RX ADMIN — Medication 2 MILLIGRAM(S): at 22:28

## 2025-01-10 RX ADMIN — METRONIDAZOLE 100 MILLIGRAM(S): 250 TABLET ORAL at 06:21

## 2025-01-10 RX ADMIN — NYSTATIN TOPICAL POWDER 1 APPLICATION(S): 100000 POWDER TOPICAL at 12:20

## 2025-01-10 RX ADMIN — GABAPENTIN 800 MILLIGRAM(S): 300 CAPSULE ORAL at 16:00

## 2025-01-10 RX ADMIN — NYSTATIN TOPICAL POWDER 1 APPLICATION(S): 100000 POWDER TOPICAL at 22:29

## 2025-01-10 RX ADMIN — Medication 1 TABLET(S): at 11:50

## 2025-01-10 RX ADMIN — Medication 25 MILLIGRAM(S): at 11:49

## 2025-01-10 RX ADMIN — SPIRONOLACTONE 25 MILLIGRAM(S): 50 TABLET ORAL at 11:50

## 2025-01-10 RX ADMIN — Medication 1 MILLIGRAM(S): at 11:51

## 2025-01-10 RX ADMIN — VANCOMYCIN HYDROCHLORIDE 250 MILLIGRAM(S): 5 INJECTION, POWDER, LYOPHILIZED, FOR SOLUTION INTRAVENOUS at 10:30

## 2025-01-10 RX ADMIN — ATORVASTATIN CALCIUM 10 MILLIGRAM(S): 40 TABLET, FILM COATED ORAL at 22:28

## 2025-01-10 RX ADMIN — Medication 2 MILLIGRAM(S): at 06:21

## 2025-01-10 RX ADMIN — CLOPIDOGREL BISULFATE 75 MILLIGRAM(S): 75 TABLET, FILM COATED ORAL at 11:50

## 2025-01-10 NOTE — PROGRESS NOTE ADULT - SUBJECTIVE AND OBJECTIVE BOX
SURGERY DAILY PROGRESS NOTE:     Subjective:  Patient seen and examined at bedside during am rounds. AVSS. Denies any fevers, chills, n/v/d, chest pain or shortness of breath    Objective:    MEDICATIONS  (STANDING):  aMIOdarone    Tablet 200 milliGRAM(s) Oral daily  cefepime  Injectable. 2000 milliGRAM(s) IV Push every 12 hours  clopidogrel Tablet 75 milliGRAM(s) Oral daily  collagenase Ointment 1 Application(s) Topical once  cyanocobalamin Injectable 1000 MICROGram(s) SubCutaneous daily  diltiazem    milliGRAM(s) Oral daily  ezetimibe 10 milliGRAM(s) Oral daily  folic acid Injectable 1 milliGRAM(s) IV Push daily  gabapentin 800 milliGRAM(s) Oral three times a day  influenza  Vaccine (HIGH DOSE) 0.5 milliLiter(s) IntraMuscular once  metoprolol succinate ER 25 milliGRAM(s) Oral daily  metroNIDAZOLE  IVPB 500 milliGRAM(s) IV Intermittent every 8 hours  nystatin Cream 1 Application(s) Topical every 12 hours  vancomycin  IVPB 750 milliGRAM(s) IV Intermittent every 12 hours    MEDICATIONS  (PRN):  acetaminophen     Tablet .. 650 milliGRAM(s) Oral every 6 hours PRN Mild Pain (1 - 3)  morphine  - Injectable 2 milliGRAM(s) IV Push every 6 hours PRN Severe Pain (7 - 10)  ondansetron   Disintegrating Tablet 4 milliGRAM(s) Oral every 6 hours PRN Nausea and/or Vomiting  oxyCODONE    IR 10 milliGRAM(s) Oral every 8 hours PRN Severe Pain (7 - 10)  zolpidem 10 milliGRAM(s) Oral at bedtime PRN Insomnia      Vital Signs Last 24 Hrs  T(C): 36.7 (2025 23:17), Max: 36.9 (2025 07:29)  T(F): 98.1 (2025 23:17), Max: 98.5 (2025 07:29)  HR: 61 (10 Barron 2025 00:16) (61 - 66)  BP: 120/42 (10 Barron 2025 00:16) (118/42 - 143/47)  BP(mean): --  RR: 18 (10 Barron 2025 00:16) (18 - 19)  SpO2: 94% (10 Barron 2025 00:16) (94% - 99%)    Parameters below as of 10 Barron 2025 00:16  Patient On (Oxygen Delivery Method): room air          PHYSICAL EXAM   Gen: well-appearing, in no acute distress  CV: pulse regularly present   Resp: airway patent, non-labored breathing  Abd: soft, NTND; no rebound or guarding. R groin WV in situ   Ext. no cyanosis or edema      I&O's Detail    2025 07:01  -  2025 07:00  --------------------------------------------------------  IN:    IV PiggyBack: 550 mL    Lactated Ringers: 600 mL    Oral Fluid: 50 mL    Other (mL): 400 mL  Total IN: 1600 mL    OUT:    VAC (Vacuum Assisted Closure) System (mL): 50 mL    Voided (mL): 2 mL  Total OUT: 52 mL    Total NET: 1548 mL          Daily     Daily Weight in k.2 (2025 06:25)    LABS:                        8.2    10.19 )-----------( 286      ( 2025 06:50 )             28.0     01-09    132[L]  |  103  |  19  ----------------------------<  134[H]  4.7   |  25  |  0.69    Ca    9.2      2025 06:50  Phos  3.5     01-09  Mg     2.0     01-09      PT/INR - ( 2025 05:25 )   PT: 13.7 sec;   INR: 1.16 ratio           Urinalysis Basic - ( 2025 06:50 )    Color: x / Appearance: x / SG: x / pH: x  Gluc: 134 mg/dL / Ketone: x  / Bili: x / Urobili: x   Blood: x / Protein: x / Nitrite: x   Leuk Esterase: x / RBC: x / WBC x   Sq Epi: x / Non Sq Epi: x / Bacteria: x

## 2025-01-10 NOTE — PROGRESS NOTE ADULT - ASSESSMENT
74-year-old Female with h/o DM type 2, TAVR 8/2024 on Plavix , A-fib on Xarelto, CHF, HLD, PVD, b/l LE venous insufficiency, bilateral CFA stenoses s/p Rt SFA endarterectomy on 12/17/24 was admitted on 1/3 with purulent drainage at the R groin surgical site. She reported subjective fevers. She was seen recently at [facility] and was treated with oral abx and local wound care,  but symptoms have not improved. In ER she received clindamycin IV.    #Right inguinal cellulitis/ postsurgical infection s/p right SFA endarterectomy ?graft in place?  #Right inguinal collection, likely seroma   #PVD  #Allergy to PCN  -right inguinal erythema improved  -surgical c/s noted   -on vancomycin 750 mg IV q12h, cefepime 2 gm IV q12h and metronidazole 500 mg IV q8h # 7  -tolerating abx well so far; no side effects noted  -vancomycin trough level is therapeutic  -monitor closely in ej of PCN allergy history  -surgical evaluation --> s/p wound washout and VAC  -all cultures are negative and no purulence noted during surgery   -local wound care  -change abx to ceftin 500 mg PO q12h for 10 more days  -f/u cultures  -monitor temps  -f/u CBC  -supportive care  2. Other issues:   -care per medicine    d/w Dr. Osuna and Dr. Manjarrez     Clinical team may change from intravenous to oral antibiotics when the following criteria are met:   1. Patient is clinically improving/stable       a)	Improved signs and symptoms of infection from initial presentation       b)	Afebrile for 24 hours       c)	Leukocytosis trending towards normal range   2. Patient is tolerating oral intake   3. Initial/repeat blood cultures are negative OR do not need to wait for preliminary blood cultures to result    When above criteria met OR on date, may change iv antibiotics to PO ceftin as above

## 2025-01-10 NOTE — PROGRESS NOTE ADULT - ASSESSMENT
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     POD2 s/p right groin excisional debridement, drainage of seroma and wound vac placement    PLAN  Maintain wound vac  ID consulted; appreciated recs  AC on hold, will restart when safe  Podiatry consult : Dressing changes of foot wounds q48hrs  Resumed home meds    Will start planning for discharge after next vac change: ID outpt recs, home wound vac and vns, outpt podiatry recommendations    Plan discussed with vascular surgery attending

## 2025-01-10 NOTE — PROGRESS NOTE ADULT - SUBJECTIVE AND OBJECTIVE BOX
1/10/2025: Patient was seen by the podiatry team. Pt was resting comfortably and eating bedside. No acute distress. No new complaints       PMH: Diabetes mellitus type II, controlled    Atrial fibrillation    Congestive heart failure    Dyspnea    Morbid obesity with BMI of 40.0-44.9, adult    Neuropathy    Peripheral venous insufficiency    Thyroid nodule    HTN (hypertension)    Cellulitis    At risk for sleep apnea      PSH:History of esophagogastroduodenoscopy (EGD)    H/O colonoscopy    Other complications of gastric band procedure    S/P left knee arthroscopy    Status post medial meniscus repair    History of cholecystectomy    S/P cataract surgery        Allergies:pregabalin (Unknown)  PC Pen VK (Rash)  latex (Unknown)  penicillin (Hives; Urticaria)      Labs:                         7.6    13.42 )-----------( 352      ( 10 Barron 2025 06:49 )             26.2                   01-10    134[L]  |  103  |  31[H]  ----------------------------<  127[H]  4.2   |  27  |  0.93    Ca    9.1      10 Barron 2025 06:49  Phos  3.5     01-09  Mg     2.0     01-09              Vital Signs Last 24 Hrs  T(C): 36.7 (09 Jan 2025 23:17), Max: 36.7 (09 Jan 2025 15:44)  T(F): 98.1 (09 Jan 2025 23:17), Max: 98.1 (09 Jan 2025 15:44)  HR: 65 (10 Barron 2025 08:00) (61 - 66)  BP: 150/53 (10 Barron 2025 08:00) (118/42 - 150/53)  BP(mean): --  RR: 18 (10 Barron 2025 08:00) (18 - 19)  SpO2: 95% (10 Barron 2025 08:00) (94% - 96%)    Parameters below as of 10 Barron 2025 08:00  Patient On (Oxygen Delivery Method): room air            REVIEW OF SYSTEMS:    CONSTITUTIONAL: No weakness, fevers or chills  EYES: No visual changes  RESPIRATORY: No cough, wheezing; No shortness of breath  CARDIOVASCULAR: No chest pain or palpitations  GASTROINTESTINAL: No abdominal or epigastric pain. No nausea, vomiting; No diarrhea or constipation.   GENITOURINARY: No dysuria, frequency or hematuria  NEUROLOGICAL: No numbness or weakness  SKIN: See physical examination.  All other review of systems is negative unless indicated above    Physical Exam:   Constitutional: NAD, alert;  Lower Extremity Focus  Derm:  Skin warm, dry and supple bilateral.    Ulceration Right heel wound, partial thickness in nature, wound base granular wound size (approx 1.5 cm X 0.5cm X 0.1cm) and surrounding predominant hyperkeratotic tissue, no stephen-wound erythema/edema, no pus/purulence, no crepitus/fluctuance, no tracking/tunneling, no probe to bone.  Partial thickness ulceration noted to posterior medial Left lower midleg, some serous drainage (improving), superficial wound bed, no malodor,  no crepitus/fluctuance, no tracking/tunneling, no PTB.   Vascular: Dorsalis Pedis and Posterior Tibial pulses weakly palpable  Neuro: Protective sensation intact to the level of the digits bilateral.  MSK: Muscle strength 5/5 all major muscle groups bilateral. Pt is minimally ambulatory

## 2025-01-10 NOTE — PROGRESS NOTE ADULT - ASSESSMENT
A: 75 y/o Female seen for the following:   -Rt heel partial thickness pressure wound, stable without acute SOI  -Lt posterior lower leg partial thickness venous stasis wound, stable without acute SOI  -BLE venous stasis  -PVD    P:   Chart reviewed and Patient evaluated  Wound to the posterior Left lower mid leg, with some serous drainage, no malodor, superficial in nature, stable without SOI. Rt heel stage 2 pressure ulcer; stable without any SOI. Wounds improved compared to previous admission.  Applied betadine soaked gauze and Allevyn pad to Left LE wound and Right heel  WC: Santyl with dry sterile dressing to Rt heel. Santyl and Compressive dressings applied to Left Lower Extremity  Weight-Bearing-As-Tolerated to Bilateral Lower Extremity  Offload bilateral heels with two pillows under bilateral calfs or total CAIR boots to prevent further soft tissue damage as pt is has been predominately bedbound or sitting on a chair while placing pressure to heels.    No acute podiatric intervention warranted at this time. Continue with local wound care  Pt to follow up at [facility] with Dr. Stovall for continued wound care treatment.   All additional care per vascular and Med appreciated  Patient demonstrated verbal understanding of all interventions and tolerated interventions well without any complications  Podiatry will follow peripherally while in house      Case D/W with attending Dr. Stovall    Wound care instructions for the Rt heel and Lt lower leg to be changed every other day:  - Please apply santyl to the Right heel and Left lower leg  - Cover with gauze and fernando or kerlix, secure with tape.  Thank you

## 2025-01-10 NOTE — PROGRESS NOTE ADULT - SUBJECTIVE AND OBJECTIVE BOX
HOSPITALIST ATTENDING PROGRESS NOTE    Chart and meds reviewed.  Patient seen and examined.    CC: surgical site infection    Subjective: Wound vac on. Tissue cx initially NG but now showing enterococcus. Abx changed to cipro, will need to take po for likely a month per ID.     All other systems reviewed and found to be negative with the exception of what has been described above.    MEDICATIONS  (STANDING):  aMIOdarone    Tablet 200 milliGRAM(s) Oral daily  atorvastatin 10 milliGRAM(s) Oral at bedtime  ciprofloxacin     Tablet 500 milliGRAM(s) Oral every 12 hours  clopidogrel Tablet 75 milliGRAM(s) Oral daily  collagenase Ointment 1 Application(s) Topical once  diltiazem    milliGRAM(s) Oral daily  ezetimibe 10 milliGRAM(s) Oral daily  folic acid Injectable 1 milliGRAM(s) IV Push daily  furosemide    Tablet 40 milliGRAM(s) Oral daily  gabapentin 800 milliGRAM(s) Oral three times a day  influenza  Vaccine (HIGH DOSE) 0.5 milliLiter(s) IntraMuscular once  lactobacillus acidophilus 1 Tablet(s) Oral daily  metoprolol succinate ER 25 milliGRAM(s) Oral daily  montelukast 10 milliGRAM(s) Oral at bedtime  multivitamin/minerals 1 Tablet(s) Oral daily  nystatin Cream 1 Application(s) Topical every 12 hours  spironolactone 25 milliGRAM(s) Oral daily  thiamine 100 milliGRAM(s) Oral daily    MEDICATIONS  (PRN):  acetaminophen     Tablet .. 650 milliGRAM(s) Oral every 6 hours PRN Mild Pain (1 - 3)  morphine  - Injectable 2 milliGRAM(s) IV Push every 6 hours PRN Severe Pain (7 - 10)  ondansetron   Disintegrating Tablet 4 milliGRAM(s) Oral every 6 hours PRN Nausea and/or Vomiting  oxyCODONE    IR 10 milliGRAM(s) Oral every 8 hours PRN Severe Pain (7 - 10)  zolpidem 10 milliGRAM(s) Oral at bedtime PRN Insomnia      VITALS:  T(F): 98.1 (01-09-25 @ 23:17), Max: 98.1 (01-09-25 @ 23:17)  HR: 65 (01-10-25 @ 08:00) (61 - 66)  BP: 150/53 (01-10-25 @ 08:00) (118/42 - 150/53)  RR: 18 (01-10-25 @ 08:00) (18 - 18)  SpO2: 95% (01-10-25 @ 08:00) (94% - 96%)  Wt(kg): --    I&O's Summary      CAPILLARY BLOOD GLUCOSE          PHYSICAL EXAM:  Gen: NAD  HEENT:  pupils equal and reactive, EOMI, no oropharyngeal lesions, erythema, exudates, oral thrush  NECK:   supple, no carotid bruits, no palpable lymph nodes, no thyromegaly  CV:  +S1, +S2, regular, no murmurs or rubs  RESP:   lungs clear to auscultation bilaterally, no wheezing, rales, rhonchi, good air entry bilaterally  BREAST:  not examined  GI:  abdomen soft, non-tender, non-distended, normal BS, no bruits, no abdominal masses, no palpable masses  RECTAL:  not examined  :  not examined  MSK:   normal muscle tone, no atrophy, no rigidity, no contractions  EXT:  no clubbing, no cyanosis, no edema, no calf pain, swelling or erythema, + wound vac  VASCULAR:  pulses equal and symmetric in the upper and lower extremities  NEURO:  AAOX3, no focal neurological deficits, follows all commands, able to move extremities spontaneously  SKIN:  no ulcers, lesions or rashes    LABS:                            7.6    13.42 )-----------( 352      ( 10 Barron 2025 06:49 )             26.2     01-10    134[L]  |  103  |  31[H]  ----------------------------<  127[H]  4.2   |  27  |  0.93    Ca    9.1      10 Barron 2025 06:49  Phos  3.5     01-09  Mg     2.0     01-09              Urinalysis Basic - ( 10 Barron 2025 06:49 )    Color: x / Appearance: x / SG: x / pH: x  Gluc: 127 mg/dL / Ketone: x  / Bili: x / Urobili: x   Blood: x / Protein: x / Nitrite: x   Leuk Esterase: x / RBC: x / WBC x   Sq Epi: x / Non Sq Epi: x / Bacteria: x      CULTURES:  Tissue cx: enterococcus faecalis  BCx NG    Additional results/Imaging, I have personally reviewed:  CT a/p w iv contrast 12/31/24: 10 cm in greatest dimension fluid collection superficial to the right common femoral vessels. Given the history this most likely represents an abscess. Subacute hematoma, seroma might also appear this way.   Biliary ductal dilation which has increased slightly. While most likely compensatory and nonobstructive in this patient status post cholecystectomy, obstruction remains possible though less likely.

## 2025-01-10 NOTE — PROGRESS NOTE ADULT - REASON FOR ADMISSION
groin collection
surgical site infection
groin collection
surgical site infection
groin collection

## 2025-01-10 NOTE — PROGRESS NOTE ADULT - ASSESSMENT
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  Medicine consulted for comanagement.    #R groin surgical site infection  -patient under vasc service followed by  ID   -BCX: NGTD  -tissue cx w enterococcus  -s/p  vanco cefepime flagyl  -changed to cipro 500 po q12, will need to take for likely a month, discussed Dr. Hoyos today  -s/p wound debridement w penrose drain and wound vac placement on 1/8/25    #chronic HFpEF  -TTE last month:  LV cavity moderately dilated, normal wall thickness, normal LVEF, severe (grade 3) LV diastolic dysfunction, severe LA dilatation, mod to moderate RA dilatation, normal RV size and function.   -no acute signs of HF at this time  -resume po lasix -and spironolactone  -IVF stopped  -c/w metoprolol ER with hold parameters   - Farxiga on hold resume at  discharge   -Monitor weights    #Hx of Afib s/p ablation  -c/w Cardizem CD and amiodarone.   -xarelto held, will resume when ok w vascular    #HLD  -on home Zetia    # Anemia,  iron deficiency, B12, folate def   and chronic Dz   -baseline 7s to 8s. normocytic.  -no signs or sx of bleeding at this time  -f/u iron studies:  mixed chronic  Dz and iron def   -B12/folate reviewed past admission, both low   -started  B12 SQ, Folate IV, switch to PO after 5 doses   -s/p 3 doses iv Fe  -trend CBC     DVT ppx- SCDs    Will follow. Anticipate remain in house thru weekend per vascular recs, vascular working w CM to get wound vac set up for home. Will need to be on cipro po x 1 month.

## 2025-01-10 NOTE — PROGRESS NOTE ADULT - SUBJECTIVE AND OBJECTIVE BOX
Date of service: 01-10-25 @ 11:25    Lying in bed in NAD  Has wound VAC in place - serous drainage in container  No fever  Denies pain    ROS: no fever or chills; denies dizziness, no HA, no SOB or cough, no abdominal pain, no diarrhea or constipation; no dysuria, no legs pain, no rashes    MEDICATIONS  (STANDING):  aMIOdarone    Tablet 200 milliGRAM(s) Oral daily  atorvastatin 10 milliGRAM(s) Oral at bedtime  cefuroxime   Tablet 500 milliGRAM(s) Oral every 12 hours  clopidogrel Tablet 75 milliGRAM(s) Oral daily  collagenase Ointment 1 Application(s) Topical once  cyanocobalamin Injectable 1000 MICROGram(s) SubCutaneous daily  diltiazem    milliGRAM(s) Oral daily  ezetimibe 10 milliGRAM(s) Oral daily  folic acid Injectable 1 milliGRAM(s) IV Push daily  furosemide    Tablet 40 milliGRAM(s) Oral daily  gabapentin 800 milliGRAM(s) Oral three times a day  influenza  Vaccine (HIGH DOSE) 0.5 milliLiter(s) IntraMuscular once  lactobacillus acidophilus 1 Tablet(s) Oral daily  metoprolol succinate ER 25 milliGRAM(s) Oral daily  montelukast 10 milliGRAM(s) Oral at bedtime  multivitamin/minerals 1 Tablet(s) Oral daily  nystatin Cream 1 Application(s) Topical every 12 hours  spironolactone 25 milliGRAM(s) Oral daily  thiamine 100 milliGRAM(s) Oral daily    Vital Signs Last 24 Hrs  T(C): 36.7 (09 Jan 2025 23:17), Max: 36.7 (09 Jan 2025 15:44)  T(F): 98.1 (09 Jan 2025 23:17), Max: 98.1 (09 Jan 2025 15:44)  HR: 65 (10 Barron 2025 08:00) (61 - 66)  BP: 150/53 (10 Barron 2025 08:00) (118/42 - 150/53)  BP(mean): --  RR: 18 (10 Barron 2025 08:00) (18 - 19)  SpO2: 95% (10 Barron 2025 08:00) (94% - 96%)    Parameters below as of 10 Barron 2025 08:00  Patient On (Oxygen Delivery Method): room air     Physical exam:    Constitutional:  No acute distress  HEENT: NC/AT, EOMI, PERRLA, conjunctivae clear; ears and nose atraumatic; pharynx benign  Neck: supple; thyroid not palpable  Back: no tenderness  Respiratory: respiratory effort normal; clear to auscultation  Cardiovascular: S1S2 regular, no murmurs  Abdomen: soft, not tender, not distended, positive BS; no liver or spleen organomegaly  Genitourinary: no suprapubic tenderness  Lymphatic: no LN palpable  Musculoskeletal: no muscle tenderness, no joint swelling or tenderness  Extremities: no pedal edema  Right inguinal wound with surrounding erythema - improving; VAC  no drainage noted at this time  Neurological/ Psychiatric: AxOx3, judgement and insight normal; moving all extremities  Skin: no rashes; no palpable lesions    Labs: reviewed                        7.6    8.78  )-----------( 284      ( 08 Jan 2025 05:25 )             26.3     01-08    135  |  102  |  21  ----------------------------<  98  4.4   |  30  |  0.89    Ca    8.7      08 Jan 2025 05:25  Phos  3.6     01-08  Mg     1.8     01-08    Vancomycin Level, Trough: 10.6 ug/mL (01-06 @ 20:11)  Ferritin: 27 ng/mL (01-05-25 @ 07:39)                        7.4    6.26  )-----------( 241      ( 06 Jan 2025 07:03 )             26.0     01-06    138  |  105  |  16  ----------------------------<  103[H]  4.1   |  31  |  0.80    Ca    8.9      06 Jan 2025 07:03  Phos  4.1     01-06  Mg     1.8     01-06    Ferritin: 27 ng/mL (01-05-25 @ 07:39)                          7.1    7.39  )-----------( 253      ( 04 Jan 2025 07:16 )             24.4     01-04    137  |  107  |  19  ----------------------------<  107[H]  4.0   |  26  |  0.75    Ca    8.9      04 Jan 2025 07:16  Phos  4.1     01-04  Mg     1.7     01-04    TPro  7.5  /  Alb  2.9[L]  /  TBili  0.2  /  DBili  x   /  AST  8[L]  /  ALT  12  /  AlkPhos  99  01-03     LIVER FUNCTIONS - ( 03 Jan 2025 13:48 )  Alb: 2.9 g/dL / Pro: 7.5 gm/dL / ALK PHOS: 99 U/L / ALT: 12 U/L / AST: 8 U/L / GGT: x           Culture - Fungal, Other (collected 08 Jan 2025 17:45)  Source: .Other  Preliminary Report (10 Barron 2025 09:30):    Testing in progress    Culture - Wound Aerobic/Anaerobic (collected 08 Jan 2025 17:45)  Source: .Surgical Swab  Preliminary Report (10 Barron 2025 09:11):    No growth to date.    Culture - Fungal, Other (collected 08 Jan 2025 17:45)  Source: .Other  Preliminary Report (10 Barron 2025 09:30):    Testing in progress    Culture - Wound Aerobic/Anaerobic (collected 08 Jan 2025 17:45)  Source: .Surgical Swab  Preliminary Report (10 Barron 2025 08:55):    No growth to date.    Culture - Fungal, Tissue (collected 08 Jan 2025 17:45)  Source: Tissue  Preliminary Report (10 Barron 2025 08:24):    No growth    Culture - Acid Fast - Tissue w/Smear (collected 08 Jan 2025 17:45)  Source: Tissue    Culture - Tissue with Gram Stain (collected 08 Jan 2025 17:45)  Source: Tissue  Gram Stain (09 Jan 2025 05:59):    No polymorphonuclear cells seen per low power field    No organisms seen per oil power field    Culture - Blood (collected 03 Jan 2025 13:48)  Source: .Blood BLOOD  Final Report (08 Jan 2025 19:01):    No growth at 5 days    Culture - Blood (collected 03 Jan 2025 13:48)  Source: .Blood BLOOD  Final Report (08 Jan 2025 19:01):    No growth at 5 days    Radiology: all available radiological tests reviewed    < from: CT Abdomen and Pelvis w/ IV Cont (12.31.24 @ 04:55) >  10 cm in greatest dimension fluid collection superficial to the right common femoral vessels. Given the history this most likely represents an abscess. Subacute hematoma, seroma might also appear this way.  Biliary ductal dilation which has increased slightly. While most likely compensatory and nonobstructive in this patient status post cholecystectomy, obstruction remains possible though less likely.  < end of copied text >    Advanced directives addressed: full resuscitation

## 2025-01-11 LAB
-  AMPICILLIN: SIGNIFICANT CHANGE UP
-  VANCOMYCIN: SIGNIFICANT CHANGE UP
ALBUMIN SERPL ELPH-MCNC: 2.5 G/DL — LOW (ref 3.3–5)
ALP SERPL-CCNC: 72 U/L — SIGNIFICANT CHANGE UP (ref 40–120)
ALT FLD-CCNC: 11 U/L — LOW (ref 12–78)
ANION GAP SERPL CALC-SCNC: 5 MMOL/L — SIGNIFICANT CHANGE UP (ref 5–17)
AST SERPL-CCNC: 11 U/L — LOW (ref 15–37)
BASOPHILS # BLD AUTO: 0.14 K/UL — SIGNIFICANT CHANGE UP (ref 0–0.2)
BASOPHILS NFR BLD AUTO: 1.3 % — SIGNIFICANT CHANGE UP (ref 0–2)
BILIRUB SERPL-MCNC: 0.2 MG/DL — SIGNIFICANT CHANGE UP (ref 0.2–1.2)
BUN SERPL-MCNC: 31 MG/DL — HIGH (ref 7–23)
CALCIUM SERPL-MCNC: 8.8 MG/DL — SIGNIFICANT CHANGE UP (ref 8.5–10.1)
CHLORIDE SERPL-SCNC: 106 MMOL/L — SIGNIFICANT CHANGE UP (ref 96–108)
CO2 SERPL-SCNC: 27 MMOL/L — SIGNIFICANT CHANGE UP (ref 22–31)
CREAT SERPL-MCNC: 0.75 MG/DL — SIGNIFICANT CHANGE UP (ref 0.5–1.3)
EGFR: 83 ML/MIN/1.73M2 — SIGNIFICANT CHANGE UP
EOSINOPHIL # BLD AUTO: 0.6 K/UL — HIGH (ref 0–0.5)
EOSINOPHIL NFR BLD AUTO: 5.8 % — SIGNIFICANT CHANGE UP (ref 0–6)
GLUCOSE SERPL-MCNC: 116 MG/DL — HIGH (ref 70–99)
HCT VFR BLD CALC: 27.1 % — LOW (ref 34.5–45)
HGB BLD-MCNC: 7.7 G/DL — LOW (ref 11.5–15.5)
IMM GRANULOCYTES NFR BLD AUTO: 1.3 % — HIGH (ref 0–0.9)
LYMPHOCYTES # BLD AUTO: 2.08 K/UL — SIGNIFICANT CHANGE UP (ref 1–3.3)
LYMPHOCYTES # BLD AUTO: 20 % — SIGNIFICANT CHANGE UP (ref 13–44)
MAGNESIUM SERPL-MCNC: 1.8 MG/DL — SIGNIFICANT CHANGE UP (ref 1.6–2.6)
MCHC RBC-ENTMCNC: 24.6 PG — LOW (ref 27–34)
MCHC RBC-ENTMCNC: 28.4 G/DL — LOW (ref 32–36)
MCV RBC AUTO: 86.6 FL — SIGNIFICANT CHANGE UP (ref 80–100)
METHOD TYPE: SIGNIFICANT CHANGE UP
MONOCYTES # BLD AUTO: 1.34 K/UL — HIGH (ref 0–0.9)
MONOCYTES NFR BLD AUTO: 12.9 % — SIGNIFICANT CHANGE UP (ref 2–14)
NEUTROPHILS # BLD AUTO: 6.11 K/UL — SIGNIFICANT CHANGE UP (ref 1.8–7.4)
NEUTROPHILS NFR BLD AUTO: 58.7 % — SIGNIFICANT CHANGE UP (ref 43–77)
PHOSPHATE SERPL-MCNC: 3.7 MG/DL — SIGNIFICANT CHANGE UP (ref 2.5–4.5)
PLATELET # BLD AUTO: 337 K/UL — SIGNIFICANT CHANGE UP (ref 150–400)
POTASSIUM SERPL-MCNC: 4.4 MMOL/L — SIGNIFICANT CHANGE UP (ref 3.5–5.3)
POTASSIUM SERPL-SCNC: 4.4 MMOL/L — SIGNIFICANT CHANGE UP (ref 3.5–5.3)
PROT SERPL-MCNC: 6.8 GM/DL — SIGNIFICANT CHANGE UP (ref 6–8.3)
RBC # BLD: 3.13 M/UL — LOW (ref 3.8–5.2)
RBC # FLD: 18 % — HIGH (ref 10.3–14.5)
SODIUM SERPL-SCNC: 138 MMOL/L — SIGNIFICANT CHANGE UP (ref 135–145)
WBC # BLD: 10.4 K/UL — SIGNIFICANT CHANGE UP (ref 3.8–10.5)
WBC # FLD AUTO: 10.4 K/UL — SIGNIFICANT CHANGE UP (ref 3.8–10.5)

## 2025-01-11 PROCEDURE — 99233 SBSQ HOSP IP/OBS HIGH 50: CPT

## 2025-01-11 RX ORDER — GUAIFENESIN 100 MG/5ML
600 SYRUP ORAL EVERY 12 HOURS
Refills: 0 | Status: DISCONTINUED | OUTPATIENT
Start: 2025-01-11 | End: 2025-01-14

## 2025-01-11 RX ADMIN — MONTELUKAST SODIUM 10 MILLIGRAM(S): 10 TABLET, FILM COATED ORAL at 22:06

## 2025-01-11 RX ADMIN — GABAPENTIN 800 MILLIGRAM(S): 300 CAPSULE ORAL at 14:45

## 2025-01-11 RX ADMIN — CLOPIDOGREL BISULFATE 75 MILLIGRAM(S): 75 TABLET, FILM COATED ORAL at 09:52

## 2025-01-11 RX ADMIN — ATORVASTATIN CALCIUM 10 MILLIGRAM(S): 40 TABLET, FILM COATED ORAL at 22:06

## 2025-01-11 RX ADMIN — Medication 2 MILLIGRAM(S): at 23:04

## 2025-01-11 RX ADMIN — THIAMINE HYDROCHLORIDE 100 MILLIGRAM(S): 100 INJECTION, SOLUTION INTRAMUSCULAR; INTRAVENOUS at 09:53

## 2025-01-11 RX ADMIN — Medication 25 MILLIGRAM(S): at 09:53

## 2025-01-11 RX ADMIN — Medication 1 TABLET(S): at 09:52

## 2025-01-11 RX ADMIN — Medication 1 MILLIGRAM(S): at 09:53

## 2025-01-11 RX ADMIN — Medication 10 MILLIGRAM(S): at 02:49

## 2025-01-11 RX ADMIN — RIVAROXABAN 20 MILLIGRAM(S): 2.5 TABLET, FILM COATED ORAL at 16:52

## 2025-01-11 RX ADMIN — AMIODARONE HYDROCHLORIDE 200 MILLIGRAM(S): 200 TABLET ORAL at 09:53

## 2025-01-11 RX ADMIN — EZETIMIBE 10 MILLIGRAM(S): 10 TABLET ORAL at 09:53

## 2025-01-11 RX ADMIN — Medication 2 MILLIGRAM(S): at 09:47

## 2025-01-11 RX ADMIN — SPIRONOLACTONE 25 MILLIGRAM(S): 50 TABLET ORAL at 09:51

## 2025-01-11 RX ADMIN — CIPROFLOXACIN HYDROCHLORIDE 500 MILLIGRAM(S): 500 TABLET, FILM COATED ORAL at 22:06

## 2025-01-11 RX ADMIN — Medication 2 MILLIGRAM(S): at 16:55

## 2025-01-11 RX ADMIN — NYSTATIN TOPICAL POWDER 1 APPLICATION(S): 100000 POWDER TOPICAL at 22:35

## 2025-01-11 RX ADMIN — Medication 1 TABLET(S): at 09:51

## 2025-01-11 RX ADMIN — NYSTATIN TOPICAL POWDER 1 APPLICATION(S): 100000 POWDER TOPICAL at 09:55

## 2025-01-11 RX ADMIN — CIPROFLOXACIN HYDROCHLORIDE 500 MILLIGRAM(S): 500 TABLET, FILM COATED ORAL at 09:54

## 2025-01-11 RX ADMIN — ACETAMINOPHEN 650 MILLIGRAM(S): 80 SOLUTION/ DROPS ORAL at 02:50

## 2025-01-11 RX ADMIN — DILTIAZEM HYDROCHLORIDE 120 MILLIGRAM(S): 300 CAPSULE, COATED, EXTENDED RELEASE ORAL at 09:52

## 2025-01-11 RX ADMIN — Medication 600 MILLIGRAM(S): at 18:24

## 2025-01-11 RX ADMIN — GABAPENTIN 800 MILLIGRAM(S): 300 CAPSULE ORAL at 22:06

## 2025-01-11 NOTE — PROGRESS NOTE ADULT - SUBJECTIVE AND OBJECTIVE BOX
SURGERY DAILY PROGRESS NOTE:     Subjective:  Patient seen and examined at bedside during am rounds. AVSS. Denies any fevers, chills, n/v/d, chest pain or shortness of breath    Objective:    MEDICATIONS  (STANDING):  aMIOdarone    Tablet 200 milliGRAM(s) Oral daily  atorvastatin 10 milliGRAM(s) Oral at bedtime  ciprofloxacin     Tablet 500 milliGRAM(s) Oral every 12 hours  clopidogrel Tablet 75 milliGRAM(s) Oral daily  collagenase Ointment 1 Application(s) Topical once  diltiazem    milliGRAM(s) Oral daily  ezetimibe 10 milliGRAM(s) Oral daily  folic acid Injectable 1 milliGRAM(s) IV Push daily  furosemide    Tablet 40 milliGRAM(s) Oral daily  gabapentin 800 milliGRAM(s) Oral three times a day  influenza  Vaccine (HIGH DOSE) 0.5 milliLiter(s) IntraMuscular once  lactobacillus acidophilus 1 Tablet(s) Oral daily  metoprolol succinate ER 25 milliGRAM(s) Oral daily  montelukast 10 milliGRAM(s) Oral at bedtime  multivitamin/minerals 1 Tablet(s) Oral daily  nystatin Cream 1 Application(s) Topical every 12 hours  rivaroxaban 20 milliGRAM(s) Oral with dinner  spironolactone 25 milliGRAM(s) Oral daily  thiamine 100 milliGRAM(s) Oral daily    MEDICATIONS  (PRN):  acetaminophen     Tablet .. 650 milliGRAM(s) Oral every 6 hours PRN Mild Pain (1 - 3)  morphine  - Injectable 2 milliGRAM(s) IV Push every 6 hours PRN Severe Pain (7 - 10)  ondansetron   Disintegrating Tablet 4 milliGRAM(s) Oral every 6 hours PRN Nausea and/or Vomiting  oxyCODONE    IR 10 milliGRAM(s) Oral every 8 hours PRN Severe Pain (7 - 10)      Vital Signs Last 24 Hrs  T(C): 36.9 (11 Jan 2025 02:14), Max: 36.9 (11 Jan 2025 02:14)  T(F): 98.4 (11 Jan 2025 02:14), Max: 98.4 (11 Jan 2025 02:14)  HR: 58 (11 Jan 2025 02:14) (52 - 66)  BP: 105/76 (11 Jan 2025 02:14) (105/76 - 150/53)  BP(mean): --  RR: 18 (11 Jan 2025 02:14) (18 - 18)  SpO2: 95% (11 Jan 2025 02:14) (95% - 98%)    Parameters below as of 11 Jan 2025 02:14  Patient On (Oxygen Delivery Method): room air          PHYSICAL EXAM   Gen: well-appearing, in no acute distress  CV: pulse regularly present   Resp: airway patent, non-labored breathing  Abd: soft, NTND; no rebound or guarding. R groin WV in situ   Ext. no cyanosis or edema      I&O's Detail    10 Barron 2025 07:01  -  11 Jan 2025 02:29  --------------------------------------------------------  IN:  Total IN: 0 mL    OUT:    Voided (mL): 200 mL  Total OUT: 200 mL    Total NET: -200 mL          Daily     Daily     LABS:                        7.6    13.42 )-----------( 352      ( 10 Barron 2025 06:49 )             26.2     01-10    134[L]  |  103  |  31[H]  ----------------------------<  127[H]  4.2   |  27  |  0.93    Ca    9.1      10 Barron 2025 06:49  Phos  3.5     01-09  Mg     2.0     01-09        Urinalysis Basic - ( 10 Barron 2025 06:49 )    Color: x / Appearance: x / SG: x / pH: x  Gluc: 127 mg/dL / Ketone: x  / Bili: x / Urobili: x   Blood: x / Protein: x / Nitrite: x   Leuk Esterase: x / RBC: x / WBC x   Sq Epi: x / Non Sq Epi: x / Bacteria: x

## 2025-01-11 NOTE — PROGRESS NOTE ADULT - SUBJECTIVE AND OBJECTIVE BOX
CC: Patient is a 74y old  Female who presents with a chief complaint of surgical site infection (10 Barron 2025 16:33)      INTERVAL EVENTS: LINDA    SUBJECTIVE / INTERVAL HPI: Patient seen and examined at bedside. Patient with no complaints this AM resting comfortably     ROS: negative unless otherwise stated above.    VITAL SIGNS:  Vital Signs Last 24 Hrs  T(C): 36.2 (11 Jan 2025 08:22), Max: 36.9 (11 Jan 2025 02:14)  T(F): 97.2 (11 Jan 2025 08:22), Max: 98.4 (11 Jan 2025 02:14)  HR: 55 (11 Jan 2025 08:22) (50 - 58)  BP: 130/54 (11 Jan 2025 08:22) (105/76 - 130/54)  BP(mean): --  RR: 17 (11 Jan 2025 08:22) (17 - 18)  SpO2: 97% (11 Jan 2025 08:22) (95% - 98%)    Parameters below as of 11 Jan 2025 08:22  Patient On (Oxygen Delivery Method): room air          01-10-25 @ 07:01  -  01-11-25 @ 07:00  --------------------------------------------------------  IN: 0 mL / OUT: 200 mL / NET: -200 mL        PHYSICAL EXAM:    General: NAD  HEENT: MMM  Neck: supple  Cardiovascular: +S1/S2; RRR  Respiratory: CTA B/L; no W/R/R  Gastrointestinal: soft, NT/ND  Extremities: WWP; no edema, clubbing or cyanosis  Vascular: 2+ radial, DP/PT pulses B/L  Neurological: AAOx3; no focal deficits    MEDICATIONS:  MEDICATIONS  (STANDING):  aMIOdarone    Tablet 200 milliGRAM(s) Oral daily  atorvastatin 10 milliGRAM(s) Oral at bedtime  ciprofloxacin     Tablet 500 milliGRAM(s) Oral every 12 hours  clopidogrel Tablet 75 milliGRAM(s) Oral daily  collagenase Ointment 1 Application(s) Topical once  diltiazem    milliGRAM(s) Oral daily  ezetimibe 10 milliGRAM(s) Oral daily  furosemide    Tablet 40 milliGRAM(s) Oral daily  gabapentin 800 milliGRAM(s) Oral three times a day  influenza  Vaccine (HIGH DOSE) 0.5 milliLiter(s) IntraMuscular once  lactobacillus acidophilus 1 Tablet(s) Oral daily  metoprolol succinate ER 25 milliGRAM(s) Oral daily  montelukast 10 milliGRAM(s) Oral at bedtime  multivitamin/minerals 1 Tablet(s) Oral daily  nystatin Cream 1 Application(s) Topical every 12 hours  rivaroxaban 20 milliGRAM(s) Oral with dinner  spironolactone 25 milliGRAM(s) Oral daily  thiamine 100 milliGRAM(s) Oral daily    MEDICATIONS  (PRN):  acetaminophen     Tablet .. 650 milliGRAM(s) Oral every 6 hours PRN Mild Pain (1 - 3)  morphine  - Injectable 2 milliGRAM(s) IV Push every 6 hours PRN Severe Pain (7 - 10)  ondansetron   Disintegrating Tablet 4 milliGRAM(s) Oral every 6 hours PRN Nausea and/or Vomiting  oxyCODONE    IR 10 milliGRAM(s) Oral every 8 hours PRN Severe Pain (7 - 10)      ALLERGIES:  Allergies    pregabalin (Unknown)  PC Pen VK (Rash)  latex (Unknown)  penicillin (Hives; Urticaria)    Intolerances        LABS:                        7.7    10.40 )-----------( 337      ( 11 Jan 2025 06:22 )             27.1     01-11    138  |  106  |  31[H]  ----------------------------<  116[H]  4.4   |  27  |  0.75    Ca    8.8      11 Jan 2025 06:22  Phos  3.7     01-11  Mg     1.8     01-11    TPro  6.8  /  Alb  2.5[L]  /  TBili  0.2  /  DBili  x   /  AST  11[L]  /  ALT  11[L]  /  AlkPhos  72  01-11      Urinalysis Basic - ( 11 Jan 2025 06:22 )    Color: x / Appearance: x / SG: x / pH: x  Gluc: 116 mg/dL / Ketone: x  / Bili: x / Urobili: x   Blood: x / Protein: x / Nitrite: x   Leuk Esterase: x / RBC: x / WBC x   Sq Epi: x / Non Sq Epi: x / Bacteria: x      CAPILLARY BLOOD GLUCOSE          RADIOLOGY & ADDITIONAL TESTS: Reviewed.

## 2025-01-11 NOTE — PROGRESS NOTE ADULT - ASSESSMENT
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  Medicine consulted for comanagement.    #R groin surgical site infection  -patient under vasc service followed by  ID   -BCX: NGTD  -tissue cx w enterococcus -- e faecalis amp S howver pt with penicillin allergy   -s/p  vanco cefepime flagyl  -changed to cipro 500 po q12, will need to take for likely a month, discussed with ID   -s/p wound debridement w penrose drain and wound vac placement on 1/8/25    #chronic HFpEF  -TTE last month:  LV cavity moderately dilated, normal wall thickness, normal LVEF, severe (grade 3) LV diastolic dysfunction, severe LA dilatation, mod to moderate RA dilatation, normal RV size and function.   -no acute signs of HF at this time  -resume po lasix -and spironolactone  -IVF stopped  -c/w metoprolol ER with hold parameters   - Farxiga on hold resume at  discharge   -Monitor weights    #Hx of Afib s/p ablation  -c/w Cardizem CD and amiodarone.   -xarelto restarted per vascular    #HLD  -on home Zetia    # Anemia,  iron deficiency, B12, folate def   and chronic Dz   -baseline 7s to 8s. normocytic.  -no signs or sx of bleeding at this time  -f/u iron studies:  mixed chronic  Dz and iron def   -B12/folate reviewed past admission, both low   -started  B12 SQ, Folate IV, switch to PO after 5 doses   -s/p 3 doses iv Fe  -trend CBC     DVT ppx- SCDs    Will follow. Anticipate remain in house thru weekend per vascular recs, vascular working w CM to get wound vac set up for home. Will need to be on cipro po x 1 month.

## 2025-01-12 LAB
ALBUMIN SERPL ELPH-MCNC: 2.4 G/DL — LOW (ref 3.3–5)
ALP SERPL-CCNC: 69 U/L — SIGNIFICANT CHANGE UP (ref 40–120)
ALT FLD-CCNC: 9 U/L — LOW (ref 12–78)
ANION GAP SERPL CALC-SCNC: 2 MMOL/L — LOW (ref 5–17)
AST SERPL-CCNC: 9 U/L — LOW (ref 15–37)
BASOPHILS # BLD AUTO: 0.1 K/UL — SIGNIFICANT CHANGE UP (ref 0–0.2)
BASOPHILS NFR BLD AUTO: 1.1 % — SIGNIFICANT CHANGE UP (ref 0–2)
BILIRUB SERPL-MCNC: 0.3 MG/DL — SIGNIFICANT CHANGE UP (ref 0.2–1.2)
BUN SERPL-MCNC: 28 MG/DL — HIGH (ref 7–23)
CALCIUM SERPL-MCNC: 8.7 MG/DL — SIGNIFICANT CHANGE UP (ref 8.5–10.1)
CHLORIDE SERPL-SCNC: 106 MMOL/L — SIGNIFICANT CHANGE UP (ref 96–108)
CO2 SERPL-SCNC: 28 MMOL/L — SIGNIFICANT CHANGE UP (ref 22–31)
CREAT SERPL-MCNC: 0.71 MG/DL — SIGNIFICANT CHANGE UP (ref 0.5–1.3)
EGFR: 89 ML/MIN/1.73M2 — SIGNIFICANT CHANGE UP
EOSINOPHIL # BLD AUTO: 0.76 K/UL — HIGH (ref 0–0.5)
EOSINOPHIL NFR BLD AUTO: 8.5 % — HIGH (ref 0–6)
GLUCOSE SERPL-MCNC: 104 MG/DL — HIGH (ref 70–99)
HCT VFR BLD CALC: 27.1 % — LOW (ref 34.5–45)
HGB BLD-MCNC: 7.8 G/DL — LOW (ref 11.5–15.5)
IMM GRANULOCYTES NFR BLD AUTO: 0.8 % — SIGNIFICANT CHANGE UP (ref 0–0.9)
LYMPHOCYTES # BLD AUTO: 1.54 K/UL — SIGNIFICANT CHANGE UP (ref 1–3.3)
LYMPHOCYTES # BLD AUTO: 17.1 % — SIGNIFICANT CHANGE UP (ref 13–44)
MAGNESIUM SERPL-MCNC: 1.8 MG/DL — SIGNIFICANT CHANGE UP (ref 1.6–2.6)
MCHC RBC-ENTMCNC: 24.9 PG — LOW (ref 27–34)
MCHC RBC-ENTMCNC: 28.8 G/DL — LOW (ref 32–36)
MCV RBC AUTO: 86.6 FL — SIGNIFICANT CHANGE UP (ref 80–100)
MONOCYTES # BLD AUTO: 1.16 K/UL — HIGH (ref 0–0.9)
MONOCYTES NFR BLD AUTO: 12.9 % — SIGNIFICANT CHANGE UP (ref 2–14)
NEUTROPHILS # BLD AUTO: 5.35 K/UL — SIGNIFICANT CHANGE UP (ref 1.8–7.4)
NEUTROPHILS NFR BLD AUTO: 59.6 % — SIGNIFICANT CHANGE UP (ref 43–77)
PHOSPHATE SERPL-MCNC: 3.9 MG/DL — SIGNIFICANT CHANGE UP (ref 2.5–4.5)
PLATELET # BLD AUTO: 343 K/UL — SIGNIFICANT CHANGE UP (ref 150–400)
POTASSIUM SERPL-MCNC: 4.5 MMOL/L — SIGNIFICANT CHANGE UP (ref 3.5–5.3)
POTASSIUM SERPL-SCNC: 4.5 MMOL/L — SIGNIFICANT CHANGE UP (ref 3.5–5.3)
PROT SERPL-MCNC: 6.6 GM/DL — SIGNIFICANT CHANGE UP (ref 6–8.3)
RBC # BLD: 3.13 M/UL — LOW (ref 3.8–5.2)
RBC # FLD: 18.6 % — HIGH (ref 10.3–14.5)
SODIUM SERPL-SCNC: 136 MMOL/L — SIGNIFICANT CHANGE UP (ref 135–145)
WBC # BLD: 8.98 K/UL — SIGNIFICANT CHANGE UP (ref 3.8–10.5)
WBC # FLD AUTO: 8.98 K/UL — SIGNIFICANT CHANGE UP (ref 3.8–10.5)

## 2025-01-12 PROCEDURE — 99232 SBSQ HOSP IP/OBS MODERATE 35: CPT

## 2025-01-12 RX ADMIN — AMIODARONE HYDROCHLORIDE 200 MILLIGRAM(S): 200 TABLET ORAL at 10:07

## 2025-01-12 RX ADMIN — GABAPENTIN 800 MILLIGRAM(S): 300 CAPSULE ORAL at 22:50

## 2025-01-12 RX ADMIN — EZETIMIBE 10 MILLIGRAM(S): 10 TABLET ORAL at 10:07

## 2025-01-12 RX ADMIN — Medication 2 MILLIGRAM(S): at 23:10

## 2025-01-12 RX ADMIN — NYSTATIN TOPICAL POWDER 1 APPLICATION(S): 100000 POWDER TOPICAL at 22:57

## 2025-01-12 RX ADMIN — Medication 40 MILLIGRAM(S): at 06:30

## 2025-01-12 RX ADMIN — CIPROFLOXACIN HYDROCHLORIDE 500 MILLIGRAM(S): 500 TABLET, FILM COATED ORAL at 22:50

## 2025-01-12 RX ADMIN — THIAMINE HYDROCHLORIDE 100 MILLIGRAM(S): 100 INJECTION, SOLUTION INTRAMUSCULAR; INTRAVENOUS at 10:07

## 2025-01-12 RX ADMIN — CLOPIDOGREL BISULFATE 75 MILLIGRAM(S): 75 TABLET, FILM COATED ORAL at 10:06

## 2025-01-12 RX ADMIN — DILTIAZEM HYDROCHLORIDE 120 MILLIGRAM(S): 300 CAPSULE, COATED, EXTENDED RELEASE ORAL at 10:07

## 2025-01-12 RX ADMIN — Medication 2 MILLIGRAM(S): at 22:51

## 2025-01-12 RX ADMIN — NYSTATIN TOPICAL POWDER 1 APPLICATION(S): 100000 POWDER TOPICAL at 10:09

## 2025-01-12 RX ADMIN — SPIRONOLACTONE 25 MILLIGRAM(S): 50 TABLET ORAL at 10:08

## 2025-01-12 RX ADMIN — Medication 1 TABLET(S): at 10:06

## 2025-01-12 RX ADMIN — CIPROFLOXACIN HYDROCHLORIDE 500 MILLIGRAM(S): 500 TABLET, FILM COATED ORAL at 10:06

## 2025-01-12 RX ADMIN — GABAPENTIN 800 MILLIGRAM(S): 300 CAPSULE ORAL at 06:30

## 2025-01-12 RX ADMIN — Medication 25 MILLIGRAM(S): at 10:06

## 2025-01-12 RX ADMIN — MONTELUKAST SODIUM 10 MILLIGRAM(S): 10 TABLET, FILM COATED ORAL at 22:51

## 2025-01-12 RX ADMIN — ATORVASTATIN CALCIUM 10 MILLIGRAM(S): 40 TABLET, FILM COATED ORAL at 22:50

## 2025-01-12 RX ADMIN — Medication 600 MILLIGRAM(S): at 06:31

## 2025-01-12 RX ADMIN — RIVAROXABAN 20 MILLIGRAM(S): 2.5 TABLET, FILM COATED ORAL at 16:55

## 2025-01-12 RX ADMIN — GABAPENTIN 800 MILLIGRAM(S): 300 CAPSULE ORAL at 14:18

## 2025-01-12 RX ADMIN — Medication 1 TABLET(S): at 10:07

## 2025-01-12 RX ADMIN — Medication 2 MILLIGRAM(S): at 07:44

## 2025-01-12 NOTE — PROGRESS NOTE ADULT - SUBJECTIVE AND OBJECTIVE BOX
CC: Patient is a 74y old  Female who presents with a chief complaint of surgical site infection (10 Barron 2025 16:33)      INTERVAL EVENTS: LINDA    SUBJECTIVE / INTERVAL HPI: Patient seen and examined at bedside. patient uncomfortable in pain this am     ROS: negative unless otherwise stated above.    VITAL SIGNS:  Vital Signs Last 24 Hrs  T(C): 36.6 (12 Jan 2025 07:40), Max: 36.7 (11 Jan 2025 15:40)  T(F): 97.9 (12 Jan 2025 07:40), Max: 98.1 (11 Jan 2025 15:40)  HR: 52 (12 Jan 2025 07:40) (52 - 53)  BP: 132/54 (12 Jan 2025 07:40) (121/43 - 132/54)  BP(mean): --  RR: 18 (12 Jan 2025 07:40) (18 - 19)  SpO2: 97% (12 Jan 2025 07:40) (95% - 97%)    Parameters below as of 12 Jan 2025 07:40  Patient On (Oxygen Delivery Method): room air            PHYSICAL EXAM:    General: NAD  HEENT: MMM  Neck: supple  Cardiovascular: +S1/S2; RRR  Respiratory: CTA B/L; no W/R/R  Gastrointestinal: soft, NT/ND  Extremities: WWP; no edema, clubbing or cyanosis  Vascular: 2+ radial, DP/PT pulses B/L groin wound vac in place  Neurological: AAOx3; no focal deficits    MEDICATIONS:  MEDICATIONS  (STANDING):  aMIOdarone    Tablet 200 milliGRAM(s) Oral daily  atorvastatin 10 milliGRAM(s) Oral at bedtime  ciprofloxacin     Tablet 500 milliGRAM(s) Oral every 12 hours  clopidogrel Tablet 75 milliGRAM(s) Oral daily  collagenase Ointment 1 Application(s) Topical once  diltiazem    milliGRAM(s) Oral daily  ezetimibe 10 milliGRAM(s) Oral daily  furosemide    Tablet 40 milliGRAM(s) Oral daily  gabapentin 800 milliGRAM(s) Oral three times a day  influenza  Vaccine (HIGH DOSE) 0.5 milliLiter(s) IntraMuscular once  lactobacillus acidophilus 1 Tablet(s) Oral daily  metoprolol succinate ER 25 milliGRAM(s) Oral daily  montelukast 10 milliGRAM(s) Oral at bedtime  multivitamin/minerals 1 Tablet(s) Oral daily  nystatin Cream 1 Application(s) Topical every 12 hours  rivaroxaban 20 milliGRAM(s) Oral with dinner  spironolactone 25 milliGRAM(s) Oral daily  thiamine 100 milliGRAM(s) Oral daily    MEDICATIONS  (PRN):  acetaminophen     Tablet .. 650 milliGRAM(s) Oral every 6 hours PRN Mild Pain (1 - 3)  guaiFENesin  milliGRAM(s) Oral every 12 hours PRN Cough  morphine  - Injectable 2 milliGRAM(s) IV Push every 6 hours PRN Severe Pain (7 - 10)  ondansetron   Disintegrating Tablet 4 milliGRAM(s) Oral every 6 hours PRN Nausea and/or Vomiting  oxyCODONE    IR 10 milliGRAM(s) Oral every 8 hours PRN Severe Pain (7 - 10)      ALLERGIES:  Allergies    pregabalin (Unknown)  PC Pen VK (Rash)  latex (Unknown)  penicillin (Hives; Urticaria)    Intolerances        LABS:                        7.8    8.98  )-----------( 343      ( 12 Jan 2025 06:41 )             27.1     01-12    136  |  106  |  28[H]  ----------------------------<  104[H]  4.5   |  28  |  0.71    Ca    8.7      12 Jan 2025 06:41  Phos  3.9     01-12  Mg     1.8     01-12    TPro  6.6  /  Alb  2.4[L]  /  TBili  0.3  /  DBili  x   /  AST  9[L]  /  ALT  9[L]  /  AlkPhos  69  01-12      Urinalysis Basic - ( 12 Jan 2025 06:41 )    Color: x / Appearance: x / SG: x / pH: x  Gluc: 104 mg/dL / Ketone: x  / Bili: x / Urobili: x   Blood: x / Protein: x / Nitrite: x   Leuk Esterase: x / RBC: x / WBC x   Sq Epi: x / Non Sq Epi: x / Bacteria: x      CAPILLARY BLOOD GLUCOSE          RADIOLOGY & ADDITIONAL TESTS: Reviewed.

## 2025-01-12 NOTE — PROGRESS NOTE ADULT - ASSESSMENT
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     POD#4 s/p right groin excisional debridement, drainage of seroma and wound vac placement    PLAN  WV changed at bedside on 1/11  ID consulted; appreciated recs: currently on cipro  Podiatry consult : Dressing changes of foot wounds q48hrs  Resumed home meds    Home [place] [facility] form filled    Plan discussed with Dr. Manjarrez

## 2025-01-12 NOTE — PROGRESS NOTE ADULT - ATTENDING COMMENTS
POD1 s/p right groin wound debridement and drainage of collection doing well vac in place functioning without issues  continue abx while cultures come back; ID following  PT and OOB  restart AC
seen and examined still with pain at right groin  continue abx  continue dressings ans groin wound care  plastics eval  plan for OR 1/8 for groin exploration debridement and possible muscle flap joint procedure with plastics   preop NPO
seen and examined doing well on oral abx wound vac was changed yesterday no major issues  dispo planning for home with home vac and nursing care  follow up in wound care center   continue AC and plavix
seen and examined dressing changed at bedside some granulation tissue noted and sound fibrinous exudate no malodor no purulence new vac applied  abx changed to PO per ID  plan for dispo with home vac and close follow up at [facility]  continue AC and plavix  OOB/ PT
seen and examined still with pain at right groin  continue abx  continue dressings ans groin wound care  plastics eval  plan for OR 1/8 for groin exploration debridement and possible muscle flap joint procedure with plastics
stable continuing on abx no fevers or chills wound care OR this week for right groin exploration debridement possible muscle flap

## 2025-01-12 NOTE — PROGRESS NOTE ADULT - ASSESSMENT
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  Medicine consulted for comanagement.    #R groin surgical site infection  -patient under vasc service followed by  ID   -BCX: NGTD  -tissue cx w enterococcus -- e faecalis amp S howver pt with penicillin allergy   -s/p  vanco cefepime flagyl  -changed to cipro 500 po q12, will need to take for likely a month, discussed with ID   -s/p wound debridement w penrose drain and wound vac placement on 1/8/25 replaced yesterday patient with worse pain today     #chronic HFpEF  -TTE last month:  LV cavity moderately dilated, normal wall thickness, normal LVEF, severe (grade 3) LV diastolic dysfunction, severe LA dilatation, mod to moderate RA dilatation, normal RV size and function.   -no acute signs of HF at this time  -resume po lasix -and spironolactone  -IVF stopped  -c/w metoprolol ER with hold parameters   - Farxiga on hold resume at  discharge   -Monitor weights    #Hx of Afib s/p ablation  -c/w Cardizem CD and amiodarone.   -xarelto restarted per vascular    #HLD  -on home Zetia    # Anemia,  iron deficiency, B12, folate def   and chronic Dz   -baseline 7s to 8s. normocytic.  -no signs or sx of bleeding at this time  -f/u iron studies:  mixed chronic  Dz and iron def   -B12/folate reviewed past admission, both low   -started  B12 SQ, Folate IV, switch to PO after 5 doses   -s/p 3 doses iv Fe  -trend CBC     DVT ppx- SCDs    Will follow. Anticipate remain in house thru weekend per vascular recs, vascular working w CM to get wound vac set up for home. Will need to be on cipro po x 1 month.

## 2025-01-13 ENCOUNTER — TRANSCRIPTION ENCOUNTER (OUTPATIENT)
Age: 75
End: 2025-01-13

## 2025-01-13 LAB
ANION GAP SERPL CALC-SCNC: 4 MMOL/L — LOW (ref 5–17)
BUN SERPL-MCNC: 29 MG/DL — HIGH (ref 7–23)
CALCIUM SERPL-MCNC: 8.8 MG/DL — SIGNIFICANT CHANGE UP (ref 8.5–10.1)
CHLORIDE SERPL-SCNC: 101 MMOL/L — SIGNIFICANT CHANGE UP (ref 96–108)
CO2 SERPL-SCNC: 28 MMOL/L — SIGNIFICANT CHANGE UP (ref 22–31)
CREAT SERPL-MCNC: 0.7 MG/DL — SIGNIFICANT CHANGE UP (ref 0.5–1.3)
EGFR: 90 ML/MIN/1.73M2 — SIGNIFICANT CHANGE UP
GLUCOSE SERPL-MCNC: 112 MG/DL — HIGH (ref 70–99)
HCT VFR BLD CALC: 28.5 % — LOW (ref 34.5–45)
HGB BLD-MCNC: 8.1 G/DL — LOW (ref 11.5–15.5)
MAGNESIUM SERPL-MCNC: 1.9 MG/DL — SIGNIFICANT CHANGE UP (ref 1.6–2.6)
MCHC RBC-ENTMCNC: 24.4 PG — LOW (ref 27–34)
MCHC RBC-ENTMCNC: 28.4 G/DL — LOW (ref 32–36)
MCV RBC AUTO: 85.8 FL — SIGNIFICANT CHANGE UP (ref 80–100)
PHOSPHATE SERPL-MCNC: 3.9 MG/DL — SIGNIFICANT CHANGE UP (ref 2.5–4.5)
PLATELET # BLD AUTO: 341 K/UL — SIGNIFICANT CHANGE UP (ref 150–400)
POTASSIUM SERPL-MCNC: 4 MMOL/L — SIGNIFICANT CHANGE UP (ref 3.5–5.3)
POTASSIUM SERPL-SCNC: 4 MMOL/L — SIGNIFICANT CHANGE UP (ref 3.5–5.3)
RBC # BLD: 3.32 M/UL — LOW (ref 3.8–5.2)
RBC # FLD: 18.9 % — HIGH (ref 10.3–14.5)
SODIUM SERPL-SCNC: 133 MMOL/L — LOW (ref 135–145)
WBC # BLD: 8 K/UL — SIGNIFICANT CHANGE UP (ref 3.8–10.5)
WBC # FLD AUTO: 8 K/UL — SIGNIFICANT CHANGE UP (ref 3.8–10.5)

## 2025-01-13 PROCEDURE — 99232 SBSQ HOSP IP/OBS MODERATE 35: CPT

## 2025-01-13 RX ORDER — ZOLPIDEM TARTRATE 5 MG
5 TABLET ORAL AT BEDTIME
Refills: 0 | Status: DISCONTINUED | OUTPATIENT
Start: 2025-01-13 | End: 2025-01-14

## 2025-01-13 RX ORDER — ZOLPIDEM TARTRATE 5 MG
10 TABLET ORAL ONCE
Refills: 0 | Status: DISCONTINUED | OUTPATIENT
Start: 2025-01-13 | End: 2025-01-13

## 2025-01-13 RX ORDER — OXYCODONE HCL 15 MG
10 TABLET ORAL ONCE
Refills: 0 | Status: DISCONTINUED | OUTPATIENT
Start: 2025-01-13 | End: 2025-01-13

## 2025-01-13 RX ADMIN — AMIODARONE HYDROCHLORIDE 200 MILLIGRAM(S): 200 TABLET ORAL at 11:02

## 2025-01-13 RX ADMIN — Medication 600 MILLIGRAM(S): at 10:57

## 2025-01-13 RX ADMIN — NYSTATIN TOPICAL POWDER 1 APPLICATION(S): 100000 POWDER TOPICAL at 11:04

## 2025-01-13 RX ADMIN — MONTELUKAST SODIUM 10 MILLIGRAM(S): 10 TABLET, FILM COATED ORAL at 23:04

## 2025-01-13 RX ADMIN — GABAPENTIN 800 MILLIGRAM(S): 300 CAPSULE ORAL at 23:05

## 2025-01-13 RX ADMIN — Medication 1 TABLET(S): at 10:57

## 2025-01-13 RX ADMIN — NYSTATIN TOPICAL POWDER 1 APPLICATION(S): 100000 POWDER TOPICAL at 23:00

## 2025-01-13 RX ADMIN — CIPROFLOXACIN HYDROCHLORIDE 500 MILLIGRAM(S): 500 TABLET, FILM COATED ORAL at 23:04

## 2025-01-13 RX ADMIN — DILTIAZEM HYDROCHLORIDE 120 MILLIGRAM(S): 300 CAPSULE, COATED, EXTENDED RELEASE ORAL at 11:01

## 2025-01-13 RX ADMIN — Medication 40 MILLIGRAM(S): at 06:27

## 2025-01-13 RX ADMIN — Medication 25 MILLIGRAM(S): at 11:02

## 2025-01-13 RX ADMIN — ATORVASTATIN CALCIUM 10 MILLIGRAM(S): 40 TABLET, FILM COATED ORAL at 23:00

## 2025-01-13 RX ADMIN — CIPROFLOXACIN HYDROCHLORIDE 500 MILLIGRAM(S): 500 TABLET, FILM COATED ORAL at 10:56

## 2025-01-13 RX ADMIN — EZETIMIBE 10 MILLIGRAM(S): 10 TABLET ORAL at 10:56

## 2025-01-13 RX ADMIN — THIAMINE HYDROCHLORIDE 100 MILLIGRAM(S): 100 INJECTION, SOLUTION INTRAMUSCULAR; INTRAVENOUS at 10:56

## 2025-01-13 RX ADMIN — CLOPIDOGREL BISULFATE 75 MILLIGRAM(S): 75 TABLET, FILM COATED ORAL at 10:57

## 2025-01-13 RX ADMIN — RIVAROXABAN 20 MILLIGRAM(S): 2.5 TABLET, FILM COATED ORAL at 18:39

## 2025-01-13 RX ADMIN — GABAPENTIN 800 MILLIGRAM(S): 300 CAPSULE ORAL at 06:27

## 2025-01-13 RX ADMIN — SPIRONOLACTONE 25 MILLIGRAM(S): 50 TABLET ORAL at 11:02

## 2025-01-13 RX ADMIN — Medication 10 MILLIGRAM(S): at 00:47

## 2025-01-13 RX ADMIN — Medication 10 MILLIGRAM(S): at 10:57

## 2025-01-13 RX ADMIN — Medication 10 MILLIGRAM(S): at 16:00

## 2025-01-13 RX ADMIN — Medication 2 MILLIGRAM(S): at 15:33

## 2025-01-13 RX ADMIN — Medication 5 MILLIGRAM(S): at 23:05

## 2025-01-13 RX ADMIN — ACETAMINOPHEN 650 MILLIGRAM(S): 80 SOLUTION/ DROPS ORAL at 11:01

## 2025-01-13 NOTE — DISCHARGE NOTE NURSING/CASE MANAGEMENT/SOCIAL WORK - NSDCPEFALRISK_GEN_ALL_CORE
For information on Fall & Injury Prevention, visit: [email or web address] OR  [email or web address] OR  https://www.cdc.gov/steadi/patient.html

## 2025-01-13 NOTE — DISCHARGE NOTE NURSING/CASE MANAGEMENT/SOCIAL WORK - PATIENT PORTAL LINK FT
You can access the FollowMyHealth Patient Portal offered by [facility] by registering at the following website: [email or web address]. By joining [facility]’s FollowMyHealth portal, you will also be able to view your health information using other applications (apps) compatible with our system.

## 2025-01-13 NOTE — PROGRESS NOTE ADULT - ASSESSMENT
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     POD#4 s/p right groin excisional debridement, drainage of seroma and wound vac placement    PLAN  Plan to DC home as per case managment  WV changed at bedside on 1/11  PT worked with her and rec'ed home PT  ID consulted; appreciated recs: currently on cipro  Podiatry consult : Dressing changes of foot wounds q48hrs  Resumed home meds    Home [place] [facility] form filled    Plan discussed with Dr. Manjarrez

## 2025-01-13 NOTE — PROGRESS NOTE ADULT - PROVIDER SPECIALTY LIST ADULT
Hospitalist
Hospitalist
Vascular Surgery
Vascular Surgery
Hospitalist
Infectious Disease
Podiatry
Vascular Surgery
Vascular Surgery
Hospitalist
Infectious Disease
Podiatry
Surgery
Vascular Surgery
Hospitalist
Infectious Disease
Hospitalist

## 2025-01-13 NOTE — PROGRESS NOTE ADULT - TIME BILLING
Time spent  coordinating the patient's care. This includes reviewing documentation pertinent to this admission, results and imaging. Further tests, medications, and procedures have been ordered as indicated. Laboratory results and the plan of care were communicated to the patient and . Discussed care plan with consultants including ID, vascular. Supporting documentation was completed and added to the patient's chart.
Time spent coordinating the patient's care. This includes reviewing documentation pertinent to this admission, results and imaging. Further tests, medications, and procedures have been ordered as indicated. Laboratory results and the plan of care were communicated to the patient and family.  Discussed care plan with nursing and will discuss plan and care with appropriate consultant(s). Supporting documentation was completed and added to the patient's chart incuding updated MOLST.
Time spent coordinating the patient's care. This includes reviewing documentation pertinent to this admission, results and imaging. Further tests, medications, and procedures have been ordered as indicated. Laboratory results and the plan of care were communicated to the patient and family.  Discussed care plan with nursing and will discuss plan and care with appropriate consultant(s). Supporting documentation was completed and added to the patient's chart incuding updated MOLST.
Time spent  coordinating the patient's care. This includes reviewing documentation pertinent to this admission, results and imaging. Further tests, medications, and procedures have been ordered as indicated. Laboratory results and the plan of care were communicated to the patient. Discussed care plan with consultants including ID, vascular. Supporting documentation was completed and added to the patient's chart.

## 2025-01-13 NOTE — DISCHARGE NOTE NURSING/CASE MANAGEMENT/SOCIAL WORK - NSDCFUADDAPPT_GEN_ALL_CORE_FT
Follow up appointment with JEFF Sheth (Cardiology/PCP) on Wednesday, January 22nd at 2:45pm. Address: [address] [place] [address].

## 2025-01-13 NOTE — PROGRESS NOTE ADULT - ASSESSMENT
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  Medicine consulted for comanagement.    #R groin surgical site infection  -patient under vasc service followed by  ID   -BCX: NGTD  -tissue cx w enterococcus -- e faecalis amp S howver pt with penicillin allergy   -s/p  vanco cefepime flagyl  -changed to cipro 500 po q12, will need to take for likely a month, discussed with ID   -s/p wound debridement w penrose drain and wound vac placement on 1/8/25 replaced 1/11     #chronic HFpEF  -TTE last month:  LV cavity moderately dilated, normal wall thickness, normal LVEF, severe (grade 3) LV diastolic dysfunction, severe LA dilatation, mod to moderate RA dilatation, normal RV size and function.   -no acute signs of HF at this time  -resume po lasix -and spironolactone  -IVF stopped  -c/w metoprolol ER with hold parameters   - Farxiga on hold resume at  discharge   -Monitor weights    #Hx of Afib s/p ablation  -c/w Cardizem CD and amiodarone.   -xarelto restarted per vascular    #HLD  -on home Zetia    # Anemia,  iron deficiency, B12, folate def   and chronic Dz   -baseline 7s to 8s. normocytic.  -no signs or sx of bleeding at this time  -f/u iron studies:  mixed chronic  Dz and iron def   -B12/folate reviewed past admission, both low   -started  B12 SQ, Folate IV, switch to PO after 5 doses   -s/p 3 doses iv Fe  -trend CBC     DVT ppx- SCDs    Will follow.  vascular working w CM to get wound vac set up for home. Will need to be on cipro po x 1 month.

## 2025-01-13 NOTE — PROGRESS NOTE ADULT - SUBJECTIVE AND OBJECTIVE BOX
1/13/2025: Patient was seen by the podiatry team today AM, pt resting bedside. NAD, No other podiatry complaints.        PMH: Diabetes mellitus type II, controlled    Atrial fibrillation    Congestive heart failure    Dyspnea    Morbid obesity with BMI of 40.0-44.9, adult    Neuropathy    Peripheral venous insufficiency    Thyroid nodule    HTN (hypertension)    Cellulitis    At risk for sleep apnea      PSH:History of esophagogastroduodenoscopy (EGD)    H/O colonoscopy    Other complications of gastric band procedure    S/P left knee arthroscopy    Status post medial meniscus repair    History of cholecystectomy    S/P cataract surgery        Allergies:pregabalin (Unknown)  PC Pen VK (Rash)  latex (Unknown)  penicillin (Hives; Urticaria)      Labs:                        8.1    8.00  )-----------( 341      ( 13 Jan 2025 06:50 )             28.5   01-13    133[L]  |  101  |  29[H]  ----------------------------<  112[H]  4.0   |  28  |  0.70    Ca    8.8      13 Jan 2025 06:50  Phos  3.9     01-13  Mg     1.9     01-13    TPro  6.6  /  Alb  2.4[L]  /  TBili  0.3  /  DBili  x   /  AST  9[L]  /  ALT  9[L]  /  AlkPhos  69  01-12            Vital Signs Last 24 Hrs  T(C): 36.6 (13 Jan 2025 07:45), Max: 36.8 (12 Jan 2025 16:52)  T(F): 97.9 (13 Jan 2025 07:45), Max: 98.2 (12 Jan 2025 16:52)  HR: 52 (13 Jan 2025 07:45) (52 - 58)  BP: 120/81 (13 Jan 2025 07:45) (104/61 - 124/46)  BP(mean): 74 (12 Jan 2025 16:52) (74 - 74)  RR: 18 (13 Jan 2025 07:45) (18 - 18)  SpO2: 95% (13 Jan 2025 07:45) (95% - 96%)    Parameters below as of 13 Jan 2025 07:45  Patient On (Oxygen Delivery Method): room air      REVIEW OF SYSTEMS:    CONSTITUTIONAL: No weakness, fevers or chills  EYES: No visual changes  RESPIRATORY: No cough, wheezing; No shortness of breath  CARDIOVASCULAR: No chest pain or palpitations  GASTROINTESTINAL: No abdominal or epigastric pain. No nausea, vomiting; No diarrhea or constipation.   GENITOURINARY: No dysuria, frequency or hematuria  NEUROLOGICAL: No numbness or weakness  SKIN: See physical examination.  All other review of systems is negative unless indicated above    Physical Exam:   Constitutional: NAD, alert;  Lower Extremity Focus  Derm:  Skin warm, dry and supple bilateral.    Ulceration Right heel wound, partial thickness in nature, wound base granular wound size (approx 1.5 cm X 0.5cm X 0.1cm) and surrounding predominant hyperkeratotic tissue, no stephen-wound erythema/edema, no pus/purulence, no crepitus/fluctuance, no tracking/tunneling, no probe to bone.  Partial thickness ulceration noted to posterior medial Left lower midleg, some serous drainage (improving), superficial wound bed, no malodor,  no crepitus/fluctuance, no tracking/tunneling, no PTB.   Vascular: Dorsalis Pedis and Posterior Tibial pulses weakly palpable  Neuro: Protective sensation intact to the level of the digits bilateral.  MSK: Muscle strength 5/5 all major muscle groups bilateral. Pt is minimally ambulatory

## 2025-01-13 NOTE — PROGRESS NOTE ADULT - SUBJECTIVE AND OBJECTIVE BOX
SURGERY DAILY PROGRESS NOTE:     Subjective:  Patient seen and examined at bedside during am rounds. AVSS. Denies any fevers, chills, n/v/d, chest pain or shortness of breath    Objective:    MEDICATIONS  (STANDING):  aMIOdarone    Tablet 200 milliGRAM(s) Oral daily  atorvastatin 10 milliGRAM(s) Oral at bedtime  ciprofloxacin     Tablet 500 milliGRAM(s) Oral every 12 hours  clopidogrel Tablet 75 milliGRAM(s) Oral daily  collagenase Ointment 1 Application(s) Topical once  diltiazem    milliGRAM(s) Oral daily  ezetimibe 10 milliGRAM(s) Oral daily  furosemide    Tablet 40 milliGRAM(s) Oral daily  gabapentin 800 milliGRAM(s) Oral three times a day  influenza  Vaccine (HIGH DOSE) 0.5 milliLiter(s) IntraMuscular once  lactobacillus acidophilus 1 Tablet(s) Oral daily  metoprolol succinate ER 25 milliGRAM(s) Oral daily  montelukast 10 milliGRAM(s) Oral at bedtime  multivitamin/minerals 1 Tablet(s) Oral daily  nystatin Cream 1 Application(s) Topical every 12 hours  rivaroxaban 20 milliGRAM(s) Oral with dinner  spironolactone 25 milliGRAM(s) Oral daily  thiamine 100 milliGRAM(s) Oral daily    MEDICATIONS  (PRN):  acetaminophen     Tablet .. 650 milliGRAM(s) Oral every 6 hours PRN Mild Pain (1 - 3)  guaiFENesin  milliGRAM(s) Oral every 12 hours PRN Cough  morphine  - Injectable 2 milliGRAM(s) IV Push every 6 hours PRN Severe Pain (7 - 10)  ondansetron   Disintegrating Tablet 4 milliGRAM(s) Oral every 6 hours PRN Nausea and/or Vomiting  oxyCODONE    IR 10 milliGRAM(s) Oral every 8 hours PRN Severe Pain (7 - 10)      Vital Signs Last 24 Hrs  T(C): 36.6 (12 Jan 2025 23:48), Max: 36.8 (12 Jan 2025 16:52)  T(F): 97.8 (12 Jan 2025 23:48), Max: 98.2 (12 Jan 2025 16:52)  HR: 58 (12 Jan 2025 23:48) (52 - 58)  BP: 124/46 (12 Jan 2025 23:48) (104/61 - 132/54)  BP(mean): 74 (12 Jan 2025 16:52) (74 - 74)  RR: 18 (12 Jan 2025 22:35) (18 - 18)  SpO2: 95% (12 Jan 2025 23:48) (95% - 97%)    Parameters below as of 12 Jan 2025 22:35  Patient On (Oxygen Delivery Method): room air          PHYSICAL EXAM   Gen: well-appearing, in no acute distress  CV: pulse regularly present   Resp: airway patent, non-labored breathing  Abd: soft, NTND; no rebound or guarding. R groin [place] in situ   Ext. no cyanosis or edema      I&O's Detail      Daily     Daily     LABS:                        7.8    8.98  )-----------( 343      ( 12 Jan 2025 06:41 )             27.1     01-12    136  |  106  |  28[H]  ----------------------------<  104[H]  4.5   |  28  |  0.71    Ca    8.7      12 Jan 2025 06:41  Phos  3.9     01-12  Mg     1.8     01-12    TPro  6.6  /  Alb  2.4[L]  /  TBili  0.3  /  DBili  x   /  AST  9[L]  /  ALT  9[L]  /  AlkPhos  69  01-12      Urinalysis Basic - ( 12 Jan 2025 06:41 )    Color: x / Appearance: x / SG: x / pH: x  Gluc: 104 mg/dL / Ketone: x  / Bili: x / Urobili: x   Blood: x / Protein: x / Nitrite: x   Leuk Esterase: x / RBC: x / WBC x   Sq Epi: x / Non Sq Epi: x / Bacteria: x

## 2025-01-13 NOTE — DISCHARGE NOTE NURSING/CASE MANAGEMENT/SOCIAL WORK - FINANCIAL ASSISTANCE
[facility] provides services at a reduced cost to those who are determined to be eligible through [facility]’s financial assistance program. Information regarding [facility]’s financial assistance program can be found by going to [email or web address] or by calling [phone].

## 2025-01-13 NOTE — PROGRESS NOTE ADULT - SUBJECTIVE AND OBJECTIVE BOX
CC: Patient is a 74y old  Female who presents with a chief complaint of surgical site infection (10 Barron 2025 16:33)      INTERVAL EVENTS: LINDA    SUBJECTIVE / INTERVAL HPI: Patient seen and examined at bedside.     ROS: negative unless otherwise stated above.    VITAL SIGNS:  Vital Signs Last 24 Hrs  T(C): 36.6 (13 Jan 2025 07:45), Max: 36.8 (12 Jan 2025 16:52)  T(F): 97.9 (13 Jan 2025 07:45), Max: 98.2 (12 Jan 2025 16:52)  HR: 52 (13 Jan 2025 07:45) (52 - 58)  BP: 120/81 (13 Jan 2025 07:45) (104/61 - 124/46)  BP(mean): 74 (12 Jan 2025 16:52) (74 - 74)  RR: 18 (13 Jan 2025 07:45) (18 - 18)  SpO2: 95% (13 Jan 2025 07:45) (95% - 96%)    Parameters below as of 13 Jan 2025 07:45  Patient On (Oxygen Delivery Method): room air            PHYSICAL EXAM:    General: NAD  HEENT: MMM  Neck: supple  Cardiovascular: +S1/S2; RRR  Respiratory: CTA B/L; no W/R/R  Gastrointestinal: soft, NT/ND  Extremities: WWP; no edema, clubbing or cyanosis  Vascular: 2+ radial, DP/PT pulses B/L wound vac in place   Neurological: AAOx3; no focal deficits    MEDICATIONS:  MEDICATIONS  (STANDING):  aMIOdarone    Tablet 200 milliGRAM(s) Oral daily  atorvastatin 10 milliGRAM(s) Oral at bedtime  ciprofloxacin     Tablet 500 milliGRAM(s) Oral every 12 hours  clopidogrel Tablet 75 milliGRAM(s) Oral daily  collagenase Ointment 1 Application(s) Topical once  diltiazem    milliGRAM(s) Oral daily  ezetimibe 10 milliGRAM(s) Oral daily  furosemide    Tablet 40 milliGRAM(s) Oral daily  gabapentin 800 milliGRAM(s) Oral three times a day  influenza  Vaccine (HIGH DOSE) 0.5 milliLiter(s) IntraMuscular once  lactobacillus acidophilus 1 Tablet(s) Oral daily  metoprolol succinate ER 25 milliGRAM(s) Oral daily  montelukast 10 milliGRAM(s) Oral at bedtime  multivitamin/minerals 1 Tablet(s) Oral daily  nystatin Cream 1 Application(s) Topical every 12 hours  rivaroxaban 20 milliGRAM(s) Oral with dinner  spironolactone 25 milliGRAM(s) Oral daily  thiamine 100 milliGRAM(s) Oral daily    MEDICATIONS  (PRN):  acetaminophen     Tablet .. 650 milliGRAM(s) Oral every 6 hours PRN Mild Pain (1 - 3)  guaiFENesin  milliGRAM(s) Oral every 12 hours PRN Cough  morphine  - Injectable 2 milliGRAM(s) IV Push every 6 hours PRN Severe Pain (7 - 10)  ondansetron   Disintegrating Tablet 4 milliGRAM(s) Oral every 6 hours PRN Nausea and/or Vomiting  oxyCODONE    IR 10 milliGRAM(s) Oral every 8 hours PRN Severe Pain (7 - 10)      ALLERGIES:  Allergies    pregabalin (Unknown)  PC Pen VK (Rash)  latex (Unknown)  penicillin (Hives; Urticaria)    Intolerances        LABS:                        8.1    8.00  )-----------( 341      ( 13 Jan 2025 06:50 )             28.5     01-13    133[L]  |  101  |  29[H]  ----------------------------<  112[H]  4.0   |  28  |  0.70    Ca    8.8      13 Jan 2025 06:50  Phos  3.9     01-13  Mg     1.9     01-13    TPro  6.6  /  Alb  2.4[L]  /  TBili  0.3  /  DBili  x   /  AST  9[L]  /  ALT  9[L]  /  AlkPhos  69  01-12      Urinalysis Basic - ( 13 Jan 2025 06:50 )    Color: x / Appearance: x / SG: x / pH: x  Gluc: 112 mg/dL / Ketone: x  / Bili: x / Urobili: x   Blood: x / Protein: x / Nitrite: x   Leuk Esterase: x / RBC: x / WBC x   Sq Epi: x / Non Sq Epi: x / Bacteria: x      CAPILLARY BLOOD GLUCOSE          RADIOLOGY & ADDITIONAL TESTS: Reviewed.

## 2025-01-13 NOTE — PROGRESS NOTE ADULT - ASSESSMENT
A: 75 y/o Female seen for the following:   -Rt heel partial thickness pressure wound, stable without acute SOI  -Lt posterior lower leg partial thickness venous stasis wound, stable without acute SOI  -BLE venous stasis  -PVD    P:   Chart reviewed and Patient evaluated  Wound to the posterior Left lower mid leg, with some serous drainage, no malodor, superficial in nature, stable without SOI. Rt heel stage 2 pressure ulcer; stable without any SOI. Wounds improved compared to previous admission.  Applied betadine soaked gauze and Allevyn pad to Left LE wound and Right heel  WC: Santyl with dry sterile dressing to Rt heel. Santyl and Compressive dressings applied to Left Lower Extremity  Weight-Bearing-As-Tolerated to Bilateral Lower Extremity  Offload bilateral heels with two pillows under bilateral calfs or total CAIR boots to prevent further soft tissue damage as pt is has been predominately bedbound or sitting on a chair while placing pressure to heels.    No acute podiatric intervention warranted at this time. Continue with local wound care  Pt to follow up at [facility] with Dr. Stovall for continued wound care treatment.   All additional care per vascular and Med appreciated  Patient demonstrated verbal understanding of all interventions and tolerated interventions well without any complications  Podiatry will follow peripherally while in house      Case D/W with attending Dr. Stovall    Wound care instructions for the Rt heel and Lt lower leg to be changed every other day:  - Please apply santyl to the Right heel and Left lower leg  - Cover with gauze and fernando or kerlix, secure with tape.  Thank you

## 2025-01-14 ENCOUNTER — TRANSCRIPTION ENCOUNTER (OUTPATIENT)
Age: 75
End: 2025-01-14

## 2025-01-14 VITALS
RESPIRATION RATE: 18 BRPM | HEART RATE: 60 BPM | TEMPERATURE: 98 F | OXYGEN SATURATION: 99 % | SYSTOLIC BLOOD PRESSURE: 132 MMHG | DIASTOLIC BLOOD PRESSURE: 58 MMHG

## 2025-01-14 LAB
ALBUMIN SERPL ELPH-MCNC: 2.6 G/DL — LOW (ref 3.3–5)
ALP SERPL-CCNC: 73 U/L — SIGNIFICANT CHANGE UP (ref 40–120)
ALT FLD-CCNC: <6 U/L — LOW (ref 12–78)
ANION GAP SERPL CALC-SCNC: 4 MMOL/L — LOW (ref 5–17)
AST SERPL-CCNC: 10 U/L — LOW (ref 15–37)
BASOPHILS # BLD AUTO: 0.06 K/UL — SIGNIFICANT CHANGE UP (ref 0–0.2)
BASOPHILS NFR BLD AUTO: 0.7 % — SIGNIFICANT CHANGE UP (ref 0–2)
BILIRUB SERPL-MCNC: 0.2 MG/DL — SIGNIFICANT CHANGE UP (ref 0.2–1.2)
BUN SERPL-MCNC: 35 MG/DL — HIGH (ref 7–23)
CALCIUM SERPL-MCNC: 8.9 MG/DL — SIGNIFICANT CHANGE UP (ref 8.5–10.1)
CHLORIDE SERPL-SCNC: 104 MMOL/L — SIGNIFICANT CHANGE UP (ref 96–108)
CO2 SERPL-SCNC: 28 MMOL/L — SIGNIFICANT CHANGE UP (ref 22–31)
CREAT SERPL-MCNC: 0.87 MG/DL — SIGNIFICANT CHANGE UP (ref 0.5–1.3)
CULTURE RESULTS: ABNORMAL
CULTURE RESULTS: ABNORMAL
CULTURE RESULTS: SIGNIFICANT CHANGE UP
EGFR: 69 ML/MIN/1.73M2 — SIGNIFICANT CHANGE UP
EOSINOPHIL # BLD AUTO: 0.48 K/UL — SIGNIFICANT CHANGE UP (ref 0–0.5)
EOSINOPHIL NFR BLD AUTO: 5.6 % — SIGNIFICANT CHANGE UP (ref 0–6)
GLUCOSE SERPL-MCNC: 127 MG/DL — HIGH (ref 70–99)
HCT VFR BLD CALC: 27.7 % — LOW (ref 34.5–45)
HGB BLD-MCNC: 8 G/DL — LOW (ref 11.5–15.5)
IMM GRANULOCYTES NFR BLD AUTO: 0.5 % — SIGNIFICANT CHANGE UP (ref 0–0.9)
LYMPHOCYTES # BLD AUTO: 1.16 K/UL — SIGNIFICANT CHANGE UP (ref 1–3.3)
LYMPHOCYTES # BLD AUTO: 13.4 % — SIGNIFICANT CHANGE UP (ref 13–44)
MAGNESIUM SERPL-MCNC: 2 MG/DL — SIGNIFICANT CHANGE UP (ref 1.6–2.6)
MCHC RBC-ENTMCNC: 24.8 PG — LOW (ref 27–34)
MCHC RBC-ENTMCNC: 28.9 G/DL — LOW (ref 32–36)
MCV RBC AUTO: 85.8 FL — SIGNIFICANT CHANGE UP (ref 80–100)
MONOCYTES # BLD AUTO: 0.86 K/UL — SIGNIFICANT CHANGE UP (ref 0–0.9)
MONOCYTES NFR BLD AUTO: 10 % — SIGNIFICANT CHANGE UP (ref 2–14)
NEUTROPHILS # BLD AUTO: 6.04 K/UL — SIGNIFICANT CHANGE UP (ref 1.8–7.4)
NEUTROPHILS NFR BLD AUTO: 69.8 % — SIGNIFICANT CHANGE UP (ref 43–77)
ORGANISM # SPEC MICROSCOPIC CNT: ABNORMAL
ORGANISM # SPEC MICROSCOPIC CNT: SIGNIFICANT CHANGE UP
PHOSPHATE SERPL-MCNC: 4.4 MG/DL — SIGNIFICANT CHANGE UP (ref 2.5–4.5)
PLATELET # BLD AUTO: 375 K/UL — SIGNIFICANT CHANGE UP (ref 150–400)
POTASSIUM SERPL-MCNC: 3.9 MMOL/L — SIGNIFICANT CHANGE UP (ref 3.5–5.3)
POTASSIUM SERPL-SCNC: 3.9 MMOL/L — SIGNIFICANT CHANGE UP (ref 3.5–5.3)
PROT SERPL-MCNC: 7 GM/DL — SIGNIFICANT CHANGE UP (ref 6–8.3)
RBC # BLD: 3.23 M/UL — LOW (ref 3.8–5.2)
RBC # FLD: 18.9 % — HIGH (ref 10.3–14.5)
SODIUM SERPL-SCNC: 136 MMOL/L — SIGNIFICANT CHANGE UP (ref 135–145)
SPECIMEN SOURCE: SIGNIFICANT CHANGE UP
WBC # BLD: 8.64 K/UL — SIGNIFICANT CHANGE UP (ref 3.8–10.5)
WBC # FLD AUTO: 8.64 K/UL — SIGNIFICANT CHANGE UP (ref 3.8–10.5)

## 2025-01-14 PROCEDURE — 99233 SBSQ HOSP IP/OBS HIGH 50: CPT

## 2025-01-14 RX ORDER — CIPROFLOXACIN HYDROCHLORIDE 500 MG/1
1 TABLET, FILM COATED ORAL
Qty: 14 | Refills: 0
Start: 2025-01-14 | End: 2025-01-20

## 2025-01-14 RX ORDER — CIPROFLOXACIN HCL 500 MG
1 TABLET ORAL
Qty: 14 | Refills: 0 | DISCHARGE
Start: 2025-01-14 | End: 2025-01-21

## 2025-01-14 RX ADMIN — GABAPENTIN 800 MILLIGRAM(S): 300 CAPSULE ORAL at 06:27

## 2025-01-14 RX ADMIN — DILTIAZEM HYDROCHLORIDE 120 MILLIGRAM(S): 300 CAPSULE, COATED, EXTENDED RELEASE ORAL at 09:46

## 2025-01-14 RX ADMIN — SPIRONOLACTONE 25 MILLIGRAM(S): 50 TABLET ORAL at 09:51

## 2025-01-14 RX ADMIN — CLOPIDOGREL BISULFATE 75 MILLIGRAM(S): 75 TABLET, FILM COATED ORAL at 09:47

## 2025-01-14 RX ADMIN — NYSTATIN TOPICAL POWDER 1 APPLICATION(S): 100000 POWDER TOPICAL at 10:13

## 2025-01-14 RX ADMIN — EZETIMIBE 10 MILLIGRAM(S): 10 TABLET ORAL at 09:47

## 2025-01-14 RX ADMIN — Medication 1 TABLET(S): at 09:50

## 2025-01-14 RX ADMIN — Medication 40 MILLIGRAM(S): at 06:27

## 2025-01-14 RX ADMIN — Medication 25 MILLIGRAM(S): at 09:49

## 2025-01-14 RX ADMIN — THIAMINE HYDROCHLORIDE 100 MILLIGRAM(S): 100 INJECTION, SOLUTION INTRAMUSCULAR; INTRAVENOUS at 09:50

## 2025-01-14 RX ADMIN — CIPROFLOXACIN HYDROCHLORIDE 500 MILLIGRAM(S): 500 TABLET, FILM COATED ORAL at 09:46

## 2025-01-14 RX ADMIN — GABAPENTIN 800 MILLIGRAM(S): 300 CAPSULE ORAL at 13:21

## 2025-01-14 RX ADMIN — Medication 1 TABLET(S): at 09:48

## 2025-01-14 RX ADMIN — AMIODARONE HYDROCHLORIDE 200 MILLIGRAM(S): 200 TABLET ORAL at 09:46

## 2025-01-14 RX ADMIN — Medication 10 MILLIGRAM(S): at 14:14

## 2025-01-14 NOTE — DISCHARGE NOTE PROVIDER - NSDCMRMEDTOKEN_GEN_ALL_CORE_FT
Acidophilus oral tablet: 1 tab(s) orally once a day  amiodarone 200 mg oral tablet: 1 tab(s) orally once a day  Bactrim  mg-160 mg oral tablet: 1 tab(s) orally 2 times a day  Cardizem  mg/24 hours oral capsule, extended release: 1 cap(s) orally once a day  clopidogrel 75 mg oral tablet: 1 tab(s) orally once a day  ezetimibe 10 mg oral tablet: 1 tab(s) orally once a day (in the evening)  furosemide 40 mg oral tablet: 1 tab(s) orally once a day  gabapentin 800 mg oral tablet: 1 tab(s) orally 3 times a day  metoprolol succinate 25 mg oral tablet, extended release: 1 tab(s) orally once a day  montelukast 10 mg oral tablet: 1 tab(s) orally once a day (at bedtime)  Multiple Vitamins with Minerals oral tablet: 1 tab(s) orally once a day  oxyCODONE 5 mg oral tablet: 1 tab(s) orally every 8 hours as needed for Severe Pain (7 - 10) May take 1/2 tab for moderate pain MDD: 15mg  pravastatin 10 mg oral tablet: 1 tab(s) orally once a day (in the evening)  spironolactone 25 mg oral tablet: 1 tab(s) orally once a day  thiamine 100 mg oral tablet: 1 tab(s) orally once a day  Tylenol 325 mg oral tablet: 2 tab(s) orally every 6 hours As needed Temp greater or equal to 38C (100.4F), Mild Pain (1 - 3)  Vitamin D3 25 mcg (1000 intl units) oral capsule: 1 cap(s) orally once a day  Xarelto 20 mg oral tablet: 1 tab(s) orally once a day  zolpidem 10 mg oral tablet: 1 tab(s) orally once a day (at bedtime) as needed MDD: 1   Acidophilus oral tablet: 1 tab(s) orally once a day  amiodarone 200 mg oral tablet: 1 tab(s) orally once a day  Cardizem  mg/24 hours oral capsule, extended release: 1 cap(s) orally once a day  ciprofloxacin 500 mg oral tablet: 1 tab(s) orally every 12 hours  clopidogrel 75 mg oral tablet: 1 tab(s) orally once a day  ezetimibe 10 mg oral tablet: 1 tab(s) orally once a day (in the evening)  furosemide 40 mg oral tablet: 1 tab(s) orally once a day  gabapentin 800 mg oral tablet: 1 tab(s) orally 3 times a day  metoprolol succinate 25 mg oral tablet, extended release: 1 tab(s) orally once a day  montelukast 10 mg oral tablet: 1 tab(s) orally once a day (at bedtime)  Multiple Vitamins with Minerals oral tablet: 1 tab(s) orally once a day  oxyCODONE 5 mg oral tablet: 1 tab(s) orally every 8 hours as needed for Severe Pain (7 - 10) May take 1/2 tab for moderate pain MDD: 15mg  pravastatin 10 mg oral tablet: 1 tab(s) orally once a day (in the evening)  spironolactone 25 mg oral tablet: 1 tab(s) orally once a day  thiamine 100 mg oral tablet: 1 tab(s) orally once a day  Tylenol 325 mg oral tablet: 2 tab(s) orally every 6 hours As needed Temp greater or equal to 38C (100.4F), Mild Pain (1 - 3)  Vitamin D3 25 mcg (1000 intl units) oral capsule: 1 cap(s) orally once a day  Xarelto 20 mg oral tablet: 1 tab(s) orally once a day  zolpidem 10 mg oral tablet: 1 tab(s) orally once a day (at bedtime) as needed MDD: 1

## 2025-01-14 NOTE — CONSULT NOTE ADULT - ASSESSMENT
75 yo woman with DM, HLD, CHF, AS s/p TAVR, afib on Xarelto, PVD, PAD, B/L CFA stenosis s/p R SFA endarterectomy 12/17/24, presented 1/3/25 with R groin purulent drainage, admitted for further management.     R groin infection  S/P debridement, washout, cultures etc. Management as per Vascular Surgery and ID. Antibiotics as per ID. Wound vac.     Left lower midleg wound  Some serous drainage, no malodor, superficial in nature, stable without signs of infection. Wound care as per Podiatry    R heel stage 2 pressure ulcer  Stable without any signs of infection, improved compared to previous admission. Wound care as per Podiatry. Offload bilateral heels. Pt to follow up at [facility] with Dr. Stovall for continued wound care treatment.     Chronic HFpEF  TTE last month w/ LV cavity moderately dilated, normal wall thickness, normal LVEF, severe (grade 3) LV diastolic dysfunction, severe LA dilatation, mod to moderate RA dilatation, normal RV size and function. No signs decompensated HF at this time. Continue diuretics, metoprolol XL. Resume Farxiga upon discharge. Monitor weight as outpatient.     Afib s/p ablation  Continue Cardizem CD and amiodarone. Xarelto for stroke risk reduction    HLD  Resume home Zetia    Chronic normocytic anemia  Baseline Hgb 7s to 8s, stable. No signs of bleeding. Iron deficiency, B12, folate deficiency. Discharge on cyanocobalamin 1000mcg daily, folate 1mg daily, iron supplementation.

## 2025-01-14 NOTE — DISCHARGE NOTE PROVIDER - NSDCFUADDINST_GEN_ALL_CORE_FT
Change wound vac using a silver sponge on Mon, Wed and Friday.    wound care instruction: please change vac 3 days per week   -cleanse wound, use skin protector (Cavillon)   -place adaptic on skin between wound and penrose drain  -tuck drain on top of adaptic and place second adaptic on top (like an adaptic sandwich)  -use gray foam incoperate drain  -suction to 100mmHg

## 2025-01-14 NOTE — DISCHARGE NOTE PROVIDER - CARE PROVIDER_API CALL
May 10, 2018     Patient: Coretta Lockhart   YOB: 1977   Date of Visit: 5/10/2018       To Whom it May Concern:    Coretta Lockhart is under my professional care  She was seen in my office on 5/10/2018  If you have any questions or concerns, please don't hesitate to call           Sincerely,          Darek Infante MD        CC: No Recipients Zay Stovall  Podiatric Medicine and Surgery  [address] [place] [id number]  Phone: [phone]  Fax: [phone]  Follow Up Time: 2 weeks

## 2025-01-14 NOTE — CONSULT NOTE ADULT - SUBJECTIVE AND OBJECTIVE BOX
Chief Complaint: Purulent drainage from R groin    Interval Hx: Patient now much improved. No fever. Infection cleared. Wound vac arrangements made. Stable for discharge from Medicine standpoint.     ROS: Multi system review is comprehensively negative x 10 systems     Vitals:  T(F): 97.5 (14 Jan 2025 07:39), Max: 97.5 (13 Jan 2025 23:00)  HR: 60 (14 Jan 2025 07:39) (60 - 66)  BP: 132/58 (14 Jan 2025 07:39) (108/69 - 132/58)  RR: 18 (14 Jan 2025 07:39) (18 - 18)  SpO2: 99% (14 Jan 2025 07:39) (95% - 99%) on room air    Exam:  Gen: Comfortable appearing  HEENT: NCAT PERRL EOMI MMM clear oropharynx  Neck: Supple, no JVD  CVS: s1 s2, rate normal  Chest: Normal resp effort, lungs CTA B/L  Abd: Non distended, +BS, soft non tender  Ext: R groin wound vac in site. R heel wound/ulceration, partial thickness in nature, wound base granular, wound size (approx 1.5 cm X 0.5cm X 0.1cm) and surrounding predominant hyperkeratotic tissue, no stephen-wound erythema/edema, no pus/purulence, no crepitus/fluctuance, no tracking/tunneling, no probe to bone. Partial thickness ulceration noted to posterior medial left lower midleg, some serous drainage (improving), superficial wound bed, no malodor, no crepitus/fluctuance, no tracking/tunneling, no PTB. Dorsalis Pedis and Posterior Tibial pulses weakly palpable. Protective sensation intact to the level of the digits bilateral. Muscle strength 5/5 all major muscle groups bilateral.  Skin: Warm, dry    Labs:                        8.0    8.64  )--------( 375               27.7       136  |  104  |  35  ----------------------<  127  3.9   |  28  |  0.87    Ca    8.9        Phos  4.4       Mg     2.0         TPro  7.0  /  Alb  2.6  /  TBili  0.2  /  DBili  x   /  AST  10  /  ALT  <6  /  AlkPhos  73      Micro:  OR culture R groin 1/8: E.faecalis, C.glabrata  Blood culture 1/3: Negative    Imaging:  No new imaging    Meds:  MEDICATIONS  (STANDING):  aMIOdarone    Tablet 200 milliGRAM(s) Oral daily  atorvastatin 10 milliGRAM(s) Oral at bedtime  ciprofloxacin     Tablet 500 milliGRAM(s) Oral every 12 hours  clopidogrel Tablet 75 milliGRAM(s) Oral daily  collagenase Ointment 1 Application(s) Topical once  diltiazem    milliGRAM(s) Oral daily  ezetimibe 10 milliGRAM(s) Oral daily  furosemide    Tablet 40 milliGRAM(s) Oral daily  gabapentin 800 milliGRAM(s) Oral three times a day  influenza  Vaccine (HIGH DOSE) 0.5 milliLiter(s) IntraMuscular once  lactobacillus acidophilus 1 Tablet(s) Oral daily  metoprolol succinate ER 25 milliGRAM(s) Oral daily  montelukast 10 milliGRAM(s) Oral at bedtime  multivitamin/minerals 1 Tablet(s) Oral daily  nystatin Cream 1 Application(s) Topical every 12 hours  rivaroxaban 20 milliGRAM(s) Oral with dinner  spironolactone 25 milliGRAM(s) Oral daily  thiamine 100 milliGRAM(s) Oral daily    MEDICATIONS  (PRN):  acetaminophen     Tablet .. 650 milliGRAM(s) Oral every 6 hours PRN Mild Pain (1 - 3)  guaiFENesin  milliGRAM(s) Oral every 12 hours PRN Cough  morphine  - Injectable 2 milliGRAM(s) IV Push every 6 hours PRN Severe Pain (7 - 10)  ondansetron   Disintegrating Tablet 4 milliGRAM(s) Oral every 6 hours PRN Nausea and/or Vomiting  oxyCODONE    IR 10 milliGRAM(s) Oral every 8 hours PRN Severe Pain (7 - 10)  zolpidem 5 milliGRAM(s) Oral at bedtime PRN Insomnia

## 2025-01-14 NOTE — DISCHARGE NOTE PROVIDER - HOSPITAL COURSE
74 F, s/p Rt SFA endarterectomy on 12/17/24. POD#6 s/p right groin excisional debridement, drainage of seroma and wound vac placement. Wound vac changed on 1/11 & 1/13 at bedside; doing well, WV holding suction; AVSS; Plan for d/c today.   74 F, s/p Rt SFA endarterectomy on 12/17/24. POD#6 s/p right groin excisional debridement, drainage of seroma and wound vac placement. Wound vac changed on 1/11 & 1/13 at bedside; doing well, WV holding suction; AVSS; Plan for d/c today.

## 2025-01-14 NOTE — DISCHARGE NOTE PROVIDER - NSDCFUSCHEDAPPT_GEN_ALL_CORE_FT
[facility] Physician [facility]  VASCULAR [address]  Scheduled Appointment: 01/29/2025    Caryl Manjarrez  [facility] Physician [facility]  VASCULAR [address]  Scheduled Appointment: 01/29/2025

## 2025-01-14 NOTE — DISCHARGE NOTE PROVIDER - NSDCFUADDAPPT_GEN_ALL_CORE_FT
Follow up appointment with JEFF Sheth (Cardiology/PCP) on Wednesday, January 22nd at 2:45pm. Address: [address] [place] [address].   Follow up appointment with JEFF Sheth (Cardiology/PCP) on Wednesday, January 22nd at 2:45pm. Address: [address] [place] [address].    Prior to outreaching the patient, it was visible that the patient has secured a follow up appointment which was not scheduled by our team. Patient is scheduled with Dr. Manjarrez 1/29/25 11:30am [address] [facility]    Patient advised they did not want to proceed with scheduling appointments with the providers on their referrals. They will coordinate care on their own.

## 2025-01-15 RX ORDER — OXYCODONE 5 MG/1
5 TABLET ORAL
Qty: 15 | Refills: 0 | Status: ACTIVE | COMMUNITY
Start: 2025-01-15 | End: 1900-01-01

## 2025-01-18 ENCOUNTER — EMERGENCY (EMERGENCY)
Facility: HOSPITAL | Age: 75
LOS: 0 days | Discharge: HOME | End: 2025-01-18
Attending: FAMILY MEDICINE
Payer: MEDICARE

## 2025-01-18 VITALS — WEIGHT: 232.37 LBS | HEIGHT: 70 IN

## 2025-01-18 VITALS
RESPIRATION RATE: 18 BRPM | SYSTOLIC BLOOD PRESSURE: 119 MMHG | TEMPERATURE: 98 F | OXYGEN SATURATION: 100 % | HEART RATE: 64 BPM | DIASTOLIC BLOOD PRESSURE: 69 MMHG

## 2025-01-18 DIAGNOSIS — Z98.62 PERIPHERAL VASCULAR ANGIOPLASTY STATUS: ICD-10-CM

## 2025-01-18 DIAGNOSIS — Z79.02 LONG TERM (CURRENT) USE OF ANTITHROMBOTICS/ANTIPLATELETS: ICD-10-CM

## 2025-01-18 DIAGNOSIS — T81.89XA OTHER COMPLICATIONS OF PROCEDURES, NOT ELSEWHERE CLASSIFIED, INITIAL ENCOUNTER: ICD-10-CM

## 2025-01-18 DIAGNOSIS — Z91.040 LATEX ALLERGY STATUS: ICD-10-CM

## 2025-01-18 DIAGNOSIS — Z98.890 OTHER SPECIFIED POSTPROCEDURAL STATES: Chronic | ICD-10-CM

## 2025-01-18 DIAGNOSIS — Z90.49 ACQUIRED ABSENCE OF OTHER SPECIFIED PARTS OF DIGESTIVE TRACT: Chronic | ICD-10-CM

## 2025-01-18 DIAGNOSIS — K95.09 OTHER COMPLICATIONS OF GASTRIC BAND PROCEDURE: Chronic | ICD-10-CM

## 2025-01-18 DIAGNOSIS — I50.9 HEART FAILURE, UNSPECIFIED: ICD-10-CM

## 2025-01-18 DIAGNOSIS — Z98.49 CATARACT EXTRACTION STATUS, UNSPECIFIED EYE: Chronic | ICD-10-CM

## 2025-01-18 DIAGNOSIS — I48.91 UNSPECIFIED ATRIAL FIBRILLATION: ICD-10-CM

## 2025-01-18 DIAGNOSIS — Z88.8 ALLERGY STATUS TO OTHER DRUGS, MEDICAMENTS AND BIOLOGICAL SUBSTANCES: ICD-10-CM

## 2025-01-18 DIAGNOSIS — Z88.0 ALLERGY STATUS TO PENICILLIN: ICD-10-CM

## 2025-01-18 DIAGNOSIS — E11.51 TYPE 2 DIABETES MELLITUS WITH DIABETIC PERIPHERAL ANGIOPATHY WITHOUT GANGRENE: ICD-10-CM

## 2025-01-18 DIAGNOSIS — Z79.01 LONG TERM (CURRENT) USE OF ANTICOAGULANTS: ICD-10-CM

## 2025-01-18 DIAGNOSIS — E78.5 HYPERLIPIDEMIA, UNSPECIFIED: ICD-10-CM

## 2025-01-18 PROCEDURE — 99284 EMERGENCY DEPT VISIT MOD MDM: CPT

## 2025-01-18 PROCEDURE — 99284 EMERGENCY DEPT VISIT MOD MDM: CPT | Mod: FS

## 2025-01-18 RX ORDER — OXYCODONE HYDROCHLORIDE 30 MG/1
10 TABLET ORAL ONCE
Refills: 0 | Status: DISCONTINUED | OUTPATIENT
Start: 2025-01-18 | End: 2025-01-18

## 2025-01-18 RX ORDER — OXYCODONE HYDROCHLORIDE AND ACETAMINOPHEN 10; 325 MG/1; MG/1
1 TABLET ORAL ONCE
Refills: 0 | Status: DISCONTINUED | OUTPATIENT
Start: 2025-01-18 | End: 2025-01-18

## 2025-01-18 RX ADMIN — OXYCODONE HYDROCHLORIDE 10 MILLIGRAM(S): 30 TABLET ORAL at 20:48

## 2025-01-21 DIAGNOSIS — Z79.01 LONG TERM (CURRENT) USE OF ANTICOAGULANTS: ICD-10-CM

## 2025-01-21 DIAGNOSIS — I50.32 CHRONIC DIASTOLIC (CONGESTIVE) HEART FAILURE: ICD-10-CM

## 2025-01-21 DIAGNOSIS — I11.0 HYPERTENSIVE HEART DISEASE WITH HEART FAILURE: ICD-10-CM

## 2025-01-21 DIAGNOSIS — Z88.8 ALLERGY STATUS TO OTHER DRUGS, MEDICAMENTS AND BIOLOGICAL SUBSTANCES: ICD-10-CM

## 2025-01-21 DIAGNOSIS — B95.2 ENTEROCOCCUS AS THE CAUSE OF DISEASES CLASSIFIED ELSEWHERE: ICD-10-CM

## 2025-01-21 DIAGNOSIS — E11.622 TYPE 2 DIABETES MELLITUS WITH OTHER SKIN ULCER: ICD-10-CM

## 2025-01-21 DIAGNOSIS — Z98.84 BARIATRIC SURGERY STATUS: ICD-10-CM

## 2025-01-21 DIAGNOSIS — I87.2 VENOUS INSUFFICIENCY (CHRONIC) (PERIPHERAL): ICD-10-CM

## 2025-01-21 DIAGNOSIS — E11.42 TYPE 2 DIABETES MELLITUS WITH DIABETIC POLYNEUROPATHY: ICD-10-CM

## 2025-01-21 DIAGNOSIS — Z88.0 ALLERGY STATUS TO PENICILLIN: ICD-10-CM

## 2025-01-21 DIAGNOSIS — L89.612 PRESSURE ULCER OF RIGHT HEEL, STAGE 2: ICD-10-CM

## 2025-01-21 DIAGNOSIS — L76.34 POSTPROCEDURAL SEROMA OF SKIN AND SUBCUTANEOUS TISSUE FOLLOWING OTHER PROCEDURE: ICD-10-CM

## 2025-01-21 DIAGNOSIS — L03.314 CELLULITIS OF GROIN: ICD-10-CM

## 2025-01-21 DIAGNOSIS — E11.9 TYPE 2 DIABETES MELLITUS WITHOUT COMPLICATIONS: ICD-10-CM

## 2025-01-21 DIAGNOSIS — G47.33 OBSTRUCTIVE SLEEP APNEA (ADULT) (PEDIATRIC): ICD-10-CM

## 2025-01-21 DIAGNOSIS — Z79.02 LONG TERM (CURRENT) USE OF ANTITHROMBOTICS/ANTIPLATELETS: ICD-10-CM

## 2025-01-21 DIAGNOSIS — Z91.040 LATEX ALLERGY STATUS: ICD-10-CM

## 2025-01-21 DIAGNOSIS — I48.91 UNSPECIFIED ATRIAL FIBRILLATION: ICD-10-CM

## 2025-01-21 DIAGNOSIS — D50.9 IRON DEFICIENCY ANEMIA, UNSPECIFIED: ICD-10-CM

## 2025-01-21 DIAGNOSIS — L97.822 NON-PRESSURE CHRONIC ULCER OF OTHER PART OF LEFT LOWER LEG WITH FAT LAYER EXPOSED: ICD-10-CM

## 2025-01-21 DIAGNOSIS — Z11.52 ENCOUNTER FOR SCREENING FOR COVID-19: ICD-10-CM

## 2025-01-21 DIAGNOSIS — E11.52 TYPE 2 DIABETES MELLITUS WITH DIABETIC PERIPHERAL ANGIOPATHY WITH GANGRENE: ICD-10-CM

## 2025-01-21 DIAGNOSIS — T81.41XA INFECTION FOLLOWING A PROCEDURE, SUPERFICIAL INCISIONAL SURGICAL SITE, INITIAL ENCOUNTER: ICD-10-CM

## 2025-01-22 ENCOUNTER — INPATIENT (INPATIENT)
Facility: HOSPITAL | Age: 75
LOS: 12 days | Discharge: HOME | DRG: 812 | End: 2025-02-04
Attending: STUDENT IN AN ORGANIZED HEALTH CARE EDUCATION/TRAINING PROGRAM | Admitting: STUDENT IN AN ORGANIZED HEALTH CARE EDUCATION/TRAINING PROGRAM
Payer: MEDICARE

## 2025-01-22 VITALS — HEIGHT: 70 IN

## 2025-01-22 DIAGNOSIS — E78.5 HYPERLIPIDEMIA, UNSPECIFIED: ICD-10-CM

## 2025-01-22 DIAGNOSIS — D64.9 ANEMIA, UNSPECIFIED: ICD-10-CM

## 2025-01-22 DIAGNOSIS — I50.32 CHRONIC DIASTOLIC (CONGESTIVE) HEART FAILURE: ICD-10-CM

## 2025-01-22 DIAGNOSIS — Z98.890 OTHER SPECIFIED POSTPROCEDURAL STATES: Chronic | ICD-10-CM

## 2025-01-22 DIAGNOSIS — Z98.49 CATARACT EXTRACTION STATUS, UNSPECIFIED EYE: Chronic | ICD-10-CM

## 2025-01-22 DIAGNOSIS — E11.9 TYPE 2 DIABETES MELLITUS WITHOUT COMPLICATIONS: ICD-10-CM

## 2025-01-22 DIAGNOSIS — I48.91 UNSPECIFIED ATRIAL FIBRILLATION: ICD-10-CM

## 2025-01-22 DIAGNOSIS — A41.9 SEPSIS, UNSPECIFIED ORGANISM: ICD-10-CM

## 2025-01-22 DIAGNOSIS — Z29.9 ENCOUNTER FOR PROPHYLACTIC MEASURES, UNSPECIFIED: ICD-10-CM

## 2025-01-22 DIAGNOSIS — K95.09 OTHER COMPLICATIONS OF GASTRIC BAND PROCEDURE: Chronic | ICD-10-CM

## 2025-01-22 DIAGNOSIS — S31.109A UNSPECIFIED OPEN WOUND OF ABDOMINAL WALL, UNSPECIFIED QUADRANT WITHOUT PENETRATION INTO PERITONEAL CAVITY, INITIAL ENCOUNTER: ICD-10-CM

## 2025-01-22 DIAGNOSIS — Z90.49 ACQUIRED ABSENCE OF OTHER SPECIFIED PARTS OF DIGESTIVE TRACT: Chronic | ICD-10-CM

## 2025-01-22 LAB
ALBUMIN SERPL ELPH-MCNC: 2.5 G/DL — LOW (ref 3.3–5)
ALBUMIN SERPL ELPH-MCNC: 3.1 G/DL — LOW (ref 3.3–5)
ALP SERPL-CCNC: 59 U/L — SIGNIFICANT CHANGE UP (ref 40–120)
ALP SERPL-CCNC: 74 U/L — SIGNIFICANT CHANGE UP (ref 40–120)
ALT FLD-CCNC: <6 U/L — LOW (ref 12–78)
ALT FLD-CCNC: <6 U/L — LOW (ref 12–78)
ANION GAP SERPL CALC-SCNC: 7 MMOL/L — SIGNIFICANT CHANGE UP (ref 5–17)
ANION GAP SERPL CALC-SCNC: 8 MMOL/L — SIGNIFICANT CHANGE UP (ref 5–17)
ANISOCYTOSIS BLD QL: SIGNIFICANT CHANGE UP
APPEARANCE UR: ABNORMAL
APTT BLD: 33.6 SEC — SIGNIFICANT CHANGE UP (ref 24.5–35.6)
AST SERPL-CCNC: 10 U/L — LOW (ref 15–37)
AST SERPL-CCNC: 13 U/L — LOW (ref 15–37)
BACTERIA # UR AUTO: ABNORMAL /HPF
BASOPHILS # BLD AUTO: 0.07 K/UL — SIGNIFICANT CHANGE UP (ref 0–0.2)
BASOPHILS NFR BLD AUTO: 0.3 % — SIGNIFICANT CHANGE UP (ref 0–2)
BILIRUB SERPL-MCNC: 0.6 MG/DL — SIGNIFICANT CHANGE UP (ref 0.2–1.2)
BILIRUB SERPL-MCNC: 0.7 MG/DL — SIGNIFICANT CHANGE UP (ref 0.2–1.2)
BILIRUB UR-MCNC: NEGATIVE — SIGNIFICANT CHANGE UP
BLD GP AB SCN SERPL QL: SIGNIFICANT CHANGE UP
BUN SERPL-MCNC: 24 MG/DL — HIGH (ref 7–23)
BUN SERPL-MCNC: 28 MG/DL — HIGH (ref 7–23)
CALCIUM SERPL-MCNC: 8.1 MG/DL — LOW (ref 8.5–10.1)
CALCIUM SERPL-MCNC: 8.9 MG/DL — SIGNIFICANT CHANGE UP (ref 8.5–10.1)
CAST: 0 /LPF — SIGNIFICANT CHANGE UP (ref 0–4)
CHLORIDE SERPL-SCNC: 105 MMOL/L — SIGNIFICANT CHANGE UP (ref 96–108)
CHLORIDE SERPL-SCNC: 108 MMOL/L — SIGNIFICANT CHANGE UP (ref 96–108)
CK SERPL-CCNC: 182 U/L — SIGNIFICANT CHANGE UP (ref 26–192)
CO2 SERPL-SCNC: 21 MMOL/L — LOW (ref 22–31)
CO2 SERPL-SCNC: 23 MMOL/L — SIGNIFICANT CHANGE UP (ref 22–31)
COLOR SPEC: YELLOW — SIGNIFICANT CHANGE UP
CREAT SERPL-MCNC: 1.04 MG/DL — SIGNIFICANT CHANGE UP (ref 0.5–1.3)
CREAT SERPL-MCNC: 1.38 MG/DL — HIGH (ref 0.5–1.3)
DIFF PNL FLD: NEGATIVE — SIGNIFICANT CHANGE UP
EGFR: 40 ML/MIN/1.73M2 — LOW
EGFR: 56 ML/MIN/1.73M2 — LOW
EOSINOPHIL # BLD AUTO: 0.01 K/UL — SIGNIFICANT CHANGE UP (ref 0–0.5)
EOSINOPHIL NFR BLD AUTO: 0 % — SIGNIFICANT CHANGE UP (ref 0–6)
FLUAV AG NPH QL: SIGNIFICANT CHANGE UP
FLUBV AG NPH QL: SIGNIFICANT CHANGE UP
GLUCOSE SERPL-MCNC: 130 MG/DL — HIGH (ref 70–99)
GLUCOSE SERPL-MCNC: 134 MG/DL — HIGH (ref 70–99)
GLUCOSE UR QL: NEGATIVE MG/DL — SIGNIFICANT CHANGE UP
HCT VFR BLD CALC: 19.9 % — CRITICAL LOW (ref 34.5–45)
HCT VFR BLD CALC: 21.8 % — LOW (ref 34.5–45)
HGB BLD-MCNC: 5.8 G/DL — CRITICAL LOW (ref 11.5–15.5)
HGB BLD-MCNC: 6.3 G/DL — CRITICAL LOW (ref 11.5–15.5)
HYPOCHROMIA BLD QL: SIGNIFICANT CHANGE UP
IMM GRANULOCYTES NFR BLD AUTO: 0.9 % — SIGNIFICANT CHANGE UP (ref 0–0.9)
INR BLD: 2.26 RATIO — HIGH (ref 0.85–1.16)
KETONES UR-MCNC: ABNORMAL MG/DL
LACTATE SERPL-SCNC: 2.5 MMOL/L — HIGH (ref 0.7–2)
LACTATE SERPL-SCNC: 3.6 MMOL/L — HIGH (ref 0.7–2)
LACTATE SERPL-SCNC: 3.7 MMOL/L — HIGH (ref 0.7–2)
LEUKOCYTE ESTERASE UR-ACNC: ABNORMAL
LYMPHOCYTES # BLD AUTO: 0.48 K/UL — LOW (ref 1–3.3)
LYMPHOCYTES # BLD AUTO: 2.3 % — LOW (ref 13–44)
MANUAL SMEAR VERIFICATION: SIGNIFICANT CHANGE UP
MCHC RBC-ENTMCNC: 25 PG — LOW (ref 27–34)
MCHC RBC-ENTMCNC: 25.3 PG — LOW (ref 27–34)
MCHC RBC-ENTMCNC: 28.9 G/DL — LOW (ref 32–36)
MCHC RBC-ENTMCNC: 29.1 G/DL — LOW (ref 32–36)
MCV RBC AUTO: 85.8 FL — SIGNIFICANT CHANGE UP (ref 80–100)
MCV RBC AUTO: 87.6 FL — SIGNIFICANT CHANGE UP (ref 80–100)
MONOCYTES # BLD AUTO: 0.92 K/UL — HIGH (ref 0–0.9)
MONOCYTES NFR BLD AUTO: 4.3 % — SIGNIFICANT CHANGE UP (ref 2–14)
NEUTROPHILS # BLD AUTO: 19.59 K/UL — HIGH (ref 1.8–7.4)
NEUTROPHILS NFR BLD AUTO: 92.2 % — HIGH (ref 43–77)
NITRITE UR-MCNC: POSITIVE
NT-PROBNP SERPL-SCNC: HIGH PG/ML (ref 0–450)
PH UR: 5 — SIGNIFICANT CHANGE UP (ref 5–8)
PLAT MORPH BLD: NORMAL — SIGNIFICANT CHANGE UP
PLATELET # BLD AUTO: 277 K/UL — SIGNIFICANT CHANGE UP (ref 150–400)
PLATELET # BLD AUTO: 441 K/UL — HIGH (ref 150–400)
POTASSIUM SERPL-MCNC: 3.6 MMOL/L — SIGNIFICANT CHANGE UP (ref 3.5–5.3)
POTASSIUM SERPL-MCNC: 4.1 MMOL/L — SIGNIFICANT CHANGE UP (ref 3.5–5.3)
POTASSIUM SERPL-SCNC: 3.6 MMOL/L — SIGNIFICANT CHANGE UP (ref 3.5–5.3)
POTASSIUM SERPL-SCNC: 4.1 MMOL/L — SIGNIFICANT CHANGE UP (ref 3.5–5.3)
PROT SERPL-MCNC: 6.5 GM/DL — SIGNIFICANT CHANGE UP (ref 6–8.3)
PROT SERPL-MCNC: 7.7 GM/DL — SIGNIFICANT CHANGE UP (ref 6–8.3)
PROT UR-MCNC: NEGATIVE MG/DL — SIGNIFICANT CHANGE UP
PROTHROM AB SERPL-ACNC: 25.8 SEC — HIGH (ref 9.9–13.4)
RBC # BLD: 2.32 M/UL — LOW (ref 3.8–5.2)
RBC # BLD: 2.49 M/UL — LOW (ref 3.8–5.2)
RBC # FLD: 17.7 % — HIGH (ref 10.3–14.5)
RBC # FLD: 19.8 % — HIGH (ref 10.3–14.5)
RBC BLD AUTO: ABNORMAL
RBC CASTS # UR COMP ASSIST: 1 /HPF — SIGNIFICANT CHANGE UP (ref 0–4)
RSV RNA NPH QL NAA+NON-PROBE: SIGNIFICANT CHANGE UP
SARS-COV-2 RNA SPEC QL NAA+PROBE: SIGNIFICANT CHANGE UP
SODIUM SERPL-SCNC: 135 MMOL/L — SIGNIFICANT CHANGE UP (ref 135–145)
SODIUM SERPL-SCNC: 137 MMOL/L — SIGNIFICANT CHANGE UP (ref 135–145)
SP GR SPEC: 1.02 — SIGNIFICANT CHANGE UP (ref 1–1.03)
SQUAMOUS # UR AUTO: 1 /HPF — SIGNIFICANT CHANGE UP (ref 0–5)
TROPONIN I, HIGH SENSITIVITY RESULT: 174.85 NG/L — HIGH
UROBILINOGEN FLD QL: 0.2 MG/DL — SIGNIFICANT CHANGE UP (ref 0.2–1)
WBC # BLD: 16.05 K/UL — HIGH (ref 3.8–10.5)
WBC # BLD: 21.26 K/UL — HIGH (ref 3.8–10.5)
WBC # FLD AUTO: 16.05 K/UL — HIGH (ref 3.8–10.5)
WBC # FLD AUTO: 21.26 K/UL — HIGH (ref 3.8–10.5)
WBC UR QL: 158 /HPF — HIGH (ref 0–5)

## 2025-01-22 PROCEDURE — 80048 BASIC METABOLIC PNL TOTAL CA: CPT

## 2025-01-22 PROCEDURE — 86900 BLOOD TYPING SEROLOGIC ABO: CPT

## 2025-01-22 PROCEDURE — 82803 BLOOD GASES ANY COMBINATION: CPT

## 2025-01-22 PROCEDURE — 99291 CRITICAL CARE FIRST HOUR: CPT

## 2025-01-22 PROCEDURE — 71045 X-RAY EXAM CHEST 1 VIEW: CPT | Mod: 26

## 2025-01-22 PROCEDURE — 71045 X-RAY EXAM CHEST 1 VIEW: CPT

## 2025-01-22 PROCEDURE — 83880 ASSAY OF NATRIURETIC PEPTIDE: CPT

## 2025-01-22 PROCEDURE — 93325 DOPPLER ECHO COLOR FLOW MAPG: CPT

## 2025-01-22 PROCEDURE — 86901 BLOOD TYPING SEROLOGIC RH(D): CPT

## 2025-01-22 PROCEDURE — 83735 ASSAY OF MAGNESIUM: CPT

## 2025-01-22 PROCEDURE — 86923 COMPATIBILITY TEST ELECTRIC: CPT

## 2025-01-22 PROCEDURE — 36569 INSJ PICC 5 YR+ W/O IMAGING: CPT

## 2025-01-22 PROCEDURE — 74174 CTA ABD&PLVS W/CONTRAST: CPT | Mod: 26

## 2025-01-22 PROCEDURE — 93308 TTE F-UP OR LMTD: CPT

## 2025-01-22 PROCEDURE — C1751: CPT

## 2025-01-22 PROCEDURE — 84100 ASSAY OF PHOSPHORUS: CPT

## 2025-01-22 PROCEDURE — 93010 ELECTROCARDIOGRAM REPORT: CPT

## 2025-01-22 PROCEDURE — 85027 COMPLETE CBC AUTOMATED: CPT

## 2025-01-22 PROCEDURE — 36430 TRANSFUSION BLD/BLD COMPNT: CPT

## 2025-01-22 PROCEDURE — 85610 PROTHROMBIN TIME: CPT

## 2025-01-22 PROCEDURE — 82550 ASSAY OF CK (CPK): CPT

## 2025-01-22 PROCEDURE — 36415 COLL VENOUS BLD VENIPUNCTURE: CPT

## 2025-01-22 PROCEDURE — 73706 CT ANGIO LWR EXTR W/O&W/DYE: CPT | Mod: 26,50,59

## 2025-01-22 PROCEDURE — 84484 ASSAY OF TROPONIN QUANT: CPT

## 2025-01-22 PROCEDURE — 97530 THERAPEUTIC ACTIVITIES: CPT | Mod: GP

## 2025-01-22 PROCEDURE — 93321 DOPPLER ECHO F-UP/LMTD STD: CPT

## 2025-01-22 PROCEDURE — 83605 ASSAY OF LACTIC ACID: CPT

## 2025-01-22 PROCEDURE — 85730 THROMBOPLASTIN TIME PARTIAL: CPT

## 2025-01-22 PROCEDURE — 80053 COMPREHEN METABOLIC PANEL: CPT

## 2025-01-22 PROCEDURE — 82962 GLUCOSE BLOOD TEST: CPT

## 2025-01-22 PROCEDURE — 99222 1ST HOSP IP/OBS MODERATE 55: CPT | Mod: 24

## 2025-01-22 PROCEDURE — 85025 COMPLETE CBC W/AUTO DIFF WBC: CPT

## 2025-01-22 PROCEDURE — 99223 1ST HOSP IP/OBS HIGH 75: CPT

## 2025-01-22 PROCEDURE — 97116 GAIT TRAINING THERAPY: CPT | Mod: GP

## 2025-01-22 PROCEDURE — 97163 PT EVAL HIGH COMPLEX 45 MIN: CPT | Mod: GP

## 2025-01-22 PROCEDURE — P9016: CPT

## 2025-01-22 PROCEDURE — 87040 BLOOD CULTURE FOR BACTERIA: CPT

## 2025-01-22 PROCEDURE — 86850 RBC ANTIBODY SCREEN: CPT

## 2025-01-22 RX ORDER — MORPHINE SULFATE 60 MG/1
4 TABLET, FILM COATED, EXTENDED RELEASE ORAL ONCE
Refills: 0 | Status: DISCONTINUED | OUTPATIENT
Start: 2025-01-22 | End: 2025-01-22

## 2025-01-22 RX ORDER — VANCOMYCIN HYDROCHLORIDE 50 MG/ML
1000 KIT ORAL ONCE
Refills: 0 | Status: DISCONTINUED | OUTPATIENT
Start: 2025-01-22 | End: 2025-01-22

## 2025-01-22 RX ORDER — ATORVASTATIN CALCIUM 80 MG/1
10 TABLET, FILM COATED ORAL AT BEDTIME
Refills: 0 | Status: DISCONTINUED | OUTPATIENT
Start: 2025-01-22 | End: 2025-02-04

## 2025-01-22 RX ORDER — VANCOMYCIN HYDROCHLORIDE 50 MG/ML
1250 KIT ORAL ONCE
Refills: 0 | Status: COMPLETED | OUTPATIENT
Start: 2025-01-22 | End: 2025-01-22

## 2025-01-22 RX ORDER — IRON/FOLIC ACID/C/B6/B12/ZINC 150-1.25MG
1 TABLET ORAL DAILY
Refills: 0 | Status: DISCONTINUED | OUTPATIENT
Start: 2025-01-22 | End: 2025-02-04

## 2025-01-22 RX ORDER — BACTERIOSTATIC SODIUM CHLORIDE 0.9 %
2100 VIAL (ML) INJECTION ONCE
Refills: 0 | Status: COMPLETED | OUTPATIENT
Start: 2025-01-22 | End: 2025-01-22

## 2025-01-22 RX ORDER — ACETAMINOPHEN 160 MG/5ML
650 SUSPENSION ORAL ONCE
Refills: 0 | Status: COMPLETED | OUTPATIENT
Start: 2025-01-22 | End: 2025-01-22

## 2025-01-22 RX ORDER — PANTOPRAZOLE 20 MG/1
40 TABLET, DELAYED RELEASE ORAL EVERY 12 HOURS
Refills: 0 | Status: DISCONTINUED | OUTPATIENT
Start: 2025-01-22 | End: 2025-02-04

## 2025-01-22 RX ORDER — ACETAMINOPHEN, DIPHENHYDRAMINE HCL, PHENYLEPHRINE HCL 325; 25; 5 MG/1; MG/1; MG/1
3 TABLET ORAL AT BEDTIME
Refills: 0 | Status: DISCONTINUED | OUTPATIENT
Start: 2025-01-22 | End: 2025-02-04

## 2025-01-22 RX ORDER — THIAMINE HCL 100 MG
100 TABLET ORAL DAILY
Refills: 0 | Status: DISCONTINUED | OUTPATIENT
Start: 2025-01-22 | End: 2025-02-04

## 2025-01-22 RX ORDER — CEFEPIME HCL 1 G
1000 IV SOLUTION, PIGGYBACK, BOTTLE (EA) INTRAVENOUS EVERY 8 HOURS
Refills: 0 | Status: DISCONTINUED | OUTPATIENT
Start: 2025-01-22 | End: 2025-01-23

## 2025-01-22 RX ORDER — MAGNESIUM, ALUMINUM HYDROXIDE 200-225/5
30 SUSPENSION, ORAL (FINAL DOSE FORM) ORAL EVERY 4 HOURS
Refills: 0 | Status: DISCONTINUED | OUTPATIENT
Start: 2025-01-22 | End: 2025-02-04

## 2025-01-22 RX ORDER — ONDANSETRON 4 MG/1
4 TABLET, ORALLY DISINTEGRATING ORAL EVERY 6 HOURS
Refills: 0 | Status: DISCONTINUED | OUTPATIENT
Start: 2025-01-22 | End: 2025-02-04

## 2025-01-22 RX ORDER — VANCOMYCIN HYDROCHLORIDE 50 MG/ML
1250 KIT ORAL EVERY 12 HOURS
Refills: 0 | Status: DISCONTINUED | OUTPATIENT
Start: 2025-01-22 | End: 2025-01-24

## 2025-01-22 RX ORDER — CEFEPIME HCL 1 G
1000 IV SOLUTION, PIGGYBACK, BOTTLE (EA) INTRAVENOUS EVERY 8 HOURS
Refills: 0 | Status: DISCONTINUED | OUTPATIENT
Start: 2025-01-22 | End: 2025-01-22

## 2025-01-22 RX ORDER — MORPHINE SULFATE 60 MG/1
2 TABLET, FILM COATED, EXTENDED RELEASE ORAL EVERY 4 HOURS
Refills: 0 | Status: DISCONTINUED | OUTPATIENT
Start: 2025-01-22 | End: 2025-01-22

## 2025-01-22 RX ORDER — ONDANSETRON 4 MG/1
4 TABLET, ORALLY DISINTEGRATING ORAL ONCE
Refills: 0 | Status: COMPLETED | OUTPATIENT
Start: 2025-01-22 | End: 2025-01-22

## 2025-01-22 RX ORDER — METRONIDAZOLE 500 MG
500 TABLET ORAL EVERY 12 HOURS
Refills: 0 | Status: DISCONTINUED | OUTPATIENT
Start: 2025-01-22 | End: 2025-01-23

## 2025-01-22 RX ORDER — AMIODARONE HYDROCHLORIDE 50 MG/ML
200 INJECTION, SOLUTION INTRAVENOUS DAILY
Refills: 0 | Status: DISCONTINUED | OUTPATIENT
Start: 2025-01-22 | End: 2025-02-04

## 2025-01-22 RX ORDER — MONTELUKAST SODIUM 5 MG/1
10 TABLET, CHEWABLE ORAL AT BEDTIME
Refills: 0 | Status: DISCONTINUED | OUTPATIENT
Start: 2025-01-22 | End: 2025-02-04

## 2025-01-22 RX ORDER — ACETAMINOPHEN 160 MG/5ML
1000 SUSPENSION ORAL ONCE
Refills: 0 | Status: COMPLETED | OUTPATIENT
Start: 2025-01-22 | End: 2025-01-22

## 2025-01-22 RX ORDER — ACETAMINOPHEN 160 MG/5ML
650 SUSPENSION ORAL EVERY 6 HOURS
Refills: 0 | Status: DISCONTINUED | OUTPATIENT
Start: 2025-01-22 | End: 2025-02-04

## 2025-01-22 RX ORDER — CEFTRIAXONE 250 MG/1
1000 INJECTION, POWDER, FOR SOLUTION INTRAMUSCULAR; INTRAVENOUS ONCE
Refills: 0 | Status: COMPLETED | OUTPATIENT
Start: 2025-01-22 | End: 2025-01-22

## 2025-01-22 RX ADMIN — ACETAMINOPHEN 650 MILLIGRAM(S): 160 SUSPENSION ORAL at 17:36

## 2025-01-22 RX ADMIN — ATORVASTATIN CALCIUM 10 MILLIGRAM(S): 80 TABLET, FILM COATED ORAL at 21:37

## 2025-01-22 RX ADMIN — Medication 100 MILLIGRAM(S): at 23:53

## 2025-01-22 RX ADMIN — PANTOPRAZOLE 40 MILLIGRAM(S): 20 TABLET, DELAYED RELEASE ORAL at 23:41

## 2025-01-22 RX ADMIN — Medication 100 MILLIGRAM(S): at 23:39

## 2025-01-22 RX ADMIN — AMIODARONE HYDROCHLORIDE 200 MILLIGRAM(S): 50 INJECTION, SOLUTION INTRAVENOUS at 23:40

## 2025-01-22 RX ADMIN — CEFTRIAXONE 1000 MILLIGRAM(S): 250 INJECTION, POWDER, FOR SOLUTION INTRAMUSCULAR; INTRAVENOUS at 12:15

## 2025-01-22 RX ADMIN — ACETAMINOPHEN 400 MILLIGRAM(S): 160 SUSPENSION ORAL at 11:45

## 2025-01-22 RX ADMIN — VANCOMYCIN HYDROCHLORIDE 166.67 MILLIGRAM(S): KIT at 12:16

## 2025-01-22 RX ADMIN — MORPHINE SULFATE 4 MILLIGRAM(S): 60 TABLET, FILM COATED, EXTENDED RELEASE ORAL at 14:16

## 2025-01-22 RX ADMIN — Medication 20 MILLIGRAM(S): at 19:06

## 2025-01-22 RX ADMIN — ACETAMINOPHEN 650 MILLIGRAM(S): 160 SUSPENSION ORAL at 20:15

## 2025-01-22 RX ADMIN — Medication 1 TABLET(S): at 23:39

## 2025-01-22 RX ADMIN — ONDANSETRON 4 MILLIGRAM(S): 4 TABLET, ORALLY DISINTEGRATING ORAL at 14:16

## 2025-01-22 RX ADMIN — Medication 40 MILLIGRAM(S): at 23:39

## 2025-01-22 RX ADMIN — Medication 2100 MILLILITER(S): at 11:25

## 2025-01-22 RX ADMIN — MONTELUKAST SODIUM 10 MILLIGRAM(S): 5 TABLET, CHEWABLE ORAL at 21:38

## 2025-01-22 NOTE — ED ADULT TRIAGE NOTE - CHIEF COMPLAINT QUOTE
Bleeding that does not stop/Swelling that gets worse/Pain not relieved by Medications/Fever greater than (need to indicate Fahrenheit or Celsius)/Numbness, tingling, color or temperature change to extremity/Nausea and vomiting that does not stop/Unable to urinate/Excessive diarrhea/Inability to tolerate liquids or foods/Increased irritability or sluggishness Pt brought to the ED from the wound center where she was to been seen for treatment and they did not like how she looked. Pt complains of fever, shortness of breath, chills, lethargy that worsened last night. While CNA was taking vitals she was found to have elevated HR and low O2 sat. PT taken directly to the trauma room for evaluation and treatment,

## 2025-01-22 NOTE — H&P ADULT - PROBLEM SELECTOR PLAN 5
-c/w Cardizem CD and amiodarone.   -Hold xarelto -In sinus rhythm here.   -C/w her amiodarone.   -Hold Cardizem, metoprolol and xarelto d/t soft BP and ABLA.  -Resume when appropriate.

## 2025-01-22 NOTE — ED ADULT NURSE NOTE - NSFALLHARMRISKINTERV_ED_ALL_ED

## 2025-01-22 NOTE — CONSULT NOTE ADULT - ASSESSMENT
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     PLAN  Admit to medicine for septic work up and anemia work up  Imaging reviewed, groin site collection decreasing in size 2 vessel run off  Vascular team will follow for wound management    Plan discussed with Dr. Manjarrez

## 2025-01-22 NOTE — ED ADULT NURSE NOTE - OBJECTIVE STATEMENT
Pt brought to the ED from the wound center where she was to been seen for treatment and they did not like how she looked. Pt complains of fever, shortness of breath, chills, lethargy that worsened last night. While CNA was taking vitals she was found to have elevated HR and low O2 sat. PT taken directly to the trauma room for evaluation and treatment, Pt brought to the ED from the wound center where she was to been seen for treatment and they did not like how she looked. Pt complains of fever, shortness of breath, chills, lethargy that worsened last night. While CNA was taking vitals she was found to have elevated HR and low O2 sat. PT taken directly to the trauma room for evaluation and treatment,on arrival pt had wound vac attached to her right groin.

## 2025-01-22 NOTE — ED ADULT NURSE REASSESSMENT NOTE - NS ED NURSE REASSESS COMMENT FT1
Dr. Patiño aware of rectal temp 100.6F. Per MD aware that ot received PRN tylenol and ok to give pt stat order of tylenol now as ordered in MAR.

## 2025-01-22 NOTE — ED PROVIDER NOTE - CARE PLAN
Principal Discharge DX:	Symptomatic anemia   1 Principal Discharge DX:	Symptomatic anemia  Secondary Diagnosis:	Urinary tract infection  Secondary Diagnosis:	Lower GI bleed

## 2025-01-22 NOTE — H&P ADULT - PROBLEM SELECTOR PLAN 4
-Last ECHO done on 12/05/2024: LVEF 56%, LV cavity moderately dilated, normal wall thickness, normal LVEF, severe (grade 3) LV diastolic dysfunction, severe LA dilatation, mod to moderate RA dilatation, normal RV size and function.   -no acute signs of HF at this time  -resume po lasix -and spironolactone  -c/w metoprolol ER with hold parameters   -Farxiga on hold resume at  discharge   -Monitor weights, strict ins and outs. -Last ECHO done on 12/05/2024: LVEF 56%, LV cavity moderately dilated, normal wall thickness, normal LVEF, severe (grade 3) LV diastolic dysfunction, severe LA dilatation, mod to moderate RA dilatation, normal RV size and function.   -She had mild tachypnea, with mild bibasilar crackles after 2L IVFs and 2 pRBCs, dyspnea improved with 1 dose of 20 mg IVP lasix.  -C/w her lasix, hold her metoprolol and aldactone d/t ABLA and soft BP.    -Monitor weights, strict ins and outs.

## 2025-01-22 NOTE — CONSULT NOTE ADULT - SUBJECTIVE AND OBJECTIVE BOX
HPI:  74-year-old female with past medical history of DM, TAVR 8/2024 on Plavix , A-fib on Xarelto, CHF, HLD, PVD, b/l LE venous insufficiency, bilateral CFA stenoses s/p Rt SFA endarterectomy on 12/17/24 complicated by postoperative seroma requiring irrigation and debridement and wound VAC placement Seen on January 18 for dislodgment of wound VAC tubing presents for evaluation of fever/chills and rigors since last night.  Initially was found to be tachycardic and triage stated that the patient was hypoxic but is currently 100% on nasal cannula.        Vascular surgery consulted for evaluation of at the wound VAC site. Some discomfort at the site, output was scant and serosanguinous.      PAST MEDICAL & SURGICAL HISTORY:  Diabetes mellitus type II, controlled      Atrial fibrillation      Congestive heart failure      Dyspnea      Morbid obesity with BMI of 40.0-44.9, adult      Neuropathy      Peripheral venous insufficiency      Thyroid nodule      HTN (hypertension)      Cellulitis      At risk for sleep apnea      History of esophagogastroduodenoscopy (EGD)      H/O colonoscopy      Other complications of gastric band procedure      S/P left knee arthroscopy      Status post medial meniscus repair      History of cholecystectomy      S/P cataract surgery          MEDICATIONS  (STANDING):  aMIOdarone    Tablet 200 milliGRAM(s) Oral daily  atorvastatin 10 milliGRAM(s) Oral at bedtime  montelukast 10 milliGRAM(s) Oral at bedtime  multivitamin/minerals 1 Tablet(s) Oral daily  pantoprazole  Injectable 40 milliGRAM(s) IV Push every 12 hours  thiamine 100 milliGRAM(s) Oral daily    MEDICATIONS  (PRN):  acetaminophen     Tablet .. 650 milliGRAM(s) Oral every 6 hours PRN Temp greater or equal to 38C (100.4F)  aluminum hydroxide/magnesium hydroxide/simethicone Suspension 30 milliLiter(s) Oral every 4 hours PRN Dyspepsia  melatonin 3 milliGRAM(s) Oral at bedtime PRN Insomnia  morphine  - Injectable 2 milliGRAM(s) IV Push every 4 hours PRN Severe Pain (7 - 10)  ondansetron Injectable 4 milliGRAM(s) IV Push every 6 hours PRN Nausea and/or Vomiting      Allergies    pregabalin (Unknown)  latex (Unknown)  penicillin (Hives; Urticaria)  PC Pen VK (Rash)    Intolerances        SOCIAL HISTORY:    FAMILY HISTORY:  Family history of breast cancer in mother    Family history of diabetes mellitus in mother    FHx: heart disease (Sibling)            Physical Exam:  Gen: well-appearing, in no acute distress  CV: pulse regularly present   Resp: airway patent, non-labored breathing  Abd: soft, NTND; no rebound or guarding. R groin WV in situ   Ext. no cyanosis or edema  Pulses: Palpable bilateral fem, doppler signals pop and dp bilaterally       Vital Signs Last 24 Hrs  T(C): 38.1 (22 Jan 2025 19:45), Max: 39.5 (22 Jan 2025 12:01)  T(F): 100.6 (22 Jan 2025 19:45), Max: 103.1 (22 Jan 2025 12:01)  HR: 63 (22 Jan 2025 20:45) (63 - 82)  BP: 109/63 (22 Jan 2025 20:45) (96/43 - 136/98)  BP(mean): 75 (22 Jan 2025 20:45) (63 - 112)  RR: 17 (22 Jan 2025 20:45) (17 - 26)  SpO2: 100% (22 Jan 2025 20:45) (97% - 100%)    Parameters below as of 22 Jan 2025 20:45  Patient On (Oxygen Delivery Method): nasal cannula  O2 Flow (L/min): 2      I&O's Summary    22 Jan 2025 07:01  -  22 Jan 2025 21:15  --------------------------------------------------------  IN: 347 mL / OUT: 0 mL / NET: 347 mL            LABS:                        5.8    21.26 )-----------( 441      ( 22 Jan 2025 11:22 )             19.9     01-22    135  |  105  |  28[H]  ----------------------------<  130[H]  4.1   |  23  |  1.04    Ca    8.9      22 Jan 2025 11:22    TPro  7.7  /  Alb  3.1[L]  /  TBili  0.6  /  DBili  x   /  AST  10[L]  /  ALT  <6[L]  /  AlkPhos  74  01-22    PT/INR - ( 22 Jan 2025 11:22 )   PT: 25.8 sec;   INR: 2.26 ratio         PTT - ( 22 Jan 2025 11:22 )  PTT:33.6 sec  Urinalysis Basic - ( 22 Jan 2025 11:22 )    Color: x / Appearance: x / SG: x / pH: x  Gluc: 130 mg/dL / Ketone: x  / Bili: x / Urobili: x   Blood: x / Protein: x / Nitrite: x   Leuk Esterase: x / RBC: x / WBC x   Sq Epi: x / Non Sq Epi: x / Bacteria: x      CAPILLARY BLOOD GLUCOSE        LIVER FUNCTIONS - ( 22 Jan 2025 11:22 )  Alb: 3.1 g/dL / Pro: 7.7 gm/dL / ALK PHOS: 74 U/L / ALT: <6 U/L / AST: 10 U/L / GGT: x             Cultures:      RADIOLOGY & ADDITIONAL STUDIES:      < from: CT Angio Abdomen and Pelvis w/ IV Cont (01.22.25 @ 13:47) >  No lymphadenopathy.  ABDOMINAL WALL: Status post I&D of the right groin with a residual   tracking peripherally enhancing 5.3 x 2.4 cm collection in the  subcutaneous tissues. Fat-containing umbilical hernia.  BONES: There is moderate soft tissue swelling of the dorsum of the left   foot. No subcutaneous gas is identified. Degenerative disc disease and   spondylosis of the lumbar spine.    < from: CT Angio Abdomen and Pelvis w/ IV Cont (01.22.25 @ 13:47) >  Extensive atherosclerotic disease of the nonaneurysmal abdominal   aorta. Status post endarterectomy of the right proximal focal femoral   artery.    On the right, there is moderate stenosis of the proximal common iliac   artery, severe stenosis of the common femoral artery, and diffuse   multifocal moderate to severe stenosis of the superficial femoral artery,   popliteal artery, trifurcation, and calf arteries. There is diminutive   two-vessel runoff to the foot with an occluded posterior tibial artery.    On the left, there is moderate stenosis of the proximal common iliac   artery, common femoral artery, and diffuse multifocal moderate stenosis   of the superficial femoral artery, popliteal artery, trifurcation, and   calf arteries. There is diminutive two-vessel runoff to the foot with an   occluded posterior tibial artery.    The IVC and pelvic veins opacify unremarkably.    < end of copied text >    < end of copied text >

## 2025-01-22 NOTE — CONSULT NOTE ADULT - ATTENDING COMMENTS
seen and examined she is known to the vascular surgery service   s/p right femoral endarterectomy with bovine patch and followed by drainage of seroma and wound debridement (cultures at that time were negative, e fecalis likely contaminant from skin)   she presents with severe anemia (unclear source no bleeding from groin and prior to discharge Hgb >8)  bacteremia from UTI  will monitor removed vac will do daily wet to dry

## 2025-01-22 NOTE — H&P ADULT - NSHPPHYSICALEXAM_GEN_ALL_CORE
T(C): 38 (01-22-25 @ 18:05), Max: 39.5 (01-22-25 @ 12:01)  HR: 72 (01-22-25 @ 18:05) (72 - 82)  BP: 102/85 (01-22-25 @ 18:05) (96/43 - 136/98)  RR: 17 (01-22-25 @ 18:05) (17 - 26)  SpO2: 100% (01-22-25 @ 18:05) (97% - 100%)    General: Ill appearing  HEENT: non-traumatic, perrla, eomi  Cardio: s1s2 regular rate and rhythm  Lungs: comfortable breathing, clear to auscultation  Abdomen: Soft, non-tender, non-distended  Neuro: AOx4  Ext: Pulses +2

## 2025-01-22 NOTE — ED ADULT NURSE NOTE - CHIEF COMPLAINT QUOTE
Pt brought to the ED from the wound center where she was to been seen for treatment and they did not like how she looked. Pt complains of fever, shortness of breath, chills, lethargy that worsened last night. While CNA was taking vitals she was found to have elevated HR and low O2 sat. PT taken directly to the trauma room for evaluation and treatment,

## 2025-01-22 NOTE — H&P ADULT - HISTORY OF PRESENT ILLNESS
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  Initially was found to be tachycardic and triage stated that the patient was hypoxic but is currently 100% on nasal cannula.  Patient notes discomfort at the wound VAC site.    In ED patient febrile with Tmax of 101.9, Soft BP in 90s/40-50s, HR in 70s. CBC noted for WBC 21, H/H 5.8/19, Plt 441. CMP noted for BUN/Cr 28/1.04. Lactate 2.5 > 3.7. F/u/COVID negative. UA cloudy, with trace ketones, mod leuks, pos nitrites, 158 wbcs. Guaiac positive. CTA of abdomen and b/l LE negative for acute hemorrhage. CXR read as "grossly clear lungs". Patient met sepsis criteria, cultures obtained and patient recieved broad spectrum antibiotics with vancomycin and ceftriaxone in ED. Patient also recieved 2L NS and ordered for 2 U pRBCs. Patient admitted to  for sepsis and symptomatic anemia.  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 Patient reports she had felt like her usual self since her last discharge until today. She had acute onset subjective fevers, chills, rigors d/t which she came to ED. She was discharged from  on 1/18 with a course of ciprofloxacin which she reports being compliant with, reports having "5 pills" left in the course. She denies any N/V, cough, sore throat, rhinitis, chest pain, rash, diarrhea or dysuria.   In ED patient febrile with Tmax of 101.9, Soft BP in 90s/40-50s, HR in 70s. CBC noted for WBC 21, H/H 5.8/19, Plt 441. CMP noted for BUN/Cr 28/1.04. Lactate 2.5 > 3.7. F/u/COVID negative. UA cloudy, with trace ketones, mod leuks, pos nitrites, 158 wbcs. Guaiac positive. CTA of abdomen and b/l LE negative for acute hemorrhage. CXR read as "grossly clear lungs". Patient met sepsis criteria, cultures obtained and patient recieved broad spectrum antibiotics with vancomycin and ceftriaxone in ED. Patient also recieved 2L NS and ordered for 2 U pRBCs. Patient admitted to  for sepsis and symptomatic anemia.

## 2025-01-22 NOTE — ED PROVIDER NOTE - CPE EDP CARDIAC NORM
Spoke with patient re:[employer]. She states she is up to \"level 5\" and not having any discomfort.  She states  said that she is no longer snoring.  Compliance report in chart for review.   
- - -

## 2025-01-22 NOTE — H&P ADULT - PROBLEM SELECTOR PLAN 1
-C/w broad spectrum abx  -F/u cultures  -ID consult. -Consulted ICU, patient deemed stable for tele floor.   -C/w broad spectrum abx - vanc/radha  -F/u cultures  -ID consult.

## 2025-01-22 NOTE — ED PROVIDER NOTE - PROGRESS NOTE DETAILS
Rectal exam: Light brown stool that is heme positive Lot #[id number], expiration date March 31, 2025, positive QC

## 2025-01-22 NOTE — H&P ADULT - NSHPLABSRESULTS_GEN_ALL_CORE
--------------- APPROVED REPORT --------------





Date of service: 02/25/2019



EXAM: Two-dimensional and M-mode echocardiogram with Doppler and 

color Doppler.



Other Information 

Quality : AverageRhythm : NSR



INDICATION

Murmur 



Surgery/Intervention

Status/Post Mitral Valve Replacement: 



2D DIMENSIONS 

IVSd1.41   (0.7-1.1cm)LVDd2.57   (3.9-5.9cm)

LVOT Diameter1.82   (1.8-2.4cm)PWd1.50   (0.7-1.1cm)

IVSs1.69   (0.8-1.2cm)LVDs1.97   (2.5-4.0cm)

FS (%) 23.3   %PWs1.73   (0.8-1.2cm)



M-Mode DIMENSIONS 

Left Atrium (MM)4.94   (2.5-4.0cm)IVSd0.79   (0.7-1.1cm)

Aortic Root2.91   (2.2-3.7cm)LVDd4.09   (4.0-5.6cm)

Aortic Cusp Exc.1.41   (1.5-2.0cm)PWd1.44   (0.7-1.1cm)

IVSs1.29   cmFS (%) 19   %

LVDs3.32   (2.0-3.8cm)PWs2.24   cm



Aortic Valve

AoV Peak Voywhttt340.2cm/sAoV VTI55.3cmAO Peak GR.41mmHg

AO Mean GR.22mmHg



Mitral Valve

E/A ratio0.0



TDI

E/Lateral E'0.0E/Medial E'0.0



Tricuspid Valve

TR Peak [id number]/sRAP [id number] Peak Gr.43mmHg

[id number]



 LEFT VENTRICLE 

The left ventricle is normal size.

There is mild concentric left ventricular hypertrophy.

The left ventricular systolic function is normal.

The estimated ejection fraction is 50-55%

No regional wall motion abnormalities noted..

The left ventricular diastolic function cannot be assessed due to 

underlying atrial fibrillaiton. 

No left ventricle thrombus noted on this study.

There is no ventricular septal defect visualized.

There is no left ventricular aneurysm.

There is no mass noted in the left ventricle.



 RIGHT VENTRICLE 

The right ventricle is normal size.

There is normal right ventricular wall thickness.

The right ventricular systolic function is normal.



 ATRIA 

The left atrium is severely dilated.

The right atrium size is dilated. 

The interatrial septum is intact with no evidence for an atrial 

septal defect.



 AORTIC VALVE 

The aortic valve is normal in structure.

Moderate aortic regurgitation is present.

There is no aortic valvular stenosis. There is subvalvular aortic 

stenosis, from mitral prosthetic valve and LVH, with peak velocity - 

4.0 m/sec. Consider VIVIENNE to define the LVOT. 

There is no aortic valvular vegetation.



 MITRAL VALVE 

There is prosthetic mitral valve with slightly increased inflow 

velocities across the valve. 

There is no evidence of mitral valve prolapse.

There is no significant mitral valve stenosis.

There is no mitral valve regurgitation noted.



 TRICUSPID VALVE 

The tricuspid valve is normal in structure.

There is moderate tricuspid valve regurgitation noted. RVSP is 

calculated at 49 mm Hg. 

There is no tricuspid valve prolapse or vegetation.

There is no tricuspid valve stenosis.



 PULMONIC VALVE 

The pulmonary valve is normal in structure.

There is no pulmonic valvular regurgitation.

There is no pulmonic valvular stenosis.



 GREAT VESSELS 

The aortic root is normal in size.

The ascending aorta is normal in size.

The pulmonary artery is normal.

The IVC is normal in size and collapses >50% with inspiration.



 PERICARDIAL EFFUSION 

There is no pericardial effusion.

There is no pleural effusion.



<Conclusion>

There is mild concentric left ventricular hypertrophy.

The estimated ejection fraction is 50-55%

The left ventricular diastolic function cannot be assessed due to 

underlying atrial fibrillaiton.

The left atrium is severely dilated.

The right atrium size is dilated.

Moderate aortic regurgitation is present.

There is no aortic valvular stenosis. There is subvalvular aortic 

stenosis, from mitral prosthetic valve and LVH, with peak velocity - 

4.0 m/sec. Consider VIVIENNE to better define the subvalvular stenosis if 

clinically indicated. 

There is prosthetic mitral valve with slightly increased inflow 

velocities across the valve.

There is moderate tricuspid valve regurgitation noted. RVSP is 

calculated at 49 mm Hg.

The IVC is normal in size and collapses >50% with inspiration. LABS:  cret                        5.8    21.26 )-----------( 441      ( 22 Jan 2025 11:22 )             19.9     01-22    135  |  105  |  28[H]  ----------------------------<  130[H]  4.1   |  23  |  1.04    Ca    8.9      22 Jan 2025 11:22    TPro  7.7  /  Alb  3.1[L]  /  TBili  0.6  /  DBili  x   /  AST  10[L]  /  ALT  <6[L]  /  AlkPhos  74  01-22    PT/INR - ( 22 Jan 2025 11:22 )   PT: 25.8 sec;   INR: 2.26 ratio         PTT - ( 22 Jan 2025 11:22 )  PTT:33.6 sec      Urinalysis with Rflx Culture (collected 01-22-25 @ 11:20)    < from: CT Angio Abdomen and Pelvis w/ IV Cont (01.22.25 @ 13:47) >    IMPRESSION: No evidence of acute hemorrhage in the abdomen, pelvis, and   bilateral lower extremities.. Status post I&D of right groin collection.    --- End of Report ---    < end of copied text >    < from: Xray Chest 1 View-PORTABLE IMMEDIATE (01.22.25 @ 12:47) >    IMPRESSION: Grossly clear lungs at this time.    --- End of Report ---    < end of copied text >

## 2025-01-22 NOTE — H&P ADULT - PROBLEM SELECTOR PLAN 6
-Last A1c 5.8 (4/2024)  -Not on any medications.   -NPO for now pending GI eval.   -Carb restricted diet when appropriate.

## 2025-01-22 NOTE — ED PROVIDER NOTE - CLINICAL SUMMARY MEDICAL DECISION MAKING FREE TEXT BOX
Sepsis - undifferentiated.  (Other DDx includes: PE, adrenal insufficiency, DKA, pancreatitis, anaphylaxis, SBO/LBO, hypovolemia/severe anemia, colitis, vasculitis, tox or w/d depending on clinical context & hx)  Plan: 1) D-stick/rectal temp/IV Access/IVF/Labs/UA/BCx & UCx.  2) CXR.  3) IV Abx.  4) Frequent reassessment - if no improvement, consider expanding ABx, pressors, echo, CT, LP or nonifectious etiology (above).  5) ICU if poor responce to therapy or persistently unstable VS.  6) Admit    Patient noted to have significant drop in H&H.  Will get CTA of the abdomen and pelvis and CTA of the right lower extremity.  Will consult with vascular surgery regarding possible postoperative complications.  Patient to receive 2 units of PRBCs.  Patient given broad-spectrum antibiotics.  Patient to be admitted

## 2025-01-22 NOTE — ED PROVIDER NOTE - OBJECTIVE STATEMENT
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  Initially was found to be tachycardic and triage stated that the patient was hypoxic but is currently 100% on nasal cannula.  Patient notes discomfort at the wound VAC site.

## 2025-01-22 NOTE — ED PROVIDER NOTE - PHYSICAL EXAMINATION
Wound VAC in right anterior thigh without surrounding induration or erythema.  There is tenderness to palpation

## 2025-01-22 NOTE — H&P ADULT - PROBLEM SELECTOR PLAN 2
-Patient on anticoagulation for Afib, with guaiac positive test. R/o GIB.  -Start protonix BID  -Keep NPO except for meds.  -GI consult. -Patient on anticoagulation for Afib, with guaiac positive test. R/o GIB.  -Start protonix BID  -Hold her xarelto, plavix d/t suspected ABLA  -Hold her metoprolol, cardizem, and aldactone d/t doft BP.   -Keep NPO except for meds.  -GI consult.

## 2025-01-22 NOTE — ED ADULT NURSE REASSESSMENT NOTE - NS ED NURSE REASSESS COMMENT FT1
Received pt from previous RN Raj. Pt A&Ox4, denies any pain or discomfort. Pt's VS as charted, respirations equal and unlabored. Dr. Patiño aware and at bedside with patient. Plan is to repeat labs and check hgb levels. Safety measures maintained, stretcher locked and in lowest position, both side rails up.

## 2025-01-22 NOTE — ED ADULT NURSE REASSESSMENT NOTE - NS ED NURSE REASSESS COMMENT FT1
Notified Dr Patiño regarding Lactate of 3.7.MD aware will come to see pt no additional order received.

## 2025-01-22 NOTE — H&P ADULT - PROBLEM SELECTOR PLAN 3
-S/p wound debridement w penrose drain and wound vac placement on 1/8/25 replaced 1/11.  -Sepsis work up obtained and s/p vancomycin and ceftriaxone in ED.   -C/w broad spectrum antibiotics  -Consult vascular and ID. -S/p wound debridement w penrose drain and wound vac placement on 1/8/25 replaced 1/11.  -Sepsis work up obtained and s/p vancomycin and ceftriaxone in ED.   -C/w broad spectrum antibiotics -S/p wound debridement w penrose drain and wound vac placement on 1/8/25 replaced 1/11.  -Sepsis work up obtained and s/p vancomycin and ceftriaxone in ED.   -C/w broad spectrum antibiotics  -PRN oxycodone for pain control

## 2025-01-22 NOTE — ED PROVIDER NOTE - DISPOSITION TYPE
[FreeTextEntry1] : Location: back\par Severity: severe lately\par Duration: over 3 yr\par Timing: chronic\par Aggravating Factors: bending, walking, moving\par Alleviating Factors: not much\par Associated Symptoms: denies weight loss, fever, chills, change in bowel/bladder habits, redness, warmth, weakness, numbness/tingling, +radiation down right leg\par Prior Studies: MRI
ADMIT

## 2025-01-23 ENCOUNTER — APPOINTMENT (OUTPATIENT)
Dept: VASCULAR SURGERY | Facility: CLINIC | Age: 75
End: 2025-01-23

## 2025-01-23 ENCOUNTER — RESULT REVIEW (OUTPATIENT)
Age: 75
End: 2025-01-23

## 2025-01-23 DIAGNOSIS — G47.00 INSOMNIA, UNSPECIFIED: ICD-10-CM

## 2025-01-23 LAB
-  CTX-M RESISTANCE MARKER: SIGNIFICANT CHANGE UP
-  ESBL: SIGNIFICANT CHANGE UP
ALBUMIN SERPL ELPH-MCNC: 2.6 G/DL — LOW (ref 3.3–5)
ALP SERPL-CCNC: 61 U/L — SIGNIFICANT CHANGE UP (ref 40–120)
ALT FLD-CCNC: 9 U/L — LOW (ref 12–78)
ANION GAP SERPL CALC-SCNC: 5 MMOL/L — SIGNIFICANT CHANGE UP (ref 5–17)
AST SERPL-CCNC: 37 U/L — SIGNIFICANT CHANGE UP (ref 15–37)
BASE EXCESS BLDV CALC-SCNC: -3.6 MMOL/L — LOW (ref -2–3)
BASE EXCESS BLDV CALC-SCNC: -5.4 MMOL/L — LOW (ref -2–3)
BASOPHILS # BLD AUTO: 0.09 K/UL — SIGNIFICANT CHANGE UP (ref 0–0.2)
BASOPHILS NFR BLD AUTO: 0.5 % — SIGNIFICANT CHANGE UP (ref 0–2)
BILIRUB SERPL-MCNC: 1 MG/DL — SIGNIFICANT CHANGE UP (ref 0.2–1.2)
BUN SERPL-MCNC: 25 MG/DL — HIGH (ref 7–23)
CALCIUM SERPL-MCNC: 8.3 MG/DL — LOW (ref 8.5–10.1)
CHLORIDE SERPL-SCNC: 105 MMOL/L — SIGNIFICANT CHANGE UP (ref 96–108)
CO2 SERPL-SCNC: 22 MMOL/L — SIGNIFICANT CHANGE UP (ref 22–31)
CREAT SERPL-MCNC: 1.21 MG/DL — SIGNIFICANT CHANGE UP (ref 0.5–1.3)
E COLI DNA BLD POS QL NAA+NON-PROBE: SIGNIFICANT CHANGE UP
EGFR: 47 ML/MIN/1.73M2 — LOW
EOSINOPHIL # BLD AUTO: 0.53 K/UL — HIGH (ref 0–0.5)
EOSINOPHIL NFR BLD AUTO: 3 % — SIGNIFICANT CHANGE UP (ref 0–6)
GAS PNL BLDV: SIGNIFICANT CHANGE UP
GAS PNL BLDV: SIGNIFICANT CHANGE UP
GLUCOSE SERPL-MCNC: 96 MG/DL — SIGNIFICANT CHANGE UP (ref 70–99)
GRAM STN FLD: ABNORMAL
HCO3 BLDV-SCNC: 22 MMOL/L — SIGNIFICANT CHANGE UP (ref 22–29)
HCO3 BLDV-SCNC: 24 MMOL/L — SIGNIFICANT CHANGE UP (ref 22–29)
HCT VFR BLD CALC: 25.7 % — LOW (ref 34.5–45)
HGB BLD-MCNC: 7.9 G/DL — LOW (ref 11.5–15.5)
IMM GRANULOCYTES NFR BLD AUTO: 0.5 % — SIGNIFICANT CHANGE UP (ref 0–0.9)
LACTATE SERPL-SCNC: 1.6 MMOL/L — SIGNIFICANT CHANGE UP (ref 0.7–2)
LACTATE SERPL-SCNC: 2.7 MMOL/L — HIGH (ref 0.7–2)
LYMPHOCYTES # BLD AUTO: 0.59 K/UL — LOW (ref 1–3.3)
LYMPHOCYTES # BLD AUTO: 3.4 % — LOW (ref 13–44)
MAGNESIUM SERPL-MCNC: 1.7 MG/DL — SIGNIFICANT CHANGE UP (ref 1.6–2.6)
MCHC RBC-ENTMCNC: 26.8 PG — LOW (ref 27–34)
MCHC RBC-ENTMCNC: 30.7 G/DL — LOW (ref 32–36)
MCV RBC AUTO: 87.1 FL — SIGNIFICANT CHANGE UP (ref 80–100)
METHOD TYPE: SIGNIFICANT CHANGE UP
MONOCYTES # BLD AUTO: 1.4 K/UL — HIGH (ref 0–0.9)
MONOCYTES NFR BLD AUTO: 8 % — SIGNIFICANT CHANGE UP (ref 2–14)
NEUTROPHILS # BLD AUTO: 14.76 K/UL — HIGH (ref 1.8–7.4)
NEUTROPHILS NFR BLD AUTO: 84.6 % — HIGH (ref 43–77)
PCO2 BLDV: 47 MMHG — HIGH (ref 39–42)
PCO2 BLDV: 54 MMHG — HIGH (ref 39–42)
PH BLDV: 7.26 — LOW (ref 7.32–7.43)
PH BLDV: 7.27 — LOW (ref 7.32–7.43)
PHOSPHATE SERPL-MCNC: 4.3 MG/DL — SIGNIFICANT CHANGE UP (ref 2.5–4.5)
PLATELET # BLD AUTO: 286 K/UL — SIGNIFICANT CHANGE UP (ref 150–400)
PO2 BLDV: 45 MMHG — SIGNIFICANT CHANGE UP (ref 25–45)
PO2 BLDV: 82 MMHG — HIGH (ref 25–45)
POTASSIUM SERPL-MCNC: 4.7 MMOL/L — SIGNIFICANT CHANGE UP (ref 3.5–5.3)
POTASSIUM SERPL-SCNC: 4.7 MMOL/L — SIGNIFICANT CHANGE UP (ref 3.5–5.3)
PROT SERPL-MCNC: 6.9 GM/DL — SIGNIFICANT CHANGE UP (ref 6–8.3)
RBC # BLD: 2.95 M/UL — LOW (ref 3.8–5.2)
RBC # FLD: 17.4 % — HIGH (ref 10.3–14.5)
SAO2 % BLDV: 72 % — SIGNIFICANT CHANGE UP (ref 67–88)
SAO2 % BLDV: 96 % — HIGH (ref 67–88)
SODIUM SERPL-SCNC: 132 MMOL/L — LOW (ref 135–145)
SPECIMEN SOURCE: SIGNIFICANT CHANGE UP
SPECIMEN SOURCE: SIGNIFICANT CHANGE UP
TROPONIN I, HIGH SENSITIVITY RESULT: 187.08 NG/L — HIGH
TROPONIN I, HIGH SENSITIVITY RESULT: 280.79 NG/L — HIGH
TROPONIN I, HIGH SENSITIVITY RESULT: 397.07 NG/L — HIGH
TROPONIN I, HIGH SENSITIVITY RESULT: 893.19 NG/L — HIGH
WBC # BLD: 17.46 K/UL — HIGH (ref 3.8–10.5)
WBC # FLD AUTO: 17.46 K/UL — HIGH (ref 3.8–10.5)

## 2025-01-23 PROCEDURE — 99233 SBSQ HOSP IP/OBS HIGH 50: CPT

## 2025-01-23 PROCEDURE — 93325 DOPPLER ECHO COLOR FLOW MAPG: CPT | Mod: 26

## 2025-01-23 PROCEDURE — 93308 TTE F-UP OR LMTD: CPT | Mod: 26

## 2025-01-23 PROCEDURE — 99232 SBSQ HOSP IP/OBS MODERATE 35: CPT | Mod: 24

## 2025-01-23 PROCEDURE — 93321 DOPPLER ECHO F-UP/LMTD STD: CPT | Mod: 26

## 2025-01-23 RX ORDER — HEPARIN SODIUM,PORCINE 10000/ML
5000 VIAL (ML) INJECTION EVERY 8 HOURS
Refills: 0 | Status: DISCONTINUED | OUTPATIENT
Start: 2025-01-23 | End: 2025-01-24

## 2025-01-23 RX ORDER — MIDODRINE HYDROCHLORIDE 5 MG/1
5 TABLET ORAL ONCE
Refills: 0 | Status: COMPLETED | OUTPATIENT
Start: 2025-01-23 | End: 2025-01-24

## 2025-01-23 RX ORDER — OXYCODONE HYDROCHLORIDE 30 MG/1
10 TABLET ORAL EVERY 6 HOURS
Refills: 0 | Status: DISCONTINUED | OUTPATIENT
Start: 2025-01-23 | End: 2025-01-26

## 2025-01-23 RX ORDER — BISACODYL 5 MG
5 TABLET, DELAYED RELEASE (ENTERIC COATED) ORAL DAILY
Refills: 0 | Status: DISCONTINUED | OUTPATIENT
Start: 2025-01-23 | End: 2025-02-04

## 2025-01-23 RX ORDER — CASPOFUNGIN ACETATE 5 MG/ML
70 INJECTION, POWDER, LYOPHILIZED, FOR SOLUTION INTRAVENOUS ONCE
Refills: 0 | Status: COMPLETED | OUTPATIENT
Start: 2025-01-23 | End: 2025-01-23

## 2025-01-23 RX ORDER — OXYCODONE HYDROCHLORIDE 30 MG/1
2.5 TABLET ORAL EVERY 4 HOURS
Refills: 0 | Status: DISCONTINUED | OUTPATIENT
Start: 2025-01-23 | End: 2025-01-23

## 2025-01-23 RX ORDER — POLYETHYLENE GLYCOL 3350 17 G/17G
17 POWDER, FOR SOLUTION ORAL DAILY
Refills: 0 | Status: DISCONTINUED | OUTPATIENT
Start: 2025-01-23 | End: 2025-02-04

## 2025-01-23 RX ORDER — MEROPENEM 500 MG/20ML
1000 INJECTION INTRAVENOUS EVERY 8 HOURS
Refills: 0 | Status: DISCONTINUED | OUTPATIENT
Start: 2025-01-23 | End: 2025-01-23

## 2025-01-23 RX ORDER — SENNOSIDES 8.6 MG
2 TABLET ORAL AT BEDTIME
Refills: 0 | Status: DISCONTINUED | OUTPATIENT
Start: 2025-01-23 | End: 2025-02-04

## 2025-01-23 RX ORDER — ZOLPIDEM TARTRATE 5 MG/1
5 TABLET, COATED ORAL AT BEDTIME
Refills: 0 | Status: DISCONTINUED | OUTPATIENT
Start: 2025-01-23 | End: 2025-01-30

## 2025-01-23 RX ORDER — MEROPENEM 500 MG/20ML
1000 INJECTION INTRAVENOUS EVERY 8 HOURS
Refills: 0 | Status: DISCONTINUED | OUTPATIENT
Start: 2025-01-23 | End: 2025-02-03

## 2025-01-23 RX ORDER — CASPOFUNGIN ACETATE 5 MG/ML
50 INJECTION, POWDER, LYOPHILIZED, FOR SOLUTION INTRAVENOUS EVERY 24 HOURS
Refills: 0 | Status: DISCONTINUED | OUTPATIENT
Start: 2025-01-24 | End: 2025-02-03

## 2025-01-23 RX ORDER — OXYCODONE HYDROCHLORIDE 30 MG/1
5 TABLET ORAL ONCE
Refills: 0 | Status: DISCONTINUED | OUTPATIENT
Start: 2025-01-23 | End: 2025-01-23

## 2025-01-23 RX ORDER — OXYCODONE HYDROCHLORIDE 30 MG/1
5 TABLET ORAL EVERY 6 HOURS
Refills: 0 | Status: DISCONTINUED | OUTPATIENT
Start: 2025-01-23 | End: 2025-01-29

## 2025-01-23 RX ORDER — CASPOFUNGIN ACETATE 5 MG/ML
INJECTION, POWDER, LYOPHILIZED, FOR SOLUTION INTRAVENOUS
Refills: 0 | Status: DISCONTINUED | OUTPATIENT
Start: 2025-01-23 | End: 2025-02-03

## 2025-01-23 RX ORDER — NALOXONE HYDROCHLORIDE 3 MG/.1ML
0.4 SPRAY NASAL ONCE
Refills: 0 | Status: DISCONTINUED | OUTPATIENT
Start: 2025-01-23 | End: 2025-02-04

## 2025-01-23 RX ADMIN — Medication 2 TABLET(S): at 21:26

## 2025-01-23 RX ADMIN — MEROPENEM 1000 MILLIGRAM(S): 500 INJECTION INTRAVENOUS at 21:24

## 2025-01-23 RX ADMIN — PANTOPRAZOLE 40 MILLIGRAM(S): 20 TABLET, DELAYED RELEASE ORAL at 08:01

## 2025-01-23 RX ADMIN — OXYCODONE HYDROCHLORIDE 2.5 MILLIGRAM(S): 30 TABLET ORAL at 03:14

## 2025-01-23 RX ADMIN — CASPOFUNGIN ACETATE 70 MILLIGRAM(S): 5 INJECTION, POWDER, LYOPHILIZED, FOR SOLUTION INTRAVENOUS at 11:09

## 2025-01-23 RX ADMIN — MEROPENEM 1000 MILLIGRAM(S): 500 INJECTION INTRAVENOUS at 07:41

## 2025-01-23 RX ADMIN — AMIODARONE HYDROCHLORIDE 200 MILLIGRAM(S): 50 INJECTION, SOLUTION INTRAVENOUS at 09:22

## 2025-01-23 RX ADMIN — ZOLPIDEM TARTRATE 5 MILLIGRAM(S): 5 TABLET, COATED ORAL at 03:06

## 2025-01-23 RX ADMIN — Medication 40 MILLIGRAM(S): at 09:14

## 2025-01-23 RX ADMIN — Medication 1 TABLET(S): at 09:13

## 2025-01-23 RX ADMIN — ACETAMINOPHEN, DIPHENHYDRAMINE HCL, PHENYLEPHRINE HCL 3 MILLIGRAM(S): 325; 25; 5 TABLET ORAL at 21:25

## 2025-01-23 RX ADMIN — OXYCODONE HYDROCHLORIDE 5 MILLIGRAM(S): 30 TABLET ORAL at 11:22

## 2025-01-23 RX ADMIN — MONTELUKAST SODIUM 10 MILLIGRAM(S): 5 TABLET, CHEWABLE ORAL at 21:25

## 2025-01-23 RX ADMIN — VANCOMYCIN HYDROCHLORIDE 166.67 MILLIGRAM(S): KIT at 09:15

## 2025-01-23 RX ADMIN — Medication 5000 UNIT(S): at 21:26

## 2025-01-23 RX ADMIN — MEROPENEM 1000 MILLIGRAM(S): 500 INJECTION INTRAVENOUS at 16:42

## 2025-01-23 RX ADMIN — POLYETHYLENE GLYCOL 3350 17 GRAM(S): 17 POWDER, FOR SOLUTION ORAL at 09:15

## 2025-01-23 RX ADMIN — ATORVASTATIN CALCIUM 10 MILLIGRAM(S): 80 TABLET, FILM COATED ORAL at 21:24

## 2025-01-23 RX ADMIN — PANTOPRAZOLE 40 MILLIGRAM(S): 20 TABLET, DELAYED RELEASE ORAL at 16:42

## 2025-01-23 RX ADMIN — OXYCODONE HYDROCHLORIDE 2.5 MILLIGRAM(S): 30 TABLET ORAL at 08:00

## 2025-01-23 RX ADMIN — Medication 100 MILLIGRAM(S): at 09:13

## 2025-01-23 NOTE — CONSULT NOTE ADULT - ASSESSMENT
74-year-old Female with h/o DM type 2, TAVR 8/2024 on Plavix , A-fib on Xarelto, CHF, HLD, PVD, b/l LE venous insufficiency, bilateral CFA stenoses s/p Rt SFA endarterectomy on 12/17/24, recently hospitalized on 1/3 with right inguinal cellulitis/ postsurgical infection s/p right SFA endarterectomy with underlying inguinal collection treated with vancomycin 750 mg IV q12h, cefepime 2 gm IV q12h and metronidazole 500 mg IV q8h, then PO abx with PO cipro was admitted on 1/22 fever to Tmax of 101.9F, Soft BP in 90s/40-50s. Patient met sepsis criteria, cultures obtained and patient received vancomycin IV and ceftriaxone, then meropenem.    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 Possible UTI  #PVD  #Allergy to PCN  -cultures reviewed  -the patient was reported with a multiresistant organism in blood cultures. Prior cultures reviewed and noted with tissue cultures with ENFA and candida gablrata. An epidemiologic assessment was performed. The risk of the microorganism spread to family members, healthcare staff is low. Standard isolation in place at present time. Will reconsider isolation measures according to infection control policy, further culture results and other new information. The patient will be monitored closely, cultures collected as appropriate. Broad spectrum abx therapy was started.  -start meropenem 1 gm IV q8h and caspofungin 70 mg IV x 1, the n50 mg IV qd  -reason for abx use and side effects reviewed with patient; monitor BMP   -recent surgical c/s noted   -monitor closely in ej of PCN allergy history  -surgical evaluation --> s/p recent wound washout and VAC  -local wound care  -consider cardiology evaluation and ASHLEY  -f/u cultures  -monitor temps  -f/u CBC  -supportive care  2. Other issues:   -care per medicine    d/w Dr. Solis and medicine team    The patient may need prolonged IV antimicrobial therapy      74-year-old Female with h/o DM type 2, TAVR 8/2024 on Plavix , A-fib on Xarelto, CHF, HLD, PVD, b/l LE venous insufficiency, bilateral CFA stenoses s/p Rt SFA endarterectomy on 12/17/24, recently hospitalized on 1/3 with right inguinal cellulitis/ postsurgical infection s/p right SFA endarterectomy with underlying inguinal collection treated with vancomycin 750 mg IV q12h, cefepime 2 gm IV q12h and metronidazole 500 mg IV q8h, then PO abx with PO cipro was admitted on 1/22 fever to Tmax of 101.9F, Soft BP in 90s/40-50s. Patient met sepsis criteria, cultures obtained and patient received vancomycin IV and ceftriaxone, then meropenem.    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 Possible UTI  #PVD  #Allergy to PCN  -cultures reviewed  -the patient was reported with a multiresistant organism in blood cultures. Prior cultures reviewed and noted with tissue cultures with ENFA and candida gablrata. An epidemiologic assessment was performed. The risk of the microorganism spread to family members, healthcare staff is low. Standard isolation in place at present time. Will reconsider isolation measures according to infection control policy, further culture results and other new information. The patient will be monitored closely, cultures collected as appropriate. Broad spectrum abx therapy was started.  -start meropenem 1 gm IV q8h and caspofungin 70 mg IV x 1, then 50 mg IV qd  -reason for abx use and side effects reviewed with patient; monitor BMP   -recent surgical c/s noted   -monitor closely in ej of PCN allergy history  -surgical evaluation --> s/p recent wound washout and VAC  -local wound care  -consider cardiology evaluation and ASHLEY  -f/u cultures  -monitor temps  -f/u CBC  -supportive care  2. Other issues:   -care per medicine    d/w Dr. Solis and medicine team    The patient may need prolonged IV antimicrobial therapy

## 2025-01-23 NOTE — PATIENT PROFILE ADULT - FUNCTIONAL ASSESSMENT - BASIC MOBILITY 6.
2-calculated by average/Not able to assess (calculate score using [facility] averaging method)

## 2025-01-23 NOTE — PROGRESS NOTE ADULT - ASSESSMENT
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     PLAN  Imaging reviewed, groin site collection decreasing in size, 2 vessel run off to foot  Vascular team will follow for wound vac management    Plan discussed with Dr. Manjarrez

## 2025-01-23 NOTE — CONSULT NOTE ADULT - ASSESSMENT
73 y/o F with a h/o DM, TAVR 8/2024 on Plavix , A-fib on Xarelto, CHF, HLD, PVD, b/l LE venous insufficiency, bilateral CFA stenoses s/p Rt SFA endarterectomy on 12/17/24 complicated by postoperative seroma requiring irrigation and debridement and wound VAC placement Seen on January 18 for dislodgment of wound VAC tubing, with:    # Severe sepsis  # ESBL e coli bacteremia  # UTI  # Severe anemia    Patient can remain on telemetry for now, however low threshold to upgrade to SDU if there is any further deterioration. SBP 100s. MAP 67. Lactate down to 2.7. Has not received IVF in over 12 hours and appears somewhat hypovolemic. Please bolus 1L LR x 1 now. Can also consider midodrine for added vascular tone. Goal MAP > 65. Escalate antibiotic coverage to meropenem as ESBL e coli is growing in the blood cultures. No clinical evidence of bleeding at this time. CTA A/P is negative for extravasation of contrast. Will receive her third unit of PRBC now as her H/H did not respond appropriately to the first 2 units. Acute on chronic anemia secondary to sepsis? Occult GIB? Goal Hgb > 7.0. Suspect elevated troponin level is secondary to demand ischemia.    Case discussed with the patient at the bedside. Diagnosis, prognosis, and management plan outlined. All questions answered and concerns addressed.    Case discussed with hospitalist, Dr. Patiño.

## 2025-01-23 NOTE — CONSULT NOTE ADULT - NS ATTEND AMEND GEN_ALL_CORE FT
Patient seen at bedside this afternoon. S/p 3 units pRBC without any overt GIB bleeding. Last BM 3 days prior with brown stool as per patient and no nausea/vomiting. Anemia likely in the setting of recent surgery (I&D). Continue to monitor bowel movements, trend hemoglobin, and transfuse if <7.

## 2025-01-23 NOTE — CONSULT NOTE ADULT - SUBJECTIVE AND OBJECTIVE BOX
Patient is a 74y old  Female who presents with a chief complaint of    HPI:  This is a 74 year old female with significant past medical history of TAVR 8/2024 on Plavix , A-fib on Xarelto, CHF, HLD, PVD, b/l LE venous insufficiency, bilateral CFA stenoses s/p Rt SFA endarterectomy on 12/17/24 complicated by postoperative seroma requiring irrigation and debridement and wound VAC placement Seen on January 18 for dislodgment of wound VAC tubing presents for evaluation of fever/chills and rigors since last night. Patient reports she had felt like her usual self since her last discharge until today. She had acute onset subjective fevers, chills, rigors d/t which she came to ED. She was discharged from  on 1/18 with a course of ciprofloxacin which she reports being compliant with, reports having "5 pills" left in the course. She denies any N/V, cough, sore throat, rhinitis, chest pain, rash, diarrhea or dysuria.     In ED patient febrile with Tmax of 101.9, Soft BP in 90s/40-50s, HR in 70s. CBC noted for WBC 21, H/H 5.8/19, Plt 441. CMP noted for BUN/Cr 28/1.04. Lactate 2.5 > 3.7. F/u/COVID negative. UA cloudy, with trace ketones, mod leuks, pos nitrites, 158 wbcs. Guaiac positive. CTA of abdomen and b/l LE negative for acute hemorrhage. CXR read as "grossly clear lungs". Patient met sepsis criteria, cultures obtained and patient received broad spectrum antibiotics with vancomycin and ceftriaxone in ED. Patient admitted to  for sepsis and symptomatic anemia. FOBT+ in ED prompting GI consult for further evaluation.    Seen at bedside this afternoon with primary concern of acute 10/10 pain in right and left groins radiating down thighs. Denies epigastric or abdominal pain. has been stooling but patient admits she is unsure of color or consistency. Hgb 5.8 on arrival now 7.9 s/p three units transfused and hemodynamically stable. Patient reports colonoscopy with Dr. Hancock about 3-4 years ago and states it was "normal." Denies any history of GIB. Denies reflux or heartburn. Currently stating she is "very hot" asking for all blankets and socks to be removed. Afebrile.       PAST MEDICAL & SURGICAL HISTORY:  Diabetes mellitus type II, controlled      Atrial fibrillation      Congestive heart failure      Dyspnea      Morbid obesity with BMI of 40.0-44.9, adult      Neuropathy      Peripheral venous insufficiency      Thyroid nodule      HTN (hypertension)      Cellulitis      At risk for sleep apnea      History of esophagogastroduodenoscopy (EGD)      H/O colonoscopy      Other complications of gastric band procedure      S/P left knee arthroscopy      Status post medial meniscus repair      History of cholecystectomy      S/P cataract surgery          MEDICATIONS  (STANDING):  aMIOdarone    Tablet 200 milliGRAM(s) Oral daily  atorvastatin 10 milliGRAM(s) Oral at bedtime  caspofungin IVPB      furosemide    Tablet 40 milliGRAM(s) Oral daily  heparin   Injectable 5000 Unit(s) SubCutaneous every 8 hours  influenza  Vaccine (HIGH DOSE) 0.5 milliLiter(s) IntraMuscular once  meropenem Injectable 1000 milliGRAM(s) IV Push every 8 hours  montelukast 10 milliGRAM(s) Oral at bedtime  multivitamin/minerals 1 Tablet(s) Oral daily  naloxone Injectable 0.4 milliGRAM(s) IV Push once  pantoprazole  Injectable 40 milliGRAM(s) IV Push every 12 hours  polyethylene glycol 3350 17 Gram(s) Oral daily  senna 2 Tablet(s) Oral at bedtime  thiamine 100 milliGRAM(s) Oral daily  vancomycin  IVPB 1250 milliGRAM(s) IV Intermittent every 12 hours    MEDICATIONS  (PRN):  acetaminophen     Tablet .. 650 milliGRAM(s) Oral every 6 hours PRN Temp greater or equal to 38C (100.4F)  aluminum hydroxide/magnesium hydroxide/simethicone Suspension 30 milliLiter(s) Oral every 4 hours PRN Dyspepsia  bisacodyl 5 milliGRAM(s) Oral daily PRN Constipation  melatonin 3 milliGRAM(s) Oral at bedtime PRN Insomnia  ondansetron Injectable 4 milliGRAM(s) IV Push every 6 hours PRN Nausea and/or Vomiting  oxyCODONE    IR 5 milliGRAM(s) Oral every 6 hours PRN Moderate Pain (4 - 6)  oxyCODONE    IR 10 milliGRAM(s) Oral every 6 hours PRN Severe Pain (7 - 10)  zolpidem 5 milliGRAM(s) Oral at bedtime PRN Insomnia      Allergies    pregabalin (Unknown)  latex (Unknown)  penicillin (Hives; Urticaria)  PC Pen VK (Rash)    Intolerances        SOCIAL HISTORY:    FAMILY HISTORY:  Family history of breast cancer in mother    Family history of diabetes mellitus in mother    FHx: heart disease (Sibling)        REVIEW OF SYSTEMS:    CONSTITUTIONAL: No weakness, fevers or chills  EYES/ENT: No visual changes;  No vertigo or throat pain   NECK: No pain or stiffness  RESPIRATORY: No cough, wheezing, hemoptysis; No shortness of breath  CARDIOVASCULAR: No chest pain or palpitations  GASTROINTESTINAL: See HPI  GENITOURINARY: No dysuria, frequency or hematuria  NEUROLOGICAL: No numbness or weakness  SKIN: No itching, burning, rashes, or lesions   PSYCH: Normal mood and affect  All other review of systems is negative unless indicated above.    Vital Signs Last 24 Hrs  T(C): 37.1 (23 Jan 2025 12:11), Max: 38.8 (22 Jan 2025 16:15)  T(F): 98.8 (23 Jan 2025 12:11), Max: 101.9 (22 Jan 2025 16:15)  HR: 78 (23 Jan 2025 12:11) (60 - 90)  BP: 101/37 (23 Jan 2025 12:11) (91/52 - 160/62)  BP(mean): 99 (23 Jan 2025 11:28) (63 - 99)  RR: 18 (23 Jan 2025 12:11) (14 - 24)  SpO2: 99% (23 Jan 2025 12:11) (94% - 100%)    Parameters below as of 23 Jan 2025 12:11  Patient On (Oxygen Delivery Method): nasal cannula  O2 Flow (L/min): 2    PHYSICAL EXAM:    Constitutional: acute distress from pain, non-toxic appearing  HEENT: masked, good phonation, not icteric  Neck: supple, no lymphadenopathy  Respiratory: clear to ascultation bilaterally, no wheezing  Cardiovascular: S1 and S2, regular rate and rhythm, no murmurs rubs or gallops  Gastrointestinal: soft, non-tender, non-distended, +bowel sounds, no rebound or guarding, no surgical scars, no drains  Extremities: 2+ peripheral edema, no cyanosis or clubbing  Vascular: 2+ peripheral pulses, venous stasis  Neurological: A/O x 3, no focal deficits, no asterixis  Psychiatric: Normal mood, normal affect  Skin: No rashes, not jaundiced    LABS:                        7.9    17.46 )-----------( 286      ( 23 Jan 2025 06:26 )             25.7     01-23    132[L]  |  105  |  25[H]  ----------------------------<  96  4.7   |  22  |  1.21    Ca    8.3[L]      23 Jan 2025 06:26  Phos  4.3     01-23  Mg     1.7     01-23    TPro  6.9  /  Alb  2.6[L]  /  TBili  1.0  /  DBili  x   /  AST  37  /  ALT  9[L]  /  AlkPhos  61  01-23    PT/INR - ( 22 Jan 2025 11:22 )   PT: 25.8 sec;   INR: 2.26 ratio         PTT - ( 22 Jan 2025 11:22 )  PTT:33.6 sec  LIVER FUNCTIONS - ( 23 Jan 2025 06:26 )  Alb: 2.6 g/dL / Pro: 6.9 gm/dL / ALK PHOS: 61 U/L / ALT: 9 U/L / AST: 37 U/L / GGT: x             RADIOLOGY & ADDITIONAL STUDIES:  FINDINGS:  LOWER CHEST: Status post TAVR. Mitral annulus calcification. Decreased   attenuation of the vascular blood pool, compatible with anemia. The lung   bases are clear.    LIVER: Again is noted a lobular cyst in the right lobe of the liver   laterally. Otherwise, within normal limits.  BILE DUCTS: Normal caliber.  GALLBLADDER: Removed.  SPLEEN: Within normal limits.  PANCREAS: Within normal limits.  ADRENALS: Within normal limits.  KIDNEYS/URETERS: Small bilateral renal cysts. Otherwise, within normal   limits.    BLADDER: Minimally distended.  REPRODUCTIVE ORGANS: Uterus and adnexa within normal limits.    BOWEL: Sigmoid diverticulosis. No bowel obstruction. Appendix normal.  PERITONEUM: No ascites. There is no evidence of hyperattenuating   collection within the peritoneal cavity or retroperitoneum to suggest   acute hemorrhage.  VESSELS: Extensive atherosclerotic disease of the nonaneurysmal abdominal   aorta. Status post endarterectomy of the right proximal focal femoral   artery.    On the right, there is moderate stenosis of the proximal common iliac   artery, severe stenosis of the common femoral artery, and diffuse   multifocal moderate to severe stenosis of the superficial femoral artery,   popliteal artery, trifurcation, and calf arteries. There is diminutive   two-vessel runoff to the foot with an occluded posterior tibial artery.    On the left, there is moderate stenosis of the proximal common iliac   artery, common femoral artery, and diffuse multifocal moderate stenosis   of the superficial femoral artery, popliteal artery, trifurcation, and   calf arteries. There is diminutive two-vessel runoff to the foot with an   occluded posterior tibial artery.    The IVC and pelvic veins opacify unremarkably.    RETROPERITONEUM/LYMPH NODES: No lymphadenopathy.  ABDOMINAL WALL: Status post I&D of the right groin with a residual   tracking peripherally enhancing 5.3 x 2.4 cm collection in the   subcutaneous tissues. Fat-containing umbilical hernia.  BONES: There is moderate soft tissue swelling of the dorsum of the left   foot. No subcutaneous gas is identified. Degenerative disc disease and   spondylosis of the lumbar spine.    IMPRESSION: No evidence of acute hemorrhage in the abdomen, pelvis, and   bilateral lower extremities.. Status post I&D of right groin collection.

## 2025-01-23 NOTE — DIETITIAN INITIAL EVALUATION ADULT - OTHER INFO
75 y/o F with past medical history of DM, TAVR 8/2024 on Plavix , A-fib on Xarelto, CHF, HLD, PVD, b/l LE venous insufficiency, bilateral CFA stenoses s/p Rt SFA endarterectomy on 12/17/24 complicated by postoperative seroma requiring irrigation and debridement and wound VAC placement Seen on January 18 for dislodgment of wound VAC tubing presents for evaluation of fever/chills and rigors since last night. Patient reports she had felt like her usual self since her last discharge until today. She had acute onset subjective fevers, chills, rigors d/t which she came to ED. She was discharged from  on 1/18 with a course of ciprofloxacin which she reports being compliant with, reports having "5 pills" left in the course. She denies any N/V, cough, sore throat, rhinitis, chest pain, rash, diarrhea or dysuria. Guaiac positive. CTA of abdomen and b/l LE negative for acute hemorrhage. CXR read as "grossly clear lungs". Patient met sepsis criteria, cultures obtained and patient received broad spectrum antibiotics with vancomycin and ceftriaxone in ED. Per critical care: Evaluated for potential upgrade to StepDown Unit due to borderline BP and inability to clear lactate. SBP currently in the 100s. Lactate 2.7. Patient is complaining of sacral pain and discomfort from the Primafit device. Admitting diagnosis: Anemia, HFrEF, sepsis, lower GIB    Pt seen by RD services; met criteria for PCM 12/2024. Pt reports no change in appetite upon admission. Pt reports UBW ~240#. RD obtained bedscale wt 243# on 1/23/25 - no edema doc'd in flowsheet. Wt history reviewed: 231# on 1/4/25 noted with 2+/3+ edema (taken by RD), 252# on 5/6/24 (taken by RD). Unable to determine any pertinent wt changes d/t fluid loss/gain vs wt loss/gain. Patient appears obese, NFPE revealed mild temporal wasting. Body habitus could be masking additional wasting. Recommend to c/w DASH/TLC diet d/t decompensated heart failure. Pt not receptive to education at this time; lethargic and in pain. Confirm goals of care regarding nutrition support. Please see additional recommendations below.

## 2025-01-23 NOTE — DIETITIAN INITIAL EVALUATION ADULT - PROBLEM SELECTOR PLAN 1
-Consulted ICU, patient deemed stable for tele floor.   -C/w broad spectrum abx - vanc/radha  -F/u cultures  -ID consult.

## 2025-01-23 NOTE — DIETITIAN INITIAL EVALUATION ADULT - PROBLEM SELECTOR PLAN 4
-Last ECHO done on 12/05/2024: LVEF 56%, LV cavity moderately dilated, normal wall thickness, normal LVEF, severe (grade 3) LV diastolic dysfunction, severe LA dilatation, mod to moderate RA dilatation, normal RV size and function.   -She had mild tachypnea, with mild bibasilar crackles after 2L IVFs and 2 pRBCs, dyspnea improved with 1 dose of 20 mg IVP lasix.  -C/w her lasix, hold her metoprolol and aldactone d/t ABLA and soft BP.    -Monitor weights, strict ins and outs.

## 2025-01-23 NOTE — DIETITIAN INITIAL EVALUATION ADULT - ADD RECOMMEND
1. C/w current diet  2. MVI w/ minerals daily to ensure 100% RDA met   3. Monitor bowel movements, if no BM for >3 days, consider implementing bowel regimen.  4. Monitor daily lytes/min and replete prn  5. Obtain weekly wt to track/trend changes  6. Consider f/u heart failure education - when pt more receptive  7. Confirm goals of care regarding nutrition support  RD will continue to monitor PO intake, labs, hydration, and wt prn.

## 2025-01-23 NOTE — DIETITIAN INITIAL EVALUATION ADULT - PERTINENT MEDS FT
MEDICATIONS  (STANDING):  aMIOdarone    Tablet 200 milliGRAM(s) Oral daily  atorvastatin 10 milliGRAM(s) Oral at bedtime  caspofungin IVPB      furosemide    Tablet 40 milliGRAM(s) Oral daily  meropenem Injectable 1000 milliGRAM(s) IV Push every 8 hours  montelukast 10 milliGRAM(s) Oral at bedtime  multivitamin/minerals 1 Tablet(s) Oral daily  naloxone Injectable 0.4 milliGRAM(s) IV Push once  oxyCODONE    IR 5 milliGRAM(s) Oral once  pantoprazole  Injectable 40 milliGRAM(s) IV Push every 12 hours  polyethylene glycol 3350 17 Gram(s) Oral daily  senna 2 Tablet(s) Oral at bedtime  thiamine 100 milliGRAM(s) Oral daily  vancomycin  IVPB 1250 milliGRAM(s) IV Intermittent every 12 hours    MEDICATIONS  (PRN):  acetaminophen     Tablet .. 650 milliGRAM(s) Oral every 6 hours PRN Temp greater or equal to 38C (100.4F)  aluminum hydroxide/magnesium hydroxide/simethicone Suspension 30 milliLiter(s) Oral every 4 hours PRN Dyspepsia  bisacodyl 5 milliGRAM(s) Oral daily PRN Constipation  melatonin 3 milliGRAM(s) Oral at bedtime PRN Insomnia  ondansetron Injectable 4 milliGRAM(s) IV Push every 6 hours PRN Nausea and/or Vomiting  oxyCODONE    IR 5 milliGRAM(s) Oral every 6 hours PRN Moderate Pain (4 - 6)  oxyCODONE    IR 10 milliGRAM(s) Oral every 6 hours PRN Severe Pain (7 - 10)  zolpidem 5 milliGRAM(s) Oral at bedtime PRN Insomnia

## 2025-01-23 NOTE — DIETITIAN INITIAL EVALUATION ADULT - PERTINENT LABORATORY DATA
01-23    132[L]  |  105  |  25[H]  ----------------------------<  96  4.7   |  22  |  1.21    Ca    8.3[L]      23 Jan 2025 06:26  Phos  4.3     01-23  Mg     1.7     01-23    TPro  6.9  /  Alb  2.6[L]  /  TBili  1.0  /  DBili  x   /  AST  37  /  ALT  9[L]  /  AlkPhos  61  01-23  A1C with Estimated Average Glucose Result: 5.8 % (04-30-24 @ 05:44)

## 2025-01-23 NOTE — ED ADULT NURSE REASSESSMENT NOTE - NS ED NURSE REASSESS COMMENT FT1
Resumed care from VLADIMIR Edwards. Patient assisted into hospital bed at this time, pillows provided, medicated for pain but requesting different pain medication, left message with EVAN Villegas and awaiting call back, primafit removed as patient complained of discomfort, cardiac monitoring maintained, call bell within reach, plan of care discussed with patient.

## 2025-01-23 NOTE — CONSULT NOTE ADULT - ASSESSMENT
74 year old female with history AF on Xarelto, multiple vascualr surgeries on plavix with most recent right femoral arterial endart 12/17/24 complicated by infection requiring wound vac and subsequent I&D's admitted with sepsis 2/2 wound infection @ right groin with 5.3x2.4cm collection in tissue and significant anemia, Hgb 5.8 on arrival now 7.9 with stable hemodynamics, no overt evidence of GIB and FOBT+ in ED.    Imp: Multifactorial anemia due to nonhealing wound requiring I&D with collection, sepsis, with low suspicion for GIB as no overt bleeding with Hgb 5.8.    Rec:  ::Continue trend HH and transfuse to maintain Hgb >7.0  ::No urgent endoscopic evaluation recommended  ::Should patient require AC for prevention thrombosis would opt for UFH and surveillance  ::PPI IV BID ok 74 year old female with history AF on Xarelto, multiple vascular surgeries on Plavix with most recent right femoral arterial endart 12/17/24 complicated by infection requiring wound vac and subsequent I&D's admitted with sepsis 2/2 wound infection @ right groin with 5.3x2.4cm collection in tissue and significant anemia, Hgb 5.8 on arrival now 7.9 with stable hemodynamics, no overt evidence of GIB and FOBT+ in ED.    Imp: Multifactorial anemia due to nonhealing wound requiring I&D with collection, sepsis, with low suspicion for GIB as no overt bleeding with Hgb 5.8.    Rec:  ::Continue trend HH and transfuse to maintain Hgb >7.0  ::No urgent endoscopic evaluation recommended  ::Should patient require AC for prevention thrombosis would opt for UFH and surveillance  ::PPI IV BID ok

## 2025-01-23 NOTE — DIETITIAN INITIAL EVALUATION ADULT - PROBLEM SELECTOR PLAN 5
-In sinus rhythm here.   -C/w her amiodarone.   -Hold Cardizem, metoprolol and xarelto d/t soft BP and ABLA.  -Resume when appropriate.

## 2025-01-23 NOTE — PATIENT PROFILE ADULT - FALL HARM RISK - HARM RISK INTERVENTIONS

## 2025-01-23 NOTE — PROGRESS NOTE ADULT - ASSESSMENT
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 Patient reports she had felt like her usual self since her last discharge until today. She had acute onset subjective fevers, chills, rigors d/t which she came to ED. In ED patient febrile with Tmax of 101.9, Soft BP in 90s/40-50s, HR in 70s. CBC noted for WBC 21, H/H 5.8/19. Admitted for:    #Sepsis 2/2 ESBL E. Coli bacteremia , possible UTI  #Right inguinal postsurgical bacterial and fungal infection s/p right SFA endarterectomy s/p collection with ENFA and CAGL  -Consulted ICU, patient deemed stable for tele floor  -Tmax 101.9F, WBC 21, lactate 3.7 -> 1.6  -RVP negative, CXR clear   -Unclear etiology of bacteremia - ddx includes UTI, infected R. groin surgical wound, endocarditis   -C/w broad spectrum abx - vanc/radha  -BCx 1/22 growing ESBL E. Coli   -UA suggestive of UTI, F/U UCx  -ID consulted , added caspofungin for recent wound cx   -Vascular surgery following  -Wound care team consulted given on wound vac     #Acute blood loss anemia, r/o GIB   -Patient on anticoagulation for Afib, with guaiac positive test  -C/w protonix BID  -Hgb 5.8 --> 7.9 s/p 3U PRBC   -Hold her xarelto, plavix d/t suspected ABLA  -Hold her metoprolol, cardizem, and aldactone d/t doft BP.   -GI consulted, f/u recs  -Continue to trend H/H    #Right groin wound.   -S/p wound debridement w penrose drain and wound vac placement on 1/8/25 replaced 1/11.  -vascular following  -C/w broad spectrum antibiotics  -PRN oxycodone for pain control.    #Chronic heart failure with preserved ejection fraction (HFpEF).   -Last ECHO done on 12/05/2024: LVEF 56%, LV cavity moderately dilated, normal wall thickness, normal LVEF, severe (grade 3) LV diastolic dysfunction, severe LA dilatation, mod to moderate RA dilatation, normal RV size and function.   -She had mild tachypnea, with mild bibasilar crackles after 2L IVFs and 3 pRBCs, dyspnea improved with 1 dose of 20 mg IVP lasix.  -C/w her lasix, hold her metoprolol and aldactone d/t ABLA and soft BP     -Monitor weights, strict ins and outs.    #paroxsymal Atrial fibrillation.   #Elevated troponin   -In sinus rhythm here.   -C/w her amiodarone.   -Hold Cardizem, metoprolol and xarelto d/t soft BP and ABLA.  -Resume when appropriate  -Troponin trend as above  -Cardiology consulted    #DM (diabetes mellitus).   ·  Plan: -Last A1c 5.8 (4/2024)  -Not on any medications.   -Carb restricted diet when appropriate.    #HLD (hyperlipidemia).   -on home statin and Zetia  -C/w her statin.     Problem/Plan - 8:  ·  Problem: Insomnia.  ·  Plan: Ambien.     Problem/Plan - 9:  ·  Problem: Prophylactic measure.   ·  Plan: DVT ppx - SCDs. Holding Xarelto as above

## 2025-01-23 NOTE — PATIENT PROFILE ADULT - CAREGIVER NAME
[address] [facility] Podiatry  Progress Note    Teri Mae is a 70year old female. Chief Complaint   Patient presents with    Post-Op     3 POV  left foot- no pain, noted swelling. HPI:     This is a pleasant female that presents to clinic 3 week S/P left hallux IPJ fusion. She has kept her dressings CDI and is wearing post op shoe. She denies any pain. She has 1 more day of the abx. Allergies: Cat hair extract, Dander, and Scallops   Current Outpatient Medications   Medication Sig Dispense Refill    cephalexin 500 MG Oral Cap Take 1 capsule (500 mg total) by mouth 2 (two) times daily for 7 days. 14 capsule 0    atorvastatin (LIPITOR) 10 MG Oral Tab Take 1 tablet (10 mg total) by mouth nightly. For cholesterol. 90 tablet 1    escitalopram 5 MG Oral Tab Take 1 tablet (5 mg total) by mouth daily with dinner. 90 tablet 3    CLOTRIMAZOLE 1 % External Cream APPLY TO LABIA TWICE DAILY; USE ADDITION 5 DAYS AFTER ALL SYMPTOMS RESOLVED 45 g 0    CALCIUM OR Take 500 mg by mouth in the morning, at noon, and at bedtime. estradiol 0.1 MG/GM Vaginal Cream       metRONIDAZOLE 0.75 % External Cream Apply topically 2 (two) times daily. Lubricants (K-Y JELLY) External Gel Apply 1 Application topically. Ibandronate Sodium 150 MG Oral Tab Take 1 tablet (150 mg total) by mouth every 30 (thirty) days. Help with bone density and osteoporosis.  3 tablet 3      Past Medical History:   Diagnosis Date    Leg fracture 2007    Low blood pressure       Past Surgical History:   Procedure Laterality Date    COLONOSCOPY  04/2020    COLONOSCOPY N/A 5/8/2020    Procedure: COLONOSCOPY, POSSIBLE BIOPSY, POSSIBLE POLYPECTOMY 06451;  Surgeon: Adalid Rojas MD;  Location: [address] [place] [address] LOCALIZATION WIRE 1 SITE RIGHT ([id number])        Family History   Problem Relation Age of Onset    Colon Cancer Mother     Dementia Mother     Dementia Father     Colon Cancer Maternal Grandmother     Cancer Sister         vaginal      Social History     Socioeconomic History    Marital status:    Tobacco Use    Smoking status: Never    Smokeless tobacco: Never   Vaping Use    Vaping Use: Never used   Substance and Sexual Activity    Alcohol use: Yes     Alcohol/week: 0.0 standard drinks of alcohol     Comment: wine, 2-3 glasses daily    Drug use: No   Other Topics Concern    Caffeine Concern No    Exercise Yes     Comment: 5-6 x weekly, classes/walking    History of tanning No    Pt has a pacemaker No    Reaction to local anesthetic No    Left Handed Yes    Right Handed No    Currently spends a great deal of time in the sun No    Hx of Spending [place] of Time in Sun Yes    Bad sunburns in the past Yes    Tanning Salons in the Past No    Hx of Radiation Treatments No           REVIEW OF SYSTEMS:   Denies nausea, fever, chills  No calf pain  No other muscle or joint aches  Denies chest pain or shortness of breath. EXAM:   There were no vitals taken for this visit. Constitutional:   Patient in no apparent distress. Well kept Of normal body habitus. Alert and oriented to person, place, and time. Integumentary examination:   There are no varicosities. Skin appears moist, warm, and supple with positive hair growth. Left hallux incision sites are well adhered with sutures intact and resolution of erythmea    Vascular examination:   DP pulse is 2/4  PT pulse is 2/4  Capillary refill is immediate    Left hallux edema, improved    Neurological examination:   Vibratory (128-Hz tuning fork) sensation is present to right and is present to left. Sharp/dull is present to right and is present to left. Musculoskeletal examination:  Muscle Strength is 5/5.     Left hallux rectus      LABS & IMAGING:     Lab Results   Component Value Date    GLU 86 10/10/2023    BUN 13 10/10/2023    CREATSERUM 0.78 10/10/2023    BUNCREA 16.7 10/10/2023    ANIONGAP 7 10/10/2023    GFRAA 85 07/25/2022    GFRNAA 74 07/25/2022 CA 9.1 10/10/2023     10/10/2023    K 4.2 10/10/2023     10/10/2023    CO2 27.0 10/10/2023    OSMOCALC 297 (H) 10/10/2023        Lab Results   Component Value Date    EAG 91 10/10/2023    A1C 4.8 10/10/2023        No results found. ASSESSMENT AND PLAN:   Diagnoses and all orders for this visit:    S/P foot surgery, left    Post-operative state              Plan:       Evaluated left hallux incision sites which are healing well with resolution of erythema    Pt has 1 more day of the keflex, she is to finish    Removed left hallux sutures without incident    Pt to be PWB left foot with post op shoe using heel  Pt to limit ambulation and to elevate left foot when resting    Xray left foot: 11/10/23  CONCLUSION: There is nonspecific bone lucency around the threaded segment of screw within the proximal phalanx, which may represent postprocedural bone remodeling or hardware loosening. Near anatomic alignment of the major osseous structures. Ongoing favorable pattern of bone healing related to fracture within the head of the 1st proximal phalanx. Discussed in great detail with pt and her  the importance of post op compliance. I explained to her that she has a higher chance of post operative complications due to her excessive ambulation the first week after surgery, removing her dressings early and not wearing her post op shoe. She does have a higher chance of delayed union, hardware failure, wound dehiscence and possible infection. RTC 2-3 week for post op care. Will get new xrays and if improved may consider transition to supportive tennis shoes. No follow-ups on file.     Beryl Montana DPM  11/10/23 Rox Kirby

## 2025-01-23 NOTE — DIETITIAN INITIAL EVALUATION ADULT - NSFNSGIIOFT_GEN_A_CORE
I&O's Detail    22 Jan 2025 07:01  -  23 Jan 2025 07:00  --------------------------------------------------------  IN:    PRBCs (Packed Red Blood Cells): 347 mL  Total IN: 347 mL    OUT:  Total OUT: 0 mL    Total NET: 347 mL      23 Jan 2025 07:01  -  23 Jan 2025 11:30  --------------------------------------------------------  IN:  Total IN: 0 mL    OUT:    Voided (mL): 350 mL  Total OUT: 350 mL    Total NET: -350 mL

## 2025-01-23 NOTE — DIETITIAN INITIAL EVALUATION ADULT - PROBLEM SELECTOR PLAN 3
-S/p wound debridement w penrose drain and wound vac placement on 1/8/25 replaced 1/11.  -Sepsis work up obtained and s/p vancomycin and ceftriaxone in ED.   -C/w broad spectrum antibiotics  -PRN oxycodone for pain control

## 2025-01-23 NOTE — DIETITIAN INITIAL EVALUATION ADULT - ORAL INTAKE PTA/DIET HISTORY
Pt reports lives at home with her  and when she is up to it will shop and cook for the household. Reports she stays away from salt and sugar. States she will typically eat 2-3 meals a day; however reports decreased PO intake x a few days d/t not feeling well.     Per previous admit RD note (1/5/25): Reports she lives with her  and she does the shopping and cooking.  Endorses 2-3 meals/day.  Does not follow a specific diet at home but reports she "knows how to eat".  Likely meeting % ENN.

## 2025-01-23 NOTE — PATIENT PROFILE ADULT - FUNCTIONAL ASSESSMENT - BASIC MOBILITY 3.
Addended by: DANGELO CLAIRE on: 9/5/2020 06:31 PM     Modules accepted: Orders     2 = A lot of assistance

## 2025-01-23 NOTE — PROGRESS NOTE ADULT - SUBJECTIVE AND OBJECTIVE BOX
SURGERY DAILY PROGRESS NOTE:     Subjective:  Patient seen and examined at bedside during am rounds. AVSS. Denies any fevers, chills, n/v/d, chest pain or shortness of breath.  s/p 3 units PRBC     Objective:    MEDICATIONS  (STANDING):  aMIOdarone    Tablet 200 milliGRAM(s) Oral daily  atorvastatin 10 milliGRAM(s) Oral at bedtime  furosemide    Tablet 40 milliGRAM(s) Oral daily  meropenem Injectable 1000 milliGRAM(s) IV Push every 8 hours  montelukast 10 milliGRAM(s) Oral at bedtime  multivitamin/minerals 1 Tablet(s) Oral daily  naloxone Injectable 0.4 milliGRAM(s) IV Push once  pantoprazole  Injectable 40 milliGRAM(s) IV Push every 12 hours  polyethylene glycol 3350 17 Gram(s) Oral daily  senna 2 Tablet(s) Oral at bedtime  thiamine 100 milliGRAM(s) Oral daily  vancomycin  IVPB 1250 milliGRAM(s) IV Intermittent every 12 hours    MEDICATIONS  (PRN):  acetaminophen     Tablet .. 650 milliGRAM(s) Oral every 6 hours PRN Temp greater or equal to 38C (100.4F)  aluminum hydroxide/magnesium hydroxide/simethicone Suspension 30 milliLiter(s) Oral every 4 hours PRN Dyspepsia  bisacodyl 5 milliGRAM(s) Oral daily PRN Constipation  melatonin 3 milliGRAM(s) Oral at bedtime PRN Insomnia  ondansetron Injectable 4 milliGRAM(s) IV Push every 6 hours PRN Nausea and/or Vomiting  oxyCODONE    IR 2.5 milliGRAM(s) Oral every 4 hours PRN Moderate Pain (4 - 6)  zolpidem 5 milliGRAM(s) Oral at bedtime PRN Insomnia      Vital Signs Last 24 Hrs  T(C): 36.8 (23 Jan 2025 04:19), Max: 39.5 (22 Jan 2025 12:01)  T(F): 98.3 (23 Jan 2025 04:19), Max: 103.1 (22 Jan 2025 12:01)  HR: 62 (23 Jan 2025 03:19) (60 - 82)  BP: 101/57 (23 Jan 2025 03:19) (91/52 - 136/98)  BP(mean): 72 (23 Jan 2025 03:19) (63 - 112)  RR: 16 (23 Jan 2025 03:19) (16 - 26)  SpO2: 99% (23 Jan 2025 03:19) (97% - 100%)    Parameters below as of 23 Jan 2025 03:19  Patient On (Oxygen Delivery Method): nasal cannula  O2 Flow (L/min): 2        PHYSICAL EXAM   Gen: well-appearing, in no acute distress  CV: pulse regularly present   Resp: airway patent, non-labored breathing  Abd: soft, NTND; no rebound or guarding. Incision c/d/i      I&O's Detail    22 Jan 2025 07:01  -  23 Jan 2025 04:58  --------------------------------------------------------  IN:    PRBCs (Packed Red Blood Cells): 347 mL  Total IN: 347 mL    OUT:  Total OUT: 0 mL    Total NET: 347 mL          Daily Height in cm: 177.8 (22 Jan 2025 11:16)    Daily     LABS:                        6.3    16.05 )-----------( 277      ( 22 Jan 2025 21:47 )             21.8     01-22    137  |  108  |  24[H]  ----------------------------<  134[H]  3.6   |  21[L]  |  1.38[H]    Ca    8.1[L]      22 Jan 2025 21:47    TPro  6.5  /  Alb  2.5[L]  /  TBili  0.7  /  DBili  x   /  AST  13[L]  /  ALT  <6[L]  /  AlkPhos  59  01-22    PT/INR - ( 22 Jan 2025 11:22 )   PT: 25.8 sec;   INR: 2.26 ratio         PTT - ( 22 Jan 2025 11:22 )  PTT:33.6 sec  Urinalysis Basic - ( 22 Jan 2025 21:47 )    Color: x / Appearance: x / SG: x / pH: x  Gluc: 134 mg/dL / Ketone: x  / Bili: x / Urobili: x   Blood: x / Protein: x / Nitrite: x   Leuk Esterase: x / RBC: x / WBC x   Sq Epi: x / Non Sq Epi: x / Bacteria: x        RADIOLOGY & ADDITIONAL STUDIES:    ASSESSMENT/PLAN:

## 2025-01-23 NOTE — CONSULT NOTE ADULT - SUBJECTIVE AND OBJECTIVE BOX
Patient is a 74y old  Female who presents with a chief complaint of fever    HPI:  74-year-old Female with h/o DM type 2, TAVR 8/2024 on Plavix , A-fib on Xarelto, CHF, HLD, PVD, b/l LE venous insufficiency, bilateral CFA stenoses s/p Rt SFA endarterectomy on 12/17/24, recently hospitalized on 1/3 with right inguinal cellulitis/ postsurgical infection s/p right SFA endarterectomy with underlying inguinal collection treated with vancomycin 750 mg IV q12h, cefepime 2 gm IV q12h and metronidazole 500 mg IV q8h, then PO abx with PO cipro was admitted on 1/22 fever to Tmax of 101.9F, Soft BP in 90s/40-50s. Patient met sepsis criteria, cultures obtained and patient received vancomycin IV and ceftriaxone, then meropenem.  She was reported with new bacteremia.    PMH: as above  PSH: as above  Meds: per reconciliation sheet, noted below  MEDICATIONS  (STANDING):  aMIOdarone    Tablet 200 milliGRAM(s) Oral daily  atorvastatin 10 milliGRAM(s) Oral at bedtime  caspofungin IVPB      furosemide    Tablet 40 milliGRAM(s) Oral daily  meropenem  IVPB 1000 milliGRAM(s) IV Intermittent every 8 hours  montelukast 10 milliGRAM(s) Oral at bedtime  multivitamin/minerals 1 Tablet(s) Oral daily  naloxone Injectable 0.4 milliGRAM(s) IV Push once  pantoprazole  Injectable 40 milliGRAM(s) IV Push every 12 hours  polyethylene glycol 3350 17 Gram(s) Oral daily  senna 2 Tablet(s) Oral at bedtime  thiamine 100 milliGRAM(s) Oral daily  vancomycin  IVPB 1250 milliGRAM(s) IV Intermittent every 12 hours    MEDICATIONS  (PRN):  acetaminophen     Tablet .. 650 milliGRAM(s) Oral every 6 hours PRN Temp greater or equal to 38C (100.4F)  aluminum hydroxide/magnesium hydroxide/simethicone Suspension 30 milliLiter(s) Oral every 4 hours PRN Dyspepsia  bisacodyl 5 milliGRAM(s) Oral daily PRN Constipation  melatonin 3 milliGRAM(s) Oral at bedtime PRN Insomnia  ondansetron Injectable 4 milliGRAM(s) IV Push every 6 hours PRN Nausea and/or Vomiting  oxyCODONE    IR 2.5 milliGRAM(s) Oral every 4 hours PRN Moderate Pain (4 - 6)  zolpidem 5 milliGRAM(s) Oral at bedtime PRN Insomnia    Allergies    pregabalin (Unknown)  latex (Unknown)  penicillin (Hives; Urticaria)  PC Pen VK (Rash)    Intolerances      Social: no smoking, no alcohol, no illegal drugs; no recent travel, no exposure to TB  FAMILY HISTORY:  Family history of breast cancer in mother  Family history of diabetes mellitus in mother  FHx: heart disease (Sibling)    ROS: the patient denies fever, no chills, no HA, no seizures, no dizziness, no sore throat, no nasal congestion, no blurry vision, no CP, no palpitations, no SOB, no cough, no abdominal pain, no diarrhea, no N/V, no dysuria, no leg pain, has right inguinal VAC, no joint aches, no rectal pain or bleeding, no night sweats  All other systems reviewed and are negative    Vital Signs Last 24 Hrs  T(C): 36.7 (23 Jan 2025 07:28), Max: 39.5 (22 Jan 2025 12:01)  T(F): 98.1 (23 Jan 2025 07:28), Max: 103.1 (22 Jan 2025 12:01)  HR: 62 (23 Jan 2025 08:00) (60 - 82)  BP: 118/57 (23 Jan 2025 08:00) (91/52 - 160/62)  BP(mean): 65 (23 Jan 2025 07:28) (63 - 112)  RR: 18 (23 Jan 2025 07:28) (14 - 26)  SpO2: 98% (23 Jan 2025 07:28) (94% - 100%)    Parameters below as of 23 Jan 2025 07:28  Patient On (Oxygen Delivery Method): nasal cannula  O2 Flow (L/min): 2    Daily Height in cm: 177.8 (22 Jan 2025 11:16)    Daily     PE:    Constitutional:  No acute distress  HEENT: NC/AT, EOMI, PERRLA, conjunctivae clear; ears and nose atraumatic; pharynx benign  Neck: supple; thyroid not palpable  Back: no tenderness  Respiratory: respiratory effort normal; clear to auscultation  Cardiovascular: S1S2 regular, no murmurs  Abdomen: soft, not tender, not distended, positive BS; no liver or spleen organomegaly  Genitourinary: no suprapubic tenderness  Lymphatic: no LN palpable  Musculoskeletal: no muscle tenderness, no joint swelling or tenderness  Extremities: no pedal edema  Right inguinal postsurgical wound with VAC  Neurological/ Psychiatric: AxOx3, judgement and insight normal; moving all extremities  Skin: no rashes; no palpable lesions    Labs: all available labs reviewed                        7.9    17.46 )-----------( 286      ( 23 Jan 2025 06:26 )             25.7     01-23    132[L]  |  105  |  25[H]  ----------------------------<  96  4.7   |  22  |  1.21    Ca    8.3[L]      23 Jan 2025 06:26  Phos  4.3     01-23  Mg     1.7     01-23    TPro  6.9  /  Alb  2.6[L]  /  TBili  1.0  /  DBili  x   /  AST  37  /  ALT  9[L]  /  AlkPhos  61  01-23     LIVER FUNCTIONS - ( 23 Jan 2025 06:26 )  Alb: 2.6 g/dL / Pro: 6.9 gm/dL / ALK PHOS: 61 U/L / ALT: 9 U/L / AST: 37 U/L / GGT: x           Urinalysis with Rflx Culture (01-22 @ 11:20)  Urine Appearance: Cloudy  Protein, Urine: Negative mg/dL  Urine Microscopic-Add On (NC) (01-22 @ 11:20)  White Blood Cell - Urine: 158 /HPF  Red Blood Cell - Urine: 1 /HPF    1/8 tissue c/s ENFA and CAGL    Culture - Blood (collected 22 Jan 2025 11:20)  Source: .Blood BLOOD  Gram Stain (23 Jan 2025 02:03):    Growth in aerobic bottle: Gram Negative Rods    Growth in anaerobic bottle: Gram Negative Rods  Preliminary Report (23 Jan 2025 02:03):    Growth in aerobic bottle: Gram Negative Rods    Growth in anaerobic bottle: Gram Negative Rods    Direct identification is available within approximately 3-5    hours either by Blood Panel Multiplexed PCR or Direct    MALDI-TOF. Details: [email or web address]  Organism: Blood Culture PCR (23 Jan 2025 01:51)  Organism: Blood Culture PCR (23 Jan 2025 01:51)      Method Type: PCR      -  Escherichia coli: Detec      -  ESBL: Detec      -  CTX-M Resistance Marker: Detec    Culture - Blood (collected 22 Jan 2025 11:20)  Source: .Blood BLOOD  Gram Stain (23 Jan 2025 02:02):    Growth in anaerobic bottle: Gram Negative Rods    Growth in aerobic bottle: Gram Negative Rods  Preliminary Report (23 Jan 2025 02:02):    Growth in anaerobic bottle: Gram Negative Rods    Growth in aerobic bottle: Gram Negative Rods    Direct identification is available within approximately 3-5    hours either by Blood Panel Multiplexed PCR or Direct    MALDI-TOF. Details: [email or web address]    Radiology: all available radiological tests reviewed    Advanced directives addressed: full resuscitation

## 2025-01-23 NOTE — CONSULT NOTE ADULT - SUBJECTIVE AND OBJECTIVE BOX
Patient is a 74y old  Female who presents with a chief complaint of     BRIEF HOSPITAL COURSE: 75 y/o F with a h/o DM, TAVR 8/2024 on Plavix , A-fib on Xarelto, CHF, HLD, PVD, b/l LE venous insufficiency, bilateral CFA stenoses s/p Rt SFA endarterectomy on 12/17/24 complicated by postoperative seroma requiring irrigation and debridement and wound VAC placement Seen on January 18 for dislodgment of wound VAC tubing, now admitted on 1/22 with fever/chills and rigors since last night. Patient reports she had felt like her usual self since her last discharge until today. She had acute onset subjective fevers, chills, rigors d/t which she came to ED. She was discharged from  on 1/18 with a course of ciprofloxacin which she reports being compliant with, reports having "5 pills" left in the course. She denies any N/V, cough, sore throat, rhinitis, chest pain, rash, diarrhea or dysuria.   In ED patient febrile with Tmax of 101.9, Soft BP in 90s/40-50s, HR in 70s. CBC noted for WBC 21, H/H 5.8/19, Plt 441. CMP noted for BUN/Cr 28/1.04. Lactate 2.5 > 3.7. F/u/COVID negative. UA cloudy, with trace ketones, mod leuks, pos nitrites, 158 wbcs. Guaiac positive. CTA of abdomen and b/l LE negative for acute hemorrhage. CXR read as "grossly clear lungs". Patient met sepsis criteria, cultures obtained and patient recieved broad spectrum antibiotics with vancomycin and ceftriaxone in ED. Patient also recieved 2L NS and ordered for 2 U pRBCs.     Events last 24 hours: Evaluated for potential upgrade to StepDown Unit due to borderline BP and inability to clear lactate. SBP currently in the 100s. Lactate 2.7. Patient is complaining of sacral pain and discomfort from the Primafit device. Blood cultures are already growing ESBL e coli.      PAST MEDICAL & SURGICAL HISTORY:  Diabetes mellitus type II, controlled      Atrial fibrillation      Congestive heart failure      Dyspnea      Morbid obesity with BMI of 40.0-44.9, adult      Neuropathy      Peripheral venous insufficiency      Thyroid nodule      HTN (hypertension)      Cellulitis      At risk for sleep apnea      History of esophagogastroduodenoscopy (EGD)      H/O colonoscopy      Other complications of gastric band procedure      S/P left knee arthroscopy      Status post medial meniscus repair      History of cholecystectomy      S/P cataract surgery        Allergies    pregabalin (Unknown)  latex (Unknown)  penicillin (Hives; Urticaria)  PC Pen VK (Rash)    Intolerances      FAMILY HISTORY:  Family history of breast cancer in mother    Family history of diabetes mellitus in mother    FHx: heart disease (Sibling)        Review of Systems:  CONSTITUTIONAL: No fever, chills, or fatigue  EYES: No eye pain, visual disturbances, or discharge  ENMT:  No difficulty hearing, tinnitus, vertigo; No sinus or throat pain  NECK: No pain or stiffness  RESPIRATORY: No cough, wheezing, chills or hemoptysis; No shortness of breath  CARDIOVASCULAR: No chest pain, palpitations, dizziness, or leg swelling  GASTROINTESTINAL: No abdominal or epigastric pain. No nausea, vomiting, or hematemesis; No diarrhea or constipation. No melena or hematochezia.  GENITOURINARY: No dysuria, frequency, hematuria, or incontinence  NEUROLOGICAL: No headaches, memory loss, loss of strength, numbness, or tremors  SKIN: No itching, burning, rashes, or lesions   MUSCULOSKELETAL: No joint pain or swelling;   PSYCHIATRIC: No depression, anxiety, mood swings, or difficulty sleeping      Social History: ***      Medications:  meropenem Injectable 1000 milliGRAM(s) IV Push every 8 hours  vancomycin  IVPB 1250 milliGRAM(s) IV Intermittent every 12 hours    aMIOdarone    Tablet 200 milliGRAM(s) Oral daily  furosemide    Tablet 40 milliGRAM(s) Oral daily    montelukast 10 milliGRAM(s) Oral at bedtime    acetaminophen     Tablet .. 650 milliGRAM(s) Oral every 6 hours PRN  melatonin 3 milliGRAM(s) Oral at bedtime PRN  ondansetron Injectable 4 milliGRAM(s) IV Push every 6 hours PRN  oxyCODONE    IR 2.5 milliGRAM(s) Oral every 4 hours PRN  zolpidem 5 milliGRAM(s) Oral at bedtime PRN        aluminum hydroxide/magnesium hydroxide/simethicone Suspension 30 milliLiter(s) Oral every 4 hours PRN  bisacodyl 5 milliGRAM(s) Oral daily PRN  pantoprazole  Injectable 40 milliGRAM(s) IV Push every 12 hours  polyethylene glycol 3350 17 Gram(s) Oral daily  senna 2 Tablet(s) Oral at bedtime      atorvastatin 10 milliGRAM(s) Oral at bedtime    multivitamin/minerals 1 Tablet(s) Oral daily  thiamine 100 milliGRAM(s) Oral daily        naloxone Injectable 0.4 milliGRAM(s) IV Push once          ICU Vital Signs Last 24 Hrs  T(C): 36.8 (23 Jan 2025 01:45), Max: 39.5 (22 Jan 2025 12:01)  T(F): 98.3 (23 Jan 2025 01:45), Max: 103.1 (22 Jan 2025 12:01)  HR: 61 (23 Jan 2025 01:45) (60 - 82)  BP: 91/52 (23 Jan 2025 01:45) (91/52 - 136/98)  BP(mean): 65 (23 Jan 2025 01:45) (63 - 112)  ABP: --  ABP(mean): --  RR: 17 (23 Jan 2025 01:45) (17 - 26)  SpO2: 100% (23 Jan 2025 01:45) (97% - 100%)    O2 Parameters below as of 23 Jan 2025 01:45  Patient On (Oxygen Delivery Method): nasal cannula  O2 Flow (L/min): 2        Vital Signs Last 24 Hrs  T(C): 36.8 (23 Jan 2025 01:45), Max: 39.5 (22 Jan 2025 12:01)  T(F): 98.3 (23 Jan 2025 01:45), Max: 103.1 (22 Jan 2025 12:01)  HR: 61 (23 Jan 2025 01:45) (60 - 82)  BP: 91/52 (23 Jan 2025 01:45) (91/52 - 136/98)  BP(mean): 65 (23 Jan 2025 01:45) (63 - 112)  RR: 17 (23 Jan 2025 01:45) (17 - 26)  SpO2: 100% (23 Jan 2025 01:45) (97% - 100%)    Parameters below as of 23 Jan 2025 01:45  Patient On (Oxygen Delivery Method): nasal cannula  O2 Flow (L/min): 2          I&O's Detail    22 Jan 2025 07:01  -  23 Jan 2025 02:57  --------------------------------------------------------  IN:    PRBCs (Packed Red Blood Cells): 347 mL  Total IN: 347 mL    OUT:  Total OUT: 0 mL    Total NET: 347 mL            LABS:                        6.3    16.05 )-----------( 277      ( 22 Jan 2025 21:47 )             21.8     01-22    137  |  108  |  24[H]  ----------------------------<  134[H]  3.6   |  21[L]  |  1.38[H]    Ca    8.1[L]      22 Jan 2025 21:47    TPro  6.5  /  Alb  2.5[L]  /  TBili  0.7  /  DBili  x   /  AST  13[L]  /  ALT  <6[L]  /  AlkPhos  59  01-22          CAPILLARY BLOOD GLUCOSE        PT/INR - ( 22 Jan 2025 11:22 )   PT: 25.8 sec;   INR: 2.26 ratio         PTT - ( 22 Jan 2025 11:22 )  PTT:33.6 sec  Urinalysis Basic - ( 22 Jan 2025 21:47 )    Color: x / Appearance: x / SG: x / pH: x  Gluc: 134 mg/dL / Ketone: x  / Bili: x / Urobili: x   Blood: x / Protein: x / Nitrite: x   Leuk Esterase: x / RBC: x / WBC x   Sq Epi: x / Non Sq Epi: x / Bacteria: x      CULTURES:  Culture Results:   Growth in aerobic bottle: Gram Negative Rods  Growth in anaerobic bottle: Gram Negative Rods  Direct identification is available within approximately 3-5  hours either by Blood Panel Multiplexed PCR or Direct  MALDI-TOF. Details: [email or web address] (01-22 @ 11:20)  Culture Results:   Growth in anaerobic bottle: Gram Negative Rods  Growth in aerobic bottle: Gram Negative Rods  Direct identification is available within approximately 3-5  hours either by Blood Panel Multiplexed PCR or Direct  MALDI-TOF. Details: [email or web address] (01-22 @ 11:20)        Physical Examination:    General: acutely distressed secondary to pan.  Alert, oriented, interactive, nonfocal, obese    HEENT: Pupils equal, reactive to light.  Symmetric.    PULM: Clear to auscultation bilaterally, no significant sputum production    CVS: Regular rate and rhythm, no murmurs, rubs, or gallops    ABD: Soft, nondistended, nontender, normoactive bowel sounds, no masses    EXT: No edema, right groin wound vac in place    SKIN: Warm and well perfused, chronic venous stasis changes on b/l LEs    NEURO: A&Ox3, strength 5/5 all extremities, cranial nerves grossly intact, no focal deficits      RADIOLOGY:     < from: CT Angio Abdomen and Pelvis w/ IV Cont (01.22.25 @ 13:47) >  FINDINGS:  LOWER CHEST: Status post TAVR. Mitral annulus calcification. Decreased   attenuation of the vascular blood pool, compatible with anemia. The lung   bases are clear.    LIVER: Again is noted a lobular cyst in the right lobe of the liver   laterally. Otherwise, within normal limits.  BILE DUCTS: Normal caliber.  GALLBLADDER: Removed.  SPLEEN: Within normal limits.  PANCREAS: Within normal limits.  ADRENALS: Within normal limits.  KIDNEYS/URETERS: Small bilateral renal cysts. Otherwise, within normal   limits.    BLADDER: Minimally distended.  REPRODUCTIVE ORGANS: Uterus and adnexa within normal limits.    BOWEL: Sigmoid diverticulosis. No bowel obstruction. Appendix normal.  PERITONEUM: No ascites. There is no evidence of hyperattenuating   collection within the peritoneal cavity or retroperitoneum to suggest   acute hemorrhage.  VESSELS: Extensive atherosclerotic disease of the nonaneurysmal abdominal   aorta. Status post endarterectomy of the right proximal focal femoral   artery.    On the right, there is moderate stenosis of the proximal common iliac   artery, severe stenosis of the common femoral artery, and diffuse   multifocal moderate to severe stenosis of the superficial femoral artery,   popliteal artery, trifurcation, and calf arteries. There is diminutive   two-vessel runoff to the foot with an occluded posterior tibial artery.    On the left, there is moderate stenosis of the proximal common iliac   artery, common femoral artery, and diffuse multifocal moderate stenosis   of the superficial femoral artery, popliteal artery, trifurcation, and   calf arteries. There is diminutive two-vessel runoff to the foot with an   occluded posterior tibial artery.    The IVC and pelvic veins opacify unremarkably.    RETROPERITONEUM/LYMPH NODES: No lymphadenopathy.  ABDOMINAL WALL: Status post I&D of the right groin with a residual   tracking peripherally enhancing 5.3 x 2.4 cm collection in the  subcutaneous tissues. Fat-containing umbilical hernia.  BONES: There is moderate soft tissue swelling of the dorsum of the left   foot. No subcutaneous gas is identified. Degenerative disc disease and   spondylosis of the lumbar spine.    IMPRESSION: No evidence of acute hemorrhage in the abdomen, pelvis, and   bilateral lower extremities.. Status post I&D of right groin collection.    < end of copied text >

## 2025-01-23 NOTE — PROGRESS NOTE ADULT - SUBJECTIVE AND OBJECTIVE BOX
Chief Complaint: Patient is a 74y old  Female who presents with a chief complaint of Anemia     (23 Jan 2025 11:12)      Interval events:   - No acute events overnight, VSS, pt afebrile   - BCx growing ESBL E. Coli. UCx remains pending, seen by ID   - Hgb 5.8 -> 7.9 s/p 3U PRBC. Xarelto / plavix remain held pending GI eval     ROS:   Patient denies any current chest pain, SOB, cough, f/c/n/v/d, abd pain, LE edema, myalgias, dysuria, HA, dizziness  All other review of systems is negative unless indicated above    Physical Exam:  Vital Signs Last 24 Hrs  T(C): 37.1 (23 Jan 2025 12:11), Max: 38.8 (22 Jan 2025 16:15)  T(F): 98.8 (23 Jan 2025 12:11), Max: 101.9 (22 Jan 2025 16:15)  HR: 78 (23 Jan 2025 12:11) (60 - 90)  BP: 101/37 (23 Jan 2025 12:11) (91/52 - 160/62)  BP(mean): 99 (23 Jan 2025 11:28) (63 - 99)  RR: 18 (23 Jan 2025 12:11) (14 - 24)  SpO2: 99% (23 Jan 2025 12:11) (94% - 100%)    Parameters below as of 23 Jan 2025 12:11  Patient On (Oxygen Delivery Method): nasal cannula  O2 Flow (L/min): 2    Constitutional: NAD, awake and alert, obese, chronically ill appearing   HEENT: PERRLA, EOMI, MMM  Respiratory: Breath sounds are clear bilaterally, No wheezing, rales or rhonchi  Cardiovascular: S1 and S2, RRR, no murmurs, gallops or rubs  Gastrointestinal: +BS, soft, non-tender, non-distended, no CVA tenderness  Extremities: No peripheral edema, +DP pulses b/l  Neurological: A&O x 3, no focal deficits  Musculoskeletal: 5/5 strength b/l upper and lower extremities  Skin: Chronic LE venous stasis changes, +R groin wound foul smelling, skin warm and dry    Labs:  01-23 @ 06:26  Glucose 96 mg/dL  HCO3 22 mmol/L  Chloride 105 mmol/L  Sodium 132 mmol/L>   Potassium 4.7 mmol/L  Creatinine 1.21 mg/dL  Calcium 8.3 mg/dL  BUN 25 mg/dL  eGFR 47 mL/min/1.73m2  Anion gap 5 mmol/L    WBC 17.46  Hemoglobin 7.9  Hemoatocrit 25.7  Platelet count 286      PT/INR - ( 22 Jan 2025 11:22 )   PT: 25.8 sec;   INR: 2.26 ratio         PTT - ( 22 Jan 2025 11:22 )  PTT:33.6 sec    Cardiac testing:  Troponin I, High Sensitivity Result: 397.07 ng/L (01-23-25 @ 10:36)  Troponin I, High Sensitivity Result: 280.79 ng/L (01-23-25 @ 06:26)  Troponin I, High Sensitivity Result: 187.08 ng/L (01-23-25 @ 00:25)  Troponin I, High Sensitivity Result: 174.85 ng/L (01-22-25 @ 21:47)    12 Lead ECG:   Ventricular Rate 72 BPM    Atrial Rate 72 BPM    P-R Interval 192 ms    QRS Duration 110 ms    Q-T Interval 452 ms    QTC Calculation(Bazett) 494 ms    P Axis 63 degrees    R Axis -41 degrees    T Axis 49 degrees    Diagnosis Line Normal sinus rhythm  Left axis deviation  Anterior infarct (cited on or before 22-JAN-2025)  Abnormal ECG  When compared with ECG of 03-JAN-2025 14:06,  Vent. rate has increased BY  26 BPM  ST now depressed in Inferior leads  Nonspecific T wave abnormality now evidentin Lateral leads  Confirmed by MD BRIANA, NEW ([id number]) on 1/22/2025 8:19:38 PM (01-22-25 @ 12:12)      Micro:  BCx 1/22 -- ESBL E. coli   UCx -- pending    Radiology:  < from: Xray Chest 1 View-PORTABLE IMMEDIATE (01.22.25 @ 12:47) >  IMPRESSION: Grossly clear lungs at this time.    < end of copied text >  < from: CT Angio Lower Extremity w/ IV Cont, Bilateral (01.22.25 @ 13:45) >  IMPRESSION: No evidence of acute hemorrhage in the abdomen, pelvis, and   bilateral lower extremities.. Status post I&D of right groin collection.    < end of copied text >  < from: CT Angio Abdomen and Pelvis w/ IV Cont (01.22.25 @ 13:47) >  IMPRESSION: No evidence of acute hemorrhage in the abdomen, pelvis, and   bilateral lower extremities.. Status post I&D of right groin collection.    < end of copied text >    Medications:  MEDICATIONS  (STANDING):  aMIOdarone    Tablet 200 milliGRAM(s) Oral daily  atorvastatin 10 milliGRAM(s) Oral at bedtime  caspofungin IVPB      furosemide    Tablet 40 milliGRAM(s) Oral daily  influenza  Vaccine (HIGH DOSE) 0.5 milliLiter(s) IntraMuscular once  meropenem Injectable 1000 milliGRAM(s) IV Push every 8 hours  montelukast 10 milliGRAM(s) Oral at bedtime  multivitamin/minerals 1 Tablet(s) Oral daily  naloxone Injectable 0.4 milliGRAM(s) IV Push once  pantoprazole  Injectable 40 milliGRAM(s) IV Push every 12 hours  polyethylene glycol 3350 17 Gram(s) Oral daily  senna 2 Tablet(s) Oral at bedtime  thiamine 100 milliGRAM(s) Oral daily  vancomycin  IVPB 1250 milliGRAM(s) IV Intermittent every 12 hours    MEDICATIONS  (PRN):  acetaminophen     Tablet .. 650 milliGRAM(s) Oral every 6 hours PRN Temp greater or equal to 38C (100.4F)  aluminum hydroxide/magnesium hydroxide/simethicone Suspension 30 milliLiter(s) Oral every 4 hours PRN Dyspepsia  bisacodyl 5 milliGRAM(s) Oral daily PRN Constipation  melatonin 3 milliGRAM(s) Oral at bedtime PRN Insomnia  ondansetron Injectable 4 milliGRAM(s) IV Push every 6 hours PRN Nausea and/or Vomiting  oxyCODONE    IR 5 milliGRAM(s) Oral every 6 hours PRN Moderate Pain (4 - 6)  oxyCODONE    IR 10 milliGRAM(s) Oral every 6 hours PRN Severe Pain (7 - 10)  zolpidem 5 milliGRAM(s) Oral at bedtime PRN Insomnia      Time based billing:   > 51 minutes of total time met or exceeded on this patient encounter. Time spent excludes time spent on separately reportable services/teaching during the encounter.

## 2025-01-24 LAB
-  AMPICILLIN/SULBACTAM: SIGNIFICANT CHANGE UP
-  AMPICILLIN: SIGNIFICANT CHANGE UP
-  AZTREONAM: SIGNIFICANT CHANGE UP
-  CEFAZOLIN: SIGNIFICANT CHANGE UP
-  CEFEPIME: SIGNIFICANT CHANGE UP
-  CEFTRIAXONE: SIGNIFICANT CHANGE UP
-  CEFUROXIME: SIGNIFICANT CHANGE UP
-  CEFUROXIME: SIGNIFICANT CHANGE UP
-  CIPROFLOXACIN: SIGNIFICANT CHANGE UP
-  ERTAPENEM: SIGNIFICANT CHANGE UP
-  GENTAMICIN: SIGNIFICANT CHANGE UP
-  IMIPENEM: SIGNIFICANT CHANGE UP
-  LEVOFLOXACIN: SIGNIFICANT CHANGE UP
-  MEROPENEM: SIGNIFICANT CHANGE UP
-  NITROFURANTOIN: SIGNIFICANT CHANGE UP
-  NITROFURANTOIN: SIGNIFICANT CHANGE UP
-  PIPERACILLIN/TAZOBACTAM: SIGNIFICANT CHANGE UP
-  TOBRAMYCIN: SIGNIFICANT CHANGE UP
-  TRIMETHOPRIM/SULFAMETHOXAZOLE: SIGNIFICANT CHANGE UP
ALBUMIN SERPL ELPH-MCNC: 2.2 G/DL — LOW (ref 3.3–5)
ALP SERPL-CCNC: 54 U/L — SIGNIFICANT CHANGE UP (ref 40–120)
ALT FLD-CCNC: 9 U/L — LOW (ref 12–78)
ANION GAP SERPL CALC-SCNC: 5 MMOL/L — SIGNIFICANT CHANGE UP (ref 5–17)
APTT BLD: 30.8 SEC — SIGNIFICANT CHANGE UP (ref 24.5–35.6)
APTT BLD: 47.8 SEC — HIGH (ref 24.5–35.6)
AST SERPL-CCNC: 20 U/L — SIGNIFICANT CHANGE UP (ref 15–37)
BILIRUB SERPL-MCNC: 0.6 MG/DL — SIGNIFICANT CHANGE UP (ref 0.2–1.2)
BUN SERPL-MCNC: 19 MG/DL — SIGNIFICANT CHANGE UP (ref 7–23)
CALCIUM SERPL-MCNC: 8.2 MG/DL — LOW (ref 8.5–10.1)
CHLORIDE SERPL-SCNC: 104 MMOL/L — SIGNIFICANT CHANGE UP (ref 96–108)
CO2 SERPL-SCNC: 26 MMOL/L — SIGNIFICANT CHANGE UP (ref 22–31)
CREAT SERPL-MCNC: 0.82 MG/DL — SIGNIFICANT CHANGE UP (ref 0.5–1.3)
CULTURE RESULTS: ABNORMAL
EGFR: 75 ML/MIN/1.73M2 — SIGNIFICANT CHANGE UP
GLUCOSE SERPL-MCNC: 95 MG/DL — SIGNIFICANT CHANGE UP (ref 70–99)
HCT VFR BLD CALC: 24.2 % — LOW (ref 34.5–45)
HCT VFR BLD CALC: 27 % — LOW (ref 34.5–45)
HCT VFR BLD CALC: 27.4 % — LOW (ref 34.5–45)
HGB BLD-MCNC: 7.2 G/DL — LOW (ref 11.5–15.5)
HGB BLD-MCNC: 8.4 G/DL — LOW (ref 11.5–15.5)
HGB BLD-MCNC: 8.4 G/DL — LOW (ref 11.5–15.5)
INR BLD: 1.33 RATIO — HIGH (ref 0.85–1.16)
MAGNESIUM SERPL-MCNC: 1.5 MG/DL — LOW (ref 1.6–2.6)
MCHC RBC-ENTMCNC: 26 PG — LOW (ref 27–34)
MCHC RBC-ENTMCNC: 26.6 PG — LOW (ref 27–34)
MCHC RBC-ENTMCNC: 26.9 PG — LOW (ref 27–34)
MCHC RBC-ENTMCNC: 29.8 G/DL — LOW (ref 32–36)
MCHC RBC-ENTMCNC: 30.7 G/DL — LOW (ref 32–36)
MCHC RBC-ENTMCNC: 31.1 G/DL — LOW (ref 32–36)
MCV RBC AUTO: 86.5 FL — SIGNIFICANT CHANGE UP (ref 80–100)
MCV RBC AUTO: 86.7 FL — SIGNIFICANT CHANGE UP (ref 80–100)
MCV RBC AUTO: 87.4 FL — SIGNIFICANT CHANGE UP (ref 80–100)
METHOD TYPE: SIGNIFICANT CHANGE UP
ORGANISM # SPEC MICROSCOPIC CNT: ABNORMAL
ORGANISM # SPEC MICROSCOPIC CNT: SIGNIFICANT CHANGE UP
ORGANISM # SPEC MICROSCOPIC CNT: SIGNIFICANT CHANGE UP
PLATELET # BLD AUTO: 242 K/UL — SIGNIFICANT CHANGE UP (ref 150–400)
PLATELET # BLD AUTO: 259 K/UL — SIGNIFICANT CHANGE UP (ref 150–400)
PLATELET # BLD AUTO: 268 K/UL — SIGNIFICANT CHANGE UP (ref 150–400)
POTASSIUM SERPL-MCNC: 3.2 MMOL/L — LOW (ref 3.5–5.3)
POTASSIUM SERPL-SCNC: 3.2 MMOL/L — LOW (ref 3.5–5.3)
PROT SERPL-MCNC: 6 GM/DL — SIGNIFICANT CHANGE UP (ref 6–8.3)
PROTHROM AB SERPL-ACNC: 15.3 SEC — HIGH (ref 9.9–13.4)
RBC # BLD: 2.77 M/UL — LOW (ref 3.8–5.2)
RBC # BLD: 3.12 M/UL — LOW (ref 3.8–5.2)
RBC # BLD: 3.16 M/UL — LOW (ref 3.8–5.2)
RBC # FLD: 16.7 % — HIGH (ref 10.3–14.5)
RBC # FLD: 16.9 % — HIGH (ref 10.3–14.5)
RBC # FLD: 17.4 % — HIGH (ref 10.3–14.5)
SODIUM SERPL-SCNC: 135 MMOL/L — SIGNIFICANT CHANGE UP (ref 135–145)
SPECIMEN SOURCE: SIGNIFICANT CHANGE UP
WBC # BLD: 12.23 K/UL — HIGH (ref 3.8–10.5)
WBC # BLD: 13.35 K/UL — HIGH (ref 3.8–10.5)
WBC # BLD: 14.07 K/UL — HIGH (ref 3.8–10.5)
WBC # FLD AUTO: 12.23 K/UL — HIGH (ref 3.8–10.5)
WBC # FLD AUTO: 13.35 K/UL — HIGH (ref 3.8–10.5)
WBC # FLD AUTO: 14.07 K/UL — HIGH (ref 3.8–10.5)

## 2025-01-24 PROCEDURE — 99222 1ST HOSP IP/OBS MODERATE 55: CPT

## 2025-01-24 PROCEDURE — 99233 SBSQ HOSP IP/OBS HIGH 50: CPT

## 2025-01-24 PROCEDURE — 99232 SBSQ HOSP IP/OBS MODERATE 35: CPT | Mod: FS

## 2025-01-24 PROCEDURE — 99497 ADVNCD CARE PLAN 30 MIN: CPT | Mod: 25

## 2025-01-24 PROCEDURE — 99232 SBSQ HOSP IP/OBS MODERATE 35: CPT

## 2025-01-24 RX ORDER — HEPARIN SODIUM,PORCINE 10000/ML
4000 VIAL (ML) INJECTION EVERY 6 HOURS
Refills: 0 | Status: DISCONTINUED | OUTPATIENT
Start: 2025-01-24 | End: 2025-01-28

## 2025-01-24 RX ORDER — HEPARIN SODIUM,PORCINE 10000/ML
9000 VIAL (ML) INJECTION EVERY 6 HOURS
Refills: 0 | Status: DISCONTINUED | OUTPATIENT
Start: 2025-01-24 | End: 2025-01-24

## 2025-01-24 RX ORDER — HEPARIN SODIUM,PORCINE 10000/ML
VIAL (ML) INJECTION
Qty: 25000 | Refills: 0 | Status: DISCONTINUED | OUTPATIENT
Start: 2025-01-24 | End: 2025-01-27

## 2025-01-24 RX ORDER — HEPARIN SODIUM,PORCINE 10000/ML
4500 VIAL (ML) INJECTION EVERY 6 HOURS
Refills: 0 | Status: DISCONTINUED | OUTPATIENT
Start: 2025-01-24 | End: 2025-01-24

## 2025-01-24 RX ORDER — POTASSIUM CHLORIDE 750 MG/1
40 TABLET, EXTENDED RELEASE ORAL EVERY 4 HOURS
Refills: 0 | Status: COMPLETED | OUTPATIENT
Start: 2025-01-24 | End: 2025-01-24

## 2025-01-24 RX ORDER — HEPARIN SODIUM,PORCINE 10000/ML
VIAL (ML) INJECTION
Qty: 25000 | Refills: 0 | Status: DISCONTINUED | OUTPATIENT
Start: 2025-01-24 | End: 2025-01-24

## 2025-01-24 RX ORDER — MAGNESIUM SULFATE 0.8 MEQ/ML
1 AMPUL (ML) INJECTION ONCE
Refills: 0 | Status: COMPLETED | OUTPATIENT
Start: 2025-01-24 | End: 2025-01-24

## 2025-01-24 RX ORDER — HEPARIN SODIUM,PORCINE 10000/ML
8500 VIAL (ML) INJECTION EVERY 6 HOURS
Refills: 0 | Status: DISCONTINUED | OUTPATIENT
Start: 2025-01-24 | End: 2025-01-28

## 2025-01-24 RX ADMIN — Medication 100 GRAM(S): at 14:34

## 2025-01-24 RX ADMIN — Medication 4000 UNIT(S): at 21:49

## 2025-01-24 RX ADMIN — Medication 5000 UNIT(S): at 05:28

## 2025-01-24 RX ADMIN — Medication 100 MILLIGRAM(S): at 09:26

## 2025-01-24 RX ADMIN — Medication 1 TABLET(S): at 09:26

## 2025-01-24 RX ADMIN — MEROPENEM 1000 MILLIGRAM(S): 500 INJECTION INTRAVENOUS at 05:29

## 2025-01-24 RX ADMIN — ATORVASTATIN CALCIUM 10 MILLIGRAM(S): 80 TABLET, FILM COATED ORAL at 22:52

## 2025-01-24 RX ADMIN — Medication 2 TABLET(S): at 22:54

## 2025-01-24 RX ADMIN — Medication 0.5 MILLIGRAM(S): at 14:50

## 2025-01-24 RX ADMIN — VANCOMYCIN HYDROCHLORIDE 166.67 MILLIGRAM(S): KIT at 00:42

## 2025-01-24 RX ADMIN — OXYCODONE HYDROCHLORIDE 10 MILLIGRAM(S): 30 TABLET ORAL at 09:31

## 2025-01-24 RX ADMIN — CASPOFUNGIN ACETATE 260 MILLIGRAM(S): 5 INJECTION, POWDER, LYOPHILIZED, FOR SOLUTION INTRAVENOUS at 09:33

## 2025-01-24 RX ADMIN — POLYETHYLENE GLYCOL 3350 17 GRAM(S): 17 POWDER, FOR SOLUTION ORAL at 12:02

## 2025-01-24 RX ADMIN — Medication 1800 UNIT(S)/HR: at 19:36

## 2025-01-24 RX ADMIN — POTASSIUM CHLORIDE 40 MILLIEQUIVALENT(S): 750 TABLET, EXTENDED RELEASE ORAL at 14:35

## 2025-01-24 RX ADMIN — ZOLPIDEM TARTRATE 5 MILLIGRAM(S): 5 TABLET, COATED ORAL at 23:18

## 2025-01-24 RX ADMIN — VANCOMYCIN HYDROCHLORIDE 166.67 MILLIGRAM(S): KIT at 09:32

## 2025-01-24 RX ADMIN — Medication 2000 UNIT(S)/HR: at 21:45

## 2025-01-24 RX ADMIN — MEROPENEM 1000 MILLIGRAM(S): 500 INJECTION INTRAVENOUS at 22:53

## 2025-01-24 RX ADMIN — PANTOPRAZOLE 40 MILLIGRAM(S): 20 TABLET, DELAYED RELEASE ORAL at 05:28

## 2025-01-24 RX ADMIN — POTASSIUM CHLORIDE 40 MILLIEQUIVALENT(S): 750 TABLET, EXTENDED RELEASE ORAL at 09:31

## 2025-01-24 RX ADMIN — MIDODRINE HYDROCHLORIDE 5 MILLIGRAM(S): 5 TABLET ORAL at 00:43

## 2025-01-24 RX ADMIN — OXYCODONE HYDROCHLORIDE 5 MILLIGRAM(S): 30 TABLET ORAL at 00:43

## 2025-01-24 RX ADMIN — OXYCODONE HYDROCHLORIDE 10 MILLIGRAM(S): 30 TABLET ORAL at 20:41

## 2025-01-24 RX ADMIN — Medication 1800 UNIT(S)/HR: at 14:50

## 2025-01-24 RX ADMIN — MONTELUKAST SODIUM 10 MILLIGRAM(S): 5 TABLET, CHEWABLE ORAL at 22:53

## 2025-01-24 RX ADMIN — MEROPENEM 1000 MILLIGRAM(S): 500 INJECTION INTRAVENOUS at 14:35

## 2025-01-24 RX ADMIN — Medication 40 MILLIGRAM(S): at 09:26

## 2025-01-24 RX ADMIN — PANTOPRAZOLE 40 MILLIGRAM(S): 20 TABLET, DELAYED RELEASE ORAL at 17:18

## 2025-01-24 RX ADMIN — AMIODARONE HYDROCHLORIDE 200 MILLIGRAM(S): 50 INJECTION, SOLUTION INTRAVENOUS at 09:26

## 2025-01-24 NOTE — PROGRESS NOTE ADULT - ASSESSMENT
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 Patient reports she had felt like her usual self since her last discharge until today. She had acute onset subjective fevers, chills, rigors d/t which she came to ED. In ED patient febrile with Tmax of 101.9, Soft BP in 90s/40-50s, HR in 70s. CBC noted for WBC 21, H/H 5.8/19. Admitted for:    #Sepsis 2/2 ESBL E. Coli bacteremia , ESBL E. Coli UTI  #Right inguinal postsurgical bacterial and fungal infection s/p right SFA endarterectomy s/p collection with ENFA and CAGL  - Consulted ICU in ED, patient deemed stable for tele floor  - Tmax 101.9F, WBC 21 -> 14, lactate 3.7 -> 1.6  - RVP negative, CXR clear   - CT RLE as above  - Continue IV meropenem, vanco stopped per ID  - BCx and UCx 1/22 both growing ESBL E. Coli, etiology of bacteremia likely from UTI  - ID consulted , added caspofungin for recent wound cx   - Cardiology consulted, feel less likely presentation c/f endocarditis, holding off on ASHLEY at this time  - Vascular surgery following Dr. Manjarrez   - Wound vac d/c'd per vascular, continue wet to dry dressings daily  - tentative plan for OR on Tuesday for R. ground wound wash out, holding xarelto/plavix at this time, c/w hep gtt   - PRN oxycodone for pain control    #Acute blood loss anemia 2/2 GIB   - Patient on Xarelto for Afib, with guaiac positive test in ED  - CTA abd/pelvis negative for acute bleed  - C/w protonix BID  - Hgb 5.8 --> 7.9 s/p 3U PRBC -> 7.2 today , pending additional 1U PRBC  - Placed on heparin gtt to determine if can tolerate AC (xarelto/plavix remain held)   - Hold her metoprolol, cardizem, and aldactone d/t soft BP, resume when appropriate   - GI consulted, no current plan for scope at this time   - Continue to trend H/H    #Chronic heart failure with preserved ejection fraction (HFpEF)  #AS s/p TAVR  -Last ECHO done on 12/05/2024: LVEF 56%, LV cavity moderately dilated, normal wall thickness, normal LVEF, severe (grade 3) LV diastolic dysfunction, severe LA dilatation, mod to moderate RA dilatation, normal RV size and function.   -C/w her lasix, hold her metoprolol and aldactone d/t ABLA and soft BP     -Monitor weights, strict ins and outs.    #paroxsymal Atrial fibrillation   #Elevated troponin   - In sinus rhythm here.   - C/w her amiodarone.   - Holding Cardizem, metoprolol and xarelto d/t soft BP and ABLA, Resume when appropriate  - Troponin trend as above  - Cardiology following Dr Sivan Jimenez     #DM2 (diabetes mellitus)    - Last A1c 5.8 (4/2024)  - Not on any medications.   - Carb restricted diet when appropriate.    #HLD (hyperlipidemia)   - on home statin and Zetia  - C/w her statin.    # Insomnia.  - Ambien PRN    #Anxiety   - trial PO ativan 0.5mg PRN for anxiety     #DVT ppx   - SCDs. Holding Xarelto as above

## 2025-01-24 NOTE — PHYSICAL THERAPY INITIAL EVALUATION ADULT - ACTIVE RANGE OF MOTION EXAMINATION, REHAB EVAL
b/l LE hip flexion 50 deg 2/2 pain, b/l dorsiflexion WFL/Left UE Active ROM was WFL (within functional limits)/Right UE Active ROM was WFL (within functional limits)/deficits as listed below b/l LE hip flexion 50 deg 2/2 pain(4/10), b/l dorsiflexion WFL/Left UE Active ROM was WFL (within functional limits)/Right UE Active ROM was WFL (within functional limits)/deficits as listed below b/l LE hip flexion 50 deg 2/2 pain(4/10) and fatigue, b/l dorsiflexion WFL/Left UE Active ROM was WFL (within functional limits)/Right UE Active ROM was WFL (within functional limits)/deficits as listed below

## 2025-01-24 NOTE — PHYSICAL THERAPY INITIAL EVALUATION ADULT - PLANNED THERAPY INTERVENTIONS, PT EVAL
balance training/bed mobility training/gait training balance training/bed mobility training/gait training/transfer training

## 2025-01-24 NOTE — CONSULT NOTE ADULT - SUBJECTIVE AND OBJECTIVE BOX
HPI: Pt is a 74y old Female with hx of       PAIN: ( )Yes   ( )No  Level:  Location:  Intensity:    /10  Quality:  Aggravating Factors:  Alleviating Factors:  Radiation:  Duration/Timing:  Impact on ADLs:    DYSPNEA: ( ) Yes  ( ) No  Level:    PAST MEDICAL & SURGICAL HISTORY:  Diabetes mellitus type II, controlled  Atrial fibrillation  Congestive heart failure  Dyspnea  Morbid obesity with BMI of 40.0-44.9, adult  Neuropathy  Peripheral venous insufficiency  Thyroid nodule  HTN (hypertension)  Cellulitis  At risk for sleep apnea  History of esophagogastroduodenoscopy (EGD)  H/O colonoscopy  Other complications of gastric band procedure  S/P left knee arthroscopy  Status post medial meniscus repair  History of cholecystectomy  S/P cataract surgery    SOCIAL HX:     Hx opiate tolerance ( )YES  ( )NO    Baseline ADLs  (Prior to Admission)  ( ) Independent   ( )Dependent    FAMILY HISTORY:  Family history of breast cancer in mother    Family history of diabetes mellitus in mother    FHx: heart disease (Sibling)    Review of Systems:    Anxiety-  Depression-  Physical Discomfort-  Dyspnea-  Constipation-  Diarrhea-  Nausea-  Vomiting-  Anorexia-  Weight Loss-   Cough-  Secretions-  Fatigue-  Weakness-  Delirium-    All other systems reviewed and negative  Unable to obtain/Limited due to:    PHYSICAL EXAM:    Vital Signs Last 24 Hrs  T(C): 36.3 (2025 08:51), Max: 37.2 (2025 17:35)  T(F): 97.4 (2025 08:51), Max: 98.9 (2025 17:35)  HR: 55 (2025 08:51) (55 - 90)  BP: 124/45 (2025 08:51) (101/37 - 150/87)  BP(mean): 99 (2025 11:28) (99 - 99)  RR: 18 (2025 08:51) (18 - 24)  SpO2: 94% (2025 08:51) (94% - 99%)    Parameters below as of 2025 08:51  Patient On (Oxygen Delivery Method): nasal cannula  O2 Flow (L/min): 2     Daily Weight in k.3 (2025 06:23)    PPSV2:   %  FAST:    General:  Mental Status:  HEENT:  Lungs:  Cardiac:  GI:  :  Ext:  Neuro:      LABS:                        7.2    14.07 )-----------( 242      ( 2025 07:04 )             24.2         135  |  104  |  19  ----------------------------<  95  3.2[L]   |  26  |  0.82    Ca    8.2[L]      2025 07:04  Phos  4.3       Mg     1.5         TPro  6.0  /  Alb  2.2[L]  /  TBili  0.6  /  DBili  x   /  AST  20  /  ALT  9[L]  /  AlkPhos  54      PT/INR - ( 2025 11:22 )   PT: 25.8 sec;   INR: 2.26 ratio         PTT - ( 2025 11:22 )  PTT:33.6 sec  Albumin: Albumin: 2.2 g/dL ( @ 07:04)      Allergies    pregabalin (Unknown)  latex (Unknown)  penicillin (Hives; Urticaria)  PC Pen VK (Rash)    Intolerances      MEDICATIONS  (STANDING):  aMIOdarone    Tablet 200 milliGRAM(s) Oral daily  atorvastatin 10 milliGRAM(s) Oral at bedtime  caspofungin IVPB      caspofungin IVPB 50 milliGRAM(s) IV Intermittent every 24 hours  furosemide    Tablet 40 milliGRAM(s) Oral daily  heparin   Injectable 5000 Unit(s) SubCutaneous every 8 hours  influenza  Vaccine (HIGH DOSE) 0.5 milliLiter(s) IntraMuscular once  magnesium sulfate  IVPB 1 Gram(s) IV Intermittent once  meropenem Injectable 1000 milliGRAM(s) IV Push every 8 hours  montelukast 10 milliGRAM(s) Oral at bedtime  multivitamin/minerals 1 Tablet(s) Oral daily  naloxone Injectable 0.4 milliGRAM(s) IV Push once  pantoprazole  Injectable 40 milliGRAM(s) IV Push every 12 hours  polyethylene glycol 3350 17 Gram(s) Oral daily  potassium chloride    Tablet ER 40 milliEquivalent(s) Oral every 4 hours  senna 2 Tablet(s) Oral at bedtime  thiamine 100 milliGRAM(s) Oral daily  vancomycin  IVPB 1250 milliGRAM(s) IV Intermittent every 12 hours    MEDICATIONS  (PRN):  acetaminophen     Tablet .. 650 milliGRAM(s) Oral every 6 hours PRN Temp greater or equal to 38C (100.4F)  aluminum hydroxide/magnesium hydroxide/simethicone Suspension 30 milliLiter(s) Oral every 4 hours PRN Dyspepsia  bisacodyl 5 milliGRAM(s) Oral daily PRN Constipation  melatonin 3 milliGRAM(s) Oral at bedtime PRN Insomnia  ondansetron Injectable 4 milliGRAM(s) IV Push every 6 hours PRN Nausea and/or Vomiting  oxyCODONE    IR 5 milliGRAM(s) Oral every 6 hours PRN Moderate Pain (4 - 6)  oxyCODONE    IR 10 milliGRAM(s) Oral every 6 hours PRN Severe Pain (7 - 10)  zolpidem 5 milliGRAM(s) Oral at bedtime PRN Insomnia      RADIOLOGY/ADDITIONAL STUDIES:    ACC: [id number] EXAM:  CT ANGIO LWR EXT (W)AW IC BI   ORDERED BY: REJI DAVIES   ACC: [id number] EXAM:  CT ANGIO ABD PELV (W)AW IC   ORDERED BY: REJI DAVIES   PROCEDURE DATE:  2025    INTERPRETATION:  CLINICAL INFORMATION: Sepsis. Decreased hemoglobin.   Right superficial femoral artery endarterectomy 2024, right   groin excisional debridement.  COMPARISON: Contrast-enhanced CT of the abdomen and pelvis 2024.  CONTRAST/COMPLICATIONS:  IV Contrast: Omnipaque 350 (accession [id number]), IV contrast documented   in unlinked concurrent exam (accession [id number])  125 cc administered   0   cc discarded  Oral Contrast: NONE  CT ANGIOGRAM ABDOMEN, PELVIS, AND LOWER EXTREMITIES:  PROCEDURE:  Initially, nonenhanced CT was obtained. Then, following the rapid   administration of intravenous contrast, CT angiography was performed   through the abdomen, pelvis, and lower extremities down to the toes.    Delayed images were also obtained. Sagittal and coronal reformats as well   as 3D reconstructions were performed.    FINDINGS:  LOWER CHEST: Status post TAVR. Mitral annulus calcification. Decreased   attenuation of the vascular blood pool, compatible with anemia. The lung   bases are clear.    LIVER: Again is noted a lobular cyst in the right lobe of the liver   laterally. Otherwise, within normal limits.  BILE DUCTS: Normal caliber.  GALLBLADDER: Removed.  SPLEEN: Within normal limits.  PANCREAS: Within normal limits.  ADRENALS: Within normal limits.  KIDNEYS/URETERS: Small bilateral renal cysts. Otherwise, within normal   limits.    BLADDER: Minimally distended.  REPRODUCTIVE ORGANS: Uterus and adnexa within normal limits.    BOWEL: Sigmoid diverticulosis. No bowel obstruction. Appendix normal.  PERITONEUM: No ascites. There is no evidence of hyperattenuating   collection within the peritoneal cavity or retroperitoneum to suggest   acute hemorrhage.  VESSELS: Extensive atherosclerotic disease of the nonaneurysmal abdominal   aorta. Status post endarterectomy of the right proximal focal femoral   artery.    On the right, there is moderate stenosis of the proximal common iliac   artery, severe stenosis of the common femoral artery, and diffuse   multifocal moderate to severe stenosis of the superficial femoral artery,   popliteal artery, trifurcation, and calf arteries. There is diminutive   two-vessel runoff to the foot with an occluded posterior tibial artery.    On the left, there is moderate stenosis of the proximal common iliac   artery, common femoral artery, and diffuse multifocal moderate stenosis   of the superficial femoral artery, popliteal artery, trifurcation, and   calf arteries. There is diminutive two-vessel runoff to the foot with an   occluded posterior tibial artery.    The IVC and pelvic veins opacify unremarkably.    RETROPERITONEUM/LYMPH NODES: No lymphadenopathy.  ABDOMINAL WALL: Status post I&D of the right groin with a residual   tracking peripherally enhancing 5.3 x 2.4 cm collection in the  subcutaneous tissues. Fat-containing umbilical hernia.  BONES: There is moderate soft tissue swelling of the dorsum of the left   foot. No subcutaneous gas is identified. Degenerative disc disease and   spondylosis of the lumbar spine.    IMPRESSION: No evidence of acute hemorrhage in the abdomen, pelvis, and   bilateral lower extremities.. Status post I&D of right groin collection.      ACC: [id number] EXAM:  XR CHEST PORTABLE IMMED 1V   ORDERED BY: REJI DAVIES   PROCEDURE DATE:  2025    INTERPRETATION:  AP chest on 2025 at 12:28 PM. Patient has sepsis.  Heart magnified by technique and [place] aortic valve   microcalcifications again noted.  On 2024 there is a slight infiltrate at right base medially.  Presently lungs are grossly clear.    IMPRESSION: Grossly clear lungs at this time.     HPI: Pt is a 74y old Female with hx of DM, TAVR 2024 on Plavix , A-fib on Xarelto, CHF, HLD, PVD, b/l LE venous insufficiency, bilateral CFA stenoses s/p Rt SFA endarterectomy on 24 complicated by postoperative seroma requiring irrigation and debridement and wound VAC placement Seen on  for dislodgment of wound VAC tubing presents for evaluation of fever/chills and rigors since last night. Patient reports she had felt like her usual self since her last discharge until today. She had acute onset subjective fevers, chills, rigors d/t which she came to ED. In ED patient febrile with Tmax of 101.9, Soft BP in 90s/40-50s, HR in 70s. CBC noted for WBC 21, H/H 5.8/19. Palliative medicine consulted to help establish GOC and advance care planing     2025 pt seen and examined with no family at bedside, patient states that she is uncomfortable all over but unable to further describe. Denies any dyspnea at this time     PAIN: (x )Yes   ( )No  generalized pain all over     DYSPNEA: ( ) Yes  ( ) No  Level:    PAST MEDICAL & SURGICAL HISTORY:  Diabetes mellitus type II, controlled  Atrial fibrillation  Congestive heart failure  Dyspnea  Morbid obesity with BMI of 40.0-44.9, adult  Neuropathy  Peripheral venous insufficiency  Thyroid nodule  HTN (hypertension)  Cellulitis  At risk for sleep apnea  History of esophagogastroduodenoscopy (EGD)  H/O colonoscopy  Other complications of gastric band procedure  S/P left knee arthroscopy  Status post medial meniscus repair  History of cholecystectomy  S/P cataract surgery    SOCIAL HX: lives at home     Hx opiate tolerance ( )YES  ( )NO    Baseline ADLs  (Prior to Admission)  ( ) Independent   ( )Dependent    FAMILY HISTORY:  Family history of breast cancer in mother    Family history of diabetes mellitus in mother    FHx: heart disease (Sibling)    Review of Systems:    Physical Discomfort- generalized pain but unable to further describe   Fatigue- yes  Weakness- yes    All other systems reviewed and negative    PHYSICAL EXAM:    Vital Signs Last 24 Hrs  T(C): 36.3 (2025 08:51), Max: 37.2 (2025 17:35)  T(F): 97.4 (2025 08:51), Max: 98.9 (2025 17:35)  HR: 55 (2025 08:51) (55 - 90)  BP: 124/45 (2025 08:51) (101/37 - 150/87)  BP(mean): 99 (2025 11:28) (99 - 99)  RR: 18 (2025 08:51) (18 - 24)  SpO2: 94% (2025 08:51) (94% - 99%)    Parameters below as of 2025 08:51  Patient On (Oxygen Delivery Method): nasal cannula  O2 Flow (L/min): 2     Daily Weight in k.3 (2025 06:23)    PPSV2: 60  %    General: pleasant female in bed, NAD  Mental Status: alert and oriented   HEENT: nasal cannula in place   Lungs: diminished breath sounds b/l   Cardiac: s1s2 +   GI: nontender, nondistended, +BS   : +voiding   Ext: edema +, discoloration +CAPPS   Neuro: weakness, non focal     LABS:                        7.2    14.07 )-----------( 242      ( 2025 07:04 )             24.2         135  |  104  |  19  ----------------------------<  95  3.2[L]   |  26  |  0.82    Ca    8.2[L]      2025 07:04  Phos  4.3       Mg     1.5         TPro  6.0  /  Alb  2.2[L]  /  TBili  0.6  /  DBili  x   /  AST  20  /  ALT  9[L]  /  AlkPhos  54      PT/INR - ( 2025 11:22 )   PT: 25.8 sec;   INR: 2.26 ratio    PTT - ( 2025 11:22 )  PTT:33.6 sec    Albumin: Albumin: 2.2 g/dL ( @ 07:04)    Allergies    pregabalin (Unknown)  latex (Unknown)  penicillin (Hives; Urticaria)  PC Pen VK (Rash)    Intolerances      MEDICATIONS  (STANDING):  aMIOdarone    Tablet 200 milliGRAM(s) Oral daily  atorvastatin 10 milliGRAM(s) Oral at bedtime  caspofungin IVPB      caspofungin IVPB 50 milliGRAM(s) IV Intermittent every 24 hours  furosemide    Tablet 40 milliGRAM(s) Oral daily  heparin   Injectable 5000 Unit(s) SubCutaneous every 8 hours  influenza  Vaccine (HIGH DOSE) 0.5 milliLiter(s) IntraMuscular once  magnesium sulfate  IVPB 1 Gram(s) IV Intermittent once  meropenem Injectable 1000 milliGRAM(s) IV Push every 8 hours  montelukast 10 milliGRAM(s) Oral at bedtime  multivitamin/minerals 1 Tablet(s) Oral daily  naloxone Injectable 0.4 milliGRAM(s) IV Push once  pantoprazole  Injectable 40 milliGRAM(s) IV Push every 12 hours  polyethylene glycol 3350 17 Gram(s) Oral daily  potassium chloride    Tablet ER 40 milliEquivalent(s) Oral every 4 hours  senna 2 Tablet(s) Oral at bedtime  thiamine 100 milliGRAM(s) Oral daily  vancomycin  IVPB 1250 milliGRAM(s) IV Intermittent every 12 hours    MEDICATIONS  (PRN):  acetaminophen     Tablet .. 650 milliGRAM(s) Oral every 6 hours PRN Temp greater or equal to 38C (100.4F)  aluminum hydroxide/magnesium hydroxide/simethicone Suspension 30 milliLiter(s) Oral every 4 hours PRN Dyspepsia  bisacodyl 5 milliGRAM(s) Oral daily PRN Constipation  melatonin 3 milliGRAM(s) Oral at bedtime PRN Insomnia  ondansetron Injectable 4 milliGRAM(s) IV Push every 6 hours PRN Nausea and/or Vomiting  oxyCODONE    IR 5 milliGRAM(s) Oral every 6 hours PRN Moderate Pain (4 - 6)  oxyCODONE    IR 10 milliGRAM(s) Oral every 6 hours PRN Severe Pain (7 - 10)  zolpidem 5 milliGRAM(s) Oral at bedtime PRN Insomnia      RADIOLOGY/ADDITIONAL STUDIES:    ACC: [id number] EXAM:  CT ANGIO LWR EXT (W)AW IC BI   ORDERED BY: REJI DAVIES   ACC: [id number] EXAM:  CT ANGIO ABD PELV (W)AW IC   ORDERED BY: REJI DAVIES   PROCEDURE DATE:  2025    INTERPRETATION:  CLINICAL INFORMATION: Sepsis. Decreased hemoglobin.   Right superficial femoral artery endarterectomy 2024, right   groin excisional debridement.  COMPARISON: Contrast-enhanced CT of the abdomen and pelvis 2024.  CONTRAST/COMPLICATIONS:  IV Contrast: Omnipaque 350 (accession [id number]), IV contrast documented   in unlinked concurrent exam (accession [id number])  125 cc administered   0   cc discarded  Oral Contrast: NONE  CT ANGIOGRAM ABDOMEN, PELVIS, AND LOWER EXTREMITIES:  PROCEDURE:  Initially, nonenhanced CT was obtained. Then, following the rapid   administration of intravenous contrast, CT angiography was performed   through the abdomen, pelvis, and lower extremities down to the toes.    Delayed images were also obtained. Sagittal and coronal reformats as well   as 3D reconstructions were performed.    FINDINGS:  LOWER CHEST: Status post TAVR. Mitral annulus calcification. Decreased   attenuation of the vascular blood pool, compatible with anemia. The lung   bases are clear.    LIVER: Again is noted a lobular cyst in the right lobe of the liver   laterally. Otherwise, within normal limits.  BILE DUCTS: Normal caliber.  GALLBLADDER: Removed.  SPLEEN: Within normal limits.  PANCREAS: Within normal limits.  ADRENALS: Within normal limits.  KIDNEYS/URETERS: Small bilateral renal cysts. Otherwise, within normal   limits.    BLADDER: Minimally distended.  REPRODUCTIVE ORGANS: Uterus and adnexa within normal limits.    BOWEL: Sigmoid diverticulosis. No bowel obstruction. Appendix normal.  PERITONEUM: No ascites. There is no evidence of hyperattenuating   collection within the peritoneal cavity or retroperitoneum to suggest   acute hemorrhage.  VESSELS: Extensive atherosclerotic disease of the nonaneurysmal abdominal   aorta. Status post endarterectomy of the right proximal focal femoral   artery.    On the right, there is moderate stenosis of the proximal common iliac   artery, severe stenosis of the common femoral artery, and diffuse   multifocal moderate to severe stenosis of the superficial femoral artery,   popliteal artery, trifurcation, and calf arteries. There is diminutive   two-vessel runoff to the foot with an occluded posterior tibial artery.    On the left, there is moderate stenosis of the proximal common iliac   artery, common femoral artery, and diffuse multifocal moderate stenosis   of the superficial femoral artery, popliteal artery, trifurcation, and   calf arteries. There is diminutive two-vessel runoff to the foot with an   occluded posterior tibial artery.    The IVC and pelvic veins opacify unremarkably.    RETROPERITONEUM/LYMPH NODES: No lymphadenopathy.  ABDOMINAL WALL: Status post I&D of the right groin with a residual   tracking peripherally enhancing 5.3 x 2.4 cm collection in the  subcutaneous tissues. Fat-containing umbilical hernia.  BONES: There is moderate soft tissue swelling of the dorsum of the left   foot. No subcutaneous gas is identified. Degenerative disc disease and   spondylosis of the lumbar spine.    IMPRESSION: No evidence of acute hemorrhage in the abdomen, pelvis, and   bilateral lower extremities.. Status post I&D of right groin collection.      ACC: [id number] EXAM:  XR CHEST PORTABLE IMMED 1V   ORDERED BY: REJI DAVIES   PROCEDURE DATE:  2025    INTERPRETATION:  AP chest on 2025 at 12:28 PM. Patient has sepsis.  Heart magnified by technique and [place] aortic valve   microcalcifications again noted.  On 2024 there is a slight infiltrate at right base medially.  Presently lungs are grossly clear.    IMPRESSION: Grossly clear lungs at this time.

## 2025-01-24 NOTE — PROGRESS NOTE ADULT - SUBJECTIVE AND OBJECTIVE BOX
Date of service: 01-24-25 @ 11:56    Lying in bed in NAD  Events noted   s/p PRBC transfusion    ROS: no fever or chills; denies dizziness, no HA, no SOB or cough, no abdominal pain, no diarrhea or constipation; no dysuria, no legs pain, no rashes    MEDICATIONS  (STANDING):  aMIOdarone    Tablet 200 milliGRAM(s) Oral daily  atorvastatin 10 milliGRAM(s) Oral at bedtime  caspofungin IVPB      caspofungin IVPB 50 milliGRAM(s) IV Intermittent every 24 hours  furosemide    Tablet 40 milliGRAM(s) Oral daily  heparin  Infusion.  Unit(s)/Hr (20 mL/Hr) IV Continuous <Continuous>  influenza  Vaccine (HIGH DOSE) 0.5 milliLiter(s) IntraMuscular once  magnesium sulfate  IVPB 1 Gram(s) IV Intermittent once  meropenem Injectable 1000 milliGRAM(s) IV Push every 8 hours  montelukast 10 milliGRAM(s) Oral at bedtime  multivitamin/minerals 1 Tablet(s) Oral daily  naloxone Injectable 0.4 milliGRAM(s) IV Push once  pantoprazole  Injectable 40 milliGRAM(s) IV Push every 12 hours  polyethylene glycol 3350 17 Gram(s) Oral daily  potassium chloride    Tablet ER 40 milliEquivalent(s) Oral every 4 hours  senna 2 Tablet(s) Oral at bedtime  thiamine 100 milliGRAM(s) Oral daily  vancomycin  IVPB 1250 milliGRAM(s) IV Intermittent every 12 hours    Vital Signs Last 24 Hrs  T(C): 36.5 (24 Jan 2025 11:40), Max: 37.2 (23 Jan 2025 17:35)  T(F): 97.7 (24 Jan 2025 11:40), Max: 98.9 (23 Jan 2025 17:35)  HR: 65 (24 Jan 2025 11:40) (55 - 78)  BP: 114/50 (24 Jan 2025 11:40) (101/37 - 124/45)  BP(mean): --  RR: 18 (24 Jan 2025 11:40) (18 - 18)  SpO2: 96% (24 Jan 2025 11:40) (94% - 99%)    Parameters below as of 24 Jan 2025 11:40  Patient On (Oxygen Delivery Method): room air     Physical exam:    Constitutional:  No acute distress  HEENT: NC/AT, EOMI, PERRLA, conjunctivae clear; ears and nose atraumatic; pharynx benign  Neck: supple; thyroid not palpable  Back: no tenderness  Respiratory: respiratory effort normal; clear to auscultation  Cardiovascular: S1S2 regular, no murmurs  Abdomen: soft, not tender, not distended, positive BS; no liver or spleen organomegaly  Genitourinary: no suprapubic tenderness  Lymphatic: no LN palpable  Musculoskeletal: no muscle tenderness, no joint swelling or tenderness  Extremities: no pedal edema  Right inguinal postsurgical wound with VAC  Neurological/ Psychiatric: AxOx3, judgement and insight normal; moving all extremities  Skin: no rashes; no palpable lesions    Labs: reviewed                        7.2    14.07 )-----------( 242      ( 24 Jan 2025 07:04 )             24.2     01-24    135  |  104  |  19  ----------------------------<  95  3.2[L]   |  26  |  0.82    Ca    8.2[L]      24 Jan 2025 07:04  Phos  4.3     01-23  Mg     1.5     01-24    TPro  6.0  /  Alb  2.2[L]  /  TBili  0.6  /  DBili  x   /  AST  20  /  ALT  9[L]  /  AlkPhos  54  01-24                        7.9    17.46 )-----------( 286      ( 23 Jan 2025 06:26 )             25.7     01-23    132[L]  |  105  |  25[H]  ----------------------------<  96  4.7   |  22  |  1.21    Ca    8.3[L]      23 Jan 2025 06:26  Phos  4.3     01-23  Mg     1.7     01-23    TPro  6.9  /  Alb  2.6[L]  /  TBili  1.0  /  DBili  x   /  AST  37  /  ALT  9[L]  /  AlkPhos  61  01-23     LIVER FUNCTIONS - ( 23 Jan 2025 06:26 )  Alb: 2.6 g/dL / Pro: 6.9 gm/dL / ALK PHOS: 61 U/L / ALT: 9 U/L / AST: 37 U/L / GGT: x           Urinalysis with Rflx Culture (01-22 @ 11:20)  Urine Appearance: Cloudy  Protein, Urine: Negative mg/dL  Urine Microscopic-Add On (NC) (01-22 @ 11:20)  White Blood Cell - Urine: 158 /HPF  Red Blood Cell - Urine: 1 /HPF    1/8 tissue c/s ENFA and CAGL    Urinalysis with Rflx Culture (collected 22 Jan 2025 11:20)    Culture - Blood (collected 22 Jan 2025 11:20)  Source: .Blood BLOOD  Gram Stain (23 Jan 2025 02:03):    Growth in aerobic bottle: Gram Negative Rods    Growth in anaerobic bottle: Gram Negative Rods  Final Report (24 Jan 2025 11:49):    Growth in aerobic and anaerobic bottles: Escherichia coli ESBL    Direct identification is available within approximately 3-5    hours either by Blood Panel Multiplexed PCR or Direct    MALDI-TOF. Details: [email or web address]  Organism: Blood Culture PCR  Escherichia coli ESBL (24 Jan 2025 11:49)  Organism: Escherichia coli ESBL (24 Jan 2025 11:49)      Method Type: TOYA      -  Ampicillin: R >16 These ampicillin results predict results for amoxicillin      -  Ampicillin/Sulbactam: R 16/8      -  Aztreonam: R >16      -  Cefazolin: R >16      -  Cefepime: R >16      -  Ceftriaxone: R >32      -  Ciprofloxacin: R >2      -  Ertapenem: S <=0.5      -  Gentamicin: S <=2      -  Imipenem: S <=1      -  Levofloxacin: R >4      -  Meropenem: S <=1      -  Piperacillin/Tazobactam: R <=8      -  Tobramycin: S <=2      -  Trimethoprim/Sulfamethoxazole: S <=0.5/9.5  Organism: Blood Culture PCR (24 Jan 2025 11:49)      Method Type: PCR      -  Escherichia coli: Detec      -  ESBL: Detec      -  CTX-M Resistance Marker: Detec    Culture - Urine (collected 22 Jan 2025 11:20)  Source: Catheterized None  Final Report (24 Jan 2025 11:54):    >100,000 CFU/ml Escherichia coli ESBL    >100,000 CFU/ml Escherichia coli ESBL #2    Multiple Morphological Strains  Organism: Escherichia coli ESBL  Escherichia coli ESBL (24 Jan 2025 11:54)  Organism: Escherichia coli ESBL (24 Jan 2025 11:54)      Method Type: TOYA      -  Ampicillin: R >16 These ampicillin results predict results for amoxicillin      -  Ampicillin/Sulbactam: I 16/8      -  Aztreonam: R >16      -  Cefazolin: R >16 For uncomplicated UTI with K. pneumoniae, E. coli, or P. mirablis: TOYA <=16 is sensitive and TOYA >=32 is resistant. This also predicts results for oral agents cefaclor, cefdinir, cefpodoxime, cefprozil, cefuroxime axetil, cephalexin and locarbef for uncomplicated UTI. Note that some isolates may be susceptible to these agents while testing resistant to cefazolin.      -  Cefepime: R >16      -  Ceftriaxone: R >32      -  Cefuroxime: R >16      -  Ciprofloxacin: R >2      -  Ertapenem: S <=0.5      -  Gentamicin: S <=2      -  Imipenem: S <=1      -  Levofloxacin: R >4      -  Meropenem: S <=1      -  Nitrofurantoin: S <=32 Should not be used to treat pyelonephritis      -  Piperacillin/Tazobactam: S <=8      -  Tobramycin: S <=2      -  Trimethoprim/Sulfamethoxazole: S <=0.5/9.5  Organism: Escherichia coli ESBL (24 Jan 2025 11:54)      Method Type: TOYA      -  Ampicillin: R >16 These ampicillin results predict results for amoxicillin      -  Ampicillin/Sulbactam: R >16/8      -  Aztreonam: R >16      -  Cefazolin: R >16 For uncomplicated UTI with K. pneumoniae, E. coli, or P. mirablis: TOYA <=16 is sensitive and TOYA >=32 is resistant. This also predicts results for oral agents cefaclor, cefdinir, cefpodoxime, cefprozil, cefuroxime axetil, cephalexin and locarbef for uncomplicated UTI. Note that some isolates may be susceptible to these agents while testing resistant to cefazolin.      -  Cefepime: R >16      -  Ceftriaxone: R >32      -  Cefuroxime: R >16      -  Ciprofloxacin: R >2      -  Ertapenem: S <=0.5      -  Gentamicin: S <=2      -  Imipenem: S <=1      -  Levofloxacin: R >4      -  Meropenem: S <=1      -  Nitrofurantoin: S <=32 Should not be used to treat pyelonephritis      -  Piperacillin/Tazobactam: S <=8      -  Tobramycin: S <=2      -  Trimethoprim/Sulfamethoxazole: S <=0.5/9.5    Culture - Blood (collected 22 Jan 2025 11:20)  Source: .Blood BLOOD  Gram Stain (23 Jan 2025 02:02):    Growth in anaerobic bottle: Gram Negative Rods    Growth in aerobic bottle: Gram Negative Rods  Final Report (24 Jan 2025 11:45):    Growth in aerobic and anaerobic bottles: Escherichia coli ESBL    See previous culture [id number]    Direct identification is available within approximately 3-5    hours either by Blood Panel Multiplexed PCR or Direct    MALDI-TOF. Details: [email or web address]    Radiology: all available radiological tests reviewed    Advanced directives addressed: full resuscitation Date of service: 01-24-25 @ 11:56    Lying in bed in NAD  Events noted   Noted with worsening Hb  s/p PRBC transfusion  Has increased weakness  No chills    ROS: no fever or chills; denies dizziness, no HA, no SOB or cough, no abdominal pain, no diarrhea or constipation; no dysuria, no legs pain, no rashes    MEDICATIONS  (STANDING):  aMIOdarone    Tablet 200 milliGRAM(s) Oral daily  atorvastatin 10 milliGRAM(s) Oral at bedtime  caspofungin IVPB      caspofungin IVPB 50 milliGRAM(s) IV Intermittent every 24 hours  furosemide    Tablet 40 milliGRAM(s) Oral daily  heparin  Infusion.  Unit(s)/Hr (20 mL/Hr) IV Continuous <Continuous>  influenza  Vaccine (HIGH DOSE) 0.5 milliLiter(s) IntraMuscular once  magnesium sulfate  IVPB 1 Gram(s) IV Intermittent once  meropenem Injectable 1000 milliGRAM(s) IV Push every 8 hours  montelukast 10 milliGRAM(s) Oral at bedtime  multivitamin/minerals 1 Tablet(s) Oral daily  naloxone Injectable 0.4 milliGRAM(s) IV Push once  pantoprazole  Injectable 40 milliGRAM(s) IV Push every 12 hours  polyethylene glycol 3350 17 Gram(s) Oral daily  potassium chloride    Tablet ER 40 milliEquivalent(s) Oral every 4 hours  senna 2 Tablet(s) Oral at bedtime  thiamine 100 milliGRAM(s) Oral daily  vancomycin  IVPB 1250 milliGRAM(s) IV Intermittent every 12 hours    Vital Signs Last 24 Hrs  T(C): 36.5 (24 Jan 2025 11:40), Max: 37.2 (23 Jan 2025 17:35)  T(F): 97.7 (24 Jan 2025 11:40), Max: 98.9 (23 Jan 2025 17:35)  HR: 65 (24 Jan 2025 11:40) (55 - 78)  BP: 114/50 (24 Jan 2025 11:40) (101/37 - 124/45)  BP(mean): --  RR: 18 (24 Jan 2025 11:40) (18 - 18)  SpO2: 96% (24 Jan 2025 11:40) (94% - 99%)    Parameters below as of 24 Jan 2025 11:40  Patient On (Oxygen Delivery Method): room air     Physical exam:    Constitutional:  No acute distress  HEENT: NC/AT, EOMI, PERRLA, conjunctivae clear; ears and nose atraumatic; pharynx benign  Neck: supple; thyroid not palpable  Back: no tenderness  Respiratory: respiratory effort normal; clear to auscultation  Cardiovascular: S1S2 regular, no murmurs  Abdomen: soft, not tender, not distended, positive BS; no liver or spleen organomegaly  Genitourinary: no suprapubic tenderness  Lymphatic: no LN palpable  Musculoskeletal: no muscle tenderness, no joint swelling or tenderness  Extremities: no pedal edema  Right inguinal postsurgical wound with VAC  Neurological/ Psychiatric: AxOx3, judgement and insight normal; moving all extremities  Skin: no rashes; no palpable lesions    Labs: reviewed                        7.2    14.07 )-----------( 242      ( 24 Jan 2025 07:04 )             24.2     01-24    135  |  104  |  19  ----------------------------<  95  3.2[L]   |  26  |  0.82    Ca    8.2[L]      24 Jan 2025 07:04  Phos  4.3     01-23  Mg     1.5     01-24    TPro  6.0  /  Alb  2.2[L]  /  TBili  0.6  /  DBili  x   /  AST  20  /  ALT  9[L]  /  AlkPhos  54  01-24                        7.9    17.46 )-----------( 286      ( 23 Jan 2025 06:26 )             25.7     01-23    132[L]  |  105  |  25[H]  ----------------------------<  96  4.7   |  22  |  1.21    Ca    8.3[L]      23 Jan 2025 06:26  Phos  4.3     01-23  Mg     1.7     01-23    TPro  6.9  /  Alb  2.6[L]  /  TBili  1.0  /  DBili  x   /  AST  37  /  ALT  9[L]  /  AlkPhos  61  01-23     LIVER FUNCTIONS - ( 23 Jan 2025 06:26 )  Alb: 2.6 g/dL / Pro: 6.9 gm/dL / ALK PHOS: 61 U/L / ALT: 9 U/L / AST: 37 U/L / GGT: x           Urinalysis with Rflx Culture (01-22 @ 11:20)  Urine Appearance: Cloudy  Protein, Urine: Negative mg/dL  Urine Microscopic-Add On (NC) (01-22 @ 11:20)  White Blood Cell - Urine: 158 /HPF  Red Blood Cell - Urine: 1 /HPF    1/8 tissue c/s ENFA and CAGL    Urinalysis with Rflx Culture (collected 22 Jan 2025 11:20)    Culture - Blood (collected 22 Jan 2025 11:20)  Source: .Blood BLOOD  Gram Stain (23 Jan 2025 02:03):    Growth in aerobic bottle: Gram Negative Rods    Growth in anaerobic bottle: Gram Negative Rods  Final Report (24 Jan 2025 11:49):    Growth in aerobic and anaerobic bottles: Escherichia coli ESBL    Direct identification is available within approximately 3-5    hours either by Blood Panel Multiplexed PCR or Direct    MALDI-TOF. Details: [email or web address]  Organism: Blood Culture PCR  Escherichia coli ESBL (24 Jan 2025 11:49)  Organism: Escherichia coli ESBL (24 Jan 2025 11:49)      Method Type: TOYA      -  Ampicillin: R >16 These ampicillin results predict results for amoxicillin      -  Ampicillin/Sulbactam: R 16/8      -  Aztreonam: R >16      -  Cefazolin: R >16      -  Cefepime: R >16      -  Ceftriaxone: R >32      -  Ciprofloxacin: R >2      -  Ertapenem: S <=0.5      -  Gentamicin: S <=2      -  Imipenem: S <=1      -  Levofloxacin: R >4      -  Meropenem: S <=1      -  Piperacillin/Tazobactam: R <=8      -  Tobramycin: S <=2      -  Trimethoprim/Sulfamethoxazole: S <=0.5/9.5  Organism: Blood Culture PCR (24 Jan 2025 11:49)      Method Type: PCR      -  Escherichia coli: Detec      -  ESBL: Detec      -  CTX-M Resistance Marker: Detec    Culture - Urine (collected 22 Jan 2025 11:20)  Source: Catheterized None  Final Report (24 Jan 2025 11:54):    >100,000 CFU/ml Escherichia coli ESBL    >100,000 CFU/ml Escherichia coli ESBL #2    Multiple Morphological Strains  Organism: Escherichia coli ESBL  Escherichia coli ESBL (24 Jan 2025 11:54)  Organism: Escherichia coli ESBL (24 Jan 2025 11:54)      Method Type: TOYA      -  Ampicillin: R >16 These ampicillin results predict results for amoxicillin      -  Ampicillin/Sulbactam: I 16/8      -  Aztreonam: R >16      -  Cefazolin: R >16 For uncomplicated UTI with K. pneumoniae, E. coli, or P. mirablis: TOYA <=16 is sensitive and TOYA >=32 is resistant. This also predicts results for oral agents cefaclor, cefdinir, cefpodoxime, cefprozil, cefuroxime axetil, cephalexin and locarbef for uncomplicated UTI. Note that some isolates may be susceptible to these agents while testing resistant to cefazolin.      -  Cefepime: R >16      -  Ceftriaxone: R >32      -  Cefuroxime: R >16      -  Ciprofloxacin: R >2      -  Ertapenem: S <=0.5      -  Gentamicin: S <=2      -  Imipenem: S <=1      -  Levofloxacin: R >4      -  Meropenem: S <=1      -  Nitrofurantoin: S <=32 Should not be used to treat pyelonephritis      -  Piperacillin/Tazobactam: S <=8      -  Tobramycin: S <=2      -  Trimethoprim/Sulfamethoxazole: S <=0.5/9.5  Organism: Escherichia coli ESBL (24 Jan 2025 11:54)      Method Type: TOYA      -  Ampicillin: R >16 These ampicillin results predict results for amoxicillin      -  Ampicillin/Sulbactam: R >16/8      -  Aztreonam: R >16      -  Cefazolin: R >16 For uncomplicated UTI with K. pneumoniae, E. coli, or P. mirablis: TOYA <=16 is sensitive and TOYA >=32 is resistant. This also predicts results for oral agents cefaclor, cefdinir, cefpodoxime, cefprozil, cefuroxime axetil, cephalexin and locarbef for uncomplicated UTI. Note that some isolates may be susceptible to these agents while testing resistant to cefazolin.      -  Cefepime: R >16      -  Ceftriaxone: R >32      -  Cefuroxime: R >16      -  Ciprofloxacin: R >2      -  Ertapenem: S <=0.5      -  Gentamicin: S <=2      -  Imipenem: S <=1      -  Levofloxacin: R >4      -  Meropenem: S <=1      -  Nitrofurantoin: S <=32 Should not be used to treat pyelonephritis      -  Piperacillin/Tazobactam: S <=8      -  Tobramycin: S <=2      -  Trimethoprim/Sulfamethoxazole: S <=0.5/9.5    Culture - Blood (collected 22 Jan 2025 11:20)  Source: .Blood BLOOD  Gram Stain (23 Jan 2025 02:02):    Growth in anaerobic bottle: Gram Negative Rods    Growth in aerobic bottle: Gram Negative Rods  Final Report (24 Jan 2025 11:45):    Growth in aerobic and anaerobic bottles: Escherichia coli ESBL    See previous culture [id number]    Direct identification is available within approximately 3-5    hours either by Blood Panel Multiplexed PCR or Direct    MALDI-TOF. Details: [email or web address]    Radiology: all available radiological tests reviewed    Advanced directives addressed: full resuscitation

## 2025-01-24 NOTE — PROGRESS NOTE ADULT - NS ATTEND AMEND GEN_ALL_CORE FT
Anemia is likely multifactorial, currently without any overt GI bleeding. Will benefit from heparin challenge if AC needs to be resumed. No plan for endoscopy at this time.

## 2025-01-24 NOTE — PROGRESS NOTE ADULT - ASSESSMENT
74-year-old Female with h/o DM type 2, TAVR 8/2024 on Plavix , A-fib on Xarelto, CHF, HLD, PVD, b/l LE venous insufficiency, bilateral CFA stenoses s/p Rt SFA endarterectomy on 12/17/24, recently hospitalized on 1/3 with right inguinal cellulitis/ postsurgical infection s/p right SFA endarterectomy with underlying inguinal collection treated with vancomycin 750 mg IV q12h, cefepime 2 gm IV q12h and metronidazole 500 mg IV q8h, then PO abx with PO cipro was admitted on 1/22 fever to Tmax of 101.9F, Soft BP in 90s/40-50s. Patient met sepsis criteria, cultures obtained and patient received vancomycin IV and ceftriaxone, then meropenem.    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 Possible UTI  #PVD  #Allergy to PCN  -cultures reviewed  -on meropenem 1 gm IV q8h and caspofungin 50 mg IV qd # 2  -tolerating abx well so far; no side effects noted  -recent surgical c/s noted   -monitor closely in ej of PCN allergy history  -surgical evaluation appreciated - she may need further washout  -local wound care  -cardiology evaluation appreciated - opinion that endocarditis is unlikley  -continue abx coverage   -f/u cultures  -monitor temps  -f/u CBC  -supportive care  2. Other issues:   -care per medicine    d/w medicine team    The patient may need prolonged IV antimicrobial therapy      74-year-old Female with h/o DM type 2, TAVR 8/2024 on Plavix , A-fib on Xarelto, CHF, HLD, PVD, b/l LE venous insufficiency, bilateral CFA stenoses s/p Rt SFA endarterectomy on 12/17/24, recently hospitalized on 1/3 with right inguinal cellulitis/ postsurgical infection s/p right SFA endarterectomy with underlying inguinal collection treated with vancomycin 750 mg IV q12h, cefepime 2 gm IV q12h and metronidazole 500 mg IV q8h, then PO abx with PO cipro was admitted on 1/22 fever to Tmax of 101.9F, Soft BP in 90s/40-50s. Patient met sepsis criteria, cultures obtained and patient received vancomycin IV and ceftriaxone, then meropenem.    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 Possible UTI  #PVD  #Allergy to PCN  -cultures reviewed  -on meropenem 1 gm IV q8h and caspofungin 50 mg IV qd # 2  -tolerating abx well so far; no side effects noted  -recent surgical c/s noted   -monitor closely in ej of PCN allergy history  -surgical evaluation appreciated - she may need further washout  -no need for vancomycin at present time  -local wound care  -cardiology evaluation appreciated - opinion that endocarditis is unlikely  -continue abx coverage   -f/u cultures  -monitor temps  -f/u CBC  -supportive care  2. Other issues:   -care per medicine    d/w medicine team    The patient may need prolonged IV antimicrobial therapy

## 2025-01-24 NOTE — PHYSICAL THERAPY INITIAL EVALUATION ADULT - PERTINENT HX OF CURRENT PROBLEM, REHAB EVAL
74-year-old female presenting to the ED on 1/22/25 with c/o with acute onset subjective fevers, chills, rigors. Pt was discharged from  on 1/18/25, pt reports feeling like her usual self up until day of admission. In ED patient febrile with Tmax of 101.9,BP in 90s/40-50s, HR in 70s. CBC noted for WBC 21, H/H 5.8/19. X-ray chest; negative.    Pt with hx of DM, TAVR 8/2024 on Plavix , A-fib on Xarelto, CHF, HLD, PVD, b/l LE venous insufficiency, bilateral CFA stenoses s/p Rt SFA endarterectomy on 12/17/24 complicated by postoperative seroma requiring irrigation and debridement and wound VAC placement Seen on 1/18/25 for dislodgment of wound VAC tubing presents for evaluation of fever/chills and rigors since last night. Pt D/C from  on 1/18 with a course of ciprofloxacin which she reports being compliant with. 74-year-old female presenting to the ED on 1/22/25 with c/o with acute onset subjective fevers, chills, rigors. Pt was discharged from  on 1/18/25, pt reports feeling like her usual self up until day of admission. In ED patient febrile with temp of 101.9,BP in 90s/40-50s, HR in 70s. CBC noted for WBC 21, H/H 5.8/19. X-ray chest; negative.    Pt with hx of DM, TAVR 8/2024 on Plavix , A-fib on Xarelto, CHF, HLD, PVD, b/l LE venous insufficiency, bilateral CFA stenoses s/p Rt SFA endarterectomy on 12/17/24 complicated by postoperative seroma requiring irrigation and debridement and wound VAC placement Seen on 1/18/25 for dislodgment of wound VAC tubing presents for evaluation of fever/chills and rigors since last night. Pt D/C from  on 1/18 with a course of ciprofloxacin which she reports being compliant with. 74-year-old female presenting to the ED on 1/22/25 with c/o with acute onset subjective fevers, chills, rigors. Pt was discharged from  on 1/18/25, pt reports feeling like her usual self up until day of admission. In ED patient febrile with temp of 101.9,BP in 90s/40-50s, HR in 70s. CBC noted for WBC 21, H/H 5.8/19. X-ray chest; negative.    Pt with hx of DM, TAVR 8/2024 on Plavix , A-fib on Xarelto, CHF, HLD, PVD, b/l LE venous insufficiency, b/l CFA stenoses s/p Rt SFA endarterectomy on 12/17/24 complicated by postoperative seroma requiring irrigation and debridement and wound VAC placement Seen on 1/18/25 for dislodgment of wound VAC tubing presents for evaluation of fever/chills and rigors since last night. Pt D/C from  on 1/18 with a course of ciprofloxacin which she reports being compliant with. 74-year-old female presenting to the ED on 1/22/25 with c/o with acute onset subjective fevers, chills, rigors. Pt was discharged from  on 1/18/25, pt reports feeling like her usual self up until day of admission. In ED patient febrile with temp of 101.9,BP in 90s/40-50s, HR in 70s. CBC noted for WBC 21, H/H 5.8/19. X-ray chest; negative.    Pt with hx of DM, TAVR 8/2024 on Plavix , A-fib on Xarelto, CHF, HLD, PVD, b/l LE venous insufficiency, b/l CFA stenoses s/p Rt SFA endarterectomy on 12/17/24 complicated by postoperative seroma requiring irrigation and debridement and wound VAC/pemrose drain placement. Seen on 1/18/25 for dislodgment of wound VAC tubing. Presents for evaluation of fever/chills and rigors since last night. Pt D/C from  on 1/18 with a course of ciprofloxacin which she reports being compliant with. See troponin results in results section. H/H- 7.2/24.2.

## 2025-01-24 NOTE — CONSULT NOTE ADULT - ASSESSMENT
75 yo F with PMHx as above presents c/o fevers and chills found to have sepsis 2/2 e. coli bacteremia. Echo interpretation stating cannot r/o endocarditis on MV.    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  -Patient is not immunocompromised so the likelihood that she has endocarditis from E. Coli Bacteremia is almost zero. The cases of E. Coli endocarditis are almost always seen in immunocompromised patients and even with that it is only seen in about 0.5% of cases of endocarditis.  -With that said, I did discuss a ASHLEY with patient. She was very overwhelmed about the though of a procedure, even worried about going for a PICC like. She wants to me to discuss with her sons. I called both her son Francesco and Jony, as per her request, but only got their voicemail.  -Patient is also a high risk for ASHLEY due to her morbid obesity, h/o lap band surgery and now anemia. Acute anemia that could be GI related is a relative contraindication to do a ASHLEY.  -Would consider treating her with IV antibiotics for bacteremia and monitoring once off antibiotics. If there is recurrence of bacteremia that would re-evaluate for ASHLEY.  -I will try to discuss with patient's family and addendum this note if there ar any changes.

## 2025-01-24 NOTE — PHYSICAL THERAPY INITIAL EVALUATION ADULT - LEVEL OF INDEPENDENCE: SUPINE/SIT, REHAB EVAL
unable to assess 2/2 fatigue unable to assess 2/2 fatigue. Pt's H/H- 7.2/24.2. Pt to receive 1 unit of blood. Will attempt to increase mobility next rx date.

## 2025-01-24 NOTE — PHYSICAL THERAPY INITIAL EVALUATION ADULT - PRECAUTIONS/LIMITATIONS, REHAB EVAL
CM, low h&h, 2L NC/cardiac precautions/fall precautions/oxygen therapy device and L/min CM, 2L NC/cardiac precautions/fall precautions/oxygen therapy device and L/min

## 2025-01-24 NOTE — PROGRESS NOTE ADULT - SUBJECTIVE AND OBJECTIVE BOX
Chief Complaint: Patient is a 74y old  Female who presents with a chief complaint of Anemia     (23 Jan 2025 11:12)      Interval events:   - No acute events overnight, VSS, pt afebrile   - Hgb 7.9 -> 7.2 pending additional 1U PRBC. Placed on heparin gtt to determine if can tolerate AC (xarelto/plavix remain held)   - Cardiology consulted, feel less likely presentation c/f endocarditis, holding off on ASHLEY at this time  - UCx with MDR ESBL E. Coli, multiple morphological strains reported  - Wound vac d/c'd per vascular, tentative plan for OR on Tuesday for R. ground wound wash out     ROS:   Patient denies any current chest pain, SOB, cough, f/c/n/v/d, abd pain, LE edema, myalgias, dysuria, HA, dizziness  All other review of systems is negative unless indicated above    Physical Exam:  Vital Signs Last 24 Hrs  T(C): 36.7 (24 Jan 2025 12:00), Max: 37.2 (23 Jan 2025 17:35)  T(F): 98.1 (24 Jan 2025 12:00), Max: 98.9 (23 Jan 2025 17:35)  HR: 58 (24 Jan 2025 12:00) (55 - 65)  BP: 113/59 (24 Jan 2025 12:00) (109/46 - 124/45)  BP(mean): --  RR: 18 (24 Jan 2025 12:00) (18 - 18)  SpO2: 99% (24 Jan 2025 12:00) (94% - 99%)    Parameters below as of 24 Jan 2025 12:00  Patient On (Oxygen Delivery Method): room air    Constitutional: NAD, awake and alert, obese, chronically ill appearing   HEENT: PERRLA, EOMI, MMM  Respiratory: Breath sounds are clear bilaterally, No wheezing, rales or rhonchi  Cardiovascular: S1 and S2, RRR, no murmurs, gallops or rubs  Gastrointestinal: +BS, soft, non-tender, non-distended, no CVA tenderness  Extremities: No peripheral edema, +DP pulses b/l  Neurological: A&O x 3, no focal deficits  Musculoskeletal: 5/5 strength b/l upper and lower extremities  Skin: Chronic LE venous stasis changes, +R groin wound foul smelling, skin warm and dry    Labs:  01-24-25 @ 07:04  Glucose 95 [70 - 99]  CO2 total 26 [22 - 31]  Chloride 104 [96 - 108]  Sodium 135 [135 - 145]  Potassium 3.2 [3.5 - 5.3]  Calcium 8.2 [8.5 - 10.1]  Creatinine 0.82 [0.50 - 1.30]  BUN 19 [7 - 23]  eGFR 75  Anion gap 5 [5 - 17]  AST 20 [15 - 37]  ALT 9 [12 - 78]  Alk phos 54 [40 - 120]  Albumin 2.2 [3.3 - 5.0]    WBC 14.07 [3.80 - 10.50]  Hemoglobin 7.2 [11.5 - 15.5]  Hematocrit 24.2 [34.5 - 45.0]  Platelets 242 [150 - 400]      01-23 @ 06:26  Glucose 96 mg/dL  HCO3 22 mmol/L  Chloride 105 mmol/L  Sodium 132 mmol/L>   Potassium 4.7 mmol/L  Creatinine 1.21 mg/dL  Calcium 8.3 mg/dL  BUN 25 mg/dL  eGFR 47 mL/min/1.73m2  Anion gap 5 mmol/L    WBC 17.46  Hemoglobin 7.9  Hemoatocrit 25.7  Platelet count 286    PT/INR - ( 22 Jan 2025 11:22 )   PT: 25.8 sec;   INR: 2.26 ratio    PTT - ( 22 Jan 2025 11:22 )  PTT:33.6 sec    Cardiac testing:  Troponin I, High Sensitivity Result: 893.19 ng/L (01-23-25 @ 16:44)  Troponin I, High Sensitivity Result: 397.07 ng/L (01-23-25 @ 10:36)  Troponin I, High Sensitivity Result: 280.79 ng/L (01-23-25 @ 06:26)  Troponin I, High Sensitivity Result: 187.08 ng/L (01-23-25 @ 00:25)      12 Lead ECG:   Ventricular Rate 72 BPM    Atrial Rate 72 BPM    P-R Interval 192 ms    QRS Duration 110 ms    Q-T Interval 452 ms    QTC Calculation(Bazett) 494 ms    P Axis 63 degrees    R Axis -41 degrees    T Axis 49 degrees    Diagnosis Line Normal sinus rhythm  Left axis deviation  Anterior infarct (cited on or before 22-JAN-2025)  Abnormal ECG  When compared with ECG of 03-JAN-2025 14:06,  Vent. rate has increased BY  26 BPM  ST now depressed in Inferior leads  Nonspecific T wave abnormality now evidentin Lateral leads  Confirmed by MD BRIANA, NEW ([id number]) on 1/22/2025 8:19:38 PM (01-22-25 @ 12:12)      Micro:  BCx 1/22 -- MDR ESBL E. coli   UCx 1/22 -- MDR ESBL E. Coli, multiple morphological strains reported    Radiology:  < from: Xray Chest 1 View-PORTABLE IMMEDIATE (01.22.25 @ 12:47) >  IMPRESSION: Grossly clear lungs at this time.    < end of copied text >  < from: CT Angio Lower Extremity w/ IV Cont, Bilateral (01.22.25 @ 13:45) >  IMPRESSION: No evidence of acute hemorrhage in the abdomen, pelvis, and   bilateral lower extremities.. Status post I&D of right groin collection.    < end of copied text >  < from: CT Angio Abdomen and Pelvis w/ IV Cont (01.22.25 @ 13:47) >  IMPRESSION: No evidence of acute hemorrhage in the abdomen, pelvis, and   bilateral lower extremities.. Status post I&D of right groin collection.    < end of copied text >    Medications:  MEDICATIONS  (STANDING):  aMIOdarone    Tablet 200 milliGRAM(s) Oral daily  atorvastatin 10 milliGRAM(s) Oral at bedtime  caspofungin IVPB      caspofungin IVPB 50 milliGRAM(s) IV Intermittent every 24 hours  furosemide    Tablet 40 milliGRAM(s) Oral daily  heparin  Infusion.  Unit(s)/Hr (18 mL/Hr) IV Continuous <Continuous>  influenza  Vaccine (HIGH DOSE) 0.5 milliLiter(s) IntraMuscular once  magnesium sulfate  IVPB 1 Gram(s) IV Intermittent once  meropenem Injectable 1000 milliGRAM(s) IV Push every 8 hours  montelukast 10 milliGRAM(s) Oral at bedtime  multivitamin/minerals 1 Tablet(s) Oral daily  naloxone Injectable 0.4 milliGRAM(s) IV Push once  pantoprazole  Injectable 40 milliGRAM(s) IV Push every 12 hours  polyethylene glycol 3350 17 Gram(s) Oral daily  potassium chloride    Tablet ER 40 milliEquivalent(s) Oral every 4 hours  senna 2 Tablet(s) Oral at bedtime  thiamine 100 milliGRAM(s) Oral daily    MEDICATIONS  (PRN):  acetaminophen     Tablet .. 650 milliGRAM(s) Oral every 6 hours PRN Temp greater or equal to 38C (100.4F)  aluminum hydroxide/magnesium hydroxide/simethicone Suspension 30 milliLiter(s) Oral every 4 hours PRN Dyspepsia  bisacodyl 5 milliGRAM(s) Oral daily PRN Constipation  heparin   Injectable 8500 Unit(s) IV Push every 6 hours PRN For aPTT less than 40  heparin   Injectable 4000 Unit(s) IV Push every 6 hours PRN For aPTT between 40 - 57  LORazepam     Tablet 0.5 milliGRAM(s) Oral two times a day PRN Anxiety  melatonin 3 milliGRAM(s) Oral at bedtime PRN Insomnia  ondansetron Injectable 4 milliGRAM(s) IV Push every 6 hours PRN Nausea and/or Vomiting  oxyCODONE    IR 5 milliGRAM(s) Oral every 6 hours PRN Moderate Pain (4 - 6)  oxyCODONE    IR 10 milliGRAM(s) Oral every 6 hours PRN Severe Pain (7 - 10)  zolpidem 5 milliGRAM(s) Oral at bedtime PRN Insomnia    Time based billing:   > 51 minutes of total time met or exceeded on this patient encounter. Time spent excludes time spent on separately reportable services/teaching during the encounter.

## 2025-01-24 NOTE — PROGRESS NOTE ADULT - SUBJECTIVE AND OBJECTIVE BOX
Patient is a 74 year old female who presents with a chief complaint of fever    Followup: anemia  At bedside, patient denies abdominal pain. Does feel "overwhelmed" but recent medical issues and hospitalizations. No bowel movement in 48 hours.     MEDICATIONS  (STANDING):  aMIOdarone    Tablet 200 milliGRAM(s) Oral daily  atorvastatin 10 milliGRAM(s) Oral at bedtime  caspofungin IVPB      caspofungin IVPB 50 milliGRAM(s) IV Intermittent every 24 hours  furosemide    Tablet 40 milliGRAM(s) Oral daily  heparin  Infusion.  Unit(s)/Hr (20 mL/Hr) IV Continuous <Continuous>  influenza  Vaccine (HIGH DOSE) 0.5 milliLiter(s) IntraMuscular once  magnesium sulfate  IVPB 1 Gram(s) IV Intermittent once  meropenem Injectable 1000 milliGRAM(s) IV Push every 8 hours  montelukast 10 milliGRAM(s) Oral at bedtime  multivitamin/minerals 1 Tablet(s) Oral daily  naloxone Injectable 0.4 milliGRAM(s) IV Push once  pantoprazole  Injectable 40 milliGRAM(s) IV Push every 12 hours  polyethylene glycol 3350 17 Gram(s) Oral daily  potassium chloride    Tablet ER 40 milliEquivalent(s) Oral every 4 hours  senna 2 Tablet(s) Oral at bedtime  thiamine 100 milliGRAM(s) Oral daily    MEDICATIONS  (PRN):  acetaminophen     Tablet .. 650 milliGRAM(s) Oral every 6 hours PRN Temp greater or equal to 38C (100.4F)  aluminum hydroxide/magnesium hydroxide/simethicone Suspension 30 milliLiter(s) Oral every 4 hours PRN Dyspepsia  bisacodyl 5 milliGRAM(s) Oral daily PRN Constipation  heparin   Injectable 9000 Unit(s) IV Push every 6 hours PRN For aPTT less than 40  heparin   Injectable 4500 Unit(s) IV Push every 6 hours PRN For aPTT between 40 - 57  melatonin 3 milliGRAM(s) Oral at bedtime PRN Insomnia  ondansetron Injectable 4 milliGRAM(s) IV Push every 6 hours PRN Nausea and/or Vomiting  oxyCODONE    IR 5 milliGRAM(s) Oral every 6 hours PRN Moderate Pain (4 - 6)  oxyCODONE    IR 10 milliGRAM(s) Oral every 6 hours PRN Severe Pain (7 - 10)  zolpidem 5 milliGRAM(s) Oral at bedtime PRN Insomnia      Vital Signs Last 24 Hrs  T(C): 36.7 (24 Jan 2025 12:00), Max: 37.2 (23 Jan 2025 17:35)  T(F): 98.1 (24 Jan 2025 12:00), Max: 98.9 (23 Jan 2025 17:35)  HR: 58 (24 Jan 2025 12:00) (55 - 65)  BP: 113/59 (24 Jan 2025 12:00) (109/46 - 124/45)  BP(mean): --  RR: 18 (24 Jan 2025 12:00) (18 - 18)  SpO2: 99% (24 Jan 2025 12:00) (94% - 99%)    Parameters below as of 24 Jan 2025 12:00  Patient On (Oxygen Delivery Method): room air        PHYSICAL EXAM:    Constitutional: No acute distress, well-developed, non-toxic appearing  HEENT: masked, good phonation, not icteric  Neck: supple, no lymphadenopathy  Respiratory: clear to ascultation bilaterally, no wheezing  Cardiovascular: S1 and S2, regular rate and rhythm, no murmurs rubs or gallops  Gastrointestinal: soft, non-tender, obsese, +bowel sounds, no rebound or guarding, no surgical scars, no drains  Extremities: No peripheral edema, no cyanosis or clubbing  Vascular: 2+ peripheral pulses, no venous stasis  Neurological: A/O x 3, no focal deficits, no asterixis  Psychiatric: Normal mood, normal affect  Skin: No rashes, not jaundiced    LABS:                        7.2    14.07 )-----------( 242      ( 24 Jan 2025 07:04 )             24.2     01-24    135  |  104  |  19  ----------------------------<  95  3.2[L]   |  26  |  0.82    Ca    8.2[L]      24 Jan 2025 07:04  Phos  4.3     01-23  Mg     1.5     01-24    TPro  6.0  /  Alb  2.2[L]  /  TBili  0.6  /  DBili  x   /  AST  20  /  ALT  9[L]  /  AlkPhos  54  01-24    PT/INR - ( 24 Jan 2025 11:24 )   PT: 15.3 sec;   INR: 1.33 ratio         PTT - ( 24 Jan 2025 11:24 )  PTT:30.8 sec  LIVER FUNCTIONS - ( 24 Jan 2025 07:04 )  Alb: 2.2 g/dL / Pro: 6.0 gm/dL / ALK PHOS: 54 U/L / ALT: 9 U/L / AST: 20 U/L / GGT: x             RADIOLOGY & ADDITIONAL STUDIES:

## 2025-01-24 NOTE — PROGRESS NOTE ADULT - ASSESSMENT
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   -Will continue wet to dry dressings daily  -Possible OR Monday for R groin wound wash out 74-year-old F, with PMHx of DM, TAVR 8/2024 on Plavix , A-fib on Xarelto, CHF, HLD, PVD, b/l LE venous insufficiency, bilateral CFA stenoses s/p Rt SFA endarterectomy on 12/17/24 presenting with R groin surgical site infection.   -Will continue wet to dry dressings daily  -Possible OR Tuesday for R groin wound wash out

## 2025-01-24 NOTE — PROGRESS NOTE ADULT - ASSESSMENT
74 year old female with history AF on Xarelto, multiple vascular surgeries on Plavix with most recent right femoral arterial endart 12/17/24 complicated by infection requiring wound vac and subsequent I&D's admitted with sepsis 2/2 wound infection @ right groin with 5.3x2.4cm collection in tissue and significant anemia, Hgb 5.8 on arrival now 7.9 with stable hemodynamics, no overt evidence of GIB and FOBT+ in ED.    Imp: Multifactorial anemia due to nonhealing wound requiring I&D with collection, sepsis, with low suspicion for GIB as no overt bleeding with Hgb 5.8.    Rec:  ::Continue trend HH and transfuse to maintain Hgb >7.0  ::No urgent endoscopic evaluation recommended  ::Should patient require AC for prevention thrombosis would opt for UFH and surveillance  ::PPI IV BID ok  ::Bowel regimen    Discussed above with FREIDA GORDON. Resume AC with UFH and continue surveillance for GI bleeding. If acute changes in hemodynamics or yulia bleeding would suggest CT Angio.

## 2025-01-24 NOTE — CONSULT NOTE ADULT - CONSULT REASON
anemia
Severe sepsis
Evaluation for endocarditis/ASHLEY
Evaluation of right groin wound
GOC
bacteremia

## 2025-01-24 NOTE — PROGRESS NOTE ADULT - SUBJECTIVE AND OBJECTIVE BOX
Pt seen at bedside. Very overwhelmed with her medical management, teary eyed.  Complaining of some pain in bilateral legs.  No c/o pain at right groin site.    Vital Signs Last 24 Hrs  T(C): 36.3 (24 Jan 2025 08:51), Max: 37.2 (23 Jan 2025 17:35)  T(F): 97.4 (24 Jan 2025 08:51), Max: 98.9 (23 Jan 2025 17:35)  HR: 55 (24 Jan 2025 08:51) (55 - 90)  BP: 124/45 (24 Jan 2025 08:51) (101/37 - 150/87)  BP(mean): 99 (23 Jan 2025 11:28) (99 - 99)  RR: 18 (24 Jan 2025 08:51) (18 - 24)  SpO2: 94% (24 Jan 2025 08:51) (94% - 99%)    Parameters below as of 24 Jan 2025 08:51  Patient On (Oxygen Delivery Method): nasal cannula  O2 Flow (L/min): 2      Exam:  R groin wound vac removed yesterday and wet to dry dressing placed  R groin wound approx 6-7 cm, clean  Wound cleaned with betadine, Wet to dry dressing applied, Patient tolerated well

## 2025-01-24 NOTE — CONSULT NOTE ADULT - ASSESSMENT
Pt is a 74y old Female with hx of DM, TAVR 8/2024 on Plavix , A-fib on Xarelto, CHF, HLD, PVD, b/l LE venous insufficiency, bilateral CFA stenoses s/p Rt SFA endarterectomy on 12/17/24 complicated by postoperative seroma requiring irrigation and debridement and wound VAC placement Seen on January 18 for dislodgment of wound VAC tubing presents for evaluation of fever/chills and rigors since last night. Patient reports she had felt like her usual self since her last discharge until today. She had acute onset subjective fevers, chills, rigors d/t which she came to ED. In ED patient febrile with Tmax of 101.9, Soft BP in 90s/40-50s, HR in 70s. CBC noted for WBC 21, H/H 5.8/19. Palliative medicine consulted to help establish GOC and advance care planing     1) Sepsis   - possible UTI   - right inguinal infection- wound ( s/p debridement on 1/8 with wound vac and replaced on 1/11)   - vascular notes appreciated   - + bacteremia   - c/w vanco/radha and caspofungin   - ID consult appreciated  - would consult appreciated    2) Anemia   - r/o GIB   - + occult blood   - GI consult appreciated   - monitor labs   - transfuse PRN      3) Heart failure and Afib   - c/w her Lasix  - hold her metoprolol and aldactone at this time   - Monitor weights, strict ins and outs  - cardiology consult appreciated     Process of Care  --Reviewed dx/treatment problems and alignment with Goals of Care    Physical Aspects of Care  --Pain  patient denies at this time  c/w oxycodone 5mg every 6 hours PRN for moderate pain   c/w oxycodone 10mg every 6 hours PRN for severe pain     --Dyspnea  No SOB at this time  comfortable and in NAD    --Nausea Vomiting  denies    --Weakness  PT as tolerated     Psychological and Psychiatric Aspects of Care:   --Greif/Bereavment: emotional support provided  -Pastoral Care Available PRN     Social Aspects of Care  -SW involved     Cultural Aspects  -Primary Language: English    Goals of Care:     We discussed Palliative Care team being a supportive team when a patient has ongoing illnesses.  We also discussed that it is not an end of life care service, but can help navigate symptoms and emotional support througout their hospital stay here.    Ethical and Legal Aspects: NA  Capacity- pt appears to have capacity   HCP/Surrogate:  Pinky alternate son Jony  Code Status- full code   MOLST-   Dispo Plan-    Discussed With: Dr. Cole     Case coordinated with attending and SW and RN     Time Spent: 60 minutes including the care, coordination and counseling of this patient, 50% of which was spent coordinating and counseling.

## 2025-01-24 NOTE — CONSULT NOTE ADULT - CONSULT REQUESTED DATE/TIME
22-Jan-2025 21:14
23-Jan-2025 15:30
23-Jan-2025 02:00
23-Jan-2025 09:28
24-Jan-2025 09:19
24-Jan-2025 08:27

## 2025-01-24 NOTE — PHYSICAL THERAPY INITIAL EVALUATION ADULT - GENERAL OBSERVATIONS, REHAB EVAL
Pt semi-supine in bed, HOB elevated, call bell in reach, bed alarm activated, pt reports feeling extremely fatigued, RLE wound, assessment performed in bed. Pt semi-supine in bed, HOB elevated, call bell in reach, bed alarm activated, pt reports feeling extremely fatigued, denies pain, RLE wound, assessment performed in bed.

## 2025-01-24 NOTE — CONSULT NOTE ADULT - SUBJECTIVE AND OBJECTIVE BOX
CHIEF COMPLAINT:  Patient is a 74y old  Female who presents with a chief complaint of fever/chills    HPI:  74-year-old female with past medical history of DM, TAVR 2024 on Plavix , A-fib on Xarelto, CHF, HLD, PVD, b/l LE venous insufficiency, bilateral CFA stenoses s/p Rt SFA endarterectomy on 24 complicated by postoperative seroma requiring irrigation and debridement and wound VAC placement Seen on  for dislodgment of wound VAC tubing presents for evaluation of fever/chills and rigors since last night. Patient reports she had felt like her usual self since her last discharge until today. She had acute onset subjective fevers, chills, rigors d/t which she came to ED. She was discharged from  on  with a course of ciprofloxacin which she reports being compliant with, reports having "5 pills" left in the course. She denies any N/V, cough, sore throat, rhinitis, chest pain, rash, diarrhea or dysuria.   In ED patient febrile with Tmax of 101.9, Soft BP in 90s/40-50s, HR in 70s. CBC noted for WBC 21, H/H 5.8/19, Plt 441. CMP noted for BUN/Cr 28/1.04. Lactate 2.5 > 3.7. F/u/COVID negative. UA cloudy, with trace ketones, mod leuks, pos nitrites, 158 wbcs. Guaiac positive. CTA of abdomen and b/l LE negative for acute hemorrhage. CXR read as "grossly clear lungs". Patient met sepsis criteria, cultures obtained and patient recieved broad spectrum antibiotics with vancomycin and ceftriaxone in ED. Patient also recieved 2L NS and ordered for 2 U pRBCs. Patient admitted to  for sepsis and symptomatic anemia.    25: No acute events O/N. She is very concerned about having any procedures, even the though of a PICC line and needing IV antibiotics worries her.        PMHx:  PAST MEDICAL & SURGICAL HISTORY:  Diabetes mellitus type II, controlled  Atrial fibrillation  Congestive heart failure  Dyspnea  Morbid obesity s/p lap band  Neuropathy  Peripheral venous insufficiency  Thyroid nodule  HTN (hypertension)  S/P left knee arthroscopy  Status post medial meniscus repair  History of cholecystectomy  S/P cataract surgery    FAMILY HISTORY:   FAMILY HISTORY:  Family history of breast cancer in mother  Family history of diabetes mellitus in mother  FHx: heart disease (Sibling)    ALLERGIES:  Allergies  pregabalin (Unknown)  latex (Unknown)  penicillin (Hives; Urticaria)  PC Pen VK (Rash)    REVIEW OF SYSTEMS:  10 system ROS was obtained, all pertinent positives and negatives are in HPI otherwise they were negative.    Vital Signs Last 24 Hrs  T(C): 37.2 (2025 17:35), Max: 37.2 (2025 17:35)  T(F): 98.9 (2025 17:35), Max: 98.9 (2025 17:35)  HR: 63 (2025 17:35) (63 - 90)  BP: 109/46 (2025 17:35) (101/37 - 150/87)  BP(mean): 99 (2025 11:28) (99 - 99)  RR: 18 (2025 17:35) (18 - 24)  SpO2: 97% (2025 17:35) (97% - 99%)    Parameters below as of 2025 17:35  Patient On (Oxygen Delivery Method): nasal cannula  O2 Flow (L/min): 2    I&O's Summary  2025 07:01  -  2025 07:00  --------------------------------------------------------  IN: 0 mL / OUT: 350 mL / NET: -350 mL    PHYSICAL EXAM:   Constitutional: awake and alert in NAD  HEENT: EOMI, Normal Hearing, MMM  Neck: Soft and supple, No LAD, No JVD, no carotid bruit  Respiratory: Breath sounds are clear bilaterally, No wheezing, rales or rhonchi, good air movement  Cardiovascular: RRR soft systolic murmur at apex  Gastrointestinal: Bowel Sounds present, soft, nontender, nondistended, no guarding, no rebound  Extremities: Warm and well perfused, no peripheral edema  Neurological: A/O x 3, no focal deficits appreciated  Skin: No rashes appreciated    MEDICATIONS:  MEDICATIONS  (STANDING):  aMIOdarone    Tablet 200 milliGRAM(s) Oral daily  atorvastatin 10 milliGRAM(s) Oral at bedtime  caspofungin IVPB      caspofungin IVPB 50 milliGRAM(s) IV Intermittent every 24 hours  furosemide    Tablet 40 milliGRAM(s) Oral daily  heparin   Injectable 5000 Unit(s) SubCutaneous every 8 hours  influenza  Vaccine (HIGH DOSE) 0.5 milliLiter(s) IntraMuscular once  meropenem Injectable 1000 milliGRAM(s) IV Push every 8 hours  montelukast 10 milliGRAM(s) Oral at bedtime  multivitamin/minerals 1 Tablet(s) Oral daily  naloxone Injectable 0.4 milliGRAM(s) IV Push once  pantoprazole  Injectable 40 milliGRAM(s) IV Push every 12 hours  polyethylene glycol 3350 17 Gram(s) Oral daily  senna 2 Tablet(s) Oral at bedtime  thiamine 100 milliGRAM(s) Oral daily  vancomycin  IVPB 1250 milliGRAM(s) IV Intermittent every 12 hours      LABS: All Labs Reviewed:                        7.2    14.07 )-----------( 242      ( 2025 07:04 )             24.2         135  |  104  |  19  ----------------------------<  95  3.2[L]   |  26  |  0.82    Ca    8.2[L]      2025 07:04  Phos  4.3       Mg     1.7         TPro  6.0  /  Alb  2.2[L]  /  TBili  0.6  /  DBili  x   /  AST  20  /  ALT  9[L]  /  AlkPhos  54      PT/INR - ( 2025 11:22 )   PT: 25.8 sec;   INR: 2.26 ratio    PTT - ( 2025 11:22 )  PTT:33.6 sec    BLOOD CULTURES: Organism Blood Culture PCR  Gram Stain Blood -- Gram Stain   Growth in aerobic bottle: Gram Negative Rods  Growth in anaerobic bottle: Gram Negative Rods  Specimen Source .Blood BLOOD  Culture-Blood --    RADIOLOGY:   Reviewed in EMR     EK/22/25, my interpretation: NSR LAD IRBBB PRWP    TELEMETRY:    Reviewed, no significant events appreciated    ECHO:    CONCLUSIONS:      1. Left ventricular cavity is moderately dilated. Left ventricular systolic function is normal with an ejection fraction of 58 % by Ramirez's method of disks with an ejection fraction visually estimated at 55 to 60 %.   2. Compared to the transthoracic echocardiogram performed on 2024, there have been no significant interval changes.    ________________________________________________________________________________________  FINDINGS:     Left Ventricle:  The left ventricular cavity is moderately dilated. Left ventricular wall thickness is normal. Left ventricular systolic function is normal with an ejection fraction visually estimated at 55 to 60%.     Left Atrium:  The left atrium is moderately dilated with an indexed volume of 47.11 ml/m².     Aortic Valve:  A a transcatheter deployed (TAVR) is present in the aortic position.     Mitral Valve:  There is mitral valve thickening of the anterior and posterior leaflets. There is moderate calcification of the mitral valve annulus. Cannot rule out endocarditis due to focal MAC that appears mobile in some views. There is mild mitral regurgitation.     Tricuspid Valve:  Structurally normal tricuspid valve with normal leaflet excursion. Thereis mild to moderate tricuspid regurgitation. Estimated pulmonary artery systolic pressure is 54 mmHg, consistent with moderate pulmonary hypertension.     Pulmonic Valve:  Structurally normal pulmonic valve with normal leaflet excursion.     SystemicVeins:  The inferior vena cava is normal in size measuring 1.63 cm in diameter, (normal <2.1cm) with normal inspiratory collapse (normal >50%) consistent with normal right atrial pressure (~3, range 0-5mmHg).    NOTE: My interpretation is that her MAC appears moderate and stable without characteristics of mobility

## 2025-01-25 LAB
ANION GAP SERPL CALC-SCNC: 4 MMOL/L — LOW (ref 5–17)
APTT BLD: 71.9 SEC — HIGH (ref 24.5–35.6)
APTT BLD: 82.4 SEC — HIGH (ref 24.5–35.6)
BUN SERPL-MCNC: 16 MG/DL — SIGNIFICANT CHANGE UP (ref 7–23)
CALCIUM SERPL-MCNC: 8.1 MG/DL — LOW (ref 8.5–10.1)
CHLORIDE SERPL-SCNC: 104 MMOL/L — SIGNIFICANT CHANGE UP (ref 96–108)
CO2 SERPL-SCNC: 26 MMOL/L — SIGNIFICANT CHANGE UP (ref 22–31)
CREAT SERPL-MCNC: 0.7 MG/DL — SIGNIFICANT CHANGE UP (ref 0.5–1.3)
EGFR: 90 ML/MIN/1.73M2 — SIGNIFICANT CHANGE UP
GLUCOSE SERPL-MCNC: 110 MG/DL — HIGH (ref 70–99)
HCT VFR BLD CALC: 27.6 % — LOW (ref 34.5–45)
HGB BLD-MCNC: 8.6 G/DL — LOW (ref 11.5–15.5)
INR BLD: 1.23 RATIO — HIGH (ref 0.85–1.16)
MAGNESIUM SERPL-MCNC: 1.7 MG/DL — SIGNIFICANT CHANGE UP (ref 1.6–2.6)
MCHC RBC-ENTMCNC: 27.2 PG — SIGNIFICANT CHANGE UP (ref 27–34)
MCHC RBC-ENTMCNC: 31.2 G/DL — LOW (ref 32–36)
MCV RBC AUTO: 87.3 FL — SIGNIFICANT CHANGE UP (ref 80–100)
PLATELET # BLD AUTO: 282 K/UL — SIGNIFICANT CHANGE UP (ref 150–400)
POTASSIUM SERPL-MCNC: 3.8 MMOL/L — SIGNIFICANT CHANGE UP (ref 3.5–5.3)
POTASSIUM SERPL-SCNC: 3.8 MMOL/L — SIGNIFICANT CHANGE UP (ref 3.5–5.3)
PROTHROM AB SERPL-ACNC: 14.5 SEC — HIGH (ref 9.9–13.4)
RBC # BLD: 3.16 M/UL — LOW (ref 3.8–5.2)
RBC # FLD: 16.8 % — HIGH (ref 10.3–14.5)
SODIUM SERPL-SCNC: 134 MMOL/L — LOW (ref 135–145)
WBC # BLD: 12.66 K/UL — HIGH (ref 3.8–10.5)
WBC # FLD AUTO: 12.66 K/UL — HIGH (ref 3.8–10.5)

## 2025-01-25 PROCEDURE — 99233 SBSQ HOSP IP/OBS HIGH 50: CPT

## 2025-01-25 PROCEDURE — 99232 SBSQ HOSP IP/OBS MODERATE 35: CPT

## 2025-01-25 RX ADMIN — Medication 100 MILLIGRAM(S): at 09:44

## 2025-01-25 RX ADMIN — Medication 2000 UNIT(S)/HR: at 19:26

## 2025-01-25 RX ADMIN — Medication 2000 UNIT(S)/HR: at 03:54

## 2025-01-25 RX ADMIN — Medication 2000 UNIT(S)/HR: at 04:39

## 2025-01-25 RX ADMIN — MEROPENEM 1000 MILLIGRAM(S): 500 INJECTION INTRAVENOUS at 16:02

## 2025-01-25 RX ADMIN — Medication 1 TABLET(S): at 09:43

## 2025-01-25 RX ADMIN — OXYCODONE HYDROCHLORIDE 10 MILLIGRAM(S): 30 TABLET ORAL at 18:21

## 2025-01-25 RX ADMIN — Medication 2000 UNIT(S)/HR: at 03:44

## 2025-01-25 RX ADMIN — PANTOPRAZOLE 40 MILLIGRAM(S): 20 TABLET, DELAYED RELEASE ORAL at 06:13

## 2025-01-25 RX ADMIN — POLYETHYLENE GLYCOL 3350 17 GRAM(S): 17 POWDER, FOR SOLUTION ORAL at 09:44

## 2025-01-25 RX ADMIN — AMIODARONE HYDROCHLORIDE 200 MILLIGRAM(S): 50 INJECTION, SOLUTION INTRAVENOUS at 09:43

## 2025-01-25 RX ADMIN — Medication 2000 UNIT(S)/HR: at 07:24

## 2025-01-25 RX ADMIN — OXYCODONE HYDROCHLORIDE 10 MILLIGRAM(S): 30 TABLET ORAL at 12:19

## 2025-01-25 RX ADMIN — ATORVASTATIN CALCIUM 10 MILLIGRAM(S): 80 TABLET, FILM COATED ORAL at 21:38

## 2025-01-25 RX ADMIN — Medication 2 TABLET(S): at 21:38

## 2025-01-25 RX ADMIN — MONTELUKAST SODIUM 10 MILLIGRAM(S): 5 TABLET, CHEWABLE ORAL at 21:38

## 2025-01-25 RX ADMIN — PANTOPRAZOLE 40 MILLIGRAM(S): 20 TABLET, DELAYED RELEASE ORAL at 18:11

## 2025-01-25 RX ADMIN — MEROPENEM 1000 MILLIGRAM(S): 500 INJECTION INTRAVENOUS at 21:38

## 2025-01-25 RX ADMIN — CASPOFUNGIN ACETATE 260 MILLIGRAM(S): 5 INJECTION, POWDER, LYOPHILIZED, FOR SOLUTION INTRAVENOUS at 09:44

## 2025-01-25 RX ADMIN — Medication 2000 UNIT(S)/HR: at 11:29

## 2025-01-25 RX ADMIN — Medication 40 MILLIGRAM(S): at 09:44

## 2025-01-25 NOTE — PROGRESS NOTE ADULT - ASSESSMENT
74 year old female with history AF on Xarelto, multiple vascular surgeries on Plavix with most recent right femoral arterial endart 12/17/24 complicated by infection requiring wound vac and subsequent I&D's admitted with sepsis 2/2 wound infection @ right groin with 5.3x2.4cm collection in tissue and significant anemia, Hgb 5.8 on arrival now 7.9 with stable hemodynamics, no overt evidence of GIB and FOBT+ in ED.    Imp: Multifactorial anemia due to nonhealing wound requiring I&D with collection, sepsis, with low suspicion for GIB as no overt bleeding with Hgb 5.8. Hb now stable in 8's despite heparin drip.     Rec:  ::Continue trend HH and transfuse to maintain Hgb >7.0  ::No need for endoscopy at this time given Hb stable on anticoagulation  ::Continue PPI IV BID for now  ::If evidence of brisk overt GI bleeding, would recommend CTA

## 2025-01-25 NOTE — PROGRESS NOTE ADULT - ASSESSMENT
74-year-old Female with h/o DM type 2, TAVR 8/2024 on Plavix , A-fib on Xarelto, CHF, HLD, PVD, b/l LE venous insufficiency, bilateral CFA stenoses s/p Rt SFA endarterectomy on 12/17/24, recently hospitalized on 1/3 with right inguinal cellulitis/ postsurgical infection s/p right SFA endarterectomy with underlying inguinal collection treated with vancomycin 750 mg IV q12h, cefepime 2 gm IV q12h and metronidazole 500 mg IV q8h, then PO abx with PO cipro was admitted on 1/22 fever to Tmax of 101.9F, Soft BP in 90s/40-50s. Patient met sepsis criteria, cultures obtained and patient received vancomycin IV and ceftriaxone, then meropenem.    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 Possible UTI  #PVD  #Allergy to PCN  -cultures reviewed  -on meropenem 1 gm IV q8h and caspofungin 50 mg IV qd # 3  -tolerating abx well so far; no side effects noted  -recent surgical c/s noted   -monitor closely in ej of PCN allergy history  -surgical evaluation appreciated - she may need further washout  -no need for vancomycin at present time  -local wound care  -cardiology evaluation appreciated - opinion that endocarditis is unlikely  -continue abx coverage   -f/u cultures  -monitor temps  -f/u CBC  -supportive care  2. Other issues:   -care per medicine    d/w medicine team    The patient may need prolonged IV antimicrobial therapy      Pharmacokinetic Assessment Follow Up: IV Vancomycin    Vancomycin serum concentration assessment(s):    The random level was drawn correctly and can be used to guide therapy at this time. The measurement is within the desired definitive target range of 15 to 20 mcg/mL.    Vancomycin Regimen Plan:  750 mg x 1 today, 24h level  Re-dose when the random level is less than 20 mcg/mL, next level to be drawn at 1300 on 1/7    Scheduled Administration Times        Drug levels (last 3 results):  Recent Labs   Lab Result Units 01/06/25  0928   Vancomycin Random ug/ml 15.6       Vancomycin Administrations:  vancomycin given in the last 96 hours                     vancomycin 1,250 mg in D5W 250 mL IVPB (admixture device) (mg) 1,250 mg New Bag 01/06/25 0101                    Pharmacy will continue to follow and monitor vancomycin.    Please contact pharmacy at extension [phone] for questions regarding this assessment.    Thank you for the consult,   Juan Salcido       Patient brief summary:  Delio Daniel Jr. is a 67 y.o. male initiated on antimicrobial therapy with IV Vancomycin for treatment of sepsis    The patient's current regimen is pulse dosing    Drug Allergies:   Review of patient's allergies indicates:  No Known Allergies    Actual Body Weight:  Wt Readings from Last 1 Encounters:   01/06/25 65 kg (143 lb 4.8 oz)       Renal Function:   Estimated Creatinine Clearance: 31.4 mL/min (A) (based on SCr of 2.07 mg/dL (H)).,     Dialysis Method (if applicable):  N/A    CBC (last 72 hours):  Recent Labs   Lab Result Units 01/05/25  1300 01/05/25  2140 01/06/25  0928   WBC x10(3)/mcL 14.78* 17.77  17.77* 16.82*   Hgb g/dL 11.3* 11.0* 10.0*   Hct % 36.2* 35.1* 31.7*   Platelet x10(3)/mcL 266 271 254   Mono % %  --   --  5.2   Monocytes % %  --  6  --    Eos % %  --   --  0.0   Basophil % %  --   --  0.5       Metabolic Panel (last 72 hours):  Recent Labs   Lab Result Units 01/05/25  1300 01/05/25  2140 01/05/25  2204  01/06/25  0317 01/06/25  0618   Sodium mmol/L 145 146*  --  138  --    Sodium, Blood Gas mmol/L  --   --   --   --  139   Potassium mmol/L 3.7 3.7  --  3.2*  --    Potassium, Blood Gas mmol/L  --   --   --   --  3.2*   Chloride mmol/L 109* 107  --  101  --    CO2 mmol/L 23 23  --  21*  --    Glucose mg/dL 114 166*  --  403*  --    Glucose, UA   --   --  Normal  --   --    Blood Urea Nitrogen mg/dL 43.0* 48.3*  --  43.9*  --    Creatinine mg/dL 1.94* 2.48*  --  2.07*  --    Albumin g/dL 2.8* 2.8*  --  2.3*  --    Bilirubin Total mg/dL 1.5 1.4  --  1.3  --    ALP unit/L 97 100  --  91  --    AST unit/L 12 17  --  14  --    ALT unit/L 9 8  --  8  --        Microbiologic Results:  Microbiology Results (last 7 days)       Procedure Component Value Units Date/Time    Blood culture #1 **CANNOT BE ORDERED STAT** [[id number]] Collected: 01/05/25 2140    Order Status: Resulted Specimen: Blood from Hand, Right Updated: 01/05/25 2148    Blood culture #2 **CANNOT BE ORDERED STAT** [[id number]] Collected: 01/05/25 2140    Order Status: Resulted Specimen: Blood from Hand, Left Updated: 01/05/25 2148

## 2025-01-25 NOTE — PROGRESS NOTE ADULT - SUBJECTIVE AND OBJECTIVE BOX
Date of service: 01-25-25 @ 11:11    Lying in bed in NAD  Alert   Low grade fever  Has right inguinal tenderness  Urinary frequency    ROS: denies dizziness, no HA, no SOB or cough, no abdominal pain, no diarrhea or constipation; no legs pain, no rashes    MEDICATIONS  (STANDING):  aMIOdarone    Tablet 200 milliGRAM(s) Oral daily  atorvastatin 10 milliGRAM(s) Oral at bedtime  caspofungin IVPB      caspofungin IVPB 50 milliGRAM(s) IV Intermittent every 24 hours  furosemide    Tablet 40 milliGRAM(s) Oral daily  heparin  Infusion.  Unit(s)/Hr (18 mL/Hr) IV Continuous <Continuous>  influenza  Vaccine (HIGH DOSE) 0.5 milliLiter(s) IntraMuscular once  meropenem Injectable 1000 milliGRAM(s) IV Push every 8 hours  montelukast 10 milliGRAM(s) Oral at bedtime  multivitamin/minerals 1 Tablet(s) Oral daily  naloxone Injectable 0.4 milliGRAM(s) IV Push once  pantoprazole  Injectable 40 milliGRAM(s) IV Push every 12 hours  polyethylene glycol 3350 17 Gram(s) Oral daily  senna 2 Tablet(s) Oral at bedtime  thiamine 100 milliGRAM(s) Oral daily    Vital Signs Last 24 Hrs  T(C): 37.4 (25 Jan 2025 09:31), Max: 37.5 (25 Jan 2025 00:12)  T(F): 99.4 (25 Jan 2025 09:31), Max: 99.5 (25 Jan 2025 00:12)  HR: 57 (25 Jan 2025 09:31) (55 - 65)  BP: 123/48 (25 Jan 2025 09:31) (113/59 - 123/48)  BP(mean): --  RR: 18 (25 Jan 2025 09:31) (18 - 18)  SpO2: 96% (25 Jan 2025 09:31) (95% - 99%)    Parameters below as of 25 Jan 2025 09:31  Patient On (Oxygen Delivery Method): room air     Physical exam:    Constitutional:  No acute distress  HEENT: NC/AT, EOMI, PERRLA, conjunctivae clear; ears and nose atraumatic; pharynx benign  Neck: supple; thyroid not palpable  Back: no tenderness  Respiratory: respiratory effort normal; clear to auscultation  Cardiovascular: S1S2 regular, no murmurs  Abdomen: soft, not tender, not distended, positive BS; no liver or spleen organomegaly  Genitourinary: no suprapubic tenderness  Lymphatic: no LN palpable  Musculoskeletal: no muscle tenderness, no joint swelling or tenderness  Extremities: no pedal edema  Right inguinal postsurgical wound with VAC  Neurological/ Psychiatric: AxOx3, judgement and insight normal; moving all extremities  Skin: no rashes; no palpable lesions    Labs: reviewed                        8.6    12.66 )-----------( 282      ( 25 Jan 2025 03:39 )             27.6     01-25    134[L]  |  104  |  16  ----------------------------<  110[H]  3.8   |  26  |  0.70    Ca    8.1[L]      25 Jan 2025 03:39  Mg     1.7     01-25    TPro  6.0  /  Alb  2.2[L]  /  TBili  0.6  /  DBili  x   /  AST  20  /  ALT  9[L]  /  AlkPhos  54  01-24                        7.2    14.07 )-----------( 242      ( 24 Jan 2025 07:04 )             24.2     01-24    135  |  104  |  19  ----------------------------<  95  3.2[L]   |  26  |  0.82    Ca    8.2[L]      24 Jan 2025 07:04  Phos  4.3     01-23  Mg     1.5     01-24    TPro  6.0  /  Alb  2.2[L]  /  TBili  0.6  /  DBili  x   /  AST  20  /  ALT  9[L]  /  AlkPhos  54  01-24                        7.9    17.46 )-----------( 286      ( 23 Jan 2025 06:26 )             25.7     01-23    132[L]  |  105  |  25[H]  ----------------------------<  96  4.7   |  22  |  1.21    Ca    8.3[L]      23 Jan 2025 06:26  Phos  4.3     01-23  Mg     1.7     01-23    TPro  6.9  /  Alb  2.6[L]  /  TBili  1.0  /  DBili  x   /  AST  37  /  ALT  9[L]  /  AlkPhos  61  01-23     LIVER FUNCTIONS - ( 23 Jan 2025 06:26 )  Alb: 2.6 g/dL / Pro: 6.9 gm/dL / ALK PHOS: 61 U/L / ALT: 9 U/L / AST: 37 U/L / GGT: x           Urinalysis with Rflx Culture (01-22 @ 11:20)  Urine Appearance: Cloudy  Protein, Urine: Negative mg/dL  Urine Microscopic-Add On (NC) (01-22 @ 11:20)  White Blood Cell - Urine: 158 /HPF  Red Blood Cell - Urine: 1 /HPF    1/8 tissue c/s ENFA and CAGL    Urinalysis with Rflx Culture (collected 22 Jan 2025 11:20)    Culture - Blood (collected 22 Jan 2025 11:20)  Source: .Blood BLOOD  Gram Stain (23 Jan 2025 02:03):    Growth in aerobic bottle: Gram Negative Rods    Growth in anaerobic bottle: Gram Negative Rods  Final Report (24 Jan 2025 11:49):    Growth in aerobic and anaerobic bottles: Escherichia coli ESBL    Direct identification is available within approximately 3-5    hours either by Blood Panel Multiplexed PCR or Direct    MALDI-TOF. Details: [email or web address]  Organism: Blood Culture PCR  Escherichia coli ESBL (24 Jan 2025 11:49)  Organism: Escherichia coli ESBL (24 Jan 2025 11:49)      Method Type: TOYA      -  Ampicillin: R >16 These ampicillin results predict results for amoxicillin      -  Ampicillin/Sulbactam: R 16/8      -  Aztreonam: R >16      -  Cefazolin: R >16      -  Cefepime: R >16      -  Ceftriaxone: R >32      -  Ciprofloxacin: R >2      -  Ertapenem: S <=0.5      -  Gentamicin: S <=2      -  Imipenem: S <=1      -  Levofloxacin: R >4      -  Meropenem: S <=1      -  Piperacillin/Tazobactam: R <=8      -  Tobramycin: S <=2      -  Trimethoprim/Sulfamethoxazole: S <=0.5/9.5  Organism: Blood Culture PCR (24 Jan 2025 11:49)      Method Type: PCR      -  Escherichia coli: Detec      -  ESBL: Detec      -  CTX-M Resistance Marker: Detec    Culture - Urine (collected 22 Jan 2025 11:20)  Source: Catheterized None  Final Report (24 Jan 2025 11:54):    >100,000 CFU/ml Escherichia coli ESBL    >100,000 CFU/ml Escherichia coli ESBL #2    Multiple Morphological Strains  Organism: Escherichia coli ESBL  Escherichia coli ESBL (24 Jan 2025 11:54)  Organism: Escherichia coli ESBL (24 Jan 2025 11:54)      Method Type: TOYA      -  Ampicillin: R >16 These ampicillin results predict results for amoxicillin      -  Ampicillin/Sulbactam: I 16/8      -  Aztreonam: R >16      -  Cefazolin: R >16 For uncomplicated UTI with K. pneumoniae, E. coli, or P. mirablis: TOYA <=16 is sensitive and TOYA >=32 is resistant. This also predicts results for oral agents cefaclor, cefdinir, cefpodoxime, cefprozil, cefuroxime axetil, cephalexin and locarbef for uncomplicated UTI. Note that some isolates may be susceptible to these agents while testing resistant to cefazolin.      -  Cefepime: R >16      -  Ceftriaxone: R >32      -  Cefuroxime: R >16      -  Ciprofloxacin: R >2      -  Ertapenem: S <=0.5      -  Gentamicin: S <=2      -  Imipenem: S <=1      -  Levofloxacin: R >4      -  Meropenem: S <=1      -  Nitrofurantoin: S <=32 Should not be used to treat pyelonephritis      -  Piperacillin/Tazobactam: S <=8      -  Tobramycin: S <=2      -  Trimethoprim/Sulfamethoxazole: S <=0.5/9.5  Organism: Escherichia coli ESBL (24 Jan 2025 11:54)      Method Type: TOYA      -  Ampicillin: R >16 These ampicillin results predict results for amoxicillin      -  Ampicillin/Sulbactam: R >16/8      -  Aztreonam: R >16      -  Cefazolin: R >16 For uncomplicated UTI with K. pneumoniae, E. coli, or P. mirablis: TOYA <=16 is sensitive and TOYA >=32 is resistant. This also predicts results for oral agents cefaclor, cefdinir, cefpodoxime, cefprozil, cefuroxime axetil, cephalexin and locarbef for uncomplicated UTI. Note that some isolates may be susceptible to these agents while testing resistant to cefazolin.      -  Cefepime: R >16      -  Ceftriaxone: R >32      -  Cefuroxime: R >16      -  Ciprofloxacin: R >2      -  Ertapenem: S <=0.5      -  Gentamicin: S <=2      -  Imipenem: S <=1      -  Levofloxacin: R >4      -  Meropenem: S <=1      -  Nitrofurantoin: S <=32 Should not be used to treat pyelonephritis      -  Piperacillin/Tazobactam: S <=8      -  Tobramycin: S <=2      -  Trimethoprim/Sulfamethoxazole: S <=0.5/9.5    Culture - Blood (collected 22 Jan 2025 11:20)  Source: .Blood BLOOD  Gram Stain (23 Jan 2025 02:02):    Growth in anaerobic bottle: Gram Negative Rods    Growth in aerobic bottle: Gram Negative Rods  Final Report (24 Jan 2025 11:45):    Growth in aerobic and anaerobic bottles: Escherichia coli ESBL    See previous culture [id number]    Direct identification is available within approximately 3-5    hours either by Blood Panel Multiplexed PCR or Direct    MALDI-TOF. Details: [email or web address]    Radiology: all available radiological tests reviewed    Advanced directives addressed: full resuscitation

## 2025-01-25 NOTE — PROGRESS NOTE ADULT - SUBJECTIVE AND OBJECTIVE BOX
HOSPITALIST ATTENDING PROGRESS NOTE    Chart and meds reviewed.      Subjective: Patient seen and examined. Resting comfortably.Continue on meropenem and caspofungin.  Patient denies any fevers chills nausea vomiting diarrhea chest pain or shortness of breath.  Continue wet-to-dry dressings until wound VAC.  Possible OR on Thursday for right groin washout Hemoglobin hematocrit stable 8.6/27.6.  Continue IV PPI twice daily. No signs of active bleeding.       Additional results/Imaging, I have personally reviewed:    LABS:                            8.6    12.66 )-----------( 282      ( 25 Jan 2025 03:39 )             27.6     01-25    134[L]  |  104  |  16  ----------------------------<  110[H]  3.8   |  26  |  0.70    Ca    8.1[L]      25 Jan 2025 03:39  Mg     1.7     01-25    TPro  6.0  /  Alb  2.2[L]  /  TBili  0.6  /  DBili  x   /  AST  20  /  ALT  9[L]  /  AlkPhos  54  01-24        LIVER FUNCTIONS - ( 24 Jan 2025 07:04 )  Alb: 2.2 g/dL / Pro: 6.0 gm/dL / ALK PHOS: 54 U/L / ALT: 9 U/L / AST: 20 U/L / GGT: x           PT/INR - ( 25 Jan 2025 03:39 )   PT: 14.5 sec;   INR: 1.23 ratio         PTT - ( 25 Jan 2025 10:22 )  PTT:82.4 sec  Urinalysis Basic - ( 25 Jan 2025 03:39 )    Color: x / Appearance: x / SG: x / pH: x  Gluc: 110 mg/dL / Ketone: x  / Bili: x / Urobili: x   Blood: x / Protein: x / Nitrite: x   Leuk Esterase: x / RBC: x / WBC x   Sq Epi: x / Non Sq Epi: x / Bacteria: x              All other systems reviewed and found to be negative with the exception of what has been described above.    MEDICATIONS  (STANDING):  aMIOdarone    Tablet 200 milliGRAM(s) Oral daily  atorvastatin 10 milliGRAM(s) Oral at bedtime  caspofungin IVPB      caspofungin IVPB 50 milliGRAM(s) IV Intermittent every 24 hours  furosemide    Tablet 40 milliGRAM(s) Oral daily  heparin  Infusion.  Unit(s)/Hr (18 mL/Hr) IV Continuous <Continuous>  influenza  Vaccine (HIGH DOSE) 0.5 milliLiter(s) IntraMuscular once  meropenem Injectable 1000 milliGRAM(s) IV Push every 8 hours  montelukast 10 milliGRAM(s) Oral at bedtime  multivitamin/minerals 1 Tablet(s) Oral daily  naloxone Injectable 0.4 milliGRAM(s) IV Push once  pantoprazole  Injectable 40 milliGRAM(s) IV Push every 12 hours  polyethylene glycol 3350 17 Gram(s) Oral daily  senna 2 Tablet(s) Oral at bedtime  thiamine 100 milliGRAM(s) Oral daily    MEDICATIONS  (PRN):  acetaminophen     Tablet .. 650 milliGRAM(s) Oral every 6 hours PRN Temp greater or equal to 38C (100.4F)  aluminum hydroxide/magnesium hydroxide/simethicone Suspension 30 milliLiter(s) Oral every 4 hours PRN Dyspepsia  bisacodyl 5 milliGRAM(s) Oral daily PRN Constipation  heparin   Injectable 8500 Unit(s) IV Push every 6 hours PRN For aPTT less than 40  heparin   Injectable 4000 Unit(s) IV Push every 6 hours PRN For aPTT between 40 - 57  LORazepam     Tablet 0.5 milliGRAM(s) Oral two times a day PRN Anxiety  melatonin 3 milliGRAM(s) Oral at bedtime PRN Insomnia  ondansetron Injectable 4 milliGRAM(s) IV Push every 6 hours PRN Nausea and/or Vomiting  oxyCODONE    IR 5 milliGRAM(s) Oral every 6 hours PRN Moderate Pain (4 - 6)  oxyCODONE    IR 10 milliGRAM(s) Oral every 6 hours PRN Severe Pain (7 - 10)  zolpidem 5 milliGRAM(s) Oral at bedtime PRN Insomnia      VITALS:  T(F): 99.4 (01-25-25 @ 09:31), Max: 99.5 (01-25-25 @ 00:12)  HR: 57 (01-25-25 @ 09:31) (55 - 63)  BP: 123/48 (01-25-25 @ 09:31) (119/50 - 123/48)  RR: 18 (01-25-25 @ 09:31) (18 - 18)  SpO2: 96% (01-25-25 @ 09:31) (95% - 99%)  Wt(kg): --    I&O's Summary      CAPILLARY BLOOD GLUCOSE          PHYSICAL EXAM:    Constitutional: NAD, awake and alert, obese, chronically ill appearing   HEENT: PERRLA, EOMI, MMM  Respiratory: Breath sounds are clear bilaterally, No wheezing, rales or rhonchi  Cardiovascular: S1 and S2, RRR, no murmurs, gallops or rubs  Gastrointestinal: +BS, soft, non-tender, non-distended, no CVA tenderness  Extremities: No peripheral edema, +DP pulses b/l  Neurological: A&O x 3, no focal deficits  Musculoskeletal: 5/5 strength b/l upper and lower extremities  Skin: Chronic LE venous stasis changes, +R groin wound foul smelling, skin warm and dry    CULTURES:      Telemetry, personally reviewed

## 2025-01-25 NOTE — PROGRESS NOTE ADULT - SUBJECTIVE AND OBJECTIVE BOX
SURGERY DAILY PROGRESS NOTE:     Subjective:  Patient seen and examined at bedside during am rounds. AVSS. Denies any fevers, chills, n/v/d, chest pain or shortness of breath    Objective:    MEDICATIONS  (STANDING):  aMIOdarone    Tablet 200 milliGRAM(s) Oral daily  atorvastatin 10 milliGRAM(s) Oral at bedtime  caspofungin IVPB      caspofungin IVPB 50 milliGRAM(s) IV Intermittent every 24 hours  furosemide    Tablet 40 milliGRAM(s) Oral daily  heparin  Infusion.  Unit(s)/Hr (18 mL/Hr) IV Continuous <Continuous>  influenza  Vaccine (HIGH DOSE) 0.5 milliLiter(s) IntraMuscular once  meropenem Injectable 1000 milliGRAM(s) IV Push every 8 hours  montelukast 10 milliGRAM(s) Oral at bedtime  multivitamin/minerals 1 Tablet(s) Oral daily  naloxone Injectable 0.4 milliGRAM(s) IV Push once  pantoprazole  Injectable 40 milliGRAM(s) IV Push every 12 hours  polyethylene glycol 3350 17 Gram(s) Oral daily  senna 2 Tablet(s) Oral at bedtime  thiamine 100 milliGRAM(s) Oral daily    MEDICATIONS  (PRN):  acetaminophen     Tablet .. 650 milliGRAM(s) Oral every 6 hours PRN Temp greater or equal to 38C (100.4F)  aluminum hydroxide/magnesium hydroxide/simethicone Suspension 30 milliLiter(s) Oral every 4 hours PRN Dyspepsia  bisacodyl 5 milliGRAM(s) Oral daily PRN Constipation  heparin   Injectable 8500 Unit(s) IV Push every 6 hours PRN For aPTT less than 40  heparin   Injectable 4000 Unit(s) IV Push every 6 hours PRN For aPTT between 40 - 57  LORazepam     Tablet 0.5 milliGRAM(s) Oral two times a day PRN Anxiety  melatonin 3 milliGRAM(s) Oral at bedtime PRN Insomnia  ondansetron Injectable 4 milliGRAM(s) IV Push every 6 hours PRN Nausea and/or Vomiting  oxyCODONE    IR 5 milliGRAM(s) Oral every 6 hours PRN Moderate Pain (4 - 6)  oxyCODONE    IR 10 milliGRAM(s) Oral every 6 hours PRN Severe Pain (7 - 10)  zolpidem 5 milliGRAM(s) Oral at bedtime PRN Insomnia      Vital Signs Last 24 Hrs  T(C): 37.5 (2025 00:12), Max: 37.5 (2025 00:12)  T(F): 99.5 (2025 00:12), Max: 99.5 (2025 00:12)  HR: 63 (2025 00:12) (55 - 65)  BP: 119/50 (2025 00:12) (113/59 - 124/45)  BP(mean): --  RR: 18 (2025 00:12) (18 - 18)  SpO2: 95% (2025 00:12) (94% - 99%)    Parameters below as of 2025 00:12  Patient On (Oxygen Delivery Method): room air          PHYSICAL EXAM   Gen: well-appearing, in no acute distress  CV: pulse regularly present   Resp: airway patent, non-labored breathing  Abd: soft, NTND; no rebound or guarding. R groin wound dressing c/d/i  Ext. no cyanosis or edema      I&O's Detail    2025 07:01  -  2025 07:00  --------------------------------------------------------  IN:  Total IN: 0 mL    OUT:    Voided (mL): 350 mL  Total OUT: 350 mL    Total NET: -350 mL          Daily     Daily Weight in k.3 (2025 06:23)    LABS:                        8.4    13.35 )-----------( 268      ( 2025 20:41 )             27.4         135  |  104  |  19  ----------------------------<  95  3.2[L]   |  26  |  0.82    Ca    8.2[L]      2025 07:04  Phos  4.3       Mg     1.5         TPro  6.0  /  Alb  2.2[L]  /  TBili  0.6  /  DBili  x   /  AST  20  /  ALT  9[L]  /  AlkPhos  54      PT/INR - ( 2025 11:24 )   PT: 15.3 sec;   INR: 1.33 ratio         PTT - ( 2025 20:41 )  PTT:47.8 sec  Urinalysis Basic - ( 2025 07:04 )    Color: x / Appearance: x / SG: x / pH: x  Gluc: 95 mg/dL / Ketone: x  / Bili: x / Urobili: x   Blood: x / Protein: x / Nitrite: x   Leuk Esterase: x / RBC: x / WBC x   Sq Epi: x / Non Sq Epi: x / Bacteria: x            ASSESSMENT/PLAN:

## 2025-01-25 NOTE — PROGRESS NOTE ADULT - SUBJECTIVE AND OBJECTIVE BOX
Patient is a 74y old  Female who presents with a chief complaint of fever (25 Jan 2025 11:10)    Patient seen and evaluated at bedside this AM. Patient's Hb remains stable despite heparin challenge. She feels tired and describes pain/discomfort in her groin area. No nausea, vomiting, diarrhea or blood in her stool. She feels overwhelmed by her medical issues reports it's hard to keep it all straight.       MEDICATIONS  (STANDING):  aMIOdarone    Tablet 200 milliGRAM(s) Oral daily  atorvastatin 10 milliGRAM(s) Oral at bedtime  caspofungin IVPB      caspofungin IVPB 50 milliGRAM(s) IV Intermittent every 24 hours  furosemide    Tablet 40 milliGRAM(s) Oral daily  heparin  Infusion.  Unit(s)/Hr (18 mL/Hr) IV Continuous <Continuous>  influenza  Vaccine (HIGH DOSE) 0.5 milliLiter(s) IntraMuscular once  meropenem Injectable 1000 milliGRAM(s) IV Push every 8 hours  montelukast 10 milliGRAM(s) Oral at bedtime  multivitamin/minerals 1 Tablet(s) Oral daily  naloxone Injectable 0.4 milliGRAM(s) IV Push once  pantoprazole  Injectable 40 milliGRAM(s) IV Push every 12 hours  polyethylene glycol 3350 17 Gram(s) Oral daily  senna 2 Tablet(s) Oral at bedtime  thiamine 100 milliGRAM(s) Oral daily    MEDICATIONS  (PRN):  acetaminophen     Tablet .. 650 milliGRAM(s) Oral every 6 hours PRN Temp greater or equal to 38C (100.4F)  aluminum hydroxide/magnesium hydroxide/simethicone Suspension 30 milliLiter(s) Oral every 4 hours PRN Dyspepsia  bisacodyl 5 milliGRAM(s) Oral daily PRN Constipation  heparin   Injectable 8500 Unit(s) IV Push every 6 hours PRN For aPTT less than 40  heparin   Injectable 4000 Unit(s) IV Push every 6 hours PRN For aPTT between 40 - 57  LORazepam     Tablet 0.5 milliGRAM(s) Oral two times a day PRN Anxiety  melatonin 3 milliGRAM(s) Oral at bedtime PRN Insomnia  ondansetron Injectable 4 milliGRAM(s) IV Push every 6 hours PRN Nausea and/or Vomiting  oxyCODONE    IR 5 milliGRAM(s) Oral every 6 hours PRN Moderate Pain (4 - 6)  oxyCODONE    IR 10 milliGRAM(s) Oral every 6 hours PRN Severe Pain (7 - 10)  zolpidem 5 milliGRAM(s) Oral at bedtime PRN Insomnia      Vital Signs Last 24 Hrs  T(C): 37.4 (25 Jan 2025 09:31), Max: 37.5 (25 Jan 2025 00:12)  T(F): 99.4 (25 Jan 2025 09:31), Max: 99.5 (25 Jan 2025 00:12)  HR: 57 (25 Jan 2025 09:31) (55 - 63)  BP: 123/48 (25 Jan 2025 09:31) (119/50 - 123/48)  BP(mean): --  RR: 18 (25 Jan 2025 09:31) (18 - 18)  SpO2: 96% (25 Jan 2025 09:31) (95% - 99%)    Parameters below as of 25 Jan 2025 09:31  Patient On (Oxygen Delivery Method): room air        PHYSICAL EXAM:    Constitutional: No acute distress, well-developed, non-toxic appearing  HEENT: masked, good phonation, not icteric  Neck: supple, no lymphadenopathy  Respiratory: clear to ascultation bilaterally, no wheezing  Cardiovascular: S1 and S2, regular rate and rhythm, no murmurs rubs or gallops  Gastrointestinal: soft, non-tender, non-distended, +bowel sounds, no rebound or guarding, no surgical scars, no drains  Extremities: No peripheral edema, no cyanosis or clubbing  Vascular: 2+ peripheral pulses, no venous stasis  Neurological: A/O x 3, no focal deficits, no asterixis  Psychiatric: Normal mood, normal affect  Skin: No rashes, not jaundiced    LABS:                        8.6    12.66 )-----------( 282      ( 25 Jan 2025 03:39 )             27.6     01-25    134[L]  |  104  |  16  ----------------------------<  110[H]  3.8   |  26  |  0.70    Ca    8.1[L]      25 Jan 2025 03:39  Mg     1.7     01-25    TPro  6.0  /  Alb  2.2[L]  /  TBili  0.6  /  DBili  x   /  AST  20  /  ALT  9[L]  /  AlkPhos  54  01-24    PT/INR - ( 25 Jan 2025 03:39 )   PT: 14.5 sec;   INR: 1.23 ratio         PTT - ( 25 Jan 2025 10:22 )  PTT:82.4 sec  LIVER FUNCTIONS - ( 24 Jan 2025 07:04 )  Alb: 2.2 g/dL / Pro: 6.0 gm/dL / ALK PHOS: 54 U/L / ALT: 9 U/L / AST: 20 U/L / GGT: x             RADIOLOGY & ADDITIONAL STUDIES:

## 2025-01-25 NOTE — PROGRESS NOTE ADULT - ASSESSMENT
74-year-old F, with PMHx of DM, TAVR 8/2024 on Plavix , A-fib on Xarelto, CHF, HLD, PVD, b/l LE venous insufficiency, bilateral CFA stenoses s/p Rt SFA endarterectomy on 12/17/24 with R groin surgical site infection.     PLAN  -Rec wound vac reapplication. Will continue wet to dry dressings daily till WV is available   -Possible OR Tuesday for R groin wound wash out  -Abx per ID  -Rest of care per primary team

## 2025-01-25 NOTE — PROGRESS NOTE ADULT - ASSESSMENT
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 Patient reports she had felt like her usual self since her last discharge until today. She had acute onset subjective fevers, chills, rigors d/t which she came to ED. In ED patient febrile with Tmax of 101.9, Soft BP in 90s/40-50s, HR in 70s. CBC noted for WBC 21, H/H 5.8/19. Admitted for:    #Sepsis 2/2 ESBL E. Coli bacteremia , ESBL E. Coli UTI  #Right inguinal postsurgical bacterial and fungal infection s/p right SFA endarterectomy s/p collection with ENFA and CAGL  - Consulted ICU in ED, patient deemed stable for tele floor  - Tmax 101.9F, WBC 21 -> 14, lactate 3.7 -> 1.6  - RVP negative, CXR clear   - CT RLE as above  - Continue IV meropenem, vanco stopped per ID  - BCx and UCx 1/22 both growing ESBL E. Coli, etiology of bacteremia likely from UTI  - ID consulted , added caspofungin for recent wound cx   - Cardiology consulted, feel less likely presentation c/f endocarditis, holding off on ASHLEY at this time  - Vascular surgery following Dr. Manjarrez   - Wound vac d/c'd per vascular, continue wet to dry dressings daily- restart wound vac when available   - tentative plan for OR on Tuesday for R. ground wound wash out, holding xarelto/plavix at this time, c/w hep gtt   - PRN oxycodone for pain control    #Acute blood loss anemia 2/2 GIB   - Patient on Xarelto for Afib, with guaiac positive test in ED  - CTA abd/pelvis negative for acute bleed  - C/w protonix BID  - Hgb 5.8 --> 7.9 s/p  -> 7.2  , 8.6 today, 4 units totol pRBC  - Placed on heparin gtt to determine if can tolerate AC (xarelto/plavix remain held)   - Hold her metoprolol, cardizem, and aldactone d/t soft BP, resume when appropriate   - GI consulted, no current plan for scope at this time   - Continue to trend H/H    #Chronic heart failure with preserved ejection fraction (HFpEF)  #AS s/p TAVR  -Last ECHO done on 12/05/2024: LVEF 56%, LV cavity moderately dilated, normal wall thickness, normal LVEF, severe (grade 3) LV diastolic dysfunction, severe LA dilatation, mod to moderate RA dilatation, normal RV size and function.   -C/w her lasix, hold her metoprolol and aldactone d/t ABLA and soft BP     -Monitor weights, strict ins and outs.    #paroxsymal Atrial fibrillation   #Elevated troponin   - In sinus rhythm here.   - C/w her amiodarone.   - Holding Cardizem, metoprolol and xarelto d/t soft BP and ABLA, Resume when appropriate  - Troponin trend as above  - Cardiology following Dr Sivan Jimenez     #DM2 (diabetes mellitus)    - Last A1c 5.8 (4/2024)  - Not on any medications.   - Carb restricted diet when appropriate.    #HLD (hyperlipidemia)   - on home statin and Zetia  - C/w her statin.    # Insomnia.  - Ambien PRN    #Anxiety   - trial PO ativan 0.5mg PRN for anxiety     #DVT ppx   - SCDs. heparin gtt  Holding Xarelto as above

## 2025-01-26 LAB
ANION GAP SERPL CALC-SCNC: 5 MMOL/L — SIGNIFICANT CHANGE UP (ref 5–17)
APTT BLD: 86.3 SEC — HIGH (ref 24.5–35.6)
BUN SERPL-MCNC: 9 MG/DL — SIGNIFICANT CHANGE UP (ref 7–23)
CALCIUM SERPL-MCNC: 8.6 MG/DL — SIGNIFICANT CHANGE UP (ref 8.5–10.1)
CHLORIDE SERPL-SCNC: 101 MMOL/L — SIGNIFICANT CHANGE UP (ref 96–108)
CO2 SERPL-SCNC: 29 MMOL/L — SIGNIFICANT CHANGE UP (ref 22–31)
CREAT SERPL-MCNC: 0.55 MG/DL — SIGNIFICANT CHANGE UP (ref 0.5–1.3)
EGFR: 96 ML/MIN/1.73M2 — SIGNIFICANT CHANGE UP
GLUCOSE SERPL-MCNC: 98 MG/DL — SIGNIFICANT CHANGE UP (ref 70–99)
HCT VFR BLD CALC: 28.8 % — LOW (ref 34.5–45)
HGB BLD-MCNC: 8.8 G/DL — LOW (ref 11.5–15.5)
MCHC RBC-ENTMCNC: 26.5 PG — LOW (ref 27–34)
MCHC RBC-ENTMCNC: 30.6 G/DL — LOW (ref 32–36)
MCV RBC AUTO: 86.7 FL — SIGNIFICANT CHANGE UP (ref 80–100)
PLATELET # BLD AUTO: 279 K/UL — SIGNIFICANT CHANGE UP (ref 150–400)
POTASSIUM SERPL-MCNC: 3.7 MMOL/L — SIGNIFICANT CHANGE UP (ref 3.5–5.3)
POTASSIUM SERPL-SCNC: 3.7 MMOL/L — SIGNIFICANT CHANGE UP (ref 3.5–5.3)
RBC # BLD: 3.32 M/UL — LOW (ref 3.8–5.2)
RBC # FLD: 16.8 % — HIGH (ref 10.3–14.5)
SODIUM SERPL-SCNC: 135 MMOL/L — SIGNIFICANT CHANGE UP (ref 135–145)
WBC # BLD: 9.97 K/UL — SIGNIFICANT CHANGE UP (ref 3.8–10.5)
WBC # FLD AUTO: 9.97 K/UL — SIGNIFICANT CHANGE UP (ref 3.8–10.5)

## 2025-01-26 PROCEDURE — 99233 SBSQ HOSP IP/OBS HIGH 50: CPT

## 2025-01-26 RX ORDER — HYDROMORPHONE HYDROCHLORIDE 4 MG/ML
0.5 INJECTION, SOLUTION INTRAMUSCULAR; INTRAVENOUS; SUBCUTANEOUS EVERY 4 HOURS
Refills: 0 | Status: DISCONTINUED | OUTPATIENT
Start: 2025-01-26 | End: 2025-02-02

## 2025-01-26 RX ADMIN — AMIODARONE HYDROCHLORIDE 200 MILLIGRAM(S): 50 INJECTION, SOLUTION INTRAVENOUS at 09:26

## 2025-01-26 RX ADMIN — ONDANSETRON 4 MILLIGRAM(S): 4 TABLET, ORALLY DISINTEGRATING ORAL at 09:25

## 2025-01-26 RX ADMIN — Medication 2 TABLET(S): at 20:08

## 2025-01-26 RX ADMIN — MEROPENEM 1000 MILLIGRAM(S): 500 INJECTION INTRAVENOUS at 20:08

## 2025-01-26 RX ADMIN — PANTOPRAZOLE 40 MILLIGRAM(S): 20 TABLET, DELAYED RELEASE ORAL at 05:02

## 2025-01-26 RX ADMIN — POLYETHYLENE GLYCOL 3350 17 GRAM(S): 17 POWDER, FOR SOLUTION ORAL at 09:26

## 2025-01-26 RX ADMIN — CASPOFUNGIN ACETATE 260 MILLIGRAM(S): 5 INJECTION, POWDER, LYOPHILIZED, FOR SOLUTION INTRAVENOUS at 09:25

## 2025-01-26 RX ADMIN — MONTELUKAST SODIUM 10 MILLIGRAM(S): 5 TABLET, CHEWABLE ORAL at 20:08

## 2025-01-26 RX ADMIN — ONDANSETRON 4 MILLIGRAM(S): 4 TABLET, ORALLY DISINTEGRATING ORAL at 15:47

## 2025-01-26 RX ADMIN — ONDANSETRON 4 MILLIGRAM(S): 4 TABLET, ORALLY DISINTEGRATING ORAL at 23:59

## 2025-01-26 RX ADMIN — ATORVASTATIN CALCIUM 10 MILLIGRAM(S): 80 TABLET, FILM COATED ORAL at 20:08

## 2025-01-26 RX ADMIN — HYDROMORPHONE HYDROCHLORIDE 0.5 MILLIGRAM(S): 4 INJECTION, SOLUTION INTRAMUSCULAR; INTRAVENOUS; SUBCUTANEOUS at 20:25

## 2025-01-26 RX ADMIN — OXYCODONE HYDROCHLORIDE 10 MILLIGRAM(S): 30 TABLET ORAL at 00:15

## 2025-01-26 RX ADMIN — MEROPENEM 1000 MILLIGRAM(S): 500 INJECTION INTRAVENOUS at 05:02

## 2025-01-26 RX ADMIN — Medication 100 MILLIGRAM(S): at 09:26

## 2025-01-26 RX ADMIN — OXYCODONE HYDROCHLORIDE 10 MILLIGRAM(S): 30 TABLET ORAL at 09:26

## 2025-01-26 RX ADMIN — MEROPENEM 1000 MILLIGRAM(S): 500 INJECTION INTRAVENOUS at 15:56

## 2025-01-26 RX ADMIN — Medication 2000 UNIT(S)/HR: at 09:23

## 2025-01-26 RX ADMIN — OXYCODONE HYDROCHLORIDE 5 MILLIGRAM(S): 30 TABLET ORAL at 23:53

## 2025-01-26 RX ADMIN — Medication 40 MILLIGRAM(S): at 09:26

## 2025-01-26 RX ADMIN — PANTOPRAZOLE 40 MILLIGRAM(S): 20 TABLET, DELAYED RELEASE ORAL at 15:56

## 2025-01-26 RX ADMIN — Medication 2000 UNIT(S)/HR: at 19:10

## 2025-01-26 RX ADMIN — HYDROMORPHONE HYDROCHLORIDE 0.5 MILLIGRAM(S): 4 INJECTION, SOLUTION INTRAMUSCULAR; INTRAVENOUS; SUBCUTANEOUS at 15:55

## 2025-01-26 RX ADMIN — Medication 2000 UNIT(S)/HR: at 07:06

## 2025-01-26 RX ADMIN — Medication 0.5 MILLIGRAM(S): at 20:08

## 2025-01-26 RX ADMIN — Medication 1 TABLET(S): at 09:26

## 2025-01-26 NOTE — PROGRESS NOTE ADULT - SUBJECTIVE AND OBJECTIVE BOX
Date of service: 01-26-25 @ 13:01    OOB to chair  Has low grade fever  Denies chills  Right groin tenderness; no discharge     ROS: denies dizziness, no HA, no SOB or cough, no abdominal pain, no diarrhea or constipation; no dysuria, no legs pain, no rashes    MEDICATIONS  (STANDING):  aMIOdarone    Tablet 200 milliGRAM(s) Oral daily  atorvastatin 10 milliGRAM(s) Oral at bedtime  caspofungin IVPB 50 milliGRAM(s) IV Intermittent every 24 hours  caspofungin IVPB      furosemide    Tablet 40 milliGRAM(s) Oral daily  heparin  Infusion.  Unit(s)/Hr (18 mL/Hr) IV Continuous <Continuous>  influenza  Vaccine (HIGH DOSE) 0.5 milliLiter(s) IntraMuscular once  meropenem Injectable 1000 milliGRAM(s) IV Push every 8 hours  montelukast 10 milliGRAM(s) Oral at bedtime  multivitamin/minerals 1 Tablet(s) Oral daily  naloxone Injectable 0.4 milliGRAM(s) IV Push once  pantoprazole  Injectable 40 milliGRAM(s) IV Push every 12 hours  polyethylene glycol 3350 17 Gram(s) Oral daily  senna 2 Tablet(s) Oral at bedtime  thiamine 100 milliGRAM(s) Oral daily    Vital Signs Last 24 Hrs  T(C): 36.8 (26 Jan 2025 08:25), Max: 37.5 (26 Jan 2025 01:24)  T(F): 98.3 (26 Jan 2025 08:25), Max: 99.5 (26 Jan 2025 01:24)  HR: 53 (26 Jan 2025 08:25) (53 - 58)  BP: 127/46 (26 Jan 2025 08:25) (117/56 - 127/46)  BP(mean): --  RR: 18 (26 Jan 2025 08:25) (18 - 18)  SpO2: 96% (26 Jan 2025 08:25) (96% - 99%)    Parameters below as of 26 Jan 2025 08:25  Patient On (Oxygen Delivery Method): room air     Physical exam:    Constitutional:  No acute distress  HEENT: NC/AT, EOMI, PERRLA, conjunctivae clear; ears and nose atraumatic; pharynx benign  Neck: supple; thyroid not palpable  Back: no tenderness  Respiratory: respiratory effort normal; clear to auscultation  Cardiovascular: S1S2 regular, no murmurs  Abdomen: soft, not tender, not distended, positive BS; no liver or spleen organomegaly  Genitourinary: no suprapubic tenderness  Lymphatic: no LN palpable  Musculoskeletal: no muscle tenderness, no joint swelling or tenderness  Extremities: no pedal edema  Right inguinal postsurgical wound with VAC  Neurological/ Psychiatric: AxOx3, judgement and insight normal; moving all extremities  Skin: no rashes; no palpable lesions    Labs: reviewed                        8.8    9.97  )-----------( 279      ( 26 Jan 2025 07:27 )             28.8     01-26    135  |  101  |  9   ----------------------------<  98  3.7   |  29  |  0.55    Ca    8.6      26 Jan 2025 07:27  Mg     1.7     01-25                        8.6    12.66 )-----------( 282      ( 25 Jan 2025 03:39 )             27.6     01-25    134[L]  |  104  |  16  ----------------------------<  110[H]  3.8   |  26  |  0.70    Ca    8.1[L]      25 Jan 2025 03:39  Mg     1.7     01-25    TPro  6.0  /  Alb  2.2[L]  /  TBili  0.6  /  DBili  x   /  AST  20  /  ALT  9[L]  /  AlkPhos  54  01-24                        7.9    17.46 )-----------( 286      ( 23 Jan 2025 06:26 )             25.7     01-23    132[L]  |  105  |  25[H]  ----------------------------<  96  4.7   |  22  |  1.21    Ca    8.3[L]      23 Jan 2025 06:26  Phos  4.3     01-23  Mg     1.7     01-23    TPro  6.9  /  Alb  2.6[L]  /  TBili  1.0  /  DBili  x   /  AST  37  /  ALT  9[L]  /  AlkPhos  61  01-23     LIVER FUNCTIONS - ( 23 Jan 2025 06:26 )  Alb: 2.6 g/dL / Pro: 6.9 gm/dL / ALK PHOS: 61 U/L / ALT: 9 U/L / AST: 37 U/L / GGT: x           Urinalysis with Rflx Culture (01-22 @ 11:20)  Urine Appearance: Cloudy  Protein, Urine: Negative mg/dL  Urine Microscopic-Add On (NC) (01-22 @ 11:20)  White Blood Cell - Urine: 158 /HPF  Red Blood Cell - Urine: 1 /HPF    1/8 tissue c/s ENFA and CAGL    Urinalysis with Rflx Culture (collected 22 Jan 2025 11:20)    Culture - Blood (collected 22 Jan 2025 11:20)  Source: .Blood BLOOD  Gram Stain (23 Jan 2025 02:03):    Growth in aerobic bottle: Gram Negative Rods    Growth in anaerobic bottle: Gram Negative Rods  Final Report (24 Jan 2025 11:49):    Growth in aerobic and anaerobic bottles: Escherichia coli ESBL    Direct identification is available within approximately 3-5    hours either by Blood Panel Multiplexed PCR or Direct    MALDI-TOF. Details: [email or web address]  Organism: Blood Culture PCR  Escherichia coli ESBL (24 Jan 2025 11:49)  Organism: Escherichia coli ESBL (24 Jan 2025 11:49)      Method Type: TOYA      -  Ampicillin: R >16 These ampicillin results predict results for amoxicillin      -  Ampicillin/Sulbactam: R 16/8      -  Aztreonam: R >16      -  Cefazolin: R >16      -  Cefepime: R >16      -  Ceftriaxone: R >32      -  Ciprofloxacin: R >2      -  Ertapenem: S <=0.5      -  Gentamicin: S <=2      -  Imipenem: S <=1      -  Levofloxacin: R >4      -  Meropenem: S <=1      -  Piperacillin/Tazobactam: R <=8      -  Tobramycin: S <=2      -  Trimethoprim/Sulfamethoxazole: S <=0.5/9.5  Organism: Blood Culture PCR (24 Jan 2025 11:49)      Method Type: PCR      -  Escherichia coli: Detec      -  ESBL: Detec      -  CTX-M Resistance Marker: Detec    Culture - Urine (collected 22 Jan 2025 11:20)  Source: Catheterized None  Final Report (24 Jan 2025 11:54):    >100,000 CFU/ml Escherichia coli ESBL    >100,000 CFU/ml Escherichia coli ESBL #2    Multiple Morphological Strains  Organism: Escherichia coli ESBL  Escherichia coli ESBL (24 Jan 2025 11:54)  Organism: Escherichia coli ESBL (24 Jan 2025 11:54)      Method Type: TOYA      -  Ampicillin: R >16 These ampicillin results predict results for amoxicillin      -  Ampicillin/Sulbactam: I 16/8      -  Aztreonam: R >16      -  Cefazolin: R >16 For uncomplicated UTI with K. pneumoniae, E. coli, or P. mirablis: TOYA <=16 is sensitive and TOYA >=32 is resistant. This also predicts results for oral agents cefaclor, cefdinir, cefpodoxime, cefprozil, cefuroxime axetil, cephalexin and locarbef for uncomplicated UTI. Note that some isolates may be susceptible to these agents while testing resistant to cefazolin.      -  Cefepime: R >16      -  Ceftriaxone: R >32      -  Cefuroxime: R >16      -  Ciprofloxacin: R >2      -  Ertapenem: S <=0.5      -  Gentamicin: S <=2      -  Imipenem: S <=1      -  Levofloxacin: R >4      -  Meropenem: S <=1      -  Nitrofurantoin: S <=32 Should not be used to treat pyelonephritis      -  Piperacillin/Tazobactam: S <=8      -  Tobramycin: S <=2      -  Trimethoprim/Sulfamethoxazole: S <=0.5/9.5  Organism: Escherichia coli ESBL (24 Jan 2025 11:54)      Method Type: TOYA      -  Ampicillin: R >16 These ampicillin results predict results for amoxicillin      -  Ampicillin/Sulbactam: R >16/8      -  Aztreonam: R >16      -  Cefazolin: R >16 For uncomplicated UTI with K. pneumoniae, E. coli, or P. mirablis: TOYA <=16 is sensitive and TOYA >=32 is resistant. This also predicts results for oral agents cefaclor, cefdinir, cefpodoxime, cefprozil, cefuroxime axetil, cephalexin and locarbef for uncomplicated UTI. Note that some isolates may be susceptible to these agents while testing resistant to cefazolin.      -  Cefepime: R >16      -  Ceftriaxone: R >32      -  Cefuroxime: R >16      -  Ciprofloxacin: R >2      -  Ertapenem: S <=0.5      -  Gentamicin: S <=2      -  Imipenem: S <=1      -  Levofloxacin: R >4      -  Meropenem: S <=1      -  Nitrofurantoin: S <=32 Should not be used to treat pyelonephritis      -  Piperacillin/Tazobactam: S <=8      -  Tobramycin: S <=2      -  Trimethoprim/Sulfamethoxazole: S <=0.5/9.5    Culture - Blood (collected 22 Jan 2025 11:20)  Source: .Blood BLOOD  Gram Stain (23 Jan 2025 02:02):    Growth in anaerobic bottle: Gram Negative Rods    Growth in aerobic bottle: Gram Negative Rods  Final Report (24 Jan 2025 11:45):    Growth in aerobic and anaerobic bottles: Escherichia coli ESBL    See previous culture [id number]    Direct identification is available within approximately 3-5    hours either by Blood Panel Multiplexed PCR or Direct    MALDI-TOF. Details: [email or web address]    Radiology: all available radiological tests reviewed    Advanced directives addressed: full resuscitation

## 2025-01-26 NOTE — PROGRESS NOTE ADULT - SUBJECTIVE AND OBJECTIVE BOX
HOSPITALIST ATTENDING PROGRESS NOTE    Chart and meds reviewed.      HPI 1/22/25  74-year-old female with past medical history of DM, TAVR 8/2024 on Plavix , A-fib on Xarelto, CHF, HLD, PVD, b/l LE venous insufficiency, bilateral CFA stenoses s/p Rt SFA endarterectomy on 12/17/24 complicated by postoperative seroma requiring irrigation and debridement and wound VAC placement Seen on January 18 for dislodgment of wound VAC tubing presents for evaluation of fever/chills and rigors since last night. Patient reports she had felt like her usual self since her last discharge until today. She had acute onset subjective fevers, chills, rigors d/t which she came to ED. She was discharged from  on 1/18 with a course of ciprofloxacin which she reports being compliant with, reports having "5 pills" left in the course. She denies any N/V, cough, sore throat, rhinitis, chest pain, rash, diarrhea or dysuria.   In ED patient febrile with Tmax of 101.9, Soft BP in 90s/40-50s, HR in 70s. CBC noted for WBC 21, H/H 5.8/19, Plt 441. CMP noted for BUN/Cr 28/1.04. Lactate 2.5 > 3.7. F/u/COVID negative. UA cloudy, with trace ketones, mod leuks, pos nitrites, 158 wbcs. Guaiac positive. CTA of abdomen and b/l LE negative for acute hemorrhage. CXR read as "grossly clear lungs". Patient met sepsis criteria, cultures obtained and patient recieved broad spectrum antibiotics with vancomycin and ceftriaxone in ED. Patient also recieved 2L NS and ordered for 2 U pRBCs. Patient admitted to  for sepsis and symptomatic anemia.       Subjective: Patient seen and examined.     Additional results/Imaging, I have personally reviewed:    LABS:                            8.8    9.97  )-----------( 279      ( 26 Jan 2025 07:27 )             28.8     01-26    135  |  101  |  9   ----------------------------<  98  3.7   |  29  |  0.55    Ca    8.6      26 Jan 2025 07:27  Mg     1.7     01-25            PT/INR - ( 25 Jan 2025 03:39 )   PT: 14.5 sec;   INR: 1.23 ratio         PTT - ( 26 Jan 2025 07:27 )  PTT:86.3 sec  Urinalysis Basic - ( 26 Jan 2025 07:27 )    Color: x / Appearance: x / SG: x / pH: x  Gluc: 98 mg/dL / Ketone: x  / Bili: x / Urobili: x   Blood: x / Protein: x / Nitrite: x   Leuk Esterase: x / RBC: x / WBC x   Sq Epi: x / Non Sq Epi: x / Bacteria: x              All other systems reviewed and found to be negative with the exception of what has been described above.    MEDICATIONS  (STANDING):  aMIOdarone    Tablet 200 milliGRAM(s) Oral daily  atorvastatin 10 milliGRAM(s) Oral at bedtime  caspofungin IVPB      caspofungin IVPB 50 milliGRAM(s) IV Intermittent every 24 hours  furosemide    Tablet 40 milliGRAM(s) Oral daily  heparin  Infusion.  Unit(s)/Hr (18 mL/Hr) IV Continuous <Continuous>  influenza  Vaccine (HIGH DOSE) 0.5 milliLiter(s) IntraMuscular once  meropenem Injectable 1000 milliGRAM(s) IV Push every 8 hours  montelukast 10 milliGRAM(s) Oral at bedtime  multivitamin/minerals 1 Tablet(s) Oral daily  naloxone Injectable 0.4 milliGRAM(s) IV Push once  pantoprazole  Injectable 40 milliGRAM(s) IV Push every 12 hours  polyethylene glycol 3350 17 Gram(s) Oral daily  senna 2 Tablet(s) Oral at bedtime  thiamine 100 milliGRAM(s) Oral daily    MEDICATIONS  (PRN):  acetaminophen     Tablet .. 650 milliGRAM(s) Oral every 6 hours PRN Temp greater or equal to 38C (100.4F)  aluminum hydroxide/magnesium hydroxide/simethicone Suspension 30 milliLiter(s) Oral every 4 hours PRN Dyspepsia  bisacodyl 5 milliGRAM(s) Oral daily PRN Constipation  heparin   Injectable 8500 Unit(s) IV Push every 6 hours PRN For aPTT less than 40  heparin   Injectable 4000 Unit(s) IV Push every 6 hours PRN For aPTT between 40 - 57  HYDROmorphone  Injectable 0.5 milliGRAM(s) IV Push every 4 hours PRN Severe Pain (7 - 10)  LORazepam     Tablet 0.5 milliGRAM(s) Oral two times a day PRN Anxiety  melatonin 3 milliGRAM(s) Oral at bedtime PRN Insomnia  ondansetron Injectable 4 milliGRAM(s) IV Push every 6 hours PRN Nausea and/or Vomiting  oxyCODONE    IR 5 milliGRAM(s) Oral every 6 hours PRN Moderate Pain (4 - 6)  zolpidem 5 milliGRAM(s) Oral at bedtime PRN Insomnia      VITALS:  T(F): 98.3 (01-26-25 @ 08:25), Max: 99.5 (01-26-25 @ 01:24)  HR: 53 (01-26-25 @ 08:25) (53 - 58)  BP: 127/46 (01-26-25 @ 08:25) (117/56 - 127/46)  RR: 18 (01-26-25 @ 08:25) (18 - 18)  SpO2: 96% (01-26-25 @ 08:25) (96% - 99%)  Wt(kg): --    I&O's Summary    25 Jan 2025 07:01  -  26 Jan 2025 07:00  --------------------------------------------------------  IN: 0 mL / OUT: 750 mL / NET: -750 mL        CAPILLARY BLOOD GLUCOSE          PHYSICAL EXAM:  Constitutional: NAD, awake and alert, obese, chronically ill appearing   HEENT: PERRLA, EOMI, MMM  Respiratory: Breath sounds are clear bilaterally, No wheezing, rales or rhonchi  Cardiovascular: S1 and S2, RRR, no murmurs, gallops or rubs  Gastrointestinal: +BS, soft, non-tender, non-distended, no CVA tenderness  Extremities: No peripheral edema, +DP pulses b/l  Neurological: A&O x 3, no focal deficits  Musculoskeletal: 5/5 strength b/l upper and lower extremities  Skin: Chronic LE venous stasis changes, +R groin wound foul smelling, skin warm and dry      CULTURES:      Telemetry, personally reviewed HOSPITALIST ATTENDING PROGRESS NOTE    Chart and meds reviewed.      HPI 1/22/25  74-year-old female with past medical history of DM, TAVR 8/2024 on Plavix , A-fib on Xarelto, CHF, HLD, PVD, b/l LE venous insufficiency, bilateral CFA stenoses s/p Rt SFA endarterectomy on 12/17/24 complicated by postoperative seroma requiring irrigation and debridement and wound VAC placement Seen on January 18 for dislodgment of wound VAC tubing presents for evaluation of fever/chills and rigors since last night. Patient reports she had felt like her usual self since her last discharge until today. She had acute onset subjective fevers, chills, rigors d/t which she came to ED. She was discharged from  on 1/18 with a course of ciprofloxacin which she reports being compliant with, reports having "5 pills" left in the course. She denies any N/V, cough, sore throat, rhinitis, chest pain, rash, diarrhea or dysuria.   In ED patient febrile with Tmax of 101.9, Soft BP in 90s/40-50s, HR in 70s. CBC noted for WBC 21, H/H 5.8/19, Plt 441. CMP noted for BUN/Cr 28/1.04. Lactate 2.5 > 3.7. F/u/COVID negative. UA cloudy, with trace ketones, mod leuks, pos nitrites, 158 wbcs. Guaiac positive. CTA of abdomen and b/l LE negative for acute hemorrhage. CXR read as "grossly clear lungs". Patient met sepsis criteria, cultures obtained and patient recieved broad spectrum antibiotics with vancomycin and ceftriaxone in ED. Patient also recieved 2L NS and ordered for 2 U pRBCs. Patient admitted to  for sepsis and symptomatic anemia.       Subjective: Patient seen and examined.  Patient reports that her lower extremity wound pain is worse today compared to yesterday.  Patient in mild distress due to the pain.  Vascular surgery attempted wound VAC reapplication today however patient refused.   at bedside.  Patient denies any chest pain shortness of breath nausea vomiting diarrhea.  Continue IV antibiotics and antifungal.  WBC is improved to 9.97.  Hemoglobin stable at 8.8.     Additional results/Imaging, I have personally reviewed:    LABS:                            8.8    9.97  )-----------( 279      ( 26 Jan 2025 07:27 )             28.8     01-26    135  |  101  |  9   ----------------------------<  98  3.7   |  29  |  0.55    Ca    8.6      26 Jan 2025 07:27  Mg     1.7     01-25            PT/INR - ( 25 Jan 2025 03:39 )   PT: 14.5 sec;   INR: 1.23 ratio         PTT - ( 26 Jan 2025 07:27 )  PTT:86.3 sec  Urinalysis Basic - ( 26 Jan 2025 07:27 )    Color: x / Appearance: x / SG: x / pH: x  Gluc: 98 mg/dL / Ketone: x  / Bili: x / Urobili: x   Blood: x / Protein: x / Nitrite: x   Leuk Esterase: x / RBC: x / WBC x   Sq Epi: x / Non Sq Epi: x / Bacteria: x              All other systems reviewed and found to be negative with the exception of what has been described above.    MEDICATIONS  (STANDING):  aMIOdarone    Tablet 200 milliGRAM(s) Oral daily  atorvastatin 10 milliGRAM(s) Oral at bedtime  caspofungin IVPB      caspofungin IVPB 50 milliGRAM(s) IV Intermittent every 24 hours  furosemide    Tablet 40 milliGRAM(s) Oral daily  heparin  Infusion.  Unit(s)/Hr (18 mL/Hr) IV Continuous <Continuous>  influenza  Vaccine (HIGH DOSE) 0.5 milliLiter(s) IntraMuscular once  meropenem Injectable 1000 milliGRAM(s) IV Push every 8 hours  montelukast 10 milliGRAM(s) Oral at bedtime  multivitamin/minerals 1 Tablet(s) Oral daily  naloxone Injectable 0.4 milliGRAM(s) IV Push once  pantoprazole  Injectable 40 milliGRAM(s) IV Push every 12 hours  polyethylene glycol 3350 17 Gram(s) Oral daily  senna 2 Tablet(s) Oral at bedtime  thiamine 100 milliGRAM(s) Oral daily    MEDICATIONS  (PRN):  acetaminophen     Tablet .. 650 milliGRAM(s) Oral every 6 hours PRN Temp greater or equal to 38C (100.4F)  aluminum hydroxide/magnesium hydroxide/simethicone Suspension 30 milliLiter(s) Oral every 4 hours PRN Dyspepsia  bisacodyl 5 milliGRAM(s) Oral daily PRN Constipation  heparin   Injectable 8500 Unit(s) IV Push every 6 hours PRN For aPTT less than 40  heparin   Injectable 4000 Unit(s) IV Push every 6 hours PRN For aPTT between 40 - 57  HYDROmorphone  Injectable 0.5 milliGRAM(s) IV Push every 4 hours PRN Severe Pain (7 - 10)  LORazepam     Tablet 0.5 milliGRAM(s) Oral two times a day PRN Anxiety  melatonin 3 milliGRAM(s) Oral at bedtime PRN Insomnia  ondansetron Injectable 4 milliGRAM(s) IV Push every 6 hours PRN Nausea and/or Vomiting  oxyCODONE    IR 5 milliGRAM(s) Oral every 6 hours PRN Moderate Pain (4 - 6)  zolpidem 5 milliGRAM(s) Oral at bedtime PRN Insomnia      VITALS:  T(F): 98.3 (01-26-25 @ 08:25), Max: 99.5 (01-26-25 @ 01:24)  HR: 53 (01-26-25 @ 08:25) (53 - 58)  BP: 127/46 (01-26-25 @ 08:25) (117/56 - 127/46)  RR: 18 (01-26-25 @ 08:25) (18 - 18)  SpO2: 96% (01-26-25 @ 08:25) (96% - 99%)  Wt(kg): --    I&O's Summary    25 Jan 2025 07:01  -  26 Jan 2025 07:00  --------------------------------------------------------  IN: 0 mL / OUT: 750 mL / NET: -750 mL        CAPILLARY BLOOD GLUCOSE          PHYSICAL EXAM:  Constitutional: NAD, awake and alert, obese, chronically ill appearing   HEENT: PERRLA, EOMI, MMM  Respiratory: Breath sounds are clear bilaterally, No wheezing, rales or rhonchi  Cardiovascular: S1 and S2, RRR, no murmurs, gallops or rubs  Gastrointestinal: +BS, soft, non-tender, non-distended, no CVA tenderness  Extremities: No peripheral edema, +DP pulses b/l  Neurological: A&O x 3, no focal deficits  Musculoskeletal: 5/5 strength b/l upper and lower extremities  Skin: Chronic LE venous stasis changes, +R groin wound foul smelling, skin warm and dry      CULTURES:      Telemetry, personally reviewed

## 2025-01-26 NOTE — PROGRESS NOTE ADULT - ASSESSMENT
74-year-old F, with PMHx of DM, TAVR 8/2024 on Plavix , A-fib on Xarelto, CHF, HLD, PVD, b/l LE venous insufficiency, bilateral CFA stenoses s/p Rt SFA endarterectomy on 12/17/24 with R groin surgical site infection.     PLAN  -Pt refused wound vac reapplication.  Will continue wet to dry dressings daily   - Incentive spirometer, PT consult, out of bed to chair  -Possible OR Tuesday for R groin wound wash out  -Abx per ID  -Rest of care per primary team

## 2025-01-26 NOTE — PROGRESS NOTE ADULT - ASSESSMENT
74-year-old Female with h/o DM type 2, TAVR 8/2024 on Plavix , A-fib on Xarelto, CHF, HLD, PVD, b/l LE venous insufficiency, bilateral CFA stenoses s/p Rt SFA endarterectomy on 12/17/24, recently hospitalized on 1/3 with right inguinal cellulitis/ postsurgical infection s/p right SFA endarterectomy with underlying inguinal collection treated with vancomycin 750 mg IV q12h, cefepime 2 gm IV q12h and metronidazole 500 mg IV q8h, then PO abx with PO cipro was admitted on 1/22 fever to Tmax of 101.9F, Soft BP in 90s/40-50s. Patient met sepsis criteria, cultures obtained and patient received vancomycin IV and ceftriaxone, then meropenem.    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 UTI with ESBL E.coli  #PVD  #Allergy to PCN  -cultures reviewed  -on meropenem 1 gm IV q8h and caspofungin 50 mg IV qd # 4  -tolerating abx well so far; no side effects noted  -monitor closely in ej of PCN allergy history  -surgical evaluation appreciated - she may need further washout  -no need for vancomycin at present time  -local wound care  -cardiology evaluation appreciated   -continue abx coverage   -repeat BC x 2   -f/u cultures  -monitor temps  -f/u CBC  -supportive care  2. Other issues:   -care per medicine    d/w medicine team    The patient may need prolonged IV antimicrobial therapy

## 2025-01-26 NOTE — PROGRESS NOTE ADULT - ASSESSMENT
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 Patient reports she had felt like her usual self since her last discharge until today. She had acute onset subjective fevers, chills, rigors d/t which she came to ED. In ED patient febrile with Tmax of 101.9, Soft BP in 90s/40-50s, HR in 70s. CBC noted for WBC 21, H/H 5.8/19. Admitted for:    #Sepsis 2/2 ESBL E. Coli bacteremia , ESBL E. Coli UTI  #Right inguinal postsurgical bacterial and fungal infection s/p right SFA endarterectomy s/p collection with ENFA and CAGL  - Consulted ICU in ED, patient deemed stable for tele floor  - Tmax 101.9F, WBC 21 -> 14, lactate 3.7 -> 1.6  - RVP negative, CXR clear   - CT RLE as above  - ID consulted , added caspofungin for recent wound cx   - vanco stopped per ID  - Started IV meropenem 100mg q8h and caspofungin 50 mg IV QD on 1/23 (Day 4)   - BCx and UCx 1/22 both growing ESBL E. Coli, etiology of bacteremia likely from UTI  - Cardiology consulted, feel less likely presentation c/f endocarditis, holding off on ASHLEY at this time  - Vascular surgery following Dr. Manjarrez   - Wound vac d/c'd per vascular, continue wet to dry dressings daily- restart wound vac when available   -Patient declined wound vac placement today  - tentative plan for OR on Tuesday for R. ground wound wash out, holding xarelto/plavix at this time, c/w hep gtt   - PRN oxycodone for pain control  Will escalate to IV dilaudid 0.5mg q5 for severe pain     #Acute blood loss anemia 2/2 GIB   - Patient on Xarelto for Afib, with guaiac positive test in ED  - CTA abd/pelvis negative for acute bleed  - C/w protonix BID  - Hgb 5.8 4 units totol pRBC- 8.8 today and stable   - Placed on heparin gtt to determine if can tolerate AC (xarelto/plavix remain held)   - Hold her metoprolol, cardizem, and aldactone d/t soft BP, resume when appropriate   - GI consulted, no current plan for scope at this time   - Continue to trend H/H    #Chronic heart failure with preserved ejection fraction (HFpEF)  #AS s/p TAVR  -Last ECHO done on 12/05/2024: LVEF 56%, LV cavity moderately dilated, normal wall thickness, normal LVEF, severe (grade 3) LV diastolic dysfunction, severe LA dilatation, mod to moderate RA dilatation, normal RV size and function.   -C/w her lasix, hold her metoprolol and aldactone d/t ABLA and soft BP     -Monitor weights, strict ins and outs.    #paroxsymal Atrial fibrillation   #Elevated troponin   - In sinus rhythm here.   - C/w her amiodarone.   - Holding Cardizem, metoprolol and xarelto d/t soft BP and ABLA, Resume when appropriate  - Troponin trend as above  - Cardiology following Dr Sivan Jimenez     #DM2 (diabetes mellitus)    - Last A1c 5.8 (4/2024)  - Not on any medications.   - Carb restricted diet when appropriate.    #HLD (hyperlipidemia)   - on home statin and Zetia  - C/w her statin.    # Insomnia.  - Ambien PRN    #Anxiety   - trial PO ativan 0.5mg PRN for anxiety     #DVT ppx   - SCDs. heparin gtt  Holding Xarelto as above   74-year-old female with past medical history of DM, TAVR 8/2024 on Plavix , A-fib on Xarelto, CHF, HLD, PVD, b/l LE venous insufficiency, bilateral CFA stenoses s/p Rt SFA endarterectomy on 12/17/24 complicated by postoperative seroma requiring irrigation and debridement and wound VAC placement Seen on January 18 for dislodgment of wound VAC tubing presents for evaluation of fever/chills and rigors since last night. Patient reports she had felt like her usual self since her last discharge until today. She had acute onset subjective fevers, chills, rigors d/t which she came to ED. In ED patient febrile with Tmax of 101.9, Soft BP in 90s/40-50s, HR in 70s. CBC noted for WBC 21, H/H 5.8/19. Admitted for:    #Sepsis 2/2 ESBL E. Coli bacteremia , ESBL E. Coli UTI  #Right inguinal postsurgical bacterial and fungal infection s/p right SFA endarterectomy s/p collection with ENFA and CAGL  - Consulted ICU in ED, patient deemed stable for tele floor  - Tmax 101.9F, WBC 21 -> 14, lactate 3.7 -> 1.6  - RVP negative, CXR clear   - CT RLE as above  - ID consulted , added caspofungin for recent wound cx   - vanco stopped per ID  - Started IV meropenem 100mg q8h and caspofungin 50 mg IV QD on 1/23 (Day 4)   - BCx and UCx 1/22 both growing ESBL E. Coli, etiology of bacteremia likely from UTI  - Cardiology consulted, feel less likely presentation c/f endocarditis, holding off on ASHLEY at this time  - Vascular surgery following Dr. Manjarrez   - Wound vac d/c'd per vascular, continue wet to dry dressings daily- restart wound vac when available   -Patient declined wound vac placement today  - tentative plan for OR on Tuesday for R. groin wound wash out, holding xarelto/plavix at this time, c/w hep gtt in anticipation of procedure  - PRN oxycodone for pain control  Will escalate pain management with IV dilaudid 0.5mg q5 for severe pain     #Acute blood loss anemia 2/2 GIB   - Patient on Xarelto for Afib, with guaiac positive test in ED  - CTA abd/pelvis negative for acute bleed  - C/w protonix BID  - Hgb 5.8 4 units totol pRBC- 8.8 today and stable   - Placed on heparin gtt to determine if can tolerate AC (xarelto/plavix remain held)   - Hold her metoprolol, cardizem, and aldactone d/t soft BP, resume when appropriate   - GI consulted, no current plan for scope at this time   - Continue to trend H/H    #Chronic heart failure with preserved ejection fraction (HFpEF)  #AS s/p TAVR  -Last ECHO done on 12/05/2024: LVEF 56%, LV cavity moderately dilated, normal wall thickness, normal LVEF, severe (grade 3) LV diastolic dysfunction, severe LA dilatation, mod to moderate RA dilatation, normal RV size and function.   -C/w her lasix, hold her metoprolol and aldactone d/t ABLA and soft BP     -Monitor weights, strict ins and outs.    #paroxsymal Atrial fibrillation   #Elevated troponin   - In sinus rhythm here.   - C/w her amiodarone.   - Holding Cardizem, metoprolol and xarelto d/t soft BP and ABLA, Resume when appropriate  - Troponin trend as above  - Cardiology following Dr Sivan Jimenez     #DM2 (diabetes mellitus)    - Last A1c 5.8 (4/2024)  - Not on any medications.   - Carb restricted diet when appropriate.    #HLD (hyperlipidemia)   - on home statin and Zetia  - C/w her statin.    # Insomnia.  - Ambien PRN    #Anxiety   - trial PO ativan 0.5mg PRN for anxiety     #DVT ppx   - SCDs. heparin gtt  Holding Xarelto as above in anticipation of procedure

## 2025-01-26 NOTE — PROGRESS NOTE ADULT - SUBJECTIVE AND OBJECTIVE BOX
SURGERY DAILY PROGRESS NOTE:     Subjective:  Patient seen and examined at bedside during am rounds. AVSS. Denies any fevers, chills, n/v/d, chest pain or shortness of breath    Objective:    MEDICATIONS  (STANDING):  aMIOdarone    Tablet 200 milliGRAM(s) Oral daily  atorvastatin 10 milliGRAM(s) Oral at bedtime  caspofungin IVPB      caspofungin IVPB 50 milliGRAM(s) IV Intermittent every 24 hours  furosemide    Tablet 40 milliGRAM(s) Oral daily  heparin  Infusion.  Unit(s)/Hr (18 mL/Hr) IV Continuous <Continuous>  influenza  Vaccine (HIGH DOSE) 0.5 milliLiter(s) IntraMuscular once  meropenem Injectable 1000 milliGRAM(s) IV Push every 8 hours  montelukast 10 milliGRAM(s) Oral at bedtime  multivitamin/minerals 1 Tablet(s) Oral daily  naloxone Injectable 0.4 milliGRAM(s) IV Push once  pantoprazole  Injectable 40 milliGRAM(s) IV Push every 12 hours  polyethylene glycol 3350 17 Gram(s) Oral daily  senna 2 Tablet(s) Oral at bedtime  thiamine 100 milliGRAM(s) Oral daily    MEDICATIONS  (PRN):  acetaminophen     Tablet .. 650 milliGRAM(s) Oral every 6 hours PRN Temp greater or equal to 38C (100.4F)  aluminum hydroxide/magnesium hydroxide/simethicone Suspension 30 milliLiter(s) Oral every 4 hours PRN Dyspepsia  bisacodyl 5 milliGRAM(s) Oral daily PRN Constipation  heparin   Injectable 8500 Unit(s) IV Push every 6 hours PRN For aPTT less than 40  heparin   Injectable 4000 Unit(s) IV Push every 6 hours PRN For aPTT between 40 - 57  LORazepam     Tablet 0.5 milliGRAM(s) Oral two times a day PRN Anxiety  melatonin 3 milliGRAM(s) Oral at bedtime PRN Insomnia  ondansetron Injectable 4 milliGRAM(s) IV Push every 6 hours PRN Nausea and/or Vomiting  oxyCODONE    IR 5 milliGRAM(s) Oral every 6 hours PRN Moderate Pain (4 - 6)  oxyCODONE    IR 10 milliGRAM(s) Oral every 6 hours PRN Severe Pain (7 - 10)  zolpidem 5 milliGRAM(s) Oral at bedtime PRN Insomnia      Vital Signs Last 24 Hrs  T(C): 37.5 (2025 01:24), Max: 37.5 (2025 01:24)  T(F): 99.5 (:24), Max: 99.5 (2025 01:24)  HR: 56 (:24) (56 - 58)  BP: 117/56 (:24) (117/56 - 125/50)  BP(mean): --  RR: 18 (:24) (18 - 18)  SpO2: 98% (:24) (96% - 99%)    Parameters below as of 2025 01:24  Patient On (Oxygen Delivery Method): room air          PHYSICAL EXAM   Gen: well-appearing, in no acute distress  CV: pulse regularly present   Resp: airway patent, non-labored breathing  Abd: soft, NTND; no rebound or guarding. Incision c/d/i      I&O's Detail    2025 07:01  -  2025 07:00  --------------------------------------------------------  IN:  Total IN: 0 mL    OUT:    Voided (mL): 750 mL  Total OUT: 750 mL    Total NET: -750 mL          Daily     Daily Weight in k (2025 06:30)    LABS:                        8.6    12.66 )-----------( 282      ( 2025 03:39 )             27.6     01-    134[L]  |  104  |  16  ----------------------------<  110[H]  3.8   |  26  |  0.70    Ca    8.1[L]      2025 03:39  Mg     1.7           PT/INR - ( 2025 03:39 )   PT: 14.5 sec;   INR: 1.23 ratio         PTT - ( 2025 10:22 )  PTT:82.4 sec  Urinalysis Basic - ( 2025 03:39 )    Color: x / Appearance: x / SG: x / pH: x  Gluc: 110 mg/dL / Ketone: x  / Bili: x / Urobili: x   Blood: x / Protein: x / Nitrite: x   Leuk Esterase: x / RBC: x / WBC x   Sq Epi: x / Non Sq Epi: x / Bacteria: x        RADIOLOGY & ADDITIONAL STUDIES:    ASSESSMENT/PLAN:

## 2025-01-27 LAB
ANION GAP SERPL CALC-SCNC: 6 MMOL/L — SIGNIFICANT CHANGE UP (ref 5–17)
APTT BLD: 62.9 SEC — HIGH (ref 24.5–35.6)
BUN SERPL-MCNC: 8 MG/DL — SIGNIFICANT CHANGE UP (ref 7–23)
CALCIUM SERPL-MCNC: 9.1 MG/DL — SIGNIFICANT CHANGE UP (ref 8.5–10.1)
CHLORIDE SERPL-SCNC: 99 MMOL/L — SIGNIFICANT CHANGE UP (ref 96–108)
CO2 SERPL-SCNC: 31 MMOL/L — SIGNIFICANT CHANGE UP (ref 22–31)
CREAT SERPL-MCNC: 0.62 MG/DL — SIGNIFICANT CHANGE UP (ref 0.5–1.3)
EGFR: 93 ML/MIN/1.73M2 — SIGNIFICANT CHANGE UP
GLUCOSE SERPL-MCNC: 92 MG/DL — SIGNIFICANT CHANGE UP (ref 70–99)
HCT VFR BLD CALC: 29.2 % — LOW (ref 34.5–45)
HGB BLD-MCNC: 8.9 G/DL — LOW (ref 11.5–15.5)
MCHC RBC-ENTMCNC: 26.1 PG — LOW (ref 27–34)
MCHC RBC-ENTMCNC: 30.5 G/DL — LOW (ref 32–36)
MCV RBC AUTO: 85.6 FL — SIGNIFICANT CHANGE UP (ref 80–100)
PLATELET # BLD AUTO: 306 K/UL — SIGNIFICANT CHANGE UP (ref 150–400)
POTASSIUM SERPL-MCNC: 3.4 MMOL/L — LOW (ref 3.5–5.3)
POTASSIUM SERPL-SCNC: 3.4 MMOL/L — LOW (ref 3.5–5.3)
RBC # BLD: 3.41 M/UL — LOW (ref 3.8–5.2)
RBC # FLD: 16.8 % — HIGH (ref 10.3–14.5)
SODIUM SERPL-SCNC: 136 MMOL/L — SIGNIFICANT CHANGE UP (ref 135–145)
WBC # BLD: 10.05 K/UL — SIGNIFICANT CHANGE UP (ref 3.8–10.5)
WBC # FLD AUTO: 10.05 K/UL — SIGNIFICANT CHANGE UP (ref 3.8–10.5)

## 2025-01-27 PROCEDURE — 99232 SBSQ HOSP IP/OBS MODERATE 35: CPT | Mod: 24

## 2025-01-27 PROCEDURE — 99233 SBSQ HOSP IP/OBS HIGH 50: CPT

## 2025-01-27 RX ORDER — HEPARIN SODIUM,PORCINE 10000/ML
VIAL (ML) INJECTION
Qty: 25000 | Refills: 0 | Status: DISCONTINUED | OUTPATIENT
Start: 2025-01-27 | End: 2025-01-27

## 2025-01-27 RX ORDER — ACETAMINOPHEN 160 MG/5ML
1000 SUSPENSION ORAL ONCE
Refills: 0 | Status: COMPLETED | OUTPATIENT
Start: 2025-01-27 | End: 2025-01-27

## 2025-01-27 RX ORDER — HYDROMORPHONE HYDROCHLORIDE 4 MG/ML
1 INJECTION, SOLUTION INTRAMUSCULAR; INTRAVENOUS; SUBCUTANEOUS ONCE
Refills: 0 | Status: DISCONTINUED | OUTPATIENT
Start: 2025-01-27 | End: 2025-01-27

## 2025-01-27 RX ORDER — BACTERIOSTATIC SODIUM CHLORIDE 0.9 %
1000 VIAL (ML) INJECTION
Refills: 0 | Status: DISCONTINUED | OUTPATIENT
Start: 2025-01-28 | End: 2025-01-28

## 2025-01-27 RX ORDER — POTASSIUM CHLORIDE 750 MG/1
40 TABLET, EXTENDED RELEASE ORAL ONCE
Refills: 0 | Status: COMPLETED | OUTPATIENT
Start: 2025-01-27 | End: 2025-01-27

## 2025-01-27 RX ORDER — HEPARIN SODIUM,PORCINE 10000/ML
2000 VIAL (ML) INJECTION
Qty: 25000 | Refills: 0 | Status: DISCONTINUED | OUTPATIENT
Start: 2025-01-27 | End: 2025-01-28

## 2025-01-27 RX ADMIN — Medication 40 MILLIGRAM(S): at 11:01

## 2025-01-27 RX ADMIN — MEROPENEM 1000 MILLIGRAM(S): 500 INJECTION INTRAVENOUS at 05:06

## 2025-01-27 RX ADMIN — OXYCODONE HYDROCHLORIDE 5 MILLIGRAM(S): 30 TABLET ORAL at 06:38

## 2025-01-27 RX ADMIN — HYDROMORPHONE HYDROCHLORIDE 1 MILLIGRAM(S): 4 INJECTION, SOLUTION INTRAMUSCULAR; INTRAVENOUS; SUBCUTANEOUS at 16:54

## 2025-01-27 RX ADMIN — HYDROMORPHONE HYDROCHLORIDE 0.5 MILLIGRAM(S): 4 INJECTION, SOLUTION INTRAMUSCULAR; INTRAVENOUS; SUBCUTANEOUS at 13:27

## 2025-01-27 RX ADMIN — Medication 1 TABLET(S): at 11:02

## 2025-01-27 RX ADMIN — Medication 2000 UNIT(S)/HR: at 18:55

## 2025-01-27 RX ADMIN — HYDROMORPHONE HYDROCHLORIDE 0.5 MILLIGRAM(S): 4 INJECTION, SOLUTION INTRAMUSCULAR; INTRAVENOUS; SUBCUTANEOUS at 20:58

## 2025-01-27 RX ADMIN — MEROPENEM 1000 MILLIGRAM(S): 500 INJECTION INTRAVENOUS at 20:59

## 2025-01-27 RX ADMIN — ACETAMINOPHEN 400 MILLIGRAM(S): 160 SUSPENSION ORAL at 18:55

## 2025-01-27 RX ADMIN — MEROPENEM 1000 MILLIGRAM(S): 500 INJECTION INTRAVENOUS at 13:28

## 2025-01-27 RX ADMIN — MONTELUKAST SODIUM 10 MILLIGRAM(S): 5 TABLET, CHEWABLE ORAL at 21:00

## 2025-01-27 RX ADMIN — PANTOPRAZOLE 40 MILLIGRAM(S): 20 TABLET, DELAYED RELEASE ORAL at 16:58

## 2025-01-27 RX ADMIN — ATORVASTATIN CALCIUM 10 MILLIGRAM(S): 80 TABLET, FILM COATED ORAL at 21:00

## 2025-01-27 RX ADMIN — CASPOFUNGIN ACETATE 260 MILLIGRAM(S): 5 INJECTION, POWDER, LYOPHILIZED, FOR SOLUTION INTRAVENOUS at 11:09

## 2025-01-27 RX ADMIN — Medication 2000 UNIT(S)/HR: at 22:39

## 2025-01-27 RX ADMIN — Medication 100 MILLIGRAM(S): at 11:01

## 2025-01-27 RX ADMIN — Medication 2000 UNIT(S)/HR: at 08:16

## 2025-01-27 RX ADMIN — Medication 2000 UNIT(S)/HR: at 07:16

## 2025-01-27 RX ADMIN — PANTOPRAZOLE 40 MILLIGRAM(S): 20 TABLET, DELAYED RELEASE ORAL at 05:06

## 2025-01-27 RX ADMIN — Medication 2 TABLET(S): at 20:59

## 2025-01-27 RX ADMIN — AMIODARONE HYDROCHLORIDE 200 MILLIGRAM(S): 50 INJECTION, SOLUTION INTRAVENOUS at 11:01

## 2025-01-27 NOTE — PROGRESS NOTE ADULT - ASSESSMENT
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 Patient reports she had felt like her usual self since her last discharge until today. She had acute onset subjective fevers, chills, rigors d/t which she came to ED. In ED patient febrile with Tmax of 101.9, Soft BP in 90s/40-50s, HR in 70s. CBC noted for WBC 21, H/H 5.8/19. Admitted for:    #Sepsis 2/2 ESBL E. Coli bacteremia , ESBL E. Coli UTI  #Right inguinal postsurgical bacterial and fungal infection s/p right SFA endarterectomy s/p collection with ENFA and CAGL  - Consulted ICU in ED, patient deemed stable for tele floor  - Tmax 101.9F, WBC 21 -> 14, lactate 3.7 -> 1.6  - RVP negative, CXR clear   - CT RLE as above  - ID consulted , added caspofungin for recent wound cx   - vanco stopped per ID  - Started IV meropenem 100mg q8h and caspofungin 50 mg IV QD on 1/23 (Day 4)   - BCx and UCx 1/22 both growing ESBL E. Coli, etiology of bacteremia likely from UTI  - Cardiology consulted, feel less likely presentation c/f endocarditis, holding off on ASHLEY at this time  - Vascular surgery following Dr. Manjarrez   - Wound vac d/c'd per vascular, continue wet to dry dressings daily- restart wound vac when available   -Patient declined wound vac placement today  - tentative plan for OR on Tuesday for R. groin wound wash out, holding xarelto/plavix at this time, c/w hep gtt in anticipation of procedure  - PRN oxycodone for pain controlWill escalate pain management with IV dilaudid 0.5mg q5 for severe pain     #Acute blood loss anemia 2/2 GIB   - Patient on Xarelto for Afib, with guaiac positive test in ED  - CTA abd/pelvis negative for acute bleed  - C/w protonix BID  - Hgb 5.8 4 units totol pRBC- 8.8 today and stable   - Placed on heparin gtt to determine if can tolerate AC (xarelto/plavix remain held)   - Hold her metoprolol, cardizem, and aldactone d/t soft BP, resume when appropriate   - GI consulted, no current plan for scope at this time   - Continue to trend H/H    #Chronic heart failure with preserved ejection fraction (HFpEF)  #AS s/p TAVR  -Last ECHO done on 12/05/2024: LVEF 56%, LV cavity moderately dilated, normal wall thickness, normal LVEF, severe (grade 3) LV diastolic dysfunction, severe LA dilatation, mod to moderate RA dilatation, normal RV size and function.   -C/w her lasix, hold her metoprolol and aldactone d/t ABLA and soft BP     -Monitor weights, strict ins and outs.    #paroxsymal Atrial fibrillation   #Elevated troponin   - In sinus rhythm here.   - C/w her amiodarone.   - Holding Cardizem, metoprolol and xarelto d/t soft BP and ABLA, Resume when appropriate  - Troponin trend as above  - Cardiology following Dr Sivan Gasca     #DM2 (diabetes mellitus)    - Last A1c 5.8 (4/2024)  - Not on any medications.   - Carb restricted diet when appropriate.#HLD (hyperlipidemia)   - on home statin and Zetia  - C/w her statin.    # Insomnia.  - Ambien PRN    #Anxiety   - trial PO ativan 0.5mg PRN for anxiety     #DVT ppx   - SCDs. heparin gtt  Holding Xarelto as above in anticipation of procedure     dispo tele sinus ankush 50-60 no events , not in acute heart failure, medically no acute contraindications for OR by vascular for wash out r angie i nadege gasca  total time spent 55mins

## 2025-01-27 NOTE — PROGRESS NOTE ADULT - ASSESSMENT
74-year-old Female with h/o DM type 2, TAVR 8/2024 on Plavix , A-fib on Xarelto, CHF, HLD, PVD, b/l LE venous insufficiency, bilateral CFA stenoses s/p Rt SFA endarterectomy on 12/17/24, recently hospitalized on 1/3 with right inguinal cellulitis/ postsurgical infection s/p right SFA endarterectomy with underlying inguinal collection treated with vancomycin 750 mg IV q12h, cefepime 2 gm IV q12h and metronidazole 500 mg IV q8h, then PO abx with PO cipro was admitted on 1/22 fever to Tmax of 101.9F, Soft BP in 90s/40-50s. Patient met sepsis criteria, cultures obtained and patient received vancomycin IV and ceftriaxone, then meropenem.    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 TAVR  #Pyuria. UTI with ESBL E.coli  #PVD  #Allergy to PCN  -cultures reviewed  -on meropenem 1 gm IV q8h and caspofungin 50 mg IV qd # 5  -tolerating abx well so far; no side effects noted  -monitor closely in ej of PCN allergy history  -surgical evaluation appreciated - she may need further washout  -no need for vancomycin at present time  -local wound care  -cardiology evaluation appreciated   -continue abx coverage   -repeat BC x 2   -f/u cultures  -monitor temps  -f/u CBC  -supportive care  2. Other issues:   -care per medicine    d/w medicine team    The patient may need prolonged IV antimicrobial therapy

## 2025-01-27 NOTE — PROGRESS NOTE ADULT - SUBJECTIVE AND OBJECTIVE BOX
Date of service: 01-27-25 @ 11:39    Lying in bed in NAD  Alert and verbal  Has leg pains  No fever    ROS: no fever or chills; denies dizziness, no HA, no SOB or cough, no abdominal pain, no diarrhea or constipation; no dysuria, no legs pain, no rashes    MEDICATIONS  (STANDING):  aMIOdarone    Tablet 200 milliGRAM(s) Oral daily  atorvastatin 10 milliGRAM(s) Oral at bedtime  caspofungin IVPB      caspofungin IVPB 50 milliGRAM(s) IV Intermittent every 24 hours  furosemide    Tablet 40 milliGRAM(s) Oral daily  heparin  Infusion.  Unit(s)/Hr (18 mL/Hr) IV Continuous <Continuous>  influenza  Vaccine (HIGH DOSE) 0.5 milliLiter(s) IntraMuscular once  meropenem Injectable 1000 milliGRAM(s) IV Push every 8 hours  montelukast 10 milliGRAM(s) Oral at bedtime  multivitamin/minerals 1 Tablet(s) Oral daily  naloxone Injectable 0.4 milliGRAM(s) IV Push once  pantoprazole  Injectable 40 milliGRAM(s) IV Push every 12 hours  polyethylene glycol 3350 17 Gram(s) Oral daily  senna 2 Tablet(s) Oral at bedtime  thiamine 100 milliGRAM(s) Oral daily    Vital Signs Last 24 Hrs  T(C): 36.7 (27 Jan 2025 09:04), Max: 37.1 (26 Jan 2025 16:38)  T(F): 98.1 (27 Jan 2025 09:04), Max: 98.8 (26 Jan 2025 16:38)  HR: 51 (27 Jan 2025 09:04) (51 - 59)  BP: 137/55 (27 Jan 2025 09:04) (131/56 - 139/61)  BP(mean): --  RR: 18 (27 Jan 2025 09:04) (18 - 18)  SpO2: 98% (27 Jan 2025 09:04) (92% - 98%)    Parameters below as of 27 Jan 2025 09:04  Patient On (Oxygen Delivery Method): room air     Physical exam:    Constitutional:  No acute distress  HEENT: NC/AT, EOMI, PERRLA, conjunctivae clear; ears and nose atraumatic; pharynx benign  Neck: supple; thyroid not palpable  Back: no tenderness  Respiratory: respiratory effort normal; clear to auscultation  Cardiovascular: S1S2 regular, no murmurs  Abdomen: soft, not tender, not distended, positive BS; no liver or spleen organomegaly  Genitourinary: no suprapubic tenderness  Lymphatic: no LN palpable  Musculoskeletal: no muscle tenderness, no joint swelling or tenderness  Extremities: no pedal edema  Right inguinal postsurgical wound with VAC  Neurological/ Psychiatric: AxOx3, judgement and insight normal; moving all extremities  Skin: no rashes; no palpable lesions    Labs: reviewed                        8.9    10.05 )-----------( 306      ( 27 Jan 2025 06:51 )             29.2     01-27    136  |  99  |  8   ----------------------------<  92  3.4[L]   |  31  |  0.62    Ca    9.1      27 Jan 2025 06:51                        8.8    9.97  )-----------( 279      ( 26 Jan 2025 07:27 )             28.8     01-26    135  |  101  |  9   ----------------------------<  98  3.7   |  29  |  0.55    Ca    8.6      26 Jan 2025 07:27  Mg     1.7     01-25                        8.6    12.66 )-----------( 282      ( 25 Jan 2025 03:39 )             27.6     01-25    134[L]  |  104  |  16  ----------------------------<  110[H]  3.8   |  26  |  0.70    Ca    8.1[L]      25 Jan 2025 03:39  Mg     1.7     01-25    TPro  6.0  /  Alb  2.2[L]  /  TBili  0.6  /  DBili  x   /  AST  20  /  ALT  9[L]  /  AlkPhos  54  01-24                        7.9    17.46 )-----------( 286      ( 23 Jan 2025 06:26 )             25.7     01-23    132[L]  |  105  |  25[H]  ----------------------------<  96  4.7   |  22  |  1.21    Ca    8.3[L]      23 Jan 2025 06:26  Phos  4.3     01-23  Mg     1.7     01-23    TPro  6.9  /  Alb  2.6[L]  /  TBili  1.0  /  DBili  x   /  AST  37  /  ALT  9[L]  /  AlkPhos  61  01-23     LIVER FUNCTIONS - ( 23 Jan 2025 06:26 )  Alb: 2.6 g/dL / Pro: 6.9 gm/dL / ALK PHOS: 61 U/L / ALT: 9 U/L / AST: 37 U/L / GGT: x           Urinalysis with Rflx Culture (01-22 @ 11:20)  Urine Appearance: Cloudy  Protein, Urine: Negative mg/dL  Urine Microscopic-Add On (NC) (01-22 @ 11:20)  White Blood Cell - Urine: 158 /HPF  Red Blood Cell - Urine: 1 /HPF    1/8 tissue c/s ENFA and CAGL    Urinalysis with Rflx Culture (collected 22 Jan 2025 11:20)    Culture - Blood (collected 22 Jan 2025 11:20)  Source: .Blood BLOOD  Gram Stain (23 Jan 2025 02:03):    Growth in aerobic bottle: Gram Negative Rods    Growth in anaerobic bottle: Gram Negative Rods  Final Report (24 Jan 2025 11:49):    Growth in aerobic and anaerobic bottles: Escherichia coli ESBL    Direct identification is available within approximately 3-5    hours either by Blood Panel Multiplexed PCR or Direct    MALDI-TOF. Details: [email or web address]  Organism: Blood Culture PCR  Escherichia coli ESBL (24 Jan 2025 11:49)  Organism: Escherichia coli ESBL (24 Jan 2025 11:49)      Method Type: TOYA      -  Ampicillin: R >16 These ampicillin results predict results for amoxicillin      -  Ampicillin/Sulbactam: R 16/8      -  Aztreonam: R >16      -  Cefazolin: R >16      -  Cefepime: R >16      -  Ceftriaxone: R >32      -  Ciprofloxacin: R >2      -  Ertapenem: S <=0.5      -  Gentamicin: S <=2      -  Imipenem: S <=1      -  Levofloxacin: R >4      -  Meropenem: S <=1      -  Piperacillin/Tazobactam: R <=8      -  Tobramycin: S <=2      -  Trimethoprim/Sulfamethoxazole: S <=0.5/9.5  Organism: Blood Culture PCR (24 Jan 2025 11:49)      Method Type: PCR      -  Escherichia coli: Detec      -  ESBL: Detec      -  CTX-M Resistance Marker: Detec    Culture - Urine (collected 22 Jan 2025 11:20)  Source: Catheterized None  Final Report (24 Jan 2025 11:54):    >100,000 CFU/ml Escherichia coli ESBL    >100,000 CFU/ml Escherichia coli ESBL #2    Multiple Morphological Strains  Organism: Escherichia coli ESBL  Escherichia coli ESBL (24 Jan 2025 11:54)  Organism: Escherichia coli ESBL (24 Jan 2025 11:54)      Method Type: TOYA      -  Ampicillin: R >16 These ampicillin results predict results for amoxicillin      -  Ampicillin/Sulbactam: I 16/8      -  Aztreonam: R >16      -  Cefazolin: R >16 For uncomplicated UTI with K. pneumoniae, E. coli, or P. mirablis: TOYA <=16 is sensitive and TOYA >=32 is resistant. This also predicts results for oral agents cefaclor, cefdinir, cefpodoxime, cefprozil, cefuroxime axetil, cephalexin and locarbef for uncomplicated UTI. Note that some isolates may be susceptible to these agents while testing resistant to cefazolin.      -  Cefepime: R >16      -  Ceftriaxone: R >32      -  Cefuroxime: R >16      -  Ciprofloxacin: R >2      -  Ertapenem: S <=0.5      -  Gentamicin: S <=2      -  Imipenem: S <=1      -  Levofloxacin: R >4      -  Meropenem: S <=1      -  Nitrofurantoin: S <=32 Should not be used to treat pyelonephritis      -  Piperacillin/Tazobactam: S <=8      -  Tobramycin: S <=2      -  Trimethoprim/Sulfamethoxazole: S <=0.5/9.5  Organism: Escherichia coli ESBL (24 Jan 2025 11:54)      Method Type: TOYA      -  Ampicillin: R >16 These ampicillin results predict results for amoxicillin      -  Ampicillin/Sulbactam: R >16/8      -  Aztreonam: R >16      -  Cefazolin: R >16 For uncomplicated UTI with K. pneumoniae, E. coli, or P. mirablis: TOYA <=16 is sensitive and TOYA >=32 is resistant. This also predicts results for oral agents cefaclor, cefdinir, cefpodoxime, cefprozil, cefuroxime axetil, cephalexin and locarbef for uncomplicated UTI. Note that some isolates may be susceptible to these agents while testing resistant to cefazolin.      -  Cefepime: R >16      -  Ceftriaxone: R >32      -  Cefuroxime: R >16      -  Ciprofloxacin: R >2      -  Ertapenem: S <=0.5      -  Gentamicin: S <=2      -  Imipenem: S <=1      -  Levofloxacin: R >4      -  Meropenem: S <=1      -  Nitrofurantoin: S <=32 Should not be used to treat pyelonephritis      -  Piperacillin/Tazobactam: S <=8      -  Tobramycin: S <=2      -  Trimethoprim/Sulfamethoxazole: S <=0.5/9.5    Culture - Blood (collected 22 Jan 2025 11:20)  Source: .Blood BLOOD  Gram Stain (23 Jan 2025 02:02):    Growth in anaerobic bottle: Gram Negative Rods    Growth in aerobic bottle: Gram Negative Rods  Final Report (24 Jan 2025 11:45):    Growth in aerobic and anaerobic bottles: Escherichia coli ESBL    See previous culture [id number]    Direct identification is available within approximately 3-5    hours either by Blood Panel Multiplexed PCR or Direct    MALDI-TOF. Details: [email or web address]    Radiology: all available radiological tests reviewed    Advanced directives addressed: full resuscitation

## 2025-01-27 NOTE — PROGRESS NOTE ADULT - SUBJECTIVE AND OBJECTIVE BOX
SURGERY DAILY PROGRESS NOTE:     Subjective:  Patient seen and examined at bedside during am rounds. AVSS. Denies any fevers, chills, n/v/d, chest pain or shortness of breath    Objective:    MEDICATIONS  (STANDING):  aMIOdarone    Tablet 200 milliGRAM(s) Oral daily  atorvastatin 10 milliGRAM(s) Oral at bedtime  caspofungin IVPB      caspofungin IVPB 50 milliGRAM(s) IV Intermittent every 24 hours  furosemide    Tablet 40 milliGRAM(s) Oral daily  heparin  Infusion.  Unit(s)/Hr (18 mL/Hr) IV Continuous <Continuous>  influenza  Vaccine (HIGH DOSE) 0.5 milliLiter(s) IntraMuscular once  meropenem Injectable 1000 milliGRAM(s) IV Push every 8 hours  montelukast 10 milliGRAM(s) Oral at bedtime  multivitamin/minerals 1 Tablet(s) Oral daily  naloxone Injectable 0.4 milliGRAM(s) IV Push once  pantoprazole  Injectable 40 milliGRAM(s) IV Push every 12 hours  polyethylene glycol 3350 17 Gram(s) Oral daily  senna 2 Tablet(s) Oral at bedtime  thiamine 100 milliGRAM(s) Oral daily    MEDICATIONS  (PRN):  acetaminophen     Tablet .. 650 milliGRAM(s) Oral every 6 hours PRN Temp greater or equal to 38C (100.4F)  aluminum hydroxide/magnesium hydroxide/simethicone Suspension 30 milliLiter(s) Oral every 4 hours PRN Dyspepsia  bisacodyl 5 milliGRAM(s) Oral daily PRN Constipation  heparin   Injectable 8500 Unit(s) IV Push every 6 hours PRN For aPTT less than 40  heparin   Injectable 4000 Unit(s) IV Push every 6 hours PRN For aPTT between 40 - 57  HYDROmorphone  Injectable 0.5 milliGRAM(s) IV Push every 4 hours PRN Severe Pain (7 - 10)  LORazepam     Tablet 0.5 milliGRAM(s) Oral two times a day PRN Anxiety  melatonin 3 milliGRAM(s) Oral at bedtime PRN Insomnia  ondansetron Injectable 4 milliGRAM(s) IV Push every 6 hours PRN Nausea and/or Vomiting  oxyCODONE    IR 5 milliGRAM(s) Oral every 6 hours PRN Moderate Pain (4 - 6)  zolpidem 5 milliGRAM(s) Oral at bedtime PRN Insomnia      Vital Signs Last 24 Hrs  T(C): 36.9 (2025 23:54), Max: 37.1 (2025 16:38)  T(F): 98.4 (2025 23:54), Max: 98.8 (2025 16:38)  HR: 59 (2025 23:54) (53 - 59)  BP: 131/56 (2025 23:54) (127/46 - 139/61)  BP(mean): --  RR: 18 (2025 23:54) (18 - 18)  SpO2: 98% (2025 23:54) (92% - 98%)    Parameters below as of 2025 23:54  Patient On (Oxygen Delivery Method): room air          PHYSICAL EXAM   Gen: well-appearing, in no acute distress  CV: pulse regularly present   Resp: airway patent, non-labored breathing  Abd: soft, NTND; no rebound or guarding. R groin wound   Ext. no cyanosis or edema      I&O's Detail      Daily     Daily Weight in k (2025 06:22)    LABS:                        8.9    10.05 )-----------( 306      ( 2025 06:51 )             29.2     -    135  |  101  |  9   ----------------------------<  98  3.7   |  29  |  0.55    Ca    8.6      2025 07:27      PTT - ( 2025 06:51 )  PTT:62.9 sec  Urinalysis Basic - ( 2025 07:27 )    Color: x / Appearance: x / SG: x / pH: x  Gluc: 98 mg/dL / Ketone: x  / Bili: x / Urobili: x   Blood: x / Protein: x / Nitrite: x   Leuk Esterase: x / RBC: x / WBC x   Sq Epi: x / Non Sq Epi: x / Bacteria: x

## 2025-01-27 NOTE — PROGRESS NOTE ADULT - SUBJECTIVE AND OBJECTIVE BOX
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 Patient reports she had felt like her usual self since her last discharge until today. She had acute onset subjective fevers, chills, rigors d/t which she came to ED. She was discharged from  on 1/18 with a course of ciprofloxacin which she reports being compliant with, reports having "5 pills" left in the course. She denies any N/V, cough, sore throat, rhinitis, chest pain, rash, diarrhea or dysuria.   In ED patient febrile with Tmax of 101.9, Soft BP in 90s/40-50s, HR in 70s. CBC noted for WBC 21, H/H 5.8/19, Plt 441. CMP noted for BUN/Cr 28/1.04. Lactate 2.5 > 3.7. F/u/COVID negative. UA cloudy, with trace ketones, mod leuks, pos nitrites, 158 wbcs. Guaiac positive. CTA of abdomen and b/l LE negative for acute hemorrhage. CXR read as "grossly clear lungs". Patient met sepsis criteria, cultures obtained and patient recieved broad spectrum antibiotics with vancomycin and ceftriaxone in ED. Patient also recieved 2L NS and ordered for 2 U pRBCs. Patient admitted to  for sepsis and symptomatic anemia.       Subjective: Patient seen and examined.  Patient reports that her lower extremity wound pain is worse today compared to yesterday.  Patient in mild distress due to the pain.  Vascular surgery attempted wound VAC reapplication today however patient refused.   at bedside.  Patient denies any chest pain shortness of breath nausea vomiting diarrhea.  Continue IV antibiotics and antifungal.  WBC is improved to 9.97.  Hemoglobin stable at 8.8.     Additional results/Imaging, I have personally reviewed:    LABS:All other systems reviewed and found to be negative with the exception of what has been described above.PHYSICAL EXAM:  Constitutional: NAD, awake and alert, obese, chronically ill appearing   HEENT: PERRLA, EOMI, MMM  Respiratory: Breath sounds are clear bilaterally, No wheezing, rales or rhonchi  Cardiovascular: S1 and S2, RRR, no murmurs, gallops or rubs  Gastrointestinal: +BS, soft, non-tender, non-distended, no CVA tenderness  Extremities: No peripheral edema, +DP pulses b/l  Neurological: A&O x 3, no focal deficits  Musculoskeletal: 5/5 strength b/l upper and lower extremities  Skin: Chronic LE venous stasis changes, +R groin wound foul smelling, skin warm and dry    labs reviewed

## 2025-01-28 LAB
ANION GAP SERPL CALC-SCNC: 4 MMOL/L — LOW (ref 5–17)
APTT BLD: 39.8 SEC — HIGH (ref 24.5–35.6)
APTT BLD: 76.7 SEC — HIGH (ref 24.5–35.6)
BASOPHILS # BLD AUTO: 0.03 K/UL — SIGNIFICANT CHANGE UP (ref 0–0.2)
BASOPHILS NFR BLD AUTO: 0.5 % — SIGNIFICANT CHANGE UP (ref 0–2)
BUN SERPL-MCNC: 11 MG/DL — SIGNIFICANT CHANGE UP (ref 7–23)
CALCIUM SERPL-MCNC: 8.6 MG/DL — SIGNIFICANT CHANGE UP (ref 8.5–10.1)
CHLORIDE SERPL-SCNC: 99 MMOL/L — SIGNIFICANT CHANGE UP (ref 96–108)
CO2 SERPL-SCNC: 33 MMOL/L — HIGH (ref 22–31)
CREAT SERPL-MCNC: 0.63 MG/DL — SIGNIFICANT CHANGE UP (ref 0.5–1.3)
EGFR: 92 ML/MIN/1.73M2 — SIGNIFICANT CHANGE UP
EOSINOPHIL # BLD AUTO: 0.27 K/UL — SIGNIFICANT CHANGE UP (ref 0–0.5)
EOSINOPHIL NFR BLD AUTO: 4.6 % — SIGNIFICANT CHANGE UP (ref 0–6)
GLUCOSE BLDC GLUCOMTR-MCNC: 105 MG/DL — HIGH (ref 70–99)
GLUCOSE SERPL-MCNC: 100 MG/DL — HIGH (ref 70–99)
HCT VFR BLD CALC: 29.2 % — LOW (ref 34.5–45)
HCT VFR BLD CALC: 29.4 % — LOW (ref 34.5–45)
HGB BLD-MCNC: 8.8 G/DL — LOW (ref 11.5–15.5)
HGB BLD-MCNC: 8.8 G/DL — LOW (ref 11.5–15.5)
IMM GRANULOCYTES NFR BLD AUTO: 0.7 % — SIGNIFICANT CHANGE UP (ref 0–0.9)
INR BLD: 1.23 RATIO — HIGH (ref 0.85–1.16)
LYMPHOCYTES # BLD AUTO: 0.88 K/UL — LOW (ref 1–3.3)
LYMPHOCYTES # BLD AUTO: 14.9 % — SIGNIFICANT CHANGE UP (ref 13–44)
MAGNESIUM SERPL-MCNC: 1.6 MG/DL — SIGNIFICANT CHANGE UP (ref 1.6–2.6)
MCHC RBC-ENTMCNC: 26 PG — LOW (ref 27–34)
MCHC RBC-ENTMCNC: 26.2 PG — LOW (ref 27–34)
MCHC RBC-ENTMCNC: 29.9 G/DL — LOW (ref 32–36)
MCHC RBC-ENTMCNC: 30.1 G/DL — LOW (ref 32–36)
MCV RBC AUTO: 86.9 FL — SIGNIFICANT CHANGE UP (ref 80–100)
MCV RBC AUTO: 87 FL — SIGNIFICANT CHANGE UP (ref 80–100)
MONOCYTES # BLD AUTO: 0.5 K/UL — SIGNIFICANT CHANGE UP (ref 0–0.9)
MONOCYTES NFR BLD AUTO: 8.5 % — SIGNIFICANT CHANGE UP (ref 2–14)
NEUTROPHILS # BLD AUTO: 4.18 K/UL — SIGNIFICANT CHANGE UP (ref 1.8–7.4)
NEUTROPHILS NFR BLD AUTO: 70.8 % — SIGNIFICANT CHANGE UP (ref 43–77)
PHOSPHATE SERPL-MCNC: 3.4 MG/DL — SIGNIFICANT CHANGE UP (ref 2.5–4.5)
PLATELET # BLD AUTO: 282 K/UL — SIGNIFICANT CHANGE UP (ref 150–400)
PLATELET # BLD AUTO: 295 K/UL — SIGNIFICANT CHANGE UP (ref 150–400)
POTASSIUM SERPL-MCNC: 3.6 MMOL/L — SIGNIFICANT CHANGE UP (ref 3.5–5.3)
POTASSIUM SERPL-SCNC: 3.6 MMOL/L — SIGNIFICANT CHANGE UP (ref 3.5–5.3)
PROTHROM AB SERPL-ACNC: 14.1 SEC — HIGH (ref 9.9–13.4)
RBC # BLD: 3.36 M/UL — LOW (ref 3.8–5.2)
RBC # BLD: 3.38 M/UL — LOW (ref 3.8–5.2)
RBC # FLD: 16.6 % — HIGH (ref 10.3–14.5)
RBC # FLD: 16.8 % — HIGH (ref 10.3–14.5)
SODIUM SERPL-SCNC: 136 MMOL/L — SIGNIFICANT CHANGE UP (ref 135–145)
WBC # BLD: 5.9 K/UL — SIGNIFICANT CHANGE UP (ref 3.8–10.5)
WBC # BLD: 7.36 K/UL — SIGNIFICANT CHANGE UP (ref 3.8–10.5)
WBC # FLD AUTO: 5.9 K/UL — SIGNIFICANT CHANGE UP (ref 3.8–10.5)
WBC # FLD AUTO: 7.36 K/UL — SIGNIFICANT CHANGE UP (ref 3.8–10.5)

## 2025-01-28 PROCEDURE — 99233 SBSQ HOSP IP/OBS HIGH 50: CPT

## 2025-01-28 PROCEDURE — 99232 SBSQ HOSP IP/OBS MODERATE 35: CPT

## 2025-01-28 PROCEDURE — 99497 ADVNCD CARE PLAN 30 MIN: CPT

## 2025-01-28 RX ORDER — FENTANYL CITRATE 50 UG/ML
50 INJECTION INTRAMUSCULAR; INTRAVENOUS
Refills: 0 | Status: DISCONTINUED | OUTPATIENT
Start: 2025-01-28 | End: 2025-01-28

## 2025-01-28 RX ORDER — POTASSIUM CHLORIDE 750 MG/1
40 TABLET, EXTENDED RELEASE ORAL ONCE
Refills: 0 | Status: COMPLETED | OUTPATIENT
Start: 2025-01-28 | End: 2025-01-28

## 2025-01-28 RX ORDER — ONDANSETRON 4 MG/1
4 TABLET, ORALLY DISINTEGRATING ORAL ONCE
Refills: 0 | Status: DISCONTINUED | OUTPATIENT
Start: 2025-01-28 | End: 2025-01-28

## 2025-01-28 RX ORDER — HEPARIN SODIUM,PORCINE 10000/ML
5000 VIAL (ML) INJECTION EVERY 8 HOURS
Refills: 0 | Status: DISCONTINUED | OUTPATIENT
Start: 2025-01-28 | End: 2025-01-29

## 2025-01-28 RX ORDER — SODIUM CHLORIDE 9 G/ML
1000 INJECTION, SOLUTION INTRAVENOUS
Refills: 0 | Status: DISCONTINUED | OUTPATIENT
Start: 2025-01-28 | End: 2025-01-28

## 2025-01-28 RX ADMIN — MEROPENEM 1000 MILLIGRAM(S): 500 INJECTION INTRAVENOUS at 05:22

## 2025-01-28 RX ADMIN — POTASSIUM CHLORIDE 40 MILLIEQUIVALENT(S): 750 TABLET, EXTENDED RELEASE ORAL at 18:32

## 2025-01-28 RX ADMIN — HYDROMORPHONE HYDROCHLORIDE 0.5 MILLIGRAM(S): 4 INJECTION, SOLUTION INTRAMUSCULAR; INTRAVENOUS; SUBCUTANEOUS at 23:57

## 2025-01-28 RX ADMIN — Medication 2000 UNIT(S)/HR: at 03:57

## 2025-01-28 RX ADMIN — FENTANYL CITRATE 50 MICROGRAM(S): 50 INJECTION INTRAMUSCULAR; INTRAVENOUS at 16:55

## 2025-01-28 RX ADMIN — Medication 1 TABLET(S): at 10:28

## 2025-01-28 RX ADMIN — Medication 2000 UNIT(S)/HR: at 02:09

## 2025-01-28 RX ADMIN — POLYETHYLENE GLYCOL 3350 17 GRAM(S): 17 POWDER, FOR SOLUTION ORAL at 18:30

## 2025-01-28 RX ADMIN — Medication 5000 UNIT(S): at 21:27

## 2025-01-28 RX ADMIN — Medication 2 TABLET(S): at 21:27

## 2025-01-28 RX ADMIN — ATORVASTATIN CALCIUM 10 MILLIGRAM(S): 80 TABLET, FILM COATED ORAL at 21:28

## 2025-01-28 RX ADMIN — FENTANYL CITRATE 50 MICROGRAM(S): 50 INJECTION INTRAMUSCULAR; INTRAVENOUS at 17:00

## 2025-01-28 RX ADMIN — PANTOPRAZOLE 40 MILLIGRAM(S): 20 TABLET, DELAYED RELEASE ORAL at 05:23

## 2025-01-28 RX ADMIN — Medication 1 DROP(S): at 05:22

## 2025-01-28 RX ADMIN — Medication 75 MILLILITER(S): at 01:04

## 2025-01-28 RX ADMIN — Medication 100 MILLIGRAM(S): at 10:30

## 2025-01-28 RX ADMIN — Medication 40 MILLIGRAM(S): at 10:28

## 2025-01-28 RX ADMIN — MONTELUKAST SODIUM 10 MILLIGRAM(S): 5 TABLET, CHEWABLE ORAL at 21:28

## 2025-01-28 RX ADMIN — HYDROMORPHONE HYDROCHLORIDE 0.5 MILLIGRAM(S): 4 INJECTION, SOLUTION INTRAMUSCULAR; INTRAVENOUS; SUBCUTANEOUS at 01:01

## 2025-01-28 RX ADMIN — HYDROMORPHONE HYDROCHLORIDE 0.5 MILLIGRAM(S): 4 INJECTION, SOLUTION INTRAMUSCULAR; INTRAVENOUS; SUBCUTANEOUS at 19:08

## 2025-01-28 RX ADMIN — ZOLPIDEM TARTRATE 5 MILLIGRAM(S): 5 TABLET, COATED ORAL at 23:57

## 2025-01-28 RX ADMIN — MEROPENEM 1000 MILLIGRAM(S): 500 INJECTION INTRAVENOUS at 18:30

## 2025-01-28 RX ADMIN — AMIODARONE HYDROCHLORIDE 200 MILLIGRAM(S): 50 INJECTION, SOLUTION INTRAVENOUS at 10:28

## 2025-01-28 RX ADMIN — HYDROMORPHONE HYDROCHLORIDE 0.5 MILLIGRAM(S): 4 INJECTION, SOLUTION INTRAMUSCULAR; INTRAVENOUS; SUBCUTANEOUS at 05:46

## 2025-01-28 RX ADMIN — CASPOFUNGIN ACETATE 260 MILLIGRAM(S): 5 INJECTION, POWDER, LYOPHILIZED, FOR SOLUTION INTRAVENOUS at 10:34

## 2025-01-28 RX ADMIN — MEROPENEM 1000 MILLIGRAM(S): 500 INJECTION INTRAVENOUS at 21:27

## 2025-01-28 RX ADMIN — Medication 5 MILLIGRAM(S): at 23:58

## 2025-01-28 RX ADMIN — OXYCODONE HYDROCHLORIDE 5 MILLIGRAM(S): 30 TABLET ORAL at 17:38

## 2025-01-28 RX ADMIN — HYDROMORPHONE HYDROCHLORIDE 0.5 MILLIGRAM(S): 4 INJECTION, SOLUTION INTRAMUSCULAR; INTRAVENOUS; SUBCUTANEOUS at 12:33

## 2025-01-28 RX ADMIN — PANTOPRAZOLE 40 MILLIGRAM(S): 20 TABLET, DELAYED RELEASE ORAL at 18:30

## 2025-01-28 NOTE — BRIEF OPERATIVE NOTE - OPERATION/FINDINGS
See dictation right leg I&D of medial thigh abscess (was bedside I&D prior and there was no residual purulence within cavity) cavity copiously irrigated it tracked medially in subcutaneous space no connection to vessels  the superior wound was debrided the fascia was completely intact and healed intraoperative ultrasound did NOT show fluid around vessels   wound closed over [place] drain provena vac placed

## 2025-01-28 NOTE — PROGRESS NOTE ADULT - ASSESSMENT
Pt is a 74y old Female with hx of DM, TAVR 8/2024 on Plavix , A-fib on Xarelto, CHF, HLD, PVD, b/l LE venous insufficiency, bilateral CFA stenoses s/p Rt SFA endarterectomy on 12/17/24 complicated by postoperative seroma requiring irrigation and debridement and wound VAC placement Seen on January 18 for dislodgment of wound VAC tubing presents for evaluation of fever/chills and rigors since last night. Patient reports she had felt like her usual self since her last discharge until today. She had acute onset subjective fevers, chills, rigors d/t which she came to ED. In ED patient febrile with Tmax of 101.9, Soft BP in 90s/40-50s, HR in 70s. CBC noted for WBC 21, H/H 5.8/19. Palliative medicine consulted to help establish GOC and advance care planing     1) Sepsis   - possible UTI   - right inguinal infection- wound ( s/p debridement on 1/8 with wound vac and replaced on 1/11)   - vascular notes appreciated   - + bacteremia   - c/w vanco/radha and caspofungin   - ID consult appreciated  - would consult appreciated    2) Anemia   - r/o GIB   - + occult blood   - GI consult appreciated   - monitor labs   - transfuse PRN      3) Heart failure and Afib   - c/w her Lasix  - hold her metoprolol and aldactone at this time   - Monitor weights, strict ins and outs  - cardiology consult appreciated     Process of Care  --Reviewed dx/treatment problems and alignment with Goals of Care    Physical Aspects of Care  --Pain  patient denies at this time  c/w oxycodone 5mg every 6 hours PRN for moderate pain   c/w oxycodone 10mg every 6 hours PRN for severe pain     --Dyspnea  No SOB at this time  comfortable and in NAD    --Nausea Vomiting  denies    --Weakness  PT as tolerated     Psychological and Psychiatric Aspects of Care:   --Greif/Bereavment: emotional support provided  -Pastoral Care Available PRN     Social Aspects of Care  -SW involved     Cultural Aspects  -Primary Language: English    Goals of Care:     We discussed Palliative Care team being a supportive team when a patient has ongoing illnesses.  We also discussed that it is not an end of life care service, but can help navigate symptoms and emotional support througout their hospital stay here.    Ethical and Legal Aspects: NA  Capacity- pt appears to have capacity   HCP/Surrogate:  Manny alternate son Jony  Code Status- full code   MOLST-   Dispo Plan-    Discussed With: Dr. Cole     Case coordinated with attending and SW and RN     Time Spent: 40 minutes including the care, coordination and counseling of this patient, 50% of which was spent coordinating and counseling.

## 2025-01-28 NOTE — PROGRESS NOTE ADULT - SUBJECTIVE AND OBJECTIVE BOX
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 Patient reports she had felt like her usual self since her last discharge until today. She had acute onset subjective fevers, chills, rigors d/t which she came to ED. She was discharged from  on 1/18 with a course of ciprofloxacin which she reports being compliant with, reports having "5 pills" left in the course. She denies any N/V, cough, sore throat, rhinitis, chest pain, rash, diarrhea or dysuria.   In ED patient febrile with Tmax of 101.9, Soft BP in 90s/40-50s, HR in 70s. CBC noted for WBC 21, H/H 5.8/19, Plt 441. CMP noted for BUN/Cr 28/1.04. Lactate 2.5 > 3.7. F/u/COVID negative. UA cloudy, with trace ketones, mod leuks, pos nitrites, 158 wbcs. Guaiac positive. CTA of abdomen and b/l LE negative for acute hemorrhage. CXR read as "grossly clear lungs". Patient met sepsis criteria, cultures obtained and patient recieved broad spectrum antibiotics with vancomycin and ceftriaxone in ED. Patient also recieved 2L NS and ordered for 2 U pRBCs. Patient admitted to  for sepsis and symptomatic anemia.       Subjective: Patient seen and examined.  Patient reports that her lower extremity wound pain is worse today compared to yesterday.  Patient in mild distress due to the pain.  Vascular surgery attempted wound VAC reapplication today however patient refused.   at bedside.  Patient denies any chest pain shortness of breath nausea vomiting diarrhea.  Continue IV antibiotics and antifungal.  WBC is improved to 9.97.  Hemoglobin stable at 8.8.     Additional results/Imaging, I have personally reviewed:    LABS:All other systems reviewed and found to be negative with the exception of what has been described above.PHYSICAL EXAM:  Constitutional: NAD, awake and alert, obese, chronically ill appearing   HEENT: PERRLA, EOMI, MMM  Respiratory: Breath sounds are clear bilaterally, No wheezing, rales or rhonchi  Cardiovascular: S1 and S2, RRR, no murmurs, gallops or rubs  Gastrointestinal: +BS, soft, non-tender, non-distended, no CVA tenderness  Extremities: No peripheral edema, +DP pulses b/l  Neurological: A&O x 3, no focal deficits  Musculoskeletal: 5/5 strength b/l upper and lower extremities  Skin: Chronic LE venous stasis changes, +R groin wound foul smelling, skin warm and drylabs reviewed

## 2025-01-28 NOTE — PROGRESS NOTE ADULT - SUBJECTIVE AND OBJECTIVE BOX
HPI: pt seen and examined with no family at bedside, [facility] meeting held with patient today will continue to follow please see note     PAIN: (x )Yes   ( )No  generalized pain all over     DYSPNEA: ( ) Yes  ( ) No  Level:    Review of Systems:    Physical Discomfort- generalized pain but unable to further describe   Fatigue- yes  Weakness- yes    All other systems reviewed and negative    PHYSICAL EXAM:    Vital Signs Last 24 Hrs  T(C): 37.2 (2025 08:38), Max: 37.2 (2025 08:38)  T(F): 98.9 (2025 08:38), Max: 98.9 (2025 08:38)  HR: 54 (2025 08:38) (54 - 55)  BP: 132/49 (2025 08:38) (102/44 - 132/49)  RR: 18 (2025 08:38) (18 - 18)  SpO2: 95% (2025 08:38) (95% - 100%)    Parameters below as of 2025 08:38  Patient On (Oxygen Delivery Method): room air    Daily Weight in k.7 (2025 06:34)    PPSV2: 60  %    General: pleasant female in bed, NAD  Mental Status: alert and oriented   HEENT: nasal cannula in place   Lungs: diminished breath sounds b/l   Cardiac: s1s2 +   GI: nontender, nondistended, +BS   : +voiding   Ext: edema +, discoloration +CAPPS   Neuro: weakness, non focal       LABS:                        8.8    5.90  )-----------( 282      ( 2025 05:27 )             29.2         136  |  99  |  11  ----------------------------<  100[H]  3.6   |  33[H]  |  0.63    Ca    8.6      2025 05:27  Phos  3.4       Mg     1.6           PT/INR - ( 2025 05:27 )   PT: 14.1 sec;   INR: 1.23 ratio         PTT - ( 2025 07:57 )  PTT:39.8 sec  Albumin: Albumin: 2.2 g/dL ( @ 07:04)      Allergies    pregabalin (Unknown)  latex (Unknown)  penicillin (Hives; Urticaria)  PC Pen VK (Rash)    Intolerances      MEDICATIONS  (STANDING):  aMIOdarone    Tablet 200 milliGRAM(s) Oral daily  atorvastatin 10 milliGRAM(s) Oral at bedtime  caspofungin IVPB 50 milliGRAM(s) IV Intermittent every 24 hours  caspofungin IVPB      furosemide    Tablet 40 milliGRAM(s) Oral daily  heparin  Infusion. 2000 Unit(s)/Hr (20 mL/Hr) IV Continuous <Continuous>  influenza  Vaccine (HIGH DOSE) 0.5 milliLiter(s) IntraMuscular once  lactated ringers. 1000 milliLiter(s) (75 mL/Hr) IV Continuous <Continuous>  meropenem Injectable 1000 milliGRAM(s) IV Push every 8 hours  montelukast 10 milliGRAM(s) Oral at bedtime  multivitamin/minerals 1 Tablet(s) Oral daily  naloxone Injectable 0.4 milliGRAM(s) IV Push once  pantoprazole  Injectable 40 milliGRAM(s) IV Push every 12 hours  polyethylene glycol 3350 17 Gram(s) Oral daily  potassium chloride   Powder 40 milliEquivalent(s) Oral once  senna 2 Tablet(s) Oral at bedtime  sodium chloride 0.9%. 1000 milliLiter(s) (75 mL/Hr) IV Continuous <Continuous>  thiamine 100 milliGRAM(s) Oral daily    MEDICATIONS  (PRN):  acetaminophen     Tablet .. 650 milliGRAM(s) Oral every 6 hours PRN Temp greater or equal to 38C (100.4F)  aluminum hydroxide/magnesium hydroxide/simethicone Suspension 30 milliLiter(s) Oral every 4 hours PRN Dyspepsia  artificial  tears Solution 1 Drop(s) Both EYES every 2 hours PRN Dry Eyes  bisacodyl 5 milliGRAM(s) Oral daily PRN Constipation  fentaNYL    Injectable 50 MICROGram(s) IV Push every 5 minutes PRN Severe Pain (7 - 10)  heparin   Injectable 8500 Unit(s) IV Push every 6 hours PRN For aPTT less than 40  heparin   Injectable 4000 Unit(s) IV Push every 6 hours PRN For aPTT between 40 - 57  HYDROmorphone  Injectable 0.5 milliGRAM(s) IV Push every 4 hours PRN Severe Pain (7 - 10)  LORazepam     Tablet 0.5 milliGRAM(s) Oral two times a day PRN Anxiety  melatonin 3 milliGRAM(s) Oral at bedtime PRN Insomnia  ondansetron Injectable 4 milliGRAM(s) IV Push every 6 hours PRN Nausea and/or Vomiting  ondansetron Injectable 4 milliGRAM(s) IV Push once PRN Nausea and/or Vomiting  oxyCODONE    IR 5 milliGRAM(s) Oral every 6 hours PRN Moderate Pain (4 - 6)  zolpidem 5 milliGRAM(s) Oral at bedtime PRN Insomnia      RADIOLOGY:

## 2025-01-28 NOTE — PROGRESS NOTE ADULT - SUBJECTIVE AND OBJECTIVE BOX
SURGERY DAILY PROGRESS NOTE:     Subjective:  Patient seen and examined at bedside during am rounds.   I&D of Right thigh abscess done at bedside    Objective:    MEDICATIONS  (STANDING):  aMIOdarone    Tablet 200 milliGRAM(s) Oral daily  atorvastatin 10 milliGRAM(s) Oral at bedtime  caspofungin IVPB      caspofungin IVPB 50 milliGRAM(s) IV Intermittent every 24 hours  furosemide    Tablet 40 milliGRAM(s) Oral daily  heparin  Infusion.  Unit(s)/Hr (18 mL/Hr) IV Continuous <Continuous>  influenza  Vaccine (HIGH DOSE) 0.5 milliLiter(s) IntraMuscular once  meropenem Injectable 1000 milliGRAM(s) IV Push every 8 hours  montelukast 10 milliGRAM(s) Oral at bedtime  multivitamin/minerals 1 Tablet(s) Oral daily  naloxone Injectable 0.4 milliGRAM(s) IV Push once  pantoprazole  Injectable 40 milliGRAM(s) IV Push every 12 hours  polyethylene glycol 3350 17 Gram(s) Oral daily  senna 2 Tablet(s) Oral at bedtime  thiamine 100 milliGRAM(s) Oral daily    MEDICATIONS  (PRN):  acetaminophen     Tablet .. 650 milliGRAM(s) Oral every 6 hours PRN Temp greater or equal to 38C (100.4F)  aluminum hydroxide/magnesium hydroxide/simethicone Suspension 30 milliLiter(s) Oral every 4 hours PRN Dyspepsia  bisacodyl 5 milliGRAM(s) Oral daily PRN Constipation  heparin   Injectable 8500 Unit(s) IV Push every 6 hours PRN For aPTT less than 40  heparin   Injectable 4000 Unit(s) IV Push every 6 hours PRN For aPTT between 40 - 57  HYDROmorphone  Injectable 0.5 milliGRAM(s) IV Push every 4 hours PRN Severe Pain (7 - 10)  LORazepam     Tablet 0.5 milliGRAM(s) Oral two times a day PRN Anxiety  melatonin 3 milliGRAM(s) Oral at bedtime PRN Insomnia  ondansetron Injectable 4 milliGRAM(s) IV Push every 6 hours PRN Nausea and/or Vomiting  oxyCODONE    IR 5 milliGRAM(s) Oral every 6 hours PRN Moderate Pain (4 - 6)  zolpidem 5 milliGRAM(s) Oral at bedtime PRN Insomnia      Vital Signs Last 24 Hrs  T(C): 36.9 (2025 23:54), Max: 37.1 (2025 16:38)  T(F): 98.4 (2025 23:54), Max: 98.8 (2025 16:38)  HR: 59 (2025 23:54) (53 - 59)  BP: 131/56 (2025 23:54) (127/46 - 139/61)  BP(mean): --  RR: 18 (2025 23:54) (18 - 18)  SpO2: 98% (2025 23:54) (92% - 98%)    Parameters below as of 2025 23:54  Patient On (Oxygen Delivery Method): room air          PHYSICAL EXAM   Gen: well-appearing, in no acute distress  CV: pulse regularly present   Resp: airway patent, non-labored breathing  Abd: soft, NTND; no rebound or guarding. R groin wound, dressing/packing c/d/i  Ext. no cyanosis or edema      I&O's Detail      Daily     Daily Weight in k (2025 06:22)    LABS:                        8.9    10.05 )-----------( 306      ( 2025 06:51 )             29.2     01-    135  |  101  |  9   ----------------------------<  98  3.7   |  29  |  0.55    Ca    8.6      2025 07:27      PTT - ( 2025 06:51 )  PTT:62.9 sec  Urinalysis Basic - ( 2025 07:27 )    Color: x / Appearance: x / SG: x / pH: x  Gluc: 98 mg/dL / Ketone: x  / Bili: x / Urobili: x   Blood: x / Protein: x / Nitrite: x   Leuk Esterase: x / RBC: x / WBC x   Sq Epi: x / Non Sq Epi: x / Bacteria: x

## 2025-01-28 NOTE — PROGRESS NOTE ADULT - ASSESSMENT
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 Patient reports she had felt like her usual self since her last discharge until today. She had acute onset subjective fevers, chills, rigors d/t which she came to ED. In ED patient febrile with Tmax of 101.9, Soft BP in 90s/40-50s, HR in 70s. CBC noted for WBC 21, H/H 5.8/19. Admitted for:    #Sepsis 2/2 ESBL E. Coli bacteremia , ESBL E. Coli UTI#Right inguinal postsurgical bacterial and fungal infection s/p right SFA endarterectomy s/p collection with ENFA and CAGL  - Consulted ICU in ED, patient deemed stable for tele floor  - Tmax 101.9F, WBC 21 -> 14, lactate 3.7 -> 1.6  - RVP negative, CXR clear   - CT RLE as above  - ID consulted , added caspofungin for recent wound cx   - vanco stopped per ID  - Started IV meropenem 100mg q8h and caspofungin 50 mg IV QD on 1/23 (Day 4)   - BCx and UCx 1/22 both growing ESBL E. Coli, etiology of bacteremia likely from UTI  - Cardiology consulted, feel less likely presentation c/f endocarditis, holding off on ASHLEY at this time  - Vascular surgery following Dr. Manjarrez   - Wound vac d/c'd per vascular, continue wet to dry dressings daily- restart wound vac when available   -Patient declined wound vac placement today  - tentative plan for OR on Tuesday for R. groin wound wash out, holding xarelto/plavix at this time, c/w hep gtt in anticipation of procedure  - PRN oxycodone for pain controlWill escalate pain management with IV dilaudid 0.5mg q5 for severe pain     #Acute blood loss anemia 2/2 GIB   - Patient on Xarelto for Afib, with guaiac positive test in ED  - CTA abd/pelvis negative for acute bleed  - C/w protonix BID  - Hgb 5.8 4 units totol pRBC- 8.8 today and stable   - Placed on heparin gtt to determine if can tolerate AC (xarelto/plavix remain held)   - Hold her metoprolol, cardizem, and aldactone d/t soft BP, resume when appropriate   - GI consulted, no current plan for scope at this time   - Continue to trend H/H    #Chronic heart failure with preserved ejection fraction (HFpEF)  #AS s/p TAVR  -Last ECHO done on 12/05/2024: LVEF 56%, LV cavity moderately dilated, normal wall thickness, normal LVEF, severe (grade 3) LV diastolic dysfunction, severe LA dilatation, mod to moderate RA dilatation, normal RV size and function.   -C/w her lasix, hold her metoprolol and aldactone d/t ABLA and soft BP     -Monitor weights, strict ins and outs.#paroxsymal Atrial fibrillation   #Elevated troponin   - In sinus rhythm here.   - C/w her amiodarone.   - Holding Cardizem, metoprolol and xarelto d/t soft BP and ABLA, Resume when appropriate  - Troponin trend as above  - Cardiology following Dr Sivan Gasca     #DM2 (diabetes mellitus)    - Last A1c 5.8 (4/2024)  - Not on any medications.   - Carb restricted diet when appropriate.#HLD (hyperlipidemia)   - on home statin and Zetia  - C/w her statin.    # Insomnia.  - Ambien PRN    #Anxiety   - trial PO ativan 0.5mg PRN for anxiety   #DVT ppx   - SCDs. heparin gtt  Holding Xarelto as above in anticipation of procedure     dispo tele sinus ankush 50-60 no events , not in acute heart failure, medically no acute contraindications for OR by vascular for wash out r angie i nadege gasca  total time spent 55mins

## 2025-01-28 NOTE — PROGRESS NOTE ADULT - ASSESSMENT
74-year-old F, with PMHx of DM, TAVR 8/2024 on Plavix , A-fib on Xarelto, CHF, HLD, PVD, b/l LE venous insufficiency, bilateral CFA stenoses s/p Rt SFA endarterectomy on 12/17/24 with R groin surgical site infection.     PLAN  -OR on 1/29 for washout, with flap reconstruction of Right thigh   - Stop hep drip @ 7 am  - NPO  - preop labs ordered  - Medicine: no acute contraindications for OR  - Cards: deferred ASHLEY for now; appreciate preop clx 74-year-old F, with PMHx of DM, TAVR 8/2024 on Plavix , A-fib on Xarelto, CHF, HLD, PVD, b/l LE venous insufficiency, bilateral CFA stenoses s/p Rt SFA endarterectomy on 12/17/24 with R groin surgical site infection.     PLAN  -OR on 1/28 for washout, with flap reconstruction of Right thigh   - Stop hep drip @ 7 am  - NPO  - preop labs ordered  - Medicine: no acute contraindications for OR  - Cards: deferred ASHLEY for now; appreciate preop clx

## 2025-01-28 NOTE — PROGRESS NOTE ADULT - ASSESSMENT
73 yo F with PMHx as above presents c/o fevers and chills found to have sepsis 2/2 e. coli bacteremia and non-healing right groin wound.    -Plan for washout, with flap reconstruction of Right thigh possibly today vs tomorrow. Patient is an elevated cardiac risk mostly due to her age and deconditioning for an intermediate risk procedure. She has no sign of recent MI or any decompensated heart failure. No concerns at this time for endocarditis. She has a normal LVSF on recent echocardiogram without any severe valvular disease  -she is therefore stable/optimized from a cardiac standpoint for procedure as planned and no further cardiac work up or treatment is needed prior.  -Continue to tx sepsis as per primary team and ID.

## 2025-01-28 NOTE — PROGRESS NOTE ADULT - SUBJECTIVE AND OBJECTIVE BOX
CHIEF COMPLAINT:  Patient is a 74y old  Female who presents with a chief complaint of fever/chills    HPI:  74-year-old female with past medical history of DM, TAVR 2024 on Plavix , A-fib on Xarelto, CHF, HLD, PVD, b/l LE venous insufficiency, bilateral CFA stenoses s/p Rt SFA endarterectomy on 24 complicated by postoperative seroma requiring irrigation and debridement and wound VAC placement Seen on  for dislodgment of wound VAC tubing presents for evaluation of fever/chills and rigors since last night. Patient reports she had felt like her usual self since her last discharge until today. She had acute onset subjective fevers, chills, rigors d/t which she came to ED. She was discharged from  on  with a course of ciprofloxacin which she reports being compliant with, reports having "5 pills" left in the course. She denies any N/V, cough, sore throat, rhinitis, chest pain, rash, diarrhea or dysuria.   In ED patient febrile with Tmax of 101.9, Soft BP in 90s/40-50s, HR in 70s. CBC noted for WBC 21, H/H 5.8/19, Plt 441. CMP noted for BUN/Cr 28/1.04. Lactate 2.5 > 3.7. F/u/COVID negative. UA cloudy, with trace ketones, mod leuks, pos nitrites, 158 wbcs. Guaiac positive. CTA of abdomen and b/l LE negative for acute hemorrhage. CXR read as "grossly clear lungs". Patient met sepsis criteria, cultures obtained and patient recieved broad spectrum antibiotics with vancomycin and ceftriaxone in ED. Patient also recieved 2L NS and ordered for 2 U pRBCs. Patient admitted to  for sepsis and symptomatic anemia.    25: No acute events O/N. She is very concerned about having any procedures, even the though of a PICC line and needing IV antibiotics worries her.  25: No acute events since patient was last seen but plans for washout, with flap reconstruction of Right thigh. She denies any SOB, orthopnea, PND, CP.        PMHx:  PAST MEDICAL & SURGICAL HISTORY:  Diabetes mellitus type II, controlled  Atrial fibrillation  Congestive heart failure  Dyspnea  Morbid obesity s/p lap band  Neuropathy  Peripheral venous insufficiency  Thyroid nodule  HTN (hypertension)  S/P left knee arthroscopy  Status post medial meniscus repair  History of cholecystectomy  S/P cataract surgery    FAMILY HISTORY:   FAMILY HISTORY:  Family history of breast cancer in mother  Family history of diabetes mellitus in mother  FHx: heart disease (Sibling)    ALLERGIES:  Allergies  pregabalin (Unknown)  latex (Unknown)  penicillin (Hives; Urticaria)  PC Pen VK (Rash)    REVIEW OF SYSTEMS:  10 system ROS was obtained, all pertinent positives and negatives are in HPI otherwise they were negative.    Vital Signs Last 24 Hrs  T(C): 36.7 (2025 01:24), Max: 36.7 (2025 09:04)  T(F): 98.1 (:24), Max: 98.1 (2025 09:04)  HR: 55 (:24) (51 - 55)  BP: 111/56 (:24) (102/44 - 137/55)  BP(mean): --  RR: 18 (:) (18 - 18)  SpO2: 100% (:24) (98% - 100%)    Parameters below as of 2025 01:24  Patient On (Oxygen Delivery Method): room air      I&O's Summary  2025 07:01  -  2025 07:00  --------------------------------------------------------  IN: 0 mL / OUT: 350 mL / NET: -350 mL    PHYSICAL EXAM:   Constitutional: awake and alert in NAD  HEENT: EOMI, Normal Hearing, MMM  Neck: Soft and supple, No LAD, No JVD, no carotid bruit  Respiratory: Breath sounds are clear bilaterally, No wheezing, rales or rhonchi, good air movement  Cardiovascular: RRR soft systolic murmur at apex  Gastrointestinal: Bowel Sounds present, soft, nontender, nondistended, no guarding, no rebound  Extremities: Warm and well perfused, no peripheral edema  Neurological: A/O x 3, no focal deficits appreciated  Skin: No rashes appreciated    MEDICATIONS  (STANDING):  aMIOdarone    Tablet 200 milliGRAM(s) Oral daily  atorvastatin 10 milliGRAM(s) Oral at bedtime  caspofungin IVPB 50 milliGRAM(s) IV Intermittent every 24 hours  caspofungin IVPB      furosemide    Tablet 40 milliGRAM(s) Oral daily  heparin  Infusion. 2000 Unit(s)/Hr (20 mL/Hr) IV Continuous <Continuous>  influenza  Vaccine (HIGH DOSE) 0.5 milliLiter(s) IntraMuscular once  meropenem Injectable 1000 milliGRAM(s) IV Push every 8 hours  montelukast 10 milliGRAM(s) Oral at bedtime  multivitamin/minerals 1 Tablet(s) Oral daily  naloxone Injectable 0.4 milliGRAM(s) IV Push once  pantoprazole  Injectable 40 milliGRAM(s) IV Push every 12 hours  polyethylene glycol 3350 17 Gram(s) Oral daily  potassium chloride   Powder 40 milliEquivalent(s) Oral once  senna 2 Tablet(s) Oral at bedtime  sodium chloride 0.9%. 1000 milliLiter(s) (75 mL/Hr) IV Continuous <Continuous>  thiamine 100 milliGRAM(s) Oral daily    MEDICATIONS  (PRN):  acetaminophen     Tablet .. 650 milliGRAM(s) Oral every 6 hours PRN Temp greater or equal to 38C (100.4F)  aluminum hydroxide/magnesium hydroxide/simethicone Suspension 30 milliLiter(s) Oral every 4 hours PRN Dyspepsia  artificial  tears Solution 1 Drop(s) Both EYES every 2 hours PRN Dry Eyes  bisacodyl 5 milliGRAM(s) Oral daily PRN Constipation  heparin   Injectable 8500 Unit(s) IV Push every 6 hours PRN For aPTT less than 40  heparin   Injectable 4000 Unit(s) IV Push every 6 hours PRN For aPTT between 40 - 57  HYDROmorphone  Injectable 0.5 milliGRAM(s) IV Push every 4 hours PRN Severe Pain (7 - 10)  LORazepam     Tablet 0.5 milliGRAM(s) Oral two times a day PRN Anxiety  melatonin 3 milliGRAM(s) Oral at bedtime PRN Insomnia  ondansetron Injectable 4 milliGRAM(s) IV Push every 6 hours PRN Nausea and/or Vomiting  oxyCODONE    IR 5 milliGRAM(s) Oral every 6 hours PRN Moderate Pain (4 - 6)  zolpidem 5 milliGRAM(s) Oral at bedtime PRN Insomnia      LABS: All Labs Reviewed:                         8.8    5.90  )-----------( 282      ( 2025 05:27 )             29.2                        7.2    14.07 )-----------( 242      ( 2025 07:04 )             24.2         136  |  99  |  11  ----------------------------<  100[H]  3.6   |  33[H]  |  0.63    Ca    8.6      2025 05:27  Phos  3.4     -28  Mg     1.6         135  |  104  |  19  ----------------------------<  95  3.2[L]   |  26  |  0.82    Ca    8.2[L]      2025 07:04  Phos  4.3       Mg     1.7         TPro  6.0  /  Alb  2.2[L]  /  TBili  0.6  /  DBili  x   /  AST  20  /  ALT  9[L]  /  AlkPhos  54      PT/INR - ( 2025 11:22 )   PT: 25.8 sec;   INR: 2.26 ratio    PTT - ( 2025 11:22 )  PTT:33.6 sec    BLOOD CULTURES: Organism Blood Culture PCR  Gram Stain Blood -- Gram Stain   Growth in aerobic bottle: Gram Negative Rods  Growth in anaerobic bottle: Gram Negative Rods  Specimen Source .Blood BLOOD  Culture-Blood --    RADIOLOGY:   Reviewed in EMR     EK/22/25, my interpretation: NSR LAD IRBBB PRWP    TELEMETRY:    Reviewed, no significant events appreciated, remains in sinus    ECHO:    CONCLUSIONS:      1. Left ventricular cavity is moderately dilated. Left ventricular systolic function is normal with an ejection fraction of 58 % by Ramirez's method of disks with an ejection fraction visually estimated at 55 to 60 %.   2. Compared to the transthoracic echocardiogram performed on 2024, there have been no significant interval changes.    ________________________________________________________________________________________  FINDINGS:     Left Ventricle:  The left ventricular cavity is moderately dilated. Left ventricular wall thickness is normal. Left ventricular systolic function is normal with an ejection fraction visually estimated at 55 to 60%.     Left Atrium:  The left atrium is moderately dilated with an indexed volume of 47.11 ml/m².     Aortic Valve:  A a transcatheter deployed (TAVR) is present in the aortic position.     Mitral Valve:  There is mitral valve thickening of the anterior and posterior leaflets. There is moderate calcification of the mitral valve annulus. Cannot rule out endocarditis due to focal MAC that appears mobile in some views. There is mild mitral regurgitation.     Tricuspid Valve:  Structurally normal tricuspid valve with normal leaflet excursion. Thereis mild to moderate tricuspid regurgitation. Estimated pulmonary artery systolic pressure is 54 mmHg, consistent with moderate pulmonary hypertension.     Pulmonic Valve:  Structurally normal pulmonic valve with normal leaflet excursion.     SystemicVeins:  The inferior vena cava is normal in size measuring 1.63 cm in diameter, (normal <2.1cm) with normal inspiratory collapse (normal >50%) consistent with normal right atrial pressure (~3, range 0-5mmHg).    NOTE: My interpretation is that her MAC appears moderate and stable without characteristics of mobility

## 2025-01-28 NOTE — PROGRESS NOTE ADULT - CONVERSATION DETAILS
I met with the patient at the bedside and reviewed the role of palliative medicine. The patient stated she was going to the OR today and was waiting. We discussed the patient's advance directives, and she said she wasn't sure at this time;  she would like to pursue all interventions. The patient became tearful talking about this, and I reassured her that we have these conversations with everyone as part of our routine care. I will follow up with the patient tomorrow after she has a procedure. Emotional Support provided

## 2025-01-29 ENCOUNTER — APPOINTMENT (OUTPATIENT)
Dept: VASCULAR SURGERY | Facility: CLINIC | Age: 75
End: 2025-01-29

## 2025-01-29 LAB
ANION GAP SERPL CALC-SCNC: 3 MMOL/L — LOW (ref 5–17)
APTT BLD: 85.2 SEC — HIGH (ref 24.5–35.6)
BASOPHILS # BLD AUTO: 0.01 K/UL — SIGNIFICANT CHANGE UP (ref 0–0.2)
BASOPHILS NFR BLD AUTO: 0.2 % — SIGNIFICANT CHANGE UP (ref 0–2)
BUN SERPL-MCNC: 12 MG/DL — SIGNIFICANT CHANGE UP (ref 7–23)
CALCIUM SERPL-MCNC: 8.8 MG/DL — SIGNIFICANT CHANGE UP (ref 8.5–10.1)
CHLORIDE SERPL-SCNC: 99 MMOL/L — SIGNIFICANT CHANGE UP (ref 96–108)
CO2 SERPL-SCNC: 32 MMOL/L — HIGH (ref 22–31)
CREAT SERPL-MCNC: 0.65 MG/DL — SIGNIFICANT CHANGE UP (ref 0.5–1.3)
EGFR: 92 ML/MIN/1.73M2 — SIGNIFICANT CHANGE UP
EOSINOPHIL # BLD AUTO: 0 K/UL — SIGNIFICANT CHANGE UP (ref 0–0.5)
EOSINOPHIL NFR BLD AUTO: 0 % — SIGNIFICANT CHANGE UP (ref 0–6)
GLUCOSE SERPL-MCNC: 128 MG/DL — HIGH (ref 70–99)
HCT VFR BLD CALC: 30.8 % — LOW (ref 34.5–45)
HCT VFR BLD CALC: 31 % — LOW (ref 34.5–45)
HGB BLD-MCNC: 9.2 G/DL — LOW (ref 11.5–15.5)
HGB BLD-MCNC: 9.3 G/DL — LOW (ref 11.5–15.5)
IMM GRANULOCYTES NFR BLD AUTO: 0.8 % — SIGNIFICANT CHANGE UP (ref 0–0.9)
LYMPHOCYTES # BLD AUTO: 0.41 K/UL — LOW (ref 1–3.3)
LYMPHOCYTES # BLD AUTO: 6.8 % — LOW (ref 13–44)
MCHC RBC-ENTMCNC: 26 PG — LOW (ref 27–34)
MCHC RBC-ENTMCNC: 26.1 PG — LOW (ref 27–34)
MCHC RBC-ENTMCNC: 29.9 G/DL — LOW (ref 32–36)
MCHC RBC-ENTMCNC: 30 G/DL — LOW (ref 32–36)
MCV RBC AUTO: 86.6 FL — SIGNIFICANT CHANGE UP (ref 80–100)
MCV RBC AUTO: 87.3 FL — SIGNIFICANT CHANGE UP (ref 80–100)
MONOCYTES # BLD AUTO: 0.32 K/UL — SIGNIFICANT CHANGE UP (ref 0–0.9)
MONOCYTES NFR BLD AUTO: 5.3 % — SIGNIFICANT CHANGE UP (ref 2–14)
NEUTROPHILS # BLD AUTO: 5.21 K/UL — SIGNIFICANT CHANGE UP (ref 1.8–7.4)
NEUTROPHILS NFR BLD AUTO: 86.9 % — HIGH (ref 43–77)
PLATELET # BLD AUTO: 331 K/UL — SIGNIFICANT CHANGE UP (ref 150–400)
PLATELET # BLD AUTO: 345 K/UL — SIGNIFICANT CHANGE UP (ref 150–400)
POTASSIUM SERPL-MCNC: 3.7 MMOL/L — SIGNIFICANT CHANGE UP (ref 3.5–5.3)
POTASSIUM SERPL-SCNC: 3.7 MMOL/L — SIGNIFICANT CHANGE UP (ref 3.5–5.3)
RBC # BLD: 3.53 M/UL — LOW (ref 3.8–5.2)
RBC # BLD: 3.58 M/UL — LOW (ref 3.8–5.2)
RBC # FLD: 16.5 % — HIGH (ref 10.3–14.5)
RBC # FLD: 17 % — HIGH (ref 10.3–14.5)
SODIUM SERPL-SCNC: 134 MMOL/L — LOW (ref 135–145)
WBC # BLD: 6 K/UL — SIGNIFICANT CHANGE UP (ref 3.8–10.5)
WBC # BLD: 6.57 K/UL — SIGNIFICANT CHANGE UP (ref 3.8–10.5)
WBC # FLD AUTO: 6 K/UL — SIGNIFICANT CHANGE UP (ref 3.8–10.5)
WBC # FLD AUTO: 6.57 K/UL — SIGNIFICANT CHANGE UP (ref 3.8–10.5)

## 2025-01-29 PROCEDURE — 99497 ADVNCD CARE PLAN 30 MIN: CPT

## 2025-01-29 PROCEDURE — 99233 SBSQ HOSP IP/OBS HIGH 50: CPT

## 2025-01-29 PROCEDURE — 99232 SBSQ HOSP IP/OBS MODERATE 35: CPT

## 2025-01-29 RX ORDER — HEPARIN SODIUM,PORCINE 10000/ML
8500 VIAL (ML) INJECTION ONCE
Refills: 0 | Status: COMPLETED | OUTPATIENT
Start: 2025-01-29 | End: 2025-01-29

## 2025-01-29 RX ORDER — HEPARIN SODIUM,PORCINE 10000/ML
4000 VIAL (ML) INJECTION EVERY 6 HOURS
Refills: 0 | Status: DISCONTINUED | OUTPATIENT
Start: 2025-01-29 | End: 2025-01-30

## 2025-01-29 RX ORDER — HEPARIN SODIUM,PORCINE 10000/ML
2000 VIAL (ML) INJECTION
Qty: 25000 | Refills: 0 | Status: DISCONTINUED | OUTPATIENT
Start: 2025-01-29 | End: 2025-01-30

## 2025-01-29 RX ORDER — HEPARIN SODIUM,PORCINE 10000/ML
8500 VIAL (ML) INJECTION EVERY 6 HOURS
Refills: 0 | Status: DISCONTINUED | OUTPATIENT
Start: 2025-01-29 | End: 2025-01-30

## 2025-01-29 RX ADMIN — PANTOPRAZOLE 40 MILLIGRAM(S): 20 TABLET, DELAYED RELEASE ORAL at 05:55

## 2025-01-29 RX ADMIN — Medication 8500 UNIT(S): at 14:13

## 2025-01-29 RX ADMIN — HYDROMORPHONE HYDROCHLORIDE 0.5 MILLIGRAM(S): 4 INJECTION, SOLUTION INTRAMUSCULAR; INTRAVENOUS; SUBCUTANEOUS at 17:51

## 2025-01-29 RX ADMIN — Medication 5000 UNIT(S): at 05:55

## 2025-01-29 RX ADMIN — Medication 2000 UNIT(S)/HR: at 19:08

## 2025-01-29 RX ADMIN — CASPOFUNGIN ACETATE 260 MILLIGRAM(S): 5 INJECTION, POWDER, LYOPHILIZED, FOR SOLUTION INTRAVENOUS at 09:51

## 2025-01-29 RX ADMIN — Medication 1 TABLET(S): at 09:47

## 2025-01-29 RX ADMIN — ZOLPIDEM TARTRATE 5 MILLIGRAM(S): 5 TABLET, COATED ORAL at 22:01

## 2025-01-29 RX ADMIN — PANTOPRAZOLE 40 MILLIGRAM(S): 20 TABLET, DELAYED RELEASE ORAL at 17:54

## 2025-01-29 RX ADMIN — Medication 2000 UNIT(S)/HR: at 13:56

## 2025-01-29 RX ADMIN — HYDROMORPHONE HYDROCHLORIDE 0.5 MILLIGRAM(S): 4 INJECTION, SOLUTION INTRAMUSCULAR; INTRAVENOUS; SUBCUTANEOUS at 05:55

## 2025-01-29 RX ADMIN — MONTELUKAST SODIUM 10 MILLIGRAM(S): 5 TABLET, CHEWABLE ORAL at 22:01

## 2025-01-29 RX ADMIN — ATORVASTATIN CALCIUM 10 MILLIGRAM(S): 80 TABLET, FILM COATED ORAL at 22:01

## 2025-01-29 RX ADMIN — Medication 2000 UNIT(S)/HR: at 22:31

## 2025-01-29 RX ADMIN — POLYETHYLENE GLYCOL 3350 17 GRAM(S): 17 POWDER, FOR SOLUTION ORAL at 09:48

## 2025-01-29 RX ADMIN — Medication 100 MILLIGRAM(S): at 09:47

## 2025-01-29 RX ADMIN — AMIODARONE HYDROCHLORIDE 200 MILLIGRAM(S): 50 INJECTION, SOLUTION INTRAVENOUS at 09:48

## 2025-01-29 RX ADMIN — Medication 40 MILLIGRAM(S): at 09:48

## 2025-01-29 RX ADMIN — MEROPENEM 1000 MILLIGRAM(S): 500 INJECTION INTRAVENOUS at 05:55

## 2025-01-29 RX ADMIN — HYDROMORPHONE HYDROCHLORIDE 0.5 MILLIGRAM(S): 4 INJECTION, SOLUTION INTRAMUSCULAR; INTRAVENOUS; SUBCUTANEOUS at 10:42

## 2025-01-29 RX ADMIN — OXYCODONE HYDROCHLORIDE 5 MILLIGRAM(S): 30 TABLET ORAL at 09:48

## 2025-01-29 RX ADMIN — MEROPENEM 1000 MILLIGRAM(S): 500 INJECTION INTRAVENOUS at 22:01

## 2025-01-29 RX ADMIN — MEROPENEM 1000 MILLIGRAM(S): 500 INJECTION INTRAVENOUS at 13:55

## 2025-01-29 RX ADMIN — Medication 1 DROP(S): at 22:00

## 2025-01-29 RX ADMIN — ACETAMINOPHEN 650 MILLIGRAM(S): 160 SUSPENSION ORAL at 06:43

## 2025-01-29 RX ADMIN — Medication 2 TABLET(S): at 22:01

## 2025-01-29 NOTE — PROGRESS NOTE ADULT - SUBJECTIVE AND OBJECTIVE BOX
SURGERY DAILY PROGRESS NOTE:     Subjective:  Patient seen and examined at bedside during am rounds. AVSS. Denies any fevers, chills, n/v/d, chest pain or shortness of breath    Objective:    MEDICATIONS  (STANDING):  aMIOdarone    Tablet 200 milliGRAM(s) Oral daily  atorvastatin 10 milliGRAM(s) Oral at bedtime  caspofungin IVPB 50 milliGRAM(s) IV Intermittent every 24 hours  caspofungin IVPB      furosemide    Tablet 40 milliGRAM(s) Oral daily  heparin   Injectable 5000 Unit(s) SubCutaneous every 8 hours  influenza  Vaccine (HIGH DOSE) 0.5 milliLiter(s) IntraMuscular once  meropenem Injectable 1000 milliGRAM(s) IV Push every 8 hours  montelukast 10 milliGRAM(s) Oral at bedtime  multivitamin/minerals 1 Tablet(s) Oral daily  naloxone Injectable 0.4 milliGRAM(s) IV Push once  pantoprazole  Injectable 40 milliGRAM(s) IV Push every 12 hours  polyethylene glycol 3350 17 Gram(s) Oral daily  senna 2 Tablet(s) Oral at bedtime  thiamine 100 milliGRAM(s) Oral daily    MEDICATIONS  (PRN):  acetaminophen     Tablet .. 650 milliGRAM(s) Oral every 6 hours PRN Temp greater or equal to 38C (100.4F)  aluminum hydroxide/magnesium hydroxide/simethicone Suspension 30 milliLiter(s) Oral every 4 hours PRN Dyspepsia  artificial  tears Solution 1 Drop(s) Both EYES every 2 hours PRN Dry Eyes  bisacodyl 5 milliGRAM(s) Oral daily PRN Constipation  HYDROmorphone  Injectable 0.5 milliGRAM(s) IV Push every 4 hours PRN Severe Pain (7 - 10)  LORazepam     Tablet 0.5 milliGRAM(s) Oral two times a day PRN Anxiety  melatonin 3 milliGRAM(s) Oral at bedtime PRN Insomnia  ondansetron Injectable 4 milliGRAM(s) IV Push every 6 hours PRN Nausea and/or Vomiting  oxyCODONE    IR 5 milliGRAM(s) Oral every 6 hours PRN Moderate Pain (4 - 6)  zolpidem 5 milliGRAM(s) Oral at bedtime PRN Insomnia      Vital Signs Last 24 Hrs  T(C): 36.9 (2025 00:02), Max: 37.2 (2025 08:38)  T(F): 98.4 (2025 00:02), Max: 98.9 (2025 08:38)  HR: 59 (2025 00:02) (54 - 64)  BP: 110/54 (2025 00:02) (110/54 - 132/49)  BP(mean): --  RR: 18 (2025 00:02) (15 - 20)  SpO2: 95% (2025 00:02) (95% - 99%)    Parameters below as of 2025 00:02  Patient On (Oxygen Delivery Method): room air          PHYSICAL EXAM   Gen: well-appearing, in no acute distress  CV: pulse regularly present   Resp: airway patent, non-labored breathing  Abd: soft, NTND; no rebound or guarding. R groin incision with prevena. BELGICA drain in situ   Ext. no cyanosis or edema      I&O's Detail    2025 07:01  -  2025 07:00  --------------------------------------------------------  IN:  Total IN: 0 mL    OUT:    Voided (mL): 650 mL  Total OUT: 650 mL    Total NET: -650 mL      2025 07:01  -  2025 05:42  --------------------------------------------------------  IN:    Other (mL): 500 mL  Total IN: 500 mL    OUT:  Total OUT: 0 mL    Total NET: 500 mL          Daily     Daily Weight in k.2 (2025 05:24)    LABS:                        8.8    5.90  )-----------( 282      ( 2025 05:27 )             29.2         136  |  99  |  11  ----------------------------<  100[H]  3.6   |  33[H]  |  0.63    Ca    8.6      2025 05:27  Phos  3.4       Mg     1.6           PT/INR - ( 2025 05:27 )   PT: 14.1 sec;   INR: 1.23 ratio         PTT - ( 2025 07:57 )  PTT:39.8 sec  Urinalysis Basic - ( 2025 05:27 )    Color: x / Appearance: x / SG: x / pH: x  Gluc: 100 mg/dL / Ketone: x  / Bili: x / Urobili: x   Blood: x / Protein: x / Nitrite: x   Leuk Esterase: x / RBC: x / WBC x   Sq Epi: x / Non Sq Epi: x / Bacteria: x

## 2025-01-29 NOTE — PHYSICAL THERAPY INITIAL EVALUATION ADULT - CRITERIA FOR SKILLED THERAPEUTIC INTERVENTIONS
Unable to determine at this time will continue to follow this pt's progress
Unable to determine at this time will continue to follow this pt's progress

## 2025-01-29 NOTE — PHYSICAL THERAPY INITIAL EVALUATION ADULT - NSPTDMEREC_GEN_A_CORE
TBD will cont to follow with pts progression/rolling walker
TBD will cont to follow with pts progression/rolling walker

## 2025-01-29 NOTE — PHYSICAL THERAPY INITIAL EVALUATION ADULT - IMPAIRMENTS FOUND, PT EVAL
TBD with further assessment of bed mobility, transfers, and gait
TBD with further assessment of bed mobillity, transfers, and gait

## 2025-01-29 NOTE — PROGRESS NOTE ADULT - SUBJECTIVE AND OBJECTIVE BOX
Date of service: 01-29-25 @ 10:24    Lying in bed in NAD  Events noted  s/p right groin wound surgery s/p washout  Has right inguinal tenderness  No fever    ROS: no fever or chills; denies dizziness, no HA, no SOB or cough, no abdominal pain, no diarrhea or constipation; no dysuria, no legs pain, no rashes    MEDICATIONS  (STANDING):  aMIOdarone    Tablet 200 milliGRAM(s) Oral daily  atorvastatin 10 milliGRAM(s) Oral at bedtime  caspofungin IVPB 50 milliGRAM(s) IV Intermittent every 24 hours  caspofungin IVPB      furosemide    Tablet 40 milliGRAM(s) Oral daily  heparin   Injectable 5000 Unit(s) SubCutaneous every 8 hours  influenza  Vaccine (HIGH DOSE) 0.5 milliLiter(s) IntraMuscular once  meropenem Injectable 1000 milliGRAM(s) IV Push every 8 hours  montelukast 10 milliGRAM(s) Oral at bedtime  multivitamin/minerals 1 Tablet(s) Oral daily  naloxone Injectable 0.4 milliGRAM(s) IV Push once  pantoprazole  Injectable 40 milliGRAM(s) IV Push every 12 hours  polyethylene glycol 3350 17 Gram(s) Oral daily  senna 2 Tablet(s) Oral at bedtime  thiamine 100 milliGRAM(s) Oral daily    Vital Signs Last 24 Hrs  T(C): 36.9 (29 Jan 2025 08:40), Max: 36.9 (29 Jan 2025 00:02)  T(F): 98.4 (29 Jan 2025 08:40), Max: 98.4 (29 Jan 2025 00:02)  HR: 56 (29 Jan 2025 08:40) (56 - 64)  BP: 127/57 (29 Jan 2025 08:40) (110/54 - 127/57)  BP(mean): 76 (29 Jan 2025 08:40) (76 - 76)  RR: 18 (29 Jan 2025 08:40) (15 - 20)  SpO2: 97% (29 Jan 2025 08:40) (95% - 99%)    Parameters below as of 29 Jan 2025 00:02  Patient On (Oxygen Delivery Method): room air     Physical exam:    Constitutional:  No acute distress  HEENT: NC/AT, EOMI, PERRLA, conjunctivae clear; ears and nose atraumatic; pharynx benign  Neck: supple; thyroid not palpable  Back: no tenderness  Respiratory: respiratory effort normal; clear to auscultation  Cardiovascular: S1S2 regular, no murmurs  Abdomen: soft, not tender, not distended, positive BS; no liver or spleen organomegaly  Genitourinary: no suprapubic tenderness  Lymphatic: no LN palpable  Musculoskeletal: no muscle tenderness, no joint swelling or tenderness  Extremities: no pedal edema  Right inguinal postsurgical wound dressed  Neurological/ Psychiatric: AxOx3, judgement and insight normal; moving all extremities  Skin: no rashes; no palpable lesions    Labs: reviewed                        9.3    6.00  )-----------( 331      ( 29 Jan 2025 07:30 )             31.0     01-29    134[L]  |  99  |  12  ----------------------------<  128[H]  3.7   |  32[H]  |  0.65    Ca    8.8      29 Jan 2025 07:30  Phos  3.4     01-28  Mg     1.6     01-28                        8.9    10.05 )-----------( 306      ( 27 Jan 2025 06:51 )             29.2     01-27    136  |  99  |  8   ----------------------------<  92  3.4[L]   |  31  |  0.62    Ca    9.1      27 Jan 2025 06:51               8.6    12.66 )-----------( 282      ( 25 Jan 2025 03:39 )             27.6     01-25    134[L]  |  104  |  16  ----------------------------<  110[H]  3.8   |  26  |  0.70    Ca    8.1[L]      25 Jan 2025 03:39  Mg     1.7     01-25    TPro  6.0  /  Alb  2.2[L]  /  TBili  0.6  /  DBili  x   /  AST  20  /  ALT  9[L]  /  AlkPhos  54  01-24                        7.9    17.46 )-----------( 286      ( 23 Jan 2025 06:26 )             25.7     01-23    132[L]  |  105  |  25[H]  ----------------------------<  96  4.7   |  22  |  1.21    Ca    8.3[L]      23 Jan 2025 06:26  Phos  4.3     01-23  Mg     1.7     01-23    TPro  6.9  /  Alb  2.6[L]  /  TBili  1.0  /  DBili  x   /  AST  37  /  ALT  9[L]  /  AlkPhos  61  01-23     LIVER FUNCTIONS - ( 23 Jan 2025 06:26 )  Alb: 2.6 g/dL / Pro: 6.9 gm/dL / ALK PHOS: 61 U/L / ALT: 9 U/L / AST: 37 U/L / GGT: x           Urinalysis with Rflx Culture (01-22 @ 11:20)  Urine Appearance: Cloudy  Protein, Urine: Negative mg/dL  Urine Microscopic-Add On (NC) (01-22 @ 11:20)  White Blood Cell - Urine: 158 /HPF  Red Blood Cell - Urine: 1 /HPF    1/8 tissue c/s ENFA and CAGL    Urinalysis with Rflx Culture (collected 22 Jan 2025 11:20)    Culture - Blood (collected 22 Jan 2025 11:20)  Source: .Blood BLOOD  Gram Stain (23 Jan 2025 02:03):    Growth in aerobic bottle: Gram Negative Rods    Growth in anaerobic bottle: Gram Negative Rods  Final Report (24 Jan 2025 11:49):    Growth in aerobic and anaerobic bottles: Escherichia coli ESBL    Direct identification is available within approximately 3-5    hours either by Blood Panel Multiplexed PCR or Direct    MALDI-TOF. Details: [email or web address]  Organism: Blood Culture PCR  Escherichia coli ESBL (24 Jan 2025 11:49)  Organism: Escherichia coli ESBL (24 Jan 2025 11:49)      Method Type: TOYA      -  Ampicillin: R >16 These ampicillin results predict results for amoxicillin      -  Ampicillin/Sulbactam: R 16/8      -  Aztreonam: R >16      -  Cefazolin: R >16      -  Cefepime: R >16      -  Ceftriaxone: R >32      -  Ciprofloxacin: R >2      -  Ertapenem: S <=0.5      -  Gentamicin: S <=2      -  Imipenem: S <=1      -  Levofloxacin: R >4      -  Meropenem: S <=1      -  Piperacillin/Tazobactam: R <=8      -  Tobramycin: S <=2      -  Trimethoprim/Sulfamethoxazole: S <=0.5/9.5  Organism: Blood Culture PCR (24 Jan 2025 11:49)      Method Type: PCR      -  Escherichia coli: Detec      -  ESBL: Detec      -  CTX-M Resistance Marker: Detec    Culture - Urine (collected 22 Jan 2025 11:20)  Source: Catheterized None  Final Report (24 Jan 2025 11:54):    >100,000 CFU/ml Escherichia coli ESBL    >100,000 CFU/ml Escherichia coli ESBL #2    Multiple Morphological Strains  Organism: Escherichia coli ESBL  Escherichia coli ESBL (24 Jan 2025 11:54)  Organism: Escherichia coli ESBL (24 Jan 2025 11:54)      Method Type: TOYA      -  Ampicillin: R >16 These ampicillin results predict results for amoxicillin      -  Ampicillin/Sulbactam: I 16/8      -  Aztreonam: R >16      -  Cefazolin: R >16 For uncomplicated UTI with K. pneumoniae, E. coli, or P. mirablis: TOYA <=16 is sensitive and TOYA >=32 is resistant. This also predicts results for oral agents cefaclor, cefdinir, cefpodoxime, cefprozil, cefuroxime axetil, cephalexin and locarbef for uncomplicated UTI. Note that some isolates may be susceptible to these agents while testing resistant to cefazolin.      -  Cefepime: R >16      -  Ceftriaxone: R >32      -  Cefuroxime: R >16      -  Ciprofloxacin: R >2      -  Ertapenem: S <=0.5      -  Gentamicin: S <=2      -  Imipenem: S <=1      -  Levofloxacin: R >4      -  Meropenem: S <=1      -  Nitrofurantoin: S <=32 Should not be used to treat pyelonephritis      -  Piperacillin/Tazobactam: S <=8      -  Tobramycin: S <=2      -  Trimethoprim/Sulfamethoxazole: S <=0.5/9.5  Organism: Escherichia coli ESBL (24 Jan 2025 11:54)      Method Type: TOYA      -  Ampicillin: R >16 These ampicillin results predict results for amoxicillin      -  Ampicillin/Sulbactam: R >16/8      -  Aztreonam: R >16      -  Cefazolin: R >16 For uncomplicated UTI with K. pneumoniae, E. coli, or P. mirablis: TOYA <=16 is sensitive and TOYA >=32 is resistant. This also predicts results for oral agents cefaclor, cefdinir, cefpodoxime, cefprozil, cefuroxime axetil, cephalexin and locarbef for uncomplicated UTI. Note that some isolates may be susceptible to these agents while testing resistant to cefazolin.      -  Cefepime: R >16      -  Ceftriaxone: R >32      -  Cefuroxime: R >16      -  Ciprofloxacin: R >2      -  Ertapenem: S <=0.5      -  Gentamicin: S <=2      -  Imipenem: S <=1      -  Levofloxacin: R >4      -  Meropenem: S <=1      -  Nitrofurantoin: S <=32 Should not be used to treat pyelonephritis      -  Piperacillin/Tazobactam: S <=8      -  Tobramycin: S <=2      -  Trimethoprim/Sulfamethoxazole: S <=0.5/9.5    Culture - Blood (collected 27 Jan 2025 06:55)  Source: .Blood BLOOD  Preliminary Report (28 Jan 2025 13:01):    No growth at 24 hours    Culture - Blood (collected 27 Jan 2025 06:51)  Source: .Blood BLOOD  Preliminary Report (28 Jan 2025 13:01):    No growth at 24 hours    Radiology: all available radiological tests reviewed    Advanced directives addressed: full resuscitation

## 2025-01-29 NOTE — PHYSICAL THERAPY INITIAL EVALUATION ADULT - ACTIVE RANGE OF MOTION EXAMINATION, REHAB EVAL
RLE hip/knee flexion limited by pain, b/l dorsiflexion WFL/Left UE Active ROM was WFL (within functional limits)/Right UE Active ROM was WFL (within functional limits)/deficits as listed below

## 2025-01-29 NOTE — PROGRESS NOTE ADULT - SUBJECTIVE AND OBJECTIVE BOX
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 Patient reports she had felt like her usual self since her last discharge until today. She had acute onset subjective fevers, chills, rigors d/t which she came to ED. She was discharged from  on 1/18 with a course of ciprofloxacin which she reports being compliant with, reports having "5 pills" left in the course. She denies any N/V, cough, sore throat, rhinitis, chest pain, rash, diarrhea or dysuria.   In ED patient febrile with Tmax of 101.9, Soft BP in 90s/40-50s, HR in 70s. CBC noted for WBC 21, H/H 5.8/19, Plt 441. CMP noted for BUN/Cr 28/1.04. Lactate 2.5 > 3.7. F/u/COVID negative. UA cloudy, with trace ketones, mod leuks, pos nitrites, 158 wbcs. Guaiac positive. CTA of abdomen and b/l LE negative for acute hemorrhage. CXR read as "grossly clear lungs". Patient met sepsis criteria, cultures obtained and patient recieved broad spectrum antibiotics with vancomycin and ceftriaxone in ED. Patient also recieved 2L NS and ordered for 2 U pRBCs. Patient admitted to  for sepsis and symptomatic anemia.       Subjective: Patient seen and examined.  Patient reports that her lower extremity wound pain is worse today compared to yesterday.  Patient in mild distress due to the pain.  Vascular surgery attempted wound VAC reapplication today however patient refused.   at bedside.  Patient denies any chest pain shortness of breath nausea vomiting diarrhea.  Continue IV antibiotics and antifungal.  WBC is improved to 9.97.  Hemoglobin stable at 8.8.     Additional results/Imaging, I have personally reviewed:    LABS:All other systems reviewed and found to be negative with the exception of what has been described above.    1/29 denies sob, no CP, has r groin drain, post op pain controlled with Iv meds     PHYSICAL EXAM:  Constitutional: NAD, awake and alert, obese, chronically ill appearing   HEENT: PERRLA, EOMI, MMM  Respiratory: Breath sounds are clear bilaterally, No wheezing, rales or rhonchi  Cardiovascular: S1 and S2, RRR, no murmurs, gallops or rubs  Gastrointestinal: +BS, soft, non-tender, non-distended, no CVA tenderness  Extremities: No peripheral edema, +DP pulses b/l  Neurological: A&O x 3, no focal deficits  Musculoskeletal: 5/5 strength b/l upper and lower extremities  Skin: Chronic LE venous stasis changes, +R groin wound foul smelling, skin warm and dry      labs reviewed

## 2025-01-29 NOTE — PROGRESS NOTE ADULT - ASSESSMENT
74-year-old F, with PMHx of DM, TAVR 8/2024 on Plavix , A-fib on Xarelto, CHF, HLD, PVD, b/l LE venous insufficiency, bilateral CFA stenoses s/p Rt SFA endarterectomy on 12/17/24 with R groin surgical site infection.     #S/p R groin I and D with complex wound closure, POD#1      PLAN  -Resume hep gtt this AM   -Monitor drain output  -Cont abx   - Cards: deferred ASHLEY for now; appreciate preop   -Rest of care per primary team

## 2025-01-29 NOTE — PHYSICAL THERAPY INITIAL EVALUATION ADULT - LIVES WITH, PROFILE
spouse; Pt lives w/ her  in a home w/ 2 SHARON + HR and a chair lift in the house/spouse
spouse; Pt lives w/ her  in a home w/ 2 [place]AR[place] + HR and a chair lift in the house/spouse

## 2025-01-29 NOTE — PHYSICAL THERAPY INITIAL EVALUATION ADULT - PERTINENT HX OF CURRENT PROBLEM, REHAB EVAL
74-year-old female presenting to the ED on 1/22/25 with c/o with acute onset subjective fevers, chills, rigors. Pt was discharged from  on 1/18/25, pt reports feeling like her usual self up until day of admission.   Pt with hx of DM, TAVR 8/2024 on Plavix , A-fib on Xarelto, CHF, HLD, PVD, b/l LE venous insufficiency, b/l CFA stenoses s/p Rt SFA endarterectomy on 12/17/24 complicated by postoperative seroma requiring irrigation and debridement and wound VAC/pemrose drain placement. Seen on 1/18/25 for dislodgment of wound VAC tubing. Presents for evaluation of fever/chills and rigors since last night. Pt D/C from  on 1/18 with a course of ciprofloxacin which she reports being compliant with.   pt now s/p  R groin I and D with complex wound closure on 1/28/25

## 2025-01-29 NOTE — PROGRESS NOTE ADULT - ASSESSMENT
74-year-old Female with h/o DM type 2, TAVR 8/2024 on Plavix , A-fib on Xarelto, CHF, HLD, PVD, b/l LE venous insufficiency, bilateral CFA stenoses s/p Rt SFA endarterectomy on 12/17/24, recently hospitalized on 1/3 with right inguinal cellulitis/ postsurgical infection s/p right SFA endarterectomy with underlying inguinal collection treated with vancomycin 750 mg IV q12h, cefepime 2 gm IV q12h and metronidazole 500 mg IV q8h, then PO abx with PO cipro was admitted on 1/22 fever to Tmax of 101.9F, Soft BP in 90s/40-50s. Patient met sepsis criteria, cultures obtained and patient received vancomycin IV and ceftriaxone, then meropenem.    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 UTI with ESBL E.coli  #PVD  #Allergy to PCN  -cultures reviewed  -on meropenem 1 gm IV q8h and caspofungin 50 mg IV qd # 7  -tolerating abx well so far; no side effects noted  -monitor closely in ej of PCN allergy history  -surgical evaluation appreciated - s/p washout  -local wound care  -cardiology evaluation appreciated   -f/u new surgical cultures  -continue abx coverage   -repeat BC x 2 noted   -f/u cultures  -monitor temps  -f/u CBC  -supportive care  2. Other issues:   -care per medicine    d/w medicine team    The patient may need prolonged IV antimicrobial therapy

## 2025-01-29 NOTE — PROGRESS NOTE ADULT - SUBJECTIVE AND OBJECTIVE BOX
HPI: pt seen and examined with no family at bedside, patient Domenic complaints at this time, goals are clear so will sign off please reconsult if can be assistance     PAIN: (x )Yes   ( )No  generalized pain all over     DYSPNEA: ( ) Yes  ( ) No  Level:    Review of Systems:    Physical Discomfort- generalized pain but unable to further describe   Fatigue- yes  Weakness- yes    All other systems reviewed and negative    PHYSICAL EXAM:    Vital Signs Last 24 Hrs  T(C): 36.9 (2025 08:40), Max: 36.9 (2025 00:02)  T(F): 98.4 (2025 08:40), Max: 98.4 (2025 00:02)  HR: 56 (2025 08:40) (56 - 64)  BP: 127/57 (2025 08:40) (110/54 - 127/57)  BP(mean): 76 (2025 08:40) (76 - 76)  RR: 18 (2025 08:40) (15 - 20)  SpO2: 97% (2025 08:40) (95% - 99%)    Parameters below as of 2025 00:02  Patient On (Oxygen Delivery Method): room air    Daily Weight in k.2 (2025 05:24)    PPSV2: 60  %    General: pleasant female in bed, NAD  Mental Status: alert and oriented   HEENT: nasal cannula in place   Lungs: diminished breath sounds b/l   Cardiac: s1s2 +   GI: nontender, nondistended, +BS   : +voiding   Ext: edema +, discoloration +CAPPS   Neuro: weakness, non focal       LABS:                        9.3    6.00  )-----------( 331      ( 2025 07:30 )             31.0     01-    134[L]  |  99  |  12  ----------------------------<  128[H]  3.7   |  32[H]  |  0.65    Ca    8.8      2025 07:30  Phos  3.4     01-28  Mg     1.6     01-28      PT/INR - ( 2025 05:27 )   PT: 14.1 sec;   INR: 1.23 ratio         PTT - ( 2025 07:57 )  PTT:39.8 sec  Albumin: Albumin: 2.2 g/dL ( @ 07:04)      Allergies    pregabalin (Unknown)  latex (Unknown)  penicillin (Hives; Urticaria)  PC Pen VK (Rash)    Intolerances      MEDICATIONS  (STANDING):  aMIOdarone    Tablet 200 milliGRAM(s) Oral daily  atorvastatin 10 milliGRAM(s) Oral at bedtime  caspofungin IVPB 50 milliGRAM(s) IV Intermittent every 24 hours  caspofungin IVPB      furosemide    Tablet 40 milliGRAM(s) Oral daily  heparin   Injectable 8500 Unit(s) IV Push once  heparin  Infusion. 2000 Unit(s)/Hr (20 mL/Hr) IV Continuous <Continuous>  influenza  Vaccine (HIGH DOSE) 0.5 milliLiter(s) IntraMuscular once  meropenem Injectable 1000 milliGRAM(s) IV Push every 8 hours  montelukast 10 milliGRAM(s) Oral at bedtime  multivitamin/minerals 1 Tablet(s) Oral daily  naloxone Injectable 0.4 milliGRAM(s) IV Push once  pantoprazole  Injectable 40 milliGRAM(s) IV Push every 12 hours  polyethylene glycol 3350 17 Gram(s) Oral daily  senna 2 Tablet(s) Oral at bedtime  thiamine 100 milliGRAM(s) Oral daily    MEDICATIONS  (PRN):  acetaminophen     Tablet .. 650 milliGRAM(s) Oral every 6 hours PRN Temp greater or equal to 38C (100.4F)  aluminum hydroxide/magnesium hydroxide/simethicone Suspension 30 milliLiter(s) Oral every 4 hours PRN Dyspepsia  artificial  tears Solution 1 Drop(s) Both EYES every 2 hours PRN Dry Eyes  bisacodyl 5 milliGRAM(s) Oral daily PRN Constipation  heparin   Injectable 8500 Unit(s) IV Push every 6 hours PRN For aPTT less than 40  heparin   Injectable 4000 Unit(s) IV Push every 6 hours PRN For aPTT between 40 - 57  HYDROmorphone  Injectable 0.5 milliGRAM(s) IV Push every 4 hours PRN Severe Pain (7 - 10)  LORazepam     Tablet 0.5 milliGRAM(s) Oral two times a day PRN Anxiety  melatonin 3 milliGRAM(s) Oral at bedtime PRN Insomnia  ondansetron Injectable 4 milliGRAM(s) IV Push every 6 hours PRN Nausea and/or Vomiting  oxyCODONE    IR 5 milliGRAM(s) Oral every 6 hours PRN Moderate Pain (4 - 6)  zolpidem 5 milliGRAM(s) Oral at bedtime PRN Insomnia      RADIOLOGY:

## 2025-01-29 NOTE — PHYSICAL THERAPY INITIAL EVALUATION ADULT - DIAGNOSIS, PT EVAL
Sepsis, Symptomatic anemia, Right groin wound, CHF HFpEF, A-fib
Sepsis, Symptomatic anemia, Right groin wound, CHF HFpEF, A-fib

## 2025-01-29 NOTE — PROGRESS NOTE ADULT - ASSESSMENT
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 Patient reports she had felt like her usual self since her last discharge until today. She had acute onset subjective fevers, chills, rigors d/t which she came to ED. In ED patient febrile with Tmax of 101.9, Soft BP in 90s/40-50s, HR in 70s. CBC noted for WBC 21, H/H 5.8/19. Admitted for:    #Sepsis 2/2 ESBL E. Coli bacteremia , ESBL E. Coli UTI#Right inguinal postsurgical bacterial and fungal infection s/p right SFA endarterectomy s/p collection with ENFA and CAGL  - Consulted ICU in ED, patient deemed stable for tele floor  - Tmax 101.9F, WBC 21 -> 14, lactate 3.7 -> 1.6  - RVP negative, CXR clear   Meropenem and caspofungin, repeat OR cultures today, s/p R groin I & D wash out 1/28, has drain   - CT RLE as above- ID consulted , added caspofungin for recent wound cx   - vanco stopped per ID  - Started IV meropenem 100mg q8h and caspofungin 50 mg IV QD on 1/23 (Day 4)   - BCx and UCx 1/22 both growing ESBL E. Coli, etiology of bacteremia likely from UTI  - Cardiology consulted, feel less likely presentation c/f endocarditis, holding off on ASHLEY at this time  - Vascular surgery following Dr. Manjarrez   - Wound vac d/c'd per vascular, continue wet to dry dressings daily- restart wound vac when available   -Patient declined wound vac placement today  - tentative plan for OR on Tuesday for R. groin wound wash out, holding xarelto/plavix at this time, c/w hep gtt in anticipation of procedure  - PRN oxycodone for pain controlWill escalate pain management with IV dilaudid 0.5mg q5 for severe pain     #Acute blood loss anemia 2/2 GIB   - Patient on Xarelto for Afib, with guaiac positive test in ED  - CTA abd/pelvis negative for acute bleed  - C/w protonix BID  - Hgb 5.8 4 units totol pRBC- 8.8 today and stable   - Placed on heparin gtt to determine if can tolerate AC (xarelto/plavix remain held)   - Hold her metoprolol, cardizem, and aldactone d/t soft BP, resume when appropriate   - GI consulted, no current plan for scope at this time   - Continue to trend H/H    #Chronic heart failure with preserved ejection fraction (HFpEF)  #AS s/p TAVR  -Last ECHO done on 12/05/2024: LVEF 56%, LV cavity moderately dilated, normal wall thickness, normal LVEF, severe (grade 3) LV diastolic dysfunction, severe LA dilatation, mod to moderate RA dilatation, normal RV size and function.   -C/w her lasix, hold her metoprolol and aldactone d/t ABLA and soft BP     -Monitor weights, strict ins and outs    .#paroxsymal Atrial fibrillation   #Elevated troponin   - In sinus rhythm here. - C/w her amiodarone.   - Holding Cardizem, metoprolol and xarelto d/t soft BP and ABLA, Resume when appropriate   on heparin dripcurrently  - Troponin trend as above  - Cardiology following Dr Sivan Jimenez     #DM2 (diabetes mellitus)    - Last A1c 5.8 (4/2024)  - Not on any medications.   - Carb restricted diet when appropriate.    #HLD (hyperlipidemia)   - on home statin and Zetia  - C/w her statin.    # Insomnia.  - Ambien PRN    #Anxiety   - trial PO ativan 0.5mg PRN for anxiety   #DVT ppx   - SCDs. heparin gtt  Holding Xarelto as above in anticipation of procedure     dispo tele sinus ankush 50-60 no events , not in acute heart failure, medically no acute contraindications for OR by vascular for wash out r kory adams Dr  total time spent 55mins

## 2025-01-29 NOTE — PROGRESS NOTE ADULT - ASSESSMENT
Pt is a 74y old Female with hx of DM, TAVR 8/2024 on Plavix , A-fib on Xarelto, CHF, HLD, PVD, b/l LE venous insufficiency, bilateral CFA stenoses s/p Rt SFA endarterectomy on 12/17/24 complicated by postoperative seroma requiring irrigation and debridement and wound VAC placement Seen on January 18 for dislodgment of wound VAC tubing presents for evaluation of fever/chills and rigors since last night. Patient reports she had felt like her usual self since her last discharge until today. She had acute onset subjective fevers, chills, rigors d/t which she came to ED. In ED patient febrile with Tmax of 101.9, Soft BP in 90s/40-50s, HR in 70s. CBC noted for WBC 21, H/H 5.8/19. Palliative medicine consulted to help establish GOC and advance care planing     1) Sepsis   - possible UTI   - right inguinal infection- wound ( s/p debridement on 1/8 with wound vac and replaced on 1/11)   - vascular notes appreciated   - + bacteremia   - c/w vanco/radha and caspofungin   - ID consult appreciated  - would consult appreciated    2) Anemia   - r/o GIB   - + occult blood   - GI consult appreciated   - monitor labs   - transfuse PRN      3) Heart failure and Afib   - c/w her Lasix  - hold her metoprolol and aldactone at this time   - Monitor weights, strict ins and outs  - cardiology consult appreciated     Process of Care  --Reviewed dx/treatment problems and alignment with Goals of Care    Physical Aspects of Care  --Pain  patient denies at this time  c/w oxycodone 5mg every 6 hours PRN for moderate pain   c/w oxycodone 10mg every 6 hours PRN for severe pain     --Dyspnea  No SOB at this time  comfortable and in NAD    --Nausea Vomiting  denies    --Weakness  PT as tolerated     Psychological and Psychiatric Aspects of Care:   --Greif/Bereavment: emotional support provided  -Pastoral Care Available PRN     Social Aspects of Care  -SW involved     Cultural Aspects  -Primary Language: English    Goals of Care:     We discussed Palliative Care team being a supportive team when a patient has ongoing illnesses.  We also discussed that it is not an end of life care service, but can help navigate symptoms and emotional support througout their hospital stay here.    Ethical and Legal Aspects: NA  Capacity- pt appears to have capacity   HCP/Surrogate:  Manny alternate son Jony  Code Status- full code   MOLST-   Dispo Plan-    Discussed With: Dr. Isidro coordinated with attending and SW and RN     Time Spent: 40 minutes including the care, coordination and counseling of this patient, 50% of which was spent coordinating and counseling. Will sing off please reconsult

## 2025-01-29 NOTE — PHYSICAL THERAPY INITIAL EVALUATION ADULT - GENERAL OBSERVATIONS, REHAB EVAL
pt received in bed on 3E. pt tearful, not wanting to get OOB with PT at this time.  pt agreeable to bedside eval. pt left as found in bed, CBWR, bed alarm on, oriented to call bell system.

## 2025-01-30 LAB
ANION GAP SERPL CALC-SCNC: 3 MMOL/L — LOW (ref 5–17)
APTT BLD: 100.8 SEC — HIGH (ref 24.5–35.6)
APTT BLD: 48.3 SEC — HIGH (ref 24.5–35.6)
BASOPHILS # BLD AUTO: 0.06 K/UL — SIGNIFICANT CHANGE UP (ref 0–0.2)
BASOPHILS NFR BLD AUTO: 0.9 % — SIGNIFICANT CHANGE UP (ref 0–2)
BUN SERPL-MCNC: 18 MG/DL — SIGNIFICANT CHANGE UP (ref 7–23)
CALCIUM SERPL-MCNC: 8.6 MG/DL — SIGNIFICANT CHANGE UP (ref 8.5–10.1)
CHLORIDE SERPL-SCNC: 98 MMOL/L — SIGNIFICANT CHANGE UP (ref 96–108)
CO2 SERPL-SCNC: 32 MMOL/L — HIGH (ref 22–31)
CREAT SERPL-MCNC: 0.74 MG/DL — SIGNIFICANT CHANGE UP (ref 0.5–1.3)
EGFR: 84 ML/MIN/1.73M2 — SIGNIFICANT CHANGE UP
EOSINOPHIL # BLD AUTO: 0.2 K/UL — SIGNIFICANT CHANGE UP (ref 0–0.5)
EOSINOPHIL NFR BLD AUTO: 2.9 % — SIGNIFICANT CHANGE UP (ref 0–6)
GLUCOSE SERPL-MCNC: 114 MG/DL — HIGH (ref 70–99)
HCT VFR BLD CALC: 28.8 % — LOW (ref 34.5–45)
HGB BLD-MCNC: 8.5 G/DL — LOW (ref 11.5–15.5)
IMM GRANULOCYTES NFR BLD AUTO: 1 % — HIGH (ref 0–0.9)
LYMPHOCYTES # BLD AUTO: 1.57 K/UL — SIGNIFICANT CHANGE UP (ref 1–3.3)
LYMPHOCYTES # BLD AUTO: 22.7 % — SIGNIFICANT CHANGE UP (ref 13–44)
MCHC RBC-ENTMCNC: 25.9 PG — LOW (ref 27–34)
MCHC RBC-ENTMCNC: 29.5 G/DL — LOW (ref 32–36)
MCV RBC AUTO: 87.8 FL — SIGNIFICANT CHANGE UP (ref 80–100)
MONOCYTES # BLD AUTO: 0.66 K/UL — SIGNIFICANT CHANGE UP (ref 0–0.9)
MONOCYTES NFR BLD AUTO: 9.5 % — SIGNIFICANT CHANGE UP (ref 2–14)
NEUTROPHILS # BLD AUTO: 4.36 K/UL — SIGNIFICANT CHANGE UP (ref 1.8–7.4)
NEUTROPHILS NFR BLD AUTO: 63 % — SIGNIFICANT CHANGE UP (ref 43–77)
PLATELET # BLD AUTO: 332 K/UL — SIGNIFICANT CHANGE UP (ref 150–400)
POTASSIUM SERPL-MCNC: 3.7 MMOL/L — SIGNIFICANT CHANGE UP (ref 3.5–5.3)
POTASSIUM SERPL-SCNC: 3.7 MMOL/L — SIGNIFICANT CHANGE UP (ref 3.5–5.3)
RBC # BLD: 3.28 M/UL — LOW (ref 3.8–5.2)
RBC # FLD: 16.9 % — HIGH (ref 10.3–14.5)
SODIUM SERPL-SCNC: 133 MMOL/L — LOW (ref 135–145)
WBC # BLD: 6.92 K/UL — SIGNIFICANT CHANGE UP (ref 3.8–10.5)
WBC # FLD AUTO: 6.92 K/UL — SIGNIFICANT CHANGE UP (ref 3.8–10.5)

## 2025-01-30 PROCEDURE — 99233 SBSQ HOSP IP/OBS HIGH 50: CPT

## 2025-01-30 RX ORDER — RIVAROXABAN 20 MG/1
20 TABLET, FILM COATED ORAL DAILY
Refills: 0 | Status: DISCONTINUED | OUTPATIENT
Start: 2025-01-30 | End: 2025-02-04

## 2025-01-30 RX ADMIN — HYDROMORPHONE HYDROCHLORIDE 0.5 MILLIGRAM(S): 4 INJECTION, SOLUTION INTRAMUSCULAR; INTRAVENOUS; SUBCUTANEOUS at 00:14

## 2025-01-30 RX ADMIN — HYDROMORPHONE HYDROCHLORIDE 0.5 MILLIGRAM(S): 4 INJECTION, SOLUTION INTRAMUSCULAR; INTRAVENOUS; SUBCUTANEOUS at 23:30

## 2025-01-30 RX ADMIN — Medication 1800 UNIT(S)/HR: at 07:04

## 2025-01-30 RX ADMIN — MEROPENEM 1000 MILLIGRAM(S): 500 INJECTION INTRAVENOUS at 15:55

## 2025-01-30 RX ADMIN — Medication 100 MILLIGRAM(S): at 10:53

## 2025-01-30 RX ADMIN — ZOLPIDEM TARTRATE 5 MILLIGRAM(S): 5 TABLET, COATED ORAL at 22:10

## 2025-01-30 RX ADMIN — MEROPENEM 1000 MILLIGRAM(S): 500 INJECTION INTRAVENOUS at 21:59

## 2025-01-30 RX ADMIN — ATORVASTATIN CALCIUM 10 MILLIGRAM(S): 80 TABLET, FILM COATED ORAL at 21:58

## 2025-01-30 RX ADMIN — PANTOPRAZOLE 40 MILLIGRAM(S): 20 TABLET, DELAYED RELEASE ORAL at 15:56

## 2025-01-30 RX ADMIN — MEROPENEM 1000 MILLIGRAM(S): 500 INJECTION INTRAVENOUS at 06:47

## 2025-01-30 RX ADMIN — PANTOPRAZOLE 40 MILLIGRAM(S): 20 TABLET, DELAYED RELEASE ORAL at 06:48

## 2025-01-30 RX ADMIN — Medication 2000 UNIT(S)/HR: at 02:14

## 2025-01-30 RX ADMIN — Medication 40 MILLIGRAM(S): at 10:53

## 2025-01-30 RX ADMIN — Medication 1800 UNIT(S)/HR: at 06:53

## 2025-01-30 RX ADMIN — MONTELUKAST SODIUM 10 MILLIGRAM(S): 5 TABLET, CHEWABLE ORAL at 21:58

## 2025-01-30 RX ADMIN — Medication 1 TABLET(S): at 10:53

## 2025-01-30 RX ADMIN — Medication 2 TABLET(S): at 21:58

## 2025-01-30 RX ADMIN — HYDROMORPHONE HYDROCHLORIDE 0.5 MILLIGRAM(S): 4 INJECTION, SOLUTION INTRAMUSCULAR; INTRAVENOUS; SUBCUTANEOUS at 16:46

## 2025-01-30 RX ADMIN — CASPOFUNGIN ACETATE 260 MILLIGRAM(S): 5 INJECTION, POWDER, LYOPHILIZED, FOR SOLUTION INTRAVENOUS at 10:53

## 2025-01-30 RX ADMIN — HYDROMORPHONE HYDROCHLORIDE 0.5 MILLIGRAM(S): 4 INJECTION, SOLUTION INTRAMUSCULAR; INTRAVENOUS; SUBCUTANEOUS at 12:22

## 2025-01-30 RX ADMIN — HYDROMORPHONE HYDROCHLORIDE 0.5 MILLIGRAM(S): 4 INJECTION, SOLUTION INTRAMUSCULAR; INTRAVENOUS; SUBCUTANEOUS at 23:13

## 2025-01-30 RX ADMIN — POLYETHYLENE GLYCOL 3350 17 GRAM(S): 17 POWDER, FOR SOLUTION ORAL at 10:56

## 2025-01-30 NOTE — PROGRESS NOTE ADULT - ASSESSMENT
74-year-old F, with PMHx of DM, TAVR 8/2024 on Plavix , A-fib on Xarelto, CHF, HLD, PVD, b/l LE venous insufficiency, bilateral CFA stenoses s/p Rt SFA endarterectomy on 12/17/24 with R groin surgical site infection.     #S/p R groin I and D with complex wound closure, POD#2      PLAN  -Continue hep gtt this AM   -Monitor drain output  -Cont abx   -PT, OOBTC, encourage ambulation as tolerated  - Cards: deferred ASHLEY for now; appreciate preop   -Rest of care per primary team

## 2025-01-30 NOTE — PROGRESS NOTE ADULT - ASSESSMENT
74-year-old Female with h/o DM type 2, TAVR 8/2024 on Plavix , A-fib on Xarelto, CHF, HLD, PVD, b/l LE venous insufficiency, bilateral CFA stenoses s/p Rt SFA endarterectomy on 12/17/24, recently hospitalized on 1/3 with right inguinal cellulitis/ postsurgical infection s/p right SFA endarterectomy with underlying inguinal collection treated with vancomycin 750 mg IV q12h, cefepime 2 gm IV q12h and metronidazole 500 mg IV q8h, then PO abx with PO cipro was admitted on 1/22 fever to Tmax of 101.9F, Soft BP in 90s/40-50s. Patient met sepsis criteria, cultures obtained and patient received vancomycin IV and ceftriaxone, then meropenem.    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 UTI with ESBL E.coli  #PVD  #Allergy to PCN  -cultures reviewed  -on meropenem 1 gm IV q8h and caspofungin 50 mg IV qd # 7  -tolerating abx well so far; no side effects noted  -monitor closely in ej of PCN allergy history  -surgical evaluation appreciated - s/p washout  -local wound care  -cardiology evaluation appreciated   -no new surgical cultures available  -continue abx coverage   -repeat BC x 2 noted   -plan for longer term IV abx therapy  -will need PICC line and outpatient abx therapy for 42 more days  -f/u cultures  -monitor temps  -f/u CBC  -supportive care  2. Other issues:   -care per medicine    d/w medicine team    The patient may need prolonged IV antimicrobial therapy

## 2025-01-30 NOTE — PROGRESS NOTE ADULT - ASSESSMENT
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 Patient reports she had felt like her usual self since her last discharge until today. She had acute onset subjective fevers, chills, rigors d/t which she came to ED. In ED patient febrile with Tmax of 101.9, Soft BP in 90s/40-50s, HR in 70s. CBC noted for WBC 21, H/H 5.8/19. Admitted for:    #Sepsis 2/2 ESBL E. Coli bacteremia , ESBL E. Coli UTI#Right inguinal postsurgical bacterial and fungal infection s/p right SFA endarterectomy s/p collection with ENFA and CAGL  - Consulted ICU in ED, patient deemed stable for tele floor  - Tmax 101.9F, WBC 21 -> 14, lactate 3.7 -> 1.6  - RVP negative, CXR clear   Meropenem and caspofungin, repeat OR cultures today, s/p R groin I & D wash out 1/28, has drain   - CT RLE as above- ID consulted , added caspofungin for recent wound cx   - vanco stopped per ID  - Started IV meropenem 100mg q8h and caspofungin 50 mg IV QD on 1/23 (Day 4)   - BCx and UCx 1/22 both growing ESBL E. Coli, etiology of bacteremia likely from UTI  - Cardiology consulted, feel less likely presentation c/f endocarditis, holding off on ASHLEY at this time- Vascular surgery following Dr. Manjarrez   - Wound vac d/c'd per vascular, continue wet to dry dressings daily- restart wound vac when available   -Patient declined wound vac placement today  - tentative plan for OR on Tuesday for R. groin wound wash out, holding xarelto/plavix at this time, c/w hep gtt in anticipation of procedure  - PRN oxycodone for pain controlWill escalate pain management with IV dilaudid 0.5mg q5 for severe pain     #Acute blood loss anemia 2/2 GIB   - Patient on Xarelto for Afib, with guaiac positive test in ED  - CTA abd/pelvis negative for acute bleed  - C/w protonix BID  - Hgb 5.8 4 units totol pRBC- 8.8 today and stable   - Placed on heparin gtt to determine if can tolerate AC (xarelto/plavix remain held)   - Hold her metoprolol, cardizem, and aldactone d/t soft BP, resume when appropriate   - GI consulted, no current plan for scope at this time   - Continue to trend H/H    #Chronic heart failure with preserved ejection fraction (HFpEF)  #AS s/p TAVR  -Last ECHO done on 12/05/2024: LVEF 56%, LV cavity moderately dilated, normal wall thickness, normal LVEF, severe (grade 3) LV diastolic dysfunction, severe LA dilatation, mod to moderate RA dilatation, normal RV size and function.   -C/w her lasix, hold her metoprolol and aldactone d/t ABLA and soft BP     -Monitor weights, strict ins and outs    .#paroxsymal Atrial fibrillation   #Elevated troponin   - In sinus rhythm here. - C/w her amiodarone.   - Holding Cardizem, metoprolol and xarelto d/t soft BP and ABLA, Resume when appropriate   on heparin dripcurrently  - Troponin trend as above  - Cardiology following Dr Sivan Jimenez     #DM2 (diabetes mellitus)    - Last A1c 5.8 (4/2024)  - Not on any medications.   - Carb restricted diet when appropriate.#HLD (hyperlipidemia)   - on home statin and Zetia  - C/w her statin.    # Insomnia.  - Ambien PRN    #Anxiety   - trial PO ativan 0.5mg PRN for anxiety   #DVT ppx   - SCDs. heparin gtt  Holding Xarelto as above in anticipation of procedure     dispo tele sinus ankush 50-60 no events , not in acute heart failure, medically no acute contraindications for OR by vascular for wash out r kory adams Dr  total time spent 55mins

## 2025-01-30 NOTE — PROGRESS NOTE ADULT - SUBJECTIVE AND OBJECTIVE BOX
SURGERY DAILY PROGRESS NOTE:     Subjective:  Patient seen and examined at bedside during am rounds. AVSS. Denies any fevers, chills, n/v/d, chest pain or shortness of breath    Objective:    MEDICATIONS  (STANDING):  aMIOdarone    Tablet 200 milliGRAM(s) Oral daily  atorvastatin 10 milliGRAM(s) Oral at bedtime  caspofungin IVPB 50 milliGRAM(s) IV Intermittent every 24 hours  caspofungin IVPB      furosemide    Tablet 40 milliGRAM(s) Oral daily  heparin  Infusion. 2000 Unit(s)/Hr (20 mL/Hr) IV Continuous <Continuous>  influenza  Vaccine (HIGH DOSE) 0.5 milliLiter(s) IntraMuscular once  meropenem Injectable 1000 milliGRAM(s) IV Push every 8 hours  montelukast 10 milliGRAM(s) Oral at bedtime  multivitamin/minerals 1 Tablet(s) Oral daily  naloxone Injectable 0.4 milliGRAM(s) IV Push once  pantoprazole  Injectable 40 milliGRAM(s) IV Push every 12 hours  polyethylene glycol 3350 17 Gram(s) Oral daily  senna 2 Tablet(s) Oral at bedtime  thiamine 100 milliGRAM(s) Oral daily    MEDICATIONS  (PRN):  acetaminophen     Tablet .. 650 milliGRAM(s) Oral every 6 hours PRN Temp greater or equal to 38C (100.4F)  aluminum hydroxide/magnesium hydroxide/simethicone Suspension 30 milliLiter(s) Oral every 4 hours PRN Dyspepsia  artificial  tears Solution 1 Drop(s) Both EYES every 2 hours PRN Dry Eyes  bisacodyl 5 milliGRAM(s) Oral daily PRN Constipation  heparin   Injectable 8500 Unit(s) IV Push every 6 hours PRN For aPTT less than 40  heparin   Injectable 4000 Unit(s) IV Push every 6 hours PRN For aPTT between 40 - 57  HYDROmorphone  Injectable 0.5 milliGRAM(s) IV Push every 4 hours PRN Severe Pain (7 - 10)  LORazepam     Tablet 0.5 milliGRAM(s) Oral two times a day PRN Anxiety  melatonin 3 milliGRAM(s) Oral at bedtime PRN Insomnia  ondansetron Injectable 4 milliGRAM(s) IV Push every 6 hours PRN Nausea and/or Vomiting  oxyCODONE    IR 5 milliGRAM(s) Oral every 6 hours PRN Moderate Pain (4 - 6)  zolpidem 5 milliGRAM(s) Oral at bedtime PRN Insomnia      Vital Signs Last 24 Hrs  T(C): 37.2 (2025 00:14), Max: 37.2 (2025 00:14)  T(F): 99 (2025 00:14), Max: 99 (2025 00:14)  HR: 60 (2025 00:14) (55 - 60)  BP: 107/52 (2025 00:14) (107/52 - 127/57)  BP(mean): 76 (2025 08:40) (76 - 76)  RR: 18 (2025 00:14) (18 - 18)  SpO2: 98% (2025 00:14) (97% - 98%)    Parameters below as of 2025 00:14  Patient On (Oxygen Delivery Method): room air          PHYSICAL EXAM     Gen: well-appearing, in no acute distress  CV: pulse regularly present   Resp: airway patent, non-labored breathing  Abd: soft, NTND; no rebound or guarding. R groin incision with prevena. BELGICA drain in situ   Ext. no cyanosis or edema      I&O's Detail    2025 07:01  -  2025 07:00  --------------------------------------------------------  IN:    Other (mL): 500 mL  Total IN: 500 mL    OUT:    Drain (mL): 25 mL  Total OUT: 25 mL    Total NET: 475 mL      2025 07:01  -  2025 01:11  --------------------------------------------------------  IN:  Total IN: 0 mL    OUT:    Voided (mL): 100 mL  Total OUT: 100 mL    Total NET: -100 mL          Daily     Daily Weight in k.2 (2025 05:24)    LABS:                        9.2    6.57  )-----------( 345      ( 2025 20:11 )             30.8         134[L]  |  99  |  12  ----------------------------<  128[H]  3.7   |  32[H]  |  0.65    Ca    8.8      2025 07:30  Phos  3.4       Mg     1.6           PT/INR - ( 2025 05:27 )   PT: 14.1 sec;   INR: 1.23 ratio         PTT - ( 2025 20:11 )  PTT:85.2 sec  Urinalysis Basic - ( 2025 07:30 )    Color: x / Appearance: x / SG: x / pH: x  Gluc: 128 mg/dL / Ketone: x  / Bili: x / Urobili: x   Blood: x / Protein: x / Nitrite: x   Leuk Esterase: x / RBC: x / WBC x   Sq Epi: x / Non Sq Epi: x / Bacteria: x        RADIOLOGY & ADDITIONAL STUDIES:    ASSESSMENT/PLAN:

## 2025-01-30 NOTE — PROGRESS NOTE ADULT - SUBJECTIVE AND OBJECTIVE BOX
Date of service: 01-30-25 @ 11:09    Lying in bed in NAD  Weak looking  Has low grade fever  T 99F    ROS: denies dizziness, no HA, no SOB or cough, no abdominal pain, no diarrhea or constipation; no dysuria, no legs pain, no rashes    MEDICATIONS  (STANDING):  aMIOdarone    Tablet 200 milliGRAM(s) Oral daily  atorvastatin 10 milliGRAM(s) Oral at bedtime  caspofungin IVPB 50 milliGRAM(s) IV Intermittent every 24 hours  caspofungin IVPB      furosemide    Tablet 40 milliGRAM(s) Oral daily  heparin  Infusion. 2000 Unit(s)/Hr (20 mL/Hr) IV Continuous <Continuous>  influenza  Vaccine (HIGH DOSE) 0.5 milliLiter(s) IntraMuscular once  meropenem Injectable 1000 milliGRAM(s) IV Push every 8 hours  montelukast 10 milliGRAM(s) Oral at bedtime  multivitamin/minerals 1 Tablet(s) Oral daily  naloxone Injectable 0.4 milliGRAM(s) IV Push once  pantoprazole  Injectable 40 milliGRAM(s) IV Push every 12 hours  polyethylene glycol 3350 17 Gram(s) Oral daily  senna 2 Tablet(s) Oral at bedtime  thiamine 100 milliGRAM(s) Oral daily    Vital Signs Last 24 Hrs  T(C): 37.2 (30 Jan 2025 00:14), Max: 37.2 (30 Jan 2025 00:14)  T(F): 99 (30 Jan 2025 00:14), Max: 99 (30 Jan 2025 00:14)  HR: 60 (30 Jan 2025 00:14) (55 - 60)  BP: 107/52 (30 Jan 2025 00:14) (107/52 - 120/54)  BP(mean): --  RR: 18 (30 Jan 2025 00:14) (18 - 18)  SpO2: 98% (30 Jan 2025 00:14) (98% - 98%)    Parameters below as of 30 Jan 2025 00:14  Patient On (Oxygen Delivery Method): room air     Physical exam:    Constitutional:  No acute distress  HEENT: NC/AT, EOMI, PERRLA, conjunctivae clear; ears and nose atraumatic; pharynx benign  Neck: supple; thyroid not palpable  Back: no tenderness  Respiratory: respiratory effort normal; clear to auscultation  Cardiovascular: S1S2 regular, no murmurs  Abdomen: soft, not tender, not distended, positive BS; no liver or spleen organomegaly  Genitourinary: no suprapubic tenderness  Lymphatic: no LN palpable  Musculoskeletal: no muscle tenderness, no joint swelling or tenderness  Extremities: no pedal edema  Right inguinal postsurgical wound dressed  Neurological/ Psychiatric: AxOx3, judgement and insight normal; moving all extremities  Skin: no rashes; no palpable lesions    Labs: reviewed                        9.3    6.00  )-----------( 331      ( 29 Jan 2025 07:30 )             31.0     01-29    134[L]  |  99  |  12  ----------------------------<  128[H]  3.7   |  32[H]  |  0.65    Ca    8.8      29 Jan 2025 07:30  Phos  3.4     01-28  Mg     1.6     01-28                        8.9    10.05 )-----------( 306      ( 27 Jan 2025 06:51 )             29.2     01-27    136  |  99  |  8   ----------------------------<  92  3.4[L]   |  31  |  0.62    Ca    9.1      27 Jan 2025 06:51               8.6    12.66 )-----------( 282      ( 25 Jan 2025 03:39 )             27.6     01-25    134[L]  |  104  |  16  ----------------------------<  110[H]  3.8   |  26  |  0.70    Ca    8.1[L]      25 Jan 2025 03:39  Mg     1.7     01-25    TPro  6.0  /  Alb  2.2[L]  /  TBili  0.6  /  DBili  x   /  AST  20  /  ALT  9[L]  /  AlkPhos  54  01-24                        7.9    17.46 )-----------( 286      ( 23 Jan 2025 06:26 )             25.7     01-23    132[L]  |  105  |  25[H]  ----------------------------<  96  4.7   |  22  |  1.21    Ca    8.3[L]      23 Jan 2025 06:26  Phos  4.3     01-23  Mg     1.7     01-23    TPro  6.9  /  Alb  2.6[L]  /  TBili  1.0  /  DBili  x   /  AST  37  /  ALT  9[L]  /  AlkPhos  61  01-23     LIVER FUNCTIONS - ( 23 Jan 2025 06:26 )  Alb: 2.6 g/dL / Pro: 6.9 gm/dL / ALK PHOS: 61 U/L / ALT: 9 U/L / AST: 37 U/L / GGT: x           Urinalysis with Rflx Culture (01-22 @ 11:20)  Urine Appearance: Cloudy  Protein, Urine: Negative mg/dL  Urine Microscopic-Add On (NC) (01-22 @ 11:20)  White Blood Cell - Urine: 158 /HPF  Red Blood Cell - Urine: 1 /HPF    1/8 tissue c/s ENFA and CAGL    Urinalysis with Rflx Culture (collected 22 Jan 2025 11:20)    Culture - Blood (collected 22 Jan 2025 11:20)  Source: .Blood BLOOD  Gram Stain (23 Jan 2025 02:03):    Growth in aerobic bottle: Gram Negative Rods    Growth in anaerobic bottle: Gram Negative Rods  Final Report (24 Jan 2025 11:49):    Growth in aerobic and anaerobic bottles: Escherichia coli ESBL    Direct identification is available within approximately 3-5    hours either by Blood Panel Multiplexed PCR or Direct    MALDI-TOF. Details: [email or web address]  Organism: Blood Culture PCR  Escherichia coli ESBL (24 Jan 2025 11:49)  Organism: Escherichia coli ESBL (24 Jan 2025 11:49)      Method Type: TOYA      -  Ampicillin: R >16 These ampicillin results predict results for amoxicillin      -  Ampicillin/Sulbactam: R 16/8      -  Aztreonam: R >16      -  Cefazolin: R >16      -  Cefepime: R >16      -  Ceftriaxone: R >32      -  Ciprofloxacin: R >2      -  Ertapenem: S <=0.5      -  Gentamicin: S <=2      -  Imipenem: S <=1      -  Levofloxacin: R >4      -  Meropenem: S <=1      -  Piperacillin/Tazobactam: R <=8      -  Tobramycin: S <=2      -  Trimethoprim/Sulfamethoxazole: S <=0.5/9.5  Organism: Blood Culture PCR (24 Jan 2025 11:49)      Method Type: PCR      -  Escherichia coli: Detec      -  ESBL: Detec      -  CTX-M Resistance Marker: Detec    Culture - Urine (collected 22 Jan 2025 11:20)  Source: Catheterized None  Final Report (24 Jan 2025 11:54):    >100,000 CFU/ml Escherichia coli ESBL    >100,000 CFU/ml Escherichia coli ESBL #2    Multiple Morphological Strains  Organism: Escherichia coli ESBL  Escherichia coli ESBL (24 Jan 2025 11:54)  Organism: Escherichia coli ESBL (24 Jan 2025 11:54)      Method Type: TOYA      -  Ampicillin: R >16 These ampicillin results predict results for amoxicillin      -  Ampicillin/Sulbactam: I 16/8      -  Aztreonam: R >16      -  Cefazolin: R >16 For uncomplicated UTI with K. pneumoniae, E. coli, or P. mirablis: TOYA <=16 is sensitive and TOYA >=32 is resistant. This also predicts results for oral agents cefaclor, cefdinir, cefpodoxime, cefprozil, cefuroxime axetil, cephalexin and locarbef for uncomplicated UTI. Note that some isolates may be susceptible to these agents while testing resistant to cefazolin.      -  Cefepime: R >16      -  Ceftriaxone: R >32      -  Cefuroxime: R >16      -  Ciprofloxacin: R >2      -  Ertapenem: S <=0.5      -  Gentamicin: S <=2      -  Imipenem: S <=1      -  Levofloxacin: R >4      -  Meropenem: S <=1      -  Nitrofurantoin: S <=32 Should not be used to treat pyelonephritis      -  Piperacillin/Tazobactam: S <=8      -  Tobramycin: S <=2      -  Trimethoprim/Sulfamethoxazole: S <=0.5/9.5  Organism: Escherichia coli ESBL (24 Jan 2025 11:54)      Method Type: TOYA      -  Ampicillin: R >16 These ampicillin results predict results for amoxicillin      -  Ampicillin/Sulbactam: R >16/8      -  Aztreonam: R >16      -  Cefazolin: R >16 For uncomplicated UTI with K. pneumoniae, E. coli, or P. mirablis: TOYA <=16 is sensitive and TOYA >=32 is resistant. This also predicts results for oral agents cefaclor, cefdinir, cefpodoxime, cefprozil, cefuroxime axetil, cephalexin and locarbef for uncomplicated UTI. Note that some isolates may be susceptible to these agents while testing resistant to cefazolin.      -  Cefepime: R >16      -  Ceftriaxone: R >32      -  Cefuroxime: R >16      -  Ciprofloxacin: R >2      -  Ertapenem: S <=0.5      -  Gentamicin: S <=2      -  Imipenem: S <=1      -  Levofloxacin: R >4      -  Meropenem: S <=1      -  Nitrofurantoin: S <=32 Should not be used to treat pyelonephritis      -  Piperacillin/Tazobactam: S <=8      -  Tobramycin: S <=2      -  Trimethoprim/Sulfamethoxazole: S <=0.5/9.5    Culture - Blood (collected 27 Jan 2025 06:55)  Source: .Blood BLOOD  Preliminary Report (28 Jan 2025 13:01):    No growth at 24 hours    Culture - Blood (collected 27 Jan 2025 06:51)  Source: .Blood BLOOD  Preliminary Report (28 Jan 2025 13:01):    No growth at 24 hours    Radiology: all available radiological tests reviewed    Advanced directives addressed: full resuscitation

## 2025-01-30 NOTE — PROGRESS NOTE ADULT - SUBJECTIVE AND OBJECTIVE BOX
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 Patient reports she had felt like her usual self since her last discharge until today. She had acute onset subjective fevers, chills, rigors d/t which she came to ED. She was discharged from  on 1/18 with a course of ciprofloxacin which she reports being compliant with, reports having "5 pills" left in the course. She denies any N/V, cough, sore throat, rhinitis, chest pain, rash, diarrhea or dysuria.   In ED patient febrile with Tmax of 101.9, Soft BP in 90s/40-50s, HR in 70s. CBC noted for WBC 21, H/H 5.8/19, Plt 441. CMP noted for BUN/Cr 28/1.04. Lactate 2.5 > 3.7. F/u/COVID negative. UA cloudy, with trace ketones, mod leuks, pos nitrites, 158 wbcs. Guaiac positive. CTA of abdomen and b/l LE negative for acute hemorrhage. CXR read as "grossly clear lungs". Patient met sepsis criteria, cultures obtained and patient recieved broad spectrum antibiotics with vancomycin and ceftriaxone in ED. Patient also recieved 2L NS and ordered for 2 U pRBCs. Patient admitted to  for sepsis and symptomatic anemia.       Subjective: Patient seen and examined.  Patient reports that her lower extremity wound pain is worse today compared to yesterday.  Patient in mild distress due to the pain.  Vascular surgery attempted wound VAC reapplication today however patient refused.   at bedside.  Patient denies any chest pain shortness of breath nausea vomiting diarrhea.  Continue IV antibiotics and antifungal.  WBC is improved to 9.97.  Hemoglobin stable at 8.8.     Additional results/Imaging, I have personally reviewed:    LABS:All other systems reviewed and found to be negative with the exception of what has been described above.    1/29 denies sob, no CP, has r groin drain, post op pain controlled with Iv meds     PHYSICAL EXAM:  Constitutional: NAD, awake and alert, obese, chronically ill appearing   HEENT: PERRLA, EOMI, MMM  Respiratory: Breath sounds are clear bilaterally, No wheezing, rales or rhonchi  Cardiovascular: S1 and S2, RRR, no murmurs, gallops or rubs  Gastrointestinal: +BS, soft, non-tender, non-distended, no CVA tenderness  Extremities: No peripheral edema, +DP pulses b/l  Neurological: A&O x 3, no focal deficitsMusculoskeletal: 5/5 strength b/l upper and lower extremities  Skin: Chronic LE venous stasis changes, +R groin wound foul smelling, skin warm and dry      labs reviewed

## 2025-01-31 LAB
ANION GAP SERPL CALC-SCNC: 3 MMOL/L — LOW (ref 5–17)
BASOPHILS # BLD AUTO: 0.07 K/UL — SIGNIFICANT CHANGE UP (ref 0–0.2)
BASOPHILS NFR BLD AUTO: 1.3 % — SIGNIFICANT CHANGE UP (ref 0–2)
BUN SERPL-MCNC: 19 MG/DL — SIGNIFICANT CHANGE UP (ref 7–23)
CALCIUM SERPL-MCNC: 8.9 MG/DL — SIGNIFICANT CHANGE UP (ref 8.5–10.1)
CHLORIDE SERPL-SCNC: 98 MMOL/L — SIGNIFICANT CHANGE UP (ref 96–108)
CO2 SERPL-SCNC: 35 MMOL/L — HIGH (ref 22–31)
CREAT SERPL-MCNC: 0.67 MG/DL — SIGNIFICANT CHANGE UP (ref 0.5–1.3)
EGFR: 91 ML/MIN/1.73M2 — SIGNIFICANT CHANGE UP
EOSINOPHIL # BLD AUTO: 0.4 K/UL — SIGNIFICANT CHANGE UP (ref 0–0.5)
EOSINOPHIL NFR BLD AUTO: 7.5 % — HIGH (ref 0–6)
GLUCOSE SERPL-MCNC: 89 MG/DL — SIGNIFICANT CHANGE UP (ref 70–99)
HCT VFR BLD CALC: 30.9 % — LOW (ref 34.5–45)
HGB BLD-MCNC: 9.1 G/DL — LOW (ref 11.5–15.5)
IMM GRANULOCYTES NFR BLD AUTO: 1.1 % — HIGH (ref 0–0.9)
LYMPHOCYTES # BLD AUTO: 1 K/UL — SIGNIFICANT CHANGE UP (ref 1–3.3)
LYMPHOCYTES # BLD AUTO: 18.7 % — SIGNIFICANT CHANGE UP (ref 13–44)
MCHC RBC-ENTMCNC: 25.8 PG — LOW (ref 27–34)
MCHC RBC-ENTMCNC: 29.4 G/DL — LOW (ref 32–36)
MCV RBC AUTO: 87.5 FL — SIGNIFICANT CHANGE UP (ref 80–100)
MONOCYTES # BLD AUTO: 0.55 K/UL — SIGNIFICANT CHANGE UP (ref 0–0.9)
MONOCYTES NFR BLD AUTO: 10.3 % — SIGNIFICANT CHANGE UP (ref 2–14)
NEUTROPHILS # BLD AUTO: 3.28 K/UL — SIGNIFICANT CHANGE UP (ref 1.8–7.4)
NEUTROPHILS NFR BLD AUTO: 61.1 % — SIGNIFICANT CHANGE UP (ref 43–77)
PLATELET # BLD AUTO: 345 K/UL — SIGNIFICANT CHANGE UP (ref 150–400)
POTASSIUM SERPL-MCNC: 3.8 MMOL/L — SIGNIFICANT CHANGE UP (ref 3.5–5.3)
POTASSIUM SERPL-SCNC: 3.8 MMOL/L — SIGNIFICANT CHANGE UP (ref 3.5–5.3)
RBC # BLD: 3.53 M/UL — LOW (ref 3.8–5.2)
RBC # FLD: 16.9 % — HIGH (ref 10.3–14.5)
SODIUM SERPL-SCNC: 136 MMOL/L — SIGNIFICANT CHANGE UP (ref 135–145)
WBC # BLD: 5.36 K/UL — SIGNIFICANT CHANGE UP (ref 3.8–10.5)
WBC # FLD AUTO: 5.36 K/UL — SIGNIFICANT CHANGE UP (ref 3.8–10.5)

## 2025-01-31 PROCEDURE — 99233 SBSQ HOSP IP/OBS HIGH 50: CPT

## 2025-01-31 RX ORDER — ZOLPIDEM TARTRATE 5 MG/1
5 TABLET, COATED ORAL AT BEDTIME
Refills: 0 | Status: DISCONTINUED | OUTPATIENT
Start: 2025-01-31 | End: 2025-02-04

## 2025-01-31 RX ADMIN — Medication 1 TABLET(S): at 11:22

## 2025-01-31 RX ADMIN — MEROPENEM 1000 MILLIGRAM(S): 500 INJECTION INTRAVENOUS at 06:07

## 2025-01-31 RX ADMIN — HYDROMORPHONE HYDROCHLORIDE 0.5 MILLIGRAM(S): 4 INJECTION, SOLUTION INTRAMUSCULAR; INTRAVENOUS; SUBCUTANEOUS at 23:24

## 2025-01-31 RX ADMIN — MEROPENEM 1000 MILLIGRAM(S): 500 INJECTION INTRAVENOUS at 16:32

## 2025-01-31 RX ADMIN — Medication 0.5 MILLIGRAM(S): at 01:13

## 2025-01-31 RX ADMIN — ZOLPIDEM TARTRATE 5 MILLIGRAM(S): 5 TABLET, COATED ORAL at 22:23

## 2025-01-31 RX ADMIN — PANTOPRAZOLE 40 MILLIGRAM(S): 20 TABLET, DELAYED RELEASE ORAL at 16:41

## 2025-01-31 RX ADMIN — Medication 75 MILLIGRAM(S): at 11:23

## 2025-01-31 RX ADMIN — HYDROMORPHONE HYDROCHLORIDE 0.5 MILLIGRAM(S): 4 INJECTION, SOLUTION INTRAMUSCULAR; INTRAVENOUS; SUBCUTANEOUS at 11:22

## 2025-01-31 RX ADMIN — POLYETHYLENE GLYCOL 3350 17 GRAM(S): 17 POWDER, FOR SOLUTION ORAL at 11:30

## 2025-01-31 RX ADMIN — RIVAROXABAN 20 MILLIGRAM(S): 20 TABLET, FILM COATED ORAL at 11:22

## 2025-01-31 RX ADMIN — CASPOFUNGIN ACETATE 260 MILLIGRAM(S): 5 INJECTION, POWDER, LYOPHILIZED, FOR SOLUTION INTRAVENOUS at 11:24

## 2025-01-31 RX ADMIN — Medication 2 TABLET(S): at 22:23

## 2025-01-31 RX ADMIN — Medication 40 MILLIGRAM(S): at 11:22

## 2025-01-31 RX ADMIN — MEROPENEM 1000 MILLIGRAM(S): 500 INJECTION INTRAVENOUS at 22:23

## 2025-01-31 RX ADMIN — MONTELUKAST SODIUM 10 MILLIGRAM(S): 5 TABLET, CHEWABLE ORAL at 22:23

## 2025-01-31 RX ADMIN — Medication 100 MILLIGRAM(S): at 11:23

## 2025-01-31 RX ADMIN — AMIODARONE HYDROCHLORIDE 200 MILLIGRAM(S): 50 INJECTION, SOLUTION INTRAVENOUS at 11:23

## 2025-01-31 RX ADMIN — HYDROMORPHONE HYDROCHLORIDE 0.5 MILLIGRAM(S): 4 INJECTION, SOLUTION INTRAMUSCULAR; INTRAVENOUS; SUBCUTANEOUS at 16:31

## 2025-01-31 RX ADMIN — PANTOPRAZOLE 40 MILLIGRAM(S): 20 TABLET, DELAYED RELEASE ORAL at 06:07

## 2025-01-31 RX ADMIN — ATORVASTATIN CALCIUM 10 MILLIGRAM(S): 80 TABLET, FILM COATED ORAL at 22:23

## 2025-01-31 NOTE — PROGRESS NOTE ADULT - ASSESSMENT
74-year-old Female with h/o DM type 2, TAVR 8/2024 on Plavix , A-fib on Xarelto, CHF, HLD, PVD, b/l LE venous insufficiency, bilateral CFA stenoses s/p Rt SFA endarterectomy on 12/17/24, recently hospitalized on 1/3 with right inguinal cellulitis/ postsurgical infection s/p right SFA endarterectomy with underlying inguinal collection treated with vancomycin 750 mg IV q12h, cefepime 2 gm IV q12h and metronidazole 500 mg IV q8h, then PO abx with PO cipro was admitted on 1/22 fever to Tmax of 101.9F, Soft BP in 90s/40-50s. Patient met sepsis criteria, cultures obtained and patient received vancomycin IV and ceftriaxone, then meropenem.    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 UTI with ESBL E.coli  #PVD  #Allergy to PCN  -cultures reviewed  -on meropenem 1 gm IV q8h and caspofungin 50 mg IV qd # 8  -tolerating abx well so far; no side effects noted  -monitor closely in ej of PCN allergy history  -surgical evaluation appreciated - s/p washout  -local wound care  -cardiology evaluation appreciated   -no new surgical cultures available  -continue abx coverage   -repeat BC x 2 noted   -plan for longer term IV abx therapy  -will need PICC line and outpatient abx therapy for 42 more days  -f/u cultures  -monitor temps  -f/u CBC  -supportive care  2. Other issues:   -care per medicine    d/w medicine team    The patient may need prolonged IV antimicrobial therapy

## 2025-01-31 NOTE — PROGRESS NOTE ADULT - ASSESSMENT
74-year-old F, with PMHx of DM, TAVR 8/2024 on Plavix , A-fib on Xarelto, CHF, HLD, PVD, b/l LE venous insufficiency, bilateral CFA stenoses s/p Rt SFA endarterectomy on 12/17/24 with R groin surgical site infection.     #S/p R groin I and D with complex wound closure, POD#3      PLAN  -Switched to plavix and Xarelto   -Monitor drain output  -Cont abx   -PT, OOBTC, encourage ambulation as tolerated  - Cards: deferred ASHLEY for now;  -Rest of care per primary team

## 2025-01-31 NOTE — PROGRESS NOTE ADULT - ATTENDING COMMENTS
seen and examined POD#3 from right groin I&D with complex wound closure again during surgery no involvement of vessels or patch abscess cavity with no residual purulence and all in the subcutaneous space copiously irrigated and closed over a drain; she remains afebrile not toxic with no leukocytosis  -I reached out to ID to better discuss abx plan but was unsuccessful  -I discussed my thoughts with Dr. Colin   -continue provena vac dressing until [place]; continue BELGICA drain until monday will take down both to assess wound on Monday 2/3/25  -encourage out of bed and work with PT   -RLE foot wounds all nearly healed at this point
seen and examined s/p wound washout and debridement with complex closure; at this point patient DOES NOT HAVE open wound; during operation there was no purulence or residual abscess or collection  again to be clear when patient arrived on readmission she had a wound vac on that was protective of her skin and open wound; on CT the collection though more organized was significantly smaller than initial readmission (at that time we drained a SEROMA that came back negative on all cultures; furthermore the only positive culture was from open skin and likely a contaminant); in the ER the vac was removed to assess wound now exposing it to surrounding tissues  we attempted to replace vac dressing but patient refused at that point patient was not very mobile and had a ESBL UTI; after 5 days with wound exposed she started showing signs of induration and erythema; an abscess with drained bedside (of note the abscess was in MEDIAL THIGH NOT NEAR VESSELS) that is what prompted tack back to OR to ensure there is not femoral patch involvement its bovine pericardium but still rarely can get infected; our operative findings were medial abscess in the subcutaneous space; the wound in superior aspect did NOT track to vessels the fascia was well healed so we chose to debride and close    the patch is not involved and there is no deep SSI     continue pain control  provena vac reinforced not really draining anything BELGICA serosanguinous  PT eval and ecourage out of bed  will remove provena on POD 5/6
seen and examined wound is pink and viable minimal granulation tissue  will apply wound vac  close monitoring
seen and examined no fevers or chills feels overall better; all bloodwork improved as well  however on exam extensive errythema probed inferior aspect of wound and drained about 200ml purulent out put; irrigated copiously and packed  she tolerated procedure though had pain    given abscess formation at this point patient needs relatively urgent take back for groin washout exploration muscle flap and likely vein patch repair of femoral artery will plan for tomorrow please make patient NPO and stop heparin gtt in AM (7AM)    will do case with Dr. Rodriguez he was called and plan discussed  spoke with patient's  Wilbert to tell him plan as well

## 2025-01-31 NOTE — PROGRESS NOTE ADULT - SUBJECTIVE AND OBJECTIVE BOX
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 Patient reports she had felt like her usual self since her last discharge until today. She had acute onset subjective fevers, chills, rigors d/t which she came to ED. She was discharged from  on 1/18 with a course of ciprofloxacin which she reports being compliant with, reports having "5 pills" left in the course. She denies any N/V, cough, sore throat, rhinitis, chest pain, rash, diarrhea or dysuria.   In ED patient febrile with Tmax of 101.9, Soft BP in 90s/40-50s, HR in 70s. CBC noted for WBC 21, H/H 5.8/19, Plt 441. CMP noted for BUN/Cr 28/1.04. Lactate 2.5 > 3.7. F/u/COVID negative. UA cloudy, with trace ketones, mod leuks, pos nitrites, 158 wbcs. Guaiac positive. CTA of abdomen and b/l LE negative for acute hemorrhage. CXR read as "grossly clear lungs". Patient met sepsis criteria, cultures obtained and patient recieved broad spectrum antibiotics with vancomycin and ceftriaxone in ED. Patient also recieved 2L NS and ordered for 2 U pRBCs. Patient admitted to  for sepsis and symptomatic anemia.       Subjective: Patient seen and examined.  Patient reports that her lower extremity wound pain is worse today compared to yesterday.  Patient in mild distress due to the pain.  Vascular surgery attempted wound VAC reapplication today however patient refused.   at bedside.  Patient denies any chest pain shortness of breath nausea vomiting diarrhea.  Continue IV antibiotics and antifungal.  WBC is improved to 9.97.  Hemoglobin stable at 8.8.     Additional results/Imaging, I have personally reviewed:    LABS:All other systems reviewed and found to be negative with the exception of what has been described above.    1/31 denies sob, no CP, has r groin drain, post op pain controlled with Iv meds     PHYSICAL EXAM:  Constitutional: NAD, awake and alert, obese, chronically ill appearing   HEENT: PERRLA, EOMI, MMM  Respiratory: Breath sounds are clear bilaterally, No wheezing, rales or rhonchi  Cardiovascular: S1 and S2, RRR, no murmurs, gallops or rubs  Gastrointestinal: +BS, soft, non-tender, non-distended, no CVA tenderness  Extremities: No peripheral edema, +DP pulses b/l  Neurological: A&O x 3, no focal deficitsMusculoskeletal: 5/5 strength b/l upper and lower extremities  Skin: Chronic LE venous stasis changes, +R groin wound foul smelling, skin warm and dry  labs reviewed

## 2025-01-31 NOTE — PROGRESS NOTE ADULT - SUBJECTIVE AND OBJECTIVE BOX
Date of service: 01-31-25 @ 12:08    Lying in bed in NAD  Weak looking  No further fever  Has right groin tenderness    ROS: no fever or chills; denies dizziness, no HA, no SOB or cough, no abdominal pain, no diarrhea or constipation; no dysuria, no legs pain, no rashes    MEDICATIONS  (STANDING):  aMIOdarone    Tablet 200 milliGRAM(s) Oral daily  atorvastatin 10 milliGRAM(s) Oral at bedtime  caspofungin IVPB 50 milliGRAM(s) IV Intermittent every 24 hours  caspofungin IVPB      clopidogrel Tablet 75 milliGRAM(s) Oral daily  furosemide    Tablet 40 milliGRAM(s) Oral daily  influenza  Vaccine (HIGH DOSE) 0.5 milliLiter(s) IntraMuscular once  meropenem Injectable 1000 milliGRAM(s) IV Push every 8 hours  montelukast 10 milliGRAM(s) Oral at bedtime  multivitamin/minerals 1 Tablet(s) Oral daily  naloxone Injectable 0.4 milliGRAM(s) IV Push once  pantoprazole  Injectable 40 milliGRAM(s) IV Push every 12 hours  polyethylene glycol 3350 17 Gram(s) Oral daily  rivaroxaban 20 milliGRAM(s) Oral daily  senna 2 Tablet(s) Oral at bedtime  thiamine 100 milliGRAM(s) Oral daily    Vital Signs Last 24 Hrs  T(C): 36.6 (31 Jan 2025 07:51), Max: 36.6 (31 Jan 2025 07:51)  T(F): 97.8 (31 Jan 2025 07:51), Max: 97.8 (31 Jan 2025 07:51)  HR: 62 (31 Jan 2025 11:20) (52 - 62)  BP: 139/49 (31 Jan 2025 11:20) (106/45 - 147/56)  BP(mean): --  RR: 18 (31 Jan 2025 11:20) (18 - 18)  SpO2: 98% (31 Jan 2025 11:20) (96% - 99%)    Parameters below as of 31 Jan 2025 07:51  Patient On (Oxygen Delivery Method): room air     Physical exam:    Constitutional:  No acute distress  HEENT: NC/AT, EOMI, PERRLA, conjunctivae clear; ears and nose atraumatic; pharynx benign  Neck: supple; thyroid not palpable  Back: no tenderness  Respiratory: respiratory effort normal; clear to auscultation  Cardiovascular: S1S2 regular, no murmurs  Abdomen: soft, not tender, not distended, positive BS; no liver or spleen organomegaly  Genitourinary: no suprapubic tenderness  Lymphatic: no LN palpable  Musculoskeletal: no muscle tenderness, no joint swelling or tenderness  Extremities: no pedal edema  Right inguinal postsurgical wound dressed  Neurological/ Psychiatric: AxOx3, judgement and insight normal; moving all extremities  Skin: no rashes; no palpable lesions    Labs: reviewed                        9.1    5.36  )-----------( 345      ( 31 Jan 2025 07:55 )             30.9     01-31    136  |  98  |  19  ----------------------------<  89  3.8   |  35[H]  |  0.67    Ca    8.9      31 Jan 2025 07:55                        9.3    6.00  )-----------( 331      ( 29 Jan 2025 07:30 )             31.0     01-29    134[L]  |  99  |  12  ----------------------------<  128[H]  3.7   |  32[H]  |  0.65    Ca    8.8      29 Jan 2025 07:30  Phos  3.4     01-28  Mg     1.6     01-28                        8.9    10.05 )-----------( 306      ( 27 Jan 2025 06:51 )             29.2     01-27    136  |  99  |  8   ----------------------------<  92  3.4[L]   |  31  |  0.62    Ca    9.1      27 Jan 2025 06:51               8.6    12.66 )-----------( 282      ( 25 Jan 2025 03:39 )             27.6     01-25    134[L]  |  104  |  16  ----------------------------<  110[H]  3.8   |  26  |  0.70    Ca    8.1[L]      25 Jan 2025 03:39  Mg     1.7     01-25    TPro  6.0  /  Alb  2.2[L]  /  TBili  0.6  /  DBili  x   /  AST  20  /  ALT  9[L]  /  AlkPhos  54  01-24                        7.9    17.46 )-----------( 286      ( 23 Jan 2025 06:26 )             25.7     01-23    132[L]  |  105  |  25[H]  ----------------------------<  96  4.7   |  22  |  1.21    Ca    8.3[L]      23 Jan 2025 06:26  Phos  4.3     01-23  Mg     1.7     01-23    TPro  6.9  /  Alb  2.6[L]  /  TBili  1.0  /  DBili  x   /  AST  37  /  ALT  9[L]  /  AlkPhos  61  01-23     LIVER FUNCTIONS - ( 23 Jan 2025 06:26 )  Alb: 2.6 g/dL / Pro: 6.9 gm/dL / ALK PHOS: 61 U/L / ALT: 9 U/L / AST: 37 U/L / GGT: x           Urinalysis with Rflx Culture (01-22 @ 11:20)  Urine Appearance: Cloudy  Protein, Urine: Negative mg/dL  Urine Microscopic-Add On (NC) (01-22 @ 11:20)  White Blood Cell - Urine: 158 /HPF  Red Blood Cell - Urine: 1 /HPF    1/8 tissue c/s ENFA and CAGL    Urinalysis with Rflx Culture (collected 22 Jan 2025 11:20)    Culture - Blood (collected 22 Jan 2025 11:20)  Source: .Blood BLOOD  Gram Stain (23 Jan 2025 02:03):    Growth in aerobic bottle: Gram Negative Rods    Growth in anaerobic bottle: Gram Negative Rods  Final Report (24 Jan 2025 11:49):    Growth in aerobic and anaerobic bottles: Escherichia coli ESBL    Direct identification is available within approximately 3-5    hours either by Blood Panel Multiplexed PCR or Direct    MALDI-TOF. Details: [email or web address]  Organism: Blood Culture PCR  Escherichia coli ESBL (24 Jan 2025 11:49)  Organism: Escherichia coli ESBL (24 Jan 2025 11:49)      Method Type: TOYA      -  Ampicillin: R >16 These ampicillin results predict results for amoxicillin      -  Ampicillin/Sulbactam: R 16/8      -  Aztreonam: R >16      -  Cefazolin: R >16      -  Cefepime: R >16      -  Ceftriaxone: R >32      -  Ciprofloxacin: R >2      -  Ertapenem: S <=0.5      -  Gentamicin: S <=2      -  Imipenem: S <=1      -  Levofloxacin: R >4      -  Meropenem: S <=1      -  Piperacillin/Tazobactam: R <=8      -  Tobramycin: S <=2      -  Trimethoprim/Sulfamethoxazole: S <=0.5/9.5  Organism: Blood Culture PCR (24 Jan 2025 11:49)      Method Type: PCR      -  Escherichia coli: Detec      -  ESBL: Detec      -  CTX-M Resistance Marker: Detec    Culture - Urine (collected 22 Jan 2025 11:20)  Source: Catheterized None  Final Report (24 Jan 2025 11:54):    >100,000 CFU/ml Escherichia coli ESBL    >100,000 CFU/ml Escherichia coli ESBL #2    Multiple Morphological Strains  Organism: Escherichia coli ESBL  Escherichia coli ESBL (24 Jan 2025 11:54)  Organism: Escherichia coli ESBL (24 Jan 2025 11:54)      Method Type: TOYA      -  Ampicillin: R >16 These ampicillin results predict results for amoxicillin      -  Ampicillin/Sulbactam: I 16/8      -  Aztreonam: R >16      -  Cefazolin: R >16 For uncomplicated UTI with K. pneumoniae, E. coli, or P. mirablis: TOYA <=16 is sensitive and TOYA >=32 is resistant. This also predicts results for oral agents cefaclor, cefdinir, cefpodoxime, cefprozil, cefuroxime axetil, cephalexin and locarbef for uncomplicated UTI. Note that some isolates may be susceptible to these agents while testing resistant to cefazolin.      -  Cefepime: R >16      -  Ceftriaxone: R >32      -  Cefuroxime: R >16      -  Ciprofloxacin: R >2      -  Ertapenem: S <=0.5      -  Gentamicin: S <=2      -  Imipenem: S <=1      -  Levofloxacin: R >4      -  Meropenem: S <=1      -  Nitrofurantoin: S <=32 Should not be used to treat pyelonephritis      -  Piperacillin/Tazobactam: S <=8      -  Tobramycin: S <=2      -  Trimethoprim/Sulfamethoxazole: S <=0.5/9.5  Organism: Escherichia coli ESBL (24 Jan 2025 11:54)      Method Type: TOYA      -  Ampicillin: R >16 These ampicillin results predict results for amoxicillin      -  Ampicillin/Sulbactam: R >16/8      -  Aztreonam: R >16      -  Cefazolin: R >16 For uncomplicated UTI with K. pneumoniae, E. coli, or P. mirablis: TOYA <=16 is sensitive and TOYA >=32 is resistant. This also predicts results for oral agents cefaclor, cefdinir, cefpodoxime, cefprozil, cefuroxime axetil, cephalexin and locarbef for uncomplicated UTI. Note that some isolates may be susceptible to these agents while testing resistant to cefazolin.      -  Cefepime: R >16      -  Ceftriaxone: R >32      -  Cefuroxime: R >16      -  Ciprofloxacin: R >2      -  Ertapenem: S <=0.5      -  Gentamicin: S <=2      -  Imipenem: S <=1      -  Levofloxacin: R >4      -  Meropenem: S <=1      -  Nitrofurantoin: S <=32 Should not be used to treat pyelonephritis      -  Piperacillin/Tazobactam: S <=8      -  Tobramycin: S <=2      -  Trimethoprim/Sulfamethoxazole: S <=0.5/9.5    Culture - Blood (collected 27 Jan 2025 06:55)  Source: .Blood BLOOD  Preliminary Report (28 Jan 2025 13:01):    No growth at 24 hours    Culture - Blood (collected 27 Jan 2025 06:51)  Source: .Blood BLOOD  Preliminary Report (28 Jan 2025 13:01):    No growth at 24 hours    Radiology: all available radiological tests reviewed    Advanced directives addressed: full resuscitation

## 2025-01-31 NOTE — PROGRESS NOTE ADULT - ASSESSMENT
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 Patient reports she had felt like her usual self since her last discharge until today. She had acute onset subjective fevers, chills, rigors d/t which she came to ED. In ED patient febrile with Tmax of 101.9, Soft BP in 90s/40-50s, HR in 70s. CBC noted for WBC 21, H/H 5.8/19. Admitted for:    #Sepsis 2/2 ESBL E. Coli bacteremia , ESBL E. Coli UTI#Right inguinal postsurgical bacterial and fungal infection s/p right SFA endarterectomy s/p collection with ENFA and CAGL  - Consulted ICU in ED, patient deemed stable for tele floor  - Tmax 101.9F, WBC 21 -> 14, lactate 3.7 -> 1.6  - RVP negative, CXR clear   Meropenem and caspofungin, repeat OR cultures today, s/p R groin I & D wash out 1/28, has drain   dw vascular drain to be removed sunday and plan for dc monday  - CT RLE as above- ID consulted , added caspofungin for recent wound cx   - vanco stopped per ID  - Started IV meropenem 100mg q8h and caspofungin 50 mg IV QD on 1/23 (Day 4)   - BCx and UCx 1/22 both growing ESBL E. Coli, etiology of bacteremia likely from UTI Cardiology consulted, feel less likely presentation c/f endocarditis, holding off on ASHLEY at this time- Vascular surgery following Dr. Manjarrez   - Wound vac d/c'd per vascular, continue wet to dry dressings daily- restart wound vac when available   -Patient declined wound vac placement today  - tentative plan for OR on Tuesday for R. groin wound wash out, holding xarelto/plavix at this time, c/w hep gtt in anticipation of procedure  - PRN oxycodone for pain controlWill escalate pain management with IV dilaudid 0.5mg q5 for severe pain     #Acute blood loss anemia 2/2 GIB   - Patient on Xarelto for Afib, with guaiac positive test in ED  - CTA abd/pelvis negative for acute bleed  - C/w protonix BID  - Hgb 5.8 4 units totol pRBC- 8.8 today and stable   - Placed on heparin gtt to determine if can tolerate AC (xarelto/plavix remain held)   - Hold her metoprolol, cardizem, and aldactone d/t soft BP, resume when appropriate   - GI consulted, no current plan for scope at this time   - Continue to trend H/H    #Chronic heart failure with preserved ejection fraction (HFpEF)  #AS s/p TAVR  -Last ECHO done on 12/05/2024: LVEF 56%, LV cavity moderately dilated, normal wall thickness, normal LVEF, severe (grade 3) LV diastolic dysfunction, severe LA dilatation, mod to moderate RA dilatation, normal RV size and function.   -C/w her lasix, hold her metoprolol and aldactone d/t ABLA and soft BP     -Monitor weights, strict ins and outs    .#paroxsymal Atrial fibrillation   #Elevated troponin   - In sinus rhythm here. - C/w her amiodarone.   - Holding Cardizem, metoprolol and xarelto d/t soft BP and ABLA, Resume when appropriate   on heparin drip currently- Troponin trend as above  - Cardiology following Dr Sivan Gasca     #DM2 (diabetes mellitus)    - Last A1c 5.8 (4/2024)  - Not on any medications.   - Carb restricted diet when appropriate.#HLD (hyperlipidemia)   - on home statin and Zetia  - C/w her statin.    # Insomnia.  - Ambien PRN    #Anxiety - trial PO ativan 0.5mg PRN for anxiety   #DVT ppx   - SCDs. heparin gtt  Holding Xarelto as above in anticipation of procedure     dispo tele sinus ankush 50-60 no events , not in acute heart failure, medically no acute contraindications for OR by vascular for wash out r angie, i nadege gasca  total time spent 55mins

## 2025-01-31 NOTE — PROGRESS NOTE ADULT - SUBJECTIVE AND OBJECTIVE BOX
SURGERY DAILY PROGRESS NOTE:     Subjective:  Patient seen and examined at bedside during am rounds. AVSS. Denies any fevers, chills, n/v/d, chest pain or shortness of breath    Objective:    MEDICATIONS  (STANDING):  aMIOdarone    Tablet 200 milliGRAM(s) Oral daily  atorvastatin 10 milliGRAM(s) Oral at bedtime  caspofungin IVPB 50 milliGRAM(s) IV Intermittent every 24 hours  caspofungin IVPB      furosemide    Tablet 40 milliGRAM(s) Oral daily  heparin  Infusion. 2000 Unit(s)/Hr (20 mL/Hr) IV Continuous <Continuous>  influenza  Vaccine (HIGH DOSE) 0.5 milliLiter(s) IntraMuscular once  meropenem Injectable 1000 milliGRAM(s) IV Push every 8 hours  montelukast 10 milliGRAM(s) Oral at bedtime  multivitamin/minerals 1 Tablet(s) Oral daily  naloxone Injectable 0.4 milliGRAM(s) IV Push once  pantoprazole  Injectable 40 milliGRAM(s) IV Push every 12 hours  polyethylene glycol 3350 17 Gram(s) Oral daily  senna 2 Tablet(s) Oral at bedtime  thiamine 100 milliGRAM(s) Oral daily    MEDICATIONS  (PRN):  acetaminophen     Tablet .. 650 milliGRAM(s) Oral every 6 hours PRN Temp greater or equal to 38C (100.4F)  aluminum hydroxide/magnesium hydroxide/simethicone Suspension 30 milliLiter(s) Oral every 4 hours PRN Dyspepsia  artificial  tears Solution 1 Drop(s) Both EYES every 2 hours PRN Dry Eyes  bisacodyl 5 milliGRAM(s) Oral daily PRN Constipation  heparin   Injectable 8500 Unit(s) IV Push every 6 hours PRN For aPTT less than 40  heparin   Injectable 4000 Unit(s) IV Push every 6 hours PRN For aPTT between 40 - 57  HYDROmorphone  Injectable 0.5 milliGRAM(s) IV Push every 4 hours PRN Severe Pain (7 - 10)  LORazepam     Tablet 0.5 milliGRAM(s) Oral two times a day PRN Anxiety  melatonin 3 milliGRAM(s) Oral at bedtime PRN Insomnia  ondansetron Injectable 4 milliGRAM(s) IV Push every 6 hours PRN Nausea and/or Vomiting  oxyCODONE    IR 5 milliGRAM(s) Oral every 6 hours PRN Moderate Pain (4 - 6)  zolpidem 5 milliGRAM(s) Oral at bedtime PRN Insomnia      Vital Signs Last 24 Hrs  T(C): 37.2 (2025 00:14), Max: 37.2 (2025 00:14)  T(F): 99 (2025 00:14), Max: 99 (2025 00:14)  HR: 60 (2025 00:14) (55 - 60)  BP: 107/52 (2025 00:14) (107/52 - 127/57)  BP(mean): 76 (2025 08:40) (76 - 76)  RR: 18 (2025 00:14) (18 - 18)  SpO2: 98% (2025 00:14) (97% - 98%)    Parameters below as of 2025 00:14  Patient On (Oxygen Delivery Method): room air          PHYSICAL EXAM     Gen: well-appearing, in no acute distress  CV: pulse regularly present   Resp: airway patent, non-labored breathing  Abd: soft, NTND; no rebound or guarding. R groin incision with prevena holding suction. BELGICA drain in situ with SS output  Ext. no cyanosis or edema      I&O's Detail    2025 07:01  -  2025 07:00  --------------------------------------------------------  IN:    Other (mL): 500 mL  Total IN: 500 mL    OUT:    Drain (mL): 25 mL  Total OUT: 25 mL    Total NET: 475 mL      2025 07:01  -  2025 01:11  --------------------------------------------------------  IN:  Total IN: 0 mL    OUT:    Voided (mL): 100 mL  Total OUT: 100 mL    Total NET: -100 mL          Daily     Daily Weight in k.2 (2025 05:24)    LABS:                        9.2    6.57  )-----------( 345      ( 2025 20:11 )             30.8         134[L]  |  99  |  12  ----------------------------<  128[H]  3.7   |  32[H]  |  0.65    Ca    8.8      2025 07:30  Phos  3.4       Mg     1.6           PT/INR - ( 2025 05:27 )   PT: 14.1 sec;   INR: 1.23 ratio         PTT - ( 2025 20:11 )  PTT:85.2 sec  Urinalysis Basic - ( 2025 07:30 )    Color: x / Appearance: x / SG: x / pH: x  Gluc: 128 mg/dL / Ketone: x  / Bili: x / Urobili: x   Blood: x / Protein: x / Nitrite: x   Leuk Esterase: x / RBC: x / WBC x   Sq Epi: x / Non Sq Epi: x / Bacteria: x        RADIOLOGY & ADDITIONAL STUDIES:    ASSESSMENT/PLAN:

## 2025-02-01 LAB
ANION GAP SERPL CALC-SCNC: 1 MMOL/L — LOW (ref 5–17)
BASOPHILS # BLD AUTO: 0.04 K/UL — SIGNIFICANT CHANGE UP (ref 0–0.2)
BASOPHILS NFR BLD AUTO: 0.8 % — SIGNIFICANT CHANGE UP (ref 0–2)
BUN SERPL-MCNC: 17 MG/DL — SIGNIFICANT CHANGE UP (ref 7–23)
CALCIUM SERPL-MCNC: 9.2 MG/DL — SIGNIFICANT CHANGE UP (ref 8.5–10.1)
CHLORIDE SERPL-SCNC: 99 MMOL/L — SIGNIFICANT CHANGE UP (ref 96–108)
CO2 SERPL-SCNC: 34 MMOL/L — HIGH (ref 22–31)
CREAT SERPL-MCNC: 0.65 MG/DL — SIGNIFICANT CHANGE UP (ref 0.5–1.3)
CULTURE RESULTS: SIGNIFICANT CHANGE UP
CULTURE RESULTS: SIGNIFICANT CHANGE UP
EGFR: 92 ML/MIN/1.73M2 — SIGNIFICANT CHANGE UP
EOSINOPHIL # BLD AUTO: 0.37 K/UL — SIGNIFICANT CHANGE UP (ref 0–0.5)
EOSINOPHIL NFR BLD AUTO: 7.5 % — HIGH (ref 0–6)
GLUCOSE SERPL-MCNC: 106 MG/DL — HIGH (ref 70–99)
HCT VFR BLD CALC: 32.3 % — LOW (ref 34.5–45)
HGB BLD-MCNC: 9.6 G/DL — LOW (ref 11.5–15.5)
IMM GRANULOCYTES NFR BLD AUTO: 1.6 % — HIGH (ref 0–0.9)
LYMPHOCYTES # BLD AUTO: 0.94 K/UL — LOW (ref 1–3.3)
LYMPHOCYTES # BLD AUTO: 19.1 % — SIGNIFICANT CHANGE UP (ref 13–44)
MCHC RBC-ENTMCNC: 25.9 PG — LOW (ref 27–34)
MCHC RBC-ENTMCNC: 29.7 G/DL — LOW (ref 32–36)
MCV RBC AUTO: 87.1 FL — SIGNIFICANT CHANGE UP (ref 80–100)
MONOCYTES # BLD AUTO: 0.53 K/UL — SIGNIFICANT CHANGE UP (ref 0–0.9)
MONOCYTES NFR BLD AUTO: 10.8 % — SIGNIFICANT CHANGE UP (ref 2–14)
NEUTROPHILS # BLD AUTO: 2.96 K/UL — SIGNIFICANT CHANGE UP (ref 1.8–7.4)
NEUTROPHILS NFR BLD AUTO: 60.2 % — SIGNIFICANT CHANGE UP (ref 43–77)
PLATELET # BLD AUTO: 337 K/UL — SIGNIFICANT CHANGE UP (ref 150–400)
POTASSIUM SERPL-MCNC: 4.2 MMOL/L — SIGNIFICANT CHANGE UP (ref 3.5–5.3)
POTASSIUM SERPL-SCNC: 4.2 MMOL/L — SIGNIFICANT CHANGE UP (ref 3.5–5.3)
RBC # BLD: 3.71 M/UL — LOW (ref 3.8–5.2)
RBC # FLD: 16.9 % — HIGH (ref 10.3–14.5)
SODIUM SERPL-SCNC: 134 MMOL/L — LOW (ref 135–145)
SPECIMEN SOURCE: SIGNIFICANT CHANGE UP
SPECIMEN SOURCE: SIGNIFICANT CHANGE UP
WBC # BLD: 4.92 K/UL — SIGNIFICANT CHANGE UP (ref 3.8–10.5)
WBC # FLD AUTO: 4.92 K/UL — SIGNIFICANT CHANGE UP (ref 3.8–10.5)

## 2025-02-01 PROCEDURE — 99233 SBSQ HOSP IP/OBS HIGH 50: CPT

## 2025-02-01 RX ADMIN — RIVAROXABAN 20 MILLIGRAM(S): 20 TABLET, FILM COATED ORAL at 11:39

## 2025-02-01 RX ADMIN — MEROPENEM 1000 MILLIGRAM(S): 500 INJECTION INTRAVENOUS at 21:30

## 2025-02-01 RX ADMIN — Medication 40 MILLIGRAM(S): at 09:46

## 2025-02-01 RX ADMIN — HYDROMORPHONE HYDROCHLORIDE 0.5 MILLIGRAM(S): 4 INJECTION, SOLUTION INTRAMUSCULAR; INTRAVENOUS; SUBCUTANEOUS at 13:32

## 2025-02-01 RX ADMIN — Medication 100 MILLIGRAM(S): at 09:46

## 2025-02-01 RX ADMIN — HYDROMORPHONE HYDROCHLORIDE 0.5 MILLIGRAM(S): 4 INJECTION, SOLUTION INTRAMUSCULAR; INTRAVENOUS; SUBCUTANEOUS at 00:00

## 2025-02-01 RX ADMIN — Medication 1 TABLET(S): at 09:46

## 2025-02-01 RX ADMIN — HYDROMORPHONE HYDROCHLORIDE 0.5 MILLIGRAM(S): 4 INJECTION, SOLUTION INTRAMUSCULAR; INTRAVENOUS; SUBCUTANEOUS at 23:47

## 2025-02-01 RX ADMIN — PANTOPRAZOLE 40 MILLIGRAM(S): 20 TABLET, DELAYED RELEASE ORAL at 16:23

## 2025-02-01 RX ADMIN — HYDROMORPHONE HYDROCHLORIDE 0.5 MILLIGRAM(S): 4 INJECTION, SOLUTION INTRAMUSCULAR; INTRAVENOUS; SUBCUTANEOUS at 22:47

## 2025-02-01 RX ADMIN — AMIODARONE HYDROCHLORIDE 200 MILLIGRAM(S): 50 INJECTION, SOLUTION INTRAVENOUS at 09:47

## 2025-02-01 RX ADMIN — HYDROMORPHONE HYDROCHLORIDE 0.5 MILLIGRAM(S): 4 INJECTION, SOLUTION INTRAMUSCULAR; INTRAVENOUS; SUBCUTANEOUS at 07:56

## 2025-02-01 RX ADMIN — MEROPENEM 1000 MILLIGRAM(S): 500 INJECTION INTRAVENOUS at 06:17

## 2025-02-01 RX ADMIN — PANTOPRAZOLE 40 MILLIGRAM(S): 20 TABLET, DELAYED RELEASE ORAL at 06:17

## 2025-02-01 RX ADMIN — ZOLPIDEM TARTRATE 5 MILLIGRAM(S): 5 TABLET, COATED ORAL at 21:29

## 2025-02-01 RX ADMIN — Medication 75 MILLIGRAM(S): at 09:47

## 2025-02-01 RX ADMIN — MONTELUKAST SODIUM 10 MILLIGRAM(S): 5 TABLET, CHEWABLE ORAL at 21:30

## 2025-02-01 RX ADMIN — MEROPENEM 1000 MILLIGRAM(S): 500 INJECTION INTRAVENOUS at 16:23

## 2025-02-01 RX ADMIN — ATORVASTATIN CALCIUM 10 MILLIGRAM(S): 80 TABLET, FILM COATED ORAL at 21:29

## 2025-02-01 RX ADMIN — CASPOFUNGIN ACETATE 260 MILLIGRAM(S): 5 INJECTION, POWDER, LYOPHILIZED, FOR SOLUTION INTRAVENOUS at 09:45

## 2025-02-01 RX ADMIN — Medication 2 TABLET(S): at 21:30

## 2025-02-01 NOTE — PROGRESS NOTE ADULT - SUBJECTIVE AND OBJECTIVE BOX
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 Patient reports she had felt like her usual self since her last discharge until today. She had acute onset subjective fevers, chills, rigors d/t which she came to ED. She was discharged from  on 1/18 with a course of ciprofloxacin which she reports being compliant with, reports having "5 pills" left in the course. She denies any N/V, cough, sore throat, rhinitis, chest pain, rash, diarrhea or dysuria.   In ED patient febrile with Tmax of 101.9, Soft BP in 90s/40-50s, HR in 70s. CBC noted for WBC 21, H/H 5.8/19, Plt 441. CMP noted for BUN/Cr 28/1.04. Lactate 2.5 > 3.7. F/u/COVID negative. UA cloudy, with trace ketones, mod leuks, pos nitrites, 158 wbcs. Guaiac positive. CTA of abdomen and b/l LE negative for acute hemorrhage. CXR read as "grossly clear lungs". Patient met sepsis criteria, cultures obtained and patient recieved broad spectrum antibiotics with vancomycin and ceftriaxone in ED. Patient also recieved 2L NS and ordered for 2 U pRBCs. Patient admitted to  for sepsis and symptomatic anemia.       Subjective: Patient seen and examined.  Patient reports that her lower extremity wound pain is worse today compared to yesterday.  Patient in mild distress due to the pain.  Vascular surgery attempted wound VAC reapplication today however patient refused.   at bedside.  Patient denies any chest pain shortness of breath nausea vomiting diarrhea.  Continue IV antibiotics and antifungal.  WBC is improved to 9.97.  Hemoglobin stable at 8.8.     Additional results/Imaging, I have personally reviewed:    LABS:All other systems reviewed and found to be negative with the exception of what has been described above.    1/31 denies sob, no CP, has r groin drain, post op pain controlled with Iv meds     PHYSICAL EXAM:  Constitutional: NAD, awake and alert, obese, chronically ill appearing   HEENT: PERRLA, EOMI, MMM  Respiratory: Breath sounds are clear bilaterally, No wheezing, rales or rhonchi  Cardiovascular: S1 and S2, RRR, no murmurs, gallops or rubs  Gastrointestinal: +BS, soft, non-tender, non-distended, no CVA tenderness  Extremities: No peripheral edema, +DP pulses b/l  Neurological: A&O x 3, no focal deficitsMusculoskeletal: 5/5 strength b/l upper and lower extremities  Skin: Chronic LE venous stasis changes, +R groin wound foul smelling, skin warm and drylabs reviewed

## 2025-02-01 NOTE — PROGRESS NOTE ADULT - ASSESSMENT
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 Patient reports she had felt like her usual self since her last discharge until today. She had acute onset subjective fevers, chills, rigors d/t which she came to ED. In ED patient febrile with Tmax of 101.9, Soft BP in 90s/40-50s, HR in 70s. CBC noted for WBC 21, H/H 5.8/19. Admitted for:    #Sepsis 2/2 ESBL E. Coli bacteremia , ESBL E. Coli UTI#Right inguinal postsurgical bacterial and fungal infection s/p right SFA endarterectomy s/p collection with ENFA and CAGL  - Consulted ICU in ED, patient deemed stable for tele floor Tmax 101.9F, WBC 21 -> 14, lactate 3.7 -> 1.6  - RVP negative, CXR clear   Meropenem and caspofungin,  s/p R groin I & D wash out 1/28, has drain   dw vascular drain to be removed sunday and plan for dc monday pending Dr gill discussion with vascular  - CT RLE as above- ID consulted , added caspofungin for recent wound cx   - vanco stopped per ID  - Started IV meropenem 100mg q8h and caspofungin 50 mg IV QD on 1/23 (Day 4)   - BCx and UCx 1/22 both growing ESBL E. Coli, etiology of bacteremia likely from UTI Cardiology consulted, feel less likely presentation c/f endocarditis, holding off on ASHLEY at this time- Vascular surgery following Dr. Manjarrez   - Wound vac d/c'd per vascular, continue wet to dry dressings daily- restart wound vac when available   -Patient declined wound vac placement today  - tentative plan for OR on Tuesday for R. groin wound wash out, holding xarelto/plavix at this time, c/w hep gtt in anticipation of procedure  - PRN oxycodone for pain controlWill escalate pain management with IV dilaudid 0.5mg q5 for severe pain     #Acute blood loss anemia 2/2 GIB   - Patient on Xarelto for Afib, with guaiac positive test in ED  - CTA abd/pelvis negative for acute bleed  - C/w protonix BID  - Hgb 5.8 4 units totol pRBC- 8.8 today and stable   - Placed on heparin gtt to determine if can tolerate AC (xarelto/plavix remain held)   - Hold her metoprolol, cardizem, and aldactone d/t soft BP, resume when appropriate   - GI consulted, no current plan for scope at this time   - Continue to trend H/H    #Chronic heart failure with preserved ejection fraction (HFpEF)  #AS s/p TAVR  -Last ECHO done on 12/05/2024: LVEF 56%, LV cavity moderately dilated, normal wall thickness, normal LVEF, severe (grade 3) LV diastolic dysfunction, severe LA dilatation, mod to moderate RA dilatation, normal RV size and function. -C/w her lasix, hold her metoprolol and aldactone d/t ABLA and soft BP     -Monitor weights, strict ins and outs    .#paroxsymal Atrial fibrillation   #Elevated troponin   - In sinus rhythm here. - C/w her amiodarone.   - Holding Cardizem, metoprolol and xarelto d/t soft BP and ABLA, Resume when appropriate   on heparin drip currently- Troponin trend as above  - Cardiology following Dr Sivan Jimenez     #DM2 (diabetes mellitus)    - Last A1c 5.8 (4/2024)  - Not on any medications.   - Carb restricted diet when appropriate.#HLD (hyperlipidemia)   - on home statin and Zetia  - C/w her statin.    # Insomnia.  - Ambien PRN    #Anxiety - trial PO ativan 0.5mg PRN for anxiety   #DVT ppx   - SCDs. heparin gtt  Holding Xarelto as above in anticipation of procedure   dispo  awaiting ID to f/u for duration of caspofungin and would likely need Piccline for subacute endocarditis as unable to rule it out  total time spent 55mins

## 2025-02-01 NOTE — PROGRESS NOTE ADULT - SUBJECTIVE AND OBJECTIVE BOX
SURGERY DAILY PROGRESS NOTE:     Subjective:  Patient seen and examined at bedside during am rounds. AVSS. Denies any fevers, chills, n/v/d, chest pain or shortness of breath    Objective:    MEDICATIONS  (STANDING):  aMIOdarone    Tablet 200 milliGRAM(s) Oral daily  atorvastatin 10 milliGRAM(s) Oral at bedtime  caspofungin IVPB 50 milliGRAM(s) IV Intermittent every 24 hours  caspofungin IVPB      clopidogrel Tablet 75 milliGRAM(s) Oral daily  furosemide    Tablet 40 milliGRAM(s) Oral daily  influenza  Vaccine (HIGH DOSE) 0.5 milliLiter(s) IntraMuscular once  meropenem Injectable 1000 milliGRAM(s) IV Push every 8 hours  montelukast 10 milliGRAM(s) Oral at bedtime  multivitamin/minerals 1 Tablet(s) Oral daily  naloxone Injectable 0.4 milliGRAM(s) IV Push once  pantoprazole  Injectable 40 milliGRAM(s) IV Push every 12 hours  polyethylene glycol 3350 17 Gram(s) Oral daily  rivaroxaban 20 milliGRAM(s) Oral daily  senna 2 Tablet(s) Oral at bedtime  thiamine 100 milliGRAM(s) Oral daily    MEDICATIONS  (PRN):  acetaminophen     Tablet .. 650 milliGRAM(s) Oral every 6 hours PRN Temp greater or equal to 38C (100.4F)  aluminum hydroxide/magnesium hydroxide/simethicone Suspension 30 milliLiter(s) Oral every 4 hours PRN Dyspepsia  artificial  tears Solution 1 Drop(s) Both EYES every 2 hours PRN Dry Eyes  bisacodyl 5 milliGRAM(s) Oral daily PRN Constipation  HYDROmorphone  Injectable 0.5 milliGRAM(s) IV Push every 4 hours PRN Severe Pain (7 - 10)  melatonin 3 milliGRAM(s) Oral at bedtime PRN Insomnia  ondansetron Injectable 4 milliGRAM(s) IV Push every 6 hours PRN Nausea and/or Vomiting  zolpidem 5 milliGRAM(s) Oral at bedtime PRN Insomnia      Vital Signs Last 24 Hrs  T(C): 36.7 (2025 01:10), Max: 36.7 (2025 01:10)  T(F): 98.1 (2025 01:10), Max: 98.1 (2025 01:10)  HR: 54 (2025 01:10) (54 - 62)  BP: 127/53 (2025 01:10) (127/53 - 148/52)  BP(mean): 77 (2025 22:20) (77 - 77)  RR: 18 (2025 01:10) (18 - 18)  SpO2: 99% (2025 01:10) (97% - 99%)    Parameters below as of 2025 01:10  Patient On (Oxygen Delivery Method): room air          PHYSICAL EXAM   Gen: well-appearing, in no acute distress  CV: pulse regularly present   Resp: airway patent, non-labored breathing  Abd: soft, NTND; no rebound or guarding. R groin incision with prevena holding suction. BELG[place] drain in situ with SS output  Ext. no cyanosis or edema      I&O's Detail    2025 07:01  -  2025 07:00  --------------------------------------------------------  IN:    IV PiggyBack: 11 mL  Total IN: 11 mL    OUT:    Drain (mL): 60 mL    Voided (mL): 825 mL  Total OUT: 885 mL    Total NET: -874 mL          Daily     Daily Weight in k.2 (2025 08:19)    LABS:                        9.6    4.92  )-----------( 337      ( 2025 06:40 )             32.3         134[L]  |  99  |  17  ----------------------------<  106[H]  4.2   |  34[H]  |  0.65    Ca    9.2      2025 06:40      PTT - ( 2025 12:57 )  PTT:48.3 sec  Urinalysis Basic - ( 2025 06:40 )    Color: x / Appearance: x / SG: x / pH: x  Gluc: 106 mg/dL / Ketone: x  / Bili: x / Urobili: x   Blood: x / Protein: x / Nitrite: x   Leuk Esterase: x / RBC: x / WBC x   Sq Epi: x / Non Sq Epi: x / Bacteria: x        RADIOLOGY & ADDITIONAL STUDIES:    ASSESSMENT/PLAN:

## 2025-02-01 NOTE — PROGRESS NOTE ADULT - ASSESSMENT
74-year-old F, with PMHx of DM, TAVR 8/2024 on Plavix , A-fib on Xarelto, CHF, HLD, PVD, b/l LE venous insufficiency, bilateral CFA stenoses s/p Rt SFA endarterectomy on 12/17/24 with R groin surgical site infection.     #S/p R groin I and D with complex wound closure, POD#4      PLAN  -Continue plavix and Xarelto   -Monitor drain output  -Cont abx   -PT, OOBTC, encourage ambulation as tolerated  - Cards: deferred ASHLEY for now;  -Will discuss antibiotic plan with ID team. Please defer putting PICC line for now  -Rest of care per primary team

## 2025-02-02 LAB
ANION GAP SERPL CALC-SCNC: 4 MMOL/L — LOW (ref 5–17)
BUN SERPL-MCNC: 15 MG/DL — SIGNIFICANT CHANGE UP (ref 7–23)
CALCIUM SERPL-MCNC: 9.2 MG/DL — SIGNIFICANT CHANGE UP (ref 8.5–10.1)
CHLORIDE SERPL-SCNC: 103 MMOL/L — SIGNIFICANT CHANGE UP (ref 96–108)
CO2 SERPL-SCNC: 28 MMOL/L — SIGNIFICANT CHANGE UP (ref 22–31)
CREAT SERPL-MCNC: 0.71 MG/DL — SIGNIFICANT CHANGE UP (ref 0.5–1.3)
EGFR: 89 ML/MIN/1.73M2 — SIGNIFICANT CHANGE UP
GLUCOSE SERPL-MCNC: 93 MG/DL — SIGNIFICANT CHANGE UP (ref 70–99)
HCT VFR BLD CALC: 31.9 % — LOW (ref 34.5–45)
HGB BLD-MCNC: 9.4 G/DL — LOW (ref 11.5–15.5)
MCHC RBC-ENTMCNC: 25.7 PG — LOW (ref 27–34)
MCHC RBC-ENTMCNC: 29.5 G/DL — LOW (ref 32–36)
MCV RBC AUTO: 87.2 FL — SIGNIFICANT CHANGE UP (ref 80–100)
PLATELET # BLD AUTO: 342 K/UL — SIGNIFICANT CHANGE UP (ref 150–400)
POTASSIUM SERPL-MCNC: 4.2 MMOL/L — SIGNIFICANT CHANGE UP (ref 3.5–5.3)
POTASSIUM SERPL-SCNC: 4.2 MMOL/L — SIGNIFICANT CHANGE UP (ref 3.5–5.3)
RBC # BLD: 3.66 M/UL — LOW (ref 3.8–5.2)
RBC # FLD: 16.9 % — HIGH (ref 10.3–14.5)
SODIUM SERPL-SCNC: 135 MMOL/L — SIGNIFICANT CHANGE UP (ref 135–145)
WBC # BLD: 6.13 K/UL — SIGNIFICANT CHANGE UP (ref 3.8–10.5)
WBC # FLD AUTO: 6.13 K/UL — SIGNIFICANT CHANGE UP (ref 3.8–10.5)

## 2025-02-02 PROCEDURE — 99232 SBSQ HOSP IP/OBS MODERATE 35: CPT

## 2025-02-02 RX ADMIN — MONTELUKAST SODIUM 10 MILLIGRAM(S): 5 TABLET, CHEWABLE ORAL at 21:52

## 2025-02-02 RX ADMIN — HYDROMORPHONE HYDROCHLORIDE 0.5 MILLIGRAM(S): 4 INJECTION, SOLUTION INTRAMUSCULAR; INTRAVENOUS; SUBCUTANEOUS at 04:30

## 2025-02-02 RX ADMIN — Medication 2 TABLET(S): at 21:52

## 2025-02-02 RX ADMIN — PANTOPRAZOLE 40 MILLIGRAM(S): 20 TABLET, DELAYED RELEASE ORAL at 06:38

## 2025-02-02 RX ADMIN — AMIODARONE HYDROCHLORIDE 200 MILLIGRAM(S): 50 INJECTION, SOLUTION INTRAVENOUS at 09:17

## 2025-02-02 RX ADMIN — ZOLPIDEM TARTRATE 5 MILLIGRAM(S): 5 TABLET, COATED ORAL at 21:50

## 2025-02-02 RX ADMIN — HYDROMORPHONE HYDROCHLORIDE 0.5 MILLIGRAM(S): 4 INJECTION, SOLUTION INTRAMUSCULAR; INTRAVENOUS; SUBCUTANEOUS at 09:18

## 2025-02-02 RX ADMIN — Medication 75 MILLIGRAM(S): at 09:17

## 2025-02-02 RX ADMIN — ATORVASTATIN CALCIUM 10 MILLIGRAM(S): 80 TABLET, FILM COATED ORAL at 21:52

## 2025-02-02 RX ADMIN — HYDROMORPHONE HYDROCHLORIDE 0.5 MILLIGRAM(S): 4 INJECTION, SOLUTION INTRAMUSCULAR; INTRAVENOUS; SUBCUTANEOUS at 03:30

## 2025-02-02 RX ADMIN — Medication 40 MILLIGRAM(S): at 09:18

## 2025-02-02 RX ADMIN — PANTOPRAZOLE 40 MILLIGRAM(S): 20 TABLET, DELAYED RELEASE ORAL at 14:43

## 2025-02-02 RX ADMIN — RIVAROXABAN 20 MILLIGRAM(S): 20 TABLET, FILM COATED ORAL at 11:11

## 2025-02-02 RX ADMIN — CASPOFUNGIN ACETATE 260 MILLIGRAM(S): 5 INJECTION, POWDER, LYOPHILIZED, FOR SOLUTION INTRAVENOUS at 09:18

## 2025-02-02 RX ADMIN — MEROPENEM 1000 MILLIGRAM(S): 500 INJECTION INTRAVENOUS at 14:43

## 2025-02-02 RX ADMIN — Medication 1 TABLET(S): at 09:17

## 2025-02-02 RX ADMIN — MEROPENEM 1000 MILLIGRAM(S): 500 INJECTION INTRAVENOUS at 21:51

## 2025-02-02 RX ADMIN — POLYETHYLENE GLYCOL 3350 17 GRAM(S): 17 POWDER, FOR SOLUTION ORAL at 16:08

## 2025-02-02 RX ADMIN — Medication 100 MILLIGRAM(S): at 09:18

## 2025-02-02 RX ADMIN — MEROPENEM 1000 MILLIGRAM(S): 500 INJECTION INTRAVENOUS at 05:51

## 2025-02-02 RX ADMIN — HYDROMORPHONE HYDROCHLORIDE 0.5 MILLIGRAM(S): 4 INJECTION, SOLUTION INTRAMUSCULAR; INTRAVENOUS; SUBCUTANEOUS at 23:43

## 2025-02-02 RX ADMIN — HYDROMORPHONE HYDROCHLORIDE 0.5 MILLIGRAM(S): 4 INJECTION, SOLUTION INTRAMUSCULAR; INTRAVENOUS; SUBCUTANEOUS at 16:17

## 2025-02-02 NOTE — PROGRESS NOTE ADULT - ASSESSMENT
75 yo F with PMHx as above presents c/o fevers and chills found to have sepsis 2/2 e. coli bacteremia and non-healing right groin wound s/p washout/drainage of wound.    -Patient's bacteremia is E. Coli which very rarely causes endocarditis, and only seen in immunocompromised patients. She does have diabetes but she does not have CA/on chemo or on medications that reduce her immunity. However cannot completely r/o endocarditis.  -Her echocardiogram shows a well seated TAVR valve and her MV shows moderate amount of calcifications but appear usual for moderate MAC.  -Discussed this with patient, that this infection is unlikely to go to her heart but cannot completely rule it out. She is also stressed about her IV antibiotics since she has no help at home and may need to go to rehab to get the antibiotics. She is not thrilled about going for another procedure such as a ASHLEY and she is an elevated risk for this procedure. Unsure when the next availability would for be a ASHLEY as well, scheduling office closed over the weekend.  -Patient would like to discuss this further with Dr. Mayer who will be rounding tomorrow.  -Continue to tx sepsis as per primary team and ID.

## 2025-02-02 NOTE — PROGRESS NOTE ADULT - ASSESSMENT
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 Patient reports she had felt like her usual self since her last discharge until today. She had acute onset subjective fevers, chills, rigors d/t which she came to ED. In ED patient febrile with Tmax of 101.9, Soft BP in 90s/40-50s, HR in 70s. CBC noted for WBC 21, H/H 5.8/19. Admitted for:    #Sepsis 2/2 ESBL E. Coli bacteremia , ESBL E. Coli UTI#Right inguinal postsurgical bacterial and fungal infection s/p right SFA endarterectomy s/p collection with ENFA and CAGL  - Consulted ICU in ED, patient deemed stable for tele floor Tmax 101.9F, WBC 21 -> 14, lactate 3.7 -> 1.6  - RVP negative, CXR clear Meropenem and caspofungin,  s/p R groin I & D wash out 1/28, has drain   dw vascular drain to be removed sunday and plan for dc monday pending Dr gill discussion with vascular  - CT RLE as above- ID consulted , added caspofungin for recent wound cx   - vanco stopped per ID  - Started IV meropenem 100mg q8h and caspofungin 50 mg IV QD on 1/23 (Day 4)   - BCx and UCx 1/22 both growing ESBL E. Coli, etiology of bacteremia likely from UTI Cardiology consulted, feel less likely presentation c/f endocarditis, holding off on ASHLEY at this time- Vascular surgery following Dr. Manjarrez   - Wound vac d/c'd per vascular, continue wet to dry dressings daily- restart wound vac when available   -Patient declined wound vac placement today  - tentative plan for OR on Tuesday for R. groin wound wash out, holding xarelto/plavix at this time, c/w hep gtt in anticipation of procedure  - PRN oxycodone for pain controlWill escalate pain management with IV dilaudid 0.5mg q5 for severe pain     #Acute blood loss anemia 2/2 GIB   - Patient on Xarelto for Afib, with guaiac positive test in ED  - CTA abd/pelvis negative for acute bleed  - C/w protonix BID  - Hgb 5.8 4 units totol pRBC- 8.8 today and stable   - Placed on heparin gtt to determine if can tolerate AC (xarelto/plavix remain held)   - Hold her metoprolol, cardizem, and aldactone d/t soft BP, resume when appropriate   - GI consulted, no current plan for scope at this time   - Continue to trend H/H    #Chronic heart failure with preserved ejection fraction (HFpEF)  #AS s/p TAVR  -Last ECHO done on 12/05/2024: LVEF 56%, LV cavity moderately dilated, normal wall thickness, normal LVEF, severe (grade 3) LV diastolic dysfunction, severe LA dilatation, mod to moderate RA dilatation, normal RV size and function. -C/w her lasix, hold her metoprolol and aldactone d/t ABLA and soft BP     -Monitor weights, strict ins and outs  .#paroxsymal Atrial fibrillation   #Elevated troponin   - In sinus rhythm here. - C/w her amiodarone.   - Holding Cardizem, metoprolol and xarelto d/t soft BP and ABLA, Resume when appropriate   on heparin drip currently- Troponin trend as above  - Cardiology following Dr Sivan Jimenez     #DM2 (diabetes mellitus)    - Last A1c 5.8 (4/2024)  - Not on any medications.   - Carb restricted diet when appropriate.#HLD (hyperlipidemia)   - on home statin and Zetia  - C/w her statin.    # Insomnia.  - Ambien PRN    #Anxiety - trial PO ativan 0.5mg PRN for anxiety   #DVT ppx   - SCDs. heparin gtt  Holding Xarelto as above in anticipation of procedure   dispo  awaiting ID to f/u for duration of caspofungin and would likely need Piccline for subacute endocarditis as unable to rule it out  total time spent 55mins    74-year-old female with past medical history of DM, TAVR 8/2024 on Plavix , A-fib on Xarelto, CHF, HLD, PVD, b/l LE venous insufficiency, bilateral CFA stenoses s/p Rt SFA endarterectomy on 12/17/24 complicated by postoperative seroma requiring irrigation and debridement and wound VAC placement Seen on January 18 for dislodgment of wound VAC tubing presents for evaluation of fever/chills and rigors since last night. Patient reports she had felt like her usual self since her last discharge until today. She had acute onset subjective fevers, chills, rigors d/t which she came to ED. In ED patient febrile with Tmax of 101.9, Soft BP in 90s/40-50s, HR in 70s. CBC noted for WBC 21, H/H 5.8/19. Admitted for:    #Sepsis 2/2 ESBL E. Coli bacteremia , ESBL E. Coli UTI#Right inguinal postsurgical bacterial and fungal infection s/p right SFA endarterectomy s/p collection with ENFA and CAGL  - Consulted ICU in ED, patient deemed stable for tele floor Tmax 101.9F, WBC 21 -> 14, lactate 3.7 -> 1.6  - RVP negative, CXR clear Meropenem and caspofungin,  s/p R groin I & D wash out 1/28, has drain   dw vascular drain to be removed sunday and plan for dc monday pending Dr gill discussion with vascular  - CT RLE as above- ID consulted , added caspofungin for recent wound cx   - vanco stopped per ID  - Started IV meropenem 100mg q8h and caspofungin 50 mg IV QD on 1/23 (Day 4)   - BCx and UCx 1/22 both growing ESBL E. Coli, etiology of bacteremia likely from UTI Cardiology consulted, feel less likely presentation c/f endocarditis, holding off on ASHLEY at this time- Vascular surgery following Dr. Manjarrez   - Wound vac d/c'd per vascular, continue wet to dry dressings daily- restart wound vac when available   -Patient declined wound vac placement today  - tentative plan for OR on Tuesday for R. groin wound wash out, holding xarelto/plavix at this time, c/w hep gtt in anticipation of procedure  - PRN oxycodone for pain controlWill escalate pain management with IV dilaudid 0.5mg q5 for severe pain     #Acute blood loss anemia 2/2 GIB   - Patient on Xarelto for Afib, with guaiac positive test in ED  - CTA abd/pelvis negative for acute bleed  - C/w protonix BID  - Hgb 5.8 4 units totol pRBC- 8.8 today and stable   - Placed on heparin gtt to determine if can tolerate AC (xarelto/plavix remain held)   - Hold her metoprolol, cardizem, and aldactone d/t soft BP, resume when appropriate   - GI consulted, no current plan for scope at this time   - Continue to trend H/H    #Chronic heart failure with preserved ejection fraction (HFpEF)  #AS s/p TAVR  -Last ECHO done on 12/05/2024: LVEF 56%, LV cavity moderately dilated, normal wall thickness, normal LVEF, severe (grade 3) LV diastolic dysfunction, severe LA dilatation, mod to moderate RA dilatation, normal RV size and function. -C/w her lasix, hold her metoprolol and aldactone d/t ABLA and soft BP     -Monitor weights, strict ins and outs  .#paroxsymal Atrial fibrillation   #Elevated troponin   - In sinus rhythm here. - C/w her amiodarone.   - Holding Cardizem, metoprolol and xarelto d/t soft BP and ABLA, Resume when appropriate  on xarelto Troponin trend as above  - Cardiology following Dr Sivan Jimenez     #DM2 (diabetes mellitus)    - Last A1c 5.8 (4/2024)  - Not on any medications.   - Carb restricted diet when appropriate.#HLD (hyperlipidemia)   - on home statin and Zetia  - C/w her statin.    # Insomnia.  - Ambien PRN    #Anxiety - trial PO ativan 0.5mg PRN for anxiety   #DVT ppx   - SCDs. heparin gtt  Holding Xarelto as above in anticipation of procedure   dispo  awaiting ID to f/u for duration of caspofungin and would likely need Piccline for subacute endocarditis as unable to rule it out  total time spent 55mins

## 2025-02-02 NOTE — PROGRESS NOTE ADULT - SUBJECTIVE AND OBJECTIVE BOX
SURGERY DAILY PROGRESS NOTE:     Subjective:  Patient seen and examined at bedside during am rounds. AVSS. Denies any fevers, chills, n/v/d, chest pain or shortness of breath    Objective:    MEDICATIONS  (STANDING):  aMIOdarone    Tablet 200 milliGRAM(s) Oral daily  atorvastatin 10 milliGRAM(s) Oral at bedtime  caspofungin IVPB      caspofungin IVPB 50 milliGRAM(s) IV Intermittent every 24 hours  clopidogrel Tablet 75 milliGRAM(s) Oral daily  furosemide    Tablet 40 milliGRAM(s) Oral daily  influenza  Vaccine (HIGH DOSE) 0.5 milliLiter(s) IntraMuscular once  meropenem Injectable 1000 milliGRAM(s) IV Push every 8 hours  montelukast 10 milliGRAM(s) Oral at bedtime  multivitamin/minerals 1 Tablet(s) Oral daily  naloxone Injectable 0.4 milliGRAM(s) IV Push once  pantoprazole  Injectable 40 milliGRAM(s) IV Push every 12 hours  polyethylene glycol 3350 17 Gram(s) Oral daily  rivaroxaban 20 milliGRAM(s) Oral daily  senna 2 Tablet(s) Oral at bedtime  thiamine 100 milliGRAM(s) Oral daily    MEDICATIONS  (PRN):  acetaminophen     Tablet .. 650 milliGRAM(s) Oral every 6 hours PRN Temp greater or equal to 38C (100.4F)  aluminum hydroxide/magnesium hydroxide/simethicone Suspension 30 milliLiter(s) Oral every 4 hours PRN Dyspepsia  artificial  tears Solution 1 Drop(s) Both EYES every 2 hours PRN Dry Eyes  bisacodyl 5 milliGRAM(s) Oral daily PRN Constipation  HYDROmorphone  Injectable 0.5 milliGRAM(s) IV Push every 4 hours PRN Severe Pain (7 - 10)  melatonin 3 milliGRAM(s) Oral at bedtime PRN Insomnia  ondansetron Injectable 4 milliGRAM(s) IV Push every 6 hours PRN Nausea and/or Vomiting  zolpidem 5 milliGRAM(s) Oral at bedtime PRN Insomnia      Vital Signs Last 24 Hrs  T(C): 36.4 (2025 08:34), Max: 36.7 (2025 16:32)  T(F): 97.5 (2025 08:34), Max: 98.1 (2025 16:32)  HR: 57 (2025 08:34) (56 - 57)  BP: 155/51 (2025 08:34) (115/59 - 155/51)  BP(mean): --  RR: 18 (2025 08:34) (18 - 19)  SpO2: 99% (2025 08:34) (97% - 99%)    Parameters below as of 2025 08:34  Patient On (Oxygen Delivery Method): room air          PHYSICAL EXAM   Gen: well-appearing, in no acute distress  CV: pulse regularly present   Resp: airway patent, non-labored breathing  Abd: soft, NTND; no rebound or guarding. R groin wound, dressing/packing c/d/i  Ext: no cyanosis or edema      I&O's Detail    2025 07:01  -  2025 07:00  --------------------------------------------------------  IN:  Total IN: 0 mL    OUT:    Drain (mL): 25 mL  Total OUT: 25 mL    Total NET: -25 mL          Daily     Daily Weight in k.3 (2025 00:59)    LABS:                        9.4    6.13  )-----------( 342      ( 2025 06:49 )             31.9     02-02    135  |  103  |  15  ----------------------------<  93  4.2   |  28  |  0.71    Ca    9.2      2025 06:49        Urinalysis Basic - ( 2025 06:49 )    Color: x / Appearance: x / SG: x / pH: x  Gluc: 93 mg/dL / Ketone: x  / Bili: x / Urobili: x   Blood: x / Protein: x / Nitrite: x   Leuk Esterase: x / RBC: x / WBC x   Sq Epi: x / Non Sq Epi: x / Bacteria: x

## 2025-02-02 NOTE — PROGRESS NOTE ADULT - ASSESSMENT
74-year-old F, with PMHx of DM, TAVR 8/2024 on Plavix , A-fib on Xarelto, CHF, HLD, PVD, b/l LE venous insufficiency, bilateral CFA stenoses s/p Rt SFA endarterectomy on 12/17/24 with R groin surgical site infection.     #S/p R groin I and D with complex wound closure      PLAN  -continue plavix and Xarelto   -Monitor drain output  -f/u ID recs for abx coverage  -PT, OOBTC, encourage ambulation as tolerated  -daily dressing change  -Rest of care per primary team      Plan discussed with Dr. Napoles

## 2025-02-02 NOTE — PROGRESS NOTE ADULT - SUBJECTIVE AND OBJECTIVE BOX
CHIEF COMPLAINT:  Patient is a 74y old  Female who presents with a chief complaint of fever/chills    HPI:  74-year-old female with past medical history of DM, TAVR 2024 on Plavix , A-fib on Xarelto, CHF, HLD, PVD, b/l LE venous insufficiency, bilateral CFA stenoses s/p Rt SFA endarterectomy on 24 complicated by postoperative seroma requiring irrigation and debridement and wound VAC placement Seen on  for dislodgment of wound VAC tubing presents for evaluation of fever/chills and rigors since last night. Patient reports she had felt like her usual self since her last discharge until today. She had acute onset subjective fevers, chills, rigors d/t which she came to ED. She was discharged from  on  with a course of ciprofloxacin which she reports being compliant with, reports having "5 pills" left in the course. She denies any N/V, cough, sore throat, rhinitis, chest pain, rash, diarrhea or dysuria.   In ED patient febrile with Tmax of 101.9, Soft BP in 90s/40-50s, HR in 70s. CBC noted for WBC 21, H/H 5.8/19, Plt 441. CMP noted for BUN/Cr 28/1.04. Lactate 2.5 > 3.7. F/u/COVID negative. UA cloudy, with trace ketones, mod leuks, pos nitrites, 158 wbcs. Guaiac positive. CTA of abdomen and b/l LE negative for acute hemorrhage. CXR read as "grossly clear lungs". Patient met sepsis criteria, cultures obtained and patient recieved broad spectrum antibiotics with vancomycin and ceftriaxone in ED. Patient also received 2L NS and ordered for 2 U pRBCs. Patient admitted to  for sepsis and symptomatic anemia.    25: No acute events O/N. She is very concerned about having any procedures, even the though of a PICC line and needing IV antibiotics worries her.  25: No acute events since patient was last seen but plans for washout, with flap reconstruction of Right thigh. She denies any SOB, orthopnea, PND, CP.  25: SHe is s/p complex drainage of her wound and tolerated procedure well. Remains questions as to whether or not patient needs to be treated for endocarditis. Patient is overall frustrated and wants to go home but is concerned about the IV antibiotics and does not have help to run the antibiotics.      PMHx:  PAST MEDICAL & SURGICAL HISTORY:  Diabetes mellitus type II, controlled  Atrial fibrillation  Congestive heart failure  Dyspnea  Morbid obesity s/p lap band  Neuropathy  Peripheral venous insufficiency  Thyroid nodule  HTN (hypertension)  S/P left knee arthroscopy  Status post medial meniscus repair  History of cholecystectomy  S/P cataract surgery    FAMILY HISTORY:   FAMILY HISTORY:  Family history of breast cancer in mother  Family history of diabetes mellitus in mother  FHx: heart disease (Sibling)    ALLERGIES:  Allergies  pregabalin (Unknown)  latex (Unknown)  penicillin (Hives; Urticaria)  PC Pen VK (Rash)    REVIEW OF SYSTEMS:  10 system ROS was obtained, all pertinent positives and negatives are in HPI otherwise they were negative.    Vital Signs Last 24 Hrs  T(C): 36.4 (2025 08:34), Max: 36.7 (2025 16:32)  T(F): 97.5 (2025 08:34), Max: 98.1 (2025 16:32)  HR: 57 (2025 08:34) (56 - 57)  BP: 155/51 (2025 08:34) (115/59 - 155/51)  BP(mean): --  RR: 18 (2025 08:34) (18 - 19)  SpO2: 99% (2025 08:34) (97% - 99%)    Parameters below as of 2025 08:34  Patient On (Oxygen Delivery Method): room air      I&O's Summary  2025 07:01  -  2025 07:00  --------------------------------------------------------  IN: 0 mL / OUT: 350 mL / NET: -350 mL    PHYSICAL EXAM:   Constitutional: awake and alert in NAD  HEENT: EOMI, Normal Hearing, MMM  Neck: Soft and supple, No LAD, No JVD, no carotid bruit  Respiratory: Breath sounds are clear bilaterally, No wheezing, rales or rhonchi, good air movement  Cardiovascular: RRR soft systolic murmur at apex  Gastrointestinal: Bowel Sounds present, soft, nontender, nondistended, no guarding, no rebound  Extremities: Warm and well perfused, no peripheral edema  Neurological: A/O x 3, no focal deficits appreciated  Skin: No rashes appreciated    MEDICATIONS  (STANDING):  aMIOdarone    Tablet 200 milliGRAM(s) Oral daily  atorvastatin 10 milliGRAM(s) Oral at bedtime  caspofungin IVPB      caspofungin IVPB 50 milliGRAM(s) IV Intermittent every 24 hours  clopidogrel Tablet 75 milliGRAM(s) Oral daily  furosemide    Tablet 40 milliGRAM(s) Oral daily  influenza  Vaccine (HIGH DOSE) 0.5 milliLiter(s) IntraMuscular once  meropenem Injectable 1000 milliGRAM(s) IV Push every 8 hours  montelukast 10 milliGRAM(s) Oral at bedtime  multivitamin/minerals 1 Tablet(s) Oral daily  naloxone Injectable 0.4 milliGRAM(s) IV Push once  pantoprazole  Injectable 40 milliGRAM(s) IV Push every 12 hours  polyethylene glycol 3350 17 Gram(s) Oral daily  rivaroxaban 20 milliGRAM(s) Oral daily  senna 2 Tablet(s) Oral at bedtime  thiamine 100 milliGRAM(s) Oral daily    MEDICATIONS  (PRN):  acetaminophen     Tablet .. 650 milliGRAM(s) Oral every 6 hours PRN Temp greater or equal to 38C (100.4F)  aluminum hydroxide/magnesium hydroxide/simethicone Suspension 30 milliLiter(s) Oral every 4 hours PRN Dyspepsia  artificial  tears Solution 1 Drop(s) Both EYES every 2 hours PRN Dry Eyes  bisacodyl 5 milliGRAM(s) Oral daily PRN Constipation  HYDROmorphone  Injectable 0.5 milliGRAM(s) IV Push every 4 hours PRN Severe Pain (7 - 10)  melatonin 3 milliGRAM(s) Oral at bedtime PRN Insomnia  ondansetron Injectable 4 milliGRAM(s) IV Push every 6 hours PRN Nausea and/or Vomiting  zolpidem 5 milliGRAM(s) Oral at bedtime PRN Insomnia      LABS: All Labs Reviewed:                         9.4    6.13  )-----------( 342      ( 2025 06:49 )             31.9                         8.8    5.90  )-----------( 282      ( 2025 05:27 )             29.2                        7.2    14.07 )-----------( 242      ( 2025 07:04 )             24.2     02    135  |  103  |  15  ----------------------------<  93  4.2   |  28  |  0.71    Ca    9.2      2025 06:49        136  |  99  |  11  ----------------------------<  100[H]  3.6   |  33[H]  |  0.63    Ca    8.6      2025 05:27  Phos  3.4     28  Mg     1.6         135  |  104  |  19  ----------------------------<  95  3.2[L]   |  26  |  0.82    Ca    8.2[L]      2025 07:04  Phos  4.3       Mg     1.7         TPro  6.0  /  Alb  2.2[L]  /  TBili  0.6  /  DBili  x   /  AST  20  /  ALT  9[L]  /  AlkPhos  54      PT/INR - ( 2025 11:22 )   PT: 25.8 sec;   INR: 2.26 ratio    PTT - ( 2025 11:22 )  PTT:33.6 sec    BLOOD CULTURES: Organism Blood Culture PCR  Gram Stain Blood -- Gram Stain   Growth in aerobic bottle: Gram Negative Rods  Growth in anaerobic bottle: Gram Negative Rods  Specimen Source .Blood BLOOD  Culture-Blood --    RADIOLOGY:   Reviewed in EMR     EK/22/25, my interpretation: NSR LAD IRBBB PRWP    TELEMETRY:    Reviewed, no significant events appreciated, remains in sinus    ECHO:    CONCLUSIONS:      1. Left ventricular cavity is moderately dilated. Left ventricular systolic function is normal with an ejection fraction of 58 % by Ramirez's method of disks with an ejection fraction visually estimated at 55 to 60 %.   2. Compared to the transthoracic echocardiogram performed on 2024, there have been no significant interval changes.    ________________________________________________________________________________________  FINDINGS:     Left Ventricle:  The left ventricular cavity is moderately dilated. Left ventricular wall thickness is normal. Left ventricular systolic function is normal with an ejection fraction visually estimated at 55 to 60%.     Left Atrium:  The left atrium is moderately dilated with an indexed volume of 47.11 ml/m².     Aortic Valve:  A a transcatheter deployed (TAVR) is present in the aortic position.     Mitral Valve:  There is mitral valve thickening of the anterior and posterior leaflets. There is moderate calcification of the mitral valve annulus. Cannot rule out endocarditis due to focal MAC that appears mobile in some views. There is mild mitral regurgitation.     Tricuspid Valve:  Structurally normal tricuspid valve with normal leaflet excursion. Thereis mild to moderate tricuspid regurgitation. Estimated pulmonary artery systolic pressure is 54 mmHg, consistent with moderate pulmonary hypertension.     Pulmonic Valve:  Structurally normal pulmonic valve with normal leaflet excursion.     SystemicVeins:  The inferior vena cava is normal in size measuring 1.63 cm in diameter, (normal <2.1cm) with normal inspiratory collapse (normal >50%) consistent with normal right atrial pressure (~3, range 0-5mmHg).    NOTE: My interpretation is that her MAC appears moderate and stable without characteristics of mobility

## 2025-02-03 ENCOUNTER — TRANSCRIPTION ENCOUNTER (OUTPATIENT)
Age: 75
End: 2025-02-03

## 2025-02-03 LAB
ANION GAP SERPL CALC-SCNC: 4 MMOL/L — LOW (ref 5–17)
BASOPHILS # BLD AUTO: 0.07 K/UL — SIGNIFICANT CHANGE UP (ref 0–0.2)
BASOPHILS NFR BLD AUTO: 1.4 % — SIGNIFICANT CHANGE UP (ref 0–2)
BUN SERPL-MCNC: 15 MG/DL — SIGNIFICANT CHANGE UP (ref 7–23)
CALCIUM SERPL-MCNC: 9.4 MG/DL — SIGNIFICANT CHANGE UP (ref 8.5–10.1)
CHLORIDE SERPL-SCNC: 99 MMOL/L — SIGNIFICANT CHANGE UP (ref 96–108)
CO2 SERPL-SCNC: 30 MMOL/L — SIGNIFICANT CHANGE UP (ref 22–31)
CREAT SERPL-MCNC: 0.75 MG/DL — SIGNIFICANT CHANGE UP (ref 0.5–1.3)
EGFR: 83 ML/MIN/1.73M2 — SIGNIFICANT CHANGE UP
EOSINOPHIL # BLD AUTO: 0.32 K/UL — SIGNIFICANT CHANGE UP (ref 0–0.5)
EOSINOPHIL NFR BLD AUTO: 6.5 % — HIGH (ref 0–6)
GLUCOSE SERPL-MCNC: 103 MG/DL — HIGH (ref 70–99)
HCT VFR BLD CALC: 33 % — LOW (ref 34.5–45)
HGB BLD-MCNC: 9.9 G/DL — LOW (ref 11.5–15.5)
IMM GRANULOCYTES NFR BLD AUTO: 0.6 % — SIGNIFICANT CHANGE UP (ref 0–0.9)
LYMPHOCYTES # BLD AUTO: 1.13 K/UL — SIGNIFICANT CHANGE UP (ref 1–3.3)
LYMPHOCYTES # BLD AUTO: 22.9 % — SIGNIFICANT CHANGE UP (ref 13–44)
MCHC RBC-ENTMCNC: 25.6 PG — LOW (ref 27–34)
MCHC RBC-ENTMCNC: 30 G/DL — LOW (ref 32–36)
MCV RBC AUTO: 85.3 FL — SIGNIFICANT CHANGE UP (ref 80–100)
MONOCYTES # BLD AUTO: 0.55 K/UL — SIGNIFICANT CHANGE UP (ref 0–0.9)
MONOCYTES NFR BLD AUTO: 11.1 % — SIGNIFICANT CHANGE UP (ref 2–14)
NEUTROPHILS # BLD AUTO: 2.84 K/UL — SIGNIFICANT CHANGE UP (ref 1.8–7.4)
NEUTROPHILS NFR BLD AUTO: 57.5 % — SIGNIFICANT CHANGE UP (ref 43–77)
PLATELET # BLD AUTO: 387 K/UL — SIGNIFICANT CHANGE UP (ref 150–400)
POTASSIUM SERPL-MCNC: 3.9 MMOL/L — SIGNIFICANT CHANGE UP (ref 3.5–5.3)
POTASSIUM SERPL-SCNC: 3.9 MMOL/L — SIGNIFICANT CHANGE UP (ref 3.5–5.3)
RBC # BLD: 3.87 M/UL — SIGNIFICANT CHANGE UP (ref 3.8–5.2)
RBC # FLD: 16.7 % — HIGH (ref 10.3–14.5)
SODIUM SERPL-SCNC: 133 MMOL/L — LOW (ref 135–145)
WBC # BLD: 4.94 K/UL — SIGNIFICANT CHANGE UP (ref 3.8–10.5)
WBC # FLD AUTO: 4.94 K/UL — SIGNIFICANT CHANGE UP (ref 3.8–10.5)

## 2025-02-03 PROCEDURE — 99232 SBSQ HOSP IP/OBS MODERATE 35: CPT

## 2025-02-03 RX ORDER — SENNOSIDES 8.6 MG
2 TABLET ORAL
Qty: 0 | Refills: 0 | DISCHARGE
Start: 2025-02-03

## 2025-02-03 RX ORDER — HYDROMORPHONE HYDROCHLORIDE 4 MG/ML
0.5 INJECTION, SOLUTION INTRAMUSCULAR; INTRAVENOUS; SUBCUTANEOUS ONCE
Refills: 0 | Status: DISCONTINUED | OUTPATIENT
Start: 2025-02-03 | End: 2025-02-03

## 2025-02-03 RX ORDER — POLYETHYLENE GLYCOL 3350 17 G/17G
17 POWDER, FOR SOLUTION ORAL
Qty: 0 | Refills: 0 | DISCHARGE
Start: 2025-02-03

## 2025-02-03 RX ORDER — AMIODARONE HYDROCHLORIDE 50 MG/ML
1 INJECTION, SOLUTION INTRAVENOUS
Qty: 0 | Refills: 0 | DISCHARGE
Start: 2025-02-03

## 2025-02-03 RX ORDER — ERTAPENEM SODIUM 1 G/1
1000 INJECTION, POWDER, LYOPHILIZED, FOR SOLUTION INTRAMUSCULAR; INTRAVENOUS EVERY 24 HOURS
Refills: 0 | Status: DISCONTINUED | OUTPATIENT
Start: 2025-02-03 | End: 2025-02-04

## 2025-02-03 RX ORDER — BISACODYL 5 MG
1 TABLET, DELAYED RELEASE (ENTERIC COATED) ORAL
Qty: 0 | Refills: 0 | DISCHARGE
Start: 2025-02-03

## 2025-02-03 RX ORDER — HYDROMORPHONE HYDROCHLORIDE 4 MG/ML
0.5 INJECTION, SOLUTION INTRAMUSCULAR; INTRAVENOUS; SUBCUTANEOUS EVERY 4 HOURS
Refills: 0 | Status: DISCONTINUED | OUTPATIENT
Start: 2025-02-03 | End: 2025-02-04

## 2025-02-03 RX ORDER — ERTAPENEM SODIUM 1 G/1
1 INJECTION, POWDER, LYOPHILIZED, FOR SOLUTION INTRAMUSCULAR; INTRAVENOUS
Qty: 0 | Refills: 0 | DISCHARGE
Start: 2025-02-03

## 2025-02-03 RX ORDER — PANTOPRAZOLE 20 MG/1
1 TABLET, DELAYED RELEASE ORAL
Qty: 0 | Refills: 0 | DISCHARGE

## 2025-02-03 RX ADMIN — ZOLPIDEM TARTRATE 5 MILLIGRAM(S): 5 TABLET, COATED ORAL at 21:26

## 2025-02-03 RX ADMIN — HYDROMORPHONE HYDROCHLORIDE 0.5 MILLIGRAM(S): 4 INJECTION, SOLUTION INTRAMUSCULAR; INTRAVENOUS; SUBCUTANEOUS at 15:37

## 2025-02-03 RX ADMIN — CASPOFUNGIN ACETATE 260 MILLIGRAM(S): 5 INJECTION, POWDER, LYOPHILIZED, FOR SOLUTION INTRAVENOUS at 09:40

## 2025-02-03 RX ADMIN — Medication 100 MILLIGRAM(S): at 09:41

## 2025-02-03 RX ADMIN — HYDROMORPHONE HYDROCHLORIDE 0.5 MILLIGRAM(S): 4 INJECTION, SOLUTION INTRAMUSCULAR; INTRAVENOUS; SUBCUTANEOUS at 06:47

## 2025-02-03 RX ADMIN — ERTAPENEM SODIUM 120 MILLIGRAM(S): 1 INJECTION, POWDER, LYOPHILIZED, FOR SOLUTION INTRAMUSCULAR; INTRAVENOUS at 13:34

## 2025-02-03 RX ADMIN — MEROPENEM 1000 MILLIGRAM(S): 500 INJECTION INTRAVENOUS at 06:16

## 2025-02-03 RX ADMIN — HYDROMORPHONE HYDROCHLORIDE 0.5 MILLIGRAM(S): 4 INJECTION, SOLUTION INTRAMUSCULAR; INTRAVENOUS; SUBCUTANEOUS at 23:15

## 2025-02-03 RX ADMIN — ACETAMINOPHEN, DIPHENHYDRAMINE HCL, PHENYLEPHRINE HCL 3 MILLIGRAM(S): 325; 25; 5 TABLET ORAL at 21:25

## 2025-02-03 RX ADMIN — AMIODARONE HYDROCHLORIDE 200 MILLIGRAM(S): 50 INJECTION, SOLUTION INTRAVENOUS at 09:41

## 2025-02-03 RX ADMIN — RIVAROXABAN 20 MILLIGRAM(S): 20 TABLET, FILM COATED ORAL at 11:35

## 2025-02-03 RX ADMIN — Medication 40 MILLIGRAM(S): at 09:41

## 2025-02-03 RX ADMIN — Medication 75 MILLIGRAM(S): at 09:41

## 2025-02-03 RX ADMIN — MONTELUKAST SODIUM 10 MILLIGRAM(S): 5 TABLET, CHEWABLE ORAL at 21:20

## 2025-02-03 RX ADMIN — PANTOPRAZOLE 40 MILLIGRAM(S): 20 TABLET, DELAYED RELEASE ORAL at 13:28

## 2025-02-03 RX ADMIN — Medication 2 TABLET(S): at 21:20

## 2025-02-03 RX ADMIN — Medication 1 TABLET(S): at 09:42

## 2025-02-03 RX ADMIN — PANTOPRAZOLE 40 MILLIGRAM(S): 20 TABLET, DELAYED RELEASE ORAL at 06:16

## 2025-02-03 RX ADMIN — ATORVASTATIN CALCIUM 10 MILLIGRAM(S): 80 TABLET, FILM COATED ORAL at 21:20

## 2025-02-03 RX ADMIN — HYDROMORPHONE HYDROCHLORIDE 0.5 MILLIGRAM(S): 4 INJECTION, SOLUTION INTRAMUSCULAR; INTRAVENOUS; SUBCUTANEOUS at 11:32

## 2025-02-03 NOTE — DISCHARGE NOTE PROVIDER - NSDCFUADDINST_GEN_ALL_CORE_FT
Please keep the prevena (dressing on your thigh/groin) intact and dry. Please followup in wound care clinic with Dr Manjarrez on Monday 2/10/25

## 2025-02-03 NOTE — PROGRESS NOTE ADULT - ASSESSMENT
73 yo F with PMHx as above presents c/o fevers and chills found to have sepsis 2/2 e. coli bacteremia and non-healing right groin wound s/p washout/drainage of wound.    -Patient's bacteremia is E. Coli which very rarely causes endocarditis, and only seen in immunocompromised patients. She does have diabetes but she does not have CA/on chemo or on medications that reduce her immunity. However cannot completely r/o endocarditis.  -Her echocardiogram shows a well seated TAVR valve and her MV shows moderate amount of calcifications but appear usual for moderate MAC.  -Discussed this with patient, that this infection is unlikely to go to her heart but cannot completely rule it out. She is also stressed about her IV antibiotics since she has no help at home and may need to go to rehab to get the antibiotics. She is not thrilled about going for another procedure such as a ASHLEY and she is an elevated risk for this procedure. Unsure when the next availability would for be a ASHLEY as well, scheduling office closed over the weekend.  -Patient would like to discuss this further with Dr. Mayer who will be rounding tomorrow.  -Continue to tx sepsis as per primary team and ID.    02/03/2025-Clinical status unchanged.  Would arrange for home infusion to administer the antibiotics.  I do not feel a ASHLEY is required.  She remains in sinus rhythm Continue present cardiac management

## 2025-02-03 NOTE — PROGRESS NOTE ADULT - SUBJECTIVE AND OBJECTIVE BOX
CHIEF COMPLAINT:  Patient is a 74y old  Female who presents with a chief complaint of fever/chills    HPI:  74-year-old female with past medical history of DM, TAVR 2024 on Plavix , A-fib on Xarelto, CHF, HLD, PVD, b/l LE venous insufficiency, bilateral CFA stenoses s/p Rt SFA endarterectomy on 24 complicated by postoperative seroma requiring irrigation and debridement and wound VAC placement Seen on  for dislodgment of wound VAC tubing presents for evaluation of fever/chills and rigors since last night. Patient reports she had felt like her usual self since her last discharge until today. She had acute onset subjective fevers, chills, rigors d/t which she came to ED. She was discharged from  on  with a course of ciprofloxacin which she reports being compliant with, reports having "5 pills" left in the course. She denies any N/V, cough, sore throat, rhinitis, chest pain, rash, diarrhea or dysuria.   In ED patient febrile with Tmax of 101.9, Soft BP in 90s/40-50s, HR in 70s. CBC noted for WBC 21, H/H 5.8/19, Plt 441. CMP noted for BUN/Cr 28/1.04. Lactate 2.5 > 3.7. F/u/COVID negative. UA cloudy, with trace ketones, mod leuks, pos nitrites, 158 wbcs. Guaiac positive. CTA of abdomen and b/l LE negative for acute hemorrhage. CXR read as "grossly clear lungs". Patient met sepsis criteria, cultures obtained and patient recieved broad spectrum antibiotics with vancomycin and ceftriaxone in ED. Patient also received 2L NS and ordered for 2 U pRBCs. Patient admitted to  for sepsis and symptomatic anemia.    25: No acute events O/N. She is very concerned about having any procedures, even the though of a PICC line and needing IV antibiotics worries her.  25: No acute events since patient was last seen but plans for washout, with flap reconstruction of Right thigh. She denies any SOB, orthopnea, PND, CP.  25: SHe is s/p complex drainage of her wound and tolerated procedure well. Remains questions as to whether or not patient needs to be treated for endocarditis. Patient is overall frustrated and wants to go home but is concerned about the IV antibiotics and does not have help to run the antibiotics.      PMHx:  PAST MEDICAL & SURGICAL HISTORY:  Diabetes mellitus type II, controlled  Atrial fibrillation  Congestive heart failure  Dyspnea  Morbid obesity s/p lap band  Neuropathy  Peripheral venous insufficiency  Thyroid nodule  HTN (hypertension)  S/P left knee arthroscopy  Status post medial meniscus repair  History of cholecystectomy  S/P cataract surgery    FAMILY HISTORY:   FAMILY HISTORY:  Family history of breast cancer in mother  Family history of diabetes mellitus in mother  FHx: heart disease (Sibling)    ALLERGIES:  Allergies  pregabalin (Unknown)  latex (Unknown)  penicillin (Hives; Urticaria)  PC Pen VK (Rash)    REVIEW OF SYSTEMS:  10 system ROS was obtained, all pertinent positives and negatives are in HPI otherwise they were negative.    ICU Vital Signs Last 24 Hrs  T(C): 36.4 (2025 00:53), Max: 36.8 (2025 17:07)  T(F): 97.6 (2025 00:53), Max: 98.3 (2025 17:07)  HR: 58 (2025 00:53) (57 - 58)  BP: 128/43 (2025 00:53) (128/43 - 155/51)  BP(mean): --  ABP: --  ABP(mean): --  RR: 18 (2025 00:53) (18 - 18)  SpO2: 97% (2025 00:53) (97% - 99%)    O2 Parameters below as of 2025 00:53  Patient On (Oxygen Delivery Method): room air            PHYSICAL EXAM:   Constitutional: awake and alert in NAD  HEENT: EOMI, Normal Hearing, MMM  Neck: Soft and supple, No LAD, No JVD, no carotid bruit  Respiratory: Breath sounds are clear bilaterally, No wheezing, rales or rhonchi, good air movement  Cardiovascular: RRR soft systolic murmur at apex  Gastrointestinal: Bowel Sounds present, soft, nontender, nondistended, no guarding, no rebound  Extremities: Warm and well perfused, no peripheral edema  Neurological: A/O x 3, no focal deficits appreciated  Skin: No rashes appreciated    MEDICATIONS  (STANDING):  aMIOdarone    Tablet 200 milliGRAM(s) Oral daily  atorvastatin 10 milliGRAM(s) Oral at bedtime  caspofungin IVPB      caspofungin IVPB 50 milliGRAM(s) IV Intermittent every 24 hours  clopidogrel Tablet 75 milliGRAM(s) Oral daily  furosemide    Tablet 40 milliGRAM(s) Oral daily  influenza  Vaccine (HIGH DOSE) 0.5 milliLiter(s) IntraMuscular once  meropenem Injectable 1000 milliGRAM(s) IV Push every 8 hours  montelukast 10 milliGRAM(s) Oral at bedtime  multivitamin/minerals 1 Tablet(s) Oral daily  naloxone Injectable 0.4 milliGRAM(s) IV Push once  pantoprazole  Injectable 40 milliGRAM(s) IV Push every 12 hours  polyethylene glycol 3350 17 Gram(s) Oral daily  rivaroxaban 20 milliGRAM(s) Oral daily  senna 2 Tablet(s) Oral at bedtime  thiamine 100 milliGRAM(s) Oral daily    MEDICATIONS  (PRN):  acetaminophen     Tablet .. 650 milliGRAM(s) Oral every 6 hours PRN Temp greater or equal to 38C (100.4F)  aluminum hydroxide/magnesium hydroxide/simethicone Suspension 30 milliLiter(s) Oral every 4 hours PRN Dyspepsia  artificial  tears Solution 1 Drop(s) Both EYES every 2 hours PRN Dry Eyes  bisacodyl 5 milliGRAM(s) Oral daily PRN Constipation  HYDROmorphone  Injectable 0.5 milliGRAM(s) IV Push every 4 hours PRN Severe Pain (7 - 10)  melatonin 3 milliGRAM(s) Oral at bedtime PRN Insomnia  ondansetron Injectable 4 milliGRAM(s) IV Push every 6 hours PRN Nausea and/or Vomiting  zolpidem 5 milliGRAM(s) Oral at bedtime PRN Insomnia      LABS: All Labs Reviewed:                         9.4    6.13  )-----------( 342      ( 2025 06:49 )             31.9                         8.8    5.90  )-----------( 282      ( 2025 05:27 )             29.2                        7.2    14.07 )-----------( 242      ( 2025 07:04 )             24.2     02-02    135  |  103  |  15  ----------------------------<  93  4.2   |  28  |  0.71    Ca    9.2      2025 06:49        136  |  99  |  11  ----------------------------<  100[H]  3.6   |  33[H]  |  0.63    Ca    8.6      2025 05:27  Phos  3.4       Mg     1.6         135  |  104  |  19  ----------------------------<  95  3.2[L]   |  26  |  0.82    Ca    8.2[L]      2025 07:04  Phos  4.3       Mg     1.7         TPro  6.0  /  Alb  2.2[L]  /  TBili  0.6  /  DBili  x   /  AST  20  /  ALT  9[L]  /  AlkPhos  54      PT/INR - ( 2025 11:22 )   PT: 25.8 sec;   INR: 2.26 ratio    PTT - ( 2025 11:22 )  PTT:33.6 sec    BLOOD CULTURES: Organism Blood Culture PCR  Gram Stain Blood -- Gram Stain   Growth in aerobic bottle: Gram Negative Rods  Growth in anaerobic bottle: Gram Negative Rods  Specimen Source .Blood BLOOD  Culture-Blood --    RADIOLOGY:   Reviewed in EMR     EK/22/25, my interpretation: NSR LAD IRBBB PRWP    TELEMETRY:    Reviewed, no significant events appreciated, remains in sinus    ECHO:    CONCLUSIONS:      1. Left ventricular cavity is moderately dilated. Left ventricular systolic function is normal with an ejection fraction of 58 % by Ramirez's method of disks with an ejection fraction visually estimated at 55 to 60 %.   2. Compared to the transthoracic echocardiogram performed on 2024, there have been no significant interval changes.    ________________________________________________________________________________________  FINDINGS:     Left Ventricle:  The left ventricular cavity is moderately dilated. Left ventricular wall thickness is normal. Left ventricular systolic function is normal with an ejection fraction visually estimated at 55 to 60%.     Left Atrium:  The left atrium is moderately dilated with an indexed volume of 47.11 ml/m².     Aortic Valve:  A a transcatheter deployed (TAVR) is present in the aortic position.     Mitral Valve:  There is mitral valve thickening of the anterior and posterior leaflets. There is moderate calcification of the mitral valve annulus. Cannot rule out endocarditis due to focal MAC that appears mobile in some views. There is mild mitral regurgitation.     Tricuspid Valve:  Structurally normal tricuspid valve with normal leaflet excursion. Thereis mild to moderate tricuspid regurgitation. Estimated pulmonary artery systolic pressure is 54 mmHg, consistent with moderate pulmonary hypertension.     Pulmonic Valve:  Structurally normal pulmonic valve with normal leaflet excursion.     SystemicVeins:  The inferior vena cava is normal in size measuring 1.63 cm in diameter, (normal <2.1cm) with normal inspiratory collapse (normal >50%) consistent with normal right atrial pressure (~3, range 0-5mmHg).    NOTE: My interpretation is that her MAC appears moderate and stable without characteristics of mobility

## 2025-02-03 NOTE — PROGRESS NOTE ADULT - SUBJECTIVE AND OBJECTIVE BOX
Date of service: 02-03-25 @ 12:08    Lying in bed in NAD  Events noted  Vascular surgeon reported that groin wound did not look infected during recent surgical procedure  The surgeon felt the infection was only superficial  No fever or chills  Inguinal wound is healing as expected    ROS: no fever or chills; denies dizziness, no HA, no SOB or cough, no abdominal pain, no diarrhea or constipation; no dysuria, no legs pain, no rashes    MEDICATIONS  (STANDING):  aMIOdarone    Tablet 200 milliGRAM(s) Oral daily  atorvastatin 10 milliGRAM(s) Oral at bedtime  caspofungin IVPB 50 milliGRAM(s) IV Intermittent every 24 hours  caspofungin IVPB      clopidogrel Tablet 75 milliGRAM(s) Oral daily  furosemide    Tablet 40 milliGRAM(s) Oral daily  influenza  Vaccine (HIGH DOSE) 0.5 milliLiter(s) IntraMuscular once  meropenem Injectable 1000 milliGRAM(s) IV Push every 8 hours  montelukast 10 milliGRAM(s) Oral at bedtime  multivitamin/minerals 1 Tablet(s) Oral daily  naloxone Injectable 0.4 milliGRAM(s) IV Push once  pantoprazole  Injectable 40 milliGRAM(s) IV Push every 12 hours  polyethylene glycol 3350 17 Gram(s) Oral daily  rivaroxaban 20 milliGRAM(s) Oral daily  senna 2 Tablet(s) Oral at bedtime  thiamine 100 milliGRAM(s) Oral daily    Vital Signs Last 24 Hrs  T(C): 36.7 (03 Feb 2025 08:59), Max: 36.8 (02 Feb 2025 17:07)  T(F): 98 (03 Feb 2025 08:59), Max: 98.3 (02 Feb 2025 17:07)  HR: 55 (03 Feb 2025 08:59) (55 - 58)  BP: 133/69 (03 Feb 2025 08:59) (128/43 - 133/69)  BP(mean): --  RR: 18 (03 Feb 2025 08:59) (18 - 18)  SpO2: 100% (03 Feb 2025 08:59) (97% - 100%)    Parameters below as of 03 Feb 2025 08:59  Patient On (Oxygen Delivery Method): room air         Physical exam:    Constitutional:  No acute distress  HEENT: NC/AT, EOMI, PERRLA, conjunctivae clear; ears and nose atraumatic; pharynx benign  Neck: supple; thyroid not palpable  Back: no tenderness  Respiratory: respiratory effort normal; clear to auscultation  Cardiovascular: S1S2 regular, no murmurs  Abdomen: soft, not tender, not distended, positive BS; no liver or spleen organomegaly  Genitourinary: no suprapubic tenderness  Lymphatic: no LN palpable  Musculoskeletal: no muscle tenderness, no joint swelling or tenderness  Extremities: no pedal edema  Right inguinal postsurgical wound improving  Neurological/ Psychiatric: AxOx3, judgement and insight normal; moving all extremities  Skin: no rashes; no palpable lesions    Labs: reviewed                        9.9    4.94  )-----------( 387      ( 03 Feb 2025 07:02 )             33.0     02-03    133[L]  |  99  |  15  ----------------------------<  103[H]  3.9   |  30  |  0.75    Ca    9.4      03 Feb 2025 07:02                        9.1    5.36  )-----------( 345      ( 31 Jan 2025 07:55 )             30.9     01-31    136  |  98  |  19  ----------------------------<  89  3.8   |  35[H]  |  0.67    Ca    8.9      31 Jan 2025 07:55                        9.3    6.00  )-----------( 331      ( 29 Jan 2025 07:30 )             31.0     01-29    134[L]  |  99  |  12  ----------------------------<  128[H]  3.7   |  32[H]  |  0.65    Ca    8.8      29 Jan 2025 07:30  Phos  3.4     01-28  Mg     1.6     01-28                        8.9    10.05 )-----------( 306      ( 27 Jan 2025 06:51 )             29.2     01-27    136  |  99  |  8   ----------------------------<  92  3.4[L]   |  31  |  0.62    Ca    9.1      27 Jan 2025 06:51               8.6    12.66 )-----------( 282      ( 25 Jan 2025 03:39 )             27.6     01-25    134[L]  |  104  |  16  ----------------------------<  110[H]  3.8   |  26  |  0.70    Ca    8.1[L]      25 Jan 2025 03:39  Mg     1.7     01-25    TPro  6.0  /  Alb  2.2[L]  /  TBili  0.6  /  DBili  x   /  AST  20  /  ALT  9[L]  /  AlkPhos  54  01-24                        7.9    17.46 )-----------( 286      ( 23 Jan 2025 06:26 )             25.7     01-23    132[L]  |  105  |  25[H]  ----------------------------<  96  4.7   |  22  |  1.21    Ca    8.3[L]      23 Jan 2025 06:26  Phos  4.3     01-23  Mg     1.7     01-23    TPro  6.9  /  Alb  2.6[L]  /  TBili  1.0  /  DBili  x   /  AST  37  /  ALT  9[L]  /  AlkPhos  61  01-23     LIVER FUNCTIONS - ( 23 Jan 2025 06:26 )  Alb: 2.6 g/dL / Pro: 6.9 gm/dL / ALK PHOS: 61 U/L / ALT: 9 U/L / AST: 37 U/L / GGT: x           Urinalysis with Rflx Culture (01-22 @ 11:20)  Urine Appearance: Cloudy  Protein, Urine: Negative mg/dL  Urine Microscopic-Add On (NC) (01-22 @ 11:20)  White Blood Cell - Urine: 158 /HPF  Red Blood Cell - Urine: 1 /HPF    1/8 tissue c/s ENFA and CAGL    Urinalysis with Rflx Culture (collected 22 Jan 2025 11:20)    Culture - Blood (collected 22 Jan 2025 11:20)  Source: .Blood BLOOD  Gram Stain (23 Jan 2025 02:03):    Growth in aerobic bottle: Gram Negative Rods    Growth in anaerobic bottle: Gram Negative Rods  Final Report (24 Jan 2025 11:49):    Growth in aerobic and anaerobic bottles: Escherichia coli ESBL    Direct identification is available within approximately 3-5    hours either by Blood Panel Multiplexed PCR or Direct    MALDI-TOF. Details: [email or web address]  Organism: Blood Culture PCR  Escherichia coli ESBL (24 Jan 2025 11:49)  Organism: Escherichia coli ESBL (24 Jan 2025 11:49)      Method Type: TOYA      -  Ampicillin: R >16 These ampicillin results predict results for amoxicillin      -  Ampicillin/Sulbactam: R 16/8      -  Aztreonam: R >16      -  Cefazolin: R >16      -  Cefepime: R >16      -  Ceftriaxone: R >32      -  Ciprofloxacin: R >2      -  Ertapenem: S <=0.5      -  Gentamicin: S <=2      -  Imipenem: S <=1      -  Levofloxacin: R >4      -  Meropenem: S <=1      -  Piperacillin/Tazobactam: R <=8      -  Tobramycin: S <=2      -  Trimethoprim/Sulfamethoxazole: S <=0.5/9.5  Organism: Blood Culture PCR (24 Jan 2025 11:49)      Method Type: PCR      -  Escherichia coli: Detec      -  ESBL: Detec      -  CTX-M Resistance Marker: Detec    Culture - Urine (collected 22 Jan 2025 11:20)  Source: Catheterized None  Final Report (24 Jan 2025 11:54):    >100,000 CFU/ml Escherichia coli ESBL    >100,000 CFU/ml Escherichia coli ESBL #2    Multiple Morphological Strains  Organism: Escherichia coli ESBL  Escherichia coli ESBL (24 Jan 2025 11:54)  Organism: Escherichia coli ESBL (24 Jan 2025 11:54)      Method Type: TOYA      -  Ampicillin: R >16 These ampicillin results predict results for amoxicillin      -  Ampicillin/Sulbactam: I 16/8      -  Aztreonam: R >16      -  Cefazolin: R >16 For uncomplicated UTI with K. pneumoniae, E. coli, or P. mirablis: TOYA <=16 is sensitive and TOYA >=32 is resistant. This also predicts results for oral agents cefaclor, cefdinir, cefpodoxime, cefprozil, cefuroxime axetil, cephalexin and locarbef for uncomplicated UTI. Note that some isolates may be susceptible to these agents while testing resistant to cefazolin.      -  Cefepime: R >16      -  Ceftriaxone: R >32      -  Cefuroxime: R >16      -  Ciprofloxacin: R >2      -  Ertapenem: S <=0.5      -  Gentamicin: S <=2      -  Imipenem: S <=1      -  Levofloxacin: R >4      -  Meropenem: S <=1      -  Nitrofurantoin: S <=32 Should not be used to treat pyelonephritis      -  Piperacillin/Tazobactam: S <=8      -  Tobramycin: S <=2      -  Trimethoprim/Sulfamethoxazole: S <=0.5/9.5  Organism: Escherichia coli ESBL (24 Jan 2025 11:54)      Method Type: TOYA      -  Ampicillin: R >16 These ampicillin results predict results for amoxicillin      -  Ampicillin/Sulbactam: R >16/8      -  Aztreonam: R >16      -  Cefazolin: R >16 For uncomplicated UTI with K. pneumoniae, E. coli, or P. mirablis: TOYA <=16 is sensitive and TOYA >=32 is resistant. This also predicts results for oral agents cefaclor, cefdinir, cefpodoxime, cefprozil, cefuroxime axetil, cephalexin and locarbef for uncomplicated UTI. Note that some isolates may be susceptible to these agents while testing resistant to cefazolin.      -  Cefepime: R >16      -  Ceftriaxone: R >32      -  Cefuroxime: R >16      -  Ciprofloxacin: R >2      -  Ertapenem: S <=0.5      -  Gentamicin: S <=2      -  Imipenem: S <=1      -  Levofloxacin: R >4      -  Meropenem: S <=1      -  Nitrofurantoin: S <=32 Should not be used to treat pyelonephritis      -  Piperacillin/Tazobactam: S <=8      -  Tobramycin: S <=2      -  Trimethoprim/Sulfamethoxazole: S <=0.5/9.5    Culture - Blood (collected 27 Jan 2025 06:55)  Source: .Blood BLOOD  Preliminary Report (28 Jan 2025 13:01):    No growth at 24 hours    Culture - Blood (collected 27 Jan 2025 06:51)  Source: .Blood BLOOD  Preliminary Report (28 Jan 2025 13:01):    No growth at 24 hours    Radiology: all available radiological tests reviewed    Advanced directives addressed: full resuscitation

## 2025-02-03 NOTE — PROGRESS NOTE ADULT - ASSESSMENT
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 Patient reports she had felt like her usual self since her last discharge until today. She had acute onset subjective fevers, chills, rigors d/t which she came to ED. In ED patient febrile with Tmax of 101.9, Soft BP in 90s/40-50s, HR in 70s. CBC noted for WBC 21, H/H 5.8/19. Admitted for:    #Sepsis 2/2 ESBL E. Coli bacteremia , ESBL E. Coli UTI  #Right inguinal postsurgical bacterial and fungal subcutaneous  infection s/p right SFA endarterectomy  #R inguinal  subcutaneous abscesscollection with ENFA and CAGL   Meropenem and caspofungin,  s/p R groin I & D wash out 1/28,    Prevena  changed today    arrange for home infusion to administer the antibiotics. per cardio  ASHLEY is  not required.   -surgical evaluation appreciated - s/p washout --> Dr. Manjarrez felt there is no further wound infection  change abx to ertapenem 1 gm IV qd for 31 more days to treat for probable SBE  will need PICC line and outpatient abx therapy for 31 more days  - CT RLE as above-    - BCx and UCx 1/22 both growing ESBL E. Coli Cardiology consulted,      #Acute blood loss anemia 2/2 GIB   - Patient on Xarelto for Afib, with guaiac positive test in ED  - CTA abd/pelvis negative for acute bleed  - C/w protonix BID  s/p 4 units PRBC   - Placed on heparin gtt to determine if can tolerate AC (xarelto/plavix remain held)   - GI consulted, no current plan for scope at this time   hb stable     #Chronic heart failure with preserved ejection fraction (HFpEF)  #AS s/p TAVR  -Last ECHO done on 12/05/2024: LVEF 56%, LV cavity moderately dilated, normal wall thickness, normal LVEF, severe (grade 3) LV diastolic dysfunction, severe LA dilatation, mod to moderate RA dilatation, normal RV size and function. -C/w her lasix,         .#paroxsymal Atrial fibrillation   #Elevated troponin   - In sinus rhythm here. - C/w her amiodarone.   - Holding Cardizem, metoprolol   xarelto resumed   on xarelto  - Cardiology following Dr Sivan Jimenez     #DM2 (diabetes mellitus)    - Last A1c 5.8 (4/2024)  - Not on any medications.   - Carb restricted diet when appropriate.    #HLD (hyperlipidemia)   - on home statin and Zetia  - C/w her statin.    # Insomnia.  - Ambien PRN    #Anxiety - trial PO ativan 0.5mg PRN for anxiety   #DVT ppx   - on xarelto    dispo  plan to discharge with piccline for IV abx for 31 days more  rehab vs home   total time spent 55mins    74-year-old female with past medical history of DM, TAVR 8/2024 on Plavix , A-fib on Xarelto, CHF, HLD, PVD, b/l LE venous insufficiency, bilateral CFA stenoses s/p Rt SFA endarterectomy on 12/17/24 complicated by postoperative seroma requiring irrigation and debridement and wound VAC placement Seen on January 18 for dislodgment of wound VAC tubing presents for evaluation of fever/chills and rigors since last night. Patient reports she had felt like her usual self since her last discharge until today. She had acute onset subjective fevers, chills, rigors d/t which she came to ED. In ED patient febrile with Tmax of 101.9, Soft BP in 90s/40-50s, HR in 70s. CBC noted for WBC 21, H/H 5.8/19. Admitted for:    #Sepsis 2/2 ESBL E. Coli bacteremia , ESBL E. Coli UTI  #Right inguinal postsurgical bacterial and fungal subcutaneous  infection s/p right SFA endarterectomy  #R inguinal  subcutaneous abscesscollection with ENFA and CAGL   Meropenem and caspofungin,  s/p R groin I & D wash out 1/28,    Prevena  changed today    arrange for home infusion to administer the antibiotics. per cardio  ASHLEY is  not required.   -surgical evaluation appreciated - s/p washout --> Dr. Manjarrez felt there is no further wound infection  change abx to ertapenem 1 gm IV qd for 31 more days to treat for probable SBE  will need PICC line and outpatient abx therapy for 31 more days  - CT RLE as above-    - BCx and UCx 1/22 both growing ESBL E. Coli Cardiology consulted,      #Acute blood loss anemia 2/2 GIB   - Patient on Xarelto for Afib, with guaiac positive test in ED  - CTA abd/pelvis negative for acute bleed  - C/w protonix BID  s/p 4 units PRBC   - Placed on heparin gtt to determine if can tolerate AC (xarelto/plavix remain held)   - GI consulted, no current plan for scope at this time   hb stable     #Chronic heart failure with preserved ejection fraction (HFpEF)  #AS s/p TAVR  -Last ECHO done on 12/05/2024: LVEF 56%, LV cavity moderately dilated, normal wall thickness, normal LVEF, severe (grade 3) LV diastolic dysfunction, severe LA dilatation, mod to moderate RA dilatation, normal RV size and function. -C/w her lasix,         .#paroxsymal Atrial fibrillation   #Elevated troponin   - In sinus rhythm here. - C/w her amiodarone.   - Holding Cardizem, metoprolol  as currently sinus ankush 50-60  xarelto resumed   on xarelto  - Cardiology following Dr Sivan Jimenez     #DM2 (diabetes mellitus)    - Last A1c 5.8 (4/2024)  - Not on any medications.   - Carb restricted diet when appropriate.    #HLD (hyperlipidemia)   - on home statin and Zetia  - C/w her statin.    # Insomnia.  - Ambien PRN    #Anxiety - trial PO ativan 0.5mg PRN for anxiety   #DVT ppx   - on xarelto    dispo  plan to discharge with piccline for IV abx for 31 days more  rehab vs home   total time spent 55mins

## 2025-02-03 NOTE — DISCHARGE NOTE PROVIDER - NSDCMRMEDTOKEN_GEN_ALL_CORE_FT
Acidophilus oral tablet: 1 tab(s) orally once a day  amiodarone 200 mg oral tablet: 1 tab(s) orally once a day  bisacodyl 5 mg oral delayed release tablet: 1 tab(s) orally once a day As needed Constipation  clopidogrel 75 mg oral tablet: 1 tab(s) orally once a day  ertapenem 1 g injection: 1 gram(s) injectable once a day for 31 more days  from 2/3/25  ezetimibe 10 mg oral tablet: 1 tab(s) orally once a day (in the evening)  furosemide 40 mg oral tablet: 1 tab(s) orally once a day  gabapentin 800 mg oral tablet: 1 tab(s) orally 3 times a day  montelukast 10 mg oral tablet: 1 tab(s) orally once a day (at bedtime)  Multiple Vitamins with Minerals oral tablet: 1 tab(s) orally once a day  ocular lubricant preservative-free ophthalmic solution: 1 drop(s) to each affected eye every 2 hours As needed Dry Eyes  oxyCODONE 5 mg oral tablet: 1 tab(s) orally every 8 hours as needed for Severe Pain (7 - 10) May take 1/2 tab for moderate pain MDD: 15mg  pantoprazole 40 mg oral delayed release tablet: 1 tab(s) orally 2 times a day  polyethylene glycol 3350 oral powder for reconstitution: 17 gram(s) orally once a day  pravastatin 10 mg oral tablet: 1 tab(s) orally once a day (in the evening)  senna leaf extract oral tablet: 2 tab(s) orally once a day (at bedtime)  thiamine 100 mg oral tablet: 1 tab(s) orally once a day  Tylenol 325 mg oral tablet: 2 tab(s) orally every 6 hours As needed Temp greater or equal to 38C (100.4F), Mild Pain (1 - 3)  Vitamin D3 25 mcg (1000 intl units) oral capsule: 1 cap(s) orally once a day  Xarelto 20 mg oral tablet: 1 tab(s) orally once a day  zolpidem 10 mg oral tablet: 1 tab(s) orally once a day (at bedtime) as needed for  insomnia MDD: 1

## 2025-02-03 NOTE — DISCHARGE NOTE PROVIDER - CARE PROVIDER_API CALL
Justo Mayer  Cardiovascular Disease  [address], [address]  [place] [address]  Phone: [phone]  Fax: [phone]  Follow Up Time:     Shilpa Avila  Gastroenterology  [address], [place] [address]  Phone: [phone]  Fax: [phone]  Follow Up Time:     Caryl Manjarrez  Vascular Surgery  [address], [address]  [place] [address]  Phone: [phone]  Fax: [phone]  Follow Up Time:

## 2025-02-03 NOTE — DISCHARGE NOTE PROVIDER - HOSPITAL COURSE
not required.   -surgical evaluation appreciated - s/p washout --> Dr. Manjarrez felt there is no further wound infection  change abx to ertapenem 1 gm IV qd for 31 more days to treat for probable SBE  will need PICC line and outpatient abx therapy for 31 more days  - CT RLE as above-    - BCx and UCx 1/22 both growing ESBL E. Coli Cardiology consulted,    #Acute blood loss anemia 2/2 GIB   - Patient on Xarelto for Afib, with guaiac positive test in ED  - CTA abd/pelvis negative for acute bleed  - C/w protonix BID  s/p 4 units PRBC   resumed xarelto and plavix    - GI consulted, no current plan for scope at this time   hb stable     #Chronic heart failure with preserved ejection fraction (HFpEF)  #AS s/p TAVR  -Last ECHO done on 12/05/2024: LVEF 56%, LV cavity moderately dilated, normal wall thickness, normal LVEF, severe (grade 3) LV diastolic dysfunction, severe LA dilatation, mod to moderate RA dilatation, normal RV size and function. -C/w her lasix, - Carb restricted diet when appropriate.   -C/w her lasix,         .#paroxsymal Atrial fibrillation   #Elevated troponin   - In sinus rhythm here. - C/w her amiodarone.   - Holding Cardizem, metoprolol  as currently sinus ankush 50-60  xarelto resumed   on xarelto and plavix   - Cardiology following Dr Sivan Jimenez       #HLD (hyperlipidemia)   - on home statin and Zetia  - C/w her statin.    # Insomnia.  - Ambien PRN    #Anxiety - trial PO ativan 0.5mg PRN for anxiety   #DVT ppx   - on xarelto    dispo  plan to discharge with piccline for IV abx for 31 days more  rehab vs home   total time spent 55mins     2/3 denies sob, no CP, has r groin drain removed today , has r groin pain controlled with diluaidid      PHYSICAL EXAM:  Constitutional: NAD, awake and alert, obese, chronically ill appearing   HEENT: PERRLA, EOMI, MMM  Respiratory: Breath sounds are clear bilaterally, No wheezing, rales or rhonchi  Cardiovascular: S1 and S2, RRR, no murmurs, gallops or rubs  Gastrointestinal: +BS, soft, non-tender, non-distended, no CVA tenderness  Extremities: No peripheral edema, +DP pulses b/l  Neurological: A&O x 3, no focal deficitsMusculoskeletal: 5/5 strength b/l upper and lower extremities  Skin: Chronic LE venous stasis changes, +R groin wound foul smelling, skin warm and dry

## 2025-02-03 NOTE — PROGRESS NOTE ADULT - ASSESSMENT
74-year-old F, with PMHx of DM, TAVR 8/2024 on Plavix , A-fib on Xarelto, CHF, HLD, PVD, b/l LE venous insufficiency, bilateral CFA stenoses s/p Rt SFA endarterectomy on 12/17/24 with R groin surgical site infection.     #S/p R groin I and D with complex wound closure, POD 6      PLAN  -continue plavix and Xarelto   -Monitor drain output> possible removal today   -f/u ID recs for abx coverage: on radha/caspofungin  -PT, OOBTC, encourage ambulation as tolerated  -Prevena will be changed today please premedicate the patient   -Rest of care per primary team

## 2025-02-03 NOTE — PROGRESS NOTE ADULT - SUBJECTIVE AND OBJECTIVE BOX
SURGERY DAILY PROGRESS NOTE:     Subjective:  Patient seen and examined at bedside during am rounds. AVSS. Denies any fevers, chills, n/v/d, chest pain or shortness of breath  Prevena and BELGICA in place   ID on borad, on  radha/caspofungin, Cards: deferring ASHLEY since chance of endocarditis iso E coli is low, BELGICA minimal output, prevena in place, No leukocytosis no fever    Objective:      PHYSICAL EXAM   Gen: win no acute distress  CV: pulse regularly present   Resp: airway patent, non-labored breathing  Abd: soft, NTND; no rebound or guarding. R groin wound, dressing/packing c/d/i  Ext: RLE BELGICA in place w minimal output, prevena in place functioning       Vitals:  T(C): 36.4 (02-03 @ 00:53), Max: 36.8 (02-02 @ 17:07)  HR: 58 (02-03 @ 00:53) (57 - 58)  BP: 128/43 (02-03 @ 00:53) (128/43 - 155/51)  RR: 18 (02-03 @ 00:53) (18 - 18)  SpO2: 97% (02-03 @ 00:53) (97% - 99%)    02-01 @ 07:01  -  02-02 @ 07:00  --------------------------------------------------------  IN:  Total IN: 0 mL    OUT:    Drain (mL): 25 mL  Total OUT: 25 mL    Total NET: -25 mL          02-02 @ 06:49                    9.4  CBC: 6.13>)-------(<342                     31.9                 135 | 103 | 15    CMP:  ----------------------< 93               4.2 | 28 | 0.71                      Ca:9.2  Phos:-  Mg:-               -|      |-        LFTs:  ------|-|-----             -|      |-      Current Inpatient Medications:  acetaminophen     Tablet .. 650 milliGRAM(s) Oral every 6 hours PRN  aluminum hydroxide/magnesium hydroxide/simethicone Suspension 30 milliLiter(s) Oral every 4 hours PRN  aMIOdarone    Tablet 200 milliGRAM(s) Oral daily  artificial  tears Solution 1 Drop(s) Both EYES every 2 hours PRN  atorvastatin 10 milliGRAM(s) Oral at bedtime  bisacodyl 5 milliGRAM(s) Oral daily PRN  caspofungin IVPB 50 milliGRAM(s) IV Intermittent every 24 hours  caspofungin IVPB      clopidogrel Tablet 75 milliGRAM(s) Oral daily  furosemide    Tablet 40 milliGRAM(s) Oral daily  HYDROmorphone  Injectable 0.5 milliGRAM(s) IV Push once  influenza  Vaccine (HIGH DOSE) 0.5 milliLiter(s) IntraMuscular once  melatonin 3 milliGRAM(s) Oral at bedtime PRN  meropenem Injectable 1000 milliGRAM(s) IV Push every 8 hours  montelukast 10 milliGRAM(s) Oral at bedtime  multivitamin/minerals 1 Tablet(s) Oral daily  naloxone Injectable 0.4 milliGRAM(s) IV Push once  ondansetron Injectable 4 milliGRAM(s) IV Push every 6 hours PRN  pantoprazole  Injectable 40 milliGRAM(s) IV Push every 12 hours  polyethylene glycol 3350 17 Gram(s) Oral daily  rivaroxaban 20 milliGRAM(s) Oral daily  senna 2 Tablet(s) Oral at bedtime  thiamine 100 milliGRAM(s) Oral daily  zolpidem 5 milliGRAM(s) Oral at bedtime PRN

## 2025-02-03 NOTE — DISCHARGE NOTE PROVIDER - NSDCCPCAREPLAN_GEN_ALL_CORE_FT
PRINCIPAL DISCHARGE DIAGNOSIS  Diagnosis: Symptomatic anemia  Assessment and Plan of Treatment: please follow up with vascular surg and cardio as outpatient      SECONDARY DISCHARGE DIAGNOSES  Diagnosis: Urinary tract infection  Assessment and Plan of Treatment:     Diagnosis: Lower GI bleed  Assessment and Plan of Treatment:

## 2025-02-03 NOTE — PROGRESS NOTE ADULT - SUBJECTIVE AND OBJECTIVE BOX
74-year-old female with past medical history of DM, TAVR 2024 on Plavix , A-fib on Xarelto, CHF, HLD, PVD, b/l LE venous insufficiency, bilateral CFA stenoses s/p Rt SFA endarterectomy on 24 complicated by postoperative seroma requiring irrigation and debridement and wound VAC placement Seen on  for dislodgment of wound VAC tubing presents for evaluation of fever/chills and rigors since last night. Patient reports she had felt like her usual self since her last discharge until today. She had acute onset subjective fevers, chills, rigors d/t which she came to ED. She was discharged from  on  with a course of ciprofloxacin which she reports being compliant with, reports having "5 pills" left in the course. She denies any N/V, cough, sore throat, rhinitis, chest pain, rash, diarrhea or dysuria.   In ED patient febrile with Tmax of 101.9, Soft BP in 90s/40-50s, HR in 70s. CBC noted for WBC 21, H/H 5.8/19, Plt 441. CMP noted for BUN/Cr 28/1.04. Lactate 2.5 > 3.7. F/u/COVID negative. UA cloudy, with trace ketones, mod leuks, pos nitrites, 158 wbcs. Guaiac positive. CTA of abdomen and b/l LE negative for acute hemorrhage. CXR read as "grossly clear lungs". Patient met sepsis criteria, cultures obtained and patient recieved broad spectrum antibiotics with vancomycin and ceftriaxone in ED. Patient also recieved 2L NS and ordered for 2 U pRBCs. Patient admitted to  for sepsis and symptomatic anemia.       Subjective: Patient seen and examined.  Patient reports that her lower extremity wound pain is worse today compared to yesterday.  Patient in mild distress due to the pain.  Vascular surgery attempted wound VAC reapplication today however patient refused.   at bedside.  Patient denies any chest pain shortness of breath nausea vomiting diarrhea.  Continue IV antibiotics and antifungal.  WBC is improved to 9.97.  Hemoglobin stable at 8.8.     Additional results/Imaging, I have personally reviewed:    LABS:All other systems reviewed and found to be negative with the exception of what has been described above.    2/3 denies sob, no CP, has r groin drain removed today , has r groin pain controlled with diluaidid    PHYSICAL EXAM:    Daily     Daily Weight in k.4 (2025 00:53)    ICU Vital Signs Last 24 Hrs  T(C): 36.4 (2025 17:22), Max: 36.7 (2025 08:59)  T(F): 97.5 (2025 17:22), Max: 98 (2025 08:59)  HR: 67 (2025 17:22) (55 - 67)  BP: 129/54 (2025 17:22) (128/43 - 133/69)  BP(mean): --  ABP: --  ABP(mean): --  RR: 18 (2025 17:22) (18 - 18)  SpO2: 100% (2025 17:22) (97% - 100%)    O2 Parameters below as of 2025 08:59  Patient On (Oxygen Delivery Method): room air  PHYSICAL EXAM:  Constitutional: NAD, awake and alert, obese, chronically ill appearing   HEENT: PERRLA, EOMI, MMM  Respiratory: Breath sounds are clear bilaterally, No wheezing, rales or rhonchi  Cardiovascular: S1 and S2, RRR, no murmurs, gallops or rubs  Gastrointestinal: +BS, soft, non-tender, non-distended, no CVA tenderness  Extremities: No peripheral edema, +DP pulses b/l  Neurological: A&O x 3, no focal deficitsMusculoskeletal: 5/5 strength b/l upper and lower extremities  Skin: Chronic LE venous stasis changes, +R groin wound foul smelling, skin warm and dry      labs reviewed                                            9.9    4.94  )-----------( 387      ( 2025 07:02 )             33.0       CBC Full  -  ( 2025 07:02 )  WBC Count : 4.94 K/uL  RBC Count : 3.87 M/uL  Hemoglobin : 9.9 g/dL  Hematocrit : 33.0 %  Platelet Count - Automated : 387 K/uL  Mean Cell Volume : 85.3 fl  Mean Cell Hemoglobin : 25.6 pg  Mean Cell Hemoglobin Concentration : 30.0 g/dL  Auto Neutrophil # : 2.84 K/uL  Auto Lymphocyte # : 1.13 K/uL  Auto Monocyte # : 0.55 K/uL  Auto Eosinophil # : 0.32 K/uL  Auto Basophil # : 0.07 K/uL  Auto Neutrophil % : 57.5 %  Auto Lymphocyte % : 22.9 %  Auto Monocyte % : 11.1 %  Auto Eosinophil % : 6.5 %  Auto Basophil % : 1.4 %      -03    133[L]  |  99  |  15  ----------------------------<  103[H]  3.9   |  30  |  0.75    Ca    9.4      2025 07:02                      Urinalysis Basic - ( 2025 07:02 )    Color: x / Appearance: x / SG: x / pH: x  Gluc: 103 mg/dL / Ketone: x  / Bili: x / Urobili: x   Blood: x / Protein: x / Nitrite: x   Leuk Esterase: x / RBC: x / WBC x   Sq Epi: x / Non Sq Epi: x / Bacteria: x            MEDICATIONS  (STANDING):  aMIOdarone    Tablet 200 milliGRAM(s) Oral daily  atorvastatin 10 milliGRAM(s) Oral at bedtime  clopidogrel Tablet 75 milliGRAM(s) Oral daily  ertapenem  IVPB 1000 milliGRAM(s) IV Intermittent every 24 hours  furosemide    Tablet 40 milliGRAM(s) Oral daily  influenza  Vaccine (HIGH DOSE) 0.5 milliLiter(s) IntraMuscular once  montelukast 10 milliGRAM(s) Oral at bedtime  multivitamin/minerals 1 Tablet(s) Oral daily  naloxone Injectable 0.4 milliGRAM(s) IV Push once  pantoprazole  Injectable 40 milliGRAM(s) IV Push every 12 hours  polyethylene glycol 3350 17 Gram(s) Oral daily  rivaroxaban 20 milliGRAM(s) Oral daily  senna 2 Tablet(s) Oral at bedtime  thiamine 100 milliGRAM(s) Oral daily         74-year-old female with past medical history of DM, TAVR 2024 on Plavix , A-fib on Xarelto, CHF, HLD, PVD, b/l LE venous insufficiency, bilateral CFA stenoses s/p Rt SFA endarterectomy on 24 complicated by postoperative seroma requiring irrigation and debridement and wound VAC placement Seen on  for dislodgment of wound VAC tubing presents for evaluation of fever/chills and rigors since last night. Patient reports she had felt like her usual self since her last discharge until today. She had acute onset subjective fevers, chills, rigors d/t which she came to ED. She was discharged from  on  with a course of ciprofloxacin which she reports being compliant with, reports having "5 pills" left in the course. She denies any N/V, cough, sore throat, rhinitis, chest pain, rash, diarrhea or dysuria.   In ED patient febrile with Tmax of 101.9, Soft BP in 90s/40-50s, HR in 70s. CBC noted for WBC 21, H/H 5.8/19, Plt 441. CMP noted for BUN/Cr 28/1.04. Lactate 2.5 > 3.7. F/u/COVID negative. UA cloudy, with trace ketones, mod leuks, pos nitrites, 158 wbcs. Guaiac positive. CTA of abdomen and b/l LE negative for acute hemorrhage. CXR read as "grossly clear lungs". Patient met sepsis criteria, cultures obtained and patient recieved broad spectrum antibiotics with vancomycin and ceftriaxone in ED. Patient also recieved 2L NS and ordered for 2 U pRBCs. Patient admitted to  for sepsis and symptomatic anemia.            Additional results/Imaging, I have personally reviewed:    LABS:All other systems reviewed and found to be negative with the exception of what has been described above.    2/3 denies sob, no CP, has r groin drain removed today , has r groin pain controlled with diluaidid    PHYSICAL EXAM:    Daily     Daily Weight in k.4 (2025 00:53)    ICU Vital Signs Last 24 Hrs  T(C): 36.4 (2025 17:22), Max: 36.7 (2025 08:59)  T(F): 97.5 (2025 17:22), Max: 98 (2025 08:59)  HR: 67 (2025 17:22) (55 - 67)  BP: 129/54 (2025 17:22) (128/43 - 133/69)  BP(mean): --  ABP: --  ABP(mean): --  RR: 18 (2025 17:22) (18 - 18)  SpO2: 100% (2025 17:22) (97% - 100%)    O2 Parameters below as of 2025 08:59  Patient On (Oxygen Delivery Method): room air  PHYSICAL EXAM:  Constitutional: NAD, awake and alert, obese, chronically ill appearing   HEENT: PERRLA, EOMI, MMM  Respiratory: Breath sounds are clear bilaterally, No wheezing, rales or rhonchi  Cardiovascular: S1 and S2, RRR, no murmurs, gallops or rubs  Gastrointestinal: +BS, soft, non-tender, non-distended, no CVA tenderness  Extremities: No peripheral edema, +DP pulses b/l  Neurological: A&O x 3, no focal deficitsMusculoskeletal: 5/5 strength b/l upper and lower extremities  Skin: Chronic LE venous stasis changes, +R groin wound foul smelling, skin warm and dry      labs reviewed                                            9.9    4.94  )-----------( 387      ( 2025 07:02 )             33.0       CBC Full  -  ( 2025 07:02 )  WBC Count : 4.94 K/uL  RBC Count : 3.87 M/uL  Hemoglobin : 9.9 g/dL  Hematocrit : 33.0 %  Platelet Count - Automated : 387 K/uL  Mean Cell Volume : 85.3 fl  Mean Cell Hemoglobin : 25.6 pg  Mean Cell Hemoglobin Concentration : 30.0 g/dL  Auto Neutrophil # : 2.84 K/uL  Auto Lymphocyte # : 1.13 K/uL  Auto Monocyte # : 0.55 K/uL  Auto Eosinophil # : 0.32 K/uL  Auto Basophil # : 0.07 K/uL  Auto Neutrophil % : 57.5 %  Auto Lymphocyte % : 22.9 %  Auto Monocyte % : 11.1 %  Auto Eosinophil % : 6.5 %  Auto Basophil % : 1.4 %          133[L]  |  99  |  15  ----------------------------<  103[H]  3.9   |  30  |  0.75    Ca    9.4      2025 07:02                      Urinalysis Basic - ( 2025 07:02 )    Color: x / Appearance: x / SG: x / pH: x  Gluc: 103 mg/dL / Ketone: x  / Bili: x / Urobili: x   Blood: x / Protein: x / Nitrite: x   Leuk Esterase: x / RBC: x / WBC x   Sq Epi: x / Non Sq Epi: x / Bacteria: x            MEDICATIONS  (STANDING):  aMIOdarone    Tablet 200 milliGRAM(s) Oral daily  atorvastatin 10 milliGRAM(s) Oral at bedtime  clopidogrel Tablet 75 milliGRAM(s) Oral daily  ertapenem  IVPB 1000 milliGRAM(s) IV Intermittent every 24 hours  furosemide    Tablet 40 milliGRAM(s) Oral daily  influenza  Vaccine (HIGH DOSE) 0.5 milliLiter(s) IntraMuscular once  montelukast 10 milliGRAM(s) Oral at bedtime  multivitamin/minerals 1 Tablet(s) Oral daily  naloxone Injectable 0.4 milliGRAM(s) IV Push once  pantoprazole  Injectable 40 milliGRAM(s) IV Push every 12 hours  polyethylene glycol 3350 17 Gram(s) Oral daily  rivaroxaban 20 milliGRAM(s) Oral daily  senna 2 Tablet(s) Oral at bedtime  thiamine 100 milliGRAM(s) Oral daily

## 2025-02-03 NOTE — DISCHARGE NOTE PROVIDER - PROVIDER TOKENS
PROVIDER:[TOKEN:[[id number]]],PROVIDER:[TOKEN:[[id number]]],PROVIDER:[TOKEN:[[id number]]]

## 2025-02-04 ENCOUNTER — TRANSCRIPTION ENCOUNTER (OUTPATIENT)
Age: 75
End: 2025-02-04

## 2025-02-04 VITALS
SYSTOLIC BLOOD PRESSURE: 126 MMHG | TEMPERATURE: 98 F | OXYGEN SATURATION: 100 % | DIASTOLIC BLOOD PRESSURE: 46 MMHG | HEART RATE: 64 BPM | RESPIRATION RATE: 18 BRPM

## 2025-02-04 LAB
HCT VFR BLD CALC: 34.3 % — LOW (ref 34.5–45)
HGB BLD-MCNC: 10.2 G/DL — LOW (ref 11.5–15.5)
MCHC RBC-ENTMCNC: 25.6 PG — LOW (ref 27–34)
MCHC RBC-ENTMCNC: 29.7 G/DL — LOW (ref 32–36)
MCV RBC AUTO: 86 FL — SIGNIFICANT CHANGE UP (ref 80–100)
PLATELET # BLD AUTO: 407 K/UL — HIGH (ref 150–400)
RBC # BLD: 3.99 M/UL — SIGNIFICANT CHANGE UP (ref 3.8–5.2)
RBC # FLD: 16.7 % — HIGH (ref 10.3–14.5)
WBC # BLD: 6.36 K/UL — SIGNIFICANT CHANGE UP (ref 3.8–10.5)
WBC # FLD AUTO: 6.36 K/UL — SIGNIFICANT CHANGE UP (ref 3.8–10.5)

## 2025-02-04 PROCEDURE — 99233 SBSQ HOSP IP/OBS HIGH 50: CPT

## 2025-02-04 PROCEDURE — 71045 X-RAY EXAM CHEST 1 VIEW: CPT | Mod: 26

## 2025-02-04 RX ADMIN — ERTAPENEM SODIUM 120 MILLIGRAM(S): 1 INJECTION, POWDER, LYOPHILIZED, FOR SOLUTION INTRAMUSCULAR; INTRAVENOUS at 14:41

## 2025-02-04 RX ADMIN — AMIODARONE HYDROCHLORIDE 200 MILLIGRAM(S): 50 INJECTION, SOLUTION INTRAVENOUS at 10:17

## 2025-02-04 RX ADMIN — Medication 75 MILLIGRAM(S): at 10:18

## 2025-02-04 RX ADMIN — POLYETHYLENE GLYCOL 3350 17 GRAM(S): 17 POWDER, FOR SOLUTION ORAL at 10:17

## 2025-02-04 RX ADMIN — ONDANSETRON 4 MILLIGRAM(S): 4 TABLET, ORALLY DISINTEGRATING ORAL at 08:38

## 2025-02-04 RX ADMIN — Medication 100 MILLIGRAM(S): at 10:17

## 2025-02-04 RX ADMIN — Medication 40 MILLIGRAM(S): at 10:18

## 2025-02-04 RX ADMIN — PANTOPRAZOLE 40 MILLIGRAM(S): 20 TABLET, DELAYED RELEASE ORAL at 05:40

## 2025-02-04 RX ADMIN — RIVAROXABAN 20 MILLIGRAM(S): 20 TABLET, FILM COATED ORAL at 14:41

## 2025-02-04 RX ADMIN — PANTOPRAZOLE 40 MILLIGRAM(S): 20 TABLET, DELAYED RELEASE ORAL at 17:37

## 2025-02-04 RX ADMIN — HYDROMORPHONE HYDROCHLORIDE 0.5 MILLIGRAM(S): 4 INJECTION, SOLUTION INTRAMUSCULAR; INTRAVENOUS; SUBCUTANEOUS at 17:37

## 2025-02-04 RX ADMIN — Medication 1 TABLET(S): at 10:17

## 2025-02-04 NOTE — PROGRESS NOTE ADULT - ASSESSMENT
74-year-old Female with h/o DM type 2, TAVR 8/2024 on Plavix , A-fib on Xarelto, CHF, HLD, PVD, b/l LE venous insufficiency, bilateral CFA stenoses s/p Rt SFA endarterectomy on 12/17/24, recently hospitalized on 1/3 with right inguinal cellulitis/ postsurgical infection s/p right SFA endarterectomy with underlying inguinal collection treated with vancomycin 750 mg IV q12h, cefepime 2 gm IV q12h and metronidazole 500 mg IV q8h, then PO abx with PO cipro was admitted on 1/22 fever to Tmax of 101.9F, Soft BP in 90s/40-50s. Patient met sepsis criteria, cultures obtained and patient received vancomycin IV and ceftriaxone, then meropenem.    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 UTI with ESBL E.coli resolving  #PVD  #Allergy to PCN  -cultures reviewed  -s/p meropenem 1 gm IV q8h and caspofungin 50 mg IV qd # 11  -on ertapenem 1 gm IV qd # 1-2  -tolerating abx well so far; no side effects noted  -monitor closely in ej of PCN allergy history  -surgical evaluation appreciated - s/p washout   -local wound care  -cardiology evaluation appreciated   -plan for 30 more days to treat for probable SBE  -continue abx coverage   -repeat BC x 2 noted   -home IV abx setup in progress; case reviewed with case management; orders reviewed and called in to pharmacist with infusion company; awaiting insurance approval  -PICC line and outpatient abx therapy for 30 more days  -weekly labs  -monitor temps  -f/u CBC  -supportive care  2. Other issues:   -care per medicine    d/w medicine team and Dr. Colin    The patient may need prolonged IV antimicrobial therapy

## 2025-02-04 NOTE — PROGRESS NOTE ADULT - REASON FOR ADMISSION
fever
Sepsis
fever
fever/chills
fever
Sepsis
fever
fever
fever/chills
Sepsis

## 2025-02-04 NOTE — DISCHARGE NOTE NURSING/CASE MANAGEMENT/SOCIAL WORK - PATIENT PORTAL LINK FT
You can access the FollowMyHealth Patient Portal offered by [facility] by registering at the following website: [email or web address]. By joining [employer]’s FollowMyHealth portal, you will also be able to view your health information using other applications (apps) compatible with our system.

## 2025-02-04 NOTE — ADVANCED PRACTICE NURSE CONSULT - ASSESSMENT
Procedure Note: Vascular Access  Procedure Name: RUE PICC Placement  Time out: Patient’s first and last name, , MRN, procedure and correct site confirmed prior to start of procedure.  Procedure Date and Time: 2023    1405  Informed Consent: Benefits, risks, and possible complications of procedure explained to patient.  Patient verbalized understanding and gave written consent.  Local Anesthesia: 1% Lidocaine subdermal  Procedure findings and Details:  Successful placement of RUE 4 Rf Single Lumen PICC (Xcela PICC with PASV Valve Technology Lot # [id number]) via basilic vein inserted under ultrasound guidance.  PICC line trimmed to 42cm.   Follow up: CXR ordered to confirm tip placement.  Report given to District RN Daniel.

## 2025-02-04 NOTE — ED ADULT TRIAGE NOTE - MEANS OF ARRIVAL
Correct serum vials verified by patient and nurse. After obtaining consent, and per orders of Dr Marrufo, injections of allergy serum given by CYNTHIA Gonzalez. Patient instructed to remain in clinic for 30 minutes after injection, and to report any adverse reactions to me immediately. No change in patient status post injection.  
ambulatory

## 2025-02-04 NOTE — DISCHARGE NOTE NURSING/CASE MANAGEMENT/SOCIAL WORK - FINANCIAL ASSISTANCE
[facility] provides services at a reduced cost to those who are determined to be eligible through [facility]’s financial assistance program. Information regarding [facility]’s financial assistance program can be found by going to [email or web address] or by calling [phone].

## 2025-02-04 NOTE — PROGRESS NOTE ADULT - PROVIDER SPECIALTY LIST ADULT
Hospitalist
Infectious Disease
Palliative Care
Vascular Surgery
Gastroenterology
Hospitalist
Infectious Disease
Infectious Disease
Vascular Surgery
Gastroenterology
Hospitalist
Infectious Disease
Infectious Disease
Vascular Surgery
Cardiology
Infectious Disease
Cardiology
Cardiology
Palliative Care

## 2025-02-04 NOTE — PROGRESS NOTE ADULT - SUBJECTIVE AND OBJECTIVE BOX
SURGERY DAILY PROGRESS NOTE:     Subjective:  Patient seen and examined at bedside during am rounds.   Pervena changed yesterday, wound healing appropriately, BELGICA removed    Objective:    MEDICATIONS  (STANDING):  aMIOdarone    Tablet 200 milliGRAM(s) Oral daily  atorvastatin 10 milliGRAM(s) Oral at bedtime  clopidogrel Tablet 75 milliGRAM(s) Oral daily  ertapenem  IVPB 1000 milliGRAM(s) IV Intermittent every 24 hours  furosemide    Tablet 40 milliGRAM(s) Oral daily  influenza  Vaccine (HIGH DOSE) 0.5 milliLiter(s) IntraMuscular once  montelukast 10 milliGRAM(s) Oral at bedtime  multivitamin/minerals 1 Tablet(s) Oral daily  naloxone Injectable 0.4 milliGRAM(s) IV Push once  pantoprazole  Injectable 40 milliGRAM(s) IV Push every 12 hours  polyethylene glycol 3350 17 Gram(s) Oral daily  rivaroxaban 20 milliGRAM(s) Oral daily  senna 2 Tablet(s) Oral at bedtime  thiamine 100 milliGRAM(s) Oral daily    MEDICATIONS  (PRN):  acetaminophen     Tablet .. 650 milliGRAM(s) Oral every 6 hours PRN Temp greater or equal to 38C (100.4F)  aluminum hydroxide/magnesium hydroxide/simethicone Suspension 30 milliLiter(s) Oral every 4 hours PRN Dyspepsia  artificial  tears Solution 1 Drop(s) Both EYES every 2 hours PRN Dry Eyes  bisacodyl 5 milliGRAM(s) Oral daily PRN Constipation  HYDROmorphone  Injectable 0.5 milliGRAM(s) IV Push every 4 hours PRN Severe Pain (7 - 10)  melatonin 3 milliGRAM(s) Oral at bedtime PRN Insomnia  ondansetron Injectable 4 milliGRAM(s) IV Push every 6 hours PRN Nausea and/or Vomiting  zolpidem 5 milliGRAM(s) Oral at bedtime PRN Insomnia      Vital Signs Last 24 Hrs  T(C): 36.7 (04 Feb 2025 00:48), Max: 36.7 (03 Feb 2025 08:59)  T(F): 98.1 (04 Feb 2025 00:48), Max: 98.1 (04 Feb 2025 00:48)  HR: 55 (04 Feb 2025 00:48) (55 - 67)  BP: 130/49 (04 Feb 2025 00:48) (129/54 - 133/69)  BP(mean): --  RR: 18 (04 Feb 2025 00:48) (18 - 18)  SpO2: 96% (04 Feb 2025 00:48) (96% - 100%)    Parameters below as of 04 Feb 2025 00:48  Patient On (Oxygen Delivery Method): room air      Gen: well-appearing, in no acute distress  CV: pulse regularly present   Resp: airway patent, non-labored breathing  Abd: soft, NTND; no rebound or guarding. R groin wound pervena intact  Ext: no cyanosis or edema        I&O's Detail    02 Feb 2025 07:01  -  03 Feb 2025 07:00  --------------------------------------------------------  IN:  Total IN: 0 mL    OUT:    Drain (mL): 10 mL    Voided (mL): 500 mL  Total OUT: 510 mL    Total NET: -510 mL          Daily     Daily     LABS:                        9.9    4.94  )-----------( 387      ( 03 Feb 2025 07:02 )             33.0     02-03    133[L]  |  99  |  15  ----------------------------<  103[H]  3.9   |  30  |  0.75    Ca    9.4      03 Feb 2025 07:02        Urinalysis Basic - ( 03 Feb 2025 07:02 )    Color: x / Appearance: x / SG: x / pH: x  Gluc: 103 mg/dL / Ketone: x  / Bili: x / Urobili: x   Blood: x / Protein: x / Nitrite: x   Leuk Esterase: x / RBC: x / WBC x   Sq Epi: x / Non Sq Epi: x / Bacteria: x        RADIOLOGY & ADDITIONAL STUDIES:    ASSESSMENT/PLAN:

## 2025-02-04 NOTE — PROGRESS NOTE ADULT - SUBJECTIVE AND OBJECTIVE BOX
Date of service: 02-04-25 @ 10:58    Lying in bed in NAD  Aime nausea today  Feels weaker  No fever    ROS: no fever or chills; denies dizziness, no HA, no SOB or cough, no abdominal pain, no diarrhea or constipation; no dysuria, no legs pain, no rashes    MEDICATIONS  (STANDING):  aMIOdarone    Tablet 200 milliGRAM(s) Oral daily  atorvastatin 10 milliGRAM(s) Oral at bedtime  clopidogrel Tablet 75 milliGRAM(s) Oral daily  ertapenem  IVPB 1000 milliGRAM(s) IV Intermittent every 24 hours  furosemide    Tablet 40 milliGRAM(s) Oral daily  influenza  Vaccine (HIGH DOSE) 0.5 milliLiter(s) IntraMuscular once  montelukast 10 milliGRAM(s) Oral at bedtime  multivitamin/minerals 1 Tablet(s) Oral daily  naloxone Injectable 0.4 milliGRAM(s) IV Push once  pantoprazole  Injectable 40 milliGRAM(s) IV Push every 12 hours  polyethylene glycol 3350 17 Gram(s) Oral daily  rivaroxaban 20 milliGRAM(s) Oral daily  senna 2 Tablet(s) Oral at bedtime  thiamine 100 milliGRAM(s) Oral daily    Vital Signs Last 24 Hrs  T(C): 36.5 (04 Feb 2025 08:09), Max: 36.7 (04 Feb 2025 00:48)  T(F): 97.7 (04 Feb 2025 08:09), Max: 98.1 (04 Feb 2025 00:48)  HR: 59 (04 Feb 2025 08:09) (55 - 67)  BP: 130/62 (04 Feb 2025 08:09) (129/54 - 130/62)  BP(mean): --  RR: 18 (04 Feb 2025 08:09) (18 - 18)  SpO2: 96% (04 Feb 2025 08:09) (96% - 100%)    Parameters below as of 04 Feb 2025 08:09  Patient On (Oxygen Delivery Method): room air     Physical exam:    Constitutional:  No acute distress  HEENT: NC/AT, EOMI, PERRLA, conjunctivae clear; ears and nose atraumatic; pharynx benign  Neck: supple; thyroid not palpable  Back: no tenderness  Respiratory: respiratory effort normal; clear to auscultation  Cardiovascular: S1S2 regular, no murmurs  Abdomen: soft, not tender, not distended, positive BS; no liver or spleen organomegaly  Genitourinary: no suprapubic tenderness  Lymphatic: no LN palpable  Musculoskeletal: no muscle tenderness, no joint swelling or tenderness  Extremities: no pedal edema  Right inguinal postsurgical wound improving  Neurological/ Psychiatric: AxOx3, judgement and insight normal; moving all extremities  Skin: no rashes; no palpable lesions    Labs: reviewed                        9.9    4.94  )-----------( 387      ( 03 Feb 2025 07:02 )             33.0     02-03    133[L]  |  99  |  15  ----------------------------<  103[H]  3.9   |  30  |  0.75    Ca    9.4      03 Feb 2025 07:02                        9.1    5.36  )-----------( 345      ( 31 Jan 2025 07:55 )             30.9     01-31    136  |  98  |  19  ----------------------------<  89  3.8   |  35[H]  |  0.67    Ca    8.9      31 Jan 2025 07:55                        9.3    6.00  )-----------( 331      ( 29 Jan 2025 07:30 )             31.0     01-29    134[L]  |  99  |  12  ----------------------------<  128[H]  3.7   |  32[H]  |  0.65    Ca    8.8      29 Jan 2025 07:30  Phos  3.4     01-28  Mg     1.6     01-28                        8.9    10.05 )-----------( 306      ( 27 Jan 2025 06:51 )             29.2     01-27    136  |  99  |  8   ----------------------------<  92  3.4[L]   |  31  |  0.62    Ca    9.1      27 Jan 2025 06:51               8.6    12.66 )-----------( 282      ( 25 Jan 2025 03:39 )             27.6     01-25    134[L]  |  104  |  16  ----------------------------<  110[H]  3.8   |  26  |  0.70    Ca    8.1[L]      25 Jan 2025 03:39  Mg     1.7     01-25    TPro  6.0  /  Alb  2.2[L]  /  TBili  0.6  /  DBili  x   /  AST  20  /  ALT  9[L]  /  AlkPhos  54  01-24                        7.9    17.46 )-----------( 286      ( 23 Jan 2025 06:26 )             25.7     01-23    132[L]  |  105  |  25[H]  ----------------------------<  96  4.7   |  22  |  1.21    Ca    8.3[L]      23 Jan 2025 06:26  Phos  4.3     01-23  Mg     1.7     01-23    TPro  6.9  /  Alb  2.6[L]  /  TBili  1.0  /  DBili  x   /  AST  37  /  ALT  9[L]  /  AlkPhos  61  01-23     LIVER FUNCTIONS - ( 23 Jan 2025 06:26 )  Alb: 2.6 g/dL / Pro: 6.9 gm/dL / ALK PHOS: 61 U/L / ALT: 9 U/L / AST: 37 U/L / GGT: x           Urinalysis with Rflx Culture (01-22 @ 11:20)  Urine Appearance: Cloudy  Protein, Urine: Negative mg/dL  Urine Microscopic-Add On (NC) (01-22 @ 11:20)  White Blood Cell - Urine: 158 /HPF  Red Blood Cell - Urine: 1 /HPF    1/8 tissue c/s ENFA and CAGL    Urinalysis with Rflx Culture (collected 22 Jan 2025 11:20)    Culture - Blood (collected 22 Jan 2025 11:20)  Source: .Blood BLOOD  Gram Stain (23 Jan 2025 02:03):    Growth in aerobic bottle: Gram Negative Rods    Growth in anaerobic bottle: Gram Negative Rods  Final Report (24 Jan 2025 11:49):    Growth in aerobic and anaerobic bottles: Escherichia coli ESBL    Direct identification is available within approximately 3-5    hours either by Blood Panel Multiplexed PCR or Direct    MALDI-TOF. Details: [email or web address]  Organism: Blood Culture PCR  Escherichia coli ESBL (24 Jan 2025 11:49)  Organism: Escherichia coli ESBL (24 Jan 2025 11:49)      Method Type: TOYA      -  Ampicillin: R >16 These ampicillin results predict results for amoxicillin      -  Ampicillin/Sulbactam: R 16/8      -  Aztreonam: R >16      -  Cefazolin: R >16      -  Cefepime: R >16      -  Ceftriaxone: R >32      -  Ciprofloxacin: R >2      -  Ertapenem: S <=0.5      -  Gentamicin: S <=2      -  Imipenem: S <=1      -  Levofloxacin: R >4      -  Meropenem: S <=1      -  Piperacillin/Tazobactam: R <=8      -  Tobramycin: S <=2      -  Trimethoprim/Sulfamethoxazole: S <=0.5/9.5  Organism: Blood Culture PCR (24 Jan 2025 11:49)      Method Type: PCR      -  Escherichia coli: Detec      -  ESBL: Detec      -  CTX-M Resistance Marker: Detec    Culture - Urine (collected 22 Jan 2025 11:20)  Source: Catheterized None  Final Report (24 Jan 2025 11:54):    >100,000 CFU/ml Escherichia coli ESBL    >100,000 CFU/ml Escherichia coli ESBL #2    Multiple Morphological Strains  Organism: Escherichia coli ESBL  Escherichia coli ESBL (24 Jan 2025 11:54)  Organism: Escherichia coli ESBL (24 Jan 2025 11:54)      Method Type: TOYA      -  Ampicillin: R >16 These ampicillin results predict results for amoxicillin      -  Ampicillin/Sulbactam: I 16/8      -  Aztreonam: R >16      -  Cefazolin: R >16 For uncomplicated UTI with K. pneumoniae, E. coli, or P. mirablis: TOYA <=16 is sensitive and TOYA >=32 is resistant. This also predicts results for oral agents cefaclor, cefdinir, cefpodoxime, cefprozil, cefuroxime axetil, cephalexin and locarbef for uncomplicated UTI. Note that some isolates may be susceptible to these agents while testing resistant to cefazolin.      -  Cefepime: R >16      -  Ceftriaxone: R >32      -  Cefuroxime: R >16      -  Ciprofloxacin: R >2      -  Ertapenem: S <=0.5      -  Gentamicin: S <=2      -  Imipenem: S <=1      -  Levofloxacin: R >4      -  Meropenem: S <=1      -  Nitrofurantoin: S <=32 Should not be used to treat pyelonephritis      -  Piperacillin/Tazobactam: S <=8      -  Tobramycin: S <=2      -  Trimethoprim/Sulfamethoxazole: S <=0.5/9.5  Organism: Escherichia coli ESBL (24 Jan 2025 11:54)      Method Type: TOYA      -  Ampicillin: R >16 These ampicillin results predict results for amoxicillin      -  Ampicillin/Sulbactam: R >16/8      -  Aztreonam: R >16      -  Cefazolin: R >16 For uncomplicated UTI with K. pneumoniae, E. coli, or P. mirablis: TOYA <=16 is sensitive and TOYA >=32 is resistant. This also predicts results for oral agents cefaclor, cefdinir, cefpodoxime, cefprozil, cefuroxime axetil, cephalexin and locarbef for uncomplicated UTI. Note that some isolates may be susceptible to these agents while testing resistant to cefazolin.      -  Cefepime: R >16      -  Ceftriaxone: R >32      -  Cefuroxime: R >16      -  Ciprofloxacin: R >2      -  Ertapenem: S <=0.5      -  Gentamicin: S <=2      -  Imipenem: S <=1      -  Levofloxacin: R >4      -  Meropenem: S <=1      -  Nitrofurantoin: S <=32 Should not be used to treat pyelonephritis      -  Piperacillin/Tazobactam: S <=8      -  Tobramycin: S <=2      -  Trimethoprim/Sulfamethoxazole: S <=0.5/9.5    Culture - Blood (collected 27 Jan 2025 06:55)  Source: .Blood BLOOD  Preliminary Report (28 Jan 2025 13:01):    No growth at 24 hours    Culture - Blood (collected 27 Jan 2025 06:51)  Source: .Blood BLOOD  Preliminary Report (28 Jan 2025 13:01):    No growth at 24 hours    Radiology: all available radiological tests reviewed    Advanced directives addressed: full resuscitation

## 2025-02-04 NOTE — PROGRESS NOTE ADULT - ASSESSMENT
74-year-old F, with PMHx of DM, TAVR 8/2024 on Plavix , A-fib on Xarelto, CHF, HLD, PVD, b/l LE venous insufficiency, bilateral CFA stenoses s/p Rt SFA endarterectomy on 12/17/24 with R groin surgical site infection.     #S/p R groin I and D with complex wound closure      74-year-old F, with PMHx of DM, TAVR 8/2024 on Plavix , A-fib on Xarelto, CHF, HLD, PVD, b/l LE venous insufficiency, bilateral CFA stenoses s/p Rt SFA endarterectomy on 12/17/24 with R groin surgical site infection.     #S/p R groin I and D with complex wound closure      PLAN  -continue plavix and Xarelto   -Monitor drain output> possible removal today   -f/u ID recs for abx coverage: on radha/caspofungin  -PT, OOBTC, encourage ambulation as tolerated  -Patient can be discharged from vascular POV, please give pt follow up this friday in wound care and next week Monday at Dr. Manjarrez's vascular clinic  -Rest of care per primary team

## 2025-02-08 LAB
CULTURE RESULTS: SIGNIFICANT CHANGE UP
SPECIMEN SOURCE: SIGNIFICANT CHANGE UP

## 2025-02-10 ENCOUNTER — APPOINTMENT (OUTPATIENT)
Dept: VASCULAR SURGERY | Facility: CLINIC | Age: 75
End: 2025-02-10
Payer: MEDICARE

## 2025-02-10 VITALS
OXYGEN SATURATION: 99 % | DIASTOLIC BLOOD PRESSURE: 56 MMHG | HEART RATE: 63 BPM | BODY MASS INDEX: 40.59 KG/M2 | SYSTOLIC BLOOD PRESSURE: 114 MMHG | HEIGHT: 61 IN | WEIGHT: 215 LBS

## 2025-02-10 PROCEDURE — 99024 POSTOP FOLLOW-UP VISIT: CPT

## 2025-02-10 PROCEDURE — 93922 UPR/L XTREMITY ART 2 LEVELS: CPT

## 2025-02-11 NOTE — CDI QUERY NOTE - NSCDIOTHERTXTBX_GEN_ALL_CORE_HH
Clinical documentation and/or evidence of the patient’s presentation, evaluation, and medical management, as evidenced below, may support a cause and effect link that is not documented in the medical record.  In order to ensure accurate coding and accuracy of the clinical record, the documentation in this patient’s medical record requires additional clarification.    If you think the supporting documentation and/or clinical evidence supports a cause and effect link, please include more specific documentation associated with these findings in your progress note and/or discharge summary.      Please clarify if there is a relationship between the __sepsis  and ESBL E. Coli bacteremia , and UTI Right inguinal postsurgical subcutaneous abscess  s/p right SFA endarterectomy history chronic devices prima fit catheters manipulations    -Sepsis  due to  ESBL E. Coli bacteremia associated with  UTI due to frequent chronic (prima fit) catheter manipulations   -Sepsis  due to  ESBL E. Coli bacteremia associated with  UTI not Coronado catheter related    -Sepsis associated with right inguinal postsurgical subcutaneous abscess due to  right SFA endarterectomy  -Sepsis due to subacute prosthetic valve endocarditis  -Other please specify    Supporting documentation and/or clinical evidence:     Critical Care 1/23- Patient is complaining of sacral pain and discomfort from the Primafit device. Blood cultures are already growing ESBL e coli.      DC summary--surgical evaluation appreciated - s/p washout --> Dr. Manjarrez felt there is no further wound infection change abx to ertapenem 1 gm IV qd for 31 more days to treat for probable SBEwill need PICC line and outpatient abx therapy for 31 more days- CT RLE as above-  - BCx and UCx 1/22 both growing ESBL E. Coli Cardiology consulted,    ED-Present on Admission - Urethral Catheter: No.Principal Discharge Dx Symptomatic anemia. Secondary Discharge Dx Urinary tract infection. Secondary Discharge Dx Lower GI bleed.    Right groin wound. -S/p wound debridement w penrose drain and wound vac placement on 1/8/25 replaced 1/11.-vascular following    ID-b/l LE venous insufficiency, bilateral CFA stenoses s/p Rt SFA endarterectomy on 12/17/24, recently hospitalized on 1/3 with right inguinal cellulitis/ postsurgical infection s/p right SFA endarterectomy with underlying inguinal collection treated with vancomycin 750 mg IV q12h, cefepime 2 gm IV q12h and metronidazole 500 mg IV q8h, then PO abx with PO cipro was admitted on 1/22 fever to Tmax of 101.9F, Soft BP in 90s/40-50s. 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 TAVR#Pyuria. UTI with ESBL E.coli resolving#PVD    Vascular - DM, TAVR 8/2024 on Plavix , A-fib on Xarelto, CHF, HLD, PVD, b/l LE venous insufficiency, bilateral CFA stenoses s/p Rt SFA endarterectomy on 12/17/24 with R groin surgical site infection.  #S/p R groin I and D with complex wound closure    PICC line-Procedure findings and Details:  Successful placement of RUE 4 Rf Single Lumen PICC (Xcela PICC with PASV Valve Technology Lot # [id number]) via basilic vein inserted under ultrasound guidance.  PICC line trimmed to 42cm.     Medicine 2/3 Skin: Chronic LE venous stasis changes, +R groin wound foul smelling, skin warm and dry-#Sepsis 2/2 ESBL E. Coli bacteremia , ESBL E. Coli UTI#Right inguinal postsurgical bacterial and fungal subcutaneous  infection s/p right SFA endarterectomy#R inguinal  subcutaneous abscesscollection with ENFA and CAGL Meropenem and caspofungin,  s/p R groin I & D wash out 1/28,  Prevena  changed today  arrange for home infusion to administer the antibiotics. per cardio  ASHLEY is  not required. -surgical evaluation appreciated - s/p washout --> Dr. Manjarrez felt there is no further wound infectionchange abx to ertapenem 1 gm IV qd for 31 more days to treat for probable SBEwill need PICC line and outpatient abx therapy for 31 more days- CT RLE as above-  - BCx and UCx 1/22 both growing ESBL E. Coli Cardiology consulted,    Culture - Urine (collected 22 Jan 2025 11:20)Source: Catheterized NoneFinal Report (24 Jan 2025 11:54):  >100,000 CFU/ml Escherichia coli ESBL  >100,000 CFU/ml Escherichia coli ESBL #2

## 2025-02-12 NOTE — CHART NOTE - NSCHARTNOTEFT_GEN_A_CORE
CDI clarification  Sepsis  due to  ESBL E. Coli bacteremia associated with  UTI not Coronado catheter related
HPI:  74-year-old female with past medical history of DM, TAVR 8/2024 on Plavix , A-fib on Xarelto, CHF, HLD, PVD, b/l LE venous insufficiency, bilateral CFA stenoses s/p Rt SFA endarterectomy on 12/17/24 complicated by postoperative seroma requiring irrigation and debridement and wound VAC placement Seen on January 18 for dislodgment of wound VAC tubing presents for evaluation of fever/chills and rigors since last night. Patient reports she had felt like her usual self since her last discharge until today.   (22 Jan 2025 18:55)      PERTINENT PMH REVIEWED:  [ X ] YES [ ] NO           Primary Contact:   HCP in One Content:  Manny alternate son Jony    HCP [ X ] Surrogate [   ] Guardian [   ]    Mental Status: [ X ] Alert  [  X  ] Oriented [  ] Confused [  ] Lethargic [  ]  Concerns of Depression [  ]- expressed feeling depressed at times due to many aspects of her life changing because of her health   Anxiety [   ]- Anxious at times   Baseline ADLs (prior to admission):  Independent [ X ] moderately [ ] fully   Dependent   [ ] moderately [ ]fully    Anticipated Grief: Patient [ X ] Family [  ]    Caregiver [place] Assessed: Yes [  X  ] No [  ]    Christian: [employer]     Spiritual Concerns: Not identified,  remains available     Goals of Care: Comfort [  ] Rehabilitation [  ] Curative [  ] Life Prolonging [ X ]    Previous Services: None     ADVANCE DIRECTIVES:  Full Code     Anticipated D/C Plan:  to be determined, pt. reports she wants to return home and not go to [facility]                      Summary:    This SW met with pt. at bedside to follow up and provide support. Role of Palliative SW explained. Pt. was living home with her  prior to admission. She reports she was managing at home. Pt. shared that since April when pt. had a Valve replacement her life has been challenging with her health. She shared that she used to work at a [employer] and can no longer do so. She discussed feeling depressed and anxious at times because of how much her life has changed. Feelings explored and support provided. Pt. shared she had been at [facility] in the past for rehab and she never wants to go back to [facility]. She reports she wants to return home upon d/c. Plan to be determined, pt. requesting to return home. No limits currently set. Emotional support provided. Our team will continue to follow.

## 2025-02-15 NOTE — DISCHARGE NOTE PROVIDER - NSDCMRMEDTOKEN_GEN_ALL_CORE_FT
albuterol 90 mcg/inh inhalation aerosol: 2 puff(s) inhaled every 4 hours, As needed, Shortness of Breath  amiodarone 100 mg oral tablet: orally once a day  apixaban 5 mg oral tablet: 1 tab(s) orally every 12 hours  cholestyramine 4 g/5 g oral powder for reconstitution: 1 packet(s) orally 2 times a day  ezetimibe 10 mg oral tablet: 1 tab(s) orally once a day (in the evening)  furosemide 40 mg oral tablet: 1 tab(s) orally once a day  gabapentin 400 mg oral capsule: 2 cap(s) orally 3 times a day  losartan 25 mg oral tablet: 1 tab(s) orally once a day  Mag-Ox 400 oral tablet: 1 tab(s) orally once a day  metFORMIN 1000 mg oral tablet: 1 tab(s) orally 2 times a day  metoprolol succinate 100 mg oral tablet, extended release: 1 tab(s) orally once a day  montelukast 10 mg oral tablet: 1 tab(s) orally once a day (at bedtime)  pravastatin 10 mg oral tablet: 1 tab(s) orally once a day (in the evening)  predniSONE 5 mg oral tablet: 1 tab(s) orally once a day  spironolactone 25 mg oral tablet: 1 tab(s) orally once a day  zolpidem 10 mg oral tablet: 1 tab(s) orally once a day (at bedtime)   Bed/Stretcher in lowest position, wheels locked, appropriate side rails in place/Call bell, personal items and telephone in reach/Instruct patient to call for assistance before getting out of bed/chair/stretcher/Non-slip footwear applied when patient is off stretcher/[place] to call system/Physically safe environment - no spills, clutter or unnecessary equipment/Purposeful proactive rounding/Room/bathroom lighting operational, light cord in reach albuterol 90 mcg/inh inhalation aerosol: 2 puff(s) inhaled every 4 hours, As needed, Shortness of Breath  amiodarone 100 mg oral tablet: orally once a day  apixaban 5 mg oral tablet: 1 tab(s) orally every 12 hours  cholestyramine 4 g/5 g oral powder for reconstitution: 1 packet(s) orally 2 times a day  ezetimibe 10 mg oral tablet: 1 tab(s) orally once a day (in the evening)  furosemide 40 mg oral tablet: 1 tab(s) orally once a day  gabapentin 400 mg oral capsule: 2 cap(s) orally 3 times a day  losartan 25 mg oral tablet: 1 tab(s) orally once a day  Mag-Ox 400 oral tablet: 1 tab(s) orally once a day  metFORMIN 1000 mg oral tablet: 1 tab(s) orally 2 times a day  metoprolol succinate 50 mg oral tablet, extended release: 1 tab(s) orally once a day  montelukast 10 mg oral tablet: 1 tab(s) orally once a day (at bedtime)  pantoprazole 40 mg oral delayed release tablet: 1 tab(s) orally every 12 hours for 14 days then once daily for 6 weeks then stop  pravastatin 10 mg oral tablet: 1 tab(s) orally once a day (in the evening)  predniSONE 5 mg oral tablet: 1 tab(s) orally once a day  spironolactone 25 mg oral tablet: 1 tab(s) orally once a day  sucralfate 1 g/10 mL oral suspension: 10 milliliter(s) orally every 12 hours  zolpidem 10 mg oral tablet: 1 tab(s) orally once a day (at bedtime)

## 2025-02-19 ENCOUNTER — APPOINTMENT (OUTPATIENT)
Dept: VASCULAR SURGERY | Facility: CLINIC | Age: 75
End: 2025-02-19
Payer: MEDICARE

## 2025-02-19 VITALS
WEIGHT: 215 LBS | DIASTOLIC BLOOD PRESSURE: 78 MMHG | BODY MASS INDEX: 40.59 KG/M2 | OXYGEN SATURATION: 98 % | HEART RATE: 68 BPM | SYSTOLIC BLOOD PRESSURE: 137 MMHG | HEIGHT: 61 IN

## 2025-02-19 DIAGNOSIS — K65.1 PERITONEAL ABSCESS: ICD-10-CM

## 2025-02-19 DIAGNOSIS — I87.2 VENOUS INSUFFICIENCY (CHRONIC) (PERIPHERAL): ICD-10-CM

## 2025-02-19 DIAGNOSIS — R65.20 SEVERE SEPSIS WITHOUT SEPTIC SHOCK: ICD-10-CM

## 2025-02-19 DIAGNOSIS — Z79.891 LONG TERM (CURRENT) USE OF OPIATE ANALGESIC: ICD-10-CM

## 2025-02-19 DIAGNOSIS — A41.51 SEPSIS DUE TO ESCHERICHIA COLI [E. COLI]: ICD-10-CM

## 2025-02-19 DIAGNOSIS — I73.9 PERIPHERAL VASCULAR DISEASE, UNSPECIFIED: ICD-10-CM

## 2025-02-19 DIAGNOSIS — N39.0 URINARY TRACT INFECTION, SITE NOT SPECIFIED: ICD-10-CM

## 2025-02-19 DIAGNOSIS — Z79.4 LONG TERM (CURRENT) USE OF INSULIN: ICD-10-CM
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
## 2025-02-19 DIAGNOSIS — E86.1 HYPOVOLEMIA: ICD-10-CM

## 2025-02-19 DIAGNOSIS — Z79.01 LONG TERM (CURRENT) USE OF ANTICOAGULANTS: ICD-10-CM

## 2025-02-19 DIAGNOSIS — I48.0 PAROXYSMAL ATRIAL FIBRILLATION: ICD-10-CM

## 2025-02-19 DIAGNOSIS — I87.8 OTHER SPECIFIED DISORDERS OF VEINS: ICD-10-CM

## 2025-02-19 DIAGNOSIS — D62 ACUTE POSTHEMORRHAGIC ANEMIA: ICD-10-CM

## 2025-02-19 DIAGNOSIS — Z88.8 ALLERGY STATUS TO OTHER DRUGS, MEDICAMENTS AND BIOLOGICAL SUBSTANCES: ICD-10-CM

## 2025-02-19 DIAGNOSIS — I50.32 CHRONIC DIASTOLIC (CONGESTIVE) HEART FAILURE: ICD-10-CM

## 2025-02-19 DIAGNOSIS — Z91.040 LATEX ALLERGY STATUS: ICD-10-CM

## 2025-02-19 DIAGNOSIS — I33.0 ACUTE AND SUBACUTE INFECTIVE ENDOCARDITIS: ICD-10-CM

## 2025-02-19 DIAGNOSIS — F41.9 ANXIETY DISORDER, UNSPECIFIED: ICD-10-CM

## 2025-02-19 DIAGNOSIS — E11.51 TYPE 2 DIABETES MELLITUS WITH DIABETIC PERIPHERAL ANGIOPATHY WITHOUT GANGRENE: ICD-10-CM
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
## 2025-02-19 DIAGNOSIS — G47.00 INSOMNIA, UNSPECIFIED: ICD-10-CM

## 2025-02-19 DIAGNOSIS — Z88.0 ALLERGY STATUS TO PENICILLIN: ICD-10-CM

## 2025-02-19 DIAGNOSIS — G89.18 OTHER ACUTE POSTPROCEDURAL PAIN: ICD-10-CM

## 2025-02-19 DIAGNOSIS — Z16.12 EXTENDED SPECTRUM BETA LACTAMASE (ESBL) RESISTANCE: ICD-10-CM

## 2025-02-19 DIAGNOSIS — T81.42XA INFECTION FOLLOWING A PROCEDURE, DEEP INCISIONAL SURGICAL SITE, INITIAL ENCOUNTER: ICD-10-CM

## 2025-02-19 DIAGNOSIS — Z79.02 LONG TERM (CURRENT) USE OF ANTITHROMBOTICS/ANTIPLATELETS: ICD-10-CM

## 2025-02-19 DIAGNOSIS — I11.0 HYPERTENSIVE HEART DISEASE WITH HEART FAILURE: ICD-10-CM

## 2025-02-19 DIAGNOSIS — E78.5 HYPERLIPIDEMIA, UNSPECIFIED: ICD-10-CM

## 2025-02-19 PROCEDURE — 99024 POSTOP FOLLOW-UP VISIT: CPT

## 2025-02-22 NOTE — ED ADULT NURSE NOTE - NSICDXPASTMEDICALHX_GEN_ALL_CORE_FT
PAST MEDICAL HISTORY:  Atrial fibrillation     Cellulitis     Congestive heart failure     Diabetes mellitus type II, controlled     Dyspnea     HTN (hypertension)     Morbid obesity with BMI of 40.0-44.9, adult     Neuropathy     Peripheral venous insufficiency     Thyroid nodule        scheduled induction for IUGR

## 2025-02-25 ENCOUNTER — OUTPATIENT (OUTPATIENT)
Dept: OUTPATIENT SERVICES | Facility: HOSPITAL | Age: 75
LOS: 1 days | End: 2025-02-25
Payer: MEDICARE

## 2025-02-25 DIAGNOSIS — Z98.890 OTHER SPECIFIED POSTPROCEDURAL STATES: Chronic | ICD-10-CM

## 2025-02-25 DIAGNOSIS — Z98.49 CATARACT EXTRACTION STATUS, UNSPECIFIED EYE: Chronic | ICD-10-CM

## 2025-02-25 DIAGNOSIS — Z90.49 ACQUIRED ABSENCE OF OTHER SPECIFIED PARTS OF DIGESTIVE TRACT: Chronic | ICD-10-CM

## 2025-02-25 DIAGNOSIS — L03.115 CELLULITIS OF RIGHT LOWER LIMB: ICD-10-CM

## 2025-02-25 DIAGNOSIS — K95.09 OTHER COMPLICATIONS OF GASTRIC BAND PROCEDURE: Chronic | ICD-10-CM

## 2025-02-25 PROCEDURE — 99214 OFFICE O/P EST MOD 30 MIN: CPT | Mod: 24

## 2025-02-25 PROCEDURE — 99213 OFFICE O/P EST LOW 20 MIN: CPT

## 2025-03-11 ENCOUNTER — RESULT REVIEW (OUTPATIENT)
Age: 75
End: 2025-03-11

## 2025-03-11 ENCOUNTER — OUTPATIENT (OUTPATIENT)
Dept: OUTPATIENT SERVICES | Facility: HOSPITAL | Age: 75
LOS: 1 days | End: 2025-03-11
Payer: MEDICARE

## 2025-03-11 DIAGNOSIS — Z98.890 OTHER SPECIFIED POSTPROCEDURAL STATES: Chronic | ICD-10-CM

## 2025-03-11 DIAGNOSIS — I73.9 PERIPHERAL VASCULAR DISEASE, UNSPECIFIED: ICD-10-CM

## 2025-03-11 DIAGNOSIS — T81.31XA DISRUPTION OF EXTERNAL OPERATION (SURGICAL) WOUND, NOT ELSEWHERE CLASSIFIED, INITIAL ENCOUNTER: ICD-10-CM

## 2025-03-11 DIAGNOSIS — I50.9 HEART FAILURE, UNSPECIFIED: ICD-10-CM

## 2025-03-11 DIAGNOSIS — I11.0 HYPERTENSIVE HEART DISEASE WITH HEART FAILURE: ICD-10-CM

## 2025-03-11 DIAGNOSIS — M19.90 UNSPECIFIED OSTEOARTHRITIS, UNSPECIFIED SITE: ICD-10-CM

## 2025-03-11 DIAGNOSIS — Z98.49 CATARACT EXTRACTION STATUS, UNSPECIFIED EYE: Chronic | ICD-10-CM

## 2025-03-11 DIAGNOSIS — I49.9 CARDIAC ARRHYTHMIA, UNSPECIFIED: ICD-10-CM

## 2025-03-11 DIAGNOSIS — K95.09 OTHER COMPLICATIONS OF GASTRIC BAND PROCEDURE: Chronic | ICD-10-CM

## 2025-03-11 DIAGNOSIS — L03.115 CELLULITIS OF RIGHT LOWER LIMB: ICD-10-CM
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
## 2025-03-11 DIAGNOSIS — E11.9 TYPE 2 DIABETES MELLITUS WITHOUT COMPLICATIONS: ICD-10-CM

## 2025-03-11 DIAGNOSIS — Z90.49 ACQUIRED ABSENCE OF OTHER SPECIFIED PARTS OF DIGESTIVE TRACT: Chronic | ICD-10-CM
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
## 2025-03-11 PROCEDURE — 17250 CHEM CAUT OF GRANLTJ TISSUE: CPT

## 2025-03-11 PROCEDURE — 93923 UPR/LXTR ART STDY 3+ LVLS: CPT

## 2025-03-11 PROCEDURE — 93923 UPR/LXTR ART STDY 3+ LVLS: CPT | Mod: 26

## 2025-03-11 PROCEDURE — 99214 OFFICE O/P EST MOD 30 MIN: CPT | Mod: 24

## 2025-03-19 DIAGNOSIS — L89.616 PRESSURE-INDUCED DEEP TISSUE DAMAGE OF RIGHT HEEL: ICD-10-CM

## 2025-03-25 ENCOUNTER — OUTPATIENT (OUTPATIENT)
Dept: OUTPATIENT SERVICES | Facility: HOSPITAL | Age: 75
LOS: 1 days | End: 2025-03-25

## 2025-03-25 DIAGNOSIS — I73.9 PERIPHERAL VASCULAR DISEASE, UNSPECIFIED: ICD-10-CM

## 2025-03-25 DIAGNOSIS — T81.31XA DISRUPTION OF EXTERNAL OPERATION (SURGICAL) WOUND, NOT ELSEWHERE CLASSIFIED, INITIAL ENCOUNTER: ICD-10-CM
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
## 2025-03-25 DIAGNOSIS — E11.9 TYPE 2 DIABETES MELLITUS WITHOUT COMPLICATIONS: ICD-10-CM

## 2025-03-25 DIAGNOSIS — Z98.890 OTHER SPECIFIED POSTPROCEDURAL STATES: Chronic | ICD-10-CM

## 2025-03-25 DIAGNOSIS — Z98.49 CATARACT EXTRACTION STATUS, UNSPECIFIED EYE: Chronic | ICD-10-CM

## 2025-03-25 DIAGNOSIS — I50.9 HEART FAILURE, UNSPECIFIED: ICD-10-CM

## 2025-03-25 DIAGNOSIS — I11.0 HYPERTENSIVE HEART DISEASE WITH HEART FAILURE: ICD-10-CM

## 2025-03-25 DIAGNOSIS — Z90.49 ACQUIRED ABSENCE OF OTHER SPECIFIED PARTS OF DIGESTIVE TRACT: Chronic | ICD-10-CM

## 2025-03-25 DIAGNOSIS — L03.115 CELLULITIS OF RIGHT LOWER LIMB: ICD-10-CM

## 2025-03-25 DIAGNOSIS — K95.09 OTHER COMPLICATIONS OF GASTRIC BAND PROCEDURE: Chronic | ICD-10-CM

## 2025-03-25 DIAGNOSIS — I49.9 CARDIAC ARRHYTHMIA, UNSPECIFIED: ICD-10-CM

## 2025-03-25 DIAGNOSIS — M19.90 UNSPECIFIED OSTEOARTHRITIS, UNSPECIFIED SITE: ICD-10-CM
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
## 2025-03-25 PROCEDURE — 99212 OFFICE O/P EST SF 10 MIN: CPT

## 2025-04-18 NOTE — PATIENT PROFILE ADULT. - FUNCTIONAL SCREEN CURRENT LEVEL: TOILETING, MLM
Beulah Luis, was evaluated through a synchronous (real-time) audio-video encounter. The patient (or guardian if applicable) is aware that this is a billable service, which includes applicable co-pays. This Virtual Visit was conducted with patient's (and/or legal guardian's) consent. Patient identification was verified, and a caregiver was present when appropriate.   The patient was located at Home: [address] [facility] [id number]  Provider was located at Facility (Appt Dept): [address] [place] [address] [address]  [place] [address]  Confirm you are appropriately licensed, registered, or certified to deliver care in the state where the patient is located as indicated above. If you are not or unsure, please re-schedule the visit: Yes, I confirm.     Beulah Luis (:  1967) is a Established patient, presenting virtually for evaluation of the following:  Chief Complaint   Patient presents with    Follow-up     Malignant neoplasm of upper-inner quadrant of right breast in female, estrogen receptor negative   She had her port removed and is scheduled to see radiation at [facility] on 25.  1. Have you been to the ER, urgent care clinic since your last visit?  Hospitalized since your last visit?No    2. Have you seen or consulted any other health care providers outside of the [facility] since your last visit?  Include any pap smears or colon screening. No        Patient-Reported Vitals  Patient-Reported Systolic (Top): 154 mmHg  Patient-Reported Diastolic (Bottom): 87 mmHg  Patient-Reported Pulse: 100  Patient-Reported Temperature: 98.0  Patient-Reported Weight: 183lb  Patient-Reported Height: 5'7  Patient-Reported Pulse Oximetry: 100           --Kailyn Mahan MA          Treatment curative  Prognosis: Good       Neoadjuvant chemotherapy.    TCH + P    Taxotere 75 mg/2 IV q3w  Carboplatin AUC 6 IV q3w  Trastuzumab 8 mg/kg followed by 6 mg/kg IV q3w  Pertuzumab 840 mg followed by 420 mg IV q3w    6 Cycles  Phes[place]     Due to residual disease in the pathological specimen, I recommend she receive T-DM1. She declined enrollment in ENCOMPASS Her 2 adjuvant trial.     Adjuvant treatment  T-DM-1, cycle 1 day 1      The duration of this treatment plan will be 1 yr, intolerable side effects, or patient choice. Patient will be meeting with navigation services to discuss any financial barriers to care/estimated cost of care. The patient's emotional well being was addressed during this office visit and patient seems to be coping well with the diagnosis and the treatment.     Common Side Effects of Chemotherapy  Decreased Blood Counts Your blood counts can decrease temporarily due to chemotherapy, they will recover over time.   This is an expected side effect that your Doctor will be monitoring.  If you experience fevers (temperature >100.4°F), bleeding or unexplained bruising, please call the office right away   Risk of Infection Your white blood cells can decrease temporarily due to chemotherapy and can put you at higher risk of infection.  Washing hands frequently with soap and avoiding sick contacts can reduce your risk of infection.  If you experience fevers (temperature >100.4°F), shaking chills, or any signs of infection, please call the office immediately   Anemia Chemotherapy can cause your red blood cells to temporarily decrease; this is an expected side effect that your Doctor will be monitoring.  You may experience fatigue if this occurs, please notify the office if you experience bleeding, shortness of breath with minimal exertion or at rest, rapid heartbeat, or feeling as though you may lose consciousness.   Hair Loss Chemotherapy can affect your hair follicles and cause you to lose  (0) independent

## 2025-05-14 ENCOUNTER — APPOINTMENT (OUTPATIENT)
Dept: ELECTROPHYSIOLOGY | Facility: CLINIC | Age: 75
End: 2025-05-14

## 2025-05-14 ENCOUNTER — APPOINTMENT (OUTPATIENT)
Dept: VASCULAR SURGERY | Facility: CLINIC | Age: 75
End: 2025-05-14

## 2025-05-14 ENCOUNTER — NON-APPOINTMENT (OUTPATIENT)
Age: 75
End: 2025-05-14

## 2025-05-14 VITALS
BODY MASS INDEX: 35.55 KG/M2 | SYSTOLIC BLOOD PRESSURE: 113 MMHG | WEIGHT: 240 LBS | HEIGHT: 69 IN | DIASTOLIC BLOOD PRESSURE: 63 MMHG | OXYGEN SATURATION: 95 % | HEART RATE: 98 BPM

## 2025-05-14 VITALS
SYSTOLIC BLOOD PRESSURE: 142 MMHG | BODY MASS INDEX: 35.55 KG/M2 | HEIGHT: 69 IN | HEART RATE: 99 BPM | OXYGEN SATURATION: 100 % | DIASTOLIC BLOOD PRESSURE: 82 MMHG | WEIGHT: 240 LBS

## 2025-05-14 DIAGNOSIS — R00.1 BRADYCARDIA, UNSPECIFIED: ICD-10-CM

## 2025-05-14 PROCEDURE — 93000 ELECTROCARDIOGRAM COMPLETE: CPT

## 2025-05-14 PROCEDURE — 99215 OFFICE O/P EST HI 40 MIN: CPT

## 2025-05-14 PROCEDURE — G2211 COMPLEX E/M VISIT ADD ON: CPT

## 2025-05-14 PROCEDURE — 99214 OFFICE O/P EST MOD 30 MIN: CPT

## 2025-05-14 NOTE — DIETITIAN INITIAL EVALUATION ADULT - OTHER INFO
What Type Of Note Output Would You Prefer (Optional)?: Bullet Format Hpi Title: Evaluation of Skin Lesions How Severe Are Your Spot(S)?: moderate Have Your Spot(S) Been Treated In The Past?: has not been treated Additional History: Scaly areas on face 71 y/o female pmhx Afib on coumadin, HFpEF, DM2, peripheral neuropathy, PVD, HTN, HLD, obesity presented to ER tonight w/ fevers and right lower extremity pain. Patient called her vascular surgeons ( Dr. Yoo)  office today, who instructed her to come to the ER. Today she admits to increased fatigue and fevers. Seen by vascular surgery in the ER, ordered imaging of RLE. Post IVF patient remained hypotensive, started on phenylephrine. Admitted to ICU.     Pt appears large, overwt/ obese with no signs of muscle/ fat wasting. Pt states she did not eat anything yesterday, today ate <50% breakfast meal observed by RD. Reports poor appetite. Has hx of intentional wt loss - reports #. Admit wt 264#. Pt denies oral nutrition supplement for now, but if continues w/ poor PO will trial. See other recommendations below.

## 2025-05-15 RX ORDER — AMIODARONE HYDROCHLORIDE 100 MG/1
100 TABLET ORAL DAILY
Refills: 0 | Status: ACTIVE | COMMUNITY

## 2025-05-15 RX ORDER — POTASSIUM CHLORIDE 1500 MG/1
20 TABLET, FILM COATED, EXTENDED RELEASE ORAL
Refills: 0 | Status: ACTIVE | COMMUNITY

## 2025-05-15 RX ORDER — RIVAROXABAN 20 MG/1
20 TABLET, FILM COATED ORAL DAILY
Refills: 0 | Status: ACTIVE | COMMUNITY

## 2025-05-15 NOTE — PATIENT PROFILE ADULT - NSPROMEDSBROUGHTTOHOSP_GEN_A_NUR
Cardiology   Dima Armendariz 67 y.o. male MRN: [id number]   Encounter: [id number]        Reason for Consult / Principal Problem: Presyncope    Provider requesting Consult: SRUTHI Dubose    PCP: SRUTHI Dubose      Assessment & Plan  Dyslipidemia    Aortic root dilation (HCC)    Dizziness    Insomnia, unspecified type    Snoring    Mild aortic regurgitation           Plan:    -Check echocardiogram in 1 year to evaluate aorta size and evaluate degree of aortic regurgitation  -Check coronary calcium score and lipid panel prior to next visit  - Refer to sleep medicine for insomnia and snoring  -Counseled on adequate hydration including electrolytes  -Recommend Jobst stockings especially while exercising  -Return to office in 6 months or sooner if indicated      5/15/2025: No symptoms, lightheadedness is improved.  He denies any chest pain or shortness of breath.  He has not been watching his diet but has maintained the weight that he previously lost    CC: Lightheadedness, dyspnea on exertion    HPI  67 y.o. male presents with the above symptoms.  He has had episodes of presyncope after exercise.  He has never completely passed out.  He does have dyspnea with exercise.  He exercises regularly.  The symptoms are fairly frequent.  He sometimes has lightheadedness while standing from a recumbent position as well.  Denies any ongoing palpitations or chest pain.      The patient denies chest pain, palpitations orthopnea, PND, pedal edema, syncope,  diaphoresis, nausea/vomiting       The 10-year ASCVD risk score (Cora CRANE, et al., 2019) is: 12.6%    Values used to calculate the score:      Age: 67 years      Clincally relevant sex: Male      Is Non- : No      Diabetic: No      Tobacco smoker: No      Systolic Blood Pressure: 116 mmHg      Is BP treated: No      HDL Cholesterol: 39 mg/dL      Total Cholesterol: 145 mg/dL            Physical exam  Objective   Vitals: Blood pressure 116/58,  "pulse 70, height 6' 1\" (1.854 m), weight 89.6 kg (197 lb 9.6 oz), SpO2 98%.    General:  AO x3, no acute distress  Cardiac:  S1-S2 normal,  No murmurs. No rubs or gallops, JVP: normal  Lungs:  Clear to auscultation bilaterally, no wheezing or crackles.  Abdomen:  Soft, nontender, nondistended.  Extremities:  Warm, well perfused, pulses palpable, no ulcers or rashes. Edema:absent  Neuro: Grossly nonfocal        ======================================================  TREADMILL STRESS  No results found for this or any previous visit.     ----------------------------------------------------------------------------------------------  NUCLEAR STRESS TEST: No results found for this or any previous visit.    No results found for this or any previous visit.      --------------------------------------------------------------------------------  CATH:  No results found for this or any previous visit.    --------------------------------------------------------------------------------  ECHO:   No results found for this or any previous visit.    No results found for this or any previous visit.    --------------------------------------------------------------------------------  HOLTER  No results found for this or any previous visit.    --------------------------------------------------------------------------------  CAROTIDS  Results for orders placed during the hospital encounter of 07/24/19    VAS carotid complete study    Narrative  [facility] REPORT  CLINICAL:  Indications:  Yearly surveillance of carotid artery disease.  Patient is asymptomatic at this  time.  Operative History:  No Cardiovascular Surgeries  Risk Factors  The patient has history of HLD and previous smoking (quit >10yrs ago).    FINDINGS:    Right        Impression  PSV  EDV (cm/s)  Direction of Flow  Ratio  Dist. ICA                 54          18                      0.63  Mid. ICA                  64          23                      0.73  Prox. ICA "    Normal       49          10                      0.56  Dist CCA                  81          21  Mid CCA                   87          17                      0.88  Prox CCA                  99          19                      0.79  Ext Carotid               81          11                      0.94  Prox Vert                 36           9  Antegrade  Subclavian               136           0  Innominate               125          11    Left         Impression  PSV  EDV (cm/s)  Direction of Flow  Ratio  Dist. ICA                 47          18                      0.51  Mid. ICA                  42          14                      0.45  Prox. ICA    Normal       46          11                      0.51  Dist CCA                  75          19  Mid CCA                   91          20                      0.82  Prox CCA                 112          19  Ext Carotid               70          11                      0.76  Prox Vert                 33          11  Antegrade  Subclavian               135           0        CONCLUSION:    Impression  RIGHT:  There is no evidence of disease throughout the extracranial carotid arteries.  Vertebral artery flow is antegrade.  There is no significant subclavian artery disease.    LEFT:  There is no evidence of disease throughout the extracranial carotid arteries.  Vertebral artery flow is antegrade.  There is no significant subclavian artery disease.    Internal carotid artery stenosis determination by consensus criteria from:  SANTOSH Giordano, et.al. Carotid Artery Stenosis: Gray-Scale and Doppler US Diagnosis  - Society of Radiologists in Ultrasound Consensus Conference, Radiology 2003;  229:[id number].    SIGNATURE:  Electronically Signed by: HÉCTOR GUERRA MD on 2019-07-24 05:31:57 PM       Diagnoses and all orders for this visit:    Dyslipidemia  -     Lipid Panel With Direct LDL; Future       ======================================================          Review of  "Systems  ROS as noted above, otherwise 12 point review of systems was performed and is negative.     Historical Information   Past Medical History:   Diagnosis Date    Abdominal pain     Anxiety     BPH with obstruction/lower urinary tract symptoms     BPH with obstruction/lower urinary tract symptoms     Chronic diarrhea     Colon polyp     Disease of thyroid gland     Hypogonadism in male     Lightheadedness     last assessed-2015    Nocturia     Nocturia     Partial small bowel obstruction (HCC)     last assessed-2016    Shoulder injury, left, initial encounter     resolved:2017    Sixth cranial nerve palsy     last assessed-2015    Sleep apnea     Testicular pain, left 2020    Weight loss      Past Surgical History:   Procedure Laterality Date    COLONOSCOPY      NASAL SINUS SURGERY Bilateral     TONSILLECTOMY       Social History     Substance and Sexual Activity   Alcohol Use Yes    Alcohol/week: 1.0 standard drink of alcohol    Types: 1 Cans of beer per week    Comment: social     Social History     Substance and Sexual Activity   Drug Use No     Social History     Tobacco Use   Smoking Status Former    Current packs/day: 0.00    Types: Cigarettes    Quit date: 1988    Years since quittin.9   Smokeless Tobacco Former    Types: Chew     Family History   Problem Relation Age of Onset    Cancer Other     Diabetes Family     Other Other         back disorder    Heart disease Brother        Meds/Allergies   Hospital Medications:   Current Facility-Administered Medications:     cyanocobalamin injection 1,000 mcg, Q30 Days  Home Medications: Not in a hospital admission.    Allergies   Allergen Reactions    Codeine GI Intolerance    Pine Allergic Rhinitis    Pollen Extract Allergic Rhinitis         Portions of the record may have been created with voice recognition software.  Occasional wrong words or \"sound a like\" substitutions may have occurred due to the inherent limitations of " voice recognition software.  Read the chart carefully and recognize, using context, where substitutions have occurred.        I have spent a total of 35 min in reviewing and/or ordering tests, medications, or procedures, performing an examination or evaluation, reviewing pertinent history, counseling and educating the patient, referring and/or communicating with other health care professionals, documenting in the EMR and general coordination of care of the patient today.               no

## 2025-05-27 ENCOUNTER — OUTPATIENT (OUTPATIENT)
Dept: OUTPATIENT SERVICES | Facility: HOSPITAL | Age: 75
LOS: 1 days | End: 2025-05-27
Payer: MEDICARE

## 2025-05-27 VITALS
HEART RATE: 49 BPM | DIASTOLIC BLOOD PRESSURE: 52 MMHG | RESPIRATION RATE: 18 BRPM | SYSTOLIC BLOOD PRESSURE: 123 MMHG | WEIGHT: 233.03 LBS | TEMPERATURE: 98 F | HEIGHT: 69 IN | OXYGEN SATURATION: 99 %

## 2025-05-27 DIAGNOSIS — Z98.890 OTHER SPECIFIED POSTPROCEDURAL STATES: Chronic | ICD-10-CM

## 2025-05-27 DIAGNOSIS — R00.1 BRADYCARDIA, UNSPECIFIED: ICD-10-CM

## 2025-05-27 DIAGNOSIS — Z95.828 PRESENCE OF OTHER VASCULAR IMPLANTS AND GRAFTS: Chronic | ICD-10-CM

## 2025-05-27 DIAGNOSIS — K95.09 OTHER COMPLICATIONS OF GASTRIC BAND PROCEDURE: Chronic | ICD-10-CM

## 2025-05-27 DIAGNOSIS — Z98.49 CATARACT EXTRACTION STATUS, UNSPECIFIED EYE: Chronic | ICD-10-CM

## 2025-05-27 DIAGNOSIS — Z95.2 PRESENCE OF PROSTHETIC HEART VALVE: Chronic | ICD-10-CM

## 2025-05-27 DIAGNOSIS — Z90.49 ACQUIRED ABSENCE OF OTHER SPECIFIED PARTS OF DIGESTIVE TRACT: Chronic | ICD-10-CM

## 2025-05-27 DIAGNOSIS — Z01.818 ENCOUNTER FOR OTHER PREPROCEDURAL EXAMINATION: ICD-10-CM

## 2025-05-27 LAB
ANION GAP SERPL CALC-SCNC: 3 MMOL/L — LOW (ref 5–17)
BASOPHILS # BLD AUTO: 0.06 K/UL — SIGNIFICANT CHANGE UP (ref 0–0.2)
BASOPHILS NFR BLD AUTO: 1.1 % — SIGNIFICANT CHANGE UP (ref 0–2)
BLD GP AB SCN SERPL QL: SIGNIFICANT CHANGE UP
BUN SERPL-MCNC: 27 MG/DL — HIGH (ref 7–23)
CALCIUM SERPL-MCNC: 9.2 MG/DL — SIGNIFICANT CHANGE UP (ref 8.5–10.1)
CHLORIDE SERPL-SCNC: 109 MMOL/L — HIGH (ref 96–108)
CO2 SERPL-SCNC: 28 MMOL/L — SIGNIFICANT CHANGE UP (ref 22–31)
CREAT SERPL-MCNC: 0.77 MG/DL — SIGNIFICANT CHANGE UP (ref 0.5–1.3)
EGFR: 80 ML/MIN/1.73M2 — SIGNIFICANT CHANGE UP
EGFR: 80 ML/MIN/1.73M2 — SIGNIFICANT CHANGE UP
EOSINOPHIL # BLD AUTO: 0.21 K/UL — SIGNIFICANT CHANGE UP (ref 0–0.5)
EOSINOPHIL NFR BLD AUTO: 3.7 % — SIGNIFICANT CHANGE UP (ref 0–6)
GLUCOSE SERPL-MCNC: 115 MG/DL — HIGH (ref 70–99)
HCT VFR BLD CALC: 34.3 % — LOW (ref 34.5–45)
HGB BLD-MCNC: 10.2 G/DL — LOW (ref 11.5–15.5)
IMM GRANULOCYTES # BLD AUTO: 0.02 K/UL — SIGNIFICANT CHANGE UP (ref 0–0.07)
IMM GRANULOCYTES NFR BLD AUTO: 0.4 % — SIGNIFICANT CHANGE UP (ref 0–0.9)
LYMPHOCYTES # BLD AUTO: 0.95 K/UL — LOW (ref 1–3.3)
LYMPHOCYTES NFR BLD AUTO: 16.8 % — SIGNIFICANT CHANGE UP (ref 13–44)
MCHC RBC-ENTMCNC: 28.3 PG — SIGNIFICANT CHANGE UP (ref 27–34)
MCHC RBC-ENTMCNC: 29.7 G/DL — LOW (ref 32–36)
MCV RBC AUTO: 95 FL — SIGNIFICANT CHANGE UP (ref 80–100)
MONOCYTES # BLD AUTO: 0.51 K/UL — SIGNIFICANT CHANGE UP (ref 0–0.9)
MONOCYTES NFR BLD AUTO: 9 % — SIGNIFICANT CHANGE UP (ref 2–14)
MRSA PCR RESULT.: DETECTED
NEUTROPHILS # BLD AUTO: 3.9 K/UL — SIGNIFICANT CHANGE UP (ref 1.8–7.4)
NEUTROPHILS NFR BLD AUTO: 69 % — SIGNIFICANT CHANGE UP (ref 43–77)
NRBC # BLD AUTO: 0 K/UL — SIGNIFICANT CHANGE UP (ref 0–0)
NRBC # FLD: 0 K/UL — SIGNIFICANT CHANGE UP (ref 0–0)
NRBC BLD AUTO-RTO: 0 /100 WBCS — SIGNIFICANT CHANGE UP (ref 0–0)
PLATELET # BLD AUTO: 242 K/UL — SIGNIFICANT CHANGE UP (ref 150–400)
PMV BLD: 10 FL — SIGNIFICANT CHANGE UP (ref 7–13)
POTASSIUM SERPL-MCNC: 4.3 MMOL/L — SIGNIFICANT CHANGE UP (ref 3.5–5.3)
POTASSIUM SERPL-SCNC: 4.3 MMOL/L — SIGNIFICANT CHANGE UP (ref 3.5–5.3)
RBC # BLD: 3.61 M/UL — LOW (ref 3.8–5.2)
RBC # FLD: 14.6 % — HIGH (ref 10.3–14.5)
S AUREUS DNA NOSE QL NAA+PROBE: DETECTED
SODIUM SERPL-SCNC: 140 MMOL/L — SIGNIFICANT CHANGE UP (ref 135–145)
WBC # BLD: 5.65 K/UL — SIGNIFICANT CHANGE UP (ref 3.8–10.5)
WBC # FLD AUTO: 5.65 K/UL — SIGNIFICANT CHANGE UP (ref 3.8–10.5)

## 2025-05-27 PROCEDURE — 71046 X-RAY EXAM CHEST 2 VIEWS: CPT

## 2025-05-27 PROCEDURE — 93005 ELECTROCARDIOGRAM TRACING: CPT

## 2025-05-27 PROCEDURE — 71046 X-RAY EXAM CHEST 2 VIEWS: CPT | Mod: 26

## 2025-05-27 PROCEDURE — 80048 BASIC METABOLIC PNL TOTAL CA: CPT

## 2025-05-27 PROCEDURE — 99214 OFFICE O/P EST MOD 30 MIN: CPT | Mod: 25

## 2025-05-27 PROCEDURE — 87641 MR-STAPH DNA AMP PROBE: CPT

## 2025-05-27 PROCEDURE — 86850 RBC ANTIBODY SCREEN: CPT

## 2025-05-27 PROCEDURE — 36415 COLL VENOUS BLD VENIPUNCTURE: CPT

## 2025-05-27 PROCEDURE — 93010 ELECTROCARDIOGRAM REPORT: CPT

## 2025-05-27 PROCEDURE — 85025 COMPLETE CBC W/AUTO DIFF WBC: CPT

## 2025-05-27 PROCEDURE — 86900 BLOOD TYPING SEROLOGIC ABO: CPT

## 2025-05-27 PROCEDURE — 86901 BLOOD TYPING SEROLOGIC RH(D): CPT

## 2025-05-27 PROCEDURE — 87640 STAPH A DNA AMP PROBE: CPT

## 2025-05-27 NOTE — H&P PST ADULT - ASSESSMENT
74 year old female, former smoker, with PMH of aortic stenosis, S/P TAVR, PAD, atrial fibrillation, HTN, hyperlipidemia, DM type 2/off medication, chronic PVD/prior vascular ulcers and morbid obesity/prior lap band (since removed). She is scheduled for PPM placement.   Plan  1. Stop all NSAIDS, herbal supplements and vitamins for 7 days.  2. NPO at midnight  3. Pre procedure medication instructions as per EP MD/NP  4. Use EZ sponges as directed  5. Use mupirocin as directed  6. Labs, EKG, CXR as per EP MD/NP orders.            74 year old female with bradycardia, PMH of aortic stenosis, S/P TAVR, PAD, atrial fibrillation, HTN, hyperlipidemia, DM type 2/off medication, chronic PVD/prior vascular ulcers and morbid obesity/prior lap band (since removed). She is scheduled for PPM placement.   Plan  1. Stop all NSAIDS, herbal supplements and vitamins for 7 days.  2. NPO at midnight  3. Pre procedure medication instructions as per EP MD/NP  4. Use EZ sponges as directed  5. Use mupirocin as directed  6. Labs, EKG, CXR as per EP MD/NP orders.

## 2025-05-27 NOTE — H&P PST ADULT - NSICDXPASTMEDICALHX_GEN_ALL_CORE_FT
PAST MEDICAL HISTORY:  Anemia     At risk for sleep apnea     Atrial fibrillation     Bradycardia     Cellulitis     Congestive heart failure     Diabetes mellitus type II, controlled     Dyspnea     History of heart valve disorder     HTN (hypertension)     Morbid obesity with BMI of 40.0-44.9, adult     Neuropathy     Peripheral venous insufficiency     Thyroid nodule      PAST MEDICAL HISTORY:  Anemia     At risk for sleep apnea     Atrial fibrillation     Bradycardia     CAD (coronary artery disease)     Cellulitis     Congestive heart failure     Diabetes mellitus type II, controlled     Dyspnea     History of heart valve disorder     HTN (hypertension)     Morbid obesity with BMI of 40.0-44.9, adult     Neuropathy     Peripheral venous insufficiency     Thyroid nodule

## 2025-05-27 NOTE — H&P PST ADULT - HISTORY OF PRESENT ILLNESS
74 year old female, former smoker, with PMH of aortic stenosis, S/P TAVR, PAD, atrial fibrillation, HTN, hyperlipidemia, DM type 2/off medication, chronic PVD/prior vascular ulcers and morbid obesity/prior lap band (since removed). Pt reports SOB, she went to her cardiologist and was found to have bradycardia. She is here for PST for planned PPM placement.  74 year old female with Bradycardia, her PMH includes aortic stenosis, S/P TAVR, PAD, atrial fibrillation, HTN, hyperlipidemia, DM type 2/off medication, chronic PVD/prior vascular ulcers and morbid obesity/prior lap band (since removed). Pt reports SOB, she went to her cardiologist and was found to have bradycardia. She is here for PST for planned PPM placement.

## 2025-05-27 NOTE — H&P PST ADULT - NSICDXPASTSURGICALHX_GEN_ALL_CORE_FT
PAST SURGICAL HISTORY:  H/O cardiac radiofrequency ablation     H/O colonoscopy     History of cholecystectomy     History of esophagogastroduodenoscopy (EGD)     Other complications of gastric band procedure     S/P cataract surgery     S/P femoropopliteal bypass surgery     S/P left knee arthroscopy     S/P TAVR (transcatheter aortic valve replacement)     Status post medial meniscus repair      PAST SURGICAL HISTORY:  H/O cardiac radiofrequency ablation     H/O colonoscopy     History of cholecystectomy     History of esophagogastroduodenoscopy (EGD)     History of percutaneous angioplasty     Other complications of gastric band procedure     S/P cataract surgery     S/P femoropopliteal bypass surgery     S/P left knee arthroscopy     S/P TAVR (transcatheter aortic valve replacement)     Status post medial meniscus repair

## 2025-05-28 DIAGNOSIS — R00.1 BRADYCARDIA, UNSPECIFIED: ICD-10-CM

## 2025-05-28 DIAGNOSIS — Z01.818 ENCOUNTER FOR OTHER PREPROCEDURAL EXAMINATION: ICD-10-CM

## 2025-05-28 PROBLEM — D64.9 ANEMIA, UNSPECIFIED: Chronic | Status: ACTIVE | Noted: 2025-05-27

## 2025-05-28 PROBLEM — I25.10 ATHEROSCLEROTIC HEART DISEASE OF NATIVE CORONARY ARTERY WITHOUT ANGINA PECTORIS: Chronic | Status: ACTIVE | Noted: 2025-05-27

## 2025-05-28 NOTE — CHART NOTE - NSCHARTNOTEFT_GEN_A_CORE
Patient positive for MRSA/MSSA; Instructed pt to start mupirocin on 5/30-6/3 2025 . Pt instructed to read card with instructions for mupirocin use; Patient positive for MRSA/MSSA; Instructed pt to start mupirocin on 5/30-6/3 2025 . Pt instructed to read card with instructions for mupirocin use; JEFF rutherford

## 2025-05-29 ENCOUNTER — APPOINTMENT (OUTPATIENT)
Age: 75
End: 2025-05-29
Payer: MEDICARE

## 2025-05-29 DIAGNOSIS — B35.1 TINEA UNGUIUM: ICD-10-CM

## 2025-05-29 PROBLEM — R00.1 BRADYCARDIA, UNSPECIFIED: Chronic | Status: ACTIVE | Noted: 2025-05-27

## 2025-05-29 PROBLEM — Z86.79 PERSONAL HISTORY OF OTHER DISEASES OF THE CIRCULATORY SYSTEM: Chronic | Status: ACTIVE | Noted: 2025-05-27

## 2025-05-29 PROCEDURE — 11721 DEBRIDE NAIL 6 OR MORE: CPT | Mod: Q8

## 2025-05-29 PROCEDURE — 99213 OFFICE O/P EST LOW 20 MIN: CPT | Mod: 25

## 2025-06-06 NOTE — ASU PATIENT PROFILE, ADULT - FALL HARM RISK - HARM RISK INTERVENTIONS

## 2025-06-09 ENCOUNTER — OUTPATIENT (OUTPATIENT)
Dept: OUTPATIENT SERVICES | Facility: HOSPITAL | Age: 75
LOS: 1 days | Discharge: HOME | End: 2025-06-09
Payer: MEDICARE

## 2025-06-09 ENCOUNTER — RESULT REVIEW (OUTPATIENT)
Age: 75
End: 2025-06-09

## 2025-06-09 VITALS
HEIGHT: 69 IN | RESPIRATION RATE: 14 BRPM | WEIGHT: 240.08 LBS | DIASTOLIC BLOOD PRESSURE: 67 MMHG | TEMPERATURE: 99 F | SYSTOLIC BLOOD PRESSURE: 183 MMHG | HEART RATE: 55 BPM | OXYGEN SATURATION: 98 %

## 2025-06-09 DIAGNOSIS — Z95.828 PRESENCE OF OTHER VASCULAR IMPLANTS AND GRAFTS: Chronic | ICD-10-CM

## 2025-06-09 DIAGNOSIS — Z90.49 ACQUIRED ABSENCE OF OTHER SPECIFIED PARTS OF DIGESTIVE TRACT: Chronic | ICD-10-CM

## 2025-06-09 DIAGNOSIS — K95.09 OTHER COMPLICATIONS OF GASTRIC BAND PROCEDURE: Chronic | ICD-10-CM

## 2025-06-09 DIAGNOSIS — Z98.890 OTHER SPECIFIED POSTPROCEDURAL STATES: Chronic | ICD-10-CM

## 2025-06-09 DIAGNOSIS — Z95.2 PRESENCE OF PROSTHETIC HEART VALVE: Chronic | ICD-10-CM

## 2025-06-09 DIAGNOSIS — Z98.890 OTHER SPECIFIED POSTPROCEDURAL STATES: ICD-10-CM

## 2025-06-09 DIAGNOSIS — R00.1 BRADYCARDIA, UNSPECIFIED: ICD-10-CM

## 2025-06-09 DIAGNOSIS — Z98.49 CATARACT EXTRACTION STATUS, UNSPECIFIED EYE: Chronic | ICD-10-CM

## 2025-06-09 LAB — BLD GP AB SCN SERPL QL: SIGNIFICANT CHANGE UP

## 2025-06-09 PROCEDURE — 33208 INSRT HEART PM ATRIAL & VENT: CPT

## 2025-06-09 PROCEDURE — 86901 BLOOD TYPING SEROLOGIC RH(D): CPT

## 2025-06-09 PROCEDURE — 71045 X-RAY EXAM CHEST 1 VIEW: CPT | Mod: 26

## 2025-06-09 PROCEDURE — C1887: CPT

## 2025-06-09 PROCEDURE — 93010 ELECTROCARDIOGRAM REPORT: CPT

## 2025-06-09 PROCEDURE — 36415 COLL VENOUS BLD VENIPUNCTURE: CPT

## 2025-06-09 PROCEDURE — 71045 X-RAY EXAM CHEST 1 VIEW: CPT

## 2025-06-09 PROCEDURE — 93005 ELECTROCARDIOGRAM TRACING: CPT

## 2025-06-09 PROCEDURE — C1898: CPT

## 2025-06-09 PROCEDURE — 85025 COMPLETE CBC W/AUTO DIFF WBC: CPT

## 2025-06-09 PROCEDURE — 86850 RBC ANTIBODY SCREEN: CPT

## 2025-06-09 PROCEDURE — 86900 BLOOD TYPING SEROLOGIC ABO: CPT

## 2025-06-09 PROCEDURE — C1769: CPT

## 2025-06-09 PROCEDURE — 80048 BASIC METABOLIC PNL TOTAL CA: CPT

## 2025-06-09 PROCEDURE — 33208 INSRT HEART PM ATRIAL & VENT: CPT | Mod: KX

## 2025-06-09 PROCEDURE — C1785: CPT

## 2025-06-09 PROCEDURE — C1894: CPT

## 2025-06-09 RX ORDER — SPIRONOLACTONE 25 MG
25 TABLET ORAL DAILY
Refills: 0 | Status: DISCONTINUED | OUTPATIENT
Start: 2025-06-09 | End: 2025-06-10

## 2025-06-09 RX ORDER — EZETIMIBE 10 MG/1
10 TABLET ORAL DAILY
Refills: 0 | Status: DISCONTINUED | OUTPATIENT
Start: 2025-06-09 | End: 2025-06-10

## 2025-06-09 RX ORDER — AMIODARONE HYDROCHLORIDE 50 MG/ML
100 INJECTION, SOLUTION INTRAVENOUS DAILY
Refills: 0 | Status: DISCONTINUED | OUTPATIENT
Start: 2025-06-09 | End: 2025-06-10

## 2025-06-09 RX ORDER — MONTELUKAST SODIUM 10 MG/1
10 TABLET ORAL AT BEDTIME
Refills: 0 | Status: DISCONTINUED | OUTPATIENT
Start: 2025-06-09 | End: 2025-06-10

## 2025-06-09 RX ORDER — OXYCODONE HYDROCHLORIDE 30 MG/1
10 TABLET ORAL ONCE
Refills: 0 | Status: DISCONTINUED | OUTPATIENT
Start: 2025-06-09 | End: 2025-06-09

## 2025-06-09 RX ORDER — CEFAZOLIN SODIUM IN 0.9 % NACL 3 G/100 ML
1000 INTRAVENOUS SOLUTION, PIGGYBACK (ML) INTRAVENOUS EVERY 8 HOURS
Refills: 0 | Status: COMPLETED | OUTPATIENT
Start: 2025-06-09 | End: 2025-06-10

## 2025-06-09 RX ORDER — CLOPIDOGREL BISULFATE 75 MG/1
1 TABLET, FILM COATED ORAL
Refills: 0 | DISCHARGE

## 2025-06-09 RX ORDER — GABAPENTIN 400 MG/1
800 CAPSULE ORAL THREE TIMES A DAY
Refills: 0 | Status: DISCONTINUED | OUTPATIENT
Start: 2025-06-09 | End: 2025-06-10

## 2025-06-09 RX ORDER — ATORVASTATIN CALCIUM 80 MG/1
10 TABLET, FILM COATED ORAL AT BEDTIME
Refills: 0 | Status: DISCONTINUED | OUTPATIENT
Start: 2025-06-09 | End: 2025-06-10

## 2025-06-09 RX ORDER — ACETAMINOPHEN 500 MG/5ML
1000 LIQUID (ML) ORAL ONCE
Refills: 0 | Status: COMPLETED | OUTPATIENT
Start: 2025-06-09 | End: 2025-06-09

## 2025-06-09 RX ORDER — SPIRONOLACTONE 25 MG
1 TABLET ORAL
Refills: 0 | DISCHARGE

## 2025-06-09 RX ORDER — AMIODARONE HYDROCHLORIDE 50 MG/ML
1 INJECTION, SOLUTION INTRAVENOUS
Refills: 0 | DISCHARGE

## 2025-06-09 RX ORDER — RIVAROXABAN 10 MG/1
20 TABLET, FILM COATED ORAL
Refills: 0 | Status: DISCONTINUED | OUTPATIENT
Start: 2025-06-10 | End: 2025-06-10

## 2025-06-09 RX ADMIN — MONTELUKAST SODIUM 10 MILLIGRAM(S): 10 TABLET ORAL at 21:29

## 2025-06-09 RX ADMIN — Medication 1000 MILLIGRAM(S): at 21:38

## 2025-06-09 RX ADMIN — OXYCODONE HYDROCHLORIDE 10 MILLIGRAM(S): 30 TABLET ORAL at 22:35

## 2025-06-09 RX ADMIN — GABAPENTIN 800 MILLIGRAM(S): 400 CAPSULE ORAL at 21:29

## 2025-06-09 RX ADMIN — Medication 5 MILLIGRAM(S): at 22:35

## 2025-06-09 RX ADMIN — Medication 400 MILLIGRAM(S): at 14:19

## 2025-06-09 RX ADMIN — Medication 40 MILLIGRAM(S): at 21:29

## 2025-06-09 RX ADMIN — Medication 1000 MILLIGRAM(S): at 15:53

## 2025-06-09 RX ADMIN — OXYCODONE HYDROCHLORIDE 10 MILLIGRAM(S): 30 TABLET ORAL at 11:05

## 2025-06-09 RX ADMIN — ATORVASTATIN CALCIUM 10 MILLIGRAM(S): 80 TABLET, FILM COATED ORAL at 21:29

## 2025-06-09 NOTE — PATIENT PROFILE ADULT - FUNCTIONAL ASSESSMENT - BASIC MOBILITY 6.
2-calculated by average/Not able to assess (calculate score using [facility] averaging method)

## 2025-06-09 NOTE — PATIENT PROFILE ADULT - PATIENT'S PREFERRED PRONOUN
Please schedule a 1 week ziopatch.     Please decrease carvedilol to 3.125mg twice a day.     Please increase norvasc (also called amlodipine) to 5mg once a day.     Please take hydralazine 10mg up to three times a day as needed for a systolic blood pressure >160 (the top number).     Please let me know if his blood pressure is frequently <110 or >140.   
Her/She

## 2025-06-09 NOTE — PACU DISCHARGE NOTE - NAUSEA/VOMITING:
Spoke with patient to remind of 9/26 appointment. Provided New Patient instructions, patient expressed understanding and confirmed appointment.
None

## 2025-06-09 NOTE — PACU DISCHARGE NOTE - COMMENTS
patient with PPM placed by Dr. Melendez. Site c/d/i, wound class level 1, no signs or symptoms of bleeding or hematoma, left chest wall pressure dressing in place and intact. Patient in no acute distress. VSS, ROS as charted, see flowsheet for details. Report given to [facility] VLADIMIR serra. Patient given post procedure instructions by RN, verbalized understanding, no further questions. Patient sent to [facility] via stretcher, on zole, with RN and transport tech escort, safety maintained at all times.

## 2025-06-09 NOTE — PATIENT PROFILE ADULT - FALL HARM RISK - HARM RISK INTERVENTIONS
Assistance with ambulation/Assistance OOB with selected safe patient handling equipment/Communicate Risk of Fall with Harm to all staff/Discuss with provider need for PT consult/Monitor gait and stability/Provide patient with walking aids - walker, cane, crutches/Reinforce activity limits and safety measures with patient and family/Sit up slowly, dangle for a short time, stand at bedside before walking/Tailored Fall Risk Interventions/Use of alarms - bed, chair and/or voice tab/Visual Cue: Yellow wristband and red socks/Bed in lowest position, wheels locked, appropriate side rails in place/Call bell, personal items and telephone in reach/Instruct patient to call for assistance before getting out of bed or chair/Non-slip footwear when patient is out of bed/[place] to call system/Physically safe environment - no spills, clutter or unnecessary equipment/Purposeful Proactive Rounding/Room/bathroom lighting operational, light cord in reach

## 2025-06-09 NOTE — PACU DISCHARGE NOTE - AIRWAY PATENCY:
Dad called stating that [facility] is out of Nutropin 20 however see other encounter [facility] called today stated they have 5 and 10 for today. Can we send in the RX for Nutropin 10 ASAP per dads request. Please call dad if you need to reach him.  Phone # [phone].    Satisfactory

## 2025-06-10 ENCOUNTER — TRANSCRIPTION ENCOUNTER (OUTPATIENT)
Age: 75
End: 2025-06-10

## 2025-06-10 VITALS
OXYGEN SATURATION: 94 % | SYSTOLIC BLOOD PRESSURE: 130 MMHG | TEMPERATURE: 98 F | HEART RATE: 60 BPM | DIASTOLIC BLOOD PRESSURE: 44 MMHG | RESPIRATION RATE: 18 BRPM

## 2025-06-10 LAB
ANION GAP SERPL CALC-SCNC: 5 MMOL/L — SIGNIFICANT CHANGE UP (ref 5–17)
BASOPHILS # BLD AUTO: 0.04 K/UL — SIGNIFICANT CHANGE UP (ref 0–0.2)
BASOPHILS NFR BLD AUTO: 0.6 % — SIGNIFICANT CHANGE UP (ref 0–2)
BUN SERPL-MCNC: 22 MG/DL — SIGNIFICANT CHANGE UP (ref 7–23)
CALCIUM SERPL-MCNC: 8.3 MG/DL — LOW (ref 8.5–10.1)
CHLORIDE SERPL-SCNC: 109 MMOL/L — HIGH (ref 96–108)
CO2 SERPL-SCNC: 25 MMOL/L — SIGNIFICANT CHANGE UP (ref 22–31)
CREAT SERPL-MCNC: 0.72 MG/DL — SIGNIFICANT CHANGE UP (ref 0.5–1.3)
EGFR: 87 ML/MIN/1.73M2 — SIGNIFICANT CHANGE UP
EGFR: 87 ML/MIN/1.73M2 — SIGNIFICANT CHANGE UP
EOSINOPHIL # BLD AUTO: 0.23 K/UL — SIGNIFICANT CHANGE UP (ref 0–0.5)
EOSINOPHIL NFR BLD AUTO: 3.5 % — SIGNIFICANT CHANGE UP (ref 0–6)
GLUCOSE SERPL-MCNC: 91 MG/DL — SIGNIFICANT CHANGE UP (ref 70–99)
HCT VFR BLD CALC: 27.9 % — LOW (ref 34.5–45)
HGB BLD-MCNC: 8.5 G/DL — LOW (ref 11.5–15.5)
IMM GRANULOCYTES # BLD AUTO: 0.02 K/UL — SIGNIFICANT CHANGE UP (ref 0–0.07)
IMM GRANULOCYTES NFR BLD AUTO: 0.3 % — SIGNIFICANT CHANGE UP (ref 0–0.9)
LYMPHOCYTES # BLD AUTO: 1.14 K/UL — SIGNIFICANT CHANGE UP (ref 1–3.3)
LYMPHOCYTES NFR BLD AUTO: 17.4 % — SIGNIFICANT CHANGE UP (ref 13–44)
MCHC RBC-ENTMCNC: 27.8 PG — SIGNIFICANT CHANGE UP (ref 27–34)
MCHC RBC-ENTMCNC: 30.5 G/DL — LOW (ref 32–36)
MCV RBC AUTO: 91.2 FL — SIGNIFICANT CHANGE UP (ref 80–100)
MONOCYTES # BLD AUTO: 0.66 K/UL — SIGNIFICANT CHANGE UP (ref 0–0.9)
MONOCYTES NFR BLD AUTO: 10.1 % — SIGNIFICANT CHANGE UP (ref 2–14)
NEUTROPHILS # BLD AUTO: 4.47 K/UL — SIGNIFICANT CHANGE UP (ref 1.8–7.4)
NEUTROPHILS NFR BLD AUTO: 68.1 % — SIGNIFICANT CHANGE UP (ref 43–77)
NRBC # BLD AUTO: 0 K/UL — SIGNIFICANT CHANGE UP (ref 0–0)
NRBC # FLD: 0 K/UL — SIGNIFICANT CHANGE UP (ref 0–0)
NRBC BLD AUTO-RTO: 0 /100 WBCS — SIGNIFICANT CHANGE UP (ref 0–0)
PLATELET # BLD AUTO: 254 K/UL — SIGNIFICANT CHANGE UP (ref 150–400)
PMV BLD: 10.1 FL — SIGNIFICANT CHANGE UP (ref 7–13)
POTASSIUM SERPL-MCNC: 3.9 MMOL/L — SIGNIFICANT CHANGE UP (ref 3.5–5.3)
POTASSIUM SERPL-SCNC: 3.9 MMOL/L — SIGNIFICANT CHANGE UP (ref 3.5–5.3)
RBC # BLD: 3.06 M/UL — LOW (ref 3.8–5.2)
RBC # FLD: 14.1 % — SIGNIFICANT CHANGE UP (ref 10.3–14.5)
SODIUM SERPL-SCNC: 139 MMOL/L — SIGNIFICANT CHANGE UP (ref 135–145)
WBC # BLD: 6.56 K/UL — SIGNIFICANT CHANGE UP (ref 3.8–10.5)
WBC # FLD AUTO: 6.56 K/UL — SIGNIFICANT CHANGE UP (ref 3.8–10.5)

## 2025-06-10 PROCEDURE — 93010 ELECTROCARDIOGRAM REPORT: CPT

## 2025-06-10 RX ORDER — OXYCODONE HYDROCHLORIDE 30 MG/1
10 TABLET ORAL ONCE
Refills: 0 | Status: DISCONTINUED | OUTPATIENT
Start: 2025-06-10 | End: 2025-06-10

## 2025-06-10 RX ADMIN — OXYCODONE HYDROCHLORIDE 10 MILLIGRAM(S): 30 TABLET ORAL at 05:10

## 2025-06-10 RX ADMIN — AMIODARONE HYDROCHLORIDE 100 MILLIGRAM(S): 50 INJECTION, SOLUTION INTRAVENOUS at 11:11

## 2025-06-10 RX ADMIN — EZETIMIBE 10 MILLIGRAM(S): 10 TABLET ORAL at 11:10

## 2025-06-10 RX ADMIN — OXYCODONE HYDROCHLORIDE 10 MILLIGRAM(S): 30 TABLET ORAL at 05:45

## 2025-06-10 RX ADMIN — Medication 40 MILLIGRAM(S): at 11:10

## 2025-06-10 RX ADMIN — Medication 1000 MILLIGRAM(S): at 04:59

## 2025-06-10 RX ADMIN — GABAPENTIN 800 MILLIGRAM(S): 400 CAPSULE ORAL at 04:59

## 2025-06-10 RX ADMIN — Medication 25 MILLIGRAM(S): at 11:10

## 2025-06-10 NOTE — DISCHARGE NOTE NURSING/CASE MANAGEMENT/SOCIAL WORK - FINANCIAL ASSISTANCE
[facility] provides services at a reduced cost to those who are determined to be eligible through [facility]’s financial assistance program. Information regarding [facility]’s financial assistance program can be found by going to [email or web address] or by calling [phone].

## 2025-06-10 NOTE — DISCHARGE NOTE PROVIDER - CARE PROVIDER_API CALL
Newton Melendez.  Cardiac Electrophysiology  [address] [place] [address]  Phone: [phone]  Fax: [phone]  Follow Up Time:

## 2025-06-10 NOTE — DISCHARGE NOTE PROVIDER - HOSPITAL COURSE
74 year old female with bradycardia, PMH of aortic stenosis, S/P TAVR, PAD, atrial fibrillation, HTN, hyperlipidemia, DM type 2/off medication, chronic PVD/prior vascular ulcers and morbid obesity/prior lap band (since removed). She is scheduled for PPM placement.

## 2025-06-10 NOTE — DISCHARGE NOTE PROVIDER - NSDCFUSCHEDAPPT_GEN_ALL_CORE_FT
[facility]  ELECTROPH [address]  Scheduled Appointment: 06/23/2025    [facility]  VASCULAR [address]  Scheduled Appointment: 07/16/2025    Caryl Manjarrez  [facility]  VASCULAR [address]  Scheduled Appointment: 07/16/2025    Zay Stovall  [facility]  PODIATRY [address]  Scheduled Appointment: 08/14/2025

## 2025-06-10 NOTE — DISCHARGE NOTE NURSING/CASE MANAGEMENT/SOCIAL WORK - PATIENT PORTAL LINK FT
You can access the FollowMyHealth Patient Portal offered by [facility] by registering at the following website: [email or web address]. By joining [employer]’s FollowMyHealth portal, you will also be able to view your health information using other applications (apps) compatible with our system.

## 2025-06-10 NOTE — PROGRESS NOTE ADULT - SUBJECTIVE AND OBJECTIVE BOX
74 year old female with bradycardia, PMH of aortic stenosis, S/P TAVR, PAD, atrial fibrillation, HTN, hyperlipidemia, DM type 2/off medication, chronic PVD/prior vascular ulcers and morbid obesity/prior lap band (since removed). She is post-op day # 1.    EKG:  TELE:  SR with occasional At pacing and tracking and VS at 60 BPM    PAST MEDICAL & SURGICAL HISTORY:  Diabetes mellitus type II, controlled      Atrial fibrillation      Congestive heart failure      Dyspnea      Morbid obesity with BMI of 40.0-44.9, adult      Neuropathy      Peripheral venous insufficiency      Thyroid nodule      HTN (hypertension)      Cellulitis      At risk for sleep apnea      Bradycardia      Anemia      History of heart valve disorder      CAD (coronary artery disease)      History of esophagogastroduodenoscopy (EGD)      H/O colonoscopy      Other complications of gastric band procedure      S/P left knee arthroscopy      Status post medial meniscus repair      History of cholecystectomy      S/P cataract surgery      H/O cardiac radiofrequency ablation      S/P TAVR (transcatheter aortic valve replacement)      S/P femoropopliteal bypass surgery      History of percutaneous angioplasty          FAMILY HISTORY:  Family history of breast cancer in mother    Family history of diabetes mellitus in mother    FHx: heart disease (Sibling)        MEDICATIONS  (STANDING):  aMIOdarone    Tablet 100 milliGRAM(s) Oral daily  atorvastatin 10 milliGRAM(s) Oral at bedtime  ezetimibe 10 milliGRAM(s) Oral daily  gabapentin 800 milliGRAM(s) Oral three times a day  montelukast 10 milliGRAM(s) Oral at bedtime  pantoprazole    Tablet 40 milliGRAM(s) Oral two times a day  rivaroxaban 20 milliGRAM(s) Oral with dinner  spironolactone 25 milliGRAM(s) Oral daily    MEDICATIONS  (PRN):  zolpidem 5 milliGRAM(s) Oral at bedtime PRN Insomnia  zolpidem 5 milliGRAM(s) Oral at bedtime PRN Insomnia      Allergies    latex (Unknown)  PC Pen VK (Rash)  pregabalin (Unknown)  penicillin (Hives; Urticaria)    Intolerances        Vital Signs Last 24 Hrs  T(C): 36.7 (10 Isaias 2025 04:00), Max: 37.1 (09 Jun 2025 11:14)  T(F): 98 (10 Isaias 2025 04:00), Max: 98.7 (09 Jun 2025 11:14)  HR: 64 (10 Isaias 2025 04:00) (55 - 64)  BP: 131/49 (10 Isaias 2025 04:00) (131/49 - 183/67)  BP(mean): 67 (10 Isaias 2025 04:00) (67 - 77)  RR: 18 (10 Isaias 2025 04:00) (14 - 20)  SpO2: 94% (10 Isaias 2025 04:00) (94% - 98%)    Parameters below as of 10 Isaias 2025 04:00  Patient On (Oxygen Delivery Method): room air        PHYSICAL EXAMINATION:    GENERAL APPEARANCE:  Pt. is not currently dyspneic, in no distress. Pt. is alert, oriented, and pleasant.  HEENT:  Pupils are normal and react normally. No icterus. Mucous membranes well colored.  NECK:  Supple. No lymphadenopathy. Jugular venous pressure not elevated. Carotids equal.   HEART:   The cardiac impulse has a normal quality. There are no murmurs, rubs or gallops noted  CHEST:  Chest is clear to auscultation. Normal respiratory effort.  ABDOMEN:  Soft and nontender.   EXTREMITIES:  There is no edema.   SKIN:  No rash or significant lesions are noted.    REVIEW OF SYSTEMS:    CONSTITUTIONAL:  No weakness, fevers or chills  EYES/ENT:  No visual changes;  No vertigo or throat pain   NECK:  No pain or stiffness  RESPIRATORY:  No cough, wheezing, hemoptysis; No shortness of breath  CARDIOVASCULAR:  No chest pain or palpitations  GASTROINTESTINAL:  No abdominal or epigastric pain. No nausea, vomiting, or hematemesis; No diarrhea or constipation. No melena or hematochezia.  GENITOURINARY:  No dysuria, frequency or hematuria  MUSCULOSKELETAL:  FROM all extremities, normal strength, No calf tenderness  NEUROLOGICAL:  No numbness or weakness  SKIN:  No itching, rashes      LABS:                        8.5    6.56  )-----------( 254      ( 10 Isaias 2025 04:57 )             27.9     06-10    139  |  109[H]  |  22  ----------------------------<  91  3.9   |  25  |  0.72    Ca    8.3[L]      10 Isaias 2025 04:57        Urinalysis Basic - ( 10 Isaias 2025 04:57 )    Color: x / Appearance: x / SG: x / pH: x  Gluc: 91 mg/dL / Ketone: x  / Bili: x / Urobili: x   Blood: x / Protein: x / Nitrite: x   Leuk Esterase: x / RBC: x / WBC x   Sq Epi: x / Non Sq Epi: x / Bacteria: x            RADIOLOGY & ADDITIONAL TESTS:          INTERPRETATION:  Portable AP chest radiograph    COMPARISON: 5/27/2025 chest x-ray.    CLINICAL INFORMATION: Line placement.    FINDINGS:  CATHETERS AND TUBES: None    PULMONARY:  No airspace consolidations or radiographic evidence of pulmonary nodules..  No pleural effusion or pneumothorax.    HEART/VASCULAR: The heart size and mediastinum configuration are within   the limits of normal. Cardiac device wire leads are within right atrium   and right ventricle. Status post TAVR procedure.    BONES: The visualized osseous thorax is intact.    IMPRESSION: Cardiac device wire leads are within right atrium and right   ventricle.   No radiographic evidence of active chest disease..    --- End of Report ---                 74 year old female with bradycardia, PMH of aortic stenosis, S/P TAVR, PAD, atrial fibrillation, HTN, hyperlipidemia, DM type 2/off medication, chronic PVD/prior vascular ulcers and morbid obesity/prior lap band (since removed). She is post-op day # 1.    EKG: AT Pacing with VS at 62 BPM  TELE:  SR with occasional At pacing and tracking and VS at 60 BPM    PAST MEDICAL & SURGICAL HISTORY:  Diabetes mellitus type II, controlled      Atrial fibrillation      Congestive heart failure      Dyspnea      Morbid obesity with BMI of 40.0-44.9, adult      Neuropathy      Peripheral venous insufficiency      Thyroid nodule      HTN (hypertension)      Cellulitis      At risk for sleep apnea      Bradycardia      Anemia      History of heart valve disorder      CAD (coronary artery disease)      History of esophagogastroduodenoscopy (EGD)      H/O colonoscopy      Other complications of gastric band procedure      S/P left knee arthroscopy      Status post medial meniscus repair      History of cholecystectomy      S/P cataract surgery      H/O cardiac radiofrequency ablation      S/P TAVR (transcatheter aortic valve replacement)      S/P femoropopliteal bypass surgery      History of percutaneous angioplasty          FAMILY HISTORY:  Family history of breast cancer in mother    Family history of diabetes mellitus in mother    FHx: heart disease (Sibling)        MEDICATIONS  (STANDING):  aMIOdarone    Tablet 100 milliGRAM(s) Oral daily  atorvastatin 10 milliGRAM(s) Oral at bedtime  ezetimibe 10 milliGRAM(s) Oral daily  gabapentin 800 milliGRAM(s) Oral three times a day  montelukast 10 milliGRAM(s) Oral at bedtime  pantoprazole    Tablet 40 milliGRAM(s) Oral two times a day  rivaroxaban 20 milliGRAM(s) Oral with dinner  spironolactone 25 milliGRAM(s) Oral daily    MEDICATIONS  (PRN):  zolpidem 5 milliGRAM(s) Oral at bedtime PRN Insomnia  zolpidem 5 milliGRAM(s) Oral at bedtime PRN Insomnia      Allergies    latex (Unknown)  PC Pen VK (Rash)  pregabalin (Unknown)  penicillin (Hives; Urticaria)    Intolerances        Vital Signs Last 24 Hrs  T(C): 36.7 (10 Isaias 2025 04:00), Max: 37.1 (09 Jun 2025 11:14)  T(F): 98 (10 Isaias 2025 04:00), Max: 98.7 (09 Jun 2025 11:14)  HR: 64 (10 Isaias 2025 04:00) (55 - 64)  BP: 131/49 (10 Isaias 2025 04:00) (131/49 - 183/67)  BP(mean): 67 (10 Isaias 2025 04:00) (67 - 77)  RR: 18 (10 Isaias 2025 04:00) (14 - 20)  SpO2: 94% (10 Isaias 2025 04:00) (94% - 98%)    Parameters below as of 10 Isaias 2025 04:00  Patient On (Oxygen Delivery Method): room air        PHYSICAL EXAMINATION:    GENERAL APPEARANCE:  Pt. is not currently dyspneic, in no distress. Pt. is alert, oriented, and pleasant.  HEENT:  Pupils are normal and react normally. No icterus. Mucous membranes well colored.  NECK:  Supple. No lymphadenopathy. Jugular venous pressure not elevated. Carotids equal.   HEART:   The cardiac impulse has a normal quality. There are no murmurs, rubs or gallops noted, there is a small ecchymotic area under the dressong  CHEST:  Chest is clear to auscultation. Normal respiratory effort.  ABDOMEN:  Soft and nontender.   EXTREMITIES:  There is no edema.   SKIN:  No rash or significant lesions are noted.    REVIEW OF SYSTEMS:    CONSTITUTIONAL:  No weakness, fevers or chills  EYES/ENT:  No visual changes;  No vertigo or throat pain   NECK:  No pain or stiffness  RESPIRATORY:  No cough, wheezing, hemoptysis; No shortness of breath  CARDIOVASCULAR:  No chest pain or palpitations  GASTROINTESTINAL:  No abdominal or epigastric pain. No nausea, vomiting, or hematemesis; No diarrhea or constipation. No melena or hematochezia.  GENITOURINARY:  No dysuria, frequency or hematuria  MUSCULOSKELETAL:  FROM all extremities, normal strength, No calf tenderness  NEUROLOGICAL:  No numbness or weakness  SKIN:  No itching, rashes      LABS:                        8.5    6.56  )-----------( 254      ( 10 Isaias 2025 04:57 )             27.9     06-10    139  |  109[H]  |  22  ----------------------------<  91  3.9   |  25  |  0.72    Ca    8.3[L]      10 Isaias 2025 04:57        Urinalysis Basic - ( 10 Isaias 2025 04:57 )    Color: x / Appearance: x / SG: x / pH: x  Gluc: 91 mg/dL / Ketone: x  / Bili: x / Urobili: x   Blood: x / Protein: x / Nitrite: x   Leuk Esterase: x / RBC: x / WBC x   Sq Epi: x / Non Sq Epi: x / Bacteria: x            RADIOLOGY & ADDITIONAL TESTS:          INTERPRETATION:  Portable AP chest radiograph    COMPARISON: 5/27/2025 chest x-ray.    CLINICAL INFORMATION: Line placement.    FINDINGS:  CATHETERS AND TUBES: None    PULMONARY:  No airspace consolidations or radiographic evidence of pulmonary nodules..  No pleural effusion or pneumothorax.    HEART/VASCULAR: The heart size and mediastinum configuration are within   the limits of normal. Cardiac device wire leads are within right atrium   and right ventricle. Status post TAVR procedure.    BONES: The visualized osseous thorax is intact.    IMPRESSION: Cardiac device wire leads are within right atrium and right   ventricle.   No radiographic evidence of active chest disease..    --- End of Report ---

## 2025-06-10 NOTE — PROGRESS NOTE ADULT - ASSESSMENT
74 year old female with bradycardia, PMH of aortic stenosis, S/P TAVR, PAD, atrial fibrillation, HTN, hyperlipidemia, DM type 2/off medication, chronic PVD/prior vascular ulcers and morbid obesity/prior lap band (since removed). She is post -op day #1 for PPM dual chamber and is stable for discharge      Pt will f/u in EP Clinic  in 2 weeks or sooner with concerns or questions  Pt will carry PPM card and hook up the  remote transmitter  Pt will return to all pre-op medications  Pt will avoid lifting > than 10 lbs with left arm  Pt will not shower for 5 days or drive for 2 weeks  Copy of discharge instructions given  This patient's questions were answered and understands the importance of following discharge instructions     Interrogation reveals   Device Manufactured by [employer]  Mode:   Thresholds:  Atrial    Volts @   ms  Ventriclar  Volts  @   ms  Impedance:  Atrial      Ohms, Ventricular   Ohms   Sensing:  Atrial   mV  Ventricular mV  74 year old female with bradycardia, PMH of aortic stenosis, S/P TAVR, PAD, atrial fibrillation, HTN, hyperlipidemia, DM type 2/off medication, chronic PVD/prior vascular ulcers and morbid obesity/prior lap band (since removed). She is post -op day #1 for PPM dual chamber and is stable for discharge      Pt will f/u in EP Clinic  in 2 weeks or sooner with concerns or questions  Pt will carry PPM card and hook up the  remote transmitter  Pt will return to all pre-op medications  Pt will avoid lifting > than 10 lbs with left arm  Pt will not shower for 5 days or drive for 2 weeks  Copy of discharge instructions given  This patient's questions were answered and understands the importance of following discharge instructions     Interrogation reveals   Device Manufactured by [employer]  Mode: DDD  Thresholds:  Atrial  2  Volts @  /4 ms  Ventriclar  1Volts  @  .4  ms  Impedance:  Atrial     38- Ohms, Ventricular 550  Ohms   Sensing:  Atrial 1.7  mV  Ventricular  9/9mV

## 2025-06-12 DIAGNOSIS — I49.5 SICK SINUS SYNDROME: ICD-10-CM

## 2025-06-23 ENCOUNTER — NON-APPOINTMENT (OUTPATIENT)
Age: 75
End: 2025-06-23

## 2025-06-23 ENCOUNTER — APPOINTMENT (OUTPATIENT)
Dept: ELECTROPHYSIOLOGY | Facility: CLINIC | Age: 75
End: 2025-06-23
Payer: MEDICARE

## 2025-06-23 VITALS
DIASTOLIC BLOOD PRESSURE: 58 MMHG | HEART RATE: 65 BPM | WEIGHT: 233 LBS | OXYGEN SATURATION: 97 % | HEIGHT: 69 IN | SYSTOLIC BLOOD PRESSURE: 147 MMHG | BODY MASS INDEX: 34.51 KG/M2

## 2025-06-23 VITALS — SYSTOLIC BLOOD PRESSURE: 128 MMHG | HEART RATE: 64 BPM | DIASTOLIC BLOOD PRESSURE: 56 MMHG

## 2025-06-23 PROBLEM — Z95.0 ARTIFICIAL CARDIAC PACEMAKER: Status: ACTIVE | Noted: 2025-06-23

## 2025-06-23 PROCEDURE — 99024 POSTOP FOLLOW-UP VISIT: CPT

## 2025-06-25 NOTE — ED PROVIDER NOTE - HEME/LYMPH NEGATIVE STATEMENT, MLM
HEART CARE ENCOUNTER CONSULTATON NOTE      [facility]  [phone]      Assessment/Recommendations   Assessment:  1.  Coronary artery disease status post 5 vessel CABG with LIMA-LAD, RA-OM1, SVG-diagonal, SVG-OM 3, SVG-RPDA by Dr. Fuentes on 10/25/2024 recovering well without anginal complaints  2.  Dyslipidemia: Goal LDL of less than 70.  Lipids well-controlled.  Continue on high-dose statin therapy  3.  Diabetes mellitus type 2: Management per primary team  4.  Anxiety/depression  5.  Lightheadedness: May be related to high dose metoprolol.  Also bradycardic today.  Will adjust metoprolol dosing to Toprol-XL 50 mg daily from metoprolol tartrate 100 mg twice daily.  If continued symptoms/bradycardia we will proceed with a ZIO.  Start on low-dose lisinopril to ensure that blood pressure remains well-controlled.     Plan:  1.  Continue aspirin 81 mg daily  2.  Stop metoprolol tartrate and start on Toprol-XL 50 mg daily  3.  Start lisinopril 10 mg daily  4.  Continue on amlodipine 2.5 mg for a year post CAB  5.  Continue Crestor  Follow-up in 6 months         History of Present Illness/Subjective    HPI: Holly Beal is a 68 year old female with history of coronary artery disease status post 5 vessel CABG with LIMA-LAD, RA-OM1, SVG-diagonal, SVG-OM 3, SVG-RPDA by Dr. Fuentes on 10/25/2024, diabetes mellitus type 2, dyslipidemia, anxiety/depression who I am seeing today in follow-up.  On occasion she notes feeling a little faint if she bends over to do something.  She denies any shortness of breath.  Overall feeling great.  [place] [facility] [place] family about a month ago and did a lot of walking and felt great with the activity.  Occasional slight chest pain but nothing prolonged and it feels different from the chest pain she had prior to her bypass surgery.  She has developed keloid scar formation on her left wrist and is going to be seeing a dermatologist to evaluate for  "options.     The longitudinal plan of care for the diagnosis(es)/condition(s) as documented were addressed during this visit. Due to the added complexity in care, I will continue to support Holly in the subsequent management and with ongoing continuity of care.        Physical Examination  Review of Systems   Vitals: /56 (BP Location: Right arm, Patient Position: Sitting, Cuff Size: Adult Regular)   Pulse (!) 48   Resp 16   Ht 1.676 m (5' 6\")   Wt 69.4 kg (153 lb)   BMI 24.69 kg/m    BMI= Body mass index is 24.69 kg/m .  Wt Readings from Last 3 Encounters:   25 69.4 kg (153 lb)   25 69.4 kg (153 lb)   25 69.9 kg (154 lb)       General Appearance:   no distress, normal body habitus   ENT/Mouth: membranes moist, no oral lesions or bleeding gums.      EYES:  no scleral icterus, normal conjunctivae       Chest/Lungs:   lungs are clear to auscultation   Cardiovascular:   Regular. Normal first and second heart sounds with no murmur no edema bilaterally        Extremities: no cyanosis or clubbing   Skin: no xanthelasma, warm.    Neurologic: normal  bilateral, no tremors     Psychiatric: alert and oriented x3, calm        Please refer above for cardiac ROS details.        Medical History  Surgical History Family History Social History   Past Medical History:   Diagnosis Date    Asthma     Diabetes mellitus, type 2 (H)      Past Surgical History:   Procedure Laterality Date     SECTION      CORONARY ARTERY BYPASS GRAFT, WITH ENDOSCOPIC VESSEL PROCUREMENT N/A 10/25/2024    Procedure: CORONARY ARTERY BYPASS GRAFT TIMES FIVE, LEFT INTERNAL MAMMARY ARTERY HARVEST, LEFT AND RIGHT LEG ENDOSCOPIC VESSEL PROCUREMENT,;  Surgeon: Rachel Fuentes MD;  Location: [facility] Main OR    CV CORONARY ANGIOGRAM N/A 10/23/2024    Procedure: Coronary Angiogram;  Surgeon: Tyrone Pendleton MD;  Location: [facility] CATH LAB CV    CV LEFT HEART CATH N/A 10/23/2024    Procedure: Left Heart " Catheterization;  Surgeon: Tyrone Pendleton MD;  Location: [facility] CATH LAB CV    IR NEPHROLITHOTOMY  10/22/2015    OTHER SURGICAL HISTORY  2000    Esophagus line surgeryremoval of guide wire    PROCURE ARTERY RADIAL Left 10/25/2024    Procedure: LEFT RADIAL ARTERY HARVEST;  Surgeon: Rachel Fuentes MD;  Location: [facility] OR    TRANSESOPHAGEAL ECHOCARDIOGRAM INTRAOPERATIVE  10/25/2024    Procedure: EPIAORTIC ULTRASOUND, ANESTHESIA TRANSESOPHAGEAL ECHOCARDIOGRAM;  Surgeon: Rachel Fuentes MD;  Location: [facility] OR     No family history on file.     Social History     Socioeconomic History    Marital status:      Spouse name: Not on file    Number of children: Not on file    Years of education: Not on file    Highest education level: Not on file   Occupational History    Not on file   Tobacco Use    Smoking status: Never    Smokeless tobacco: Never   Substance and Sexual Activity    Alcohol use: Yes     Comment: Alcoholic Drinks/day: rare    Drug use: No    Sexual activity: Not on file   Other Topics Concern    Not on file   Social History Narrative    Not on file     Social Drivers of Health     Financial Resource Strain: Low Risk  (10/23/2024)    Financial Resource Strain     Within the past 12 months, have you or your family members you live with been unable to get utilities (heat, electricity) when it was really needed?: No   Food Insecurity: Low Risk  (10/23/2024)    Food Insecurity     Within the past 12 months, did you worry that your food would run out before you got money to buy more?: No     Within the past 12 months, did the food you bought just not last and you didn t have money to get more?: No   Transportation Needs: Low Risk  (10/23/2024)    Transportation Needs     Within the past 12 months, has lack of transportation kept you from medical appointments, getting your medicines, non-medical meetings or appointments, work, or from getting things that you need?: No   Physical  Activity: Not on file   Stress: Not on file   Social Connections: Not on file   Interpersonal Safety: High Risk (10/23/2024)    Interpersonal Safety     Do you feel physically and emotionally safe where you currently live?: No     Within the past 12 months, have you been hit, slapped, kicked or otherwise physically hurt by someone?: No     Within the past 12 months, have you been humiliated or emotionally abused in other ways by your partner or ex-partner?: No   Housing Stability: Low Risk  (10/23/2024)    Housing Stability     Do you have housing? : Yes     Are you worried about losing your housing?: No           Medications  Allergies   Current Outpatient Medications   Medication Sig Dispense Refill    Alpha Lipoic Ac-Biotin-Keratin (BIOTIN-KERATIN-ALPHA LIPOIC AC PO) Take 1 capsule by mouth daily.      ALPRAZolam (XANAX) 0.5 MG tablet [ALPRAZOLAM (XANAX) 0.5 MG TABLET] Take 0.5 mg by mouth 3 (three) times a day as needed for anxiety.      amLODIPine (NORVASC) 2.5 MG tablet Take 1 tablet (2.5 mg) by mouth daily. 30 tablet 5    aspirin (ASA) 81 MG chewable tablet Take 2 tablets (162 mg) by mouth daily. 60 tablet 3    AZO-CRANBERRY PO Take 1 tablet by mouth three times a week.      cephALEXin (KEFLEX) 500 MG capsule Take 500 mg by mouth daily as needed (as needed for kidney stones).      cyanocobalamin 1000 MCG tablet [CYANOCOBALAMIN 1000 MCG TABLET] Take 1,000 mcg by mouth daily.      fluticasone (FLONASE) 50 mcg/actuation nasal spray [FLUTICASONE (FLONASE) 50 MCG/ACTUATION NASAL SPRAY] 2 sprays into each nostril daily as needed for rhinitis.      lisinopril (ZESTRIL) 10 MG tablet Take 1 tablet (10 mg) by mouth daily. 90 tablet 3    metFORMIN (GLUCOPHAGE XR) 500 MG 24 hr tablet Take 500 mg by mouth daily (with dinner). (Patient taking differently: Take 500 mg by mouth 2 times daily (with meals).)      metoprolol succinate ER (TOPROL XL) 50 MG 24 hr tablet Take 1 tablet (50 mg) by mouth daily. 90 tablet 3     "omeprazole (PRILOSEC) 20 MG capsule [OMEPRAZOLE (PRILOSEC) 20 MG CAPSULE] Take 20 mg by mouth every evening.       Probiotic Product (PROBIOTIC 10 ULTRA STRENGTH PO) Take 1 capsule by mouth three times a week. Alternates  with AZO Cranberry.      rosuvastatin (CRESTOR) 40 MG tablet TAKE 1 TABLET BY MOUTH EVERY DAY 90 tablet 2    triamcinolone (ARISTOCORT HP) 0.5 % external cream Apply 1 Application topically as needed for irritation.      acetaminophen (TYLENOL) 500 MG tablet Take 1-2 tablets (500-1,000 mg) by mouth every 6 hours as needed for mild pain.      albuterol (PROVENTIL HFA;VENTOLIN HFA) 90 mcg/actuation inhaler [ALBUTEROL (PROVENTIL HFA;VENTOLIN HFA) 90 MCG/ACTUATION INHALER] Inhale 2 puffs every 6 (six) hours as needed for wheezing or shortness of breath. (Patient not taking: Reported on 6/23/2025)         Allergies   Allergen Reactions    Atorvastatin Nausea    Simvastatin Nausea          Lab Results    Chemistry/lipid CBC Cardiac Enzymes/BNP/TSH/INR   Recent Labs   Lab Test 12/16/24  0806   CHOL 97   HDL 40*   LDL 28   TRIG 146     Recent Labs   Lab Test 12/16/24  0806 05/06/24  1111 07/05/23  1235   LDL 28 109* 129*     Recent Labs   Lab Test 10/31/24  0745 10/31/24  0430   NA  --  140   POTASSIUM  --  4.5   CHLORIDE  --  97*   CO2  --  30*   * 232*   BUN  --  18.1   CR  --  0.82   GFRESTIMATED  --  77   SHAQUILLE  --  10.0     Recent Labs   Lab Test 10/31/24  0430 10/30/24  0432 10/29/24  0420   CR 0.82 0.90 0.76     Recent Labs   Lab Test 10/23/24  1635   A1C 7.1*          Recent Labs   Lab Test 10/29/24  0420 10/28/24  0457   WBC 11.0 14.5*   HGB  --  9.4*   HCT  --  28.7*   MCV  --  90    186     Recent Labs   Lab Test 10/28/24  0457 10/27/24  0428 10/26/24  0411   HGB 9.4* 9.3* 8.9*    No results for input(s): \"TROPONINI\" in the last 87919 hours.  No results for input(s): \"BNP\", \"NTBNPI\", \"NTBNP\" in the last 73328 hours.  Recent Labs   Lab Test 11/07/23  1644   TSH 1.97     Recent Labs "   Lab Test 10/25/24  1327 10/25/24  1130 10/24/24  0950   INR 1.31* 1.43* 1.06        Olga Godwin MD                                       no anemia, no easy bruising, no jaundice, no swollen lymph nodes.

## 2025-07-08 ENCOUNTER — APPOINTMENT (OUTPATIENT)
Dept: UROLOGY | Facility: CLINIC | Age: 75
End: 2025-07-08
Payer: MEDICARE

## 2025-07-08 VITALS
WEIGHT: 233 LBS | HEIGHT: 69 IN | RESPIRATION RATE: 16 BRPM | DIASTOLIC BLOOD PRESSURE: 66 MMHG | HEART RATE: 68 BPM | SYSTOLIC BLOOD PRESSURE: 134 MMHG | OXYGEN SATURATION: 97 % | BODY MASS INDEX: 34.51 KG/M2

## 2025-07-08 PROBLEM — N39.46 URINARY INCONTINENCE, MIXED: Status: ACTIVE | Noted: 2025-07-08

## 2025-07-08 PROCEDURE — 51798 US URINE CAPACITY MEASURE: CPT

## 2025-07-08 PROCEDURE — G2211 COMPLEX E/M VISIT ADD ON: CPT

## 2025-07-08 PROCEDURE — 99203 OFFICE O/P NEW LOW 30 MIN: CPT

## 2025-07-08 RX ORDER — MIRABEGRON 50 MG/1
50 TABLET, EXTENDED RELEASE ORAL
Qty: 90 | Refills: 3 | Status: ACTIVE | COMMUNITY
Start: 2025-07-08 | End: 1900-01-01

## 2025-07-09 LAB
APPEARANCE: CLEAR
BACTERIA: NEGATIVE /HPF
BILIRUBIN URINE: NEGATIVE
BLOOD URINE: NEGATIVE
CAST: 0 /LPF
COLOR: YELLOW
EPITHELIAL CELLS: 5 /HPF
GLUCOSE QUALITATIVE U: NEGATIVE MG/DL
KETONES URINE: NEGATIVE MG/DL
LEUKOCYTE ESTERASE URINE: ABNORMAL
MICROSCOPIC-UA: NORMAL
NITRITE URINE: NEGATIVE
PH URINE: 6
PROTEIN URINE: NEGATIVE MG/DL
RED BLOOD CELLS URINE: 3 /HPF
SPECIFIC GRAVITY URINE: 1.02
UROBILINOGEN URINE: 0.2 MG/DL
WHITE BLOOD CELLS URINE: 35 /HPF

## 2025-07-10 LAB — BACTERIA UR CULT: NORMAL

## 2025-07-16 ENCOUNTER — APPOINTMENT (OUTPATIENT)
Dept: VASCULAR SURGERY | Facility: CLINIC | Age: 75
End: 2025-07-16
Payer: MEDICARE

## 2025-07-16 PROCEDURE — 99214 OFFICE O/P EST MOD 30 MIN: CPT

## 2025-07-16 PROCEDURE — 93923 UPR/LXTR ART STDY 3+ LVLS: CPT

## 2025-08-14 ENCOUNTER — APPOINTMENT (OUTPATIENT)
Age: 75
End: 2025-08-14
Payer: MEDICARE

## 2025-08-14 DIAGNOSIS — B35.1 TINEA UNGUIUM: ICD-10-CM

## 2025-08-14 DIAGNOSIS — G62.9 POLYNEUROPATHY, UNSPECIFIED: ICD-10-CM

## 2025-08-14 DIAGNOSIS — E11.9 TYPE 2 DIABETES MELLITUS W/OUT COMPLICATIONS: ICD-10-CM

## 2025-08-14 PROCEDURE — 11721 DEBRIDE NAIL 6 OR MORE: CPT | Mod: Q8

## 2025-08-14 PROCEDURE — 99213 OFFICE O/P EST LOW 20 MIN: CPT | Mod: 25

## 2025-08-28 ENCOUNTER — APPOINTMENT (OUTPATIENT)
Dept: UROLOGY | Facility: CLINIC | Age: 75
End: 2025-08-28
Payer: MEDICARE

## 2025-08-28 DIAGNOSIS — N39.46 MIXED INCONTINENCE: ICD-10-CM

## 2025-08-28 DIAGNOSIS — N32.81 OVERACTIVE BLADDER: ICD-10-CM

## 2025-08-28 PROCEDURE — 99214 OFFICE O/P EST MOD 30 MIN: CPT

## 2025-08-28 RX ORDER — TROSPIUM CHLORIDE 60 MG/1
60 CAPSULE, EXTENDED RELEASE ORAL
Qty: 90 | Refills: 3 | Status: ACTIVE | COMMUNITY
Start: 2025-08-28 | End: 1900-01-01